# Patient Record
Sex: FEMALE | Race: OTHER | HISPANIC OR LATINO | Employment: STUDENT | URBAN - METROPOLITAN AREA
[De-identification: names, ages, dates, MRNs, and addresses within clinical notes are randomized per-mention and may not be internally consistent; named-entity substitution may affect disease eponyms.]

---

## 2017-09-13 ENCOUNTER — GENERIC CONVERSION - ENCOUNTER (OUTPATIENT)
Dept: OTHER | Facility: OTHER | Age: 22
End: 2017-09-13

## 2017-09-13 DIAGNOSIS — M25.561 PAIN IN RIGHT KNEE: ICD-10-CM

## 2017-09-13 DIAGNOSIS — M76.51 PATELLAR TENDINITIS OF RIGHT KNEE: ICD-10-CM

## 2017-09-18 ENCOUNTER — GENERIC CONVERSION - ENCOUNTER (OUTPATIENT)
Dept: OTHER | Facility: OTHER | Age: 22
End: 2017-09-18

## 2017-09-18 ENCOUNTER — APPOINTMENT (OUTPATIENT)
Dept: PHYSICAL THERAPY | Age: 22
End: 2017-09-18
Payer: COMMERCIAL

## 2017-09-18 DIAGNOSIS — M25.561 PAIN IN RIGHT KNEE: ICD-10-CM

## 2017-09-18 DIAGNOSIS — M76.51 PATELLAR TENDINITIS OF RIGHT KNEE: ICD-10-CM

## 2017-09-18 PROCEDURE — G8978 MOBILITY CURRENT STATUS: HCPCS

## 2017-09-18 PROCEDURE — 97162 PT EVAL MOD COMPLEX 30 MIN: CPT

## 2017-09-18 PROCEDURE — 97035 APP MDLTY 1+ULTRASOUND EA 15: CPT

## 2017-09-18 PROCEDURE — G8979 MOBILITY GOAL STATUS: HCPCS

## 2017-09-20 ENCOUNTER — GENERIC CONVERSION - ENCOUNTER (OUTPATIENT)
Dept: OTHER | Facility: OTHER | Age: 22
End: 2017-09-20

## 2017-09-28 ENCOUNTER — APPOINTMENT (OUTPATIENT)
Dept: PHYSICAL THERAPY | Age: 22
End: 2017-09-28
Payer: COMMERCIAL

## 2017-09-28 PROCEDURE — 97035 APP MDLTY 1+ULTRASOUND EA 15: CPT

## 2017-09-28 PROCEDURE — 97110 THERAPEUTIC EXERCISES: CPT

## 2017-10-05 ENCOUNTER — APPOINTMENT (OUTPATIENT)
Dept: PHYSICAL THERAPY | Age: 22
End: 2017-10-05
Payer: COMMERCIAL

## 2017-10-05 PROCEDURE — 97110 THERAPEUTIC EXERCISES: CPT

## 2017-10-10 ENCOUNTER — APPOINTMENT (OUTPATIENT)
Dept: PHYSICAL THERAPY | Age: 22
End: 2017-10-10
Payer: COMMERCIAL

## 2017-10-17 ENCOUNTER — GENERIC CONVERSION - ENCOUNTER (OUTPATIENT)
Dept: OTHER | Facility: OTHER | Age: 22
End: 2017-10-17

## 2017-10-17 ENCOUNTER — APPOINTMENT (OUTPATIENT)
Dept: PHYSICAL THERAPY | Age: 22
End: 2017-10-17
Payer: COMMERCIAL

## 2017-10-17 PROCEDURE — 97110 THERAPEUTIC EXERCISES: CPT

## 2017-10-17 PROCEDURE — G8979 MOBILITY GOAL STATUS: HCPCS

## 2017-10-17 PROCEDURE — G8980 MOBILITY D/C STATUS: HCPCS

## 2017-10-24 ENCOUNTER — APPOINTMENT (OUTPATIENT)
Dept: PHYSICAL THERAPY | Age: 22
End: 2017-10-24
Payer: COMMERCIAL

## 2017-12-13 ENCOUNTER — LAB REQUISITION (OUTPATIENT)
Dept: LAB | Facility: HOSPITAL | Age: 22
End: 2017-12-13
Payer: COMMERCIAL

## 2017-12-13 ENCOUNTER — ALLSCRIPTS OFFICE VISIT (OUTPATIENT)
Dept: OTHER | Facility: OTHER | Age: 22
End: 2017-12-13

## 2017-12-13 DIAGNOSIS — N76.0 ACUTE VAGINITIS: ICD-10-CM

## 2017-12-13 LAB
BACTERIA UR QL AUTO: NEGATIVE
CLUE CELL (HISTORICAL): NEGATIVE
HYPHAL YEAST (HISTORICAL): NEGATIVE
KOH PREP (HISTORICAL): NEGATIVE
PH UR STRIP.AUTO: 4.5 [PH]
TRICHOMONAS (HISTORICAL): NEGATIVE
YEAST (HISTORICAL): NEGATIVE

## 2017-12-13 PROCEDURE — 87510 GARDNER VAG DNA DIR PROBE: CPT | Performed by: PHYSICIAN ASSISTANT

## 2017-12-13 PROCEDURE — 87660 TRICHOMONAS VAGIN DIR PROBE: CPT | Performed by: PHYSICIAN ASSISTANT

## 2017-12-13 PROCEDURE — 87591 N.GONORRHOEAE DNA AMP PROB: CPT | Performed by: PHYSICIAN ASSISTANT

## 2017-12-13 PROCEDURE — 87480 CANDIDA DNA DIR PROBE: CPT | Performed by: PHYSICIAN ASSISTANT

## 2017-12-13 PROCEDURE — 87491 CHLMYD TRACH DNA AMP PROBE: CPT | Performed by: PHYSICIAN ASSISTANT

## 2017-12-15 LAB
CHLAMYDIA DNA CVX QL NAA+PROBE: NORMAL
N GONORRHOEA DNA GENITAL QL NAA+PROBE: NORMAL

## 2017-12-15 NOTE — PROGRESS NOTES
Assessment  1  Oral contraceptive prescribed (V25 01) (Z30 011)   2  Vaginal discharge (623 5) (N89 8)    Plan  Oral contraceptive prescribed    · Altavera 0 15-30 MG-MCG Oral Tablet; TAKE 1 TABLET DAILY   Rx By: Katarzyna Miranda; Dispense: 84 Days ; #:84 Tablet; Refill: 0;For: Oral contraceptive prescribed; RE = N; Sent To: [id number]  Oral contraceptive prescribed, Vaginal discharge    · 97 Marina Galeano; Status:Resulted - Requires Verification;   Done: [id number] 01:53PM   Performed: In Office; EWJ:[id number];UQQIICG; Today;For:Oral contraceptive prescribed, Vaginal discharge; Ordered By:Lila Trujillo;    Discussion/Summary  Discussion Summary:   Wet mount negative, affirm and GC/Chlam sent out, will call if abnomal3 months altavera OCP sent via [facility] 3 months for annual       Chief Complaint  Chief Complaint Free Text Note Form: Acute visitPt c/o having vaginal discharge yellow/green in color along with a foul odor, denies any itching and no burning  States she was recently taking Altavera birth control Rx by [facility] but ran out and would like to discuss restarting birth control again  History of Present Illness  HPI: 24 y/o female who presents to the office as a new patient c/o yellow vaginal d/c x 1-2 months  Pt describes discharge as neon yellow and thin  Pt also has noticed an acidic odor with the discharge  Pt denies any itching, burning, abd pain, fever, chills, dysuria, or recent antibiotics  Pt has not used any OTC products and denies any aggravating or alleviating factors  Pt also needs refills of OCP, [facility]  Pt on altavera  Review of Systems  Focused-Female:  Constitutional: no fever-- and-- no chills  Gastrointestinal: no abdominal pain  Genitourinary: vaginal discharge, but-- no dysuria,-- no pelvic pain-- and-- no unexplained vaginal bleeding  Active Problems  1  Abdominal pain (789 00) (R10 9)   2   Acute gastritis (535 00) (K29 00)   3  Complex Regional Pain Syndrome Type I Of The Wrist (337 21)   4  Hip pain, right (719 45) (M25 551)   5  Hip pain, right (719 45) (M25 551)   6  Malaise and fatigue (780 79) (R53 81,R53 83)   7  Malaise and fatigue (780 79) (R53 81,R53 83)   8  Nausea (787 02) (R11 0)   9  Pain in joint of left wrist (719 43) (M25 532)   10  Patellar tendinitis of right knee (726 64) (M76 51)   11  Post-concussion syndrome (310 2) (F07 81)   12  Right knee pain (719 46) (M25 561)   13  Vitamin D deficiency (268 9) (E55 9)    Past Medical History  1  History of Anxiety (300 00) (F41 9)   2  History of Breast tenderness in female (611 71) (N64 4)   3  History of Ganglion Of The Left Wrist (727 43)   4  H/O screening mammography (V15 89) (Z92 89)   5  History of abdominal pain (V13 89) (Z87 898)   6  History of back injury (V15 59) (Z87 828)   7  History of chest pain (V13 89) (Z87 898)   8  History of concussion (V15 52) (Z87 820)   9  History of fracture of fibula (V15 51) (Z87 81)   10  History of headache (V13 89) (Z87 898)   11  History of low back pain (V13 59) (Z87 39)   12  History of shortness of breath (V13 89) (Z87 898)   13  History of sprain of wrist (V13 59) (Z87 828)   14  History of viral infection (V12 09) (Z86 19)   15  History of vomiting (V13 89) (Z87 898)   16  History of Injury of toe on right foot (959 7) (K95 475B)   17  History of Menarche (V21 8)   18  History of Pes planus (734) (M21 40)  Active Problems And Past Medical History Reviewed: The active problems and past medical history were reviewed and updated today  Surgical History  1  History of Oral Surgery Tooth Extraction   2  History of Wrist Excision Of Ganglion  Surgical History Reviewed: The surgical history was reviewed and updated today  Family History  Mother    1  Family history of hypertension (V17 49) (Z82 49)   2  Family history of migraine headaches (V17 2) (Z82 0)   3   Family history of type 2 diabetes mellitus (V18 0) (Z83 3)   4  Family history of varicose veins (V17 49) (Z82 49)   5  Family history of Headache  Father    6  Family history of Cholelithiasis   7  Family history of hypertension (V17 49) (Z82 49)   8  Family history of sarcoidosis (V19 8) (Z83 2)  Sister    5  Family history of thyroid disease (V18 19) (Z83 49)  Family History    10  Family history of Cancer   11  Family history of Diabetes Mellitus (V18 0)   12  Family history of Hypertension (V17 49)  Family History Reviewed: The family history was reviewed and updated today  Social History   · Activities: Gymnastics   · Denied: History of Alcohol use   · Brushes teeth twice a day   · Dental care, regularly   · Denied: History of Drug use   · Lives with parents   · Never A Smoker   · Not currently sexually active   · Sexually active   · Sleeps 8 - 10 hours a day   · Tobacco smoke exposure (V15 89) (Z77 22)  Social History Reviewed: The social history was reviewed and updated today  Current Meds   1  Daily Multivitamin TABS; Therapy: (Recorded:22Jun2015) to Recorded   2  Pennsaid 2 % Transdermal Solution; Please apply 2 pumps to the affected area with phonophoresis; Therapy: [id number] to (Last Rx:[id number])  Requested for: 59Itk1387 Ordered   3  Probiotic Product TABS; Therapy: (Recorded:22Jun2015) to Recorded  Medication List Reviewed: The medication list was reviewed and updated today  Allergies  1  Iodides   2  Iodinated Contrast Media  3  No Known Environmental Allergies   4  No Known Food Allergies    Vitals  Vital Signs    Recorded: 25ILY4429 42:89ZJ   Systolic 722, LUE, Sitting   Diastolic 68, LUE, Sitting   Height 5 ft 5 5 in   Weight 116 lb 8 oz   BMI Calculated 19 09   BSA Calculated 1 58   LMP [id number]     Physical Exam   Constitutional  General appearance: No acute distress, well appearing and well nourished  Genitourinary  External genitalia: Normal and no lesions appreciated     Vagina: Abnormal  Vagina: moderate,-- not foul smelling,-- white-- and-- frothy vaginal discharge, but-- no bleeding  Urethra: Normal    Urethral meatus: Normal    Bladder: Normal, soft, non-tender and no prolapse or masses appreciated  Cervix: Normal, no palpable masses  Uterus: Normal, non-tender, not enlarged, and no palpable masses  Adnexa/parametria: Normal, non-tender and no fullness or masses appreciated  Abdomen  Abdomen: Normal, non-tender, and no organomegaly noted     Psychiatric  Orientation to person, place, and time: Normal    Mood and affect: Normal        Signatures   Electronically signed by : MICHELLE Matson; Dec 13 2017  1:58PM EST                       (Author)    Electronically signed by : DEVONTE Monet ; Dec 13 2017  2:33PM EST

## 2017-12-16 LAB
CANDIDA RRNA VAG QL PROBE: NEGATIVE
G VAGINALIS RRNA GENITAL QL PROBE: POSITIVE
T VAGINALIS RRNA GENITAL QL PROBE: NEGATIVE

## 2018-01-22 VITALS
DIASTOLIC BLOOD PRESSURE: 67 MMHG | SYSTOLIC BLOOD PRESSURE: 110 MMHG | WEIGHT: 109 LBS | BODY MASS INDEX: 20.06 KG/M2 | HEIGHT: 62 IN

## 2018-01-23 VITALS
BODY MASS INDEX: 20.64 KG/M2 | DIASTOLIC BLOOD PRESSURE: 68 MMHG | HEIGHT: 63 IN | SYSTOLIC BLOOD PRESSURE: 104 MMHG | WEIGHT: 116.5 LBS

## 2018-03-06 ENCOUNTER — HOSPITAL ENCOUNTER (EMERGENCY)
Facility: HOSPITAL | Age: 23
Discharge: HOME | End: 2018-03-06
Attending: EMERGENCY MEDICINE
Payer: COMMERCIAL

## 2018-03-06 VITALS
HEART RATE: 75 BPM | SYSTOLIC BLOOD PRESSURE: 95 MMHG | HEIGHT: 62 IN | BODY MASS INDEX: 20.8 KG/M2 | TEMPERATURE: 98.6 F | RESPIRATION RATE: 18 BRPM | WEIGHT: 113 LBS | DIASTOLIC BLOOD PRESSURE: 58 MMHG | OXYGEN SATURATION: 98 %

## 2018-03-06 DIAGNOSIS — R11.2 NAUSEA AND VOMITING: ICD-10-CM

## 2018-03-06 DIAGNOSIS — A08.4 VIRAL GASTROENTERITIS: Primary | ICD-10-CM

## 2018-03-06 DIAGNOSIS — R19.7 DIARRHEA OF PRESUMED INFECTIOUS ORIGIN: ICD-10-CM

## 2018-03-06 LAB
ALBUMIN SERPL BCP-MCNC: 3.9 G/DL (ref 3.5–5)
ALP SERPL-CCNC: 56 U/L (ref 46–116)
ALT SERPL W P-5'-P-CCNC: 17 U/L (ref 12–78)
ANION GAP SERPL CALCULATED.3IONS-SCNC: 10 MMOL/L (ref 4–13)
AST SERPL W P-5'-P-CCNC: 48 U/L (ref 5–45)
BASOPHILS # BLD MANUAL: 0.1 THOUSAND/UL (ref 0–0.1)
BASOPHILS NFR MAR MANUAL: 1 % (ref 0–1)
BILIRUB SERPL-MCNC: 2.32 MG/DL (ref 0.2–1)
BUN SERPL-MCNC: 14 MG/DL (ref 5–25)
CALCIUM SERPL-MCNC: 8.9 MG/DL (ref 8.3–10.1)
CHLORIDE SERPL-SCNC: 106 MMOL/L (ref 100–108)
CO2 SERPL-SCNC: 20 MMOL/L (ref 21–32)
CREAT SERPL-MCNC: 0.82 MG/DL (ref 0.6–1.3)
EOSINOPHIL # BLD MANUAL: 0 THOUSAND/UL (ref 0–0.4)
EOSINOPHIL NFR BLD MANUAL: 0 % (ref 0–6)
ERYTHROCYTE [DISTWIDTH] IN BLOOD BY AUTOMATED COUNT: 12.6 % (ref 11.6–15.1)
GFR SERPL CREATININE-BSD FRML MDRD: 103 ML/MIN/1.73SQ M
GLUCOSE SERPL-MCNC: 126 MG/DL (ref 65–140)
HCT VFR BLD AUTO: 42.2 % (ref 34.8–46.1)
HGB BLD-MCNC: 14.5 G/DL (ref 11.5–15.4)
LIPASE SERPL-CCNC: 49 U/L (ref 73–393)
LYMPHOCYTES # BLD AUTO: 0.61 THOUSAND/UL (ref 0.6–4.47)
LYMPHOCYTES # BLD AUTO: 6 % (ref 14–44)
MCH RBC QN AUTO: 29.5 PG (ref 26.8–34.3)
MCHC RBC AUTO-ENTMCNC: 34.4 G/DL (ref 31.4–37.4)
MCV RBC AUTO: 86 FL (ref 82–98)
METAMYELOCYTES NFR BLD MANUAL: 1 % (ref 0–1)
MONOCYTES # BLD AUTO: 0.2 THOUSAND/UL (ref 0–1.22)
MONOCYTES NFR BLD: 2 % (ref 4–12)
NEUTROPHILS # BLD MANUAL: 9.2 THOUSAND/UL (ref 1.85–7.62)
NEUTS BAND NFR BLD MANUAL: 6 % (ref 0–8)
NEUTS SEG NFR BLD AUTO: 84 % (ref 43–75)
NRBC BLD AUTO-RTO: 0 /100 WBCS
PLATELET # BLD AUTO: 185 THOUSANDS/UL (ref 149–390)
PLATELET BLD QL SMEAR: ADEQUATE
PMV BLD AUTO: 11.9 FL (ref 8.9–12.7)
POTASSIUM SERPL-SCNC: 4.6 MMOL/L (ref 3.5–5.3)
PROT SERPL-MCNC: 8.3 G/DL (ref 6.4–8.2)
RBC # BLD AUTO: 4.92 MILLION/UL (ref 3.81–5.12)
RBC MORPH BLD: NORMAL
SODIUM SERPL-SCNC: 136 MMOL/L (ref 136–145)
WBC # BLD AUTO: 10.22 THOUSAND/UL (ref 4.31–10.16)

## 2018-03-06 PROCEDURE — 83690 ASSAY OF LIPASE: CPT | Performed by: EMERGENCY MEDICINE

## 2018-03-06 PROCEDURE — 96374 THER/PROPH/DIAG INJ IV PUSH: CPT

## 2018-03-06 PROCEDURE — 36415 COLL VENOUS BLD VENIPUNCTURE: CPT | Performed by: EMERGENCY MEDICINE

## 2018-03-06 PROCEDURE — 80053 COMPREHEN METABOLIC PANEL: CPT | Performed by: EMERGENCY MEDICINE

## 2018-03-06 PROCEDURE — 99284 EMERGENCY DEPT VISIT MOD MDM: CPT

## 2018-03-06 PROCEDURE — 96375 TX/PRO/DX INJ NEW DRUG ADDON: CPT

## 2018-03-06 PROCEDURE — 96361 HYDRATE IV INFUSION ADD-ON: CPT

## 2018-03-06 PROCEDURE — 85007 BL SMEAR W/DIFF WBC COUNT: CPT | Performed by: EMERGENCY MEDICINE

## 2018-03-06 PROCEDURE — 85027 COMPLETE CBC AUTOMATED: CPT | Performed by: EMERGENCY MEDICINE

## 2018-03-06 RX ORDER — DICYCLOMINE HCL 20 MG
20 TABLET ORAL 2 TIMES DAILY
Qty: 20 TABLET | Refills: 0 | Status: SHIPPED | OUTPATIENT
Start: 2018-03-06 | End: 2018-03-28

## 2018-03-06 RX ORDER — DICYCLOMINE HCL 20 MG
20 TABLET ORAL ONCE
Status: COMPLETED | OUTPATIENT
Start: 2018-03-06 | End: 2018-03-06

## 2018-03-06 RX ORDER — ONDANSETRON 2 MG/ML
4 INJECTION INTRAMUSCULAR; INTRAVENOUS ONCE
Status: COMPLETED | OUTPATIENT
Start: 2018-03-06 | End: 2018-03-06

## 2018-03-06 RX ORDER — BUPROPION HYDROCHLORIDE 75 MG/1
75 TABLET ORAL DAILY
COMMUNITY
End: 2018-09-14

## 2018-03-06 RX ORDER — KETOROLAC TROMETHAMINE 30 MG/ML
15 INJECTION, SOLUTION INTRAMUSCULAR; INTRAVENOUS ONCE
Status: COMPLETED | OUTPATIENT
Start: 2018-03-06 | End: 2018-03-06

## 2018-03-06 RX ORDER — LEVONORGESTREL AND ETHINYL ESTRADIOL 0.15-0.03
1 KIT ORAL DAILY
COMMUNITY
Start: 2017-12-13 | End: 2018-03-28 | Stop reason: SDUPTHER

## 2018-03-06 RX ORDER — ONDANSETRON 4 MG/1
4 TABLET, FILM COATED ORAL EVERY 8 HOURS PRN
Qty: 24 TABLET | Refills: 0 | Status: SHIPPED | OUTPATIENT
Start: 2018-03-06 | End: 2018-03-28

## 2018-03-06 RX ADMIN — ONDANSETRON 4 MG: 2 INJECTION INTRAMUSCULAR; INTRAVENOUS at 12:33

## 2018-03-06 RX ADMIN — SODIUM CHLORIDE 1000 ML: 0.9 INJECTION, SOLUTION INTRAVENOUS at 12:32

## 2018-03-06 RX ADMIN — SODIUM CHLORIDE 1000 ML: 0.9 INJECTION, SOLUTION INTRAVENOUS at 14:28

## 2018-03-06 RX ADMIN — KETOROLAC TROMETHAMINE 15 MG: 30 INJECTION, SOLUTION INTRAMUSCULAR at 14:05

## 2018-03-06 RX ADMIN — DICYCLOMINE HYDROCHLORIDE 20 MG: 20 TABLET ORAL at 12:55

## 2018-03-06 NOTE — ED PROVIDER NOTES
History  Chief Complaint   Patient presents with    Abdominal Pain     Vomiting at least 1x and hour since 1900 and it has increased since 0300  C/O BEING DIZZY AND WEAK  aLSO HAVE MULTIPLE BOUTS OF DIARRHEA     26-year-old female with no significant past medical history who is presenting with a 1 day history of nausea, vomiting, and diarrhea  Patient states that she began to vomit at approximately 1700 on the evening prior to presentation  Patient initially vomited 3 times per hour initially but has since vomited once per hour  Her last episode of vomiting was at 1015 on the morning of presentation  Vomit is nonbilious, nonbloody  Patient also reports frequent diarrhea, with watery bowel movements approximately every hour  No hematochezia or melena  Patient reports associated weakness, lightheadedness, and dizziness  She also reports diffuse cramping abdominal pain  Patient reports that her menses are irregular; last menstrual period was at the end of the history  Patient is sexually active but regularly uses contraception and is not concerned for pregnancy at this time  Patient does not have a history of abdominal surgery  Patient reports that she recently went on a cruise and returned on February 12  No fevers at home  Review of systems is otherwise negative  Plan:  Likely viral gastroenteritis  We will administer IV fluids, IV antiemetics, and Bentyl for symptom relief  We will obtain CBC, CMP, and lipase  We will reassess patient frequently  CT of the abdomen and pelvis if symptoms do not improve  Prior to Admission Medications   Prescriptions Last Dose Informant Patient Reported? Taking?    buPROPion [facility]) 75 mg tablet 3/5/2018 at Unknown time  Yes Yes   Sig: Take 75 mg by mouth daily   levonorgestrel-ethinyl estradiol (NORDETTE) 0 15-30 MG-MCG per tablet 3/5/2018 at Unknown time  Yes Yes   Sig: Take 1 tablet by mouth daily      Facility-Administered Medications: None History reviewed  No pertinent past medical history  History reviewed  No pertinent surgical history  History reviewed  No pertinent family history  I have reviewed and agree with the history as documented  Social History   Substance Use Topics    Smoking status: Never Smoker    Smokeless tobacco: Not on file    Alcohol use No        Review of Systems   Constitutional: Negative for diaphoresis, fever and unexpected weight change  HENT: Negative for congestion, rhinorrhea and sore throat  Eyes: Negative for pain, discharge and visual disturbance  Respiratory: Negative for cough, shortness of breath and wheezing  Cardiovascular: Negative for chest pain, palpitations and leg swelling  Gastrointestinal: Positive for abdominal pain, nausea and vomiting  Negative for blood in stool, constipation and diarrhea  Genitourinary: Negative for dysuria, flank pain and hematuria  Musculoskeletal: Negative for arthralgias and joint swelling  Skin: Negative for rash and wound  Allergic/Immunologic: Negative for environmental allergies and food allergies  Neurological: Negative for dizziness, seizures, weakness and numbness  Hematological: Negative for adenopathy  Psychiatric/Behavioral: Negative for confusion and hallucinations         Physical Exam  ED Triage Vitals   Temperature Pulse Respirations Blood Pressure SpO2   03/06/18 1042 03/06/18 1042 03/06/18 1042 03/06/18 1042 03/06/18 1042   98 6 °F (37 °C) (!) 118 22 122/65 96 %      Temp Source Heart Rate Source Patient Position - Orthostatic VS BP Location FiO2 (%)   03/06/18 1042 03/06/18 1042 03/06/18 1042 03/06/18 1257 --   Tympanic Monitor Sitting Left arm       Pain Score       03/06/18 1042       6           Orthostatic Vital Signs  Vitals:    03/06/18 1326 03/06/18 1400 03/06/18 1445 03/06/18 1452   BP: 99/71 99/61  95/58   Pulse: 99 90 78 75   Patient Position - Orthostatic VS: Lying   Lying       Physical Exam   Constitutional: She is oriented to person, place, and time  She appears well-developed and well-nourished  No distress  HENT:   Head: Normocephalic and atraumatic  Right Ear: External ear normal    Left Ear: External ear normal    Dry mucous membranes  Eyes: Conjunctivae and EOM are normal  Pupils are equal, round, and reactive to light  Cardiovascular: Normal rate, regular rhythm and normal heart sounds  Pulmonary/Chest: Effort normal and breath sounds normal  No respiratory distress  Abdominal: Soft  Bowel sounds are normal  She exhibits no distension  There is tenderness  There is no guarding  Abdomen is not distended  Bowel sounds are normally active  There is tenderness to palpation diffusely, worst in the periumbilical region  No guarding or peritoneal signs  Musculoskeletal: Normal range of motion  She exhibits no deformity  Neurological: She is alert and oriented to person, place, and time  No gross motor deficits noted  Cranial nerves II-XII are intact  Speech is fluent without dysarthria or aphasia  Skin: Skin is warm and dry  Capillary refill takes less than 2 seconds  Psychiatric: She has a normal mood and affect  Her behavior is normal  Thought content normal    Nursing note and vitals reviewed        ED Medications  Medications   sodium chloride 0 9 % bolus 1,000 mL (0 mL Intravenous Stopped 3/6/18 1332)   ondansetron (ZOFRAN) injection 4 mg (4 mg Intravenous Given 3/6/18 1233)   dicyclomine (BENTYL) tablet 20 mg (20 mg Oral Given 3/6/18 1255)   ketorolac (TORADOL) injection 15 mg (15 mg Intravenous Given 3/6/18 1405)   sodium chloride 0 9 % bolus 1,000 mL (0 mL Intravenous Stopped 3/6/18 1450)       Diagnostic Studies  Results Reviewed     Procedure Component Value Units Date/Time    CBC and differential [[id number]]  (Abnormal) Collected:  03/06/18 1224    Lab Status:  Final result Specimen:  Blood from Arm, Left Updated:  03/06/18 1329     WBC 10 22 (H) Thousand/uL      RBC 4 92 Million/uL Hemoglobin 14 5 g/dL      Hematocrit 42 2 %      MCV 86 fL      MCH 29 5 pg      MCHC 34 4 g/dL      RDW 12 6 %      MPV 11 9 fL      Platelets 270 Thousands/uL      nRBC 0 /100 WBCs     Narrative: This is an appended report  These results have been appended to a previously verified report  Comprehensive metabolic panel [[id number]]  (Abnormal) Collected:  03/06/18 1224    Lab Status:  Final result Specimen:  Blood from Arm, Left Updated:  03/06/18 1322     Sodium 136 mmol/L      Potassium 4 6 mmol/L      Chloride 106 mmol/L      CO2 20 (L) mmol/L      Anion Gap 10 mmol/L      BUN 14 mg/dL      Creatinine 0 82 mg/dL      Glucose 126 mg/dL      Calcium 8 9 mg/dL      AST 48 (H) U/L      ALT 17 U/L      Alkaline Phosphatase 56 U/L      Total Protein 8 3 (H) g/dL      Albumin 3 9 g/dL      Total Bilirubin 2 32 (H) mg/dL      eGFR 103 ml/min/1 73sq m     Narrative:         National Kidney Disease Education Program recommendations are as follows:  GFR calculation is accurate only with a steady state creatinine  Chronic Kidney disease less than 60 ml/min/1 73 sq  meters  Kidney failure less than 15 ml/min/1 73 sq  meters  Lipase [[id number]]  (Abnormal) Collected:  03/06/18 1224    Lab Status:  Final result Specimen:  Blood from Arm, Left Updated:  03/06/18 1322     Lipase 49 (L) u/L                  No orders to display         Procedures  Procedures      Phone Consults  ED Phone Contact    ED Course  ED Course as of Mar 06 1949   Tue Mar 06, 2018   1153 Blood Pressure: 122/65   1153 Temperature: 98 6 °F (37 °C)   1153 Pulse: (!) 118   1153 Respirations: 22   1153 SpO2: 96 %   1301 CBC unremarkable  1322 Lipase: (!) 49   1323 CMP with slightly low bicarbonate, slightly elevated AST, and elevated bilirubin  1333 Pulse: 99   1335 Patient reassessed  Her pulse has significantly improved  It is now in the mid [de-identified]  Patient reports continued diffuse cramping abdominal pain    We will PO challenge the patient at this time  1417 Patient tolerated popsicle without difficulty  She requested water to drink  1432 Patient feels well  She has been tolerating water without difficulty  No further episodes of vomiting  Her pain is controlled  Patient states that she is ready to go home  We will discharge the patient with prescriptions and return precautions  MDM  Number of Diagnoses or Management Options  Diarrhea of presumed infectious origin: new and requires workup  Nausea and vomiting: new and requires workup  Viral gastroenteritis: new and requires workup  Diagnosis management comments:     26year old female presenting with nausea, vomiting, diffuse cramping abdominal pain, and diarrhea  Vital signs were significant for tachycardia  Physical examination revealed dry mucous membranes and diffuse abdominal tenderness with no peritonitis  We were primarily concerned for viral gastroenteritis  Parasitic infection highly unlikely with lack of exposure to possible sources of parasites  With relatively benign abdominal examination, we had low concern for inflammatory pathology such as appendicitis or diverticulitis  Gynecology pathology would not explain symptoms  Labs obtained and reviewed above, all normal  Treated patient's symptoms with IV fluids, IV Zofran, Bentyl, and IV Toradol  Patient improved significantly  Her tachycardia resolved  She tolerated PO without difficulty  Patient was discharged with prescriptions for Bentyl and Zofran  Return precautions provided  Portions of the chart may have been created with voice recognition software  Occasional wrong word or "sound a like" substitutions may have occurred due to the inherent limitations of voice recognition software  Please read the chart carefully and recognize, using context, where substitutions have occurred         Amount and/or Complexity of Data Reviewed  Clinical lab tests: ordered and reviewed  Decide to obtain previous medical records or to obtain history from someone other than the patient: yes  Review and summarize past medical records: yes    Risk of Complications, Morbidity, and/or Mortality  Presenting problems: low  Diagnostic procedures: minimal  Management options: minimal    Patient Progress  Patient progress: improved    CritCare Time    Disposition  Final diagnoses:   Viral gastroenteritis   Nausea and vomiting   Diarrhea of presumed infectious origin     Time reflects when diagnosis was documented in both MDM as applicable and the Disposition within this note     Time User Action Codes Description Comment    3/6/2018  2:36 PM Janette Mad Add [A08 4] Viral gastroenteritis     3/6/2018  2:36 PM Janette Mad Add [R11 2] Nausea and vomiting     3/6/2018  2:36 PM Janette Mad Add [A09] Diarrhea of presumed infectious origin       ED Disposition     ED Disposition Condition Comment    Discharge  [facility] discharge to home/self care  Condition at discharge: Good        Follow-up Information     Follow up With Specialties Details Why Contact Info Additional Information    Noemy Herrmann MD Internal Medicine Call As needed  [facility] Emergency Department Emergency Medicine Go to If symptoms worsen: fever above 104, vomiting blood, severe pain in the right lower abdomen, blood in diarrhea   [address]  [phone]  ED, [address] [place] [address], [phone]        Discharge Medication List as of 3/6/2018  2:39 PM      START taking these medications    Details   dicyclomine (BENTYL) 20 mg tablet Take 1 tablet (20 mg total) by mouth 2 (two) times a day, Starting Tue 3/6/2018, Print      ondansetron (ZOFRAN) 4 mg tablet Take 1 tablet (4 mg total) by mouth every 8 (eight) hours as needed for nausea or vomiting, Starting Tue 3/6/2018, Print         CONTINUE these medications which have NOT CHANGED    Details   buPROPion (WELLBUTRIN) 75 mg tablet Take 75 mg by mouth daily, Historical Med      levonorgestrel-ethinyl estradiol (NORDETTE) 0 15-30 MG-MCG per tablet Take 1 tablet by mouth daily, Starting Wed 12/13/2017, Historical Med           No discharge procedures on file  ED Provider  Attending physically available and evaluated [facility]  I managed the patient along with the ED Attending      Electronically Signed by         Rebecca Mcdonald MD  03/06/18 9726

## 2018-03-06 NOTE — DISCHARGE INSTRUCTIONS
Gastroenteritis   WHAT YOU NEED TO KNOW:   Gastroenteritis, or stomach flu, is an infection of the stomach and intestines  DISCHARGE INSTRUCTIONS:   Call 911 for any of the following:   · You have trouble breathing or a very fast pulse  Return to the emergency department if:   · You see blood in your diarrhea  · You cannot stop vomiting  · You have not urinated for 12 hours  · You feel like you are going to faint  Contact your healthcare provider if:   · You have a fever  · You continue to vomit or have diarrhea, even after treatment  · You see worms in your diarrhea  · Your mouth or eyes are dry  You are not urinating as much or as often  · You have questions or concerns about your condition or care  Medicines:   · Medicines  may be given to stop vomiting or diarrhea, decrease abdominal cramps, or treat an infection  · Take your medicine as directed  Contact your healthcare provider if you think your medicine is not helping or if you have side effects  Tell him or her if you are allergic to any medicine  Keep a list of the medicines, vitamins, and herbs you take  Include the amounts, and when and why you take them  Bring the list or the pill bottles to follow-up visits  Carry your medicine list with you in case of an emergency  Manage your symptoms:   · Drink liquids as directed  Ask your healthcare provider how much liquid to drink each day, and which liquids are best for you  You may also need to drink an oral rehydration solution (ORS)  An ORS has the right amounts of sugar, salt, and minerals in water to replace body fluids  · Eat bland foods  When you feel hungry, begin eating soft, bland foods  Examples are bananas, clear soup, potatoes, and applesauce  Do not have dairy products, alcohol, sugary drinks, or drinks with caffeine until you feel better  · Rest as much as possible  Slowly start to do more each day when you begin to feel better    Prevent the spread of gastroenteritis:  Gastroenteritis can spread easily  Keep yourself, your family, and your surroundings clean to help prevent the spread of gastroenteritis:  · Wash your hands often  Use soap and water  Wash your hands after you use the bathroom, change a child's diapers, or sneeze  Wash your hands before you prepare or eat food  · Clean surfaces and do laundry often  Wash your clothes and towels separately from the rest of the laundry  Clean surfaces in your home with antibacterial  or bleach  · Clean food thoroughly and cook safely  Wash raw vegetables before you cook  Cook meat, fish, and eggs fully  Do not use the same dishes for raw meat as you do for other foods  Refrigerate any leftover food immediately  · Be aware when you camp or travel  Drink only clean water  Do not drink from rivers or lakes unless you purify or boil the water first  When you travel, drink bottled water and do not add ice  Do not eat fruit that has not been peeled  Do not eat raw fish or meat that is not fully cooked  Follow up with your healthcare provider as directed:  Write down your questions so you remember to ask them during your visits  © 2017 [employer] Information is for End User's use only and may not be sold, redistributed or otherwise used for commercial purposes  All illustrations and images included in CareNotes® are the copyrighted property of [employer]  or [employer] Doming[employer]  The above information is an  only  It is not intended as medical advice for individual conditions or treatments  Talk to your doctor, nurse or pharmacist before following any medical regimen to see if it is safe and effective for you

## 2018-03-06 NOTE — ED ATTENDING ATTESTATION
John Paul Peralta DO, saw and evaluated the patient  I have discussed the patient with the resident/non-physician practitioner and agree with the resident's/non-physician practitioner's findings, Plan of Care, and MDM as documented in the resident's/non-physician practitioner's note, except where noted  All available labs and Radiology studies were reviewed  At this point I agree with the current assessment done in the Emergency Department  I have conducted an independent evaluation of this patient including a focused history and a physical exam     ED Note - Karuna Belle 26 y o  female MRN: [id number]  Unit/Bed#: [facility] 2 Encounter: [id number]    History of Present Illness   HPI  Karuna Belle is a 26 y o  female who presents for evaluation of n/v/d associated with diffuse abdominal cramping  which onset last night at approximately 1900 hours  Returned from cruise at end of February  Boyfriend was sick recently with similar symptoms  Patient denies abnormal vaginal discharge  No flank pain or urinary symptoms  No fever or chills  REVIEW OF SYSTEMS  See HPI for further details  12 systems reviewed and otherwise negative except as noted  Historical Information     PAST MEDICAL HISTORY  History reviewed  No pertinent past medical history  FAMILY HISTORY  History reviewed  No pertinent family history  SOCIAL HISTORY  Social History     Social History    Marital status: Single     Spouse name: N/A    Number of children: N/A    Years of education: N/A     Social History Main Topics    Smoking status: Never Smoker    Smokeless tobacco: None    Alcohol use No    Drug use: No    Sexual activity: Not Asked     Other Topics Concern    None     Social History Narrative    None       SURGICAL HISTORY  History reviewed  No pertinent surgical history    Meds/Allergies     CURRENT MEDICATIONS    Current Facility-Administered Medications:     dicyclomine (BENTYL) tablet 20 mg, 20 mg, Oral, Once, Lux Guajardo MD    sodium chloride 0 9 % bolus 1,000 mL, 1,000 mL, Intravenous, Once, Lux Guajardo MD, Last Rate: 1,000 mL/hr at 03/06/18 1232, 1,000 mL at 03/06/18 1232  No current outpatient prescriptions on file  (Not in a hospital admission)    ALLERGIES  No Known Allergies  Objective     PHYSICAL EXAM    VITAL SIGNS: Blood pressure 122/65, pulse (!) 118, temperature 98 6 °F (37 °C), temperature source Tympanic, resp  rate 22, height 5' 2" (1 575 m), weight 51 3 kg (113 lb), last menstrual period 02/24/2018, SpO2 96 %  Constitutional:  Appears well developed and well nourished, no acute distress, ill appearance   Eyes:  PERRL, EOMI, conjunctivae pink, sclerae non-icteric   HENT:  Normocephalic/Atraumatic, no rhinorrhea, mucous membranes dry   Neck: normal range of motion, no tenderness, supple   Respiratory:  No respiratory distress, normal breath sounds  Cardiovascular:  Normal rate, normal rhythm, no murmurs, no gallops, no rubs, peripheral pulses intact, no carotid bruits, no JVD  GI:  Soft, diffusely tender, non-distended, hyperactive bowel sounds, no organomegaly, no mass, no rebound, no guarding   :  No CVAT, no flank ecchymosis   Musculoskeletal:  No swelling or edema, no tenderness, no deformities  Integument:  Pink, warm, dry, Well hydrated, no rash, no erythema, no bullae   Lymphatic:  No cervical/ tonsillar/ submandibular lymphadenopathy noted   Neurologic:  Awake, Alert & oriented x 3, CN 2-12 intact, no focal neurological deficits  Psychiatric:  Speech and behavior appropriate       ED COURSE and MDM:    Assessment/Plan   Assessment:  Ashley Cottrell is a 26 y o  female presents for evaluation of n/v/d with crampy abdominal pain  Plan:  Labs, CT a/p prn, IV fluids, symptom management, disposition as appropriate  Portions of the record may have been created with voice recognition software   Occasional wrong word or "sound a like" substitutions may have occurred due to the inherent limitations of voice recognition software       ED Provider  Electronically Signed by

## 2018-03-19 ENCOUNTER — TELEPHONE (OUTPATIENT)
Dept: OBGYN CLINIC | Facility: CLINIC | Age: 23
End: 2018-03-19

## 2018-03-21 DIAGNOSIS — Z30.09 BIRTH CONTROL COUNSELING: Primary | ICD-10-CM

## 2018-03-21 RX ORDER — LEVONORGESTREL AND ETHINYL ESTRADIOL 0.15-0.03
1 KIT ORAL DAILY
Qty: 28 TABLET | Refills: 0 | Status: SHIPPED | OUTPATIENT
Start: 2018-03-21 | End: 2018-03-28

## 2018-03-21 NOTE — TELEPHONE ENCOUNTER
We rs due to weather - pt is OUT of her [facility] - needs sent RX sent today to her pharmacy listed

## 2018-03-28 ENCOUNTER — ANNUAL EXAM (OUTPATIENT)
Dept: OBGYN CLINIC | Facility: CLINIC | Age: 23
End: 2018-03-28
Payer: COMMERCIAL

## 2018-03-28 VITALS
HEIGHT: 63 IN | WEIGHT: 114 LBS | DIASTOLIC BLOOD PRESSURE: 62 MMHG | SYSTOLIC BLOOD PRESSURE: 110 MMHG | BODY MASS INDEX: 20.2 KG/M2

## 2018-03-28 DIAGNOSIS — Z30.41 ORAL CONTRACEPTIVE PILL SURVEILLANCE: ICD-10-CM

## 2018-03-28 DIAGNOSIS — Z01.419 ENCOUNTER FOR GYNECOLOGICAL EXAMINATION (GENERAL) (ROUTINE) WITHOUT ABNORMAL FINDINGS: Primary | ICD-10-CM

## 2018-03-28 PROCEDURE — G0145 SCR C/V CYTO,THINLAYER,RESCR: HCPCS | Performed by: PHYSICIAN ASSISTANT

## 2018-03-28 PROCEDURE — S0612 ANNUAL GYNECOLOGICAL EXAMINA: HCPCS | Performed by: PHYSICIAN ASSISTANT

## 2018-03-28 RX ORDER — LEVONORGESTREL AND ETHINYL ESTRADIOL 0.15-0.03
1 KIT ORAL DAILY
Qty: 84 TABLET | Refills: 3 | Status: SHIPPED | OUTPATIENT
Start: 2018-03-28 | End: 2018-12-13 | Stop reason: ALTCHOICE

## 2018-04-03 LAB
LAB AP GYN PRIMARY INTERPRETATION: NORMAL
LAB AP LMP: NORMAL
Lab: NORMAL

## 2018-06-06 ENCOUNTER — TRANSCRIBE ORDERS (OUTPATIENT)
Dept: ADMINISTRATIVE | Facility: HOSPITAL | Age: 23
End: 2018-06-06

## 2018-06-06 DIAGNOSIS — M79.671 PAIN IN RIGHT FOOT: ICD-10-CM

## 2018-06-06 DIAGNOSIS — M79.603 PAIN OF UPPER EXTREMITY, UNSPECIFIED LATERALITY: Primary | ICD-10-CM

## 2018-06-08 ENCOUNTER — HOSPITAL ENCOUNTER (OUTPATIENT)
Dept: RADIOLOGY | Facility: HOSPITAL | Age: 23
Discharge: HOME | End: 2018-06-08
Attending: INTERNAL MEDICINE | Admitting: RADIOLOGY
Payer: COMMERCIAL

## 2018-06-08 DIAGNOSIS — M79.671 PAIN IN RIGHT FOOT: ICD-10-CM

## 2018-06-08 PROCEDURE — 76882 US LMTD JT/FCL EVL NVASC XTR: CPT

## 2018-06-12 ENCOUNTER — EVALUATION (OUTPATIENT)
Dept: PHYSICAL THERAPY | Age: 23
End: 2018-06-12
Payer: COMMERCIAL

## 2018-06-12 DIAGNOSIS — M67.971 ACHILLES TENDON DISORDER, RIGHT: Primary | ICD-10-CM

## 2018-06-12 PROCEDURE — G8990 OTHER PT/OT CURRENT STATUS: HCPCS

## 2018-06-12 PROCEDURE — G8991 OTHER PT/OT GOAL STATUS: HCPCS

## 2018-06-12 PROCEDURE — 97162 PT EVAL MOD COMPLEX 30 MIN: CPT

## 2018-06-12 RX ORDER — NAPROXEN 500 MG/1
250 TABLET ORAL 2 TIMES DAILY WITH MEALS
COMMUNITY
End: 2018-09-14

## 2018-06-12 NOTE — PROGRESS NOTES
PT Evaluation     Today's date: 2018  Patient name: Quynh Stockton  : 1995  MRN: [id number]  Referring provider: Felicia Young MD  Dx:   Encounter Diagnosis     ICD-10-CM    1  Achilles tendon disorder, right M67 971        Start Time: 1100  Stop Time: 1130  Total time in clinic (min): 30 minutes    Assessment  Impairments: abnormal gait, abnormal or restricted ROM, activity intolerance, impaired physical strength, lacks appropriate home exercise program, pain with function and weight-bearing intolerance    Assessment details: Patient is a 25 y/o female who presents with signs and symptoms consistent with acute myofascial dysfunction of the distal achilles tendon due to a compensatory gait pattern  Patient demonstrates functional mobility deficits secondary to increased pain, decreased AROM/PROM and diminished strength/endurance levels  Patient is a good rehabilitation candidate and will benefit from skilled PT intervention to address the above issues and help return the patient to their prior and/or modified level of function  Understanding of Dx/Px/POC: good   Prognosis: good    Goals  Short Term Goal    1  Patient will report a 50% reduction in their subjective pain report (VAS) by 4 weeks  2   Patient will demonstrate a 50% improvement in AROM/PROM by 4 weeks  3   Patient will demonstrate (at least) a 1/2 grade strength improvement after 4 weeks  4   Ambulation/Balance/Stairclimbing will be improved by at least 50% after 4 weeks  5   Patient will improve FOTO score by at least 10 points by 4 weeks      Long Term Goal    1  Patient will demonstrate IADL at the prior/maximal level of function  2   Patient will demonstrate recreational performance at the prior and/or maximal level  3   Patient will demonstrate independence with their HEP        Plan  Patient would benefit from: skilled physical therapy  Planned modality interventions: ultrasound and cryotherapy  Planned therapy interventions: manual therapy, joint mobilization, neuromuscular re-education, functional ROM exercises, flexibility, therapeutic activities, stretching, strengthening, therapeutic exercise, home exercise program and patient education  Frequency: 2x week  Duration in weeks: 4  Treatment plan discussed with: patient        Subjective Evaluation    History of Present Illness  Mechanism of injury: Patient is a 27 y/o female who presents with acute R posterior ankle pain incurred simply while walking through a grocery store approximately one month ago  She reports that she had sustained a R knee injury last year and had developed a compensatory knee flexed toe walking gait pattern which likely led to her R achilles tendon dysfunction  Patient reports that she underwent a diagnostic ultrasound to rule out a tenon disruption  Patient would like to return to her prior level of fitness including cross training with a   Not a recurrent problem   Quality of life: good    Pain  Current pain ratin  At best pain rating: 3  At worst pain ratin  Location: R heel, distal achilles tendon  Quality: sharp and knife-like  Relieving factors: change in position and rest  Aggravating factors: standing, walking, running and stair climbing  Progression: no change    Patient Goals  Patient goals for therapy: decreased pain and return to sport/leisure activities          Objective     Tenderness     Right Ankle/Foot   Tenderness in the Achilles insertion and medial calcaneus  No tenderness in the proximal Achilles       Additional Tenderness Details  + lateral calcaneus pain with palpation as well    Active Range of Motion     Right Hip   Normal active range of motion    Right Knee   Normal active range of motion  Left Ankle/Foot   Dorsiflexion (ke): WFL  Dorsiflexion (kf): WFL  Plantar flexion: WFL  Inversion: WFL  Eversion: WFL    Right Ankle/Foot   Dorsiflexion (ke): 10 degrees with pain  Dorsiflexion (kf): 10 degrees with pain  Plantar flexion: WFL and with pain  Inversion: WFL and with pain  Eversion: 10 degrees with pain    Additional Active Range of Motion Details  AROM limited by 8/10 VAS pain R ankle    Strength/Myotome Testing     Right Hip   Planes of Motion   Flexion: 4+  Extension: 4+  Abduction: 4-  Adduction: 4+  External rotation: 4-  Internal rotation: 4    Isolated Muscles   Gluteus medius: 4-    Right Knee   Flexion: 4+  Extension: 5    Right Ankle/Foot   Dorsiflexion: 3+  Plantar flexion: 3+  Inversion: 3+  Eversion: 3+    Additional Strength Details  8/10 VAS pain limited all MMT  Immediate muscular inhibition with mild manual resistance    Ambulation     Observational Gait   Gait: antalgic   Walking speed within functional limits  Decreased stride length  Right foot contact pattern: foot flat    Quality of Movement During Gait     Knee    Knee (Right): Positive stiff knee         Flowsheet Rows      Most Recent Value   PT/OT G-Codes   Current Score  47   Projected Score  78   FOTO information reviewed  Yes   Assessment Type  Evaluation   G code set  Other PT/OT Primary   Other PT Primary Current Status ()  CJ   Other PT Primary Goal Status ()  CJ          Precautions: none    Daily Treatment Diary     Manual              IASTM                                                                     Exercise Diary              R bike             Eccentric heel drops on stair             Eccentric MET R ankle             2 way (KF/KE) step str             2 way prostretch             HS strap str                          TB isometric clamshell             TB iso reverse clamshell             TB iso S/L hip ABD             TB iso fire hydrant                                                                                                                                      Modalities              CP PRN             Pulsed US, 50% 0 8 w/cm^2

## 2018-06-18 ENCOUNTER — OFFICE VISIT (OUTPATIENT)
Dept: PHYSICAL THERAPY | Age: 23
End: 2018-06-18
Payer: COMMERCIAL

## 2018-06-18 DIAGNOSIS — M67.971 ACHILLES TENDON DISORDER, RIGHT: Primary | ICD-10-CM

## 2018-06-18 PROCEDURE — 97140 MANUAL THERAPY 1/> REGIONS: CPT | Performed by: PHYSICAL THERAPIST

## 2018-06-18 PROCEDURE — 97014 ELECTRIC STIMULATION THERAPY: CPT | Performed by: PHYSICAL THERAPIST

## 2018-06-18 PROCEDURE — 97110 THERAPEUTIC EXERCISES: CPT | Performed by: PHYSICAL THERAPIST

## 2018-06-18 PROCEDURE — G0283 ELEC STIM OTHER THAN WOUND: HCPCS | Performed by: PHYSICAL THERAPIST

## 2018-06-18 NOTE — PROGRESS NOTES
Daily Note     Today's date: 2018  Patient name: sAhley Page  : 1995  MRN: [id number]  Referring provider: Spenser Brush MD  Dx:   Encounter Diagnosis     ICD-10-CM    1  Achilles tendon disorder, right M67 971                   Subjective: Significant posterior achilles tenderness persists      Objective: See treatment diary below    Manual              IASTM And STM - manual                         DF stretching  5 reps hold 30 sec  Exercise Diary              R bike 10 minutes            Eccentric heel drops on stair On Leg press - 3 sets of 15 reps            Eccentric MET R ankle nt            2 way (KF/KE) step str 5 reps hold 30 sec             2 way prostretch nt            HS strap str nt                         TB isometric clamshell nt            TB iso reverse clamshell nt            TB iso S/L hip ABD nt            TB iso fire hydrant nt                                                                                                                                     Modalities              HV with ice 10 minutes                                            Assessment: Tolerated treatment well  Patient would benefit from continued PT  Poor tolerance to exercises  Plan: Continue per plan of care

## 2018-06-20 ENCOUNTER — APPOINTMENT (OUTPATIENT)
Dept: PHYSICAL THERAPY | Age: 23
End: 2018-06-20
Payer: COMMERCIAL

## 2018-06-25 ENCOUNTER — OFFICE VISIT (OUTPATIENT)
Dept: PHYSICAL THERAPY | Age: 23
End: 2018-06-25
Payer: COMMERCIAL

## 2018-06-25 DIAGNOSIS — M67.971 ACHILLES TENDON DISORDER, RIGHT: Primary | ICD-10-CM

## 2018-06-25 PROCEDURE — 97014 ELECTRIC STIMULATION THERAPY: CPT | Performed by: PHYSICAL THERAPIST

## 2018-06-25 PROCEDURE — G0283 ELEC STIM OTHER THAN WOUND: HCPCS | Performed by: PHYSICAL THERAPIST

## 2018-06-25 PROCEDURE — 97140 MANUAL THERAPY 1/> REGIONS: CPT | Performed by: PHYSICAL THERAPIST

## 2018-06-25 PROCEDURE — 97110 THERAPEUTIC EXERCISES: CPT | Performed by: PHYSICAL THERAPIST

## 2018-06-25 NOTE — PROGRESS NOTES
Daily Note     Today's date: 2018  Patient name: Roland Bojorquez  : 1995  MRN: [id number]  Referring provider: Giovany Haynes MD  Dx:   Encounter Diagnosis     ICD-10-CM    1  Achilles tendon disorder, right M67 971                   Subjective: No change      Objective: See treatment diary below    Manual             IASTM And STM - manual completed - manual also                        DF stretching  5 reps hold 30 sec  5 reps hold 30 sec  Exercise Diary              R bike 10 minutes 10 minutes           Eccentric heel drops on stair On Leg press - 3 sets of 15 reps completed           Eccentric MET R ankle nt nt           2 way (KF/KE) step str 5 reps hold 30 sec  completed           2 way prostretch nt            HS strap str nt                         TB isometric clamshell nt            TB iso reverse clamshell nt            TB iso S/L hip ABD nt            TB iso fire hydrant nt                                                                                                                                     Modalities              HV with ice 10 minutes 10 minutes                                               Assessment: Tolerated treatment well  Patient would benefit from continued PT  No change  Plan: Continue per plan of care

## 2018-06-27 ENCOUNTER — OFFICE VISIT (OUTPATIENT)
Dept: PHYSICAL THERAPY | Age: 23
End: 2018-06-27
Payer: COMMERCIAL

## 2018-06-27 DIAGNOSIS — M67.971 ACHILLES TENDON DISORDER, RIGHT: Primary | ICD-10-CM

## 2018-06-27 PROCEDURE — G0283 ELEC STIM OTHER THAN WOUND: HCPCS | Performed by: PHYSICAL THERAPIST

## 2018-06-27 PROCEDURE — 97110 THERAPEUTIC EXERCISES: CPT | Performed by: PHYSICAL THERAPIST

## 2018-06-27 PROCEDURE — 97014 ELECTRIC STIMULATION THERAPY: CPT | Performed by: PHYSICAL THERAPIST

## 2018-06-27 PROCEDURE — 97140 MANUAL THERAPY 1/> REGIONS: CPT | Performed by: PHYSICAL THERAPIST

## 2018-06-27 NOTE — PROGRESS NOTES
Daily Note     Today's date: 2018  Patient name: Javier Rosenthal  : 1995  MRN: [id number]  Referring provider: Analilia Mao MD  Dx:   Encounter Diagnosis     ICD-10-CM    1  Achilles tendon disorder, right M67 971                   Subjective: No significant change noted      Objective: See treatment diary below    Manual            IASTM And STM - manual completed - manual also completed                       DF stretching  5 reps hold 30 sec  5 reps hold 30 sec  5 reps hold 30 sec  Exercise Diary              R bike 10 minutes 10 minutes 10 minutes          Eccentric heel drops on stair On Leg press - 3 sets of 15 reps completed completed          Eccentric MET R ankle nt nt nt          2 way (KF/KE) step str 5 reps hold 30 sec  completed completed          2 way prostretch nt            HS strap str nt                         TB isometric clamshell nt            TB iso reverse clamshell nt            TB iso S/L hip ABD nt            TB iso fire hydrant nt                                                                                                                                     Modalities              HV with ice 10 minutes 10 minutes 10 minutes                                              Assessment: Tolerated treatment well  Patient would benefit from continued PT   D/C due to patient leaving for [place]  Plan: Continue per plan of care

## 2018-09-07 ENCOUNTER — HOSPITAL ENCOUNTER (OUTPATIENT)
Dept: RADIOLOGY | Facility: HOSPITAL | Age: 23
Discharge: HOME | End: 2018-09-07
Attending: INTERNAL MEDICINE
Payer: COMMERCIAL

## 2018-09-07 ENCOUNTER — TRANSCRIBE ORDERS (OUTPATIENT)
Dept: ADMINISTRATIVE | Facility: HOSPITAL | Age: 23
End: 2018-09-07

## 2018-09-07 DIAGNOSIS — M25.561 RIGHT KNEE PAIN, UNSPECIFIED CHRONICITY: ICD-10-CM

## 2018-09-07 DIAGNOSIS — M25.561 RIGHT KNEE PAIN, UNSPECIFIED CHRONICITY: Primary | ICD-10-CM

## 2018-09-07 PROCEDURE — 73560 X-RAY EXAM OF KNEE 1 OR 2: CPT

## 2018-09-14 ENCOUNTER — OFFICE VISIT (OUTPATIENT)
Dept: OBGYN CLINIC | Facility: CLINIC | Age: 23
End: 2018-09-14
Payer: COMMERCIAL

## 2018-09-14 VITALS
DIASTOLIC BLOOD PRESSURE: 79 MMHG | SYSTOLIC BLOOD PRESSURE: 124 MMHG | HEART RATE: 60 BPM | HEIGHT: 62 IN | BODY MASS INDEX: 21.35 KG/M2 | WEIGHT: 116 LBS

## 2018-09-14 DIAGNOSIS — M25.561 ACUTE PAIN OF RIGHT KNEE: Primary | ICD-10-CM

## 2018-09-14 PROCEDURE — 99213 OFFICE O/P EST LOW 20 MIN: CPT | Performed by: ORTHOPAEDIC SURGERY

## 2018-09-14 NOTE — PROGRESS NOTES
Patient Name:  Jett Rapp  MRN:  [id number]    Assessment & Plan    Ongoing right knee pain since August of 2017 with concern for possible medial meniscus tear  1  MRI right knee for further evaluation  2  Activities as tolerated with modification to avoid pain  3  Follow-up after MRI      Chief Complaint    Right knee pain    History of the Present Illness    82-year-old female reports to the office today for evaluation of her right knee  She notes ongoing knee pain since August of 2017  She states the pain began while hiking in [place]  She denies any specific injury or trauma  She did see Dr Shellie Noe initially in September of 2017 who referred her to physical therapy and prescribed her Pennsaid Cream    She noted minimal to no improvement with the above conservative measures  She notes persistent pain on the anterior medial aspect of the knee  Pain is worse with all activities including walking specifically up and down steps  She does note occasional weakness and instability as well as swelling  No stiffness  No numbness or tingling  No fevers or chills  Physical Exam    /79   Pulse 60   Ht 5' 1 75" (1 568 m)   Wt 52 6 kg (116 lb)   BMI 21 39 kg/m²     Right knee:  No gross deformity  Skin intact  No erythema ecchymosis or swelling  Trace effusion  No medial or lateral joint line tenderness  Full range of motion with discomfort noted at terminal flexion  Stable to varus and valgus stress  Stable Lachman test   Positive Steph's test   Patellar apprehension test   Sensation intact right lower extremity  Constitutional:  Well-developed and well-nourished  Eyes:  Anicteric sclerae  Neck:  Supple  Lungs:  Unlabored breathing  Cardiovascular:  Capillary refill is less than 2 seconds  Skin:  Intact without erythema  Neurologic:  Sensation intact to light touch  Psychiatric:  Mood and affect are appropriate        Data Review    I have personally reviewed pertinent films in PACS, and my interpretation follows  X-rays previously performed of the right knee reveals no acute osseous abnormalities  Past Medical History:   Diagnosis Date    Anxiety     Anxiety and depression     Depression     Fracture of fibula     R Salter I       Past Surgical History:   Procedure Laterality Date    WISDOM TOOTH EXTRACTION      WRIST SURGERY      left; Excision of ganglion       Allergies   Allergen Reactions    Iodides Throat Swelling     Reaction Date: 28Jul2014; Action Taken: none; Category: Allergy;     Iodine        Current Outpatient Prescriptions on File Prior to Visit   Medication Sig Dispense Refill    levonorgestrel-ethinyl estradiol (NORDETTE) 0 15-30 MG-MCG per tablet Take 1 tablet by mouth daily 84 tablet 3    [DISCONTINUED] buPROPion (WELLBUTRIN) 75 mg tablet Take 75 mg by mouth daily      [DISCONTINUED] naproxen (NAPROSYN) 500 mg tablet Take 250 mg by mouth 2 (two) times a day with meals       No current facility-administered medications on file prior to visit  Social History   Substance Use Topics    Smoking status: Never Smoker    Smokeless tobacco: Never Used      Comment: Tobacco smoke exposure (Father smokes cigars)    Alcohol use No       Family History   Problem Relation Age of Onset    Hypertension Mother    [facility] Migraines Mother         Headache    Diabetes type II Mother     Varicose Veins Mother     Cholelithiasis Father     Hypertension Father     Sarcoidosis Father         Liver    Thyroid disease Sister     Cancer Family     Diabetes Family     Hypertension Family          Review of Systems    General:  Negative for fever, lethargy/malaise, or night sweats  Eyes:  Negative for blurry vision or double vision  ENT:  Negative for hearing change, nasal discharge, or sore throat  Hematological:  Negative for bleeding problems or blood clots  Endocrine:  Negative for excessive thirst or temperature intolerance    Respiratory:  Negative for cough or wheezing  Cardiovascular:  Negative for chest pain, dyspnea on exertion, or palpitations  Gastrointestinal:  Negative for abdominal pain, diarrhea, or nausea/vomiting  Musculoskeletal:  As stated in the HPI and otherwise negative  Neurological:  Negative for confusion, headaches, or seizures  Psychological:  Negative for hallucinations or mood swings  Dermatological:  Negative for itching or rash        Scribe Attestation    I,:   Salas Mendoza PA-C am acting as a scribe while in the presence of the attending physician :        I,:   Jailene Jenkins MD personally performed the services described in this documentation    as scribed in my presence :

## 2018-09-20 ENCOUNTER — HOSPITAL ENCOUNTER (OUTPATIENT)
Dept: RADIOLOGY | Age: 23
Discharge: HOME | End: 2018-09-20
Payer: COMMERCIAL

## 2018-09-20 DIAGNOSIS — M25.561 ACUTE PAIN OF RIGHT KNEE: ICD-10-CM

## 2018-09-20 PROCEDURE — 73721 MRI JNT OF LWR EXTRE W/O DYE: CPT

## 2018-09-26 ENCOUNTER — OFFICE VISIT (OUTPATIENT)
Dept: OBGYN CLINIC | Facility: CLINIC | Age: 23
End: 2018-09-26
Payer: COMMERCIAL

## 2018-09-26 VITALS
WEIGHT: 118 LBS | HEART RATE: 71 BPM | DIASTOLIC BLOOD PRESSURE: 78 MMHG | BODY MASS INDEX: 21.71 KG/M2 | SYSTOLIC BLOOD PRESSURE: 112 MMHG | HEIGHT: 62 IN

## 2018-09-26 DIAGNOSIS — M22.2X1 PATELLOFEMORAL PAIN SYNDROME OF RIGHT KNEE: Primary | ICD-10-CM

## 2018-09-26 PROCEDURE — 99213 OFFICE O/P EST LOW 20 MIN: CPT | Performed by: ORTHOPAEDIC SURGERY

## 2018-09-26 RX ORDER — LEVONORGESTREL AND ETHINYL ESTRADIOL 0.15-0.03
KIT ORAL
COMMUNITY
Start: 2018-09-21 | End: 2019-03-07 | Stop reason: SDUPTHER

## 2018-09-26 RX ORDER — LEVONORGESTREL AND ETHINYL ESTRADIOL 0.15-0.03
KIT ORAL
Refills: 2 | COMMUNITY
Start: 2018-09-21 | End: 2018-12-13 | Stop reason: ALTCHOICE

## 2018-09-26 RX ORDER — MELOXICAM 15 MG/1
15 TABLET ORAL DAILY
Qty: 30 TABLET | Refills: 0 | Status: SHIPPED | OUTPATIENT
Start: 2018-09-26 | End: 2019-02-07 | Stop reason: HOSPADM

## 2018-09-26 NOTE — PROGRESS NOTES
Patient Name:  Ale Posey  MRN:  [id number]    Assessment & Plan    Right knee patellofemoral dysfunction  1  Prescription for meloxicam   2  J sleeve brace provided in the office today  3  Follow-up as needed if pain persist   Briefly discussed return to physical therapy if pain persists  Subjective    44-year-old female returns to the office today for follow-up regarding her right knee  She recently underwent an MRI and is here to review the results  She notes persistent pain on the anterior medial aspect of the knee which has actually worsened since her last visit  Pain is worse with walking up and down steps  No numbness or tingling  No significant swelling or stiffness  No weakness or instability  No fevers or chills  Objective    /78 (BP Location: Left arm, Patient Position: Sitting, Cuff Size: Adult)   Pulse 71   Ht 5' 2" (1 575 m)   Wt 53 5 kg (118 lb)   BMI 21 58 kg/m²       Data Review    I have personally reviewed pertinent films in PACS, and my interpretation follows  MRI right knee reveals no abnormalities  Menisci are intact      Counseling    The patient was counseled regarding diagnostic results, impressions, patient/family education, instructions for management, risks and benefits of treatment options, and prognosis  The total time of the encounter was 15 minutes, and more than 50% of that time was spent in counseling and coordination of care        Scribe Attestation    I,:   George Pemberton PA-C am acting as a scribe while in the presence of the attending physician :        I,:   Iqra Faria MD personally performed the services described in this documentation    as scribed in my presence :

## 2018-11-06 ENCOUNTER — TELEPHONE (OUTPATIENT)
Dept: OBGYN CLINIC | Facility: HOSPITAL | Age: 23
End: 2018-11-06

## 2018-11-06 NOTE — TELEPHONE ENCOUNTER
Not PCP  "Primary care sports medicine" ie   Dr Nanette Reveles, Dr Aliza Lemus, Dr June Oliva, Dr Masoud Herr

## 2018-11-06 NOTE — TELEPHONE ENCOUNTER
Caller: Patient   C/B #:   Dr Ketty Reyes    Patient states she is still in a lot of pain and she is not seeing any improvement  Per patient last time she was in the office she was advised there was nothing more that can be done  I offered to have her come in but she wants to speak with someone in the office instead of coming in  She wants to know if you would refer her to someone else or should she come in    Thanks

## 2018-11-06 NOTE — TELEPHONE ENCOUNTER
Left voice message for patient to either call us back to schedule with sports med or call her PCP to be seen   Thank you

## 2018-11-08 ENCOUNTER — OFFICE VISIT (OUTPATIENT)
Dept: OBGYN CLINIC | Facility: CLINIC | Age: 23
End: 2018-11-08
Payer: COMMERCIAL

## 2018-11-08 VITALS
HEART RATE: 67 BPM | SYSTOLIC BLOOD PRESSURE: 109 MMHG | HEIGHT: 62 IN | WEIGHT: 122.8 LBS | DIASTOLIC BLOOD PRESSURE: 73 MMHG | BODY MASS INDEX: 22.6 KG/M2

## 2018-11-08 DIAGNOSIS — M22.2X1 PATELLOFEMORAL PAIN SYNDROME OF RIGHT KNEE: Primary | ICD-10-CM

## 2018-11-08 PROCEDURE — 99213 OFFICE O/P EST LOW 20 MIN: CPT | Performed by: ORTHOPAEDIC SURGERY

## 2018-11-08 NOTE — PROGRESS NOTES
Assessment:       1  Patellofemoral pain syndrome of right knee          Plan:        Explained my current clinical findings to Minna Haines today  This is clinically and radiologically consistent with a right patellofemoral pain syndrome with some patellar maltracking  She still has residual weakness of her right knee extension and hip abduction for which I have advised her to do strengthening exercises  She may also benefit from wearing a gel heel cushion  I gave her the option of intra-articular cortisone or viscosupplementation injection to help with her pain since oral pain medications and physical therapy as well as bracing has not improved her symptoms so far  She is currently not decided on getting any knee injections and would like to call us back if her pain persists despite further exercise regimen  Subjective:     Patient ID: Claire Vicente is a 26 y o  female  Chief Complaint:    HPI  Evin Galicia is a 21-year-old lady with a history of right anterior and anteromedial knee pain for about an year and worse over the last 4 months  No specific injury prior to the onset of symptoms  She has already been evaluated in this regard by Dr Eduardo Schuster nearly 2 months ago  Has had an MRI of her right knee which did not reveal any internal derangement or arthritic changes  Pain is aching nonradiating made worse with walking and knee flexion  Also associates this with intermittent anterior knee clicking  She has taken oral nonsteroidal anti-inflammatory medication, done physical therapy rehabilitation in the past and also wearing a lateral patellar stabilizer brace  Unfortunately, she continues to experience at least moderate intensity right anterior knee pain  Social History     Occupational History    Not on file       Social History Main Topics    Smoking status: Never Smoker    Smokeless tobacco: Never Used      Comment: Tobacco smoke exposure (Father smokes cigars)    Alcohol use No    Drug use: No    Sexual activity: Yes     Partners: Male      Comment: Per [employer]:  Not currently sexually active/Sexually active      Review of Systems   Constitutional: Negative  HENT: Negative  Eyes: Negative  Respiratory: Negative  Cardiovascular: Negative  Gastrointestinal: Negative  Endocrine: Negative  Genitourinary: Negative  Skin: Negative  Allergic/Immunologic: Negative  Neurological: Negative  Hematological: Negative  Psychiatric/Behavioral: Negative  Objective:     Ortho ExamPhysical Exam   Constitutional: She is oriented to person, place, and time  She appears well-developed and well-nourished  HENT:   Head: Normocephalic and atraumatic  Eyes: Pupils are equal, round, and reactive to light  Conjunctivae are normal    Cardiovascular: Normal rate and regular rhythm  Pulmonary/Chest: Effort normal  No respiratory distress  Neurological: She is alert and oriented to person, place, and time  No cranial nerve deficit  Skin: Skin is warm and dry  No erythema  Psychiatric: She has a normal mood and affect  Her behavior is normal  Judgment and thought content normal        right Knee    Swelling: None   Effusion: Negative   Tenderness: Tender over the proximal patellar tendon, medial patellar facet and anteromedial joint line       Range of Motion:      Extension: Normal     Flexion: Normal but discomfort with terminal flexion       McMurrays: Negative   Anterior Lachmann: Negative   Posterior Lachmann: Negative   Anterior Drawer: Negative   Posterior Drawer: Negative   Varus Stress Test: Negative   Valgus Stress Test: Negative   Pivot Shift: Not performed   Patellar Apprehension: Mild   Right knee extension strength and right hip abduction is 4/5 in strength  I have personally reviewed pertinent films in PACS

## 2018-12-13 ENCOUNTER — OFFICE VISIT (OUTPATIENT)
Dept: INTERNAL MEDICINE CLINIC | Facility: CLINIC | Age: 23
End: 2018-12-13
Payer: COMMERCIAL

## 2018-12-13 VITALS
RESPIRATION RATE: 18 BRPM | TEMPERATURE: 98.6 F | DIASTOLIC BLOOD PRESSURE: 68 MMHG | WEIGHT: 119 LBS | BODY MASS INDEX: 21.9 KG/M2 | SYSTOLIC BLOOD PRESSURE: 112 MMHG | HEIGHT: 62 IN | HEART RATE: 75 BPM | OXYGEN SATURATION: 99 %

## 2018-12-13 DIAGNOSIS — R53.81 CHRONIC FATIGUE AND MALAISE: Primary | ICD-10-CM

## 2018-12-13 DIAGNOSIS — Z13.6 SCREENING FOR HEART DISEASE: ICD-10-CM

## 2018-12-13 DIAGNOSIS — R10.11 CHRONIC RUQ PAIN: ICD-10-CM

## 2018-12-13 DIAGNOSIS — R79.89 ABNORMAL LIVER FUNCTION TEST: ICD-10-CM

## 2018-12-13 DIAGNOSIS — Z87.898 HISTORY OF PREDIABETES: ICD-10-CM

## 2018-12-13 DIAGNOSIS — G89.29 CHRONIC RUQ PAIN: ICD-10-CM

## 2018-12-13 DIAGNOSIS — R53.82 CHRONIC FATIGUE AND MALAISE: Primary | ICD-10-CM

## 2018-12-13 DIAGNOSIS — F32.A DEPRESSION, UNSPECIFIED DEPRESSION TYPE: ICD-10-CM

## 2018-12-13 PROCEDURE — 99204 OFFICE O/P NEW MOD 45 MIN: CPT | Performed by: NURSE PRACTITIONER

## 2018-12-13 RX ORDER — FLUTICASONE PROPIONATE 50 MCG
1 SPRAY, SUSPENSION (ML) NASAL DAILY
COMMUNITY
End: 2019-02-07 | Stop reason: HOSPADM

## 2018-12-13 NOTE — ASSESSMENT & PLAN NOTE
Recent testing from her previous doctor showed slight elevation of the AST and bilirubin which then resolved on blood tests when this was repeated  She did have also a test for hepatitis a IgM which appeared to be reactive  I have ordered a CMP, CBC, and a hepatitis panel for further evaluation as noted below, she does have right upper quadrant pain and an enlarged liver on exam she will undergo an abdominal ultrasound for further evaluation

## 2018-12-13 NOTE — ASSESSMENT & PLAN NOTE
PHQ-9 score today is 9  Patient states that previous treatment for depression was CBT  She has stopped going over the past couple months because she felt that this was enabling her to continue to feel upset and give her an excuse to complain she feels that her symptoms have been stable since she stopped having her therapy sessions however  She has never taken medication for treatment of depression or anxiety and is not interested at this time  Will continue to monitor

## 2018-12-13 NOTE — ASSESSMENT & PLAN NOTE
Symptoms have been present for over 2 years  Recent CBC and TSH from November were within normal limits  I am repeating these tests at this time as well as other tests as ordered  Discussed this with patient, her diet does appear to be lacking in nutrition and this may potentially be related  In addition she does have untreated depression at this time which may also be contributory

## 2018-12-13 NOTE — PROGRESS NOTES
Assessment/Plan:    History of prediabetes  Reviewed the blood test that patient had completed in November which does show an impaired fasting glucose  We reviewed the patient's diet and she does appear to eat a diet heavy in carbohydrates, she does have family history of diabetes as well  I have referred her to nutritionist for evaluation and recommendations  I am also going to have her complete an A1c is well as a repeat CMP  Will continue to monitor this  Abnormal liver function test  Recent testing from her previous doctor showed slight elevation of the AST and bilirubin which then resolved on blood tests when this was repeated  She did have also a test for hepatitis a IgM which appeared to be reactive  I have ordered a CMP, CBC, and a hepatitis panel for further evaluation as noted below, she does have right upper quadrant pain and an enlarged liver on exam she will undergo an abdominal ultrasound for further evaluation  Chronic fatigue and malaise  Symptoms have been present for over 2 years  Recent CBC and TSH from November were within normal limits  I am repeating these tests at this time as well as other tests as ordered  Discussed this with patient, her diet does appear to be lacking in nutrition and this may potentially be related  In addition she does have untreated depression at this time which may also be contributory  Chronic RUQ pain  See discussion above, patient sent for abdominal ultrasound for evaluation of right upper quadrant pain in addition to enlargement of the liver on exam     Depression  PHQ-9 score today is 9  Patient states that previous treatment for depression was CBT  She has stopped going over the past couple months because she felt that this was enabling her to continue to feel upset and give her an excuse to complain she feels that her symptoms have been stable since she stopped having her therapy sessions however    She has never taken medication for treatment of depression or anxiety and is not interested at this time  Will continue to monitor  Screening for heart disease  Positive family history, poor diet  Lipid panel added to the rest of her blood work for assessment  Diagnoses and all orders for this visit:    Chronic fatigue and malaise  -     CBC and differential; Future  -     TSH, 3rd generation with Free T4 reflex; Future    History of prediabetes  -     HEMOGLOBIN A1C W/ EAG ESTIMATION; Future  -     Ambulatory referral to Nutrition Services; Future    Screening for heart disease  -     Lipid panel; Future    Abnormal liver function test  -     Hepatitis panel, acute; Future  -     CBC and differential; Future    Chronic RUQ pain  -     Hepatitis panel, acute; Future  -     CBC and differential; Future  -     Lipase; Future  -     Amylase; Future  -     US abdomen complete; Future    Depression, unspecified depression type    Other orders  -     fluticasone (FLONASE) 50 mcg/act nasal spray; 1 spray into each nostril daily          Subjective:      Patient ID: Herman Jama is a 26 y o  female  Pt is a 26y o  year old female who is here today as a new pt to establish care  PMH depression and anxiety, hpylori, and prediabetes  She also has a history of anorexia nervosa and bulemia  We reviewed age based screening recommendations; she is up to date on her cervical cancer screenings  She does go to a dentist regularly  Pt reports a poor diet, high in sweets and low in fresh fruits and vegetables  She is concerned about blood work results that she received from testing ordered by her previous doctor  Blood glucose on a [facility] 11/3 was 106; her AST and bilirubin were elevated as well  These labs were followed up with a test for hepatitis a which did come back as reactive and a hepatic panel which was all within normal limits      Pt states that she gets sick every 2-3 weeks with symptoms similar to cold/flu, fever, and when she gets sick she takes tylenol or mucinex and symptoms resolve after about 2-3 days  She feels very fatigued despite getting adequate sleep for the past 2 years  Her recent blood work did include a TSH and CBC both of which were normal     We reviewed her diet extensively and her diet does appear to be lacking in nutrition  She eats a carbohydrate heavy diet with little fresh fruit or vegetables     She eats a lot of fast food or prepared/convenience foods  She does not currently report regular exercise but she was exercising regularly until about 1 month ago  The following portions of the patient's history were reviewed and updated as appropriate: allergies, current medications, past family history, past medical history, past social history, past surgical history and problem list     Review of Systems   Constitutional: Positive for fatigue  Negative for activity change, appetite change, chills, fever and unexpected weight change  HENT: Positive for congestion  Negative for hearing loss  Eyes: Negative for visual disturbance  Respiratory: Negative for cough, chest tightness and shortness of breath  Cardiovascular: Negative for chest pain, palpitations and leg swelling  Gastrointestinal: Positive for abdominal pain  Negative for abdominal distention, blood in stool, constipation, diarrhea, nausea and vomiting  Patient reports that her appearance of her stool has recently changed and she says it now softer, it is greasy in appearance and is lighter in color  Genitourinary: Negative for dysuria and frequency  Musculoskeletal: Negative for arthralgias and myalgias  Skin:        Patient is concerned by rough, dry skin on the arms and legs   Neurological: Positive for headaches  Negative for dizziness, weakness and numbness  Psychiatric/Behavioral: Positive for dysphoric mood  Negative for sleep disturbance  The patient is not nervous/anxious            Objective:      /68 (BP Location: Left arm, Patient Position: Sitting, Cuff Size: Standard)   Pulse 75   Temp 98 6 °F (37 °C)   Resp 18   Ht 5' 2" (1 575 m)   Wt 54 kg (119 lb)   SpO2 99%   BMI 21 77 kg/m²          Physical Exam   Constitutional: She is oriented to person, place, and time  Vital signs are normal  She appears well-developed and well-nourished  She is cooperative  HENT:   Right Ear: Hearing, tympanic membrane, external ear and ear canal normal    Left Ear: Hearing, tympanic membrane, external ear and ear canal normal    Nose: Mucosal edema present  Mouth/Throat: Uvula is midline, oropharynx is clear and moist and mucous membranes are normal    Eyes: Pupils are equal, round, and reactive to light  Conjunctivae and lids are normal    Neck: No JVD present  Carotid bruit is not present  No thyromegaly present  Cardiovascular: Normal rate, regular rhythm, normal heart sounds and intact distal pulses  No murmur heard  Pulmonary/Chest: Effort normal and breath sounds normal  No respiratory distress  Abdominal: Soft  Normal appearance and bowel sounds are normal  There is hepatomegaly  There is tenderness in the right upper quadrant and right lower quadrant  There is no rigidity, no rebound, no guarding, no tenderness at McBurney's point and negative Barroso's sign  No hernia  Musculoskeletal: Normal range of motion  She exhibits no edema  Lymphadenopathy:        Head (right side): No submental, no submandibular, no tonsillar, no preauricular, no posterior auricular and no occipital adenopathy present  Head (left side): No submental, no submandibular, no tonsillar, no preauricular, no posterior auricular and no occipital adenopathy present  She has no cervical adenopathy  Neurological: She is alert and oriented to person, place, and time  She has normal strength and normal reflexes  No sensory deficit  Skin: Skin is warm, dry and intact  No rash noted     Psychiatric: She has a normal mood and affect  Her speech is normal and behavior is normal  Judgment and thought content normal  Cognition and memory are normal    Vitals reviewed

## 2018-12-13 NOTE — ASSESSMENT & PLAN NOTE
Reviewed the blood test that patient had completed in November which does show an impaired fasting glucose  We reviewed the patient's diet and she does appear to eat a diet heavy in carbohydrates, she does have family history of diabetes as well  I have referred her to nutritionist for evaluation and recommendations  I am also going to have her complete an A1c is well as a repeat CMP  Will continue to monitor this

## 2018-12-13 NOTE — ASSESSMENT & PLAN NOTE
See discussion above, patient sent for abdominal ultrasound for evaluation of right upper quadrant pain in addition to enlargement of the liver on exam

## 2018-12-18 LAB
AMYLASE SERPL-CCNC: 31 U/L (ref 21–101)
BASOPHILS # BLD AUTO: 31 CELLS/UL (ref 0–200)
BASOPHILS NFR BLD AUTO: 0.6 %
CHOLEST SERPL-MCNC: 200 MG/DL
CHOLEST/HDLC SERPL: 3.9 (CALC)
EOSINOPHIL # BLD AUTO: 99 CELLS/UL (ref 15–500)
EOSINOPHIL NFR BLD AUTO: 1.9 %
ERYTHROCYTE [DISTWIDTH] IN BLOOD BY AUTOMATED COUNT: 12.1 % (ref 11–15)
EST. AVERAGE GLUCOSE BLD GHB EST-MCNC: 157 (CALC)
EST. AVERAGE GLUCOSE BLD GHB EST-SCNC: 8.7 (CALC)
HAV IGM SERPL QL IA: NORMAL
HBA1C MFR BLD: 7.1 % OF TOTAL HGB
HBV CORE IGM SERPL QL IA: NORMAL
HBV SURFACE AG SERPL QL IA: NORMAL
HCT VFR BLD AUTO: 42.3 % (ref 35–45)
HCV AB S/CO SERPL IA: 0.02
HCV AB SERPL QL IA: NORMAL
HDLC SERPL-MCNC: 51 MG/DL
HGB BLD-MCNC: 14.4 G/DL (ref 11.7–15.5)
LDLC SERPL CALC-MCNC: 133 MG/DL (CALC)
LIPASE SERPL-CCNC: 24 U/L (ref 7–60)
LYMPHOCYTES # BLD AUTO: 2657 CELLS/UL (ref 850–3900)
LYMPHOCYTES NFR BLD AUTO: 51.1 %
MCH RBC QN AUTO: 29.3 PG (ref 27–33)
MCHC RBC AUTO-ENTMCNC: 34 G/DL (ref 32–36)
MCV RBC AUTO: 86.2 FL (ref 80–100)
MONOCYTES # BLD AUTO: 426 CELLS/UL (ref 200–950)
MONOCYTES NFR BLD AUTO: 8.2 %
NEUTROPHILS # BLD AUTO: 1986 CELLS/UL (ref 1500–7800)
NEUTROPHILS NFR BLD AUTO: 38.2 %
NONHDLC SERPL-MCNC: 149 MG/DL (CALC)
PLATELET # BLD AUTO: 183 THOUSAND/UL (ref 140–400)
PMV BLD REES-ECKER: 12.9 FL (ref 7.5–12.5)
RBC # BLD AUTO: 4.91 MILLION/UL (ref 3.8–5.1)
TRIGL SERPL-MCNC: 65 MG/DL
TSH SERPL-ACNC: 1.15 MIU/L
WBC # BLD AUTO: 5.2 THOUSAND/UL (ref 3.8–10.8)

## 2018-12-19 ENCOUNTER — HOSPITAL ENCOUNTER (OUTPATIENT)
Dept: RADIOLOGY | Facility: HOSPITAL | Age: 23
Discharge: HOME | End: 2018-12-19
Payer: COMMERCIAL

## 2018-12-19 DIAGNOSIS — R10.11 CHRONIC RUQ PAIN: ICD-10-CM

## 2018-12-19 DIAGNOSIS — G89.29 CHRONIC RUQ PAIN: ICD-10-CM

## 2018-12-19 PROCEDURE — 76700 US EXAM ABDOM COMPLETE: CPT

## 2019-01-17 ENCOUNTER — OFFICE VISIT (OUTPATIENT)
Dept: INTERNAL MEDICINE CLINIC | Facility: CLINIC | Age: 24
End: 2019-01-17
Payer: COMMERCIAL

## 2019-01-17 ENCOUNTER — APPOINTMENT (OUTPATIENT)
Dept: LAB | Facility: HOSPITAL | Age: 24
End: 2019-01-17
Payer: COMMERCIAL

## 2019-01-17 VITALS
HEART RATE: 70 BPM | OXYGEN SATURATION: 98 % | TEMPERATURE: 98.4 F | WEIGHT: 113 LBS | SYSTOLIC BLOOD PRESSURE: 114 MMHG | BODY MASS INDEX: 20.8 KG/M2 | HEIGHT: 62 IN | DIASTOLIC BLOOD PRESSURE: 72 MMHG | RESPIRATION RATE: 16 BRPM

## 2019-01-17 DIAGNOSIS — R79.89 ABNORMAL LIVER FUNCTION TEST: ICD-10-CM

## 2019-01-17 DIAGNOSIS — E13.9 OTHER SPECIFIED DIABETES MELLITUS WITHOUT COMPLICATION, WITHOUT LONG-TERM CURRENT USE OF INSULIN (HCC): Primary | ICD-10-CM

## 2019-01-17 DIAGNOSIS — R10.11 CHRONIC RUQ PAIN: ICD-10-CM

## 2019-01-17 DIAGNOSIS — R53.82 CHRONIC FATIGUE AND MALAISE: ICD-10-CM

## 2019-01-17 DIAGNOSIS — F41.9 ANXIETY AND DEPRESSION: ICD-10-CM

## 2019-01-17 DIAGNOSIS — R53.81 CHRONIC FATIGUE AND MALAISE: ICD-10-CM

## 2019-01-17 DIAGNOSIS — F32.A ANXIETY AND DEPRESSION: ICD-10-CM

## 2019-01-17 DIAGNOSIS — E13.9 OTHER SPECIFIED DIABETES MELLITUS WITHOUT COMPLICATION, WITHOUT LONG-TERM CURRENT USE OF INSULIN (HCC): ICD-10-CM

## 2019-01-17 DIAGNOSIS — Z87.898 HISTORY OF PREDIABETES: ICD-10-CM

## 2019-01-17 DIAGNOSIS — G89.29 CHRONIC RUQ PAIN: ICD-10-CM

## 2019-01-17 DIAGNOSIS — R19.8 IRREGULAR BOWEL HABITS: ICD-10-CM

## 2019-01-17 DIAGNOSIS — Z13.6 SCREENING FOR HEART DISEASE: ICD-10-CM

## 2019-01-17 PROBLEM — E11.9 DIABETES MELLITUS (HCC): Status: ACTIVE | Noted: 2019-01-17

## 2019-01-17 LAB
ALBUMIN SERPL BCP-MCNC: 4.7 G/DL (ref 3.5–5)
ALP SERPL-CCNC: 89 U/L (ref 46–116)
ALT SERPL W P-5'-P-CCNC: 21 U/L (ref 12–78)
AMYLASE SERPL-CCNC: 34 IU/L (ref 25–115)
ANION GAP SERPL CALCULATED.3IONS-SCNC: 9 MMOL/L (ref 4–13)
AST SERPL W P-5'-P-CCNC: 15 U/L (ref 5–45)
BACTERIA UR QL AUTO: ABNORMAL /HPF
BASOPHILS # BLD AUTO: 0.03 THOUSANDS/ΜL (ref 0–0.1)
BASOPHILS NFR BLD AUTO: 0 % (ref 0–1)
BILIRUB SERPL-MCNC: 1.81 MG/DL (ref 0.2–1)
BILIRUB UR QL STRIP: NEGATIVE
BUN SERPL-MCNC: 15 MG/DL (ref 5–25)
CALCIUM SERPL-MCNC: 9.4 MG/DL (ref 8.3–10.1)
CHLORIDE SERPL-SCNC: 101 MMOL/L (ref 100–108)
CHOLEST SERPL-MCNC: 221 MG/DL (ref 50–200)
CLARITY UR: CLEAR
CO2 SERPL-SCNC: 25 MMOL/L (ref 21–32)
COLOR UR: YELLOW
CREAT SERPL-MCNC: 0.85 MG/DL (ref 0.6–1.3)
EOSINOPHIL # BLD AUTO: 0.12 THOUSAND/ΜL (ref 0–0.61)
EOSINOPHIL NFR BLD AUTO: 2 % (ref 0–6)
ERYTHROCYTE [DISTWIDTH] IN BLOOD BY AUTOMATED COUNT: 11.9 % (ref 11.6–15.1)
EST. AVERAGE GLUCOSE BLD GHB EST-MCNC: 203 MG/DL
GFR SERPL CREATININE-BSD FRML MDRD: 98 ML/MIN/1.73SQ M
GLUCOSE P FAST SERPL-MCNC: 157 MG/DL (ref 65–99)
GLUCOSE UR STRIP-MCNC: ABNORMAL MG/DL
HBA1C MFR BLD: 8.7 % (ref 4.2–6.3)
HCT VFR BLD AUTO: 43.4 % (ref 34.8–46.1)
HDLC SERPL-MCNC: 56 MG/DL (ref 40–60)
HGB BLD-MCNC: 14.7 G/DL (ref 11.5–15.4)
HGB UR QL STRIP.AUTO: NEGATIVE
HYALINE CASTS #/AREA URNS LPF: ABNORMAL /LPF
IMM GRANULOCYTES # BLD AUTO: 0.03 THOUSAND/UL (ref 0–0.2)
IMM GRANULOCYTES NFR BLD AUTO: 0 % (ref 0–2)
KETONES UR STRIP-MCNC: ABNORMAL MG/DL
LDLC SERPL CALC-MCNC: 150 MG/DL (ref 0–100)
LEUKOCYTE ESTERASE UR QL STRIP: ABNORMAL
LIPASE SERPL-CCNC: 147 U/L (ref 73–393)
LYMPHOCYTES # BLD AUTO: 3.7 THOUSANDS/ΜL (ref 0.6–4.47)
LYMPHOCYTES NFR BLD AUTO: 53 % (ref 14–44)
MCH RBC QN AUTO: 28.7 PG (ref 26.8–34.3)
MCHC RBC AUTO-ENTMCNC: 33.9 G/DL (ref 31.4–37.4)
MCV RBC AUTO: 85 FL (ref 82–98)
MONOCYTES # BLD AUTO: 0.53 THOUSAND/ΜL (ref 0.17–1.22)
MONOCYTES NFR BLD AUTO: 7 % (ref 4–12)
NEUTROPHILS # BLD AUTO: 2.72 THOUSANDS/ΜL (ref 1.85–7.62)
NEUTS SEG NFR BLD AUTO: 38 % (ref 43–75)
NITRITE UR QL STRIP: NEGATIVE
NON-SQ EPI CELLS URNS QL MICRO: ABNORMAL /HPF
NONHDLC SERPL-MCNC: 165 MG/DL
NRBC BLD AUTO-RTO: 0 /100 WBCS
PH UR STRIP.AUTO: 5 [PH] (ref 4.5–8)
PLATELET # BLD AUTO: 216 THOUSANDS/UL (ref 149–390)
PMV BLD AUTO: 12 FL (ref 8.9–12.7)
POTASSIUM SERPL-SCNC: 3.5 MMOL/L (ref 3.5–5.3)
PROT SERPL-MCNC: 8.6 G/DL (ref 6.4–8.2)
PROT UR STRIP-MCNC: NEGATIVE MG/DL
RBC # BLD AUTO: 5.12 MILLION/UL (ref 3.81–5.12)
RBC #/AREA URNS AUTO: ABNORMAL /HPF
SL AMB POCT GLUCOSE BLD: 212
SODIUM SERPL-SCNC: 135 MMOL/L (ref 136–145)
SP GR UR STRIP.AUTO: 1.03 (ref 1–1.03)
TRIGL SERPL-MCNC: 74 MG/DL
TSH SERPL DL<=0.05 MIU/L-ACNC: 1.09 UIU/ML (ref 0.36–3.74)
UROBILINOGEN UR QL STRIP.AUTO: 0.2 E.U./DL
WBC # BLD AUTO: 7.13 THOUSAND/UL (ref 4.31–10.16)
WBC #/AREA URNS AUTO: ABNORMAL /HPF

## 2019-01-17 PROCEDURE — 81001 URINALYSIS AUTO W/SCOPE: CPT

## 2019-01-17 PROCEDURE — 36415 COLL VENOUS BLD VENIPUNCTURE: CPT

## 2019-01-17 PROCEDURE — 83690 ASSAY OF LIPASE: CPT

## 2019-01-17 PROCEDURE — 80074 ACUTE HEPATITIS PANEL: CPT

## 2019-01-17 PROCEDURE — 82150 ASSAY OF AMYLASE: CPT

## 2019-01-17 PROCEDURE — 84681 ASSAY OF C-PEPTIDE: CPT

## 2019-01-17 PROCEDURE — 84443 ASSAY THYROID STIM HORMONE: CPT

## 2019-01-17 PROCEDURE — 82948 REAGENT STRIP/BLOOD GLUCOSE: CPT | Performed by: NURSE PRACTITIONER

## 2019-01-17 PROCEDURE — 99214 OFFICE O/P EST MOD 30 MIN: CPT | Performed by: NURSE PRACTITIONER

## 2019-01-17 PROCEDURE — 80061 LIPID PANEL: CPT

## 2019-01-17 PROCEDURE — 80053 COMPREHEN METABOLIC PANEL: CPT

## 2019-01-17 PROCEDURE — 85025 COMPLETE CBC W/AUTO DIFF WBC: CPT

## 2019-01-17 PROCEDURE — 83036 HEMOGLOBIN GLYCOSYLATED A1C: CPT

## 2019-01-17 RX ORDER — MONTELUKAST SODIUM 10 MG/1
10 TABLET ORAL EVERY MORNING
Refills: 5 | COMMUNITY
Start: 2019-01-09 | End: 2019-04-03

## 2019-01-17 RX ORDER — CALCIUM POLYCARBOPHIL 625 MG 625 MG/1
625 TABLET ORAL DAILY
Qty: 30 TABLET | Refills: 0 | Status: SHIPPED | OUTPATIENT
Start: 2019-01-17 | End: 2019-02-27

## 2019-01-17 NOTE — ASSESSMENT & PLAN NOTE
Patient reports a very high degree of personal stress the past 2 years  She states she has been treated previously in tried multiple medications, none of which worked  She is not interested in pursuing further treatment at this time  Will continue to monitor

## 2019-01-17 NOTE — PROGRESS NOTES
Assessment/Plan:    Diabetes mellitus ([facility] )  Lab Results   Component Value Date    HGBA1C 7 1 (H) 12/15/2018       No results for input(s): POCGLU in the last 72 hours  Blood Sugar Average: Last 72 hrs:     A1c is 7 1,consistent with a diagnosis of diabetes  Random blood glucose in the office is 212 with last p  o  intake at 9:30 a m  we discussed options for management, additional testing as ordered to confirm with the patient makes insulin  Patient is a normal weight with recent weight loss despite normal diet  Continually increasing blood sugars reported despite elimination of sugar from the diet  I have ordered of CMP as this was inadvertently not ordered at her last visit as well as his C-peptide and UA for further evaluation  Patient will return next week for follow-up and we will discuss her results and felt the treatment plan  Anxiety and depression  Patient reports a very high degree of personal stress the past 2 years  She states she has been treated previously in tried multiple medications, none of which worked  She is not interested in pursuing further treatment at this time  Will continue to monitor  Irregular bowel habits  Patient reports bowel movements are every 2-3 days  It is possible that her intermittent right-sided abdominal pain could be due to bowel irregularity  She did have an abdominal ultrasound which was entirely unremarkable  I have recommended FiberCon daily for bowel regularity  We will continue to monitor for improvement of her symptoms  Diagnoses and all orders for this visit:    Other specified diabetes mellitus without complication, without long-term current use of insulin ([facility] )  -     Comprehensive metabolic panel; Future  -     UA w Reflex to Microscopic w Reflex to Culture; Future  -     C-peptide; Future  -     POCT blood glucose    Irregular bowel habits  -     calcium polycarbophil (FIBERCON) 625 mg tablet;  Take 1 tablet (625 mg total) by mouth daily    Anxiety and depression    Other orders  -     montelukast (SINGULAIR) 10 mg tablet; Take 10 mg by mouth every morning          Subjective:      Patient ID: Marie Edwards is a 26 y o  female  Patient is a 32-year-old female here today for one-month follow-up  Patient was seen in the office on 12/13 for discussion of some blood work that was completed with a previous provider  She had elevations of her AST and bilirubin as well as impaired fasting glucose  She had additional complaints of fatigue and went flu-like symptoms  Upon review of her diet is we discussed that her diet was lacking in nutrient, high in sweets and carbs, and low in fresh fruits and vegetables  Patient was sent for extensive blood work and abdominal ultrasound  Her ultrasound was negative  Her blood work did show that her A1c was 7 1, consistent with a diagnosis of diabetes  Patient states that she stopped eating all sweets after her visit in the office and she has been checking her blood sugar 1st thing in the morning every day  She states that her blood sugars at home have continue to increase in the past weeks and at times go up to the and the 300-400 range  She continues to lose weight despite an increase in p o  intake  She is positive for polydipsia and polyuria  She also states that she has ketones in her urine  She continues with fatigue and complains of right-sided abdominal pain intermittently  She reports bowel movements occur every 2-3 days  Nausea is intermittent with no vomiting or diarrhea  Patient denies chest pain, palpitations, SOB  She denies headaches, dizziness, fever chills  The following portions of the patient's history were reviewed and updated as appropriate: allergies, current medications, past family history, past medical history, past social history, past surgical history and problem list     Review of Systems   Constitutional: Positive for fatigue and unexpected weight change  Negative for activity change, appetite change, chills and fever  Eyes: Negative for visual disturbance  Respiratory: Negative for cough, chest tightness and shortness of breath  Cardiovascular: Negative for chest pain, palpitations and leg swelling  Gastrointestinal: Positive for abdominal pain  Negative for abdominal distention, blood in stool, constipation, diarrhea, nausea and vomiting  Patient reports that her appearance of her stool has recently changed and she says it now softer, it is greasy in appearance and is lighter in color  Endocrine: Positive for polydipsia and polyuria  Genitourinary: Negative for dysuria  Musculoskeletal: Negative for arthralgias and myalgias  Skin:        Patient is concerned by rough, dry skin on the arms and legs   Neurological: Negative for dizziness, weakness, numbness and headaches  Psychiatric/Behavioral: Positive for dysphoric mood  Negative for sleep disturbance  The patient is not nervous/anxious  Objective:      /72 (BP Location: Left arm, Patient Position: Sitting, Cuff Size: Standard)   Pulse 70   Temp 98 4 °F (36 9 °C)   Resp 16   Ht 5' 2" (1 575 m)   Wt 51 3 kg (113 lb)   SpO2 98%   BMI 20 67 kg/m²          Physical Exam   Constitutional: She is oriented to person, place, and time  Vital signs are normal  She appears well-developed and well-nourished  She is cooperative  HENT:   Right Ear: Hearing, tympanic membrane, external ear and ear canal normal    Left Ear: Hearing, tympanic membrane, external ear and ear canal normal    Nose: Nose normal  No mucosal edema  Mouth/Throat: Uvula is midline, oropharynx is clear and moist and mucous membranes are normal    Eyes: Pupils are equal, round, and reactive to light  Conjunctivae and lids are normal    Neck: No JVD present  Carotid bruit is not present  No thyromegaly present  Cardiovascular: Normal rate, regular rhythm, normal heart sounds and intact distal pulses  No murmur heard  Pulmonary/Chest: Effort normal and breath sounds normal  No respiratory distress  Abdominal: Soft  Normal appearance and bowel sounds are normal  There is no hepatosplenomegaly  There is tenderness in the right upper quadrant and right lower quadrant  Musculoskeletal: Normal range of motion  She exhibits no edema  Lymphadenopathy:     She has no cervical adenopathy  Neurological: She is alert and oriented to person, place, and time  Skin: Skin is warm, dry and intact  Psychiatric: Her speech is normal and behavior is normal  Judgment and thought content normal  Her mood appears anxious  Cognition and memory are normal    Vitals reviewed

## 2019-01-17 NOTE — ASSESSMENT & PLAN NOTE
Lab Results   Component Value Date    HGBA1C 7 1 (H) 12/15/2018       No results for input(s): POCGLU in the last 72 hours  Blood Sugar Average: Last 72 hrs:     A1c is 7 1,consistent with a diagnosis of diabetes  Random blood glucose in the office is 212 with last p  o  intake at 9:30 a m  we discussed options for management, additional testing as ordered to confirm with the patient makes insulin  Patient is a normal weight with recent weight loss despite normal diet  Continually increasing blood sugars reported despite elimination of sugar from the diet  I have ordered of CMP as this was inadvertently not ordered at her last visit as well as his C-peptide and UA for further evaluation  Patient will return next week for follow-up and we will discuss her results and felt the treatment plan

## 2019-01-17 NOTE — ASSESSMENT & PLAN NOTE
Patient reports bowel movements are every 2-3 days  It is possible that her intermittent right-sided abdominal pain could be due to bowel irregularity  She did have an abdominal ultrasound which was entirely unremarkable  I have recommended FiberCon daily for bowel regularity  We will continue to monitor for improvement of her symptoms

## 2019-01-18 LAB
C PEPTIDE SERPL-MCNC: 1.7 NG/ML (ref 1.1–4.4)
HAV IGM SER QL: NORMAL
HBV CORE IGM SER QL: NORMAL
HBV SURFACE AG SER QL: NORMAL
HCV AB SER QL: NORMAL

## 2019-01-21 DIAGNOSIS — E13.9 OTHER SPECIFIED DIABETES MELLITUS WITHOUT COMPLICATION, WITHOUT LONG-TERM CURRENT USE OF INSULIN (HCC): Primary | ICD-10-CM

## 2019-01-24 ENCOUNTER — HOSPITAL ENCOUNTER (INPATIENT)
Facility: HOSPITAL | Age: 24
LOS: 1 days | Discharge: HOME | DRG: 639 | End: 2019-01-26
Attending: EMERGENCY MEDICINE | Admitting: INTERNAL MEDICINE
Payer: COMMERCIAL

## 2019-01-24 ENCOUNTER — OFFICE VISIT (OUTPATIENT)
Dept: INTERNAL MEDICINE CLINIC | Facility: CLINIC | Age: 24
End: 2019-01-24
Payer: COMMERCIAL

## 2019-01-24 VITALS
SYSTOLIC BLOOD PRESSURE: 112 MMHG | TEMPERATURE: 98.3 F | RESPIRATION RATE: 16 BRPM | OXYGEN SATURATION: 98 % | HEART RATE: 78 BPM | DIASTOLIC BLOOD PRESSURE: 64 MMHG | BODY MASS INDEX: 20.61 KG/M2 | WEIGHT: 112 LBS | HEIGHT: 62 IN

## 2019-01-24 DIAGNOSIS — E11.9 DIABETES MELLITUS, NEW ONSET (HCC): ICD-10-CM

## 2019-01-24 DIAGNOSIS — R79.89 ACETONEMIA: ICD-10-CM

## 2019-01-24 DIAGNOSIS — R73.9 HYPERGLYCEMIA: Primary | ICD-10-CM

## 2019-01-24 DIAGNOSIS — R82.4 KETONURIA: ICD-10-CM

## 2019-01-24 DIAGNOSIS — E11.9 DIABETES MELLITUS, NEW ONSET (HCC): Primary | ICD-10-CM

## 2019-01-24 LAB
ACETONE SERPL-MCNC: ABNORMAL MG/DL
ALBUMIN SERPL BCP-MCNC: 4.7 G/DL (ref 3.5–5)
ALP SERPL-CCNC: 114 U/L (ref 46–116)
ALT SERPL W P-5'-P-CCNC: 21 U/L (ref 12–78)
ANION GAP SERPL CALCULATED.3IONS-SCNC: 10 MMOL/L (ref 4–13)
AST SERPL W P-5'-P-CCNC: 11 U/L (ref 5–45)
BACTERIA UR QL AUTO: NORMAL /HPF
BASE EXCESS BLDA CALC-SCNC: -1 MMOL/L (ref -2–3)
BASOPHILS # BLD AUTO: 0.03 THOUSANDS/ΜL (ref 0–0.1)
BASOPHILS NFR BLD AUTO: 0 % (ref 0–1)
BILIRUB SERPL-MCNC: 1.08 MG/DL (ref 0.2–1)
BILIRUB UR QL STRIP: NEGATIVE
BUN SERPL-MCNC: 11 MG/DL (ref 5–25)
CA-I BLD-SCNC: 1.2 MMOL/L (ref 1.12–1.32)
CALCIUM SERPL-MCNC: 9.7 MG/DL (ref 8.3–10.1)
CHLORIDE SERPL-SCNC: 100 MMOL/L (ref 100–108)
CLARITY UR: CLEAR
CO2 SERPL-SCNC: 23 MMOL/L (ref 21–32)
COLOR UR: YELLOW
COLOR, POC: YELLOW
CREAT SERPL-MCNC: 0.86 MG/DL (ref 0.6–1.3)
EOSINOPHIL # BLD AUTO: 0.11 THOUSAND/ΜL (ref 0–0.61)
EOSINOPHIL NFR BLD AUTO: 2 % (ref 0–6)
ERYTHROCYTE [DISTWIDTH] IN BLOOD BY AUTOMATED COUNT: 12 % (ref 11.6–15.1)
EXT PREG TEST URINE: NORMAL
GFR SERPL CREATININE-BSD FRML MDRD: 97 ML/MIN/1.73SQ M
GLUCOSE SERPL-MCNC: 340 MG/DL (ref 65–140)
GLUCOSE SERPL-MCNC: 342 MG/DL (ref 65–140)
GLUCOSE SERPL-MCNC: 352 MG/DL (ref 65–140)
GLUCOSE UR STRIP-MCNC: ABNORMAL MG/DL
HCO3 BLDA-SCNC: 24.6 MMOL/L (ref 24–30)
HCT VFR BLD AUTO: 44.4 % (ref 34.8–46.1)
HCT VFR BLD CALC: 47 % (ref 34.8–46.1)
HGB BLD-MCNC: 15.6 G/DL (ref 11.5–15.4)
HGB BLDA-MCNC: 16 G/DL (ref 11.5–15.4)
HGB UR QL STRIP.AUTO: ABNORMAL
HYALINE CASTS #/AREA URNS LPF: NORMAL /LPF
IMM GRANULOCYTES # BLD AUTO: 0.02 THOUSAND/UL (ref 0–0.2)
IMM GRANULOCYTES NFR BLD AUTO: 0 % (ref 0–2)
KETONES UR STRIP-MCNC: ABNORMAL MG/DL
LEUKOCYTE ESTERASE UR QL STRIP: NEGATIVE
LIPASE SERPL-CCNC: 213 U/L (ref 73–393)
LYMPHOCYTES # BLD AUTO: 3.72 THOUSANDS/ΜL (ref 0.6–4.47)
LYMPHOCYTES NFR BLD AUTO: 51 % (ref 14–44)
MCH RBC QN AUTO: 29.3 PG (ref 26.8–34.3)
MCHC RBC AUTO-ENTMCNC: 35.1 G/DL (ref 31.4–37.4)
MCV RBC AUTO: 83 FL (ref 82–98)
MONOCYTES # BLD AUTO: 0.54 THOUSAND/ΜL (ref 0.17–1.22)
MONOCYTES NFR BLD AUTO: 7 % (ref 4–12)
NEUTROPHILS # BLD AUTO: 2.89 THOUSANDS/ΜL (ref 1.85–7.62)
NEUTS SEG NFR BLD AUTO: 40 % (ref 43–75)
NITRITE UR QL STRIP: NEGATIVE
NON-SQ EPI CELLS URNS QL MICRO: NORMAL /HPF
NRBC BLD AUTO-RTO: 0 /100 WBCS
PCO2 BLD: 26 MMOL/L (ref 21–32)
PCO2 BLD: 43 MM HG (ref 42–50)
PH BLD: 7.37 [PH] (ref 7.3–7.4)
PH UR STRIP.AUTO: 5 [PH] (ref 4.5–8)
PLATELET # BLD AUTO: 166 THOUSANDS/UL (ref 149–390)
PLATELET # BLD AUTO: 178 THOUSANDS/UL (ref 149–390)
PMV BLD AUTO: 12.2 FL (ref 8.9–12.7)
PMV BLD AUTO: 12.7 FL (ref 8.9–12.7)
PO2 BLD: 16 MM HG (ref 35–45)
POTASSIUM BLD-SCNC: 3.8 MMOL/L (ref 3.5–5.3)
POTASSIUM SERPL-SCNC: 3.8 MMOL/L (ref 3.5–5.3)
PROT SERPL-MCNC: 9.1 G/DL (ref 6.4–8.2)
PROT UR STRIP-MCNC: NEGATIVE MG/DL
RBC # BLD AUTO: 5.33 MILLION/UL (ref 3.81–5.12)
RBC #/AREA URNS AUTO: NORMAL /HPF
SAO2 % BLD FROM PO2: 19 % (ref 95–98)
SL AMB POCT GLUCOSE BLD: 502
SODIUM BLD-SCNC: 139 MMOL/L (ref 136–145)
SODIUM SERPL-SCNC: 133 MMOL/L (ref 136–145)
SP GR UR STRIP.AUTO: 1.02 (ref 1–1.03)
SPECIMEN SOURCE: ABNORMAL
UROBILINOGEN UR QL STRIP.AUTO: 0.2 E.U./DL
WBC # BLD AUTO: 7.31 THOUSAND/UL (ref 4.31–10.16)
WBC #/AREA URNS AUTO: NORMAL /HPF

## 2019-01-24 PROCEDURE — 85049 AUTOMATED PLATELET COUNT: CPT | Performed by: INTERNAL MEDICINE

## 2019-01-24 PROCEDURE — 82948 REAGENT STRIP/BLOOD GLUCOSE: CPT

## 2019-01-24 PROCEDURE — 99214 OFFICE O/P EST MOD 30 MIN: CPT | Performed by: NURSE PRACTITIONER

## 2019-01-24 PROCEDURE — 85014 HEMATOCRIT: CPT

## 2019-01-24 PROCEDURE — 83690 ASSAY OF LIPASE: CPT | Performed by: EMERGENCY MEDICINE

## 2019-01-24 PROCEDURE — 82009 KETONE BODYS QUAL: CPT | Performed by: EMERGENCY MEDICINE

## 2019-01-24 PROCEDURE — 80053 COMPREHEN METABOLIC PANEL: CPT | Performed by: EMERGENCY MEDICINE

## 2019-01-24 PROCEDURE — 81025 URINE PREGNANCY TEST: CPT | Performed by: EMERGENCY MEDICINE

## 2019-01-24 PROCEDURE — 99285 EMERGENCY DEPT VISIT HI MDM: CPT

## 2019-01-24 PROCEDURE — 82947 ASSAY GLUCOSE BLOOD QUANT: CPT

## 2019-01-24 PROCEDURE — 1111F DSCHRG MED/CURRENT MED MERGE: CPT | Performed by: NURSE PRACTITIONER

## 2019-01-24 PROCEDURE — 96361 HYDRATE IV INFUSION ADD-ON: CPT

## 2019-01-24 PROCEDURE — 85025 COMPLETE CBC W/AUTO DIFF WBC: CPT | Performed by: EMERGENCY MEDICINE

## 2019-01-24 PROCEDURE — 96374 THER/PROPH/DIAG INJ IV PUSH: CPT

## 2019-01-24 PROCEDURE — 36415 COLL VENOUS BLD VENIPUNCTURE: CPT | Performed by: EMERGENCY MEDICINE

## 2019-01-24 PROCEDURE — 99220 PR INITIAL OBSERVATION CARE/DAY 70 MINUTES: CPT | Performed by: INTERNAL MEDICINE

## 2019-01-24 PROCEDURE — 81001 URINALYSIS AUTO W/SCOPE: CPT

## 2019-01-24 PROCEDURE — 82803 BLOOD GASES ANY COMBINATION: CPT

## 2019-01-24 PROCEDURE — 84295 ASSAY OF SERUM SODIUM: CPT

## 2019-01-24 PROCEDURE — 84132 ASSAY OF SERUM POTASSIUM: CPT

## 2019-01-24 PROCEDURE — 82330 ASSAY OF CALCIUM: CPT

## 2019-01-24 PROCEDURE — 82948 REAGENT STRIP/BLOOD GLUCOSE: CPT | Performed by: NURSE PRACTITIONER

## 2019-01-24 RX ORDER — FLUTICASONE PROPIONATE 50 MCG
1 SPRAY, SUSPENSION (ML) NASAL DAILY
Status: DISCONTINUED | OUTPATIENT
Start: 2019-01-25 | End: 2019-01-26 | Stop reason: HOSPADM

## 2019-01-24 RX ORDER — MONTELUKAST SODIUM 10 MG/1
10 TABLET ORAL EVERY MORNING
Status: DISCONTINUED | OUTPATIENT
Start: 2019-01-25 | End: 2019-01-26 | Stop reason: HOSPADM

## 2019-01-24 RX ORDER — ONDANSETRON 2 MG/ML
4 INJECTION INTRAMUSCULAR; INTRAVENOUS ONCE
Status: COMPLETED | OUTPATIENT
Start: 2019-01-24 | End: 2019-01-24

## 2019-01-24 RX ORDER — ONDANSETRON 2 MG/ML
4 INJECTION INTRAMUSCULAR; INTRAVENOUS EVERY 8 HOURS PRN
Status: DISCONTINUED | OUTPATIENT
Start: 2019-01-24 | End: 2019-01-26 | Stop reason: HOSPADM

## 2019-01-24 RX ORDER — MAGNESIUM HYDROXIDE/ALUMINUM HYDROXICE/SIMETHICONE 120; 1200; 1200 MG/30ML; MG/30ML; MG/30ML
30 SUSPENSION ORAL EVERY 6 HOURS PRN
Status: DISCONTINUED | OUTPATIENT
Start: 2019-01-24 | End: 2019-01-26 | Stop reason: HOSPADM

## 2019-01-24 RX ORDER — DOCUSATE SODIUM 100 MG/1
100 CAPSULE, LIQUID FILLED ORAL 2 TIMES DAILY
Status: DISCONTINUED | OUTPATIENT
Start: 2019-01-25 | End: 2019-01-26 | Stop reason: HOSPADM

## 2019-01-24 RX ORDER — SODIUM CHLORIDE 9 MG/ML
125 INJECTION, SOLUTION INTRAVENOUS CONTINUOUS
Status: DISCONTINUED | OUTPATIENT
Start: 2019-01-24 | End: 2019-01-26

## 2019-01-24 RX ADMIN — ONDANSETRON 4 MG: 2 INJECTION INTRAMUSCULAR; INTRAVENOUS at 18:08

## 2019-01-24 RX ADMIN — SODIUM CHLORIDE 1000 ML: 0.9 INJECTION, SOLUTION INTRAVENOUS at 18:06

## 2019-01-24 RX ADMIN — SODIUM CHLORIDE 125 ML/HR: 0.9 INJECTION, SOLUTION INTRAVENOUS at 23:36

## 2019-01-24 NOTE — ED NOTES
PT NOTES SHE HAS BEEN CHECKING BS AT HOME  SHE NOTES FASTING BS ARE AROUND 300-400     Shawanda Woo RN  01/24/19 0277

## 2019-01-24 NOTE — ED ATTENDING ATTESTATION
I, Mandy Jarrett DO, saw and evaluated the patient  I have discussed the patient with the resident/non-physician practitioner and agree with the resident's/non-physician practitioner's findings, Plan of Care, and MDM as documented in the resident's/non-physician practitioner's note, except where noted  All available labs and Radiology studies were reviewed  At this point I agree with the current assessment done in the Emergency Department  I have conducted an independent evaluation of this patient a history and physical is as follows:      Critical Care Time  CritCare Time    Procedures     23 yr fem with incr sugar and being followed since November Am sugars have been 400  No meds  Incr in thirst, urination, weakness over the past couple of weeks  Doc's trying to decide if type I or II befor they tx  Exm: oral dry  Heart: rrrr abd: soft with mild upper quad tender wo guarding or rebound  Pln: lab, fluids, endocrine consult

## 2019-01-24 NOTE — LETTER
[address]  [address] [facility] [address]  Dept: [phone]    January 26, 2019     Patient: Papa Mcnamara   YOB: 1995   Date of Visit: 1/24/2019       To Whom it May Concern:    Papa Mcnamara is under my professional care  She was seen in the hospital from 1/24/2019   to 01/26/19  She may return to work on 1/29/19  If you have any questions or concerns, please don't hesitate to call  Our office number is [phone]           Sincerely,          Janey Serna PA-C

## 2019-01-24 NOTE — PROGRESS NOTES
Assessment/Plan:    Diabetes mellitus, new onset ([facility] )  Lab Results   Component Value Date    HGBA1C 8 7 (H) 01/17/2019       Recent Labs      01/24/19   1544   POCGLU  352*       Blood Sugar Average: Last 72 hrs:   we had a lengthy discussion in the office today regarding her diagnosis  He did explain to her that her clinical presentation does appear to be more consistent with type 1 diabetes however this is not yet been confirmed by her testing  Because the type of diabetes has not yet been confirmed we discussed starting her on insulin with referral to Endocrinology with help establishing a firm diagnosis  After further discussion in determining how high her blood sugar is with ketone urea, in combination with for her physical symptoms a decision was made to have the patient go to the hospital for management and stabilization of her blood sugar  Patient verbalizes understanding  I did also give her an order for a glucometer and test strips  She will return for follow-up in 2 weeks or after hospitalization if necessary  Diagnoses and all orders for this visit:    Diabetes mellitus, new onset ([facility] )  -     Ambulatory referral to Endocrinology; Future  -     POCT blood glucose  -     Glucometer  -     Glucometer test strips  -     Transfer to other facility          Subjective:      Patient ID: Brendan Bojorquez is a 26 y o  female  Here today for 1 week follow-up  She was seen in the office last week to review her blood work  She is a newly diagnosed diabetic  Her diagnosis seems to favor possible diagnosis of type 1 diabetes given her recent weight loss and continually increasing blood sugar despite dietary carb restriction  Patient was sent last week for additional testing of a C-peptide which came back 1 7  Her urine was positive for ketones and glucose  She reports today that her blood sugar at home has been running in the mid to upper 200 and she continues to have ketones in her urine  Patient reports that her p o  intake has been poor, she feels very fatigued, extremely thirsty, and urinating frequently  She has generalized weakness as well and feels lightheaded and nauseous  No complaints of constipation or diarrhea but she has had an increase in bowel frequency  The following portions of the patient's history were reviewed and updated as appropriate: allergies, current medications, past family history, past medical history, past social history, past surgical history and problem list     Review of Systems   Constitutional: Positive for activity change, appetite change, fatigue and unexpected weight change  Negative for chills and fever  Eyes: Negative for visual disturbance  Respiratory: Negative for cough, chest tightness and shortness of breath  Cardiovascular: Negative for chest pain, palpitations and leg swelling  Gastrointestinal: Negative for constipation, diarrhea, nausea and vomiting  Endocrine: Positive for polydipsia and polyuria  Negative for polyphagia  Genitourinary: Negative for dysuria and frequency  Musculoskeletal: Negative for arthralgias and myalgias  Allergic/Immunologic: Negative for environmental allergies  Neurological: Positive for weakness  Negative for dizziness, numbness and headaches  Psychiatric/Behavioral: Negative for sleep disturbance  The patient is nervous/anxious  Objective:      /64 (BP Location: Left arm, Patient Position: Sitting, Cuff Size: Standard)   Pulse 78   Temp 98 3 °F (36 8 °C)   Resp 16   Ht 5' 2" (1 575 m)   Wt 50 8 kg (112 lb)   LMP 01/12/2019 (Approximate)   SpO2 98%   BMI 20 49 kg/m²          Physical Exam   Constitutional: She is oriented to person, place, and time  Vital signs are normal  She appears well-developed and well-nourished  She is cooperative     HENT:   Mouth/Throat: Uvula is midline, oropharynx is clear and moist and mucous membranes are normal    Eyes: Pupils are equal, round, and reactive to light  Conjunctivae and lids are normal    Neck: No JVD present  Carotid bruit is not present  Cardiovascular: Normal rate, regular rhythm, normal heart sounds and intact distal pulses  No murmur heard  Pulmonary/Chest: Effort normal and breath sounds normal  No respiratory distress  Abdominal: Soft  Normal appearance and bowel sounds are normal  There is no hepatosplenomegaly  There is tenderness in the right upper quadrant and right lower quadrant  Musculoskeletal: Normal range of motion  She exhibits no edema  Lymphadenopathy:     She has no cervical adenopathy  Neurological: She is alert and oriented to person, place, and time  Skin: Skin is warm, dry and intact  Psychiatric: Her speech is normal and behavior is normal  Judgment and thought content normal  Her mood appears anxious  Cognition and memory are normal    Vitals reviewed

## 2019-01-24 NOTE — ASSESSMENT & PLAN NOTE
Lab Results   Component Value Date    HGBA1C 8 7 (H) 01/17/2019       Recent Labs      01/24/19   1544   POCGLU  352*       Blood Sugar Average: Last 72 hrs:   we had a lengthy discussion in the office today regarding her diagnosis  He did explain to her that her clinical presentation does appear to be more consistent with type 1 diabetes however this is not yet been confirmed by her testing  Because the type of diabetes has not yet been confirmed we discussed starting her on insulin with referral to Endocrinology with help establishing a firm diagnosis  After further discussion in determining how high her blood sugar is with ketone urea, in combination with for her physical symptoms a decision was made to have the patient go to the hospital for management and stabilization of her blood sugar  Patient verbalizes understanding  I did also give her an order for a glucometer and test strips  She will return for follow-up in 2 weeks or after hospitalization if necessary

## 2019-01-25 PROBLEM — R71.0 DROP IN HEMOGLOBIN: Status: ACTIVE | Noted: 2019-01-25

## 2019-01-25 PROBLEM — E87.6 HYPOKALEMIA: Status: ACTIVE | Noted: 2019-01-25

## 2019-01-25 LAB
ANION GAP SERPL CALCULATED.3IONS-SCNC: 9 MMOL/L (ref 4–13)
BASOPHILS # BLD AUTO: 0.02 THOUSANDS/ΜL (ref 0–0.1)
BASOPHILS NFR BLD AUTO: 0 % (ref 0–1)
BUN SERPL-MCNC: 14 MG/DL (ref 5–25)
CALCIUM SERPL-MCNC: 8.1 MG/DL (ref 8.3–10.1)
CHLORIDE SERPL-SCNC: 107 MMOL/L (ref 100–108)
CO2 SERPL-SCNC: 22 MMOL/L (ref 21–32)
CREAT SERPL-MCNC: 0.82 MG/DL (ref 0.6–1.3)
EOSINOPHIL # BLD AUTO: 0.12 THOUSAND/ΜL (ref 0–0.61)
EOSINOPHIL NFR BLD AUTO: 2 % (ref 0–6)
ERYTHROCYTE [DISTWIDTH] IN BLOOD BY AUTOMATED COUNT: 12.1 % (ref 11.6–15.1)
GFR SERPL CREATININE-BSD FRML MDRD: 103 ML/MIN/1.73SQ M
GLUCOSE SERPL-MCNC: 108 MG/DL (ref 65–140)
GLUCOSE SERPL-MCNC: 110 MG/DL (ref 65–140)
GLUCOSE SERPL-MCNC: 120 MG/DL (ref 65–140)
GLUCOSE SERPL-MCNC: 137 MG/DL (ref 65–140)
GLUCOSE SERPL-MCNC: 159 MG/DL (ref 65–140)
GLUCOSE SERPL-MCNC: 163 MG/DL (ref 65–140)
GLUCOSE SERPL-MCNC: 172 MG/DL (ref 65–140)
GLUCOSE SERPL-MCNC: 230 MG/DL (ref 65–140)
GLUCOSE SERPL-MCNC: 248 MG/DL (ref 65–140)
GLUCOSE SERPL-MCNC: 257 MG/DL (ref 65–140)
GLUCOSE SERPL-MCNC: 302 MG/DL (ref 65–140)
GLUCOSE SERPL-MCNC: 313 MG/DL (ref 65–140)
GLUCOSE SERPL-MCNC: 48 MG/DL (ref 65–140)
GLUCOSE SERPL-MCNC: 98 MG/DL (ref 65–140)
GLUCOSE SERPL-MCNC: 99 MG/DL (ref 65–140)
HCT VFR BLD AUTO: 36 % (ref 34.8–46.1)
HGB BLD-MCNC: 12.5 G/DL (ref 11.5–15.4)
IMM GRANULOCYTES # BLD AUTO: 0.03 THOUSAND/UL (ref 0–0.2)
IMM GRANULOCYTES NFR BLD AUTO: 0 % (ref 0–2)
LYMPHOCYTES # BLD AUTO: 3.57 THOUSANDS/ΜL (ref 0.6–4.47)
LYMPHOCYTES NFR BLD AUTO: 48 % (ref 14–44)
MAGNESIUM SERPL-MCNC: 2 MG/DL (ref 1.6–2.6)
MCH RBC QN AUTO: 29.3 PG (ref 26.8–34.3)
MCHC RBC AUTO-ENTMCNC: 34.7 G/DL (ref 31.4–37.4)
MCV RBC AUTO: 85 FL (ref 82–98)
MONOCYTES # BLD AUTO: 0.63 THOUSAND/ΜL (ref 0.17–1.22)
MONOCYTES NFR BLD AUTO: 8 % (ref 4–12)
NEUTROPHILS # BLD AUTO: 3.23 THOUSANDS/ΜL (ref 1.85–7.62)
NEUTS SEG NFR BLD AUTO: 42 % (ref 43–75)
NRBC BLD AUTO-RTO: 0 /100 WBCS
PHOSPHATE SERPL-MCNC: 3.6 MG/DL (ref 2.7–4.5)
PLATELET # BLD AUTO: 148 THOUSANDS/UL (ref 149–390)
PMV BLD AUTO: 13 FL (ref 8.9–12.7)
POTASSIUM SERPL-SCNC: 3.1 MMOL/L (ref 3.5–5.3)
RBC # BLD AUTO: 4.26 MILLION/UL (ref 3.81–5.12)
SODIUM SERPL-SCNC: 138 MMOL/L (ref 136–145)
WBC # BLD AUTO: 7.6 THOUSAND/UL (ref 4.31–10.16)

## 2019-01-25 PROCEDURE — 80048 BASIC METABOLIC PNL TOTAL CA: CPT | Performed by: INTERNAL MEDICINE

## 2019-01-25 PROCEDURE — 85025 COMPLETE CBC W/AUTO DIFF WBC: CPT | Performed by: INTERNAL MEDICINE

## 2019-01-25 PROCEDURE — 82948 REAGENT STRIP/BLOOD GLUCOSE: CPT

## 2019-01-25 PROCEDURE — 99254 IP/OBS CNSLTJ NEW/EST MOD 60: CPT | Performed by: INTERNAL MEDICINE

## 2019-01-25 PROCEDURE — 84100 ASSAY OF PHOSPHORUS: CPT | Performed by: INTERNAL MEDICINE

## 2019-01-25 PROCEDURE — 99232 SBSQ HOSP IP/OBS MODERATE 35: CPT | Performed by: PHYSICIAN ASSISTANT

## 2019-01-25 PROCEDURE — 83735 ASSAY OF MAGNESIUM: CPT | Performed by: INTERNAL MEDICINE

## 2019-01-25 RX ORDER — POTASSIUM CHLORIDE 20 MEQ/1
40 TABLET, EXTENDED RELEASE ORAL ONCE
Status: COMPLETED | OUTPATIENT
Start: 2019-01-25 | End: 2019-01-25

## 2019-01-25 RX ORDER — ACETAMINOPHEN 325 MG/1
650 TABLET ORAL EVERY 6 HOURS PRN
Status: DISCONTINUED | OUTPATIENT
Start: 2019-01-25 | End: 2019-01-26 | Stop reason: HOSPADM

## 2019-01-25 RX ORDER — INSULIN GLARGINE 100 [IU]/ML
10 INJECTION, SOLUTION SUBCUTANEOUS
Status: DISCONTINUED | OUTPATIENT
Start: 2019-01-25 | End: 2019-01-26 | Stop reason: HOSPADM

## 2019-01-25 RX ORDER — LEVONORGESTREL AND ETHINYL ESTRADIOL 0.15-0.03
1 KIT ORAL DAILY
Status: DISCONTINUED | OUTPATIENT
Start: 2019-01-25 | End: 2019-01-26 | Stop reason: HOSPADM

## 2019-01-25 RX ADMIN — LEVONORGESTREL AND ETHINYL ESTRADIOL 1 TABLET: KIT at 21:27

## 2019-01-25 RX ADMIN — ACETAMINOPHEN 650 MG: 325 TABLET, FILM COATED ORAL at 19:17

## 2019-01-25 RX ADMIN — INSULIN GLARGINE 10 UNITS: 100 INJECTION, SOLUTION SUBCUTANEOUS at 21:44

## 2019-01-25 RX ADMIN — ACETAMINOPHEN 650 MG: 325 TABLET, FILM COATED ORAL at 09:56

## 2019-01-25 RX ADMIN — MONTELUKAST SODIUM 10 MG: 10 TABLET, FILM COATED ORAL at 08:24

## 2019-01-25 RX ADMIN — SODIUM CHLORIDE 0.3 UNITS/HR: 9 INJECTION, SOLUTION INTRAVENOUS at 16:52

## 2019-01-25 RX ADMIN — SODIUM CHLORIDE 125 ML/HR: 0.9 INJECTION, SOLUTION INTRAVENOUS at 17:49

## 2019-01-25 RX ADMIN — POTASSIUM CHLORIDE 40 MEQ: 1500 TABLET, EXTENDED RELEASE ORAL at 17:41

## 2019-01-25 RX ADMIN — SODIUM CHLORIDE 4 UNITS/HR: 9 INJECTION, SOLUTION INTRAVENOUS at 01:06

## 2019-01-25 NOTE — NURSING NOTE
Patient states she is not feeling well but can't explain what it is  Blood sugar checked and is 48  Orange juice and gela crackers given to patient  [place], [place] with [facility] made aware and wants to see how patient recovers on her own  Will recheck sugar after snack  Will continue to monitor

## 2019-01-25 NOTE — ASSESSMENT & PLAN NOTE
Lab Results   Component Value Date    HGBA1C 8 7 (H) 01/17/2019     Recent Labs      01/25/19   0815  01/25/19   0958  01/25/19   1113  01/25/19   1237   POCGLU  230*  248*  48*  313*     Blood Sugar Average: Last 72 hrs:  (P) 201     · Diabetic with ketosis  · Continue with IV insulin non critical care protocol  · Monitor electrolytes closely and replete  · IV fluids  · Monitor Accu-Cheks closely  · Patient became hypoglycemic so insulin drip was turned off  Patient was symptomatic  She was given a snack and orange juice and her blood sugars increased  Her insulin drip was turned back on  · Endocrinology consult pending

## 2019-01-25 NOTE — PROGRESS NOTES
Progress Note - Brendan Power 1995, 26 y o  female MRN: [id number]  Unit/Bed#: CW2 217-05 Encounter: [id number]  Primary Care Provider: MARGARET Ziegler   Date and time admitted to hospital: 1/24/2019  5:39 PM    * Diabetes mellitus, new onset [facility])   Assessment & Plan    Lab Results   Component Value Date    HGBA1C 8 7 (H) 01/17/2019     Recent Labs      01/25/19   0815  01/25/19   0958  01/25/19   1113  01/25/19   1237   POCGLU  230*  248*  48*  313*     Blood Sugar Average: Last 72 hrs:  (P) 201     · Diabetic with ketosis  · Continue with IV insulin non critical care protocol  · Monitor electrolytes closely and replete  · IV fluids  · Monitor Accu-Cheks closely  · Patient became hypoglycemic so insulin drip was turned off  Patient was symptomatic  She was given a snack and orange juice and her blood sugars increased  Her insulin drip was turned back on  · Endocrinology consult pending  Drop in hemoglobin   Assessment & Plan    · Hemoglobin on admission:  15 6  · Hemoglobin today:  12 5  · Suspect hemodilution secondary to IV fluids  No evidence of active bleeding  Hypokalemia   Assessment & Plan    · Replete and monitor       VTE Pharmacologic Prophylaxis:   Pharmacologic: Enoxaparin (Lovenox)  Mechanical: Mechanical VTE prophylaxis in place  Patient Centered Rounds: I have performed bedside rounds with nursing staff today  Discussions with Specialists or Other Care Team Provider: None  Education and Discussions with Family / Patient: All patient questions answered to the best of my ability  Time Spent for Care: 20 minutes  More than 50% of total time spent on counseling and coordination of care as described above  Current Length of Stay: 0 day(s)  Current Patient Status: Observation   Certification Statement: The patient, admitted on an observation basis, will now require > 2 midnight hospital stay due to IV insulin  Will change to in-patient      Discharge Plan: Patient is not medically stable for discharge  She will require a few more days in the hospital for sugar management  She will need all insulin supplies and medication at discharge  Code Status: Level 1 - Full Code    Subjective:   Patient is doing well overall but would like to be in a private room  She is very anxious and did not sleep well last night  She developed an episode of hypoglycemia earlier and became jittery and nauseous  She was found have a blood sugar 48 and her insulin drip was turned off  She was given a snack and some juice and her sugars jumped into the 300s  Her insulin was turned back on  Objective:   Vitals:   Temp (24hrs), Av 2 °F (36 8 °C), Min:97 4 °F (36 3 °C), Max:98 7 °F (37 1 °C)    Temp:  [97 4 °F (36 3 °C)-98 7 °F (37 1 °C)] 98 6 °F (37 °C)  HR:  [73-89] 83  Resp:  [14-18] 18  BP: ()/() 99/59  SpO2:  [96 %-99 %] 99 %  Body mass index is 20 16 kg/m²  Input and Output Summary (last 24 hours): Intake/Output Summary (Last 24 hours) at 19 1424  Last data filed at 19 1415   Gross per 24 hour   Intake             1660 ml   Output                0 ml   Net             1660 ml       Physical Exam:     Physical Exam   HENT:   Head: Normocephalic and atraumatic  Mouth/Throat: Oropharynx is clear and moist and mucous membranes are normal    Eyes: No scleral icterus  Cardiovascular: Normal rate and regular rhythm  No murmur heard  Pulmonary/Chest: Breath sounds normal  She has no wheezes  She has no rales  She exhibits no tenderness  Abdominal: Soft  Bowel sounds are normal  She exhibits no distension  There is no tenderness  Musculoskeletal: Normal range of motion  She exhibits no edema  Skin: Skin is warm and dry  No rash noted  Psychiatric: She has a normal mood and affect  Vitals reviewed      Additional Data:   Labs:    Results from last 7 days  Lab Units 19  0519   WBC Thousand/uL 7 60   HEMOGLOBIN g/dL 12 5   HEMATOCRIT % 36 0 PLATELETS Thousands/uL 148*   NEUTROS PCT % 42*   LYMPHS PCT % 48*   MONOS PCT % 8   EOS PCT % 2       Results from last 7 days  Lab Units 01/25/19  0519 01/24/19  1817 01/24/19  1804   POTASSIUM mmol/L 3 1*  --  3 8   CHLORIDE mmol/L 107  --  100   CO2 mmol/L 22  --  23   CO2, I-STAT mmol/L  --  26  --    BUN mg/dL 14  --  11   CREATININE mg/dL 0 82  --  0 86   CALCIUM mg/dL 8 1*  --  9 7   ALK PHOS U/L  --   --  114   ALT U/L  --   --  21   AST U/L  --   --  11   GLUCOSE, ISTAT mg/dl  --  342*  --            * I Have Reviewed All Lab Data Listed Above  * Additional Pertinent Lab Tests Reviewed:  All Labs Within Last 24 Hours Reviewed    Imaging:    Imaging Reports Reviewed Today Include:  None new    Cultures:   Blood Culture: No results found for: BLOODCX  Urine Culture:   Lab Results   Component Value Date    URINECX No Growth <1000 cfu/mL 04/25/2016     Sputum Culture: No components found for: SPUTUMCX  Wound Culture: No results found for: WOUNDCULT    Last 24 Hours Medication List:     Current Facility-Administered Medications:  acetaminophen 650 mg Oral Q6H PRN Justa Oleary PA-C    aluminum-magnesium hydroxide-simethicone 30 mL Oral Q6H PRN Machelle Muñoz MD    docusate sodium 100 mg Oral BID Machelle Muñoz MD    enoxaparin 40 mg Subcutaneous Daily Machelle Muñoz MD    fluticasone 1 spray Nasal Daily Machelle Muñoz MD    insulin regular (HumuLIN R,NovoLIN R) infusion 0 3-21 Units/hr Intravenous Titrated Machelle Muñoz MD Last Rate: 6 Units/hr (01/25/19 1246)   montelukast 10 mg Oral QAM Machelle Muñoz MD    norgestrel-ethinyl estradiol 1 tablet Oral Daily Machelle Muñoz MD    ondansetron 4 mg Intravenous Q8H PRN Machelle Muñoz MD    psyllium 1 packet Oral Daily Machelle Muñoz MD    sodium chloride 125 mL/hr Intravenous Continuous Machelle Muñoz MD Last Rate: 125 mL/hr (01/24/19 2336)        Today, Patient Was Seen By: Justa Oleary, SANDRA    ** Please Note: Dragon 360 Dictation voice to text software may have been used in the creation of this document   **

## 2019-01-25 NOTE — ASSESSMENT & PLAN NOTE
· Hemoglobin on admission:  15 6  · Hemoglobin today:  12 5  · Suspect hemodilution secondary to IV fluids  No evidence of active bleeding

## 2019-01-25 NOTE — H&P
H&P- Dilshad Barakat 1995, 26 y o  female MRN: [id number]    Unit/Bed#: ED 16 Encounter: [id number]    Primary Care Provider: MARGARET Pride   Date and time admitted to hospital: 1/24/2019  5:39 PM        * Diabetes mellitus, new onset [facility])   Assessment & Plan    Lab Results   Component Value Date    HGBA1C 8 7 (H) 01/17/2019       Recent Labs      01/24/19   1544   POCGLU  352*       Blood Sugar Average: Last 72 hrs:  (P) 352     Diabetic with ketosis  Will have her on IV insulin non critical care protocol  Monitor electrolytes closely and replete  IV fluids  Monitor Accu-Cheks closely  Avoid hypoglycemia  Endocrinology consult             VTE Prophylaxis: Enoxaparin (Lovenox)  / sequential compression device   Code Status:  Full code  POLST: There is no POLST form on file for this patient (pre-hospital)  Discussion with family:  Discussed with the patient, father at bedside updated    Anticipated Length of Stay:  Patient will be admitted on an Observation basis with an anticipated length of stay of  Less than 2 midnights  Chief Complaint:       Fatigue generalized weakness, polyuria polydipsia, high glucose levels    History of Present Illness:    Dilshad Barakat is a 26 y o  female who presents with generalized weakness, fatigue, polyuria, polydipsia, high glucose levels  Patient reports these symptoms going on for the last month or so, her symptoms of hyperglycemia are worsened last week or so  She also reports weight loss  She reports nausea    On inquiry she reports abdominal pain on and off epigastric, she also reports loose stools suggestive of steatorrhea  No history of fever chills sweats constitutional symptoms  Denies dysuria  Denies vomiting, diarrhea  No history of hematochezia hematemesis melena  Denies chest pain shortness of breath palpitations presyncope syncope  Review of Systems:    Review of Systems   All other systems reviewed and are negative        Past Medical and Surgical History:     Past Medical History:   Diagnosis Date    Anxiety     Anxiety and depression     Depression     Diabetes mellitus ([facility] ) 1/17/2019    Eating disorder     history of anorexia/bulemia [id number]    Fracture of fibula     R Jasoner I       Past Surgical History:   Procedure Laterality Date    WISDOM TOOTH EXTRACTION      WRIST SURGERY      left; Excision of ganglion       Meds/Allergies:    Prior to Admission medications    Medication Sig Start Date End Date Taking? Authorizing Provider   Lex Areola 0 15-30 MG-MCG per tablet  9/21/18   Historical Provider, MD   calcium polycarbophil (FIBERCON) 625 mg tablet Take 1 tablet (625 mg total) by mouth daily 1/17/19   MARGARET Faustin   fluticasone (FLONASE) 50 mcg/act nasal spray 1 spray into each nostril daily    Historical Provider, MD   meloxicam (MOBIC) 15 mg tablet Take 1 tablet (15 mg total) by mouth daily 9/26/18   Mirza Carlos MD   montelukast (SINGULAIR) 10 mg tablet Take 10 mg by mouth every morning 1/9/19   Historical Provider, MD RODRIGUES have reviewed home medications with patient personally  Allergies: Allergies   Allergen Reactions    Iodides Throat Swelling     Reaction Date: 28Jul2014; Action Taken: none; Category:  Allergy;     Iodine        Social History:     Marital Status: Single   Occupation:  Student  Patient Pre-hospital Living Situation: home  Patient Pre-hospital Level of Mobility:  Independent  Patient Pre-hospital Diet Restrictions: no  Substance Use History:   History   Alcohol Use No     History   Smoking Status    Never Smoker   Smokeless Tobacco    Never Used     Comment: Tobacco smoke exposure (Father smokes cigars)     History   Drug Use No       Family History:    Family History   Problem Relation Age of Onset    Hypertension Mother    Earna Carola Migraines Mother         Headache    Diabetes type II Mother     Varicose Veins Mother     Hyperlipidemia Mother     Diabetes Mother     Arthritis Mother     Depression Mother     Cholelithiasis Father     Hypertension Father     Sarcoidosis Father         Liver    Hyperlipidemia Father     Diabetes Father     Coronary artery disease Father     Nephrolithiasis Father     Cirrhosis Father     Alcohol abuse Father     Thyroid disease Sister     Cancer Family     Diabetes Family     Hypertension Family        Physical Exam:     Vitals:   Blood Pressure: 114/66 (01/24/19 1945)  Pulse: 78 (01/24/19 1945)  Temperature: (!) 97 4 °F (36 3 °C) (01/24/19 1543)  Temp Source: Tympanic (01/24/19 1543)  Respirations: 18 (01/24/19 1945)  Height: 5' 2" (157 5 cm) (01/24/19 1945)  Weight - Scale: 50 8 kg (112 lb) (01/24/19 1945)  SpO2: 97 % (01/24/19 1945)    Physical Exam     Comfortably sitting up in bed  Neck supple  Mucous members are moist  Lungs clear to auscultation  Heart sounds S1 and S2 noted  Abdomen soft nontender  Awake alert obeys simple commands  Pulses noted  No pedal edema  No rash    Additional Data:     Lab Results: I have personally reviewed pertinent reports          Results from last 7 days  Lab Units 01/24/19 1817 01/24/19  1804   WBC Thousand/uL  --  7 31   HEMOGLOBIN g/dL  --  15 6*   I STAT HEMOGLOBIN g/dl 16 0*  --    HEMATOCRIT %  --  44 4   HEMATOCRIT, ISTAT % 47*  --    PLATELETS Thousands/uL  --  178   NEUTROS PCT %  --  40*   LYMPHS PCT %  --  51*   MONOS PCT %  --  7   EOS PCT %  --  2       Results from last 7 days  Lab Units 01/24/19 1817 01/24/19  1804   SODIUM mmol/L  --  133*   POTASSIUM mmol/L  --  3 8   CHLORIDE mmol/L  --  100   CO2 mmol/L  --  23   CO2, I-STAT mmol/L 26  --    BUN mg/dL  --  11   CREATININE mg/dL  --  0 86   ANION GAP mmol/L  --  10   CALCIUM mg/dL  --  9 7   ALBUMIN g/dL  --  4 7   TOTAL BILIRUBIN mg/dL  --  1 08*   ALK PHOS U/L  --  114   ALT U/L  --  21   AST U/L  --  11   GLUCOSE RANDOM mg/dL  --  340*           Results from last 7 days  Lab Units 01/24/19  1544   POC GLUCOSE mg/dl 352* Imaging: I have personally reviewed pertinent reports  No orders to display       EKG, Pathology, and Other Studies Reviewed on Admission:   · EKG:  Normal sinus rhythm on telemetry    Allscripts / Epic Records Reviewed: Yes     ** Please Note: This note has been constructed using a voice recognition system   **

## 2019-01-25 NOTE — ASSESSMENT & PLAN NOTE
Lab Results   Component Value Date    HGBA1C 8 7 (H) 01/17/2019       Recent Labs      01/24/19   1544   POCGLU  352*       Blood Sugar Average: Last 72 hrs:  (P) 352     Diabetic with ketosis  Will have her on IV insulin non critical care protocol  Monitor electrolytes closely and replete  IV fluids  Monitor Accu-Cheks closely  Avoid hypoglycemia  Endocrinology consult

## 2019-01-25 NOTE — UTILIZATION REVIEW
Initial Clinical Review    Admission: Date/Time/Statement: Observation 1/24 and changed to Inpatient on 1428  Pt still requiring Diabetes management on Insulin Infusion    Admitting Physician Annetta Cuadra    Level of Care Med Surg    Estimated length of stay More than 2 Midnights    Certification I certify that inpatient services are medically necessary for this patient for a duration of greater than two midnights  See H&P and MD Progress Notes for additional information about the patient's course of treatment  ED: Date/Time/Mode of Arrival:   ED Arrival Information     Expected Arrival Acuity Means of Arrival Escorted By Service Admission Type    1/24/2019 1/24/2019 15:29 Emergent Walk-In Friend Hospitalist Emergency    Arrival Complaint    Diabetes mellitus, new onset [facility])        Chief Complaint:   Chief Complaint   Patient presents with    Hyperglycemia - Symptomatic     patient states "my sugar was over 500 today, I am being tested for diabetes and they don't know if I have type 1 or type 2, the test came back inconclusive"     History of Illness: 25 y/o female presents to the ED for hyperglycemia  Patient reports that she has been following with her pcp since nov  She states that she has had fatigue, generalized weakness, and polyuria/ polydipsia x 1 month- worse over the last week  She is not currently on any meds  States that her morning blood sugars have been in the 400s      ED Vital Signs:   ED Triage Vitals [01/24/19 1543]   Temperature Pulse Respirations Blood Pressure SpO2   (!) 97 4 °F (36 3 °C) 89 16 128/84 97 %      Temp Source Heart Rate Source Patient Position - Orthostatic VS BP Location FiO2 (%)   Tympanic Monitor Sitting Left arm --      Pain Score       No Pain        Wt Readings from Last 1 Encounters:   01/25/19 50 kg (110 lb 3 7 oz)     Vital Signs (abnormal):     Pertinent Labs/Diagnostic Test Results:   Na = 133  Glucose = 352, 340, 257  K = 3 1  Acetone, Bld = 1+    Glucose, UA 500 (1/2%)     Ketones, UA 80 (3+)         ED Treatment:   Medication Administration from 01/24/2019 1404 to 01/25/2019 0026       Date/Time Order Dose Route Action     01/24/2019 1806 sodium chloride 0 9 % bolus 1,000 mL 1,000 mL Intravenous New Bag     01/24/2019 1808 ondansetron (ZOFRAN) injection 4 mg 4 mg Intravenous Given     01/24/2019 2336 sodium chloride 0 9 % infusion 125 mL/hr Intravenous New Bag        Past Medical/Surgical History: Active Ambulatory Problems     Diagnosis Date Noted    Encounter for gynecological examination (general) (routine) without abnormal findings 03/28/2018    Patellofemoral pain syndrome of right knee 09/26/2018    Depression 12/13/2018    History of prediabetes 12/13/2018    Screening for heart disease 12/13/2018    Abnormal liver function test 12/13/2018    Chronic RUQ pain 12/13/2018    Chronic fatigue and malaise 12/13/2018    [facility] mellitus ([facility] ) 01/17/2019    Irregular bowel habits 01/17/2019    Anxiety and depression 01/17/2019    [facility] 01/24/2019     Resolved Ambulatory Problems     Diagnosis Date Noted    No Resolved Ambulatory Problems     Past Medical History:   Diagnosis Date    Anxiety     Anxiety and depression     Depression     Diabetes mellitus ([facility] ) 1/17/2019    Eating disorder     Fracture of fibula      Admitting Diagnosis: Diabetes mellitus ([facility] ) [E11 9]  Ketonuria [R82 4]  Acetonemia [R79 89]  Hyperglycemia [R73 9]  Diabetes mellitus, new onset ([facility] ) [E11 9]     Age/Sex: 26 y o  female     Assessment/Plan:   23y Female to ED with Fatigue generalized weakness, polyuria polydipsia, high glucose levels  Patient reports these symptoms going on for the last month or so, her symptoms of hyperglycemia are worsened last week or so  She also reports weight loss  She reports nausea         Pt is being admitted with Diabetes Mellitus, New Onset  Diabetic with ketosis  Will have her on IV insulin non critical care protocol  Monitor electrolytes closely and replete  IV fluids  Monitor Accu-Cheks closely  Avoid hypoglycemia  Endocrinology consult       Admission Orders:  Endocrinology consult    Scheduled Meds:   Current Facility-Administered Medications:  docusate sodium 100 mg Oral BID   enoxaparin 40 mg Subcutaneous Daily   fluticasone 1 spray Nasal Daily   montelukast 10 mg Oral QAM   norgestrel-ethinyl estradiol 1 tablet Oral Daily   psyllium 1 packet Oral Daily     Continuous Infusions:   insulin regular (HumuLIN R,NovoLIN R) infusion 0 3-21 Units/hr Last Rate: 6 Units/hr (01/25/19 0959)   sodium chloride 125 mL/hr Last Rate: 125 mL/hr (01/24/19 2336)     PRN Meds:     Acetaminophen po x1    aluminum-magnesium hydroxide-simethicone    ondansetron    ----------------------------------------------------------------------------------------------------    1/25 Progress notes:  New onset Diabetes  Continue with IV insulin non critical care protocol  Monitor electrolytes closely and replete  IV fluids  Monitor Accu-Cheks closely  Patient became hypoglycemic so insulin drip was turned off  Patient was symptomatic  She was given a snack and orange juice and her blood sugars increased  Her insulin drip was turned back on  Endocrinology consult pending

## 2019-01-25 NOTE — MEDICAL STUDENT
MEDICAL STUDENT  Inpatient H&P for TRAINING ONLY   Not Part of Legal Medical Record       H&P Exam - Chrissie Delgado 26 y o  female MRN: [id number]    Unit/Bed#: CW2 217-05 Encounter: [id number]    Assessment:  Ms Sweta Howard is a 22 yo female with PMH of depression/anxiety, anorexia nervosa and bulimia, h  pylori and recently diagnosed diabetes mellitus who presented to [facility] on 1/24/19 for a symptomatic BG of 352 in her PCP's office in the setting of new onset Type I vs  Type II Diabetes Mellitus  Plan:  1  Hyperglycemia with acetonemia in the setting of newly diagnosed diabetes  - likely type I diabetes over type II: BMI 20 16, very responsive to insulin, c-peptide low-normal at 1 7  - A1c 8 7%, acetone +, lipase and TSH wnl   - received IVF and currently on insulin gtt  - draw antibodies to GAD65, IA2  - BG  overnight, mynor to 230-248 this morning with minimal breakfast   - See how patient responds to lunch, then transition to basal-bolus: 6 units lantus qHS, 3 units humalog TID with meals plus SSI  - monitor for hypoglycemia  - follow-up outpatient with endocrinology       History of Present Illness   Ms Sweta Howard is a 22 yo female with PMH of depression/anxiety, anorexia nervosa and bulimia, h  pylori and recently diagnosed diabetes mellitus who presented to [facility] on 1/24/19 for a symptomatic BG of 502in her PCP's office in the setting of new onset Type I vs  Type II Diabetes Mellitus  She was recently seen by her PCP in December for softer, "greasier", lighter-colored stool, dry, rough skin, hepatomegaly, fatigue and flu-like symptoms every few months for the past two years, and impaired fasting glucose and an A1c of 7 1%  She had a follow-up appointment last week (1/17/19), where she was found to have an A1c of 8 7%, a c-peptide of 1 7, ketonuria, glucosuria and increasing blood sugars despite changing her carb and sugar-heavy diet   Her sugars increased from December to January, going up into the 300s  She has lost weight and has increasing polydipsia and polyuria  She had another follow-up appointment yesterday (1/24/15), during which her BG was 502, prompting her PCP to send her to the hospital for stabilization of her blood sugar  In the ED, she was acetone +, her glucose was 352, and her urine was positive for ketones and gluocse  She was given a bolus of NS and then was started on IVF 125ml/hr and an insulin drip  From 0100 until 0930, she required 12 5 units of insulin  Her blood sugars responded very well to insulin and dropped overnight to   They mynor to 230 and then 248 at 0815 after breakfast      Ms Michaela Dowd is currently feeling better, though still tired  She was not very hungry this morning and ate just a little bit of cereal, but she is very hungry now  She has many questions about diabetes and how it will change her lifestyle      Historical Information   Past Medical History:   Diagnosis Date    Anxiety     Anxiety and depression     Depression     [facility] 1/17/2019    Eating disorder     history of anorexia/bulemia [id number]    Fracture of fibula     R Salter I     Past Surgical History:   Procedure Laterality Date    WISDOM TOOTH EXTRACTION      WRIST SURGERY      left;  Excision of ganglion     Social History   History   Alcohol Use No     History   Drug Use No     History   Smoking Status    Never Smoker   Smokeless Tobacco    Never Used     Comment: Tobacco smoke exposure (Father smokes cigars)     Family History:   Family History   Problem Relation Age of Onset    Hypertension Mother     Migraines Mother         Headache    Diabetes type II Mother     Varicose Veins Mother     Hyperlipidemia Mother     Diabetes Mother     Arthritis Mother     Depression Mother     Cholelithiasis Father     Hypertension Father     Sarcoidosis Father         Liver    Hyperlipidemia Father     Diabetes Father     Coronary artery disease Father     Nephrolithiasis Father     Cirrhosis Father     Alcohol abuse Father     Thyroid disease Sister     Cancer Family     Diabetes Family     Hypertension Family        Meds/Allergies   PTA meds:   Prior to Admission Medications   Prescriptions Last Dose Informant Patient Reported? Taking? ALTAVERA 0 15-30 MG-MCG per tablet 2019 at Unknown time  Yes Yes   calcium polycarbophil (FIBERCON) 625 mg tablet Not Taking at Unknown time  No No   Sig: Take 1 tablet (625 mg total) by mouth daily   Patient not taking: Reported on 2019    fluticasone (FLONASE) 50 mcg/act nasal spray 2019 at Unknown time  Yes Yes   Si spray into each nostril daily   meloxicam (MOBIC) 15 mg tablet   No No   Sig: Take 1 tablet (15 mg total) by mouth daily   montelukast (SINGULAIR) 10 mg tablet 2019 at Unknown time  Yes Yes   Sig: Take 10 mg by mouth every morning      Facility-Administered Medications: None     Allergies   Allergen Reactions    Iodides Throat Swelling     Reaction Date: 2014; Action Taken: none; Category:  Allergy;     Iodine        Objective   First Vitals:   Blood Pressure: 128/84 (19 1543)  Pulse: 89 (19 1543)  Temperature: (!) 97 4 °F (36 3 °C) (19 1543)  Temp Source: Tympanic (19 1543)  Respirations: 16 (19 1543)  Height: 5' 2" (157 5 cm) (19)  Weight - Scale: 50 8 kg (112 lb) (19)  SpO2: 97 % (19 1543)    Current Vitals:   Blood Pressure: 99/59 (19 0700)  Pulse: 83 (19 0700)  Temperature: 98 6 °F (37 °C) (19 0300)  Temp Source: Oral (19 0300)  Respirations: 18 (19 07)  Height: 5' 2" (157 5 cm) (19)  Weight - Scale: 50 kg (110 lb 3 7 oz) (19)  SpO2: 99 % (19 0700)      Intake/Output Summary (Last 24 hours) at 19 0915  Last data filed at 19 0859   Gross per 24 hour   Intake             1240 ml   Output                0 ml   Net             1240 ml       Invasive Devices     Peripheral Intravenous Line            Peripheral IV 01/24/19 Left Antecubital less than 1 day              Lab Results:   Lab Results   Component Value Date    WBC 7 60 01/25/2019    HGB 12 5 01/25/2019    HCT 36 0 01/25/2019    MCV 85 01/25/2019     (L) 01/25/2019     Lab Results   Component Value Date    K 3 1 (L) 01/25/2019     01/25/2019    CO2 22 01/25/2019    BUN 14 01/25/2019    CREATININE 0 82 01/25/2019    GLUCOSE 342 (H) 01/24/2019    GLUF 157 (H) 01/17/2019    CALCIUM 8 1 (L) 01/25/2019    AST 11 01/24/2019    ALT 21 01/24/2019    ALKPHOS 114 01/24/2019    EGFR 103 01/25/2019     UA shows positive for glucose and positive for ketones     C-peptide 1 7  A1c (1/17/19): 8 7%  TSH: 1 090  Lipase: 213    Code Status: Level 1 - Full Code  Brenda Srivastava, MS4

## 2019-01-25 NOTE — SOCIAL WORK
Initial interview:     CM met with the patient and her boyfriend, at bedside, to review the CM role and discuss possible dc needs  Pt lives with her parent in a 2 story home in [place], [place]  [address]  2nd floor bedroom  Bathroom on each level  Pt is independent, drives and is a full-time student at [employer]  No DME  No hx of VNA  or IP rehab  Pt denied drug, reported rare etoh intake and stated that she "took myself off psychiatric care"  Med record indicated hx of Anxiety, depression, anorexic nervousa and bulemia  No POA or LW  Prescriptions are filled at [facility] [address] in [place]  Main contact: Boyfriend Sharonda April [phone]  SIster Dedrick Cantu [phone]    Admit Dx: New onset type 1 Diabetic  Pt is new to diagnosis and will need RN, Nutrition and CM assist with diabetic med and supplies  DC plan - pending Endocrinology plan of care  CM reviewed d/c planning process including the following: identifying help at home, patient preference for d/c planning needs, Discharge Lounge, [facility] Meds to Bed program, availability of treatment team to discuss questions or concerns patient and/or family may have regarding understanding medications and recognizing signs and symptoms once discharged  CM also encouraged patient to follow up with all recommended appointments after discharge  Patient advised of importance for patient and family to participate in managing patients medical well being

## 2019-01-25 NOTE — CONSULTS
Consultation - Marie Edwards 26 y o  female MRN: [id number]    Unit/Bed#: CW2 217-05 Encounter: [id number]      Assessment/Plan     Assessment/Plan:  New onset diabetes:  Given phenotype, ketonuria, swing in blood sugars, suspect type 1 diabetes  Will order yolanda antibody, islet cell antibody, ia2 antibodies to further evaluate  Discussed with patient that now that she is on insulin drip is eating will transition to basal bolus insulin  Upon discharge, I would suggest discharging on basal bolus insulin  Will transition to Lantus 10 units q h s  And Humalog 3 units with each meal plus scale for correction  Discussed that she will need to check her blood sugar ideally before meals and at bedtime upon discharge  She has seen in the office, we can discuss other methods of measuring blood sugars such as a continuous glucose monitor  Upon discharge, if Lantus is not the preferred basal insulin for the patient's insurance company, we can use Tresiba, Basaglar, Levemir, Toujeo at the same dose instead  Upon discharge, if Humalog is not the preferred mealtime insulin for the patient's insurance, we can use NovoLog, Fiasp, Admelog, or Apidra at the same dose instead  Upon discharge, patient will need a prescription for insulin pens, insulin pen needles, glucometer, testing strips, lancets  CC: Diabetes Consult    History of Present Illness     HPI: Marie Edwards is a 26y o  year old female who presented with generalized weakness, polyuria, polydipsia Dr  Having blood sugar found of over 500 at doctor's office  An A1c of 8 7 as well as ketonuria and glucosuria  She was started on an insulin drip and IV fluids  She states today overall she is feeling a little better but still reports feeling groggy  Appetite is slowly improving  She did have hypoglycemia earlier today while on the insulin drip    She expressed concerns regarding checking her blood sugar and giving herself insulin injections at home     Inpatient consult to Endocrinology  Consult performed by: Rom Lund ordered by: Johnie Patel          Review of Systems   Constitutional: Negative for appetite change  HENT: Negative for congestion and trouble swallowing  Eyes: Negative for visual disturbance  Respiratory: Negative for shortness of breath  Cardiovascular: Negative for palpitations and leg swelling  Gastrointestinal: Negative for abdominal pain, nausea and vomiting  Endocrine: Negative for cold intolerance, heat intolerance, polydipsia and polyuria  Genitourinary: Negative for difficulty urinating and frequency  Musculoskeletal: Negative for arthralgias  Skin: Negative for rash  Neurological: Negative for dizziness and weakness  Psychiatric/Behavioral: Negative for agitation and confusion  Historical Information   Past Medical History:   Diagnosis Date    Anxiety     Anxiety and depression     Depression     [facility]    Eating disorder     history of anorexia/bulemia [id number]    Fracture of fibula     R Salter I     Past Surgical History:   Procedure Laterality Date    WISDOM TOOTH EXTRACTION      WRIST SURGERY      left;  Excision of ganglion     Social History   History   Alcohol Use No     History   Drug Use No     History   Smoking Status    Never Smoker   Smokeless Tobacco    Never Used     Comment: Tobacco smoke exposure (Father smokes cigars)     Family History:   Family History   Problem Relation Age of Onset    Hypertension Mother     Migraines Mother         Headache    Diabetes type II Mother     Varicose Veins Mother     Hyperlipidemia Mother     Diabetes Mother     Arthritis Mother     Depression Mother     Cholelithiasis Father     Hypertension Father     Sarcoidosis Father         Liver    Hyperlipidemia Father     Diabetes Father     Coronary artery disease Father     Nephrolithiasis Father     Cirrhosis Father     Alcohol abuse Father     Thyroid disease Sister     Cancer Family     Diabetes Family     Hypertension Family        Meds/Allergies   Current Facility-Administered Medications   Medication Dose Route Frequency Provider Last Rate Last Dose    acetaminophen (TYLENOL) tablet 650 mg  650 mg Oral Q6H PRN Sumaya Olivas PA-C   650 mg at 01/25/19 0956    aluminum-magnesium hydroxide-simethicone (MYLANTA) 200-200-20 mg/5 mL oral suspension 30 mL  30 mL Oral Q6H PRN Guzman De Jesus MD        docusate sodium (COLACE) capsule 100 mg  100 mg Oral BID Guzman De Jesus MD        enoxaparin (LOVENOX) subcutaneous injection 40 mg  40 mg Subcutaneous Daily Guzman De Jesus MD        fluticasone (FLONASE) 50 mcg/act nasal spray 1 spray  1 spray Nasal Daily Guzman De Jesus MD        insulin regular (HumuLIN R,NovoLIN R) 1 Units/mL in sodium chloride 0 9 % 100 mL infusion  0 3-21 Units/hr Intravenous Titrated Guzman De Jesus MD 1 mL/hr at 01/25/19 1431 1 Units/hr at 01/25/19 1431    montelukast (SINGULAIR) tablet 10 mg  10 mg Oral QAM Guzman De Jesus MD   10 mg at 01/25/19 8841    norgestrel-ethinyl estradiol (LO/OVRAL) 0 3 mg-30 mcg per tablet 1 tablet  1 tablet Oral Daily Guzman De Jesus MD        ondansetron [facility]) injection 4 mg  4 mg Intravenous Q8H PRN Guzman eD Jesus MD        potassium chloride (K-DUR,KLOR-CON) CR tablet 40 mEq  40 mEq Oral Once Sumaya Olivas PA-C        psyllium (METAMUCIL) 1 packet  1 packet Oral Daily Guzman De Jesus MD        sodium chloride 0 9 % infusion  125 mL/hr Intravenous Continuous Guzman De Jesus  mL/hr at 01/24/19 2336 125 mL/hr at 01/24/19 2336     Allergies   Allergen Reactions    Iodides Throat Swelling     Reaction Date: 28Jul2014; Action Taken: none; Category: Allergy;     Iodine        Objective   Vitals: Blood pressure 99/59, pulse 83, temperature 98 6 °F (37 °C), temperature source Oral, resp   rate 18, height 5' 2" (1 575 m), weight 50 kg (110 lb 3 7 oz), last menstrual period 01/12/2019, SpO2 99 %, not currently breastfeeding  Intake/Output Summary (Last 24 hours) at 01/25/19 1537  Last data filed at 01/25/19 1415   Gross per 24 hour   Intake             1660 ml   Output                0 ml   Net             1660 ml     Invasive Devices     Peripheral Intravenous Line            Peripheral IV 01/24/19 Left Antecubital less than 1 day                Physical Exam   Constitutional: She is oriented to person, place, and time  She appears well-developed and well-nourished  No distress  HENT:   Head: Normocephalic and atraumatic  Eyes: Pupils are equal, round, and reactive to light  Conjunctivae are normal    Neck: Normal range of motion  Neck supple  Cardiovascular: Normal rate and regular rhythm  Pulmonary/Chest: Effort normal and breath sounds normal  No respiratory distress  Abdominal: Soft  Bowel sounds are normal  She exhibits no distension  Musculoskeletal: She exhibits no edema  Neurological: She is alert and oriented to person, place, and time  Skin: Skin is warm and dry  No rash noted  She is not diaphoretic  Psychiatric: She has a normal mood and affect  Her behavior is normal    Vitals reviewed  The history was obtained from the review of the chart, patient  Lab Results:       Lab Results   Component Value Date    WBC 7 60 01/25/2019    HGB 12 5 01/25/2019    HCT 36 0 01/25/2019    MCV 85 01/25/2019     (L) 01/25/2019     Lab Results   Component Value Date/Time    BUN 14 01/25/2019 05:19 AM    K 3 1 (L) 01/25/2019 05:19 AM     01/25/2019 05:19 AM    CO2 22 01/25/2019 05:19 AM    CO2 26 01/24/2019 06:17 PM    CREATININE 0 82 01/25/2019 05:19 AM    AST 11 01/24/2019 06:04 PM    ALT 21 01/24/2019 06:04 PM    ALB 4 7 01/24/2019 06:04 PM     No results for input(s): CHOL, HDL, LDL, TRIG, VLDL in the last 72 hours    No results found for: Pablo Nixon  POC Glucose (mg/dl)   Date Value   01/25/2019 172 (H)   01/25/2019 313 (H)   01/25/2019 48 (L)   01/25/2019 248 (H)   01/25/2019 230 (H)   01/25/2019 120   01/25/2019 110   01/25/2019 137   01/25/2019 257 (H)   01/24/2019 352 (H)       Imaging Studies: No imaging to review  Portions of the record may have been created with voice recognition software  Occasional wrong word or "sound a like" substitutions may have occurred due to the inherent limitations of voice recognition software  Read the chart carefully and recognize, using context, where substitutions have occurred

## 2019-01-26 VITALS
SYSTOLIC BLOOD PRESSURE: 108 MMHG | WEIGHT: 110.23 LBS | TEMPERATURE: 98.2 F | OXYGEN SATURATION: 98 % | RESPIRATION RATE: 14 BRPM | BODY MASS INDEX: 20.28 KG/M2 | HEIGHT: 62 IN | DIASTOLIC BLOOD PRESSURE: 63 MMHG | HEART RATE: 81 BPM

## 2019-01-26 LAB
ANION GAP SERPL CALCULATED.3IONS-SCNC: 9 MMOL/L (ref 4–13)
BASOPHILS # BLD AUTO: 0.02 THOUSANDS/ΜL (ref 0–0.1)
BASOPHILS NFR BLD AUTO: 0 % (ref 0–1)
BUN SERPL-MCNC: 7 MG/DL (ref 5–25)
CALCIUM SERPL-MCNC: 8.2 MG/DL (ref 8.3–10.1)
CHLORIDE SERPL-SCNC: 108 MMOL/L (ref 100–108)
CO2 SERPL-SCNC: 22 MMOL/L (ref 21–32)
CREAT SERPL-MCNC: 0.58 MG/DL (ref 0.6–1.3)
EOSINOPHIL # BLD AUTO: 0.1 THOUSAND/ΜL (ref 0–0.61)
EOSINOPHIL NFR BLD AUTO: 2 % (ref 0–6)
ERYTHROCYTE [DISTWIDTH] IN BLOOD BY AUTOMATED COUNT: 12.2 % (ref 11.6–15.1)
GFR SERPL CREATININE-BSD FRML MDRD: 132 ML/MIN/1.73SQ M
GLUCOSE SERPL-MCNC: 130 MG/DL (ref 65–140)
GLUCOSE SERPL-MCNC: 137 MG/DL (ref 65–140)
GLUCOSE SERPL-MCNC: 145 MG/DL (ref 65–140)
GLUCOSE SERPL-MCNC: 227 MG/DL (ref 65–140)
GLUCOSE SERPL-MCNC: 262 MG/DL (ref 65–140)
HCT VFR BLD AUTO: 35.2 % (ref 34.8–46.1)
HGB BLD-MCNC: 11.8 G/DL (ref 11.5–15.4)
IMM GRANULOCYTES # BLD AUTO: 0.02 THOUSAND/UL (ref 0–0.2)
IMM GRANULOCYTES NFR BLD AUTO: 0 % (ref 0–2)
LYMPHOCYTES # BLD AUTO: 3.37 THOUSANDS/ΜL (ref 0.6–4.47)
LYMPHOCYTES NFR BLD AUTO: 49 % (ref 14–44)
MCH RBC QN AUTO: 28.9 PG (ref 26.8–34.3)
MCHC RBC AUTO-ENTMCNC: 33.5 G/DL (ref 31.4–37.4)
MCV RBC AUTO: 86 FL (ref 82–98)
MONOCYTES # BLD AUTO: 0.49 THOUSAND/ΜL (ref 0.17–1.22)
MONOCYTES NFR BLD AUTO: 7 % (ref 4–12)
NEUTROPHILS # BLD AUTO: 2.86 THOUSANDS/ΜL (ref 1.85–7.62)
NEUTS SEG NFR BLD AUTO: 42 % (ref 43–75)
NRBC BLD AUTO-RTO: 0 /100 WBCS
PLATELET # BLD AUTO: 146 THOUSANDS/UL (ref 149–390)
PMV BLD AUTO: 12.5 FL (ref 8.9–12.7)
POTASSIUM SERPL-SCNC: 3.3 MMOL/L (ref 3.5–5.3)
RBC # BLD AUTO: 4.09 MILLION/UL (ref 3.81–5.12)
SODIUM SERPL-SCNC: 139 MMOL/L (ref 136–145)
WBC # BLD AUTO: 6.86 THOUSAND/UL (ref 4.31–10.16)

## 2019-01-26 PROCEDURE — 86341 ISLET CELL ANTIBODY: CPT | Performed by: INTERNAL MEDICINE

## 2019-01-26 PROCEDURE — 99232 SBSQ HOSP IP/OBS MODERATE 35: CPT | Performed by: INTERNAL MEDICINE

## 2019-01-26 PROCEDURE — 83519 RIA NONANTIBODY: CPT | Performed by: INTERNAL MEDICINE

## 2019-01-26 PROCEDURE — 99239 HOSP IP/OBS DSCHRG MGMT >30: CPT | Performed by: INTERNAL MEDICINE

## 2019-01-26 PROCEDURE — 85025 COMPLETE CBC W/AUTO DIFF WBC: CPT | Performed by: PHYSICIAN ASSISTANT

## 2019-01-26 PROCEDURE — 80048 BASIC METABOLIC PNL TOTAL CA: CPT | Performed by: PHYSICIAN ASSISTANT

## 2019-01-26 PROCEDURE — 82948 REAGENT STRIP/BLOOD GLUCOSE: CPT

## 2019-01-26 RX ORDER — SIMETHICONE 80 MG
80 TABLET,CHEWABLE ORAL ONCE
Status: COMPLETED | OUTPATIENT
Start: 2019-01-26 | End: 2019-01-26

## 2019-01-26 RX ORDER — POTASSIUM CHLORIDE 20 MEQ/1
40 TABLET, EXTENDED RELEASE ORAL ONCE
Status: COMPLETED | OUTPATIENT
Start: 2019-01-26 | End: 2019-01-26

## 2019-01-26 RX ADMIN — INSULIN LISPRO 3 UNITS: 100 INJECTION, SOLUTION INTRAVENOUS; SUBCUTANEOUS at 08:33

## 2019-01-26 RX ADMIN — SODIUM CHLORIDE 125 ML/HR: 0.9 INJECTION, SOLUTION INTRAVENOUS at 08:31

## 2019-01-26 RX ADMIN — SIMETHICONE CHEW TAB 80 MG 80 MG: 80 TABLET ORAL at 02:12

## 2019-01-26 RX ADMIN — INSULIN LISPRO 2 UNITS: 100 INJECTION, SOLUTION INTRAVENOUS; SUBCUTANEOUS at 12:38

## 2019-01-26 RX ADMIN — INSULIN LISPRO 3 UNITS: 100 INJECTION, SOLUTION INTRAVENOUS; SUBCUTANEOUS at 16:59

## 2019-01-26 RX ADMIN — ACETAMINOPHEN 650 MG: 325 TABLET, FILM COATED ORAL at 02:12

## 2019-01-26 RX ADMIN — ONDANSETRON 4 MG: 2 INJECTION INTRAMUSCULAR; INTRAVENOUS at 01:53

## 2019-01-26 RX ADMIN — SODIUM CHLORIDE 125 ML/HR: 0.9 INJECTION, SOLUTION INTRAVENOUS at 01:51

## 2019-01-26 RX ADMIN — INSULIN LISPRO 3 UNITS: 100 INJECTION, SOLUTION INTRAVENOUS; SUBCUTANEOUS at 12:38

## 2019-01-26 RX ADMIN — INSULIN LISPRO 2 UNITS: 100 INJECTION, SOLUTION INTRAVENOUS; SUBCUTANEOUS at 16:59

## 2019-01-26 RX ADMIN — POTASSIUM CHLORIDE 40 MEQ: 1500 TABLET, EXTENDED RELEASE ORAL at 14:17

## 2019-01-26 NOTE — PROGRESS NOTES
Insulin pen teaching done with patient and friend, Cori Johnston  Patient did not want to check her own blood sugar or self administer insulin because of her fear of needles  Patient states that her glucometer at home is different than ours and she is familiar with using that one  Her friend, Cori Johnston, who be administering insulin at home, demonstrated how to use insulin pen and states he feels comfortable with information

## 2019-01-26 NOTE — DISCHARGE INSTRUCTIONS
Please check your blood sugar before meals and at bedtime  Keep a log to show your endocrinologist/PCP so that adjustments can be made  Please do not take your mealtime insulin if you are not eating a meal  If you do take your insulin, be sure to eat within 15-30 mins of taking your insulin  If glucose consistently running lower than 70 or higher than 300, please call endocrinology office  For Sliding Scale Insulin coverage  You will give yourself the 3 units of novolog normally (meal time) and additionally, give this insulin in addition to the 3 units as below for sugars in these ranges:    Glucose 150-209: 1 unit novolog  Glucose 210-269: 2 units novolog  Glucose  270-329: 3 units novolog  Glucose 330-389: 4 units novolog  Glucose > 390: 5 units novolog       Please call endocrinology office with any questions/concerns  Please call Monday to make appointment ASAP!!!      Type 2 Diabetes in Adults   WHAT YOU NEED TO KNOW:   Type 2 diabetes is a disease that affects how your body uses glucose (sugar)  Normally, when the blood sugar level increases, the pancreas makes more insulin  Insulin helps move sugar out of the blood so it can be used for energy  Type 2 diabetes develops because either the body cannot make enough insulin, or it cannot use the insulin correctly  After many years, your pancreas may stop making insulin     DISCHARGE INSTRUCTIONS:   Call 911 for any of the following:   · You have any of the following signs of a stroke:      ¨ Numbness or drooping on one side of your face     ¨ Weakness in an arm or leg    ¨ Confusion or difficulty speaking    ¨ Dizziness, a severe headache, or vision loss    · You have any of the following signs of a heart attack:      ¨ Squeezing, pressure, or pain in your chest that lasts longer than 5 minutes or returns    ¨ Discomfort or pain in your back, neck, jaw, stomach, or arm     ¨ Trouble breathing    ¨ Nausea or vomiting    ¨ Lightheadedness or a sudden cold sweat, especially with chest pain or trouble breathing  Seek care immediately if:   · You have severe abdominal pain, or the pain spreads to your back  You may also be vomiting  · You have trouble staying awake or focusing  · You are shaking or sweating  · You have blurred or double vision  · Your breath has a fruity, sweet smell  · Your breathing is deep and labored, or rapid and shallow  · Your heartbeat is fast and weak  Contact your healthcare provider if:   · You are vomiting or have diarrhea  · You have an upset stomach and cannot eat the foods on your meal plan  · You feel weak or more tired than usual      · You feel dizzy, have headaches, or are easily irritated  · Your skin is red, warm, dry, or swollen  · You have a wound that does not heal      · You have numbness in your arms or legs  · You have trouble coping with your illness, or you feel anxious or depressed  · You have questions or concerns about your condition or care  Medicines: You may  need any of the following:  · Hypoglycemic medicines or insulin  may be given to decrease the amount of sugar in your blood  · Blood pressure medicine  may be given to lower your blood pressure  Your blood pressure should be less than 140/90  · Cholesterol lowering medicine  may be given to prevent heart disease  · Antiplatelets , such as aspirin, help prevent blood clots  Take your antiplatelet medicine exactly as directed  These medicines make it more likely for you to bleed or bruise  If you are told to take aspirin, do not take acetaminophen or ibuprofen instead  · Take your medicine as directed  Contact your healthcare provider if you think your medicine is not helping or if you have side effects  Tell him or her if you are allergic to any medicine  Keep a list of the medicines, vitamins, and herbs you take  Include the amounts, and when and why you take them   Bring the list or the pill bottles to follow-up visits  Carry your medicine list with you in case of an emergency  Check your blood sugar level as directed: You will be taught how to use a glucose monitor  Ask your healthcare provider when and how often to check during the day  You will need to check your blood sugar level at least 3 times each day if you are on insulin  If you check your blood sugar level before a meal , it should be between 80 and 130 mg/dL  If you check your blood sugar level 1 to 2 hours after a meal , it should be less than 180 mg/dL  Ask your healthcare provider if these are good goals for you  Write down your results, and show them to your healthcare provider  He may use the results to make changes to your medicine, food, or exercise schedules  If your blood sugar level is too low: Your blood sugar level is too low if it goes below 70 mg/dL  If the level is too low, eat or drink 15 grams of fast-acting carbohydrate  These are found naturally in fruits  Fast-acting carbohydrates will raise your blood sugar level quickly  Examples of 15 grams of fast-acting carbohydrate are 4 ounces (½ cup) of fruit juice or 4 ounces of regular soda  Other examples are 2 tablespoons of raisins or 3 to 4 glucose tablets  Check your blood sugar level 15 minutes later  If the level is still low (less than 100 mg/dL), eat another 15 grams of carbohydrate  When the level returns to 100 mg/dL, eat a snack or meal that contains carbohydrates  This will help prevent another drop in blood sugar  Always carefully follow your healthcare provider's instructions on how to treat low blood sugar levels  Wear medical alert identification:  Wear medical alert jewelry or carry a card that says you have diabetes  Ask your healthcare provider where to get these items  Check your feet each day for sores:  Wear shoes and socks that fit correctly  Do not trim your toenails  Ask your healthcare provider for more information about foot care  Maintain a healthy weight:  Ask your healthcare provider how much you should weigh  A healthy weight can help you control your diabetes  Ask your provider to help you create a weight loss plan if you are overweight  Together you can set manageable weight loss goals  Follow your meal plan:  A dietitian will help you make a meal plan to keep your blood sugar level steady  Do not skip meals  Your blood sugar level may drop too low if you have taken diabetes medicine and do not eat  · Keep track of carbohydrates (sugar and starchy foods)  Your blood sugar level can get too high if you eat too many carbohydrates  Your dietitian will help you plan meals and snacks that have the right amount of carbohydrates  · Eat low-fat foods , such as skinless chicken and low-fat milk  · Eat less sodium (salt)  Limit high-sodium foods, such as soy sauce, potato chips, and soup  Do not add salt to food you cook  Limit your use of table salt  You should have less than 2,300 mg of sodium per day  · Eat high-fiber foods , such as vegetables, whole grain breads, and beans  · Limit alcohol  Alcohol affects your blood sugar level and can make it harder to manage your diabetes  Limit alcohol to 1 drink a day if you are a woman  Limit alcohol to 2 drinks a day if you are a man  A drink of alcohol is 12 ounces of beer, 5 ounces of wine, or 1½ ounces of liquor  Exercise as directed:  Exercise can help keep your blood sugar level steady, decrease your risk of heart disease, and help you lose weight  Stretch before and after you exercise  Exercise for at least 150 minutes every week  Spread this amount of exercise over at least 3 days a week  Do not skip exercise more than 2 days in a row  Include muscle strengthening activities 2 to 3 days each week  Older adults should include balance training 2 to 3 times each week   Activities that help increase balance include yoga and mary chi  Work with your healthcare provider to create an exercise plan  · Check your blood sugar level before and after exercise  Healthcare providers may tell you to change the amount of insulin you take or food you eat  If your blood sugar level is high, check your blood or urine for ketones before you exercise  Do not exercise if your blood sugar level is high and you have ketones  · If your blood sugar level is less than 100 mg/dL, have a carbohydrate snack before you exercise  Examples are 4 to 6 crackers, ½ banana, 8 ounces (1 cup) of milk, or 4 ounces (½ cup) of juice  Drink water or liquids that do not contain sugar before, during, and after exercise  Ask your dietitian or healthcare provider which liquids you should drink when you exercise  · Do not sit for longer than 30 minutes  If you cannot walk around, at least stand up  This will help you stay active and keep your blood circulating  Do not smoke:  Nicotine and other chemicals in cigarettes and cigars can cause lung damage and make it more difficult to manage your diabetes  Ask your healthcare provider for information if you currently smoke and need help to quit  Do not use e-cigarettes or smokeless tobacco in place of cigarettes or to help you quit  They still contain nicotine  Check your blood pressure as directed:  Ask your healthcare provider what your blood pressure should be  Most adults with diabetes and high blood pressure should have a systolic blood pressure (first number) less than 140  Your diastolic blood pressure (second number) should be less than 90  Ask about vaccines: You have a higher risk for serious illness if you get the flu, pneumonia, or hepatitis  Ask your healthcare provider if you should get a flu, pneumonia, or hepatitis B vaccine, and when to get the vaccine  Follow up with your healthcare provider as directed: You may need to return to have your A1c checked every 3 months  You will need to return at least once each year to have your feet checked   You will need an eye exam once a year to check for retinopathy  You will also need urine tests every year to check for kidney problems  You may need tests to monitor for heart disease such as an EKG, stress test, blood pressure monitoring, and blood tests  Write down your questions so you remember to ask them during your visits  © 2017 [employer]  Information is for End User's use only and may not be sold, redistributed or otherwise used for commercial purposes  All illustrations and images included in CareNotes® are the copyrighted property of [employer]  or [employer] Doming[employer]  The above information is an  only  It is not intended as medical advice for individual conditions or treatments  Talk to your doctor, nurse or pharmacist before following any medical regimen to see if it is safe and effective for you

## 2019-01-26 NOTE — DISCHARGE SUMMARY
Discharge- Nav Artem 1995, 26 y o  female MRN: [id number]    [facility]: [id number]    Primary Care Provider: MARGARET Silver   Date and time admitted to hospital: 1/24/2019  5:39 PM        * Diabetes mellitus, new onset [facility])   Assessment & Plan    Lab Results   Component Value Date    HGBA1C 8 7 (H) 01/17/2019     Recent Labs      01/25/19   2207  01/26/19   0156  01/26/19   0635  01/26/19   1150   POCGLU  159*  130  145*  227*     Blood Sugar Average: Last 72 hrs:  (P) 201     · New onset  Presented with generalized weakness, polydipsia, polyuria  A1C 8 7  Felt to be type 1 (antibiodies in process, can f/u with them as outpatient)  · Endocrinology following, input is appreciated  Off IV insulin and transitioned to subcutaneous regimen  At discharge, per endocrinology, continue:  · Lantus 10 units HS  · Novolog 3 units TID with meals plus scale for correction (instructions given)  · Needs to eat within 15-30 mins of taking this and skip this if NOT eating  · Will need to check sugars before meals and at bedtime  She will need close endocrinology follow up to discuss possibility of continuous glucose monitor  RN education provided  · Scripts also provided for glucometer, test strips, lancets, insulin needles for pens  · Instructed to keep glucose log to bring to appointments for further adjustment     Hypokalemia   Assessment & Plan    · Repleted and monitor   Has improved         Discharging Physician / Practitioner: Huey Nolasco, [facility]  PCP: Eddie Silver [facility]  Admission Date:   Admission Orders     Ordered        01/25/19 1428  Inpatient Admission  Once         01/24/19 1918  Place in Observation  Once             Discharge Date: 01/26/19    Resolved Problems  Date Reviewed: 1/26/2019    None          Consultations During Hospital Stay:  · Endocrinology    Procedures Performed:   · A1C: 8 7   · Glutamic acid decarboxylase pending  · IA2 autoantibodies pending  · Anti-islet cell antibody pending    Significant Findings / Test Results:   · See above    Incidental Findings:   · See above     Test Results Pending at Discharge (will require follow up): · See above     Outpatient Tests Requested:  · F/u with endocrinology  · F/u with PCP    Complications:  none    Reason for Admission: new onset DM    Hospital Course:     Tristan Sesay is a 26 y o  female patient who originally presented to the hospital on 1/24/2019 due to new onset diabetes  Patient had been experiencing generalized weakness, polyuria and polydipsia for a few weeks  She had been seeing PCP and was felt to potentially have new onset diabetes  She had A1C of 7 in December  She presented to her PCP and felt much worse and was sent to ER  She was seen by endocrinology  Started on lantus 10 units HS and novolog 3 units TID with meals plus scale  She was cleared by endocrinology for discharge with very close follow up  Diabetes education provided to patient  Education by RNs was done  Please see above list of diagnoses and related plan for additional information  Condition at Discharge: stable     Discharge Day Visit / Exam:     * Please refer to separate progress note for these details *    Discussion with Family: patient and sister at bedside  Discharge instructions/Information to patient and family:   See after visit summary for information provided to patient and family  Provisions for Follow-Up Care:  See after visit summary for information related to follow-up care and any pertinent home health orders  Disposition:     Home    For Discharges to [facility]:   · Not Applicable to this Patient - Not Applicable to this Patient    Planned Readmission: no     Discharge Statement:  I spent 45 minutes discharging the patient  This time was spent on the day of discharge  I had direct contact with the patient on the day of discharge   Greater than 50% of the total time was spent examining patient, answering all patient questions, arranging and discussing plan of care with patient as well as directly providing post-discharge instructions  Additional time then spent on discharge activities  Discharge Medications:  See after visit summary for reconciled discharge medications provided to patient and family        ** Please Note: This note has been constructed using a voice recognition system **

## 2019-01-26 NOTE — PROGRESS NOTES
Progress Note - Nav Mcgill 26 y o  female MRN: [id number]    Unit/Bed#: [facility] 820-01 Encounter: [id number]      CC: diabetes f/u    Subjective:   Nav Mcgill is a 26y o  year old female with new onset diabetes  Feels well  No complaints  No hypoglycemia  Objective:     Vitals: Blood pressure 105/69, pulse 84, temperature 98 8 °F (37 1 °C), temperature source Oral, resp  rate 18, height 5' 2" (1 575 m), weight 50 kg (110 lb 3 7 oz), last menstrual period 01/12/2019, SpO2 100 %, not currently breastfeeding  ,Body mass index is 20 16 kg/m²  Intake/Output Summary (Last 24 hours) at 01/26/19 0825  Last data filed at 01/26/19 0148   Gross per 24 hour   Intake             4055 ml   Output                0 ml   Net             4055 ml       Physical Exam:  General: No acute distress  Alert, awake, and oriented x3  HEENT: Normocephalic, atraumatic  Heart: Regular rate and rhythm  Lungs: Clear  No respiratory distress  Extremities: No edema  Skin: Warm, dry  No rash  Neuro: Moves all 4 extremities spontaneously  Psych: Appropriate mood and affect  Cooperative  Lab, Imaging and other studies: I have personally reviewed pertinent reports  POC Glucose (mg/dl)   Date Value   01/26/2019 145 (H)   01/26/2019 130   01/25/2019 159 (H)   01/25/2019 98   01/25/2019 163 (H)   01/25/2019 302 (H)   01/25/2019 108   01/25/2019 172 (H)   01/25/2019 313 (H)   01/25/2019 48 (L)       Assessment/Plan:  New onset diabetes mellitus: Antibodies drawn today and will take some time to come back    Transitioned to subQ insulin last night  Monitor progress over the course of today  If sugars reasonable, ok for discharge after dinner from endocrine perspective  Will need insulin injection education for pens and glucometer instruction  Discussed with her that when she is home to not take Humalog/mealtime insulin if not eating and to make sure she eats within 15-30 minutes of taking mealtime insulin    Discharge recommendations per my initial consult from yesterday  Portions of the record may have been created with voice recognition software  Occasional wrong word or "sound a like" substitutions may have occurred due to the inherent limitations of voice recognition software  Read the chart carefully and recognize, using context, where substitutions have occurred

## 2019-01-26 NOTE — ASSESSMENT & PLAN NOTE
Lab Results   Component Value Date    HGBA1C 8 7 (H) 01/17/2019     Recent Labs      01/25/19   2207  01/26/19   0156  01/26/19   0635  01/26/19   1150   POCGLU  159*  130  145*  227*     Blood Sugar Average: Last 72 hrs:  (P) 201     · New onset  Presented with generalized weakness, polydipsia, polyuria  A1C 8 7  Felt to be type 1 (antibiodies in process, can f/u with them as outpatient)  · Endocrinology following, input is appreciated  Off IV insulin and transitioned to subcutaneous regimen  At discharge, per endocrinology, continue:  · Lantus 10 units HS  · Novolog 3 units TID with meals plus scale for correction (instructions given)  · Needs to eat within 15-30 mins of taking this and skip this if NOT eating  · Will need to check sugars before meals and at bedtime  She will need close endocrinology follow up to discuss possibility of continuous glucose monitor  RN education provided    · Scripts also provided for glucometer, test strips, lancets, insulin needles for pens  · Instructed to keep glucose log to bring to appointments for further adjustment

## 2019-01-26 NOTE — ASSESSMENT & PLAN NOTE
Lab Results   Component Value Date    HGBA1C 8 7 (H) 01/17/2019     Recent Labs      01/25/19   2207  01/26/19   0156  01/26/19   0635  01/26/19   1150   POCGLU  159*  130  145*  227*     Blood Sugar Average: Last 72 hrs:  (P) 201     · New onset  Presented with generalized weakness, polydipsia, polyuria  A1C 8 7  Felt to be type 1 (antibiodies in process, can f/u with them as outpatient)  · Endocrinology following, input is appreciated  Off IV insulin and transitioned to subcutaneous regimen  At discharge, per endocrinology, continue:  · Lantus 10 units HS  · Novolog 3 units TID with meals plus scale for correction  Needs to eat within 15-30 mins of taking this and skip this if NOT eating  · Will need to check sugars before meals and at bedtime  She will need close endocrinology follow up to discuss possibility of continuous glucose monitor  RN education provided    · Scripts also provided for glucometer, test strips, lancets, insulin needles for pens  · Instructed to keep glucose log to bring to appointments for further adjustment

## 2019-01-26 NOTE — PROGRESS NOTES
Progress Note - Elin Hines 1995, 26 y o  female MRN: [id number]    [facility] Encounter: [id number]    Primary Care Provider: MARGARET Herrera   Date and time admitted to hospital: 1/24/2019  5:39 PM        * Diabetes mellitus, new onset [facility])   Assessment & Plan    Lab Results   Component Value Date    HGBA1C 8 7 (H) 01/17/2019     Recent Labs      01/25/19   2207  01/26/19   0156  01/26/19   0635  01/26/19   1150   POCGLU  159*  130  145*  227*     Blood Sugar Average: Last 72 hrs:  (P) 201     · New onset  Presented with generalized weakness, polydipsia, polyuria  A1C 8 7  Felt to be type 1 (antibiodies in process, can f/u with them as outpatient)  · Endocrinology following, input is appreciated  Off IV insulin and transitioned to subcutaneous regimen  At discharge, per endocrinology, continue:  · Lantus 10 units HS  · Novolog 3 units TID with meals plus scale for correction  Needs to eat within 15-30 mins of taking this and skip this if NOT eating  · Will need to check sugars before meals and at bedtime  She will need close endocrinology follow up to discuss possibility of continuous glucose monitor  RN education provided  · Scripts also provided for glucometer, test strips, lancets, insulin needles for pens  · Instructed to keep glucose log to bring to appointments for further adjustment     Hypokalemia   Assessment & Plan    · Repleted and monitor  Has improved       VTE Pharmacologic Prophylaxis:   Pharmacologic: Enoxaparin (Lovenox)  Mechanical VTE Prophylaxis in Place: Yes    Patient Centered Rounds: I have performed bedside rounds with nursing staff today  Discussions with Specialists or Other Care Team Provider: endo input appreciated  Education and Discussions with Family / Patient: patient  Mother at bedside  Time Spent for Care: 30 minutes  More than 50% of total time spent on counseling and coordination of care as described above      Current Length of Stay: 1 day(s)    Current Patient Status: Inpatient   Certification Statement: The patient will continue to require additional inpatient hospital stay due to monitor glucose over course of today    Discharge Plan: tentatively dc later today  Checking price of diabetic supplies and monitoring glucose throughout day today  Code Status: Level 1 - Full Code      Subjective:   Patient reports doing okay today  Was a bit nauseous yesterday but did not vomit  No abdominal pain  Tolerating diet  No hypoglycemia  Objective:     Vitals:   Temp (24hrs), Av 5 °F (36 9 °C), Min:98 2 °F (36 8 °C), Max:98 8 °F (37 1 °C)    Temp:  [98 2 °F (36 8 °C)-98 8 °F (37 1 °C)] 98 8 °F (37 1 °C)  HR:  [73-92] 84  Resp:  [16-18] 18  BP: (105-111)/(56-77) 105/69  SpO2:  [96 %-100 %] 100 %  Body mass index is 20 16 kg/m²  Input and Output Summary (last 24 hours): Intake/Output Summary (Last 24 hours) at 19 1400  Last data filed at 19 1237   Gross per 24 hour   Intake          5160 83 ml   Output                0 ml   Net          5160 83 ml       Physical Exam:     Physical Exam   Constitutional: She is oriented to person, place, and time  She appears well-developed and well-nourished  Cardiovascular: Normal rate and regular rhythm  Pulmonary/Chest: Effort normal and breath sounds normal  No respiratory distress  She has no wheezes  Abdominal: Soft  Bowel sounds are normal    Musculoskeletal: She exhibits no edema  Neurological: She is alert and oriented to person, place, and time  Skin: There is pallor  Nursing note and vitals reviewed        Additional Data:     Labs:      Results from last 7 days  Lab Units 19  0441   WBC Thousand/uL 6 86   HEMOGLOBIN g/dL 11 8   HEMATOCRIT % 35 2   PLATELETS Thousands/uL 146*   NEUTROS PCT % 42*   LYMPHS PCT % 49*   MONOS PCT % 7   EOS PCT % 2       Results from last 7 days  Lab Units 19  0441  19  1817 19  1804   POTASSIUM mmol/L 3 3*  < >  -- 3  8   CHLORIDE mmol/L 108  < >  --  100   CO2 mmol/L 22  < >  --  23   CO2, I-STAT mmol/L  --   --  26  --    BUN mg/dL 7  < >  --  11   CREATININE mg/dL 0 58*  < >  --  0 86   CALCIUM mg/dL 8 2*  < >  --  9 7   ALK PHOS U/L  --   --   --  114   ALT U/L  --   --   --  21   AST U/L  --   --   --  11   GLUCOSE, ISTAT mg/dl  --   --  342*  --    < > = values in this interval not displayed  * I Have Reviewed All Lab Data Listed Above  * Additional Pertinent Lab Tests Reviewed: All Labs Within Last 24 Hours Reviewed    Imaging:    Imaging Reports Reviewed Today Include: all  Imaging Personally Reviewed by Myself Includes:  none    Recent Cultures (last 7 days):           Last 24 Hours Medication List:     Current Facility-Administered Medications:  acetaminophen 650 mg Oral Q6H PRN Gabrielle Miranda PA-C   aluminum-magnesium hydroxide-simethicone 30 mL Oral Q6H PRN Shiv Lincoln MD   docusate sodium 100 mg Oral BID Shiv Lincoln MD   enoxaparin 40 mg Subcutaneous Daily Shiv Lincoln MD   fluticasone 1 spray Nasal Daily Shiv Lincoln MD   insulin glargine 10 Units Subcutaneous HS Wyatt Dinning, DO   insulin lispro 1-5 Units Subcutaneous TID AC Wyatt Dinning, DO   insulin lispro 1-5 Units Subcutaneous HS Wyatt Dinning, DO   insulin lispro 3 Units Subcutaneous TID With Meals Wyatt Estuardo, DO   levonorgestrel-ethinyl estradiol 1 tablet Oral Daily Zulma Lucas PA-C   montelukast 10 mg Oral QAM Shiv Lincoln MD   ondansetron 4 mg Intravenous Q8H PRN Shiv Lincoln MD   potassium chloride 40 mEq Oral Once Lloyd Mcneil PA-C   psyllium 1 packet Oral Daily Shiv Lincoln MD        Today, Patient Was Seen By: Lloyd Mcneil PA-C    ** Please Note: Dictation voice to text software may have been used in the creation of this document   **

## 2019-01-28 LAB — PANC ISLET CELL AB TITR SER: NEGATIVE {TITER}

## 2019-01-29 ENCOUNTER — OFFICE VISIT (OUTPATIENT)
Dept: ENDOCRINOLOGY | Facility: CLINIC | Age: 24
End: 2019-01-29
Payer: COMMERCIAL

## 2019-01-29 VITALS
WEIGHT: 293 LBS | HEART RATE: 76 BPM | BODY MASS INDEX: 53.92 KG/M2 | HEIGHT: 62 IN | DIASTOLIC BLOOD PRESSURE: 80 MMHG | SYSTOLIC BLOOD PRESSURE: 108 MMHG

## 2019-01-29 DIAGNOSIS — E10.65 TYPE 1 DIABETES MELLITUS WITH HYPERGLYCEMIA (HCC): Primary | ICD-10-CM

## 2019-01-29 DIAGNOSIS — F32.A ANXIETY AND DEPRESSION: ICD-10-CM

## 2019-01-29 DIAGNOSIS — R79.89 ABNORMAL LIVER FUNCTION TEST: ICD-10-CM

## 2019-01-29 DIAGNOSIS — F41.9 ANXIETY AND DEPRESSION: ICD-10-CM

## 2019-01-29 LAB — GAD65 AB SER-ACNC: 558.1 U/ML (ref 0–5)

## 2019-01-29 PROCEDURE — 99214 OFFICE O/P EST MOD 30 MIN: CPT | Performed by: INTERNAL MEDICINE

## 2019-01-29 NOTE — ASSESSMENT & PLAN NOTE
Patient gives history of anxiety and depression-currently not in treatment or following with a therapist   She is having difficult time dealing with a new diagnosis of type 1 diabetes  Have advised her to discussed with primary care to go back to therapy

## 2019-01-29 NOTE — LETTER
January 29, 2019     [place] Mur[place], [address]    Patient: Hannah Rivera   YOB: 1995   Date of Visit: 1/29/2019       Dear Dr Vidal Sesay: Thank you for referring Hannah Rivera to me for evaluation  Below are my notes for this consultation  If you have questions, please do not hesitate to call me  I look forward to following your patient along with you  Sincerely,        Be Askew MD        CC: No Recipients  Be Askew MD  1/29/2019 12:35 PM  Sign at close encounter   Hannah Rivera 26 y o  female MRN: [id number]    Encounter: [id number]      Assessment/Plan     Problem List Items Addressed This Visit     Abnormal liver function test    Anxiety and depression     Patient gives history of anxiety and depression-currently not in treatment or following with a therapist   She is having difficult time dealing with a new diagnosis of type 1 diabetes  Have advised her to discussed with primary care to go back to therapy  Type 1 diabetes mellitus with hyperglycemia (HCC) - Primary     Lab Results   Component Value Date    HGBA1C 8 7 (H) 01/17/2019       Fasting sugars improving however continues to have pre meal hyperglycemia-patient restricting carbs as she is concerned about the high sugars  For now I have advised her to decrease Lantus to 8 units at bedtime, increase NovoLog to 4 units before meals as well as a coverage scale of 1 unit for every 50 mg/dL above a target of 150  She will monitor blood sugar 4 times a day and send over log in 2 weeks    I am also going to set her up for diabetes education, carb counting and learning flexible insulin  Davy and islet cell antibodies are pending           Relevant Orders    Ambulatory referral to Diabetic Education        CC: Diabetes    History of Present Illness     HPI:  72-year-old woman who was recently admitted to the hospital with severe hyperglycemia as well as ketonuria and glucosuria and started on basal bolus insulin therapy   polyuria , polydypsia , dry mouth since dec   Lost about 10-11 lbs   Currently on lantus 10  novolg 3 units before meals + covergae scale   Fasting 130-170s   premeal and bedtime 220-260s  no hypoglycemia   Polyuria improved and continues to have polydypsia and dry mouth   C/o fatigue   Mental Fogginess       Review of Systems   Constitutional: Positive for fatigue and unexpected weight change  Eyes: Negative for visual disturbance  Respiratory: Negative for cough and shortness of breath  Cardiovascular: Positive for leg swelling  Negative for palpitations  Gastrointestinal: Positive for nausea  Negative for constipation, diarrhea and vomiting  Endocrine: Positive for polydipsia and polyuria  Musculoskeletal: Negative for gait problem  Skin: Negative for wound  Psychiatric/Behavioral: Positive for sleep disturbance  The patient is nervous/anxious  All other systems reviewed and are negative  Historical Information   Past Medical History:   Diagnosis Date    Anxiety     Anxiety and depression     Depression     [facility]    Eating disorder     history of anorexia/bulemia [id number]    Fracture of fibula     R Salter I     Past Surgical History:   Procedure Laterality Date    WISDOM TOOTH EXTRACTION      WRIST SURGERY      left;  Excision of ganglion     Social History   History   Alcohol Use No     History   Drug Use No     History   Smoking Status    Never Smoker   Smokeless Tobacco    Never Used     Comment: Tobacco smoke exposure (Father smokes cigars)     Family History:   Family History   Problem Relation Age of Onset    Hypertension Mother     Migraines Mother         Headache    Diabetes type II Mother     Varicose Veins Mother     Hyperlipidemia Mother     Diabetes Mother     Arthritis Mother     Depression Mother     Cholelithiasis Father     Hypertension Father     Sarcoidosis Father Liver    Hyperlipidemia Father     Diabetes Father     Coronary artery disease Father     Nephrolithiasis Father     Cirrhosis Father     Alcohol abuse Father     Thyroid disease Sister     Cancer Family     Diabetes Family     Hypertension Family        Meds/Allergies   Current Outpatient Prescriptions   Medication Sig Dispense Refill    ALTAVERA 0 15-30 MG-MCG per tablet       Cetirizine HCl (ZYRTEC PO) Take by mouth      insulin aspart (NOVOLOG FLEXPEN) 100 Units/mL injection pen Inject 3 Units under the skin 3 (three) times a day with meals 5 pen 0    insulin glargine (LANTUS SOLOSTAR) 100 units/mL injection pen Inject 10 Units under the skin daily at bedtime 5 pen 0    Insulin Pen Needle 29G X 5MM MISC by Does not apply route 4 (four) times a day (before meals and at bedtime) 200 each 0    montelukast (SINGULAIR) 10 mg tablet Take 10 mg by mouth every morning  5    calcium polycarbophil (FIBERCON) 625 mg tablet Take 1 tablet (625 mg total) by mouth daily (Patient not taking: Reported on 1/25/2019 ) 30 tablet 0    fluticasone (FLONASE) 50 mcg/act nasal spray 1 spray into each nostril daily      meloxicam (MOBIC) 15 mg tablet Take 1 tablet (15 mg total) by mouth daily (Patient not taking: Reported on 1/29/2019 ) 30 tablet 0     No current facility-administered medications for this visit  Allergies   Allergen Reactions    Iodides Throat Swelling     Reaction Date: 28Jul2014; Action Taken: none; Category: Allergy;     Iodine        Objective   Vitals: Blood pressure 108/80, pulse 76, height 5' 2" (1 575 m), weight (!) 533 kg (1174 lb 6 4 oz), last menstrual period 01/12/2019, not currently breastfeeding  Physical Exam   Constitutional: She is oriented to person, place, and time  She appears well-developed and well-nourished  No distress  HENT:   Head: Normocephalic and atraumatic  Mouth/Throat: No oropharyngeal exudate     Eyes: Conjunctivae and EOM are normal  No scleral icterus  Neck: Normal range of motion  Neck supple  No thyromegaly present  Cardiovascular: Normal rate, regular rhythm and normal heart sounds  No murmur heard  Pulmonary/Chest: Breath sounds normal  No respiratory distress  She has no wheezes  She has no rales  Abdominal: Soft  Bowel sounds are normal  She exhibits no distension  There is no tenderness  There is no rebound  Musculoskeletal: Normal range of motion  She exhibits no edema or deformity  Lymphadenopathy:     She has no cervical adenopathy  Neurological: She is alert and oriented to person, place, and time  Skin: Skin is warm and dry  No rash noted  No erythema  No pallor  Psychiatric: Her behavior is normal  Judgment and thought content normal    Mood dysphoric       The history was obtained from the review of the chart, patient      Lab Results:   Lab Results   Component Value Date/Time    Hemoglobin A1C 8 7 (H) 01/17/2019 05:47 PM    Hemoglobin A1C 7 1 (H) 12/15/2018 11:23 AM    WBC 6 86 01/26/2019 04:41 AM    WBC 7 60 01/25/2019 05:19 AM    WBC 7 31 01/24/2019 06:04 PM    Hemoglobin 11 8 01/26/2019 04:41 AM    Hemoglobin 12 5 01/25/2019 05:19 AM    Hemoglobin 15 6 (H) 01/24/2019 06:04 PM    Hgb, i-STAT 16 0 (H) 01/24/2019 06:17 PM    Hematocrit 35 2 01/26/2019 04:41 AM    Hematocrit 36 0 01/25/2019 05:19 AM    Hematocrit 44 4 01/24/2019 06:04 PM    Hct, i-STAT 47 (H) 01/24/2019 06:17 PM    MCV 86 01/26/2019 04:41 AM    MCV 85 01/25/2019 05:19 AM    MCV 83 01/24/2019 06:04 PM    Platelets 124 (L) 25/78/3513 04:41 AM    Platelets 114 (L) 79/08/7239 05:19 AM    Platelets 759 00/54/1609 11:39 PM    BUN 7 01/26/2019 04:41 AM    BUN 14 01/25/2019 05:19 AM    BUN 11 01/24/2019 06:04 PM    Potassium 3 3 (L) 01/26/2019 04:41 AM    Potassium 3 1 (L) 01/25/2019 05:19 AM    Potassium 3 8 01/24/2019 06:04 PM    Chloride 108 01/26/2019 04:41 AM    Chloride 107 01/25/2019 05:19 AM    Chloride 100 01/24/2019 06:04 PM    CO2 22 01/26/2019 04:41 AM CO2 22 01/25/2019 05:19 AM    CO2 23 01/24/2019 06:04 PM    CO2, i-STAT 26 01/24/2019 06:17 PM    Creatinine 0 58 (L) 01/26/2019 04:41 AM    Creatinine 0 82 01/25/2019 05:19 AM    Creatinine 0 86 01/24/2019 06:04 PM    AST 11 01/24/2019 06:04 PM    AST 15 01/17/2019 05:47 PM    AST 48 (H) 03/06/2018 12:24 PM    ALT 21 01/24/2019 06:04 PM    ALT 21 01/17/2019 05:47 PM    ALT 17 03/06/2018 12:24 PM    Albumin 4 7 01/24/2019 06:04 PM    Albumin 4 7 01/17/2019 05:47 PM    Albumin 3 9 03/06/2018 12:24 PM    HDL 51 12/15/2018 11:23 AM    HDL, Direct 56 01/17/2019 05:47 PM    Triglycerides 74 01/17/2019 05:47 PM    Triglycerides 65 12/15/2018 11:23 AM             Portions of the record may have been created with voice recognition software  Occasional wrong word or "sound a like" substitutions may have occurred due to the inherent limitations of voice recognition software  Read the chart carefully and recognize, using context, where substitutions have occurred

## 2019-01-29 NOTE — ASSESSMENT & PLAN NOTE
Lab Results   Component Value Date    HGBA1C 8 7 (H) 01/17/2019       Fasting sugars improving however continues to have pre meal hyperglycemia-patient restricting carbs as she is concerned about the high sugars  For now I have advised her to decrease Lantus to 8 units at bedtime, increase NovoLog to 4 units before meals as well as a coverage scale of 1 unit for every 50 mg/dL above a target of 150  She will monitor blood sugar 4 times a day and send over log in 2 weeks    I am also going to set her up for diabetes education, carb counting and learning flexible insulin  Davy and islet cell antibodies are pending

## 2019-01-29 NOTE — PROGRESS NOTES
Herman Jama 26 y o  female MRN: [id number]    Encounter: [id number]      Assessment/Plan     Problem List Items Addressed This Visit     Abnormal liver function test    Anxiety and depression     Patient gives history of anxiety and depression-currently not in treatment or following with a therapist   She is having difficult time dealing with a new diagnosis of type 1 diabetes  Have advised her to discussed with primary care to go back to therapy  Type 1 diabetes mellitus with hyperglycemia (HCC) - Primary     Lab Results   Component Value Date    HGBA1C 8 7 (H) 01/17/2019       Fasting sugars improving however continues to have pre meal hyperglycemia-patient restricting carbs as she is concerned about the high sugars  For now I have advised her to decrease Lantus to 8 units at bedtime, increase NovoLog to 4 units before meals as well as a coverage scale of 1 unit for every 50 mg/dL above a target of 150  She will monitor blood sugar 4 times a day and send over log in 2 weeks  I am also going to set her up for diabetes education, carb counting and learning flexible insulin  Davy and islet cell antibodies are pending           Relevant Orders    Ambulatory referral to Diabetic Education        CC: Diabetes    History of Present Illness     HPI:  27-year-old woman who was recently admitted to the hospital with severe hyperglycemia as well as ketonuria and glucosuria and started on basal bolus insulin therapy   polyuria , polydypsia , dry mouth since dec   Lost about 10-11 lbs   Currently on lantus 10  novolg 3 units before meals + covergae scale   Fasting 130-170s   premeal and bedtime 220-260s  no hypoglycemia   Polyuria improved and continues to have polydypsia and dry mouth   C/o fatigue   Mental Fogginess       Review of Systems   Constitutional: Positive for fatigue and unexpected weight change  Eyes: Negative for visual disturbance  Respiratory: Negative for cough and shortness of breath  Cardiovascular: Positive for leg swelling  Negative for palpitations  Gastrointestinal: Positive for nausea  Negative for constipation, diarrhea and vomiting  Endocrine: Positive for polydipsia and polyuria  Musculoskeletal: Negative for gait problem  Skin: Negative for wound  Psychiatric/Behavioral: Positive for sleep disturbance  The patient is nervous/anxious  All other systems reviewed and are negative  Historical Information   Past Medical History:   Diagnosis Date    Anxiety     Anxiety and depression     Depression     [facility] ([facility] ) 1/17/2019    Eating disorder     history of anorexia/bulemia [id number]    Fracture of fibula     R Salter I     Past Surgical History:   Procedure Laterality Date    WISDOM TOOTH EXTRACTION      WRIST SURGERY      left;  Excision of ganglion     Social History   History   Alcohol Use No     History   Drug Use No     History   Smoking Status    Never Smoker   Smokeless Tobacco    Never Used     Comment: Tobacco smoke exposure (Father smokes cigars)     Family History:   Family History   Problem Relation Age of Onset    Hypertension Mother     Migraines Mother         Headache    Diabetes type II Mother     Varicose Veins Mother     Hyperlipidemia Mother     Diabetes Mother     Arthritis Mother     Depression Mother     Cholelithiasis Father     Hypertension Father     Sarcoidosis Father         Liver    Hyperlipidemia Father     Diabetes Father     Coronary artery disease Father     Nephrolithiasis Father     Cirrhosis Father     Alcohol abuse Father     Thyroid disease Sister     Cancer Family     Diabetes Family     Hypertension Family        Meds/Allergies   Current Outpatient Prescriptions   Medication Sig Dispense Refill    ALTAVERA 0 15-30 MG-MCG per tablet       Cetirizine HCl (ZYRTEC PO) Take by mouth      insulin aspart (NOVOLOG FLEXPEN) 100 Units/mL injection pen Inject 3 Units under the skin 3 (three) times a day with meals 5 pen 0    insulin glargine (LANTUS SOLOSTAR) 100 units/mL injection pen Inject 10 Units under the skin daily at bedtime 5 pen 0    Insulin Pen Needle 29G X 5MM MISC by Does not apply route 4 (four) times a day (before meals and at bedtime) 200 each 0    montelukast (SINGULAIR) 10 mg tablet Take 10 mg by mouth every morning  5    calcium polycarbophil (FIBERCON) 625 mg tablet Take 1 tablet (625 mg total) by mouth daily (Patient not taking: Reported on 1/25/2019 ) 30 tablet 0    fluticasone (FLONASE) 50 mcg/act nasal spray 1 spray into each nostril daily      meloxicam (MOBIC) 15 mg tablet Take 1 tablet (15 mg total) by mouth daily (Patient not taking: Reported on 1/29/2019 ) 30 tablet 0     No current facility-administered medications for this visit  Allergies   Allergen Reactions    Iodides Throat Swelling     Reaction Date: 28Jul2014; Action Taken: none; Category: Allergy;     Iodine        Objective   Vitals: Blood pressure 108/80, pulse 76, height 5' 2" (1 575 m), weight (!) 533 kg (1174 lb 6 4 oz), last menstrual period 01/12/2019, not currently breastfeeding  Physical Exam   Constitutional: She is oriented to person, place, and time  She appears well-developed and well-nourished  No distress  HENT:   Head: Normocephalic and atraumatic  Mouth/Throat: No oropharyngeal exudate  Eyes: Conjunctivae and EOM are normal  No scleral icterus  Neck: Normal range of motion  Neck supple  No thyromegaly present  Cardiovascular: Normal rate, regular rhythm and normal heart sounds  No murmur heard  Pulmonary/Chest: Breath sounds normal  No respiratory distress  She has no wheezes  She has no rales  Abdominal: Soft  Bowel sounds are normal  She exhibits no distension  There is no tenderness  There is no rebound  Musculoskeletal: Normal range of motion  She exhibits no edema or deformity  Lymphadenopathy:     She has no cervical adenopathy     Neurological: She is alert and oriented to person, place, and time  Skin: Skin is warm and dry  No rash noted  No erythema  No pallor  Psychiatric: Her behavior is normal  Judgment and thought content normal    Mood dysphoric       The history was obtained from the review of the chart, patient      Lab Results:   Lab Results   Component Value Date/Time    Hemoglobin A1C 8 7 (H) 01/17/2019 05:47 PM    Hemoglobin A1C 7 1 (H) 12/15/2018 11:23 AM    WBC 6 86 01/26/2019 04:41 AM    WBC 7 60 01/25/2019 05:19 AM    WBC 7 31 01/24/2019 06:04 PM    Hemoglobin 11 8 01/26/2019 04:41 AM    Hemoglobin 12 5 01/25/2019 05:19 AM    Hemoglobin 15 6 (H) 01/24/2019 06:04 PM    Hgb, i-STAT 16 0 (H) 01/24/2019 06:17 PM    Hematocrit 35 2 01/26/2019 04:41 AM    Hematocrit 36 0 01/25/2019 05:19 AM    Hematocrit 44 4 01/24/2019 06:04 PM    Hct, i-STAT 47 (H) 01/24/2019 06:17 PM    MCV 86 01/26/2019 04:41 AM    MCV 85 01/25/2019 05:19 AM    MCV 83 01/24/2019 06:04 PM    Platelets 249 (L) 35/97/5342 04:41 AM    Platelets 680 (L) 79/24/0280 05:19 AM    Platelets 031 97/46/9231 11:39 PM    BUN 7 01/26/2019 04:41 AM    BUN 14 01/25/2019 05:19 AM    BUN 11 01/24/2019 06:04 PM    Potassium 3 3 (L) 01/26/2019 04:41 AM    Potassium 3 1 (L) 01/25/2019 05:19 AM    Potassium 3 8 01/24/2019 06:04 PM    Chloride 108 01/26/2019 04:41 AM    Chloride 107 01/25/2019 05:19 AM    Chloride 100 01/24/2019 06:04 PM    CO2 22 01/26/2019 04:41 AM    CO2 22 01/25/2019 05:19 AM    CO2 23 01/24/2019 06:04 PM    CO2, i-STAT 26 01/24/2019 06:17 PM    Creatinine 0 58 (L) 01/26/2019 04:41 AM    Creatinine 0 82 01/25/2019 05:19 AM    Creatinine 0 86 01/24/2019 06:04 PM    AST 11 01/24/2019 06:04 PM    AST 15 01/17/2019 05:47 PM    AST 48 (H) 03/06/2018 12:24 PM    ALT 21 01/24/2019 06:04 PM    ALT 21 01/17/2019 05:47 PM    ALT 17 03/06/2018 12:24 PM    Albumin 4 7 01/24/2019 06:04 PM    Albumin 4 7 01/17/2019 05:47 PM    Albumin 3 9 03/06/2018 12:24 PM    HDL 51 12/15/2018 11:23 AM    HDL, Direct 56 01/17/2019 05:47 PM    Triglycerides 74 01/17/2019 05:47 PM    Triglycerides 65 12/15/2018 11:23 AM             Portions of the record may have been created with voice recognition software  Occasional wrong word or "sound a like" substitutions may have occurred due to the inherent limitations of voice recognition software  Read the chart carefully and recognize, using context, where substitutions have occurred

## 2019-01-29 NOTE — PATIENT INSTRUCTIONS
Hypoglycemia in a Person with Diabetes   WHAT YOU NEED TO KNOW:   Hypoglycemia is a serious condition that happens when your blood glucose (sugar) level drops too low  The blood sugar level is usually too high in a person with diabetes, but the level can also drop too low  It is important to follow your diabetes management plan to keep your blood sugar level steady  DISCHARGE INSTRUCTIONS:   Call 911 for any of the following:   · You feel you are going to pass out  · You have a seizure or pass out  · You have trouble thinking clearly  Seek care immediately if:  · Your blood sugar is less than 50 mg/dL and does not respond to treatment  Contact your healthcare provider if:   · You have had symptoms of low blood sugar several times  · You have questions about the amount of insulin or diabetes medicine you are taking  · You have questions or concerns about your condition or care  Medicines:   · Insulin or diabetes medicine  help to keep your blood sugar under control  · Glucagon  may be needed if you have severe hypoglycemia  · Take your medicine as directed  Contact your healthcare provider if you think your medicine is not helping or if you have side effects  Tell him or her if you are allergic to any medicine  Keep a list of the medicines, vitamins, and herbs you take  Include the amounts, and when and why you take them  Bring the list or the pill bottles to follow-up visits  Carry your medicine list with you in case of an emergency  Follow up with your healthcare provider or specialist as directed: You may need dose changes to your insulin or oral diabetes medicine if you have hypoglycemia  Write down your questions so you remember to ask them during your visits  Manage hypoglycemia:   · Check your blood sugar level right away if you have symptoms of hypoglycemia  Hypoglycemia is usually 70 mg/dL or below   Ask your healthcare provider what blood sugar level is too low for you     · If your blood sugar level is too low, eat or drink 15 grams of fast-acting carbohydrate  Examples of this amount of fast-acting carbohydrate are 4 ounces (½ cup) of fruit juice or 4 ounces of regular soda  Other examples are 2 tablespoons of raisins or 3 to 4 glucose tablets  Check your blood sugar level 15 minutes later  If the level is still low (less than 100 mg/dL), have another 15 grams of carbohydrate  When the level returns to 100 mg/dL, eat a snack or meal that contains carbohydrates  This will help prevent another drop in blood sugar  Always carefully follow your healthcare provider's instructions on how to treat low blood sugar levels  · Always carry a source of fast-acting carbohydrate  If you have symptoms of hypoglycemia and you do not have a blood glucose meter, have a source of fast-acting carbohydrate anyway  Avoid carbohydrate foods that are high in fat  The fat content may make it take longer to increase your blood sugar level  Ask your healthcare provider if you should carry a glucagon kit  Glucagon is a medicine that is injected when you develop severe hypoglycemia and become unconscious  Check the expiration date every month and replace it before it expires  · Teach others how to help you if you have symptoms of hypoglycemia  Tell them about the symptoms of hypoglycemia  Ask them to give you a source of fast-acting carbohydrate if you cannot get it yourself  Ask them to give you a glucagon injection if you have symptoms of hypoglycemia and you become unconscious or have a seizure  Ask them to call 911   This is an emergency  Tell them never to try to make you swallow anything if you faint or have a seizure  · Wear medical alert jewelry  or carry a card that says you have diabetes  Ask where to get these items  Prevent hypoglycemia:   · Take diabetes medicine as directed  Take your medicine at the right time and in the right amount   Your healthcare provider may change your blood sugar goals if you get hypoglycemia often  · Eat regular meals and snacks  Talk to your dietitian or healthcare provider about a meal plan that is right for you  Do not skip meals  · Check your blood sugar level as directed  Ask your healthcare provider what your blood sugar levels should be before and after you eat  Ask when and how often to check your blood sugar level  You may need to check at least 3 times each day  Record your blood sugar level results and take the record with you when you see your healthcare provider  Your provider may use the record to make changes to your medicine, food, or exercise schedules  · Check your blood sugar level before you exercise  Exercise can decrease your blood sugar level  If your blood sugar level is less than 100 mg/dL, have a carbohydrate snack  Examples are 4 to 6 crackers, ½ banana, 8 ounces (1 cup) of nonfat or 1% milk, or 4 ounces (½ cup) of juice  If you will exercise for more than 1 hour, you may need to check your blood sugar level every 30 minutes  Your healthcare provider may also recommend that you check your blood sugar level after exercise  · Be aware of how alcohol affects your blood sugar level  Alcohol can cause your blood sugar level to drop for up to 12 hours after drinking  Ask your healthcare provider if alcohol is safe for you  If you drink alcohol, always have a snack or meal at the same time  Women should limit alcohol to 1 drink a day  Men should limit alcohol to 2 drinks a day  A drink of alcohol is 12 ounces of beer, 5 ounces of wine, or 1½ ounces of liquor  © 2017 [address]  Information is for End User's use only and may not be sold, redistributed or otherwise used for commercial purposes  All illustrations and images included in CareNotes® are the copyrighted property of [employer] , [employer]  or Robe Dominguez  The above information is an  only   It is not intended as medical advice for individual conditions or treatments  Talk to your doctor, nurse or pharmacist before following any medical regimen to see if it is safe and effective for you

## 2019-01-30 LAB
LEFT EYE DIABETIC RETINOPATHY: NORMAL
RIGHT EYE DIABETIC RETINOPATHY: NORMAL

## 2019-02-01 ENCOUNTER — TRANSITIONAL CARE MANAGEMENT (OUTPATIENT)
Dept: INTERNAL MEDICINE CLINIC | Facility: CLINIC | Age: 24
End: 2019-02-01

## 2019-02-05 ENCOUNTER — OFFICE VISIT (OUTPATIENT)
Dept: DIABETES SERVICES | Facility: CLINIC | Age: 24
End: 2019-02-05
Payer: COMMERCIAL

## 2019-02-05 DIAGNOSIS — E11.9 DIABETES MELLITUS, NEW ONSET (HCC): ICD-10-CM

## 2019-02-05 DIAGNOSIS — E10.65 TYPE 1 DIABETES MELLITUS WITH HYPERGLYCEMIA (HCC): Primary | ICD-10-CM

## 2019-02-05 PROCEDURE — 98960 EDU&TRN PT SELF-MGMT NQHP 1: CPT | Performed by: DIETITIAN, REGISTERED

## 2019-02-05 NOTE — PATIENT INSTRUCTIONS
1) carb counting appointment   Have boyfriend/family member come if possible so we can go over glucagon instruction as well

## 2019-02-05 NOTE — PROGRESS NOTES
Type 1 Basics    Met with with Arcelia Villa for Type 1 Basics  Topics covered during this visit included; verification of appropriate supplies, testing of blood sugar, insulin (action, timing, preparation, injection, storage), ketone testing and treatment, sick days, glucagon, and exercise precautions  Monitoring Blood Sugars    Instructions for Meter Teaching- Patient instructed in the following:  Site selection and skin preparation, Loading strips and lancet device, meter activation, obtaining blood sample, test strip and lancet disposal and recording log book entries  Patient has good understanding of material covered  Goal Blood Sugars:   Premeal , even better <110  2hr after a meal <180, even better <140  Before bed: 80 - 140  A1C <7%, even better <6 5%  Insulin Instruction   Farzana Espitia is currently taking the following diabetes medications: Novolog 4 units before meals plus scale 1 units for every 50 mg/dL over 150 mg/dL; Lantus 8 units under the skin at bedtime  Patient instructed on: Insulin type; timing of insulin; site selection and rotation; proper technique of injection and insulin administration, safe needle disposal; medication storage; side effects and precautions of insulin  Comments: Farzana Espitia has good understanding of insulin usage and demonstrated use of injection technique in the office  Patient instruction completed on Hypoglycemia: definition/risk, causes/symptoms, treatment, prevention of hypoglycemia, when to notify physician and EMS  Comments: Farzana Espitia has good understanding of hypoglycemia and it's treatment  Diabetes Education Record: Farzana Espitia was given the following education material: Fast 15 List, Hypoglycemia Fact Sheet, Hyperglycemia Fact Sheet      Ketones:    Patient instructed on: pathophysiology of ketone development, signs/symptoms, testing procedure, treatment, signs of dehydration, when to contact doctor, test strip expiration after opening  Sick days:    Patient instructed on: continuing to take insulin, drinking carb containing fluids if not eating, having both sugar free liquids and carbs on hand, ketone testing frequency, blood sugar testing frequency, over the counter medications that elevate BG  Glucagon teaching    Farzana Espitia does not currently have a glucagon kit  Discussed the use of glucagon, reason for use, preparation and administration of injection, and post injection recommendations  I demonstrated glucagon use with demo kit in office  Farzana Espitia has good understanding of use  Suggest that Farzana Espitia gets a glucagon kit to keep for emergencies  Gerardo Cedillo to notify the doctor if a severe low requiring glucagon is needed  She did not have anyone accompanying her at today's visit  Therefore, we will review glucagon again when she is accompanied bu support person when she returns for carb counting appointment  Exercise:    Patient instructed on: Testing blood sugar before, during, and after exercise  Eating before vigorous exercise, adequate hydration, possibility of reducing insulin for planned exercise pending doctor recommendations, wearing diabetes ID, not injecting insulin into body part that is being exercised, and avoiding exercise if ketones present or BG over 240 mg/dL  Patient response to instruction  Comprehension: good  Motivation: good  Expected Compliance: good  Readiness: action  Barriers to Learning: none known     Begin Time: 1:00 pm  End Time: 2:18 pm  Referring Provider: Corey Cardona MD    Thank you for referring your patient to [facility], it was a pleasure working with them today  Please feel free to call with any questions or concerns      Roel Colmenares, [address] [place] [address] [address] [id number]

## 2019-02-06 ENCOUNTER — TELEPHONE (OUTPATIENT)
Dept: DIABETES SERVICES | Facility: CLINIC | Age: 24
End: 2019-02-06

## 2019-02-06 ENCOUNTER — OFFICE VISIT (OUTPATIENT)
Dept: DIABETES SERVICES | Facility: CLINIC | Age: 24
End: 2019-02-06
Payer: COMMERCIAL

## 2019-02-06 DIAGNOSIS — E10.65 TYPE 1 DIABETES MELLITUS WITH HYPERGLYCEMIA (HCC): Primary | ICD-10-CM

## 2019-02-06 PROCEDURE — 98960 EDU&TRN PT SELF-MGMT NQHP 1: CPT | Performed by: DIETITIAN, REGISTERED

## 2019-02-06 NOTE — TELEPHONE ENCOUNTER
Please call in glucagon prescription  She would also like prescription for Luda 4 mm needles as she is currently prescribed 5 mm and has a phobia of injecting herself  Thank you

## 2019-02-07 ENCOUNTER — OFFICE VISIT (OUTPATIENT)
Dept: INTERNAL MEDICINE CLINIC | Facility: CLINIC | Age: 24
End: 2019-02-07
Payer: COMMERCIAL

## 2019-02-07 ENCOUNTER — TELEPHONE (OUTPATIENT)
Dept: DIABETES SERVICES | Facility: CLINIC | Age: 24
End: 2019-02-07

## 2019-02-07 ENCOUNTER — TELEPHONE (OUTPATIENT)
Dept: ENDOCRINOLOGY | Facility: CLINIC | Age: 24
End: 2019-02-07

## 2019-02-07 VITALS
OXYGEN SATURATION: 98 % | SYSTOLIC BLOOD PRESSURE: 112 MMHG | HEART RATE: 72 BPM | BODY MASS INDEX: 21.53 KG/M2 | DIASTOLIC BLOOD PRESSURE: 62 MMHG | HEIGHT: 62 IN | TEMPERATURE: 98.4 F | WEIGHT: 117 LBS

## 2019-02-07 DIAGNOSIS — R19.5 ABNORMAL STOOLS: ICD-10-CM

## 2019-02-07 DIAGNOSIS — E10.65 TYPE 1 DIABETES MELLITUS WITH HYPERGLYCEMIA (HCC): Primary | ICD-10-CM

## 2019-02-07 DIAGNOSIS — F41.8 DEPRESSION WITH ANXIETY: Primary | ICD-10-CM

## 2019-02-07 DIAGNOSIS — Z23 NEED FOR PERTUSSIS VACCINATION: ICD-10-CM

## 2019-02-07 DIAGNOSIS — Z23 NEED FOR INFLUENZA VACCINATION: ICD-10-CM

## 2019-02-07 DIAGNOSIS — E10.65 TYPE 1 DIABETES MELLITUS WITH HYPERGLYCEMIA (HCC): ICD-10-CM

## 2019-02-07 PROCEDURE — 90682 RIV4 VACC RECOMBINANT DNA IM: CPT | Performed by: INTERNAL MEDICINE

## 2019-02-07 PROCEDURE — 90472 IMMUNIZATION ADMIN EACH ADD: CPT | Performed by: INTERNAL MEDICINE

## 2019-02-07 PROCEDURE — 90471 IMMUNIZATION ADMIN: CPT | Performed by: INTERNAL MEDICINE

## 2019-02-07 PROCEDURE — 99495 TRANSJ CARE MGMT MOD F2F 14D: CPT | Performed by: INTERNAL MEDICINE

## 2019-02-07 PROCEDURE — 90715 TDAP VACCINE 7 YRS/> IM: CPT | Performed by: INTERNAL MEDICINE

## 2019-02-07 RX ORDER — PEN NEEDLE, DIABETIC 31 GX5/16"
NEEDLE, DISPOSABLE MISCELLANEOUS
Refills: 0 | COMMUNITY
Start: 2019-01-26 | End: 2019-03-21 | Stop reason: ALTCHOICE

## 2019-02-07 RX ORDER — DULOXETIN HYDROCHLORIDE 30 MG/1
CAPSULE, DELAYED RELEASE ORAL
Qty: 60 CAPSULE | Refills: 1 | Status: SHIPPED | OUTPATIENT
Start: 2019-02-07 | End: 2019-04-03

## 2019-02-07 RX ORDER — BLOOD SUGAR DIAGNOSTIC
STRIP MISCELLANEOUS
Refills: 0 | COMMUNITY
Start: 2019-01-26 | End: 2019-02-14 | Stop reason: SDUPTHER

## 2019-02-07 RX ORDER — LANCETS 33 GAUGE
EACH MISCELLANEOUS
Refills: 0 | COMMUNITY
Start: 2019-01-26 | End: 2019-02-14 | Stop reason: SDUPTHER

## 2019-02-07 RX ORDER — BLOOD-GLUCOSE METER
EACH MISCELLANEOUS
Refills: 0 | COMMUNITY
Start: 2019-01-26 | End: 2021-11-02

## 2019-02-07 NOTE — ASSESSMENT & PLAN NOTE
Patient reports loose, greasy stools  She has no abdominal pain, no blood in her stool  I suggested that this may be due to the insulin  We will be repeating blood work prior to her follow-up in a month, will look for any abnormalities on her metabolic panel that may indicate alternate cause  For the time being we will continue to monitor  She is encouraged to call the office if any of her symptoms worsen

## 2019-02-07 NOTE — ASSESSMENT & PLAN NOTE
Patient is struggling with anger and depression as well as anxiety regarding her new diagnosis  She does have a history of depression and anxiety which have not recently been treated  She states she has tried multiple medications in the past and does not recall which she has taken or why she did not tolerate them  She does think that she recalls some of the medications interacting with her birth control  I have referred her to psych per recommendations given in the hospital   In the meantime, I have started her on Cymbalta  She will start with 30 mg daily x1 week and if tolerated, will increase to 60 mg daily  Side effects reviewed  Patient will return for follow-up in 1 month

## 2019-02-07 NOTE — TELEPHONE ENCOUNTER
Patient interested in diagnostic Dexcom  If you agree that this would be appropriate for her at this time, please complete referral  Thank you

## 2019-02-07 NOTE — ASSESSMENT & PLAN NOTE
Lab Results   Component Value Date    HGBA1C 8 7 (H) 01/17/2019       No results for input(s): POCGLU in the last 72 hours  Blood Sugar Average: Last 72 hrs:   patient to continue with home insulin regimen as prescribed by Endocrinology, currently taking Lantus 8 units at bedtime with sliding scale insulin with meals:  4 units plus additional based on carbs  Patient will be returning for follow-up with a nutritionist at the Diabetes Education program as well as follow up with Endocrinology in 1 month  Patients boyfriend is helping her with her sugar checks as well as her insulin injections

## 2019-02-07 NOTE — PROGRESS NOTES
Assessment/Plan:    Type 1 diabetes mellitus with hyperglycemia (HCC)  Lab Results   Component Value Date    HGBA1C 8 7 (H) 01/17/2019       No results for input(s): POCGLU in the last 72 hours  Blood Sugar Average: Last 72 hrs:   patient to continue with home insulin regimen as prescribed by Endocrinology, currently taking Lantus 8 units at bedtime with sliding scale insulin with meals:  4 units plus additional based on carbs  Patient will be returning for follow-up with a nutritionist at the Diabetes Education program as well as follow up with Endocrinology in 1 month  Patients boyfriend is helping her with her sugar checks as well as her insulin injections  Depression with anxiety  Patient is struggling with anger and depression as well as anxiety regarding her new diagnosis  She does have a history of depression and anxiety which have not recently been treated  She states she has tried multiple medications in the past and does not recall which she has taken or why she did not tolerate them  She does think that she recalls some of the medications interacting with her birth control  I have referred her to psych per recommendations given in the hospital   In the meantime, I have started her on Cymbalta  She will start with 30 mg daily x1 week and if tolerated, will increase to 60 mg daily  Side effects reviewed  Patient will return for follow-up in 1 month  Abnormal stools  Patient reports loose, greasy stools  She has no abdominal pain, no blood in her stool  I suggested that this may be due to the insulin  We will be repeating blood work prior to her follow-up in a month, will look for any abnormalities on her metabolic panel that may indicate alternate cause  For the time being we will continue to monitor  She is encouraged to call the office if any of her symptoms worsen         Diagnoses and all orders for this visit:    Depression with anxiety  -     DULoxetine (CYMBALTA) 30 mg delayed release capsule; For the first 7 days, take 1 tablet daily; then increase to 2 tablets daily  -     Ambulatory referral to Psychiatry; Future    Need for pertussis vaccination  -     TDAP VACCINE GREATER THAN OR EQUAL TO 6YO IM    Need for influenza vaccination  -     PREFERRED: influenza vaccine, [id number], quadrivalent, recombinant, PF, 0 5 mL (FLUBLOK)    Type 1 diabetes mellitus with hyperglycemia ([facility] [address] )  -     Comprehensive metabolic panel; Future  -     CBC and differential; Future    Abnormal stools    Other orders  -     Blood Glucose Monitoring Suppl (ONETOUCH VERIO) w/Device KIT; Use as directed  -     ONETOUCH VERIO test strip; USE TO TEST 4 TIMES A DAY  -     B-D UF III MINI PEN NEEDLES 31G X 5 MM MISC; USE 4 TIMES A DAY (BEFORE MEALS AND AT BEDTIME)  -     ONETOUCH DELICA LANCETS 38J MISC; Use as directed          Subjective:      Patient ID: Tosin Mcdonald is a 27 y o  female  Pt is a 27y o  year old female who is here for TCM visit following hospitalization at [facility] from 1/24-1/26 with discharge to Home   Admission diagnosis was diabetes mellitus type 1  Patient was diagnosed with diabetes in December with suspicion of type 1  We were in the process of finalizing diagnosis to developed a treatment plan because patient is terrified of needles and was very resistant to the idea of starting her on insulin  Patient was sent to the Emergency in for evaluation after being seen in the office on 01/24 with increasing blood sugars at home and reported ketones in her urine, with symptoms of fatigue and weakness, polyuria and polydipsia  She was treated in the hospital with an insulin drip and transition to subcu insulin before being discharged home  Her diagnosis has been confirmed as type 1 diabetes mellitus  Discharge medications reviewed yes  She has been working with Endocrinology and the diabetes educator regarding nutrition and carb counting    Patient is presently doing Lantus 8 units in the evenings with sliding scale insulin with meals  Patient is not able to check her own blood sugar or give herself her insulin injections and her boyfriend is doing both of these for her  Pt is experiencing symptoms of depressed mood and anger regarding her new diagnosis  She says that the doctors in the hospital were very concerned about her emotional state and recommended that she be referred for psychotherapy  Patient does have a history of depression that has not been treated recently  She is also reporting loose stool, hair loss, belching and flatulence               The following portions of the patient's history were reviewed and updated as appropriate: allergies, current medications, past family history, past medical history, past social history, past surgical history and problem list     Review of Systems   Constitutional: Negative for activity change, appetite change, chills, fatigue, fever and unexpected weight change  Eyes: Negative for visual disturbance  Respiratory: Negative for cough, chest tightness and shortness of breath  Cardiovascular: Negative for chest pain, palpitations and leg swelling  Gastrointestinal: Negative for abdominal distention, abdominal pain, anal bleeding, constipation, diarrhea, nausea, rectal pain and vomiting  Genitourinary: Negative for dysuria and frequency  Musculoskeletal: Negative for arthralgias and myalgias  Neurological: Negative for dizziness, weakness, numbness and headaches  Psychiatric/Behavioral: Positive for agitation and dysphoric mood  Negative for self-injury, sleep disturbance and suicidal ideas  The patient is not nervous/anxious  Objective:      /62   Pulse 72   Temp 98 4 °F (36 9 °C)   Ht 5' 2" (1 575 m)   Wt 53 1 kg (117 lb)   LMP 01/12/2019 (Approximate)   SpO2 98%   BMI 21 40 kg/m²          Physical Exam   Constitutional: She is oriented to person, place, and time   Vital signs are normal  She appears well-developed and well-nourished  She is cooperative  HENT:   Right Ear: Hearing, tympanic membrane, external ear and ear canal normal    Left Ear: Hearing, tympanic membrane, external ear and ear canal normal    Nose: Nose normal  No mucosal edema  Mouth/Throat: Uvula is midline, oropharynx is clear and moist and mucous membranes are normal    Eyes: Pupils are equal, round, and reactive to light  Conjunctivae and lids are normal    Neck: Normal range of motion  No JVD present  Carotid bruit is not present  No thyromegaly present  Cardiovascular: Normal rate, regular rhythm, normal heart sounds and intact distal pulses  No murmur heard  Pulmonary/Chest: Effort normal and breath sounds normal  No respiratory distress  Abdominal: Soft  Normal appearance and bowel sounds are normal  There is no tenderness  Musculoskeletal: Normal range of motion  She exhibits no edema  Lymphadenopathy:     She has no cervical adenopathy  Neurological: She is alert and oriented to person, place, and time  She has normal strength  Skin: Skin is warm, dry and intact  Psychiatric: Her speech is normal and behavior is normal  Judgment and thought content normal  Her mood appears anxious  Cognition and memory are normal  She exhibits a depressed mood  Vitals reviewed  TCM Call (since 1/7/2019)     Date and time call was made  1/28/2019  9:36 AM    Hospital care reviewed  Records reviewed    Patient was hospitialized at  [facility]    Date of Admission  01/24/19    Date of discharge  01/26/19    Diagnosis  Diabetes Mellitus    Disposition  Home      TCM Call (since 1/7/2019)     Scheduled for follow up?   Yes    I have advised the patient to call PCP with any new or worsening symptoms  Jenaro Stout    Counseling  Patient    Comments  Patient appointment scheduled for 1/7/19

## 2019-02-08 DIAGNOSIS — E10.65 TYPE 1 DIABETES MELLITUS WITH HYPERGLYCEMIA (HCC): Primary | ICD-10-CM

## 2019-02-09 LAB — ISLET CELL512 AB SER-ACNC: >120 U/ML

## 2019-02-11 ENCOUNTER — TELEPHONE (OUTPATIENT)
Dept: ENDOCRINOLOGY | Facility: CLINIC | Age: 24
End: 2019-02-11

## 2019-02-11 ENCOUNTER — OFFICE VISIT (OUTPATIENT)
Dept: DIABETES SERVICES | Facility: CLINIC | Age: 24
End: 2019-02-11
Payer: COMMERCIAL

## 2019-02-11 VITALS — WEIGHT: 114.6 LBS | BODY MASS INDEX: 20.96 KG/M2

## 2019-02-11 DIAGNOSIS — E10.65 TYPE 1 DIABETES MELLITUS WITH HYPERGLYCEMIA (HCC): Primary | ICD-10-CM

## 2019-02-11 PROCEDURE — 97802 MEDICAL NUTRITION INDIV IN: CPT | Performed by: DIETITIAN, REGISTERED

## 2019-02-11 NOTE — PROGRESS NOTES
Medical Nutrition Therapy        Assessment    Visit Type: Initial visit  Chief complaint/Medical Diagnosis/reason for visit E10 67 (T1DM with hyperglycemia)    HPI Jd Rivas was seen for MNT today  Her boyfriend was with her at the visit  Patient reports a 12 pound unexplained weight loss prior to diagnosis  Weight is starting to come back on  Patient states, "I have high cholesterol, too  The doctors can't explain why it's high except that my body is out of wack  I don't want to be a sick young person " Problems identified in food recall include excess fat and sodium coming from frequent dining out at fast food establishments, low intake of plant based foods, whole grains and low fat dairy and general lack of balance  Provided patient with an 1800 calorie meal plan to assist with consistency, balance and portion control  Advised patient to include 45-60 grams of CHO per meal and 15 grams per snack to assist with glycemic control  Suggested keeping lean protein intake to 8 ounces a day and fat to 5 servings daily to assist with lipid management  Jd Rivas was encouraged to avoid fast food and begin preparing healthy meals at home to help decrease saturated fat intake  Patient agreed to keep daily food logs  Follow-up TBD  RD will remain available for further dietary questions/concerns  Ht Readings from Last 1 Encounters:   02/07/19 5' 2" (1 575 m)     Wt Readings from Last 3 Encounters:   02/11/19 52 kg (114 lb 9 6 oz)   02/07/19 53 1 kg (117 lb)   01/29/19 (!) 533 kg (1174 lb 6 4 oz)     Weight Change: Yes Lost 12 pounds in 3-4 weeks (was 122-123) down to 110  Now weight coming back up  Barriers to Learning: no barriers    Do you follow any special diet presently?: Yes - recently keeping carbs low    Who shops: mother and family  Who cooks: mother     Food Log: Completed via the method of food recall    Breakfast:8:00-10:30AM 1 cup Honey Nut Cheerios 1/4 cup Fair Life milk, occasionally 1 tangelo or small apple; prior to diagnosis DD sausage egg and cheese on a croissant or 1 5 cups oatmeal or 1 5 cup cream of wheat (made with butter, sugar cinnamon and milk); water  Morning Snack:10:00-12:00PM small piece of fruit if did not have it at breakfast; before diagnosis candy or chips; water  Lunch:12:00-2:00PM out most days: fast food [employer] soup no bread or Venkata noodles or a chick-fercho-A breaded chicken sandwich on 1/2 bun and a few Western Aileen fries, water  Afternoon Snack: none  Dinner:6:00-8:00PM home: oily or salty food (Persian food fried meats and oil in rice) or boyfriend cooks healthier food for her ie chicken tacos OR out 1 small slices pizza and wings OR a [employer] ham and swiss on country rustis bread and apple, water  Evening Snack:9:00-10:00PM popcorn or pretzels (one serving) and fruit (before was having large portions  Beverages: water  Eating out/Take out: consumes fast food 5-10 times or more a week  Exercise nothing beyond daily activities secondary to wants to straighten out BG first    Calorie needs 1800 kcals/day     Carbs: 45-60g/meal, 15g/snack     Fat: 5 servings/day    Protein:8 ounces/day    Nutrition Diagnosis:  Food and nutrition related knowledge deficit  related to Lack of prior exposure to accurate nutrition related information as evidenced by No prior knowledge of need for food and nutrition related recommendations    Intervention: plate method, reduced fat intake, behavior modification strategies, carbohydrate counting, increased plant based foods, meal planning, individualized meal plan and food diary     Treatment Goals: Patient will monitor fat intake and Patient will count carbohydrates    Monitoring and evaluation:    Term code indicator  FH 1 6 3 Carbohydrate Intake Criteria: 45 grams of carbohydrate per meal and 15 grams per between meal snack    Term code indicator  FH 1 6 1 Fat and Cholesterol Intake Criteria:  Limit added fat to 5 servings daily and limit lean protein to 8 ounce a day; make low fat food choice; limit fast food intake to monthly  Materials Provided: Portions Booklet, individualized meal plan    Patients Response to Instruction:  Amanda Comment  Expected Compliancegood    Start- Stop: 2:15-3:30  Total Minutes: 75 Minutes  Group or Individual Instruction: MNT-I  Other: Dr Masters     Thank you for coming to the [facility] for education today  Please feel free to call with any questions or concerns      Radha Albarado  [address] [facility] [id number]

## 2019-02-11 NOTE — TELEPHONE ENCOUNTER
Patient would like a call back to go over her low sugars  She said that yesterday they dropped to 79 and she was feeling shaky and sweaty  She said that the doctor told her to call and report any lows she may be having  Her number is [phone]    Thank you

## 2019-02-11 NOTE — PATIENT INSTRUCTIONS
1   45 grams of carbohydrate per meal and 15 grams per between meal snack  2   Limit added fat to 5 servings daily and limit lean protein to 8 ounce a day  3   Make low fat food choices

## 2019-02-11 NOTE — TELEPHONE ENCOUNTER
Spoke with patient and this is her first low blood sugar that she did treat and came up to 108 after 15mins   Patient also emailed her readings

## 2019-02-12 ENCOUNTER — TELEPHONE (OUTPATIENT)
Dept: ENDOCRINOLOGY | Facility: CLINIC | Age: 24
End: 2019-02-12

## 2019-02-14 DIAGNOSIS — E10.65 TYPE 1 DIABETES MELLITUS WITH HYPERGLYCEMIA (HCC): Primary | ICD-10-CM

## 2019-02-14 RX ORDER — BLOOD SUGAR DIAGNOSTIC
STRIP MISCELLANEOUS
Qty: 600 EACH | Refills: 0 | Status: SHIPPED | OUTPATIENT
Start: 2019-02-14 | End: 2019-05-16 | Stop reason: SDUPTHER

## 2019-02-14 RX ORDER — LANCETS 33 GAUGE
EACH MISCELLANEOUS
Qty: 600 EACH | Refills: 0 | Status: SHIPPED | OUTPATIENT
Start: 2019-02-14 | End: 2019-06-10 | Stop reason: SDUPTHER

## 2019-02-26 ENCOUNTER — TELEPHONE (OUTPATIENT)
Dept: OTHER | Facility: OTHER | Age: 24
End: 2019-02-26

## 2019-02-26 PROCEDURE — 99285 EMERGENCY DEPT VISIT HI MDM: CPT

## 2019-02-27 ENCOUNTER — HOSPITAL ENCOUNTER (EMERGENCY)
Facility: HOSPITAL | Age: 24
Discharge: HOME | End: 2019-02-27
Attending: EMERGENCY MEDICINE | Admitting: EMERGENCY MEDICINE
Payer: COMMERCIAL

## 2019-02-27 VITALS
HEART RATE: 80 BPM | RESPIRATION RATE: 17 BRPM | SYSTOLIC BLOOD PRESSURE: 128 MMHG | OXYGEN SATURATION: 99 % | DIASTOLIC BLOOD PRESSURE: 76 MMHG | BODY MASS INDEX: 20.49 KG/M2 | WEIGHT: 112 LBS | TEMPERATURE: 98.7 F

## 2019-02-27 DIAGNOSIS — E16.2 HYPOGLYCEMIA: Primary | ICD-10-CM

## 2019-02-27 LAB
ALBUMIN SERPL BCP-MCNC: 4.1 G/DL (ref 3.5–5)
ALP SERPL-CCNC: 61 U/L (ref 46–116)
ALT SERPL W P-5'-P-CCNC: 18 U/L (ref 12–78)
ANION GAP SERPL CALCULATED.3IONS-SCNC: 8 MMOL/L (ref 4–13)
AST SERPL W P-5'-P-CCNC: 14 U/L (ref 5–45)
BACTERIA UR QL AUTO: NORMAL /HPF
BASOPHILS # BLD AUTO: 0.03 THOUSANDS/ΜL (ref 0–0.1)
BASOPHILS NFR BLD AUTO: 0 % (ref 0–1)
BILIRUB SERPL-MCNC: 0.83 MG/DL (ref 0.2–1)
BILIRUB UR QL STRIP: NEGATIVE
BUN SERPL-MCNC: 10 MG/DL (ref 5–25)
CALCIUM SERPL-MCNC: 8.7 MG/DL (ref 8.3–10.1)
CHLORIDE SERPL-SCNC: 106 MMOL/L (ref 100–108)
CLARITY UR: CLEAR
CO2 SERPL-SCNC: 26 MMOL/L (ref 21–32)
COLOR UR: YELLOW
CREAT SERPL-MCNC: 0.75 MG/DL (ref 0.6–1.3)
EOSINOPHIL # BLD AUTO: 0.11 THOUSAND/ΜL (ref 0–0.61)
EOSINOPHIL NFR BLD AUTO: 1 % (ref 0–6)
ERYTHROCYTE [DISTWIDTH] IN BLOOD BY AUTOMATED COUNT: 12.2 % (ref 11.6–15.1)
EST. AVERAGE GLUCOSE BLD GHB EST-MCNC: 194 MG/DL
EXT PREG TEST URINE: NEGATIVE
GFR SERPL CREATININE-BSD FRML MDRD: 114 ML/MIN/1.73SQ M
GLUCOSE SERPL-MCNC: 88 MG/DL (ref 65–140)
GLUCOSE SERPL-MCNC: 91 MG/DL (ref 65–140)
GLUCOSE UR STRIP-MCNC: NEGATIVE MG/DL
HBA1C MFR BLD: 8.4 % (ref 4.2–6.3)
HCT VFR BLD AUTO: 39.4 % (ref 34.8–46.1)
HGB BLD-MCNC: 12.8 G/DL (ref 11.5–15.4)
HGB UR QL STRIP.AUTO: ABNORMAL
HYALINE CASTS #/AREA URNS LPF: NORMAL /LPF
IMM GRANULOCYTES # BLD AUTO: 0.03 THOUSAND/UL (ref 0–0.2)
IMM GRANULOCYTES NFR BLD AUTO: 0 % (ref 0–2)
KETONES UR STRIP-MCNC: NEGATIVE MG/DL
LEUKOCYTE ESTERASE UR QL STRIP: NEGATIVE
LIPASE SERPL-CCNC: 152 U/L (ref 73–393)
LYMPHOCYTES # BLD AUTO: 3.84 THOUSANDS/ΜL (ref 0.6–4.47)
LYMPHOCYTES NFR BLD AUTO: 45 % (ref 14–44)
MCH RBC QN AUTO: 28.6 PG (ref 26.8–34.3)
MCHC RBC AUTO-ENTMCNC: 32.5 G/DL (ref 31.4–37.4)
MCV RBC AUTO: 88 FL (ref 82–98)
MONOCYTES # BLD AUTO: 0.7 THOUSAND/ΜL (ref 0.17–1.22)
MONOCYTES NFR BLD AUTO: 8 % (ref 4–12)
NEUTROPHILS # BLD AUTO: 3.78 THOUSANDS/ΜL (ref 1.85–7.62)
NEUTS SEG NFR BLD AUTO: 46 % (ref 43–75)
NITRITE UR QL STRIP: NEGATIVE
NON-SQ EPI CELLS URNS QL MICRO: NORMAL /HPF
NRBC BLD AUTO-RTO: 0 /100 WBCS
PH UR STRIP.AUTO: 6 [PH] (ref 4.5–8)
PLATELET # BLD AUTO: 185 THOUSANDS/UL (ref 149–390)
PMV BLD AUTO: 11.9 FL (ref 8.9–12.7)
POTASSIUM SERPL-SCNC: 3.4 MMOL/L (ref 3.5–5.3)
PROT SERPL-MCNC: 7.6 G/DL (ref 6.4–8.2)
PROT UR STRIP-MCNC: NEGATIVE MG/DL
RBC # BLD AUTO: 4.47 MILLION/UL (ref 3.81–5.12)
RBC #/AREA URNS AUTO: NORMAL /HPF
SODIUM SERPL-SCNC: 140 MMOL/L (ref 136–145)
SP GR UR STRIP.AUTO: 1.01 (ref 1–1.03)
UROBILINOGEN UR QL STRIP.AUTO: 0.2 E.U./DL
WBC # BLD AUTO: 8.49 THOUSAND/UL (ref 4.31–10.16)
WBC #/AREA URNS AUTO: NORMAL /HPF

## 2019-02-27 PROCEDURE — 82948 REAGENT STRIP/BLOOD GLUCOSE: CPT

## 2019-02-27 PROCEDURE — 83036 HEMOGLOBIN GLYCOSYLATED A1C: CPT | Performed by: EMERGENCY MEDICINE

## 2019-02-27 PROCEDURE — 36415 COLL VENOUS BLD VENIPUNCTURE: CPT | Performed by: EMERGENCY MEDICINE

## 2019-02-27 PROCEDURE — 83690 ASSAY OF LIPASE: CPT | Performed by: EMERGENCY MEDICINE

## 2019-02-27 PROCEDURE — 81003 URINALYSIS AUTO W/O SCOPE: CPT

## 2019-02-27 PROCEDURE — 80053 COMPREHEN METABOLIC PANEL: CPT | Performed by: EMERGENCY MEDICINE

## 2019-02-27 PROCEDURE — 81025 URINE PREGNANCY TEST: CPT | Performed by: EMERGENCY MEDICINE

## 2019-02-27 PROCEDURE — 85025 COMPLETE CBC W/AUTO DIFF WBC: CPT | Performed by: EMERGENCY MEDICINE

## 2019-02-27 PROCEDURE — 81001 URINALYSIS AUTO W/SCOPE: CPT

## 2019-02-27 PROCEDURE — 96361 HYDRATE IV INFUSION ADD-ON: CPT

## 2019-02-27 PROCEDURE — 96374 THER/PROPH/DIAG INJ IV PUSH: CPT

## 2019-02-27 RX ORDER — ONDANSETRON 2 MG/ML
4 INJECTION INTRAMUSCULAR; INTRAVENOUS ONCE
Status: COMPLETED | OUTPATIENT
Start: 2019-02-27 | End: 2019-02-27

## 2019-02-27 RX ADMIN — ONDANSETRON 4 MG: 2 INJECTION INTRAMUSCULAR; INTRAVENOUS at 00:48

## 2019-02-27 RX ADMIN — SODIUM CHLORIDE 1000 ML: 0.9 INJECTION, SOLUTION INTRAVENOUS at 00:48

## 2019-02-27 NOTE — DISCHARGE INSTRUCTIONS
Hold your lantus tonight  Call your pcp tommorow  If your symptoms worsen, return to the ED immediately

## 2019-02-27 NOTE — ED PROVIDER NOTES
History  Chief Complaint   Patient presents with    Hypoglycemia - Symptomatic     Pt states her BS has been out of control for the past 2 weeks  Pt denies change in her diet or medications  Pt states she felt shakey and had nausea  82-year-old female presents to the emergency department for evaluation of hypoglycemia  Patient was recently diagnosed with type 1 diabetes on January 26  Patient states that for the past couple of weeks she has been noticing that her blood sugars have been low  The lowest her sugar has gotten was  60 and she says  She contact her PCP a couple weeks who recommend the patient continue to monitor for symptoms  She states that at around 9pm she checked her glucometer and it said  that her blood sugar was 62 despite eating a large portion of noodles at 7  She states she has been compliant with her insulin  Tonight, patient contacted her PCPs answering service who recommended the patient  go to the emergency department for further evaluation  Patient did not take her lantus tonight  Denies any recent illness  Denies any fever chest pain shortness of breath  She does complain of epigastric and right upper quadrant abdominal pain the  She denies any previous surgical history  She does state that she feels nauseous and get shakes  Otherwise, patient denies having any other complaints  Ten systems reviewed except as noted in HPI  Prior to Admission Medications   Prescriptions Last Dose Informant Patient Reported? Taking?    ALTAVERA 0 15-30 MG-MCG per tablet 2/26/2019 at Unknown time  Yes Yes   B-D UF III MINI PEN NEEDLES 31G X 5 MM MISC   Yes No   Sig: USE 4 TIMES A DAY (BEFORE MEALS AND AT BEDTIME)   Blood Glucose Monitoring Suppl (Sander Franklin) w/Device KIT   Yes No   Sig: Use as directed   Cetirizine HCl (ZYRTEC PO) 2/26/2019 at Unknown time  Yes Yes   Sig: Take by mouth   DULoxetine (CYMBALTA) 30 mg delayed release capsule   No Yes   Sig: For the first 7 days, take 1 tablet daily; then increase to 2 tablets daily  Insulin Pen Needle (BD PEN NEEDLE NASIM U/F) 32G X 4 MM MISC   No No   Sig: by Does not apply route 4 (four) times a day for 180 days   Insulin Pen Needle 29G X 5MM MISC   No No   Sig: by Does not apply route 4 (four) times a day (before meals and at bedtime)   ONETOUCH DELICA LANCETS 89A MISC   No No   Sig: Patient test 6 times daily   ONETOUCH VERIO test strip   No No   Sig: Test six times a day   glucagon (GLUCAGON EMERGENCY) 1 MG injection   No Yes   Sig: Inject 1 mg under the skin once as needed for low blood sugar for up to 2 doses   insulin aspart (NovoLOG) 100 Units/mL injection pen 2/26/2019 at Unknown time  Yes Yes   Sig: Inject 4 Units under the skin 3 (three) times a day with meals   insulin glargine (LANTUS SOLOSTAR) 100 units/mL injection pen Past Week at Unknown time  Yes Yes   Sig: Inject 8 Units under the skin daily    montelukast (SINGULAIR) 10 mg tablet 2/26/2019 at Unknown time  Yes Yes   Sig: Take 10 mg by mouth every morning      Facility-Administered Medications: None       Past Medical History:   Diagnosis Date    Anxiety     Anxiety and depression     Depression     [facility]    Eating disorder     history of anorexia/bulemia [id number]    Fracture of fibula     R Salter I       Past Surgical History:   Procedure Laterality Date    WISDOM TOOTH EXTRACTION      WRIST SURGERY      left;  Excision of ganglion       Family History   Problem Relation Age of Onset    Hypertension Mother    Kecia Riis Migraines Mother         Headache    Diabetes type II Mother     Varicose Veins Mother     Hyperlipidemia Mother    Kecia Riis Diabetes Mother    Kecia Riis Arthritis Mother     Depression Mother     Cholelithiasis Father     Hypertension Father     Sarcoidosis Father         Liver    Hyperlipidemia Father     Diabetes Father     Coronary artery disease Father     Nephrolithiasis Father     Cirrhosis Father     Alcohol abuse Father     Thyroid disease Sister     Cancer Family     Diabetes Family     Hypertension Family      I have reviewed and agree with the history as documented  Social History     Tobacco Use    Smoking status: Never Smoker    Smokeless tobacco: Never Used    Tobacco comment: Tobacco smoke exposure (Father smokes cigars)   Substance Use Topics    Alcohol use: No    Drug use: No        Review of Systems   Constitutional: Negative for appetite change, chills, diaphoresis, fatigue and fever  HENT: Negative for congestion, ear discharge, ear pain, hearing loss, postnasal drip, rhinorrhea, sneezing and sore throat  Eyes: Negative for pain, discharge and redness  Respiratory: Negative for choking, chest tightness, shortness of breath, wheezing and stridor  Cardiovascular: Negative for chest pain and palpitations  Gastrointestinal: Positive for abdominal pain and nausea  Negative for abdominal distention, blood in stool, constipation, diarrhea and vomiting  Genitourinary: Negative for decreased urine volume, difficulty urinating, dysuria, flank pain, frequency and hematuria  Musculoskeletal: Negative for arthralgias, gait problem, joint swelling and neck pain  Skin: Negative for color change, pallor and rash  Allergic/Immunologic: Negative for environmental allergies, food allergies and immunocompromised state  Neurological: Positive for tremors  Negative for dizziness, seizures, weakness, light-headedness, numbness and headaches  Hematological: Negative for adenopathy  Does not bruise/bleed easily  Psychiatric/Behavioral: Negative for agitation and behavioral problems         Physical Exam  ED Triage Vitals   Temperature Pulse Respirations Blood Pressure SpO2   02/27/19 0014 02/27/19 0014 02/27/19 0014 02/27/19 0014 02/27/19 0014   98 7 °F (37 1 °C) 79 16 134/79 99 %      Temp Source Heart Rate Source Patient Position - Orthostatic VS BP Location FiO2 (%)   02/27/19 0014 02/27/19 0155 02/27/19 0155 02/27/19 0155 --   Oral Monitor Lying Right arm       Pain Score       --                  Orthostatic Vital Signs  Vitals:    02/27/19 0014 02/27/19 0155   BP: 134/79 128/76   Pulse: 79 80   Patient Position - Orthostatic VS:  Lying       Physical Exam   Constitutional: She is oriented to person, place, and time  She appears well-developed and well-nourished  HENT:   Head: Normocephalic and atraumatic  Nose: Nose normal    Mouth/Throat: Oropharynx is clear and moist    Eyes: Pupils are equal, round, and reactive to light  Conjunctivae and EOM are normal    Neck: Normal range of motion  Neck supple  Cardiovascular: Normal rate, regular rhythm and normal heart sounds  Exam reveals no gallop and no friction rub  No murmur heard  Pulmonary/Chest: Effort normal and breath sounds normal    Abdominal: Soft  Bowel sounds are normal  She exhibits no distension  There is tenderness  There is no rebound and no guarding  RUQ pain, epigastric   Musculoskeletal: Normal range of motion  Neurological: She is alert and oriented to person, place, and time  She has normal reflexes  Skin: Skin is warm and dry  No erythema  No pallor  Psychiatric: She has a normal mood and affect  Her behavior is normal    Nursing note and vitals reviewed        ED Medications  Medications   ondansetron (ZOFRAN) injection 4 mg (4 mg Intravenous Given 2/27/19 0048)   sodium chloride 0 9 % bolus 1,000 mL (0 mL Intravenous Stopped 2/27/19 0155)       Diagnostic Studies  Results Reviewed     Procedure Component Value Units Date/Time    Hemoglobin A1C [[id number]]  (Abnormal) Collected:  02/27/19 0106    Lab Status:  Final result Specimen:  Blood from Arm, Left Updated:  02/27/19 0159     Hemoglobin A1C 8 4 %       mg/dl     Comprehensive metabolic panel [[id number]]  (Abnormal) Collected:  02/27/19 0048    Lab Status:  Final result Specimen:  Blood from Arm, Left Updated:  02/27/19 0120     Sodium 140 mmol/L Potassium 3 4 mmol/L      Chloride 106 mmol/L      CO2 26 mmol/L      ANION GAP 8 mmol/L      BUN 10 mg/dL      Creatinine 0 75 mg/dL      Glucose 91 mg/dL      Calcium 8 7 mg/dL      AST 14 U/L      ALT 18 U/L      Alkaline Phosphatase 61 U/L      Total Protein 7 6 g/dL      Albumin 4 1 g/dL      Total Bilirubin 0 83 mg/dL      eGFR 114 ml/min/1 73sq m     Narrative:       National Kidney Disease Education Program recommendations are as follows:  GFR calculation is accurate only with a steady state creatinine  Chronic Kidney disease less than 60 ml/min/1 73 sq  meters  Kidney failure less than 15 ml/min/1 73 sq  meters      Lipase [[id number]]  (Normal) Collected:  02/27/19 0048    Lab Status:  Final result Specimen:  Blood from Arm, Left Updated:  02/27/19 0120     Lipase 152 u/L     Urine Microscopic [[id number]]  (Normal) Collected:  02/27/19 0057    Lab Status:  Final result Specimen:  Urine, Clean Catch Updated:  02/27/19 0109     RBC, UA None Seen /hpf      WBC, UA None Seen /hpf      Epithelial Cells None Seen /hpf      Bacteria, UA None Seen /hpf      Hyaline Casts, UA None Seen /lpf     CBC and differential [[id number]]  (Abnormal) Collected:  02/27/19 0048    Lab Status:  Final result Specimen:  Blood from Arm, Left Updated:  02/27/19 0104     WBC 8 49 Thousand/uL      RBC 4 47 Million/uL      Hemoglobin 12 8 g/dL      Hematocrit 39 4 %      MCV 88 fL      MCH 28 6 pg      MCHC 32 5 g/dL      RDW 12 2 %      MPV 11 9 fL      Platelets 668 Thousands/uL      nRBC 0 /100 WBCs      Neutrophils Relative 46 %      Immat GRANS % 0 %      Lymphocytes Relative 45 %      Monocytes Relative 8 %      Eosinophils Relative 1 %      Basophils Relative 0 %      Neutrophils Absolute 3 78 Thousands/µL      Immature Grans Absolute 0 03 Thousand/uL      Lymphocytes Absolute 3 84 Thousands/µL      Monocytes Absolute 0 70 Thousand/µL      Eosinophils Absolute 0 11 Thousand/µL      Basophils Absolute 0 03 Thousands/µL     POCT pregnancy, urine [[id number]]  (Normal) Resulted:  02/27/19 0054    Lab Status:  Final result Updated:  02/27/19 0054     EXT PREG TEST UR (Ref: Negative) negative    ED Urine Macroscopic [[id number]]  (Abnormal) Collected:  02/27/19 0057    Lab Status:  Final result Specimen:  Urine Updated:  02/27/19 0053     Color, UA Yellow     Clarity, UA Clear     pH, UA 6 0     Leukocytes, UA Negative     Nitrite, UA Negative     Protein, UA Negative mg/dl      Glucose, UA Negative mg/dl      Ketones, UA Negative mg/dl      Urobilinogen, UA 0 2 E U /dl      Bilirubin, UA Negative     Blood, UA Trace     Specific [place], UA 1 010    Narrative:       CLINITEK RESULT    Fingerstick Glucose (POCT) [[id number]]  (Normal) Collected:  02/27/19 0021    Lab Status:  Final result Updated:  02/27/19 0024     POC Glucose 88 mg/dl                  No orders to display         Procedures  Procedures      Phone Consults  ED Phone Contact    ED Course  ED Course as of Feb 28 1919 Wed Feb 27, 2019   0146 Patient states that she feels better  MDM  Number of Diagnoses or Management Options  Hypoglycemia:   Diagnosis management comments: 51-year-old female presents emergent department for evaluation of hypoglycemia  MDM:  I will order CBC CMP lipase urinalysis urine pregnancy  Will give Zofran normal saline  Patient does have at bedside glucose of 80 a will recheck prior to patient could likely be discharged pending lab evaluation  I will then reassess patient  I reviewed all testing with the patient:   I gave oral return precautions for what to return for in addition to the written return precautions  The patient  verbalized understanding of the discharge instructions and warnings that would necessitate return to the Emergency Department  I specifically highlighted areas of special concern regarding the written and verbal discharge instructions and return precautions      All questions were answered prior to discharge  Disposition  Final diagnoses:   Hypoglycemia     Time reflects when diagnosis was documented in both MDM as applicable and the Disposition within this note     Time User Action Codes Description Comment    2/27/2019  2:01 AM Nikki Rodrigues Add [E16 2] Hypoglycemia       ED Disposition     ED Disposition Condition Date/Time Comment    Discharge Stable Wed Feb 27, 2019  2:01 AM Arcelia Villa discharge to home/self care  Follow-up Information     Follow up With Specialties Details Why [facility], [facility], Nurse Practitioner Call in 1 day  [address]  [phone]            Discharge Medication List as of 2/27/2019  2:04 AM      CONTINUE these medications which have NOT CHANGED    Details   ALTAVERA 0 15-30 MG-MCG per tablet Starting Fri 9/21/2018, Historical Med      Cetirizine HCl (ZYRTEC PO) Take by mouth, Historical Med      DULoxetine (CYMBALTA) 30 mg delayed release capsule For the first 7 days, take 1 tablet daily; then increase to 2 tablets daily  , Normal      glucagon (GLUCAGON EMERGENCY) 1 MG injection Inject 1 mg under the skin once as needed for low blood sugar for up to 2 doses, Starting Wed 2/6/2019, Normal      insulin aspart (NovoLOG) 100 Units/mL injection pen Inject 4 Units under the skin 3 (three) times a day with meals, Historical Med      insulin glargine (LANTUS SOLOSTAR) 100 units/mL injection pen Inject 8 Units under the skin daily , Historical Med      montelukast (SINGULAIR) 10 mg tablet Take 10 mg by mouth every morning, Starting Wed 1/9/2019, Historical Med      !! B-D UF III MINI PEN NEEDLES 31G X 5 MM MISC USE 4 TIMES A DAY (BEFORE MEALS AND AT BEDTIME), Historical Med      Blood Glucose Monitoring Suppl (Rocky Ko) w/Device KIT Use as directed, Starting Sat 1/26/2019, Historical Med      !!  Insulin Pen Needle (BD PEN NEEDLE NASIM U/F) 32G X 4 MM MISC by Does not apply route 4 (four) times a day for 180 days, Starting Wed 2/6/2019, Until Mon 8/5/2019, Normal      !! Insulin Pen Needle 29G X 5MM MISC by Does not apply route 4 (four) times a day (before meals and at bedtime), Starting Sat 1/26/2019, Print      ONETOUCH DELICA LANCETS 95Y MISC Patient test 6 times daily, Normal      ONETOUCH VERIO test strip Test six times a day, Normal       !! - Potential duplicate medications found  Please discuss with provider  No discharge procedures on file  ED Provider  Attending physically available and evaluated [facility]  I managed the patient along with the ED Attending      Electronically Signed by         Rogena Cogan, MD  02/28/19 9251

## 2019-02-27 NOTE — TELEPHONE ENCOUNTER
Jacquelyn Musa 1995  CONFIDENTIALTY NOTICE: This fax transmission is intended only for the addressee  It contains information that is legally privileged,  confidential or otherwise protected from use or disclosure  If you are not the intended recipient, you are strictly prohibited from reviewing,  disclosing, copying using or disseminating any of this information or taking any action in reliance on or regarding this information  If you have  received this fax in error, please notify us immediately by telephone so that we can arrange for its return to us  Page:   Call Id: [id number]  Health Call  Standard Call Report  Health Call  Patient Name: Jacquelyn Musa  Gender: Female  : 1995  Age: 27 Y 25 D  Return Phone  Number: [phone] (Cell)  Address: [address] [place] [address]  City/State/Zip: [address] [place], [place] [id number]  Practice Name: Mehul Cohen 124  [address] [facility]  Practice Charged:  Physician:  [facility] Name:  Relationship To  Patient: Self  Return Phone Number: [phone] (Cell)  Presenting Problem: "My blood sugar is 70 and I went into  hypoglycemic shock at 9 pm "  Service Type: Triage  Charged Service 1: Caryl Clark U  38  Name and  Number:  Nurse Assessment  Nurse: Paramjit Barraza RN, Katarzyna Olea Date/Time: 2019 11:34:58 PM  Type of assessment required:  ---General (Adult or Child)  Duration of Current S/S  ---Tonight  Location/Radiation  ---N/A  Temperature (F) and route:  ---Denies  Symptom Specific Meds (Dose/Time):  ---Juice @ 2100  Other S/S  ---2100 BS 72 had sweats ,shakes didn't feel well, check it now it's 70 has shakes,  sweats, not feeling right , had a dinner of noodles and bok jackeline a lg helping  Pain Scale on scale of 1-10, 10 being the worst:  ---N/A  Symptom progression:  ---worse  Intake and Output  ---WNL/WNL  Jacquelyn Musa 1995  CONFIDENTIALTY NOTICE: This fax transmission is intended only for the addressee   It contains information that is legally privileged,  confidential or otherwise protected from use or disclosure  If you are not the intended recipient, you are strictly prohibited from reviewing,  disclosing, copying using or disseminating any of this information or taking any action in reliance on or regarding this information  If you have  received this fax in error, please notify us immediately by telephone so that we can arrange for its return to us  Page: 2 of 2  Call Id: [id number]  Nurse Assessment  LMP/ Pregnancy:  ---N/A  Breastfeeding  ---No  Last Exam/Treatment:  ---2/11/2019 Type 1 DM check  Protocols  Protocol Title Nurse Date/Time  Diabetes - Low Blood Sugar ALYCIA YaoGwendbeth Fitzgerald 2/26/2019 11:42:59 PM  Question Caller Affirmed  Disp  Time Disposition Final User  2/26/2019 11:47:26 PM Go to ED Now (or PCP triage) Shay Vilchis RN, [facility]  2/26/2019 11:48:48 PM RN Triaged Yes ALYCIA Yao, [facility] Advice Given Per Protocol  GO TO ED NOW (OR PCP TRIAGE): * IF NO PCP TRIAGE: You need to be seen  [facility] to the [facility]  within the next hour  Leave as soon as you can  NOTE TO TRIAGER - DRIVING: * Another adult should drive  * If immediate  transportation is not available via car or taxi, then the patient should be instructed to call [phone]  BRING MEDICINES: * Please  bring a list of your current medicines when you go to see the doctor  * It is also a good idea to bring the pill bottles too  This will help  the doctor to make certain you are taking the right medicines and the right dose  LOW BLOOD SUGAR - TREATMENT - Eat some  (15 gms) sugar NOW  Each of the following has the right amount of sugar: * Low-fat milk (1 cup; 240 ml) * Fruit juice or non-diet soda  (1/2 cup; 120 ml) * Pre-packaged juice box (1 box) * Table sugar or honey (3 teaspoons; 15 ml) * Glucose tablets (3-4 tablets) CARE  ADVICE given per Diabetes - Low Blood Sugar (Adult) guideline    Caller Understands: Yes  Caller Disagree/Comply: Comply  PreDisposition: Unsure

## 2019-02-27 NOTE — ED ATTENDING ATTESTATION
Kyle Anton DO, saw and evaluated the patient  I have discussed the patient with the resident/non-physician practitioner and agree with the resident's/non-physician practitioner's findings, Plan of Care, and MDM as documented in the resident's/non-physician practitioner's note, except where noted  All available labs and Radiology studies were reviewed  I was present for key portions of any procedure(s) performed by the resident/non-physician practitioner and I was immediately available to provide assistance  At this point I agree with the current assessment done in the Emergency Department  I have conducted an independent evaluation of this patient a history and physical is as follows:    Patient is a 56-year-old female recently diagnosed approximately 2 months ago with type 1 diabetes mellitus started on insulin and Lantus in the evening patient complains of 2 episodes of low blood sugar where she was symptomatic with her hyperglycemia  No change in her insulin regimen since she was initiated on insulin therapy  Denies any recent illness or injury  Only notes nausea has a positive review of system otherwise negative review of systems  Patient otherwise unremarkable physical exam   Moist mucous membranes  Tolerating p o  Intake without difficulty this time  Complains of midepigastric discomfort her has otherwise benign abdomen  Bedside ultrasound is unremarkable and has a normal appearing gallbladder  Labs reviewed  Patient instructed to hold her evening Lantus dose for tonight and to call her endocrinologist in the morning for further dosing instructions  Patient understands and agrees with treatment plan        Critical Care Time  Procedures

## 2019-02-28 ENCOUNTER — OFFICE VISIT (OUTPATIENT)
Dept: INTERNAL MEDICINE CLINIC | Facility: CLINIC | Age: 24
End: 2019-02-28
Payer: COMMERCIAL

## 2019-02-28 VITALS
WEIGHT: 116 LBS | DIASTOLIC BLOOD PRESSURE: 72 MMHG | TEMPERATURE: 98.7 F | OXYGEN SATURATION: 99 % | RESPIRATION RATE: 18 BRPM | HEIGHT: 62 IN | BODY MASS INDEX: 21.35 KG/M2 | HEART RATE: 73 BPM | SYSTOLIC BLOOD PRESSURE: 114 MMHG

## 2019-02-28 DIAGNOSIS — R10.11 CHRONIC RUQ PAIN: ICD-10-CM

## 2019-02-28 DIAGNOSIS — E10.65 TYPE 1 DIABETES MELLITUS WITH HYPERGLYCEMIA (HCC): Primary | ICD-10-CM

## 2019-02-28 DIAGNOSIS — G89.29 CHRONIC RUQ PAIN: ICD-10-CM

## 2019-02-28 DIAGNOSIS — L98.9 BUMPS ON SKIN: ICD-10-CM

## 2019-02-28 DIAGNOSIS — F41.8 DEPRESSION WITH ANXIETY: ICD-10-CM

## 2019-02-28 PROBLEM — E10.9 TYPE 1 DIABETES MELLITUS WITHOUT COMPLICATION (HCC): Status: ACTIVE | Noted: 2019-01-24

## 2019-02-28 PROCEDURE — 3008F BODY MASS INDEX DOCD: CPT | Performed by: NURSE PRACTITIONER

## 2019-02-28 PROCEDURE — 99214 OFFICE O/P EST MOD 30 MIN: CPT | Performed by: NURSE PRACTITIONER

## 2019-02-28 NOTE — ASSESSMENT & PLAN NOTE
Symptoms are intermittent  Patient did have the right upper quadrant ultrasound while in the emergency room earlier this week which was unremarkable  Patient does report frequent intestinal gas  It is possible that her symptoms are secondary to gas pain  For now will continue to monitor  She will be coming back to the office for follow-up in 3 weeks and we will readdress, if her symptoms persist will discuss additional measures for further evaluation

## 2019-02-28 NOTE — ASSESSMENT & PLAN NOTE
Pt has been on cymbalta for 3 weeks  She does not notice an improvement in her symptoms at this point and she states that it makes her nauseated  She will continue on this for 3 more weeks and then will return for f/u evaluation  If it is still not improving her mood we will discuss options for changing her dose or switching drugs  Pt is also scheduled to start counseling next week

## 2019-02-28 NOTE — ASSESSMENT & PLAN NOTE
Lab Results   Component Value Date    HGBA1C 8 4 (H) 02/27/2019       Recent Labs     02/27/19  0021   POCGLU 88       Blood Sugar Average: Last 72 hrs: Insulin prescriptions are managed by endocrinology  I called her endo office and spoke with Dr Maryellen Mortimer for recommendations  Her instructions were relayed to the patient: she should keep her lantus at 8 units and decrease her lispro to 3 units with no sliding scale coverage  She should send them her logs early next week and she will keep her regular f/u visit with them on 3/6

## 2019-02-28 NOTE — PROGRESS NOTES
Assessment/Plan:    Type 1 diabetes mellitus with hyperglycemia (HCC)  Lab Results   Component Value Date    HGBA1C 8 4 (H) 02/27/2019       Recent Labs     02/27/19  0021   POCGLU 88       Blood Sugar Average: Last 72 hrs: Insulin prescriptions are managed by endocrinology  I called her endo office and spoke with Dr Nik Hooker for recommendations  Her instructions were relayed to the patient: she should keep her lantus at 8 units and decrease her lispro to 3 units with no sliding scale coverage  She should send them her logs early next week and she will keep her regular f/u visit with them on 3/6  Depression with anxiety  Pt has been on cymbalta for 3 weeks  She does not notice an improvement in her symptoms at this point and she states that it makes her nauseated  She will continue on this for 3 more weeks and then will return for f/u evaluation  If it is still not improving her mood we will discuss options for changing her dose or switching drugs  Pt is also scheduled to start counseling next week  Chronic RUQ pain  Symptoms are intermittent  Patient did have the right upper quadrant ultrasound while in the emergency room earlier this week which was unremarkable  Patient does report frequent intestinal gas  It is possible that her symptoms are secondary to gas pain  For now will continue to monitor  She will be coming back to the office for follow-up in 3 weeks and we will readdress, if her symptoms persist will discuss additional measures for further evaluation  Bumps on skin  Skin at the site where patient reports feeling bumps on her skin has no concerning findings  Recommended that she continue with moisturizer and will continue to monitor  Will reassess at her follow-up         Diagnoses and all orders for this visit:    Type 1 diabetes mellitus with hyperglycemia ([facility] [address] )    Depression with anxiety    Chronic RUQ pain    Bumps on skin          Subjective:      Patient ID: Martín Marisol Camilla Catherine is a 27 y o  female  Pt is a 27 y o  y/o female who is seen today for evaluation of episodes of hypoglycemia  She was diagnosed in January with type one diabetes and has since been taking lantus 8 units qpm with 4 units novolog with meals with additional sliding scale  She states her blood sugars started to normalize and were "normal" until about 2 weeks ago and she started noticing that her blood sugars were dropping in the 60-70 range  She becomes symptomatic of anxiety, palpitations, shakiness, and nausea  She went to the ER on 2/26 for evaluation because her sugar was 74 and she was symptomatic  She was evaluated and given IV fluids, antiemetics, and they did an ultrasound of the RUQ to r/o gall bladder disease  Her work up was negative  Her blood sugar was up to 81 when she was seen there and she was sent home with no changes to her meds and advised to f/u here  She does not see her endocrinologist until next week  The following portions of the patient's history were reviewed and updated as appropriate: allergies, current medications, past family history, past medical history, past social history, past surgical history and problem list     Review of Systems   Constitutional: Negative for activity change, appetite change, chills, fatigue, fever and unexpected weight change  HENT: Negative for hearing loss  Eyes: Negative for visual disturbance  Respiratory: Negative for cough, chest tightness and shortness of breath  Cardiovascular: Negative for chest pain, palpitations and leg swelling  Gastrointestinal: Positive for abdominal pain (Right upper quadrant intermittent) and nausea  Negative for constipation, diarrhea and vomiting  Genitourinary: Negative for dysuria and frequency  Musculoskeletal: Negative for arthralgias and myalgias  Skin: Positive for rash (Bumps on the left cheek below the eye)     Neurological: Positive for tremors (Leg tremors with episodes of hypoglycemia)  Negative for dizziness, weakness, numbness and headaches  Psychiatric/Behavioral: Positive for dysphoric mood  Negative for sleep disturbance  The patient is nervous/anxious  Objective:      /72 (BP Location: Left arm, Patient Position: Sitting, Cuff Size: Standard)   Pulse 73   Temp 98 7 °F (37 1 °C)   Resp 18   Ht 5' 2" (1 575 m)   Wt 52 6 kg (116 lb)   SpO2 99%   BMI 21 22 kg/m²          Physical Exam   Constitutional: She is oriented to person, place, and time  Vital signs are normal  She appears well-developed and well-nourished  She is cooperative  HENT:   Right Ear: Hearing normal    Left Ear: Hearing normal    Nose: Nose normal    Eyes: Conjunctivae and lids are normal    Cardiovascular: Normal rate, regular rhythm and intact distal pulses  No murmur heard  Pulmonary/Chest: Effort normal  No respiratory distress  Abdominal: Soft  Normal appearance and bowel sounds are normal    Musculoskeletal: Normal range of motion  She exhibits no edema  Neurological: She is alert and oriented to person, place, and time  Skin: Skin is warm, dry and intact  No rash noted  Pinpoint sized flesh-colored papules scattered on the left cheek below the left eye  No suggestion of infection or inflammation  Psychiatric: She has a normal mood and affect  Her speech is normal and behavior is normal  Judgment and thought content normal  Cognition and memory are normal    Vitals reviewed

## 2019-02-28 NOTE — ASSESSMENT & PLAN NOTE
Skin at the site where patient reports feeling bumps on her skin has no concerning findings  Recommended that she continue with moisturizer and will continue to monitor  Will reassess at her follow-up

## 2019-03-01 ENCOUNTER — APPOINTMENT (OUTPATIENT)
Dept: LAB | Facility: HOSPITAL | Age: 24
End: 2019-03-01
Attending: OTOLARYNGOLOGY
Payer: COMMERCIAL

## 2019-03-01 ENCOUNTER — TRANSCRIBE ORDERS (OUTPATIENT)
Dept: LAB | Facility: HOSPITAL | Age: 24
End: 2019-03-01

## 2019-03-01 DIAGNOSIS — J30.9 ALLERGIC RHINITIS, UNSPECIFIED SEASONALITY, UNSPECIFIED TRIGGER: Primary | ICD-10-CM

## 2019-03-01 DIAGNOSIS — J30.9 ALLERGIC RHINITIS, UNSPECIFIED SEASONALITY, UNSPECIFIED TRIGGER: ICD-10-CM

## 2019-03-01 PROCEDURE — 82785 ASSAY OF IGE: CPT

## 2019-03-01 PROCEDURE — 86003 ALLG SPEC IGE CRUDE XTRC EA: CPT

## 2019-03-01 PROCEDURE — 36415 COLL VENOUS BLD VENIPUNCTURE: CPT

## 2019-03-04 LAB
A ALTERNATA IGE QN: <0.1 KUA/I
A FUMIGATUS IGE QN: <0.1 KUA/I
ALLERGEN COMMENT: ABNORMAL
ALLERGEN COMMENT: NORMAL
ALMOND IGE QN: <0.1 KUA/I
BERMUDA GRASS IGE QN: <0.1 KUA/I
BOXELDER IGE QN: <0.1 KUA/I
C HERBARUM IGE QN: <0.1 KUA/I
CASHEW NUT IGE QN: <0.1 KUA/I
CAT DANDER IGE QN: <0.1 KUA/I
CMN PIGWEED IGE QN: <0.1 KUA/I
CODFISH IGE QN: <0.1 KUA/I
COMMON RAGWEED IGE QN: <0.1 KUA/I
COTTONWOOD IGE QN: <0.1 KUA/I
D FARINAE IGE QN: <0.1 KUA/I
D PTERONYSS IGE QN: 0.13 KUA/I
DOG DANDER IGE QN: <0.1 KUA/I
EGG WHITE IGE QN: <0.1 KUA/I
GLUTEN IGE QN: <0.1 KUA/I
HAZELNUT IGE QN: <0.1 KUA/L
LONDON PLANE IGE QN: <0.1 KUA/I
MILK IGE QN: <0.1 KUA/I
MOUSE URINE PROT IGE QN: <0.1 KUA/I
MT JUNIPER IGE QN: <0.1 KUA/I
MUGWORT IGE QN: <0.1 KUA/I
P NOTATUM IGE QN: <0.1 KUA/I
PEANUT IGE QN: <0.1 KUA/I
ROACH IGE QN: <0.1 KUA/I
SALMON IGE QN: <0.1 KUA/I
SCALLOP IGE QN: <0.1 KUA/L
SESAME SEED IGE QN: <0.1 KUA/I
SHEEP SORREL IGE QN: <0.1 KUA/I
SHRIMP IGE QN: <0.1 KUA/L
SILVER BIRCH IGE QN: <0.1 KUA/I
SOYBEAN IGE QN: <0.1 KUA/I
TIMOTHY IGE QN: <0.1 KUA/I
TOTAL IGE SMQN RAST: 65.8 KU/L (ref 0–113)
TOTAL IGE SMQN RAST: 66.4 KU/L (ref 0–113)
TUNA IGE QN: <0.1 KUA/I
WALNUT IGE QN: <0.1 KUA/I
WALNUT IGE QN: <0.1 KUA/I
WHEAT IGE QN: <0.1 KUA/I
WHITE ASH IGE QN: <0.1 KUA/I
WHITE ELM IGE QN: <0.1 KUA/I
WHITE MULBERRY IGE QN: <0.1 KUA/I
WHITE OAK IGE QN: <0.1 KUA/I

## 2019-03-06 ENCOUNTER — OFFICE VISIT (OUTPATIENT)
Dept: ENDOCRINOLOGY | Facility: CLINIC | Age: 24
End: 2019-03-06
Payer: COMMERCIAL

## 2019-03-06 VITALS
BODY MASS INDEX: 22.08 KG/M2 | HEART RATE: 76 BPM | HEIGHT: 62 IN | DIASTOLIC BLOOD PRESSURE: 80 MMHG | SYSTOLIC BLOOD PRESSURE: 110 MMHG | WEIGHT: 120 LBS

## 2019-03-06 DIAGNOSIS — E10.65 TYPE 1 DIABETES MELLITUS WITH HYPERGLYCEMIA (HCC): Primary | ICD-10-CM

## 2019-03-06 PROCEDURE — 99215 OFFICE O/P EST HI 40 MIN: CPT | Performed by: PHYSICIAN ASSISTANT

## 2019-03-06 NOTE — PROGRESS NOTES
Established Patient Progress Note      Chief Complaint   Patient presents with    Diabetes Type 1        History of Present Illness:   Marie Edwards is a 27 y o  female with a history of type 1 diabetes with long term use of insulin since Jan 2019  Reports complications of none  Denies recent illness or hospitalizations  Denies recent severe hypoglycemic or severe hyperglycemic episodes  Denies any issues with her current regimen  home glucose monitoring: are performed regularly 4-6x per day  Was recently in the ER with hypoglycemia-- could not bring BG up on her own  Since that time insulin was reduced and hypoglycemia has improved  She has met with dietician  Interested in pump/sensor  Complains of severe burning with lantus  Also reports belching, abdominal pain, and Joint aches  Currently on OCP with no plans for pregnancy in near future  She did have a miscarriage last year  Since insulin was adjured most of the readings are in the low 100s with occasional excursions 191, 208  She did have one reading of 329 and she is not sure why  She had a low of 65 on march 1st        Current regimen:   Lantus 8 units at bedtime, Novolog 3 units before meals  Last Eye Exam: Had Diabetic Eye Exam, blurry vision since d/c  Last Foot Exam: Today at visit         Patient Active Problem List   Diagnosis    Patellofemoral pain syndrome of right knee    Depression    Abnormal liver function test    Chronic RUQ pain    Chronic fatigue and malaise    Depression with anxiety    Type 1 diabetes mellitus without complication ([address] )    Hypokalemia    Type 1 diabetes mellitus with hyperglycemia ([address] )    Need for pertussis vaccination    Need for influenza vaccination    Abnormal stools    Bumps on skin      Past Medical History:   Diagnosis Date    Anxiety     Anxiety and depression     Depression     Diabetes mellitus ([address] ) 1/17/2019    Eating disorder     history of anorexia/bulemia [id number]    Fracture of fibula     R Salter I      Past Surgical History:   Procedure Laterality Date    WISDOM TOOTH EXTRACTION      WRIST SURGERY      left; Excision of ganglion      Family History   Problem Relation Age of Onset    Hypertension Mother    Joseph [place] Migraines Mother         Headache    Diabetes type II Mother     Varicose Veins Mother     Hyperlipidemia Mother    [place] Diabetes Mother    Joseph [place] Arthritis Mother     Depression Mother     Cholelithiasis Father     Hypertension Father     Sarcoidosis Father         Liver    Hyperlipidemia Father     Diabetes Father     Coronary artery disease Father     Nephrolithiasis Father     Cirrhosis Father     Alcohol abuse Father     Thyroid disease Sister     Cancer Family     Diabetes Family     Hypertension Family      Social History     Tobacco Use    Smoking status: Never Smoker    Smokeless tobacco: Never Used    Tobacco comment: Tobacco smoke exposure (Father smokes cigars)   Substance Use Topics    Alcohol use: No     Allergies   Allergen Reactions    Iodides Throat Swelling     Reaction Date: 28Jul2014; Action Taken: none; Category: Allergy;     Iodine          Current Outpatient Medications:     ALTAVERA 0 15-30 MG-MCG per tablet, , Disp: , Rfl:     B-D UF III MINI PEN NEEDLES 31G X 5 MM MISC, USE 4 TIMES A DAY (BEFORE MEALS AND AT BEDTIME), Disp: , Rfl: 0    Blood Glucose Monitoring Suppl (ONETOUCH VERIO) w/Device KIT, Use as directed, Disp: , Rfl: 0    Cetirizine HCl (ZYRTEC PO), Take by mouth, Disp: , Rfl:     DULoxetine (CYMBALTA) 30 mg delayed release capsule, For the first 7 days, take 1 tablet daily; then increase to 2 tablets daily  , Disp: 60 capsule, Rfl: 1    glucagon (GLUCAGON EMERGENCY) 1 MG injection, Inject 1 mg under the skin once as needed for low blood sugar for up to 2 doses, Disp: 1 kit, Rfl: 1    insulin aspart (NovoLOG) 100 Units/mL injection pen, Inject 3 Units under the skin 3 (three) times a day with meals , Disp: , Rfl:   Insulin Pen Needle (BD PEN NEEDLE NASIM U/F) 32G X 4 MM MISC, by Does not apply route 4 (four) times a day for 180 days, Disp: 360 each, Rfl: 1    Insulin Pen Needle 29G X 5MM MISC, by Does not apply route 4 (four) times a day (before meals and at bedtime), Disp: 200 each, Rfl: 0    montelukast (SINGULAIR) 10 mg tablet, Take 10 mg by mouth every morning, Disp: , Rfl: 5    ONETOUCH DELICA LANCETS 55H MISC, Patient test 6 times daily, Disp: 600 each, Rfl: 0    ONETOUCH VERIO test strip, Test six times a day, Disp: 600 each, Rfl: 0    insulin degludec (TRESIBA FLEXTOUCH) 100 units/mL injection pen, 8 units daily, Disp: 5 pen, Rfl: 1    Review of Systems   Constitutional: Negative for activity change, appetite change, chills, diaphoresis, fatigue, fever and unexpected weight change  HENT: Negative for trouble swallowing and voice change  Eyes: Positive for visual disturbance  Respiratory: Negative for shortness of breath  Cardiovascular: Negative for chest pain and palpitations  Gastrointestinal: Positive for abdominal pain (and gas/belching)  Negative for constipation and diarrhea  Endocrine: Negative for cold intolerance, heat intolerance, polydipsia, polyphagia and polyuria  Genitourinary: Negative for frequency and menstrual problem  Musculoskeletal: Positive for arthralgias  Negative for myalgias  Skin: Negative for rash  Allergic/Immunologic: Negative for food allergies  Neurological: Negative for dizziness and tremors  Hematological: Negative for adenopathy  Psychiatric/Behavioral: Negative for sleep disturbance  All other systems reviewed and are negative  Physical Exam:  Body mass index is 21 95 kg/m²    /80   Pulse 76   Ht 5' 2" (1 575 m)   Wt 54 4 kg (120 lb)   BMI 21 95 kg/m²    Wt Readings from Last 3 Encounters:   03/06/19 54 4 kg (120 lb)   02/28/19 52 6 kg (116 lb)   02/27/19 50 8 kg (112 lb)       Physical Exam   Constitutional: She is oriented to person, place, and time  She appears well-developed and well-nourished  No distress  HENT:   Head: Normocephalic and atraumatic  Eyes: Pupils are equal, round, and reactive to light  Conjunctivae are normal    Neck: Normal range of motion  Neck supple  No thyromegaly present  Cardiovascular: Normal rate, regular rhythm and normal heart sounds  Pulmonary/Chest: Effort normal and breath sounds normal  No respiratory distress  She has no wheezes  She has no rales  Abdominal: Soft  Bowel sounds are normal  She exhibits no distension  There is no tenderness  Musculoskeletal: Normal range of motion  She exhibits no edema  Neurological: She is alert and oriented to person, place, and time  Skin: Skin is warm and dry  Psychiatric: She has a normal mood and affect  Vitals reviewed  Diabetic Foot Exam    Labs:   Lab Results   Component Value Date    HGBA1C 8 4 (H) 02/27/2019    HGBA1C 8 7 (H) 01/17/2019    HGBA1C 7 1 (H) 12/15/2018     Lab Results   Component Value Date    CREATININE 0 75 02/27/2019    CREATININE 0 58 (L) 01/26/2019    CREATININE 0 82 01/25/2019    BUN 10 02/27/2019    K 3 4 (L) 02/27/2019     02/27/2019    CO2 26 02/27/2019     eGFR   Date Value Ref Range Status   02/27/2019 114 ml/min/1 73sq m Final     Lab Results   Component Value Date    HDL 56 01/17/2019    TRIG 74 01/17/2019     Lab Results   Component Value Date    ALT 18 02/27/2019    AST 14 02/27/2019    ALKPHOS 61 02/27/2019     Lab Results   Component Value Date    XTM1VMJZTJSP 1 090 01/17/2019     No results found for: FREET4, TSI    Impression & Plan:    Problem List Items Addressed This Visit        Endocrine    Type 1 diabetes mellitus with hyperglycemia ([facility] [address] ) - Primary     Since last visit, she went to ER due to hypoglycemia  Insulin has been adjusted and hypoglycemia has improved  She is complaining of severe burning with lantus-- will change to tresiba U-100 which many also help to prevent hypoglycemia     She is interested in pump and sensor  Will refer to education for Flexible insulin/PrePump/Sensor training  Recommended Tandem X2/Dexcom G6 with Basal IQ since she does not want to do frequent fingersticks  She was given info for dexcom rep  Until she meets with dietician for flex insulin training/pump she can use 1 unit novolog for snacks containing carbohydrates  Will screen for celiac disease due to the GI symptoms she has been having but suggest follow up with PCP for evaluation of GI symptoms and Joint pains  Advised her to rescheduled missed appt with psychologist due to difficulty coping with Diagnosis of Diabetes  Discussed importance of remaining on birth control- when planning pregnancy should let us know will need to optimize diabetes control and refer for  center  Relevant Medications    insulin degludec (TRESIBA FLEXTOUCH) 100 units/mL injection pen    Other Relevant Orders    Ambulatory referral to Diabetic Education    Celiac Disease Antibody Profile          Orders Placed This Encounter   Procedures    Celiac Disease Antibody Profile     Standing Status:   Future     Standing Expiration Date:   3/6/2020    Ambulatory referral to Diabetic Education     Standing Status:   Future     Standing Expiration Date:   2019     Referral Priority:   Routine     Referral Type:   Consult - AMB     Referral Reason:   Specialty Services Required     Requested Specialty:   Endocrinology     Number of Visits Requested:   1     Expiration Date:   3/6/2020       There are no Patient Instructions on file for this visit  Discussed with the patient and all questioned fully answered  She will call me if any problems arise  Follow-up appointment in 2 months       Counseled patient on diagnostic results, prognosis, risk and benefit of treatment options, instruction for management, importance of treatment compliance, Risk  factor reduction and impressions    Yamini Rodríguez PA-C

## 2019-03-06 NOTE — ASSESSMENT & PLAN NOTE
Since last visit, she went to ER due to hypoglycemia  Insulin has been adjusted and hypoglycemia has improved  She is complaining of severe burning with lantus-- will change to tresiba U-100 which many also help to prevent hypoglycemia  She is interested in pump and sensor  Will refer to education for Flexible insulin/PrePump/Sensor training  Recommended Tandem X2/Dexcom G6 with Basal IQ since she does not want to do frequent fingersticks  She was given info for dexcom rep  Until she meets with dietician for flex insulin training/pump she can use 1 unit novolog for snacks containing carbohydrates  Will screen for celiac disease due to the GI symptoms she has been having but suggest follow up with PCP for evaluation of GI symptoms and Joint pains  Advised her to rescheduled missed appt with psychologist due to difficulty coping with Diagnosis of Diabetes  Discussed importance of remaining on birth control- when planning pregnancy should let us know will need to optimize diabetes control and refer for  center

## 2019-03-07 ENCOUNTER — TELEPHONE (OUTPATIENT)
Dept: OBGYN CLINIC | Facility: CLINIC | Age: 24
End: 2019-03-07

## 2019-03-07 DIAGNOSIS — Z30.41 ORAL CONTRACEPTIVE PILL SURVEILLANCE: Primary | ICD-10-CM

## 2019-03-07 RX ORDER — LEVONORGESTREL AND ETHINYL ESTRADIOL 0.15-0.03
KIT ORAL
Qty: 28 TABLET | Refills: 0 | Status: SHIPPED | OUTPATIENT
Start: 2019-03-07 | End: 2019-04-03 | Stop reason: SDUPTHER

## 2019-03-18 ENCOUNTER — TELEPHONE (OUTPATIENT)
Dept: ENDOCRINOLOGY | Facility: CLINIC | Age: 24
End: 2019-03-18

## 2019-03-18 NOTE — TELEPHONE ENCOUNTER
Called and left msg with sol  I have called patient to get an idea of how her BG readings are doing but her voicemail is full  IT is likely that I will be able to give her Endocrine clearance tomorrow but I would like to speak with her first to give instructions   Would prefer first case/early AM if possible due to new Dx Type 1 Diabetes and risk of Hypoglycemia if procedure late in day

## 2019-03-18 NOTE — TELEPHONE ENCOUNTER
Sol from Dr Shnu Shaikh office called regarding pt's surgery clearance form they sent over  Pt is having surgery this Friday, 3-22-19 and they need the form ASAP  If you have any questions, please call Sol at the doctor's office at [phone] x  [phone]  Thanx

## 2019-03-19 ENCOUNTER — TELEPHONE (OUTPATIENT)
Dept: ENDOCRINOLOGY | Facility: CLINIC | Age: 24
End: 2019-03-19

## 2019-03-19 NOTE — TELEPHONE ENCOUNTER
Spoke with patient by phone  Reports morning BG readings usually in the 100-150 range  She will be cleared for surgery from endocrine standpoint  Advised that she reduce lantus to 6 units night before surgery to prevent hypoglycemia  She report some higher BG readings after meals- she will send Log for review tomorrow and will adjust meds if needed

## 2019-03-21 ENCOUNTER — TELEPHONE (OUTPATIENT)
Dept: DIABETES SERVICES | Facility: CLINIC | Age: 24
End: 2019-03-21

## 2019-03-21 ENCOUNTER — OFFICE VISIT (OUTPATIENT)
Dept: INTERNAL MEDICINE CLINIC | Facility: CLINIC | Age: 24
End: 2019-03-21
Payer: COMMERCIAL

## 2019-03-21 VITALS
SYSTOLIC BLOOD PRESSURE: 118 MMHG | DIASTOLIC BLOOD PRESSURE: 70 MMHG | WEIGHT: 115.6 LBS | HEIGHT: 62 IN | BODY MASS INDEX: 21.27 KG/M2 | HEART RATE: 86 BPM | OXYGEN SATURATION: 98 % | RESPIRATION RATE: 16 BRPM | TEMPERATURE: 97.8 F

## 2019-03-21 DIAGNOSIS — E10.9 TYPE 1 DIABETES MELLITUS WITHOUT COMPLICATION (HCC): ICD-10-CM

## 2019-03-21 DIAGNOSIS — F32.9 MAJOR DEPRESSIVE DISORDER WITH CURRENT ACTIVE EPISODE, UNSPECIFIED DEPRESSION EPISODE SEVERITY, UNSPECIFIED WHETHER RECURRENT: Primary | ICD-10-CM

## 2019-03-21 PROCEDURE — 3008F BODY MASS INDEX DOCD: CPT | Performed by: NURSE PRACTITIONER

## 2019-03-21 PROCEDURE — 99214 OFFICE O/P EST MOD 30 MIN: CPT | Performed by: NURSE PRACTITIONER

## 2019-03-21 RX ORDER — BUPROPION HYDROCHLORIDE 150 MG/1
150 TABLET, EXTENDED RELEASE ORAL 2 TIMES DAILY
Qty: 60 TABLET | Refills: 0 | Status: SHIPPED | OUTPATIENT
Start: 2019-03-21 | End: 2019-06-05 | Stop reason: ALTCHOICE

## 2019-03-21 NOTE — TELEPHONE ENCOUNTER
Yes she can move forward with flexible insulin asap based on current TDD  I think hypoglycemia has improved and she told me she is having some post meal highs so we can adjust ratios as needed as she comes out of honeymoon  Thanks!

## 2019-03-21 NOTE — ASSESSMENT & PLAN NOTE
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

## 2019-03-21 NOTE — ASSESSMENT & PLAN NOTE
Lab Results   Component Value Date    HGBA1C 8 4 (H) 02/27/2019       No results for input(s): POCGLU in the last 72 hours  Blood Sugar Average: Last 72 hrs:   blood sugars have improved since her last visit  She was having issues with her blood sugar going too low and adjustments were made by her endocrinologist   She reports that since these changes have been made her sugars have been more desirable  She continues to work through the process of getting an insulin pump

## 2019-03-21 NOTE — TELEPHONE ENCOUNTER
I called Michael Sonny to review 3 day food record with carb counts that she recently submitted  Patient is counting carbs accurately and is interested in moving to flexible insulin  Patient current insulin regimen would equal TDD 17 for ICR and ISF calculations  I am aware that she has had some dose changes recently due to hypoglycemia, is possibly still in honeymoon phase, and that she has upcoming surgery on April 10th  Please advise on whether to move forward with scheduling flex insulin instruction or if you want to wait  Thank you!

## 2019-03-21 NOTE — PROGRESS NOTES
Assessment/Plan:    Type 1 diabetes mellitus without complication (HCC)  Lab Results   Component Value Date    HGBA1C 8 4 (H) 02/27/2019       No results for input(s): POCGLU in the last 72 hours  Blood Sugar Average: Last 72 hrs:   blood sugars have improved since her last visit  She was having issues with her blood sugar going too low and adjustments were made by her endocrinologist   She reports that since these changes have been made her sugars have been more desirable  She continues to work through the process of getting an insulin pump  Depression  Patient continues with unchanged symptoms despite being on duloxetine for 6 weeks  We discussed options for management and we elected to change her from duloxetine to bupropion  She was started 150 mg once daily and increased to 150 mg twice daily after the 1st 3 days if medication is tolerated well  She is fortunately able to have free counseling at her school encouraged her to continue to follow up with her scheduled weekly therapy sessions  Patient will return to the office for reassessment in 1 month  Encouraged her to call the office immediately if she develops any signs of medication intolerance or begins to have suicidal thoughts  Patient verbalizes understanding  Diagnoses and all orders for this visit:    Major depressive disorder with current active episode, unspecified depression episode severity, unspecified whether recurrent  -     buPROPion (WELLBUTRIN SR) 150 mg 12 hr tablet; Take 1 tablet (150 mg total) by mouth 2 (two) times a day    Type 1 diabetes mellitus without complication (HCC)          Subjective:      Patient ID: Chrissie Delgado is a 27 y o  female  Pt is a 27y o  year old female who is seen today for 6 week follow up to management of depression  PMH of depression for many years and has previously tried multiple antidepressants in the past with poor tolerance    She had stopped taking medications and her symptoms increased significantly after her diagnosis of DMI early this year  She was started on venlafaxine and has been taking 60 mg QD x 6 weeks  She reports today that although she has been tolerating the medication well, she does not notice any change in her mood  She was referred for counseling and started with her 1st visit yesterday at 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  She will be seeing the counselor once a week for 1 hour long sessions  Patient denies suicidal ideation continues to complain of depressed mood  The following portions of the patient's history were reviewed and updated as appropriate: allergies, current medications, past family history, past medical history, past social history, past surgical history and problem list     Review of Systems   Constitutional: Negative for activity change, appetite change, chills, fatigue, fever and unexpected weight change  HENT: Positive for congestion  Negative for hearing loss  Eyes: Negative for visual disturbance  Respiratory: Positive for cough  Negative for chest tightness and shortness of breath  Cardiovascular: Negative for chest pain, palpitations and leg swelling  Gastrointestinal: Negative for abdominal pain, constipation, diarrhea, nausea and vomiting  Genitourinary: Negative for dysuria and frequency  Musculoskeletal: Negative for arthralgias and myalgias  Allergic/Immunologic: Positive for environmental allergies  Neurological: Negative for dizziness, light-headedness and headaches  Psychiatric/Behavioral: Positive for dysphoric mood  Negative for agitation, behavioral problems, self-injury, sleep disturbance and suicidal ideas  The patient is not nervous/anxious            Objective:      /70 (BP Location: Left arm, Patient Position: Sitting)   Pulse 86   Temp 97 8 °F (36 6 °C)   Resp 16   Ht 5' 2" (1 575 m)   Wt 52 4 kg (115 lb 9 6 oz)   SpO2 98%   BMI 21 14 kg/m²          Physical Exam   Constitutional: She is oriented to person, place, and time  Vital signs are normal  She appears well-developed and well-nourished  She is cooperative  HENT:   Right Ear: Hearing, tympanic membrane, external ear and ear canal normal    Left Ear: Hearing, tympanic membrane, external ear and ear canal normal    Nose: Mucosal edema present  Mouth/Throat: Uvula is midline, oropharynx is clear and moist and mucous membranes are normal    Postnasal drip noted   Eyes: Pupils are equal, round, and reactive to light  Conjunctivae and lids are normal    Neck: No JVD present  Carotid bruit is not present  No thyromegaly present  Cardiovascular: Normal rate, regular rhythm, normal heart sounds and intact distal pulses  No murmur heard  Pulmonary/Chest: Effort normal and breath sounds normal  No respiratory distress  Abdominal: Soft  Normal appearance and bowel sounds are normal    Musculoskeletal: Normal range of motion  She exhibits no edema  Lymphadenopathy:     She has no cervical adenopathy  Neurological: She is alert and oriented to person, place, and time  Skin: Skin is warm, dry and intact  Psychiatric: She has a normal mood and affect  Her speech is normal and behavior is normal  Judgment and thought content normal  Cognition and memory are normal    Vitals reviewed

## 2019-03-22 ENCOUNTER — APPOINTMENT (OUTPATIENT)
Dept: LAB | Facility: HOSPITAL | Age: 24
End: 2019-03-22
Payer: COMMERCIAL

## 2019-03-22 ENCOUNTER — TRANSCRIBE ORDERS (OUTPATIENT)
Dept: LAB | Facility: HOSPITAL | Age: 24
End: 2019-03-22

## 2019-03-22 ENCOUNTER — OFFICE VISIT (OUTPATIENT)
Dept: LAB | Facility: HOSPITAL | Age: 24
End: 2019-03-22
Attending: OTOLARYNGOLOGY
Payer: COMMERCIAL

## 2019-03-22 DIAGNOSIS — E10.65 TYPE 1 DIABETES MELLITUS WITH HYPERGLYCEMIA (HCC): ICD-10-CM

## 2019-03-22 DIAGNOSIS — J34.2 DEVIATED NASAL SEPTUM: Primary | ICD-10-CM

## 2019-03-22 DIAGNOSIS — J34.2 DEVIATED NASAL SEPTUM: ICD-10-CM

## 2019-03-22 LAB
ANION GAP SERPL CALCULATED.3IONS-SCNC: 7 MMOL/L (ref 4–13)
BASOPHILS # BLD AUTO: 0.03 THOUSANDS/ΜL (ref 0–0.1)
BASOPHILS NFR BLD AUTO: 0 % (ref 0–1)
BUN SERPL-MCNC: 11 MG/DL (ref 5–25)
CALCIUM SERPL-MCNC: 9.1 MG/DL (ref 8.3–10.1)
CHLORIDE SERPL-SCNC: 104 MMOL/L (ref 100–108)
CO2 SERPL-SCNC: 26 MMOL/L (ref 21–32)
CREAT SERPL-MCNC: 0.73 MG/DL (ref 0.6–1.3)
EOSINOPHIL # BLD AUTO: 0.08 THOUSAND/ΜL (ref 0–0.61)
EOSINOPHIL NFR BLD AUTO: 1 % (ref 0–6)
ERYTHROCYTE [DISTWIDTH] IN BLOOD BY AUTOMATED COUNT: 12.2 % (ref 11.6–15.1)
GFR SERPL CREATININE-BSD FRML MDRD: 117 ML/MIN/1.73SQ M
GLUCOSE P FAST SERPL-MCNC: 100 MG/DL (ref 65–99)
HCT VFR BLD AUTO: 40.7 % (ref 34.8–46.1)
HGB BLD-MCNC: 13.3 G/DL (ref 11.5–15.4)
IMM GRANULOCYTES # BLD AUTO: 0.02 THOUSAND/UL (ref 0–0.2)
IMM GRANULOCYTES NFR BLD AUTO: 0 % (ref 0–2)
LYMPHOCYTES # BLD AUTO: 3.54 THOUSANDS/ΜL (ref 0.6–4.47)
LYMPHOCYTES NFR BLD AUTO: 53 % (ref 14–44)
MCH RBC QN AUTO: 28.9 PG (ref 26.8–34.3)
MCHC RBC AUTO-ENTMCNC: 32.7 G/DL (ref 31.4–37.4)
MCV RBC AUTO: 88 FL (ref 82–98)
MONOCYTES # BLD AUTO: 0.5 THOUSAND/ΜL (ref 0.17–1.22)
MONOCYTES NFR BLD AUTO: 7 % (ref 4–12)
NEUTROPHILS # BLD AUTO: 2.66 THOUSANDS/ΜL (ref 1.85–7.62)
NEUTS SEG NFR BLD AUTO: 39 % (ref 43–75)
NRBC BLD AUTO-RTO: 0 /100 WBCS
PLATELET # BLD AUTO: 191 THOUSANDS/UL (ref 149–390)
PMV BLD AUTO: 12 FL (ref 8.9–12.7)
POTASSIUM SERPL-SCNC: 3.8 MMOL/L (ref 3.5–5.3)
RBC # BLD AUTO: 4.61 MILLION/UL (ref 3.81–5.12)
SODIUM SERPL-SCNC: 137 MMOL/L (ref 136–145)
WBC # BLD AUTO: 6.83 THOUSAND/UL (ref 4.31–10.16)

## 2019-03-22 PROCEDURE — 83516 IMMUNOASSAY NONANTIBODY: CPT

## 2019-03-22 PROCEDURE — 86255 FLUORESCENT ANTIBODY SCREEN: CPT

## 2019-03-22 PROCEDURE — 85025 COMPLETE CBC W/AUTO DIFF WBC: CPT

## 2019-03-22 PROCEDURE — 36415 COLL VENOUS BLD VENIPUNCTURE: CPT

## 2019-03-22 PROCEDURE — 93005 ELECTROCARDIOGRAM TRACING: CPT

## 2019-03-22 PROCEDURE — 80048 BASIC METABOLIC PNL TOTAL CA: CPT

## 2019-03-22 PROCEDURE — 82784 ASSAY IGA/IGD/IGG/IGM EACH: CPT

## 2019-03-22 NOTE — TELEPHONE ENCOUNTER
Austin Sepulveda,     ISF:  1700 / 17 = 100   ICR:  450 / 17 = 26 47    Or   2 8 x 115 lbs / 17 = 18 9    What ICR do you want to use? 1:20? And what do you want to set BG target at? Thanks!

## 2019-03-23 LAB
ATRIAL RATE: 63 BPM
ENDOMYSIUM IGA SER QL: NEGATIVE
GLIADIN PEPTIDE IGA SER-ACNC: 4 UNITS (ref 0–19)
GLIADIN PEPTIDE IGG SER-ACNC: 3 UNITS (ref 0–19)
IGA SERPL-MCNC: 333 MG/DL (ref 87–352)
P AXIS: 68 DEGREES
PR INTERVAL: 144 MS
QRS AXIS: 27 DEGREES
QRSD INTERVAL: 80 MS
QT INTERVAL: 392 MS
QTC INTERVAL: 401 MS
T WAVE AXIS: 54 DEGREES
TTG IGA SER-ACNC: <2 U/ML (ref 0–3)
TTG IGG SER-ACNC: <2 U/ML (ref 0–5)
VENTRICULAR RATE: 63 BPM

## 2019-03-23 PROCEDURE — 93010 ELECTROCARDIOGRAM REPORT: CPT | Performed by: INTERNAL MEDICINE

## 2019-03-25 NOTE — TELEPHONE ENCOUNTER
Austin Alexandra Huge,    When I last spoke with her she was reporting highs after meals so lets start with the Carb ratio of 20, Correction Factor of 25 and Target 100      Thanks,  E  RAVI Gonzalez

## 2019-03-26 ENCOUNTER — TELEPHONE (OUTPATIENT)
Dept: ENDOCRINOLOGY | Facility: CLINIC | Age: 24
End: 2019-03-26

## 2019-03-26 NOTE — TELEPHONE ENCOUNTER
----- Message from Rxoane Jackson PA-C sent at 3/25/2019 10:11 AM EDT -----  Celiac Screen is normal-- Continue to follow up with PCP regarding GI symptoms

## 2019-03-27 ENCOUNTER — TELEPHONE (OUTPATIENT)
Dept: ENDOCRINOLOGY | Facility: CLINIC | Age: 24
End: 2019-03-27

## 2019-03-29 ENCOUNTER — TELEPHONE (OUTPATIENT)
Dept: ENDOCRINOLOGY | Facility: CLINIC | Age: 24
End: 2019-03-29

## 2019-04-02 ENCOUNTER — OFFICE VISIT (OUTPATIENT)
Dept: DIABETES SERVICES | Facility: CLINIC | Age: 24
End: 2019-04-02
Payer: COMMERCIAL

## 2019-04-02 DIAGNOSIS — E10.65 TYPE 1 DIABETES MELLITUS WITH HYPERGLYCEMIA (HCC): Primary | ICD-10-CM

## 2019-04-02 PROCEDURE — 98960 EDU&TRN PT SELF-MGMT NQHP 1: CPT | Performed by: DIETITIAN, REGISTERED

## 2019-04-03 ENCOUNTER — ANNUAL EXAM (OUTPATIENT)
Dept: OBGYN CLINIC | Facility: CLINIC | Age: 24
End: 2019-04-03
Payer: COMMERCIAL

## 2019-04-03 VITALS
HEIGHT: 62 IN | DIASTOLIC BLOOD PRESSURE: 70 MMHG | WEIGHT: 115 LBS | BODY MASS INDEX: 21.16 KG/M2 | SYSTOLIC BLOOD PRESSURE: 110 MMHG

## 2019-04-03 DIAGNOSIS — Z01.419 ENCOUNTER FOR GYNECOLOGICAL EXAMINATION (GENERAL) (ROUTINE) WITHOUT ABNORMAL FINDINGS: Primary | ICD-10-CM

## 2019-04-03 DIAGNOSIS — Z30.41 ORAL CONTRACEPTIVE PILL SURVEILLANCE: ICD-10-CM

## 2019-04-03 PROCEDURE — 99395 PREV VISIT EST AGE 18-39: CPT | Performed by: PHYSICIAN ASSISTANT

## 2019-04-03 RX ORDER — LEVONORGESTREL AND ETHINYL ESTRADIOL 0.15-0.03
KIT ORAL
Qty: 84 TABLET | Refills: 3 | Status: SHIPPED | OUTPATIENT
Start: 2019-04-03 | End: 2020-02-19 | Stop reason: SDUPTHER

## 2019-04-11 ENCOUNTER — TELEPHONE (OUTPATIENT)
Dept: ENDOCRINOLOGY | Facility: CLINIC | Age: 24
End: 2019-04-11

## 2019-04-24 DIAGNOSIS — E10.65 TYPE 1 DIABETES MELLITUS WITH HYPERGLYCEMIA (HCC): Primary | ICD-10-CM

## 2019-04-25 ENCOUNTER — OFFICE VISIT (OUTPATIENT)
Dept: INTERNAL MEDICINE CLINIC | Facility: CLINIC | Age: 24
End: 2019-04-25
Payer: COMMERCIAL

## 2019-04-25 VITALS
HEIGHT: 62 IN | RESPIRATION RATE: 16 BRPM | WEIGHT: 114 LBS | SYSTOLIC BLOOD PRESSURE: 102 MMHG | TEMPERATURE: 98.1 F | BODY MASS INDEX: 20.98 KG/M2 | HEART RATE: 75 BPM | DIASTOLIC BLOOD PRESSURE: 64 MMHG

## 2019-04-25 DIAGNOSIS — R79.89 ABNORMAL LIVER FUNCTION TEST: Primary | ICD-10-CM

## 2019-04-25 DIAGNOSIS — F41.8 DEPRESSION WITH ANXIETY: ICD-10-CM

## 2019-04-25 DIAGNOSIS — E10.9 TYPE 1 DIABETES MELLITUS WITHOUT COMPLICATION (HCC): ICD-10-CM

## 2019-04-25 PROCEDURE — 99214 OFFICE O/P EST MOD 30 MIN: CPT | Performed by: NURSE PRACTITIONER

## 2019-05-06 ENCOUNTER — TELEPHONE (OUTPATIENT)
Dept: ENDOCRINOLOGY | Facility: CLINIC | Age: 24
End: 2019-05-06

## 2019-05-12 ENCOUNTER — HOSPITAL ENCOUNTER (EMERGENCY)
Facility: HOSPITAL | Age: 24
Discharge: HOME | End: 2019-05-12
Attending: EMERGENCY MEDICINE | Admitting: EMERGENCY MEDICINE
Payer: COMMERCIAL

## 2019-05-12 VITALS
HEART RATE: 74 BPM | RESPIRATION RATE: 18 BRPM | WEIGHT: 117.5 LBS | OXYGEN SATURATION: 100 % | SYSTOLIC BLOOD PRESSURE: 110 MMHG | BODY MASS INDEX: 21.84 KG/M2 | TEMPERATURE: 98.5 F | DIASTOLIC BLOOD PRESSURE: 80 MMHG

## 2019-05-12 DIAGNOSIS — E10.9 TYPE 1 DIABETES (HCC): ICD-10-CM

## 2019-05-12 DIAGNOSIS — R73.9 HYPERGLYCEMIA: Primary | ICD-10-CM

## 2019-05-12 LAB
ACETONE SERPL-MCNC: NEGATIVE MG/DL
ALBUMIN SERPL BCP-MCNC: 3.9 G/DL (ref 3.5–5)
ALP SERPL-CCNC: 90 U/L (ref 46–116)
ALT SERPL W P-5'-P-CCNC: 20 U/L (ref 12–78)
ANION GAP SERPL CALCULATED.3IONS-SCNC: 9 MMOL/L (ref 4–13)
AST SERPL W P-5'-P-CCNC: 14 U/L (ref 5–45)
BACTERIA UR QL AUTO: ABNORMAL /HPF
BASE EX.OXY STD BLDV CALC-SCNC: 74.6 % (ref 60–80)
BASE EXCESS BLDV CALC-SCNC: -0.8 MMOL/L
BILIRUB SERPL-MCNC: 0.8 MG/DL (ref 0.2–1)
BILIRUB UR QL STRIP: NEGATIVE
BUN SERPL-MCNC: 14 MG/DL (ref 5–25)
CALCIUM SERPL-MCNC: 9.2 MG/DL (ref 8.3–10.1)
CHLORIDE SERPL-SCNC: 102 MMOL/L (ref 100–108)
CLARITY UR: CLEAR
CO2 SERPL-SCNC: 24 MMOL/L (ref 21–32)
COLOR UR: YELLOW
CREAT SERPL-MCNC: 0.82 MG/DL (ref 0.6–1.3)
ERYTHROCYTE [DISTWIDTH] IN BLOOD BY AUTOMATED COUNT: 12.1 % (ref 11.6–15.1)
GFR SERPL CREATININE-BSD FRML MDRD: 102 ML/MIN/1.73SQ M
GLUCOSE SERPL-MCNC: 216 MG/DL (ref 65–140)
GLUCOSE SERPL-MCNC: 248 MG/DL (ref 65–140)
GLUCOSE SERPL-MCNC: 265 MG/DL (ref 65–140)
GLUCOSE UR STRIP-MCNC: ABNORMAL MG/DL
HCO3 BLDV-SCNC: 22.3 MMOL/L (ref 24–30)
HCT VFR BLD AUTO: 39.6 % (ref 34.8–46.1)
HGB BLD-MCNC: 13.3 G/DL (ref 11.5–15.4)
HGB UR QL STRIP.AUTO: NEGATIVE
KETONES UR STRIP-MCNC: NEGATIVE MG/DL
LEUKOCYTE ESTERASE UR QL STRIP: ABNORMAL
MCH RBC QN AUTO: 29.4 PG (ref 26.8–34.3)
MCHC RBC AUTO-ENTMCNC: 33.6 G/DL (ref 31.4–37.4)
MCV RBC AUTO: 87 FL (ref 82–98)
NITRITE UR QL STRIP: NEGATIVE
NON-SQ EPI CELLS URNS QL MICRO: ABNORMAL /HPF
O2 CT BLDV-SCNC: 14.8 ML/DL
PCO2 BLDV: 32.5 MM HG (ref 42–50)
PH BLDV: 7.45 [PH] (ref 7.3–7.4)
PH UR STRIP.AUTO: 7 [PH]
PLATELET # BLD AUTO: 188 THOUSANDS/UL (ref 149–390)
PMV BLD AUTO: 11.4 FL (ref 8.9–12.7)
PO2 BLDV: 37.9 MM HG (ref 35–45)
POTASSIUM SERPL-SCNC: 3.4 MMOL/L (ref 3.5–5.3)
PROT SERPL-MCNC: 7.7 G/DL (ref 6.4–8.2)
PROT UR STRIP-MCNC: NEGATIVE MG/DL
RBC # BLD AUTO: 4.53 MILLION/UL (ref 3.81–5.12)
RBC #/AREA URNS AUTO: ABNORMAL /HPF
SODIUM SERPL-SCNC: 135 MMOL/L (ref 136–145)
SP GR UR STRIP.AUTO: 1.01 (ref 1–1.03)
UROBILINOGEN UR QL STRIP.AUTO: 1 E.U./DL
WBC # BLD AUTO: 8.11 THOUSAND/UL (ref 4.31–10.16)
WBC #/AREA URNS AUTO: ABNORMAL /HPF

## 2019-05-12 PROCEDURE — 96361 HYDRATE IV INFUSION ADD-ON: CPT

## 2019-05-12 PROCEDURE — 82805 BLOOD GASES W/O2 SATURATION: CPT | Performed by: PHYSICIAN ASSISTANT

## 2019-05-12 PROCEDURE — 36415 COLL VENOUS BLD VENIPUNCTURE: CPT | Performed by: PHYSICIAN ASSISTANT

## 2019-05-12 PROCEDURE — 81001 URINALYSIS AUTO W/SCOPE: CPT | Performed by: PHYSICIAN ASSISTANT

## 2019-05-12 PROCEDURE — 99284 EMERGENCY DEPT VISIT MOD MDM: CPT | Performed by: PHYSICIAN ASSISTANT

## 2019-05-12 PROCEDURE — 96374 THER/PROPH/DIAG INJ IV PUSH: CPT

## 2019-05-12 PROCEDURE — 93005 ELECTROCARDIOGRAM TRACING: CPT

## 2019-05-12 PROCEDURE — 99285 EMERGENCY DEPT VISIT HI MDM: CPT

## 2019-05-12 PROCEDURE — 80053 COMPREHEN METABOLIC PANEL: CPT | Performed by: PHYSICIAN ASSISTANT

## 2019-05-12 PROCEDURE — 82948 REAGENT STRIP/BLOOD GLUCOSE: CPT

## 2019-05-12 PROCEDURE — 85027 COMPLETE CBC AUTOMATED: CPT | Performed by: PHYSICIAN ASSISTANT

## 2019-05-12 PROCEDURE — 82009 KETONE BODYS QUAL: CPT | Performed by: PHYSICIAN ASSISTANT

## 2019-05-12 RX ORDER — POTASSIUM CHLORIDE 20 MEQ/1
20 TABLET, EXTENDED RELEASE ORAL ONCE
Status: COMPLETED | OUTPATIENT
Start: 2019-05-12 | End: 2019-05-12

## 2019-05-12 RX ORDER — 0.9 % SODIUM CHLORIDE 0.9 %
3 VIAL (ML) INJECTION AS NEEDED
Status: DISCONTINUED | OUTPATIENT
Start: 2019-05-12 | End: 2019-05-12 | Stop reason: HOSPADM

## 2019-05-12 RX ORDER — ONDANSETRON 2 MG/ML
4 INJECTION INTRAMUSCULAR; INTRAVENOUS ONCE
Status: COMPLETED | OUTPATIENT
Start: 2019-05-12 | End: 2019-05-12

## 2019-05-12 RX ADMIN — POTASSIUM CHLORIDE 20 MEQ: 1500 TABLET, EXTENDED RELEASE ORAL at 04:16

## 2019-05-12 RX ADMIN — ONDANSETRON 4 MG: 2 INJECTION INTRAMUSCULAR; INTRAVENOUS at 04:10

## 2019-05-12 RX ADMIN — SODIUM CHLORIDE 1000 ML: 0.9 INJECTION, SOLUTION INTRAVENOUS at 03:35

## 2019-05-13 LAB
ATRIAL RATE: 60 BPM
P AXIS: 49 DEGREES
PR INTERVAL: 134 MS
QRS AXIS: 68 DEGREES
QRSD INTERVAL: 82 MS
QT INTERVAL: 394 MS
QTC INTERVAL: 394 MS
T WAVE AXIS: 34 DEGREES
VENTRICULAR RATE: 60 BPM

## 2019-05-13 PROCEDURE — 93010 ELECTROCARDIOGRAM REPORT: CPT | Performed by: INTERNAL MEDICINE

## 2019-05-16 DIAGNOSIS — E10.65 TYPE 1 DIABETES MELLITUS WITH HYPERGLYCEMIA (HCC): ICD-10-CM

## 2019-05-16 RX ORDER — BLOOD SUGAR DIAGNOSTIC
STRIP MISCELLANEOUS
Qty: 600 EACH | Refills: 0 | Status: SHIPPED | OUTPATIENT
Start: 2019-05-16 | End: 2019-06-05 | Stop reason: SDUPTHER

## 2019-05-16 RX ORDER — BLOOD SUGAR DIAGNOSTIC
STRIP MISCELLANEOUS
Qty: 600 EACH | Refills: 0 | Status: SHIPPED | OUTPATIENT
Start: 2019-05-16 | End: 2019-05-16 | Stop reason: SDUPTHER

## 2019-05-29 ENCOUNTER — TELEPHONE (OUTPATIENT)
Dept: ENDOCRINOLOGY | Facility: CLINIC | Age: 24
End: 2019-05-29

## 2019-06-05 ENCOUNTER — TELEPHONE (OUTPATIENT)
Dept: DIABETES SERVICES | Facility: CLINIC | Age: 24
End: 2019-06-05

## 2019-06-05 ENCOUNTER — TELEPHONE (OUTPATIENT)
Dept: GASTROENTEROLOGY | Facility: AMBULARY SURGERY CENTER | Age: 24
End: 2019-06-05

## 2019-06-05 ENCOUNTER — OFFICE VISIT (OUTPATIENT)
Dept: ENDOCRINOLOGY | Facility: CLINIC | Age: 24
End: 2019-06-05
Payer: COMMERCIAL

## 2019-06-05 VITALS
SYSTOLIC BLOOD PRESSURE: 112 MMHG | WEIGHT: 120 LBS | BODY MASS INDEX: 22.08 KG/M2 | HEIGHT: 62 IN | HEART RATE: 66 BPM | DIASTOLIC BLOOD PRESSURE: 72 MMHG

## 2019-06-05 DIAGNOSIS — E10.65 TYPE 1 DIABETES MELLITUS WITH HYPERGLYCEMIA (HCC): ICD-10-CM

## 2019-06-05 DIAGNOSIS — R79.89 ABNORMAL LIVER FUNCTION TEST: ICD-10-CM

## 2019-06-05 DIAGNOSIS — R19.5 ABNORMAL STOOLS: ICD-10-CM

## 2019-06-05 DIAGNOSIS — E10.9 TYPE 1 DIABETES MELLITUS WITHOUT COMPLICATION (HCC): Primary | ICD-10-CM

## 2019-06-05 DIAGNOSIS — E16.2 HYPOGLYCEMIA: ICD-10-CM

## 2019-06-05 PROCEDURE — 99214 OFFICE O/P EST MOD 30 MIN: CPT | Performed by: PHYSICIAN ASSISTANT

## 2019-06-05 PROCEDURE — 95251 CONT GLUC MNTR ANALYSIS I&R: CPT | Performed by: PHYSICIAN ASSISTANT

## 2019-06-05 RX ORDER — BLOOD SUGAR DIAGNOSTIC
STRIP MISCELLANEOUS
Qty: 600 EACH | Refills: 3 | Status: SHIPPED | OUTPATIENT
Start: 2019-06-05 | End: 2021-11-02

## 2019-06-06 ENCOUNTER — OFFICE VISIT (OUTPATIENT)
Dept: OBGYN CLINIC | Facility: CLINIC | Age: 24
End: 2019-06-06
Payer: COMMERCIAL

## 2019-06-06 VITALS — WEIGHT: 117 LBS | SYSTOLIC BLOOD PRESSURE: 108 MMHG | DIASTOLIC BLOOD PRESSURE: 66 MMHG | BODY MASS INDEX: 21.75 KG/M2

## 2019-06-06 DIAGNOSIS — R35.0 URINARY FREQUENCY: ICD-10-CM

## 2019-06-06 DIAGNOSIS — B96.89 BACTERIAL VAGINITIS: Primary | ICD-10-CM

## 2019-06-06 DIAGNOSIS — N76.0 BACTERIAL VAGINITIS: Primary | ICD-10-CM

## 2019-06-06 PROBLEM — Z01.419 ENCOUNTER FOR GYNECOLOGICAL EXAMINATION (GENERAL) (ROUTINE) WITHOUT ABNORMAL FINDINGS: Status: RESOLVED | Noted: 2019-04-03 | Resolved: 2019-06-06

## 2019-06-06 PROBLEM — Z23 NEED FOR INFLUENZA VACCINATION: Status: RESOLVED | Noted: 2019-02-07 | Resolved: 2019-06-06

## 2019-06-06 PROBLEM — Z23 NEED FOR PERTUSSIS VACCINATION: Status: RESOLVED | Noted: 2019-02-07 | Resolved: 2019-06-06

## 2019-06-06 LAB
SL AMB  POCT GLUCOSE, UA: ABNORMAL
SL AMB LEUKOCYTE ESTERASE,UA: ABNORMAL
SL AMB POCT BILIRUBIN,UA: ABNORMAL
SL AMB POCT BLOOD,UA: ABNORMAL
SL AMB POCT CLARITY,UA: CLEAR
SL AMB POCT COLOR,UA: YELLOW
SL AMB POCT KETONES,UA: ABNORMAL
SL AMB POCT NITRITE,UA: ABNORMAL
SL AMB POCT PH,UA: ABNORMAL
SL AMB POCT SPECIFIC GRAVITY,UA: ABNORMAL
SL AMB POCT URINE PROTEIN: ABNORMAL
SL AMB POCT UROBILINOGEN: ABNORMAL

## 2019-06-06 PROCEDURE — 99214 OFFICE O/P EST MOD 30 MIN: CPT | Performed by: PHYSICIAN ASSISTANT

## 2019-06-06 PROCEDURE — 87086 URINE CULTURE/COLONY COUNT: CPT | Performed by: PHYSICIAN ASSISTANT

## 2019-06-06 RX ORDER — METRONIDAZOLE 7.5 MG/G
1 GEL VAGINAL
Qty: 70 G | Refills: 0 | Status: SHIPPED | OUTPATIENT
Start: 2019-06-06 | End: 2019-06-11

## 2019-06-07 LAB — BACTERIA UR CULT: NORMAL

## 2019-06-10 ENCOUNTER — APPOINTMENT (OUTPATIENT)
Dept: LAB | Facility: CLINIC | Age: 24
End: 2019-06-10
Payer: COMMERCIAL

## 2019-06-10 DIAGNOSIS — E10.9 TYPE 1 DIABETES MELLITUS WITHOUT COMPLICATION (HCC): ICD-10-CM

## 2019-06-10 DIAGNOSIS — E10.65 TYPE 1 DIABETES MELLITUS WITH HYPERGLYCEMIA (HCC): ICD-10-CM

## 2019-06-10 DIAGNOSIS — R79.89 ABNORMAL LIVER FUNCTION TEST: ICD-10-CM

## 2019-06-10 LAB
ALBUMIN SERPL BCP-MCNC: 3.9 G/DL (ref 3.5–5)
ALP SERPL-CCNC: 64 U/L (ref 46–116)
ALT SERPL W P-5'-P-CCNC: 16 U/L (ref 12–78)
ANION GAP SERPL CALCULATED.3IONS-SCNC: 6 MMOL/L (ref 4–13)
AST SERPL W P-5'-P-CCNC: 14 U/L (ref 5–45)
BASOPHILS # BLD AUTO: 0.02 THOUSANDS/ΜL (ref 0–0.1)
BASOPHILS NFR BLD AUTO: 0 % (ref 0–1)
BILIRUB DIRECT SERPL-MCNC: 0.17 MG/DL (ref 0–0.2)
BILIRUB SERPL-MCNC: 1.2 MG/DL (ref 0.2–1)
BUN SERPL-MCNC: 9 MG/DL (ref 5–25)
CALCIUM SERPL-MCNC: 9.1 MG/DL (ref 8.3–10.1)
CHLORIDE SERPL-SCNC: 103 MMOL/L (ref 100–108)
CO2 SERPL-SCNC: 27 MMOL/L (ref 21–32)
CREAT SERPL-MCNC: 0.83 MG/DL (ref 0.6–1.3)
EOSINOPHIL # BLD AUTO: 0.11 THOUSAND/ΜL (ref 0–0.61)
EOSINOPHIL NFR BLD AUTO: 2 % (ref 0–6)
ERYTHROCYTE [DISTWIDTH] IN BLOOD BY AUTOMATED COUNT: 12.6 % (ref 11.6–15.1)
EST. AVERAGE GLUCOSE BLD GHB EST-MCNC: 137 MG/DL
GFR SERPL CREATININE-BSD FRML MDRD: 100 ML/MIN/1.73SQ M
GLUCOSE SERPL-MCNC: 137 MG/DL (ref 65–140)
HBA1C MFR BLD: 6.4 % (ref 4.2–6.3)
HCT VFR BLD AUTO: 42.2 % (ref 34.8–46.1)
HGB BLD-MCNC: 13.9 G/DL (ref 11.5–15.4)
IMM GRANULOCYTES # BLD AUTO: 0.01 THOUSAND/UL (ref 0–0.2)
IMM GRANULOCYTES NFR BLD AUTO: 0 % (ref 0–2)
LYMPHOCYTES # BLD AUTO: 2.9 THOUSANDS/ΜL (ref 0.6–4.47)
LYMPHOCYTES NFR BLD AUTO: 48 % (ref 14–44)
MCH RBC QN AUTO: 29.5 PG (ref 26.8–34.3)
MCHC RBC AUTO-ENTMCNC: 32.9 G/DL (ref 31.4–37.4)
MCV RBC AUTO: 90 FL (ref 82–98)
MONOCYTES # BLD AUTO: 0.42 THOUSAND/ΜL (ref 0.17–1.22)
MONOCYTES NFR BLD AUTO: 7 % (ref 4–12)
NEUTROPHILS # BLD AUTO: 2.66 THOUSANDS/ΜL (ref 1.85–7.62)
NEUTS SEG NFR BLD AUTO: 43 % (ref 43–75)
NRBC BLD AUTO-RTO: 0 /100 WBCS
PLATELET # BLD AUTO: 200 THOUSANDS/UL (ref 149–390)
PMV BLD AUTO: 11.5 FL (ref 8.9–12.7)
POTASSIUM SERPL-SCNC: 3.7 MMOL/L (ref 3.5–5.3)
PROT SERPL-MCNC: 7.7 G/DL (ref 6.4–8.2)
RBC # BLD AUTO: 4.71 MILLION/UL (ref 3.81–5.12)
SODIUM SERPL-SCNC: 136 MMOL/L (ref 136–145)
WBC # BLD AUTO: 6.12 THOUSAND/UL (ref 4.31–10.16)

## 2019-06-10 PROCEDURE — 80053 COMPREHEN METABOLIC PANEL: CPT

## 2019-06-10 PROCEDURE — 36415 COLL VENOUS BLD VENIPUNCTURE: CPT

## 2019-06-10 PROCEDURE — 85025 COMPLETE CBC W/AUTO DIFF WBC: CPT

## 2019-06-10 PROCEDURE — 83036 HEMOGLOBIN GLYCOSYLATED A1C: CPT

## 2019-06-10 PROCEDURE — 82248 BILIRUBIN DIRECT: CPT

## 2019-06-10 RX ORDER — LANCETS 33 GAUGE
EACH MISCELLANEOUS
Qty: 600 EACH | Refills: 1 | Status: SHIPPED | OUTPATIENT
Start: 2019-06-10 | End: 2021-11-02

## 2019-06-11 ENCOUNTER — TELEPHONE (OUTPATIENT)
Dept: ENDOCRINOLOGY | Facility: CLINIC | Age: 24
End: 2019-06-11

## 2019-06-12 ENCOUNTER — OFFICE VISIT (OUTPATIENT)
Dept: DIABETES SERVICES | Facility: CLINIC | Age: 24
End: 2019-06-12
Payer: COMMERCIAL

## 2019-06-12 DIAGNOSIS — E10.9 TYPE 1 DIABETES MELLITUS WITHOUT COMPLICATION (HCC): ICD-10-CM

## 2019-06-12 PROCEDURE — 98960 EDU&TRN PT SELF-MGMT NQHP 1: CPT

## 2019-06-17 ENCOUNTER — OFFICE VISIT (OUTPATIENT)
Dept: GASTROENTEROLOGY | Facility: AMBULARY SURGERY CENTER | Age: 24
End: 2019-06-17
Payer: COMMERCIAL

## 2019-06-17 VITALS
TEMPERATURE: 98.5 F | BODY MASS INDEX: 22.08 KG/M2 | RESPIRATION RATE: 14 BRPM | HEART RATE: 72 BPM | WEIGHT: 120 LBS | HEIGHT: 62 IN | SYSTOLIC BLOOD PRESSURE: 102 MMHG | DIASTOLIC BLOOD PRESSURE: 60 MMHG

## 2019-06-17 DIAGNOSIS — R19.5 ABNORMAL STOOLS: ICD-10-CM

## 2019-06-17 DIAGNOSIS — R17 SERUM TOTAL BILIRUBIN ELEVATED: ICD-10-CM

## 2019-06-17 DIAGNOSIS — R10.11 RIGHT UPPER QUADRANT PAIN: Primary | ICD-10-CM

## 2019-06-17 PROCEDURE — 99244 OFF/OP CNSLTJ NEW/EST MOD 40: CPT | Performed by: INTERNAL MEDICINE

## 2019-06-17 RX ORDER — PANTOPRAZOLE SODIUM 40 MG/1
40 TABLET, DELAYED RELEASE ORAL DAILY
Qty: 30 TABLET | Refills: 0 | Status: SHIPPED | OUTPATIENT
Start: 2019-06-17 | End: 2019-10-25 | Stop reason: ALTCHOICE

## 2019-06-17 RX ORDER — DICYCLOMINE HYDROCHLORIDE 10 MG/1
10 CAPSULE ORAL 3 TIMES DAILY PRN
Qty: 60 CAPSULE | Refills: 0 | Status: SHIPPED | OUTPATIENT
Start: 2019-06-17 | End: 2020-06-19 | Stop reason: ALTCHOICE

## 2019-06-25 ENCOUNTER — TELEPHONE (OUTPATIENT)
Dept: INTERNAL MEDICINE CLINIC | Facility: CLINIC | Age: 24
End: 2019-06-25

## 2019-06-26 ENCOUNTER — APPOINTMENT (OUTPATIENT)
Dept: LAB | Facility: HOSPITAL | Age: 24
End: 2019-06-26
Payer: COMMERCIAL

## 2019-06-26 ENCOUNTER — OFFICE VISIT (OUTPATIENT)
Dept: DIABETES SERVICES | Facility: CLINIC | Age: 24
End: 2019-06-26

## 2019-06-26 ENCOUNTER — TRANSCRIBE ORDERS (OUTPATIENT)
Dept: LAB | Facility: HOSPITAL | Age: 24
End: 2019-06-26

## 2019-06-26 DIAGNOSIS — E80.6 HYPERBILIRUBINEMIA: ICD-10-CM

## 2019-06-26 DIAGNOSIS — E10.65 TYPE 1 DIABETES MELLITUS WITH HYPERGLYCEMIA (HCC): ICD-10-CM

## 2019-06-26 DIAGNOSIS — E80.6 HYPERBILIRUBINEMIA: Primary | ICD-10-CM

## 2019-06-26 DIAGNOSIS — S06.0X0S CONCUSSION WITHOUT LOSS OF CONSCIOUSNESS, SEQUELA (HCC): Primary | ICD-10-CM

## 2019-06-26 LAB
BILIRUB DIRECT SERPL-MCNC: 0.19 MG/DL (ref 0–0.2)
GGT SERPL-CCNC: 10 U/L (ref 5–85)

## 2019-06-26 PROCEDURE — TBPUSH

## 2019-06-26 PROCEDURE — 82977 ASSAY OF GGT: CPT

## 2019-06-26 PROCEDURE — 82248 BILIRUBIN DIRECT: CPT

## 2019-06-26 PROCEDURE — 36415 COLL VENOUS BLD VENIPUNCTURE: CPT

## 2019-06-27 ENCOUNTER — APPOINTMENT (OUTPATIENT)
Dept: LAB | Facility: HOSPITAL | Age: 24
End: 2019-06-27
Payer: COMMERCIAL

## 2019-06-27 ENCOUNTER — TRANSCRIBE ORDERS (OUTPATIENT)
Dept: LAB | Facility: HOSPITAL | Age: 24
End: 2019-06-27

## 2019-06-27 DIAGNOSIS — R19.7 DIARRHEA OF PRESUMED INFECTIOUS ORIGIN: ICD-10-CM

## 2019-06-27 DIAGNOSIS — R19.7 DIARRHEA OF PRESUMED INFECTIOUS ORIGIN: Primary | ICD-10-CM

## 2019-06-27 DIAGNOSIS — B96.81 GASTRIC ULCER DUE TO HELICOBACTER PYLORI, UNSPECIFIED CHRONICITY: ICD-10-CM

## 2019-06-27 DIAGNOSIS — R10.11 ABDOMINAL PAIN, RIGHT UPPER QUADRANT: ICD-10-CM

## 2019-06-27 DIAGNOSIS — K25.9 GASTRIC ULCER DUE TO HELICOBACTER PYLORI, UNSPECIFIED CHRONICITY: ICD-10-CM

## 2019-06-27 LAB — HEMOCCULT STL QL IA: POSITIVE

## 2019-06-27 PROCEDURE — 87209 SMEAR COMPLEX STAIN: CPT

## 2019-06-27 PROCEDURE — 87177 OVA AND PARASITES SMEARS: CPT

## 2019-06-27 PROCEDURE — 82656 EL-1 FECAL QUAL/SEMIQ: CPT

## 2019-06-27 PROCEDURE — G0328 FECAL BLOOD SCRN IMMUNOASSAY: HCPCS

## 2019-06-27 PROCEDURE — 89055 LEUKOCYTE ASSESSMENT FECAL: CPT

## 2019-06-27 PROCEDURE — 87338 HPYLORI STOOL AG IA: CPT

## 2019-06-27 PROCEDURE — 87505 NFCT AGENT DETECTION GI: CPT

## 2019-06-27 PROCEDURE — 87329 GIARDIA AG IA: CPT

## 2019-06-28 LAB
CAMPYLOBACTER DNA SPEC NAA+PROBE: NORMAL
H PYLORI AG STL QL IA: NEGATIVE
SALMONELLA DNA SPEC QL NAA+PROBE: NORMAL
SHIGA TOXIN STX GENE SPEC NAA+PROBE: NORMAL
SHIGELLA DNA SPEC QL NAA+PROBE: NORMAL

## 2019-06-28 PROCEDURE — 88305 TISSUE EXAM BY PATHOLOGIST: CPT | Performed by: PATHOLOGY

## 2019-06-29 LAB — ELASTASE PANC STL-MCNT: >500 UG ELAST./G

## 2019-06-30 LAB — G LAMBLIA AG STL QL IA: NEGATIVE

## 2019-07-01 ENCOUNTER — TELEPHONE (OUTPATIENT)
Dept: GASTROENTEROLOGY | Facility: AMBULARY SURGERY CENTER | Age: 24
End: 2019-07-01

## 2019-07-01 ENCOUNTER — LAB REQUISITION (OUTPATIENT)
Dept: LAB | Facility: HOSPITAL | Age: 24
End: 2019-07-01
Payer: COMMERCIAL

## 2019-07-01 ENCOUNTER — OFFICE VISIT (OUTPATIENT)
Dept: DIABETES SERVICES | Facility: CLINIC | Age: 24
End: 2019-07-01

## 2019-07-01 DIAGNOSIS — K21.9 GASTRO-ESOPHAGEAL REFLUX DISEASE WITHOUT ESOPHAGITIS: ICD-10-CM

## 2019-07-01 DIAGNOSIS — R10.11 RIGHT UPPER QUADRANT PAIN: ICD-10-CM

## 2019-07-01 DIAGNOSIS — E10.65 TYPE 1 DIABETES MELLITUS WITH HYPERGLYCEMIA (HCC): ICD-10-CM

## 2019-07-01 DIAGNOSIS — B96.81 HELICOBACTER PYLORI (H. PYLORI) AS THE CAUSE OF DISEASES CLASSIFIED ELSEWHERE: ICD-10-CM

## 2019-07-01 LAB — O+P STL CONC: NORMAL

## 2019-07-01 PROCEDURE — TPUMPS

## 2019-07-01 NOTE — PATIENT INSTRUCTIONS
If sensor is malfunctioning then do finger sticks and record reading      Contact endo office for problems with glucoses

## 2019-07-01 NOTE — PROGRESS NOTES
BODØ is starting on her Tandem pump today  Her last dose of Theadore Otis was 4 units last night  She was able to fill the reservoir, enter in the setting for her basal/bolus setting,  She is able to demonstrate giving a bolus and stop and resuming her pump  Basal IQ feature was turned on and she is wearing her DexCom G6, she has had some significant differences between her  Sensor and her meter reading and we discussed calibrating when this occurs  She will also contact [employer], she seems to be having more problems with this batch of sensor that she is using  See setting orders  Scheduled foe a set change on 7/3/19  She is to callif she has any problems with high/low reading to the endo office, pump questions to [employer]

## 2019-07-03 ENCOUNTER — OFFICE VISIT (OUTPATIENT)
Dept: DIABETES SERVICES | Facility: CLINIC | Age: 24
End: 2019-07-03

## 2019-07-03 DIAGNOSIS — E10.9 TYPE 1 DIABETES MELLITUS WITHOUT COMPLICATION (HCC): ICD-10-CM

## 2019-07-03 PROBLEM — S06.0X0A CONCUSSION WITHOUT LOSS OF CONSCIOUSNESS: Status: ACTIVE | Noted: 2019-07-02

## 2019-07-03 PROCEDURE — TSETCH

## 2019-07-03 NOTE — TELEPHONE ENCOUNTER
Kiera Caba,    I added the change request to cancel her Colon/EGD    I sent an email to [place] too:)    Thank you,  Álvaro

## 2019-07-03 NOTE — PATIENT INSTRUCTIONS
Change infusion set every 3 days  Call endocrinologist on call if problems with high or low glucoses  Call pump company for questions regarding pump or sensor

## 2019-07-03 NOTE — PROGRESS NOTES
Brady Oliveira is here for a set change on her Tandem pump  Her pump was downloaded and reviewed by Katiana Keepers  No changes were made at this time  Brady Oliveira has turned off the alert and resume for the basal IQ  She does not want to be notified when the pump suspends  She was able to perform a set change successfully  She is hesitant to insert the infusion set but is able to do it independently, she is using the auto soft XC  She is doing well considering that she does not like needles  She beliefs that she just need to become used to it  She has many questions regarding how to handle her pump with various activity's I e swimming, water poe  This will be addressed at a future appointment  I did go over DKA guidelines and she was given DKA handout for pump patients

## 2019-07-10 ENCOUNTER — CLINICAL SUPPORT (OUTPATIENT)
Dept: DIABETES SERVICES | Facility: CLINIC | Age: 24
End: 2019-07-10
Payer: COMMERCIAL

## 2019-07-10 DIAGNOSIS — E10.65 TYPE 1 DIABETES MELLITUS WITH HYPERGLYCEMIA (HCC): ICD-10-CM

## 2019-07-10 PROCEDURE — G0108 DIAB MANAGE TRN  PER INDIV: HCPCS

## 2019-07-10 NOTE — PROGRESS NOTES
Patient was seen for a 1 week follow -up for her tandem pump  She reports that her last set was not inserted properly  She is needle phobic and was hesitant in inserting  She removed the infusion set and gave herself an injection with her pen  Her boyfriend inserted the next set and he will assist her until she is comfortable with the insertion/   Her pump was downloaded and reviewed by Greta Douglas  Leatha Meza is having elevations in her glucose after her meals  Her insulin to carb ratio was changed to 1:16  She was able to make this change  At this session I discussed  with her disconnecting for short periods of time 3 - 4 hours and taking insulin via the pump for coverage  We also discussed and she was shown how to use the temp basal, and was given information sheets on back plan , DKA prevention and steps to follow if she has ketones  She does not restart her sensor on the day of the sensor change until she showers so there will be a gap in her information  I have asked her to try and refrain from snacks but if she does then to cover with a bolus dose  She reports being hunger but is avoiding dairy ie cheese and is limited in the vegetable that she consumes  I have asked her to send in a download in 1 week  AVS was not avialvble at this visit   She was given verbal instructions

## 2019-07-23 ENCOUNTER — TELEPHONE (OUTPATIENT)
Dept: INTERNAL MEDICINE CLINIC | Facility: CLINIC | Age: 24
End: 2019-07-23

## 2019-07-23 DIAGNOSIS — F43.23 ADJUSTMENT REACTION WITH ANXIETY AND DEPRESSION: Primary | ICD-10-CM

## 2019-07-23 RX ORDER — DULOXETIN HYDROCHLORIDE 30 MG/1
CAPSULE, DELAYED RELEASE ORAL
Qty: 60 CAPSULE | Refills: 0 | Status: SHIPPED | OUTPATIENT
Start: 2019-07-23 | End: 2019-10-15 | Stop reason: SINTOL

## 2019-07-23 NOTE — TELEPHONE ENCOUNTER
I called Riki Quiros and would like her to come in and be seen for a f/u in 2 weeks  Would you call her to schedule? Thanks

## 2019-07-23 NOTE — PROGRESS NOTES
Spoke with patient regarding worsening symptoms of anxiety and depression over the past several weeks  Since diagnosis made early this year of DM I, pt has been struggling with anger and depression and anxiety  She called because her family is concerned  She states she is mentally unstable and has been having emotional outbursts  She is not suicidal but she has ripped out her insulin pump and her boyfriend reconnected her and has been giving her insulin because she has not wanted to do this herself  She is not able to seek inpatient psychiatric care because she currently has too many medical expenses  Previous psych referrals have not been followed through on because appointments are not available for several months  She is given an rx for duloxetine and given the name and number of a private counselor  She is urged to go to the ER if she develops any suicidal or homicidal thoughts  She will schedule for f/u with me in 2 weeks

## 2019-07-24 ENCOUNTER — TELEPHONE (OUTPATIENT)
Dept: ENDOCRINOLOGY | Facility: CLINIC | Age: 24
End: 2019-07-24

## 2019-07-24 NOTE — TELEPHONE ENCOUNTER
Pump download reviewed  Overall looks good with some highs, rare lows    Continue the same settings and review in detail at 8/5 visit

## 2019-07-26 ENCOUNTER — TELEPHONE (OUTPATIENT)
Dept: ENDOCRINOLOGY | Facility: CLINIC | Age: 24
End: 2019-07-26

## 2019-07-30 ENCOUNTER — TELEPHONE (OUTPATIENT)
Dept: ENDOCRINOLOGY | Facility: CLINIC | Age: 24
End: 2019-07-30

## 2019-07-31 ENCOUNTER — TELEPHONE (OUTPATIENT)
Dept: DIABETES SERVICES | Facility: CLINIC | Age: 24
End: 2019-07-31

## 2019-07-31 DIAGNOSIS — E10.65 TYPE 1 DIABETES MELLITUS WITH HYPERGLYCEMIA (HCC): ICD-10-CM

## 2019-07-31 NOTE — TELEPHONE ENCOUNTER
Brady Oliveira has had problems with obtaining her pump supplies and is currently off her pump  She has gone back to her prior basal and bolus insulin  She is requesting a script be send to her pharmacy for Novolog pens

## 2019-08-06 ENCOUNTER — OFFICE VISIT (OUTPATIENT)
Dept: INTERNAL MEDICINE CLINIC | Facility: CLINIC | Age: 24
End: 2019-08-06
Payer: COMMERCIAL

## 2019-08-06 VITALS
DIASTOLIC BLOOD PRESSURE: 74 MMHG | WEIGHT: 122.2 LBS | OXYGEN SATURATION: 98 % | TEMPERATURE: 97.7 F | SYSTOLIC BLOOD PRESSURE: 102 MMHG | HEART RATE: 84 BPM | HEIGHT: 62 IN | RESPIRATION RATE: 17 BRPM | BODY MASS INDEX: 22.49 KG/M2

## 2019-08-06 DIAGNOSIS — R45.1 AGITATION: ICD-10-CM

## 2019-08-06 DIAGNOSIS — R11.0 NAUSEA: ICD-10-CM

## 2019-08-06 DIAGNOSIS — F32.A DEPRESSION, UNSPECIFIED DEPRESSION TYPE: Primary | ICD-10-CM

## 2019-08-06 DIAGNOSIS — F41.8 DEPRESSION WITH ANXIETY: ICD-10-CM

## 2019-08-06 DIAGNOSIS — E10.65 TYPE 1 DIABETES MELLITUS WITH HYPERGLYCEMIA (HCC): ICD-10-CM

## 2019-08-06 PROCEDURE — 99214 OFFICE O/P EST MOD 30 MIN: CPT | Performed by: NURSE PRACTITIONER

## 2019-08-06 PROCEDURE — 1036F TOBACCO NON-USER: CPT | Performed by: NURSE PRACTITIONER

## 2019-08-06 PROCEDURE — 3008F BODY MASS INDEX DOCD: CPT | Performed by: NURSE PRACTITIONER

## 2019-08-06 RX ORDER — LORAZEPAM 0.5 MG/1
0.5 TABLET ORAL EVERY 8 HOURS PRN
Qty: 7 TABLET | Refills: 0 | Status: SHIPPED | OUTPATIENT
Start: 2019-08-06 | End: 2020-02-27 | Stop reason: SDUPTHER

## 2019-08-06 RX ORDER — ONDANSETRON 4 MG/1
4 TABLET, FILM COATED ORAL EVERY 8 HOURS PRN
Qty: 20 TABLET | Refills: 0 | Status: SHIPPED | OUTPATIENT
Start: 2019-08-06 | End: 2020-06-19 | Stop reason: ALTCHOICE

## 2019-08-06 NOTE — ASSESSMENT & PLAN NOTE
Episodes of agitation and anger continue with outbursts and at times put herself at risk of harm with removal of her insulin pump and refusing to take her insulin  She is very frustrated and angry at her diagnosis and having a chronic illness  We are attempting to manage depression/anxiety as noted  Have also given her a small supply of lorazepam to take when outbursts occur    Reviewed appropriate use and cautions about misuse and abuse potential

## 2019-08-06 NOTE — ASSESSMENT & PLAN NOTE
Lab Results   Component Value Date    HGBA1C 6 4 (H) 06/10/2019   Pt reports that she has been having trouble with her blood sugar stating that it goes up with every meal and has dropped down overnight with her waking at times in the mornings with her sugar at 40  She is frustrated because she does not feel like she knows what she is doing with her insulin pump and she feels overwhelmed  She is also frustrated that her endocrinologist "does not listen "  She states she has expressed her concerns to them and she says they respond by telling her that her a1c is good  She is interested to find another endocrinologist   She is able to pursue treatment with another endocrinologist if she chooses but may have to look out of network  For the time being, she is encouraged to continue with treatment as prescribed and continue with her f/u  Will attempt to reach out to diabetes education to see if they are able to assist her with some of her concerns and confusion  No results for input(s): POCGLU in the last 72 hours      Blood Sugar Average: Last 72 hrs:

## 2019-08-06 NOTE — ASSESSMENT & PLAN NOTE
Pt is experiencing mild nausea with 30 mg duloxetine and vomiting with 60 mg duloxetine  She has tried many other medications previously without success  She would like to continue to try duloxetine and I have given her zofran to take 30 minutes prior to taking duloxetine  If tolerated, should increase to 60 mg again  Call if no improvement  I have also referred her to [facility] and psychiatry

## 2019-08-06 NOTE — PROGRESS NOTES
Assessment/Plan:  I have spent 30 minutes with Patient  today in which greater than 50% of this time was spent in counseling/coordination of care regarding Prognosis, Risks and benefits of tx options, Intructions for management, Patient and family education, Importance of tx compliance, Risk factor reductions and Impressions  Type 1 diabetes mellitus with hyperglycemia (HCC)  Lab Results   Component Value Date    HGBA1C 6 4 (H) 06/10/2019   Pt reports that she has been having trouble with her blood sugar stating that it goes up with every meal and has dropped down overnight with her waking at times in the mornings with her sugar at 40  She is frustrated because she does not feel like she knows what she is doing with her insulin pump and she feels overwhelmed  She is also frustrated that her endocrinologist "does not listen "  She states she has expressed her concerns to them and she says they respond by telling her that her a1c is good  She is interested to find another endocrinologist   She is able to pursue treatment with another endocrinologist if she chooses but may have to look out of network  For the time being, she is encouraged to continue with treatment as prescribed and continue with her f/u  Will attempt to reach out to diabetes education to see if they are able to assist her with some of her concerns and confusion  No results for input(s): POCGLU in the last 72 hours  Blood Sugar Average: Last 72 hrs:      Agitation  Episodes of agitation and anger continue with outbursts and at times put herself at risk of harm with removal of her insulin pump and refusing to take her insulin  She is very frustrated and angry at her diagnosis and having a chronic illness  We are attempting to manage depression/anxiety as noted  Have also given her a small supply of lorazepam to take when outbursts occur    Reviewed appropriate use and cautions about misuse and abuse potential     Depression with anxiety  Pt is experiencing mild nausea with 30 mg duloxetine and vomiting with 60 mg duloxetine  She has tried many other medications previously without success  She would like to continue to try duloxetine and I have given her zofran to take 30 minutes prior to taking duloxetine  If tolerated, should increase to 60 mg again  Call if no improvement  I have also referred her to [facility] and psychiatry  Diagnoses and all orders for this visit:    Depression, unspecified depression type  -     Ambulatory referral to Psychiatry; Future  -     Ambulatory referral to [facility]; Future    Nausea  -     ondansetron (ZOFRAN) 4 mg tablet; Take 1 tablet (4 mg total) by mouth every 8 (eight) hours as needed for nausea or vomiting    Agitation  -     LORazepam (ATIVAN) 0 5 mg tablet; Take 1 tablet (0 5 mg total) by mouth every 8 (eight) hours as needed for anxiety    Type 1 diabetes mellitus with hyperglycemia ([address] )    Depression with anxiety    Other orders  -     Cancel: Ambulatory referral to Endocrinology; Future          Subjective:      Patient ID: Josh Cushing is a 27 y o  female  Pt is a 28 y/o female here to f/u for management of depression and anxiety  She called the office on 7/23 complaining of severe and worsening symptoms in recent weeks  She has been having a very difficult time coping with her diagnosis  She also has family problems and has recently "" her parents  Additionally with her recent medical problems she has incurred a lot of costly medical bills and has expensive medications to pay for  She is also a full time student  She reports emotional outbursts of tearfulness, anger, and has pulled out her insulin pump and refused to take her insulin  After discussing treatment options over the phone, she is not able to afford inpatient psychiatric care and we started her on duloxetine    She states today that she has been taking this but it is causing her to be nauseated  When she tried to titrate up to the 60 mg dose, she was vomiting so she went back to 30 mg  She does not feel that this is helping her at all, as expected since it has only been about 2 weeks  She continues with her depression and episodes of anger and anxiety  She says these episodes are at times gradual in onset but at times occur without warning and she does not always recall what happens during episodes  Denies suicidal ideation  The following portions of the patient's history were reviewed and updated as appropriate: allergies, current medications, past family history, past medical history, past social history, past surgical history and problem list     Review of Systems   Gastrointestinal: Positive for abdominal pain and nausea  Psychiatric/Behavioral: Positive for agitation, behavioral problems and dysphoric mood  Negative for suicidal ideas  The patient is nervous/anxious  Objective:      /74 (BP Location: Left arm, Patient Position: Sitting)   Pulse 84   Temp 97 7 °F (36 5 °C) (Tympanic)   Resp 17   Ht 5' 1 5" (1 562 m)   Wt 55 4 kg (122 lb 3 2 oz)   SpO2 98%   BMI 22 72 kg/m²          Physical Exam   Constitutional: She is oriented to person, place, and time  Vital signs are normal  She appears well-developed and well-nourished  She is cooperative  Cardiovascular: Normal rate and regular rhythm  Pulmonary/Chest: Effort normal    Neurological: She is alert and oriented to person, place, and time  Psychiatric: Her speech is normal and behavior is normal  Judgment and thought content normal  Cognition and memory are normal  She exhibits a depressed mood  Calm and appropriate during visit though does appear depressed  Vitals reviewed

## 2019-08-08 ENCOUNTER — SOCIAL WORK (OUTPATIENT)
Dept: BEHAVIORAL/MENTAL HEALTH CLINIC | Facility: CLINIC | Age: 24
End: 2019-08-08
Payer: COMMERCIAL

## 2019-08-08 DIAGNOSIS — F41.8 DEPRESSION WITH ANXIETY: Primary | ICD-10-CM

## 2019-08-08 LAB — WBC SPEC QL GRAM STN: NORMAL

## 2019-08-08 PROCEDURE — 90834 PSYTX W PT 45 MINUTES: CPT | Performed by: SOCIAL WORKER
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
## 2019-08-09 ENCOUNTER — TELEPHONE (OUTPATIENT)
Dept: BEHAVIORAL/MENTAL HEALTH CLINIC | Facility: CLINIC | Age: 24
End: 2019-08-09
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
## 2019-08-12 ENCOUNTER — SOCIAL WORK (OUTPATIENT)
Dept: BEHAVIORAL/MENTAL HEALTH CLINIC | Facility: CLINIC | Age: 24
End: 2019-08-12
Payer: COMMERCIAL

## 2019-08-12 ENCOUNTER — TELEPHONE (OUTPATIENT)
Dept: DIABETES SERVICES | Facility: CLINIC | Age: 24
End: 2019-08-12

## 2019-08-12 DIAGNOSIS — F41.1 GAD (GENERALIZED ANXIETY DISORDER): ICD-10-CM

## 2019-08-12 DIAGNOSIS — F33.1 MODERATE RECURRENT MAJOR DEPRESSION (HCC): ICD-10-CM

## 2019-08-12 DIAGNOSIS — F43.12 CHRONIC POST-TRAUMATIC STRESS DISORDER (PTSD): Primary | ICD-10-CM

## 2019-08-12 PROCEDURE — 90791 PSYCH DIAGNOSTIC EVALUATION: CPT | Performed by: SOCIAL WORKER

## 2019-08-12 NOTE — TELEPHONE ENCOUNTER
Wellington Bill,  Just wanted to let you know our Nurse Educator Toña Cool followed up with Angel Gonzalez  She cancelled her appt with me on 8/5  She is scheduled to see Dr Jermaine Leon 9/10      Thanks,  E  RAVI Gonzalez

## 2019-08-12 NOTE — BH TREATMENT PLAN
Baudilio Pascale  1995       Date of Initial Treatment Plan: 8/12/19  Date of Current Treatment Plan: 08/12/19      Treatment Plan Number 1     Strengths/Personal Resources for Self Care:    Diagnosis:   1  Chronic post-traumatic stress disorder (PTSD)     2  Moderate recurrent major depression ([address] )     3  FRANCIS (generalized anxiety disorder)         Area of Needs:  Trauma, 'I'm a hypochondriac", intimacy, self worth, complex medical issues    Long Term Goal 1:   I have address my trauma  Target Date: 12/4/19  Completion Date:      Short Term Objectives for Goal 1:   1  I no longer live in fear  2  I have forgiven my mom  3  I have my independence  4  I have more self worth    Long Term Goal 2:   My medical issues are under control  Target Date: 12/4/19  Completion Date:     Short Term Objectives for Goal 2:    1  My service dog has come   2  My stomach issues/Diabetes         3  I have my independence              GOAL 1: Modality: Individual Therapy 1x/week   Completion Date:                                  Individuals responsible for goals: Christiane    GOAL 2: Modality:Individual Therapy 1x/week   Completion Date:                                  Individuals responsible for goals: Lucila [facility] : Diagnosis and Treatment Plan explained to Maurice Gonzalez relates understanding diagnosis and is agreeable to Treatment Plan         Client Comments : Please share your thoughts, feelings, need and/or experiences regarding your treatment plan:
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
## 2019-08-12 NOTE — TELEPHONE ENCOUNTER
Message  from her physician regarding assistance on education that can be provided regarding her pump  I contact Rayshawn Javier to find out if she would like to come in to review any information  She declined  She is currently off her pump and continues to use her insulin pens  She reports her pump supplies should be coming this week  She had received replacemnt  supplies for her sensor  I suggested that if she is having problems with her glucose reading and need adjustmenst to contactl the endo  I also told her to call me if there is anything I can assist her with

## 2019-09-04 ENCOUNTER — SOCIAL WORK (OUTPATIENT)
Dept: BEHAVIORAL/MENTAL HEALTH CLINIC | Facility: CLINIC | Age: 24
End: 2019-09-04
Payer: COMMERCIAL

## 2019-09-04 DIAGNOSIS — F33.1 MODERATE RECURRENT MAJOR DEPRESSION (HCC): ICD-10-CM

## 2019-09-04 DIAGNOSIS — F41.1 GAD (GENERALIZED ANXIETY DISORDER): ICD-10-CM

## 2019-09-04 DIAGNOSIS — F43.12 CHRONIC POST-TRAUMATIC STRESS DISORDER (PTSD): Primary | ICD-10-CM

## 2019-09-04 PROCEDURE — 90834 PSYTX W PT 45 MINUTES: CPT | Performed by: SOCIAL WORKER
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
## 2019-09-05 ENCOUNTER — APPOINTMENT (OUTPATIENT)
Dept: LAB | Facility: CLINIC | Age: 24
End: 2019-09-05
Payer: COMMERCIAL

## 2019-09-05 ENCOUNTER — TRANSCRIBE ORDERS (OUTPATIENT)
Dept: LAB | Facility: CLINIC | Age: 24
End: 2019-09-05

## 2019-09-05 DIAGNOSIS — E66.8 OBESITY OF ENDOCRINE ORIGIN: ICD-10-CM

## 2019-09-05 DIAGNOSIS — E10.8 TYPE I DIABETES MELLITUS WITH MANIFESTATIONS (HCC): ICD-10-CM

## 2019-09-05 DIAGNOSIS — E10.8 TYPE I DIABETES MELLITUS WITH MANIFESTATIONS (HCC): Primary | ICD-10-CM

## 2019-09-05 LAB
ALBUMIN SERPL BCP-MCNC: 3.8 G/DL (ref 3.5–5)
ALP SERPL-CCNC: 72 U/L (ref 46–116)
ALT SERPL W P-5'-P-CCNC: 16 U/L (ref 12–78)
AMYLASE SERPL-CCNC: 57 IU/L (ref 25–115)
ANION GAP SERPL CALCULATED.3IONS-SCNC: 11 MMOL/L (ref 4–13)
AST SERPL W P-5'-P-CCNC: 17 U/L (ref 5–45)
BACTERIA UR QL AUTO: ABNORMAL /HPF
BASOPHILS # BLD AUTO: 0.03 THOUSANDS/ΜL (ref 0–0.1)
BASOPHILS NFR BLD AUTO: 0 % (ref 0–1)
BILIRUB SERPL-MCNC: 0.6 MG/DL (ref 0.2–1)
BILIRUB UR QL STRIP: NEGATIVE
BUN SERPL-MCNC: 17 MG/DL (ref 5–25)
CALCIUM SERPL-MCNC: 9 MG/DL (ref 8.3–10.1)
CHLORIDE SERPL-SCNC: 102 MMOL/L (ref 100–108)
CHOLEST SERPL-MCNC: 213 MG/DL (ref 50–200)
CLARITY UR: CLEAR
CO2 SERPL-SCNC: 24 MMOL/L (ref 21–32)
COLOR UR: YELLOW
CORTIS AM PEAK SERPL-MCNC: 26.8 UG/DL (ref 4.2–22.4)
CREAT SERPL-MCNC: 0.81 MG/DL (ref 0.6–1.3)
CREAT UR-MCNC: 146 MG/DL
EOSINOPHIL # BLD AUTO: 0.2 THOUSAND/ΜL (ref 0–0.61)
EOSINOPHIL NFR BLD AUTO: 2 % (ref 0–6)
ERYTHROCYTE [DISTWIDTH] IN BLOOD BY AUTOMATED COUNT: 12.5 % (ref 11.6–15.1)
EST. AVERAGE GLUCOSE BLD GHB EST-MCNC: 154 MG/DL
GFR SERPL CREATININE-BSD FRML MDRD: 103 ML/MIN/1.73SQ M
GLUCOSE P FAST SERPL-MCNC: 127 MG/DL (ref 65–99)
GLUCOSE UR STRIP-MCNC: NEGATIVE MG/DL
HBA1C MFR BLD: 7 % (ref 4.2–6.3)
HCT VFR BLD AUTO: 43.8 % (ref 34.8–46.1)
HDLC SERPL-MCNC: 54 MG/DL (ref 40–60)
HGB BLD-MCNC: 14 G/DL (ref 11.5–15.4)
HGB UR QL STRIP.AUTO: NEGATIVE
IMM GRANULOCYTES # BLD AUTO: 0.04 THOUSAND/UL (ref 0–0.2)
IMM GRANULOCYTES NFR BLD AUTO: 1 % (ref 0–2)
KETONES UR STRIP-MCNC: NEGATIVE MG/DL
LDLC SERPL CALC-MCNC: 129 MG/DL (ref 0–100)
LEUKOCYTE ESTERASE UR QL STRIP: ABNORMAL
LIPASE SERPL-CCNC: 170 U/L (ref 73–393)
LYMPHOCYTES # BLD AUTO: 4.22 THOUSANDS/ΜL (ref 0.6–4.47)
LYMPHOCYTES NFR BLD AUTO: 51 % (ref 14–44)
MAGNESIUM SERPL-MCNC: 2 MG/DL (ref 1.6–2.6)
MCH RBC QN AUTO: 28.7 PG (ref 26.8–34.3)
MCHC RBC AUTO-ENTMCNC: 32 G/DL (ref 31.4–37.4)
MCV RBC AUTO: 90 FL (ref 82–98)
MICROALBUMIN UR-MCNC: 5.9 MG/L (ref 0–20)
MICROALBUMIN/CREAT 24H UR: 4 MG/G CREATININE (ref 0–30)
MONOCYTES # BLD AUTO: 0.69 THOUSAND/ΜL (ref 0.17–1.22)
MONOCYTES NFR BLD AUTO: 8 % (ref 4–12)
MUCOUS THREADS UR QL AUTO: ABNORMAL
NEUTROPHILS # BLD AUTO: 3.17 THOUSANDS/ΜL (ref 1.85–7.62)
NEUTS SEG NFR BLD AUTO: 38 % (ref 43–75)
NITRITE UR QL STRIP: NEGATIVE
NON-SQ EPI CELLS URNS QL MICRO: ABNORMAL /HPF
NONHDLC SERPL-MCNC: 159 MG/DL
NRBC BLD AUTO-RTO: 0 /100 WBCS
PH UR STRIP.AUTO: 7 [PH]
PHOSPHATE SERPL-MCNC: 4 MG/DL (ref 2.7–4.5)
PLATELET # BLD AUTO: 199 THOUSANDS/UL (ref 149–390)
PMV BLD AUTO: 11.8 FL (ref 8.9–12.7)
POTASSIUM SERPL-SCNC: 3.6 MMOL/L (ref 3.5–5.3)
PROT SERPL-MCNC: 7.9 G/DL (ref 6.4–8.2)
PROT UR STRIP-MCNC: NEGATIVE MG/DL
RBC # BLD AUTO: 4.88 MILLION/UL (ref 3.81–5.12)
RBC #/AREA URNS AUTO: ABNORMAL /HPF
SODIUM SERPL-SCNC: 137 MMOL/L (ref 136–145)
SP GR UR STRIP.AUTO: 1.01 (ref 1–1.03)
T4 FREE SERPL-MCNC: 1 NG/DL (ref 0.76–1.46)
TRIGL SERPL-MCNC: 151 MG/DL
TSH SERPL DL<=0.05 MIU/L-ACNC: 4.62 UIU/ML (ref 0.36–3.74)
UROBILINOGEN UR QL STRIP.AUTO: 0.2 E.U./DL
WBC # BLD AUTO: 8.35 THOUSAND/UL (ref 4.31–10.16)
WBC #/AREA URNS AUTO: ABNORMAL /HPF

## 2019-09-05 PROCEDURE — 36415 COLL VENOUS BLD VENIPUNCTURE: CPT

## 2019-09-05 PROCEDURE — 83036 HEMOGLOBIN GLYCOSYLATED A1C: CPT

## 2019-09-05 PROCEDURE — 84681 ASSAY OF C-PEPTIDE: CPT

## 2019-09-05 PROCEDURE — 84443 ASSAY THYROID STIM HORMONE: CPT

## 2019-09-05 PROCEDURE — 82570 ASSAY OF URINE CREATININE: CPT | Performed by: SPECIALIST

## 2019-09-05 PROCEDURE — 81001 URINALYSIS AUTO W/SCOPE: CPT | Performed by: SPECIALIST

## 2019-09-05 PROCEDURE — 82024 ASSAY OF ACTH: CPT

## 2019-09-05 PROCEDURE — 84439 ASSAY OF FREE THYROXINE: CPT

## 2019-09-05 PROCEDURE — 86430 RHEUMATOID FACTOR TEST QUAL: CPT

## 2019-09-05 PROCEDURE — 80053 COMPREHEN METABOLIC PANEL: CPT

## 2019-09-05 PROCEDURE — 83835 ASSAY OF METANEPHRINES: CPT

## 2019-09-05 PROCEDURE — 83690 ASSAY OF LIPASE: CPT

## 2019-09-05 PROCEDURE — 80061 LIPID PANEL: CPT

## 2019-09-05 PROCEDURE — 82043 UR ALBUMIN QUANTITATIVE: CPT | Performed by: SPECIALIST

## 2019-09-05 PROCEDURE — 82533 TOTAL CORTISOL: CPT

## 2019-09-05 PROCEDURE — 84100 ASSAY OF PHOSPHORUS: CPT

## 2019-09-05 PROCEDURE — 84206 ASSAY OF PROINSULIN: CPT

## 2019-09-05 PROCEDURE — 86337 INSULIN ANTIBODIES: CPT

## 2019-09-05 PROCEDURE — 82150 ASSAY OF AMYLASE: CPT

## 2019-09-05 PROCEDURE — 83735 ASSAY OF MAGNESIUM: CPT

## 2019-09-05 PROCEDURE — 85025 COMPLETE CBC W/AUTO DIFF WBC: CPT

## 2019-09-06 LAB
ACTH PLAS-MCNC: 28.7 PG/ML (ref 7.2–63.3)
C PEPTIDE SERPL-MCNC: 1.7 NG/ML (ref 1.1–4.4)
RHEUMATOID FACT SER QL LA: NEGATIVE

## 2019-09-08 LAB
METANEPH FREE SERPL-MCNC: 24 PG/ML (ref 0–62)
NORMETANEPHRINE SERPL-MCNC: 37 PG/ML (ref 0–145)

## 2019-09-09 LAB — PROINSULIN SERPL-SCNC: 8.1 PMOL/L (ref 0–10)

## 2019-09-10 ENCOUNTER — OFFICE VISIT (OUTPATIENT)
Dept: INTERNAL MEDICINE CLINIC | Facility: CLINIC | Age: 24
End: 2019-09-10
Payer: COMMERCIAL

## 2019-09-10 VITALS
SYSTOLIC BLOOD PRESSURE: 110 MMHG | RESPIRATION RATE: 18 BRPM | OXYGEN SATURATION: 99 % | WEIGHT: 124 LBS | HEART RATE: 87 BPM | HEIGHT: 62 IN | TEMPERATURE: 97.8 F | DIASTOLIC BLOOD PRESSURE: 64 MMHG | BODY MASS INDEX: 22.82 KG/M2

## 2019-09-10 DIAGNOSIS — E10.65 TYPE 1 DIABETES MELLITUS WITH HYPERGLYCEMIA (HCC): ICD-10-CM

## 2019-09-10 DIAGNOSIS — R68.89 ACTIVITY INTOLERANCE: ICD-10-CM

## 2019-09-10 DIAGNOSIS — R07.9 CHEST PAIN, UNSPECIFIED TYPE: Primary | ICD-10-CM

## 2019-09-10 DIAGNOSIS — F41.8 DEPRESSION WITH ANXIETY: ICD-10-CM

## 2019-09-10 PROCEDURE — 93000 ELECTROCARDIOGRAM COMPLETE: CPT | Performed by: NURSE PRACTITIONER

## 2019-09-10 PROCEDURE — 99214 OFFICE O/P EST MOD 30 MIN: CPT | Performed by: NURSE PRACTITIONER

## 2019-09-10 RX ORDER — AMOXICILLIN 500 MG/1
500 TABLET, FILM COATED ORAL 3 TIMES DAILY
COMMUNITY
Start: 2019-09-06 | End: 2019-10-25 | Stop reason: ALTCHOICE

## 2019-09-10 NOTE — PROGRESS NOTES
Assessment/Plan:    Type 1 diabetes mellitus with hyperglycemia (HCC)  Lab Results   Component Value Date    HGBA1C 7 0 (H) 09/05/2019     Blood sugars remain labile and unpredictable  Currently off of her insulin pump and using insulin pens per Dr Monty Mayo  He has her holding any correction of hyperglycemia for the time being  He has ordered numerous blood tests for further work up and she is scheduled for f/u with him already  Will defer to his recommendations  No results for input(s): POCGLU in the last 72 hours  Blood Sugar Average: Last 72 hrs:      Depression with anxiety  Pt is now under the care of psych and is working with a psychologist   She is not having any benefit of duloxetine and it is causing nausea so she may wean off with change to every other day for a week then she may stop it  She was told by her psychologist that if no progress made with the duloxetine, would get her in with psychiatry  Suggested she ask for this to be arranged  She has tried multiple other meds in the past and either did not tolerate them or they had no effect  Would appreciate their recommendations  Activity intolerance  Normal EKG in the office, no murmurs appreciated  Pt to get an echo stress test for further evaluation  Will review the report and f/u as indicated and adjust work up or treatment plan based on the result  Call or go to er if symptoms worsen  She will return to the office in 6 weeks  Diagnoses and all orders for this visit:    Chest pain, unspecified type  -     POCT ECG  -     Cancel: Echo complete with contrast if indicated; Future  -     Echo stress test w contrast if indicated; Future    Activity intolerance  -     Cancel: Echo complete with contrast if indicated; Future  -     Echo stress test w contrast if indicated; Future    Type 1 diabetes mellitus with hyperglycemia (HCC)    Depression with anxiety    Other orders  -     amoxicillin (AMOXIL) 500 MG tablet;  Take 500 mg by mouth 3 (three) times a day          Subjective:      Patient ID: Davion Metz is a 27 y o  female  Pt is a 26 y/o female here today for f/u to management of depression, anxiety, agitation, and diabetes  She was seen in late July to discuss her emotional concerns  She expressed concern with worsening and severe symptoms of depression and agitation and her family felt that she was not stable  She has been struggling with managing her diabetes and her blood sugars remained labile despite use of her insulin pump  She felt she did not know what she was doing and she felt as though her endocrinologist was not understanding her concerns because she says they just kept telling her that her A1C was good and to continue her treatment  She wanted to see a new endocrinologist and I referred her to see Dr Nora Montero, though initially told he was not taking new patients, she did eventually get in with him and he is in the process of evaluating her  She says he feels there is "something wrong" with her  She is also under evaluation of her GI complaints with bloody, oily stools and she awaits further evaluation with a new GI doctor in the coming month  For her emotional concerns, she was started on duloxetine at her previous visit and has not been tolerating the target dose of 60 mg  She has been taking 30 mg, though it has made her nauseated, and she reports that she does not think it is doing anything  She has established with a psychologist who she sees every week  She reports today that for the past 3 weeks she has been experiencing chest pains which are constant as well as reddy and activity intolerance  She is unable to tolerate physical activity  She also reports night sweats and lightheadedness intermittently  No associated headaches, numbness, tingling, weakness, headaches          The following portions of the patient's history were reviewed and updated as appropriate: allergies, current medications, past family history, past medical history, past social history, past surgical history and problem list     Review of Systems   Constitutional: Positive for activity change and fatigue  Negative for appetite change, chills, fever and unexpected weight change  HENT: Negative for hearing loss  Eyes: Positive for visual disturbance  Respiratory: Positive for shortness of breath  Negative for cough and chest tightness  Cardiovascular: Positive for chest pain  Negative for palpitations and leg swelling  Gastrointestinal: Positive for abdominal pain, blood in stool and diarrhea  Negative for constipation, nausea and vomiting  Genitourinary: Negative for dysuria and frequency  Musculoskeletal: Negative for arthralgias and myalgias  Allergic/Immunologic: Negative for environmental allergies  Neurological: Positive for light-headedness  Negative for dizziness, weakness, numbness and headaches  Psychiatric/Behavioral: Positive for dysphoric mood  Negative for sleep disturbance  The patient is nervous/anxious  Objective:      /64 (BP Location: Left arm, Cuff Size: Standard)   Pulse 87   Temp 97 8 °F (36 6 °C)   Resp 18   Ht 5' 1 5" (1 562 m)   Wt 56 2 kg (124 lb)   SpO2 99%   BMI 23 05 kg/m²          Physical Exam   Constitutional: She is oriented to person, place, and time  Vital signs are normal  She appears well-developed and well-nourished  She is cooperative  HENT:   Right Ear: Hearing, tympanic membrane, external ear and ear canal normal    Left Ear: Hearing, tympanic membrane, external ear and ear canal normal    Nose: Nose normal  No mucosal edema  Mouth/Throat: Uvula is midline, oropharynx is clear and moist and mucous membranes are normal    Eyes: Pupils are equal, round, and reactive to light  Conjunctivae and lids are normal    Neck: No JVD present  Carotid bruit is not present     Cardiovascular: Normal rate, regular rhythm, normal heart sounds and intact distal pulses  No murmur heard  Normal ekg   Pulmonary/Chest: Effort normal and breath sounds normal  No respiratory distress  Abdominal: Soft  Normal appearance and bowel sounds are normal  There is no tenderness  Musculoskeletal: Normal range of motion  She exhibits no edema  Lymphadenopathy:     She has no cervical adenopathy  Neurological: She is alert and oriented to person, place, and time  She has normal strength  Skin: Skin is warm, dry and intact  Psychiatric: She has a normal mood and affect  Her speech is normal and behavior is normal  Judgment and thought content normal  Cognition and memory are normal    Vitals reviewed

## 2019-09-10 NOTE — ASSESSMENT & PLAN NOTE
Normal EKG in the office, no murmurs appreciated  Pt to get an echo stress test for further evaluation  Will review the report and f/u as indicated and adjust work up or treatment plan based on the result  Call or go to er if symptoms worsen  She will return to the office in 6 weeks

## 2019-09-10 NOTE — ASSESSMENT & PLAN NOTE
Lab Results   Component Value Date    HGBA1C 7 0 (H) 09/05/2019     Blood sugars remain labile and unpredictable  Currently off of her insulin pump and using insulin pens per Dr Monty Mayo  He has her holding any correction of hyperglycemia for the time being  He has ordered numerous blood tests for further work up and she is scheduled for f/u with him already  Will defer to his recommendations  No results for input(s): POCGLU in the last 72 hours      Blood Sugar Average: Last 72 hrs:

## 2019-09-10 NOTE — ASSESSMENT & PLAN NOTE
Pt is now under the care of psych and is working with a psychologist   She is not having any benefit of duloxetine and it is causing nausea so she may wean off with change to every other day for a week then she may stop it  She was told by her psychologist that if no progress made with the duloxetine, would get her in with psychiatry  Suggested she ask for this to be arranged  She has tried multiple other meds in the past and either did not tolerate them or they had no effect  Would appreciate their recommendations

## 2019-09-11 ENCOUNTER — SOCIAL WORK (OUTPATIENT)
Dept: BEHAVIORAL/MENTAL HEALTH CLINIC | Facility: CLINIC | Age: 24
End: 2019-09-11
Payer: COMMERCIAL

## 2019-09-11 DIAGNOSIS — F33.1 MODERATE RECURRENT MAJOR DEPRESSION (HCC): ICD-10-CM

## 2019-09-11 DIAGNOSIS — F41.1 GAD (GENERALIZED ANXIETY DISORDER): ICD-10-CM

## 2019-09-11 DIAGNOSIS — F43.12 CHRONIC POST-TRAUMATIC STRESS DISORDER (PTSD): Primary | ICD-10-CM

## 2019-09-11 PROCEDURE — 90834 PSYTX W PT 45 MINUTES: CPT | Performed by: SOCIAL WORKER
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
## 2019-09-16 LAB — INSULIN AB SER-ACNC: 526 UU/ML

## 2019-09-18 ENCOUNTER — SOCIAL WORK (OUTPATIENT)
Dept: BEHAVIORAL/MENTAL HEALTH CLINIC | Facility: CLINIC | Age: 24
End: 2019-09-18
Payer: COMMERCIAL

## 2019-09-18 DIAGNOSIS — F33.1 MODERATE RECURRENT MAJOR DEPRESSION (HCC): ICD-10-CM

## 2019-09-18 DIAGNOSIS — F43.12 CHRONIC POST-TRAUMATIC STRESS DISORDER (PTSD): Primary | ICD-10-CM

## 2019-09-18 DIAGNOSIS — F41.1 GAD (GENERALIZED ANXIETY DISORDER): ICD-10-CM

## 2019-09-18 PROCEDURE — 90834 PSYTX W PT 45 MINUTES: CPT | Performed by: SOCIAL WORKER
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
## 2019-09-22 ENCOUNTER — HOSPITAL ENCOUNTER (EMERGENCY)
Facility: HOSPITAL | Age: 24
Discharge: HOME | End: 2019-09-22
Attending: EMERGENCY MEDICINE | Admitting: EMERGENCY MEDICINE
Payer: COMMERCIAL

## 2019-09-22 ENCOUNTER — APPOINTMENT (EMERGENCY)
Dept: RADIOLOGY | Facility: HOSPITAL | Age: 24
End: 2019-09-22
Payer: COMMERCIAL

## 2019-09-22 VITALS
HEART RATE: 87 BPM | OXYGEN SATURATION: 98 % | RESPIRATION RATE: 16 BRPM | BODY MASS INDEX: 22.95 KG/M2 | DIASTOLIC BLOOD PRESSURE: 62 MMHG | WEIGHT: 123.46 LBS | SYSTOLIC BLOOD PRESSURE: 118 MMHG | TEMPERATURE: 98.5 F

## 2019-09-22 DIAGNOSIS — J06.9 URI (UPPER RESPIRATORY INFECTION): ICD-10-CM

## 2019-09-22 DIAGNOSIS — R73.9 HYPERGLYCEMIA: Primary | ICD-10-CM

## 2019-09-22 LAB
ALBUMIN SERPL BCP-MCNC: 3.8 G/DL (ref 3.5–5)
ALP SERPL-CCNC: 97 U/L (ref 46–116)
ALT SERPL W P-5'-P-CCNC: 15 U/L (ref 12–78)
ANION GAP SERPL CALCULATED.3IONS-SCNC: 10 MMOL/L (ref 4–13)
AST SERPL W P-5'-P-CCNC: 12 U/L (ref 5–45)
BACTERIA UR QL AUTO: ABNORMAL /HPF
BASE EX.OXY STD BLDV CALC-SCNC: 84 % (ref 60–80)
BASE EXCESS BLDV CALC-SCNC: -0.3 MMOL/L
BETA-HYDROXYBUTYRATE: 0.1 MMOL/L
BILIRUB SERPL-MCNC: 0.5 MG/DL (ref 0.2–1)
BILIRUB UR QL STRIP: NEGATIVE
BUN SERPL-MCNC: 11 MG/DL (ref 5–25)
CALCIUM SERPL-MCNC: 9.2 MG/DL (ref 8.3–10.1)
CHLORIDE SERPL-SCNC: 99 MMOL/L (ref 100–108)
CLARITY UR: CLEAR
CO2 SERPL-SCNC: 27 MMOL/L (ref 21–32)
COLOR UR: YELLOW
CREAT SERPL-MCNC: 0.9 MG/DL (ref 0.6–1.3)
ERYTHROCYTE [DISTWIDTH] IN BLOOD BY AUTOMATED COUNT: 11.9 % (ref 11.6–15.1)
EXT PREG TEST URINE: NEGATIVE
EXT. CONTROL ED NAV: NORMAL
GFR SERPL CREATININE-BSD FRML MDRD: 91 ML/MIN/1.73SQ M
GLUCOSE SERPL-MCNC: 371 MG/DL (ref 65–140)
GLUCOSE SERPL-MCNC: 374 MG/DL (ref 65–140)
GLUCOSE UR STRIP-MCNC: ABNORMAL MG/DL
HCO3 BLDV-SCNC: 24.2 MMOL/L (ref 24–30)
HCT VFR BLD AUTO: 40 % (ref 34.8–46.1)
HGB BLD-MCNC: 13.6 G/DL (ref 11.5–15.4)
HGB UR QL STRIP.AUTO: ABNORMAL
KETONES UR STRIP-MCNC: NEGATIVE MG/DL
LEUKOCYTE ESTERASE UR QL STRIP: NEGATIVE
LIPASE SERPL-CCNC: 190 U/L (ref 73–393)
MAGNESIUM SERPL-MCNC: 1.8 MG/DL (ref 1.6–2.6)
MCH RBC QN AUTO: 29.4 PG (ref 26.8–34.3)
MCHC RBC AUTO-ENTMCNC: 34 G/DL (ref 31.4–37.4)
MCV RBC AUTO: 86 FL (ref 82–98)
NITRITE UR QL STRIP: NEGATIVE
NON-SQ EPI CELLS URNS QL MICRO: ABNORMAL /HPF
O2 CT BLDV-SCNC: 16.6 ML/DL
PCO2 BLDV: 39.2 MM HG (ref 42–50)
PH BLDV: 7.41 [PH] (ref 7.3–7.4)
PH UR STRIP.AUTO: 6 [PH] (ref 4.5–8)
PLATELET # BLD AUTO: 208 THOUSANDS/UL (ref 149–390)
PMV BLD AUTO: 11.7 FL (ref 8.9–12.7)
PO2 BLDV: 49.3 MM HG (ref 35–45)
POTASSIUM SERPL-SCNC: 3.7 MMOL/L (ref 3.5–5.3)
PROT SERPL-MCNC: 8 G/DL (ref 6.4–8.2)
PROT UR STRIP-MCNC: NEGATIVE MG/DL
RBC # BLD AUTO: 4.63 MILLION/UL (ref 3.81–5.12)
RBC #/AREA URNS AUTO: ABNORMAL /HPF
SODIUM SERPL-SCNC: 136 MMOL/L (ref 136–145)
SP GR UR STRIP.AUTO: 1.02 (ref 1–1.03)
UROBILINOGEN UR QL STRIP.AUTO: 1 E.U./DL
WBC # BLD AUTO: 8.08 THOUSAND/UL (ref 4.31–10.16)
WBC #/AREA URNS AUTO: ABNORMAL /HPF

## 2019-09-22 PROCEDURE — 71046 X-RAY EXAM CHEST 2 VIEWS: CPT

## 2019-09-22 PROCEDURE — 82010 KETONE BODYS QUAN: CPT | Performed by: PHYSICIAN ASSISTANT

## 2019-09-22 PROCEDURE — 85027 COMPLETE CBC AUTOMATED: CPT | Performed by: PHYSICIAN ASSISTANT

## 2019-09-22 PROCEDURE — 36415 COLL VENOUS BLD VENIPUNCTURE: CPT | Performed by: PHYSICIAN ASSISTANT

## 2019-09-22 PROCEDURE — 82805 BLOOD GASES W/O2 SATURATION: CPT | Performed by: PHYSICIAN ASSISTANT

## 2019-09-22 PROCEDURE — 99284 EMERGENCY DEPT VISIT MOD MDM: CPT | Performed by: PHYSICIAN ASSISTANT

## 2019-09-22 PROCEDURE — 83735 ASSAY OF MAGNESIUM: CPT | Performed by: PHYSICIAN ASSISTANT

## 2019-09-22 PROCEDURE — 81001 URINALYSIS AUTO W/SCOPE: CPT

## 2019-09-22 PROCEDURE — 81025 URINE PREGNANCY TEST: CPT | Performed by: PHYSICIAN ASSISTANT

## 2019-09-22 PROCEDURE — 96360 HYDRATION IV INFUSION INIT: CPT

## 2019-09-22 PROCEDURE — 83690 ASSAY OF LIPASE: CPT | Performed by: PHYSICIAN ASSISTANT

## 2019-09-22 PROCEDURE — 80053 COMPREHEN METABOLIC PANEL: CPT | Performed by: PHYSICIAN ASSISTANT

## 2019-09-22 PROCEDURE — 82948 REAGENT STRIP/BLOOD GLUCOSE: CPT

## 2019-09-22 PROCEDURE — 99285 EMERGENCY DEPT VISIT HI MDM: CPT

## 2019-09-22 RX ORDER — AZITHROMYCIN 250 MG/1
TABLET, FILM COATED ORAL
Qty: 4 TABLET | Refills: 0 | Status: SHIPPED | OUTPATIENT
Start: 2019-09-22 | End: 2019-09-22 | Stop reason: SDUPTHER

## 2019-09-22 RX ORDER — 0.9 % SODIUM CHLORIDE 0.9 %
3 VIAL (ML) INJECTION AS NEEDED
Status: DISCONTINUED | OUTPATIENT
Start: 2019-09-22 | End: 2019-09-22 | Stop reason: HOSPADM

## 2019-09-22 RX ORDER — AZITHROMYCIN 250 MG/1
TABLET, FILM COATED ORAL
Qty: 4 TABLET | Refills: 0 | Status: SHIPPED | OUTPATIENT
Start: 2019-09-22 | End: 2019-09-26

## 2019-09-22 RX ORDER — AZITHROMYCIN 250 MG/1
500 TABLET, FILM COATED ORAL ONCE
Status: COMPLETED | OUTPATIENT
Start: 2019-09-22 | End: 2019-09-22

## 2019-09-22 RX ADMIN — AZITHROMYCIN 500 MG: 250 TABLET, FILM COATED ORAL at 05:24

## 2019-09-22 RX ADMIN — SODIUM CHLORIDE 1000 ML: 0.9 INJECTION, SOLUTION INTRAVENOUS at 04:42

## 2019-09-25 ENCOUNTER — SOCIAL WORK (OUTPATIENT)
Dept: BEHAVIORAL/MENTAL HEALTH CLINIC | Facility: CLINIC | Age: 24
End: 2019-09-25

## 2019-09-25 DIAGNOSIS — F42.2 MIXED OBSESSIONAL THOUGHTS AND ACTS: ICD-10-CM

## 2019-09-25 DIAGNOSIS — F33.1 MODERATE RECURRENT MAJOR DEPRESSION (HCC): ICD-10-CM

## 2019-09-25 DIAGNOSIS — F41.1 GAD (GENERALIZED ANXIETY DISORDER): ICD-10-CM

## 2019-09-25 DIAGNOSIS — F43.12 CHRONIC POST-TRAUMATIC STRESS DISORDER (PTSD): Primary | ICD-10-CM

## 2019-09-25 NOTE — ED PROVIDER NOTES
Pt Name: Jesse Calle  MRN: [id number]  [place]: 1995  Age/Sex: 27 y o  female  Date of evaluation: 9/22/2019  PCP: Clary Fischer, [address] [place] [address]    Chief Complaint   Patient presents with    Hyperglycemia - Symptomatic     pt who is type 1 diabetic, just diagnosed last january, reports that her sugar has been in 300s this week  reports poor control so far  reports incraesed urination, discomfort in cheat and 1 epiosdeo of vomiting  HPI    Gudelia Jerry presents to the Emergency Department complaining of Elevated Blood Sugar, Cough  Jesse Calle is a 27 y o  female who presents due to Elevated Blood Sugar, Cough  Pt with new recent Dx of T1DM in Jan 2019 presents with elevated BS, cough ongoing for 4 days with production of mild yellow sputum  Denies f/c/s, n/v/d, abdominal pain, lightheadedness, dizziness, weakness, numbness/tingling, headache, and no other complaints at this time        History provided by:  Patient   used: No    Hyperglycemia - Symptomatic   Severity:  Moderate  Onset quality:  Gradual  Timing:  Intermittent  Progression:  Waxing and waning  Chronicity:  Recurrent  Diabetes status:  Controlled with insulin  Context: not change in medication, not insulin pump use, not new diabetes diagnosis, not noncompliance, not recent change in diet and not recent illness    Relieved by:  Nothing  Ineffective treatments:  None tried  Associated symptoms: dehydration, fatigue, malaise and polyuria    Associated symptoms: no abdominal pain, no blurred vision, no chest pain, no confusion, no diaphoresis, no dizziness, no dysuria, no fever, no increased appetite, no increased thirst, no nausea, no shortness of breath, no syncope, no vomiting, no weakness and no weight change    Risk factors: hx of DKA    Risk factors: no family hx of diabetes, no obesity, no pregnancy and no recent steroid use          Past Medical and Surgical History    Past Medical History: Diagnosis Date    Anxiety     Anxiety and depression     Depression     Diabetes ([facility] )     type 1    Diabetes mellitus ([facility] ) 1/17/2019    Eating disorder     history of anorexia/bulemia [id number]    Fracture of fibula     R Salter I    Nasal congestion        Past Surgical History:   Procedure Laterality Date    NASAL SEPTOPLASTY W/ TURBINOPLASTY      WISDOM TOOTH EXTRACTION      WRIST SURGERY      left; Excision of ganglion       Family History   Problem Relation Age of Onset    Hypertension Mother    Marleny Silence Migraines Mother         Headache    Diabetes type II Mother     Varicose Veins Mother     Hyperlipidemia Mother    Marleny Silence Diabetes Mother    Marleny Silence Arthritis Mother     Depression Mother     Cholelithiasis Father     Hypertension Father     Sarcoidosis Father         Liver    Hyperlipidemia Father     Diabetes Father     Coronary artery disease Father     Nephrolithiasis Father     Cirrhosis Father     Alcohol abuse Father     Thyroid disease Sister    Marleny Silence Cancer Family     Diabetes Family     Hypertension Family        Social History     Tobacco Use    Smoking status: Never Smoker    Smokeless tobacco: Never Used    Tobacco comment: Tobacco smoke exposure (Father smokes cigars)   Substance Use Topics    Alcohol use: No     Frequency: Monthly or less     Comment: socially   Drug use: No       Allergies    Allergies   Allergen Reactions    Insulin Glargine     Iodides Throat Swelling     Reaction Date: 28Jul2014; Action Taken: none; Category: Allergy;     Iodine        Home Medications:    Prior to Admission medications    Medication Sig Start Date End Date Taking?  Authorizing Provider   Ricarda Sher 0 15-30 MG-MCG per tablet Take one tablet daily 4/3/19   Claude Smith PA-C   amoxicillin (AMOXIL) 500 MG tablet Take 500 mg by mouth 3 (three) times a day 9/6/19   Historical Provider, MD   azithromycin (ZITHROMAX) 250 mg tablet Take 1 tablet daily x 4 days 9/22/19 9/26/19  Macie Schaeffer SANDRA   Blood Glucose Monitoring Suppl (ONETOUCH VERIO) w/Device KIT Use as directed 1/26/19   Historical Provider, MD   dicyclomine (BENTYL) 10 mg capsule Take 1 capsule (10 mg total) by mouth 3 (three) times a day as needed (abdominal pain and loose stools) 6/17/19   Sabine Lr MD   DULoxetine (CYMBALTA) 30 mg delayed release capsule Take one tablet daily for the first 3-5 days and if tolerated, increase to 2 tablets once daily   7/23/19   MARGARET Faustin   glucagon (GLUCAGON EMERGENCY) 1 MG injection Inject 1 mg under the skin once as needed for low blood sugar for up to 2 doses 2/6/19   Alise Carlos MD   insulin aspart (NovoLOG) 100 Units/mL injection pen Inject 3 Units under the skin 3 (three) times a day with meals 7/31/19   Derick Barrientos PA-C   insulin aspart (NovoLOG) 100 units/mL injection Use 30 units per day via pump Disp 1 vial per month 6/5/19   Derick Barrientos PA-C   insulin degludec (TRESIBA FLEXTOUCH) 100 units/mL injection pen 8 units daily  Patient taking differently: Inject 8 Units under the skin daily 8 units daily 3/6/19   Derick Barrientos PA-C   Insulin Pen Needle (BD PEN NEEDLE NASIM U/F) 32G X 4 MM MISC by Does not apply route 4 (four) times a day for 180 days 6/5/19 12/2/19  Derick Barrientos PA-C   Insulin Pen Needle 29G X 5MM MISC by Does not apply route 4 (four) times a day (before meals and at bedtime) 1/26/19   Whitney Anthony PA-C   LORazepam (ATIVAN) 0 5 mg tablet Take 1 tablet (0 5 mg total) by mouth every 8 (eight) hours as needed for anxiety 8/6/19   MARGARET Faustin   Na Sulfate-K Sulfate-Mg Sulf 17 5-3 13-1 6 LG/520NO SOLN Take 1 applicator by mouth once for 1 dose 6/17/19 6/17/19  Sabine Lr MD   ondansetron (ZOFRAN) 4 mg tablet Take 1 tablet (4 mg total) by mouth every 8 (eight) hours as needed for nausea or vomiting 8/6/19   MARGARET FaustinTOUCH DELICA LANCETS 50G MISC Patient test 6 times daily 6/10/19   Early Bellow, SANDRA   ONETOUCH VERIO test strip Test six times a day 6/5/19   Derick Barrientos PA-C   pantoprazole (PROTONIX) 40 mg tablet Take 1 tablet (40 mg total) by mouth daily 6/17/19   Yoan Davis MD           Review of Systems    Review of Systems   Constitutional: Positive for fatigue  Negative for activity change, appetite change, chills, diaphoresis and fever  HENT: Positive for congestion (chest)  Negative for drooling, ear discharge, ear pain, facial swelling, postnasal drip, rhinorrhea, sinus pressure, sinus pain, sore throat, trouble swallowing and voice change  Eyes: Negative for blurred vision, discharge and visual disturbance  Respiratory: Positive for cough  Negative for apnea, choking, chest tightness, shortness of breath, wheezing and stridor  Cardiovascular: Negative for chest pain, palpitations, leg swelling and syncope  Gastrointestinal: Negative for abdominal pain, nausea and vomiting  Endocrine: Positive for polyuria  Negative for cold intolerance, heat intolerance, polydipsia and polyphagia  Genitourinary: Negative for dysuria  Musculoskeletal: Negative for arthralgias, joint swelling and neck pain  Skin: Negative for rash  Neurological: Negative for dizziness, weakness, light-headedness, numbness and headaches  Hematological: Negative for adenopathy  Does not bruise/bleed easily  Psychiatric/Behavioral: Negative for confusion  The patient is not nervous/anxious  All other systems reviewed and negative  Physical Exam      ED Triage Vitals [09/22/19 0353]   Temperature Pulse Respirations Blood Pressure SpO2   98 5 °F (36 9 °C) 91 18 152/95 97 %      Temp Source Heart Rate Source Patient Position - Orthostatic VS BP Location FiO2 (%)   Oral Monitor Sitting Right arm --      Pain Score       No Pain               Physical Exam   Constitutional: She is oriented to person, place, and time  She appears well-developed and well-nourished  No distress     HENT:   Head: Normocephalic and atraumatic  Right Ear: Hearing, tympanic membrane, external ear and ear canal normal  No lacerations  No drainage, swelling or tenderness  No foreign bodies  No mastoid tenderness  Tympanic membrane is not injected, not scarred, not perforated, not erythematous, not retracted and not bulging  No middle ear effusion  No hemotympanum  No decreased hearing is noted  Left Ear: Hearing, tympanic membrane, external ear and ear canal normal  No lacerations  No drainage, swelling or tenderness  No foreign bodies  No mastoid tenderness  Tympanic membrane is not injected, not scarred, not perforated, not erythematous, not retracted and not bulging  No middle ear effusion  No hemotympanum  No decreased hearing is noted  Nose: Nose normal  Right sinus exhibits no maxillary sinus tenderness and no frontal sinus tenderness  Left sinus exhibits no maxillary sinus tenderness and no frontal sinus tenderness  Mouth/Throat: Uvula is midline, oropharynx is clear and moist and mucous membranes are normal  No trismus in the jaw  No uvula swelling  No oropharyngeal exudate, posterior oropharyngeal edema, posterior oropharyngeal erythema or tonsillar abscesses  Tonsils are 1+ on the right  Tonsils are 1+ on the left  No tonsillar exudate  Eyes: Pupils are equal, round, and reactive to light  Conjunctivae and EOM are normal  Right eye exhibits no discharge  Left eye exhibits no discharge  Neck: Normal range of motion  Neck supple  No hepatojugular reflux and no JVD present  Carotid bruit is not present  Cardiovascular: Normal rate, regular rhythm, normal heart sounds, intact distal pulses and normal pulses  No extrasystoles are present  PMI is not displaced  Exam reveals no gallop and no friction rub  No murmur heard  Pulses:       Radial pulses are 2+ on the right side, and 2+ on the left side  Dorsalis pedis pulses are 2+ on the right side, and 2+ on the left side          Posterior tibial pulses are 2+ on the right side, and 2+ on the left side  Pulmonary/Chest: Effort normal and breath sounds normal  No stridor  No respiratory distress  She has no wheezes  She has no rales  She exhibits no tenderness  Mild intermittent cough   Abdominal: Soft  Bowel sounds are normal  She exhibits no distension and no mass  There is no hepatosplenomegaly  There is no tenderness  There is no rigidity, no rebound, no guarding, no CVA tenderness, no tenderness at McBurney's point and negative Barroso's sign  No hernia  Negative Barroso's  Negative Appendiceal signs (Psoas, Rovsing's, Obturator)  Negative Peritoneal Signs   Musculoskeletal: Normal range of motion  Lymphadenopathy:     She has no cervical adenopathy  Neurological: She is alert and oriented to person, place, and time  Skin: Skin is warm and dry  Capillary refill takes less than 2 seconds  She is not diaphoretic  Nursing note and vitals reviewed            Diagnostic Results    ECG      Labs:    Results for orders placed or performed during the hospital encounter of 09/22/19   CBC   Result Value Ref Range    WBC 8 08 4 31 - 10 16 Thousand/uL    RBC 4 63 3 81 - 5 12 Million/uL    Hemoglobin 13 6 11 5 - 15 4 g/dL    Hematocrit 40 0 34 8 - 46 1 %    MCV 86 82 - 98 fL    MCH 29 4 26 8 - 34 3 pg    MCHC 34 0 31 4 - 37 4 g/dL    RDW 11 9 11 6 - 15 1 %    Platelets 612 659 - 213 Thousands/uL    MPV 11 7 8 9 - 12 7 fL   Comprehensive metabolic panel   Result Value Ref Range    Sodium 136 136 - 145 mmol/L    Potassium 3 7 3 5 - 5 3 mmol/L    Chloride 99 (L) 100 - 108 mmol/L    CO2 27 21 - 32 mmol/L    ANION GAP 10 4 - 13 mmol/L    BUN 11 5 - 25 mg/dL    Creatinine 0 90 0 60 - 1 30 mg/dL    Glucose 371 (H) 65 - 140 mg/dL    Calcium 9 2 8 3 - 10 1 mg/dL    AST 12 5 - 45 U/L    ALT 15 12 - 78 U/L    Alkaline Phosphatase 97 46 - 116 U/L    Total Protein 8 0 6 4 - 8 2 g/dL    Albumin 3 8 3 5 - 5 0 g/dL    Total Bilirubin 0 50 0 20 - 1 00 mg/dL    eGFR 91 ml/min/1 73sq m   Beta Hydroxybutyrate   Result Value Ref Range    BETA-HYDROXYBUTYRATE 0 1 <0 6 mmol/L   Blood gas, venous   Result Value Ref Range    pH, Dariel 7 408 (H) 7 300 - 7 400    pCO2, Dariel 39 2 (L) 42 0 - 50 0 mm Hg    pO2, Dariel 49 3 (H) 35 0 - 45 0 mm Hg    HCO3, Dariel 24 2 24 - 30 mmol/L    Base Excess, Dariel -0 3 mmol/L    O2 Content, Dariel 16 6 ml/dL    O2 HGB, VENOUS 84 0 (H) 60 0 - 80 0 %   Lipase   Result Value Ref Range    Lipase 190 73 - 393 u/L   Magnesium   Result Value Ref Range    Magnesium 1 8 1 6 - 2 6 mg/dL   Urine Microscopic   Result Value Ref Range    RBC, UA None Seen None Seen, 0-5 /hpf    WBC, UA 0-1 (A) None Seen, 0-5, 5-55, 5-65 /hpf    Epithelial Cells Occasional None Seen, Occasional /hpf    Bacteria, UA Occasional None Seen, Occasional /hpf   POCT pregnancy, urine   Result Value Ref Range    EXT PREG TEST UR (Ref: Negative) negative     Control valid    Fingerstick Glucose (POCT)   Result Value Ref Range    POC Glucose 374 (H) 65 - 140 mg/dl   ED Urine Macroscopic   Result Value Ref Range    Color, UA Yellow     Clarity, UA Clear     pH, UA 6 0 4 5 - 8 0    Leukocytes, UA Negative Negative    Nitrite, UA Negative Negative    Protein, UA Negative Negative mg/dl    Glucose,  (1/2%) (A) Negative mg/dl    Ketones, UA Negative Negative mg/dl    Urobilinogen, UA 1 0 0 2, 1 0 E U /dl E U /dl    Bilirubin, UA Negative Negative    Blood, UA Trace (A) Negative    Specific Gravity, UA 1 020 1 003 - 1 030       All labs reviewed and utilized in the medical decision making process    Radiology:    XR chest 2 views   ED Interpretation   No acute cardiopulmonary disease      Final Result      No acute cardiopulmonary disease              Workstation performed: [id number]             All radiology studies independently viewed by me and interpreted by the radiologist     Procedure    Procedures      Assessment and Plan    MDM  Number of Diagnoses or Management Options  Hyperglycemia: new, needed workup  URI (upper respiratory infection): new, needed workup     Amount and/or Complexity of Data Reviewed  Clinical lab tests: ordered and reviewed  Tests in the radiology section of CPT®: ordered and reviewed  Tests in the medicine section of CPT®: ordered and reviewed  Review and summarize past medical records: yes  Independent visualization of images, tracings, or specimens: yes    Risk of Complications, Morbidity, and/or Mortality  Presenting problems: moderate  Diagnostic procedures: moderate  Management options: moderate    Patient Progress  Patient progress: stable      Initial ED assessment:  Kelly Fong is a 27 y o  female with significant PMH for T1DM controlled with insulin, newly diagnosed in January 2019, anxiety who presents with Hyperglycemia, Cough  Vitals signs reviewed   Physical examination unremarkable  Initial Ddx  includes but is not limited to:   DKA, hyperglycemia, metabolic abnormality, dehydration, sepsis, UTI, cardiac etiology, intracranial process, noncompliance with medications  and  URI, bronchitis, pneumonia, GERD, aspiration pneumonitis, viral illness, smoke inhalation, CO poisoning, adverse medication reaction  Initial ED plan:   Plan will be to perform diagnostic testing of CBC, CMP, Lipase, Beta Hydroxybutyrate, VBG, Mg, POC Glucose, POC upreg, urinalysis and treat symptomatically with IVF  Final ED summary/disposition: Discussed results of diagnostic testing with pt and friend with permission and in detail  Home care recommendations given with discharge paperwork  Return to ED instructions given if new/worsening sxs        MDM  Reviewed: previous chart, nursing note and vitals  Reviewed previous: labs  Interpretation: labs and x-ray        ED Course of Care and Re-Assessments    ED Course as of Sep 25 0449   Sun Sep 22, 2019   0419 POC Glucose(!): 374   0419 pH, Dariel(!): 7 408   0420 BETA-HYDROXYBUTYRATE: 0 1   0420 WBC: 8 08   0447 WBC, UA(!): 0-1   0451 Anion Gap: 10                          Medications   sodium chloride 0 9 % bolus 1,000 mL (0 mL Intravenous Stopped 9/22/19 0524)   azithromycin (ZITHROMAX) tablet 500 mg (500 mg Oral Given 9/22/19 0524)         FINAL IMPRESSION    Final diagnoses:   Hyperglycemia   URI (upper respiratory infection)         DISPOSITION/PLAN  Time reflects when diagnosis was documented in both MDM as applicable and the Disposition within this note     Time User Action Codes Description Comment    9/22/2019  5:22 AM Sarah Rolan Add [R73 9] Hyperglycemia     9/22/2019  5:22 AM Sarah Rolan Add [J06 9] URI (upper respiratory infection)       ED Disposition     ED Disposition Condition Date/Time Comment    Discharge Stable Sun Sep 22, 2019  5:22 AM Kevin Quintero discharge to home/self care              Follow-up Information     Follow up With Specialties Details Why Contact Info Additional [facility], Nurse Practitioner Go to  For follow up [facility] [phone]       [facility] Emergency Department Emergency Medicine Go to  If symptoms worsen Otoniel Nesbitt,[address]  [phone] [facility] ED, [address], [address] [facility], PAZOILA Endocrinology, Physician Assistant Go to  For follow up [address] [facility] [address]  [phone]                 PATIENT REFERRED TO:    Nasir Bernardo, [address] [place] [address]  [phone]    Go to   For follow up    [facility] [address]  [phone]  Go to   If symptoms worsen    Glenroy Hammond PA-C  [address] [place] [address] [address]  [phone]    Go to   For follow up      DISCHARGE MEDICATIONS:    Discharge Medication List as of 9/22/2019  5:24 AM      CONTINUE these medications which have NOT CHANGED    Details   ALTAVERA 0 15-30 MG-MCG per tablet Take one tablet daily, Normal      amoxicillin (AMOXIL) 500 MG tablet Take 500 mg by mouth 3 (three) times a day, Starting Fri 9/6/2019, Historical Med      Blood Glucose Monitoring Suppl (ONETOUCH VERIO) w/Device KIT Use as directed, Starting Sat 1/26/2019, Historical Med      dicyclomine (BENTYL) 10 mg capsule Take 1 capsule (10 mg total) by mouth 3 (three) times a day as needed (abdominal pain and loose stools), Starting Mon 6/17/2019, Normal      DULoxetine (CYMBALTA) 30 mg delayed release capsule Take one tablet daily for the first 3-5 days and if tolerated, increase to 2 tablets once daily  , Normal      glucagon (GLUCAGON EMERGENCY) 1 MG injection Inject 1 mg under the skin once as needed for low blood sugar for up to 2 doses, Starting Wed 2/6/2019, Normal      !! insulin aspart (NovoLOG) 100 Units/mL injection pen Inject 3 Units under the skin 3 (three) times a day with meals, Starting Wed 7/31/2019, Normal      !! insulin aspart (NovoLOG) 100 units/mL injection Use 30 units per day via pump Disp 1 vial per month, Normal      insulin degludec (TRESIBA FLEXTOUCH) 100 units/mL injection pen 8 units daily, Print      !! Insulin Pen Needle (BD PEN NEEDLE NASIM U/F) 32G X 4 MM MISC by Does not apply route 4 (four) times a day for 180 days, Starting Wed 6/5/2019, Until Mon 12/2/2019, Normal      !!  Insulin Pen Needle 29G X 5MM MISC by Does not apply route 4 (four) times a day (before meals and at bedtime), Starting Sat 1/26/2019, Print      LORazepam (ATIVAN) 0 5 mg tablet Take 1 tablet (0 5 mg total) by mouth every 8 (eight) hours as needed for anxiety, Starting Tue 8/6/2019, Print      Na Sulfate-K Sulfate-Mg Sulf 17 5-3 13-1 6 WN/002QO SOLN Take 1 applicator by mouth once for 1 dose, Starting Mon 6/17/2019, Normal      ondansetron (ZOFRAN) 4 mg tablet Take 1 tablet (4 mg total) by mouth every 8 (eight) hours as needed for nausea or vomiting, Starting Tue 8/6/2019, Print      ONETOUCH DELICA LANCETS 70N MISC Patient test 6 times daily, Normal      ONETOUCH VERIO test strip Test six times a day, Normal      pantoprazole (PROTONIX) 40 mg tablet Take 1 tablet (40 mg total) by mouth daily, Starting Mon 6/17/2019, Normal       !! - Potential duplicate medications found  Please discuss with provider  No discharge procedures on file           SANDRA Miranda PA-C  09/25/19 9266

## 2019-09-25 NOTE — PSYCH
Psychotherapy Provided: Individual Psychotherapy 50 minutes     Length of time in session: 50 minutes, follow up in 1 week    Goals addressed in session: Goal 1, Goal 2 and Goal 3      Pain:      none    0    Current suicide risk : Low     D: Met with Jenny individually  Session focused upon growing up in her home, specific cultural beliefs and Jenny's obsession of 'always having to play by the rules'  Sister often challenged family rules and presently not speaking to Gudelia Jerry due to her refusal to engage with dad  Further discussion of Jenny's irrational fears, behaviors consistent with OCD and 'crying spells or anger outburst' if items are moved or not in order for her  Letter was written for Gudelia Jerry to have an emotional support dog and one for disability stating current therapies and dx (ADINA signed)  Denied SI     A: Gudelia Jerry presented with anxious/depressed mood  She demonstrates many routines that clearly get her upset when they are inturrupted  Feeling 'out of control' growing up and recent trauma may increase her need for control  P: Continue weekly individual therapy    Behavioral Health Treatment Plan [facility]: Diagnosis and Treatment Plan explained to Belén Camacho relates understanding diagnosis and is agreeable to Treatment Plan   Yes

## 2019-09-26 PROBLEM — F42.9 OCD (OBSESSIVE COMPULSIVE DISORDER): Status: ACTIVE | Noted: 2019-09-26

## 2019-10-02 ENCOUNTER — SOCIAL WORK (OUTPATIENT)
Dept: BEHAVIORAL/MENTAL HEALTH CLINIC | Facility: CLINIC | Age: 24
End: 2019-10-02
Payer: COMMERCIAL

## 2019-10-02 DIAGNOSIS — F41.1 GAD (GENERALIZED ANXIETY DISORDER): ICD-10-CM

## 2019-10-02 DIAGNOSIS — F33.1 MODERATE RECURRENT MAJOR DEPRESSION (HCC): ICD-10-CM

## 2019-10-02 DIAGNOSIS — F42.2 MIXED OBSESSIONAL THOUGHTS AND ACTS: ICD-10-CM

## 2019-10-02 DIAGNOSIS — F41.8 DEPRESSION WITH ANXIETY: ICD-10-CM

## 2019-10-02 DIAGNOSIS — F43.12 CHRONIC POST-TRAUMATIC STRESS DISORDER (PTSD): Primary | ICD-10-CM

## 2019-10-02 PROCEDURE — 90834 PSYTX W PT 45 MINUTES: CPT | Performed by: SOCIAL WORKER

## 2019-10-02 NOTE — PSYCH
Psychotherapy Provided: Individual Psychotherapy 50 minutes     Length of time in session: 50 minutes, follow up in 1 week    Goals addressed in session: Goal 1, Goal 2 and Goal 3      Pain:      none    0    Current suicide risk : Low     D: Met with Jenny josué CORNEJO; 'It has been a very bad week'  Session focused upon sister giving out Jenny's address which is off limits to her father  Father was home and in [place] of phone call to bank (gave Noah Houston as a reference)  Averaging 5 hours a night due to hypervigilance  Acknowledged dad cannot break in but Noah Houston is triggered by memories of dad trying to get into her bedroom door when she lived at home  'I think I hear a door and nothing has opened'  Provided psycho education on PTSD and specific steps of mindfulness exercises  Medical wise Endocrinologist feels 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  Pancreas has minimal function still  Still must go to Rheumatology and Orthopedics  Denied SI        A: Noah Houston presented with depressed/anxious mood and constricted affect  Above symptoms are indicative of PTSD triggered by sister giving out address and father overhearing  Receptive to education and exercises  P: Continue individual therapy, monitor symptoms, process memories  Behavioral Health Treatment Plan [facility]: Diagnosis and Treatment Plan explained to Katheryn Glover relates understanding diagnosis and is agreeable to Treatment Plan   Yes

## 2019-10-03 PROBLEM — F41.8 DEPRESSION WITH ANXIETY: Status: RESOLVED | Noted: 2019-01-17 | Resolved: 2019-10-03

## 2019-10-08 ENCOUNTER — HOSPITAL ENCOUNTER (OUTPATIENT)
Dept: NON INVASIVE DIAGNOSTICS | Facility: CLINIC | Age: 24
Discharge: HOME | End: 2019-10-08
Payer: COMMERCIAL

## 2019-10-08 DIAGNOSIS — R68.89 ACTIVITY INTOLERANCE: ICD-10-CM

## 2019-10-08 DIAGNOSIS — R07.9 CHEST PAIN, UNSPECIFIED TYPE: ICD-10-CM

## 2019-10-08 PROCEDURE — 93351 STRESS TTE COMPLETE: CPT | Performed by: INTERNAL MEDICINE

## 2019-10-08 PROCEDURE — 93350 STRESS TTE ONLY: CPT

## 2019-10-10 ENCOUNTER — HOSPITAL ENCOUNTER (OUTPATIENT)
Dept: GASTROENTEROLOGY | Facility: HOSPITAL | Age: 24
Setting detail: OUTPATIENT SURGERY
Discharge: HOME | End: 2019-10-10
Attending: INTERNAL MEDICINE | Admitting: INTERNAL MEDICINE
Payer: COMMERCIAL

## 2019-10-10 ENCOUNTER — ANESTHESIA (OUTPATIENT)
Dept: GASTROENTEROLOGY | Facility: HOSPITAL | Age: 24
End: 2019-10-10

## 2019-10-10 ENCOUNTER — ANESTHESIA EVENT (OUTPATIENT)
Dept: GASTROENTEROLOGY | Facility: HOSPITAL | Age: 24
End: 2019-10-10

## 2019-10-10 VITALS
HEART RATE: 87 BPM | HEIGHT: 62 IN | DIASTOLIC BLOOD PRESSURE: 59 MMHG | RESPIRATION RATE: 16 BRPM | TEMPERATURE: 98 F | OXYGEN SATURATION: 99 % | WEIGHT: 123 LBS | SYSTOLIC BLOOD PRESSURE: 96 MMHG | BODY MASS INDEX: 22.63 KG/M2

## 2019-10-10 DIAGNOSIS — K62.5 HEMORRHAGE OF ANUS AND RECTUM: ICD-10-CM

## 2019-10-10 LAB
CHEST PAIN STATEMENT: NORMAL
EXT PREGNANCY TEST URINE: NEGATIVE
EXT. CONTROL: NORMAL
MAX DIASTOLIC BP: 72 MMHG
MAX HEART RATE: 173 BPM
MAX PREDICTED HEART RATE: 198 BPM
MAX. SYSTOLIC BP: 150 MMHG
PROTOCOL NAME: NORMAL
REASON FOR TERMINATION: NORMAL
TARGET HR FORMULA: NORMAL
TEST INDICATION: NORMAL
TIME IN EXERCISE PHASE: NORMAL

## 2019-10-10 PROCEDURE — 81025 URINE PREGNANCY TEST: CPT | Performed by: ANESTHESIOLOGY

## 2019-10-10 PROCEDURE — 82948 REAGENT STRIP/BLOOD GLUCOSE: CPT

## 2019-10-10 RX ORDER — SODIUM CHLORIDE 9 MG/ML
125 INJECTION, SOLUTION INTRAVENOUS CONTINUOUS
Status: CANCELLED | OUTPATIENT
Start: 2019-10-10

## 2019-10-10 RX ORDER — MIDAZOLAM HYDROCHLORIDE 1 MG/ML
INJECTION INTRAMUSCULAR; INTRAVENOUS AS NEEDED
Status: DISCONTINUED | OUTPATIENT
Start: 2019-10-10 | End: 2019-10-10 | Stop reason: SURG

## 2019-10-10 RX ORDER — PROPOFOL 10 MG/ML
INJECTION, EMULSION INTRAVENOUS AS NEEDED
Status: DISCONTINUED | OUTPATIENT
Start: 2019-10-10 | End: 2019-10-10 | Stop reason: SURG

## 2019-10-10 RX ORDER — ONDANSETRON 2 MG/ML
4 INJECTION INTRAMUSCULAR; INTRAVENOUS ONCE AS NEEDED
Status: COMPLETED | OUTPATIENT
Start: 2019-10-10 | End: 2019-10-10

## 2019-10-10 RX ORDER — PROMETHAZINE HYDROCHLORIDE 25 MG/ML
6.25 INJECTION, SOLUTION INTRAMUSCULAR; INTRAVENOUS ONCE AS NEEDED
Status: DISCONTINUED | OUTPATIENT
Start: 2019-10-10 | End: 2019-10-14 | Stop reason: HOSPADM

## 2019-10-10 RX ORDER — SODIUM CHLORIDE 9 MG/ML
INJECTION, SOLUTION INTRAVENOUS CONTINUOUS PRN
Status: DISCONTINUED | OUTPATIENT
Start: 2019-10-10 | End: 2019-10-10 | Stop reason: SURG

## 2019-10-10 RX ORDER — LIDOCAINE HYDROCHLORIDE 10 MG/ML
INJECTION, SOLUTION INFILTRATION; PERINEURAL AS NEEDED
Status: DISCONTINUED | OUTPATIENT
Start: 2019-10-10 | End: 2019-10-10 | Stop reason: SURG

## 2019-10-10 RX ADMIN — PROPOFOL 120 MG: 10 INJECTION, EMULSION INTRAVENOUS at 09:42

## 2019-10-10 RX ADMIN — MIDAZOLAM 2 MG: 1 INJECTION INTRAMUSCULAR; INTRAVENOUS at 10:37

## 2019-10-10 RX ADMIN — ONDANSETRON 4 MG: 2 INJECTION INTRAMUSCULAR; INTRAVENOUS at 11:36

## 2019-10-10 RX ADMIN — PROPOFOL 50 MG: 10 INJECTION, EMULSION INTRAVENOUS at 09:48

## 2019-10-10 RX ADMIN — PROPOFOL 50 MG: 10 INJECTION, EMULSION INTRAVENOUS at 09:46

## 2019-10-10 RX ADMIN — SODIUM CHLORIDE: 0.9 INJECTION, SOLUTION INTRAVENOUS at 09:37

## 2019-10-10 RX ADMIN — PROPOFOL 30 MG: 10 INJECTION, EMULSION INTRAVENOUS at 09:44

## 2019-10-10 RX ADMIN — PROPOFOL 50 MG: 10 INJECTION, EMULSION INTRAVENOUS at 09:58

## 2019-10-10 RX ADMIN — PROPOFOL 50 MG: 10 INJECTION, EMULSION INTRAVENOUS at 09:54

## 2019-10-10 RX ADMIN — LIDOCAINE HYDROCHLORIDE 50 MG: 10 INJECTION, SOLUTION INFILTRATION; PERINEURAL at 09:42

## 2019-10-10 RX ADMIN — PROPOFOL 50 MG: 10 INJECTION, EMULSION INTRAVENOUS at 09:56

## 2019-10-10 RX ADMIN — PROPOFOL 50 MG: 10 INJECTION, EMULSION INTRAVENOUS at 09:52

## 2019-10-10 RX ADMIN — PROPOFOL 100 MG: 10 INJECTION, EMULSION INTRAVENOUS at 09:50

## 2019-10-10 NOTE — ANESTHESIA PREPROCEDURE EVALUATION
Review of Systems/Medical History  Patient summary reviewed  Chart reviewed  History of anesthetic complications (agressive when emerging from anesthesia)     Cardiovascular  Negative cardio ROS Exercise tolerance (METS): >4,  No angina , CRISTOBAL,    Pulmonary  Negative pulmonary ROS Not a smoker , No shortness of breath, No recent URI ,        GI/Hepatic    GI bleeding , history of, Bowel prep            Endo/Other  Diabetes (FSBG 177) type 1 Insulin,      GYN       Hematology   Musculoskeletal       Neurology   Psychology   Psychiatric history, Anxiety, Depression ,              Physical Exam    Airway    Mallampati score: II  TM Distance: >3 FB  Neck ROM: full     Dental   No notable dental hx     Cardiovascular  Comment: Negative ROS, Cardiovascular exam normal    Pulmonary  Pulmonary exam normal     Other Findings        Anesthesia Plan  ASA Score- 2     Anesthesia Type- IV sedation with anesthesia with ASA Monitors  Additional Monitors:   Airway Plan:         Plan Factors-    Induction- intravenous  Postoperative Plan-     Informed Consent- Anesthetic plan and risks discussed with patient  I personally reviewed this patient with the CRNA  Discussed and agreed on the Anesthesia Plan with the CRNA  Robert Holley

## 2019-10-10 NOTE — H&P
History and Physical - [facility]  Jim Pan 27 y o  female MRN: [id number]                  HPI: Jim Pan is a 27y o  year old female who presents for colonoscopy to evaluate rectal bleeding and lower abdominal pain  REVIEW OF SYSTEMS: Per the HPI, and otherwise unremarkable  Historical Information   Past Medical History:   Diagnosis Date    Abdominal pain     Anxiety     Anxiety and depression     Depression     Diabetes ([facility] )     type 1    Diabetes mellitus ([facility] ) 1/17/2019    Eating disorder     history of anorexia/bulemia [id number]    Fracture of fibula     R Salter I    Nasal congestion     PTSD (post-traumatic stress disorder)     Rectal bleed      Past Surgical History:   Procedure Laterality Date    NASAL SEPTOPLASTY W/ TURBINOPLASTY      WISDOM TOOTH EXTRACTION      WRIST SURGERY      left; Excision of ganglion     Social History   Social History     Substance and Sexual Activity   Alcohol Use No    Frequency: Monthly or less    Comment: socially       Social History     Substance and Sexual Activity   Drug Use No     Social History     Tobacco Use   Smoking Status Never Smoker   Smokeless Tobacco Never Used   Tobacco Comment    Tobacco smoke exposure (Father smokes cigars)     Family History   Problem Relation Age of Onset    Hypertension Mother     Migraines Mother         Headache    Diabetes type II Mother     Varicose Veins Mother     Hyperlipidemia Mother     Diabetes Mother     Arthritis Mother     Depression Mother     Cholelithiasis Father     Hypertension Father     Sarcoidosis Father         Liver    Hyperlipidemia Father     Diabetes Father     Coronary artery disease Father     Nephrolithiasis Father     Cirrhosis Father     Alcohol abuse Father     Thyroid disease Sister     Cancer Family     Diabetes Family     Hypertension Family        Meds/Allergies       (Not in a hospital admission)    Allergies   Allergen Reactions    Insulin Glargine     Iodides Throat Swelling     Reaction Date: 28Jul2014; Action Taken: none; Category: Allergy;     Iodine        Objective     Ht 5' 2" (1 575 m)   Wt 55 8 kg (123 lb)   LMP 10/05/2019   BMI 22 50 kg/m²       PHYSICAL EXAM    Gen: NAD  CV: RRR  CHEST: Clear  ABD: soft, NT/ND  EXT: no edema      ASSESSMENT/PLAN:  This is a 27y o  year old female here for colonoscopy and she is stable and optimized for her procedure
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
## 2019-10-11 LAB — GLUCOSE SERPL-MCNC: 136 MG/DL (ref 65–140)

## 2019-10-15 ENCOUNTER — OFFICE VISIT (OUTPATIENT)
Dept: INTERNAL MEDICINE CLINIC | Facility: CLINIC | Age: 24
End: 2019-10-15
Payer: COMMERCIAL

## 2019-10-15 VITALS
OXYGEN SATURATION: 99 % | WEIGHT: 124 LBS | RESPIRATION RATE: 16 BRPM | BODY MASS INDEX: 22.82 KG/M2 | TEMPERATURE: 97.6 F | SYSTOLIC BLOOD PRESSURE: 116 MMHG | DIASTOLIC BLOOD PRESSURE: 72 MMHG | HEIGHT: 62 IN | HEART RATE: 77 BPM

## 2019-10-15 DIAGNOSIS — E10.65 TYPE 1 DIABETES MELLITUS WITH HYPERGLYCEMIA (HCC): ICD-10-CM

## 2019-10-15 DIAGNOSIS — R07.9 CHEST PAIN, UNSPECIFIED TYPE: ICD-10-CM

## 2019-10-15 DIAGNOSIS — K92.1 BLOOD IN THE STOOL: ICD-10-CM

## 2019-10-15 DIAGNOSIS — R10.9 CHRONIC ABDOMINAL PAIN: ICD-10-CM

## 2019-10-15 DIAGNOSIS — M25.561 CHRONIC PAIN OF RIGHT KNEE: Primary | ICD-10-CM

## 2019-10-15 DIAGNOSIS — G89.29 CHRONIC ABDOMINAL PAIN: ICD-10-CM

## 2019-10-15 DIAGNOSIS — G89.29 CHRONIC PAIN OF RIGHT KNEE: Primary | ICD-10-CM

## 2019-10-15 PROCEDURE — 99214 OFFICE O/P EST MOD 30 MIN: CPT | Performed by: NURSE PRACTITIONER

## 2019-10-15 NOTE — ASSESSMENT & PLAN NOTE
Pt continues with abdominal pain and blood in her stool though no source was identified by her EGD or colonoscopy  She continues with pain persisting >9 months  She has not had a CT of the abdoment/pelvis yet and this was ordered today for further evaluation

## 2019-10-15 NOTE — PROGRESS NOTES
Assessment/Plan:    Type 1 diabetes mellitus with hyperglycemia ([facility] )  Pt currently working with Dr Bridget Diaz for management  Current plan is for her to continue with her insulin dosing and he wants to repeat blood work at her f/u in January  He wants to evaluate her for DIVYA diabetes  Her blood sugars remain elevated and he is aware  He does not currently want to increase her insulin dose  Lab Results   Component Value Date    HGBA1C 7 0 (H) 09/05/2019       Blood in the stool  Pt has undergone upper GI and colonoscopy  Upper GI showed gastritis which was treated with protonix x 3 months, no ulcers, negative h  Pylori  She has tested negative for celiac  Colonoscopy was normal   She continues with blood in her stool and lower abd/pelvic pain, GI told her the cause is not clear  She is to continue to monitor and he wants to f/u with her in a few months  Chest pain  Symptoms continue  She had a normal stress test with no evidence of ischemia  Symptoms may be related to gastritis/reflux  She stopped protonix in September per the instructions from GI  I told her that she should schedule f/u visit with them to discuss her chest pain as this is not cardia it may be due to GERD  She does not believe she mentioned chest pain to the GI doctor when she was seen  Chronic abdominal pain  Pt continues with abdominal pain and blood in her stool though no source was identified by her EGD or colonoscopy  She continues with pain persisting >9 months  She has not had a CT of the abdoment/pelvis yet and this was ordered today for further evaluation  Chronic pain of right knee  Chronic R knee pain  Pt states that the rheumatologist advised that she have this evaluated by ortho so a referral was provided for her today  Diagnoses and all orders for this visit:    Chronic pain of right knee  -     Ambulatory referral to Orthopedic Surgery;  Future    Chronic abdominal pain  -     CT abdomen pelvis w wo contrast; Future    Type 1 diabetes mellitus with hyperglycemia (HCC)    Chest pain, unspecified type    Blood in the stool          Subjective:      Patient ID: Santo Stout is a 27 y o  female  Pt is a 27 y o  y/o female who is seen today for follow up  She was in the office on 9/10 and at that time she complained about activity intolerance and chest pain  She was sent for a stress test and we reviewed the results of that today  Her stress test was normal   She continues with intermittent chest pain which she describes as sharp and not associated with physical activity  She also continues with episodes of activity intolerance with shortness of breath  She also continues with lower abdominal pain with blood in her stool  She had a colonoscopy and upper GI endoscopy through her gastroenterologist   Her colonoscopy was normal and her GI told her he does not know where the blood is coming from and she should continue to monitor for a few months and then they will follow up  Her upper gi endoscopy showed gastritis and she was treated with pantoprazole for 3 months which she completed in September  No change in any of her symptoms since stopping that medication  Pt continues to have poor control of her blood sugars and is working with Dr Natalie Lo for further work up and evaluation  Her diagnosis states type 1 diabetes but based on labs he ran, she says he told her that he does not believe she has type 1 diabetes  She is very frustrated and eager to feel better  She does not understand why she has so many medical complaints now when she had been healthy prior to last year  The following portions of the patient's history were reviewed and updated as appropriate: allergies, current medications, past family history, past medical history, past social history, past surgical history and problem list     Review of Systems   Constitutional: Positive for activity change, fatigue and fever   Negative for chills  HENT: Negative for congestion  Respiratory: Positive for shortness of breath  Negative for cough  Cardiovascular: Positive for chest pain  Negative for palpitations and leg swelling  Gastrointestinal: Positive for abdominal pain and blood in stool  Negative for abdominal distention  Genitourinary: Positive for pelvic pain  Negative for difficulty urinating, dysuria, frequency and menstrual problem  Musculoskeletal: Positive for arthralgias (r knee pain)  Skin: Negative for color change and rash  Psychiatric/Behavioral: Positive for dysphoric mood  Objective:      /72 (BP Location: Left arm, Cuff Size: Standard)   Pulse 77   Temp 97 6 °F (36 4 °C)   Resp 16   Ht 5' 2" (1 575 m)   Wt 56 2 kg (124 lb)   LMP 10/05/2019   SpO2 99%   BMI 22 68 kg/m²          Physical Exam   Constitutional: She is oriented to person, place, and time  Vital signs are normal  She appears well-developed and well-nourished  She is cooperative  HENT:   Right Ear: Hearing, tympanic membrane, external ear and ear canal normal    Left Ear: Hearing, tympanic membrane, external ear and ear canal normal    Mouth/Throat: Oropharynx is clear and moist    Eyes: Conjunctivae and lids are normal    Neck: No JVD present  Carotid bruit is not present  Cardiovascular: Normal rate, regular rhythm, normal heart sounds and intact distal pulses  No murmur heard  Pulmonary/Chest: Effort normal and breath sounds normal    Abdominal: Soft  Normal appearance and bowel sounds are normal  There is tenderness in the right upper quadrant and left lower quadrant  No hernia  Musculoskeletal: Normal range of motion  Neurological: She is alert and oriented to person, place, and time  She has normal strength  Skin: Skin is warm, dry and intact  Psychiatric: She has a normal mood and affect   Her speech is normal and behavior is normal  Judgment and thought content normal  Cognition and memory are normal    Vitals reviewed

## 2019-10-15 NOTE — ASSESSMENT & PLAN NOTE
Chronic R knee pain  Pt states that the rheumatologist advised that she have this evaluated by ortho so a referral was provided for her today

## 2019-10-15 NOTE — ASSESSMENT & PLAN NOTE
Pt has undergone upper GI and colonoscopy  Upper GI showed gastritis which was treated with protonix x 3 months, no ulcers, negative h  Pylori  She has tested negative for celiac  Colonoscopy was normal   She continues with blood in her stool and lower abd/pelvic pain, GI told her the cause is not clear  She is to continue to monitor and he wants to f/u with her in a few months

## 2019-10-15 NOTE — ASSESSMENT & PLAN NOTE
Symptoms continue  She had a normal stress test with no evidence of ischemia  Symptoms may be related to gastritis/reflux  She stopped protonix in September per the instructions from GI  I told her that she should schedule f/u visit with them to discuss her chest pain as this is not cardia it may be due to GERD  She does not believe she mentioned chest pain to the GI doctor when she was seen

## 2019-10-15 NOTE — ASSESSMENT & PLAN NOTE
Pt currently working with Dr General Bourne for management  Current plan is for her to continue with her insulin dosing and he wants to repeat blood work at her f/u in January  He wants to evaluate her for DIVYA diabetes  Her blood sugars remain elevated and he is aware  He does not currently want to increase her insulin dose    Lab Results   Component Value Date    HGBA1C 7 0 (H) 09/05/2019

## 2019-10-16 ENCOUNTER — SOCIAL WORK (OUTPATIENT)
Dept: BEHAVIORAL/MENTAL HEALTH CLINIC | Facility: CLINIC | Age: 24
End: 2019-10-16
Payer: COMMERCIAL

## 2019-10-16 DIAGNOSIS — F41.1 GAD (GENERALIZED ANXIETY DISORDER): ICD-10-CM

## 2019-10-16 DIAGNOSIS — F43.12 CHRONIC POST-TRAUMATIC STRESS DISORDER (PTSD): Primary | ICD-10-CM

## 2019-10-16 DIAGNOSIS — F33.1 MODERATE RECURRENT MAJOR DEPRESSION (HCC): ICD-10-CM

## 2019-10-16 DIAGNOSIS — F42.2 MIXED OBSESSIONAL THOUGHTS AND ACTS: ICD-10-CM

## 2019-10-16 PROCEDURE — 90834 PSYTX W PT 45 MINUTES: CPT | Performed by: SOCIAL WORKER
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
## 2019-10-21 PROCEDURE — 99285 EMERGENCY DEPT VISIT HI MDM: CPT

## 2019-10-22 ENCOUNTER — APPOINTMENT (EMERGENCY)
Dept: CT IMAGING | Facility: HOSPITAL | Age: 24
End: 2019-10-22
Payer: COMMERCIAL

## 2019-10-22 ENCOUNTER — HOSPITAL ENCOUNTER (EMERGENCY)
Facility: HOSPITAL | Age: 24
Discharge: HOME | End: 2019-10-22
Attending: EMERGENCY MEDICINE | Admitting: EMERGENCY MEDICINE
Payer: COMMERCIAL

## 2019-10-22 VITALS
RESPIRATION RATE: 18 BRPM | OXYGEN SATURATION: 97 % | DIASTOLIC BLOOD PRESSURE: 79 MMHG | SYSTOLIC BLOOD PRESSURE: 116 MMHG | WEIGHT: 122.8 LBS | BODY MASS INDEX: 22.46 KG/M2 | TEMPERATURE: 98.3 F | HEART RATE: 79 BPM

## 2019-10-22 DIAGNOSIS — R11.0 NAUSEA: ICD-10-CM

## 2019-10-22 DIAGNOSIS — R10.9 ABDOMINAL PAIN: ICD-10-CM

## 2019-10-22 DIAGNOSIS — R73.9 HYPERGLYCEMIA: Primary | ICD-10-CM

## 2019-10-22 LAB
ALBUMIN SERPL BCP-MCNC: 4.3 G/DL (ref 3.5–5)
ALP SERPL-CCNC: 91 U/L (ref 46–116)
ALT SERPL W P-5'-P-CCNC: 20 U/L (ref 12–78)
ANION GAP SERPL CALCULATED.3IONS-SCNC: 14 MMOL/L (ref 4–13)
APTT PPP: 25 SECONDS (ref 23–37)
AST SERPL W P-5'-P-CCNC: 14 U/L (ref 5–45)
ATRIAL RATE: 76 BPM
BASE EX.OXY STD BLDV CALC-SCNC: 79.8 % (ref 60–80)
BASE EXCESS BLDV CALC-SCNC: -2.3 MMOL/L
BASOPHILS # BLD AUTO: 0.03 THOUSANDS/ΜL (ref 0–0.1)
BASOPHILS NFR BLD AUTO: 0 % (ref 0–1)
BETA-HYDROXYBUTYRATE: 0.4 MMOL/L
BILIRUB SERPL-MCNC: 1.1 MG/DL (ref 0.2–1)
BILIRUB UR QL STRIP: NEGATIVE
BUN SERPL-MCNC: 10 MG/DL (ref 5–25)
CALCIUM SERPL-MCNC: 9.5 MG/DL (ref 8.3–10.1)
CHLORIDE SERPL-SCNC: 97 MMOL/L (ref 100–108)
CLARITY UR: CLEAR
CO2 SERPL-SCNC: 22 MMOL/L (ref 21–32)
COLOR UR: YELLOW
CREAT SERPL-MCNC: 0.81 MG/DL (ref 0.6–1.3)
EOSINOPHIL # BLD AUTO: 0.09 THOUSAND/ΜL (ref 0–0.61)
EOSINOPHIL NFR BLD AUTO: 1 % (ref 0–6)
ERYTHROCYTE [DISTWIDTH] IN BLOOD BY AUTOMATED COUNT: 11.8 % (ref 11.6–15.1)
EXT PREG TEST URINE: NEGATIVE
EXT. CONTROL ED NAV: NORMAL
GFR SERPL CREATININE-BSD FRML MDRD: 103 ML/MIN/1.73SQ M
GLUCOSE SERPL-MCNC: 194 MG/DL (ref 65–140)
GLUCOSE SERPL-MCNC: 353 MG/DL (ref 65–140)
GLUCOSE SERPL-MCNC: 419 MG/DL (ref 65–140)
GLUCOSE UR STRIP-MCNC: ABNORMAL MG/DL
HCG SERPL QL: NEGATIVE
HCO3 BLDV-SCNC: 22.1 MMOL/L (ref 24–30)
HCT VFR BLD AUTO: 43.3 % (ref 34.8–46.1)
HGB BLD-MCNC: 14.9 G/DL (ref 11.5–15.4)
HGB UR QL STRIP.AUTO: NEGATIVE
IMM GRANULOCYTES # BLD AUTO: 0.02 THOUSAND/UL (ref 0–0.2)
IMM GRANULOCYTES NFR BLD AUTO: 0 % (ref 0–2)
INR PPP: 1 (ref 0.84–1.19)
KETONES UR STRIP-MCNC: ABNORMAL MG/DL
LACTATE SERPL-SCNC: 1.8 MMOL/L (ref 0.5–2)
LACTATE SERPL-SCNC: 2.1 MMOL/L (ref 0.5–2)
LEUKOCYTE ESTERASE UR QL STRIP: NEGATIVE
LIPASE SERPL-CCNC: 156 U/L (ref 73–393)
LYMPHOCYTES # BLD AUTO: 3.2 THOUSANDS/ΜL (ref 0.6–4.47)
LYMPHOCYTES NFR BLD AUTO: 44 % (ref 14–44)
MCH RBC QN AUTO: 28.9 PG (ref 26.8–34.3)
MCHC RBC AUTO-ENTMCNC: 34.4 G/DL (ref 31.4–37.4)
MCV RBC AUTO: 84 FL (ref 82–98)
MONOCYTES # BLD AUTO: 0.56 THOUSAND/ΜL (ref 0.17–1.22)
MONOCYTES NFR BLD AUTO: 8 % (ref 4–12)
NEUTROPHILS # BLD AUTO: 3.39 THOUSANDS/ΜL (ref 1.85–7.62)
NEUTS SEG NFR BLD AUTO: 47 % (ref 43–75)
NITRITE UR QL STRIP: NEGATIVE
NRBC BLD AUTO-RTO: 0 /100 WBCS
O2 CT BLDV-SCNC: 17.2 ML/DL
P AXIS: 66 DEGREES
PCO2 BLDV: 37 MM HG (ref 42–50)
PH BLDV: 7.39 [PH] (ref 7.3–7.4)
PH UR STRIP.AUTO: 5.5 [PH] (ref 4.5–8)
PLATELET # BLD AUTO: 190 THOUSANDS/UL (ref 149–390)
PMV BLD AUTO: 11.8 FL (ref 8.9–12.7)
PO2 BLDV: 43.3 MM HG (ref 35–45)
POTASSIUM SERPL-SCNC: 3.8 MMOL/L (ref 3.5–5.3)
PR INTERVAL: 142 MS
PROT SERPL-MCNC: 8.5 G/DL (ref 6.4–8.2)
PROT UR STRIP-MCNC: NEGATIVE MG/DL
PROTHROMBIN TIME: 12.6 SECONDS (ref 11.6–14.5)
QRS AXIS: 17 DEGREES
QRSD INTERVAL: 74 MS
QT INTERVAL: 372 MS
QTC INTERVAL: 418 MS
RBC # BLD AUTO: 5.15 MILLION/UL (ref 3.81–5.12)
SODIUM SERPL-SCNC: 133 MMOL/L (ref 136–145)
SP GR UR STRIP.AUTO: 1.02 (ref 1–1.03)
T WAVE AXIS: 27 DEGREES
UROBILINOGEN UR QL STRIP.AUTO: 0.2 E.U./DL
VENTRICULAR RATE: 76 BPM
WBC # BLD AUTO: 7.29 THOUSAND/UL (ref 4.31–10.16)

## 2019-10-22 PROCEDURE — 82948 REAGENT STRIP/BLOOD GLUCOSE: CPT

## 2019-10-22 PROCEDURE — 81003 URINALYSIS AUTO W/O SCOPE: CPT

## 2019-10-22 PROCEDURE — 81025 URINE PREGNANCY TEST: CPT | Performed by: EMERGENCY MEDICINE

## 2019-10-22 PROCEDURE — 83605 ASSAY OF LACTIC ACID: CPT | Performed by: EMERGENCY MEDICINE

## 2019-10-22 PROCEDURE — 82010 KETONE BODYS QUAN: CPT | Performed by: EMERGENCY MEDICINE

## 2019-10-22 PROCEDURE — 83690 ASSAY OF LIPASE: CPT | Performed by: EMERGENCY MEDICINE

## 2019-10-22 PROCEDURE — 99284 EMERGENCY DEPT VISIT MOD MDM: CPT | Performed by: EMERGENCY MEDICINE

## 2019-10-22 PROCEDURE — 96374 THER/PROPH/DIAG INJ IV PUSH: CPT

## 2019-10-22 PROCEDURE — 85730 THROMBOPLASTIN TIME PARTIAL: CPT | Performed by: EMERGENCY MEDICINE

## 2019-10-22 PROCEDURE — 84703 CHORIONIC GONADOTROPIN ASSAY: CPT | Performed by: EMERGENCY MEDICINE

## 2019-10-22 PROCEDURE — 82805 BLOOD GASES W/O2 SATURATION: CPT | Performed by: EMERGENCY MEDICINE

## 2019-10-22 PROCEDURE — 36415 COLL VENOUS BLD VENIPUNCTURE: CPT | Performed by: EMERGENCY MEDICINE

## 2019-10-22 PROCEDURE — 85025 COMPLETE CBC W/AUTO DIFF WBC: CPT | Performed by: EMERGENCY MEDICINE

## 2019-10-22 PROCEDURE — 80053 COMPREHEN METABOLIC PANEL: CPT | Performed by: EMERGENCY MEDICINE

## 2019-10-22 PROCEDURE — 96361 HYDRATE IV INFUSION ADD-ON: CPT

## 2019-10-22 PROCEDURE — 74176 CT ABD & PELVIS W/O CONTRAST: CPT

## 2019-10-22 PROCEDURE — 85610 PROTHROMBIN TIME: CPT | Performed by: EMERGENCY MEDICINE

## 2019-10-22 PROCEDURE — 93005 ELECTROCARDIOGRAM TRACING: CPT

## 2019-10-22 PROCEDURE — 93010 ELECTROCARDIOGRAM REPORT: CPT | Performed by: INTERNAL MEDICINE

## 2019-10-22 PROCEDURE — 87040 BLOOD CULTURE FOR BACTERIA: CPT | Performed by: EMERGENCY MEDICINE

## 2019-10-22 RX ORDER — METOCLOPRAMIDE 10 MG/1
10 TABLET ORAL 4 TIMES DAILY
Qty: 28 TABLET | Refills: 0 | Status: SHIPPED | OUTPATIENT
Start: 2019-10-22 | End: 2019-10-29

## 2019-10-22 RX ORDER — SODIUM CHLORIDE 9 MG/ML
125 INJECTION, SOLUTION INTRAVENOUS CONTINUOUS
Status: DISCONTINUED | OUTPATIENT
Start: 2019-10-22 | End: 2019-10-22 | Stop reason: HOSPADM

## 2019-10-22 RX ADMIN — IOHEXOL 50 ML: 240 INJECTION, SOLUTION INTRATHECAL; INTRAVASCULAR; INTRAVENOUS; ORAL at 02:08

## 2019-10-22 RX ADMIN — SODIUM CHLORIDE 1000 ML: 0.9 INJECTION, SOLUTION INTRAVENOUS at 01:54

## 2019-10-22 RX ADMIN — INSULIN HUMAN 4 UNITS: 100 INJECTION, SOLUTION PARENTERAL at 01:54

## 2019-10-22 NOTE — ED PROVIDER NOTES
History  Chief Complaint   Patient presents with    Hyperglycemia - Symptomatic     Patients "blood sugar has not been under 250 in over a month and before I came to the ER my monitor said it was over 600" PT c/o nausea, drowsiness, drinking and peeing frequently     Patient is a 27year old female with blood sugar of over 600 tonight after dinner  Has been taking her insulin daily  (+) nausea  No vomiting or diarrhea  LMP-  2 weeks ago  Had blood in stool and had colonscopy 1 week ago which patient states was unremarkable  Is due to get CT scan  (+) abdominal pain  No abdominal surgery  No fever  (+) polyuria and polydipsia  States her blood sugar has not been under 250 for over 1 month  Was last seen in this ED on 9/22/19 for hyperglycemia  Was diagnosed with type I IDDM earlier this year  [facility] checked on this patient and last Rx filled was on 9/6/19 for ativan for 3 day supply  History provided by:  Patient and friend (boyfriend)   used: No    Hyperglycemia - Symptomatic   Associated symptoms: abdominal pain, increased thirst, nausea and polyuria    Associated symptoms: no fever and no vomiting        Prior to Admission Medications   Prescriptions Last Dose Informant Patient Reported? Taking?    ALTAVERA 0 15-30 MG-MCG per tablet  Self No Yes   Sig: Take one tablet daily   Blood Glucose Monitoring Suppl (Negra Zhang) w/Device KIT  Self Yes Yes   Sig: Use as directed   Insulin Pen Needle (BD PEN NEEDLE NASIM U/F) 32G X 4 MM MISC   No Yes   Sig: by Does not apply route 4 (four) times a day for 180 days   Insulin Pen Needle 29G X 5MM MISC  Self No Yes   Sig: by Does not apply route 4 (four) times a day (before meals and at bedtime)   LORazepam (ATIVAN) 0 5 mg tablet   No Yes   Sig: Take 1 tablet (0 5 mg total) by mouth every 8 (eight) hours as needed for anxiety   Na Sulfate-K Sulfate-Mg Sulf 17 5-3 13-1 6 GM/177ML SOLN   No No   Sig: Take 1 applicator by mouth once for 1 dose   ONETOUCH DELICA LANCETS 36X MISC   No Yes   Sig: Patient test 6 times daily   ONETOUCH VERIO test strip   No Yes   Sig: Test six times a day   amoxicillin (AMOXIL) 500 MG tablet   Yes Yes   Sig: Take 500 mg by mouth 3 (three) times a day   dicyclomine (BENTYL) 10 mg capsule   No Yes   Sig: Take 1 capsule (10 mg total) by mouth 3 (three) times a day as needed (abdominal pain and loose stools)   glucagon (GLUCAGON EMERGENCY) 1 MG injection  Self No Yes   Sig: Inject 1 mg under the skin once as needed for low blood sugar for up to 2 doses   insulin aspart (NovoLOG) 100 Units/mL injection pen   No Yes   Sig: Inject 3 Units under the skin 3 (three) times a day with meals   insulin aspart (NovoLOG) 100 units/mL injection   No Yes   Sig: Use 30 units per day via pump Disp 1 vial per month   insulin degludec (TRESIBA FLEXTOUCH) 100 units/mL injection pen  Self No Yes   Si units daily   Patient taking differently: Inject 8 Units under the skin daily 8 units daily   ondansetron (ZOFRAN) 4 mg tablet   No Yes   Sig: Take 1 tablet (4 mg total) by mouth every 8 (eight) hours as needed for nausea or vomiting   pantoprazole (PROTONIX) 40 mg tablet   No Yes   Sig: Take 1 tablet (40 mg total) by mouth daily      Facility-Administered Medications: None       Past Medical History:   Diagnosis Date    Abdominal pain     Anxiety     Anxiety and depression     Depression     Diabetes ([facility] [address] )     type 1    Diabetes mellitus ([facility] ) 2019    Eating disorder     history of anorexia/bulemia [id number]    Fracture of fibula     R Salter I    Nasal congestion     PTSD (post-traumatic stress disorder)     Rectal bleed        Past Surgical History:   Procedure Laterality Date    NASAL SEPTOPLASTY W/ TURBINOPLASTY      WISDOM TOOTH EXTRACTION      WRIST SURGERY      left;  Excision of ganglion       Family History   Problem Relation Age of Onset    Hypertension Mother    [facility] Migraines Mother         Headache    Diabetes type II Mother     Varicose Veins Mother     Hyperlipidemia Mother     Diabetes Mother    [facility] Arthritis Mother     Depression Mother     Cholelithiasis Father     Hypertension Father     Sarcoidosis Father         Liver    Hyperlipidemia Father     Diabetes Father     Coronary artery disease Father     Nephrolithiasis Father     Cirrhosis Father     Alcohol abuse Father     Thyroid disease Sister     Cancer Family     Diabetes Family     Hypertension Family      I have reviewed and agree with the history as documented  Social History     Tobacco Use    Smoking status: Never Smoker    Smokeless tobacco: Never Used    Tobacco comment: Tobacco smoke exposure (Father smokes cigars)   Substance Use Topics    Alcohol use: Yes     Frequency: Monthly or less     Comment: socially   Drug use: No        Review of Systems   Constitutional: Negative for fever  Gastrointestinal: Positive for abdominal pain, blood in stool (prior) and nausea  Negative for diarrhea and vomiting  Endocrine: Positive for polydipsia and polyuria  All other systems reviewed and are negative  Physical Exam  Physical Exam   Constitutional: She is oriented to person, place, and time  She appears distressed (moderate)  HENT:   Head: Normocephalic and atraumatic  Mucous membranes somewhat dry  Eyes: No scleral icterus  Neck: No JVD present  No tracheal deviation present  Cardiovascular: Normal rate, regular rhythm and normal heart sounds  No murmur heard  Pulmonary/Chest: Effort normal and breath sounds normal  No stridor  No respiratory distress  She has no wheezes  She has no rales  Abdominal: Soft  Bowel sounds are normal  She exhibits no distension  There is tenderness (diffuse)  There is no rebound and no guarding  Musculoskeletal: She exhibits no edema or deformity  Neurological: She is alert and oriented to person, place, and time  Skin: Skin is warm and dry  No rash noted     Psychiatric: Somewhat anxious  Nursing note and vitals reviewed  Vital Signs  ED Triage Vitals [10/22/19 0052]   Temperature Pulse Respirations Blood Pressure SpO2   98 3 °F (36 8 °C) 98 18 127/79 96 %      Temp Source Heart Rate Source Patient Position - Orthostatic VS BP Location FiO2 (%)   Oral Monitor Sitting Left arm --      Pain Score       3           Vitals:    10/22/19 0052 10/22/19 0156 10/22/19 0414   BP: 127/79 111/77 116/79   Pulse: 98 79 79   Patient Position - Orthostatic VS: Sitting Sitting Sitting         Visual Acuity      ED Medications  Medications   sodium chloride 0 9 % infusion (has no administration in time range)   insulin regular (HumuLIN R,NovoLIN R) injection 4 Units (4 Units Intravenous Given 10/22/19 0154)   sodium chloride 0 9 % bolus 1,000 mL (0 mL Intravenous Stopped 10/22/19 0258)   iohexol (OMNIPAQUE) 240 MG/ML solution 50 mL (50 mL Oral Given 10/22/19 0208)       Diagnostic Studies  Results Reviewed     Procedure Component Value Units Date/Time    Fingerstick Glucose (POCT) [[id number]]  (Abnormal) Collected:  10/22/19 0430    Lab Status:  Final result Updated:  10/22/19 0448     POC Glucose 194 mg/dl     Lactic acid, plasma [[id number]]  (Normal) Collected:  10/22/19 0413    Lab Status:  Final result Specimen:  Blood from Arm, Right Updated:  10/22/19 0439     LACTIC ACID 1 8 mmol/L     Narrative:       Result may be elevated if tourniquet was used during collection      POCT pregnancy, urine [[id number]]  (Normal) Resulted:  10/22/19 0235    Lab Status:  Final result Updated:  10/22/19 0235     EXT PREG TEST UR (Ref: Negative) Negative     Control Valid    hCG, qualitative pregnancy [[id number]]  (Normal) Collected:  10/22/19 0143    Lab Status:  Final result Specimen:  Blood from Arm, Right Updated:  10/22/19 0217     Preg, Serum Negative    Comprehensive metabolic panel [[id number]]  (Abnormal) Collected:  10/22/19 0143    Lab Status:  Final result Specimen:  Blood from Arm, Right Updated:  10/22/19 0216     Sodium 133 mmol/L      Potassium 3 8 mmol/L      Chloride 97 mmol/L      CO2 22 mmol/L      ANION GAP 14 mmol/L      BUN 10 mg/dL      Creatinine 0 81 mg/dL      Glucose 353 mg/dL      Calcium 9 5 mg/dL      AST 14 U/L      ALT 20 U/L      Alkaline Phosphatase 91 U/L      Total Protein 8 5 g/dL      Albumin 4 3 g/dL      Total Bilirubin 1 10 mg/dL      eGFR 103 ml/min/1 73sq m     Narrative:       [place] guidelines for Chronic Kidney Disease (CKD):     Stage 1 with normal or high GFR (GFR > 90 mL/min/1 73 square meters)    Stage 2 Mild CKD (GFR = 60-89 mL/min/1 73 square meters)    Stage 3A Moderate CKD (GFR = 45-59 mL/min/1 73 square meters)    Stage 3B Moderate CKD (GFR = 30-44 mL/min/1 73 square meters)    Stage 4 Severe CKD (GFR = 15-29 mL/min/1 73 square meters)    Stage 5 End Stage CKD (GFR <15 mL/min/1 73 square meters)  Note: GFR calculation is accurate only with a steady state creatinine    Lipase [[id number]]  (Normal) Collected:  10/22/19 0143    Lab Status:  Final result Specimen:  Blood from Arm, Right Updated:  10/22/19 0216     Lipase 156 u/L     Lactic acid, plasma [[id number]]  (Abnormal) Collected:  10/22/19 0143    Lab Status:  Final result Specimen:  Blood from Arm, Right Updated:  10/22/19 0216     LACTIC ACID 2 1 mmol/L     Narrative:       Result may be elevated if tourniquet was used during collection      ED Urine Macroscopic [[id number]]  (Abnormal) Collected:  10/22/19 0219    Lab Status:  Final result Specimen:  Urine Updated:  10/22/19 0203     Color, UA Yellow     Clarity, UA Clear     pH, UA 5 5     Leukocytes, UA Negative     Nitrite, UA Negative     Protein, UA Negative mg/dl      Glucose,  (1/2%) mg/dl      Ketones, UA 15 (1+) mg/dl      Urobilinogen, UA 0 2 E U /dl      Bilirubin, UA Negative     Blood, UA Negative     Specific Gravity, UA 1 020    Narrative:       CLINITEK RESULT    Protime-INR [[id number]]  (Normal) Collected:  10/22/19 0143    Lab Status:  Final result Specimen:  Blood from Arm, Right Updated:  10/22/19 0200     Protime 12 6 seconds      INR 1 00    APTT [[id number]]  (Normal) Collected:  10/22/19 0143    Lab Status:  Final result Specimen:  Blood from Arm, Right Updated:  10/22/19 0200     PTT 25 seconds     Blood culture #1 [[id number]] Collected:  10/22/19 0143    Lab Status: In process Specimen:  Blood from Arm, Right Updated:  10/22/19 0152    Blood culture #2 [[id number]] Collected:  10/22/19 0143    Lab Status:   In process Specimen:  Blood from Arm, Left Updated:  10/22/19 0152    Beta Hydroxybutyrate [[id number]]  (Normal) Collected:  10/22/19 0143    Lab Status:  Final result Specimen:  Blood from Arm, Right Updated:  10/22/19 0152     BETA-HYDROXYBUTYRATE 0 4 mmol/L     CBC and differential [[id number]]  (Abnormal) Collected:  10/22/19 0143    Lab Status:  Final result Specimen:  Blood from Arm, Right Updated:  10/22/19 0151     WBC 7 29 Thousand/uL      RBC 5 15 Million/uL      Hemoglobin 14 9 g/dL      Hematocrit 43 3 %      MCV 84 fL      MCH 28 9 pg      MCHC 34 4 g/dL      RDW 11 8 %      MPV 11 8 fL      Platelets 508 Thousands/uL      nRBC 0 /100 WBCs      Neutrophils Relative 47 %      Immat GRANS % 0 %      Lymphocytes Relative 44 %      Monocytes Relative 8 %      Eosinophils Relative 1 %      Basophils Relative 0 %      Neutrophils Absolute 3 39 Thousands/µL      Immature Grans Absolute 0 02 Thousand/uL      Lymphocytes Absolute 3 20 Thousands/µL      Monocytes Absolute 0 56 Thousand/µL      Eosinophils Absolute 0 09 Thousand/µL      Basophils Absolute 0 03 Thousands/µL     Blood gas, venous [[id number]]  (Abnormal) Collected:  10/22/19 0143    Lab Status:  Final result Specimen:  Blood from Arm, Right Updated:  10/22/19 0151     pH, Dariel 7 394     pCO2, Dariel 37 0 mm Hg      pO2, Dariel 43 3 mm Hg      HCO3, Dariel 22 1 mmol/L      Base Excess, Dariel -2 3 mmol/L      O2 Content, Dariel 17 2 ml/dL      O2 HGB, VENOUS 79 8 %     Fingerstick Glucose (POCT) [[id number]]  (Abnormal) Collected:  10/22/19 0054    Lab Status:  Final result Updated:  10/22/19 0056     POC Glucose 419 mg/dl                  CT abdomen pelvis wo contrast   ED Interpretation by Ruby Augustine MD (10/22 2269)   FINDINGS:      ABDOMEN      LOWER CHEST:  No clinically significant abnormality identified in the visualized lower chest       LIVER/BILIARY TREE:  Unremarkable  GALLBLADDER:  No calcified gallstones  No pericholecystic inflammatory change  SPLEEN:  Unremarkable  PANCREAS:  Unremarkable  ADRENAL GLANDS:  Unremarkable  KIDNEYS/URETERS:  Unremarkable  No hydronephrosis  STOMACH AND BOWEL:  Moderate stool retention      APPENDIX:  No findings to suggest appendicitis  ABDOMINOPELVIC CAVITY:  No ascites or free intraperitoneal air  No lymphadenopathy  VESSELS:  Unremarkable for patient's age  PELVIS      REPRODUCTIVE ORGANS:  Unremarkable for patient's age  URINARY BLADDER:  Unremarkable  ABDOMINAL WALL/INGUINAL REGIONS:  Unremarkable  OSSEOUS STRUCTURES:  No acute fracture or destructive osseous lesion  Impression:        No evidence of acute intra-abdominal or pelvic pathology   Constipation  Workstation performed: [id number]         Final Result by Rafael Chavez MD (10/22 1826)      No evidence of acute intra-abdominal or pelvic pathology  Constipation                 Workstation performed: [id number]                    Procedures  ECG 12 Lead Documentation Only  Date/Time: 10/22/2019 2:08 AM  Performed by: Ruby Augustine MD  Authorized by: Ruby Augustine MD     Indications / Diagnosis:  Abdominal pain, hyperglycemia  ECG reviewed by me, the ED Provider: yes    Patient location:  ED  Previous ECG:     Previous ECG:  Compared to current    Comparison ECG info:  5/12/19    Similarity:  Changes noted (artifact now)  Quality:     Tracing quality: Limited by artifact  Rate:     ECG rate:  76    ECG rate assessment: normal    Rhythm:     Rhythm: sinus rhythm    Ectopy:     Ectopy: none    QRS:     QRS axis:  Normal    QRS intervals:  Normal  Conduction:     Conduction: normal    ST segments:     ST segments:  Normal  T waves:     T waves: normal             ED Course  ED Course as of Oct 22 0454   Tue Oct 22, 2019   0246 Labs and EKG d/w patient and boyfriend with patient's permission and patient feeling better  0451 CT d/w patient and boyfriend  Patient denies constipation  FS improved  MDM  Number of Diagnoses or Management Options  Diagnosis management comments: Differential diagnosis including but not limited to: DKA, hyperglycemia, metabolic abnormality, dehydration, sepsis, UTI, doubt cardiac etiology, doubt intracranial process, noncompliance with medications  DDx including but not limited to: appendicitis, gastroenteritis, gastritis, PUD, GERD, gastroparesis, hepatitis, pancreatitis, colitis, enteritis, food poisoning, mesenteric adenitis, IBD, IBS, ileus, bowel obstruction, volvulus, cholecystitis, biliary colic, choledocholithiasis, perforated viscus, tumor, splenic etiology, diverticulitis, internal hernia, constipation, pelvic pathology, renal colic, pyelonephritis         Amount and/or Complexity of Data Reviewed  Clinical lab tests: ordered and reviewed  Tests in the radiology section of CPT®: ordered and reviewed  Decide to obtain previous medical records or to obtain history from someone other than the patient: yes  Review and summarize past medical records: yes  Discuss the patient with other providers: yes  Independent visualization of images, tracings, or specimens: yes        Disposition  Final diagnoses:   Hyperglycemia   Abdominal pain   Nausea     Time reflects when diagnosis was documented in both MDM as applicable and the Disposition within this note     Time User Action Codes Description Comment 10/22/2019  4:52 AM Keith Jonnylorna Add [R73 9] Hyperglycemia     10/22/2019  4:52 AM Keith Williamson Add [R10 9] Abdominal pain     10/22/2019  4:52 AM Keith Williamson Add [R11 0] Nausea       ED Disposition     ED Disposition Condition Date/Time Comment    Discharge Stable Tue Oct 22, 2019  4:52 AM Jim Pan discharge to home/self care  Follow-up Information     Follow up With Specialties Details Why [facility], [facility] Internal Medicine, Nurse Practitioner Call in 1 day and own endocrinologist  Return sooner if increased pain, nausea, vomiting, diarrhea, fever, difficulty breathing or urinating  [address]  [address] [facility]  [phone]            Patient's Medications   Discharge Prescriptions    METOCLOPRAMIDE (REGLAN) 10 MG TABLET    Take 1 tablet (10 mg total) by mouth 4 (four) times a day for 7 days As needed for nausea       Start Date: 10/22/2019End Date: 10/29/2019       Order Dose: 10 mg       Quantity: 28 tablet    Refills: 0     No discharge procedures on file      ED Provider  Electronically Signed by           Mami Thompson MD  10/22/19 8431

## 2019-10-23 ENCOUNTER — SOCIAL WORK (OUTPATIENT)
Dept: BEHAVIORAL/MENTAL HEALTH CLINIC | Facility: CLINIC | Age: 24
End: 2019-10-23
Payer: COMMERCIAL

## 2019-10-23 DIAGNOSIS — F42.2 MIXED OBSESSIONAL THOUGHTS AND ACTS: ICD-10-CM

## 2019-10-23 DIAGNOSIS — F41.1 GAD (GENERALIZED ANXIETY DISORDER): ICD-10-CM

## 2019-10-23 DIAGNOSIS — F43.12 CHRONIC POST-TRAUMATIC STRESS DISORDER (PTSD): Primary | ICD-10-CM

## 2019-10-23 DIAGNOSIS — F33.1 MODERATE RECURRENT MAJOR DEPRESSION (HCC): ICD-10-CM

## 2019-10-23 PROCEDURE — 90834 PSYTX W PT 45 MINUTES: CPT | Performed by: SOCIAL WORKER
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
## 2019-10-25 ENCOUNTER — TELEPHONE (OUTPATIENT)
Dept: PSYCHIATRY | Facility: CLINIC | Age: 24
End: 2019-10-25

## 2019-10-25 ENCOUNTER — OFFICE VISIT (OUTPATIENT)
Dept: PSYCHIATRY | Facility: CLINIC | Age: 24
End: 2019-10-25
Payer: COMMERCIAL

## 2019-10-25 DIAGNOSIS — F43.12 CHRONIC POST-TRAUMATIC STRESS DISORDER (PTSD): ICD-10-CM

## 2019-10-25 DIAGNOSIS — F33.1 MODERATE RECURRENT MAJOR DEPRESSION (HCC): Primary | ICD-10-CM

## 2019-10-25 DIAGNOSIS — F41.1 GAD (GENERALIZED ANXIETY DISORDER): ICD-10-CM

## 2019-10-25 PROCEDURE — 90792 PSYCH DIAG EVAL W/MED SRVCS: CPT | Performed by: PHYSICIAN ASSISTANT

## 2019-10-25 NOTE — TELEPHONE ENCOUNTER
Pharmacist left message stating that Trintellix 5 mg is not covered by patient's insurance  They asked if Trazodone or another medication could be substituted
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
## 2019-10-27 LAB
BACTERIA BLD CULT: NORMAL
BACTERIA BLD CULT: NORMAL

## 2019-10-28 NOTE — TELEPHONE ENCOUNTER
Received your message about prescription card for Trintellix  I called patient with this question and asked her to call us back and let us know if she had

## 2019-10-29 ENCOUNTER — TELEPHONE (OUTPATIENT)
Dept: PSYCHIATRY | Facility: CLINIC | Age: 24
End: 2019-10-29

## 2019-10-30 ENCOUNTER — SOCIAL WORK (OUTPATIENT)
Dept: BEHAVIORAL/MENTAL HEALTH CLINIC | Facility: CLINIC | Age: 24
End: 2019-10-30
Payer: COMMERCIAL

## 2019-10-30 ENCOUNTER — TELEPHONE (OUTPATIENT)
Dept: PSYCHIATRY | Facility: CLINIC | Age: 24
End: 2019-10-30

## 2019-10-30 DIAGNOSIS — F42.2 MIXED OBSESSIONAL THOUGHTS AND ACTS: ICD-10-CM

## 2019-10-30 DIAGNOSIS — F33.1 MODERATE RECURRENT MAJOR DEPRESSION (HCC): ICD-10-CM

## 2019-10-30 DIAGNOSIS — F41.1 GAD (GENERALIZED ANXIETY DISORDER): ICD-10-CM

## 2019-10-30 DIAGNOSIS — F43.12 CHRONIC POST-TRAUMATIC STRESS DISORDER (PTSD): Primary | ICD-10-CM

## 2019-10-30 DIAGNOSIS — F33.1 MODERATE RECURRENT MAJOR DEPRESSION (HCC): Primary | ICD-10-CM

## 2019-10-30 PROCEDURE — 90834 PSYTX W PT 45 MINUTES: CPT | Performed by: SOCIAL WORKER

## 2019-10-30 RX ORDER — VENLAFAXINE HYDROCHLORIDE 37.5 MG/1
37.5 CAPSULE, EXTENDED RELEASE ORAL DAILY
Qty: 30 CAPSULE | Refills: 0 | Status: SHIPPED | OUTPATIENT
Start: 2019-10-30 | End: 2019-11-19 | Stop reason: SINTOL

## 2019-10-30 NOTE — TELEPHONE ENCOUNTER
Called pt back and spoke with her about instead of Trintellix due to cost and insurance not covering it  To begin Effexor XR 37 5 mg PO QD for mdd, yolanda, and ptsd sxs  Discussed SEs including GI sxs, sedation, serotonin syndrome, increased suicidal thinking/depression  Discussed safer option with uncontrolled DM 1 and calling office if any SEs occur as pt had GI sxs with Cymbalta (SNRI)   Sent Rx to pharmacy today for 30 day supply 0 refill of Effexor XR 37 5 mg PO QD

## 2019-10-30 NOTE — PSYCH
Psychotherapy Provided: Individual Psychotherapy 50 minutes     Length of time in session: 50 minutes, follow up in 1 week    Goals addressed in session: Goal 1, Goal 2 and Goal 3      Pain:      none    0    Current suicide risk : Low     D: Met with Jenny individually  'This is the worst I've been in many years'  Chan Alaniz stated her depression continues to decline since January  'I'm tired of fighting'  First medication was denied by insurance  Chan Alaniz will  alternate today  Chan Alaniz is struggling with one  who 'thinks I'm playing a game' as she missed a quiz due to hospital - provided a note but teacher refused to accommodate  Chan Alaniz states she often misses classes due to inability to get out of bed due to depression  She typically gets going about 3pm and this involves maybe doing schoolwork, research on phone or games  Chan Alaniz also mentioned she cries almost daily; she can be playing with the dog and all of a sudden she cries and doesn't know why  Support and validation provided  Fleeting SI without plan  'I wouldn't do it'  A: Chan Alaniz presented depressed with tearful affect throughout session  Frustration regarding medical issues, inability to be on own and 'people think I'm faking this' in very overwhelming for her  Awaiting effectiveness of medication  P: Continue individual therapy, process emotions, support    Behavioral Health Treatment Plan [facility]: Diagnosis and Treatment Plan explained to Lisa Wilder relates understanding diagnosis and is agreeable to Treatment Plan   Yes

## 2019-10-31 ENCOUNTER — OFFICE VISIT (OUTPATIENT)
Dept: OBGYN CLINIC | Facility: CLINIC | Age: 24
End: 2019-10-31
Payer: COMMERCIAL

## 2019-10-31 VITALS
BODY MASS INDEX: 23.19 KG/M2 | HEIGHT: 62 IN | WEIGHT: 126 LBS | HEART RATE: 84 BPM | SYSTOLIC BLOOD PRESSURE: 115 MMHG | DIASTOLIC BLOOD PRESSURE: 81 MMHG

## 2019-10-31 DIAGNOSIS — G89.29 CHRONIC PAIN OF RIGHT KNEE: ICD-10-CM

## 2019-10-31 DIAGNOSIS — M25.561 CHRONIC PAIN OF RIGHT KNEE: ICD-10-CM

## 2019-10-31 DIAGNOSIS — M22.2X1 PATELLOFEMORAL PAIN SYNDROME OF RIGHT KNEE: Primary | ICD-10-CM

## 2019-10-31 PROCEDURE — 99213 OFFICE O/P EST LOW 20 MIN: CPT | Performed by: ORTHOPAEDIC SURGERY

## 2019-10-31 NOTE — PROGRESS NOTES
Assessment:       1  Patellofemoral pain syndrome of right knee    2  Chronic pain of right knee          Plan:        Clinical and prior radiological findings discussed with patient today  Consistent with patellofemoral pain syndrome  Patient has not been to formal physical therapy in over a year yet and has not been consistently using her right knee brace during activities  Since she is an uncontrolled type 1 diabetic, she is not a good candididate for cortisone injection  I did bring up viscosupplementation as an option to relieve her knee pain, but should only be considered once her diabetes is under control  Will refer her to physical therapy at this time along with consistent use of knee brace with activities  Follow up as needed            Subjective:     Patient ID: Melba Mcnamara is a 27 y o  female  Chief Complaint: Follow up right knee pain    HPI  26year old female here today to follow up right knee pain  She has pertinent history uncontrolled type 1 diabetes  Last seen in 11/08/2018 where she was diagnosed with patellofemoral pain syndrome  Was seen by physical therapy previously over a year ago and has reported only intermittent brace use  Still complains of persistent right medial knee/patella pain with popping/clicking  No swelling, numbness, weakness  No new injuries since last visit  Social History     Occupational History    Not on file   Tobacco Use    Smoking status: Never Smoker    Smokeless tobacco: Never Used    Tobacco comment: Tobacco smoke exposure (Father smokes cigars)   Substance and Sexual Activity    Alcohol use: Yes     Frequency: Monthly or less     Drinks per session: 1 or 2     Binge frequency: Never     Comment: socially   Drug use: No    Sexual activity: Yes     Partners: Male     Birth control/protection: Condom Male, OCP      Review of Systems   Constitutional: Negative for chills, fatigue, fever and unexpected weight change     HENT: Negative for hearing loss, nosebleeds and sore throat  Eyes: Negative for pain, redness and visual disturbance  Respiratory: Negative for cough, shortness of breath and wheezing  Cardiovascular: Negative for chest pain, palpitations and leg swelling  Gastrointestinal: Negative for abdominal pain, nausea and vomiting  Endocrine: Negative for polydipsia and polyuria  Genitourinary: Negative for difficulty urinating and hematuria  Musculoskeletal:        As per HPI   Skin: Negative for rash and wound  Neurological:        As per HPI   Psychiatric/Behavioral: Negative for decreased concentration and suicidal ideas  The patient is not nervous/anxious  Objective:     Ortho ExamPhysical Exam   Constitutional: She is oriented to person, place, and time  She appears well-developed and well-nourished  No distress  HENT:   Head: Normocephalic and atraumatic  Right Ear: External ear normal    Left Ear: External ear normal    Eyes: Conjunctivae are normal  No scleral icterus  Neck: Neck supple  Pulmonary/Chest: Effort normal and breath sounds normal  No respiratory distress  Abdominal: Soft  Neurological: She is alert and oriented to person, place, and time  Skin: Skin is warm and dry  Psychiatric: Her behavior is normal        Ortho Exam:  RIGHT KNEE:  Erythema: no  Swelling: no  Increased Warmth: no  Tenderness: +++ medial patella a  Flexion: intact  Extension: intact with palpable medial plica/MPFL tenderness and maltracking of patella  Patellar Apprehension: negative  Lachman's: negative  Drawer: negative  Varus laxity: negative  Valgus laxity: negative  [facility]: negative         I have personally reviewed pertinent films in PACS and my interpretation is MRI right knee on 09/20/2018 showing no abnormality  I interviewed, took the history and examined the patient  I discussed the case with the Resident and reviewed the Residents note , prescribed medications, and orders placed    I supervised the Resident and I agree with the Resident management plan as it was presented to me  I was present in the clinic and examined the patient      Marylene Finger, MD 11/01/19

## 2019-11-06 ENCOUNTER — SOCIAL WORK (OUTPATIENT)
Dept: BEHAVIORAL/MENTAL HEALTH CLINIC | Facility: CLINIC | Age: 24
End: 2019-11-06
Payer: COMMERCIAL

## 2019-11-06 DIAGNOSIS — F43.12 CHRONIC POST-TRAUMATIC STRESS DISORDER (PTSD): ICD-10-CM

## 2019-11-06 DIAGNOSIS — F33.1 MODERATE RECURRENT MAJOR DEPRESSION (HCC): Primary | ICD-10-CM

## 2019-11-06 DIAGNOSIS — F42.2 MIXED OBSESSIONAL THOUGHTS AND ACTS: ICD-10-CM

## 2019-11-06 DIAGNOSIS — F41.1 GAD (GENERALIZED ANXIETY DISORDER): ICD-10-CM

## 2019-11-06 PROCEDURE — 90834 PSYTX W PT 45 MINUTES: CPT | Performed by: SOCIAL WORKER

## 2019-11-06 NOTE — PSYCH
Psychotherapy Provided: Individual Psychotherapy 50 minutes     Length of time in session: 50 minutes, follow up in 2 week    Goals addressed in session: Goal 1, Goal 2 and Goal 3      Pain:      none    0    Current suicide risk : Low     D: Met with Jenny individually  'I gave Keshawn Campa my keys and license'  Disclosed when driving home from session last week she had urges to 'crash her car'  Stated she had similar experiences years back  Still struggles to get out of bed  Trying to adjust sleeping times- baby steps  Attendance in school this week was good  Just started medication last Wednesday  Contninued historic circumstances of being 'a very lonely person growing up' where Ulysses Rosette would take extra classes to prevent from going home - spending time with people rather than isolated  Relationship with Keshawn Campa remains solid  Marriage discussed - he is also on an educational Visa  Training service dog Missy Raul to  on Jenny's scent when he sugar drops  Denied SI      A: Ulysses Rosette presented with depressed mood - very restless  Constricted affect  Playing with her hair during discussion which seems to be her routine  P: Continue individual therapy, monitor symptoms and process stressors    Behavioral Health Treatment Plan [facility]: Diagnosis and Treatment Plan explained to Maryjane Show relates understanding diagnosis and is agreeable to Treatment Plan   Yes

## 2019-11-13 ENCOUNTER — SOCIAL WORK (OUTPATIENT)
Dept: BEHAVIORAL/MENTAL HEALTH CLINIC | Facility: CLINIC | Age: 24
End: 2019-11-13
Payer: COMMERCIAL

## 2019-11-13 DIAGNOSIS — F41.1 GAD (GENERALIZED ANXIETY DISORDER): ICD-10-CM

## 2019-11-13 DIAGNOSIS — F43.12 CHRONIC POST-TRAUMATIC STRESS DISORDER (PTSD): Primary | ICD-10-CM

## 2019-11-13 DIAGNOSIS — F42.2 MIXED OBSESSIONAL THOUGHTS AND ACTS: ICD-10-CM

## 2019-11-13 DIAGNOSIS — F33.1 MODERATE RECURRENT MAJOR DEPRESSION (HCC): ICD-10-CM

## 2019-11-13 PROCEDURE — 90834 PSYTX W PT 45 MINUTES: CPT | Performed by: SOCIAL WORKER
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
## 2019-11-19 ENCOUNTER — OFFICE VISIT (OUTPATIENT)
Dept: PSYCHIATRY | Facility: CLINIC | Age: 24
End: 2019-11-19
Payer: COMMERCIAL

## 2019-11-19 VITALS
HEART RATE: 76 BPM | BODY MASS INDEX: 23.05 KG/M2 | SYSTOLIC BLOOD PRESSURE: 119 MMHG | DIASTOLIC BLOOD PRESSURE: 86 MMHG | WEIGHT: 126 LBS

## 2019-11-19 DIAGNOSIS — F41.1 GAD (GENERALIZED ANXIETY DISORDER): ICD-10-CM

## 2019-11-19 DIAGNOSIS — F33.1 MODERATE RECURRENT MAJOR DEPRESSION (HCC): Primary | ICD-10-CM

## 2019-11-19 DIAGNOSIS — F43.12 CHRONIC POST-TRAUMATIC STRESS DISORDER (PTSD): ICD-10-CM

## 2019-11-19 PROCEDURE — 99214 OFFICE O/P EST MOD 30 MIN: CPT | Performed by: PHYSICIAN ASSISTANT

## 2019-11-19 RX ORDER — MIRTAZAPINE 15 MG/1
TABLET, FILM COATED ORAL
Qty: 30 TABLET | Refills: 0 | Status: SHIPPED | OUTPATIENT
Start: 2019-11-19 | End: 2019-12-13 | Stop reason: SDUPTHER

## 2019-11-19 NOTE — PSYCH
MEDICATION MANAGEMENT NOTE        [facility]      Name and Date of Birth:  Irlanda Patel 27 y o  1995 MRN: [id number]    Date of Visit: November 19, 2019    SUBJECTIVE:    Mukul Melendez is seen today for a follow up for Major Depressive Disorder, Generalized Anxiety Disorder and PTSD  Today she reports there has been no change in her depression or anxiety symptoms and she reports that she started the Effexor about 3 weeks ago and stopped on 11/15/2019 after about 2 and half weeks as she reports she had severe GI upset and symptoms from this  She reports her appetite decreased greatly and she was only eating half of a muffin  She also reports she had nausea and vomiting  Discussed with patient she did not call provider she reports she want to give this medication a chance and then stop that right before her next visit  She continues to report depressed mood and anhedonia reports it is very hard to be motivated to get a bed in the mornings go to school  She reports she is missing days of school due to lack motivation  However, she reports there is no change in her depression symptoms and she feels like they are not increasing  She currently denies any SI/HI  However, she does admit that about 2 weeks ago she had passive thought of SI while driving in the car though she had no intent  She also reports that she gave her boyfriend her keys and her license so those thoughts due never come back  She currently denies any SI any intent or any plans  She also admits that anxiety has not changed denies any increase  She reports she still has constant worry and had a panic attack about 2 weeks ago  She reports during the panic attack she took Ativan which was helpful and made her drowsy  She reports she only took the Ativan once at that time and has never used at any other time    She continues to report that she has on and off sleep due to racing thoughts and is very erratic  She reports she gets anywhere from 40 hours a night  Reports she still continues to have PTSD symptoms not every day, but still reports avoidance and is learning what her triggers are which can cause flashbacks and may even trigger anxiety attacks  She reports since stopping the Effexor 3 days ago her appetite has greatly improved and she denies any nausea or vomiting  She denies any other changes in medication recently she still continues report that she is being closely watched by Endocrinology for uncontrolled diabetes type 1  She reports her insulin levels are changing daily  Discussed with patient at length today about medication management for depression anxiety as patient reports on at least to SSRIs she had suicidal thinking and increased depression as well as with 2 SNRIs she had intolerable GI upset  She was on Wellbutrin in the past with no help for about 6 more months  Discussed limitations due to uncontrolled diabetes type 1  However, did discuss that medication changes are needed to not have depression anxiety increase even more          HPI ROS:             ('was' notes: recent => remote)  Medication Side Effects:  N/V, no appetite with Effexor  Gi upset with DM meds   Depression (10 worst): No change (Was increased)   Anxiety (10 worst): No change (Was increased)   Safety concerns (SI, HI, etc): denies (Was denied)   Sleep: decreased (Was 10-12 hrs, increased)   Energy: low (Was low)   Appetite: Getting back to normal after being decreased with Effexor (Was decresaed )   Weight Change: no no       Review Of Systems:      Constitutional feeling tired and low energy   Cardiovascular negative   Respiratory negative   Gastrointestinal negative   Musculoskeletal negative   Neurological negative   Endocrine negative       The following portions of the patient's history were reviewed and updated as appropriate: past family history, past medical history, past social history, past surgical history and problem list     Past Psychiatric History:     Past Psychiatric Medication Trials: Prozac, Zoloft, Effexor, Cymbalta and Wellbutrin      Past Medical History:    Past Medical History:   Diagnosis Date    Abdominal pain     Anxiety     Anxiety and depression     Depression     Diabetes ([facility] )     type 1    Diabetes mellitus ([facility] ) 1/17/2019    Eating disorder     history of anorexia/bulemia [id number]    Fracture of fibula     R Salter I    Nasal congestion     Obsessive-compulsive disorder     PTSD (post-traumatic stress disorder)     Rectal bleed      Past Medical History Pertinent Negatives:   Diagnosis Date Noted    Allergic 12/13/2018    Allergic rhinitis 06/03/2019    Anemia 12/13/2018    history of anemia in childhood    Asthma 06/12/2018    Chronic kidney disease 12/13/2018    COPD (chronic obstructive pulmonary disease) ([facility] ) 12/13/2018    Coronary artery disease 12/13/2018    Disease of thyroid gland 12/13/2018    Diverticulitis of colon 12/13/2018    GERD (gastroesophageal reflux disease) 12/13/2018    Heart murmur 12/13/2018    HL (hearing loss) 12/13/2018    Hypertension 12/13/2018    Infectious viral hepatitis 12/13/2018    Inflammatory bowel disease 12/13/2018    Kidney stone 12/13/2018    Memory loss 12/13/2018    Migraine 05/03/2019    Myocardial infarction ([facility] ) 12/13/2018    Otitis media 12/13/2018    Pneumonia 12/13/2018    Renal disorder 04/25/2016    Scoliosis 12/13/2018    Seizures ([facility] ) 12/13/2018    Sleep apnea 05/03/2019    Sleep difficulties 05/03/2019    Speech impairment 05/03/2019    Stroke ([facility] ) 12/13/2018    Substance abuse ([facility] ) 12/13/2018    Suicide attempt ([facility] ) 10/25/2019    Tinnitus 05/03/2019    Urinary tract infection 12/13/2018    Visual impairment 12/13/2018     Past Surgical History:   Procedure Laterality Date    NASAL SEPTOPLASTY W/ TURBINOPLASTY      WISDOM TOOTH EXTRACTION      WRIST SURGERY left; Excision of ganglion     Allergies   Allergen Reactions    Insulin Glargine     Iodides Throat Swelling     Reaction Date: 28Jul2014; Action Taken: none; Category: Allergy;     Iodine        Substance Abuse History:    Social History     Substance and Sexual Activity   Alcohol Use Yes    Frequency: Monthly or less    Drinks per session: 1 or 2    Binge frequency: Never    Comment: socially  Social History     Substance and Sexual Activity   Drug Use No       Social History:    Social History     Socioeconomic History    Marital status: Single     Spouse name: Not on file    Number of children: Not on file    Years of education: Not on file    Highest education level: Not on file   Occupational History    Not on file   Social Needs    Financial resource strain: Not on file    Food insecurity:     Worry: Not on file     Inability: Not on file    Transportation needs:     Medical: Not on file     Non-medical: Not on file   Tobacco Use    Smoking status: Never Smoker    Smokeless tobacco: Never Used    Tobacco comment: Tobacco smoke exposure (Father smokes cigars)   Substance and Sexual Activity    Alcohol use: Yes     Frequency: Monthly or less     Drinks per session: 1 or 2     Binge frequency: Never     Comment: socially      Drug use: No    Sexual activity: Yes     Partners: Male     Birth control/protection: Condom Male, OCP   Lifestyle    Physical activity:     Days per week: Not on file     Minutes per session: Not on file    Stress: Not on file   Relationships    Social connections:     Talks on phone: Not on file     Gets together: Not on file     Attends Synagogue service: Not on file     Active member of club or organization: Not on file     Attends meetings of clubs or organizations: Not on file     Relationship status: Not on file    Intimate partner violence:     Fear of current or ex partner: Not on file     Emotionally abused: Not on file     Physically abused: Not on file     Forced sexual activity: Not on file   Other Topics Concern    Not on file   Social History Narrative    Student at [address] a poor diet; low in vegetables, high in sweets    Dental care, regularly    Lives with parents    Sleeps 8-10 hours a day       Family Psychiatric History:     Family History   Problem Relation Age of Onset    Hypertension Mother     Migraines Mother         Headache    Diabetes type II Mother     Varicose Veins Mother     Hyperlipidemia Mother    Astrid Rocha Diabetes Mother    Astrid Rocha Arthritis Mother     Depression Mother     Cholelithiasis Father     Hypertension Father     Sarcoidosis Father         Liver    Hyperlipidemia Father     Diabetes Father     Coronary artery disease Father     Nephrolithiasis Father     Cirrhosis Father     Alcohol abuse Father     Thyroid disease Sister     Cancer Family     Diabetes Family     Hypertension Family                 OBJECTIVE:     Vital signs in last 24 hours:    Vitals:    11/19/19 1226   BP: 119/86   BP Location: Left arm   Patient Position: Sitting   Pulse: 76   Weight: 57 2 kg (126 lb)       Mental Status Evaluation:    Appearance age appropriate, casually dressed   Behavior pleasant, cooperative, appears anxious, good eye contact   Speech normal rate, normal volume, normal pitch   Mood depressed, anxious   Affect mood-congruent   Thought Processes organized, goal directed   Associations intact associations   Thought Content no overt delusions   Perceptual Disturbances: no auditory hallucinations, no visual hallucinations   Abnormal Thoughts  Risk Potential Suicidal ideation - None  Homicidal ideation - None  Potential for aggression - No   Orientation oriented to person, place, time/date and situation   Memory recent and remote memory grossly intact   Consciousness alert and awake   Attention Span Concentration Span attention span and concentration are age appropriate   Intellect appears to be of average intelligence Insight intact   Judgement intact   Muscle Strength and  Gait normal muscle strength and normal muscle tone, normal gait and normal balance   Motor activity no abnormal movements   Language no difficulty naming common objects, no difficulty repeating a phrase, no difficulty writing a sentence   Fund of Knowledge adequate knowledge of current events  adequate fund of knowledge regarding past history  adequate fund of knowledge regarding vocabulary    Pain none   Pain Scale 0       Laboratory Results:   I have personally reviewed all pertinent laboratory/tests results  Most Recent Labs:   Lab Results   Component Value Date    WBC 7 29 10/22/2019    RBC 5 15 (H) 10/22/2019    HGB 14 9 10/22/2019    HCT 43 3 10/22/2019     10/22/2019    RDW 11 8 10/22/2019    NEUTROABS 3 39 10/22/2019    K 3 8 10/22/2019    CL 97 (L) 10/22/2019    CO2 22 10/22/2019    BUN 10 10/22/2019    CREATININE 0 81 10/22/2019    GLUCOSE 342 (H) 01/24/2019    CALCIUM 9 5 10/22/2019    AST 14 10/22/2019    ALT 20 10/22/2019    ALKPHOS 91 10/22/2019    HDL 54 09/05/2019    TRIG 151 (H) 09/05/2019    LDLCALC 129 (H) 09/05/2019    YAJ7VTMTEUNJ 4 617 (H) 09/05/2019    FREET4 1 00 09/05/2019    PREGTESTUR negative 04/25/2016    PREGSERUM Negative 10/22/2019       No results found for this or any previous visit  Assessment/Plan:       Diagnoses and all orders for this visit:    Moderate recurrent major depression (HCC)  -     mirtazapine (REMERON) 15 mg tablet; Take 0 5 tablet (7 5 mg) orally once daily at bedtime x 7 days, then increase to 1 tablet (15 mg) orally once daily at bedtime  FRANCIS (generalized anxiety disorder)    Chronic post-traumatic stress disorder (PTSD)          Treatment Recommendations/Precautions/Plan:    Patient has been educated about their diagnosis and treatment modalities  They voiced understanding and agreement with the following plan:    Discontinue EffexorXR 37  5 mg due to 2 5 wks of GI intolerance    Start Remeron 7 5 mg p o  Q h s  X1 week, then increase to 15 mg p o  Q h s  explained with patient about monitoring for side effects with the 7 5 mg and if she feels she is not tolerating it or can tolerate to call in about a week and can increase to 15 mg  Also discussed with patient about the side effect of this medication including increased appetite and weight gain  Discussed that benefit of working on depression, anxiety, panic attacks and PTSD may outweigh the risk of having increased appetite which may cause weight gain or contribute to uncontrolled blood glucose levels  Discussed with patient about continuing close follow-up with endocrinology as she currently is and to let this provider know if she has a major increase in appetite and to continue close glucose monitoring  Due to patient having suicidal thinking and increased depression with at least 2 SSRIs plan not to switch to SSRI at this time  She also filled 2 SNRIs due to GI intolerance reports no help at all with Wellbutrin  Also tried to get [employer] approved with insurance for depression anxiety, but at this time medication even with manufacture coupon cost a good choice to work on depression anxiety to reduce risk of having suicidal thinking or increased depression or possibly GI intolerance    Will closely monitor for any side effects and may consider alternate treatment in the future patient on patient response such as [facility] therapy    Medication management every 3 weeks  Continue psychotherapy with [facility] therapist [address] [place] [address]  with family physician for medical issues  Aware of 24 hour and weekend coverage for urgent situations accessed by calling [facility] main practice number  continue close with endocrinology    -Discussed self monitoring of symptoms, and symptom monitoring tools     -Patient has been informed of 24 hours and weekend coverage for urgent situations accessed by calling the main clinic phone number or calling 911 or crisis line and getting to ED with sudden onset of Suicidality or suicidal thinking     -Completed and signed during the session: Not applicable - Treatment Plan to be completed by [facility] [address] therapist    Risks/Benefits      Risks, Benefits And Possible Side Effects Of Medications:    Risks, benefits, and possible side effects of medications explained to [facility] including risk of suicidality and serotonin syndrome related to treatment with antidepressants, risks of misuse, abuse or dependence, sedation and respiratory depression related to treatment with benzodiazepine medications and Risk of sedation, dizziness, nausea vomiting, increased appetite leading to weight gain associated with antidepressant Remeron  She verbalizes understanding and agreement for treatment  Controlled Medication Discussion:     Patient has been feeling prescriptions on time according to PDMP  Pt continues to report she is not taking Ativan but once and still has some pills left from last Rx and does not need a refill at this time  Psychotherapy Provided:     Individual psychotherapy provided: Medication changes discussed with [facility]  Medication education provided to [facility]  Educated on importance of medication and treatment compliance       Bebeto Heaton PA-C 11/19/19

## 2019-12-04 ENCOUNTER — SOCIAL WORK (OUTPATIENT)
Dept: BEHAVIORAL/MENTAL HEALTH CLINIC | Facility: CLINIC | Age: 24
End: 2019-12-04

## 2019-12-04 DIAGNOSIS — F41.1 GAD (GENERALIZED ANXIETY DISORDER): ICD-10-CM

## 2019-12-04 DIAGNOSIS — F43.12 CHRONIC POST-TRAUMATIC STRESS DISORDER (PTSD): ICD-10-CM

## 2019-12-04 DIAGNOSIS — F42.2 MIXED OBSESSIONAL THOUGHTS AND ACTS: ICD-10-CM

## 2019-12-04 DIAGNOSIS — R79.89 ABNORMAL LIVER FUNCTION TEST: Primary | ICD-10-CM

## 2019-12-04 NOTE — BH TREATMENT PLAN
Foreign Amin  1995       Date of Initial Treatment Plan: 8/12/19  Date of Current Treatment Plan: 12/04/19      Treatment Plan Number 2     Strengths/Personal Resources for Self Care:  I'm a good student, intelligent, resilient    Diagnosis:   1  Abnormal liver function test     2  Chronic post-traumatic stress disorder (PTSD)     3  FRANCIS (generalized anxiety disorder)     4  Mixed obsessional thoughts and acts         Area of Needs: Trauma, 'I'm a hypochondriac", intimacy, self worth, complex medical issues     Long Term Goal 1:   I have address my trauma  Target Date: 3/27/20   Completion Date:      Short Term Objectives for Goal 1:   1  I no longer live in fear  2  I have forgiven my mom  3  I have my independence  4  I have more self worth     Long Term Goal 2:   My medical issues are under control  Target Date: 3/27/20   Completion Date:      Short Term Objectives for Goal 2:    1  My service dog has come   2  My stomach issues/Diabetes         3  I have my independence    Long Term Goal 3:   My anxiety and depression have diminished  Target Date: 3/27/20   Completion Date:      Short Term Objectives for Goal 3:    1  Utilize my skills   2   My stomach issues/Diabetes                     GOAL 1: Modality: Individual Therapy 1x/week   Completion Date:                                  Medication management 1x/month   Completion Date:                                  Individuals responsible for goals: Ben Canales Se, PA-C     GOAL 2: Modality:I ndividual Therapy 1x/week   Completion Date:                                   Medication management 1x/month   Completion Date:                                   Individuals responsible for goals: Ben Canales Se, PA-C    GOAL 3: Modality:I ndividual Therapy 1x/week   Completion Date:                                   Medication management 1x/month   Completion Date:                                   Individuals responsible for goals:  Fadia Chambers and tanya Godinez PA-C             Behavioral Health Treatment Plan [facility]: Diagnosis and Treatment Plan explained to Madhavi Ponce relates understanding diagnosis and is agreeable to Treatment Plan             Client Comments : Please share your thoughts, feelings, need and/or experiences regarding your treatment plan:

## 2019-12-04 NOTE — PSYCH
Psychotherapy Provided: Individual Psychotherapy 50 minutes     Length of time in session: 50 minutes, follow up in 2 week    Goals addressed in session: Goal 1, Goal 2 and Goal 3      Pain:      none    0    Current suicide risk : Low     D: Met with Jenny individually  "I have been all worked up '   'The person I was does not exist  I am an empty shell'  Explained she has noticed scratching myself when I am so upset 'I just sit in the shower and cry for almost an hour rocking' '  Thoughts ruminate 'I ruined everything'  Feels relationship dynamics with [employer] has changed, depression continues to be 'horrible  Feels she used to work and 'had a life'  Acknowledged she just got her medication yesterday due to insurance not covering - plan is not currently covering her insulin - went 2 days without insulin  Withdrew from one of her classes - 'It was stressing me out'   'I am noticing I become easily stressed'  Experienced an anxiety attack in [address] while trying to make a decision on a lap top  Darren Baron stated she is 'afraid to go to sleep' - she was awoken by a nightmare telling her to get up  Woke up and sugar was 40  'If I didn't wake up I would have  because I was sleeping '   Consulting with Endocrinologist - will have blood work and referral will most likely to be placed to [facility] as this may be beyond Dr's expertise  Averaging 3 hours  Reassessment of treatment plan completed  Stated PTSD symptoms are present - psycho education on smells, tone of voice, comments etc   Darren Baron agreed it was tone of voice and volume of a particular professor that has been triggering her and she didn't know why  Denied SI     A: It is unusual for Darren Baron not to be able to keep up with school work  She is demonstrating greater insight into her triggers and how to eliminate them before they acerbate  Darren Baron remains hyper focused on her sugar and checks excessively    Not sleeping is a contributing factor to symptoms  P: Continue individual therapy, review skills, support for stressors      [address]: Diagnosis and Treatment Plan explained to William Chapman relates understanding diagnosis and is agreeable to Treatment Plan   Yes

## 2019-12-06 ENCOUNTER — APPOINTMENT (OUTPATIENT)
Dept: LAB | Facility: CLINIC | Age: 24
End: 2019-12-06
Payer: COMMERCIAL

## 2019-12-06 ENCOUNTER — TRANSCRIBE ORDERS (OUTPATIENT)
Dept: LAB | Facility: CLINIC | Age: 24
End: 2019-12-06

## 2019-12-06 DIAGNOSIS — E66.8 OBESITY OF ENDOCRINE ORIGIN: ICD-10-CM

## 2019-12-06 DIAGNOSIS — E10.9 INSULIN DEPENDENT DIABETES MELLITUS TYPE IA (HCC): ICD-10-CM

## 2019-12-06 DIAGNOSIS — I51.9 MYXEDEMA HEART DISEASE: ICD-10-CM

## 2019-12-06 DIAGNOSIS — E03.9 MYXEDEMA HEART DISEASE: ICD-10-CM

## 2019-12-06 DIAGNOSIS — E10.9 INSULIN DEPENDENT DIABETES MELLITUS TYPE IA (HCC): Primary | ICD-10-CM

## 2019-12-06 LAB
ALBUMIN SERPL BCP-MCNC: 4.1 G/DL (ref 3.5–5)
ALP SERPL-CCNC: 91 U/L (ref 46–116)
ALT SERPL W P-5'-P-CCNC: 35 U/L (ref 12–78)
AMYLASE SERPL-CCNC: 41 IU/L (ref 25–115)
ANION GAP SERPL CALCULATED.3IONS-SCNC: 9 MMOL/L (ref 4–13)
AST SERPL W P-5'-P-CCNC: 23 U/L (ref 5–45)
BASOPHILS # BLD AUTO: 0.03 THOUSANDS/ΜL (ref 0–0.1)
BASOPHILS NFR BLD AUTO: 1 % (ref 0–1)
BILIRUB SERPL-MCNC: 1.32 MG/DL (ref 0.2–1)
BILIRUB UR QL STRIP: NEGATIVE
BUN SERPL-MCNC: 14 MG/DL (ref 5–25)
CALCIUM SERPL-MCNC: 9.4 MG/DL (ref 8.3–10.1)
CHLORIDE SERPL-SCNC: 100 MMOL/L (ref 100–108)
CLARITY UR: CLEAR
CO2 SERPL-SCNC: 28 MMOL/L (ref 21–32)
COLOR UR: YELLOW
CREAT SERPL-MCNC: 0.95 MG/DL (ref 0.6–1.3)
EOSINOPHIL # BLD AUTO: 0.1 THOUSAND/ΜL (ref 0–0.61)
EOSINOPHIL NFR BLD AUTO: 2 % (ref 0–6)
ERYTHROCYTE [DISTWIDTH] IN BLOOD BY AUTOMATED COUNT: 12 % (ref 11.6–15.1)
EST. AVERAGE GLUCOSE BLD GHB EST-MCNC: 177 MG/DL
GFR SERPL CREATININE-BSD FRML MDRD: 85 ML/MIN/1.73SQ M
GLUCOSE P FAST SERPL-MCNC: 229 MG/DL (ref 65–99)
GLUCOSE UR STRIP-MCNC: NEGATIVE MG/DL
HBA1C MFR BLD: 7.8 % (ref 4.2–6.3)
HCT VFR BLD AUTO: 43.6 % (ref 34.8–46.1)
HGB BLD-MCNC: 14.6 G/DL (ref 11.5–15.4)
HGB UR QL STRIP.AUTO: NEGATIVE
IMM GRANULOCYTES # BLD AUTO: 0.03 THOUSAND/UL (ref 0–0.2)
IMM GRANULOCYTES NFR BLD AUTO: 1 % (ref 0–2)
KETONES UR STRIP-MCNC: NEGATIVE MG/DL
LEUKOCYTE ESTERASE UR QL STRIP: NEGATIVE
LIPASE SERPL-CCNC: 95 U/L (ref 73–393)
LYMPHOCYTES # BLD AUTO: 2.97 THOUSANDS/ΜL (ref 0.6–4.47)
LYMPHOCYTES NFR BLD AUTO: 45 % (ref 14–44)
MCH RBC QN AUTO: 29 PG (ref 26.8–34.3)
MCHC RBC AUTO-ENTMCNC: 33.5 G/DL (ref 31.4–37.4)
MCV RBC AUTO: 87 FL (ref 82–98)
MONOCYTES # BLD AUTO: 0.5 THOUSAND/ΜL (ref 0.17–1.22)
MONOCYTES NFR BLD AUTO: 8 % (ref 4–12)
NEUTROPHILS # BLD AUTO: 3.02 THOUSANDS/ΜL (ref 1.85–7.62)
NEUTS SEG NFR BLD AUTO: 43 % (ref 43–75)
NITRITE UR QL STRIP: NEGATIVE
NRBC BLD AUTO-RTO: 0 /100 WBCS
PH UR STRIP.AUTO: 6 [PH]
PLATELET # BLD AUTO: 238 THOUSANDS/UL (ref 149–390)
PMV BLD AUTO: 10.9 FL (ref 8.9–12.7)
POTASSIUM SERPL-SCNC: 3.8 MMOL/L (ref 3.5–5.3)
PROT SERPL-MCNC: 8.3 G/DL (ref 6.4–8.2)
PROT UR STRIP-MCNC: NEGATIVE MG/DL
RBC # BLD AUTO: 5.03 MILLION/UL (ref 3.81–5.12)
SODIUM SERPL-SCNC: 137 MMOL/L (ref 136–145)
SP GR UR STRIP.AUTO: 1.02 (ref 1–1.03)
T4 FREE SERPL-MCNC: 0.98 NG/DL (ref 0.76–1.46)
TSH SERPL DL<=0.05 MIU/L-ACNC: 2.41 UIU/ML (ref 0.36–3.74)
UROBILINOGEN UR QL STRIP.AUTO: 0.2 E.U./DL
WBC # BLD AUTO: 6.65 THOUSAND/UL (ref 4.31–10.16)

## 2019-12-06 PROCEDURE — 36415 COLL VENOUS BLD VENIPUNCTURE: CPT

## 2019-12-06 PROCEDURE — 81404 MOPATH PROCEDURE LEVEL 5: CPT

## 2019-12-06 PROCEDURE — 81406 MOPATH PROCEDURE LEVEL 7: CPT

## 2019-12-06 PROCEDURE — 82150 ASSAY OF AMYLASE: CPT

## 2019-12-06 PROCEDURE — 80053 COMPREHEN METABOLIC PANEL: CPT

## 2019-12-06 PROCEDURE — 81405 MOPATH PROCEDURE LEVEL 6: CPT

## 2019-12-06 PROCEDURE — 86800 THYROGLOBULIN ANTIBODY: CPT

## 2019-12-06 PROCEDURE — 83036 HEMOGLOBIN GLYCOSYLATED A1C: CPT

## 2019-12-06 PROCEDURE — 85025 COMPLETE CBC W/AUTO DIFF WBC: CPT

## 2019-12-06 PROCEDURE — 81003 URINALYSIS AUTO W/O SCOPE: CPT | Performed by: SPECIALIST

## 2019-12-06 PROCEDURE — 84439 ASSAY OF FREE THYROXINE: CPT

## 2019-12-06 PROCEDURE — 84443 ASSAY THYROID STIM HORMONE: CPT

## 2019-12-06 PROCEDURE — 86376 MICROSOMAL ANTIBODY EACH: CPT

## 2019-12-06 PROCEDURE — 83690 ASSAY OF LIPASE: CPT

## 2019-12-06 PROCEDURE — 83519 RIA NONANTIBODY: CPT

## 2019-12-07 LAB
THYROGLOB AB SERPL-ACNC: 8 IU/ML (ref 0–0.9)
THYROPEROXIDASE AB SERPL-ACNC: 85 IU/ML (ref 0–34)

## 2019-12-10 ENCOUNTER — OFFICE VISIT (OUTPATIENT)
Dept: PSYCHIATRY | Facility: CLINIC | Age: 24
End: 2019-12-10
Payer: COMMERCIAL

## 2019-12-10 DIAGNOSIS — F43.12 CHRONIC POST-TRAUMATIC STRESS DISORDER (PTSD): ICD-10-CM

## 2019-12-10 DIAGNOSIS — F41.1 GAD (GENERALIZED ANXIETY DISORDER): ICD-10-CM

## 2019-12-10 DIAGNOSIS — F33.1 MODERATE RECURRENT MAJOR DEPRESSION (HCC): Primary | ICD-10-CM

## 2019-12-10 LAB — GAD65 AB SER-ACNC: 369.3 U/ML (ref 0–5)

## 2019-12-10 PROCEDURE — 99214 OFFICE O/P EST MOD 30 MIN: CPT | Performed by: PHYSICIAN ASSISTANT

## 2019-12-10 NOTE — PSYCH
MEDICATION MANAGEMENT NOTE        [facility]      Name and Date of Birth:  Naldo Fischer 27 y o  1995 MRN: [id number]    Date of Visit: December 10, 2019    SUBJECTIVE:    Darren Baron is seen today for a follow up for Major Depressive Disorder, Generalized Anxiety Disorder and PTSD  Today reports she feels there has been no change in her constant depression and anxiety  She reports she feels even more on edge in fidgety in the last couple of weeks  She reports 1 issue was with her insurance company not being able to get the Remeron until last Wednesday, so she was without antidepressant for about 2 and half weeks  She reports since starting Remeron about 6 days ago at 7 5 mg she reports feeling sedated and feeling tired and slow every day    She also reports she is not having any more issues with sleep but feels she has been more tired and has been sleeping about 14 hours each day as she is now taking naps during the day  She also reports she almost missed a doctor's appointment this morning due to sleeping in since starting the Remeron  Currently she reports she still has crying episodes more often and has depressed mood  She also reports anhedonia and feels sometimes she does not care if she goes to school or not  She reports today is the last week and she has been trying to go to school more but reports if she misses it she is not concerned  She admits that she had 2 panic attacks last week so she used the Ativan twice for these attacks  She reported 1 panic attack she was crying, hyperventilating, and became tense and she reports she did not realize until after the episode that she had been scratching at her back and had a scratch heraclio on her back  She denies any SI/HI or any plans or intent  She also denies any nightmares or flashbacks recently due to PTSD    She also denies any other side effects from Remeron including GI intolerance like she had with the Effexor  HPI ROS:             ('was' notes: recent => remote)  Medication Side Effects:  Sedation, psychomotor retardation with Remeron  N/V, no appetite with Effexor   Depression (10 worst): No change, high (Was no change, high)   Anxiety (10 worst): No change, high (Was no change, high)   Safety concerns (SI, HI, etc): Denies (Was denied)   Sleep:  Increased, sleeping 14 hours per day including naps (Was decreased)   Energy: Low (Was low)   Appetite: Normal (Was getting back to normal with stopping Effexor)   Weight Change: Denies no       Review Of Systems:      Constitutional feeling tired and low energy   Cardiovascular negative   Respiratory negative   Gastrointestinal negative   Musculoskeletal negative   Neurological negative   Endocrine negative       The following portions of the patient's history were reviewed and updated as appropriate: past family history, past medical history, past social history, past surgical history and problem list     Past Psychiatric History:     Past Psychiatric Medication Trials: Prozac, Zoloft, Effexor, Cymbalta and Wellbutrin; on Remeron currently      Past Medical History:    Past Medical History:   Diagnosis Date    Abdominal pain     Anxiety     Anxiety and depression     Depression     Diabetes [facility]     type 1    Diabetes mellitus ([facility] ) 1/17/2019    Eating disorder     history of anorexia/bulemia [id number]    Fracture of fibula     R Salter I    Nasal congestion     Obsessive-compulsive disorder     PTSD (post-traumatic stress disorder)     Rectal bleed      Past Medical History Pertinent Negatives:   Diagnosis Date Noted    Allergic 12/13/2018    Allergic rhinitis 06/03/2019    Anemia 12/13/2018    history of anemia in childhood    Asthma 06/12/2018    Chronic kidney disease 12/13/2018    COPD (chronic obstructive pulmonary disease) ([facility] ) 12/13/2018    Coronary artery disease 12/13/2018    Disease of thyroid gland 12/13/2018    Diverticulitis of colon 12/13/2018    GERD (gastroesophageal reflux disease) 12/13/2018    Heart murmur 12/13/2018    HL (hearing loss) 12/13/2018    Hypertension 12/13/2018    Infectious viral hepatitis 12/13/2018    Inflammatory bowel disease 12/13/2018    Kidney stone 12/13/2018    Memory loss 12/13/2018    Migraine 05/03/2019    Myocardial infarction ([facility] ) 12/13/2018    Otitis media 12/13/2018    Pneumonia 12/13/2018    Renal disorder 04/25/2016    Scoliosis 12/13/2018    Seizures ([facility] ) 12/13/2018    Sleep apnea 05/03/2019    Sleep difficulties 05/03/2019    Speech impairment 05/03/2019    Stroke ([facility] ) 12/13/2018    Substance abuse ([facility] ) 12/13/2018    Suicide attempt ([facility] ) 10/25/2019    Tinnitus 05/03/2019    Urinary tract infection 12/13/2018    Visual impairment 12/13/2018     Past Surgical History:   Procedure Laterality Date    NASAL SEPTOPLASTY W/ TURBINOPLASTY      WISDOM TOOTH EXTRACTION      WRIST SURGERY      left; Excision of ganglion     Allergies   Allergen Reactions    Insulin Glargine     Iodides Throat Swelling     Reaction Date: 28Jul2014; Action Taken: none; Category: Allergy;     Iodine        Substance Abuse History:    Social History     Substance and Sexual Activity   Alcohol Use Yes    Frequency: Monthly or less    Drinks per session: 1 or 2    Binge frequency: Never    Comment: socially       Social History     Substance and Sexual Activity   Drug Use No       Social History:    Social History     Socioeconomic History    Marital status: Single     Spouse name: Not on file    Number of children: Not on file    Years of education: Not on file    Highest education level: Not on file   Occupational History    Not on file   Social Needs    Financial resource strain: Not on file    Food insecurity:     Worry: Not on file     Inability: Not on file    Transportation needs:     Medical: Not on file     Non-medical: Not on file   Tobacco Use  Smoking status: Never Smoker    Smokeless tobacco: Never Used    Tobacco comment: Tobacco smoke exposure (Father smokes cigars)   Substance and Sexual Activity    Alcohol use: Yes     Frequency: Monthly or less     Drinks per session: 1 or 2     Binge frequency: Never     Comment: socially   Drug use: No    Sexual activity: Yes     Partners: Male     Birth control/protection: Condom Male, OCP   Lifestyle    Physical activity:     Days per week: Not on file     Minutes per session: Not on file    Stress: Not on file   Relationships    Social connections:     Talks on phone: Not on file     Gets together: Not on file     Attends Adventism service: Not on file     Active member of club or organization: Not on file     Attends meetings of clubs or organizations: Not on file     Relationship status: Not on file    Intimate partner violence:     Fear of current or ex partner: Not on file     Emotionally abused: Not on file     Physically abused: Not on file     Forced sexual activity: Not on file   Other Topics Concern    Not on file   Social History Narrative    Student at [employer] [place] a poor diet; low in vegetables, high in sweets    Dental care, regularly    Lives with parents    Sleeps 8-10 hours a day       Family Psychiatric History:     Family History   Problem Relation Age of Onset    Hypertension Mother     Migraines Mother         Headache    Diabetes type II Mother     Varicose Veins Mother     Hyperlipidemia Mother     Diabetes Mother     Arthritis Mother     Depression Mother     Cholelithiasis Father     Hypertension Father     Sarcoidosis Father         Liver    Hyperlipidemia Father     Diabetes Father     Coronary artery disease Father     Nephrolithiasis Father     Cirrhosis Father     Alcohol abuse Father     Thyroid disease Sister     Cancer Family     Diabetes Family     Hypertension Family                 OBJECTIVE:     Vital signs in last 24 hours:     There were no vitals filed for this visit  Mental Status Evaluation:    Appearance age appropriate, casually dressed, Good hygiene   Behavior cooperative, restless and fidgety, psychomotor retardation   Speech normal volume, normal pitch, decreased rate   Mood dysphoric   Affect blunted, Depressed, anxious   Thought Processes organized, goal directed   Associations intact associations   Thought Content no overt delusions   Perceptual Disturbances: no auditory hallucinations, no visual hallucinations   Abnormal Thoughts  Risk Potential Suicidal ideation - None  Homicidal ideation - None  Potential for aggression - No   Orientation oriented to person, place, time/date and situation   Memory recent and remote memory grossly intact   Consciousness alert and awake   Attention Span Concentration Span attention span and concentration are age appropriate   Intellect appears to be of average intelligence   Insight intact   Judgement fair   Muscle Strength and  Gait normal muscle strength and normal muscle tone, normal gait and normal balance   Motor activity no abnormal movements   Language no difficulty naming common objects, no difficulty repeating a phrase, no difficulty writing a sentence   Fund of Knowledge adequate knowledge of current events  adequate fund of knowledge regarding past history  adequate fund of knowledge regarding vocabulary    Pain none   Pain Scale 0       Laboratory Results:   I have personally reviewed all pertinent laboratory/tests results    Most Recent Labs:   Lab Results   Component Value Date    WBC 6 65 12/06/2019    RBC 5 03 12/06/2019    HGB 14 6 12/06/2019    HCT 43 6 12/06/2019     12/06/2019    RDW 12 0 12/06/2019    NEUTROABS 3 02 12/06/2019    K 3 8 12/06/2019     12/06/2019    CO2 28 12/06/2019    BUN 14 12/06/2019    CREATININE 0 95 12/06/2019    GLUCOSE 342 (H) 01/24/2019    CALCIUM 9 4 12/06/2019    AST 23 12/06/2019    ALT 35 12/06/2019    ALKPHOS 91 12/06/2019    HDL 54 09/05/2019    TRIG 151 (H) 09/05/2019    LDLCALC 129 (H) 09/05/2019    DPQ7IZZXDSUZ 2 407 12/06/2019    FREET4 0 98 12/06/2019    PREGTESTUR negative 04/25/2016    PREGSERUM Negative 10/22/2019       No results found for this or any previous visit  Assessment/Plan:       There are no diagnoses linked to this encounter  Treatment Recommendations/Precautions/Plan:    Patient has been educated about their diagnosis and treatment modalities  They voiced understanding and agreement with the following plan: Increase Remeron to 15 mg PO QHS (no refill needed until 01/05/20) Discussed with patient since it has only been 6 days to continue taking Remeron and increasing to 15 mg to see if sedation does subside  Discussed case with collaborating physician and discussed then with patient that at higher doses of Remeron sedation usually decreases  Discussed giving her body time to adjust to the Remeron and giving medication a fair trial for about 4 weeks or more before considering changing the medication  She is in agreement and will increase dose to 15 mg tonight  Also discussed with patient about calling office in about 1 week to check in about sedative side effects and at that time considering increasing dose from 15 mg to 30 mg  Patient understands  Continue Ativan 0 5 mg q d  P r n  Also discussed with patient today about [address] [place] procedure for depression  Provided patient with brochure about [facility] and discussed with her about non medication alternative for MDD  Patient did report that her insurance will be changing for the new year to Medicaid and discussed that at this time our offices not accepting Medicaid patients for T MS, however discussed with her about this in the future  She reports she would like to research it more before want to be referred anyway at this time      Medication management every 3-4 weeks  Continue psychotherapy with [facility] therapist Brittney Meza with family physician for medical issues  Aware of 24 hour and weekend coverage for urgent situations accessed by calling [facility] main practice number    -Discussed self monitoring of symptoms, and symptom monitoring tools     -Patient has been informed to call office for any questions or concerns before next office visit or to call 911 or get to ED for immediate medical attention or suicidality    -Completed and signed during the session: Not applicable - Treatment Plan to be completed by [facility] [address] therapist    Risks/Benefits      Risks, Benefits And Possible Side Effects Of Medications:    Risks, benefits, and possible side effects of medications explained to Yassine Alvarado including risk of sedation, suicidality and serotonin syndrome related to treatment with antidepressants,  She verbalizes understanding and agreement for treatment  Controlled Medication Discussion:     No recent records found for controlled prescriptions according to [place] Prescription Drug Monitoring Program    Psychotherapy Provided:     Individual psychotherapy provided: Medication education provided to Yassine Fink PA-C 12/10/19

## 2019-12-11 ENCOUNTER — SOCIAL WORK (OUTPATIENT)
Dept: BEHAVIORAL/MENTAL HEALTH CLINIC | Facility: CLINIC | Age: 24
End: 2019-12-11
Payer: COMMERCIAL

## 2019-12-11 DIAGNOSIS — F33.1 MODERATE RECURRENT MAJOR DEPRESSION (HCC): ICD-10-CM

## 2019-12-11 DIAGNOSIS — F41.1 GAD (GENERALIZED ANXIETY DISORDER): Primary | ICD-10-CM

## 2019-12-11 DIAGNOSIS — F42.2 MIXED OBSESSIONAL THOUGHTS AND ACTS: ICD-10-CM

## 2019-12-11 DIAGNOSIS — F43.12 CHRONIC POST-TRAUMATIC STRESS DISORDER (PTSD): ICD-10-CM

## 2019-12-11 PROCEDURE — 90834 PSYTX W PT 45 MINUTES: CPT | Performed by: SOCIAL WORKER
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
## 2019-12-13 DIAGNOSIS — F33.1 MODERATE RECURRENT MAJOR DEPRESSION (HCC): ICD-10-CM

## 2019-12-13 RX ORDER — MIRTAZAPINE 15 MG/1
15 TABLET, FILM COATED ORAL
Qty: 30 TABLET | Refills: 0 | Status: SHIPPED | OUTPATIENT
Start: 2020-01-04 | End: 2020-02-27 | Stop reason: SINTOL

## 2020-01-06 ENCOUNTER — TELEPHONE (OUTPATIENT)
Dept: INTERNAL MEDICINE CLINIC | Facility: CLINIC | Age: 25
End: 2020-01-06

## 2020-01-06 NOTE — TELEPHONE ENCOUNTER
Pt is leaving for a cruise to [facility] on Thursday  Pt requires a letter stating that she is a type 1 diabetic and she needs to bring her insulin and other diabetic supplies on the cruise  Pt would appreciate a call back at [phone]  Thank you!

## 2020-01-07 LAB — MISCELLANEOUS LAB TEST RESULT: NORMAL

## 2020-01-15 ENCOUNTER — SOCIAL WORK (OUTPATIENT)
Dept: BEHAVIORAL/MENTAL HEALTH CLINIC | Facility: CLINIC | Age: 25
End: 2020-01-15

## 2020-01-15 DIAGNOSIS — F43.12 CHRONIC POST-TRAUMATIC STRESS DISORDER (PTSD): ICD-10-CM

## 2020-01-15 DIAGNOSIS — F33.1 MODERATE RECURRENT MAJOR DEPRESSION (HCC): ICD-10-CM

## 2020-01-15 DIAGNOSIS — F41.1 GAD (GENERALIZED ANXIETY DISORDER): Primary | ICD-10-CM

## 2020-01-15 DIAGNOSIS — F42.2 MIXED OBSESSIONAL THOUGHTS AND ACTS: ICD-10-CM

## 2020-01-15 NOTE — PSYCH
Psychotherapy Provided: Individual Psychotherapy 50 minutes     Length of time in session: 50 minutes, follow up in 2 week    Goals addressed in session: Goal 1, Goal 2 and Goal 3      Pain:      none    0    Current suicide risk : Low     D: Met with Jenny Hampton with Hashimoto's and officially Type 1  Awaiting results of genetic testing  'My anxiety is so heightened, I don't know if I'm having a heart attack or going to pass out '  'I wasted the majority of my winter break due to just not caring, sleeping  I didn't even get excited about [place] and this is my favorite time of year '  States gets very emotional during New Year's 'I feels it's another year closer to dying '  My moods have been all over the place  I hate I have no control over my emotions  Then I am fighting with Dinorah Vaughn after laughing - I feel like I am not me  It's exhausting for me and Dinorah Vaughn  Unable to find anxiety medication before leaving for vacation - 'I normally feel this bad in public '  Time with Dinorah Vaughn was positive  Had a very long discussion prior to holiday's - how BODØ feels and [employer]  BODØ feels she 'owes him' due to time spent in the hospital and support all the time  Also feels he chooses friends and videos over her (percentage of free time they have)  While on cruise, there was nothing electronic so they spent total 1:1 time together  Slept in the room a lot of the time  Stopped taking Remeron, increased dosage per instructions but 'felt worse that I felt without it'  Also between insurance and the holiday she was unable to call [employer] 'I was also unable to get out of bed due to fatigue and depression '  Stopped 12/27/19 'I tried my absolute best'  Graduates 5/21/20 with [employer]  Goal is to get a better job when she gets better  Dinorah Johana [place] be [place] by end of [place]  They will go to [place] in June to work on an extended [employer]    BODØ states she must loose weight - wants to be 105#  Dr Sesar Lopez she is appropriate weight as long as she doesn't go below 100#  Denied SI    A: Xavi Richey presented with depressed/anxious mood  Afefct was incongruent throughout session  More rapid speech than usual however she was anxious, upset and hasn't been in session due to Insurance changes  P: Continue individual therapy, ongoing support for struggles, monitor symptoms    [address]: Diagnosis and Treatment Plan explained to Karina Tinoco relates understanding diagnosis and is agreeable to Treatment Plan   Yes

## 2020-01-21 ENCOUNTER — OFFICE VISIT (OUTPATIENT)
Dept: INTERNAL MEDICINE CLINIC | Facility: CLINIC | Age: 25
End: 2020-01-21
Payer: COMMERCIAL

## 2020-01-21 VITALS
TEMPERATURE: 98.5 F | DIASTOLIC BLOOD PRESSURE: 80 MMHG | SYSTOLIC BLOOD PRESSURE: 116 MMHG | BODY MASS INDEX: 23.55 KG/M2 | WEIGHT: 128 LBS | HEIGHT: 62 IN | HEART RATE: 93 BPM | RESPIRATION RATE: 16 BRPM | OXYGEN SATURATION: 98 %

## 2020-01-21 DIAGNOSIS — H53.9 VISUAL CHANGES: ICD-10-CM

## 2020-01-21 DIAGNOSIS — E10.65 TYPE 1 DIABETES MELLITUS WITH HYPERGLYCEMIA (HCC): Primary | ICD-10-CM

## 2020-01-21 DIAGNOSIS — F33.1 MODERATE RECURRENT MAJOR DEPRESSION (HCC): ICD-10-CM

## 2020-01-21 PROCEDURE — 3008F BODY MASS INDEX DOCD: CPT | Performed by: NURSE PRACTITIONER

## 2020-01-21 PROCEDURE — 1036F TOBACCO NON-USER: CPT | Performed by: NURSE PRACTITIONER

## 2020-01-21 PROCEDURE — 99214 OFFICE O/P EST MOD 30 MIN: CPT | Performed by: NURSE PRACTITIONER

## 2020-01-21 NOTE — ASSESSMENT & PLAN NOTE
Blood sugars remain out of control, 343 fasting this morning and often gets max meter reading of 600 at home  Current insulin dosing is 1:4 carb ratio though despite taking additional 5+ units, blood sugars do not seem to respond  She was working with Dr Rajendra Hughes but due to insurance change, she is unable to continue with him  She will see  endocrinology today at 1:00  She is also referred for eye exam with ophthalmology due to complaints of vision changes     Lab Results   Component Value Date    HGBA1C 7 8 (H) 12/06/2019

## 2020-01-21 NOTE — ASSESSMENT & PLAN NOTE
Pt under the treatment of psychiatry/psychology, reecently told that she has a mood disorder though not tolerating medication  She continues with management and follow up both with counseling and medication management

## 2020-01-21 NOTE — PROGRESS NOTES
Assessment/Plan:    Type 1 diabetes mellitus with hyperglycemia (HCC)  Blood sugars remain out of control, 343 fasting this morning and often gets max meter reading of 600 at home  Current insulin dosing is 1:4 carb ratio though despite taking additional 5+ units, blood sugars do not seem to respond  She was working with Dr Charles Fischer but due to insurance change, she is unable to continue with him  She will see  endocrinology today at 1:00  She is also referred for eye exam with ophthalmology due to complaints of vision changes  Lab Results   Component Value Date    HGBA1C 7 8 (H) 12/06/2019       Moderate recurrent major depression ([facility] [address] )  Pt under the treatment of psychiatry/psychology, reecently told that she has a mood disorder though not tolerating medication  She continues with management and follow up both with counseling and medication management  Visual changes  Pt in need of diabetic eye exam   Reporting vision changes, concern for retinopathy due to poorly controlled disease  Referral provided, she should schedule with them asap  Diagnoses and all orders for this visit:    Type 1 diabetes mellitus with hyperglycemia ([facility] )  -     Ambulatory referral to Ophthalmology; Future    Visual changes  -     Ambulatory referral to Ophthalmology; Future    Moderate recurrent major depression (HCC)          Subjective:      Patient ID: Junior Byrnes is a 27 y o  female  Pt is a 26 y/o female here for ED f/u  She was seen at [address] [place] [address] ED on 1/18 with hyperglycemia and ketone presence in her urine above baseline  She was treated with IVF and BG decreased to 248 and she was determined to be stable for d/c with f/u advised with her endocrinologist   She was previously by [facility] endocrinology but was not satisfied with her treatment and has been seeing Dr Charles Fischer  He has been working with her for several months with extensive blood work evaluation and adjusting treatment    She was using an insulin pump previously though she has stopped using this due to cost constraints  Unfortunately, her insurance changed at the start of the year and she is no longer able to see Dr Darwin Ferrell  She was referred to  endocrinology by the ER  She says that he recently diagnosed her with Hashimoto's but her levels were not at a point that necessitated initiation of medication  She continues with extremely high blood sugars, she has stopped regularly testing because sugar is "always high "  Fasting blood glucose checked in the office on her meter is 343 today  Her current insulin dosing is a 1:4 carb ratio and she says she is increasing her dose often by at least 5 units because she notes that her sugar does not normalize despite that ratio  She continues with fatigue and weakness  She states she gets dizziness, headache, chest pain, shortness of breath when sugars go up extremely high, not symptomatic today at visit  She has noted problems with her vision recently as well, hair loss, weight loss, extreme thirst, polyuria, appetite loss  Mood has been uncontrolled, she sees both psychiatry and pshycologist and was told she has a mood disorder, not tolerating medication  The following portions of the patient's history were reviewed and updated as appropriate: allergies, current medications, past family history, past medical history, past social history, past surgical history and problem list     Review of Systems   Constitutional: Positive for appetite change, fatigue and unexpected weight change (weight loss despite decreased po intake)  Eyes: Positive for visual disturbance  Respiratory: Negative for shortness of breath  Cardiovascular: Negative for chest pain and palpitations  Endocrine: Positive for polydipsia and polyuria  Genitourinary: Positive for menstrual problem  Neurological: Positive for weakness and numbness (occasional bilater hand numbness)  Negative for dizziness and headaches  Objective:      /80   Pulse 93   Temp 98 5 °F (36 9 °C)   Resp 16   Ht 5' 2" (1 575 m)   Wt 58 1 kg (128 lb)   SpO2 98%   BMI 23 41 kg/m²          Physical Exam   Constitutional: She is oriented to person, place, and time  Vital signs are normal  She appears well-developed and well-nourished  She is cooperative  Neck: No JVD present  Carotid bruit is not present  Cardiovascular: Normal rate, regular rhythm, normal heart sounds and normal pulses  No murmur heard  Pulmonary/Chest: Effort normal and breath sounds normal    Abdominal: Soft  Normal appearance and bowel sounds are normal    Musculoskeletal: Normal range of motion  Neurological: She is alert and oriented to person, place, and time  She has normal strength  Skin: Skin is warm, dry and intact  Psychiatric: Her speech is normal and behavior is normal  Judgment and thought content normal  Cognition and memory are normal  She exhibits a depressed mood  Vitals reviewed

## 2020-01-21 NOTE — ASSESSMENT & PLAN NOTE
Pt in need of diabetic eye exam   Reporting vision changes, concern for retinopathy due to poorly controlled disease  Referral provided, she should schedule with them asap

## 2020-01-22 ENCOUNTER — SOCIAL WORK (OUTPATIENT)
Dept: BEHAVIORAL/MENTAL HEALTH CLINIC | Facility: CLINIC | Age: 25
End: 2020-01-22
Payer: COMMERCIAL

## 2020-01-22 DIAGNOSIS — F33.1 MODERATE RECURRENT MAJOR DEPRESSION (HCC): ICD-10-CM

## 2020-01-22 DIAGNOSIS — F42.2 MIXED OBSESSIONAL THOUGHTS AND ACTS: ICD-10-CM

## 2020-01-22 DIAGNOSIS — F41.1 GAD (GENERALIZED ANXIETY DISORDER): ICD-10-CM

## 2020-01-22 DIAGNOSIS — F43.12 CHRONIC POST-TRAUMATIC STRESS DISORDER (PTSD): Primary | ICD-10-CM

## 2020-01-22 PROCEDURE — 90834 PSYTX W PT 45 MINUTES: CPT | Performed by: SOCIAL WORKER
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
## 2020-01-27 ENCOUNTER — DOCUMENTATION (OUTPATIENT)
Dept: PSYCHIATRY | Facility: CLINIC | Age: 25
End: 2020-01-27

## 2020-01-29 ENCOUNTER — SOCIAL WORK (OUTPATIENT)
Dept: BEHAVIORAL/MENTAL HEALTH CLINIC | Facility: CLINIC | Age: 25
End: 2020-01-29
Payer: COMMERCIAL

## 2020-01-29 DIAGNOSIS — F33.1 MODERATE RECURRENT MAJOR DEPRESSION (HCC): ICD-10-CM

## 2020-01-29 DIAGNOSIS — F42.2 MIXED OBSESSIONAL THOUGHTS AND ACTS: ICD-10-CM

## 2020-01-29 DIAGNOSIS — F41.1 GAD (GENERALIZED ANXIETY DISORDER): Primary | ICD-10-CM

## 2020-01-29 DIAGNOSIS — F43.12 CHRONIC POST-TRAUMATIC STRESS DISORDER (PTSD): ICD-10-CM

## 2020-01-29 PROCEDURE — 90834 PSYTX W PT 45 MINUTES: CPT | Performed by: SOCIAL WORKER

## 2020-01-29 NOTE — PSYCH
Psychotherapy Provided: Individual Psychotherapy 50 minutes     Length of time in session: 50 minutes, follow up in 1 week    Goals addressed in session: Goal 1, Goal 2 and Goal 3      Pain:      none    0    Current suicide risk : Low     D: Met with Jenny individually  Session focused upon feeling tired though BODØ has begun to find 10 hrs of sleep is good for her  Sugars remain high and recent sharp kidney pain has developed  Thyroid is inflamed - raspy voice  BODØ feels she may have a mood disorder - "I am obnoxious and out of control sometimes '  BODØ acknowledged lack of control is so many areas triggers anxiety and depression  Validation provided  Sitting in the warm shower crying has been helpful 'I get it all out'  'This disease has taken so much from me '  Attending classes though missed a few due to illness  2 classes are what BODØ withdrew from so she has not fallen behind  Trip from [place] may be banned due to Virus  Denied SI    A: BODØ continues with depressed lethargic with dysphoria  Multiple medical stressors and fears of DKA  P: Continue individual therapy, support for stressors with validation of current space she is in  Behavioral Health Treatment Plan [facility]: Diagnosis and Treatment Plan explained to Franchesca Stover relates understanding diagnosis and is agreeable to Treatment Plan   Yes

## 2020-02-12 ENCOUNTER — SOCIAL WORK (OUTPATIENT)
Dept: BEHAVIORAL/MENTAL HEALTH CLINIC | Facility: CLINIC | Age: 25
End: 2020-02-12
Payer: COMMERCIAL

## 2020-02-12 DIAGNOSIS — F41.1 GAD (GENERALIZED ANXIETY DISORDER): ICD-10-CM

## 2020-02-12 DIAGNOSIS — F33.1 MODERATE RECURRENT MAJOR DEPRESSION (HCC): ICD-10-CM

## 2020-02-12 DIAGNOSIS — F42.2 MIXED OBSESSIONAL THOUGHTS AND ACTS: ICD-10-CM

## 2020-02-12 DIAGNOSIS — F43.12 CHRONIC POST-TRAUMATIC STRESS DISORDER (PTSD): Primary | ICD-10-CM

## 2020-02-12 PROCEDURE — 90834 PSYTX W PT 45 MINUTES: CPT | Performed by: SOCIAL WORKER
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
## 2020-02-19 ENCOUNTER — SOCIAL WORK (OUTPATIENT)
Dept: BEHAVIORAL/MENTAL HEALTH CLINIC | Facility: CLINIC | Age: 25
End: 2020-02-19
Payer: COMMERCIAL

## 2020-02-19 DIAGNOSIS — F43.12 CHRONIC POST-TRAUMATIC STRESS DISORDER (PTSD): Primary | ICD-10-CM

## 2020-02-19 DIAGNOSIS — F33.1 MODERATE RECURRENT MAJOR DEPRESSION (HCC): ICD-10-CM

## 2020-02-19 DIAGNOSIS — F42.2 MIXED OBSESSIONAL THOUGHTS AND ACTS: ICD-10-CM

## 2020-02-19 DIAGNOSIS — F41.1 GAD (GENERALIZED ANXIETY DISORDER): ICD-10-CM

## 2020-02-19 DIAGNOSIS — Z30.41 ORAL CONTRACEPTIVE PILL SURVEILLANCE: ICD-10-CM

## 2020-02-19 PROCEDURE — 90834 PSYTX W PT 45 MINUTES: CPT | Performed by: SOCIAL WORKER

## 2020-02-19 RX ORDER — LEVONORGESTREL AND ETHINYL ESTRADIOL 0.15-0.03
KIT ORAL
Qty: 84 TABLET | Refills: 0 | Status: SHIPPED | OUTPATIENT
Start: 2020-02-19 | End: 2020-04-07 | Stop reason: SDUPTHER
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
## 2020-02-21 ENCOUNTER — OFFICE VISIT (OUTPATIENT)
Dept: INTERNAL MEDICINE CLINIC | Facility: CLINIC | Age: 25
End: 2020-02-21
Payer: COMMERCIAL

## 2020-02-21 VITALS
TEMPERATURE: 99.2 F | BODY MASS INDEX: 24.48 KG/M2 | HEIGHT: 62 IN | HEART RATE: 86 BPM | SYSTOLIC BLOOD PRESSURE: 102 MMHG | OXYGEN SATURATION: 98 % | DIASTOLIC BLOOD PRESSURE: 64 MMHG | WEIGHT: 133 LBS | RESPIRATION RATE: 16 BRPM

## 2020-02-21 DIAGNOSIS — E10.65 TYPE 1 DIABETES MELLITUS WITH HYPERGLYCEMIA (HCC): Primary | ICD-10-CM

## 2020-02-21 DIAGNOSIS — R30.0 DYSURIA: ICD-10-CM

## 2020-02-21 DIAGNOSIS — R10.9 FLANK PAIN: ICD-10-CM

## 2020-02-21 DIAGNOSIS — F33.1 MODERATE RECURRENT MAJOR DEPRESSION (HCC): ICD-10-CM

## 2020-02-21 DIAGNOSIS — H53.9 VISUAL CHANGES: ICD-10-CM

## 2020-02-21 DIAGNOSIS — N91.2 AMENORRHEA: ICD-10-CM

## 2020-02-21 PROBLEM — E06.3 HASHIMOTO'S DISEASE: Status: ACTIVE | Noted: 2020-02-21

## 2020-02-21 LAB
SL AMB  POCT GLUCOSE, UA: NORMAL
SL AMB LEUKOCYTE ESTERASE,UA: NORMAL
SL AMB POCT BILIRUBIN,UA: NORMAL
SL AMB POCT BLOOD,UA: NORMAL
SL AMB POCT CLARITY,UA: NORMAL
SL AMB POCT COLOR,UA: YELLOW
SL AMB POCT KETONES,UA: NORMAL
SL AMB POCT NITRITE,UA: NORMAL
SL AMB POCT PH,UA: 5
SL AMB POCT SPECIFIC GRAVITY,UA: 1.02
SL AMB POCT URINE PROTEIN: NORMAL
SL AMB POCT UROBILINOGEN: NORMAL

## 2020-02-21 PROCEDURE — 3008F BODY MASS INDEX DOCD: CPT | Performed by: NURSE PRACTITIONER

## 2020-02-21 PROCEDURE — 81002 URINALYSIS NONAUTO W/O SCOPE: CPT | Performed by: NURSE PRACTITIONER

## 2020-02-21 PROCEDURE — 2022F DILAT RTA XM EVC RTNOPTHY: CPT | Performed by: NURSE PRACTITIONER

## 2020-02-21 PROCEDURE — 1036F TOBACCO NON-USER: CPT | Performed by: NURSE PRACTITIONER

## 2020-02-21 PROCEDURE — 99214 OFFICE O/P EST MOD 30 MIN: CPT | Performed by: NURSE PRACTITIONER

## 2020-02-21 PROCEDURE — 3061F NEG MICROALBUMINURIA REV: CPT | Performed by: NURSE PRACTITIONER

## 2020-02-21 NOTE — ASSESSMENT & PLAN NOTE
R flank pain intermittently x 1 month, episode witness in office  Not triggered by anything, pt seated during episode, lasted about 1-2 minutes  Urine dip unremarkable  Pt was resistant to any testing and I told her that monitoring over time was an option but without knowing the cause of her symptoms I can not comfortably weigh the risks of watchful waiting  She would be willing to do an US and I did order this  She should call if sx worsen

## 2020-02-21 NOTE — ASSESSMENT & PLAN NOTE
Pt is depressed, largely due to deteriorating health  Has tried numerous medications without improvement and with poor tolerance  Currently working with psychiatry  Most recently taking remeron though she stopped this because she states it was not helping and she felt out of it and drugged  She states she does not want to take any more medication  She will continue to f/u with psych though

## 2020-02-21 NOTE — ASSESSMENT & PLAN NOTE
Pt was unable to see the ophthalmologist I referred her to because of insurance  She made an appointment at [facility], though I asked her to check if this is an ophthalmologist because she needs more than a basic vision screening  She will check and if not, she should call her insurance to find out who she can see  Due to her complaints of declining vision as well as her numerous other medical problems including amenorrhea, poorly controlled diabetes, thyroid dysfunction, fatigue, I am ordering an MRI of the brain  Would like to r/o pituitary pathology

## 2020-02-21 NOTE — PROGRESS NOTES
Assessment/Plan:    Type 1 diabetes mellitus with hyperglycemia (HCC)  Blood sugars remain elevated but per patient they have been stabilizing around 300  She is now under care of  endocrinology and her current insulin regimen was reviewed  Carb to insulin ratio is 4:1, 35 units of tresiba daily  Continue with treatment as prescribed and f/u with them as directed  Lab Results   Component Value Date    HGBA1C 7 8 (H) 12/06/2019       Moderate recurrent major depression ([address] )  Pt is depressed, largely due to deteriorating health  Has tried numerous medications without improvement and with poor tolerance  Currently working with psychiatry  Most recently taking remeron though she stopped this because she states it was not helping and she felt out of it and drugged  She states she does not want to take any more medication  She will continue to f/u with psych though  Visual changes  Pt was unable to see the ophthalmologist I referred her to because of insurance  She made an appointment at [facility], though I asked her to check if this is an ophthalmologist because she needs more than a basic vision screening  She will check and if not, she should call her insurance to find out who she can see  Due to her complaints of declining vision as well as her numerous other medical problems including amenorrhea, poorly controlled diabetes, thyroid dysfunction, fatigue, I am ordering an MRI of the brain  Would like to r/o pituitary pathology  Flank pain  R flank pain intermittently x 1 month, episode witness in office  Not triggered by anything, pt seated during episode, lasted about 1-2 minutes  Urine dip unremarkable  Pt was resistant to any testing and I told her that monitoring over time was an option but without knowing the cause of her symptoms I can not comfortably weigh the risks of watchful waiting  She would be willing to do an US and I did order this   She should call if sx worsen  Dysuria  Urine dip unremarkable, see discussion of flank pain below  Amenorrhea  Pt has not had her period in 5 months  Pregnancy ruled out  She does not see her gyn until April for annual   I told her to call there and notify her provider of her symptoms to see if they want to bring her in for a visit to discuss that in advance  As discussed, in combination with her other symptoms of vision changes, poorly controlled diabetes, fatigue, headaches, etc, I am also getting a brain MRI for additional evaluation  Diagnoses and all orders for this visit:    Type 1 diabetes mellitus with hyperglycemia (HCC)  -     insulin degludec Graydon Kale FlexTouch) 100 units/mL injection pen; Inject 35 Units under the skin daily at bedtime 8 units daily    Dysuria  -     POCT urine dip  -     Cancel: UA w Reflex to Microscopic w Reflex to Culture    Flank pain  -     US retroperitoneal complete; Future    Visual changes  -     MRI brain w wo contrast; Future    Moderate recurrent major depression (HCC)    Amenorrhea          Subjective:      Patient ID: Katarzyna Conn is a 23 y o  female  Pt is a 22 y/o female here today for f/u  PMH of poorly controlled DM I, hashimoto's, depression, PTSD, GD, OCD  Pt is becoming increasingly depressed and tired due to her deteriorating health  When she first came to my office 12/18, she had a variety of concerns including frequent flu-like sympotms, fatigue, abdominal pain, abnormal stools, headaches  She was eventually diagnosed with type 1 diabetes which has proven to be extremely difficult to control with poor and unpredictable response to insulin  She has extensive testing and evaluation with endocrinology and has continued to struggle with variable blood sugars despite adherence to dietary recommendations  She states today that sugars are "better" lately, have been in the 300's  We reviewed her current insulin regimen    At her last visit in January, she reported vision changes with vision worsening in recent months, with decreased acuity as well as decreased peripheral vision, halo effect when driving at night  Vision has become so bad that she has stopped driving  She also reports that she has not had her period in 5 months  She had discussed this previously with Dr Chang Nunez, she states he was concerned but this was when her insurance changed and he never evaluated this  Her current endocrinologist reportedly told her to keep monitoring this  She is taking hormonal contraception, altavera  She now reports that since mid January, she has been experiencing sharp, stabbing pains in her R flank  Pain comes on suddenly, not triggered by any predictable event, lasts moments and resolves  She has been having frequency and dysuria as well  She denies fever/chills  Her mood has been depressed, now also having panic attacks  When she gets panic attacks she gets chest pain, palpitations, and shortness of breath  She has stopped taking medications because they are not helping her and they make her feel too drugged and "out of it "  She continues with fatigue, headaches, appetite loss  The following portions of the patient's history were reviewed and updated as appropriate: allergies, current medications, past family history, past medical history, past social history, past surgical history and problem list     Review of Systems   Constitutional: Positive for fatigue  Negative for activity change, appetite change, chills, fever and unexpected weight change  Eyes: Positive for visual disturbance  Negative for photophobia, pain and redness  Respiratory: Negative for cough, chest tightness and shortness of breath  Cardiovascular: Negative for chest pain, palpitations and leg swelling  Gastrointestinal: Negative for abdominal distention, abdominal pain, nausea and vomiting  Genitourinary: Positive for dysuria, flank pain, frequency and menstrual problem  Negative for decreased urine volume and pelvic pain  Musculoskeletal: Positive for arthralgias  Negative for myalgias  Neurological: Positive for headaches  Negative for speech difficulty, weakness and numbness  Psychiatric/Behavioral: Positive for dysphoric mood  Negative for sleep disturbance and suicidal ideas  The patient is nervous/anxious  Objective:      /64   Pulse 86   Temp 99 2 °F (37 3 °C)   Resp 16   Ht 5' 2" (1 575 m)   Wt 60 3 kg (133 lb)   SpO2 98%   BMI 24 33 kg/m²          Physical Exam   Constitutional: She is oriented to person, place, and time  Vital signs are normal  She appears well-developed and well-nourished  She is cooperative  Eyes: Conjunctivae and lids are normal    Neck: Normal carotid pulses and no JVD present  Carotid bruit is not present  Cardiovascular: Normal rate, regular rhythm, normal heart sounds and normal pulses  No murmur heard  Pulmonary/Chest: Effort normal and breath sounds normal    Abdominal: Soft  Normal appearance and bowel sounds are normal  There is no tenderness  There is CVA tenderness (R flank)  Witnessed episode of R flank pain which nearly brought pt to tears, non-tender to palpation of the R flank during episode  Musculoskeletal: Normal range of motion  Neurological: She is alert and oriented to person, place, and time  Skin: Skin is warm, dry and intact  Psychiatric: Her speech is normal and behavior is normal  Judgment and thought content normal  Cognition and memory are normal  She exhibits a depressed mood  Vitals reviewed

## 2020-02-21 NOTE — ASSESSMENT & PLAN NOTE
Blood sugars remain elevated but per patient they have been stabilizing around 300  She is now under care of  endocrinology and her current insulin regimen was reviewed  Carb to insulin ratio is 4:1, 35 units of tresiba daily  Continue with treatment as prescribed and f/u with them as directed     Lab Results   Component Value Date    HGBA1C 7 8 (H) 12/06/2019

## 2020-02-21 NOTE — ASSESSMENT & PLAN NOTE
Pt has not had her period in 5 months  Pregnancy ruled out  She does not see her gyn until April for annual   I told her to call there and notify her provider of her symptoms to see if they want to bring her in for a visit to discuss that in advance  As discussed, in combination with her other symptoms of vision changes, poorly controlled diabetes, fatigue, headaches, etc, I am also getting a brain MRI for additional evaluation

## 2020-02-26 ENCOUNTER — TELEPHONE (OUTPATIENT)
Dept: OBGYN CLINIC | Facility: CLINIC | Age: 25
End: 2020-02-26

## 2020-02-26 ENCOUNTER — SOCIAL WORK (OUTPATIENT)
Dept: BEHAVIORAL/MENTAL HEALTH CLINIC | Facility: CLINIC | Age: 25
End: 2020-02-26
Payer: COMMERCIAL

## 2020-02-26 DIAGNOSIS — F33.1 MODERATE RECURRENT MAJOR DEPRESSION (HCC): ICD-10-CM

## 2020-02-26 DIAGNOSIS — F43.12 CHRONIC POST-TRAUMATIC STRESS DISORDER (PTSD): Primary | ICD-10-CM

## 2020-02-26 DIAGNOSIS — F41.1 GAD (GENERALIZED ANXIETY DISORDER): ICD-10-CM

## 2020-02-26 DIAGNOSIS — F42.2 MIXED OBSESSIONAL THOUGHTS AND ACTS: ICD-10-CM

## 2020-02-26 PROCEDURE — 90834 PSYTX W PT 45 MINUTES: CPT | Performed by: SOCIAL WORKER
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
## 2020-02-26 NOTE — TELEPHONE ENCOUNTER
Pt contacted and advised [facility] located in [place] does not populate as having a pharmacy  Pt [facility] to [facility]  I changed pharmacy on file for pt

## 2020-02-27 ENCOUNTER — OFFICE VISIT (OUTPATIENT)
Dept: PSYCHIATRY | Facility: CLINIC | Age: 25
End: 2020-02-27
Payer: COMMERCIAL

## 2020-02-27 VITALS — DIASTOLIC BLOOD PRESSURE: 67 MMHG | HEART RATE: 77 BPM | SYSTOLIC BLOOD PRESSURE: 102 MMHG

## 2020-02-27 DIAGNOSIS — F41.1 GAD (GENERALIZED ANXIETY DISORDER): ICD-10-CM

## 2020-02-27 DIAGNOSIS — F43.12 CHRONIC POST-TRAUMATIC STRESS DISORDER (PTSD): ICD-10-CM

## 2020-02-27 DIAGNOSIS — F33.1 MODERATE RECURRENT MAJOR DEPRESSION (HCC): Primary | ICD-10-CM

## 2020-02-27 PROCEDURE — 99213 OFFICE O/P EST LOW 20 MIN: CPT | Performed by: PHYSICIAN ASSISTANT

## 2020-02-27 RX ORDER — LORAZEPAM 0.5 MG/1
0.5 TABLET ORAL 2 TIMES DAILY PRN
Qty: 14 TABLET | Refills: 1 | Status: SHIPPED | OUTPATIENT
Start: 2020-02-27 | End: 2020-08-07 | Stop reason: ALTCHOICE

## 2020-02-27 RX ORDER — CHOLECALCIFEROL (VITAMIN D3) 1250 MCG
50000 CAPSULE ORAL WEEKLY
COMMUNITY
End: 2021-11-02

## 2020-02-27 RX ORDER — VILAZODONE HYDROCHLORIDE 10 MG/1
10 TABLET ORAL
Qty: 30 TABLET | Refills: 2 | Status: SHIPPED | OUTPATIENT
Start: 2020-02-27 | End: 2020-06-19 | Stop reason: ALTCHOICE

## 2020-02-27 NOTE — PSYCH
MEDICATION MANAGEMENT NOTE        [facility]      Name and Date of Birth:  Maryjane Mitchell 23 y o  1995 MRN: [id number]    Date of Visit: February 27, 2020    SUBJECTIVE:    VAL is seen today for a follow up for Major Depressive Disorder, Generalized Anxiety Disorder and PTSD  Today reports that she has been continuing to struggle with her depression and anxiety symptoms as well as PTSD symptoms  She does report however without notifying provider that she did stop her Remeron around [place] time  She reports she felt too sedated on it and was sleeping way more than she normally does and did not want to continue it  Discussed with patient at length about letting provider now before stopping medication  She reports in the last couple months she has felt more depressed mood and more tearful at times  Admits to low energy but also reports her diabetes mellitus type 1 has been out of control and when her sugars are out of control she has low energy  However, she does report she feels overall her sleep has been normal lately  She also feels her appetite has been normal but she had about 2 meals per day  Admits that at school her focus can be decreased and she reports with her high anxiety she is being triggered especially while taking test   She reports she now 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 as she reports she now has panic attacks onset by taking tests in school  She reports she feels her last test could she had about panic attack  She admits to having 3 panic attacks per week now with dizziness, shortness of breath, losing sense of reality, feeling muscles tense  Admits to constant worry, irritability  Also admits to PTSD symptoms including being triggered by male figures or yelling    However, she does report overall she is still doing pretty well just sitting in class when she is not taking a test   She admits she is still taking Ativan but very infrequently  Did discuss taking Ativan for the onset of panic attacks to help with that  Denies SI/HI          HPI ROS:             ('was' notes: recent => remote)  Medication Side Effects:  denies taking psychiatric medication currently  sedation, psychomotor retardation with Remeron   Depression (10 worst): increased (Was high)   Anxiety (10 worst): increased (Was high)   Safety concerns (SI, HI, etc): denies (Was denied)   Sleep: normal (Was  Increased, 14 hrs)   Energy: low (Was low)   Appetite: Normal, but thinks she is eating less (Was normal)   Weight Change: wght gain? denied       Review Of Systems:      Constitutional feeling tired and low energy   Cardiovascular negative   Respiratory negative   Gastrointestinal negative   Musculoskeletal negative   Neurological negative   Endocrine negative       The following portions of the patient's history were reviewed and updated as appropriate: past family history, past medical history, past social history, past surgical history and problem list     Past Psychiatric History:     Past Psychiatric Medication Trials: Prozac, Zoloft- both caused increased depression and SI, Effexor- trial for a few wks- severe GI intolerance, Cymbalta- severe GI intolerance, Wellbutrin- no help over about 6 month period, Remeron- about 1 month, severe sedation "felt out of it " and Ativan      Past Medical History:    Past Medical History:   Diagnosis Date    Abdominal pain     Anxiety     Anxiety and depression     Depression     Diabetes [facility] )     type 1    Diabetes mellitus ([facility] ) 1/17/2019    Eating disorder     history of anorexia/bulemia [id number]    Fracture of fibula     R Salter I    Hashimoto's disease 2/21/2020    Nasal congestion     Obsessive-compulsive disorder     PTSD (post-traumatic stress disorder)     Rectal bleed      Past Medical History Pertinent Negatives:   Diagnosis Date Noted    Allergic 12/13/2018    Allergic rhinitis 06/03/2019    Anemia 12/13/2018    history of anemia in childhood    Asthma 06/12/2018    Chronic kidney disease 12/13/2018    [facility] 12/13/2018    Coronary artery disease 12/13/2018    Diverticulitis of colon 12/13/2018    GERD (gastroesophageal reflux disease) 12/13/2018    Heart murmur 12/13/2018    HL (hearing loss) 12/13/2018    Hypertension 12/13/2018    Infectious viral hepatitis 12/13/2018    Inflammatory bowel disease 12/13/2018    Kidney stone 12/13/2018    Memory loss 12/13/2018    Migraine 05/03/2019    Myocardial infarction [facility] ) 12/13/2018    Otitis media 12/13/2018    Pneumonia 12/13/2018    Renal disorder 04/25/2016    Scoliosis 12/13/2018    Seizures ([facility] ) 12/13/2018    Sleep apnea 05/03/2019    Sleep difficulties 05/03/2019    Speech impairment 05/03/2019    Stroke ([facility] ) 12/13/2018    Substance abuse ([facility] ) 12/13/2018    Suicide attempt ([facility] ) 10/25/2019    Tinnitus 05/03/2019    Urinary tract infection 12/13/2018    Visual impairment 12/13/2018     Past Surgical History:   Procedure Laterality Date    NASAL SEPTOPLASTY W/ TURBINOPLASTY      WISDOM TOOTH EXTRACTION      WRIST SURGERY      left; Excision of ganglion     Allergies   Allergen Reactions    Insulin Glargine     Iodides Throat Swelling     Reaction Date: 28Jul2014; Action Taken: none; Category: Allergy;     Iodine        Substance Abuse History:    Social History     Substance and Sexual Activity   Alcohol Use Yes    Frequency: Monthly or less    Drinks per session: 1 or 2    Binge frequency: Never    Comment: socially       Social History     Substance and Sexual Activity   Drug Use No       Social History:    Social History     Socioeconomic History    Marital status: Single     Spouse name: Not on file    Number of children: Not on file    Years of education: Not on file    Highest education level: Not on file   Occupational History  Not on file   Social Needs    Financial resource strain: Not on file    Food insecurity:     Worry: Not on file     Inability: Not on file    Transportation needs:     Medical: Not on file     Non-medical: Not on file   Tobacco Use    Smoking status: Never Smoker    Smokeless tobacco: Never Used    Tobacco comment: Tobacco smoke exposure (Father smokes cigars)   Substance and Sexual Activity    Alcohol use: Yes     Frequency: Monthly or less     Drinks per session: 1 or 2     Binge frequency: Never     Comment: socially      Drug use: No    Sexual activity: Yes     Partners: Male     Birth control/protection: Condom Male, OCP   Lifestyle    Physical activity:     Days per week: Not on file     Minutes per session: Not on file    Stress: Not on file   Relationships    Social connections:     Talks on phone: Not on file     Gets together: Not on file     Attends Druze service: Not on file     Active member of club or organization: Not on file     Attends meetings of clubs or organizations: Not on file     Relationship status: Not on file    Intimate partner violence:     Fear of current or ex partner: Not on file     Emotionally abused: Not on file     Physically abused: Not on file     Forced sexual activity: Not on file   Other Topics Concern    Not on file   Social History Narrative    Student at [employer]    Reports a poor diet; low in vegetables, high in sweets    Dental care, regularly    Lives with parents    Sleeps 8-10 hours a day       Family Psychiatric History:     Family History   Problem Relation Age of Onset    Hypertension Mother     Migraines Mother         Headache    Diabetes type II Mother     Varicose Veins Mother     Hyperlipidemia Mother     Diabetes Mother     Arthritis Mother     Depression Mother     Cholelithiasis Father     Hypertension Father     Sarcoidosis Father         Liver    Hyperlipidemia Father     Diabetes Father     Coronary artery disease Father  Nephrolithiasis Father     Cirrhosis Father     Alcohol abuse Father     Thyroid disease Sister     Cancer Family     Diabetes Family     Hypertension Family                 OBJECTIVE:     Vital signs in last 24 hours:    Vitals:    02/27/20 1510   BP: 102/67   BP Location: Left arm   Patient Position: Sitting   Cuff Size: Standard   Pulse: 77       Mental Status Evaluation:    Appearance age appropriate, casually dressed   Behavior calm, guarded, good eye contact   Speech decreased rate, soft   Mood depressed, anxious   Affect mood-congruent   Thought Processes organized, goal directed   Associations intact associations   Thought Content no overt delusions, negative thinking   Perceptual Disturbances: no auditory hallucinations, no visual hallucinations   Abnormal Thoughts  Risk Potential Suicidal ideation - None  Homicidal ideation - None  Potential for aggression - No   Orientation oriented to person, place, time/date and situation   Memory recent and remote memory grossly intact   Consciousness alert and awake   Attention Span Concentration Span attention span and concentration are age appropriate   Intellect appears to be of average intelligence   Insight intact   Judgement moderate   Muscle Strength and  Gait normal muscle strength and normal muscle tone, normal gait and normal balance   Motor activity no abnormal movements   Language no difficulty naming common objects, no difficulty repeating a phrase, no difficulty writing a sentence   Fund of Knowledge adequate knowledge of current events  adequate fund of knowledge regarding past history  adequate fund of knowledge regarding vocabulary    Pain none   Pain Scale 0       Laboratory Results:   Recent Labs (last 6 months):   Office Visit on 02/21/2020   Component Date Value    LEUKOCYTE ESTERASE,UA 02/21/2020 neg     NITRITE,UA 02/21/2020 neg     SL AMB POCT UROBILINOGEN 02/21/2020 neg     POCT URINE PROTEIN 02/21/2020 trace      PH,UA 02/21/2020 5     BLOOD,UA 02/21/2020 neg     SPECIFIC GRAVITY,UA 02/21/2020 1 025     KETONES,UA 02/21/2020 trace     BILIRUBIN,UA 02/21/2020 neg     GLUCOSE, UA 02/21/2020 neg      COLOR,UA 02/21/2020 yellow     CLARITY,UA 02/21/2020 transparent    Appointment on 12/06/2019   Component Date Value    Amylase 12/06/2019 41     Lipase 12/06/2019 95     Sodium 12/06/2019 137     Potassium 12/06/2019 3 8     Chloride 12/06/2019 100     CO2 12/06/2019 28     ANION GAP 12/06/2019 9     BUN 12/06/2019 14     Creatinine 12/06/2019 0 95     Glucose, Fasting 12/06/2019 229*    Calcium 12/06/2019 9 4     AST 12/06/2019 23     ALT 12/06/2019 35     Alkaline Phosphatase 12/06/2019 91     Total Protein 12/06/2019 8 3*    Albumin 12/06/2019 4 1     Total Bilirubin 12/06/2019 1 32*    eGFR 12/06/2019 85     WBC 12/06/2019 6 65     RBC 12/06/2019 5 03     Hemoglobin 12/06/2019 14 6     Hematocrit 12/06/2019 43 6     MCV 12/06/2019 87     MCH 12/06/2019 29 0     MCHC 12/06/2019 33 5     RDW 12/06/2019 12 0     MPV 12/06/2019 10 9     Platelets 84/74/9921 238     nRBC 12/06/2019 0     Neutrophils Relative 12/06/2019 43     Immat GRANS % 12/06/2019 1     Lymphocytes Relative 12/06/2019 45*    Monocytes Relative 12/06/2019 8     Eosinophils Relative 12/06/2019 2     Basophils Relative 12/06/2019 1     Neutrophils Absolute 12/06/2019 3 02     Immature Grans Absolute 12/06/2019 0 03     Lymphocytes Absolute 12/06/2019 2 97     Monocytes Absolute 12/06/2019 0 50     Eosinophils Absolute 12/06/2019 0 10     Basophils Absolute 12/06/2019 0 03     TSH 3RD GENERATON 12/06/2019 2 407     Free T4 12/06/2019 0 98     Hemoglobin A1C 12/06/2019 7 8*    EAG 12/06/2019 177     Glutaminc Acid Decarboxy* 12/06/2019 369 3*    Miscellaneous Lab Test R* 12/06/2019 SEE WRITTEN REPORT     THYROID MICROSOMAL ANTIB* 12/06/2019 85*    Thyroglobulin Ab 12/06/2019 8 0*   Transcribe Orders on 12/06/2019 Component Date Value    Color, UA 12/06/2019 Yellow     Clarity, UA 12/06/2019 Clear     Specific Gravity, UA 12/06/2019 1 020     pH, UA 12/06/2019 6 0     Leukocytes, UA 12/06/2019 Negative     Nitrite, UA 12/06/2019 Negative     Protein, UA 12/06/2019 Negative     Glucose, UA 12/06/2019 Negative     Ketones, UA 12/06/2019 Negative     Urobilinogen, UA 12/06/2019 0 2     Bilirubin, UA 12/06/2019 Negative     Blood, UA 12/06/2019 Negative    Admission on 10/22/2019, Discharged on 10/22/2019   Component Date Value    POC Glucose 10/22/2019 419*    WBC 10/22/2019 7 29     RBC 10/22/2019 5 15*    Hemoglobin 10/22/2019 14 9     Hematocrit 10/22/2019 43 3     MCV 10/22/2019 84     MCH 10/22/2019 28 9     MCHC 10/22/2019 34 4     RDW 10/22/2019 11 8     MPV 10/22/2019 11 8     Platelets 83/81/5284 190     nRBC 10/22/2019 0     Neutrophils Relative 10/22/2019 47     Immat GRANS % 10/22/2019 0     Lymphocytes Relative 10/22/2019 44     Monocytes Relative 10/22/2019 8     Eosinophils Relative 10/22/2019 1     Basophils Relative 10/22/2019 0     Neutrophils Absolute 10/22/2019 3 39     Immature Grans Absolute 10/22/2019 0 02     Lymphocytes Absolute 10/22/2019 3 20     Monocytes Absolute 10/22/2019 0 56     Eosinophils Absolute 10/22/2019 0 09     Basophils Absolute 10/22/2019 0 03     Protime 10/22/2019 12 6     INR 10/22/2019 1 00     PTT 10/22/2019 25     Blood Culture 10/22/2019 No Growth After 5 Days   Blood Culture 10/22/2019 No Growth After 5 Days       Sodium 10/22/2019 133*    Potassium 10/22/2019 3 8     Chloride 10/22/2019 97*    CO2 10/22/2019 22     ANION GAP 10/22/2019 14*    BUN 10/22/2019 10     Creatinine 10/22/2019 0 81     Glucose 10/22/2019 353*    Calcium 10/22/2019 9 5     AST 10/22/2019 14     ALT 10/22/2019 20     Alkaline Phosphatase 10/22/2019 91     Total Protein 10/22/2019 8 5*    Albumin 10/22/2019 4 3     Total Bilirubin 10/22/2019 1 10*    eGFR 10/22/2019 103     Lipase 10/22/2019 156     LACTIC ACID 10/22/2019 2 1*    Preg, Serum 10/22/2019 Negative     EXT PREG TEST UR (Ref: N* 10/22/2019 Negative     Control 10/22/2019 Valid     BETA-HYDROXYBUTYRATE 10/22/2019 0 4     pH, Dariel 10/22/2019 7 394     pCO2, Dariel 10/22/2019 37 0*    pO2, Dariel 10/22/2019 43 3     HCO3, Dariel 10/22/2019 22 1*    Base Excess, Dariel 10/22/2019 -2 3     O2 Content, Dariel 10/22/2019 17 2     O2 HGB, VENOUS 10/22/2019 79 8     Color, UA 10/22/2019 Yellow     Clarity, UA 10/22/2019 Clear     pH, UA 10/22/2019 5 5     Leukocytes, UA 10/22/2019 Negative     Nitrite, UA 10/22/2019 Negative     Protein, UA 10/22/2019 Negative     Glucose, UA 10/22/2019 500 (1/2%)*    Ketones, UA 10/22/2019 15 (1+)*    Urobilinogen, UA 10/22/2019 0 2     Bilirubin, UA 10/22/2019 Negative     Blood, UA 10/22/2019 Negative     Specific Gravity, UA 10/22/2019 1 020     LACTIC ACID 10/22/2019 1 8     POC Glucose 10/22/2019 194*    Ventricular Rate 10/22/2019 76     Atrial Rate 10/22/2019 76     CO Interval 10/22/2019 142     QRSD Interval 10/22/2019 74     QT Interval 10/22/2019 372     QTC Interval 10/22/2019 418     P Axis 10/22/2019 66     QRS Axis 10/22/2019 17     T Wave [place] 10/22/2019 27    Hospital Outpatient Visit on 10/10/2019   Component Date Value    EXT Preg Test, Ur 10/10/2019 Negative     Control 10/10/2019 Valid     POC Glucose 10/10/2019 136    Hospital Outpatient Visit on 10/08/2019   Component Date Value    Protocol Name 10/08/2019 ERICK     Time In Exercise Phase 10/08/2019 00:10:01     MAX   SYSTOLIC BP 55/01/5100 793     Max Diastolic Bp 42/51/1672 72     Max Heart Rate 10/08/2019 173     Max Predicted Heart Rate 10/08/2019 198     Reason for Termination 10/08/2019 Target Heart Rate Achieved     Test Indication 10/08/2019 Dyspnea     Target Hr Formular 10/08/2019 (220 - Age)*100%     Chest Pain Statement 10/08/2019 non-limiting Admission on 09/22/2019, Discharged on 09/22/2019   Component Date Value    WBC 09/22/2019 8 08     RBC 09/22/2019 4 63     Hemoglobin 09/22/2019 13 6     Hematocrit 09/22/2019 40 0     MCV 09/22/2019 86     MCH 09/22/2019 29 4     MCHC 09/22/2019 34 0     RDW 09/22/2019 11 9     Platelets 69/61/6225 208     MPV 09/22/2019 11 7     Sodium 09/22/2019 136     Potassium 09/22/2019 3 7     Chloride 09/22/2019 99*    CO2 09/22/2019 27     ANION GAP 09/22/2019 10     BUN 09/22/2019 11     Creatinine 09/22/2019 0 90     Glucose 09/22/2019 371*    Calcium 09/22/2019 9 2     AST 09/22/2019 12     ALT 09/22/2019 15     Alkaline Phosphatase 09/22/2019 97     Total Protein 09/22/2019 8 0     Albumin 09/22/2019 3 8     Total Bilirubin 09/22/2019 0 50     eGFR 09/22/2019 91     BETA-HYDROXYBUTYRATE 09/22/2019 0 1     pH, Dariel 09/22/2019 7 408*    pCO2, Dariel 09/22/2019 39 2*    pO2, Dariel 09/22/2019 49 3*    HCO3, Dariel 09/22/2019 24 2     Base Excess, Dariel 09/22/2019 -0 3     O2 Content, Dariel 09/22/2019 16 6     O2 HGB, VENOUS 09/22/2019 84 0*    Lipase 09/22/2019 190     Magnesium 09/22/2019 1 8     EXT PREG TEST UR (Ref: N* 09/22/2019 negative     Control 09/22/2019 valid     POC Glucose 09/22/2019 374*    Color, UA 09/22/2019 Yellow     Clarity, UA 09/22/2019 Clear     pH, UA 09/22/2019 6 0     Leukocytes, UA 09/22/2019 Negative     Nitrite, UA 09/22/2019 Negative     Protein, UA 09/22/2019 Negative     Glucose, UA 09/22/2019 500 (1/2%)*    Ketones, UA 09/22/2019 Negative     Urobilinogen, UA 09/22/2019 1 0     Bilirubin, UA 09/22/2019 Negative     Blood, UA 09/22/2019 Trace*    Specific [place], UA 09/22/2019 1 020     RBC, UA 09/22/2019 None Seen     WBC, UA 09/22/2019 0-1*    Epithelial Cells 09/22/2019 Occasional     Bacteria, UA 09/22/2019 Occasional    Appointment on 09/05/2019   Component Date Value    Sodium 09/05/2019 137     Potassium 09/05/2019 3 6     Chloride 09/05/2019 102     CO2 09/05/2019 24     ANION GAP 09/05/2019 11     BUN 09/05/2019 17     Creatinine 09/05/2019 0 81     Glucose, Fasting 09/05/2019 127*    Calcium 09/05/2019 9 0     AST 09/05/2019 17     ALT 09/05/2019 16     Alkaline Phosphatase 09/05/2019 72     Total Protein 09/05/2019 7 9     Albumin 09/05/2019 3 8     Total Bilirubin 09/05/2019 0 60     eGFR 09/05/2019 103     WBC 09/05/2019 8 35     RBC 09/05/2019 4 88     Hemoglobin 09/05/2019 14 0     Hematocrit 09/05/2019 43 8     MCV 09/05/2019 90     MCH 09/05/2019 28 7     MCHC 09/05/2019 32 0     RDW 09/05/2019 12 5     MPV 09/05/2019 11 8     Platelets 35/55/0418 199     nRBC 09/05/2019 0     Neutrophils Relative 09/05/2019 38*    Immat GRANS % 09/05/2019 1     Lymphocytes Relative 09/05/2019 51*    Monocytes Relative 09/05/2019 8     Eosinophils Relative 09/05/2019 2     Basophils Relative 09/05/2019 0     Neutrophils Absolute 09/05/2019 3 17     Immature Grans Absolute 09/05/2019 0 04     Lymphocytes Absolute 09/05/2019 4 22     Monocytes Absolute 09/05/2019 0 69     Eosinophils Absolute 09/05/2019 0 20     Basophils Absolute 09/05/2019 0 03     TSH 3RD GENERATON 09/05/2019 4 617*    Free T4 09/05/2019 1 00     Hemoglobin A1C 09/05/2019 7 0*    EAG 09/05/2019 154     Magnesium 09/05/2019 2 0     Phosphorus 09/05/2019 4 0     Cortisol - AM 09/05/2019 26 8*    ACTH 09/05/2019 28 7     Normetanephrine, Free 09/05/2019 37     Metanephrine, Free 09/05/2019 24     Cholesterol 09/05/2019 213*    Triglycerides 09/05/2019 151*    HDL, Direct 09/05/2019 54     LDL Calculated 09/05/2019 129*    Non-HDL-Chol (CHOL-HDL) 09/05/2019 159     Proinsulin 09/05/2019 8 1     C-Peptide 09/05/2019 1 7     Amylase 09/05/2019 57     Lipase 09/05/2019 170     Insulin AutoAb 09/05/2019 526*    Rheumatoid Factor 09/05/2019 Negative    Transcribe Orders on 09/05/2019   Component Date Value    Color, UA 09/05/2019 Yellow     Clarity, UA 09/05/2019 Clear     Specific Gravity, UA 09/05/2019 1 015     pH, UA 09/05/2019 7 0     Leukocytes, UA 09/05/2019 Trace*    Nitrite, UA 09/05/2019 Negative     Protein, UA 09/05/2019 Negative     Glucose, UA 09/05/2019 Negative     Ketones, UA 09/05/2019 Negative     Urobilinogen, UA 09/05/2019 0 2     Bilirubin, UA 09/05/2019 Negative     Blood, UA 09/05/2019 Negative     Creatinine, Ur 09/05/2019 146 0     Microalbum  ,U,Random 09/05/2019 5 9     Microalb Creat Ratio 09/05/2019 4     RBC, UA 09/05/2019 0-1*    WBC, UA 09/05/2019 1-2*    Epithelial Cells 09/05/2019 Occasional     Bacteria, UA 09/05/2019 Moderate*    MUCUS THREADS 09/05/2019 Occasional*     I have personally reviewed all pertinent laboratory/tests results  No results found for this or any previous visit  Assessment/Plan:       Diagnoses and all orders for this visit:    Moderate recurrent major depression (HCC)  -     vilazodone (Viibryd) 10 mg tablet; Take 1 tablet (10 mg total) by mouth daily with breakfast    Chronic post-traumatic stress disorder (PTSD)    FRANCIS (generalized anxiety disorder)  -     LORazepam (ATIVAN) 0 5 mg tablet; Take 1 tablet (0 5 mg total) by mouth 2 (two) times a day as needed for anxiety    Other orders  -     cholecalciferol (VITAMIN D3) 1,000 units tablet; Take 4,000 Units by mouth daily          Treatment Recommendations/Precautions/Plan:    Patient has been educated about their diagnosis and treatment modalities  They voiced understanding and agreement with the following plan:    Continue Ativan 0 5 mg b i d  p r n  for anxiety and onset of panic attacks  Start Viibryd 10 mg p o  q d  with breakfast for depression and anxiety symptoms  Did discuss taking this medication with food for best efficacy and decreased GI intolerance side effects    Discussed possibility of prior authorization with this medication    Medication management every 6 weeks  Continue psychotherapy with [facility] therapist Mya Funes for PTSD management as well as coping with depression and anxiety  Aware of need to follow up with family physician for medical issues as well as endocrinology for uncontrolled diabetes mellitus type 1    -Discussed self monitoring of symptoms, and symptom monitoring tools     -Patient has been informed to call office with any questions or concerns prior to next office visit       -Completed and signed during the session: Not applicable - Treatment Plan to be completed by [address] [place] [address] E therapist    Risks/Benefits      Risks, Benefits And Possible Side Effects Of Medications:    Risks, benefits, and possible side effects of medications explained to Sera Alva including risk of sedation, GI intolerance, dizziness, headaches, sexual dysfunction, hyponatremia, SIADH, not taking with grapefruit juice, increased depression or suicidality and serotonin syndrome related to treatment with Viibryd and risks of misuse, abuse or dependence, sedation and respiratory depression related to treatment with benzodiazepine medications  She verbalizes understanding and agreement for treatment  Recent urine pregnancy test per chart was negative on 02/04/2020  Controlled Medication Discussion:     Sera Alva has been filling controlled prescriptions on time as prescribed according to [facility]  Discussed with Sera Alva the risks of sedation, respiratory depression, impairment of ability to drive and potential for abuse and addiction related to treatment with benzodiazepine medications  She understands risk of treatment with benzodiazepine medications, agrees to not drive if feels impaired and agrees to take medications as prescribed    Psychotherapy Provided:     Individual psychotherapy provided: Medication changes discussed with Sera Alva  Medication education provided to Sera Nationyovany    Importance of medication and treatment compliance reviewed with VAL Lott PA-C 02/27/20

## 2020-03-04 DIAGNOSIS — Z91.018 FOOD ALLERGY: Primary | ICD-10-CM

## 2020-03-04 RX ORDER — EPINEPHRINE 0.3 MG/.3ML
0.3 INJECTION SUBCUTANEOUS ONCE
Qty: 0.6 ML | Refills: 0 | Status: SHIPPED | OUTPATIENT
Start: 2020-03-04 | End: 2021-05-19 | Stop reason: SDUPTHER

## 2020-03-06 ENCOUNTER — TELEPHONE (OUTPATIENT)
Dept: INTERNAL MEDICINE CLINIC | Facility: CLINIC | Age: 25
End: 2020-03-06

## 2020-03-06 NOTE — TELEPHONE ENCOUNTER
Completed peer to peer regarding denial for brain MRI  Reviewer feels that there is not enough documentation to support doing an MRI and recommends that this be further discussed with endocrinology  Will reach out to her provider to discuss this

## 2020-03-06 NOTE — TELEPHONE ENCOUNTER
Spoke with Dr Carmen Barahona from [place] [address] endocrinology and we were able to go over Jenny's history  He does seem to agree that it makes sense to evaluate for a central pathology given her complaints  He will be contacting Yassine Alvarado and plans to order estrogen with gonadotropins and possibly prolactin  Will await results

## 2020-03-12 ENCOUNTER — TELEPHONE (OUTPATIENT)
Dept: INTERNAL MEDICINE CLINIC | Facility: CLINIC | Age: 25
End: 2020-03-12

## 2020-03-12 NOTE — TELEPHONE ENCOUNTER
Spoke with pt  She is aware that MRI Brain was denied  Ellen Elliott spoke with her endocrinologist who is ordering labs for pt

## 2020-03-12 NOTE — TELEPHONE ENCOUNTER
Hi,    This was denied, [facility] is aware  There was another doctor that [facility] spoke to and that doctor's office was supposed to talk to patient      Thank you,  Iram

## 2020-03-18 ENCOUNTER — SOCIAL WORK (OUTPATIENT)
Dept: BEHAVIORAL/MENTAL HEALTH CLINIC | Facility: CLINIC | Age: 25
End: 2020-03-18
Payer: COMMERCIAL

## 2020-03-18 ENCOUNTER — HOSPITAL ENCOUNTER (OUTPATIENT)
Dept: ULTRASOUND IMAGING | Facility: HOSPITAL | Age: 25
Discharge: HOME | End: 2020-03-18
Payer: COMMERCIAL

## 2020-03-18 ENCOUNTER — APPOINTMENT (OUTPATIENT)
Dept: MRI IMAGING | Facility: HOSPITAL | Age: 25
End: 2020-03-18
Payer: COMMERCIAL

## 2020-03-18 ENCOUNTER — TELEPHONE (OUTPATIENT)
Dept: PSYCHIATRY | Facility: CLINIC | Age: 25
End: 2020-03-18

## 2020-03-18 DIAGNOSIS — R10.9 FLANK PAIN: ICD-10-CM

## 2020-03-18 DIAGNOSIS — F42.2 MIXED OBSESSIONAL THOUGHTS AND ACTS: ICD-10-CM

## 2020-03-18 DIAGNOSIS — F33.1 MODERATE RECURRENT MAJOR DEPRESSION (HCC): ICD-10-CM

## 2020-03-18 DIAGNOSIS — F41.1 GAD (GENERALIZED ANXIETY DISORDER): Primary | ICD-10-CM

## 2020-03-18 DIAGNOSIS — F43.12 CHRONIC POST-TRAUMATIC STRESS DISORDER (PTSD): ICD-10-CM

## 2020-03-18 PROCEDURE — 90834 PSYTX W PT 45 MINUTES: CPT | Performed by: SOCIAL WORKER

## 2020-03-18 PROCEDURE — 76770 US EXAM ABDO BACK WALL COMP: CPT

## 2020-03-18 NOTE — TELEPHONE ENCOUNTER
----- Message from Rossana Melendez PA-C sent at 3/18/2020  3:39 PM EDT -----  Regarding: Prior auth for pt  Daniela Simmons, would you be able to look into getting a prior auth for this pt or if the pharmacy did send anything over to the office please? Thank you,    Alexy Shafer  ----- Message -----  From: Chadwick Butler  Sent: 3/18/2020   2:57 PM EDT  To: SANDRA Briceno,    I have BODØ here  She has tried several times to  Vibryd but it needs prior [facility] - which we figured  [facility] told her the date it was sent over here (that it was needed) but I don't know     She is interested in taking it

## 2020-03-18 NOTE — TELEPHONE ENCOUNTER
Nursing did not receive messages and no notifications iin [employer] for a prior authorization to be completed for Viibryd before this  Will follow up with pharmacy

## 2020-03-18 NOTE — PSYCH
Psychotherapy Provided: Individual Psychotherapy 50 minutes     Length of time in session: 50 minutes, follow up in 1 week    Goals addressed in session: Goal 1 and Goal 2     Pain:      none    0    Current suicide risk : Low     D: Met with Jenny individually  Depressive symptoms 'the same'  Asked about Jaki Spicer stated she couldn't  due to required Pre Auth  I sent info to our people during session  Session focused upon ongoing health concerns - having MRI of brain to r/o Pituitary gland issues  Shania Spicer is hoping this is the issue as she reviewed symptoms and majority match to hers  Communication obstacles with [employer] discussed  Both were able to verbalize feelings of frustration, mistrust Portillo Hart towards [employer])  Denied Peyton Pearson requested a letter expressing her therapy due to trauma - that she is not living at home and [employer] should be based on no income  Signed release for both myself and Bev Houston as Shania Spicer may be requesting one form her as well  Denied SI    A: Shania Spicer presented with depressed mood and constricted affect  She remains overwhelmed with both medical and mental health symptoms  Also struggles with [employer] right now have added more stressors  P: Continue individual therapy, ongoing discussion regarding relationship    Behavioral Health Treatment Plan [facility]: Diagnosis and Treatment Plan explained to Amador Serrano relates understanding diagnosis and is agreeable to Treatment Plan   Yes

## 2020-03-19 NOTE — TELEPHONE ENCOUNTER
Nursing received a denial from [address] [place] [address] for Viibryd due to records at [address]  reflecting the patient has alternative pharmacy benefits  Nursing called patient and she stated that she only has [employer] and that her [employer] ended 12/2029  She reports trying to get things straightened out with insurance with [employer] recently and that they for some reason had Lesley Stern as an active insurance  Patient reports that she has never had keystone and [employer] is her only coverage at this time  She reported [employer] was taking care of this issue  Patient stated that she will call [employer] tomorrow and call back to nursing with outcome of her call with insurance

## 2020-03-19 NOTE — TELEPHONE ENCOUNTER
Nursing faxed prior authorization request for Viibryd to [facility] with last office notes   Will await outcome of this prior authorization request

## 2020-03-25 ENCOUNTER — TELEPHONE (OUTPATIENT)
Dept: PSYCHIATRY | Facility: CLINIC | Age: 25
End: 2020-03-25

## 2020-03-25 ENCOUNTER — TELEMEDICINE (OUTPATIENT)
Dept: BEHAVIORAL/MENTAL HEALTH CLINIC | Facility: CLINIC | Age: 25
End: 2020-03-25
Payer: COMMERCIAL

## 2020-03-25 DIAGNOSIS — F41.1 GAD (GENERALIZED ANXIETY DISORDER): ICD-10-CM

## 2020-03-25 DIAGNOSIS — F43.12 CHRONIC POST-TRAUMATIC STRESS DISORDER (PTSD): Primary | ICD-10-CM

## 2020-03-25 PROCEDURE — 90834 PSYTX W PT 45 MINUTES: CPT | Performed by: SOCIAL WORKER
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
## 2020-03-25 NOTE — TELEPHONE ENCOUNTER
Zhao Reid returned call from Nursing  She stated that she had contacted [place] and discussed with them the fact that she has no other insurance  She stated that they advised her they are "opening a case to investigate" why her records reflect that she does have another primary insurance which is why prior Shanon Jones is being denied  (They did not give her a case number she stated )     Zhao Reid stated that they advised her they would contact her back within 14 days  It has been one week since her call and she has not heard any news  Zhao Reid stated she will try to contact them again  She stated that she feels "[employer] is giving me the run around and I am feeling frustrated " Zhao Reid stated that she does not understand she has obtained other medications with no problem going through [employer] (recently picked up Insulin no issue and MRI was [facility] of [facility]--but nothing about having another primary insurance  Zhao Reid thanked Nursing for assistance and stated she will be in touch once she has any news [place] [address]  For Adina's information

## 2020-03-25 NOTE — TELEPHONE ENCOUNTER
No return call received to date by Nursing from Jessica Kurtz as per last message that she will contact her only present insurance company, [employer] to clarify that she only has one insurance; [employer] and no other insurance as her primary (as per their letter of denial dated 3/19/2020 for Viibryd 10 mg tabs )     Nursing spoke with Jenny's pharmacist at [facility]  She verified that they also only have [employer] as primary and Jenny's only insurance  Pharmacist asked if provider wanted to change medication while Jessica Kurtz works to contact [address] [facility] to have her records updated to resubmit prior [facility] for [employer]  Pharmacist also stated that all of Jenny's other medications have gone through with no problem and no prior auth needed  Nursing will call Jessica Kurtz again to encourage she follow up with [address] [facility]  For LYNDSEY [facility]'s review

## 2020-03-25 NOTE — TELEPHONE ENCOUNTER
Nursing called Xavi Richey and there was no answer  Call went directly to   Left Nursing office phone numbers for return call  For [employer]

## 2020-03-25 NOTE — TELEPHONE ENCOUNTER
Called pt and left VM that I will write a letter of independence from her parents for her university, but she would need to sign and hand in an ADINA before I can send it there

## 2020-03-26 ENCOUNTER — TELEPHONE (OUTPATIENT)
Dept: PSYCHIATRY | Facility: CLINIC | Age: 25
End: 2020-03-26

## 2020-03-26 NOTE — TELEPHONE ENCOUNTER
Called patient regarding letter patient requested ready for pickup or we can mail this to her unable to leave aylin

## 2020-03-26 NOTE — TELEPHONE ENCOUNTER
Nursing sent prior authorization for Viibryd to [facility] and this request was approved through 3/26/2021  Nursing called pharmacy and made them aware but was unable to contact patient due to a filled voicemail   Nursing will follow up on next business day to inform patient of approval

## 2020-03-27 ENCOUNTER — TELEPHONE (OUTPATIENT)
Dept: PSYCHIATRY | Facility: CLINIC | Age: 25
End: 2020-03-27

## 2020-03-27 NOTE — TELEPHONE ENCOUNTER
HASEEB Alvarado:  Medication requiring P  A was approved through 03/26/21; gave nursing number to call with questions/concerns       (See other Encounters from 03/18/20 and 03/25/20 )

## 2020-04-01 ENCOUNTER — TELEMEDICINE (OUTPATIENT)
Dept: BEHAVIORAL/MENTAL HEALTH CLINIC | Facility: CLINIC | Age: 25
End: 2020-04-01
Payer: COMMERCIAL

## 2020-04-01 DIAGNOSIS — F43.12 CHRONIC POST-TRAUMATIC STRESS DISORDER (PTSD): Primary | ICD-10-CM

## 2020-04-01 DIAGNOSIS — F33.1 MODERATE RECURRENT MAJOR DEPRESSION (HCC): ICD-10-CM

## 2020-04-01 DIAGNOSIS — F41.1 GAD (GENERALIZED ANXIETY DISORDER): ICD-10-CM

## 2020-04-01 DIAGNOSIS — F42.2 MIXED OBSESSIONAL THOUGHTS AND ACTS: ICD-10-CM

## 2020-04-01 PROCEDURE — 90834 PSYTX W PT 45 MINUTES: CPT | Performed by: SOCIAL WORKER

## 2020-04-07 ENCOUNTER — TELEPHONE (OUTPATIENT)
Dept: OBGYN CLINIC | Facility: CLINIC | Age: 25
End: 2020-04-07

## 2020-04-07 DIAGNOSIS — Z30.41 ORAL CONTRACEPTIVE PILL SURVEILLANCE: ICD-10-CM

## 2020-04-07 RX ORDER — LEVONORGESTREL AND ETHINYL ESTRADIOL 0.15-0.03
KIT ORAL
Qty: 84 TABLET | Refills: 0 | Status: SHIPPED | OUTPATIENT
Start: 2020-04-07 | End: 2020-06-19 | Stop reason: SDDI

## 2020-04-08 ENCOUNTER — TELEMEDICINE (OUTPATIENT)
Dept: BEHAVIORAL/MENTAL HEALTH CLINIC | Facility: CLINIC | Age: 25
End: 2020-04-08
Payer: COMMERCIAL

## 2020-04-08 DIAGNOSIS — F41.1 GAD (GENERALIZED ANXIETY DISORDER): Primary | ICD-10-CM

## 2020-04-08 DIAGNOSIS — F43.12 CHRONIC POST-TRAUMATIC STRESS DISORDER (PTSD): ICD-10-CM

## 2020-04-08 DIAGNOSIS — F33.1 MODERATE RECURRENT MAJOR DEPRESSION (HCC): ICD-10-CM

## 2020-04-08 DIAGNOSIS — F42.2 MIXED OBSESSIONAL THOUGHTS AND ACTS: ICD-10-CM

## 2020-04-08 PROCEDURE — 90834 PSYTX W PT 45 MINUTES: CPT | Performed by: SOCIAL WORKER

## 2020-04-15 ENCOUNTER — TELEMEDICINE (OUTPATIENT)
Dept: PSYCHIATRY | Facility: CLINIC | Age: 25
End: 2020-04-15
Payer: COMMERCIAL

## 2020-04-15 DIAGNOSIS — F33.2 SEVERE EPISODE OF RECURRENT MAJOR DEPRESSIVE DISORDER, WITHOUT PSYCHOTIC FEATURES (HCC): Primary | ICD-10-CM

## 2020-04-15 DIAGNOSIS — F41.1 GENERALIZED ANXIETY DISORDER WITH PANIC ATTACKS: ICD-10-CM

## 2020-04-15 DIAGNOSIS — F41.0 GENERALIZED ANXIETY DISORDER WITH PANIC ATTACKS: ICD-10-CM

## 2020-04-15 DIAGNOSIS — F43.12 CHRONIC POST-TRAUMATIC STRESS DISORDER (PTSD): ICD-10-CM

## 2020-04-15 PROCEDURE — 99213 OFFICE O/P EST LOW 20 MIN: CPT | Performed by: PHYSICIAN ASSISTANT

## 2020-04-20 ENCOUNTER — TELEPHONE (OUTPATIENT)
Dept: INTERNAL MEDICINE CLINIC | Facility: CLINIC | Age: 25
End: 2020-04-20

## 2020-04-21 ENCOUNTER — TELEMEDICINE (OUTPATIENT)
Dept: INTERNAL MEDICINE CLINIC | Facility: CLINIC | Age: 25
End: 2020-04-21
Payer: COMMERCIAL

## 2020-04-21 DIAGNOSIS — R20.2 PARESTHESIA OF LEFT LEG: Primary | ICD-10-CM

## 2020-04-21 DIAGNOSIS — R06.2 WHEEZING: ICD-10-CM

## 2020-04-21 DIAGNOSIS — E10.65 TYPE 1 DIABETES MELLITUS WITH HYPERGLYCEMIA (HCC): ICD-10-CM

## 2020-04-21 PROCEDURE — 99214 OFFICE O/P EST MOD 30 MIN: CPT | Performed by: NURSE PRACTITIONER

## 2020-04-21 RX ORDER — NAPROXEN 500 MG/1
500 TABLET ORAL 2 TIMES DAILY WITH MEALS
Qty: 30 TABLET | Refills: 0 | Status: SHIPPED | OUTPATIENT
Start: 2020-04-21 | End: 2020-05-15

## 2020-04-21 RX ORDER — ALBUTEROL SULFATE 90 UG/1
1 AEROSOL, METERED RESPIRATORY (INHALATION) EVERY 6 HOURS PRN
Qty: 1 INHALER | Refills: 5 | Status: SHIPPED | OUTPATIENT
Start: 2020-04-21 | End: 2021-11-02

## 2020-04-22 ENCOUNTER — TELEMEDICINE (OUTPATIENT)
Dept: BEHAVIORAL/MENTAL HEALTH CLINIC | Facility: CLINIC | Age: 25
End: 2020-04-22
Payer: COMMERCIAL

## 2020-04-22 DIAGNOSIS — F33.2 SEVERE EPISODE OF RECURRENT MAJOR DEPRESSIVE DISORDER, WITHOUT PSYCHOTIC FEATURES (HCC): ICD-10-CM

## 2020-04-22 DIAGNOSIS — F43.12 CHRONIC POST-TRAUMATIC STRESS DISORDER (PTSD): Primary | ICD-10-CM

## 2020-04-22 DIAGNOSIS — F41.1 GENERALIZED ANXIETY DISORDER WITH PANIC ATTACKS: ICD-10-CM

## 2020-04-22 DIAGNOSIS — F41.0 GENERALIZED ANXIETY DISORDER WITH PANIC ATTACKS: ICD-10-CM

## 2020-04-22 DIAGNOSIS — F42.2 MIXED OBSESSIONAL THOUGHTS AND ACTS: ICD-10-CM

## 2020-04-22 PROCEDURE — 90834 PSYTX W PT 45 MINUTES: CPT | Performed by: SOCIAL WORKER

## 2020-04-29 ENCOUNTER — TELEMEDICINE (OUTPATIENT)
Dept: BEHAVIORAL/MENTAL HEALTH CLINIC | Facility: CLINIC | Age: 25
End: 2020-04-29
Payer: COMMERCIAL

## 2020-04-29 DIAGNOSIS — F41.0 GENERALIZED ANXIETY DISORDER WITH PANIC ATTACKS: ICD-10-CM

## 2020-04-29 DIAGNOSIS — F41.1 GENERALIZED ANXIETY DISORDER WITH PANIC ATTACKS: ICD-10-CM

## 2020-04-29 DIAGNOSIS — F33.2 SEVERE EPISODE OF RECURRENT MAJOR DEPRESSIVE DISORDER, WITHOUT PSYCHOTIC FEATURES (HCC): ICD-10-CM

## 2020-04-29 DIAGNOSIS — F43.12 CHRONIC POST-TRAUMATIC STRESS DISORDER (PTSD): Primary | ICD-10-CM

## 2020-04-29 DIAGNOSIS — F42.2 MIXED OBSESSIONAL THOUGHTS AND ACTS: ICD-10-CM

## 2020-04-29 PROCEDURE — 90834 PSYTX W PT 45 MINUTES: CPT | Performed by: SOCIAL WORKER

## 2020-05-06 ENCOUNTER — TELEMEDICINE (OUTPATIENT)
Dept: BEHAVIORAL/MENTAL HEALTH CLINIC | Facility: CLINIC | Age: 25
End: 2020-05-06
Payer: COMMERCIAL

## 2020-05-06 DIAGNOSIS — F41.1 GENERALIZED ANXIETY DISORDER WITH PANIC ATTACKS: ICD-10-CM

## 2020-05-06 DIAGNOSIS — F41.0 GENERALIZED ANXIETY DISORDER WITH PANIC ATTACKS: ICD-10-CM

## 2020-05-06 DIAGNOSIS — F42.2 MIXED OBSESSIONAL THOUGHTS AND ACTS: ICD-10-CM

## 2020-05-06 DIAGNOSIS — F43.12 CHRONIC POST-TRAUMATIC STRESS DISORDER (PTSD): Primary | ICD-10-CM

## 2020-05-06 DIAGNOSIS — F33.2 SEVERE EPISODE OF RECURRENT MAJOR DEPRESSIVE DISORDER, WITHOUT PSYCHOTIC FEATURES (HCC): ICD-10-CM

## 2020-05-06 PROCEDURE — 90834 PSYTX W PT 45 MINUTES: CPT | Performed by: SOCIAL WORKER

## 2020-05-11 ENCOUNTER — TELEPHONE (OUTPATIENT)
Dept: PSYCHIATRY | Facility: CLINIC | Age: 25
End: 2020-05-11

## 2020-05-13 ENCOUNTER — TELEMEDICINE (OUTPATIENT)
Dept: BEHAVIORAL/MENTAL HEALTH CLINIC | Facility: CLINIC | Age: 25
End: 2020-05-13
Payer: COMMERCIAL

## 2020-05-13 DIAGNOSIS — F33.2 SEVERE EPISODE OF RECURRENT MAJOR DEPRESSIVE DISORDER, WITHOUT PSYCHOTIC FEATURES (HCC): ICD-10-CM

## 2020-05-13 DIAGNOSIS — F41.0 GENERALIZED ANXIETY DISORDER WITH PANIC ATTACKS: ICD-10-CM

## 2020-05-13 DIAGNOSIS — F43.12 CHRONIC POST-TRAUMATIC STRESS DISORDER (PTSD): Primary | ICD-10-CM

## 2020-05-13 DIAGNOSIS — F41.1 GENERALIZED ANXIETY DISORDER WITH PANIC ATTACKS: ICD-10-CM

## 2020-05-13 PROCEDURE — 90834 PSYTX W PT 45 MINUTES: CPT | Performed by: SOCIAL WORKER

## 2020-05-14 DIAGNOSIS — R20.2 PARESTHESIA OF LEFT LEG: ICD-10-CM

## 2020-05-15 RX ORDER — NAPROXEN 500 MG/1
TABLET ORAL
Qty: 30 TABLET | Refills: 0 | Status: SHIPPED | OUTPATIENT
Start: 2020-05-15 | End: 2020-06-19 | Stop reason: ALTCHOICE

## 2020-05-20 ENCOUNTER — TELEPHONE (OUTPATIENT)
Dept: PSYCHIATRY | Facility: CLINIC | Age: 25
End: 2020-05-20

## 2020-05-20 ENCOUNTER — TELEMEDICINE (OUTPATIENT)
Dept: BEHAVIORAL/MENTAL HEALTH CLINIC | Facility: CLINIC | Age: 25
End: 2020-05-20
Payer: COMMERCIAL

## 2020-05-20 DIAGNOSIS — F43.12 CHRONIC POST-TRAUMATIC STRESS DISORDER (PTSD): Primary | ICD-10-CM

## 2020-05-20 DIAGNOSIS — F41.1 GENERALIZED ANXIETY DISORDER WITH PANIC ATTACKS: ICD-10-CM

## 2020-05-20 DIAGNOSIS — F33.2 SEVERE EPISODE OF RECURRENT MAJOR DEPRESSIVE DISORDER, WITHOUT PSYCHOTIC FEATURES (HCC): ICD-10-CM

## 2020-05-20 DIAGNOSIS — F42.2 MIXED OBSESSIONAL THOUGHTS AND ACTS: ICD-10-CM

## 2020-05-20 DIAGNOSIS — F41.0 GENERALIZED ANXIETY DISORDER WITH PANIC ATTACKS: ICD-10-CM

## 2020-05-20 PROCEDURE — 90834 PSYTX W PT 45 MINUTES: CPT | Performed by: SOCIAL WORKER

## 2020-05-27 ENCOUNTER — TELEMEDICINE (OUTPATIENT)
Dept: BEHAVIORAL/MENTAL HEALTH CLINIC | Facility: CLINIC | Age: 25
End: 2020-05-27
Payer: COMMERCIAL

## 2020-05-27 DIAGNOSIS — F43.12 CHRONIC POST-TRAUMATIC STRESS DISORDER (PTSD): Primary | ICD-10-CM

## 2020-05-27 DIAGNOSIS — F33.2 SEVERE EPISODE OF RECURRENT MAJOR DEPRESSIVE DISORDER, WITHOUT PSYCHOTIC FEATURES (HCC): ICD-10-CM

## 2020-05-27 DIAGNOSIS — F42.2 MIXED OBSESSIONAL THOUGHTS AND ACTS: ICD-10-CM

## 2020-05-27 DIAGNOSIS — F41.1 GENERALIZED ANXIETY DISORDER WITH PANIC ATTACKS: ICD-10-CM

## 2020-05-27 DIAGNOSIS — F41.0 GENERALIZED ANXIETY DISORDER WITH PANIC ATTACKS: ICD-10-CM

## 2020-05-27 PROCEDURE — 90834 PSYTX W PT 45 MINUTES: CPT | Performed by: SOCIAL WORKER

## 2020-06-02 ENCOUNTER — TELEPHONE (OUTPATIENT)
Dept: PSYCHIATRY | Facility: CLINIC | Age: 25
End: 2020-06-02

## 2020-06-03 ENCOUNTER — TELEMEDICINE (OUTPATIENT)
Dept: BEHAVIORAL/MENTAL HEALTH CLINIC | Facility: CLINIC | Age: 25
End: 2020-06-03
Payer: COMMERCIAL

## 2020-06-03 DIAGNOSIS — F41.1 GENERALIZED ANXIETY DISORDER WITH PANIC ATTACKS: ICD-10-CM

## 2020-06-03 DIAGNOSIS — F43.12 CHRONIC POST-TRAUMATIC STRESS DISORDER (PTSD): Primary | ICD-10-CM

## 2020-06-03 DIAGNOSIS — F33.2 SEVERE EPISODE OF RECURRENT MAJOR DEPRESSIVE DISORDER, WITHOUT PSYCHOTIC FEATURES (HCC): ICD-10-CM

## 2020-06-03 DIAGNOSIS — F41.0 GENERALIZED ANXIETY DISORDER WITH PANIC ATTACKS: ICD-10-CM

## 2020-06-03 DIAGNOSIS — F42.2 MIXED OBSESSIONAL THOUGHTS AND ACTS: ICD-10-CM

## 2020-06-03 PROCEDURE — 90834 PSYTX W PT 45 MINUTES: CPT | Performed by: SOCIAL WORKER

## 2020-06-04 ENCOUNTER — TELEMEDICINE (OUTPATIENT)
Dept: INTERNAL MEDICINE CLINIC | Facility: CLINIC | Age: 25
End: 2020-06-04
Payer: COMMERCIAL

## 2020-06-04 DIAGNOSIS — R20.2 PARESTHESIA OF LEFT LEG: ICD-10-CM

## 2020-06-04 DIAGNOSIS — Z20.828 EXPOSURE TO SARS-ASSOCIATED CORONAVIRUS: ICD-10-CM

## 2020-06-04 DIAGNOSIS — F33.2 SEVERE EPISODE OF RECURRENT MAJOR DEPRESSIVE DISORDER, WITHOUT PSYCHOTIC FEATURES (HCC): ICD-10-CM

## 2020-06-04 DIAGNOSIS — Z20.828 EXPOSURE TO SARS-ASSOCIATED CORONAVIRUS: Primary | ICD-10-CM

## 2020-06-04 DIAGNOSIS — E10.65 TYPE 1 DIABETES MELLITUS WITH HYPERGLYCEMIA (HCC): ICD-10-CM

## 2020-06-04 PROCEDURE — 99214 OFFICE O/P EST MOD 30 MIN: CPT | Performed by: NURSE PRACTITIONER

## 2020-06-04 PROCEDURE — U0003 INFECTIOUS AGENT DETECTION BY NUCLEIC ACID (DNA OR RNA); SEVERE ACUTE RESPIRATORY SYNDROME CORONAVIRUS 2 (SARS-COV-2) (CORONAVIRUS DISEASE [COVID-19]), AMPLIFIED PROBE TECHNIQUE, MAKING USE OF HIGH THROUGHPUT TECHNOLOGIES AS DESCRIBED BY CMS-2020-01-R: HCPCS

## 2020-06-06 LAB — SARS-COV-2 RNA SPEC QL NAA+PROBE: NOT DETECTED

## 2020-06-10 ENCOUNTER — TELEMEDICINE (OUTPATIENT)
Dept: BEHAVIORAL/MENTAL HEALTH CLINIC | Facility: CLINIC | Age: 25
End: 2020-06-10
Payer: COMMERCIAL

## 2020-06-10 DIAGNOSIS — F41.1 GENERALIZED ANXIETY DISORDER WITH PANIC ATTACKS: ICD-10-CM

## 2020-06-10 DIAGNOSIS — F41.0 GENERALIZED ANXIETY DISORDER WITH PANIC ATTACKS: ICD-10-CM

## 2020-06-10 DIAGNOSIS — F33.2 SEVERE EPISODE OF RECURRENT MAJOR DEPRESSIVE DISORDER, WITHOUT PSYCHOTIC FEATURES (HCC): ICD-10-CM

## 2020-06-10 DIAGNOSIS — F43.12 CHRONIC POST-TRAUMATIC STRESS DISORDER (PTSD): Primary | ICD-10-CM

## 2020-06-10 DIAGNOSIS — F42.9 OBSESSIVE-COMPULSIVE DISORDER, UNSPECIFIED TYPE: ICD-10-CM

## 2020-06-10 PROCEDURE — 90834 PSYTX W PT 45 MINUTES: CPT | Performed by: SOCIAL WORKER

## 2020-06-19 ENCOUNTER — ANNUAL EXAM (OUTPATIENT)
Dept: OBGYN CLINIC | Facility: CLINIC | Age: 25
End: 2020-06-19
Payer: COMMERCIAL

## 2020-06-19 ENCOUNTER — TELEPHONE (OUTPATIENT)
Dept: PSYCHIATRY | Facility: CLINIC | Age: 25
End: 2020-06-19

## 2020-06-19 VITALS
TEMPERATURE: 98.8 F | SYSTOLIC BLOOD PRESSURE: 120 MMHG | BODY MASS INDEX: 26.31 KG/M2 | HEIGHT: 62 IN | DIASTOLIC BLOOD PRESSURE: 60 MMHG | WEIGHT: 143 LBS

## 2020-06-19 DIAGNOSIS — N91.2 AMENORRHEA: ICD-10-CM

## 2020-06-19 DIAGNOSIS — R22.31 MASS OF RIGHT AXILLA: ICD-10-CM

## 2020-06-19 DIAGNOSIS — Z01.419 ENCNTR FOR GYN EXAM (GENERAL) (ROUTINE) W/O ABN FINDINGS: Primary | ICD-10-CM

## 2020-06-19 PROBLEM — F41.9 ANXIETY: Status: ACTIVE | Noted: 2020-06-19

## 2020-06-19 PROBLEM — F43.10 PTSD (POST-TRAUMATIC STRESS DISORDER): Status: ACTIVE | Noted: 2020-06-19

## 2020-06-19 PROBLEM — L98.9 BUMPS ON SKIN: Status: RESOLVED | Noted: 2019-02-28 | Resolved: 2020-06-19

## 2020-06-19 PROBLEM — N76.0 BACTERIAL VAGINITIS: Status: RESOLVED | Noted: 2019-06-06 | Resolved: 2020-06-19

## 2020-06-19 PROBLEM — B96.89 BACTERIAL VAGINITIS: Status: RESOLVED | Noted: 2019-06-06 | Resolved: 2020-06-19

## 2020-06-19 PROBLEM — E55.9 VITAMIN D DEFICIENCY: Status: ACTIVE | Noted: 2020-06-19

## 2020-06-19 PROBLEM — Z20.828 EXPOSURE TO SARS-ASSOCIATED CORONAVIRUS: Status: RESOLVED | Noted: 2020-06-04 | Resolved: 2020-06-19

## 2020-06-19 PROBLEM — F43.10 PTSD (POST-TRAUMATIC STRESS DISORDER): Status: RESOLVED | Noted: 2020-06-19 | Resolved: 2020-06-19

## 2020-06-19 PROCEDURE — G0145 SCR C/V CYTO,THINLAYER,RESCR: HCPCS | Performed by: PHYSICIAN ASSISTANT

## 2020-06-19 PROCEDURE — 99395 PREV VISIT EST AGE 18-39: CPT | Performed by: PHYSICIAN ASSISTANT

## 2020-06-19 RX ORDER — ERGOCALCIFEROL (VITAMIN D2) 10 MCG
TABLET ORAL DAILY
COMMUNITY
End: 2021-11-02

## 2020-06-23 ENCOUNTER — OFFICE VISIT (OUTPATIENT)
Dept: INTERNAL MEDICINE CLINIC | Facility: CLINIC | Age: 25
End: 2020-06-23
Payer: COMMERCIAL

## 2020-06-23 VITALS
HEIGHT: 62 IN | TEMPERATURE: 98.5 F | WEIGHT: 144.6 LBS | HEART RATE: 79 BPM | RESPIRATION RATE: 16 BRPM | SYSTOLIC BLOOD PRESSURE: 122 MMHG | DIASTOLIC BLOOD PRESSURE: 72 MMHG | OXYGEN SATURATION: 99 % | BODY MASS INDEX: 26.61 KG/M2

## 2020-06-23 DIAGNOSIS — E06.3 HASHIMOTO'S DISEASE: ICD-10-CM

## 2020-06-23 DIAGNOSIS — F33.2 SEVERE EPISODE OF RECURRENT MAJOR DEPRESSIVE DISORDER, WITHOUT PSYCHOTIC FEATURES (HCC): ICD-10-CM

## 2020-06-23 DIAGNOSIS — F41.0 GENERALIZED ANXIETY DISORDER WITH PANIC ATTACKS: ICD-10-CM

## 2020-06-23 DIAGNOSIS — E10.65 TYPE 1 DIABETES MELLITUS WITH HYPERGLYCEMIA (HCC): ICD-10-CM

## 2020-06-23 DIAGNOSIS — G62.9 NEUROPATHY: ICD-10-CM

## 2020-06-23 DIAGNOSIS — F41.1 GENERALIZED ANXIETY DISORDER WITH PANIC ATTACKS: ICD-10-CM

## 2020-06-23 DIAGNOSIS — M13.0 POLYARTHRITIS: Primary | ICD-10-CM

## 2020-06-23 DIAGNOSIS — N91.2 AMENORRHEA: ICD-10-CM

## 2020-06-23 PROCEDURE — 99214 OFFICE O/P EST MOD 30 MIN: CPT | Performed by: NURSE PRACTITIONER

## 2020-06-23 PROCEDURE — 2022F DILAT RTA XM EVC RTNOPTHY: CPT | Performed by: NURSE PRACTITIONER

## 2020-06-23 PROCEDURE — 1036F TOBACCO NON-USER: CPT | Performed by: NURSE PRACTITIONER

## 2020-06-23 PROCEDURE — 3008F BODY MASS INDEX DOCD: CPT | Performed by: NURSE PRACTITIONER

## 2020-06-23 RX ORDER — GABAPENTIN 100 MG/1
300 CAPSULE ORAL
Qty: 90 CAPSULE | Refills: 0 | Status: SHIPPED | OUTPATIENT
Start: 2020-06-23 | End: 2020-07-23

## 2020-06-25 LAB
LAB AP GYN PRIMARY INTERPRETATION: NORMAL
Lab: NORMAL

## 2020-06-29 ENCOUNTER — TELEPHONE (OUTPATIENT)
Dept: OBGYN CLINIC | Facility: CLINIC | Age: 25
End: 2020-06-29

## 2020-06-30 LAB
CCP IGG SERPL-ACNC: <16 UNITS
CRP SERPL-MCNC: 0.8 MG/L
ERYTHROCYTE [SEDIMENTATION RATE] IN BLOOD BY WESTERGREN METHOD: 6 MM/H

## 2020-07-01 ENCOUNTER — TELEMEDICINE (OUTPATIENT)
Dept: BEHAVIORAL/MENTAL HEALTH CLINIC | Facility: CLINIC | Age: 25
End: 2020-07-01
Payer: COMMERCIAL

## 2020-07-01 ENCOUNTER — TRANSCRIBE ORDERS (OUTPATIENT)
Dept: RADIOLOGY | Facility: HOSPITAL | Age: 25
End: 2020-07-01

## 2020-07-01 ENCOUNTER — HOSPITAL ENCOUNTER (OUTPATIENT)
Dept: RADIOLOGY | Facility: HOSPITAL | Age: 25
Discharge: HOME | End: 2020-07-01
Payer: COMMERCIAL

## 2020-07-01 DIAGNOSIS — F41.1 GENERALIZED ANXIETY DISORDER WITH PANIC ATTACKS: ICD-10-CM

## 2020-07-01 DIAGNOSIS — F42.9 OBSESSIVE-COMPULSIVE DISORDER, UNSPECIFIED TYPE: ICD-10-CM

## 2020-07-01 DIAGNOSIS — F41.9 ANXIETY: ICD-10-CM

## 2020-07-01 DIAGNOSIS — F33.2 SEVERE EPISODE OF RECURRENT MAJOR DEPRESSIVE DISORDER, WITHOUT PSYCHOTIC FEATURES (HCC): ICD-10-CM

## 2020-07-01 DIAGNOSIS — F41.0 GENERALIZED ANXIETY DISORDER WITH PANIC ATTACKS: ICD-10-CM

## 2020-07-01 DIAGNOSIS — F43.12 CHRONIC POST-TRAUMATIC STRESS DISORDER (PTSD): Primary | ICD-10-CM

## 2020-07-01 DIAGNOSIS — R22.31 MASS OF RIGHT AXILLA: ICD-10-CM

## 2020-07-01 PROCEDURE — 76882 US LMTD JT/FCL EVL NVASC XTR: CPT

## 2020-07-01 PROCEDURE — 90834 PSYTX W PT 45 MINUTES: CPT | Performed by: SOCIAL WORKER
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
## 2020-07-08 ENCOUNTER — TELEMEDICINE (OUTPATIENT)
Dept: BEHAVIORAL/MENTAL HEALTH CLINIC | Facility: CLINIC | Age: 25
End: 2020-07-08
Payer: COMMERCIAL

## 2020-07-08 ENCOUNTER — TELEPHONE (OUTPATIENT)
Dept: OBGYN CLINIC | Facility: CLINIC | Age: 25
End: 2020-07-08

## 2020-07-08 DIAGNOSIS — F33.2 SEVERE EPISODE OF RECURRENT MAJOR DEPRESSIVE DISORDER, WITHOUT PSYCHOTIC FEATURES (HCC): ICD-10-CM

## 2020-07-08 DIAGNOSIS — F41.0 GENERALIZED ANXIETY DISORDER WITH PANIC ATTACKS: ICD-10-CM

## 2020-07-08 DIAGNOSIS — F43.12 CHRONIC POST-TRAUMATIC STRESS DISORDER (PTSD): ICD-10-CM

## 2020-07-08 DIAGNOSIS — F41.9 ANXIETY: Primary | ICD-10-CM

## 2020-07-08 DIAGNOSIS — F41.1 GENERALIZED ANXIETY DISORDER WITH PANIC ATTACKS: ICD-10-CM

## 2020-07-08 PROCEDURE — 90834 PSYTX W PT 45 MINUTES: CPT | Performed by: SOCIAL WORKER

## 2020-07-08 PROCEDURE — 1036F TOBACCO NON-USER: CPT | Performed by: SOCIAL WORKER

## 2020-07-08 NOTE — TELEPHONE ENCOUNTER
----- Message from Chuckie Barker PA-C sent at 7/7/2020  1:37 PM EDT -----  Please let gregor know that her right axillary ultrasound shows a normal appearing lymph node, no concerning findings
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
## 2020-07-15 ENCOUNTER — TELEMEDICINE (OUTPATIENT)
Dept: BEHAVIORAL/MENTAL HEALTH CLINIC | Facility: CLINIC | Age: 25
End: 2020-07-15
Payer: COMMERCIAL

## 2020-07-15 DIAGNOSIS — F41.0 GENERALIZED ANXIETY DISORDER WITH PANIC ATTACKS: ICD-10-CM

## 2020-07-15 DIAGNOSIS — F42.9 OBSESSIVE-COMPULSIVE DISORDER, UNSPECIFIED TYPE: ICD-10-CM

## 2020-07-15 DIAGNOSIS — F33.2 SEVERE EPISODE OF RECURRENT MAJOR DEPRESSIVE DISORDER, WITHOUT PSYCHOTIC FEATURES (HCC): ICD-10-CM

## 2020-07-15 DIAGNOSIS — F41.1 GENERALIZED ANXIETY DISORDER WITH PANIC ATTACKS: ICD-10-CM

## 2020-07-15 DIAGNOSIS — F43.12 CHRONIC POST-TRAUMATIC STRESS DISORDER (PTSD): Primary | ICD-10-CM

## 2020-07-15 PROCEDURE — 90834 PSYTX W PT 45 MINUTES: CPT | Performed by: SOCIAL WORKER
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
## 2020-07-22 ENCOUNTER — TELEMEDICINE (OUTPATIENT)
Dept: BEHAVIORAL/MENTAL HEALTH CLINIC | Facility: CLINIC | Age: 25
End: 2020-07-22
Payer: COMMERCIAL

## 2020-07-22 DIAGNOSIS — F42.9 OBSESSIVE-COMPULSIVE DISORDER, UNSPECIFIED TYPE: ICD-10-CM

## 2020-07-22 DIAGNOSIS — F41.9 ANXIETY: Primary | ICD-10-CM

## 2020-07-22 DIAGNOSIS — F43.12 CHRONIC POST-TRAUMATIC STRESS DISORDER (PTSD): ICD-10-CM

## 2020-07-22 DIAGNOSIS — F41.0 GENERALIZED ANXIETY DISORDER WITH PANIC ATTACKS: ICD-10-CM

## 2020-07-22 DIAGNOSIS — F33.2 SEVERE EPISODE OF RECURRENT MAJOR DEPRESSIVE DISORDER, WITHOUT PSYCHOTIC FEATURES (HCC): ICD-10-CM

## 2020-07-22 DIAGNOSIS — F41.1 GENERALIZED ANXIETY DISORDER WITH PANIC ATTACKS: ICD-10-CM

## 2020-07-22 PROCEDURE — 90834 PSYTX W PT 45 MINUTES: CPT | Performed by: SOCIAL WORKER

## 2020-07-23 DIAGNOSIS — G62.9 NEUROPATHY: ICD-10-CM

## 2020-07-23 RX ORDER — GABAPENTIN 100 MG/1
CAPSULE ORAL
Qty: 90 CAPSULE | Refills: 4 | Status: SHIPPED | OUTPATIENT
Start: 2020-07-23 | End: 2020-12-21

## 2020-07-27 ENCOUNTER — TELEPHONE (OUTPATIENT)
Dept: PSYCHIATRY | Facility: CLINIC | Age: 25
End: 2020-07-27

## 2020-07-29 ENCOUNTER — TELEMEDICINE (OUTPATIENT)
Dept: BEHAVIORAL/MENTAL HEALTH CLINIC | Facility: CLINIC | Age: 25
End: 2020-07-29
Payer: COMMERCIAL

## 2020-07-29 DIAGNOSIS — F42.9 OBSESSIVE-COMPULSIVE DISORDER, UNSPECIFIED TYPE: ICD-10-CM

## 2020-07-29 DIAGNOSIS — F33.2 SEVERE EPISODE OF RECURRENT MAJOR DEPRESSIVE DISORDER, WITHOUT PSYCHOTIC FEATURES (HCC): ICD-10-CM

## 2020-07-29 DIAGNOSIS — F41.0 GENERALIZED ANXIETY DISORDER WITH PANIC ATTACKS: ICD-10-CM

## 2020-07-29 DIAGNOSIS — F41.9 ANXIETY: ICD-10-CM

## 2020-07-29 DIAGNOSIS — F41.1 GENERALIZED ANXIETY DISORDER WITH PANIC ATTACKS: ICD-10-CM

## 2020-07-29 DIAGNOSIS — F43.12 CHRONIC POST-TRAUMATIC STRESS DISORDER (PTSD): Primary | ICD-10-CM

## 2020-07-29 PROCEDURE — 90834 PSYTX W PT 45 MINUTES: CPT | Performed by: SOCIAL WORKER

## 2020-07-29 NOTE — PSYCH
Virtual Regular Visit      Assessment/Plan:    Problem List Items Addressed This Visit        Other    Severe episode of recurrent major depressive disorder, without psychotic features (HCC)    Chronic post-traumatic stress disorder (PTSD) - Primary    Generalized anxiety disorder with panic attacks    OCD (obsessive compulsive disorder)    Anxiety               Reason for visit is No chief complaint on file  Encounter provider Rudy Harry    Provider located at [address] [facility]  [address]  [facility] [address]      Recent Visits  Date Type Provider Dept   07/22/20 [facility]   Showing recent visits within past 7 days and meeting all other requirements     Today's Visits  Date Type Provider Dept   07/29/20 [facility]   Showing today's visits and meeting all other requirements     Future Appointments  No visits were found meeting these conditions  Showing future appointments within next 150 days and meeting all other requirements        The patient was identified by name and date of birth  Joanne Talbot was informed that this is a telemedicine visit and that the visit is being conducted through [employer]  My office door was closed  No one else was in the room  She acknowledged consent and understanding of privacy and security of the video platform  The patient has agreed to participate and understands they can discontinue the visit at any time  Patient is aware this is a billable service  Subjective  Joanne Talbot is a 23 y o  female    D: Met with Rhoda Gregory individually via Teams  Depression 'has been really bad the past week '  'I'm not doing well at all ' Remains hypersensitive to sound - primarily cat grooming himself    Session focused  upon rental property they were interested in was a 'scam' as they were asking for personal info prior to BODØ even seeing the property  Found a house being rented in [place] but the are not sure if they are accepting pets  BODØ discussed via chat as Jude Rommel was in the apartment  Jude Carney feels it's not fair to him that he is always taking care of BODØ  BODØ expressed feeling 'embarassed'  Mom wants BODØ to move back home  PTSD symptoms remains prevalent and this will be a big risk  Dad recently texted BODØ asking why she doesn't let him in her life to help her  BODØ responded to dad that he feels he is doing nothing wrong  BODØ expressed hurt and dad became defensive  BODØ feels 'she has nothing'- very upset - we stopped to practice breathing exercises  Successful  Denied SI    A: BODØ presented with liable mood, tearfulness and blunted affect  BODØ clearly became became irritable during a sentence - when asked, VAL stated she was trying to ignore the cat licking  Dad texted very hurtful comments triggering PTSD symptoms and panic attack a few days back  P: Continue individual therapy    HPI     Past Medical History:   Diagnosis Date    Abdominal pain     Anxiety     Anxiety and depression     Depression     Diabetes ([facility] [address] )     type 1    Diabetes mellitus ([facility] [address] ) 1/17/2019    Eating disorder     history of anorexia/bulemia [id number]    Fracture of fibula     R Salter I    Hashimoto's disease 2/21/2020    Nasal congestion     Obsessive-compulsive disorder     PTSD (post-traumatic stress disorder)     Rectal bleed        Past Surgical History:   Procedure Laterality Date    NASAL SEPTOPLASTY W/ TURBINOPLASTY      WISDOM TOOTH EXTRACTION      WRIST SURGERY      left;  Excision of ganglion       Current Outpatient Medications   Medication Sig Dispense Refill    albuterol (PROVENTIL HFA,VENTOLIN HFA) 90 mcg/act inhaler Inhale 1 puff every 6 (six) hours as needed for wheezing or shortness of breath 1 Inhaler 5    Blood Glucose Monitoring Suppl (Yaron Montalvo) w/Device KIT Use as directed  0    cholecalciferol (VITAMIN D3) 1,000 units tablet Take 4,000 Units by mouth daily      EPINEPHrine (EPIPEN) 0 3 mg/0 3 mL SOAJ Inject 0 3 mL (0 3 mg total) into a muscle once for 1 dose 0 6 mL 0    gabapentin (NEURONTIN) 100 mg capsule TAKE THREE CAPSULES BY MOUTH EVERY DAY AT BEDTIME 90 capsule 4    glucagon (GLUCAGON EMERGENCY) 1 MG injection Inject 1 mg under the skin once as needed for low blood sugar for up to 2 doses 1 kit 1    insulin aspart (NovoLOG) 100 Units/mL injection pen Inject 3 Units under the skin 3 (three) times a day with meals 5 pen 1    insulin aspart (NovoLOG) 100 units/mL injection Use 30 units per day via pump Disp 1 vial per month 30 mL 1    insulin degludec Devonda Furry FlexTouch) 100 units/mL injection pen Inject 35 Units under the skin daily at bedtime 8 units daily 5 pen 1    Insulin Pen Needle (BD PEN NEEDLE NASIM U/F) 32G X 4 MM MISC by Does not apply route 4 (four) times a day for 180 days 400 each 3    LORazepam (ATIVAN) 0 5 mg tablet Take 1 tablet (0 5 mg total) by mouth 2 (two) times a day as needed for anxiety 14 tablet 1    Multiple Vitamin (DAILY VALUE MULTIVITAMIN) TABS Take by mouth      ONETOUCH DELICA LANCETS 90B MISC Patient test 6 times daily 600 each 1    ONETOUCH VERIO test strip Test six times a day 600 each 3     No current facility-administered medications for this visit  Allergies   Allergen Reactions    Insulin Glargine     Iodides Throat Swelling     Reaction Date: 28Jul2014; Action Taken: none; Category: Allergy;     Iodine          I spent 50 minutes directly with the patient during this visit      VIRTUAL VISIT DISCLAIMER    Axel Islas acknowledges that she has consented to an online visit or consultation   She understands that the online visit is based solely on information provided by her, and that, in the absence of a face-to-face physical evaluation by the physician, the diagnosis she receives is both limited and provisional in terms of accuracy and completeness  This is not intended to replace a full medical face-to-face evaluation by the physician  Maty Mirza understands and accepts these terms

## 2020-08-05 ENCOUNTER — TELEMEDICINE (OUTPATIENT)
Dept: BEHAVIORAL/MENTAL HEALTH CLINIC | Facility: CLINIC | Age: 25
End: 2020-08-05
Payer: COMMERCIAL

## 2020-08-05 ENCOUNTER — HOSPITAL ENCOUNTER (EMERGENCY)
Facility: HOSPITAL | Age: 25
Discharge: HOME | End: 2020-08-06
Attending: EMERGENCY MEDICINE
Payer: COMMERCIAL

## 2020-08-05 DIAGNOSIS — F43.12 CHRONIC POST-TRAUMATIC STRESS DISORDER (PTSD): ICD-10-CM

## 2020-08-05 DIAGNOSIS — F42.2 MIXED OBSESSIONAL THOUGHTS AND ACTS: ICD-10-CM

## 2020-08-05 DIAGNOSIS — F41.0 GENERALIZED ANXIETY DISORDER WITH PANIC ATTACKS: Primary | ICD-10-CM

## 2020-08-05 DIAGNOSIS — F32.A DEPRESSION: ICD-10-CM

## 2020-08-05 DIAGNOSIS — F41.1 GENERALIZED ANXIETY DISORDER WITH PANIC ATTACKS: Primary | ICD-10-CM

## 2020-08-05 DIAGNOSIS — F32.A DEPRESSED: Primary | ICD-10-CM

## 2020-08-05 DIAGNOSIS — F41.9 ANXIETY: ICD-10-CM

## 2020-08-05 DIAGNOSIS — F33.2 SEVERE EPISODE OF RECURRENT MAJOR DEPRESSIVE DISORDER, WITHOUT PSYCHOTIC FEATURES (HCC): ICD-10-CM

## 2020-08-05 LAB
ETHANOL EXG-MCNC: 0 MG/DL
EXT PREG TEST URINE: NEGATIVE
EXT. CONTROL ED NAV: NORMAL
SARS-COV-2 RNA RESP QL NAA+PROBE: NEGATIVE

## 2020-08-05 PROCEDURE — 80307 DRUG TEST PRSMV CHEM ANLYZR: CPT | Performed by: EMERGENCY MEDICINE

## 2020-08-05 PROCEDURE — 99285 EMERGENCY DEPT VISIT HI MDM: CPT

## 2020-08-05 PROCEDURE — 87635 SARS-COV-2 COVID-19 AMP PRB: CPT | Performed by: EMERGENCY MEDICINE

## 2020-08-05 PROCEDURE — 90834 PSYTX W PT 45 MINUTES: CPT | Performed by: SOCIAL WORKER

## 2020-08-05 PROCEDURE — 81025 URINE PREGNANCY TEST: CPT

## 2020-08-05 PROCEDURE — 82075 ASSAY OF BREATH ETHANOL: CPT | Performed by: EMERGENCY MEDICINE

## 2020-08-05 PROCEDURE — 99284 EMERGENCY DEPT VISIT MOD MDM: CPT | Performed by: EMERGENCY MEDICINE
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
## 2020-08-06 VITALS
TEMPERATURE: 98.4 F | DIASTOLIC BLOOD PRESSURE: 65 MMHG | HEART RATE: 84 BPM | SYSTOLIC BLOOD PRESSURE: 124 MMHG | RESPIRATION RATE: 16 BRPM | OXYGEN SATURATION: 99 %

## 2020-08-06 LAB
AMPHETAMINES SERPL QL SCN: NEGATIVE
BARBITURATES UR QL: NEGATIVE
BENZODIAZ UR QL: NEGATIVE
COCAINE UR QL: NEGATIVE
METHADONE UR QL: NEGATIVE
OPIATES UR QL SCN: NEGATIVE
OXYCODONE+OXYMORPHONE UR QL SCN: NEGATIVE
PCP UR QL: NEGATIVE
THC UR QL: NEGATIVE

## 2020-08-06 RX ORDER — LORAZEPAM 2 MG/ML
2 INJECTION INTRAMUSCULAR EVERY 6 HOURS PRN
Status: CANCELLED | OUTPATIENT
Start: 2020-08-06

## 2020-08-06 RX ORDER — HALOPERIDOL 5 MG
5 TABLET ORAL EVERY 8 HOURS PRN
Status: CANCELLED | OUTPATIENT
Start: 2020-08-06

## 2020-08-06 RX ORDER — TRAZODONE HYDROCHLORIDE 50 MG/1
50 TABLET ORAL
Status: CANCELLED | OUTPATIENT
Start: 2020-08-06

## 2020-08-06 RX ORDER — BENZTROPINE MESYLATE 0.5 MG/1
2 TABLET ORAL EVERY 6 HOURS PRN
Status: CANCELLED | OUTPATIENT
Start: 2020-08-06

## 2020-08-06 RX ORDER — IBUPROFEN 600 MG/1
600 TABLET ORAL EVERY 8 HOURS PRN
Status: CANCELLED | OUTPATIENT
Start: 2020-08-06

## 2020-08-06 RX ORDER — LORAZEPAM 1 MG/1
1 TABLET ORAL EVERY 8 HOURS PRN
Status: CANCELLED | OUTPATIENT
Start: 2020-08-06

## 2020-08-06 RX ORDER — BENZTROPINE MESYLATE 1 MG/ML
2 INJECTION INTRAMUSCULAR; INTRAVENOUS EVERY 6 HOURS PRN
Status: CANCELLED | OUTPATIENT
Start: 2020-08-06

## 2020-08-06 RX ORDER — HALOPERIDOL 5 MG/ML
5 INJECTION INTRAMUSCULAR EVERY 6 HOURS PRN
Status: CANCELLED | OUTPATIENT
Start: 2020-08-06

## 2020-08-06 RX ORDER — MAGNESIUM HYDROXIDE/ALUMINUM HYDROXICE/SIMETHICONE 120; 1200; 1200 MG/30ML; MG/30ML; MG/30ML
30 SUSPENSION ORAL EVERY 4 HOURS PRN
Status: CANCELLED | OUTPATIENT
Start: 2020-08-06

## 2020-08-06 NOTE — ED NOTES
Patient is resting comfortably  Pt states her pump reads that her glucose is 190 and refuses POCT glucose        Yoly Vanegas RN  08/05/20 7908

## 2020-08-06 NOTE — ED NOTES
Pt eating lunch, asking to leave and has outpatient services in place from outreach program       Isaac Barron, ALYCIA  08/06/20 2445

## 2020-08-06 NOTE — EMTALA/ACUTE CARE TRANSFER
[facility] EMERGENCY DEPARTMENT  [address] [facility] [address]  Dept: [phone]      RDDDIN TRANSFER CONSENT    NAME Dionne LINDA 1995                              MRN [id number]    I have been informed of my rights regarding examination, treatment, and transfer   by Dr Abdulkadir Aceves MD    Benefits:      Risks:        Transfer Request   I acknowledge that my medical condition has been evaluated and explained to me by the emergency department physician or other qualified medical person and/or my attending physician who has recommended and offered to me further medical examination and treatment  I understand the Hospital's obligation with respect to the treatment and stabilization of my emergency medical condition  I nevertheless request to be transferred  I release the Hospital, the doctor, and any other persons caring for me from all responsibility or liability for any injury or ill effects that may result from my transfer and agree to accept all responsibility for the consequences of my choice to transfer, rather than receive stabilizing treatment at the Hospital  I understand that because the transfer is my request, my insurance may not provide reimbursement for the services  The Hospital will assist and direct me and my family in how to make arrangements for transfer, but the hospital is not liable for any fees charged by the transport service  In spite of this understanding, I refuse to consent to further medical examination and treatment which has been offered to me, and request transfer to    I authorize the performance of emergency medical procedures and treatments upon me in both transit and upon arrival at the receiving facility  Additionally, I authorize the release of any and all medical records to the receiving facility and request they be transported with me, if possible      I authorize the performance of emergency medical procedures and treatments upon me in both transit and upon arrival at the receiving facility  Additionally, I authorize the release of any and all medical records to the receiving facility and request they be transported with me, if possible  I understand that the safest mode of transportation during a medical emergency is an ambulance and that the Hospital advocates the use of this mode of transport  Risks of traveling to the receiving facility by car, including absence of medical control, life sustaining equipment, such as oxygen, and medical personnel has been explained to me and I fully understand them  (CHELSY CORRECT BOX BELOW)  [  ]  I consent to the stated transfer and to be transported by ambulance/helicopter  [  ]  I consent to the stated transfer, but refuse transportation by ambulance and accept full responsibility for my transportation by car  I understand the risks of non-ambulance transfers and I exonerate the Hospital and its staff from any deterioration in my condition that results from this refusal     X___________________________________________    DATE  20  TIME________  Signature of patient or legally responsible individual signing on patient behalf           RELATIONSHIP TO PATIENT_________________________          Provider Certification    NAME Lillie Bamberger                                        [facility] 1995                              MRN [id number]    A medical screening exam was performed on the above named patient  Based on the examination:    Condition Necessitating Transfer The encounter diagnosis was Depressed      Patient Condition:      Reason for Transfer:      Transfer Requirements: Facility     · Space available and qualified personnel available for treatment as acknowledged by    · Agreed to accept transfer and to provide appropriate medical treatment as acknowledged by          · Appropriate medical records of the examination and treatment of the patient are provided at the time of transfer   [address] [facility] _______  · Transfer will be performed by qualified personnel from    and appropriate transfer equipment as required, including the use of necessary and appropriate life support measures  Provider Certification: I have examined the patient and explained the following risks and benefits of being transferred/refusing transfer to the patient/family:         Based on these reasonable risks and benefits to the patient and/or the unborn child(humaira), and based upon the information available at the time of the patients examination, I certify that the medical benefits reasonably to be expected from the provision of appropriate medical treatments at another medical facility outweigh the increasing risks, if any, to the individuals medical condition, and in the case of labor to the unborn child, from effecting the transfer      X____________________________________________ DATE 08/06/20        TIME_______      ORIGINAL - SEND TO MEDICAL RECORDS   COPY - SEND WITH PATIENT DURING TRANSFER

## 2020-08-06 NOTE — ED NOTES
Insurance Authorization for admission:   Phone call placed to [employer] number: [phone]  Spoke to [address] [place] [address]    2 days approved  Level of care: 201 inpatient mental health treatment  Review on 8/7/2020     Authorization # call for auth upon arrival

## 2020-08-06 NOTE — ED NOTES
[facility] Ole[facility]in[facility] REENA  08/05/20   [id number]

## 2020-08-06 NOTE — ED NOTES
Patient reported a blood glucose of 82 via insulin pump and requested juice and a snack  Patient given 4oz apple juice and 4 peanut butter cookies          Estrella Ortiz RN  08/06/20 0207
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
## 2020-08-06 NOTE — ED NOTES
Transport canceled with [facility] by this writer at request on unit due to concerns with medical issues/equipment  Will follow up with unit and crisis worker in regards to patient

## 2020-08-06 NOTE — EMTALA/ACUTE CARE TRANSFER
[facility] EMERGENCY DEPARTMENT  [address]  [facility] [address]  Dept: [phone]      YLPZBV TRANSFER CONSENT    NAME Lillie Bamberger                                         1995                              MRN [id number]    I have been informed of my rights regarding examination, treatment, and transfer   by Dr Addison Curtis MD    Benefits: Specialized equipment and/or services available at the receiving facility (Include comment)________________________(inpatient psych unit)    Risks: Potential for delay in receiving treatment, Possible worsening of condition or death during transfer, Potential deterioration of medical condition, Increased discomfort during transfer      Consent for Transfer:  I acknowledge that my medical condition has been evaluated and explained to me by the emergency department physician or other qualified medical person and/or my attending physician, who has recommended that I be transferred to the service of  Accepting Physician: [address] [place] [address] at [address] [place] [address] Rd Name, [address]ish[address]je[address] carlos [address] : [address]  The above potential benefits of such transfer, the potential risks associated with such transfer, and the probable risks of not being transferred have been explained to me, and I fully understand them  The doctor has explained that, in my case, the benefits of transfer outweigh the risks  I agree to be transferred  I authorize the performance of emergency medical procedures and treatments upon me in both transit and upon arrival at the receiving facility  Additionally, I authorize the release of any and all medical records to the receiving facility and request they be transported with me, if possible  I understand that the safest mode of transportation during a medical emergency is an ambulance and that the Hospital advocates the use of this mode of transport   Risks of traveling to the receiving facility by car, including absence of medical control, life sustaining equipment, such as oxygen, and medical personnel has been explained to me and I fully understand them  (CHELSY CORRECT BOX BELOW)  [  ]  I consent to the stated transfer and to be transported by ambulance/helicopter  [  ]  I consent to the stated transfer, but refuse transportation by ambulance and accept full responsibility for my transportation by car  I understand the risks of non-ambulance transfers and I exonerate the Hospital and its staff from any deterioration in my condition that results from this refusal     X___________________________________________    DATE  20  TIME________  Signature of patient or legally responsible individual signing on patient behalf           RELATIONSHIP TO PATIENT_________________________          Provider Certification    NAME Lillie Bamberger                                         1995                              MRN [id number]    A medical screening exam was performed on the above named patient  Based on the examination:    Condition Necessitating Transfer The primary encounter diagnosis was Depressed  A diagnosis of Depression was also pertinent to this visit      Patient Condition: The patient has been stabilized such that within reasonable medical probability, no material deterioration of the patient condition or the condition of the unborn child(humaira) is likely to result from the transfer    Reason for Transfer: Level of Care needed not available at this facility    Transfer Requirements: [facility]   · Space available and qualified personnel available for treatment as acknowledged by Michael Ha   · Agreed to accept transfer and to provide appropriate medical treatment as acknowledged by       DENICE Carson  · Appropriate medical records of the examination and treatment of the patient are provided at the time of transfer   [address], [address] _______  · Transfer will be performed by qualified personnel from [id number]  and appropriate transfer equipment as required, including the use of necessary and appropriate life support measures  Provider Certification: I have examined the patient and explained the following risks and benefits of being transferred/refusing transfer to the patient/family:  General risk, such as traffic hazards, adverse weather conditions, rough terrain or turbulence, possible failure of equipment (including vehicle or aircraft), or consequences of actions of persons outside the control of the transport personnel, Unanticipated needs of medical equipment and personnel during transport, Risk of worsening condition, The possibility of a transport vehicle being unavailable, The patient is stable for psychiatric transfer because they are medically stable, and is protected from harming him/herself or others during transport      Based on these reasonable risks and benefits to the patient and/or the unborn child(humaira), and based upon the information available at the time of the patients examination, I certify that the medical benefits reasonably to be expected from the provision of appropriate medical treatments at another medical facility outweigh the increasing risks, if any, to the individuals medical condition, and in the case of labor to the unborn child, from effecting the transfer      X____________________________________________ DATE 08/06/20        TIME_______      ORIGINAL - SEND TO MEDICAL RECORDS   COPY - SEND WITH PATIENT DURING TRANSFER

## 2020-08-06 NOTE — ED NOTES
Per EVS, patient's MA coverage is active through [facility]       Shalonda Kan, REENA  08/05/20   [phone]
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
## 2020-08-06 NOTE — ED NOTES
Patient is accepted at [facility]  Patient is accepted by Dr Leyda Magana per Meka Edmond at [facility]          Nurse report is to be called to [phone] prior to patient transfer

## 2020-08-06 NOTE — ED NOTES
Call placed to [employer] [phone], spoke with Cristophre Capps  Transportation is arranged with [employer] is scheduled for 0845  Patient may go to the floor after 0730        Nurse report is to be called to [phone] prior to patient transfer

## 2020-08-06 NOTE — ED NOTES
Patient requested  and cable for personal phone  Patient given  and cable to be returned upon discharge to other facility        Herrera Lopez RN  08/06/20 8431

## 2020-08-06 NOTE — ED CARE HANDOFF
Emergency Department Sign Out Note        Sign out and transfer of care from Dr Moreno Tay  See Separate Emergency Department note  The patient, Yevgeniy Hansen, was evaluated by the previous provider for depression  Workup Completed:  Labs Reviewed   NOVEL CORONAVIRUS (COVID-19), PCR [facility] - Normal       Result Value Ref Range Status    SARS-CoV-2 Negative  Negative Final    Narrative: The specimen collection materials, transport medium, and/or testing methodology utilized in the production of these test results have been proven to be reliable in a limited validation with an abbreviated program under the Emergency Utilization Authorization provided by the FDA  Testing reported as "Presumptive positive" will be confirmed with secondary testing with a reference laboratory to ensure result accuracy  Clinical caution and judgement should be used with the interpretation of these results with consideration of the clinical impression and other laboratory testing  Testing reported as "Positive" or "Negative" has been proven to be accurate according to standard laboratory validation requirements  All testing is performed with control materials showing appropriate reactivity at standard intervals  POCT ALCOHOL BREATH TEST - Normal    EXTBreath Alcohol 0 000   Final   POCT PREGNANCY, URINE - Normal    EXT PREG TEST UR (Ref: Negative) negative   Final    Control valid   Final   RAPID DRUG SCREEN, URINE        ED Course / Workup Pending (followup):                            ED Course as of Aug 06 0531   Wed Aug 05, 2020   2223 Pt signed out from Dr Moreno Tay  Medically cleared  201 signed  No SI/HI  [facility]  Thu Aug 06, 2020   0530 Accepted [facility]  8:45 AM pickup          Procedures  MDM  Number of Diagnoses or Management Options  Depression: new and requires workup     Amount and/or Complexity of Data Reviewed  Clinical lab tests: reviewed  Tests in the medicine section of CPT®: reviewed  Discussion of test results with the performing providers: yes    Risk of Complications, Morbidity, and/or Mortality  Presenting problems: low  Diagnostic procedures: low  Management options: low    Patient Progress  Patient progress: stable      Disposition  Final diagnoses:   Depression     Time reflects when diagnosis was documented in both MDM as applicable and the Disposition within this note     Time User Action Codes Description Comment    8/6/2020  2:03 AM Ayanna Werners Add [F32 9] Depressed     8/6/2020  5:30 AM Isaiah Santiago Add [F32 9] Depression       ED Disposition     ED Disposition Condition Date/Time Comment    Transfer to [facility] Aug 6, 2020  5:29 AM Ryland Copeland should be transferred out to [facility] and has been medically cleared  MD Documentation      Most Recent Value   Sending MD Dr Jacinda Kirkpatrick    None       Patient's Medications   Discharge Prescriptions    No medications on file     No discharge procedures on file         ED Provider  Electronically Signed by     Meryl Marmolejo MD  08/06/20 9191

## 2020-08-07 ENCOUNTER — OFFICE VISIT (OUTPATIENT)
Dept: PSYCHOLOGY | Facility: CLINIC | Age: 25
End: 2020-08-07
Payer: COMMERCIAL

## 2020-08-07 ENCOUNTER — OFFICE VISIT (OUTPATIENT)
Dept: PSYCHIATRY | Facility: CLINIC | Age: 25
End: 2020-08-07
Payer: COMMERCIAL

## 2020-08-07 VITALS
SYSTOLIC BLOOD PRESSURE: 121 MMHG | RESPIRATION RATE: 16 BRPM | DIASTOLIC BLOOD PRESSURE: 81 MMHG | HEIGHT: 61 IN | WEIGHT: 142 LBS | HEART RATE: 100 BPM | TEMPERATURE: 97.6 F | BODY MASS INDEX: 26.81 KG/M2

## 2020-08-07 DIAGNOSIS — F42.2 MIXED OBSESSIONAL THOUGHTS AND ACTS: ICD-10-CM

## 2020-08-07 DIAGNOSIS — F43.12 CHRONIC POST-TRAUMATIC STRESS DISORDER (PTSD): ICD-10-CM

## 2020-08-07 DIAGNOSIS — F33.2 SEVERE EPISODE OF RECURRENT MAJOR DEPRESSIVE DISORDER, WITHOUT PSYCHOTIC FEATURES (HCC): ICD-10-CM

## 2020-08-07 DIAGNOSIS — F41.0 GENERALIZED ANXIETY DISORDER WITH PANIC ATTACKS: Primary | ICD-10-CM

## 2020-08-07 DIAGNOSIS — F41.1 GENERALIZED ANXIETY DISORDER WITH PANIC ATTACKS: Primary | ICD-10-CM

## 2020-08-07 PROCEDURE — 90791 PSYCH DIAGNOSTIC EVALUATION: CPT

## 2020-08-07 PROCEDURE — 90791 PSYCH DIAGNOSTIC EVALUATION: CPT | Performed by: PSYCHIATRY & NEUROLOGY

## 2020-08-07 PROCEDURE — 3008F BODY MASS INDEX DOCD: CPT | Performed by: NURSE PRACTITIONER

## 2020-08-07 RX ORDER — ESCITALOPRAM OXALATE 5 MG/1
5 TABLET ORAL DAILY
Qty: 30 TABLET | Refills: 0 | Status: SHIPPED | OUTPATIENT
Start: 2020-08-07 | End: 2020-08-19

## 2020-08-07 RX ORDER — CLONAZEPAM 0.5 MG/1
0.5 TABLET ORAL 2 TIMES DAILY PRN
Qty: 30 TABLET | Refills: 1 | Status: SHIPPED | OUTPATIENT
Start: 2020-08-07 | End: 2020-10-16 | Stop reason: SDUPTHER

## 2020-08-07 NOTE — PSYCH
PHQ-9 Depression Screening    PHQ-9:    Frequency of the following problems over the past two weeks:       Little interest or pleasure in doing things:  3 - nearly every day  Feeling down, depressed, or hopeless:  3 - nearly every day  Trouble falling or staying asleep, or sleeping too much:  3 - nearly every day  Feeling tired or having little energy:  3 - nearly every day  Poor appetite or overeating:  3 - nearly every day  Feeling bad about yourself - or that you are a failure or have let yourself or your family down:  2 - more than half the days  Trouble concentrating on things, such as reading the newspaper or watching television:  3 - nearly every day  Moving or speaking so slowly that other people could have noticed   Or the opposite - being so fidgety or restless that you have been moving around a lot more than usual:  2 - more than half the days  Thoughts that you would be better off dead, or of hurting yourself in some way:  0 - not at all  PHQ-2 Score:  6  PHQ-9 Score:  22
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
## 2020-08-07 NOTE — PSYCH
Assessment/Plan:      Diagnoses and all orders for this visit:    Generalized anxiety disorder with panic attacks    Chronic post-traumatic stress disorder (PTSD)    Severe episode of recurrent major depressive disorder, without psychotic features ([facility] )    Mixed obsessional thoughts and acts          Subjective:     Patient ID: Elin Hines is a 23 y o  female  [facility] Treatment Plan   AREAS OF NEED: Depression, anxiety, and obsessional thoughts as evidenced by patient complaints, too much, too little sleep, difficulty socializing, poor hygiene, as related to poor physical health, poor social supports and poor feelings of self worth  Date Initiated: 08/07/20      Strengths: Trying to get better, self-discovery       LONG TERM GOAL:   Date Initiated: 08/07/20  1 0 I will identify and share three ways in which my day-to-day functioning has improved since attending program   Target Date: 9/4/2020  Completion Date:         SHORT TERM OBJECTIVES:     Date Initiated: 08/07/20  1 1 I will learn three new copings skills to cope with depression and anxiety symptoms and practice at least one on a daily basis  Revision Date:   Target Date: 8/18/2020  Completion Date:     Date Initiated: 08/07/20  1 2 I will make a short and simple to do lists and complete three tasks each day  Revision Date:   Target Date: 8/18/2020  Completion Date:    Date Initiated: 08/07/20  1 3 I will take medications as prescribed and share questions and concerns if arise  Revision Date:  Target Date: 8/18/2020  Completion Date:     Date Initiated: 08/07/20  1 4 I will identify 3 ways my supports can assist in my recovery and agree to staff/support contact as indicated  Revision Date:  Target Date: 8/18/2020  Completion Date:             7 DAY REVISION:    Date Initiated:  Revision Date:   Target Date:   Completion Date:      PSYCHIATRY:  Date Initiated:  08/07/20  Medication Management and Education       Revision Date:    The person(s) responsible for carrying out the plan is Karolina Marcum MD    NURSING:   Date Initiated: 08/07/20  1 1,1 2,1 3,1 4 This RN will provide daily wellness group five days weekly to educate Reji Pozo on S/S of her diagnoses and medications used in treatment  Revision Date:  The person(s) responsible for carrying out the plan is Neo Whitt RN    PSYCHOLOGY:   Date Initiated: 08/07/20       1 1, 1 2, 1 4 Provide psychotherapy group 5 times per week to allow opportunity for Reji Pozo  to explore stressors and ways of coping  Revision Date:   The person(s) responsible for carrying out the plan is Krys Abdi [place]    ALLIED THERAPY:   Date Initiated: 08/07/20  1 1,1 2 Engage Reji Pozo in AT group 5 times daily to encourage development and use of wellness tools to decrease symptoms and promote recovery through meaningful activity  Revision Date:   The person(s) responsible for carrying out the plan is CHAY Goodman     CASE MANAGEMENT:   Date Initiated: 08/07/20      1 0 This  will meet with Herbbonifacioamanda Pozo  3-4 times weekly to assess treatment progress, discharge planning, connection to community supports and UR as indicated  Revision Date:   The person(s) responsible for carrying out the plan is Neo Whitt RN      TREATMENT REVIEW/COMMENTS:     DISCHARGE CRITERIA: Identify 3 signs of progress and complete relapse prevention plan  DISCHARGE PLAN: Return to OP  Estimated Length of Stay: 10 treatment days       Diagnosis and Treatment Plan explained to Abiel Collado relates understanding diagnosis and is agreeable to Treatment Plan           CLIENT COMMENTS / Please share your thoughts, feelings, need and/or experiences regarding your treatment plan: _____________________________________________________________________________________________________________________________________________________________________________________________________________________________________________________________________________________________________________________ Date/Time: ______________     Patient Signature: _________________________________     Date/Time: ______________      Signature: _________________________________     Date/Time: ______________

## 2020-08-07 NOTE — PSYCH
Case Management Note    Shruti Dickinson RN    Current suicide risk : Low     ([id number]) CM engaged with Lee Edwards   Reviewed program structure changes, expectations and was given on call number and crisis phone numbers via verbally  Lee Edwards completed necessary releases and OP providers/ PCP notified of admission and health care coordination form completed if requested by client  Completed initial psycho-social evaluation and initial treatment goals discussed  Developed treatment plan collaboratively with client  Reviewed virtual structure and discussed changes in program dynamic as well as guidelines implemented for optimal success  Discussed verbal consents for treatment and ROIs and overall expectations for the virtual platform  Reviewed any technological questions they had to ensure group participation the following treatment day  Medications changes/added/denied? Yes     Treatment session number: 0 - assessment only    Individual Case Management Visit provided today?  Yes     [facility] follow up physician's orders: Mamie Morales
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
## 2020-08-10 ENCOUNTER — OFFICE VISIT (OUTPATIENT)
Dept: PSYCHOLOGY | Facility: CLINIC | Age: 25
End: 2020-08-10
Payer: COMMERCIAL

## 2020-08-10 DIAGNOSIS — F33.2 SEVERE EPISODE OF RECURRENT MAJOR DEPRESSIVE DISORDER, WITHOUT PSYCHOTIC FEATURES (HCC): ICD-10-CM

## 2020-08-10 DIAGNOSIS — F43.12 CHRONIC POST-TRAUMATIC STRESS DISORDER (PTSD): ICD-10-CM

## 2020-08-10 DIAGNOSIS — F41.0 GENERALIZED ANXIETY DISORDER WITH PANIC ATTACKS: Primary | ICD-10-CM

## 2020-08-10 DIAGNOSIS — F42.2 MIXED OBSESSIONAL THOUGHTS AND ACTS: ICD-10-CM

## 2020-08-10 DIAGNOSIS — F41.1 GENERALIZED ANXIETY DISORDER WITH PANIC ATTACKS: Primary | ICD-10-CM

## 2020-08-10 PROCEDURE — 90832 PSYTX W PT 30 MINUTES: CPT

## 2020-08-10 PROCEDURE — H0035 MH PARTIAL HOSP TX UNDER 24H: HCPCS

## 2020-08-10 NOTE — PSYCH
Virtual Regular Visit      Assessment/Plan:    Problem List Items Addressed This Visit        Other    Chronic post-traumatic stress disorder (PTSD)    OCD (obsessive compulsive disorder)    Severe episode of recurrent major depressive disorder, without psychotic features ([address] )    Generalized anxiety disorder with panic attacks - Primary               Reason for visit is VIRTUAL PHP GROUP DUE TO COVID-19  Encounter provider [facility]    Provider located at [address] [place] [id number]      Recent Visits  Date Type Provider Dept   08/07/20 Office Visit [facility]   Showing recent visits within past 7 days and meeting all other requirements     Future Appointments  No visits were found meeting these conditions  Showing future appointments within next 150 days and meeting all other requirements        The patient was identified by name and date of birth  Raymundo Lockett was informed that this is a telemedicine visit and that the visit is being conducted through [employer] [employer]  My office door was closed  No one else was in the room  She acknowledged consent and understanding of privacy and security of the video platform  The patient has agreed to participate and understands they can discontinue the visit at any time  Patient is aware this is a billable service       Subjective  Raymundo Lockett is a 23 y o  female        HPI     Past Medical History:   Diagnosis Date    Abdominal pain     Anxiety     Anxiety and depression     Depression     Diabetes ([facility] )     type 1    Diabetes mellitus ([facility] ) 1/17/2019    Eating disorder     history of anorexia/bulemia [id number]    Fracture of fibula     R Salter I    Hashimoto's disease 2/21/2020    Head injury     Nasal congestion     Obsessive-compulsive disorder     PTSD (post-traumatic stress disorder)     Rectal bleed        Past Surgical History: Procedure Laterality Date    KNEE SURGERY Right 07/06/2020    NASAL SEPTOPLASTY W/ TURBINOPLASTY      WISDOM TOOTH EXTRACTION      WRIST SURGERY      left;  Excision of ganglion       Current Outpatient Medications   Medication Sig Dispense Refill    albuterol (PROVENTIL HFA,VENTOLIN HFA) 90 mcg/act inhaler Inhale 1 puff every 6 (six) hours as needed for wheezing or shortness of breath 1 Inhaler 5    Blood Glucose Monitoring Suppl (ONETOUCH VERIO) w/Device KIT Use as directed  0    cholecalciferol (VITAMIN D3) 1,000 units tablet Take 4,000 Units by mouth daily      clonazePAM (KlonoPIN) 0 5 mg tablet Take 1 tablet (0 5 mg total) by mouth 2 (two) times a day as needed for anxiety 30 tablet 1    EPINEPHrine (EPIPEN) 0 3 mg/0 3 mL SOAJ Inject 0 3 mL (0 3 mg total) into a muscle once for 1 dose 0 6 mL 0    escitalopram (LEXAPRO) 5 mg tablet Take 1 tablet (5 mg total) by mouth daily 30 tablet 0    gabapentin (NEURONTIN) 100 mg capsule TAKE THREE CAPSULES BY MOUTH EVERY DAY AT BEDTIME 90 capsule 4    glucagon (GLUCAGON EMERGENCY) 1 MG injection Inject 1 mg under the skin once as needed for low blood sugar for up to 2 doses 1 kit 1    insulin aspart (NovoLOG) 100 Units/mL injection pen Inject 3 Units under the skin 3 (three) times a day with meals 5 pen 1    insulin aspart (NovoLOG) 100 units/mL injection Use 30 units per day via pump Disp 1 vial per month 30 mL 1    insulin degludec Magda Moh FlexTouch) 100 units/mL injection pen Inject 35 Units under the skin daily at bedtime 8 units daily (Patient not taking: Reported on 8/7/2020) 5 pen 1    Insulin Infusion Pump KIT 13 mm cannula, 23 inch tubing change site every 3 days      Insulin Pen Needle (BD PEN NEEDLE NASIM U/F) 32G X 4 MM MISC by Does not apply route 4 (four) times a day for 180 days 400 each 3    Levonorgestrel-Ethinyl Estrad (ALTAVERA PO) Take 1 tablet by mouth      Multiple Vitamin (DAILY VALUE MULTIVITAMIN) TABS Take by mouth      [address] [place] LANCETS 97G MISC Patient test 6 times daily 600 each 1    ONETOUCH VERIO test strip Test six times a day 600 each 3     No current facility-administered medications for this visit  Allergies   Allergen Reactions    Insulin Glargine      Burning and redness under skin    Iodides Throat Swelling and Swelling     Reaction Date: 28Jul2014; Action Taken: none; Category: Allergy; Reaction Date: 28Jul2014; Action Taken: none; Category: Allergy;     Iodine        Review of Systems    Video Exam    There were no vitals filed for this visit  Physical Exam     I spent FOUR GROUP HOURS PLUS CASE MANAGEMENT minutes with patient today in which greater than 50% of the time was spent in counseling/coordination of care regarding PHP - SEE NOTES  VIRTUAL VISIT DISCLAIMER    Maty Mirza acknowledges that she has consented to an online visit or consultation  She understands that the online visit is based solely on information provided by her, and that, in the absence of a face-to-face physical evaluation by the physician, the diagnosis she receives is both limited and provisional in terms of accuracy and completeness  This is not intended to replace a full medical face-to-face evaluation by the physician  Maty Mirza understands and accepts these terms

## 2020-08-10 NOTE — PSYCH
Subjective:     Patient ID: Yevgeniy Hansen is a 23 y o  female  [facility] Clinical Progress Notes      Specialized Services Documentation  Therapist must complete separate progress note for each specific clinical activity in which the individual participated during the day  Group Psychotherapy     ([id number]) Group was facilitated virtually in a private office using HIPAA Compliant and Approved Microsoft Teams  Yevgeniy Hansen  consented to the use of tele-video modality of treatment and was virtually present for group psychotherapy process today  Yevgeniy Hansen actively participated in psychoeducation group this afternoon which focused on medication education on antidepressants  Group was educated on commonly prescribed medications, their class, side effects, and the length of time to take effect  Group members expressed their concerns and asked questions regarding their currently prescribed medications  Patients were then provided with a questionnaire regarding their medications  Questionnaire was provided for each group member to self-reflect on their medications and how much they know regarding their medications  It also gave possible talking points to discuss during group  A crossword puzzle was utilized to reinforce teaching  Yevgeniy Hansen progress toward goal noted  Continue psychoeducation to further learn about prescribed medications and the importance of understanding their purpose  Tx Plan Objective: 1 3, Therapist:  Samia Geronimo RN    Case Management Note    Samia Geronimo RN    Current suicide risk : Low     ([id number]) Writer spoke with Yevgeniy Hansen  Reviewed treatment plan  Aditi Levine is very medication and side effect focused  Stated she has been on 4 other anti-depressants and had similar side effects to the one she is having with Lexapro currently  She takes lexapro with food at night and goes to sleep  In the morning she wakes up and vomits   She is not pregnant  She states she never vomits  States her mental health issues started when she began gabapentin  On gabapentin due to neuropathy  Writer recommended talking to the neurologist related to this to see if another medication would be more suited  Vanadna Ochoa then stated, "but I have a lot of anxiety too  I can't do anything some days because of it " Writer reminded Vandana Ochoa that she has PRN Klonopin  Vandana Ochoa has not taken the Klonopin because "ativan stopped working "     Medications changes/added/denied? No    Treatment session number: 1    Individual Case Management Visit provided today?  Yes     [facility] follow up physician's orders: na

## 2020-08-10 NOTE — PSYCH
Subjective:     Patient ID: Reji Pozo is a 23 y o  female  [facility] Clinical Progress Notes      Specialized Services Documentation  Therapist must complete separate progress note for each specific clinical activity in which the individual participated during the day  Group Psychotherapy (9:30am-10:15am)      Group was facilitated virtually in a private office using HIPAA Compliant and Approved Microsoft Teams  Jenny Rodrigues consented to the use of tele-video modality of treatment and was virtually present for psychotherapy group today  Group began with a discussion about coping skills and negative thinking and how negative thinking impacts ability to use coping skills as needed    Adriel Pelletier was attentive and engaged in group   She offered feedback regarding being new to the program and looking to gain additional insight into coping skills   She continues to make progress towards goals through participating in group psychotherapy and is encouraged to continue to progress towards long term goals           Tx Plan Objective: 1 1,1 2, Therapist:  Marita Jeffries, RADHA, LSW

## 2020-08-10 NOTE — PSYCH
Education Therapy  This group was facilitated virtually in a private office using HIPAA Compliant and Approved Microsoft Teams  Marylen Nails consented to the use of tele-video modality of treatment    Time:  7393-8216  Previous goal met: N/A   Readiness to Learning: Receptive  Barriers to Learning: None  Learning Assessment  Time: 0545-9519  Education Completed: Aftercare, Allied therapy, Wellness Tools and Psychotherapy   Teaching Method: Verbal, A/V and Demonstration  Shared Area of Learning: Yes   Goal Set: Yes, to get out of and spend time out of her room  Tx Plan Objective: 1 4, Therapist: Jessenia Sam, RADHA, LSW

## 2020-08-11 ENCOUNTER — OFFICE VISIT (OUTPATIENT)
Dept: PSYCHOLOGY | Facility: CLINIC | Age: 25
End: 2020-08-11
Payer: COMMERCIAL

## 2020-08-11 DIAGNOSIS — F41.1 GENERALIZED ANXIETY DISORDER WITH PANIC ATTACKS: Primary | ICD-10-CM

## 2020-08-11 DIAGNOSIS — F43.12 CHRONIC POST-TRAUMATIC STRESS DISORDER (PTSD): ICD-10-CM

## 2020-08-11 DIAGNOSIS — F33.2 SEVERE EPISODE OF RECURRENT MAJOR DEPRESSIVE DISORDER, WITHOUT PSYCHOTIC FEATURES (HCC): ICD-10-CM

## 2020-08-11 DIAGNOSIS — F41.0 GENERALIZED ANXIETY DISORDER WITH PANIC ATTACKS: Primary | ICD-10-CM

## 2020-08-11 DIAGNOSIS — F42.2 MIXED OBSESSIONAL THOUGHTS AND ACTS: ICD-10-CM

## 2020-08-11 PROCEDURE — 90832 PSYTX W PT 30 MINUTES: CPT

## 2020-08-11 PROCEDURE — H0035 MH PARTIAL HOSP TX UNDER 24H: HCPCS

## 2020-08-11 NOTE — PSYCH
Subjective:     Patient ID: Zenaida Canavan is a 23 y o  female  [facility] Clinical Progress Notes      Specialized Services Documentation  Therapist must complete separate progress note for each specific clinical activity in which the individual participated during the day  Group Psychotherapy     ([id number]) Group was facilitated virtually in a private office using HIPAA Compliant and Approved Microsoft Teams  Zenaida Canavan consented to the use of tele-video modality of treatment and was virtually present for group psychotherapy process today  Zenaida Canavan actively engaged in psychoeducational group about depression  The group viewed the first part of an educational video about depression called, Depression: Out of the Shadows   Video educated the group on signs and symptoms of depression, medications, and therapies  The first half of the video depicted medication as well as non medication treatment  Video as discuss the positive outcomes for those who seek help  Video and discussion afterward offered group members a chance to further explore and become educated on their own diagnosis  Good progress towards goals through engagement and participation  Continue psychoeducational groups to educate on diagnosis  Tx Plan Objective:  1 3, 1 4 Therapist:  Roland Romeo RN      Case Management Note    Roland Romeo RN    Current suicide risk : Low     (1:40-2:00) Spoke with BODØ who reported group going better today  Feels like she is getting a lot out of it  Writer spoke with doctor about Jenny's c/o nausea and vomiting of medication  BODØ informed that she has follow up tomorrow  Medications changes/added/denied? No    Treatment session number: 2    Individual Case Management Visit provided today?  Yes     [facility] follow up physician's orders:

## 2020-08-11 NOTE — PSYCH
Virtual Regular Visit      Assessment/Plan:    Problem List Items Addressed This Visit        Other    Chronic post-traumatic stress disorder (PTSD)    OCD (obsessive compulsive disorder)    Severe episode of recurrent major depressive disorder, without psychotic features ([facility] [address] )    Generalized anxiety disorder with panic attacks - Primary               Reason for visit is VIRTUAL PHP GROUP DUE TO COVID-19  Encounter provider [facility]    Provider located at [address] [place] [id number]      Recent Visits  Date Type Provider Dept   08/10/20 Office Visit [facility]   08/07/20 Office Visit [facility]   Showing recent visits within past 7 days and meeting all other requirements     Today's Visits  Date Type Provider Dept   08/11/20 Office Visit [facility]   Showing today's visits and meeting all other requirements     Future Appointments  No visits were found meeting these conditions  Showing future appointments within next 150 days and meeting all other requirements        The patient was identified by name and date of birth  Elin Hines was informed that this is a telemedicine visit and that the visit is being conducted through [employer]  My office door was closed  No one else was in the room  She acknowledged consent and understanding of privacy and security of the video platform  The patient has agreed to participate and understands they can discontinue the visit at any time  Patient is aware this is a billable service       Subjective  Elin Hines is a 23 y o  female         HPI     Past Medical History:   Diagnosis Date    Abdominal pain     Anxiety     Anxiety and depression     Depression     Diabetes ([facility] [address] )     type 1    [facility] 1/17/2019    Eating disorder     history of anorexia/bulemia [id number]  Fracture of fibula     R Salter I    Hashimoto's disease 2/21/2020    Head injury     Nasal congestion     Obsessive-compulsive disorder     PTSD (post-traumatic stress disorder)     Rectal bleed        Past Surgical History:   Procedure Laterality Date    KNEE SURGERY Right 07/06/2020    NASAL SEPTOPLASTY W/ TURBINOPLASTY      WISDOM TOOTH EXTRACTION      WRIST SURGERY      left;  Excision of ganglion       Current Outpatient Medications   Medication Sig Dispense Refill    albuterol (PROVENTIL HFA,VENTOLIN HFA) 90 mcg/act inhaler Inhale 1 puff every 6 (six) hours as needed for wheezing or shortness of breath 1 Inhaler 5    Blood Glucose Monitoring Suppl (ONETOUCH VERIO) w/Device KIT Use as directed  0    cholecalciferol (VITAMIN D3) 1,000 units tablet Take 4,000 Units by mouth daily      clonazePAM (KlonoPIN) 0 5 mg tablet Take 1 tablet (0 5 mg total) by mouth 2 (two) times a day as needed for anxiety 30 tablet 1    EPINEPHrine (EPIPEN) 0 3 mg/0 3 mL SOAJ Inject 0 3 mL (0 3 mg total) into a muscle once for 1 dose 0 6 mL 0    escitalopram (LEXAPRO) 5 mg tablet Take 1 tablet (5 mg total) by mouth daily 30 tablet 0    gabapentin (NEURONTIN) 100 mg capsule TAKE THREE CAPSULES BY MOUTH EVERY DAY AT BEDTIME 90 capsule 4    glucagon (GLUCAGON EMERGENCY) 1 MG injection Inject 1 mg under the skin once as needed for low blood sugar for up to 2 doses 1 kit 1    insulin aspart (NovoLOG) 100 Units/mL injection pen Inject 3 Units under the skin 3 (three) times a day with meals 5 pen 1    insulin aspart (NovoLOG) 100 units/mL injection Use 30 units per day via pump Disp 1 vial per month 30 mL 1    insulin degludec Ardell Keya FlexTouch) 100 units/mL injection pen Inject 35 Units under the skin daily at bedtime 8 units daily (Patient not taking: Reported on 8/7/2020) 5 pen 1    Insulin Infusion Pump KIT 13 mm cannula, 23 inch tubing change site every 3 days      Insulin Pen Needle (BD PEN NEEDLE NASIM U/F) 32G X 4 MM MISC by Does not apply route 4 (four) times a day for 180 days 400 each 3    Levonorgestrel-Ethinyl Estrad (ALTAVERA PO) Take 1 tablet by mouth      Multiple Vitamin (DAILY VALUE MULTIVITAMIN) TABS Take by mouth      ONETOUCH DELICA LANCETS 41Q MISC Patient test 6 times daily 600 each 1    ONETOUCH VERIO test strip Test six times a day 600 each 3     No current facility-administered medications for this visit  Allergies   Allergen Reactions    Insulin Glargine      Burning and redness under skin    Iodides Throat Swelling and Swelling     Reaction Date: 28Jul2014; Action Taken: none; Category: Allergy; Reaction Date: 28Jul2014; Action Taken: none; Category: Allergy;     Iodine        Review of Systems    Video Exam    There were no vitals filed for this visit  Physical Exam     I spent FOUR GROUP HOURS PLUS CASE MANAGEMENT minutes with patient today in which greater than 50% of the time was spent in counseling/coordination of care regarding PHP - SEE NOTES  VIRTUAL VISIT DISCLAIMER    Prieto Khan acknowledges that she has consented to an online visit or consultation  She understands that the online visit is based solely on information provided by her, and that, in the absence of a face-to-face physical evaluation by the physician, the diagnosis she receives is both limited and provisional in terms of accuracy and completeness  This is not intended to replace a full medical face-to-face evaluation by the physician  Prieto Khan understands and accepts these terms

## 2020-08-11 NOTE — PSYCH
Subjective:     Patient ID: Dionne Baker is a 23 y o  female  [facility] Clinical Progress Notes      Specialized Services Documentation  Therapist must complete separate progress note for each specific clinical activity in which the individual participated during the day  Group Psychotherapy ([id number])     Group was facilitated virtually in a private office using HIPAA Compliant and Approved [employer] Teams  Dionne Baker consented to the use of tele-video modality of treatment and was virtually present for psychotherapy group today  Clients reviewed briefly from yesterday and connected relationship maintenance to communication  Clients engaged in a discussion on different types of communication  Orfrankie Brandt was engaged and responsive in groups  She continues to make progress towards goals through participating in group psychotherapy and is encouraged to continue to progress towards long term goals  TX Plan Objectives: 1 1, 1 2   Therapist:  Fei Simeon MA, [place]        Education Therapy  This group was facilitated virtually in a private office using HIPAA Compliant and Approved [employer] Teams  Dionne Baker consented to the use of tele-video modality of treatment    Time:  4769-5262  Previous goal met: Yes   Readiness to Learning: Receptive  Barriers to Learning: None  Learning Assessment  Time: 1407-5032  Education Completed: Aftercare, Allied therapy, Wellness Tools and Psychotherapy   Teaching Method: Verbal, A/V and Demonstration  Shared Area of Learning: Yes   Goal Set: Practice assertiveness  Tx Plan Objective: 1 4, Therapist: Fei Simeon MA, [facility]
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
## 2020-08-12 ENCOUNTER — OFFICE VISIT (OUTPATIENT)
Dept: PSYCHOLOGY | Facility: CLINIC | Age: 25
End: 2020-08-12
Payer: COMMERCIAL

## 2020-08-12 ENCOUNTER — TELEMEDICINE (OUTPATIENT)
Dept: PSYCHIATRY | Facility: CLINIC | Age: 25
End: 2020-08-12
Payer: COMMERCIAL

## 2020-08-12 DIAGNOSIS — F41.0 GENERALIZED ANXIETY DISORDER WITH PANIC ATTACKS: ICD-10-CM

## 2020-08-12 DIAGNOSIS — F41.0 GENERALIZED ANXIETY DISORDER WITH PANIC ATTACKS: Primary | ICD-10-CM

## 2020-08-12 DIAGNOSIS — F42.2 MIXED OBSESSIONAL THOUGHTS AND ACTS: ICD-10-CM

## 2020-08-12 DIAGNOSIS — F43.12 CHRONIC POST-TRAUMATIC STRESS DISORDER (PTSD): ICD-10-CM

## 2020-08-12 DIAGNOSIS — F41.1 GENERALIZED ANXIETY DISORDER WITH PANIC ATTACKS: ICD-10-CM

## 2020-08-12 DIAGNOSIS — F33.2 SEVERE EPISODE OF RECURRENT MAJOR DEPRESSIVE DISORDER, WITHOUT PSYCHOTIC FEATURES (HCC): ICD-10-CM

## 2020-08-12 DIAGNOSIS — F43.12 CHRONIC POST-TRAUMATIC STRESS DISORDER (PTSD): Primary | ICD-10-CM

## 2020-08-12 DIAGNOSIS — F41.1 GENERALIZED ANXIETY DISORDER WITH PANIC ATTACKS: Primary | ICD-10-CM

## 2020-08-12 PROBLEM — F41.9 ANXIETY: Status: RESOLVED | Noted: 2020-06-19 | Resolved: 2020-08-12

## 2020-08-12 PROCEDURE — H0035 MH PARTIAL HOSP TX UNDER 24H: HCPCS

## 2020-08-12 PROCEDURE — 90832 PSYTX W PT 30 MINUTES: CPT

## 2020-08-12 PROCEDURE — 99213 OFFICE O/P EST LOW 20 MIN: CPT | Performed by: NURSE PRACTITIONER

## 2020-08-12 NOTE — PSYCH
Subjective:     Patient ID: Axel Islas is a 23 y o  female  [facility] Clinical Progress Notes      Specialized Services Documentation  Therapist must complete separate progress note for each specific clinical activity in which the individual participated during the day  Group Psychotherapy ([id number])     Group was facilitated virtually in a private office using HIPAA Compliant and Approved [employer] Teams  Axel Islas consented to the use of tele-video modality of treatment and was virtually present for psychotherapy group today  Clients engaged in discussion on porous, rigid and healthy boundaries  Clients reviewed different types of boundaries and how they struggle with the boundaries including intellectual, physical, emotional, sexual, material and timely boundaries  Kathy Walters was attentive and engaged in group  She exited around 1000 to attend a medication check appointment  Kathy Walters continues to make progress towards goals through participating in group psychotherapy and is encouraged to continue to progress towards long term goals         TX Plan Objectives: 1 1, 1 2   Therapist:  Angel Rider MA, LPC
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
## 2020-08-12 NOTE — PSYCH
Virtual Regular Visit    Problem List Items Addressed This Visit        Other    Severe episode of recurrent major depressive disorder, without psychotic features ([facility] [address] )    Chronic post-traumatic stress disorder (PTSD) - Primary    Generalized anxiety disorder with panic attacks    OCD (obsessive compulsive disorder)             Encounter provider MARAGRET Giles    Provider located at   [address]  [facility]   [address] [facility] [address]  [facility] [address]  [phone]    Recent Visits  No visits were found meeting these conditions  Showing recent visits within past 7 days and meeting all other requirements     Today's Visits  Date Type Provider Dept   08/12/20 Telemedicine Shawn Bettyjane Merlin, CRNP [facility]   Showing today's visits and meeting all other requirements     Future Appointments  No visits were found meeting these conditions  Showing future appointments within next 150 days and meeting all other requirements      The patient was identified by name and date of birth  Carly Wang was informed that this is a telemedicine visit and that the visit is being conducted through [employer]  My office door was closed  No one else was in the room  She acknowledged consent and understanding of privacy and security of the video platform  The patient has agreed to participate and understands they can discontinue the visit at any time  Patient is aware this is a billable service       HPI     Current Outpatient Medications   Medication Sig Dispense Refill    albuterol (PROVENTIL HFA,VENTOLIN HFA) 90 mcg/act inhaler Inhale 1 puff every 6 (six) hours as needed for wheezing or shortness of breath 1 Inhaler 5    Blood Glucose Monitoring Suppl (ONETOUCH VERIO) w/Device KIT Use as directed  0    cholecalciferol (VITAMIN D3) 1,000 units tablet Take 4,000 Units by mouth daily      clonazePAM (KlonoPIN) 0 5 mg tablet Take 1 tablet (0 5 mg total) by mouth 2 (two) times a day as needed for anxiety 30 tablet 1    EPINEPHrine (EPIPEN) 0 3 mg/0 3 mL SOAJ Inject 0 3 mL (0 3 mg total) into a muscle once for 1 dose 0 6 mL 0    escitalopram (LEXAPRO) 5 mg tablet Take 1 tablet (5 mg total) by mouth daily 30 tablet 0    gabapentin (NEURONTIN) 100 mg capsule TAKE THREE CAPSULES BY MOUTH EVERY DAY AT BEDTIME 90 capsule 4    glucagon (GLUCAGON EMERGENCY) 1 MG injection Inject 1 mg under the skin once as needed for low blood sugar for up to 2 doses 1 kit 1    insulin aspart (NovoLOG) 100 Units/mL injection pen Inject 3 Units under the skin 3 (three) times a day with meals 5 pen 1    insulin aspart (NovoLOG) 100 units/mL injection Use 30 units per day via pump Disp 1 vial per month 30 mL 1    insulin degludec Catia Perales FlexTouch) 100 units/mL injection pen Inject 35 Units under the skin daily at bedtime 8 units daily (Patient not taking: Reported on 8/7/2020) 5 pen 1    Insulin Infusion Pump KIT 13 mm cannula, 23 inch tubing change site every 3 days      Insulin Pen Needle (BD PEN NEEDLE NASIM U/F) 32G X 4 MM MISC by Does not apply route 4 (four) times a day for 180 days 400 each 3    Levonorgestrel-Ethinyl Estrad (ALTAVERA PO) Take 1 tablet by mouth      Multiple Vitamin (DAILY VALUE MULTIVITAMIN) TABS Take by mouth      ONETOUCH DELICA LANCETS 63C MISC Patient test 6 times daily 600 each 1    ONETOUCH VERIO test strip Test six times a day 600 each 3     No current facility-administered medications for this visit  Review of Systems  Video Exam    There were no vitals filed for this visit  Physical Exam   As a result of this visit, I have referred the patient for further respiratory evaluation  No    I spent 15 minutes directly with the patient during this visit  VIRTUAL VISIT DISCLAIMER    Sharonda Forman acknowledges that she has consented to an online visit or consultation   She understands that the online visit is based solely on information provided by her, and that, in the absence of a face-to-face physical evaluation by the physician, the diagnosis she receives is both limited and provisional in terms of accuracy and completeness  This is not intended to replace a full medical face-to-face evaluation by the physician  Tristan Sesay understands and accepts these terms  PHP MEDICATION MANAGEMENT NOTE        [facility]    Name and Date of Birth:  Tristan Sesay 23 y o  1995 MRN: [id number]    Date of Visit: August 12, 2020    Allergies   Allergen Reactions    Insulin Glargine      Burning and redness under skin    Iodides Throat Swelling and Swelling     Reaction Date: 28Jul2014; Action Taken: none; Category: Allergy; Reaction Date: 28Jul2014; Action Taken: none; Category: Allergy;     Iodine      SUBJECTIVE:    Jd Rivas is seen today for a follow up for depression, PTSD, anxiety and OCD  She continues to experience ongoing symptoms since beginning PHP  Patient states that since patient has been placed in group with peers she has found group more helpful  Patient continues to struggle with severe nausea and variability in blood sugar readings since starting Lexapro  Patient is more prone to hypoglycemia and requires greater carbohydrate intake to counter low blood sugar  Patient also has difficulty eating due to nausea  This is been an ongoing problem with virtually all past psychiatric medication trials  Discussed developmental history with patient after patient noted to be rocking during assessment  Patient states she did well in school through elementary to high school  Patient describes herself as antisocial or not liking to be around other people  PLAN:  Maintain Lexapro 5 mg p o  Daily through next week to assess for improvement in side effect profile   -consider increase Lexapro to 10 mg p o  Daily at next visit    Aware of 24 hour and weekend coverage for urgent situations accessed by calling [facility] main practice number  Continue partial hospitalization program    Diagnoses and all orders for this visit:    Chronic post-traumatic stress disorder (PTSD)    Severe episode of recurrent major depressive disorder, without psychotic features ([address] )    Generalized anxiety disorder with panic attacks    Mixed obsessional thoughts and acts        HPI ROS Appetite Changes and Sleep:     She reports decrease in number of sleep hours (2-3 hours), decreased appetite, low energy   Patient denies suicidal or homicidal ideation    Review Of Systems:      General emotional problems, sleep disturbances, appetite disturbances and decreased functioning   Personality no change in personality   Constitutional negative   ENT negative   Cardiovascular negative   Respiratory negative   Gastrointestinal negative   Genitourinary negative   Musculoskeletal negative   Integumentary negative   Neurological negative   Endocrine negative   Other Symptoms none, all other systems are negative     Mental Status Evaluation:    Appearance Adequate hygiene and grooming and Poor eye contact   Behavior cooperative, Superficial and Patient rocking back and forth during assessment   Mood anxious and depressed  Depression Scale - 6 of 10 (0 = No depression)  Anxiety Scale - 5 of 10 (0 = No anxiety)   Speech Normal rate and volume   Affect constricted   Thought Processes Goal directed and coherent   Thought Content Does not verbalize delusional material   Associations Tightly connected   Perceptual Disturbances Denies hallucinations and does not appear to be responding to internal stimuli   Risk Potential Suicidal/Homicidal Ideation - No evidence of suicidal or homicidal ideation and Patient does not verbalize suicidal or homicidal ideation  Risk of Violence - No evidence of risk for violence found on assessment  Risk of Self Mutilation - No evidence of risk for self mutilation found on assessment   Orientation oriented to person, place, time/date and situation   Memory recent and remote memory grossly intact   Consciousness alert and awake   Attention/Concentration attention span and concentration are age appropriate   Insight limited   Judgement fair   Muscle Strength and Gait normal muscle strength and normal muscle tone, normal gait/station and normal balance   Motor Activity no abnormal movements   Language no difficulty naming common objects, no difficulty repeating a phrase, no difficulty writing a sentence   Fund of Knowledge adequate knowledge of current events  adequate fund of knowledge regarding past history  adequate fund of knowledge regarding vocabulary      Past Psychiatric History Update:     Inpatient Psychiatric Admission Since Last Encounter:   no  Suicide Attempt Or Self Mutilation Since Last Encounter:   no  Incidence of Violent Behavior Since Last Encounter:   no    Traumatic History Update:     New Onset of Abuse Since Last Encounter:   no  Traumatic Events Since Last Encounter:   no    Past Medical History:    Past Medical History:   Diagnosis Date    Abdominal pain     Anxiety     Anxiety and depression     Depression     Diabetes ([facility] )     type 1    [facility] 1/17/2019    Eating disorder     history of anorexia/bulemia 7165-1785    Fracture of fibula     R Salter I    Hashimoto's disease 2/21/2020    Head injury     Nasal congestion     Obsessive-compulsive disorder     PTSD (post-traumatic stress disorder)     Rectal bleed      Past Medical History Pertinent Negatives:   Diagnosis Date Noted    Kidney stone 12/13/2018    Seizures ([facility] [address] ) 08/07/2020     Past Surgical History:   Procedure Laterality Date    KNEE SURGERY Right 07/06/2020    NASAL SEPTOPLASTY W/ TURBINOPLASTY      WISDOM TOOTH EXTRACTION      WRIST SURGERY      left;  Excision of ganglion     Allergies   Allergen Reactions    Insulin Glargine      Burning and redness under skin    Iodides Throat Swelling and Swelling     Reaction Date: 28Jul2014; Action Taken: none; Category: Allergy; Reaction Date: 28Jul2014; Action Taken: none; Category: Allergy;     Iodine      Substance Abuse History:    Social History     Substance and Sexual Activity   Alcohol Use Yes    Frequency: Monthly or less    Drinks per session: 1 or 2    Binge frequency: Never    Comment: socially  Social History     Substance and Sexual Activity   Drug Use No     Social History:    Social History     Socioeconomic History    Marital status: Single     Spouse name: Not on file    Number of children: 0    Years of education: Not on file    Highest education level: Associate degree: academic program   Occupational History    Occupation: unemployed   Social Needs    Financial resource strain: Somewhat hard    Food insecurity     Worry: Not on file     Inability: Not on file    Transportation needs     Medical: Not on file     Non-medical: Not on file   Tobacco Use    Smoking status: Never Smoker    Smokeless tobacco: Never Used    Tobacco comment: Tobacco smoke exposure (Father smokes cigars)   Substance and Sexual Activity    Alcohol use: Yes     Frequency: Monthly or less     Drinks per session: 1 or 2     Binge frequency: Never     Comment: socially      Drug use: No    Sexual activity: Yes     Partners: Male     Birth control/protection: Condom Male, OCP   Lifestyle    Physical activity     Days per week: 7 days     Minutes per session: 10 min    Stress: Rather much   Relationships    Social connections     Talks on phone: More than three times a week     Gets together: Not on file     Attends Jew service: Not on file     Active member of club or organization: No     Attends meetings of clubs or organizations: Never     Relationship status: Never     Intimate partner violence     Fear of current or ex partner: No     Emotionally abused: No     Physically abused: No     Forced sexual activity: No   Other Topics Concern    Not on file   Social History Narrative    Student at [employer] a poor diet; low in vegetables, high in sweets    Dental care, regularly    Lives with parents    Sleeps 8-10 hours a day     Family Psychiatric History:     Family History   Problem Relation Age of Onset    Hypertension Mother     Migraines Mother         Headache    Diabetes type II Mother     Varicose Veins Mother     Hyperlipidemia Mother     Diabetes Mother    Mustapha Lemme Arthritis Mother     Depression Mother     Cholelithiasis Father     Hypertension Father     Sarcoidosis Father         Liver    Hyperlipidemia Father     Diabetes Father     Coronary artery disease Father     Nephrolithiasis Father     Cirrhosis Father     Alcohol abuse Father     Thyroid disease Sister     Cancer Family     Diabetes Family     Hypertension Family     Alcohol abuse Brother      History Review: The following portions of the patient's history were reviewed and updated as appropriate: allergies, current medications, past family history, past medical history, past social history, past surgical history and problem list     OBJECTIVE:     Vital signs in last 24 hours: There were no vitals filed for this visit  Laboratory Results: I have personally reviewed all pertinent laboratory/tests results  Medications Risks/Benefits:      Risks, Benefits And Possible Side Effects Of Medications:    Discussed risks and benefits of treatment with patient including risk of suicidality and serotonin syndrome related to treatment with antidepressants;  Risk of induction of manic symptoms in certain patient populations     Controlled Medication Discussion:       Minna Haines has been filling controlled prescriptions on time as prescribed according to [address] [facility], [address] 08/12/20

## 2020-08-13 ENCOUNTER — OFFICE VISIT (OUTPATIENT)
Dept: PSYCHOLOGY | Facility: CLINIC | Age: 25
End: 2020-08-13
Payer: COMMERCIAL

## 2020-08-13 DIAGNOSIS — F42.2 MIXED OBSESSIONAL THOUGHTS AND ACTS: ICD-10-CM

## 2020-08-13 DIAGNOSIS — F33.2 SEVERE EPISODE OF RECURRENT MAJOR DEPRESSIVE DISORDER, WITHOUT PSYCHOTIC FEATURES (HCC): ICD-10-CM

## 2020-08-13 DIAGNOSIS — F41.0 GENERALIZED ANXIETY DISORDER WITH PANIC ATTACKS: Primary | ICD-10-CM

## 2020-08-13 DIAGNOSIS — F43.12 CHRONIC POST-TRAUMATIC STRESS DISORDER (PTSD): ICD-10-CM

## 2020-08-13 DIAGNOSIS — F41.1 GENERALIZED ANXIETY DISORDER WITH PANIC ATTACKS: Primary | ICD-10-CM

## 2020-08-13 PROCEDURE — 90832 PSYTX W PT 30 MINUTES: CPT

## 2020-08-13 PROCEDURE — H0035 MH PARTIAL HOSP TX UNDER 24H: HCPCS

## 2020-08-13 NOTE — PSYCH
Virtual Regular Visit      Assessment/Plan:    Problem List Items Addressed This Visit        Other    Severe episode of recurrent major depressive disorder, without psychotic features ([facility] [address] )    Chronic post-traumatic stress disorder (PTSD)    Generalized anxiety disorder with panic attacks - Primary    OCD (obsessive compulsive disorder)               Reason for visit is VIRTUAL PHP GROUP DUE TO COVID-19  Encounter provider [facility]    Provider located at [address] [place] [id number]      Recent Visits  Date Type Provider Dept   08/12/20 Office Visit [facility]   08/11/20 Office Visit [facility]   08/10/20 Office Visit [facility]   08/07/20 Office Visit [facility]   Showing recent visits within past 7 days and meeting all other requirements     Today's Visits  Date Type Provider Dept   08/13/20 Office Visit [facility]   Showing today's visits and meeting all other requirements     Future Appointments  No visits were found meeting these conditions  Showing future appointments within next 150 days and meeting all other requirements        The patient was identified by name and date of birth  Doris Dick was informed that this is a telemedicine visit and that the visit is being conducted through [employer]  My office door was closed  No one else was in the room  She acknowledged consent and understanding of privacy and security of the video platform  The patient has agreed to participate and understands they can discontinue the visit at any time  Patient is aware this is a billable service       Subjective  Doris Dick is a 23 y o  female      HPI     Past Medical History:   Diagnosis Date    Abdominal pain     Anxiety     Anxiety and depression     Depression  Diabetes ([facility] [address] )     type 1    [facility] 1[facility]17[facility]2019    Eating disorder     history of anorexia/bulemia 1341-1603    Fracture of fibula     R Salter I    Hashimoto's disease 2/21/2020    Head injury     Nasal congestion     Obsessive-compulsive disorder     PTSD (post-traumatic stress disorder)     Rectal bleed        Past Surgical History:   Procedure Laterality Date    KNEE SURGERY Right 07/06/2020    NASAL SEPTOPLASTY W/ TURBINOPLASTY      WISDOM TOOTH EXTRACTION      WRIST SURGERY      left;  Excision of ganglion       Current Outpatient Medications   Medication Sig Dispense Refill    albuterol (PROVENTIL HFA,VENTOLIN HFA) 90 mcg/act inhaler Inhale 1 puff every 6 (six) hours as needed for wheezing or shortness of breath 1 Inhaler 5    Blood Glucose Monitoring Suppl (ONETOUCH VERIO) w/Device KIT Use as directed  0    cholecalciferol (VITAMIN D3) 1,000 units tablet Take 4,000 Units by mouth daily      clonazePAM (KlonoPIN) 0 5 mg tablet Take 1 tablet (0 5 mg total) by mouth 2 (two) times a day as needed for anxiety 30 tablet 1    EPINEPHrine (EPIPEN) 0 3 mg/0 3 mL SOAJ Inject 0 3 mL (0 3 mg total) into a muscle once for 1 dose 0 6 mL 0    escitalopram (LEXAPRO) 5 mg tablet Take 1 tablet (5 mg total) by mouth daily 30 tablet 0    gabapentin (NEURONTIN) 100 mg capsule TAKE THREE CAPSULES BY MOUTH EVERY DAY AT BEDTIME 90 capsule 4    glucagon (GLUCAGON EMERGENCY) 1 MG injection Inject 1 mg under the skin once as needed for low blood sugar for up to 2 doses 1 kit 1    insulin aspart (NovoLOG) 100 Units/mL injection pen Inject 3 Units under the skin 3 (three) times a day with meals 5 pen 1    insulin aspart (NovoLOG) 100 units/mL injection Use 30 units per day via pump Disp 1 vial per month 30 mL 1    insulin degludec Velinda Ring FlexTouch) 100 units/mL injection pen Inject 35 Units under the skin daily at bedtime 8 units daily (Patient not taking: Reported on 8/7/2020) 5 pen 1    Insulin Infusion Pump KIT 13 mm cannula, 23 inch tubing change site every 3 days      Insulin Pen Needle (BD PEN NEEDLE NASIM U/F) 32G X 4 MM MISC by Does not apply route 4 (four) times a day for 180 days 400 each 3    Levonorgestrel-Ethinyl Estrad (ALTAVERA PO) Take 1 tablet by mouth      Multiple Vitamin (DAILY VALUE MULTIVITAMIN) TABS Take by mouth      ONETOUCH DELICA LANCETS 42R MISC Patient test 6 times daily 600 each 1    ONETOUCH VERIO test strip Test six times a day 600 each 3     No current facility-administered medications for this visit  Allergies   Allergen Reactions    Insulin Glargine      Burning and redness under skin    Iodides Throat Swelling and Swelling     Reaction Date: 28Jul2014; Action Taken: none; Category: Allergy; Reaction Date: 28Jul2014; Action Taken: none; Category: Allergy;     Iodine        Review of Systems    Video Exam    There were no vitals filed for this visit  Physical Exam     I spent FOUR GROUP HOURS PLUS CASE MANAGEMENT minutes with patient today in which greater than 50% of the time was spent in counseling/coordination of care regarding PHP - SEE NOTES  VIRTUAL VISIT DISCLAIMER    Ryland Copeland acknowledges that she has consented to an online visit or consultation  She understands that the online visit is based solely on information provided by her, and that, in the absence of a face-to-face physical evaluation by the physician, the diagnosis she receives is both limited and provisional in terms of accuracy and completeness  This is not intended to replace a full medical face-to-face evaluation by the physician  Ryland Copeland understands and accepts these terms

## 2020-08-13 NOTE — PSYCH
This group was facilitated virtually in a private office using HIPAA Compliant and Approved [employer] Teams   Jenny consented to the use of tele-video modality of treatment  Jenny participated in wellness group focused on the relationship between sleep disturbances, mood disturbances, and immunity  Jenny made good progress towards goals  Will continue groups to provide individual education on how to improve mood through development of healthier sleeping habits  A sleep diary was provided      Subjective:     Patient ID: Karuna Belle is a 23 y o  female  [facility] Clinical Progress Notes      Specialized Services Documentation  Therapist must complete separate progress note for each specific clinical activity in which the individual participated during the day         Group Psychotherapy 10:30 am-11:15 am Tx Plan Objective: 1 3, Therapist:  Marcus Kiser RN

## 2020-08-13 NOTE — PSYCH
Subjective:     Patient ID: Maty Mirza is a 23 y o  female  [facility] Clinical Progress Notes      Specialized Services Documentation  Therapist must complete separate progress note for each specific clinical activity in which the individual participated during the day  Group Psychotherapy ([id number])     Group was facilitated virtually in a private office using HIPAA Compliant and Approved [employer] Teams  Maty Mirza consented to the use of tele-video modality of treatment and was virtually present for psychotherapy group today  Clients reviewed ways and strategies to establish boundaries  Clients engaged in an exercise in understanding their current boundaries and exploring their wants and needs in relationships  Clients engaged in productive discussion  Lizeduardo Cuevasfidel was attentive in group  She continues to make progress towards goals through participating in group psychotherapy and is encouraged to continue to progress towards long term goals  TX Plan Objectives: 1 1, 1 2, 1 4   Therapist:  Miranda Bonner MA, [facility]        Education Therapy  This group was facilitated virtually in a private office using HIPAA Compliant and Approved [employer] Teams  Maty Mirza consented to the use of tele-video modality of treatment    Time:  8075-6080  Previous goal met: Yes   Readiness to Learning: Receptive  Barriers to Learning: None  Learning Assessment  Time: 7359-5419  Education Completed: Aftercare, Allied therapy, Wellness Tools and Psychotherapy   Teaching Method: Verbal, A/V and Demonstration  Shared Area of Learning: Yes   Goal Set: Mentally review my relationships  Tx Plan Objective: 1 4, Therapist: Miranda Bonner MA, [facility]

## 2020-08-13 NOTE — PSYCH
Subjective:     Patient ID: Dionne Baker is a 23 y o  female  [facility] Clinical Progress Notes      Specialized Services Documentation  Therapist must complete separate progress note for each specific clinical activity in which the individual participated during the day  Group Psychotherapy     ([id number]) Group was facilitated virtually in a private office using HIPAA Compliant and Approved [employer] Teams  Dionne Baker consented to the use of tele-video modality of treatment and was virtually present for group psychotherapy process today  Dionne Baker  actively participated in psychoeducational group on mental health  The group played mental health jeopardy  Group learned about diagnosis, treatments, coping skills, social supports and self-esteem  Group was able to use questions as prompts for further discussions  She engaged and participatory through entire game  Good progress towards goal  Continue psychoeducational group to educate about mental health and treatments  Tx Plan Objective: 1 3, 1 4 Therapist:  Haider Ferraro RN      Case Management Note - Virtual Psychotherapy    Haider Ferraro RN    Current suicide risk: low    ([id number]) Dionne Baker and writer spoke for [employer]  Rosanne Brandt feels like she is learning a lot from program  States she may be having mood swings  Writer sent Rosanne Brandt a mood tracker and encouraged her to track them through the weekend and notice and stimuli that might trigger the moods  She is encourages to track her sugar  Denies HI/SI/psychosis    Medications changes/added/denied? No    Treatment session number: no    Individual Case Management Visit provided today?  yes    [facility] follow up physician's orders: na

## 2020-08-14 ENCOUNTER — OFFICE VISIT (OUTPATIENT)
Dept: PSYCHOLOGY | Facility: CLINIC | Age: 25
End: 2020-08-14
Payer: COMMERCIAL

## 2020-08-14 DIAGNOSIS — F43.12 CHRONIC POST-TRAUMATIC STRESS DISORDER (PTSD): ICD-10-CM

## 2020-08-14 DIAGNOSIS — F33.2 SEVERE EPISODE OF RECURRENT MAJOR DEPRESSIVE DISORDER, WITHOUT PSYCHOTIC FEATURES (HCC): ICD-10-CM

## 2020-08-14 DIAGNOSIS — F41.1 GENERALIZED ANXIETY DISORDER WITH PANIC ATTACKS: Primary | ICD-10-CM

## 2020-08-14 DIAGNOSIS — F42.2 MIXED OBSESSIONAL THOUGHTS AND ACTS: ICD-10-CM

## 2020-08-14 DIAGNOSIS — F41.0 GENERALIZED ANXIETY DISORDER WITH PANIC ATTACKS: Primary | ICD-10-CM

## 2020-08-14 PROCEDURE — H0035 MH PARTIAL HOSP TX UNDER 24H: HCPCS

## 2020-08-14 NOTE — PSYCH
Subjective:     Patient ID: Ashley Cottrell is a 23 y o  female  [facility] Clinical Progress Notes      Specialized Services Documentation  Therapist must complete separate progress note for each specific clinical activity in which the individual participated during the day  Group Psychotherapy     ([id number]) Group was facilitated virtually in a private office using HIPAA Compliant and Approved [employer] Teams  Ashley Cottrell consented to the use of tele-video modality of treatment and was virtually present for group psychotherapy process today  Ashley Cottrell actively shared in psychotherapy group which focused on Weekly Wellness Assessment  She engaged in self-rate and discussion  Group members individually went through the assessment and rated themselves based on the past week  Group members shared assessment results  Group discussed the six domains of wellness; physical, emotional, cognitive, vocational, social, and spiritual  Group discussed strengths, challenges, barriers, and ways to increase each domain in and open forum and developed goals  Good progress towards goal observed and shared  Continue psychotherapy to further encourage self-reflection on strengths and challenges with personal wellness  Tx Plan Objective:  1 1,1 2,1 3,1 4 Therapist:  Alejandro Hirsch RN; Co-Facilitated by: Desire Swenson  Education Therapy   Group was facilitated virtually in a private office using HIPAA Compliant and Approved [employer] Teams  Ashley Cottrell consented to the use of tele-video modality of treatment and was virtually present for group psychotherapy process today     Time:  8137-9550  Previous goal met: Yes   Readiness to Learning: Receptive  Barriers to Learning: None  Learning Assessment  Time: 9875-5499  Education Completed: Wellness Tools  Teaching Method: Verbal  Shared Area of Learning: Yes   Goal Set: Track moods, triggers and blood sugars    Tx Plan Objective: 1 4 , Therapist:  Vicky Powers RN    Case Management Note - Virtual Psychotherapy    Vicky Powers RN    Current suicide risk: na - Did not meet     and Chrissie Delgado did not meet today  Chrissie Delgado had no request to meet today  Next scheduled virtual meetin2020    Medications changes/added/denied? No    Treatment session number: 4    Individual Case Management Visit provided today?  No    [facility] follow up physician's orders: na

## 2020-08-14 NOTE — PSYCH
Subjective:     Patient ID: Cynthia Gomez is a 23 y o  female  [facility] Clinical Progress Notes      Specialized Services Documentation  Therapist must complete separate progress note for each specific clinical activity in which the individual participated during the day  Group Psychotherapy ([id number])     Group was co-facilitated virtually with RN in a private office using HIPAA Compliant and Approved [employer] Teams  Cynthia Gomez consented to the use of tele-video modality of treatment and was virtually present for psychotherapy group today  Clients engaged in activity reviewing their own personal rights in relationships to wrap up week discussion  Clients engaged in activity and skill building of I statements  Clients discussed a personal example of where assertive communication was needed  VAL appeared attentive but did not share  She continues to make progress towards goals through participating in group psychotherapy and is encouraged to continue to progress towards long term goals         TX Plan Objectives: 1 1, 1 2, 1 4   Therapist:  Adina De La Fuente MA, [address], Kareen Sevilla RN

## 2020-08-14 NOTE — PROGRESS NOTES
Subjective:     Patient ID: Chrissie Delgado is a 23 y o  female    [facility] Clinical Progress Notes      Specialized Services Documentation  Therapist must complete separate progress note for each specific clinical activity in which the individual participated during the day  Allied Therapy Group ([id number]) This group was facilitated virtually in a private office using HIPAA Compliant and Approved Microsoft Teams  Chrissie Delgado consented to the use of tele-video modality of treatment  Pt actively participated in Mindfulness  group  The objective of this group is to educate pt on the use of mindfulness as a distress tolerance strategy  Participants were guided through a mindfulness exercise focusing on breathing and body awareness  Educated pt that mindfulness techniques can refocus thoughts on the present moment in the midst of distress  Educated that regular practice of mindfulness can improve healthy coping skill implementation and distress tolerance to facilitate increased participation in meaningful daily routines  Pt initiated socialization with peers in an appropriate and respectful manner  Pt offered relevant support and advice to peers during group discussion  Pt shared advice to a peer regarding a resource for engaging in mindfulness meditations   Pt was able to follow multi-step directions without difficulty  Pt was able to attend to activity and discussion throughout duration of group session  Pt appears to be making good progress toward established goals  Continue to engage pt in group allied therapy in order to continue to progress toward long-term goal achievement   Tx Plan Objective: 1 1 , Therapist: Mark Joseph MS, OTR/L

## 2020-08-14 NOTE — PSYCH
Virtual Regular Visit      Assessment/Plan:    Problem List Items Addressed This Visit        Other    Severe episode of recurrent major depressive disorder, without psychotic features ([facility] [address] )    Chronic post-traumatic stress disorder (PTSD)    Generalized anxiety disorder with panic attacks - Primary    OCD (obsessive compulsive disorder)               Reason for visit is VIRTUAL PHP GROUP DUE TO COVID-19  Encounter provider [facility]    Provider located at [address] [place] [id number]      Recent Visits  Date Type Provider Dept   08/13/20 Office Visit [facility]   08/12/20 [facility] Visit [facility]   08/11/20 [facility]   08/10/20 [facility]   08/07/20 [facility]   Showing recent visits within past 7 days and meeting all other requirements     Future Appointments  No visits were found meeting these conditions  Showing future appointments within next 150 days and meeting all other requirements        The patient was identified by name and date of birth  Lia Bell was informed that this is a telemedicine visit and that the visit is being conducted through [employer]  My office door was closed  No one else was in the room  She acknowledged consent and understanding of privacy and security of the video platform  The patient has agreed to participate and understands they can discontinue the visit at any time  Patient is aware this is a billable service       Subjective  Lia Bell is a 23 y o  female      HPI     Past Medical History:   Diagnosis Date    Abdominal pain     Anxiety     Anxiety and depression     Depression     Diabetes ([facility] [address] )     type 1    Diabetes mellitus ([facility] [address] ) 1/17/2019    Eating disorder     history of anorexia/bulemia [id number]    Fracture of fibula     R Salter I    Hashimoto's disease 2/21/2020    Head injury     Nasal congestion     Obsessive-compulsive disorder     PTSD (post-traumatic stress disorder)     Rectal bleed        Past Surgical History:   Procedure Laterality Date    KNEE SURGERY Right 07/06/2020    NASAL SEPTOPLASTY W/ TURBINOPLASTY      WISDOM TOOTH EXTRACTION      WRIST SURGERY      left;  Excision of ganglion       Current Outpatient Medications   Medication Sig Dispense Refill    albuterol (PROVENTIL HFA,VENTOLIN HFA) 90 mcg/act inhaler Inhale 1 puff every 6 (six) hours as needed for wheezing or shortness of breath 1 Inhaler 5    Blood Glucose Monitoring Suppl (ONETOUCH VERIO) w/Device KIT Use as directed  0    cholecalciferol (VITAMIN D3) 1,000 units tablet Take 4,000 Units by mouth daily      clonazePAM (KlonoPIN) 0 5 mg tablet Take 1 tablet (0 5 mg total) by mouth 2 (two) times a day as needed for anxiety 30 tablet 1    EPINEPHrine (EPIPEN) 0 3 mg/0 3 mL SOAJ Inject 0 3 mL (0 3 mg total) into a muscle once for 1 dose 0 6 mL 0    escitalopram (LEXAPRO) 5 mg tablet Take 1 tablet (5 mg total) by mouth daily 30 tablet 0    gabapentin (NEURONTIN) 100 mg capsule TAKE THREE CAPSULES BY MOUTH EVERY DAY AT BEDTIME 90 capsule 4    glucagon (GLUCAGON EMERGENCY) 1 MG injection Inject 1 mg under the skin once as needed for low blood sugar for up to 2 doses 1 kit 1    insulin aspart (NovoLOG) 100 Units/mL injection pen Inject 3 Units under the skin 3 (three) times a day with meals 5 pen 1    insulin aspart (NovoLOG) 100 units/mL injection Use 30 units per day via pump Disp 1 vial per month 30 mL 1    insulin degludec [employer] Burow FlexTouch) 100 units/mL injection pen Inject 35 Units under the skin daily at bedtime 8 units daily (Patient not taking: Reported on 8/7/2020) 5 pen 1    Insulin Infusion Pump KIT 13 mm cannula, 23 inch tubing change site every 3 days      Insulin Pen Needle (BD PEN NEEDLE NASIM U/F) 32G X 4 MM MISC by Does not apply route 4 (four) times a day for 180 days 400 each 3    Levonorgestrel-Ethinyl Estrad (ALTAVERA PO) Take 1 tablet by mouth      Multiple Vitamin (DAILY VALUE MULTIVITAMIN) TABS Take by mouth      ONETOUCH DELICA LANCETS 84P MISC Patient test 6 times daily 600 each 1    ONETOUCH VERIO test strip Test six times a day 600 each 3     No current facility-administered medications for this visit  Allergies   Allergen Reactions    Insulin Glargine      Burning and redness under skin    Iodides Throat Swelling and Swelling     Reaction Date: 28Jul2014; Action Taken: none; Category: Allergy; Reaction Date: 28Jul2014; Action Taken: none; Category: Allergy;     Iodine        Review of Systems    Video Exam    There were no vitals filed for this visit  Physical Exam     I spent FOUR GROUP HOURS PLUS CASE MANAGEMENT minutes with patient today in which greater than 50% of the time was spent in counseling/coordination of care regarding PHP - SEE NOTES  VIRTUAL VISIT DISCLAIMER    Dilshad Barakat acknowledges that she has consented to an online visit or consultation  She understands that the online visit is based solely on information provided by her, and that, in the absence of a face-to-face physical evaluation by the physician, the diagnosis she receives is both limited and provisional in terms of accuracy and completeness  This is not intended to replace a full medical face-to-face evaluation by the physician  Dilshad Barakat understands and accepts these terms

## 2020-08-17 ENCOUNTER — OFFICE VISIT (OUTPATIENT)
Dept: PSYCHOLOGY | Facility: CLINIC | Age: 25
End: 2020-08-17
Payer: COMMERCIAL

## 2020-08-17 DIAGNOSIS — F41.1 GENERALIZED ANXIETY DISORDER WITH PANIC ATTACKS: Primary | ICD-10-CM

## 2020-08-17 DIAGNOSIS — F41.0 GENERALIZED ANXIETY DISORDER WITH PANIC ATTACKS: Primary | ICD-10-CM

## 2020-08-17 DIAGNOSIS — F33.2 SEVERE EPISODE OF RECURRENT MAJOR DEPRESSIVE DISORDER, WITHOUT PSYCHOTIC FEATURES (HCC): ICD-10-CM

## 2020-08-17 DIAGNOSIS — F43.12 CHRONIC POST-TRAUMATIC STRESS DISORDER (PTSD): ICD-10-CM

## 2020-08-17 DIAGNOSIS — F42.2 MIXED OBSESSIONAL THOUGHTS AND ACTS: ICD-10-CM

## 2020-08-17 PROCEDURE — H0035 MH PARTIAL HOSP TX UNDER 24H: HCPCS

## 2020-08-17 NOTE — PSYCH
Subjective:     Patient ID: Joanne Talbot is a 23 y o  female  [facility] Clinical Progress Notes      Specialized Services Documentation  Therapist must complete separate progress note for each specific clinical activity in which the individual participated during the day  Group Psychotherapy     ([id number]) Group was facilitated virtually in a private office using HIPAA Compliant and Approved [employer] Teams  Joanne Talbot consented to the use of tele-video modality of treatment and was virtually present for group psychotherapy process today  Joanne Talbot  actively engaged in wellness group entitled Liqueo  Group opened by discussing verbal vs non verbal communication and the benefits on developing good social skills  The group then played Social Bingo which featured a board with social prompts on it  When members got a letter and number combination from their board, they used the prompt to talk about themselves or something relevant to treatment or the world today  Activity helped the group to develop social talking points and to learn about each other  Good progress towards goal noted through participation and peer support  Continue wellness and psychoeducational groups to educate on the importance of developing social skills  Tx Plan Objective: 1 4, Therapist:  Sandeep Ruelas RN  Case Management Note    Sandeep Ruelas RN    Current suicide risk : Low     ([id number]) Spoke with Joanne Talbot  Reported feeling tired this weekend and sleeping over twelve hours each day  States she is trying the grounding coping skills her and writer talked about, but also states she needs to practice more  Did not try to track moods because she was "too tired " States she has "left her anxiety episode and reach her depressive episode " States blood sugars have been great  Her and her boyfriend have found an apartment near campus   She is happy to be out of her anxious episode because she couldn't live near campus during one  States lexapro continues to make her sick  Continues to complain of her mood not being balanced  She is requesting discharge for Friday due to school  Writer asked Bee Pearl to consider whether she can focus on school in the current state of mind she states she is in  [facility] that [facility]  Progress noted in that Bee Pearl is participatory in group  Likes to learn  Receptive to teaching  Barriers continue to be focuses on how medications have not worked and have given her side effects  When one thing goes well and the writer acknowledges the accomplishment, Bee Pearl immediately looks for a negative thing to focus on  Reviewed discharge for Friday and outpatient appointments  Medications changes/added/denied? Yes     Treatment session number: 4    Individual Case Management Visit provided today?  Yes     [facility] follow up physician's orders: na

## 2020-08-17 NOTE — PSYCH
Virtual Regular Visit      Assessment/Plan:    Problem List Items Addressed This Visit        Other    Severe episode of recurrent major depressive disorder, without psychotic features ([facility] [address] )    Chronic post-traumatic stress disorder (PTSD)    Generalized anxiety disorder with panic attacks - Primary    OCD (obsessive compulsive disorder)               Reason for visit is VIRTUAL PHP GROUP DUE TO COVID-19  Encounter provider [facility]    Provider located at [address] [place] [phone]      Recent Visits  Date Type Provider Dept   08/14/20 Office Visit [facility]   08/13/20 [facility] Visit [facility]   08/12/20 [facility]   08/11/20 [facility]   08/10/20 [facility] Visit [facility]   Showing recent visits within past 7 days and meeting all other requirements     Future Appointments  No visits were found meeting these conditions  Showing future appointments within next 150 days and meeting all other requirements        The patient was identified by name and date of birth  Zenaida Canavan was informed that this is a telemedicine visit and that the visit is being conducted through [employer]  My office door was closed  No one else was in the room  She acknowledged consent and understanding of privacy and security of the video platform  The patient has agreed to participate and understands they can discontinue the visit at any time  Patient is aware this is a billable service       Subjective  Zenaida Canavan is a 23 y o  female      HPI     Past Medical History:   Diagnosis Date    Abdominal pain     Anxiety     Anxiety and depression     Depression     [facility]     [facility]    Diabetes mellitus ([facility] ) 1/17/2019    Eating disorder     history of anorexia/bulemia [id number]    Fracture of fibula     R Salter I    Hashimoto's disease 2/21/2020    Head injury     Nasal congestion     Obsessive-compulsive disorder     PTSD (post-traumatic stress disorder)     Rectal bleed        Past Surgical History:   Procedure Laterality Date    KNEE SURGERY Right 07/06/2020    NASAL SEPTOPLASTY W/ TURBINOPLASTY      WISDOM TOOTH EXTRACTION      WRIST SURGERY      left;  Excision of ganglion       Current Outpatient Medications   Medication Sig Dispense Refill    albuterol (PROVENTIL HFA,VENTOLIN HFA) 90 mcg/act inhaler Inhale 1 puff every 6 (six) hours as needed for wheezing or shortness of breath 1 Inhaler 5    Blood Glucose Monitoring Suppl (ONETOUCH VERIO) w/Device KIT Use as directed  0    cholecalciferol (VITAMIN D3) 1,000 units tablet Take 4,000 Units by mouth daily      clonazePAM (KlonoPIN) 0 5 mg tablet Take 1 tablet (0 5 mg total) by mouth 2 (two) times a day as needed for anxiety 30 tablet 1    EPINEPHrine (EPIPEN) 0 3 mg/0 3 mL SOAJ Inject 0 3 mL (0 3 mg total) into a muscle once for 1 dose 0 6 mL 0    escitalopram (LEXAPRO) 5 mg tablet Take 1 tablet (5 mg total) by mouth daily 30 tablet 0    gabapentin (NEURONTIN) 100 mg capsule TAKE THREE CAPSULES BY MOUTH EVERY DAY AT BEDTIME 90 capsule 4    glucagon (GLUCAGON EMERGENCY) 1 MG injection Inject 1 mg under the skin once as needed for low blood sugar for up to 2 doses 1 kit 1    insulin aspart (NovoLOG) 100 Units/mL injection pen Inject 3 Units under the skin 3 (three) times a day with meals 5 pen 1    insulin aspart (NovoLOG) 100 units/mL injection Use 30 units per day via pump Disp 1 vial per month 30 mL 1    insulin degludec Aarti Brian FlexTouch) 100 units/mL injection pen Inject 35 Units under the skin daily at bedtime 8 units daily (Patient not taking: Reported on 8/7/2020) 5 pen 1    Insulin Infusion Pump KIT 13 mm cannula, 23 inch tubing change site every 3 days      Insulin Pen Needle (BD PEN NEEDLE NASIM U/F) 32G X 4 MM MISC by Does not apply route 4 (four) times a day for 180 days 400 each 3    Levonorgestrel-Ethinyl Estrad (ALTAVERA PO) Take 1 tablet by mouth      Multiple Vitamin (DAILY VALUE MULTIVITAMIN) TABS Take by mouth      ONETOUCH DELICA LANCETS 57N MISC Patient test 6 times daily 600 each 1    ONETOUCH VERIO test strip Test six times a day 600 each 3     No current facility-administered medications for this visit  Allergies   Allergen Reactions    Insulin Glargine      Burning and redness under skin    Iodides Throat Swelling and Swelling     Reaction Date: 28Jul2014; Action Taken: none; Category: Allergy; Reaction Date: 28Jul2014; Action Taken: none; Category: Allergy;     Iodine        Review of Systems    Video Exam    There were no vitals filed for this visit  Physical Exam     I spent FOUR GROUP HOURS PLUS CASE MANAGEMENT minutes with patient today in which greater than 50% of the time was spent in counseling/coordination of care regarding PHP - SEE NOTES  VIRTUAL VISIT DISCLAIMER    Dionne Baker acknowledges that she has consented to an online visit or consultation  She understands that the online visit is based solely on information provided by her, and that, in the absence of a face-to-face physical evaluation by the physician, the diagnosis she receives is both limited and provisional in terms of accuracy and completeness  This is not intended to replace a full medical face-to-face evaluation by the physician  Dionne Baker understands and accepts these terms

## 2020-08-17 NOTE — PSYCH
This group was facilitated virtually in a private office using HIPAA Compliant and Approved [employer] Teams   Anya Sellers consented to the use of tele-video modality of treatment   Jenny participated in wellness group focused on the relationship between healthy coping skills and mental health   The benefits of healthy coping skills: reducing stress, improving mood, increasing socialization, improving memory was discussed through a game  ABCs of coping  Anya Sellers made good progress towards goals and identified current coping skills  Guzman clements groups to provide individual education on how to improve mood through development of coping skills     Subjective:     Patient ID: Lillie Bamberger is a 23 y o  female  [facility] Clinical Progress Notes   Group Psychotherapy 9:30 am-10:15am Tx Plan Objective: 1 3, Therapist:  Elieser De La Cruz RN     Specialized Services Documentation  Therapist must complete separate progress note for each specific clinical activity in which the individual participated during the day

## 2020-08-18 ENCOUNTER — DOCUMENTATION (OUTPATIENT)
Dept: PSYCHOLOGY | Facility: CLINIC | Age: 25
End: 2020-08-18

## 2020-08-18 ENCOUNTER — APPOINTMENT (OUTPATIENT)
Dept: PSYCHOLOGY | Facility: CLINIC | Age: 25
End: 2020-08-18
Payer: COMMERCIAL

## 2020-08-18 NOTE — PROGRESS NOTES
Case Management Note    Neo Whitt RN    Current suicide risk : Unable to assess due to absence  Reji Nohelia contacted the program and stated they were not going to be present due to poor Internet connection  Medications changes/added/denied? No    Treatment session number: N/A    Individual Case Management Visit provided today? No    [facility] follow up physician's orders: None at this time

## 2020-08-19 ENCOUNTER — APPOINTMENT (OUTPATIENT)
Dept: PSYCHOLOGY | Facility: CLINIC | Age: 25
End: 2020-08-19
Payer: COMMERCIAL

## 2020-08-19 ENCOUNTER — TELEMEDICINE (OUTPATIENT)
Dept: PSYCHIATRY | Facility: CLINIC | Age: 25
End: 2020-08-19
Payer: COMMERCIAL

## 2020-08-19 ENCOUNTER — DOCUMENTATION (OUTPATIENT)
Dept: PSYCHOLOGY | Facility: CLINIC | Age: 25
End: 2020-08-19

## 2020-08-19 DIAGNOSIS — F33.2 SEVERE EPISODE OF RECURRENT MAJOR DEPRESSIVE DISORDER, WITHOUT PSYCHOTIC FEATURES (HCC): Primary | ICD-10-CM

## 2020-08-19 DIAGNOSIS — R45.1 AGITATION: ICD-10-CM

## 2020-08-19 DIAGNOSIS — F41.1 GENERALIZED ANXIETY DISORDER WITH PANIC ATTACKS: ICD-10-CM

## 2020-08-19 DIAGNOSIS — F41.0 GENERALIZED ANXIETY DISORDER WITH PANIC ATTACKS: ICD-10-CM

## 2020-08-19 PROCEDURE — 99213 OFFICE O/P EST LOW 20 MIN: CPT | Performed by: NURSE PRACTITIONER

## 2020-08-19 RX ORDER — ESCITALOPRAM OXALATE 5 MG/1
10 TABLET ORAL DAILY
Qty: 30 TABLET | Refills: 0
Start: 2020-08-19 | End: 2020-08-19

## 2020-08-19 RX ORDER — ESCITALOPRAM OXALATE 5 MG/1
5 TABLET ORAL DAILY
Qty: 30 TABLET | Refills: 0 | Status: SHIPPED | OUTPATIENT
Start: 2020-08-19 | End: 2020-09-03 | Stop reason: SDUPTHER

## 2020-08-19 RX ORDER — DIVALPROEX SODIUM 250 MG/1
250 TABLET, DELAYED RELEASE ORAL
Qty: 30 TABLET | Refills: 1 | Status: SHIPPED | OUTPATIENT
Start: 2020-08-19 | End: 2020-09-11

## 2020-08-19 NOTE — PROGRESS NOTES
Case Management Note    Norine Gowers, RN    Current suicide risk : Unable to assess due to absence   name was absent today due to a scheduled day off for a doctor's appointment  Medications changes/added/denied? No    Treatment session number: N/A    Individual Case Management Visit provided today? No    [facility] follow up physician's orders: None at this time

## 2020-08-19 NOTE — PROGRESS NOTES
Assessment/Plan:       Diagnoses and all orders for this visit:     Generalized anxiety disorder with panic attacks     Chronic post-traumatic stress disorder (PTSD)     Severe episode of recurrent major depressive disorder, without psychotic features ([facility] [address] )     Mixed obsessional thoughts and acts            Subjective:      Patient ID: Raymundo Lockett is a 23 y o  female      [facility] Treatment Plan   AREAS OF NEED: Depression, anxiety, and obsessional thoughts as evidenced by patient complaints, too much, too little sleep, difficulty socializing, poor hygiene, as related to poor physical health, poor social supports and poor feelings of self worth  Date Initiated: 08/07/20        Strengths: Trying to get better, self-discovery               LONG TERM GOAL:   Date Initiated: 08/07/20  1 0 I will identify and share three ways in which my day-to-day functioning has improved since attending program   Target Date: 9/4/2020  Completion Date:            SHORT TERM OBJECTIVES:      Date Initiated: 08/07/20  1 1 I will learn three new copings skills to cope with depression and anxiety symptoms and practice at least one on a daily basis  Revision Date: 8/19/2020  Target Date: 8/28/2020  Completion Date:      Date Initiated: 08/07/20  1 2 I will make a short and simple to do lists and complete three tasks each day  Revision Date:  8/19/2020  Target Date: 8/28/2020  Completion Date:     Date Initiated: 08/07/20  1 3 I will take medications as prescribed and share questions and concerns if arise  Revision Date: 8/19/2020  Target Date: 8/28/2020  Completion Date:      Date Initiated: 08/07/20  1 4 I will identify 3 ways my supports can assist in my recovery and agree to staff/support contact as indicated      Revision Date: 8/19/2020  Target Date: 8/28/2020  Completion Date:                7 DAY REVISION:  1 5 I will develop a mood journal to begin to track and explore my moods and compare them to my daily activities, blood sugars and other possible triggers to my mood liability     Date Initiated: 08/19/20  Revision Date:   Target Date: 8/28/2020  Completion Date:        PSYCHIATRY:  Date Initiated:  08/07/20  Medication Management and Education       Revision Date: 8/19/2020  The person(s) responsible for carrying out the plan is Stella Lopez MD     NURSING:   Date Initiated: 08/07/20  1 1,1 2,1 3,1 4 This RN will provide daily wellness group five days weekly to educate Maty Mirza on S/S of her diagnoses and medications used in treatment  Revision Date:8/19/2020  The person(s) responsible for carrying out the plan is Lashaun Pina RN     PSYCHOLOGY:   Date Initiated: 08/07/20       1 1, 1 2, 1 4 Provide psychotherapy group 5 times per week to allow opportunity for Maty Mirza  to explore stressors and ways of coping  Revision Date: 8/19/2020  The person(s) responsible for carrying out the plan is Miranda Bonner MA, [facility]     ALLIED THERAPY:   Date Initiated: 08/07/20  1 1,1 2 Engage Maty Mirza in AT group 5 times daily to encourage development and use of wellness tools to decrease symptoms and promote recovery through meaningful activity  Revision Date: 8/19/2020  The person(s) responsible for carrying out the plan is CHAY Wagner      CASE MANAGEMENT:   Date Initiated: 08/07/20      1 0 This  will meet with Maty Mirza  3-4 times weekly to assess treatment progress, discharge planning, connection to community supports and UR as indicated  Revision Date: 8/19/2020  The person(s) responsible for carrying out the plan is Lashaun Pina RN        TREATMENT REVIEW/COMMENTS:      DISCHARGE CRITERIA: Identify 3 signs of progress and complete relapse prevention plan      DISCHARGE PLAN: Return to OP  Estimated Length of Stay: 10 treatment days         Diagnosis and Treatment Plan explained to Eren Needs relates understanding diagnosis and is agreeable to Treatment Plan             CLIENT COMMENTS / Please share your thoughts, feelings, need and/or experiences regarding your treatment plan: _____________________________________________________________________________________________________________________________________________________________________________________________________________________________________________________________________________________________________________________ Date/Time: ______________      Patient Signature: _________________________________      Date/Time: ______________       Signature: _________________________________      Date/Time: ______________

## 2020-08-19 NOTE — PSYCH
Virtual Regular Visit    Problem List Items Addressed This Visit        Other    Severe episode of recurrent major depressive disorder, without psychotic features ([facility] [address] ) - Primary    Relevant Medications    escitalopram (LEXAPRO) 5 mg tablet    divalproex sodium (DEPAKOTE) 250 mg EC tablet    Agitation    Relevant Orders    Ambulatory referral to Neurology    Generalized anxiety disorder with panic attacks    Relevant Medications    escitalopram (LEXAPRO) 5 mg tablet    Other Relevant Orders    Ambulatory referral to Neurology             Encounter provider MARGARET Giles    Provider located at   [facility] [address] [facility] [address]    Recent Visits  Date Type Provider Dept   08/12/20 Telemedicine Elieser Giles 426 recent visits within past 7 days and meeting all other requirements     Today's Visits  Date Type Provider Dept   08/19/20 Telemedicine Elieser Giles 426 today's visits and meeting all other requirements     Future Appointments  No visits were found meeting these conditions  Showing future appointments within next 150 days and meeting all other requirements      The patient was identified by name and date of birth  Carlyalisha Wang was informed that this is a telemedicine visit and that the visit is being conducted through [employer]  My office door was closed  No one else was in the room  She acknowledged consent and understanding of privacy and security of the video platform  The patient has agreed to participate and understands they can discontinue the visit at any time  Patient is aware this is a billable service       HPI     Current Outpatient Medications   Medication Sig Dispense Refill    albuterol (PROVENTIL HFA,VENTOLIN HFA) 90 mcg/act inhaler Inhale 1 puff every 6 (six) hours as needed for wheezing or shortness of breath 1 Inhaler 5    Blood Glucose Monitoring Suppl (Xavier Irby) w/Device KIT Use as directed  0    cholecalciferol (VITAMIN D3) 1,000 units tablet Take 4,000 Units by mouth daily      clonazePAM (KlonoPIN) 0 5 mg tablet Take 1 tablet (0 5 mg total) by mouth 2 (two) times a day as needed for anxiety 30 tablet 1    divalproex sodium (DEPAKOTE) 250 mg EC tablet Take 1 tablet (250 mg total) by mouth daily with breakfast 30 tablet 1    EPINEPHrine (EPIPEN) 0 3 mg/0 3 mL SOAJ Inject 0 3 mL (0 3 mg total) into a muscle once for 1 dose 0 6 mL 0    escitalopram (LEXAPRO) 5 mg tablet Take 1 tablet (5 mg total) by mouth daily 30 tablet 0    gabapentin (NEURONTIN) 100 mg capsule TAKE THREE CAPSULES BY MOUTH EVERY DAY AT BEDTIME 90 capsule 4    glucagon (GLUCAGON EMERGENCY) 1 MG injection Inject 1 mg under the skin once as needed for low blood sugar for up to 2 doses 1 kit 1    insulin aspart (NovoLOG) 100 Units/mL injection pen Inject 3 Units under the skin 3 (three) times a day with meals 5 pen 1    insulin aspart (NovoLOG) 100 units/mL injection Use 30 units per day via pump Disp 1 vial per month 30 mL 1    insulin degludec [place] FlexTouch) 100 units/mL injection pen Inject 35 Units under the skin daily at bedtime 8 units daily (Patient not taking: Reported on 8/7/2020) 5 pen 1    Insulin Infusion Pump KIT 13 mm cannula, 23 inch tubing change site every 3 days      Insulin Pen Needle (BD PEN NEEDLE NASIM U/F) 32G X 4 MM MISC by Does not apply route 4 (four) times a day for 180 days 400 each 3    Levonorgestrel-Ethinyl Estrad (ALTAVERA PO) Take 1 tablet by mouth      Multiple Vitamin (DAILY VALUE MULTIVITAMIN) TABS Take by mouth      ONETOUCH DELICA LANCETS 90B MISC Patient test 6 times daily 600 each 1    ONETOUCH VERIO test strip Test six times a day 600 each 3     No current facility-administered medications for this visit          Review of Systems  Video Exam    There were no vitals filed for this visit     Physical Exam   As a result of this visit, I have referred the patient for further respiratory evaluation  No    I spent 30 minutes directly with the patient during this visit  VIRTUAL VISIT DISCLAIMER    Sharonda Forman acknowledges that she has consented to an online visit or consultation  She understands that the online visit is based solely on information provided by her, and that, in the absence of a face-to-face physical evaluation by the physician, the diagnosis she receives is both limited and provisional in terms of accuracy and completeness  This is not intended to replace a full medical face-to-face evaluation by the physician  Sharonda Dasia understands and accepts these terms  PHP MEDICATION MANAGEMENT NOTE        [facility]    Name and Date of Birth:  Sharonda Forman 23 y o  1995 MRN: [id number]    Date of Visit: August 19, 2020    Allergies   Allergen Reactions    Insulin Glargine      Burning and redness under skin    Iodides Throat Swelling and Swelling     Reaction Date: 28Jul2014; Action Taken: none; Category: Allergy; Reaction Date: 28Jul2014; Action Taken: none; Category: Allergy;     Iodine      SUBJECTIVE:    VAL is seen today for a follow up for depression, anxiety and OCD  She continues to experience ongoing symptoms since the last visit  Continues to report anxiety irritability and mood swings  Patient has poor eye contact and increase in compulsive behaviors including slapping thigh when anxious  Repeating words and stuttering noted during assessment  She reports increased nausea, dizziness and increased blood sugar since starting Lexapro      PLAN:  Patient is reporting neurological and consistent symptoms noted in review of systems which started approximately 2 months ago   -referral for Neurology  -follow-up with endocrinologist and PCP  -initiate low-dose Depakote for irritability and anger  -assess for tolerability improvement at next appointment  Aware of 24 hour and weekend coverage for urgent situations accessed by calling [facility] main practice number  Continue partial hospitalization program    Diagnoses and all orders for this visit:    Severe episode of recurrent major depressive disorder, without psychotic features ([facility] [address] )  -     Discontinue: escitalopram (LEXAPRO) 5 mg tablet; Take 2 tablets (10 mg total) by mouth daily  -     escitalopram (LEXAPRO) 5 mg tablet; Take 1 tablet (5 mg total) by mouth daily  -     divalproex sodium (DEPAKOTE) 250 mg EC tablet; Take 1 tablet (250 mg total) by mouth daily with breakfast    Agitation  -     Ambulatory referral to Neurology; Future    Generalized anxiety disorder with panic attacks  -     Ambulatory referral to Neurology; Future    Other orders  -     Cancel: Ambulatory Referral to Neuropsychiatry; Future        HPI ROS Appetite Changes and Sleep:     She reports normal sleep, adequate appetite, adequate energy level   Patient denies suicidal or homicidal ideation    Review Of Systems:      General relationship problems, emotional problems and decreased functioning   Personality change in personality, Increased irritability and agitation   Constitutional negative   ENT negative   Cardiovascular lower extremity edema   Respiratory negative   Gastrointestinal nausea   Genitourinary negative   Musculoskeletal negative   Integumentary negative   Neurological muscle weakness, numbness and tingling   Endocrine negative   Other Symptoms none, all other systems are negative     Mental Status Evaluation:    Appearance Adequate hygiene and grooming   Behavior cooperative   Mood anxious and dysphoric  Depression Scale -  of 10 (0 = No depression)  Anxiety Scale -  of 10 (0 = No anxiety)   Speech Normal rate and volume   Affect labile   Thought Processes Goal directed and coherent   Thought Content Does not verbalize delusional material Associations Tightly connected   Perceptual Disturbances Denies hallucinations and does not appear to be responding to internal stimuli   Risk Potential Suicidal/Homicidal Ideation - No evidence of suicidal or homicidal ideation and Patient does not verbalize suicidal or homicidal ideation  Risk of Violence - No evidence of risk for violence found on assessment  Risk of Self Mutilation - No evidence of risk for self mutilation found on assessment   Orientation oriented to person, place, time/date and situation   Memory recent and remote memory grossly intact   Consciousness alert and awake   Attention/Concentration attention span and concentration are age appropriate   Insight fair   Judgement fair   Muscle Strength and Gait normal muscle strength and normal muscle tone, normal gait/station and normal balance   Motor Activity no abnormal movements   Language no difficulty naming common objects, no difficulty repeating a phrase, no difficulty writing a sentence   Fund of Knowledge adequate knowledge of current events  adequate fund of knowledge regarding past history  adequate fund of knowledge regarding vocabulary      Past Psychiatric History Update:     Inpatient Psychiatric Admission Since Last Encounter:   no  Suicide Attempt Or Self Mutilation Since Last Encounter:   no  Incidence of Violent Behavior Since Last Encounter:   no    Traumatic History Update:     New Onset of Abuse Since Last Encounter:   no  Traumatic Events Since Last Encounter:   no    Past Medical History:    Past Medical History:   Diagnosis Date    Abdominal pain     Anxiety     Anxiety and depression     Depression     Diabetes ([facility] [address] )     type 1    Diabetes mellitus ([facility] [address] ) 1/17/2019    Eating disorder     history of anorexia/bulemia 9968-7218    Fracture of fibula     R Salter I    Hashimoto's disease 2/21/2020    Head injury     Nasal congestion     Obsessive-compulsive disorder     PTSD (post-traumatic stress disorder)     Rectal bleed      Past Medical History Pertinent Negatives:   Diagnosis Date Noted    Kidney stone 12/13/2018    Seizures ([address] ) 08/07/2020     Past Surgical History:   Procedure Laterality Date    KNEE SURGERY Right 07/06/2020    NASAL SEPTOPLASTY W/ TURBINOPLASTY      WISDOM TOOTH EXTRACTION      WRIST SURGERY      left; Excision of ganglion     Allergies   Allergen Reactions    Insulin Glargine      Burning and redness under skin    Iodides Throat Swelling and Swelling     Reaction Date: 28Jul2014; Action Taken: none; Category: Allergy; Reaction Date: 28Jul2014; Action Taken: none; Category: Allergy;     Iodine      Substance Abuse History:    Social History     Substance and Sexual Activity   Alcohol Use Yes    Frequency: Monthly or less    Drinks per session: 1 or 2    Binge frequency: Never    Comment: socially  Social History     Substance and Sexual Activity   Drug Use No     Social History:    Social History     Socioeconomic History    Marital status: Single     Spouse name: Not on file    Number of children: 0    Years of education: Not on file    Highest education level: Associate degree: academic program   Occupational History    Occupation: unemployed   Social Needs    Financial resource strain: Somewhat hard    Food insecurity     Worry: Not on file     Inability: Not on file    Transportation needs     Medical: Not on file     Non-medical: Not on file   Tobacco Use    Smoking status: Never Smoker    Smokeless tobacco: Never Used    Tobacco comment: Tobacco smoke exposure (Father smokes cigars)   Substance and Sexual Activity    Alcohol use: Yes     Frequency: Monthly or less     Drinks per session: 1 or 2     Binge frequency: Never     Comment: socially      Drug use: No    Sexual activity: Yes     Partners: Male     Birth control/protection: Condom Male, OCP   Lifestyle    Physical activity     Days per week: 7 days     Minutes per session: 10 min    Stress: Rather much   Relationships    Social connections     Talks on phone: More than three times a week     Gets together: Not on file     Attends Restoration service: Not on file     Active member of club or organization: No     Attends meetings of clubs or organizations: Never     Relationship status: Never     Intimate partner violence     Fear of current or ex partner: No     Emotionally abused: No     Physically abused: No     Forced sexual activity: No   Other Topics Concern    Not on file   Social History Narrative    Student at [employer] a poor diet; low in vegetables, high in sweets    Dental care, regularly    Lives with parents    Sleeps 8-10 hours a day     Family Psychiatric History:     Family History   Problem Relation Age of Onset    Hypertension Mother     Migraines Mother         Headache    Diabetes type II Mother     Varicose Veins Mother     Hyperlipidemia Mother     Diabetes Mother     Arthritis Mother     Depression Mother     Cholelithiasis Father     Hypertension Father     Sarcoidosis Father         Liver    Hyperlipidemia Father     Diabetes Father     Coronary artery disease Father     Nephrolithiasis Father     Cirrhosis Father     Alcohol abuse Father     Thyroid disease Sister     Cancer Family     Diabetes Family     Hypertension Family     Alcohol abuse Brother      History Review: The following portions of the patient's history were reviewed and updated as appropriate: allergies, current medications, past family history, past medical history, past social history, past surgical history and problem list     OBJECTIVE:     Vital signs in last 24 hours: There were no vitals filed for this visit  Laboratory Results: I have personally reviewed all pertinent laboratory/tests results      Medications Risks/Benefits:      Risks, Benefits And Possible Side Effects Of Medications:    Discussed risks and benefits of treatment with patient including risk of suicidality and serotonin syndrome related to treatment with antidepressants;  Risk of induction of manic symptoms in certain patient populations and risk of liver impairment related to treatment with Depakote     Controlled Medication Discussion:     Not applicable    MARGARET Rabago 08/19/20

## 2020-08-20 ENCOUNTER — OFFICE VISIT (OUTPATIENT)
Dept: PSYCHOLOGY | Facility: CLINIC | Age: 25
End: 2020-08-20
Payer: COMMERCIAL

## 2020-08-20 DIAGNOSIS — F33.2 SEVERE EPISODE OF RECURRENT MAJOR DEPRESSIVE DISORDER, WITHOUT PSYCHOTIC FEATURES (HCC): Primary | ICD-10-CM

## 2020-08-20 DIAGNOSIS — F41.0 GENERALIZED ANXIETY DISORDER WITH PANIC ATTACKS: ICD-10-CM

## 2020-08-20 DIAGNOSIS — F41.1 GENERALIZED ANXIETY DISORDER WITH PANIC ATTACKS: ICD-10-CM

## 2020-08-20 PROCEDURE — H0035 MH PARTIAL HOSP TX UNDER 24H: HCPCS

## 2020-08-20 NOTE — PSYCH
Virtual Regular Visit      Assessment/Plan:    Problem List Items Addressed This Visit        Other    Severe episode of recurrent major depressive disorder, without psychotic features ([facility] [address] ) - Primary    Generalized anxiety disorder with panic attacks               Reason for visit is PHP VIRTUAL GROUP DUE TO COVID-19      Encounter provider [facility] [address] [facility]    Provider located at [place] [address]  [address] [address]    The patient was identified by name and date of birth  Yevgeniy Hansen was informed that this is a telemedicine visit and that the visit is being conducted through [employer]  My office door was closed  No one else was in the room  She acknowledged consent and understanding of privacy and security of the video platform  The patient has agreed to participate and understands they can discontinue the visit at any time  Patient is aware this is a billable service  Subjective  Yevgeniy Hansen is a 23 y o  female         I spent 4 hours of group + case management minutes with patient today in which greater than 50% of the time was spent in counseling/coordination of care regarding see notes      VIRTUAL VISIT DISCLAIMER    Yevgeniy Hansen acknowledges that she has consented to an online visit or consultation  She understands that the online visit is based solely on information provided by her, and that, in the absence of a face-to-face physical evaluation by the physician, the diagnosis she receives is both limited and provisional in terms of accuracy and completeness  This is not intended to replace a full medical face-to-face evaluation by the physician  Yevgeniy Hansen understands and accepts these terms

## 2020-08-20 NOTE — PSYCH
Subjective:     Patient ID: Marie Edwards is a 23 y o  female  [facility] Clinical Progress Notes      Specialized Services Documentation  Therapist must complete separate progress note for each specific clinical activity in which the individual participated during the day  Group Psychotherapy     ([id number]) Group was facilitated virtually in a private office using HIPAA Compliant and Approved [employer] Teams  Marie Edwards consented to the use of tele-video modality of treatment and was virtually present for group psychotherapy process today  Marie Edwards  actively engaged  in psychotherapy  group focused on decreasing self- stigma and supports  They  attentively listened to Certified Peer Specialist Eileen Myles share his life story  Group encouraged power of learning about self, accepting illness and personal responsibility in recovery  Community resources reviewed in addition to personal resources like the [employer]  Good progress toward goal noted  Continue psychotherapy to encourage self -awareness and healthy engagement of supports  Tx Plan Objective:  1 4 Therapist:  Alexandro Talbot RN        Case Management Note    Alexandro Talbot RN    Current suicide risk : Low     ([id number]) Marie Edwards and writer met for a case management meeting  Gelacio Johnson appeared rushed because she was at a pool  Stated she is considering lowering her case load  Writer talked to Gelacio Johnson about staying an extra week due to limited progression  Gelacio Johnson stated she has a class on Monday and Tuesday  When writer asked her to clarify what that meant, Gelacio Johnson stated she wouldn't be able to make it to program  Writer question whether she could schedule the online classes around the program and stay until her appointment with Gelacio Johnson on Wednesday  Reminded Gelacio Johnson that she has a new medication that she will be starting and the program can better monitor her in group   Gelacio Johnson prefers discharge tomorrow  Medications changes/added/denied? No    Treatment session number: 7    Individual Case Management Visit provided today?  Yes     [facility] follow up physician's orders: na

## 2020-08-20 NOTE — PSYCH
Subjective:     Patient ID: Axel Islas is a 23 y o  female  [facility] Clinical Progress Notes      Specialized Services Documentation  Therapist must complete separate progress note for each specific clinical activity in which the individual participated during the day  Allied Therapy   This group was facilitated virtually in a private office using [employer] and Approved [employer] Teams  Axel Islas consented to the use of tele-video modality of treatment  [id number] Axel Islas moderately shared in [facility] group focused on distress tolerance skill self-soothe  She was voiced inability to keep her camera on do to technology issues and was not observed to be engaged in therapist led object meditation  She was quiet Group discussed specific items that could help self soothe if in an anxiety crisis with encouragement to use these things regularly to manage stressors consistently  STOP skill, TIP, and Pros and Cons also reviewed  Slow effort noted toward tx goal   Continue AT to encourage development of wellness skills and consistent practice  Tx Plan Objective: 1 1, Therapist:  Carrie CARTWRIGHT    Education Therapy   This group was facilitated virtually in a private office using [employer] and Approved [employer] Teams  Axel Islas consented to the use of tele-video modality of treatment     Time:  1363-5547  Previous goal met: Yes   Readiness to Learning: Receptive  Barriers to Learning: None  Learning Assessment  Time: 7349-2980  Education Completed: Illness and Wellness Tools  Teaching Method: Verbal, A/V and Demonstration  Shared Area of Learning: Yes   Goal Set: Education  Tx Plan Objective: 1 2, Therapist:  Carrie CARTWRIGHT

## 2020-08-21 ENCOUNTER — OFFICE VISIT (OUTPATIENT)
Dept: PSYCHOLOGY | Facility: CLINIC | Age: 25
End: 2020-08-21
Payer: COMMERCIAL

## 2020-08-21 ENCOUNTER — DOCUMENTATION (OUTPATIENT)
Dept: PSYCHOLOGY | Facility: CLINIC | Age: 25
End: 2020-08-21

## 2020-08-21 DIAGNOSIS — F41.0 GENERALIZED ANXIETY DISORDER WITH PANIC ATTACKS: ICD-10-CM

## 2020-08-21 DIAGNOSIS — F41.0 GENERALIZED ANXIETY DISORDER WITH PANIC ATTACKS: Primary | ICD-10-CM

## 2020-08-21 DIAGNOSIS — F41.1 GENERALIZED ANXIETY DISORDER WITH PANIC ATTACKS: ICD-10-CM

## 2020-08-21 DIAGNOSIS — F43.12 CHRONIC POST-TRAUMATIC STRESS DISORDER (PTSD): Primary | ICD-10-CM

## 2020-08-21 DIAGNOSIS — F42.2 MIXED OBSESSIONAL THOUGHTS AND ACTS: ICD-10-CM

## 2020-08-21 DIAGNOSIS — F33.2 SEVERE EPISODE OF RECURRENT MAJOR DEPRESSIVE DISORDER, WITHOUT PSYCHOTIC FEATURES (HCC): ICD-10-CM

## 2020-08-21 DIAGNOSIS — F43.12 CHRONIC POST-TRAUMATIC STRESS DISORDER (PTSD): ICD-10-CM

## 2020-08-21 DIAGNOSIS — F41.1 GENERALIZED ANXIETY DISORDER WITH PANIC ATTACKS: Primary | ICD-10-CM

## 2020-08-21 NOTE — PSYCH
[facility] Clinical Progress Notes      Specialized Services Documentation  Therapist must complete separate progress note for each specific clinical activity in which the individual participated during the day         [facility] follow up physician's orders:   DATE 8/21/2020  TIME 10:08 AM   DISCHARGE TODAY  Kenisha Figueredo MD
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
## 2020-08-21 NOTE — PSYCH
Subjective:     Patient ID: Lee Edwards is a 23 y o  female  [facility] Clinical Progress Notes      Specialized Services Documentation  Therapist must complete separate progress note for each specific clinical activity in which the individual participated during the day  Allied Therapy   This group was facilitated virtually in a private office using [employer] and Approved [employer] Teams  Lee Edwards consented to the use of tele-video modality of treatment  [id number] *** actively shared in [facility] group exploring DBT skill changing emotional responses  *** engaged in task sharing triggers and responses to given emotions  Group explored differences between justified and unjustified emotions to prompting events and effective versus ineffective responses  With weekend approach, group explored risk of feeling lazy and *** was able to ***  Group reviewed skill Opposite Action related to depression withdraw versus get active and productive weekend choices offered  *** effort and progress noted toward goals  Continue AT to explore healthy emotional regulation and role in practicing wellness tools  Tx Plan Objective: ***, Therapist:  {PPXMDGILLI:90948}    Education Therapy   This group was facilitated virtually in a private office using [employer] and Approved [employer] Teams  Lee Edwards consented to the use of tele-video modality of treatment       Time:  2747-5163  Previous goal met: {YES (DEF)/NO:63540}  Readiness to Learning: {Readiness to Learin}  Barriers to Learning: {Barriers to Learnin}  Learning Assessment  Time: 7962-6930  Education Completed: {Education Completed:43228}  Teaching Method: {Teaching Method:11667}  Shared Area of Learning: {YES (DEF)/NO:82175}  Goal Set: ***  Tx Plan Objective: ***, Therapist:  {FPOHJJZBN:77433}

## 2020-08-21 NOTE — PROGRESS NOTES
[facility] Discharge Instructions:   Disposition: home  Address: [address] [place] [id number]    Diagnosis:  Encounter Diagnoses   Name Primary?  Chronic post-traumatic stress disorder (PTSD) Yes    Generalized anxiety disorder with panic attacks     Mixed obsessional thoughts and acts     Severe episode of recurrent major depressive disorder, without psychotic features ([facility] [address] )        Allergies (Drug/Food): Allergies   Allergen Reactions    Insulin Glargine      Burning and redness under skin    Iodides Throat Swelling and Swelling     Reaction Date: 28Jul2014; Action Taken: none; Category: Allergy; Reaction Date: 28Jul2014; Action Taken: none; Category: Allergy;     Iodine      Activity: follow your PCP or specialist's advice  Diet:follow endochrinologists advice  Smoking Cessation:not a smoker   Diagnostic/Laboratory Orders: none by this doctor  Vaccines: If you received a vaccine, please notify your family physician on your next visit  For more information, please call [phone]  Follow-up appointments/Referrals:   8/24/2020 @  3:00 with Issac Andino at internal medicine - Be sure to discuss twitching, repetitive words and mood swings   8/26/2020 @ 3:00 with Rudy Harry for Talk Therapy  9/2/2020 @ 9:00am with James Velasquez for medication management  **You have a neurology referral** They should be following up with you  Call [phone] to follow up if you do not hear back  ICM/CTT:jayne  [facility]: [facility] [phone] and [facility] [phone]  Intake/Referral/Evaluation (Non-Emergency) *NON INSURED FOR FUNDING: [facility]: [phone], [facility]: [phone], [facility]: [phone] and [place]: [phone]   Crisis Intervention (Emergency) [place] Service: [facility]: [phone], [place]: [phone], [address]: [phone], [facility]): [phone], [facility]: [phone] and [facility]: [phone]  _________________________________  National Crisis Intervention Hotline: 9-548.138.8238  National Suicide Crisis Hotline: 1-669-885-211.495.4743  I, the undersigned, have received and understand the above instructions          Patient/Rep Signature: __________________________________       Date/Time: ______________         Relationship: __________________________________________       Date/Time: ______________         Physician Signature: ____________________________________      Date/Time: ______________               Signature: ________________________________       Date/Time: ______________

## 2020-08-21 NOTE — PROGRESS NOTES
Subjective:     Patient ID: Osmany Santillan is a 23 y o  female  [facility] Clinical Progress Notes      Specialized Services Documentation  Therapist must complete separate progress note for each specific clinical activity in which the individual participated during the day  Case Management Note    Astrid García RN    Current suicide risk : Low     ([id number]) Called and spoke with Osmany Santillan  She was not in program today due to poor Internet connection  Reviewed  aftercare plan, and medication list (copies provided via email)  Osmany Santillan shared improvement through learning, grounding and deep breathing skills  Herlinda Crowder for the time at [employer] and stated that the program helped her structure her time, which helped with anxiety  She stated that school would help her do the same thing  Denied SI, HI, and psychosis  Aftercare providers to receive summary  Medications changes/added/denied? No    Treatment session number: na    Individual Case Management Visit provided today? Yes     [facility] follow up physician's orders: Discharge today

## 2020-08-21 NOTE — PROGRESS NOTES
Assessment/Plan:       Diagnoses and all orders for this visit:     Generalized anxiety disorder with panic attacks     Chronic post-traumatic stress disorder (PTSD)     Severe episode of recurrent major depressive disorder, without psychotic features ([facility] [address] )     Mixed obsessional thoughts and acts            Subjective:      Patient ID: Jenny Rodrigues is a 25 y o  female      [facility] Treatment Plan   AREAS OF NEED: Depression, anxiety, and obsessional thoughts as evidenced by patient complaints, too much, too little sleep, difficulty socializing, poor hygiene, as related to poor physical health, poor social supports and poor feelings of self worth  Date Initiated: 08/07/20        Strengths: Trying to get better, self-discovery               LONG TERM GOAL:   Date Initiated: 08/07/20  1 0 I will identify and share three ways in which my day-to-day functioning has improved since attending program   Target Date: 9/4/2020  Completion Date: 08/21/20 - discharge              SHORT TERM OBJECTIVES:      Date Initiated: 08/07/20  1 1 I will learn three new copings skills to cope with depression and anxiety symptoms and practice at least one on a daily basis    Revision Date: 8/19/2020  Target Date: 8/28/2020  Completion Date:      Date Initiated: 08/07/20  1 2 I will make a short and simple to do lists and complete three tasks each day  Revision Date:  8/19/2020  Target Date: 8/28/2020  Completion Date:     Date Initiated: 08/07/20  1 3 I will take medications as prescribed and share questions and concerns if arise     Revision Date: 8/19/2020  Target Date: 8/28/2020  Completion Date:      Date Initiated: 08/07/20  1 4 I will identify 3 ways my supports can assist in my recovery and agree to staff/support contact as indicated     Revision Date: 8/19/2020  Target Date: 8/28/2020  Completion Date:                7 DAY REVISION:  1 5 I will develop a mood journal to begin to track and explore my moods and compare them to my daily activities, blood sugars and other possible triggers to my mood liability     Date Initiated: 08/19/20  Revision Date:   Target Date: 8/28/2020  Completion Date:        PSYCHIATRY:  Date Initiated:  08/07/20  Medication Management and Education       Revision Date: 8/19/2020  The person(s) responsible for carrying out the plan is Shira Lao MD     NURSING:   Date Initiated: 08/07/20  1 1,1 2,1 3,1 4 This RN will provide daily wellness group five days weekly to educate Jenny Ravi on S/S of her diagnoses and medications used in treatment  Revision Date:8/19/2020  The person(s) responsible for carrying out the plan is Liz Alonso RN     PSYCHOLOGY:   Date Initiated: 08/07/20       1 1, 1 2, 1 4 Provide psychotherapy group 5 times per week to allow opportunity for Kimberleeflakitaamanda Osler explore stressors and ways of coping  Revision Date: 8/19/2020  The person(s) responsible for carrying out the plan is Esme Dorado MA, LPC     ALLIED THERAPY:   Date Initiated: 08/07/20  1 1,1 2 Engage Jenny Ravi in AT group 5 times daily to encourage development and use of wellness tools to decrease symptoms and promote recovery through meaningful activity  Revision Date: 8/19/2020  The person(s) responsible for carrying out the plan is CHAY Seaman      CASE MANAGEMENT:   Date Initiated: 08/07/20      1 0 This  will meet with Anita Troncoso times weekly to assess treatment progress, discharge planning, connection to community supports and UR as indicated    Revision Date: 8/19/2020  The person(s) responsible for carrying out the plan is Carol Wong RN        TREATMENT REVIEW/COMMENTS:      DISCHARGE CRITERIA: Identify 3 signs of progress and complete relapse prevention plan     DISCHARGE PLAN: Return to OP  Estimated Length of Stay: 10 treatment days         Diagnosis and Treatment Plan explained to Jenny Alvarado relates understanding diagnosis and is agreeable to Treatment Plan             CLIENT COMMENTS / Please share your thoughts, feelings, need and/or experiences regarding your treatment plan: _____________________________________________________________________________________________________________________________________________________________________________________________________________________________________________________________________________________________________________________ Date/Time: ______________      Patient Signature: _________________________________      Date/Time: ______________       Signature: _________________________________      Date/Time: ______________

## 2020-08-26 ENCOUNTER — TELEMEDICINE (OUTPATIENT)
Dept: BEHAVIORAL/MENTAL HEALTH CLINIC | Facility: CLINIC | Age: 25
End: 2020-08-26
Payer: COMMERCIAL

## 2020-08-26 DIAGNOSIS — F41.1 GENERALIZED ANXIETY DISORDER WITH PANIC ATTACKS: ICD-10-CM

## 2020-08-26 DIAGNOSIS — F42.2 MIXED OBSESSIONAL THOUGHTS AND ACTS: ICD-10-CM

## 2020-08-26 DIAGNOSIS — F43.12 CHRONIC POST-TRAUMATIC STRESS DISORDER (PTSD): Primary | ICD-10-CM

## 2020-08-26 DIAGNOSIS — F33.2 SEVERE EPISODE OF RECURRENT MAJOR DEPRESSIVE DISORDER, WITHOUT PSYCHOTIC FEATURES (HCC): ICD-10-CM

## 2020-08-26 DIAGNOSIS — F41.0 GENERALIZED ANXIETY DISORDER WITH PANIC ATTACKS: ICD-10-CM

## 2020-08-26 PROCEDURE — 90834 PSYTX W PT 45 MINUTES: CPT | Performed by: SOCIAL WORKER

## 2020-08-26 NOTE — PSYCH
Virtual Regular Visit      Assessment/Plan:    Problem List Items Addressed This Visit        Other    Severe episode of recurrent major depressive disorder, without psychotic features (HCC)    Chronic post-traumatic stress disorder (PTSD) - Primary    Generalized anxiety disorder with panic attacks    OCD (obsessive compulsive disorder)               Reason for visit is No chief complaint on file  Encounter provider Carly Jiménez    Provider located at [address] [facility]  [address]  [facility] [address]      Recent Visits  No visits were found meeting these conditions  Showing recent visits within past 7 days and meeting all other requirements     Future Appointments  No visits were found meeting these conditions  Showing future appointments within next 150 days and meeting all other requirements        The patient was identified by name and date of birth  Herman Jama was informed that this is a telemedicine visit and that the visit is being conducted through [employer]  My office door was closed  No one else was in the room  She acknowledged consent and understanding of privacy and security of the video platform  The patient has agreed to participate and understands they can discontinue the visit at any time  Patient is aware this is a billable service  Subjective  Herman Jama is a 23 y o  female     D: Met with BODØ individually  Session focused upon PHP completion, current medications and current symptoms  Discussed stuttering  BODØ explained she has stuttered for years but when her anxiety increased she can't 'cover for it'  Awaiting call from Neurology  Stayed at a hotel for 2 weeks prior to finding a townhouse  Feels new place is 'ok' - older but neighborhood better  Relationship with Meagan Mota has gotten better - feels since ER and PHP Meagan Mota has come to realize VAL needs help    Meagan Mota has taken more responsibility and becoming more of an advocate between Rhoda Gregory and Chris's sister  Denied SI    A: Rhoda Gregory presented with brighter affect but unfocused with frequent stuttering and loss for words  Rhoda Gregory is very self conscious of this  P: Continue individual therapy   HPI     Past Medical History:   Diagnosis Date    Abdominal pain     Anxiety     Anxiety and depression     Depression     Diabetes ([facility] [address] )     type 1    Diabetes mellitus ([facility] [address] ) 1/17/2019    Eating disorder     history of anorexia/bulemia [id number]    Fracture of fibula     R Salter I    Hashimoto's disease 2/21/2020    Head injury     Nasal congestion     Obsessive-compulsive disorder     PTSD (post-traumatic stress disorder)     Rectal bleed        Past Surgical History:   Procedure Laterality Date    KNEE SURGERY Right 07/06/2020    NASAL SEPTOPLASTY W/ TURBINOPLASTY      WISDOM TOOTH EXTRACTION      WRIST SURGERY      left;  Excision of ganglion       Current Outpatient Medications   Medication Sig Dispense Refill    albuterol (PROVENTIL HFA,VENTOLIN HFA) 90 mcg/act inhaler Inhale 1 puff every 6 (six) hours as needed for wheezing or shortness of breath 1 Inhaler 5    Blood Glucose Monitoring Suppl (ONETOUCH VERIO) w/Device KIT Use as directed  0    cholecalciferol (VITAMIN D3) 1,000 units tablet Take 4,000 Units by mouth daily      clonazePAM (KlonoPIN) 0 5 mg tablet Take 1 tablet (0 5 mg total) by mouth 2 (two) times a day as needed for anxiety 30 tablet 1    divalproex sodium (DEPAKOTE) 250 mg EC tablet Take 1 tablet (250 mg total) by mouth daily with breakfast 30 tablet 1    EPINEPHrine (EPIPEN) 0 3 mg/0 3 mL SOAJ Inject 0 3 mL (0 3 mg total) into a muscle once for 1 dose 0 6 mL 0    escitalopram (LEXAPRO) 5 mg tablet Take 1 tablet (5 mg total) by mouth daily 30 tablet 0    gabapentin (NEURONTIN) 100 mg capsule TAKE THREE CAPSULES BY MOUTH EVERY DAY AT BEDTIME 90 capsule 4    glucagon (GLUCAGON EMERGENCY) 1 MG injection Inject 1 mg under the skin once as needed for low blood sugar for up to 2 doses 1 kit 1    insulin aspart (NovoLOG) 100 Units/mL injection pen Inject 3 Units under the skin 3 (three) times a day with meals 5 pen 1    insulin aspart (NovoLOG) 100 units/mL injection Use 30 units per day via pump Disp 1 vial per month 30 mL 1    insulin degludec Ronna Lands FlexTouch) 100 units/mL injection pen Inject 35 Units under the skin daily at bedtime 8 units daily (Patient not taking: Reported on 8/7/2020) 5 pen 1    Insulin Infusion Pump KIT 13 mm cannula, 23 inch tubing change site every 3 days      Insulin Pen Needle (BD PEN NEEDLE NASIM U/F) 32G X 4 MM MISC by Does not apply route 4 (four) times a day for 180 days 400 each 3    Levonorgestrel-Ethinyl Estrad (ALTAVERA PO) Take 1 tablet by mouth      Multiple Vitamin (DAILY VALUE MULTIVITAMIN) TABS Take by mouth      ONETOUCH DELICA LANCETS 11C MISC Patient test 6 times daily 600 each 1    ONETOUCH VERIO test strip Test six times a day 600 each 3     No current facility-administered medications for this visit  Allergies   Allergen Reactions    Insulin Glargine      Burning and redness under skin    Iodides Throat Swelling and Swelling     Reaction Date: 28Jul2014; Action Taken: none; Category: Allergy; Reaction Date: 28Jul2014; Action Taken: none; Category: Allergy;     Iodine          I spent 50 minutes directly with the patient during this visit      VIRTUAL VISIT DISCLAIMER    Ashly Trammell acknowledges that she has consented to an online visit or consultation  She understands that the online visit is based solely on information provided by her, and that, in the absence of a face-to-face physical evaluation by the physician, the diagnosis she receives is both limited and provisional in terms of accuracy and completeness  This is not intended to replace a full medical face-to-face evaluation by the physician   Ashly Trammell understands and accepts these terms

## 2020-09-02 ENCOUNTER — SOCIAL WORK (OUTPATIENT)
Dept: BEHAVIORAL/MENTAL HEALTH CLINIC | Facility: CLINIC | Age: 25
End: 2020-09-02
Payer: COMMERCIAL

## 2020-09-02 DIAGNOSIS — F41.1 GENERALIZED ANXIETY DISORDER WITH PANIC ATTACKS: ICD-10-CM

## 2020-09-02 DIAGNOSIS — F33.2 SEVERE EPISODE OF RECURRENT MAJOR DEPRESSIVE DISORDER, WITHOUT PSYCHOTIC FEATURES (HCC): ICD-10-CM

## 2020-09-02 DIAGNOSIS — F42.2 MIXED OBSESSIONAL THOUGHTS AND ACTS: ICD-10-CM

## 2020-09-02 DIAGNOSIS — F43.12 CHRONIC POST-TRAUMATIC STRESS DISORDER (PTSD): Primary | ICD-10-CM

## 2020-09-02 DIAGNOSIS — F41.0 GENERALIZED ANXIETY DISORDER WITH PANIC ATTACKS: ICD-10-CM

## 2020-09-02 PROCEDURE — 90834 PSYTX W PT 45 MINUTES: CPT | Performed by: SOCIAL WORKER

## 2020-09-02 NOTE — PSYCH
Psychotherapy Provided: Individual Psychotherapy 50 minutes     Length of time in session: 50 minutes, follow up in 1 week    Goals addressed in session: Goal 1, Goal 2 and Goal 3      Pain:      none    0    Current suicide risk : Low     D: Met with Jenny individually  Back down to Lexapro 5mg as she couldn't tolerate 10mg  Sent a staff message to Andrea Rendon for a refill  Forgot about Gabapentin so will resume it  Forgetfulness continues with anxiety and isolation due to stuttering and forgetting what she is talking about  Argument with sister - mom observed BODØ 'slipping away', stuttering and 'flaring her arms/hands' when she clearly became overwhelmed during argument  Mom felt BODØ should come in person to session to have therapist observe more than a video may  BODØ is going to see PHP tomorrow - strongly encouraged BODØ to discuss with her  Therapist sent a staff message to Mabel Oscar to inform her of symptoms and to see if she could get Neurology sooner due to physical symptoms of muscle weakness, forgetfulness etc   BODØ was in agreement  Further discussion of fight with sister and multiple texts from dad expressing sister 'is having a hard time due to Jenny's refusal to speak to her right now  Denied SI    A: BODØ presented anxious, rocking and struggled to recall her conversation several times  Seeing PHP tomorrow  Able to identify some positive plans/goals  Continues to go to PT and taking meds  P: Continue individual therapy    Behavioral Health Treatment Plan [facility]: Diagnosis and Treatment Plan explained to Zahida Castillo relates understanding diagnosis and is agreeable to Treatment Plan   Yes

## 2020-09-03 ENCOUNTER — OFFICE VISIT (OUTPATIENT)
Dept: INTERNAL MEDICINE CLINIC | Facility: CLINIC | Age: 25
End: 2020-09-03
Payer: COMMERCIAL

## 2020-09-03 VITALS
DIASTOLIC BLOOD PRESSURE: 78 MMHG | SYSTOLIC BLOOD PRESSURE: 122 MMHG | BODY MASS INDEX: 27.11 KG/M2 | HEIGHT: 61 IN | OXYGEN SATURATION: 98 % | TEMPERATURE: 99 F | WEIGHT: 143.6 LBS | HEART RATE: 77 BPM | RESPIRATION RATE: 16 BRPM

## 2020-09-03 DIAGNOSIS — R47.89 WORD FINDING DIFFICULTY: ICD-10-CM

## 2020-09-03 DIAGNOSIS — F41.1 GENERALIZED ANXIETY DISORDER WITH PANIC ATTACKS: ICD-10-CM

## 2020-09-03 DIAGNOSIS — F33.2 SEVERE EPISODE OF RECURRENT MAJOR DEPRESSIVE DISORDER, WITHOUT PSYCHOTIC FEATURES (HCC): ICD-10-CM

## 2020-09-03 DIAGNOSIS — E10.9 TYPE 1 DIABETES MELLITUS WITHOUT COMPLICATION (HCC): ICD-10-CM

## 2020-09-03 DIAGNOSIS — M62.81 MUSCLE WEAKNESS: ICD-10-CM

## 2020-09-03 DIAGNOSIS — G62.9 NEUROPATHY: Primary | ICD-10-CM

## 2020-09-03 DIAGNOSIS — F41.0 GENERALIZED ANXIETY DISORDER WITH PANIC ATTACKS: ICD-10-CM

## 2020-09-03 PROCEDURE — 3725F SCREEN DEPRESSION PERFORMED: CPT | Performed by: NURSE PRACTITIONER

## 2020-09-03 PROCEDURE — 99214 OFFICE O/P EST MOD 30 MIN: CPT | Performed by: NURSE PRACTITIONER

## 2020-09-03 RX ORDER — METHOCARBAMOL 500 MG/1
500 TABLET, FILM COATED ORAL DAILY PRN
COMMUNITY
End: 2021-03-18

## 2020-09-03 RX ORDER — ESCITALOPRAM OXALATE 5 MG/1
5 TABLET ORAL DAILY
Qty: 30 TABLET | Refills: 1 | Status: SHIPPED | OUTPATIENT
Start: 2020-09-03 | End: 2020-09-11

## 2020-09-03 NOTE — ASSESSMENT & PLAN NOTE
Pt is experiencing difficulties with her speech in which she has trouble with word finding and stuttering as she searches for her words  She is having difficulty with cognition in general with poor concentration  It is not clear if this problem is neurological or symptoms of her psychiatric conditions  Psychiatry seems to feel that there is an underlying neurological concern and she is scheduled to see neurology in November  We did attempt to help her get an appointment sooner, but we were not successful  Her neuro exam does demonstrate slight R sided weakness and difficulty with tandem gate  Will await evaluation but pt should call if symptoms worsen

## 2020-09-03 NOTE — ASSESSMENT & PLAN NOTE
Pt completed 2 week partial hospitalization and mood remains inadequately controlled  She does not tolerate higher doses of escitalopram due to nausea  She did finally start the depakote after getting clarification on the orders a few days ago but has not yet noticed a change in mood  She is scheduled to see a new psychiatrist this week and will continue to work with them on managing her symptoms

## 2020-09-03 NOTE — ASSESSMENT & PLAN NOTE
Pt has been noticing onset of weakness in her hands which has created challenges with fine motor tasks such as opening jars, using tools  She also reports weakness in the leg muscles, feeling as though basic movements are a struggle, or like her muscles are "dying "  Her exam does demonstrate strength of 3/5 in the R leg 4/5 in the L leg and 4/5 in the R hand, 5/5 in the L hand  The rest of her neuro exam is unremarkable  Pt is awaiting consultation with neurology

## 2020-09-03 NOTE — PROGRESS NOTES
Assessment/Plan:    Type 1 diabetes mellitus without complication (HCC)  Blood sugars have been well controlled recently, she is managed by  endocrinology  She is currently using an insulin pump and uses tresiba and aspart  Continue with routine monitoring of a1c, bmp, and microalbuminuria  Lab Results   Component Value Date    HGBA1C 7 8 (H) 12/06/2019       Neuropathy  Pt has been experiencing numbness/tingling in the LLE  At last visit, started pt on gabapentin at bedtime  She states she does not always remember to take this and she does not feel like it has been making a difference  I told her that if she is not experiencing any benefit, there is no reason to continue to take it  Awaiting neuro evaluation for any additional recommendations  Severe episode of recurrent major depressive disorder, without psychotic features ([facility] [address] )  Pt completed 2 week partial hospitalization and mood remains inadequately controlled  She does not tolerate higher doses of escitalopram due to nausea  She did finally start the depakote after getting clarification on the orders a few days ago but has not yet noticed a change in mood  She is scheduled to see a new psychiatrist this week and will continue to work with them on managing her symptoms  Generalized anxiety disorder with panic attacks  After recent partial hospitalization, medications have been changed  She is now using escitalopram 5 mg but experiences associated nausea  She did not tolerate 10 mg dosing  She also started taking depakote a few days ago though this has not yet made a noticeable improvement  She is no longer taking lorazepam and was switched to clonazepam   She takes this as needed when she feels that her symptoms are starting to increase  She feels this helps by way of causing her to be tired and fall asleep  She continues with counseling and is scheduled with a new psychiatrist this week    Continue to work with them to find effective treatment  Muscle weakness  Pt has been noticing onset of weakness in her hands which has created challenges with fine motor tasks such as opening jars, using tools  She also reports weakness in the leg muscles, feeling as though basic movements are a struggle, or like her muscles are "dying "  Her exam does demonstrate strength of 3/5 in the R leg 4/5 in the L leg and 4/5 in the R hand, 5/5 in the L hand  The rest of her neuro exam is unremarkable  Pt is awaiting consultation with neurology  Word finding difficulty  Pt is experiencing difficulties with her speech in which she has trouble with word finding and stuttering as she searches for her words  She is having difficulty with cognition in general with poor concentration  It is not clear if this problem is neurological or symptoms of her psychiatric conditions  Psychiatry seems to feel that there is an underlying neurological concern and she is scheduled to see neurology in November  We did attempt to help her get an appointment sooner, but we were not successful  Her neuro exam does demonstrate slight R sided weakness and difficulty with tandem gate  Will await evaluation but pt should call if symptoms worsen  Diagnoses and all orders for this visit:    Neuropathy    Type 1 diabetes mellitus without complication (HCC)    Severe episode of recurrent major depressive disorder, without psychotic features (HCC)    Generalized anxiety disorder with panic attacks    Muscle weakness    Word finding difficulty    Other orders  -     methocarbamol (ROBAXIN) 500 mg tablet; Take 500 mg by mouth daily as needed for muscle spasms          Subjective:      Patient ID: Papa Mcnamara is a 23 y o  female  Pt is a 22 y/o female here for f/u visit  Since her last visit, pt has participated in the partial hospitalization for management of psychological condition    She has had deteriorating mental health and was unable to be admitted to inpatient psychiatric unit due to her complex medical history  She established with psychiatry while participating in this program and is now taking Depakote, escitalopram, and clonazepam   She just started the depakote because until a few days ago she did not know that it could be taken whenever she wakes up  She has a lot of nausea due to the esciatlopram and the dose was decreased from 10 mg to 5 mg  The clonazepam is being used as needed and she said that it helps by way of making her tired and she falls asleep  She continues to report a worsening picture of her mental health  She has a difficult time with her memory and word finding, stuttering often as she tries to "get her words out "  She is doing poorly in school because she can not maintain her focus or have clear thoughts  She states she varies between sleeping for 12 hours at at time and going several days without sleep or on little sleep  Regardless of sleep she always feels tired  She is struggling with PT since her R knee procedure and complains about muscle weakness both in the leg and in her hands/arms  She says at times taking a simple step makes her feel like her "muscle is dying "  She finds it too difficult to open bottles/jars  Her diabetes is stable, her sugars have been in the 90s  She does not eat much, though she states she does not lose weight  She is now on BC due to amenorrhea but has yet to get a period  Her hair continues to fall out  The following portions of the patient's history were reviewed and updated as appropriate: allergies, current medications, past family history, past medical history, past social history, past surgical history and problem list     Review of Systems   Constitutional: Positive for activity change, appetite change and fatigue  Negative for chills, fever and unexpected weight change  Eyes: Positive for visual disturbance (flashes of light in the lower half of her visual field)     Respiratory: Negative for chest tightness and shortness of breath  Cardiovascular: Negative for chest pain and palpitations  Gastrointestinal: Negative for nausea and vomiting  Genitourinary: Positive for menstrual problem  Negative for difficulty urinating, dysuria and frequency  Musculoskeletal: Positive for arthralgias and myalgias  Neurological: Positive for speech difficulty and weakness  Negative for dizziness, tremors, seizures, syncope, light-headedness and numbness  Psychiatric/Behavioral: Positive for confusion, decreased concentration, dysphoric mood and sleep disturbance  Negative for behavioral problems, hallucinations, self-injury and suicidal ideas  The patient is nervous/anxious  Objective:      /78   Pulse 77   Temp 99 °F (37 2 °C)   Resp 16   Ht 5' 1" (1 549 m)   Wt 65 1 kg (143 lb 9 6 oz)   SpO2 98%   BMI 27 13 kg/m²          Physical Exam  Vitals signs reviewed  Constitutional:       Appearance: Normal appearance  She is well-developed  Eyes:      General: Lids are normal       Extraocular Movements: Extraocular movements intact  Conjunctiva/sclera: Conjunctivae normal       Pupils: Pupils are equal, round, and reactive to light  Neck:      Thyroid: No thyromegaly  Vascular: Normal carotid pulses  No carotid bruit or JVD  Cardiovascular:      Rate and Rhythm: Normal rate and regular rhythm  Pulses: Normal pulses  Heart sounds: Normal heart sounds  No murmur  Pulmonary:      Effort: Pulmonary effort is normal  No respiratory distress  Breath sounds: Normal breath sounds  Abdominal:      General: Bowel sounds are normal       Palpations: Abdomen is soft  Tenderness: There is no abdominal tenderness  Musculoskeletal: Normal range of motion  Right lower leg: No edema  Left lower leg: No edema  Lymphadenopathy:      Cervical: No cervical adenopathy  Skin:     General: Skin is warm and dry     Neurological:      Mental Status: She is alert and oriented to person, place, and time  Cranial Nerves: Cranial nerves are intact  Sensory: Sensation is intact  Motor: Weakness present  No abnormal muscle tone or pronator drift  Coordination: Romberg sign negative  Coordination normal  Finger-Nose-Finger Test and Heel to Pati Lyman Test normal       Deep Tendon Reflexes:      Reflex Scores:       Brachioradialis reflexes are 2+ on the right side and 2+ on the left side  Patellar reflexes are 3+ on the right side and 3+ on the left side  Comments: Generalized weakness noted  Pt has difficulty with tandem gait  Psychiatric:         Attention and Perception: Attention normal          Mood and Affect: Mood is anxious and depressed  Behavior: Behavior normal  Behavior is cooperative  Thought Content:  Thought content normal          Cognition and Memory: Cognition normal          Judgment: Judgment normal       Comments: Stuttering and difficulty word finding

## 2020-09-03 NOTE — ASSESSMENT & PLAN NOTE
After recent partial hospitalization, medications have been changed  She is now using escitalopram 5 mg but experiences associated nausea  She did not tolerate 10 mg dosing  She also started taking depakote a few days ago though this has not yet made a noticeable improvement  She is no longer taking lorazepam and was switched to clonazepam   She takes this as needed when she feels that her symptoms are starting to increase  She feels this helps by way of causing her to be tired and fall asleep  She continues with counseling and is scheduled with a new psychiatrist this week  Continue to work with them to find effective treatment

## 2020-09-03 NOTE — ASSESSMENT & PLAN NOTE
Blood sugars have been well controlled recently, she is managed by  endocrinology  She is currently using an insulin pump and uses tresiba and aspart  Continue with routine monitoring of a1c, bmp, and microalbuminuria    Lab Results   Component Value Date    HGBA1C 7 8 (H) 12/06/2019

## 2020-09-03 NOTE — ASSESSMENT & PLAN NOTE
Pt has been experiencing numbness/tingling in the LLE  At last visit, started pt on gabapentin at bedtime  She states she does not always remember to take this and she does not feel like it has been making a difference  I told her that if she is not experiencing any benefit, there is no reason to continue to take it  Awaiting neuro evaluation for any additional recommendations

## 2020-09-09 ENCOUNTER — TELEMEDICINE (OUTPATIENT)
Dept: BEHAVIORAL/MENTAL HEALTH CLINIC | Facility: CLINIC | Age: 25
End: 2020-09-09
Payer: COMMERCIAL

## 2020-09-09 DIAGNOSIS — F33.2 SEVERE EPISODE OF RECURRENT MAJOR DEPRESSIVE DISORDER, WITHOUT PSYCHOTIC FEATURES (HCC): ICD-10-CM

## 2020-09-09 DIAGNOSIS — F41.0 GENERALIZED ANXIETY DISORDER WITH PANIC ATTACKS: Primary | ICD-10-CM

## 2020-09-09 DIAGNOSIS — F42.2 MIXED OBSESSIONAL THOUGHTS AND ACTS: ICD-10-CM

## 2020-09-09 DIAGNOSIS — F41.1 GENERALIZED ANXIETY DISORDER WITH PANIC ATTACKS: Primary | ICD-10-CM

## 2020-09-09 DIAGNOSIS — F43.12 CHRONIC POST-TRAUMATIC STRESS DISORDER (PTSD): ICD-10-CM

## 2020-09-09 PROCEDURE — 90834 PSYTX W PT 45 MINUTES: CPT | Performed by: SOCIAL WORKER

## 2020-09-09 NOTE — BH TREATMENT PLAN
Doris Dick  1995       Date of Initial Treatment Plan: 8/12/19  Date of Current Treatment Plan: 9/9/20       Treatment Plan Number 4     Strengths/Personal Resources for Self Care: I'm a good student, intelligent, resilient     Diagnosis:   1  Chronic post-traumatic stress disorder (PTSD)      2  Generalized anxiety disorder with panic attacks      3  Mixed obsessional thoughts and acts      4  Severe episode of recurrent major depressive disorder, without psychotic features ([facility] [address] )           Area of Needs: Trauma, 'I'm a hypochondriac", intimacy, self worth, complex medical issues     Long Term Goal 1:   I have address my trauma  Target Date: 3/1/21  Completion Date:  TBD     Short Term Objectives for Goal 1:   1  I no longer live in fear  2  I have forgiven my mom  3  I can be in neighborhoods without panic or flashbacks  4  I have more self worth     Long Term Goal 2:   My medical issues are under control  Target Date: 3/1/21  Completion Date:  TBD     Short Term Objectives for Goal 2:    1  Rafael Rodriguez is fully trained as a diabetic service dog   2  Getting my MRI        6  I have my independence     Long Term Goal 3:   My anxiety and depression have diminished  Target Date: 3/1/21  Completion Date:  TBD     Short Term Objectives for Goal 3:    1 Utilize my skills   2  My stomach issues/Diabetes   3   Find an effective med for depression/anxiety            GOAL 1: Modality: Individual Therapy 1x/week   Completion Date: TBD                                  Medication management 1x/month   Completion Date: TBD                                  Individuals responsible for goals: Yane Alvarado and Dr Jemal Best 2: Modality:I ndividual Therapy 1x/week   Completion Date: TBD                                   Medication management 1x/month   Completion Date: TBD                                   Individuals responsible for goals: So Saldana and Dr Harry Sterns: Modality:I ndividual Therapy 1x/week   Completion Date: TBD                                   Medication management 1x/month   Completion Date: TBD                                   Individuals responsible for goals: Yane Alvarado and Dr Rodrigo Merlin: Diagnosis and Treatment Plan explained to Janeen Tari relates understanding diagnosis and is agreeable to Treatment Plan         Client Comments : Please share your thoughts, feelings, need and/or experiences regarding your treatment plan:
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
## 2020-09-11 ENCOUNTER — OFFICE VISIT (OUTPATIENT)
Dept: PSYCHIATRY | Facility: CLINIC | Age: 25
End: 2020-09-11
Payer: COMMERCIAL

## 2020-09-11 DIAGNOSIS — F42.2 MIXED OBSESSIONAL THOUGHTS AND ACTS: ICD-10-CM

## 2020-09-11 DIAGNOSIS — F41.0 GENERALIZED ANXIETY DISORDER WITH PANIC ATTACKS: ICD-10-CM

## 2020-09-11 DIAGNOSIS — F43.10 PTSD (POST-TRAUMATIC STRESS DISORDER): ICD-10-CM

## 2020-09-11 DIAGNOSIS — F41.1 GENERALIZED ANXIETY DISORDER WITH PANIC ATTACKS: ICD-10-CM

## 2020-09-11 DIAGNOSIS — F33.2 SEVERE EPISODE OF RECURRENT MAJOR DEPRESSIVE DISORDER, WITHOUT PSYCHOTIC FEATURES (HCC): Primary | ICD-10-CM

## 2020-09-11 PROCEDURE — 90791 PSYCH DIAGNOSTIC EVALUATION: CPT | Performed by: PSYCHIATRY & NEUROLOGY

## 2020-09-11 RX ORDER — DESVENLAFAXINE 50 MG/1
50 TABLET, EXTENDED RELEASE ORAL DAILY
Qty: 30 TABLET | Refills: 2 | Status: SHIPPED | OUTPATIENT
Start: 2020-09-11 | End: 2020-12-14

## 2020-09-11 RX ORDER — ARIPIPRAZOLE 2 MG/1
2 TABLET ORAL DAILY
Qty: 30 TABLET | Refills: 2 | Status: SHIPPED | OUTPATIENT
Start: 2020-09-11 | End: 2020-10-13 | Stop reason: SDUPTHER
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
## 2020-09-16 ENCOUNTER — TELEMEDICINE (OUTPATIENT)
Dept: BEHAVIORAL/MENTAL HEALTH CLINIC | Facility: CLINIC | Age: 25
End: 2020-09-16
Payer: COMMERCIAL

## 2020-09-16 DIAGNOSIS — F33.2 SEVERE EPISODE OF RECURRENT MAJOR DEPRESSIVE DISORDER, WITHOUT PSYCHOTIC FEATURES (HCC): ICD-10-CM

## 2020-09-16 DIAGNOSIS — F41.1 GENERALIZED ANXIETY DISORDER WITH PANIC ATTACKS: ICD-10-CM

## 2020-09-16 DIAGNOSIS — F41.0 GENERALIZED ANXIETY DISORDER WITH PANIC ATTACKS: ICD-10-CM

## 2020-09-16 DIAGNOSIS — F42.2 MIXED OBSESSIONAL THOUGHTS AND ACTS: ICD-10-CM

## 2020-09-16 DIAGNOSIS — F43.12 CHRONIC POST-TRAUMATIC STRESS DISORDER (PTSD): Primary | ICD-10-CM

## 2020-09-16 PROCEDURE — 90834 PSYTX W PT 45 MINUTES: CPT | Performed by: SOCIAL WORKER

## 2020-09-16 NOTE — PSYCH
Virtual Regular Visit      Assessment/Plan:    Problem List Items Addressed This Visit        Other    Severe episode of recurrent major depressive disorder, without psychotic features (HCC)    Chronic post-traumatic stress disorder (PTSD) - Primary    Generalized anxiety disorder with panic attacks    OCD (obsessive compulsive disorder)               Reason for visit is No chief complaint on file  Encounter provider Elvi Valle    Provider located at [address] [facility]  [address]  [facility] [address]      Recent Visits  Date Type Provider Dept   09/09/20 [facility] Therapist   Showing recent visits within past 7 days and meeting all other requirements     Future Appointments  No visits were found meeting these conditions  Showing future appointments within next 150 days and meeting all other requirements        The patient was identified by name and date of birth  Tristan Sesay was informed that this is a telemedicine visit and that the visit is being conducted through [employer]  My office door was closed  No one else was in the room  She acknowledged consent and understanding of privacy and security of the video platform  The patient has agreed to participate and understands they can discontinue the visit at any time  Patient is aware this is a billable service  Subjective  Tristan Sesay is a 23 y o  female    D: Met with Jd Rivas individually  Was just discharged from [facility] for DKA and infection  Discussed symptoms leading up to the hospital admission  Expressed people were pretty nice accept for 1 person  Mother and sister came 1 each day  Jd Rivas stated visit with sister went well 'It was like nothing was ever wrong  I was ok with that '  Acknowledged 'not trusting it' as sister's mood fluctuate  Knee is very swollen - not sure if due to IV fluids   "I still feel bad but not as bad as she was '  Tyree Fermin and Elvin Munoz have dinner reservations as it's his birthday  'I really want to go even though it hasn't been a few good days '  Off from school for another week per Dr Brittany Gonzalez  Then has 2 extra weeks to make up work  Denied SI    A: Elvin Munoz presented tired but more relaxed due to Klonopin  Despite her nausea, Elvin Munoz continued to take Pristiq and feels more comfortable on it now  P: Continue individual therapy      HPI     Past Medical History:   Diagnosis Date    Abdominal pain     Anxiety     Anxiety and depression     Depression     Diabetes ([facility] [address] )     type 1    Diabetes mellitus ([facility] [address] ) 1/17/2019    Eating disorder     history of anorexia/bulemia [id number]    Fracture of fibula     R Salter I    Hashimoto's disease 2/21/2020    Head injury     Nasal congestion     Obsessive-compulsive disorder     PTSD (post-traumatic stress disorder)     Rectal bleed        Past Surgical History:   Procedure Laterality Date    KNEE SURGERY Right 07/06/2020    NASAL SEPTOPLASTY W/ TURBINOPLASTY      WISDOM TOOTH EXTRACTION      WRIST SURGERY      left;  Excision of ganglion       Current Outpatient Medications   Medication Sig Dispense Refill    albuterol (PROVENTIL HFA,VENTOLIN HFA) 90 mcg/act inhaler Inhale 1 puff every 6 (six) hours as needed for wheezing or shortness of breath 1 Inhaler 5    ARIPiprazole (ABILIFY) 2 mg tablet Take 1 tablet (2 mg total) by mouth daily 30 tablet 2    Blood Glucose Monitoring Suppl (ONETOUCH VERIO) w/Device KIT Use as directed  0    cholecalciferol (VITAMIN D3) 1,000 units tablet Take 4,000 Units by mouth daily      clonazePAM (KlonoPIN) 0 5 mg tablet Take 1 tablet (0 5 mg total) by mouth 2 (two) times a day as needed for anxiety 30 tablet 1    desvenlafaxine succinate (PRISTIQ) 50 mg 24 hr tablet Take 1 tablet (50 mg total) by mouth daily 30 tablet 2    EPINEPHrine (EPIPEN) 0 3 mg/0 3 mL SOAJ Inject 0 3 mL (0 3 mg total) into a muscle once for 1 dose 0 6 mL 0    gabapentin (NEURONTIN) 100 mg capsule TAKE THREE CAPSULES BY MOUTH EVERY DAY AT BEDTIME 90 capsule 4    glucagon (GLUCAGON EMERGENCY) 1 MG injection Inject 1 mg under the skin once as needed for low blood sugar for up to 2 doses 1 kit 1    insulin aspart (NovoLOG) 100 Units/mL injection pen Inject 3 Units under the skin 3 (three) times a day with meals 5 pen 1    insulin aspart (NovoLOG) 100 units/mL injection Use 30 units per day via pump Disp 1 vial per month (Patient not taking: Reported on 9/3/2020) 30 mL 1    insulin degludec (Tresiba FlexTouch) 100 units/mL injection pen Inject 35 Units under the skin daily at bedtime 8 units daily 5 pen 1    Insulin Infusion Pump KIT 13 mm cannula, 23 inch tubing change site every 3 days      Insulin Pen Needle (BD PEN NEEDLE NASMI U/F) 32G X 4 MM MISC by Does not apply route 4 (four) times a day for 180 days 400 each 3    Levonorgestrel-Ethinyl Estrad (ALTAVERA PO) Take 1 tablet by mouth      methocarbamol (ROBAXIN) 500 mg tablet Take 500 mg by mouth daily as needed for muscle spasms      Multiple Vitamin (DAILY VALUE MULTIVITAMIN) TABS Take by mouth      ONETOUCH DELICA LANCETS 21Q MISC Patient test 6 times daily 600 each 1    ONETOUCH VERIO test strip Test six times a day 600 each 3     No current facility-administered medications for this visit  Allergies   Allergen Reactions    Insulin Glargine      Burning and redness under skin    Iodides Throat Swelling and Swelling     Reaction Date: 28Jul2014; Action Taken: none; Category: Allergy; Reaction Date: 28Jul2014; Action Taken: none; Category: Allergy;     Iodine        I spent 50 minutes directly with the patient during this visit      VIRTUAL VISIT DISCLAIMER    Mandi Gillette acknowledges that she has consented to an online visit or consultation   She understands that the online visit is based solely on information provided by her, and that, in the absence of a face-to-face physical evaluation by the physician, the diagnosis she receives is both limited and provisional in terms of accuracy and completeness  This is not intended to replace a full medical face-to-face evaluation by the physician  Reji Sox understands and accepts these terms

## 2020-09-23 ENCOUNTER — TELEMEDICINE (OUTPATIENT)
Dept: BEHAVIORAL/MENTAL HEALTH CLINIC | Facility: CLINIC | Age: 25
End: 2020-09-23
Payer: COMMERCIAL

## 2020-09-23 DIAGNOSIS — F41.1 GENERALIZED ANXIETY DISORDER WITH PANIC ATTACKS: ICD-10-CM

## 2020-09-23 DIAGNOSIS — F33.2 SEVERE EPISODE OF RECURRENT MAJOR DEPRESSIVE DISORDER, WITHOUT PSYCHOTIC FEATURES (HCC): ICD-10-CM

## 2020-09-23 DIAGNOSIS — F41.0 GENERALIZED ANXIETY DISORDER WITH PANIC ATTACKS: ICD-10-CM

## 2020-09-23 DIAGNOSIS — F42.2 MIXED OBSESSIONAL THOUGHTS AND ACTS: ICD-10-CM

## 2020-09-23 DIAGNOSIS — F43.12 CHRONIC POST-TRAUMATIC STRESS DISORDER (PTSD): Primary | ICD-10-CM

## 2020-09-23 PROCEDURE — 90834 PSYTX W PT 45 MINUTES: CPT | Performed by: SOCIAL WORKER

## 2020-09-23 NOTE — PSYCH
Virtual Regular Visit      Assessment/Plan:    Problem List Items Addressed This Visit        Other    Severe episode of recurrent major depressive disorder, without psychotic features (HCC)    Chronic post-traumatic stress disorder (PTSD) - Primary    Generalized anxiety disorder with panic attacks    OCD (obsessive compulsive disorder)               Reason for visit is No chief complaint on file  Encounter provider Mere Delgado    Provider located at [address] [facility]  [address]  [facility] [address]      Recent Visits  Date Type Provider Dept   09/16/20 [facility] Therapist   Showing recent visits within past 7 days and meeting all other requirements     Future Appointments  No visits were found meeting these conditions  Showing future appointments within next 150 days and meeting all other requirements        The patient was identified by name and date of birth  Ancelmo Aceves was informed that this is a telemedicine visit and that the visit is being conducted through [employer]  My office door was closed  No one else was in the room  She acknowledged consent and understanding of privacy and security of the video platform  The patient has agreed to participate and understands they can discontinue the visit at any time  Patient is aware this is a billable service  Subjective  Ancelmo Aceves is a 23 y o  female    D: Met with Elvin Munoz individually  Elvin Munoz expressed 'I feel I am loosing control some how; 'feels she has so much energy'  is is a new observation for me (to this level)  Expressed she has to control herself from 'making these movements with my arms and legs'  States she has had this Symptom for 'years and years' but was able to control it then it went away  Has vivid memories of these movements as she was taunted by kids for them  Sleeping 4 hrs  Impulsive - just bought a [employer]  Completing house work, doing outside errands  Feels she is on 'to many cups of coffee'  Discussed possibility of mood change feeling 'foreign' due to being so depressed for almost 2 years but its way to early for her med to be effective yet  Then says she is still 'very depressed'  Her MO is to return to depressive state then sleep 12 plus hours   Therapist is familiar with this  Always felt 'she didn't belong' growing up'  A: Cheli Edna presented tearful and anxious today  Rocking and quite animated today by her arms flaring when discussing more anxiety triggering topic/discussions  I feel she began to grieve some today yet she is demonstrating regression in behaviors associated with her historic trauma  P: Continue individual therapy      HPI     Past Medical History:   Diagnosis Date    Abdominal pain     Anxiety     Anxiety and depression     Depression     Diabetes ([facility] )     type 1    Diabetes mellitus ([facility] ) 1/17/2019    Eating disorder     history of anorexia/bulemia [id number]    Fracture of fibula     R Salter I    Hashimoto's disease 2/21/2020    Head injury     Nasal congestion     Obsessive-compulsive disorder     PTSD (post-traumatic stress disorder)     Rectal bleed        Past Surgical History:   Procedure Laterality Date    KNEE SURGERY Right 07/06/2020    NASAL SEPTOPLASTY W/ TURBINOPLASTY      WISDOM TOOTH EXTRACTION      WRIST SURGERY      left;  Excision of ganglion       Current Outpatient Medications   Medication Sig Dispense Refill    albuterol (PROVENTIL HFA,VENTOLIN HFA) 90 mcg/act inhaler Inhale 1 puff every 6 (six) hours as needed for wheezing or shortness of breath 1 Inhaler 5    ARIPiprazole (ABILIFY) 2 mg tablet Take 1 tablet (2 mg total) by mouth daily 30 tablet 2    Blood Glucose Monitoring Suppl (ONETOUCH VERIO) w/Device KIT Use as directed  0    cholecalciferol (VITAMIN D3) 1,000 units tablet Take 4,000 Units by mouth daily      clonazePAM (KlonoPIN) 0 5 mg tablet Take 1 tablet (0 5 mg total) by mouth 2 (two) times a day as needed for anxiety 30 tablet 1    desvenlafaxine succinate (PRISTIQ) 50 mg 24 hr tablet Take 1 tablet (50 mg total) by mouth daily 30 tablet 2    EPINEPHrine (EPIPEN) 0 3 mg/0 3 mL SOAJ Inject 0 3 mL (0 3 mg total) into a muscle once for 1 dose 0 6 mL 0    gabapentin (NEURONTIN) 100 mg capsule TAKE THREE CAPSULES BY MOUTH EVERY DAY AT BEDTIME 90 capsule 4    glucagon (GLUCAGON EMERGENCY) 1 MG injection Inject 1 mg under the skin once as needed for low blood sugar for up to 2 doses 1 kit 1    insulin aspart (NovoLOG) 100 Units/mL injection pen Inject 3 Units under the skin 3 (three) times a day with meals 5 pen 1    insulin aspart (NovoLOG) 100 units/mL injection Use 30 units per day via pump Disp 1 vial per month (Patient not taking: Reported on 9/3/2020) 30 mL 1    insulin degludec (Tresiba FlexTouch) 100 units/mL injection pen Inject 35 Units under the skin daily at bedtime 8 units daily 5 pen 1    Insulin Infusion Pump KIT 13 mm cannula, 23 inch tubing change site every 3 days      Insulin Pen Needle (BD PEN NEEDLE NASIM U/F) 32G X 4 MM MISC by Does not apply route 4 (four) times a day for 180 days 400 each 3    Levonorgestrel-Ethinyl Estrad (ALTAVERA PO) Take 1 tablet by mouth      methocarbamol (ROBAXIN) 500 mg tablet Take 500 mg by mouth daily as needed for muscle spasms      Multiple Vitamin (DAILY VALUE MULTIVITAMIN) TABS Take by mouth      ONETOUCH DELICA LANCETS 67J MISC Patient test 6 times daily 600 each 1    ONETOUCH VERIO test strip Test six times a day 600 each 3     No current facility-administered medications for this visit  Allergies   Allergen Reactions    Insulin Glargine      Burning and redness under skin    Iodides Throat Swelling and Swelling     Reaction Date: 28Jul2014; Action Taken: none; Category: Allergy; Reaction Date: 28Jul2014; Action Taken: none; Category:  Allergy;     Iodine        I spent 50 minutes directly with the patient during this visit      VIRTUAL VISIT DISCLAIMER    Arcelia Villa acknowledges that she has consented to an online visit or consultation  She understands that the online visit is based solely on information provided by her, and that, in the absence of a face-to-face physical evaluation by the physician, the diagnosis she receives is both limited and provisional in terms of accuracy and completeness  This is not intended to replace a full medical face-to-face evaluation by the physician  Arcelia Villa understands and accepts these terms

## 2020-09-26 DIAGNOSIS — Z30.41 ORAL CONTRACEPTIVE PILL SURVEILLANCE: ICD-10-CM

## 2020-09-28 RX ORDER — LEVONORGESTREL AND ETHINYL ESTRADIOL 0.15-0.03
KIT ORAL
Qty: 84 TABLET | Refills: 3 | Status: SHIPPED | OUTPATIENT
Start: 2020-09-28 | End: 2021-11-02

## 2020-09-30 ENCOUNTER — TELEMEDICINE (OUTPATIENT)
Dept: BEHAVIORAL/MENTAL HEALTH CLINIC | Facility: CLINIC | Age: 25
End: 2020-09-30
Payer: COMMERCIAL

## 2020-09-30 DIAGNOSIS — F41.1 GENERALIZED ANXIETY DISORDER WITH PANIC ATTACKS: ICD-10-CM

## 2020-09-30 DIAGNOSIS — F42.2 MIXED OBSESSIONAL THOUGHTS AND ACTS: ICD-10-CM

## 2020-09-30 DIAGNOSIS — F43.12 CHRONIC POST-TRAUMATIC STRESS DISORDER (PTSD): Primary | ICD-10-CM

## 2020-09-30 DIAGNOSIS — F41.0 GENERALIZED ANXIETY DISORDER WITH PANIC ATTACKS: ICD-10-CM

## 2020-09-30 DIAGNOSIS — F33.2 SEVERE EPISODE OF RECURRENT MAJOR DEPRESSIVE DISORDER, WITHOUT PSYCHOTIC FEATURES (HCC): ICD-10-CM

## 2020-09-30 PROCEDURE — 90834 PSYTX W PT 45 MINUTES: CPT | Performed by: SOCIAL WORKER

## 2020-09-30 NOTE — PSYCH
Virtual Regular Visit      Assessment/Plan:    Problem List Items Addressed This Visit        Other    Severe episode of recurrent major depressive disorder, without psychotic features (HCC)    Chronic post-traumatic stress disorder (PTSD) - Primary    Generalized anxiety disorder with panic attacks    OCD (obsessive compulsive disorder)               Reason for visit is No chief complaint on file  Encounter provider Domonique Pillai    Provider located at [id number] [place] [address] [address]  [facility] [address]      Recent Visits  Date Type Provider Dept   09/23/20 [facility] Therapist   Showing recent visits within past 7 days and meeting all other requirements     Today's Visits  Date Type Provider Dept   09/30/20 [facility] Therapist   Showing today's visits and meeting all other requirements     Future Appointments  No visits were found meeting these conditions  Showing future appointments within next 150 days and meeting all other requirements        The patient was identified by name and date of birth  Claire Vicente was informed that this is a telemedicine visit and that the visit is being conducted through [employer]  My office door was closed  No one else was in the room  She acknowledged consent and understanding of privacy and security of the video platform  The patient has agreed to participate and understands they can discontinue the visit at any time  Patient is aware this is a billable service  Subjective  Claire Vicente is a 23 y o  female    D: Met with Minna Haines individually  'I don't feel as crazy but my depression is still really present'   Mood fluctuations remain 'last week I felt like I was to high now this week I feel I'm to low and sluggish '  Feels reaction time is off  'I burnt myself on the stove and didn't feel it at first '  Relationship with Heather Palmyra is improving - practicing emotional intimacy, having brief conversations and eating together at the kitchen table  Discussed this feeling foreign and Martín Damon doesn't want to trust the improvement as 'it may go back down again soon enough ' Caught up with all school work  A: Martín Damon presented with slight improvement this week - showering, grooming self, caught up on school work  Focus and concentration remain an obstacle    P: Continue individual therapy    HPI     Past Medical History:   Diagnosis Date    Abdominal pain     Anxiety     Anxiety and depression     Depression     Diabetes ([facility] )     type 1    Diabetes mellitus ([facility] ) 1/17/2019    Eating disorder     history of anorexia/bulemia [id number]    Fracture of fibula     R Salter I    Hashimoto's disease 2/21/2020    Head injury     Nasal congestion     Obsessive-compulsive disorder     PTSD (post-traumatic stress disorder)     Rectal bleed        Past Surgical History:   Procedure Laterality Date    KNEE SURGERY Right 07/06/2020    NASAL SEPTOPLASTY W/ TURBINOPLASTY      WISDOM TOOTH EXTRACTION      WRIST SURGERY      left;  Excision of ganglion       Current Outpatient Medications   Medication Sig Dispense Refill    albuterol (PROVENTIL HFA,VENTOLIN HFA) 90 mcg/act inhaler Inhale 1 puff every 6 (six) hours as needed for wheezing or shortness of breath 1 Inhaler 5    Altavera 0 15-30 MG-MCG per tablet TAKE ONE TABLET BY MOUTH EVERY DAY 84 tablet 3    ARIPiprazole (ABILIFY) 2 mg tablet Take 1 tablet (2 mg total) by mouth daily 30 tablet 2    Blood Glucose Monitoring Suppl (ONETOUCH VERIO) w/Device KIT Use as directed  0    cholecalciferol (VITAMIN D3) 1,000 units tablet Take 4,000 Units by mouth daily      clonazePAM (KlonoPIN) 0 5 mg tablet Take 1 tablet (0 5 mg total) by mouth 2 (two) times a day as needed for anxiety 30 tablet 1    desvenlafaxine succinate (PRISTIQ) 50 mg 24 hr tablet Take 1 tablet (50 mg total) by mouth daily 30 tablet 2    EPINEPHrine (EPIPEN) 0 3 mg/0 3 mL SOAJ Inject 0 3 mL (0 3 mg total) into a muscle once for 1 dose 0 6 mL 0    gabapentin (NEURONTIN) 100 mg capsule TAKE THREE CAPSULES BY MOUTH EVERY DAY AT BEDTIME 90 capsule 4    glucagon (GLUCAGON EMERGENCY) 1 MG injection Inject 1 mg under the skin once as needed for low blood sugar for up to 2 doses 1 kit 1    insulin aspart (NovoLOG) 100 Units/mL injection pen Inject 3 Units under the skin 3 (three) times a day with meals 5 pen 1    insulin aspart (NovoLOG) 100 units/mL injection Use 30 units per day via pump Disp 1 vial per month (Patient not taking: Reported on 9/3/2020) 30 mL 1    insulin degludec (Tresiba FlexTouch) 100 units/mL injection pen Inject 35 Units under the skin daily at bedtime 8 units daily 5 pen 1    Insulin Infusion Pump KIT 13 mm cannula, 23 inch tubing change site every 3 days      Insulin Pen Needle (BD PEN NEEDLE NASIM U/F) 32G X 4 MM MISC by Does not apply route 4 (four) times a day for 180 days 400 each 3    Levonorgestrel-Ethinyl Estrad (ALTAVERA PO) Take 1 tablet by mouth      methocarbamol (ROBAXIN) 500 mg tablet Take 500 mg by mouth daily as needed for muscle spasms      Multiple Vitamin (DAILY VALUE MULTIVITAMIN) TABS Take by mouth      ONETOUCH DELICA LANCETS 21E MISC Patient test 6 times daily 600 each 1    ONETOUCH VERIO test strip Test six times a day 600 each 3     No current facility-administered medications for this visit  Allergies   Allergen Reactions    Insulin Glargine      Burning and redness under skin    Iodides Throat Swelling and Swelling     Reaction Date: 28Jul2014; Action Taken: none; Category: Allergy; Reaction Date: 28Jul2014; Action Taken: none; Category: Allergy;     Iodine      I spent 50 minutes directly with the patient during this visit      VIRTUAL VISIT DISCLAIMER    Osmany Tyrell acknowledges that she has consented to an online visit or consultation   She understands that the online visit is based solely on information provided by her, and that, in the absence of a face-to-face physical evaluation by the physician, the diagnosis she receives is both limited and provisional in terms of accuracy and completeness  This is not intended to replace a full medical face-to-face evaluation by the physician  Brendan Bojorquez understands and accepts these terms

## 2020-10-08 DIAGNOSIS — G62.9 NEUROPATHY: ICD-10-CM

## 2020-10-08 DIAGNOSIS — M62.81 MUSCLE WEAKNESS: Primary | ICD-10-CM

## 2020-10-14 ENCOUNTER — TELEMEDICINE (OUTPATIENT)
Dept: BEHAVIORAL/MENTAL HEALTH CLINIC | Facility: CLINIC | Age: 25
End: 2020-10-14
Payer: COMMERCIAL

## 2020-10-14 DIAGNOSIS — F33.2 SEVERE EPISODE OF RECURRENT MAJOR DEPRESSIVE DISORDER, WITHOUT PSYCHOTIC FEATURES (HCC): ICD-10-CM

## 2020-10-14 DIAGNOSIS — F41.0 GENERALIZED ANXIETY DISORDER WITH PANIC ATTACKS: ICD-10-CM

## 2020-10-14 DIAGNOSIS — F43.12 CHRONIC POST-TRAUMATIC STRESS DISORDER (PTSD): Primary | ICD-10-CM

## 2020-10-14 DIAGNOSIS — F42.2 MIXED OBSESSIONAL THOUGHTS AND ACTS: ICD-10-CM

## 2020-10-14 DIAGNOSIS — F41.1 GENERALIZED ANXIETY DISORDER WITH PANIC ATTACKS: ICD-10-CM

## 2020-10-14 PROCEDURE — 90834 PSYTX W PT 45 MINUTES: CPT | Performed by: SOCIAL WORKER

## 2020-10-15 ENCOUNTER — TELEPHONE (OUTPATIENT)
Dept: PSYCHIATRY | Facility: CLINIC | Age: 25
End: 2020-10-15

## 2020-10-16 ENCOUNTER — OFFICE VISIT (OUTPATIENT)
Dept: PSYCHIATRY | Facility: CLINIC | Age: 25
End: 2020-10-16
Payer: COMMERCIAL

## 2020-10-16 ENCOUNTER — CONSULT (OUTPATIENT)
Dept: NEUROLOGY | Facility: CLINIC | Age: 25
End: 2020-10-16
Payer: COMMERCIAL

## 2020-10-16 VITALS
HEART RATE: 94 BPM | TEMPERATURE: 98.1 F | BODY MASS INDEX: 27.19 KG/M2 | WEIGHT: 144 LBS | SYSTOLIC BLOOD PRESSURE: 113 MMHG | DIASTOLIC BLOOD PRESSURE: 75 MMHG | HEIGHT: 61 IN

## 2020-10-16 DIAGNOSIS — R55 SYNCOPE, UNSPECIFIED SYNCOPE TYPE: ICD-10-CM

## 2020-10-16 DIAGNOSIS — F41.0 GENERALIZED ANXIETY DISORDER WITH PANIC ATTACKS: ICD-10-CM

## 2020-10-16 DIAGNOSIS — F30.8 HYPOMANIA (HCC): Primary | ICD-10-CM

## 2020-10-16 DIAGNOSIS — M62.81 MUSCLE WEAKNESS: ICD-10-CM

## 2020-10-16 DIAGNOSIS — G62.9 NEUROPATHY: ICD-10-CM

## 2020-10-16 DIAGNOSIS — R29.818 TRANSIENT NEUROLOGIC DEFICIT: Primary | ICD-10-CM

## 2020-10-16 DIAGNOSIS — F41.1 GENERALIZED ANXIETY DISORDER WITH PANIC ATTACKS: ICD-10-CM

## 2020-10-16 DIAGNOSIS — R41.0 CONFUSION: ICD-10-CM

## 2020-10-16 PROBLEM — N91.1 SECONDARY AMENORRHEA: Status: ACTIVE | Noted: 2020-06-23

## 2020-10-16 PROBLEM — G89.29 CHRONIC BACK PAIN: Status: ACTIVE | Noted: 2020-10-16

## 2020-10-16 PROBLEM — M54.9 CHRONIC BACK PAIN: Status: ACTIVE | Noted: 2020-10-16

## 2020-10-16 PROBLEM — Z96.41 INSULIN PUMP STATUS: Status: ACTIVE | Noted: 2020-09-30

## 2020-10-16 PROCEDURE — 99213 OFFICE O/P EST LOW 20 MIN: CPT | Performed by: PSYCHIATRY & NEUROLOGY

## 2020-10-16 PROCEDURE — 1036F TOBACCO NON-USER: CPT | Performed by: STUDENT IN AN ORGANIZED HEALTH CARE EDUCATION/TRAINING PROGRAM

## 2020-10-16 PROCEDURE — 99245 OFF/OP CONSLTJ NEW/EST HI 55: CPT | Performed by: STUDENT IN AN ORGANIZED HEALTH CARE EDUCATION/TRAINING PROGRAM

## 2020-10-16 RX ORDER — ARIPIPRAZOLE 2 MG/1
2 TABLET ORAL DAILY
Qty: 30 TABLET | Refills: 2 | Status: SHIPPED | OUTPATIENT
Start: 2020-10-16 | End: 2020-12-14

## 2020-10-16 RX ORDER — CLONAZEPAM 0.5 MG/1
0.5 TABLET ORAL 2 TIMES DAILY PRN
Qty: 60 TABLET | Refills: 2 | Status: SHIPPED | OUTPATIENT
Start: 2020-10-16 | End: 2021-01-05 | Stop reason: SDUPTHER

## 2020-10-16 RX ORDER — DIVALPROEX SODIUM 500 MG/1
500 TABLET, EXTENDED RELEASE ORAL DAILY
Qty: 30 TABLET | Refills: 2 | Status: SHIPPED | OUTPATIENT
Start: 2020-10-16 | End: 2021-01-05 | Stop reason: SDUPTHER

## 2020-10-19 ENCOUNTER — TELEPHONE (OUTPATIENT)
Dept: NEUROLOGY | Facility: CLINIC | Age: 25
End: 2020-10-19

## 2020-10-19 DIAGNOSIS — R29.818 TRANSIENT NEUROLOGIC DEFICIT: Primary | ICD-10-CM

## 2020-10-19 DIAGNOSIS — R55 SYNCOPE, UNSPECIFIED SYNCOPE TYPE: ICD-10-CM

## 2020-10-20 LAB
B BURGDOR AB SER IA-ACNC: <0.9 INDEX
CK MB CFR SERPL ELPH: <0.7 NG/ML (ref 0–5)
CK SERPL-CCNC: 74 U/L (ref 29–143)
FOLATE SERPL-MCNC: 11.6 NG/ML
VIT B12 SERPL-MCNC: 358 PG/ML (ref 200–1100)

## 2020-10-28 ENCOUNTER — TELEMEDICINE (OUTPATIENT)
Dept: BEHAVIORAL/MENTAL HEALTH CLINIC | Facility: CLINIC | Age: 25
End: 2020-10-28
Payer: COMMERCIAL

## 2020-10-28 DIAGNOSIS — F41.0 GENERALIZED ANXIETY DISORDER WITH PANIC ATTACKS: ICD-10-CM

## 2020-10-28 DIAGNOSIS — F42.2 MIXED OBSESSIONAL THOUGHTS AND ACTS: ICD-10-CM

## 2020-10-28 DIAGNOSIS — F33.2 SEVERE EPISODE OF RECURRENT MAJOR DEPRESSIVE DISORDER, WITHOUT PSYCHOTIC FEATURES (HCC): ICD-10-CM

## 2020-10-28 DIAGNOSIS — F41.1 GENERALIZED ANXIETY DISORDER WITH PANIC ATTACKS: ICD-10-CM

## 2020-10-28 DIAGNOSIS — F43.12 CHRONIC POST-TRAUMATIC STRESS DISORDER (PTSD): Primary | ICD-10-CM

## 2020-10-28 PROCEDURE — 90834 PSYTX W PT 45 MINUTES: CPT | Performed by: SOCIAL WORKER

## 2020-10-30 ENCOUNTER — HOSPITAL ENCOUNTER (OUTPATIENT)
Dept: NEUROLOGY | Facility: HOSPITAL | Age: 25
Discharge: HOME | End: 2020-10-30
Attending: STUDENT IN AN ORGANIZED HEALTH CARE EDUCATION/TRAINING PROGRAM
Payer: COMMERCIAL

## 2020-10-30 DIAGNOSIS — R29.818 TRANSIENT NEUROLOGIC DEFICIT: ICD-10-CM

## 2020-10-30 DIAGNOSIS — R55 SYNCOPE, UNSPECIFIED SYNCOPE TYPE: ICD-10-CM

## 2020-10-30 PROCEDURE — 95819 EEG AWAKE AND ASLEEP: CPT

## 2020-11-02 PROCEDURE — 95816 EEG AWAKE AND DROWSY: CPT | Performed by: PSYCHIATRY & NEUROLOGY

## 2020-11-04 ENCOUNTER — TELEMEDICINE (OUTPATIENT)
Dept: BEHAVIORAL/MENTAL HEALTH CLINIC | Facility: CLINIC | Age: 25
End: 2020-11-04
Payer: COMMERCIAL

## 2020-11-04 DIAGNOSIS — F43.12 CHRONIC POST-TRAUMATIC STRESS DISORDER (PTSD): Primary | ICD-10-CM

## 2020-11-04 DIAGNOSIS — F41.0 GENERALIZED ANXIETY DISORDER WITH PANIC ATTACKS: ICD-10-CM

## 2020-11-04 DIAGNOSIS — F33.2 SEVERE EPISODE OF RECURRENT MAJOR DEPRESSIVE DISORDER, WITHOUT PSYCHOTIC FEATURES (HCC): ICD-10-CM

## 2020-11-04 DIAGNOSIS — F42.2 MIXED OBSESSIONAL THOUGHTS AND ACTS: ICD-10-CM

## 2020-11-04 DIAGNOSIS — F41.1 GENERALIZED ANXIETY DISORDER WITH PANIC ATTACKS: ICD-10-CM

## 2020-11-04 PROCEDURE — 90834 PSYTX W PT 45 MINUTES: CPT | Performed by: SOCIAL WORKER

## 2020-11-11 ENCOUNTER — TELEMEDICINE (OUTPATIENT)
Dept: BEHAVIORAL/MENTAL HEALTH CLINIC | Facility: CLINIC | Age: 25
End: 2020-11-11
Payer: COMMERCIAL

## 2020-11-11 DIAGNOSIS — F41.0 GENERALIZED ANXIETY DISORDER WITH PANIC ATTACKS: ICD-10-CM

## 2020-11-11 DIAGNOSIS — F43.12 CHRONIC POST-TRAUMATIC STRESS DISORDER (PTSD): Primary | ICD-10-CM

## 2020-11-11 DIAGNOSIS — F42.2 MIXED OBSESSIONAL THOUGHTS AND ACTS: ICD-10-CM

## 2020-11-11 DIAGNOSIS — F41.1 GENERALIZED ANXIETY DISORDER WITH PANIC ATTACKS: ICD-10-CM

## 2020-11-11 DIAGNOSIS — F33.2 SEVERE EPISODE OF RECURRENT MAJOR DEPRESSIVE DISORDER, WITHOUT PSYCHOTIC FEATURES (HCC): ICD-10-CM

## 2020-11-11 PROCEDURE — 90834 PSYTX W PT 45 MINUTES: CPT | Performed by: SOCIAL WORKER

## 2020-11-18 ENCOUNTER — TELEMEDICINE (OUTPATIENT)
Dept: BEHAVIORAL/MENTAL HEALTH CLINIC | Facility: CLINIC | Age: 25
End: 2020-11-18
Payer: COMMERCIAL

## 2020-11-18 DIAGNOSIS — F33.2 SEVERE EPISODE OF RECURRENT MAJOR DEPRESSIVE DISORDER, WITHOUT PSYCHOTIC FEATURES (HCC): ICD-10-CM

## 2020-11-18 DIAGNOSIS — F41.1 GENERALIZED ANXIETY DISORDER WITH PANIC ATTACKS: ICD-10-CM

## 2020-11-18 DIAGNOSIS — F41.0 GENERALIZED ANXIETY DISORDER WITH PANIC ATTACKS: ICD-10-CM

## 2020-11-18 DIAGNOSIS — F43.12 CHRONIC POST-TRAUMATIC STRESS DISORDER (PTSD): Primary | ICD-10-CM

## 2020-11-18 DIAGNOSIS — F42.2 MIXED OBSESSIONAL THOUGHTS AND ACTS: ICD-10-CM

## 2020-11-18 PROCEDURE — 90834 PSYTX W PT 45 MINUTES: CPT | Performed by: SOCIAL WORKER

## 2020-11-24 ENCOUNTER — HOSPITAL ENCOUNTER (OUTPATIENT)
Dept: RADIOLOGY | Facility: HOSPITAL | Age: 25
Discharge: HOME | End: 2020-11-24
Attending: STUDENT IN AN ORGANIZED HEALTH CARE EDUCATION/TRAINING PROGRAM
Payer: COMMERCIAL

## 2020-11-24 DIAGNOSIS — R29.818 TRANSIENT NEUROLOGIC DEFICIT: ICD-10-CM

## 2020-11-24 PROCEDURE — 70551 MRI BRAIN STEM W/O DYE: CPT

## 2020-11-24 PROCEDURE — G1004 CDSM NDSC: HCPCS

## 2020-11-30 ENCOUNTER — TELEPHONE (OUTPATIENT)
Dept: NEUROLOGY | Facility: CLINIC | Age: 25
End: 2020-11-30

## 2020-11-30 DIAGNOSIS — R29.818 TRANSIENT NEUROLOGIC DEFICIT: Primary | ICD-10-CM

## 2020-12-02 ENCOUNTER — TELEMEDICINE (OUTPATIENT)
Dept: BEHAVIORAL/MENTAL HEALTH CLINIC | Facility: CLINIC | Age: 25
End: 2020-12-02
Payer: COMMERCIAL

## 2020-12-02 DIAGNOSIS — F43.12 CHRONIC POST-TRAUMATIC STRESS DISORDER (PTSD): Primary | ICD-10-CM

## 2020-12-02 DIAGNOSIS — F41.0 GENERALIZED ANXIETY DISORDER WITH PANIC ATTACKS: ICD-10-CM

## 2020-12-02 DIAGNOSIS — F42.2 MIXED OBSESSIONAL THOUGHTS AND ACTS: ICD-10-CM

## 2020-12-02 DIAGNOSIS — F33.2 SEVERE EPISODE OF RECURRENT MAJOR DEPRESSIVE DISORDER, WITHOUT PSYCHOTIC FEATURES (HCC): ICD-10-CM

## 2020-12-02 DIAGNOSIS — F41.1 GENERALIZED ANXIETY DISORDER WITH PANIC ATTACKS: ICD-10-CM

## 2020-12-02 PROCEDURE — 90834 PSYTX W PT 45 MINUTES: CPT | Performed by: SOCIAL WORKER

## 2020-12-04 ENCOUNTER — OFFICE VISIT (OUTPATIENT)
Dept: OBGYN CLINIC | Facility: CLINIC | Age: 25
End: 2020-12-04
Payer: COMMERCIAL

## 2020-12-04 VITALS — SYSTOLIC BLOOD PRESSURE: 102 MMHG | DIASTOLIC BLOOD PRESSURE: 60 MMHG | WEIGHT: 155.8 LBS | BODY MASS INDEX: 29.44 KG/M2

## 2020-12-04 DIAGNOSIS — G62.9 NEUROPATHY: ICD-10-CM

## 2020-12-04 DIAGNOSIS — N94.819 VULVODYNIA: Primary | ICD-10-CM

## 2020-12-04 DIAGNOSIS — B37.3 VULVOVAGINITIS DUE TO YEAST: ICD-10-CM

## 2020-12-04 PROCEDURE — 1036F TOBACCO NON-USER: CPT | Performed by: OBSTETRICS & GYNECOLOGY

## 2020-12-04 PROCEDURE — 99214 OFFICE O/P EST MOD 30 MIN: CPT | Performed by: OBSTETRICS & GYNECOLOGY

## 2020-12-04 RX ORDER — FLUCONAZOLE 150 MG/1
TABLET ORAL
COMMUNITY
Start: 2020-11-05 | End: 2020-12-21

## 2020-12-04 RX ORDER — CLOTRIMAZOLE AND BETAMETHASONE DIPROPIONATE 10; .64 MG/G; MG/G
CREAM TOPICAL AS NEEDED
COMMUNITY
Start: 2020-12-01 | End: 2021-03-18

## 2020-12-09 ENCOUNTER — TELEMEDICINE (OUTPATIENT)
Dept: BEHAVIORAL/MENTAL HEALTH CLINIC | Facility: CLINIC | Age: 25
End: 2020-12-09
Payer: COMMERCIAL

## 2020-12-09 DIAGNOSIS — F41.0 GENERALIZED ANXIETY DISORDER WITH PANIC ATTACKS: ICD-10-CM

## 2020-12-09 DIAGNOSIS — F43.12 CHRONIC POST-TRAUMATIC STRESS DISORDER (PTSD): Primary | ICD-10-CM

## 2020-12-09 DIAGNOSIS — F33.2 SEVERE EPISODE OF RECURRENT MAJOR DEPRESSIVE DISORDER, WITHOUT PSYCHOTIC FEATURES (HCC): ICD-10-CM

## 2020-12-09 DIAGNOSIS — F41.1 GENERALIZED ANXIETY DISORDER WITH PANIC ATTACKS: ICD-10-CM

## 2020-12-09 DIAGNOSIS — F42.2 MIXED OBSESSIONAL THOUGHTS AND ACTS: ICD-10-CM

## 2020-12-09 PROCEDURE — 90834 PSYTX W PT 45 MINUTES: CPT | Performed by: SOCIAL WORKER

## 2020-12-11 ENCOUNTER — OFFICE VISIT (OUTPATIENT)
Dept: PSYCHIATRY | Facility: CLINIC | Age: 25
End: 2020-12-11
Payer: COMMERCIAL

## 2020-12-11 DIAGNOSIS — F42.2 MIXED OBSESSIONAL THOUGHTS AND ACTS: Primary | ICD-10-CM

## 2020-12-11 DIAGNOSIS — F31.0 BIPOLAR DISORDER, CURRENT EPISODE HYPOMANIC (HCC): ICD-10-CM

## 2020-12-11 DIAGNOSIS — F41.1 GENERALIZED ANXIETY DISORDER WITH PANIC ATTACKS: ICD-10-CM

## 2020-12-11 DIAGNOSIS — F43.12 CHRONIC POST-TRAUMATIC STRESS DISORDER (PTSD): ICD-10-CM

## 2020-12-11 DIAGNOSIS — F41.0 GENERALIZED ANXIETY DISORDER WITH PANIC ATTACKS: ICD-10-CM

## 2020-12-11 PROBLEM — N89.8 VAGINAL DISCHARGE: Status: ACTIVE | Noted: 2020-12-01

## 2020-12-11 PROBLEM — N94.819 VULVODYNIA: Status: ACTIVE | Noted: 2020-12-01

## 2020-12-11 PROBLEM — N76.2 VULVAR CELLULITIS: Status: ACTIVE | Noted: 2020-11-04

## 2020-12-11 PROBLEM — B37.9 YEAST INFECTION: Status: ACTIVE | Noted: 2020-12-01

## 2020-12-11 PROBLEM — N76.0 VULVOVAGINITIS: Status: ACTIVE | Noted: 2020-11-03

## 2020-12-11 PROCEDURE — 99213 OFFICE O/P EST LOW 20 MIN: CPT | Performed by: PSYCHIATRY & NEUROLOGY

## 2020-12-11 RX ORDER — DIVALPROEX SODIUM 250 MG/1
250 TABLET, EXTENDED RELEASE ORAL
Qty: 30 TABLET | Refills: 2 | Status: SHIPPED | OUTPATIENT
Start: 2020-12-11 | End: 2021-04-13 | Stop reason: SDUPTHER

## 2020-12-14 DIAGNOSIS — F42.2 MIXED OBSESSIONAL THOUGHTS AND ACTS: ICD-10-CM

## 2020-12-14 DIAGNOSIS — F43.10 PTSD (POST-TRAUMATIC STRESS DISORDER): ICD-10-CM

## 2020-12-14 DIAGNOSIS — F30.8 HYPOMANIA (HCC): ICD-10-CM

## 2020-12-14 DIAGNOSIS — F41.1 GENERALIZED ANXIETY DISORDER WITH PANIC ATTACKS: ICD-10-CM

## 2020-12-14 DIAGNOSIS — F41.0 GENERALIZED ANXIETY DISORDER WITH PANIC ATTACKS: ICD-10-CM

## 2020-12-14 DIAGNOSIS — F33.2 SEVERE EPISODE OF RECURRENT MAJOR DEPRESSIVE DISORDER, WITHOUT PSYCHOTIC FEATURES (HCC): ICD-10-CM

## 2020-12-14 RX ORDER — DESVENLAFAXINE 50 MG/1
TABLET, EXTENDED RELEASE ORAL
Qty: 30 TABLET | Refills: 2 | Status: SHIPPED | OUTPATIENT
Start: 2020-12-14 | End: 2021-04-13 | Stop reason: SDUPTHER

## 2020-12-14 RX ORDER — ARIPIPRAZOLE 2 MG/1
TABLET ORAL
Qty: 30 TABLET | Refills: 2 | Status: SHIPPED | OUTPATIENT
Start: 2020-12-14 | End: 2021-04-13

## 2020-12-16 ENCOUNTER — TELEMEDICINE (OUTPATIENT)
Dept: BEHAVIORAL/MENTAL HEALTH CLINIC | Facility: CLINIC | Age: 25
End: 2020-12-16
Payer: COMMERCIAL

## 2020-12-16 DIAGNOSIS — F41.0 GENERALIZED ANXIETY DISORDER WITH PANIC ATTACKS: ICD-10-CM

## 2020-12-16 DIAGNOSIS — F33.2 SEVERE EPISODE OF RECURRENT MAJOR DEPRESSIVE DISORDER, WITHOUT PSYCHOTIC FEATURES (HCC): ICD-10-CM

## 2020-12-16 DIAGNOSIS — F42.2 MIXED OBSESSIONAL THOUGHTS AND ACTS: ICD-10-CM

## 2020-12-16 DIAGNOSIS — F43.12 CHRONIC POST-TRAUMATIC STRESS DISORDER (PTSD): Primary | ICD-10-CM

## 2020-12-16 DIAGNOSIS — F41.1 GENERALIZED ANXIETY DISORDER WITH PANIC ATTACKS: ICD-10-CM

## 2020-12-16 PROCEDURE — 90834 PSYTX W PT 45 MINUTES: CPT | Performed by: SOCIAL WORKER

## 2020-12-21 ENCOUNTER — PROCEDURE VISIT (OUTPATIENT)
Dept: NEUROLOGY | Facility: CLINIC | Age: 25
End: 2020-12-21
Payer: COMMERCIAL

## 2020-12-21 DIAGNOSIS — G62.9 NEUROPATHY: ICD-10-CM

## 2020-12-21 DIAGNOSIS — M62.81 MUSCLE WEAKNESS: ICD-10-CM

## 2020-12-21 PROCEDURE — 95886 MUSC TEST DONE W/N TEST COMP: CPT | Performed by: PHYSICAL MEDICINE & REHABILITATION

## 2020-12-21 PROCEDURE — 95912 NRV CNDJ TEST 11-12 STUDIES: CPT | Performed by: PHYSICAL MEDICINE & REHABILITATION

## 2020-12-24 ENCOUNTER — OFFICE VISIT (OUTPATIENT)
Dept: OBGYN CLINIC | Facility: CLINIC | Age: 25
End: 2020-12-24
Payer: COMMERCIAL

## 2020-12-24 VITALS — WEIGHT: 152.2 LBS | SYSTOLIC BLOOD PRESSURE: 110 MMHG | DIASTOLIC BLOOD PRESSURE: 62 MMHG | BODY MASS INDEX: 28.76 KG/M2

## 2020-12-24 DIAGNOSIS — N94.819 VULVODYNIA: Primary | ICD-10-CM

## 2020-12-24 PROCEDURE — 99213 OFFICE O/P EST LOW 20 MIN: CPT | Performed by: OBSTETRICS & GYNECOLOGY

## 2020-12-24 PROCEDURE — 1036F TOBACCO NON-USER: CPT | Performed by: OBSTETRICS & GYNECOLOGY

## 2020-12-24 RX ORDER — AMITRIPTYLINE HYDROCHLORIDE 10 MG/1
5 TABLET, FILM COATED ORAL
Qty: 30 TABLET | Refills: 1 | Status: SHIPPED | OUTPATIENT
Start: 2020-12-24 | End: 2021-02-26

## 2020-12-24 RX ORDER — METHOCARBAMOL 500 MG/1
500 TABLET, FILM COATED ORAL 4 TIMES DAILY
COMMUNITY
End: 2021-03-09 | Stop reason: ALTCHOICE

## 2020-12-30 ENCOUNTER — TELEMEDICINE (OUTPATIENT)
Dept: BEHAVIORAL/MENTAL HEALTH CLINIC | Facility: CLINIC | Age: 25
End: 2020-12-30
Payer: COMMERCIAL

## 2020-12-30 ENCOUNTER — TELEPHONE (OUTPATIENT)
Dept: INTERNAL MEDICINE CLINIC | Facility: CLINIC | Age: 25
End: 2020-12-30

## 2020-12-30 DIAGNOSIS — F42.2 MIXED OBSESSIONAL THOUGHTS AND ACTS: ICD-10-CM

## 2020-12-30 DIAGNOSIS — F41.0 GENERALIZED ANXIETY DISORDER WITH PANIC ATTACKS: ICD-10-CM

## 2020-12-30 DIAGNOSIS — F33.2 SEVERE EPISODE OF RECURRENT MAJOR DEPRESSIVE DISORDER, WITHOUT PSYCHOTIC FEATURES (HCC): ICD-10-CM

## 2020-12-30 DIAGNOSIS — F43.12 CHRONIC POST-TRAUMATIC STRESS DISORDER (PTSD): Primary | ICD-10-CM

## 2020-12-30 DIAGNOSIS — F41.1 GENERALIZED ANXIETY DISORDER WITH PANIC ATTACKS: ICD-10-CM

## 2020-12-30 PROCEDURE — 90834 PSYTX W PT 45 MINUTES: CPT | Performed by: SOCIAL WORKER

## 2020-12-31 ENCOUNTER — TELEMEDICINE (OUTPATIENT)
Dept: INTERNAL MEDICINE CLINIC | Facility: CLINIC | Age: 25
End: 2020-12-31
Payer: COMMERCIAL

## 2020-12-31 VITALS — HEIGHT: 61 IN | BODY MASS INDEX: 27.19 KG/M2 | WEIGHT: 144 LBS

## 2020-12-31 DIAGNOSIS — U07.1 COVID-19: Primary | ICD-10-CM

## 2020-12-31 DIAGNOSIS — R05.9 COUGH: ICD-10-CM

## 2020-12-31 PROCEDURE — 3008F BODY MASS INDEX DOCD: CPT | Performed by: OBSTETRICS & GYNECOLOGY

## 2020-12-31 PROCEDURE — 99214 OFFICE O/P EST MOD 30 MIN: CPT | Performed by: INTERNAL MEDICINE

## 2020-12-31 RX ORDER — BENZONATATE 100 MG/1
100 CAPSULE ORAL 3 TIMES DAILY PRN
Qty: 30 CAPSULE | Refills: 1 | Status: SHIPPED | OUTPATIENT
Start: 2020-12-31 | End: 2021-01-07 | Stop reason: SDUPTHER

## 2020-12-31 RX ORDER — ONDANSETRON 4 MG/1
4 TABLET, ORALLY DISINTEGRATING ORAL EVERY 8 HOURS PRN
COMMUNITY
Start: 2020-12-29 | End: 2021-03-09 | Stop reason: ALTCHOICE

## 2021-01-04 ENCOUNTER — TELEMEDICINE (OUTPATIENT)
Dept: INTERNAL MEDICINE CLINIC | Facility: CLINIC | Age: 26
End: 2021-01-04
Payer: COMMERCIAL

## 2021-01-04 DIAGNOSIS — E10.65 TYPE 1 DIABETES MELLITUS WITH HYPERGLYCEMIA (HCC): Primary | ICD-10-CM

## 2021-01-04 DIAGNOSIS — U07.1 COVID-19: ICD-10-CM

## 2021-01-04 PROCEDURE — 99214 OFFICE O/P EST MOD 30 MIN: CPT | Performed by: NURSE PRACTITIONER

## 2021-01-04 NOTE — ASSESSMENT & PLAN NOTE
Pt states sugars have been very high since she developed covid 19, at times 600  Endo aware, sent her to the ER where she was treated with fluids  She says that sugars have finally started to come down a bit  She should continue with insulin and f/u with endo as scheduled    Lab Results   Component Value Date    HGBA1C 6 8 (H) 09/14/2020

## 2021-01-04 NOTE — ASSESSMENT & PLAN NOTE
Pt tested on 12/22 in preparation for surgery and came back positive  Sx started on 12/24  She states that condition showed improvement through the weekend  She continues with a slight cough and chest/nasal congestion  She becomes sob with exertion, which was witnessed on video today  She is afebrile, tolerating PO  No longer with diarrhea, did use immodium and now is having soft bowel movements several x per day  She is using vit c, vit d, zinc, and aspirin  As noted, bs have been high, endo aware  Will continue to monitor and f/u with them as planned  Continue to monitor and will f/u on Wednesday

## 2021-01-04 NOTE — PROGRESS NOTES
COVID-19 Virtual Visit     Assessment/Plan:    Problem List Items Addressed This Visit        Endocrine    Type 1 diabetes mellitus with hyperglycemia ([facility] [address] ) - Primary     Pt states sugars have been very high since she developed covid 19, at times 600  Endo aware, sent her to the ER where she was treated with fluids  She says that sugars have finally started to come down a bit  She should continue with insulin and f/u with endo as scheduled  Lab Results   Component Value Date    HGBA1C 6 8 (H) 09/14/2020               Other    COVID-19     Pt tested on 12/22 in preparation for surgery and came back positive  Sx started on 12/24  She states that condition showed improvement through the weekend  She continues with a slight cough and chest/nasal congestion  She becomes sob with exertion, which was witnessed on video today  She is afebrile, tolerating PO  No longer with diarrhea, did use immodium and now is having soft bowel movements several x per day  She is using vit c, vit d, zinc, and aspirin  As noted, bs have been high, endo aware  Will continue to monitor and f/u with them as planned  Continue to monitor and will f/u on Wednesday  Disposition:     I recommended continued isolation until at least 24 hours have passed since recovery defined as resolution of fever without the use of fever-reducing medications and improvement in respiratory symptoms (e g , cough, shortness of breath) AND 10 days have passed since onset of symptoms  I have spent 15 minutes directly with the patient  Greater than 50% of this time was spent in counseling/coordination of care regarding: prognosis, risks and benefits of treatment options, instructions for management, patient and family education, importance of treatment compliance, risk factor reductions and impressions          Encounter provider MARGARET Vidal    Provider located at [address] [place] [address] [facility]"" [place] [address]    Recent Visits  Date Type Provider Dept   12/31/20 Telemedicine Shilpa Beasley MD [address] Internal Med   12/30/20 Telephone [address] Internal Med   Showing recent visits within past 7 days and meeting all other requirements     Today's Visits  Date Type Provider Dept   01/04/21 Telemedicine Yane Harmon, [address] Internal Med   Showing today's visits and meeting all other requirements     Future Appointments  No visits were found meeting these conditions  Showing future appointments within next 150 days and meeting all other requirements      This virtual check-in was done via [employer] and patient was informed that this is a secure, HIPAA-compliant platform  She agrees to proceed  Patient agrees to participate in a virtual check in via telephone or video visit instead of presenting to the office to address urgent/immediate medical needs  Patient is aware this is a billable service  After connecting through [facility], the patient was identified by name and date of birth  Katarzyna Conn was informed that this was a telemedicine visit and that the exam was being conducted confidentially over secure lines  My office door was closed  No one else was in the room  Katarzyna Conn acknowledged consent and understanding of privacy and security of the telemedicine visit  I informed the patient that I have reviewed her record in Epic and presented the opportunity for her to ask any questions regarding the visit today  The patient agreed to participate  Subjective:   Katarzyna Conn is a 23 y o  female who has been screened for COVID-19  Symptom change since last report: improving  Date of symptom onset: 12/24/2021    Patient's symptoms include fatigue, nasal congestion, cough and shortness of breath   Patient denies fever, chills, malaise, rhinorrhea, sore throat, anosmia, loss of taste, chest tightness, abdominal pain, nausea, vomiting, diarrhea, myalgias and headaches  Joaquin Hoskins has been staying home and has isolated themselves in her home  She is taking care to not share personal items and is cleaning all surfaces that are touched often, like counters, tabletops, and doorknobs using household cleaning sprays or wipes  Wearing a mask when leaving room?: is not      Lab Results   Component Value Date    SARSCOV2 Negative 08/05/2020    SARSCOV2 Not Detected 06/04/2020    [facility] Not Detected 11/13/2020     Past Medical History:   Diagnosis Date    Abdominal pain     Anxiety     Anxiety and depression     Depression     Diabetes ([facility] [address] )     type 1    Diabetes mellitus ([facility] ) 1/17/2019    Eating disorder     history of anorexia/bulemia [id number]    Fracture of fibula     R Salter I    Hashimoto's disease 2/21/2020    Head injury     Nasal congestion     Obsessive-compulsive disorder     PTSD (post-traumatic stress disorder)     Rectal bleed     Seizures (HCC)      Past Surgical History:   Procedure Laterality Date    KNEE SURGERY Right 07/06/2020    NASAL SEPTOPLASTY W/ TURBINOPLASTY      SINUS SURGERY      WISDOM TOOTH EXTRACTION      WRIST SURGERY      left;  Excision of ganglion     Current Outpatient Medications   Medication Sig Dispense Refill    albuterol (PROVENTIL HFA,VENTOLIN HFA) 90 mcg/act inhaler Inhale 1 puff every 6 (six) hours as needed for wheezing or shortness of breath 1 Inhaler 5    Altavera 0 15-30 MG-MCG per tablet TAKE ONE TABLET BY MOUTH EVERY DAY 84 tablet 3    amitriptyline (ELAVIL) 10 mg tablet Take 0 5 tablets (5 mg total) by mouth daily at bedtime 30 tablet 1    ARIPiprazole (ABILIFY) 2 mg tablet TAKE 1 TABLET BY MOUTH DAILY 30 tablet 2    benzonatate (TESSALON PERLES) 100 mg capsule Take 1 capsule (100 mg total) by mouth 3 (three) times a day as needed for cough 30 capsule 1    Blood Glucose Monitoring Suppl (ONETOUCH VERIO) w/Device KIT Use as directed  0    cholecalciferol (VITAMIN D3) 1,000 units tablet Take 5,000 Units by mouth daily       clonazePAM (KlonoPIN) 0 5 mg tablet Take 1 tablet (0 5 mg total) by mouth 2 (two) times a day as needed for anxiety 60 tablet 2    clotrimazole-betamethasone (LOTRISONE) 1-0 05 % cream Apply topically as needed       desvenlafaxine succinate (PRISTIQ) 50 mg 24 hr tablet TAKE 1 TABLET BY MOUTH DAILY 30 tablet 2    divalproex sodium (DEPAKOTE ER) 250 mg 24 hr tablet Take 1 tablet (250 mg total) by mouth daily at bedtime Total dose 750 at bedtime 30 tablet 2    divalproex sodium (DEPAKOTE ER) 500 mg 24 hr tablet Take 1 tablet (500 mg total) by mouth daily 30 tablet 2    EPINEPHrine (EPIPEN) 0 3 mg/0 3 mL SOAJ Inject 0 3 mL (0 3 mg total) into a muscle once for 1 dose 0 6 mL 0    glucagon (GLUCAGON EMERGENCY) 1 MG injection Inject 1 mg under the skin once as needed for low blood sugar for up to 2 doses 1 kit 1    insulin aspart (NovoLOG) 100 units/mL injection Use 30 units per day via pump Disp 1 vial per month (Patient taking differently: Use 100units per day via pump Disp 2 vial per month) 30 mL 1    Insulin Infusion Pump KIT 13 mm cannula, 23 inch tubing change site every 3 days      Insulin Pen Needle (BD PEN NEEDLE NASIM U/F) 32G X 4 MM MISC by Does not apply route 4 (four) times a day for 180 days 400 each 3    methocarbamol (ROBAXIN) 500 mg tablet Take 500 mg by mouth daily as needed for muscle spasms      methocarbamol (ROBAXIN) 500 mg tablet Take 500 mg by mouth 4 (four) times a day      Multiple Vitamin (DAILY VALUE MULTIVITAMIN) TABS Take by mouth daily       ondansetron (ZOFRAN-ODT) 4 mg disintegrating tablet Take 4 mg by mouth every 8 (eight) hours as needed      ONETOUCH DELICA LANCETS 30L MISC Patient test 6 times daily 600 each 1    ONETOUCH VERIO test strip Test six times a day 600 each 3     No current facility-administered medications for this visit        Allergies   Allergen Reactions    Insulin Glargine Burning and redness under skin    Iodides Throat Swelling and Swelling     Reaction Date: 28Jul2014; Action Taken: none; Category: Allergy; Reaction Date: 28Jul2014; Action Taken: none; Category: Allergy;     Iodine        Review of Systems   Constitutional: Positive for fatigue  Negative for chills and fever  HENT: Positive for congestion  Negative for rhinorrhea and sore throat  Respiratory: Positive for cough and shortness of breath  Negative for chest tightness  Gastrointestinal: Negative for abdominal pain, diarrhea, nausea and vomiting  Musculoskeletal: Negative for myalgias  Neurological: Negative for headaches  Objective: There were no vitals filed for this visit  Physical Exam  Constitutional:       General: She is awake  She is not in acute distress  Appearance: Normal appearance  She is well-developed and well-groomed  She is not ill-appearing, toxic-appearing or diaphoretic  Eyes:      General: Lids are normal       Conjunctiva/sclera: Conjunctivae normal    Pulmonary:      Effort: Pulmonary effort is normal  No tachypnea, accessory muscle usage or respiratory distress  Comments: Brief episode of sob after a period of talking, resolved when stopped talking  Neurological:      Mental Status: She is alert and oriented to person, place, and time  Psychiatric:         Attention and Perception: Attention normal          Mood and Affect: Mood normal          Speech: Speech normal          Behavior: Behavior normal  Behavior is cooperative  Thought Content: Thought content normal          Cognition and Memory: Cognition normal          Judgment: Judgment normal        VIRTUAL VISIT DISCLAIMER    Katarzyna Conn acknowledges that she has consented to an online visit or consultation   She understands that the online visit is based solely on information provided by her, and that, in the absence of a face-to-face physical evaluation by the physician, the diagnosis she receives is both limited and provisional in terms of accuracy and completeness  This is not intended to replace a full medical face-to-face evaluation by the physician  Jaxson Nation understands and accepts these terms

## 2021-01-05 ENCOUNTER — TELEMEDICINE (OUTPATIENT)
Dept: PSYCHIATRY | Facility: CLINIC | Age: 26
End: 2021-01-05
Payer: COMMERCIAL

## 2021-01-05 DIAGNOSIS — F30.8 HYPOMANIA (HCC): ICD-10-CM

## 2021-01-05 DIAGNOSIS — F41.0 GENERALIZED ANXIETY DISORDER WITH PANIC ATTACKS: ICD-10-CM

## 2021-01-05 DIAGNOSIS — F41.1 GENERALIZED ANXIETY DISORDER WITH PANIC ATTACKS: ICD-10-CM

## 2021-01-05 PROCEDURE — 99213 OFFICE O/P EST LOW 20 MIN: CPT | Performed by: PSYCHIATRY & NEUROLOGY

## 2021-01-05 RX ORDER — DIVALPROEX SODIUM 500 MG/1
500 TABLET, EXTENDED RELEASE ORAL DAILY
Qty: 30 TABLET | Refills: 2 | Status: SHIPPED | OUTPATIENT
Start: 2021-01-05 | End: 2021-03-18

## 2021-01-05 RX ORDER — CLONAZEPAM 0.5 MG/1
0.5 TABLET ORAL DAILY
Qty: 30 TABLET | Refills: 2
Start: 2021-01-05 | End: 2021-01-28

## 2021-01-05 NOTE — PSYCH
Virtual Regular Visit      Assessment/Plan:    Problem List Items Addressed This Visit        Other    Generalized anxiety disorder with panic attacks    Relevant Medications    clonazePAM (KlonoPIN) 0 5 mg tablet      Other Visit Diagnoses     Hypomania ([address] )        Relevant Medications    divalproex sodium (DEPAKOTE ER) 500 mg 24 hr tablet               Reason for visit is   Chief Complaint   Patient presents with    Virtual Regular Visit        Encounter provider Aimee Hayes MD    Provider located at [facility] \[facility] [place] [facility]"" [id number]      Recent Visits  Date Type Provider Dept   01/04/21 200 [facility], [address] Internal Med   Showing recent visits within past 7 days and meeting all other requirements     Today's Visits  Date Type Provider Dept   01/05/21 Telemedicine Aimee Hayes MD [facility] 18 today's visits and meeting all other requirements     Future Appointments  No visits were found meeting these conditions  Showing future appointments within next 150 days and meeting all other requirements        The patient was identified by name and date of birth  Santo Stout was informed that this is a telemedicine visit and that the visit is being conducted through [employer] and patient was informed that this is a secure, HIPAA-compliant platform  She agrees to proceed     My office door was closed  No one else was in the room  She acknowledged consent and understanding of privacy and security of the video platform  The patient has agreed to participate and understands they can discontinue the visit at any time  Patient is aware this is a billable service  Subjective  Santo Stout is a 23 y o  female with Bipolar 2, FRANCIS, PTSD,OCD  Ayush Peterson stated that dose increase on Depakote has helped stabilize her mood   She feels her thoughts are clear now and she is less impulsive  She returned the car she had impulsively bought online  She is compliant with her medications and denies medication side effects  She was not able to get VA level in her blood due to testing positive for COVID 19 right before Christmas  She will be in quarantine until this Friday but agrees to lala blood level drawn next week  Will continue current treatment and schedule follow up in 4 weeks  HPI     Past Medical History:   Diagnosis Date    Abdominal pain     Anxiety     Anxiety and depression     Depression     Diabetes ([facility] [address] )     type 1    Diabetes mellitus ([facility] ) 1/17/2019    Eating disorder     history of anorexia/bulemia [id number]    Fracture of fibula     R Salter I    Hashimoto's disease 2/21/2020    Head injury     Nasal congestion     Obsessive-compulsive disorder     PTSD (post-traumatic stress disorder)     Rectal bleed     Seizures (HCC)        Past Surgical History:   Procedure Laterality Date    KNEE SURGERY Right 07/06/2020    NASAL SEPTOPLASTY W/ TURBINOPLASTY      SINUS SURGERY      WISDOM TOOTH EXTRACTION      WRIST SURGERY      left;  Excision of ganglion       Current Outpatient Medications   Medication Sig Dispense Refill    albuterol (PROVENTIL HFA,VENTOLIN HFA) 90 mcg/act inhaler Inhale 1 puff every 6 (six) hours as needed for wheezing or shortness of breath 1 Inhaler 5    Altavera 0 15-30 MG-MCG per tablet TAKE ONE TABLET BY MOUTH EVERY DAY 84 tablet 3    amitriptyline (ELAVIL) 10 mg tablet Take 0 5 tablets (5 mg total) by mouth daily at bedtime 30 tablet 1    ARIPiprazole (ABILIFY) 2 mg tablet TAKE 1 TABLET BY MOUTH DAILY 30 tablet 2    benzonatate (TESSALON PERLES) 100 mg capsule Take 1 capsule (100 mg total) by mouth 3 (three) times a day as needed for cough 30 capsule 1    Blood Glucose Monitoring Suppl (ONETOUCH VERIO) w/Device KIT Use as directed  0    cholecalciferol (VITAMIN D3) 1,000 units tablet Take 5,000 Units by mouth daily  clonazePAM (KlonoPIN) 0 5 mg tablet Take 1 tablet (0 5 mg total) by mouth daily 30 tablet 2    clotrimazole-betamethasone (LOTRISONE) 1-0 05 % cream Apply topically as needed       desvenlafaxine succinate (PRISTIQ) 50 mg 24 hr tablet TAKE 1 TABLET BY MOUTH DAILY 30 tablet 2    divalproex sodium (DEPAKOTE ER) 250 mg 24 hr tablet Take 1 tablet (250 mg total) by mouth daily at bedtime Total dose 750 at bedtime 30 tablet 2    divalproex sodium (DEPAKOTE ER) 500 mg 24 hr tablet Take 1 tablet (500 mg total) by mouth daily 30 tablet 2    EPINEPHrine (EPIPEN) 0 3 mg/0 3 mL SOAJ Inject 0 3 mL (0 3 mg total) into a muscle once for 1 dose 0 6 mL 0    glucagon (GLUCAGON EMERGENCY) 1 MG injection Inject 1 mg under the skin once as needed for low blood sugar for up to 2 doses 1 kit 1    insulin aspart (NovoLOG) 100 units/mL injection Use 30 units per day via pump Disp 1 vial per month (Patient taking differently: Use 100units per day via pump Disp 2 vial per month) 30 mL 1    Insulin Infusion Pump KIT 13 mm cannula, 23 inch tubing change site every 3 days      Insulin Pen Needle (BD PEN NEEDLE NASIM U/F) 32G X 4 MM MISC by Does not apply route 4 (four) times a day for 180 days 400 each 3    methocarbamol (ROBAXIN) 500 mg tablet Take 500 mg by mouth daily as needed for muscle spasms      methocarbamol (ROBAXIN) 500 mg tablet Take 500 mg by mouth 4 (four) times a day      Multiple Vitamin (DAILY VALUE MULTIVITAMIN) TABS Take by mouth daily       ondansetron (ZOFRAN-ODT) 4 mg disintegrating tablet Take 4 mg by mouth every 8 (eight) hours as needed      ONETOUCH DELICA LANCETS 97P MISC Patient test 6 times daily 600 each 1    ONETOUCH VERIO test strip Test six times a day 600 each 3     No current facility-administered medications for this visit           Allergies   Allergen Reactions    Insulin Glargine      Burning and redness under skin    Iodides Throat Swelling and Swelling     Reaction Date: 28Jul2014; Action Taken: none; Category: Allergy; Reaction Date: 28Jul2014; Action Taken: none; Category: Allergy;     Iodine        Review of Systems     Mood Anxiety, Depression and Emotional Lability   Behavior Compulsive Behavior and Impulsive Behavior   Thought Content Disturbing Thoughts, Feelings and Unreasonalbe or Irrational Fears   General Emotional Problems and Decreased Functioning   Personality Normal   Other Psych Symptoms Normal   Constitutional Negative   ENT Negative   Cardiovascular Negative   Respiratory COVID 19   Gastrointestinal Negative   Genitourinary Negative   Musculoskeletal Negative   Integumentary Negative   Neurological Negative   Endocrine Normal    Other Symptoms Normal              Laboratory Results: No results found for this or any previous visit      Substance Abuse History:  Social History     Substance and Sexual Activity   Drug Use No       Family Psychiatric History:   Family History   Problem Relation Age of Onset    Hypertension Mother    [facility] Migraines Mother         Headache    Diabetes type II Mother     Varicose Veins Mother     Hyperlipidemia Mother     Diabetes Mother     Arthritis Mother     Depression Mother     Cholelithiasis Father     Hypertension Father     Sarcoidosis Father         Liver    Hyperlipidemia Father     Diabetes Father     Coronary artery disease Father     Nephrolithiasis Father     Cirrhosis Father     Alcohol abuse Father     Thyroid disease Sister     Cancer Family     Diabetes Family     Hypertension Family     Alcohol abuse Brother        The following portions of the patient's history were reviewed and updated as appropriate: allergies, current medications, past family history, past medical history, past social history, past surgical history and problem list     Social History     Socioeconomic History    Marital status: Single     Spouse name: Not on file    Number of children: 0    Years of education: Not on file    Highest education level: Associate degree: academic program   Occupational History    Occupation: unemployed   Social Needs    Financial resource strain: Somewhat hard    Food insecurity     Worry: Not on file     Inability: Not on file    Transportation needs     Medical: Not on file     Non-medical: Not on file   Tobacco Use    Smoking status: Never Smoker    Smokeless tobacco: Never Used    Tobacco comment: Tobacco smoke exposure (Father smokes cigars)   Substance and Sexual Activity    Alcohol use: Not Currently     Frequency: Monthly or less     Drinks per session: 1 or 2     Binge frequency: Never     Comment: socially      Drug use: No    Sexual activity: Not Currently     Partners: Male     Birth control/protection: Condom Male, OCP   Lifestyle    Physical activity     Days per week: 7 days     Minutes per session: 10 min    Stress: Rather much   Relationships    Social connections     Talks on phone: More than three times a week     Gets together: Not on file     Attends Baptism service: Not on file     Active member of club or organization: No     Attends meetings of clubs or organizations: Never     Relationship status: Never     Intimate partner violence     Fear of current or ex partner: No     Emotionally abused: No     Physically abused: No     Forced sexual activity: No   Other Topics Concern    Not on file   Social History Narrative    Student at [employer] a poor diet; low in vegetables, high in sweets    Dental care, regularly    Lives with parents    Sleeps 8-10 hours a day     Social History     Social History Narrative    Student at [employer] a poor diet; low in vegetables, high in sweets    Dental care, regularly    Lives with parents    Sleeps 8-10 hours a day       Objective:       Mental status:  Appearance calm and cooperative , adequate hygiene and grooming and good eye contact    Mood dysphoric   Affect affect was constricted   Speech a normal rate and fluent Thought Processes coherent/organized and normal thought processes   Hallucinations no hallucinations present    Thought Content no delusions   Abnormal Thoughts no suicidal thoughts  and no homicidal thoughts    Orientation  oriented to person and place and time   Remote Memory short term memory intact and long term memory intact   Attention Span concentration impaired   Intellect Appears to be of Average Intelligence   Insight Limited insight   Judgement judgment was limited   Muscle Strength n/a   Language no difficulty naming common objects, no difficulty repeating a phrase  and no difficulty writing a sentence    Fund of Knowledge displays adequate knowledge of current events, adequate fund of knowledge regarding past history and adequate fund of knowledge regarding vocabulary                Assessment/Plan:       Diagnoses and all orders for this visit:    Hypomania ([address] )  -     divalproex sodium (DEPAKOTE ER) 500 mg 24 hr tablet; Take 1 tablet (500 mg total) by mouth daily    Generalized anxiety disorder with panic attacks  -     clonazePAM (KlonoPIN) 0 5 mg tablet; Take 1 tablet (0 5 mg total) by mouth daily            Treatment Recommendations- Risks Benefits      Immediate Medical/Psychiatric/Psychotherapy Treatments and Any Precautions: continue current treatment     Risks, Benefits And Possible Side Effects Of Medications:  {PSYCH RISK, BENEFITS AND POSSIBLE SIDE EFFECTS (Optional):49085    Controlled Medication Discussion: Discussed with patient Black Box warning on concurrent use of benzodiazepines and opioid medications including sedation, respiratory depression, coma and death  Patient understands the risk of treatment with benzodiazepines in addition to opioids and wants to continue taking those medications   , Discussed with patient the risks of sedation, respiratory depression, impairment of ability to drive and potential for abuse and addiction related to treatment with benzodiazepine medications  The patient understands risk of treatment with benzodiazepine medications, agrees to not drive if feels impaired and agrees to take medications as prescribed  and The patient has been filling controlled prescriptions on time as prescribed to [facility]       Psychotherapy Provided:     Individual psychotherapy provided: No                      I spent 20 minutes directly with the patient during this visit      VIRTUAL VISIT DISCLAIMER    Asia Zepeda acknowledges that she has consented to an online visit or consultation  She understands that the online visit is based solely on information provided by her, and that, in the absence of a face-to-face physical evaluation by the physician, the diagnosis she receives is both limited and provisional in terms of accuracy and completeness  This is not intended to replace a full medical face-to-face evaluation by the physician  Asia Zepeda understands and accepts these terms

## 2021-01-06 ENCOUNTER — OFFICE VISIT (OUTPATIENT)
Dept: URGENT CARE | Age: 26
End: 2021-01-06
Payer: COMMERCIAL

## 2021-01-06 ENCOUNTER — APPOINTMENT (OUTPATIENT)
Dept: RADIOLOGY | Age: 26
End: 2021-01-06
Payer: COMMERCIAL

## 2021-01-06 ENCOUNTER — TELEMEDICINE (OUTPATIENT)
Dept: INTERNAL MEDICINE CLINIC | Facility: CLINIC | Age: 26
End: 2021-01-06
Payer: COMMERCIAL

## 2021-01-06 VITALS — OXYGEN SATURATION: 100 % | TEMPERATURE: 97.4 F | HEART RATE: 102 BPM | RESPIRATION RATE: 18 BRPM

## 2021-01-06 DIAGNOSIS — U07.1 COVID-19: ICD-10-CM

## 2021-01-06 DIAGNOSIS — U07.1 COVID-19: Primary | ICD-10-CM

## 2021-01-06 PROCEDURE — G0382 LEV 3 HOSP TYPE B ED VISIT: HCPCS | Performed by: PHYSICIAN ASSISTANT

## 2021-01-06 PROCEDURE — 71046 X-RAY EXAM CHEST 2 VIEWS: CPT

## 2021-01-06 PROCEDURE — 99283 EMERGENCY DEPT VISIT LOW MDM: CPT | Performed by: PHYSICIAN ASSISTANT

## 2021-01-06 PROCEDURE — 99213 OFFICE O/P EST LOW 20 MIN: CPT | Performed by: NURSE PRACTITIONER

## 2021-01-06 PROCEDURE — 99203 OFFICE O/P NEW LOW 30 MIN: CPT | Performed by: PHYSICIAN ASSISTANT

## 2021-01-06 NOTE — PROGRESS NOTES
COVID-19 Virtual Visit     Assessment/Plan:    Problem List Items Addressed This Visit        Other    [facility] - Primary     Pt tested positive on 12/22 and reports worsening sob/chest tightness  She does appear to have labored breathing on video exam though she is able to maintain conversation  She does not have a thermometer, states she felt very hot yesterday  Tolerating PO though she is becoming nauseated with food  Several soft bm daily, no diarrhea  Sugars are improving  Will have pt evaluated at respiratory clinic today  Instructions provided  Will f/u tomorrow  Disposition:     I referred patient to one of our [place] [address] Respiratory Clinics  I recommended continued isolation until at least 24 hours have passed since recovery defined as resolution of fever without the use of fever-reducing medications and improvement in respiratory symptoms (e g , cough, shortness of breath) AND 10 days have passed since onset of symptoms  I have spent 20 minutes directly with the patient  Greater than 50% of this time was spent in counseling/coordination of care regarding: prognosis, risks and benefits of treatment options, instructions for management, patient and family education, importance of treatment compliance, risk factor reductions and impressions          Encounter provider [place], [place]    Provider located at [address] [address]    Recent Visits  Date Type Provider Dept   01/04/21 [address] [facility], [address] Internal Med   12/31/20 Telemedicine Lety Henderson MD [facility] Internal Med   12/30/20 Telephone [address] [facility] Internal Med   Showing recent visits within past 7 days and meeting all other requirements     Today's Visits  Date Type Provider Dept   01/06/21 Telemedicine Yane Le, [address] Internal Med   Showing today's visits and meeting all other requirements     Future Appointments  No visits were found meeting these conditions  Showing future appointments within next 150 days and meeting all other requirements      This virtual check-in was done via [employer] and patient was informed that this is not a secure, HIPAA-compliant platform  She agrees to proceed  Patient agrees to participate in a virtual check in via telephone or video visit instead of presenting to the office to address urgent/immediate medical needs  Patient is aware this is a billable service  After connecting through [facility], the patient was identified by name and date of birth  Jaxson Nation was informed that this was a telemedicine visit and that the exam was being conducted confidentially over secure lines  My office door was closed  No one else was in the room  Jaxson Nation acknowledged consent and understanding of privacy and security of the telemedicine visit  I informed the patient that I have reviewed her record in Epic and presented the opportunity for her to ask any questions regarding the visit today  The patient agreed to participate  Subjective:   Jaxson Nation is a 23 y o  female who has been screened for COVID-19  Symptom change since last report: worsening  Patient's symptoms include fatigue, nasal congestion, cough, shortness of breath, chest tightness and nausea  Patient denies fever, chills, malaise, rhinorrhea, sore throat, anosmia, loss of taste, abdominal pain, vomiting, diarrhea, myalgias and headaches  Shyam Nicole has been staying home and has isolated themselves in her home  She is taking care to not share personal items and is cleaning all surfaces that are touched often, like counters, tabletops, and doorknobs using household cleaning sprays or wipes       Wearing a mask when leaving room?: is not      Lab Results   Component Value Date    SARSCOV2 Negative 08/05/2020    SARSCOV2 Not Detected 06/04/2020    SARSCORONAVI Not Detected 11/13/2020     Past Medical History:   Diagnosis Date    Abdominal pain     Anxiety     Anxiety and depression     Depression     Diabetes ([facility] [address] )     type 1    Diabetes mellitus ([facility] [address] ) 1/17/2019    Eating disorder     history of anorexia/bulemia [id number]    Fracture of fibula     R Salter I    Hashimoto's disease 2/21/2020    Head injury     Nasal congestion     Obsessive-compulsive disorder     PTSD (post-traumatic stress disorder)     Rectal bleed     Seizures (HCC)      Past Surgical History:   Procedure Laterality Date    KNEE SURGERY Right 07/06/2020    NASAL SEPTOPLASTY W/ TURBINOPLASTY      SINUS SURGERY      WISDOM TOOTH EXTRACTION      WRIST SURGERY      left;  Excision of ganglion     Current Outpatient Medications   Medication Sig Dispense Refill    albuterol (PROVENTIL HFA,VENTOLIN HFA) 90 mcg/act inhaler Inhale 1 puff every 6 (six) hours as needed for wheezing or shortness of breath 1 Inhaler 5    Altavera 0 15-30 MG-MCG per tablet TAKE ONE TABLET BY MOUTH EVERY DAY 84 tablet 3    amitriptyline (ELAVIL) 10 mg tablet Take 0 5 tablets (5 mg total) by mouth daily at bedtime 30 tablet 1    ARIPiprazole (ABILIFY) 2 mg tablet TAKE 1 TABLET BY MOUTH DAILY 30 tablet 2    benzonatate (TESSALON PERLES) 100 mg capsule Take 1 capsule (100 mg total) by mouth 3 (three) times a day as needed for cough 30 capsule 1    Blood Glucose Monitoring Suppl (ONETOUCH VERIO) w/Device KIT Use as directed  0    cholecalciferol (VITAMIN D3) 1,000 units tablet Take 5,000 Units by mouth daily       clonazePAM (KlonoPIN) 0 5 mg tablet Take 1 tablet (0 5 mg total) by mouth daily 30 tablet 2    desvenlafaxine succinate (PRISTIQ) 50 mg 24 hr tablet TAKE 1 TABLET BY MOUTH DAILY 30 tablet 2    divalproex sodium (DEPAKOTE ER) 250 mg 24 hr tablet Take 1 tablet (250 mg total) by mouth daily at bedtime Total dose 750 at bedtime 30 tablet 2    divalproex sodium (DEPAKOTE ER) 500 mg 24 hr tablet Take 1 tablet (500 mg total) by mouth daily 30 tablet 2    glucagon (GLUCAGON EMERGENCY) 1 MG injection Inject 1 mg under the skin once as needed for low blood sugar for up to 2 doses 1 kit 1    insulin aspart (NovoLOG) 100 units/mL injection Use 30 units per day via pump Disp 1 vial per month (Patient taking differently: Use 100units per day via pump Disp 2 vial per month) 30 mL 1    Insulin Infusion Pump KIT 13 mm cannula, 23 inch tubing change site every 3 days      methocarbamol (ROBAXIN) 500 mg tablet Take 500 mg by mouth daily as needed for muscle spasms      methocarbamol (ROBAXIN) 500 mg tablet Take 500 mg by mouth 4 (four) times a day      Multiple Vitamin (DAILY VALUE MULTIVITAMIN) TABS Take by mouth daily       ondansetron (ZOFRAN-ODT) 4 mg disintegrating tablet Take 4 mg by mouth every 8 (eight) hours as needed      ONETOUCH DELICA LANCETS 65Z MISC Patient test 6 times daily 600 each 1    ONETOUCH VERIO test strip Test six times a day 600 each 3    clotrimazole-betamethasone (LOTRISONE) 1-0 05 % cream Apply topically as needed       EPINEPHrine (EPIPEN) 0 3 mg/0 3 mL SOAJ Inject 0 3 mL (0 3 mg total) into a muscle once for 1 dose 0 6 mL 0    Insulin Pen Needle (BD PEN NEEDLE NASIM U/F) 32G X 4 MM MISC by Does not apply route 4 (four) times a day for 180 days 400 each 3     No current facility-administered medications for this visit  Allergies   Allergen Reactions    Insulin Glargine      Burning and redness under skin    Iodides Throat Swelling and Swelling     Reaction Date: 28Jul2014; Action Taken: none; Category: Allergy; Reaction Date: 28Jul2014; Action Taken: none; Category: Allergy;     Iodine        Review of Systems   Constitutional: Positive for fatigue  Negative for chills and fever  HENT: Positive for congestion  Negative for rhinorrhea and sore throat  Respiratory: Positive for cough, chest tightness and shortness of breath  Gastrointestinal: Positive for nausea   Negative for abdominal pain, diarrhea and vomiting  Musculoskeletal: Negative for myalgias  Neurological: Negative for headaches  Objective: There were no vitals filed for this visit  Physical Exam  Constitutional:       General: She is awake  She is not in acute distress  Appearance: Normal appearance  She is well-developed  She is not ill-appearing or toxic-appearing  Eyes:      General: Lids are normal       Conjunctiva/sclera: Conjunctivae normal    Pulmonary:      Effort: Pulmonary effort is normal  Tachypnea present  No accessory muscle usage or respiratory distress  Neurological:      Mental Status: She is alert and oriented to person, place, and time  Psychiatric:         Attention and Perception: Attention normal          Mood and Affect: Mood normal          Speech: Speech normal          Behavior: Behavior normal  Behavior is cooperative  Thought Content: Thought content normal          Cognition and Memory: Cognition normal          Judgment: Judgment normal        VIRTUAL VISIT DISCLAIMER    Yasmanyraul Fischer acknowledges that she has consented to an online visit or consultation  She understands that the online visit is based solely on information provided by her, and that, in the absence of a face-to-face physical evaluation by the physician, the diagnosis she receives is both limited and provisional in terms of accuracy and completeness  This is not intended to replace a full medical face-to-face evaluation by the physician  Naldo Fischer understands and accepts these terms

## 2021-01-06 NOTE — ASSESSMENT & PLAN NOTE
Pt tested positive on 12/22 and reports worsening sob/chest tightness  She does appear to have labored breathing on video exam though she is able to maintain conversation  She does not have a thermometer, states she felt very hot yesterday  Tolerating PO though she is becoming nauseated with food  Several soft bm daily, no diarrhea  Sugars are improving  Will have pt evaluated at respiratory clinic today  Instructions provided  Will f/u tomorrow

## 2021-01-06 NOTE — PROGRESS NOTES
[facility]        NAME: Irlanda Patel is a 23 y o  female  : 1995    MRN: [id number]  DATE: 2021  TIME: 1:18 PM    Assessment and Plan   COVID-19 [U07 1]  1  COVID-19  XR chest pa & lateral         Patient Instructions     Patient's vital signs are stable and heart and lung exam normal   -chest x-ray pending final read  -start using inhaler as directed  Follow up with PCP in 1-2 days  Proceed to  ER if symptoms worsen  Chief Complaint     Chief Complaint   Patient presents with    [id number]      positive, sent by PCP for respiratory clinic    Shortness of Breath     continues with     Cough     continues with cough         History of Present Illness       Patient was sent here by her PCP for evaluation  Patient was diagnosed with COVID on   She states she is still having a cough and some shortness of breath  She is no longer having fevers  She does have an inhaler at home but has not been using it  Her PCPs told her to start using her inhaler after her appointment today  Review of Systems   Review of Systems   Constitutional: Negative  HENT: Negative  Respiratory: Positive for cough and shortness of breath  Negative for wheezing  Cardiovascular: Negative  Gastrointestinal: Negative  Musculoskeletal: Negative  Neurological: Negative  Psychiatric/Behavioral: Negative            Current Medications       Current Outpatient Medications:     albuterol (PROVENTIL HFA,VENTOLIN HFA) 90 mcg/act inhaler, Inhale 1 puff every 6 (six) hours as needed for wheezing or shortness of breath, Disp: 1 Inhaler, Rfl: 5    Altavera 0 15-30 MG-MCG per tablet, TAKE ONE TABLET BY MOUTH EVERY DAY, Disp: 84 tablet, Rfl: 3    amitriptyline (ELAVIL) 10 mg tablet, Take 0 5 tablets (5 mg total) by mouth daily at bedtime, Disp: 30 tablet, Rfl: 1    ARIPiprazole (ABILIFY) 2 mg tablet, TAKE 1 TABLET BY MOUTH DAILY, Disp: 30 tablet, Rfl: 2    benzonatate (TESSALON PERLES) 100 mg capsule, Take 1 capsule (100 mg total) by mouth 3 (three) times a day as needed for cough (Patient not taking: Reported on 1/6/2021), Disp: 30 capsule, Rfl: 1    Blood Glucose Monitoring Suppl (Adeola Gore) w/Device KIT, Use as directed, Disp: , Rfl: 0    cholecalciferol (VITAMIN D3) 1,000 units tablet, Take 5,000 Units by mouth daily , Disp: , Rfl:     clonazePAM (KlonoPIN) 0 5 mg tablet, Take 1 tablet (0 5 mg total) by mouth daily, Disp: 30 tablet, Rfl: 2    clotrimazole-betamethasone (LOTRISONE) 1-0 05 % cream, Apply topically as needed , Disp: , Rfl:     desvenlafaxine succinate (PRISTIQ) 50 mg 24 hr tablet, TAKE 1 TABLET BY MOUTH DAILY, Disp: 30 tablet, Rfl: 2    divalproex sodium (DEPAKOTE ER) 250 mg 24 hr tablet, Take 1 tablet (250 mg total) by mouth daily at bedtime Total dose 750 at bedtime, Disp: 30 tablet, Rfl: 2    divalproex sodium (DEPAKOTE ER) 500 mg 24 hr tablet, Take 1 tablet (500 mg total) by mouth daily, Disp: 30 tablet, Rfl: 2    EPINEPHrine (EPIPEN) 0 3 mg/0 3 mL SOAJ, Inject 0 3 mL (0 3 mg total) into a muscle once for 1 dose, Disp: 0 6 mL, Rfl: 0    glucagon (GLUCAGON EMERGENCY) 1 MG injection, Inject 1 mg under the skin once as needed for low blood sugar for up to 2 doses, Disp: 1 kit, Rfl: 1    insulin aspart (NovoLOG) 100 units/mL injection, Use 30 units per day via pump Disp 1 vial per month (Patient taking differently: Use 100units per day via pump Disp 2 vial per month), Disp: 30 mL, Rfl: 1    Insulin Infusion Pump KIT, 13 mm cannula, 23 inch tubing change site every 3 days, Disp: , Rfl:     Insulin Pen Needle (BD PEN NEEDLE NASIM U/F) 32G X 4 MM MISC, by Does not apply route 4 (four) times a day for 180 days, Disp: 400 each, Rfl: 3    methocarbamol (ROBAXIN) 500 mg tablet, Take 500 mg by mouth daily as needed for muscle spasms, Disp: , Rfl:     methocarbamol (ROBAXIN) 500 mg tablet, Take 500 mg by mouth 4 (four) times a day, Disp: , Rfl:     Multiple Vitamin (DAILY VALUE MULTIVITAMIN) TABS, Take by mouth daily , Disp: , Rfl:     ondansetron (ZOFRAN-ODT) 4 mg disintegrating tablet, Take 4 mg by mouth every 8 (eight) hours as needed, Disp: , Rfl:     ONETOUCH DELICA LANCETS 25H MISC, Patient test 6 times daily, Disp: 600 each, Rfl: 1    ONETOUCH VERIO test strip, Test six times a day, Disp: 600 each, Rfl: 3    Current Allergies     Allergies as of 01/06/2021 - Reviewed 01/06/2021   Allergen Reaction Noted    Insulin glargine  07/02/2019    Iodides Throat Swelling and Swelling 07/24/2014    Iodine  07/24/2014            The following portions of the patient's history were reviewed and updated as appropriate: allergies, current medications, past family history, past medical history, past social history, past surgical history and problem list      Past Medical History:   Diagnosis Date    Abdominal pain     Anxiety     Anxiety and depression     Depression     Diabetes ([facility] [address] )     type 1    Diabetes mellitus ([facility] [address] [facility] ) 1/17/2019    Eating disorder     history of anorexia/bulemia [id number]    Fracture of fibula     R Salter I    Hashimoto's disease 2/21/2020    Head injury     Nasal congestion     Obsessive-compulsive disorder     PTSD (post-traumatic stress disorder)     Rectal bleed     Seizures (HCC)        Past Surgical History:   Procedure Laterality Date    KNEE SURGERY Right 07/06/2020    NASAL SEPTOPLASTY W/ TURBINOPLASTY      SINUS SURGERY      WISDOM TOOTH EXTRACTION      WRIST SURGERY      left;  Excision of ganglion       Family History   Problem Relation Age of Onset    Hypertension Mother    Cathlene Salon Migraines Mother         Headache    Diabetes type II Mother     Varicose Veins Mother     Hyperlipidemia Mother     Diabetes Mother    Cathlene Salon Arthritis Mother     Depression Mother     Cholelithiasis Father     Hypertension Father     Sarcoidosis Father         Liver    Hyperlipidemia Father     Diabetes Father     Coronary artery disease Father     Nephrolithiasis Father     Cirrhosis Father     Alcohol abuse Father     Thyroid disease Sister     Cancer Family     Diabetes Family     Hypertension Family     Alcohol abuse Brother          Medications have been verified  Objective   LMP 08/20/2019        Physical Exam     Physical Exam  Vitals signs and nursing note reviewed  Constitutional:       General: She is not in acute distress  Appearance: Normal appearance  She is not ill-appearing or toxic-appearing  Cardiovascular:      Rate and Rhythm: Normal rate and regular rhythm  Pulses: Normal pulses  Heart sounds: Normal heart sounds  Pulmonary:      Effort: Pulmonary effort is normal       Breath sounds: Normal breath sounds  No wheezing  Skin:     General: Skin is warm and dry  Neurological:      General: No focal deficit present  Mental Status: She is alert and oriented to person, place, and time     Psychiatric:         Mood and Affect: Mood normal          Behavior: Behavior normal

## 2021-01-07 ENCOUNTER — TELEMEDICINE (OUTPATIENT)
Dept: INTERNAL MEDICINE CLINIC | Facility: CLINIC | Age: 26
End: 2021-01-07
Payer: COMMERCIAL

## 2021-01-07 DIAGNOSIS — U07.1 COVID-19: Primary | ICD-10-CM

## 2021-01-07 DIAGNOSIS — R05.9 COUGH: ICD-10-CM

## 2021-01-07 PROCEDURE — 99213 OFFICE O/P EST LOW 20 MIN: CPT | Performed by: NURSE PRACTITIONER

## 2021-01-07 RX ORDER — BENZONATATE 100 MG/1
100 CAPSULE ORAL 3 TIMES DAILY PRN
Qty: 30 CAPSULE | Refills: 1 | Status: SHIPPED | OUTPATIENT
Start: 2021-01-07 | End: 2021-02-03

## 2021-01-07 NOTE — PROGRESS NOTES
COVID-19 Virtual Visit     Assessment/Plan:    Problem List Items Addressed This Visit        Other    Cough    Relevant Medications    benzonatate (TESSALON PERLES) 100 mg capsule    COVID-19 - Primary     Pt tested positive on 12/22  She was sent to the resp clinic yesterday for evaluation due to increased sob  Vitals were stable, chest xray was negative for pna  She has started using her albuterol inhaler and states that does seem to help improve the symptoms somewhat  She continues with cough and fatigue, says she sleeps a lot  Tolerating PO, blood sugars are stable currently  Rx resent for benzonatate because it did not go through the first time it was prescribed  Continue with albuterol and vitamins, aspirin  Pt elected not to f/u tomorrow and will delay f/u until Monday  Pt should call if sx worsen  Disposition:     I recommended continued isolation until at least 24 hours have passed since recovery defined as resolution of fever without the use of fever-reducing medications AND improvement in COVID symptoms AND 10 days have passed since onset of symptoms (or 10 days have passed since date of first positive viral diagnostic test for asymptomatic patients)  I have spent 15 minutes directly with the patient  Greater than 50% of this time was spent in counseling/coordination of care regarding: prognosis, risks and benefits of treatment options, instructions for management, patient and family education, importance of treatment compliance, risk factor reductions and impressions          Encounter provider Bairon Lockwood [address] [place] [address]    Provider located at [address] [address]    Recent Visits  Date Type Provider Dept   01/06/21 200 [facility], [address] Internal Med   01/04/21 200 [facility], [address] Internal Med   12/31/20 Telemedicine Ryan Davies MD [facility] Internal Med   Showing recent visits within past 7 days and meeting all other requirements     Today's Visits  Date Type Provider Dept   01/07/21 Telemedicine Yane Ruffin [place], [address] Internal Med   Showing today's visits and meeting all other requirements     Future Appointments  No visits were found meeting these conditions  Showing future appointments within next 150 days and meeting all other requirements      This virtual check-in was done via [employer] and patient was informed that this is not a secure, HIPAA-compliant platform  She agrees to proceed  Patient agrees to participate in a virtual check in via telephone or video visit instead of presenting to the office to address urgent/immediate medical needs  Patient is aware this is a billable service  After connecting through [facility], the patient was identified by name and date of birth  May Gallego was informed that this was a telemedicine visit and that the exam was being conducted confidentially over secure lines  My office door was closed  No one else was in the room  May Gallego acknowledged consent and understanding of privacy and security of the telemedicine visit  I informed the patient that I have reviewed her record in Epic and presented the opportunity for her to ask any questions regarding the visit today  The patient agreed to participate  Subjective:   May Gallego is a 23 y o  female who has been screened for COVID-19  Symptom change since last report: unchanged  Patient is currently asymptomatic  Patient's symptoms include fatigue, cough, shortness of breath, chest tightness and nausea  Patient denies fever, chills, malaise, congestion, rhinorrhea, sore throat, anosmia, loss of taste, abdominal pain, vomiting, diarrhea, myalgias and headaches  Ulysses Rosette has been staying home and has isolated themselves in her home   She is taking care to not share personal items and is cleaning all surfaces that are touched often, like counters, tabletops, and doorknobs using household cleaning sprays or wipes  Wearing a mask when leaving room?: is not      Date of positive COVID-19 PCR: 12/22/2020    Lab Results   Component Value Date    SARSCOV2 Negative 08/05/2020    SARSCOV2 Not Detected 06/04/2020    [facility] [facility] [address] Not Detected 11/13/2020     Past Medical History:   Diagnosis Date    Abdominal pain     Anxiety     Anxiety and depression     Depression     Diabetes ([facility] )     type 1    Diabetes mellitus ([facility] ) 1/17/2019    Eating disorder     history of anorexia/bulemia [id number]    Fracture of fibula     R Salter I    Hashimoto's disease 2/21/2020    Head injury     Nasal congestion     Obsessive-compulsive disorder     PTSD (post-traumatic stress disorder)     Rectal bleed     Seizures (HCC)      Past Surgical History:   Procedure Laterality Date    KNEE SURGERY Right 07/06/2020    NASAL SEPTOPLASTY W/ TURBINOPLASTY      SINUS SURGERY      WISDOM TOOTH EXTRACTION      WRIST SURGERY      left;  Excision of ganglion     Current Outpatient Medications   Medication Sig Dispense Refill    albuterol (PROVENTIL HFA,VENTOLIN HFA) 90 mcg/act inhaler Inhale 1 puff every 6 (six) hours as needed for wheezing or shortness of breath 1 Inhaler 5    Altavera 0 15-30 MG-MCG per tablet TAKE ONE TABLET BY MOUTH EVERY DAY 84 tablet 3    amitriptyline (ELAVIL) 10 mg tablet Take 0 5 tablets (5 mg total) by mouth daily at bedtime 30 tablet 1    ARIPiprazole (ABILIFY) 2 mg tablet TAKE 1 TABLET BY MOUTH DAILY 30 tablet 2    benzonatate (TESSALON PERLES) 100 mg capsule Take 1 capsule (100 mg total) by mouth 3 (three) times a day as needed for cough 30 capsule 1    Blood Glucose Monitoring Suppl (ONETOUCH VERIO) w/Device KIT Use as directed  0    cholecalciferol (VITAMIN D3) 1,000 units tablet Take 5,000 Units by mouth daily       clonazePAM (KlonoPIN) 0 5 mg tablet Take 1 tablet (0 5 mg total) by mouth daily 30 tablet 2    clotrimazole-betamethasone (LOTRISONE) 1-0 05 % cream Apply topically as needed       desvenlafaxine succinate (PRISTIQ) 50 mg 24 hr tablet TAKE 1 TABLET BY MOUTH DAILY 30 tablet 2    divalproex sodium (DEPAKOTE ER) 250 mg 24 hr tablet Take 1 tablet (250 mg total) by mouth daily at bedtime Total dose 750 at bedtime 30 tablet 2    divalproex sodium (DEPAKOTE ER) 500 mg 24 hr tablet Take 1 tablet (500 mg total) by mouth daily 30 tablet 2    EPINEPHrine (EPIPEN) 0 3 mg/0 3 mL SOAJ Inject 0 3 mL (0 3 mg total) into a muscle once for 1 dose 0 6 mL 0    glucagon (GLUCAGON EMERGENCY) 1 MG injection Inject 1 mg under the skin once as needed for low blood sugar for up to 2 doses 1 kit 1    insulin aspart (NovoLOG) 100 units/mL injection Use 30 units per day via pump Disp 1 vial per month (Patient taking differently: Use 100units per day via pump Disp 2 vial per month) 30 mL 1    Insulin Infusion Pump KIT 13 mm cannula, 23 inch tubing change site every 3 days      Insulin Pen Needle (BD PEN NEEDLE NASIM U/F) 32G X 4 MM MISC by Does not apply route 4 (four) times a day for 180 days 400 each 3    methocarbamol (ROBAXIN) 500 mg tablet Take 500 mg by mouth daily as needed for muscle spasms      methocarbamol (ROBAXIN) 500 mg tablet Take 500 mg by mouth 4 (four) times a day      Multiple Vitamin (DAILY VALUE MULTIVITAMIN) TABS Take by mouth daily       ondansetron (ZOFRAN-ODT) 4 mg disintegrating tablet Take 4 mg by mouth every 8 (eight) hours as needed      ONETOUCH DELICA LANCETS 68N MISC Patient test 6 times daily 600 each 1    ONETOUCH VERIO test strip Test six times a day 600 each 3     No current facility-administered medications for this visit  Allergies   Allergen Reactions    Insulin Glargine      Burning and redness under skin    Iodides Throat Swelling and Swelling     Reaction Date: 28Jul2014; Action Taken: none; Category: Allergy; Reaction Date: 28Jul2014;  Action Taken: none; Category: Allergy;     Iodine        Review of Systems   Constitutional: Positive for fatigue  Negative for chills and fever  HENT: Negative for congestion, rhinorrhea and sore throat  Respiratory: Positive for cough, chest tightness and shortness of breath  Gastrointestinal: Positive for nausea  Negative for abdominal pain, diarrhea and vomiting  Musculoskeletal: Negative for myalgias  Neurological: Negative for headaches  Objective: There were no vitals filed for this visit  Physical Exam  Constitutional:       General: She is awake  She is not in acute distress  Appearance: Normal appearance  She is well-developed  Eyes:      General: Lids are normal       Conjunctiva/sclera: Conjunctivae normal    Pulmonary:      Effort: Pulmonary effort is normal  No tachypnea, accessory muscle usage or respiratory distress  Neurological:      Mental Status: She is alert and oriented to person, place, and time  Psychiatric:         Attention and Perception: Attention normal          Mood and Affect: Mood normal          Speech: Speech normal          Behavior: Behavior normal  Behavior is cooperative  Thought Content: Thought content normal          Cognition and Memory: Cognition normal          Judgment: Judgment normal        VIRTUAL VISIT DISCLAIMER    Gilmer Ruiz acknowledges that she has consented to an online visit or consultation  She understands that the online visit is based solely on information provided by her, and that, in the absence of a face-to-face physical evaluation by the physician, the diagnosis she receives is both limited and provisional in terms of accuracy and completeness  This is not intended to replace a full medical face-to-face evaluation by the physician  Gilmer Ruiz understands and accepts these terms

## 2021-01-07 NOTE — ASSESSMENT & PLAN NOTE
Pt tested positive on 12/22  She was sent to the resp clinic yesterday for evaluation due to increased sob  Vitals were stable, chest xray was negative for pna  She has started using her albuterol inhaler and states that does seem to help improve the symptoms somewhat  She continues with cough and fatigue, says she sleeps a lot  Tolerating PO, blood sugars are stable currently  Rx resent for benzonatate because it did not go through the first time it was prescribed  Continue with albuterol and vitamins, aspirin  Pt elected not to f/u tomorrow and will delay f/u until Monday  Pt should call if sx worsen

## 2021-01-08 ENCOUNTER — TELEPHONE (OUTPATIENT)
Dept: NEUROLOGY | Facility: CLINIC | Age: 26
End: 2021-01-08

## 2021-01-08 NOTE — TELEPHONE ENCOUNTER
Called patient to inform patient that the location has been changed to [place] gave patient the address to the [place] location

## 2021-01-11 ENCOUNTER — OFFICE VISIT (OUTPATIENT)
Dept: INTERNAL MEDICINE CLINIC | Facility: CLINIC | Age: 26
End: 2021-01-11
Payer: COMMERCIAL

## 2021-01-11 DIAGNOSIS — E10.65 TYPE 1 DIABETES MELLITUS WITH HYPERGLYCEMIA (HCC): ICD-10-CM

## 2021-01-11 DIAGNOSIS — U07.1 COVID-19: Primary | ICD-10-CM

## 2021-01-11 PROCEDURE — 99214 OFFICE O/P EST MOD 30 MIN: CPT | Performed by: NURSE PRACTITIONER

## 2021-01-11 NOTE — ASSESSMENT & PLAN NOTE
Positive covid test 12/22, has had severe symptoms of sob/chest tightness  Was evaluated 1/6 at respiratory clinic and fortunately vitals were stable and cxray was normal   She is using albuterol q 4 even overnight, says this gives relief temporarily and while it is working she takes short walks outside  With DM/hyperglycemia would like to avoid oral steroids but would like to try advair to see if sx are less frequent  She can continue with vitamins, aspirin  Encouraged hydration, nutrition, rest   Though quarantine time frame has elapsed, I explained that her resp sx also need to have shown significant improvement  Will f/u on Wednesday, call with concerns

## 2021-01-11 NOTE — ASSESSMENT & PLAN NOTE
Pt states bs are good when asleep but go high while she is awake  They do come down with insulin but she has needed higher doses  She is scheduled with endo this week for fu  Continue tx as advised    Lab Results   Component Value Date    HGBA1C 6 8 (H) 09/14/2020

## 2021-01-11 NOTE — PROGRESS NOTES
COVID-19 Virtual Visit     Assessment/Plan:    Problem List Items Addressed This Visit        Endocrine    Type 1 diabetes mellitus with hyperglycemia ([facility] [address] )     Pt states bs are good when asleep but go high while she is awake  They do come down with insulin but she has needed higher doses  She is scheduled with endo this week for fu  Continue tx as advised  Lab Results   Component Value Date    HGBA1C 6 8 (H) 09/14/2020               Other    COVID-19 - Primary     Positive covid test 12/22, has had severe symptoms of sob/chest tightness  Was evaluated 1/6 at respiratory clinic and fortunately vitals were stable and cxray was normal   She is using albuterol q 4 even overnight, says this gives relief temporarily and while it is working she takes short walks outside  With DM/hyperglycemia would like to avoid oral steroids but would like to try advair to see if sx are less frequent  She can continue with vitamins, aspirin  Encouraged hydration, nutrition, rest   Though quarantine time frame has elapsed, I explained that her resp sx also need to have shown significant improvement  Will f/u on Wednesday, call with concerns  Relevant Medications    fluticasone-salmeterol (Advair Diskus) 250-50 mcg/dose inhaler         Disposition:     I recommended continued isolation until at least 24 hours have passed since recovery defined as resolution of fever without the use of fever-reducing medications AND improvement in COVID symptoms AND 10 days have passed since onset of symptoms (or 10 days have passed since date of first positive viral diagnostic test for asymptomatic patients)  I have spent 15 minutes directly with the patient  Greater than 50% of this time was spent in counseling/coordination of care regarding: prognosis, risks and benefits of treatment options, instructions for management, patient and family education, importance of treatment compliance, risk factor reductions and impressions  Encounter provider My Ryder, [address]    Provider located at [address] [address]    Recent Visits  Date Type Provider Dept   01/07/21 [address], [address] Internal Med   01/06/21 Telemedicine My Ryder, [address] Internal [facility]   01/04/21 Telemedicine Yaen Hernandez, [address] Internal Med   Showing recent visits within past 7 days and meeting all other requirements     Today's Visits  Date Type Provider Dept   01/11/21 Office Visit yM Mendezmond, [address] Internal Med   Showing today's visits and meeting all other requirements     Future Appointments  No visits were found meeting these conditions  Showing future appointments within next 150 days and meeting all other requirements      This virtual check-in was done via [employer] and patient was informed that this is not a secure, HIPAA-compliant platform  She agrees to proceed  Patient agrees to participate in a virtual check in via telephone or video visit instead of presenting to the office to address urgent/immediate medical needs  Patient is aware this is a billable service  After connecting through [facility] [place] [facility], the patient was identified by name and date of birth  Santo Stout was informed that this was a telemedicine visit and that the exam was being conducted confidentially over secure lines  My office door was closed  No one else was in the room  Santo Sotut acknowledged consent and understanding of privacy and security of the telemedicine visit  I informed the patient that I have reviewed her record in Epic and presented the opportunity for her to ask any questions regarding the visit today  The patient agreed to participate  Subjective:   Santo Stout is a 23 y o  female who has been screened for COVID-19  Symptom change since last report: improving   Patient's symptoms include fatigue, cough, shortness of breath and chest tightness  Patient denies fever, chills, malaise, congestion, rhinorrhea, sore throat, anosmia, loss of taste, abdominal pain, nausea, vomiting, diarrhea, myalgias and headaches  Sera Alva has been staying home and has isolated themselves in her home  She is taking care to not share personal items and is cleaning all surfaces that are touched often, like counters, tabletops, and doorknobs using household cleaning sprays or wipes  Wearing a mask when leaving room?: is not      Lab Results   Component Value Date    SARSCOV2 Negative 08/05/2020    SARSCOV2 Not Detected 06/04/2020    [address] Not Detected 11/13/2020     Past Medical History:   Diagnosis Date    Abdominal pain     Anxiety     Anxiety and depression     Depression     Diabetes ([facility] [address] )     type 1    [facility] 1/17/2019    Eating disorder     history of anorexia/bulemia 0443-4120    Fracture of fibula     R Salter I    Hashimoto's disease 2/21/2020    Head injury     Nasal congestion     Obsessive-compulsive disorder     PTSD (post-traumatic stress disorder)     Rectal bleed     Seizures (HCC)      Past Surgical History:   Procedure Laterality Date    KNEE SURGERY Right 07/06/2020    NASAL SEPTOPLASTY W/ TURBINOPLASTY      SINUS SURGERY      WISDOM TOOTH EXTRACTION      WRIST SURGERY      left;  Excision of ganglion     Current Outpatient Medications   Medication Sig Dispense Refill    albuterol (PROVENTIL HFA,VENTOLIN HFA) 90 mcg/act inhaler Inhale 1 puff every 6 (six) hours as needed for wheezing or shortness of breath 1 Inhaler 5    Altavera 0 15-30 MG-MCG per tablet TAKE ONE TABLET BY MOUTH EVERY DAY 84 tablet 3    amitriptyline (ELAVIL) 10 mg tablet Take 0 5 tablets (5 mg total) by mouth daily at bedtime 30 tablet 1    ARIPiprazole (ABILIFY) 2 mg tablet TAKE 1 TABLET BY MOUTH DAILY 30 tablet 2    benzonatate (TESSALON PERLES) 100 mg capsule Take 1 capsule (100 mg total) by mouth 3 (three) times a day as needed for cough 30 capsule 1    Blood Glucose Monitoring Suppl (ONETOUCH VERIO) w/Device KIT Use as directed  0    cholecalciferol (VITAMIN D3) 1,000 units tablet Take 5,000 Units by mouth daily       clonazePAM (KlonoPIN) 0 5 mg tablet Take 1 tablet (0 5 mg total) by mouth daily 30 tablet 2    clotrimazole-betamethasone (LOTRISONE) 1-0 05 % cream Apply topically as needed       desvenlafaxine succinate (PRISTIQ) 50 mg 24 hr tablet TAKE 1 TABLET BY MOUTH DAILY 30 tablet 2    divalproex sodium (DEPAKOTE ER) 250 mg 24 hr tablet Take 1 tablet (250 mg total) by mouth daily at bedtime Total dose 750 at bedtime 30 tablet 2    divalproex sodium (DEPAKOTE ER) 500 mg 24 hr tablet Take 1 tablet (500 mg total) by mouth daily 30 tablet 2    EPINEPHrine (EPIPEN) 0 3 mg/0 3 mL SOAJ Inject 0 3 mL (0 3 mg total) into a muscle once for 1 dose 0 6 mL 0    fluticasone-salmeterol (Advair Diskus) 250-50 mcg/dose inhaler Inhale 1 puff 2 (two) times a day Rinse mouth after use   3 Inhaler 3    glucagon (GLUCAGON EMERGENCY) 1 MG injection Inject 1 mg under the skin once as needed for low blood sugar for up to 2 doses 1 kit 1    insulin aspart (NovoLOG) 100 units/mL injection Use 30 units per day via pump Disp 1 vial per month (Patient taking differently: Use 100units per day via pump Disp 2 vial per month) 30 mL 1    Insulin Infusion Pump KIT 13 mm cannula, 23 inch tubing change site every 3 days      Insulin Pen Needle (BD PEN NEEDLE NASIM U/F) 32G X 4 MM MISC by Does not apply route 4 (four) times a day for 180 days 400 each 3    methocarbamol (ROBAXIN) 500 mg tablet Take 500 mg by mouth daily as needed for muscle spasms      methocarbamol (ROBAXIN) 500 mg tablet Take 500 mg by mouth 4 (four) times a day      Multiple Vitamin (DAILY VALUE MULTIVITAMIN) TABS Take by mouth daily       ondansetron (ZOFRAN-ODT) 4 mg disintegrating tablet Take 4 mg by mouth every 8 (eight) hours as needed [facility] MISC Patient test 6 times daily 600 each 1    ONETOUCH VERIO test strip Test six times a day 600 each 3     No current facility-administered medications for this visit  Allergies   Allergen Reactions    Insulin Glargine      Burning and redness under skin    Iodides Throat Swelling and Swelling     Reaction Date: 28Jul2014; Action Taken: none; Category: Allergy; Reaction Date: 28Jul2014; Action Taken: none; Category: Allergy;     Iodine        Review of Systems   Constitutional: Positive for fatigue  Negative for chills and fever  HENT: Negative for congestion, rhinorrhea and sore throat  Respiratory: Positive for cough, chest tightness and shortness of breath  Gastrointestinal: Negative for abdominal pain, diarrhea, nausea and vomiting  Musculoskeletal: Negative for myalgias  Neurological: Negative for headaches  Objective: There were no vitals filed for this visit  Physical Exam  Constitutional:       General: She is awake  She is not in acute distress  Appearance: Normal appearance  She is well-developed  She is not ill-appearing, toxic-appearing or diaphoretic  Eyes:      General: Lids are normal       Conjunctiva/sclera: Conjunctivae normal    Pulmonary:      Effort: Pulmonary effort is normal  No tachypnea, accessory muscle usage or respiratory distress  Neurological:      Mental Status: She is alert and oriented to person, place, and time  Psychiatric:         Attention and Perception: Attention normal          Mood and Affect: Mood normal          Speech: Speech normal          Behavior: Behavior normal  Behavior is cooperative  Thought Content: Thought content normal          Cognition and Memory: Cognition normal          Judgment: Judgment normal        VIRTUAL VISIT DISCLAIMER    Ron Nicole acknowledges that she has consented to an online visit or consultation   She understands that the online visit is based solely on information provided by her, and that, in the absence of a face-to-face physical evaluation by the physician, the diagnosis she receives is both limited and provisional in terms of accuracy and completeness  This is not intended to replace a full medical face-to-face evaluation by the physician  Sagar Sneed understands and accepts these terms

## 2021-01-13 ENCOUNTER — TELEMEDICINE (OUTPATIENT)
Dept: BEHAVIORAL/MENTAL HEALTH CLINIC | Facility: CLINIC | Age: 26
End: 2021-01-13
Payer: COMMERCIAL

## 2021-01-13 ENCOUNTER — TELEMEDICINE (OUTPATIENT)
Dept: INTERNAL MEDICINE CLINIC | Facility: CLINIC | Age: 26
End: 2021-01-13
Payer: COMMERCIAL

## 2021-01-13 DIAGNOSIS — F31.4 BIPOLAR DISORDER, CURRENT EPISODE DEPRESSED, SEVERE, WITHOUT PSYCHOTIC FEATURES (HCC): ICD-10-CM

## 2021-01-13 DIAGNOSIS — E10.65 TYPE 1 DIABETES MELLITUS WITH HYPERGLYCEMIA (HCC): ICD-10-CM

## 2021-01-13 DIAGNOSIS — F41.0 GENERALIZED ANXIETY DISORDER WITH PANIC ATTACKS: ICD-10-CM

## 2021-01-13 DIAGNOSIS — F42.2 MIXED OBSESSIONAL THOUGHTS AND ACTS: ICD-10-CM

## 2021-01-13 DIAGNOSIS — F43.12 CHRONIC POST-TRAUMATIC STRESS DISORDER (PTSD): Primary | ICD-10-CM

## 2021-01-13 DIAGNOSIS — F41.1 GENERALIZED ANXIETY DISORDER WITH PANIC ATTACKS: ICD-10-CM

## 2021-01-13 DIAGNOSIS — U07.1 COVID-19: Primary | ICD-10-CM

## 2021-01-13 PROBLEM — F31.9 BIPOLAR AFFECTIVE DISORDER, CURRENT EPISODE SEVERE (HCC): Status: ACTIVE | Noted: 2021-01-13

## 2021-01-13 PROCEDURE — 3725F SCREEN DEPRESSION PERFORMED: CPT | Performed by: NURSE PRACTITIONER

## 2021-01-13 PROCEDURE — 90834 PSYTX W PT 45 MINUTES: CPT | Performed by: SOCIAL WORKER

## 2021-01-13 PROCEDURE — 99213 OFFICE O/P EST LOW 20 MIN: CPT | Performed by: NURSE PRACTITIONER

## 2021-01-13 NOTE — PSYCH
This note was not shared with the patient due to this is a psychotherapy note    Virtual Regular Visit      Assessment/Plan:    Problem List Items Addressed This Visit        Other    Chronic post-traumatic stress disorder (PTSD) - Primary    Generalized anxiety disorder with panic attacks    OCD (obsessive compulsive disorder)    Bipolar affective disorder, current episode severe ([address] )               Reason for visit is No chief complaint on file  Encounter provider Dagmar Denton    Provider located at [id number] [place], [place] [address] [place] [id number]      Recent Visits  Date Type Provider Dept   01/11/21 Office Visit Silverio Zabala, [address] Internal Med   01/07/21 Telemedicine Silverio Zabala, [address] Internal Med   01/06/21 Telemedicine Yane Velzi, [address] Internal Med   Showing recent visits within past 7 days and meeting all other requirements     Today's Visits  Date Type Provider Dept   01/13/21 [facility] [place] [address] Internal Med   01/13/21 [facility] Therapist   Showing today's visits and meeting all other requirements     Future Appointments  No visits were found meeting these conditions  Showing future appointments within next 150 days and meeting all other requirements        The patient was identified by name and date of birth  Eliezer Aleman was informed that this is a telemedicine visit and that the visit is being conducted through [employer] and patient was informed that this is a secure, HIPAA-compliant platform  She agrees to proceed     My office door was closed  No one else was in the room  She acknowledged consent and understanding of privacy and security of the video platform  The patient has agreed to participate and understands they can discontinue the visit at any time  Patient is aware this is a billable service  Subjective  Asia Zepeda is a 23 y o  female    D: Met with Joaquin Hoskins individually  Feels better emotionally though remains 'still really depressed and anxious'  Starting Elavil today for GYN nerve pain - may be helpful as it's also an psych med  Impulsiveness has decreased greatly- returned car, lowered insurance and got same car as before  Remains COVID struggles- still on steroid inhalers  Denied SI    A: Rudyriamanda Gato presented with depressed mood and flattened affect  Ongoing depression and COVID symptoms remain an obstacle  P: Continue individual therapy      HPI     Past Medical History:   Diagnosis Date    Abdominal pain     Anxiety     Anxiety and depression     Depression     Diabetes ([facility] [address] )     type 1    Diabetes mellitus ([facility] [address] ) 1/17/2019    Eating disorder     history of anorexia/bulemia 3030-5255    Fracture of fibula     R Salter I    Hashimoto's disease 2/21/2020    Head injury     Nasal congestion     Obsessive-compulsive disorder     PTSD (post-traumatic stress disorder)     Rectal bleed     Seizures (HCC)        Past Surgical History:   Procedure Laterality Date    KNEE SURGERY Right 07/06/2020    NASAL SEPTOPLASTY W/ TURBINOPLASTY      SINUS SURGERY      WISDOM TOOTH EXTRACTION      WRIST SURGERY      left;  Excision of ganglion       Current Outpatient Medications   Medication Sig Dispense Refill    albuterol (PROVENTIL HFA,VENTOLIN HFA) 90 mcg/act inhaler Inhale 1 puff every 6 (six) hours as needed for wheezing or shortness of breath 1 Inhaler 5    Altavera 0 15-30 MG-MCG per tablet TAKE ONE TABLET BY MOUTH EVERY DAY 84 tablet 3    amitriptyline (ELAVIL) 10 mg tablet Take 0 5 tablets (5 mg total) by mouth daily at bedtime 30 tablet 1    ARIPiprazole (ABILIFY) 2 mg tablet TAKE 1 TABLET BY MOUTH DAILY 30 tablet 2    benzonatate (TESSALON PERLES) 100 mg capsule Take 1 capsule (100 mg total) by mouth 3 (three) times a day as needed for cough 30 capsule 1    Blood Glucose Monitoring Suppl (Paramjit Herrera) w/Device KIT Use as directed  0    cholecalciferol (VITAMIN D3) 1,000 units tablet Take 5,000 Units by mouth daily       clonazePAM (KlonoPIN) 0 5 mg tablet Take 1 tablet (0 5 mg total) by mouth daily 30 tablet 2    clotrimazole-betamethasone (LOTRISONE) 1-0 05 % cream Apply topically as needed       desvenlafaxine succinate (PRISTIQ) 50 mg 24 hr tablet TAKE 1 TABLET BY MOUTH DAILY 30 tablet 2    divalproex sodium (DEPAKOTE ER) 250 mg 24 hr tablet Take 1 tablet (250 mg total) by mouth daily at bedtime Total dose 750 at bedtime 30 tablet 2    divalproex sodium (DEPAKOTE ER) 500 mg 24 hr tablet Take 1 tablet (500 mg total) by mouth daily 30 tablet 2    EPINEPHrine (EPIPEN) 0 3 mg/0 3 mL SOAJ Inject 0 3 mL (0 3 mg total) into a muscle once for 1 dose 0 6 mL 0    fluticasone-salmeterol (Advair Diskus) 250-50 mcg/dose inhaler Inhale 1 puff 2 (two) times a day Rinse mouth after use   3 Inhaler 3    glucagon (GLUCAGON EMERGENCY) 1 MG injection Inject 1 mg under the skin once as needed for low blood sugar for up to 2 doses 1 kit 1    insulin aspart (NovoLOG) 100 units/mL injection Use 30 units per day via pump Disp 1 vial per month (Patient taking differently: Use 100units per day via pump Disp 2 vial per month) 30 mL 1    Insulin Infusion Pump KIT 13 mm cannula, 23 inch tubing change site every 3 days      Insulin Pen Needle (BD PEN NEEDLE NASIM U/F) 32G X 4 MM MISC by Does not apply route 4 (four) times a day for 180 days 400 each 3    methocarbamol (ROBAXIN) 500 mg tablet Take 500 mg by mouth daily as needed for muscle spasms      methocarbamol (ROBAXIN) 500 mg tablet Take 500 mg by mouth 4 (four) times a day      Multiple Vitamin (DAILY VALUE MULTIVITAMIN) TABS Take by mouth daily       ondansetron (ZOFRAN-ODT) 4 mg disintegrating tablet Take 4 mg by mouth every 8 (eight) hours as needed      ONETOUCH DELICA LANCETS 52B MISC Patient test 6 times daily 600 each 1    ONETOUCH VERIO test strip Test six times a day 600 each 3     No current facility-administered medications for this visit  Allergies   Allergen Reactions    Insulin Glargine      Burning and redness under skin    Iodides Throat Swelling and Swelling     Reaction Date: 28Jul2014; Action Taken: none; Category: Allergy; Reaction Date: 28Jul2014; Action Taken: none; Category: Allergy;     Iodine      I spent 50 minutes directly with the patient during this visit      VIRTUAL VISIT DISCLAIMER    Errol Dwyer acknowledges that she has consented to an online visit or consultation  She understands that the online visit is based solely on information provided by her, and that, in the absence of a face-to-face physical evaluation by the physician, the diagnosis she receives is both limited and provisional in terms of accuracy and completeness  This is not intended to replace a full medical face-to-face evaluation by the physician  Errol Dwyer understands and accepts these terms

## 2021-01-13 NOTE — ASSESSMENT & PLAN NOTE
Tested positive 12/22 with a very symptomatic course of cough and sob/chest tightness  She was evaluated 1/6 at the respiratory clinic due to tachypnea and increasing reddy  Fortunately her exam was benign with a normal cxr  She has been using albuterol q4 with only limited improvement  Yesterday started her on advair, today she states she did not have to use any albuterol overnight and has only had to use it twice yesterday  Cough is gradually improving  Still fatigued with decreased appetite but tolerating PO  Will continue current treatment including vitamins, aspirin  Discussed f/u on Friday, pt prefers to call if she feels she needs reassessment  Quarantine time has elapsed, she understands that respiratory sx need to also improve before being released

## 2021-01-13 NOTE — ASSESSMENT & PLAN NOTE
Unchanged presentation from 1/11  Continue with insulin as prescribed, f/u with endo    Lab Results   Component Value Date    HGBA1C 6 8 (H) 09/14/2020

## 2021-01-13 NOTE — PROGRESS NOTES
COVID-19 Virtual Visit     Assessment/Plan:    Problem List Items Addressed This Visit        Endocrine    Type 1 diabetes mellitus with hyperglycemia ([facility] [address] )     Unchanged presentation from 1/11  Continue with insulin as prescribed, f/u with endo  Lab Results   Component Value Date    HGBA1C 6 8 (H) 09/14/2020               Other    COVID-19 - Primary     Tested positive 12/22 with a very symptomatic course of cough and sob/chest tightness  She was evaluated 1/6 at the respiratory clinic due to tachypnea and increasing reddy  Fortunately her exam was benign with a normal cxr  She has been using albuterol q4 with only limited improvement  Yesterday started her on advair, today she states she did not have to use any albuterol overnight and has only had to use it twice yesterday  Cough is gradually improving  Still fatigued with decreased appetite but tolerating PO  Will continue current treatment including vitamins, aspirin  Discussed f/u on Friday, pt prefers to call if she feels she needs reassessment  Quarantine time has elapsed, she understands that respiratory sx need to also improve before being released  Disposition:     I recommended continued isolation until at least 24 hours have passed since recovery defined as resolution of fever without the use of fever-reducing medications AND improvement in COVID symptoms AND 10 days have passed since onset of symptoms (or 10 days have passed since date of first positive viral diagnostic test for asymptomatic patients)  I have spent 10 minutes directly with the patient  Greater than 50% of this time was spent in counseling/coordination of care regarding: prognosis, risks and benefits of treatment options, instructions for management, patient and family education, importance of treatment compliance, risk factor reductions and impressions          Encounter provider Eddie Isidro    Provider located at [address] Mario Snow [address] [place] [id number]    Recent Visits  Date Type Provider Dept   01/11/21 Office Visit Claire Figueroa, [address] Internal Med   01/07/21 Telemedicine Claire Figueroa, [address] Internal Med   01/06/21 Telemedicine Yane Kitchen, [address] Internal [facility]   Showing recent visits within past 7 days and meeting all other requirements     Today's Visits  Date Type Provider Dept   01/13/21 Telemedicine Yane Kitchen, [address] Internal [facility]   Showing today's visits and meeting all other requirements     Future Appointments  No visits were found meeting these conditions  Showing future appointments within next 150 days and meeting all other requirements      This virtual check-in was done via [employer] and patient was informed that this is not a secure, HIPAA-compliant platform  She agrees to proceed  Patient agrees to participate in a virtual check in via telephone or video visit instead of presenting to the office to address urgent/immediate medical needs  Patient is aware this is a billable service  After connecting through [facility], the patient was identified by name and date of birth  Foreign Amin was informed that this was a telemedicine visit and that the exam was being conducted confidentially over secure lines  My office door was closed  No one else was in the room  Foreign Amin acknowledged consent and understanding of privacy and security of the telemedicine visit  I informed the patient that I have reviewed her record in Epic and presented the opportunity for her to ask any questions regarding the visit today  The patient agreed to participate  Subjective:   Foreign Amin is a 23 y o  female who has been screened for COVID-19  Symptom change since last report: improving  Patient's symptoms include fatigue, cough, shortness of breath, chest tightness and nausea   Patient denies fever, chills, malaise, congestion, rhinorrhea, sore throat, anosmia, loss of taste, abdominal pain, vomiting, diarrhea, myalgias and headaches  Michelle Aranda has been staying home and has isolated themselves in her home  She is taking care to not share personal items and is cleaning all surfaces that are touched often, like counters, tabletops, and doorknobs using household cleaning sprays or wipes  Wearing a mask when leaving room?: is not      Lab Results   Component Value Date    SARSCOV2 Negative 08/05/2020    SARSCOV2 Not Detected 06/04/2020    [address] [facility] Not Detected 11/13/2020     Past Medical History:   Diagnosis Date    Abdominal pain     Anxiety     Anxiety and depression     Depression     Diabetes ([facility] [address] )     type 1    Diabetes mellitus ([facility] [address] ) 1/17/2019    Eating disorder     history of anorexia/bulemia [id number]    Fracture of fibula     R Salter I    Hashimoto's disease 2/21/2020    Head injury     Nasal congestion     Obsessive-compulsive disorder     PTSD (post-traumatic stress disorder)     Rectal bleed     Seizures (HCC)      Past Surgical History:   Procedure Laterality Date    KNEE SURGERY Right 07/06/2020    NASAL SEPTOPLASTY W/ TURBINOPLASTY      SINUS SURGERY      WISDOM TOOTH EXTRACTION      WRIST SURGERY      left;  Excision of ganglion     Current Outpatient Medications   Medication Sig Dispense Refill    albuterol (PROVENTIL HFA,VENTOLIN HFA) 90 mcg/act inhaler Inhale 1 puff every 6 (six) hours as needed for wheezing or shortness of breath 1 Inhaler 5    Altavera 0 15-30 MG-MCG per tablet TAKE ONE TABLET BY MOUTH EVERY DAY 84 tablet 3    amitriptyline (ELAVIL) 10 mg tablet Take 0 5 tablets (5 mg total) by mouth daily at bedtime 30 tablet 1    ARIPiprazole (ABILIFY) 2 mg tablet TAKE 1 TABLET BY MOUTH DAILY 30 tablet 2    benzonatate (TESSALON PERLES) 100 mg capsule Take 1 capsule (100 mg total) by mouth 3 (three) times a day as needed for cough 30 capsule 1    Blood Glucose Monitoring Suppl (Shilpa Jimenes) w/Device KIT Use as directed  0    cholecalciferol (VITAMIN D3) 1,000 units tablet Take 5,000 Units by mouth daily       clonazePAM (KlonoPIN) 0 5 mg tablet Take 1 tablet (0 5 mg total) by mouth daily 30 tablet 2    desvenlafaxine succinate (PRISTIQ) 50 mg 24 hr tablet TAKE 1 TABLET BY MOUTH DAILY 30 tablet 2    divalproex sodium (DEPAKOTE ER) 250 mg 24 hr tablet Take 1 tablet (250 mg total) by mouth daily at bedtime Total dose 750 at bedtime 30 tablet 2    divalproex sodium (DEPAKOTE ER) 500 mg 24 hr tablet Take 1 tablet (500 mg total) by mouth daily 30 tablet 2    fluticasone-salmeterol (Advair Diskus) 250-50 mcg/dose inhaler Inhale 1 puff 2 (two) times a day Rinse mouth after use   3 Inhaler 3    glucagon (GLUCAGON EMERGENCY) 1 MG injection Inject 1 mg under the skin once as needed for low blood sugar for up to 2 doses 1 kit 1    insulin aspart (NovoLOG) 100 units/mL injection Use 30 units per day via pump Disp 1 vial per month (Patient taking differently: Use 100units per day via pump Disp 2 vial per month) 30 mL 1    Insulin Infusion Pump KIT 13 mm cannula, 23 inch tubing change site every 3 days      methocarbamol (ROBAXIN) 500 mg tablet Take 500 mg by mouth daily as needed for muscle spasms      methocarbamol (ROBAXIN) 500 mg tablet Take 500 mg by mouth 4 (four) times a day      Multiple Vitamin (DAILY VALUE MULTIVITAMIN) TABS Take by mouth daily       ondansetron (ZOFRAN-ODT) 4 mg disintegrating tablet Take 4 mg by mouth every 8 (eight) hours as needed      ONETOUCH DELICA LANCETS 66J MISC Patient test 6 times daily 600 each 1    ONETOUCH VERIO test strip Test six times a day 600 each 3    clotrimazole-betamethasone (LOTRISONE) 1-0 05 % cream Apply topically as needed       EPINEPHrine (EPIPEN) 0 3 mg/0 3 mL SOAJ Inject 0 3 mL (0 3 mg total) into a muscle once for 1 dose 0 6 mL 0    Insulin Pen Needle (BD PEN NEEDLE NASIM U/F) 32G X 4 MM MISC by Does not apply route 4 (four) times a day for 180 days 400 each 3     No current facility-administered medications for this visit  Allergies   Allergen Reactions    Insulin Glargine      Burning and redness under skin    Iodides Throat Swelling and Swelling     Reaction Date: 28Jul2014; Action Taken: none; Category: Allergy; Reaction Date: 28Jul2014; Action Taken: none; Category: Allergy;     Iodine        Review of Systems   Constitutional: Positive for fatigue  Negative for chills and fever  HENT: Negative for congestion, rhinorrhea and sore throat  Respiratory: Positive for cough, chest tightness and shortness of breath  Gastrointestinal: Positive for nausea  Negative for abdominal pain, diarrhea and vomiting  Musculoskeletal: Negative for myalgias  Neurological: Negative for headaches  Objective: There were no vitals filed for this visit  Physical Exam  Constitutional:       General: She is awake  She is not in acute distress  Appearance: Normal appearance  She is well-developed  She is not ill-appearing or toxic-appearing  Eyes:      General: Lids are normal       Conjunctiva/sclera: Conjunctivae normal    Pulmonary:      Effort: Pulmonary effort is normal  No tachypnea, accessory muscle usage or respiratory distress  Neurological:      Mental Status: She is alert and oriented to person, place, and time  Psychiatric:         Attention and Perception: Attention normal          Mood and Affect: Mood normal          Speech: Speech normal          Behavior: Behavior normal  Behavior is cooperative  Thought Content: Thought content normal          Cognition and Memory: Cognition normal          Judgment: Judgment normal        VIRTUAL VISIT DISCLAIMER    Lisa Montilla acknowledges that she has consented to an online visit or consultation   She understands that the online visit is based solely on information provided by her, and that, in the absence of a face-to-face physical evaluation by the physician, the diagnosis she receives is both limited and provisional in terms of accuracy and completeness  This is not intended to replace a full medical face-to-face evaluation by the physician  Hay Barakat understands and accepts these terms

## 2021-01-15 ENCOUNTER — TELEPHONE (OUTPATIENT)
Dept: NEUROLOGY | Facility: CLINIC | Age: 26
End: 2021-01-15

## 2021-01-15 NOTE — TELEPHONE ENCOUNTER
Called patient to remind her of her EMU admission scheduled for 1/19  Patient would like to cancel admission as she is recovering from [place] and has not been cleared by her doctor  Instructed her to call office to reschedule when she recovers  Patient removed from calendar and called PACS to cancel order

## 2021-01-20 ENCOUNTER — TELEMEDICINE (OUTPATIENT)
Dept: BEHAVIORAL/MENTAL HEALTH CLINIC | Facility: CLINIC | Age: 26
End: 2021-01-20
Payer: COMMERCIAL

## 2021-01-20 DIAGNOSIS — F43.12 CHRONIC POST-TRAUMATIC STRESS DISORDER (PTSD): ICD-10-CM

## 2021-01-20 DIAGNOSIS — F42.2 MIXED OBSESSIONAL THOUGHTS AND ACTS: ICD-10-CM

## 2021-01-20 DIAGNOSIS — F31.4 BIPOLAR DISORDER, CURRENT EPISODE DEPRESSED, SEVERE, WITHOUT PSYCHOTIC FEATURES (HCC): Primary | ICD-10-CM

## 2021-01-20 PROCEDURE — 90834 PSYTX W PT 45 MINUTES: CPT | Performed by: SOCIAL WORKER

## 2021-01-20 NOTE — PSYCH
This note was not shared with the patient due to this is a psychotherapy note    Virtual Regular Visit      Assessment/Plan:    Problem List Items Addressed This Visit        Other    Chronic post-traumatic stress disorder (PTSD)    OCD (obsessive compulsive disorder)    Bipolar affective disorder, current episode severe ([facility] [address] ) - Primary               Reason for visit is No chief complaint on file  Encounter provider Andreina Dorsey    Provider located at [address] [facility]  [address]  [facility] [address]      Recent Visits  Date Type Provider Dept   01/13/21 [facility], [address] Internal Med   01/13/21 [facility] Therapist   Showing recent visits within past 7 days and meeting all other requirements     Today's Visits  Date Type Provider Dept   01/20/21 [facility] Therapist   Showing today's visits and meeting all other requirements     Future Appointments  No visits were found meeting these conditions  Showing future appointments within next 150 days and meeting all other requirements        The patient was identified by name and date of birth  Jaxson Nation was informed that this is a telemedicine visit and that the visit is being conducted through [employer] and patient was informed that this is a secure, HIPAA-compliant platform  She agrees to proceed     My office door was closed  No one else was in the room  She acknowledged consent and understanding of privacy and security of the video platform  The patient has agreed to participate and understands they can discontinue the visit at any time  Patient is aware this is a billable service  Subjective  Jaxson Nation is a 23 y o  female    D: Met with Shyam Nicole individually  'Things are pretty much the same'  Minimal hygiene    Feels may have a fungal infection in scalp - bought special shampoo  Discussed motivation and trying to take some baby steps towards pushing self  Denied SI    A: Yassine Alvarado presented stoic with periods of tearfulness  Contradicting self at times - I wonder if she is more genuine during session (emotions) or opposite  P: Continue individual therapy      HPI     Past Medical History:   Diagnosis Date    Abdominal pain     Anxiety     Anxiety and depression     Depression     Diabetes ([facility] [address] )     type 1    Diabetes mellitus ([facility] ) 1/17/2019    Eating disorder     history of anorexia/bulemia [id number]    Fracture of fibula     R Salter I    Hashimoto's disease 2/21/2020    Head injury     Nasal congestion     Obsessive-compulsive disorder     PTSD (post-traumatic stress disorder)     Rectal bleed     Seizures (HCC)        Past Surgical History:   Procedure Laterality Date    KNEE SURGERY Right 07/06/2020    NASAL SEPTOPLASTY W/ TURBINOPLASTY      SINUS SURGERY      WISDOM TOOTH EXTRACTION      WRIST SURGERY      left;  Excision of ganglion       Current Outpatient Medications   Medication Sig Dispense Refill    albuterol (PROVENTIL HFA,VENTOLIN HFA) 90 mcg/act inhaler Inhale 1 puff every 6 (six) hours as needed for wheezing or shortness of breath 1 Inhaler 5    Altavera 0 15-30 MG-MCG per tablet TAKE ONE TABLET BY MOUTH EVERY DAY 84 tablet 3    amitriptyline (ELAVIL) 10 mg tablet Take 0 5 tablets (5 mg total) by mouth daily at bedtime 30 tablet 1    ARIPiprazole (ABILIFY) 2 mg tablet TAKE 1 TABLET BY MOUTH DAILY 30 tablet 2    benzonatate (TESSALON PERLES) 100 mg capsule Take 1 capsule (100 mg total) by mouth 3 (three) times a day as needed for cough 30 capsule 1    Blood Glucose Monitoring Suppl (ONETOUCH VERIO) w/Device KIT Use as directed  0    cholecalciferol (VITAMIN D3) 1,000 units tablet Take 5,000 Units by mouth daily       clonazePAM (KlonoPIN) 0 5 mg tablet Take 1 tablet (0 5 mg total) by mouth daily 30 tablet 2    clotrimazole-betamethasone (LOTRISONE) 1-0 05 % cream Apply topically as needed       desvenlafaxine succinate (PRISTIQ) 50 mg 24 hr tablet TAKE 1 TABLET BY MOUTH DAILY 30 tablet 2    divalproex sodium (DEPAKOTE ER) 250 mg 24 hr tablet Take 1 tablet (250 mg total) by mouth daily at bedtime Total dose 750 at bedtime 30 tablet 2    divalproex sodium (DEPAKOTE ER) 500 mg 24 hr tablet Take 1 tablet (500 mg total) by mouth daily 30 tablet 2    EPINEPHrine (EPIPEN) 0 3 mg/0 3 mL SOAJ Inject 0 3 mL (0 3 mg total) into a muscle once for 1 dose 0 6 mL 0    fluticasone-salmeterol (Advair Diskus) 250-50 mcg/dose inhaler Inhale 1 puff 2 (two) times a day Rinse mouth after use  3 Inhaler 3    glucagon (GLUCAGON EMERGENCY) 1 MG injection Inject 1 mg under the skin once as needed for low blood sugar for up to 2 doses 1 kit 1    insulin aspart (NovoLOG) 100 units/mL injection Use 30 units per day via pump Disp 1 vial per month (Patient taking differently: Use 100units per day via pump Disp 2 vial per month) 30 mL 1    Insulin Infusion Pump KIT 13 mm cannula, 23 inch tubing change site every 3 days      Insulin Pen Needle (BD PEN NEEDLE NASIM U/F) 32G X 4 MM MISC by Does not apply route 4 (four) times a day for 180 days 400 each 3    methocarbamol (ROBAXIN) 500 mg tablet Take 500 mg by mouth daily as needed for muscle spasms      methocarbamol (ROBAXIN) 500 mg tablet Take 500 mg by mouth 4 (four) times a day      Multiple Vitamin (DAILY VALUE MULTIVITAMIN) TABS Take by mouth daily       ondansetron (ZOFRAN-ODT) 4 mg disintegrating tablet Take 4 mg by mouth every 8 (eight) hours as needed      ONETOUCH DELICA LANCETS 49V MISC Patient test 6 times daily 600 each 1    ONETOUCH VERIO test strip Test six times a day 600 each 3     No current facility-administered medications for this visit           Allergies   Allergen Reactions    Insulin Glargine      Burning and redness under skin    Iodides Throat Swelling and Swelling     Reaction Date: 28Jul2014; Action Taken: none; Category: Allergy; Reaction Date: 28Jul2014; Action Taken: none; Category: Allergy;     Iodine             I spent 50 minutes directly with the patient during this visit      VIRTUAL VISIT DISCLAIMER    Eduardo Clarke acknowledges that she has consented to an online visit or consultation  She understands that the online visit is based solely on information provided by her, and that, in the absence of a face-to-face physical evaluation by the physician, the diagnosis she receives is both limited and provisional in terms of accuracy and completeness  This is not intended to replace a full medical face-to-face evaluation by the physician  Eduardo Clarke understands and accepts these terms

## 2021-01-28 ENCOUNTER — OFFICE VISIT (OUTPATIENT)
Dept: NEUROLOGY | Facility: CLINIC | Age: 26
End: 2021-01-28
Payer: COMMERCIAL

## 2021-01-28 VITALS
HEART RATE: 92 BPM | WEIGHT: 156 LBS | SYSTOLIC BLOOD PRESSURE: 106 MMHG | BODY MASS INDEX: 29.45 KG/M2 | HEIGHT: 61 IN | DIASTOLIC BLOOD PRESSURE: 74 MMHG

## 2021-01-28 DIAGNOSIS — F41.0 GENERALIZED ANXIETY DISORDER WITH PANIC ATTACKS: ICD-10-CM

## 2021-01-28 DIAGNOSIS — R51.9 HEADACHE: ICD-10-CM

## 2021-01-28 DIAGNOSIS — R26.9 UNSPECIFIED ABNORMALITIES OF GAIT AND MOBILITY: ICD-10-CM

## 2021-01-28 DIAGNOSIS — M62.81 MUSCLE WEAKNESS: Primary | ICD-10-CM

## 2021-01-28 DIAGNOSIS — R93.0 ABNORMAL MRI OF HEAD: ICD-10-CM

## 2021-01-28 DIAGNOSIS — R41.3 MEMORY DIFFICULTIES: ICD-10-CM

## 2021-01-28 DIAGNOSIS — F41.1 GENERALIZED ANXIETY DISORDER WITH PANIC ATTACKS: ICD-10-CM

## 2021-01-28 PROCEDURE — 1036F TOBACCO NON-USER: CPT | Performed by: STUDENT IN AN ORGANIZED HEALTH CARE EDUCATION/TRAINING PROGRAM

## 2021-01-28 PROCEDURE — 3008F BODY MASS INDEX DOCD: CPT | Performed by: STUDENT IN AN ORGANIZED HEALTH CARE EDUCATION/TRAINING PROGRAM

## 2021-01-28 PROCEDURE — 99245 OFF/OP CONSLTJ NEW/EST HI 55: CPT | Performed by: STUDENT IN AN ORGANIZED HEALTH CARE EDUCATION/TRAINING PROGRAM

## 2021-01-28 RX ORDER — CLONAZEPAM 0.5 MG/1
0.5 TABLET ORAL 2 TIMES DAILY
Qty: 30 TABLET | Refills: 2
Start: 2021-01-28 | End: 2021-04-13 | Stop reason: SDUPTHER

## 2021-01-28 NOTE — PATIENT INSTRUCTIONS
Will check more blood work today    MRI cervical spine will be scheduled      Will see Dr Nasrin Morgan     Follow up with me in 6 months

## 2021-01-28 NOTE — PROGRESS NOTES
[facility] -   Follow up appointment    Impression/Plan    Eduardo Clarke is a 23 y o  right handed female with a PMH of type 1 diabetes and presenting to the [facility] for follow up of multiple issues: left leg numbness distal to the knee, foot cramps, right hand weakness, walking issues, confusion, memory issues, headache, hot feet, floaters in her vision, speech problems, and fatigue  On 1/28/2021 the patient is no longer experiencing episodes concerning for seizure  We will defer plan for EMU stay  I was able to talk to the patient more extensively about her symptoms, particularly her weakness, burning in her feet and left lower leg numbness  There are some inconsistencies with her examination which make me concerned that there may be some functional components or at least an embellishment of symptoms  The fluctuating nature and slowly worsening symptoms over time is comparable to someone who may have a demyelinating disease  With a history of DM1, she also is at a higher risk or rheumatologic diseases that can mimic MS  Thirdly, her MRI brain while stable, has lesions which could represent a history of demyelinating disease  From what I know she hasn't had her spine imaged before  It is reasonable to work this up further as I do not see a cerebral or peripheral nerve pathology that can explain her lower leg symptoms  Plan outlined below:    #Left leg numbness, bilateral leg weakness, gait abnormality  -MRI C spine w/w/o contrast  -Checking ESR and CRP  - Will place referral to Dr Lucrecia Fang to evaluate for possible demyelinating disease or MS-mimic  -May refer to PT in the future for gait abnormality    #Memory issues:  -Most likely 2/2 mental health  - Checking LFTs, routine memory issue labs (glucose, TSH, B12, CMP, CBC and lyme) have all been unremarkable      - Follow up with me in 6 months and/or with Dr Lucrecia Fang    There are no diagnoses linked to this encounter  Jo-Ann Murrayharpreet Wong is a 23 y o  right handed female with a PMH of type 1 diabetes and presenting to the [place] [address] [facility] for follow up of multiple issues: left leg numbness distal to the knee, foot cramps, right hand weakness, walking issues, confusion, memory issues, headache, hot feet, floaters in her vision, speech problems, and fatigue  For review: This patient was initially referred to my clinic by behavioral health  This first appointment was 10/16/2020  Her main complaints were hand weakness and cognitive complaints  Her weakness issues slowly developed over April/May of 2020  Symptoms were continuous but appear to worsen with activity  The right hand had issues opening bottles but she could write OK  Other jobs with dexterity were difficult as well  With walking, she felt like her legs were heavy, like she "was walking with two cinder blocks"  Weakness involved the whole leg  These symptoms transiently improved for two weeks in the beginning of September 2020 but everything came back after that      Because of the numbness in the left leg she was started on gabapentin but this was stopped since "it didn't work"       She was taking classes for college in business and she would have trouble concentrating on the tasks at hand  She said that she can forget how to do simple addition when she is stress  She understands stress is a big factor into what is going on  While discussing multiple issues at the time of her first appointment she mentioned an episode of unresponsiveness when she was in an argument with a family member  She just walked away from family and next thing she remembers she was in the arms of her family  Her mother said that she was stuttering her speech and then she was shaking  She was also hitting and crying but the patient doesn't remember this   No bowel/bladder dysfunction      She also endorsed episodes in the past of passing out due to "low blood sugar" with the last episode three weeks prior to the first appointment  She denied any symptoms preceding the episodes of LOC  She boyfriend said that she will have shaking of the hands, eye rolls back and be unresponive  Plan at that time was check B12/folate, lyme, CK, EMG/NCS, routine EEG and plan for EMU stay  Interval history:   Since last seen, EMU stay was deferred due to concerns regarding COVID  Insurance required an MRI prior to EMU admission so this was also done  The MRI showed a single punctate right frontal lobe juxtacortical white matter hyperintensity  There was also additional subtle FLAIR changes seen in the occipital lobes posterior to the occipital horns of the lateral ventricles  These findings were unchanged from 2014 with no progression of white matter change  EMG/NCS was normal  Routine EEG captured awake and drowsy states and was normal     She still notes that she has problems with her memory, particularly with putting ideas together  She says that her memory issues started when in highschool when she had a concussion  She had completed concussion training at that time  She does feel that her memory has worsened since then  She hasn't had any more issues regarding her seizures or paroxysmal spells  She notes cramping at night in her arms and legs which occurs multiple times a day  These last for a couple of minutes at a time  The calves are the most uncomfortable  She still complains on numbness on the lateral aspect of the left leg  This symptom started in June 2020 suddenly  Gabapentin was trailed but stopped b/c it didn't help  She experiences a numbness/tingling of the left leg  She thinks it has slowly gotten more noticeable to her  She leg weakness that she has transiently improved never done physical therapy to help with her symptoms  Her leg weakness symptoms comes in waves, lasting months at a time        She feels that her mental health is getting better, recent medications have lead to less impulsivity  Depakote 750 24h tablet daily since October has been the most helpful  She hasn't noticed an improvement in her HA with this medication  She also continues to complain of a right frontal sided headache, which is pulsing and continuous over the past year  Its rated 3/10 and sometimes worsens to 4-5/10  She has taken tylenol and excedrin which doesn't really help  She uses these medications rarely and sleep usually helps  Out of all the issues she has, she feels that she can't properly feel the left side of her leg and weakness in the legs  She says these symptoms have been present since she was 11  She notes that these episodes are static over the years but she can have fluctuations in the severity  History Reviewed: The following were reviewed and updated as appropriate: allergies, current medications, past family history, past medical history, past social history, past surgical history and problem list    ROS:  Review of Systems   Constitutional: Negative  Negative for appetite change and fever  HENT: Negative  Negative for hearing loss, tinnitus, trouble swallowing and voice change  Eyes: Negative  Negative for photophobia and pain  Respiratory: Negative  Negative for shortness of breath  Cardiovascular: Negative  Negative for palpitations  Gastrointestinal: Negative  Negative for nausea and vomiting  Endocrine: Negative  Negative for cold intolerance  Genitourinary: Negative  Negative for dysuria, frequency and urgency  Musculoskeletal: Negative  Negative for myalgias and neck pain  Skin: Negative  Negative for rash  Neurological: Positive for numbness (Left Leg) and headaches  Negative for dizziness, tremors, seizures, syncope, facial asymmetry, speech difficulty, weakness and light-headedness  Hematological: Negative  Does not bruise/bleed easily  Psychiatric/Behavioral: Negative   Negative for confusion, hallucinations and sleep disturbance  Objective    /74 (BP Location: Left arm, Patient Position: Sitting, Cuff Size: Standard)   Pulse 92   Ht 5' 1" (1 549 m)   Wt 70 8 kg (156 lb)   BMI 29 48 kg/m²      General Exam  General: well developed, no acute distress  HEENT: mucous membranes moist, anicteric sclera  Neck: supple, good ROM  Heart: regular rate and rhythm, no murmur  Chest: clear to auscultation bilaterally  Extremities: no clubbing, cyanosis or edema  Skin: no rash on visible skin  Neurological Exam  Mental Status: awake, alert, and fully oriented to person, place, time, and situation  Attention and memory intact  Fund of knowledge is appropriate for age and education  There is no neglect  Language: fluency, and comprehension normal        Cranial Nerves: Pupils equal and reactive to light  Visual fields full to confrontation  Extraocular motions intact with full versions, normal pursuits and saccades  Facial strength full and symmetric  Facial sensation intact in V1-V3  Hearing intact to finger rub bilaterally  Tongue protrudes to midline  Palate elevates symmetrically  Speech clear without notable dysarthria  Shoulder shrug activation full and symmetric  Motor: Normal bulk and tone  No pronator drift  Strength is 5/5 proximally and distally in all 4 extremities  No involuntary movements  Sensory: Sensation intact to light touch distally in all extremities except for decreased sensation below the knee on both medial and lateral aspect of the lower leg, the sensation loss was the same in proximal and distal lower leg/foot/toes  Vibration decreased in b/l 1st toe but equally effected in the left knee  There was some testing inconsistency in the vibration testing  Proprioception testing showed difficulties with sensing 1st toe extension but never with flexion in b/l toes  Coordination: Normal finger-to-nose  Normal rapid alternating movements  Station and gait: Casual gait shuffling in nature with short steps  Able to walk in toes and heels  Tandem normal  Romberg testing initially with retropulsion but with repeated testing was normal   Reflexes: Reflexes 2+ throughout and symmetric  Both toes down going        Magalys Farris MD   [facility]  [facility] [facility] Clinical Neurophysiology

## 2021-01-28 NOTE — PROGRESS NOTES
Review of Systems   Constitutional: Negative  Negative for appetite change and fever  HENT: Negative  Negative for hearing loss, tinnitus, trouble swallowing and voice change  Eyes: Negative  Negative for photophobia and pain  Respiratory: Negative  Negative for shortness of breath  Cardiovascular: Negative  Negative for palpitations  Gastrointestinal: Negative  Negative for nausea and vomiting  Endocrine: Negative  Negative for cold intolerance  Genitourinary: Negative  Negative for dysuria, frequency and urgency  Musculoskeletal: Negative  Negative for myalgias and neck pain  Skin: Negative  Negative for rash  Neurological: Positive for numbness (Left Leg) and headaches  Negative for dizziness, tremors, seizures, syncope, facial asymmetry, speech difficulty, weakness and light-headedness  Hematological: Negative  Does not bruise/bleed easily  Psychiatric/Behavioral: Negative  Negative for confusion, hallucinations and sleep disturbance

## 2021-02-03 ENCOUNTER — TELEPHONE (OUTPATIENT)
Dept: PSYCHIATRY | Facility: CLINIC | Age: 26
End: 2021-02-03

## 2021-02-03 ENCOUNTER — TELEPHONE (OUTPATIENT)
Dept: NEUROLOGY | Facility: CLINIC | Age: 26
End: 2021-02-03

## 2021-02-03 ENCOUNTER — TELEMEDICINE (OUTPATIENT)
Dept: PSYCHIATRY | Facility: CLINIC | Age: 26
End: 2021-02-03
Payer: COMMERCIAL

## 2021-02-03 DIAGNOSIS — F41.0 GENERALIZED ANXIETY DISORDER WITH PANIC ATTACKS: ICD-10-CM

## 2021-02-03 DIAGNOSIS — F43.12 CHRONIC POST-TRAUMATIC STRESS DISORDER (PTSD): ICD-10-CM

## 2021-02-03 DIAGNOSIS — F41.1 GENERALIZED ANXIETY DISORDER WITH PANIC ATTACKS: ICD-10-CM

## 2021-02-03 DIAGNOSIS — F31.63 BIPOLAR DISORDER, CURRENT EPISODE MIXED, SEVERE, WITHOUT PSYCHOTIC FEATURES (HCC): ICD-10-CM

## 2021-02-03 DIAGNOSIS — F42.2 MIXED OBSESSIONAL THOUGHTS AND ACTS: Primary | ICD-10-CM

## 2021-02-03 PROCEDURE — 99213 OFFICE O/P EST LOW 20 MIN: CPT | Performed by: PSYCHIATRY & NEUROLOGY

## 2021-02-03 NOTE — TELEPHONE ENCOUNTER
Spoke with patient and let her know that I was calling to schedule her for her ANTWAN that has been approved from Dr Waqas Francisco to Dr Altaf Ferguson - we have scheduled pt for 4/14 @ 9a in [place] - pt is anxious to get in so I have placed her on cancellation list

## 2021-02-03 NOTE — PSYCH
Virtual Regular Visit      Assessment/Plan:    Problem List Items Addressed This Visit        Other    Bipolar affective disorder, current episode severe ([address] )    Chronic post-traumatic stress disorder (PTSD)    Generalized anxiety disorder with panic attacks    OCD (obsessive compulsive disorder) - Primary               Reason for visit is   Chief Complaint   Patient presents with    Virtual Regular Visit        Encounter provider Dharmesh Nunez MD    Provider located at [address] [place] [id number] [phone]      Recent Visits  Date Type Provider Dept   02/03/21 Telephone Dharmesh Nunez MD [facility]   02/03/21 Lyubov Bravo MD [facility] recent visits within past 7 days and meeting all other requirements     Future Appointments  No visits were found meeting these conditions  Showing future appointments within next 150 days and meeting all other requirements        The patient was identified by name and date of birth  Zeke Morris was informed that this is a telemedicine visit and that the visit is being conducted through [employer] and patient was informed that this is a secure, HIPAA-compliant platform  She agrees to proceed     My office door was closed  No one else was in the room  She acknowledged consent and understanding of privacy and security of the video platform  The patient has agreed to participate and understands they can discontinue the visit at any time  Patient is aware this is a billable service  Subjective  Zeke Morris is a 27 y o  female with Bipolar do  Sera Leaver remains compliant with medications and denies side effects  She stated she is still recovering from [place] 19 infection  She also recently started online school and has been having more anxiety   She stated she is trying to take less courses at one time to reduce her stress level  She has not been able to get VA level done  She continues to meet with her counselor on a regular basis  She is not sure about making medication changes at this time but stated that if continues to feel depressed and anxious she will let me know before her next appointment so I can make a medication adjustment for her  Agrees to schedule follow up in 6-8 weeks  HPI     Past Medical History:   Diagnosis Date    Abdominal pain     Anxiety     Anxiety and depression     Depression     Diabetes ([facility] )     type 1    Diabetes mellitus ([facility] ) 1/17/2019    Eating disorder     history of anorexia/bulemia [id number]    Fracture of fibula     R Salter I    Hashimoto's disease 2/21/2020    Head injury     Nasal congestion     Obsessive-compulsive disorder     PTSD (post-traumatic stress disorder)     Rectal bleed     Seizures (HCC)        Past Surgical History:   Procedure Laterality Date    KNEE SURGERY Right 07/06/2020    NASAL SEPTOPLASTY W/ TURBINOPLASTY      SINUS SURGERY      WISDOM TOOTH EXTRACTION      WRIST SURGERY      left;  Excision of ganglion       Current Outpatient Medications   Medication Sig Dispense Refill    albuterol (PROVENTIL HFA,VENTOLIN HFA) 90 mcg/act inhaler Inhale 1 puff every 6 (six) hours as needed for wheezing or shortness of breath 1 Inhaler 5    Altavera 0 15-30 MG-MCG per tablet TAKE ONE TABLET BY MOUTH EVERY DAY 84 tablet 3    amitriptyline (ELAVIL) 10 mg tablet Take 0 5 tablets (5 mg total) by mouth daily at bedtime 30 tablet 1    ARIPiprazole (ABILIFY) 2 mg tablet TAKE 1 TABLET BY MOUTH DAILY 30 tablet 2    Blood Glucose Monitoring Suppl (ONETOUCH VERIO) w/Device KIT Use as directed  0    cholecalciferol (VITAMIN D3) 1,000 units tablet Take 5,000 Units by mouth daily       clonazePAM (KlonoPIN) 0 5 mg tablet Take 1 tablet (0 5 mg total) by mouth 2 (two) times a day 30 tablet 2    clotrimazole-betamethasone (LOTRISONE) 1-0 05 % cream Apply topically as needed       desvenlafaxine succinate (PRISTIQ) 50 mg 24 hr tablet TAKE 1 TABLET BY MOUTH DAILY 30 tablet 2    divalproex sodium (DEPAKOTE ER) 250 mg 24 hr tablet Take 1 tablet (250 mg total) by mouth daily at bedtime Total dose 750 at bedtime 30 tablet 2    divalproex sodium (DEPAKOTE ER) 500 mg 24 hr tablet Take 1 tablet (500 mg total) by mouth daily 30 tablet 2    EPINEPHrine (EPIPEN) 0 3 mg/0 3 mL SOAJ Inject 0 3 mL (0 3 mg total) into a muscle once for 1 dose 0 6 mL 0    fluticasone-salmeterol (Advair Diskus) 250-50 mcg/dose inhaler Inhale 1 puff 2 (two) times a day Rinse mouth after use  3 Inhaler 3    glucagon (GLUCAGON EMERGENCY) 1 MG injection Inject 1 mg under the skin once as needed for low blood sugar for up to 2 doses 1 kit 1    insulin aspart (NovoLOG) 100 units/mL injection Use 30 units per day via pump Disp 1 vial per month (Patient taking differently: Use 100units per day via pump Disp 2 vial per month) 30 mL 1    Insulin Infusion Pump KIT 13 mm cannula, 23 inch tubing change site every 3 days      Insulin Pen Needle (BD PEN NEEDLE NASIM U/F) 32G X 4 MM MISC by Does not apply route 4 (four) times a day for 180 days 400 each 3    methocarbamol (ROBAXIN) 500 mg tablet Take 500 mg by mouth daily as needed for muscle spasms      methocarbamol (ROBAXIN) 500 mg tablet Take 500 mg by mouth 4 (four) times a day      Multiple Vitamin (DAILY VALUE MULTIVITAMIN) TABS Take by mouth daily       ondansetron (ZOFRAN-ODT) 4 mg disintegrating tablet Take 4 mg by mouth every 8 (eight) hours as needed      ONETOUCH DELICA LANCETS 30G MISC Patient test 6 times daily 600 each 1    ONETOUCH VERIO test strip Test six times a day 600 each 3     No current facility-administered medications for this visit           Allergies   Allergen Reactions    Insulin Glargine      Burning and redness under skin    Iodides Throat Swelling and Swelling     Reaction Date: [id number]; Action Taken: none; Category: Allergy; Reaction Date: 28Jul2014; Action Taken: none; Category: Allergy;     Iodine        Review of Systems       Mood Anxiety, Depression and Emotional Lability   Behavior Compulsive Behavior and Impulsive Behavior   Thought Content Disturbing Thoughts, Feelings   General Emotional Problems and Decreased Functioning   Personality Normal   Other Psych Symptoms Normal   Constitutional Negative   ENT Negative   Cardiovascular Negative   Respiratory Negative   Gastrointestinal Negative   Genitourinary Negative   Musculoskeletal Negative   Integumentary Negative   Neurological Negative   Endocrine Normal    Other Symptoms Normal              Laboratory Results: No results found for this or any previous visit      Substance Abuse History:  Social History     Substance and Sexual Activity   Drug Use No       Family Psychiatric History:   Family History   Problem Relation Age of Onset    Hypertension Mother    Osker Ok Migraines Mother         Headache    Diabetes type II Mother     Varicose Veins Mother     Hyperlipidemia Mother     Diabetes Mother     Arthritis Mother     Depression Mother     Cholelithiasis Father     Hypertension Father     Sarcoidosis Father         Liver    Hyperlipidemia Father     Diabetes Father     Coronary artery disease Father     Nephrolithiasis Father     Cirrhosis Father     Alcohol abuse Father     Thyroid disease Sister     Cancer Family     Diabetes Family     Hypertension Family     Alcohol abuse Brother        The following portions of the patient's history were reviewed and updated as appropriate: allergies, current medications, past family history, past medical history, past social history, past surgical history and problem list     Social History     Socioeconomic History    Marital status: Single     Spouse name: Not on file    Number of children: 0    Years of education: Not on file    Highest education level: Associate degree: academic program   Occupational History    Occupation: unemployed   Social Needs    Financial resource strain: Somewhat hard   Deyanira-Shakira insecurity     Worry: Not on file     Inability: Not on file    Transportation needs     Medical: Not on file     Non-medical: Not on file   Tobacco Use    Smoking status: Never Smoker    Smokeless tobacco: Never Used    Tobacco comment: Tobacco smoke exposure (Father smokes cigars)   Substance and Sexual Activity    Alcohol use: Not Currently     Frequency: Monthly or less     Drinks per session: 1 or 2     Binge frequency: Never     Comment: socially      Drug use: No    Sexual activity: Not Currently     Partners: Male     Birth control/protection: Condom Male, OCP   Lifestyle    Physical activity     Days per week: 7 days     Minutes per session: 10 min    Stress: Rather much   Relationships    Social connections     Talks on phone: More than three times a week     Gets together: Not on file     Attends Religion service: Not on file     Active member of club or organization: No     Attends meetings of clubs or organizations: Never     Relationship status: Never     Intimate partner violence     Fear of current or ex partner: No     Emotionally abused: No     Physically abused: No     Forced sexual activity: No   Other Topics Concern    Not on file   Social History Narrative    Student at [employer] a poor diet; low in vegetables, high in sweets    Dental care, regularly    Lives with parents    Sleeps 8-10 hours a day     Social History     Social History Narrative    Student at [employer] a poor diet; low in vegetables, high in sweets    Dental care, regularly    Lives with parents    Sleeps 8-10 hours a day       Objective:       Mental status:  Appearance calm and cooperative , adequate hygiene and grooming and good eye contact    Mood dysphoric   Affect affect was constricted   Speech a normal rate and fluent   Thought Processes coherent/organized and normal thought processes   Hallucinations no hallucinations present    Thought Content no delusions   Abnormal Thoughts no suicidal thoughts  and no homicidal thoughts    Orientation  oriented to person and place and time   Remote Memory short term memory intact and long term memory intact   Attention Span concentration impaired   Intellect Appears to be of Average Intelligence   Insight Limited insight   Judgement judgment was limited   Muscle Strength n/a   Language no difficulty naming common objects and no difficulty repeating a phrase    Fund of Knowledge displays adequate knowledge of current events               Assessment/Plan:       Diagnoses and all orders for this visit:    Mixed obsessional thoughts and acts    Generalized anxiety disorder with panic attacks    Chronic post-traumatic stress disorder (PTSD)    Bipolar disorder, current episode mixed, severe, without psychotic features ([facility] )            Treatment Recommendations- Risks Benefits      Immediate Medical/Psychiatric/Psychotherapy Treatments and Any Precautions: continue current treatment  Obtain VA level  Risks, Benefits And Possible Side Effects Of Medications:  {PSYCH RISK, BENEFITS AND POSSIBLE SIDE EFFECTS (Optional):60155    Controlled Medication Discussion: Discussed with patient Black Box warning on concurrent use of benzodiazepines and opioid medications including sedation, respiratory depression, coma and death  Patient understands the risk of treatment with benzodiazepines in addition to opioids and wants to continue taking those medications  , Discussed with patient the risks of sedation, respiratory depression, impairment of ability to drive and potential for abuse and addiction related to treatment with benzodiazepine medications  The patient understands risk of treatment with benzodiazepine medications, agrees to not drive if feels impaired and agrees to take medications as prescribed   and The patient has been filling controlled prescriptions on time as prescribed to [facility] [address] [facility] program       Psychotherapy Provided:     Individual psychotherapy provided: No                  I spent 20 minutes directly with the patient during this visit      VIRTUAL VISIT DISCLAIMER    Foreign Amin acknowledges that she has consented to an online visit or consultation  She understands that the online visit is based solely on information provided by her, and that, in the absence of a face-to-face physical evaluation by the physician, the diagnosis she receives is both limited and provisional in terms of accuracy and completeness  This is not intended to replace a full medical face-to-face evaluation by the physician  Foreign Amin understands and accepts these terms

## 2021-02-05 ENCOUNTER — TELEPHONE (OUTPATIENT)
Dept: NEUROLOGY | Facility: CLINIC | Age: 26
End: 2021-02-05

## 2021-02-05 NOTE — TELEPHONE ENCOUNTER
----- Message from Etelvina Perera MD sent at 1/28/2021  2:07 PM EST -----  Regarding: RE: Transfer of care  Hi -  As we discussed - I accepted the patient, will be happy to see her for evaluation   Thank you,  CORY Espitia   ----- Message -----  From: Governor Mahsa MD  Sent: 1/28/2021  12:58 PM EST  To: Bevely Fothergill, MD  Subject: Transfer of care                                 I am following Melvin Collazo in the office for eval for possible demyelinating disease  I ordered MRI C spine w/w/o contrast as well as serum CPK and ESR  The intent of your evaluation would be for a transfer of care  If agreeable, the patient should be scheduled with specialty attending and previously scheduled follow up appointments scheduled with me (transferring doctor) will be cancelled      Thanks,    Governor Mahsa MD

## 2021-02-09 ENCOUNTER — TELEMEDICINE (OUTPATIENT)
Dept: INTERNAL MEDICINE CLINIC | Facility: CLINIC | Age: 26
End: 2021-02-09
Payer: COMMERCIAL

## 2021-02-09 VITALS — WEIGHT: 156 LBS | BODY MASS INDEX: 29.45 KG/M2 | HEIGHT: 61 IN

## 2021-02-09 DIAGNOSIS — J01.90 ACUTE NON-RECURRENT SINUSITIS, UNSPECIFIED LOCATION: Primary | ICD-10-CM

## 2021-02-09 PROCEDURE — 99213 OFFICE O/P EST LOW 20 MIN: CPT | Performed by: NURSE PRACTITIONER

## 2021-02-09 RX ORDER — AMOXICILLIN AND CLAVULANATE POTASSIUM 875; 125 MG/1; MG/1
1 TABLET, FILM COATED ORAL EVERY 12 HOURS SCHEDULED
Qty: 14 TABLET | Refills: 0 | Status: SHIPPED | OUTPATIENT
Start: 2021-02-09 | End: 2021-02-16

## 2021-02-09 RX ORDER — IBUPROFEN 600 MG/1
TABLET ORAL
COMMUNITY
Start: 2020-12-16 | End: 2021-03-18

## 2021-02-09 NOTE — ASSESSMENT & PLAN NOTE
Pt c/o left eye swelling and orbital pain, maxillary tenderness, and nasal congestion as well as headache  No nasal discharge, she is unable to breathe out of her nose  No eye drainage or conjunctival inflammation  Exam limited but no evidence of blepharitis, chalazion, or hordeolum  Not relieved with warm compress  No fever or chills but notes her sugars have been higher than average, which she says is usually a sign that "something is going on "  Would like to treat with augmentin x 7 day and can use ibuprofen prn   pt advised to call right away if sx worsen and also call back if sx do not improve

## 2021-02-09 NOTE — PROGRESS NOTES
Virtual Regular Visit      Assessment/Plan:    Problem List Items Addressed This Visit        Respiratory    Acute non-recurrent sinusitis - Primary     Pt c/o left eye swelling and orbital pain, maxillary tenderness, and nasal congestion as well as headache  No nasal discharge, she is unable to breathe out of her nose  No eye drainage or conjunctival inflammation  Exam limited but no evidence of blepharitis, chalazion, or hordeolum  Not relieved with warm compress  No fever or chills but notes her sugars have been higher than average, which she says is usually a sign that "something is going on "  Would like to treat with augmentin x 7 day and can use ibuprofen prn   pt advised to call right away if sx worsen and also call back if sx do not improve  Relevant Medications    amoxicillin-clavulanate (AUGMENTIN) 875-125 mg per tablet               Reason for visit is   Chief Complaint   Patient presents with    Virtual Regular Visit        Encounter provider Karley Baron, [address] [place] [address]    Provider located at [address] [id number]      Recent Visits  No visits were found meeting these conditions  Showing recent visits within past 7 days and meeting all other requirements     Today's Visits  Date Type Provider Dept   02/09/21 Telemedicine Yane Williamson, [address] Internal Med   Showing today's visits and meeting all other requirements     Future Appointments  No visits were found meeting these conditions  Showing future appointments within next 150 days and meeting all other requirements        The patient was identified by name and date of birth  Jim Pan was informed that this is a telemedicine visit and that the visit is being conducted through [address] [place] [address] and patient was informed that this is not a secure, HIPAA-compliant platform  She agrees to proceed     My office door was closed  No one else was in the room  She acknowledged consent and understanding of privacy and security of the video platform  The patient has agreed to participate and understands they can discontinue the visit at any time  Patient is aware this is a billable service  Subjective  Junior Byrnes is a 27 y o  female calls for evaluation of eye swelling  Swelling present several days, worsened overnight  She denies redness, drainage, vision change  The swelling is making it difficult to open her eye fully and the upper lid is slightly blocking her upper visual field  No improvement with warm compresses  She has pain with EOM's and orbital pain/tenderness  She also has maxillary tenderness with bilateral sinus congestion  She says she can not breathe out of her nose at all  Denies fever, chills  States her sugars have been above average which is an indicator to her that "something is going on "        HPI     Past Medical History:   Diagnosis Date    Abdominal pain     Anxiety     Anxiety and depression     Depression     Diabetes ([facility] [address] )     type 1    Diabetes mellitus ([facility] [address] ) 1/17/2019    Eating disorder     history of anorexia/bulemia [id number]    Fracture of fibula     R Salter I    Hashimoto's disease 2/21/2020    Head injury     Nasal congestion     Obsessive-compulsive disorder     PTSD (post-traumatic stress disorder)     Rectal bleed     Seizures (HCC)        Past Surgical History:   Procedure Laterality Date    KNEE SURGERY Right 07/06/2020    NASAL SEPTOPLASTY W/ TURBINOPLASTY      SINUS SURGERY      WISDOM TOOTH EXTRACTION      WRIST SURGERY      left;  Excision of ganglion       Current Outpatient Medications   Medication Sig Dispense Refill    albuterol (PROVENTIL HFA,VENTOLIN HFA) 90 mcg/act inhaler Inhale 1 puff every 6 (six) hours as needed for wheezing or shortness of breath 1 Inhaler 5    Altavera 0 15-30 MG-MCG per tablet TAKE ONE TABLET BY MOUTH EVERY DAY 84 tablet 3    ARIPiprazole (ABILIFY) 2 mg tablet TAKE 1 TABLET BY MOUTH DAILY 30 tablet 2    Blood Glucose Monitoring Suppl (Harsha Burrows) w/Device KIT Use as directed  0    cholecalciferol (VITAMIN D3) 1,000 units tablet Take 5,000 Units by mouth daily       clonazePAM (KlonoPIN) 0 5 mg tablet Take 1 tablet (0 5 mg total) by mouth 2 (two) times a day 30 tablet 2    desvenlafaxine succinate (PRISTIQ) 50 mg 24 hr tablet TAKE 1 TABLET BY MOUTH DAILY 30 tablet 2    divalproex sodium (DEPAKOTE ER) 250 mg 24 hr tablet Take 1 tablet (250 mg total) by mouth daily at bedtime Total dose 750 at bedtime 30 tablet 2    divalproex sodium (DEPAKOTE ER) 500 mg 24 hr tablet Take 1 tablet (500 mg total) by mouth daily 30 tablet 2    fluticasone-salmeterol (Advair Diskus) 250-50 mcg/dose inhaler Inhale 1 puff 2 (two) times a day Rinse mouth after use   3 Inhaler 3    glucagon (GLUCAGON EMERGENCY) 1 MG injection Inject 1 mg under the skin once as needed for low blood sugar for up to 2 doses 1 kit 1    ibuprofen (MOTRIN) 600 mg tablet TAKE 1 TABLET BY MOUTH EVERY 6 HOURS AS NEEDED FOR MILD PAIN (PAIN SCORE 1-3)      insulin aspart (NovoLOG) 100 units/mL injection Use 30 units per day via pump Disp 1 vial per month (Patient taking differently: Use 100units per day via pump Disp 2 vial per month) 30 mL 1    Insulin Infusion Pump KIT 13 mm cannula, 23 inch tubing change site every 3 days      methocarbamol (ROBAXIN) 500 mg tablet Take 500 mg by mouth daily as needed for muscle spasms      methocarbamol (ROBAXIN) 500 mg tablet Take 500 mg by mouth 4 (four) times a day      Multiple Vitamin (DAILY VALUE MULTIVITAMIN) TABS Take by mouth daily       ondansetron (ZOFRAN-ODT) 4 mg disintegrating tablet Take 4 mg by mouth every 8 (eight) hours as needed      ONETOUCH DELICA LANCETS 59P MISC Patient test 6 times daily 600 each 1    ONETOUCH VERIO test strip Test six times a day 600 each 3    amitriptyline (ELAVIL) 10 mg tablet Take 0 5 tablets (5 mg total) by mouth daily at bedtime 30 tablet 1    amoxicillin-clavulanate (AUGMENTIN) 875-125 mg per tablet Take 1 tablet by mouth every 12 (twelve) hours for 7 days 14 tablet 0    clotrimazole-betamethasone (LOTRISONE) 1-0 05 % cream Apply topically as needed       EPINEPHrine (EPIPEN) 0 3 mg/0 3 mL SOAJ Inject 0 3 mL (0 3 mg total) into a muscle once for 1 dose 0 6 mL 0    Insulin Pen Needle (BD PEN NEEDLE NASIM U/F) 32G X 4 MM MISC by Does not apply route 4 (four) times a day for 180 days 400 each 3     No current facility-administered medications for this visit  Allergies   Allergen Reactions    Insulin Glargine      Burning and redness under skin    Iodides Throat Swelling and Swelling     Reaction Date: 28Jul2014; Action Taken: none; Category: Allergy; Reaction Date: 28Jul2014; Action Taken: none; Category: Allergy;     Iodine        Review of Systems   Constitutional: Negative for appetite change, chills, fatigue and fever  HENT: Positive for facial swelling (left eyelid), sinus pressure and sinus pain  Negative for congestion, ear pain, postnasal drip and rhinorrhea  Eyes: Positive for pain (pain with EOM 2/2 swelling)  Negative for discharge, redness and visual disturbance  Respiratory: Negative for cough, chest tightness, shortness of breath and wheezing  Cardiovascular: Negative for chest pain, palpitations and leg swelling  Gastrointestinal: Negative for diarrhea, nausea and vomiting  Genitourinary: Negative for dysuria  Musculoskeletal: Negative for myalgias  Neurological: Negative for dizziness and headaches  Hematological: Negative for adenopathy  Psychiatric/Behavioral: Negative for sleep disturbance  Video Exam    Vitals:    02/09/21 0911   Weight: 70 8 kg (156 lb)   Height: 5' 1" (1 549 m)       Physical Exam  Constitutional:       General: She is awake  She is not in acute distress  Appearance: Normal appearance  She is well-developed   She is not ill-appearing or diaphoretic  Eyes:      General:         Right eye: No discharge  Left eye: No discharge  Extraocular Movements:      Right eye: Normal extraocular motion  Left eye: Normal extraocular motion  Conjunctiva/sclera: Conjunctivae normal       Right eye: Right conjunctiva is not injected  No exudate  Left eye: Left conjunctiva is not injected  No exudate  Comments: Swelling noted of left upper eyelid  Exam limited on video platform but no redness, discharge, stye detected  Pt denies palpable lump in the lid   Pulmonary:      Effort: Pulmonary effort is normal  No respiratory distress  Skin:     General: Skin is dry  Neurological:      Mental Status: She is alert and oriented to person, place, and time  Psychiatric:         Attention and Perception: Attention normal          Mood and Affect: Mood normal          Speech: Speech normal          Behavior: Behavior normal  Behavior is cooperative  Thought Content: Thought content normal          Cognition and Memory: Cognition normal          Judgment: Judgment normal           I spent 20 minutes with patient today in which greater than 50% of the time was spent in counseling/coordination of care regarding symptoms, management, treatment, fu      VIRTUAL VISIT DISCLAIMER    Hay Barakat acknowledges that she has consented to an online visit or consultation  She understands that the online visit is based solely on information provided by her, and that, in the absence of a face-to-face physical evaluation by the physician, the diagnosis she receives is both limited and provisional in terms of accuracy and completeness  This is not intended to replace a full medical face-to-face evaluation by the physician  Hay Barakat understands and accepts these terms

## 2021-02-10 ENCOUNTER — TELEMEDICINE (OUTPATIENT)
Dept: BEHAVIORAL/MENTAL HEALTH CLINIC | Facility: CLINIC | Age: 26
End: 2021-02-10
Payer: COMMERCIAL

## 2021-02-10 DIAGNOSIS — F41.1 GENERALIZED ANXIETY DISORDER WITH PANIC ATTACKS: ICD-10-CM

## 2021-02-10 DIAGNOSIS — F42.2 MIXED OBSESSIONAL THOUGHTS AND ACTS: Primary | ICD-10-CM

## 2021-02-10 DIAGNOSIS — F31.63 BIPOLAR DISORDER, CURRENT EPISODE MIXED, SEVERE, WITHOUT PSYCHOTIC FEATURES (HCC): ICD-10-CM

## 2021-02-10 DIAGNOSIS — F43.12 CHRONIC POST-TRAUMATIC STRESS DISORDER (PTSD): ICD-10-CM

## 2021-02-10 DIAGNOSIS — F41.0 GENERALIZED ANXIETY DISORDER WITH PANIC ATTACKS: ICD-10-CM

## 2021-02-10 PROCEDURE — 90834 PSYTX W PT 45 MINUTES: CPT | Performed by: SOCIAL WORKER

## 2021-02-10 NOTE — PSYCH
This note was not shared with the patient due to this is a psychotherapy note    Virtual Regular Visit      Assessment/Plan:    Problem List Items Addressed This Visit        Other    Chronic post-traumatic stress disorder (PTSD)    Generalized anxiety disorder with panic attacks    OCD (obsessive compulsive disorder) - Primary    Bipolar affective disorder, current episode severe ([address] )               Reason for visit is No chief complaint on file  Encounter provider Bernard Paul    Provider located at [address] [place] [id number] [phone]      Recent Visits  Date Type Provider Dept   02/09/21 [facility], [address] Internal Med   Showing recent visits within past 7 days and meeting all other requirements     Today's Visits  Date Type Provider Dept   02/10/21 Telemedicine Bernard Paul [facility] Therapist Ivan   Showing today's visits and meeting all other requirements     Future Appointments  No visits were found meeting these conditions  Showing future appointments within next 150 days and meeting all other requirements        The patient was identified by name and date of birth  Rosaline Escamilla was informed that this is a telemedicine visit and that the visit is being conducted through [employer] and patient was informed that this is a secure, HIPAA-compliant platform  She agrees to proceed     My office door was closed  No one else was in the room  She acknowledged consent and understanding of privacy and security of the video platform  The patient has agreed to participate and understands they can discontinue the visit at any time  Patient is aware this is a billable service  Subjective  Rosaline Escamilla is a 27 y o  female    D: Met with Goldie Mccloud individually    Session focused upon choosing not to increase meds - wants to see if she can adapt to schoolwork without an increase  'I'm still overwhelmed with class work'  The classes she must attend virtually is a way to avoid procrastination  Neurologist is r/o demyelinating disease  Birthday last week 'depressing' - doesn't like birthday- reminds her of dying - one year closer to her death  Discussed a recent issue with dad where he was lecturing Gaye Marcano while she was over regarding 'family drama' and threats to sell the house and walk away from the family  Gaye Marcano stated she flashback to childhood 'rants from dad' and went upstairs to rest  Denied SI    A: Gaye Marcano presented with a more appropriate mood and affect  She is attending classes to the best of her ability - progress  P: Continue individual therapy      HPI     Past Medical History:   Diagnosis Date    Abdominal pain     Anxiety     Anxiety and depression     Depression     Diabetes ([facility] [address] )     type 1    Diabetes mellitus ([facility] [address] ) 1/17/2019    Eating disorder     history of anorexia/bulemia [id number]    Fracture of fibula     R Salter I    Hashimoto's disease 2/21/2020    Head injury     Nasal congestion     Obsessive-compulsive disorder     PTSD (post-traumatic stress disorder)     Rectal bleed     Seizures (HCC)        Past Surgical History:   Procedure Laterality Date    KNEE SURGERY Right 07/06/2020    NASAL SEPTOPLASTY W/ TURBINOPLASTY      SINUS SURGERY      WISDOM TOOTH EXTRACTION      WRIST SURGERY      left;  Excision of ganglion       Current Outpatient Medications   Medication Sig Dispense Refill    albuterol (PROVENTIL HFA,VENTOLIN HFA) 90 mcg/act inhaler Inhale 1 puff every 6 (six) hours as needed for wheezing or shortness of breath 1 Inhaler 5    Altavera 0 15-30 MG-MCG per tablet TAKE ONE TABLET BY MOUTH EVERY DAY 84 tablet 3    amitriptyline (ELAVIL) 10 mg tablet Take 0 5 tablets (5 mg total) by mouth daily at bedtime 30 tablet 1    amoxicillin-clavulanate (AUGMENTIN) 875-125 mg per tablet Take 1 tablet by mouth every 12 (twelve) hours for 7 days 14 tablet 0    ARIPiprazole (ABILIFY) 2 mg tablet TAKE 1 TABLET BY MOUTH DAILY 30 tablet 2    Blood Glucose Monitoring Suppl (ONETOUCH VERIO) w/Device KIT Use as directed  0    cholecalciferol (VITAMIN D3) 1,000 units tablet Take 5,000 Units by mouth daily       clonazePAM (KlonoPIN) 0 5 mg tablet Take 1 tablet (0 5 mg total) by mouth 2 (two) times a day 30 tablet 2    clotrimazole-betamethasone (LOTRISONE) 1-0 05 % cream Apply topically as needed       desvenlafaxine succinate (PRISTIQ) 50 mg 24 hr tablet TAKE 1 TABLET BY MOUTH DAILY 30 tablet 2    divalproex sodium (DEPAKOTE ER) 250 mg 24 hr tablet Take 1 tablet (250 mg total) by mouth daily at bedtime Total dose 750 at bedtime 30 tablet 2    divalproex sodium (DEPAKOTE ER) 500 mg 24 hr tablet Take 1 tablet (500 mg total) by mouth daily 30 tablet 2    EPINEPHrine (EPIPEN) 0 3 mg/0 3 mL SOAJ Inject 0 3 mL (0 3 mg total) into a muscle once for 1 dose 0 6 mL 0    fluticasone-salmeterol (Advair Diskus) 250-50 mcg/dose inhaler Inhale 1 puff 2 (two) times a day Rinse mouth after use   3 Inhaler 3    glucagon (GLUCAGON EMERGENCY) 1 MG injection Inject 1 mg under the skin once as needed for low blood sugar for up to 2 doses 1 kit 1    ibuprofen (MOTRIN) 600 mg tablet TAKE 1 TABLET BY MOUTH EVERY 6 HOURS AS NEEDED FOR MILD PAIN (PAIN SCORE 1-3)      insulin aspart (NovoLOG) 100 units/mL injection Use 30 units per day via pump Disp 1 vial per month (Patient taking differently: Use 100units per day via pump Disp 2 vial per month) 30 mL 1    Insulin Infusion Pump KIT 13 mm cannula, 23 inch tubing change site every 3 days      Insulin Pen Needle (BD PEN NEEDLE NASIM U/F) 32G X 4 MM MISC by Does not apply route 4 (four) times a day for 180 days 400 each 3    methocarbamol (ROBAXIN) 500 mg tablet Take 500 mg by mouth daily as needed for muscle spasms      methocarbamol (ROBAXIN) 500 mg tablet Take 500 mg by mouth 4 (four) times a day      Multiple Vitamin (DAILY VALUE MULTIVITAMIN) TABS Take by mouth daily       ondansetron (ZOFRAN-ODT) 4 mg disintegrating tablet Take 4 mg by mouth every 8 (eight) hours as needed      ONETOUCH DELICA LANCETS 38K MISC Patient test 6 times daily 600 each 1    ONETOUCH VERIO test strip Test six times a day 600 each 3     No current facility-administered medications for this visit  Allergies   Allergen Reactions    Insulin Glargine      Burning and redness under skin    Iodides Throat Swelling and Swelling     Reaction Date: 28Jul2014; Action Taken: none; Category: Allergy; Reaction Date: 28Jul2014; Action Taken: none; Category: Allergy;     Iodine            I spent 50 minutes directly with the patient during this visit      VIRTUAL VISIT DISCLAIMER    Katarzyna Conn acknowledges that she has consented to an online visit or consultation  She understands that the online visit is based solely on information provided by her, and that, in the absence of a face-to-face physical evaluation by the physician, the diagnosis she receives is both limited and provisional in terms of accuracy and completeness  This is not intended to replace a full medical face-to-face evaluation by the physician  Katarzyna Conn understands and accepts these terms

## 2021-02-11 ENCOUNTER — TELEPHONE (OUTPATIENT)
Dept: NEUROLOGY | Facility: CLINIC | Age: 26
End: 2021-02-11

## 2021-02-11 ENCOUNTER — CONSULT (OUTPATIENT)
Dept: NEUROLOGY | Facility: CLINIC | Age: 26
End: 2021-02-11
Payer: COMMERCIAL

## 2021-02-11 VITALS
WEIGHT: 158.9 LBS | SYSTOLIC BLOOD PRESSURE: 130 MMHG | HEIGHT: 61 IN | HEART RATE: 100 BPM | DIASTOLIC BLOOD PRESSURE: 100 MMHG | RESPIRATION RATE: 14 BRPM | BODY MASS INDEX: 30 KG/M2

## 2021-02-11 DIAGNOSIS — R53.1 WEAKNESS GENERALIZED: Primary | ICD-10-CM

## 2021-02-11 DIAGNOSIS — R53.82 CHRONIC FATIGUE AND MALAISE: ICD-10-CM

## 2021-02-11 DIAGNOSIS — E06.3 HASHIMOTO'S DISEASE: ICD-10-CM

## 2021-02-11 DIAGNOSIS — F41.1 GENERALIZED ANXIETY DISORDER WITH PANIC ATTACKS: ICD-10-CM

## 2021-02-11 DIAGNOSIS — F41.0 GENERALIZED ANXIETY DISORDER WITH PANIC ATTACKS: ICD-10-CM

## 2021-02-11 DIAGNOSIS — E53.8 LOW SERUM VITAMIN B12: ICD-10-CM

## 2021-02-11 DIAGNOSIS — M25.50 ARTHRALGIA, UNSPECIFIED JOINT: ICD-10-CM

## 2021-02-11 DIAGNOSIS — E10.9 TYPE 1 DIABETES MELLITUS WITHOUT COMPLICATION (HCC): ICD-10-CM

## 2021-02-11 DIAGNOSIS — R53.81 CHRONIC FATIGUE AND MALAISE: ICD-10-CM

## 2021-02-11 DIAGNOSIS — H53.9 VISUAL CHANGES: ICD-10-CM

## 2021-02-11 DIAGNOSIS — R30.0 DYSURIA: ICD-10-CM

## 2021-02-11 PROCEDURE — 99215 OFFICE O/P EST HI 40 MIN: CPT | Performed by: PSYCHIATRY & NEUROLOGY

## 2021-02-11 NOTE — PROGRESS NOTES
[facility]LE [facility]  PATIENT:  Bill Oh  MRN:  [id number]  :  1995  DATE OF SERVICE:  2021    Assessment/Plan:           Problem List Items Addressed This Visit        Endocrine    Type 1 diabetes mellitus without complication (HCC)    Hashimoto's disease       Other    Chronic fatigue and malaise    Generalized anxiety disorder with panic attacks    Visual changes    Dysuria    Weakness generalized - Primary    Relevant Orders    SOHAM Screen w/ Reflex to Titer/Pattern    dsDNA Antibody by IFA, Crithidia luciliae, with Reflex to Titer    Sjogren's Antibodies    ANCA Screen With MPO and PR3 With Reflex To ANCA Titer    Acetylcholine receptor, binding    Acetylcholine receptor, blocking    Acetylcholine receptor, modulating    Striated muscle antibody    Voltage Gated Calcium Channel (VGCC) Ab Assay    Rheumatoid Factor (IgA, IgG, IgM)    Low serum vitamin B12    Relevant Orders    Intrinsic factor blocking antibody    Arthralgia          Ms Rodrigues  Has been sent to [address] [place] [address] [address] for evaluation  Patient had known Hashimoto's thyroiditis with time 1 diabetes for last 2 years with  Memory challenges and learning difficulties reported  Patient had intermittent functional stuttering during this visit, non concerning  We personally reviewed brain imaging, we agreed patient has periventricular demyelination  Occipital region,  With no other demyelinating plaques has been appreciated and her imaging has been stable since 2016  we extensively discussed nature of autoimmune disorders with her imaging findings can be seen with Hashimoto encephalopathy; At the same time we agreed patient will benefit from additional serologic workup including several rheumatological conditions as well as myasthenia gravis and evaluation for pernicious anemia      Patient will have imaging studies of the cervical region, she has slightly brisk reflexes in her lower extremities, likely due to low normal B12 and potentially copper deficiency as patient has been taking Zinc for more than 6 months  Patient ambulates without assistive devices  Patient described no other events concerning for seizure-like activity, patient would not require any additional testing at the epilepsy unit at this point  Patient is to reach Dr Breanna Segura  back if she has any questions  Patient may benefit from formal neuropsychological evaluation, considering she has learning difficulties  Patient is to continue having schedule sessions with Psychiatry team as initially advised  Follow-up with [facility] Neurology within 6-8 weeks      Subjective:patiet is to rule out MS    HPI  Mrs Rodrigues is a 28 yo female who was referred to [address] [place] [address] for evaluation  Initial evaluation was provided by Dr Breanna Segura for concussion/syncope/muscle weakness and neuropathy  Jessien had reported numbness since 2016, with EMG/NCS completed with no pathology reported in bilateral lower extremities  Patient had concussion in 2016, with difficulties at school started at that time  Thyroid dysfunction has been reported and concerning as her TSH is up and down   Patient reports no vision loss, but difficulties with memory described; weakness has been reported in right upper extremity and bilateral lower extremities; Left hand weakness has been ongoing for some time now and started after ganglion cyst removal   Functional stutter has been noted on and off through this encounter  Black spots in her peripheral vision; Patient reported left-sided facial weakness, no residual dysfunction has been noted today  Patient will be completing her college wit one semester left; Patient has learning difficulties; Memory complaints has been reported  Patient started evaluation for seizure like activities, no further evaluation required at [facility]      The following portions of the patient's history were reviewed and updated as appropriate:   She  has a past medical history of Abdominal pain, Anxiety, Anxiety and depression, Depression, Diabetes ([facility] [address] ), Diabetes mellitus ([facility] [address] ) (1/17/2019), Eating disorder, Fracture of fibula, Hashimoto's disease (2/21/2020), Head injury, Nasal congestion, Obsessive-compulsive disorder, PTSD (post-traumatic stress disorder), Rectal bleed, and Seizures ([facility] [address] )    She   Patient Active Problem List    Diagnosis Date Noted    Weakness generalized 02/11/2021    Low serum vitamin B12 02/11/2021    Arthralgia 02/11/2021    Acute non-recurrent sinusitis 02/09/2021    Bipolar affective disorder, current episode severe ([facility] [address] ) 01/13/2021    Cough 12/31/2020    COVID-19 12/31/2020    Vaginal discharge 12/01/2020    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    Yeast infection 12/01/2020    Vulvar cellulitis 11/04/2020    Vulvovaginitis 11/03/2020    Syncope     Chronic back pain 10/16/2020    Insulin pump status 09/30/2020    Muscle weakness 09/03/2020    Word finding difficulty 09/03/2020    Neuropathy 06/23/2020    Polyarthritis 06/23/2020    Secondary amenorrhea 06/23/2020    Vitamin D deficiency 06/19/2020    Wheezing 04/21/2020    Paresthesia of left leg 04/21/2020    Hashimoto's disease 02/21/2020    Dysuria 02/21/2020    Flank pain 02/21/2020    Amenorrhea 02/21/2020    Visual changes 01/21/2020    Chronic pain of right knee 10/15/2019    Chronic abdominal pain 10/15/2019    Blood in the stool 10/15/2019    OCD (obsessive compulsive disorder) 09/26/2019    Activity intolerance 09/10/2019    Chest pain 09/10/2019    Chronic post-traumatic stress disorder (PTSD) 08/12/2019    Generalized anxiety disorder with panic attacks 08/12/2019    Nausea 08/06/2019    Agitation 08/06/2019    Concussion without loss of consciousness 07/02/2019    Urinary frequency 06/06/2019    Hypoglycemia 06/05/2019    Abnormal stools 02/07/2019    Type 1 diabetes mellitus with hyperglycemia ([facility] [address] ) 01/29/2019    Hypokalemia 01/25/2019    Type 1 diabetes mellitus without complication (HCC) 18/35/6723    Abnormal liver function test 12/13/2018    Chronic RUQ pain 12/13/2018    Chronic fatigue and malaise 12/13/2018    Patellofemoral pain syndrome of right knee 09/26/2018     She  has a past surgical history that includes Wrist surgery; [place] tooth extraction; Nasal septoplasty w/ turbinoplasty; Knee surgery (Right, 07/06/2020); and Sinus surgery  Her family history includes Alcohol abuse in her brother and father; Arthritis in her mother; Cancer in her family; Cholelithiasis in her father; Cirrhosis in her father; Coronary artery disease in her father; Depression in her mother; Diabetes in her family, father, and mother; Diabetes type II in her mother; Hyperlipidemia in her father and mother; Hypertension in her family, father, and mother; Migraines in her mother; Nephrolithiasis in her father; Sarcoidosis in her father; Thyroid disease in her sister; Varicose Veins in her mother  She  reports that she has never smoked  She has never used smokeless tobacco  She reports previous alcohol use  She reports that she does not use drugs    Current Outpatient Medications   Medication Sig Dispense Refill    albuterol (PROVENTIL HFA,VENTOLIN HFA) 90 mcg/act inhaler Inhale 1 puff every 6 (six) hours as needed for wheezing or shortness of breath 1 Inhaler 5    Altavera 0 15-30 MG-MCG per tablet TAKE ONE TABLET BY MOUTH EVERY DAY 84 tablet 3    amitriptyline (ELAVIL) 10 mg tablet Take 0 5 tablets (5 mg total) by mouth daily at bedtime 30 tablet 1    amoxicillin-clavulanate (AUGMENTIN) 875-125 mg per tablet Take 1 tablet by mouth every 12 (twelve) hours for 7 days 14 tablet 0    ARIPiprazole (ABILIFY) 2 mg tablet TAKE 1 TABLET BY MOUTH DAILY 30 tablet 2    Blood Glucose Monitoring Suppl (ONETOUCH VERIO) w/Device KIT Use as directed  0    cholecalciferol (VITAMIN D3) 1,000 units tablet Take 5,000 Units by mouth daily       clonazePAM (KlonoPIN) 0 5 mg tablet Take 1 tablet (0 5 mg total) by mouth 2 (two) times a day 30 tablet 2    desvenlafaxine succinate (PRISTIQ) 50 mg 24 hr tablet TAKE 1 TABLET BY MOUTH DAILY 30 tablet 2    divalproex sodium (DEPAKOTE ER) 250 mg 24 hr tablet Take 1 tablet (250 mg total) by mouth daily at bedtime Total dose 750 at bedtime 30 tablet 2    divalproex sodium (DEPAKOTE ER) 500 mg 24 hr tablet Take 1 tablet (500 mg total) by mouth daily 30 tablet 2    EPINEPHrine (EPIPEN) 0 3 mg/0 3 mL SOAJ Inject 0 3 mL (0 3 mg total) into a muscle once for 1 dose 0 6 mL 0    glucagon (GLUCAGON EMERGENCY) 1 MG injection Inject 1 mg under the skin once as needed for low blood sugar for up to 2 doses 1 kit 1    ibuprofen (MOTRIN) 600 mg tablet TAKE 1 TABLET BY MOUTH EVERY 6 HOURS AS NEEDED FOR MILD PAIN (PAIN SCORE 1-3)      insulin aspart (NovoLOG) 100 units/mL injection Use 30 units per day via pump Disp 1 vial per month (Patient taking differently: Use 100units per day via pump Disp 2 vial per month) 30 mL 1    Insulin Infusion Pump KIT 13 mm cannula, 23 inch tubing change site every 3 days      Insulin Pen Needle (BD PEN NEEDLE NASIM U/F) 32G X 4 MM MISC by Does not apply route 4 (four) times a day for 180 days 400 each 3    Multiple Vitamin (DAILY VALUE MULTIVITAMIN) TABS Take by mouth daily       ONETOUCH DELICA LANCETS 20N MISC Patient test 6 times daily 600 each 1    ONETOUCH VERIO test strip Test six times a day 600 each 3    clotrimazole-betamethasone (LOTRISONE) 1-0 05 % cream Apply topically as needed       fluticasone-salmeterol (Advair Diskus) 250-50 mcg/dose inhaler Inhale 1 puff 2 (two) times a day Rinse mouth after use   (Patient not taking: Reported on 2/11/2021) 3 Inhaler 3    methocarbamol (ROBAXIN) 500 mg tablet Take 500 mg by mouth daily as needed for muscle spasms      methocarbamol (ROBAXIN) 500 mg tablet Take 500 mg by mouth 4 (four) times a day      ondansetron (ZOFRAN-ODT) 4 mg disintegrating tablet Take 4 mg by mouth every 8 (eight) hours as needed       No current facility-administered medications for this visit        Current Outpatient Medications on File Prior to Visit   Medication Sig    albuterol (PROVENTIL HFA,VENTOLIN HFA) 90 mcg/act inhaler Inhale 1 puff every 6 (six) hours as needed for wheezing or shortness of breath    Altavera 0 15-30 MG-MCG per tablet TAKE ONE TABLET BY MOUTH EVERY DAY    amitriptyline (ELAVIL) 10 mg tablet Take 0 5 tablets (5 mg total) by mouth daily at bedtime    amoxicillin-clavulanate (AUGMENTIN) 875-125 mg per tablet Take 1 tablet by mouth every 12 (twelve) hours for 7 days    ARIPiprazole (ABILIFY) 2 mg tablet TAKE 1 TABLET BY MOUTH DAILY    Blood Glucose Monitoring Suppl (ONETOUCH VERIO) w/Device KIT Use as directed    cholecalciferol (VITAMIN D3) 1,000 units tablet Take 5,000 Units by mouth daily     clonazePAM (KlonoPIN) 0 5 mg tablet Take 1 tablet (0 5 mg total) by mouth 2 (two) times a day    desvenlafaxine succinate (PRISTIQ) 50 mg 24 hr tablet TAKE 1 TABLET BY MOUTH DAILY    divalproex sodium (DEPAKOTE ER) 250 mg 24 hr tablet Take 1 tablet (250 mg total) by mouth daily at bedtime Total dose 750 at bedtime    divalproex sodium (DEPAKOTE ER) 500 mg 24 hr tablet Take 1 tablet (500 mg total) by mouth daily    EPINEPHrine (EPIPEN) 0 3 mg/0 3 mL SOAJ Inject 0 3 mL (0 3 mg total) into a muscle once for 1 dose    glucagon (GLUCAGON EMERGENCY) 1 MG injection Inject 1 mg under the skin once as needed for low blood sugar for up to 2 doses    ibuprofen (MOTRIN) 600 mg tablet TAKE 1 TABLET BY MOUTH EVERY 6 HOURS AS NEEDED FOR MILD PAIN (PAIN SCORE 1-3)    insulin aspart (NovoLOG) 100 units/mL injection Use 30 units per day via pump Disp 1 vial per month (Patient taking differently: Use 100units per day via pump Disp 2 vial per month)    Insulin Infusion Pump KIT 13 mm cannula, 23 inch tubing change site every 3 days    Insulin Pen Needle (BD PEN NEEDLE NASIM U/F) 32G X 4 MM MISC by Does not apply route 4 (four) times a day for 180 days    Multiple Vitamin (DAILY VALUE MULTIVITAMIN) TABS Take by mouth daily     ONETOUCH DELICA LANCETS 65G MISC Patient test 6 times daily    ONETOUCH VERIO test strip Test six times a day    clotrimazole-betamethasone (LOTRISONE) 1-0 05 % cream Apply topically as needed     fluticasone-salmeterol (Advair Diskus) 250-50 mcg/dose inhaler Inhale 1 puff 2 (two) times a day Rinse mouth after use  (Patient not taking: Reported on 2/11/2021)    methocarbamol (ROBAXIN) 500 mg tablet Take 500 mg by mouth daily as needed for muscle spasms    methocarbamol (ROBAXIN) 500 mg tablet Take 500 mg by mouth 4 (four) times a day    ondansetron (ZOFRAN-ODT) 4 mg disintegrating tablet Take 4 mg by mouth every 8 (eight) hours as needed     No current facility-administered medications on file prior to visit  She is allergic to insulin glargine; iodides; and iodine            Objective:    Blood pressure 130/100, pulse 100, resp  rate 14, height 5' 1" (1 549 m), weight 72 1 kg (158 lb 14 4 oz), not currently breastfeeding  Physical Exam    Neurological Exam  CONSTITUTIONAL: NAD, pleasant  NECK: supple, no lymphadenopathy, no thyromegaly, no JVD  CARDIOVASCULAR: RRR, normal S1S2, no murmurs, no rubs  RESP: clear to auscultation bilaterally, no wheezes/rhonchi/rales  ABDOMEN: soft, non tender, non distended  SKIN: no rash or skin lesions  EXTREMITIES: no edema, pulses 2+bilaterally  PSYCH: appropriate mood and affect  NEUROLOGIC COMPREHENSIVE EXAM: Patient is oriented to person, place and time, NAD; appropriate affect  CN II, III, IV, V, VI, VII,VIII,IX,X,XI-XII intact with EOMI, PERRLA, OKN intact, VF grossly intact, fundi poorly visualized secondary to pupillary constriction; symmetric face noted   Motor: patient has diffuse weakness with give away strength noted on exam; Sensory: intact to light touch and pinprick bilaterally; normal vibration sensation feet bilaterally; Coordination within normal limits on FTN and [place] testing; DTR: 2/4 through, with 2+/4 in patella bilaterally,  no Babinski, no clonus  Tandem gait is mildly abnormal  Romberg: negative  ROS:  12 points of review of system was reviewed with the patient and was unremarkable with exception: see HPI  Review of Systems   Constitutional: Positive for fatigue  Negative for appetite change and fever  HENT: Negative  Negative for hearing loss, tinnitus, trouble swallowing and voice change  Eyes: Negative  Negative for photophobia and pain  Respiratory: Negative  Negative for shortness of breath  Cardiovascular: Negative  Negative for palpitations  Gastrointestinal: Negative  Negative for nausea and vomiting  Endocrine: Negative  Negative for cold intolerance  Genitourinary: Negative  Negative for dysuria, frequency and urgency  Musculoskeletal: Negative for myalgias  Joint swelling: joint pain  Skin: Negative  Negative for rash  Neurological: Positive for speech difficulty, weakness (heaviness) and numbness (tingling, bunring sensation, tightness)  Negative for dizziness, tremors, seizures, syncope, facial asymmetry, light-headedness and headaches  Memory issues   Hematological: Negative  Does not bruise/bleed easily  Psychiatric/Behavioral: Positive for sleep disturbance  Negative for confusion and hallucinations

## 2021-02-11 NOTE — TELEPHONE ENCOUNTER
Received a call from [place] with [facility], regarding voltage gated calcium channel  Questioning if Dr Jerome Lockett the Type N or Type P & Q or both  Dr Bangura Masters requested both  Jack Acosta made aware, she stated she will get both

## 2021-02-12 ENCOUNTER — HOSPITAL ENCOUNTER (OUTPATIENT)
Dept: RADIOLOGY | Facility: HOSPITAL | Age: 26
Discharge: HOME | End: 2021-02-12
Attending: STUDENT IN AN ORGANIZED HEALTH CARE EDUCATION/TRAINING PROGRAM
Payer: COMMERCIAL

## 2021-02-12 DIAGNOSIS — R41.3 MEMORY DIFFICULTIES: ICD-10-CM

## 2021-02-12 DIAGNOSIS — R51.9 HEADACHE: ICD-10-CM

## 2021-02-12 LAB
ALBUMIN SERPL-MCNC: 4.2 G/DL (ref 3.6–5.1)
ALBUMIN/GLOB SERPL: 1.4 (CALC) (ref 1–2.5)
ALP SERPL-CCNC: 64 U/L (ref 31–125)
ALT SERPL-CCNC: 13 U/L (ref 6–29)
AST SERPL-CCNC: 17 U/L (ref 10–30)
BILIRUB DIRECT SERPL-MCNC: 0.1 MG/DL
BILIRUB INDIRECT SERPL-MCNC: 0.6 MG/DL (CALC) (ref 0.2–1.2)
BILIRUB SERPL-MCNC: 0.7 MG/DL (ref 0.2–1.2)
CRP SERPL-MCNC: 0.9 MG/L
ERYTHROCYTE [SEDIMENTATION RATE] IN BLOOD BY WESTERGREN METHOD: 6 MM/H
GLOBULIN SER CALC-MCNC: 2.9 G/DL (CALC) (ref 1.9–3.7)
PROT SERPL-MCNC: 7.1 G/DL (ref 6.1–8.1)

## 2021-02-12 PROCEDURE — 72156 MRI NECK SPINE W/O & W/DYE: CPT

## 2021-02-12 PROCEDURE — G1004 CDSM NDSC: HCPCS

## 2021-02-12 PROCEDURE — A9585 GADOBUTROL INJECTION: HCPCS | Performed by: STUDENT IN AN ORGANIZED HEALTH CARE EDUCATION/TRAINING PROGRAM

## 2021-02-12 RX ADMIN — GADOBUTROL 7 ML: 604.72 INJECTION INTRAVENOUS at 18:49

## 2021-02-17 ENCOUNTER — TELEMEDICINE (OUTPATIENT)
Dept: BEHAVIORAL/MENTAL HEALTH CLINIC | Facility: CLINIC | Age: 26
End: 2021-02-17
Payer: COMMERCIAL

## 2021-02-17 DIAGNOSIS — F42.2 MIXED OBSESSIONAL THOUGHTS AND ACTS: Primary | ICD-10-CM

## 2021-02-17 DIAGNOSIS — F43.12 CHRONIC POST-TRAUMATIC STRESS DISORDER (PTSD): ICD-10-CM

## 2021-02-17 DIAGNOSIS — F31.63 BIPOLAR DISORDER, CURRENT EPISODE MIXED, SEVERE, WITHOUT PSYCHOTIC FEATURES (HCC): ICD-10-CM

## 2021-02-17 DIAGNOSIS — F41.1 GENERALIZED ANXIETY DISORDER WITH PANIC ATTACKS: ICD-10-CM

## 2021-02-17 DIAGNOSIS — F41.0 GENERALIZED ANXIETY DISORDER WITH PANIC ATTACKS: ICD-10-CM

## 2021-02-17 PROCEDURE — 90834 PSYTX W PT 45 MINUTES: CPT | Performed by: SOCIAL WORKER
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
## 2021-02-20 LAB
ACHR BIND AB SER-SCNC: <0.3 NMOL/L
ACHR BLOCK AB SER-ACNC: <15 % INHIBITION
ACHR MOD AB SER-ACNC: 16 % INHIBITION
ANA PAT SER IF-IMP: ABNORMAL
ANA PAT SER-IMP: ABNORMAL
ANA SER QL IF: POSITIVE
ANA TITR SER IF: ABNORMAL TITER
ANA TITR SER IF: ABNORMAL TITER
ANCA AB SER QL: NEGATIVE
DSDNA AB SER QL CLIF: NEGATIVE
ENA SS-A AB SER IA-ACNC: NORMAL AI
ENA SS-B AB SER IA-ACNC: NORMAL AI
IF BLOCK AB SER QL: NEGATIVE
MYELOPEROXIDASE AB SER IA-ACNC: <1 AI
PROTEINASE3 AB SER IA-ACNC: <1 AI
RF IGA SER-ACNC: <5 U
RF IGG SER-ACNC: 10 U
RF IGM SER-ACNC: <5 U
STRIA MUS AB SER QL: NEGATIVE
VGCC AB SER-SCNC: <30 PMOL/L
VGCC-N BIND AB SER-SCNC: <54 PMOL/L

## 2021-02-26 ENCOUNTER — OFFICE VISIT (OUTPATIENT)
Dept: OBGYN CLINIC | Facility: CLINIC | Age: 26
End: 2021-02-26
Payer: COMMERCIAL

## 2021-02-26 VITALS — WEIGHT: 160.4 LBS | BODY MASS INDEX: 30.31 KG/M2 | DIASTOLIC BLOOD PRESSURE: 74 MMHG | SYSTOLIC BLOOD PRESSURE: 102 MMHG

## 2021-02-26 DIAGNOSIS — N94.819 VULVODYNIA: ICD-10-CM

## 2021-02-26 PROCEDURE — 99213 OFFICE O/P EST LOW 20 MIN: CPT | Performed by: OBSTETRICS & GYNECOLOGY

## 2021-02-26 RX ORDER — AMITRIPTYLINE HYDROCHLORIDE 10 MG/1
10 TABLET, FILM COATED ORAL
Qty: 30 TABLET | Refills: 2 | Status: SHIPPED | OUTPATIENT
Start: 2021-02-26 | End: 2021-07-06

## 2021-02-26 NOTE — PROGRESS NOTES
GYN Follow Up Visit    HPI: Patient is here for f/u after starting elavil 5mg daily  Tried IC once last week; thinks she can slowly start being more active  Currently getting evaluated for MS  PMH, PSH, Meds, Allergies, SocHx, FamHx reviewed and no changes noted  Review of Systems   Constitution: Positive for weight gain  Negative for chills, decreased appetite and fever  Respiratory: Negative for cough, shortness of breath, sputum production and wheezing  Gastrointestinal: Negative for bloating, abdominal pain, nausea and vomiting  Genitourinary: Positive for pelvic pain  Negative for bladder incontinence, dysuria and frequency  Vitals:    02/26/21 1511   BP: 102/74       Physical Exam  Constitutional:       Appearance: Normal appearance  Genitourinary:      Vulva and urethra normal       No vulval lesion or tenderness noted  HENT:      Head: Normocephalic  Cardiovascular:      Rate and Rhythm: Normal rate and regular rhythm  Pulmonary:      Effort: Pulmonary effort is normal    Abdominal:      General: There is no distension  Palpations: Abdomen is soft  Tenderness: There is no abdominal tenderness  Musculoskeletal:         General: No swelling  Neurological:      General: No focal deficit present  Mental Status: She is alert and oriented to person, place, and time  Skin:     General: Skin is warm and dry  Psychiatric:         Mood and Affect: Mood normal          Behavior: Behavior normal    Vitals signs reviewed  Impression:  Improving vulvodynia    Plan:   Will increase Elavil to 10mg daily  Discussed contraception and depression  RTO 4 weeks

## 2021-03-02 ENCOUNTER — TELEPHONE (OUTPATIENT)
Dept: NEUROLOGY | Facility: CLINIC | Age: 26
End: 2021-03-02

## 2021-03-02 NOTE — TELEPHONE ENCOUNTER
Pt left voicemail for a call back regarding her results and symptoms  Called pt, she would like to know what the abnormal results mean for her  Abnormal results include rheumatoid factor, SOHAM screen, and SOHAM titer  Please advise  Pt also reports she started falling a lot  States she did occasionally fall before but this has become much worse in the last 2-3 weeks  Reports it feels as though her legs "quit" on her  States her legs feel very heavy "like cinder blocks" at times and will crumple under her  This is especially worse in the right leg  Denies injury  States she does have bruises on legs and knees from the falls  States that she is unable to walk for about 36 minutes after falling as her legs feel too weak and as though they cannot hold her up  No other complaints or changes to symptoms from last visit       [phone]  Okay to leave detailed message

## 2021-03-02 NOTE — TELEPHONE ENCOUNTER
Case reviewed - during evaluation by MS team - multiple sclerosis was ruled out with no demyelination in her brain, cervical and upper thoracic spine appreciated  We also ruled out another neurologic condition - myasthenia gravis and Lambert-eaton syndrome;  Peripheral nerve evaluation with EMG/NCS completed in October 2020, with no peripheral damage of her nerve appreciated as well, no signs of myopathy, based on her report  Patient is to follow with primary care team and request referral to be further evaluated by rheumatology as she was noted positive SOHAM and Rheumatoid factor  Please confirm patient has access to our office visit note with detailed discussion and my impression about her clinical presentation

## 2021-03-03 ENCOUNTER — SOCIAL WORK (OUTPATIENT)
Dept: BEHAVIORAL/MENTAL HEALTH CLINIC | Facility: CLINIC | Age: 26
End: 2021-03-03
Payer: COMMERCIAL

## 2021-03-03 DIAGNOSIS — F42.2 MIXED OBSESSIONAL THOUGHTS AND ACTS: Primary | ICD-10-CM

## 2021-03-03 DIAGNOSIS — F41.0 GENERALIZED ANXIETY DISORDER WITH PANIC ATTACKS: ICD-10-CM

## 2021-03-03 DIAGNOSIS — F31.63 BIPOLAR DISORDER, CURRENT EPISODE MIXED, SEVERE, WITHOUT PSYCHOTIC FEATURES (HCC): ICD-10-CM

## 2021-03-03 DIAGNOSIS — F41.1 GENERALIZED ANXIETY DISORDER WITH PANIC ATTACKS: ICD-10-CM

## 2021-03-03 DIAGNOSIS — F43.12 CHRONIC POST-TRAUMATIC STRESS DISORDER (PTSD): ICD-10-CM

## 2021-03-03 PROCEDURE — 90834 PSYTX W PT 45 MINUTES: CPT | Performed by: SOCIAL WORKER
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
## 2021-03-09 ENCOUNTER — OFFICE VISIT (OUTPATIENT)
Dept: INTERNAL MEDICINE CLINIC | Facility: CLINIC | Age: 26
End: 2021-03-09
Payer: COMMERCIAL

## 2021-03-09 VITALS
BODY MASS INDEX: 29.98 KG/M2 | OXYGEN SATURATION: 98 % | SYSTOLIC BLOOD PRESSURE: 112 MMHG | DIASTOLIC BLOOD PRESSURE: 72 MMHG | HEART RATE: 86 BPM | RESPIRATION RATE: 16 BRPM | WEIGHT: 158.8 LBS | TEMPERATURE: 98.7 F | HEIGHT: 61 IN

## 2021-03-09 DIAGNOSIS — E06.3 HASHIMOTO'S DISEASE: Primary | ICD-10-CM

## 2021-03-09 DIAGNOSIS — Z13.6 SCREENING FOR HEART DISEASE: ICD-10-CM

## 2021-03-09 DIAGNOSIS — M62.81 MUSCLE WEAKNESS: ICD-10-CM

## 2021-03-09 DIAGNOSIS — R76.8 POSITIVE ANA (ANTINUCLEAR ANTIBODY): ICD-10-CM

## 2021-03-09 DIAGNOSIS — F31.63 BIPOLAR DISORDER, CURRENT EPISODE MIXED, SEVERE, WITHOUT PSYCHOTIC FEATURES (HCC): ICD-10-CM

## 2021-03-09 DIAGNOSIS — E10.65 TYPE 1 DIABETES MELLITUS WITH HYPERGLYCEMIA (HCC): ICD-10-CM

## 2021-03-09 DIAGNOSIS — M13.0 POLYARTHRITIS: ICD-10-CM

## 2021-03-09 PROCEDURE — 99214 OFFICE O/P EST MOD 30 MIN: CPT | Performed by: NURSE PRACTITIONER

## 2021-03-09 PROCEDURE — 3725F SCREEN DEPRESSION PERFORMED: CPT | Performed by: NURSE PRACTITIONER

## 2021-03-09 PROCEDURE — 1036F TOBACCO NON-USER: CPT | Performed by: NURSE PRACTITIONER

## 2021-03-09 PROCEDURE — 3008F BODY MASS INDEX DOCD: CPT | Performed by: NURSE PRACTITIONER

## 2021-03-09 NOTE — ASSESSMENT & PLAN NOTE
Pt continues with generalized joint pains and previously negative RF, inconclusive lab work up  Recently had been evaluated by neuro to work up for MS  Labs done as part of work up, RF IgG positive, SOHAM 1:80 with speckled type  She was referred to rheumatology for evaluation, schedule in June  Will try to get her in sooner with a different doctor

## 2021-03-09 NOTE — ASSESSMENT & PLAN NOTE
Managed by endocrinology at   Now with insulin pump  Compliant with diabetic diet but continues with high sugars and high insulin demand though control has been much better than previously  Continue with treatment and f/u with endo    Lab Results   Component Value Date    HGBA1C 6 8 (H) 09/14/2020

## 2021-03-09 NOTE — ASSESSMENT & PLAN NOTE
Muscle weakness, generalized joint pains, fatigue, positive SOHAM, RF  Neuro evaluation not consistent with MS  They want her to see rheumatology and she is scheduled in June  Will attempt to get her in sooner in a different office

## 2021-03-09 NOTE — ASSESSMENT & PLAN NOTE
Pos SOHAM speckled 1:80 with sx of muscle weakness, joint pain, malar type rash reported by pt and myriad systemic complaints as noted in HPI  Will be seen by rheumatology  She is scheduled in June but will attempt to get her scheduled sooner, referral to Dr Ara German

## 2021-03-09 NOTE — ASSESSMENT & PLAN NOTE
Pt has many sx of hypothyroidism including weight gain, dry skin, brittle hair, fatigue, weakness, irregular menses, etc   Will recheck the tsh and t4 but need to reach out to her endocrinologist for more information and to discuss how to further evaluate her symptoms

## 2021-03-09 NOTE — PROGRESS NOTES
Assessment/Plan:    Type 1 diabetes mellitus with hyperglycemia ([address] )  Managed by endocrinology at   Now with insulin pump  Compliant with diabetic diet but continues with high sugars and high insulin demand though control has been much better than previously  Continue with treatment and f/u with endo  Lab Results   Component Value Date    HGBA1C 6 8 (H) 09/14/2020       Hashimoto's disease  Pt has many sx of hypothyroidism including weight gain, dry skin, brittle hair, fatigue, weakness, irregular menses, etc   Will recheck the tsh and t4 but need to reach out to her endocrinologist for more information and to discuss how to further evaluate her symptoms  Polyarthritis  Pt continues with generalized joint pains and previously negative RF, inconclusive lab work up  Recently had been evaluated by neuro to work up for MS  Labs done as part of work up, RF IgG positive, SOHAM 1:80 with speckled type  She was referred to rheumatology for evaluation, schedule in June  Will try to get her in sooner with a different doctor  Muscle weakness  Muscle weakness, generalized joint pains, fatigue, positive SOHAM, RF  Neuro evaluation not consistent with MS  They want her to see rheumatology and she is scheduled in June  Will attempt to get her in sooner in a different office  Bipolar affective disorder, current episode severe [facility])  Working with psych, now on depakote, abilify, elavil  Continue treatment and fu as advised  Positive SOHAM (antinuclear antibody)  Pos SOHAM speckled 1:80 with sx of muscle weakness, joint pain, malar type rash reported by pt and myriad systemic complaints as noted in HPI  Will be seen by rheumatology  She is scheduled in June but will attempt to get her scheduled sooner, referral to Dr Heart daniela  Diagnoses and all orders for this visit:    Hashimoto's disease  -     T4, free; Future  -     TSH, 3rd generation; Future  -     Lipid panel;  Future  -     Cancel: Ambulatory referral to Rheumatology; Future  -     Ambulatory referral to Rheumatology; Future    Muscle weakness  -     CBC and differential; Future    Screening for heart disease  -     Lipid panel; Future    Positive SOHAM (antinuclear antibody)  -     Cancel: Ambulatory referral to Rheumatology; Future  -     Ambulatory referral to Rheumatology; Future    Type 1 diabetes mellitus with hyperglycemia (HCC)    Polyarthritis    Bipolar disorder, current episode mixed, severe, without psychotic features (HCC)          Subjective:      Patient ID: Naldo Fischer is a 27 y o  female  Pt is a 26 y/o female here today for routine f/u  PMH includes DM I, hashimoto's, bipolar, depression/anxiety, ocd, ptsd  She has had persistent systemic complaints for over a year including weakness, joint pain, LLE numbness, amenorrhea, headaches, fatigue, weight gain, appetite loss, hyperglycemia, cognitive impairment  She has seen several specialists with extensive lab teseting and imaging  Most recently she was seen by neurology to r/o MS  They feel findings are not supportive of MS but labs did show rheumatological abnormalities including speckled SOHAM, positive RF IgG  They referred her to rheumatology but she is not scheduled until June  She has been discussing thyroid testing with endocrinology at , who manages her diabetes  She is concerned that all of her symptoms could be related to thyroid function, neuro advised her to get more extensive thyroid testing done  She says the endocrinologist has only checked her TSH and indicated they do not feel further evaluation is needed  They do have her now on an insulin pump again and sugars have been better compared with some of the severe hyperglycemia she has experienced in the past   When sugar goes up she gets chest pain and palpitations that go down once sugar goes down    Her appetite is poor, she follows her carb count and diabetic diet well but still continues with high insulin needs   She says she eats about 1200 calories per day and still gains weight and struggles to lose  She notes an intermittent presentation of red rash on her face across the nose and on the cheeks, says it looks like a sunburn  Intermittent "red patches" on skin occur as well  The following portions of the patient's history were reviewed and updated as appropriate: allergies, current medications, past family history, past medical history, past social history, past surgical history and problem list     Review of Systems   Constitutional: Positive for appetite change and unexpected weight change  Negative for activity change, chills, fatigue and fever  HENT: Negative for hearing loss  Eyes: Negative for visual disturbance  Respiratory: Negative for cough, chest tightness and shortness of breath  Cardiovascular: Positive for chest pain and palpitations  Negative for leg swelling  Symptoms occur when blood sugar goes up   Gastrointestinal: Negative for constipation, diarrhea, nausea and vomiting  Genitourinary: Positive for menstrual problem  Negative for dysuria and frequency  Musculoskeletal: Positive for arthralgias and myalgias (intermittent muscle cramp)  Skin: Negative for rash  Dry skin, brittle hair  Periodic red splotches, occasional butterfly rash  Allergic/Immunologic: Negative for environmental allergies  Neurological: Positive for weakness, numbness (left lower leg) and headaches  Negative for dizziness  Psychiatric/Behavioral: Positive for decreased concentration and sleep disturbance (sleeping 12 hours)  The patient is not nervous/anxious  Objective:      /72   Pulse 86   Temp 98 7 °F (37 1 °C) (Tympanic)   Resp 16   Ht 5' 1" (1 549 m)   Wt 72 kg (158 lb 12 8 oz)   SpO2 98%   BMI 30 00 kg/m²          Physical Exam  Vitals signs reviewed  Constitutional:       General: She is awake  Appearance: Normal appearance   She is well-developed  She is obese  She is not ill-appearing or toxic-appearing  HENT:      Head: Normocephalic  Right Ear: Hearing and external ear normal       Left Ear: Hearing and external ear normal    Eyes:      General: Lids are normal       Conjunctiva/sclera: Conjunctivae normal       Pupils: Pupils are equal, round, and reactive to light  Neck:      Thyroid: No thyroid mass, thyromegaly or thyroid tenderness  Vascular: Normal carotid pulses  No carotid bruit or JVD  Cardiovascular:      Rate and Rhythm: Normal rate and regular rhythm  Pulses: Normal pulses  Heart sounds: Normal heart sounds  No murmur  Pulmonary:      Effort: Pulmonary effort is normal       Breath sounds: Normal breath sounds  Abdominal:      General: Abdomen is flat  Bowel sounds are normal       Palpations: Abdomen is soft  Tenderness: There is no abdominal tenderness  Musculoskeletal: Normal range of motion  Right lower leg: No edema  Left lower leg: No edema  Lymphadenopathy:      Cervical: No cervical adenopathy  Skin:     General: Skin is warm and dry  Findings: No rash  Comments: Skin is very dry   Neurological:      Mental Status: She is alert and oriented to person, place, and time  Motor: Motor function is intact  Deep Tendon Reflexes: Reflexes are normal and symmetric  Psychiatric:         Attention and Perception: Attention normal          Mood and Affect: Mood normal          Speech: Speech normal          Behavior: Behavior normal  Behavior is cooperative  Thought Content:  Thought content normal          Cognition and Memory: Cognition normal          Judgment: Judgment normal       Comments: Stutter noted at times

## 2021-03-10 ENCOUNTER — TELEPHONE (OUTPATIENT)
Dept: INTERNAL MEDICINE CLINIC | Facility: CLINIC | Age: 26
End: 2021-03-10

## 2021-03-10 ENCOUNTER — TELEMEDICINE (OUTPATIENT)
Dept: BEHAVIORAL/MENTAL HEALTH CLINIC | Facility: CLINIC | Age: 26
End: 2021-03-10
Payer: COMMERCIAL

## 2021-03-10 DIAGNOSIS — F41.0 GENERALIZED ANXIETY DISORDER WITH PANIC ATTACKS: ICD-10-CM

## 2021-03-10 DIAGNOSIS — F43.12 CHRONIC POST-TRAUMATIC STRESS DISORDER (PTSD): ICD-10-CM

## 2021-03-10 DIAGNOSIS — F31.63 BIPOLAR DISORDER, CURRENT EPISODE MIXED, SEVERE, WITHOUT PSYCHOTIC FEATURES (HCC): ICD-10-CM

## 2021-03-10 DIAGNOSIS — F42.2 MIXED OBSESSIONAL THOUGHTS AND ACTS: Primary | ICD-10-CM

## 2021-03-10 DIAGNOSIS — F41.1 GENERALIZED ANXIETY DISORDER WITH PANIC ATTACKS: ICD-10-CM

## 2021-03-10 PROCEDURE — 90834 PSYTX W PT 45 MINUTES: CPT | Performed by: SOCIAL WORKER

## 2021-03-10 NOTE — BH TREATMENT PLAN
Melba Mcnamara  1995       Date of Initial Treatment Plan: 8/12/19  Date of Current Treatment Plan: 3/10/21       Treatment Plan Number 5     Strengths/Personal Resources for Self Care: I'm a good student, intelligent, resilient     Diagnosis:   1  Chronic post-traumatic stress disorder (PTSD)      2  Generalized anxiety disorder with panic attacks      3  Mixed obsessional thoughts and acts      4  Bipolar Affective Disorder; Current Episode Severe            Area of Needs: Trauma, 'I'm a hypochondriac", intimacy, self worth, complex medical issues     Long Term Goal 1:   I have address my trauma  Target Date: 9/5/21  Completion Date:  TBD     Short Term Objectives for Goal 1:   1  I no longer live in fear  2  I have forgiven my mom  3  I can be in neighborhoods without panic or flashbacks  4  I have more self worth     Long Term Goal 2:   My medical issues are under control  Target Date: 9/5/21  Completion Date:  TBD     Short Term Objectives for Goal 2:    1  Dion is fully trained as a diabetic service dog   2  Endocrinology        2  I have my independence     Long Term Goal 3:   My anxiety and depression have diminished  Target Date: 9/5/21  Completion Date:  TBD     Short Term Objectives for Goal 3:    1 Utilize my skills   2  My stomach issues/Diabetes   3   Find an effective med for depression/anxiety            GOAL 1: Modality: Individual Therapy 1x/week   Completion Date: TBD                                  Medication management 1x/month   Completion Date: D                                  [email or web address] responsible for goals: Yane Alvarado and Violet Ax      GOAL 2: Modality:I ndividual Therapy 1x/week   Completion Date: TBD                                   Medication management 1x/month   Completion Date: D                                   GSSTSRIUBHT responsible for goals: Yane Alvarado and Violet Ax      GOAL 3: Modality:I ndividual Therapy 1x/week   Completion Date: TBD                                   Medication management 1x/month   Completion Date: TBD                                   Individuals responsible for goals: Yane Alvarado and Dr Middleton [address] Treatment Plan [facility]: Diagnosis and Treatment Plan explained to Marguerite Griffin relates understanding diagnosis and is agreeable to Treatment Plan         Client Comments : Please share your thoughts, feelings, need and/or experiences regarding your treatment plan:

## 2021-03-10 NOTE — PSYCH
This note was not shared with the patient due to this is a psychotherapy note    Virtual Regular Visit      Assessment/Plan:    Problem List Items Addressed This Visit        Other    Chronic post-traumatic stress disorder (PTSD)    Generalized anxiety disorder with panic attacks    OCD (obsessive compulsive disorder) - Primary    Bipolar affective disorder, current episode severe ([address] )               Reason for visit is No chief complaint on file  Encounter provider Doug Lunsford    Provider located at [address] [place] [id number] [phone]      Recent Visits  Date Type Provider Dept   03/09/21 Office Visit [facility] [address] Internal Med   Showing recent visits within past 7 days and meeting all other requirements     Today's Visits  Date Type Provider Dept   03/10/21 [facility] Therapist [facility]   Showing today's visits and meeting all other requirements     Future Appointments  No visits were found meeting these conditions  Showing future appointments within next 150 days and meeting all other requirements        The patient was identified by name and date of birth  Santo Stout was informed that this is a telemedicine visit and that the visit is being conducted through [email or web address] and patient was informed that this is a secure, HIPAA-compliant platform  She agrees to proceed     My office door was closed  No one else was in the room  She acknowledged consent and understanding of privacy and security of the video platform  The patient has agreed to participate and understands they can discontinue the visit at any time  Patient is aware this is a billable service  Subjective  Santo Stout is a 27 y o  female    D:  Met with Ayush Peterson individually   'I'm so frustrated with all of this '   Getting out of the to help nephew get to school  Nice weather has helped mood  PCP is helping Alexandra Clarke see Rheumatologist sooner  Will be going to clinica at [facility] - through Endo who is [facility]  Trying to learn to talk for herself 'I don't want to depend of Prince Mcdonald to come in with me to explain things'  Progress  A: Alexandra Clarke was out today with nephew taking him to work - she is trying to get out more - progress  P: [facility] individual therapy    HPI     Past Medical History:   Diagnosis Date    Abdominal pain     Anxiety     Anxiety and depression     Depression     Diabetes [facility] )     type 1    Diabetes mellitus ([facility] ) 1/17/2019    Eating disorder     history of anorexia/bulemia [id number]    Fracture of fibula     R Salter I    Hashimoto's disease 2/21/2020    Head injury     Nasal congestion     Obsessive-compulsive disorder     PTSD (post-traumatic stress disorder)     Rectal bleed     Seizures ([facility])        Past Surgical History:   Procedure Laterality Date    KNEE SURGERY Right 07/06/2020    NASAL SEPTOPLASTY W/ TURBINOPLASTY      SINUS SURGERY      WISDOM TOOTH EXTRACTION      WRIST SURGERY      left;  Excision of ganglion       Current Outpatient Medications   Medication Sig Dispense Refill    albuterol (PROVENTIL HFA,VENTOLIN HFA) 90 mcg/act inhaler Inhale 1 puff every 6 (six) hours as needed for wheezing or shortness of breath 1 Inhaler 5    Altavera 0 15-30 MG-MCG per tablet TAKE ONE TABLET BY MOUTH EVERY DAY 84 tablet 3    amitriptyline (ELAVIL) 10 mg tablet Take 1 tablet (10 mg total) by mouth daily at bedtime 30 tablet 2    ARIPiprazole (ABILIFY) 2 mg tablet TAKE 1 TABLET BY MOUTH DAILY 30 tablet 2    Blood Glucose Monitoring Suppl (ONETOUCH VERIO) w/Device KIT Use as directed  0    cholecalciferol (VITAMIN D3) 1,000 units tablet Take 5,000 Units by mouth daily       clonazePAM (KlonoPIN) 0 5 mg tablet Take 1 tablet (0 5 mg total) by mouth 2 (two) times a day 30 tablet 2    clotrimazole-betamethasone (LOTRISONE) 1-0 05 % cream Apply topically as needed       desvenlafaxine succinate (PRISTIQ) 50 mg 24 hr tablet TAKE 1 TABLET BY MOUTH DAILY 30 tablet 2    divalproex sodium (DEPAKOTE ER) 250 mg 24 hr tablet Take 1 tablet (250 mg total) by mouth daily at bedtime Total dose 750 at bedtime 30 tablet 2    divalproex sodium (DEPAKOTE ER) 500 mg 24 hr tablet Take 1 tablet (500 mg total) by mouth daily 30 tablet 2    EPINEPHrine (EPIPEN) 0 3 mg/0 3 mL SOAJ Inject 0 3 mL (0 3 mg total) into a muscle once for 1 dose 0 6 mL 0    glucagon (GLUCAGON EMERGENCY) 1 MG injection Inject 1 mg under the skin once as needed for low blood sugar for up to 2 doses 1 kit 1    ibuprofen (MOTRIN) 600 mg tablet TAKE 1 TABLET BY MOUTH EVERY 6 HOURS AS NEEDED FOR MILD PAIN (PAIN SCORE 1-3)      insulin aspart (NovoLOG) 100 units/mL injection Use 30 units per day via pump Disp 1 vial per month (Patient taking differently: Use 100units per day via pump Disp 2 vial per month) 30 mL 1    Insulin Infusion Pump KIT 13 mm cannula, 23 inch tubing change site every 3 days      Insulin Pen Needle (BD PEN NEEDLE NASIM U/F) 32G X 4 MM MISC by Does not apply route 4 (four) times a day for 180 days 400 each 3    methocarbamol (ROBAXIN) 500 mg tablet Take 500 mg by mouth daily as needed for muscle spasms      Multiple Vitamin (DAILY VALUE MULTIVITAMIN) TABS Take by mouth daily       ONETOUCH DELICA LANCETS 96H MISC Patient test 6 times daily 600 each 1    ONETOUCH VERIO test strip Test six times a day 600 each 3     No current facility-administered medications for this visit  Allergies   Allergen Reactions    Insulin Glargine      Burning and redness under skin    Iodides Throat Swelling and Swelling     Reaction Date: 28Jul2014; Action Taken: none; Category: Allergy; Reaction Date: 28Jul2014; Action Taken: none; Category:  Allergy;     Iodine             I spent 50 minutes directly with the patient during this visit      VIRTUAL VISIT DISCLAIMER    Jaxson Nation acknowledges that she has consented to an online visit or consultation  She understands that the online visit is based solely on information provided by her, and that, in the absence of a face-to-face physical evaluation by the physician, the diagnosis she receives is both limited and provisional in terms of accuracy and completeness  This is not intended to replace a full medical face-to-face evaluation by the physician  Jaxson Trejoguillermo understands and accepts these terms

## 2021-03-10 NOTE — TELEPHONE ENCOUNTER
Roseanne Crabtree is returning your call in regards to pt  She can be reached back at [phone]      Thank you

## 2021-03-11 NOTE — H&P (VIEW-ONLY)
[facility]  OTOLARYNGOLOGY - HEAD & NECK SURGERY    Clary Abdi  [id number]  1995    HISTORY AND PHYSICAL     History of Present Illness:  79-year-old woman who presents for follow-up  I have seen her in the past for nasal obstruction and she is undergone septoplasty and turbinate reduction in the past   However, she is having more recurrent nasal obstruction, session scheduled to get a revision turbinoplasty in January  However preop Covid testing did reveal that she had Covid-19  She states that a couple days after I saw she developed loss of taste and smell  She does have some issues with her breathing  She was on a steroid inhaler for about a month  Her diabetes was hard to manage, and she has had she admitted to the hospital for this  She states that she is much better at this time  She saw her PCP yesterday  HISTORICALLY: 79-year-old woman who comes in for further evaluation of nasal congestion  She states that she had issues with nasal congestion ever since she was little  However for the past 3 or 4 months it has been getting worse  Typically, she gets nasal congestion when she lies down to go to bed  Is independent congestion, meaning that if she lies down on her right side that the right side of the nose will get congested  Both sides are affected in this manner  No issues with nose bleeding  Her smell and taste is okay  She does have a long history of migraines  She has facial pressure underneath her eyes  She will have a sudden sharp one-sided pain  She usually takes Excedrin or Tylenol Extra Strength for this  She will have 1 to headaches per week  She does have nausea associated with this as well as a visual aura of speckles  She has tried humidifiers, saline spray, as well as a Vicks nasal spray  She states that the Vicks nasal spray sometimes works  Today, both sides are not clear  She has some congestion    She states that she does not really get sinus infections  Her last discolored nasal drainage was last year  She denies postnasal drip, throat clearing, and cough  She has had ulcers in the past associated with each pylori  She states that she has no history of allergy  She did have exercise-induced asthma around [age]years old and had an inhaler at that time  Review of Systems: Patient denies fevers or chills  All other systems reviewed and found to be negative unless otherwise noted in the HPI or MA note  Vitals:    03/11/21 0901   Temp: 98 7 °F (37 1 °C)   Weight: 71 7 kg (158 lb)   Height: 5' 1" (1 549 m)      General Appearance: No apparent distress    Head: Normocephalic, atraumatic  Face: Symmetric without obvious lesions  Eyes: Conjunctiva clear, extraocular movements are intact  Ears: Pinna normal shape and position  Canals are clear  TMs intact with no middle ear effusion  Nose: External pyramid midline  Anterior rhinoscopy was performed, and she does have some turbinate hypertrophy  Septum is relatively straight  Oral cavity/Oropharynx: No mucosal lesions, masses, or pharyngeal asymmetry  Neck: No cervical lymphadenopathy or masses appreciated     Skin: Skin warm and dry    Respiratory: No stridor or audible wheezing    Cardiovascular: Good perfusion of the upper extremities and no cyanosis of the fingers or hands    Neurologic: Cranial nerves are grossly intact    Psychiatric: Alert and oriented    Assessment:  1  S/P nasal septoplasty     2  Nasal congestion     3  Non-seasonal allergic rhinitis due to other allergic trigger     4  Deviated nasal septum     5  Hypertrophy of inferior nasal turbinate            Plan: We will go ahead and proceed with revision turbinate reduction  At this point, would focus more on the bony hypertrophy to really reduce the size of the turbinate  She is agreeable after reviewing all the risks and benefits of the surgery    We did have some discussion at this time the left side looked more open at this time, but she is still having some congestion  We discussed the role her anxiety can play nasal obstruction as well  She understands this and still wishes to proceed  She will follow-up at the time of surgery  Stiven Casper MD  Otolaryngology - Head & Neck Surgery  [facility]      ** Please Note: Dictation voice to text software may have been used in the creation of this document   **

## 2021-03-12 LAB
BASOPHILS # BLD AUTO: 32 CELLS/UL (ref 0–200)
BASOPHILS NFR BLD AUTO: 0.5 %
CHOLEST SERPL-MCNC: 223 MG/DL
CHOLEST/HDLC SERPL: 3.8 (CALC)
EOSINOPHIL # BLD AUTO: 158 CELLS/UL (ref 15–500)
EOSINOPHIL NFR BLD AUTO: 2.5 %
ERYTHROCYTE [DISTWIDTH] IN BLOOD BY AUTOMATED COUNT: 12.1 % (ref 11–15)
HCT VFR BLD AUTO: 42.1 % (ref 35–45)
HDLC SERPL-MCNC: 59 MG/DL
HGB BLD-MCNC: 13.9 G/DL (ref 11.7–15.5)
LDLC SERPL CALC-MCNC: 145 MG/DL (CALC)
LEFT EYE DIABETIC RETINOPATHY: NORMAL
LYMPHOCYTES # BLD AUTO: 2841 CELLS/UL (ref 850–3900)
LYMPHOCYTES NFR BLD AUTO: 45.1 %
MCH RBC QN AUTO: 28.8 PG (ref 27–33)
MCHC RBC AUTO-ENTMCNC: 33 G/DL (ref 32–36)
MCV RBC AUTO: 87.2 FL (ref 80–100)
MONOCYTES # BLD AUTO: 536 CELLS/UL (ref 200–950)
MONOCYTES NFR BLD AUTO: 8.5 %
NEUTROPHILS # BLD AUTO: 2734 CELLS/UL (ref 1500–7800)
NEUTROPHILS NFR BLD AUTO: 43.4 %
NONHDLC SERPL-MCNC: 164 MG/DL (CALC)
PLATELET # BLD AUTO: 224 THOUSAND/UL (ref 140–400)
PMV BLD REES-ECKER: 12.2 FL (ref 7.5–12.5)
RBC # BLD AUTO: 4.83 MILLION/UL (ref 3.8–5.1)
RIGHT EYE DIABETIC RETINOPATHY: NORMAL
T4 FREE SERPL-MCNC: 1 NG/DL (ref 0.8–1.8)
TRIGL SERPL-MCNC: 83 MG/DL
TSH SERPL-ACNC: 1.77 MIU/L
WBC # BLD AUTO: 6.3 THOUSAND/UL (ref 3.8–10.8)

## 2021-03-17 ENCOUNTER — TELEMEDICINE (OUTPATIENT)
Dept: BEHAVIORAL/MENTAL HEALTH CLINIC | Facility: CLINIC | Age: 26
End: 2021-03-17
Payer: COMMERCIAL

## 2021-03-17 ENCOUNTER — TELEPHONE (OUTPATIENT)
Dept: NEUROLOGY | Facility: CLINIC | Age: 26
End: 2021-03-17

## 2021-03-17 DIAGNOSIS — F41.1 GENERALIZED ANXIETY DISORDER WITH PANIC ATTACKS: ICD-10-CM

## 2021-03-17 DIAGNOSIS — F43.12 CHRONIC POST-TRAUMATIC STRESS DISORDER (PTSD): ICD-10-CM

## 2021-03-17 DIAGNOSIS — F41.0 GENERALIZED ANXIETY DISORDER WITH PANIC ATTACKS: ICD-10-CM

## 2021-03-17 DIAGNOSIS — F31.63 BIPOLAR DISORDER, CURRENT EPISODE MIXED, SEVERE, WITHOUT PSYCHOTIC FEATURES (HCC): ICD-10-CM

## 2021-03-17 DIAGNOSIS — F42.2 MIXED OBSESSIONAL THOUGHTS AND ACTS: Primary | ICD-10-CM

## 2021-03-17 PROCEDURE — 90834 PSYTX W PT 45 MINUTES: CPT | Performed by: SOCIAL WORKER

## 2021-03-17 NOTE — PSYCH
This note was not shared with the patient due to this is a psychotherapy note    Virtual Regular Visit      Assessment/Plan:    Problem List Items Addressed This Visit        Other    Chronic post-traumatic stress disorder (PTSD)    Generalized anxiety disorder with panic attacks    OCD (obsessive compulsive disorder) - Primary    Bipolar affective disorder, current episode severe ([address] )               Reason for visit is No chief complaint on file  Encounter provider Mahesh Aranda    Provider located at [address] [place] [id number] [phone]      Recent Visits  Date Type Provider Dept   03/10/21 [facility] Therapist Ivan   Showing recent visits within past 7 days and meeting all other requirements     Today's Visits  Date Type Provider Dept   03/17/21 [facility] Therapist Ivan   Showing today's visits and meeting all other requirements     Future Appointments  No visits were found meeting these conditions  Showing future appointments within next 150 days and meeting all other requirements        The patient was identified by name and date of birth  Stacey Ramirez was informed that this is a telemedicine visit and that the visit is being conducted through [employer] and patient was informed that this is a secure, HIPAA-compliant platform  She agrees to proceed     My office door was closed  No one else was in the room  She acknowledged consent and understanding of privacy and security of the video platform  The patient has agreed to participate and understands they can discontinue the visit at any time  Patient is aware this is a billable service  Subjective  Stacey Ramirez is a 27 y o  female    D: Met with Shantal Babb individually    'I'm ok I guess '  Still attending college virtually but started a new class in person  'I became totally over loaded and lost it in the middle of class'  Spoke with teacher after class to discuss accommodations  Found she has 6 weeks post classes to get work done which takes a lot of pressure off  Having surgery on nose next week  Looking forward to this as she hasn't been able to breathe properly for years  Relationship with Bob Massey is 'not great  I'm really unsure '  Wants to 'push it away'  Denied SI    A: Arun Goldstein presented with depressed mood and constricted affect  She is motivated to keep moving on and looking into schools to transfer to [employer]  Progress    P: Continue individual therapy          HPI     Past Medical History:   Diagnosis Date    Abdominal pain     Anxiety     Anxiety and depression     Depression     Diabetes ([facility] [address] )     type 1    Diabetes mellitus ([facility] [address] ) 1/17/2019    Eating disorder     history of anorexia/bulemia [id number]    Fracture of fibula     R Salter I    Hashimoto's disease 2/21/2020    Head injury     Nasal congestion     Obsessive-compulsive disorder     PTSD (post-traumatic stress disorder)     Rectal bleed     Seizures (HCC)        Past Surgical History:   Procedure Laterality Date    KNEE SURGERY Right 07/06/2020    NASAL SEPTOPLASTY W/ TURBINOPLASTY      SINUS SURGERY      WISDOM TOOTH EXTRACTION      WRIST SURGERY      left;  Excision of ganglion       Current Outpatient Medications   Medication Sig Dispense Refill    albuterol (PROVENTIL HFA,VENTOLIN HFA) 90 mcg/act inhaler Inhale 1 puff every 6 (six) hours as needed for wheezing or shortness of breath 1 Inhaler 5    Altavera 0 15-30 MG-MCG per tablet TAKE ONE TABLET BY MOUTH EVERY DAY 84 tablet 3    amitriptyline (ELAVIL) 10 mg tablet Take 1 tablet (10 mg total) by mouth daily at bedtime 30 tablet 2    ARIPiprazole (ABILIFY) 2 mg tablet TAKE 1 TABLET BY MOUTH DAILY 30 tablet 2    Blood Glucose Monitoring Suppl (ONETOUCH VERIO) w/Device KIT Use as directed  0  cholecalciferol (VITAMIN D3) 1,000 units tablet Take 5,000 Units by mouth daily       clonazePAM (KlonoPIN) 0 5 mg tablet Take 1 tablet (0 5 mg total) by mouth 2 (two) times a day 30 tablet 2    clotrimazole-betamethasone (LOTRISONE) 1-0 05 % cream Apply topically as needed       desvenlafaxine succinate (PRISTIQ) 50 mg 24 hr tablet TAKE 1 TABLET BY MOUTH DAILY 30 tablet 2    divalproex sodium (DEPAKOTE ER) 250 mg 24 hr tablet Take 1 tablet (250 mg total) by mouth daily at bedtime Total dose 750 at bedtime 30 tablet 2    divalproex sodium (DEPAKOTE ER) 500 mg 24 hr tablet Take 1 tablet (500 mg total) by mouth daily 30 tablet 2    EPINEPHrine (EPIPEN) 0 3 mg/0 3 mL SOAJ Inject 0 3 mL (0 3 mg total) into a muscle once for 1 dose 0 6 mL 0    glucagon (GLUCAGON EMERGENCY) 1 MG injection Inject 1 mg under the skin once as needed for low blood sugar for up to 2 doses 1 kit 1    ibuprofen (MOTRIN) 600 mg tablet TAKE 1 TABLET BY MOUTH EVERY 6 HOURS AS NEEDED FOR MILD PAIN (PAIN SCORE 1-3)      insulin aspart (NovoLOG) 100 units/mL injection Use 30 units per day via pump Disp 1 vial per month (Patient taking differently: Use 100units per day via pump Disp 2 vial per month) 30 mL 1    Insulin Infusion Pump KIT 13 mm cannula, 23 inch tubing change site every 3 days      Insulin Pen Needle (BD PEN NEEDLE NASIM U/F) 32G X 4 MM MISC by Does not apply route 4 (four) times a day for 180 days 400 each 3    methocarbamol (ROBAXIN) 500 mg tablet Take 500 mg by mouth daily as needed for muscle spasms      Multiple Vitamin (DAILY VALUE MULTIVITAMIN) TABS Take by mouth daily       ONETOUCH DELICA LANCETS 45T MISC Patient test 6 times daily 600 each 1    ONETOUCH VERIO test strip Test six times a day 600 each 3     No current facility-administered medications for this visit           Allergies   Allergen Reactions    Insulin Glargine      Burning and redness under skin    Iodides Throat Swelling and Swelling Reaction Date: 28Jul2014; Action Taken: none; Category: Allergy; Reaction Date: 28Jul2014; Action Taken: none; Category: Allergy;     Iodine            I spent 50 minutes directly with the patient during this visit      VIRTUAL VISIT DISCLAIMER    Luis Cronin acknowledges that she has consented to an online visit or consultation  She understands that the online visit is based solely on information provided by her, and that, in the absence of a face-to-face physical evaluation by the physician, the diagnosis she receives is both limited and provisional in terms of accuracy and completeness  This is not intended to replace a full medical face-to-face evaluation by the physician  Luis Cronin understands and accepts these terms

## 2021-03-17 NOTE — TELEPHONE ENCOUNTER
Pt left voicemail asking for her office notes and labs to be faxed to her rheumatologist at [phone]  Rheumatologist is not in the  network per the patient  Our office did not place the referral to rheumatology, cannot send records without [facility]  Called patient and left detailed message (okay per communication consent) and advised her to call the medical records dept  Also advised that lab results and office notes are also available on [employer]

## 2021-03-18 ENCOUNTER — ANESTHESIA EVENT (OUTPATIENT)
Dept: PERIOP | Facility: HOSPITAL | Age: 26
End: 2021-03-18
Payer: COMMERCIAL

## 2021-03-18 NOTE — PRE-PROCEDURE INSTRUCTIONS
Pre-Surgery Instructions:   Medication Instructions    albuterol (PROVENTIL HFA,VENTOLIN HFA) 90 mcg/act inhaler use day of surgery and bring with her    Altavera 0 15-30 MG-MCG per tablet takes at night    amitriptyline (ELAVIL) 10 mg tablet takes at night    ARIPiprazole (ABILIFY) 2 mg tablet takes at night    cholecalciferol (VITAMIN D3) 1,000 units tablet hold day of surgery    clonazePAM (KlonoPIN) 0 5 mg tablet take day of surgery    desvenlafaxine succinate (PRISTIQ) 50 mg 24 hr tablet takes at night    divalproex sodium (DEPAKOTE ER) 250 mg 24 hr tablet takes at night    insulin aspart (NovoLOG) 100 units/mL injection insulin pump, instructed to bring needed supplies with her    Multiple Vitamin (DAILY VALUE MULTIVITAMIN) TABS hold day of surgery    My Surgical Experience    The following information was developed to assist you to prepare for your operation  What do I need to do before coming to the hospital?   Arrange for a responsible person to drive you to and from the hospital    Arrange care for your children at home  Children are not allowed in the recovery areas of the hospital   Plan to wear clothing that is easy to put on and take off  If you are having shoulder surgery, wear a shirt that buttons or zippers in the front  Bathing  o Shower the evening before and the morning of your surgery with an antibacterial soap  Please refer to the Pre Op Showering Instructions for Surgery Patients Sheet   o Remove nail polish and all body piercing jewelry  o Do not shave any body part for at least 24 hours before surgery-this includes face, arms, legs and upper body  Food  o Nothing to eat or drink after midnight the night before your surgery   This includes candy and chewing gum  o Exception: If your surgery is after 12:00pm (noon), you may have clear liquids such as 7-Up®, ginger ale, apple or cranberry juice, Jell-O®, water, or clear broth until 8:00 am  o Do not drink milk or juice with pulp on the morning before surgery  o Do not drink alcohol 24 hours before surgery  Medicine  o Follow instructions you received from your surgeon about which medicines you may take on the day of surgery  o If instructed to take medicine on the morning of surgery, take pills with just a small sip of water  Call your prescribing doctor for specific infroamtion on what to do if you take insulin    What should I bring to the hospital?    Bring:  Jn Glad or a walker, if you have them, for foot or knee surgery   A list of the daily medicines, vitamins, minerals, herbals and nutritional supplements you take  Include the dosages of medicines and the time you take them each day   Glasses, dentures or hearing aids   Minimal clothing; you will be wearing hospital sleepwear   Photo ID; required to verify your identity   If you have a Living Will or Power of , bring a copy of the documents   If you have an ostomy, bring an extra pouch and any supplies you use    Do not bring   Medicines or inhalers   Money, valuables or jewelry    What other information should I know about the day of surgery?  Notify your surgeons if you develop a cold, sore throat, cough, fever, rash or any other illness   Report to the Ambulatory Surgical/Same Day Surgery Unit   You will be instructed to stop at Registration only if you have not been pre-registered   Inform your  fi they do not stay that they will be asked by the staff to leave a phone number where they can be reached   Be available to be reached before surgery  In the event the operating room schedule changes, you may be asked to come in earlier or later than expected    *It is important to tell your doctor and others involved in your health care if you are taking or have been taking any non-prescription drugs, vitamins, minerals, herbals or other nutritional supplements   Any of these may interact with some food or medicines and cause a reaction

## 2021-03-22 ENCOUNTER — HOSPITAL ENCOUNTER (OUTPATIENT)
Facility: HOSPITAL | Age: 26
Setting detail: OUTPATIENT SURGERY
Discharge: HOME | End: 2021-03-22
Attending: OTOLARYNGOLOGY | Admitting: OTOLARYNGOLOGY
Payer: COMMERCIAL

## 2021-03-22 ENCOUNTER — ANESTHESIA (OUTPATIENT)
Dept: PERIOP | Facility: HOSPITAL | Age: 26
End: 2021-03-22
Payer: COMMERCIAL

## 2021-03-22 VITALS
OXYGEN SATURATION: 98 % | RESPIRATION RATE: 12 BRPM | DIASTOLIC BLOOD PRESSURE: 58 MMHG | HEIGHT: 61 IN | WEIGHT: 160 LBS | HEART RATE: 105 BPM | BODY MASS INDEX: 30.21 KG/M2 | TEMPERATURE: 98.5 F | SYSTOLIC BLOOD PRESSURE: 95 MMHG

## 2021-03-22 DIAGNOSIS — Z98.890 S/P NASAL SURGERY: Primary | ICD-10-CM

## 2021-03-22 PROBLEM — U07.1 COVID-19: Status: RESOLVED | Noted: 2020-12-31 | Resolved: 2021-03-22

## 2021-03-22 PROBLEM — Z87.898 HISTORY OF ANESTHESIA COMPLICATIONS: Status: ACTIVE | Noted: 2021-03-22

## 2021-03-22 LAB
EXT PREGNANCY TEST URINE: NEGATIVE
EXT. CONTROL: NORMAL
GLUCOSE SERPL-MCNC: 116 MG/DL (ref 65–140)
GLUCOSE SERPL-MCNC: 120 MG/DL (ref 65–140)
GLUCOSE SERPL-MCNC: 146 MG/DL (ref 65–140)
GLUCOSE SERPL-MCNC: 86 MG/DL (ref 65–140)

## 2021-03-22 PROCEDURE — 81025 URINE PREGNANCY TEST: CPT | Performed by: OTOLARYNGOLOGY

## 2021-03-22 PROCEDURE — 82948 REAGENT STRIP/BLOOD GLUCOSE: CPT

## 2021-03-22 PROCEDURE — 30140 RESECT INFERIOR TURBINATE: CPT | Performed by: OTOLARYNGOLOGY

## 2021-03-22 PROCEDURE — 31231 NASAL ENDOSCOPY DX: CPT | Performed by: OTOLARYNGOLOGY

## 2021-03-22 RX ORDER — HYDROCODONE BITARTRATE AND ACETAMINOPHEN 5; 325 MG/1; MG/1
1 TABLET ORAL EVERY 6 HOURS PRN
Qty: 15 TABLET | Refills: 0 | Status: SHIPPED | OUTPATIENT
Start: 2021-03-22 | End: 2021-04-01

## 2021-03-22 RX ORDER — LIDOCAINE HYDROCHLORIDE AND EPINEPHRINE 10; 10 MG/ML; UG/ML
INJECTION, SOLUTION INFILTRATION; PERINEURAL AS NEEDED
Status: DISCONTINUED | OUTPATIENT
Start: 2021-03-22 | End: 2021-03-22 | Stop reason: HOSPADM

## 2021-03-22 RX ORDER — LIDOCAINE HYDROCHLORIDE 10 MG/ML
0.5 INJECTION, SOLUTION EPIDURAL; INFILTRATION; INTRACAUDAL; PERINEURAL ONCE AS NEEDED
Status: DISCONTINUED | OUTPATIENT
Start: 2021-03-22 | End: 2021-03-22 | Stop reason: HOSPADM

## 2021-03-22 RX ORDER — SODIUM CHLORIDE, SODIUM LACTATE, POTASSIUM CHLORIDE, CALCIUM CHLORIDE 600; 310; 30; 20 MG/100ML; MG/100ML; MG/100ML; MG/100ML
125 INJECTION, SOLUTION INTRAVENOUS CONTINUOUS
Status: DISCONTINUED | OUTPATIENT
Start: 2021-03-22 | End: 2021-03-22 | Stop reason: HOSPADM

## 2021-03-22 RX ORDER — SODIUM CHLORIDE, SODIUM LACTATE, POTASSIUM CHLORIDE, CALCIUM CHLORIDE 600; 310; 30; 20 MG/100ML; MG/100ML; MG/100ML; MG/100ML
INJECTION, SOLUTION INTRAVENOUS CONTINUOUS PRN
Status: DISCONTINUED | OUTPATIENT
Start: 2021-03-22 | End: 2021-03-22

## 2021-03-22 RX ORDER — LIDOCAINE HYDROCHLORIDE 10 MG/ML
INJECTION, SOLUTION EPIDURAL; INFILTRATION; INTRACAUDAL; PERINEURAL AS NEEDED
Status: DISCONTINUED | OUTPATIENT
Start: 2021-03-22 | End: 2021-03-22

## 2021-03-22 RX ORDER — ONDANSETRON 2 MG/ML
INJECTION INTRAMUSCULAR; INTRAVENOUS AS NEEDED
Status: DISCONTINUED | OUTPATIENT
Start: 2021-03-22 | End: 2021-03-22

## 2021-03-22 RX ORDER — ONDANSETRON 2 MG/ML
4 INJECTION INTRAMUSCULAR; INTRAVENOUS ONCE AS NEEDED
Status: DISCONTINUED | OUTPATIENT
Start: 2021-03-22 | End: 2021-03-22 | Stop reason: HOSPADM

## 2021-03-22 RX ORDER — AMOXICILLIN AND CLAVULANATE POTASSIUM 875; 125 MG/1; MG/1
1 TABLET, FILM COATED ORAL EVERY 12 HOURS SCHEDULED
Qty: 14 TABLET | Refills: 0 | Status: SHIPPED | OUTPATIENT
Start: 2021-03-22 | End: 2021-03-29

## 2021-03-22 RX ORDER — ROCURONIUM BROMIDE 10 MG/ML
INJECTION, SOLUTION INTRAVENOUS AS NEEDED
Status: DISCONTINUED | OUTPATIENT
Start: 2021-03-22 | End: 2021-03-22

## 2021-03-22 RX ORDER — HYDROCODONE BITARTRATE AND ACETAMINOPHEN 5; 325 MG/1; MG/1
1 TABLET ORAL EVERY 6 HOURS PRN
Status: DISCONTINUED | OUTPATIENT
Start: 2021-03-22 | End: 2021-03-22 | Stop reason: HOSPADM

## 2021-03-22 RX ORDER — HYDROMORPHONE HCL/PF 1 MG/ML
0.5 SYRINGE (ML) INJECTION
Status: DISCONTINUED | OUTPATIENT
Start: 2021-03-22 | End: 2021-03-22 | Stop reason: HOSPADM

## 2021-03-22 RX ORDER — ALBUTEROL SULFATE 2.5 MG/3ML
2.5 SOLUTION RESPIRATORY (INHALATION) ONCE AS NEEDED
Status: DISCONTINUED | OUTPATIENT
Start: 2021-03-22 | End: 2021-03-22 | Stop reason: HOSPADM

## 2021-03-22 RX ORDER — MIDAZOLAM HYDROCHLORIDE 2 MG/2ML
INJECTION, SOLUTION INTRAMUSCULAR; INTRAVENOUS AS NEEDED
Status: DISCONTINUED | OUTPATIENT
Start: 2021-03-22 | End: 2021-03-22

## 2021-03-22 RX ORDER — DEXMEDETOMIDINE HYDROCHLORIDE 100 UG/ML
INJECTION, SOLUTION INTRAVENOUS AS NEEDED
Status: DISCONTINUED | OUTPATIENT
Start: 2021-03-22 | End: 2021-03-22

## 2021-03-22 RX ORDER — OXYMETAZOLINE HYDROCHLORIDE 0.05 G/100ML
SPRAY NASAL AS NEEDED
Status: DISCONTINUED | OUTPATIENT
Start: 2021-03-22 | End: 2021-03-22 | Stop reason: HOSPADM

## 2021-03-22 RX ORDER — DIPHENHYDRAMINE HYDROCHLORIDE 50 MG/ML
12.5 INJECTION INTRAMUSCULAR; INTRAVENOUS ONCE AS NEEDED
Status: DISCONTINUED | OUTPATIENT
Start: 2021-03-22 | End: 2021-03-22 | Stop reason: HOSPADM

## 2021-03-22 RX ORDER — LORAZEPAM 2 MG/ML
1 INJECTION INTRAMUSCULAR ONCE
Status: COMPLETED | OUTPATIENT
Start: 2021-03-22 | End: 2021-03-22

## 2021-03-22 RX ORDER — PROPOFOL 10 MG/ML
INJECTION, EMULSION INTRAVENOUS AS NEEDED
Status: DISCONTINUED | OUTPATIENT
Start: 2021-03-22 | End: 2021-03-22

## 2021-03-22 RX ORDER — FENTANYL CITRATE 50 UG/ML
INJECTION, SOLUTION INTRAMUSCULAR; INTRAVENOUS AS NEEDED
Status: DISCONTINUED | OUTPATIENT
Start: 2021-03-22 | End: 2021-03-22

## 2021-03-22 RX ORDER — DEXAMETHASONE SODIUM PHOSPHATE 10 MG/ML
INJECTION, SOLUTION INTRAMUSCULAR; INTRAVENOUS AS NEEDED
Status: DISCONTINUED | OUTPATIENT
Start: 2021-03-22 | End: 2021-03-22

## 2021-03-22 RX ORDER — GINSENG 100 MG
CAPSULE ORAL AS NEEDED
Status: DISCONTINUED | OUTPATIENT
Start: 2021-03-22 | End: 2021-03-22 | Stop reason: HOSPADM

## 2021-03-22 RX ORDER — FENTANYL CITRATE/PF 50 MCG/ML
50 SYRINGE (ML) INJECTION
Status: DISCONTINUED | OUTPATIENT
Start: 2021-03-22 | End: 2021-03-22 | Stop reason: HOSPADM

## 2021-03-22 RX ORDER — PROPOFOL 10 MG/ML
INJECTION, EMULSION INTRAVENOUS CONTINUOUS PRN
Status: DISCONTINUED | OUTPATIENT
Start: 2021-03-22 | End: 2021-03-22

## 2021-03-22 RX ADMIN — DEXAMETHASONE SODIUM PHOSPHATE 5 MG: 10 INJECTION, SOLUTION INTRAMUSCULAR; INTRAVENOUS at 08:24

## 2021-03-22 RX ADMIN — LIDOCAINE HYDROCHLORIDE 50 MG: 10 INJECTION, SOLUTION EPIDURAL; INFILTRATION; INTRACAUDAL; PERINEURAL at 08:08

## 2021-03-22 RX ADMIN — SUGAMMADEX 250 MG: 100 INJECTION, SOLUTION INTRAVENOUS at 09:09

## 2021-03-22 RX ADMIN — FENTANYL CITRATE 50 MCG: 50 INJECTION INTRAMUSCULAR; INTRAVENOUS at 08:22

## 2021-03-22 RX ADMIN — PHENYLEPHRINE HYDROCHLORIDE 100 MCG: 10 INJECTION INTRAVENOUS at 08:49

## 2021-03-22 RX ADMIN — ROCURONIUM BROMIDE 50 MG: 50 INJECTION, SOLUTION INTRAVENOUS at 08:09

## 2021-03-22 RX ADMIN — PHENYLEPHRINE HYDROCHLORIDE 100 MCG: 10 INJECTION INTRAVENOUS at 08:52

## 2021-03-22 RX ADMIN — PROPOFOL 100 MG: 10 INJECTION, EMULSION INTRAVENOUS at 08:09

## 2021-03-22 RX ADMIN — LORAZEPAM 1 MG: 2 INJECTION INTRAMUSCULAR; INTRAVENOUS at 09:45

## 2021-03-22 RX ADMIN — PROPOFOL 300 MG: 10 INJECTION, EMULSION INTRAVENOUS at 08:08

## 2021-03-22 RX ADMIN — MIDAZOLAM 2 MG: 1 INJECTION INTRAMUSCULAR; INTRAVENOUS at 07:58

## 2021-03-22 RX ADMIN — DEXMEDETOMIDINE HCL 20 MCG: 100 INJECTION INTRAVENOUS at 09:56

## 2021-03-22 RX ADMIN — DEXMEDETOMIDINE HCL 20 MCG: 100 INJECTION INTRAVENOUS at 08:08

## 2021-03-22 RX ADMIN — FENTANYL CITRATE 25 MCG: 50 INJECTION INTRAMUSCULAR; INTRAVENOUS at 08:40

## 2021-03-22 RX ADMIN — ONDANSETRON 4 MG: 2 INJECTION INTRAMUSCULAR; INTRAVENOUS at 08:43

## 2021-03-22 RX ADMIN — LORAZEPAM 1 MG: 2 INJECTION INTRAMUSCULAR; INTRAVENOUS at 09:50

## 2021-03-22 RX ADMIN — PROPOFOL 50 MCG/KG/MIN: 10 INJECTION, EMULSION INTRAVENOUS at 09:00

## 2021-03-22 RX ADMIN — SODIUM CHLORIDE, SODIUM LACTATE, POTASSIUM CHLORIDE, AND CALCIUM CHLORIDE: .6; .31; .03; .02 INJECTION, SOLUTION INTRAVENOUS at 07:45

## 2021-03-22 NOTE — ANESTHESIA POSTPROCEDURE EVALUATION
Post-Op Assessment Note    CV Status:  Stable  Pain Score: 0    Pain management: adequate     Mental Status:  Somnolent   Hydration Status:  Stable   PONV Controlled:  None   Airway Patency:  Patent      Post Op Vitals Reviewed: Yes      Staff: CRNA   Comments: Patient sleeping in PACU, airway patent, VSS  No complications documented      BP 93/68 (03/22/21 0928)    Temp (!) 97 1 °F (36 2 °C) (03/22/21 0928)    Pulse 79 (03/22/21 0928)   Resp 15 (03/22/21 0928)    SpO2 100 % (03/22/21 0928)

## 2021-03-22 NOTE — PERIOPERATIVE NURSING NOTE
Pt abruptly started thrashing extremities and started moaning  Safety side rail pads applied to stretcher  Dr Brownlee Loud called and notified   Shalini Guevara CRNA at bedside and ativan ordered to be given

## 2021-03-22 NOTE — ANESTHESIA POSTPROCEDURE EVALUATION
Post-Op Assessment Note             Comments: Pt was drowsy on handoff to PACU, subsequently woke up more in PACU, screaming, flaily, requiring restraint, treated with ativan and precedex and pt now sleeping again  Similar to how pt described previous wake ups        No complications documented

## 2021-03-22 NOTE — ANESTHESIA PREPROCEDURE EVALUATION
Procedure:  TURBINOPLASTY (N/A Nose)    Relevant Problems   ANESTHESIA   (+) History of anesthesia complications (Emergence delirium - reports always wakes up combative)      ENDO   (+) Type 1 diabetes mellitus without complication (HCC)      GYN   (-) Currently pregnant      NEURO/PSYCH  Under care of neurologist for multiple vague neurogolic complaints - considering functional disorder vs atypical demyelinating disease   (+) Chronic post-traumatic stress disorder (PTSD)   (+) Generalized anxiety disorder with panic attacks   (+) History of anesthesia complications (Emergence delirium - reports always wakes up combative)   (+) OCD (obsessive compulsive disorder)      PULMONARY  prn inhaler for wheezing - last 1 week ago   (-) Sleep apnea   (-) Smoking      Other   (+) Bipolar affective disorder ([facility] [address] )   (+) COVID-19 (Resolved) (12/2020 - required steroid inhaler x 1 month, now resolved)   (+) Hashimoto's disease   (+) Insulin pump status    BMI 30    Physical Exam    Airway    Mallampati score: I  TM Distance: >3 FB  Neck ROM: full     Dental   No notable dental hx     Cardiovascular      Pulmonary      Other Findings       Lab Results   Component Value Date    WBC 6 3 03/11/2021    HGB 13 9 03/11/2021     03/11/2021     Lab Results   Component Value Date    SODIUM 139 12/08/2020    K 4 2 12/08/2020    BUN 16 12/08/2020    CREATININE 0 80 12/08/2020    EGFR 85 12/06/2019           Lab Results   Component Value Date    HGBA1C 6 8 (H) 09/14/2020       Anesthesia Plan  ASA Score- 3     Anesthesia Type- general with ASA Monitors  Additional Monitors:   Airway Plan: ETT  Comment: Insulin pump/glucose monitor in situ left arm/abdomen - BG currently 80 and rate is 0 - pump auto-adjusts to keep her  and will alarm if she is dropping          Plan Factors-Exercise tolerance (METS): >4 METS  Chart reviewed  Existing labs reviewed  Patient summary reviewed  Patient is not a current smoker  Induction- intravenous  Postoperative Plan-     Informed Consent- Anesthetic plan and risks discussed with patient and spouse  I personally reviewed this patient with the CRNA  Discussed and agreed on the Anesthesia Plan with the CRNA  Danyell Fernandes

## 2021-03-22 NOTE — PERIOPERATIVE NURSING NOTE
Pt has been resting comfortably for 1 hour  VSS  Pt now answering questions and nodding appropriately   Ok to send pt to [facility] as per Dr Jersey Bean

## 2021-03-22 NOTE — DISCHARGE INSTR - AVS FIRST PAGE
POST-OPERATIVE CARE INSTRUCTION SHEET      ABOUT POST-OPERATIVE CARE VISITS    Post-operative visits are an indispensable part of the surgery, since they help promote healing and prevent persistent or recurrent disease  You should plan on remaining within the [facility] area for at least one day following surgery  You will usually be seen within 7-10 days post-operatively for debridement (removal of crusts from your nose)  You should anticipate 2 office visits over the first 4 weeks after surgery  Continued debridement will be done at these visits, and persistent inflammation or scar tissue will be removed under local anesthesia  Although chances of complications from these manipulations are rare, the potential risks are the same as the surgery itself  WHAT YOU CAN EXPECT TO EXPERIENCE    You can expect some bleeding from your nose for several days after the surgery and again after each office debridement  When bleeding occurs down the front of your nose or into the back of your throat, you should tilt your head back while sitting up and breathe gently through your nose  If bleeding persists for an extended period of time notify our office  Over-the-counter Afrin nose spray (oxymetazoline) can be used to open your nose  and reduce bleeding during the first two nights after surgery if needed  As the sinuses begin to clear themselves, you can expect to have some thick brown drainage from your nose  This is mucus and old blood and does not indicate an infection  You may experience some discomfort post-operatively due to manipulation and inflammation  Take your pain medication as directed  Often extra-strength Tylenolâ is sufficient  You may wish to take medication for pain prior to your post-operative visits (particularly early on, when the nose is most sensitive)  If the medication is sedating, be sure to have someone available to drive you        SOME IMPORTANT POST-OPERATIVE DO'S AND DO NOT'S    DO NOT blow your nose until you have been given permission to do so  DO NOT bend, lift or strain for at least one week after surgery  These activities will promote bleeding from your nose  You should not plan on participating in rigorous activity until healing is completed  DO NOT suppress the need to cough or sneeze, but cough/sneeze with your mouth open  DO NOT resume use of any aspirin-containing products or other blood thinners until after discussing this with us  Typically, you can restart after 7-10 days  DO use nasal saline spray (without decongestant) every hour while you are awake beginning the day after surgery  This helps moisten your nose and prevents large crusts from forming  The preferred brand is Simply Saline due to lack of a preservative which is irritating to some people  DO use nasal saline irrigation 4-6 times daily beginning on post-operative day three if there is no nasal packing  DO continue antibiotics (if they have been prescribed for you)  Diarrhea from antibiotic usage can lead to a serious health problem  This can often be prevented by taking probiotics daily, which is found in yogurt with active cultures or as tablets in a health food store  If you should experience diarrhea, stop the antibiotic and notify us  Further evaluation may be required  DO notify us for any of the following: temperature elevations above 100 5 F, clear watery drainage from your nose, changes in vision, swelling of the eyes, worsening headache or neck stiffness  Contact our office for an emergency or go to the emergency room nearest to you

## 2021-03-22 NOTE — INTERVAL H&P NOTE
H&P reviewed  After examining the patient I find no changes in the patients condition since the H&P had been written  Vitals:    03/22/21 0714   BP: 114/77   Pulse: 86   Resp: 16   Temp: 98 3 °F (36 8 °C)   SpO2: 95%     RRR, CTAB    Plan: To OR for turbinate reduction with therapeutic outfracture

## 2021-03-22 NOTE — OP NOTE
OPERATIVE REPORT  PATIENT NAME: Missouri Brittle    :  1995  MRN: [id number]  Pt Location: [facility] OR ROOM 06    SURGERY DATE: 3/22/2021    Surgeon(s) and Role:     * Nora Ramsay MD - Primary    Preop Diagnosis:  Nasal Obstruction  Nasal congestion  Nasal turbinate hypertrophy [J34 3]    Post-Op Diagnosis Codes:  Nasal Obstruction  Nasal congestion  Nasal turbinate hypertrophy [J34 3]    Procedure(s) (LRB):  1  Bilateral inferior turbinate reduction with therapeutic outfracture  2  Nasal endoscopy    Specimen(s):  * No specimens in log *    Estimated Blood Loss:   Minimal    Drains:  * No LDAs found *    Anesthesia Type:   General    Operative Indications:  Nasal Obstruction  Nasal congestion  Nasal turbinate hypertrophy [J34 3]    Operative Findings:  Bilateral inferior turbinate hypertrophy with the right side being worse than the left side  Complications:   None    Procedure and Technique:  After the patient was allowed to ask any remaining questions in the preoperative area, the patient was escorted to the operating suite by both ENT and anesthesia  There, surgical time-out was performed to ensure the proper patient and procedure  There, general endotracheal anesthesia was induced without incident  Once given the go-ahead by Anesthesia, head of the bed was rotated 100°  The patient's nasal hairs were trimmed with iris scissors  Afrin-soaked pledgets were then placed bilaterally  Patient was then prepped and draped in normal fashion for the above procedures  After time was given, the pledgets were removed  1% lidocaine with epinephrine 1 to [id number] was injected along the bilateral inferior turbinates  An approximate amount of 7 mL was used  Afrin-soaked pledgets were then placed again for additional hemostasis and decongestion  After time was given, of the pledgets were removed  The nasal endoscope was used to visualize both sides    She had bilateral inferior turbinate hypertrophy with the right side being worse than the left side  Using endoscopic guidance, starting on the right side, Bovie cautery at a setting of 15 cut was used to make a mucosal cut at the head of the inferior turbinate and down the inferior portion of the turbinate from anterior to posterior  [place] elevator was then used to raise mucosa off of the turbinate bone  The bone was then taken down with [place] forceps as well as alligator forceps  The remaining mucosa was then submucosally reduced using the [employer] turbinate shaver  The mucosal flaps were then reapproximated  The turbinate was then outfractured with a combination of [place] elevator as well as [place]  Afrin-soaked pledgets were then placed for hemostasis  Attention was then turned to the left side where a similar procedure was performed  After hemostasis was obtained, pledgets were removed  Bacitracin coated [place] splints were placed for healing purposes  This was secured with the 4-0 Prolene suture  The patient was then turned over the Anesthesia allowed to awaken without incident       I was present for the entire procedure    Patient Disposition:  PACU     SIGNATURE: Silvia Castillo MD  DATE: March 22, 2021  TIME: 9:34 AM

## 2021-03-22 NOTE — PERIOPERATIVE NURSING NOTE
Pt still intermittently thrashing arms and legs and moaning  FM intact  VSS  Pt has not opened eyes yet upon command   Will continue to monitor closely

## 2021-03-30 DIAGNOSIS — Z23 ENCOUNTER FOR IMMUNIZATION: ICD-10-CM

## 2021-03-31 ENCOUNTER — TELEMEDICINE (OUTPATIENT)
Dept: BEHAVIORAL/MENTAL HEALTH CLINIC | Facility: CLINIC | Age: 26
End: 2021-03-31
Payer: COMMERCIAL

## 2021-03-31 DIAGNOSIS — F41.1 GENERALIZED ANXIETY DISORDER WITH PANIC ATTACKS: ICD-10-CM

## 2021-03-31 DIAGNOSIS — F31.63 BIPOLAR DISORDER, CURRENT EPISODE MIXED, SEVERE, WITHOUT PSYCHOTIC FEATURES (HCC): ICD-10-CM

## 2021-03-31 DIAGNOSIS — F43.12 CHRONIC POST-TRAUMATIC STRESS DISORDER (PTSD): ICD-10-CM

## 2021-03-31 DIAGNOSIS — F41.0 GENERALIZED ANXIETY DISORDER WITH PANIC ATTACKS: ICD-10-CM

## 2021-03-31 DIAGNOSIS — F42.2 MIXED OBSESSIONAL THOUGHTS AND ACTS: Primary | ICD-10-CM

## 2021-03-31 PROCEDURE — 90834 PSYTX W PT 45 MINUTES: CPT | Performed by: SOCIAL WORKER

## 2021-03-31 NOTE — PSYCH
This note was not shared with the patient due to this is a psychotherapy note    Psychotherapy Provided: Individual Psychotherapy 50 minutes     Length of time in session: 50 minutes, follow up in 1 week    Goals addressed in session: Goal 1 and Goal 2     Pain:      none    0    Current suicide risk : Low     D: Met with Jenny individually  Completed nose surgery - very successful  Discussed frustration with Pharm and receiving meds  Feels 'paranoid' last 5 days - wakes up in a 'panic'  Feels Depakote is in effective - 'I am getting a lot more temple and irritated'- several factors involved- Endocronology states [place] and thyroid can cause drastic mood swings meds don't help  R/o lupus - sister will go with Jessica Kurtz to help with comprehension of  's discussion  Denied SI    A: Jessica Kurtz presented with baseline mood and affect  She continues to have multiple factors that may contribute to above obstacles  P: Continue individual therapy    Behavioral Health Treatment Plan [facility]: Diagnosis and Treatment Plan explained to Preethi St relates understanding diagnosis and is agreeable to Treatment Plan   Yes

## 2021-04-03 ENCOUNTER — IMMUNIZATIONS (OUTPATIENT)
Dept: FAMILY MEDICINE CLINIC | Facility: HOSPITAL | Age: 26
End: 2021-04-03

## 2021-04-03 DIAGNOSIS — Z23 ENCOUNTER FOR IMMUNIZATION: Primary | ICD-10-CM

## 2021-04-03 PROCEDURE — 91300 SARS-COV-2 / COVID-19 MRNA VACCINE (PFIZER-BIONTECH) 30 MCG: CPT

## 2021-04-03 PROCEDURE — 0001A SARS-COV-2 / COVID-19 MRNA VACCINE (PFIZER-BIONTECH) 30 MCG: CPT

## 2021-04-07 ENCOUNTER — TELEMEDICINE (OUTPATIENT)
Dept: BEHAVIORAL/MENTAL HEALTH CLINIC | Facility: CLINIC | Age: 26
End: 2021-04-07
Payer: COMMERCIAL

## 2021-04-07 DIAGNOSIS — F42.2 MIXED OBSESSIONAL THOUGHTS AND ACTS: Primary | ICD-10-CM

## 2021-04-07 DIAGNOSIS — F41.0 GENERALIZED ANXIETY DISORDER WITH PANIC ATTACKS: ICD-10-CM

## 2021-04-07 DIAGNOSIS — F43.12 CHRONIC POST-TRAUMATIC STRESS DISORDER (PTSD): ICD-10-CM

## 2021-04-07 DIAGNOSIS — F41.1 GENERALIZED ANXIETY DISORDER WITH PANIC ATTACKS: ICD-10-CM

## 2021-04-07 DIAGNOSIS — F31.63 BIPOLAR DISORDER, CURRENT EPISODE MIXED, SEVERE, WITHOUT PSYCHOTIC FEATURES (HCC): ICD-10-CM

## 2021-04-07 PROCEDURE — 90834 PSYTX W PT 45 MINUTES: CPT | Performed by: SOCIAL WORKER

## 2021-04-07 NOTE — PSYCH
This note was not shared with the patient due to this is a psychotherapy note    Virtual Regular Visit      Assessment/Plan:    Problem List Items Addressed This Visit        Other    Chronic post-traumatic stress disorder (PTSD)    Generalized anxiety disorder with panic attacks    OCD (obsessive compulsive disorder) - Primary    Bipolar affective disorder ([facility] [address] )               Reason for visit is No chief complaint on file  Encounter provider Claire Hui    Provider located at [address] [place] [id number] [phone]      Recent Visits  Date Type Provider Dept   03/31/21 8700 Nelli Duran recent visits within past 7 days and meeting all other requirements     Today's Visits  Date Type Provider Dept   04/07/21 200 [facility] Therapist [facility]   Showing today's visits and meeting all other requirements     Future Appointments  No visits were found meeting these conditions  Showing future appointments within next 150 days and meeting all other requirements        The patient was identified by name and date of birth  Lia Bell was informed that this is a telemedicine visit and that the visit is being conducted through [email or web address] and patient was informed that this is a secure, HIPAA-compliant platform  She agrees to proceed     My office door was closed  No one else was in the room  She acknowledged consent and understanding of privacy and security of the video platform  The patient has agreed to participate and understands they can discontinue the visit at any time  Patient is aware this is a billable service  Subjective  Lia Bell is a 27 y o  female    D: Met with Martín moses  Session focused upon obstacles in the home with Heather Caal   Processed specific situations Sebas Zimmerman feels may be impeding their support of each other  Feels depression remains however has been feeling tired and achy due to COVID vaccine  School work is behind  Denied SI    A: Sebas Zimmerman presented tired and frustrated with her ongoing struggles  Sebas Zimmerman may need to take more risks and not wait for Gleda Hemp to change  P: Continue Individual therapy      HPI     Past Medical History:   Diagnosis Date    Abdominal pain     Anxiety     Anxiety and depression     COVID-19 12/2020    Depression     Eating disorder     history of anorexia/bulemia [id number]    Fracture of fibula     R Salter I   Lillette Crimes disease     Hashimoto's disease 2/21/2020    Head injury     Nasal congestion     PTSD (post-traumatic stress disorder)     Rectal bleed     Seizures (HCC)        Past Surgical History:   Procedure Laterality Date    KNEE SURGERY Right 07/06/2020    NASAL SEPTOPLASTY W/ TURBINOPLASTY      SINUS SURGERY      TURBINOPLASTY N/A 3/22/2021    Procedure: Cirilo Camargo;  Surgeon: Jey Loya MD;  [facility];  Service: ENT    WISDOM TOOTH EXTRACTION      WRIST SURGERY      left;  Excision of ganglion       Current Outpatient Medications   Medication Sig Dispense Refill    albuterol (PROVENTIL HFA,VENTOLIN HFA) 90 mcg/act inhaler Inhale 1 puff every 6 (six) hours as needed for wheezing or shortness of breath 1 Inhaler 5    Altavera 0 15-30 MG-MCG per tablet TAKE ONE TABLET BY MOUTH EVERY DAY 84 tablet 3    amitriptyline (ELAVIL) 10 mg tablet Take 1 tablet (10 mg total) by mouth daily at bedtime 30 tablet 2    ARIPiprazole (ABILIFY) 2 mg tablet TAKE 1 TABLET BY MOUTH DAILY 30 tablet 2    Blood Glucose Monitoring Suppl (ONETOUCH VERIO) w/Device KIT Use as directed  0    cholecalciferol (VITAMIN D3) 1,000 units tablet Take 5,000 Units by mouth daily       clonazePAM (KlonoPIN) 0 5 mg tablet Take 1 tablet (0 5 mg total) by mouth 2 (two) times a day 30 tablet 2    desvenlafaxine succinate (PRISTIQ) 50 mg 24 hr tablet TAKE 1 TABLET BY MOUTH DAILY 30 tablet 2    divalproex sodium (DEPAKOTE ER) 250 mg 24 hr tablet Take 1 tablet (250 mg total) by mouth daily at bedtime Total dose 750 at bedtime (Patient taking differently: Take 750 mg by mouth daily at bedtime Total dose 750 at bedtime) 30 tablet 2    EPINEPHrine (EPIPEN) 0 3 mg/0 3 mL SOAJ Inject 0 3 mL (0 3 mg total) into a muscle once for 1 dose 0 6 mL 0    glucagon (GLUCAGON EMERGENCY) 1 MG injection Inject 1 mg under the skin once as needed for low blood sugar for up to 2 doses 1 kit 1    insulin aspart (NovoLOG) 100 units/mL injection Use 30 units per day via pump Disp 1 vial per month (Patient taking differently: Use 100units per day via pump Disp 2 vial per month) 30 mL 1    Insulin Infusion Pump KIT 13 mm cannula, 23 inch tubing change site every 3 days      Insulin Pen Needle (BD PEN NEEDLE NASIM U/F) 32G X 4 MM MISC by Does not apply route 4 (four) times a day for 180 days 400 each 3    Multiple Vitamin (DAILY VALUE MULTIVITAMIN) TABS Take by mouth daily       ONETOUCH DELICA LANCETS 44W MISC Patient test 6 times daily 600 each 1    ONETOUCH VERIO test strip Test six times a day 600 each 3     No current facility-administered medications for this visit  Allergies   Allergen Reactions    Iodine - Food Allergy Throat Swelling    Insulin Glargine Other (See Comments)     Burning and redness under skin         I spent 50 minutes directly with the patient during this visit      VIRTUAL VISIT DISCLAIMER    Reji Nohelia acknowledges that she has consented to an online visit or consultation  She understands that the online visit is based solely on information provided by her, and that, in the absence of a face-to-face physical evaluation by the physician, the diagnosis she receives is both limited and provisional in terms of accuracy and completeness   This is not intended to replace a full medical face-to-face evaluation by the physician  Mandi Gillette understands and accepts these terms

## 2021-04-12 ENCOUNTER — TELEPHONE (OUTPATIENT)
Dept: PSYCHIATRY | Facility: CLINIC | Age: 26
End: 2021-04-12

## 2021-04-12 NOTE — TELEPHONE ENCOUNTER
----- Message from Tish Galaviz sent at 4/12/2021  2:25 PM EDT -----  Regarding: cancellation  Gelacio Johnson cancelled her appointment for 4/14 at 3pm  She stated she would not be able to make the appointment

## 2021-04-13 ENCOUNTER — OFFICE VISIT (OUTPATIENT)
Dept: PSYCHIATRY | Facility: CLINIC | Age: 26
End: 2021-04-13
Payer: COMMERCIAL

## 2021-04-13 DIAGNOSIS — F41.0 GENERALIZED ANXIETY DISORDER WITH PANIC ATTACKS: ICD-10-CM

## 2021-04-13 DIAGNOSIS — F41.1 GENERALIZED ANXIETY DISORDER WITH PANIC ATTACKS: ICD-10-CM

## 2021-04-13 DIAGNOSIS — F31.0 BIPOLAR DISORDER, CURRENT EPISODE HYPOMANIC (HCC): ICD-10-CM

## 2021-04-13 DIAGNOSIS — F42.2 MIXED OBSESSIONAL THOUGHTS AND ACTS: ICD-10-CM

## 2021-04-13 DIAGNOSIS — F43.10 PTSD (POST-TRAUMATIC STRESS DISORDER): ICD-10-CM

## 2021-04-13 PROCEDURE — 99213 OFFICE O/P EST LOW 20 MIN: CPT | Performed by: PSYCHIATRY & NEUROLOGY

## 2021-04-13 RX ORDER — DIVALPROEX SODIUM 500 MG/1
500 TABLET, EXTENDED RELEASE ORAL DAILY
Qty: 30 TABLET | Refills: 2 | Status: SHIPPED | OUTPATIENT
Start: 2021-04-13 | End: 2021-04-30 | Stop reason: SDUPTHER

## 2021-04-13 RX ORDER — DIVALPROEX SODIUM 250 MG/1
250 TABLET, EXTENDED RELEASE ORAL
Qty: 30 TABLET | Refills: 2 | Status: SHIPPED | OUTPATIENT
Start: 2021-04-13 | End: 2021-04-30

## 2021-04-13 RX ORDER — DESVENLAFAXINE 50 MG/1
50 TABLET, EXTENDED RELEASE ORAL DAILY
Qty: 30 TABLET | Refills: 2 | Status: SHIPPED | OUTPATIENT
Start: 2021-04-13 | End: 2021-08-02 | Stop reason: SDUPTHER

## 2021-04-13 RX ORDER — ARIPIPRAZOLE 10 MG/1
10 TABLET ORAL DAILY
Qty: 30 TABLET | Refills: 2 | Status: SHIPPED | OUTPATIENT
Start: 2021-04-13 | End: 2021-07-07

## 2021-04-13 RX ORDER — CLONAZEPAM 0.5 MG/1
0.5 TABLET ORAL 2 TIMES DAILY
Qty: 45 TABLET | Refills: 2 | Status: SHIPPED | OUTPATIENT
Start: 2021-04-13 | End: 2021-08-02 | Stop reason: SDUPTHER

## 2021-04-13 NOTE — PSYCH
Subjective: Medication Management      Patient ID: Cynthia Gomez is a 27 y o  female  HPI ROS Appetite Changes and Sleep: normal appetite, normal energy level, no weight change and normal number of sleep hours   Carey Gardiner stated she continues to have mixed bipolar symptoms, she is still irritable,her mood is labile, she is anxious, at times she feels depressed and it has been difficult to function and do her schoolwork due to her symptoms  She does find Depakote helpful and she did stopped her impulsive shopping  But her mood her mood is still up and down and that causes difficulties in her ability to focus in her school work  She would agree to have a dose increase on Abilify dose to target mixed symptoms of Bipolar disorder  We received pharmacy notification of drug interaction between Elavil (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) and Depakote  The interaction is moderate and both providers are aware and will monitor for side effects  Patient agrees to continue Depakote for mood stabilization  Will obtain blood level a well  Agrees to schedule follow up in 3 months or sooner if needed  Review Of Systems:     Mood Anxiety, Depression and Emotional Lability   Behavior Impulsive Behavior   Thought Content Disturbing Thoughts, Feelings   General Emotional Problems, Sleep Disturbances and Decreased Functioning   Personality Normal   Other Psych Symptoms Normal   Constitutional Negative   ENT Negative   Cardiovascular Negative   Respiratory Negative   Gastrointestinal Negative   Genitourinary Negative   Musculoskeletal Negative   Integumentary Negative   Neurological Negative   Endocrine Normal    Other Symptoms Normal              Laboratory Results: No results found for this or any previous visit      Substance Abuse History:  Social History     Substance and Sexual Activity   Drug Use No       Family Psychiatric History:   Family History   Problem Relation Age of Onset    Hypertension Mother    Anne Clunes Migraines Mother Headache    Diabetes type II Mother     Varicose Veins Mother     Hyperlipidemia Mother    [facility] Diabetes Mother    [facility] Arthritis Mother     Depression Mother     Cholelithiasis Father     Hypertension Father     Sarcoidosis Father         Liver    Hyperlipidemia Father     Diabetes Father     Coronary artery disease Father     Nephrolithiasis Father     Cirrhosis Father     Alcohol abuse Father     Thyroid disease Sister     Cancer Family     Diabetes Family     Hypertension Family     Alcohol abuse Brother        The following portions of the patient's history were reviewed and updated as appropriate: allergies, current medications, past family history, past medical history, past social history, past surgical history and problem list     Social History     Socioeconomic History    Marital status: Single     Spouse name: Not on file    Number of children: 0    Years of education: Not on file    Highest education level: Associate degree: academic program   Occupational History    Occupation: unemployed   Social Needs    Financial resource strain: Somewhat hard    Food insecurity     Worry: Not on file     Inability: Not on file    Transportation needs     Medical: Not on file     Non-medical: Not on file   Tobacco Use    Smoking status: Never Smoker    Smokeless tobacco: Never Used    Tobacco comment: Tobacco smoke exposure (Father smokes cigars)   Substance and Sexual Activity    Alcohol use: Not Currently     Frequency: Monthly or less     Drinks per session: 1 or 2     Binge frequency: Never     Comment: socially      Drug use: No    Sexual activity: Not Currently     Partners: Male     Birth control/protection: Condom Male, OCP   Lifestyle    Physical activity     Days per week: 7 days     Minutes per session: 10 min    Stress: Rather much   Relationships    Social connections     Talks on phone: More than three times a week     Gets together: Not on file     Attends Alevism service: Not on file     Active member of club or organization: No     Attends meetings of clubs or organizations: Never     Relationship status: Never     Intimate partner violence     Fear of current or ex partner: No     Emotionally abused: No     Physically abused: No     Forced sexual activity: No   Other Topics Concern    Not on file   Social History Narrative    Student at [employer] a poor diet; low in vegetables, high in sweets    Dental care, regularly    Lives with parents    Sleeps 8-10 hours a day     Social History     Social History Narrative    Student at [employer] a poor diet; low in vegetables, high in sweets    Dental care, regularly    Lives with parents    Sleeps 8-10 hours a day       Objective:       Mental status:  Appearance calm and cooperative , adequate hygiene and grooming and good eye contact    Mood dysphoric   Affect affect was constricted   Speech a normal rate and fluent   Thought Processes coherent/organized and normal thought processes   Hallucinations no hallucinations present    Thought Content no delusions   Abnormal Thoughts no suicidal thoughts  and no homicidal thoughts    Orientation  oriented to person and place and time   Remote Memory short term memory intact and long term memory intact   Attention Span concentration intact   Intellect Appears to be of Average Intelligence   Insight Limited insight   Judgement judgment was limited   Muscle Strength Muscle strength and tone were normal and Normal gait    Language no difficulty naming common objects and no difficulty repeating a phrase    Fund of Knowledge displays adequate knowledge of current events               Assessment/Plan:       Diagnoses and all orders for this visit:    Severe episode of recurrent major depressive disorder, without psychotic features ([facility])  -     desvenlafaxine succinate (PRISTIQ) 50 mg 24 hr tablet;  Take 1 tablet (50 mg total) by mouth daily    PTSD (post-traumatic stress disorder)  -     desvenlafaxine succinate (PRISTIQ) 50 mg 24 hr tablet; Take 1 tablet (50 mg total) by mouth daily    Mixed obsessional thoughts and acts  -     desvenlafaxine succinate (PRISTIQ) 50 mg 24 hr tablet; Take 1 tablet (50 mg total) by mouth daily    Generalized anxiety disorder with panic attacks  -     desvenlafaxine succinate (PRISTIQ) 50 mg 24 hr tablet; Take 1 tablet (50 mg total) by mouth daily  -     clonazePAM (KlonoPIN) 0 5 mg tablet; Take 1 tablet (0 5 mg total) by mouth 2 (two) times a day    Bipolar disorder, current episode hypomanic (HCC)  -     divalproex sodium (DEPAKOTE ER) 250 mg 24 hr tablet; Take 1 tablet (250 mg total) by mouth daily at bedtime Total dose 750 at bedtime  -     divalproex sodium (DEPAKOTE ER) 500 mg 24 hr tablet; Take 1 tablet (500 mg total) by mouth daily  -     Valproic acid level, total; Future            Treatment Recommendations- Risks Benefits      Immediate Medical/Psychiatric/Psychotherapy Treatments and Any Precautions: increase Abilify to 10 mg po daily for mixed symptoms of Bipolar disorder     Risks, Benefits And Possible Side Effects Of Medications:  {PSYCH RISK, BENEFITS AND POSSIBLE SIDE EFFECTS (Optional):30522    Controlled Medication Discussion: Discussed with patient Black Box warning on concurrent use of benzodiazepines and opioid medications including sedation, respiratory depression, coma and death  Patient understands the risk of treatment with benzodiazepines in addition to opioids and wants to continue taking those medications  , Discussed with patient the risks of sedation, respiratory depression, impairment of ability to drive and potential for abuse and addiction related to treatment with benzodiazepine medications  The patient understands risk of treatment with benzodiazepine medications, agrees to not drive if feels impaired and agrees to take medications as prescribed   and The patient has been filling controlled prescriptions on time as prescribed to [facility] program       Psychotherapy Provided:     Individual psychotherapy provided: Yes  Counseling was provided during the session today for 16 minutes  Medications, treatment progress and treatment plan reviewed with Chichi   Medication changes discussed with Chichi   Medication education provided to Chichi Otero  Goals discussed during in session: improve control of mood stability  Recent stressor including school stress, everyday stressors, ongoing anxiety and chronic mental illness discussed with Chichi   Coping strategies including compliance with medications, contacting a therapist, deep/slow breathing, eliminating avoidance, getting into a good routine, increasing motivation, maintain healthy diet, maintain heathy sleeping hygiene and maintain positive attitude reviewed with iVengo   Importance of medication and treatment compliance reviewed with iVengo   Educated on importance of medication and treatment compliance  Discussed with Chichi  acceptance of mental illness diagnosis and need for ongoing psychiatric treatment  Supportive therapy provided

## 2021-04-15 ENCOUNTER — TELEPHONE (OUTPATIENT)
Dept: PSYCHIATRY | Facility: CLINIC | Age: 26
End: 2021-04-15

## 2021-04-15 NOTE — TELEPHONE ENCOUNTER
Received notification in Cover my meds that a P A  for Desvenlafaxine has been initiated by the pharmacy       Completed and sen P A  for Desvenlafaxine ER 50 mg tabs via fax to [facility]     Will await outcome

## 2021-04-21 ENCOUNTER — TELEMEDICINE (OUTPATIENT)
Dept: BEHAVIORAL/MENTAL HEALTH CLINIC | Facility: CLINIC | Age: 26
End: 2021-04-21
Payer: COMMERCIAL

## 2021-04-21 DIAGNOSIS — F41.0 GENERALIZED ANXIETY DISORDER WITH PANIC ATTACKS: Primary | ICD-10-CM

## 2021-04-21 DIAGNOSIS — F41.1 GENERALIZED ANXIETY DISORDER WITH PANIC ATTACKS: Primary | ICD-10-CM

## 2021-04-21 DIAGNOSIS — F31.63 BIPOLAR DISORDER, CURRENT EPISODE MIXED, SEVERE, WITHOUT PSYCHOTIC FEATURES (HCC): ICD-10-CM

## 2021-04-21 DIAGNOSIS — F43.12 CHRONIC POST-TRAUMATIC STRESS DISORDER (PTSD): ICD-10-CM

## 2021-04-21 PROCEDURE — 90834 PSYTX W PT 45 MINUTES: CPT | Performed by: SOCIAL WORKER

## 2021-04-21 NOTE — PSYCH
This note was not shared with the patient due to this is a psychotherapy note    Virtual Regular Visit      Assessment/Plan:    Problem List Items Addressed This Visit        Other    Chronic post-traumatic stress disorder (PTSD)    Generalized anxiety disorder with panic attacks - Primary    Bipolar affective disorder ([facility] [address] )          Goals addressed in session: Goal 1 and Goal 2          Reason for visit is No chief complaint on file  Encounter provider [employer] Artsa Globevestor    Provider located at [address] [place] [address] Chilo Mendez [place] [id number] [phone]      Recent Visits  No visits were found meeting these conditions  Showing recent visits within past 7 days and meeting all other requirements     Today's Visits  Date Type Provider Dept   04/21/21 Telemedicine [facility] Therapist Ivan   Showing today's visits and meeting all other requirements     Future Appointments  No visits were found meeting these conditions  Showing future appointments within next 150 days and meeting all other requirements        The patient was identified by name and date of birth  Doris Dick was informed that this is a telemedicine visit and that the visit is being conducted through [employer] and patient was informed that this is a secure, HIPAA-compliant platform  She agrees to proceed     My office door was closed  No one else was in the room  She acknowledged consent and understanding of privacy and security of the video platform  The patient has agreed to participate and understands they can discontinue the visit at any time  Patient is aware this is a billable service  Subjective  Doris Dick is a 27 y o  female    D: Met with Damion Mao individually  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 semester with one class- successful    Ate something that didn't agree with her on Friday - stopped meds since Sunday - cautioned with Depakote  Plans to resume today (just got up)  Now able to eat toast and more substance  Getting acne for the first time  Feels strongly thyroid and diabetes 'have been out of wack'  Going to Endo next week  Denied SI    A: Pete Almendarez presented more alert despite not being on meds  Prior to stomach virus- completed college responsibilities, showering - progress  P: Continue individual therapy      HPI     Past Medical History:   Diagnosis Date    Abdominal pain     Anxiety     Anxiety and depression     COVID-19 12/2020    Depression     Eating disorder     history of anorexia/bulemia [id number]    Fracture of fibula     R Salter I   Pheobe Rho disease     Hashimoto's disease 2/21/2020    Head injury     Nasal congestion     PTSD (post-traumatic stress disorder)     Rectal bleed     Seizures (HCC)        Past Surgical History:   Procedure Laterality Date    KNEE SURGERY Right 07/06/2020    NASAL SEPTOPLASTY W/ TURBINOPLASTY      SINUS SURGERY      TURBINOPLASTY N/A 3/22/2021    Procedure: TURBINOPLASTY;  Surgeon: Tara De La Vega MD;  [facility];  Service: ENT    WISDOM TOOTH EXTRACTION      WRIST SURGERY      left;  Excision of ganglion       Current Outpatient Medications   Medication Sig Dispense Refill    albuterol (PROVENTIL HFA,VENTOLIN HFA) 90 mcg/act inhaler Inhale 1 puff every 6 (six) hours as needed for wheezing or shortness of breath 1 Inhaler 5    Altavera 0 15-30 MG-MCG per tablet TAKE ONE TABLET BY MOUTH EVERY DAY 84 tablet 3    amitriptyline (ELAVIL) 10 mg tablet Take 1 tablet (10 mg total) by mouth daily at bedtime 30 tablet 2    ARIPiprazole (ABILIFY) 10 mg tablet Take 1 tablet (10 mg total) by mouth daily 30 tablet 2    Blood Glucose Monitoring Suppl (ONETOUCH VERIO) w/Device KIT Use as directed  0    cholecalciferol (VITAMIN D3) 1,000 units tablet Take 5,000 Units by mouth daily       clonazePAM (KlonoPIN) 0 5 mg tablet Take 1 tablet (0 5 mg total) by mouth 2 (two) times a day 45 tablet 2    desvenlafaxine succinate (PRISTIQ) 50 mg 24 hr tablet Take 1 tablet (50 mg total) by mouth daily 30 tablet 2    divalproex sodium (DEPAKOTE ER) 250 mg 24 hr tablet Take 1 tablet (250 mg total) by mouth daily at bedtime Total dose 750 at bedtime 30 tablet 2    divalproex sodium (DEPAKOTE ER) 500 mg 24 hr tablet Take 1 tablet (500 mg total) by mouth daily 30 tablet 2    EPINEPHrine (EPIPEN) 0 3 mg/0 3 mL SOAJ Inject 0 3 mL (0 3 mg total) into a muscle once for 1 dose 0 6 mL 0    glucagon (GLUCAGON EMERGENCY) 1 MG injection Inject 1 mg under the skin once as needed for low blood sugar for up to 2 doses 1 kit 1    insulin aspart (NovoLOG) 100 units/mL injection Use 30 units per day via pump Disp 1 vial per month (Patient taking differently: Use 100units per day via pump Disp 2 vial per month) 30 mL 1    Insulin Infusion Pump KIT 13 mm cannula, 23 inch tubing change site every 3 days      Insulin Pen Needle (BD PEN NEEDLE NASIM U/F) 32G X 4 MM MISC by Does not apply route 4 (four) times a day for 180 days 400 each 3    Multiple Vitamin (DAILY VALUE MULTIVITAMIN) TABS Take by mouth daily       ONETOUCH DELICA LANCETS 85P MISC Patient test 6 times daily 600 each 1    ONETOUCH VERIO test strip Test six times a day 600 each 3     No current facility-administered medications for this visit  Allergies   Allergen Reactions    Iodine - Food Allergy Throat Swelling    Insulin Glargine Other (See Comments)     Burning and redness under skin           I spent 50 minutes directly with the patient during this visit      VIRTUAL VISIT DISCLAIMER    Sharonda Forman acknowledges that she has consented to an online visit or consultation   She understands that the online visit is based solely on information provided by her, and that, in the absence of a face-to-face physical evaluation by the physician, the diagnosis she receives is both limited and provisional in terms of accuracy and completeness  This is not intended to replace a full medical face-to-face evaluation by the physician  Ashly Trammell understands and accepts these terms

## 2021-04-24 ENCOUNTER — IMMUNIZATIONS (OUTPATIENT)
Dept: FAMILY MEDICINE CLINIC | Facility: HOSPITAL | Age: 26
End: 2021-04-24

## 2021-04-24 DIAGNOSIS — Z23 ENCOUNTER FOR IMMUNIZATION: Primary | ICD-10-CM

## 2021-04-24 PROCEDURE — 91300 SARS-COV-2 / COVID-19 MRNA VACCINE (PFIZER-BIONTECH) 30 MCG: CPT

## 2021-04-24 PROCEDURE — 0002A SARS-COV-2 / COVID-19 MRNA VACCINE (PFIZER-BIONTECH) 30 MCG: CPT

## 2021-04-28 ENCOUNTER — TELEMEDICINE (OUTPATIENT)
Dept: BEHAVIORAL/MENTAL HEALTH CLINIC | Facility: CLINIC | Age: 26
End: 2021-04-28
Payer: COMMERCIAL

## 2021-04-28 DIAGNOSIS — F42.2 MIXED OBSESSIONAL THOUGHTS AND ACTS: ICD-10-CM

## 2021-04-28 DIAGNOSIS — F41.1 GENERALIZED ANXIETY DISORDER WITH PANIC ATTACKS: Primary | ICD-10-CM

## 2021-04-28 DIAGNOSIS — F41.0 GENERALIZED ANXIETY DISORDER WITH PANIC ATTACKS: Primary | ICD-10-CM

## 2021-04-28 DIAGNOSIS — F31.63 BIPOLAR DISORDER, CURRENT EPISODE MIXED, SEVERE, WITHOUT PSYCHOTIC FEATURES (HCC): ICD-10-CM

## 2021-04-28 DIAGNOSIS — F43.12 CHRONIC POST-TRAUMATIC STRESS DISORDER (PTSD): ICD-10-CM

## 2021-04-28 PROCEDURE — 90834 PSYTX W PT 45 MINUTES: CPT | Performed by: SOCIAL WORKER

## 2021-04-28 NOTE — PSYCH
This note was not shared with the patient due to this is a psychotherapy note      Virtual Regular Visit      Assessment/Plan:    Problem List Items Addressed This Visit        Other    Chronic post-traumatic stress disorder (PTSD)    Generalized anxiety disorder with panic attacks - Primary    OCD (obsessive compulsive disorder)    Bipolar affective disorder ([facility] [address] )          Goals addressed in session: Goal 1, Goal 2 and Goal 3           Reason for visit is No chief complaint on file  Encounter provider Zulma Ramsay    Provider located at [address] [place] [id number] [phone]      Recent Visits  Date Type Provider Dept   04/21/21 200 [facility] Therapist Ivan   Showing recent visits within past 7 days and meeting all other requirements     Today's Visits  Date Type Provider Dept   04/28/21 200 [facility] Therapist Ivan   Showing today's visits and meeting all other requirements     Future Appointments  No visits were found meeting these conditions  Showing future appointments within next 150 days and meeting all other requirements        The patient was identified by name and date of birth  Tosin Mcdonald was informed that this is a telemedicine visit and that the visit is being conducted through yoonew and patient was informed that this is a secure, HIPAA-compliant platform  She agrees to proceed     My office door was closed  No one else was in the room  She acknowledged consent and understanding of privacy and security of the video platform  The patient has agreed to participate and understands they can discontinue the visit at any time  Patient is aware this is a billable service  Subjective  Tosin Mcdonald is a 27 y o  female    D: Met with BODØ individually    BODØ discussed her second COVID shot - experienced reactions of fever, hallucinations and delirium for 24hrs  Has appointment with new Dr Julieta Abad- scared though eager to discuss symptoms as she specializes in Thyroid and Hormone Disorders  Also received notice from the disability appeals office- they disagreed with 's decision - wants another hearing - Beltran Duran can also include new Dx and symptoms since last hearing  Have to move due to finances- putting offers on houses as per parents  Denied SI    A: Beltran Duran presented fatigued, though hopeful with upcoming appointment and hearing  P: Continue individual therapy      HPI     Past Medical History:   Diagnosis Date    Abdominal pain     Anxiety     Anxiety and depression     COVID-19 12/2020    Depression     Eating disorder     history of anorexia/bulemia [id number]    Fracture of fibula     R Salter I   Darolyn Settles disease     Hashimoto's disease 2/21/2020    Head injury     Nasal congestion     PTSD (post-traumatic stress disorder)     Rectal bleed     Seizures (HCC)        Past Surgical History:   Procedure Laterality Date    KNEE SURGERY Right 07/06/2020    NASAL SEPTOPLASTY W/ TURBINOPLASTY      SINUS SURGERY      TURBINOPLASTY N/A 3/22/2021    Procedure: TURBINOPLASTY;  Surgeon: Renuka Gupta MD;  Location:  MAIN OR;  Service: ENT    WISDOM TOOTH EXTRACTION      WRIST SURGERY      left;  Excision of ganglion       Current Outpatient Medications   Medication Sig Dispense Refill    albuterol (PROVENTIL HFA,VENTOLIN HFA) 90 mcg/act inhaler Inhale 1 puff every 6 (six) hours as needed for wheezing or shortness of breath 1 Inhaler 5    Altavera 0 15-30 MG-MCG per tablet TAKE ONE TABLET BY MOUTH EVERY DAY 84 tablet 3    amitriptyline (ELAVIL) 10 mg tablet Take 1 tablet (10 mg total) by mouth daily at bedtime 30 tablet 2    ARIPiprazole (ABILIFY) 10 mg tablet Take 1 tablet (10 mg total) by mouth daily 30 tablet 2    Blood Glucose Monitoring Suppl ([employer]) w/Device KIT Use as directed  0    cholecalciferol (VITAMIN D3) 1,000 units tablet Take 5,000 Units by mouth daily       clonazePAM (KlonoPIN) 0 5 mg tablet Take 1 tablet (0 5 mg total) by mouth 2 (two) times a day 45 tablet 2    desvenlafaxine succinate (PRISTIQ) 50 mg 24 hr tablet Take 1 tablet (50 mg total) by mouth daily 30 tablet 2    divalproex sodium (DEPAKOTE ER) 250 mg 24 hr tablet Take 1 tablet (250 mg total) by mouth daily at bedtime Total dose 750 at bedtime 30 tablet 2    divalproex sodium (DEPAKOTE ER) 500 mg 24 hr tablet Take 1 tablet (500 mg total) by mouth daily 30 tablet 2    EPINEPHrine (EPIPEN) 0 3 mg/0 3 mL SOAJ Inject 0 3 mL (0 3 mg total) into a muscle once for 1 dose 0 6 mL 0    glucagon (GLUCAGON EMERGENCY) 1 MG injection Inject 1 mg under the skin once as needed for low blood sugar for up to 2 doses 1 kit 1    insulin aspart (NovoLOG) 100 units/mL injection Use 30 units per day via pump Disp 1 vial per month (Patient taking differently: Use 100units per day via pump Disp 2 vial per month) 30 mL 1    Insulin Infusion Pump KIT 13 mm cannula, 23 inch tubing change site every 3 days      Insulin Pen Needle (BD PEN NEEDLE NASIM U/F) 32G X 4 MM MISC by Does not apply route 4 (four) times a day for 180 days 400 each 3    Multiple Vitamin (DAILY VALUE MULTIVITAMIN) TABS Take by mouth daily       ONETOUCH DELICA LANCETS 07F MISC Patient test 6 times daily 600 each 1    ONETOUCH VERIO test strip Test six times a day 600 each 3     No current facility-administered medications for this visit  Allergies   Allergen Reactions    Iodine - Food Allergy Throat Swelling    Insulin Glargine Other (See Comments)     Burning and redness under skin          I spent 50 minutes directly with the patient during this visit      VIRTUAL VISIT DISCLAIMER    Reji Nohelia acknowledges that she has consented to an online visit or consultation   She understands that the online visit is based solely on information provided by her, and that, in the absence of a face-to-face physical evaluation by the physician, the diagnosis she receives is both limited and provisional in terms of accuracy and completeness  This is not intended to replace a full medical face-to-face evaluation by the physician  Dionne Baker understands and accepts these terms

## 2021-04-29 ENCOUNTER — TELEPHONE (OUTPATIENT)
Dept: NEUROLOGY | Facility: CLINIC | Age: 26
End: 2021-04-29

## 2021-04-29 ENCOUNTER — OFFICE VISIT (OUTPATIENT)
Dept: NEUROLOGY | Facility: CLINIC | Age: 26
End: 2021-04-29
Payer: COMMERCIAL

## 2021-04-29 VITALS
WEIGHT: 156 LBS | HEART RATE: 91 BPM | SYSTOLIC BLOOD PRESSURE: 114 MMHG | DIASTOLIC BLOOD PRESSURE: 77 MMHG | HEIGHT: 61 IN | RESPIRATION RATE: 16 BRPM | BODY MASS INDEX: 29.45 KG/M2

## 2021-04-29 DIAGNOSIS — R76.8 POSITIVE ANA (ANTINUCLEAR ANTIBODY): ICD-10-CM

## 2021-04-29 DIAGNOSIS — F41.0 GENERALIZED ANXIETY DISORDER WITH PANIC ATTACKS: ICD-10-CM

## 2021-04-29 DIAGNOSIS — F41.1 GENERALIZED ANXIETY DISORDER WITH PANIC ATTACKS: ICD-10-CM

## 2021-04-29 DIAGNOSIS — F09 COGNITIVE DYSFUNCTION: Primary | ICD-10-CM

## 2021-04-29 DIAGNOSIS — M62.838 MUSCLE SPASMS OF BOTH LOWER EXTREMITIES: ICD-10-CM

## 2021-04-29 DIAGNOSIS — R76.8 RHEUMATOID FACTOR POSITIVE: ICD-10-CM

## 2021-04-29 DIAGNOSIS — R20.2 PARESTHESIA OF LEFT LEG: ICD-10-CM

## 2021-04-29 PROCEDURE — 99215 OFFICE O/P EST HI 40 MIN: CPT | Performed by: PSYCHIATRY & NEUROLOGY

## 2021-04-29 PROCEDURE — 3008F BODY MASS INDEX DOCD: CPT | Performed by: PSYCHIATRY & NEUROLOGY

## 2021-04-29 PROCEDURE — 1036F TOBACCO NON-USER: CPT | Performed by: PSYCHIATRY & NEUROLOGY

## 2021-04-29 RX ORDER — CHOLECALCIFEROL (VITAMIN D3) 125 MCG
5000 CAPSULE ORAL DAILY
COMMUNITY
End: 2021-11-02

## 2021-04-29 NOTE — PROGRESS NOTES
[facility]  PATIENT:  Carly Wang  MRN:  [id number]  :  1995  DATE OF SERVICE:  2021    Assessment/Plan:           Problem List Items Addressed This Visit        Other    Generalized anxiety disorder with panic attacks    Relevant Orders    Ambulatory referral to Neuropsychology    Paresthesia of left leg    Positive SOHAM (antinuclear antibody)    Rheumatoid factor positive      Other Visit Diagnoses     Cognitive dysfunction    -  Primary    Relevant Orders    Ambulatory referral to Neuropsychology    Muscle spasms of both lower extremities        Relevant Medications    magnesium oxide (MAG-OX) 400 mg            Ms Susanna Jaramillo presented to [facility] multiple [address] for follow-up on sensory dysfunction in her legs and fatigue with diffuse weakness  Since last office visit patient does not believe she is getting worse was stable shoulder shrug for findings has been discussed  Patient continue describing heaviness in her muscles, as she is starting workup for Cushing syndrome by endocrinology team   - no MS/CNS demyelination has been appreciated ion on MRIs of the brain, cervical and upper thoracic spine; no other test required; EMG were completed with no myopathy/radiculopathy/polyneuropathy described; Myasthenia gravis work up back and negative  - SOHAM and RA positive with no RA diagnosis established  patient has known Hashimoto's thyroiditis and Type I DM  - neuropsychology test was advised - referral is available, patient is to follow with Neurology after she completed the test    - patient was offered magnesium oxide for intermittent muscle spasm in lower extremity at night time  No new focal neurological deficit noted on patient exam today patient ambulates with no assistive devices  Subjective:  Fatigue, generalized body weakness, cognitive challenges  HPI  Mrs Rodrigues is a 28 yo female who was referred to [address] [place] [address] for evaluation  Initial evaluation was provided by Dr Mattie Mobley for concussion/syncope/muscle weakness and neuropathy  Jessien had reported numbness since 2016, with EMG/NCS completed with no pathology reported in bilateral lower extremities  Patient had concussion in 2016, with difficulties at school started at that time  Thyroid dysfunction has been reported and concerning as her TSH is up and down   Patient reports no vision loss, but difficulties with memory described; weakness has been reported in right upper extremity and bilateral lower extremities; Left hand weakness has been ongoing for some time now and started after ganglion cyst removal   Functional stutter has been noted on and off through this encounter  Black spots in her peripheral vision; Patient reported left-sided facial weakness, no residual dysfunction has been noted today  Patient will be completing her college wit one semester left; Patient has learning difficulties; Memory complaints has been reported  Patient had known Hashimoto's thyroiditis with type 1 diabetes for last 2 years with memory challenges and learning difficulties reported  Patient had intermittent functional stuttering during this visit, non concerning  We personally reviewed brain imaging, we agreed patient has periventricular demyelination  Occipital region,  With no other demyelinating plaques has been appreciated and her imaging has been stable since 2016  we extensively discussed nature of autoimmune disorders with her imaging findings can be seen with Hashimoto encephalopathy; At the same time we agreed patient will benefit from additional serologic workup including several rheumatological conditions as well as myasthenia gravis and evaluation for pernicious anemia         Patient may benefit from formal neuropsychological evaluation, considering she has learning difficulties    Patient is to continue having schedule sessions with Psychiatry team as initially advised      MRI C-spine 2/2021: There is nonspecific straightening/mild reversal of the cervical lordosis without subluxation  2  There is no focal disk herniation, central canal or neural foraminal narrowing  3   No cord signal abnormality  No abnormal enhancement  No MR evidence of demyelinating disease      We also ruled out another neurologic condition - myasthenia gravis and Lambert-eaton syndrome;  Peripheral nerve evaluation with EMG/NCS completed in October 2020, with no peripheral damage of her nerve appreciated as well, no signs of myopathy, based on her report      Patient is to follow with primary care team and request referral to be further evaluated by rheumatology as she was noted positive SOHAM and Rheumatoid factor  The following portions of the patient's history were reviewed and updated as appropriate:   She  has a past medical history of Abdominal pain, Anxiety, Anxiety and depression, COVID-19 (12/2020), Depression, Eating disorder, Fracture of fibula, Gilbert disease, Hashimoto's disease (2/21/2020), Head injury, Nasal congestion, PTSD (post-traumatic stress disorder), Rectal bleed, and Seizures ([facility] [address] )    She   Patient Active Problem List    Diagnosis Date Noted    Rheumatoid factor positive 04/29/2021    History of anesthesia complications 19/71/8885    Seizures ([facility])     Positive SOHAM (antinuclear antibody) 03/09/2021    Weakness generalized 02/11/2021    Low serum vitamin B12 02/11/2021    Arthralgia 02/11/2021    Acute non-recurrent sinusitis 02/09/2021    Bipolar affective disorder ([facility] [address] ) 01/13/2021    Cough 12/31/2020    Vaginal discharge 12/01/2020    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    Yeast infection 12/01/2020    Vulvar cellulitis 11/04/2020    Vulvovaginitis 11/03/2020    Syncope     Chronic back pain 10/16/2020    Insulin pump status 09/30/2020    Muscle weakness 09/03/2020    Word finding difficulty 09/03/2020    Neuropathy 06/23/2020    Polyarthritis 06/23/2020    Secondary amenorrhea 06/23/2020    Vitamin D deficiency 06/19/2020    Wheezing 04/21/2020    Paresthesia of left leg 04/21/2020    Hashimoto's disease 02/21/2020    Dysuria 02/21/2020    Flank pain 02/21/2020    Amenorrhea 02/21/2020    Visual changes 01/21/2020    Chronic pain of right knee 10/15/2019    Chronic abdominal pain 10/15/2019    Blood in the stool 10/15/2019    OCD (obsessive compulsive disorder) 09/26/2019    Activity intolerance 09/10/2019    Chest pain 09/10/2019    Chronic post-traumatic stress disorder (PTSD) 08/12/2019    Generalized anxiety disorder with panic attacks 08/12/2019    Nausea 08/06/2019    Agitation 08/06/2019    Concussion without loss of consciousness 07/02/2019    Urinary frequency 06/06/2019    Hypoglycemia 06/05/2019    Abnormal stools 02/07/2019    Type 1 diabetes mellitus with hyperglycemia ([facility] [address] ) 01/29/2019    Hypokalemia 01/25/2019    Type 1 diabetes mellitus without complication ([facility] [address] ) 58/13/5292    Abnormal liver function test 12/13/2018    Chronic RUQ pain 12/13/2018    Chronic fatigue and malaise 12/13/2018    Patellofemoral pain syndrome of right knee 09/26/2018     She  has a past surgical history that includes Wrist surgery; [place] tooth extraction; Nasal septoplasty w/ turbinoplasty; Knee surgery (Right, 07/06/2020); Sinus surgery; and Turbinoplasty (N/A, 3/22/2021)  Her family history includes Alcohol abuse in her brother and father; Arthritis in her mother; Cancer in her family; Cholelithiasis in her father; Cirrhosis in her father; Coronary artery disease in her father; Depression in her mother; Diabetes in her family, father, and mother; Diabetes type II in her mother; Hyperlipidemia in her father and mother; Hypertension in her family, father, and mother; Migraines in her mother; Nephrolithiasis in her father; Sarcoidosis in her father; Thyroid disease in her sister; Varicose Veins in her mother    She  reports that she has never smoked  She has never used smokeless tobacco  She reports that she does not drink alcohol or use drugs    Current Outpatient Medications   Medication Sig Dispense Refill    albuterol (PROVENTIL HFA,VENTOLIN HFA) 90 mcg/act inhaler Inhale 1 puff every 6 (six) hours as needed for wheezing or shortness of breath 1 Inhaler 5    Altavera 0 15-30 MG-MCG per tablet TAKE ONE TABLET BY MOUTH EVERY DAY 84 tablet 3    amitriptyline (ELAVIL) 10 mg tablet Take 1 tablet (10 mg total) by mouth daily at bedtime 30 tablet 2    ARIPiprazole (ABILIFY) 10 mg tablet Take 1 tablet (10 mg total) by mouth daily 30 tablet 2    Blood Glucose Monitoring Suppl (ONETOUCH VERIO) w/Device KIT Use as directed  0    Cholecalciferol (Vitamin D3) 1 25 MG (45507 UT) CAPS Take 50,000 Units by mouth once a week       Cholecalciferol (Vitamin D3) 50 MCG (2000 UT) TABS Take 2,000 Units by mouth daily 6 days a week      clonazePAM (KlonoPIN) 0 5 mg tablet Take 1 tablet (0 5 mg total) by mouth 2 (two) times a day 45 tablet 2    desvenlafaxine succinate (PRISTIQ) 50 mg 24 hr tablet Take 1 tablet (50 mg total) by mouth daily 30 tablet 2    divalproex sodium (DEPAKOTE ER) 250 mg 24 hr tablet Take 1 tablet (250 mg total) by mouth daily at bedtime Total dose 750 at bedtime 30 tablet 2    divalproex sodium (DEPAKOTE ER) 500 mg 24 hr tablet Take 1 tablet (500 mg total) by mouth daily 30 tablet 2    EPINEPHrine (EPIPEN) 0 3 mg/0 3 mL SOAJ Inject 0 3 mL (0 3 mg total) into a muscle once for 1 dose 0 6 mL 0    glucagon (GLUCAGON EMERGENCY) 1 MG injection Inject 1 mg under the skin once as needed for low blood sugar for up to 2 doses 1 kit 1    insulin aspart (NovoLOG) 100 units/mL injection Use 30 units per day via pump Disp 1 vial per month (Patient taking differently: Use 100units per day via pump Disp 2 vial per month) 30 mL 1    Insulin Infusion Pump KIT 13 mm cannula, 23 inch tubing change site every 3 days      Insulin Pen Needle (BD PEN NEEDLE NASIM U/F) 32G X 4 MM MISC by Does not apply route 4 (four) times a day for 180 days 400 each 3    Multiple Vitamin (DAILY VALUE MULTIVITAMIN) TABS Take by mouth daily       ONETOUCH DELICA LANCETS 84U MISC Patient test 6 times daily 600 each 1    ONETOUCH VERIO test strip Test six times a day 600 each 3    magnesium oxide (MAG-OX) 400 mg Take 1 tablet (400 mg total) by mouth daily 90 tablet 0     No current facility-administered medications for this visit        Current Outpatient Medications on File Prior to Visit   Medication Sig    albuterol (PROVENTIL HFA,VENTOLIN HFA) 90 mcg/act inhaler Inhale 1 puff every 6 (six) hours as needed for wheezing or shortness of breath    Altavera 0 15-30 MG-MCG per tablet TAKE ONE TABLET BY MOUTH EVERY DAY    amitriptyline (ELAVIL) 10 mg tablet Take 1 tablet (10 mg total) by mouth daily at bedtime    ARIPiprazole (ABILIFY) 10 mg tablet Take 1 tablet (10 mg total) by mouth daily    Blood Glucose Monitoring Suppl (ONETOUCH VERIO) w/Device KIT Use as directed    Cholecalciferol (Vitamin D3) 1 25 MG (15577 UT) CAPS Take 50,000 Units by mouth once a week     Cholecalciferol (Vitamin D3) 50 MCG (2000 UT) TABS Take 2,000 Units by mouth daily 6 days a week    clonazePAM (KlonoPIN) 0 5 mg tablet Take 1 tablet (0 5 mg total) by mouth 2 (two) times a day    desvenlafaxine succinate (PRISTIQ) 50 mg 24 hr tablet Take 1 tablet (50 mg total) by mouth daily    divalproex sodium (DEPAKOTE ER) 250 mg 24 hr tablet Take 1 tablet (250 mg total) by mouth daily at bedtime Total dose 750 at bedtime    divalproex sodium (DEPAKOTE ER) 500 mg 24 hr tablet Take 1 tablet (500 mg total) by mouth daily    EPINEPHrine (EPIPEN) 0 3 mg/0 3 mL SOAJ Inject 0 3 mL (0 3 mg total) into a muscle once for 1 dose    glucagon (GLUCAGON EMERGENCY) 1 MG injection Inject 1 mg under the skin once as needed for low blood sugar for up to 2 doses    insulin aspart (NovoLOG) 100 units/mL injection Use 30 units per day via pump Disp 1 vial per month (Patient taking differently: Use 100units per day via pump Disp 2 vial per month)    Insulin Infusion Pump KIT 13 mm cannula, 23 inch tubing change site every 3 days    Insulin Pen Needle (BD PEN NEEDLE NASIM U/F) 32G X 4 MM MISC by Does not apply route 4 (four) times a day for 180 days    Multiple Vitamin (DAILY VALUE MULTIVITAMIN) TABS Take by mouth daily     ONETOUCH DELICA LANCETS 51L MISC Patient test 6 times daily    ONETOUCH VERIO test strip Test six times a day     No current facility-administered medications on file prior to visit  She is allergic to iodine - food allergy and insulin glargine            Objective:    Blood pressure 114/77, pulse 91, resp  rate 16, height 5' 1" (1 549 m), weight 70 8 kg (156 lb), not currently breastfeeding  Physical Exam    Neurological Exam  CONSTITUTIONAL: NAD, pleasant  NECK: supple, no lymphadenopathy, no thyromegaly, no JVD  CARDIOVASCULAR: RRR, normal S1S2, no murmurs, no rubs  RESP: clear to auscultation bilaterally, no wheezes/rhonchi/rales  ABDOMEN: soft, non tender, non distended  SKIN: no rash or skin lesions  EXTREMITIES: no edema, pulses 2+bilaterally  PSYCH: appropriate mood and affect  NEUROLOGIC COMPREHENSIVE EXAM: Patient is oriented to person, place and time, NAD; appropriate affect  CN II, III, IV, V, VI, VII,VIII,IX,X,XI-XII intact with EOMI, PERRLA, OKN intact, VF grossly intact, fundi poorly visualized secondary to pupillary constriction; symmetric face noted  Motor: 5/5 UE/LE bilateral symmetric; Sensory: intact to light touch and pinprick bilaterally; normal vibration sensation feet bilaterally; Coordination within normal limits on FTN and [place] testing; DTR: 2/4 through, no Babinski, no clonus  Tandem gait is intact  Romberg: negative  ROS:  12 points of review of system was reviewed with the patient and was unremarkable with exception: see HPI  Review of Systems   Constitutional: Negative    Negative for appetite change and fever  HENT: Negative for hearing loss, tinnitus, trouble swallowing and voice change  Eyes: Positive for visual disturbance  Negative for photophobia and pain  Respiratory: Negative  Negative for shortness of breath  Cardiovascular: Negative  Negative for palpitations  Gastrointestinal: Positive for nausea  Negative for vomiting  Endocrine: Negative  Negative for cold intolerance  Genitourinary: Negative  Negative for dysuria, frequency and urgency  Musculoskeletal: Positive for gait problem  Negative for myalgias and neck pain  Skin: Negative  Negative for rash  Neurological: Positive for weakness and numbness (left knee radiates to her foot)  Negative for dizziness, tremors, seizures, syncope, facial asymmetry, speech difficulty, light-headedness and headaches  Hematological: Negative  Does not bruise/bleed easily  Psychiatric/Behavioral: Positive for sleep disturbance  Negative for confusion and hallucinations

## 2021-04-29 NOTE — TELEPHONE ENCOUNTER
Dr Lucretia Mock asked me to call [facility] to schedule appointment for this patient and then schedule her back for an appointment here 1 month after  Couldn't reach their office so I left a message for them to call and schedule  I asked the patient to call us once this is scheduled so that we can schedule her follow up here

## 2021-04-30 DIAGNOSIS — F31.0 BIPOLAR DISORDER, CURRENT EPISODE HYPOMANIC (HCC): ICD-10-CM

## 2021-04-30 RX ORDER — DIVALPROEX SODIUM 500 MG/1
1000 TABLET, EXTENDED RELEASE ORAL DAILY
Qty: 60 TABLET | Refills: 2 | Status: SHIPPED | OUTPATIENT
Start: 2021-04-30 | End: 2021-08-02 | Stop reason: SDUPTHER

## 2021-05-04 DIAGNOSIS — F41.0 GENERALIZED ANXIETY DISORDER WITH PANIC ATTACKS: ICD-10-CM

## 2021-05-04 DIAGNOSIS — F41.1 GENERALIZED ANXIETY DISORDER WITH PANIC ATTACKS: ICD-10-CM

## 2021-05-04 DIAGNOSIS — F09 COGNITIVE DYSFUNCTION: Primary | ICD-10-CM

## 2021-05-05 ENCOUNTER — TELEPHONE (OUTPATIENT)
Dept: PSYCHIATRY | Facility: CLINIC | Age: 26
End: 2021-05-05

## 2021-05-05 NOTE — TELEPHONE ENCOUNTER
Benja's therapist off [facility]-l/m for patient informing her  Encouraged her to call to reschedule if desired  Next appointment 5/12/21

## 2021-05-12 ENCOUNTER — TELEMEDICINE (OUTPATIENT)
Dept: BEHAVIORAL/MENTAL HEALTH CLINIC | Facility: CLINIC | Age: 26
End: 2021-05-12
Payer: COMMERCIAL

## 2021-05-12 DIAGNOSIS — F31.63 BIPOLAR DISORDER, CURRENT EPISODE MIXED, SEVERE, WITHOUT PSYCHOTIC FEATURES (HCC): ICD-10-CM

## 2021-05-12 DIAGNOSIS — F42.2 MIXED OBSESSIONAL THOUGHTS AND ACTS: Primary | ICD-10-CM

## 2021-05-12 DIAGNOSIS — F43.12 CHRONIC POST-TRAUMATIC STRESS DISORDER (PTSD): ICD-10-CM

## 2021-05-12 PROCEDURE — 90834 PSYTX W PT 45 MINUTES: CPT | Performed by: SOCIAL WORKER

## 2021-05-12 NOTE — PSYCH
This note was not shared with the patient due to this is a psychotherapy note    Virtual Regular Visit      Assessment/Plan:    Problem List Items Addressed This Visit        Other    Chronic post-traumatic stress disorder (PTSD)    OCD (obsessive compulsive disorder) - Primary    Bipolar affective disorder ([facility] [address] )          Goals addressed in session: Goal 1 and Goal 2          Reason for visit is No chief complaint on file  Encounter provider Richard Cronin    Provider located at [address] [place] [id number] [phone]      Recent Visits  No visits were found meeting these conditions  Showing recent visits within past 7 days and meeting all other requirements     Today's Visits  Date Type Provider Dept   05/12/21 Telemedicine Richard Cronin [facility] Therapist Ivan   Showing today's visits and meeting all other requirements     Future Appointments  No visits were found meeting these conditions  Showing future appointments within next 150 days and meeting all other requirements        The patient was identified by name and date of birth  Lillie Bamberger was informed that this is a telemedicine visit and that the visit is being conducted through [employer] and patient was informed that this is a secure, HIPAA-compliant platform  She agrees to proceed     My office door was closed  No one else was in the room  She acknowledged consent and understanding of privacy and security of the video platform  The patient has agreed to participate and understands they can discontinue the visit at any time  Patient is aware this is a billable service  Subjective  Lillie Bamberger is a 27 y o  female    D: Met with Anya moses  Current IP for DKA - 'had a mental breakdown' and 'ripped' out her insulin pump    States she was tired of it and feels she must have to 'pick between her mental and physical health '  States trigger was getting dressed for sister's graduation party and couldn't find a spot on her dress for pump  Sugars were so yuval and she couldn't get them down  "I just didn't want to be sick for a day'  Had a psych eval in ER  Sleep very poor- goes to sleep when the sun comes up  Hasn't begun increase in Depakote due to insurance refusal   Experiencing AH - a pedro voice - can't decipher what it's saying - paranoia increased 'I always think someone is going to try to hurt us or while on a walk someone will come up a steal Dion  Hears neighbors and feels they are coming over to hurt her  'this stuff went on when I was little too  It just went away for some years '      A: Michael Heart presented tearful and discouraged  She was doing well not we're back down again  She states she's not self sabotaging- I don't think so    P: Continue Individual therapy      HPI     Past Medical History:   Diagnosis Date    Abdominal pain     Anxiety     Anxiety and depression     COVID-19 12/2020    Depression     Eating disorder     history of anorexia/bulemia 2521-4536    Fracture of fibula     R Salter I   Claudean Shira disease     Hashimoto's disease 2/21/2020    Head injury     Nasal congestion     PTSD (post-traumatic stress disorder)     Rectal bleed     Seizures (HCC)        Past Surgical History:   Procedure Laterality Date    KNEE SURGERY Right 07/06/2020    NASAL SEPTOPLASTY W/ TURBINOPLASTY      SINUS SURGERY      TURBINOPLASTY N/A 3/22/2021    Procedure: McArthur Chocolette;  Surgeon: Silvano Galaviz MD;  [facility]: [facility];  Service: ENT    WISDOM TOOTH EXTRACTION      WRIST SURGERY      left;  Excision of ganglion       Current Outpatient Medications   Medication Sig Dispense Refill    albuterol (PROVENTIL HFA,VENTOLIN HFA) 90 mcg/act inhaler Inhale 1 puff every 6 (six) hours as needed for wheezing or shortness of breath 1 Inhaler 5    Altavera 0 15-30 MG-MCG per tablet TAKE ONE TABLET BY MOUTH EVERY DAY 84 tablet 3    amitriptyline (ELAVIL) 10 mg tablet Take 1 tablet (10 mg total) by mouth daily at bedtime 30 tablet 2    ARIPiprazole (ABILIFY) 10 mg tablet Take 1 tablet (10 mg total) by mouth daily 30 tablet 2    Blood Glucose Monitoring Suppl (ONETOUCH VERIO) w/Device KIT Use as directed  0    Cholecalciferol (Vitamin D3) 1 25 MG (76925 UT) CAPS Take 50,000 Units by mouth once a week       Cholecalciferol (Vitamin D3) 50 MCG (2000 UT) TABS Take 2,000 Units by mouth daily 6 days a week      clonazePAM (KlonoPIN) 0 5 mg tablet Take 1 tablet (0 5 mg total) by mouth 2 (two) times a day 45 tablet 2    desvenlafaxine succinate (PRISTIQ) 50 mg 24 hr tablet Take 1 tablet (50 mg total) by mouth daily 30 tablet 2    divalproex sodium (DEPAKOTE ER) 500 mg 24 hr tablet Take 2 tablets (1,000 mg total) by mouth daily 60 tablet 2    EPINEPHrine (EPIPEN) 0 3 mg/0 3 mL SOAJ Inject 0 3 mL (0 3 mg total) into a muscle once for 1 dose 0 6 mL 0    glucagon (GLUCAGON EMERGENCY) 1 MG injection Inject 1 mg under the skin once as needed for low blood sugar for up to 2 doses 1 kit 1    insulin aspart (NovoLOG) 100 units/mL injection Use 30 units per day via pump Disp 1 vial per month (Patient taking differently: Use 100units per day via pump Disp 2 vial per month) 30 mL 1    Insulin Infusion Pump KIT 13 mm cannula, 23 inch tubing change site every 3 days      Insulin Pen Needle (BD PEN NEEDLE NASIM U/F) 32G X 4 MM MISC by Does not apply route 4 (four) times a day for 180 days 400 each 3    magnesium oxide (MAG-OX) 400 mg Take 1 tablet (400 mg total) by mouth daily 90 tablet 0    Multiple Vitamin (DAILY VALUE MULTIVITAMIN) TABS Take by mouth daily       ONETOUCH DELICA LANCETS 37E MISC Patient test 6 times daily 600 each 1    ONETOUCH VERIO test strip Test six times a day 600 each 3     No current facility-administered medications for this visit           Allergies Allergen Reactions    Iodine - Food Allergy Throat Swelling    Insulin Glargine Other (See Comments)     Burning and redness under skin           I spent 50 minutes directly with the patient during this visit      VIRTUAL VISIT DISCLAIMER    Ancelmo Bienvenidobao acknowledges that she has consented to an online visit or consultation  She understands that the online visit is based solely on information provided by her, and that, in the absence of a face-to-face physical evaluation by the physician, the diagnosis she receives is both limited and provisional in terms of accuracy and completeness  This is not intended to replace a full medical face-to-face evaluation by the physician  Ancelmo Aceves understands and accepts these terms

## 2021-05-18 ENCOUNTER — TRANSITIONAL CARE MANAGEMENT (OUTPATIENT)
Dept: INTERNAL MEDICINE CLINIC | Facility: CLINIC | Age: 26
End: 2021-05-18

## 2021-05-19 ENCOUNTER — SOCIAL WORK (OUTPATIENT)
Dept: BEHAVIORAL/MENTAL HEALTH CLINIC | Facility: CLINIC | Age: 26
End: 2021-05-19
Payer: COMMERCIAL

## 2021-05-19 ENCOUNTER — OFFICE VISIT (OUTPATIENT)
Dept: INTERNAL MEDICINE CLINIC | Facility: CLINIC | Age: 26
End: 2021-05-19
Payer: COMMERCIAL

## 2021-05-19 ENCOUNTER — TELEPHONE (OUTPATIENT)
Dept: BEHAVIORAL/MENTAL HEALTH CLINIC | Facility: CLINIC | Age: 26
End: 2021-05-19

## 2021-05-19 VITALS
HEART RATE: 102 BPM | WEIGHT: 159.6 LBS | OXYGEN SATURATION: 98 % | BODY MASS INDEX: 30.13 KG/M2 | DIASTOLIC BLOOD PRESSURE: 62 MMHG | SYSTOLIC BLOOD PRESSURE: 110 MMHG | RESPIRATION RATE: 14 BRPM | HEIGHT: 61 IN | TEMPERATURE: 98 F

## 2021-05-19 DIAGNOSIS — E78.5 HYPERLIPIDEMIA, UNSPECIFIED HYPERLIPIDEMIA TYPE: ICD-10-CM

## 2021-05-19 DIAGNOSIS — F41.0 GENERALIZED ANXIETY DISORDER WITH PANIC ATTACKS: ICD-10-CM

## 2021-05-19 DIAGNOSIS — F31.63 BIPOLAR DISORDER, CURRENT EPISODE MIXED, SEVERE, WITHOUT PSYCHOTIC FEATURES (HCC): ICD-10-CM

## 2021-05-19 DIAGNOSIS — E10.65 TYPE 1 DIABETES MELLITUS WITH HYPERGLYCEMIA (HCC): ICD-10-CM

## 2021-05-19 DIAGNOSIS — F42.2 MIXED OBSESSIONAL THOUGHTS AND ACTS: Primary | ICD-10-CM

## 2021-05-19 DIAGNOSIS — F41.1 GENERALIZED ANXIETY DISORDER WITH PANIC ATTACKS: ICD-10-CM

## 2021-05-19 DIAGNOSIS — E06.3 HASHIMOTO'S DISEASE: Primary | ICD-10-CM

## 2021-05-19 DIAGNOSIS — Z91.018 FOOD ALLERGY: ICD-10-CM

## 2021-05-19 PROBLEM — J01.90 ACUTE NON-RECURRENT SINUSITIS: Status: RESOLVED | Noted: 2021-02-09 | Resolved: 2021-05-19

## 2021-05-19 PROCEDURE — 3008F BODY MASS INDEX DOCD: CPT | Performed by: INTERNAL MEDICINE

## 2021-05-19 PROCEDURE — 99496 TRANSJ CARE MGMT HIGH F2F 7D: CPT | Performed by: NURSE PRACTITIONER

## 2021-05-19 PROCEDURE — 90834 PSYTX W PT 45 MINUTES: CPT | Performed by: SOCIAL WORKER

## 2021-05-19 RX ORDER — LANCETS 30 GAUGE
EACH MISCELLANEOUS
COMMUNITY
Start: 2021-05-14 | End: 2021-11-02

## 2021-05-19 RX ORDER — ERGOCALCIFEROL 1.25 MG/1
50000 CAPSULE ORAL WEEKLY
COMMUNITY
Start: 2021-05-06 | End: 2021-11-02

## 2021-05-19 RX ORDER — FOLIC ACID 1 MG/1
1 TABLET ORAL DAILY
COMMUNITY
Start: 2021-05-14 | End: 2021-11-02

## 2021-05-19 RX ORDER — ONDANSETRON 4 MG/1
4 TABLET, ORALLY DISINTEGRATING ORAL EVERY 8 HOURS PRN
COMMUNITY
Start: 2021-05-14 | End: 2021-05-24

## 2021-05-19 RX ORDER — EPINEPHRINE 0.3 MG/.3ML
0.3 INJECTION SUBCUTANEOUS ONCE
Qty: 0.6 ML | Refills: 0 | Status: SHIPPED | OUTPATIENT
Start: 2021-05-19 | End: 2022-05-16

## 2021-05-19 RX ORDER — BLOOD SUGAR DIAGNOSTIC
STRIP MISCELLANEOUS
COMMUNITY
Start: 2021-05-14 | End: 2021-11-02

## 2021-05-19 RX ORDER — LEVOTHYROXINE SODIUM 0.03 MG/1
25 TABLET ORAL DAILY
Qty: 60 TABLET | Refills: 0 | Status: SHIPPED | OUTPATIENT
Start: 2021-05-19

## 2021-05-19 RX ORDER — LEVOTHYROXINE SODIUM 0.05 MG/1
50 TABLET ORAL DAILY
Qty: 30 TABLET | Refills: 1 | Status: CANCELLED | OUTPATIENT
Start: 2021-05-19

## 2021-05-19 NOTE — ASSESSMENT & PLAN NOTE
Ongoing symptoms consistent with thyroid dysfunction  Repeat labs done, TSH elevated  She contacted her endo regarding the result and they told her they would discuss with her at her visit in August   Pt feels that the appointment is very likely to get rescheduled again as this happens often with her provider  Given her symptoms, would like to start her on levothyroxine 25 mcg  She will still see endo in August as planned but will monitor response to medication until then, she will f/u as planned in about a month  Christian Good

## 2021-05-19 NOTE — PROGRESS NOTES
BMI Counseling: There is no height or weight on file to calculate BMI  The BMI is above normal  Nutrition recommendations include encouraging healthy choices of fruits and vegetables, moderation in carbohydrate intake, increasing intake of lean protein, reducing intake of saturated and trans fat and reducing intake of cholesterol  Exercise recommendations include exercising 3-5 times per week and strength training exercises  Assessment/Plan:    Type 1 diabetes mellitus with hyperglycemia (HCC)  Managed by endo at   Pt self dc'd insulin pump, now on tresiba 34 units daily, novolog tdd 50 units correcting for carb intake  She had her eye exam already this year, will consider starting statin after repeating her lipids prior to her next visit  Continue f/u with endo, scheduled for august   Lab Results   Component Value Date    HGBA1C 6 8 (H) 09/14/2020       Hashimoto's disease  Ongoing symptoms consistent with thyroid dysfunction  Repeat labs done, TSH elevated  She contacted her endo regarding the result and they told her they would discuss with her at her visit in August   Pt feels that the appointment is very likely to get rescheduled again as this happens often with her provider  Given her symptoms, would like to start her on levothyroxine 25 mcg  She will still see endo in August as planned but will monitor response to medication until then, she will f/u as planned in about a month       Bipolar affective disorder ([facility] [address] )  After a "mental breakdown" prior to hospitalization, psychiatry increased depakote to 1000 mg in addition to abilify, elavil, clonazepam   She does feel her mood is more stable though increased side effects reported  She is scheduled with her therapist today and she will be discussing this with them  Continue prescribed treatment  Hyperlipidemia  Reviewed lipids  Mildly elevated with hx of DM  Also possible contributing factor relating to thyroid dysfunction    Reviewed dietary sources of LDL cholesterol and will repeat lipids prior to her next visit  If still elevated would like to consider a statin  Diagnoses and all orders for this visit:    Hashimoto's disease  -     levothyroxine 25 mcg tablet; Take 1 tablet (25 mcg total) by mouth daily    Food allergy  -     EPINEPHrine (EPIPEN) 0 3 mg/0 3 mL SOAJ; Inject 0 3 mL (0 3 mg total) into a muscle once for 1 dose    Hyperlipidemia, unspecified hyperlipidemia type  -     Lipid panel; Future    Type 1 diabetes mellitus with hyperglycemia (HCC)    B[facility], current episode mixed, severe, without psychotic features [facility]    Other orders  -     ACETONE, URINE, TEST VI; Use as needed to test your urine for ketones  -     Insulin Syringe-Needle U-100 31G X 5/16" 0 5 ML MISC; Use to draw up your insulin  -     ergocalciferol (VITAMIN D2) 50,000 units; 50,000 Units once a week  -     folic acid (FOLVITE) 1 mg tablet; Take 1 mg by mouth daily  -     insulin degludec (TRESIBA) 100 units/mL injection pen; 34 units once daily E10 65  -     ondansetron (ZOFRAN-ODT) 4 mg disintegrating tablet; Take 4 mg by mouth every 8 (eight) hours as needed  -     Lancets MISC; Use  -     Cancel: TSH, 3rd generation; Future  -     Cancel: T4, free; Future  -     Cancel: Testosterone, free, total; Future  -     Cancel: levothyroxine 50 mcg tablet; Take 1 tablet (50 mcg total) by mouth daily          Subjective:      Patient ID: Karuna Belle is a 27 y o  female  Pt is a 27y o  year old female who is here for TCM visit following hospitalization at [facility] from 5/11-5/14  with discharge to Home   Admission diagnosis was ketoacidosis  She presented to the ER with hyperglycemia, n/v, abdominal pain, and polyruia  Several days prior, she reports having a "mental breakdown" during which time she removed and damaged her insulin pump  She attempted to manage sugar with tresiba and novolog  She started having symptoms, including visual hallucinations  BG on admission was 540 with admission to ICU with insulin infusion  Pt transitioned to floor on sub q insulin  Evaluated by psychiatry while admitted with increased dose of Depakote  She feels mood is stable since increasing dose but she notes feeling increased side effects on the higher dose including clumsiness  Discharge medications reviewed yes, insulin increased  Sugars have been reasonably well controlled since d/c when considering her baseline  Pt is still experiencing symptoms of hair loss, dry skin, amenorrhea, weakness, fatigue, poor concentration, weight gain  The following portions of the patient's history were reviewed and updated as appropriate: allergies, current medications, past family history, past medical history, past social history, past surgical history and problem list     Review of Systems   Constitutional: Positive for fatigue and unexpected weight change  Negative for activity change, appetite change, chills and fever  Eyes: Negative for visual disturbance  Respiratory: Negative for cough, chest tightness and shortness of breath  Cardiovascular: Negative for chest pain, palpitations and leg swelling  Gastrointestinal: Negative for constipation, diarrhea, nausea and vomiting  Genitourinary: Positive for menstrual problem  Negative for difficulty urinating, dysuria and frequency  Musculoskeletal: Positive for arthralgias and myalgias  Neurological: Positive for weakness, numbness and headaches  Negative for dizziness  Psychiatric/Behavioral: Negative for sleep disturbance  Objective:      /62   Pulse 102   Temp 98 °F (36 7 °C) (Skin)   Resp 14   Ht 5' 1" (1 549 m)   Wt 72 4 kg (159 lb 9 6 oz)   SpO2 98%   BMI 30 16 kg/m²          Physical Exam  Vitals signs reviewed  Constitutional:       General: She is awake  She is not in acute distress  Appearance: Normal appearance  She is well-developed and well-groomed   She is not ill-appearing, toxic-appearing or diaphoretic  Eyes:      General: Lids are normal       Conjunctiva/sclera: Conjunctivae normal    Cardiovascular:      Rate and Rhythm: Normal rate and regular rhythm  Pulses: Normal pulses  Heart sounds: Normal heart sounds  No murmur  Pulmonary:      Effort: Pulmonary effort is normal       Breath sounds: Normal breath sounds  Abdominal:      General: Bowel sounds are normal       Palpations: Abdomen is soft  Tenderness: There is no abdominal tenderness  Musculoskeletal: Normal range of motion  Right lower leg: No edema  Left lower leg: No edema  Skin:     General: Skin is warm and dry  Neurological:      General: No focal deficit present  Mental Status: She is alert and oriented to person, place, and time  Psychiatric:         Attention and Perception: Attention normal          Mood and Affect: Mood normal          Speech: Speech normal          Behavior: Behavior normal  Behavior is cooperative  Thought Content:  Thought content normal          Cognition and Memory: Cognition normal          Judgment: Judgment normal

## 2021-05-19 NOTE — ASSESSMENT & PLAN NOTE
Managed by treva at   Pt self dc'd insulin pump, now on tresiba 34 units daily, novolog tdd 50 units correcting for carb intake  She had her eye exam already this year, will consider starting statin after repeating her lipids prior to her next visit    Continue f/u with treva, scheduled for august   Lab Results   Component Value Date    HGBA1C 6 8 (H) 09/14/2020

## 2021-05-19 NOTE — PSYCH
This note was not shared with the patient due to this is a psychotherapy note    Psychotherapy Provided: Individual Psychotherapy 50 minutes     Length of time in session: 50 minutes, follow up in 1 week    No diagnosis found  Goals addressed in session: Goal 1 and Goal 2     Pain:      none    4    Current suicide risk : Low     D:  Started on thyroid med today  States 'stumbling around' still since Depakote increase  Started new dose in hospital and symptoms remain 'can't stay awake a lot of the time '  I told her to return to previous dose until she sees Dr Annamarie Kirkpatrick again  Conitnues to experience AH and VH x2-3 months  States experienced this as a child - especially while in heightened fight or flight phase  Processed right to get angry with all medical issues without self injury in the future  Denied SI    A: Martín Im presented lethargic yet actively participated  Future goals identified - progress    P: Continue individual therapy    Behavioral Health Treatment Plan [facility]: Diagnosis and Treatment Plan explained to Sylvester Reeves relates understanding diagnosis and is agreeable to Treatment Plan   Yes

## 2021-05-19 NOTE — ASSESSMENT & PLAN NOTE
After a "mental breakdown" prior to hospitalization, psychiatry increased depakote to 1000 mg in addition to abilify, elavil, clonazepam   She does feel her mood is more stable though increased side effects reported  She is scheduled with her therapist today and she will be discussing this with them  Continue prescribed treatment

## 2021-05-19 NOTE — ASSESSMENT & PLAN NOTE
Reviewed lipids  Mildly elevated with hx of DM  Also possible contributing factor relating to thyroid dysfunction  Reviewed dietary sources of LDL cholesterol and will repeat lipids prior to her next visit  If still elevated would like to consider a statin

## 2021-05-20 ENCOUNTER — TELEPHONE (OUTPATIENT)
Dept: ADMINISTRATIVE | Facility: OTHER | Age: 26
End: 2021-05-20

## 2021-05-20 NOTE — TELEPHONE ENCOUNTER
----- Message from Nu Acosta sent at 5/19/2021  2:22 PM EDT -----  Regarding: Eye Exam  05/19/21 2:22 PM    Hello, our patient attached above  has had Diabetic Eye Exam completed/performed  Please assist in updating the patient chart by making an External outreach to [facility] located in [address] [place] [address]  The date of service is March 2021      Thank you,  Shantelle Batres [facility] [place] [facility]

## 2021-05-20 NOTE — LETTER
Diabetic Eye Exam Form    Date Requested: 21  Patient: Elin Hines  Patient : 1995   Referring Provider: MARGARET Herrera    Dilated Retinal Exam, Optomap-Iris Exam, or Fundus Photography Done         Yes ([place] one above)         No     Date of Diabetic Eye Exam ______________________________  Left Eye      Exam did show retinopathy    Exam did not show retinopathy         Mild       Moderate       None       Proliferative       Severe     Right Eye     Exam did show retinopathy    Exam did not show retinopathy         Mild       Moderate       None       Proliferative       Severe     Comments __________________________________________________________    Practice Providing Exam ______________________________________________    Exam Performed By (print name) _______________________________________      Provider Signature ___________________________________________________      These reports are needed for  compliance    Please fax this completed form and a copy of the Diabetic Eye Exam report to our office located at [place] [address] as soon as possible to [phone] jovon Farah Du: Phone [phone]    We thank you for your assistance in treating our mutual patient

## 2021-05-20 NOTE — LETTER
Diabetic Eye Exam Form    Date Requested: 21  Patient: Ryland Copeland  Patient : 1995   Referring Provider: MARGARET Hardy    Dilated Retinal Exam, Optomap-Iris Exam, or Fundus Photography Done         Yes ([place] one above)         No     Date of Diabetic Eye Exam ______________________________  Left Eye      Exam did show retinopathy    Exam did not show retinopathy         Mild       Moderate       None       Proliferative       Severe     Right Eye     Exam did show retinopathy    Exam did not show retinopathy         Mild       Moderate       None       Proliferative       Severe     Comments __________________________________________________________    Practice Providing Exam ______________________________________________    Exam Performed By (print name) _______________________________________      Provider Signature ___________________________________________________      These reports are needed for  compliance    Please fax this completed form and a copy of the Diabetic Eye Exam report to our office located at [place] [address] as soon as possible to [phone] jovon Polanco: Phone [phone]    We thank you for your assistance in treating our mutual patient

## 2021-05-25 NOTE — TELEPHONE ENCOUNTER
As a follow-up, a second attempt has been made for outreach via fax, please see Contacts section for details      Thank you  All Christie

## 2021-05-26 ENCOUNTER — TELEMEDICINE (OUTPATIENT)
Dept: BEHAVIORAL/MENTAL HEALTH CLINIC | Facility: CLINIC | Age: 26
End: 2021-05-26
Payer: COMMERCIAL

## 2021-05-26 DIAGNOSIS — F41.0 GENERALIZED ANXIETY DISORDER WITH PANIC ATTACKS: ICD-10-CM

## 2021-05-26 DIAGNOSIS — F41.1 GENERALIZED ANXIETY DISORDER WITH PANIC ATTACKS: ICD-10-CM

## 2021-05-26 DIAGNOSIS — F43.12 CHRONIC POST-TRAUMATIC STRESS DISORDER (PTSD): ICD-10-CM

## 2021-05-26 DIAGNOSIS — F42.2 MIXED OBSESSIONAL THOUGHTS AND ACTS: Primary | ICD-10-CM

## 2021-05-26 DIAGNOSIS — F31.63 BIPOLAR DISORDER, CURRENT EPISODE MIXED, SEVERE, WITHOUT PSYCHOTIC FEATURES (HCC): ICD-10-CM

## 2021-05-26 PROCEDURE — 90834 PSYTX W PT 45 MINUTES: CPT | Performed by: SOCIAL WORKER

## 2021-05-26 NOTE — TELEPHONE ENCOUNTER
Upon review of the In Basket request we were able to locate, review, and update the patient chart as requested for Diabetic Eye Exam     Any additional questions or concerns should be emailed to the Practice Liaisons via [email or web address] email, please do not reply via In Basket      Thank you  Diana Fraire

## 2021-05-26 NOTE — PSYCH
This note was not shared with the patient due to this is a psychotherapy note      Virtual Regular Visit      Assessment/Plan:    Problem List Items Addressed This Visit        Other    Chronic post-traumatic stress disorder (PTSD)    Generalized anxiety disorder with panic attacks    OCD (obsessive compulsive disorder) - Primary    Bipolar affective disorder ([facility] [address] )          Goals addressed in session: Goal 1, Goal 2 and Goal 3           Reason for visit is No chief complaint on file  Encounter provider Trish Prescott    Provider located at [address] [place] [id number] [phone]      Recent Visits  Date Type Provider Dept   05/19/21 Telephone Trish Prescott [facility] Therapist [facility]   05/19/21 Office Visit [address], [address] Internal Med   Showing recent visits within past 7 days and meeting all other requirements     Today's Visits  Date Type Provider Dept   05/26/21 Telemedicine Trish Prescott [facility] Therapist [facility]   Showing today's visits and meeting all other requirements     Future Appointments  No visits were found meeting these conditions  Showing future appointments within next 150 days and meeting all other requirements        The patient was identified by name and date of birth  Zenaida Canavan was informed that this is a telemedicine visit and that the visit is being conducted through [address] [place] [address] Now and patient was informed that this is a secure, HIPAA-compliant platform  She agrees to proceed     My office door was closed  No one else was in the room  She acknowledged consent and understanding of privacy and security of the video platform  The patient has agreed to participate and understands they can discontinue the visit at any time  Patient is aware this is a billable service       Subjective  Zenaida Canavan is a 27 y o  female      D: Met with Gelacio Johnson individually  Not feeling well for past 3 weeks  'I have such low heat tolerance  Spoke with PCP who suggested to keep taking thyroid meds  Irritable lately - frustrated easily  Still awaiting for disability hearing date - promising as 's decision is being challenged  Bad argument with Chauncey Peña yesterday - dealing with conflict 'ends up in a meltdown '  Poor emotion regulation 'shuts down more'  Discussed trauma history has a lot to do with issues where others raise their voice  Gelacio Johnson acknowledged she was wrong - understood why Chauncey Peña was reacting this way  Made a very big purchase and didn't include him in the decision  Chauncey Peña left for 4 days and sister came to stay over weekend  Spoke with sister a lot  Denied SI    A: Gelacio Johnson presented more impulsive and more paranoid than usual   Gelacio Johnson is thinking about leaving - especially if disability comes in  'I feel like a traitor'  P: Continue individual therapy      HPI     Past Medical History:   Diagnosis Date    Abdominal pain     Anxiety     Anxiety and depression     COVID-19 12/2020    Depression     Eating disorder     history of anorexia/bulemia [id number]    Fracture of fibula     R Salter I   Priscilla Mu disease     Hashimoto's disease 2/21/2020    Head injury     Nasal congestion     PTSD (post-traumatic stress disorder)     Rectal bleed     Seizures (HCC)        Past Surgical History:   Procedure Laterality Date    KNEE SURGERY Right 07/06/2020    NASAL SEPTOPLASTY W/ TURBINOPLASTY      SINUS SURGERY      TURBINOPLASTY N/A 3/22/2021    Procedure: TURBINOPLASTY;  Surgeon: Brennen Root MD;  [facility];  Service: ENT    WISDOM TOOTH EXTRACTION      WRIST SURGERY      left;  Excision of ganglion       Current Outpatient Medications   Medication Sig Dispense Refill    ACETONE, URINE, TEST VI Use as needed to test your urine for ketones      albuterol (PROVENTIL HFA,VENTOLIN HFA) 90 mcg/act inhaler Inhale 1 puff every 6 (six) hours as needed for wheezing or shortness of breath 1 Inhaler 5    Altavera 0 15-30 MG-MCG per tablet TAKE ONE TABLET BY MOUTH EVERY DAY 84 tablet 3    amitriptyline (ELAVIL) 10 mg tablet Take 1 tablet (10 mg total) by mouth daily at bedtime 30 tablet 2    ARIPiprazole (ABILIFY) 10 mg tablet Take 1 tablet (10 mg total) by mouth daily 30 tablet 2    Blood Glucose Monitoring Suppl (ONETOUCH VERIO) w/Device KIT Use as directed  0    Cholecalciferol (Vitamin D3) 1 25 MG (92880 UT) CAPS Take 50,000 Units by mouth once a week       Cholecalciferol (Vitamin D3) 50 MCG (2000 UT) TABS Take 2,000 Units by mouth daily 6 days a week      clonazePAM (KlonoPIN) 0 5 mg tablet Take 1 tablet (0 5 mg total) by mouth 2 (two) times a day 45 tablet 2    desvenlafaxine succinate (PRISTIQ) 50 mg 24 hr tablet Take 1 tablet (50 mg total) by mouth daily 30 tablet 2    divalproex sodium (DEPAKOTE ER) 500 mg 24 hr tablet Take 2 tablets (1,000 mg total) by mouth daily 60 tablet 2    EPINEPHrine (EPIPEN) 0 3 mg/0 3 mL SOAJ Inject 0 3 mL (0 3 mg total) into a muscle once for 1 dose 0 6 mL 0    ergocalciferol (VITAMIN D2) 50,000 units 50,000 Units once a week      folic acid (FOLVITE) 1 mg tablet Take 1 mg by mouth daily      glucagon (GLUCAGON EMERGENCY) 1 MG injection Inject 1 mg under the skin once as needed for low blood sugar for up to 2 doses 1 kit 1    insulin aspart (NovoLOG) 100 units/mL injection Use 30 units per day via pump Disp 1 vial per month (Patient taking differently: Use 100units per day via pump Disp 2 vial per month) 30 mL 1    insulin degludec (TRESIBA) 100 units/mL injection pen 34 units once daily E10 65      Insulin Infusion Pump KIT 13 mm cannula, 23 inch tubing change site every 3 days      Insulin Pen Needle (BD PEN NEEDLE NASIM U/F) 32G X 4 MM MISC by Does not apply route 4 (four) times a day for 180 days 400 each 3    Insulin Syringe-Needle U-100 31G X 5/16" 0 5 ML MISC Use to draw up your insulin      Lancets MISC Use      levothyroxine 25 mcg tablet Take 1 tablet (25 mcg total) by mouth daily 60 tablet 0    magnesium oxide (MAG-OX) 400 mg Take 1 tablet (400 mg total) by mouth daily 90 tablet 0    Multiple Vitamin (DAILY VALUE MULTIVITAMIN) TABS Take by mouth daily       ONETOUCH DELICA LANCETS 05W MISC Patient test 6 times daily 600 each 1    ONETOUCH VERIO test strip Test six times a day 600 each 3     No current facility-administered medications for this visit  Allergies   Allergen Reactions    Iodine - Food Allergy Throat Swelling    Insulin Glargine Other (See Comments)     Burning and redness under skin       Review of Systems         I spent 50 minutes directly with the patient during this visit      VIRTUAL VISIT DISCLAIMER    Chrissie Delgado acknowledges that she has consented to an online visit or consultation  She understands that the online visit is based solely on information provided by her, and that, in the absence of a face-to-face physical evaluation by the physician, the diagnosis she receives is both limited and provisional in terms of accuracy and completeness  This is not intended to replace a full medical face-to-face evaluation by the physician  Chrissie Delgado understands and accepts these terms

## 2021-05-31 NOTE — PROGRESS NOTES
Assessment and Plan:   Ms Ana Daniel is a 68-year-old female with history significant for type 1 insulin-dependent diabetes mellitus diagnosed in 2019 and Hashimoto's thyroiditis diagnosed in 2020, who presents for further evaluation of a positive SOHAM 1:80 nuclear, speckled, homogeneous patterns and rheumatoid factor of 10, in addition to widespread joint pains  She is referred by MARGARET Bourgeois for a rheumatology consult  Charlotte Chaudhari presents today for further evaluation of widespread arthralgias and myalgias she has been experiencing over the past 1 and half years which nearly coincides with her diagnosis of the additional autoimmune diseases, specifically type 1 diabetes mellitus and Hashimoto's thyroiditis  She does not describe prominent inflammatory symptoms such as significant joint swelling or prolonged morning stiffness and there is no synovitis noted on her examination today  The pain could be arising from a possible fibromyalgia like component but we will discuss this further once the autoimmune workup is complete  To further evaluate for an inflammatory etiology to her presentation especially given her predisposition for autoimmunity I will complete a thorough workup as listed below to assess the positive SOHAM and rheumatoid factor  Other than the articular manifestations her review of systems for an underlying connective tissue disorder seems to be unrevealing and many of the symptoms she describes such as the chronic fatigue, unintentional weight gain and hair thinning may be secondary to the hypothyroidism as a result of Hashimoto's thyroiditis  Based on my initial impression I do not appreciate concerns for lupus related conditions or rheumatoid arthritis  In the meanwhile for the joint pains she may continue with the over-the-counter NSAIDs as needed and water therapy      - I also discussed with her due to the underlying autoimmune conditions that this can induce a false-positive SOHAM which specifically makes it clinically insignificant from a rheumatic perspective  If the additional testing is unremarkable then I would like her to pursue treatment for the hypothyroidism first and monitor for improvement in her symptoms  Plan:  Diagnoses and all orders for this visit:    Positive SOHAM (antinuclear antibody)  -     Anti-Marylou 1 Antibody; Future  -     Anti-scleroderma antibody; Future  -     Beta-2 glycoprotein antibodies; Future  -     C3 complement; Future  -     C4 complement; Future  -     Cardiolipin antibody; Future  -     Lupus anticoagulant; Future  -     Centromere Antibody; Future  -     Nuclear antigen antibody; Future  -     Protein / creatinine ratio, urine  -     Urinalysis with microscopic  -     CK; Future  -     Cyclic citrul peptide antibody, IgG; Future  -     HLA-B27 antigen; Future    Elevated rheumatoid factor  -     XR hand 3+ vw right; Future  -     XR hand 3+ vw left; Future  -     XR foot 3+ vw right; Future  -     XR foot 3+ vw left; Future  -     Cyclic citrul peptide antibody, IgG; Future    Diffuse arthralgia  -     HLA-B27 antigen; Future    Myalgia  -     CK; Future  -     HLA-B27 antigen; Future    Hashimoto's disease    Type 1 diabetes mellitus without complication (HCC)    Vitamin D deficiency  -     Vitamin D 25 hydroxy; Future      I have personally reviewed pertinent films in PACS of the MRI cervical spine which is normal        Activities as tolerated  Exercise: try to maintain a low impact exercise regimen as much as possible  Walk for 30 minutes a day for at least 3 days a week  Continue other medications as prescribed by PCP and other specialists  RTC in 4-6 weeks  HPI  Ms Rodrigues is a 82-year-old female with history significant for type 1 insulin-dependent diabetes mellitus diagnosed in 2019 and Hashimoto's thyroiditis (elevated thyroid microsomal and thyroglobulin antibodies) diagnosed in 2020, who presents for further evaluation of a positive SOHAM 1:80 nuclear, speckled, homogeneous patterns and rheumatoid factor of 10, in addition to widespread joint pains  She is referred by MARGARET Bourgeois for a rheumatology consult  Patient reports she started to feel unwell approximately 2 years ago and further evaluation for this led to the diagnosis of type 1 diabetes as well as the Hashimoto's thyroiditis  She has been managing the diabetes with insulin and states for the hypothyroidism as a result of Hashimoto's thyroiditis she was only started on levothyroxine 25 mcg once daily approximately 1 month ago  There has been a conflict between the endocrinologists she has seen in regards to starting the levothyroxine, but due to progressive complaints which the patient and her primary care physician felt was related to hypothyroidism she was finally started on thyroid supplementation 1 month ago  She has not noticed a change in her symptoms so far and has not had a TSH rechecked yet  The TSH was slightly elevated at 5 24 on 5/1 prior to starting the levothyroxine  She is also scheduled to see a private endocrinologist for another opinion  She reports over the past 1 and half years she has also started to experience joint and muscle pain which has been gradually progressive  She experiences a degree of pain on a daily basis but states that her symptoms can spontaneously flare-up as well as with changes in the weather, especially when it is cold/damp/humid  She does feel better with the warmer weather and has also started utilizing a therapy pool which does help with her symptoms  She has noticed joint pains to affect her hands occasionally but prominently in her wrists  She will notice pain in her shoulders and hips mostly with manipulation and range of motion  She describes chronic pain that will also affect her knees as well as in her ankles and feet    At times she will notice a muscle pain throughout her upper and lower extremities as well  She has noticed muscle cramps to affect her hands and feet  No significant joint pains in her elbows or low back  She has not noticed any obvious swelling of her wrists but feels like they may be swollen as she can gauge this by her bracelet  She has also noticed swelling to affect her ankles  She does experience morning stiffness which affects her diffusely and takes about 30-40 minutes to improve  She tries to avoid Tylenol as this affects her blood glucose monitoring  She has tried ibuprofen for her symptoms which has not helped significantly  Other than the body pain she also describes chronic fatigue, unintentional weight gain and hair thinning  She denies fevers, unintentional weight loss, focal alopecia, dry eyes, dry mouth, inflammatory eye disease, skin rash, psoriasis, photosensitivity, mouth/nose ulcers, swollen glands, pleuritic chest pain, shortness of breath, inflammatory bowel disease, blood clots (reports a history of 1 very early miscarriage), Raynaud's or a family history of autoimmune disease  Testing done for her symptoms showed the positive SOHAM and borderline positive rheumatoid factor  A rheumatoid factor was negative in 2019  An ESR, CRP, anti CCP antibody, CK, Lyme antibody profile, anti neutrophilic cytoplasmic antibodies, Sjogren's antibodies and anti double-stranded DNA antibody were normal   An MRI of her right knee and ultrasound of her right Achilles tendon in 2018 were normal     In view of her complaints she was also evaluated by Neurology to rule out multiple sclerosis and currently there is no specific diagnosis from their perspective  She did have an MRI of her brain done last year which showed a single focus of white matter change which has been stable since 2014      The following portions of the patient's history were reviewed and updated as appropriate: allergies, current medications, past family history, past medical history, past social history, past surgical history and problem list       Review of Systems  Constitutional: Negative for fevers, chills, night sweats  Positive for fatigue and weight gain  ENT/Mouth: Negative for hearing changes, ear pain, nasal congestion, sinus pain, hoarseness, sore throat, rhinorrhea, swallowing difficulty  Eyes: Negative for pain, redness, discharge, vision changes  Cardiovascular: Negative for chest pain, SOB, palpitations  Respiratory: Negative for cough, sputum, wheezing, dyspnea  Gastrointestinal: Negative for nausea, vomiting, diarrhea, constipation, pain, heartburn  Genitourinary: Negative for dysuria, urinary frequency, hematuria  Musculoskeletal: As per HPI  Skin: Negative for skin rash, color changes  Neuro: Negative for weakness, numbness, tingling, loss of consciousness  Psych: Negative for anxiety, depression  Heme/Lymph: Negative for easy bruising, bleeding, lymphadenopathy  Past Medical History:   Diagnosis Date    Abdominal pain     Anxiety     Anxiety and depression     COVID-19 12/2020    Depression     Eating disorder     history of anorexia/bulemia [id number]    Fracture of fibula     R Salter I   Synetta Anamaria disease     Hashimoto's disease 2/21/2020    Head injury     Nasal congestion     PTSD (post-traumatic stress disorder)     Rectal bleed     Seizures (HCC)        Past Surgical History:   Procedure Laterality Date    KNEE SURGERY Right 07/06/2020    NASAL SEPTOPLASTY W/ TURBINOPLASTY      SINUS SURGERY      TURBINOPLASTY N/A 3/22/2021    Procedure: Rosendo Rail;  Surgeon: Elvin Salguero MD;  [facility];  Service: ENT    WISDOM TOOTH EXTRACTION      WRIST SURGERY      left;  Excision of ganglion       Social History     Socioeconomic History    Marital status: Single     Spouse name: Not on file    Number of children: 0    Years of education: Not on file    Highest education level: Associate degree: academic program   Occupational History    Occupation: unemployed   Social Needs    Financial resource strain: Somewhat hard   [place] insecurity     Worry: Not on file     Inability: Not on file   [employer] needs     Medical: Not on file     Non-medical: Not on file   Tobacco Use    Smoking status: Never Smoker    Smokeless tobacco: Never Used    Tobacco comment: Tobacco smoke exposure (Father smokes cigars)   Substance and Sexual Activity    Alcohol use: Never     Frequency: Never    Drug use: Never    Sexual activity: Not Currently     Partners: Male     Birth control/protection: Condom Male, OCP   Lifestyle    Physical activity     Days per week: 7 days     Minutes per session: 10 min    Stress: Rather much   Relationships    Social connections     Talks on phone: More than three times a week     Gets together: Not on file     Attends Taoist service: Not on file     Active member of club or organization: No     Attends meetings of clubs or organizations: Never     Relationship status: Never     Intimate partner violence     Fear of current or ex partner: No     Emotionally abused: No     Physically abused: No     Forced sexual activity: No   Other Topics Concern    Not on file   Social History Narrative    Student at [employer] a poor diet; low in vegetables, high in sweets    Dental care, regularly    Lives with parents    Sleeps 8-10 hours a day       Family History   Problem Relation Age of Onset    Hypertension Mother     Migraines Mother         Headache    Diabetes type II Mother     Varicose Veins Mother     Hyperlipidemia Mother     Diabetes Mother     Arthritis Mother     Depression Mother     Cholelithiasis Father     Hypertension Father     Sarcoidosis Father         Liver    Hyperlipidemia Father     Diabetes Father     Coronary artery disease Father     Nephrolithiasis Father     Cirrhosis Father     Alcohol abuse Father     Thyroid disease Sister     Cancer Family     Diabetes Family     Hypertension Family     Alcohol abuse Brother        Allergies   Allergen Reactions    Iodine - Food Allergy Throat Swelling    Insulin Glargine Other (See Comments)     Burning and redness under skin       Current Outpatient Medications:     ACETONE, URINE, TEST VI, Use as needed to test your urine for ketones, Disp: , Rfl:     albuterol (PROVENTIL HFA,VENTOLIN HFA) 90 mcg/act inhaler, Inhale 1 puff every 6 (six) hours as needed for wheezing or shortness of breath, Disp: 1 Inhaler, Rfl: 5    Altavera 0 15-30 MG-MCG per tablet, TAKE ONE TABLET BY MOUTH EVERY DAY, Disp: 84 tablet, Rfl: 3    amitriptyline (ELAVIL) 10 mg tablet, Take 1 tablet (10 mg total) by mouth daily at bedtime, Disp: 30 tablet, Rfl: 2    ARIPiprazole (ABILIFY) 10 mg tablet, Take 1 tablet (10 mg total) by mouth daily, Disp: 30 tablet, Rfl: 2    Blood Glucose Monitoring Suppl (ONETOUCH VERIO) w/Device KIT, Use as directed, Disp: , Rfl: 0    Cholecalciferol (Vitamin D3) 1 25 MG (18535 UT) CAPS, Take 50,000 Units by mouth once a week , Disp: , Rfl:     Cholecalciferol (Vitamin D3) 50 MCG (2000 UT) TABS, Take 2,000 Units by mouth daily 6 days a week, Disp: , Rfl:     clonazePAM (KlonoPIN) 0 5 mg tablet, Take 1 tablet (0 5 mg total) by mouth 2 (two) times a day, Disp: 45 tablet, Rfl: 2    desvenlafaxine succinate (PRISTIQ) 50 mg 24 hr tablet, Take 1 tablet (50 mg total) by mouth daily, Disp: 30 tablet, Rfl: 2    divalproex sodium (DEPAKOTE ER) 500 mg 24 hr tablet, Take 2 tablets (1,000 mg total) by mouth daily, Disp: 60 tablet, Rfl: 2    EPINEPHrine (EPIPEN) 0 3 mg/0 3 mL SOAJ, Inject 0 3 mL (0 3 mg total) into a muscle once for 1 dose, Disp: 0 6 mL, Rfl: 0    ergocalciferol (VITAMIN D2) 50,000 units, 50,000 Units once a week, Disp: , Rfl:     folic acid (FOLVITE) 1 mg tablet, Take 1 mg by mouth daily, Disp: , Rfl:     glucagon (GLUCAGON EMERGENCY) 1 MG injection, Inject 1 mg under the skin once as needed for low blood sugar for up to 2 doses, Disp: 1 kit, Rfl: 1    insulin aspart (NovoLOG) 100 units/mL injection, Use 30 units per day via pump Disp 1 vial per month (Patient taking differently: Use 100units per day via pump Disp 2 vial per month), Disp: 30 mL, Rfl: 1    insulin degludec (TRESIBA) 100 units/mL injection pen, 34 units once daily E10 65, Disp: , Rfl:     Insulin Infusion Pump KIT, 13 mm cannula, 23 inch tubing change site every 3 days, Disp: , Rfl:     Insulin Pen Needle (BD PEN NEEDLE NASIM U/F) 32G X 4 MM MISC, by Does not apply route 4 (four) times a day for 180 days, Disp: 400 each, Rfl: 3    Insulin Syringe-Needle U-100 31G X 5/16" 0 5 ML MISC, Use to draw up your insulin, Disp: , Rfl:     Lancets MISC, Use, Disp: , Rfl:     levothyroxine 25 mcg tablet, Take 1 tablet (25 mcg total) by mouth daily, Disp: 60 tablet, Rfl: 0    magnesium oxide (MAG-OX) 400 mg, Take 1 tablet (400 mg total) by mouth daily, Disp: 90 tablet, Rfl: 0    Multiple Vitamin (DAILY VALUE MULTIVITAMIN) TABS, Take by mouth daily , Disp: , Rfl:     ONETOUCH DELICA LANCETS 80J MISC, Patient test 6 times daily, Disp: 600 each, Rfl: 1    ONETOUCH VERIO test strip, Test six times a day, Disp: 600 each, Rfl: 3      Objective:    Vitals:    06/01/21 0850   BP: 142/88   Pulse: 83   Weight: 73 5 kg (162 lb)       Physical Exam  General: Well appearing, well nourished, in no distress  Oriented x 3, normal mood and affect  Ambulating without difficulty  Skin: Good turgor, no rash, unusual bruising or prominent lesions  Hair: Normal texture and distribution  Nails: Normal color, no deformities  HEENT:  Head: Normocephalic, atraumatic  Eyes: Conjunctiva clear, sclera non-icteric, EOM intact  Extremities: No amputations or deformities, cyanosis, edema  Musculoskeletal:   Hands - unremarkable  Wrists - tender bilaterally and pain on range of motion, but there is no soft tissue swelling, warmth or erythema noted  Elbows and shoulders - unremarkable    Knees - unremarkable  Ankles - mild soft tissue swelling noted bilaterally with tenderness  Feet - negative MTP squeeze test bilaterally  No enthesitis or dactylitis  Diffuse mild myofascial tenderness noted on palpation of the muscles of her upper and lower extremities  Neurologic: Alert and oriented  No focal neurological deficits appreciated  Psychiatric: Normal mood and affect  DEVONTE Alonzo    Rheumatology

## 2021-06-01 ENCOUNTER — HOSPITAL ENCOUNTER (OUTPATIENT)
Dept: RADIOLOGY | Facility: HOSPITAL | Age: 26
Discharge: HOME | End: 2021-06-01
Payer: COMMERCIAL

## 2021-06-01 ENCOUNTER — OFFICE VISIT (OUTPATIENT)
Dept: RHEUMATOLOGY | Facility: CLINIC | Age: 26
End: 2021-06-01
Payer: COMMERCIAL

## 2021-06-01 ENCOUNTER — APPOINTMENT (OUTPATIENT)
Dept: LAB | Facility: CLINIC | Age: 26
End: 2021-06-01
Payer: COMMERCIAL

## 2021-06-01 VITALS
WEIGHT: 162 LBS | BODY MASS INDEX: 30.61 KG/M2 | SYSTOLIC BLOOD PRESSURE: 142 MMHG | DIASTOLIC BLOOD PRESSURE: 88 MMHG | HEART RATE: 83 BPM

## 2021-06-01 DIAGNOSIS — E55.9 VITAMIN D DEFICIENCY: ICD-10-CM

## 2021-06-01 DIAGNOSIS — R76.8 POSITIVE ANA (ANTINUCLEAR ANTIBODY): Primary | ICD-10-CM

## 2021-06-01 DIAGNOSIS — M79.10 MYALGIA: ICD-10-CM

## 2021-06-01 DIAGNOSIS — E10.9 TYPE 1 DIABETES MELLITUS WITHOUT COMPLICATION (HCC): ICD-10-CM

## 2021-06-01 DIAGNOSIS — E06.3 HASHIMOTO'S DISEASE: ICD-10-CM

## 2021-06-01 DIAGNOSIS — R76.8 ELEVATED RHEUMATOID FACTOR: ICD-10-CM

## 2021-06-01 DIAGNOSIS — M25.50 DIFFUSE ARTHRALGIA: ICD-10-CM

## 2021-06-01 DIAGNOSIS — R76.8 POSITIVE ANA (ANTINUCLEAR ANTIBODY): ICD-10-CM

## 2021-06-01 LAB
25(OH)D3 SERPL-MCNC: 29.4 NG/ML (ref 30–100)
BACTERIA UR QL AUTO: ABNORMAL /HPF
BILIRUB UR QL STRIP: NEGATIVE
C3 SERPL-MCNC: 116 MG/DL (ref 90–180)
C4 SERPL-MCNC: 19 MG/DL (ref 10–40)
CK SERPL-CCNC: 82 U/L (ref 26–192)
CLARITY UR: CLEAR
COLOR UR: YELLOW
CREAT UR-MCNC: 138 MG/DL
GLUCOSE UR STRIP-MCNC: NEGATIVE MG/DL
HGB UR QL STRIP.AUTO: NEGATIVE
KETONES UR STRIP-MCNC: NEGATIVE MG/DL
LEUKOCYTE ESTERASE UR QL STRIP: ABNORMAL
NITRITE UR QL STRIP: NEGATIVE
NON-SQ EPI CELLS URNS QL MICRO: ABNORMAL /HPF
PH UR STRIP.AUTO: 6 [PH]
PROT UR STRIP-MCNC: NEGATIVE MG/DL
PROT UR-MCNC: 12 MG/DL
PROT/CREAT UR: 0.09 MG/G{CREAT} (ref 0–0.1)
RBC #/AREA URNS AUTO: ABNORMAL /HPF
SP GR UR STRIP.AUTO: 1.02 (ref 1–1.03)
UROBILINOGEN UR QL STRIP.AUTO: 0.2 E.U./DL
WBC #/AREA URNS AUTO: ABNORMAL /HPF

## 2021-06-01 PROCEDURE — 73630 X-RAY EXAM OF FOOT: CPT

## 2021-06-01 PROCEDURE — 84156 ASSAY OF PROTEIN URINE: CPT | Performed by: INTERNAL MEDICINE

## 2021-06-01 PROCEDURE — 82306 VITAMIN D 25 HYDROXY: CPT

## 2021-06-01 PROCEDURE — 85613 RUSSELL VIPER VENOM DILUTED: CPT

## 2021-06-01 PROCEDURE — 73130 X-RAY EXAM OF HAND: CPT

## 2021-06-01 PROCEDURE — 85705 THROMBOPLASTIN INHIBITION: CPT

## 2021-06-01 PROCEDURE — 82570 ASSAY OF URINE CREATININE: CPT | Performed by: INTERNAL MEDICINE

## 2021-06-01 PROCEDURE — 3061F NEG MICROALBUMINURIA REV: CPT | Performed by: INTERNAL MEDICINE

## 2021-06-01 PROCEDURE — 36415 COLL VENOUS BLD VENIPUNCTURE: CPT

## 2021-06-01 PROCEDURE — 85732 THROMBOPLASTIN TIME PARTIAL: CPT

## 2021-06-01 PROCEDURE — 1036F TOBACCO NON-USER: CPT | Performed by: INTERNAL MEDICINE

## 2021-06-01 PROCEDURE — 86146 BETA-2 GLYCOPROTEIN ANTIBODY: CPT

## 2021-06-01 PROCEDURE — 82550 ASSAY OF CK (CPK): CPT

## 2021-06-01 PROCEDURE — 81001 URINALYSIS AUTO W/SCOPE: CPT | Performed by: INTERNAL MEDICINE

## 2021-06-01 PROCEDURE — 85670 THROMBIN TIME PLASMA: CPT

## 2021-06-01 PROCEDURE — 86200 CCP ANTIBODY: CPT

## 2021-06-01 PROCEDURE — 86147 CARDIOLIPIN ANTIBODY EA IG: CPT

## 2021-06-01 PROCEDURE — 81374 HLA I TYPING 1 ANTIGEN LR: CPT

## 2021-06-01 PROCEDURE — 86235 NUCLEAR ANTIGEN ANTIBODY: CPT

## 2021-06-01 PROCEDURE — 99245 OFF/OP CONSLTJ NEW/EST HI 55: CPT | Performed by: INTERNAL MEDICINE

## 2021-06-01 PROCEDURE — 86160 COMPLEMENT ANTIGEN: CPT

## 2021-06-02 ENCOUNTER — TELEMEDICINE (OUTPATIENT)
Dept: BEHAVIORAL/MENTAL HEALTH CLINIC | Facility: CLINIC | Age: 26
End: 2021-06-02
Payer: COMMERCIAL

## 2021-06-02 DIAGNOSIS — F42.2 MIXED OBSESSIONAL THOUGHTS AND ACTS: Primary | ICD-10-CM

## 2021-06-02 DIAGNOSIS — F41.1 GENERALIZED ANXIETY DISORDER WITH PANIC ATTACKS: ICD-10-CM

## 2021-06-02 DIAGNOSIS — F41.0 GENERALIZED ANXIETY DISORDER WITH PANIC ATTACKS: ICD-10-CM

## 2021-06-02 DIAGNOSIS — F43.12 CHRONIC POST-TRAUMATIC STRESS DISORDER (PTSD): ICD-10-CM

## 2021-06-02 LAB
APTT SCREEN TO CONFIRM RATIO: 1.09 RATIO (ref 0–1.4)
CENTROMERE B AB SER-ACNC: <0.2 AI (ref 0–0.9)
CONFIRM APTT/NORMAL: 31.8 SEC (ref 0–55)
ENA JO1 AB SER-ACNC: <0.2 AI (ref 0–0.9)
ENA RNP AB SER-ACNC: 0.2 AI (ref 0–0.9)
ENA SCL70 AB SER-ACNC: <0.2 AI (ref 0–0.9)
ENA SM AB SER-ACNC: <0.2 AI (ref 0–0.9)
LA PPP-IMP: NORMAL
SCREEN APTT: 35.6 SEC (ref 0–51.9)
SCREEN DRVVT: 33.7 SEC (ref 0–47)
THROMBIN TIME: 17.8 SEC (ref 0–23)

## 2021-06-02 PROCEDURE — 90834 PSYTX W PT 45 MINUTES: CPT | Performed by: SOCIAL WORKER

## 2021-06-02 NOTE — PSYCH
This note was not shared with the patient due to this is a psychotherapy note    Virtual Regular Visit      Assessment/Plan:    Problem List Items Addressed This Visit     None          Goals addressed in session: Goal 1 and Goal 2          Reason for visit is No chief complaint on file  Encounter provider Paras Juan    Provider located at [address] [place] [id number] [phone]      Recent Visits  Date Type Provider Dept   05/26/21 [facility] Therapist Ivan   Showing recent visits within past 7 days and meeting all other requirements     Today's Visits  Date Type Provider Dept   06/02/21 [facility] Therapist Ivan   Showing today's visits and meeting all other requirements     Future Appointments  No visits were found meeting these conditions  Showing future appointments within next 150 days and meeting all other requirements        The patient was identified by name and date of birth  Carly Wang was informed that this is a telemedicine visit and that the visit is being conducted through [address] [place] [address] Now and patient was informed that this is a secure, HIPAA-compliant platform  She agrees to proceed     My office door was closed  No one else was in the room  She acknowledged consent and understanding of privacy and security of the video platform  The patient has agreed to participate and understands they can discontinue the visit at any time  Patient is aware this is a billable service  Subjective  Carly Wang is a 27 y o  female    D: Met with Isaiah Olmos individually  Session focused upon recent Dr Pasha Bonilla where Isaiah Olmos felt heard about her presenting symptoms  One   Was concerned that certain blood tests weren't pursued further  Attending the gym and Roel Avelar recently joined  Relationship with him is 'good and bad'  Discussed  's view that Bipolar and Hypothyroidism goes hand in hand and can 'through one or the other off very quickly ' Denied SI    A: Adriel Pelletier presented with current baseline mood and affect though she disagrees  Feels heard from above  's     P: Continue individual therapy      HPI     Past Medical History:   Diagnosis Date    Abdominal pain     Anxiety     Anxiety and depression     COVID-19 12/2020    Depression     Eating disorder     history of anorexia/bulemia [id number]    Fracture of fibula     R Salter I   Danella Goltz disease     Hashimoto's disease 2/21/2020    Head injury     Nasal congestion     PTSD (post-traumatic stress disorder)     Rectal bleed     Seizures (HCC)        Past Surgical History:   Procedure Laterality Date    KNEE SURGERY Right 07/06/2020    NASAL SEPTOPLASTY W/ TURBINOPLASTY      SINUS SURGERY      TURBINOPLASTY N/A 3/22/2021    Procedure: Willian Albarran;  Surgeon: Joe Vera MD;  [facility];  Service: ENT    WISDOM TOOTH EXTRACTION      WRIST SURGERY      left;  Excision of ganglion       Current Outpatient Medications   Medication Sig Dispense Refill    ACETONE, URINE, TEST VI Use as needed to test your urine for ketones      albuterol (PROVENTIL HFA,VENTOLIN HFA) 90 mcg/act inhaler Inhale 1 puff every 6 (six) hours as needed for wheezing or shortness of breath 1 Inhaler 5    Altavera 0 15-30 MG-MCG per tablet TAKE ONE TABLET BY MOUTH EVERY DAY 84 tablet 3    amitriptyline (ELAVIL) 10 mg tablet Take 1 tablet (10 mg total) by mouth daily at bedtime 30 tablet 2    ARIPiprazole (ABILIFY) 10 mg tablet Take 1 tablet (10 mg total) by mouth daily 30 tablet 2    Blood Glucose Monitoring Suppl (ONETOUCH VERIO) w/Device KIT Use as directed  0    Cholecalciferol (Vitamin D3) 1 25 MG (44176 UT) CAPS Take 50,000 Units by mouth once a week       Cholecalciferol (Vitamin D3) 50 MCG (2000 UT) TABS Take 2,000 Units by mouth daily 6 days a week      clonazePAM (KlonoPIN) 0 5 mg tablet Take 1 tablet (0 5 mg total) by mouth 2 (two) times a day 45 tablet 2    desvenlafaxine succinate (PRISTIQ) 50 mg 24 hr tablet Take 1 tablet (50 mg total) by mouth daily 30 tablet 2    divalproex sodium (DEPAKOTE ER) 500 mg 24 hr tablet Take 2 tablets (1,000 mg total) by mouth daily 60 tablet 2    EPINEPHrine (EPIPEN) 0 3 mg/0 3 mL SOAJ Inject 0 3 mL (0 3 mg total) into a muscle once for 1 dose 0 6 mL 0    ergocalciferol (VITAMIN D2) 50,000 units 50,000 Units once a week      folic acid (FOLVITE) 1 mg tablet Take 1 mg by mouth daily      glucagon (GLUCAGON EMERGENCY) 1 MG injection Inject 1 mg under the skin once as needed for low blood sugar for up to 2 doses 1 kit 1    insulin aspart (NovoLOG) 100 units/mL injection Use 30 units per day via pump Disp 1 vial per month (Patient taking differently: Use 100units per day via pump Disp 2 vial per month) 30 mL 1    insulin degludec (TRESIBA) 100 units/mL injection pen 34 units once daily E10 65      Insulin Infusion Pump KIT 13 mm cannula, 23 inch tubing change site every 3 days      Insulin Pen Needle (BD PEN NEEDLE NASIM U/F) 32G X 4 MM MISC by Does not apply route 4 (four) times a day for 180 days 400 each 3    Insulin Syringe-Needle U-100 31G X 5/16" 0 5 ML MISC Use to draw up your insulin      Lancets MISC Use      levothyroxine 25 mcg tablet Take 1 tablet (25 mcg total) by mouth daily 60 tablet 0    magnesium oxide (MAG-OX) 400 mg Take 1 tablet (400 mg total) by mouth daily 90 tablet 0    Multiple Vitamin (DAILY VALUE MULTIVITAMIN) TABS Take by mouth daily       ONETOUCH DELICA LANCETS 97N MISC Patient test 6 times daily 600 each 1    ONETOUCH VERIO test strip Test six times a day 600 each 3     No current facility-administered medications for this visit           Allergies   Allergen Reactions    Iodine - Food Allergy Throat Swelling    Insulin Glargine Other (See Comments)     Burning and redness under skin       Review of Systems    Video Exam    There were no vitals filed for this visit  Physical Exam     I spent 50 minutes directly with the patient during this visit      VIRTUAL VISIT DISCLAIMER    Lambeth Tyrone acknowledges that she has consented to an online visit or consultation  She understands that the online visit is based solely on information provided by her, and that, in the absence of a face-to-face physical evaluation by the physician, the diagnosis she receives is both limited and provisional in terms of accuracy and completeness  This is not intended to replace a full medical face-to-face evaluation by the physician  Yevgeniy Hansen understands and accepts these terms

## 2021-06-03 LAB
B2 GLYCOPROT1 IGA SERPL IA-ACNC: 5.1
B2 GLYCOPROT1 IGG SERPL IA-ACNC: 0.7
B2 GLYCOPROT1 IGM SERPL IA-ACNC: <2.9
CARDIOLIPIN IGA SER IA-ACNC: 8.6
CARDIOLIPIN IGG SER IA-ACNC: 0.8
CARDIOLIPIN IGM SER IA-ACNC: 2.3
CCP AB SER IA-ACNC: <0.4

## 2021-06-07 ENCOUNTER — TELEPHONE (OUTPATIENT)
Dept: NEUROLOGY | Facility: CLINIC | Age: 26
End: 2021-06-07

## 2021-06-07 DIAGNOSIS — M62.838 MUSCLE SPASMS OF BOTH LOWER EXTREMITIES: Primary | ICD-10-CM

## 2021-06-07 DIAGNOSIS — E10.9 TYPE 1 DIABETES MELLITUS WITHOUT COMPLICATION (HCC): ICD-10-CM

## 2021-06-07 DIAGNOSIS — R20.0 NUMBNESS AND TINGLING OF BOTH LEGS BELOW KNEES: ICD-10-CM

## 2021-06-07 DIAGNOSIS — R20.2 NUMBNESS AND TINGLING OF BOTH LEGS BELOW KNEES: ICD-10-CM

## 2021-06-07 DIAGNOSIS — G62.9 NEUROPATHY: ICD-10-CM

## 2021-06-07 NOTE — TELEPHONE ENCOUNTER
Called pt  She reports the below symptoms have been occurring for a couple weeks now  Reports that aside from numbness spreading up her left leg to her hip, she also feels that the leg is hot  Reports it feels like it is on fire up to her knee  Leg is not any warmer to the touch than normal, just has the burning sensation  The feeling is much more intense now  Denies any further sensory changes other than what is described  Pt reports that after changing out of one of the below positions, the numbness sensation will linger for a long time  Notices the sensation a lot more when she is laying down  Notes continued joint pain and muscle weakness unchanged  Please advise  [phone]  Okay to leave detailed message

## 2021-06-07 NOTE — TELEPHONE ENCOUNTER
----- Message from Dhruv Alexander RN sent at 6/7/2021  7:58 AM EDT -----  Regarding: FW: Non-Urgent Medical Question  Contact: [phone]    ----- Message -----  From: Lee Edwards  Sent: 6/5/2021  11:00 AM EDT  To: [facility]  Subject: Non-Urgent Medical Question                      Hello,  The numbness I have on my left leg from my knee down, it feels like it's getting worse  At night when I'm laying down or sitting with my legs straight, my whole leg going to my hip is numb  I'm not sure what's going on but it is really bothering me and making me nervous  I change positions a lot and none of them make it better     Thank you,  [phone] [employer]

## 2021-06-07 NOTE — TELEPHONE ENCOUNTER
Considering patient complaints - warm lower extremity suggest vascular or inflammatory origin, patient is to reach primary care team   From neurology prospective - extensive evaluation completed and negative for CNS/PNS dysfunction, normal MRI B/C and EMG/NCS      At this point, patient will be offered MRI B/T STAT/Urgent - please proceed with PA and scheduling

## 2021-06-07 NOTE — TELEPHONE ENCOUNTER
My apologies, to clarify- pt's leg is not warmer to the touch than the rest of her skin  Called pt and made her aware of the below  She is aware to contact her PCP with any actual warmth of her leg (not just burning sensation)  Pt is agreeable to STAT MRIs  Last Cr and BUN in the last 6 weeks  Called CS with pt on the line and spoke with [facility]  Pt is scheduled for soonest available STAT appt in [facility] on 6/10/21 at 6:45pm  Pt was agreeable to this scheduling  Dr Barb Mosley, just an FYI

## 2021-06-09 ENCOUNTER — SOCIAL WORK (OUTPATIENT)
Dept: BEHAVIORAL/MENTAL HEALTH CLINIC | Facility: CLINIC | Age: 26
End: 2021-06-09
Payer: COMMERCIAL

## 2021-06-09 DIAGNOSIS — F43.12 CHRONIC POST-TRAUMATIC STRESS DISORDER (PTSD): ICD-10-CM

## 2021-06-09 DIAGNOSIS — F41.1 GENERALIZED ANXIETY DISORDER WITH PANIC ATTACKS: ICD-10-CM

## 2021-06-09 DIAGNOSIS — F42.2 MIXED OBSESSIONAL THOUGHTS AND ACTS: Primary | ICD-10-CM

## 2021-06-09 DIAGNOSIS — F31.63 BIPOLAR DISORDER, CURRENT EPISODE MIXED, SEVERE, WITHOUT PSYCHOTIC FEATURES (HCC): ICD-10-CM

## 2021-06-09 DIAGNOSIS — F41.0 GENERALIZED ANXIETY DISORDER WITH PANIC ATTACKS: ICD-10-CM

## 2021-06-09 PROCEDURE — 90834 PSYTX W PT 45 MINUTES: CPT | Performed by: SOCIAL WORKER

## 2021-06-09 NOTE — PSYCH
This note was not shared with the patient due to this is a psychotherapy note      Virtual Regular Visit      Assessment/Plan:    Problem List Items Addressed This Visit        Other    Chronic post-traumatic stress disorder (PTSD)    Generalized anxiety disorder with panic attacks    OCD (obsessive compulsive disorder) - Primary    Bipolar affective disorder ([facility] [address] )          Goals addressed in session: Goal 1 and Goal 2          Reason for visit is No chief complaint on file  Encounter provider Carly Jiménez    Provider located at [address] [place] [id number] [phone]      Recent Visits  Date Type Provider Dept   06/02/21 [facility] Therapist [facility]   Showing recent visits within past 7 days and meeting all other requirements     Future Appointments  No visits were found meeting these conditions  Showing future appointments within next 150 days and meeting all other requirements        The patient was identified by name and date of birth  Herman Jama was informed that this is a telemedicine visit and that the visit is being conducted through [address] [place] [address] Road Now and patient was informed that this is a secure, HIPAA-compliant platform  She agrees to proceed     My office door was closed  No one else was in the room  She acknowledged consent and understanding of privacy and security of the video platform  The patient has agreed to participate and understands they can discontinue the visit at any time  Patient is aware this is a billable service  Subjective  Herman Jama is a 27 y o  female    D: Met with Kenny Brown individually  Struggling to leave home some days  Other days 'with Meagan Mota pushing me' we go swimming st the gym   "fights' to go straight home afterwards - doesn't want to be around others - 'overstimulated'    Conitnues to struggle with anxiety - especially in grocery store  Went for a long drive yesterday  People removing masks uncomfortable 'I'm just not used to it '  Doesn't know if going back on campus in Fall  'Little tiny things are stressing me out '  Meagan Mota may go home for the Fall- frustrated and wants him to go  Denied SI    A: [facility] presented more focused and engaged today  Affect a bit brighter  Goal orientated in small steps - progress    P: Conitnue individual therapy      HPI     Past Medical History:   Diagnosis Date    Abdominal pain     Anxiety     Anxiety and depression     COVID-19 12/2020    Depression     Eating disorder     history of anorexia/bulemia [id number]    Fracture of fibula     R Salter I   Joel Sailors disease     Hashimoto's disease 2/21/2020    Head injury     Nasal congestion     PTSD (post-traumatic stress disorder)     Rectal bleed     Seizures (HCC)        Past Surgical History:   Procedure Laterality Date    KNEE SURGERY Right 07/06/2020    NASAL SEPTOPLASTY W/ TURBINOPLASTY      SINUS SURGERY      TURBINOPLASTY N/A 3/22/2021    Procedure: TURBINOPLASTY;  Surgeon: Bebeto Flores MD;  [facility];  Service: ENT    WISDOM TOOTH EXTRACTION      WRIST SURGERY      left;  Excision of ganglion       Current Outpatient Medications   Medication Sig Dispense Refill    ACETONE, URINE, TEST VI Use as needed to test your urine for ketones      albuterol (PROVENTIL HFA,VENTOLIN HFA) 90 mcg/act inhaler Inhale 1 puff every 6 (six) hours as needed for wheezing or shortness of breath 1 Inhaler 5    Altavera 0 15-30 MG-MCG per tablet TAKE ONE TABLET BY MOUTH EVERY DAY 84 tablet 3    amitriptyline (ELAVIL) 10 mg tablet Take 1 tablet (10 mg total) by mouth daily at bedtime 30 tablet 2    ARIPiprazole (ABILIFY) 10 mg tablet Take 1 tablet (10 mg total) by mouth daily 30 tablet 2    Blood Glucose Monitoring Suppl (ONETOUCH VERIO) w/Device KIT Use as directed  0    Cholecalciferol (Vitamin D3) 1 25 MG (65557 UT) CAPS Take 50,000 Units by mouth once a week       Cholecalciferol (Vitamin D3) 50 MCG (2000 UT) TABS Take 2,000 Units by mouth daily 6 days a week      clonazePAM (KlonoPIN) 0 5 mg tablet Take 1 tablet (0 5 mg total) by mouth 2 (two) times a day 45 tablet 2    desvenlafaxine succinate (PRISTIQ) 50 mg 24 hr tablet Take 1 tablet (50 mg total) by mouth daily 30 tablet 2    divalproex sodium (DEPAKOTE ER) 500 mg 24 hr tablet Take 2 tablets (1,000 mg total) by mouth daily 60 tablet 2    EPINEPHrine (EPIPEN) 0 3 mg/0 3 mL SOAJ Inject 0 3 mL (0 3 mg total) into a muscle once for 1 dose 0 6 mL 0    ergocalciferol (VITAMIN D2) 50,000 units 50,000 Units once a week      folic acid (FOLVITE) 1 mg tablet Take 1 mg by mouth daily      glucagon (GLUCAGON EMERGENCY) 1 MG injection Inject 1 mg under the skin once as needed for low blood sugar for up to 2 doses 1 kit 1    insulin aspart (NovoLOG) 100 units/mL injection Use 30 units per day via pump Disp 1 vial per month (Patient taking differently: Use 100units per day via pump Disp 2 vial per month) 30 mL 1    insulin degludec (TRESIBA) 100 units/mL injection pen 34 units once daily E10 65      Insulin Infusion Pump KIT 13 mm cannula, 23 inch tubing change site every 3 days      Insulin Pen Needle (BD PEN NEEDLE NASIM U/F) 32G X 4 MM MISC by Does not apply route 4 (four) times a day for 180 days 400 each 3    Insulin Syringe-Needle U-100 31G X 5/16" 0 5 ML MISC Use to draw up your insulin      Lancets MISC Use      levothyroxine 25 mcg tablet Take 1 tablet (25 mcg total) by mouth daily 60 tablet 0    magnesium oxide (MAG-OX) 400 mg Take 1 tablet (400 mg total) by mouth daily 90 tablet 0    Multiple Vitamin (DAILY VALUE MULTIVITAMIN) TABS Take by mouth daily       ONETOUCH DELICA LANCETS 93M MISC Patient test 6 times daily 600 each 1    ONETOUCH VERIO test strip Test six times a day 600 each 3     No current facility-administered medications for this visit  Allergies   Allergen Reactions    Iodine - Food Allergy Throat Swelling    Insulin Glargine Other (See Comments)     Burning and redness under skin       Review of Systems        I spent 50 minutes directly with the patient during this visit      VIRTUAL VISIT DISCLAIMER    Arcelia Villa acknowledges that she has consented to an online visit or consultation  She understands that the online visit is based solely on information provided by her, and that, in the absence of a face-to-face physical evaluation by the physician, the diagnosis she receives is both limited and provisional in terms of accuracy and completeness  This is not intended to replace a full medical face-to-face evaluation by the physician  Arcelia Villa understands and accepts these terms

## 2021-06-10 ENCOUNTER — HOSPITAL ENCOUNTER (OUTPATIENT)
Dept: RADIOLOGY | Facility: HOSPITAL | Age: 26
Discharge: HOME | End: 2021-06-10
Attending: PSYCHIATRY & NEUROLOGY
Payer: COMMERCIAL

## 2021-06-10 DIAGNOSIS — G62.9 NEUROPATHY: ICD-10-CM

## 2021-06-10 DIAGNOSIS — E10.9 TYPE 1 DIABETES MELLITUS WITHOUT COMPLICATION (HCC): ICD-10-CM

## 2021-06-10 DIAGNOSIS — M62.838 MUSCLE SPASMS OF BOTH LOWER EXTREMITIES: ICD-10-CM

## 2021-06-10 LAB — HLA-B27 QL NAA+PROBE: NEGATIVE

## 2021-06-10 PROCEDURE — 70553 MRI BRAIN STEM W/O & W/DYE: CPT

## 2021-06-10 PROCEDURE — 72157 MRI CHEST SPINE W/O & W/DYE: CPT

## 2021-06-10 PROCEDURE — G1004 CDSM NDSC: HCPCS

## 2021-06-10 PROCEDURE — A9585 GADOBUTROL INJECTION: HCPCS | Performed by: PSYCHIATRY & NEUROLOGY

## 2021-06-10 RX ADMIN — GADOBUTROL 7 ML: 604.72 INJECTION INTRAVENOUS at 20:26

## 2021-06-14 ENCOUNTER — TELEPHONE (OUTPATIENT)
Dept: PSYCHIATRY | Facility: CLINIC | Age: 26
End: 2021-06-14

## 2021-06-14 NOTE — TELEPHONE ENCOUNTER
When making confirmation calls, [facility] cancelled her appointment for 6/16 at 3pm with sufficient notice   She said something came up

## 2021-06-28 ENCOUNTER — OFFICE VISIT (OUTPATIENT)
Dept: INTERNAL MEDICINE CLINIC | Facility: CLINIC | Age: 26
End: 2021-06-28
Payer: COMMERCIAL

## 2021-06-28 VITALS
HEART RATE: 82 BPM | WEIGHT: 162 LBS | RESPIRATION RATE: 18 BRPM | SYSTOLIC BLOOD PRESSURE: 116 MMHG | OXYGEN SATURATION: 98 % | DIASTOLIC BLOOD PRESSURE: 82 MMHG | HEIGHT: 61 IN | BODY MASS INDEX: 30.58 KG/M2

## 2021-06-28 DIAGNOSIS — R20.2 PARESTHESIA OF LEFT LEG: ICD-10-CM

## 2021-06-28 DIAGNOSIS — K90.9 FATTY STOOLS: ICD-10-CM

## 2021-06-28 DIAGNOSIS — E10.65 TYPE 1 DIABETES MELLITUS WITH HYPERGLYCEMIA (HCC): ICD-10-CM

## 2021-06-28 DIAGNOSIS — M54.50 LEFT LOW BACK PAIN, UNSPECIFIED CHRONICITY, UNSPECIFIED WHETHER SCIATICA PRESENT: ICD-10-CM

## 2021-06-28 DIAGNOSIS — Z00.00 ANNUAL PHYSICAL EXAM: Primary | ICD-10-CM

## 2021-06-28 DIAGNOSIS — K92.1 BLOOD IN THE STOOL: ICD-10-CM

## 2021-06-28 DIAGNOSIS — E06.3 HASHIMOTO'S DISEASE: ICD-10-CM

## 2021-06-28 PROCEDURE — 99395 PREV VISIT EST AGE 18-39: CPT | Performed by: NURSE PRACTITIONER

## 2021-06-28 PROCEDURE — 3008F BODY MASS INDEX DOCD: CPT | Performed by: INTERNAL MEDICINE

## 2021-06-28 NOTE — PROGRESS NOTES
[address] [facility] INTERNAL MEDICINE    NAME: Raymundo Lockett  AGE: 27 y o  SEX: female  : 1995     DATE: 2021     Assessment and Plan:     Problem List Items Addressed This Visit        Endocrine    Hashimoto's disease     Started on levothyroxine 25 mcg at her last visit due to her ongoing symptoms of thyroid dysfunction and hx of hashimotos with positive antibodies  She was seen in consultation with Dr Blaise Neil recently; had been seeing him in the past until insurance change required her to change providers  He agreed that she should be on medication life-long  She does not note a change in sx since starting and Kendall feels dose should likely go up, but wants labs done prior  He gave her a list of recommended testing that her endo should perform as well as recommendation for thyroid us  Pt will be reviewing his recommendations with endo at upcoming appointment  Type 1 diabetes mellitus with hyperglycemia ([facility] [address] )     Endocrinology at , blood glucose has been running high, >300 requiring up to 45 units of her insulin to get sugars down overnight  Endo aware, she is already scheduled for follow up  Most recent A1C was in April and was 7 9  Eye exam up to date, foot exam done today  Follow instructions per endo  Lab Results   Component Value Date    HGBA1C 6 8 (H) 2020               Other    Annual physical exam - Primary     Immunizations are up to date  Pt is up to date with eye exams, dental exams  Continue routine gyn exams  Pt getting some exercise most days of the week, reinforced healthy diet, diabetic diet  Blood in the stool     Problem present since 2019 and has previously been evaluated with GI  She has previously had normal colonoscopy, negative celiac, negative h  Pylori, no ulcers  She did have gastritis which they treated with protonix for 3 months    Pt was told to f/u with GI if this persisted  She is in need of a new referral, which was provided today  Relevant Orders    Ambulatory referral to Gastroenterology    Fatty stools     Pt reports fatty/oily stools with blood intermittently  As discussed above, pt is referred to GI for f/u and reassessment  Relevant Orders    Ambulatory referral to Gastroenterology    Left low back pain     Pt c/o left sided low back pain with associated numbness in the LLE  Pt referred for evaluation with PT  Relevant Orders    Ambulatory referral to Physical Therapy    Paresthesia of left leg     LLE numbness continues  She was seen by neuro regarding this and MRI was repeated  No change from previous  She had normal EMG done in 2019  Strength is good bilaterally, able to ambulate without problem  She does seem to have an underlying neuropathy with heat/burning in both feet but the numbness may possilby be related to low back pain/sciatica  Referred for further evaluation with PT  Relevant Orders    Ambulatory referral to Physical Therapy          Immunizations and preventive care screenings were discussed with patient today  Appropriate education was printed on patient's after visit summary  Counseling:  Dental Health: discussed importance of regular tooth brushing, flossing, and dental visits  Injury prevention: discussed safety/seat belts, safety helmets, smoke detectors, carbon dioxide detectors, and smoking near bedding or upholstery  · Exercise: the importance of regular exercise/physical activity was discussed  Recommend exercise 3-5 times per week for at least 30 minutes  No follow-ups on file  Chief Complaint:     Chief Complaint   Patient presents with    Physical Exam    Toe Pain     great toe      History of Present Illness:     Adult Annual Physical   Patient here for a comprehensive physical exam  The patient reports problems - L great toe bruising      Diet and Physical Activity  · Diet/Nutrition: well balanced diet and follows diabetic diet  · Exercise: 5-7 times a week on average and less than 30 minutes on average  Depression Screening  PHQ-9 Depression Screening    PHQ-9:   Frequency of the following problems over the past two weeks:           General Health  · Sleep: sleeps well, gets more than 8 hours of sleep on average and 10-12 hours  · Hearing: normal - bilateral   · Vision: goes for regular eye exams, most recent eye exam <1 year ago and floaters  · Dental: regular dental visits and brushes teeth twice daily  /GYN Health  · Last menstrual period: 8/2019  · Contraceptive method: barrier methods  · History of STDs?: no      Review of Systems:     Review of Systems   Constitutional: Negative for activity change, appetite change, chills, fatigue, fever and unexpected weight change  HENT: Negative for hearing loss  Eyes: Negative for visual disturbance  Respiratory: Negative for cough, chest tightness and shortness of breath  Cardiovascular: Negative for chest pain, palpitations and leg swelling  Gastrointestinal: Positive for blood in stool (fatty, oily stool; blood occasionally)  Negative for abdominal pain, constipation, diarrhea, nausea and vomiting  Genitourinary: Positive for menstrual problem  Negative for difficulty urinating, dysuria and frequency  Musculoskeletal: Negative for arthralgias and myalgias  Skin: Positive for color change (bruising L great toe)  Allergic/Immunologic: Negative for environmental allergies  Neurological: Negative for dizziness, weakness, numbness and headaches  Psychiatric/Behavioral: Negative for sleep disturbance  The patient is not nervous/anxious         Past Medical History:     Past Medical History:   Diagnosis Date    Abdominal pain     Anxiety     Anxiety and depression     COVID-19 12/2020    Depression     Eating disorder     history of anorexia/bulemia [id number]    Fracture of fibula     R Salter I   Arlyce Gill disease     Hashimoto's disease 2/21/2020    Head injury     Nasal congestion     PTSD (post-traumatic stress disorder)     Rectal bleed     Seizures (HCC)       Past Surgical History:     Past Surgical History:   Procedure Laterality Date    KNEE SURGERY Right 07/06/2020    NASAL SEPTOPLASTY W/ TURBINOPLASTY      SINUS SURGERY      TURBINOPLASTY N/A 3/22/2021    Procedure: Leslie Bear;  Surgeon: Hannah Sandoval MD;  [facility];  Service: ENT    WISDOM TOOTH EXTRACTION      WRIST SURGERY      left; Excision of ganglion      Social History:     Social History     Socioeconomic History    Marital status: Single     Spouse name: None    Number of children: 0    Years of education: None    Highest education level: Associate degree: academic program   Occupational History    Occupation: unemployed   Tobacco Use    Smoking status: Never Smoker    Smokeless tobacco: Never Used    Tobacco comment: Tobacco smoke exposure (Father smokes cigars)   Vaping Use    Vaping Use: Never used   Substance and Sexual Activity    Alcohol use: Never    Drug use: Never    Sexual activity: Not Currently     Partners: Male     Birth control/protection: Condom Male, OCP   Other Topics Concern    None   Social History Narrative    Student at [employer] a poor diet; low in vegetables, high in sweets    Dental care, regularly    Lives with parents    Sleeps 8-10 hours a day     Social Determinants of Health     Financial Resource Strain: Medium Risk    Difficulty of Paying Living Expenses: Somewhat hard   Food Insecurity:     Worried About Running Out of Food in the Last Year:     Ran Out of Food in the Last Year:    Transportation Needs:     Lack of Transportation (Medical):      Lack of Transportation (Non-Medical):    Physical Activity: Insufficiently Active    Days of Exercise per Week: 7 days    Minutes of Exercise per Session: 10 min   Stress: Stress Concern Present    Feeling of Stress : Rather much   Social Connections: Unknown    Frequency of Communication with Friends and Family: More than three times a week    Frequency of Social Gatherings with Friends and Family: Not on file    Attends [employer] Services: Not on file    Active Member of Clubs or Organizations: No    Attends Club or Organization Meetings: Never    Marital Status: Never    Intimate Partner Violence: Not At Risk    Fear of Current or Ex-Partner: No    Emotionally Abused: No    Physically Abused: No    Sexually Abused: No      Family History:     Family History   Problem Relation Age of Onset    Hypertension Mother     Migraines Mother         Headache    Diabetes type II Mother     Varicose Veins Mother     Hyperlipidemia Mother     Diabetes Mother     Arthritis Mother     Depression Mother     Cholelithiasis Father     Hypertension Father     Sarcoidosis Father         Liver    Hyperlipidemia Father     Diabetes Father     Coronary artery disease Father     Nephrolithiasis Father     Cirrhosis Father     Alcohol abuse Father     Thyroid disease Sister     Cancer Family     Diabetes Family     Hypertension Family     Alcohol abuse Brother       Current Medications:     Current Outpatient Medications   Medication Sig Dispense Refill    ACETONE, URINE, TEST VI Use as needed to test your urine for ketones      albuterol (PROVENTIL HFA,VENTOLIN HFA) 90 mcg/act inhaler Inhale 1 puff every 6 (six) hours as needed for wheezing or shortness of breath 1 Inhaler 5    Altavera 0 15-30 MG-MCG per tablet TAKE ONE TABLET BY MOUTH EVERY DAY 84 tablet 3    ARIPiprazole (ABILIFY) 10 mg tablet Take 1 tablet (10 mg total) by mouth daily 30 tablet 2    Blood Glucose Monitoring Suppl (ONETOUCH VERIO) w/Device KIT Use as directed  0    Cholecalciferol (Vitamin D3) 1 25 MG (40931 UT) CAPS Take 50,000 Units by mouth once a week       Cholecalciferol (Vitamin D3) 50 MCG (2000 UT) TABS Take 2,000 Units by mouth daily 6 days a week      clonazePAM (KlonoPIN) 0 5 mg tablet Take 1 tablet (0 5 mg total) by mouth 2 (two) times a day 45 tablet 2    desvenlafaxine succinate (PRISTIQ) 50 mg 24 hr tablet Take 1 tablet (50 mg total) by mouth daily 30 tablet 2    divalproex sodium (DEPAKOTE ER) 500 mg 24 hr tablet Take 2 tablets (1,000 mg total) by mouth daily 60 tablet 2    ergocalciferol (VITAMIN D2) 50,000 units 50,000 Units once a week      folic acid (FOLVITE) 1 mg tablet Take 1 mg by mouth daily      glucagon (GLUCAGON EMERGENCY) 1 MG injection Inject 1 mg under the skin once as needed for low blood sugar for up to 2 doses 1 kit 1    insulin aspart (NovoLOG) 100 units/mL injection Use 30 units per day via pump Disp 1 vial per month (Patient taking differently: Use 100units per day via pump Disp 2 vial per month) 30 mL 1    insulin degludec (TRESIBA) 100 units/mL injection pen 34 units once daily E10 65      Insulin Infusion Pump KIT 13 mm cannula, 23 inch tubing change site every 3 days      Insulin Syringe-Needle U-100 31G X 5/16" 0 5 ML MISC Use to draw up your insulin      Lancets MISC Use      levothyroxine 25 mcg tablet Take 1 tablet (25 mcg total) by mouth daily 60 tablet 0    magnesium oxide (MAG-OX) 400 mg Take 1 tablet (400 mg total) by mouth daily 90 tablet 0    Multiple Vitamin (DAILY VALUE MULTIVITAMIN) TABS Take by mouth daily       ONETOUCH DELICA LANCETS 89Q MISC Patient test 6 times daily 600 each 1    ONETOUCH VERIO test strip Test six times a day 600 each 3    amitriptyline (ELAVIL) 10 mg tablet Take 1 tablet (10 mg total) by mouth daily at bedtime 30 tablet 2    EPINEPHrine (EPIPEN) 0 3 mg/0 3 mL SOAJ Inject 0 3 mL (0 3 mg total) into a muscle once for 1 dose 0 6 mL 0    Insulin Pen Needle (BD PEN NEEDLE NASIM U/F) 32G X 4 MM MISC by Does not apply route 4 (four) times a day for 180 days 400 each 3     No current facility-administered medications for this visit  Allergies: Allergies   Allergen Reactions    Iodine - Food Allergy Throat Swelling    Insulin Glargine Other (See Comments)     Burning and redness under skin      Physical Exam:     /82   Pulse 82   Resp 18   Ht 5' 1" (1 549 m)   Wt 73 5 kg (162 lb)   SpO2 98%   BMI 30 61 kg/m²     Physical Exam  Vitals reviewed  Constitutional:       General: She is awake  She is not in acute distress  Appearance: Normal appearance  She is well-developed and well-groomed  She is obese  She is not ill-appearing, toxic-appearing or diaphoretic  HENT:      Head: Normocephalic  Right Ear: Hearing, tympanic membrane, ear canal and external ear normal       Left Ear: Hearing, tympanic membrane, ear canal and external ear normal       Nose: Nose normal       Mouth/Throat:      Lips: Pink  Pharynx: Uvula midline  Eyes:      General: Lids are normal       Conjunctiva/sclera: Conjunctivae normal       Pupils: Pupils are equal, round, and reactive to light  Neck:      Thyroid: No thyroid mass or thyromegaly  Vascular: Normal carotid pulses  No carotid bruit or JVD  Cardiovascular:      Rate and Rhythm: Normal rate and regular rhythm  Pulses: Normal pulses  no weak pulses          Dorsalis pedis pulses are 2+ on the right side and 2+ on the left side  Heart sounds: Normal heart sounds, S1 normal and S2 normal  No murmur heard  Pulmonary:      Effort: Pulmonary effort is normal       Breath sounds: Normal breath sounds  Abdominal:      General: Abdomen is flat  Bowel sounds are normal       Palpations: Abdomen is soft  Tenderness: There is no abdominal tenderness  Musculoskeletal:         General: Normal range of motion  Cervical back: Full passive range of motion without pain  Right lower leg: No edema  Left lower leg: No edema  Feet:      Right foot:      Skin integrity: No ulcer, skin breakdown, erythema, warmth, callus or dry skin  Left foot:      Skin integrity: No ulcer, skin breakdown, erythema, warmth, callus or dry skin  Lymphadenopathy:      Head:      Right side of head: No submental, submandibular, tonsillar, preauricular, posterior auricular or occipital adenopathy  Left side of head: No submental, submandibular, tonsillar, preauricular, posterior auricular or occipital adenopathy  Cervical: No cervical adenopathy  Skin:     General: Skin is warm and dry  Neurological:      Mental Status: She is alert and oriented to person, place, and time  Sensory: Sensory deficit (LLE) present  Motor: No weakness  Deep Tendon Reflexes: Reflexes are normal and symmetric  Psychiatric:         Attention and Perception: Attention normal          Mood and Affect: Mood normal          Speech: Speech normal          Behavior: Behavior normal  Behavior is cooperative  Thought Content: Thought content normal          Cognition and Memory: Cognition normal          Judgment: Judgment normal         Diabetic Foot Exam    Patient's shoes and socks removed  Right Foot/Ankle   Right Foot Inspection  Skin Exam: skin normal and skin intact no dry skin, no warmth, no callus, no erythema, no maceration, no abnormal color, no pre-ulcer, no ulcer and no callus                          Toe Exam: ROM and strength within normal limits  Sensory       Monofilament testing: intact  Vascular  Capillary refills: < 3 seconds  The right DP pulse is 2+  Left Foot/Ankle  Left Foot Inspection  Skin Exam: skin normal and skin intactno dry skin, no warmth, no erythema, no maceration, normal color, no pre-ulcer, no ulcer and no callus                         Toe Exam: ROM and strength within normal limits                   Sensory       Monofilament: absent  Vascular  Capillary refills: < 3 seconds  The left DP pulse is 2+  Assign Risk Category:  No deformity present; Loss of protective sensation;  No weak pulses       Risk: 0    Yane A Vidal Sesay, [address] INTERNAL MEDICINE

## 2021-06-28 NOTE — PATIENT INSTRUCTIONS

## 2021-06-29 PROBLEM — M54.50 LEFT LOW BACK PAIN: Status: ACTIVE | Noted: 2021-06-29

## 2021-06-29 PROBLEM — K90.9 FATTY STOOLS: Status: ACTIVE | Noted: 2021-06-29

## 2021-06-29 PROBLEM — E10.9 TYPE 1 DIABETES MELLITUS WITHOUT COMPLICATION (HCC): Status: RESOLVED | Noted: 2019-01-24 | Resolved: 2021-06-29

## 2021-06-29 PROBLEM — Z00.00 ANNUAL PHYSICAL EXAM: Status: ACTIVE | Noted: 2021-06-29

## 2021-06-29 NOTE — ASSESSMENT & PLAN NOTE
Pt c/o left sided low back pain with associated numbness in the LLE    Pt referred for evaluation with PT

## 2021-06-29 NOTE — ASSESSMENT & PLAN NOTE
LLE numbness continues  She was seen by neuro regarding this and MRI was repeated  No change from previous  She had normal EMG done in 2019  Strength is good bilaterally, able to ambulate without problem  She does seem to have an underlying neuropathy with heat/burning in both feet but the numbness may possilby be related to low back pain/sciatica    Referred for further evaluation with PT

## 2021-06-29 NOTE — ASSESSMENT & PLAN NOTE
Problem present since 2019 and has previously been evaluated with GI  She has previously had normal colonoscopy, negative celiac, negative h  Pylori, no ulcers  She did have gastritis which they treated with protonix for 3 months  Pt was told to f/u with GI if this persisted  She is in need of a new referral, which was provided today

## 2021-06-29 NOTE — ASSESSMENT & PLAN NOTE
Endocrinology at , blood glucose has been running high, >300 requiring up to 45 units of her insulin to get sugars down overnight  Endo aware, she is already scheduled for follow up  Most recent A1C was in April and was 7 9  Eye exam up to date, foot exam done today  Follow instructions per endo      Lab Results   Component Value Date    HGBA1C 6 8 (H) 09/14/2020

## 2021-06-29 NOTE — ASSESSMENT & PLAN NOTE
Pt reports fatty/oily stools with blood intermittently  As discussed above, pt is referred to GI for f/u and reassessment

## 2021-06-29 NOTE — ASSESSMENT & PLAN NOTE
Immunizations are up to date  Pt is up to date with eye exams, dental exams  Continue routine gyn exams  Pt getting some exercise most days of the week, reinforced healthy diet, diabetic diet

## 2021-06-30 ENCOUNTER — TELEMEDICINE (OUTPATIENT)
Dept: BEHAVIORAL/MENTAL HEALTH CLINIC | Facility: CLINIC | Age: 26
End: 2021-06-30
Payer: COMMERCIAL

## 2021-06-30 DIAGNOSIS — F41.1 GENERALIZED ANXIETY DISORDER WITH PANIC ATTACKS: ICD-10-CM

## 2021-06-30 DIAGNOSIS — F42.2 MIXED OBSESSIONAL THOUGHTS AND ACTS: Primary | ICD-10-CM

## 2021-06-30 DIAGNOSIS — F31.63 BIPOLAR DISORDER, CURRENT EPISODE MIXED, SEVERE, WITHOUT PSYCHOTIC FEATURES (HCC): ICD-10-CM

## 2021-06-30 DIAGNOSIS — F43.12 CHRONIC POST-TRAUMATIC STRESS DISORDER (PTSD): ICD-10-CM

## 2021-06-30 DIAGNOSIS — F41.0 GENERALIZED ANXIETY DISORDER WITH PANIC ATTACKS: ICD-10-CM

## 2021-06-30 PROCEDURE — 90834 PSYTX W PT 45 MINUTES: CPT | Performed by: SOCIAL WORKER

## 2021-06-30 NOTE — PSYCH
Virtual Regular Visit      Assessment/Plan:    Problem List Items Addressed This Visit        Other    Chronic post-traumatic stress disorder (PTSD)    Generalized anxiety disorder with panic attacks    OCD (obsessive compulsive disorder) - Primary    Bipolar affective disorder ([facility] [address] )          Goals addressed in session: Goal 1 and Goal 2          Reason for visit is No chief complaint on file  Encounter provider Shanelle Mendoza    Provider located at [address] [place] [id number] [phone]      Recent Visits  Date Type Provider Dept   06/28/21 Office Visit Delores Multani, [address] Internal Med   Showing recent visits within past 7 days and meeting all other requirements  Today's Visits  Date Type Provider Dept   06/30/21 [facility] Therapist [facility]   Showing today's visits and meeting all other requirements  Future Appointments  No visits were found meeting these conditions  Showing future appointments within next 150 days and meeting all other requirements       The patient was identified by name and date of birth  Dilshad Barakat was informed that this is a telemedicine visit and that the visit is being conducted through [address] [place] [address] [facility] and patient was informed that this is a secure, HIPAA-compliant platform  She agrees to proceed     My office door was closed  No one else was in the room  She acknowledged consent and understanding of privacy and security of the video platform  The patient has agreed to participate and understands they can discontinue the visit at any time  Patient is aware this is a billable service  Subjective  Dilshad Barakat is a 27 y o  female    D: Met with Alexander Hicks individually  Feels 'ok'  Health has been stable  Dede Kaminski is scheduled to return to [place] on an open ticket end of August   Mallika Cuba has feelings of Cliffton Clipper and acknowledged she will miss him but feels it may be needed to think about the future  Jenny's sister will most likely be moving in to help/supervise BODØ  Going to the gym daily with Cliffton Clipper to swim  Denied SI    A: BODØ presented with appropriate mood and affect  Making progress with going out and health has been a bit more stable  P: Continue individual therapy     HPI     Past Medical History:   Diagnosis Date    Abdominal pain     Anxiety     Anxiety and depression     COVID-19 12/2020    Depression     Eating disorder     history of anorexia/bulemia [id number]    Fracture of fibula     R Salter I   Ma Venetie disease     Hashimoto's disease 2/21/2020    Head injury     Nasal congestion     PTSD (post-traumatic stress disorder)     Rectal bleed     Seizures (HCC)        Past Surgical History:   Procedure Laterality Date    KNEE SURGERY Right 07/06/2020    NASAL SEPTOPLASTY W/ TURBINOPLASTY      SINUS SURGERY      TURBINOPLASTY N/A 3/22/2021    Procedure: TURBINOPLASTY;  Surgeon: Julia Blue MD;  [facility];  Service: ENT    WISDOM TOOTH EXTRACTION      WRIST SURGERY      left;  Excision of ganglion       Current Outpatient Medications   Medication Sig Dispense Refill    ACETONE, URINE, TEST VI Use as needed to test your urine for ketones      albuterol (PROVENTIL HFA,VENTOLIN HFA) 90 mcg/act inhaler Inhale 1 puff every 6 (six) hours as needed for wheezing or shortness of breath 1 Inhaler 5    Altavera 0 15-30 MG-MCG per tablet TAKE ONE TABLET BY MOUTH EVERY DAY 84 tablet 3    amitriptyline (ELAVIL) 10 mg tablet Take 1 tablet (10 mg total) by mouth daily at bedtime 30 tablet 2    ARIPiprazole (ABILIFY) 10 mg tablet Take 1 tablet (10 mg total) by mouth daily 30 tablet 2    Blood Glucose Monitoring Suppl (ONETOUCH VERIO) w/Device KIT Use as directed  0    Cholecalciferol (Vitamin D3) 1 25 MG (27443 UT) CAPS Take 50,000 Units by mouth once a week       Cholecalciferol (Vitamin D3) 50 MCG (2000 UT) TABS Take 2,000 Units by mouth daily 6 days a week      clonazePAM (KlonoPIN) 0 5 mg tablet Take 1 tablet (0 5 mg total) by mouth 2 (two) times a day 45 tablet 2    desvenlafaxine succinate (PRISTIQ) 50 mg 24 hr tablet Take 1 tablet (50 mg total) by mouth daily 30 tablet 2    divalproex sodium (DEPAKOTE ER) 500 mg 24 hr tablet Take 2 tablets (1,000 mg total) by mouth daily 60 tablet 2    EPINEPHrine (EPIPEN) 0 3 mg/0 3 mL SOAJ Inject 0 3 mL (0 3 mg total) into a muscle once for 1 dose 0 6 mL 0    ergocalciferol (VITAMIN D2) 50,000 units 50,000 Units once a week      folic acid (FOLVITE) 1 mg tablet Take 1 mg by mouth daily      glucagon (GLUCAGON EMERGENCY) 1 MG injection Inject 1 mg under the skin once as needed for low blood sugar for up to 2 doses 1 kit 1    insulin aspart (NovoLOG) 100 units/mL injection Use 30 units per day via pump Disp 1 vial per month (Patient taking differently: Use 100units per day via pump Disp 2 vial per month) 30 mL 1    insulin degludec (TRESIBA) 100 units/mL injection pen 34 units once daily E10 65      Insulin Infusion Pump KIT 13 mm cannula, 23 inch tubing change site every 3 days      Insulin Pen Needle (BD PEN NEEDLE NASIM U/F) 32G X 4 MM MISC by Does not apply route 4 (four) times a day for 180 days 400 each 3    Insulin Syringe-Needle U-100 31G X 5/16" 0 5 ML MISC Use to draw up your insulin      Lancets MISC Use      levothyroxine 25 mcg tablet Take 1 tablet (25 mcg total) by mouth daily 60 tablet 0    magnesium oxide (MAG-OX) 400 mg Take 1 tablet (400 mg total) by mouth daily 90 tablet 0    Multiple Vitamin (DAILY VALUE MULTIVITAMIN) TABS Take by mouth daily       ONETOUCH DELICA LANCETS 96P MISC Patient test 6 times daily 600 each 1    ONETOUCH VERIO test strip Test six times a day 600 each 3     No current facility-administered medications for this visit          Allergies   Allergen Reactions    Iodine - Food Allergy Throat Swelling    Insulin Glargine Other (See Comments)     Burning and redness under skin       Review of Systems    Video Exam      I spent 50 minutes directly with the patient during this visit      VIRTUAL VISIT DISCLAIMER    Jacquelyn Musa acknowledges that she has consented to an online visit or consultation  She understands that the online visit is based solely on information provided by her, and that, in the absence of a face-to-face physical evaluation by the physician, the diagnosis she receives is both limited and provisional in terms of accuracy and completeness  This is not intended to replace a full medical face-to-face evaluation by the physician  Jacquelyn Musa understands and accepts these terms

## 2021-07-06 DIAGNOSIS — N94.819 VULVODYNIA: ICD-10-CM

## 2021-07-06 RX ORDER — AMITRIPTYLINE HYDROCHLORIDE 10 MG/1
TABLET, FILM COATED ORAL
Qty: 15 TABLET | Refills: 2 | Status: SHIPPED | OUTPATIENT
Start: 2021-07-06 | End: 2021-08-04

## 2021-07-07 ENCOUNTER — TELEMEDICINE (OUTPATIENT)
Dept: BEHAVIORAL/MENTAL HEALTH CLINIC | Facility: CLINIC | Age: 26
End: 2021-07-07
Payer: COMMERCIAL

## 2021-07-07 DIAGNOSIS — F41.0 GENERALIZED ANXIETY DISORDER WITH PANIC ATTACKS: ICD-10-CM

## 2021-07-07 DIAGNOSIS — F43.12 CHRONIC POST-TRAUMATIC STRESS DISORDER (PTSD): ICD-10-CM

## 2021-07-07 DIAGNOSIS — F31.63 BIPOLAR DISORDER, CURRENT EPISODE MIXED, SEVERE, WITHOUT PSYCHOTIC FEATURES (HCC): ICD-10-CM

## 2021-07-07 DIAGNOSIS — F31.0 BIPOLAR DISORDER, CURRENT EPISODE HYPOMANIC (HCC): ICD-10-CM

## 2021-07-07 DIAGNOSIS — R47.89 WORD FINDING DIFFICULTY: ICD-10-CM

## 2021-07-07 DIAGNOSIS — F41.1 GENERALIZED ANXIETY DISORDER WITH PANIC ATTACKS: ICD-10-CM

## 2021-07-07 DIAGNOSIS — F42.2 MIXED OBSESSIONAL THOUGHTS AND ACTS: Primary | ICD-10-CM

## 2021-07-07 PROCEDURE — 90834 PSYTX W PT 45 MINUTES: CPT | Performed by: SOCIAL WORKER

## 2021-07-07 RX ORDER — ARIPIPRAZOLE 10 MG/1
TABLET ORAL
Qty: 30 TABLET | Refills: 2 | Status: SHIPPED | OUTPATIENT
Start: 2021-07-07 | End: 2021-10-14
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
## 2021-07-09 ENCOUNTER — HOSPITAL ENCOUNTER (EMERGENCY)
Facility: HOSPITAL | Age: 26
Discharge: HOME | End: 2021-07-09
Attending: EMERGENCY MEDICINE
Payer: COMMERCIAL

## 2021-07-09 ENCOUNTER — APPOINTMENT (EMERGENCY)
Dept: RADIOLOGY | Facility: HOSPITAL | Age: 26
End: 2021-07-09
Payer: COMMERCIAL

## 2021-07-09 VITALS
BODY MASS INDEX: 30.59 KG/M2 | TEMPERATURE: 98.6 F | HEART RATE: 99 BPM | HEIGHT: 61 IN | SYSTOLIC BLOOD PRESSURE: 126 MMHG | OXYGEN SATURATION: 100 % | WEIGHT: 162.04 LBS | RESPIRATION RATE: 18 BRPM | DIASTOLIC BLOOD PRESSURE: 82 MMHG

## 2021-07-09 DIAGNOSIS — S93.401A RIGHT ANKLE SPRAIN: Primary | ICD-10-CM

## 2021-07-09 PROCEDURE — 99283 EMERGENCY DEPT VISIT LOW MDM: CPT

## 2021-07-09 PROCEDURE — 99282 EMERGENCY DEPT VISIT SF MDM: CPT | Performed by: PHYSICIAN ASSISTANT

## 2021-07-09 PROCEDURE — 73610 X-RAY EXAM OF ANKLE: CPT

## 2021-07-09 RX ORDER — IBUPROFEN 400 MG/1
400 TABLET ORAL EVERY 6 HOURS PRN
Qty: 12 TABLET | Refills: 0 | Status: SHIPPED | OUTPATIENT
Start: 2021-07-09 | End: 2022-02-22

## 2021-07-09 RX ORDER — SENNOSIDES 8.6 MG
650 CAPSULE ORAL EVERY 8 HOURS PRN
Qty: 9 TABLET | Refills: 0 | Status: SHIPPED | OUTPATIENT
Start: 2021-07-09 | End: 2021-07-12

## 2021-07-09 RX ORDER — ACETAMINOPHEN 325 MG/1
650 TABLET ORAL ONCE
Status: COMPLETED | OUTPATIENT
Start: 2021-07-09 | End: 2021-07-09

## 2021-07-09 RX ADMIN — ACETAMINOPHEN 650 MG: 325 TABLET, FILM COATED ORAL at 04:15

## 2021-07-09 NOTE — Clinical Note
Jory Turcios was seen and treated in our emergency department on 7/9/2021  Diagnosis:     Deshawn Wright    She may return on this date: 07/12/2021         If you have any questions or concerns, please don't hesitate to call        Loraine Williamson PA-C    ______________________________           _______________          _______________  Hospital Representative                              Date                                Time

## 2021-07-09 NOTE — ED PROVIDER NOTES
History  Chief Complaint   Patient presents with    Ankle Injury     pt fell at about 0100 today she missed the bottom step and fell from standing  She is c/o R sided ankle pain and is having trouble bearing weight  No head strike, no LOC  Patient is a 68-year-old female with history of diabetes mellitus the presents emergency department with abrupt onset sharp persistent nonradiating right ankle pain for 3 hours  Patient denies associated symptomatology  Patient states that while she was walking down the stairs in her house, she had missed the bottom step ", and twisted her right ankle causing immediate pain  Patient denies history of right ankle injury  Patient patient denies palliative factors with provocative factors of pressure to right ankle and weight-bearing on right lower extremity  Patient denies not effective treatment  Patient denies fevers, chills, nausea, vomiting, diarrhea, constipation, and urinary symptoms  Patient denies recent fall recent trauma  Patient sick contacts recent travel  Patient denies head strike and [address]  Patient denies chest pain, shortness breath, and abdominal pain  History provided by:  Patient   used: No        Prior to Admission Medications   Prescriptions Last Dose Informant Patient Reported? Taking?    ACETONE, URINE, TEST VI   Yes No   Sig: Use as needed to test your urine for ketones   ARIPiprazole (ABILIFY) 10 mg tablet   No No   Sig: TAKE 1 TABLET BY MOUTH EVERY DAY   Altavera 0 15-30 MG-MCG per tablet  Self No No   Sig: TAKE ONE TABLET BY MOUTH EVERY DAY   Blood Glucose Monitoring Suppl (ONETOUCH VERIO) w/Device KIT  Self Yes No   Sig: Use as directed   Cholecalciferol (Vitamin D3) 1 25 MG (75901 UT) CAPS  Self Yes No   Sig: Take 50,000 Units by mouth once a week    Cholecalciferol (Vitamin D3) 50 MCG (2000 UT) TABS  Self Yes No   Sig: Take 2,000 Units by mouth daily 6 days a week   EPINEPHrine (EPIPEN) 0 3 mg/0 3 mL SOAJ   No No Sig: Inject 0 3 mL (0 3 mg total) into a muscle once for 1 dose   Insulin Infusion Pump KIT  Self Yes No   Si mm cannula, 23 inch tubing change site every 3 days   Insulin Pen Needle (BD PEN NEEDLE NASIM U/F) 32G X 4 MM MISC  Self No No   Sig: by Does not apply route 4 (four) times a day for 180 days   Insulin Syringe-Needle U-100 31G X 5/16" 0 5 ML MISC   Yes No   Sig: Use to draw up your insulin   Lancets MISC   Yes No   Sig: Use   Multiple Vitamin (DAILY VALUE MULTIVITAMIN) TABS  Self Yes No   Sig: Take by mouth daily    ONETOUCH DELICA LANCETS 90K MISC  Self No No   Sig: Patient test 6 times daily   ONETOUCH VERIO test strip  Self No No   Sig: Test six times a day   albuterol (PROVENTIL HFA,VENTOLIN HFA) 90 mcg/act inhaler  Self No No   Sig: Inhale 1 puff every 6 (six) hours as needed for wheezing or shortness of breath   amitriptyline (ELAVIL) 10 mg tablet   No No   Sig: TAKE 1/2 TABLET AT BEDTIME   clonazePAM (KlonoPIN) 0 5 mg tablet  Self No No   Sig: Take 1 tablet (0 5 mg total) by mouth 2 (two) times a day   desvenlafaxine succinate (PRISTIQ) 50 mg 24 hr tablet  Self No No   Sig: Take 1 tablet (50 mg total) by mouth daily   divalproex sodium (DEPAKOTE ER) 500 mg 24 hr tablet   No No   Sig: Take 2 tablets (1,000 mg total) by mouth daily   ergocalciferol (VITAMIN D2) 50,000 units   Yes No   Si,000 Units once a week   folic acid (FOLVITE) 1 mg tablet   Yes No   Sig: Take 1 mg by mouth daily   glucagon (GLUCAGON EMERGENCY) 1 MG injection  Self No No   Sig: Inject 1 mg under the skin once as needed for low blood sugar for up to 2 doses   insulin aspart (NovoLOG) 100 units/mL injection  Self No No   Sig: Use 30 units per day via pump Disp 1 vial per month   Patient taking differently: Use 100units per day via pump Disp 2 vial per month   insulin degludec (TRESIBA) 100 units/mL injection pen   Yes No   Si units once daily E10 65   levothyroxine 25 mcg tablet   No No   Sig: Take 1 tablet (25 mcg total) by mouth daily   magnesium oxide (MAG-OX) 400 mg   No No   Sig: Take 1 tablet (400 mg total) by mouth daily      Facility-Administered Medications: None       Past Medical History:   Diagnosis Date    Abdominal pain     Anxiety     Anxiety and depression     COVID-19 12/2020    Depression     Eating disorder     history of anorexia/bulemia [id number]    Fracture of fibula     R Salter I   Soham Stake disease     Hashimoto's disease 2/21/2020    Head injury     Nasal congestion     PTSD (post-traumatic stress disorder)     Rectal bleed     Seizures ([address] )        Past Surgical History:   Procedure Laterality Date    KNEE SURGERY Right 07/06/2020    NASAL SEPTOPLASTY W/ TURBINOPLASTY      SINUS SURGERY      TURBINOPLASTY N/A 3/22/2021    Procedure: Ana West;  Surgeon: Donald Figueroa MD;  Location: [facility];  Service: ENT    WISDOM TOOTH EXTRACTION      WRIST SURGERY      left; Excision of ganglion       Family History   Problem Relation Age of Onset    Hypertension Mother    Marion Ravel Migraines Mother         Headache    Diabetes type II Mother     Varicose Veins Mother     Hyperlipidemia Mother     Diabetes Mother    Leticia Ravel Arthritis Mother     Depression Mother     Cholelithiasis Father     Hypertension Father     Sarcoidosis Father         Liver    Hyperlipidemia Father     Diabetes Father     Coronary artery disease Father     Nephrolithiasis Father     Cirrhosis Father     Alcohol abuse Father     Thyroid disease Sister     Cancer Family     Diabetes Family     Hypertension Family     Alcohol abuse Brother      I have reviewed and agree with the history as documented      E-Cigarette/Vaping    E-Cigarette Use Never User      E-Cigarette/Vaping Substances    Nicotine No     THC No     CBD No     Flavoring No     Other No     Unknown No      Social History     Tobacco Use    Smoking status: Never Smoker    Smokeless tobacco: Never Used    Tobacco comment: Tobacco smoke exposure (Father smokes cigars)   Vaping Use    Vaping Use: Never used   Substance Use Topics    Alcohol use: Never    Drug use: Never       Review of Systems   Constitutional: Negative for activity change, appetite change, chills and fever  HENT: Negative for congestion, postnasal drip, rhinorrhea, sinus pressure, sinus pain, sore throat and tinnitus  Eyes: Negative for photophobia and visual disturbance  Respiratory: Negative for cough, chest tightness and shortness of breath  Cardiovascular: Negative for chest pain and palpitations  Gastrointestinal: Negative for abdominal pain, constipation, diarrhea, nausea and vomiting  Genitourinary: Negative for difficulty urinating, dysuria, flank pain, frequency and urgency  Musculoskeletal: Positive for arthralgias  Negative for back pain, gait problem, neck pain and neck stiffness  Skin: Negative for pallor and rash  Allergic/Immunologic: Negative for environmental allergies and food allergies  Neurological: Negative for dizziness, weakness, numbness and headaches  Psychiatric/Behavioral: Negative for confusion  All other systems reviewed and are negative  Physical Exam  Physical Exam  Vitals and nursing note reviewed  Constitutional:       General: She is awake  Appearance: Normal appearance  She is well-developed  She is not ill-appearing, toxic-appearing or diaphoretic  Comments: /82 (BP Location: Left arm)   Pulse 99   Temp 98 6 °F (37 °C) (Oral)   Resp 18   Ht 5' 1" (1 549 m)   Wt 73 5 kg (162 lb 0 6 oz)   SpO2 100%   BMI 30 62 kg/m²      HENT:      Head: Normocephalic and atraumatic  Right Ear: Hearing and external ear normal  No decreased hearing noted  No drainage, swelling or tenderness  No mastoid tenderness  Left Ear: Hearing and external ear normal  No decreased hearing noted  No drainage, swelling or tenderness  No mastoid tenderness        Nose: Nose normal       Mouth/Throat: Lips: Pink  Mouth: Mucous membranes are moist       Pharynx: Oropharynx is clear  Uvula midline  Eyes:      General: Lids are normal  Vision grossly intact  Right eye: No discharge  Left eye: No discharge  Extraocular Movements: Extraocular movements intact  Conjunctiva/sclera: Conjunctivae normal       Pupils: Pupils are equal, round, and reactive to light  Neck:      Vascular: No JVD  Trachea: Trachea and phonation normal  No tracheal tenderness or tracheal deviation  Cardiovascular:      Rate and Rhythm: Normal rate and regular rhythm  Pulses: Normal pulses  Radial pulses are 2+ on the right side and 2+ on the left side  Dorsalis pedis pulses are 2+ on the right side and 2+ on the left side  Posterior tibial pulses are 2+ on the right side and 2+ on the left side  Heart sounds: Normal heart sounds  Pulmonary:      Effort: Pulmonary effort is normal       Breath sounds: No stridor  No decreased breath sounds, wheezing, rhonchi or rales  Chest:      Chest wall: No tenderness  Abdominal:      General: Abdomen is flat  Palpations: Abdomen is not rigid  Musculoskeletal:         General: Normal range of motion  Cervical back: Full passive range of motion without pain, normal range of motion and neck supple  No rigidity  No spinous process tenderness or muscular tenderness  Normal range of motion  Right ankle: Swelling present  No deformity  Tenderness present over the lateral malleolus and medial malleolus  Right Achilles Tendon: Normal  Jones's test negative  Left ankle:      Left Achilles Tendon: Normal  Jones's test negative        Right foot: Normal       Left foot: Normal       Comments: Passive ROM intact  Upper extremity 4/5 bilaterally  Left lower extremity 4/5  Right lower extremity, hip 4/5, knee, 4/5, ankle 3/5  Neurovascularly intact  No grinding or clicking of joints       Lymphadenopathy: Head:      Right side of head: No submental, submandibular, tonsillar, preauricular, posterior auricular or occipital adenopathy  Left side of head: No submental, submandibular, tonsillar, preauricular, posterior auricular or occipital adenopathy  Cervical: No cervical adenopathy  Right cervical: No superficial, deep or posterior cervical adenopathy  Left cervical: No superficial, deep or posterior cervical adenopathy  Skin:     General: Skin is warm  Capillary Refill: Capillary refill takes less than 2 seconds  Neurological:      General: No focal deficit present  Mental Status: She is alert and oriented to person, place, and time  GCS: GCS eye subscore is 4  GCS verbal subscore is 5  GCS motor subscore is 6  Sensory: No sensory deficit  Deep Tendon Reflexes: Reflexes are normal and symmetric  Reflex Scores:       Patellar reflexes are 2+ on the right side and 2+ on the left side  Psychiatric:         Mood and Affect: Mood normal          Speech: Speech normal          Behavior: Behavior normal  Behavior is cooperative           Vital Signs  ED Triage Vitals   Temperature Pulse Respirations Blood Pressure SpO2   07/09/21 0353 07/09/21 0353 07/09/21 0353 07/09/21 0353 07/09/21 0353   98 6 °F (37 °C) 99 18 126/82 100 %      Temp Source Heart Rate Source Patient Position - Orthostatic VS BP Location FiO2 (%)   07/09/21 0353 07/09/21 0353 07/09/21 0353 07/09/21 0353 --   Oral Monitor Sitting Left arm       Pain Score       07/09/21 0415       3           Vitals:    07/09/21 0353   BP: 126/82   Pulse: 99   Patient Position - Orthostatic VS: Sitting         Visual Acuity      ED Medications  Medications   acetaminophen (TYLENOL) tablet 650 mg (650 mg Oral Given 7/9/21 0415)       Diagnostic Studies  Results Reviewed     None                 XR ankle 3+ vw right   ED Interpretation by Teodora Beltran PA-C (07/09 0528)   No acute osseous abnormality on initial read Procedures  Splint application    Date/Time: 7/9/2021 5:32 AM  Performed by: Zaira Moran PA-C  Authorized by: Zaira Moran PA-C   [facility] Protocol:  Consent: Verbal consent obtained  Risks and benefits: risks, benefits and alternatives were discussed  Consent given by: patient  Time out: Immediately prior to procedure a "time out" was called to verify the correct patient, procedure, equipment, support staff and site/side marked as required  Timeout called at: 7/9/2021 5:32 AM   Patient understanding: patient states understanding of the procedure being performed  Patient identity confirmed: verbally with patient and arm band      Pre-procedure details:     Sensation:  Normal    Skin color:  Pink  Procedure details:     Laterality:  Right    Location:  Ankle    Ankle:  R ankle    Strapping: no  Cast type: aircast   Post-procedure details:     Pain:  Improved    Sensation:  Normal    Skin color:  Pink    Patient tolerance of procedure: Tolerated well, no immediate complications             ED Course  ED Course as of Jul 09 0546   Fri Jul 09, 2021   [id number] Patient denies offered pregnancy test                                SBIRT 22yo+      Most Recent Value   SBIRT (23 yo +)   In order to provide better care to our patients, we are screening all of our patients for alcohol and drug use  Would it be okay to ask you these screening questions?   No Filed at: 07/09/2021 0355                    MDM  Number of Diagnoses or Management Options  Right ankle sprain: new and does not require workup     Amount and/or Complexity of Data Reviewed  Tests in the radiology section of CPT®: ordered and reviewed  Review and summarize past medical records: yes  Independent visualization of images, tracings, or specimens: yes    Risk of Complications, Morbidity, and/or Mortality  Presenting problems: low  Diagnostic procedures: low  Management options: low    Patient Progress  Patient progress: stable    Patient is a 53-year-old female with history of diabetes mellitus the presents emergency department with abrupt onset sharp persistent nonradiating right ankle pain for 3 hours  Patient denies associated symptomatology  Patient states that while she was walking down the stairs in her house, she had missed the bottom step ", and twisted her right ankle causing immediate pain  Patient denies history of right ankle injury  Patient hemodynamically stable and afebrile  Right ankle x-ray with no acute osseous abnormality on initial read  Delivered Tylenol and ice in the ED with patient verbalized decrease in right ankle pain symptoms status post medication delivery  Patient had brought her own crutches into the ED and she mechanically demonstrated crutch use with my direct supervision  Applied Aircast patient verbalized decrease in right ankle pain status post Aircast delivery  Prescribed Tylenol and Motrin and counseled patient medication administration and side effects  Follow-up with PCP  Follow up emergency department symptoms persist or exacerbate  Patient demonstrates verbal understanding all clinical imaging findings, discharge instructions, follow-up verbalized agreement with current treatment plan  Disposition  Final diagnoses:   Right ankle sprain     Time reflects when diagnosis was documented in both MDM as applicable and the Disposition within this note     Time User Action Codes Description Comment    7/9/2021  5:29 AM Luisa Velazquez Add [[id number]] Right ankle sprain       ED Disposition     ED Disposition Condition Date/Time Comment    Discharge Stable Fri Jul 9, 2021  5:29 AM Luis Alfredo Sanchez discharge to home/self care              Follow-up Information     Follow up With Specialties Details Why Contact Info [facility], Nurse Practitioner   [address] [facility] [address] [place] [address]  [address] [facility], Emergency Department Emergency [address] [address] [facility], [address], [place], [place], [address]          Discharge Medication List as of 7/9/2021  5:31 AM      START taking these medications    Details   acetaminophen (TYLENOL) 650 mg CR tablet Take 1 tablet (650 mg total) by mouth every 8 (eight) hours as needed for mild pain for up to 3 days, Starting Fri 7/9/2021, Until Mon 7/12/2021 at 2359, Normal      ibuprofen (MOTRIN) 400 mg tablet Take 1 tablet (400 mg total) by mouth every 6 (six) hours as needed for mild pain for up to 3 days, Starting Fri 7/9/2021, Until Mon 7/12/2021 at 2359, Normal         CONTINUE these medications which have NOT CHANGED    Details   ACETONE, URINE, TEST VI Use as needed to test your urine for ketones, Historical Med      albuterol (PROVENTIL HFA,VENTOLIN HFA) 90 mcg/act inhaler Inhale 1 puff every 6 (six) hours as needed for wheezing or shortness of breath, Starting Tue 4/21/2020, Normal      Altavera 0 15-30 MG-MCG per tablet TAKE ONE TABLET BY MOUTH EVERY DAY, Normal      amitriptyline (ELAVIL) 10 mg tablet TAKE 1/2 TABLET AT BEDTIME, Normal      ARIPiprazole (ABILIFY) 10 mg tablet TAKE 1 TABLET BY MOUTH EVERY DAY, Normal      Blood Glucose Monitoring Suppl (Robert Cooley) w/Device KIT Use as directed, Starting Sat 1/26/2019, Historical Med      Cholecalciferol (Vitamin D3) 1 25 MG (89382 UT) CAPS Take 50,000 Units by mouth once a week , Historical Med      Cholecalciferol (Vitamin D3) 50 MCG (2000 UT) TABS Take 2,000 Units by mouth daily 6 days a week, Historical Med      clonazePAM (KlonoPIN) 0 5 mg tablet Take 1 tablet (0 5 mg total) by mouth 2 (two) times a day, Starting Tue 4/13/2021, Normal      desvenlafaxine succinate (PRISTIQ) 50 mg 24 hr tablet Take 1 tablet (50 mg total) by mouth daily, Starting Tue 4/13/2021, Normal      divalproex sodium (DEPAKOTE ER) 500 mg 24 hr tablet Take 2 tablets (1,000 mg total) by mouth daily, Starting Fri 4/30/2021, Normal      EPINEPHrine (EPIPEN) 0 3 mg/0 3 mL SOAJ Inject 0 3 mL (0 3 mg total) into a muscle once for 1 dose, Starting Wed 5/19/2021, Normal      ergocalciferol (VITAMIN D2) 50,000 units 50,000 Units once a week, Starting Thu 5/6/2021, Historical Med      folic acid (FOLVITE) 1 mg tablet Take 1 mg by mouth daily, Starting Fri 5/14/2021, Until Sat 5/14/2022, Historical Med      glucagon (GLUCAGON EMERGENCY) 1 MG injection Inject 1 mg under the skin once as needed for low blood sugar for up to 2 doses, Starting Wed 2/6/2019, Normal      insulin aspart (NovoLOG) 100 units/mL injection Use 30 units per day via pump Disp 1 vial per month, Normal      insulin degludec (TRESIBA) 100 units/mL injection pen 34 units once daily E10 65, Historical Med      Insulin Infusion Pump KIT 13 mm cannula, 23 inch tubing change site every 3 days, Historical Med      Insulin Pen Needle (BD PEN NEEDLE NASIM U/F) 32G X 4 MM MISC by Does not apply route 4 (four) times a day for 180 days, Starting Wed 6/5/2019, Until Thu 4/29/2021, Normal      Insulin Syringe-Needle U-100 31G X 5/16" 0 5 ML MISC Use to draw up your insulin, Historical Med      !! Lancets MISC Use, Starting Fri 5/14/2021, Historical Med      levothyroxine 25 mcg tablet Take 1 tablet (25 mcg total) by mouth daily, Starting Wed 5/19/2021, Normal      magnesium oxide (MAG-OX) 400 mg Take 1 tablet (400 mg total) by mouth daily, Starting Thu 4/29/2021, Normal      Multiple Vitamin (DAILY VALUE MULTIVITAMIN) TABS Take by mouth daily , Historical Med      !! ONETOUCH DELICA LANCETS 74B MISC Patient test 6 times daily, Normal      ONETOUCH VERIO test strip Test six times a day, Normal       !! - Potential duplicate medications found  Please discuss with provider  No discharge procedures on file      PDMP Review       Value Time User    PDMP Reviewed  Yes 7/9/2021  3:50 AM Pradeep Mclaughlin MD          ED Provider  Electronically Signed by           Sangeeta Kitchen PA-C  07/09/21 [id number]

## 2021-07-09 NOTE — DISCHARGE INSTRUCTIONS
Take Tylenol as indicated  Only take Motrin if not pregnant, and take as indicated  Follow-up with PCP  Follow up emergency department symptoms persist or exacerbate

## 2021-07-12 ENCOUNTER — EVALUATION (OUTPATIENT)
Dept: PHYSICAL THERAPY | Facility: CLINIC | Age: 26
End: 2021-07-12
Payer: COMMERCIAL

## 2021-07-12 ENCOUNTER — TELEPHONE (OUTPATIENT)
Dept: OBGYN CLINIC | Facility: HOSPITAL | Age: 26
End: 2021-07-12

## 2021-07-12 DIAGNOSIS — M54.50 LEFT LOW BACK PAIN, UNSPECIFIED CHRONICITY, UNSPECIFIED WHETHER SCIATICA PRESENT: ICD-10-CM

## 2021-07-12 DIAGNOSIS — R20.2 PARESTHESIA OF LEFT LEG: Primary | ICD-10-CM

## 2021-07-12 PROCEDURE — 97162 PT EVAL MOD COMPLEX 30 MIN: CPT | Performed by: PHYSICAL THERAPIST
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
## 2021-07-12 NOTE — PROGRESS NOTES
Assessment and Plan:   Ms Karey Mullins is a 81-year-old female with history significant for type 1 insulin-dependent diabetes mellitus diagnosed in 2019 and Hashimoto's thyroiditis diagnosed in 2020, who presents for follow-up of a positive SOHAM 1:80 nuclear, speckled, homogeneous patterns and rheumatoid factor of 10, in addition to widespread joint pains        - Chaz Brasher presents today for follow-up of widespread arthralgias and myalgias she has been experiencing over the past 1 and half years which nearly coincides with her diagnosis of the additional autoimmune diseases, specifically type 1 diabetes mellitus and Hashimoto's thyroiditis  She does not describe prominent inflammatory symptoms such as significant joint swelling or prolonged morning stiffness and there was no synovitis noted on her examination previously  To further evaluate the positive SOHAM/RF as well as her arthralgias, we completed a thorough autoimmune workup which was unrevealing  I suspect the arthralgias and myalgias may be stemming from a fibromyalgia like component, but given her predilection for autoimmune diseases, the borderline positive serologies as well as her ongoing symptoms which have significantly been interfering with her quality of life, I discussed the possibility of an undifferentiated connective tissue disorder with her  - I had a discussion with her that to address the possibility of an undifferentiated connective tissue disorder it may be reasonable to consider a six-month trial with hydroxychloroquine 200 mg twice daily to see if this improves her symptoms  I reviewed the side effects with her including but not limited to the need for annual eye exam if this medication is continued long-term  If there is no improvement in her symptoms noted by the follow-up visit we will discontinue the hydroxychloroquine and in that case likely continue to manage her for a fibromyalgia like presentation    It is also possible she may be having an overlap of symptoms with the underlying Hashimoto's thyroiditis and type 1 insulin-dependent diabetes  - For the arthralgias I advised her that she can continue with the NSAIDs as needed as well as aqua therapy        Plan:  Diagnoses and all orders for this visit:    Undifferentiated connective tissue disease ([address] )  -     hydroxychloroquine (PLAQUENIL) 200 mg tablet; Take 1 tablet (200 mg total) by mouth 2 (two) times a day with meals    Long-term use of Plaquenil    Elevated rheumatoid factor    Hashimoto's disease    Type 1 diabetes mellitus without complication (HCC)    Other orders  -     magnesium oxide (MAG-OX) 400 mg tablet; Take 1 tablet by mouth daily      Activities as tolerated  Exercise: try to maintain a low impact exercise regimen as much as possible  Walk for 30 minutes a day for at least 3 days a week  Continue other medications as prescribed by PCP and other specialists  RTC in 7 months  HPI    INITIAL VISIT NOTE (6/2021):  Ms Mandi Levy is a 80-year-old female with history significant for type 1 insulin-dependent diabetes mellitus diagnosed in 2019 and Hashimoto's thyroiditis (elevated thyroid microsomal and thyroglobulin antibodies) diagnosed in 2020, who presents for further evaluation of a positive SOHAM 1:80 nuclear, speckled, homogeneous patterns and rheumatoid factor of 10, in addition to widespread joint pains  She is referred by MARGARET Quinn for a rheumatology consult        Patient reports she started to feel unwell approximately 2 years ago and further evaluation for this led to the diagnosis of type 1 diabetes as well as the Hashimoto's thyroiditis  She has been managing the diabetes with insulin and states for the hypothyroidism as a result of Hashimoto's thyroiditis she was only started on levothyroxine 25 mcg once daily approximately 1 month ago    There has been a conflict between the endocrinologists she has seen in regards to starting the levothyroxine, but due to progressive complaints which the patient and her primary care physician felt was related to hypothyroidism she was finally started on thyroid supplementation 1 month ago  She has not noticed a change in her symptoms so far and has not had a TSH rechecked yet  The TSH was slightly elevated at 5 24 on 5/1 prior to starting the levothyroxine  She is also scheduled to see a private endocrinologist for another opinion      She reports over the past 1 and half years she has also started to experience joint and muscle pain which has been gradually progressive  She experiences a degree of pain on a daily basis but states that her symptoms can spontaneously flare-up as well as with changes in the weather, especially when it is cold/damp/humid  She does feel better with the warmer weather and has also started utilizing a therapy pool which does help with her symptoms  She has noticed joint pains to affect her hands occasionally but prominently in her wrists  She will notice pain in her shoulders and hips mostly with manipulation and range of motion  She describes chronic pain that will also affect her knees as well as in her ankles and feet  At times she will notice a muscle pain throughout her upper and lower extremities as well  She has noticed muscle cramps to affect her hands and feet  No significant joint pains in her elbows or low back  She has not noticed any obvious swelling of her wrists but feels like they may be swollen as she can gauge this by her bracelet  She has also noticed swelling to affect her ankles  She does experience morning stiffness which affects her diffusely and takes about 30-40 minutes to improve  She tries to avoid Tylenol as this affects her blood glucose monitoring  She has tried ibuprofen for her symptoms which has not helped significantly      Other than the body pain she also describes chronic fatigue, unintentional weight gain and hair thinning  She denies fevers, unintentional weight loss, focal alopecia, dry eyes, dry mouth, inflammatory eye disease, skin rash, psoriasis, photosensitivity, mouth/nose ulcers, swollen glands, pleuritic chest pain, shortness of breath, inflammatory bowel disease, blood clots (reports a history of 1 very early miscarriage), Raynaud's or a family history of autoimmune disease      Testing done for her symptoms showed the positive SOHAM and borderline positive rheumatoid factor  A rheumatoid factor was negative in 2019  An ESR, CRP, anti CCP antibody, CK, Lyme antibody profile, anti neutrophilic cytoplasmic antibodies, Sjogren's antibodies and anti double-stranded DNA antibody were normal   An MRI of her right knee and ultrasound of her right Achilles tendon in 2018 were normal      In view of her complaints she was also evaluated by Neurology to rule out multiple sclerosis and currently there is no specific diagnosis from their perspective  She did have an MRI of her brain done last year which showed a single focus of white matter change which has been stable since 2014 7/13/2021:  Patient presents for a follow-up of a positive SOHAM  I reviewed her workup done following the last office visit which showed an unremarkable SOHAM specificity, C3, C4, antiphospholipid antibody testing, urinalysis, urine protein creatinine ratio, vitamin-D, CK, anti CCP antibody and HLA B27 antigen  X-rays of her hands and feet were unremarkable  She reports there has been no change in her symptoms since the last office visit and she continues to describe the chronic fatigue, muscle aches/spasms as well as ongoing joint pains  She is scheduled for another endocrinology opinion tomorrow to see if any of her symptoms may be related to the underlying hypothyroidism, but this is not felt to be the case so far      The following portions of the patient's history were reviewed and updated as appropriate: allergies, current medications, past family history, past medical history, past social history, past surgical history and problem list       Review of Systems  Constitutional: Negative for weight change, fevers, chills, night sweats  Positive for fatigue  ENT/Mouth: Negative for hearing changes, ear pain, nasal congestion, sinus pain, hoarseness, sore throat, rhinorrhea, swallowing difficulty  Eyes: Negative for pain, redness, discharge, vision changes  Cardiovascular: Negative for chest pain, SOB, palpitations  Respiratory: Negative for cough, sputum, wheezing, dyspnea  Gastrointestinal: Negative for nausea, vomiting, diarrhea, constipation, pain, heartburn  Genitourinary: Negative for dysuria, urinary frequency, hematuria  Musculoskeletal: As per HPI  Skin: Negative for skin rash, color changes  Neuro: Negative for weakness, numbness, tingling, loss of consciousness  Psych: Negative for anxiety, depression  Heme/Lymph: Negative for easy bruising, bleeding, lymphadenopathy  Past Medical History:   Diagnosis Date    Abdominal pain     Anxiety     Anxiety and depression     COVID-19 12/2020    Depression     Eating disorder     history of anorexia/bulemia [id number]    Fracture of fibula     R Salter I   Pattie Parsons disease     Hashimoto's disease 2/21/2020    Head injury     Nasal congestion     PTSD (post-traumatic stress disorder)     Rectal bleed     Seizures (HCC)        Past Surgical History:   Procedure Laterality Date    KNEE SURGERY Right 07/06/2020    NASAL SEPTOPLASTY W/ TURBINOPLASTY      SINUS SURGERY      TURBINOPLASTY N/A 3/22/2021    Procedure: Calos Hernandez;  Surgeon: Anamaria Yost MD;  Location: [facility];  Service: ENT    WISDOM TOOTH EXTRACTION      WRIST SURGERY      left;  Excision of ganglion       Social History     Socioeconomic History    Marital status: Single     Spouse name: Not on file    Number of children: 0    Years of education: Not on file    Highest education level: Associate degree: academic program   Occupational History    Occupation: unemployed   Tobacco Use    Smoking status: Never Smoker    Smokeless tobacco: Never Used    Tobacco comment: Tobacco smoke exposure (Father smokes cigars)   Vaping Use    Vaping Use: Never used   Substance and Sexual Activity    Alcohol use: Never    Drug use: Never    Sexual activity: Not Currently     Partners: Male     Birth control/protection: Condom Male, OCP   Other Topics Concern    Not on file   Social History Narrative    Student at [employer] a poor diet; low in vegetables, high in sweets    Dental care, regularly    Lives with parents    Sleeps 8-10 hours a day     Social Determinants of Health     Financial Resource Strain: Medium Risk    Difficulty of Paying Living Expenses: Somewhat hard   Food Insecurity:     Worried About Running Out of Food in the Last Year:     Ran Out of Food in the Last Year:    Transportation Needs:     Lack of Transportation (Medical):      Lack of Transportation (Non-Medical):    Physical Activity: Insufficiently Active    Days of Exercise per Week: 7 days    Minutes of Exercise per Session: 10 min   Stress: Stress Concern Present    Feeling of Stress : Rather much   Social Connections: Unknown    Frequency of Communication with Friends and Family: More than three times a week    Frequency of Social Gatherings with Friends and Family: Not on file    Attends Quaker Services: Not on file    Active Member of Clubs or Organizations: No    Attends Club or Organization Meetings: Never    Marital Status: Never    Intimate Partner Violence: Not At Risk    Fear of Current or Ex-Partner: No    Emotionally Abused: No    Physically Abused: No    Sexually Abused: No       Family History   Problem Relation Age of Onset    Hypertension Mother     Migraines Mother         Headache    Diabetes type II Mother     Varicose Veins Mother     Hyperlipidemia Mother     Diabetes Mother     Arthritis Mother     Depression Mother     Cholelithiasis Father     Hypertension Father     Sarcoidosis Father         Liver    Hyperlipidemia Father     Diabetes Father     Coronary artery disease Father     Nephrolithiasis Father     Cirrhosis Father     Alcohol abuse Father     Thyroid disease Sister     Cancer Family     Diabetes Family     Hypertension Family     Alcohol abuse Brother        Allergies   Allergen Reactions    Iodine - Food Allergy Throat Swelling    Insulin Glargine Other (See Comments)     Burning and redness under skin       Current Outpatient Medications:     ACETONE, URINE, TEST VI, Use as needed to test your urine for ketones, Disp: , Rfl:     albuterol (PROVENTIL HFA,VENTOLIN HFA) 90 mcg/act inhaler, Inhale 1 puff every 6 (six) hours as needed for wheezing or shortness of breath, Disp: 1 Inhaler, Rfl: 5    Altavera 0 15-30 MG-MCG per tablet, TAKE ONE TABLET BY MOUTH EVERY DAY, Disp: 84 tablet, Rfl: 3    amitriptyline (ELAVIL) 10 mg tablet, TAKE 1/2 TABLET AT BEDTIME, Disp: 15 tablet, Rfl: 2    ARIPiprazole (ABILIFY) 10 mg tablet, TAKE 1 TABLET BY MOUTH EVERY DAY, Disp: 30 tablet, Rfl: 2    Blood Glucose Monitoring Suppl (ONETOUCH VERIO) w/Device KIT, Use as directed, Disp: , Rfl: 0    Cholecalciferol (Vitamin D3) 1 25 MG (11133 UT) CAPS, Take 50,000 Units by mouth once a week , Disp: , Rfl:     Cholecalciferol (Vitamin D3) 50 MCG (2000 UT) TABS, Take 2,000 Units by mouth daily 6 days a week, Disp: , Rfl:     clonazePAM (KlonoPIN) 0 5 mg tablet, Take 1 tablet (0 5 mg total) by mouth 2 (two) times a day, Disp: 45 tablet, Rfl: 2    desvenlafaxine succinate (PRISTIQ) 50 mg 24 hr tablet, Take 1 tablet (50 mg total) by mouth daily, Disp: 30 tablet, Rfl: 2    divalproex sodium (DEPAKOTE ER) 500 mg 24 hr tablet, Take 2 tablets (1,000 mg total) by mouth daily, Disp: 60 tablet, Rfl: 2    EPINEPHrine (EPIPEN) 0 3 mg/0 3 mL SOAJ, Inject 0 3 mL (0 3 mg total) into a muscle once for 1 dose, Disp: 0 6 mL, Rfl: 0    ergocalciferol (VITAMIN D2) 50,000 units, 50,000 Units once a week, Disp: , Rfl:     folic acid (FOLVITE) 1 mg tablet, Take 1 mg by mouth daily, Disp: , Rfl:     glucagon (GLUCAGON EMERGENCY) 1 MG injection, Inject 1 mg under the skin once as needed for low blood sugar for up to 2 doses, Disp: 1 kit, Rfl: 1    hydroxychloroquine (PLAQUENIL) 200 mg tablet, Take 1 tablet (200 mg total) by mouth 2 (two) times a day with meals, Disp: 60 tablet, Rfl: 5    ibuprofen (MOTRIN) 400 mg tablet, Take 1 tablet (400 mg total) by mouth every 6 (six) hours as needed for mild pain for up to 3 days, Disp: 12 tablet, Rfl: 0    insulin aspart (NovoLOG) 100 units/mL injection, Use 30 units per day via pump Disp 1 vial per month (Patient taking differently: Use 100units per day via pump Disp 2 vial per month), Disp: 30 mL, Rfl: 1    insulin degludec (TRESIBA) 100 units/mL injection pen, 34 units once daily E10 65, Disp: , Rfl:     Insulin Infusion Pump KIT, 13 mm cannula, 23 inch tubing change site every 3 days, Disp: , Rfl:     Insulin Pen Needle (BD PEN NEEDLE NASIM U/F) 32G X 4 MM MISC, by Does not apply route 4 (four) times a day for 180 days, Disp: 400 each, Rfl: 3    Insulin Syringe-Needle U-100 31G X 5/16" 0 5 ML MISC, Use to draw up your insulin, Disp: , Rfl:     Lancets MISC, Use, Disp: , Rfl:     levothyroxine 25 mcg tablet, Take 1 tablet (25 mcg total) by mouth daily, Disp: 60 tablet, Rfl: 0    magnesium oxide (MAG-OX) 400 mg tablet, Take 1 tablet by mouth daily, Disp: , Rfl:     magnesium oxide (MAG-OX) 400 mg, Take 1 tablet (400 mg total) by mouth daily, Disp: 90 tablet, Rfl: 0    Multiple Vitamin (DAILY VALUE MULTIVITAMIN) TABS, Take by mouth daily , Disp: , Rfl:     ONETOUCH DELICA LANCETS 25A MISC, Patient test 6 times daily, Disp: 600 each, Rfl: 1    ONETOUCH VERIO test strip, Test six times a day, Disp: 600 each, Rfl: 3      Objective:    Vitals: 07/13/21 0845   BP: 122/84   Pulse: 92       Physical Exam  General: Well appearing, well nourished, in no distress  Oriented x 3, normal mood and affect  Ambulating with aid of crutches  Skin: Good turgor, no rash, unusual bruising or prominent lesions  Hair: Normal texture and distribution  Nails: Normal color, no deformities  HEENT:  Head: Normocephalic, atraumatic  Eyes: Conjunctiva clear, sclera non-icteric, EOM intact  Extremities: No amputations or deformities, cyanosis, edema  Neurologic: Alert and oriented  No focal neurological deficits appreciated  Psychiatric: Normal mood and affect  DEVONTE Huffman    Rheumatology

## 2021-07-12 NOTE — TELEPHONE ENCOUNTER
Patient sees Dr Nirali Geiger    Patient called back and rescheduled with Dr Nirali Geiger for February 1st  Patient would like a call back with a sooner appointment if possible      Call back # [phone]

## 2021-07-13 ENCOUNTER — OFFICE VISIT (OUTPATIENT)
Dept: RHEUMATOLOGY | Facility: CLINIC | Age: 26
End: 2021-07-13
Payer: COMMERCIAL

## 2021-07-13 VITALS — SYSTOLIC BLOOD PRESSURE: 122 MMHG | HEART RATE: 92 BPM | DIASTOLIC BLOOD PRESSURE: 84 MMHG

## 2021-07-13 DIAGNOSIS — E10.9 TYPE 1 DIABETES MELLITUS WITHOUT COMPLICATION (HCC): ICD-10-CM

## 2021-07-13 DIAGNOSIS — Z79.899 LONG-TERM USE OF PLAQUENIL: ICD-10-CM

## 2021-07-13 DIAGNOSIS — R76.8 ELEVATED RHEUMATOID FACTOR: ICD-10-CM

## 2021-07-13 DIAGNOSIS — E06.3 HASHIMOTO'S DISEASE: ICD-10-CM

## 2021-07-13 DIAGNOSIS — M35.9 UNDIFFERENTIATED CONNECTIVE TISSUE DISEASE (HCC): Primary | ICD-10-CM

## 2021-07-13 PROCEDURE — 99214 OFFICE O/P EST MOD 30 MIN: CPT | Performed by: INTERNAL MEDICINE

## 2021-07-13 PROCEDURE — 1036F TOBACCO NON-USER: CPT | Performed by: INTERNAL MEDICINE

## 2021-07-13 RX ORDER — HYDROXYCHLOROQUINE SULFATE 200 MG/1
200 TABLET, FILM COATED ORAL 2 TIMES DAILY WITH MEALS
Qty: 60 TABLET | Refills: 5 | Status: SHIPPED | OUTPATIENT
Start: 2021-07-13 | End: 2022-02-04 | Stop reason: SDUPTHER

## 2021-07-13 RX ORDER — MAGNESIUM OXIDE 400 MG/1
1 TABLET ORAL DAILY
COMMUNITY
Start: 2021-06-28 | End: 2021-11-02

## 2021-07-14 ENCOUNTER — TELEPHONE (OUTPATIENT)
Dept: PSYCHIATRY | Facility: CLINIC | Age: 26
End: 2021-07-14

## 2021-07-14 NOTE — TELEPHONE ENCOUNTER
Informed by Norbetro Aleman LCSW, that appointment for today, 7/14, should be cancelled  Patient had informed her she had another appointment at the same time

## 2021-07-15 ENCOUNTER — OFFICE VISIT (OUTPATIENT)
Dept: PHYSICAL THERAPY | Facility: CLINIC | Age: 26
End: 2021-07-15
Payer: COMMERCIAL

## 2021-07-15 DIAGNOSIS — M54.50 LEFT LOW BACK PAIN, UNSPECIFIED CHRONICITY, UNSPECIFIED WHETHER SCIATICA PRESENT: ICD-10-CM

## 2021-07-15 DIAGNOSIS — R20.2 PARESTHESIA OF LEFT LEG: Primary | ICD-10-CM

## 2021-07-15 PROCEDURE — 97110 THERAPEUTIC EXERCISES: CPT | Performed by: PHYSICAL THERAPIST

## 2021-07-15 PROCEDURE — 97112 NEUROMUSCULAR REEDUCATION: CPT | Performed by: PHYSICAL THERAPIST

## 2021-07-15 PROCEDURE — 97140 MANUAL THERAPY 1/> REGIONS: CPT | Performed by: PHYSICAL THERAPIST

## 2021-07-15 NOTE — PROGRESS NOTES
Daily Note     Today's date: 7/15/2021  Patient name: Wilma Franks  : 1995  MRN: [id number]  Referring provider: MARGARET Hammond  Dx:   Encounter Diagnosis     ICD-10-CM    1  Paresthesia of left leg  R20 2    2  Left low back pain, unspecified chronicity, unspecified whether sciatica present  M54 5                   Subjective: She states she did trial exercises and she has been beginning to put more weight in her ankle  She still is on crutches  Objective: See treatment diary below      Assessment: Tolerated treatment well  Pain science education given and she was given information on sleep hygiene and discussed making a schedule for sleeping and daily routine including eating and exercising to help her blood sugars which she found helpful  Patient would benefit from continued PT  Plan: Continue per plan of care        Goals: Patient's goal is To feel better and do more- walking and wants to know what is possible    Precautions: Fatigue Depression, Bipolar Disorder,  Falls- muscles stiffen up and she falls, seizures, DM 1,   Dx: L leg numbness and L psoas tightness decreased hip IR hypomobility      Daily Treatment Diary     Manuals 2021 7/15       L psoas and tfl STM         L hip PROM prn         Standing hip abd                  Ther Ex         bike  Declined due to ankle pain       LTR  2x10       Pain science ed  8'       KTC L  10x painful will do march nv       Hip ir stool when yolis         Hip abd         Hip ext                           Neuro Re-ed         Towel scrunches  30x       TA contraction  20x        TA c RTB press         TA c march  nv       Glut sets         Ther Activity                                    Gait Training         Treadmill once tolerated WB                  Modalities

## 2021-07-16 ENCOUNTER — APPOINTMENT (OUTPATIENT)
Dept: LAB | Facility: CLINIC | Age: 26
End: 2021-07-16
Payer: COMMERCIAL

## 2021-07-16 DIAGNOSIS — N91.2 ABSENCE OF MENSTRUATION: ICD-10-CM

## 2021-07-16 DIAGNOSIS — Z96.41 INSULIN PUMP STATUS: ICD-10-CM

## 2021-07-16 DIAGNOSIS — R79.89 HYPOURICEMIA: ICD-10-CM

## 2021-07-16 DIAGNOSIS — M62.838 MUSCLE SPASMS OF BOTH LOWER EXTREMITIES: ICD-10-CM

## 2021-07-16 DIAGNOSIS — E10.9 INSULIN DEPENDENT DIABETES MELLITUS TYPE IA (HCC): ICD-10-CM

## 2021-07-16 DIAGNOSIS — E10.9 TYPE 1 DIABETES MELLITUS WITHOUT COMPLICATION (HCC): ICD-10-CM

## 2021-07-16 DIAGNOSIS — R20.0 NUMBNESS AND TINGLING OF BOTH LEGS BELOW KNEES: ICD-10-CM

## 2021-07-16 DIAGNOSIS — R20.2 NUMBNESS AND TINGLING OF BOTH LEGS BELOW KNEES: ICD-10-CM

## 2021-07-16 LAB
ANION GAP SERPL CALCULATED.3IONS-SCNC: 14 MMOL/L (ref 4–13)
BUN SERPL-MCNC: 18 MG/DL (ref 5–25)
CALCIUM SERPL-MCNC: 9.1 MG/DL (ref 8.3–10.1)
CHLORIDE SERPL-SCNC: 103 MMOL/L (ref 100–108)
CO2 SERPL-SCNC: 21 MMOL/L (ref 21–32)
CORTIS SERPL-MCNC: 2.3 UG/DL
CREAT SERPL-MCNC: 0.92 MG/DL (ref 0.6–1.3)
EST. AVERAGE GLUCOSE BLD GHB EST-MCNC: 177 MG/DL
FSH SERPL-ACNC: 0.9 MIU/ML
GFR SERPL CREATININE-BSD FRML MDRD: 87 ML/MIN/1.73SQ M
GLUCOSE P FAST SERPL-MCNC: 175 MG/DL (ref 65–99)
HBA1C MFR BLD: 7.8 %
INSULIN SERPL-ACNC: 106.1 MU/L (ref 3–25)
LH SERPL-ACNC: 0.3 MIU/ML
POTASSIUM SERPL-SCNC: 4.4 MMOL/L (ref 3.5–5.3)
SODIUM SERPL-SCNC: 138 MMOL/L (ref 136–145)
T4 FREE SERPL-MCNC: 1.1 NG/DL (ref 0.76–1.46)
TESTOST SERPL-MCNC: 8 NG/DL
TSH SERPL DL<=0.05 MIU/L-ACNC: 0.68 UIU/ML (ref 0.36–3.74)

## 2021-07-16 PROCEDURE — 83519 RIA NONANTIBODY: CPT

## 2021-07-16 PROCEDURE — 86341 ISLET CELL ANTIBODY: CPT

## 2021-07-16 PROCEDURE — 83002 ASSAY OF GONADOTROPIN (LH): CPT

## 2021-07-16 PROCEDURE — 84445 ASSAY OF TSI GLOBULIN: CPT

## 2021-07-16 PROCEDURE — 80048 BASIC METABOLIC PNL TOTAL CA: CPT

## 2021-07-16 PROCEDURE — 84443 ASSAY THYROID STIM HORMONE: CPT

## 2021-07-16 PROCEDURE — 36415 COLL VENOUS BLD VENIPUNCTURE: CPT

## 2021-07-16 PROCEDURE — 84403 ASSAY OF TOTAL TESTOSTERONE: CPT

## 2021-07-16 PROCEDURE — 3051F HG A1C>EQUAL 7.0%<8.0%: CPT | Performed by: INTERNAL MEDICINE

## 2021-07-16 PROCEDURE — 86800 THYROGLOBULIN ANTIBODY: CPT

## 2021-07-16 PROCEDURE — 83001 ASSAY OF GONADOTROPIN (FSH): CPT

## 2021-07-16 PROCEDURE — 82024 ASSAY OF ACTH: CPT

## 2021-07-16 PROCEDURE — 84432 ASSAY OF THYROGLOBULIN: CPT

## 2021-07-16 PROCEDURE — 86376 MICROSOMAL ANTIBODY EACH: CPT

## 2021-07-16 PROCEDURE — 82533 TOTAL CORTISOL: CPT

## 2021-07-16 PROCEDURE — 83525 ASSAY OF INSULIN: CPT

## 2021-07-16 PROCEDURE — 84439 ASSAY OF FREE THYROXINE: CPT

## 2021-07-16 PROCEDURE — 84681 ASSAY OF C-PEPTIDE: CPT

## 2021-07-16 PROCEDURE — 83036 HEMOGLOBIN GLYCOSYLATED A1C: CPT

## 2021-07-17 LAB
ACTH PLAS-MCNC: <1.5 PG/ML (ref 7.2–63.3)
C PEPTIDE SERPL-MCNC: 0.4 NG/ML (ref 1.1–4.4)
THYROPEROXIDASE AB SERPL-ACNC: 57 IU/ML (ref 0–34)

## 2021-07-18 LAB — TSI SER-ACNC: 0.15 IU/L (ref 0–0.55)

## 2021-07-19 ENCOUNTER — OFFICE VISIT (OUTPATIENT)
Dept: PHYSICAL THERAPY | Facility: CLINIC | Age: 26
End: 2021-07-19
Payer: COMMERCIAL

## 2021-07-19 DIAGNOSIS — R20.2 PARESTHESIA OF LEFT LEG: Primary | ICD-10-CM

## 2021-07-19 DIAGNOSIS — M54.50 LEFT LOW BACK PAIN, UNSPECIFIED CHRONICITY, UNSPECIFIED WHETHER SCIATICA PRESENT: ICD-10-CM

## 2021-07-19 LAB — PANC ISLET CELL AB TITR SER: NEGATIVE {TITER}

## 2021-07-19 PROCEDURE — 97530 THERAPEUTIC ACTIVITIES: CPT | Performed by: PHYSICAL MEDICINE & REHABILITATION

## 2021-07-19 PROCEDURE — 97112 NEUROMUSCULAR REEDUCATION: CPT | Performed by: PHYSICAL MEDICINE & REHABILITATION

## 2021-07-19 PROCEDURE — 97110 THERAPEUTIC EXERCISES: CPT | Performed by: PHYSICAL MEDICINE & REHABILITATION

## 2021-07-19 NOTE — PROGRESS NOTES
Daily Note     Today's date: 2021  Patient name: Fco Ty  : 1995  MRN: [id number]  Referring provider: Dannial Fleischer, CRNP  Dx:   Encounter Diagnosis     ICD-10-CM    1  Paresthesia of left leg  R20 2    2  Left low back pain, unspecified chronicity, unspecified whether sciatica present  M54 5                   Subjective: Patient presents without crutches today, brace in place and continued R ankle pain reported  Patient notes no significant change in back and LLE sxs  Objective: See treatment diary below    Assessment: Tolerated treatment well overall  Activity somewhat limited secondary to R ankle sprain  Patient would benefit from continued PT  Continue to progress as able  Plan: Continue per plan of care        Goals: Patient's goal is To feel better and do more- walking and wants to know what is possible    Precautions: Fatigue Depression, Bipolar Disorder,  Falls- muscles stiffen up and she falls, seizures, DM 1,   Dx: L leg numbness and L psoas tightness decreased hip IR hypomobility      Daily Treatment Diary     Manuals 2021 7/15 7/19      L psoas and tfl STM         L hip PROM prn         Standing hip abd                  Ther Ex         bike  Declined due to ankle pain np      LTR  2x10 2x10      Pain science ed  8'       KTC L  10x painful will do  SKTC, no stretch felt      Hip ir stool when yolis         Hip abd   Stand 10x ea      Hip ext            Hip add iso 3x5x5"               Neuro Re-ed         Towel scrunches  30x 30x      TA contraction  20x  20x5"      TA c RTB press   RTB 10x ea      TA c march  nv 3x5 ea      Glut sets   10x5"         PPT 10x5"      Ther Activity                           Gait Training         Treadmill once tolerated WB                  Modalities

## 2021-07-21 ENCOUNTER — SOCIAL WORK (OUTPATIENT)
Dept: BEHAVIORAL/MENTAL HEALTH CLINIC | Facility: CLINIC | Age: 26
End: 2021-07-21
Payer: COMMERCIAL

## 2021-07-21 DIAGNOSIS — R47.89 WORD FINDING DIFFICULTY: Primary | ICD-10-CM

## 2021-07-21 DIAGNOSIS — F42.2 MIXED OBSESSIONAL THOUGHTS AND ACTS: ICD-10-CM

## 2021-07-21 DIAGNOSIS — F31.63 BIPOLAR DISORDER, CURRENT EPISODE MIXED, SEVERE, WITHOUT PSYCHOTIC FEATURES (HCC): ICD-10-CM

## 2021-07-21 DIAGNOSIS — F41.1 GENERALIZED ANXIETY DISORDER WITH PANIC ATTACKS: ICD-10-CM

## 2021-07-21 DIAGNOSIS — F43.12 CHRONIC POST-TRAUMATIC STRESS DISORDER (PTSD): ICD-10-CM

## 2021-07-21 DIAGNOSIS — F41.0 GENERALIZED ANXIETY DISORDER WITH PANIC ATTACKS: ICD-10-CM

## 2021-07-21 PROCEDURE — 90834 PSYTX W PT 45 MINUTES: CPT | Performed by: SOCIAL WORKER

## 2021-07-21 NOTE — PSYCH
Virtual Regular Visit    Verification of patient location:    Patient is currently located in the state [facility]  Patient is currently located in a state in which I am licensed      Assessment/Plan:    Problem List Items Addressed This Visit        Other    Chronic post-traumatic stress disorder (PTSD)    Generalized anxiety disorder with panic attacks    OCD (obsessive compulsive disorder)    Word finding difficulty - Primary    Bipolar affective disorder ([facility] [address] )          Goals addressed in session: Goal 1, Goal 2 and Goal 3           Reason for visit is No chief complaint on file  Encounter provider Dedra Kapoor    Provider located at [address] [place] [id number]  [phone]      Recent Visits  No visits were found meeting these conditions  Showing recent visits within past 7 days and meeting all other requirements  Future Appointments  No visits were found meeting these conditions  Showing future appointments within next 150 days and meeting all other requirements       The patient was identified by name and date of birth  Jesse Calle was informed that this is a telemedicine visit and that the visit is being conducted [facility] and patient was informed that this is a secure, HIPAA-compliant platform  She agrees to proceed     My office door was closed  No one else was in the room  She acknowledged consent and understanding of privacy and security of the video platform  The patient has agreed to participate and understands they can discontinue the visit at any time  Patient is aware this is a billable service  Subjective  Jesse Calle is a 27 y o  female    D: Met with Gudelia Jerry individually  Still now showering much - pessimistic 'at times'  'I really don't care '  Gudelia Jerry states she hasn't been feeling well - pelvic pain    Discussed recent Endocrinologist visit -   Thoughts of possible hyper pituarism  Discussed PTSD symptoms and fight /flight especially when triggered with ongoing stressors  Experienced a bad NM a few days back - unable to go back to sleep for 2 hrs  Rheumatology suspects connective tissue disease- ordered Plaquenil  Hope to assess if this reduces pain and inflammation  Muscles cramped up lat week and sprained ankle - on crutches with aircast   Denied SI    A: Abby Lan presented more lethargic, ankle is painful and now pelvic pain  With Zack Bottoms going back to [place]- Jenny's sister may move in  PT will research in home nursing to help with devices  P: Continue individual therapy        HPI     Past Medical History:   Diagnosis Date    Abdominal pain     Anxiety     Anxiety and depression     COVID-19 12/2020    Depression     Eating disorder     history of anorexia/bulemia [id number]    Fracture of fibula     R Salter I   Zachery Kevin disease     Hashimoto's disease 2/21/2020    Head injury     Nasal congestion     PTSD (post-traumatic stress disorder)     Rectal bleed     Seizures (HCC)        Past Surgical History:   Procedure Laterality Date    KNEE SURGERY Right 07/06/2020    NASAL SEPTOPLASTY W/ TURBINOPLASTY      SINUS SURGERY      TURBINOPLASTY N/A 3/22/2021    Procedure: TURBINOPLASTY;  Surgeon: Gerald Benson MD;  [facility]: [facility];  Service: ENT    WISDOM TOOTH EXTRACTION      WRIST SURGERY      left;  Excision of ganglion       Current Outpatient Medications   Medication Sig Dispense Refill    ACETONE, URINE, TEST VI Use as needed to test your urine for ketones      albuterol (PROVENTIL HFA,VENTOLIN HFA) 90 mcg/act inhaler Inhale 1 puff every 6 (six) hours as needed for wheezing or shortness of breath 1 Inhaler 5    Altavera 0 15-30 MG-MCG per tablet TAKE ONE TABLET BY MOUTH EVERY DAY 84 tablet 3    amitriptyline (ELAVIL) 10 mg tablet TAKE 1/2 TABLET AT BEDTIME 15 tablet 2    ARIPiprazole (ABILIFY) 10 mg tablet TAKE 1 TABLET BY MOUTH EVERY DAY 30 tablet 2    Blood Glucose Monitoring Suppl (Estefanía Herrera) w/Device KIT Use as directed  0    Cholecalciferol (Vitamin D3) 1 25 MG (62025 UT) CAPS Take 50,000 Units by mouth once a week       Cholecalciferol (Vitamin D3) 50 MCG (2000 UT) TABS Take 2,000 Units by mouth daily 6 days a week      clonazePAM (KlonoPIN) 0 5 mg tablet Take 1 tablet (0 5 mg total) by mouth 2 (two) times a day 45 tablet 2    desvenlafaxine succinate (PRISTIQ) 50 mg 24 hr tablet Take 1 tablet (50 mg total) by mouth daily 30 tablet 2    divalproex sodium (DEPAKOTE ER) 500 mg 24 hr tablet Take 2 tablets (1,000 mg total) by mouth daily 60 tablet 2    EPINEPHrine (EPIPEN) 0 3 mg/0 3 mL SOAJ Inject 0 3 mL (0 3 mg total) into a muscle once for 1 dose 0 6 mL 0    ergocalciferol (VITAMIN D2) 50,000 units 50,000 Units once a week      folic acid (FOLVITE) 1 mg tablet Take 1 mg by mouth daily      glucagon (GLUCAGON EMERGENCY) 1 MG injection Inject 1 mg under the skin once as needed for low blood sugar for up to 2 doses 1 kit 1    hydroxychloroquine (PLAQUENIL) 200 mg tablet Take 1 tablet (200 mg total) by mouth 2 (two) times a day with meals 60 tablet 5    ibuprofen (MOTRIN) 400 mg tablet Take 1 tablet (400 mg total) by mouth every 6 (six) hours as needed for mild pain for up to 3 days 12 tablet 0    insulin aspart (NovoLOG) 100 units/mL injection Use 30 units per day via pump Disp 1 vial per month (Patient taking differently: Use 100units per day via pump Disp 2 vial per month) 30 mL 1    insulin degludec (TRESIBA) 100 units/mL injection pen 34 units once daily E10 65      Insulin Infusion Pump KIT 13 mm cannula, 23 inch tubing change site every 3 days      Insulin Pen Needle (BD PEN NEEDLE NASIM U/F) 32G X 4 MM MISC by Does not apply route 4 (four) times a day for 180 days 400 each 3    Insulin Syringe-Needle U-100 31G X 5/16" 0 5 ML MISC Use to draw up your insulin      Lancets MISC Use      levothyroxine 25 mcg tablet Take 1 tablet (25 mcg total) by mouth daily 60 tablet 0    magnesium oxide (MAG-OX) 400 mg tablet Take 1 tablet by mouth daily      magnesium oxide (MAG-OX) 400 mg Take 1 tablet (400 mg total) by mouth daily 90 tablet 0    Multiple Vitamin (DAILY VALUE MULTIVITAMIN) TABS Take by mouth daily       ONETOUCH DELICA LANCETS 03P MISC Patient test 6 times daily 600 each 1    ONETOUCH VERIO test strip Test six times a day 600 each 3     No current facility-administered medications for this visit  Allergies   Allergen Reactions    Iodine - Food Allergy Throat Swelling    Insulin Glargine Other (See Comments)     Burning and redness under skin       Review of Systems        I spent 50 minutes directly with the patient during this visit    VIRTUAL VISIT DISCLAIMER    Wilmer Barr verbally agrees to participate in [employer]  Pt is aware that [employer] could be limited without vital signs or the ability to perform a full hands-on physical exam  Jenny Rodrigues understands she or the provider may request at any time to terminate the video visit and request the patient to seek care or treatment in person

## 2021-07-22 LAB — GAD65 AB SER-ACNC: 117.2 U/ML (ref 0–5)

## 2021-07-25 LAB
THYROGLOB AB SERPL-ACNC: 4.6 IU/ML (ref 0–0.9)
THYROGLOB SERPL-MCNC: 6.9 NG/ML

## 2021-07-28 ENCOUNTER — SOCIAL WORK (OUTPATIENT)
Dept: BEHAVIORAL/MENTAL HEALTH CLINIC | Facility: CLINIC | Age: 26
End: 2021-07-28
Payer: COMMERCIAL

## 2021-07-28 ENCOUNTER — APPOINTMENT (OUTPATIENT)
Dept: PHYSICAL THERAPY | Facility: CLINIC | Age: 26
End: 2021-07-28
Payer: COMMERCIAL

## 2021-07-28 DIAGNOSIS — F42.2 MIXED OBSESSIONAL THOUGHTS AND ACTS: Primary | ICD-10-CM

## 2021-07-28 DIAGNOSIS — F31.63 BIPOLAR DISORDER, CURRENT EPISODE MIXED, SEVERE, WITHOUT PSYCHOTIC FEATURES (HCC): ICD-10-CM

## 2021-07-28 DIAGNOSIS — F41.0 GENERALIZED ANXIETY DISORDER WITH PANIC ATTACKS: ICD-10-CM

## 2021-07-28 DIAGNOSIS — F43.12 CHRONIC POST-TRAUMATIC STRESS DISORDER (PTSD): ICD-10-CM

## 2021-07-28 DIAGNOSIS — F41.1 GENERALIZED ANXIETY DISORDER WITH PANIC ATTACKS: ICD-10-CM

## 2021-07-28 PROCEDURE — 90834 PSYTX W PT 45 MINUTES: CPT | Performed by: SOCIAL WORKER

## 2021-07-29 ENCOUNTER — TRANSCRIBE ORDERS (OUTPATIENT)
Dept: PAIN MEDICINE | Facility: CLINIC | Age: 26
End: 2021-07-29

## 2021-07-29 NOTE — PSYCH
Psychotherapy Provided: Individual Psychotherapy 50 minutes     Length of time in session: 50 minutes, follow up in 1 week    Encounter Diagnosis     ICD-10-CM    1  Mixed obsessional thoughts and acts  F42 2    2  Generalized anxiety disorder with panic attacks  F41 1     F41 0    3  Chronic post-traumatic stress disorder (PTSD)  F43 12    4  Bipolar disorder, current episode mixed, severe, without psychotic features ([facility] [address] )  F31 63        Goals addressed in session: Goal 1, Goal 2 and Goal 3      Pain:      none    0    Current suicide risk : Low     D: Met with Jenny individually  Discussed changes with Benigno Manzano going to [place] for several months, moving in with sister Rhoderick Koyanagi can't live alone) and anxiety surrounding the change  PTSD symptoms have acerbated since dad has been inquiring about Rosendo Hermosillo - wants to help her financially all of a sudden  Processed changes being 'frightening' but Rosendo Hermosillo isn't convinced she wants to engage in any of this  Denied SI    A: Rosendo Hermosillo presented with inflammation of joints and body was more swollen due to steroids  P: Continue individual therapy    Behavioral Health Treatment Plan [facility]: Diagnosis and Treatment Plan explained to Rayshawn Jose relates understanding diagnosis and is agreeable to Treatment Plan   Yes

## 2021-07-30 ENCOUNTER — ANNUAL EXAM (OUTPATIENT)
Dept: OBGYN CLINIC | Facility: CLINIC | Age: 26
End: 2021-07-30
Payer: COMMERCIAL

## 2021-07-30 VITALS
SYSTOLIC BLOOD PRESSURE: 104 MMHG | DIASTOLIC BLOOD PRESSURE: 80 MMHG | BODY MASS INDEX: 29.55 KG/M2 | WEIGHT: 160.6 LBS | HEIGHT: 62 IN

## 2021-07-30 DIAGNOSIS — Z01.419 ENCOUNTER FOR ANNUAL ROUTINE GYNECOLOGICAL EXAMINATION: Primary | ICD-10-CM

## 2021-07-30 DIAGNOSIS — N94.819 VULVODYNIA: ICD-10-CM

## 2021-07-30 DIAGNOSIS — L81.9 ATYPICAL PIGMENTED SKIN LESION: ICD-10-CM

## 2021-07-30 PROCEDURE — 0503F POSTPARTUM CARE VISIT: CPT | Performed by: OBSTETRICS & GYNECOLOGY

## 2021-07-30 PROCEDURE — G0145 SCR C/V CYTO,THINLAYER,RESCR: HCPCS | Performed by: OBSTETRICS & GYNECOLOGY

## 2021-07-30 PROCEDURE — 99395 PREV VISIT EST AGE 18-39: CPT | Performed by: OBSTETRICS & GYNECOLOGY

## 2021-08-02 ENCOUNTER — OFFICE VISIT (OUTPATIENT)
Dept: PSYCHIATRY | Facility: CLINIC | Age: 26
End: 2021-08-02
Payer: COMMERCIAL

## 2021-08-02 DIAGNOSIS — F42.2 MIXED OBSESSIONAL THOUGHTS AND ACTS: ICD-10-CM

## 2021-08-02 DIAGNOSIS — F31.0 BIPOLAR DISORDER, CURRENT EPISODE HYPOMANIC (HCC): Primary | ICD-10-CM

## 2021-08-02 DIAGNOSIS — F41.0 GENERALIZED ANXIETY DISORDER WITH PANIC ATTACKS: ICD-10-CM

## 2021-08-02 DIAGNOSIS — F43.10 PTSD (POST-TRAUMATIC STRESS DISORDER): ICD-10-CM

## 2021-08-02 DIAGNOSIS — F41.1 GENERALIZED ANXIETY DISORDER WITH PANIC ATTACKS: ICD-10-CM

## 2021-08-02 PROBLEM — Z96.41 DIABETES MELLITUS TYPE 1, UNCOMPLICATED, ON LONG TERM INSULIN PUMP (HCC): Status: ACTIVE | Noted: 2021-07-17

## 2021-08-02 PROBLEM — E10.9 DIABETES MELLITUS TYPE 1, UNCOMPLICATED, ON LONG TERM INSULIN PUMP (HCC): Status: ACTIVE | Noted: 2021-07-17

## 2021-08-02 PROBLEM — E11.10 DKA (DIABETIC KETOACIDOSES): Status: ACTIVE | Noted: 2021-05-11

## 2021-08-02 PROBLEM — R44.1 VISUAL HALLUCINATIONS: Status: ACTIVE | Noted: 2021-05-11

## 2021-08-02 PROBLEM — R79.89 ELEVATED PROLACTIN LEVEL: Status: ACTIVE | Noted: 2021-07-14

## 2021-08-02 PROCEDURE — 99213 OFFICE O/P EST LOW 20 MIN: CPT | Performed by: PSYCHIATRY & NEUROLOGY

## 2021-08-02 RX ORDER — CLONAZEPAM 0.5 MG/1
0.5 TABLET ORAL 2 TIMES DAILY
Qty: 45 TABLET | Refills: 2 | Status: SHIPPED | OUTPATIENT
Start: 2021-08-02 | End: 2021-11-02

## 2021-08-02 RX ORDER — DIVALPROEX SODIUM 250 MG/1
250 TABLET, EXTENDED RELEASE ORAL
Qty: 30 TABLET | Refills: 2 | Status: SHIPPED | OUTPATIENT
Start: 2021-08-02 | End: 2021-11-02 | Stop reason: SDUPTHER

## 2021-08-02 RX ORDER — DESVENLAFAXINE 50 MG/1
50 TABLET, EXTENDED RELEASE ORAL DAILY
Qty: 30 TABLET | Refills: 2 | Status: SHIPPED | OUTPATIENT
Start: 2021-08-02 | End: 2021-11-02

## 2021-08-02 RX ORDER — DIVALPROEX SODIUM 500 MG/1
500 TABLET, EXTENDED RELEASE ORAL DAILY
Qty: 30 TABLET | Refills: 2 | Status: SHIPPED | OUTPATIENT
Start: 2021-08-02 | End: 2021-11-02 | Stop reason: SDUPTHER

## 2021-08-02 NOTE — PSYCH
Subjective: Medication Management      Patient ID: Daisy Magaña is a 27 y o  female  HPI ROS Appetite Changes and Sleep: normal appetite, normal energy level, no weight change and normal number of sleep hours   Since last seen Shivani Bridges stated her mood continues to be up and and down with depressed and manic symptoms  Reported labile mood, poor sleep at times, less impulsivity since she no longer has access to her credit cards  She remains compliant with her medications  She was in the hospital last May due to diabetic DKA and psychiatry was consulted  While in the hospital she was tried on a 1,000 mg of Depakote daily but she was not able to tolerate the dose increase due to medication side effects and she back down on the dose to 750 mg daily  She stated she had extensive blood workup at the hospital and is now being followed by endocrinology due to elevated ACTH , cortisol and prolactin  She feels that maybe her mood fluctuations have a medical etiology and for now agrees to continue current medications as prescribed and to continue close follow up with her medical specialist  Will schedule follow up in 3 months  She plans to attend fall classes on line and continues to meet with her counselor on a regular basis  Also disability application is in progress  Review Of Systems:     Mood Emotional Lability   Behavior Impulsive Behavior   Thought Content Disturbing Thoughts, Feelings   General Emotional Problems and Decreased Functioning   Personality Change in Personality   Other Psych Symptoms Normal   Constitutional Negative   ENT Negative   Cardiovascular Negative   Respiratory Negative   Gastrointestinal Negative   Genitourinary Negative   Musculoskeletal Negative   Integumentary Negative   Neurological Negative   Endocrine Normal    Other Symptoms Normal              Laboratory Results: No results found for this or any previous visit      Substance Abuse History:  Social History     Substance and Sexual Activity   Drug Use Never       Family Psychiatric History:   Family History   Problem Relation Age of Onset    Hypertension Mother     Migraines Mother         Headache    Diabetes type II Mother     Varicose Veins Mother     Hyperlipidemia Mother    Areta Emmer Diabetes Mother    Areta Emmer Arthritis Mother     Depression Mother     Cholelithiasis Father     Hypertension Father     Sarcoidosis Father         Liver    Hyperlipidemia Father     Diabetes Father     Coronary artery disease Father     Nephrolithiasis Father     Cirrhosis Father     Alcohol abuse Father     Thyroid disease Sister     Hashimoto's thyroiditis Sister     Cancer Family     Diabetes Family     Hypertension Family     Alcohol abuse Brother        The following portions of the patient's history were reviewed and updated as appropriate: allergies, current medications, past family history, past medical history, past social history, past surgical history and problem list     Social History     Socioeconomic History    Marital status: Single     Spouse name: Not on file    Number of children: 0    Years of education: Not on file    Highest education level: Associate degree: academic program   Occupational History    Occupation: unemployed   Tobacco Use    Smoking status: Never Smoker    Smokeless tobacco: Never Used    Tobacco comment: Tobacco smoke exposure (Father smokes cigars)   Vaping Use    Vaping Use: Never used   Substance and Sexual Activity    Alcohol use: Never    Drug use: Never    Sexual activity: Yes     Partners: Male     Birth control/protection: Condom Male   Other Topics Concern    Not on file   Social History Narrative    Student at [employer] a poor diet; low in vegetables, high in sweets    Dental care, regularly    Lives with parents    Sleeps 8-10 hours a day     Social Determinants of Health     Financial Resource Strain: Medium Risk    Difficulty of Paying Living Expenses: Somewhat hard   Food Insecurity:     Worried About Running Out of Food in the Last Year:     [address] in the Last Year:    Transportation Needs:     Lack of Transportation (Medical):      Lack of Transportation (Non-Medical):    Physical Activity: Insufficiently Active    Days of Exercise per Week: 7 days    Minutes of Exercise per Session: 10 min   Stress: Stress Concern Present    Feeling of Stress : Rather much   Social Connections: Unknown    Frequency of Communication with Friends and Family: More than three times a week    Frequency of Social Gatherings with Friends and Family: Not on file    Attends Buddhist Services: Not on file    Active Member of Clubs or Organizations: No    Attends Club or Organization Meetings: Never    Marital Status: Never    Intimate Partner Violence: Not At Risk    Fear of Current or Ex-Partner: No    Emotionally Abused: No    Physically Abused: No    Sexually Abused: No     Social History     Social History Narrative    Student at [employer] a poor diet; low in vegetables, high in sweets    Dental care, regularly    Lives with parents    Sleeps 8-10 hours a day       Objective:       Mental status:  Appearance calm and cooperative , adequate hygiene and grooming and poor eye contact    Mood dysphoric and anxious   Affect affect was constricted   Speech a normal rate and fluent   Thought Processes coherent/organized and normal thought processes   Hallucinations no hallucinations present    Thought Content no delusions   Abnormal Thoughts no suicidal thoughts  and no homicidal thoughts    Orientation  oriented to person and place and time   Remote Memory short term memory intact and long term memory intact   Attention Span concentration intact   Intellect Appears to be of Average Intelligence   Insight Limited insight   Judgement judgment was limited   Muscle Strength Muscle strength and tone were normal and Normal gait    Language no difficulty naming common objects and no difficulty repeating a phrase    Fund of Knowledge displays adequate knowledge of current events               Assessment/Plan:       Diagnoses and all orders for this visit:    Bipolar disorder, current episode hypomanic ([address] )  -     divalproex sodium (DEPAKOTE ER) 500 mg 24 hr tablet; Take 1 tablet (500 mg total) by mouth daily  -     divalproex sodium (DEPAKOTE ER) 250 mg 24 hr tablet; Take 1 tablet (250 mg total) by mouth daily at bedtime    Generalized anxiety disorder with panic attacks  -     clonazePAM (KlonoPIN) 0 5 mg tablet; Take 1 tablet (0 5 mg total) by mouth 2 (two) times a day  -     desvenlafaxine succinate (PRISTIQ) 50 mg 24 hr tablet; Take 1 tablet (50 mg total) by mouth daily    PTSD (post-traumatic stress disorder)  -     desvenlafaxine succinate (PRISTIQ) 50 mg 24 hr tablet; Take 1 tablet (50 mg total) by mouth daily    Mixed obsessional thoughts and acts  -     desvenlafaxine succinate (PRISTIQ) 50 mg 24 hr tablet; Take 1 tablet (50 mg total) by mouth daily            Treatment Recommendations- Risks Benefits      Immediate Medical/Psychiatric/Psychotherapy Treatments and Any Precautions: continue current treatment   Risks, Benefits And Possible Side Effects Of Medications:  {PSYCH RISK, BENEFITS AND POSSIBLE SIDE EFFECTS (Optional):81333    Controlled Medication Discussion: Discussed with patient Black Box warning on concurrent use of benzodiazepines and opioid medications including sedation, respiratory depression, coma and death  Patient understands the risk of treatment with benzodiazepines in addition to opioids and wants to continue taking those medications  , Discussed with patient the risks of sedation, respiratory depression, impairment of ability to drive and potential for abuse and addiction related to treatment with benzodiazepine medications   The patient understands risk of treatment with benzodiazepine medications, agrees to not drive if feels impaired and agrees to take medications as prescribed  and The patient has been filling controlled prescriptions on time as prescribed to [facility]  program       Psychotherapy Provided:     Individual psychotherapy provided: Yes  Counseling was provided during the session today for 16 minutes  Medications, treatment progress and treatment plan reviewed with Jennifer Hyde  Medication education provided to Jennifer Hyde  Goals discussed during in session: continue improvement in mood stability  Recent stressor including COVID-19 issues, school stress, health issues, medical problems, limited support, social difficulties, everyday stressors, ongoing anxiety and chronic mental illness discussed with Jennifer Hyde  Coping strategies including compliance with medications, contacting a therapist, deep/slow breathing, eliminating avoidance, engaging in previously avoided activities, exercising, getting into a good routine, improving self-esteem, increasing energy, increasing interest in usual activities, increasing motivation, increasing social interaction, keeping busy at home, maintain healthy diet, maintain heathy sleeping hygiene and maintain positive attitude reviewed with Jennifer Hyde  Importance of medication and treatment compliance reviewed with Jennifer Hyde  Educated on importance of medication and treatment compliance  Importance of follow up with family physician for medical issues reviewed with Jennifer Hyde  Discussed with Jennifer Hyde acceptance of mental illness diagnosis and need for ongoing psychiatric treatment  Supportive therapy provided

## 2021-08-03 ENCOUNTER — TELEPHONE (OUTPATIENT)
Dept: PSYCHIATRY | Facility: CLINIC | Age: 26
End: 2021-08-03

## 2021-08-03 NOTE — TELEPHONE ENCOUNTER
LVM  Patient needs a 3 month f/u with Dr Karuna Dougherty   Please schedule and ask if she'd like it to be virtual

## 2021-08-04 ENCOUNTER — OFFICE VISIT (OUTPATIENT)
Dept: GASTROENTEROLOGY | Facility: CLINIC | Age: 26
End: 2021-08-04
Payer: COMMERCIAL

## 2021-08-04 VITALS
SYSTOLIC BLOOD PRESSURE: 110 MMHG | HEART RATE: 47 BPM | BODY MASS INDEX: 29.88 KG/M2 | WEIGHT: 162.4 LBS | HEIGHT: 62 IN | DIASTOLIC BLOOD PRESSURE: 80 MMHG | TEMPERATURE: 96.9 F

## 2021-08-04 DIAGNOSIS — R10.11 RIGHT UPPER QUADRANT ABDOMINAL PAIN: Primary | ICD-10-CM

## 2021-08-04 DIAGNOSIS — K58.0 IRRITABLE BOWEL SYNDROME WITH DIARRHEA: ICD-10-CM

## 2021-08-04 DIAGNOSIS — R19.4 CHANGE IN BOWEL HABITS: ICD-10-CM

## 2021-08-04 DIAGNOSIS — K92.1 BLOOD IN THE STOOL: ICD-10-CM

## 2021-08-04 PROCEDURE — 3008F BODY MASS INDEX DOCD: CPT | Performed by: INTERNAL MEDICINE

## 2021-08-04 PROCEDURE — 99214 OFFICE O/P EST MOD 30 MIN: CPT | Performed by: PHYSICIAN ASSISTANT

## 2021-08-04 RX ORDER — AMITRIPTYLINE HYDROCHLORIDE 10 MG/1
10 TABLET, FILM COATED ORAL
Qty: 30 TABLET | Refills: 11 | Status: SHIPPED | OUTPATIENT
Start: 2021-08-04 | End: 2021-11-02

## 2021-08-04 NOTE — LETTER
August 4, 2021     Suzanne Barnes, [address]    Patient: Robert Jang   YOB: 1995   Date of Visit: 8/4/2021       Dear Dr Juliana Torres: Thank you for referring Robert Jang to me for evaluation  Below are my notes for this consultation  If you have questions, please do not hesitate to call me  I look forward to following your patient along with you  Sincerely,        Elliot Velazquez PA-C        CC: No Recipients  Elliot Velazquez PA-C  8/4/2021  9:33 AM  Sign when Signing Visit  [facility] - Outpatient Follow-up Note  Robert Jang 27 y o  female MRN: [id number]  Encounter: [id number]          ASSESSMENT AND PLAN:      1  Right upper quadrant pain  She describes longstanding postprandial RUQ pain  No evidence of gallstones on ultrasound  Liver enzymes and lipase normal  She states she had EGD in 2019 with Dr Jocelyn Monroy that was negative for peptic ulcer and H pylori  Suspect she may have biliary dyskinesia  We will obtain HIDA scan  If this is normal, would obtain gastric emptying study  She avoids NSAIDs  She already tried and failed PPI therapy  2  Blood in the stool  She reports occasional bright red blood on her stool  She denies seeing any blood mixed with her stool  Suspect this could be hemorrhoid related  She had a colonoscopy with TI intubation in 2019 that was completely normal  No evidence of inflammatory bowel disease, polyps, or AVMs  Recent CBC was completely normal  She should monitor her symptoms for now  - Ambulatory referral to Gastroenterology    3  Change in bowel habits  She reports increased frequently of BMs daily and occasional fatty, oily, floating stools  She had evaluation back in 2019 for this problem  Colonoscopy with TI intubation was negative for inflammatory bowel disease  CBC normal  Celiac serologies negative  Stool testing for pancreatic insufficiency negative   Her bowel issues could be related to IBS +/- diabetic enteropathy +/- small intestinal bacterial overgrowth  Since she has tried and failed Bentyl and Imodium, would recommend empiric treatment with Xifaxan for 2 weeks  If we cannot get coverage for Xifaxan, would recommend SIBO breath testing     - Ambulatory referral to Gastroenterology    4  Irritable bowel syndrome with diarrhea    - rifaximin (XIFAXAN) 550 mg tablet; Take 1 tablet (550 mg total) by mouth every 8 (eight) hours for 14 days  Dispense: 42 tablet; Refill: 2    Follow-up in 3 months  ______________________________________________________________________    SUBJECTIVE:    73-year-old female with multiple medical issues including type 1 insulin dependent diabetes mellitus with hyperglycemia, Hashimoto's disease, low back pain, paresthesias, psychiatric disease referred for evaluation of blood in stool and fatty stools  She saw Dr Krystin Campbell, Dr Missy Cueto, and Dr Debora Sears in the past for these symptoms and had EGD and colonoscopy which were both apparently normal  I have a colonoscopy per report but cannot view the EGD report  She states she tested negative for H pylori  She has bowel movements 4-5 times per day  She states the stools are generally yellowish in color and formed  She does occasionally have fatty, oily floating stools but this occurs randomly  She occasionally sees bright red blood on her stool but not mixed with the stool  She has chronic right upper quadrant postprandial abdominal pain  The pain starts within 20-30 minutes of eating  She has associated nausea  She does report early satiety as well  She takes Zofran as needed which is helpful  REVIEW OF SYSTEMS IS OTHERWISE NEGATIVE        Historical Information   Past Medical History:   Diagnosis Date    Abdominal pain     Anxiety     Anxiety and depression     COVID-19 12/2020    Depression     Eating disorder     history of anorexia/bulemia 4257-8269    Fracture of sadiq Gibson I Patricia Flash disease     Hashimoto's disease 2/21/2020    Head injury     Nasal congestion     PTSD (post-traumatic stress disorder)     Rectal bleed     Seizures (HCC)      Past Surgical History:   Procedure Laterality Date    COLONOSCOPY      KNEE SURGERY Right 07/06/2020    NASAL SEPTOPLASTY W/ TURBINOPLASTY      SINUS SURGERY      TURBINOPLASTY N/A 3/22/2021    Procedure: TURBINOPLASTY;  Surgeon: Lala Sandra MD;  [facility];  Service: ENT    UPPER GASTROINTESTINAL ENDOSCOPY      WISDOM TOOTH EXTRACTION      WRIST SURGERY      left;  Excision of ganglion     Social History   Social History     Substance and Sexual Activity   Alcohol Use Never     Social History     Substance and Sexual Activity   Drug Use Never     Social History     Tobacco Use   Smoking Status Never Smoker   Smokeless Tobacco Never Used   Tobacco Comment    Tobacco smoke exposure (Father smokes cigars)     Family History   Problem Relation Age of Onset    Hypertension Mother     Migraines Mother         Headache    Diabetes type II Mother     Varicose Veins Mother     Hyperlipidemia Mother     Diabetes Mother     Arthritis Mother     Depression Mother     Cholelithiasis Father     Hypertension Father     Sarcoidosis Father         Liver    Hyperlipidemia Father     Diabetes Father     Coronary artery disease Father     Nephrolithiasis Father     Cirrhosis Father     Alcohol abuse Father     Thyroid disease Sister     Hashimoto's thyroiditis Sister     Cancer Family     Diabetes Family     Hypertension Family     Alcohol abuse Brother        Meds/Allergies       Current Outpatient Medications:     ACETONE, URINE, TEST VI    albuterol (PROVENTIL HFA,VENTOLIN HFA) 90 mcg/act inhaler    Altavera 0 15-30 MG-MCG per tablet    ARIPiprazole (ABILIFY) 10 mg tablet    Blood Glucose Monitoring Suppl (ONETOUCH VERIO) w/Device KIT    Cholecalciferol (Vitamin D3) 1 25 MG (68325 UT) CAPS   Cholecalciferol (Vitamin D3) 50 MCG (2000 UT) TABS    clonazePAM (KlonoPIN) 0 5 mg tablet    desvenlafaxine succinate (PRISTIQ) 50 mg 24 hr tablet    divalproex sodium (DEPAKOTE ER) 250 mg 24 hr tablet    divalproex sodium (DEPAKOTE ER) 500 mg 24 hr tablet    ergocalciferol (VITAMIN D2) 74,724 units    folic acid (FOLVITE) 1 mg tablet    glucagon (GLUCAGON EMERGENCY) 1 MG injection    hydroxychloroquine (PLAQUENIL) 200 mg tablet    insulin aspart (NovoLOG) 100 units/mL injection    insulin degludec (TRESIBA) 100 units/mL injection pen    Insulin Infusion Pump KIT    Insulin Syringe-Needle U-100 31G X 5/16" 0 5 ML MISC    Lancets MISC    levothyroxine 25 mcg tablet    magnesium oxide (MAG-OX) 400 mg tablet    magnesium oxide (MAG-OX) 400 mg    Multiple Vitamin (DAILY VALUE MULTIVITAMIN) TABS    ONETOUCH DELICA LANCETS 55S MISC    ONETOUCH VERIO test strip    amitriptyline (ELAVIL) 10 mg tablet    EPINEPHrine (EPIPEN) 0 3 mg/0 3 mL SOAJ    ibuprofen (MOTRIN) 400 mg tablet    Insulin Pen Needle (BD PEN NEEDLE NASIM U/F) 32G X 4 MM MISC    rifaximin (XIFAXAN) 550 mg tablet    Allergies   Allergen Reactions    Iodine - Food Allergy Throat Swelling    Insulin Glargine Other (See Comments)     Burning and redness under skin           Objective     Blood pressure 110/80, pulse (!) 47, temperature (!) 96 9 °F (36 1 °C), temperature source Tympanic, height 5' 2 25" (1 581 m), weight 73 7 kg (162 lb 6 4 oz), not currently breastfeeding  Body mass index is 29 47 kg/m²  PHYSICAL EXAM:      General Appearance:   Alert, cooperative, no distress   HEENT:   Normocephalic, atraumatic, anicteric      Neck:  Supple, symmetrical, trachea midline   Lungs:   Clear to auscultation bilaterally   Heart[de-identified]   Regular rate and rhythm   Abdomen:   Non-distended  Normal bowel sounds  Soft  Mild generalized tenderness to palpation   No rebound or guarding     Genitalia:   Deferred    Rectal:   Deferred    Extremities:  No cyanosis, clubbing or edema    Pulses:  2+ and symmetric    Skin:  No jaundice, rashes, or lesions    Lymph nodes:  No palpable cervical lymphadenopathy        Lab Results:   No visits with results within 1 Day(s) from this visit  Latest known visit with results is:   Appointment on 07/16/2021   Component Date Value    C-Peptide 07/16/2021 0 4*    Glutaminc Acid Decarboxy* 07/16/2021 117 2*    Hemoglobin A1C 07/16/2021 7 8*    EAG 07/16/2021 177     Insulin, Fasting 07/16/2021 106 1*    Islet Cell Ab 07/16/2021 Negative     Sodium 07/16/2021 138     Potassium 07/16/2021 4 4     Chloride 07/16/2021 103     CO2 07/16/2021 21     ANION GAP 07/16/2021 14*    BUN 07/16/2021 18     Creatinine 07/16/2021 0 92     Glucose, Fasting 07/16/2021 175*    Calcium 07/16/2021 9 1     eGFR 07/16/2021 87     THYROID MICROSOMAL ANTIB* 07/16/2021 57*    TSH 3RD GENERATON 07/16/2021 0 677     Thyroglobulin Ab 07/16/2021 4 6*    TSI 07/16/2021 0 15     Free T4 07/16/2021 1 10     FSH 07/16/2021 0 9     LH 07/16/2021 0 3     Testosterone 07/16/2021 8 0     Cortisol, Random 07/16/2021 2 3*    ACTH 07/16/2021 <1 5*    THYROGLOBULIN (TG-BEVERLY) 07/16/2021 6 9          Radiology Results:   XR ankle 3+ vw right    Result Date: 7/9/2021  Narrative: RIGHT ANKLE INDICATION:   left ankle pain; no midfoot tenderness  COMPARISON:  None VIEWS:  XR ANKLE 3+ VW RIGHT Images: 3 FINDINGS: There is no acute fracture or dislocation  No significant degenerative changes  No lytic or blastic osseous lesion  Soft tissues are unremarkable  Impression: No acute osseous abnormality   Workstation performed: [id number]     Answers for HPI/ROS submitted by the patient on 8/3/2021  Chronicity: chronic  Onset: more than 1 year ago  Onset quality: undetermined  Frequency: constantly  Progression since onset: waxing and waning  Pain location: RUQ  Pain - numeric: 2/10  Pain quality: cramping, sharp  Radiates to: RLQ  anorexia: No  arthralgias: Yes  belching: No  constipation: No  diarrhea: No  dysuria: No  fever: No  flatus: No  frequency: No  headaches: No  hematochezia: Yes  hematuria: No  melena: No  myalgias: Yes  nausea: Yes  weight loss: No  vomiting: No  Aggravated by: nothing  Relieved by: nothing

## 2021-08-04 NOTE — PROGRESS NOTES
Assessment/Plan:    1  Encounter for annual routine gynecological examination    - Liquid-based pap, screening    2  Atypical pigmented skin lesion    - Ambulatory referral to Dermatology; Future    3  Vulvodynia    - amitriptyline (ELAVIL) 10 mg tablet; Take 1 tablet (10 mg total) by mouth daily at bedtime  Dispense: 30 tablet; Refill: 11      Subjective      Martha Scales is a 27 y o  female who presents for annual exam  Periods are still absent  Dysmenorrhea:none  Cyclic symptoms include none  She states vulvar burning has resolved  She was able to tolerate penetration - still painful but improved  Current contraception: none  History of abnormal Pap smear: no  Regular self breast exam: yes  History of abnormal mammogram: no  History of abnormal lipids: no  Menstrual History:  OB History        0    Para   0    Term   0       0    AB   0    Living   0       SAB   0    TAB   0    Ectopic   0    Multiple   0    Live Births   0           Obstetric Comments   Menarche age 10            No LMP recorded  (Menstrual status: Amenorrheic other)       Past Medical History:   Diagnosis Date    Abdominal pain     Anxiety     Anxiety and depression     COVID-19 2020    Depression     Eating disorder     history of anorexia/bulemia 9356-9378    Fracture of fibula     R Salter I   Jeppie Chidi disease     Hashimoto's disease 2020    Head injury     Nasal congestion     PTSD (post-traumatic stress disorder)     Rectal bleed     Seizures ([address] )        Family History   Problem Relation Age of Onset    Hypertension Mother    Mollie Sizer Migraines Mother         Headache    Diabetes type II Mother     Varicose Veins Mother     Hyperlipidemia Mother    Mollie Sizer Diabetes Mother    Mollie Sizer Arthritis Mother     Depression Mother     Cholelithiasis Father     Hypertension Father     Sarcoidosis Father         Liver    Hyperlipidemia Father     Diabetes Father     Coronary artery disease Father     Nephrolithiasis Father     Cirrhosis Father     Alcohol abuse Father     Thyroid disease Sister     Hashimoto's thyroiditis Sister     Cancer Family     Diabetes Family     Hypertension Family     Alcohol abuse Brother      The following portions of the patient's history were reviewed and updated as appropriate: allergies, current medications, past family history, past medical history, past social history, past surgical history and problem list     Review of Systems  Pertinent items are noted in HPI        Objective      /80 (BP Location: Left arm, Patient Position: Sitting, Cuff Size: Standard)   Ht 5' 2 25" (1 581 m)   Wt 72 8 kg (160 lb 9 6 oz)   BMI 29 14 kg/m²     General:   alert and oriented, in no acute distress   Heart:    Breasts: regular rate and rhythm, S1, S2 normal, no murmur, click, rub or gallop   appear normal, no suspicious masses, no skin or nipple changes or axillary nodes; dark raised mole on torso   Lungs: clear to auscultation bilaterally   Abdomen: soft, non-tender, without masses or organomegaly   Vulva: normal   Vagina: normal mucosa   Cervix: no lesions   Uterus: normal size, mobile, non-tender   Adnexa: normal adnexa and no mass, fullness, tenderness

## 2021-08-04 NOTE — PROGRESS NOTES
[facility] - Outpatient Follow-up Note  Stefan Avelar 27 y o  female MRN: [id number]  Encounter: [id number]          ASSESSMENT AND PLAN:      1  Right upper quadrant pain  She describes longstanding postprandial RUQ pain  No evidence of gallstones on ultrasound  Liver enzymes and lipase normal  She states she had EGD in 2019 with Dr Carson Myles that was negative for peptic ulcer and H pylori  Suspect she may have biliary dyskinesia  We will obtain HIDA scan  If this is normal, would obtain gastric emptying study  She avoids NSAIDs  She already tried and failed PPI therapy  2  Blood in the stool  She reports occasional bright red blood on her stool  She denies seeing any blood mixed with her stool  Suspect this could be hemorrhoid related  She had a colonoscopy with TI intubation in 2019 that was completely normal  No evidence of inflammatory bowel disease, polyps, or AVMs  Recent CBC was completely normal  She should monitor her symptoms for now  - Ambulatory referral to Gastroenterology    3  Change in bowel habits  She reports increased frequently of BMs daily and occasional fatty, oily, floating stools  She had evaluation back in 2019 for this problem  Colonoscopy with TI intubation was negative for inflammatory bowel disease  CBC normal  Celiac serologies negative  Stool testing for pancreatic insufficiency negative  Her bowel issues could be related to IBS +/- diabetic enteropathy +/- small intestinal bacterial overgrowth  Since she has tried and failed Bentyl and Imodium, would recommend empiric treatment with Xifaxan for 2 weeks  If we cannot get coverage for Xifaxan, would recommend SIBO breath testing     - Ambulatory referral to Gastroenterology    4  Irritable bowel syndrome with diarrhea    - rifaximin (XIFAXAN) 550 mg tablet; Take 1 tablet (550 mg total) by mouth every 8 (eight) hours for 14 days  Dispense: 42 tablet;  Refill: 2    Follow-up in 3 months  ______________________________________________________________________    SUBJECTIVE:    26-year-old female with multiple medical issues including type 1 insulin dependent diabetes mellitus with hyperglycemia, Hashimoto's disease, low back pain, paresthesias, psychiatric disease referred for evaluation of blood in stool and fatty stools  She saw Dr Carson Myles, Dr Mac Bell, and Dr Belinda Torres in the past for these symptoms and had EGD and colonoscopy which were both apparently normal  I have a colonoscopy per report but cannot view the EGD report  She states she tested negative for H pylori  She has bowel movements 4-5 times per day  She states the stools are generally yellowish in color and formed  She does occasionally have fatty, oily floating stools but this occurs randomly  She occasionally sees bright red blood on her stool but not mixed with the stool  She has chronic right upper quadrant postprandial abdominal pain  The pain starts within 20-30 minutes of eating  She has associated nausea  She does report early satiety as well  She takes Zofran as needed which is helpful  REVIEW OF SYSTEMS IS OTHERWISE NEGATIVE        Historical Information   Past Medical History:   Diagnosis Date    Abdominal pain     Anxiety     Anxiety and depression     COVID-19 12/2020    Depression     Eating disorder     history of anorexia/bulemia 1431-1906    Fracture of fibula     R Salter I   Russell Alpha disease     Hashimoto's disease 2/21/2020    Head injury     Nasal congestion     PTSD (post-traumatic stress disorder)     Rectal bleed     Seizures ([address] )      Past Surgical History:   Procedure Laterality Date    COLONOSCOPY      KNEE SURGERY Right 07/06/2020    NASAL SEPTOPLASTY W/ TURBINOPLASTY      SINUS SURGERY      TURBINOPLASTY N/A 3/22/2021    Procedure: Alonza Belt;  Surgeon: Farhana Yanes MD;  [facility];  Service: ENT    UPPER GASTROINTESTINAL ENDOSCOPY      WISDOM TOOTH EXTRACTION      WRIST SURGERY      left;  Excision of ganglion     Social History   Social History     Substance and Sexual Activity   Alcohol Use Never     Social History     Substance and Sexual Activity   Drug Use Never     Social History     Tobacco Use   Smoking Status Never Smoker   Smokeless Tobacco Never Used   Tobacco Comment    Tobacco smoke exposure (Father smokes cigars)     Family History   Problem Relation Age of Onset    Hypertension Mother    Leticia Trivedi Migraines Mother         Headache    Diabetes type II Mother     Varicose Veins Mother     Hyperlipidemia Mother     Diabetes Mother     Arthritis Mother     Depression Mother     Cholelithiasis Father     Hypertension Father     Sarcoidosis Father         Liver    Hyperlipidemia Father     Diabetes Father     Coronary artery disease Father     Nephrolithiasis Father     Cirrhosis Father     Alcohol abuse Father     Thyroid disease Sister    Leticia Trivedi Hashimoto's thyroiditis Sister     Cancer Family     Diabetes Family     Hypertension Family     Alcohol abuse Brother        Meds/Allergies       Current Outpatient Medications:     ACETONE, URINE, TEST VI    albuterol (PROVENTIL HFA,VENTOLIN HFA) 90 mcg/act inhaler    Altavera 0 15-30 MG-MCG per tablet    ARIPiprazole (ABILIFY) 10 mg tablet    Blood Glucose Monitoring Suppl (ONETOUCH VERIO) w/Device KIT    Cholecalciferol (Vitamin D3) 1 25 MG (10749 UT) CAPS    Cholecalciferol (Vitamin D3) 50 MCG (2000 UT) TABS    clonazePAM (KlonoPIN) 0 5 mg tablet    desvenlafaxine succinate (PRISTIQ) 50 mg 24 hr tablet    divalproex sodium (DEPAKOTE ER) 250 mg 24 hr tablet    divalproex sodium (DEPAKOTE ER) 500 mg 24 hr tablet    ergocalciferol (VITAMIN D2) 12,099 units    folic acid (FOLVITE) 1 mg tablet    glucagon (GLUCAGON EMERGENCY) 1 MG injection    hydroxychloroquine (PLAQUENIL) 200 mg tablet    insulin aspart (NovoLOG) 100 units/mL injection    insulin degludec (TRESIBA) 100 units/mL injection pen    Insulin Infusion Pump KIT    Insulin Syringe-Needle U-100 31G X 5/16" 0 5 ML MISC    Lancets MISC    levothyroxine 25 mcg tablet    magnesium oxide (MAG-OX) 400 mg tablet    magnesium oxide (MAG-OX) 400 mg    Multiple Vitamin (DAILY VALUE MULTIVITAMIN) TABS    ONETOUCH DELICA LANCETS 03H MISC    ONETOUCH VERIO test strip    amitriptyline (ELAVIL) 10 mg tablet    EPINEPHrine (EPIPEN) 0 3 mg/0 3 mL SOAJ    ibuprofen (MOTRIN) 400 mg tablet    Insulin Pen Needle (BD PEN NEEDLE NASIM U/F) 32G X 4 MM MISC    rifaximin (XIFAXAN) 550 mg tablet    Allergies   Allergen Reactions    Iodine - Food Allergy Throat Swelling    Insulin Glargine Other (See Comments)     Burning and redness under skin           Objective     Blood pressure 110/80, pulse (!) 47, temperature (!) 96 9 °F (36 1 °C), temperature source Tympanic, height 5' 2 25" (1 581 m), weight 73 7 kg (162 lb 6 4 oz), not currently breastfeeding  Body mass index is 29 47 kg/m²  PHYSICAL EXAM:      General Appearance:   Alert, cooperative, no distress   HEENT:   Normocephalic, atraumatic, anicteric      Neck:  Supple, symmetrical, trachea midline   Lungs:   Clear to auscultation bilaterally   Heart[de-identified]   Regular rate and rhythm   Abdomen:   Non-distended  Normal bowel sounds  Soft  Mild generalized tenderness to palpation  No rebound or guarding     Genitalia:   Deferred    Rectal:   Deferred    Extremities:  No cyanosis, clubbing or edema    Pulses:  2+ and symmetric    Skin:  No jaundice, rashes, or lesions    Lymph nodes:  No palpable cervical lymphadenopathy        Lab Results:   No visits with results within 1 Day(s) from this visit     Latest known visit with results is:   Appointment on 07/16/2021   Component Date Value    C-Peptide 07/16/2021 0 4*    Glutaminc Acid Decarboxy* 07/16/2021 117 2*    Hemoglobin A1C 07/16/2021 7 8*    EAG 07/16/2021 177     Insulin, Fasting 07/16/2021 106 1*    Islet Cell Ab 07/16/2021 Negative  Sodium 07/16/2021 138     Potassium 07/16/2021 4 4     Chloride 07/16/2021 103     CO2 07/16/2021 21     ANION GAP 07/16/2021 14*    BUN 07/16/2021 18     Creatinine 07/16/2021 0 92     Glucose, Fasting 07/16/2021 175*    Calcium 07/16/2021 9 1     eGFR 07/16/2021 87     THYROID MICROSOMAL ANTIB* 07/16/2021 57*    TSH 3RD GENERATON 07/16/2021 0 677     Thyroglobulin Ab 07/16/2021 4 6*    TSI 07/16/2021 0 15     Free T4 07/16/2021 1 10     FSH 07/16/2021 0 9     LH 07/16/2021 0 3     Testosterone 07/16/2021 8 0     Cortisol, Random 07/16/2021 2 3*    ACTH 07/16/2021 <1 5*    THYROGLOBULIN (TG-BEVERLY) 07/16/2021 6 9          Radiology Results:   XR ankle 3+ vw right    Result Date: 7/9/2021  Narrative: RIGHT ANKLE INDICATION:   left ankle pain; no midfoot tenderness  COMPARISON:  None VIEWS:  XR ANKLE 3+ VW RIGHT Images: 3 FINDINGS: There is no acute fracture or dislocation  No significant degenerative changes  No lytic or blastic osseous lesion  Soft tissues are unremarkable  Impression: No acute osseous abnormality   Workstation performed: [id number]     Answers for HPI/ROS submitted by the patient on 8/3/2021  Chronicity: chronic  Onset: more than 1 year ago  Onset quality: undetermined  Frequency: constantly  Progression since onset: waxing and waning  Pain location: RUQ  Pain - numeric: 2/10  Pain quality: cramping, sharp  Radiates to: RLQ  anorexia: No  arthralgias: Yes  belching: No  constipation: No  diarrhea: No  dysuria: No  fever: No  flatus: No  frequency: No  headaches: No  hematochezia: Yes  hematuria: No  melena: No  myalgias: Yes  nausea: Yes  weight loss: No  vomiting: No  Aggravated by: nothing  Relieved by: nothing

## 2021-08-05 ENCOUNTER — TELEPHONE (OUTPATIENT)
Dept: OBGYN CLINIC | Facility: CLINIC | Age: 26
End: 2021-08-05

## 2021-08-05 ENCOUNTER — TELEPHONE (OUTPATIENT)
Dept: PSYCHIATRY | Facility: CLINIC | Age: 26
End: 2021-08-05

## 2021-08-05 ENCOUNTER — TELEPHONE (OUTPATIENT)
Dept: GASTROENTEROLOGY | Facility: CLINIC | Age: 26
End: 2021-08-05

## 2021-08-05 NOTE — TELEPHONE ENCOUNTER
----- Message from Watkins Power sent at 8/4/2021  6:36 PM EDT -----  Regarding: Visit Follow-Up Question  Contact: [phone]  Hello again,  I have yet to hear from the dermatologist regarding the referral that you gave me  Also, I forgot to mention at my visit that sometimes when I urinate, it burns internally     Sorry to bother you,  Lakesha Barrera

## 2021-08-05 NOTE — TELEPHONE ENCOUNTER
Sent medical records request to Emili at [facility] for signature of Adarsh Monreal  Requested that Emili send MR request for Eric Cueto to [facility] after authorized

## 2021-08-05 NOTE — TELEPHONE ENCOUNTER
Per comm consent lm to pt that I see the referral but no specific name so she can probably call who she would like     Also asked her to cb to discuss other sx she is having

## 2021-08-05 NOTE — TELEPHONE ENCOUNTER
Patients GI provider: Dr Beatrice Guardado    Number to return call: NA    Reason for call: Pharmacy called stating the Marilu Anna requires a prior auth     Scheduled procedure/appointment date if applicable: Appt - 70/93/71

## 2021-08-06 ENCOUNTER — TELEPHONE (OUTPATIENT)
Dept: OBGYN CLINIC | Facility: CLINIC | Age: 26
End: 2021-08-06

## 2021-08-06 ENCOUNTER — OFFICE VISIT (OUTPATIENT)
Dept: PHYSICAL THERAPY | Facility: CLINIC | Age: 26
End: 2021-08-06
Payer: COMMERCIAL

## 2021-08-06 DIAGNOSIS — M54.50 LEFT LOW BACK PAIN, UNSPECIFIED CHRONICITY, UNSPECIFIED WHETHER SCIATICA PRESENT: ICD-10-CM

## 2021-08-06 DIAGNOSIS — R20.2 PARESTHESIA OF LEFT LEG: Primary | ICD-10-CM

## 2021-08-06 LAB
LAB AP GYN PRIMARY INTERPRETATION: NORMAL
Lab: NORMAL

## 2021-08-06 PROCEDURE — 97112 NEUROMUSCULAR REEDUCATION: CPT | Performed by: PHYSICAL THERAPIST

## 2021-08-06 PROCEDURE — 97140 MANUAL THERAPY 1/> REGIONS: CPT | Performed by: PHYSICAL THERAPIST

## 2021-08-06 PROCEDURE — 97110 THERAPEUTIC EXERCISES: CPT | Performed by: PHYSICAL THERAPIST

## 2021-08-06 NOTE — PROGRESS NOTES
Daily Note     Today's date: 2021  Patient name: Etta Morris  : 1995  MRN: [id number]  Referring provider: MARGARET Rea  Dx:   Encounter Diagnosis     ICD-10-CM    1  Paresthesia of left leg  R20 2    2  Left low back pain, unspecified chronicity, unspecified whether sciatica present  M54 5                   Subjective: Patient is still getting numbness down leg  She states last time she did an exercise last time where she was in pain the whole rest of the day  She has not been sleeping well lately due to having to go shopping  Last night she slept 12 5 hours  Objective: See treatment diary below    Assessment: Tolerated treatment well overall as manuals were s pain  Pain science education included discussing nociception vs central sensitization  She finds central sensitization (not having enough ability for brain to decrease nerve sensation) is more of her issue  She is able to manage with reducing her triggers and her stimuli, but she was not so sure how to help her brain health  She finds being with her dog gives her more energy  She did have fatigue after 4 mins of the bike and will progress prn  Patient would benefit from continued PT  Continue to progress as able  Plan: Continue per plan of care        Goals: Patient's goal is To feel better and do more- walking and wants to know what is possible    Precautions: Fatigue Depression, Bipolar Disorder,  Falls- muscles stiffen up and she falls, seizures, DM 1,   Dx: L leg numbness and L psoas tightness decreased hip IR hypomobility  At 90 deg no numbness    Daily Treatment Diary     Manuals 2021 7/15 7/19 8/6     L psoas and tfl STM    8'     L hip PROM prn                           Ther Ex         bike  Declined due to ankle pain np 7' pain at 4 min     LTR  2x10 2x10 10x     Pain science ed  8'  8'              Hip ir stool when yolis         Hip abd   Stand 10x ea 10x     Hip ext    10x        Hip add iso 3x5x5" Pain lv Neuro Re-ed   7/19      Towel scrunches  30x 30x      TA contraction  20x  20x5" 10x     TA c RTB press   RTB 10x ea nv     TA c march  nv 3x5 ea 3x5     Glut sets   10x5" Pain last visit        PPT 10x5"      Ther Activity                           Gait Training                           Modalities

## 2021-08-09 ENCOUNTER — TELEPHONE (OUTPATIENT)
Dept: PSYCHIATRY | Facility: CLINIC | Age: 26
End: 2021-08-09

## 2021-08-09 ENCOUNTER — TELEPHONE (OUTPATIENT)
Dept: GASTROENTEROLOGY | Facility: AMBULARY SURGERY CENTER | Age: 26
End: 2021-08-09

## 2021-08-09 NOTE — TELEPHONE ENCOUNTER
We prescribed xifaxan for IBS-D however the patient has not tried and failed those other alternatives prior  I recommend we provide her samples from the office

## 2021-08-09 NOTE — TELEPHONE ENCOUNTER
Received denial from [employer] for [place]  Can a letter be done as to why this pt needs this medication  The denial says pt must have tried and failed lactos, gluten, and artificial sweetener avoidence, failed a fodmap diet, and failed loperamide and bile acid sequestrant  Please advise  Thank you!

## 2021-08-09 NOTE — TELEPHONE ENCOUNTER
<48-HOUR NOTICE: Patient contacted office and spoke with writer to cancel therapy appointment on 8/11/2021 at 3:00pm  Provided less than 48-hour notice  Reason: Has a school appt at the same time that she has to attend  Cancelled appointment out of provider's schedule  , Please notify me high priority if you would like this appointment added back into schedule to heraclio them as No Show (Late Cancellation)

## 2021-08-16 ENCOUNTER — TELEPHONE (OUTPATIENT)
Dept: GASTROENTEROLOGY | Facility: AMBULARY SURGERY CENTER | Age: 26
End: 2021-08-16

## 2021-08-16 ENCOUNTER — APPOINTMENT (OUTPATIENT)
Dept: PHYSICAL THERAPY | Facility: CLINIC | Age: 26
End: 2021-08-16
Payer: COMMERCIAL

## 2021-08-16 DIAGNOSIS — K58.0 IRRITABLE BOWEL SYNDROME WITH DIARRHEA: Primary | ICD-10-CM

## 2021-08-16 NOTE — TELEPHONE ENCOUNTER
Spoke with [facility] Marce Robbins was denied  A peer to peer can be done  The number is [phone]  Please advise  Thank you!  Pt's ID # [id number]

## 2021-08-17 NOTE — TELEPHONE ENCOUNTER
Sent home [facility] for approval -This is a specialty pharmacy that will help get med approved that are not covered by insurance  Lets see if they can get her medication approved 1st then if it is not approved then we can do a peer to peer    Thank you

## 2021-08-18 ENCOUNTER — TELEPHONE (OUTPATIENT)
Dept: NEUROLOGY | Facility: CLINIC | Age: 26
End: 2021-08-18

## 2021-08-18 ENCOUNTER — APPOINTMENT (OUTPATIENT)
Dept: PHYSICAL THERAPY | Facility: CLINIC | Age: 26
End: 2021-08-18
Payer: COMMERCIAL

## 2021-08-18 ENCOUNTER — SOCIAL WORK (OUTPATIENT)
Dept: BEHAVIORAL/MENTAL HEALTH CLINIC | Facility: CLINIC | Age: 26
End: 2021-08-18
Payer: COMMERCIAL

## 2021-08-18 DIAGNOSIS — F43.12 CHRONIC POST-TRAUMATIC STRESS DISORDER (PTSD): ICD-10-CM

## 2021-08-18 DIAGNOSIS — F09 COGNITIVE DYSFUNCTION: Primary | ICD-10-CM

## 2021-08-18 DIAGNOSIS — F41.0 GENERALIZED ANXIETY DISORDER WITH PANIC ATTACKS: ICD-10-CM

## 2021-08-18 DIAGNOSIS — R41.0 CONFUSION: ICD-10-CM

## 2021-08-18 DIAGNOSIS — F41.1 GENERALIZED ANXIETY DISORDER WITH PANIC ATTACKS: ICD-10-CM

## 2021-08-18 DIAGNOSIS — E06.3 HASHIMOTO'S DISEASE: ICD-10-CM

## 2021-08-18 DIAGNOSIS — F42.2 MIXED OBSESSIONAL THOUGHTS AND ACTS: Primary | ICD-10-CM

## 2021-08-18 PROCEDURE — 90834 PSYTX W PT 45 MINUTES: CPT | Performed by: SOCIAL WORKER

## 2021-08-18 NOTE — TELEPHONE ENCOUNTER
Received call from [address] [place] [address] in [place] [address]  She is requesting a new referral be placed as 5/4 referral to Dr Prisca Devi has been closed  Per Ramy Escobar, Dr Prisca Devi is no longer doing this testing  [facility] can perform this testing; however, diagnosis codes cannot be "F" codes (behavioral health codes)  R41 0 Confusion would be acceptable  Nursing - please call [facility] when referral is placed  Dr Samy Moore - please enter new referral if agreeable

## 2021-08-18 NOTE — PSYCH
Psychotherapy Provided: Individual Psychotherapy 50 minutes     Length of time in session: 50 minutes, follow up in 1 week    Encounter Diagnosis     ICD-10-CM    1  Mixed obsessional thoughts and acts  F42 2    2  Generalized anxiety disorder with panic attacks  F41 1     F41 0    3  Chronic post-traumatic stress disorder (PTSD)  F43 12        Goals addressed in session: Goal 1, Goal 2 and Goal 3      Pain:      none    0    Current suicide risk : Low     D: Met with Jenny individually  'I have been shut down for last few days '  Discussed change - moving apartments and in with sister, Emily Franks leaving for [place] for several months  'I don't do well with change'  Ortizherman Gonzalez expressed frustration with Neuropsychology and insurance coverage  Therapist called Neuro with Ortizherman Gonzalez here to review required codes for her to have testing done at [address] [place] [address]  Neuro responded and will modify a new referral- then call therapist when in [address] [place] [address] Rd  Further discussion of medical obstacles, recent Dr  Visits and future ones  A: Ortizherman Gonzalez presented flat and unfocused  Discussed many obstacles - overwhelmed- wants an answer    P: Continue individual therapy    [address]: Diagnosis and Treatment Plan explained to Ressie Space relates understanding diagnosis and is agreeable to Treatment Plan   Yes

## 2021-08-18 NOTE — TELEPHONE ENCOUNTER
Spoke with pt, updated her on status of prior auth  I will keep her updated  Advised to call office with any questions or concerns

## 2021-08-18 NOTE — TELEPHONE ENCOUNTER
Spoke with Pedro does need a prior auth and they are currently working on it  Advised if they need additional information they will reach out to the office

## 2021-08-19 NOTE — TELEPHONE ENCOUNTER
New referral still contains Dx codes ("F" codes) we are unable to bill for   Cancelled referral, will wait for new referral to be placed to reach out to patient

## 2021-08-23 ENCOUNTER — APPOINTMENT (OUTPATIENT)
Dept: PHYSICAL THERAPY | Facility: CLINIC | Age: 26
End: 2021-08-23
Payer: COMMERCIAL

## 2021-08-23 NOTE — PROGRESS NOTES
Daily Note     Today's date: 2021  Patient name: Fco Ty  : 1995  MRN: [id number]  Referring provider: Dannial Fleischer, CRNP  Dx:   No diagnosis found  Subjective: Patient is still getting numbness down leg  She states last time she did an exercise last time where she was in pain the whole rest of the day  She has not been sleeping well lately due to having to go shopping  Last night she slept 12 5 hours  Objective: See treatment diary below    Assessment: Tolerated treatment well overall as manuals were s pain  Pain science education included discussing nociception vs central sensitization  She finds central sensitization (not having enough ability for brain to decrease nerve sensation) is more of her issue  She is able to manage with reducing her triggers and her stimuli, but she was not so sure how to help her brain health  She finds being with her dog gives her more energy  She did have fatigue after 4 mins of the bike and will progress prn  Patient would benefit from continued PT  Continue to progress as able  Plan: Continue per plan of care        Goals: Patient's goal is To feel better and do more- walking and wants to know what is possible    Precautions: Fatigue Depression, Bipolar Disorder,  Falls- muscles stiffen up and she falls, seizures, DM 1,   Dx: L leg numbness and L psoas tightness decreased hip IR hypomobility  At 90 deg no numbness    Daily Treatment Diary     Manuals 2021 7/15 7/19 8/6     L psoas and tfl STM    8'     L hip PROM prn                           Ther Ex         bike  Declined due to ankle pain np 7' pain at 4 min     LTR  2x10 2x10 10x     Pain science ed  8'  8'              Hip ir stool when yolis         Hip abd   Stand 10x ea 10x     Hip ext    10x        Hip add iso 3x5x5" Pain lv              Neuro Re-ed         Towel scrunches  30x 30x      TA contraction  20x  20x5" 10x     TA c RTB press   RTB 10x ea nv     TA c march nv 3x5 ea 3x5     Glut sets   10x5" Pain last visit        PPT 10x5"      Ther Activity                           Gait Training                           Modalities

## 2021-08-24 NOTE — TELEPHONE ENCOUNTER
New referral without "F codes" pended below  Used diagnosis code R41 0-Confusion      Dr Horton Sensor - please sign if agreeable

## 2021-08-25 ENCOUNTER — SOCIAL WORK (OUTPATIENT)
Dept: BEHAVIORAL/MENTAL HEALTH CLINIC | Facility: CLINIC | Age: 26
End: 2021-08-25
Payer: COMMERCIAL

## 2021-08-25 ENCOUNTER — APPOINTMENT (OUTPATIENT)
Dept: PHYSICAL THERAPY | Facility: CLINIC | Age: 26
End: 2021-08-25
Payer: COMMERCIAL

## 2021-08-25 DIAGNOSIS — F41.0 GENERALIZED ANXIETY DISORDER WITH PANIC ATTACKS: ICD-10-CM

## 2021-08-25 DIAGNOSIS — F41.1 GENERALIZED ANXIETY DISORDER WITH PANIC ATTACKS: ICD-10-CM

## 2021-08-25 DIAGNOSIS — F43.12 CHRONIC POST-TRAUMATIC STRESS DISORDER (PTSD): ICD-10-CM

## 2021-08-25 DIAGNOSIS — F42.2 MIXED OBSESSIONAL THOUGHTS AND ACTS: Primary | ICD-10-CM

## 2021-08-25 PROCEDURE — 90834 PSYTX W PT 45 MINUTES: CPT | Performed by: SOCIAL WORKER

## 2021-08-25 NOTE — PSYCH
Psychotherapy Provided: Individual Psychotherapy 50 minutes     Length of time in session: 50 minutes, follow up in 1 week    Encounter Diagnosis     ICD-10-CM    1  Mixed obsessional thoughts and acts  F42 2    2  Generalized anxiety disorder with panic attacks  F41 1     F41 0    3  Chronic post-traumatic stress disorder (PTSD)  F43 12        Goals addressed in session: Goal 1, Goal 2 and Goal 3      Pain:      none    5    Current suicide risk : Low     D: Met with Jenny moses  Supportive therapy provided due to 2 recent ER visits and ongoing medical complications  Tad Porras Friday night for Mona Hyde will move to a new apartment with sister beginning of October Denied SI    A: Jennifer Hyde presented fatigued, overwhelmed and discouraged due to ongoing medical complications  Several situational stressors this past week triggered blood sugar and gallbladder obstacles  P: Continue individual therapy    Behavioral Health Treatment Plan [facility]: Diagnosis and Treatment Plan explained to Sarah Fuentes relates understanding diagnosis and is agreeable to Treatment Plan   Yes
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
## 2021-08-26 ENCOUNTER — HOSPITAL ENCOUNTER (OUTPATIENT)
Dept: RADIOLOGY | Facility: HOSPITAL | Age: 26
Discharge: HOME | End: 2021-08-26
Payer: COMMERCIAL

## 2021-08-26 DIAGNOSIS — R10.11 RIGHT UPPER QUADRANT ABDOMINAL PAIN: ICD-10-CM

## 2021-08-26 PROCEDURE — G1004 CDSM NDSC: HCPCS

## 2021-08-26 PROCEDURE — 78227 HEPATOBIL SYST IMAGE W/DRUG: CPT

## 2021-08-26 PROCEDURE — A9537 TC99M MEBROFENIN: HCPCS

## 2021-08-26 RX ORDER — CHOLESTYRAMINE 4 G/9G
1 POWDER, FOR SUSPENSION ORAL DAILY
Qty: 30 PACKET | Refills: 1 | Status: SHIPPED | OUTPATIENT
Start: 2021-08-26 | End: 2021-10-26

## 2021-08-26 RX ADMIN — WATER 1.4 ML: 1 INJECTION INTRAMUSCULAR; INTRAVENOUS; SUBCUTANEOUS at 14:39

## 2021-08-26 RX ADMIN — SINCALIDE 1.4 MCG: 5 INJECTION, POWDER, LYOPHILIZED, FOR SOLUTION INTRAVENOUS at 14:30

## 2021-08-26 NOTE — TELEPHONE ENCOUNTER
Peer to peer done and they denied Xifaxan  Spoke to patient, she has tried and failed Low FODMAP diet, lactose free, and gluten free diet, but she has not tried a bile acid sequestrant  Questran 4g daily ordered for patient to try  If she fails this, then can likely get Xifaxan covered  She will follow up with Dr Damon Campbell as scheduled  Milagro, is there SIBO breath testing available at your office? If so, patient should have this done

## 2021-08-30 ENCOUNTER — APPOINTMENT (OUTPATIENT)
Dept: PHYSICAL THERAPY | Facility: CLINIC | Age: 26
End: 2021-08-30
Payer: COMMERCIAL

## 2021-08-31 ENCOUNTER — TRANSITIONAL CARE MANAGEMENT (OUTPATIENT)
Dept: INTERNAL MEDICINE CLINIC | Facility: CLINIC | Age: 26
End: 2021-08-31

## 2021-09-01 ENCOUNTER — OFFICE VISIT (OUTPATIENT)
Dept: INTERNAL MEDICINE CLINIC | Facility: CLINIC | Age: 26
End: 2021-09-01
Payer: COMMERCIAL

## 2021-09-01 ENCOUNTER — SOCIAL WORK (OUTPATIENT)
Dept: BEHAVIORAL/MENTAL HEALTH CLINIC | Facility: CLINIC | Age: 26
End: 2021-09-01
Payer: COMMERCIAL

## 2021-09-01 VITALS
WEIGHT: 153 LBS | RESPIRATION RATE: 16 BRPM | SYSTOLIC BLOOD PRESSURE: 112 MMHG | HEIGHT: 62 IN | DIASTOLIC BLOOD PRESSURE: 70 MMHG | HEART RATE: 68 BPM | OXYGEN SATURATION: 99 % | BODY MASS INDEX: 28.16 KG/M2 | TEMPERATURE: 97.8 F

## 2021-09-01 DIAGNOSIS — Z00.00 HEALTH CARE MAINTENANCE: ICD-10-CM

## 2021-09-01 DIAGNOSIS — F31.63 BIPOLAR DISORDER, CURRENT EPISODE MIXED, SEVERE, WITHOUT PSYCHOTIC FEATURES (HCC): ICD-10-CM

## 2021-09-01 DIAGNOSIS — L60.9 NAIL ABNORMALITY: ICD-10-CM

## 2021-09-01 DIAGNOSIS — F43.12 CHRONIC POST-TRAUMATIC STRESS DISORDER (PTSD): ICD-10-CM

## 2021-09-01 DIAGNOSIS — F41.1 GENERALIZED ANXIETY DISORDER WITH PANIC ATTACKS: ICD-10-CM

## 2021-09-01 DIAGNOSIS — F42.2 MIXED OBSESSIONAL THOUGHTS AND ACTS: Primary | ICD-10-CM

## 2021-09-01 DIAGNOSIS — E10.10 DIABETIC KETOACIDOSIS WITHOUT COMA ASSOCIATED WITH TYPE 1 DIABETES MELLITUS (HCC): ICD-10-CM

## 2021-09-01 DIAGNOSIS — E10.65 TYPE 1 DIABETES MELLITUS WITH HYPERGLYCEMIA (HCC): Primary | ICD-10-CM

## 2021-09-01 DIAGNOSIS — F41.0 GENERALIZED ANXIETY DISORDER WITH PANIC ATTACKS: ICD-10-CM

## 2021-09-01 PROCEDURE — 99495 TRANSJ CARE MGMT MOD F2F 14D: CPT | Performed by: NURSE PRACTITIONER

## 2021-09-01 PROCEDURE — 90834 PSYTX W PT 45 MINUTES: CPT | Performed by: SOCIAL WORKER

## 2021-09-01 RX ORDER — AMOXICILLIN AND CLAVULANATE POTASSIUM 875; 125 MG/1; MG/1
1 TABLET, FILM COATED ORAL 2 TIMES DAILY
COMMUNITY
Start: 2021-08-28 | End: 2021-09-04

## 2021-09-01 NOTE — ASSESSMENT & PLAN NOTE
Mood is not stable at present on her current medications  She has been feeling depressed and stressed and has had some breakdowns  She is currently living alone, her sister comes on and off to help her  She expects her boyfriend to come back from [place] early next year  She is compliant with her psychiatric medications, does not feel they help  She has discussed with her psychiatrist and plan is to continue with ongoing monitoring and counseling  She denies SI  Continue with treatment and fu

## 2021-09-01 NOTE — ASSESSMENT & PLAN NOTE
Pt managed by endocrinology at   Reviewed documentation from recent hospitalization for DKA  She is now back on her insulin pump with her previous ratios and dosing  Sugars have been labile, at times >300  She is scheduled to f/u with endocrinology on 9/10  Would like to help pt get a life alert since she is now living alone and at times sugar goes very low  Referral sent to case management to see if they can help her with the resources      Lab Results   Component Value Date    HGBA1C 8 4 (H) 08/21/2021

## 2021-09-01 NOTE — PROGRESS NOTES
Assessment/Plan:    Type 1 diabetes mellitus with hyperglycemia (HCC)  Pt managed by endocrinology at   Reviewed documentation from recent hospitalization for DKA  She is now back on her insulin pump with her previous ratios and dosing  Sugars have been labile, at times >300  She is scheduled to f/u with endocrinology on 9/10  Would like to help pt get a life alert since she is now living alone and at times sugar goes very low  Referral sent to case management to see if they can help her with the resources  Lab Results   Component Value Date    HGBA1C 8 4 (H) 08/21/2021       Hashimoto's disease  TSH and t4 normal on labs from July  Continue with current dosing of levothyroxine as per endocrinology  Ongoing f/u and monitoring  DKA (diabetic ketoacidoses) [facility])  Reviewed documentation from recent hospitalization for DKA  Stabilized on insulin drip, d/c'd and now back on on her insulin pump  Denies n/v, tolerating PO  F/u as schedule with endocrinology next week  Lab Results   Component Value Date    HGBA1C 8 4 (H) 08/21/2021       Bipolar affective disorder (HCC)  Mood is not stable at present on her current medications  She has been feeling depressed and stressed and has had some breakdowns  She is currently living alone, her sister comes on and off to help her  She expects her boyfriend to come back from [place] early next year  She is compliant with her psychiatric medications, does not feel they help  She has discussed with her psychiatrist and plan is to continue with ongoing monitoring and counseling  She denies SI  Continue with treatment and fu  Nail abnormality  Pt reports loosening of her toenails from the nail bed  Unable to assess due to black nail polish  She is referred to podiatry for further evaluation           Diagnoses and all orders for this visit:    Type 1 diabetes mellitus with hyperglycemia ([facility] [address] )  -     Ambulatory referral to social work care management program; Future  -     Ambulatory referral to Podiatry; Future    Health care maintenance  -     HIV 1/2 Antigen/Antibody (4th Generation) w Reflex [facility]; Future    Nail abnormality  -     Ambulatory referral to Podiatry; Future    Diabetic ketoacidosis without coma associated with type 1 diabetes mellitus ([facility] [address] )    Bipolar disorder, current episode mixed, severe, without psychotic features [facility] )    Other orders  -     amoxicillin-clavulanate (AUGMENTIN) 875-125 mg per tablet; Take 1 tablet by mouth 2 (two) times a day          Subjective:      Patient ID: Jett Rapp is a 27 y o  female  Pt is a 27y o  year old female who is here for TCM visit following hospitalization at [address] [place] [address] [address] from 8/21-8/24 with discharge to Home   Admission diagnosis was DKA  PMH of DM I, depression, anxiety, PTSD, hashimoto's  Presented to ER with hyperglycemia, abdominal pain, and vomiting, as weel as confusion  She reported to ER that she d/c'd her insulin pump the week prior because she was having a "psychotic break "  She was using sq insulin, reporting decrease of dosing from 38 to 34 units  She notes that she was started on plaquenil in July for a mixed connective tissue disorder and since then her sugars have been more labile; so at her last visit with her endocrinology reduced her dose  She was treated on an insulin drip and d/c'd after her sugars stabilized and tolerating PO  Since d/c she is back on her insulin pump  She states sugars are still spiking  She was started on augmentin for a cat scratch  She has been eating about 2 meals per day  Still feels that her mental state is poor; she is under a lot of stress since her boyfriend went back to [place]  Her sister is staying with her on and off  She denies SI, but feels very depressed  She follows with her psychiatrist closely, no recent changes in her medication  Discharge medications reviewed yes        The following portions of the patient's history were reviewed and updated as appropriate: allergies, current medications, past family history, past medical history, past social history, past surgical history and problem list     Review of Systems   Constitutional: Positive for appetite change and fatigue  Negative for activity change, chills, diaphoresis, fever and unexpected weight change  HENT: Negative for hearing loss  Eyes: Negative for visual disturbance  Respiratory: Negative for cough, chest tightness and shortness of breath  Cardiovascular: Negative for chest pain, palpitations and leg swelling  Gastrointestinal: Negative for diarrhea, nausea and vomiting  Genitourinary: Positive for menstrual problem  Negative for dysuria and frequency  Musculoskeletal: Negative for arthralgias and myalgias  Allergic/Immunologic: Negative for environmental allergies  Neurological: Positive for speech difficulty and weakness  Negative for dizziness, numbness and headaches  Psychiatric/Behavioral: Positive for dysphoric mood  Negative for agitation and sleep disturbance  The patient is nervous/anxious  Objective:      /70   Pulse 68   Temp 97 8 °F (36 6 °C) (Skin)   Resp 16   Ht 5' 2 25" (1 581 m)   Wt 69 4 kg (153 lb)   SpO2 99%   BMI 27 76 kg/m²          Physical Exam  Vitals reviewed  Constitutional:       General: She is awake  She is not in acute distress  Appearance: Normal appearance  She is well-developed and well-groomed  She is not ill-appearing  HENT:      Head: Normocephalic  Right Ear: Hearing and external ear normal       Left Ear: Hearing and external ear normal       Nose: Nose normal       Mouth/Throat:      Lips: Pink  Pharynx: Oropharynx is clear  Eyes:      General: Lids are normal       Conjunctiva/sclera: Conjunctivae normal    Cardiovascular:      Rate and Rhythm: Normal rate and regular rhythm  Pulses: Normal pulses  Pulmonary:      Effort: Pulmonary effort is normal  No respiratory distress  Abdominal:      General: Abdomen is flat  Palpations: Abdomen is soft  Musculoskeletal:         General: Normal range of motion  Cervical back: Normal range of motion  Right lower leg: No edema  Left lower leg: No edema  Skin:     General: Skin is warm and dry  Neurological:      Mental Status: She is alert and oriented to person, place, and time  Psychiatric:         Attention and Perception: Attention normal          Mood and Affect: Mood is depressed  Speech: Speech normal          Behavior: Behavior normal  Behavior is cooperative  Thought Content: Thought content normal          Cognition and Memory: Cognition normal          Judgment: Judgment normal          TCM Call (since 8/1/2021)     Date and time call was made  8/25/2021  1:29 PM    Hospital care reviewed  Records reviewed    Patient was hospitialized at  [facility]        Date of Admission  08/21/21    Date of discharge  08/24/21    Diagnosis  Diabetic Ketoacidosis    Disposition  Home    Current Symptoms  None      TCM Call (since 8/1/2021)     Post hospital issues  None    Scheduled for follow up?   Yes    I have advised the patient to call PCP with any new or worsening symptoms  Nova Valentin CMA    Counseling  Patient    Comments  Patient appointment scheduled for 9/1/21

## 2021-09-01 NOTE — ASSESSMENT & PLAN NOTE
TSH and t4 normal on labs from July  Continue with current dosing of levothyroxine as per endocrinology  Ongoing f/u and monitoring

## 2021-09-01 NOTE — ASSESSMENT & PLAN NOTE
Pt reports loosening of her toenails from the nail bed  Unable to assess due to black nail polish  She is referred to podiatry for further evaluation

## 2021-09-01 NOTE — ASSESSMENT & PLAN NOTE
Reviewed documentation from recent hospitalization for DKA  Stabilized on insulin drip, d/c'd and now back on on her insulin pump  Denies n/v, tolerating PO  F/u as schedule with endocrinology next week      Lab Results   Component Value Date    HGBA1C 8 4 (H) 08/21/2021

## 2021-09-03 ENCOUNTER — PATIENT OUTREACH (OUTPATIENT)
Dept: INTERNAL MEDICINE CLINIC | Facility: CLINIC | Age: 26
End: 2021-09-03

## 2021-09-03 DIAGNOSIS — E10.65 TYPE 1 DIABETES MELLITUS WITH HYPERGLYCEMIA (HCC): Primary | ICD-10-CM

## 2021-09-03 NOTE — PROGRESS NOTES
Referral received from Amarjit Mitchell with request for [facility] to outreach patient and assist with need for life alert system  Patient followed by  Endocrinology  Patient has low blood sugars and lives alone; would like life alert due to concern of low sugars  Patient currently on insulin pump  Patient shared with PCP that she has been feeling depressed and stressed; her sisters helps her at times and her boyfriend is placed to return from [place] early next year  Patient is established with psychiatry  Spoke with patient who stated she has concerns with living alone due to low blood sugars and had recent scare when she could not get sugars to go back up  Requesting Life Alert information; will email to patient per her request   Patient also has muscle contractions at times and stated she sometimes has issues walking; is able to drive if needed but more often has others drive her to appts  Patient is able to complete ADLs on her own but takes breaks as needed  Patient requested handicap placard  Will route to [address] for assistance with completing handicap placard if appropriate  Patient also agreed to speak with [facility] regarding diabetes management  Referral placed for [facility]

## 2021-09-08 ENCOUNTER — SOCIAL WORK (OUTPATIENT)
Dept: BEHAVIORAL/MENTAL HEALTH CLINIC | Facility: CLINIC | Age: 26
End: 2021-09-08
Payer: COMMERCIAL

## 2021-09-08 ENCOUNTER — PATIENT OUTREACH (OUTPATIENT)
Dept: INTERNAL MEDICINE CLINIC | Facility: CLINIC | Age: 26
End: 2021-09-08

## 2021-09-08 DIAGNOSIS — F41.1 GENERALIZED ANXIETY DISORDER WITH PANIC ATTACKS: ICD-10-CM

## 2021-09-08 DIAGNOSIS — F41.0 GENERALIZED ANXIETY DISORDER WITH PANIC ATTACKS: ICD-10-CM

## 2021-09-08 DIAGNOSIS — F42.2 MIXED OBSESSIONAL THOUGHTS AND ACTS: Primary | ICD-10-CM

## 2021-09-08 DIAGNOSIS — F43.12 CHRONIC POST-TRAUMATIC STRESS DISORDER (PTSD): ICD-10-CM

## 2021-09-08 PROCEDURE — 90834 PSYTX W PT 45 MINUTES: CPT | Performed by: SOCIAL WORKER

## 2021-09-08 NOTE — PSYCH
Psychotherapy Provided: Individual Psychotherapy 50 minutes     Length of time in session: 50 minutes, follow up in 1 week    Encounter Diagnosis     ICD-10-CM    1  Mixed obsessional thoughts and acts  F42 2    2  Generalized anxiety disorder with panic attacks  F41 1     F41 0    3  Chronic post-traumatic stress disorder (PTSD)  F43 12        Goals addressed in session: Goal 1, Goal 2 and Goal 3      Pain:      moderate to severe    3    Current suicide risk : Low     D: Met with Jenny individually  "I don't feel to well today '  Feels dizzy and 'just weird '  Wearing insulin pump and addressing carb counting  Discussed completing school work on campus to be around people  Also walked around club expo  Signed up for International club 'Ill see'  Worried living with sister won't work - will move in regardless  Worried as dad will visit - trigger  Mom spending a lot more time with Abby Lan  Feels resuming 'an eating disorder' as this was a historic dx  Through further exploration this doesn't seem the case - will continue to monitor  Denied SI    A: Abby Lan presented more stoic today  While addressing progress, Abby Lan quickly resumed the negative  When alerted to this, Abby Lan mentioned 'always being a Priscilla downer '    P: Continue individual therapy    [address]: Diagnosis and Treatment Plan explained to Ayanna List relates understanding diagnosis and is agreeable to Treatment Plan   Yes

## 2021-09-08 NOTE — PROGRESS NOTES
Introduced myself to Steph Barton  She explained on more than one occasion her continuous glucose monitor did not alert her that blood sugar was dropping  The information sent by Tahmina alegria is too costly for her right now  I will check with endocrinology to see if there are any other options and get back to her   She did report her issue to [facility] and they said sometimes the markus needs an update

## 2021-09-08 NOTE — PROGRESS NOTES
Spoke with Jose Maria Baker but it was not a good time to talk she is due to speak with a professor in 10 minutes  I will try calling her back later today or tomorrow

## 2021-09-09 ENCOUNTER — PATIENT OUTREACH (OUTPATIENT)
Dept: INTERNAL MEDICINE CLINIC | Facility: CLINIC | Age: 26
End: 2021-09-09

## 2021-09-09 NOTE — PROGRESS NOTES
Spoke with [facility] endocrinology and explained patients concerns over low blood sugars and Dexcom not alerting her  They will reach out to patient and talk to her

## 2021-09-10 ENCOUNTER — PATIENT OUTREACH (OUTPATIENT)
Dept: INTERNAL MEDICINE CLINIC | Facility: CLINIC | Age: 26
End: 2021-09-10

## 2021-09-10 NOTE — PROGRESS NOTES
Outreached patient to check status and follow-up on Life alert resources provided to patient via mail  [facility] RN Romelia Magana spoke with patient yesterday  [facility] endocrinology office is to reach out to patient regarding her dexcom  There are no other systems to alert patient for low blood sugar  Spoke with patient who confirmed she has appt with [facility] Endo today to discuss dexcom issues  Dexcom informed patient she may need to update markus as notifications of abnormal sugars is inconsistent  Patient to discuss with [facility] Endo  Patient stated the life alert options to reviewed are too costly for her at this time  She is not working, applied for SSD in 2019 and has hearing scheduled for the end of this month to receive determination, and is currently living off of savings and credit cards  Encouraged patient to call [facility] to inquire about emergency response systems offered through insurance  Patient agreed and plans to call today  Will follow-up in roughly 1 week

## 2021-09-10 NOTE — TELEPHONE ENCOUNTER
Received denial for xifaxin from the scar filed  Spoke with pt, she will  SIBO test kit per More  Advised to call office with any questions or concerns

## 2021-09-15 ENCOUNTER — SOCIAL WORK (OUTPATIENT)
Dept: BEHAVIORAL/MENTAL HEALTH CLINIC | Facility: CLINIC | Age: 26
End: 2021-09-15
Payer: COMMERCIAL

## 2021-09-15 DIAGNOSIS — F43.12 CHRONIC POST-TRAUMATIC STRESS DISORDER (PTSD): ICD-10-CM

## 2021-09-15 DIAGNOSIS — F31.63 BIPOLAR DISORDER, CURRENT EPISODE MIXED, SEVERE, WITHOUT PSYCHOTIC FEATURES (HCC): ICD-10-CM

## 2021-09-15 DIAGNOSIS — F42.2 MIXED OBSESSIONAL THOUGHTS AND ACTS: Primary | ICD-10-CM

## 2021-09-15 DIAGNOSIS — F41.0 GENERALIZED ANXIETY DISORDER WITH PANIC ATTACKS: ICD-10-CM

## 2021-09-15 DIAGNOSIS — F41.1 GENERALIZED ANXIETY DISORDER WITH PANIC ATTACKS: ICD-10-CM

## 2021-09-15 PROCEDURE — 90834 PSYTX W PT 45 MINUTES: CPT | Performed by: SOCIAL WORKER

## 2021-09-16 NOTE — PSYCH
Psychotherapy Provided: Individual Psychotherapy 50 minutes     Length of time in session: 50 minutes, follow up in 1 week    Encounter Diagnosis     ICD-10-CM    1  Mixed obsessional thoughts and acts  F42 2    2  Generalized anxiety disorder with panic attacks  F41 1     F41 0    3  Chronic post-traumatic stress disorder (PTSD)  F43 12    4  Bipolar disorder, current episode mixed, severe, without psychotic features ([facility] [address] )  F31 63        Goals addressed in session: Goal 1, Goal 2 and Goal 3      Pain:      moderate to severe    7    Current suicide risk : Low     D: Met with Jenny individually  Session focused upon a journal entry Gudelia Jerry emailed to therapist in detail with processing  Gudelia Jerry began with grade school - lack of peer relationships, bullying, family dynamics etc   Denied SI    A: Gudelia Jerry presented focused and engaged  This is the beginning of her trauma story    P: Continue individual therapy    [address]: Diagnosis and Treatment Plan explained to Belén Camacho relates understanding diagnosis and is agreeable to Treatment Plan   Yes

## 2021-09-20 ENCOUNTER — PATIENT OUTREACH (OUTPATIENT)
Dept: INTERNAL MEDICINE CLINIC | Facility: CLINIC | Age: 26
End: 2021-09-20

## 2021-09-20 NOTE — PROGRESS NOTES
Attempted to outreach patient to check status, inquire if she was able to speak with [facility] regarding emergency response systems, and assess need for any additional assistance (SNAP, Utilities, etc )  No answer, voicemail left, and awaiting return call

## 2021-09-21 ENCOUNTER — OFFICE VISIT (OUTPATIENT)
Dept: PODIATRY | Facility: CLINIC | Age: 26
End: 2021-09-21
Payer: COMMERCIAL

## 2021-09-21 VITALS
WEIGHT: 162 LBS | DIASTOLIC BLOOD PRESSURE: 77 MMHG | HEART RATE: 70 BPM | SYSTOLIC BLOOD PRESSURE: 111 MMHG | BODY MASS INDEX: 29.39 KG/M2

## 2021-09-21 DIAGNOSIS — E10.65 TYPE 1 DIABETES MELLITUS WITH HYPERGLYCEMIA (HCC): ICD-10-CM

## 2021-09-21 DIAGNOSIS — L60.9 NAIL ABNORMALITY: ICD-10-CM

## 2021-09-21 DIAGNOSIS — G62.9 NEUROPATHY: ICD-10-CM

## 2021-09-21 DIAGNOSIS — Z96.41 DIABETES MELLITUS TYPE 1, UNCOMPLICATED, ON LONG TERM INSULIN PUMP (HCC): Primary | ICD-10-CM

## 2021-09-21 DIAGNOSIS — B07.9 VERRUCA: ICD-10-CM

## 2021-09-21 DIAGNOSIS — E10.9 DIABETES MELLITUS TYPE 1, UNCOMPLICATED, ON LONG TERM INSULIN PUMP (HCC): Primary | ICD-10-CM

## 2021-09-21 PROCEDURE — 3074F SYST BP LT 130 MM HG: CPT | Performed by: PODIATRIST

## 2021-09-21 PROCEDURE — 99243 OFF/OP CNSLTJ NEW/EST LOW 30: CPT | Performed by: PODIATRIST

## 2021-09-21 PROCEDURE — 3078F DIAST BP <80 MM HG: CPT | Performed by: PODIATRIST

## 2021-09-22 ENCOUNTER — TELEPHONE (OUTPATIENT)
Dept: NEUROLOGY | Facility: CLINIC | Age: 26
End: 2021-09-22

## 2021-09-22 NOTE — TELEPHONE ENCOUNTER
Received VM from patient stating she followed up with Endo and Podiatry and they believe her symptoms are not DM related but are indeed neurological - patient requesting f/u with Dr Delmy Miller    Patient scheduled with Dr Delmy Miller on 10/6 at [facility] patient and left detailed message (okay per consent form on file) informing her we received her VM and reminded her of appt day, time, and location

## 2021-09-23 ENCOUNTER — PATIENT OUTREACH (OUTPATIENT)
Dept: INTERNAL MEDICINE CLINIC | Facility: CLINIC | Age: 26
End: 2021-09-23

## 2021-09-23 PROBLEM — B07.9 VERRUCA: Status: ACTIVE | Noted: 2021-09-23

## 2021-09-23 NOTE — PROGRESS NOTES
Left message with my contact information for José Miguel Roberts to return my call if needed   Contact information left on message

## 2021-09-23 NOTE — PATIENT INSTRUCTIONS
Foot Care for People with Diabetes   WHAT YOU NEED TO KNOW:   · Foot care helps protect your feet and prevent foot ulcers or sores  Long-term high blood sugar levels can damage the blood vessels and nerves in your legs and feet  This damage makes it hard to feel pressure, pain, temperature, and touch  You may not be able to feel a cut or sore, or shoes that are too tight  Foot care is needed to prevent serious problems, such as an infection or amputation  · Diabetes may cause your toes to become crooked or curved under  These changes may affect the way you walk and can lead to increased pressure on your foot  The pressure can decrease blood flow to your feet  Lack of blood flow increases your risk for a foot ulcer  Do not ignore small problems, such as dry skin or small wounds  These can become life-threatening over time without proper care  DISCHARGE INSTRUCTIONS:   Call your care team provider if:   · Your feet become numb, weak, or hard to move  · You have pus draining from a sore on your foot  · You have a wound on your foot that gets bigger, deeper, or does not heal      · You see blisters, cuts, scratches, calluses, or sores on your foot  · You have a fever, and your feet become red, warm, and swollen  · Your toenails become thick, curled, or yellow  · You find it hard to check your feet because your vision is poor  · You have questions or concerns about your condition or care  Foot care:   · Check your feet each day  Look at your whole foot, including the bottom, and between and under your toes  Check for wounds, corns, and calluses  Use a mirror to see the bottom of your feet  The skin on your feet may be shiny, tight, or darker than normal  Your feet may also be cold and pale  Feel your feet by running your hands along the tops, bottoms, sides, and between your toes  Redness, swelling, and warmth are signs of blood flow problems that can lead to a foot ulcer   Do not try to remove corns or calluses yourself  · Wash your feet each day with soap and warm water  Do not use hot water, because this can injure your foot  Dry your feet gently with a towel after you wash them  Dry between and under your toes  · Apply lotion or a moisturizer on your dry feet  Ask your care team provider what lotions are best to use  Do not put lotion or moisturizer between your toes  Moisture between your toes could lead to skin breakdown  · Cut your toenails correctly  File or cut your toenails straight across  Use a soft brush to clean around your toenails  If your toenails are very thick, you may need to have a care team provider or specialist cut them  · Protect your feet  Do not walk barefoot or wear your shoes without socks  Check your shoes for rocks or other objects that can hurt your feet  Wear cotton socks to help keep your feet dry  Wear socks without toe seams, or wear them with the seams inside out  Change your socks each day  Do not wear socks that are dirty or damp  · Wear shoes that fit well  Wear shoes that do not rub against any area of your feet  Your shoes should be ½ to ¾ inch (1 to 2 centimeters) longer than your feet  Your shoes should also have extra space around the widest part of your feet  Walking or athletic shoes with laces or straps that adjust are best  Ask your care team provider for help to choose shoes that fit you best  Ask him or her if you need to wear an insert, orthotic, or bandage on your feet  · Do not smoke  Smoking can damage your blood vessels and put you at increased risk for foot ulcers  Ask your care team provider for information if you currently smoke and need help to quit  E-cigarettes or smokeless tobacco still contain nicotine  Talk to your care team provider before you use these products  Follow up with your diabetes care team provider or foot specialist as directed:   You will need to have your feet checked at least once a digna Lyon may need a foot exam more often if you have nerve damage, foot deformities, or ulcers  Write down your questions so you remember to ask them during your visits  © Copyright [employer] 2021 Information is for End User's use only and may not be sold, redistributed or otherwise used for commercial purposes  All illustrations and images included in CareNotes® are the copyrighted property of A D A M , Inc  or [facility] Lui [facility]aud   The above information is an  only  It is not intended as medical advice for individual conditions or treatments  Talk to your doctor, nurse or pharmacist before following any medical regimen to see if it is safe and effective for you

## 2021-09-23 NOTE — PROGRESS NOTES
This patient was seen on 9/21/21  My role is Foot , Ankle, and Wound Specialist    SUBJECTIVE    Chief Complaint:  Possible wart     Patient ID: Martha Scales is a 27 y o  female  Lay Terrell is here for the first time with a cc of a lesion on the plantar Left foot  It's been presents for months and doesn't hurt  She is an IDDM Type I diabetic  She struggles with glucose control  She also states she "never" wears shoes and basically wears flip flops all year due to sensory issues  She notes some numbness in her Left foot  She notes she wakes up sometimes with "bleeding toenails"  The following portions of the patient's history were reviewed and updated as appropriate: allergies, current medications, past family history, past medical history, past social history, past surgical history and problem list     Review of Systems   Constitutional: Negative  Respiratory: Negative  Cardiovascular: Negative  Gastrointestinal: Negative  Neurological: Positive for numbness  Psychiatric/Behavioral: The patient is nervous/anxious  OBJECTIVE      /77   Pulse 70   Wt 73 5 kg (162 lb)   BMI 29 39 kg/m²     Foot/Ankle Musculoskeletal Exam    General      Neurological: alert    Inspection      Inspection - Right        Right foot/ankle inspection is normal        Inspection - Left        Left foot/ankle inspection is normal       Neurovascular      Neurovascular - Right        Dorsalis pedis: 2+      Posterior tibial: 2+      Neurovascular - Left        Dorsalis pedis: 2+      Posterior tibial: 2+       Physical Exam  Vitals and nursing note reviewed  Constitutional:       General: She is not in acute distress  Appearance: Normal appearance  She is not ill-appearing, toxic-appearing or diaphoretic  HENT:      Head: Normocephalic and atraumatic  Cardiovascular:      Rate and Rhythm: Normal rate  Pulses: Normal pulses   no weak pulses          Dorsalis pedis pulses are 2+ on the right side and 2+ on the left side  Posterior tibial pulses are 2+ on the right side and 2+ on the left side  Pulmonary:      Effort: Pulmonary effort is normal    Abdominal:      General: Bowel sounds are normal    Feet:      Right foot:      Skin integrity: No ulcer, skin breakdown, erythema, warmth, callus or dry skin  Left foot:      Skin integrity: No ulcer, skin breakdown, erythema, warmth, callus or dry skin  Skin:     Capillary Refill: Capillary refill takes less than 2 seconds  Comments: I note a small round, 5mm diameter, raised papule with some cauliflower - appearing characteristics on the Left foot plantar arch  No ulcer associated  Neurological:      General: No focal deficit present  Mental Status: She is alert and oriented to person, place, and time  Diabetic Foot Exam    Patient's shoes and socks removed  Right Foot/Ankle   Right Foot Inspection  Skin Exam: skin normal and skin intact no dry skin, no warmth, no callus, no erythema, no maceration, no abnormal color, no pre-ulcer, no ulcer and no callus                          Toe Exam: ROM and strength within normal limits  Sensory   Vibration: intact  Proprioception: intact   Monofilament testing: diminished  Vascular  Capillary refills: < 3 seconds  The right DP pulse is 2+  The right PT pulse is 2+  Left Foot/Ankle  Left Foot Inspection  Skin Exam: skin normal and skin intactno dry skin, no warmth, no erythema, no maceration, normal color, no pre-ulcer, no ulcer and no callus                         Toe Exam: ROM and strength within normal limits                   Sensory   Vibration: intact  Proprioception: intact  Monofilament: diminished  Vascular  Capillary refills: < 3 seconds  The left DP pulse is 2+  The left PT pulse is 2+  Assign Risk Category:  No deformity present; Loss of protective sensation;  No weak pulses       Risk: 1      ASSESSMENT     Diagnoses and all orders for this visit:    Diabetes mellitus type 1, uncomplicated, on long term insulin pump ([facility])    Type 1 diabetes mellitus with hyperglycemia ([facility] )  -     Ambulatory referral to Podiatry    Nail abnormality  -     Ambulatory referral to Podiatry    Neuropathy    Verruca          Problem List Items Addressed This Visit        Endocrine    Type 1 diabetes mellitus with hyperglycemia ([facility] )    Diabetes mellitus type 1, uncomplicated, on long term insulin pump (HCC) - Primary       Nervous and Auditory    Neuropathy       Musculoskeletal and Integument    Nail abnormality       Other    Verruca          PLAN    I don't feel we should excise the wart nor do I recommend chemical debridement as I feel the resulting wound could be a nidus for infection  This is most concerning as her diabetes just isn't well-controlled right now and the lesion is small and not really bothering her  Her most recent A1C's that I reviewed was 7 8 on 7/16/21 and 8 4 on 8/21/21  I'm more concerned with her progressive neuropathy found on exam  I feel her history of waking up with "bloody toenails" and her habit of wearing flip flops is leaving her open for future trauma, wounds, infections, and limb loss  High risk  foot precautions reviewed with patient including the need to wear protective (closed) shoegear at all times when walking including in the home, the need to check feet all surfaces daily with a mirror and report and skin breaks to podiatry, the need to apply an emollient to skin of feet daily

## 2021-09-29 ENCOUNTER — TELEMEDICINE (OUTPATIENT)
Dept: BEHAVIORAL/MENTAL HEALTH CLINIC | Facility: CLINIC | Age: 26
End: 2021-09-29
Payer: COMMERCIAL

## 2021-09-29 DIAGNOSIS — F43.12 CHRONIC POST-TRAUMATIC STRESS DISORDER (PTSD): ICD-10-CM

## 2021-09-29 DIAGNOSIS — F41.0 GENERALIZED ANXIETY DISORDER WITH PANIC ATTACKS: ICD-10-CM

## 2021-09-29 DIAGNOSIS — F42.2 MIXED OBSESSIONAL THOUGHTS AND ACTS: Primary | ICD-10-CM

## 2021-09-29 DIAGNOSIS — F41.1 GENERALIZED ANXIETY DISORDER WITH PANIC ATTACKS: ICD-10-CM

## 2021-09-29 PROCEDURE — 90834 PSYTX W PT 45 MINUTES: CPT | Performed by: SOCIAL WORKER

## 2021-09-29 NOTE — PSYCH
Virtual Regular Visit    Verification of patient location:    Patient is located in the following state in which I hold an active license [place]      Assessment/Plan:    Problem List Items Addressed This Visit        Other    Chronic post-traumatic stress disorder (PTSD)    Generalized anxiety disorder with panic attacks    OCD (obsessive compulsive disorder) - Primary          Goals addressed in session: Goal 1, Goal 2 and Goal 3           Reason for visit is No chief complaint on file  Encounter provider aVsile Phillips    Provider located at [address] [place] [id number] [phone]      Recent Visits  No visits were found meeting these conditions  Showing recent visits within past 7 days and meeting all other requirements  Today's Visits  Date Type Provider Dept   09/29/21 Telemedicine Vasile Phillips [facility] Therapist Ivan   Showing today's visits and meeting all other requirements  Future Appointments  No visits were found meeting these conditions  Showing future appointments within next 150 days and meeting all other requirements       The patient was identified by name and date of birth  Robert Jang was informed that this is a telemedicine visit and [facility] and patient was informed that this is a secure, HIPAA-compliant platform  She agrees to proceed     My office door was closed  No one else was in the room  She acknowledged consent and understanding of privacy and security of the video platform  The patient has agreed to participate and understands they can discontinue the visit at any time  Patient is aware this is a billable service  Subjective  Robert Jang is a 27 y o  female    D: Met with Jamin Castro individually  Several triggers over past few days regarding father    Jamin Castro refused to drive father home from appointment - stood ground  Resistant towards session last week - 'I didn't realize how depressed I've been '   Unknown of any additional triggers  Denied mood fluctuations 'now it's just down all the way '  Denied SI  Denied anything to do with Benigno Manzano- medical obstacles remains  Hasn't completed school assignments - 'I just lay in bed and stare at the ceiling  I don't want to do anything '  Mom came over to help Rosendo Hermosillo clean and had her eat  Denied SI    A: Rosendo Hermosillo presented detached, limited eye contact, slower speech  Multiple stressors remain - thyroid, sugars and hormones remains uncontrolled  P: Continue individual therapy      HPI     Past Medical History:   Diagnosis Date    Abdominal pain     Anxiety     Anxiety and depression     COVID-19 12/2020    Depression     Eating disorder     history of anorexia/bulemia 9926-7726    Fracture of fibula     R Salter I   Pily Muster disease     Hashimoto's disease 2/21/2020    Head injury     Nasal congestion     PTSD (post-traumatic stress disorder)     Rectal bleed     Seizures (HCC)        Past Surgical History:   Procedure Laterality Date    COLONOSCOPY      KNEE SURGERY Right 07/06/2020    NASAL SEPTOPLASTY W/ TURBINOPLASTY      SINUS SURGERY      SKIN BIOPSY      TURBINOPLASTY N/A 3/22/2021    Procedure: TURBINOPLASTY;  Surgeon: Flex Pacheco MD;  [facility]: [facility];  Service: ENT    UPPER GASTROINTESTINAL ENDOSCOPY      WISDOM TOOTH EXTRACTION      WRIST SURGERY      left;  Excision of ganglion       Current Outpatient Medications   Medication Sig Dispense Refill    ACETONE, URINE, TEST VI Use as needed to test your urine for ketones      albuterol (PROVENTIL HFA,VENTOLIN HFA) 90 mcg/act inhaler Inhale 1 puff every 6 (six) hours as needed for wheezing or shortness of breath 1 Inhaler 5    Altavera 0 15-30 MG-MCG per tablet TAKE ONE TABLET BY MOUTH EVERY DAY 84 tablet 3    amitriptyline (ELAVIL) 10 mg tablet Take 1 tablet (10 mg total) by mouth daily at bedtime 30 tablet 11    ARIPiprazole (ABILIFY) 10 mg tablet TAKE 1 TABLET BY MOUTH EVERY DAY 30 tablet 2    Blood Glucose Monitoring Suppl (ONETOUCH VERIO) w/Device KIT Use as directed  0    Cholecalciferol (Vitamin D3) 1 25 MG (72622 UT) CAPS Take 50,000 Units by mouth once a week       Cholecalciferol (Vitamin D3) 50 MCG (2000 UT) TABS Take 2,000 Units by mouth daily 6 days a week      cholestyramine (QUESTRAN) 4 g packet Take 1 packet (4 g total) by mouth daily Mix with at least 2-3 oz of water or beverage of your choice and take with a meal  Take other medications at least 1 hour before or 4 hours after to avoid malabsorption of your other medications   30 packet 1    clonazePAM (KlonoPIN) 0 5 mg tablet Take 1 tablet (0 5 mg total) by mouth 2 (two) times a day 45 tablet 2    desvenlafaxine succinate (PRISTIQ) 50 mg 24 hr tablet Take 1 tablet (50 mg total) by mouth daily 30 tablet 2    divalproex sodium (DEPAKOTE ER) 250 mg 24 hr tablet Take 1 tablet (250 mg total) by mouth daily at bedtime 30 tablet 2    divalproex sodium (DEPAKOTE ER) 500 mg 24 hr tablet Take 1 tablet (500 mg total) by mouth daily 30 tablet 2    EPINEPHrine (EPIPEN) 0 3 mg/0 3 mL SOAJ Inject 0 3 mL (0 3 mg total) into a muscle once for 1 dose 0 6 mL 0    ergocalciferol (VITAMIN D2) 50,000 units 50,000 Units once a week      folic acid (FOLVITE) 1 mg tablet Take 1 mg by mouth daily      glucagon (GLUCAGON EMERGENCY) 1 MG injection Inject 1 mg under the skin once as needed for low blood sugar for up to 2 doses 1 kit 1    hydroxychloroquine (PLAQUENIL) 200 mg tablet Take 1 tablet (200 mg total) by mouth 2 (two) times a day with meals 60 tablet 5    ibuprofen (MOTRIN) 400 mg tablet Take 1 tablet (400 mg total) by mouth every 6 (six) hours as needed for mild pain for up to 3 days 12 tablet 0    insulin aspart (NovoLOG) 100 units/mL injection Use 30 units per day via pump Disp 1 vial per month (Patient taking differently: Use 100units per day via pump Disp 2 vial per month) 30 mL 1    insulin degludec (TRESIBA) 100 units/mL injection pen 34 units once daily E10 65      Insulin Infusion Pump KIT 13 mm cannula, 23 inch tubing change site every 3 days      Insulin Pen Needle (BD PEN NEEDLE NASIM U/F) 32G X 4 MM MISC by Does not apply route 4 (four) times a day for 180 days 400 each 3    Insulin Syringe-Needle U-100 31G X 5/16" 0 5 ML MISC Use to draw up your insulin      Lancets MISC Use      levothyroxine 25 mcg tablet Take 1 tablet (25 mcg total) by mouth daily 60 tablet 0    magnesium oxide (MAG-OX) 400 mg tablet Take 1 tablet by mouth daily      magnesium oxide (MAG-OX) 400 mg Take 1 tablet (400 mg total) by mouth daily 90 tablet 0    Multiple Vitamin (DAILY VALUE MULTIVITAMIN) TABS Take by mouth daily       ONETOUCH DELICA LANCETS 23Y MISC Patient test 6 times daily 600 each 1    ONETOUCH VERIO test strip Test six times a day 600 each 3     No current facility-administered medications for this visit  Allergies   Allergen Reactions    Iodine - Food Allergy Throat Swelling    Insulin Glargine Other (See Comments)     Burning and redness under skin       Review of Systems        I spent 50 minutes directly with the patient during this visit    VIRTUAL VISIT DISCLAIMER    Nikki Marte verbally agrees to participate in [employer]  Pt is aware that [employer] could be limited without vital signs or the ability to perform a full hands-on physical exam  Jenny Rodrigues understands she or the provider may request at any time to terminate the video visit and request the patient to seek care or treatment in person

## 2021-10-01 ENCOUNTER — PATIENT OUTREACH (OUTPATIENT)
Dept: INTERNAL MEDICINE CLINIC | Facility: CLINIC | Age: 26
End: 2021-10-01

## 2021-10-05 ENCOUNTER — APPOINTMENT (OUTPATIENT)
Dept: LAB | Facility: HOSPITAL | Age: 26
End: 2021-10-05
Attending: PSYCHIATRY & NEUROLOGY
Payer: MEDICARE

## 2021-10-05 ENCOUNTER — TELEPHONE (OUTPATIENT)
Dept: PSYCHIATRY | Facility: CLINIC | Age: 26
End: 2021-10-05

## 2021-10-05 ENCOUNTER — TRANSCRIBE ORDERS (OUTPATIENT)
Dept: LAB | Facility: HOSPITAL | Age: 26
End: 2021-10-05

## 2021-10-05 DIAGNOSIS — R79.89 ELEVATED PROLACTIN LEVEL: Primary | ICD-10-CM

## 2021-10-05 DIAGNOSIS — E22.1 HYPERPROLACTINEMIA (HCC): ICD-10-CM

## 2021-10-05 DIAGNOSIS — R79.89 ELEVATED PROLACTIN LEVEL: ICD-10-CM

## 2021-10-05 LAB — PROLACTIN SERPL-MCNC: 8.1 NG/ML

## 2021-10-05 PROCEDURE — 84146 ASSAY OF PROLACTIN: CPT

## 2021-10-05 PROCEDURE — 36415 COLL VENOUS BLD VENIPUNCTURE: CPT

## 2021-10-06 ENCOUNTER — TELEMEDICINE (OUTPATIENT)
Dept: BEHAVIORAL/MENTAL HEALTH CLINIC | Facility: CLINIC | Age: 26
End: 2021-10-06
Payer: COMMERCIAL

## 2021-10-06 ENCOUNTER — OFFICE VISIT (OUTPATIENT)
Dept: NEUROLOGY | Facility: CLINIC | Age: 26
End: 2021-10-06
Payer: MEDICARE

## 2021-10-06 VITALS
SYSTOLIC BLOOD PRESSURE: 108 MMHG | DIASTOLIC BLOOD PRESSURE: 74 MMHG | BODY MASS INDEX: 30.61 KG/M2 | TEMPERATURE: 97.9 F | WEIGHT: 162.1 LBS | HEART RATE: 77 BPM | HEIGHT: 61 IN

## 2021-10-06 DIAGNOSIS — E06.3 HASHIMOTO'S DISEASE: ICD-10-CM

## 2021-10-06 DIAGNOSIS — F43.12 CHRONIC POST-TRAUMATIC STRESS DISORDER (PTSD): ICD-10-CM

## 2021-10-06 DIAGNOSIS — F31.63 BIPOLAR DISORDER, CURRENT EPISODE MIXED, SEVERE, WITHOUT PSYCHOTIC FEATURES (HCC): ICD-10-CM

## 2021-10-06 DIAGNOSIS — R56.9 SEIZURES (HCC): ICD-10-CM

## 2021-10-06 DIAGNOSIS — E10.65 TYPE 1 DIABETES MELLITUS WITH HYPERGLYCEMIA (HCC): ICD-10-CM

## 2021-10-06 DIAGNOSIS — F42.2 MIXED OBSESSIONAL THOUGHTS AND ACTS: Primary | ICD-10-CM

## 2021-10-06 DIAGNOSIS — R76.8 RHEUMATOID FACTOR POSITIVE: ICD-10-CM

## 2021-10-06 DIAGNOSIS — R53.1 WEAKNESS GENERALIZED: Primary | ICD-10-CM

## 2021-10-06 DIAGNOSIS — F41.0 GENERALIZED ANXIETY DISORDER WITH PANIC ATTACKS: ICD-10-CM

## 2021-10-06 DIAGNOSIS — E53.8 LOW SERUM VITAMIN B12: ICD-10-CM

## 2021-10-06 DIAGNOSIS — F41.1 GENERALIZED ANXIETY DISORDER WITH PANIC ATTACKS: ICD-10-CM

## 2021-10-06 DIAGNOSIS — R47.89 WORD FINDING DIFFICULTY: ICD-10-CM

## 2021-10-06 PROCEDURE — 3078F DIAST BP <80 MM HG: CPT | Performed by: PSYCHIATRY & NEUROLOGY

## 2021-10-06 PROCEDURE — 3074F SYST BP LT 130 MM HG: CPT | Performed by: PSYCHIATRY & NEUROLOGY

## 2021-10-06 PROCEDURE — 99215 OFFICE O/P EST HI 40 MIN: CPT | Performed by: PSYCHIATRY & NEUROLOGY

## 2021-10-06 PROCEDURE — 90834 PSYTX W PT 45 MINUTES: CPT | Performed by: SOCIAL WORKER

## 2021-10-07 ENCOUNTER — PATIENT OUTREACH (OUTPATIENT)
Dept: INTERNAL MEDICINE CLINIC | Facility: CLINIC | Age: 26
End: 2021-10-07

## 2021-10-14 DIAGNOSIS — F31.0 BIPOLAR DISORDER, CURRENT EPISODE HYPOMANIC (HCC): ICD-10-CM

## 2021-10-14 RX ORDER — ARIPIPRAZOLE 10 MG/1
TABLET ORAL
Qty: 30 TABLET | Refills: 2 | Status: SHIPPED | OUTPATIENT
Start: 2021-10-14 | End: 2021-11-02

## 2021-10-15 NOTE — PROGRESS NOTES
Discharge Note  Fco Ty has made minimal functional progress with physical therapy  Leatha Meza will be discharged from formal therapy due to life circumstances and further follow ups c MD for elevated sugars

## 2021-10-20 ENCOUNTER — TELEMEDICINE (OUTPATIENT)
Dept: BEHAVIORAL/MENTAL HEALTH CLINIC | Facility: CLINIC | Age: 26
End: 2021-10-20
Payer: COMMERCIAL

## 2021-10-20 DIAGNOSIS — F41.0 GENERALIZED ANXIETY DISORDER WITH PANIC ATTACKS: ICD-10-CM

## 2021-10-20 DIAGNOSIS — F41.1 GENERALIZED ANXIETY DISORDER WITH PANIC ATTACKS: ICD-10-CM

## 2021-10-20 DIAGNOSIS — F43.12 CHRONIC POST-TRAUMATIC STRESS DISORDER (PTSD): ICD-10-CM

## 2021-10-20 DIAGNOSIS — F31.63 BIPOLAR DISORDER, CURRENT EPISODE MIXED, SEVERE, WITHOUT PSYCHOTIC FEATURES (HCC): ICD-10-CM

## 2021-10-20 DIAGNOSIS — F42.2 MIXED OBSESSIONAL THOUGHTS AND ACTS: Primary | ICD-10-CM

## 2021-10-20 PROCEDURE — 90834 PSYTX W PT 45 MINUTES: CPT | Performed by: SOCIAL WORKER

## 2021-10-21 ENCOUNTER — TRANSITIONAL CARE MANAGEMENT (OUTPATIENT)
Dept: INTERNAL MEDICINE CLINIC | Facility: CLINIC | Age: 26
End: 2021-10-21

## 2021-10-26 DIAGNOSIS — K58.0 IRRITABLE BOWEL SYNDROME WITH DIARRHEA: ICD-10-CM

## 2021-10-26 RX ORDER — CHOLESTYRAMINE 4 G/9G
1 POWDER, FOR SUSPENSION ORAL DAILY
Qty: 30 PACKET | Refills: 1 | Status: SHIPPED | OUTPATIENT
Start: 2021-10-26 | End: 2021-11-02

## 2021-10-27 ENCOUNTER — SOCIAL WORK (OUTPATIENT)
Dept: BEHAVIORAL/MENTAL HEALTH CLINIC | Facility: CLINIC | Age: 26
End: 2021-10-27
Payer: COMMERCIAL

## 2021-10-27 DIAGNOSIS — F41.1 GENERALIZED ANXIETY DISORDER WITH PANIC ATTACKS: ICD-10-CM

## 2021-10-27 DIAGNOSIS — F42.2 MIXED OBSESSIONAL THOUGHTS AND ACTS: Primary | ICD-10-CM

## 2021-10-27 DIAGNOSIS — F31.63 BIPOLAR DISORDER, CURRENT EPISODE MIXED, SEVERE, WITHOUT PSYCHOTIC FEATURES (HCC): ICD-10-CM

## 2021-10-27 DIAGNOSIS — F41.0 GENERALIZED ANXIETY DISORDER WITH PANIC ATTACKS: ICD-10-CM

## 2021-10-27 PROCEDURE — 90834 PSYTX W PT 45 MINUTES: CPT | Performed by: SOCIAL WORKER

## 2021-10-28 ENCOUNTER — OFFICE VISIT (OUTPATIENT)
Dept: INTERNAL MEDICINE CLINIC | Facility: CLINIC | Age: 26
End: 2021-10-28
Payer: MEDICARE

## 2021-10-28 VITALS
SYSTOLIC BLOOD PRESSURE: 116 MMHG | DIASTOLIC BLOOD PRESSURE: 72 MMHG | WEIGHT: 157 LBS | TEMPERATURE: 98 F | HEART RATE: 76 BPM | RESPIRATION RATE: 18 BRPM | OXYGEN SATURATION: 99 % | HEIGHT: 61 IN | BODY MASS INDEX: 29.64 KG/M2

## 2021-10-28 DIAGNOSIS — F31.63 BIPOLAR DISORDER, CURRENT EPISODE MIXED, SEVERE, WITHOUT PSYCHOTIC FEATURES (HCC): ICD-10-CM

## 2021-10-28 DIAGNOSIS — Z23 NEED FOR INFLUENZA VACCINATION: ICD-10-CM

## 2021-10-28 DIAGNOSIS — R76.8 POSITIVE ANA (ANTINUCLEAR ANTIBODY): ICD-10-CM

## 2021-10-28 DIAGNOSIS — E10.65 TYPE 1 DIABETES MELLITUS WITH HYPERGLYCEMIA (HCC): Primary | ICD-10-CM

## 2021-10-28 DIAGNOSIS — R11.2 NAUSEA AND VOMITING, INTRACTABILITY OF VOMITING NOT SPECIFIED, UNSPECIFIED VOMITING TYPE: ICD-10-CM

## 2021-10-28 DIAGNOSIS — E16.2 HYPOGLYCEMIA: ICD-10-CM

## 2021-10-28 PROCEDURE — 90471 IMMUNIZATION ADMIN: CPT | Performed by: NURSE PRACTITIONER

## 2021-10-28 PROCEDURE — 90686 IIV4 VACC NO PRSV 0.5 ML IM: CPT | Performed by: NURSE PRACTITIONER

## 2021-10-28 PROCEDURE — 99214 OFFICE O/P EST MOD 30 MIN: CPT | Performed by: NURSE PRACTITIONER

## 2021-10-28 RX ORDER — PROMETHAZINE HYDROCHLORIDE 12.5 MG/1
12.5 TABLET ORAL EVERY 6 HOURS PRN
Qty: 30 TABLET | Refills: 0 | Status: SHIPPED | OUTPATIENT
Start: 2021-10-28 | End: 2022-05-16

## 2021-11-01 ENCOUNTER — TELEPHONE (OUTPATIENT)
Dept: OBGYN CLINIC | Facility: CLINIC | Age: 26
End: 2021-11-01

## 2021-11-02 ENCOUNTER — TELEPHONE (OUTPATIENT)
Dept: PSYCHIATRY | Facility: CLINIC | Age: 26
End: 2021-11-02

## 2021-11-02 ENCOUNTER — OFFICE VISIT (OUTPATIENT)
Dept: PSYCHIATRY | Facility: CLINIC | Age: 26
End: 2021-11-02
Payer: MEDICARE

## 2021-11-02 DIAGNOSIS — F31.4 BIPOLAR DISORDER WITH SEVERE DEPRESSION (HCC): Primary | ICD-10-CM

## 2021-11-02 DIAGNOSIS — Z30.011 ENCOUNTER FOR INITIAL PRESCRIPTION OF CONTRACEPTIVE PILLS: Primary | ICD-10-CM

## 2021-11-02 DIAGNOSIS — F41.0 GENERALIZED ANXIETY DISORDER WITH PANIC ATTACKS: ICD-10-CM

## 2021-11-02 DIAGNOSIS — F31.0 BIPOLAR DISORDER, CURRENT EPISODE HYPOMANIC (HCC): ICD-10-CM

## 2021-11-02 DIAGNOSIS — F41.1 GENERALIZED ANXIETY DISORDER WITH PANIC ATTACKS: ICD-10-CM

## 2021-11-02 PROBLEM — E03.9 PRIMARY HYPOTHYROIDISM: Status: ACTIVE | Noted: 2021-08-18

## 2021-11-02 PROCEDURE — 99214 OFFICE O/P EST MOD 30 MIN: CPT | Performed by: PSYCHIATRY & NEUROLOGY

## 2021-11-02 RX ORDER — ADAPALENE AND BENZOYL PEROXIDE .1; 2.5 G/100G; G/100G
GEL TOPICAL
COMMUNITY
Start: 2021-10-29 | End: 2022-01-13 | Stop reason: ALTCHOICE

## 2021-11-02 RX ORDER — MOMETASONE FUROATE 1 MG/ML
SOLUTION TOPICAL
COMMUNITY
Start: 2021-11-01 | End: 2022-05-16

## 2021-11-02 RX ORDER — KETOCONAZOLE 20 MG/ML
SHAMPOO TOPICAL
COMMUNITY
Start: 2021-09-21 | End: 2021-12-21 | Stop reason: ALTCHOICE

## 2021-11-02 RX ORDER — DIVALPROEX SODIUM 500 MG/1
500 TABLET, EXTENDED RELEASE ORAL DAILY
Qty: 30 TABLET | Refills: 2 | Status: SHIPPED | OUTPATIENT
Start: 2021-11-02 | End: 2022-05-16

## 2021-11-02 RX ORDER — DIVALPROEX SODIUM 250 MG/1
250 TABLET, EXTENDED RELEASE ORAL
Qty: 30 TABLET | Refills: 2 | Status: SHIPPED | OUTPATIENT
Start: 2021-11-02 | End: 2021-12-06

## 2021-11-02 RX ORDER — ARIPIPRAZOLE 5 MG/1
5 TABLET ORAL DAILY
COMMUNITY
Start: 2021-09-24 | End: 2021-11-02

## 2021-11-02 RX ORDER — LEVONORGESTREL AND ETHINYL ESTRADIOL 0.15-0.03
1 KIT ORAL DAILY
Qty: 84 TABLET | Refills: 2 | Status: SHIPPED | OUTPATIENT
Start: 2021-11-02 | End: 2022-05-23

## 2021-11-02 RX ORDER — METFORMIN HYDROCHLORIDE 500 MG/1
1000 TABLET, EXTENDED RELEASE ORAL 2 TIMES DAILY WITH MEALS
COMMUNITY
Start: 2021-09-22

## 2021-11-02 RX ORDER — CICLOPIROX 1 G/100ML
SHAMPOO TOPICAL
COMMUNITY
Start: 2021-11-01

## 2021-11-02 RX ORDER — CLONAZEPAM 0.5 MG/1
0.5 TABLET ORAL 2 TIMES DAILY
Qty: 45 TABLET | Refills: 2 | Status: SHIPPED | OUTPATIENT
Start: 2021-11-02 | End: 2022-06-03 | Stop reason: SDUPTHER

## 2021-11-02 NOTE — TELEPHONE ENCOUNTER
Patient cancelled therapy appointment for 11/3  She states she is having issues with her insurance company

## 2021-11-05 ENCOUNTER — OFFICE VISIT (OUTPATIENT)
Dept: GASTROENTEROLOGY | Facility: AMBULARY SURGERY CENTER | Age: 26
End: 2021-11-05
Payer: COMMERCIAL

## 2021-11-05 VITALS
BODY MASS INDEX: 29.94 KG/M2 | DIASTOLIC BLOOD PRESSURE: 84 MMHG | SYSTOLIC BLOOD PRESSURE: 114 MMHG | WEIGHT: 158.6 LBS | HEIGHT: 61 IN

## 2021-11-05 DIAGNOSIS — R19.5 ABNORMAL STOOLS: ICD-10-CM

## 2021-11-05 DIAGNOSIS — R11.2 NAUSEA AND VOMITING, INTRACTABILITY OF VOMITING NOT SPECIFIED, UNSPECIFIED VOMITING TYPE: Primary | ICD-10-CM

## 2021-11-05 PROCEDURE — 3079F DIAST BP 80-89 MM HG: CPT | Performed by: INTERNAL MEDICINE

## 2021-11-05 PROCEDURE — 3074F SYST BP LT 130 MM HG: CPT | Performed by: INTERNAL MEDICINE

## 2021-11-05 PROCEDURE — 99214 OFFICE O/P EST MOD 30 MIN: CPT | Performed by: INTERNAL MEDICINE

## 2021-11-05 RX ORDER — PANTOPRAZOLE SODIUM 40 MG/1
40 TABLET, DELAYED RELEASE ORAL DAILY
Qty: 90 TABLET | Refills: 0 | Status: SHIPPED | OUTPATIENT
Start: 2021-11-05 | End: 2022-05-16 | Stop reason: SDUPTHER

## 2021-11-17 ENCOUNTER — SOCIAL WORK (OUTPATIENT)
Dept: BEHAVIORAL/MENTAL HEALTH CLINIC | Facility: CLINIC | Age: 26
End: 2021-11-17
Payer: MEDICARE

## 2021-11-17 DIAGNOSIS — F41.0 GENERALIZED ANXIETY DISORDER WITH PANIC ATTACKS: ICD-10-CM

## 2021-11-17 DIAGNOSIS — F43.12 CHRONIC POST-TRAUMATIC STRESS DISORDER (PTSD): ICD-10-CM

## 2021-11-17 DIAGNOSIS — F41.1 GENERALIZED ANXIETY DISORDER WITH PANIC ATTACKS: ICD-10-CM

## 2021-11-17 DIAGNOSIS — F42.2 MIXED OBSESSIONAL THOUGHTS AND ACTS: Primary | ICD-10-CM

## 2021-11-17 DIAGNOSIS — F31.63 BIPOLAR DISORDER, CURRENT EPISODE MIXED, SEVERE, WITHOUT PSYCHOTIC FEATURES (HCC): ICD-10-CM

## 2021-11-17 PROCEDURE — 90834 PSYTX W PT 45 MINUTES: CPT | Performed by: SOCIAL WORKER

## 2021-12-01 ENCOUNTER — SOCIAL WORK (OUTPATIENT)
Dept: BEHAVIORAL/MENTAL HEALTH CLINIC | Facility: CLINIC | Age: 26
End: 2021-12-01
Payer: MEDICARE

## 2021-12-01 DIAGNOSIS — F41.1 GENERALIZED ANXIETY DISORDER WITH PANIC ATTACKS: ICD-10-CM

## 2021-12-01 DIAGNOSIS — F31.63 BIPOLAR DISORDER, CURRENT EPISODE MIXED, SEVERE, WITHOUT PSYCHOTIC FEATURES (HCC): ICD-10-CM

## 2021-12-01 DIAGNOSIS — F41.0 GENERALIZED ANXIETY DISORDER WITH PANIC ATTACKS: ICD-10-CM

## 2021-12-01 DIAGNOSIS — F43.12 CHRONIC POST-TRAUMATIC STRESS DISORDER (PTSD): ICD-10-CM

## 2021-12-01 DIAGNOSIS — F42.2 MIXED OBSESSIONAL THOUGHTS AND ACTS: Primary | ICD-10-CM

## 2021-12-01 PROCEDURE — 90834 PSYTX W PT 45 MINUTES: CPT | Performed by: SOCIAL WORKER

## 2021-12-05 DIAGNOSIS — F31.0 BIPOLAR DISORDER, CURRENT EPISODE HYPOMANIC (HCC): ICD-10-CM

## 2021-12-06 RX ORDER — DIVALPROEX SODIUM 250 MG/1
250 TABLET, EXTENDED RELEASE ORAL
Qty: 90 TABLET | Refills: 1 | Status: SHIPPED | OUTPATIENT
Start: 2021-12-06 | End: 2022-05-16

## 2021-12-08 ENCOUNTER — SOCIAL WORK (OUTPATIENT)
Dept: BEHAVIORAL/MENTAL HEALTH CLINIC | Facility: CLINIC | Age: 26
End: 2021-12-08
Payer: MEDICARE

## 2021-12-08 DIAGNOSIS — F41.0 GENERALIZED ANXIETY DISORDER WITH PANIC ATTACKS: ICD-10-CM

## 2021-12-08 DIAGNOSIS — F43.12 CHRONIC POST-TRAUMATIC STRESS DISORDER (PTSD): ICD-10-CM

## 2021-12-08 DIAGNOSIS — F42.2 MIXED OBSESSIONAL THOUGHTS AND ACTS: Primary | ICD-10-CM

## 2021-12-08 DIAGNOSIS — F31.63 BIPOLAR DISORDER, CURRENT EPISODE MIXED, SEVERE, WITHOUT PSYCHOTIC FEATURES (HCC): ICD-10-CM

## 2021-12-08 DIAGNOSIS — F41.1 GENERALIZED ANXIETY DISORDER WITH PANIC ATTACKS: ICD-10-CM

## 2021-12-08 PROCEDURE — 90834 PSYTX W PT 45 MINUTES: CPT | Performed by: SOCIAL WORKER

## 2021-12-14 ENCOUNTER — HOSPITAL ENCOUNTER (OUTPATIENT)
Dept: NON INVASIVE DIAGNOSTICS | Facility: CLINIC | Age: 26
Discharge: HOME | End: 2021-12-14
Payer: MEDICARE

## 2021-12-14 DIAGNOSIS — R11.2 NAUSEA AND VOMITING, INTRACTABILITY OF VOMITING NOT SPECIFIED, UNSPECIFIED VOMITING TYPE: ICD-10-CM

## 2021-12-14 PROCEDURE — 78264 GASTRIC EMPTYING IMG STUDY: CPT

## 2021-12-14 PROCEDURE — A9541 TC99M SULFUR COLLOID: HCPCS

## 2021-12-14 PROCEDURE — G1004 CDSM NDSC: HCPCS

## 2021-12-21 ENCOUNTER — OFFICE VISIT (OUTPATIENT)
Dept: INTERNAL MEDICINE CLINIC | Facility: CLINIC | Age: 26
End: 2021-12-21
Payer: MEDICARE

## 2021-12-21 VITALS
OXYGEN SATURATION: 99 % | HEART RATE: 93 BPM | SYSTOLIC BLOOD PRESSURE: 110 MMHG | TEMPERATURE: 98.6 F | WEIGHT: 158 LBS | DIASTOLIC BLOOD PRESSURE: 66 MMHG | BODY MASS INDEX: 29.83 KG/M2 | HEIGHT: 61 IN

## 2021-12-21 DIAGNOSIS — R53.81 PHYSICAL DECONDITIONING: ICD-10-CM

## 2021-12-21 DIAGNOSIS — M54.9 RIGHT-SIDED BACK PAIN, UNSPECIFIED BACK LOCATION, UNSPECIFIED CHRONICITY: ICD-10-CM

## 2021-12-21 DIAGNOSIS — E06.3 HASHIMOTO'S DISEASE: ICD-10-CM

## 2021-12-21 DIAGNOSIS — R10.9 ACUTE RIGHT FLANK PAIN: ICD-10-CM

## 2021-12-21 DIAGNOSIS — E10.649 UNCONTROLLED TYPE 1 DIABETES MELLITUS WITH HYPOGLYCEMIA WITHOUT COMA (HCC): ICD-10-CM

## 2021-12-21 DIAGNOSIS — Z00.00 MEDICARE ANNUAL WELLNESS VISIT, SUBSEQUENT: Primary | ICD-10-CM

## 2021-12-21 DIAGNOSIS — M13.0 POLYARTHRITIS: ICD-10-CM

## 2021-12-21 DIAGNOSIS — F31.63 BIPOLAR DISORDER, CURRENT EPISODE MIXED, SEVERE, WITHOUT PSYCHOTIC FEATURES (HCC): ICD-10-CM

## 2021-12-21 PROCEDURE — 99214 OFFICE O/P EST MOD 30 MIN: CPT | Performed by: NURSE PRACTITIONER

## 2021-12-21 PROCEDURE — G0402 INITIAL PREVENTIVE EXAM: HCPCS | Performed by: NURSE PRACTITIONER

## 2021-12-29 ENCOUNTER — SOCIAL WORK (OUTPATIENT)
Dept: BEHAVIORAL/MENTAL HEALTH CLINIC | Facility: CLINIC | Age: 26
End: 2021-12-29
Payer: MEDICARE

## 2021-12-29 DIAGNOSIS — F31.63 BIPOLAR DISORDER, CURRENT EPISODE MIXED, SEVERE, WITHOUT PSYCHOTIC FEATURES (HCC): ICD-10-CM

## 2021-12-29 DIAGNOSIS — F41.1 GENERALIZED ANXIETY DISORDER WITH PANIC ATTACKS: ICD-10-CM

## 2021-12-29 DIAGNOSIS — F41.0 GENERALIZED ANXIETY DISORDER WITH PANIC ATTACKS: ICD-10-CM

## 2021-12-29 DIAGNOSIS — F42.2 MIXED OBSESSIONAL THOUGHTS AND ACTS: Primary | ICD-10-CM

## 2021-12-29 PROCEDURE — 90834 PSYTX W PT 45 MINUTES: CPT | Performed by: SOCIAL WORKER

## 2022-01-03 ENCOUNTER — APPOINTMENT (OUTPATIENT)
Dept: LAB | Facility: CLINIC | Age: 27
End: 2022-01-03
Payer: COMMERCIAL

## 2022-01-03 DIAGNOSIS — E10.69 TYPE I DIABETES MELLITUS WITH HYPEROSMOLAR COMA (HCC): ICD-10-CM

## 2022-01-03 DIAGNOSIS — E10.65 TYPE I DIABETES MELLITUS WITH HYPEROSMOLAR COMA (HCC): ICD-10-CM

## 2022-01-03 LAB
ALBUMIN SERPL BCP-MCNC: 4 G/DL (ref 3.5–5)
ALP SERPL-CCNC: 65 U/L (ref 46–116)
ALT SERPL W P-5'-P-CCNC: 20 U/L (ref 12–78)
ANION GAP SERPL CALCULATED.3IONS-SCNC: 11 MMOL/L (ref 4–13)
AST SERPL W P-5'-P-CCNC: 17 U/L (ref 5–45)
BILIRUB SERPL-MCNC: 0.95 MG/DL (ref 0.2–1)
BUN SERPL-MCNC: 11 MG/DL (ref 5–25)
CALCIUM SERPL-MCNC: 9 MG/DL (ref 8.3–10.1)
CHLORIDE SERPL-SCNC: 101 MMOL/L (ref 100–108)
CHOLEST SERPL-MCNC: 199 MG/DL
CO2 SERPL-SCNC: 24 MMOL/L (ref 21–32)
CREAT SERPL-MCNC: 1 MG/DL (ref 0.6–1.3)
ERYTHROCYTE [DISTWIDTH] IN BLOOD BY AUTOMATED COUNT: 12.7 % (ref 11.6–15.1)
EST. AVERAGE GLUCOSE BLD GHB EST-MCNC: 157 MG/DL
GFR SERPL CREATININE-BSD FRML MDRD: 79 ML/MIN/1.73SQ M
GLUCOSE P FAST SERPL-MCNC: 314 MG/DL (ref 65–99)
HBA1C MFR BLD: 7.1 %
HCT VFR BLD AUTO: 40.7 % (ref 34.8–46.1)
HDLC SERPL-MCNC: 53 MG/DL
HGB BLD-MCNC: 13.3 G/DL (ref 11.5–15.4)
LDLC SERPL CALC-MCNC: 128 MG/DL (ref 0–100)
MCH RBC QN AUTO: 28.1 PG (ref 26.8–34.3)
MCHC RBC AUTO-ENTMCNC: 32.7 G/DL (ref 31.4–37.4)
MCV RBC AUTO: 86 FL (ref 82–98)
NONHDLC SERPL-MCNC: 146 MG/DL
PLATELET # BLD AUTO: 187 THOUSANDS/UL (ref 149–390)
PMV BLD AUTO: 12.5 FL (ref 8.9–12.7)
POTASSIUM SERPL-SCNC: 4.3 MMOL/L (ref 3.5–5.3)
PROT SERPL-MCNC: 7.7 G/DL (ref 6.4–8.2)
RBC # BLD AUTO: 4.73 MILLION/UL (ref 3.81–5.12)
SODIUM SERPL-SCNC: 136 MMOL/L (ref 136–145)
TRIGL SERPL-MCNC: 88 MG/DL
TSH SERPL DL<=0.05 MIU/L-ACNC: 2.16 UIU/ML (ref 0.36–3.74)
WBC # BLD AUTO: 6.36 THOUSAND/UL (ref 4.31–10.16)

## 2022-01-03 PROCEDURE — 83036 HEMOGLOBIN GLYCOSYLATED A1C: CPT

## 2022-01-03 PROCEDURE — 80053 COMPREHEN METABOLIC PANEL: CPT

## 2022-01-03 PROCEDURE — 80061 LIPID PANEL: CPT

## 2022-01-03 PROCEDURE — 84443 ASSAY THYROID STIM HORMONE: CPT

## 2022-01-03 PROCEDURE — 85027 COMPLETE CBC AUTOMATED: CPT

## 2022-01-03 PROCEDURE — 36415 COLL VENOUS BLD VENIPUNCTURE: CPT

## 2022-01-05 ENCOUNTER — SOCIAL WORK (OUTPATIENT)
Dept: BEHAVIORAL/MENTAL HEALTH CLINIC | Facility: CLINIC | Age: 27
End: 2022-01-05
Payer: COMMERCIAL

## 2022-01-05 DIAGNOSIS — F31.63 BIPOLAR DISORDER, CURRENT EPISODE MIXED, SEVERE, WITHOUT PSYCHOTIC FEATURES (HCC): ICD-10-CM

## 2022-01-05 DIAGNOSIS — F41.0 GENERALIZED ANXIETY DISORDER WITH PANIC ATTACKS: ICD-10-CM

## 2022-01-05 DIAGNOSIS — F41.1 GENERALIZED ANXIETY DISORDER WITH PANIC ATTACKS: ICD-10-CM

## 2022-01-05 DIAGNOSIS — F42.2 MIXED OBSESSIONAL THOUGHTS AND ACTS: Primary | ICD-10-CM

## 2022-01-05 DIAGNOSIS — F43.12 CHRONIC POST-TRAUMATIC STRESS DISORDER (PTSD): ICD-10-CM

## 2022-01-05 PROCEDURE — 90834 PSYTX W PT 45 MINUTES: CPT | Performed by: SOCIAL WORKER
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
## 2022-01-12 ENCOUNTER — TELEMEDICINE (OUTPATIENT)
Dept: BEHAVIORAL/MENTAL HEALTH CLINIC | Facility: CLINIC | Age: 27
End: 2022-01-12
Payer: COMMERCIAL

## 2022-01-12 ENCOUNTER — TELEPHONE (OUTPATIENT)
Dept: SURGERY | Facility: AMBULARY SURGERY CENTER | Age: 27
End: 2022-01-12

## 2022-01-12 DIAGNOSIS — F43.12 CHRONIC POST-TRAUMATIC STRESS DISORDER (PTSD): ICD-10-CM

## 2022-01-12 DIAGNOSIS — F31.63 BIPOLAR DISORDER, CURRENT EPISODE MIXED, SEVERE, WITHOUT PSYCHOTIC FEATURES (HCC): ICD-10-CM

## 2022-01-12 DIAGNOSIS — F41.1 GENERALIZED ANXIETY DISORDER WITH PANIC ATTACKS: ICD-10-CM

## 2022-01-12 DIAGNOSIS — F42.2 MIXED OBSESSIONAL THOUGHTS AND ACTS: Primary | ICD-10-CM

## 2022-01-12 DIAGNOSIS — F41.0 GENERALIZED ANXIETY DISORDER WITH PANIC ATTACKS: ICD-10-CM

## 2022-01-12 PROCEDURE — 90834 PSYTX W PT 45 MINUTES: CPT | Performed by: SOCIAL WORKER

## 2022-01-12 NOTE — PSYCH
Psychotherapy Provided: Individual Psychotherapy 50 minutes     Length of time in session: 50 minutes, follow up in 1 week    Encounter Diagnosis     ICD-10-CM    1  Mixed obsessional thoughts and acts  F42 2    2  Generalized anxiety disorder with panic attacks  F41 1     F41 0    3  Chronic post-traumatic stress disorder (PTSD)  F43 12    4  Bipolar disorder, current episode mixed, severe, without psychotic features ([facility] [address] )  F31 63        Goals addressed in session: Goal 1, Goal 2 and Goal 3      Pain:      moderate to severe    3    Current suicide risk : Low     D: Met with Jenny individually  Scheduled to have EDG tomorrow - r/o stomach issues  Increased Metformin - GI issues already  Sugars are up and down  Jenny's family still not talking to her - processed and role played future discussions when they occur  Damion Mao acknowledged initial conversation hurt her sister's feelings as much as it hurt her  Damion Mao stated she is shopping for engagement rings - able to return the ring within 1 year  'Stressed out' with school starting - 12 credits - sugars already responding  Must go on campus for 1 class  Denied SI    A: Damion Mao presented with appropriate mood and affect  Eager to see results of EDG  P: Continue individual therapy    Behavioral Health Treatment Plan [facility]: Diagnosis and Treatment Plan explained to Mirian Glalegos relates understanding diagnosis and is agreeable to Treatment Plan   Yes

## 2022-01-13 ENCOUNTER — ANESTHESIA (OUTPATIENT)
Dept: GASTROENTEROLOGY | Facility: AMBULARY SURGERY CENTER | Age: 27
End: 2022-01-13

## 2022-01-13 ENCOUNTER — ANESTHESIA EVENT (OUTPATIENT)
Dept: GASTROENTEROLOGY | Facility: AMBULARY SURGERY CENTER | Age: 27
End: 2022-01-13

## 2022-01-13 ENCOUNTER — HOSPITAL ENCOUNTER (OUTPATIENT)
Dept: GASTROENTEROLOGY | Facility: AMBULARY SURGERY CENTER | Age: 27
Setting detail: OUTPATIENT SURGERY
Discharge: HOME | End: 2022-01-13
Attending: INTERNAL MEDICINE
Payer: COMMERCIAL

## 2022-01-13 VITALS
BODY MASS INDEX: 28.52 KG/M2 | RESPIRATION RATE: 18 BRPM | HEIGHT: 62 IN | OXYGEN SATURATION: 97 % | SYSTOLIC BLOOD PRESSURE: 114 MMHG | WEIGHT: 155 LBS | DIASTOLIC BLOOD PRESSURE: 63 MMHG | HEART RATE: 92 BPM | TEMPERATURE: 98 F

## 2022-01-13 DIAGNOSIS — R11.2 NAUSEA AND VOMITING, INTRACTABILITY OF VOMITING NOT SPECIFIED, UNSPECIFIED VOMITING TYPE: ICD-10-CM

## 2022-01-13 PROBLEM — R06.2 WHEEZING: Status: RESOLVED | Noted: 2020-04-21 | Resolved: 2022-01-13

## 2022-01-13 PROBLEM — E87.6 HYPOKALEMIA: Status: RESOLVED | Noted: 2019-01-25 | Resolved: 2022-01-13

## 2022-01-13 PROBLEM — R07.9 CHEST PAIN: Status: RESOLVED | Noted: 2019-09-10 | Resolved: 2022-01-13

## 2022-01-13 PROBLEM — R10.9 ACUTE RIGHT FLANK PAIN: Status: RESOLVED | Noted: 2020-02-21 | Resolved: 2022-01-13

## 2022-01-13 LAB
EXT PREGNANCY TEST URINE: NEGATIVE
EXT. CONTROL: NORMAL

## 2022-01-13 PROCEDURE — 81025 URINE PREGNANCY TEST: CPT | Performed by: INTERNAL MEDICINE

## 2022-01-13 PROCEDURE — 88305 TISSUE EXAM BY PATHOLOGIST: CPT | Performed by: PATHOLOGY

## 2022-01-13 PROCEDURE — 43239 EGD BIOPSY SINGLE/MULTIPLE: CPT | Performed by: INTERNAL MEDICINE

## 2022-01-13 RX ORDER — MIDAZOLAM HYDROCHLORIDE 2 MG/2ML
INJECTION, SOLUTION INTRAMUSCULAR; INTRAVENOUS AS NEEDED
Status: DISCONTINUED | OUTPATIENT
Start: 2022-01-13 | End: 2022-01-13

## 2022-01-13 RX ORDER — LIDOCAINE HYDROCHLORIDE 10 MG/ML
INJECTION, SOLUTION EPIDURAL; INFILTRATION; INTRACAUDAL; PERINEURAL AS NEEDED
Status: DISCONTINUED | OUTPATIENT
Start: 2022-01-13 | End: 2022-01-13

## 2022-01-13 RX ORDER — LIDOCAINE HYDROCHLORIDE 10 MG/ML
0.5 INJECTION, SOLUTION EPIDURAL; INFILTRATION; INTRACAUDAL; PERINEURAL ONCE AS NEEDED
Status: DISCONTINUED | OUTPATIENT
Start: 2022-01-13 | End: 2022-01-17 | Stop reason: HOSPADM

## 2022-01-13 RX ORDER — SODIUM CHLORIDE, SODIUM LACTATE, POTASSIUM CHLORIDE, CALCIUM CHLORIDE 600; 310; 30; 20 MG/100ML; MG/100ML; MG/100ML; MG/100ML
125 INJECTION, SOLUTION INTRAVENOUS CONTINUOUS
Status: DISCONTINUED | OUTPATIENT
Start: 2022-01-13 | End: 2022-01-17 | Stop reason: HOSPADM

## 2022-01-13 RX ORDER — DEXMEDETOMIDINE HYDROCHLORIDE 100 UG/ML
INJECTION, SOLUTION INTRAVENOUS AS NEEDED
Status: DISCONTINUED | OUTPATIENT
Start: 2022-01-13 | End: 2022-01-13

## 2022-01-13 RX ORDER — PANTOPRAZOLE SODIUM 40 MG/1
40 TABLET, DELAYED RELEASE ORAL DAILY
Qty: 90 TABLET | Refills: 0 | Status: SHIPPED | OUTPATIENT
Start: 2022-01-13 | End: 2022-05-16

## 2022-01-13 RX ORDER — SODIUM CHLORIDE, SODIUM LACTATE, POTASSIUM CHLORIDE, CALCIUM CHLORIDE 600; 310; 30; 20 MG/100ML; MG/100ML; MG/100ML; MG/100ML
INJECTION, SOLUTION INTRAVENOUS CONTINUOUS PRN
Status: DISCONTINUED | OUTPATIENT
Start: 2022-01-13 | End: 2022-01-13

## 2022-01-13 RX ORDER — PROPOFOL 10 MG/ML
INJECTION, EMULSION INTRAVENOUS AS NEEDED
Status: DISCONTINUED | OUTPATIENT
Start: 2022-01-13 | End: 2022-01-13

## 2022-01-13 RX ADMIN — LIDOCAINE HYDROCHLORIDE 100 MG: 10 INJECTION, SOLUTION EPIDURAL; INFILTRATION; INTRACAUDAL at 08:07

## 2022-01-13 RX ADMIN — PROPOFOL 50 MG: 10 INJECTION, EMULSION INTRAVENOUS at 08:11

## 2022-01-13 RX ADMIN — DEXMEDETOMIDINE HYDROCHLORIDE 8 MCG: 100 INJECTION, SOLUTION INTRAVENOUS at 08:27

## 2022-01-13 RX ADMIN — PROPOFOL 120 MG: 10 INJECTION, EMULSION INTRAVENOUS at 08:07

## 2022-01-13 RX ADMIN — PROPOFOL 50 MG: 10 INJECTION, EMULSION INTRAVENOUS at 08:13

## 2022-01-13 RX ADMIN — PROPOFOL 80 MG: 10 INJECTION, EMULSION INTRAVENOUS at 08:09

## 2022-01-13 RX ADMIN — SODIUM CHLORIDE, SODIUM LACTATE, POTASSIUM CHLORIDE, AND CALCIUM CHLORIDE: .6; .31; .03; .02 INJECTION, SOLUTION INTRAVENOUS at 07:45

## 2022-01-13 RX ADMIN — MIDAZOLAM HYDROCHLORIDE 2 MG: 1 INJECTION, SOLUTION INTRAMUSCULAR; INTRAVENOUS at 08:22

## 2022-01-13 NOTE — H&P
History and Physical - [facility]  Chrissie Delgado 27 y o  female MRN: [id number]    HPI: Chrissie Delgado is a 27y o  year old female who presents with abdominal pain  Review of Systems    Historical Information   Past Medical History:   Diagnosis Date    Abdominal pain     Anxiety     Anxiety and depression     COVID-19 12/2020    Depression     Eating disorder     history of anorexia/bulemia [id number]    Fracture of fibula     R Salter I   Lillette Crimes disease     Hashimoto's disease 2/21/2020    Head injury     Nasal congestion     PTSD (post-traumatic stress disorder)     Rectal bleed     Seizures (HCC)     Type 1 diabetes ([address] )      Past Surgical History:   Procedure Laterality Date    COLONOSCOPY      KNEE SURGERY Right 07/06/2020    NASAL SEPTOPLASTY W/ TURBINOPLASTY      SINUS SURGERY      SKIN BIOPSY      TURBINOPLASTY N/A 3/22/2021    Procedure: TURBINOPLASTY;  Surgeon: Jey Loya MD;  [facility];  Service: ENT    UPPER GASTROINTESTINAL ENDOSCOPY      WISDOM TOOTH EXTRACTION      WRIST SURGERY      left;  Excision of ganglion     Social History   Social History     Substance and Sexual Activity   Alcohol Use Never     Social History     Substance and Sexual Activity   Drug Use Never     Social History     Tobacco Use   Smoking Status Never Smoker   Smokeless Tobacco Never Used   Tobacco Comment    Tobacco smoke exposure (Father smokes cigars)     Family History   Problem Relation Age of Onset    Hypertension Mother     Migraines Mother         Headache    Diabetes type II Mother     Varicose Veins Mother     Hyperlipidemia Mother     Diabetes Mother     Arthritis Mother     Depression Mother     Cholelithiasis Father     Hypertension Father     Sarcoidosis Father         Liver    Hyperlipidemia Father     Diabetes Father     Coronary artery disease Father     Nephrolithiasis Father     Cirrhosis Father     Alcohol abuse Father     Thyroid disease Sister     Hashimoto's thyroiditis Sister     Cancer Family     Diabetes Family     Hypertension Family     Alcohol abuse Brother        Meds/Allergies     (Not in a hospital admission)      Allergies   Allergen Reactions    Iodine - Food Allergy Throat Swelling    Insulin Glargine Other (See Comments)     Burning and redness under skin (Lantus)       Objective     /69   Pulse 88   Temp 97 7 °F (36 5 °C) (Temporal)   Resp (!) 23   Ht 5' 2" (1 575 m)   Wt 70 3 kg (155 lb)   SpO2 99%   BMI 28 35 kg/m²       PHYSICAL EXAM    Gen: NAD  CV: RRR  CHEST: Clear  ABD: soft, NT/ND  EXT: no edema  Neuro: AAO      ASSESSMENT/PLAN:  This is a 27y o  year old female here for abdominal pain and nausea       PLAN:   Procedure: EGD

## 2022-01-13 NOTE — SEDATION DOCUMENTATION
Upon "recovery, Phase II, patient became combative  Extra staff members needed at bedside to help with safe precautions  Vital signs remained stable, Blood sugar 220  Anesthesia physician and CRNA at bedside  Additional medication administered  Once this phase passed, patient alert and oriented  Tolerating liquids and solid food

## 2022-01-13 NOTE — ANESTHESIA PREPROCEDURE EVALUATION
Procedure:  EGD    Relevant Problems   ANESTHESIA  pt confused, thrashing when wakes up from all anesthetics      CARDIO   (+) Hyperlipidemia   (-) Chest pain   (-) CRISTOBAL (dyspnea on exertion)      ENDO   (+) Diabetes mellitus type 1, uncomplicated, on long term insulin pump (HCC)   (+) Primary hypothyroidism   (+) Uncontrolled type 1 diabetes mellitus with hypoglycemia without coma (HCC)      MUSCULOSKELETAL   (+) Chronic back pain   (+) Left low back pain      NEURO/PSYCH   (+) Chronic abdominal pain   (+) Chronic back pain   (+) Chronic post-traumatic stress disorder (PTSD)   (+) Generalized anxiety disorder with panic attacks   (+) OCD (obsessive compulsive disorder)   (+) Seizures (HCC)      PULMONARY   (-) Shortness of breath   (-) URI (upper respiratory infection)      Other   (+) Bipolar affective disorder ([facility])        Physical Exam    Airway    Mallampati score: I  TM Distance: >3 FB  Neck ROM: full     Dental   No notable dental hx     Cardiovascular      Pulmonary      Other Findings        Anesthesia Plan  ASA Score- 3     Anesthesia Type- IV sedation with anesthesia with ASA Monitors  Additional Monitors:   Airway Plan:           Plan Factors-Exercise tolerance (METS): >4 METS  Chart reviewed  EKG reviewed  Existing labs reviewed  Induction- intravenous  Postoperative Plan-     Informed Consent- Anesthetic plan and risks discussed with patient  I personally reviewed this patient with the CRNA  Discussed and agreed on the Anesthesia Plan with the CRNA  Brittany Fierro

## 2022-01-13 NOTE — ANESTHESIA POSTPROCEDURE EVALUATION
Post-Op Assessment Note    CV Status:  Stable  Pain Score: 0    Pain management: adequate     Mental Status:  Alert and awake   Hydration Status:  Euvolemic   PONV Controlled:  Controlled   Airway Patency:  Patent      Post Op Vitals Reviewed: Yes      Staff: Anesthesiologist, CRNA   Comments: VSS        No complications documented      BP      Temp      Pulse     Resp     SpO2

## 2022-01-13 NOTE — DISCHARGE INSTRUCTIONS
Upper Endoscopy   WHAT YOU NEED TO KNOW:   An upper endoscopy is also called an upper gastrointestinal (GI) endoscopy, or an esophagogastroduodenoscopy (EGD)  It is a procedure to examine the inside of your esophagus, stomach, and duodenum (first part of the small intestine) with a scope  You may feel bloated, gassy, or have some abdominal discomfort after your procedure  Your throat may be sore for 24 to 36 hours  You may burp or pass gas from air that is still inside your body  DISCHARGE INSTRUCTIONS:   Seek care immediately if:    You have sudden, severe abdominal pain   You have problems swallowing   You have a large amount of black, sticky bowel movements or blood in your bowel movements   You have sudden trouble breathing   You feel weak, lightheaded, or faint or your heart beats faster than normal for you  Contact your healthcare provider if:    You have a fever and chills   You have nausea or are vomiting   Your abdomen is bloated or feels full and hard   You have abdominal pain   You have black, sticky bowel movements or blood in your bowel movements   You have not had a bowel movement for 3 days after your procedure   You have rash or hives   You have questions or concerns about your procedure  Activity:    Do not lift, strain, or run for 24 hours after your procedure   Rest after your procedure  You have been given medicine to relax you  Do not drive or make important decisions until the day after your procedure  Return to your normal activity as directed   Relieve gas and discomfort from bloating by lying on your right side with a heating pad on your abdomen  You may need to take short walks to help the gas move out  Eat small meals until bloating is relieved  Follow up with your healthcare provider as directed: Write down your questions so you remember to ask them during your visits       If you take a blood thinner, please review the specific instructions from your endoscopist about when you should resume it  These can be found in the Recommendation and Your Medication list sections of this After Visit Summary  Gastritis   WHAT YOU NEED TO KNOW:   What is gastritis? Gastritis is inflammation or irritation of the lining of your stomach  What increases my risk for gastritis? · Infection with bacteria, a virus, or a parasite    · NSAIDs, aspirin, or steroid medicine    · Use of tobacco products or alcohol    · Trauma such as an injury to your stomach or intestine    · Autoimmune disorders such as diabetes, thyroid disease, or Crohn disease    · Stress    · Age older than 60 years    · Illegal drugs, such as cocaine    What are the signs and symptoms of gastritis? · Stomach pain, burning, or tenderness when you press on it    · Stomach fullness or tightness    · Nausea or vomiting    · Loss of appetite, or feeling full quickly when you eat    · Bad breath    · Fatigue or feeling more tired than usual    · Heartburn    How is gastritis diagnosed? Your healthcare provider will ask about your signs and symptoms and examine you  You may need any of the following:  · Blood tests  may be used to show an infection, dehydration, or anemia (low red blood cell levels)  · A bowel movement sample  may be tested for blood or the germ that may be causing your gastritis  · A breath test  may show if H pylori is causing your gastritis  You will be given a liquid to drink  Then you will breathe into a bag  Your healthcare provider will measure the amount of carbon dioxide in your breath  Extra amounts of carbon dioxide may mean you have an H pylori infection  · An endoscopy  may be used to look for irritation or bleeding in your stomach  Your healthcare provider will use an endoscope (tube with a light and camera on the end) during the procedure  He or she may take a sample from your stomach to be tested      How is gastritis treated? Your symptoms may go away without treatment  Treatment will depend on what is causing your gastritis  Your healthcare provider may recommend changes to the medicines you take  Medicines may be given to help treat a bacterial infection or decrease stomach acid  How can I manage or prevent gastritis? · Do not smoke  Nicotine and other chemicals in cigarettes and cigars can make your symptoms worse and cause lung damage  Ask your healthcare provider for information if you currently smoke and need help to quit  E-cigarettes or smokeless tobacco still contain nicotine  Talk to your healthcare provider before you use these products  · Do not drink alcohol  Alcohol can prevent healing and make your gastritis worse  Talk to your healthcare provider if you need help to stop drinking  · Do not take NSAIDs or aspirin unless directed  These and similar medicines can cause irritation of your stomach lining  If your healthcare provider says it is okay to take NSAIDs, take them with food  · Do not eat foods that cause irritation  Foods such as oranges and salsa can cause burning or pain  Eat a variety of healthy foods  Examples include fruits (not citrus), vegetables, low-fat dairy products, beans, whole-grain breads, and lean meats and fish  Try to eat small meals, and drink water with your meals  Do not eat for at least 3 hours before you go to bed  · Find ways to relax and decrease stress  Stress can increase stomach acid and make gastritis worse  Activities such as yoga, meditation, or listening to music can help you relax  Spend time with friends, or do things you enjoy  Call 911 for any of the following:   · You develop chest pain or shortness of breath  When should I seek immediate care? · You vomit blood  · You have black or bloody bowel movements  · You have severe stomach or back pain  When should I contact my healthcare provider?    · You have a fever     · You have new or worsening symptoms, even after treatment  · You have questions or concerns about your condition or care  CARE AGREEMENT:   You have the right to help plan your care  Learn about your health condition and how it may be treated  Discuss treatment options with your healthcare providers to decide what care you want to receive  You always have the right to refuse treatment  The above information is an  only  It is not intended as medical advice for individual conditions or treatments  Talk to your doctor, nurse or pharmacist before following any medical regimen to see if it is safe and effective for you  © Copyright [employer] 2021 Information is for End User's use only and may not be sold, redistributed or otherwise used for commercial purposes   All illustrations and images included in CareNotes® are the copyrighted property of A D A M , Inc  or [address] [place] [address]

## 2022-01-19 ENCOUNTER — SOCIAL WORK (OUTPATIENT)
Dept: BEHAVIORAL/MENTAL HEALTH CLINIC | Facility: CLINIC | Age: 27
End: 2022-01-19
Payer: COMMERCIAL

## 2022-01-19 DIAGNOSIS — F41.0 GENERALIZED ANXIETY DISORDER WITH PANIC ATTACKS: ICD-10-CM

## 2022-01-19 DIAGNOSIS — F31.63 BIPOLAR DISORDER, CURRENT EPISODE MIXED, SEVERE, WITHOUT PSYCHOTIC FEATURES (HCC): ICD-10-CM

## 2022-01-19 DIAGNOSIS — F43.12 CHRONIC POST-TRAUMATIC STRESS DISORDER (PTSD): ICD-10-CM

## 2022-01-19 DIAGNOSIS — F42.2 MIXED OBSESSIONAL THOUGHTS AND ACTS: Primary | ICD-10-CM

## 2022-01-19 DIAGNOSIS — F41.1 GENERALIZED ANXIETY DISORDER WITH PANIC ATTACKS: ICD-10-CM

## 2022-01-19 PROCEDURE — 90834 PSYTX W PT 45 MINUTES: CPT | Performed by: SOCIAL WORKER

## 2022-01-19 NOTE — PSYCH
Psychotherapy Provided: Individual Psychotherapy 50 minutes     Length of time in session: 50 minutes, follow up in 1 week    Encounter Diagnosis     ICD-10-CM    1  Mixed obsessional thoughts and acts  F42 2    2  Generalized anxiety disorder with panic attacks  F41 1     F41 0    3  Chronic post-traumatic stress disorder (PTSD)  F43 12    4  Bipolar disorder, current episode mixed, severe, without psychotic features ([facility] [address] )  F31 63        Goals addressed in session: Goal 1, Goal 2 and Goal 3      Pain:      moderate to severe    3    Current suicide risk : Low     D: Met with Jenny individually  Received news of intestinal metaplasia  Must get annual EDG to watch precancerous cells  'I can't win'  3 years anniversary of when Aditi Levine was Dx with Type I Diabetes - calls it her 'death day'  School in session - dropped one class due to not being mindful of masking  Additional classes she is comfortable in  Next semester dropped class is offered online along with other requirements  Graduation is 5/6/22  Annita Alexander remains supportive - skeptical of how long this will last - worried he will revert back  Picked out an engagement setting  Processed indeciveness and 'quirks' other's don't see as making sense  Denied SI    A: Aditi Levine presented with more anxious with rapid pressured speech - stressed over biopsy results and perseverating on 'what if's'  Aware of this which is progress  P: Continue individual therapy    Behavioral Health Treatment Plan [facility]: Diagnosis and Treatment Plan explained to Jones Cabrera relates understanding diagnosis and is agreeable to Treatment Plan   Yes

## 2022-01-23 ENCOUNTER — IMMUNIZATIONS (OUTPATIENT)
Dept: FAMILY MEDICINE CLINIC | Facility: HOSPITAL | Age: 27
End: 2022-01-23

## 2022-01-23 DIAGNOSIS — Z23 ENCOUNTER FOR IMMUNIZATION: Primary | ICD-10-CM

## 2022-01-23 PROCEDURE — 91300 COVID-19 PFIZER VACC 0.3 ML: CPT

## 2022-01-23 PROCEDURE — 0001A COVID-19 PFIZER VACC 0.3 ML: CPT

## 2022-01-26 ENCOUNTER — SOCIAL WORK (OUTPATIENT)
Dept: BEHAVIORAL/MENTAL HEALTH CLINIC | Facility: CLINIC | Age: 27
End: 2022-01-26
Payer: COMMERCIAL

## 2022-01-26 DIAGNOSIS — F31.63 BIPOLAR DISORDER, CURRENT EPISODE MIXED, SEVERE, WITHOUT PSYCHOTIC FEATURES (HCC): ICD-10-CM

## 2022-01-26 DIAGNOSIS — F42.2 MIXED OBSESSIONAL THOUGHTS AND ACTS: Primary | ICD-10-CM

## 2022-01-26 DIAGNOSIS — F41.0 GENERALIZED ANXIETY DISORDER WITH PANIC ATTACKS: ICD-10-CM

## 2022-01-26 DIAGNOSIS — F43.12 CHRONIC POST-TRAUMATIC STRESS DISORDER (PTSD): ICD-10-CM

## 2022-01-26 DIAGNOSIS — F41.1 GENERALIZED ANXIETY DISORDER WITH PANIC ATTACKS: ICD-10-CM

## 2022-01-26 PROCEDURE — 90834 PSYTX W PT 45 MINUTES: CPT | Performed by: SOCIAL WORKER
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
## 2022-01-31 ENCOUNTER — TELEPHONE (OUTPATIENT)
Dept: OBGYN CLINIC | Facility: MEDICAL CENTER | Age: 27
End: 2022-01-31

## 2022-01-31 NOTE — TELEPHONE ENCOUNTER
Patient sees Dr Bowman No     Patient is experiencing bad muscle / joint pain, extreme fatigue, she had the booster last Sunday and she has a very bad flare up since the injection  She does have an appointment on 2/16/2022, but she wants a call relating this       # [phone]

## 2022-01-31 NOTE — TELEPHONE ENCOUNTER
Please let her know the booster can be associated with these symptoms and also can cause a flare up  She should start to feel better soon, in the meanwhile she can alternate over the counter Tylenol and ibuprofen to see if this helps  Will discuss further at follow up if she is still symptomatic  Thanks

## 2022-02-02 ENCOUNTER — SOCIAL WORK (OUTPATIENT)
Dept: BEHAVIORAL/MENTAL HEALTH CLINIC | Facility: CLINIC | Age: 27
End: 2022-02-02
Payer: COMMERCIAL

## 2022-02-02 DIAGNOSIS — F41.1 GENERALIZED ANXIETY DISORDER WITH PANIC ATTACKS: ICD-10-CM

## 2022-02-02 DIAGNOSIS — F42.2 MIXED OBSESSIONAL THOUGHTS AND ACTS: Primary | ICD-10-CM

## 2022-02-02 DIAGNOSIS — F41.0 GENERALIZED ANXIETY DISORDER WITH PANIC ATTACKS: ICD-10-CM

## 2022-02-02 DIAGNOSIS — F43.12 CHRONIC POST-TRAUMATIC STRESS DISORDER (PTSD): ICD-10-CM

## 2022-02-02 DIAGNOSIS — F31.63 BIPOLAR DISORDER, CURRENT EPISODE MIXED, SEVERE, WITHOUT PSYCHOTIC FEATURES (HCC): ICD-10-CM

## 2022-02-02 PROCEDURE — 90834 PSYTX W PT 45 MINUTES: CPT | Performed by: SOCIAL WORKER

## 2022-02-02 NOTE — PSYCH
Psychotherapy Provided: Individual Psychotherapy 50 minutes     Length of time in session: 50 minutes, follow up in 1 week    Encounter Diagnosis     ICD-10-CM    1  Mixed obsessional thoughts and acts  F42 2    2  Generalized anxiety disorder with panic attacks  F41 1     F41 0    3  Chronic post-traumatic stress disorder (PTSD)  F43 12    4  Bipolar disorder, current episode mixed, severe, without psychotic features ([facility] [address] )  F31 63        Goals addressed in session: Goal 1, Goal 2 and Goal 3      Pain:      moderate to severe    4    Current suicide risk : Low     D: Met with Jenny individually  Session focused upon ongoing Rheumtology symptoms  Difficulty with chores, cooking etc   [employer] software is automatically taking over some tasks  Yasmanydudley Nupur expressed 'she's not going to get comfortable with that' as she is getting much better at knowing her personal limitations  [employer] [employer] meal for [employer] - he helped as hands began to swell  Bessie Anna is on Friday - 'I'm not a fan'  Feels 'isn't appreciative' of presents or doesn't even want anything  Doesn't want to go out with sister  Denied SI    A: Farzana Espitia presented with appropriate mood and affect for topics of discussion    P: Continue individual therapy    [address]: Diagnosis and Treatment Plan explained to David Bustos relates understanding diagnosis and is agreeable to Treatment Plan   Yes

## 2022-02-03 ENCOUNTER — TELEPHONE (OUTPATIENT)
Dept: PSYCHIATRY | Facility: CLINIC | Age: 27
End: 2022-02-03

## 2022-02-03 NOTE — TELEPHONE ENCOUNTER
LVM  Dr Mini Edwards is out of the office  Pt's appt needs to be rescheduled  Please transfer the call to me

## 2022-02-04 DIAGNOSIS — M35.9 UNDIFFERENTIATED CONNECTIVE TISSUE DISEASE (HCC): ICD-10-CM

## 2022-02-04 RX ORDER — HYDROXYCHLOROQUINE SULFATE 200 MG/1
200 TABLET, FILM COATED ORAL 2 TIMES DAILY WITH MEALS
Qty: 60 TABLET | Refills: 5 | Status: SHIPPED | OUTPATIENT
Start: 2022-02-04 | End: 2022-08-03

## 2022-02-08 ENCOUNTER — TELEPHONE (OUTPATIENT)
Dept: PSYCHIATRY | Facility: CLINIC | Age: 27
End: 2022-02-08

## 2022-02-15 ENCOUNTER — EPISODE CHANGES (OUTPATIENT)
Dept: CASE MANAGEMENT | Facility: OTHER | Age: 27
End: 2022-02-15

## 2022-02-15 NOTE — PROGRESS NOTES
Assessment and Plan:   Ms Andria Ni a 61-year-old female with history significant for type 1 insulin-dependent diabetes mellitus diagnosed in 2019 and Hashimoto's thyroiditis diagnosed in 2020, who presents for follow-up of an undifferentiated connective tissue disorder (diagnosed based on a positive SOHAM 1:80 nuclear, speckled, homogeneous patterns, arthralgias, malar rash/photosensitivity)  She is currently on hydroxychloroquine 200 mg twice daily  # Undifferentiated connective tissue disease  - BODØ presents today for follow-up of an undifferentiated connective tissue disease for which she was started on hydroxychloroquine in July 2021 and states with initiation of the medication she noticed a significant improvement in her fatigue and arthralgias  Unfortunately she did experience a flare up of her symptoms following the COVID booster vaccine in January 2022  Since then there has been a gradual improvement in her symptoms but as she is still feeling unwell I offered her a short course of prednisone at 20 mg once daily for 7 days  She may still continue the Tylenol as needed and will be cautious with NSAID use  She will contact her endocrinologist prior to starting the steroids to see if the insulin regimen needs to be adjusted  - Otherwise she will continue the hydroxychloroquine at 200 mg twice daily  I requested she established with Ophthalmology for her baseline eye exam        Plan:  Diagnoses and all orders for this visit:    Undifferentiated connective tissue disease ([facility] )  -     predniSONE 20 mg tablet; Take 1 tablet (20 mg total) by mouth daily  -     C-reactive protein; Future  -     Sedimentation rate, automated; Future  -     C4 complement; Future  -     C3 complement; Future  -     Anti-DNA antibody, double-stranded;  Future  -     Urinalysis with microscopic  -     Protein / creatinine ratio, urine    Long-term use of Plaquenil    Elevated rheumatoid factor    Hashimoto's disease    Type 1 diabetes mellitus without complication ([facility] )      Activities as tolerated  Exercise: try to maintain a low impact exercise regimen as much as possible  Walk for 30 minutes a day for at least 3 days a week  Continue other medications as prescribed by PCP and other specialists  RTC in 5 months          HPI    INITIAL VISIT NOTE (6/2021):  Ms Dexter Davies a 68-year-old female with history significant for type 1 insulin-dependent diabetes mellitus diagnosed in 2019 and Hashimoto's thyroiditis (elevated thyroid microsomal and thyroglobulin antibodies) diagnosed in 2020, who presents for further evaluation of a positive SOHAM 1:80 nuclear, speckled, homogeneous patterns and rheumatoid factor of 10, in addition to widespread joint pains   She is referred by MARGARET Padilla for a rheumatology consult        Patient reports she started to feel unwell approximately 2 years ago and further evaluation for this led to the diagnosis of type 1 diabetes as well as the Hashimoto's thyroiditis  Maryannroosevelt Miranda has been managing the diabetes with insulin and states for the hypothyroidism as a result of Hashimoto's thyroiditis she was only started on levothyroxine 25 mcg once daily approximately 1 month ago  Casey Morgan has been a conflict between the endocrinologists she has seen in regards to starting the levothyroxine, but due to progressive complaints which the patient and her primary care physician felt was related to hypothyroidism she was finally started on thyroid supplementation 1 month ago  Maryannbeth Miranda has not noticed a change in her symptoms so far and has not had a TSH rechecked yet   The TSH was slightly elevated at 5 24 on 5/1 prior to starting the levothyroxine   She is also scheduled to see a private endocrinologist for another opinion      She reports over the past 1 and half years she has also started to experience joint and muscle pain which has been gradually progressive   She experiences a degree of pain on a daily basis but states that her symptoms can spontaneously flare-up as well as with changes in the weather, especially when it is cold/damp/humid  Jasmyn Titus does feel better with the warmer weather and has also started utilizing a therapy pool which does help with her symptoms  Jasmyn Titus has noticed joint pains to affect her hands occasionally but prominently in her wrists   She will notice pain in her shoulders and hips mostly with manipulation and range of motion   She describes chronic pain that will also affect her knees as well as in her ankles and feet   At times she will notice a muscle pain throughout her upper and lower extremities as well   She has noticed muscle cramps to affect her hands and feet   No significant joint pains in her elbows or low back   She has not noticed any obvious swelling of her wrists but feels like they may be swollen as she can gauge this by her bracelet  Jasmyn Titus has also noticed swelling to affect her ankles   She does experience morning stiffness which affects her diffusely and takes about 30-40 minutes to improve   She tries to avoid Tylenol as this affects her blood glucose monitoring   She has tried ibuprofen for her symptoms which has not helped significantly      Other than the body pain she also describes chronic fatigue, unintentional weight gain and hair thinning   She denies fevers, unintentional weight loss, focal alopecia, dry eyes, dry mouth, inflammatory eye disease, skin rash, psoriasis, photosensitivity, mouth/nose ulcers, swollen glands, pleuritic chest pain, shortness of breath, inflammatory bowel disease, blood clots (reports a history of 1 very early miscarriage), Raynaud's or a family history of autoimmune disease      Testing done for her symptoms showed the positive SOHAM and borderline positive rheumatoid factor   A rheumatoid factor was negative in 2019   An ESR, CRP, anti CCP antibody, CK, Lyme antibody profile, anti neutrophilic cytoplasmic antibodies, Sjogren's antibodies and anti double-stranded DNA antibody were normal   An MRI of her right knee and ultrasound of her right Achilles tendon in 2018 were normal      In view of her complaints she was also evaluated by Neurology to rule out multiple sclerosis and currently there is no specific diagnosis from their perspective   She did have an MRI of her brain done last year which showed a single focus of white matter change which has been stable since 2014 7/13/2021:  Patient presents for a follow-up of a positive SOHAM  I reviewed her workup done following the last office visit which showed an unremarkable SOHAM specificity, C3, C4, antiphospholipid antibody testing, urinalysis, urine protein creatinine ratio, vitamin-D, CK, anti CCP antibody and HLA B27 antigen  X-rays of her hands and feet were unremarkable        She reports there has been no change in her symptoms since the last office visit and she continues to describe the chronic fatigue, muscle aches/spasms as well as ongoing joint pains  She is scheduled for another endocrinology opinion tomorrow to see if any of her symptoms may be related to the underlying hypothyroidism, but this is not felt to be the case so far       2/16/2022:  Patient presents for follow-up of an undifferentiated connective tissue disease  She is currently on hydroxychloroquine 200 mg twice daily that was started in July 2021  She reports in about September she started to notice a significant improvement in her well-being with being on the hydroxychloroquine  There was an improvement in her fatigue and joint pains  She had overall been doing well up until January but after she received the COVID-19 booster vaccination noticed a flare-up in her symptoms with more fatigue as well as significant joint pains which mostly affected her hands, wrists, knees and ankles  She has been noticing intermittent swelling of her fingers as well as ankles/feet    She has been taking Tylenol alternating with ibuprofen but is unsure if the medications are specifically helping her or if the side effects as a result of the vaccination are gradually improving on their own  She was also evaluated by Gastroenterology and had an upper endoscopy done which showed chronic gastritis so she was advised to minimize NSAID use  No recent steroids  Except for the pain she has also had a few occurrences of redness on her face in a butterfly pattern as well as affecting her forehead  She has noticed photosensitivity and states while she was at the beach in August she developed a skin rash in sun-exposed areas  She has been more careful with applying sunscreen as well as covering up in the sun  She denies fevers, weight loss, alopecia, mouth/nose ulcers, swollen glands or pleuritic chest pain  The following portions of the patient's history were reviewed and updated as appropriate: allergies, current medications, past family history, past medical history, past social history, past surgical history and problem list       Review of Systems  Constitutional: Negative for weight change, fevers, chills, night sweats  Positive for fatigue  ENT/Mouth: Negative for hearing changes, ear pain, nasal congestion, sinus pain, hoarseness, sore throat, rhinorrhea, swallowing difficulty  Eyes: Negative for pain, redness, discharge, vision changes  Cardiovascular: Negative for chest pain, SOB, palpitations  Respiratory: Negative for cough, sputum, wheezing, dyspnea  Gastrointestinal: Negative for nausea, vomiting, diarrhea, constipation, pain, heartburn  Genitourinary: Negative for dysuria, urinary frequency, hematuria  Musculoskeletal: As per HPI  Skin: Negative for skin rash, color changes  Neuro: Negative for weakness, numbness, tingling, loss of consciousness  Psych: Negative for anxiety, depression  Heme/Lymph: Negative for easy bruising, bleeding, lymphadenopathy          Past Medical History: Diagnosis Date    Abdominal pain     Anxiety     Anxiety and depression     COVID-19 12/2020    Depression     Eating disorder     history of anorexia/bulemia [id number]    Fracture of fibula     R Salter I   Ruthe Cover disease     Hashimoto's disease 2/21/2020    Head injury     Nasal congestion     PTSD (post-traumatic stress disorder)     Rectal bleed     Seizures (HCC)     Type 1 diabetes (HCC)        Past Surgical History:   Procedure Laterality Date    COLONOSCOPY      KNEE SURGERY Right 07/06/2020    NASAL SEPTOPLASTY W/ TURBINOPLASTY      SINUS SURGERY      SKIN BIOPSY      TURBINOPLASTY N/A 3/22/2021    Procedure: TURBINOPLASTY;  Surgeon: Kathy Edouard MD;  [facility]: [facility];  Service: ENT    UPPER GASTROINTESTINAL ENDOSCOPY      WISDOM TOOTH EXTRACTION      WRIST SURGERY      left;  Excision of ganglion       Social History     Socioeconomic History    Marital status: Single     Spouse name: Not on file    Number of children: 0    Years of education: Not on file    Highest education level: Associate degree: academic program   Occupational History    Occupation: unemployed   Tobacco Use    Smoking status: Never Smoker    Smokeless tobacco: Never Used    Tobacco comment: Tobacco smoke exposure (Father smokes cigars)   Vaping Use    Vaping Use: Never used   Substance and Sexual Activity    Alcohol use: Never    Drug use: Never    Sexual activity: Yes     Partners: Male     Birth control/protection: Condom Male   Other Topics Concern    Not on file   Social History Narrative    Student at [employer] a poor diet; low in vegetables, high in sweets    Dental care, regularly    Lives with parents    Sleeps 8-10 hours a day     Social Determinants of Health     Financial Resource Strain: Medium Risk    Difficulty of Paying Living Expenses: Somewhat hard   Food Insecurity: Not on file   Transportation Needs: Not on file   Physical Activity: Not on file   Stress: Not on file   Social Connections: Not on file   Intimate Partner Violence: Not on file   Housing Stability: Not on file       Family History   Problem Relation Age of Onset    Hypertension Mother    Tomie Coop Migraines Mother         Headache    Diabetes type II Mother     Varicose Veins Mother     Hyperlipidemia Mother    Tomie Coop Diabetes Mother    Tomie Coop Arthritis Mother     Depression Mother     Cholelithiasis Father     Hypertension Father     Sarcoidosis Father         Liver    Hyperlipidemia Father     Diabetes Father     Coronary artery disease Father     Nephrolithiasis Father     Cirrhosis Father     Alcohol abuse Father     Thyroid disease Sister     Hashimoto's thyroiditis Sister     Cancer Family     Diabetes Family     Hypertension Family     Alcohol abuse Brother        Allergies   Allergen Reactions    Iodine - Food Allergy Throat Swelling    Insulin Glargine Other (See Comments)     Burning and redness under skin (Lantus)       Current Outpatient Medications:     Ciclopirox 1 % shampoo, , Disp: , Rfl:     clonazePAM (KlonoPIN) 0 5 mg tablet, Take 1 tablet (0 5 mg total) by mouth 2 (two) times a day, Disp: 45 tablet, Rfl: 2    Diclofenac Sodium (VOLTAREN) 1 %, Apply 2 g topically 4 (four) times a day, Disp: 100 g, Rfl: 0    divalproex sodium (DEPAKOTE ER) 250 mg 24 hr tablet, TAKE 1 TABLET (250 MG TOTAL) BY MOUTH DAILY AT BEDTIME, Disp: 90 tablet, Rfl: 1    divalproex sodium (DEPAKOTE ER) 500 mg 24 hr tablet, Take 1 tablet (500 mg total) by mouth daily, Disp: 30 tablet, Rfl: 2    Glucagon 1 MG/0 2ML SOAJ, Inject 1 mg under the skin, Disp: , Rfl:     hydroxychloroquine (PLAQUENIL) 200 mg tablet, TAKE 1 TABLET (200 MG TOTAL) BY MOUTH 2 (TWO) TIMES A DAY WITH MEALS, Disp: 60 tablet, Rfl: 5    insulin aspart (NovoLOG) 100 units/mL injection, Inject  Units under the skin, Disp: , Rfl:     levonorgestrel-ethinyl estradiol (Altavera) 0 15-30 MG-MCG per tablet, Take 1 tablet by mouth daily, Disp: 84 tablet, Rfl: 2    levothyroxine 25 mcg tablet, Take 1 tablet (25 mcg total) by mouth daily, Disp: 60 tablet, Rfl: 0    lurasidone (Latuda) 20 mg tablet, Take 1 tablet (20 mg total) by mouth daily with breakfast, Disp: 30 tablet, Rfl: 2    magnesium oxide (MAG-OX) 400 mg, Take 1 tablet (400 mg total) by mouth daily, Disp: 90 tablet, Rfl: 0    metFORMIN (GLUCOPHAGE-XR) 500 mg 24 hr tablet, Take 1 tab by mouth twice daily with food, Disp: , Rfl:     mometasone (ELOCON) 0 1 % lotion,  , Disp: , Rfl:     pantoprazole (PROTONIX) 40 mg tablet, Take 1 tablet (40 mg total) by mouth daily, Disp: 90 tablet, Rfl: 0    promethazine (PHENERGAN) 12 5 MG tablet, Take 1 tablet (12 5 mg total) by mouth every 6 (six) hours as needed for nausea or vomiting, Disp: 30 tablet, Rfl: 0    tretinoin (RETIN-A) 0 025 % cream, , Disp: , Rfl:     EPINEPHrine (EPIPEN) 0 3 mg/0 3 mL SOAJ, Inject 0 3 mL (0 3 mg total) into a muscle once for 1 dose, Disp: 0 6 mL, Rfl: 0    ibuprofen (MOTRIN) 400 mg tablet, Take 1 tablet (400 mg total) by mouth every 6 (six) hours as needed for mild pain for up to 3 days, Disp: 12 tablet, Rfl: 0    Insulin Pen Needle (BD PEN NEEDLE NASIM U/F) 32G X 4 MM MISC, by Does not apply route 4 (four) times a day for 180 days, Disp: 400 each, Rfl: 3    pantoprazole (PROTONIX) 40 mg tablet, Take 1 tablet (40 mg total) by mouth daily, Disp: 90 tablet, Rfl: 0    predniSONE 20 mg tablet, Take 1 tablet (20 mg total) by mouth daily, Disp: 7 tablet, Rfl: 0      Objective:    Vitals:    02/16/22 1124   BP: 118/70   Weight: 70 3 kg (155 lb)   Height: 5' 2" (1 575 m)       Physical Exam  General: Well appearing, well nourished, in no distress  Oriented x 3, normal mood and affect  Ambulating without difficulty  Skin: Good turgor, no rash today, unusual bruising or prominent lesions  Hair: Normal texture and distribution  Nails: Normal color, no deformities  HEENT:  Head: Normocephalic, atraumatic    Eyes: Conjunctiva clear, sclera non-icteric, EOM intact  Extremities: No amputations or deformities, cyanosis, edema  Musculoskeletal:  No swelling visualized currently  Neurologic: Alert and oriented  No focal neurological deficits appreciated  Psychiatric: Normal mood and affect  DEVONTE Brasher    Rheumatology

## 2022-02-16 ENCOUNTER — SOCIAL WORK (OUTPATIENT)
Dept: BEHAVIORAL/MENTAL HEALTH CLINIC | Facility: CLINIC | Age: 27
End: 2022-02-16
Payer: COMMERCIAL

## 2022-02-16 ENCOUNTER — PATIENT OUTREACH (OUTPATIENT)
Dept: INTERNAL MEDICINE CLINIC | Facility: CLINIC | Age: 27
End: 2022-02-16

## 2022-02-16 ENCOUNTER — APPOINTMENT (OUTPATIENT)
Dept: LAB | Facility: CLINIC | Age: 27
End: 2022-02-16
Payer: COMMERCIAL

## 2022-02-16 ENCOUNTER — OFFICE VISIT (OUTPATIENT)
Dept: RHEUMATOLOGY | Facility: CLINIC | Age: 27
End: 2022-02-16
Payer: COMMERCIAL

## 2022-02-16 VITALS
WEIGHT: 155 LBS | HEIGHT: 62 IN | BODY MASS INDEX: 28.52 KG/M2 | SYSTOLIC BLOOD PRESSURE: 118 MMHG | DIASTOLIC BLOOD PRESSURE: 70 MMHG

## 2022-02-16 DIAGNOSIS — Z79.899 LONG-TERM USE OF PLAQUENIL: ICD-10-CM

## 2022-02-16 DIAGNOSIS — R76.8 ELEVATED RHEUMATOID FACTOR: ICD-10-CM

## 2022-02-16 DIAGNOSIS — E10.9 TYPE 1 DIABETES MELLITUS WITHOUT COMPLICATION (HCC): ICD-10-CM

## 2022-02-16 DIAGNOSIS — M35.9 UNDIFFERENTIATED CONNECTIVE TISSUE DISEASE (HCC): ICD-10-CM

## 2022-02-16 DIAGNOSIS — F43.12 CHRONIC POST-TRAUMATIC STRESS DISORDER (PTSD): ICD-10-CM

## 2022-02-16 DIAGNOSIS — F41.1 GENERALIZED ANXIETY DISORDER WITH PANIC ATTACKS: ICD-10-CM

## 2022-02-16 DIAGNOSIS — F31.63 BIPOLAR DISORDER, CURRENT EPISODE MIXED, SEVERE, WITHOUT PSYCHOTIC FEATURES (HCC): ICD-10-CM

## 2022-02-16 DIAGNOSIS — E06.3 HASHIMOTO'S DISEASE: ICD-10-CM

## 2022-02-16 DIAGNOSIS — F41.0 GENERALIZED ANXIETY DISORDER WITH PANIC ATTACKS: ICD-10-CM

## 2022-02-16 DIAGNOSIS — F42.2 MIXED OBSESSIONAL THOUGHTS AND ACTS: Primary | ICD-10-CM

## 2022-02-16 DIAGNOSIS — M35.9 UNDIFFERENTIATED CONNECTIVE TISSUE DISEASE (HCC): Primary | ICD-10-CM

## 2022-02-16 LAB
AMORPH URATE CRY URNS QL MICRO: ABNORMAL /HPF
BACTERIA UR QL AUTO: ABNORMAL /HPF
BILIRUB UR QL STRIP: NEGATIVE
C3 SERPL-MCNC: 115 MG/DL (ref 90–180)
C4 SERPL-MCNC: 21 MG/DL (ref 10–40)
CLARITY UR: CLEAR
COLOR UR: YELLOW
CREAT UR-MCNC: 205 MG/DL
CRP SERPL QL: <3 MG/L
ERYTHROCYTE [SEDIMENTATION RATE] IN BLOOD: 14 MM/HOUR (ref 0–19)
GLUCOSE UR STRIP-MCNC: NEGATIVE MG/DL
HGB UR QL STRIP.AUTO: ABNORMAL
KETONES UR STRIP-MCNC: NEGATIVE MG/DL
LEUKOCYTE ESTERASE UR QL STRIP: ABNORMAL
MUCOUS THREADS UR QL AUTO: ABNORMAL
NITRITE UR QL STRIP: NEGATIVE
NON-SQ EPI CELLS URNS QL MICRO: ABNORMAL /HPF
PH UR STRIP.AUTO: 5.5 [PH]
PROT UR STRIP-MCNC: NEGATIVE MG/DL
PROT UR-MCNC: 12 MG/DL
PROT/CREAT UR: 0.06 MG/G{CREAT} (ref 0–0.1)
RBC #/AREA URNS AUTO: ABNORMAL /HPF
SP GR UR STRIP.AUTO: >=1.03 (ref 1–1.03)
URATE CRY URNS QL MICRO: ABNORMAL /HPF
UROBILINOGEN UR QL STRIP.AUTO: 0.2 E.U./DL
WBC #/AREA URNS AUTO: ABNORMAL /HPF

## 2022-02-16 PROCEDURE — 86160 COMPLEMENT ANTIGEN: CPT

## 2022-02-16 PROCEDURE — 36415 COLL VENOUS BLD VENIPUNCTURE: CPT

## 2022-02-16 PROCEDURE — 84156 ASSAY OF PROTEIN URINE: CPT | Performed by: INTERNAL MEDICINE

## 2022-02-16 PROCEDURE — 82570 ASSAY OF URINE CREATININE: CPT | Performed by: INTERNAL MEDICINE

## 2022-02-16 PROCEDURE — 86140 C-REACTIVE PROTEIN: CPT

## 2022-02-16 PROCEDURE — 99215 OFFICE O/P EST HI 40 MIN: CPT | Performed by: INTERNAL MEDICINE

## 2022-02-16 PROCEDURE — 86225 DNA ANTIBODY NATIVE: CPT

## 2022-02-16 PROCEDURE — 3061F NEG MICROALBUMINURIA REV: CPT | Performed by: NURSE PRACTITIONER

## 2022-02-16 PROCEDURE — 90834 PSYTX W PT 45 MINUTES: CPT | Performed by: SOCIAL WORKER

## 2022-02-16 PROCEDURE — 85652 RBC SED RATE AUTOMATED: CPT

## 2022-02-16 PROCEDURE — 81001 URINALYSIS AUTO W/SCOPE: CPT | Performed by: INTERNAL MEDICINE

## 2022-02-16 RX ORDER — PREDNISONE 20 MG/1
20 TABLET ORAL DAILY
Qty: 7 TABLET | Refills: 0 | Status: SHIPPED | OUTPATIENT
Start: 2022-02-16 | End: 2022-05-16

## 2022-02-16 NOTE — PSYCH
Psychotherapy Provided: Individual Psychotherapy 50 minutes     Length of time in session: 50 minutes, follow up in 1 week    Encounter Diagnosis     ICD-10-CM    1  Mixed obsessional thoughts and acts  F42 2    2  Generalized anxiety disorder with panic attacks  F41 1     F41 0    3  Chronic post-traumatic stress disorder (PTSD)  F43 12    4  Bipolar disorder, current episode mixed, severe, without psychotic features ([facility] )  F31 63        Goals addressed in session: Goal 1, Goal 2 and Goal 3      Pain:      moderate to severe    4    Current suicide risk : Low     D: Met with Jenny individually  "I'm not doing great the last few days '  Stated Rhuemo Dx with Lupus  "I wanted to dx to see I wasn't crazy but not it's hitting me '  On steroids which is raising sugars - states feels as though she will be in DKA by weekend  Rosanne Brandt acknowledged competition between her and sister - both are materialistic - comparing weddings, rings, honey moons and cars  Consideration involving Disability, medical coverage and increased medical bills Kristal Rodriguez gets   Denied SI    A: Rosanne Brandt presented tearful with depressed mood - appropriate for topics of discussion today  Many changes and decisions to be made over the year  P: Continue individual therapy    Behavioral Health Treatment Plan [facility]: Diagnosis and Treatment Plan explained to Jabier Severe relates understanding diagnosis and is agreeable to Treatment Plan   Yes

## 2022-02-16 NOTE — PROGRESS NOTES
Chart review completed for the following sections:   Recent Vital Signs   Allergies/Contradictions   Medication Review    History    SDOH    Problem List   Immunizations   Past hospitalizations and major procedures, including surgery   Significant past illnesses and treatment history including: Other provider notes, Psychiatry notes   Relevant past medications related to the patient's condition    Spoke with patient and discussed Complex Care Management Program   Patient unsure if enrollment in this program would be beneficial at this time  Patient is currently working with multiple providers/specialists who communicate effectively with one another  Patient is awaiting results/answers from these providers and is working closely with them  Provided contact information for OP [facility] and encouraged patient to outreach at later date if she would like to enroll in Complex Care Management Program   Patient does not consent to the [facility] at this time but would like to be contacted at a later date

## 2022-02-17 LAB — DSDNA AB SER-ACNC: <1 IU/ML (ref 0–9)

## 2022-02-22 ENCOUNTER — OFFICE VISIT (OUTPATIENT)
Dept: INTERNAL MEDICINE CLINIC | Facility: CLINIC | Age: 27
End: 2022-02-22
Payer: COMMERCIAL

## 2022-02-22 VITALS
BODY MASS INDEX: 28.34 KG/M2 | RESPIRATION RATE: 16 BRPM | TEMPERATURE: 97.1 F | HEART RATE: 90 BPM | OXYGEN SATURATION: 99 % | DIASTOLIC BLOOD PRESSURE: 90 MMHG | WEIGHT: 154 LBS | SYSTOLIC BLOOD PRESSURE: 130 MMHG | HEIGHT: 62 IN

## 2022-02-22 DIAGNOSIS — L60.9 NAIL ABNORMALITY: ICD-10-CM

## 2022-02-22 DIAGNOSIS — R07.89 OTHER CHEST PAIN: Primary | ICD-10-CM

## 2022-02-22 PROCEDURE — 99214 OFFICE O/P EST MOD 30 MIN: CPT | Performed by: NURSE PRACTITIONER

## 2022-02-22 PROCEDURE — 1036F TOBACCO NON-USER: CPT | Performed by: NURSE PRACTITIONER

## 2022-02-22 PROCEDURE — 3008F BODY MASS INDEX DOCD: CPT | Performed by: NURSE PRACTITIONER

## 2022-02-22 PROCEDURE — 93000 ELECTROCARDIOGRAM COMPLETE: CPT | Performed by: NURSE PRACTITIONER

## 2022-02-22 NOTE — PROGRESS NOTES
Assessment/Plan:    Other chest pain  See HPI for description, sx started following covid booster about 3 weeks ago, not associated with exertion or PO intake  EKG unremarkable  With onset following booster, would like to get echo to assess for estephania or pericarditis and will get a d-dimer  Referral to cards for additional evaluation  Will also have her increase pantoprazole to bid dosing  Will f/u pending results  Nail abnormality  Bumpy appearance of left great toenail likely 2/2 trauma  No other abnormal findings on exam   She is having issues with keeping the nail trimmed well and toe rolling out  She should consult with her podiatrist        Diagnoses and all orders for this visit:    Other chest pain  -     POCT ECG  -     Echo complete w/ contrast if indicated; Future  -     D-dimer, quantitative; Future  -     Ambulatory Referral to Cardiology; Future    Nail abnormality          Subjective:      Patient ID: Chrissie Delgado is a 24 y o  female  Pt is a 24 y o  y/o female who is seen today for evaluation of chest pain  PMH of DM I, hashimoto's disease, neuropathy, bipolar affective dissorder, depression/anxiety, unspecified connective tissue disorder  She started with symptoms about 2-3 weeks ago, notes that she had her covid booster just prior to onset of episodes  She also notes that following the booster all of her joints became inflamed with pain and swelling, she was started on prednisone for the flair on 2/20  She states chest pain symptoms come and go 1-2 times per day lasting about 30 seconds or so  She describes pain as sharp, below the sternum and it causes a tightening sensation in her chest that makes it hard to breathe  Pain radiates slightly to the R chest, never to the arms, back, or abdomen  No identifiable trigger  She does not experience any symptoms of increased anxiety prior to episodes, no symptoms of reflux or heart burn prior to or during episodes    Her appetite has been consistent with her baseline  PO intake not attributed to symptoms, she denies nausea or vomiting  Her sugars continue to fluctuate, but she has not had extreme highs or lows  She also c/o pain on the left great toe nail  Last summer there was bruising under the nail, which has since healed  The toenail now has a rippled texture, she states that her toe rolls outward when she puts weight on her foot and it causes the nail to hit her footwear in a painful way  The following portions of the patient's history were reviewed and updated as appropriate: allergies, current medications, past family history, past medical history, past social history, past surgical history and problem list     Review of Systems   Constitutional: Negative for appetite change, chills and fever  Respiratory: Positive for chest tightness and shortness of breath (during episodes)  Cardiovascular: Positive for chest pain  Negative for palpitations and leg swelling  Musculoskeletal: Positive for arthralgias and joint swelling  Objective:      /90 (BP Location: Right arm, Patient Position: Sitting)   Pulse 90   Temp (!) 97 1 °F (36 2 °C)   Resp 16   Ht 5' 2" (1 575 m)   Wt 69 9 kg (154 lb)   SpO2 99%   BMI 28 17 kg/m²          Physical Exam  Vitals reviewed  Constitutional:       General: She is awake  She is not in acute distress  Appearance: Normal appearance  She is well-developed and well-groomed  She is not ill-appearing  Eyes:      General: Lids are normal       Conjunctiva/sclera: Conjunctivae normal    Neck:      Vascular: Normal carotid pulses  No carotid bruit or JVD  Cardiovascular:      Rate and Rhythm: Normal rate and regular rhythm  Pulses: Normal pulses  Heart sounds: Normal heart sounds  No murmur heard  Pulmonary:      Effort: Pulmonary effort is normal       Breath sounds: Normal breath sounds  Abdominal:      General: Abdomen is flat        Palpations: Abdomen is soft  Tenderness: There is no abdominal tenderness  Musculoskeletal:         General: Normal range of motion  Right lower leg: No edema  Left lower leg: No edema  Skin:     General: Skin is warm and dry  Neurological:      Mental Status: She is alert and oriented to person, place, and time  Psychiatric:         Attention and Perception: Attention normal          Mood and Affect: Mood normal          Speech: Speech normal          Behavior: Behavior normal  Behavior is cooperative  Thought Content:  Thought content normal          Cognition and Memory: Cognition normal          Judgment: Judgment normal

## 2022-02-22 NOTE — ASSESSMENT & PLAN NOTE
See HPI for description, sx started following covid booster about 3 weeks ago, not associated with exertion or PO intake  EKG unremarkable  With onset following booster, would like to get echo to assess for estephania or pericarditis and will get a d-dimer  Referral to cards for additional evaluation  Will also have her increase pantoprazole to bid dosing  Will f/u pending results

## 2022-02-22 NOTE — ASSESSMENT & PLAN NOTE
Bumpy appearance of left great toenail likely 2/2 trauma  No other abnormal findings on exam   She is having issues with keeping the nail trimmed well and toe rolling out    She should consult with her podiatrist

## 2022-02-23 ENCOUNTER — SOCIAL WORK (OUTPATIENT)
Dept: BEHAVIORAL/MENTAL HEALTH CLINIC | Facility: CLINIC | Age: 27
End: 2022-02-23
Payer: COMMERCIAL

## 2022-02-23 ENCOUNTER — APPOINTMENT (OUTPATIENT)
Dept: LAB | Age: 27
End: 2022-02-23
Payer: COMMERCIAL

## 2022-02-23 ENCOUNTER — APPOINTMENT (OUTPATIENT)
Dept: LAB | Facility: CLINIC | Age: 27
End: 2022-02-23
Payer: COMMERCIAL

## 2022-02-23 DIAGNOSIS — R07.89 OTHER CHEST PAIN: ICD-10-CM

## 2022-02-23 DIAGNOSIS — F42.2 MIXED OBSESSIONAL THOUGHTS AND ACTS: Primary | ICD-10-CM

## 2022-02-23 DIAGNOSIS — F31.63 BIPOLAR DISORDER, CURRENT EPISODE MIXED, SEVERE, WITHOUT PSYCHOTIC FEATURES (HCC): ICD-10-CM

## 2022-02-23 DIAGNOSIS — F41.1 GENERALIZED ANXIETY DISORDER WITH PANIC ATTACKS: ICD-10-CM

## 2022-02-23 DIAGNOSIS — F43.12 CHRONIC POST-TRAUMATIC STRESS DISORDER (PTSD): ICD-10-CM

## 2022-02-23 DIAGNOSIS — F41.0 GENERALIZED ANXIETY DISORDER WITH PANIC ATTACKS: ICD-10-CM

## 2022-02-23 LAB — D DIMER PPP FEU-MCNC: 0.35 UG/ML FEU

## 2022-02-23 PROCEDURE — 36415 COLL VENOUS BLD VENIPUNCTURE: CPT

## 2022-02-23 PROCEDURE — 90834 PSYTX W PT 45 MINUTES: CPT | Performed by: SOCIAL WORKER

## 2022-02-23 PROCEDURE — 85379 FIBRIN DEGRADATION QUANT: CPT

## 2022-02-23 NOTE — PSYCH
Psychotherapy Provided: Individual Psychotherapy 50 minutes     Length of time in session: 50  minutes, follow up in 1 week    Encounter Diagnosis     ICD-10-CM    1  Mixed obsessional thoughts and acts  F42 2    2  Generalized anxiety disorder with panic attacks  F41 1     F41 0    3  Chronic post-traumatic stress disorder (PTSD)  F43 12    4  Bipolar disorder, current episode mixed, severe, without psychotic features ([facility] [address] )  F31 63        Goals addressed in session: Goal 1, Goal 2 and Goal 3      Pain:      moderate to severe    4    Current suicide risk : Low     D: Met with Jenny individually  Session focused upon multiple medical concerns remaining  Still doesn't have insulin pump  Insurance seems to be getting conflicting info from company  Dental pain has increased  SS sent a letter Carey Gardiner must return all back pay 'they over caculated'  Will have to talk to   Will be moving to [facility] in Summer as Jude Carney accepted program at [facility]  Denied SI    A: Carey Gardiner presented depressed with constricted affect  Multiple stressors as stated above  In addition, Jenny's family doesn't know she's moving yet  P: Continue individual therapy    Behavioral Health Treatment Plan [facility]: Diagnosis and Treatment Plan explained to Shan Dorsey relates understanding diagnosis and is agreeable to Treatment Plan   Yes

## 2022-02-24 ENCOUNTER — TELEPHONE (OUTPATIENT)
Dept: OBGYN CLINIC | Facility: HOSPITAL | Age: 27
End: 2022-02-24

## 2022-03-02 ENCOUNTER — SOCIAL WORK (OUTPATIENT)
Dept: BEHAVIORAL/MENTAL HEALTH CLINIC | Facility: CLINIC | Age: 27
End: 2022-03-02
Payer: COMMERCIAL

## 2022-03-02 DIAGNOSIS — F31.63 BIPOLAR DISORDER, CURRENT EPISODE MIXED, SEVERE, WITHOUT PSYCHOTIC FEATURES (HCC): ICD-10-CM

## 2022-03-02 DIAGNOSIS — F42.2 MIXED OBSESSIONAL THOUGHTS AND ACTS: Primary | ICD-10-CM

## 2022-03-02 DIAGNOSIS — F41.1 GENERALIZED ANXIETY DISORDER WITH PANIC ATTACKS: ICD-10-CM

## 2022-03-02 DIAGNOSIS — F41.0 GENERALIZED ANXIETY DISORDER WITH PANIC ATTACKS: ICD-10-CM

## 2022-03-02 DIAGNOSIS — F43.12 CHRONIC POST-TRAUMATIC STRESS DISORDER (PTSD): ICD-10-CM

## 2022-03-02 PROCEDURE — 90834 PSYTX W PT 45 MINUTES: CPT | Performed by: SOCIAL WORKER

## 2022-03-02 NOTE — PSYCH
Psychotherapy Provided: Individual Psychotherapy 50 minutes     Length of time in session: 50 minutes, follow up in 1 week    No diagnosis found  Goals addressed in session: Goal 1, Goal 2 and Goal 3      Pain:      moderate to severe    4    Current suicide risk : Low     D: Kathy Walters met individually for session  Kathy Walters states she has fixed issue with Disability 'book keeping error'  Discussed Zohra Peace lying to her over the past 2 months regarding vaping (or telling her he stopped)  Both said hurtful things and for a few days though of ending the engagement  Both were able to come together and process  Health struggles remain  Family relationships are tense right now - Kathy Frankn found all family members caught COVID from bridal show  They haven't been speaking due to Kathy Walters not feeling comfortable with going at that time of Pandemic  Kathy Walters found out by accident - mom insisted sister 'swore her to secrecy'  Denied SI    A: Kathy Walters presented with appropriate mood and affect  Kathy Walters is processing emotions well and continues to work towards completing school  P: Continue individual therapy    Behavioral Health Treatment Plan [facility]: Diagnosis and Treatment Plan explained to Chang Morales relates understanding diagnosis and is agreeable to Treatment Plan   Yes

## 2022-03-04 ENCOUNTER — HOSPITAL ENCOUNTER (OUTPATIENT)
Dept: NON INVASIVE DIAGNOSTICS | Facility: CLINIC | Age: 27
Discharge: HOME | End: 2022-03-04
Payer: COMMERCIAL

## 2022-03-04 VITALS
SYSTOLIC BLOOD PRESSURE: 130 MMHG | HEART RATE: 75 BPM | DIASTOLIC BLOOD PRESSURE: 90 MMHG | BODY MASS INDEX: 28.34 KG/M2 | HEIGHT: 62 IN | WEIGHT: 154 LBS

## 2022-03-04 DIAGNOSIS — R07.89 OTHER CHEST PAIN: ICD-10-CM

## 2022-03-04 LAB
AORTIC ROOT: 2 CM
APICAL FOUR CHAMBER EJECTION FRACTION: 53 %
E WAVE DECELERATION TIME: 134 MS
FRACTIONAL SHORTENING: 32 % (ref 28–44)
INTERVENTRICULAR SEPTUM IN DIASTOLE (PARASTERNAL SHORT AXIS VIEW): 0.6 CM
INTERVENTRICULAR SEPTUM: 0.6 CM (ref 0.51–0.95)
LAAS-AP2: 12.4 CM2
LAAS-AP4: 12.5 CM2
LEFT ATRIUM SIZE: 2.6 CM
LEFT INTERNAL DIMENSION IN SYSTOLE: 3.2 CM (ref 2.46–3.72)
LEFT VENTRICULAR INTERNAL DIMENSION IN DIASTOLE: 4.7 CM (ref 4.01–5.97)
LEFT VENTRICULAR POSTERIOR WALL IN END DIASTOLE: 0.7 CM (ref 0.5–0.94)
LEFT VENTRICULAR STROKE VOLUME: 60 ML
LVSV (TEICH): 60 ML
MV E'TISSUE VEL-SEP: 11 CM/S
MV PEAK A VEL: 0.49 M/S
MV PEAK E VEL: 58 CM/S
MV STENOSIS PRESSURE HALF TIME: 39 MS
MV VALVE AREA P 1/2 METHOD: 5.64 CM2
RIGHT ATRIUM AREA SYSTOLE A4C: 11.9 CM2
RIGHT VENTRICLE ID DIMENSION: 3.1 CM
SL CV LEFT ATRIUM LENGTH A2C: 4.6 CM
SL CV LV EF: 55
SL CV PED ECHO LEFT VENTRICLE DIASTOLIC VOLUME (MOD BIPLANE) 2D: 102 ML
SL CV PED ECHO LEFT VENTRICLE SYSTOLIC VOLUME (MOD BIPLANE) 2D: 42 ML
TR MAX PG: 14 MMHG
TR PEAK VELOCITY: 1.9 M/S
TRICUSPID VALVE PEAK REGURGITATION VELOCITY: 1.85 M/S
Z-SCORE OF INTERVENTRICULAR SEPTUM IN END DIASTOLE: -1.14
Z-SCORE OF LEFT VENTRICULAR DIMENSION IN END DIASTOLE: -0.4
Z-SCORE OF LEFT VENTRICULAR DIMENSION IN END SYSTOLE: 0.44
Z-SCORE OF LEFT VENTRICULAR POSTERIOR WALL IN END DIASTOLE: -0.14

## 2022-03-04 PROCEDURE — 93306 TTE W/DOPPLER COMPLETE: CPT | Performed by: INTERNAL MEDICINE

## 2022-03-04 PROCEDURE — 93306 TTE W/DOPPLER COMPLETE: CPT

## 2022-03-16 ENCOUNTER — SOCIAL WORK (OUTPATIENT)
Dept: BEHAVIORAL/MENTAL HEALTH CLINIC | Facility: CLINIC | Age: 27
End: 2022-03-16
Payer: COMMERCIAL

## 2022-03-16 DIAGNOSIS — F43.12 CHRONIC POST-TRAUMATIC STRESS DISORDER (PTSD): ICD-10-CM

## 2022-03-16 DIAGNOSIS — F41.1 GENERALIZED ANXIETY DISORDER WITH PANIC ATTACKS: ICD-10-CM

## 2022-03-16 DIAGNOSIS — F42.2 MIXED OBSESSIONAL THOUGHTS AND ACTS: Primary | ICD-10-CM

## 2022-03-16 DIAGNOSIS — F41.0 GENERALIZED ANXIETY DISORDER WITH PANIC ATTACKS: ICD-10-CM

## 2022-03-16 DIAGNOSIS — F31.63 BIPOLAR DISORDER, CURRENT EPISODE MIXED, SEVERE, WITHOUT PSYCHOTIC FEATURES (HCC): ICD-10-CM

## 2022-03-16 PROCEDURE — 90834 PSYTX W PT 45 MINUTES: CPT | Performed by: SOCIAL WORKER

## 2022-03-16 NOTE — PSYCH
Psychotherapy Provided: Individual Psychotherapy 50 minutes     Length of time in session: 50 minutes, follow up in 1 week    Encounter Diagnosis     ICD-10-CM    1  Mixed obsessional thoughts and acts  F42 2    2  Generalized anxiety disorder with panic attacks  F41 1     F41 0    3  Chronic post-traumatic stress disorder (PTSD)  F43 12    4  Bipolar disorder, current episode mixed, severe, without psychotic features ([facility] [address] )  F31 63        Goals addressed in session: Goal 1, Goal 2 and Goal 3      Pain:      moderate to severe    4    Current suicide risk : Low     D: Met with Jenny individually  'Things aren't that great'  C/O hyper sominia, fatigue  Feels being in the sun last week triggered nausea due to Lupus  Discussed family dynamics; primarily sister - demonstrating rude and disrespectful behavior - 'my parents aren't giving in anymore to it '  Specific incidents discussed  Elisabeth Wadsworth acknowledged hurt as feels she's helped sister a lot and now Evonne Pearce is sabotaging stuff'  Denied SI    A: Elisabeth Wadsworth presented more constricted today - continues to process emotions well  P: Continue individual therapy    Behavioral Health Treatment Plan [facility]: Diagnosis and Treatment Plan explained to Carisa Jordan relates understanding diagnosis and is agreeable to Treatment Plan   Yes

## 2022-03-17 ENCOUNTER — TELEPHONE (OUTPATIENT)
Dept: PSYCHIATRY | Facility: CLINIC | Age: 27
End: 2022-03-17

## 2022-03-17 NOTE — TELEPHONE ENCOUNTER
----- Message from Debora Morgan sent at 3/17/2022  9:12 AM EDT -----    ----- Message -----  From: Sunil Farley MD  Sent: 3/16/2022   3:45 PM EDT  To: Debora Morgan    Yes depakote must be tapered by 250 mg at least weekly   ----- Message -----  From: Debora Morgan  Sent: 3/16/2022   3:43 PM EDT  To: Natalia Payan RN, #    Tsosie Drought wants to come off meds - will come to her appointment though in a few months    Asking if must reduce before going off totally:  feels no different and wants to try without    Currently on:    Depakote  mg (this one is the most concern)  Latuda 20mg    Please advise    Jyoti/Debra can you follow up with Kathy Walters once Dr Hank Joshi responds   :)    [facility]

## 2022-03-17 NOTE — TELEPHONE ENCOUNTER
Left a VM for Jenny:  Dr Annamarie Kirkpatrick got the message from Claire Hui; will review instructions for taper; nursing number given

## 2022-03-22 NOTE — TELEPHONE ENCOUNTER
Spoke with Nikki Kennedy and reviewed direction per Dr Emerson Bacon  Encouraged her to call the office with concerns during taper  She verbalized understanding of same

## 2022-03-29 ENCOUNTER — OFFICE VISIT (OUTPATIENT)
Dept: CARDIOLOGY CLINIC | Facility: CLINIC | Age: 27
End: 2022-03-29
Payer: COMMERCIAL

## 2022-03-29 VITALS
HEIGHT: 62 IN | HEART RATE: 77 BPM | BODY MASS INDEX: 28.34 KG/M2 | WEIGHT: 154 LBS | SYSTOLIC BLOOD PRESSURE: 108 MMHG | DIASTOLIC BLOOD PRESSURE: 70 MMHG

## 2022-03-29 DIAGNOSIS — I32 CHRONIC PERICARDITIS ASSOCIATED WITH OTHER DISEASE, UNSPECIFIED COMPLICATION STATUS: ICD-10-CM

## 2022-03-29 DIAGNOSIS — R07.81 CHEST PAIN, PLEURITIC: Primary | ICD-10-CM

## 2022-03-29 PROCEDURE — 1036F TOBACCO NON-USER: CPT | Performed by: INTERNAL MEDICINE

## 2022-03-29 PROCEDURE — 93000 ELECTROCARDIOGRAM COMPLETE: CPT | Performed by: INTERNAL MEDICINE

## 2022-03-29 PROCEDURE — 3008F BODY MASS INDEX DOCD: CPT | Performed by: INTERNAL MEDICINE

## 2022-03-29 PROCEDURE — 99204 OFFICE O/P NEW MOD 45 MIN: CPT | Performed by: INTERNAL MEDICINE

## 2022-03-29 RX ORDER — COLCHICINE 0.6 MG/1
0.6 TABLET ORAL 2 TIMES DAILY
Qty: 90 TABLET | Refills: 0 | Status: SHIPPED | OUTPATIENT
Start: 2022-03-29 | End: 2022-05-17

## 2022-03-29 NOTE — PATIENT INSTRUCTIONS
Acute Pericarditis   WHAT YOU NEED TO KNOW:   What is acute pericarditis? Acute pericarditis is inflammation of the pericardium  The pericardium is the thin sac that surrounds your heart  A small amount of clear fluid between the heart and the sac allows the heart to beat easily  With acute pericarditis, the amount of fluid increases and may contain pus  This can lead to problems with the way that your heart beats  What causes acute pericarditis? The cause may not be known  Pericarditis may be caused by any of the following:  · Injuries or accidents that cause a hard blow to the chest and damage the sac    · Germs, such as viruses and bacteria, or an infection in another area of your body that spreads to the sac    · Medicines such as those used to treat high blood pressure, cancer, and infection    · Heart surgery or radiation therapy    · A heart attack that damages the heart muscle    · Fluid and chemical buildup in your body and around your heart from kidney failure    · Autoimmune diseases, cancer, or tuberculosis (TB) that damages the sac or increases the amount of fluid    · A growing baby during pregnancy that pushes on your heart and causes problems    What are the signs and symptoms of acute pericarditis? The signs and symptoms may appear suddenly and worsen quickly  You may have any of the following:  · Pain in your chest that becomes worse when you lie down    · Fast heartbeat    · Shortness of breath    · Fever    · Feeling more tired than usual and getting tired easily    How is acute pericarditis diagnosed? Your healthcare provider will examine you and ask you about medical problems that you have had in the past  He or she will listen to your heart  You may also have any of the following tests:  · Blood tests  are used to give healthcare providers information about how your body is working  · Telemetry  is continuous monitoring of your heart rhythm   Sticky pads placed on your skin connect to an EKG machine that records your heart rhythm  · X-ray  pictures of your lungs and heart may be used to check the fluid around your heart  Chest x-rays may show signs of infection around your heart  · An echocardiogram  is a type of ultrasound  Sound waves are used to show the structure and function of your heart  · Transesophageal echocardiogram (CAREN)  is a type of ultrasound that shows pictures of the size and shape of your heart  It also looks at how your heart moves when it is beating  You may also need a CAREN to check for certain problems such as blood clots or infection inside the heart  · CT scan or MRI  pictures may be used to check the amount of fluid around your heart  You may be given contrast liquid before the pictures are taken  Tell the healthcare provider if you have ever had an allergic reaction to contrast liquid  Do not enter the MRI room with any metal objects  Metal can cause serious injury  Tell the provider if you have any metal in or on your body  · Pericardiocentesis  is a procedure used to drain extra fluid from the sac  The fluid is sent to a lab to be checked for infection  · Pericardial biopsy  is a procedure used to remove a small piece of the heart sac  The piece is sent to a lab for tests  How is acute pericarditis treated? · NSAIDs  help decrease swelling and pain or fever  This medicine is available with or without a doctor's order  NSAIDs can cause stomach bleeding or kidney problems in certain people  If you take blood thinner medicine, always ask your healthcare provider if NSAIDs are safe for you  Always read the medicine label and follow directions  · Antibiotics  help prevent or treat a bacterial infection  · Steroid medicine  helps lower inflammation  What can I do to prevent infections? The following can help prevent the spread of viruses and bacteria that can cause acute pericarditis or make it worse:  · Wash your hands often    Wash your hands several times each day  Wash after you use the bathroom, change a child's diaper, and before you prepare or eat food  Use soap and water every time  Rub your soapy hands together, lacing your fingers  Wash the front and back of your hands, and in between your fingers  Use the fingers of one hand to scrub under the fingernails of the other hand  Wash for at least 20 seconds  Rinse with warm, running water for several seconds  Then dry your hands with a clean towel or paper towel  Use hand  that contains alcohol if soap and water are not available  Do not touch your eyes, nose, or mouth without washing your hands first          · Cover a sneeze or cough  Use a tissue that covers your mouth and nose  Throw the tissue away in a trash can right away  Use the bend of your arm if a tissue is not available  Wash your hands well with soap and water or use a hand   · Clean surfaces often  Clean doorknobs, countertops, cell phones, and other surfaces that are touched often  Use a disinfecting wipe, a single-use sponge, or a cloth you can wash and reuse  Use disinfecting  if you do not have wipes  You can create a disinfecting  by mixing 1 part bleach with 10 parts water  · Ask about vaccines you may need  Vaccines help protect against viruses and bacteria that cause certain diseases  Your healthcare provider can tell you which vaccines you may need and when to get them  ? Get the influenza (flu) vaccine as soon as recommended each year  The flu vaccine is usually available starting in September or October  Flu viruses change, so it is important to get a flu vaccine every year  ? Get the pneumonia vaccine if recommended  This vaccine is usually recommended every 5 years  Your provider will tell you when to get this vaccine, if needed  When should I seek immediate care? · You have shortness of breath that is worse when you lie down      · Your chest pain gets worse or does not get better  When should I call my doctor? · You have a fever  · You have questions or concerns about your condition or care  CARE AGREEMENT:   You have the right to help plan your care  Learn about your health condition and how it may be treated  Discuss treatment options with your healthcare providers to decide what care you want to receive  You always have the right to refuse treatment  The above information is an  only  It is not intended as medical advice for individual conditions or treatments  Talk to your doctor, nurse or pharmacist before following any medical regimen to see if it is safe and effective for you  © Copyright [employer] 2022 Information is for End User's use only and may not be sold, redistributed or otherwise used for commercial purposes   All illustrations and images included in CareNotes® are the copyrighted property of [employer]  or [address] [place], [place] [address]randy jose

## 2022-03-29 NOTE — PROGRESS NOTES
[facility]  Office Consultation  Joanne Talbot 24 y o  female MRN: [id number]        Chief Complaint    Chief Complaint   Patient presents with    Chest Pain     New patient consult       Referring Provider: MARGARET Winn    Impression & Plan:    1  Chest pain, pleuritic  - Ambulatory Referral to Cardiology  - POCT ECG  - colchicine (COLCRYS) 0 6 mg tablet; Take 1 tablet (0 6 mg total) by mouth 2 (two) times a day  Dispense: 90 tablet; Refill: 0    2  Chronic pericarditis associated with other disease, unspecified complication status    Chest pain is likely pleuritic in nature due to resolving pericarditis  Inflammatory markers improved on recent lab work  No effusion or regional wall motion abnormalities on echocardiogram   Pain did improve with steroids, which are associated with recurrence of pericarditis  She cannot currently take NSAIDs due to gastritis, identified on recent EGD  The pain is not so disturbing as to warrant another course of steroids, especially considering her propensity for severe hyperglycemia in the setting of type 1 diabetes  Will prescribe 3 month course of colchicine 0 6 mg b i d  I have advised her to follow up my recommendation with her rheumatologist, Dr Xi Calle, as this is certainly an inflammatory component of her undifferentiated connective tissue disease, rather than isolated cardiovascular ailment  We will see Joanne Talbot back as needed  HPI: Joanne Talbot is a 24y o  year old female with T1DM , last A1c 1/3/22 7 1%, Hashimoto's thyroid disease, bipolar, depression, 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, who presents with chest pain  She had a COVID booster in January 2022 which causes a flare of her connective tissue disease  At that time she developed joint pain, swelling, rashes, fatigue, and chest pain  The chest pain came suddenly, was sharp, severe, and random  It had no relation to rest or exertion  It was exacerbated by taking in a deep breath  Over the course of the last few weeks it has actually improved  She has a lingering tightness in the center of her chest  She feels more chest pain when her blood sugars are elevated  There is still no exertional component of the chest pain but does think that when she is very physically active her breathing feels restricted  The chest pain is not a new symptom  She often gets chest pain with her rheumatologic flares  She had similar symptoms 2-3 years ago  I personally reviewed independently reinterpreted the echocardiogram from 03/04/2022  Study shows normal LV size and function, LVEF 55%  No regional wall motion abnormalities  Normal RV size and function  No significant valvular disease  No pericardial effusion  She had an EGD performed in January 2022 which found patchy gastritis and was advised to avoid NSAIDs  Review of Systems   Constitutional: Negative for activity change and fatigue  HENT: Negative for facial swelling  Eyes: Negative for visual disturbance  Respiratory: Negative for chest tightness and shortness of breath  Cardiovascular: Positive for chest pain  Negative for palpitations and leg swelling  Gastrointestinal: Negative for nausea and vomiting  Musculoskeletal: Negative for myalgias  Skin: Negative for pallor  Allergic/Immunologic: Negative for immunocompromised state  Neurological: Negative for dizziness, syncope and light-headedness  Psychiatric/Behavioral: Negative for agitation and confusion  The patient is not nervous/anxious            Past Medical History:   Diagnosis Date    Abdominal pain     Anxiety     Anxiety and depression     COVID-19 12/2020    Depression     Eating disorder     history of anorexia/bulemia 8432-5792    Fracture of fibula     R Salter I   Jil Estrin disease     Hashimoto's disease 2/21/2020    Head injury     Nasal congestion     PTSD (post-traumatic stress disorder)     Rectal bleed     Seizures (HCC)     Type 1 diabetes ([facility] [address] )      Past Surgical History:   Procedure Laterality Date    COLONOSCOPY      KNEE SURGERY Right 07/06/2020    NASAL SEPTOPLASTY W/ TURBINOPLASTY      SINUS SURGERY      SKIN BIOPSY      TURBINOPLASTY N/A 3/22/2021    Procedure: Warden Krishnan;  Surgeon: Kathy Edouard MD;  [facility];  Service: ENT    UPPER GASTROINTESTINAL ENDOSCOPY      WISDOM TOOTH EXTRACTION      WRIST SURGERY      left; Excision of ganglion     Social History     Substance and Sexual Activity   Alcohol Use Never     Social History     Substance and Sexual Activity   Drug Use Never     Social History     Tobacco Use   Smoking Status Never Smoker   Smokeless Tobacco Never Used   Tobacco Comment    Tobacco smoke exposure (Father smokes cigars)     Family History   Problem Relation Age of Onset    Hypertension Mother     Migraines Mother         Headache    Diabetes type II Mother     Varicose Veins Mother     Hyperlipidemia Mother     Diabetes Mother     Arthritis Mother     Depression Mother     Cholelithiasis Father     Hypertension Father     Sarcoidosis Father         Liver    Hyperlipidemia Father     Diabetes Father     Coronary artery disease Father     Nephrolithiasis Father     Cirrhosis Father     Alcohol abuse Father     Thyroid disease Sister     Hashimoto's thyroiditis Sister     Cancer Family     Diabetes Family     Hypertension Family     Alcohol abuse Brother        Allergies: Allergies   Allergen Reactions    Iodine - Food Allergy Throat Swelling    Insulin Glargine Other (See Comments)     LANTUS BRAND -Burning and redness under skin        Medications (as of START of this encounter):    Outpatient Medications Prior to Visit   Medication Sig Dispense Refill    clonazePAM (KlonoPIN) 0 5 mg tablet Take 1 tablet (0 5 mg total) by mouth 2 (two) times a day 45 tablet 2    divalproex sodium (DEPAKOTE ER) 250 mg 24 hr tablet TAKE 1 TABLET (250 MG TOTAL) BY MOUTH DAILY AT BEDTIME 90 tablet 1    hydroxychloroquine (PLAQUENIL) 200 mg tablet TAKE 1 TABLET (200 MG TOTAL) BY MOUTH 2 (TWO) TIMES A DAY WITH MEALS 60 tablet 5    insulin aspart (NovoLOG) 100 units/mL injection Inject  Units under the skin      levonorgestrel-ethinyl estradiol (Altavera) 0 15-30 MG-MCG per tablet Take 1 tablet by mouth daily 84 tablet 2    levothyroxine 25 mcg tablet Take 1 tablet (25 mcg total) by mouth daily 60 tablet 0    metFORMIN (GLUCOPHAGE-XR) 500 mg 24 hr tablet 1,000 mg 2 (two) times a day with meals        pantoprazole (PROTONIX) 40 mg tablet Take 1 tablet (40 mg total) by mouth daily 90 tablet 0    promethazine (PHENERGAN) 12 5 MG tablet Take 1 tablet (12 5 mg total) by mouth every 6 (six) hours as needed for nausea or vomiting 30 tablet 0    Ciclopirox 1 % shampoo       Diclofenac Sodium (VOLTAREN) 1 % Apply 2 g topically 4 (four) times a day (Patient not taking: Reported on 3/29/2022 ) 100 g 0    divalproex sodium (DEPAKOTE ER) 500 mg 24 hr tablet Take 1 tablet (500 mg total) by mouth daily 30 tablet 2    EPINEPHrine (EPIPEN) 0 3 mg/0 3 mL SOAJ Inject 0 3 mL (0 3 mg total) into a muscle once for 1 dose (Patient not taking: Reported on 3/29/2022 ) 0 6 mL 0    Glucagon 1 MG/0 2ML SOAJ Inject 1 mg under the skin (Patient not taking: Reported on 3/29/2022 )      ibuprofen (MOTRIN) 400 mg tablet Take 1 tablet (400 mg total) by mouth every 6 (six) hours as needed for mild pain for up to 3 days 12 tablet 0    Insulin Pen Needle (BD PEN NEEDLE NASIM U/F) 32G X 4 MM MISC by Does not apply route 4 (four) times a day for 180 days 400 each 3    lurasidone (Latuda) 20 mg tablet Take 1 tablet (20 mg total) by mouth daily with breakfast 30 tablet 2    magnesium oxide (MAG-OX) 400 mg Take 1 tablet (400 mg total) by mouth daily (Patient not taking: Reported on 2/22/2022 ) 90 tablet 0    mometasone (ELOCON) 0 1 % lotion        pantoprazole (PROTONIX) 40 mg tablet Take 1 tablet (40 mg total) by mouth daily 90 tablet 0    predniSONE 20 mg tablet Take 1 tablet (20 mg total) by mouth daily 7 tablet 0    tretinoin (RETIN-A) 0 025 % cream        No facility-administered medications prior to visit  Vitals:    03/29/22 0854   BP: 108/70   Pulse: 77     Weight (last 2 days)     Date/Time Weight    03/29/22 0854 69 9 (154)          General: Tosin Mcdonald is a well appearing female, in no acute distress, sitting comfortably  HEENT: moist mucous membranes, EOMI  Neck:  No JVD, supple, trachea midline   Cardiovascular: unremarkable S1/S2, regular rate and rhythm, no murmurs, rubs or gallops   Pulmonary: normal respiratory effort, CTAB   Abdomen: soft and nondistended  Extremities: No lower extremity edema  Warm and well perfused extremities  Neuro: no focal motor deficits, AAOx3 (person, place, time)  Psych: Normal mood and affect, cooperative      Laboratory Studies:    Laboratory studies personally reviewed  Lipid panel 01/03/2022 showed , TG 88, HDL 53,   HB A1c 01/03/2022 7 1%  Sedimentation rate to 02/16/2022 14 millimeters/hour  C reactive protein 02/16/2022 less than 3 mg/L  D-dimer 02/23/2022 0 35 WNL          Cardiac testing:     EKG reviewed personally:   EKG in the office today shows normal sinus rhythm, 77 bpm, normal axis, normal intervals  Nonspecific T-wave abnormality including shallow T-wave inversion and flattening in the inferior leads  Borderline study  Reviewed patient's EGD from 01/13/2022, which showed mild patchy edematous and erythematous mucosa consistent with patchy gastritis  She was advised to avoid NSAIDs  Time Spent:  Total time (face-to-face and non-face-to-face) spent on today's visit was 40 minutes   This includes preparation for the visits (i e  reviewing test results), performance of a medically appropriate history and examination, and orders for medications, tests or other procedures  This time is exclusive of procedures performed and time spent teaching  Kelsi Kimball MD    This note was completed in part utilizing M*Modal fluency direct voice recognition software  Grammatical errors, random word insertion, spelling mistakes, occasional wrong word or "sound-alike" substitutions and incomplete sentences may be an occasional consequence of the system secondary to software limitations, ambient noise and hardware issues  At the time of dictation, efforts were made to edit, clarify and /or correct errors  Please read the chart carefully and recognize, using context, where substitutions have occurred  If you have any questions or concerns about the context, text or information contained within the body of this dictation, please contact myself, the provider, for further clarification

## 2022-03-30 ENCOUNTER — SOCIAL WORK (OUTPATIENT)
Dept: BEHAVIORAL/MENTAL HEALTH CLINIC | Facility: CLINIC | Age: 27
End: 2022-03-30
Payer: COMMERCIAL

## 2022-03-30 DIAGNOSIS — F41.0 GENERALIZED ANXIETY DISORDER WITH PANIC ATTACKS: ICD-10-CM

## 2022-03-30 DIAGNOSIS — F42.2 MIXED OBSESSIONAL THOUGHTS AND ACTS: Primary | ICD-10-CM

## 2022-03-30 DIAGNOSIS — F41.1 GENERALIZED ANXIETY DISORDER WITH PANIC ATTACKS: ICD-10-CM

## 2022-03-30 DIAGNOSIS — F31.63 BIPOLAR DISORDER, CURRENT EPISODE MIXED, SEVERE, WITHOUT PSYCHOTIC FEATURES (HCC): ICD-10-CM

## 2022-03-30 DIAGNOSIS — F43.12 CHRONIC POST-TRAUMATIC STRESS DISORDER (PTSD): ICD-10-CM

## 2022-03-30 PROCEDURE — 90834 PSYTX W PT 45 MINUTES: CPT | Performed by: SOCIAL WORKER

## 2022-03-30 NOTE — PSYCH
Psychotherapy Provided: Individual Psychotherapy 50 minutes     Length of time in session: 50 minutes, follow up in 1 week    Encounter Diagnosis     ICD-10-CM    1  Mixed obsessional thoughts and acts  F42 2    2  Generalized anxiety disorder with panic attacks  F41 1     F41 0    3  Chronic post-traumatic stress disorder (PTSD)  F43 12    4  Bipolar disorder, current episode mixed, severe, without psychotic features ([facility] [address] )  F31 63        Goals addressed in session: Goal 1, Goal 2 and Goal 3      Pain:      moderate to severe    4    Current suicide risk : Low     D: Met with Jenny moses  Medical obstacles continue - in bed for over a week  Able to do classes virtually  Grades good  Further discussion of relationship with Corey Carrington; specific examples of disagreement discussed  A: Pete Almendarez presented with appropriate mood and affect  Doing a great job practicing boundaries with all family members  P: Continue individual therapy    Behavioral Health Treatment Plan [facility]: Diagnosis and Treatment Plan explained to [place] Coronel relates understanding diagnosis and is agreeable to Treatment Plan   Yes

## 2022-04-06 DIAGNOSIS — M62.838 MUSCLE SPASMS OF BOTH LOWER EXTREMITIES: ICD-10-CM

## 2022-04-06 RX ORDER — MAGNESIUM OXIDE 400 MG/1
TABLET ORAL
Qty: 30 TABLET | Refills: 2 | Status: SHIPPED | OUTPATIENT
Start: 2022-04-06 | End: 2022-05-16

## 2022-04-27 ENCOUNTER — TELEMEDICINE (OUTPATIENT)
Dept: BEHAVIORAL/MENTAL HEALTH CLINIC | Facility: CLINIC | Age: 27
End: 2022-04-27
Payer: COMMERCIAL

## 2022-04-27 DIAGNOSIS — F41.1 GENERALIZED ANXIETY DISORDER WITH PANIC ATTACKS: ICD-10-CM

## 2022-04-27 DIAGNOSIS — F41.0 GENERALIZED ANXIETY DISORDER WITH PANIC ATTACKS: ICD-10-CM

## 2022-04-27 DIAGNOSIS — F31.63 BIPOLAR DISORDER, CURRENT EPISODE MIXED, SEVERE, WITHOUT PSYCHOTIC FEATURES (HCC): ICD-10-CM

## 2022-04-27 DIAGNOSIS — F42.2 MIXED OBSESSIONAL THOUGHTS AND ACTS: Primary | ICD-10-CM

## 2022-04-27 PROCEDURE — 90834 PSYTX W PT 45 MINUTES: CPT | Performed by: SOCIAL WORKER

## 2022-04-27 NOTE — PSYCH
Virtual Regular Visit    Verification of patient location:    Patient is located in the following state in which I hold an active license [place]      Assessment/Plan:    Problem List Items Addressed This Visit     None          Goals addressed in session: Goal 1, Goal 2 and Goal 3           Reason for visit is No chief complaint on file  Encounter provider Chadwick Butler    Provider located at [address] [place] [id number] [phone]      Recent Visits  No visits were found meeting these conditions  Showing recent visits within past 7 days and meeting all other requirements  Future Appointments  No visits were found meeting these conditions  Showing future appointments within next 150 days and meeting all other requirements       The patient was identified by name and date of birth  Clary Abdi was informed that this is a telemedicine visit and that the visit is being conducted through[employer] and patient was informed that this is a secure, HIPAA-compliant platform  She agrees to proceed     My office door was closed  No one else was in the room  She acknowledged consent and understanding of privacy and security of the video platform  The patient has agreed to participate and understands they can discontinue the visit at any time  Patient is aware this is a billable service  Subjective  Clary Abdi is a 24 y o  female    D: Met with Gaye Marcano individually  Was in ED for irretractable Vomiting and gastroenteritis  Endo suggested ED as no food could be kept down and probable low BS   'I'm very upset'  Geraldine Torrez will be going to [employer] [place]'s degree  Gaye Marcano is 'beside herself' she is moving to [place]  School is in the city and Gaye Marcano is used to quiet of [address]    Doesn't want to leave family or [place] team   Option of Chris's parents a house for them while there is on the table  Denied SI     A: Toby Marking presented depressed and tearful over current circumstances  P: Continue individual therapy        HPI     Past Medical History:   Diagnosis Date    Abdominal pain     Anxiety     Anxiety and depression     COVID-19 12/2020    Depression     Eating disorder     history of anorexia/bulemia [id number]    Fracture of fibula     R Salter I   Iram Gavel disease     Hashimoto's disease 2/21/2020    Head injury     Nasal congestion     PTSD (post-traumatic stress disorder)     Rectal bleed     Seizures (HCC)     Type 1 diabetes (HCC)        Past Surgical History:   Procedure Laterality Date    COLONOSCOPY      KNEE SURGERY Right 07/06/2020    NASAL SEPTOPLASTY W/ TURBINOPLASTY      SINUS SURGERY      SKIN BIOPSY      TURBINOPLASTY N/A 3/22/2021    Procedure: TURBINOPLASTY;  Surgeon: Debi Cotton MD;  [facility];  Service: ENT    UPPER GASTROINTESTINAL ENDOSCOPY      WISDOM TOOTH EXTRACTION      WRIST SURGERY      left;  Excision of ganglion       Current Outpatient Medications   Medication Sig Dispense Refill    Ciclopirox 1 % shampoo       clonazePAM (KlonoPIN) 0 5 mg tablet Take 1 tablet (0 5 mg total) by mouth 2 (two) times a day 45 tablet 2    colchicine (COLCRYS) 0 6 mg tablet Take 1 tablet (0 6 mg total) by mouth 2 (two) times a day 90 tablet 0    Diclofenac Sodium (VOLTAREN) 1 % Apply 2 g topically 4 (four) times a day (Patient not taking: Reported on 3/29/2022 ) 100 g 0    divalproex sodium (DEPAKOTE ER) 250 mg 24 hr tablet TAKE 1 TABLET (250 MG TOTAL) BY MOUTH DAILY AT BEDTIME 90 tablet 1    divalproex sodium (DEPAKOTE ER) 500 mg 24 hr tablet Take 1 tablet (500 mg total) by mouth daily 30 tablet 2    EPINEPHrine (EPIPEN) 0 3 mg/0 3 mL SOAJ Inject 0 3 mL (0 3 mg total) into a muscle once for 1 dose (Patient not taking: Reported on 3/29/2022 ) 0 6 mL 0    Glucagon 1 MG/0 2ML SOAJ Inject 1 mg under the skin (Patient not taking: Reported on 3/29/2022 )      hydroxychloroquine (PLAQUENIL) 200 mg tablet TAKE 1 TABLET (200 MG TOTAL) BY MOUTH 2 (TWO) TIMES A DAY WITH MEALS 60 tablet 5    ibuprofen (MOTRIN) 400 mg tablet Take 1 tablet (400 mg total) by mouth every 6 (six) hours as needed for mild pain for up to 3 days 12 tablet 0    insulin aspart (NovoLOG) 100 units/mL injection Inject  Units under the skin      Insulin Pen Needle (BD PEN NEEDLE NASIM U/F) 32G X 4 MM MISC by Does not apply route 4 (four) times a day for 180 days 400 each 3    levonorgestrel-ethinyl estradiol (Altavera) 0 15-30 MG-MCG per tablet Take 1 tablet by mouth daily 84 tablet 2    levothyroxine 25 mcg tablet Take 1 tablet (25 mcg total) by mouth daily 60 tablet 0    lurasidone (Latuda) 20 mg tablet Take 1 tablet (20 mg total) by mouth daily with breakfast 30 tablet 2    magnesium oxide (MAG-OX) 400 mg tablet TAKE 1 TABLET BY MOUTH EVERY DAY 30 tablet 2    metFORMIN (GLUCOPHAGE-XR) 500 mg 24 hr tablet 1,000 mg 2 (two) times a day with meals        mometasone (ELOCON) 0 1 % lotion        pantoprazole (PROTONIX) 40 mg tablet Take 1 tablet (40 mg total) by mouth daily 90 tablet 0    pantoprazole (PROTONIX) 40 mg tablet Take 1 tablet (40 mg total) by mouth daily 90 tablet 0    predniSONE 20 mg tablet Take 1 tablet (20 mg total) by mouth daily 7 tablet 0    promethazine (PHENERGAN) 12 5 MG tablet Take 1 tablet (12 5 mg total) by mouth every 6 (six) hours as needed for nausea or vomiting 30 tablet 0    tretinoin (RETIN-A) 0 025 % cream        No current facility-administered medications for this visit  Allergies   Allergen Reactions    Iodine - Food Allergy Throat Swelling    Insulin Glargine Other (See Comments)     LANTUS BRAND -Burning and redness under skin        Review of Systems    Video Exam    There were no vitals filed for this visit      Physical Exam     I spent 50 minutes directly with the patient during this visit    VIRTUAL VISIT DISCLAIMER    Hay Barakat verbally agrees to participate in [employer]  Pt is aware that [employer] could be limited without vital signs or the ability to perform a full hands-on physical exam  Jenny Rodrigues understands she or the provider may request at any time to terminate the video visit and request the patient to seek care or treatment in person

## 2022-05-04 ENCOUNTER — TELEMEDICINE (OUTPATIENT)
Dept: BEHAVIORAL/MENTAL HEALTH CLINIC | Facility: CLINIC | Age: 27
End: 2022-05-04
Payer: COMMERCIAL

## 2022-05-04 DIAGNOSIS — F41.1 GENERALIZED ANXIETY DISORDER WITH PANIC ATTACKS: ICD-10-CM

## 2022-05-04 DIAGNOSIS — F31.63 BIPOLAR DISORDER, CURRENT EPISODE MIXED, SEVERE, WITHOUT PSYCHOTIC FEATURES (HCC): ICD-10-CM

## 2022-05-04 DIAGNOSIS — F41.0 GENERALIZED ANXIETY DISORDER WITH PANIC ATTACKS: ICD-10-CM

## 2022-05-04 DIAGNOSIS — F42.2 MIXED OBSESSIONAL THOUGHTS AND ACTS: Primary | ICD-10-CM

## 2022-05-04 DIAGNOSIS — F43.12 CHRONIC POST-TRAUMATIC STRESS DISORDER (PTSD): ICD-10-CM

## 2022-05-04 PROCEDURE — 90834 PSYTX W PT 45 MINUTES: CPT | Performed by: SOCIAL WORKER

## 2022-05-04 NOTE — PSYCH
Virtual Regular Visit    Verification of patient location:    Patient is located in the following state in which I hold an active license [place]      Assessment/Plan:    Problem List Items Addressed This Visit        Other    Chronic post-traumatic stress disorder (PTSD)    Generalized anxiety disorder with panic attacks    OCD (obsessive compulsive disorder) - Primary    Bipolar affective disorder ([facility] [address] )          Goals addressed in session: Goal 1, Goal 2 and Goal 3           Reason for visit is No chief complaint on file  Encounter provider Eduardo Frank    Provider located at [address] [place] [id number] [phone]      Recent Visits  Date Type Provider Dept   04/27/22 [facility] Therapist Ivan   Showing recent visits within past 7 days and meeting all other requirements  Today's Visits  Date Type Provider Dept   05/04/22 [facility] Therapist Ivan   Showing today's visits and meeting all other requirements  Future Appointments  No visits were found meeting these conditions  Showing future appointments within next 150 days and meeting all other requirements       The patient was identified by name and date of birth  Melvin Collazo was informed that this is a telemedicine visit and that the visit is being conducted through[employer] and patient was informed that this is a secure, HIPAA-compliant platform  She agrees to proceed     My office door was closed  No one else was in the room  She acknowledged consent and understanding of privacy and security of the video platform  The patient has agreed to participate and understands they can discontinue the visit at any time  Patient is aware this is a billable service  Subjective  Melvin Collazo is a 24 y o  female    D: Met with Debra Horan individually    States she feels she's in a manic state 'Not over the top but definitely much more active'  Acknowledged need to watch spending and urges for impulsive behaviors  Staying with Sister in [place] for a few days as she is struggling with moving away from the family  [facility] is triggered by this as [facility] the [facility] [place] [facility] for 9 days  [facility] also graduates Saturday  A: [facility] presented with more rapid speech however she feels more relaxed since school is over and cruise coming up  P: Continue individual therapy      HPI     Past Medical History:   Diagnosis Date    Abdominal pain     Anxiety     Anxiety and depression     COVID-19 12/2020    Depression     Eating disorder     history of anorexia/bulemia [id number]    Fracture of fibula     R Salter I   Jinger Oleg disease     Hashimoto's disease 2/21/2020    Head injury     Nasal congestion     PTSD (post-traumatic stress disorder)     Rectal bleed     Seizures (HCC)     Type 1 diabetes (HCC)        Past Surgical History:   Procedure Laterality Date    COLONOSCOPY      KNEE SURGERY Right 07/06/2020    NASAL SEPTOPLASTY W/ TURBINOPLASTY      SINUS SURGERY      SKIN BIOPSY      TURBINOPLASTY N/A 3/22/2021    Procedure: TURBINOPLASTY;  Surgeon: Yolanda Soriano MD;  [facility];  Service: ENT    UPPER GASTROINTESTINAL ENDOSCOPY      WISDOM TOOTH EXTRACTION      WRIST SURGERY      left;  Excision of ganglion       Current Outpatient Medications   Medication Sig Dispense Refill    Ciclopirox 1 % shampoo       clonazePAM (KlonoPIN) 0 5 mg tablet Take 1 tablet (0 5 mg total) by mouth 2 (two) times a day 45 tablet 2    colchicine (COLCRYS) 0 6 mg tablet Take 1 tablet (0 6 mg total) by mouth 2 (two) times a day 90 tablet 0    Diclofenac Sodium (VOLTAREN) 1 % Apply 2 g topically 4 (four) times a day (Patient not taking: Reported on 3/29/2022 ) 100 g 0    divalproex sodium (DEPAKOTE ER) 250 mg 24 hr tablet TAKE 1 TABLET (250 MG TOTAL) BY MOUTH DAILY AT BEDTIME 90 tablet 1    divalproex sodium (DEPAKOTE ER) 500 mg 24 hr tablet Take 1 tablet (500 mg total) by mouth daily 30 tablet 2    EPINEPHrine (EPIPEN) 0 3 mg/0 3 mL SOAJ Inject 0 3 mL (0 3 mg total) into a muscle once for 1 dose (Patient not taking: Reported on 3/29/2022 ) 0 6 mL 0    Glucagon 1 MG/0 2ML SOAJ Inject 1 mg under the skin (Patient not taking: Reported on 3/29/2022 )      hydroxychloroquine (PLAQUENIL) 200 mg tablet TAKE 1 TABLET (200 MG TOTAL) BY MOUTH 2 (TWO) TIMES A DAY WITH MEALS 60 tablet 5    ibuprofen (MOTRIN) 400 mg tablet Take 1 tablet (400 mg total) by mouth every 6 (six) hours as needed for mild pain for up to 3 days 12 tablet 0    insulin aspart (NovoLOG) 100 units/mL injection Inject  Units under the skin      Insulin Pen Needle (BD PEN NEEDLE NASIM U/F) 32G X 4 MM MISC by Does not apply route 4 (four) times a day for 180 days 400 each 3    levonorgestrel-ethinyl estradiol (Altavera) 0 15-30 MG-MCG per tablet Take 1 tablet by mouth daily 84 tablet 2    levothyroxine 25 mcg tablet Take 1 tablet (25 mcg total) by mouth daily 60 tablet 0    lurasidone (Latuda) 20 mg tablet Take 1 tablet (20 mg total) by mouth daily with breakfast 30 tablet 2    magnesium oxide (MAG-OX) 400 mg tablet TAKE 1 TABLET BY MOUTH EVERY DAY 30 tablet 2    metFORMIN (GLUCOPHAGE-XR) 500 mg 24 hr tablet 1,000 mg 2 (two) times a day with meals        mometasone (ELOCON) 0 1 % lotion        pantoprazole (PROTONIX) 40 mg tablet Take 1 tablet (40 mg total) by mouth daily 90 tablet 0    pantoprazole (PROTONIX) 40 mg tablet Take 1 tablet (40 mg total) by mouth daily 90 tablet 0    predniSONE 20 mg tablet Take 1 tablet (20 mg total) by mouth daily 7 tablet 0    promethazine (PHENERGAN) 12 5 MG tablet Take 1 tablet (12 5 mg total) by mouth every 6 (six) hours as needed for nausea or vomiting 30 tablet 0    tretinoin (RETIN-A) 0 025 % cream        No current facility-administered medications for this visit  Allergies   Allergen Reactions    Iodine - Food Allergy Throat Swelling    Insulin Glargine Other (See Comments)     LANTUS BRAND -Burning and redness under skin        Review of Systems    Video Exam    There were no vitals filed for this visit  Physical Exam     I spent 50 minutes directly with the patient during this visit    VIRTUAL VISIT DISCLAIMER    Mick Vasquez verbally agrees to participate in [employer]  Pt is aware that [employer] could be limited without vital signs or the ability to perform a full hands-on physical exam  Jenny Rodrigues understands she or the provider may request at any time to terminate the video visit and request the patient to seek care or treatment in person

## 2022-05-11 ENCOUNTER — TELEPHONE (OUTPATIENT)
Dept: BEHAVIORAL/MENTAL HEALTH CLINIC | Facility: CLINIC | Age: 27
End: 2022-05-11

## 2022-05-11 ENCOUNTER — TELEMEDICINE (OUTPATIENT)
Dept: BEHAVIORAL/MENTAL HEALTH CLINIC | Facility: CLINIC | Age: 27
End: 2022-05-11
Payer: COMMERCIAL

## 2022-05-11 DIAGNOSIS — F41.1 GENERALIZED ANXIETY DISORDER WITH PANIC ATTACKS: ICD-10-CM

## 2022-05-11 DIAGNOSIS — F42.2 MIXED OBSESSIONAL THOUGHTS AND ACTS: Primary | ICD-10-CM

## 2022-05-11 DIAGNOSIS — F43.12 CHRONIC POST-TRAUMATIC STRESS DISORDER (PTSD): ICD-10-CM

## 2022-05-11 DIAGNOSIS — F41.0 GENERALIZED ANXIETY DISORDER WITH PANIC ATTACKS: ICD-10-CM

## 2022-05-11 DIAGNOSIS — F31.63 BIPOLAR DISORDER, CURRENT EPISODE MIXED, SEVERE, WITHOUT PSYCHOTIC FEATURES (HCC): ICD-10-CM

## 2022-05-11 PROCEDURE — 90834 PSYTX W PT 45 MINUTES: CPT | Performed by: SOCIAL WORKER

## 2022-05-11 NOTE — PSYCH
Virtual Regular Visit    Verification of patient location:    Patient is located in the following state in which I hold an active license [place]      Assessment/Plan:    Problem List Items Addressed This Visit        Other    Chronic post-traumatic stress disorder (PTSD)    Generalized anxiety disorder with panic attacks    OCD (obsessive compulsive disorder) - Primary    Bipolar affective disorder ([facility] [address] )          Goals addressed in session: Goal 1, Goal 2 and Goal 3           Reason for visit is No chief complaint on file  Encounter provider Jodi Taylor    Provider located at [address] [place] [id number] [phone]      Recent Visits  Date Type Provider Dept   05/04/22 8700 Nelli Duran recent visits within past 7 days and meeting all other requirements  Today's Visits  Date Type Provider Dept   05/11/22 Telephone [facility]   05/11/22 Telemedicine Jodi [facility] Therapist [facility]   Showing today's visits and meeting all other requirements  Future Appointments  No visits were found meeting these conditions  Showing future appointments within next 150 days and meeting all other requirements       The patient was identified by name and date of birth  Jose De Jesus Cool was informed that this is a telemedicine visit and that the visit is being conducted through[employer]wind[employer] Embedded and patient was informed this is a secure, HIPAA-complaint platform  She agrees to proceed     My office door was closed  No one else was in the room  She acknowledged consent and understanding of privacy and security of the video platform  The patient has agreed to participate and understands they can discontinue the visit at any time  Patient is aware this is a billable service       Subjective  Toby Marking Sean Wright is a 24 y o  female    D: Met with Shantal Babb individually  Still visiting with sister; parents also came to visit  Shantal Babb discussed recent 'melt down '  Trigger was Chris's sister - commented Shantal Babb 'got fat', Felt overwhelmed as sister 'lashed out' several times triggering a panic attack - sugar increased, needed additional insulin  Never did any of this in front of Marleta Scales internalized comments  Shantal Babb explained later in the night Shantal Babb began to repeat things, 'I had the gone look like I get when my father used to get nasty '    A: Shantal Babb presented tearful and still hyper-viligent by looking all around while alone talking on phone  It is very difficult for Shantal Babb to stand up for herself from previous trauma experiences  P: Continue individual therapy      HPI     Past Medical History:   Diagnosis Date    Abdominal pain     Anxiety     Anxiety and depression     COVID-19 12/2020    Depression     Eating disorder     history of anorexia/bulemia [id number]    Fracture of fibula     R Salter I   Miguel Shaver disease     Hashimoto's disease 2/21/2020    Head injury     Nasal congestion     PTSD (post-traumatic stress disorder)     Rectal bleed     Seizures (HCC)     Type 1 diabetes (HCC)        Past Surgical History:   Procedure Laterality Date    COLONOSCOPY      KNEE SURGERY Right 07/06/2020    NASAL SEPTOPLASTY W/ TURBINOPLASTY      SINUS SURGERY      SKIN BIOPSY      TURBINOPLASTY N/A 3/22/2021    Procedure: TURBINOPLASTY;  Surgeon: Mery Rice MD;  [facility];  Service: ENT    UPPER GASTROINTESTINAL ENDOSCOPY      WISDOM TOOTH EXTRACTION      WRIST SURGERY      left;  Excision of ganglion       Current Outpatient Medications   Medication Sig Dispense Refill    Ciclopirox 1 % shampoo       clonazePAM (KlonoPIN) 0 5 mg tablet Take 1 tablet (0 5 mg total) by mouth 2 (two) times a day 45 tablet 2    colchicine (COLCRYS) 0 6 mg tablet Take 1 tablet (0 6 mg total) by mouth 2 (two) times a day 90 tablet 0    Diclofenac Sodium (VOLTAREN) 1 % Apply 2 g topically 4 (four) times a day (Patient not taking: Reported on 3/29/2022 ) 100 g 0    divalproex sodium (DEPAKOTE ER) 250 mg 24 hr tablet TAKE 1 TABLET (250 MG TOTAL) BY MOUTH DAILY AT BEDTIME 90 tablet 1    divalproex sodium (DEPAKOTE ER) 500 mg 24 hr tablet Take 1 tablet (500 mg total) by mouth daily 30 tablet 2    EPINEPHrine (EPIPEN) 0 3 mg/0 3 mL SOAJ Inject 0 3 mL (0 3 mg total) into a muscle once for 1 dose (Patient not taking: Reported on 3/29/2022 ) 0 6 mL 0    Glucagon 1 MG/0 2ML SOAJ Inject 1 mg under the skin (Patient not taking: Reported on 3/29/2022 )      hydroxychloroquine (PLAQUENIL) 200 mg tablet TAKE 1 TABLET (200 MG TOTAL) BY MOUTH 2 (TWO) TIMES A DAY WITH MEALS 60 tablet 5    ibuprofen (MOTRIN) 400 mg tablet Take 1 tablet (400 mg total) by mouth every 6 (six) hours as needed for mild pain for up to 3 days 12 tablet 0    insulin aspart (NovoLOG) 100 units/mL injection Inject  Units under the skin      Insulin Pen Needle (BD PEN NEEDLE NASIM U/F) 32G X 4 MM MISC by Does not apply route 4 (four) times a day for 180 days 400 each 3    levonorgestrel-ethinyl estradiol (Altavera) 0 15-30 MG-MCG per tablet Take 1 tablet by mouth daily 84 tablet 2    levothyroxine 25 mcg tablet Take 1 tablet (25 mcg total) by mouth daily 60 tablet 0    lurasidone (Latuda) 20 mg tablet Take 1 tablet (20 mg total) by mouth daily with breakfast 30 tablet 2    magnesium oxide (MAG-OX) 400 mg tablet TAKE 1 TABLET BY MOUTH EVERY DAY 30 tablet 2    metFORMIN (GLUCOPHAGE-XR) 500 mg 24 hr tablet 1,000 mg 2 (two) times a day with meals        mometasone (ELOCON) 0 1 % lotion        pantoprazole (PROTONIX) 40 mg tablet Take 1 tablet (40 mg total) by mouth daily 90 tablet 0    pantoprazole (PROTONIX) 40 mg tablet Take 1 tablet (40 mg total) by mouth daily 90 tablet 0    predniSONE 20 mg tablet Take 1 tablet (20 mg total) by mouth daily 7 tablet 0    promethazine (PHENERGAN) 12 5 MG tablet Take 1 tablet (12 5 mg total) by mouth every 6 (six) hours as needed for nausea or vomiting 30 tablet 0    tretinoin (RETIN-A) 0 025 % cream        No current facility-administered medications for this visit  Allergies   Allergen Reactions    Iodine - Food Allergy Throat Swelling    Insulin Glargine Other (See Comments)     L[employer]US BRAND -Burning and redness under skin        Review of Systems    Video Exam    There were no vitals filed for this visit  Physical Exam     I spent 50 minutes directly with the patient during this visit    VIRTUAL VISIT DISCLAIMER    Silvia Matias verbally agrees to participate in [employer]  Pt is aware that [employer] could be limited without vital signs or the ability to perform a full hands-on physical exam  Jenny Rodrigues understands she or the provider may request at any time to terminate the video visit and request the patient to seek care or treatment in person

## 2022-05-11 NOTE — TELEPHONE ENCOUNTER
Patient called to switch in person appt 5/11/22 to virtual appt    Patient want the link to be send in # [phone]

## 2022-05-13 ENCOUNTER — TELEPHONE (OUTPATIENT)
Dept: PSYCHIATRY | Facility: CLINIC | Age: 27
End: 2022-05-13

## 2022-05-13 NOTE — TELEPHONE ENCOUNTER
Pt called to cx 5/25/22 appt with Mahesh Aranda due to travel plans have changes  Since you do your own scheduling can you please reach out to reschedule her she asked if you can see her sooner then the canceled appt of 5/25/22   Thank you

## 2022-05-16 ENCOUNTER — OFFICE VISIT (OUTPATIENT)
Dept: GASTROENTEROLOGY | Facility: AMBULARY SURGERY CENTER | Age: 27
End: 2022-05-16
Payer: COMMERCIAL

## 2022-05-16 VITALS
BODY MASS INDEX: 28.74 KG/M2 | HEIGHT: 62 IN | WEIGHT: 156.2 LBS | SYSTOLIC BLOOD PRESSURE: 120 MMHG | RESPIRATION RATE: 18 BRPM | DIASTOLIC BLOOD PRESSURE: 78 MMHG | OXYGEN SATURATION: 100 % | HEART RATE: 77 BPM

## 2022-05-16 DIAGNOSIS — K58.2 IRRITABLE BOWEL SYNDROME WITH ALTERNATING BOWEL HABITS: ICD-10-CM

## 2022-05-16 DIAGNOSIS — G89.29 CHRONIC ABDOMINAL PAIN: ICD-10-CM

## 2022-05-16 DIAGNOSIS — R14.0 BLOATING: ICD-10-CM

## 2022-05-16 DIAGNOSIS — R11.2 ACUTE NAUSEA WITH NONBILIOUS VOMITING: ICD-10-CM

## 2022-05-16 DIAGNOSIS — R10.9 CHRONIC ABDOMINAL PAIN: ICD-10-CM

## 2022-05-16 DIAGNOSIS — R93.3 ABNORMAL CT SCAN, STOMACH: ICD-10-CM

## 2022-05-16 DIAGNOSIS — R93.3 ABNORMAL CT SCAN, ESOPHAGUS: Primary | ICD-10-CM

## 2022-05-16 DIAGNOSIS — K29.00 ACUTE SUPERFICIAL GASTRITIS WITHOUT HEMORRHAGE: ICD-10-CM

## 2022-05-16 DIAGNOSIS — K31.A11 INTESTINAL METAPLASIA OF ANTRUM OF STOMACH WITHOUT DYSPLASIA: ICD-10-CM

## 2022-05-16 DIAGNOSIS — R19.5 ABNORMAL STOOLS: Primary | ICD-10-CM

## 2022-05-16 PROCEDURE — 1036F TOBACCO NON-USER: CPT | Performed by: PHYSICIAN ASSISTANT

## 2022-05-16 PROCEDURE — 3008F BODY MASS INDEX DOCD: CPT | Performed by: PHYSICIAN ASSISTANT

## 2022-05-16 PROCEDURE — 99214 OFFICE O/P EST MOD 30 MIN: CPT | Performed by: PHYSICIAN ASSISTANT

## 2022-05-16 RX ORDER — METOCLOPRAMIDE 5 MG/1
TABLET ORAL
COMMUNITY
Start: 2022-04-25 | End: 2022-05-16

## 2022-05-16 RX ORDER — INSULIN ASPART 100 [IU]/ML
INJECTION, SOLUTION INTRAVENOUS; SUBCUTANEOUS
COMMUNITY
Start: 2022-04-13 | End: 2022-05-16

## 2022-05-16 RX ORDER — PANTOPRAZOLE SODIUM 40 MG/1
40 TABLET, DELAYED RELEASE ORAL DAILY
Qty: 90 TABLET | Refills: 3 | Status: SHIPPED | OUTPATIENT
Start: 2022-05-16 | End: 2022-08-14

## 2022-05-16 NOTE — PROGRESS NOTES
Assessment and Plan    #1  Acute episode of vomiting and abdominal pain: suspect viral gastroenteritis, occurred a few weeks ago and seen in Ed at [facility]  Resolved    -resume pantoprazole  -had previous testing for gastroparesis and HIDA scan that were normal      #2  Abnormal CT scan of the stomach and esophagus: noted in ED at [facility] during acute vomiting episode, had recent EGD with minimal gastritis and no esophagitis   -suspect inflammation from acute viral gastroenteritis and frequent vomiting episode  Will order UGI series to confirm resolution  No indication for repeat EGD at this time  -resume pantoprazole as above    #3  Intestinal metaplasia of stomach  -resume pantoprazole  -avoid NSAIDs  -repeat EGD in 1-2 years    #4  Chronic intermittent abdominal pain and bloating: suspect functional versus SIBO, was unable to get xifaxan previously  At home SIBO testing has been challenging due to underlying diabetes and insulin pump  -advised to try peppermint oil capsules daily  -can try to get Xifaxan approved now for 14 day course since insurance has changed, sent to [facility] pharmacy  -PPI daily    #4  Alternating bowel habits: on multiple medications and has diabetes, was previosuly having diarrhea relatively cotnrolled with Mary Beth No, now not taking questran and having some constipation  -advised to start fiber supplement daily  -good hydration  -consider miralax as needed   --------------------------------------------------------------------------------------------------------------------    Chief Complaint: f/u recent ED visit    HPI: Gabrielle Bob is a 24 y o  female with a history of type 1 diabetes on insulin pump, Hashimoto's, hypothyroidism, neuropathy, polyarthritis, bipolar disorder, PTSD, generalized anxiety disorder, hyperlipidemia, OCD, seizures who presents today for follow up for multiple GI complaints    Patient has previously been seen several months ago for issues with bowel habits and abdominal pain with nausea  She had a workup including HIDA scan which was negative, gastric emptying study which was normal, and an EGD and colonoscopy  Colonoscopy was in 2019 was relatively unremarkable  Endoscopy was just performed this year and showed some mild gastritis and biopsies were positive for intestinal metaplasia  Patient reports that she was seen in the emergency room a few weeks ago at [facility] after having a sudden onset of severe vomiting and ended up in the hospital   At that time she had a CT scan which showed some thickening of her esophagus and stomach which recommended GI follow-up  She also reports that she stopped her PPI after 90 day course which was sometime last month  She reports that she continues to have her chronic GI symptoms of intermittent abdominal discomfort and bloating  The vomiting episode has resolved  She was previously having loose stools and was taking Questran for this however more recently she has been having issues with constipation  She is no longer taking Questran and reports that she has not gone to the bathroom for a few days  She generally does not see any black stools or blood in the stool but disease streak of blood 1 time when she moved her bowels when she was having the episodes of vomiting  Denies any unexplained weight loss  Denies any excessive NSAID use      She was recommended to have another endoscopy in 1 to 2 years secondary to the intestinal metaplasia    Review of Systems:   General: negative for fatigue, fever, night sweats or unexpected weight loss  Psychological: negative for anxiety or depression  Ophthalmic: negative for blurry vision or scleral icterus  ENT: negative for headaches, oral lesions, sore throat, vocal changes or dysphagia  Hematological and Lymphatic: negative for pallor or swollen lymph nodes  Respiratory: negative for cough, shortness of breath or wheezing  Cardiovascular: negative for chest pain, edema or murmur  Gastrointestinal: as mentioned in HPI  Genito-Urinary: negative for dysuria or incontinence  Musculoskeletal: negative for joint pain, joint stiffness or joint swelling  Dermatological: negative for pruritus, rash, or jaundice    Current Medications  Current Outpatient Medications   Medication Sig Dispense Refill    Ciclopirox 1 % shampoo       clonazePAM (KlonoPIN) 0 5 mg tablet Take 1 tablet (0 5 mg total) by mouth 2 (two) times a day 45 tablet 2    colchicine (COLCRYS) 0 6 mg tablet Take 1 tablet (0 6 mg total) by mouth 2 (two) times a day 90 tablet 0    hydroxychloroquine (PLAQUENIL) 200 mg tablet TAKE 1 TABLET (200 MG TOTAL) BY MOUTH 2 (TWO) TIMES A DAY WITH MEALS 60 tablet 5    insulin aspart (NovoLOG) 100 units/mL injection Inject  Units under the skin      levonorgestrel-ethinyl estradiol (Altavera) 0 15-30 MG-MCG per tablet Take 1 tablet by mouth daily 84 tablet 2    levothyroxine 25 mcg tablet Take 1 tablet (25 mcg total) by mouth daily 60 tablet 0    metFORMIN (GLUCOPHAGE-XR) 500 mg 24 hr tablet 1,000 mg 2 (two) times a day with meals        pantoprazole (PROTONIX) 40 mg tablet Take 1 tablet (40 mg total) by mouth in the morning  90 tablet 3    ibuprofen (MOTRIN) 400 mg tablet Take 1 tablet (400 mg total) by mouth every 6 (six) hours as needed for mild pain for up to 3 days 12 tablet 0    Insulin Pen Needle (BD PEN NEEDLE NASIM U/F) 32G X 4 MM MISC by Does not apply route 4 (four) times a day for 180 days 400 each 3     No current facility-administered medications for this visit         Past Medical History  Past Medical History:   Diagnosis Date    Abdominal pain     Anxiety     Anxiety and depression     COVID-19 12/2020    Depression     Eating disorder     history of anorexia/bulemia [id number]    Fracture of fibula     R Salter I   Cerro Tray disease     Hashimoto's disease 2/21/2020    Head injury     Nasal congestion     PTSD (post-traumatic stress disorder)     Rectal bleed     Seizures (HCC)     Type 1 diabetes ([facility] [address] )        Past Surgical History  Past Surgical History:   Procedure Laterality Date    COLONOSCOPY      KNEE SURGERY Right 07/06/2020    NASAL SEPTOPLASTY W/ TURBINOPLASTY      SINUS SURGERY      SKIN BIOPSY      TURBINOPLASTY N/A 3/22/2021    Procedure: Kyra Amin;  Surgeon: Eulalio Duron MD;  [facility];  Service: ENT    UPPER GASTROINTESTINAL ENDOSCOPY      WISDOM TOOTH EXTRACTION      WRIST SURGERY      left;  Excision of ganglion       Past Social History   Social History     Socioeconomic History    Marital status: Single     Spouse name: None    Number of children: 0    Years of education: None    Highest education level: Associate degree: academic program   Occupational History    Occupation: unemployed   Tobacco Use    Smoking status: Never Smoker    Smokeless tobacco: Never Used    Tobacco comment: Tobacco smoke exposure (Father smokes cigars)   Vaping Use    Vaping Use: Never used   Substance and Sexual Activity    Alcohol use: Never    Drug use: Never    Sexual activity: Yes     Partners: Male     Birth control/protection: Condom Male   Other Topics Concern    None   Social History Narrative    Student at [employer] a poor diet; low in vegetables, high in sweets    Dental care, regularly    Lives with parents    Sleeps 8-10 hours a day     Social Determinants of Health     Financial Resource Strain: Not on file   Food Insecurity: Not on file   Transportation Needs: Not on file   Physical Activity: Not on file   Stress: Not on file   Social Connections: Not on file   Intimate Partner Violence: Not on file   Housing Stability: Not on file       The following portions of the patient's history were reviewed and updated as appropriate: allergies, current medications, past family history, past medical history, past social history, past surgical history and problem list     Vital Signs  Vitals:    05/16/22 1029 BP: 120/78   BP Location: Right arm   Patient Position: Sitting   Cuff Size: Standard   Pulse: 77   Resp: 18   SpO2: 100%   Weight: 70 9 kg (156 lb 3 2 oz)   Height: 5' 2" (1 575 m)       Physical Exam:  General appearance: alert, cooperative, no distress  HEENT: normocephalic, anicteric, no eye erythema or discharge, no oropharyngeal thrush  Neck: supple, trachea midline, no adenopathy  Lungs: CTA b/l, no rales, rhonchi, or wheezing, unlabored respirations  Heart: RRR, no murmur, rubs, or gallops  Abdomen: soft, upper abdominal discomfort to palpation non-distended, normal bowel sounds, no masses or organomegaly  Rectal: deferred  Extremities: no cyanosis, clubbing, or edema  Musculoskeletal: normal gait  Skin: color and texture normal, no jaundice, no rashes or lesions  Psychiatric: alert and oriented, normal affect and behavior

## 2022-05-16 NOTE — PATIENT INSTRUCTIONS
Metamucil or benefiber 2-3 tsp daily in at least 8 ounces of liquid    Peppermint oil capsules 1-2 daily     Upper Gi series    Continue pantoprazole daily

## 2022-05-17 DIAGNOSIS — R07.81 CHEST PAIN, PLEURITIC: ICD-10-CM

## 2022-05-17 RX ORDER — COLCHICINE 0.6 MG/1
TABLET ORAL
Qty: 90 TABLET | Refills: 0 | Status: SHIPPED | OUTPATIENT
Start: 2022-05-17

## 2022-05-19 ENCOUNTER — HOSPITAL ENCOUNTER (OUTPATIENT)
Dept: RADIOLOGY | Facility: HOSPITAL | Age: 27
Discharge: HOME | End: 2022-05-19
Payer: COMMERCIAL

## 2022-05-19 ENCOUNTER — SOCIAL WORK (OUTPATIENT)
Dept: BEHAVIORAL/MENTAL HEALTH CLINIC | Facility: CLINIC | Age: 27
End: 2022-05-19
Payer: COMMERCIAL

## 2022-05-19 DIAGNOSIS — F31.63 BIPOLAR DISORDER, CURRENT EPISODE MIXED, SEVERE, WITHOUT PSYCHOTIC FEATURES (HCC): ICD-10-CM

## 2022-05-19 DIAGNOSIS — F43.12 CHRONIC POST-TRAUMATIC STRESS DISORDER (PTSD): ICD-10-CM

## 2022-05-19 DIAGNOSIS — F41.1 GENERALIZED ANXIETY DISORDER WITH PANIC ATTACKS: ICD-10-CM

## 2022-05-19 DIAGNOSIS — R93.3 ABNORMAL CT SCAN, ESOPHAGUS: ICD-10-CM

## 2022-05-19 DIAGNOSIS — R93.3 ABNORMAL CT SCAN, STOMACH: ICD-10-CM

## 2022-05-19 DIAGNOSIS — F41.0 GENERALIZED ANXIETY DISORDER WITH PANIC ATTACKS: ICD-10-CM

## 2022-05-19 DIAGNOSIS — F42.2 MIXED OBSESSIONAL THOUGHTS AND ACTS: Primary | ICD-10-CM

## 2022-05-19 PROCEDURE — 74240 X-RAY XM UPR GI TRC 1CNTRST: CPT

## 2022-05-19 PROCEDURE — 90834 PSYTX W PT 45 MINUTES: CPT | Performed by: SOCIAL WORKER
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
## 2022-05-24 ENCOUNTER — TELEPHONE (OUTPATIENT)
Dept: GASTROENTEROLOGY | Facility: AMBULARY SURGERY CENTER | Age: 27
End: 2022-05-24

## 2022-05-24 NOTE — TELEPHONE ENCOUNTER
Called patient  No answer only voicemail  Left second message that sibo test needs to be completed and phone number to contact office with any questions or concerns

## 2022-05-24 NOTE — TELEPHONE ENCOUNTER
Received fax from [facility] requesting clarification on what DX they should use  If they use R19 5 or   R19 4 ins will deny it  If they use K58 0 Ins will cover  Please advise    Thank you Phone [phone] Fax [phone]

## 2022-05-24 NOTE — TELEPHONE ENCOUNTER
Please see telephone note from 5/19:    "Received fax that the [facility] script I sent will be denied under the current diagnosis codes  Cannot change to IBS-D code as patient is not currently having diarrhea  Please advise patient that she will need to complete the at home SIBO testing to possibly get covered for this medicaiton under that diagnosis code as I can't get it covered under the current one       Not sure if we need to respond to blue lesly, the message was faxed from [employer] [place]"    I believe ricardo called patient and left a message with this info

## 2022-06-01 ENCOUNTER — SOCIAL WORK (OUTPATIENT)
Dept: BEHAVIORAL/MENTAL HEALTH CLINIC | Facility: CLINIC | Age: 27
End: 2022-06-01
Payer: COMMERCIAL

## 2022-06-01 DIAGNOSIS — F43.12 CHRONIC POST-TRAUMATIC STRESS DISORDER (PTSD): ICD-10-CM

## 2022-06-01 DIAGNOSIS — F41.0 GENERALIZED ANXIETY DISORDER WITH PANIC ATTACKS: ICD-10-CM

## 2022-06-01 DIAGNOSIS — F31.63 BIPOLAR DISORDER, CURRENT EPISODE MIXED, SEVERE, WITHOUT PSYCHOTIC FEATURES (HCC): ICD-10-CM

## 2022-06-01 DIAGNOSIS — F41.1 GENERALIZED ANXIETY DISORDER WITH PANIC ATTACKS: ICD-10-CM

## 2022-06-01 DIAGNOSIS — F42.2 MIXED OBSESSIONAL THOUGHTS AND ACTS: Primary | ICD-10-CM

## 2022-06-01 PROCEDURE — 90834 PSYTX W PT 45 MINUTES: CPT | Performed by: SOCIAL WORKER
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
## 2022-06-01 NOTE — PSYCH
Psychotherapy Provided: Individual Psychotherapy 50 minutes     Length of time in session: 50 minutes, follow up in 1 week    Encounter Diagnosis     ICD-10-CM    1  Mixed obsessional thoughts and acts  F42 2    2  Generalized anxiety disorder with panic attacks  F41 1     F41 0    3  Chronic post-traumatic stress disorder (PTSD)  F43 12    4  Bipolar disorder, current episode mixed, severe, without psychotic features ([facility] [address] )  F31 63        Goals addressed in session: Goal 1, Goal 2 and Goal 3      Pain:      moderate to severe    3    Current suicide risk : Low     D: Met with Jenny individually  Session focused upon recent Cruise - had fun  Sabrina Best proposed  Agustin Meyers feels Sabrina Best is trying but doesn't trust it will continue  Discussed decision to become engaged  Still having great difficult finding housing that's close to the school but this is very expensive and slim pickings  Moods fluctuating more - did come off meds though  Denied SI    A: Agustin Moles presented with appropriate mood and affect  Multiple obstacles with moving to [facility]  [facility] also have different needs/wants in a place  P: Continue individual therapy    A: Agustin Moles presented     [facility] Treatment Plan [facility]: Diagnosis and Treatment Plan explained to Miryam New relates understanding diagnosis and is agreeable to Treatment Plan   Yes

## 2022-06-03 ENCOUNTER — OFFICE VISIT (OUTPATIENT)
Dept: PSYCHIATRY | Facility: CLINIC | Age: 27
End: 2022-06-03
Payer: COMMERCIAL

## 2022-06-03 DIAGNOSIS — F43.12 CHRONIC POST-TRAUMATIC STRESS DISORDER (PTSD): ICD-10-CM

## 2022-06-03 DIAGNOSIS — F41.1 GENERALIZED ANXIETY DISORDER WITH PANIC ATTACKS: ICD-10-CM

## 2022-06-03 DIAGNOSIS — F42.2 MIXED OBSESSIONAL THOUGHTS AND ACTS: ICD-10-CM

## 2022-06-03 DIAGNOSIS — F41.0 GENERALIZED ANXIETY DISORDER WITH PANIC ATTACKS: ICD-10-CM

## 2022-06-03 DIAGNOSIS — F31.4 BIPOLAR DISORDER, CURRENT EPISODE DEPRESSED, SEVERE, WITHOUT PSYCHOTIC FEATURES (HCC): Primary | ICD-10-CM

## 2022-06-03 PROCEDURE — 99213 OFFICE O/P EST LOW 20 MIN: CPT | Performed by: PSYCHIATRY & NEUROLOGY

## 2022-06-03 PROCEDURE — 90833 PSYTX W PT W E/M 30 MIN: CPT | Performed by: PSYCHIATRY & NEUROLOGY

## 2022-06-03 RX ORDER — CLONAZEPAM 0.5 MG/1
0.5 TABLET ORAL 2 TIMES DAILY
Qty: 45 TABLET | Refills: 2 | Status: SHIPPED | OUTPATIENT
Start: 2022-06-03
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
## 2022-06-08 ENCOUNTER — OFFICE VISIT (OUTPATIENT)
Dept: NEUROLOGY | Facility: CLINIC | Age: 27
End: 2022-06-08
Payer: COMMERCIAL

## 2022-06-08 ENCOUNTER — SOCIAL WORK (OUTPATIENT)
Dept: BEHAVIORAL/MENTAL HEALTH CLINIC | Facility: CLINIC | Age: 27
End: 2022-06-08
Payer: COMMERCIAL

## 2022-06-08 VITALS
DIASTOLIC BLOOD PRESSURE: 73 MMHG | HEART RATE: 82 BPM | SYSTOLIC BLOOD PRESSURE: 109 MMHG | HEIGHT: 62 IN | TEMPERATURE: 97.6 F | WEIGHT: 157 LBS | BODY MASS INDEX: 28.89 KG/M2

## 2022-06-08 DIAGNOSIS — R27.8 DECREASED DEXTERITY: ICD-10-CM

## 2022-06-08 DIAGNOSIS — F31.4 BIPOLAR DISORDER, CURRENT EPISODE DEPRESSED, SEVERE, WITHOUT PSYCHOTIC FEATURES (HCC): ICD-10-CM

## 2022-06-08 DIAGNOSIS — M25.50 ARTHRALGIA, UNSPECIFIED JOINT: ICD-10-CM

## 2022-06-08 DIAGNOSIS — F42.2 MIXED OBSESSIONAL THOUGHTS AND ACTS: ICD-10-CM

## 2022-06-08 DIAGNOSIS — E10.649 UNCONTROLLED TYPE 1 DIABETES MELLITUS WITH HYPOGLYCEMIA WITHOUT COMA (HCC): ICD-10-CM

## 2022-06-08 DIAGNOSIS — F43.12 CHRONIC POST-TRAUMATIC STRESS DISORDER (PTSD): ICD-10-CM

## 2022-06-08 DIAGNOSIS — R20.0 LEFT LEG NUMBNESS: ICD-10-CM

## 2022-06-08 DIAGNOSIS — R56.9 SEIZURES (HCC): ICD-10-CM

## 2022-06-08 DIAGNOSIS — R53.1 WEAKNESS GENERALIZED: Primary | ICD-10-CM

## 2022-06-08 DIAGNOSIS — F42.2 MIXED OBSESSIONAL THOUGHTS AND ACTS: Primary | ICD-10-CM

## 2022-06-08 DIAGNOSIS — E06.3 HASHIMOTO'S DISEASE: ICD-10-CM

## 2022-06-08 DIAGNOSIS — F41.1 GENERALIZED ANXIETY DISORDER WITH PANIC ATTACKS: ICD-10-CM

## 2022-06-08 DIAGNOSIS — E53.8 LOW SERUM VITAMIN B12: ICD-10-CM

## 2022-06-08 DIAGNOSIS — F41.0 GENERALIZED ANXIETY DISORDER WITH PANIC ATTACKS: ICD-10-CM

## 2022-06-08 DIAGNOSIS — R76.8 POSITIVE ANA (ANTINUCLEAR ANTIBODY): ICD-10-CM

## 2022-06-08 PROCEDURE — 99215 OFFICE O/P EST HI 40 MIN: CPT | Performed by: PSYCHIATRY & NEUROLOGY

## 2022-06-08 PROCEDURE — 90834 PSYTX W PT 45 MINUTES: CPT | Performed by: SOCIAL WORKER

## 2022-06-08 RX ORDER — GABAPENTIN 300 MG/1
300 CAPSULE ORAL
Qty: 60 CAPSULE | Refills: 0 | Status: SHIPPED | OUTPATIENT
Start: 2022-06-08

## 2022-06-08 NOTE — PROGRESS NOTES
[facility] MULTIPLE SCLEROSIS CENTER  PATIENT:  Gabrielle Bob  MRN:  [id number]  :  1995  DATE OF SERVICE:  2022    Assessment/Plan:       Problem List Items Addressed This Visit        Endocrine    Uncontrolled type 1 diabetes mellitus with hypoglycemia without coma ([facility] [address] )    Hashimoto's disease    Relevant Orders    Vitamin B12       Other    Generalized anxiety disorder with panic attacks    OCD (obsessive compulsive disorder)    Weakness generalized - Primary    Relevant Medications    gabapentin (Neurontin) 300 mg capsule    Other Relevant Orders    Ambulatory Referral to Physical Therapy    Low serum vitamin B12    Relevant Orders    Vitamin B12    Ambulatory Referral to Occupational Therapy    Arthralgia    Relevant Orders    Ambulatory Referral to Occupational Therapy    Positive SOHAM (antinuclear antibody)    Seizures ([facility] [address] )      Other Visit Diagnoses     Decreased dexterity        Relevant Orders    Ambulatory Referral to Physical Therapy    Ambulatory Referral to Occupational Therapy    Left leg numbness        Relevant Medications    gabapentin (Neurontin) 300 mg capsule         Ms Rodrigues has presented to [facility] multiple [address] for follow-up on generalized body weakness/fatigue with significant improvement in the strength in her lower extremities has been reported today since she started going to gym and doing regular physical activity  Patient exercising with a light weights in upper extremities but persistent weakness left hand likely due to remote history of ganglion cyst resection and incomplete recovery  Patient had completed imaging of the brain and thoracic spine previously, no consideration for multiple sclerosis or any other CNS demyelination  Patient was advised to consider physical therapy/OT for her dexterity challenges as patient cannot open her mascara     We extensively discussed normal findings of her EMG studies which were previously evaluating right upper extremity and left lower extremity with normal results has been reported with no signs of myopathy/for due will petit/neuropathy  We also discussed patient has underlying Hashimoto thyroiditis and B12 deficiency, SLE - all likely contributing to her sensory dysfunction  Patient described having dysesthesia in left lower extremity from knee down which interferes with her sleep  Patient was advised to consider gabapentin 300 mg at nighttime as needed  Vitamin B12 level will be checked again and based on findings, medical regimen will be adjusted  To admissions for hypoglycemia and hyperglycemia noted since last office visit  Patient also described multiple fractures in her ankles bilaterally during her childhood in addition to pelvic fracture with medical services has been accommodated at that time  Patient will follow with [facility] within 8 months  Subjective: weakness in both hands, numbness left lower leg and headaches       HPI  Noah Houston Presented to [facility] multiple [address] for follow-up on generalized body weakness and sensory dysfunction left lower extremity, patient described challenges with her dexterity in her right hand as she can no longer open musk car  Patient started Plaquenil year ago for lupus with significant improvement in multifocal joints pain has been described  Patient stated she has persistent dysesthesia in the left lower extremity brought by sitting or lying down which interferes with her sleep  Patient stated she has left wrist surgery due to ganglion cyst with no strand regained after surgery completed  Patient stated she has no other ankle fracture     Patient had completed radiographic workup with no signs of CNS demyelination in brain and cervical spine, with EMG studies completed previously suggestive against peripheral polyneuropathy, radiculopathy, myopathy     Last imaging completed in June 2021 with no progression of her white matter disease appreciated since November 2020  We may consider imaging studies on annual basis if clinically indicated  Patient has admission for DKA with generalized body weakness has been gradually improving  Patient has been experiencing neurological manifestations of multiple rheumatological and autoimmune disorders,  With preserved reflexes lower extremities suggestive against peripheral polyneuropathy  The following portions of the patient's history were reviewed and updated as appropriate:   She  has a past medical history of Abdominal pain, Anxiety, Anxiety and depression, COVID-19 (12/2020), Depression, Eating disorder, Fracture of fibula, Gilbert disease, Hashimoto's disease (2/21/2020), Head injury, Nasal congestion, PTSD (post-traumatic stress disorder), Rectal bleed, Seizures ([facility] [address] ), and Type 1 diabetes ([facility] [address] )    She   Patient Active Problem List    Diagnosis Date Noted    Other chest pain 02/22/2022    Physical deconditioning 12/21/2021    Right-sided back pain 12/21/2021    Verruca 09/23/2021    Nail abnormality 09/01/2021    Primary hypothyroidism 08/18/2021    Diabetes mellitus type 1, uncomplicated, on long term insulin pump (HCC) 07/17/2021    Elevated prolactin level 07/14/2021    Medicare annual wellness visit, subsequent 06/29/2021    Fatty stools 06/29/2021    Left low back pain 06/29/2021    Hyperlipidemia 05/19/2021    Visual hallucinations 05/11/2021    DKA (diabetic ketoacidoses) 05/11/2021    Rheumatoid factor positive 04/29/2021    History of anesthesia complications 01/91/5545    Seizures ([facility])     Positive SOHAM (antinuclear antibody) 03/09/2021    Weakness generalized 02/11/2021    Low serum vitamin B12 02/11/2021    Arthralgia 02/11/2021    Bipolar affective disorder ([facility] [address] ) 01/13/2021    Cough 12/31/2020    Vaginal discharge 12/01/2020    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    Yeast infection 12/01/2020    Vulvar cellulitis 11/04/2020    Vulvovaginitis 11/03/2020    Syncope     Chronic back pain 10/16/2020    Insulin pump status 09/30/2020    Muscle weakness 09/03/2020    Word finding difficulty 09/03/2020    Neuropathy 06/23/2020    Polyarthritis 06/23/2020    Secondary amenorrhea 06/23/2020    Vitamin D deficiency 06/19/2020    Paresthesia of left leg 04/21/2020    Hashimoto's disease 02/21/2020    Dysuria 02/21/2020    Amenorrhea 02/21/2020    Visual changes 01/21/2020    Chronic pain of right knee 10/15/2019    Chronic abdominal pain 10/15/2019    Blood in the stool 10/15/2019    OCD (obsessive compulsive disorder) 09/26/2019    Activity intolerance 09/10/2019    Chronic post-traumatic stress disorder (PTSD) 08/12/2019    Generalized anxiety disorder with panic attacks 08/12/2019    Agitation 08/06/2019    Concussion without loss of consciousness 07/02/2019    Urinary frequency 06/06/2019    Hypoglycemia 06/05/2019    Abnormal stools 02/07/2019    Uncontrolled type 1 diabetes mellitus with hypoglycemia without coma ([facility] [address] ) 01/29/2019    Abnormal liver function test 12/13/2018    Chronic RUQ pain 12/13/2018    Chronic fatigue and malaise 12/13/2018    Patellofemoral pain syndrome of right knee 09/26/2018     She  has a past surgical history that includes Wrist surgery; [place] tooth extraction; Nasal septoplasty w/ turbinoplasty; Knee surgery (Right, 07/06/2020); Sinus surgery; Turbinoplasty (N/A, 3/22/2021); Colonoscopy; Upper gastrointestinal endoscopy; and Skin biopsy  Her family history includes Alcohol abuse in her brother and father; Arthritis in her mother; Cancer in her family; Cholelithiasis in her father; Cirrhosis in her father; Coronary artery disease in her father; Depression in her mother; Diabetes in her family, father, and mother; Diabetes type II in her mother; Hashimoto's thyroiditis in her sister;  Hyperlipidemia in her father and mother; Hypertension in her family, father, and mother; Migraines in her mother; Nephrolithiasis in her father; Sarcoidosis in her father; Thyroid disease in her sister; Varicose Veins in her mother  She  reports that she has never smoked  She has never used smokeless tobacco  She reports that she does not drink alcohol and does not use drugs  Current Outpatient Medications   Medication Sig Dispense Refill    Altavera 0 15-30 MG-MCG per tablet TAKE 1 TABLET BY MOUTH EVERY DAY 84 tablet 0    clonazePAM (KlonoPIN) 0 5 mg tablet Take 1 tablet (0 5 mg total) by mouth 2 (two) times a day 45 tablet 2    gabapentin (Neurontin) 300 mg capsule Take 1 capsule (300 mg total) by mouth daily at bedtime 60 capsule 0    hydroxychloroquine (PLAQUENIL) 200 mg tablet TAKE 1 TABLET (200 MG TOTAL) BY MOUTH 2 (TWO) TIMES A DAY WITH MEALS 60 tablet 5    insulin aspart (NovoLOG) 100 units/mL injection Inject  Units under the skin      levothyroxine 25 mcg tablet Take 1 tablet (25 mcg total) by mouth daily 60 tablet 0    metFORMIN (GLUCOPHAGE-XR) 500 mg 24 hr tablet 1,000 mg 2 (two) times a day with meals        pantoprazole (PROTONIX) 40 mg tablet Take 1 tablet (40 mg total) by mouth in the morning  90 tablet 3    Ciclopirox 1 % shampoo  (Patient not taking: Reported on 6/8/2022)      colchicine (COLCRYS) 0 6 mg tablet TAKE 1 TABLET BY MOUTH 2 TIMES A DAY  90 tablet 0    ibuprofen (MOTRIN) 400 mg tablet Take 1 tablet (400 mg total) by mouth every 6 (six) hours as needed for mild pain for up to 3 days 12 tablet 0    Insulin Pen Needle (BD PEN NEEDLE NASIM U/F) 32G X 4 MM MISC by Does not apply route 4 (four) times a day for 180 days 400 each 3     No current facility-administered medications for this visit       Current Outpatient Medications on File Prior to Visit   Medication Sig    Altavera 0 15-30 MG-MCG per tablet TAKE 1 TABLET BY MOUTH EVERY DAY    clonazePAM (KlonoPIN) 0 5 mg tablet Take 1 tablet (0 5 mg total) by mouth 2 (two) times a day    hydroxychloroquine (PLAQUENIL) 200 mg tablet TAKE 1 TABLET (200 MG TOTAL) BY MOUTH 2 (TWO) TIMES A DAY WITH MEALS    insulin aspart (NovoLOG) 100 units/mL injection Inject  Units under the skin    levothyroxine 25 mcg tablet Take 1 tablet (25 mcg total) by mouth daily    metFORMIN (GLUCOPHAGE-XR) 500 mg 24 hr tablet 1,000 mg 2 (two) times a day with meals      pantoprazole (PROTONIX) 40 mg tablet Take 1 tablet (40 mg total) by mouth in the morning   Ciclopirox 1 % shampoo  (Patient not taking: Reported on 6/8/2022)    colchicine (COLCRYS) 0 6 mg tablet TAKE 1 TABLET BY MOUTH 2 TIMES A DAY   ibuprofen (MOTRIN) 400 mg tablet Take 1 tablet (400 mg total) by mouth every 6 (six) hours as needed for mild pain for up to 3 days    Insulin Pen Needle (BD PEN NEEDLE NASIM U/F) 32G X 4 MM MISC by Does not apply route 4 (four) times a day for 180 days     No current facility-administered medications on file prior to visit  She is allergic to iodine - food allergy and insulin glargine            Objective:    Blood pressure 109/73, pulse 82, temperature 97 6 °F (36 4 °C), temperature source Skin, height 5' 1 75" (1 568 m), weight 71 2 kg (157 lb), not currently breastfeeding  Physical Exam    Neurological Exam    CONSTITUTIONAL: NAD, pleasant  NECK: supple, no lymphadenopathy, no thyromegaly, no JVD  CARDIOVASCULAR: RRR, normal S1S2, no murmurs, no rubs  RESP: clear to auscultation bilaterally, no wheezes/rhonchi/rales  ABDOMEN: soft, non tender, non distended  SKIN: no rash or skin lesions  EXTREMITIES: no edema, pulses 2+bilaterally  PSYCH: appropriate mood and affect  NEUROLOGIC COMPREHENSIVE EXAM: Patient is oriented to person, place and time, NAD; appropriate affect  CN II, III, IV, V, VI, VII,VIII,IX,X,XI-XII intact with EOMI, PERRLA, OKN intact, VF grossly intact, fundi poorly visualized secondary to pupillary constriction; symmetric face noted   Motor: 5/5 UE/LE bilateral symmetric, Left hand  3/5 ; Sensory: intact to light touch and pinprick bilaterally; normal vibration sensation feet bilaterally; Coordination within normal limits on FTN and [place] testing; DTR: 2/4 through, no Babinski, no clonus  Tandem gait is mildly abnormal  Romberg: absent  ROS:  12 points of review of system was reviewed with the patient and was unremarkable with exception: see HPI  Review of Systems   Constitutional: Negative  Negative for appetite change and fever  HENT: Negative  Negative for hearing loss, tinnitus, trouble swallowing and voice change  Eyes: Negative  Negative for photophobia and pain  Respiratory: Negative  Negative for shortness of breath  Cardiovascular: Negative  Negative for palpitations  Gastrointestinal: Negative  Negative for nausea and vomiting  Endocrine: Negative  Negative for cold intolerance  Genitourinary: Negative  Negative for dysuria, frequency and urgency  Musculoskeletal: Negative  Negative for myalgias and neck pain  Skin: Negative  Negative for rash  Neurological: Positive for weakness (both hands), numbness (left lower leg) and headaches  Negative for dizziness, tremors, seizures, syncope, facial asymmetry, speech difficulty and light-headedness  Hematological: Negative  Does not bruise/bleed easily  Psychiatric/Behavioral: Negative  Negative for confusion, hallucinations and sleep disturbance

## 2022-06-08 NOTE — PSYCH
Psychotherapy Provided: Individual Psychotherapy 50 minutes     Length of time in session: 50 minutes, follow up in 1 week    Encounter Diagnosis     ICD-10-CM    1  Mixed obsessional thoughts and acts  F42 2    2  Chronic post-traumatic stress disorder (PTSD)  F43 12    3  Bipolar disorder, current episode depressed, severe, without psychotic features ([facility] [address] )  F31 4        Goals addressed in session: Goal 1, Goal 2 and Goal 3      Pain:      moderate to severe    4    Current suicide risk : Low     D: Met with Jenny individually  Several choices need to be made regarding [facility]  Jerome Nicole is seriously thinking of remaining in [place]  Jerome Nicole has a favorable job where she would work from home - this would mean disability will be removed  Looking into a program through [facility] where one can do a trial to see if working is feasible  Medical issues need to be taken into account  Jerome Nicole will also be alone in apartment out of [place] most of the days  Sister suggested moving into her complex but it's an hour away from [address]  Denied SI    A: Jerome Nicole presented distracted with anxiety with all the decisions needed to be made  Appropriate due to above concerns    P: Continue individual therapy        Behavioral Health Treatment Plan  Luke: Diagnosis and Treatment Plan explained to Reed Beckford relates understanding diagnosis and is agreeable to Treatment Plan   Yes

## 2022-06-15 ENCOUNTER — SOCIAL WORK (OUTPATIENT)
Dept: BEHAVIORAL/MENTAL HEALTH CLINIC | Facility: CLINIC | Age: 27
End: 2022-06-15
Payer: COMMERCIAL

## 2022-06-15 DIAGNOSIS — F41.1 GENERALIZED ANXIETY DISORDER WITH PANIC ATTACKS: ICD-10-CM

## 2022-06-15 DIAGNOSIS — F42.2 MIXED OBSESSIONAL THOUGHTS AND ACTS: Primary | ICD-10-CM

## 2022-06-15 DIAGNOSIS — F31.4 BIPOLAR DISORDER, CURRENT EPISODE DEPRESSED, SEVERE, WITHOUT PSYCHOTIC FEATURES (HCC): ICD-10-CM

## 2022-06-15 DIAGNOSIS — F43.12 CHRONIC POST-TRAUMATIC STRESS DISORDER (PTSD): ICD-10-CM

## 2022-06-15 DIAGNOSIS — F41.0 GENERALIZED ANXIETY DISORDER WITH PANIC ATTACKS: ICD-10-CM

## 2022-06-15 PROCEDURE — 90834 PSYTX W PT 45 MINUTES: CPT | Performed by: SOCIAL WORKER

## 2022-06-15 NOTE — PSYCH
Psychotherapy Provided: Individual Psychotherapy 50 minutes     Length of time in session: 50 minutes, follow up in 1 week    Encounter Diagnosis     ICD-10-CM    1  Mixed obsessional thoughts and acts  F42 2    2  Generalized anxiety disorder with panic attacks  F41 1     F41 0    3  Chronic post-traumatic stress disorder (PTSD)  F43 12    4  Bipolar disorder, current episode depressed, severe, without psychotic features ([facility] [address] )  F31 4        Goals addressed in session: Goal 1, Goal 2 and Goal 3      Pain:      moderate to severe    4    Current suicide risk : Low     D: Met with Jenny individually  Decided not to move with Jignesh Naylor to [address] stated decision was based on finances, medical bills and emotional strain - Jignesh Naylor understood  [facility] will be unable to stay where they are living - possibly moving back home - not suggested due to dad's hx behavior and triggers - parents agreed to build a separate area for [facility] with her own living space  Fridge etc   Plans to fly back and forth being scheduled  Emotions have been cyclical - understands circumstances will be challenging - utilization of skills, therapy and acclimating  Going to [place] tomorrow to look at [facility] is keeping Jacoby Noon  Denied SI    A: [facility] presented articulate with emotions and how staying will benefit more than going to [place] for 1 year  Will watcg communication and triggers within the family home  P: Continue Individual therapy    Behavioral Health Treatment Plan [facility]: Diagnosis and Treatment Plan explained to Shante Glez relates understanding diagnosis and is agreeable to Treatment Plan   Yes

## 2022-06-21 NOTE — PROGRESS NOTES
Assessment and Plan:   Ms Gaby Downs a 51-year-old female with history significant for type 1 insulin-dependent diabetes mellitus diagnosed in 2019 and Hashimoto's thyroiditis diagnosed in 2020, who presents for follow-up of an undifferentiated connective tissue disorder (diagnosed based on a positive SOHAM 1:80 nuclear, speckled, homogeneous patterns, arthralgias, malar rash/photosensitivity)  She is currently on hydroxychloroquine 200 mg twice daily  # Undifferentiated connective tissue disease  - Mukul Melendez presents today for follow-up of an undifferentiated connective tissue disease for which she was started on hydroxychloroquine in July 2021 and states with initiation of the medication she has overall noticed a significant improvement in her fatigue and arthralgias  She has been doing fairly well but still experiences some degree of joint pain on a daily basis which she is able to work through  She also notices a flare-up in symptoms with sun exposure  We discussed multiple options today and as her symptoms are still occurring on a daily basis, although they are mild in nature we can trial her on an additional DMARD medication and monitor for improvement in her symptoms  I will plan to start her on methotrexate titrated to 15 mg once weekly with folic acid 1 mg daily  I reviewed the side effects of methotrexate including but not limited to, risk of infections, GI upset, mucositis and need for bone marrow/liver/kidney monitoring  She is aware not to combine methotrexate with alcohol due to concerns for hepatotoxicity  I requested she repeat a CBC and CMP in 4 weeks after initiation of methotrexate  She is also aware that the methotrexate is a teratogen and reports that both her and her fiance utilize contraception (birth control and condoms)    She will still continue the hydroxychloroquine 200 mg twice daily and follow-up with her ophthalmologist for annual eye exams     - I will see her back for a follow-up in 3-4 months to assess her response to the hydroxychloroquine and methotrexate combination        Plan:  Diagnoses and all orders for this visit:    Undifferentiated connective tissue disease ([facility] )  -     CBC and differential; Future  -     Comprehensive metabolic panel; Future  -     C-reactive protein; Future  -     Sedimentation rate, automated; Future  -     C4 complement; Future  -     C3 complement; Future  -     Anti-DNA antibody, double-stranded; Future  -     Urinalysis with microscopic  -     Protein / creatinine ratio, urine  -     methotrexate 2 5 mg tablet; Take 4 tabs once weekly x 4 weeks, then increase to 6 tabs once weekly  -     folic acid (FOLVITE) 1 mg tablet; Take 1 tablet (1 mg total) by mouth daily    Long-term use of Plaquenil    Methotrexate, long term, current use    Elevated rheumatoid factor    Hashimoto's disease    Type 1 diabetes mellitus without complication ([facility] )      Activities as tolerated  Exercise: try to maintain a low impact exercise regimen as much as possible  Walk for 30 minutes a day for at least 3 days a week  Continue other medications as prescribed by PCP and other specialists  RTC in 3-4 months          HPI    INITIAL VISIT NOTE (6/2021):  Ms Jayesh Carlson a 73-year-old female with history significant for type 1 insulin-dependent diabetes mellitus diagnosed in 2019 and Hashimoto's thyroiditis (elevated thyroid microsomal and thyroglobulin antibodies) diagnosed in 2020, who presents for further evaluation of a positive SOHAM 1:80 nuclear, speckled, homogeneous patterns and rheumatoid factor of 10, in addition to widespread joint pains   She is referred by MARGARET Dalal for a rheumatology consult        Patient reports she started to feel unwell approximately 2 years ago and further evaluation for this led to the diagnosis of type 1 diabetes as well as the Hashimoto's thyroiditis  David Baldwin has been managing the diabetes with insulin and states for the hypothyroidism as a result of Hashimoto's thyroiditis she was only started on levothyroxine 25 mcg once daily approximately 1 month ago  Valeriy Dalton has been a conflict between the endocrinologists she has seen in regards to starting the levothyroxine, but due to progressive complaints which the patient and her primary care physician felt was related to hypothyroidism she was finally started on thyroid supplementation 1 month ago  David Baldwin has not noticed a change in her symptoms so far and has not had a TSH rechecked yet   The TSH was slightly elevated at 5 24 on 5/1 prior to starting the levothyroxine   She is also scheduled to see a private endocrinologist for another opinion      She reports over the past 1 and half years she has also started to experience joint and muscle pain which has been gradually progressive   She experiences a degree of pain on a daily basis but states that her symptoms can spontaneously flare-up as well as with changes in the weather, especially when it is cold/damp/humid  David Baldwin does feel better with the warmer weather and has also started utilizing a therapy pool which does help with her symptoms  David Baldwin has noticed joint pains to affect her hands occasionally but prominently in her wrists   She will notice pain in her shoulders and hips mostly with manipulation and range of motion   She describes chronic pain that will also affect her knees as well as in her ankles and feet   At times she will notice a muscle pain throughout her upper and lower extremities as well   She has noticed muscle cramps to affect her hands and feet   No significant joint pains in her elbows or low back   She has not noticed any obvious swelling of her wrists but feels like they may be swollen as she can gauge this by her bracelet  Davidterence Baldwin has also noticed swelling to affect her ankles   She does experience morning stiffness which affects her diffusely and takes about 30-40 minutes to improve   She tries to avoid Tylenol as this affects her blood glucose monitoring   She has tried ibuprofen for her symptoms which has not helped significantly      Other than the body pain she also describes chronic fatigue, unintentional weight gain and hair thinning   She denies fevers, unintentional weight loss, focal alopecia, dry eyes, dry mouth, inflammatory eye disease, skin rash, psoriasis, photosensitivity, mouth/nose ulcers, swollen glands, pleuritic chest pain, shortness of breath, inflammatory bowel disease, blood clots (reports a history of 1 very early miscarriage), Raynaud's or a family history of autoimmune disease      Testing done for her symptoms showed the positive SOHAM and borderline positive rheumatoid factor   A rheumatoid factor was negative in 2019   An ESR, CRP, anti CCP antibody, CK, Lyme antibody profile, anti neutrophilic cytoplasmic antibodies, Sjogren's antibodies and anti double-stranded DNA antibody were normal   An MRI of her right knee and ultrasound of her right Achilles tendon in 2018 were normal      In view of her complaints she was also evaluated by Neurology to rule out multiple sclerosis and currently there is no specific diagnosis from their perspective   She did have an MRI of her brain done last year which showed a single focus of white matter change which has been stable since 2014 7/13/2021:  Patient presents for a follow-up of a positive SOHAM  I reviewed her workup done following the last office visit which showed an unremarkable SOHAM specificity, C3, C4, antiphospholipid antibody testing, urinalysis, urine protein creatinine ratio, vitamin-D, CK, anti CCP antibody and HLA B27 antigen  X-rays of her hands and feet were unremarkable        She reports there has been no change in her symptoms since the last office visit and she continues to describe the chronic fatigue, muscle aches/spasms as well as ongoing joint pains    She is scheduled for another endocrinology opinion tomorrow to see if any of her symptoms may be related to the underlying hypothyroidism, but this is not felt to be the case so far       2/16/2022:  Patient presents for follow-up of an undifferentiated connective tissue disease  She is currently on hydroxychloroquine 200 mg twice daily that was started in July 2021  She reports in about September she started to notice a significant improvement in her well-being with being on the hydroxychloroquine  There was an improvement in her fatigue and joint pains  She had overall been doing well up until January but after she received the COVID-19 booster vaccination noticed a flare-up in her symptoms with more fatigue as well as significant joint pains which mostly affected her hands, wrists, knees and ankles  She has been noticing intermittent swelling of her fingers as well as ankles/feet  She has been taking Tylenol alternating with ibuprofen but is unsure if the medications are specifically helping her or if the side effects as a result of the vaccination are gradually improving on their own  She was also evaluated by Gastroenterology and had an upper endoscopy done which showed chronic gastritis so she was advised to minimize NSAID use  No recent steroids  Except for the pain she has also had a few occurrences of redness on her face in a butterfly pattern as well as affecting her forehead  She has noticed photosensitivity and states while she was at the beach in August she developed a skin rash in sun-exposed areas  She has been more careful with applying sunscreen as well as covering up in the sun  She denies fevers, weight loss, alopecia, mouth/nose ulcers, swollen glands or pleuritic chest pain  6/22/2022:  Patient presents for follow-up of an undifferentiated connective tissue disease  She is currently on hydroxychloroquine 200 mg twice daily that was started in July 2021    I reviewed her blood work from February which showed a normal CBC, CMP, ESR, CRP, C3, C4, double-stranded DNA antibody, urinalysis and urine protein creatinine ratio  She reports overall since the last office visit she has been fairly stable except for an episode of pericarditis following the COVID-19 booster vaccination in January  She was seen by Cardiology and prescribed colchicine to take for 3 months  She reports the symptoms have mostly resolved and only on occasion will she notice some chest pain which is not long-lasting  She reports with sun exposure she will start to notice more joint pains and fatigue  For the most part she deals with some degree of joint pain on a daily basis and still notices swelling affecting her hands and ankles  She will be starting occupational therapy as due to the joint pain in her hands she has started to feel weakness in her hand strength  No fevers, unintentional weight loss, skin rashes or mouth/nose ulcers  Although she still endorses symptoms she reports overall she has been feeling much better since starting the hydroxychloroquine  The following portions of the patient's history were reviewed and updated as appropriate: allergies, current medications, past family history, past medical history, past social history, past surgical history and problem list       Review of Systems  Constitutional: Negative for weight change, fevers, chills, night sweats  Positive for fatigue  ENT/Mouth: Negative for hearing changes, ear pain, nasal congestion, sinus pain, hoarseness, sore throat, rhinorrhea, swallowing difficulty  Eyes: Negative for pain, redness, discharge, vision changes  Cardiovascular: Negative for chest pain, SOB, palpitations  Respiratory: Negative for cough, sputum, wheezing, dyspnea  Gastrointestinal: Negative for nausea, vomiting, diarrhea, constipation, pain, heartburn  Genitourinary: Negative for dysuria, urinary frequency, hematuria  Musculoskeletal: As per HPI  Skin: Negative for skin rash, color changes     Neuro: Negative for weakness, numbness, tingling, loss of consciousness  Psych: Negative for anxiety, depression  Heme/Lymph: Negative for easy bruising, bleeding, lymphadenopathy  Past Medical History:   Diagnosis Date    Abdominal pain     Anxiety     Anxiety and depression     COVID-19 12/2020    Depression     Eating disorder     history of anorexia/bulemia [id number]    Fracture of fibula     R Salter I   Iram Gavel disease     Hashimoto's disease 2/21/2020    Head injury     Nasal congestion     PTSD (post-traumatic stress disorder)     Rectal bleed     Seizures (HCC)     Type 1 diabetes ([facility] [address] )        Past Surgical History:   Procedure Laterality Date    COLONOSCOPY      KNEE SURGERY Right 07/06/2020    NASAL SEPTOPLASTY W/ TURBINOPLASTY      SINUS SURGERY      SKIN BIOPSY      TURBINOPLASTY N/A 3/22/2021    Procedure: TURBINOPLASTY;  Surgeon: Debi Cotton MD;  [facility]: [facility];  Service: ENT    UPPER GASTROINTESTINAL ENDOSCOPY      WISDOM TOOTH EXTRACTION      WRIST SURGERY      left;  Excision of ganglion       Social History     Socioeconomic History    Marital status: Single     Spouse name: Not on file    Number of children: 0    Years of education: Not on file    Highest education level: Associate degree: academic program   Occupational History    Occupation: unemployed   Tobacco Use    Smoking status: Never Smoker    Smokeless tobacco: Never Used    Tobacco comment: Tobacco smoke exposure (Father smokes cigars)   Vaping Use    Vaping Use: Never used   Substance and Sexual Activity    Alcohol use: Never    Drug use: Never    Sexual activity: Yes     Partners: Male     Birth control/protection: Condom Male   Other Topics Concern    Not on file   Social History Narrative    Student at [employer] a poor diet; low in vegetables, high in sweets    Dental care, regularly    Lives with parents    Sleeps 8-10 hours a day     Social Determinants of Health     Financial Resource Strain: Not on file   Food Insecurity: Not on file   Transportation Needs: Not on file   Physical Activity: Not on file   Stress: Not on file   Social Connections: Not on file   Intimate Partner Violence: Not on file   Housing Stability: Not on file       Family History   Problem Relation Age of Onset    Hypertension Mother     Migraines Mother         Headache    Diabetes type II Mother     Varicose Veins Mother     Hyperlipidemia Mother    Gabi Crystal Diabetes Mother    Gabi Crystal Arthritis Mother     Depression Mother     Cholelithiasis Father     Hypertension Father     Sarcoidosis Father         Liver    Hyperlipidemia Father     Diabetes Father     Coronary artery disease Father     Nephrolithiasis Father     Cirrhosis Father     Alcohol abuse Father     Thyroid disease Sister     Hashimoto's thyroiditis Sister     Cancer Family     Diabetes Family     Hypertension Family     Alcohol abuse Brother        Allergies   Allergen Reactions    Iodine - Food Allergy Throat Swelling    Insulin Glargine Other (See Comments)     LANTUS BRAND -Burning and redness under skin        Current Outpatient Medications:     folic acid (FOLVITE) 1 mg tablet, Take 1 tablet (1 mg total) by mouth daily, Disp: 90 tablet, Rfl: 1    methotrexate 2 5 mg tablet, Take 4 tabs once weekly x 4 weeks, then increase to 6 tabs once weekly  , Disp: 24 tablet, Rfl: 2    Altavera 0 15-30 MG-MCG per tablet, TAKE 1 TABLET BY MOUTH EVERY DAY, Disp: 84 tablet, Rfl: 0    Ciclopirox 1 % shampoo, , Disp: , Rfl:     clonazePAM (KlonoPIN) 0 5 mg tablet, Take 1 tablet (0 5 mg total) by mouth 2 (two) times a day, Disp: 45 tablet, Rfl: 2    colchicine (COLCRYS) 0 6 mg tablet, TAKE 1 TABLET BY MOUTH 2 TIMES A DAY , Disp: 90 tablet, Rfl: 0    gabapentin (Neurontin) 300 mg capsule, Take 1 capsule (300 mg total) by mouth daily at bedtime, Disp: 60 capsule, Rfl: 0    hydroxychloroquine (PLAQUENIL) 200 mg tablet, TAKE 1 TABLET (200 MG TOTAL) BY MOUTH 2 (TWO) TIMES A DAY WITH MEALS, Disp: 60 tablet, Rfl: 5    ibuprofen (MOTRIN) 400 mg tablet, Take 1 tablet (400 mg total) by mouth every 6 (six) hours as needed for mild pain for up to 3 days, Disp: 12 tablet, Rfl: 0    insulin aspart (NovoLOG) 100 units/mL injection, Inject  Units under the skin, Disp: , Rfl:     Insulin Pen Needle (BD PEN NEEDLE NASIM U/F) 32G X 4 MM MISC, by Does not apply route 4 (four) times a day for 180 days, Disp: 400 each, Rfl: 3    levothyroxine 25 mcg tablet, Take 1 tablet (25 mcg total) by mouth daily, Disp: 60 tablet, Rfl: 0    metFORMIN (GLUCOPHAGE-XR) 500 mg 24 hr tablet, 1,000 mg 2 (two) times a day with meals  , Disp: , Rfl:     pantoprazole (PROTONIX) 40 mg tablet, Take 1 tablet (40 mg total) by mouth in the morning , Disp: 90 tablet, Rfl: 3      Objective:    Vitals:    06/22/22 1506   BP: 120/80   Pulse: 86   SpO2: 99%   Weight: 71 2 kg (157 lb)   Height: 5' 1 75" (1 568 m)       Physical Exam  General: Well appearing, well nourished, in no distress  Oriented x 3, normal mood and affect  Ambulating without difficulty  Skin: Good turgor, no rash today, unusual bruising or prominent lesions  Hair: Normal texture and distribution  Nails: Normal color, no deformities  HEENT:  Head: Normocephalic, atraumatic  Eyes: Conjunctiva clear, sclera non-icteric, EOM intact  Extremities: No amputations or deformities, cyanosis, edema  Musculoskeletal:    There is no tenderness or soft tissue swelling noted today  Neurologic: Alert and oriented  No focal neurological deficits appreciated  Psychiatric: Normal mood and affect  DEVONTE Courtney    Rheumatology

## 2022-06-22 ENCOUNTER — OFFICE VISIT (OUTPATIENT)
Dept: RHEUMATOLOGY | Facility: CLINIC | Age: 27
End: 2022-06-22
Payer: COMMERCIAL

## 2022-06-22 VITALS
BODY MASS INDEX: 28.89 KG/M2 | WEIGHT: 157 LBS | SYSTOLIC BLOOD PRESSURE: 120 MMHG | HEIGHT: 62 IN | OXYGEN SATURATION: 99 % | DIASTOLIC BLOOD PRESSURE: 80 MMHG | HEART RATE: 86 BPM

## 2022-06-22 DIAGNOSIS — Z79.899 LONG-TERM USE OF PLAQUENIL: ICD-10-CM

## 2022-06-22 DIAGNOSIS — E10.9 TYPE 1 DIABETES MELLITUS WITHOUT COMPLICATION (HCC): ICD-10-CM

## 2022-06-22 DIAGNOSIS — E06.3 HASHIMOTO'S DISEASE: ICD-10-CM

## 2022-06-22 DIAGNOSIS — R76.8 ELEVATED RHEUMATOID FACTOR: ICD-10-CM

## 2022-06-22 DIAGNOSIS — M35.9 UNDIFFERENTIATED CONNECTIVE TISSUE DISEASE (HCC): Primary | ICD-10-CM

## 2022-06-22 DIAGNOSIS — Z79.899 METHOTREXATE, LONG TERM, CURRENT USE: ICD-10-CM

## 2022-06-22 PROCEDURE — 3008F BODY MASS INDEX DOCD: CPT | Performed by: INTERNAL MEDICINE

## 2022-06-22 PROCEDURE — 99215 OFFICE O/P EST HI 40 MIN: CPT | Performed by: INTERNAL MEDICINE

## 2022-06-22 PROCEDURE — 1036F TOBACCO NON-USER: CPT | Performed by: INTERNAL MEDICINE

## 2022-06-22 RX ORDER — FOLIC ACID 1 MG/1
1 TABLET ORAL DAILY
Qty: 90 TABLET | Refills: 1 | Status: SHIPPED | OUTPATIENT
Start: 2022-06-22

## 2022-06-24 ENCOUNTER — EVALUATION (OUTPATIENT)
Dept: OCCUPATIONAL THERAPY | Facility: CLINIC | Age: 27
End: 2022-06-24
Payer: COMMERCIAL

## 2022-06-24 ENCOUNTER — EVALUATION (OUTPATIENT)
Dept: PHYSICAL THERAPY | Facility: CLINIC | Age: 27
End: 2022-06-24
Payer: COMMERCIAL

## 2022-06-24 DIAGNOSIS — R53.1 WEAKNESS GENERALIZED: ICD-10-CM

## 2022-06-24 DIAGNOSIS — R27.8 DECREASED DEXTERITY: Primary | ICD-10-CM

## 2022-06-24 DIAGNOSIS — R27.8 DECREASED DEXTERITY: ICD-10-CM

## 2022-06-24 PROCEDURE — 97110 THERAPEUTIC EXERCISES: CPT

## 2022-06-24 PROCEDURE — 97162 PT EVAL MOD COMPLEX 30 MIN: CPT

## 2022-06-24 PROCEDURE — 97166 OT EVAL MOD COMPLEX 45 MIN: CPT

## 2022-06-24 NOTE — PROGRESS NOTES
OCCUPATIONAL THERAPY INITIAL EVALUATION:    2022  Hay Barakat  1995  [id number]  Yuri Villarreal, *  1  Decreased dexterity    2  Low serum vitamin B12    3  Arthralgia, unspecified joint      Pt was seen and treated by Moustapha Weiner OTS under direct supervision of DAVID Parsons, OTR/L  Subjective     "I am unsure if I can come here because of the co-pay"    PATIENT GOAL: "To use my hands more"      Assessment/Plan    Skilled Analysis:  Pt is a 24 y o  female referred to Occupational Therapy s/p Decreased dexterity [R27 8]  Pt participated in skilled OT evaluation and following formalized testing, presents with the following areas of deficit: FMC/FMS, GMC/GMS, in hand manipulation/dexterity and speed/ automaticity impacting indep and completion of ADL/IADL, salient, and leisure tasks  Pt does demo the need for skilled Occupational Therapy services 1-2x/week for 4 weeks with focus on UE strengthening, UE endurance, FMC/GMC, FMS/GMS and speed/automaticty, HEP development to address the goals as listed below  Due to pt's high copay, pt is unsure if she can afford therapy at this time  Pt provided with OTR contact information to contact next week regarding making appointments  Pt demo G understanding and plans to follow up next week once pt knows cost of a new medication she will be taking  Pt in agreement with POC, POC to  w/in 90 days     Goals:   Motor Short Term Goals (4 -6)WKS      Strength/Endurance    ·  Pt will increase b/l UE strength to 4/5 through the use of strengthening exercises and home program for eventual return to life and work roles and salient tasks    · Pt will demo with G tolerance to seated, and in stance exercise x 45 minutes with minimal rest breaks required for increased engagement in life roles and weekly exercise regimen     · Pt will demo with G carryover of Home Exercise Program to improve functional progression towards goals in Plan of care and for improved functional use of  b/l UE         FMC/GMC    · Pt will increase b/l UE rate of manipulation by 10% for all FM tests for improved functional performance with salient tasks     · Pt will increase Reaction time  to < 1 5 seconds with hand to target timed trials for improved reaction time and automaticity of coordination for improved functional performance with ADLs/IADLs and salient tasks    · Pt will increase b/l UE prehension patterns for improved utensil and container management  with <10% droppage         Functional Performance    · Pt will increase LUE to refined assist with for ADL/IADL and tabletop tasks for improved functional performance of life roles and salient tasks     ·  Pt will increase proprioception of RUE hand to target for improved functional reach vision occluded with ADL tasks  x 95% accuracy           Treatment Interventions  Seated, and in stance neuro re-ed  Fxnl Grasp and Release  FMC/prehension patterns  Fxnl Tool use (tweezers, tongs)  In hand manipulation  Timed Trials to improve automaticity  Hand to target tasks  Seated functional reach: crossing midline  WBearing strategies  (seated, Prone/quad)  Closed chain activities  Open chain activities  HEP Development (Theraputty, therabands)      HISTORY OF PRESENT ILLNESS:     Pt is a 24 y o  female who was referred to Occupational Therapy s/p  Decreased dexterity [R27 8]  Pt was referred to OP OT by neurology due to challenges with hand dexterity and b/l UE weakness  Pt reports she has difficulty with opening cans, squeezing bottles, specifically condiments and her shampoo bottle  She also experiences joint pain, which limits her ability to participate in activities throughout her day depending on the severity of the pain  She reports she is starting a new medication this weekend to help with the joint pain  Pt reports she has difficulty typing and recently graduated from college as a business major   She is applying for remote jobs and has noticed that her typing has become slower, and more difficult due to the pain and weakness in her hands  Pt also reports difficulty with carrying heavy grocery bags in her hands  She currently lives with her fiance in a 2 story home with the bedroom and bathroom on the second floor  When she is experiencing increased pain she is unable to navigate the stairs independently and requires assistance  When her fiance is away her mother or sister stays with her to provide assistance as needed  Xavi Richey enjoys watching television, reading, and spending time outside when it is nice out  Pt reported concern with cost of co-pay for therapy due to increased medical bills and is currently on disability which is limiting income         PMH:   Past Medical History:   Diagnosis Date    Abdominal pain     Anxiety     Anxiety and depression     COVID-19 12/2020    Depression     Eating disorder     history of anorexia/bulemia [id number]    Fracture of fibula     R Salter I   Mykel Craig disease     Hashimoto's disease 2/21/2020    Head injury     Nasal congestion     PTSD (post-traumatic stress disorder)     Rectal bleed     Seizures (HCC)     Type 1 diabetes (HCC)      Pain Levels:     Resting:  3 (joint)    With Activity:  6    Objective    Impairment Observations:    Upper Extremity       RUE LUE Comments                 UPPER EXTREMITY FXN Impaired Impaired Dominant Hand: R                         /Pinch Strength         Dynamometer      - Gross Grasp 20 lbs 5 lbs abnormal   Pinch Meter       - PINCER 6 5 lbs 3 lbs     - TRIPOD 8 lbs 7 lbs     - LATERAL 10 5 lbs  9 lbs              AROM (seated)  WNL     Elevation/Depression      Shoulder Flex/Ext      Shoulder Abd      Shoulder Add      Horizontal Abd      Horizontal Add      Elbow Flex      Elbow Ext      Pronation      Supination      Wrist Flex      Wrist Ext      Digit Flex      Digit Ex      Composite Grasp      Hook Grasp      Opposition      Subluxation MMT         Elevation 4/5 4/5    Shoulder Flex/Ext 3+/5 3/5    Shoulder Abd 3/5 3/5    Shoulder ADd 3/5 3/5    Elbow Flex 3+/5 3/5    Elbow Ext 3/5 3/5    Wrist Flex 3/5 3-/5    Wrist Ext 3+/5 3/5    Gross Grasp 3/5 3/5          SENSATION      Myofilaments (3 61 Wnl) 2 83 2 83    Sharp Dull       Proprioception Impaired Intact Slightly off target on R   Hot/Cold Temp Intact Intact          COORDINATION      9 Hole Peg Test 21 seconds 23 seconds abnormal   Immature grasp noted on L side   Fxnl Dexterity Test 30 seconds 28 seconds abnormal   Jebsen Hand fxn         - Handwriting 10 seconds 26 seconds within normal limits      - Card Turning 4 seconds 4 seconds within normal limits      - Small Item p/u 6 seconds 10 seconds RUE WNL; LUE low      - Simulated Feeding 7 seconds 14 seconds RUE WNL; LUE low      - Stacking Checkers 3 seconds 5 seconds RUE WNL; LUE slightly below normal limits      - Moving Light Objects 3 seconds 4 seconds within normal limits      - Moving Heavy Objects 4 seconds 5 seconds RUE slightly below normal limits; LUE low     Treatment Today: Therapeutic exercise (10 minutes)    Theraputty Exercises   Patient educated and instructed on HEP for FMS/FMC with use of mixture of light and medium resist theraputty to inc indep with ADL fxn including container management, fastener management, and item retrieval  Pt provided with demonstration and verbal instruction and demo G understanding of task   Exercises instructed as followed:  · Putty Squeezes - 10 reps - 3 sets - 1x daily - 3x weekly  · Tip Pinch with Putty - 10 reps - 3 sets - 1x daily - 3x weekly  · Finger Lumbricals with Putty - 10 reps - 3 sets - 1x daily - 3x weekly  · 3-Point Pinch with Putty - 10 reps - 3 sets - 1x daily - 3x weekly  · Finger Key  with Putty - 10 reps - 3 sets - 1x daily - 3x weekly  · Thumb Opposition with Putty - 10 reps - 3 sets - 1x daily - 3x weekly  · Digit extension with Putty- 10 reps- 3 sets- 1x daily- 3x weekly    INTERVENTION COMMENTS:  Diagnosis: Decreased dexterity [R27 8]  Precautions: FALL RISK, HX of seizures NO STIM  FOTO: to complete  Insurance: Payor: [employer]  REP / Plan: Soham MENENDEZ  REP / Product Type: Medicare PPO /   1 of ___ visits, PN due 7/24/22

## 2022-06-24 NOTE — PROGRESS NOTES
PT Evaluation     Today's date: 2022  Patient name: Rosaline Escamilla  : 1995  MRN: [id number]  Referring provider: Yaquelin Mao, *  Dx:   Encounter Diagnosis     ICD-10-CM    1  Weakness generalized  R53 1 Ambulatory Referral to Physical Therapy   2  Decreased dexterity  R27 8 Ambulatory Referral to Physical Therapy                  Assessment  Assessment details: Rosaline Escamilla is a pleasant 24 y o  female who was referred to outpatient physical therapy with the chief complaint of generalized muscle weakness, fatigue, and joint pain that has worsened over the past few years  She reports multiple falls at home each month as her knees with either buckle or she will trip over her own toes  She presents with the goal to increase the strength in her legs  PT examination findings include: slow gait speed; decreased LE strength and power as shown by her 5x STS time; and limited endurance given her 6 MWT distance  Balance testing via TUG and FGA were normal and do not classify her as an increased falls risk  Her falls are likely attributed to muscle weakness and fatigue    She was provided with initial HEP for LE strengthening with proper form demonstrated throughout  She reported appropriate location of muscle burn with each exercise  Time was also spent on educating her on the importance of gradually increasing her activity level, even on bad days, and challenging herself with walking hills or varying her walking speed when she goes for her walks  She verbalized understanding and was instructed on how to modify dosage, intensity, and frequency of exercises as needed based on tolerance  The patient would benefit from skilled physical therapy to provide exercises, neuromuscular reeducation, gait training, manual techniques, and modalities as deemed necessary in order to help achieve her goals and reach her maximal functional capacity         Impairments: activity intolerance, impaired physical strength, lacks appropriate home exercise program and pain with function  Understanding of Dx/Px/POC: good   Prognosis: good    Goals  STGs in 4 weeks  1  Patient will be independent with HEP  2  Patient will improve 5x STS time to no more than 12 seconds for improved LE strength and power  LTGs in 8 weeks  1  Patient will improve her 6 MWT distance to at least 1500 ft for improved endurance  2  Patient will increase her gait speed to at least 1 2 m/s for improved community ambulation  3  Patient will be consistent with HEP and gym workouts to build strength and endurance  4  Patient will report no more than 2 falls over past 4 weeks  Plan  Plan details: Given high co-pay and her other monthly expenses, she is unsure how often she will be able to attend PT/OT  Discussed 1x/week or 1x every other week to progress exercises and monitor tolerance  She will call to schedule appointments once she figures out costs  Patient would benefit from: skilled physical therapy and PT eval  Planned therapy interventions: joint mobilization, manual therapy, body mechanics training, abdominal trunk stabilization, neuromuscular re-education, patient education, postural training, strengthening, stretching, therapeutic activities, therapeutic exercise, gait training, graded activity, graded exercise and home exercise program  Frequency: 1x week  Duration in weeks: 8  Plan of Care beginning date: 6/24/2022  Plan of Care expiration date: 8/19/2022  Treatment plan discussed with: patient        Subjective Evaluation    History of Present Illness  Mechanism of injury: Jessica Kurtz states that for the past 1 5-2 years, she started to experiencing globalized muscle weakness  She said that her legs are doing better as she is trying to walk more  She says that she will walk when the weather is nice  If its bad weather, she may occasionally go to the gym at [employer] and occasionally use machines   This depends on how her joints are feeling  She does have joint pain throughout so this may limit her at times  She was prescribed a new medication to see if this helps with the joint pain  She does report multiple falls at home due to the muscle weakness and her knees buckling under her and tripping over her feet  She estimates maybe 1-2 falls a month  She does not wear sneakers a lot as she is sensitive to socks on her feet or swollen feet  Her goals are to have stronger legs  Pain  Current pain rating: 3  At best pain ratin  At worst pain ratin  Location: joint pain  Quality: sharp    Social Support  Steps to enter house: yes (2 HOMERO + door threshold)  Stairs in house: yes (14 stairs with partial HR and partial half wall)   Lives in: multiple-level home  Lives with: significant other    Employment status: not working  Patient Goals  Patient goals for therapy: increased strength and improved balance          Objective       Balance Test    6 Minute Walk Test (ft):  IE : 1340 ft   Functional Gait Assessment:  IE : score 30/30   Gait Speed (m/s):  IE : 10m/8 7s = 1 15 m/s   5x Sit To Stand (s):  IE : 15 9 (UEs crossed; good eccentric control)   TUG:  IE : 9 1       Coordination Left Right   Heel To [place]co  WNL--slight decreased speed  WNL       Sensation Left Right   Light Touch  Diminished lateral lower leg  Intact        Manual Muscle Testing - Hip Left Right   Flexion 4 4+   External Rotation 4 4+     Manual Muscle Testing - Knee Left Right   Flexion 4 4+   Extension 4 4+     Manual Muscle Testing - Ankle Left Right   Doriflexion 4 4+   Plantarflexion 4 4     **difficulty maintaining contraction**     Gait Assessment:    Intermittent decreased foot clearance; random excessive R knee extension during swing phase (pt states that sometimes it just gets stuck); minor L lateral trunk lean during stance           Short Term Goal Expiration Date:(2022)  Long Term Goal Expiration Date: (2022)  POC Expiration Date: (8/16/2022)         Precautions falls risk; DM       Manuals 6/24                                       Neuro Re-Ed                                                                 Ther Ex        Squats  x10       Stand hip abd x10 ea       Stand hip ext  x10 ea       Stand march x10 ea       HR x10       Lunges         TM                Ther Activity                        Gait Training                        Modalities                           Access Code: [email or web address]  URL: [email or web address]  Date: 06/24/2022  Prepared by: Vandana Chen    Exercises  · Mini Squat - 1 x daily - 7 x weekly - 2-3 sets - 10-15 reps - 2 hold  · Standing Hip Abduction with Anterior Support - 1 x daily - 7 x weekly - 2-3 sets - 10-15 reps - 2 hold  · Standing Hip Extension with Chair - 1 x daily - 7 x weekly - 2-3 sets - 10-15 reps - 2 hold  · Standing Marching - 1 x daily - 7 x weekly - 2-3 sets - 10-15 reps - 2 hold  · Standing Heel Raise - 1 x daily - 7 x weekly - 2-3 sets - 10-15 reps - 2 hold

## 2022-06-29 ENCOUNTER — TELEPHONE (OUTPATIENT)
Dept: OTHER | Facility: OTHER | Age: 27
End: 2022-06-29

## 2022-06-29 ENCOUNTER — TELEPHONE (OUTPATIENT)
Dept: GASTROENTEROLOGY | Facility: AMBULARY SURGERY CENTER | Age: 27
End: 2022-06-29

## 2022-06-29 NOTE — TELEPHONE ENCOUNTER
Called [facility]  Made aware appeal denied as patient does not meet criteria for approval and they would set up peer to peer

## 2022-06-30 ENCOUNTER — TELEPHONE (OUTPATIENT)
Dept: GASTROENTEROLOGY | Facility: AMBULARY SURGERY CENTER | Age: 27
End: 2022-06-30

## 2022-06-30 DIAGNOSIS — K63.89 SMALL INTESTINAL BACTERIAL OVERGROWTH (SIBO): Primary | ICD-10-CM

## 2022-06-30 NOTE — TELEPHONE ENCOUNTER
Ref #[id number]  Fax was sent to office with info requested    Please review and send info  Fax:  [phone]

## 2022-06-30 NOTE — TELEPHONE ENCOUNTER
Off the phone doing patient can fax in with .  She is going to approve her Xifaxan she tell me to read to her usual pharmacy so I just tenderness script to [facility] pharmacy she is going to approve the medication.  Hopefully [facility] has a medication available.  If the medication needs to be sent elsewhere please message through [facility] provider because I am not doing Task tomorrow, it will be Leandro.  Thank you

## 2022-06-30 NOTE — TELEPHONE ENCOUNTER
Patients GI provider:  Dr. Collier    Number to return call: (  [phone]    Reason for call: Dr Singleton from [facility] called to do peer to peer on this patient    Scheduled procedure/appointment date if applicable: N/A

## 2022-07-05 ENCOUNTER — TELEPHONE (OUTPATIENT)
Dept: OBGYN CLINIC | Facility: HOSPITAL | Age: 27
End: 2022-07-05

## 2022-07-05 NOTE — TELEPHONE ENCOUNTER
This is not an expected side effect to the methotrexate  Is the numbness still persisting? If yes, she can hold the methotrexate and see if the symptoms improve and let me know  If the numbness has resolved then I recommend she continue with the methotrexate, thanks

## 2022-07-05 NOTE — TELEPHONE ENCOUNTER
Pt was seen in the ER due to numbness in her leg and hand  They stated that the pt should stop the Methotrexate   Pt would like to know what Dr Martha Hernandez opinion was on stopping this medication    #[phone]

## 2022-07-05 NOTE — TELEPHONE ENCOUNTER
Spoke with patient and relayed message per Dr Alaniz Mood, patient verbalized understanding  Patient is still having symptoms of numbness so she will hold the medication for now and call us next week with an update

## 2022-07-06 ENCOUNTER — SOCIAL WORK (OUTPATIENT)
Dept: BEHAVIORAL/MENTAL HEALTH CLINIC | Facility: CLINIC | Age: 27
End: 2022-07-06
Payer: COMMERCIAL

## 2022-07-06 ENCOUNTER — TELEPHONE (OUTPATIENT)
Dept: OCCUPATIONAL THERAPY | Facility: CLINIC | Age: 27
End: 2022-07-06

## 2022-07-06 DIAGNOSIS — F43.12 CHRONIC POST-TRAUMATIC STRESS DISORDER (PTSD): ICD-10-CM

## 2022-07-06 DIAGNOSIS — F41.0 GENERALIZED ANXIETY DISORDER WITH PANIC ATTACKS: ICD-10-CM

## 2022-07-06 DIAGNOSIS — F41.1 GENERALIZED ANXIETY DISORDER WITH PANIC ATTACKS: ICD-10-CM

## 2022-07-06 DIAGNOSIS — F42.2 MIXED OBSESSIONAL THOUGHTS AND ACTS: Primary | ICD-10-CM

## 2022-07-06 PROCEDURE — 90834 PSYTX W PT 45 MINUTES: CPT | Performed by: SOCIAL WORKER

## 2022-07-06 NOTE — TELEPHONE ENCOUNTER
Clinician called patient regarding scheduling additional appointments for OP OT/PT  Left message asking patient to return call to schedule appointments, informed patient if they do not call back, pt will be d/c from services
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
## 2022-07-11 NOTE — TELEPHONE ENCOUNTER
Spoke with patient and relayed message per Dr Katty Lewis, patient verbalized understanding with no further questions

## 2022-07-11 NOTE — TELEPHONE ENCOUNTER
Patient is calling stating her symptoms she was having stopped completely Friday/saturday  She would like to know if she should start taking the medication again?       CB: [phone]

## 2022-07-20 ENCOUNTER — SOCIAL WORK (OUTPATIENT)
Dept: BEHAVIORAL/MENTAL HEALTH CLINIC | Facility: CLINIC | Age: 27
End: 2022-07-20
Payer: COMMERCIAL

## 2022-07-20 DIAGNOSIS — F41.0 GENERALIZED ANXIETY DISORDER WITH PANIC ATTACKS: ICD-10-CM

## 2022-07-20 DIAGNOSIS — F43.12 CHRONIC POST-TRAUMATIC STRESS DISORDER (PTSD): ICD-10-CM

## 2022-07-20 DIAGNOSIS — F41.1 GENERALIZED ANXIETY DISORDER WITH PANIC ATTACKS: ICD-10-CM

## 2022-07-20 DIAGNOSIS — F42.2 MIXED OBSESSIONAL THOUGHTS AND ACTS: Primary | ICD-10-CM

## 2022-07-20 DIAGNOSIS — F31.4 BIPOLAR DISORDER, CURRENT EPISODE DEPRESSED, SEVERE, WITHOUT PSYCHOTIC FEATURES (HCC): ICD-10-CM

## 2022-07-20 PROCEDURE — 90834 PSYTX W PT 45 MINUTES: CPT | Performed by: SOCIAL WORKER

## 2022-07-20 NOTE — PSYCH
Psychotherapy Provided: Individual Psychotherapy 50 minutes     Length of time in session: 50 minutes, follow up in 1 week    Encounter Diagnosis     ICD-10-CM    1  Mixed obsessional thoughts and acts  F42 2    2  Generalized anxiety disorder with panic attacks  F41 1     F41 0    3  Chronic post-traumatic stress disorder (PTSD)  F43 12    4  Bipolar disorder, current episode depressed, severe, without psychotic features ([facility] [address] )  F31 4        Goals addressed in session: Goal 1, Goal 2 and Goal 3      Pain:      moderate to severe    5    Current suicide risk : Low     D: Met with Jenny moses  Medical issues continue - experiencing flare ups and swelling  Sugars increased due to this as well  'I'm upset that Annita Alexander is leaving soon '  Annita Alexander and Aditi Levine have been arguing over small things  Annita Alexander also made a list of items Aditi Levine has been doing wrong so she can work on it  Examples discussed  Denied SI    A: Aditi Levine presented with appropriate mood and affect for topics of discussion  This break may determine if marriage is the best option - Annita Alexander will be in a different environment and exposures being away for college  P: Continue individual therapy    Behavioral Health Treatment Plan [facility]: Diagnosis and Treatment Plan explained to Jones Cabrera relates understanding diagnosis and is agreeable to Treatment Plan   Yes

## 2022-07-25 NOTE — PROGRESS NOTES
PT Discharge    Today's date: 2022  Patient name: Jose De Jesus Cool  : 1995  MRN: [id number]  Referring provider: Efrain Ernandez, *  Dx:   Encounter Diagnosis     ICD-10-CM    1  Weakness generalized  R53 1 Ambulatory Referral to Physical Therapy     PT plan of care cert/re-cert   2  Decreased dexterity  R27 8 Ambulatory Referral to Physical Therapy     PT plan of care cert/re-cert       Toby Zhang has not attended skilled PT since initial evaluation on 2022  At the time, she was working on finding cost of medication in order to be able to afford PT/OT and was going to call to schedule appts  OT called her on 2022 as no contact from her at that point  She left a message and no return call was made  At this point, given length of time of lapse of care, she is discharged from skilled PT and would need a new referral in order to return  Unable to formally assess any progress towards subjective or objective goals

## 2022-07-26 ENCOUNTER — APPOINTMENT (OUTPATIENT)
Dept: LAB | Age: 27
End: 2022-07-26
Payer: COMMERCIAL

## 2022-07-26 DIAGNOSIS — M35.9 UNDIFFERENTIATED CONNECTIVE TISSUE DISEASE (HCC): ICD-10-CM

## 2022-07-26 LAB
ALBUMIN SERPL BCP-MCNC: 3.8 G/DL (ref 3.5–5)
ALP SERPL-CCNC: 57 U/L (ref 46–116)
ALT SERPL W P-5'-P-CCNC: 16 U/L (ref 12–78)
ANION GAP SERPL CALCULATED.3IONS-SCNC: 4 MMOL/L (ref 4–13)
AST SERPL W P-5'-P-CCNC: 16 U/L (ref 5–45)
BACTERIA UR QL AUTO: ABNORMAL /HPF
BASOPHILS # BLD AUTO: 0.03 THOUSANDS/ΜL (ref 0–0.1)
BASOPHILS NFR BLD AUTO: 0 % (ref 0–1)
BILIRUB SERPL-MCNC: 0.51 MG/DL (ref 0.2–1)
BILIRUB UR QL STRIP: NEGATIVE
BUN SERPL-MCNC: 11 MG/DL (ref 5–25)
C3 SERPL-MCNC: 105 MG/DL (ref 90–180)
C4 SERPL-MCNC: 21 MG/DL (ref 10–40)
CALCIUM SERPL-MCNC: 9.1 MG/DL (ref 8.3–10.1)
CHLORIDE SERPL-SCNC: 107 MMOL/L (ref 96–108)
CLARITY UR: ABNORMAL
CO2 SERPL-SCNC: 26 MMOL/L (ref 21–32)
COLOR UR: YELLOW
CREAT SERPL-MCNC: 0.88 MG/DL (ref 0.6–1.3)
CREAT UR-MCNC: 245 MG/DL
CRP SERPL QL: <3 MG/L
EOSINOPHIL # BLD AUTO: 0.08 THOUSAND/ΜL (ref 0–0.61)
EOSINOPHIL NFR BLD AUTO: 1 % (ref 0–6)
ERYTHROCYTE [DISTWIDTH] IN BLOOD BY AUTOMATED COUNT: 12.9 % (ref 11.6–15.1)
ERYTHROCYTE [SEDIMENTATION RATE] IN BLOOD: 4 MM/HOUR (ref 0–19)
GFR SERPL CREATININE-BSD FRML MDRD: 91 ML/MIN/1.73SQ M
GLUCOSE P FAST SERPL-MCNC: 155 MG/DL (ref 65–99)
GLUCOSE UR STRIP-MCNC: NEGATIVE MG/DL
HCT VFR BLD AUTO: 41.3 % (ref 34.8–46.1)
HGB BLD-MCNC: 13.4 G/DL (ref 11.5–15.4)
HGB UR QL STRIP.AUTO: NEGATIVE
IMM GRANULOCYTES # BLD AUTO: 0.04 THOUSAND/UL (ref 0–0.2)
IMM GRANULOCYTES NFR BLD AUTO: 1 % (ref 0–2)
KETONES UR STRIP-MCNC: NEGATIVE MG/DL
LEUKOCYTE ESTERASE UR QL STRIP: NEGATIVE
LYMPHOCYTES # BLD AUTO: 3.08 THOUSANDS/ΜL (ref 0.6–4.47)
LYMPHOCYTES NFR BLD AUTO: 42 % (ref 14–44)
MCH RBC QN AUTO: 28.5 PG (ref 26.8–34.3)
MCHC RBC AUTO-ENTMCNC: 32.4 G/DL (ref 31.4–37.4)
MCV RBC AUTO: 88 FL (ref 82–98)
MONOCYTES # BLD AUTO: 0.59 THOUSAND/ΜL (ref 0.17–1.22)
MONOCYTES NFR BLD AUTO: 8 % (ref 4–12)
MUCOUS THREADS UR QL AUTO: ABNORMAL
NEUTROPHILS # BLD AUTO: 3.52 THOUSANDS/ΜL (ref 1.85–7.62)
NEUTS SEG NFR BLD AUTO: 48 % (ref 43–75)
NITRITE UR QL STRIP: NEGATIVE
NON-SQ EPI CELLS URNS QL MICRO: ABNORMAL /HPF
NRBC BLD AUTO-RTO: 0 /100 WBCS
PH UR STRIP.AUTO: 5.5 [PH]
PLATELET # BLD AUTO: 215 THOUSANDS/UL (ref 149–390)
PMV BLD AUTO: 12.8 FL (ref 8.9–12.7)
POTASSIUM SERPL-SCNC: 3.9 MMOL/L (ref 3.5–5.3)
PROT SERPL-MCNC: 7.8 G/DL (ref 6.4–8.4)
PROT UR STRIP-MCNC: ABNORMAL MG/DL
PROT UR-MCNC: 21 MG/DL
PROT/CREAT UR: 0.09 MG/G{CREAT} (ref 0–0.1)
RBC # BLD AUTO: 4.7 MILLION/UL (ref 3.81–5.12)
RBC #/AREA URNS AUTO: ABNORMAL /HPF
SODIUM SERPL-SCNC: 137 MMOL/L (ref 135–147)
SP GR UR STRIP.AUTO: 1.03 (ref 1–1.03)
UROBILINOGEN UR STRIP-ACNC: <2 MG/DL
WBC # BLD AUTO: 7.34 THOUSAND/UL (ref 4.31–10.16)
WBC #/AREA URNS AUTO: ABNORMAL /HPF

## 2022-07-26 PROCEDURE — 86160 COMPLEMENT ANTIGEN: CPT

## 2022-07-26 PROCEDURE — 81001 URINALYSIS AUTO W/SCOPE: CPT | Performed by: INTERNAL MEDICINE

## 2022-07-26 PROCEDURE — 85025 COMPLETE CBC W/AUTO DIFF WBC: CPT

## 2022-07-26 PROCEDURE — 86140 C-REACTIVE PROTEIN: CPT

## 2022-07-26 PROCEDURE — 84156 ASSAY OF PROTEIN URINE: CPT | Performed by: INTERNAL MEDICINE

## 2022-07-26 PROCEDURE — 86225 DNA ANTIBODY NATIVE: CPT

## 2022-07-26 PROCEDURE — 80053 COMPREHEN METABOLIC PANEL: CPT

## 2022-07-26 PROCEDURE — 82570 ASSAY OF URINE CREATININE: CPT | Performed by: INTERNAL MEDICINE

## 2022-07-26 PROCEDURE — 85652 RBC SED RATE AUTOMATED: CPT

## 2022-07-26 PROCEDURE — 36415 COLL VENOUS BLD VENIPUNCTURE: CPT

## 2022-07-28 LAB — DSDNA AB SER-ACNC: <1 IU/ML (ref 0–9)

## 2022-07-31 ENCOUNTER — TELEPHONE (OUTPATIENT)
Dept: RHEUMATOLOGY | Facility: CLINIC | Age: 27
End: 2022-07-31

## 2022-07-31 DIAGNOSIS — M35.9 UNDIFFERENTIATED CONNECTIVE TISSUE DISEASE (HCC): ICD-10-CM

## 2022-08-05 ENCOUNTER — ANNUAL EXAM (OUTPATIENT)
Dept: OBGYN CLINIC | Facility: CLINIC | Age: 27
End: 2022-08-05
Payer: COMMERCIAL

## 2022-08-05 VITALS
SYSTOLIC BLOOD PRESSURE: 102 MMHG | WEIGHT: 154.2 LBS | DIASTOLIC BLOOD PRESSURE: 62 MMHG | BODY MASS INDEX: 27.32 KG/M2 | HEIGHT: 63 IN

## 2022-08-05 DIAGNOSIS — Z30.41 ENCOUNTER FOR SURVEILLANCE OF CONTRACEPTIVE PILLS: ICD-10-CM

## 2022-08-05 DIAGNOSIS — Z01.419 ENCOUNTER FOR ANNUAL ROUTINE GYNECOLOGICAL EXAMINATION: Primary | ICD-10-CM

## 2022-08-05 PROCEDURE — G0101 CA SCREEN;PELVIC/BREAST EXAM: HCPCS | Performed by: OBSTETRICS & GYNECOLOGY

## 2022-08-07 PROBLEM — N89.8 VAGINAL DISCHARGE: Status: RESOLVED | Noted: 2020-12-01 | Resolved: 2022-08-07

## 2022-08-07 RX ORDER — LEVONORGESTREL AND ETHINYL ESTRADIOL 0.15-0.03
1 KIT ORAL DAILY
Qty: 84 TABLET | Refills: 3 | Status: SHIPPED | OUTPATIENT
Start: 2022-08-07

## 2022-08-07 NOTE — PROGRESS NOTES
Assessment/Plan:     Diagnosis ICD-10-CM Associated Orders   1  Encounter for annual routine gynecological examination  Z01 419    2  Encounter for surveillance of contraceptive pills  Z30 41 levonorgestrel-ethinyl estradiol (Orpah Kiki) 0 15-30 MG-MCG per tablet           Jo-Ann Khan is a 24 y o  female who presents for annual exam  Periods are rare, lasting a few days since metformin started  Dysmenorrhea:none  Cyclic symptoms: none  No intermenstrual bleeding, spotting, or discharge  She reports decrease in dyspareunia  She is off Elavil  Current contraception: OCP (estrogen/progesterone)  History of abnormal Pap smear: no  Regular self breast exam: no  History of abnormal mammogram: no  History of abnormal lipids: yes  Menstrual History:  OB History        1    Para   0    Term   0       0    AB   1    Living           SAB   1    IAB   0    Ectopic   0    Multiple        Live Births               Obstetric Comments   Menarche age 10            No LMP recorded  (Menstrual status: Amenorrheic other)  The following portions of the patient's history were reviewed and updated as appropriate: allergies, current medications, past family history, past medical history, past social history, past surgical history and problem list     Review of Systems  Pertinent items are noted in HPI  Objective      /62 (BP Location: Right arm, Patient Position: Sitting, Cuff Size: Standard)   Ht 5' 2 5" (1 588 m)   Wt 69 9 kg (154 lb 3 2 oz)   BMI 27 75 kg/m²     General:   alert and oriented, in no acute distress, appears stated age and cooperative   Heart:    Breasts: regular rate and rhythm, S1, S2 normal, no murmur, click, rub or gallop   appear normal, no suspicious masses, no skin or nipple changes or axillary nodes     Lungs: effort normal   Abdomen: soft, non-tender, without masses or organomegaly   Vulva: normal   Vagina: normal mucosa   Cervix: no lesions   Uterus: normal size, mobile, non-tender   Adnexa: normal adnexa and no mass, fullness, tenderness

## 2022-08-24 ENCOUNTER — NURSE TRIAGE (OUTPATIENT)
Dept: OTHER | Facility: OTHER | Age: 27
End: 2022-08-24

## 2022-08-24 NOTE — TELEPHONE ENCOUNTER
LM for pt to call office  Per Nicci Osorio  F-- patient should consider going to urgent care instead of waiting until tomorrow

## 2022-08-24 NOTE — TELEPHONE ENCOUNTER
Pt called back  Cancelled her appt for tomorrow  She will go to ER or urgent care and keep us posted how she is doing   Then we will schedule follow up

## 2022-08-24 NOTE — TELEPHONE ENCOUNTER
Regarding: Feeling worse any medicine I could take  ----- Message from Sandro Baker sent at 8/24/2022 12:43 PM EDT -----  "Patient has an appointment tomorrow  with her doctor but she is feeling worse and wanted to know if there was anything she could do in the meantime before her appointment "

## 2022-08-24 NOTE — TELEPHONE ENCOUNTER
Patient is calling in stating that she is still having the same symptoms that she made her appt for but she is also ow having some chills and hot flashes  She states that her most recent temp was normal and she is not having a fever  She also states that her blood sugar has been normal for her in the 76s  Patient uses a continuous glucose monitor  Advised patient to do a finger stick to make sure that her continuous monitor is matching that  Also advised that if she has any worsening chest pain or SOB for what she made the appt for then she needs to go into an ED today  Patient verbalized understanding at this time  Reason for Disposition   Nursing judgment or information in reference    Answer Assessment - Initial Assessment Questions  1  REASON FOR CALL: "What is your main concern right now?"      Patient is having multiple symptoms- urinary frequency, nausea, chills, hot flashes, occasional chest pain, SOB occasionally     2  ONSET: "When did the symptoms start?"      About 2 weeks ago     3  SEVERITY: "How bad is the symptoms?"      Change in intensity     4  FEVER: "Do you have a fever?"      Highest temp was 99 9    5  OTHER SYMPTOMS: "Do you have any other new symptoms?"      None     6  INTERVENTIONS AND RESPONSE: "What have you done so far to try to make this better? What medications have you used?"      Taken tylenol with some relief     7   PREGNANCY: "Is there any chance you are pregnant?"      No    Protocols used: NO GUIDELINE AVAILABLE-ADULT-

## 2022-08-25 ENCOUNTER — TELEPHONE (OUTPATIENT)
Dept: OBGYN CLINIC | Facility: HOSPITAL | Age: 27
End: 2022-08-25

## 2022-08-25 DIAGNOSIS — R76.8 POSITIVE ANA (ANTINUCLEAR ANTIBODY): Primary | ICD-10-CM

## 2022-08-25 NOTE — TELEPHONE ENCOUNTER
There is no workup in her chart that indicates a lupus flare or that lupus is affecting her kidneys  It sounds like she may have a urinary tract infection and she should follow-up with her primary care physician  We do not need to change the appointment right now but I will add on lupus labs for her to have done and we will see what that shows  Thanks

## 2022-08-25 NOTE — TELEPHONE ENCOUNTER
DR Hurtado   RE appointment    [phone]    Patient seen ED yesterday  Notes in chart  Patient instructed to be seen by her Rheumatologist for Lupus Flare that is affecting her kidney's  No appointment available for several months  Patient is scheduled in October for follow up  Are you able to accommodate  [place]      Please advise

## 2022-08-25 NOTE — TELEPHONE ENCOUNTER
Spoke with patient and relayed information, she wanted to let you know she is doing well on the MTX

## 2022-08-31 ENCOUNTER — OFFICE VISIT (OUTPATIENT)
Dept: FAMILY MEDICINE CLINIC | Facility: CLINIC | Age: 27
End: 2022-08-31
Payer: COMMERCIAL

## 2022-08-31 VITALS
BODY MASS INDEX: 26.82 KG/M2 | HEIGHT: 63 IN | OXYGEN SATURATION: 99 % | WEIGHT: 151.4 LBS | HEART RATE: 78 BPM | SYSTOLIC BLOOD PRESSURE: 102 MMHG | RESPIRATION RATE: 18 BRPM | TEMPERATURE: 99.1 F | DIASTOLIC BLOOD PRESSURE: 60 MMHG

## 2022-08-31 DIAGNOSIS — R10.9 RIGHT FLANK PAIN: ICD-10-CM

## 2022-08-31 DIAGNOSIS — R07.89 OTHER CHEST PAIN: Primary | ICD-10-CM

## 2022-08-31 DIAGNOSIS — R10.11 RUQ ABDOMINAL PAIN: ICD-10-CM

## 2022-08-31 LAB
SL AMB  POCT GLUCOSE, UA: NORMAL
SL AMB LEUKOCYTE ESTERASE,UA: NORMAL
SL AMB POCT BILIRUBIN,UA: NORMAL
SL AMB POCT BLOOD,UA: NORMAL
SL AMB POCT CLARITY,UA: CLEAR
SL AMB POCT COLOR,UA: YELLOW
SL AMB POCT KETONES,UA: NORMAL
SL AMB POCT NITRITE,UA: NORMAL
SL AMB POCT PH,UA: 7
SL AMB POCT SPECIFIC GRAVITY,UA: 1.01
SL AMB POCT URINE PROTEIN: NORMAL
SL AMB POCT UROBILINOGEN: NORMAL

## 2022-08-31 PROCEDURE — 81002 URINALYSIS NONAUTO W/O SCOPE: CPT | Performed by: FAMILY MEDICINE

## 2022-08-31 PROCEDURE — 99214 OFFICE O/P EST MOD 30 MIN: CPT | Performed by: FAMILY MEDICINE

## 2022-08-31 RX ORDER — FAMOTIDINE 20 MG/1
20 TABLET, FILM COATED ORAL 2 TIMES DAILY
Qty: 28 TABLET | Refills: 0 | Status: ON HOLD | OUTPATIENT
Start: 2022-08-31 | End: 2022-09-27 | Stop reason: ALTCHOICE

## 2022-08-31 RX ORDER — CALCIUM CARBONATE 200(500)MG
1 TABLET,CHEWABLE ORAL DAILY
Qty: 30 TABLET | Refills: 0 | Status: SHIPPED | OUTPATIENT
Start: 2022-08-31

## 2022-08-31 RX ORDER — PROMETHAZINE HYDROCHLORIDE 12.5 MG/1
12.5 TABLET ORAL EVERY 6 HOURS PRN
COMMUNITY
End: 2022-09-23

## 2022-08-31 NOTE — PROGRESS NOTES
Assessment/Plan:    Patient presents with multiple complaints  Was recently evaluated at an out of state ER for chest pain, right flank pain, and urinary frequency  EKG was normal   D-dimer elevated but CTA showed no evidence of clot  UA, CPK, CBC, and CMP were unremarkable  History of pericarditis with similar chest symptoms  Chest pain is atypical and less likely cardiac  Patient also has a history of esophagitis with increased belching and epigastric pain  Will add Pepcid 20 mg daily with Gaviscon  Obtain abdominal ultrasound  Given the recent radiation exposure will avoid another CT  UA repeated in the office was negative for blood, glucose, or leuks  Less likely kidney stones or infection  Will follow up in 2 weeks or sooner  If unresolved, will refer back to cards for evaluation and GI  Will also discuss with PCP      Problem List Items Addressed This Visit        Other    Other chest pain - Primary    Relevant Medications    famotidine (PEPCID) 20 mg tablet    calcium carbonate (TUMS) 500 mg chewable tablet      Other Visit Diagnoses     RUQ abdominal pain        Relevant Orders    US abdomen complete    POCT urine dip (Completed)    Right flank pain        Relevant Orders    US abdomen complete    POCT urine dip (Completed)            Subjective:      Patient ID: Axel Islas is a 24 y o  female here for ER follow  Co history significant for type 1 diabetes, Hashimoto's, OCD, generalized anxiety, bipolar disorder, sober disease, and seizure disorder  Was a [employer] on 8/24 with right flank pain, frequent urination, and chest pain  Has had chest pain for several weeks  Previously treated for pericarditis with colchicine and steroids  Pain resolved with treatment  Pain has slowly increased  Localized to the center of her chest and associated with shortness of breath, increased belching, and epigastric pain  History of esophagitis and is currently on Protonix 40 mg daily   She was recently started on methotrexate with improved joint pain  Hospital records reviewed  Normal urinalysis, kidney function, blood sugar, and blood counts  Ddimer was elevated but CT showed no clots  EKG was also normal      The following portions of the patient's history were reviewed and updated as appropriate:   She  has a past medical history of Abdominal pain, Anxiety, Anxiety and depression, COVID-19 (12/2020), Depression, Eating disorder, Fracture of fibula, Gilbert disease, Hashimoto's disease (2/21/2020), Head injury, Nasal congestion, PTSD (post-traumatic stress disorder), Rectal bleed, Seizures ([facility] ), and Type 1 diabetes ([facility] )    She   Patient Active Problem List    Diagnosis Date Noted    Other chest pain 02/22/2022    Physical deconditioning 12/21/2021    Right-sided back pain 12/21/2021    Verruca 09/23/2021    Nail abnormality 09/01/2021    Primary hypothyroidism 08/18/2021    Diabetes mellitus type 1, uncomplicated, on long term insulin pump (HCC) 07/17/2021    Elevated prolactin level 07/14/2021    Medicare annual wellness visit, subsequent 06/29/2021    Fatty stools 06/29/2021    Left low back pain 06/29/2021    Hyperlipidemia 05/19/2021    Visual hallucinations 05/11/2021    DKA (diabetic ketoacidoses) 05/11/2021    Rheumatoid factor positive 04/29/2021    History of anesthesia complications 74/43/4174    Seizures ([facility])     Positive SOHAM (antinuclear antibody) 03/09/2021    Weakness generalized 02/11/2021    Low serum vitamin B12 02/11/2021    Arthralgia 02/11/2021    Bipolar affective disorder ([facility] ) 01/13/2021    Cough 12/31/2020    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    Yeast infection 12/01/2020    Vulvar cellulitis 11/04/2020    Vulvovaginitis 11/03/2020    Syncope     Chronic back pain 10/16/2020    Insulin pump status 09/30/2020    Muscle weakness 09/03/2020    Word finding difficulty 09/03/2020    Neuropathy 06/23/2020    Polyarthritis 06/23/2020    Secondary amenorrhea 06/23/2020  Vitamin D deficiency 06/19/2020    Paresthesia of left leg 04/21/2020    Hashimoto's disease 02/21/2020    Dysuria 02/21/2020    Amenorrhea 02/21/2020    Visual changes 01/21/2020    Chronic pain of right knee 10/15/2019    Chronic abdominal pain 10/15/2019    Blood in the stool 10/15/2019    OCD (obsessive compulsive disorder) 09/26/2019    Activity intolerance 09/10/2019    Chronic post-traumatic stress disorder (PTSD) 08/12/2019    Generalized anxiety disorder with panic attacks 08/12/2019    Agitation 08/06/2019    Concussion without loss of consciousness 07/02/2019    Urinary frequency 06/06/2019    Hypoglycemia 06/05/2019    Abnormal stools 02/07/2019    Uncontrolled type 1 diabetes mellitus with hypoglycemia without coma [facility] ) 01/29/2019    Abnormal liver function test 12/13/2018    Chronic RUQ pain 12/13/2018    Chronic fatigue and malaise 12/13/2018    Patellofemoral pain syndrome of right knee 09/26/2018     She  has a past surgical history that includes Wrist surgery; [place] tooth extraction; Nasal septoplasty w/ turbinoplasty; Knee surgery (Right, 07/06/2020); Sinus surgery; Turbinoplasty (N/A, 3/22/2021); Colonoscopy; Upper gastrointestinal endoscopy; and Skin biopsy  Her family history includes Alcohol abuse in her brother and father; Arthritis in her mother; Cancer in her family; Cholelithiasis in her father; Cirrhosis in her father; Coronary artery disease in her father; Depression in her mother; Diabetes in her family, father, and mother; Diabetes type II in her mother; Hashimoto's thyroiditis in her sister; Hyperlipidemia in her father and mother; Hypertension in her family, father, and mother; Migraines in her mother; Nephrolithiasis in her father; Sarcoidosis in her father; Thyroid disease in her sister; Varicose Veins in her mother  She  reports that she has never smoked   She has never used smokeless tobacco  She reports that she does not drink alcohol and does not use drugs  Current Outpatient Medications on File Prior to Visit   Medication Sig    clonazePAM (KlonoPIN) 0 5 mg tablet Take 1 tablet (0 5 mg total) by mouth 2 (two) times a day    folic acid (FOLVITE) 1 mg tablet Take 1 tablet (1 mg total) by mouth daily    gabapentin (Neurontin) 300 mg capsule Take 1 capsule (300 mg total) by mouth daily at bedtime    hydroxychloroquine (PLAQUENIL) 200 mg tablet TAKE 1 TABLET (200 MG TOTAL) BY MOUTH 2 (TWO) TIMES A DAY WITH MEALS    insulin aspart (NovoLOG) 100 units/mL injection Inject  Units under the skin    Insulin Pen Needle (BD PEN NEEDLE NASIM U/F) 32G X 4 MM MISC by Does not apply route 4 (four) times a day for 180 days    levonorgestrel-ethinyl estradiol (Altavera) 0 15-30 MG-MCG per tablet Take 1 tablet by mouth daily    levothyroxine 25 mcg tablet Take 1 tablet (25 mcg total) by mouth daily    metFORMIN (GLUCOPHAGE-XR) 500 mg 24 hr tablet 1,000 mg 2 (two) times a day with meals      methotrexate 2 5 mg tablet Take 6 tabs once weekly   pantoprazole (PROTONIX) 40 mg tablet Take 1 tablet (40 mg total) by mouth in the morning   promethazine (PHENERGAN) 12 5 MG tablet Take 12 5 mg by mouth every 6 (six) hours as needed     No current facility-administered medications on file prior to visit  She is allergic to insulin glargine       Review of Systems   Constitutional: Positive for appetite change  Negative for unexpected weight change  Respiratory: Negative for chest tightness, shortness of breath and wheezing  Cardiovascular: Positive for chest pain  Negative for palpitations  Gastrointestinal: Positive for abdominal pain and nausea  Negative for constipation, diarrhea and vomiting  Genitourinary: Positive for flank pain and frequency  Negative for difficulty urinating and hematuria  Musculoskeletal: Positive for arthralgias (improved )  Neurological: Positive for dizziness           Objective:      /60   Pulse 78   Temp 99 1 °F (37 3 °C)   Resp 18   Ht 5' 2 5" (1 588 m)   Wt 68 7 kg (151 lb 6 4 oz)   SpO2 99%   BMI 27 25 kg/m²          Physical Exam  Vitals reviewed  Constitutional:       General: She is not in acute distress  Appearance: Normal appearance  She is not ill-appearing  HENT:      Head: Normocephalic and atraumatic  Eyes:      Extraocular Movements: Extraocular movements intact  Cardiovascular:      Rate and Rhythm: Normal rate and regular rhythm  Heart sounds: No murmur heard  Pulmonary:      Effort: Pulmonary effort is normal  No respiratory distress  Breath sounds: Normal breath sounds  No stridor  No rales  Chest:      Chest wall: No tenderness  Abdominal:      General: Bowel sounds are normal       Palpations: Abdomen is soft  Tenderness: There is abdominal tenderness in the right upper quadrant and epigastric area  There is right CVA tenderness  Lymphadenopathy:      Cervical: No cervical adenopathy  Skin:     General: Skin is warm  Neurological:      Mental Status: She is alert and oriented to person, place, and time  Psychiatric:         Mood and Affect: Mood normal          Behavior: Behavior normal          Thought Content: Thought content normal            I have spent 35 minutes with patient and partner today in which greater than 50% of this time was spent in counseling/coordination of care regarding Diagnostic results, Intructions for management, Patient and family education and Impressions

## 2022-09-13 ENCOUNTER — TELEMEDICINE (OUTPATIENT)
Dept: BEHAVIORAL/MENTAL HEALTH CLINIC | Facility: CLINIC | Age: 27
End: 2022-09-13
Payer: COMMERCIAL

## 2022-09-13 DIAGNOSIS — F42.2 MIXED OBSESSIONAL THOUGHTS AND ACTS: Primary | ICD-10-CM

## 2022-09-13 DIAGNOSIS — F31.4 BIPOLAR DISORDER, CURRENT EPISODE DEPRESSED, SEVERE, WITHOUT PSYCHOTIC FEATURES (HCC): ICD-10-CM

## 2022-09-13 PROCEDURE — 90834 PSYTX W PT 45 MINUTES: CPT | Performed by: SOCIAL WORKER

## 2022-09-13 NOTE — PSYCH
Virtual Regular Visit    Verification of patient location:    Patient is located in the following state in which I hold an active license [place]      Assessment/Plan:    Problem List Items Addressed This Visit        Other    OCD (obsessive compulsive disorder) - Primary    Bipolar affective disorder ([facility] )          Goals addressed in session: Goal 1, Goal 2 and Goal 3           Reason for visit is No chief complaint on file  Encounter provider Adamaris Gonzales    Provider located at [address] [place] [id number] [phone]      Recent Visits  No visits were found meeting these conditions  Showing recent visits within past 7 days and meeting all other requirements  Today's Visits  Date Type Provider Dept   09/13/22 Telemedicine Adamaris Gonzales [facility] Therapist Jac Conway   Showing today's visits and meeting all other requirements  Future Appointments  No visits were found meeting these conditions  Showing future appointments within next 150 days and meeting all other requirements       The patient was identified by name and date of birth  Brendan Bojorquez was informed that this is a telemedicine visit and that the visit is being conducted throughic Embedded and patient was informed this is a secure, HIPAA-complaint platform  She agrees to proceed     My office door was closed  No one else was in the room  She acknowledged consent and understanding of privacy and security of the video platform  The patient has agreed to participate and understands they can discontinue the visit at any time  Patient is aware this is a billable service  Subjective  Brendan Bojorquez is a 24 y o  female    D: Met with BODØ individually  Session focused upon remaining in [facility] until end of October - then with parents    Issues with Jennifer Gunter continue - while packing him up Jennifer Gunter didn't make any arrangements for when [facility] visits  'I feels he just wanted to get away '  Just came back from [facility] visiting 'it didn't go well '  [facility] stayed to herself mostly  [facility] states she doesn't feel like she belongs with his family  (who are coming in Dec for 1 month to visit)  [facility] got a job - 8/16  Work from home - can work anywhere  Sold her car -   Health issues have increased - D Dimer was high but neg for PE  Still has fevers of UKO  Good news is that joint pain has been alleviated with Methotrexate  HPI     Past Medical History:   Diagnosis Date    Abdominal pain     Anxiety     Anxiety and depression     COVID-19 12/2020    Depression     Eating disorder     history of anorexia/bulemia [id number]    Fracture of fibula     R Salter I   Ingrid Rivers disease     Hashimoto's disease 2/21/2020    Head injury     Nasal congestion     PTSD (post-traumatic stress disorder)     Rectal bleed     Seizures (HCC)     Type 1 diabetes ([facility] )        Past Surgical History:   Procedure Laterality Date    COLONOSCOPY      KNEE SURGERY Right 07/06/2020    NASAL SEPTOPLASTY W/ TURBINOPLASTY      SINUS SURGERY      SKIN BIOPSY      TURBINOPLASTY N/A 3/22/2021    Procedure: TURBINOPLASTY;  Surgeon: See Andino MD;  Location: [facility];  Service: ENT    UPPER GASTROINTESTINAL ENDOSCOPY      WISDOM TOOTH EXTRACTION      WRIST SURGERY      left;  Excision of ganglion       Current Outpatient Medications   Medication Sig Dispense Refill    calcium carbonate (TUMS) 500 mg chewable tablet Chew 1 tablet (500 mg total) daily 30 tablet 0    clonazePAM (KlonoPIN) 0 5 mg tablet Take 1 tablet (0 5 mg total) by mouth 2 (two) times a day 45 tablet 2    famotidine (PEPCID) 20 mg tablet Take 1 tablet (20 mg total) by mouth 2 (two) times a day for 14 days 28 tablet 0    folic acid (FOLVITE) 1 mg tablet Take 1 tablet (1 mg total) by mouth daily 90 tablet 1    gabapentin (Neurontin) 300 mg capsule Take 1 capsule (300 mg total) by mouth daily at bedtime 60 capsule 0    hydroxychloroquine (PLAQUENIL) 200 mg tablet TAKE 1 TABLET (200 MG TOTAL) BY MOUTH 2 (TWO) TIMES A DAY WITH MEALS 60 tablet 5    insulin aspart (NovoLOG) 100 units/mL injection Inject  Units under the skin      Insulin Pen Needle (BD PEN NEEDLE NASIM U/F) 32G X 4 MM MISC by Does not apply route 4 (four) times a day for 180 days 400 each 3    levonorgestrel-ethinyl estradiol (Altavera) 0 15-30 MG-MCG per tablet Take 1 tablet by mouth daily 84 tablet 3    levothyroxine 25 mcg tablet Take 1 tablet (25 mcg total) by mouth daily 60 tablet 0    metFORMIN (GLUCOPHAGE-XR) 500 mg 24 hr tablet 1,000 mg 2 (two) times a day with meals        methotrexate 2 5 mg tablet Take 6 tabs once weekly  72 tablet 1    pantoprazole (PROTONIX) 40 mg tablet Take 1 tablet (40 mg total) by mouth in the morning  90 tablet 3    promethazine (PHENERGAN) 12 5 MG tablet Take 12 5 mg by mouth every 6 (six) hours as needed       No current facility-administered medications for this visit  Allergies   Allergen Reactions    Insulin Glargine Other (See Comments)     LANTUS BRAND -Burning and redness under skin        Review of Systems    Video Exam    There were no vitals filed for this visit      Physical Exam     I spent 50 minutes directly with the patient during this visit   [id number]

## 2022-09-15 ENCOUNTER — TELEPHONE (OUTPATIENT)
Dept: NEUROLOGY | Facility: CLINIC | Age: 27
End: 2022-09-15

## 2022-09-15 NOTE — TELEPHONE ENCOUNTER
----- Message from Li Hart MD sent at 9/13/2022  2:36 PM EDT -----  Please review abnormal low B12 level  with the patient- she is to start chewable B12 1000 mcg gummies asap

## 2022-09-19 ENCOUNTER — HOSPITAL ENCOUNTER (OUTPATIENT)
Dept: ULTRASOUND IMAGING | Facility: HOSPITAL | Age: 27
Discharge: HOME | End: 2022-09-19
Attending: FAMILY MEDICINE
Payer: COMMERCIAL

## 2022-09-19 DIAGNOSIS — R10.11 RUQ ABDOMINAL PAIN: ICD-10-CM

## 2022-09-19 DIAGNOSIS — R10.9 RIGHT FLANK PAIN: ICD-10-CM

## 2022-09-19 PROCEDURE — 76700 US EXAM ABDOM COMPLETE: CPT

## 2022-09-22 ENCOUNTER — OFFICE VISIT (OUTPATIENT)
Dept: FAMILY MEDICINE CLINIC | Facility: CLINIC | Age: 27
End: 2022-09-22
Payer: COMMERCIAL

## 2022-09-22 ENCOUNTER — APPOINTMENT (OUTPATIENT)
Dept: LAB | Facility: CLINIC | Age: 27
End: 2022-09-22
Payer: COMMERCIAL

## 2022-09-22 VITALS
SYSTOLIC BLOOD PRESSURE: 120 MMHG | BODY MASS INDEX: 25.94 KG/M2 | OXYGEN SATURATION: 99 % | DIASTOLIC BLOOD PRESSURE: 80 MMHG | HEIGHT: 63 IN | HEART RATE: 70 BPM | WEIGHT: 146.4 LBS | RESPIRATION RATE: 16 BRPM | TEMPERATURE: 97.4 F

## 2022-09-22 DIAGNOSIS — R63.0 POOR APPETITE: Primary | ICD-10-CM

## 2022-09-22 DIAGNOSIS — E06.3 HASHIMOTO'S DISEASE: ICD-10-CM

## 2022-09-22 DIAGNOSIS — R10.11 RIGHT UPPER QUADRANT ABDOMINAL PAIN: ICD-10-CM

## 2022-09-22 DIAGNOSIS — R63.0 POOR APPETITE: ICD-10-CM

## 2022-09-22 DIAGNOSIS — E53.8 LOW SERUM VITAMIN B12: ICD-10-CM

## 2022-09-22 DIAGNOSIS — R63.4 WEIGHT LOSS: ICD-10-CM

## 2022-09-22 DIAGNOSIS — R76.8 POSITIVE ANA (ANTINUCLEAR ANTIBODY): ICD-10-CM

## 2022-09-22 LAB
ALBUMIN SERPL BCP-MCNC: 4.3 G/DL (ref 3.5–5)
ALP SERPL-CCNC: 45 U/L (ref 34–104)
ALT SERPL W P-5'-P-CCNC: 8 U/L (ref 7–52)
ANION GAP SERPL CALCULATED.3IONS-SCNC: 9 MMOL/L (ref 4–13)
AST SERPL W P-5'-P-CCNC: 14 U/L (ref 13–39)
BACTERIA UR QL AUTO: ABNORMAL /HPF
BASOPHILS # BLD AUTO: 0.03 THOUSANDS/ΜL (ref 0–0.1)
BASOPHILS NFR BLD AUTO: 1 % (ref 0–1)
BILIRUB SERPL-MCNC: 1.66 MG/DL (ref 0.2–1)
BILIRUB UR QL STRIP: NEGATIVE
BUN SERPL-MCNC: 8 MG/DL (ref 5–25)
C3 SERPL-MCNC: 96.8 MG/DL (ref 90–180)
C4 SERPL-MCNC: 20 MG/DL (ref 10–40)
CALCIUM SERPL-MCNC: 9.2 MG/DL (ref 8.4–10.2)
CHLORIDE SERPL-SCNC: 104 MMOL/L (ref 96–108)
CLARITY UR: CLEAR
CO2 SERPL-SCNC: 24 MMOL/L (ref 21–32)
COLOR UR: COLORLESS
CREAT SERPL-MCNC: 0.85 MG/DL (ref 0.6–1.3)
CREAT UR-MCNC: 48.1 MG/DL
CRP SERPL QL: <1 MG/L
EOSINOPHIL # BLD AUTO: 0.04 THOUSAND/ΜL (ref 0–0.61)
EOSINOPHIL NFR BLD AUTO: 1 % (ref 0–6)
ERYTHROCYTE [DISTWIDTH] IN BLOOD BY AUTOMATED COUNT: 13.5 % (ref 11.6–15.1)
ERYTHROCYTE [SEDIMENTATION RATE] IN BLOOD: 6 MM/HOUR (ref 0–19)
GFR SERPL CREATININE-BSD FRML MDRD: 95 ML/MIN/1.73SQ M
GLUCOSE SERPL-MCNC: 113 MG/DL (ref 65–140)
GLUCOSE UR STRIP-MCNC: NEGATIVE MG/DL
HCT VFR BLD AUTO: 38.6 % (ref 34.8–46.1)
HGB BLD-MCNC: 12.9 G/DL (ref 11.5–15.4)
HGB UR QL STRIP.AUTO: NEGATIVE
IMM GRANULOCYTES # BLD AUTO: 0.02 THOUSAND/UL (ref 0–0.2)
IMM GRANULOCYTES NFR BLD AUTO: 0 % (ref 0–2)
KETONES UR STRIP-MCNC: ABNORMAL MG/DL
LEUKOCYTE ESTERASE UR QL STRIP: ABNORMAL
LIPASE SERPL-CCNC: 12 U/L (ref 11–82)
LYMPHOCYTES # BLD AUTO: 2.58 THOUSANDS/ΜL (ref 0.6–4.47)
LYMPHOCYTES NFR BLD AUTO: 47 % (ref 14–44)
MCH RBC QN AUTO: 29.5 PG (ref 26.8–34.3)
MCHC RBC AUTO-ENTMCNC: 33.4 G/DL (ref 31.4–37.4)
MCV RBC AUTO: 88 FL (ref 82–98)
MONOCYTES # BLD AUTO: 0.46 THOUSAND/ΜL (ref 0.17–1.22)
MONOCYTES NFR BLD AUTO: 8 % (ref 4–12)
NEUTROPHILS # BLD AUTO: 2.4 THOUSANDS/ΜL (ref 1.85–7.62)
NEUTS SEG NFR BLD AUTO: 43 % (ref 43–75)
NITRITE UR QL STRIP: NEGATIVE
NON-SQ EPI CELLS URNS QL MICRO: ABNORMAL /HPF
NRBC BLD AUTO-RTO: 0 /100 WBCS
PH UR STRIP.AUTO: 5 [PH]
PLATELET # BLD AUTO: 187 THOUSANDS/UL (ref 149–390)
PMV BLD AUTO: 13 FL (ref 8.9–12.7)
POTASSIUM SERPL-SCNC: 3.5 MMOL/L (ref 3.5–5.3)
PROT SERPL-MCNC: 7.2 G/DL (ref 6.4–8.4)
PROT UR STRIP-MCNC: NEGATIVE MG/DL
PROT UR-MCNC: <4 MG/DL
PROT/CREAT UR: <0.08 MG/G{CREAT} (ref 0–0.1)
RBC # BLD AUTO: 4.38 MILLION/UL (ref 3.81–5.12)
RBC #/AREA URNS AUTO: ABNORMAL /HPF
SODIUM SERPL-SCNC: 137 MMOL/L (ref 135–147)
SP GR UR STRIP.AUTO: 1 (ref 1–1.03)
UROBILINOGEN UR STRIP-ACNC: <2 MG/DL
VIT B12 SERPL-MCNC: 886 PG/ML (ref 100–900)
WBC # BLD AUTO: 5.53 THOUSAND/UL (ref 4.31–10.16)
WBC #/AREA URNS AUTO: ABNORMAL /HPF

## 2022-09-22 PROCEDURE — 83690 ASSAY OF LIPASE: CPT

## 2022-09-22 PROCEDURE — 86140 C-REACTIVE PROTEIN: CPT

## 2022-09-22 PROCEDURE — 80053 COMPREHEN METABOLIC PANEL: CPT

## 2022-09-22 PROCEDURE — 85652 RBC SED RATE AUTOMATED: CPT

## 2022-09-22 PROCEDURE — 84156 ASSAY OF PROTEIN URINE: CPT

## 2022-09-22 PROCEDURE — 85025 COMPLETE CBC W/AUTO DIFF WBC: CPT

## 2022-09-22 PROCEDURE — 82570 ASSAY OF URINE CREATININE: CPT

## 2022-09-22 PROCEDURE — 86225 DNA ANTIBODY NATIVE: CPT

## 2022-09-22 PROCEDURE — 82607 VITAMIN B-12: CPT

## 2022-09-22 PROCEDURE — 81001 URINALYSIS AUTO W/SCOPE: CPT

## 2022-09-22 PROCEDURE — 36415 COLL VENOUS BLD VENIPUNCTURE: CPT

## 2022-09-22 PROCEDURE — 86160 COMPLEMENT ANTIGEN: CPT

## 2022-09-22 PROCEDURE — 99214 OFFICE O/P EST MOD 30 MIN: CPT | Performed by: FAMILY MEDICINE

## 2022-09-22 NOTE — PROGRESS NOTES
Name: Lia Bell      : 1995      MRN: [id number]  Encounter Provider: Benjie Cope MD  Encounter Date: 2022   Encounter department: [facility]     Persistent RUQ pain  Positive collins sign  Abdominal U/S did not show cholecystectomy, cholelithiasis, or liver pathology  Pale appearing with increased fatigue  VSS  Etiology unclear  Check LFT, CBC, CRP, and lipase  Sees GI, recommend follow up  1  Poor appetite  -     Comprehensive metabolic panel; Future  -     CBC and differential; Future  -     C-reactive protein; Future  -     Lipase; Future    2  Weight loss  -     Comprehensive metabolic panel; Future  -     CBC and differential; Future  -     C-reactive protein; Future  -     Lipase; Future    3  Right upper quadrant abdominal pain  -     Comprehensive metabolic panel; Future  -     CBC and differential; Future  -     C-reactive protein; Future  -     Lipase; Future         Subjective      Here for 2 week follow  Appetite is poor but is eating more  Noticed random bruises on her body  More pale and still having RUQ pain  More tired  Loosing more weight  Was 151 lbs 2 weeks ago  Occasona spotting on the birth control  Abdominal pain is constant and doesn't radiate  Denies diarrhea and constipation  Unchanged with food  Bupring and chest pain improved after adding famotidine ( recently added)  Review of Systems   Constitutional: Positive for appetite change, fatigue and unexpected weight change  Negative for fever  Gastrointestinal: Positive for abdominal pain (RUQ) and nausea  Negative for vomiting  Genitourinary: Negative for difficulty urinating, dysuria and menstrual problem (on OCP )  Musculoskeletal: Negative for myalgias  Hematological: Bruises/bleeds easily         Current Outpatient Medications on File Prior to Visit   Medication Sig    calcium carbonate (TUMS) 500 mg chewable tablet Chew 1 tablet (500 mg total) daily    clonazePAM (KlonoPIN) 0 5 mg tablet Take 1 tablet (0 5 mg total) by mouth 2 (two) times a day    famotidine (PEPCID) 20 mg tablet Take 1 tablet (20 mg total) by mouth 2 (two) times a day for 14 days    folic acid (FOLVITE) 1 mg tablet Take 1 tablet (1 mg total) by mouth daily    gabapentin (Neurontin) 300 mg capsule Take 1 capsule (300 mg total) by mouth daily at bedtime    hydroxychloroquine (PLAQUENIL) 200 mg tablet TAKE 1 TABLET (200 MG TOTAL) BY MOUTH 2 (TWO) TIMES A DAY WITH MEALS    insulin aspart (NovoLOG) 100 units/mL injection Inject  Units under the skin    Insulin Pen Needle (BD PEN NEEDLE NASIM U/F) 32G X 4 MM MISC by Does not apply route 4 (four) times a day for 180 days    levonorgestrel-ethinyl estradiol (Altavera) 0 15-30 MG-MCG per tablet Take 1 tablet by mouth daily    levothyroxine 25 mcg tablet Take 1 tablet (25 mcg total) by mouth daily    metFORMIN (GLUCOPHAGE-XR) 500 mg 24 hr tablet 1,000 mg 2 (two) times a day with meals      methotrexate 2 5 mg tablet Take 6 tabs once weekly   pantoprazole (PROTONIX) 40 mg tablet Take 1 tablet (40 mg total) by mouth in the morning   [DISCONTINUED] promethazine (PHENERGAN) 12 5 MG tablet Take 12 5 mg by mouth every 6 (six) hours as needed (Patient not taking: Reported on 9/22/2022)       Objective     /80 (BP Location: Left arm, Patient Position: Sitting, Cuff Size: Standard)   Pulse 70   Temp (!) 97 4 °F (36 3 °C) (Temporal)   Resp 16   Ht 5' 2 5" (1 588 m)   Wt 66 4 kg (146 lb 6 4 oz)   SpO2 99%   BMI 26 35 kg/m²     Physical Exam  Vitals reviewed  Constitutional:       General: She is not in acute distress  Appearance: Normal appearance  She is ill-appearing  HENT:      Head: Normocephalic and atraumatic  Eyes:      General: No scleral icterus  Extraocular Movements: Extraocular movements intact  Cardiovascular:      Rate and Rhythm: Normal rate and regular rhythm        Heart sounds: No murmur heard   Abdominal:      General: There is no distension  Tenderness: There is abdominal tenderness (RUQ and mid epigastric region )  Lymphadenopathy:      Cervical: No cervical adenopathy  Skin:     General: Skin is warm  Coloration: Skin is pale  Findings: No bruising  Neurological:      Mental Status: She is alert and oriented to person, place, and time  Psychiatric:         Behavior: Behavior is withdrawn         Catalina Renteria MD

## 2022-09-24 LAB — DSDNA AB SER-ACNC: <1 IU/ML (ref 0–9)

## 2022-09-26 ENCOUNTER — APPOINTMENT (EMERGENCY)
Dept: CT IMAGING | Facility: HOSPITAL | Age: 27
DRG: 638 | End: 2022-09-26
Payer: COMMERCIAL

## 2022-09-26 ENCOUNTER — OFFICE VISIT (OUTPATIENT)
Dept: GASTROENTEROLOGY | Facility: AMBULARY SURGERY CENTER | Age: 27
End: 2022-09-26
Payer: COMMERCIAL

## 2022-09-26 ENCOUNTER — HOSPITAL ENCOUNTER (INPATIENT)
Facility: HOSPITAL | Age: 27
LOS: 3 days | Discharge: HOME | DRG: 638 | End: 2022-09-29
Attending: EMERGENCY MEDICINE | Admitting: INTERNAL MEDICINE
Payer: COMMERCIAL

## 2022-09-26 VITALS
OXYGEN SATURATION: 100 % | RESPIRATION RATE: 18 BRPM | WEIGHT: 144.4 LBS | HEIGHT: 62 IN | DIASTOLIC BLOOD PRESSURE: 74 MMHG | BODY MASS INDEX: 26.57 KG/M2 | SYSTOLIC BLOOD PRESSURE: 118 MMHG | HEART RATE: 74 BPM

## 2022-09-26 DIAGNOSIS — E10.9 TYPE 1 DIABETES MELLITUS (HCC): ICD-10-CM

## 2022-09-26 DIAGNOSIS — K59.00 CONSTIPATION, UNSPECIFIED CONSTIPATION TYPE: ICD-10-CM

## 2022-09-26 DIAGNOSIS — R10.9 RIGHT SIDED ABDOMINAL PAIN: ICD-10-CM

## 2022-09-26 DIAGNOSIS — R10.9 CHRONIC ABDOMINAL PAIN: ICD-10-CM

## 2022-09-26 DIAGNOSIS — E11.10 DKA (DIABETIC KETOACIDOSIS) (HCC): Primary | ICD-10-CM

## 2022-09-26 DIAGNOSIS — G89.29 CHRONIC ABDOMINAL PAIN: ICD-10-CM

## 2022-09-26 DIAGNOSIS — R11.2 NAUSEA AND VOMITING, UNSPECIFIED VOMITING TYPE: ICD-10-CM

## 2022-09-26 DIAGNOSIS — K21.9 GASTROESOPHAGEAL REFLUX DISEASE, UNSPECIFIED WHETHER ESOPHAGITIS PRESENT: Primary | ICD-10-CM

## 2022-09-26 DIAGNOSIS — E10.649 UNCONTROLLED TYPE 1 DIABETES MELLITUS WITH HYPOGLYCEMIA WITHOUT COMA (HCC): ICD-10-CM

## 2022-09-26 PROBLEM — IMO0002 DIABETES MELLITUS TYPE 1, UNCONTROLLED, WITH COMPLICATIONS: Status: ACTIVE | Noted: 2021-07-17

## 2022-09-26 PROBLEM — E10.10 DKA, TYPE 1 (HCC): Status: ACTIVE | Noted: 2021-05-11

## 2022-09-26 LAB
ALBUMIN SERPL BCP-MCNC: 4.8 G/DL (ref 3.5–5)
ALP SERPL-CCNC: 54 U/L (ref 34–104)
ALT SERPL W P-5'-P-CCNC: 11 U/L (ref 7–52)
ANION GAP SERPL CALCULATED.3IONS-SCNC: 10 MMOL/L (ref 4–13)
ANION GAP SERPL CALCULATED.3IONS-SCNC: 19 MMOL/L (ref 4–13)
ANION GAP SERPL CALCULATED.3IONS-SCNC: 8 MMOL/L (ref 4–13)
AST SERPL W P-5'-P-CCNC: 13 U/L (ref 13–39)
BACTERIA UR QL AUTO: ABNORMAL /HPF
BASE EX.OXY STD BLDV CALC-SCNC: 50.6 % (ref 60–80)
BASE EXCESS BLDV CALC-SCNC: -12 MMOL/L
BASOPHILS # BLD AUTO: 0.03 THOUSANDS/ΜL (ref 0–0.1)
BASOPHILS NFR BLD AUTO: 0 % (ref 0–1)
BETA-HYDROXYBUTYRATE: 5.1 MMOL/L
BILIRUB SERPL-MCNC: 2.09 MG/DL (ref 0.2–1)
BILIRUB UR QL STRIP: NEGATIVE
BUN SERPL-MCNC: 11 MG/DL (ref 5–25)
BUN SERPL-MCNC: 13 MG/DL (ref 5–25)
BUN SERPL-MCNC: 8 MG/DL (ref 5–25)
CALCIUM SERPL-MCNC: 8.5 MG/DL (ref 8.4–10.2)
CALCIUM SERPL-MCNC: 8.9 MG/DL (ref 8.4–10.2)
CALCIUM SERPL-MCNC: 9.6 MG/DL (ref 8.4–10.2)
CHLORIDE SERPL-SCNC: 101 MMOL/L (ref 96–108)
CHLORIDE SERPL-SCNC: 106 MMOL/L (ref 96–108)
CHLORIDE SERPL-SCNC: 107 MMOL/L (ref 96–108)
CLARITY UR: CLEAR
CO2 SERPL-SCNC: 14 MMOL/L (ref 21–32)
CO2 SERPL-SCNC: 18 MMOL/L (ref 21–32)
CO2 SERPL-SCNC: 20 MMOL/L (ref 21–32)
COLOR UR: ABNORMAL
CREAT SERPL-MCNC: 0.78 MG/DL (ref 0.6–1.3)
CREAT SERPL-MCNC: 0.81 MG/DL (ref 0.6–1.3)
CREAT SERPL-MCNC: 0.87 MG/DL (ref 0.6–1.3)
EOSINOPHIL # BLD AUTO: 0.03 THOUSAND/ΜL (ref 0–0.61)
EOSINOPHIL NFR BLD AUTO: 0 % (ref 0–6)
ERYTHROCYTE [DISTWIDTH] IN BLOOD BY AUTOMATED COUNT: 13.7 % (ref 11.6–15.1)
EXT PREG TEST URINE: NEGATIVE
EXT. CONTROL ED NAV: NORMAL
GFR SERPL CREATININE-BSD FRML MDRD: 101 ML/MIN/1.73SQ M
GFR SERPL CREATININE-BSD FRML MDRD: 105 ML/MIN/1.73SQ M
GFR SERPL CREATININE-BSD FRML MDRD: 92 ML/MIN/1.73SQ M
GLUCOSE SERPL-MCNC: 133 MG/DL (ref 65–140)
GLUCOSE SERPL-MCNC: 172 MG/DL (ref 65–140)
GLUCOSE SERPL-MCNC: 196 MG/DL (ref 65–140)
GLUCOSE SERPL-MCNC: 206 MG/DL (ref 65–140)
GLUCOSE SERPL-MCNC: 222 MG/DL (ref 65–140)
GLUCOSE SERPL-MCNC: 267 MG/DL (ref 65–140)
GLUCOSE SERPL-MCNC: 272 MG/DL (ref 65–140)
GLUCOSE SERPL-MCNC: 78 MG/DL (ref 65–140)
GLUCOSE SERPL-MCNC: 96 MG/DL (ref 65–140)
GLUCOSE UR STRIP-MCNC: ABNORMAL MG/DL
HCO3 BLDV-SCNC: 13.7 MMOL/L (ref 24–30)
HCT VFR BLD AUTO: 42.5 % (ref 34.8–46.1)
HGB BLD-MCNC: 13.9 G/DL (ref 11.5–15.4)
HGB UR QL STRIP.AUTO: ABNORMAL
IMM GRANULOCYTES # BLD AUTO: 0.04 THOUSAND/UL (ref 0–0.2)
IMM GRANULOCYTES NFR BLD AUTO: 1 % (ref 0–2)
KETONES UR STRIP-MCNC: ABNORMAL MG/DL
LACTATE SERPL-SCNC: 1.5 MMOL/L (ref 0.5–2)
LEUKOCYTE ESTERASE UR QL STRIP: ABNORMAL
LIPASE SERPL-CCNC: 9 U/L (ref 11–82)
LYMPHOCYTES # BLD AUTO: 2.03 THOUSANDS/ΜL (ref 0.6–4.47)
LYMPHOCYTES NFR BLD AUTO: 28 % (ref 14–44)
MAGNESIUM SERPL-MCNC: 1.8 MG/DL (ref 1.9–2.7)
MCH RBC QN AUTO: 29.8 PG (ref 26.8–34.3)
MCHC RBC AUTO-ENTMCNC: 32.7 G/DL (ref 31.4–37.4)
MCV RBC AUTO: 91 FL (ref 82–98)
MONOCYTES # BLD AUTO: 0.46 THOUSAND/ΜL (ref 0.17–1.22)
MONOCYTES NFR BLD AUTO: 6 % (ref 4–12)
NEUTROPHILS # BLD AUTO: 4.7 THOUSANDS/ΜL (ref 1.85–7.62)
NEUTS SEG NFR BLD AUTO: 65 % (ref 43–75)
NITRITE UR QL STRIP: NEGATIVE
NON-SQ EPI CELLS URNS QL MICRO: ABNORMAL /HPF
NRBC BLD AUTO-RTO: 0 /100 WBCS
O2 CT BLDV-SCNC: 10.8 ML/DL
PCO2 BLDV: 31.3 MM HG (ref 42–50)
PH BLDV: 7.26 [PH] (ref 7.3–7.4)
PH UR STRIP.AUTO: 5.5 [PH]
PHOSPHATE SERPL-MCNC: 2.1 MG/DL (ref 2.7–4.5)
PLATELET # BLD AUTO: 180 THOUSANDS/UL (ref 149–390)
PMV BLD AUTO: 13.1 FL (ref 8.9–12.7)
PO2 BLDV: 28 MM HG (ref 35–45)
POTASSIUM SERPL-SCNC: 3.7 MMOL/L (ref 3.5–5.3)
POTASSIUM SERPL-SCNC: 3.8 MMOL/L (ref 3.5–5.3)
POTASSIUM SERPL-SCNC: 4.2 MMOL/L (ref 3.5–5.3)
PROT SERPL-MCNC: 8.2 G/DL (ref 6.4–8.4)
PROT UR STRIP-MCNC: ABNORMAL MG/DL
RBC # BLD AUTO: 4.67 MILLION/UL (ref 3.81–5.12)
RBC #/AREA URNS AUTO: ABNORMAL /HPF
SODIUM SERPL-SCNC: 134 MMOL/L (ref 135–147)
SODIUM SERPL-SCNC: 134 MMOL/L (ref 135–147)
SODIUM SERPL-SCNC: 135 MMOL/L (ref 135–147)
SP GR UR STRIP.AUTO: 1.03 (ref 1–1.03)
UROBILINOGEN UR STRIP-ACNC: <2 MG/DL
WBC # BLD AUTO: 7.29 THOUSAND/UL (ref 4.31–10.16)
WBC #/AREA URNS AUTO: ABNORMAL /HPF

## 2022-09-26 PROCEDURE — 96365 THER/PROPH/DIAG IV INF INIT: CPT

## 2022-09-26 PROCEDURE — 74177 CT ABD & PELVIS W/CONTRAST: CPT

## 2022-09-26 PROCEDURE — 99223 1ST HOSP IP/OBS HIGH 75: CPT | Performed by: INTERNAL MEDICINE

## 2022-09-26 PROCEDURE — 82948 REAGENT STRIP/BLOOD GLUCOSE: CPT

## 2022-09-26 PROCEDURE — 80048 BASIC METABOLIC PNL TOTAL CA: CPT

## 2022-09-26 PROCEDURE — 81001 URINALYSIS AUTO W/SCOPE: CPT

## 2022-09-26 PROCEDURE — 81025 URINE PREGNANCY TEST: CPT

## 2022-09-26 PROCEDURE — 84100 ASSAY OF PHOSPHORUS: CPT

## 2022-09-26 PROCEDURE — 82805 BLOOD GASES W/O2 SATURATION: CPT

## 2022-09-26 PROCEDURE — C9113 INJ PANTOPRAZOLE SODIUM, VIA: HCPCS

## 2022-09-26 PROCEDURE — 96366 THER/PROPH/DIAG IV INF ADDON: CPT

## 2022-09-26 PROCEDURE — 80053 COMPREHEN METABOLIC PANEL: CPT

## 2022-09-26 PROCEDURE — 83605 ASSAY OF LACTIC ACID: CPT

## 2022-09-26 PROCEDURE — 83735 ASSAY OF MAGNESIUM: CPT

## 2022-09-26 PROCEDURE — 36415 COLL VENOUS BLD VENIPUNCTURE: CPT

## 2022-09-26 PROCEDURE — 99291 CRITICAL CARE FIRST HOUR: CPT | Performed by: EMERGENCY MEDICINE

## 2022-09-26 PROCEDURE — 82010 KETONE BODYS QUAN: CPT

## 2022-09-26 PROCEDURE — 99285 EMERGENCY DEPT VISIT HI MDM: CPT

## 2022-09-26 PROCEDURE — G1004 CDSM NDSC: HCPCS

## 2022-09-26 PROCEDURE — 96376 TX/PRO/DX INJ SAME DRUG ADON: CPT

## 2022-09-26 PROCEDURE — 99214 OFFICE O/P EST MOD 30 MIN: CPT | Performed by: INTERNAL MEDICINE

## 2022-09-26 PROCEDURE — 83690 ASSAY OF LIPASE: CPT

## 2022-09-26 PROCEDURE — 85025 COMPLETE CBC W/AUTO DIFF WBC: CPT

## 2022-09-26 PROCEDURE — 96375 TX/PRO/DX INJ NEW DRUG ADDON: CPT

## 2022-09-26 RX ORDER — PANTOPRAZOLE SODIUM 40 MG/1
40 TABLET, DELAYED RELEASE ORAL DAILY
Status: DISCONTINUED | OUTPATIENT
Start: 2022-09-27 | End: 2022-09-27

## 2022-09-26 RX ORDER — ONDANSETRON 2 MG/ML
4 INJECTION INTRAMUSCULAR; INTRAVENOUS ONCE
Status: COMPLETED | OUTPATIENT
Start: 2022-09-26 | End: 2022-09-26

## 2022-09-26 RX ORDER — FOLIC ACID 1 MG/1
1 TABLET ORAL DAILY
Status: DISCONTINUED | OUTPATIENT
Start: 2022-09-27 | End: 2022-09-27

## 2022-09-26 RX ORDER — SODIUM CHLORIDE 9 MG/ML
3 INJECTION INTRAVENOUS
Status: DISCONTINUED | OUTPATIENT
Start: 2022-09-26 | End: 2022-09-29 | Stop reason: HOSPADM

## 2022-09-26 RX ORDER — DEXTROSE, SODIUM CHLORIDE, SODIUM LACTATE, POTASSIUM CHLORIDE, AND CALCIUM CHLORIDE 5; .6; .31; .03; .02 G/100ML; G/100ML; G/100ML; G/100ML; G/100ML
250 INJECTION, SOLUTION INTRAVENOUS CONTINUOUS
Status: DISCONTINUED | OUTPATIENT
Start: 2022-09-26 | End: 2022-09-27

## 2022-09-26 RX ORDER — MORPHINE SULFATE 4 MG/ML
4 INJECTION, SOLUTION INTRAMUSCULAR; INTRAVENOUS ONCE
Status: COMPLETED | OUTPATIENT
Start: 2022-09-26 | End: 2022-09-26

## 2022-09-26 RX ORDER — LEVOTHYROXINE SODIUM 0.03 MG/1
25 TABLET ORAL
Status: DISCONTINUED | OUTPATIENT
Start: 2022-09-27 | End: 2022-09-27

## 2022-09-26 RX ORDER — SUCRALFATE ORAL 1 G/10ML
1 SUSPENSION ORAL 2 TIMES DAILY
Qty: 600 ML | Refills: 0 | Status: SHIPPED | OUTPATIENT
Start: 2022-09-26 | End: 2022-10-24

## 2022-09-26 RX ORDER — SODIUM CHLORIDE 9 MG/ML
100 INJECTION, SOLUTION INTRAVENOUS CONTINUOUS
Status: DISPENSED | OUTPATIENT
Start: 2022-09-27 | End: 2022-09-27

## 2022-09-26 RX ORDER — PROMETHAZINE HYDROCHLORIDE 12.5 MG/1
12.5 TABLET ORAL EVERY 6 HOURS PRN
Qty: 30 TABLET | Refills: 0 | Status: SHIPPED | OUTPATIENT
Start: 2022-09-26

## 2022-09-26 RX ORDER — HYDROMORPHONE HCL/PF 1 MG/ML
1 SYRINGE (ML) INJECTION
Status: DISCONTINUED | OUTPATIENT
Start: 2022-09-26 | End: 2022-09-29 | Stop reason: HOSPADM

## 2022-09-26 RX ORDER — ONDANSETRON 2 MG/ML
4 INJECTION INTRAMUSCULAR; INTRAVENOUS EVERY 6 HOURS PRN
Status: DISCONTINUED | OUTPATIENT
Start: 2022-09-26 | End: 2022-09-29 | Stop reason: HOSPADM

## 2022-09-26 RX ORDER — ENOXAPARIN SODIUM 100 MG/ML
40 INJECTION SUBCUTANEOUS DAILY
Status: CANCELLED | OUTPATIENT
Start: 2022-09-27

## 2022-09-26 RX ORDER — DEXTROSE, SODIUM CHLORIDE, SODIUM LACTATE, POTASSIUM CHLORIDE, AND CALCIUM CHLORIDE 5; .6; .31; .03; .02 G/100ML; G/100ML; G/100ML; G/100ML; G/100ML
250 INJECTION, SOLUTION INTRAVENOUS CONTINUOUS
Status: DISCONTINUED | OUTPATIENT
Start: 2022-09-26 | End: 2022-09-26

## 2022-09-26 RX ORDER — INSULIN LISPRO 100 [IU]/ML
5 INJECTION, SOLUTION INTRAVENOUS; SUBCUTANEOUS
Status: DISCONTINUED | OUTPATIENT
Start: 2022-09-26 | End: 2022-09-27

## 2022-09-26 RX ORDER — HYDROXYCHLOROQUINE SULFATE 200 MG/1
200 TABLET, FILM COATED ORAL 2 TIMES DAILY WITH MEALS
Status: DISCONTINUED | OUTPATIENT
Start: 2022-09-27 | End: 2022-09-29 | Stop reason: HOSPADM

## 2022-09-26 RX ORDER — PROMETHAZINE HYDROCHLORIDE 25 MG/1
12.5 TABLET ORAL EVERY 6 HOURS PRN
Status: DISCONTINUED | OUTPATIENT
Start: 2022-09-26 | End: 2022-09-26

## 2022-09-26 RX ORDER — ACETAMINOPHEN 325 MG/1
650 TABLET ORAL EVERY 4 HOURS PRN
Status: DISCONTINUED | OUTPATIENT
Start: 2022-09-26 | End: 2022-09-29 | Stop reason: HOSPADM

## 2022-09-26 RX ORDER — OXYCODONE HYDROCHLORIDE 10 MG/1
10 TABLET ORAL EVERY 4 HOURS PRN
Status: DISCONTINUED | OUTPATIENT
Start: 2022-09-26 | End: 2022-09-29 | Stop reason: HOSPADM

## 2022-09-26 RX ORDER — SUCRALFATE 1 G/1
1 TABLET ORAL ONCE
Status: COMPLETED | OUTPATIENT
Start: 2022-09-26 | End: 2022-09-26

## 2022-09-26 RX ORDER — GABAPENTIN 300 MG/1
300 CAPSULE ORAL AS NEEDED
Status: DISCONTINUED | OUTPATIENT
Start: 2022-09-26 | End: 2022-09-29 | Stop reason: HOSPADM

## 2022-09-26 RX ORDER — INSULIN LISPRO 100 [IU]/ML
1-5 INJECTION, SOLUTION INTRAVENOUS; SUBCUTANEOUS
Status: DISCONTINUED | OUTPATIENT
Start: 2022-09-27 | End: 2022-09-27

## 2022-09-26 RX ORDER — PANTOPRAZOLE SODIUM 40 MG/10ML
40 INJECTION, POWDER, LYOPHILIZED, FOR SOLUTION INTRAVENOUS ONCE
Status: COMPLETED | OUTPATIENT
Start: 2022-09-26 | End: 2022-09-26

## 2022-09-26 RX ORDER — OXYCODONE HYDROCHLORIDE 5 MG/1
5 TABLET ORAL EVERY 4 HOURS PRN
Status: DISCONTINUED | OUTPATIENT
Start: 2022-09-26 | End: 2022-09-29 | Stop reason: HOSPADM

## 2022-09-26 RX ORDER — PROMETHAZINE HYDROCHLORIDE 25 MG/1
12.5 TABLET ORAL EVERY 6 HOURS PRN
Status: DISCONTINUED | OUTPATIENT
Start: 2022-09-26 | End: 2022-09-29 | Stop reason: HOSPADM

## 2022-09-26 RX ORDER — INSULIN LISPRO 100 [IU]/ML
5 INJECTION, SOLUTION INTRAVENOUS; SUBCUTANEOUS
Status: DISCONTINUED | OUTPATIENT
Start: 2022-09-27 | End: 2022-09-26

## 2022-09-26 RX ORDER — CLONAZEPAM 0.5 MG/1
0.5 TABLET ORAL 2 TIMES DAILY PRN
Status: DISCONTINUED | OUTPATIENT
Start: 2022-09-26 | End: 2022-09-29 | Stop reason: HOSPADM

## 2022-09-26 RX ORDER — LEVONORGESTREL AND ETHINYL ESTRADIOL 0.15-0.03
1 KIT ORAL DAILY
Status: DISCONTINUED | OUTPATIENT
Start: 2022-09-27 | End: 2022-09-27

## 2022-09-26 RX ORDER — ONDANSETRON 2 MG/ML
2 INJECTION INTRAMUSCULAR; INTRAVENOUS ONCE
Status: COMPLETED | OUTPATIENT
Start: 2022-09-26 | End: 2022-09-26

## 2022-09-26 RX ORDER — SUCRALFATE 1 G/1
1 TABLET ORAL 2 TIMES DAILY
Status: DISCONTINUED | OUTPATIENT
Start: 2022-09-26 | End: 2022-09-29 | Stop reason: HOSPADM

## 2022-09-26 RX ADMIN — ONDANSETRON 2 MG: 2 INJECTION INTRAMUSCULAR; INTRAVENOUS at 19:02

## 2022-09-26 RX ADMIN — SUCRALFATE 1 G: 1 TABLET ORAL at 23:04

## 2022-09-26 RX ADMIN — MORPHINE SULFATE 4 MG: 4 INJECTION INTRAVENOUS at 17:16

## 2022-09-26 RX ADMIN — INSULIN DETEMIR 15 UNITS: 100 INJECTION, SOLUTION SUBCUTANEOUS at 23:07

## 2022-09-26 RX ADMIN — DEXTROSE, SODIUM CHLORIDE, SODIUM LACTATE, POTASSIUM CHLORIDE, AND CALCIUM CHLORIDE 250 ML/HR: 5; .6; .31; .03; .02 INJECTION, SOLUTION INTRAVENOUS at 20:17

## 2022-09-26 RX ADMIN — ONDANSETRON 4 MG: 2 INJECTION INTRAMUSCULAR; INTRAVENOUS at 17:16

## 2022-09-26 RX ADMIN — SODIUM CHLORIDE, SODIUM LACTATE, POTASSIUM CHLORIDE, AND CALCIUM CHLORIDE 2000 ML: .6; .31; .03; .02 INJECTION, SOLUTION INTRAVENOUS at 17:13

## 2022-09-26 RX ADMIN — IOHEXOL 100 ML: 350 INJECTION, SOLUTION INTRAVENOUS at 18:06

## 2022-09-26 RX ADMIN — SUCRALFATE 1 G: 1 TABLET ORAL at 19:15

## 2022-09-26 RX ADMIN — PANTOPRAZOLE SODIUM 40 MG: 40 INJECTION, POWDER, FOR SOLUTION INTRAVENOUS at 19:15

## 2022-09-26 RX ADMIN — INSULIN LISPRO 5 UNITS: 100 INJECTION, SOLUTION INTRAVENOUS; SUBCUTANEOUS at 23:06

## 2022-09-26 RX ADMIN — SODIUM CHLORIDE 6.6 UNITS/HR: 9 INJECTION, SOLUTION INTRAVENOUS at 18:19

## 2022-09-26 NOTE — ED NOTES
Patient transported to [address] [place], [place] [address]terri Banuelos RN  09/26/22 2108 complains of pain/discomfort/R arm

## 2022-09-26 NOTE — ED PROVIDER NOTES
History  Chief Complaint   Patient presents with    Abdominal Pain     Pt c/o R sided abd pain, nausea, and vomiting for weeks that has progressively gotten worse  States that abd pain increases with palpation  Pt recently evaluated for possible PE  States that the pain in her abd area has increased so much over the past week that she hasnt been able to sleep and hasnt been able to keep food down  States she has had a massive weight loss since august  Pt also diabetic  Was recently told her ketones are elevated and states she might be in DKA        Abdominal Pain      Prior to Admission Medications   Prescriptions Last Dose Informant Patient Reported? Taking?    Insulin Pen Needle (BD PEN NEEDLE NASIM U/F) 32G X 4 MM MISC  Self No No   Sig: by Does not apply route 4 (four) times a day for 180 days   calcium carbonate (TUMS) 500 mg chewable tablet  Self No No   Sig: Chew 1 tablet (500 mg total) daily   clonazePAM (KlonoPIN) 0 5 mg tablet  Self No No   Sig: Take 1 tablet (0 5 mg total) by mouth 2 (two) times a day   famotidine (PEPCID) 20 mg tablet   No No   Sig: Take 1 tablet (20 mg total) by mouth 2 (two) times a day for 14 days   Patient not taking: Reported on    folic acid (FOLVITE) 1 mg tablet  Self No No   Sig: Take 1 tablet (1 mg total) by mouth daily   gabapentin (Neurontin) 300 mg capsule  Self No No   Sig: Take 1 capsule (300 mg total) by mouth daily at bedtime   hydroxychloroquine (PLAQUENIL) 200 mg tablet  Self No No   Sig: TAKE 1 TABLET (200 MG TOTAL) BY MOUTH 2 (TWO) TIMES A DAY WITH MEALS   insulin aspart (NovoLOG) 100 units/mL injection  Self Yes No   Sig: Inject  Units under the skin   levonorgestrel-ethinyl estradiol (Altavera) 0 15-30 MG-MCG per tablet  Self No No   Sig: Take 1 tablet by mouth daily   levothyroxine 25 mcg tablet  Self No No   Sig: Take 1 tablet (25 mcg total) by mouth daily   metFORMIN (GLUCOPHAGE-XR) 500 mg 24 hr tablet  Self Yes No   Si,000 mg 2 (two) times a day with meals     methotrexate 2 5 mg tablet  Self No No   Sig: Take 6 tabs once weekly  pantoprazole (PROTONIX) 40 mg tablet  Self No No   Sig: Take 1 tablet (40 mg total) by mouth in the morning  promethazine (PHENERGAN) 12 5 MG tablet   No No   Sig: Take 1 tablet (12 5 mg total) by mouth every 6 (six) hours as needed for nausea or vomiting   sucralfate (CARAFATE) 1 g/10 mL suspension   No No   Sig: Take 10 mL (1 g total) by mouth 2 (two) times a day      Facility-Administered Medications: None       Past Medical History:   Diagnosis Date    Abdominal pain     Anxiety     Anxiety and depression     COVID-19 12/2020    Depression     Diabetes mellitus ([facility] [address] )     Disease of thyroid gland     Hashimoto    Eating disorder     history of anorexia/bulemia [id number]    Fracture of fibula     R Salter I   Komal Chum disease     Hashimoto's disease 02/21/2020    Head injury     Headache(784 0)     Nasal congestion     PTSD (post-traumatic stress disorder)     Rectal bleed     Seizures (HCC)     Type 1 diabetes ([facility] [address] )        Past Surgical History:   Procedure Laterality Date    COLONOSCOPY      KNEE SURGERY Right 07/06/2020    NASAL SEPTOPLASTY W/ TURBINOPLASTY      SINUS SURGERY      SKIN BIOPSY      TURBINOPLASTY N/A 3/22/2021    Procedure: Quinn Pena;  Surgeon: Michaela Parker MD;  Location: [facility];  Service: ENT    UPPER GASTROINTESTINAL ENDOSCOPY      WISDOM TOOTH EXTRACTION      WRIST SURGERY      left;  Excision of ganglion       Family History   Problem Relation Age of Onset    Hypertension Mother    Earna Carola Migraines Mother         Headache    Diabetes type II Mother     Varicose Veins Mother     Hyperlipidemia Mother     Diabetes Mother    Earna Carola Arthritis Mother     Depression Mother     Hearing loss Mother     Anxiety disorder Mother     Cholelithiasis Father     Hypertension Father     Sarcoidosis Father         Liver    Hyperlipidemia Father     Diabetes Father    Earna Carola Coronary artery disease Father     Nephrolithiasis Father     Cirrhosis Father     Alcohol abuse Father     Thyroid disease Sister     Hashimoto's thyroiditis Sister     Cancer Family     Diabetes Family     Hypertension Family     Alcohol abuse Brother      I have reviewed and agree with the history as documented  E-Cigarette/Vaping    E-Cigarette Use Never User      E-Cigarette/Vaping Substances    Nicotine No     THC No     CBD No     Flavoring No     Other No     Unknown No      Social History     Tobacco Use    Smoking status: Never Smoker    Smokeless tobacco: Never Used    Tobacco comment: Tobacco smoke exposure (Father smokes cigars)   Vaping Use    Vaping Use: Never used   Substance Use Topics    Alcohol use: Not Currently    Drug use: Never       Review of Systems   Gastrointestinal: Positive for abdominal pain         Physical Exam  Physical Exam    Vital Signs  ED Triage Vitals [09/26/22 1615]   Temperature Pulse Respirations Blood Pressure SpO2   98 1 °F (36 7 °C) 82 16 134/68 99 %      Temp src Heart Rate Source Patient Position - Orthostatic VS BP Location FiO2 (%)   -- -- -- -- --      Pain Score       5           Vitals:    09/26/22 1615 09/26/22 1616   BP: 134/68 112/68   Pulse: 82          Visual Acuity      ED Medications  Medications   ondansetron (ZOFRAN) injection 4 mg (has no administration in time range)   morphine injection 4 mg (has no administration in time range)   lactated ringers bolus 2,000 mL (has no administration in time range)       Diagnostic Studies  Results Reviewed     Procedure Component Value Units Date/Time    Lipase [[id number]]     Lab Status: No result Specimen: Blood     Comprehensive metabolic panel [[id number]]     Lab Status: No result Specimen: Blood     Magnesium [[id number]]     Lab Status: No result Specimen: Blood     Phosphorus [[id number]]     Lab Status: No result Specimen: Blood     Blood gas, venous [[id number]]     Lab Status: No result Specimen: Blood     Beta Hydroxybutyrate [[id number]]     Lab Status: No result Specimen: Blood     CBC and differential [[id number]]     Lab Status: No result Specimen: Blood     POCT pregnancy, urine [[id number]]     Lab Status: No result     UA w Reflex to Microscopic w Reflex to Culture [[id number]]     Lab Status: No result Specimen: Urine     Fingerstick Glucose (POCT) [[id number]]  (Abnormal) Collected: 09/26/22 1628    Lab Status: Final result Updated: 09/26/22 1635     POC Glucose 272 mg/dl                  CT abdomen pelvis with contrast    (Results Pending)              Procedures  Procedures         ED Course  ED Course as of 09/26/22 2237   Mon Sep 26, 2022   1637 POC Glucose(!): 272  elevated   1651 RUQ US 9/19 - ABDOMEN ULTRASOUND, COMPLETE      INDICATION:   R10 11: Right upper quadrant pain  R10 9: Unspecified abdominal pain  Abdominal pain      COMPARISON:  CT scans from 9/9/2004 and 10/22/2019     TECHNIQUE:   Real-time ultrasound of the abdomen was performed with a curvilinear transducer with both volumetric sweeps and still imaging techniques      FINDINGS:     PANCREAS:  Visualized portions of the pancreas are within normal limits      AORTA AND IVC:  Visualized portions are normal for patient age      LIVER:  Size:  Within normal range  The liver measures 12 9 cm in the midclavicular line  Contour:  Surface contour is smooth  Parenchyma:  Echogenicity and echotexture are within normal limits  No liver mass identified  Limited imaging of the main portal vein shows it to be patent and hepatopetal      BILIARY:  No gallbladder findings  No intrahepatic biliary dilatation  CBD measures 3 0 mm  No choledocholithiasis      KIDNEY:   Right kidney measures 9 7 x 5 0 x 4 9  cm  Volume 125 3 mL  Kidney within normal limits      Left kidney measures 10 3 x 4 9 x 4 6 cm  Volume 120 5 mL  Kidney within normal limits      SPLEEN:   Measures 8 2 x 8 4 x 3 8 cm   Volume 135 5 mL  Within normal limits      ASCITES:  None      IMPRESSION:     Normal    1727 Comprehensive metabolic panel(!)  Minimally low sodium  Low bicarb and AG noted, glucose elevated, potassium ok, concern for DKA will initiate insulin   1728 CBC and differential(!)  No significant leukocytosis reassuring diff, normal H/H, normal platelets  1728 Blood gas, venous(!)  Minimally acidotic   1729 Magnesium(!): 1 8  Minimally low mag   1729 Phosphorus(!): 2 1   1742 Beta Hydroxybutyrate(!)  elevated   1742 UA w Reflex to Microscopic w Reflex to Culture(!)  Moderate blood, small LE but negative nitrites, glucosuria and ketones noted   1804 Urine Microscopic(!)  Not c/w UTI   1825 POC Glucose(!): 172  Improved sugars   1901 CT abdomen pelvis with contrast  IMPRESSION:     Normal appendix  No evidence of bowel obstruction, colitis or diverticulitis           Workstation performed: [id number]      1905 POC Glucose: 133  wnl   1907 LACTIC ACID: 1 5  wnl   2004 POC Glucose: 78  wnl   3580 Basic metabolic panel(!)  AG closed  Per CC ok for floors  MDM    Disposition  Final diagnoses:   None     ED Disposition     None      Follow-up Information    None         Patient's Medications   Discharge Prescriptions    No medications on file       No discharge procedures on file      PDMP Review       Value Time User    PDMP Reviewed  Yes 7/9/2021  3:50 AM Enrique Pablo MD          ED Provider  Electronically Signed by           Haritha Macedo MD  09/26/22 5646

## 2022-09-26 NOTE — PROGRESS NOTES
[facility]  Progress Note - Jitendra Dubois 24 y o  female MRN: [id number]    Unit/Bed#:  Encounter: [id number]    Assessment/Plan:    1  Acute worsening of right-sided abdominal pain associated nausea and vomiting-possibly biliary pathology, recent blood work was notable for mild elevation of total bilirubin however lipase was normal   Fortunately no evidence of leukocytosis on recent CBC 4 days ago  She did have increased ketones and leukocytes on UA  Patient appears ill on exam with right-sided tenderness on exam   Vitals fortunately are stable   -given acute symptomatology, patient's inability to tolerate oral intake, would recommend elevation in the emergency room, may benefit from CT of abdomen and pelvis with contrast for further evaluation  -will repeat liver enzymes  -prescription for Phenergan sent to the pharmacy, recommended adding Carafate in addition to the pantoprazole 40 milligrams and famotidine     -spoke with the ER physician and relayed that the patient would be coming in  Subjective: This is a 17-year-old female with history of type 1 diabetes, on insulin,  thyroiditis, neuropathy, bipolar disorder, PTSD, anxiety disorder, hyperlipidemia, OCD, seizure disorder, presents for follow-up  Patient reports that she has had acute worsening of right-sided abdominal pain, nausea and vomiting over the past 1 week  Patient reports that she has been waking of every night and vomiting  Patient reports that she feels warm, reports subjective fevers  She reports that she has changed her diet and has started eating more vegetables but notices that her symptoms have gotten worse  She reports that this pain that she is experiencing now is worse her chronic right-sided abdominal pain  Reports that the heartburn has improved slightly with addition of pantoprazole and famotidine  No hematemesis, coffee-ground emesis or melena      She reports associated weight loss secondary to her acute symptoms  She has had extensive workup including HIDA scan which was negative, gastric emptying study which was normal   EGD and colonoscopy  EGD was notable for gastritis  Colonoscopy in 2019 was unremarkable  Gastric biopsies were notable for gastric intestinal metaplasia  Objective:     Vitals: Blood pressure 118/74, pulse 74, resp  rate 18, height 5' 2" (1 575 m), weight 65 5 kg (144 lb 6 4 oz), SpO2 100 %, not currently breastfeeding  ,Body mass index is 26 41 kg/m²  [unfilled]    Physical Exam:    GEN: wn/wd, NAD  HEENT: MMM, anciteric  CV: RRR, no m/r/g  CHEST: CTA b/l, no w/r/r  ABD: +BS, right-sided tenderness on palpation, distended  EXT: no c/c/e  SKIN: no rashes  NEURO: aaox3      Invasive Devices  Report    None                         Lab, Imaging and other studies:     No visits with results within 1 Day(s) from this visit     Latest known visit with results is:   Appointment on 09/22/2022   Component Date Value    Vitamin B-12 09/22/2022 886     C3 Complement 09/22/2022 96 8     C4, COMPLEMENT 09/22/2022 20 0     CRP 09/22/2022 <1 0     Sed Rate 09/22/2022 6     ds DNA Ab 09/22/2022 <1     Sodium 09/22/2022 137     Potassium 09/22/2022 3 5     Chloride 09/22/2022 104     CO2 09/22/2022 24     ANION GAP 09/22/2022 9     BUN 09/22/2022 8     Creatinine 09/22/2022 0 85     Glucose 09/22/2022 113     Calcium 09/22/2022 9 2     AST 09/22/2022 14     ALT 09/22/2022 8     Alkaline Phosphatase 09/22/2022 45     Total Protein 09/22/2022 7 2     Albumin 09/22/2022 4 3     Total Bilirubin 09/22/2022 1 66 (A)    eGFR 09/22/2022 95     WBC 09/22/2022 5 53     RBC 09/22/2022 4 38     Hemoglobin 09/22/2022 12 9     Hematocrit 09/22/2022 38 6     MCV 09/22/2022 88     MCH 09/22/2022 29 5     MCHC 09/22/2022 33 4     RDW 09/22/2022 13 5     MPV 09/22/2022 13 0 (A)    Platelets 79/04/1902 187     nRBC 09/22/2022 0     Neutrophils Relative 09/22/2022 43     Immat GRANS % 09/22/2022 0     Lymphocytes Relative 09/22/2022 47 (A)    Monocytes Relative 09/22/2022 8     Eosinophils Relative 09/22/2022 1     Basophils Relative 09/22/2022 1     Neutrophils Absolute 09/22/2022 2 40     Immature Grans Absolute 09/22/2022 0 02     Lymphocytes Absolute 09/22/2022 2 58     Monocytes Absolute 09/22/2022 0 46     Eosinophils Absolute 09/22/2022 0 04     Basophils Absolute 09/22/2022 0 03     Lipase 09/22/2022 12          I have personally reviewed pertinent films in PACS    No current facility-administered medications for this visit

## 2022-09-26 NOTE — ED PROVIDER NOTES
History  Chief Complaint   Patient presents with    Abdominal Pain     Pt c/o R sided abd pain, nausea, and vomiting for weeks that has progressively gotten worse  States that abd pain increases with palpation  Pt recently evaluated for possible PE  States that the pain in her abd area has increased so much over the past week that she hasnt been able to sleep and hasnt been able to keep food down  States she has had a massive weight loss since august  Pt also diabetic  Was recently told her ketones are elevated and states she might be in DKA      26yo F w/ hx significant for SLE-like illness, ovarian cyst, and DM1 presenting for abd  Pain  Approximately 1mo ago, Pt developed RLQ abdominal pain radiating to the right flank that she describes as constant, progressively worsening sharp pain not relieved by anything in particular  Pain worse with eating  Over the past few days, her pain has worsened, which is why she presented to the ED  Associated with N/V, urinary frequency, reduced urine production, floating stools, mucousy stools, dizziness, HA, SOB, CP, hot flashes  Is sexually active and uses barrier protection and OCP  Denies h/o STD, vaginal bleeding/discharge, dysuria, diaphoresis, chills, h/o DVT, leg swelling, leg pain, bloody stools/urine, diarrhea/constipation  LMP in 2018  History provided by:  Patient      Prior to Admission Medications   Prescriptions Last Dose Informant Patient Reported? Taking?    Insulin Pen Needle (BD PEN NEEDLE NASIM U/F) 32G X 4 MM MISC  Self No No   Sig: by Does not apply route 4 (four) times a day for 180 days   calcium carbonate (TUMS) 500 mg chewable tablet  Self No No   Sig: Chew 1 tablet (500 mg total) daily   clonazePAM (KlonoPIN) 0 5 mg tablet  Self No No   Sig: Take 1 tablet (0 5 mg total) by mouth 2 (two) times a day   famotidine (PEPCID) 20 mg tablet   No No   Sig: Take 1 tablet (20 mg total) by mouth 2 (two) times a day for 14 days   Patient not taking: Reported on 6627   folic acid (FOLVITE) 1 mg tablet  Self No No   Sig: Take 1 tablet (1 mg total) by mouth daily   gabapentin (Neurontin) 300 mg capsule  Self No No   Sig: Take 1 capsule (300 mg total) by mouth daily at bedtime   hydroxychloroquine (PLAQUENIL) 200 mg tablet  Self No No   Sig: TAKE 1 TABLET (200 MG TOTAL) BY MOUTH 2 (TWO) TIMES A DAY WITH MEALS   insulin aspart (NovoLOG) 100 units/mL injection  Self Yes No   Sig: Inject  Units under the skin   levonorgestrel-ethinyl estradiol (Altavera) 0 15-30 MG-MCG per tablet  Self No No   Sig: Take 1 tablet by mouth daily   levothyroxine 25 mcg tablet  Self No No   Sig: Take 1 tablet (25 mcg total) by mouth daily   metFORMIN (GLUCOPHAGE-XR) 500 mg 24 hr tablet  Self Yes No   Si,000 mg 2 (two) times a day with meals     methotrexate 2 5 mg tablet  Self No No   Sig: Take 6 tabs once weekly  pantoprazole (PROTONIX) 40 mg tablet  Self No No   Sig: Take 1 tablet (40 mg total) by mouth in the morning     promethazine (PHENERGAN) 12 5 MG tablet   No No   Sig: Take 1 tablet (12 5 mg total) by mouth every 6 (six) hours as needed for nausea or vomiting   sucralfate (CARAFATE) 1 g/10 mL suspension   No No   Sig: Take 10 mL (1 g total) by mouth 2 (two) times a day      Facility-Administered Medications: None       Past Medical History:   Diagnosis Date    Abdominal pain     Anxiety     Anxiety and depression     COVID-19 2020    Depression     Diabetes mellitus ([facility] [address] )     Disease of thyroid gland     Hashimoto    Eating disorder     history of anorexia/bulemia [id number]    Fracture of fibula     R [place] I   [place] disease     Hashimoto's disease 2020    Head injury     Headache(784 0)     Nasal congestion     PTSD (post-traumatic stress disorder)     Rectal bleed     Seizures (HCC)     Type 1 diabetes ([facility] [address] )        Past Surgical History:   Procedure Laterality Date    COLONOSCOPY      KNEE SURGERY Right 2020    NASAL SEPTOPLASTY W/ TURBINOPLASTY      SINUS SURGERY      SKIN BIOPSY      TURBINOPLASTY N/A 3/22/2021    Procedure: TURBINOPLASTY;  Surgeon: Silvano Galaviz MD;  [facility]: [facility];  Service: ENT    UPPER GASTROINTESTINAL ENDOSCOPY      WISDOM TOOTH EXTRACTION      WRIST SURGERY      left; Excision of ganglion       Family History   Problem Relation Age of Onset    Hypertension Mother    [facility] Migraines Mother         Headache    Diabetes type II Mother     Varicose Veins Mother     Hyperlipidemia Mother    [facility] Diabetes Mother    [facility] Arthritis Mother     Depression Mother     Hearing loss Mother     Anxiety disorder Mother     Cholelithiasis Father     Hypertension Father     Sarcoidosis Father         Liver    Hyperlipidemia Father     Diabetes Father     Coronary artery disease Father     Nephrolithiasis Father     Cirrhosis Father     Alcohol abuse Father     Thyroid disease Sister     Hashimoto's thyroiditis Sister     Cancer Family     Diabetes Family     Hypertension Family     Alcohol abuse Brother      I have reviewed and agree with the history as documented  E-Cigarette/Vaping    E-Cigarette Use Never User      E-Cigarette/Vaping Substances    Nicotine No     THC No     CBD No     Flavoring No     Other No     Unknown No      Social History     Tobacco Use    Smoking status: Never Smoker    Smokeless tobacco: Never Used    Tobacco comment: Tobacco smoke exposure (Father smokes cigars)   Vaping Use    Vaping Use: Never used   Substance Use Topics    Alcohol use: Not Currently    Drug use: Never        Review of Systems   Constitutional: Positive for activity change, appetite change and fatigue  Negative for chills, diaphoresis and fever  Eyes: Negative  Respiratory: Positive for shortness of breath  Negative for apnea, cough, choking, chest tightness and wheezing  Cardiovascular: Positive for chest pain  Negative for palpitations and leg swelling     Gastrointestinal: Positive for abdominal pain, nausea and vomiting  Negative for abdominal distention, blood in stool, constipation and diarrhea  Endocrine: Negative  Genitourinary: Positive for decreased urine volume, flank pain and frequency  Negative for dysuria, hematuria, pelvic pain, vaginal bleeding and vaginal discharge  Musculoskeletal: Negative for back pain, neck pain and neck stiffness  Skin: Positive for pallor  Negative for rash and wound  Neurological: Positive for weakness, light-headedness and headaches  Negative for syncope and numbness  Hematological: Negative  Psychiatric/Behavioral: Negative  Physical Exam  ED Triage Vitals   Temperature Pulse Respirations Blood Pressure SpO2   09/26/22 1615 09/26/22 1615 09/26/22 1615 09/26/22 1615 09/26/22 1615   98 1 °F (36 7 °C) 82 16 134/68 99 %      Temp src Heart Rate Source Patient Position - Orthostatic VS BP Location FiO2 (%)   -- 09/26/22 1800 09/26/22 1800 09/26/22 1800 --    Monitor Lying Right arm       Pain Score       09/26/22 1615       5             Orthostatic Vital Signs  Vitals:    09/26/22 1800 09/26/22 1900 09/26/22 2000 09/26/22 2100   BP: 115/66 96/55 93/58 99/57   Pulse: 81 72 77 79   Patient Position - Orthostatic VS: Lying Lying Lying Lying       Physical Exam  Constitutional:       General: She is not in acute distress  Appearance: She is well-developed  She is ill-appearing  She is not toxic-appearing or diaphoretic  HENT:      Head: Normocephalic and atraumatic  Right Ear: External ear normal       Left Ear: External ear normal       Nose: Nose normal  No rhinorrhea  Mouth/Throat:      Mouth: Mucous membranes are dry  Pharynx: Oropharynx is clear  Eyes:      General: No scleral icterus  Conjunctiva/sclera: Conjunctivae normal    Cardiovascular:      Rate and Rhythm: Normal rate and regular rhythm  Pulses: Normal pulses  Heart sounds: Normal heart sounds     Pulmonary:      Effort: Pulmonary effort is normal  No respiratory distress  Breath sounds: Normal breath sounds  Abdominal:      General: Abdomen is flat  Tenderness: There is abdominal tenderness (diffuse)  There is right CVA tenderness  There is no left CVA tenderness, guarding or rebound  Musculoskeletal:         General: No tenderness  Right lower leg: No edema  Left lower leg: No edema  Skin:     General: Skin is warm and dry  Capillary Refill: Capillary refill takes 2 to 3 seconds  Coloration: Skin is pale  Comments: Poor skin turgor    Neurological:      General: No focal deficit present  Mental Status: She is alert and oriented to person, place, and time  Sensory: No sensory deficit  Motor: Weakness present     Psychiatric:         Mood and Affect: Mood normal          Behavior: Behavior normal          ED Medications  Medications   sodium chloride (PF) 0 9 % injection 3 mL (has no administration in time range)   insulin regular (HumuLIN R,NovoLIN R) 1 Units/mL in sodium chloride 0 9 % 100 mL infusion (0 Units/hr Intravenous Stopped 9/26/22 2048)   dextrose 5 % in lactated Ringer's infusion (250 mL/hr Intravenous New Bag 9/26/22 2017)   ondansetron (ZOFRAN) injection 4 mg (4 mg Intravenous Given 9/26/22 1716)   morphine injection 4 mg (4 mg Intravenous Given 9/26/22 1716)   lactated ringers bolus 2,000 mL (0 mL Intravenous Stopped 9/26/22 1837)   iohexol (OMNIPAQUE) 350 MG/ML injection (SINGLE-DOSE) 100 mL (100 mL Intravenous Given 9/26/22 1806)   ondansetron (ZOFRAN) injection 2 mg (2 mg Intravenous Given 9/26/22 1902)   sucralfate (CARAFATE) tablet 1 g (1 g Oral Given 9/26/22 1915)   pantoprazole (PROTONIX) injection 40 mg (40 mg Intravenous Given 9/26/22 1915)       Diagnostic Studies  Results Reviewed     Procedure Component Value Units Date/Time    Basic metabolic panel [[id number]]  (Abnormal) Collected: 09/26/22 1939    Lab Status: Final result Specimen: Blood from Arm, Left Updated: 09/26/22 2013     Sodium 134 mmol/L      Potassium 3 8 mmol/L      Chloride 106 mmol/L      CO2 18 mmol/L      ANION GAP 10 mmol/L      BUN 11 mg/dL      Creatinine 0 78 mg/dL      Glucose 96 mg/dL      Calcium 8 9 mg/dL      eGFR 105 ml/min/1 73sq m     Narrative:      [place] guidelines for Chronic Kidney Disease (CKD):     Stage 1 with normal or high GFR (GFR > 90 mL/min/1 73 square meters)    Stage 2 Mild CKD (GFR = 60-89 mL/min/1 73 square meters)    Stage 3A Moderate CKD (GFR = 45-59 mL/min/1 73 square meters)    Stage 3B Moderate CKD (GFR = 30-44 mL/min/1 73 square meters)    Stage 4 Severe CKD (GFR = 15-29 mL/min/1 73 square meters)    Stage 5 End Stage CKD (GFR <15 mL/min/1 73 square meters)  Note: GFR calculation is accurate only with a steady state creatinine    Fingerstick Glucose (POCT) [[id number]]  (Normal) Collected: 09/26/22 1959    Lab Status: Final result Updated: 09/26/22 2000     POC Glucose 78 mg/dl     Lactic acid, plasma [[id number]]  (Normal) Collected: 09/26/22 1817    Lab Status: Final result Specimen: Blood from Arm, Left Updated: 09/26/22 1906     LACTIC ACID 1 5 mmol/L     Narrative:      Result may be elevated if tourniquet was used during collection      Fingerstick Glucose (POCT) [[id number]]  (Normal) Collected: 09/26/22 1902    Lab Status: Final result Updated: 09/26/22 1903     POC Glucose 133 mg/dl     Fingerstick Glucose (POCT) [[id number]]  (Abnormal) Collected: 09/26/22 1819    Lab Status: Final result Updated: 09/26/22 1821     POC Glucose 172 mg/dl     Urine Microscopic [[id number]]  (Abnormal) Collected: 09/26/22 1716    Lab Status: Final result Specimen: Urine, Clean Catch Updated: 09/26/22 1757     RBC, UA 2-4 /hpf      WBC, UA 1-2 /hpf      Epithelial Cells Occasional /hpf      Bacteria, UA Occasional /hpf     UA w Reflex to Microscopic w Reflex to Culture [[id number]]  (Abnormal) Collected: 09/26/22 1716    Lab Status: Final result Specimen: Urine, Clean Catch Updated: 09/26/22 1741     Color, UA Light Yellow     Clarity, UA Clear     Specific Gravity, UA 1 028     pH, UA 5 5     Leukocytes, UA Small     Nitrite, UA Negative     Protein, UA Trace mg/dl      Glucose, UA >=1000 (1%) mg/dl      Ketones, UA >=150 (4+) mg/dl      Urobilinogen, UA <2 0 mg/dl      Bilirubin, UA Negative     Occult Blood, UA Moderate    Beta Hydroxybutyrate [[id number]]  (Abnormal) Collected: 09/26/22 1700    Lab Status: Final result Specimen: Blood from Arm, Right Updated: 09/26/22 1740     BETA-HYDROXYBUTYRATE 5 1 mmol/L     Lipase [[id number]]  (Abnormal) Collected: 09/26/22 1700    Lab Status: Final result Specimen: Blood from Arm, Right Updated: 09/26/22 1726     Lipase 9 u/L     Comprehensive metabolic panel [[id number]]  (Abnormal) Collected: 09/26/22 1700    Lab Status: Final result Specimen: Blood from Arm, Right Updated: 09/26/22 1726     Sodium 134 mmol/L      Potassium 4 2 mmol/L      Chloride 101 mmol/L      CO2 14 mmol/L      ANION GAP 19 mmol/L      BUN 13 mg/dL      Creatinine 0 87 mg/dL      Glucose 267 mg/dL      Calcium 9 6 mg/dL      AST 13 U/L      ALT 11 U/L      Alkaline Phosphatase 54 U/L      Total Protein 8 2 g/dL      Albumin 4 8 g/dL      Total Bilirubin 2 09 mg/dL      eGFR 92 ml/min/1 73sq m     Narrative:      [place] guidelines for Chronic Kidney Disease (CKD):     Stage 1 with normal or high GFR (GFR > 90 mL/min/1 73 square meters)    Stage 2 Mild CKD (GFR = 60-89 mL/min/1 73 square meters)    Stage 3A Moderate CKD (GFR = 45-59 mL/min/1 73 square meters)    Stage 3B Moderate CKD (GFR = 30-44 mL/min/1 73 square meters)    Stage 4 Severe CKD (GFR = 15-29 mL/min/1 73 square meters)    Stage 5 End Stage CKD (GFR <15 mL/min/1 73 square meters)  Note: GFR calculation is accurate only with a steady state creatinine    Magnesium [[id number]]  (Abnormal) Collected: 09/26/22 1700    Lab Status: Final result Specimen: Blood from Arm, Right Updated: 09/26/22 1726     Magnesium 1 8 mg/dL     Phosphorus [[id number]]  (Abnormal) Collected: 09/26/22 1700    Lab Status: Final result Specimen: Blood from Arm, Right Updated: 09/26/22 1726     Phosphorus 2 1 mg/dL     POCT pregnancy, urine [[id number]]  (Normal) Resulted: 09/26/22 1713    Lab Status: Final result Updated: 09/26/22 1713     EXT PREG TEST UR (Ref: Negative) negative     Control valid    Blood gas, venous [[id number]]  (Abnormal) Collected: 09/26/22 1700    Lab Status: Final result Specimen: Blood from Arm, Right Updated: 09/26/22 1712     pH, Dariel 7 259     pCO2, Dariel 31 3 mm Hg      pO2, Dariel 28 0 mm Hg      HCO3, Dariel 13 7 mmol/L      Base Excess, Dariel -12 0 mmol/L      O2 Content, Dariel 10 8 ml/dL      O2 HGB, VENOUS 50 6 %     CBC and differential [[id number]]  (Abnormal) Collected: 09/26/22 1700    Lab Status: Final result Specimen: Blood from Arm, Right Updated: 09/26/22 1707     WBC 7 29 Thousand/uL      RBC 4 67 Million/uL      Hemoglobin 13 9 g/dL      Hematocrit 42 5 %      MCV 91 fL      MCH 29 8 pg      MCHC 32 7 g/dL      RDW 13 7 %      MPV 13 1 fL      Platelets 001 Thousands/uL      nRBC 0 /100 WBCs      Neutrophils Relative 65 %      Immat GRANS % 1 %      Lymphocytes Relative 28 %      Monocytes Relative 6 %      Eosinophils Relative 0 %      Basophils Relative 0 %      Neutrophils Absolute 4 70 Thousands/µL      Immature Grans Absolute 0 04 Thousand/uL      Lymphocytes Absolute 2 03 Thousands/µL      Monocytes Absolute 0 46 Thousand/µL      Eosinophils Absolute 0 03 Thousand/µL      Basophils Absolute 0 03 Thousands/µL     Fingerstick Glucose (POCT) [[id number]]  (Abnormal) Collected: 09/26/22 1628    Lab Status: Final result Updated: 09/26/22 1635     POC Glucose 272 mg/dl                  CT abdomen pelvis with contrast   Final Result by Carol Isaac MD (09/26 1856)      Normal appendix  No evidence of bowel obstruction, colitis or diverticulitis  Workstation performed: [id number]               Procedures  Procedures      ED Course                                       MDM  Number of Diagnoses or Management Options  Diagnosis management comments: Pt's presentation and labs suggestive of DKA  CT A/P negative for abdominal cause for presentation  Initiated DKA protocol, which Pt responded well too and remained stable  Per ICU, because AG closed, they advised Pt be admitted to the floor  Disposition  Final diagnoses:   DKA (diabetic ketoacidosis) ([address] )     Time reflects when diagnosis was documented in both MDM as applicable and the Disposition within this note     Time User Action Codes Description Comment    9/26/2022  8:51 PM Ramsey Helton Add [E11 10] DKA (diabetic ketoacidosis) [facility])       ED Disposition     ED Disposition   Admit    Condition   Stable    Date/Time   Mon Sep 26, 2022  8:50 PM    Comment   Case was discussed with Dr Kellie Whiteside and the patient's admission status was agreed to be Admission Status: inpatient status to the service of Dr Evelyn Oh   Follow-up Information    None         Patient's Medications   Discharge Prescriptions    No medications on file     No discharge procedures on file  PDMP Review       Value Time User    PDMP Reviewed  Yes 7/9/2021  3:50 AM Sekou Noguera MD           ED Provider  Attending physically available and evaluated [facility]  I managed the patient along with the ED Attending      Electronically Signed by         Wm Lorenzana MD  09/26/22 2120

## 2022-09-26 NOTE — LETTER
September 26, 2022     [place]  [address] [facility] [address]    Patient: Osmany Santillan   YOB: 1995   Date of Visit: 9/26/2022       Dear Dr Janelle Rios: Thank you for referring Osmany Santillan to me for evaluation  Below are my notes for this consultation  If you have questions, please do not hesitate to call me  I look forward to following your patient along with you  Sincerely,        Radha Correia MD        CC: No Recipients  Radha Correia MD  9/26/2022  3:55 PM  Sign when Signing Visit  [address] [facility]  Progress Note - Osmany Santillan 24 y o  female MRN: [id number]    Unit/Bed#:  Encounter: [id number]    Assessment/Plan:    1  Acute worsening of right-sided abdominal pain associated nausea and vomiting-possibly biliary pathology, recent blood work was notable for mild elevation of total bilirubin however lipase was normal   Fortunately no evidence of leukocytosis on recent CBC 4 days ago  She did have increased ketones and leukocytes on UA  Patient appears ill on exam with right-sided tenderness on exam   Vitals fortunately are stable   -given acute symptomatology, patient's inability to tolerate oral intake, would recommend elevation in the emergency room, may benefit from CT of abdomen and pelvis with contrast for further evaluation  -will repeat liver enzymes  -prescription for Phenergan sent to the pharmacy, recommended adding Carafate in addition to the pantoprazole 40 milligrams and famotidine     -spoke with the ER physician and relayed that the patient would be coming in  Subjective: This is a 44-year-old female with history of type 1 diabetes, on insulin,  thyroiditis, neuropathy, bipolar disorder, PTSD, anxiety disorder, hyperlipidemia, OCD, seizure disorder, presents for follow-up  Patient reports that she has had acute worsening of right-sided abdominal pain, nausea and vomiting over the past 1 week  Patient reports that she has been waking of every night and vomiting  Patient reports that she feels warm, reports subjective fevers  She reports that she has changed her diet and has started eating more vegetables but notices that her symptoms have gotten worse  She reports that this pain that she is experiencing now is worse her chronic right-sided abdominal pain  Reports that the heartburn has improved slightly with addition of pantoprazole and famotidine  No hematemesis, coffee-ground emesis or melena  She reports associated weight loss secondary to her acute symptoms  She has had extensive workup including HIDA scan which was negative, gastric emptying study which was normal   EGD and colonoscopy  EGD was notable for gastritis  Colonoscopy in 2019 was unremarkable  Gastric biopsies were notable for gastric intestinal metaplasia  Objective:     Vitals: Blood pressure 118/74, pulse 74, resp  rate 18, height 5' 2" (1 575 m), weight 65 5 kg (144 lb 6 4 oz), SpO2 100 %, not currently breastfeeding  ,Body mass index is 26 41 kg/m²  [unfilled]    Physical Exam:    GEN: wn/wd, NAD  HEENT: MMM, anciteric  CV: RRR, no m/r/g  CHEST: CTA b/l, no w/r/r  ABD: +BS, right-sided tenderness on palpation, distended  EXT: no c/c/e  SKIN: no rashes  NEURO: aaox3      Invasive Devices  Report    None                         Lab, Imaging and other studies:     No visits with results within 1 Day(s) from this visit     Latest known visit with results is:   Appointment on 09/22/2022   Component Date Value    Vitamin B-12 09/22/2022 886     C3 Complement 09/22/2022 96 8     C4, COMPLEMENT 09/22/2022 20 0     CRP 09/22/2022 <1 0     Sed Rate 09/22/2022 6     ds DNA Ab 09/22/2022 <1     Sodium 09/22/2022 137     Potassium 09/22/2022 3 5     Chloride 09/22/2022 104     CO2 09/22/2022 24     ANION GAP 09/22/2022 9     BUN 09/22/2022 8     Creatinine 09/22/2022 0 85     Glucose 09/22/2022 113     Calcium 09/22/2022 9 2     AST 09/22/2022 14     ALT 09/22/2022 8     Alkaline Phosphatase 09/22/2022 45     Total Protein 09/22/2022 7 2     Albumin 09/22/2022 4 3     Total Bilirubin 09/22/2022 1 66 (A)    eGFR 09/22/2022 95     WBC 09/22/2022 5 53     RBC 09/22/2022 4 38     Hemoglobin 09/22/2022 12 9     Hematocrit 09/22/2022 38 6     MCV 09/22/2022 88     MCH 09/22/2022 29 5     MCHC 09/22/2022 33 4     RDW 09/22/2022 13 5     MPV 09/22/2022 13 0 (A)    Platelets 18/48/2433 187     nRBC 09/22/2022 0     Neutrophils Relative 09/22/2022 43     Immat GRANS % 09/22/2022 0     Lymphocytes Relative 09/22/2022 47 (A)    Monocytes Relative 09/22/2022 8     Eosinophils Relative 09/22/2022 1     Basophils Relative 09/22/2022 1     Neutrophils Absolute 09/22/2022 2 40     Immature Grans Absolute 09/22/2022 0 02     Lymphocytes Absolute 09/22/2022 2 58     Monocytes Absolute 09/22/2022 0 46     Eosinophils Absolute 09/22/2022 0 04     Basophils Absolute 09/22/2022 0 03     Lipase 09/22/2022 12          I have personally reviewed pertinent films in PACS    No current facility-administered medications for this visit

## 2022-09-27 ENCOUNTER — TELEMEDICINE (OUTPATIENT)
Dept: BEHAVIORAL/MENTAL HEALTH CLINIC | Facility: CLINIC | Age: 27
End: 2022-09-27
Payer: COMMERCIAL

## 2022-09-27 DIAGNOSIS — F41.1 GENERALIZED ANXIETY DISORDER WITH PANIC ATTACKS: ICD-10-CM

## 2022-09-27 DIAGNOSIS — F43.12 CHRONIC POST-TRAUMATIC STRESS DISORDER (PTSD): ICD-10-CM

## 2022-09-27 DIAGNOSIS — F31.4 BIPOLAR DISORDER, CURRENT EPISODE DEPRESSED, SEVERE, WITHOUT PSYCHOTIC FEATURES (HCC): Primary | ICD-10-CM

## 2022-09-27 DIAGNOSIS — F41.0 GENERALIZED ANXIETY DISORDER WITH PANIC ATTACKS: ICD-10-CM

## 2022-09-27 LAB
ANION GAP SERPL CALCULATED.3IONS-SCNC: 9 MMOL/L (ref 4–13)
ANION GAP SERPL CALCULATED.3IONS-SCNC: 9 MMOL/L (ref 4–13)
BUN SERPL-MCNC: 6 MG/DL (ref 5–25)
BUN SERPL-MCNC: 7 MG/DL (ref 5–25)
CALCIUM SERPL-MCNC: 8.4 MG/DL (ref 8.4–10.2)
CALCIUM SERPL-MCNC: 8.4 MG/DL (ref 8.4–10.2)
CHLORIDE SERPL-SCNC: 108 MMOL/L (ref 96–108)
CHLORIDE SERPL-SCNC: 108 MMOL/L (ref 96–108)
CO2 SERPL-SCNC: 19 MMOL/L (ref 21–32)
CO2 SERPL-SCNC: 20 MMOL/L (ref 21–32)
CREAT SERPL-MCNC: 0.71 MG/DL (ref 0.6–1.3)
CREAT SERPL-MCNC: 0.73 MG/DL (ref 0.6–1.3)
GFR SERPL CREATININE-BSD FRML MDRD: 114 ML/MIN/1.73SQ M
GFR SERPL CREATININE-BSD FRML MDRD: 118 ML/MIN/1.73SQ M
GLUCOSE SERPL-MCNC: 155 MG/DL (ref 65–140)
GLUCOSE SERPL-MCNC: 170 MG/DL (ref 65–140)
GLUCOSE SERPL-MCNC: 216 MG/DL (ref 65–140)
GLUCOSE SERPL-MCNC: 297 MG/DL (ref 65–140)
GLUCOSE SERPL-MCNC: 43 MG/DL (ref 65–140)
GLUCOSE SERPL-MCNC: 70 MG/DL (ref 65–140)
GLUCOSE SERPL-MCNC: 77 MG/DL (ref 65–140)
GLUCOSE SERPL-MCNC: 83 MG/DL (ref 65–140)
GLUCOSE SERPL-MCNC: 86 MG/DL (ref 65–140)
GLUCOSE SERPL-MCNC: 94 MG/DL (ref 65–140)
POTASSIUM SERPL-SCNC: 3.6 MMOL/L (ref 3.5–5.3)
POTASSIUM SERPL-SCNC: 3.8 MMOL/L (ref 3.5–5.3)
SODIUM SERPL-SCNC: 136 MMOL/L (ref 135–147)
SODIUM SERPL-SCNC: 137 MMOL/L (ref 135–147)
TSH SERPL DL<=0.05 MIU/L-ACNC: 3.31 UIU/ML (ref 0.45–4.5)

## 2022-09-27 PROCEDURE — 99254 IP/OBS CNSLTJ NEW/EST MOD 60: CPT | Performed by: INTERNAL MEDICINE

## 2022-09-27 PROCEDURE — 90834 PSYTX W PT 45 MINUTES: CPT | Performed by: SOCIAL WORKER

## 2022-09-27 PROCEDURE — 82948 REAGENT STRIP/BLOOD GLUCOSE: CPT

## 2022-09-27 PROCEDURE — 84443 ASSAY THYROID STIM HORMONE: CPT | Performed by: INTERNAL MEDICINE

## 2022-09-27 PROCEDURE — 80048 BASIC METABOLIC PNL TOTAL CA: CPT

## 2022-09-27 RX ORDER — FOLIC ACID 1 MG/1
1 TABLET ORAL DAILY
Status: DISCONTINUED | OUTPATIENT
Start: 2022-09-27 | End: 2022-09-29 | Stop reason: HOSPADM

## 2022-09-27 RX ORDER — LEVOTHYROXINE SODIUM 0.03 MG/1
25 TABLET ORAL
Status: DISCONTINUED | OUTPATIENT
Start: 2022-09-27 | End: 2022-09-29 | Stop reason: HOSPADM

## 2022-09-27 RX ORDER — INSULIN LISPRO 100 [IU]/ML
5 INJECTION, SOLUTION INTRAVENOUS; SUBCUTANEOUS
Status: DISCONTINUED | OUTPATIENT
Start: 2022-09-27 | End: 2022-09-27

## 2022-09-27 RX ORDER — INSULIN LISPRO 100 [IU]/ML
1-5 INJECTION, SOLUTION INTRAVENOUS; SUBCUTANEOUS
Status: DISCONTINUED | OUTPATIENT
Start: 2022-09-27 | End: 2022-09-29 | Stop reason: HOSPADM

## 2022-09-27 RX ORDER — PANTOPRAZOLE SODIUM 40 MG/1
40 TABLET, DELAYED RELEASE ORAL DAILY
Status: DISCONTINUED | OUTPATIENT
Start: 2022-09-27 | End: 2022-09-29 | Stop reason: HOSPADM

## 2022-09-27 RX ORDER — LEVONORGESTREL AND ETHINYL ESTRADIOL 0.15-0.03
1 KIT ORAL DAILY
Status: DISCONTINUED | OUTPATIENT
Start: 2022-09-27 | End: 2022-09-29 | Stop reason: HOSPADM

## 2022-09-27 RX ORDER — DEXTROSE MONOHYDRATE 25 G/50ML
25 INJECTION, SOLUTION INTRAVENOUS ONCE
Status: COMPLETED | OUTPATIENT
Start: 2022-09-27 | End: 2022-09-27

## 2022-09-27 RX ADMIN — LEVONORGESTREL AND ETHINYL ESTRADIOL 1 TABLET: KIT at 22:45

## 2022-09-27 RX ADMIN — INSULIN DETEMIR 15 UNITS: 100 INJECTION, SOLUTION SUBCUTANEOUS at 21:23

## 2022-09-27 RX ADMIN — PROMETHAZINE HYDROCHLORIDE 12.5 MG: 25 TABLET ORAL at 21:35

## 2022-09-27 RX ADMIN — SUCRALFATE 1 G: 1 TABLET ORAL at 08:49

## 2022-09-27 RX ADMIN — INSULIN LISPRO 1 UNITS: 100 INJECTION, SOLUTION INTRAVENOUS; SUBCUTANEOUS at 11:16

## 2022-09-27 RX ADMIN — ACETAMINOPHEN 650 MG: 325 TABLET, FILM COATED ORAL at 09:59

## 2022-09-27 RX ADMIN — OXYCODONE HYDROCHLORIDE 5 MG: 5 TABLET ORAL at 01:03

## 2022-09-27 RX ADMIN — HYDROXYCHLOROQUINE SULFATE 200 MG: 200 TABLET, FILM COATED ORAL at 11:05

## 2022-09-27 RX ADMIN — SODIUM CHLORIDE 100 ML/HR: 0.9 INJECTION, SOLUTION INTRAVENOUS at 00:10

## 2022-09-27 RX ADMIN — CYANOCOBALAMIN TAB 500 MCG 1000 MCG: 500 TAB at 08:49

## 2022-09-27 RX ADMIN — METHOTREXATE SODIUM 15 MG: 2.5 TABLET ORAL at 21:25

## 2022-09-27 RX ADMIN — ONDANSETRON 4 MG: 2 INJECTION INTRAMUSCULAR; INTRAVENOUS at 08:03

## 2022-09-27 RX ADMIN — ONDANSETRON 4 MG: 2 INJECTION INTRAMUSCULAR; INTRAVENOUS at 17:12

## 2022-09-27 RX ADMIN — LEVOTHYROXINE SODIUM 25 MCG: 25 TABLET ORAL at 21:23

## 2022-09-27 RX ADMIN — PANTOPRAZOLE SODIUM 40 MG: 40 TABLET, DELAYED RELEASE ORAL at 06:32

## 2022-09-27 RX ADMIN — FOLIC ACID 1 MG: 1 TABLET ORAL at 22:45

## 2022-09-27 RX ADMIN — INSULIN LISPRO 1 UNITS: 100 INJECTION, SOLUTION INTRAVENOUS; SUBCUTANEOUS at 17:11

## 2022-09-27 RX ADMIN — DEXTROSE MONOHYDRATE 25 ML: 25 INJECTION, SOLUTION INTRAVENOUS at 05:36

## 2022-09-27 RX ADMIN — PROMETHAZINE HYDROCHLORIDE 12.5 MG: 25 TABLET ORAL at 08:49

## 2022-09-27 RX ADMIN — ONDANSETRON 4 MG: 2 INJECTION INTRAMUSCULAR; INTRAVENOUS at 01:11

## 2022-09-27 RX ADMIN — SUCRALFATE 1 G: 1 TABLET ORAL at 17:11

## 2022-09-27 RX ADMIN — HYDROXYCHLOROQUINE SULFATE 200 MG: 200 TABLET, FILM COATED ORAL at 17:11

## 2022-09-27 NOTE — PLAN OF CARE
Problem: PAIN - ADULT  Goal: Verbalizes/displays adequate comfort level or baseline comfort level  Description: Interventions:  - Encourage patient to monitor pain and request assistance  - Assess pain using appropriate pain scale  - Administer analgesics based on type and severity of pain and evaluate response  - Implement non-pharmacological measures as appropriate and evaluate response  - Consider cultural and social influences on pain and pain management  - Notify physician/advanced practitioner if interventions unsuccessful or patient reports new pain  Outcome: Progressing     Problem: INFECTION - ADULT  Goal: Absence or prevention of progression during hospitalization  Description: INTERVENTIONS:  - Assess and monitor for signs and symptoms of infection  - Monitor lab/diagnostic results  - Monitor all insertion sites, i e  indwelling lines, tubes, and drains  - Monitor endotracheal if appropriate and nasal secretions for changes in amount and color  - [place] appropriate cooling/warming therapies per order  - Administer medications as ordered  - Instruct and encourage patient and family to use good hand hygiene technique  - Identify and instruct in appropriate isolation precautions for identified infection/condition  Outcome: Progressing  Goal: Absence of fever/infection during neutropenic period  Description: INTERVENTIONS:  - Monitor WBC    Outcome: Progressing     Problem: SAFETY ADULT  Goal: Maintain or return to baseline ADL function  Description: INTERVENTIONS:  -  Assess patient's ability to carry out ADLs; assess patient's baseline for ADL function and identify physical deficits which impact ability to perform ADLs (bathing, care of mouth/teeth, toileting, grooming, dressing, etc )  - Assess/evaluate cause of self-care deficits   - Assess range of motion  - Assess patient's mobility; develop plan if impaired  - Assess patient's need for assistive devices and provide as appropriate  - Encourage maximum independence but intervene and supervise when necessary  - Involve family in performance of ADLs  - Assess for home care needs following discharge   - Consider OT consult to assist with ADL evaluation and planning for discharge  - Provide patient education as appropriate  Outcome: Progressing     Problem: DISCHARGE PLANNING  Goal: Discharge to home or other facility with appropriate resources  Description: INTERVENTIONS:  - Identify barriers to discharge w/patient and caregiver  - Arrange for needed discharge resources and transportation as appropriate  - Identify discharge learning needs (meds, wound care, etc )  - Arrange for interpretive services to assist at discharge as needed  - Refer to Case Management Department for coordinating discharge planning if the patient needs post-hospital services based on physician/advanced practitioner order or complex needs related to functional status, cognitive ability, or social support system  Outcome: Progressing     Problem: Knowledge Deficit  Goal: Patient/family/caregiver demonstrates understanding of disease process, treatment plan, medications, and discharge instructions  Description: Complete learning assessment and assess knowledge base  Interventions:  - Provide teaching at level of understanding  - Provide teaching via preferred learning methods  Outcome: Progressing     Problem: Nutrition/Hydration-ADULT  Goal: Nutrient/Hydration intake appropriate for improving, restoring or maintaining nutritional needs  Description: Monitor and assess patient's nutrition/hydration status for malnutrition  Collaborate with interdisciplinary team and initiate plan and interventions as ordered  Monitor patient's weight and dietary intake as ordered or per policy  Utilize nutrition screening tool and intervene as necessary  Determine patient's food preferences and provide high-protein, high-caloric foods as appropriate       INTERVENTIONS:  - Monitor oral intake, urinary output, labs, and treatment plans  - Assess nutrition and hydration status and recommend course of action  - Evaluate amount of meals eaten  - Assist patient with eating if necessary   - Allow adequate time for meals  - Recommend/ encourage appropriate diets, oral nutritional supplements, and vitamin/mineral supplements  - Order, calculate, and assess calorie counts as needed  - Recommend, monitor, and adjust tube feedings and TPN/PPN based on assessed needs  - Assess need for intravenous fluids  - Provide specific nutrition/hydration education as appropriate  - Include patient/family/caregiver in decisions related to nutrition  Outcome: Progressing

## 2022-09-27 NOTE — ASSESSMENT & PLAN NOTE
Assessment:   - TSH: 0 705 (on 9/26)    Plan:  - continue current dose of levothyroxine   - Will obtain free T4

## 2022-09-27 NOTE — ASSESSMENT & PLAN NOTE
Recent Labs     09/27/22  0631 09/27/22  0743 09/27/22  1031 09/27/22  1634   POCGLU 94 77 155* 216*     POA:  · Increased anion gap acidosis, positive beta hydroxybutyrate, urine glucose/ketones  · Initial sugar 272    Home regimen:  · Insulin pump  · Tresiba  · Metformin (also for PCOS)    Assessment:  · Patient reports good glucose control other than day of admission  · Most recent A1c 7 5  · Anion gap closed in ED after IV dextrose/insulin  · Insulin drip stopped  · Patient transition to basal-bolus    Plan:  Blood Sugar Average: Last 72 hrs:  (P) 139 5    DKA resolved  Endocrinology consulted  Recommendations:  -change to Lantus 15 units at bedtime  -continue sliding scale, Humalog with meals, and custom sliding scale at bedtime, starting at 300 to avoid hypoglycemia at night  -once patient's appetite improves and no nausea vomiting will start fixed dose of Humalog with meals,  -would recommend discharge patient home on basal bolus insulin regimen  -continue to monitor blood sugars with meals and at bedtime, will recommend further changes as necessary

## 2022-09-27 NOTE — ASSESSMENT & PLAN NOTE
Assessment & Plan  Patient states that she is being treated for SLE     Plan:  · Continue hydroxychloroquine  · Continue methotrexate

## 2022-09-27 NOTE — ASSESSMENT & PLAN NOTE
Recent Labs     09/26/22  1819 09/26/22  1902 09/26/22 1959 09/26/22  2210   POCGLU 172* 133 78 222*     POA:  · Increased anion gap acidosis, positive beta hydroxybutyrate, urine glucose/ketones  · Initial sugar 272    Home regimen:  · Insulin pump  · Tresiba  · Metformin (also for PCOS)    Assessment:  · Patient reports good glucose control other than day of admission  · Most recent A1c 7 5  · Anion gap closed in ED after IV dextrose/insulin  · Insulin drip stopped  · Patient transition to basal-bolus    Plan:  · Carb controlled diet  · 15 units Levemir q h s    · Patient states that parents will bring Cedric Lambert from home  · 5 units NovoLog t i d  with meals  · SSI algorithm 2  · Hold metformin  · Transition to normal saline

## 2022-09-27 NOTE — ED NOTES
Per admitting provider to administer D5 for 2 more hours (until midnight), then NS at that time  Also to administer 15u levemir [facility]        Boo Solis RN  09/26/22 1979

## 2022-09-27 NOTE — ASSESSMENT & PLAN NOTE
Patient states that she is being treated for SLE    Plan:  · Continue hydroxychloroquine  · Continue methotrexate

## 2022-09-27 NOTE — ASSESSMENT & PLAN NOTE
Assessment & Plan  Patient states that she is being treated for PCOS by OBGYN     Plan:  · Continue home OCP regimen  · Holding home metformin

## 2022-09-27 NOTE — UTILIZATION REVIEW
Initial Clinical Review    Admission: Date/Time/Statement:   Admission Orders (From admission, onward)     Ordered        09/26/22 2053  INPATIENT ADMISSION  Once                      Orders Placed This Encounter   Procedures    INPATIENT ADMISSION     Standing Status:   Standing     Number of Occurrences:   1     Order Specific Question:   Level of Care     Answer:   Med Surg [16]     Order Specific Question:   Estimated length of stay     Answer:   More than 2 Midnights     Order Specific Question:   Certification     Answer:   I certify that inpatient services are medically necessary for this patient for a duration of greater than two midnights  See H&P and MD Progress Notes for additional information about the patient's course of treatment  ED Arrival Information     Expected   -    Arrival   9/26/2022 16:00    Acuity   Emergent            Means of arrival   Walk-In    Escorted by   Self    Service   Hospitalist    Admission type   Emergency            Arrival complaint   ABD PAIN           Chief Complaint   Patient presents with    Abdominal Pain     Pt c/o R sided abd pain, nausea, and vomiting for weeks that has progressively gotten worse  States that abd pain increases with palpation  Pt recently evaluated for possible PE  States that the pain in her abd area has increased so much over the past week that she hasnt been able to sleep and hasnt been able to keep food down  States she has had a massive weight loss since august  Pt also diabetic  Was recently told her ketones are elevated and states she might be in DKA        Initial Presentation: 24 y o  female PMH of DM 1 on insulin pump, bipolar disorder, PTSD, anxiety disorder, OCD, seizure disorder, SLE-like illness, Hashimoto's thyroiditis, PCOS  who presents to Ed from GI officewith abdominal pain  Pt has had R sided pain/bowel issues x 3 yrs with extensive outotworkup  EGD noted gastritis   Issues resolved and then restarted August 2022 with pain worsened past week, no appetite, increasing N/V when eats  Pt also reports  urinary urgency but decreased urine production  Urine is foul smelling  She endorses floating/mucousy stools for one week  Occasional chest pain which is sharp, substernal,  improved with famotidine  Occasional hot flashes/flushing   On exam, abdominal pain RUQ, radiatong to R flank  Labs- increased anion gap, elevated beta hydroxybutyrate, positive urine ketones/glucose  Initial glucose 272   CT abdomen pelvis with contrast unremarkable  Pt given IV dextrose and insulin drip started  for DKA  Also received IV analgesic, IV antiemetic in ED  Anion gap closed  Pt admitted as Inpatient with DKA, type 1, R sided abdominal pain  Plan - Transition from insulin infusion to basal-bolus insulin , carb controlled diet, IVF- transition to NS from dextrose, hold home metformin  To get [place] from home   Pain contro, prn antiemetics   Date: 9/27/22   Day 2:   Pt with hypoglycemia , 43,overnight  Pt given 1/2 amp D50, Levemir decreased from 15 U to 10 U q hs, scheduled 5 U Humalog TID d/c'ed  Ellis sierra SSI , Accucheks   Endocrinology consult placed   On Carafate and PPI   Endocrinology consult-Patient stated that she switched herself to basal bolus insulin regimen 2 days prior to hospitalization, because of bruising from insulin pump infusion set  Pt has poor appetiteShe still has GI symptoms nausea ,abdominal pain, no vomiting today  Was transitioned to Levemir 15 units at bedtime yesterday, was given 5 units of Humalog at bedtime, which caused low blood sugar at night  Plan - change to Lantus 15 units at bedtime  Continue sliding scale, Humalog with meals, and custom sliding scale at bedtime, starting at 300 to avoid hypoglycemia at night  once patient's appetite improves and no nausea vomiting will start fixed dose of Humalog with meals,recommend discharge patient home on basal bolus insulin regimen  Continue to monitor blood sugars with meals and at bedtime  Pt to have insulin pump brought from home so settings can be checked   Pt has hx hypothyroid on levothyroxine  w/ no labs since 01/2022  TSH and free T4 ordered   ED Triage Vitals   Temperature Pulse Respirations Blood Pressure SpO2   09/26/22 1615 09/26/22 1615 09/26/22 1615 09/26/22 1615 09/26/22 1615   98 1 °F (36 7 °C) 82 16 134/68 99 %      Temp src Heart Rate Source Patient Position - Orthostatic VS BP Location FiO2 (%)   -- 09/26/22 1800 09/26/22 1800 09/26/22 1800 --    Monitor Lying Right arm       Pain Score       09/26/22 1615       5          Wt Readings from Last 1 Encounters:   09/27/22 65 kg (143 lb 4 8 oz)     Additional Vital Signs:   Date/Time Temp Pulse Resp BP MAP (mmHg) SpO2   09/27/22 08:08:35 98 1 °F (36 7 °C) 83 -- 98/61 73 96 %   09/27/22 07:45:31 98 1 °F (36 7 °C) 73 17 93/60 71 97 %   09/27/22 00:43:11 97 8 °F (36 6 °C) 75 17 112/75 87 100 %   09/27/22 0015 -- 73 16 99/56 72 98 %   09/26/22 2200 -- 80 16 90/51 66 97 %   09/26/22 2100 -- 79 16 99/57 72 99 %   09/26/22 2000 -- 77 16 93/58 71 97 %   09/26/22 1900 -- 72 16 96/55 70 97 %   09/26/22 1800 -- 81 16 115/66 82 98 %   09/26/22 1616 -- -- -- 112/68 -- --       Pertinent Labs/Diagnostic Test Results:   CT abdomen pelvis with contrast   Final Result by Janessa London MD (09/26 1856)      Normal appendix  No evidence of bowel obstruction, colitis or diverticulitis              Workstation performed: [id number]               Results from last 7 days   Lab Units 09/26/22  1700 09/22/22  1726   WBC Thousand/uL 7 29 5 53   HEMOGLOBIN g/dL 13 9 12 9   HEMATOCRIT % 42 5 38 6   PLATELETS Thousands/uL 180 187   NEUTROS ABS Thousands/µL 4 70 2 40         Results from last 7 days   Lab Units 09/27/22  0455 09/26/22  2310 09/26/22  1939 09/26/22  1700 09/22/22  1726   SODIUM mmol/L 137 135 134* 134* 137   POTASSIUM mmol/L 3 6 3 7 3 8 4 2 3 5   CHLORIDE mmol/L 108 107 106 101 104   CO2 mmol/L 20* 20* 18* 14* 24   ANION GAP mmol/L 9 8 10 19* 9   BUN mg/dL 7 8 11 13 8   CREATININE mg/dL 0 73 0 81 0 78 0 87 0 85   EGFR ml/min/1 73sq m 114 101 105 92 95   CALCIUM mg/dL 8 4 8 5 8 9 9 6 9 2   MAGNESIUM mg/dL  --   --   --  1 8*  --    PHOSPHORUS mg/dL  --   --   --  2 1*  --      Results from last 7 days   Lab Units 09/26/22  1700 09/22/22  1726   AST U/L 13 14   ALT U/L 11 8   ALK PHOS U/L 54 45   TOTAL PROTEIN g/dL 8 2 7 2   ALBUMIN g/dL 4 8 4 3   TOTAL BILIRUBIN mg/dL 2 09* 1 66*     Results from last 7 days   Lab Units 09/27/22  1031 09/27/22  0743 09/27/22  0631 09/27/22  0443 09/27/22  0253 09/27/22  0225 09/26/22  2326 09/26/22  2210 09/26/22  1959 09/26/22  1902 09/26/22  1819 09/26/22  1628   POC GLUCOSE mg/dl 155* 77 94 86 70 43* 196* 222* 78 133 172* 272*     Results from last 7 days   Lab Units 09/27/22  0455 09/26/22  2310 09/26/22  1939 09/26/22  1700 09/22/22  1726   GLUCOSE RANDOM mg/dL 83 206* 96 267* 113             BETA-HYDROXYBUTYRATE   Date Value Ref Range Status   09/26/2022 5 1 (H) <0 6 mmol/L Final          Results from last 7 days   Lab Units 09/26/22  1700   PH ISADORA  7 259*   PCO2 ISADORA mm Hg 31 3*   PO2 ISADORA mm Hg 28 0*   HCO3 ISADORA mmol/L 13 7*   BASE EXC ISADORA mmol/L -12 0   O2 CONTENT ISADORA ml/dL 10 8   O2 HGB, VENOUS % 50 6*           Results from last 7 days   Lab Units 09/26/22  1817   LACTIC ACID mmol/L 1 5                         Results from last 7 days   Lab Units 09/26/22  1700 09/22/22  1726   LIPASE u/L 9* 12     Results from last 7 days   Lab Units 09/22/22  1726   CRP mg/L <1 0   SED RATE mm/hour 6             Results from last 7 days   Lab Units 09/26/22  1716 09/22/22  1726   CLARITY UA  Clear Clear   COLOR UA  Light Yellow Colorless   SPEC GRAV UA  1 028 1 005   PH UA  5 5 5 0   GLUCOSE UA mg/dl >=1000 (1%)* Negative   KETONES UA mg/dl >=150 (4+)* 20 (1+)*   BLOOD UA  Moderate* Negative   PROTEIN UA mg/dl Trace* Negative   NITRITE UA  Negative Negative   BILIRUBIN UA  Negative Negative   UROBILINOGEN UA (BE) mg/dl <2 0 <2 0   LEUKOCYTES UA  Small* Trace*   WBC UA /hpf 1-2 2-4*   RBC UA /hpf 2-4* 1-2   BACTERIA UA /hpf Occasional Innumerable*   EPITHELIAL CELLS WET PREP /hpf Occasional Occasional   CREATININE UR mg/dL  --  48 1   PROTEIN UR mg/dL  --  <4   PROT/CREAT RATIO UR   --  <0 08                     ED Treatment:   Medication Administration from 09/26/2022 1600 to 09/27/2022 0038       Date/Time Order Dose Route Action Comments     09/26/2022 1716 ondansetron (ZOFRAN) injection 4 mg 4 mg Intravenous Given      09/26/2022 1716 morphine injection 4 mg 4 mg Intravenous Given      09/26/2022 1713 lactated ringers bolus 2,000 mL 2,000 mL Intravenous New Bag      09/26/2022 2048 insulin regular (HumuLIN R,NovoLIN R) 1 Units/mL in sodium chloride 0 9 % 100 mL infusion 0 Units/hr Intravenous Stopped Per provider order due to anion gap being closed  09/26/2022 2017 insulin regular (HumuLIN R,NovoLIN R) 1 Units/mL in sodium chloride 0 9 % 100 mL infusion 0 Units/hr Intravenous Hold Per provider due to blood glucose  09/26/2022 2001 insulin regular (HumuLIN R,NovoLIN R) 1 Units/mL in sodium chloride 0 9 % 100 mL infusion 1 6 Units/hr Intravenous Rate/Dose Change due to glucose level     09/26/2022 1845 insulin regular (HumuLIN R,NovoLIN R) 1 Units/mL in sodium chloride 0 9 % 100 mL infusion 3 3 Units/hr Intravenous Rate/Dose Change Per provider order- titrate dose to 3  3u/hr     09/26/2022 1819 insulin regular (HumuLIN R,NovoLIN R) 1 Units/mL in sodium chloride 0 9 % 100 mL infusion 6 6 Units/hr Intravenous New Bag      09/26/2022 1806 iohexol (OMNIPAQUE) 350 MG/ML injection (SINGLE-DOSE) 100 mL 100 mL Intravenous Given      09/26/2022 1902 ondansetron (ZOFRAN) injection 2 mg 2 mg Intravenous Given      09/26/2022 1915 sucralfate (CARAFATE) tablet 1 g 1 g Oral Given      09/26/2022 1915 pantoprazole (PROTONIX) injection 40 mg 40 mg Intravenous Given      09/26/2022 2220 dextrose 5 % in lactated Ringer's infusion 0 mL/hr Intravenous Stopped      09/26/2022 2017 dextrose 5 % in lactated Ringer's infusion 250 mL/hr Intravenous New Bag      09/26/2022 2304 sucralfate (CARAFATE) tablet 1 g 1 g Oral Given      09/26/2022 2307 insulin detemir (LEVEMIR) subcutaneous injection 15 Units 15 Units Subcutaneous Given      09/27/2022 0010 sodium chloride 0 9 % infusion 100 mL/hr Intravenous New Bag      09/26/2022 2306 insulin lispro (HumaLOG) 100 units/mL subcutaneous injection 5 Units 5 Units Subcutaneous Given         Past Medical History:   Diagnosis Date    Abdominal pain     Anxiety     Anxiety and depression     COVID-19 12/2020    Depression     Diabetes mellitus ([facility] [address] )     Disease of thyroid gland     Hashimoto    Eating disorder     history of anorexia/bulemia [id number]    Fracture of fibula     R Salter I   Gearldean Rosi disease     Hashimoto's disease 02/21/2020    Head injury     Headache(784 0)     Nasal congestion     PTSD (post-traumatic stress disorder)     Rectal bleed     Seizures (HCC)     Type 1 diabetes (HCC)      Present on Admission:   Right sided abdominal pain   DKA, type 1 (HCC)   Hashimoto's disease   Positive SOHAM (antinuclear antibody)   Amenorrhea      Admitting Diagnosis: Abdominal pain [R10 9]  DKA (diabetic ketoacidosis) ([facility] [address] ) [E11 10]  Age/Sex: 24 y o  female  Admission Orders:  Scheduled Medications:  cyanocobalamin, 1,000 mcg, Oral, Daily  folic acid, 1 mg, Oral, Daily  hydroxychloroquine, 200 mg, Oral, BID With Meals  insulin detemir, 10 Units, Subcutaneous, HSEnd: 09/27/22 1612  insulin lispro, 1-5 Units, Subcutaneous, 4 times dayEnd: 09/27/22 0910  insulin lispro, 1-5 Units, Subcutaneous, TID AC  levonorgestrel-ethinyl estradiol, 1 tablet, Oral, Daily  levothyroxine, 25 mcg, Oral, Early Morning  methotrexate, 15 mg, Oral, Weekly  pantoprazole, 40 mg, Oral, Daily  sucralfate, 1 g, Oral, BID    dextrose 50 % IV solution 25 mL  Dose: 25 mL  Freq:  Once Route: IV  Start: 09/27/22 0400 End: 09/27/22 0536  insulin lispro (HumaLOG) 100 units/mL subcutaneous injection 5 Units  Dose: 5 Units  Freq: 4 times daily (before meals and at bedtime) Route: SC  Indications of Use: TYPE 1 DIABETES MELLITUS  Start: 09/26/22 2300 End: 09/27/22 0910  insulin detemir (LEVEMIR) subcutaneous injection 15 Units  Dose: 15 Units  Freq: Daily at bedtime Route: SC  Indications of Use: TYPE 1 DIABETES MELLITUS  Start: 09/26/22 2230 End: 09/27/22 1118  nsulin detemir (LEVEMIR) subcutaneous injection 15 Units  Dose: 15 Units  Freq: Daily at bedtime Route: SC  Indications of Use: TYPE 1 DIABETES MELLITUS  Start: 09/27/22 2200  insulin lispro (HumaLOG) 100 units/mL subcutaneous injection 1-5 Units  Dose: 1-5 Units  Freq: Daily at bedtime Route: SC  Start: 09/27/22 2200      Continuous IV Infusions:  sodium chloride, 100 mL/hr, Intravenous, Continuous      PRN Meds:  acetaminophen, 650 mg, Oral, Q4H PRN x1 9/27  clonazePAM, 0 5 mg, Oral, BID PRN  gabapentin, 300 mg, Oral, PRN  HYDROmorphone, 1 mg, Intravenous, Q3H PRN  ondansetron, 4 mg, Intravenous, Q6H PRN x2 9/27  oxyCODONE, 10 mg, Oral, Q4H PRN  oxyCODONE, 5 mg, Oral, Q4H PRN x1 9/27  promethazine, 12 5 mg, Oral, Q6H PRN x1 9/27  sodium chloride (PF), 3 mL, Intravenous, Q1H PRN    level 2 carb diet   BMP q4h   monitor for hypoglycemia   POCT glucose      IP CONSULT TO ENDOCRINOLOGY    Network Utilization Review Department  ATTENTION: Please call with any questions or concerns to [phone] and carefully listen to the prompts so that you are directed to the right person  All voicemails are confidential   Kaela Langford all requests for admission clinical reviews, approved or denied determinations and any other requests to dedicated fax number below belonging to the campus where the patient is receiving treatment   List of dedicated fax numbers for the Facilities:  FACILITY NAME UR FAX NUMBER   ADMISSION DENIALS (Administrative/Medical Necessity) [phone]   [facility] [address] (Maternity/NICU/Pediatrics) [facility],[address]  [phone]   [address] [id number] [place] [address] [address] [facility] [address] [phone]

## 2022-09-27 NOTE — PROGRESS NOTES
[facility]  Progress Note - Marie Edwards 1995, 24 y o  female MRN: [id number]  Unit/Bed#: W -01 Encounter: [id number]  Primary Care Provider: MARGARET Simpson   Date and time admitted to hospital: 9/26/2022  4:20 PM    Positive SOHAM (antinuclear antibody)  Assessment & Plan  Patient states that she is being treated for SLE    Plan:  · Continue hydroxychloroquine  · Continue methotrexate    Amenorrhea  Assessment & Plan  Patient states that she is being treated for PCOS by OBGYN    Plan:  · Continue home OCP regimen, parents will bring  · Holding home metformin    Hashimoto's disease  Assessment & Plan  Assessment:   - TSH: 0 705 (on 9/26)    Plan:  - continue current dose of levothyroxine   - Will obtain free T4    Right sided abdominal pain  Assessment & Plan  Assessment:  · Longstanding with extensive workup with no definitive cause found  · CT abdomen pelvis with contrast this admission with no acute findings    Plan:  · P r n  pain regimen  · Nausea control with Zofran and phenergan    * DKA, type 1 [facility])  Assessment & Plan    Recent Labs     09/27/22  0631 09/27/22  0743 09/27/22  1031 09/27/22  1634   POCGLU 94 77 155* 216*     POA:  · Increased anion gap acidosis, positive beta hydroxybutyrate, urine glucose/ketones  · Initial sugar 272    Home regimen:  · Insulin pump  · Cedric Lambert  · Metformin (also for PCOS)    Assessment:  · Patient reports good glucose control other than day of admission  · Most recent A1c 7 5  · Anion gap closed in ED after IV dextrose/insulin  · Insulin drip stopped  · Patient transition to basal-bolus    Plan:  Blood Sugar Average: Last 72 hrs:  (P) 139 5    DKA resolved  Endocrinology consulted   Recommendations:  -change to Lantus 15 units at bedtime  -continue sliding scale, Humalog with meals, and custom sliding scale at bedtime, starting at 300 to avoid hypoglycemia at night  -once patient's appetite improves and no nausea vomiting will start fixed dose of Humalog with meals,  -would recommend discharge patient home on basal bolus insulin regimen  -continue to monitor blood sugars with meals and at bedtime, will recommend further changes as necessary  VTE Pharmacologic Prophylaxis: VTE Score: 2 Low Risk (Score 0-2) - Encourage Ambulation  Patient Centered Rounds: did not round with nurse  Discussions with Specialists or Other Care Team Provider:  Endocrinology    Education and Discussions with Family / Patient: Attempted to update  (significant other) via phone  Unable to contact  Current Length of Stay: 1 day(s)  Current Patient Status: Inpatient   Discharge Plan: Anticipate discharge tomorrow to home  Code Status: Level 1 - Full Code    Subjective: Today pt reports ongoing nausea and poor appettie along with recent vomiting and ongoing RUQ abdominal pain  Patient also reports urinary urgency with little output  Patient also reports occasional chest the sternal region that is worse when she feels exhales  Patient also reports some fatigue along with some chills  Objective:     Vitals:   Temp (24hrs), Av 3 °F (36 8 °C), Min:97 8 °F (36 6 °C), Max:99 °F (37 2 °C)    Temp:  [97 8 °F (36 6 °C)-99 °F (37 2 °C)] 99 °F (37 2 °C)  HR:  [72-83] 75  Resp:  [16-18] 18  BP: ()/(51-75) 102/69  SpO2:  [96 %-100 %] 97 %  Body mass index is 26 21 kg/m²  Input and Output Summary (last 24 hours): Intake/Output Summary (Last 24 hours) at 2022 1735  Last data filed at 2022 1341  Gross per 24 hour   Intake 1240 ml   Output --   Net 1240 ml       Physical Exam:   Physical Exam  Cardiovascular:      Heart sounds: Normal heart sounds, S1 normal and S2 normal    Pulmonary:      Effort: Pulmonary effort is normal       Breath sounds: Normal breath sounds  Abdominal:      Palpations: Abdomen is soft  Tenderness: There is abdominal tenderness in the right upper quadrant and right lower quadrant  There is guarding  Skin:     Coloration: Skin is pale  Neurological:      Mental Status: She is alert  Additional Data:     Labs:  Results from last 7 days   Lab Units 09/26/22  1700   WBC Thousand/uL 7 29   HEMOGLOBIN g/dL 13 9   HEMATOCRIT % 42 5   PLATELETS Thousands/uL 180   NEUTROS PCT % 65   LYMPHS PCT % 28   MONOS PCT % 6   EOS PCT % 0     Results from last 7 days   Lab Units 09/27/22  1059 09/26/22  1939 09/26/22  1700   SODIUM mmol/L 136   < > 134*   POTASSIUM mmol/L 3 8   < > 4 2   CHLORIDE mmol/L 108   < > 101   CO2 mmol/L 19*   < > 14*   BUN mg/dL 6   < > 13   CREATININE mg/dL 0 71   < > 0 87   ANION GAP mmol/L 9   < > 19*   CALCIUM mg/dL 8 4   < > 9 6   ALBUMIN g/dL  --   --  4 8   TOTAL BILIRUBIN mg/dL  --   --  2 09*   ALK PHOS U/L  --   --  54   ALT U/L  --   --  11   AST U/L  --   --  13   GLUCOSE RANDOM mg/dL 170*   < > 267*    < > = values in this interval not displayed           Results from last 7 days   Lab Units 09/27/22  1634 09/27/22  1031 09/27/22  0743 09/27/22  0631 09/27/22  0443 09/27/22  0253 09/27/22  0225 09/26/22  2326 09/26/22  2210 09/26/22  1959 09/26/22  1902 09/26/22  1819   POC GLUCOSE mg/dl 216* 155* 77 94 86 70 43* 196* 222* 78 133 172*         Results from last 7 days   Lab Units 09/26/22  1817   LACTIC ACID mmol/L 1 5       Lines/Drains:  Invasive Devices  Report    Peripheral Intravenous Line  Duration           Peripheral IV 09/26/22 Right Antecubital 1 day    Peripheral IV 09/26/22 Left Antecubital <1 day                      Imaging: Reviewed radiology reports from this admission including: abdominal/pelvic CT    Recent Cultures (last 7 days):         Last 24 Hours Medication List:   Current Facility-Administered Medications   Medication Dose Route Frequency Provider Last Rate    acetaminophen  650 mg Oral Q4H PRN Nils Mejia MD      clonazePAM  0 5 mg Oral BID PRN Landy Eisenmenger Storm Carl, MD      cyanocobalamin  1,000 mcg Oral Daily Nils Shankar MD      folic acid  1 mg Oral Daily Nils Shankar MD      gabapentin  300 mg Oral PRN Shakeel Shankar MD      HYDROmorphone  1 mg Intravenous Q3H PRN Shakeel Shankar MD      hydroxychloroquine  200 mg Oral BID With Meals Nils Shankar MD      insulin detemir  15 Units Subcutaneous HS Anika Castellanos MD      insulin lispro  1-5 Units Subcutaneous TID JOE Shannon MD      insulin lispro  1-5 Units Subcutaneous HS Anika Castellanos MD      levonorgestrel-ethinyl estradiol  1 tablet Oral Daily Nils Shankar MD      levothyroxine  25 mcg Oral Early Morning Nils Shankar MD      methotrexate  15 mg Oral Weekly Nils Shankar MD      ondansetron  4 mg Intravenous Q6H PRN Nils Shankar MD      oxyCODONE  10 mg Oral Q4H PRN Shakeel Shankar MD      oxyCODONE  5 mg Oral Q4H PRN Shakeel Shankar MD      pantoprazole  40 mg Oral Daily Bennie Tobias MD      promethazine  12 5 mg Oral Q6H PRN Shakeel Shankar MD      sodium chloride (PF)  3 mL Intravenous Q1H PRN Shakeel Shankar MD      sucralfate  1 g Oral BID Nils Shankar MD          Today, Patient Was Seen By: Claire Good MD    **Please Note: This note may have been constructed using a voice recognition system  **

## 2022-09-27 NOTE — ASSESSMENT & PLAN NOTE
Assessment:  · Longstanding with extensive workup with no definitive cause found  · CT abdomen pelvis with contrast this admission with no acute findings    Plan:  · P r n  pain regimen  · Nausea control with Zofran and phenergan

## 2022-09-27 NOTE — ED ATTENDING ATTESTATION
9/26/2022  ITara MD, saw and evaluated the patient  I have discussed the patient with the resident/non-physician practitioner and agree with the resident's/non-physician practitioner's findings, Plan of Care, and MDM as documented in the resident's/non-physician practitioner's note, except where noted  All available labs and Radiology studies were reviewed  I was present for key portions of any procedure(s) performed by the resident/non-physician practitioner and I was immediately available to provide assistance  At this point I agree with the current assessment done in the Emergency Department  I have conducted an independent evaluation of this patient a history and physical is as follows:    25 yo female with IDDM p/w abdominal pain as well as nausea/vomiting  Abdominal pain is right sided without obvious exacerbating/alleviating factors  No h/o fever  Vomitus is undigested food or occasionally bile  Denies diarrhea  Denies dark/tarry/black stool  Denies urinary or  symptoms  No h/o cp, sob, URI symptoms or other complaints  On exam pt dry appearing, diffuse TTP abdomen without s/s peritonitis  Work up c/w DKA, no obvious ischemia/infectious trigger  T  Bili noted to be elevated but not obvious 2/2 acute cholecystis, biliary dilation or stones on CT imaging  Pt resuscitated with flulids, started on insulin gtt in ED  Initial AG closed and pt able to be admitted for further work up evaluation as indicated      Past Medical History:   Diagnosis Date    Abdominal pain     Anxiety     Anxiety and depression     COVID-19 12/2020    Depression     Diabetes mellitus ([address] )     Disease of thyroid gland     Hashimoto    Eating disorder     history of anorexia/bulemia 0429-5730    Fracture of fibula     R Salter I   Lillette Crimes disease     Hashimoto's disease 02/21/2020    Head injury     Headache(784 0)     Nasal congestion     PTSD (post-traumatic stress disorder)     Rectal bleed     Seizures ([address] )     Type 1 diabetes [facility])            ED Course  ED Course as of 09/26/22 2238   [facility] Sep 26, 2022   1637 POC Glucose(!): 272  elevated   1651 RUQ US 9/19 - ABDOMEN ULTRASOUND, COMPLETE      INDICATION:   R10 11: Right upper quadrant pain  R10 9: Unspecified abdominal pain  Abdominal pain      COMPARISON:  CT scans from 9/9/2004 and 10/22/2019     TECHNIQUE:   Real-time ultrasound of the abdomen was performed with a curvilinear transducer with both volumetric sweeps and still imaging techniques      FINDINGS:     PANCREAS:  Visualized portions of the pancreas are within normal limits      AORTA AND IVC:  Visualized portions are normal for patient age      LIVER:  Size:  Within normal range  The liver measures 12 9 cm in the midclavicular line  Contour:  Surface contour is smooth  Parenchyma:  Echogenicity and echotexture are within normal limits  No liver mass identified  Limited imaging of the main portal vein shows it to be patent and hepatopetal      BILIARY:  No gallbladder findings  No intrahepatic biliary dilatation  CBD measures 3 0 mm  No choledocholithiasis      KIDNEY:   Right kidney measures 9 7 x 5 0 x 4 9  cm  Volume 125 3 mL  Kidney within normal limits      Left kidney measures 10 3 x 4 9 x 4 6 cm  Volume 120 5 mL  Kidney within normal limits      SPLEEN:   Measures 8 2 x 8 4 x 3 8 cm  Volume 135 5 mL  Within normal limits      ASCITES:  None      IMPRESSION:     Normal    1727 Comprehensive metabolic panel(!)  Minimally low sodium  Low bicarb and AG noted, glucose elevated, potassium ok, concern for DKA will initiate insulin   1728 CBC and differential(!)  No significant leukocytosis reassuring diff, normal H/H, normal platelets        1728 Blood gas, venous(!)  Minimally acidotic   1729 Magnesium(!): 1 8  Minimally low mag   1729 Phosphorus(!): 2 1   1742 Beta Hydroxybutyrate(!)  elevated   1742 UA w Reflex to Microscopic w Reflex to Culture(!)  Moderate blood, small LE but negative nitrites, glucosuria and ketones noted   1804 Urine Microscopic(!)  Not c/w UTI   1825 POC Glucose(!): 172  Improved sugars   1901 CT abdomen pelvis with contrast  IMPRESSION:     Normal appendix  No evidence of bowel obstruction, colitis or diverticulitis           Workstation performed: [id number]      1905 POC Glucose: 133  wnl   1907 LACTIC ACID: 1 5  wnl   2004 POC Glucose: 78  wnl   3398 Basic metabolic panel(!)  AG closed  Per CC ok for floors            Critical Care Time  CriticalCare Time  Performed by: Andrea Levine MD  Authorized by: Andrea Levine MD     Critical care provider statement:     Critical care time (minutes):  65    Critical care was necessary to treat or prevent imminent or life-threatening deterioration of the following conditions:  Metabolic crisis    Critical care was time spent personally by me on the following activities:  Blood draw for specimens, obtaining history from patient or surrogate, development of treatment plan with patient or surrogate, evaluation of patient's response to treatment, examination of patient, ordering and review of laboratory studies, ordering and review of radiographic studies, re-evaluation of patient's condition, review of old charts and ordering and performing treatments and interventions    I assumed direction of critical care for this patient from another provider in my specialty: no

## 2022-09-27 NOTE — PSYCH
Virtual Regular Visit    Verification of patient location:    Patient is located in the following state in which I hold an active license [place]      Assessment/Plan:    Problem List Items Addressed This Visit        Other    Chronic post-traumatic stress disorder (PTSD)    Generalized anxiety disorder with panic attacks    Bipolar affective disorder ([facility] [address] ) - Primary          Goals addressed in session: Goal 1 and Goal 2          Reason for visit is No chief complaint on file  Encounter provider Bryant Maxwell    Provider located at [address] [place] [id number] [phone]      Recent Visits  No visits were found meeting these conditions  Showing recent visits within past 7 days and meeting all other requirements  Today's Visits  Date Type Provider Dept   09/27/22 Telemedicine Bryant Maxwell [facility] Therapist Ivan   Showing today's visits and meeting all other requirements  Future Appointments  No visits were found meeting these conditions  Showing future appointments within next 150 days and meeting all other requirements       The patient was identified by name and date of birth  Amy Flores was informed that this is a telemedicine visit and that the visit is being conducted throughic Embedded and patient was informed this is a secure, HIPAA-complaint platform  She agrees to proceed     My office door was closed  No one else was in the room  She acknowledged consent and understanding of privacy and security of the video platform  The patient has agreed to participate and understands they can discontinue the visit at any time  Patient is aware this is a billable service  Subjective  Amy Flores is a 24 y o  female    D: Met with Chichi Otero individually  Currently in hospital for DKA  GI sent to ER after visit due to severe abdominal pain    Dr Garth Garcia this most likely being lupus flare up   Mom has been busy and unable to bring insulin pump for Dr  To calibrate  Mairayris Saleh remains at school  Came for 3 days - emotions resurface every time he leaves  Dad came to visit and insisted he stay - BODØ able to get him to leave  Still able to work in [employer] - getting as much done as possible  Depression has been triggered over last week - 'I haven't been this sick in awhile so I'm discouraged ' Sister is angry with BODØ - parents found some intimate apparel and BODØ wouldn't say it was hers  A: BODØ presented fatigued yet to be expected right now  Eating dinner during session which is progress  P: Continue individual therapy      HPI     Past Medical History:   Diagnosis Date    Abdominal pain     Anxiety     Anxiety and depression     COVID-19 12/2020    Depression     Diabetes mellitus ([address] )     Disease of thyroid gland     Hashimoto    Eating disorder     history of anorexia/bulemia 2666-0412    Fracture of fibula     R Salter I   Frederick Deidre disease     Hashimoto's disease 02/21/2020    Head injury     Headache(784 0)     Nasal congestion     PTSD (post-traumatic stress disorder)     Rectal bleed     Seizures (HCC)     Type 1 diabetes (HCC)        Past Surgical History:   Procedure Laterality Date    COLONOSCOPY      KNEE SURGERY Right 07/06/2020    NASAL SEPTOPLASTY W/ TURBINOPLASTY      SINUS SURGERY      SKIN BIOPSY      TURBINOPLASTY N/A 3/22/2021    Procedure: TURBINOPLASTY;  Surgeon: Kishore Monreal MD;  [facility]: [facility];  Service: ENT    UPPER GASTROINTESTINAL ENDOSCOPY      WISDOM TOOTH EXTRACTION      WRIST SURGERY      left; Excision of ganglion       No current facility-administered medications for this visit  No current outpatient medications on file       Facility-Administered Medications Ordered in Other Visits   Medication Dose Route Frequency Provider Last Rate Last Admin    acetaminophen (TYLENOL) tablet 650 mg  650 mg Oral Q4H PRN Nils Mccoy MD   650 mg at 09/27/22 0959    clonazePAM (KlonoPIN) tablet 0 5 mg  0 5 mg Oral BID PRN Nils Mccoy MD        cyanocobalamin (VITAMIN B-12) tablet 1,000 mcg  1,000 mcg Oral Daily Nils Mccoy MD   1,000 mcg at 50/74/42 1310    folic acid (FOLVITE) tablet 1 mg  1 mg Oral Daily Nils Mccoy MD        gabapentin (NEURONTIN) capsule 300 mg  300 mg Oral PRN Nils Mccoy MD        HYDROmorphone (DILAUDID) injection 1 mg  1 mg Intravenous Q3H PRN Nils Mccoy MD        hydroxychloroquine (PLAQUENIL) tablet 200 mg  200 mg Oral BID With Meals Nils Mccoy MD   200 mg at 09/27/22 1711    insulin detemir (LEVEMIR) subcutaneous injection 15 Units  15 Units Subcutaneous HS Wilma Ybarra MD        insulin lispro (HumaLOG) 100 units/mL subcutaneous injection 1-5 Units  1-5 Units Subcutaneous TID AC Vincent Matthew MD   1 Units at 09/27/22 1711    insulin lispro (HumaLOG) 100 units/mL subcutaneous injection 1-5 Units  1-5 Units Subcutaneous HS Wilma Ybarra MD        levonorgestrel-ethinyl estradiol (NORDETTE) 0 15-30 MG-MCG per tablet 1 tablet  1 tablet Oral Daily Nils Mccoy MD        levothyroxine tablet 25 mcg  25 mcg Oral Early Morning Nils Mccoy MD        methotrexate tablet 15 mg  15 mg Oral Weekly Nils Mccoy MD        ondansetron [facility]) injection 4 mg  4 mg Intravenous Q6H PRN Nils Mccoy MD   4 mg at 09/27/22 1712    oxyCODONE (ROXICODONE) immediate release tablet 10 mg  10 mg Oral Q4H PRN Nils Mccoy MD        oxyCODONE (ROXICODONE) IR tablet 5 mg  5 mg Oral Q4H PRN Nils Mccoy MD   5 mg at 09/27/22 0103    pantoprazole (PROTONIX) EC tablet 40 mg  40 mg Oral Daily Nils Mccoy MD   40 mg at 09/27/22 319    promethazine (PHENERGAN) tablet 12 5 mg  12 5 mg Oral Q6H PRN Nils Anderson MD   12 5 mg at 09/27/22 0849    sodium chloride (PF) 0 9 % injection 3 mL  3 mL Intravenous Q1H PRN Nils Anderson MD        sucralfate (CARAFATE) tablet 1 g  1 g Oral BID Nils Anderson MD   1 g at 09/27/22 1711        Allergies   Allergen Reactions    Insulin Glargine Other (See Comments)     LANTUS BRAND -Burning and redness under skin        Review of Systems    Video Exam    There were no vitals filed for this visit      Physical Exam     I spent 50 minutes directly with the patient during this visit   [id number]

## 2022-09-27 NOTE — CONSULTS
Consultation - Mandi Gillette 24 y o  female MRN: [id number]    Unit/Bed#: W -01 Encounter: [id number]      Assessment/Plan     Assessment: This is a 24y o -year-old female with diabetes with hyperglycemia  Patient has type 1 diabetes diagnosed in 2018  Greater than 10 episodes of DKA according to patient  Last DKA was August 2021  Patient has temp them insulin pump and Dexcom G6  Patient was unable to bring insulin pump however will bring it for tomorrow  DEXCOM reviewed: 30 days shows On review the patient's average glucose was 184, standard deviation of 81, 49% in range  HBA1c 7 1 (1/3) and 7 5 (7/7)  Came in with DKA  Weaned off of insulin drip and placed on detemir 15 units sc and sliding scale  Patient had one hypoglycemic episode at 2am last night corrected with Juic x2 and 1/2 amp of dextrose  Today BS 77 prebreakfast, patient was given 1/2 banana and 1/2 yogurt and blood sugar levels went up to 155 170  She still has GI symptoms nausea abdominal pain, no vomiting today    Plan:  Continue detemir 15 units once daily  Modified sliding scale at night  Insulin sliding scale with meals   Glucose checks  Advised her to bring insulin pump so that we can check settings         Inpatient consult to Endocrinology     Performed by  Anika Castellanos MD     Authorized by Júnior Shannon MD              CC:  Patient had DKA, type 1 diabetes    History of Present Illness     HPI: Mandi Gillette is a 24y o  year old female with type 1 diabetes diagnosed 2018  She also has past medical history of connective tissue disease hydroxychloroquine, methotrexat  She has a history of right upper quadrant abdominal pain which has been worked up by GI  According to the patient had greater than 10 episodes of DKA , hospitalization was in August 21  She usually never misses doses  According to the patient she has had good control of her diabetes and blood sugar levels over the past month    Her Hba1c appears to be trending up  7 1 (1/3) and 7 5 ()  She has a tandem insulin pump and a Dexcom G6 continuous blood glucose monitor  On review the patient's average glucose was 184, standard deviation of 81, 49% in range  She has been having issues with her glucose monitor for the past month  There would be times when it with say that it is extremely high are extremely low however when the patient confirms it is actually within normal range  On , upon waking up she had blood sugar of 400s and positive ketones in her urine  During this time she claimed that for the past 2 days she had switched off of her tandem pump and has used On Saturday the patient's insulin pump  and she switched to [place] 32 units once daily and NovoLog with carbohydrate counting and correction  She took 14 units of NovoLog this time  According to her her blood sugars trended down  At this time she did have polydipsia and polyuria  She had abdominal pain, nausea, vomiting however this is been steady for the past 1 month  Of note she has had this chronic abdominal pain for the past year which has worsened since August of this year  What initially was a dull right upper quadrant pain became more of a sharp right upper quadrant pain associated with nausea and vomiting  Patient was noted to vomit at least once per night  She followed up with her gastroenterologist to because of the abdominal pain and positive ketones advised her to go to the emergency department for further workup  They are considering a biliary etiology and will with HIDA scan  Patient presented with she VBG pH of 7 2, HC03 13 7, anion gap of 19, the hydroxybutyrate 5 1, +4 ketones in the urine  CT scan was unremarkable   Placed on insulin drip in the emergency department  Glucose trend down, anion gap was closed and patient was shifted to detemir 15 units  Patient was shifted off of insulin pump on 8:00 p m   And detemir 15 units was given at 11:00 p m     With 5 units of lispro  According to the patient she did not eat a meal at this time and did not eat dinner  At 2:00 a m  She was found to be hypoglycemic, for which 2 packs of juice was given a half amp of dextrose  7:00 a m  Patient's blood glucose was 77, patient ate a banana and half a over  At 10:00 a m  Was glucose went up to 155  Patient still has persisted abdominal pain  Her regimen is currently     Review of Systems   Constitutional: Positive for unexpected weight change  Negative for chills and fever  HENT: Negative for ear pain and sore throat  Eyes: Negative for pain and visual disturbance  Respiratory: Negative for cough and shortness of breath  Cardiovascular: Negative for chest pain and palpitations  Gastrointestinal: Positive for abdominal distention, abdominal pain, nausea and vomiting  Genitourinary: Negative for dysuria and hematuria  Musculoskeletal: Negative for arthralgias and back pain  Skin: Negative for color change and rash  Neurological: Negative for seizures and syncope  All other systems reviewed and are negative        Historical Information   Past Medical History:   Diagnosis Date    Abdominal pain     Anxiety     Anxiety and depression     COVID-19 12/2020    Depression     Diabetes mellitus ([facility] [address] )     Disease of thyroid gland     Hashimoto    Eating disorder     history of anorexia/bulemia [id number]    Fracture of fibula     R Salter I   Theador Bear disease     Hashimoto's disease 02/21/2020    Head injury     Headache(784 0)     Nasal congestion     PTSD (post-traumatic stress disorder)     Rectal bleed     Seizures (HCC)     Type 1 diabetes ([facility] [address] )      Past Surgical History:   Procedure Laterality Date    COLONOSCOPY      KNEE SURGERY Right 07/06/2020    NASAL SEPTOPLASTY W/ TURBINOPLASTY      SINUS SURGERY      SKIN BIOPSY      TURBINOPLASTY N/A 3/22/2021    Procedure: Raquel Sarkar;  Surgeon: Faviola Gonzaelz Jett Lynn MD;  Location: [facility];  Service: ENT    UPPER GASTROINTESTINAL ENDOSCOPY      WISDOM TOOTH EXTRACTION      WRIST SURGERY      left;  Excision of ganglion     Social History   Social History     Substance and Sexual Activity   Alcohol Use Not Currently     Social History     Substance and Sexual Activity   Drug Use Never     Social History     Tobacco Use   Smoking Status Never Smoker   Smokeless Tobacco Never Used   Tobacco Comment    Tobacco smoke exposure (Father smokes cigars)     Family History:   Family History   Problem Relation Age of Onset    Hypertension Mother    Jullie Liming Migraines Mother         Headache    Diabetes type II Mother     Varicose Veins Mother     Hyperlipidemia Mother     Diabetes Mother     Arthritis Mother     Depression Mother     Hearing loss Mother     Anxiety disorder Mother     Cholelithiasis Father     Hypertension Father     Sarcoidosis Father         Liver    Hyperlipidemia Father     Diabetes Father     Coronary artery disease Father     Nephrolithiasis Father     Cirrhosis Father     Alcohol abuse Father     Thyroid disease Sister     Hashimoto's thyroiditis Sister     Cancer Family     Diabetes Family     Hypertension Family     Alcohol abuse Brother        Meds/Allergies   Current Facility-Administered Medications   Medication Dose Route Frequency Provider Last Rate Last Admin    acetaminophen (TYLENOL) tablet 650 mg  650 mg Oral Q4H PRN Nils Clark MD   650 mg at 09/27/22 0959    clonazePAM (KlonoPIN) tablet 0 5 mg  0 5 mg Oral BID PRN Nils Clark MD        cyanocobalamin (VITAMIN B-12) tablet 1,000 mcg  1,000 mcg Oral Daily Nisl Clark MD   1,000 mcg at 60/43/47 2888    folic acid (FOLVITE) tablet 1 mg  1 mg Oral Daily Nils Clark MD        gabapentin (NEURONTIN) capsule 300 mg  300 mg Oral PRN Nils Clark MD        HYDROmorphone (DILAUDID) injection 1 mg  1 mg Intravenous Q3H PRN Nils Crews MD        hydroxychloroquine (PLAQUENIL) tablet 200 mg  200 mg Oral BID With Meals Nils Crews MD   200 mg at 09/27/22 1105    insulin detemir (LEVEMIR) subcutaneous injection 10 Units  10 Units Subcutaneous HS Maria Elena Monte MD        insulin lispro (HumaLOG) 100 units/mL subcutaneous injection 1-5 Units  1-5 Units Subcutaneous TID AC Maria Elena Monte MD        levonorgestrel-ethinyl estradiol (NORDETTE) 0 15-30 MG-MCG per tablet 1 tablet  1 tablet Oral Daily Nils Crews MD        levothyroxine tablet 25 mcg  25 mcg Oral Early Morning Nils Crews MD        methotrexate tablet 15 mg  15 mg Oral Weekly Nils Crews MD        ondansetron [facility]) injection 4 mg  4 mg Intravenous Q6H PRN Nils Crews MD   4 mg at 09/27/22 0803    oxyCODONE (ROXICODONE) immediate release tablet 10 mg  10 mg Oral Q4H PRN Nils Crews MD        oxyCODONE (ROXICODONE) IR tablet 5 mg  5 mg Oral Q4H PRN Nils Crews MD   5 mg at 09/27/22 0103    pantoprazole (PROTONIX) EC tablet 40 mg  40 mg Oral Daily Nils Crews MD   40 mg at 09/27/22 9221    promethazine (PHENERGAN) tablet 12 5 mg  12 5 mg Oral Q6H PRN Nils Crews MD   12 5 mg at 09/27/22 0849    sodium chloride (PF) 0 9 % injection 3 mL  3 mL Intravenous Q1H PRN Nils Crews MD        sucralfate (CARAFATE) tablet 1 g  1 g Oral BID Nils Crews MD   1 g at 09/27/22 0849     Allergies   Allergen Reactions    Insulin Glargine Other (See Comments)     LANTUS BRAND -Burning and redness under skin        Objective   Vitals: Blood pressure 98/61, pulse 83, temperature 98 1 °F (36 7 °C), resp  rate 17, height 5' 2" (1 575 m), weight 65 kg (143 lb 4 8 oz), SpO2 96 %, not currently breastfeeding      Intake/Output Summary (Last 24 hours) at 9/27/2022 1352  Last data filed at 9/27/2022 1341  Gross per 24 hour   Intake 1240 ml   Output --   Net 1240 ml     Invasive Devices  Report    Peripheral Intravenous Line  Duration           Peripheral IV 09/26/22 Left Antecubital <1 day    Peripheral IV 09/26/22 Right Antecubital <1 day                Physical Exam  Constitutional:       Appearance: She is ill-appearing  She is not toxic-appearing  HENT:      Right Ear: There is no impacted cerumen  Left Ear: There is no impacted cerumen  Mouth/Throat:      Mouth: Mucous membranes are moist       Pharynx: Oropharynx is clear  Eyes:      Pupils: Pupils are equal, round, and reactive to light  Cardiovascular:      Rate and Rhythm: Normal rate and regular rhythm  Pulses: Normal pulses  Heart sounds: Normal heart sounds  Pulmonary:      Effort: No respiratory distress  Abdominal:      General: Bowel sounds are normal  There is no distension  There are no signs of injury  Palpations: There is no shifting dullness, fluid wave, hepatomegaly, splenomegaly, mass or pulsatile mass  Tenderness: There is abdominal tenderness (Righgt upper quadrant)  Hernia: No hernia is present  There is no hernia in the umbilical area or ventral area  Musculoskeletal:      Cervical back: Normal range of motion  The history was obtained from the review of the chart, patient      Lab Results:       Lab Results   Component Value Date    WBC 7 29 09/26/2022    HGB 13 9 09/26/2022    HCT 42 5 09/26/2022    MCV 91 09/26/2022     09/26/2022     Lab Results   Component Value Date/Time    BUN 6 09/27/2022 10:59 AM    BUN 16 12/08/2020 12:05 PM    K 3 8 09/27/2022 10:59 AM    K 4 2 12/08/2020 12:05 PM     09/27/2022 10:59 AM     12/08/2020 12:05 PM    CO2 19 (L) 09/27/2022 10:59 AM    CO2 27 12/08/2020 12:05 PM    CREATININE 0 71 09/27/2022 10:59 AM    AST 13 09/26/2022 05:00 PM    AST 17 02/11/2021 02:55 PM    ALT 11 09/26/2022 05:00 PM    ALT 13 02/11/2021 02:55 PM    ALB 4 8 09/26/2022 05:00 PM    GLOB 2 9 02/11/2021 02:55 PM     No results for input(s): CHOL, HDL, LDL, TRIG, VLDL in the last 72 hours  No results found for: Joel Murillo  POC Glucose (mg/dl)   Date Value   09/27/2022 155 (H)   09/27/2022 77       Imaging Studies: I have personally reviewed pertinent reports  Portions of the record may have been created with voice recognition software

## 2022-09-27 NOTE — H&P
[facility]  H&P- [facility] 1995, 24 y o  female MRN: [id number]  Unit/Bed#: ED-05 Encounter: [id number]  Primary Care Provider: MARGARET Turcios   Date and time admitted to hospital: 9/26/2022  4:20 PM    * DKA, type 1 [facility])  Assessment & Plan    Recent Labs     09/26/22  1819 09/26/22  1902 09/26/22  1959 09/26/22  2210   POCGLU 172* 133 78 222*     POA:  · Increased anion gap acidosis, positive beta hydroxybutyrate, urine glucose/ketones  · Initial sugar 272    Home regimen:  · Insulin pump  · [place]  · Metformin (also for PCOS)    Assessment:  · Patient reports good glucose control other than day of admission  · Most recent A1c 7 5  · Anion gap closed in ED after IV dextrose/insulin  · Insulin drip stopped  · Patient transition to basal-bolus    Plan:  · Carb controlled diet  · 15 units Levemir q h s  · Patient states that parents will bring [place] from home  · 5 units NovoLog t i d  with meals  · SSI algorithm 2  · Hold metformin  · Transition to normal saline    Right sided abdominal pain  Assessment & Plan  Assessment:  · Longstanding with extensive workup with no definitive cause found  · CT abdomen pelvis with contrast this admission with no acute findings    Plan:  · P r n  pain regimen  · Nausea control with Zofran and phenergan    Positive SOHAM (antinuclear antibody)  Assessment & Plan  Patient states that she is being treated for SLE    Plan:  · Continue hydroxychloroquine  · Continue methotrexate    Amenorrhea  Assessment & Plan  Patient states that she is being treated for PCOS by OBGYN    Plan:  · Continue home OCP regimen, parents will bring  · Holding home metformin    Hashimoto's disease  Assessment & Plan  Continue levothyroxine    VTE Pharmacologic Prophylaxis: VTE Score: 2 Low Risk (Score 0-2) - Encourage Ambulation  Code Status: Level 1 - Full Code   Discussion with family: Updated  (father and mother) at bedside      Anticipated Length of Stay: Patient will be admitted on an inpatient basis with an anticipated length of stay of greater than 2 midnights secondary to DKA  Chief Complaint:  Abdominal pain    History of Present Illness:  Osmany Santillan is a 24 y o  female with a PMH of DM 1 on insulin, bipolar disorder, PTSD, anxiety disorder, OCD, seizure disorder, SLE-like illness, Hashimoto's thyroiditis, PCOS  who presents with abdominal pain  Patient has had right-sided pain/bowel issues for the past 3 years  Workup as an outpatient included HIDA scan, gastric emptying study, upper GI series, RUQ U/S, EGD,colonoscopy, and CT chest PE study  EGD was notable for gastritis  Gastric biopsies were notable for gastric intestinal metaplasia  Other studies normal     These issues began to resolve this winter/spring but began to reappear around August this year  This pain has worsened over the past week  Has not had an appetite over the past week  She reports increasing N/V when eating, especially with fatty foods  Abdominal pain is in RUQ, radiating to the right flank that she describes as a constant dull pain with paroxysms of sharp pain  These paroxysms are associated with nausea/vomiting  Pain is worse with eating and certain movements, such as forward flexion  The pain is not relieved by anything in particular     Patient is a GI appointment today when it was noticed that she did not look well  Patient was advised to go to the emergency department  Workup in the ED reveals increased anion gap, elevated beta hydroxybutyrate, positive urine ketones/glucose  CT abdomen pelvis with contrast unremarkable  Patient was started on IV dextrose and insulin for DKA  Anion gap closed  Critical care made aware and cleared patient for admission to medical floors  Patient's abdominal pain/nausea was symptomatically treated with Zofran/morphine  Patient also reports urinary urgency but decreased urine production    Urine is foul smelling  She endorses floating/mucousy stools for one week  She also reports occasional chest pain which is sharp, substernal,  improved with famotidine  It was this chest pain initially which resulted in PE study in [facility] in September  Patient also reports occasional hot flashes/flushing with no fever  She also reports easy brusing at insulin pump sites x1 week  Patient endorses approximately 10 lb weight loss over the past few months  Patient is sexually active and uses barrier protection and OCP  Denies history of STD, vaginal bleeding/discharge  Patient endorses occasional spotting but last menstrual period was in in 2018  Follows with Ob/Gyn  On OCP for PCOS  Review of Systems:  Review of Systems   Constitutional: Positive for appetite change, fatigue and unexpected weight change  Negative for chills and fever  Respiratory: Negative for chest tightness and shortness of breath  Cardiovascular: Negative for chest pain and palpitations  Gastrointestinal: Positive for abdominal pain, nausea and vomiting  Negative for abdominal distention, blood in stool, constipation and diarrhea  Genitourinary: Positive for urgency  Negative for difficulty urinating, dysuria, flank pain, frequency, hematuria, pelvic pain, vaginal bleeding, vaginal discharge and vaginal pain  Skin: Negative for rash  Neurological: Negative for tremors, syncope and weakness  Hematological: Bruises/bleeds easily  Psychiatric/Behavioral: Negative for behavioral problems         Past Medical and Surgical History:   Past Medical History:   Diagnosis Date    Abdominal pain     Anxiety     Anxiety and depression     COVID-19 12/2020    Depression     Diabetes mellitus ([address] )     Disease of thyroid gland     Hashimoto    Eating disorder     history of anorexia/bulemia [id number]    Fracture of fibula     R Salter I   [facility] disease     Hashimoto's disease 02/21/2020    Head injury     Headache(784 0)     Nasal congestion     PTSD (post-traumatic stress disorder)     Rectal bleed     Seizures (HCC)     Type 1 diabetes (HCC)        Past Surgical History:   Procedure Laterality Date    COLONOSCOPY      KNEE SURGERY Right 07/06/2020    NASAL SEPTOPLASTY W/ TURBINOPLASTY      SINUS SURGERY      SKIN BIOPSY      TURBINOPLASTY N/A 3/22/2021    Procedure: Enrique Fears;  Surgeon: Matty Lai MD;  Location: [facility];  Service: ENT    UPPER GASTROINTESTINAL ENDOSCOPY      WISDOM TOOTH EXTRACTION      WRIST SURGERY      left; Excision of ganglion       Meds/Allergies:  Prior to Admission medications    Medication Sig Start Date End Date Taking?  Authorizing Provider   calcium carbonate (TUMS) 500 mg chewable tablet Chew 1 tablet (500 mg total) daily 8/31/22   Ayala Lucio MD   clonazePAM (KlonoPIN) 0 5 mg tablet Take 1 tablet (0 5 mg total) by mouth 2 (two) times a day 6/3/22   Chan Hill MD   famotidine (PEPCID) 20 mg tablet Take 1 tablet (20 mg total) by mouth 2 (two) times a day for 14 days  Patient not taking: Reported on 9/26/2022 8/31/22 9/22/22  Ayala Lucio MD   folic acid (FOLVITE) 1 mg tablet Take 1 tablet (1 mg total) by mouth daily 6/22/22   Rocky Geronimo MD   gabapentin (Neurontin) 300 mg capsule Take 1 capsule (300 mg total) by mouth daily at bedtime 6/8/22   Chino Christie MD   hydroxychloroquine (PLAQUENIL) 200 mg tablet TAKE 1 TABLET (200 MG TOTAL) BY MOUTH 2 (TWO) TIMES A DAY WITH MEALS 2/4/22 9/22/22  Rocky Geronimo MD   insulin aspart (NovoLOG) 100 units/mL injection Inject  Units under the skin    Historical Provider, MD   Insulin Pen Needle (BD PEN NEEDLE NASIM U/F) 32G X 4 MM MISC by Does not apply route 4 (four) times a day for 180 days 6/5/19 9/22/22  Janessa Mondragon PA-C   levonorgestrel-ethinyl estradiol (Altavera) 0 15-30 MG-MCG per tablet Take 1 tablet by mouth daily 8/7/22   Tenzin Leal MD   levothyroxine 25 mcg tablet Take 1 tablet (25 mcg total) by mouth daily 5/19/21   MARGARET Faustin   metFORMIN (GLUCOPHAGE-XR) 500 mg 24 hr tablet 1,000 mg 2 (two) times a day with meals   9/22/21   Historical Provider, MD   methotrexate 2 5 mg tablet Take 6 tabs once weekly  7/31/22   Brandie Rogers MD   pantoprazole (PROTONIX) 40 mg tablet Take 1 tablet (40 mg total) by mouth in the morning  5/16/22 9/26/22  Yolanda Sheehan PA-C   promethazine (PHENERGAN) 12 5 MG tablet Take 1 tablet (12 5 mg total) by mouth every 6 (six) hours as needed for nausea or vomiting 9/26/22   Maddi Hale MD   sucralfate (CARAFATE) 1 g/10 mL suspension Take 10 mL (1 g total) by mouth 2 (two) times a day 9/26/22 10/26/22  Maddi Hale MD     I have reviewed home medications with patient personally  Allergies:    Allergies   Allergen Reactions    Insulin Glargine Other (See Comments)     LANTUS BRAND -Burning and redness under skin        Social History:  Marital Status: Single   Occupation:  Disability  Patient Pre-hospital Living Situation: Home  Patient Pre-hospital Level of Mobility: walks  Patient Pre-hospital Diet Restrictions:  None  Substance Use History:   Social History     Substance and Sexual Activity   Alcohol Use Not Currently     Social History     Tobacco Use   Smoking Status Never Smoker   Smokeless Tobacco Never Used   Tobacco Comment    Tobacco smoke exposure (Father smokes cigars)     Social History     Substance and Sexual Activity   Drug Use Never       Family History:  Family History   Problem Relation Age of Onset    Hypertension Mother     Migraines Mother         Headache    Diabetes type II Mother     Varicose Veins Mother     Hyperlipidemia Mother     Diabetes Mother     Arthritis Mother     Depression Mother     Hearing loss Mother     Anxiety disorder Mother     Cholelithiasis Father     Hypertension Father     Sarcoidosis Father         Liver    Hyperlipidemia Father     Diabetes Father     Coronary artery disease Father     Nephrolithiasis Father     Cirrhosis Father     Alcohol abuse Father     Thyroid disease Sister     Hashimoto's thyroiditis Sister     Cancer Family     Diabetes Family     Hypertension Family     Alcohol abuse Brother        Physical Exam:     Vitals:   Blood Pressure: 90/51 (09/26/22 2200)  Pulse: 80 (09/26/22 2200)  Temperature: 98 1 °F (36 7 °C) (09/26/22 1615)  Respirations: 16 (09/26/22 2200)  SpO2: 97 % (09/26/22 2200)    Physical Exam  Vitals and nursing note reviewed  Constitutional:       General: She is not in acute distress  Appearance: She is well-developed  HENT:      Head: Normocephalic and atraumatic  Mouth/Throat:      Mouth: Mucous membranes are moist    Eyes:      Conjunctiva/sclera: Conjunctivae normal    Cardiovascular:      Rate and Rhythm: Normal rate and regular rhythm  Heart sounds: No murmur heard  Pulmonary:      Effort: Pulmonary effort is normal  No respiratory distress  Breath sounds: Normal breath sounds  Abdominal:      General: Bowel sounds are normal  There is no distension  Palpations: Abdomen is soft  Tenderness: There is abdominal tenderness  There is no right CVA tenderness or left CVA tenderness  Comments:   Tenderness worst right upper quadrant  No peritoneal signs   Musculoskeletal:      Cervical back: Neck supple  Right lower leg: No edema  Left lower leg: No edema  Skin:     General: Skin is warm and dry  Findings: No lesion or rash  Neurological:      General: No focal deficit present  Mental Status: She is alert and oriented to person, place, and time     Psychiatric:         Mood and Affect: Mood normal          Behavior: Behavior normal           Additional Data:     Lab Results:  Results from last 7 days   Lab Units 09/26/22  1700   WBC Thousand/uL 7 29   HEMOGLOBIN g/dL 13 9   HEMATOCRIT % 42 5   PLATELETS Thousands/uL 180   NEUTROS PCT % 65   LYMPHS PCT % 28   MONOS PCT % 6   EOS PCT % 0     Results from last 7 days   Lab Units 09/26/22  1939 09/26/22  1700   SODIUM mmol/L 134* 134*   POTASSIUM mmol/L 3 8 4 2   CHLORIDE mmol/L 106 101   CO2 mmol/L 18* 14*   BUN mg/dL 11 13   CREATININE mg/dL 0 78 0 87   ANION GAP mmol/L 10 19*   CALCIUM mg/dL 8 9 9 6   ALBUMIN g/dL  --  4 8   TOTAL BILIRUBIN mg/dL  --  2 09*   ALK PHOS U/L  --  54   ALT U/L  --  11   AST U/L  --  13   GLUCOSE RANDOM mg/dL 96 267*         Results from last 7 days   Lab Units 09/26/22  2210 09/26/22  1959 09/26/22  1902 09/26/22  1819 09/26/22  1628   POC GLUCOSE mg/dl 222* 78 133 172* 272*         Results from last 7 days   Lab Units 09/26/22  1817   LACTIC ACID mmol/L 1 5       Imaging: Reviewed radiology reports from this admission including: abdominal/pelvic CT and Personally reviewed the following imaging: abdominal/pelvic CT  CT abdomen pelvis with contrast   Final Result by Juan Alberto Dwyer MD (09/26 1856)      Normal appendix  No evidence of bowel obstruction, colitis or diverticulitis  Workstation performed: [id number]             EKG and Other Studies Reviewed on Admission:   · EKG: No EKG obtained  ** Please Note: This note has been constructed using a voice recognition system   **

## 2022-09-27 NOTE — ASSESSMENT & PLAN NOTE
Lab Results   Component Value Date    HGBA1C 7 5 (H) 07/07/2022       Recent Labs     09/27/22  0631 09/27/22  0743 09/27/22  1031 09/27/22  1634   POCGLU 94 77 155* 216*       Blood Sugar Average: Last 72 hrs:  (P) 455 [id number]     DKA resolved  Endocrinology consulted  Recommendations:  -change to Lantus 15 units at bedtime  -continue sliding scale, Humalog with meals, and custom sliding scale at bedtime, starting at 300 to avoid hypoglycemia at night  -once patient's appetite improves and no nausea vomiting will start fixed dose of Humalog with meals,  -would recommend discharge patient home on basal bolus insulin regimen  -continue to monitor blood sugars with meals and at bedtime, will recommend further changes as necessary

## 2022-09-27 NOTE — ASSESSMENT & PLAN NOTE
Patient states that she is being treated for PCOS by OBGYN    Plan:  · Continue home OCP regimen, parents will bring  · Holding home metformin

## 2022-09-28 PROBLEM — E10.10 DKA, TYPE 1 (HCC): Status: RESOLVED | Noted: 2021-05-11 | Resolved: 2022-09-28

## 2022-09-28 PROBLEM — K59.00 CONSTIPATION: Status: ACTIVE | Noted: 2022-09-28

## 2022-09-28 PROBLEM — E44.0 MODERATE PROTEIN-CALORIE MALNUTRITION (HCC): Status: ACTIVE | Noted: 2022-09-28

## 2022-09-28 LAB
ANION GAP SERPL CALCULATED.3IONS-SCNC: 9 MMOL/L (ref 4–13)
BUN SERPL-MCNC: 8 MG/DL (ref 5–25)
CALCIUM SERPL-MCNC: 8.7 MG/DL (ref 8.4–10.2)
CHLORIDE SERPL-SCNC: 104 MMOL/L (ref 96–108)
CO2 SERPL-SCNC: 22 MMOL/L (ref 21–32)
CREAT SERPL-MCNC: 0.74 MG/DL (ref 0.6–1.3)
ERYTHROCYTE [DISTWIDTH] IN BLOOD BY AUTOMATED COUNT: 13.5 % (ref 11.6–15.1)
GFR SERPL CREATININE-BSD FRML MDRD: 112 ML/MIN/1.73SQ M
GLUCOSE SERPL-MCNC: 189 MG/DL (ref 65–140)
GLUCOSE SERPL-MCNC: 212 MG/DL (ref 65–140)
GLUCOSE SERPL-MCNC: 238 MG/DL (ref 65–140)
GLUCOSE SERPL-MCNC: 240 MG/DL (ref 65–140)
GLUCOSE SERPL-MCNC: 256 MG/DL (ref 65–140)
GLUCOSE SERPL-MCNC: 314 MG/DL (ref 65–140)
HCT VFR BLD AUTO: 37.1 % (ref 34.8–46.1)
HGB BLD-MCNC: 12.4 G/DL (ref 11.5–15.4)
MCH RBC QN AUTO: 29.6 PG (ref 26.8–34.3)
MCHC RBC AUTO-ENTMCNC: 33.4 G/DL (ref 31.4–37.4)
MCV RBC AUTO: 89 FL (ref 82–98)
PLATELET # BLD AUTO: 170 THOUSANDS/UL (ref 149–390)
PMV BLD AUTO: 12.5 FL (ref 8.9–12.7)
POTASSIUM SERPL-SCNC: 3.7 MMOL/L (ref 3.5–5.3)
RBC # BLD AUTO: 4.19 MILLION/UL (ref 3.81–5.12)
SODIUM SERPL-SCNC: 135 MMOL/L (ref 135–147)
WBC # BLD AUTO: 6.41 THOUSAND/UL (ref 4.31–10.16)

## 2022-09-28 PROCEDURE — 82948 REAGENT STRIP/BLOOD GLUCOSE: CPT

## 2022-09-28 PROCEDURE — 85027 COMPLETE CBC AUTOMATED: CPT

## 2022-09-28 PROCEDURE — 80048 BASIC METABOLIC PNL TOTAL CA: CPT

## 2022-09-28 PROCEDURE — 99232 SBSQ HOSP IP/OBS MODERATE 35: CPT | Performed by: INTERNAL MEDICINE

## 2022-09-28 RX ORDER — DOCUSATE SODIUM 100 MG/1
100 CAPSULE, LIQUID FILLED ORAL 2 TIMES DAILY
Status: DISCONTINUED | OUTPATIENT
Start: 2022-09-28 | End: 2022-09-29 | Stop reason: HOSPADM

## 2022-09-28 RX ORDER — SIMETHICONE 80 MG
80 TABLET,CHEWABLE ORAL EVERY 6 HOURS PRN
Status: DISCONTINUED | OUTPATIENT
Start: 2022-09-28 | End: 2022-09-29 | Stop reason: HOSPADM

## 2022-09-28 RX ORDER — POLYETHYLENE GLYCOL 3350 17 G/17G
17 POWDER, FOR SOLUTION ORAL DAILY PRN
Status: DISCONTINUED | OUTPATIENT
Start: 2022-09-28 | End: 2022-09-29 | Stop reason: HOSPADM

## 2022-09-28 RX ORDER — INSULIN LISPRO 100 [IU]/ML
3 INJECTION, SOLUTION INTRAVENOUS; SUBCUTANEOUS
Status: DISCONTINUED | OUTPATIENT
Start: 2022-09-28 | End: 2022-09-29 | Stop reason: HOSPADM

## 2022-09-28 RX ADMIN — INSULIN LISPRO 3 UNITS: 100 INJECTION, SOLUTION INTRAVENOUS; SUBCUTANEOUS at 17:08

## 2022-09-28 RX ADMIN — PROMETHAZINE HYDROCHLORIDE 12.5 MG: 25 TABLET ORAL at 13:40

## 2022-09-28 RX ADMIN — PANTOPRAZOLE SODIUM 40 MG: 40 TABLET, DELAYED RELEASE ORAL at 05:03

## 2022-09-28 RX ADMIN — INSULIN LISPRO 2 UNITS: 100 INJECTION, SOLUTION INTRAVENOUS; SUBCUTANEOUS at 11:14

## 2022-09-28 RX ADMIN — HYDROXYCHLOROQUINE SULFATE 200 MG: 200 TABLET, FILM COATED ORAL at 08:25

## 2022-09-28 RX ADMIN — INSULIN DETEMIR 17 UNITS: 100 INJECTION, SOLUTION SUBCUTANEOUS at 22:46

## 2022-09-28 RX ADMIN — SUCRALFATE 1 G: 1 TABLET ORAL at 17:08

## 2022-09-28 RX ADMIN — OXYCODONE HYDROCHLORIDE 5 MG: 5 TABLET ORAL at 21:10

## 2022-09-28 RX ADMIN — OXYCODONE HYDROCHLORIDE 5 MG: 5 TABLET ORAL at 13:40

## 2022-09-28 RX ADMIN — ONDANSETRON 4 MG: 2 INJECTION INTRAMUSCULAR; INTRAVENOUS at 08:24

## 2022-09-28 RX ADMIN — HYDROMORPHONE HYDROCHLORIDE 1 MG: 1 INJECTION, SOLUTION INTRAMUSCULAR; INTRAVENOUS; SUBCUTANEOUS at 14:42

## 2022-09-28 RX ADMIN — INSULIN LISPRO 1 UNITS: 100 INJECTION, SOLUTION INTRAVENOUS; SUBCUTANEOUS at 08:25

## 2022-09-28 RX ADMIN — SUCRALFATE 1 G: 1 TABLET ORAL at 08:24

## 2022-09-28 RX ADMIN — DOCUSATE SODIUM 100 MG: 100 CAPSULE, LIQUID FILLED ORAL at 11:14

## 2022-09-28 RX ADMIN — HYDROXYCHLOROQUINE SULFATE 200 MG: 200 TABLET, FILM COATED ORAL at 22:00

## 2022-09-28 RX ADMIN — LEVOTHYROXINE SODIUM 25 MCG: 25 TABLET ORAL at 22:45

## 2022-09-28 RX ADMIN — SIMETHICONE 80 MG: 80 TABLET, CHEWABLE ORAL at 21:10

## 2022-09-28 RX ADMIN — ONDANSETRON 4 MG: 2 INJECTION INTRAMUSCULAR; INTRAVENOUS at 15:33

## 2022-09-28 RX ADMIN — POLYETHYLENE GLYCOL 3350 17 G: 17 POWDER, FOR SOLUTION ORAL at 14:42

## 2022-09-28 RX ADMIN — SIMETHICONE 80 MG: 80 TABLET, CHEWABLE ORAL at 15:29

## 2022-09-28 RX ADMIN — PROMETHAZINE HYDROCHLORIDE 12.5 MG: 25 TABLET ORAL at 21:10

## 2022-09-28 RX ADMIN — DOCUSATE SODIUM 100 MG: 100 CAPSULE, LIQUID FILLED ORAL at 17:07

## 2022-09-28 NOTE — MALNUTRITION/BMI
This medical record reflects one or more clinical indicators suggestive of malnutrition  Malnutrition Findings:   Adult Malnutrition type: Acute illness  Adult Degree of Malnutrition: Malnutrition of moderate degree  Malnutrition Characteristics: Inadequate energy, Weight loss                  360 Statement: Acute/moderate malnutrition r/t nausea/vomiting/abdominal pain reducing PO intake, as evidenced by intake meeting <75% estimated needs > 1 week, and 5 2% wt loss x 1 month (8/31/22: 151#, 9/27/22: 143#)  Treatment: PO diet + nutrition supplements       Body mass index is 26 21 kg/m²  See Nutrition note dated 9/28/2022 for additional details  Completed nutrition assessment is viewable in the nutrition documentation

## 2022-09-28 NOTE — ASSESSMENT & PLAN NOTE
Assessment:  · Longstanding with extensive workup with no definitive cause found  · CT abdomen pelvis with contrast this admission with no acute findings    Plan:  · P r n  pain regimen  · Nausea control with Zofran and phenergan  · Simethicone for bloating

## 2022-09-28 NOTE — PLAN OF CARE
Problem: PAIN - ADULT  Goal: Verbalizes/displays adequate comfort level or baseline comfort level  Description: Interventions:  - Encourage patient to monitor pain and request assistance  - Assess pain using appropriate pain scale  - Administer analgesics based on type and severity of pain and evaluate response  - Implement non-pharmacological measures as appropriate and evaluate response  - Consider cultural and social influences on pain and pain management  - Notify physician/advanced practitioner if interventions unsuccessful or patient reports new pain  Outcome: Progressing     Problem: INFECTION - ADULT  Goal: Absence or prevention of progression during hospitalization  Description: INTERVENTIONS:  - Assess and monitor for signs and symptoms of infection  - Monitor lab/diagnostic results  - Monitor all insertion sites, i e  indwelling lines, tubes, and drains  - Monitor endotracheal if appropriate and nasal secretions for changes in amount and color  - [place] appropriate cooling/warming therapies per order  - Administer medications as ordered  - Instruct and encourage patient and family to use good hand hygiene technique  - Identify and instruct in appropriate isolation precautions for identified infection/condition  Outcome: Progressing  Goal: Absence of fever/infection during neutropenic period  Description: INTERVENTIONS:  - Monitor WBC    Outcome: Progressing     Problem: SAFETY ADULT  Goal: Maintain or return to baseline ADL function  Description: INTERVENTIONS:  -  Assess patient's ability to carry out ADLs; assess patient's baseline for ADL function and identify physical deficits which impact ability to perform ADLs (bathing, care of mouth/teeth, toileting, grooming, dressing, etc )  - Assess/evaluate cause of self-care deficits   - Assess range of motion  - Assess patient's mobility; develop plan if impaired  - Assess patient's need for assistive devices and provide as appropriate  - Encourage maximum independence but intervene and supervise when necessary  - Involve family in performance of ADLs  - Assess for home care needs following discharge   - Consider OT consult to assist with ADL evaluation and planning for discharge  - Provide patient education as appropriate  Outcome: Progressing     Problem: DISCHARGE PLANNING  Goal: Discharge to home or other facility with appropriate resources  Description: INTERVENTIONS:  - Identify barriers to discharge w/patient and caregiver  - Arrange for needed discharge resources and transportation as appropriate  - Identify discharge learning needs (meds, wound care, etc )  - Arrange for interpretive services to assist at discharge as needed  - Refer to Case Management Department for coordinating discharge planning if the patient needs post-hospital services based on physician/advanced practitioner order or complex needs related to functional status, cognitive ability, or social support system  Outcome: Progressing     Problem: Knowledge Deficit  Goal: Patient/family/caregiver demonstrates understanding of disease process, treatment plan, medications, and discharge instructions  Description: Complete learning assessment and assess knowledge base  Interventions:  - Provide teaching at level of understanding  - Provide teaching via preferred learning methods  Outcome: Progressing     Problem: Nutrition/Hydration-ADULT  Goal: Nutrient/Hydration intake appropriate for improving, restoring or maintaining nutritional needs  Description: Monitor and assess patient's nutrition/hydration status for malnutrition  Collaborate with interdisciplinary team and initiate plan and interventions as ordered  Monitor patient's weight and dietary intake as ordered or per policy  Utilize nutrition screening tool and intervene as necessary  Determine patient's food preferences and provide high-protein, high-caloric foods as appropriate       INTERVENTIONS:  - Monitor oral intake, urinary output, labs, and treatment plans  - Assess nutrition and hydration status and recommend course of action  - Evaluate amount of meals eaten  - Assist patient with eating if necessary   - Allow adequate time for meals  - Recommend/ encourage appropriate diets, oral nutritional supplements, and vitamin/mineral supplements  - Order, calculate, and assess calorie counts as needed  - Recommend, monitor, and adjust tube feedings and TPN/PPN based on assessed needs  - Assess need for intravenous fluids  - Provide specific nutrition/hydration education as appropriate  - Include patient/family/caregiver in decisions related to nutrition  Outcome: Progressing

## 2022-09-28 NOTE — ASSESSMENT & PLAN NOTE
Malnutrition Findings:   Adult Malnutrition type: Acute illness  Adult Degree of Malnutrition: Malnutrition of moderate degree  Malnutrition Characteristics: Inadequate energy, Weight loss     360 Statement: Acute/moderate malnutrition r/t nausea/vomiting/abdominal pain reducing PO intake, as evidenced by intake meeting <75% estimated needs > 1 week, and 5 2% wt loss x 1 month (8/31/22: 151#, 9/27/22: 143#)  Treatment: PO diet + nutrition supplements    BMI Findings: Body mass index is 26 21 kg/m²       - Add glucerna nutritional supplements

## 2022-09-28 NOTE — ASSESSMENT & PLAN NOTE
Lab Results   Component Value Date    HGBA1C 7 5 (H) 07/07/2022       Recent Labs     09/27/22  2119 09/28/22  0757 09/28/22  1104 09/28/22  1528   POCGLU 297* 189* 256* 314*       Blood Sugar Average: Last 72 hrs:  (P) 280 [id number]     Assessment and plan:  Type 1 DM  DKA is resolved  Home regimen: tresiba 32 units, humalog insulin to carb ratio of 6, ISF 30  Inpatient: lantus 15, correctional insulin with meals  Start mealtime insulin 3 units with meals  Increase levemir to 17 units at bedtime  If being discharged today would recommend reduced insulin regimen on discharge, continuing basal bolus insulin, outpatient followup with endocrinology   OK to discharge from endocrinology standpoint

## 2022-09-28 NOTE — UTILIZATION REVIEW
Inpatient Admission Authorization Request   NOTIFICATION OF INPATIENT ADMISSION/INPATIENT AUTHORIZATION REQUEST   SERVICING FACILITY:   [address] [place] [address]  Tax ID: [id number]  NPI: [id number]  Place of Service: Inpatient [facility]  [address]  Place of Service Code: 24     ATTENDING PROVIDER:  Attending Name and NPI#: Celio Guardado [[id number]]  Address: [address]  Phone: [phone]     UTILIZATION REVIEW CONTACT:  Maricarmen Medrano Utilization   Network Utilization Review Department  Phone: [phone]  Fax: [phone]  Email: [email or web address]     PHYSICIAN ADVISORY SERVICES:  FOR [id number] REVIEW - MEDICAL NECESSITY DENIAL  Phone: [phone]  Fax: [phone]  Email: [email or web address]     TYPE OF REQUEST:  Inpatient Status     ADMISSION INFORMATION:  ADMISSION DATE/TIME: 9/26/22  8:53 PM  PATIENT DIAGNOSIS CODE/DESCRIPTION:  Abdominal pain [R10 9]  DKA (diabetic ketoacidosis) ([facility] [address] ) [E11 10]  DISCHARGE DATE/TIME: No discharge date for patient encounter  IMPORTANT INFORMATION:  Please contact Maricarmen Medrano directly with any questions or concerns regarding this request  Department voicemails are confidential     Send requests for admission clinical reviews, concurrent reviews, approvals, and administrative denials due to lack of clinical to fax [phone]

## 2022-09-28 NOTE — PROGRESS NOTES
Progress Note - Maty Mirza 24 y o  female MRN: [id number]    Unit/Bed#: W -61 Encounter: [id number]    CC: diabetes f/u    Subjective:   Maty Mirza is a 24y o  year old female with type 1 DM treated for DKA  DKA is resolved  She is currently on basal insulin and correctional insulin  She reports slightly improved appetite since yesterday but has recurrence of abdominal pain today after lunch  She reports eating pancake and quinones with breakfast, sandwich for lunch  Objective:     Vitals: Blood pressure 104/72, pulse 71, temperature 98 7 °F (37 1 °C), temperature source Oral, resp  rate 18, height 5' 2" (1 575 m), weight 65 kg (143 lb 4 8 oz), SpO2 98 %, not currently breastfeeding  ,Body mass index is 26 21 kg/m²  Intake/Output Summary (Last 24 hours) at 9/28/2022 1513  Last data filed at 9/28/2022 1342  Gross per 24 hour   Intake 720 ml   Output --   Net 720 ml       Physical Exam:  General Appearance: awake, appears stated age and cooperative  Head: Normocephalic, without obvious abnormality, atraumatic  Extremities: moves all extremities  Skin: Skin color and temperature normal    Pulm: no labored breathing    Lab, Imaging and other studies: I have personally reviewed pertinent reports  POC Glucose (mg/dl)   Date Value   09/28/2022 256 (H)   09/28/2022 189 (H)   09/27/2022 297 (H)   09/27/2022 216 (H)   09/27/2022 155 (H)   09/27/2022 77   09/27/2022 94   09/27/2022 86   09/27/2022 70   09/27/2022 43 (L)     Assessment and plan:  Type 1 DM  DKA is resolved  Home regimen: tresiba 32 units, humalog insulin to carb ratio of 6, ISF 30  Inpatient: lantus 15, correctional insulin with meals  Start mealtime insulin 3 units with meals  Increase levemir to 17 units at bedtime  If being discharged today would recommend reduced insulin regimen on discharge, continuing basal bolus insulin, outpatient followup with endocrinology   OK to discharge from endocrinology standpoint    Hypothyroidism  On 25mcg levothyroxine daily, TSH 3 312 on this admission      Portions of the record may have been created with voice recognition software

## 2022-09-28 NOTE — ASSESSMENT & PLAN NOTE
Patient states that she is being treated for PCOS by OBGYN    Plan:  · Continue home OCP regimen  · Holding home metformin

## 2022-09-28 NOTE — ASSESSMENT & PLAN NOTE
Recent Labs     09/27/22  1634 09/27/22  2119 09/28/22  0757 09/28/22  1104   POCGLU 216* 297* 189* 256*     POA:  · Increased anion gap acidosis, positive beta hydroxybutyrate, urine glucose/ketones  · Initial sugar 272    Home regimen:  · Insulin pump  · Tresiba  · Metformin (also for PCOS)    Assessment:  · Patient reports good glucose control other than day of admission  · Most recent A1c 7 5  · Anion gap closed in ED after IV dextrose/insulin  · Insulin drip stopped  · Patient transition to basal-bolus    Plan:  Blood Sugar Average: Last 72 hrs:  (P) 139 5    DKA resolved  Endocrinology consulted  Recommendations:  -change to Lantus 15 units at bedtime  -continue sliding scale, Humalog with meals, and custom sliding scale at bedtime, starting at 300 to avoid hypoglycemia at night  -once patient's appetite improves and no nausea vomiting will start fixed dose of Humalog with meals,  -would recommend discharge patient home on basal bolus insulin regimen  -continue to monitor blood sugars with meals and at bedtime, will recommend further changes as necessary  Reach out to endocrinology again concerning insulin and whether patient is safe for discharge today

## 2022-09-28 NOTE — DISCHARGE INSTR - AVS FIRST PAGE
Dear Maty Mirza,     It was our pleasure to care for you here at [facility]  It is our hope that we were always able to exceed the expected standards for your care during your stay  You were hospitalized due to diabetic ketoacidosis  You were cared for on the med surg floor by Margo George under the service of Bernard Lan MD with the [facility] who covers for your primary care physician (PCP), MARGARET Zheng, while you were hospitalized  If you have any questions or concerns related to this hospitalization, you may contact us at [phone]  For follow up as well as any medication refills, we recommend that you follow up with your primary care physician  A registered nurse will reach out to you by phone within a few days after your discharge to answer any additional questions that you may have after going home  However, at this time we provide for you here, the most important instructions / recommendations at discharge:     Notable Medication Adjustments -   Take Levemir 17 units at bedtime  Take Humalog 3x a day with meals with an insulin to carb ratio of 10  ISF 30 for target of 120  Testing Required after Discharge -   none  Important follow up information -   PCP IN 1 WEEK   Follow up with Endocrinology outpatient  Follow up with Rheumatology outpatient  Other Instructions -   Follow up with insulin pump  Please review this entire after visit summary as additional general instructions including medication list, appointments, activity, diet, any pertinent wound care, and other additional recommendations from your care team that may be provided for you        Sincerely,     Margo George

## 2022-09-28 NOTE — PROGRESS NOTES
[facility]  Progress Note - Doris Dick 1995, 24 y o  female MRN: [id number]  Unit/Bed#: W -01 Encounter: [id number]  Primary Care Provider: MARGARET Denton   Date and time admitted to hospital: 9/26/2022  4:20 PM    Moderate protein-calorie malnutrition ([address] )  Assessment & Plan  Malnutrition Findings:   Adult Malnutrition type: Acute illness  Adult Degree of Malnutrition: Malnutrition of moderate degree  Malnutrition Characteristics: Inadequate energy, Weight loss     360 Statement: Acute/moderate malnutrition r/t nausea/vomiting/abdominal pain reducing PO intake, as evidenced by intake meeting <75% estimated needs > 1 week, and 5 2% wt loss x 1 month (8/31/22: 151#, 9/27/22: 143#)  Treatment: PO diet + nutrition supplements    BMI Findings: Body mass index is 26 21 kg/m²  - Add glucerna nutritional supplements    Type 1 diabetes mellitus [facility])  Assessment & Plan  Lab Results   Component Value Date    HGBA1C 7 5 (H) 07/07/2022       Recent Labs     09/27/22  2119 09/28/22  0757 09/28/22  1104 09/28/22  1528   POCGLU 297* 189* 256* 314*       Blood Sugar Average: Last 72 hrs:  (P) 166 [id number]     Assessment and plan:  Type 1 DM  DKA is resolved  Home regimen: tresiba 32 units, humalog insulin to carb ratio of 6, ISF 30  Inpatient: lantus 15, correctional insulin with meals  Start mealtime insulin 3 units with meals  Increase levemir to 17 units at bedtime  If being discharged today would recommend reduced insulin regimen on discharge, continuing basal bolus insulin, outpatient followup with endocrinology   OK to discharge from endocrinology standpoint       Amenorrhea  Assessment & Plan  Patient states that she is being treated for PCOS by OBGYN    Plan:  · Continue home OCP regimen  · Holding home metformin    Hashimoto's disease  Assessment & Plan  Assessment:   - TSH: 0 705 (on 9/26)  TSH 3RD GENERATON w/ free T4 reflex 0 450 - 4 500 uIU/mL 3 312 Plan:  - continue current dose of levothyroxine  Right sided abdominal pain  Assessment & Plan  Assessment:  · Longstanding with extensive workup with no definitive cause found  · CT abdomen pelvis with contrast this admission with no acute findings    Plan:  · P r n  pain regimen  · Nausea control with Zofran and phenergan  · Simethicone for bloating  · Consider rheum referral for outpatient follow-up, abdominal pain may be autoimmune-related    * DKA, type 1 (HCC)-resolved as of 9/28/2022  Assessment & Plan    Recent Labs     09/27/22  1634 09/27/22  2119 09/28/22  0757 09/28/22  1104   POCGLU 216* 297* 189* 256*     POA:  · Increased anion gap acidosis, positive beta hydroxybutyrate, urine glucose/ketones  · Initial sugar 272    Home regimen:  · Insulin pump  · Umang Homme  · Metformin (also for PCOS)    Assessment:  · Patient reports good glucose control other than day of admission  · Most recent A1c 7 5  · Anion gap closed in ED after IV dextrose/insulin  · Insulin drip stopped  · Patient transition to basal-bolus    Plan:  Blood Sugar Average: Last 72 hrs:  (P) 139 5    DKA resolved  VTE Pharmacologic Prophylaxis: VTE Score: 2 Low Risk (Score 0-2) - Encourage Ambulation  Patient Centered Rounds: I performed bedside rounds with nursing staff today  Discussions with Specialists or Other Care Team Provider: Endocrinology    Education and Discussions with Family / Patient: Attempted to update  (significant other) via phone  Left voicemail  Current Length of Stay: 2 day(s)  Current Patient Status: Inpatient   Discharge Plan: Anticipate discharge tomorrow to home  Code Status: Level 1 - Full Code    Subjective:   Pt complains of constipation, saying she hasn't had a bowel movement since Sunday  Pt continues to report some nausea but denies any recent vomiting  Pt continues to report right and mid lower quadrant abdominal, worse with forward flexion and palpation   Pt reports increased appetite/food intake and fluid intake though with continued nausea while eating  Pt also reports some continued fatigue and mild SOB upon standing  Pt denies any recent subjective fever or chills  Pt denies any sharp chest pain today  Objective:     Vitals:   Temp (24hrs), Av 4 °F (36 9 °C), Min:98 1 °F (36 7 °C), Max:98 7 °F (37 1 °C)    Temp:  [98 1 °F (36 7 °C)-98 7 °F (37 1 °C)] 98 1 °F (36 7 °C)  HR:  [71-83] 83  Resp:  [16-18] 16  BP: ()/(56-72) 102/56  SpO2:  [95 %-98 %] 95 %  Body mass index is 26 21 kg/m²  Input and Output Summary (last 24 hours): Intake/Output Summary (Last 24 hours) at 2022 1717  Last data filed at 2022 1342  Gross per 24 hour   Intake 720 ml   Output --   Net 720 ml       Physical Exam:   Physical Exam  Cardiovascular:      Rate and Rhythm: Normal rate and regular rhythm  Heart sounds: Normal heart sounds  Pulmonary:      Effort: Pulmonary effort is normal       Breath sounds: Normal breath sounds and air entry  Abdominal:      General: Bowel sounds are absent  Palpations: Abdomen is soft  Tenderness: There is abdominal tenderness in the right lower quadrant and suprapubic area           Additional Data:     Labs:  Results from last 7 days   Lab Units 22  0426 22  1700   WBC Thousand/uL 6 41 7 29   HEMOGLOBIN g/dL 12 4 13 9   HEMATOCRIT % 37 1 42 5   PLATELETS Thousands/uL 170 180   NEUTROS PCT %  --  65   LYMPHS PCT %  --  28   MONOS PCT %  --  6   EOS PCT %  --  0     Results from last 7 days   Lab Units 22  0426 22  1939 22  1700   SODIUM mmol/L 135   < > 134*   POTASSIUM mmol/L 3 7   < > 4 2   CHLORIDE mmol/L 104   < > 101   CO2 mmol/L 22   < > 14*   BUN mg/dL 8   < > 13   CREATININE mg/dL 0 74   < > 0 87   ANION GAP mmol/L 9   < > 19*   CALCIUM mg/dL 8 7   < > 9 6   ALBUMIN g/dL  --   --  4 8   TOTAL BILIRUBIN mg/dL  --   --  2 09*   ALK PHOS U/L  --   --  54   ALT U/L  --   --  11   AST U/L  -- --  13   GLUCOSE RANDOM mg/dL 212*   < > 267*    < > = values in this interval not displayed           Results from last 7 days   Lab Units 09/28/22  1528 09/28/22  1104 09/28/22  0757 09/27/22  2119 09/27/22  1634 09/27/22  1031 09/27/22  0743 09/27/22  0631 09/27/22  0443 09/27/22  0253 09/27/22  0225 09/26/22  2326   POC GLUCOSE mg/dl 314* 256* 189* 297* 216* 155* 77 94 86 70 43* 196*         Results from last 7 days   Lab Units 09/26/22  1817   LACTIC ACID mmol/L 1 5       Lines/Drains:  Invasive Devices  Report    Peripheral Intravenous Line  Duration           Peripheral IV 09/26/22 Right Antecubital 2 days    Peripheral IV 09/26/22 Left Antecubital 1 day                      Imaging: Reviewed radiology reports from this admission including: abdominal/pelvic CT    Recent Cultures (last 7 days):         Last 24 Hours Medication List:   Current Facility-Administered Medications   Medication Dose Route Frequency Provider Last Rate    acetaminophen  650 mg Oral Q4H PRN Nils Clark MD      clonazePAM  0 5 mg Oral BID PRN Nils Clark MD      cyanocobalamin  1,000 mcg Oral Daily Nils Clark MD      docusate sodium  100 mg Oral BID Marquita Dorsey MD      folic acid  1 mg Oral Daily Nils Clark MD      gabapentin  300 mg Oral PRN Jhon Clark MD      HYDROmorphone  1 mg Intravenous Q3H PRN Jhon Clark MD      hydroxychloroquine  200 mg Oral BID With Meals Nils Clark MD      insulin detemir  17 Units Subcutaneous HS Amelia Gustafson, DO      insulin lispro  1-5 Units Subcutaneous TID JOE Aguilar MD      insulin lispro  1-5 Units Subcutaneous HS Adryan Agrawal MD      insulin lispro  3 Units Subcutaneous TID With Meals Carmelina Patrick, DO      levonorgestrel-ethinyl estradiol  1 tablet Oral Daily Nils Clark MD      levothyroxine  25 mcg Oral Early Morning Nils Gayle Clark MD      methotrexate  15 mg Oral Weekly Nils Clark MD      ondansetron  4 mg Intravenous Q6H PRN Jhon Clark MD      oxyCODONE  10 mg Oral Q4H PRN Jhon Clark MD      oxyCODONE  5 mg Oral Q4H PRN Jhon Clark MD      pantoprazole  40 mg Oral Daily Nils Clark MD      polyethylene glycol  17 g Oral Daily PRN Marquita Dorsey MD      promethazine  12 5 mg Oral Q6H PRN Nils Clark MD      simethicone  80 mg Oral Q6H PRN Marquita Dorsey MD      sodium chloride (PF)  3 mL Intravenous Q1H PRN Jhon Clark MD      sucralfate  1 g Oral BID Nils Clark MD          Today, Patient Was Seen By: Marquita Dorsey    **Please Note: This note may have been constructed using a voice recognition system  **

## 2022-09-28 NOTE — PLAN OF CARE
Problem: PAIN - ADULT  Goal: Verbalizes/displays adequate comfort level or baseline comfort level  Description: Interventions:  - Encourage patient to monitor pain and request assistance  - Assess pain using appropriate pain scale  - Administer analgesics based on type and severity of pain and evaluate response  - Implement non-pharmacological measures as appropriate and evaluate response  - Consider cultural and social influences on pain and pain management  - Notify physician/advanced practitioner if interventions unsuccessful or patient reports new pain  Outcome: Progressing     Problem: INFECTION - ADULT  Goal: Absence or prevention of progression during hospitalization  Description: INTERVENTIONS:  - Assess and monitor for signs and symptoms of infection  - Monitor lab/diagnostic results  - Monitor all insertion sites, i e  indwelling lines, tubes, and drains  - Monitor endotracheal if appropriate and nasal secretions for changes in amount and color  - [place] appropriate cooling/warming therapies per order  - Administer medications as ordered  - Instruct and encourage patient and family to use good hand hygiene technique  - Identify and instruct in appropriate isolation precautions for identified infection/condition  Outcome: Progressing     Problem: DISCHARGE PLANNING  Goal: Discharge to home or other facility with appropriate resources  Description: INTERVENTIONS:  - Identify barriers to discharge w/patient and caregiver  - Arrange for needed discharge resources and transportation as appropriate  - Identify discharge learning needs (meds, wound care, etc )  - Arrange for interpretive services to assist at discharge as needed  - Refer to Case Management Department for coordinating discharge planning if the patient needs post-hospital services based on physician/advanced practitioner order or complex needs related to functional status, cognitive ability, or social support system  Outcome: Progressing Problem: Knowledge Deficit  Goal: Patient/family/caregiver demonstrates understanding of disease process, treatment plan, medications, and discharge instructions  Description: Complete learning assessment and assess knowledge base  Interventions:  - Provide teaching at level of understanding  - Provide teaching via preferred learning methods  Outcome: Progressing     Problem: Nutrition/Hydration-ADULT  Goal: Nutrient/Hydration intake appropriate for improving, restoring or maintaining nutritional needs  Description: Monitor and assess patient's nutrition/hydration status for malnutrition  Collaborate with interdisciplinary team and initiate plan and interventions as ordered  Monitor patient's weight and dietary intake as ordered or per policy  Utilize nutrition screening tool and intervene as necessary  Determine patient's food preferences and provide high-protein, high-caloric foods as appropriate       INTERVENTIONS:  - Monitor oral intake, urinary output, labs, and treatment plans  - Assess nutrition and hydration status and recommend course of action  - Evaluate amount of meals eaten  - Assist patient with eating if necessary   - Allow adequate time for meals  - Recommend/ encourage appropriate diets, oral nutritional supplements, and vitamin/mineral supplements  - Order, calculate, and assess calorie counts as needed  - Recommend, monitor, and adjust tube feedings and TPN/PPN based on assessed needs  - Assess need for intravenous fluids  - Provide specific nutrition/hydration education as appropriate  - Include patient/family/caregiver in decisions related to nutrition  Outcome: Progressing

## 2022-09-28 NOTE — ASSESSMENT & PLAN NOTE
Assessment:   - TSH: 0 705 (on 9/26)  TSH 3RD GENERATON w/ free T4 reflex 0 450 - 4 500 uIU/mL 3 312        Plan:  - continue current dose of levothyroxine

## 2022-09-29 VITALS
SYSTOLIC BLOOD PRESSURE: 96 MMHG | HEART RATE: 81 BPM | DIASTOLIC BLOOD PRESSURE: 60 MMHG | OXYGEN SATURATION: 97 % | BODY MASS INDEX: 26.37 KG/M2 | WEIGHT: 143.3 LBS | HEIGHT: 62 IN | TEMPERATURE: 98.3 F | RESPIRATION RATE: 16 BRPM

## 2022-09-29 PROBLEM — E10.10 DKA, TYPE 1 (HCC): Status: RESOLVED | Noted: 2021-05-11 | Resolved: 2022-09-29

## 2022-09-29 LAB
ANION GAP SERPL CALCULATED.3IONS-SCNC: 11 MMOL/L (ref 4–13)
BUN SERPL-MCNC: 14 MG/DL (ref 5–25)
CALCIUM SERPL-MCNC: 8.8 MG/DL (ref 8.4–10.2)
CHLORIDE SERPL-SCNC: 103 MMOL/L (ref 96–108)
CO2 SERPL-SCNC: 22 MMOL/L (ref 21–32)
CREAT SERPL-MCNC: 0.76 MG/DL (ref 0.6–1.3)
ERYTHROCYTE [DISTWIDTH] IN BLOOD BY AUTOMATED COUNT: 13.4 % (ref 11.6–15.1)
GFR SERPL CREATININE-BSD FRML MDRD: 109 ML/MIN/1.73SQ M
GLUCOSE SERPL-MCNC: 170 MG/DL (ref 65–140)
GLUCOSE SERPL-MCNC: 217 MG/DL (ref 65–140)
GLUCOSE SERPL-MCNC: 81 MG/DL (ref 65–140)
HCT VFR BLD AUTO: 38 % (ref 34.8–46.1)
HGB BLD-MCNC: 12.6 G/DL (ref 11.5–15.4)
MCH RBC QN AUTO: 29.5 PG (ref 26.8–34.3)
MCHC RBC AUTO-ENTMCNC: 33.2 G/DL (ref 31.4–37.4)
MCV RBC AUTO: 89 FL (ref 82–98)
PLATELET # BLD AUTO: 164 THOUSANDS/UL (ref 149–390)
PMV BLD AUTO: 12.7 FL (ref 8.9–12.7)
POTASSIUM SERPL-SCNC: 3.7 MMOL/L (ref 3.5–5.3)
RBC # BLD AUTO: 4.27 MILLION/UL (ref 3.81–5.12)
SODIUM SERPL-SCNC: 136 MMOL/L (ref 135–147)
WBC # BLD AUTO: 5.58 THOUSAND/UL (ref 4.31–10.16)

## 2022-09-29 PROCEDURE — 99239 HOSP IP/OBS DSCHRG MGMT >30: CPT | Performed by: INTERNAL MEDICINE

## 2022-09-29 PROCEDURE — 82948 REAGENT STRIP/BLOOD GLUCOSE: CPT

## 2022-09-29 PROCEDURE — 99232 SBSQ HOSP IP/OBS MODERATE 35: CPT | Performed by: INTERNAL MEDICINE

## 2022-09-29 PROCEDURE — 85027 COMPLETE CBC AUTOMATED: CPT

## 2022-09-29 PROCEDURE — 80048 BASIC METABOLIC PNL TOTAL CA: CPT

## 2022-09-29 RX ORDER — INSULIN LISPRO 100 [IU]/ML
1-5 INJECTION, SOLUTION INTRAVENOUS; SUBCUTANEOUS
Refills: 0
Start: 2022-09-29 | End: 2022-09-29 | Stop reason: SDUPTHER

## 2022-09-29 RX ORDER — SIMETHICONE 80 MG
80 TABLET,CHEWABLE ORAL EVERY 6 HOURS PRN
Qty: 30 TABLET | Refills: 0 | Status: SHIPPED | OUTPATIENT
Start: 2022-09-29

## 2022-09-29 RX ORDER — INSULIN LISPRO 100 [IU]/ML
1-5 INJECTION, SOLUTION INTRAVENOUS; SUBCUTANEOUS
Refills: 0 | Status: CANCELLED | OUTPATIENT
Start: 2022-09-29

## 2022-09-29 RX ORDER — INSULIN LISPRO 100 [IU]/ML
1-5 INJECTION, SOLUTION INTRAVENOUS; SUBCUTANEOUS
Refills: 0
Start: 2022-09-29 | End: 2022-10-19 | Stop reason: ALTCHOICE

## 2022-09-29 RX ORDER — POLYETHYLENE GLYCOL 3350 17 G/17G
17 POWDER, FOR SOLUTION ORAL DAILY PRN
Qty: 510 G | Refills: 0 | Status: SHIPPED | OUTPATIENT
Start: 2022-09-29

## 2022-09-29 RX ADMIN — PANTOPRAZOLE SODIUM 40 MG: 40 TABLET, DELAYED RELEASE ORAL at 05:22

## 2022-09-29 RX ADMIN — SUCRALFATE 1 G: 1 TABLET ORAL at 09:05

## 2022-09-29 RX ADMIN — INSULIN LISPRO 3 UNITS: 100 INJECTION, SOLUTION INTRAVENOUS; SUBCUTANEOUS at 10:29

## 2022-09-29 RX ADMIN — HYDROXYCHLOROQUINE SULFATE 200 MG: 200 TABLET, FILM COATED ORAL at 09:06

## 2022-09-29 RX ADMIN — DOCUSATE SODIUM 100 MG: 100 CAPSULE, LIQUID FILLED ORAL at 09:05

## 2022-09-29 RX ADMIN — ONDANSETRON 4 MG: 2 INJECTION INTRAMUSCULAR; INTRAVENOUS at 09:05

## 2022-09-29 RX ADMIN — INSULIN LISPRO 1 UNITS: 100 INJECTION, SOLUTION INTRAVENOUS; SUBCUTANEOUS at 10:29

## 2022-09-29 NOTE — ASSESSMENT & PLAN NOTE
Assessment:  · Longstanding with extensive workup with no definitive cause found  · CT abdomen pelvis with contrast this admission with no acute findings     Plan:  · P r n  pain regimen  · Nausea control with Zofran and phenergan  · Simethicone for bloating  · Consider rheum referral for outpatient follow-up, abdominal pain may be autoimmune-related  · bloating

## 2022-09-29 NOTE — PLAN OF CARE
Problem: PAIN - ADULT  Goal: Verbalizes/displays adequate comfort level or baseline comfort level  Description: Interventions:  - Encourage patient to monitor pain and request assistance  - Assess pain using appropriate pain scale  - Administer analgesics based on type and severity of pain and evaluate response  - Implement non-pharmacological measures as appropriate and evaluate response  - Consider cultural and social influences on pain and pain management  - Notify physician/advanced practitioner if interventions unsuccessful or patient reports new pain  Outcome: Progressing     Problem: INFECTION - ADULT  Goal: Absence of fever/infection during neutropenic period  Description: INTERVENTIONS:  - Monitor WBC    Outcome: Progressing     Problem: Knowledge Deficit  Goal: Patient/family/caregiver demonstrates understanding of disease process, treatment plan, medications, and discharge instructions  Description: Complete learning assessment and assess knowledge base  Interventions:  - Provide teaching at level of understanding  - Provide teaching via preferred learning methods  Outcome: Progressing     Problem: Nutrition/Hydration-ADULT  Goal: Nutrient/Hydration intake appropriate for improving, restoring or maintaining nutritional needs  Description: Monitor and assess patient's nutrition/hydration status for malnutrition  Collaborate with interdisciplinary team and initiate plan and interventions as ordered  Monitor patient's weight and dietary intake as ordered or per policy  Utilize nutrition screening tool and intervene as necessary  Determine patient's food preferences and provide high-protein, high-caloric foods as appropriate       INTERVENTIONS:  - Monitor oral intake, urinary output, labs, and treatment plans  - Assess nutrition and hydration status and recommend course of action  - Evaluate amount of meals eaten  - Assist patient with eating if necessary   - Allow adequate time for meals  - Recommend/ encourage appropriate diets, oral nutritional supplements, and vitamin/mineral supplements  - Order, calculate, and assess calorie counts as needed  - Recommend, monitor, and adjust tube feedings and TPN/PPN based on assessed needs  - Assess need for intravenous fluids  - Provide specific nutrition/hydration education as appropriate  - Include patient/family/caregiver in decisions related to nutrition  Outcome: Progressing

## 2022-09-29 NOTE — PROGRESS NOTES
Progress Note - Jitendra Dubois 24 y o  female MRN: [id number]    Unit/Bed#: W -01 Encounter: [id number]      CC:  Type 1 diabetes Diabetes f/u    Subjective:   Jitendra Dubois is a 24y o  year old female with type 1 diabetes patient is doing fine  No more vomiting episodes  Still present with right upper quadrant abdominal pain  Diet is doing better  Unfortunately patient still has nausea and slightly poor appetite  Will be worked up by GI for cholecystitis as outpatient  Continue the patient's she is scheduled to do a HIDA scan  No other subjective complaints noted  Advised patient to follow-up with endocrinology as outpatient  Objective:     Vitals: Blood pressure 96/60, pulse 81, temperature 98 3 °F (36 8 °C), resp  rate 16, height 5' 2" (1 575 m), weight 65 kg (143 lb 4 8 oz), SpO2 97 %, not currently breastfeeding  ,Body mass index is 26 21 kg/m²  Recent Labs     09/28/22  0757 09/28/22  1104 09/28/22  1528 09/28/22  1928 09/28/22  2247 09/29/22  0721 09/29/22  1151   POCGLU 189* 256* 314* 238* 240* 170* 81       Physical Exam:  General Appearance: awake, appears stated age and cooperative  Head: Normocephalic, without obvious abnormality, atraumatic  Extremities: moves all extremities  Skin: Skin color and temperature normal    Pulm: no labored breathing    Lab, Imaging and other studies: I have personally reviewed pertinent reports  Assessment: This is a 24y o -year-old female with diabetes with hyperglycemia  Patient has type 1 diabetes diagnosed in 2018  Greater than 10 episodes of DKA according to patient  HBA1c 7 1 (1/3) and 7 5 (7/7)      Plan:  May go home from endocrinology perspective  Continue 17 units tresiba once a day  Continue carb counting  Insulin to carb ratio 10 ( 1 unit of novolog for every 10 g carb intake)  ISF for target of 120 (For every 30 glucose above goal of 120, take 1 unit of novolog)   Please make appointment and followup with Endocrinology as outptient  Portions of the record may have been created with voice recognition software

## 2022-09-29 NOTE — ASSESSMENT & PLAN NOTE
Lab Results   Component Value Date    HGBA1C 7 5 (H) 07/07/2022       Recent Labs     09/28/22  1928 09/28/22  2247 09/29/22  0721 09/29/22  1151   POCGLU 238* 240* 170* 81       Blood Sugar Average: Last 72 hrs:  (P) 171 [id number]     Assessment and plan:  Type 1 DM  DKA is resolved  May go home from endocrinology perspective  Continue 17 units tresiba once a day  Continue carb counting  Insulin to carb ratio 10 ( 1 unit of novolog for every 10 g carb intake)  ISF for target of 120 (For every 30 glucose above goal of 120, take 1 unit of novolog)   Please make appointment and followup with Endocrinology as outptient

## 2022-09-29 NOTE — ASSESSMENT & PLAN NOTE
Recent Labs     09/28/22  1928 09/28/22  2247 09/29/22  0721 09/29/22  1151   POCGLU 238* 240* 170* 81     POA:  · Increased anion gap acidosis, positive beta hydroxybutyrate, urine glucose/ketones  · Initial sugar 272    Home regimen:  · Insulin pump  · Tresiba  · Metformin (also for PCOS)    Assessment:  · Patient reports good glucose control other than day of admission  · Most recent A1c 7 5  · Anion gap closed in ED after IV dextrose/insulin  · Insulin drip stopped  · Patient transition to basal-bolus    Plan:  Blood Sugar Average: Last 72 hrs:  (P) 139 5    DKA resolved  Endocrinology consulted

## 2022-09-29 NOTE — ASSESSMENT & PLAN NOTE
Lab Results   Component Value Date    HGBA1C 7 5 (H) 07/07/2022       Recent Labs     09/28/22  1104 09/28/22  1528 09/28/22  1928 09/28/22  2247   POCGLU 256* 314* 238* 240*       Blood Sugar Average: Last 72 hrs:  (P) 088 [id number]     Assessment and plan:  Type 1 DM  DKA is resolved  Home regimen: tresiba 32 units, humalog insulin to carb ratio of 6, ISF 30  Inpatient: lantus 15, correctional insulin with meals  Start mealtime insulin 3 units with meals  Increase levemir to 17 units at bedtime    If being discharged today would recommend reduced insulin regimen on discharge, continuing basal bolus insulin, outpatient followup with endocrinology  OK to discharge from endocrinology standpoint

## 2022-09-29 NOTE — ASSESSMENT & PLAN NOTE
Right sided abdominal pain  Assessment & Plan  Assessment:  · Longstanding with extensive workup with no definitive cause found  · CT abdomen pelvis with contrast this admission with no acute findings     Plan:  · P r n  pain regimen  · Nausea control with Zofran and phenergan  · Simethicone for bloating  · Consider rheum referral for outpatient follow-up, abdominal pain may be autoimmune-related

## 2022-09-29 NOTE — DISCHARGE SUMMARY
[facility]  Discharge- Jitendra Grahamer 1995, 24 y o  female MRN: [id number]  Unit/Bed#: W -01 Encounter: [id number]  Primary Care Provider: MARGARET Carrasco   Date and time admitted to hospital: 9/26/2022  4:20 PM    Moderate protein-calorie malnutrition ([address] )  Assessment & Plan  Malnutrition Findings:   Adult Malnutrition type: Acute illness  Adult Degree of Malnutrition: Malnutrition of moderate degree  Malnutrition Characteristics: Inadequate energy, Weight loss     360 Statement: Acute/moderate malnutrition r/t nausea/vomiting/abdominal pain reducing PO intake, as evidenced by intake meeting <75% estimated needs > 1 week, and 5 2% wt loss x 1 month (8/31/22: 151#, 9/27/22: 143#)  Treatment: PO diet + nutrition supplements    BMI Findings: Body mass index is 26 21 kg/m²  - Add glucerna nutritional supplements    Constipation  Assessment & Plan  Assessment:    Pt reports no bowel movement since Sunday      Plan:  -Colace  -Miralax    Type 1 diabetes mellitus [facility])  Assessment & Plan  Lab Results   Component Value Date    HGBA1C 7 5 (H) 07/07/2022       Recent Labs     09/28/22  1928 09/28/22  2247 09/29/22  0721 09/29/22  1151   POCGLU 238* 240* 170* 81       Blood Sugar Average: Last 72 hrs:  (P) 171 [id number]     Assessment and plan:  Type 1 DM  DKA is resolved  May go home from endocrinology perspective  Continue 17 units tresiba once a day  Continue carb counting  Insulin to carb ratio 10 ( 1 unit of novolog for every 10 g carb intake)  ISF for target of 120 (For every 30 glucose above goal of 120, take 1 unit of novolog)   Please make appointment and followup with Endocrinology as outptient       Amenorrhea  Assessment & Plan  Patient states that she is being treated for PCOS by OBGYN    Plan:  · Continue home OCP regimen  · Holding home metformin    Right sided abdominal pain  Assessment & Plan  Assessment:  · Longstanding with extensive workup with no definitive cause found  · CT abdomen pelvis with contrast this admission with no acute findings     Plan:  · P r n  pain regimen  · Nausea control with Zofran and phenergan  · Simethicone for bloating  · Consider rheum referral for outpatient follow-up, abdominal pain may be autoimmune-related  · bloating    * DKA, type 1 ([facility])-resolved as of 9/29/2022  Assessment & Plan    Recent Labs     09/28/22  1928 09/28/22  2247 09/29/22  0721 09/29/22  1151   POCGLU 238* 240* 170* 81     POA:  · Increased anion gap acidosis, positive beta hydroxybutyrate, urine glucose/ketones  · Initial sugar 272    Home regimen:  · Insulin pump  · Tresiba  · Metformin (also for PCOS)    Assessment:  · Patient reports good glucose control other than day of admission  · Most recent A1c 7 5  · Anion gap closed in ED after IV dextrose/insulin  · Insulin drip stopped  · Patient transition to basal-bolus    Plan:  Blood Sugar Average: Last 72 hrs:  (P) 139 5    DKA resolved  Endocrinology consulted  Medical Problems             Resolved Problems  Date Reviewed: 9/29/2022          Resolved    * (Principal) DKA, type 1 ([facility] [address] ) 9/29/2022     Resolved by  Christian Gillette MD              Discharging Resident: Christian Gillette  Discharging Attending: Flor Borges MD  PCP: Eddie Pillai [facility]   Admission Date:   Admission Orders (From admission, onward)     Ordered        09/26/22 2053  INPATIENT ADMISSION  Once                      Discharge Date: 09/29/22    Consultations During Hospital Stay:  · Endocrinology    Procedures Performed:   · No    Significant Findings / Test Results:   · High anion gap metabolic acidosis    Incidental Findings:   · None     Test Results Pending at Discharge (will require follow up):    · None     Outpatient Tests Requested:  · None    Complications:  None    Reason for Admission: DKA    Hospital Course:   Sharonda Forman is a 24 y o  female with past medical history of type 1 diabetes (diagnosed 3 years ago) on insulin pump, bipolar disorder, PTSD, seizure disorder, undifferentiated connective tissue disorder, Hashimoto's thyroiditis who presented with abdominal pain, nausea, vomiting, poor appetite and found to be in DKA  Pt's DKA occurred after pt switched to basal bolus insulin regimen two days prior to hospitalization because of bruising from insulin pump infusion set  Pt had an increased anion gap acidosis, positive beta hydroxybutyrate, urine glucose/ketones and an initial sugar of 272  She was treated with IV dextrose and insulin in the ED and the anion gap closed  Pt was transferred to [facility] surg floor for further monitoring  Pt was transitioned to basal-bolus and briefly developed hypoglycemia after being given 15 units of Levemir  Endocrinology was consulted and adjusted her insulin regimen  Pt's insulin regimen was changed to Levimer 17 units at bedtime, sliding scale with meal includes ICR of 10, ISF 30 for target of 120  Will follow up Endocrinology outpatient for DM1  Pt also continued to experience right sided abdominal pain, consistent with longstanding right sided abdominal pain, along with constipation, bloating, nausea and vomiting and was given Zofran, miralax, colace, simethicone and prn pain regimen for her symptoms  Pt's lipase was 9 u/L on 9/26  CT ab/pelvis w/ contrast showed normal appendix and no evidence of bowel obstruction, colitis or diverticulitis  Pt will be worked up by GI for cholecystitis as outpatient  Will follow up outpatient with PCP and Rheumatology for other conditions  Please see above list of diagnoses and related plan for additional information  Condition at Discharge: fair    Discharge Day Visit / Exam:   * Please refer to separate progress note for these details *    Discussion with Family: Attempted to update  (significant other) via phone  Left voicemail       Discharge instructions/Information to patient and family:   See after visit summary for information provided to patient and family  Provisions for Follow-Up Care:  See after visit summary for information related to follow-up care and any pertinent home health orders  Disposition:   Home    Planned Readmission: None    Discharge Medications:  See after visit summary for reconciled discharge medications provided to patient and/or family        **Please Note: This note may have been constructed using a voice recognition system**

## 2022-09-30 ENCOUNTER — TRANSITIONAL CARE MANAGEMENT (OUTPATIENT)
Dept: FAMILY MEDICINE CLINIC | Facility: CLINIC | Age: 27
End: 2022-09-30

## 2022-10-05 NOTE — UTILIZATION REVIEW
Notification of Discharge   This is a Notification of Discharge from our facility [address]  Please be advised that this patient has been discharge from our facility  Below you will find the admission and discharge date and time including the patients disposition  UTILIZATION REVIEW CONTACT:  Lore Otero  Utilization   Network Utilization Review Department  Phone: [phone] x carefully listen to the prompts  All voicemails are confidential   Email: [email or web address]     PHYSICIAN ADVISORY SERVICES:  FOR [id number] REVIEW - MEDICAL NECESSITY DENIAL  Phone: [phone]  Fax: [phone]  Email: [email or web address]     PRESENTATION DATE: 9/26/2022  4:20 PM  OBERVATION ADMISSION DATE:   INPATIENT ADMISSION DATE: 9/26/22  8:53 PM   DISCHARGE DATE: 9/29/2022  3:04 PM  DISPOSITION: Home/Self Care Home/Self Care      IMPORTANT INFORMATION:  Send all requests for admission clinical reviews, approved or denied determinations and any other requests to dedicated fax number below belonging to the campus where the patient is receiving treatment   List of dedicated fax numbers:  [address] DENIALS (Administrative/Medical Necessity) [phone]   [address] (Maternity/NICU/Pediatrics) [phone]   [facility] [phone]   130 [facility] [phone]   [address] [place] [address] [phone]   2000 [facility] [address] [place] [address],[address] [phone]   [facility]  [phone]   [address] [place] [address], [address]  [address] [phone]

## 2022-10-11 ENCOUNTER — VBI (OUTPATIENT)
Dept: ADMINISTRATIVE | Facility: OTHER | Age: 27
End: 2022-10-11

## 2022-10-12 ENCOUNTER — APPOINTMENT (OUTPATIENT)
Dept: LAB | Age: 27
End: 2022-10-12
Payer: COMMERCIAL

## 2022-10-12 DIAGNOSIS — K21.9 GASTROESOPHAGEAL REFLUX DISEASE, UNSPECIFIED WHETHER ESOPHAGITIS PRESENT: ICD-10-CM

## 2022-10-12 PROBLEM — R05.9 COUGH: Status: RESOLVED | Noted: 2020-12-31 | Resolved: 2022-10-12

## 2022-10-12 PROBLEM — Z00.00 MEDICARE ANNUAL WELLNESS VISIT, SUBSEQUENT: Status: RESOLVED | Noted: 2021-06-29 | Resolved: 2022-10-12

## 2022-10-12 LAB
ALBUMIN SERPL BCP-MCNC: 4 G/DL (ref 3.5–5)
ALP SERPL-CCNC: 53 U/L (ref 46–116)
ALT SERPL W P-5'-P-CCNC: 22 U/L (ref 12–78)
AST SERPL W P-5'-P-CCNC: 15 U/L (ref 5–45)
BILIRUB DIRECT SERPL-MCNC: 0.2 MG/DL (ref 0–0.2)
BILIRUB SERPL-MCNC: 1.29 MG/DL (ref 0.2–1)
PROT SERPL-MCNC: 7.7 G/DL (ref 6.4–8.4)

## 2022-10-12 PROCEDURE — 80076 HEPATIC FUNCTION PANEL: CPT

## 2022-10-12 PROCEDURE — 36415 COLL VENOUS BLD VENIPUNCTURE: CPT

## 2022-10-14 DIAGNOSIS — R17 TOTAL BILIRUBIN, ELEVATED: Primary | ICD-10-CM

## 2022-10-19 ENCOUNTER — OFFICE VISIT (OUTPATIENT)
Dept: FAMILY MEDICINE CLINIC | Facility: CLINIC | Age: 27
End: 2022-10-19
Payer: COMMERCIAL

## 2022-10-19 VITALS
OXYGEN SATURATION: 99 % | HEIGHT: 62 IN | BODY MASS INDEX: 26.43 KG/M2 | HEART RATE: 84 BPM | WEIGHT: 143.6 LBS | TEMPERATURE: 97.2 F | SYSTOLIC BLOOD PRESSURE: 112 MMHG | DIASTOLIC BLOOD PRESSURE: 88 MMHG | RESPIRATION RATE: 16 BRPM

## 2022-10-19 DIAGNOSIS — T14.8XXA BRUISING: Primary | ICD-10-CM

## 2022-10-19 DIAGNOSIS — R10.9 RIGHT SIDED ABDOMINAL PAIN: ICD-10-CM

## 2022-10-19 DIAGNOSIS — R11.2 NAUSEA AND VOMITING, UNSPECIFIED VOMITING TYPE: Primary | ICD-10-CM

## 2022-10-19 DIAGNOSIS — E55.9 VITAMIN D DEFICIENCY: ICD-10-CM

## 2022-10-19 DIAGNOSIS — R10.11 RUQ PAIN: ICD-10-CM

## 2022-10-19 DIAGNOSIS — Z23 ENCOUNTER FOR IMMUNIZATION: ICD-10-CM

## 2022-10-19 PROCEDURE — 99214 OFFICE O/P EST MOD 30 MIN: CPT | Performed by: NURSE PRACTITIONER

## 2022-10-19 PROCEDURE — G0008 ADMIN INFLUENZA VIRUS VAC: HCPCS

## 2022-10-19 PROCEDURE — 90682 RIV4 VACC RECOMBINANT DNA IM: CPT

## 2022-10-19 NOTE — ASSESSMENT & PLAN NOTE
May be nutritional deficiency, continue multivitamin, reinforce adequate intake of lean protein  Will check celiac antibodies, iron study, pt/inr

## 2022-10-19 NOTE — PROGRESS NOTES
Name: Osmany Santillan      : 1995      MRN: [id number]  Encounter Provider: MARGARET Lyons  Encounter Date: 10/19/2022   Encounter department: [facility]     1  Bruising  Assessment & Plan: May be nutritional deficiency, continue multivitamin, reinforce adequate intake of lean protein  Will check celiac antibodies, iron study, pt/inr  Orders:  -     Endomysial antibody, IgA; Future  -     t-Transglutaminase (tTG) IgG; Future  -     Protime-INR; Future  -     Iron Panel (Includes Ferritin, Iron Sat%, Iron, and TIBC); Future    2  Vitamin D deficiency  -     Vitamin D 25 hydroxy; Future    3  Encounter for immunization  -     influenza vaccine, quadrivalent, recombinant, PF, 0 5 mL, for patients 18 yr+ (FLUBLOK)    4  Right sided abdominal pain  Assessment & Plan:  Work up so far unremarkable but GI planning to schedule hida scan  Phenergan controlling nausea  Will repeat celiac testing, f/u as planned with GI  Subjective      Pt is a 24 y o  y/o female who is seen today for evaluation of bruising, abd pain  PMH of DM I, hashimoto's, amenorrhea, bipolar disorder, PTSD, FRANCIS, HLD, bruising  Working with GI currently regarding abdominal pain, abnormal bilirubin  They started her on carafate, protonix, phenergan  She is taking the phenergan regularly, this is helping her tolerate PO  They indicate that next step would be a gall bladder function test since work up so far has been unremarkable, she will be scheduling hida scan  She denies diarrhea, she is very constipated since she was in the hospital   She is taking miralax and simethicone  She feels very fatigued, she is napping during the day and sleeps through the night  She feels her diet is improved compared to historical diet  She is eating chicken, avoids fried/greasy foods  She says she eats about a half a plate of steamed mixed vegetables a day        Review of Systems Constitutional: Positive for fatigue  Negative for appetite change, chills, diaphoresis and fever  Respiratory: Negative for shortness of breath  Cardiovascular: Negative for chest pain and palpitations  Gastrointestinal: Positive for abdominal distention, abdominal pain, constipation and nausea (treated with phenergan)  Negative for vomiting  Genitourinary: Positive for menstrual problem  Musculoskeletal: Negative for arthralgias  Skin: Positive for color change (bruising)  Negative for rash  Neurological: Negative for dizziness and light-headedness  Psychiatric/Behavioral: Positive for sleep disturbance (sleeping a lot)  Current Outpatient Medications on File Prior to Visit   Medication Sig   • clonazePAM (KlonoPIN) 0 5 mg tablet Take 1 tablet (0 5 mg total) by mouth 2 (two) times a day (Patient taking differently: Take 0 5 mg by mouth 2 (two) times a day as needed)   • Cyanocobalamin (VITAMIN B 12 PO) Take by mouth   • folic acid (FOLVITE) 1 mg tablet Take 1 tablet (1 mg total) by mouth daily   • gabapentin (Neurontin) 300 mg capsule Take 1 capsule (300 mg total) by mouth daily at bedtime   • glucagon 1 MG injection 1 mg   • hydroxychloroquine (PLAQUENIL) 200 mg tablet TAKE 1 TABLET (200 MG TOTAL) BY MOUTH 2 (TWO) TIMES A DAY WITH MEALS   • insulin aspart (NovoLOG) 100 units/mL injection Inject  Units under the skin   • Insulin Pen Needle (BD PEN NEEDLE NASIM U/F) 32G X 4 MM MISC by Does not apply route 4 (four) times a day for 180 days   • levonorgestrel-ethinyl estradiol (Altavera) 0 15-30 MG-MCG per tablet Take 1 tablet by mouth daily   • levothyroxine 25 mcg tablet Take 1 tablet (25 mcg total) by mouth daily   • metFORMIN (GLUCOPHAGE-XR) 500 mg 24 hr tablet 1,000 mg 2 (two) times a day with meals     • methotrexate 2 5 mg tablet Take 6 tabs once weekly  • pantoprazole (PROTONIX) 40 mg tablet Take 1 tablet (40 mg total) by mouth in the morning     • polyethylene glycol (MIRALAX) 17 g packet Take 17 g by mouth daily as needed (constipation)   • promethazine (PHENERGAN) 12 5 MG tablet Take 1 tablet (12 5 mg total) by mouth every 6 (six) hours as needed for nausea or vomiting   • simethicone (MYLICON) 80 mg chewable tablet Chew 1 tablet (80 mg total) every 6 (six) hours as needed for flatulence   • sucralfate (CARAFATE) 1 g/10 mL suspension Take 10 mL (1 g total) by mouth 2 (two) times a day   • calcium carbonate (TUMS) 500 mg chewable tablet Chew 1 tablet (500 mg total) daily (Patient not taking: Reported on 10/19/2022)   • insulin detemir (LEVEMIR) 100 units/mL subcutaneous injection Inject 17 Units under the skin daily at bedtime (Patient not taking: Reported on 10/19/2022)   • insulin lispro (HumaLOG) 100 units/mL injection Inject 1-5 Units under the skin 3 (three) times a day before meals Take Humalog 3x a day before meals  Insulin to carb ratio of 10  ISF 30 for target of 120 (Patient not taking: Reported on 10/19/2022)       Objective     /88 (BP Location: Left arm, Patient Position: Sitting, Cuff Size: Standard)   Pulse 84   Temp (!) 97 2 °F (36 2 °C) (Temporal)   Resp 16   Ht 5' 2" (1 575 m)   Wt 65 1 kg (143 lb 9 6 oz)   SpO2 99%   BMI 26 26 kg/m²     Physical Exam  Vitals reviewed  Constitutional:       General: She is awake  She is not in acute distress  Appearance: Normal appearance  She is well-developed and well-groomed  She is not ill-appearing or diaphoretic  Eyes:      General: Lids are normal       Conjunctiva/sclera: Conjunctivae normal    Cardiovascular:      Rate and Rhythm: Normal rate and regular rhythm  Pulses: Normal pulses  Heart sounds: Normal heart sounds  No murmur heard  Pulmonary:      Effort: Pulmonary effort is normal       Breath sounds: Normal breath sounds  Abdominal:      General: Abdomen is flat  Bowel sounds are normal       Tenderness: There is generalized abdominal tenderness     Musculoskeletal:         General: Normal range of motion  Right lower leg: No edema  Left lower leg: No edema  Skin:     General: Skin is warm and dry  Coloration: Skin is pale  Neurological:      Mental Status: She is alert and oriented to person, place, and time  Psychiatric:         Attention and Perception: Attention normal          Speech: Speech normal          Behavior: Behavior normal  Behavior is cooperative  Thought Content:  Thought content normal          Cognition and Memory: Cognition normal          Judgment: Judgment normal        MARGARET Carrasco

## 2022-10-19 NOTE — ASSESSMENT & PLAN NOTE
Work up so far unremarkable but GI planning to schedule hida scan  Phenergan controlling nausea    Will repeat celiac testing, f/u as planned with GI

## 2022-10-19 NOTE — PROGRESS NOTES
Patient's shoes and socks removed  Right Foot/Ankle   Right Foot Inspection  Skin Exam: skin normal, skin intact, dry skin, callus and callus  No warmth, no erythema, no maceration, no abnormal color, no pre-ulcer and no ulcer  Toe Exam: ROM and strength within normal limits  Sensory   Monofilament testing: intact    Vascular  Capillary refills: < 3 seconds          Left Foot/Ankle  Left Foot Inspection  Skin Exam: skin normal, skin intact, dry skin and callus  No warmth, no erythema, no maceration, normal color, no pre-ulcer and no ulcer  Toe Exam: ROM and strength within normal limits       Sensory   Monofilament testing: intact    Vascular  Capillary refills: < 3 seconds

## 2022-10-20 ENCOUNTER — SOCIAL WORK (OUTPATIENT)
Dept: BEHAVIORAL/MENTAL HEALTH CLINIC | Facility: CLINIC | Age: 27
End: 2022-10-20
Payer: COMMERCIAL

## 2022-10-20 DIAGNOSIS — F42.2 MIXED OBSESSIONAL THOUGHTS AND ACTS: Primary | ICD-10-CM

## 2022-10-20 DIAGNOSIS — F41.0 GENERALIZED ANXIETY DISORDER WITH PANIC ATTACKS: ICD-10-CM

## 2022-10-20 DIAGNOSIS — F43.12 CHRONIC POST-TRAUMATIC STRESS DISORDER (PTSD): ICD-10-CM

## 2022-10-20 DIAGNOSIS — F31.4 BIPOLAR DISORDER, CURRENT EPISODE DEPRESSED, SEVERE, WITHOUT PSYCHOTIC FEATURES (HCC): ICD-10-CM

## 2022-10-20 DIAGNOSIS — F41.1 GENERALIZED ANXIETY DISORDER WITH PANIC ATTACKS: ICD-10-CM

## 2022-10-20 PROCEDURE — 90834 PSYTX W PT 45 MINUTES: CPT | Performed by: SOCIAL WORKER

## 2022-10-20 NOTE — PSYCH
Psychotherapy Provided: Individual Psychotherapy 50 minutes     Length of time in session: 50 minutes, follow up in 2 week    Visit Time    Visit Start Time: 6426  Visit Stop Time: 1848  Total Visit Duration: 46    Encounter Diagnosis     ICD-10-CM    1  Mixed obsessional thoughts and acts  F42 2    2  Generalized anxiety disorder with panic attacks  F41 1     F41 0    3  Chronic post-traumatic stress disorder (PTSD)  F43 12    4  Bipolar disorder, current episode depressed, severe, without psychotic features ([facility] [address] )  F31 4        Goals addressed in session: Goal 1 and Goal 2     Pain:      moderate to severe    7    Current suicide risk : Low     D: Met with Jenny individually  States has felt sick since hospital admission - stomach pain, distension, constipation, pain  Blamed DKA  Originally went to ED as per Felicia Garnett - stated it was a connective tissue disorder  Felt ignored in hospital   Loosing weight - wants this but not due to minimal food intake - must take Phenergan  Doesn't like how she got here  Denied SI    A: Elvin Munoz presented fatigued and discouraged about above circumstances  P: Continue individual therapy    Behavioral Health Treatment Plan [facility]: Diagnosis and Treatment Plan explained to Jose Armando Mitchell relates understanding diagnosis and is agreeable to Treatment Plan   Yes

## 2022-10-21 DIAGNOSIS — K21.9 GASTROESOPHAGEAL REFLUX DISEASE, UNSPECIFIED WHETHER ESOPHAGITIS PRESENT: ICD-10-CM

## 2022-10-24 RX ORDER — SUCRALFATE ORAL 1 G/10ML
1 SUSPENSION ORAL 2 TIMES DAILY
Qty: 600 ML | Refills: 0 | Status: SHIPPED | OUTPATIENT
Start: 2022-10-24 | End: 2022-11-23

## 2022-10-26 ENCOUNTER — VBI (OUTPATIENT)
Dept: ADMINISTRATIVE | Facility: OTHER | Age: 27
End: 2022-10-26

## 2022-10-26 ENCOUNTER — OFFICE VISIT (OUTPATIENT)
Dept: OBGYN CLINIC | Facility: CLINIC | Age: 27
End: 2022-10-26

## 2022-10-26 ENCOUNTER — APPOINTMENT (OUTPATIENT)
Dept: LAB | Age: 27
End: 2022-10-26

## 2022-10-26 VITALS — SYSTOLIC BLOOD PRESSURE: 112 MMHG | DIASTOLIC BLOOD PRESSURE: 64 MMHG | BODY MASS INDEX: 25.83 KG/M2 | WEIGHT: 141.2 LBS

## 2022-10-26 DIAGNOSIS — Z32.02 PREGNANCY TEST NEGATIVE: ICD-10-CM

## 2022-10-26 DIAGNOSIS — R39.15 URINARY URGENCY: ICD-10-CM

## 2022-10-26 DIAGNOSIS — N92.1 BREAKTHROUGH BLEEDING ON BIRTH CONTROL PILLS: Primary | ICD-10-CM

## 2022-10-26 DIAGNOSIS — R17 TOTAL BILIRUBIN, ELEVATED: ICD-10-CM

## 2022-10-26 DIAGNOSIS — E55.9 VITAMIN D DEFICIENCY: ICD-10-CM

## 2022-10-26 DIAGNOSIS — T14.8XXA BRUISING: ICD-10-CM

## 2022-10-26 LAB
25(OH)D3 SERPL-MCNC: 31.9 NG/ML (ref 30–100)
ALBUMIN SERPL BCP-MCNC: 4 G/DL (ref 3.5–5)
ALP SERPL-CCNC: 48 U/L (ref 46–116)
ALT SERPL W P-5'-P-CCNC: 19 U/L (ref 12–78)
AST SERPL W P-5'-P-CCNC: 11 U/L (ref 5–45)
BILIRUB DIRECT SERPL-MCNC: 0.27 MG/DL (ref 0–0.2)
BILIRUB SERPL-MCNC: 1.22 MG/DL (ref 0.2–1)
FERRITIN SERPL-MCNC: 35 NG/ML (ref 8–388)
INR PPP: 0.99 (ref 0.84–1.19)
IRON SATN MFR SERPL: 42 % (ref 15–50)
IRON SERPL-MCNC: 136 UG/DL (ref 50–170)
PROT SERPL-MCNC: 7.4 G/DL (ref 6.4–8.4)
PROTHROMBIN TIME: 13.3 SECONDS (ref 11.6–14.5)
SL AMB POCT URINE HCG: NORMAL
TIBC SERPL-MCNC: 323 UG/DL (ref 250–450)

## 2022-10-27 ENCOUNTER — OFFICE VISIT (OUTPATIENT)
Dept: RHEUMATOLOGY | Facility: CLINIC | Age: 27
End: 2022-10-27
Payer: COMMERCIAL

## 2022-10-27 VITALS — HEART RATE: 90 BPM | SYSTOLIC BLOOD PRESSURE: 112 MMHG | DIASTOLIC BLOOD PRESSURE: 73 MMHG | TEMPERATURE: 98.7 F

## 2022-10-27 DIAGNOSIS — Z79.631 METHOTREXATE, LONG TERM, CURRENT USE: ICD-10-CM

## 2022-10-27 DIAGNOSIS — E10.9 TYPE 1 DIABETES MELLITUS WITHOUT COMPLICATION (HCC): ICD-10-CM

## 2022-10-27 DIAGNOSIS — E06.3 HASHIMOTO'S DISEASE: ICD-10-CM

## 2022-10-27 DIAGNOSIS — R76.8 ELEVATED RHEUMATOID FACTOR: ICD-10-CM

## 2022-10-27 DIAGNOSIS — M35.9 UNDIFFERENTIATED CONNECTIVE TISSUE DISEASE (HCC): Primary | ICD-10-CM

## 2022-10-27 DIAGNOSIS — Z79.899 LONG-TERM USE OF PLAQUENIL: ICD-10-CM

## 2022-10-27 LAB — BACTERIA UR CULT: NORMAL

## 2022-10-27 PROCEDURE — 99215 OFFICE O/P EST HI 40 MIN: CPT | Performed by: INTERNAL MEDICINE

## 2022-10-27 RX ORDER — HYDROXYCHLOROQUINE SULFATE 200 MG/1
200 TABLET, FILM COATED ORAL 2 TIMES DAILY WITH MEALS
Qty: 180 TABLET | Refills: 3 | Status: SHIPPED | OUTPATIENT
Start: 2022-10-27 | End: 2023-10-22

## 2022-10-27 RX ORDER — FOLIC ACID 1 MG/1
1 TABLET ORAL DAILY
Qty: 90 TABLET | Refills: 3 | Status: SHIPPED | OUTPATIENT
Start: 2022-10-27

## 2022-10-27 NOTE — PROGRESS NOTES
Assessment and Plan:   Ms Chon Briceño a 79-year-old female with history significant for type 1 insulin-dependent diabetes mellitus diagnosed in 2019 and Hashimoto's thyroiditis diagnosed in 2020, who presents for a follow-up of an undifferentiated connective tissue disorder (diagnosed based on a positive SOHAM 1:80 nuclear, speckled, homogeneous patterns, arthralgias, malar rash/photosensitivity)  She is currently on hydroxychloroquine 200 mg twice daily and methotrexate 15 mg once weekly  # Undifferentiated connective tissue disease  - Lex Pablo presents today for a follow-up of an undifferentiated connective tissue disease for which she is currently on hydroxychloroquine 200 mg twice daily that was started in July 2021 and methotrexate 15 mg once weekly that was started in June 2022  Overall she seems to be doing great on this regimen and we will continue it unchanged  I requested she update her connective tissue disease lab monitoring in the next 3 months and she will follow-up with ophthalmology for annual eye exams     - In regards to the chronic gastrointestinal issues I suspect less likely that this is related to a rheumatic etiology since she is overall doing well from a connective tissue disease perspective with normal serologies  I recommend she continue a workup through Gastroenterology  If there are concerns that methotrexate may have aggravated the GI issues if required we can discuss temporarily holding this        Plan:  Diagnoses and all orders for this visit:    Undifferentiated connective tissue disease ([facility] )  -     C-reactive protein; Future  -     Sedimentation rate, automated; Future  -     C4 complement; Future  -     C3 complement; Future  -     Anti-DNA antibody, double-stranded; Future  -     Urinalysis with microscopic  -     Protein / creatinine ratio, urine  -     CBC and differential; Future  -     Comprehensive metabolic panel; Future  -     folic acid (FOLVITE) 1 mg tablet;  Take 1 tablet (1 mg total) by mouth daily  -     hydroxychloroquine (PLAQUENIL) 200 mg tablet; Take 1 tablet (200 mg total) by mouth 2 (two) times a day with meals    Long-term use of Plaquenil    Methotrexate, long term, current use    Elevated rheumatoid factor    Hashimoto's disease    Type 1 diabetes mellitus without complication ([facility] [address] )      Activities as tolerated  Exercise: try to maintain a low impact exercise regimen as much as possible  Walk for 30 minutes a day for at least 3 days a week  Continue other medications as prescribed by PCP and other specialists  RTC in 4 months          HPI    INITIAL VISIT NOTE (6/2021):  Ms Jannette Albarran a 30-year-old female with history significant for type 1 insulin-dependent diabetes mellitus diagnosed in 2019 and Hashimoto's thyroiditis (elevated thyroid microsomal and thyroglobulin antibodies) diagnosed in 2020, who presents for further evaluation of a positive SOHAM 1:80 nuclear, speckled, homogeneous patterns and rheumatoid factor of 10, in addition to widespread joint pains   She is referred by MARGARET Figueroa for a rheumatology consult        Patient reports she started to feel unwell approximately 2 years ago and further evaluation for this led to the diagnosis of type 1 diabetes as well as the Hashimoto's thyroiditis  Mavis Suma has been managing the diabetes with insulin and states for the hypothyroidism as a result of Hashimoto's thyroiditis she was only started on levothyroxine 25 mcg once daily approximately 1 month ago  Serene Landaverde has been a conflict between the endocrinologists she has seen in regards to starting the levothyroxine, but due to progressive complaints which the patient and her primary care physician felt was related to hypothyroidism she was finally started on thyroid supplementation 1 month ago  Mavis Miles has not noticed a change in her symptoms so far and has not had a TSH rechecked yet   The TSH was slightly elevated at 5 24 on 5/1 prior to starting the levothyroxine  Giovanna Austin is also scheduled to see a private endocrinologist for another opinion      She reports over the past 1 and half years she has also started to experience joint and muscle pain which has been gradually progressive   She experiences a degree of pain on a daily basis but states that her symptoms can spontaneously flare-up as well as with changes in the weather, especially when it is cold/damp/humid  Giovanna Austin does feel better with the warmer weather and has also started utilizing a therapy pool which does help with her symptoms  Giovanna Austin has noticed joint pains to affect her hands occasionally but prominently in her wrists   She will notice pain in her shoulders and hips mostly with manipulation and range of motion   She describes chronic pain that will also affect her knees as well as in her ankles and feet   At times she will notice a muscle pain throughout her upper and lower extremities as well   She has noticed muscle cramps to affect her hands and feet   No significant joint pains in her elbows or low back   She has not noticed any obvious swelling of her wrists but feels like they may be swollen as she can gauge this by her bracelet  Giovanna Austin has also noticed swelling to affect her ankles   She does experience morning stiffness which affects her diffusely and takes about 30-40 minutes to improve   She tries to avoid Tylenol as this affects her blood glucose monitoring   She has tried ibuprofen for her symptoms which has not helped significantly      Other than the body pain she also describes chronic fatigue, unintentional weight gain and hair thinning   She denies fevers, unintentional weight loss, focal alopecia, dry eyes, dry mouth, inflammatory eye disease, skin rash, psoriasis, photosensitivity, mouth/nose ulcers, swollen glands, pleuritic chest pain, shortness of breath, inflammatory bowel disease, blood clots (reports a history of 1 very early miscarriage), Raynaud's or a family history of autoimmune disease      Testing done for her symptoms showed the positive SOHAM and borderline positive rheumatoid factor   A rheumatoid factor was negative in 2019   An ESR, CRP, anti CCP antibody, CK, Lyme antibody profile, anti neutrophilic cytoplasmic antibodies, Sjogren's antibodies and anti double-stranded DNA antibody were normal   An MRI of her right knee and ultrasound of her right Achilles tendon in 2018 were normal      In view of her complaints she was also evaluated by Neurology to rule out multiple sclerosis and currently there is no specific diagnosis from their perspective   She did have an MRI of her brain done last year which showed a single focus of white matter change which has been stable since 2014 7/13/2021:  Patient presents for a follow-up of a positive SOHAM  I reviewed her workup done following the last office visit which showed an unremarkable SOHAM specificity, C3, C4, antiphospholipid antibody testing, urinalysis, urine protein creatinine ratio, vitamin-D, CK, anti CCP antibody and HLA B27 antigen  X-rays of her hands and feet were unremarkable        She reports there has been no change in her symptoms since the last office visit and she continues to describe the chronic fatigue, muscle aches/spasms as well as ongoing joint pains  She is scheduled for another endocrinology opinion tomorrow to see if any of her symptoms may be related to the underlying hypothyroidism, but this is not felt to be the case so far       2/16/2022:  Patient presents for follow-up of an undifferentiated connective tissue disease  She is currently on hydroxychloroquine 200 mg twice daily that was started in July 2021  She reports in about September she started to notice a significant improvement in her well-being with being on the hydroxychloroquine  There was an improvement in her fatigue and joint pains    She had overall been doing well up until January but after she received the COVID-19 booster vaccination noticed a flare-up in her symptoms with more fatigue as well as significant joint pains which mostly affected her hands, wrists, knees and ankles  She has been noticing intermittent swelling of her fingers as well as ankles/feet  She has been taking Tylenol alternating with ibuprofen but is unsure if the medications are specifically helping her or if the side effects as a result of the vaccination are gradually improving on their own  She was also evaluated by Gastroenterology and had an upper endoscopy done which showed chronic gastritis so she was advised to minimize NSAID use  No recent steroids  Except for the pain she has also had a few occurrences of redness on her face in a butterfly pattern as well as affecting her forehead  She has noticed photosensitivity and states while she was at the beach in August she developed a skin rash in sun-exposed areas  She has been more careful with applying sunscreen as well as covering up in the sun  She denies fevers, weight loss, alopecia, mouth/nose ulcers, swollen glands or pleuritic chest pain  6/22/2022:  Patient presents for follow-up of an undifferentiated connective tissue disease  She is currently on hydroxychloroquine 200 mg twice daily that was started in July 2021  I reviewed her blood work from February which showed a normal CBC, CMP, ESR, CRP, C3, C4, double-stranded DNA antibody, urinalysis and urine protein creatinine ratio  She reports overall since the last office visit she has been fairly stable except for an episode of pericarditis following the COVID-19 booster vaccination in January  She was seen by Cardiology and prescribed colchicine to take for 3 months  She reports the symptoms have mostly resolved and only on occasion will she notice some chest pain which is not long-lasting  She reports with sun exposure she will start to notice more joint pains and fatigue    For the most part she deals with some degree of joint pain on a daily basis and still notices swelling affecting her hands and ankles  She will be starting occupational therapy as due to the joint pain in her hands she has started to feel weakness in her hand strength  No fevers, unintentional weight loss, skin rashes or mouth/nose ulcers  Although she still endorses symptoms she reports overall she has been feeling much better since starting the hydroxychloroquine  10/27/2022:  Patient presents for a follow-up of an undifferentiated connective disease  She is currently on hydroxychloroquine 200 mg twice daily that was started in July 2021 and oral methotrexate 15 mg once weekly that was started in June 2022  I reviewed her recent labs which showed an unremarkable CBC, CMP, ESR, CRP, C3, C4, double-stranded DNA antibody and urine protein creatinine ratio  She reports she has been doing well without any joint pains and the methotrexate really seems to have helped her  With initially starting it she did notice numbness in her hand and legs for which she was seen in the emergency department but as it was unclear if this was related to the methotrexate I requested she resume it and the symptoms have not been consistent  No skin rashes or mouth/nose ulcers  She does report chronic symptoms of abdominal pain, nausea, vomiting and greasy stools for which she has been following with Gastroenterology  A CT abdomen was unremarkable  She is scheduled for a HIDA scan  The following portions of the patient's history were reviewed and updated as appropriate: allergies, current medications, past family history, past medical history, past social history, past surgical history and problem list       Review of Systems  Constitutional: Negative for weight change, fevers, chills, night sweats  ENT/Mouth: Negative for hearing changes, ear pain, nasal congestion, sinus pain, hoarseness, sore throat, rhinorrhea, swallowing difficulty     Eyes: Negative for pain, redness, discharge, vision changes  Cardiovascular: Negative for chest pain, SOB, palpitations  Respiratory: Negative for cough, sputum, wheezing, dyspnea  Gastrointestinal:  Positive for nausea, vomiting, diarrhea, pain  Genitourinary: Negative for dysuria, urinary frequency, hematuria  Musculoskeletal: As per HPI  Skin: Negative for skin rash, color changes  Neuro: Negative for weakness, numbness, tingling, loss of consciousness  Psych: Negative for anxiety, depression  Heme/Lymph: Negative for easy bruising, bleeding, lymphadenopathy  Past Medical History:   Diagnosis Date   • Abdominal pain    • Anxiety    • Anxiety and depression    • COVID-19 12/2020   • Depression    • Diabetes mellitus ([facility] [address] )    • Disease of thyroid gland     Hashimoto   • DKA, type 1 ([facility] ) 5/11/2021   • Eating disorder     history of anorexia/bulemia [id number]   • Fracture of fibula     R Salter I   • Hima Asai disease    • Hashimoto's disease 02/21/2020   • Head injury    • Headache(784 0)    • Nasal congestion    • PTSD (post-traumatic stress disorder)    • Rectal bleed    • Seizures (HCC)    • Type 1 diabetes (HCC)        Past Surgical History:   Procedure Laterality Date   • COLONOSCOPY     • KNEE SURGERY Right 07/06/2020   • NASAL SEPTOPLASTY W/ TURBINOPLASTY     • SINUS SURGERY     • SKIN BIOPSY     • TURBINOPLASTY N/A 3/22/2021    Procedure: Gage Harvey;  Surgeon: Rohit Sarkar MD;  Location: [facility];  Service: ENT   • UPPER GASTROINTESTINAL ENDOSCOPY     • WISDOM TOOTH EXTRACTION     • WRIST SURGERY      left;  Excision of ganglion       Social History     Socioeconomic History   • Marital status: Single     Spouse name: Not on file   • Number of children: 0   • Years of education: Not on file   • Highest education level: Associate degree: academic program   Occupational History   • Occupation: unemployed   Tobacco Use   • Smoking status: Never Smoker   • Smokeless tobacco: Never Used   • Tobacco comment: Tobacco smoke exposure (Father smokes cigars)   Vaping Use   • Vaping Use: Never used   Substance and Sexual Activity   • Alcohol use: Not Currently   • Drug use: Never   • Sexual activity: Yes     Partners: Male     Birth control/protection: Condom Male, OCP   Other Topics Concern   • Not on file   Social History Narrative    Student at [address] a poor diet; low in vegetables, high in sweets    Dental care, regularly    Lives with parents    Sleeps 8-10 hours a day     Social Determinants of Health     Financial Resource Strain: Not on file   Food Insecurity: Not on file   Transportation Needs: Not on file   Physical Activity: Not on file   Stress: Not on file   Social Connections: Not on file   Intimate Partner Violence: Not on file   Housing Stability: Not on file       Family History   Problem Relation Age of Onset   • Hypertension Mother    • Migraines Mother         Headache   • Diabetes type II Mother    • Varicose Veins Mother    • Hyperlipidemia Mother    • Diabetes Mother    • Arthritis Mother    • Depression Mother    • Hearing loss Mother    • Anxiety disorder Mother    • Cholelithiasis Father    • Hypertension Father    • Sarcoidosis Father         Liver   • Hyperlipidemia Father    • Diabetes Father    • Coronary artery disease Father    • Nephrolithiasis Father    • Cirrhosis Father    • Alcohol abuse Father    • Thyroid disease Sister    • Hashimoto's thyroiditis Sister    • Cancer Family    • Diabetes Family    • Hypertension Family    • Alcohol abuse Brother        Allergies   Allergen Reactions   • Insulin Glargine Other (See Comments)     LANTUS BRAND -Burning and redness under skin        Current Outpatient Medications:   •  folic acid (FOLVITE) 1 mg tablet, Take 1 tablet (1 mg total) by mouth daily, Disp: 90 tablet, Rfl: 3  •  hydroxychloroquine (PLAQUENIL) 200 mg tablet, Take 1 tablet (200 mg total) by mouth 2 (two) times a day with meals, Disp: 180 tablet, Rfl: 3  •  calcium carbonate (TUMS) 500 mg chewable tablet, Chew 1 tablet (500 mg total) daily (Patient not taking: No sig reported), Disp: 30 tablet, Rfl: 0  •  clonazePAM (KlonoPIN) 0 5 mg tablet, Take 1 tablet (0 5 mg total) by mouth 2 (two) times a day (Patient taking differently: Take 0 5 mg by mouth 2 (two) times a day as needed), Disp: 45 tablet, Rfl: 2  •  Cyanocobalamin (VITAMIN B 12 PO), Take by mouth, Disp: , Rfl:   •  gabapentin (Neurontin) 300 mg capsule, Take 1 capsule (300 mg total) by mouth daily at bedtime, Disp: 60 capsule, Rfl: 0  •  glucagon 1 MG injection, 1 mg (Patient not taking: Reported on 10/26/2022), Disp: , Rfl:   •  insulin aspart (NovoLOG) 100 units/mL injection, Inject  Units under the skin, Disp: , Rfl:   •  Insulin Pen Needle (BD PEN NEEDLE NASIM U/F) 32G X 4 MM MISC, by Does not apply route 4 (four) times a day for 180 days, Disp: 400 each, Rfl: 3  •  levonorgestrel-ethinyl estradiol (Altavera) 0 15-30 MG-MCG per tablet, Take 1 tablet by mouth daily, Disp: 84 tablet, Rfl: 3  •  levothyroxine 25 mcg tablet, Take 1 tablet (25 mcg total) by mouth daily, Disp: 60 tablet, Rfl: 0  •  metFORMIN (GLUCOPHAGE-XR) 500 mg 24 hr tablet, 1,000 mg 2 (two) times a day with meals  , Disp: , Rfl:   •  methotrexate 2 5 mg tablet, Take 6 tabs once weekly  , Disp: 72 tablet, Rfl: 1  •  pantoprazole (PROTONIX) 40 mg tablet, Take 1 tablet (40 mg total) by mouth in the morning , Disp: 90 tablet, Rfl: 3  •  polyethylene glycol (MIRALAX) 17 g packet, Take 17 g by mouth daily as needed (constipation), Disp: 510 g, Rfl: 0  •  promethazine (PHENERGAN) 12 5 MG tablet, Take 1 tablet (12 5 mg total) by mouth every 6 (six) hours as needed for nausea or vomiting, Disp: 30 tablet, Rfl: 0  •  simethicone (MYLICON) 80 mg chewable tablet, Chew 1 tablet (80 mg total) every 6 (six) hours as needed for flatulence, Disp: 30 tablet, Rfl: 0  •  sucralfate (CARAFATE) 1 g/10 mL suspension, TAKE 10 ML (1 G TOTAL) BY MOUTH 2 (TWO) TIMES A DAY, Disp: 600 mL, Rfl: 0      Objective:    Vitals:    10/27/22 1526   BP: 112/73   Pulse: 90   Temp: 98 7 °F (37 1 °C)       Physical Exam  General: Well appearing, well nourished, in no distress  Oriented x 3, normal mood and affect  Ambulating without difficulty  Skin: Good turgor, no rash today, unusual bruising or prominent lesions  Hair: Normal texture and distribution  Nails: Normal color, no deformities  HEENT:  Head: Normocephalic, atraumatic  Eyes: Conjunctiva clear, sclera non-icteric, EOM intact  Extremities: No amputations or deformities, cyanosis, edema  Musculoskeletal:    There is no soft tissue swelling noted  Neurologic: Alert and oriented  No focal neurological deficits appreciated  Psychiatric: Normal mood and affect  DEVONTE Spring    Rheumatology

## 2022-10-28 LAB
ENDOMYSIUM IGA SER QL: NEGATIVE
TTG IGG SER-ACNC: <2 U/ML (ref 0–5)

## 2022-10-28 NOTE — PROGRESS NOTES
GYN Problem Visit    HPI:    Patient reports brown D/C x 1 month  Started over a month ago  Mucus/tissue passes during urination  Has not missed any pills  Increased urinary urgency  Urine and d/c has a strong odor  Not a fishy odor/  Lower abdominal sensitivity - ? Cramps  Has been amenorrheic on pill for 2 years  Denies vaginal burning or itching  Unexpected weight loss since July of 20 lbs - has been sick with n/v  No STD concerns  PMH, PSH, Meds, Allergies, SocHx, FamHx reviewed and no changes noted  ROS:  Pertinent findings in HPI    Vitals:    10/26/22 1424   BP: 112/64       Physical Exam  Constitutional:       Appearance: Normal appearance  HENT:      Head: Normocephalic  Cardiovascular:      Rate and Rhythm: Normal rate and regular rhythm  Pulmonary:      Effort: Pulmonary effort is normal    Abdominal:      General: Abdomen is flat  There is no distension  Musculoskeletal:         General: No swelling  Neurological:      General: No focal deficit present  Mental Status: She is alert and oriented to person, place, and time  Skin:     General: Skin is warm and dry  Psychiatric:         Mood and Affect: Mood normal          Behavior: Behavior normal    Vitals reviewed  Assessment/Plan:    1  Urinary urgency  - Urine culture    2  Breakthrough bleeding on birth control pills - most likely due to atrophic endometrium  - discussed taking OCP break  - US pelvis complete w transvaginal; Future    3   Pregnancy test negative  - POCT urine HCG

## 2022-10-30 ENCOUNTER — APPOINTMENT (OUTPATIENT)
Dept: RADIOLOGY | Age: 27
End: 2022-10-30

## 2022-10-30 ENCOUNTER — HOSPITAL ENCOUNTER (OUTPATIENT)
Dept: NUCLEAR MEDICINE | Facility: HOSPITAL | Age: 27
Discharge: HOME | End: 2022-10-30

## 2022-10-30 ENCOUNTER — OFFICE VISIT (OUTPATIENT)
Dept: URGENT CARE | Age: 27
End: 2022-10-30

## 2022-10-30 VITALS
TEMPERATURE: 97.9 F | RESPIRATION RATE: 16 BRPM | OXYGEN SATURATION: 99 % | DIASTOLIC BLOOD PRESSURE: 77 MMHG | SYSTOLIC BLOOD PRESSURE: 112 MMHG | HEART RATE: 82 BPM

## 2022-10-30 DIAGNOSIS — R11.2 NAUSEA AND VOMITING, UNSPECIFIED VOMITING TYPE: ICD-10-CM

## 2022-10-30 DIAGNOSIS — R10.11 RUQ PAIN: ICD-10-CM

## 2022-10-30 DIAGNOSIS — M79.641 RIGHT HAND PAIN: Primary | ICD-10-CM

## 2022-10-30 DIAGNOSIS — M79.641 RIGHT HAND PAIN: ICD-10-CM

## 2022-10-30 RX ADMIN — SINCALIDE 1.3 MCG: 5 INJECTION, POWDER, LYOPHILIZED, FOR SOLUTION INTRAVENOUS at 12:57

## 2022-10-30 NOTE — PROGRESS NOTES
[address] [facility]        NAME: Heather Kemp is a 24 y o  female  : 1995    MRN: [id number]  DATE: 2022  TIME: 5:52 PM    Assessment and Plan   Right hand pain [M79 641]  1  Right hand pain  XR hand 3+ vw right       X-ray of right hand reviewed  No acute fracture appreciated  Awaiting final read per radiology department  Discussed rest, ice, compression, elevation  Patient may take Tylenol as needed for pain  She agrees with this plan of care, all questions and concerns were addressed during this visit  Patient Instructions     Supportive care as discussed  Follow up with PCP in 3-5 days  Proceed to  ER if symptoms worsen  Chief Complaint     Chief Complaint   Patient presents with   • Hand Injury     Hit right hand on dresser, unable to move any finger, cold, full sensation, today         History of Present Illness       Patient presenting for evaluation of finger injuries  Patient states that she was hammering a dresser when she accidentally hit her fingers onto the dresser  She states that this happened approximately 1 hour ago  She states the pain is a 3/10  She denies any numbness or tingling, but states the pain is a 3/10  She denies any current treatment for her symptoms  Review of Systems   Review of Systems   Constitutional: Negative for chills and fever  HENT: Negative for ear pain and sore throat  Eyes: Negative for pain and visual disturbance  Respiratory: Negative for cough and shortness of breath  Cardiovascular: Negative for chest pain and palpitations  Gastrointestinal: Negative for abdominal pain and vomiting  Genitourinary: Negative for dysuria and hematuria  Musculoskeletal: Positive for arthralgias  Negative for back pain  Skin: Negative for color change and rash  Neurological: Negative for seizures and syncope  All other systems reviewed and are negative          Current Medications       Current Outpatient Medications:   • Cyanocobalamin (VITAMIN B 12 PO), Take by mouth, Disp: , Rfl:   •  folic acid (FOLVITE) 1 mg tablet, Take 1 tablet (1 mg total) by mouth daily, Disp: 90 tablet, Rfl: 3  •  hydroxychloroquine (PLAQUENIL) 200 mg tablet, Take 1 tablet (200 mg total) by mouth 2 (two) times a day with meals, Disp: 180 tablet, Rfl: 3  •  insulin aspart (NovoLOG) 100 units/mL injection, Inject  Units under the skin, Disp: , Rfl:   •  levonorgestrel-ethinyl estradiol (Altavera) 0 15-30 MG-MCG per tablet, Take 1 tablet by mouth daily, Disp: 84 tablet, Rfl: 3  •  levothyroxine 25 mcg tablet, Take 1 tablet (25 mcg total) by mouth daily, Disp: 60 tablet, Rfl: 0  •  metFORMIN (GLUCOPHAGE-XR) 500 mg 24 hr tablet, 1,000 mg 2 (two) times a day with meals  , Disp: , Rfl:   •  methotrexate 2 5 mg tablet, Take 6 tabs once weekly  , Disp: 72 tablet, Rfl: 1  •  polyethylene glycol (MIRALAX) 17 g packet, Take 17 g by mouth daily as needed (constipation), Disp: 510 g, Rfl: 0  •  promethazine (PHENERGAN) 12 5 MG tablet, Take 1 tablet (12 5 mg total) by mouth every 6 (six) hours as needed for nausea or vomiting, Disp: 30 tablet, Rfl: 0  •  simethicone (MYLICON) 80 mg chewable tablet, Chew 1 tablet (80 mg total) every 6 (six) hours as needed for flatulence, Disp: 30 tablet, Rfl: 0  •  sucralfate (CARAFATE) 1 g/10 mL suspension, TAKE 10 ML (1 G TOTAL) BY MOUTH 2 (TWO) TIMES A DAY, Disp: 600 mL, Rfl: 0  •  calcium carbonate (TUMS) 500 mg chewable tablet, Chew 1 tablet (500 mg total) daily (Patient not taking: No sig reported), Disp: 30 tablet, Rfl: 0  •  clonazePAM (KlonoPIN) 0 5 mg tablet, Take 1 tablet (0 5 mg total) by mouth 2 (two) times a day (Patient taking differently: Take 0 5 mg by mouth 2 (two) times a day as needed), Disp: 45 tablet, Rfl: 2  •  gabapentin (Neurontin) 300 mg capsule, Take 1 capsule (300 mg total) by mouth daily at bedtime, Disp: 60 capsule, Rfl: 0  •  glucagon 1 MG injection, 1 mg (Patient not taking: Reported on 10/26/2022), Disp: , Rfl:   •  Insulin Pen Needle (BD PEN NEEDLE NASIM U/F) 32G X 4 MM MISC, by Does not apply route 4 (four) times a day for 180 days, Disp: 400 each, Rfl: 3  •  pantoprazole (PROTONIX) 40 mg tablet, Take 1 tablet (40 mg total) by mouth in the morning , Disp: 90 tablet, Rfl: 3  No current facility-administered medications for this visit  Current Allergies     Allergies as of 10/30/2022 - Reviewed 10/30/2022   Allergen Reaction Noted   • Insulin glargine Other (See Comments) 07/02/2019            The following portions of the patient's history were reviewed and updated as appropriate: allergies, current medications, past family history, past medical history, past social history, past surgical history and problem list      Past Medical History:   Diagnosis Date   • Abdominal pain    • Anxiety    • Anxiety and depression    • COVID-19 12/2020   • Depression    • Diabetes mellitus ([facility] [address] )    • Disease of thyroid gland     Hashimoto   • DKA, type 1 ([facility] [address] ) 5/11/2021   • Eating disorder     history of anorexia/bulemia [id number]   • Fracture of fibula     R Salter I   • Hima Asai disease    • Hashimoto's disease 02/21/2020   • Head injury    • Headache(784 0)    • Nasal congestion    • PTSD (post-traumatic stress disorder)    • Rectal bleed    • Seizures ([address] )    • Type 1 diabetes ([facility] [address] )        Past Surgical History:   Procedure Laterality Date   • COLONOSCOPY     • KNEE SURGERY Right 07/06/2020   • NASAL SEPTOPLASTY W/ TURBINOPLASTY     • SINUS SURGERY     • SKIN BIOPSY     • TURBINOPLASTY N/A 3/22/2021    Procedure: Gage Harvey;  Surgeon: Rohit Sarkar MD;  [facility];  Service: ENT   • UPPER GASTROINTESTINAL ENDOSCOPY     • WISDOM TOOTH EXTRACTION     • WRIST SURGERY      left;  Excision of ganglion       Family History   Problem Relation Age of Onset   • Hypertension Mother    • Migraines Mother         Headache   • Diabetes type II Mother    • Varicose Veins Mother    • Hyperlipidemia Mother    • Diabetes Mother    • Arthritis Mother    • Depression Mother    • Hearing loss Mother    • Anxiety disorder Mother    • Cholelithiasis Father    • Hypertension Father    • Sarcoidosis Father         Liver   • Hyperlipidemia Father    • Diabetes Father    • Coronary artery disease Father    • Nephrolithiasis Father    • Cirrhosis Father    • Alcohol abuse Father    • Thyroid disease Sister    • Hashimoto's thyroiditis Sister    • Cancer Family    • Diabetes Family    • Hypertension Family    • Alcohol abuse Brother          Medications have been verified  Objective   /77   Pulse 82   Temp 97 9 °F (36 6 °C)   Resp 16   SpO2 99%        Physical Exam     Physical Exam  Vitals and nursing note reviewed  Constitutional:       General: She is not in acute distress  Appearance: Normal appearance  She is normal weight  She is not ill-appearing  HENT:      Head: Normocephalic and atraumatic  Right Ear: Tympanic membrane normal       Left Ear: Tympanic membrane normal       Nose: Nose normal  No congestion or rhinorrhea  Mouth/Throat:      Mouth: Mucous membranes are moist       Pharynx: Oropharynx is clear  No oropharyngeal exudate or posterior oropharyngeal erythema  Eyes:      General:         Right eye: No discharge  Left eye: No discharge  Extraocular Movements: Extraocular movements intact  Conjunctiva/sclera: Conjunctivae normal       Pupils: Pupils are equal, round, and reactive to light  Cardiovascular:      Rate and Rhythm: Normal rate and regular rhythm  Pulses: Normal pulses  Heart sounds: Normal heart sounds  No murmur heard  No friction rub  No gallop  Pulmonary:      Effort: Pulmonary effort is normal  No respiratory distress  Breath sounds: Normal breath sounds  No stridor  No wheezing, rhonchi or rales  Chest:      Chest wall: No tenderness  Abdominal:      General: Abdomen is flat   Bowel sounds are normal       Palpations: Abdomen is soft  Tenderness: There is no abdominal tenderness  There is no guarding or rebound  Musculoskeletal:         General: Tenderness present  Normal range of motion  Right hand: Tenderness present  Hands:       Cervical back: Normal range of motion and neck supple  No tenderness  Skin:     General: Skin is warm and dry  Capillary Refill: Capillary refill takes less than 2 seconds  Neurological:      General: No focal deficit present  Mental Status: She is alert and oriented to person, place, and time     Psychiatric:         Mood and Affect: Mood normal          Behavior: Behavior normal

## 2022-11-01 ENCOUNTER — TELEMEDICINE (OUTPATIENT)
Dept: BEHAVIORAL/MENTAL HEALTH CLINIC | Facility: CLINIC | Age: 27
End: 2022-11-01

## 2022-11-01 DIAGNOSIS — F42.2 MIXED OBSESSIONAL THOUGHTS AND ACTS: ICD-10-CM

## 2022-11-01 DIAGNOSIS — F41.0 GENERALIZED ANXIETY DISORDER WITH PANIC ATTACKS: ICD-10-CM

## 2022-11-01 DIAGNOSIS — F41.1 GENERALIZED ANXIETY DISORDER WITH PANIC ATTACKS: ICD-10-CM

## 2022-11-01 DIAGNOSIS — F31.4 BIPOLAR DISORDER, CURRENT EPISODE DEPRESSED, SEVERE, WITHOUT PSYCHOTIC FEATURES (HCC): Primary | ICD-10-CM

## 2022-11-01 DIAGNOSIS — F43.12 CHRONIC POST-TRAUMATIC STRESS DISORDER (PTSD): ICD-10-CM
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
## 2022-11-01 NOTE — PSYCH
Virtual Regular Visit    Verification of patient location:    Patient is located in the following state in which I hold an active license [place]      Assessment/Plan:    Problem List Items Addressed This Visit        Other    Chronic post-traumatic stress disorder (PTSD)    Generalized anxiety disorder with panic attacks    OCD (obsessive compulsive disorder)    Bipolar affective disorder ([facility] [address] ) - Primary          Goals addressed in session: Goal 1 and Goal 2          Reason for visit is No chief complaint on file  Encounter provider Chon Roman    Provider located at [address] [place] [id number] [phone]      Recent Visits  No visits were found meeting these conditions  Showing recent visits within past 7 days and meeting all other requirements  Today's Visits  Date Type Provider Dept   11/01/22 Telemedicine Chon Roman [facility] Therapist Ivan   Showing today's visits and meeting all other requirements  Future Appointments  No visits were found meeting these conditions  Showing future appointments within next 150 days and meeting all other requirements       The patient was identified by name and date of birth  Ravi Moraes was informed that this is a telemedicine visit and that the visit is being conducted [employer]  She agrees to proceed     My office door was closed  No one else was in the room  She acknowledged consent and understanding of privacy and security of the video platform  The patient has agreed to participate and understands they can discontinue the visit at any time  Patient is aware this is a billable service  Subjective  Ravi Moraes is a 24 y o  female    D: Met with Alexa Mcgill individually  Dx with Gilbert's syndrome - IBS symptoms have acerbated - usus Phenergan to eat  Last 20# in 2 mths    Living at parents home - 'I'm in a constant state of anxiety with all the changes'  Further discussion of minimal if any conversations with Fuad Armendariz  Specific obstacles discussed  Denied SI    A: Omar Hernandes presented irritable, fatigued and feels she isn't seen or considered by others  P: Continue individual therapy      HPI     Past Medical History:   Diagnosis Date   • Abdominal pain    • Anxiety    • Anxiety and depression    • COVID-19 12/2020   • Depression    • Diabetes mellitus ([facility] [address] )    • Disease of thyroid gland     Hashimoto   • DKA, type 1 ([facility] [address] ) 5/11/2021   • Eating disorder     history of anorexia/bulemia [id number]   • Fracture of fibula     R Salter I   • Enid Raisin disease    • Hashimoto's disease 02/21/2020   • Head injury    • Headache(784 0)    • Nasal congestion    • PTSD (post-traumatic stress disorder)    • Rectal bleed    • Seizures ([facility])    • Type 1 diabetes ([facility])        Past Surgical History:   Procedure Laterality Date   • COLONOSCOPY     • KNEE SURGERY Right 07/06/2020   • NASAL SEPTOPLASTY W/ TURBINOPLASTY     • SINUS SURGERY     • SKIN BIOPSY     • TURBINOPLASTY N/A 3/22/2021    Procedure: Magui Pablo;  Surgeon: Ulises Bonds MD;  [facility];  Service: ENT   • UPPER GASTROINTESTINAL ENDOSCOPY     • WISDOM TOOTH EXTRACTION     • WRIST SURGERY      left;  Excision of ganglion       Current Outpatient Medications   Medication Sig Dispense Refill   • calcium carbonate (TUMS) 500 mg chewable tablet Chew 1 tablet (500 mg total) daily (Patient not taking: No sig reported) 30 tablet 0   • clonazePAM (KlonoPIN) 0 5 mg tablet Take 1 tablet (0 5 mg total) by mouth 2 (two) times a day (Patient taking differently: Take 0 5 mg by mouth 2 (two) times a day as needed) 45 tablet 2   • Cyanocobalamin (VITAMIN B 12 PO) Take by mouth     • folic acid (FOLVITE) 1 mg tablet Take 1 tablet (1 mg total) by mouth daily 90 tablet 3   • gabapentin (Neurontin) 300 mg capsule Take 1 capsule (300 mg total) by mouth daily at bedtime 60 capsule 0   • glucagon 1 MG injection 1 mg (Patient not taking: Reported on 10/26/2022)     • hydroxychloroquine (PLAQUENIL) 200 mg tablet Take 1 tablet (200 mg total) by mouth 2 (two) times a day with meals 180 tablet 3   • insulin aspart (NovoLOG) 100 units/mL injection Inject  Units under the skin     • Insulin Pen Needle (BD PEN NEEDLE NASIM U/F) 32G X 4 MM MISC by Does not apply route 4 (four) times a day for 180 days 400 each 3   • levonorgestrel-ethinyl estradiol (Altavera) 0 15-30 MG-MCG per tablet Take 1 tablet by mouth daily 84 tablet 3   • levothyroxine 25 mcg tablet Take 1 tablet (25 mcg total) by mouth daily 60 tablet 0   • metFORMIN (GLUCOPHAGE-XR) 500 mg 24 hr tablet 1,000 mg 2 (two) times a day with meals       • methotrexate 2 5 mg tablet Take 6 tabs once weekly  72 tablet 1   • pantoprazole (PROTONIX) 40 mg tablet Take 1 tablet (40 mg total) by mouth in the morning  90 tablet 3   • polyethylene glycol (MIRALAX) 17 g packet Take 17 g by mouth daily as needed (constipation) 510 g 0   • promethazine (PHENERGAN) 12 5 MG tablet Take 1 tablet (12 5 mg total) by mouth every 6 (six) hours as needed for nausea or vomiting 30 tablet 0   • simethicone (MYLICON) 80 mg chewable tablet Chew 1 tablet (80 mg total) every 6 (six) hours as needed for flatulence 30 tablet 0   • sucralfate (CARAFATE) 1 g/10 mL suspension TAKE 10 ML (1 G TOTAL) BY MOUTH 2 (TWO) TIMES A  mL 0     No current facility-administered medications for this visit  Allergies   Allergen Reactions   • Insulin Glargine Other (See Comments)     LANTUS BRAND -Burning and redness under skin        Review of Systems    Video Exam    There were no vitals filed for this visit      Physical Exam     11/02/22  Start Time: 1887  Stop Time: 4692  Total Visit Time: 53 minutes

## 2022-11-02 ENCOUNTER — HOSPITAL ENCOUNTER (OUTPATIENT)
Dept: RADIOLOGY | Facility: HOSPITAL | Age: 27
Discharge: HOME | End: 2022-11-02
Attending: OBSTETRICS & GYNECOLOGY

## 2022-11-02 DIAGNOSIS — N92.1 BREAKTHROUGH BLEEDING ON BIRTH CONTROL PILLS: ICD-10-CM

## 2022-11-11 ENCOUNTER — VBI (OUTPATIENT)
Dept: ADMINISTRATIVE | Facility: OTHER | Age: 27
End: 2022-11-11

## 2022-11-21 ENCOUNTER — TELEPHONE (OUTPATIENT)
Dept: OBGYN CLINIC | Facility: HOSPITAL | Age: 27
End: 2022-11-21

## 2022-11-21 ENCOUNTER — DOCUMENTATION (OUTPATIENT)
Dept: RHEUMATOLOGY | Facility: CLINIC | Age: 27
End: 2022-11-21

## 2022-11-21 DIAGNOSIS — M35.9 UNDIFFERENTIATED CONNECTIVE TISSUE DISEASE (HCC): Primary | ICD-10-CM

## 2022-11-21 DIAGNOSIS — M35.9 UNDIFFERENTIATED CONNECTIVE TISSUE DISEASE (HCC): ICD-10-CM

## 2022-11-21 RX ORDER — PREDNISONE 10 MG/1
10 TABLET ORAL DAILY
Qty: 7 TABLET | Refills: 0 | Status: SHIPPED | OUTPATIENT
Start: 2022-11-21 | End: 2022-11-28

## 2022-11-21 RX ORDER — FOLIC ACID 1 MG/1
1 TABLET ORAL DAILY
Qty: 90 TABLET | Refills: 3 | Status: SHIPPED | OUTPATIENT
Start: 2022-11-21

## 2022-11-21 NOTE — TELEPHONE ENCOUNTER
Caller: Patient    Doctor: Dr Romain Dorsey    Reason for call: Patient calling stating that she is starting to have an exacerbation of Lupus symptoms and she is wondering if there is anything that can be prescribed to alleviate symptoms?   Caller asking to speak to clinical team     Call back#: [phone]

## 2022-11-21 NOTE — TELEPHONE ENCOUNTER
Spoke with patient she is having swelling of her ankles, joint pain, rash on her face, weakness and severe fatigue  If she was to go on prednisone she has to check with Endo due to her being a type 1 diabetic  She also asked for a refill of her folic acid, thanks

## 2022-11-21 NOTE — TELEPHONE ENCOUNTER
Spoke with patient and relayed information, she will give us a call back once she has spoken to Endo

## 2022-11-21 NOTE — TELEPHONE ENCOUNTER
Patient called back and LM on clinical line stated she could not get a hold of her endocrinologist  Stated she is looking for prednisone for her flare up?

## 2022-11-21 NOTE — TELEPHONE ENCOUNTER
I will call in prednisone 10 mg once daily for one week, she should check her blood sugars closely  If she needs higher doses of steroids, then I recommend we hear back from her endocrinologist first, but for now we can try the 10 mg dose

## 2022-11-21 NOTE — TELEPHONE ENCOUNTER
Please ask her to check with endocrinology and call us back about the prednisone  I would also like her to increase the methotrexate to 8 tablets once weekly and will send in a refill on this as well as the folic acid

## 2022-11-29 ENCOUNTER — TELEMEDICINE (OUTPATIENT)
Dept: BEHAVIORAL/MENTAL HEALTH CLINIC | Facility: CLINIC | Age: 27
End: 2022-11-29

## 2022-11-29 DIAGNOSIS — F43.12 CHRONIC POST-TRAUMATIC STRESS DISORDER (PTSD): ICD-10-CM

## 2022-11-29 DIAGNOSIS — F41.0 GENERALIZED ANXIETY DISORDER WITH PANIC ATTACKS: ICD-10-CM

## 2022-11-29 DIAGNOSIS — F41.1 GENERALIZED ANXIETY DISORDER WITH PANIC ATTACKS: ICD-10-CM

## 2022-11-29 DIAGNOSIS — F31.4 BIPOLAR DISORDER, CURRENT EPISODE DEPRESSED, SEVERE, WITHOUT PSYCHOTIC FEATURES (HCC): Primary | ICD-10-CM

## 2022-11-29 DIAGNOSIS — F42.2 MIXED OBSESSIONAL THOUGHTS AND ACTS: ICD-10-CM

## 2022-11-29 NOTE — PSYCH
Virtual Regular Visit    Verification of patient location:    Patient is located in the following state in which I hold an active license [place]      Assessment/Plan:    Problem List Items Addressed This Visit        Other    Chronic post-traumatic stress disorder (PTSD)    Generalized anxiety disorder with panic attacks    OCD (obsessive compulsive disorder)    Bipolar affective disorder ([facility] [address] ) - Primary       Goals addressed in session: Goal 1, Goal 2 and Goal 3           Reason for visit is No chief complaint on file  Encounter provider Violeta Banuelos    Provider located at [address] [place] [id number] [phone]      Recent Visits  No visits were found meeting these conditions  Showing recent visits within past 7 days and meeting all other requirements  Today's Visits  Date Type Provider Dept   11/29/22 Telemedicine Violeta Banuelos [facility] Therapist Ivan   Showing today's visits and meeting all other requirements  Future Appointments  No visits were found meeting these conditions  Showing future appointments within next 150 days and meeting all other requirements       The patient was identified by name and date of birth  Aaron Watts was informed that this is a telemedicine visit and that the visit is being conducted [employer]  She agrees to proceed     My office door was closed  No one else was in the room  She acknowledged consent and understanding of privacy and security of the video platform  The patient has agreed to participate and understands they can discontinue the visit at any time  Patient is aware this is a billable service  Subjective  Aaron Watts is a 24 y o  female    D: Met with Yassine Alvarado individually    'I have a flair up - taking Prednisone and could be in DKA but doesn't want to go to ED '  Specific circumstances Yassine Alvarado emailed regarding family disagreements during week of Thanksgiving  Sister 'kicked out' Joaquin Hoskins of bridal party  Letter she wrote to her processed  Inez Peasant given an ultimatum and initially broke up with him  He states he will but Joaquin Hoskins feels 'aniket'  A: Joaquin Hoskins presented with stuttering, SOB and feels strongly she is in DKA - encouraged to go seek mom out to drive her to ED     P: Continue individual therapy      HPI     Past Medical History:   Diagnosis Date   • Abdominal pain    • Anxiety    • Anxiety and depression    • COVID-19 12/2020   • Depression    • Diabetes mellitus ([facility] [address] )    • Disease of thyroid gland     Hashimoto   • DKA, type 1 ([facility] [address] ) 5/11/2021   • Eating disorder     history of anorexia/bulemia [id number]   • Fracture of fibula     R Salter I   • Genevieve Blanco disease    • Hashimoto's disease 02/21/2020   • Head injury    • Headache(784 0)    • Nasal congestion    • PTSD (post-traumatic stress disorder)    • Rectal bleed    • Seizures ([facility] [address] )    • Type 1 diabetes ([facility] [address] )        Past Surgical History:   Procedure Laterality Date   • COLONOSCOPY     • KNEE SURGERY Right 07/06/2020   • NASAL SEPTOPLASTY W/ TURBINOPLASTY     • SINUS SURGERY     • SKIN BIOPSY     • TURBINOPLASTY N/A 3/22/2021    Procedure: Casandra Oconnor;  Surgeon: Sierra De Luna MD;  Location: [facility];  Service: ENT   • UPPER GASTROINTESTINAL ENDOSCOPY     • WISDOM TOOTH EXTRACTION     • WRIST SURGERY      left;  Excision of ganglion       Current Outpatient Medications   Medication Sig Dispense Refill   • calcium carbonate (TUMS) 500 mg chewable tablet Chew 1 tablet (500 mg total) daily (Patient not taking: No sig reported) 30 tablet 0   • clonazePAM (KlonoPIN) 0 5 mg tablet Take 1 tablet (0 5 mg total) by mouth 2 (two) times a day (Patient taking differently: Take 0 5 mg by mouth 2 (two) times a day as needed) 45 tablet 2   • Cyanocobalamin (VITAMIN B 12 PO) Take by mouth     • folic acid (FOLVITE) 1 mg tablet Take 1 tablet (1 mg total) by mouth daily 90 tablet 3   • gabapentin (Neurontin) 300 mg capsule Take 1 capsule (300 mg total) by mouth daily at bedtime 60 capsule 0   • glucagon 1 MG injection 1 mg (Patient not taking: Reported on 10/26/2022)     • hydroxychloroquine (PLAQUENIL) 200 mg tablet Take 1 tablet (200 mg total) by mouth 2 (two) times a day with meals 180 tablet 3   • insulin aspart (NovoLOG) 100 units/mL injection Inject  Units under the skin     • Insulin Pen Needle (BD PEN NEEDLE NASIM U/F) 32G X 4 MM MISC by Does not apply route 4 (four) times a day for 180 days 400 each 3   • levonorgestrel-ethinyl estradiol (Altavera) 0 15-30 MG-MCG per tablet Take 1 tablet by mouth daily 84 tablet 3   • levothyroxine 25 mcg tablet Take 1 tablet (25 mcg total) by mouth daily 60 tablet 0   • metFORMIN (GLUCOPHAGE-XR) 500 mg 24 hr tablet 1,000 mg 2 (two) times a day with meals       • methotrexate 2 5 mg tablet Take 8 tabs once weekly  96 tablet 1   • pantoprazole (PROTONIX) 40 mg tablet Take 1 tablet (40 mg total) by mouth in the morning  90 tablet 3   • polyethylene glycol (MIRALAX) 17 g packet Take 17 g by mouth daily as needed (constipation) 510 g 0   • promethazine (PHENERGAN) 12 5 MG tablet Take 1 tablet (12 5 mg total) by mouth every 6 (six) hours as needed for nausea or vomiting 30 tablet 0   • simethicone (MYLICON) 80 mg chewable tablet Chew 1 tablet (80 mg total) every 6 (six) hours as needed for flatulence 30 tablet 0   • sucralfate (CARAFATE) 1 g/10 mL suspension TAKE 10 ML (1 G TOTAL) BY MOUTH 2 (TWO) TIMES A  mL 0     No current facility-administered medications for this visit  Allergies   Allergen Reactions   • Insulin Glargine Other (See Comments)     LANTUS BRAND -Burning and redness under skin        Review of Systems    Video Exam    There were no vitals filed for this visit      Physical Exam     Visit Time  11/29/22  Start Time: 1700  Stop Time: 1253  Total Visit Time: 49 minutes

## 2022-12-05 ENCOUNTER — TELEPHONE (OUTPATIENT)
Dept: OBGYN CLINIC | Facility: CLINIC | Age: 27
End: 2022-12-05

## 2022-12-05 NOTE — TELEPHONE ENCOUNTER
Pt has not gotten her period in 5 wks - she was told to call Dr Delia Yates back - states she is not pregnant

## 2022-12-05 NOTE — TELEPHONE ENCOUNTER
Spoke to pt and she hasnt had a period- Aug 2018 or 2019  OV 10/22- reported some brown spotting/d/c for a month and a half in Sept     US was WNL and still no periods  UPT she takes monthly- last one taken a wk ago and neg      She wants to know if she can go back on [facility]

## 2022-12-09 ENCOUNTER — TELEPHONE (OUTPATIENT)
Dept: PSYCHIATRY | Facility: CLINIC | Age: 27
End: 2022-12-09

## 2022-12-09 NOTE — TELEPHONE ENCOUNTER
Patient is calling regarding cancelling an appointment  Date/Time: 12/28/2022 at 3:00 p m  Reason: pt is out of the state    Patient was rescheduled: YES [x] NO []  If yes, when was Patient reschedule for: 3/22/2023 at 3:00 p m      Patient requesting call back to reschedule: YES [] NO [x]    Pt was added to wait list

## 2022-12-09 NOTE — TELEPHONE ENCOUNTER
Patient is calling regarding cancelling an appointment  Date/Time: 12/13/2022 at 5:00 p m  Reason: pt is out of the state    Patient was rescheduled: YES [] NO [x]  If yes, when was Patient reschedule for:     Patient requesting call back to reschedule: YES [] NO [x]    Pt has 4 upcoming appts

## 2022-12-15 ENCOUNTER — TELEMEDICINE (OUTPATIENT)
Dept: FAMILY MEDICINE CLINIC | Facility: CLINIC | Age: 27
End: 2022-12-15

## 2022-12-15 DIAGNOSIS — U07.1 COVID-19: Primary | ICD-10-CM

## 2022-12-15 RX ORDER — GLUCAGON 1 MG
KIT INJECTION
COMMUNITY
Start: 2022-10-27

## 2022-12-15 NOTE — PROGRESS NOTES
COVID-19 Outpatient Progress Note    Assessment/Plan:    Problem List Items Addressed This Visit        Other    PHSVT-72 - Primary     Onset 12/9, positive 12/10, she was in [place] when she became sick so was unable to do a virtual visit  She was seen somewhere there but she was told antiviral was controversial and it was not prescribed  She just got home last night, says her sx are getting worse and worse, c/o cough, hoarseness, sputum production, ear pain, abd pain, n/v/d, unable to keep anything down, her sugars are high, temp goes up and down and she has chills  Taking nyquil, guaifenesin, nyquil  Denies sob  Pt advised to go to ER for further evaluation  Disposition:     I referred patient to the Emergency Department at: pt wants to go to [facility] due to road conditions and proximity to home        Discussed symptom directed medication options with patient  I have spent 15 minutes directly with the patient  Greater than 50% of this time was spent in counseling/coordination of care regarding: diagnostic results, prognosis, risks and benefits of treatment options, instructions for management, patient and family education, importance of treatment compliance, risk factor reductions and impressions  Encounter provider: MARGARET Carrasco     Provider located at: [address] [address]     Recent Visits  No visits were found meeting these conditions  Showing recent visits within past 7 days and meeting all other requirements  Today's Visits  Date Type Provider Dept   12/15/22 Telemedicine Yane Contreras, [facility]   Showing today's visits and meeting all other requirements  Future Appointments  No visits were found meeting these conditions    Showing future appointments within next 150 days and meeting all other requirements     This virtual check-in was done via [employer] [address] and patient was informed that this is a secure, HIPAA-compliant platform  She agrees to proceed  Patient agrees to participate in a virtual check in via telephone or video visit instead of presenting to the office to address urgent/immediate medical needs  Patient is aware this is a billable service  She acknowledged consent and understanding of privacy and security of the video platform  The patient has agreed to participate and understands they can discontinue the visit at any time  After connecting through [facility], the patient was identified by name and date of birth  Papa Mcnamara was informed that this was a telemedicine visit and that the exam was being conducted confidentially over secure lines  My office door was closed  No one else was in the room  Papa Mcnamara acknowledged consent and understanding of privacy and security of the telemedicine visit  I informed the patient that I have reviewed her record in Epic and presented the opportunity for her to ask any questions regarding the visit today  The patient agreed to participate  Verification of patient location:  Patient is located in the following state in which I hold an active license: [place]    Subjective:   Papa Mcnamara is a 24 y o  female who has been screened for COVID-19  Patient's symptoms include fever, chills, fatigue, nasal congestion, sore throat, cough, abdominal pain, nausea, vomiting, diarrhea, myalgias and headache  Patient denies malaise, rhinorrhea, anosmia, loss of taste, shortness of breath and chest tightness      - Date of symptom onset: 12/9/2022  - Date of positive COVID-19 test: 12/10/2022  Type of test: PCR  COVID-19 vaccination status: Fully vaccinated with booster    Lab Results   Component Value Date    SARSCOV2 Negative 08/05/2020    [address] [facility] Not Detected 06/04/2020    [address] Not Detected 11/13/2020       Review of Systems   Constitutional: Positive for chills, fatigue and fever  HENT: Positive for congestion and sore throat  Negative for rhinorrhea  Respiratory: Positive for cough  Negative for chest tightness and shortness of breath  Gastrointestinal: Positive for abdominal pain, diarrhea, nausea and vomiting  Musculoskeletal: Positive for myalgias  Neurological: Positive for headaches  Current Outpatient Medications on File Prior to Visit   Medication Sig   • Cyanocobalamin (VITAMIN B 12 PO) Take by mouth   • folic acid (FOLVITE) 1 mg tablet Take 1 tablet (1 mg total) by mouth daily   • gabapentin (Neurontin) 300 mg capsule Take 1 capsule (300 mg total) by mouth daily at bedtime   • Glucagon HCl (Glucagon Emergency) 1 MG/ML SOLR INJECT 1 MG AS DIRECTED AS NEEDED (WHEN PASSED OUT FROM LOW BLOOD SUGAR)     • insulin aspart (NovoLOG) 100 units/mL injection Inject  Units under the skin   • levonorgestrel-ethinyl estradiol (Altavera) 0 15-30 MG-MCG per tablet Take 1 tablet by mouth daily   • levothyroxine 25 mcg tablet Take 1 tablet (25 mcg total) by mouth daily   • metFORMIN (GLUCOPHAGE-XR) 500 mg 24 hr tablet 1,000 mg 2 (two) times a day with meals     • polyethylene glycol (MIRALAX) 17 g packet Take 17 g by mouth daily as needed (constipation)   • promethazine (PHENERGAN) 12 5 MG tablet Take 1 tablet (12 5 mg total) by mouth every 6 (six) hours as needed for nausea or vomiting   • simethicone (MYLICON) 80 mg chewable tablet Chew 1 tablet (80 mg total) every 6 (six) hours as needed for flatulence   • calcium carbonate (TUMS) 500 mg chewable tablet Chew 1 tablet (500 mg total) daily (Patient not taking: Reported on 10/19/2022)   • clonazePAM (KlonoPIN) 0 5 mg tablet Take 1 tablet (0 5 mg total) by mouth 2 (two) times a day (Patient not taking: Reported on 12/15/2022)   • glucagon 1 MG injection 1 mg (Patient not taking: Reported on 10/26/2022)   • hydroxychloroquine (PLAQUENIL) 200 mg tablet Take 1 tablet (200 mg total) by mouth 2 (two) times a day with meals (Patient not taking: Reported on 12/15/2022)   • Insulin Pen Needle (BD PEN NEEDLE NASIM U/F) 32G X 4 MM MISC by Does not apply route 4 (four) times a day for 180 days   • methotrexate 2 5 mg tablet Take 8 tabs once weekly  (Patient not taking: Reported on 12/15/2022)   • pantoprazole (PROTONIX) 40 mg tablet Take 1 tablet (40 mg total) by mouth in the morning  • sucralfate (CARAFATE) 1 g/10 mL suspension TAKE 10 ML (1 G TOTAL) BY MOUTH 2 (TWO) TIMES A DAY       Objective: There were no vitals taken for this visit  Physical Exam  Constitutional:       General: She is awake  She is not in acute distress  Appearance: Normal appearance  She is well-developed and well-groomed  She is ill-appearing  Comments: Very hoarse   Eyes:      General: Lids are normal       Conjunctiva/sclera: Conjunctivae normal    Pulmonary:      Effort: Pulmonary effort is normal  No tachypnea, accessory muscle usage or respiratory distress  Neurological:      Mental Status: She is alert and oriented to person, place, and time  Psychiatric:         Attention and Perception: Attention normal          Mood and Affect: Mood normal          Speech: Speech normal          Behavior: Behavior normal  Behavior is cooperative  Thought Content:  Thought content normal          Cognition and Memory: Cognition normal          Judgment: Judgment normal        MARGARET Pride

## 2022-12-15 NOTE — ASSESSMENT & PLAN NOTE
Onset 12/9, positive 12/10, she was in [place] when she became sick so was unable to do a virtual visit  She was seen somewhere there but she was told antiviral was controversial and it was not prescribed  She just got home last night, says her sx are getting worse and worse, c/o cough, hoarseness, sputum production, ear pain, abd pain, n/v/d, unable to keep anything down, her sugars are high, temp goes up and down and she has chills  Taking nyquil, guaifenesin, nyquil  Denies sob  Pt advised to go to ER for further evaluation

## 2023-01-06 ENCOUNTER — TELEPHONE (OUTPATIENT)
Dept: PSYCHIATRY | Facility: CLINIC | Age: 28
End: 2023-01-06

## 2023-01-06 NOTE — TELEPHONE ENCOUNTER
Pt called in to cancel upcoming appt 1/10/23 as she is having insurance issue pt stated plans to have issue resolved by next scheduled appt   1/24/23

## 2023-01-19 ENCOUNTER — TELEPHONE (OUTPATIENT)
Dept: PSYCHIATRY | Facility: CLINIC | Age: 28
End: 2023-01-19

## 2023-01-19 NOTE — TELEPHONE ENCOUNTER
Patient is calling regarding cancelling an appointment  Date/Time: 1/24 at 5 pm    Reason: insurance issues     Patient was rescheduled: YES [] NO [x]  If yes, when was Patient reschedule for:   has apt set for 2/7    Patient requesting call back to reschedule: YES [] NO [x]    Pt stated the insurance company has assured her that the SS and [facility] issues will be resolved by end of month

## 2023-02-02 ENCOUNTER — APPOINTMENT (OUTPATIENT)
Dept: LAB | Age: 28
End: 2023-02-02

## 2023-02-02 DIAGNOSIS — E10.65 TYPE 1 DIABETES MELLITUS WITH HYPERGLYCEMIA, WITH LONG-TERM CURRENT USE OF INSULIN (HCC): ICD-10-CM

## 2023-02-02 LAB
CHOLEST SERPL-MCNC: 200 MG/DL
EST. AVERAGE GLUCOSE BLD GHB EST-MCNC: 134 MG/DL
HBA1C MFR BLD: 6.3 %
HDLC SERPL-MCNC: 51 MG/DL
LDLC SERPL CALC-MCNC: 135 MG/DL (ref 0–100)
NONHDLC SERPL-MCNC: 149 MG/DL
TRIGL SERPL-MCNC: 72 MG/DL
TSH SERPL DL<=0.05 MIU/L-ACNC: 1.57 UIU/ML (ref 0.45–4.5)

## 2023-02-07 ENCOUNTER — SOCIAL WORK (OUTPATIENT)
Dept: BEHAVIORAL/MENTAL HEALTH CLINIC | Facility: CLINIC | Age: 28
End: 2023-02-07

## 2023-02-07 DIAGNOSIS — F41.0 GENERALIZED ANXIETY DISORDER WITH PANIC ATTACKS: ICD-10-CM

## 2023-02-07 DIAGNOSIS — F42.2 MIXED OBSESSIONAL THOUGHTS AND ACTS: Primary | ICD-10-CM

## 2023-02-07 DIAGNOSIS — F31.4 BIPOLAR DISORDER, CURRENT EPISODE DEPRESSED, SEVERE, WITHOUT PSYCHOTIC FEATURES (HCC): ICD-10-CM

## 2023-02-07 DIAGNOSIS — F41.1 GENERALIZED ANXIETY DISORDER WITH PANIC ATTACKS: ICD-10-CM

## 2023-02-07 DIAGNOSIS — F43.12 CHRONIC POST-TRAUMATIC STRESS DISORDER (PTSD): ICD-10-CM

## 2023-02-07 NOTE — PSYCH
Behavioral Health Psychotherapy Progress Note    Psychotherapy Provided: Individual Psychotherapy     1  Mixed obsessional thoughts and acts        2  Generalized anxiety disorder with panic attacks        3  Chronic post-traumatic stress disorder (PTSD)        4  Bipolar disorder, current episode depressed, severe, without psychotic features ([facility] [address] )            Goals addressed in session: Goal 1 and Goal 2     DATA:       Met with Jenny individually  Session focused upon multiple stressors influencing mood, anxiety 'through the roof '  Mood swings have been more frequent  Zeke Noe states she has experienced several manic episodes - bought a $3000 purse  Feels may have [employer] from Covid in Nov   Relationship with mEili Matos going very well  Leela with Chris's mother went pretty good  Experienced stomach issues over holiday - feels it might of been authentic Asian food  Work going well  Mood and health obstacles have been primary stressors  Denied SI      During this session, this clinician used the following therapeutic modalities: Client-centered Therapy, Cognitive Behavioral Therapy, Cognitive Processing Therapy and Supportive Psychotherapy    Substance Abuse was not addressed during this session  If the client is diagnosed with a co-occurring substance use disorder, please indicate any changes in the frequency or amount of use:   Stage of change for addressing substance use diagnoses: No substance use/Not applicable    ASSESSMENT:  Saintclair Dom presents with a Euthymic/ normal mood  her affect is Normal range and intensity, which is congruent, with her mood and the content of the session  The client has made progress on their goals  Saintclair Dom presents with a low risk of suicide, low risk of self-harm, and low risk of harm to others  For any risk assessment that surpasses a "low" rating, a safety plan must be developed      A safety plan was indicated: no  If yes, describe in detail PLAN: Between sessions, Saranya Tavares will continue to monitor mood fluctuations, communication with Melo Salas, attend Dr Mervat Jimenez    At the next session, the therapist will use Client-centered Therapy, Cognitive Behavioral Therapy, Cognitive Processing Therapy, Solution-Focused Therapy and Supportive Psychotherapy to address   Behavioral Health Treatment Plan and Discharge Planning: Saranya Tavares is aware of and agrees to continue to work on their treatment plan  They have identified and are working toward their discharge goals   yes    Visit start and stop times:    2/7/23  Start Time: 1700  Stop Time: 1750  Total Visit Time: 50 minutes

## 2023-02-08 ENCOUNTER — OFFICE VISIT (OUTPATIENT)
Dept: NEUROLOGY | Facility: CLINIC | Age: 28
End: 2023-02-08

## 2023-02-08 VITALS
WEIGHT: 136.8 LBS | DIASTOLIC BLOOD PRESSURE: 65 MMHG | SYSTOLIC BLOOD PRESSURE: 106 MMHG | HEART RATE: 84 BPM | BODY MASS INDEX: 25.83 KG/M2 | HEIGHT: 61 IN | TEMPERATURE: 98.2 F

## 2023-02-08 DIAGNOSIS — G62.9 NEUROPATHY: Primary | ICD-10-CM

## 2023-02-08 DIAGNOSIS — R56.9 SEIZURES (HCC): ICD-10-CM

## 2023-02-08 DIAGNOSIS — R20.0 LEFT LEG NUMBNESS: ICD-10-CM

## 2023-02-08 DIAGNOSIS — R53.1 WEAKNESS GENERALIZED: ICD-10-CM

## 2023-02-08 RX ORDER — GABAPENTIN 300 MG/1
300 CAPSULE ORAL
Qty: 90 CAPSULE | Refills: 3 | Status: SHIPPED | OUTPATIENT
Start: 2023-02-08

## 2023-02-08 NOTE — PROGRESS NOTES
Patient ID: Stefanie Bonilla is a 34 y o  female  Assessment/Plan:           Problem List Items Addressed This Visit        Nervous and Auditory    Neuropathy - Primary       Other    Weakness generalized    Relevant Medications    gabapentin (Neurontin) 300 mg capsule    Seizures (HCC)   Other Visit Diagnoses     Left leg numbness        Relevant Medications    gabapentin (Neurontin) 300 mg capsule         Ms Rodrigues has presented to [facility] multiple sclerosis center for follow-up on multiple neurological complaints  Since last office visit, patient has several medical regimen adjustments due to undifferentiated connective tissue disease flareup with steroid regimen in addition to increased dose of methotrexate been provided  The patient is feeling excellent today with residual sensory dysfunction involving her feet and weakness in her hands  Patient found gabapentin 300 mg at night been beneficial, patient requested refill  Patient has no new focal neurological deficits lasting 24 hours and longer to consider CNS involvement; patient does have small fiber neuropathy related to underlying rheumatological condition, no progression has been reported with preserved reflexes noted on today's exam     Patient has stable signs of Hashimoto thyroiditis as patient taking levothyroxine 25 mcg  Patient reports blurry vision intermittently but no double vision or vision loss described  Patient has been more motivated with good movements and excellent energy today, we agreed patient would not require any additional neurological evaluation, we may consider repeating MRIs if patient described new focal neurological deficit  We also discussed patient has excellent outcomes of brain and thoracic spine imaging completed in June 2021, with MRI of cervical spine completed in February 2021  Patient is to follow cervix neurology on annual basis       Subjective: visual disturbance-blurr vision, weakness-both hands and both arms, numbness-left lower leg and headaches    HPI  Ms Rodrigues has presented to [address] [place] [address] multiple [address] for follow-up on multiple neurologic complaints - patient previously described generalized body weakness/fatigue  Patient reports no new focal neurologic dysfunction with stable sensory dysfunction reported; patient has been reaching great control of underlying undifferentiated connective tissue disease - patient follows with rheumatology on scheduled bases  Patient has not had any seizure activity - single event took place many years ago due to hypoglycemia  Patient is reaching good control of her DM type I     No vision loss, no double vision described but blurry vision described  No significant headaches; The following portions of the patient's history were reviewed and updated as appropriate: She  has a past medical history of Abdominal pain, Anxiety, Anxiety and depression, COVID-19 (12/2020), Depression, Diabetes mellitus ([place] [address] ), Disease of thyroid gland, DKA, type 1 ([place] [address] ) (5/11/2021), Eating disorder, Fracture of fibula, Gilbert disease, Hashimoto's disease (02/21/2020), Head injury, Headache(784 0), Nasal congestion, PTSD (post-traumatic stress disorder), Rectal bleed, Seizures ([place] [address] ), and Type 1 diabetes ([place] [address] )    She   Patient Active Problem List    Diagnosis Date Noted   • Bruising 10/19/2022   • Constipation 09/28/2022   • Moderate protein-calorie malnutrition ([facility] ) 09/28/2022   • Gastroesophageal reflux disease 09/26/2022   • Other chest pain 02/22/2022   • Physical deconditioning 12/21/2021   • Right-sided back pain 12/21/2021   • Verruca 09/23/2021   • Nail abnormality 09/01/2021   • Primary hypothyroidism 08/18/2021   • Type 1 diabetes mellitus ([facility] ) 07/17/2021   • Elevated prolactin level 07/14/2021   • Fatty stools 06/29/2021   • Left low back pain 06/29/2021   • Hyperlipidemia 05/19/2021   • Visual hallucinations 05/11/2021   • Rheumatoid factor positive 04/29/2021   • History of anesthesia complications 13/67/5956   • Seizures (HCC)    • Positive SOHAM (antinuclear antibody) 03/09/2021   • Weakness generalized 02/11/2021   • Low serum vitamin B12 02/11/2021   • Arthralgia 02/11/2021   • Bipolar affective disorder ([facility] ) 01/13/2021   • COVID-19 12/31/2020   • 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   • Yeast infection 12/01/2020   • Vulvar cellulitis 11/04/2020   • Vulvovaginitis 11/03/2020   • Syncope    • Chronic back pain 10/16/2020   • Insulin pump status 09/30/2020   • Muscle weakness 09/03/2020   • Word finding difficulty 09/03/2020   • Neuropathy 06/23/2020   • Polyarthritis 06/23/2020   • Secondary amenorrhea 06/23/2020   • Vitamin D deficiency 06/19/2020   • Paresthesia of left leg 04/21/2020   • Hashimoto's disease 02/21/2020   • Dysuria 02/21/2020   • Amenorrhea 02/21/2020   • Visual changes 01/21/2020   • Chronic pain of right knee 10/15/2019   • Chronic abdominal pain 10/15/2019   • Blood in the stool 10/15/2019   • OCD (obsessive compulsive disorder) 09/26/2019   • Activity intolerance 09/10/2019   • Chronic post-traumatic stress disorder (PTSD) 08/12/2019   • Generalized anxiety disorder with panic attacks 08/12/2019   • Nausea and vomiting 08/06/2019   • Agitation 08/06/2019   • Concussion without loss of consciousness 07/02/2019   • Urinary frequency 06/06/2019   • Hypoglycemia 06/05/2019   • Abnormal stools 02/07/2019   • Uncontrolled type 1 diabetes mellitus with hypoglycemia without coma ([facility] ) 01/29/2019   • Abnormal liver function test 12/13/2018   • Right sided abdominal pain 12/13/2018   • Chronic fatigue and malaise 12/13/2018   • Patellofemoral pain syndrome of right knee 09/26/2018     She  has a past surgical history that includes Wrist surgery; [place] tooth extraction; Nasal septoplasty w/ turbinoplasty; Knee surgery (Right, 07/06/2020); Sinus surgery; Turbinoplasty (N/A, 3/22/2021); Colonoscopy; Upper gastrointestinal endoscopy; and Skin biopsy    Her family history includes Alcohol abuse in her brother and father; Anxiety disorder in her mother; Arthritis in her mother; Cancer in her family; Cholelithiasis in her father; Cirrhosis in her father; Coronary artery disease in her father; Depression in her mother; Diabetes in her family, father, and mother; Diabetes type II in her mother; Hashimoto's thyroiditis in her sister; Hearing loss in her mother; Hyperlipidemia in her father and mother; Hypertension in her family, father, and mother; Migraines in her mother; Nephrolithiasis in her father; Sarcoidosis in her father; Thyroid disease in her sister; Varicose Veins in her mother  She  reports that she has never smoked  She has never used smokeless tobacco  She reports that she does not currently use alcohol  She reports that she does not use drugs  Current Outpatient Medications   Medication Sig Dispense Refill   • clonazePAM (KlonoPIN) 0 5 mg tablet Take 1 tablet (0 5 mg total) by mouth 2 (two) times a day (Patient taking differently: Take 0 5 mg by mouth daily) 45 tablet 2   • folic acid (FOLVITE) 1 mg tablet Take 1 tablet (1 mg total) by mouth daily 90 tablet 3   • gabapentin (Neurontin) 300 mg capsule Take 1 capsule (300 mg total) by mouth daily at bedtime PRN 90 capsule 3   • Glucagon HCl (Glucagon Emergency) 1 MG/ML SOLR INJECT 1 MG AS DIRECTED AS NEEDED (WHEN PASSED OUT FROM LOW BLOOD SUGAR)  • hydroxychloroquine (PLAQUENIL) 200 mg tablet Take 1 tablet (200 mg total) by mouth 2 (two) times a day with meals 180 tablet 3   • insulin aspart (NovoLOG) 100 units/mL injection Inject  Units under the skin     • levonorgestrel-ethinyl estradiol (Altavera) 0 15-30 MG-MCG per tablet Take 1 tablet by mouth daily 84 tablet 3   • levothyroxine 25 mcg tablet Take 1 tablet (25 mcg total) by mouth daily 60 tablet 0   • metFORMIN (GLUCOPHAGE-XR) 500 mg 24 hr tablet 1,000 mg 2 (two) times a day with meals       • methotrexate 2 5 mg tablet Take 8 tabs once weekly   80 tablet 1   • promethazine (PHENERGAN) 12 5 MG tablet Take 1 tablet (12 5 mg total) by mouth every 6 (six) hours as needed for nausea or vomiting 30 tablet 0   • calcium carbonate (TUMS) 500 mg chewable tablet Chew 1 tablet (500 mg total) daily (Patient not taking: Reported on 10/19/2022) 30 tablet 0   • Cyanocobalamin (VITAMIN B 12 PO) Take by mouth (Patient not taking: Reported on 2/8/2023)     • glucagon 1 MG injection 1 mg (Patient not taking: Reported on 10/26/2022)     • Insulin Pen Needle (BD PEN NEEDLE NASIM U/F) 32G X 4 MM MISC by Does not apply route 4 (four) times a day for 180 days 400 each 3   • pantoprazole (PROTONIX) 40 mg tablet Take 1 tablet (40 mg total) by mouth in the morning  90 tablet 3   • polyethylene glycol (MIRALAX) 17 g packet Take 17 g by mouth daily as needed (constipation) (Patient not taking: Reported on 2/8/2023) 510 g 0   • simethicone (MYLICON) 80 mg chewable tablet Chew 1 tablet (80 mg total) every 6 (six) hours as needed for flatulence (Patient not taking: Reported on 2/8/2023) 30 tablet 0   • sucralfate (CARAFATE) 1 g/10 mL suspension TAKE 10 ML (1 G TOTAL) BY MOUTH 2 (TWO) TIMES A  mL 0     No current facility-administered medications for this visit  Current Outpatient Medications on File Prior to Visit   Medication Sig   • clonazePAM (KlonoPIN) 0 5 mg tablet Take 1 tablet (0 5 mg total) by mouth 2 (two) times a day (Patient taking differently: Take 0 5 mg by mouth daily)   • folic acid (FOLVITE) 1 mg tablet Take 1 tablet (1 mg total) by mouth daily   • Glucagon HCl (Glucagon Emergency) 1 MG/ML SOLR INJECT 1 MG AS DIRECTED AS NEEDED (WHEN PASSED OUT FROM LOW BLOOD SUGAR)     • hydroxychloroquine (PLAQUENIL) 200 mg tablet Take 1 tablet (200 mg total) by mouth 2 (two) times a day with meals   • insulin aspart (NovoLOG) 100 units/mL injection Inject  Units under the skin   • levonorgestrel-ethinyl estradiol (Altavera) 0 15-30 MG-MCG per tablet Take 1 tablet by mouth daily   • levothyroxine 25 mcg tablet Take 1 tablet (25 mcg total) by mouth daily   • metFORMIN (GLUCOPHAGE-XR) 500 mg 24 hr tablet 1,000 mg 2 (two) times a day with meals     • methotrexate 2 5 mg tablet Take 8 tabs once weekly  • promethazine (PHENERGAN) 12 5 MG tablet Take 1 tablet (12 5 mg total) by mouth every 6 (six) hours as needed for nausea or vomiting   • [DISCONTINUED] gabapentin (Neurontin) 300 mg capsule Take 1 capsule (300 mg total) by mouth daily at bedtime (Patient taking differently: Take 300 mg by mouth daily at bedtime PRN)   • calcium carbonate (TUMS) 500 mg chewable tablet Chew 1 tablet (500 mg total) daily (Patient not taking: Reported on 10/19/2022)   • Cyanocobalamin (VITAMIN B 12 PO) Take by mouth (Patient not taking: Reported on 2/8/2023)   • glucagon 1 MG injection 1 mg (Patient not taking: Reported on 10/26/2022)   • Insulin Pen Needle (BD PEN NEEDLE NASIM U/F) 32G X 4 MM MISC by Does not apply route 4 (four) times a day for 180 days   • pantoprazole (PROTONIX) 40 mg tablet Take 1 tablet (40 mg total) by mouth in the morning  • polyethylene glycol (MIRALAX) 17 g packet Take 17 g by mouth daily as needed (constipation) (Patient not taking: Reported on 2/8/2023)   • simethicone (MYLICON) 80 mg chewable tablet Chew 1 tablet (80 mg total) every 6 (six) hours as needed for flatulence (Patient not taking: Reported on 2/8/2023)   • sucralfate (CARAFATE) 1 g/10 mL suspension TAKE 10 ML (1 G TOTAL) BY MOUTH 2 (TWO) TIMES A DAY     No current facility-administered medications on file prior to visit  She is allergic to insulin glargine            Objective:    Blood pressure 106/65, pulse 84, temperature 98 2 °F (36 8 °C), temperature source Skin, height 5' 1" (1 549 m), weight 62 1 kg (136 lb 12 8 oz), not currently breastfeeding  Physical Exam    Neurological Exam  CONSTITUTIONAL: NAD, pleasant  NECK: supple, no lymphadenopathy, no thyromegaly, no JVD   CARDIOVASCULAR: RRR, normal S1S2, no murmurs, no rubs  RESP: clear to auscultation bilaterally, no wheezes/rhonchi/rales  ABDOMEN: soft, non tender, non distended  SKIN: no rash or skin lesions  EXTREMITIES: no edema, pulses 2+bilaterally  PSYCH: appropriate mood and affect  NEUROLOGIC COMPREHENSIVE EXAM: Patient is oriented to person, place and time, NAD; appropriate affect  CN II, III, IV, V, VI, VII,VIII,IX,X,XI-XII intact with EOMI, PERRLA, OKN intact, VF grossly intact, fundi poorly visualized secondary to pupillary constriction; symmetric face noted  Motor: 5/5 UE/LE bilateral symmetric; Sensory: intact to light touch and pinprick bilaterally; normal vibration sensation feet bilaterally; Coordination within normal limits on FTN and [place] testing; DTR: 2/4 through, no Babinski, no clonus  Tandem gait is intact  Romberg: absent  ROS:  12 points of review of system was reviewed with the patient and was unremarkable with exception: see HPI  Review of Systems   Constitutional: Negative  Negative for appetite change and fever  HENT: Negative  Negative for hearing loss, tinnitus, trouble swallowing and voice change  Eyes: Positive for visual disturbance (blurr vision)  Negative for photophobia and pain  Respiratory: Negative  Negative for shortness of breath  Cardiovascular: Negative  Negative for palpitations  Gastrointestinal: Negative  Negative for nausea and vomiting  Endocrine: Negative  Negative for cold intolerance  Genitourinary: Negative  Negative for dysuria, frequency and urgency  Musculoskeletal: Negative  Negative for gait problem, myalgias and neck pain  Skin: Negative  Negative for rash  Allergic/Immunologic: Negative  Neurological: Positive for weakness (both hands and both arms), numbness (Left lower leg) and headaches  Negative for dizziness, tremors, seizures, syncope, facial asymmetry, speech difficulty and light-headedness  Hematological: Negative  Does not bruise/bleed easily  Psychiatric/Behavioral: Negative  Negative for confusion, hallucinations and sleep disturbance

## 2023-02-17 ENCOUNTER — TELEPHONE (OUTPATIENT)
Dept: PSYCHIATRY | Facility: CLINIC | Age: 28
End: 2023-02-17

## 2023-02-22 ENCOUNTER — OFFICE VISIT (OUTPATIENT)
Dept: GASTROENTEROLOGY | Facility: AMBULARY SURGERY CENTER | Age: 28
End: 2023-02-22

## 2023-02-22 VITALS
DIASTOLIC BLOOD PRESSURE: 70 MMHG | OXYGEN SATURATION: 99 % | BODY MASS INDEX: 25.98 KG/M2 | SYSTOLIC BLOOD PRESSURE: 108 MMHG | HEART RATE: 90 BPM | HEIGHT: 61 IN | WEIGHT: 137.6 LBS

## 2023-02-22 DIAGNOSIS — K31.A0 INTESTINAL METAPLASIA OF STOMACH: ICD-10-CM

## 2023-02-22 DIAGNOSIS — R11.2 NAUSEA AND VOMITING, UNSPECIFIED VOMITING TYPE: ICD-10-CM

## 2023-02-22 DIAGNOSIS — K29.00 ACUTE SUPERFICIAL GASTRITIS WITHOUT HEMORRHAGE: ICD-10-CM

## 2023-02-22 DIAGNOSIS — K90.9 STEATORRHEA: ICD-10-CM

## 2023-02-22 DIAGNOSIS — R10.11 RUQ PAIN: Primary | ICD-10-CM

## 2023-02-22 RX ORDER — BLOOD SUGAR DIAGNOSTIC
STRIP MISCELLANEOUS
COMMUNITY
Start: 2023-02-06

## 2023-02-22 RX ORDER — PANTOPRAZOLE SODIUM 40 MG/1
40 TABLET, DELAYED RELEASE ORAL DAILY
Qty: 90 TABLET | Refills: 3 | Status: SHIPPED | OUTPATIENT
Start: 2023-02-22 | End: 2023-05-23

## 2023-02-22 RX ORDER — PEN NEEDLE, DIABETIC 29 G X1/2"
NEEDLE, DISPOSABLE MISCELLANEOUS
COMMUNITY
Start: 2023-01-16

## 2023-02-22 RX ORDER — DICYCLOMINE HYDROCHLORIDE 10 MG/1
10 CAPSULE ORAL 3 TIMES DAILY PRN
Qty: 90 CAPSULE | Refills: 0 | Status: SHIPPED | OUTPATIENT
Start: 2023-02-22

## 2023-02-22 RX ORDER — INSULIN ASPART 100 [IU]/ML
INJECTION, SOLUTION INTRAVENOUS; SUBCUTANEOUS
COMMUNITY
Start: 2022-12-19

## 2023-02-22 NOTE — PROGRESS NOTES
Follow-up Note -  [facility]  Claire Cinthia 1995 34 y o  female         Reason: Intermittent right upper quadrant pain, nausea and vomiting, steatorrhea    HPI: 32-year-old female with history of type 1 diabetes, anxiety/depression, Hashimoto's thyroiditis, undifferentiated connective tissue disease who presents for follow-up  She was last seen in our office with Dr Mirian Hagen 9/2022 with right sided abdominal pain, nausea and vomiting; was referred to the ER and actually found to be in DKA, and was admitted for 3 days  Last EGD 1/2022 showed mild gastritis in the prepyloric and pyloric regions, biopsies showing intestinal metaplasia but no H  pylori, was recommended for follow-up EGD in 1 to 2 years  She had normal gastric emptying study in 2021, normal UGI series 5/2022, normal RUQ ultrasound 9/2022, normal HIDA scan 10/2022  Previous celiac disease antibody testing negative  Normal colonoscopy in 10/2019  At this time, patient says she is still experiencing waves of problems with right upper quadrant pain radiating to the back, associated with nausea and often with vomiting  She says that she will have timeframes where she is generally okay and then others where she is worse, she says she was doing relatively well in November and December for example, but since January has been worse again  She says her mother recently had H  pylori  She says Phenergan has been helpful for nausea but makes her drowsy, she says she does not take Zofran because she was told it interacts with the Plaquenil  She says she has actually not been taking her pantoprazole for the last 1 to 2 months  She denies any use of NSAIDs, marijuana, alcohol or narcotics  She says that her stool consistency and frequency varies, but often notes stool that seems fatty and floats to the top of the toilet bowl  REVIEW OF SYSTEMS:      CONSTITUTIONAL: Denies any fever, chills, or rigors   Good appetite, and no recent weight loss  HEENT: No earache or tinnitus  Denies hearing loss or visual disturbances  CARDIOVASCULAR: No chest pain or palpitations  RESPIRATORY: Denies any cough, hemoptysis, shortness of breath or dyspnea on exertion  GASTROINTESTINAL: As noted in the History of Present Illness  GENITOURINARY: No problems with urination  Denies any hematuria or dysuria  NEUROLOGIC: No dizziness or vertigo, denies headaches  MUSCULOSKELETAL: Denies any muscle or joint pain  SKIN: Denies skin rashes or itching  ENDOCRINE: Denies excessive thirst  Denies intolerance to heat or cold  PSYCHOSOCIAL: Denies depression or anxiety  Denies any recent memory loss  Past Medical History:   Diagnosis Date   • Abdominal pain    • Anxiety    • Anxiety and depression    • COVID-19 12/2020   • Depression    • Diabetes mellitus ([facility] [address] )    • Disease of thyroid gland     Hashimoto   • D[facility], type [facility] ) 5/11/2021   • Eating disorder     history of anorexia/bulemia 2001-2829   • Fracture of fibula     R Salter I   • Allison Caro disease    • Hashimoto's disease 02/21/2020   • Head injury    • Headache(784 0)    • Nasal congestion    • PTSD (post-traumatic stress disorder)    • Rectal bleed    • Seizures (HCC)    • Type 1 diabetes (HCC)       Past Surgical History:   Procedure Laterality Date   • COLONOSCOPY     • KNEE SURGERY Right 07/06/2020   • NASAL SEPTOPLASTY W/ TURBINOPLASTY     • SINUS SURGERY     • SKIN BIOPSY     • TURBINOPLASTY N/A 3/22/2021    Procedure: Jacob Du;  Surgeon: Brennen Root MD;  Location: [facility];  Service: ENT   • UPPER GASTROINTESTINAL ENDOSCOPY     • WISDOM TOOTH EXTRACTION     • WRIST SURGERY      left;  Excision of ganglion     Social History     Socioeconomic History   • Marital status: Single     Spouse name: Not on file   • Number of children: 0   • Years of education: Not on file   • Highest education level: Associate degree: academic program   Occupational History   • Occupation: unemployed   Tobacco Use   • Smoking status: Never   • Smokeless tobacco: Never   • Tobacco comments:     Tobacco smoke exposure (Father smokes cigars)   Vaping Use   • Vaping Use: Never used   Substance and Sexual Activity   • Alcohol use: Not Currently   • Drug use: Never   • Sexual activity: Yes     Partners: Male     Birth control/protection: Condom Male, OCP   Other Topics Concern   • Not on file   Social History Narrative    Student at [address] a poor diet; low in vegetables, high in sweets    Dental care, regularly    Lives with parents    Sleeps 8-10 hours a day     Social Determinants of Health     Financial Resource Strain: Not on file   Food Insecurity: Not on file   Transportation Needs: Not on file   Physical Activity: Not on file   Stress: Not on file   Social Connections: Not on file   Intimate Partner Violence: Not on file   Housing Stability: Not on file     Family History   Problem Relation Age of Onset   • Hypertension Mother    • Migraines Mother         Headache   • Diabetes type II Mother    • Varicose Veins Mother    • Hyperlipidemia Mother    • Diabetes Mother    • Arthritis Mother    • Depression Mother    • Hearing loss Mother    • Anxiety disorder Mother    • Cholelithiasis Father    • Hypertension Father    • Sarcoidosis Father         Liver   • Hyperlipidemia Father    • Diabetes Father    • Coronary artery disease Father    • Nephrolithiasis Father    • Cirrhosis Father    • Alcohol abuse Father    • Thyroid disease Sister    • Hashimoto's thyroiditis Sister    • Cancer Family    • Diabetes Family    • Hypertension Family    • Alcohol abuse Brother      Insulin glargine  Current Outpatient Medications   Medication Sig Dispense Refill   • BD Insulin Syringe U/F 31G X 5/16" 0 5 ML MISC USE TO DRAW UP YOUR INSULIN     • clonazePAM (KlonoPIN) 0 5 mg tablet Take 1 tablet (0 5 mg total) by mouth 2 (two) times a day (Patient taking differently: Take 0 5 mg by mouth daily) 45 tablet 2 • Cyanocobalamin (VITAMIN B 12 PO) Take by mouth     • dicyclomine (BENTYL) 10 mg capsule Take 1 capsule (10 mg total) by mouth 3 (three) times a day as needed (bowel spasm) 90 capsule 0   • folic acid (FOLVITE) 1 mg tablet Take 1 tablet (1 mg total) by mouth daily 90 tablet 3   • gabapentin (Neurontin) 300 mg capsule Take 1 capsule (300 mg total) by mouth daily at bedtime PRN 90 capsule 3   • glucagon 1 MG injection 1 mg     • Glucagon HCl (Glucagon Emergency) 1 MG/ML SOLR INJECT 1 MG AS DIRECTED AS NEEDED (WHEN PASSED OUT FROM LOW BLOOD SUGAR)  • hydroxychloroquine (PLAQUENIL) 200 mg tablet Take 1 tablet (200 mg total) by mouth 2 (two) times a day with meals 180 tablet 3   • insulin aspart (NovoLOG) 100 units/mL injection Inject  Units under the skin     • levonorgestrel-ethinyl estradiol (Altavera) 0 15-30 MG-MCG per tablet Take 1 tablet by mouth daily 84 tablet 3   • levothyroxine 25 mcg tablet Take 1 tablet (25 mcg total) by mouth daily 60 tablet 0   • metFORMIN (GLUCOPHAGE-XR) 500 mg 24 hr tablet 1,000 mg 2 (two) times a day with meals       • methotrexate 2 5 mg tablet Take 8 tabs once weekly   96 tablet 1   • NovoLOG 100 UNIT/ML injection INJECT 3 TIMES A DAY WITH MEALS BASED ON ICR 4 AND ISF 30 FOR TDD 90 UNITS DAILY VIA INSULIN PUMP     • OneTouch Verio test strip USE 4 TIMES A DAY BEFORE MEALS AND AT BEDTIME     • pantoprazole (PROTONIX) 40 mg tablet Take 1 tablet (40 mg total) by mouth daily 90 tablet 3   • promethazine (PHENERGAN) 12 5 MG tablet Take 1 tablet (12 5 mg total) by mouth every 6 (six) hours as needed for nausea or vomiting 30 tablet 0   • calcium carbonate (TUMS) 500 mg chewable tablet Chew 1 tablet (500 mg total) daily (Patient not taking: Reported on 10/19/2022) 30 tablet 0   • Insulin Pen Needle (BD PEN NEEDLE NASIM U/F) 32G X 4 MM MISC by Does not apply route 4 (four) times a day for 180 days 400 each 3   • polyethylene glycol (MIRALAX) 17 g packet Take 17 g by mouth daily as needed (constipation) (Patient not taking: Reported on 2/8/2023) 510 g 0   • simethicone (MYLICON) 80 mg chewable tablet Chew 1 tablet (80 mg total) every 6 (six) hours as needed for flatulence (Patient not taking: Reported on 2/8/2023) 30 tablet 0   • sucralfate (CARAFATE) 1 g/10 mL suspension TAKE 10 ML (1 G TOTAL) BY MOUTH 2 (TWO) TIMES A  mL 0     No current facility-administered medications for this visit  Blood pressure 108/70, pulse 90, height 5' 1" (1 549 m), weight 62 4 kg (137 lb 9 6 oz), SpO2 99 %, not currently breastfeeding  PHYSICAL EXAM:      General Appearance:   Alert, cooperative, no distress, appears stated age    HEENT:   Normocephalic, atraumatic, anicteric      Neck:  Supple, symmetrical, trachea midline, no adenopathy;    thyroid: no enlargement/tenderness/nodules; no carotid  bruit or JVD    Lungs:   Clear to auscultation bilaterally; no rales, rhonchi or wheezing; respirations unlabored    Heart[de-identified]   S1 and S2 normal; regular rate and rhythm; no murmur, rub, or gallop  Abdomen:   Soft, non-tender, non-distended; normal bowel sounds; no masses, no organomegaly    Extremities: No edema, erythema, wounds, rashes   Rectal:   Deferred                      Lab Results   Component Value Date    WBC 5 58 09/29/2022    HGB 12 6 09/29/2022    HCT 38 0 09/29/2022    MCV 89 09/29/2022     09/29/2022     Lab Results   Component Value Date    GLUCOSE 342 (H) 01/24/2019    CALCIUM 8 8 09/29/2022    K 3 7 09/29/2022    CO2 22 09/29/2022     09/29/2022    BUN 14 09/29/2022    CREATININE 0 76 09/29/2022     Lab Results   Component Value Date    ALT 19 10/26/2022    AST 11 10/26/2022    GGT 10 06/26/2019    ALKPHOS 48 10/26/2022     Lab Results   Component Value Date    INR 0 99 10/26/2022    INR 1 00 10/22/2019    PROTIME 13 3 10/26/2022    PROTIME 12 6 10/22/2019       US pelvis complete w transvaginal    Result Date: 11/3/2022  Impression:  1    Trace fluid in the endometrial canal   Endometrial thickness is within limits  2   Ovaries are unremarkable  Workstation performed: [id number]       ASSESSMENT & PLAN:    Nausea and vomiting  Recurrent episodes of nausea, vomiting and right upper quadrant pain, patient has had extensive work-up including HIDA scan, ultrasound, is also had endoscopic evaluations although was noted with gastric intestinal metaplasia on her last EGD over a year ago in January 2022 for which she was recommended follow-up EGD in 1 to 2 years    As her symptoms have recently been more concerning, will be reasonable to reassess gastric mucosa for any interim development of peptic ulcer disease or gastritis, also consider H  pylori infection as she reports her mother recently had this    -Plan for EGD    -Procedure was explained in detail to the patient at this time including associated risks and benefits    -Advised patient resume her Protonix, which she has been off of for the last 1 to 2 months    -Before starting the Protonix, advised her to submit stool test including stool H  pylori antigen, will also check stool pancreatic elastase given her history of type 1 diabetes and her reports of steatorrhea    -Also suggested she can trial a soluble fiber supplement such as Citrucel to help with regular bowel movements    -Can also trial Bentyl 10 mg tid as needed for abdominal cramping in case this could be related to bowel spasm    - If EGD appears normal and symptoms persist, consider repeating gastric emptying study given her diabetes

## 2023-02-22 NOTE — PATIENT INSTRUCTIONS
Scheduled date of EGD(as of today):3/31/2023  Physician performing EGD:DR HIDALGO  Location of EGD:[facility]  Instructions reviewed with patient by:PALMA WHITNEY  Clearances:

## 2023-02-22 NOTE — ASSESSMENT & PLAN NOTE
Recurrent episodes of nausea, vomiting and right upper quadrant pain, patient has had extensive work-up including HIDA scan, ultrasound, is also had endoscopic evaluations although was noted with gastric intestinal metaplasia on her last EGD over a year ago in January 2022 for which she was recommended follow-up EGD in 1 to 2 years    As her symptoms have recently been more concerning, will be reasonable to reassess gastric mucosa for any interim development of peptic ulcer disease or gastritis, also consider H  pylori infection as she reports her mother recently had this    -Plan for EGD    -Procedure was explained in detail to the patient at this time including associated risks and benefits    -Advised patient resume her Protonix, which she has been off of for the last 1 to 2 months    -Before starting the Protonix, advised her to submit stool test including stool H  pylori antigen, will also check stool pancreatic elastase given her history of type 1 diabetes and her reports of steatorrhea    -Also suggested she can trial a soluble fiber supplement such as Citrucel to help with regular bowel movements    -Can also trial Bentyl 10 mg tid as needed for abdominal cramping in case this could be related to bowel spasm    - If EGD appears normal and symptoms persist, consider repeating gastric emptying study given her diabetes

## 2023-02-23 ENCOUNTER — TELEPHONE (OUTPATIENT)
Dept: GASTROENTEROLOGY | Facility: CLINIC | Age: 28
End: 2023-02-23

## 2023-02-27 ENCOUNTER — APPOINTMENT (OUTPATIENT)
Dept: LAB | Age: 28
End: 2023-02-27

## 2023-02-27 DIAGNOSIS — M35.9 UNDIFFERENTIATED CONNECTIVE TISSUE DISEASE (HCC): ICD-10-CM

## 2023-02-27 LAB
ALBUMIN SERPL BCP-MCNC: 4.4 G/DL (ref 3.5–5)
ALP SERPL-CCNC: 50 U/L (ref 46–116)
ALT SERPL W P-5'-P-CCNC: 16 U/L (ref 12–78)
ANION GAP SERPL CALCULATED.3IONS-SCNC: 4 MMOL/L (ref 4–13)
AST SERPL W P-5'-P-CCNC: 18 U/L (ref 5–45)
BACTERIA UR QL AUTO: ABNORMAL /HPF
BASOPHILS # BLD AUTO: 0.03 THOUSANDS/ÂΜL (ref 0–0.1)
BASOPHILS NFR BLD AUTO: 1 % (ref 0–1)
BILIRUB SERPL-MCNC: 0.84 MG/DL (ref 0.2–1)
BILIRUB UR QL STRIP: NEGATIVE
BUN SERPL-MCNC: 13 MG/DL (ref 5–25)
C3 SERPL-MCNC: 109 MG/DL (ref 90–180)
C4 SERPL-MCNC: 20 MG/DL (ref 10–40)
CALCIUM SERPL-MCNC: 9.8 MG/DL (ref 8.3–10.1)
CAOX CRY URNS QL MICRO: ABNORMAL /HPF
CHLORIDE SERPL-SCNC: 108 MMOL/L (ref 96–108)
CLARITY UR: ABNORMAL
CO2 SERPL-SCNC: 25 MMOL/L (ref 21–32)
COLOR UR: YELLOW
CREAT SERPL-MCNC: 0.94 MG/DL (ref 0.6–1.3)
CREAT UR-MCNC: 295 MG/DL
CRP SERPL QL: <3 MG/L
EOSINOPHIL # BLD AUTO: 0.08 THOUSAND/ÂΜL (ref 0–0.61)
EOSINOPHIL NFR BLD AUTO: 2 % (ref 0–6)
ERYTHROCYTE [DISTWIDTH] IN BLOOD BY AUTOMATED COUNT: 13.1 % (ref 11.6–15.1)
ERYTHROCYTE [SEDIMENTATION RATE] IN BLOOD: 12 MM/HOUR (ref 0–19)
GFR SERPL CREATININE-BSD FRML MDRD: 83 ML/MIN/1.73SQ M
GLUCOSE SERPL-MCNC: 84 MG/DL (ref 65–140)
GLUCOSE UR STRIP-MCNC: NEGATIVE MG/DL
HCT VFR BLD AUTO: 40.1 % (ref 34.8–46.1)
HGB BLD-MCNC: 13.4 G/DL (ref 11.5–15.4)
HGB UR QL STRIP.AUTO: NEGATIVE
IMM GRANULOCYTES # BLD AUTO: 0.01 THOUSAND/UL (ref 0–0.2)
IMM GRANULOCYTES NFR BLD AUTO: 0 % (ref 0–2)
KETONES UR STRIP-MCNC: NEGATIVE MG/DL
LEUKOCYTE ESTERASE UR QL STRIP: ABNORMAL
LYMPHOCYTES # BLD AUTO: 2.41 THOUSANDS/ÂΜL (ref 0.6–4.47)
LYMPHOCYTES NFR BLD AUTO: 44 % (ref 14–44)
MCH RBC QN AUTO: 30.2 PG (ref 26.8–34.3)
MCHC RBC AUTO-ENTMCNC: 33.4 G/DL (ref 31.4–37.4)
MCV RBC AUTO: 90 FL (ref 82–98)
MONOCYTES # BLD AUTO: 0.48 THOUSAND/ÂΜL (ref 0.17–1.22)
MONOCYTES NFR BLD AUTO: 9 % (ref 4–12)
MUCOUS THREADS UR QL AUTO: ABNORMAL
NEUTROPHILS # BLD AUTO: 2.49 THOUSANDS/ÂΜL (ref 1.85–7.62)
NEUTS SEG NFR BLD AUTO: 44 % (ref 43–75)
NITRITE UR QL STRIP: NEGATIVE
NON-SQ EPI CELLS URNS QL MICRO: ABNORMAL /HPF
NRBC BLD AUTO-RTO: 0 /100 WBCS
PH UR STRIP.AUTO: 7.5 [PH]
PLATELET # BLD AUTO: 222 THOUSANDS/UL (ref 149–390)
PMV BLD AUTO: 12.6 FL (ref 8.9–12.7)
POTASSIUM SERPL-SCNC: 3.8 MMOL/L (ref 3.5–5.3)
PROT SERPL-MCNC: 8 G/DL (ref 6.4–8.4)
PROT UR STRIP-MCNC: ABNORMAL MG/DL
PROT UR-MCNC: 11 MG/DL
PROT/CREAT UR: 0.04 MG/G{CREAT} (ref 0–0.1)
RBC # BLD AUTO: 4.44 MILLION/UL (ref 3.81–5.12)
RBC #/AREA URNS AUTO: ABNORMAL /HPF
SODIUM SERPL-SCNC: 137 MMOL/L (ref 135–147)
SP GR UR STRIP.AUTO: 1.03 (ref 1–1.03)
UROBILINOGEN UR STRIP-ACNC: 3 MG/DL
WBC # BLD AUTO: 5.5 THOUSAND/UL (ref 4.31–10.16)
WBC #/AREA URNS AUTO: ABNORMAL /HPF

## 2023-03-01 ENCOUNTER — APPOINTMENT (OUTPATIENT)
Dept: LAB | Age: 28
End: 2023-03-01

## 2023-03-01 DIAGNOSIS — R11.2 NAUSEA AND VOMITING, UNSPECIFIED VOMITING TYPE: ICD-10-CM

## 2023-03-01 DIAGNOSIS — K90.9 STEATORRHEA: ICD-10-CM

## 2023-03-01 DIAGNOSIS — K31.A0 INTESTINAL METAPLASIA OF STOMACH: ICD-10-CM

## 2023-03-01 DIAGNOSIS — K29.00 ACUTE SUPERFICIAL GASTRITIS WITHOUT HEMORRHAGE: ICD-10-CM

## 2023-03-01 DIAGNOSIS — R10.11 RUQ PAIN: ICD-10-CM

## 2023-03-01 LAB — DSDNA AB SER-ACNC: <1 IU/ML (ref 0–9)

## 2023-03-02 ENCOUNTER — OFFICE VISIT (OUTPATIENT)
Dept: RHEUMATOLOGY | Facility: CLINIC | Age: 28
End: 2023-03-02

## 2023-03-02 ENCOUNTER — TELEPHONE (OUTPATIENT)
Dept: RHEUMATOLOGY | Facility: CLINIC | Age: 28
End: 2023-03-02

## 2023-03-02 VITALS
HEIGHT: 61 IN | HEART RATE: 74 BPM | SYSTOLIC BLOOD PRESSURE: 114 MMHG | DIASTOLIC BLOOD PRESSURE: 78 MMHG | WEIGHT: 135.6 LBS | BODY MASS INDEX: 25.6 KG/M2

## 2023-03-02 DIAGNOSIS — Z79.631 METHOTREXATE, LONG TERM, CURRENT USE: ICD-10-CM

## 2023-03-02 DIAGNOSIS — Z79.899 LONG-TERM USE OF PLAQUENIL: ICD-10-CM

## 2023-03-02 DIAGNOSIS — E06.3 HASHIMOTO'S DISEASE: ICD-10-CM

## 2023-03-02 DIAGNOSIS — E10.9 TYPE 1 DIABETES MELLITUS WITHOUT COMPLICATION (HCC): ICD-10-CM

## 2023-03-02 DIAGNOSIS — M35.9 UNDIFFERENTIATED CONNECTIVE TISSUE DISEASE (HCC): Primary | ICD-10-CM

## 2023-03-02 NOTE — PROGRESS NOTES
Assessment and Plan:   Ms Rojelio Andrade a 62-year-old female with history significant for type 1 insulin-dependent diabetes mellitus diagnosed in 2019 and Hashimoto's thyroiditis diagnosed in 2020 who presents for a follow-up of an undifferentiated connective tissue disorder (diagnosed based on a positive SOHAM 1:80 nuclear, speckled, homogeneous patterns, arthralgias, malar rash/photosensitivity)  She is currently on hydroxychloroquine 200 mg twice daily and methotrexate 20 mg once weekly  # Undifferentiated connective tissue disease  - Calvin Groves presents today for a follow-up of an undifferentiated connective tissue disease for which she is currently on hydroxychloroquine 200 mg twice daily that was started in July 2021 and methotrexate 20 mg once weekly that was started in June 2022  She is doing well at this time and denies any significant concerns today  I recommend decreasing the hydroxychloroquine closer to weight-based dosing and will make an initial change of decreasing to 200 mg once daily on Saturday and Sunday  On the weekdays she will continue with 1 tablet twice daily  She is aware to follow-up with ophthalmology for annual eye exams  In regards to the oral methotrexate she would like to consider switching to the subcutaneous injections as at times she has difficulty swallowing pills and is also on multiple oral medications  I will start a prior authorization for subcutaneous methotrexate 20 mg once weekly and she will continue folic acid 1 mg daily  She will update her connective tissue disease lab monitoring prior to the follow-up visit  - She has also been dealing with recurrent infections which are highly prevalent at this time also in the immunocompetent population  I am agreeable to continue monitoring for a while longer but requested she inform me anytime she is dealing with an infection  The methotrexate can be temporarily suspended while she is recovering from an acute illness  If she continues to manifest with recurrent infections I may consider decreasing the dose of methotrexate     - She is getting  in January 2024 and would like to consider family-planning  She is aware that methotrexate is teratogenic and I recommend discontinuing it 3 months prior to conceiving  In the meanwhile she will continue with dual contraception including birth control pills and barrier contraception     - In regards to the chronic gastrointestinal issues I suspect less likely that this is related to a rheumatic etiology since she is overall doing well from a connective tissue disease perspective with normal serologies  I recommend she continue a workup through Gastroenterology         Plan:  Diagnoses and all orders for this visit:    Undifferentiated connective tissue disease ([facility] )  -     CBC and differential; Future  -     Comprehensive metabolic panel; Future  -     C-reactive protein; Future  -     Sedimentation rate, automated; Future  -     C4 complement; Future  -     C3 complement; Future  -     Anti-DNA antibody, double-stranded; Future  -     Urinalysis with microscopic  -     Protein / creatinine ratio, urine    Long-term use of Plaquenil    Methotrexate, long term, current use    Hashimoto's disease    Type 1 diabetes mellitus without complication ([place] [address] )      Activities as tolerated  Exercise: try to maintain a low impact exercise regimen as much as possible  Walk for 30 minutes a day for at least 3 days a week  Continue other medications as prescribed by PCP and other specialists  RTC in 6 months          HPI    INITIAL VISIT NOTE (6/2021):  Ms Ethan Chase a 74-year-old female with history significant for type 1 insulin-dependent diabetes mellitus diagnosed in 2019 and Hashimoto's thyroiditis (elevated thyroid microsomal and thyroglobulin antibodies) diagnosed in 2020, who presents for further evaluation of a positive SOHAM 1:80 nuclear, speckled, homogeneous patterns and rheumatoid factor of 10, in addition to widespread joint pains   She is referred by MARGARET Estrella for a rheumatology consult        Patient reports she started to feel unwell approximately 2 years ago and further evaluation for this led to the diagnosis of type 1 diabetes as well as the Hashimoto's thyroiditis  Alon Díaz has been managing the diabetes with insulin and states for the hypothyroidism as a result of Hashimoto's thyroiditis she was only started on levothyroxine 25 mcg once daily approximately 1 month ago  Patrizia Almaguer has been a conflict between the endocrinologists she has seen in regards to starting the levothyroxine, but due to progressive complaints which the patient and her primary care physician felt was related to hypothyroidism she was finally started on thyroid supplementation 1 month ago  Alon Díaz has not noticed a change in her symptoms so far and has not had a TSH rechecked yet   The TSH was slightly elevated at 5 24 on 5/1 prior to starting the levothyroxine   She is also scheduled to see a private endocrinologist for another opinion      She reports over the past 1 and half years she has also started to experience joint and muscle pain which has been gradually progressive   She experiences a degree of pain on a daily basis but states that her symptoms can spontaneously flare-up as well as with changes in the weather, especially when it is cold/damp/humid  Alon Díaz does feel better with the warmer weather and has also started utilizing a therapy pool which does help with her symptoms  Alon Díaz has noticed joint pains to affect her hands occasionally but prominently in her wrists   She will notice pain in her shoulders and hips mostly with manipulation and range of motion   She describes chronic pain that will also affect her knees as well as in her ankles and feet   At times she will notice a muscle pain throughout her upper and lower extremities as well   She has noticed muscle cramps to affect her hands and feet   No significant joint pains in her elbows or low back   She has not noticed any obvious swelling of her wrists but feels like they may be swollen as she can gauge this by her bracelet  Denisedimitri Mesa has also noticed swelling to affect her ankles   She does experience morning stiffness which affects her diffusely and takes about 30-40 minutes to improve   She tries to avoid Tylenol as this affects her blood glucose monitoring   She has tried ibuprofen for her symptoms which has not helped significantly      Other than the body pain she also describes chronic fatigue, unintentional weight gain and hair thinning   She denies fevers, unintentional weight loss, focal alopecia, dry eyes, dry mouth, inflammatory eye disease, skin rash, psoriasis, photosensitivity, mouth/nose ulcers, swollen glands, pleuritic chest pain, shortness of breath, inflammatory bowel disease, blood clots (reports a history of 1 very early miscarriage), Raynaud's or a family history of autoimmune disease      Testing done for her symptoms showed the positive SOHAM and borderline positive rheumatoid factor   A rheumatoid factor was negative in 2019   An ESR, CRP, anti CCP antibody, CK, Lyme antibody profile, anti neutrophilic cytoplasmic antibodies, Sjogren's antibodies and anti double-stranded DNA antibody were normal   An MRI of her right knee and ultrasound of her right Achilles tendon in 2018 were normal      In view of her complaints she was also evaluated by Neurology to rule out multiple sclerosis and currently there is no specific diagnosis from their perspective   She did have an MRI of her brain done last year which showed a single focus of white matter change which has been stable since 2014 7/13/2021:  Patient presents for a follow-up of a positive SOHAM    I reviewed her workup done following the last office visit which showed an unremarkable SOHAM specificity, C3, C4, antiphospholipid antibody testing, urinalysis, urine protein creatinine ratio, vitamin-D, CK, anti CCP antibody and HLA B27 antigen  X-rays of her hands and feet were unremarkable        She reports there has been no change in her symptoms since the last office visit and she continues to describe the chronic fatigue, muscle aches/spasms as well as ongoing joint pains  She is scheduled for another endocrinology opinion tomorrow to see if any of her symptoms may be related to the underlying hypothyroidism, but this is not felt to be the case so far       2/16/2022:  Patient presents for follow-up of an undifferentiated connective tissue disease  She is currently on hydroxychloroquine 200 mg twice daily that was started in July 2021  She reports in about September she started to notice a significant improvement in her well-being with being on the hydroxychloroquine  There was an improvement in her fatigue and joint pains  She had overall been doing well up until January but after she received the COVID-19 booster vaccination noticed a flare-up in her symptoms with more fatigue as well as significant joint pains which mostly affected her hands, wrists, knees and ankles  She has been noticing intermittent swelling of her fingers as well as ankles/feet  She has been taking Tylenol alternating with ibuprofen but is unsure if the medications are specifically helping her or if the side effects as a result of the vaccination are gradually improving on their own  She was also evaluated by Gastroenterology and had an upper endoscopy done which showed chronic gastritis so she was advised to minimize NSAID use  No recent steroids  Except for the pain she has also had a few occurrences of redness on her face in a butterfly pattern as well as affecting her forehead  She has noticed photosensitivity and states while she was at the beach in August she developed a skin rash in sun-exposed areas    She has been more careful with applying sunscreen as well as covering up in the sun   She denies fevers, weight loss, alopecia, mouth/nose ulcers, swollen glands or pleuritic chest pain  6/22/2022:  Patient presents for follow-up of an undifferentiated connective tissue disease  She is currently on hydroxychloroquine 200 mg twice daily that was started in July 2021  I reviewed her blood work from February which showed a normal CBC, CMP, ESR, CRP, C3, C4, double-stranded DNA antibody, urinalysis and urine protein creatinine ratio  She reports overall since the last office visit she has been fairly stable except for an episode of pericarditis following the COVID-19 booster vaccination in January  She was seen by Cardiology and prescribed colchicine to take for 3 months  She reports the symptoms have mostly resolved and only on occasion will she notice some chest pain which is not long-lasting  She reports with sun exposure she will start to notice more joint pains and fatigue  For the most part she deals with some degree of joint pain on a daily basis and still notices swelling affecting her hands and ankles  She will be starting occupational therapy as due to the joint pain in her hands she has started to feel weakness in her hand strength  No fevers, unintentional weight loss, skin rashes or mouth/nose ulcers  Although she still endorses symptoms she reports overall she has been feeling much better since starting the hydroxychloroquine  10/27/2022:  Patient presents for a follow-up of an undifferentiated connective disease  She is currently on hydroxychloroquine 200 mg twice daily that was started in July 2021 and oral methotrexate 15 mg once weekly that was started in June 2022  I reviewed her recent labs which showed an unremarkable CBC, CMP, ESR, CRP, C3, C4, double-stranded DNA antibody and urine protein creatinine ratio  She reports she has been doing well without any joint pains and the methotrexate really seems to have helped her    With initially starting it she did notice numbness in her hand and legs for which she was seen in the emergency department but as it was unclear if this was related to the methotrexate I requested she resume it and the symptoms have not been consistent  No skin rashes or mouth/nose ulcers  She does report chronic symptoms of abdominal pain, nausea, vomiting and greasy stools for which she has been following with Gastroenterology  A CT abdomen was unremarkable  She is scheduled for a HIDA scan  3/2/2023:  Patient presents for a follow-up of an undifferentiated connective disease  She is currently on hydroxychloroquine 200 mg twice daily that was started in July 2021 and oral methotrexate 20 mg once weekly that was started in June 2022  I reviewed her recent labs which showed an unremarkable CBC, CMP, ESR, CRP, C3, C4, double-stranded DNA antibody and urine protein creatinine ratio  She has been doing well since the last office visit and denies any complaints today  No symptoms such as true fevers, unintentional weight loss, alopecia, skin rashes, mouth/nose ulcers, swollen glands, pleuritic chest pain or joint pain/swelling/stiffness  She has been tolerating her medications well and is up-to-date with her annual eye exams but reports since December she has been dealing with recurrent infections  She took a while to recover from [place] and recently was diagnosed with back to back sinus infections requiring antibiotic use  The following portions of the patient's history were reviewed and updated as appropriate: allergies, current medications, past family history, past medical history, past social history, past surgical history and problem list       Review of Systems  Constitutional: Negative for weight change, fevers, chills, night sweats  ENT/Mouth: Negative for hearing changes, ear pain, nasal congestion, sinus pain, hoarseness, sore throat, rhinorrhea, swallowing difficulty     Eyes: Negative for pain, redness, discharge, vision changes  Cardiovascular: Negative for chest pain, SOB, palpitations  Respiratory: Negative for cough, sputum, wheezing, dyspnea  Gastrointestinal:  Positive for nausea, vomiting, diarrhea, pain  Genitourinary: Negative for dysuria, urinary frequency, hematuria  Musculoskeletal: As per HPI  Skin: Negative for skin rash, color changes  Neuro: Negative for weakness, numbness, tingling, loss of consciousness  Psych: Negative for anxiety, depression  Heme/Lymph: Negative for easy bruising, bleeding, lymphadenopathy  Past Medical History:   Diagnosis Date   • Abdominal pain    • Anxiety    • Anxiety and depression    • COVID-19 12/2020   • Depression    • Diabetes mellitus ([facility] )    • Disease of thyroid gland     Hashimoto   • DKA, type 1 ([facility] ) 5/11/2021   • Eating disorder     history of anorexia/bulemia [id number]   • Fracture of fibula     R Salter I   • Rich Men disease    • Hashimoto's disease 02/21/2020   • Head injury    • Headache(784 0)    • Nasal congestion    • PTSD (post-traumatic stress disorder)    • Rectal bleed    • Seizures (HCC)    • Type 1 diabetes (HCC)        Past Surgical History:   Procedure Laterality Date   • COLONOSCOPY     • KNEE SURGERY Right 07/06/2020   • NASAL SEPTOPLASTY W/ TURBINOPLASTY     • SINUS SURGERY     • SKIN BIOPSY     • TURBINOPLASTY N/A 3/22/2021    Procedure: Eddie Carroll;  Surgeon: Thalia Petersen MD;  Location: [facility];  Service: ENT   • UPPER GASTROINTESTINAL ENDOSCOPY     • WISDOM TOOTH EXTRACTION     • WRIST SURGERY      left;  Excision of ganglion       Social History     Socioeconomic History   • Marital status: Single     Spouse name: Not on file   • Number of children: 0   • Years of education: Not on file   • Highest education level: Associate degree: academic program   Occupational History   • Occupation: unemployed   Tobacco Use   • Smoking status: Never   • Smokeless tobacco: Never   • Tobacco comments:     Tobacco smoke exposure (Father smokes cigars)   Vaping Use   • Vaping Use: Never used   Substance and Sexual Activity   • Alcohol use: Not Currently   • Drug use: Never   • Sexual activity: Yes     Partners: Male     Birth control/protection: Condom Male, OCP   Other Topics Concern   • Not on file   Social History Narrative    Student at [address] a poor diet; low in vegetables, high in sweets    Dental care, regularly    Lives with parents    Sleeps 8-10 hours a day     Social Determinants of Health     Financial Resource Strain: Not on file   Food Insecurity: Not on file   Transportation Needs: Not on file   Physical Activity: Not on file   Stress: Not on file   Social Connections: Not on file   Intimate Partner Violence: Not on file   Housing Stability: Not on file       Family History   Problem Relation Age of Onset   • Hypertension Mother    • Migraines Mother         Headache   • Diabetes type II Mother    • Varicose Veins Mother    • Hyperlipidemia Mother    • Diabetes Mother    • Arthritis Mother    • Depression Mother    • Hearing loss Mother    • Anxiety disorder Mother    • Cholelithiasis Father    • Hypertension Father    • Sarcoidosis Father         Liver   • Hyperlipidemia Father    • Diabetes Father    • Coronary artery disease Father    • Nephrolithiasis Father    • Cirrhosis Father    • Alcohol abuse Father    • Thyroid disease Sister    • Hashimoto's thyroiditis Sister    • Cancer Family    • Diabetes Family    • Hypertension Family    • Alcohol abuse Brother        Allergies   Allergen Reactions   • Insulin Glargine Other (See Comments)     LANTUS BRAND -Burning and redness under skin        Current Outpatient Medications:   •  BD Insulin Syringe U/F 31G X 5/16" 0 5 ML MISC, USE TO DRAW UP YOUR INSULIN, Disp: , Rfl:   •  calcium carbonate (TUMS) 500 mg chewable tablet, Chew 1 tablet (500 mg total) daily (Patient not taking: Reported on 10/19/2022), Disp: 30 tablet, Rfl: 0  •  clonazePAM (KlonoPIN) 0 5 mg tablet, Take 1 tablet (0 5 mg total) by mouth 2 (two) times a day (Patient taking differently: Take 0 5 mg by mouth daily), Disp: 45 tablet, Rfl: 2  •  Cyanocobalamin (VITAMIN B 12 PO), Take by mouth, Disp: , Rfl:   •  dicyclomine (BENTYL) 10 mg capsule, Take 1 capsule (10 mg total) by mouth 3 (three) times a day as needed (bowel spasm), Disp: 90 capsule, Rfl: 0  •  folic acid (FOLVITE) 1 mg tablet, Take 1 tablet (1 mg total) by mouth daily, Disp: 90 tablet, Rfl: 3  •  gabapentin (Neurontin) 300 mg capsule, Take 1 capsule (300 mg total) by mouth daily at bedtime PRN, Disp: 90 capsule, Rfl: 3  •  glucagon 1 MG injection, 1 mg, Disp: , Rfl:   •  Glucagon HCl (Glucagon Emergency) 1 MG/ML SOLR, INJECT 1 MG AS DIRECTED AS NEEDED (WHEN PASSED OUT FROM LOW BLOOD SUGAR)  , Disp: , Rfl:   •  hydroxychloroquine (PLAQUENIL) 200 mg tablet, Take 1 tablet (200 mg total) by mouth 2 (two) times a day with meals, Disp: 180 tablet, Rfl: 3  •  insulin aspart (NovoLOG) 100 units/mL injection, Inject  Units under the skin, Disp: , Rfl:   •  Insulin Pen Needle (BD PEN NEEDLE NASIM U/F) 32G X 4 MM MISC, by Does not apply route 4 (four) times a day for 180 days, Disp: 400 each, Rfl: 3  •  levonorgestrel-ethinyl estradiol (Altavera) 0 15-30 MG-MCG per tablet, Take 1 tablet by mouth daily, Disp: 84 tablet, Rfl: 3  •  levothyroxine 25 mcg tablet, Take 1 tablet (25 mcg total) by mouth daily, Disp: 60 tablet, Rfl: 0  •  metFORMIN (GLUCOPHAGE-XR) 500 mg 24 hr tablet, 1,000 mg 2 (two) times a day with meals  , Disp: , Rfl:   •  methotrexate 2 5 mg tablet, Take 8 tabs once weekly  , Disp: 96 tablet, Rfl: 1  •  NovoLOG 100 UNIT/ML injection, INJECT 3 TIMES A DAY WITH MEALS BASED ON ICR 4 AND ISF 30 FOR TDD 90 UNITS DAILY VIA INSULIN PUMP, Disp: , Rfl:   •  OneTouch Verio test strip, USE 4 TIMES A DAY BEFORE MEALS AND AT BEDTIME, Disp: , Rfl:   •  pantoprazole (PROTONIX) 40 mg tablet, Take 1 tablet (40 mg total) by mouth daily, Disp: 90 tablet, Rfl: 3  •  polyethylene glycol (MIRALAX) 17 g packet, Take 17 g by mouth daily as needed (constipation) (Patient not taking: Reported on 2/8/2023), Disp: 510 g, Rfl: 0  •  promethazine (PHENERGAN) 12 5 MG tablet, Take 1 tablet (12 5 mg total) by mouth every 6 (six) hours as needed for nausea or vomiting, Disp: 30 tablet, Rfl: 0  •  simethicone (MYLICON) 80 mg chewable tablet, Chew 1 tablet (80 mg total) every 6 (six) hours as needed for flatulence (Patient not taking: Reported on 2/8/2023), Disp: 30 tablet, Rfl: 0  •  sucralfate (CARAFATE) 1 g/10 mL suspension, TAKE 10 ML (1 G TOTAL) BY MOUTH 2 (TWO) TIMES A DAY, Disp: 600 mL, Rfl: 0      Objective:    Vitals:    03/02/23 1509   BP: 114/78   BP Location: Left arm   Patient Position: Sitting   Cuff Size: Adult   Pulse: 74   Weight: 61 5 kg (135 lb 9 6 oz)   Height: 5' 1" (1 549 m)       Physical Exam  General: Well appearing, well nourished, in no distress  Oriented x 3, normal mood and affect  Ambulating without difficulty  Skin: Good turgor, no rash today, unusual bruising or prominent lesions  Hair: Normal texture and distribution  Nails: Normal color, no deformities  HEENT:  Head: Normocephalic, atraumatic  Eyes: Conjunctiva clear, sclera non-icteric, EOM intact  Extremities: No amputations or deformities, cyanosis, edema  Musculoskeletal:    There is no soft tissue swelling noted  Neurologic: Alert and oriented  No focal neurological deficits appreciated  Psychiatric: Normal mood and affect  DEVONTE Jain    Rheumatology

## 2023-03-02 NOTE — TELEPHONE ENCOUNTER
Please start prior authorization for subcutaneous methotrexate 20 mg once weekly for rheumatoid arthritis  She is currently on oral pills and cannot tolerate them well  She has previously been on hydroxychloroquine  Can initially try for the prefilled syringes and if required the vials

## 2023-03-03 LAB — H PYLORI AG STL QL IA: NEGATIVE

## 2023-03-03 RX ORDER — METHOTREXATE 25 MG/ML
20 INJECTION, SOLUTION INTRA-ARTERIAL; INTRAMUSCULAR; INTRAVENOUS WEEKLY
Qty: 4 ML | Refills: 5 | Status: SHIPPED | OUTPATIENT
Start: 2023-03-03

## 2023-03-03 NOTE — TELEPHONE ENCOUNTER
Methotrexate has been approved  [facility]

## 2023-03-06 RX ORDER — PEN NEEDLE, DIABETIC 29 G X1/2"
NEEDLE, DISPOSABLE MISCELLANEOUS
Qty: 100 EACH | Refills: 0 | Status: SHIPPED | OUTPATIENT
Start: 2023-03-06

## 2023-03-06 NOTE — TELEPHONE ENCOUNTER
Called and spoke to pt  Relayed message  Pt verbalized understanding  Pt requests syringe refill sent to pharmacy

## 2023-03-06 NOTE — TELEPHONE ENCOUNTER
Please let patient know it was approved and to stop oral MTX, also please tell her to contact the office if she has any issues, thanks

## 2023-03-07 ENCOUNTER — SOCIAL WORK (OUTPATIENT)
Dept: BEHAVIORAL/MENTAL HEALTH CLINIC | Facility: CLINIC | Age: 28
End: 2023-03-07

## 2023-03-07 DIAGNOSIS — F41.0 GENERALIZED ANXIETY DISORDER WITH PANIC ATTACKS: ICD-10-CM

## 2023-03-07 DIAGNOSIS — F41.1 GENERALIZED ANXIETY DISORDER WITH PANIC ATTACKS: ICD-10-CM

## 2023-03-07 DIAGNOSIS — F43.12 CHRONIC POST-TRAUMATIC STRESS DISORDER (PTSD): ICD-10-CM

## 2023-03-07 DIAGNOSIS — F42.2 MIXED OBSESSIONAL THOUGHTS AND ACTS: Primary | ICD-10-CM

## 2023-03-07 LAB — ELASTASE PANC STL-MCNT: 135 UG ELAST./G
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
## 2023-03-08 DIAGNOSIS — F41.0 GENERALIZED ANXIETY DISORDER WITH PANIC ATTACKS: ICD-10-CM

## 2023-03-08 DIAGNOSIS — R10.11 RUQ PAIN: ICD-10-CM

## 2023-03-08 DIAGNOSIS — K90.9 STEATORRHEA: Primary | ICD-10-CM

## 2023-03-08 DIAGNOSIS — F41.1 GENERALIZED ANXIETY DISORDER WITH PANIC ATTACKS: ICD-10-CM

## 2023-03-08 RX ORDER — CLONAZEPAM 0.5 MG/1
0.5 TABLET ORAL DAILY
Qty: 20 TABLET | Refills: 0 | Status: SHIPPED | OUTPATIENT
Start: 2023-03-08 | End: 2023-03-22 | Stop reason: SDUPTHER

## 2023-03-08 RX ORDER — DICYCLOMINE HYDROCHLORIDE 10 MG/1
CAPSULE ORAL
Qty: 270 CAPSULE | Refills: 1 | Status: SHIPPED | OUTPATIENT
Start: 2023-03-08

## 2023-03-21 ENCOUNTER — SOCIAL WORK (OUTPATIENT)
Dept: BEHAVIORAL/MENTAL HEALTH CLINIC | Facility: CLINIC | Age: 28
End: 2023-03-21

## 2023-03-21 DIAGNOSIS — F41.0 GENERALIZED ANXIETY DISORDER WITH PANIC ATTACKS: ICD-10-CM

## 2023-03-21 DIAGNOSIS — F43.12 CHRONIC POST-TRAUMATIC STRESS DISORDER (PTSD): ICD-10-CM

## 2023-03-21 DIAGNOSIS — F41.1 GENERALIZED ANXIETY DISORDER WITH PANIC ATTACKS: ICD-10-CM

## 2023-03-21 DIAGNOSIS — F31.4 BIPOLAR DISORDER, CURRENT EPISODE DEPRESSED, SEVERE, WITHOUT PSYCHOTIC FEATURES (HCC): ICD-10-CM

## 2023-03-21 DIAGNOSIS — F42.2 MIXED OBSESSIONAL THOUGHTS AND ACTS: Primary | ICD-10-CM
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
## 2023-03-22 ENCOUNTER — OFFICE VISIT (OUTPATIENT)
Dept: PSYCHIATRY | Facility: CLINIC | Age: 28
End: 2023-03-22

## 2023-03-22 ENCOUNTER — TELEPHONE (OUTPATIENT)
Dept: OTHER | Facility: OTHER | Age: 28
End: 2023-03-22

## 2023-03-22 DIAGNOSIS — F31.62 BIPOLAR DISORDER, CURRENT EPISODE MIXED, MODERATE (HCC): Primary | ICD-10-CM

## 2023-03-22 DIAGNOSIS — F41.0 GENERALIZED ANXIETY DISORDER WITH PANIC ATTACKS: ICD-10-CM

## 2023-03-22 DIAGNOSIS — F41.1 GENERALIZED ANXIETY DISORDER WITH PANIC ATTACKS: ICD-10-CM

## 2023-03-22 DIAGNOSIS — F43.12 CHRONIC POST-TRAUMATIC STRESS DISORDER (PTSD): ICD-10-CM

## 2023-03-22 PROBLEM — E10.65 TYPE 1 DIABETES MELLITUS WITH HYPERGLYCEMIA (HCC): Status: ACTIVE | Noted: 2021-07-17

## 2023-03-22 PROBLEM — E10.8 CONTROLLED DIABETES MELLITUS TYPE 1 WITH COMPLICATIONS (HCC): Status: ACTIVE | Noted: 2023-03-22

## 2023-03-22 RX ORDER — INSULIN DEGLUDEC INJECTION 100 U/ML
INJECTION, SOLUTION SUBCUTANEOUS
COMMUNITY
Start: 2023-03-12

## 2023-03-22 RX ORDER — INSULIN LISPRO 100 [IU]/ML
90 INJECTION, SOLUTION INTRAVENOUS; SUBCUTANEOUS DAILY
COMMUNITY
Start: 2023-03-08 | End: 2023-09-24

## 2023-03-22 RX ORDER — CLONAZEPAM 0.5 MG/1
0.5 TABLET ORAL DAILY
Qty: 30 TABLET | Refills: 2 | Status: SHIPPED | OUTPATIENT
Start: 2023-03-22

## 2023-03-22 NOTE — TELEPHONE ENCOUNTER
Spoke with patient, she is a type 1 diabetic and had some concerns with when to take creon medication  Some days she eats only two meals a day and multiple snacks throughout the day other times she eat only snacks for the day  She would like to know if she eats a snack to raise BS and takes creon medication and two hours late eats other snack should she take another pill?  She is also asking what the max amount of creon she can take in a day

## 2023-03-22 NOTE — TELEPHONE ENCOUNTER
Upon review of the In Basket request and the patient's chart, initial outreach has been made via fax, please see Contacts section for details       Thank you  Mitesh Cuadra 22-Mar-2023 12:46
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
## 2023-03-22 NOTE — TELEPHONE ENCOUNTER
Dose is weight based so she should have 3 capsules before each meal and 1 capsule before each snack  Maximum amount would be <10,000/kg/day so would be 610,000 per day or about 25 capsules, but would start with the dose that [facility] had prescribed

## 2023-03-22 NOTE — TELEPHONE ENCOUNTER
Patient has questions regarding her Creon and how and when she should take it amongst her other medications and meals  Please call to discuss

## 2023-03-23 ENCOUNTER — HOSPITAL ENCOUNTER (OUTPATIENT)
Dept: GASTROENTEROLOGY | Facility: AMBULARY SURGERY CENTER | Age: 28
Setting detail: OUTPATIENT SURGERY
End: 2023-03-23

## 2023-03-23 ENCOUNTER — ANESTHESIA EVENT (OUTPATIENT)
Dept: GASTROENTEROLOGY | Facility: AMBULARY SURGERY CENTER | Age: 28
End: 2023-03-23

## 2023-03-23 ENCOUNTER — ANESTHESIA (OUTPATIENT)
Dept: GASTROENTEROLOGY | Facility: AMBULARY SURGERY CENTER | Age: 28
End: 2023-03-23

## 2023-03-23 ENCOUNTER — HOSPITAL ENCOUNTER (EMERGENCY)
Facility: HOSPITAL | Age: 28
Discharge: HOME | End: 2023-03-23
Attending: EMERGENCY MEDICINE

## 2023-03-23 VITALS
SYSTOLIC BLOOD PRESSURE: 114 MMHG | BODY MASS INDEX: 25.49 KG/M2 | RESPIRATION RATE: 18 BRPM | WEIGHT: 135 LBS | HEART RATE: 96 BPM | TEMPERATURE: 97.6 F | DIASTOLIC BLOOD PRESSURE: 59 MMHG | HEIGHT: 61 IN | OXYGEN SATURATION: 98 %

## 2023-03-23 VITALS
HEART RATE: 76 BPM | TEMPERATURE: 98.7 F | RESPIRATION RATE: 15 BRPM | WEIGHT: 148.37 LBS | OXYGEN SATURATION: 98 % | BODY MASS INDEX: 28.03 KG/M2 | DIASTOLIC BLOOD PRESSURE: 54 MMHG | SYSTOLIC BLOOD PRESSURE: 88 MMHG

## 2023-03-23 DIAGNOSIS — T88.59XA COMPLICATION OF ANESTHESIA, INITIAL ENCOUNTER: Primary | ICD-10-CM

## 2023-03-23 DIAGNOSIS — R11.2 NAUSEA AND VOMITING, UNSPECIFIED VOMITING TYPE: ICD-10-CM

## 2023-03-23 DIAGNOSIS — R10.11 RUQ PAIN: ICD-10-CM

## 2023-03-23 DIAGNOSIS — K31.A0 INTESTINAL METAPLASIA OF STOMACH: ICD-10-CM

## 2023-03-23 LAB
EXT PREGNANCY TEST URINE: NEGATIVE
EXT. CONTROL: NORMAL
GLUCOSE SERPL-MCNC: 107 MG/DL (ref 65–140)
GLUCOSE SERPL-MCNC: 131 MG/DL (ref 65–140)

## 2023-03-23 RX ORDER — LIDOCAINE HYDROCHLORIDE 20 MG/ML
INJECTION, SOLUTION INFILTRATION; PERINEURAL AS NEEDED
Status: DISCONTINUED | OUTPATIENT
Start: 2023-03-23 | End: 2023-03-23

## 2023-03-23 RX ORDER — PROPOFOL 10 MG/ML
INJECTION, EMULSION INTRAVENOUS AS NEEDED
Status: DISCONTINUED | OUTPATIENT
Start: 2023-03-23 | End: 2023-03-23

## 2023-03-23 RX ORDER — MIDAZOLAM HYDROCHLORIDE 2 MG/2ML
INJECTION, SOLUTION INTRAMUSCULAR; INTRAVENOUS AS NEEDED
Status: DISCONTINUED | OUTPATIENT
Start: 2023-03-23 | End: 2023-03-23

## 2023-03-23 RX ORDER — FENTANYL CITRATE 50 UG/ML
INJECTION, SOLUTION INTRAMUSCULAR; INTRAVENOUS AS NEEDED
Status: DISCONTINUED | OUTPATIENT
Start: 2023-03-23 | End: 2023-03-23

## 2023-03-23 RX ORDER — SODIUM CHLORIDE, SODIUM LACTATE, POTASSIUM CHLORIDE, CALCIUM CHLORIDE 600; 310; 30; 20 MG/100ML; MG/100ML; MG/100ML; MG/100ML
INJECTION, SOLUTION INTRAVENOUS CONTINUOUS PRN
Status: DISCONTINUED | OUTPATIENT
Start: 2023-03-23 | End: 2023-03-23

## 2023-03-23 RX ADMIN — FENTANYL CITRATE 50 MCG: 50 INJECTION INTRAMUSCULAR; INTRAVENOUS at 14:26

## 2023-03-23 RX ADMIN — MIDAZOLAM 2 MG: 1 INJECTION INTRAMUSCULAR; INTRAVENOUS at 14:25

## 2023-03-23 RX ADMIN — SODIUM CHLORIDE 1000 ML: 0.9 INJECTION, SOLUTION INTRAVENOUS at 17:41

## 2023-03-23 RX ADMIN — PROPOFOL 100 MG: 10 INJECTION, EMULSION INTRAVENOUS at 14:29

## 2023-03-23 RX ADMIN — LIDOCAINE HYDROCHLORIDE 5 ML: 20 INJECTION, SOLUTION EPIDURAL; INFILTRATION; INTRACAUDAL at 14:29

## 2023-03-23 RX ADMIN — SODIUM CHLORIDE, SODIUM LACTATE, POTASSIUM CHLORIDE, AND CALCIUM CHLORIDE: .6; .31; .03; .02 INJECTION, SOLUTION INTRAVENOUS at 14:28

## 2023-03-23 RX ADMIN — PROPOFOL 30 MG: 10 INJECTION, EMULSION INTRAVENOUS at 14:30

## 2023-03-23 NOTE — ED PROVIDER NOTES
History  Chief Complaint   Patient presents with   • Post-op Problem     [facility]  Per EMS, pt had episode of post anesthesia delirium  Pt was thrashing and re sedated  Pt given haldol, versed, precedex     Carson Lozada is a 45-year-old female here after EGD post anesthesia emergence reaction causing thrashing and needing the sedation  Patient has a history of emergence reaction and after getting a EGD today she was uncooperative and thrashing about and needed resedation for which she was given 5 mg Haldol, 2 mg Versed, 80 mcg Precedex  On arrival to the emergency department patient is sedated and unresponsive, vitals are stable satting 100% on 2 L nasal cannula  Prior to Admission Medications   Prescriptions Last Dose Informant Patient Reported? Taking? BD Insulin Syringe U/F 31G X 5/16" 0 5 ML MISC   No No   Sig: Use as directly with weekly methotrexate injection  Cyanocobalamin (VITAMIN B 12 PO)   Yes No   Sig: Take by mouth   Glucagon HCl (Glucagon Emergency) 1 MG/ML SOLR   Yes No   Sig: INJECT 1 MG AS DIRECTED AS NEEDED (WHEN PASSED OUT FROM LOW BLOOD SUGAR)     Insulin Pen Needle (BD PEN NEEDLE NASIM U/F) 32G X 4 MM MISC   No No   Sig: by Does not apply route 4 (four) times a day for 180 days   NovoLOG 100 UNIT/ML injection   Yes No   Sig: INJECT 3 TIMES A DAY WITH MEALS BASED ON ICR 4 AND ISF 30 FOR TDD 90 UNITS DAILY VIA INSULIN PUMP   OneTouch Verio test strip   Yes No   Sig: USE 4 TIMES A DAY BEFORE MEALS AND AT BEDTIME   Tresiba FlexTouch 100 units/mL injection pen   Yes No   Sig: INJECT 34 UNITS DAILY IN THE EVENT OF PUMP FAILURE E10 65   clonazePAM (KlonoPIN) 0 5 mg tablet   No No   Sig: Take 1 tablet (0 5 mg total) by mouth daily   dicyclomine (BENTYL) 10 mg capsule   No No   Sig: TAKE 1 CAPSULE BY MOUTH 3 TIMES A DAY AS NEEDED (BOWEL SPASM)   folic acid (FOLVITE) 1 mg tablet   No No   Sig: Take 1 tablet (1 mg total) by mouth daily   gabapentin (Neurontin) 300 mg capsule   No No   Sig: Take 1 capsule (300 mg total) by mouth daily at bedtime PRN   glucagon 1 MG injection   Yes No   Si mg   hydroxychloroquine (PLAQUENIL) 200 mg tablet   No No   Sig: Take 1 tablet (200 mg total) by mouth 2 (two) times a day with meals   insulin aspart (NovoLOG) 100 units/mL injection   Yes No   Sig: Inject  Units under the skin   insulin lispro (HumaLOG) 100 units/mL injection   Yes No   Sig: Inject 90 Units under the skin daily   levonorgestrel-ethinyl estradiol (Altavera) 0 15-30 MG-MCG per tablet   No No   Sig: Take 1 tablet by mouth daily   levothyroxine 25 mcg tablet   No No   Sig: Take 1 tablet (25 mcg total) by mouth daily   metFORMIN (GLUCOPHAGE-XR) 500 mg 24 hr tablet   Yes No   Si,000 mg 2 (two) times a day with meals     methotrexate 50 MG/2ML injection   No No   Sig: Inject 0 8 mL (20 mg total) under the skin once a week   pancrelipase, Lip-Prot-Amyl, (CREON) 12,000 units capsule   No No   Sig: Take 36,000 units of lipase by mouth 3 (three) times a day with meals Take 3 capsules prior to each meal and 1 prior to snack   pantoprazole (PROTONIX) 40 mg tablet   No No   Sig: Take 1 tablet (40 mg total) by mouth daily   promethazine (PHENERGAN) 12 5 MG tablet   No No   Sig: Take 1 tablet (12 5 mg total) by mouth every 6 (six) hours as needed for nausea or vomiting   sertraline (Zoloft) 50 mg tablet   No No   Sig: Take 1 tablet (50 mg total) by mouth daily      Facility-Administered Medications: None       Past Medical History:   Diagnosis Date   • Abdominal pain    • Anxiety    • Anxiety and depression    • COVID-19 2020   • Delayed emergence from anesthesia 2023    Severe episode of emergence delirium (confused, combative) after MAC anesthetic for EGD  Received versed 2mg, >50mcg precedex, 5mg IV haldol, and 50mcg fentanyl  Waxing and waning episodes of agitation  Any future anesthetics should NOT be done at [facility]     • Delirium, induced by drug anesthesia   • Depression    • Diabetes mellitus ([facility] [address] )    • Disease of thyroid gland     Hashimoto   • [facility] 05/11/2021   • Eating disorder     history of anorexia/bulemia [id number]   • Fracture of fibula     R Salter I   • Saundra Verito disease    • Hashimoto's disease 02/21/2020   • Head injury    • Headache(784 0)    • Nasal congestion    • PTSD (post-traumatic stress disorder)    • Rectal bleed    • Seizures (HCC)    • Type 1 diabetes (HCC)        Past Surgical History:   Procedure Laterality Date   • COLONOSCOPY     • KNEE SURGERY Right 07/06/2020   • NASAL SEPTOPLASTY W/ TURBINOPLASTY     • SINUS SURGERY     • SKIN BIOPSY     • TURBINOPLASTY N/A 3/22/2021    Procedure: Basilio Fuentes;  Surgeon: Nury Qureshi MD;  [facility];  Service: ENT   • UPPER GASTROINTESTINAL ENDOSCOPY     • WISDOM TOOTH EXTRACTION     • WRIST SURGERY      left; Excision of ganglion       Family History   Problem Relation Age of Onset   • Hypertension Mother    • Migraines Mother         Headache   • Diabetes type II Mother    • Varicose Veins Mother    • Hyperlipidemia Mother    • Diabetes Mother    • Arthritis Mother    • Depression Mother    • Hearing loss Mother    • Anxiety disorder Mother    • Cholelithiasis Father    • Hypertension Father    • Sarcoidosis Father         Liver   • Hyperlipidemia Father    • Diabetes Father    • Coronary artery disease Father    • Nephrolithiasis Father    • Cirrhosis Father    • Alcohol abuse Father    • Thyroid disease Sister    • Hashimoto's thyroiditis Sister    • Cancer Family    • Diabetes Family    • Hypertension Family    • Alcohol abuse Brother      I have reviewed and agree with the history as documented      E-Cigarette/Vaping   • E-Cigarette Use Never User      E-Cigarette/Vaping Substances   • Nicotine No    • THC No    • CBD No    • Flavoring No    • Other No    • Unknown No      Social History     Tobacco Use   • Smoking status: Never   • Smokeless tobacco: Never • Tobacco comments:     Tobacco smoke exposure (Father smokes cigars)   Vaping Use   • Vaping Use: Never used   Substance Use Topics   • Alcohol use: Not Currently   • Drug use: Never        Review of Systems   Unable to perform ROS: Mental status change       Physical Exam  ED Triage Vitals [03/23/23 1543]   Temperature Pulse Respirations Blood Pressure SpO2   98 7 °F (37 1 °C) 84 16 100/55 100 %      Temp Source Heart Rate Source Patient Position - Orthostatic VS BP Location FiO2 (%)   Oral Monitor Lying Right arm --      Pain Score       No Pain             Orthostatic Vital Signs  Vitals:    03/23/23 1543   BP: 100/55   Pulse: 84   Patient Position - Orthostatic VS: Lying       Physical Exam  Vitals and nursing note reviewed  Constitutional:       General: She is not in acute distress  Appearance: She is well-developed  Comments: sedated   HENT:      Head: Normocephalic and atraumatic  Nose: Nose normal  No congestion  Eyes:      Extraocular Movements: Extraocular movements intact  Conjunctiva/sclera: Conjunctivae normal       Pupils: Pupils are equal, round, and reactive to light  Cardiovascular:      Rate and Rhythm: Normal rate and regular rhythm  Pulses: Normal pulses  Heart sounds: Normal heart sounds  No murmur heard  Pulmonary:      Effort: Pulmonary effort is normal  No respiratory distress  Breath sounds: Normal breath sounds  Abdominal:      General: Abdomen is flat  Palpations: Abdomen is soft  Musculoskeletal:         General: No deformity or signs of injury  Skin:     General: Skin is warm and dry  Findings: No bruising, lesion or rash           ED Medications  Medications - No data to display    Diagnostic Studies  Results Reviewed     None                 No orders to display         Procedures  Procedures      ED Course                                       MDM      Disposition  Final diagnoses:   None     ED Disposition     None Follow-up Information    None         Patient's Medications   Discharge Prescriptions    No medications on file     No discharge procedures on file  PDMP Review       Value Time User    PDMP Reviewed  Yes 7/9/2021  3:50 AM Jessy Wise MD           ED Provider  Attending physically available and evaluated [facility]  I managed the patient along with the ED Attending      Electronically Signed by "  Discharge    Condition   Stable    Date/Time   Thu Mar 23, 2023  7:07 PM    Comment   Matthew Hyde discharge to home/self care  Follow-up Information     Follow up With Specialties Details Why Contact Info Additional [facility], Nurse Practitioner, Family Medicine   [address] [facility] [address]  [address] [facility] Emergency Department Emergency Medicine   [address] Emergency Department, [address], [place], [place], [address]          Discharge Medication List as of 3/23/2023  7:11 PM      CONTINUE these medications which have NOT CHANGED    Details   BD Insulin Syringe U/F 31G X 5/16\" 0 5 ML MISC Use as directly with weekly methotrexate injection  , Normal      clonazePAM (KlonoPIN) 0 5 mg tablet Take 1 tablet (0 5 mg total) by mouth daily, Starting Wed 3/22/2023, Normal      Cyanocobalamin (VITAMIN B 12 PO) Take by mouth, Historical Med      folic acid (FOLVITE) 1 mg tablet Take 1 tablet (1 mg total) by mouth daily, Starting Mon 11/21/2022, Normal      gabapentin (Neurontin) 300 mg capsule Take 1 capsule (300 mg total) by mouth daily at bedtime PRN, Starting Wed 2/8/2023, Normal      hydroxychloroquine (PLAQUENIL) 200 mg tablet Take 1 tablet (200 mg total) by mouth 2 (two) times a day with meals, Starting Thu 10/27/2022, Until Sun 10/22/2023, Normal      insulin aspart (NovoLOG) 100 units/mL injection Inject  Units under the skin, Historical Med      levonorgestrel-ethinyl estradiol (Altavera) 0 15-30 MG-MCG per tablet Take 1 tablet by mouth daily, Starting Sun 8/7/2022, Normal      levothyroxine 25 mcg tablet Take 1 tablet (25 mcg total) by mouth daily, Starting Wed 5/19/2021, Normal      metFORMIN (GLUCOPHAGE-XR) 500 mg 24 hr tablet 1,000 mg 2 (two) times a day with meals  , Starting Wed 9/22/2021, " Historical Med      methotrexate 50 MG/2ML injection Inject 0 8 mL (20 mg total) under the skin once a week, Starting Fri 3/3/2023, Normal      NovoLOG 100 UNIT/ML injection INJECT 3 TIMES A DAY WITH MEALS BASED ON ICR 4 AND ISF 30 FOR TDD 90 UNITS DAILY VIA INSULIN PUMP, Historical Med      OneTouch Verio test strip USE 4 TIMES A DAY BEFORE MEALS AND AT BEDTIME, Historical Med      pantoprazole (PROTONIX) 40 mg tablet Take 1 tablet (40 mg total) by mouth daily, Starting Wed 2/22/2023, Until Tue 5/23/2023, Normal      promethazine (PHENERGAN) 12 5 MG tablet Take 1 tablet (12 5 mg total) by mouth every 6 (six) hours as needed for nausea or vomiting, Starting Mon 9/26/2022, Normal      Tresiba FlexTouch 100 units/mL injection pen INJECT 34 UNITS DAILY IN THE EVENT OF PUMP FAILURE E10 65, Historical Med      dicyclomine (BENTYL) 10 mg capsule TAKE 1 CAPSULE BY MOUTH 3 TIMES A DAY AS NEEDED (BOWEL SPASM), Normal      glucagon 1 MG injection 1 mg, Starting Wed 10/12/2022, Historical Med      Glucagon HCl (Glucagon Emergency) 1 MG/ML SOLR INJECT 1 MG AS DIRECTED AS NEEDED (WHEN PASSED OUT FROM LOW BLOOD SUGAR)  , Historical Med      insulin lispro (HumaLOG) 100 units/mL injection Inject 90 Units under the skin daily, Starting Wed 3/8/2023, Until Sun 9/24/2023, Historical Med      Insulin Pen Needle (BD PEN NEEDLE NASIM U/F) 32G X 4 MM MISC by Does not apply route 4 (four) times a day for 180 days, Starting Wed 6/5/2019, Until Wed 10/19/2022, Normal      pancrelipase, Lip-Prot-Amyl, (CREON) 12,000 units capsule Take 36,000 units of lipase by mouth 3 (three) times a day with meals Take 3 capsules prior to each meal and 1 prior to snack, Starting Wed 3/8/2023, Normal      sertraline (Zoloft) 50 mg tablet Take 1 tablet (50 mg total) by mouth daily, Starting Wed 3/22/2023, Normal           No discharge procedures on file      PDMP Review       Value Time User    PDMP Reviewed  Yes 7/9/2021  3:50 AM Colletta Helena, MD ED Provider  Attending physically available and evaluated [facility]  I managed the patient along with the ED Attending      Electronically Signed by         Altaf Agrawal MD  03/27/23 4044

## 2023-03-23 NOTE — OR NURSING
1441 patient returned to bay to recover was awake and then began to thrash and incoherent  CRNA and staff at beside  Pillow surrounding pt head to protect  Airway intact  Anesthesia at bedside  (see anesthesia note  Dr Cherie Painting and Dr Eleazar Rhodes at bedside  911 called during event  Pt to be transferred to ER per physician request   with patient  Dr Herrera Rodrigues, anesthesia ordered and administered versed, 2mg at [phone]  Haldol 5mg administered at 1511  Pt thrashing subsided  Paramedics at bedside and transferring patient at this time to the ED

## 2023-03-23 NOTE — ANESTHESIA POSTPROCEDURE EVALUATION
Post-Op Assessment Note    CV Status:  Stable    Pain management: adequate     Mental Status:  Combative and confused   Hydration Status:  Euvolemic   Airway Patency:  Patent      Post Op Vitals Reviewed: Yes            No notable events documented  /59 (03/23/23 1513)    Temp      Pulse 96 (03/23/23 1513)   Resp 18 (03/23/23 1513)    SpO2 98 % (03/23/23 1513)      Post MAC anesthetic, Elder Reyes exhibited symptoms of emergence delirium requiring >4 people to hold her down  She was combative requiring 5mg IV haldol, >50mcg precedex, 2mg midazolam, and 50mcg of fentanyl   Recommend that any future cases/anesthetics be done at the [facility]

## 2023-03-23 NOTE — ANESTHESIA POSTPROCEDURE EVALUATION
Post-Op Assessment Note    CV Status:  Stable  Pain Score: 0    Pain management: adequate     Mental Status:  Sleepy   Hydration Status:  Stable and euvolemic   PONV Controlled:  None   Airway Patency:  Patent      Post Op Vitals Reviewed: Yes      Staff: CRNA         No notable events documented      BP   97/54   Temp      Pulse 67   Resp 14   SpO2 99

## 2023-03-23 NOTE — H&P
History and Physical -  [facility]  Lillie Bamberger 34 y o  female MRN: [id number]    HPI: Lillie Bamberger is a 34y o  year old female who presents with RUQ pain,nausea, vomiting, intestinal metaplasia on prior EGD    Review of Systems    Historical Information   Past Medical History:   Diagnosis Date   • Abdominal pain    • Anxiety    • Anxiety and depression    • COVID-19 12/2020   • Delirium, induced by drug     anesthesia   • Depression    • Diabetes mellitus ([facility] [address] )    • Disease of thyroid gland     Hashimoto   • DKA, type 1 ([facility] [address] ) 05/11/2021   • Eating disorder     history of anorexia/bulemia [id number]   • Fracture of fibula     R Salter I   • Saundra Verito disease    • Hashimoto's disease 02/21/2020   • Head injury    • Headache(784 0)    • Nasal congestion    • PTSD (post-traumatic stress disorder)    • Rectal bleed    • Seizures ([facility] [address] )    • Type 1 diabetes ([facility] )      Past Surgical History:   Procedure Laterality Date   • COLONOSCOPY     • KNEE SURGERY Right 07/06/2020   • NASAL SEPTOPLASTY W/ TURBINOPLASTY     • SINUS SURGERY     • SKIN BIOPSY     • TURBINOPLASTY N/A 3/22/2021    Procedure: Basilio Fuentes;  Surgeon: Nury Qureshi MD;  Location[facility];  Service: ENT   • UPPER GASTROINTESTINAL ENDOSCOPY     • WISDOM TOOTH EXTRACTION     • WRIST SURGERY      left;  Excision of ganglion     Social History   Social History     Substance and Sexual Activity   Alcohol Use Not Currently     Social History     Substance and Sexual Activity   Drug Use Never     Social History     Tobacco Use   Smoking Status Never   Smokeless Tobacco Never   Tobacco Comments    Tobacco smoke exposure (Father smokes cigars)     Family History   Problem Relation Age of Onset   • Hypertension Mother    • Migraines Mother         Headache   • Diabetes type II Mother    • Varicose Veins Mother    • Hyperlipidemia Mother    • Diabetes Mother    • Arthritis Mother    • Depression Mother    • Hearing loss Mother    • Anxiety disorder Mother    • Cholelithiasis Father    • Hypertension Father    • Sarcoidosis Father         Liver   • Hyperlipidemia Father    • Diabetes Father    • Coronary artery disease Father    • Nephrolithiasis Father    • Cirrhosis Father    • Alcohol abuse Father    • Thyroid disease Sister    • Hashimoto's thyroiditis Sister    • Cancer Family    • Diabetes Family    • Hypertension Family    • Alcohol abuse Brother        Meds/Allergies     (Not in a hospital admission)      Allergies   Allergen Reactions   • Insulin Glargine Other (See Comments)     LANTUS BRAND -Burning and redness under skin        Objective     /63   Pulse 78   Temp 97 9 °F (36 6 °C) (Temporal)   Resp 16   Ht 5' 1" (1 549 m)   Wt 61 2 kg (135 lb)   LMP  (LMP Unknown) Comment: pregnancy negative  SpO2 100%   BMI 25 51 kg/m²       PHYSICAL EXAM    Gen: NAD  CV: RRR  CHEST: Clear  ABD: soft, NT/ND  EXT: no edema  Neuro: AAO      ASSESSMENT/PLAN:  This is a 34y o  year old female here for RUQ pain,nausea, vomiting, intestinal metaplasia on prior EGD      PLAN:   Procedure: egd

## 2023-03-23 NOTE — ANESTHESIA PREPROCEDURE EVALUATION
Procedure:  EGD    Relevant Problems   ANESTHESIA   (+) History of anesthesia complications      CARDIO   (+) Hyperlipidemia   (+) Other chest pain      ENDO   (+) Controlled diabetes mellitus type 1 with complications (HCC)   (+) Primary hypothyroidism   (+) Type 1 diabetes mellitus (HCC)   (+) Type 1 diabetes mellitus with hyperglycemia (HCC)   (+) Uncontrolled type 1 diabetes mellitus with hypoglycemia without coma (HCC)      GI/HEPATIC   (+) Gastroesophageal reflux disease      MUSCULOSKELETAL   (+) Chronic back pain   (+) Left low back pain   (+) Right-sided back pain      NEURO/PSYCH   (+) Chronic abdominal pain   (+) Chronic back pain   (+) Chronic post-traumatic stress disorder (PTSD)   (+) Generalized anxiety disorder with panic attacks   (+) History of anesthesia complications   (+) OCD (obsessive compulsive disorder)   (+) Paresthesia of left leg   (+) Seizures (HCC)   (+) Word finding difficulty        Physical Exam    Airway    Mallampati score: II  TM Distance: >3 FB  Neck ROM: full     Dental   No notable dental hx     Cardiovascular  Rhythm: regular, Rate: normal,     Pulmonary  Breath sounds clear to auscultation,     Other Findings  Intercisor Distance > 3cm          Anesthesia Plan  ASA Score- 2     Anesthesia Type- IV sedation with anesthesia with ASA Monitors  Additional Monitors:   Airway Plan:     Comment: NPO appropriate  Discussed benefits/risks of monitored anesthetic care which involves providing a dynamic level of mild to deep sedation  Complications include awareness and/or airway obstruction/aspiration which may necessitate conversion to general anesthesia  All questions answered  Patient understands and wishes to proceed          Plan Factors-Exercise tolerance (METS): >4 METS  Chart reviewed  EKG reviewed  Existing labs reviewed  Induction-     Postoperative Plan- Plan for postoperative opioid use       Informed Consent- Anesthetic plan and risks discussed with patient  I personally reviewed this patient with the CRNA  Discussed and agreed on the Anesthesia Plan with the CRNA  Nancy Gray

## 2023-03-24 ENCOUNTER — NURSE TRIAGE (OUTPATIENT)
Dept: OTHER | Facility: OTHER | Age: 28
End: 2023-03-24

## 2023-03-24 NOTE — ED ATTENDING ATTESTATION
3/23/2023  I, Efrain Shaffer MD, saw and evaluated the patient  I have discussed the patient with the resident/non-physician practitioner and agree with the resident's/non-physician practitioner's findings, Plan of Care, and MDM as documented in the resident's/non-physician practitioner's note, except where noted  All available labs and Radiology studies were reviewed  I was present for key portions of any procedure(s) performed by the resident/non-physician practitioner and I was immediately available to provide assistance  At this point I agree with the current assessment done in the Emergency Department  I have conducted an independent evaluation of this patient a history and physical is as follows:    63-years-old female with diabetes, anxiety, depression, PTSD, hypothyroidism presents to the emergency via EMS from ambulatory surgery center following endoscopy  She had poor emergence from anesthesia-was combative, confused and not redirectable, requiring additional pharmacological therapy including Haldol 5 mg, Versed 2 mg, Precedex 50+ mcg and fentanyl 50 mcg  She was sedated on arrival with comfortable appearing respirations  At time of my assessment, following initial assessment by Dr Jerry Betancourt she roused briefly, opening eyes with moderate voice  She indicated that she felt okay and was just "tired "  She closed her eyes shortly after and denied having any pain, difficulty breathing or nausea  Blood pressures lowered slightly while resting  Her baseline seems to be low 100s to low 110s  Saline administered  Glucose was checked and not low at 181  She remained monitored with frequent checks and gradually roused to full alertness  Mentation was appropriate and she felt well for discharge          ED Course         Critical Care Time  Procedures

## 2023-03-24 NOTE — TELEPHONE ENCOUNTER
Reason for Disposition  • Nursing judgment or information in reference    Answer Assessment - Initial Assessment Questions  1  REASON FOR CALL: "What is your main concern right now?"      Patient had EGD yesterday; stated that she was combative after the procedure and she had to be held down by staff  Patient stated that her right wrist is swollen  Patient stated that she had to have restraints placed and also had an IV in that area as well  Patient stated that her wrist looks double in size  Patient stated her wrist and 4th and 5th finger are painful to move  Patient stated there is some slight bruising to the area as well  2  ONSET: "When did the symptoms start?"      Last night  3  SEVERITY: "How bad is the symptoms?"     Moderate-severe   4  FEVER: "Do you have a fever?"      Patient also feels warm, but doesn't have thermometer    Patient stated that she did go to the ED after the procedure and was transferred to the hospital after the procedure      Protocols used: NO GUIDELINE AVAILABLE-ADULT-

## 2023-03-24 NOTE — TELEPHONE ENCOUNTER
Regarding: Wrist swollen after anesthesia  ----- Message from [address] sent at 3/24/2023  3:21 PM EDT -----  " Rigo Zuniga I had an EGD but I had a complication with my anesthesia and right now one of my wrists is swollen and I don't know if I should go to the ER "

## 2023-03-24 NOTE — TELEPHONE ENCOUNTER
Patient wanted to know if she should be seen for her swollen wrist at a location or if provider had any other recommendations

## 2023-03-31 DIAGNOSIS — M35.9 UNDIFFERENTIATED CONNECTIVE TISSUE DISEASE (HCC): ICD-10-CM

## 2023-03-31 RX ORDER — HYDROXYCHLOROQUINE SULFATE 200 MG/1
200 TABLET, FILM COATED ORAL 2 TIMES DAILY WITH MEALS
Qty: 180 TABLET | Refills: 3 | Status: SHIPPED | OUTPATIENT
Start: 2023-03-31 | End: 2024-03-25

## 2023-03-31 RX ORDER — FOLIC ACID 1 MG/1
1 TABLET ORAL DAILY
Qty: 90 TABLET | Refills: 3 | Status: SHIPPED | OUTPATIENT
Start: 2023-03-31

## 2023-04-03 ENCOUNTER — TELEPHONE (OUTPATIENT)
Dept: ADMINISTRATIVE | Facility: OTHER | Age: 28
End: 2023-04-03

## 2023-04-03 NOTE — TELEPHONE ENCOUNTER
----- Message from Susannah Jiménez LPN sent at 8/78/7486  3:50 PM EDT -----  Regarding: care gap request  03/31/23 3:50 PM    Hello, our patient attached above has had Diabetic Foot Exam completed/performed  Please assist in updating the patient chart by pulling the Care Everywhere (CE) document  The date of service is 2/6/2023       Thank you,  Susannah Galicia FP

## 2023-04-03 NOTE — TELEPHONE ENCOUNTER
Upon review of the In Basket request we were able to locate, review, and update the patient chart as requested for Diabetic Foot Exam     Any additional questions or concerns should be emailed to the Practice Liaisons via the appropriate education email address, please do not reply via In [employer]      Thank you  Mortimer Formica, MA

## 2023-04-05 DIAGNOSIS — F43.12 CHRONIC POST-TRAUMATIC STRESS DISORDER (PTSD): ICD-10-CM

## 2023-05-15 ENCOUNTER — SOCIAL WORK (OUTPATIENT)
Dept: BEHAVIORAL/MENTAL HEALTH CLINIC | Facility: CLINIC | Age: 28
End: 2023-05-15

## 2023-05-15 DIAGNOSIS — F42.2 MIXED OBSESSIONAL THOUGHTS AND ACTS: Primary | ICD-10-CM

## 2023-05-15 DIAGNOSIS — F41.0 GENERALIZED ANXIETY DISORDER WITH PANIC ATTACKS: ICD-10-CM

## 2023-05-15 DIAGNOSIS — F41.1 GENERALIZED ANXIETY DISORDER WITH PANIC ATTACKS: ICD-10-CM

## 2023-05-15 DIAGNOSIS — F31.62 BIPOLAR DISORDER, CURRENT EPISODE MIXED, MODERATE (HCC): ICD-10-CM

## 2023-05-15 DIAGNOSIS — F43.12 CHRONIC POST-TRAUMATIC STRESS DISORDER (PTSD): ICD-10-CM

## 2023-05-15 NOTE — PSYCH
Behavioral Health Psychotherapy Progress Note    Psychotherapy Provided: Individual Psychotherapy     1  Mixed obsessional thoughts and acts        2  Generalized anxiety disorder with panic attacks        3  Chronic post-traumatic stress disorder (PTSD)        4  Bipolar disorder, current episode mixed, moderate ([address] )            Goals addressed in session: Goal 1 and Goal 2     DATA:     Met with Jenny individually  'It's been a hard few weeks '  Stuttered much more today - anxiety  Job very overwhelming - looking for a new job - multiple applications placed  Multiple stressors regarding [employer] and school - graduation on hold until next year right now  Having trouble getting meds due to insurance conflicts  Till haven't gotten Zoloft  Since family dog passed Ulices Wadsworth explained she is taking care of mom emotionally - 'I'm ready to snap'  All stressors have been affecting word recall, obsessional thoughts 'taking over'   States often 'goes into another realm'  Validation of body going into it's hx trauma state due to stressors - 'My dad is just mean' - specific comments contributing to family stressors  Denied SI    During this session, this clinician used the following therapeutic modalities: Cognitive Behavioral Therapy and Supportive Psychotherapy    Substance Abuse was not addressed during this session  If the client is diagnosed with a co-occurring substance use disorder, please indicate any changes in the frequency or amount of use:   Stage of change for addressing substance use diagnoses: No substance use/Not applicable    ASSESSMENT:  Salina Adams presents with a Anxious and Depressed mood  her affect is Blunted with incontrollable laughter, which is congruent, with her mood and the content of the session  The client has not made progress on their goals  Salina Adams presents with a low risk of suicide, low risk of self-harm, and low risk of harm to others      For any risk assessment "that surpasses a \"low\" rating, a safety plan must be developed  A safety plan was indicated: no  If yes, describe in detail     PLAN: Between sessions, Liz Zamarripa will monitor blood sugars, await job application status'  At the next session, the therapist will use Cognitive Behavioral Therapy and Supportive Psychotherapy to address medical issues being triggered by stress,     [address] and Discharge Planning: Liz Zamarripa is aware of and agrees to continue to work on their treatment plan  They have identified and are working toward their discharge goals   yes    Visit start and stop times:    05/15/23  Start Time: 0921  Stop Time: 6978  Total Visit Time: 54 minutes  "

## 2023-05-30 ENCOUNTER — SOCIAL WORK (OUTPATIENT)
Dept: BEHAVIORAL/MENTAL HEALTH CLINIC | Facility: CLINIC | Age: 28
End: 2023-05-30

## 2023-05-30 DIAGNOSIS — F43.12 CHRONIC POST-TRAUMATIC STRESS DISORDER (PTSD): ICD-10-CM

## 2023-05-30 DIAGNOSIS — F42.2 MIXED OBSESSIONAL THOUGHTS AND ACTS: Primary | ICD-10-CM

## 2023-05-30 DIAGNOSIS — F41.0 GENERALIZED ANXIETY DISORDER WITH PANIC ATTACKS: ICD-10-CM

## 2023-05-30 DIAGNOSIS — F41.1 GENERALIZED ANXIETY DISORDER WITH PANIC ATTACKS: ICD-10-CM

## 2023-05-30 DIAGNOSIS — F31.62 BIPOLAR DISORDER, CURRENT EPISODE MIXED, MODERATE (HCC): ICD-10-CM

## 2023-05-30 NOTE — PSYCH
"Behavioral Health Psychotherapy Progress Note    Psychotherapy Provided: Individual Psychotherapy     1  Mixed obsessional thoughts and acts        2  Generalized anxiety disorder with panic attacks        3  Chronic post-traumatic stress disorder (PTSD)        4  Bipolar disorder, current episode mixed, moderate ([address] )            Goals addressed in session: Goal 1 and Goal 2     DATA:     Met with Jenny individually  Started Zoloft 2 weeks ago - still experiencing nausea  Sister's wedding Thurs night  Worried how the dynamic between 2 sisters  Sister's finance has already pulled Freida Benavidez away from the family 'he's almost has her embarrassed with our race already '  Dog on palliative care - nothing anyone can do but has been active  Still working at same job though looking for another - can't stop working due to [employer] status being considered - Tenzin Hernandes is [employer]  Feels boss has been listening to complaints and working with Tenzin Hernandes and colleague  Denied SI    During this session, this clinician used the following therapeutic modalities: Cognitive Behavioral Therapy and Supportive Psychotherapy    Substance Abuse was not addressed during this session  If the client is diagnosed with a co-occurring substance use disorder, please indicate any changes in the frequency or amount of use:   Stage of change for addressing substance use diagnoses: No substance use/Not applicable    ASSESSMENT:  Arline Nelson presents with a Anxious and Labile mood  her affect is Normal range and intensity, which is congruent, with her mood and the content of the session  The client has made progress on their goals  Arline Nelson presents with a low risk of suicide, low risk of self-harm, and low risk of harm to others  For any risk assessment that surpasses a \"low\" rating, a safety plan must be developed      A safety plan was indicated: no  If yes, describe in detail     PLAN: Between sessions, Tenzin Hernandes " Ravi will continue to monitor medical issues, attend sister's wedding,   At the next session, the therapist will use Cognitive Behavioral Therapy and Supportive Psychotherapy to address events of sister's wedding, Chris's school decision, family dynamics  Behavioral Health Treatment Plan and Discharge Planning: Vamsi Sotelo is aware of and agrees to continue to work on their treatment plan  They have identified and are working toward their discharge goals   no    Visit start and stop times:    05/30/23  Start Time: 1655  Stop Time: 1745  Total Visit Time: 50 minutes

## 2023-06-05 ENCOUNTER — APPOINTMENT (OUTPATIENT)
Dept: LAB | Age: 28
End: 2023-06-05
Payer: COMMERCIAL

## 2023-06-05 DIAGNOSIS — E10.8 CONTROLLED DIABETES MELLITUS TYPE 1 WITH COMPLICATIONS (HCC): ICD-10-CM

## 2023-06-05 LAB
EST. AVERAGE GLUCOSE BLD GHB EST-MCNC: 137 MG/DL
HBA1C MFR BLD: 6.4 %
TSH SERPL DL<=0.05 MIU/L-ACNC: 1.62 UIU/ML (ref 0.45–4.5)

## 2023-06-05 PROCEDURE — 83036 HEMOGLOBIN GLYCOSYLATED A1C: CPT

## 2023-06-05 PROCEDURE — 36415 COLL VENOUS BLD VENIPUNCTURE: CPT

## 2023-06-05 PROCEDURE — 84443 ASSAY THYROID STIM HORMONE: CPT

## 2023-06-08 ENCOUNTER — OFFICE VISIT (OUTPATIENT)
Dept: FAMILY MEDICINE CLINIC | Facility: CLINIC | Age: 28
End: 2023-06-08
Payer: COMMERCIAL

## 2023-06-08 VITALS
HEIGHT: 61 IN | TEMPERATURE: 98 F | SYSTOLIC BLOOD PRESSURE: 122 MMHG | OXYGEN SATURATION: 96 % | BODY MASS INDEX: 25.79 KG/M2 | DIASTOLIC BLOOD PRESSURE: 78 MMHG | HEART RATE: 89 BPM | RESPIRATION RATE: 14 BRPM | WEIGHT: 136.6 LBS

## 2023-06-08 DIAGNOSIS — R76.8 RHEUMATOID FACTOR POSITIVE: ICD-10-CM

## 2023-06-08 DIAGNOSIS — R76.8 POSITIVE ANA (ANTINUCLEAR ANTIBODY): ICD-10-CM

## 2023-06-08 DIAGNOSIS — M35.9 UNDIFFERENTIATED CONNECTIVE TISSUE DISEASE (HCC): ICD-10-CM

## 2023-06-08 DIAGNOSIS — H91.91 HEARING LOSS OF RIGHT EAR, UNSPECIFIED HEARING LOSS TYPE: Primary | ICD-10-CM

## 2023-06-08 PROCEDURE — 99214 OFFICE O/P EST MOD 30 MIN: CPT | Performed by: NURSE PRACTITIONER

## 2023-06-08 NOTE — PROGRESS NOTES
"Name: Dewayne Edwards      : 1995      MRN: [id number]  Encounter Provider: MARGARET Weston  Encounter Date: 2023   Encounter department: [place] [address]  Hearing loss of right ear, unspecified hearing loss type  Assessment & Plan:  Subjective hearing loss, tinnitus with intermittent sensation of unsteadiness  Chao lateralizes to the left ear, both ears AC>BC  Normal TM  No neuro deficits on exam   Referral to audiology for comprehensive evaluation  Orders:  -     Ambulatory Referral to Audiology; Future    2  Rheumatoid factor positive  -     Ambulatory Referral to Allergy; Future    3  Positive SOHAM (antinuclear antibody)  -     Ambulatory Referral to Allergy; Future    4  Undifferentiated connective tissue disease ([facility] )  Assessment & Plan:  Currently working with rheumatology, being treated with plaquenil and methotrexate, no specific diagnosis  Continues with varied, non-specific complaints - currently fevers, hearing loss, unsteadiness  Normal exam in office today  History of abnormal auto-immune labs with no clear diagnosis  Would like further evaluation with immunology  Referred to Dr Bossman Moore    Orders:  -     Ambulatory Referral to Allergy; Future         Subjective      Pt is a 34 y o  y/o female who is seen today for evaluation of dizziness and hearing loss  PMH of GERD, DM I, hypothyroidism, neuropathy, joint pains, amenorrhea, bipolar disorder, anxiety/depression  She started with symptoms about 2 months ago  She states she started to notice hearing loss after she had covid at the beginning of the ear  She says the symptoms seem to come and go, there are times when it seems like out of know where her hearing just seems to reduce  She says things sounds muffled or not clear  She says there are times when somebody right next to her is talking and she may not even realize that somebody is speaking    She has a \"ringing\" that she " "describes as a \"pressurized\" feeling  She has no ear pain  She notes more ear wax but no other d/c  She has congestion, post nasal drip that waxes and wanes  She runs occasional low grade fevers at times  She started to notice episodes of feeling unsteady in March  These episodes were initially brief, now lasting a few minutes  Initially, this was happening once a day, now usually at least two times a day  At times this unsteady feeling correlates with the muffled hearing and those episodes seem to last longer  She states she lost her dog in April, the dog was her best friend for about 16 years and she is not sure if these are physical manifestations of the grief and stress  Review of Systems   Constitutional: Positive for fatigue and fever  Negative for chills  HENT: Positive for congestion, hearing loss, postnasal drip and tinnitus  Eyes: Negative for visual disturbance  Respiratory: Negative for shortness of breath  Cardiovascular: Negative for chest pain and palpitations  Neurological: Positive for dizziness and headaches  Negative for syncope and light-headedness  Current Outpatient Medications on File Prior to Visit   Medication Sig   • BD Insulin Syringe U/F 31G X 5/16\" 0 5 ML MISC Use as directly with weekly methotrexate injection  • clonazePAM (KlonoPIN) 0 5 mg tablet Take 1 tablet (0 5 mg total) by mouth daily   • Cyanocobalamin (VITAMIN B 12 PO) Take by mouth   • dicyclomine (BENTYL) 10 mg capsule TAKE 1 CAPSULE BY MOUTH 3 TIMES A DAY AS NEEDED (BOWEL SPASM)   • doxycycline hyclate (VIBRAMYCIN) 100 mg capsule    • folic acid (FOLVITE) 1 mg tablet Take 1 tablet (1 mg total) by mouth daily   • gabapentin (Neurontin) 300 mg capsule Take 1 capsule (300 mg total) by mouth daily at bedtime PRN   • glucagon 1 MG injection 1 mg   • Glucagon HCl (Glucagon Emergency) 1 MG/ML SOLR INJECT 1 MG AS DIRECTED AS NEEDED (WHEN PASSED OUT FROM LOW BLOOD SUGAR)     • hydroxychloroquine " "(PLAQUENIL) 200 mg tablet Take 1 tablet (200 mg total) by mouth 2 (two) times a day with meals   • insulin aspart (NovoLOG) 100 units/mL injection Inject  Units under the skin   • insulin lispro (HumaLOG) 100 units/mL injection Inject 90 Units under the skin daily   • levonorgestrel-ethinyl estradiol (Altavera) 0 15-30 MG-MCG per tablet Take 1 tablet by mouth daily   • levothyroxine 25 mcg tablet Take 1 tablet (25 mcg total) by mouth daily   • metFORMIN (GLUCOPHAGE-XR) 500 mg 24 hr tablet 1,000 mg 2 (two) times a day with meals     • methotrexate 2 5 mg tablet    • methotrexate 50 MG/2ML injection Inject 0 8 mL (20 mg total) under the skin once a week   • NovoLOG 100 UNIT/ML injection INJECT 3 TIMES A DAY WITH MEALS BASED ON ICR 4 AND ISF 30 FOR TDD 90 UNITS DAILY VIA INSULIN PUMP   • OneTouch Verio test strip USE 4 TIMES A DAY BEFORE MEALS AND AT BEDTIME   • pancrelipase, Lip-Prot-Amyl, (CREON) 12,000 units capsule Take 36,000 units of lipase by mouth 3 (three) times a day with meals Take 3 capsules prior to each meal and 1 prior to snack   • promethazine (PHENERGAN) 12 5 MG tablet Take 1 tablet (12 5 mg total) by mouth every 6 (six) hours as needed for nausea or vomiting   • sertraline (ZOLOFT) 50 mg tablet TAKE 1 TABLET BY MOUTH EVERY DAY   • Tresiba FlexTouch 100 units/mL injection pen INJECT 34 UNITS DAILY IN THE EVENT OF PUMP FAILURE E10 65   • Insulin Pen Needle (BD PEN NEEDLE NASIM U/F) 32G X 4 MM MISC by Does not apply route 4 (four) times a day for 180 days   • pantoprazole (PROTONIX) 40 mg tablet Take 1 tablet (40 mg total) by mouth daily       Objective     /78 (BP Location: Right arm, Patient Position: Sitting, Cuff Size: Standard)   Pulse 89   Temp 98 °F (36 7 °C) (Tympanic)   Resp 14   Ht 5' 1\" (1 549 m)   Wt 62 kg (136 lb 9 6 oz)   SpO2 96%   BMI 25 81 kg/m²     Physical Exam  Vitals reviewed  Constitutional:       General: She is awake  She is not in acute distress       " Appearance: Normal appearance  She is well-developed and well-groomed  She is not ill-appearing or diaphoretic  HENT:      Head: Normocephalic  Right Ear: Hearing, tympanic membrane, ear canal and external ear normal       Left Ear: Hearing, tympanic membrane, ear canal and external ear normal       Ears:      Chao exam findings: lateralizes left  Right Rinne: AC > BC  Left Rinne: AC > BC  Nose: Mucosal edema present  Mouth/Throat:      Lips: Pink  Pharynx: Oropharynx is clear  Eyes:      General: Lids are normal       Conjunctiva/sclera: Conjunctivae normal       Pupils: Pupils are equal, round, and reactive to light  Neck:      Vascular: Normal carotid pulses  No carotid bruit  Cardiovascular:      Rate and Rhythm: Normal rate and regular rhythm  Heart sounds: Normal heart sounds  No murmur heard  Pulmonary:      Effort: Pulmonary effort is normal       Breath sounds: Normal breath sounds  Skin:     General: Skin is dry  Neurological:      Mental Status: She is alert and oriented to person, place, and time  Cranial Nerves: Cranial nerves 2-12 are intact  Motor: Motor function is intact  Coordination: Romberg sign negative  Coordination normal  Finger-Nose-Finger Test and Heel to [facility] Test normal       Gait: Gait is intact  Psychiatric:         Attention and Perception: Attention normal          Mood and Affect: Mood normal          Speech: Speech normal          Behavior: Behavior normal  Behavior is cooperative  Thought Content:  Thought content normal          Cognition and Memory: Cognition normal          Judgment: Judgment normal        MARGARET He

## 2023-06-08 NOTE — ASSESSMENT & PLAN NOTE
Subjective hearing loss, tinnitus with intermittent sensation of unsteadiness  Chao lateralizes to the left ear, both ears AC>BC  Normal TM  No neuro deficits on exam   Referral to audiology for comprehensive evaluation

## 2023-06-08 NOTE — ASSESSMENT & PLAN NOTE
Currently working with rheumatology, being treated with plaquenil and methotrexate, no specific diagnosis  Continues with varied, non-specific complaints - currently fevers, hearing loss, unsteadiness  Normal exam in office today  History of abnormal auto-immune labs with no clear diagnosis  Would like further evaluation with immunology    Referred to Dr Humza Gomez

## 2023-06-14 ENCOUNTER — OFFICE VISIT (OUTPATIENT)
Dept: AUDIOLOGY | Age: 28
End: 2023-06-14
Payer: COMMERCIAL

## 2023-06-14 DIAGNOSIS — H90.3 SENSORY HEARING LOSS, BILATERAL: Primary | ICD-10-CM

## 2023-06-14 DIAGNOSIS — H91.91 HEARING LOSS OF RIGHT EAR, UNSPECIFIED HEARING LOSS TYPE: ICD-10-CM

## 2023-06-14 PROCEDURE — 92567 TYMPANOMETRY: CPT | Performed by: AUDIOLOGIST-HEARING AID FITTER

## 2023-06-14 PROCEDURE — 92557 COMPREHENSIVE HEARING TEST: CPT | Performed by: AUDIOLOGIST-HEARING AID FITTER

## 2023-06-14 NOTE — PROGRESS NOTES
HEARING EVALUATION    Name:  Berlin Angela  :  1995  Age:  34 y o  Date of Evaluation: 23     History: Tinnitus, Dizziness and Difficulty Understanding  Reason for visit: Berlin Angela is being seen today at the request of Dr Clark Robles for an evaluation of hearing  Patient reports trouble hearing at times, tinnitus in both ears, aural fullness, and dizziness that started a few months ago  EVALUATION:    Otoscopic Evaluation:   Right Ear: Clear and healthy ear canal and tympanic membrane   Left Ear: Clear and healthy ear canal and tympanic membrane    Tympanometry:   Right: Type A - normal middle ear pressure and compliance   Left: Type A - normal middle ear pressure and compliance    Audiogram Results:  Pure tone testing revealed normal hearing sensitivity bilaterally  SRT and PTA are in agreement indicating good test reliability  Word recognition scores were  excellent bilaterally  *see attached audiogram      RECOMMENDATIONS:  Consult ENT and Return to [facility]  for F/U    PATIENT EDUCATION:   Discussed results and recommendations with Joseph Yoon  Questions were addressed and the patient was encouraged to contact our department should concerns arise        Stephany Tena , [facility]  Clinical Audiologist

## 2023-06-20 ENCOUNTER — OFFICE VISIT (OUTPATIENT)
Dept: FAMILY MEDICINE CLINIC | Facility: CLINIC | Age: 28
End: 2023-06-20
Payer: COMMERCIAL

## 2023-06-20 VITALS
DIASTOLIC BLOOD PRESSURE: 62 MMHG | HEART RATE: 52 BPM | RESPIRATION RATE: 16 BRPM | OXYGEN SATURATION: 98 % | TEMPERATURE: 97.2 F | BODY MASS INDEX: 22.86 KG/M2 | SYSTOLIC BLOOD PRESSURE: 110 MMHG | HEIGHT: 63 IN | WEIGHT: 129 LBS

## 2023-06-20 DIAGNOSIS — L81.9 PIGMENTATION ABNORMALITY OF SKIN: ICD-10-CM

## 2023-06-20 DIAGNOSIS — Z00.00 ANNUAL PHYSICAL EXAM: Primary | ICD-10-CM

## 2023-06-20 DIAGNOSIS — E10.8 CONTROLLED DIABETES MELLITUS TYPE 1 WITH COMPLICATIONS (HCC): ICD-10-CM

## 2023-06-20 DIAGNOSIS — F31.62 BIPOLAR DISORDER, CURRENT EPISODE MIXED, MODERATE (HCC): ICD-10-CM

## 2023-06-20 PROBLEM — U07.1 COVID-19: Status: RESOLVED | Noted: 2020-12-31 | Resolved: 2023-06-20

## 2023-06-20 PROBLEM — E44.0 MODERATE PROTEIN-CALORIE MALNUTRITION (HCC): Status: RESOLVED | Noted: 2022-09-28 | Resolved: 2023-06-20

## 2023-06-20 PROCEDURE — 99395 PREV VISIT EST AGE 18-39: CPT | Performed by: NURSE PRACTITIONER

## 2023-06-20 NOTE — PROGRESS NOTES
[address] [facility] [address]    NAME: Ron Eller  AGE: 34 y o  SEX: female  : 1995     DATE: 2023     Assessment and Plan:     Problem List Items Addressed This Visit        Endocrine    Controlled diabetes mellitus type 1 with complications ([facility] [address] )     S8Y normal though she has highs and lows often  Managed by endo at [facility]  Uses insulin pump  Scheduled for eye exam next month  Continue management and f/u   Lab Results   Component Value Date    HGBA1C 6 4 (H) 2023               Other    Annual physical exam - Primary     Labs up to date, gets regular dental and eye exams  Sees gyn regularly  Pap up to date  Bipolar affective disorder ([facility] [address] )     Managed by psych  Was on zoloft but has had increased SI  She denies plans to harm herself  She spoke with her therapist who spoke to her psychiatrist and they are aware she stopped this yesterday  She is scheduled with both for f/u this week and next week respectively  Pigmentation abnormality of skin     Brown pigmentation on back, slightly on chest   Red/purple under woods lamp  It is asymptomatic  Referred to dermatology  Relevant Orders    Ambulatory Referral to Dermatology       Immunizations and preventive care screenings were discussed with patient today  Appropriate education was printed on patient's after visit summary  Counseling:  Dental Health: discussed importance of regular tooth brushing, flossing, and dental visits  Injury prevention: discussed safety/seat belts, safety helmets, smoke detectors, carbon dioxide detectors, and smoking near bedding or upholstery  · Exercise: the importance of regular exercise/physical activity was discussed  Recommend exercise 3-5 times per week for at least 30 minutes  No follow-ups on file       Chief Complaint:     Chief Complaint   Patient presents with   • Physical Exam     Pt here for annual physical exam      History of Present Illness:     Adult Annual Physical   Patient here for a comprehensive physical exam  The patient reports no problems  Diet and Physical Activity  · Diet/Nutrition: limited junk food, limited fruits/vegetables and does not follow a diabetic diet  · Exercise: no formal exercise  Depression Screening  PHQ-2/9 Depression Screening         General Health  · Sleep: gets more than 8 hours of sleep on average and naps often during the day and then sleeps through the night  · Hearing: normal - bilateral   · Vision: vision problems: some blurring and goes for regular eye exams  · Dental: regular dental visits, brushes teeth twice daily and water pik use several days a week  /GYN Health  · Last menstrual period: does not get a regular period  · Contraceptive method: barrier methods, oral contraceptives  · History of STDs?: no      Review of Systems:     Review of Systems   Constitutional: Negative for activity change, appetite change, chills, diaphoresis, fatigue, fever and unexpected weight change  HENT: Negative for hearing loss  Eyes: Negative for visual disturbance  Respiratory: Negative for cough, chest tightness and shortness of breath  Cardiovascular: Negative for chest pain, palpitations and leg swelling  Gastrointestinal: Positive for nausea and vomiting  Negative for abdominal pain, constipation and diarrhea  Believes due to her sugar being high   Genitourinary: Positive for menstrual problem  Negative for difficulty urinating, dysuria and frequency  Musculoskeletal: Negative for arthralgias and myalgias  Allergic/Immunologic: Negative for environmental allergies  Neurological: Positive for dizziness  Negative for syncope, weakness, light-headedness and headaches  Psychiatric/Behavioral: Positive for dysphoric mood and suicidal ideas (passive thoughts of death)  Negative for self-injury and sleep disturbance   The patient is not nervous/anxious  Past Medical History:     Past Medical History:   Diagnosis Date   • Abdominal pain    • Anxiety    • Anxiety and depression    • COVID-19 12/2020   • Delayed emergence from anesthesia 03/23/2023    Severe episode of emergence delirium (confused, combative) after MAC anesthetic on 03/2023 for EGD  Received versed 2mg, >50mcg precedex, 5mg IV haldol, and 50mcg fentanyl  Waxing and waning episodes of agitation  Any future anesthetics should NOT be done at [facility]  • Delirium, induced by drug     anesthesia   • Depression    • Diabetes mellitus ([facility] [address] )    • Disease of thyroid gland     Hashimoto   • DKA, type 1 ([facility] [address] ) 05/11/2021   • Eating disorder     history of anorexia/bulemia [id number]   • Fracture of fibula     R Salter I   • Radha Babin disease    • Hashimoto's disease 02/21/2020   • Head injury    • Headache(784 0)    • Nasal congestion    • PTSD (post-traumatic stress disorder)    • Rectal bleed    • Seizures (HCC)    • Type 1 diabetes ([facility] [address] )       Past Surgical History:     Past Surgical History:   Procedure Laterality Date   • COLONOSCOPY     • KNEE SURGERY Right 07/06/2020   • NASAL SEPTOPLASTY W/ TURBINOPLASTY     • SINUS SURGERY     • SKIN BIOPSY     • TURBINOPLASTY N/A 3/22/2021    Procedure: Shazia Hightower;  Surgeon: Jeanette Briseno MD;  Location: [facility];  Service: ENT   • UPPER GASTROINTESTINAL ENDOSCOPY     • WISDOM TOOTH EXTRACTION     • WRIST SURGERY      left;  Excision of ganglion      Social History:     Social History     Socioeconomic History   • Marital status: /Civil Union     Spouse name: None   • Number of children: 0   • Years of education: None   • Highest education level: Associate degree: academic program   Occupational History   • Occupation: unemployed   Tobacco Use   • Smoking status: Never     Passive exposure: Never   • Smokeless tobacco: Never   • Tobacco comments:     Tobacco smoke exposure (Father smokes cigars)   Vaping Use   • Vaping Use: Never used   Substance and Sexual Activity   • Alcohol use: Not Currently   • Drug use: Never   • Sexual activity: Yes     Partners: Male     Birth control/protection: Condom Male, OCP   Other Topics Concern   • None   Social History Narrative    Student at [employer] a poor diet; low in vegetables, high in sweets    Dental care, regularly    Lives with parents    Sleeps 8-10 hours a day     Social Determinants of Health     Financial Resource Strain: Medium Risk (4/12/2021)    Overall Financial Resource Strain (CARDIA)    • Difficulty of Paying Living Expenses: Somewhat hard   Food Insecurity: Not on file   Transportation Needs: Not on file   Physical Activity: Insufficiently Active (8/7/2020)    Exercise Vital Sign    • Days of Exercise per Week: 7 days    • Minutes of Exercise per Session: 10 min   Stress: Stress Concern Present (8/7/2020)    [address]    • Feeling of Stress : Rather much   Social Connections: Unknown (8/7/2020)    Social Connection and Isolation Panel [NHANES]    • Frequency of Communication with Friends and Family: More than three times a week    • Frequency of Social Gatherings with Friends and Family: Not on file    • Attends Scientologist Services: Not on file    • Active Member of Clubs or Organizations: No    • Attends Club or Organization Meetings: Never    • Marital Status: Never    Intimate Partner Violence: Not At Risk (8/7/2020)    Humiliation, Afraid, Rape, and Kick questionnaire    • Fear of Current or Ex-Partner: No    • Emotionally Abused: No    • Physically Abused: No    • Sexually Abused: No   Housing Stability: Not on file      Family History:     Family History   Problem Relation Age of Onset   • Hypertension Mother    • Migraines Mother         Headache   • Diabetes type II Mother    • Varicose Veins Mother    • Hyperlipidemia Mother    • Diabetes Mother    • Arthritis Mother    • Depression Mother    • "Hearing loss Mother    • Anxiety disorder Mother    • Cholelithiasis Father    • Hypertension Father    • Sarcoidosis Father         Liver   • Hyperlipidemia Father    • Diabetes Father    • Coronary artery disease Father    • Nephrolithiasis Father    • Cirrhosis Father    • Alcohol abuse Father    • Thyroid disease Sister    • Hashimoto's thyroiditis Sister    • Cancer Family    • Diabetes Family    • Hypertension Family    • Alcohol abuse Brother       Current Medications:     Current Outpatient Medications   Medication Sig Dispense Refill   • BD Insulin Syringe U/F 31G X 5/16\" 0 5 ML MISC Use as directly with weekly methotrexate injection  100 each 0   • clonazePAM (KlonoPIN) 0 5 mg tablet Take 1 tablet (0 5 mg total) by mouth daily 30 tablet 2   • folic acid (FOLVITE) 1 mg tablet Take 1 tablet (1 mg total) by mouth daily 90 tablet 3   • gabapentin (Neurontin) 300 mg capsule Take 1 capsule (300 mg total) by mouth daily at bedtime PRN 90 capsule 3   • glucagon 1 MG injection 1 mg     • Glucagon HCl (Glucagon Emergency) 1 MG/ML SOLR INJECT 1 MG AS DIRECTED AS NEEDED (WHEN PASSED OUT FROM LOW BLOOD SUGAR)       • hydroxychloroquine (PLAQUENIL) 200 mg tablet Take 1 tablet (200 mg total) by mouth 2 (two) times a day with meals 180 tablet 3   • Insulin Pen Needle (BD PEN NEEDLE NASIM U/F) 32G X 4 MM MISC by Does not apply route 4 (four) times a day for 180 days 400 each 3   • levonorgestrel-ethinyl estradiol (Altavera) 0 15-30 MG-MCG per tablet Take 1 tablet by mouth daily 84 tablet 3   • levothyroxine 25 mcg tablet Take 1 tablet (25 mcg total) by mouth daily 60 tablet 0   • metFORMIN (GLUCOPHAGE-XR) 500 mg 24 hr tablet 1,000 mg 2 (two) times a day with meals       • methotrexate 50 MG/2ML injection Inject 0 8 mL (20 mg total) under the skin once a week 4 mL 5   • NovoLOG 100 UNIT/ML injection INJECT 3 TIMES A DAY WITH MEALS BASED ON ICR 4 AND ISF 30 FOR TDD 90 UNITS DAILY VIA INSULIN PUMP     • OneTouch Verio test " "strip USE 4 TIMES A DAY BEFORE MEALS AND AT BEDTIME     • pancrelipase, Lip-Prot-Amyl, (CREON) 12,000 units capsule Take 36,000 units of lipase by mouth 3 (three) times a day with meals Take 3 capsules prior to each meal and 1 prior to snack 120 capsule 3   • promethazine (PHENERGAN) 12 5 MG tablet Take 1 tablet (12 5 mg total) by mouth every 6 (six) hours as needed for nausea or vomiting 30 tablet 0   • Tresiba FlexTouch 100 units/mL injection pen INJECT 34 UNITS DAILY IN THE EVENT OF PUMP FAILURE E10 65       No current facility-administered medications for this visit  Allergies: Allergies   Allergen Reactions   • Insulin Glargine Other (See Comments)     LANTUS BRAND -Burning and redness under skin       Physical Exam:     /62 (BP Location: Left arm, Patient Position: Sitting, Cuff Size: Standard)   Pulse (!) 52   Temp (!) 97 2 °F (36 2 °C) (Tympanic)   Resp 16   Ht 5' 2 99\" (1 6 m)   Wt 58 5 kg (129 lb)   SpO2 98%   BMI 22 86 kg/m²     Physical Exam  Vitals reviewed  Constitutional:       General: She is awake  She is not in acute distress  Appearance: Normal appearance  She is well-developed, well-groomed and normal weight  She is not ill-appearing  HENT:      Head: Normocephalic  Right Ear: Hearing, tympanic membrane, ear canal and external ear normal       Left Ear: Hearing, tympanic membrane, ear canal and external ear normal       Nose: Nose normal       Mouth/Throat:      Pharynx: Uvula midline  Eyes:      General: Lids are normal       Conjunctiva/sclera: Conjunctivae normal    Neck:      Thyroid: No thyroid mass or thyromegaly  Vascular: Normal carotid pulses  No carotid bruit or JVD  Cardiovascular:      Rate and Rhythm: Normal rate and regular rhythm  Pulses: Normal pulses  Heart sounds: Normal heart sounds, S1 normal and S2 normal    Pulmonary:      Effort: Pulmonary effort is normal       Breath sounds: Normal breath sounds     Abdominal:      " General: Abdomen is flat  Bowel sounds are normal       Palpations: Abdomen is soft  Tenderness: There is no abdominal tenderness  Musculoskeletal:         General: Normal range of motion  Cervical back: Full passive range of motion without pain  Right lower leg: No edema  Left lower leg: No edema  Lymphadenopathy:      Head:      Right side of head: No submental, submandibular, tonsillar, preauricular, posterior auricular or occipital adenopathy  Left side of head: No submental, submandibular, tonsillar, preauricular, posterior auricular or occipital adenopathy  Cervical: No cervical adenopathy  Skin:     General: Skin is warm and dry  Comments: Patchy hyperpigmentation on back, chest   lights up red/purple on woods lamp  Neurological:      Mental Status: She is alert and oriented to person, place, and time  Sensory: No sensory deficit  Motor: Weakness (generalized 4/5) present  Deep Tendon Reflexes:      Reflex Scores:       Patellar reflexes are 2+ on the right side and 2+ on the left side  Psychiatric:         Attention and Perception: Attention normal          Mood and Affect: Mood is depressed  Speech: Speech normal          Behavior: Behavior normal  Behavior is cooperative  Thought Content:  Thought content normal          Cognition and Memory: Cognition normal          Judgment: Judgment normal           Yane Alves, [address]  [facility]

## 2023-06-20 NOTE — ASSESSMENT & PLAN NOTE
a1c normal though she has highs and lows often  Managed by endo at   Uses insulin pump  Scheduled for eye exam next month    Continue management and f/u   Lab Results   Component Value Date    HGBA1C 6 4 (H) 06/05/2023

## 2023-06-20 NOTE — ASSESSMENT & PLAN NOTE
Brown pigmentation on back, slightly on chest   Red/purple under woods lamp  It is asymptomatic  Referred to dermatology

## 2023-06-20 NOTE — ASSESSMENT & PLAN NOTE
Managed by psych  Was on zoloft but has had increased SI  She denies plans to harm herself  She spoke with her therapist who spoke to her psychiatrist and they are aware she stopped this yesterday  She is scheduled with both for f/u this week and next week respectively

## 2023-06-23 ENCOUNTER — OFFICE VISIT (OUTPATIENT)
Dept: PSYCHIATRY | Facility: CLINIC | Age: 28
End: 2023-06-23
Payer: COMMERCIAL

## 2023-06-23 DIAGNOSIS — F41.1 GENERALIZED ANXIETY DISORDER WITH PANIC ATTACKS: ICD-10-CM

## 2023-06-23 DIAGNOSIS — F41.0 GENERALIZED ANXIETY DISORDER WITH PANIC ATTACKS: ICD-10-CM

## 2023-06-23 DIAGNOSIS — F43.12 CHRONIC POST-TRAUMATIC STRESS DISORDER (PTSD): Primary | ICD-10-CM

## 2023-06-23 DIAGNOSIS — F42.2 MIXED OBSESSIONAL THOUGHTS AND ACTS: ICD-10-CM

## 2023-06-23 PROCEDURE — 99213 OFFICE O/P EST LOW 20 MIN: CPT | Performed by: PSYCHIATRY & NEUROLOGY

## 2023-06-23 PROCEDURE — 90833 PSYTX W PT W E/M 30 MIN: CPT | Performed by: PSYCHIATRY & NEUROLOGY

## 2023-06-23 RX ORDER — CLONAZEPAM 0.5 MG/1
0.5 TABLET ORAL DAILY
Qty: 30 TABLET | Refills: 2 | Status: SHIPPED | OUTPATIENT
Start: 2023-06-23

## 2023-06-23 NOTE — PSYCH
Visit Time    Visit Start Time: 3:00  Visit Stop Time: 3:30  Total Visit Duration: 30 minutes    Subjective:Medication Management    Patient ID: Pema Seals is a 34 y o  female with Bipolar do     HPI ROS Appetite Changes and Sleep: normal appetite, normal energy level, no weight change and normal number of sleep hours   Since last seen Yury Cedillo has been off medications except for Clonazepam prn  She stated she still struggles regulating her emotions and feels anxious often    She stated she will not start a mood stabilizer because she did not have a good response in the past and she felt she had the same struggles with her mood even when she was taking medications  She tried Sertraline but she experienced increased mood lability and SI and she stopped the medication after 3 weeks       She stated she decided to move back with her parents to be able to closely follow up with all her specialists and she stated her boyfriend will be moving back to [place] the end of August  BF is applying for his green card  She stated 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[employer], processing refunds for several institutions  She stated that after she started working the amount of stress has caused her physical and emotional health to decline  She is planning to apply for STD and take at least 1 month off work  She stated the stress has caused her to oversleep and she has problems with attention and concentration and was making multiple mistakes at work  She is considering either STD to LTD or switching to part time employment but she needs medical benefits due to her multiple health issues  She continues to meet with her counselor on a regular basis  Agrees to continue Clonazepam prn and will schedule follow up in 6-8 weeks      Review Of Systems:     Mood Anxiety, Depression and Emotional Lability   Behavior Impulsive Behavior   Thought Content Disturbing Thoughts, Feelings   General Relationship Problems, Emotional Problems and Decreased Functioning   Personality Normal   Other Psych Symptoms Normal   Constitutional Negative   ENT Negative   Cardiovascular Negative   Respiratory Negative   Gastrointestinal Negative   Genitourinary Negative   Musculoskeletal Negative   Integumentary Negative   Neurological Negative   Endocrine Normal    Other Symptoms Normal        Laboratory Results:   Recent Labs (last 12 months):   Appointment on 06/05/2023   Component Date Value   • Hemoglobin A1C 06/05/2023 6 4 (H)    • EAG 06/05/2023 137    • TSH 3RD GENERATON 06/05/2023 1 617    Hospital Outpatient Visit on 03/23/2023   Component Date Value   • POC Glucose 03/23/2023 107    • EXT Preg Test, Ur 03/23/2023 Negative    • Control 03/23/2023 Valid    • Case Report 03/23/2023                      Value:Surgical Pathology Report                         Case: [id number]                                   Authorizing Provider:  Ally Garcia MD            Collected:           03/23/2023 1429              Ordering Location:     [address] [place] [address]        Received:            03/23/2023 [address] [place] [address] [facility]                                                    Pathologist:           Joanna Boucher MD                                                          Specimens:   A) - Duodenum, cold bx-duodenum                                                                     B) - Stomach, antrum cold bx                                                                        C) - Stomach, body cold bx                                                                • Final Diagnosis 03/23/2023                      Value: This result contains rich text formatting which cannot be displayed here  • Additional Information 03/23/2023                      Value: This result contains rich text formatting which cannot be displayed here  • Gross Description 03/23/2023                      Value: This result contains rich text formatting which cannot be displayed here     • Clinical Information 03/23/2023                      Value:History of intestinal metaplasia   • POC Glucose 03/23/2023 131    Appointment on 03/01/2023   Component Date Value   • Pancreatic Elastase-1 03/01/2023 135 (L)    • H pylori Ag, Stl 03/01/2023 Negative    Appointment on 02/27/2023   Component Date Value   • CRP 02/27/2023 <3 0    • Sed Rate 02/27/2023 12    • C4, COMPLEMENT 02/27/2023 20 0    • C3 Complement 02/27/2023 109 0    • ds DNA Ab 02/27/2023 <1    • WBC 02/27/2023 5 50    • RBC 02/27/2023 4 44    • Hemoglobin 02/27/2023 13 4    • Hematocrit 02/27/2023 40 1    • MCV 02/27/2023 90    • MCH 02/27/2023 30 2    • MCHC 02/27/2023 33 4    • RDW 02/27/2023 13 1    • MPV 02/27/2023 12 6    • Platelets 97/31/4153 222    • nRBC 02/27/2023 0    • Neutrophils Relative 02/27/2023 44    • Immat GRANS % 02/27/2023 0    • Lymphocytes Relative 02/27/2023 44    • Monocytes Relative 02/27/2023 9    • Eosinophils Relative 02/27/2023 2    • Basophils Relative 02/27/2023 1    • Neutrophils Absolute 02/27/2023 2 49    • Immature Grans Absolute 02/27/2023 0 01    • Lymphocytes Absolute 02/27/2023 2 41    • Monocytes Absolute 02/27/2023 0 48    • Eosinophils Absolute 02/27/2023 0 08    • Basophils Absolute 02/27/2023 0 03    • Sodium 02/27/2023 137    • Potassium 02/27/2023 3 8    • Chloride 02/27/2023 108    • CO2 02/27/2023 25    • ANION GAP 02/27/2023 4    • BUN 02/27/2023 13    • Creatinine 02/27/2023 0 94    • Glucose 02/27/2023 84    • Calcium 02/27/2023 9 8    • AST 02/27/2023 18    • ALT 02/27/2023 16    • Alkaline Phosphatase 02/27/2023 50    • Total Protein 02/27/2023 8 0    • Albumin 02/27/2023 4 4    • Total Bilirubin 02/27/2023 0 84    • eGFR 02/27/2023 83    Appointment on 02/02/2023   Component Date Value   • Cholesterol 02/02/2023 200    • Triglycerides 02/02/2023 72    • HDL, Direct 02/02/2023 51    • LDL Calculated 02/02/2023 135 (H)    • Non-HDL-Chol (CHOL-HDL) 02/02/2023 149    • Hemoglobin A1C 02/02/2023 6 3 (H)    • EAG 02/02/2023 134    • TSH 3RD GENERATON 02/02/2023 1 570    Transcribe Orders on 02/02/2023   Component Date Value   • Creatinine, Ur 02/02/2023 174 0    • Albumin,U,Random 02/02/2023 10 2    • Albumin Creat Ratio 02/02/2023 6    Office Visit on 10/27/2022   Component Date Value   • Color, UA 02/27/2023 Yellow    • Clarity, UA 02/27/2023 Turbid    • Specific Gravity, UA 02/27/2023 1 027    • pH, UA 02/27/2023 7 5    • Leukocytes, UA 02/27/2023 Small (A)    • Nitrite, UA 02/27/2023 Negative    • Protein, UA 02/27/2023 70 (1+) (A)    • Glucose, UA 02/27/2023 Negative    • Ketones, UA 02/27/2023 Negative    • Urobilinogen, UA 02/27/2023 3 0 (A)    • Bilirubin, UA 02/27/2023 Negative    • Occult Blood, UA 02/27/2023 Negative    • RBC, UA 02/27/2023 None Seen    • WBC, UA 02/27/2023 2-4 (A)    • Epithelial Cells 02/27/2023 Occasional    • Bacteria, UA 02/27/2023 Moderate (A)    • MUCUS THREADS 02/27/2023 Innumerable (A)    • Ca Oxalate Sonia, UA 02/27/2023 Occasional (A)    • Creatinine, Ur 02/27/2023 295 0    • Protein Urine Random 02/27/2023 11    • Prot/Creat Ratio, Ur 02/27/2023 0 04    Appointment on 10/26/2022   Component Date Value   • Total Bilirubin 10/26/2022 1 22 (H)    • Bilirubin, Direct 10/26/2022 0 27 (H)    • Alkaline Phosphatase 10/26/2022 48    • AST 10/26/2022 11    • ALT 10/26/2022 19    • Total Protein 10/26/2022 7 4    • Albumin 10/26/2022 4 0    • Endomysial IgA 10/26/2022 Negative    • Tissue Transglut Ab IGG 10/26/2022 <2    • Protime 10/26/2022 13 3    • INR 10/26/2022 0 99    • Vit D, 25-Hydroxy 10/26/2022 31 9    • Iron Saturation 10/26/2022 42    • TIBC 10/26/2022 323    • Iron 10/26/2022 136    • Ferritin 10/26/2022 35    Office Visit on 10/26/2022   Component Date Value   • Urine Culture 10/26/2022 No Growth <1000 cfu/mL    • URINE HCG 10/26/2022 Neg    Appointment on 10/12/2022   Component Date Value   • Total Bilirubin 10/12/2022 1 29 (H)    • Bilirubin, Direct 10/12/2022 0 20    • Alkaline Phosphatase 10/12/2022 53    • AST 10/12/2022 15    • ALT 10/12/2022 22    • Total Protein 10/12/2022 7 7    • Albumin 10/12/2022 4 0    There may be more visits with results that are not included         Substance Abuse History:  Social History     Substance and Sexual Activity   Drug Use Never       Family Psychiatric History:   Family History   Problem Relation Age of Onset   • Hypertension Mother    • Migraines Mother         Headache   • Diabetes type II Mother    • Varicose Veins Mother    • Hyperlipidemia Mother    • Diabetes Mother    • Arthritis Mother    • Depression Mother    • Hearing loss Mother    • Anxiety disorder Mother    • Cholelithiasis Father    • Hypertension Father    • Sarcoidosis Father         Liver   • Hyperlipidemia Father    • Diabetes Father    • Coronary artery disease Father    • Nephrolithiasis Father    • Cirrhosis Father    • Alcohol abuse Father    • Thyroid disease Sister    • Hashimoto's thyroiditis Sister    • Cancer Family    • Diabetes Family    • Hypertension Family    • Alcohol abuse Brother        The following portions of the patient's history were reviewed and updated as appropriate: allergies, current medications, past family history, past medical history, past social history, past surgical history and problem list     Social History     Socioeconomic History   • Marital status: /Civil Union     Spouse name: Not on file   • Number of children: 0   • Years of education: Not on file   • Highest education level: Associate degree: academic program   Occupational History   • Occupation: unemployed   Tobacco Use   • Smoking status: Never     Passive exposure: Never   • Smokeless tobacco: Never   • Tobacco comments:     Tobacco smoke exposure (Father smokes cigars)   Vaping Use   • Vaping Use: Never used   Substance and Sexual Activity   • Alcohol use: Not Currently   • Drug use: Never   • Sexual activity: Yes     Partners: Male     Birth control/protection: Condom Male, OCP   Other Topics Concern   • Not on file   Social History Narrative    Student at [employer] a poor diet; low in vegetables, high in sweets    Dental care, regularly    Lives with parents    Sleeps 8-10 hours a day     Social Determinants of Health     Financial Resource Strain: Medium Risk (4/12/2021)    Overall Financial Resource Strain (CARDIA)    • Difficulty of Paying Living Expenses: Somewhat hard   Food Insecurity: Not on file   Transportation Needs: Not on file   Physical Activity: Insufficiently Active (8/7/2020)    Exercise Vital Sign    • Days of Exercise per Week: 7 days    • Minutes of Exercise per Session: 10 min   Stress: Stress Concern Present (8/7/2020)    Naif7 Harpreet Casey    • Feeling of Stress : Rather much   Social Connections: Unknown (8/7/2020)    Social Connection and Isolation Panel [NHANES]    • Frequency of Communication with Friends and Family: More than three times a week    • Frequency of Social Gatherings with Friends and Family: Not on file    • Attends Mandaen Services: Not on file    • Active Member of Clubs or Organizations: No    • Attends Club or Organization Meetings: Never    • Marital Status: Never    Intimate Partner Violence: Not At Risk (8/7/2020)    Humiliation, Afraid, Rape, and Kick questionnaire    • Fear of Current or Ex-Partner: No    • Emotionally Abused: No    • Physically Abused: No    • Sexually Abused: No   Housing Stability: Not on file     Social History     Social History Narrative    Student at [employer] a poor diet; low in vegetables, high in sweets    Dental care, regularly    Lives with parents    Sleeps 8-10 hours a day       Objective:       Mental status:  Appearance calm and cooperative , adequate hygiene and grooming and good eye contact    Mood dysphoric   Affect affect was constricted   Speech a normal rate and fluent   Thought Processes coherent/organized and normal thought processes   Hallucinations no hallucinations present    Thought Content no delusions   Abnormal Thoughts no suicidal thoughts  and no homicidal thoughts    Orientation  oriented to person and place and time   Remote Memory short term memory intact and long term memory intact   Attention Span concentration intact   Intellect Appears to be of Average Intelligence   Insight Limited insight   Judgement judgment was limited   Muscle Strength Muscle strength and tone were normal and Normal gait    Language no difficulty naming common objects and no difficulty repeating a phrase    Fund of Knowledge displays adequate knowledge of current events, adequate fund of knowledge regarding past history and adequate fund of knowledge regarding vocabulary      Assessment/Plan:       Diagnoses and all orders for this visit:    Generalized anxiety disorder with panic attacks  -     clonazePAM (KlonoPIN) 0 5 mg tablet; Take 1 tablet (0 5 mg total) by mouth daily            Treatment Recommendations- Risks Benefits      Immediate Medical/Psychiatric/Psychotherapy Treatments and Any Precautions: continue Clonazepam prn  Risks, Benefits And Possible Side Effects Of Medications:  {PSYCH RISK, BENEFITS AND POSSIBLE SIDE EFFECTS (Optional):71625    Controlled Medication Discussion: Discussed with patient Black Box warning on concurrent use of benzodiazepines and opioid medications including sedation, respiratory depression, coma and death  Patient understands the risk of treatment with benzodiazepines in addition to opioids and wants to continue taking those medications  , Discussed with patient the risks of sedation, respiratory depression, impairment of ability to drive and potential for abuse and addiction related to treatment with benzodiazepine medications   The patient understands risk of treatment with benzodiazepine medications, agrees to not drive if feels impaired and agrees to take medications as prescribed  and The patient has been filling controlled prescriptions on time as prescribed to [facility]       Psychotherapy Provided: Individual psychotherapy provided  Individual psychotherapy provided: Yes  Counseling was provided during the session today for 16 minutes  Medications, treatment progress and treatment plan reviewed with Jenny  Medication education provided to Jenny  Goals discussed during in session: improve control of mood stability  Recent stressor including  family problems, family conflict, family issues, school stress, health issues, medical problems, limited support, social difficulties, everyday stressors and ongoing anxiety discussed with Justin Belle  Coping strategies including compliance with medications, contacting a therapist, deep/slow breathing, eliminating avoidance, engaging in previously avoided activities, exercising, getting into a good routine, improving self-esteem, increasing energy, increasing interest in usual activities, increasing motivation, increasing social interaction, keeping busy at home, maintain healthy diet, maintain heathy sleeping hygiene and maintain positive attitude reviewed with Justin Belle  Importance of medication and treatment compliance reviewed with Jenny  Educated on importance of medication and treatment compliance  Importance of follow up with family physician for medical issues reviewed with Jenny    Supportive therapy provided

## 2023-06-27 ENCOUNTER — SOCIAL WORK (OUTPATIENT)
Dept: BEHAVIORAL/MENTAL HEALTH CLINIC | Facility: CLINIC | Age: 28
End: 2023-06-27
Payer: COMMERCIAL

## 2023-06-27 DIAGNOSIS — F41.0 GENERALIZED ANXIETY DISORDER WITH PANIC ATTACKS: ICD-10-CM

## 2023-06-27 DIAGNOSIS — F41.1 GENERALIZED ANXIETY DISORDER WITH PANIC ATTACKS: ICD-10-CM

## 2023-06-27 DIAGNOSIS — F43.12 CHRONIC POST-TRAUMATIC STRESS DISORDER (PTSD): ICD-10-CM

## 2023-06-27 DIAGNOSIS — F42.2 MIXED OBSESSIONAL THOUGHTS AND ACTS: Primary | ICD-10-CM

## 2023-06-27 DIAGNOSIS — F31.62 BIPOLAR DISORDER, CURRENT EPISODE MIXED, MODERATE (HCC): ICD-10-CM

## 2023-06-27 PROCEDURE — 90837 PSYTX W PT 60 MINUTES: CPT | Performed by: SOCIAL WORKER

## 2023-06-27 NOTE — PSYCH
Behavioral Health Psychotherapy Progress Note    Psychotherapy Provided: Individual Psychotherapy     1  Mixed obsessional thoughts and acts        2  Generalized anxiety disorder with panic attacks        3  Chronic post-traumatic stress disorder (PTSD)        4  Bipolar disorder, current episode mixed, moderate ([address] )            Goals addressed in session: Goal 1 and Goal 2     DATA:     Met with Jenny individually  Disclosed suicidal thoughts - denied self insulin for 4 days - d/c'd Zoloft  Since d/c, feels 'fog has lifted'  Experienced 'mental breakdown' - called mom but dad was only available - mom returned from trip but Stan Garcia 'got her act together' - work was trigger 'that was the straw'  Session focused upon completion of STD forms through work - accepted  Stan Garcia also discussed several concerns with manager's boss as they asked feedback  Feels changes are already being made and another colleague is being fired due to harrassment  Jenny's grandmother  6/15  Uncle became aggressive towards several family members at hospital - removed more than once  Renetta Dalton remains in [place] attending summer classes - very overwhelmed  Stan Garcia visited last week  Denied SI    During this session, this clinician used the following therapeutic modalities: Cognitive Behavioral Therapy and Supportive Psychotherapy    Substance Abuse was not addressed during this session  If the client is diagnosed with a co-occurring substance use disorder, please indicate any changes in the frequency or amount of use:   Stage of change for addressing substance use diagnoses: No substance use/Not applicable    ASSESSMENT:  Farida Junior presents with a Euthymic/ normal and Dysthymic mood  her affect is Flat, which is congruent, with her mood and the content of the session  The client has made progress on their goals       Farida Junior presents with a low risk of suicide, low risk of self-harm, and low risk of harm to "others  For any risk assessment that surpasses a \"low\" rating, a safety plan must be developed  A safety plan was indicated: no  If yes, describe in detail     PLAN: Between sessions, Alexandro Mckinney will Monitor blood sugars, mood - contact therapist if SI returns    At the next session, the therapist will use Cognitive Behavioral Therapy and Supportive Psychotherapy to address STD, work  MH balance, family relationships  Behavioral Health Treatment Plan and Discharge Planning: Alexandro Mckinney is aware of and agrees to continue to work on their treatment plan  They have identified and are working toward their discharge goals   no    Visit start and stop times:    06/27/23  Start Time: 1655  Stop Time: 1750  Total Visit Time: 55 minutes  "

## 2023-06-28 ENCOUNTER — TELEPHONE (OUTPATIENT)
Dept: DERMATOLOGY | Facility: CLINIC | Age: 28
End: 2023-06-28

## 2023-06-28 NOTE — TELEPHONE ENCOUNTER
Rec'd call from patient requesting new patient appt  Dx: SKIN DISCOLORATION: CHEST/BACK    Wait list process was explained  Understanding was verbalized  Created wait list for patient

## 2023-07-06 ENCOUNTER — TELEPHONE (OUTPATIENT)
Dept: PSYCHIATRY | Facility: CLINIC | Age: 28
End: 2023-07-06

## 2023-07-06 NOTE — TELEPHONE ENCOUNTER
Medical information request form was received from [employer]. Writer returned it to sender due to no ADINA enclosed.

## 2023-07-07 NOTE — TELEPHONE ENCOUNTER
Patient was in office today to sign the needed ADINA for medical record mention in this thread. It is scan attached in this encounter and will be put in the MR bill bin. Per Shasha at [facility], procedure on 07/26/2018 approved #[id number] from 07/26/2018-07/28/2018.

## 2023-07-07 NOTE — TELEPHONE ENCOUNTER
Writer contacted pt to inform her that we received a medical information request from [employer] but there is no ADINA enclosed. She stated that she will come to the office today to sign one.

## 2023-07-10 ENCOUNTER — TELEPHONE (OUTPATIENT)
Dept: PSYCHIATRY | Facility: CLINIC | Age: 28
End: 2023-07-10

## 2023-07-10 NOTE — TELEPHONE ENCOUNTER
Case Management received a referral from Dr. Owen Baron to assist with [facility] paperwork for Ana María Cintron. Requested a call back from Ana María Cintron to see when her start date and expected RTW date are.

## 2023-07-10 NOTE — TELEPHONE ENCOUNTER
Nahid Key left a message for CM requesting a call back. Writer returned patient's call. Jenny's leave started on 7/3/23 and has a tentative RTW date of 8/28/23. Patient will discuss ELISSA with Dr. Da Romeo during her next appointment.      LA was emailed to Dr. Da Romeo for signature and approval.

## 2023-07-11 ENCOUNTER — SOCIAL WORK (OUTPATIENT)
Dept: BEHAVIORAL/MENTAL HEALTH CLINIC | Facility: CLINIC | Age: 28
End: 2023-07-11
Payer: COMMERCIAL

## 2023-07-11 DIAGNOSIS — F41.0 GENERALIZED ANXIETY DISORDER WITH PANIC ATTACKS: ICD-10-CM

## 2023-07-11 DIAGNOSIS — F42.2 MIXED OBSESSIONAL THOUGHTS AND ACTS: Primary | ICD-10-CM

## 2023-07-11 DIAGNOSIS — F31.62 BIPOLAR DISORDER, CURRENT EPISODE MIXED, MODERATE (HCC): ICD-10-CM

## 2023-07-11 DIAGNOSIS — F41.1 GENERALIZED ANXIETY DISORDER WITH PANIC ATTACKS: ICD-10-CM

## 2023-07-11 DIAGNOSIS — F43.12 CHRONIC POST-TRAUMATIC STRESS DISORDER (PTSD): ICD-10-CM

## 2023-07-11 PROCEDURE — 90834 PSYTX W PT 45 MINUTES: CPT | Performed by: SOCIAL WORKER

## 2023-07-11 NOTE — TELEPHONE ENCOUNTER
Medical record request was received from [employer] [facility] [employer] for time frame not stated.  Will be placed in Dr. Nettie Rizzo by the end of the day to be signed by:    Dr. Taras Manning

## 2023-07-11 NOTE — PSYCH
Behavioral Health Psychotherapy Progress Note    Psychotherapy Provided: Individual Psychotherapy     1. Mixed obsessional thoughts and acts        2. Generalized anxiety disorder with panic attacks        3. Chronic post-traumatic stress disorder (PTSD)        4. Bipolar disorder, current episode mixed, moderate ([address])            Goals addressed in session: Goal 1 and Goal 2     DATA:     Met with Jenny individually. Feels less anxiety since taking temporary leave from work. Plans to return in Sept part time. In hospital for DKA - pump . Health has been 'better as pain is tolerable due to Methotrexate. Session focused upon loss in many circumstances this year thus far. Sharon Olmos was tearful which is progress for her to let her emotions out. Denied SI    During this session, this clinician used the following therapeutic modalities: Cognitive Behavioral Therapy and Supportive Psychotherapy    Substance Abuse was not addressed during this session. If the client is diagnosed with a co-occurring substance use disorder, please indicate any changes in the frequency or amount of use: . Stage of change for addressing substance use diagnoses: No substance use/Not applicable    ASSESSMENT:  Kacy Monreal presents with a Anxious and Dysthymic mood. her affect is Normal range and intensity, Constricted and Tearful, which is congruent, with her mood and the content of the session. The client has made progress on their goals. Kacy Monreal presents with a low risk of suicide, low risk of self-harm, and low risk of harm to others. For any risk assessment that surpasses a "low" rating, a safety plan must be developed. A safety plan was indicated: no  If yes, describe in detail     PLAN: Between sessions, Kacy Monreal will attempt to exercise, help parents with business and take time for self care.  At the next session, the therapist will use Cognitive Behavioral Therapy and Supportive Psychotherapy to address loss, relationship with sister, health. Behavioral Health Treatment Plan and Discharge Planning: Michael Albarran is aware of and agrees to continue to work on their treatment plan. They have identified and are working toward their discharge goals.  yes    Visit start and stop times:    07/11/23  Start Time: 1600  Stop Time: 1650  Total Visit Time: 50 minutes

## 2023-07-20 ENCOUNTER — APPOINTMENT (OUTPATIENT)
Dept: LAB | Age: 28
End: 2023-07-20
Payer: COMMERCIAL

## 2023-07-20 DIAGNOSIS — K04.7 DENTAL ABSCESS: ICD-10-CM

## 2023-07-20 DIAGNOSIS — E10.649 UNCONTROLLED TYPE 1 DIABETES MELLITUS WITH HYPOGLYCEMIA WITHOUT COMA (HCC): ICD-10-CM

## 2023-07-20 DIAGNOSIS — E06.3 HASHIMOTO'S DISEASE: ICD-10-CM

## 2023-07-20 DIAGNOSIS — M35.9 UNDIFFERENTIATED CONNECTIVE TISSUE DISEASE (HCC): ICD-10-CM

## 2023-07-20 DIAGNOSIS — R53.83 MALAISE AND FATIGUE: ICD-10-CM

## 2023-07-20 DIAGNOSIS — R53.81 MALAISE AND FATIGUE: ICD-10-CM

## 2023-07-20 LAB
ALBUMIN SERPL BCP-MCNC: 3.8 G/DL (ref 3.5–5)
ALP SERPL-CCNC: 49 U/L (ref 46–116)
ALT SERPL W P-5'-P-CCNC: 15 U/L (ref 12–78)
ANION GAP SERPL CALCULATED.3IONS-SCNC: 9 MMOL/L
AST SERPL W P-5'-P-CCNC: 8 U/L (ref 5–45)
BASOPHILS # BLD AUTO: 0.03 THOUSANDS/ÂΜL (ref 0–0.1)
BASOPHILS NFR BLD AUTO: 1 % (ref 0–1)
BILIRUB SERPL-MCNC: 1.01 MG/DL (ref 0.2–1)
BUN SERPL-MCNC: 9 MG/DL (ref 5–25)
CALCIUM SERPL-MCNC: 8.9 MG/DL (ref 8.3–10.1)
CHLORIDE SERPL-SCNC: 108 MMOL/L (ref 96–108)
CO2 SERPL-SCNC: 22 MMOL/L (ref 21–32)
CREAT SERPL-MCNC: 0.94 MG/DL (ref 0.6–1.3)
EOSINOPHIL # BLD AUTO: 0.12 THOUSAND/ÂΜL (ref 0–0.61)
EOSINOPHIL NFR BLD AUTO: 2 % (ref 0–6)
ERYTHROCYTE [DISTWIDTH] IN BLOOD BY AUTOMATED COUNT: 13.4 % (ref 11.6–15.1)
GFR SERPL CREATININE-BSD FRML MDRD: 83 ML/MIN/1.73SQ M
GLUCOSE P FAST SERPL-MCNC: 133 MG/DL (ref 65–99)
HCT VFR BLD AUTO: 35.7 % (ref 34.8–46.1)
HGB BLD-MCNC: 11.5 G/DL (ref 11.5–15.4)
IGA SERPL-MCNC: 247 MG/DL (ref 70–400)
IGG SERPL-MCNC: 855 MG/DL (ref 700–1600)
IGM SERPL-MCNC: 138 MG/DL (ref 40–230)
IMM GRANULOCYTES # BLD AUTO: 0.02 THOUSAND/UL (ref 0–0.2)
IMM GRANULOCYTES NFR BLD AUTO: 0 % (ref 0–2)
LYMPHOCYTES # BLD AUTO: 3.06 THOUSANDS/ÂΜL (ref 0.6–4.47)
LYMPHOCYTES NFR BLD AUTO: 53 % (ref 14–44)
MCH RBC QN AUTO: 29.6 PG (ref 26.8–34.3)
MCHC RBC AUTO-ENTMCNC: 32.2 G/DL (ref 31.4–37.4)
MCV RBC AUTO: 92 FL (ref 82–98)
MONOCYTES # BLD AUTO: 0.52 THOUSAND/ÂΜL (ref 0.17–1.22)
MONOCYTES NFR BLD AUTO: 9 % (ref 4–12)
NEUTROPHILS # BLD AUTO: 2.03 THOUSANDS/ÂΜL (ref 1.85–7.62)
NEUTS SEG NFR BLD AUTO: 35 % (ref 43–75)
NRBC BLD AUTO-RTO: 0 /100 WBCS
PLATELET # BLD AUTO: 220 THOUSANDS/UL (ref 149–390)
PMV BLD AUTO: 12.4 FL (ref 8.9–12.7)
POTASSIUM SERPL-SCNC: 3.6 MMOL/L (ref 3.5–5.3)
PROT SERPL-MCNC: 7.1 G/DL (ref 6.4–8.4)
RBC # BLD AUTO: 3.88 MILLION/UL (ref 3.81–5.12)
SODIUM SERPL-SCNC: 139 MMOL/L (ref 135–147)
WBC # BLD AUTO: 5.78 THOUSAND/UL (ref 4.31–10.16)

## 2023-07-20 PROCEDURE — 80053 COMPREHEN METABOLIC PANEL: CPT

## 2023-07-20 PROCEDURE — 82784 ASSAY IGA/IGD/IGG/IGM EACH: CPT

## 2023-07-20 PROCEDURE — 86317 IMMUNOASSAY INFECTIOUS AGENT: CPT

## 2023-07-20 PROCEDURE — 36415 COLL VENOUS BLD VENIPUNCTURE: CPT

## 2023-07-20 PROCEDURE — 82785 ASSAY OF IGE: CPT

## 2023-07-20 PROCEDURE — 85025 COMPLETE CBC W/AUTO DIFF WBC: CPT

## 2023-07-21 ENCOUNTER — SOCIAL WORK (OUTPATIENT)
Dept: BEHAVIORAL/MENTAL HEALTH CLINIC | Facility: CLINIC | Age: 28
End: 2023-07-21

## 2023-07-21 DIAGNOSIS — F41.0 GENERALIZED ANXIETY DISORDER WITH PANIC ATTACKS: ICD-10-CM

## 2023-07-21 DIAGNOSIS — F43.12 CHRONIC POST-TRAUMATIC STRESS DISORDER (PTSD): ICD-10-CM

## 2023-07-21 DIAGNOSIS — F31.62 BIPOLAR DISORDER, CURRENT EPISODE MIXED, MODERATE (HCC): ICD-10-CM

## 2023-07-21 DIAGNOSIS — F42.2 MIXED OBSESSIONAL THOUGHTS AND ACTS: Primary | ICD-10-CM

## 2023-07-21 DIAGNOSIS — F41.1 GENERALIZED ANXIETY DISORDER WITH PANIC ATTACKS: ICD-10-CM

## 2023-07-21 LAB — TOTAL IGE SMQN RAST: 25.1 KU/L (ref 0–113)

## 2023-07-21 NOTE — PSYCH
Behavioral Health Psychotherapy Progress Note    Psychotherapy Provided: Individual Psychotherapy     1. Mixed obsessional thoughts and acts        2. Generalized anxiety disorder with panic attacks        3. Chronic post-traumatic stress disorder (PTSD)        4. Bipolar disorder, current episode mixed, moderate ([address])            Goals addressed in session: Goal 1 and Goal 2     DATA:     Met with Jenny individually. Happy as new insulin pump in. Dog passed - very traumatic - happened at home. Feels anxiety has decreased since not working - feels more in control of sugars and work is also seeing how much responsibility Shabana Salvador was left with. Discussed Wedding dress fitting - went by herself as parents didn't come as sister arrived unexpectedly. Shabana Salvador has been setting firm boundaries with sister regarding wedding, not having her come, participate in any decisions or events. Denied SI    During this session, this clinician used the following therapeutic modalities: Cognitive Behavioral Therapy and Supportive Psychotherapy     Substance Abuse was not addressed during this session. If the client is diagnosed with a co-occurring substance use disorder, please indicate any changes in the frequency or amount of use: . Stage of change for addressing substance use diagnoses: No substance use/Not applicable    ASSESSMENT:  Jass Lunsford presents with a Euthymic/ normal mood. her affect is Normal range and intensity, which is congruent, with her mood and the content of the session. The client has made progress on their goals. Jass Lunsford presents with a low risk of suicide, low risk of self-harm, and low risk of harm to others. For any risk assessment that surpasses a "low" rating, a safety plan must be developed. A safety plan was indicated: no  If yes, describe in detail     PLAN: Between sessions, Jass Lunsford will Conitnue to set boundaries, remain on STD, monitor health.  At the next session, the therapist will use Cognitive Behavioral Therapy and Supportive Psychotherapy to address  Wedding planning, family relationships, MH struggles preventing work right now. Behavioral Health Treatment Plan and Discharge Planning: Terrielibby Rich is aware of and agrees to continue to work on their treatment plan. They have identified and are working toward their discharge goals.  yes    Visit start and stop times:    07/21/23  Start Time: 1400  Stop Time: 1450  Total Visit Time: 50 minutes

## 2023-07-22 ENCOUNTER — OFFICE VISIT (OUTPATIENT)
Dept: URGENT CARE | Age: 28
End: 2023-07-22
Payer: COMMERCIAL

## 2023-07-22 VITALS
SYSTOLIC BLOOD PRESSURE: 116 MMHG | OXYGEN SATURATION: 100 % | TEMPERATURE: 98 F | DIASTOLIC BLOOD PRESSURE: 72 MMHG | HEART RATE: 80 BPM | RESPIRATION RATE: 18 BRPM

## 2023-07-22 DIAGNOSIS — J06.9 UPPER RESPIRATORY TRACT INFECTION, UNSPECIFIED TYPE: Primary | ICD-10-CM

## 2023-07-22 PROCEDURE — 99213 OFFICE O/P EST LOW 20 MIN: CPT | Performed by: NURSE PRACTITIONER

## 2023-07-22 RX ORDER — AZITHROMYCIN 250 MG/1
TABLET, FILM COATED ORAL
Qty: 6 TABLET | Refills: 0 | Status: SHIPPED | OUTPATIENT
Start: 2023-07-22 | End: 2023-07-26

## 2023-07-22 RX ORDER — BENZONATATE 200 MG/1
200 CAPSULE ORAL 3 TIMES DAILY PRN
Qty: 20 CAPSULE | Refills: 0 | Status: SHIPPED | OUTPATIENT
Start: 2023-07-22

## 2023-07-22 NOTE — PROGRESS NOTES
[facility]        NAME: Diana Espinosa is a 34 y.o. female  : 1995    MRN: [id number]  DATE: 2023  TIME: 6:02 PM    Assessment and Plan   Upper respiratory tract infection, unspecified type [J06.9]  1. Upper respiratory tract infection, unspecified type  benzonatate (TESSALON) 200 MG capsule    azithromycin (ZITHROMAX) 250 mg tablet            Patient Instructions     Take med as prescribed  Mucinex OTC   Follow up with PCP in 3-5 days. Proceed to  ER if symptoms worsen. Chief Complaint     Chief Complaint   Patient presents with   • Eye Pain   • Cough     Patient states starting Wednesday ,she is having a productive cough, nasal congestion, eye pain, headaches, and joint pain. She also feels fatigued and lethargic. She wanted to mention she came in contact of a dead animals bodily fluid, and was "eaten alive by mosquitos"         History of Present Illness       HPI   Reports cough, eye pain, chills, feeling feverish, headaches and burning sensation in the eyes. She buried their dog who  of cancer. Came in contact with some of the body fluid. Was wearing gloves. No known sick contacts. Review of Systems   Review of Systems   Constitutional: Positive for chills and fatigue. HENT: Positive for congestion and rhinorrhea. Eyes: Positive for pain. Negative for photophobia, discharge, redness and visual disturbance. Respiratory: Positive for cough. Gastrointestinal: Negative for abdominal pain, nausea and vomiting. Neurological: Positive for headaches.          Current Medications       Current Outpatient Medications:   •  azelastine (ASTELIN) 0.1 % nasal spray, 2 sprays into each nostril 2 (two) times a day as needed for rhinitis Use in each nostril as directed, Disp: 30 mL, Rfl: 5  •  azithromycin (ZITHROMAX) 250 mg tablet, Take 2 tablets today then 1 tablet daily x 4 days, Disp: 6 tablet, Rfl: 0  •  BD Insulin Syringe U/F 31G X " 0.5 ML MISC, Use as directly with weekly methotrexate injection. , Disp: 100 each, Rfl: 0  •  benzonatate (TESSALON) 200 MG capsule, Take 1 capsule (200 mg total) by mouth 3 (three) times a day as needed for cough, Disp: 20 capsule, Rfl: 0  •  clonazePAM (KlonoPIN) 0.5 mg tablet, Take 1 tablet (0.5 mg total) by mouth daily, Disp: 30 tablet, Rfl: 2  •  folic acid (FOLVITE) 1 mg tablet, Take 1 tablet (1 mg total) by mouth daily, Disp: 90 tablet, Rfl: 3  •  gabapentin (Neurontin) 300 mg capsule, Take 1 capsule (300 mg total) by mouth daily at bedtime PRN, Disp: 90 capsule, Rfl: 3  •  glucagon 1 MG injection, 1 mg, Disp: , Rfl:   •  Glucagon HCl (Glucagon Emergency) 1 MG/ML SOLR, INJECT 1 MG AS DIRECTED AS NEEDED (WHEN PASSED OUT FROM LOW BLOOD SUGAR). , Disp: , Rfl:   •  hydroxychloroquine (PLAQUENIL) 200 mg tablet, Take 1 tablet (200 mg total) by mouth 2 (two) times a day with meals, Disp: 180 tablet, Rfl: 3  •  levonorgestrel-ethinyl estradiol (Altavera) 0.15-30 MG-MCG per tablet, Take 1 tablet by mouth daily, Disp: 84 tablet, Rfl: 3  •  levothyroxine 25 mcg tablet, Take 1 tablet (25 mcg total) by mouth daily, Disp: 60 tablet, Rfl: 0  •  metFORMIN (GLUCOPHAGE-XR) 500 mg 24 hr tablet, 1,000 mg 2 (two) times a day with meals  , Disp: , Rfl:   •  methotrexate 50 MG/2ML injection, Inject 0.8 mL (20 mg total) under the skin once a week, Disp: 4 mL, Rfl: 5  •  NovoLOG 100 UNIT/ML injection, INJECT 3 TIMES A DAY WITH MEALS BASED ON ICR 4 AND ISF 30 FOR TDD 90 UNITS DAILY VIA INSULIN PUMP, Disp: , Rfl:   •  OneTouch Verio test strip, USE 4 TIMES A DAY BEFORE MEALS AND AT BEDTIME, Disp: , Rfl:   •  pancrelipase, Lip-Prot-Amyl, (CREON) 12,000 units capsule, Take 36,000 units of lipase by mouth 3 (three) times a day with meals Take 3 capsules prior to each meal and 1 prior to snack, Disp: 120 capsule, Rfl: 3  •  promethazine (PHENERGAN) 12.5 MG tablet, Take 1 tablet (12.5 mg total) by mouth every 6 (six) hours as needed for nausea or vomiting, Disp: 30 tablet, Rfl: 0  •  Tresiba FlexTouch 100 units/mL injection pen, INJECT 34 UNITS DAILY IN THE EVENT OF PUMP FAILURE E10.65, Disp: , Rfl:   •  Insulin Pen Needle (BD PEN NEEDLE NASIM U/F) 32G X 4 MM MISC, by Does not apply route 4 (four) times a day for 180 days, Disp: 400 each, Rfl: 3    Current Allergies     Allergies as of 07/22/2023 - Reviewed 07/22/2023   Allergen Reaction Noted   • Insulin glargine Other (See Comments) 07/02/2019            The following portions of the patient's history were reviewed and updated as appropriate: allergies, current medications, past family history, past medical history, past social history, past surgical history and problem list.     Past Medical History:   Diagnosis Date   • Abdominal pain    • Anaphylaxis    • Anxiety    • Anxiety and depression    • COVID-19 12/2020   • Delayed emergence from anesthesia 03/23/2023    Severe episode of emergence delirium (confused, combative) after MAC anesthetic on 03/2023 for EGD. Received versed 2mg, >50mcg precedex, 5mg IV haldol, and 50mcg fentanyl. Waxing and waning episodes of agitation. Any future anesthetics should NOT be done at [facility].    • Delirium, induced by drug     anesthesia   • Depression    • Diabetes mellitus ([address])    • Disease of thyroid gland     Hashimoto   • DKA, type 1 ([address]) 05/11/2021   • Eating disorder     history of anorexia/bulemia [id number]   • Food intolerance    • Fracture of fibula     R Salter I   • Wyline Fred disease    • Hashimoto's disease 02/21/2020   • Head injury    • Headache(784.0)    • Nasal congestion    • PTSD (post-traumatic stress disorder)    • Rectal bleed    • Seizures (HCC)    • Type 1 diabetes ([address])        Past Surgical History:   Procedure Laterality Date   • COLONOSCOPY     • KNEE SURGERY Right 07/06/2020   • NASAL SEPTOPLASTY W/ TURBINOPLASTY     • SINUS SURGERY     • SKIN BIOPSY     • TURBINOPLASTY N/A 3/22/2021    Procedure: Yahaira Nick;  Surgeon: Chapis Vidales MD;  Location: [facility]; Service: ENT   • UPPER GASTROINTESTINAL ENDOSCOPY     • WISDOM TOOTH EXTRACTION     • WRIST SURGERY      left; Excision of ganglion       Family History   Problem Relation Age of Onset   • Hypertension Mother    • Migraines Mother         Headache   • Diabetes type II Mother    • Varicose Veins Mother    • Hyperlipidemia Mother    • Diabetes Mother    • Arthritis Mother    • Depression Mother    • Hearing loss Mother    • Anxiety disorder Mother    • Eczema Father    • Cholelithiasis Father    • Hypertension Father    • Sarcoidosis Father         Liver   • Hyperlipidemia Father    • Diabetes Father    • Coronary artery disease Father    • Nephrolithiasis Father    • Cirrhosis Father    • Alcohol abuse Father    • Thyroid disease Sister    • Hashimoto's thyroiditis Sister    • Alcohol abuse Brother    • Cancer Family    • Diabetes Family    • Hypertension Family          Medications have been verified. Objective   /72   Pulse 80   Temp 98 °F (36.7 °C)   Resp 18   SpO2 100%   No LMP recorded. (Menstrual status: Amenorrheic other). Physical Exam     Physical Exam  Constitutional:       Appearance: She is ill-appearing. She is not diaphoretic. HENT:      Right Ear: Tympanic membrane normal.      Left Ear: Tympanic membrane normal.      Nose: No rhinorrhea. Mouth/Throat:      Pharynx: No posterior oropharyngeal erythema. Cardiovascular:      Rate and Rhythm: Regular rhythm. Heart sounds: Normal heart sounds. Pulmonary:      Effort: Pulmonary effort is normal.      Breath sounds: Normal breath sounds. No wheezing. Abdominal:      Tenderness: There is no abdominal tenderness. There is no guarding. Lymphadenopathy:      Cervical: Cervical adenopathy present.

## 2023-07-24 ENCOUNTER — TELEPHONE (OUTPATIENT)
Dept: PSYCHIATRY | Facility: CLINIC | Age: 28
End: 2023-07-24

## 2023-07-24 NOTE — TELEPHONE ENCOUNTER
Writer contacted patient to inform them their 7/28 appointment at 2 pm is still scheduled and patient was grateful for the call.

## 2023-07-25 ENCOUNTER — TELEPHONE (OUTPATIENT)
Dept: FAMILY MEDICINE CLINIC | Facility: CLINIC | Age: 28
End: 2023-07-25

## 2023-07-25 LAB
C DIPHTHERIAE AB SER IA-ACNC: 0.17 IU/ML
C TETANI IGG SER IA-ACNC: 0.78 IU/ML
LEFT EYE DIABETIC RETINOPATHY: NORMAL
RIGHT EYE DIABETIC RETINOPATHY: NORMAL

## 2023-07-25 NOTE — TELEPHONE ENCOUNTER
Left message to find out who does her short term disability forms. [employer] [place] is not able to fill them out due to insurance looking for more info.

## 2023-07-26 LAB
DEPRECATED S PNEUM14 IGG SER-MCNC: 1.1 UG/ML
DEPRECATED S PNEUM19 IGG SER-MCNC: 1.5 UG/ML
DEPRECATED S PNEUM23 IGG SER-MCNC: 3.1 UG/ML
DEPRECATED S PNEUM3 IGG SER-MCNC: 0.9 UG/ML
DEPRECATED S PNEUM4 IGG SER-MCNC: <0.1 UG/ML
DEPRECATED S PNEUM8 AB SER-MCNC: 16.1 UG/ML
DEPRECATED S PNEUM9 IGG SER-MCNC: 12.6 UG/ML
FLUBV RNA SPEC QL NAA+PROBE: 1 UG/ML
S PNEUM DA 18C IGG SER-MCNC: 0.1 UG/ML
S PNEUM DA 19A IGG SER-MCNC: 1.1 UG/ML
S PNEUM DA 6B IGG SER-MCNC: <0.1 UG/ML
SL AMB PNEUMO AB TYPE 17 (17F): 1.2 UG/ML
SL AMB PNEUMO AB TYPE 20: 3.5 UG/ML
SL AMB PNEUMO AB TYPE 22 (22F): 1.4 UG/ML
SL AMB PNEUMO AB TYPE 2: 5.7 UG/ML
SL AMB PNEUMO AB TYPE 34 (10A): 0.2 UG/ML
SL AMB PNEUMO AB TYPE 43 (11A): 0.3 UG/ML
SL AMB PNEUMO AB TYPE 54 (15B): 1.2 UG/ML
SL AMB PNEUMO AB TYPE 5: 0.3 UG/ML
SL AMB PNEUMO AB TYPE 70 (33F): 0.7 UG/ML
STREP PNEUMO TYPE 1: 2.8 UG/ML
STREP PNEUMO TYPE 51: 0.1 UG/ML
STREP PNEUMO TYPE 68: 2.1 UG/ML

## 2023-07-28 ENCOUNTER — SOCIAL WORK (OUTPATIENT)
Dept: BEHAVIORAL/MENTAL HEALTH CLINIC | Facility: CLINIC | Age: 28
End: 2023-07-28

## 2023-07-28 ENCOUNTER — TELEPHONE (OUTPATIENT)
Dept: PSYCHIATRY | Facility: CLINIC | Age: 28
End: 2023-07-28

## 2023-07-28 DIAGNOSIS — F31.62 BIPOLAR DISORDER, CURRENT EPISODE MIXED, MODERATE (HCC): ICD-10-CM

## 2023-07-28 DIAGNOSIS — F43.12 CHRONIC POST-TRAUMATIC STRESS DISORDER (PTSD): ICD-10-CM

## 2023-07-28 DIAGNOSIS — F41.0 GENERALIZED ANXIETY DISORDER WITH PANIC ATTACKS: Primary | ICD-10-CM

## 2023-07-28 DIAGNOSIS — F41.1 GENERALIZED ANXIETY DISORDER WITH PANIC ATTACKS: Primary | ICD-10-CM

## 2023-07-28 NOTE — PSYCH
Behavioral Health Psychotherapy Progress Note    Psychotherapy Provided: Individual Psychotherapy     1. Generalized anxiety disorder with panic attacks        2. Chronic post-traumatic stress disorder (PTSD)        3. Bipolar disorder, current episode mixed, moderate ([address])            Goals addressed in session: Goal 1 and Goal 2     DATA:     Met with Jenny individually. Session focused upon ongoing obstacles related to sister - feeling a loss - Fidelia Novoa is also asking if she is part of the problem - specific scenarios as to how she camron that conclusion discussed. Reviewed cognitive distortions. Fidelia Novoa asking for couples therapy - will place referral.  Denied SI    During this session, this clinician used the following therapeutic modalities: Cognitive Behavioral Therapy and Supportive Psychotherapy    Substance Abuse was not addressed during this session. If the client is diagnosed with a co-occurring substance use disorder, please indicate any changes in the frequency or amount of use: . Stage of change for addressing substance use diagnoses: No substance use/Not applicable    ASSESSMENT:  Ada Ortiz presents with a Dysthymic mood. her affect is Flat, which is congruent, with her mood and the content of the session. The client has made progress on their goals. Ada Ortiz presents with a low risk of suicide, low risk of self-harm, and low risk of harm to others. For any risk assessment that surpasses a "low" rating, a safety plan must be developed. A safety plan was indicated: no  If yes, describe in detail     PLAN: Between sessions, Ada Ortiz will challenge cognitive distortions. At the next session, the therapist will use Cognitive Behavioral Therapy and Supportive Psychotherapy to address  Engagement, family, STD obstacles. Behavioral Health Treatment Plan and Discharge Planning: Ada Ortiz is aware of and agrees to continue to work on their treatment plan. They have identified and are working toward their discharge goals.  yes    Visit start and stop times:    07/28/23  Start Time: 1400  Stop Time: 1450  Total Visit Time: 50 minutes

## 2023-07-28 NOTE — TELEPHONE ENCOUNTER
Marquita Conley left a message for CM on Thursday 7/27 at 3:45pm. Marquita Conley stated that the representative for her STD is requesting records from [facility] [place]. Writer returned call. Marquita Conley answered and was in session with [facility] THE [facility] [place]. Marquita Conley will be speak to the  about the record request.     Marquita Conley had also mentioned paperwork that needed to be filled out since the paperwork was submitted after 7/3. CM stated that they did not receive any notification about that.

## 2023-07-28 NOTE — RESULT ENCOUNTER NOTE
She makes 9/23 protective titers to pneumococcus. She has protective titers to diphtheria and tetanus.

## 2023-08-04 ENCOUNTER — TELEPHONE (OUTPATIENT)
Dept: PSYCHIATRY | Facility: CLINIC | Age: 28
End: 2023-08-04

## 2023-08-04 NOTE — TELEPHONE ENCOUNTER
Writer rec a  return call from patient regarding message left for her. Messaged intake to return call. Patient would like to be called after 12:30 so she does not miss call.   Thank you

## 2023-08-11 ENCOUNTER — TELEPHONE (OUTPATIENT)
Dept: PSYCHIATRY | Facility: CLINIC | Age: 28
End: 2023-08-11

## 2023-08-11 ENCOUNTER — TELEPHONE (OUTPATIENT)
Dept: ADMINISTRATIVE | Facility: OTHER | Age: 28
End: 2023-08-11

## 2023-08-11 ENCOUNTER — SOCIAL WORK (OUTPATIENT)
Dept: BEHAVIORAL/MENTAL HEALTH CLINIC | Facility: CLINIC | Age: 28
End: 2023-08-11

## 2023-08-11 DIAGNOSIS — F31.62 BIPOLAR DISORDER, CURRENT EPISODE MIXED, MODERATE (HCC): Primary | ICD-10-CM

## 2023-08-11 DIAGNOSIS — F41.1 GENERALIZED ANXIETY DISORDER WITH PANIC ATTACKS: ICD-10-CM

## 2023-08-11 DIAGNOSIS — F43.12 CHRONIC POST-TRAUMATIC STRESS DISORDER (PTSD): ICD-10-CM

## 2023-08-11 DIAGNOSIS — F41.0 GENERALIZED ANXIETY DISORDER WITH PANIC ATTACKS: ICD-10-CM

## 2023-08-11 DIAGNOSIS — F42.2 MIXED OBSESSIONAL THOUGHTS AND ACTS: ICD-10-CM

## 2023-08-11 NOTE — PSYCH
Behavioral Health Psychotherapy Progress Note    Psychotherapy Provided: Individual Psychotherapy     1. Bipolar disorder, current episode mixed, moderate ([address])        2. Chronic post-traumatic stress disorder (PTSD)        3. Generalized anxiety disorder with panic attacks        4. Mixed obsessional thoughts and acts            Goals addressed in session: Goal 1 and Goal 2     DATA:       Met with Shabana Salvador individually. 'I feel I'm in a depressive state right now'. Explored book 'walking on eggshells'  Therapist recommended for Shabana Salvador to learn about sister's presentation. Shabana Salvador highlighted several items and wrote comments on how she sees this in [place]. Discussed emotional stress in having Uriel in her life - Uriel has been offensive, rude, has Shabana Salvador feel like she is 'never good enough'. What would lit look like to not have a relationship any longer. Shabana Salvador feels she is in a deep depression - has a lot of trouble going to sleep but once this occurs she will sleep for 12 hrs. Radha Rubio returning 8/30. Shabana Salvador and Radha Rubio will begin couples therapy at [address] [place] [address]. Denied SI      During this session, this clinician used the following therapeutic modalities: Cognitive Processing Therapy and Supportive Psychotherapy    Substance Abuse was not addressed during this session. If the client is diagnosed with a co-occurring substance use disorder, please indicate any changes in the frequency or amount of use: . Stage of change for addressing substance use diagnoses: No substance use/Not applicable    ASSESSMENT:  Jass Lunsford presents with a Depressed mood. her affect is Flat, which is congruent, with her mood and the content of the session. The client has made progress on their goals. Jass Lunsford presents with a low risk of suicide, low risk of self-harm, and low risk of harm to others. For any risk assessment that surpasses a "low" rating, a safety plan must be developed.     A safety plan was indicated: no  If yes, describe in detail     PLAN: Between sessions, Cynthia Henry will set boundaries . At the next session, the therapist will use Cognitive Behavioral Therapy, Cognitive Processing Therapy and Supportive Psychotherapy to address relationship, depression, family issues    [address] and Discharge Planning: Cynthia Henry is aware of and agrees to continue to work on their treatment plan. They have identified and are working toward their discharge goals.  yes    Visit start and stop times:    08/11/23  Start Time: 1400  Stop Time: 1450  Total Visit Time: 50 minutes

## 2023-08-11 NOTE — TELEPHONE ENCOUNTER
Medical record request was received from [employer] for the time frame of 7/3/2023 to Present. Will be placed in Dr. Mary Miramontes by the end of the day.

## 2023-08-11 NOTE — TELEPHONE ENCOUNTER
----- Message from Kitty Barrett LPN sent at 5/80/8626  7:28 AM EDT -----  Regarding: care gap request  08/11/23 7:28 AM    Hello, our patient attached above has had Diabetic Eye Exam completed/performed. Please assist in updating the patient chart by making an External outreach to [facility] facility located in [place], [place]. The date of service is 2023.     Thank you,  Kitty Reynoldsu FP

## 2023-08-11 NOTE — LETTER
Diabetic Eye Exam Form    Date Requested: 23  Patient: Alex Jimenez  Patient : 1995   Referring Provider: MARGARET Gupta      DIABETIC Eye Exam Date _______________________________      Type of Exam MUST be documented for Diabetic Eye Exams. Please CHECK ONE. Retinal Exam       Dilated Retinal Exam       OCT       Optomap-Iris Exam      Fundus Photography       Left Eye - Please check Retinopathy or No Retinopathy        Exam did show retinopathy    Exam did not show retinopathy       Right Eye - Please check Retinopathy or No Retinopathy       Exam did show retinopathy    Exam did not show retinopathy       Comments __________________________________________________________    Practice Providing Exam ______________________________________________    Exam Performed By (print name) _______________________________________      Provider Signature ___________________________________________________      These reports are needed for  compliance. Please fax this completed form and a copy of the Diabetic Eye Exam report to our office located at [address] [place] [address] as soon as possible via Fax [phone] attention Franklin Chris: Phone [phone]  We thank you for your assistance in treating our mutual patient.

## 2023-08-15 ENCOUNTER — TELEPHONE (OUTPATIENT)
Dept: PSYCHIATRY | Facility: CLINIC | Age: 28
End: 2023-08-15

## 2023-08-15 NOTE — TELEPHONE ENCOUNTER
Writer left a message for patient to reschedule her appointment on 8/22 due to patient being out of state and not back till 8/31. Office number was left and patient was asked to call back.

## 2023-08-16 NOTE — TELEPHONE ENCOUNTER
Upon review of the In Basket request we were able to locate, review, and update the patient chart as requested for Diabetic Eye Exam.    Any additional questions or concerns should be emailed to the Practice Liaisons via the appropriate education email address, please do not reply via In Basket.     Thank you  Cindy Rees MA

## 2023-08-29 ENCOUNTER — TELEPHONE (OUTPATIENT)
Dept: PSYCHIATRY | Facility: CLINIC | Age: 28
End: 2023-08-29

## 2023-08-29 NOTE — TELEPHONE ENCOUNTER
Patient contacted the office to schedule a follow up visit with provider. Patient is now scheduled for 9/20  at 11:30am virtually.

## 2023-08-31 ENCOUNTER — TELEPHONE (OUTPATIENT)
Age: 28
End: 2023-08-31

## 2023-08-31 ENCOUNTER — SOCIAL WORK (OUTPATIENT)
Dept: BEHAVIORAL/MENTAL HEALTH CLINIC | Facility: CLINIC | Age: 28
End: 2023-08-31

## 2023-08-31 DIAGNOSIS — M35.9 UNDIFFERENTIATED CONNECTIVE TISSUE DISEASE (HCC): Primary | ICD-10-CM

## 2023-08-31 DIAGNOSIS — Z91.199 NO-SHOW FOR APPOINTMENT: Primary | ICD-10-CM

## 2023-08-31 DIAGNOSIS — F41.0 GENERALIZED ANXIETY DISORDER WITH PANIC ATTACKS: ICD-10-CM

## 2023-08-31 DIAGNOSIS — F43.12 CHRONIC POST-TRAUMATIC STRESS DISORDER (PTSD): ICD-10-CM

## 2023-08-31 DIAGNOSIS — F41.1 GENERALIZED ANXIETY DISORDER WITH PANIC ATTACKS: ICD-10-CM

## 2023-08-31 RX ORDER — PREDNISONE 20 MG/1
20 TABLET ORAL DAILY
Qty: 7 TABLET | Refills: 0 | Status: SHIPPED | OUTPATIENT
Start: 2023-08-31

## 2023-08-31 NOTE — TELEPHONE ENCOUNTER
I will prescribe her a 1 week course of prednisone until she sees me in the office soon. Please let her know she will need to check her blood sugars closely while on the steroids.

## 2023-08-31 NOTE — TELEPHONE ENCOUNTER
Spoke with patient and she will do the prednisone and watch her sugars as they are already elevated due to her flare.  thanks

## 2023-09-01 ENCOUNTER — SOCIAL WORK (OUTPATIENT)
Dept: BEHAVIORAL/MENTAL HEALTH CLINIC | Facility: CLINIC | Age: 28
End: 2023-09-01
Payer: COMMERCIAL

## 2023-09-01 ENCOUNTER — TELEPHONE (OUTPATIENT)
Dept: PSYCHIATRY | Facility: CLINIC | Age: 28
End: 2023-09-01

## 2023-09-01 DIAGNOSIS — F42.2 MIXED OBSESSIONAL THOUGHTS AND ACTS: Primary | ICD-10-CM

## 2023-09-01 DIAGNOSIS — F41.0 GENERALIZED ANXIETY DISORDER WITH PANIC ATTACKS: ICD-10-CM

## 2023-09-01 DIAGNOSIS — F43.12 CHRONIC POST-TRAUMATIC STRESS DISORDER (PTSD): ICD-10-CM

## 2023-09-01 DIAGNOSIS — F41.1 GENERALIZED ANXIETY DISORDER WITH PANIC ATTACKS: ICD-10-CM

## 2023-09-01 DIAGNOSIS — F31.62 BIPOLAR DISORDER, CURRENT EPISODE MIXED, MODERATE (HCC): ICD-10-CM

## 2023-09-01 PROCEDURE — 90837 PSYTX W PT 60 MINUTES: CPT | Performed by: SOCIAL WORKER

## 2023-09-01 NOTE — PSYCH
No Call. No Show. Charge    Davidestefanía Shandra no showed 09/01/23 appointment , staff will mail a no show letter. Treatment Plan not due at this session.

## 2023-09-01 NOTE — PSYCH
Behavioral Health Psychotherapy Progress Note    Psychotherapy Provided: Individual Psychotherapy     1. Mixed obsessional thoughts and acts        2. Generalized anxiety disorder with panic attacks        3. Chronic post-traumatic stress disorder (PTSD)        4. Bipolar disorder, current episode mixed, moderate ([address])            Goals addressed in session: Goal 2 and Goal 3      DATA:     Met with Jenny individually. Many obstacles - got period for first time since 2016 - triggered a lupus flair up - sugars very unstable. Experienced a severe anxiety attack after reading some emails from work as to return to work or not - colleague quit due to stress. Benny Sanches plans to do the same - multiple writeups for work [employer] responsible for due to being out - looking for a new job through . Argument with Cleveland Guzman - upset that he went out with friend (no problem) - came to pick Benny Sanches up - went to [address] and friend - crowds increased - sensory overload - panic. Cleveland Guzman didn't want to leave. Several days without sleep - 'my brain wouldn't stop and I didn't have my clonazepam.  Missed couples session - needs to reschedule. Setting wonderful boundaries with sister and long talk with parents - dad understands better - sister hasn't called over 2 weeks. Dad in hospital - looking into heart surgery. Denied SI      During this session, this clinician used the following therapeutic modalities: Cognitive Processing Therapy and Supportive Psychotherapy    Substance Abuse was not addressed during this session. If the client is diagnosed with a co-occurring substance use disorder, please indicate any changes in the frequency or amount of use: . Stage of change for addressing substance use diagnoses: No substance use/Not applicable    ASSESSMENT:  Percy Garcia presents with a Anxious and Dysthymic mood.      her affect is Constricted and Tearful, which is congruent, with her mood and the content of the session. The client has made progress on their goals. Yanet Devi presents with a low risk of suicide, low risk of self-harm, and low risk of harm to others. For any risk assessment that surpasses a "low" rating, a safety plan must be developed. A safety plan was indicated: yes  If yes, describe in detail     PLAN: Between sessions, Yanet Devi will continue to follow up on Dr. Letty Koroma, return to sleep schedule, . At the next session, the therapist will use Cognitive Processing Therapy and Supportive Psychotherapy to address     [address] and Discharge Planning: Yanet Devi is aware of and agrees to continue to work on their treatment plan. They have identified and are working toward their discharge goals.  yes    Visit start and stop times:    09/01/23  Start Time: 1355  Stop Time: 1450  Total Visit Time: 55 minutes

## 2023-09-05 ENCOUNTER — ANNUAL EXAM (OUTPATIENT)
Age: 28
End: 2023-09-05
Payer: COMMERCIAL

## 2023-09-05 ENCOUNTER — TELEPHONE (OUTPATIENT)
Dept: PSYCHIATRY | Facility: CLINIC | Age: 28
End: 2023-09-05

## 2023-09-05 ENCOUNTER — APPOINTMENT (OUTPATIENT)
Dept: LAB | Age: 28
End: 2023-09-05
Payer: COMMERCIAL

## 2023-09-05 ENCOUNTER — HOSPITAL ENCOUNTER (EMERGENCY)
Facility: HOSPITAL | Age: 28
Discharge: HOME | End: 2023-09-06
Attending: EMERGENCY MEDICINE
Payer: COMMERCIAL

## 2023-09-05 ENCOUNTER — HOSPITAL ENCOUNTER (OUTPATIENT)
Dept: RADIOLOGY | Facility: HOSPITAL | Age: 28
Discharge: HOME | End: 2023-09-05
Attending: OBSTETRICS & GYNECOLOGY
Payer: COMMERCIAL

## 2023-09-05 VITALS
WEIGHT: 135.2 LBS | DIASTOLIC BLOOD PRESSURE: 48 MMHG | HEIGHT: 63 IN | BODY MASS INDEX: 23.96 KG/M2 | SYSTOLIC BLOOD PRESSURE: 102 MMHG

## 2023-09-05 DIAGNOSIS — N92.1 MENORRHAGIA WITH IRREGULAR CYCLE: ICD-10-CM

## 2023-09-05 DIAGNOSIS — R10.9 ABDOMINAL PAIN: ICD-10-CM

## 2023-09-05 DIAGNOSIS — R10.31 RLQ ABDOMINAL PAIN: ICD-10-CM

## 2023-09-05 DIAGNOSIS — E06.3 HASHIMOTO'S DISEASE: ICD-10-CM

## 2023-09-05 DIAGNOSIS — Z01.419 ENCOUNTER FOR ANNUAL ROUTINE GYNECOLOGICAL EXAMINATION: Primary | ICD-10-CM

## 2023-09-05 DIAGNOSIS — Z30.41 ENCOUNTER FOR SURVEILLANCE OF CONTRACEPTIVE PILLS: ICD-10-CM

## 2023-09-05 DIAGNOSIS — M35.9 UNDIFFERENTIATED CONNECTIVE TISSUE DISEASE (HCC): ICD-10-CM

## 2023-09-05 DIAGNOSIS — R42 LIGHTHEADEDNESS: Primary | ICD-10-CM

## 2023-09-05 DIAGNOSIS — K04.7 DENTAL ABSCESS: ICD-10-CM

## 2023-09-05 DIAGNOSIS — E10.649 UNCONTROLLED TYPE 1 DIABETES MELLITUS WITH HYPOGLYCEMIA WITHOUT COMA (HCC): ICD-10-CM

## 2023-09-05 DIAGNOSIS — J31.0 RHINITIS, UNSPECIFIED TYPE: ICD-10-CM

## 2023-09-05 DIAGNOSIS — R76.8 RHEUMATOID FACTOR POSITIVE: ICD-10-CM

## 2023-09-05 DIAGNOSIS — E10.69 TYPE 1 DIABETES MELLITUS WITH OTHER SPECIFIED COMPLICATION (HCC): ICD-10-CM

## 2023-09-05 DIAGNOSIS — M32.8 OTHER FORMS OF SYSTEMIC LUPUS ERYTHEMATOSUS, UNSPECIFIED ORGAN INVOLVEMENT STATUS (HCC): ICD-10-CM

## 2023-09-05 DIAGNOSIS — R11.0 NAUSEA: ICD-10-CM

## 2023-09-05 DIAGNOSIS — J06.9 UPPER RESPIRATORY TRACT INFECTION, UNSPECIFIED TYPE: ICD-10-CM

## 2023-09-05 DIAGNOSIS — R19.7 DIARRHEA, UNSPECIFIED TYPE: ICD-10-CM

## 2023-09-05 PROBLEM — N91.2 AMENORRHEA: Status: RESOLVED | Noted: 2020-02-21 | Resolved: 2023-09-05

## 2023-09-05 PROBLEM — N76.0 VULVOVAGINITIS: Status: RESOLVED | Noted: 2020-11-03 | Resolved: 2023-09-05

## 2023-09-05 PROBLEM — Z00.00 ANNUAL PHYSICAL EXAM: Status: RESOLVED | Noted: 2023-06-20 | Resolved: 2023-09-05

## 2023-09-05 PROBLEM — N76.2 VULVAR CELLULITIS: Status: RESOLVED | Noted: 2020-11-04 | Resolved: 2023-09-05

## 2023-09-05 LAB
ALBUMIN SERPL BCP-MCNC: 4.2 G/DL (ref 3.5–5)
ALP SERPL-CCNC: 53 U/L (ref 34–104)
ALT SERPL W P-5'-P-CCNC: 11 U/L (ref 7–52)
ANION GAP SERPL CALCULATED.3IONS-SCNC: 11 MMOL/L
AST SERPL W P-5'-P-CCNC: 14 U/L (ref 13–39)
BACTERIA UR QL AUTO: ABNORMAL /HPF
BASOPHILS # BLD AUTO: 0.03 THOUSANDS/ÂΜL (ref 0–0.1)
BASOPHILS NFR BLD AUTO: 0 % (ref 0–1)
BILIRUB SERPL-MCNC: 1.12 MG/DL (ref 0.2–1)
BILIRUB UR QL STRIP: NEGATIVE
BUN SERPL-MCNC: 12 MG/DL (ref 5–25)
C3 SERPL-MCNC: 124 MG/DL (ref 87–200)
C4 SERPL-MCNC: 16 MG/DL (ref 19–52)
CALCIUM SERPL-MCNC: 9.6 MG/DL (ref 8.4–10.2)
CAOX CRY URNS QL MICRO: ABNORMAL /HPF
CHLORIDE SERPL-SCNC: 103 MMOL/L (ref 96–108)
CLARITY UR: ABNORMAL
CO2 SERPL-SCNC: 26 MMOL/L (ref 21–32)
COLOR UR: YELLOW
CREAT SERPL-MCNC: 0.81 MG/DL (ref 0.6–1.3)
CREAT UR-MCNC: 239.6 MG/DL
CRP SERPL QL: <1 MG/L
EOSINOPHIL # BLD AUTO: 0.03 THOUSAND/ÂΜL (ref 0–0.61)
EOSINOPHIL NFR BLD AUTO: 0 % (ref 0–6)
ERYTHROCYTE [DISTWIDTH] IN BLOOD BY AUTOMATED COUNT: 13.3 % (ref 11.6–15.1)
ERYTHROCYTE [SEDIMENTATION RATE] IN BLOOD: 6 MM/HOUR (ref 0–19)
EXT PREGNANCY TEST URINE: NEGATIVE
EXT. CONTROL: NORMAL
GFR SERPL CREATININE-BSD FRML MDRD: 100 ML/MIN/1.73SQ M
GLUCOSE SERPL-MCNC: 120 MG/DL (ref 65–140)
GLUCOSE SERPL-MCNC: 179 MG/DL (ref 65–140)
GLUCOSE UR STRIP-MCNC: NEGATIVE MG/DL
HCT VFR BLD AUTO: 41.2 % (ref 34.8–46.1)
HGB BLD-MCNC: 12.6 G/DL (ref 11.5–15.4)
HGB UR QL STRIP.AUTO: NEGATIVE
IMM GRANULOCYTES # BLD AUTO: 0.02 THOUSAND/UL (ref 0–0.2)
IMM GRANULOCYTES NFR BLD AUTO: 0 % (ref 0–2)
KETONES UR STRIP-MCNC: NEGATIVE MG/DL
LEUKOCYTE ESTERASE UR QL STRIP: ABNORMAL
LYMPHOCYTES # BLD AUTO: 4.12 THOUSANDS/ÂΜL (ref 0.6–4.47)
LYMPHOCYTES NFR BLD AUTO: 45 % (ref 14–44)
MCH RBC QN AUTO: 27.3 PG (ref 26.8–34.3)
MCHC RBC AUTO-ENTMCNC: 30.6 G/DL (ref 31.4–37.4)
MCV RBC AUTO: 89 FL (ref 82–98)
MONOCYTES # BLD AUTO: 0.61 THOUSAND/ÂΜL (ref 0.17–1.22)
MONOCYTES NFR BLD AUTO: 7 % (ref 4–12)
MUCOUS THREADS UR QL AUTO: ABNORMAL
NEUTROPHILS # BLD AUTO: 4.3 THOUSANDS/ÂΜL (ref 1.85–7.62)
NEUTS SEG NFR BLD AUTO: 48 % (ref 43–75)
NITRITE UR QL STRIP: NEGATIVE
NON-SQ EPI CELLS URNS QL MICRO: ABNORMAL /HPF
NRBC BLD AUTO-RTO: 0 /100 WBCS
PH UR STRIP.AUTO: 5.5 [PH]
PLATELET # BLD AUTO: 222 THOUSANDS/UL (ref 149–390)
PMV BLD AUTO: 13 FL (ref 8.9–12.7)
POTASSIUM SERPL-SCNC: 3.6 MMOL/L (ref 3.5–5.3)
PROT SERPL-MCNC: 6.9 G/DL (ref 6.4–8.4)
PROT UR STRIP-MCNC: ABNORMAL MG/DL
PROT UR-MCNC: 10 MG/DL
PROT/CREAT UR: 0.04 MG/G{CREAT} (ref 0–0.1)
RBC # BLD AUTO: 4.61 MILLION/UL (ref 3.81–5.12)
RBC #/AREA URNS AUTO: ABNORMAL /HPF
SODIUM SERPL-SCNC: 140 MMOL/L (ref 135–147)
SP GR UR STRIP.AUTO: 1.03 (ref 1–1.03)
UROBILINOGEN UR STRIP-ACNC: <2 MG/DL
WBC # BLD AUTO: 9.11 THOUSAND/UL (ref 4.31–10.16)
WBC #/AREA URNS AUTO: ABNORMAL /HPF

## 2023-09-05 PROCEDURE — 81025 URINE PREGNANCY TEST: CPT

## 2023-09-05 PROCEDURE — 76830 TRANSVAGINAL US NON-OB: CPT

## 2023-09-05 PROCEDURE — 96374 THER/PROPH/DIAG INJ IV PUSH: CPT

## 2023-09-05 PROCEDURE — 85025 COMPLETE CBC W/AUTO DIFF WBC: CPT

## 2023-09-05 PROCEDURE — 82948 REAGENT STRIP/BLOOD GLUCOSE: CPT

## 2023-09-05 PROCEDURE — S0612 ANNUAL GYNECOLOGICAL EXAMINA: HCPCS | Performed by: OBSTETRICS & GYNECOLOGY

## 2023-09-05 PROCEDURE — 85652 RBC SED RATE AUTOMATED: CPT

## 2023-09-05 PROCEDURE — 86160 COMPLEMENT ANTIGEN: CPT

## 2023-09-05 PROCEDURE — 93005 ELECTROCARDIOGRAM TRACING: CPT

## 2023-09-05 PROCEDURE — 36415 COLL VENOUS BLD VENIPUNCTURE: CPT

## 2023-09-05 PROCEDURE — 96375 TX/PRO/DX INJ NEW DRUG ADDON: CPT

## 2023-09-05 PROCEDURE — 80053 COMPREHEN METABOLIC PANEL: CPT

## 2023-09-05 PROCEDURE — 76856 US EXAM PELVIC COMPLETE: CPT

## 2023-09-05 PROCEDURE — 99284 EMERGENCY DEPT VISIT MOD MDM: CPT

## 2023-09-05 PROCEDURE — 86140 C-REACTIVE PROTEIN: CPT

## 2023-09-05 PROCEDURE — 86317 IMMUNOASSAY INFECTIOUS AGENT: CPT

## 2023-09-05 PROCEDURE — 99285 EMERGENCY DEPT VISIT HI MDM: CPT | Performed by: EMERGENCY MEDICINE

## 2023-09-05 PROCEDURE — 86225 DNA ANTIBODY NATIVE: CPT

## 2023-09-05 RX ORDER — LORAZEPAM 2 MG/ML
0.5 INJECTION INTRAMUSCULAR ONCE
Status: COMPLETED | OUTPATIENT
Start: 2023-09-05 | End: 2023-09-05

## 2023-09-05 RX ORDER — ONDANSETRON 2 MG/ML
4 INJECTION INTRAMUSCULAR; INTRAVENOUS ONCE
Status: COMPLETED | OUTPATIENT
Start: 2023-09-05 | End: 2023-09-05

## 2023-09-05 RX ORDER — LEVONORGESTREL AND ETHINYL ESTRADIOL 0.15-0.03
1 KIT ORAL DAILY
Qty: 84 TABLET | Refills: 3 | Status: SHIPPED | OUTPATIENT
Start: 2023-09-05

## 2023-09-05 RX ORDER — INSULIN PMP CART,AUT,G6/7,CNTR
EACH SUBCUTANEOUS
COMMUNITY
Start: 2023-08-14

## 2023-09-05 RX ADMIN — LORAZEPAM 0.5 MG: 2 INJECTION INTRAMUSCULAR; INTRAVENOUS at 22:11

## 2023-09-05 RX ADMIN — ONDANSETRON 4 MG: 2 INJECTION INTRAMUSCULAR; INTRAVENOUS at 22:11

## 2023-09-06 VITALS
OXYGEN SATURATION: 98 % | SYSTOLIC BLOOD PRESSURE: 98 MMHG | HEART RATE: 80 BPM | DIASTOLIC BLOOD PRESSURE: 65 MMHG | RESPIRATION RATE: 20 BRPM | TEMPERATURE: 98.1 F

## 2023-09-06 LAB
ATRIAL RATE: 78 BPM
DSDNA AB SER-ACNC: <1 IU/ML (ref 0–9)
P AXIS: 73 DEGREES
PR INTERVAL: 132 MS
QRS AXIS: 54 DEGREES
QRSD INTERVAL: 82 MS
QT INTERVAL: 352 MS
QTC INTERVAL: 401 MS
T WAVE AXIS: 49 DEGREES
VENTRICULAR RATE: 78 BPM

## 2023-09-06 PROCEDURE — 93010 ELECTROCARDIOGRAM REPORT: CPT | Performed by: INTERNAL MEDICINE

## 2023-09-06 NOTE — PROGRESS NOTES
Assessment/Plan:    1. Encounter for annual routine gynecological examination      2. RLQ abdominal pain    - US pelvis complete w transvaginal; Future    3. Type 1 diabetes mellitus with other specified complication [facility])    - Ambulatory Referral to Maternal Fetal Medicine; Future    4. Rheumatoid factor positive    - Ambulatory Referral to Maternal Fetal Medicine; Future    5. Menorrhagia with irregular cycle    - CBC and Platelet; Future    6. Encounter for surveillance of contraceptive pills    - levonorgestrel-ethinyl estradiol (Altavera) 0.15-30 MG-MCG per tablet; Take 1 tablet by mouth daily  Dispense: 84 tablet; Refill: 3       Subjective      Sarah Artist is a 34 y.o. female who presents for annual exam. She recently had abnormal heavy VB despite consistent OCP use. She also notes some deep dyspareunia on the right. She and her partner are now legally ; considering conception. Current contraception: OCP (estrogen/progesterone)  History of abnormal Pap smear: no  Regular self breast exam: yes  History of abnormal mammogram: no  History of abnormal lipids: no    Menstrual History:  OB History        1    Para   0    Term   0       0    AB   1    Living           SAB   1    IAB   0    Ectopic   0    Multiple        Live Births               Obstetric Comments   Menarche age 10            Patient's last menstrual period was 08/15/2023 (exact date). Period Pattern: (!) Irregular  Menstrual Flow: Heavy, Moderate  Menstrual Control: Maxi pad  Menstrual Control Change Freq (Hours): 8  Dysmenorrhea: (!) Mild    Past Medical History:   Diagnosis Date   • Abdominal pain    • Anaphylaxis    • Anxiety    • Anxiety and depression    • COVID-19 2020   • Delayed emergence from anesthesia 2023    Severe episode of emergence delirium (confused, combative) after MAC anesthetic on 2023 for EGD. Received versed 2mg, >50mcg precedex, 5mg IV haldol, and 50mcg fentanyl.  Waxing and waning episodes of agitation. Any future anesthetics should NOT be done at [facility]. • Delirium, induced by drug     anesthesia   • Depression    • Diabetes mellitus ([address])    • Disease of thyroid gland     Hashimoto   • DKA, type 1 ([address]) 05/11/2021   • Eating disorder     history of anorexia/bulemia 2503-2352   • Food intolerance    • Fracture of fibula     R Salter I   • Rosezena Croon disease    • Hashimoto's disease 02/21/2020   • Head injury    • Headache(784.0)    • Nasal congestion    • PTSD (post-traumatic stress disorder)    • Rectal bleed    • Seizures ([address])    • Type 1 diabetes ([address])        Family History   Problem Relation Age of Onset   • Hypertension Mother    • Migraines Mother         Headache   • Diabetes type II Mother    • Varicose Veins Mother    • Hyperlipidemia Mother    • Diabetes Mother    • Arthritis Mother    • Depression Mother    • Hearing loss Mother    • Anxiety disorder Mother    • Eczema Father    • Cholelithiasis Father    • Hypertension Father    • Sarcoidosis Father         Liver   • Hyperlipidemia Father    • Diabetes Father    • Coronary artery disease Father    • Nephrolithiasis Father    • Cirrhosis Father    • Alcohol abuse Father    • Thyroid disease Sister    • Hashimoto's thyroiditis Sister    • Alcohol abuse Brother    • Cancer Family    • Diabetes Family    • Hypertension Family        The following portions of the patient's history were reviewed and updated as appropriate: allergies, current medications, past family history, past medical history, past social history, past surgical history and problem list.    Review of Systems  Pertinent items are noted in HPI.       Objective      BP (!) 102/48 (BP Location: Right arm, Patient Position: Sitting, Cuff Size: Standard)   Ht 5' 2.5" (1.588 m)   Wt 61.3 kg (135 lb 3.2 oz)   LMP 08/15/2023 (Exact Date)   BMI 24.33 kg/m²     General:   alert and oriented, in no acute distress   Heart:  Breasts: regular rate and rhythm   appear normal, no suspicious masses, no skin or nipple changes or axillary nodes.    Lungs: effort normal   Abdomen: soft, non-tender, without masses or organomegaly   Vulva: normal   Vagina: normal mucosa   Cervix: no lesions   Uterus: normal size, mobile, non-tender   Adnexa: normal adnexa and no mass, fullness, tenderness

## 2023-09-06 NOTE — DISCHARGE INSTRUCTIONS
You were seen in the emergency department for pain and lightheadedness after an ultrasound. Your blood work from earlier today was reviewed and did not show any concerning findings. Your ultrasound results were normal.  Your symptoms improved after medications to help with anxiety and nausea. Your symptoms were likely a reaction to the pain. Follow-up with your OB/GYN regarding the results of the ultrasound. If you have worsening of your symptoms or any other concerning symptoms please return to the emergency department.

## 2023-09-06 NOTE — ED PROVIDER NOTES
History  Chief Complaint   Patient presents with   • Abdominal Pain     Abd pain while getting abd U/S. Pt became lightheaded, dizzy, nauseous and near syncope. Pt is hyperventilating and diaphoretic. 32-year-old female with history of type 1 diabetes, hypothyroidism, anxiety and depression who presents as a medical emergency after she began experiencing lightheadedness and nausea after undergoing a pelvic ultrasound as an outpatient. Ultrasound was ordered by OB/GYN to evaluate left-sided pelvic/abdominal pain. The patient states that she has had the pain for several months. Her pain is most severe after intercourse. The patient states that while undergoing the transvaginal ultrasound she began to experience significant pain. After the ultrasound was completed she began feeling lightheaded and nauseated. The patient denies syncope. The patient has not vomited. The patient denies fever, chills, headache, chest pain, shortness of breath, abdominal pain, diarrhea, dysuria, hematuria, vaginal bleeding, pain or swelling in her lower extremities. Prior to Admission Medications   Prescriptions Last Dose Informant Patient Reported? Taking? Azelastine HCl 137 MCG/SPRAY SOLN  Self No No   Si SPRAYS INTO EACH NOSTRIL 2 (TWO) TIMES A DAY AS NEEDED FOR RHINITIS USE IN EACH NOSTRIL AS DIRECTED   BD Insulin Syringe U/F 31G X 5/16" 0.5 ML MISC  Self No No   Sig: Use as directly with weekly methotrexate injection. Glucagon HCl (Glucagon Emergency) 1 MG/ML SOLR  Self Yes No   Sig: INJECT 1 MG AS DIRECTED AS NEEDED (WHEN PASSED OUT FROM LOW BLOOD SUGAR).    Insulin Disposable Pump (Omnipod 5 G6 Pod, Gen 5,) MISC  Self Yes No   Sig: INJECT 1 EACH UNDER THE SKIN EVERY THIRD DAY. E10.65   Insulin Pen Needle (BD PEN NEEDLE NASIM U/F) 32G X 4 MM MISC  Self No No   Sig: by Does not apply route 4 (four) times a day for 180 days   NovoLOG 100 UNIT/ML injection  Self Yes No   Sig: INJECT 3 TIMES A DAY WITH MEALS BASED ON ICR 4 AND ISF 30 FOR TDD 90 UNITS DAILY VIA INSULIN PUMP   OneTouch Verio test strip  Self Yes No   Sig: USE 4 TIMES A DAY BEFORE MEALS AND AT BEDTIME   Tresiba FlexTouch 100 units/mL injection pen  Self Yes No   Sig: INJECT 34 UNITS DAILY IN THE EVENT OF PUMP FAILURE E10.65   clonazePAM (KlonoPIN) 0.5 mg tablet  Self No No   Sig: Take 1 tablet (0.5 mg total) by mouth daily   folic acid (FOLVITE) 1 mg tablet  Self No No   Sig: Take 1 tablet (1 mg total) by mouth daily   gabapentin (Neurontin) 300 mg capsule  Self No No   Sig: Take 1 capsule (300 mg total) by mouth daily at bedtime PRN   glucagon 1 MG injection  Self Yes No   Si mg   hydroxychloroquine (PLAQUENIL) 200 mg tablet  Self No No   Sig: Take 1 tablet (200 mg total) by mouth 2 (two) times a day with meals   levonorgestrel-ethinyl estradiol (Altavera) 0.15-30 MG-MCG per tablet   No No   Sig: Take 1 tablet by mouth daily   levothyroxine 25 mcg tablet  Self No No   Sig: Take 1 tablet (25 mcg total) by mouth daily   methotrexate 50 MG/2ML injection  Self No No   Sig: Inject 0.8 mL (20 mg total) under the skin once a week   pancrelipase, Lip-Prot-Amyl, (CREON) 12,000 units capsule  Self No No   Sig: Take 36,000 units of lipase by mouth 3 (three) times a day with meals Take 3 capsules prior to each meal and 1 prior to snack   predniSONE 20 mg tablet  Self No No   Sig: Take 1 tablet (20 mg total) by mouth daily   promethazine (PHENERGAN) 12.5 MG tablet  Self No No   Sig: Take 1 tablet (12.5 mg total) by mouth every 6 (six) hours as needed for nausea or vomiting      Facility-Administered Medications: None       Past Medical History:   Diagnosis Date   • Abdominal pain    • Anaphylaxis    • Anxiety    • Anxiety and depression    • COVID-19 2020   • Delayed emergence from anesthesia 2023    Severe episode of emergence delirium (confused, combative) after MAC anesthetic on 2023 for EGD.  Received versed 2mg, >50mcg precedex, 5mg IV haldol, and 50mcg fentanyl. Waxing and waning episodes of agitation. Any future anesthetics should NOT be done at [facility]. • Delirium, induced by drug     anesthesia   • Depression    • Diabetes mellitus ([address])    • Disease of thyroid gland     Hashimoto   • DKA, type 1 ([address]) 05/11/2021   • Eating disorder     history of anorexia/bulemia 5389-6886   • Food intolerance    • Fracture of fibula     R Salter I   • [employer] disease    • Hashimoto's disease 02/21/2020   • Head injury    • Headache(784.0)    • Nasal congestion    • PTSD (post-traumatic stress disorder)    • Rectal bleed    • Seizures ([facility])    • Type 1 diabetes ([address])        Past Surgical History:   Procedure Laterality Date   • COLONOSCOPY     • KNEE SURGERY Right 07/06/2020   • NASAL SEPTOPLASTY W/ TURBINOPLASTY     • SINUS SURGERY     • SKIN BIOPSY     • TURBINOPLASTY N/A 3/22/2021    Procedure: Gerre Myra;  Surgeon: Rosario Dumont MD;  Location: [facility];  Service: ENT   • UPPER GASTROINTESTINAL ENDOSCOPY     • WISDOM TOOTH EXTRACTION     • WRIST SURGERY      left; Excision of ganglion       Family History   Problem Relation Age of Onset   • Hypertension Mother    • Migraines Mother         Headache   • Diabetes type II Mother    • Varicose Veins Mother    • Hyperlipidemia Mother    • Diabetes Mother    • Arthritis Mother    • Depression Mother    • Hearing loss Mother    • Anxiety disorder Mother    • Eczema Father    • Cholelithiasis Father    • Hypertension Father    • Sarcoidosis Father         Liver   • Hyperlipidemia Father    • Diabetes Father    • Coronary artery disease Father    • Nephrolithiasis Father    • Cirrhosis Father    • Alcohol abuse Father    • Thyroid disease Sister    • Hashimoto's thyroiditis Sister    • Alcohol abuse Brother    • Cancer Family    • Diabetes Family    • Hypertension Family      I have reviewed and agree with the history as documented.     E-Cigarette/Vaping   • E-Cigarette Use Never User      E-Cigarette/Vaping Substances   • Nicotine No    • THC No    • CBD No    • Flavoring No    • Other No    • Unknown No      Social History     Tobacco Use   • Smoking status: Never     Passive exposure: Never   • Smokeless tobacco: Never   • Tobacco comments:     Tobacco smoke exposure (Father smokes cigars)   Vaping Use   • Vaping Use: Never used   Substance Use Topics   • Alcohol use: Not Currently   • Drug use: Never        Review of Systems   Constitutional: Negative for chills, diaphoresis and fever. HENT: Negative for congestion and sore throat. Eyes: Negative for pain and redness. Respiratory: Negative for cough and shortness of breath. Cardiovascular: Negative for chest pain, palpitations and leg swelling. Gastrointestinal: Positive for nausea. Negative for abdominal pain, diarrhea and vomiting. Genitourinary: Negative for dysuria, flank pain and hematuria. Musculoskeletal: Negative for arthralgias and myalgias. Skin: Negative for color change, pallor and rash. Neurological: Positive for light-headedness. Negative for dizziness, syncope, weakness, numbness and headaches. All other systems reviewed and are negative. Physical Exam  ED Triage Vitals   Temperature Pulse Respirations Blood Pressure SpO2   09/05/23 2307 09/05/23 2155 09/05/23 2155 09/05/23 2155 09/05/23 2155   98.1 °F (36.7 °C) 96 (!) 40 121/79 100 %      Temp src Heart Rate Source Patient Position - Orthostatic VS BP Location FiO2 (%)   -- 09/05/23 2300 09/05/23 2300 09/05/23 2300 --    Monitor Lying Right arm       Pain Score       --                    Orthostatic Vital Signs  Vitals:    09/05/23 2155 09/05/23 2300 09/06/23 0000   BP: 121/79 113/77 98/65   Pulse: 96 80 80   Patient Position - Orthostatic VS:  Lying        Physical Exam  Vitals and nursing note reviewed. Constitutional:       General: She is not in acute distress. Appearance: Normal appearance. She is not ill-appearing, toxic-appearing or diaphoretic.       Comments: Anxious appearing   HENT:      Head: Normocephalic and atraumatic. Nose: Nose normal. No congestion or rhinorrhea. Mouth/Throat:      Mouth: Mucous membranes are moist.      Pharynx: Oropharynx is clear. Eyes:      General: No scleral icterus. Extraocular Movements: Extraocular movements intact. Conjunctiva/sclera: Conjunctivae normal.      Pupils: Pupils are equal, round, and reactive to light. Cardiovascular:      Rate and Rhythm: Normal rate and regular rhythm. Pulses: Normal pulses. Heart sounds: Normal heart sounds. No murmur heard. No friction rub. No gallop. Pulmonary:      Effort: Pulmonary effort is normal.      Breath sounds: Normal breath sounds. No wheezing, rhonchi or rales. Abdominal:      General: Abdomen is flat. Palpations: Abdomen is soft. Tenderness: There is abdominal tenderness in the left lower quadrant. There is no right CVA tenderness, left CVA tenderness, guarding or rebound. Musculoskeletal:         General: No swelling, tenderness, deformity or signs of injury. Normal range of motion. Cervical back: Normal range of motion and neck supple. No rigidity or tenderness. Right lower leg: No edema. Left lower leg: No edema. Lymphadenopathy:      Cervical: No cervical adenopathy. Skin:     General: Skin is warm and dry. Capillary Refill: Capillary refill takes less than 2 seconds. Coloration: Skin is not jaundiced or pale. Findings: No bruising, erythema, lesion or rash. Neurological:      General: No focal deficit present. Mental Status: She is alert and oriented to person, place, and time.          ED Medications  Medications   ondansetron (ZOFRAN) injection 4 mg (4 mg Intravenous Given 9/5/23 2211)   LORazepam (ATIVAN) injection 0.5 mg (0.5 mg Intravenous Given 9/5/23 2211)       Diagnostic Studies  Results Reviewed     Procedure Component Value Units Date/Time    POCT pregnancy, urine [[id number]] (Normal) Resulted: 09/05/23 2258    Lab Status: Final result Updated: 09/05/23 2258     EXT Preg Test, Ur Negative     Control Valid    Fingerstick Glucose (POCT) [[id number]]  (Abnormal) Collected: 09/05/23 2154    Lab Status: Final result Updated: 09/05/23 2155     POC Glucose 179 mg/dl                  No orders to display         Procedures  Procedures      ED Course                             SBIRT 20yo+    Flowsheet Row Most Recent Value   Initial Alcohol Screen: US AUDIT-C     1. How often do you have a drink containing alcohol? 0 Filed at: 09/05/2023 2314   2. How many drinks containing alcohol do you have on a typical day you are drinking? 0 Filed at: 09/05/2023 2314   3b. FEMALE Any Age, or MALE 65+: How often do you have 4 or more drinks on one occassion? 0 Filed at: 09/05/2023 2314   Audit-C Score 0 Filed at: 09/05/2023 2314   CHEPE: How many times in the past year have you. .. Used an illegal drug or used a prescription medication for non-medical reasons? Never Filed at: 09/05/2023 2314                Medical Decision Making  82-year-old female with history of type 1 diabetes, hypothyroidism, anxiety and depression who presents as a medical emergency after she began experiencing lightheadedness and nausea after undergoing a pelvic ultrasound as an outpatient. Vitals are within the normal limits. On exam the patient is anxious appearing, no focal neurologic deficits, heart is regular rate and rhythm, lungs are clear to auscultation bilaterally, abdomen is soft with left lower quadrant tenderness but no rebound or guarding, no lower extremity swelling or tenderness. Suspect the patient's symptoms are vagal response to pain from the ultrasound or anxiety. The patient had extensive lab work the afternoon eluding CBC, CMP, urinalysis, and inflammatory markers which is reviewed and without any concerning findings. We will additionally order EKG and urine pregnancy.   We will treat the patient's symptoms with Zofran and Ativan. We will request a stat read on the patient's pelvic ultrasound. EKG rate 78, sinus rhythm, normal axis, normal intervals, no ST elevation or depression. Pelvic ultrasound is unremarkable. The patient's symptoms gradually resolved. The patient is instructed to follow-up with her OB/GYN regarding the results of the ultrasound. Return precautions are given and the patient is discharged. Amount and/or Complexity of Data Reviewed  Labs: ordered. Risk  Prescription drug management. Disposition  Final diagnoses:   Abdominal pain   Lightheadedness   Nausea     Time reflects when diagnosis was documented in both MDM as applicable and the Disposition within this note     Time User Action Codes Description Comment    9/6/2023 12:31 AM Trisha Senia A Add [R10.9] Abdominal pain     9/6/2023 12:31 AM Trisha Senia A Add [R42] Lightheadedness     9/6/2023 12:31 AM Trisha Senia A Modify [R10.9] Abdominal pain     9/6/2023 12:31 AM Trisha Senia A Modify [R42] Lightheadedness     9/6/2023 12:32 AM Kaci Bijou Add [R11.0] Nausea       ED Disposition     ED Disposition   Discharge    Condition   Stable    Date/Time   Wed Sep 6, 2023 12:31 AM    Comment   Antoinette Cyr discharge to home/self care.                Follow-up Information     Follow up With Specialties Details Why Contact Info Additional [address] [facility] Emergency Department Emergency Medicine Go to  If symptoms worsen [address] [facility] Emergency Department, [address] [place] [address]          Discharge Medication List as of 9/6/2023 12:33 AM      CONTINUE these medications which have NOT CHANGED    Details   Azelastine HCl 137 MCG/SPRAY SOLN 2 SPRAYS INTO EACH NOSTRIL 2 (TWO) TIMES A DAY AS NEEDED FOR RHINITIS USE IN EACH NOSTRIL AS DIRECTED, Normal      BD Insulin Syringe U/F 31G X 5/16" 0.5 ML MISC Use as directly with weekly methotrexate injection. , Normal      clonazePAM (KlonoPIN) 0.5 mg tablet Take 1 tablet (0.5 mg total) by mouth daily, Starting Fri 7/90/5345, Normal      folic acid (FOLVITE) 1 mg tablet Take 1 tablet (1 mg total) by mouth daily, Starting Fri 3/31/2023, Normal      gabapentin (Neurontin) 300 mg capsule Take 1 capsule (300 mg total) by mouth daily at bedtime PRN, Starting Wed 2/8/2023, Normal      glucagon 1 MG injection 1 mg, Starting Wed 10/12/2022, Historical Med      Glucagon HCl (Glucagon Emergency) 1 MG/ML SOLR INJECT 1 MG AS DIRECTED AS NEEDED (WHEN PASSED OUT FROM LOW BLOOD SUGAR). , Historical Med      hydroxychloroquine (PLAQUENIL) 200 mg tablet Take 1 tablet (200 mg total) by mouth 2 (two) times a day with meals, Starting Fri 3/31/2023, Until Mon 3/25/2024, Normal      Insulin Pen Needle (BD PEN NEEDLE NASIM U/F) 32G X 4 MM MISC by Does not apply route 4 (four) times a day for 180 days, Starting Wed 6/5/2019, Until Thu 8/31/2023, Normal      levothyroxine 25 mcg tablet Take 1 tablet (25 mcg total) by mouth daily, Starting Wed 5/19/2021, Normal      methotrexate 50 MG/2ML injection Inject 0.8 mL (20 mg total) under the skin once a week, Starting Fri 3/3/2023, Normal      NovoLOG 100 UNIT/ML injection INJECT 3 TIMES A DAY WITH MEALS BASED ON ICR 4 AND ISF 30 FOR TDD 90 UNITS DAILY VIA INSULIN PUMP, Historical Med      OneTouch Verio test strip USE 4 TIMES A DAY BEFORE MEALS AND AT BEDTIME, Historical Med      pancrelipase, Lip-Prot-Amyl, (CREON) 12,000 units capsule Take 36,000 units of lipase by mouth 3 (three) times a day with meals Take 3 capsules prior to each meal and 1 prior to snack, Starting Wed 3/8/2023, Normal      promethazine (PHENERGAN) 12.5 MG tablet Take 1 tablet (12.5 mg total) by mouth every 6 (six) hours as needed for nausea or vomiting, Starting Mon 9/26/2022, Normal      Tresiba FlexTouch 100 units/mL injection pen INJECT 34 UNITS DAILY IN THE EVENT OF PUMP FAILURE E10.65, Historical Med      Insulin Disposable Pump (Omnipod 5 G6 Pod, Gen 5,) MISC INJECT 1 EACH UNDER THE SKIN EVERY THIRD DAY. E10.65, Historical Med      levonorgestrel-ethinyl estradiol (Altavera) 0.15-30 MG-MCG per tablet Take 1 tablet by mouth daily, Starting Tue 9/5/2023, Normal      predniSONE 20 mg tablet Take 1 tablet (20 mg total) by mouth daily, Starting Thu 8/31/2023, Normal           No discharge procedures on file. PDMP Review       Value Time User    PDMP Reviewed  Yes 7/9/2021  3:50 AM Cynthia Briggs MD           ED Provider  Attending physically available and evaluated [facility]. I managed the patient along with the ED Attending.     Electronically Signed by         Honey Plummer DO  09/06/23 9607

## 2023-09-06 NOTE — ED ATTENDING ATTESTATION
9/5/2023  IZaira DO, saw and evaluated the patient. I have discussed the patient with the resident/non-physician practitioner and agree with the resident's/non-physician practitioner's findings, Plan of Care, and MDM as documented in the resident's/non-physician practitioner's note, except where noted. All available labs and Radiology studies were reviewed. I was present for key portions of any procedure(s) performed by the resident/non-physician practitioner and I was immediately available to provide assistance. At this point I agree with the current assessment done in the Emergency Department. I have conducted an independent evaluation of this patient a history and physical is as follows:    35 yo female presents for evaluation of lower abd pain while undergoing pelvic ultrasound for RLQ abd pain - was ordered outpt by OBgyn dr yon mota. She c/o nausea without vomiting. States the pain is the same as it has been but now it is worse. No reported vaginal bleeding. Attempted to review ultrasound images from just now but not available. Will see if any images were able to be acquired and saved for review. Appears anxious  abd snd mild TTP RLQ. No r/g bs+    Imp: acute on chronic abd pain, currently RLQ plan: symptomatic tx, reassess. Labs from earlier reviewed.         ED Course         Critical Care Time  Procedures

## 2023-09-07 ENCOUNTER — OFFICE VISIT (OUTPATIENT)
Dept: FAMILY MEDICINE CLINIC | Facility: CLINIC | Age: 28
End: 2023-09-07
Payer: COMMERCIAL

## 2023-09-07 VITALS
HEART RATE: 95 BPM | DIASTOLIC BLOOD PRESSURE: 78 MMHG | WEIGHT: 133.6 LBS | OXYGEN SATURATION: 99 % | HEIGHT: 63 IN | SYSTOLIC BLOOD PRESSURE: 100 MMHG | TEMPERATURE: 97.2 F | BODY MASS INDEX: 23.67 KG/M2 | RESPIRATION RATE: 16 BRPM

## 2023-09-07 DIAGNOSIS — I95.9 HYPOTENSION, UNSPECIFIED HYPOTENSION TYPE: Primary | ICD-10-CM

## 2023-09-07 PROCEDURE — 99214 OFFICE O/P EST MOD 30 MIN: CPT | Performed by: NURSE PRACTITIONER

## 2023-09-07 NOTE — ASSESSMENT & PLAN NOTE
Blood pressure has been running low and pt is experiencing episodes of lightheadedness, particularly when she gets up too quickly. Her blood pressure is 100/78 in the office, but she has had diastolic readings under 50. Her labs were just completed and are unremarkable. Normal heart sounds. With her complex history would like to have her see nephrology to evaluate for an underlying cause for her blood pressure to be running so low.

## 2023-09-07 NOTE — PROGRESS NOTES
Name: Michael Mars      : 1995      MRN: [id number]  Encounter Provider: MARGARET Rojas  Encounter Date: 2023   Encounter department: [facility]     1. Hypotension, unspecified hypotension type  Assessment & Plan:  Blood pressure has been running low and pt is experiencing episodes of lightheadedness, particularly when she gets up too quickly. Her blood pressure is 100/78 in the office, but she has had diastolic readings under 50. Her labs were just completed and are unremarkable. Normal heart sounds. With her complex history would like to have her see nephrology to evaluate for an underlying cause for her blood pressure to be running so low. Orders:  -     Ambulatory Referral to Nephrology; Future         Subjective      Pt is a 35 yo female here for evaluation of low blood pressure and dizziness. Past medical history of GERD, DM I, hypothyroidism, lupus, bipolar disorder, anxiety, depression, She says when she was hospitalized in July for DKA, was noted to have low blood pressure and dizziness/lightheadedness. Blood pressure responded to IV hydration. She has continued with low blood pressure and episodes of lightheadedness since; notes that blood pressure went very though when at her recent pelvic US and was sent to the ER. They felt she may have had a vasovagal episode related to the pain from the US procedure. She says she gets lightheaded if she goes to get up too quickly or if she climbs a lot of steps. She denies chest pain, palpitations, headaches. She feels she is eating and drinking enough. Her DM control has actually improved a lot, however now she is having more dips in her sugar as opposed to hyperglycemic episodes since getting her new pump. She has 2 meals per day and has snacks in between. She says she is getting enough calories. She drinks plenty of water.   LMP was 815, she says she was bleeding heavily and passing large clots, her period lasted 15 days. Just had blood work done and hg/hct are normal.      Review of Systems   Constitutional: Negative for appetite change, fever and unexpected weight change. Respiratory: Negative for chest tightness and shortness of breath (only climbing stairs). Cardiovascular: Negative for chest pain, palpitations and leg swelling. Gastrointestinal: Negative for diarrhea, nausea and vomiting. Neurological: Positive for light-headedness. Negative for weakness and numbness. Current Outpatient Medications on File Prior to Visit   Medication Sig   • Azelastine HCl 137 MCG/SPRAY SOLN 2 SPRAYS INTO EACH NOSTRIL 2 (TWO) TIMES A DAY AS NEEDED FOR RHINITIS USE IN EACH NOSTRIL AS DIRECTED   • BD Insulin Syringe U/F 31G X 5/16" 0.5 ML MISC Use as directly with weekly methotrexate injection. • clonazePAM (KlonoPIN) 0.5 mg tablet Take 1 tablet (0.5 mg total) by mouth daily   • folic acid (FOLVITE) 1 mg tablet Take 1 tablet (1 mg total) by mouth daily   • gabapentin (Neurontin) 300 mg capsule Take 1 capsule (300 mg total) by mouth daily at bedtime PRN   • glucagon 1 MG injection 1 mg   • Glucagon HCl (Glucagon Emergency) 1 MG/ML SOLR INJECT 1 MG AS DIRECTED AS NEEDED (WHEN PASSED OUT FROM LOW BLOOD SUGAR).    • hydroxychloroquine (PLAQUENIL) 200 mg tablet Take 1 tablet (200 mg total) by mouth 2 (two) times a day with meals   • Insulin Disposable Pump (Omnipod 5 G6 Pod, Gen 5,) MISC INJECT 1 EACH UNDER THE SKIN EVERY THIRD DAY. E10.65   • levonorgestrel-ethinyl estradiol (Altavera) 0.15-30 MG-MCG per tablet Take 1 tablet by mouth daily   • levothyroxine 25 mcg tablet Take 1 tablet (25 mcg total) by mouth daily   • methotrexate 50 MG/2ML injection Inject 0.8 mL (20 mg total) under the skin once a week   • NovoLOG 100 UNIT/ML injection INJECT 3 TIMES A DAY WITH MEALS BASED ON ICR 4 AND ISF 30 FOR TDD 90 UNITS DAILY VIA INSULIN PUMP   • OneTouch Verio test strip USE 4 TIMES A DAY BEFORE MEALS AND AT BEDTIME   • pancrelipase, Lip-Prot-Amyl, (CREON) 12,000 units capsule Take 36,000 units of lipase by mouth 3 (three) times a day with meals Take 3 capsules prior to each meal and 1 prior to snack   • predniSONE 20 mg tablet Take 1 tablet (20 mg total) by mouth daily   • promethazine (PHENERGAN) 12.5 MG tablet Take 1 tablet (12.5 mg total) by mouth every 6 (six) hours as needed for nausea or vomiting   • Tresiba FlexTouch 100 units/mL injection pen INJECT 34 UNITS DAILY IN THE EVENT OF PUMP FAILURE E10.65   • Insulin Pen Needle (BD PEN NEEDLE NASIM U/F) 32G X 4 MM MISC by Does not apply route 4 (four) times a day for 180 days       Objective     /78 (BP Location: Left arm, Patient Position: Sitting, Cuff Size: Standard)   Pulse 95   Temp (!) 97.2 °F (36.2 °C) (Tympanic)   Resp 16   Ht 5' 2.5" (1.588 m)   Wt 60.6 kg (133 lb 9.6 oz)   LMP 08/15/2023 (Exact Date)   SpO2 99%   BMI 24.05 kg/m²     Physical Exam  Vitals reviewed. Constitutional:       Appearance: Normal appearance. She is well-developed and well-groomed. She is not ill-appearing or diaphoretic. Eyes:      General: Lids are normal.      Conjunctiva/sclera: Conjunctivae normal.   Cardiovascular:      Rate and Rhythm: Normal rate and regular rhythm. Pulses: Normal pulses. Heart sounds: Normal heart sounds. No murmur heard. Pulmonary:      Effort: Pulmonary effort is normal.      Breath sounds: Normal breath sounds. Musculoskeletal:      Right lower leg: No edema. Left lower leg: No edema. Skin:     General: Skin is dry. Neurological:      Mental Status: She is alert and oriented to person, place, and time. Psychiatric:         Attention and Perception: Attention normal.         Mood and Affect: Mood normal.         Speech: Speech normal.         Behavior: Behavior normal. Behavior is cooperative. Thought Content:  Thought content normal.         Cognition and Memory: Cognition normal. Judgment: Judgment normal.       MARGARET Hernandez

## 2023-09-08 ENCOUNTER — SOCIAL WORK (OUTPATIENT)
Dept: BEHAVIORAL/MENTAL HEALTH CLINIC | Facility: CLINIC | Age: 28
End: 2023-09-08
Payer: COMMERCIAL

## 2023-09-08 ENCOUNTER — TELEPHONE (OUTPATIENT)
Dept: OBGYN CLINIC | Facility: CLINIC | Age: 28
End: 2023-09-08

## 2023-09-08 DIAGNOSIS — F41.1 GENERALIZED ANXIETY DISORDER WITH PANIC ATTACKS: ICD-10-CM

## 2023-09-08 DIAGNOSIS — F41.0 GENERALIZED ANXIETY DISORDER WITH PANIC ATTACKS: ICD-10-CM

## 2023-09-08 DIAGNOSIS — F31.62 BIPOLAR DISORDER, CURRENT EPISODE MIXED, MODERATE (HCC): ICD-10-CM

## 2023-09-08 DIAGNOSIS — F43.12 CHRONIC POST-TRAUMATIC STRESS DISORDER (PTSD): ICD-10-CM

## 2023-09-08 DIAGNOSIS — F42.2 MIXED OBSESSIONAL THOUGHTS AND ACTS: Primary | ICD-10-CM

## 2023-09-08 PROCEDURE — 90834 PSYTX W PT 45 MINUTES: CPT | Performed by: SOCIAL WORKER

## 2023-09-08 NOTE — TELEPHONE ENCOUNTER
----- Message from Sasha Mcgovern MD sent at 9/8/2023  7:18 AM EDT -----  Please let patient know there are no cysts or other abnormalties. Her CBC is also normal.  She should speak with her PCP and GI re:  RLQ pain.

## 2023-09-08 NOTE — PSYCH
Behavioral Health Psychotherapy Progress Note    Psychotherapy Provided: Individual Psychotherapy     1. Mixed obsessional thoughts and acts        2. Generalized anxiety disorder with panic attacks        3. Chronic post-traumatic stress disorder (PTSD)        4. Bipolar disorder, current episode mixed, moderate ([address])            Goals addressed in session: Goal 1 and Goal 2     DATA:     Met with Jenny individually. Mood dysregulation has been unstable. PCP referred to Nephrology due to hypotension, syncopal episodes and mood dysregulation - possible cortisol or kidney issues. Discussed starting couples session. Going to GYN fetal med to discuss birth plan with her health concerns - if this is even possible. Discussed job, STD denied Cait Koch despite having government disability. Teri Almendarez may have Ca - this is triggering PTSD symptoms from recent death of family dog and Cait Koch witnessing the body. Still planning marriage - Jona Flores 'crying all the time' to parents how Cait Koch doesn't engage with her. Denied SI    During this session, this clinician used the following therapeutic modalities: Cognitive Processing Therapy and Supportive Psychotherapy    Substance Abuse was not addressed during this session. If the client is diagnosed with a co-occurring substance use disorder, please indicate any changes in the frequency or amount of use: . Stage of change for addressing substance use diagnoses: No substance use/Not applicable    ASSESSMENT:  Nessa Siegel presents with a Anxious and Depressed mood. her affect is Normal range and intensity and Tearful, which is congruent, with her mood and the content of the session. The client has made progress on their goals. Nessa Siegel presents with a low risk of suicide, low risk of self-harm, and low risk of harm to others. For any risk assessment that surpasses a "low" rating, a safety plan must be developed.     A safety plan was indicated: no  If yes, describe in detail     PLAN: Between sessions, Bob Ferrell will attend couples therapy, set boundaries with sister, monitor medical symptoms. At the next session, the therapist will use Cognitive Processing Therapy and Supportive Psychotherapy to address health, mood dysregulation, engagement. Behavioral Health Treatment Plan and Discharge Planning: Bob Ferrell is aware of and agrees to continue to work on their treatment plan. They have identified and are working toward their discharge goals.  yes    Visit start and stop times:    09/08/23  Start Time: 1420  Stop Time: 1510  Total Visit Time: 50 minutes

## 2023-09-08 NOTE — BH TREATMENT PLAN
Outpatient Behavioral Health Psychotherapy Treatment Plan     Bethany Reederna  1995      Date of Initial Psychotherapy Assessment: 8/12/19   Date of Current Treatment Plan: 9/8/23  Treatment Plan Target Date: 3/1/24  Treatment Plan Expiration Date:  TBD     Diagnosis:   1. Mixed obsessional thoughts and acts          2. Generalized anxiety disorder with panic attacks          3. Chronic post-traumatic stress disorder (PTSD)          4.  Bipolar disorder, current episode mixed, moderate ([facility])                Area(s) of Need: 'Im a hypochondriac at times', health obstacles, mood fluctuations, panic attacks     Long Term Goal 1 'I will be able to get my health triggered by stress under control'     Stage of Change: Preparation     Target Date for completion:  3/1/24             Anticipated therapeutic modalities: supportive therapy, cognitive processing, DBT             People identified to complete this goal: Fabrice Torres                    Objective 1: Fabrice Floyd discuss her search for another job in 2/4 session's throughout Tx plan                    Objective 2: Fabrice Floyd will explore other work from home options through employment Apps 4/7 days until options idenitified            Objective 3: Fabrice Floyd will monitor Lupus flare ups, DKA, pain  etc due to increased stress 7/7 days throughout Tx plan      Long Term Goal 2  I will process my family relationships including Bonita Otoole and our marriage     Stage of Change: Action     Target Date for completion: 3/1/24             Anticipated therapeutic modalities:  Supportive therapy, DBT, cognitive processing, TF-CBT             People identified to complete this goal: Fabrice Torres                    Objective 1: Fabrice Floyd will explore emotions related to distancing herself from sister (not considering her family any longer) 2/4 session's throughout Tx plan                    Objective 2: Fabrice Floyd will process PTSD triggers/emotions 8/10 sessions until end of Tx period     Long Term Goal 3 "I will get my moods under control'     Stage of Change: Preparation     Target Date for completion: 3/1/24             Anticipated therapeutic modalities: Supportive therapy, DBT, cognitive processing, CBT             People identified to complete this goal:  Jass Butlerna and Dr. Jai Grier                    Objective 1: Jass Corbett will begin taking prescribed meds daily 12/12 weeks until end of Tx period                    Objective 2: Jass Corbett will work to identify as to specific triggers leading to frequent mood fluctuations daily 8/12 weeks until end of Tx period      I am currently under the care of a [facility] psychiatric provider: yes     My [facility] psychiatric provider is: Dr. Ana Chen am currently taking psychiatric medications: Yes, but not as prescribed. (explain) insurance issue with Zoloft     I feel that I will be ready for discharge from mental health care when I reach the following (measurable goal/objective): 'I am such a mess I get anxious thinking about ending therapy'     For children and adults who have a legal guardian:          Has there been any change to custody orders and/or guardianship status? NA. If yes, attach updated documentation.     I have created my Crisis Plan and have been offered a copy of this plan     [address]: Diagnosis and Treatment Plan explained to [employer] acknowledges an understanding of their diagnosis.  Davidestefanía Shandra agrees to this treatment plan.     I have been offered a copy of this Treatment Plan. yes

## 2023-09-10 NOTE — PROGRESS NOTES
Assessment and Plan:   Ms. Patti Lewis a 22-year-old female with history significant for type 1 insulin-dependent diabetes mellitus diagnosed in 2019 and Hashimoto's thyroiditis diagnosed in 2020 who presents for a follow-up of an undifferentiated connective tissue disorder (diagnosed based on a positive SOHAM 1:80 nuclear, speckled, homogeneous patterns, arthralgias, malar rash/photosensitivity). She is currently on hydroxychloroquine 200 mg twice daily on the weekdays and 200 mg once daily on the weekend and subcutaneous methotrexate 20 mg once weekly. # Undifferentiated connective tissue disease  - Fabrice Floyd presents today for a follow-up of an undifferentiated connective tissue disease for which she is currently on hydroxychloroquine 200 mg twice daily on the weekdays and 200 mg once daily on the weekend and subcutaneous methotrexate 20 mg once weekly. Unfortunately since the last visit she has been dealing with persistent joint pain and swelling also occurring with episodic flareups. I question if this may have been triggered by the recent infections, temporarily holding the methotrexate and also increased personal stressors. Regardless, at this time with progressive complaints and due to the underlying type 1 diabetes mellitus it is not reasonable to continue treating the flareups with courses of steroids and I recommend a step up in her immunomodulating medications. I would like her to continue the hydroxychloroquine and methotrexate unchanged for now and I will plan to add on belimumab infusions 10 mg/kg every 2 weeks for 3 doses followed by 10 mg/kg every 4 weeks. She was counseled on the possible side effects including risk for infusion reactions, infections and also worsening depression/suicidal ideation. She is currently following with psychiatry and is receiving mental health counseling which is keeping her mood stable. I do not see a contraindication to adding on belimumab.   Of note there are plans from immunology to administer the flu vaccine and pneumococcal vaccines. If possible I would like her to receive both the immunizations prior to initiating the belimumab infusions.    - She we will update high risk medication and connective tissue disease lab monitoring prior to the follow-up visit and follow-up with ophthalmology for annual eye exams. Of note she is aware that methotrexate is teratogenic and I recommend discontinuing this 3 months prior to planning conception. We also discussed there is insufficient data on the safety of belimumab during pregnancy and around the time of pregnancy planning we will need to have a discussion regarding the risks and benefits. In the meanwhile she will continue with dual contraception including birth control pills and barrier contraception. She will also be scheduling an appointment with [facility] to discuss preconception counseling given her multiple comorbidities. I have spent a total time of 40 minutes on 09/11/23 in caring for this patient including Prognosis, Risks and benefits of tx options, Instructions for management, Impressions, Documenting in the medical record, Reviewing / ordering tests, medicine, procedures   and Obtaining or reviewing history  . Plan:  Diagnoses and all orders for this visit:    Undifferentiated connective tissue disease ([address])  -     CBC and differential; Future  -     Comprehensive metabolic panel; Future  -     C-reactive protein; Future  -     Sedimentation rate, automated; Future  -     C4 complement; Future  -     C3 complement; Future  -     Urinalysis with microscopic  -     Protein / creatinine ratio, urine  -     Anti-DNA antibody, double-stranded;  Future    Long-term use of immunosuppressant medication    Methotrexate, long term, current use    Long-term use of Plaquenil    Hashimoto's disease    Type 1 diabetes mellitus without complication (HCC)    Screening-pulmonary TB  -     Quantiferon TB Gold Plus; Future      Activities as tolerated. Exercise: try to maintain a low impact exercise regimen as much as possible. Continue other medications as prescribed by PCP and other specialists. RTC in 3 months.         HPI    INITIAL VISIT NOTE (6/2021):  Ms. Luis Yusuf a 26-year-old female with history significant for type 1 insulin-dependent diabetes mellitus diagnosed in 2019 and Hashimoto's thyroiditis (elevated thyroid microsomal and thyroglobulin antibodies) diagnosed in 2020, who presents for further evaluation of a positive SOHAM 1:80 nuclear, speckled, homogeneous patterns and rheumatoid factor of 10, in addition to widespread joint pains.  She is referred by MARGARET Shultz for a rheumatology consult.       Patient reports she started to feel unwell approximately 2 years ago and further evaluation for this led to the diagnosis of type 1 diabetes as well as the Hashimoto's thyroiditis. Keena Blake has been managing the diabetes with insulin and states for the hypothyroidism as a result of Hashimoto's thyroiditis she was only started on levothyroxine 25 mcg once daily approximately 1 month ago. Winona Libman has been a conflict between the endocrinologists she has seen in regards to starting the levothyroxine, but due to progressive complaints which the patient and her primary care physician felt was related to hypothyroidism she was finally started on thyroid supplementation 1 month ago. Keena Blake has not noticed a change in her symptoms so far and has not had a TSH rechecked yet.  The TSH was slightly elevated at 5.24 on 5/1 prior to starting the levothyroxine.  She is also scheduled to see a private endocrinologist for another opinion.     She reports over the past 1 and half years she has also started to experience joint and muscle pain which has been gradually progressive.  She experiences a degree of pain on a daily basis but states that her symptoms can spontaneously flare-up as well as with changes in the weather, especially when it is cold/damp/humid. Margo Oshea does feel better with the warmer weather and has also started utilizing a therapy pool which does help with her symptoms. Margo Oshea has noticed joint pains to affect her hands occasionally but prominently in her wrists.  She will notice pain in her shoulders and hips mostly with manipulation and range of motion.  She describes chronic pain that will also affect her knees as well as in her ankles and feet.  At times she will notice a muscle pain throughout her upper and lower extremities as well.  She has noticed muscle cramps to affect her hands and feet.  No significant joint pains in her elbows or low back.  She has not noticed any obvious swelling of her wrists but feels like they may be swollen as she can gauge this by her bracelet. Margo Oshea has also noticed swelling to affect her ankles.  She does experience morning stiffness which affects her diffusely and takes about 30-40 minutes to improve.  She tries to avoid Tylenol as this affects her blood glucose monitoring.  She has tried ibuprofen for her symptoms which has not helped significantly.     Other than the body pain she also describes chronic fatigue, unintentional weight gain and hair thinning.  She denies fevers, unintentional weight loss, focal alopecia, dry eyes, dry mouth, inflammatory eye disease, skin rash, psoriasis, photosensitivity, mouth/nose ulcers, swollen glands, pleuritic chest pain, shortness of breath, inflammatory bowel disease, blood clots (reports a history of 1 very early miscarriage), Raynaud's or a family history of autoimmune disease.     Testing done for her symptoms showed the positive SOHAM and borderline positive rheumatoid factor.  A rheumatoid factor was negative in 2019.  An ESR, CRP, anti CCP antibody, CK, Lyme antibody profile, anti neutrophilic cytoplasmic antibodies, Sjogren's antibodies and anti double-stranded DNA antibody were normal.  An MRI of her right knee and ultrasound of her right Achilles tendon in 2018 were normal.     In view of her complaints she was also evaluated by Neurology to rule out multiple sclerosis and currently there is no specific diagnosis from their perspective.  She did have an MRI of her brain done last year which showed a single focus of white matter change which has been stable since 2014.        7/13/2021:  Patient presents for a follow-up of a positive SOHAM. I reviewed her workup done following the last office visit which showed an unremarkable SOHAM specificity, C3, C4, antiphospholipid antibody testing, urinalysis, urine protein creatinine ratio, vitamin-D, CK, anti CCP antibody and HLA B27 antigen. X-rays of her hands and feet were unremarkable.       She reports there has been no change in her symptoms since the last office visit and she continues to describe the chronic fatigue, muscle aches/spasms as well as ongoing joint pains. She is scheduled for another endocrinology opinion tomorrow to see if any of her symptoms may be related to the underlying hypothyroidism, but this is not felt to be the case so far.      2/16/2022:  Patient presents for follow-up of an undifferentiated connective tissue disease. She is currently on hydroxychloroquine 200 mg twice daily that was started in July 2021. She reports in about September she started to notice a significant improvement in her well-being with being on the hydroxychloroquine. There was an improvement in her fatigue and joint pains. She had overall been doing well up until January but after she received the COVID-19 booster vaccination noticed a flare-up in her symptoms with more fatigue as well as significant joint pains which mostly affected her hands, wrists, knees and ankles. She has been noticing intermittent swelling of her fingers as well as ankles/feet.   She has been taking Tylenol alternating with ibuprofen but is unsure if the medications are specifically helping her or if the side effects as a result of the vaccination are gradually improving on their own. She was also evaluated by Gastroenterology and had an upper endoscopy done which showed chronic gastritis so she was advised to minimize NSAID use. No recent steroids. Except for the pain she has also had a few occurrences of redness on her face in a butterfly pattern as well as affecting her forehead. She has noticed photosensitivity and states while she was at the beach in August she developed a skin rash in sun-exposed areas. She has been more careful with applying sunscreen as well as covering up in the sun. She denies fevers, weight loss, alopecia, mouth/nose ulcers, swollen glands or pleuritic chest pain. 6/22/2022:  Patient presents for follow-up of an undifferentiated connective tissue disease. She is currently on hydroxychloroquine 200 mg twice daily that was started in July 2021. I reviewed her blood work from February which showed a normal CBC, CMP, ESR, CRP, C3, C4, double-stranded DNA antibody, urinalysis and urine protein creatinine ratio. She reports overall since the last office visit she has been fairly stable except for an episode of pericarditis following the COVID-19 booster vaccination in January. She was seen by Cardiology and prescribed colchicine to take for 3 months. She reports the symptoms have mostly resolved and only on occasion will she notice some chest pain which is not long-lasting. She reports with sun exposure she will start to notice more joint pains and fatigue. For the most part she deals with some degree of joint pain on a daily basis and still notices swelling affecting her hands and ankles. She will be starting occupational therapy as due to the joint pain in her hands she has started to feel weakness in her hand strength. No fevers, unintentional weight loss, skin rashes or mouth/nose ulcers.     Although she still endorses symptoms she reports overall she has been feeling much better since starting the hydroxychloroquine. 10/27/2022:  Patient presents for a follow-up of an undifferentiated connective disease. She is currently on hydroxychloroquine 200 mg twice daily that was started in July 2021 and oral methotrexate 15 mg once weekly that was started in June 2022. I reviewed her recent labs which showed an unremarkable CBC, CMP, ESR, CRP, C3, C4, double-stranded DNA antibody and urine protein creatinine ratio. She reports she has been doing well without any joint pains and the methotrexate really seems to have helped her. With initially starting it she did notice numbness in her hand and legs for which she was seen in the emergency department but as it was unclear if this was related to the methotrexate I requested she resume it and the symptoms have not been consistent. No skin rashes or mouth/nose ulcers. She does report chronic symptoms of abdominal pain, nausea, vomiting and greasy stools for which she has been following with Gastroenterology. A CT abdomen was unremarkable. She is scheduled for a HIDA scan. 3/2/2023:  Patient presents for a follow-up of an undifferentiated connective disease. She is currently on hydroxychloroquine 200 mg twice daily that was started in July 2021 and oral methotrexate 20 mg once weekly that was started in June 2022. I reviewed her recent labs which showed an unremarkable CBC, CMP, ESR, CRP, C3, C4, double-stranded DNA antibody and urine protein creatinine ratio. She has been doing well since the last office visit and denies any complaints today. No symptoms such as true fevers, unintentional weight loss, alopecia, skin rashes, mouth/nose ulcers, swollen glands, pleuritic chest pain or joint pain/swelling/stiffness. She has been tolerating her medications well and is up-to-date with her annual eye exams but reports since December she has been dealing with recurrent infections.   She took a while to recover from [facility] and recently was diagnosed with back to back sinus infections requiring antibiotic use.      9/11/2023:  Patient presents for a follow-up of an undifferentiated connective disease. She is currently on hydroxychloroquine 200 mg twice daily on the weekdays and once daily on the weekend that was started in July 2021 and oral methotrexate 20 mg once weekly that was started in June 2022. I reviewed her recent labs which showed an unremarkable CBC, CMP, ESR, CRP, C3, double-stranded DNA antibody and urine protein creatinine ratio. A C4 complement was slightly decreased at 16. Unfortunately since the last visit she has been feeling unwell with increased fatigue and widespread body pain that has been flaring up. This is leading to a significant impact on her mental health. She reports especially over the past month she has been in a constant flare. Prior to this she did have an infection for which she was on antibiotics and held the injectable methotrexate for 1 week. Initially she was trying to manage through the symptoms but did contact me on August 31 at which time I prescribed her prednisone 20 mg once daily for 7 days which did help. The steroids do raise her blood sugars up to the 400s but she has an insulin pump which will manage the elevated blood sugars. After completing the steroids she is still not feeling well and is very upset and tearful at today's visit. Apart from the joint pain she has also been noticing swelling that can primarily affect her hands, wrists, knees and ankles. The steroids did help with the joint swelling. She has noticed occurrence of a skin rash on her face as well. She has been tolerating the hydroxychloroquine and methotrexate well overall but has been dealing with recurrent infections which was present even prior to the initiation of methotrexate. She is following with immunology to determine if she may have an underlying immunodeficiency.   She is up-to-date with her annual eye exams. The following portions of the patient's history were reviewed and updated as appropriate: allergies, current medications, past family history, past medical history, past social history, past surgical history and problem list.      Review of Systems  Constitutional: Negative for weight change, fevers, chills, night sweats. Positive for fatigue. ENT/Mouth: Negative for hearing changes, ear pain, nasal congestion, sinus pain, hoarseness, sore throat, rhinorrhea, swallowing difficulty. Eyes: Negative for pain, redness, discharge, vision changes. Cardiovascular: Negative for chest pain, SOB, palpitations. Respiratory: Negative for cough, sputum, wheezing, dyspnea. Gastrointestinal: Negative for nausea, vomiting, diarrhea, pain. Genitourinary: Negative for dysuria, urinary frequency, hematuria. Musculoskeletal: As per HPI. Skin: Negative for skin rash currently, color changes. Neuro: Negative for weakness, numbness, tingling, loss of consciousness. Psych: Negative for depression. Positive for depression. Heme/Lymph: Negative for easy bruising, bleeding, lymphadenopathy. Past Medical History:   Diagnosis Date   • Abdominal pain    • Anaphylaxis    • Anxiety    • Anxiety and depression    • COVID-19 12/2020   • Delayed emergence from anesthesia 03/23/2023    Severe episode of emergence delirium (confused, combative) after MAC anesthetic on 03/2023 for EGD. Received versed 2mg, >50mcg precedex, 5mg IV haldol, and 50mcg fentanyl. Waxing and waning episodes of agitation. Any future anesthetics should NOT be done at [facility].    • Delirium, induced by drug     anesthesia   • Depression    • Diabetes mellitus ([address] [facility])    • Disease of thyroid gland     Hashimoto   • DKA, type 1 ([address] [facility]) 05/11/2021   • Eating disorder     history of anorexia/bulemia [id number]   • Food intolerance    • Fracture of fibula     R Salter I   • Cipriano Mainland disease    • Hashimoto's disease 02/21/2020   • Head injury    • Headache(784.0)    • Nasal congestion    • PTSD (post-traumatic stress disorder)    • Rectal bleed    • Seizures (HCC)    • Type 1 diabetes (HCC)        Past Surgical History:   Procedure Laterality Date   • COLONOSCOPY     • KNEE SURGERY Right 07/06/2020   • NASAL SEPTOPLASTY W/ TURBINOPLASTY     • SINUS SURGERY     • SKIN BIOPSY     • TURBINOPLASTY N/A 3/22/2021    Procedure: Rosalinda Killer;  Surgeon: Feliberto Taylor MD;  Location: [facility];  Service: ENT   • UPPER GASTROINTESTINAL ENDOSCOPY     • WISDOM TOOTH EXTRACTION     • WRIST SURGERY      left; Excision of ganglion       Social History     Socioeconomic History   • Marital status: /Civil Union     Spouse name: Not on file   • Number of children: 0   • Years of education: Not on file   • Highest education level: Associate degree: academic program   Occupational History   • Occupation: unemployed   Tobacco Use   • Smoking status: Never     Passive exposure: Never   • Smokeless tobacco: Never   • Tobacco comments:     Tobacco smoke exposure (Father smokes cigars)   Vaping Use   • Vaping Use: Never used   Substance and Sexual Activity   • Alcohol use: Not Currently   • Drug use: Never   • Sexual activity: Yes     Partners: Male     Birth control/protection: Condom Male, OCP   Other Topics Concern   • Not on file   Social History Narrative    Student at [employer] a poor diet; low in vegetables, high in sweets    Dental care, regularly    Lives with parents    Sleeps 8-10 hours a day        Do you have pets? Dog,cat Is pet allowed in bedroom? Yes    Are you a smoker? Never    Does anyone smoke in your home? No       Do you live with smokers? Yes    Travel Powers frequently? No   How many times a year?  N/A      Social Determinants of Health     Financial Resource Strain: Medium Risk (4/12/2021)    Overall Financial Resource Strain (CARDIA)    • Difficulty of Paying Living Expenses: Somewhat hard   Food Insecurity: Not on file   Transportation Needs: Not on file   Physical Activity: Insufficiently Active (8/7/2020)    Exercise Vital Sign    • Days of Exercise per Week: 7 days    • Minutes of Exercise per Session: 10 min   Stress: Stress Concern Present (8/7/2020)    [address]    • Feeling of Stress : Rather much   Social Connections: Unknown (8/7/2020)    Social Connection and Isolation Panel [NHANES]    • Frequency of Communication with Friends and Family: More than three times a week    • Frequency of Social Gatherings with Friends and Family: Not on file    • Attends Jew Services: Not on file    • Active Member of Clubs or Organizations: No    • Attends Club or Organization Meetings: Never    • Marital Status: Never    Intimate Partner Violence: Not At Risk (8/7/2020)    Humiliation, Afraid, Rape, and Kick questionnaire    • Fear of Current or Ex-Partner: No    • Emotionally Abused: No    • Physically Abused: No    • Sexually Abused: No   Housing Stability: Not on file       Family History   Problem Relation Age of Onset   • Hypertension Mother    • Migraines Mother         Headache   • Diabetes type II Mother    • Varicose Veins Mother    • Hyperlipidemia Mother    • Diabetes Mother    • Arthritis Mother    • Depression Mother    • Hearing loss Mother    • Anxiety disorder Mother    • Eczema Father    • Cholelithiasis Father    • Hypertension Father    • Sarcoidosis Father         Liver   • Hyperlipidemia Father    • Diabetes Father    • Coronary artery disease Father    • Nephrolithiasis Father    • Cirrhosis Father    • Alcohol abuse Father    • Thyroid disease Sister    • Hashimoto's thyroiditis Sister    • Alcohol abuse Brother    • Cancer Family    • Diabetes Family    • Hypertension Family        Allergies   Allergen Reactions   • Insulin Glargine Other (See Comments)     LANTUS BRAND -Burning and redness under skin        Current Outpatient Medications:   •  Azelastine HCl 137 MCG/SPRAY SOLN, 2 SPRAYS INTO EACH NOSTRIL 2 (TWO) TIMES A DAY AS NEEDED FOR RHINITIS USE IN EACH NOSTRIL AS DIRECTED, Disp: 90 mL, Rfl: 0  •  BD Insulin Syringe U/F 31G X 5/16" 0.5 ML MISC, Use as directly with weekly methotrexate injection. , Disp: 100 each, Rfl: 0  •  clonazePAM (KlonoPIN) 0.5 mg tablet, Take 1 tablet (0.5 mg total) by mouth daily, Disp: 30 tablet, Rfl: 2  •  folic acid (FOLVITE) 1 mg tablet, Take 1 tablet (1 mg total) by mouth daily, Disp: 90 tablet, Rfl: 3  •  gabapentin (Neurontin) 300 mg capsule, Take 1 capsule (300 mg total) by mouth daily at bedtime PRN, Disp: 90 capsule, Rfl: 3  •  glucagon 1 MG injection, 1 mg, Disp: , Rfl:   •  Glucagon HCl (Glucagon Emergency) 1 MG/ML SOLR, INJECT 1 MG AS DIRECTED AS NEEDED (WHEN PASSED OUT FROM LOW BLOOD SUGAR). , Disp: , Rfl:   •  hydroxychloroquine (PLAQUENIL) 200 mg tablet, Take 1 tablet (200 mg total) by mouth 2 (two) times a day with meals, Disp: 180 tablet, Rfl: 3  •  Insulin Disposable Pump (Omnipod 5 G6 Pod, Gen 5,) MISC, INJECT 1 EACH UNDER THE SKIN EVERY THIRD DAY. E10.65, Disp: , Rfl:   •  Insulin Pen Needle (BD PEN NEEDLE NASIM U/F) 32G X 4 MM MISC, by Does not apply route 4 (four) times a day for 180 days, Disp: 400 each, Rfl: 3  •  levonorgestrel-ethinyl estradiol (Altavera) 0.15-30 MG-MCG per tablet, Take 1 tablet by mouth daily, Disp: 84 tablet, Rfl: 3  •  levothyroxine 25 mcg tablet, Take 1 tablet (25 mcg total) by mouth daily, Disp: 60 tablet, Rfl: 0  •  methotrexate 50 MG/2ML injection, Inject 0.8 mL (20 mg total) under the skin once a week, Disp: 4 mL, Rfl: 5  •  NovoLOG 100 UNIT/ML injection, INJECT 3 TIMES A DAY WITH MEALS BASED ON ICR 4 AND ISF 30 FOR TDD 90 UNITS DAILY VIA INSULIN PUMP, Disp: , Rfl:   •  OneTouch Verio test strip, USE 4 TIMES A DAY BEFORE MEALS AND AT BEDTIME, Disp: , Rfl:   •  pancrelipase, Lip-Prot-Amyl, (CREON) 12,000 units capsule, Take 36,000 units of lipase by mouth 3 (three) times a day with meals Take 3 capsules prior to each meal and 1 prior to snack, Disp: 120 capsule, Rfl: 3  •  predniSONE 20 mg tablet, Take 1 tablet (20 mg total) by mouth daily, Disp: 7 tablet, Rfl: 0  •  promethazine (PHENERGAN) 12.5 MG tablet, Take 1 tablet (12.5 mg total) by mouth every 6 (six) hours as needed for nausea or vomiting, Disp: 30 tablet, Rfl: 0  •  Tresiba FlexTouch 100 units/mL injection pen, INJECT 34 UNITS DAILY IN THE EVENT OF PUMP FAILURE E10.65, Disp: , Rfl:       Objective:    Vitals:    09/11/23 0840   BP: 102/82   Pulse: 92       Physical Exam  General: Well appearing, well nourished, in no distress. Oriented x 3, normal mood and affect. Ambulating without difficulty. Skin: Good turgor, no rash today, unusual bruising or prominent lesions. Hair: Normal texture and distribution. Nails: Normal color, no deformities. HEENT:  Head: Normocephalic, atraumatic. Eyes: Conjunctiva clear, sclera non-icteric, EOM intact. Extremities: No amputations or deformities, cyanosis, edema. Musculoskeletal:    There is no soft tissue swelling visualized at this time. Neurologic: Alert and oriented. No focal neurological deficits appreciated. Psychiatric: Tearful and upset. David Carrion M.D.   Rheumatology

## 2023-09-11 ENCOUNTER — TELEPHONE (OUTPATIENT)
Dept: RHEUMATOLOGY | Facility: CLINIC | Age: 28
End: 2023-09-11

## 2023-09-11 ENCOUNTER — APPOINTMENT (OUTPATIENT)
Dept: LAB | Facility: CLINIC | Age: 28
End: 2023-09-11
Payer: COMMERCIAL

## 2023-09-11 ENCOUNTER — OFFICE VISIT (OUTPATIENT)
Dept: RHEUMATOLOGY | Facility: CLINIC | Age: 28
End: 2023-09-11
Payer: COMMERCIAL

## 2023-09-11 VITALS — SYSTOLIC BLOOD PRESSURE: 102 MMHG | DIASTOLIC BLOOD PRESSURE: 82 MMHG | HEART RATE: 92 BPM

## 2023-09-11 DIAGNOSIS — Z79.60 LONG-TERM USE OF IMMUNOSUPPRESSANT MEDICATION: ICD-10-CM

## 2023-09-11 DIAGNOSIS — Z79.631 METHOTREXATE, LONG TERM, CURRENT USE: ICD-10-CM

## 2023-09-11 DIAGNOSIS — Z11.1 SCREENING-PULMONARY TB: ICD-10-CM

## 2023-09-11 DIAGNOSIS — Z79.899 LONG-TERM USE OF PLAQUENIL: ICD-10-CM

## 2023-09-11 DIAGNOSIS — M35.9 UNDIFFERENTIATED CONNECTIVE TISSUE DISEASE (HCC): Primary | ICD-10-CM

## 2023-09-11 DIAGNOSIS — E10.9 TYPE 1 DIABETES MELLITUS WITHOUT COMPLICATION (HCC): ICD-10-CM

## 2023-09-11 DIAGNOSIS — E06.3 HASHIMOTO'S DISEASE: ICD-10-CM

## 2023-09-11 LAB
DEPRECATED S PNEUM14 IGG SER-MCNC: 1.1 UG/ML
DEPRECATED S PNEUM19 IGG SER-MCNC: 1.7 UG/ML
DEPRECATED S PNEUM23 IGG SER-MCNC: 3.6 UG/ML
DEPRECATED S PNEUM3 IGG SER-MCNC: 1.1 UG/ML
DEPRECATED S PNEUM4 IGG SER-MCNC: 0.1 UG/ML
DEPRECATED S PNEUM8 AB SER-MCNC: 21.5 UG/ML
DEPRECATED S PNEUM9 IGG SER-MCNC: 20.4 UG/ML
FLUBV RNA SPEC QL NAA+PROBE: 1.4 UG/ML
S PNEUM DA 18C IGG SER-MCNC: 0.1 UG/ML
S PNEUM DA 19A IGG SER-MCNC: 1.3 UG/ML
S PNEUM DA 6B IGG SER-MCNC: 0.1 UG/ML
SL AMB PNEUMO AB TYPE 17 (17F): 1.3 UG/ML
SL AMB PNEUMO AB TYPE 20: 3.7 UG/ML
SL AMB PNEUMO AB TYPE 22 (22F): 1.8 UG/ML
SL AMB PNEUMO AB TYPE 2: 7 UG/ML
SL AMB PNEUMO AB TYPE 34 (10A): 0.3 UG/ML
SL AMB PNEUMO AB TYPE 43 (11A): 0.3 UG/ML
SL AMB PNEUMO AB TYPE 54 (15B): 1.4 UG/ML
SL AMB PNEUMO AB TYPE 5: 0.4 UG/ML
SL AMB PNEUMO AB TYPE 70 (33F): 0.7 UG/ML
STREP PNEUMO TYPE 1: 3.1 UG/ML
STREP PNEUMO TYPE 51: 0.1 UG/ML
STREP PNEUMO TYPE 68: 2.4 UG/ML

## 2023-09-11 PROCEDURE — 86480 TB TEST CELL IMMUN MEASURE: CPT

## 2023-09-11 PROCEDURE — 99215 OFFICE O/P EST HI 40 MIN: CPT | Performed by: INTERNAL MEDICINE

## 2023-09-11 PROCEDURE — 36415 COLL VENOUS BLD VENIPUNCTURE: CPT

## 2023-09-11 NOTE — TELEPHONE ENCOUNTER
Please start prior authorization for IV Benlysta infusions 10 mg/kg every 2 weeks for 3 doses followed by 10 mg/kg every 4 weeks for systemic lupus erythematosus. She would like to set up at the [facility]. She has previously tried hydroxychloroquine and methotrexate.

## 2023-09-13 ENCOUNTER — TELEPHONE (OUTPATIENT)
Dept: NEPHROLOGY | Facility: CLINIC | Age: 28
End: 2023-09-13

## 2023-09-13 LAB
GAMMA INTERFERON BACKGROUND BLD IA-ACNC: 0.04 IU/ML
M TB IFN-G BLD-IMP: NEGATIVE
M TB IFN-G CD4+ BCKGRND COR BLD-ACNC: 0 IU/ML
M TB IFN-G CD4+ BCKGRND COR BLD-ACNC: 0 IU/ML
MITOGEN IGNF BCKGRD COR BLD-ACNC: >10 IU/ML

## 2023-09-13 NOTE — TELEPHONE ENCOUNTER
New Patient Intake Form   Patient Details   Sarah Jackson     1995     [id number]     Insurance Information   Name of Cathie Nesbitt [facility] REP   Does the patient need an insurance referral? Pt   If patient has Pitney James, please ask if they will be using their Pitney James. Appointment Information   Who is calling to schedule? If not patient, what is callers name? Pt   Referring Provider Sarina Sheriff, [address] [facility]   Reason for Appt (Diagnosis) I95.9 (ICD-10-CM) - Hypotension, unspecified hypotension type   Does Patient have labs/urine done at [facility]? If not, where do they go? List the date of last lab / urine  *Please try to get labs 2 years back if not at  Yes   Has patient been hospitalized recently? If yes, list name and location of hospital they were in 7/7/23 [facility]   Has patient been seen by a Nephrologist before? If yes, list name, location and phone number No   Has the patient had renal imaging done? If so, list the most recent date and type of imaging No   Does patient have a history of Kidney Stones? No   Appointment Details   Is there a referral on file?  Yes    Appointment Date 12/07/2023   Location [place]    Miscellaneous  Added to wait list

## 2023-09-14 ENCOUNTER — SOCIAL WORK (OUTPATIENT)
Dept: BEHAVIORAL/MENTAL HEALTH CLINIC | Facility: CLINIC | Age: 28
End: 2023-09-14
Payer: COMMERCIAL

## 2023-09-14 DIAGNOSIS — F41.1 GENERALIZED ANXIETY DISORDER WITH PANIC ATTACKS: Primary | ICD-10-CM

## 2023-09-14 DIAGNOSIS — F31.62 BIPOLAR DISORDER, CURRENT EPISODE MIXED, MODERATE (HCC): ICD-10-CM

## 2023-09-14 DIAGNOSIS — F41.0 GENERALIZED ANXIETY DISORDER WITH PANIC ATTACKS: Primary | ICD-10-CM

## 2023-09-14 DIAGNOSIS — F42.2 MIXED OBSESSIONAL THOUGHTS AND ACTS: ICD-10-CM

## 2023-09-14 DIAGNOSIS — F43.12 CHRONIC POST-TRAUMATIC STRESS DISORDER (PTSD): ICD-10-CM

## 2023-09-14 PROCEDURE — 90837 PSYTX W PT 60 MINUTES: CPT | Performed by: COUNSELOR
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
## 2023-09-15 ENCOUNTER — SOCIAL WORK (OUTPATIENT)
Dept: BEHAVIORAL/MENTAL HEALTH CLINIC | Facility: CLINIC | Age: 28
End: 2023-09-15
Payer: COMMERCIAL

## 2023-09-15 DIAGNOSIS — F41.0 GENERALIZED ANXIETY DISORDER WITH PANIC ATTACKS: ICD-10-CM

## 2023-09-15 DIAGNOSIS — F31.62 BIPOLAR DISORDER, CURRENT EPISODE MIXED, MODERATE (HCC): ICD-10-CM

## 2023-09-15 DIAGNOSIS — F43.12 CHRONIC POST-TRAUMATIC STRESS DISORDER (PTSD): ICD-10-CM

## 2023-09-15 DIAGNOSIS — F41.1 GENERALIZED ANXIETY DISORDER WITH PANIC ATTACKS: ICD-10-CM

## 2023-09-15 DIAGNOSIS — F42.2 MIXED OBSESSIONAL THOUGHTS AND ACTS: Primary | ICD-10-CM

## 2023-09-15 PROBLEM — M32.8 OTHER FORMS OF SYSTEMIC LUPUS ERYTHEMATOSUS (HCC): Status: ACTIVE | Noted: 2023-09-15

## 2023-09-15 PROCEDURE — 90834 PSYTX W PT 45 MINUTES: CPT | Performed by: SOCIAL WORKER

## 2023-09-15 RX ORDER — SODIUM CHLORIDE 9 MG/ML
20 INJECTION, SOLUTION INTRAVENOUS ONCE
OUTPATIENT
Start: 2023-09-25

## 2023-09-15 RX ORDER — ACETAMINOPHEN 325 MG/1
650 TABLET ORAL ONCE
OUTPATIENT
Start: 2023-09-25

## 2023-09-15 RX ORDER — DIPHENHYDRAMINE HCL 25 MG
25 TABLET ORAL ONCE
OUTPATIENT
Start: 2023-09-25
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
## 2023-09-15 NOTE — TELEPHONE ENCOUNTER
Spoke with [employer] of [facility] and patient does not require a PA from them. Please place therapy plan for [facility].

## 2023-09-18 NOTE — TELEPHONE ENCOUNTER
Might be a good idea to delay the infusion then so she can build up immunity from the vaccines. Would recommend the infusion be started atleast 2 weeks after the vaccines.

## 2023-09-18 NOTE — TELEPHONE ENCOUNTER
Spoke with patient and relayed the dates she wanted us to know she is having the flu vaccine and pneumonia vaccine on 9/21.

## 2023-09-20 ENCOUNTER — TELEMEDICINE (OUTPATIENT)
Dept: PSYCHIATRY | Facility: CLINIC | Age: 28
End: 2023-09-20
Payer: COMMERCIAL

## 2023-09-20 DIAGNOSIS — F42.2 MIXED OBSESSIONAL THOUGHTS AND ACTS: ICD-10-CM

## 2023-09-20 DIAGNOSIS — F31.62 BIPOLAR DISORDER, CURRENT EPISODE MIXED, MODERATE (HCC): Primary | ICD-10-CM

## 2023-09-20 DIAGNOSIS — F41.1 GENERALIZED ANXIETY DISORDER WITH PANIC ATTACKS: ICD-10-CM

## 2023-09-20 DIAGNOSIS — F41.0 GENERALIZED ANXIETY DISORDER WITH PANIC ATTACKS: ICD-10-CM

## 2023-09-20 DIAGNOSIS — F51.04 PSYCHOPHYSIOLOGICAL INSOMNIA: ICD-10-CM

## 2023-09-20 DIAGNOSIS — F43.12 CHRONIC POST-TRAUMATIC STRESS DISORDER (PTSD): ICD-10-CM

## 2023-09-20 PROCEDURE — 99213 OFFICE O/P EST LOW 20 MIN: CPT | Performed by: PSYCHIATRY & NEUROLOGY

## 2023-09-20 PROCEDURE — 90833 PSYTX W PT W E/M 30 MIN: CPT | Performed by: PSYCHIATRY & NEUROLOGY

## 2023-09-20 RX ORDER — ZOLPIDEM TARTRATE 10 MG/1
10 TABLET ORAL
Qty: 30 TABLET | Refills: 0 | Status: SHIPPED | OUTPATIENT
Start: 2023-09-20

## 2023-09-20 RX ORDER — LORAZEPAM 0.5 MG/1
0.5 TABLET ORAL 2 TIMES DAILY PRN
Qty: 60 TABLET | Refills: 0 | Status: SHIPPED | OUTPATIENT
Start: 2023-09-20

## 2023-09-20 NOTE — PSYCH
Virtual Regular Visit    Verification of patient location:    Patient is located at Home in the following state in which I hold an active license [place]      Assessment/Plan:    Problem List Items Addressed This Visit        Other    Bipolar affective disorder ([address]) - Primary    Relevant Medications    LORazepam (Ativan) 0.5 mg tablet    zolpidem (AMBIEN) 10 mg tablet    Chronic post-traumatic stress disorder (PTSD)    Relevant Medications    LORazepam (Ativan) 0.5 mg tablet    zolpidem (AMBIEN) 10 mg tablet    Generalized anxiety disorder with panic attacks    Relevant Medications    LORazepam (Ativan) 0.5 mg tablet    zolpidem (AMBIEN) 10 mg tablet    OCD (obsessive compulsive disorder)    Relevant Medications    LORazepam (Ativan) 0.5 mg tablet    zolpidem (AMBIEN) 10 mg tablet   Other Visit Diagnoses     Psychophysiological insomnia        Relevant Medications    LORazepam (Ativan) 0.5 mg tablet    zolpidem (AMBIEN) 10 mg tablet                 Reason for visit is   Chief Complaint   Patient presents with   • Virtual Regular Visit        Encounter provider Kan Beyer MD    Provider located at [address] [place] [id number] [phone]      Recent Visits  Date Type Provider Dept   09/20/23 [facility] Miri Lockett MD [facility]   Showing recent visits within past 7 days and meeting all other requirements  Future Appointments  No visits were found meeting these conditions. Showing future appointments within next 150 days and meeting all other requirements       The patient was identified by name and date of birth. Solitario Hong was informed that this is a telemedicine visit and that the visit is being conducted [place]. She agrees to proceed. .  My office door was closed. No one else was in the room.   She acknowledged consent and understanding of privacy and security of the video platform. The patient has agreed to participate and understands they can discontinue the visit at any time. Patient is aware this is a billable service. Subjective  Haleigh Frey is a 34 y.o. female with Bipolar do  . Since last seen Phoebe Barrios has been off medications except for Clonazepam prn. She stated she still struggles regulating her emotions and feels anxious often.   She stated she will not start a mood stabilizer because she did not have a good response in the past and she felt she had the same struggles with her mood even when she was taking medications. She tried Sertraline but she experienced increased mood lability and SI and she stopped the medication after 3 weeks.      She stated she decided to move back with her parents to be able to closely follow up with all her specialists . She recently  her long time boyfriend.     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 She stated that after she started working the amount of stress has caused her physical and emotional health to decline. She applied for STD and is waiting approval.   She stated the stress has caused her to oversleep and she has problems with attention and concentration and was making multiple mistakes at work. She is considering either STD to LTD or switching to part time employment but she needs medical benefits due to her multiple health issues. She continues to meet with her counselor on a regular basis. She will be starting infusions once every 2 week for 6 weeks then once a month, and medication is called Benlysta. She stated she will like to switch her Clonazepam to Lorazepam because she needs a medication she can use as needed but that also acts fast when she experiences episodes of panic. She is reporting raising thoughts and insomnia but denies manic other symptoms. She agrees to try Zolpidem 10 mg po qhs.  Will schedule follow up in 6 weeks. HPI     Past Medical History:   Diagnosis Date   • Abdominal pain    • Anaphylaxis    • Anxiety    • Anxiety and depression    • COVID-19 12/2020   • Delayed emergence from anesthesia 03/23/2023    Severe episode of emergence delirium (confused, combative) after MAC anesthetic on 03/2023 for EGD. Received versed 2mg, >50mcg precedex, 5mg IV haldol, and 50mcg fentanyl. Waxing and waning episodes of agitation. Any future anesthetics should NOT be done at [facility]. • Delirium, induced by drug     anesthesia   • Depression    • Diabetes mellitus ([address])    • Disease of thyroid gland     Hashimoto   • DKA, type 1 ([address]) 05/11/2021   • Eating disorder     history of anorexia/bulemia 9465-8638   • Food intolerance    • Fracture of fibula     R Salter I   • Darnella Brigido disease    • Hashimoto's disease 02/21/2020   • Head injury    • Headache(784.0)    • Nasal congestion    • PTSD (post-traumatic stress disorder)    • Rectal bleed    • Seizures (HCC)    • Type 1 diabetes ([address])        Past Surgical History:   Procedure Laterality Date   • COLONOSCOPY     • KNEE SURGERY Right 07/06/2020   • NASAL SEPTOPLASTY W/ TURBINOPLASTY     • SINUS SURGERY     • SKIN BIOPSY     • TURBINOPLASTY N/A 3/22/2021    Procedure: Vasile Rincon;  Surgeon: Maliha Nathan MD;  Location: [facility];  Service: ENT   • UPPER GASTROINTESTINAL ENDOSCOPY     • WISDOM TOOTH EXTRACTION     • WRIST SURGERY      left;  Excision of ganglion       Current Outpatient Medications   Medication Sig Dispense Refill   • LORazepam (Ativan) 0.5 mg tablet Take 1 tablet (0.5 mg total) by mouth 2 (two) times a day as needed for anxiety 60 tablet 0   • zolpidem (AMBIEN) 10 mg tablet Take 1 tablet (10 mg total) by mouth daily at bedtime as needed for sleep 30 tablet 0   • Azelastine HCl 137 MCG/SPRAY SOLN 2 SPRAYS INTO EACH NOSTRIL 2 (TWO) TIMES A DAY AS NEEDED FOR RHINITIS USE IN EACH NOSTRIL AS DIRECTED 90 mL 0   • BD Insulin Syringe U/F 31G X 5/16" 0.5 ML MISC Use as directly with weekly methotrexate injection. 759 each 0   • folic acid (FOLVITE) 1 mg tablet Take 1 tablet (1 mg total) by mouth daily 90 tablet 3   • gabapentin (Neurontin) 300 mg capsule Take 1 capsule (300 mg total) by mouth daily at bedtime PRN 90 capsule 3   • glucagon 1 MG injection 1 mg     • Glucagon HCl (Glucagon Emergency) 1 MG/ML SOLR INJECT 1 MG AS DIRECTED AS NEEDED (WHEN PASSED OUT FROM LOW BLOOD SUGAR). • hydroxychloroquine (PLAQUENIL) 200 mg tablet Take 1 tablet (200 mg total) by mouth 2 (two) times a day with meals 180 tablet 3   • Insulin Disposable Pump (Omnipod 5 G6 Pod, Gen 5,) MISC INJECT 1 EACH UNDER THE SKIN EVERY THIRD DAY. E10.65     • Insulin Pen Needle (BD PEN NEEDLE NASIM U/F) 32G X 4 MM MISC by Does not apply route 4 (four) times a day for 180 days 400 each 3   • levonorgestrel-ethinyl estradiol (Altavera) 0.15-30 MG-MCG per tablet Take 1 tablet by mouth daily 84 tablet 3   • levothyroxine 25 mcg tablet Take 1 tablet (25 mcg total) by mouth daily 60 tablet 0   • methotrexate 50 MG/2ML injection Inject 0.8 mL (20 mg total) under the skin once a week 4 mL 5   • NovoLOG 100 UNIT/ML injection INJECT 3 TIMES A DAY WITH MEALS BASED ON ICR 4 AND ISF 30 FOR TDD 90 UNITS DAILY VIA INSULIN PUMP     • OneTouch Verio test strip USE 4 TIMES A DAY BEFORE MEALS AND AT BEDTIME     • pancrelipase, Lip-Prot-Amyl, (CREON) 12,000 units capsule Take 36,000 units of lipase by mouth 3 (three) times a day with meals Take 3 capsules prior to each meal and 1 prior to snack 120 capsule 3   • promethazine (PHENERGAN) 12.5 MG tablet Take 1 tablet (12.5 mg total) by mouth every 6 (six) hours as needed for nausea or vomiting 30 tablet 0   • Tresiba FlexTouch 100 units/mL injection pen INJECT 34 UNITS DAILY IN THE EVENT OF PUMP FAILURE E10.65       No current facility-administered medications for this visit.         Allergies   Allergen Reactions   • Insulin Glargine Other (See Comments) LANTUS BRAND -Burning and redness under skin        Review of Systems      Mood Anxiety, Depression and Emotional Lability   Behavior Impulsive Behavior   Thought Content Disturbing Thoughts, Feelings   General Emotional Problems and Decreased Functioning   Personality Normal   Other Psych Symptoms Normal   Constitutional Negative   ENT Negative   Cardiovascular Negative   Respiratory Negative   Gastrointestinal Negative   Genitourinary Negative   Musculoskeletal Negative   Integumentary Negative   Neurological Negative   Endocrine Normal    Other Symptoms Normal        Laboratory Results:   Recent Labs (last 12 months):   Appointment on 09/11/2023   Component Date Value   • QFT Nil 09/11/2023 0.04    • QFT TB1-NIL 09/11/2023 0.00    • QFT TB2-NIL 09/11/2023 0.00    • QFT Mitogen-NIL 09/11/2023 >10.00    • QFT Final Interpretation 09/11/2023 Negative    Admission on 09/05/2023, Discharged on 09/06/2023   Component Date Value   • POC Glucose 09/05/2023 179 (H)    • Ventricular Rate 09/05/2023 78    • Atrial Rate 09/05/2023 78    • MI Interval 09/05/2023 132    • QRSD Interval 09/05/2023 82    • QT Interval 09/05/2023 352    • QTC Interval 09/05/2023 401    • P Axis 09/05/2023 73    • QRS Axis 09/05/2023 54    • T Wave Axis 09/05/2023 49    • EXT Preg Test, Ur 09/05/2023 Negative    • Control 09/05/2023 Valid    Appointment on 09/05/2023   Component Date Value   • WBC 09/05/2023 9.11    • RBC 09/05/2023 4.61    • Hemoglobin 09/05/2023 12.6    • Hematocrit 09/05/2023 41.2    • MCV 09/05/2023 89    • MCH 09/05/2023 27.3    • MCHC 09/05/2023 30.6 (L)    • RDW 09/05/2023 13.3    • MPV 09/05/2023 13.0 (H)    • Platelets 24/64/4206 222    • nRBC 09/05/2023 0    • Neutrophils Relative 09/05/2023 48    • Immat GRANS % 09/05/2023 0    • Lymphocytes Relative 09/05/2023 45 (H)    • Monocytes Relative 09/05/2023 7    • Eosinophils Relative 09/05/2023 0    • Basophils Relative 09/05/2023 0    • Neutrophils Absolute 09/05/2023 4.30 • Immature Grans Absolute 09/05/2023 0.02    • Lymphocytes Absolute 09/05/2023 4.12    • Monocytes Absolute 09/05/2023 0.61    • Eosinophils Absolute 09/05/2023 0.03    • Basophils Absolute 09/05/2023 0.03    • Sodium 09/05/2023 140    • Potassium 09/05/2023 3.6    • Chloride 09/05/2023 103    • CO2 09/05/2023 26    • ANION GAP 09/05/2023 11    • BUN 09/05/2023 12    • Creatinine 09/05/2023 0.81    • Glucose 09/05/2023 120    • Calcium 09/05/2023 9.6    • AST 09/05/2023 14    • ALT 09/05/2023 11    • Alkaline Phosphatase 09/05/2023 53    • Total Protein 09/05/2023 6.9    • Albumin 09/05/2023 4.2    • Total Bilirubin 09/05/2023 1.12 (H)    • eGFR 09/05/2023 100    • CRP 09/05/2023 <1.0    • Sed Rate 09/05/2023 6    • C4, COMPLEMENT 09/05/2023 16 (L)    • C3 Complement 09/05/2023 124    • ds DNA Ab 09/05/2023 <1    • STREP PNEUMO TYPE1 09/05/2023 3.1    • Pneumococcal antibody Ty* 09/05/2023 1.1 (L)    • Strep pneumo Type 4 09/05/2023 0.1 (L)    • Pneumococcal antibody Ty* 09/05/2023 21.5    • Strep pneumo Type 9 09/05/2023 20.4    • Strep pneumo Type 12 09/05/2023 1.4    • Strep pneumo Type 14 09/05/2023 1.1 (L)    • Pneumo Ab Type 17 (17F) 09/05/2023 1.3 (L)    • Strep pneumo Type 19 09/05/2023 1.7    • Pneumo Ab Type 2 09/05/2023 7.0    • Pneumo Ab Type 20 09/05/2023 3.7    • Pneumo Ab Type 22 (22F) 09/05/2023 1.8    • Pneumococcal antibody Ty* 09/05/2023 3.6    • Strep pneumo Type 26 09/05/2023 0.1 (L)    • Pneumo Ab Type 43 (11A) 09/05/2023 0.3 (L)    • Pneumo Ab Type 5 09/05/2023 0.4 (L)    • Strep pneumo Type 51 09/05/2023 0.1 (L)    • Pneumo Ab Type 54 (15B) 09/05/2023 1.4    • Strep pneumo Type 56 09/05/2023 0.1 (L)    • Strep pneumo Type 57 09/05/2023 1.3 (L)    • Strep pneumo Type 68 09/05/2023 2.4    • Pneumo Ab Type 70 (33F) 09/05/2023 0.7 (L)    • Pneumo Ab Type 34 (10A) 09/05/2023 0.3 (L)    Telephone on 08/11/2023   Component Date Value   • Right Eye Diabetic Retin* 07/25/2023 None    • Left Eye Diabetic Retino* 07/25/2023 None    Lab on 07/20/2023   Component Date Value   • WBC 07/20/2023 5.78    • RBC 07/20/2023 3.88    • Hemoglobin 07/20/2023 11.5    • Hematocrit 07/20/2023 35.7    • MCV 07/20/2023 92    • MCH 07/20/2023 29.6    • MCHC 07/20/2023 32.2    • RDW 07/20/2023 13.4    • MPV 07/20/2023 12.4    • Platelets 84/27/7097 220    • nRBC 07/20/2023 0    • Neutrophils Relative 07/20/2023 35 (L)    • Immat GRANS % 07/20/2023 0    • Lymphocytes Relative 07/20/2023 53 (H)    • Monocytes Relative 07/20/2023 9    • Eosinophils Relative 07/20/2023 2    • Basophils Relative 07/20/2023 1    • Neutrophils Absolute 07/20/2023 2.03    • Immature Grans Absolute 07/20/2023 0.02    • Lymphocytes Absolute 07/20/2023 3.06    • Monocytes Absolute 07/20/2023 0.52    • Eosinophils Absolute 07/20/2023 0.12    • Basophils Absolute 07/20/2023 0.03    • Sodium 07/20/2023 139    • Potassium 07/20/2023 3.6    • Chloride 07/20/2023 108    • CO2 07/20/2023 22    • ANION GAP 07/20/2023 9    • BUN 07/20/2023 9    • Creatinine 07/20/2023 0.94    • Glucose, Fasting 07/20/2023 133 (H)    • Calcium 07/20/2023 8.9    • AST 07/20/2023 8    • ALT 07/20/2023 15    • Alkaline Phosphatase 07/20/2023 49    • Total Protein 07/20/2023 7.1    • Albumin 07/20/2023 3.8    • Total Bilirubin 07/20/2023 1.01 (H)    • eGFR 07/20/2023 83    • IgE 07/20/2023 25.1    • IGA 07/20/2023 247.0    • IGG 07/20/2023 855.0    • IGM 07/20/2023 138.0    • Diphtheria Ab 07/20/2023 0.17    • Tetanus Antitoxoid IgG Ab 07/20/2023 0.78    • STREP PNEUMO TYPE1 07/20/2023 2.8    • Pneumococcal antibody Ty* 07/20/2023 0.9 (L)    • Strep pneumo Type 4 07/20/2023 <0.1 (L)    • Pneumococcal antibody Ty* 07/20/2023 16.1    • Strep pneumo Type 9 07/20/2023 12.6    • Strep pneumo Type 12 07/20/2023 1.0 (L)    • Strep pneumo Type 14 07/20/2023 1.1 (L)    • Pneumo Ab Type 17 (17F) 07/20/2023 1.2 (L)    • Strep pneumo Type 19 07/20/2023 1.5    • Pneumo Ab Type 2 07/20/2023 5.7 • Pneumo Ab Type 20 07/20/2023 3.5    • Pneumo Ab Type 22 (22F) 07/20/2023 1.4    • Pneumococcal antibody Ty* 07/20/2023 3.1    • Strep pneumo Type 26 07/20/2023 <0.1 (L)    • Pneumo Ab Type 43 (11A) 07/20/2023 0.3 (L)    • Pneumo Ab Type 5 07/20/2023 0.3 (L)    • Strep pneumo Type 51 07/20/2023 0.1 (L)    • Pneumo Ab Type 54 (15B) 07/20/2023 1.2 (L)    • Strep pneumo Type 56 07/20/2023 0.1 (L)    • Strep pneumo Type 57 07/20/2023 1.1 (L)    • Strep pneumo Type 68 07/20/2023 2.1    • Pneumo Ab Type 70 (33F) 07/20/2023 0.7 (L)    • Pneumo Ab Type 34 (10A) 07/20/2023 0.2 (L)    Appointment on 06/05/2023   Component Date Value   • Hemoglobin A1C 06/05/2023 6.4 (H)    • EAG 06/05/2023 137    • TSH 3RD GENERATON 06/05/2023 1.617    Hospital Outpatient Visit on 03/23/2023   Component Date Value   • POC Glucose 03/23/2023 107    • EXT Preg Test, Ur 03/23/2023 Negative    • Control 03/23/2023 Valid    • Case Report 03/23/2023                      Value:Surgical Pathology Report                         Case: [id number]                                   Authorizing Provider:  Shameka Shah MD            Collected:           03/23/2023 1429              Ordering Location:     [address] [place] [address]        Received:            03/23/2023 [address] [place] [address] [facility]                                                    Pathologist:           Robert Avalos MD                                                          Specimens:   A) - Duodenum, cold bx-duodenum                                                                     B) - Stomach, antrum cold bx                                                                        C) - Stomach, body cold bx                                                                • Final Diagnosis 03/23/2023                      Value: This result contains rich text formatting which cannot be displayed here.    • Additional Information 03/23/2023 Value:This result contains rich text formatting which cannot be displayed here. • Gross Description 03/23/2023                      Value: This result contains rich text formatting which cannot be displayed here.    • Clinical Information 03/23/2023                      Value:History of intestinal metaplasia   • POC Glucose 03/23/2023 131    Office Visit on 03/02/2023   Component Date Value   • Color, UA 09/05/2023 Yellow    • Clarity, UA 09/05/2023 Turbid    • Specific Gravity, UA 09/05/2023 1.028    • pH, UA 09/05/2023 5.5    • Leukocytes, UA 09/05/2023 Trace (A)    • Nitrite, UA 09/05/2023 Negative    • Protein, UA 09/05/2023 30 (1+) (A)    • Glucose, UA 09/05/2023 Negative    • Ketones, UA 09/05/2023 Negative    • Urobilinogen, UA 09/05/2023 <2.0    • Bilirubin, UA 09/05/2023 Negative    • Occult Blood, UA 09/05/2023 Negative    • RBC, UA 09/05/2023 1-2    • WBC, UA 09/05/2023 4-10 (A)    • Epithelial Cells 09/05/2023 Occasional    • Bacteria, UA 09/05/2023 Occasional    • MUCUS THREADS 09/05/2023 Moderate (A)    • Ca Oxalate Sonia, UA 09/05/2023 Moderate (A)    • Creatinine, Ur 09/05/2023 239.6    • Protein Urine Random 09/05/2023 10    • Prot/Creat Ratio, Ur 09/05/2023 0.04    Appointment on 03/01/2023   Component Date Value   • Pancreatic Elastase-1 03/01/2023 135 (L)    • H pylori Ag, Stl 03/01/2023 Negative    Appointment on 02/27/2023   Component Date Value   • CRP 02/27/2023 <3.0    • Sed Rate 02/27/2023 12    • C4, COMPLEMENT 02/27/2023 20.0    • C3 Complement 02/27/2023 109.0    • ds DNA Ab 02/27/2023 <1    • WBC 02/27/2023 5.50    • RBC 02/27/2023 4.44    • Hemoglobin 02/27/2023 13.4    • Hematocrit 02/27/2023 40.1    • MCV 02/27/2023 90    • MCH 02/27/2023 30.2    • MCHC 02/27/2023 33.4    • RDW 02/27/2023 13.1    • MPV 02/27/2023 12.6    • Platelets 20/37/9381 222    • nRBC 02/27/2023 0    • Neutrophils Relative 02/27/2023 44    • Immat GRANS % 02/27/2023 0    • Lymphocytes Relative 02/27/2023 44 • Monocytes Relative 02/27/2023 9    • Eosinophils Relative 02/27/2023 2    • Basophils Relative 02/27/2023 1    • Neutrophils Absolute 02/27/2023 2.49    • Immature Grans Absolute 02/27/2023 0.01    • Lymphocytes Absolute 02/27/2023 2.41    • Monocytes Absolute 02/27/2023 0.48    • Eosinophils Absolute 02/27/2023 0.08    • Basophils Absolute 02/27/2023 0.03    • Sodium 02/27/2023 137    • Potassium 02/27/2023 3.8    • Chloride 02/27/2023 108    • CO2 02/27/2023 25    • ANION GAP 02/27/2023 4    • BUN 02/27/2023 13    • Creatinine 02/27/2023 0.94    • Glucose 02/27/2023 84    • Calcium 02/27/2023 9.8    • AST 02/27/2023 18    • ALT 02/27/2023 16    • Alkaline Phosphatase 02/27/2023 50    • Total Protein 02/27/2023 8.0    • Albumin 02/27/2023 4.4    • Total Bilirubin 02/27/2023 0.84    • eGFR 02/27/2023 83    There may be more visits with results that are not included.        Substance Abuse History:  Social History     Substance and Sexual Activity   Drug Use Never       Family Psychiatric History:   Family History   Problem Relation Age of Onset   • Hypertension Mother    • Migraines Mother         Headache   • Diabetes type II Mother    • Varicose Veins Mother    • Hyperlipidemia Mother    • Diabetes Mother    • Arthritis Mother    • Depression Mother    • Hearing loss Mother    • Anxiety disorder Mother    • Eczema Father    • Cholelithiasis Father    • Hypertension Father    • Sarcoidosis Father         Liver   • Hyperlipidemia Father    • Diabetes Father    • Coronary artery disease Father    • Nephrolithiasis Father    • Cirrhosis Father    • Alcohol abuse Father    • Thyroid disease Sister    • Hashimoto's thyroiditis Sister    • Alcohol abuse Brother    • Cancer Family    • Diabetes Family    • Hypertension Family        The following portions of the patient's history were reviewed and updated as appropriate: allergies, current medications, past family history, past medical history, past social history, past surgical history and problem list.    Social History     Socioeconomic History   • Marital status: /Civil Union     Spouse name: Not on file   • Number of children: 0   • Years of education: Not on file   • Highest education level: Associate degree: academic program   Occupational History   • Occupation: unemployed   Tobacco Use   • Smoking status: Never     Passive exposure: Never   • Smokeless tobacco: Never   • Tobacco comments:     Tobacco smoke exposure (Father smokes cigars)   Vaping Use   • Vaping Use: Never used   Substance and Sexual Activity   • Alcohol use: Not Currently   • Drug use: Never   • Sexual activity: Yes     Partners: Male     Birth control/protection: Condom Male, OCP   Other Topics Concern   • Not on file   Social History Narrative    Student at [employer] a poor diet; low in vegetables, high in sweets    Dental care, regularly    Lives with parents    Sleeps 8-10 hours a day        Do you have pets? Dog,cat Is pet allowed in bedroom? Yes    Are you a smoker? Never    Does anyone smoke in your home? No       Do you live with smokers? Yes    Travel [place] frequently? No   How many times a year?  N/A      Social Determinants of Health     Financial Resource Strain: Medium Risk (4/12/2021)    Overall Financial Resource Strain (CARDIA)    • Difficulty of Paying Living Expenses: Somewhat hard   Food Insecurity: Not on file   Transportation Needs: Not on file   Physical Activity: Insufficiently Active (8/7/2020)    Exercise Vital Sign    • Days of Exercise per Week: 7 days    • Minutes of Exercise per Session: 10 min   Stress: Stress Concern Present (8/7/2020)    [facility]    • Feeling of Stress : Rather much   Social Connections: Unknown (8/7/2020)    Social Connection and Isolation Panel [NHANES]    • Frequency of Communication with Friends and Family: More than three times a week    • Frequency of Social Gatherings with Friends and Family: Not on file    • Attends [employer] Services: Not on file    • Active Member of Clubs or Organizations: No    • Attends Club or Organization Meetings: Never    • Marital Status: Never    Intimate Partner Violence: Not At Risk (8/7/2020)    Humiliation, Afraid, Rape, and Kick questionnaire    • Fear of Current or Ex-Partner: No    • Emotionally Abused: No    • Physically Abused: No    • Sexually Abused: No   Housing Stability: Not on file     Social History     Social History Narrative    Student at [employer] a poor diet; low in vegetables, high in sweets    Dental care, regularly    Lives with parents    Sleeps 8-10 hours a day        Do you have pets? Dog,cat Is pet allowed in bedroom? Yes    Are you a smoker? Never    Does anyone smoke in your home? No       Do you live with smokers? Yes    Travel [employer] frequently? No   How many times a year?  N/A        Objective:       Mental status:  Appearance calm and cooperative , adequate hygiene and grooming and good eye contact    Mood dysphoric   Affect affect was constricted   Speech a normal rate and fluent   Thought Processes coherent/organized and normal thought processes   Hallucinations no hallucinations present    Thought Content no delusions   Abnormal Thoughts no suicidal thoughts  and no homicidal thoughts    Orientation  oriented to person and place and time   Remote Memory short term memory intact and long term memory intact   Attention Span concentration intact   Intellect Appears to be of Average Intelligence   Insight Limited insight   Judgement judgment was limited   Muscle Strength Muscle strength and tone were normal and Normal gait    Language no difficulty naming common objects and no difficulty repeating a phrase    Fund of Knowledge displays adequate knowledge of current events, adequate fund of knowledge regarding past history and adequate fund of knowledge regarding vocabulary                Assessment/Plan: Diagnoses and all orders for this visit:    Bipolar disorder, current episode mixed, moderate (HCC)  -     LORazepam (Ativan) 0.5 mg tablet; Take 1 tablet (0.5 mg total) by mouth 2 (two) times a day as needed for anxiety    Mixed obsessional thoughts and acts    Chronic post-traumatic stress disorder (PTSD)    Generalized anxiety disorder with panic attacks  -     LORazepam (Ativan) 0.5 mg tablet; Take 1 tablet (0.5 mg total) by mouth 2 (two) times a day as needed for anxiety    Psychophysiological insomnia  -     zolpidem (AMBIEN) 10 mg tablet; Take 1 tablet (10 mg total) by mouth daily at bedtime as needed for sleep            Treatment Recommendations- Risks Benefits      Immediate Medical/Psychiatric/Psychotherapy Treatments and Any Precautions: as stated in HPI    Risks, Benefits And Possible Side Effects Of Medications:  {PSYCH RISK, BENEFITS AND POSSIBLE SIDE EFFECTS (Optional):31457    Controlled Medication Discussion: Discussed with patient Black Box warning on concurrent use of benzodiazepines and opioid medications including sedation, respiratory depression, coma and death. Patient understands the risk of treatment with benzodiazepines in addition to opioids and wants to continue taking those medications. , Discussed with patient the risks of sedation, respiratory depression, impairment of ability to drive and potential for abuse and addiction related to treatment with benzodiazepine medications. The patient understands risk of treatment with benzodiazepine medications, agrees to not drive if feels impaired and agrees to take medications as prescribed. and The patient has been filling controlled prescriptions on time as prescribed to [address] [facility] [address] program.      Psychotherapy Provided: Individual psychotherapy provided. Individual psychotherapy provided: Yes  Counseling was provided during the session today for 16 minutes.   Medications, treatment progress and treatment plan reviewed with Jenny. Medication education provided to Jenny. Goals discussed during in session: improve control of mood stability. Recent stressor including  family problems, family conflict, family issues, school stress, health issues, medical problems, limited support, social difficulties, everyday stressors and ongoing anxiety discussed with Jass Corbett. Coping strategies including compliance with medications, contacting a therapist, deep/slow breathing, eliminating avoidance, engaging in previously avoided activities, exercising, getting into a good routine, improving self-esteem, increasing energy, increasing interest in usual activities, increasing motivation, increasing social interaction, keeping busy at home, maintain healthy diet, maintain heathy sleeping hygiene and maintain positive attitude reviewed with Jass Corbett. Importance of medication and treatment compliance reviewed with Jenny. Educated on importance of medication and treatment compliance. Importance of follow up with family physician for medical issues reviewed with Jenny.   Supportive therapy provided.                            Visit Time    Visit Start Time: 11:30  Visit Stop Time: 12:00  Total Visit Duration: 30 minutes

## 2023-09-22 ENCOUNTER — TELEPHONE (OUTPATIENT)
Dept: PSYCHIATRY | Facility: CLINIC | Age: 28
End: 2023-09-22

## 2023-09-22 NOTE — TELEPHONE ENCOUNTER
Writer HEMAL informing patient that today's appointment, 9/22, has been cancelled due to provider being out of office.  Notified her of next appointment on 9/29

## 2023-09-26 ENCOUNTER — HOSPITAL ENCOUNTER (OUTPATIENT)
Dept: INFUSION CENTER | Facility: CLINIC | Age: 28
End: 2023-09-26

## 2023-09-29 ENCOUNTER — SOCIAL WORK (OUTPATIENT)
Dept: BEHAVIORAL/MENTAL HEALTH CLINIC | Facility: CLINIC | Age: 28
End: 2023-09-29
Payer: COMMERCIAL

## 2023-09-29 DIAGNOSIS — F41.0 GENERALIZED ANXIETY DISORDER WITH PANIC ATTACKS: Primary | ICD-10-CM

## 2023-09-29 DIAGNOSIS — F43.12 CHRONIC POST-TRAUMATIC STRESS DISORDER (PTSD): ICD-10-CM

## 2023-09-29 DIAGNOSIS — F41.1 GENERALIZED ANXIETY DISORDER WITH PANIC ATTACKS: Primary | ICD-10-CM

## 2023-09-29 DIAGNOSIS — F31.62 BIPOLAR DISORDER, CURRENT EPISODE MIXED, MODERATE (HCC): ICD-10-CM

## 2023-09-29 PROCEDURE — 90834 PSYTX W PT 45 MINUTES: CPT | Performed by: SOCIAL WORKER

## 2023-09-29 NOTE — PSYCH
Behavioral Health Psychotherapy Progress Note    Psychotherapy Provided: Individual Psychotherapy     1. Generalized anxiety disorder with panic attacks        2. Chronic post-traumatic stress disorder (PTSD)        3. Bipolar disorder, current episode mixed, moderate ([address])            Goals addressed in session: Goal 1 and Goal 2     DATA:     Met with Jenny individually. Discussed ongoing flare up - will be starting infusions for Lupus. Very encouraging results. Codie Santos feels her MH symptoms are difficult to gauge as she is unsure which illness may be triggering the symptoms. Discussed boundary setting with parents regarding sister - specific circumstances discussed. Codie Santos booked her honeymoon - 1 week in [place] then 1 week on [employer] in Jan. Codie Santos is attending Maternal Fetal [facility] to discuss pre conception risks. Criss Brown and Codie Santos will continue couples counseling. Looking into other states to build a house in. Denied SI    During this session, this clinician used the following therapeutic modalities: Cognitive Processing Therapy, Dialectical Behavior Therapy and Supportive Psychotherapy    Substance Abuse was not addressed during this session. If the client is diagnosed with a co-occurring substance use disorder, please indicate any changes in the frequency or amount of use: . Stage of change for addressing substance use diagnoses: No substance use/Not applicable    ASSESSMENT:  Lenka Barnett presents with a Euthymic/ normal mood. her affect is Normal range and intensity, which is congruent, with her mood and the content of the session. The client has made progress on their goals. Lenka Barnett presents with a low risk of suicide, low risk of self-harm, and low risk of harm to others. For any risk assessment that surpasses a "low" rating, a safety plan must be developed.     A safety plan was indicated: no  If yes, describe in detail     PLAN: Between sessions, Codie Santos Ravi will attend couples session, attend Prenatal consult. At the next session, the therapist will use Cognitive Behavioral Therapy, Cognitive Processing Therapy, Dialectical Behavior Therapy and Supportive Psychotherapy to address  Health, mental health, marriage, infusions beginning. Behavioral Health Treatment Plan and Discharge Planning: Neelima Min is aware of and agrees to continue to work on their treatment plan. They have identified and are working toward their discharge goals.  yes    Visit start and stop times:    09/29/23  Start Time: 1400  Stop Time: 1450  Total Visit Time: 50 minutes

## 2023-10-03 ENCOUNTER — TELEPHONE (OUTPATIENT)
Dept: NEUROLOGY | Facility: CLINIC | Age: 28
End: 2023-10-03

## 2023-10-03 NOTE — TELEPHONE ENCOUNTER
DIEGO 10/2/23 at 12:54 pm:    Patient called into report an increase in symptoms. Tremors getting worse, an increase in dizziness.    Please call [phone]    LOV 2/8/23

## 2023-10-04 ENCOUNTER — OFFICE VISIT (OUTPATIENT)
Dept: NEUROLOGY | Facility: CLINIC | Age: 28
End: 2023-10-04
Payer: COMMERCIAL

## 2023-10-04 VITALS
HEART RATE: 96 BPM | SYSTOLIC BLOOD PRESSURE: 100 MMHG | BODY MASS INDEX: 24.77 KG/M2 | DIASTOLIC BLOOD PRESSURE: 82 MMHG | WEIGHT: 134.6 LBS | OXYGEN SATURATION: 98 % | HEIGHT: 62 IN | TEMPERATURE: 97.6 F

## 2023-10-04 DIAGNOSIS — R76.8 RHEUMATOID FACTOR POSITIVE: ICD-10-CM

## 2023-10-04 DIAGNOSIS — G81.94 LEFT HEMIPARESIS (HCC): ICD-10-CM

## 2023-10-04 DIAGNOSIS — G45.9 TIA (TRANSIENT ISCHEMIC ATTACK): Primary | ICD-10-CM

## 2023-10-04 DIAGNOSIS — G44.86 CERVICOGENIC HEADACHE: ICD-10-CM

## 2023-10-04 DIAGNOSIS — R76.8 POSITIVE ANA (ANTINUCLEAR ANTIBODY): ICD-10-CM

## 2023-10-04 PROCEDURE — 99215 OFFICE O/P EST HI 40 MIN: CPT | Performed by: PSYCHIATRY & NEUROLOGY

## 2023-10-04 RX ORDER — DEXAMETHASONE 2 MG/1
2 TABLET ORAL DAILY PRN
Qty: 5 TABLET | Refills: 2 | Status: SHIPPED | OUTPATIENT
Start: 2023-10-04

## 2023-10-04 RX ORDER — HYDROCODONE BITARTRATE AND ACETAMINOPHEN 5; 325 MG/1; MG/1
1 TABLET ORAL EVERY 6 HOURS PRN
COMMUNITY

## 2023-10-04 RX ORDER — AZITHROMYCIN 250 MG/1
TABLET, FILM COATED ORAL
COMMUNITY
Start: 2023-10-04

## 2023-10-04 NOTE — PROGRESS NOTES
Patient ID: Beth Gupta is a 34 y.o. female. Assessment/Plan:           Problem List Items Addressed This Visit        Other    Positive SOHAM (antinuclear antibody)    Relevant Medications    dexamethasone (DECADRON) 2 mg tablet    Rheumatoid factor positive    Relevant Medications    dexamethasone (DECADRON) 2 mg tablet   Other Visit Diagnoses     TIA (transient ischemic attack)    -  Primary    Relevant Medications    dexamethasone (DECADRON) 2 mg tablet    Other Relevant Orders    MRI brain w wo contrast    MRI angiogram head without contrast    Left hemiparesis (HCC)        Relevant Medications    dexamethasone (DECADRON) 2 mg tablet    Other Relevant Orders    MRI brain w wo contrast    MRI angiogram head without contrast    Cervicogenic headache        Relevant Medications    dexamethasone (DECADRON) 2 mg tablet         Mrs. Rodrigues has presented to [facility] for urgent evaluation of acutely developed left-sided weakness. Patient describes having sudden onset of disbalance which took place twice on October 1 with no loss of consciousness reported. Patient described herself took her 24 hours to have brain fog with consequent sharp pain in upper neck and shoulder with consequent left-sided hemiparesis. Patient describes she is feeling better right now. Considering significant remote history of comorbidities including type 1 diabetes with lupus as patient taking methotrexate and has been and lupus flare since last 3 months with limited response to prednisone, concern was for TIA/CVA/vasculitis related dissection. Patient has been taking methotrexate with Plaquenil with breakthrough disease has been described, patient completed prednisone. Patient had tooth extraction when infection been described in molar tooth.   I did bring concern for acutely developed neurological deficit she will require stat MRI brain with angiogram completed within 24 hours; patient was advised to start aspirin 162 mg till MRI is available for my review. Meanwhile, patient can use Decadron 2 mg with severe headache and dizziness. Patient does have right hand tremor, nonconcerning. Subjective:    HPI  Ms. Rodrigues has [facility] for follow-up on multiple neurological complaints. Today's complaints are:  dizziness started in Feb and March off and on but in the past month it seems to be happening more frenquently. Not room spinning dizziness but more like getting super lightheaded and about to fall over dizziness. Tremor on hand, noticed on right hand but concerned more with dizziness.      Says she had a really bad dizzy spell on Saturday which included pain in neck that shot down left side into her left arm which then got heavy and tingling. Then left foot went super numb with tingling sensation. Did not go to ED for evaluation. Was nervous they would discount her symptoms.     Also reports change in speech. Pt says she makes [employer] and has noticed change in speech, is different as if tongue doesn't know what to do, is just off. See therapist on weekly basis and therapost also says she does hear difference in speech.     Pt says she has procedure for tooth extraction. Patient presented with her  Jade Robert. Patient and her  described event where she suddenly started falling backwards with no involuntary upper or lower extremity movement or rolling her eyes up. Patient did not lose consciousness. Patient went to gym same day and within 30 minutes she developed second event of disbalance lasted for couple minutes. Patient stated she had brain fog for 24 hours after that patient described left neck pain which was abrupt and was shooting down to the left upper extremity which cause her heaviness and weakness in addition to weakness in the left foot and left lower extremity. Event took place in October 1.   Patient feeling better today as improvement noted within 24 hours. Patient stated she has dizziness in February. Patient has cognitive dysfunction. Patient has been taking Plaquenil for lupus as she states that she has flare of the lupus for the last 3 months and cannot get this under control. Patient is planning to transition from Plaquenil to [facility]. Patient had tooth extraction due to infection at that site. No focal weakness or facial asymmetry noted on today's exam.  Patient has stable signs of Hashimoto thyroiditis as patient taking levothyroxine 25 mcg.     Patient reports blurry vision intermittently but no double vision or vision loss described.     Patient has been more motivated with good movements and excellent energy today, we agreed patient would not require any additional neurological evaluation, we may consider repeating MRIs if patient described new focal neurological deficit. We also discussed patient has excellent outcomes of brain and thoracic spine imaging completed in June 2021, with MRI of cervical spine completed in February 2021. The following portions of the patient's history were reviewed and updated as appropriate:   She  has a past medical history of Abdominal pain, Anaphylaxis, Anxiety, Anxiety and depression, COVID-19 (12/2020), Delayed emergence from anesthesia (03/23/2023), Delirium, induced by drug, Depression, Diabetes mellitus ([address]), Disease of thyroid gland, DKA, type 1 ([address]) (05/11/2021), Eating disorder, Food intolerance, Fracture of fibula, Gilbert disease, Hashimoto's disease (02/21/2020), Head injury, Headache(784.0), Nasal congestion, PTSD (post-traumatic stress disorder), Rectal bleed, Seizures ([address]), and Type 1 diabetes ([address]).   She   Patient Active Problem List    Diagnosis Date Noted   • Other forms of systemic lupus erythematosus ([address]) 09/15/2023   • Hypotension 09/07/2023   • Pigmentation abnormality of skin 06/20/2023   • Hearing loss of right ear 06/08/2023   • Undifferentiated connective tissue disease ([address]) 06/08/2023   • Delayed emergence from anesthesia 03/23/2023   • Controlled diabetes mellitus type 1 with complications ([address]) 41/63/5265   • Bruising 10/19/2022   • Constipation 09/28/2022   • Gastroesophageal reflux disease 09/26/2022   • Other chest pain 02/22/2022   • Physical deconditioning 12/21/2021   • Right-sided back pain 12/21/2021   • Verruca 09/23/2021   • Nail abnormality 09/01/2021   • Primary hypothyroidism 08/18/2021   • Type 1 diabetes mellitus ([address]) 07/17/2021   • Type 1 diabetes mellitus with hyperglycemia ([address]) 07/17/2021   • Elevated prolactin level 07/14/2021   • Fatty stools 06/29/2021   • Left low back pain 06/29/2021   • Hyperlipidemia 05/19/2021   • Visual hallucinations 05/11/2021   • Rheumatoid factor positive 04/29/2021   • History of anesthesia complications 50/48/4445   • Seizures ([address])    • Positive SOHAM (antinuclear antibody) 03/09/2021   • Weakness generalized 02/11/2021   • Low serum vitamin B12 02/11/2021   • Arthralgia 02/11/2021   • Bipolar affective disorder ([address]) 01/13/2021   • 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   • Yeast infection 12/01/2020   • Syncope    • Chronic back pain 10/16/2020   • Insulin pump status 09/30/2020   • Muscle weakness 09/03/2020   • Word finding difficulty 09/03/2020   • Neuropathy 06/23/2020   • Polyarthritis 06/23/2020   • Secondary amenorrhea 06/23/2020   • Vitamin D deficiency 06/19/2020   • Paresthesia of left leg 04/21/2020   • Hashimoto's disease 02/21/2020   • Dysuria 02/21/2020   • Visual changes 01/21/2020   • Chronic pain of right knee 10/15/2019   • Chronic abdominal pain 10/15/2019   • Blood in the stool 10/15/2019   • OCD (obsessive compulsive disorder) 09/26/2019   • Activity intolerance 09/10/2019   • Chronic post-traumatic stress disorder (PTSD) 08/12/2019   • Generalized anxiety disorder with panic attacks 08/12/2019   • Nausea and vomiting 08/06/2019   • Agitation 08/06/2019   • Concussion without loss of consciousness 07/02/2019   • Urinary frequency 06/06/2019   • Hypoglycemia 06/05/2019   • Abnormal stools 02/07/2019   • Uncontrolled type 1 diabetes mellitus with hypoglycemia without coma ([address]) 01/29/2019   • Abnormal liver function test 12/13/2018   • Right sided abdominal pain 12/13/2018   • Chronic fatigue and malaise 12/13/2018   • Patellofemoral pain syndrome of right knee 09/26/2018     She  has a past surgical history that includes Wrist surgery; [place] tooth extraction; Nasal septoplasty w/ turbinoplasty; Knee surgery (Right, 07/06/2020); Sinus surgery; Turbinoplasty (N/A, 3/22/2021); Colonoscopy; Upper gastrointestinal endoscopy; and Skin biopsy. Her family history includes Alcohol abuse in her brother and father; Anxiety disorder in her mother; Arthritis in her mother; Cancer in her family; Cholelithiasis in her father; Cirrhosis in her father; Coronary artery disease in her father; Depression in her mother; Diabetes in her family, father, and mother; Diabetes type II in her mother; Eczema in her father; Hashimoto's thyroiditis in her sister; Hearing loss in her mother; Hyperlipidemia in her father and mother; Hypertension in her family, father, and mother; Migraines in her mother; Nephrolithiasis in her father; Sarcoidosis in her father; Thyroid disease in her sister; Varicose Veins in her mother. She  reports that she has never smoked. She has never been exposed to tobacco smoke. She has never used smokeless tobacco. She reports that she does not currently use alcohol. She reports that she does not use drugs. Current Outpatient Medications   Medication Sig Dispense Refill   • Azelastine HCl 137 MCG/SPRAY SOLN 2 SPRAYS INTO EACH NOSTRIL 2 (TWO) TIMES A DAY AS NEEDED FOR RHINITIS USE IN EACH NOSTRIL AS DIRECTED 90 mL 0   • azithromycin (ZITHROMAX) 250 mg tablet      • BD Insulin Syringe U/F 31G X 5/16" 0.5 ML MISC Use as directly with weekly methotrexate injection.  100 each 0   • dexamethasone (DECADRON) 2 mg tablet Take 1 tablet (2 mg total) by mouth daily as needed (headache) 5 tablet 2   • folic acid (FOLVITE) 1 mg tablet Take 1 tablet (1 mg total) by mouth daily 90 tablet 3   • gabapentin (Neurontin) 300 mg capsule Take 1 capsule (300 mg total) by mouth daily at bedtime PRN 90 capsule 3   • glucagon 1 MG injection 1 mg     • Glucagon HCl (Glucagon Emergency) 1 MG/ML SOLR INJECT 1 MG AS DIRECTED AS NEEDED (WHEN PASSED OUT FROM LOW BLOOD SUGAR).      • HYDROcodone-acetaminophen (NORCO) 5-325 mg per tablet Take 1 tablet by mouth every 6 (six) hours as needed for pain     • hydroxychloroquine (PLAQUENIL) 200 mg tablet Take 1 tablet (200 mg total) by mouth 2 (two) times a day with meals 180 tablet 3   • Insulin Disposable Pump (Omnipod 5 G6 Pod, Gen 5,) MISC INJECT 1 EACH UNDER THE SKIN EVERY THIRD DAY. E10.65     • levonorgestrel-ethinyl estradiol (Altavera) 0.15-30 MG-MCG per tablet Take 1 tablet by mouth daily 84 tablet 3   • levothyroxine 25 mcg tablet Take 1 tablet (25 mcg total) by mouth daily 60 tablet 0   • LORazepam (Ativan) 0.5 mg tablet Take 1 tablet (0.5 mg total) by mouth 2 (two) times a day as needed for anxiety 60 tablet 0   • methotrexate 50 MG/2ML injection Inject 0.8 mL (20 mg total) under the skin once a week 4 mL 5   • NovoLOG 100 UNIT/ML injection INJECT 3 TIMES A DAY WITH MEALS BASED ON ICR 4 AND ISF 30 FOR TDD 90 UNITS DAILY VIA INSULIN PUMP     • OneTouch Verio test strip USE 4 TIMES A DAY BEFORE MEALS AND AT BEDTIME     • pancrelipase, Lip-Prot-Amyl, (CREON) 12,000 units capsule Take 36,000 units of lipase by mouth 3 (three) times a day with meals Take 3 capsules prior to each meal and 1 prior to snack 120 capsule 3   • Tresiba FlexTouch 100 units/mL injection pen INJECT 34 UNITS DAILY IN THE EVENT OF PUMP FAILURE E10.65     • zolpidem (AMBIEN) 10 mg tablet Take 1 tablet (10 mg total) by mouth daily at bedtime as needed for sleep 30 tablet 0   • Insulin Pen Needle (BD PEN NEEDLE NASIM U/F) 32G X 4 MM MISC by Does not apply route 4 (four) times a day for 180 days 400 each 3   • promethazine (PHENERGAN) 12.5 MG tablet Take 1 tablet (12.5 mg total) by mouth every 6 (six) hours as needed for nausea or vomiting (Patient not taking: Reported on 10/4/2023) 30 tablet 0     No current facility-administered medications for this visit. Current Outpatient Medications on File Prior to Visit   Medication Sig   • Azelastine HCl 137 MCG/SPRAY SOLN 2 SPRAYS INTO EACH NOSTRIL 2 (TWO) TIMES A DAY AS NEEDED FOR RHINITIS USE IN EACH NOSTRIL AS DIRECTED   • azithromycin (ZITHROMAX) 250 mg tablet    • BD Insulin Syringe U/F 31G X 5/16" 0.5 ML MISC Use as directly with weekly methotrexate injection. • folic acid (FOLVITE) 1 mg tablet Take 1 tablet (1 mg total) by mouth daily   • gabapentin (Neurontin) 300 mg capsule Take 1 capsule (300 mg total) by mouth daily at bedtime PRN   • glucagon 1 MG injection 1 mg   • Glucagon HCl (Glucagon Emergency) 1 MG/ML SOLR INJECT 1 MG AS DIRECTED AS NEEDED (WHEN PASSED OUT FROM LOW BLOOD SUGAR).    • HYDROcodone-acetaminophen (NORCO) 5-325 mg per tablet Take 1 tablet by mouth every 6 (six) hours as needed for pain   • hydroxychloroquine (PLAQUENIL) 200 mg tablet Take 1 tablet (200 mg total) by mouth 2 (two) times a day with meals   • Insulin Disposable Pump (Omnipod 5 G6 Pod, Gen 5,) MISC INJECT 1 EACH UNDER THE SKIN EVERY THIRD DAY. E10.65   • levonorgestrel-ethinyl estradiol (Altavera) 0.15-30 MG-MCG per tablet Take 1 tablet by mouth daily   • levothyroxine 25 mcg tablet Take 1 tablet (25 mcg total) by mouth daily   • LORazepam (Ativan) 0.5 mg tablet Take 1 tablet (0.5 mg total) by mouth 2 (two) times a day as needed for anxiety   • methotrexate 50 MG/2ML injection Inject 0.8 mL (20 mg total) under the skin once a week   • NovoLOG 100 UNIT/ML injection INJECT 3 TIMES A DAY WITH MEALS BASED ON ICR 4 AND ISF 30 FOR TDD 90 UNITS DAILY VIA INSULIN PUMP   • OneTouch Verio test strip USE 4 TIMES A DAY BEFORE MEALS AND AT BEDTIME   • pancrelipase, Lip-Prot-Amyl, (CREON) 12,000 units capsule Take 36,000 units of lipase by mouth 3 (three) times a day with meals Take 3 capsules prior to each meal and 1 prior to snack   • Tresiba FlexTouch 100 units/mL injection pen INJECT 34 UNITS DAILY IN THE EVENT OF PUMP FAILURE E10.65   • zolpidem (AMBIEN) 10 mg tablet Take 1 tablet (10 mg total) by mouth daily at bedtime as needed for sleep   • Insulin Pen Needle (BD PEN NEEDLE NASIM U/F) 32G X 4 MM MISC by Does not apply route 4 (four) times a day for 180 days   • promethazine (PHENERGAN) 12.5 MG tablet Take 1 tablet (12.5 mg total) by mouth every 6 (six) hours as needed for nausea or vomiting (Patient not taking: Reported on 10/4/2023)     No current facility-administered medications on file prior to visit. She is allergic to insulin glargine. .         Objective:    Blood pressure 100/82, pulse 96, temperature 97.6 °F (36.4 °C), temperature source Temporal, height 5' 2" (1.575 m), weight 61.1 kg (134 lb 9.6 oz), last menstrual period 08/15/2023, SpO2 98 %, not currently breastfeeding. Physical Exam    Neurological Exam    CONSTITUTIONAL: NAD, pleasant. NECK: supple, no lymphadenopathy, no thyromegaly, no JVD. CARDIOVASCULAR: RRR, normal S1S2, no murmurs, no rubs. RESP: clear to auscultation bilaterally, no wheezes/rhonchi/rales. ABDOMEN: soft, non tender, non distended. SKIN: no rash or skin lesions. EXTREMITIES: no edema, pulses 2+bilaterally. PSYCH: appropriate mood and affect  NEUROLOGIC COMPREHENSIVE EXAM: Patient is oriented to person, place and time, NAD; appropriate affect. CN II, III, IV, V, VI, VII,VIII,IX,X,XI-XII intact with EOMI, PERRLA, OKN intact, VF grossly intact, fundi poorly visualized secondary to pupillary constriction; symmetric face noted. Motor: 5/5 UE/LE bilateral symmetric; Sensory: intact to light touch and pinprick bilaterally; normal vibration sensation feet bilaterally;  Coordination within normal limits on FTN and [place] testing; DTR: 2/4 through, no Babinski, no clonus. Tandem gait is intact. Romberg: absent. ROS:    Review of Systems   Constitutional: Negative for appetite change, fatigue and fever. HENT: Negative. Negative for hearing loss, tinnitus, trouble swallowing and voice change. Eyes: Negative. Negative for photophobia, pain and visual disturbance. Respiratory: Negative. Negative for shortness of breath. Cardiovascular: Negative. Negative for palpitations. Gastrointestinal: Negative. Negative for nausea and vomiting. Endocrine: Negative. Negative for cold intolerance. Genitourinary: Negative. Negative for dysuria, frequency and urgency. Musculoskeletal: Positive for neck pain (LT side). Negative for back pain, gait problem and myalgias. Skin: Negative. Negative for rash. Allergic/Immunologic: Negative. Neurological: Positive for dizziness, speech difficulty, light-headedness, numbness (tingling LT foot) and headaches. Negative for tremors, seizures, syncope, facial asymmetry and weakness. Memory loss   Hematological: Negative. Does not bruise/bleed easily. Psychiatric/Behavioral: Positive for confusion and sleep disturbance. Negative for hallucinations. The patient is nervous/anxious.

## 2023-10-04 NOTE — TELEPHONE ENCOUNTER
Spoke with pt and she states that dizziness started in Feb and March off and on but in the past month it seems to be happening more frenquently. Not room spinning dizziness but more like getting super lightheaded and about to fall over dizziness. Tremor on hand, noticed on right hand but concerned more with dizziness. Says she had a really bad dizzy spell on Saturday which included pain in neck that shot down left side into her left arm which then got heavy and tingling. Then left foot went super numb with tingling sensation. Did not go to ED for evaluation. Was nervous they would discount her symptoms. Also reports change in speech. Pt says she makes [employer] and has noticed change in speech, is different as if tongue doesn't know what to do, is just off. See therapist on weekly basis and therapost also says she does hear difference in speech. Pt says she has procedure for tooth extraction today at 10 AM and will be sedated but if Dr. Franklin Wynn would like her to be seen today, she should be recovered by later today. Dr. Franklin Wynn - Please advise. There are available appt slots today for 3:30 and 5 PM.    Best cn [phone], ok to leave detailed message.

## 2023-10-05 ENCOUNTER — TELEPHONE (OUTPATIENT)
Dept: NEPHROLOGY | Facility: CLINIC | Age: 28
End: 2023-10-05

## 2023-10-05 ENCOUNTER — HOSPITAL ENCOUNTER (OUTPATIENT)
Dept: MRI IMAGING | Facility: HOSPITAL | Age: 28
Discharge: HOME | End: 2023-10-05
Attending: PSYCHIATRY & NEUROLOGY
Payer: COMMERCIAL

## 2023-10-05 ENCOUNTER — SOCIAL WORK (OUTPATIENT)
Dept: BEHAVIORAL/MENTAL HEALTH CLINIC | Facility: CLINIC | Age: 28
End: 2023-10-05
Payer: COMMERCIAL

## 2023-10-05 DIAGNOSIS — G45.9 TIA (TRANSIENT ISCHEMIC ATTACK): ICD-10-CM

## 2023-10-05 DIAGNOSIS — G81.94 LEFT HEMIPARESIS (HCC): ICD-10-CM

## 2023-10-05 DIAGNOSIS — F41.1 GENERALIZED ANXIETY DISORDER WITH PANIC ATTACKS: ICD-10-CM

## 2023-10-05 DIAGNOSIS — F43.12 CHRONIC POST-TRAUMATIC STRESS DISORDER (PTSD): Primary | ICD-10-CM

## 2023-10-05 DIAGNOSIS — F41.0 GENERALIZED ANXIETY DISORDER WITH PANIC ATTACKS: ICD-10-CM

## 2023-10-05 PROCEDURE — G1004 CDSM NDSC: HCPCS

## 2023-10-05 PROCEDURE — 70544 MR ANGIOGRAPHY HEAD W/O DYE: CPT

## 2023-10-05 PROCEDURE — A9585 GADOBUTROL INJECTION: HCPCS | Performed by: PSYCHIATRY & NEUROLOGY

## 2023-10-05 PROCEDURE — 90847 FAMILY PSYTX W/PT 50 MIN: CPT | Performed by: COUNSELOR

## 2023-10-05 PROCEDURE — 70553 MRI BRAIN STEM W/O & W/DYE: CPT

## 2023-10-05 RX ORDER — GADOBUTROL 604.72 MG/ML
6 INJECTION INTRAVENOUS
Status: COMPLETED | OUTPATIENT
Start: 2023-10-05 | End: 2023-10-05

## 2023-10-05 RX ADMIN — GADOBUTROL 6 ML: 604.72 INJECTION INTRAVENOUS at 22:17
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
## 2023-10-05 NOTE — BH TREATMENT PLAN
[facility] [address]  1995     Date of Initial Psychotherapy Assessment: 09/14/2023  Date of Current Treatment Plan: 10/05/23  Treatment Plan Target Date: TBD  Treatment Plan Expiration Date: 04/01/2024     Diagnosis:   1. Chronic post-traumatic stress disorder (PTSD)        2. Generalized anxiety disorder with panic attacks            Area(s) of Need: "The communication in the relationship."     Long Term Goal 1 (in the client's own words): "To communicate more frequently and honestly."    Stage of Change: Preparation    Target Date for completion: TBD     Anticipated therapeutic modalities: Cognitive Behavioral Therapy, Supportive Therapy, Solution-Focused Therapy      People identified to complete this goal: Cirilo Munoz and Kym Hale      Objective 1: (identify the means of measuring success in meeting the objective): Attend all scheduled marriage counseling sessions       Objective 2: (identify the means of measuring success in meeting the objective): Learn 3 ways to communicate verbally when angry      Objective 3: (identify the means of measuring success in meeting the objective): Be respectful when communicating with each other not talking to each other like children      Objective 4: (identify the means of measuring success in meeting the objective): Be able to express uncomfortable feelings without yelling at each other      Objective 5: (identify the means of measuring success in meeting the objective):  Increase the frequency of the direct expression of honest, respectful and positive feelings and thoughts within the relationship        Objective 6: (identify the means of measuring success in meeting the objective): Develop and implement a "time out" signal that either partner may give to stop interaction that may escalate into further arguments              I am currently under the care of a [facility] psychiatric provider: yes Cirilo Munoz     My [facility] psychiatric provider is: Marline Dennis MD    I am currently taking psychiatric medications: Yes, as prescribed    I feel that I will be ready for discharge from mental health care when I reach the following (measurable goal/objective): "We don't know."     For children and adults who have a legal guardian:   Has there been any change to custody orders and/or guardianship status? NA. If yes, attach updated documentation. Will be created next session my Crisis Plan and have been offered a copy of this plan    [address]: Diagnosis and Treatment Plan explained to Estuardo Damienny acknowledges an understanding of their diagnosis. Haleigh Frey agrees to this treatment plan.     I have been offered a copy of this Treatment Plan. yes

## 2023-10-06 ENCOUNTER — CONSULT (OUTPATIENT)
Dept: PERINATAL CARE | Facility: CLINIC | Age: 28
End: 2023-10-06
Payer: COMMERCIAL

## 2023-10-06 VITALS
BODY MASS INDEX: 24.33 KG/M2 | HEART RATE: 86 BPM | DIASTOLIC BLOOD PRESSURE: 60 MMHG | SYSTOLIC BLOOD PRESSURE: 98 MMHG | WEIGHT: 132.2 LBS | HEIGHT: 62 IN

## 2023-10-06 DIAGNOSIS — M32.8 OTHER FORMS OF SYSTEMIC LUPUS ERYTHEMATOSUS, UNSPECIFIED ORGAN INVOLVEMENT STATUS (HCC): Primary | ICD-10-CM

## 2023-10-06 DIAGNOSIS — R76.8 RHEUMATOID FACTOR POSITIVE: ICD-10-CM

## 2023-10-06 DIAGNOSIS — F41.0 GENERALIZED ANXIETY DISORDER WITH PANIC ATTACKS: ICD-10-CM

## 2023-10-06 DIAGNOSIS — E10.8 CONTROLLED DIABETES MELLITUS TYPE 1 WITH COMPLICATIONS (HCC): ICD-10-CM

## 2023-10-06 DIAGNOSIS — E10.69 TYPE 1 DIABETES MELLITUS WITH OTHER SPECIFIED COMPLICATION (HCC): ICD-10-CM

## 2023-10-06 DIAGNOSIS — F41.1 GENERALIZED ANXIETY DISORDER WITH PANIC ATTACKS: ICD-10-CM

## 2023-10-06 DIAGNOSIS — E06.3 HASHIMOTO'S DISEASE: ICD-10-CM

## 2023-10-06 DIAGNOSIS — Z31.69 PRE-CONCEPTION COUNSELING: ICD-10-CM

## 2023-10-06 PROBLEM — E16.2 HYPOGLYCEMIA: Status: RESOLVED | Noted: 2019-06-05 | Resolved: 2023-10-06

## 2023-10-06 PROCEDURE — 99245 OFF/OP CONSLTJ NEW/EST HI 55: CPT | Performed by: OBSTETRICS & GYNECOLOGY

## 2023-10-06 NOTE — ASSESSMENT & PLAN NOTE
She currently is treated with Lorazepam PRN (takes a few times a week) for panic attacks, as well as Ambien at bedtime. Ambien is not known to be teratogenic, but should be used with caution in the third trimester due to risk of  respiratory depression and low birth weight. Benzodiazepines including Lorazepam (Ativan) are generally advised against during pregnancy due to possible risk of teratogenicity, risk of sedation,  respiratory depression, hypotonia, and withdrawal symptoms. Some animal studies have suggested adverse neurodevelopmental outcomes in animal studies with benzodiazepines. I discussed these risks of benzodiazepines and advised that she work closely with her psychiatrist and therapist to evaluate whether there are therapeutic alternatives with more favorable risk profiles in pregnancy. We reviewed that decisions about treatment of maternal mental illness need to be individualized, and that there are also risks of untreated mental illness in pregnancy. We reviewed potential risks of abruptly stopping benzodiazepines, including seizures.

## 2023-10-06 NOTE — PROGRESS NOTES
CONSULTATION: MATERNAL-FETAL MEDICINE    Dear Sudhir Powers, [facility],  [address],    Thank you very much for your kind referral of patient Bethany Estevez for Maternal-Fetal Medicine consultation. She presents with her partner Bonita Otoole. As you know, Ms. Cintia Gordon is a 34 y.o. [id number] presenting for pre-conception consultation. She has a complex medical history, with active problem list below. Today's consultation focused on the followin. Type 1 Diabetes  2. Systemic Lupus Erythematosus  3. Depression, Anxiety and PTSD  4.  Hypothyroidism    Patient Active Problem List   Diagnosis   • Patellofemoral pain syndrome of right knee   • Abnormal liver function test   • Right sided abdominal pain   • Chronic fatigue and malaise   • Uncontrolled type 1 diabetes mellitus with hypoglycemia without coma (HCC)   • Abnormal stools   • Urinary frequency   • Concussion without loss of consciousness   • Nausea and vomiting   • Agitation   • Chronic post-traumatic stress disorder (PTSD)   • Generalized anxiety disorder with panic attacks   • Activity intolerance   • OCD (obsessive compulsive disorder)   • Chronic pain of right knee   • Chronic abdominal pain   • Blood in the stool   • Visual changes   • Hashimoto's disease   • Dysuria   • Paresthesia of left leg   • Vitamin D deficiency   • Neuropathy   • Polyarthritis   • Muscle weakness   • Word finding difficulty   • Insulin pump status   • Secondary amenorrhea   • Chronic back pain   • Syncope   • Vulvodynia   • Yeast infection   • Bipolar affective disorder (HCC)   • Weakness generalized   • Low serum vitamin B12   • Arthralgia   • Positive SOHAM (antinuclear antibody)   • Seizures ([facility])   • History of anesthesia complications   • Rheumatoid factor positive   • Hyperlipidemia   • Fatty stools   • Left low back pain   • Visual hallucinations   • Elevated prolactin level   • Type 1 diabetes mellitus (HCC)   • Nail abnormality   • Verruca   • Primary hypothyroidism   • Physical deconditioning   • Right-sided back pain   • Other chest pain   • Gastroesophageal reflux disease   • Constipation   • Bruising   • Type 1 diabetes mellitus with hyperglycemia (HCC)   • Controlled diabetes mellitus type 1 with complications (HCC)   • Delayed emergence from anesthesia   • Hearing loss of right ear   • Undifferentiated connective tissue disease (HCC)   • Pigmentation abnormality of skin   • Hypotension   • Other forms of systemic lupus erythematosus ([address])   • Pre-conception counseling       Obstetric History:  OB History    Para Term  AB Living   1 0 0 0 1     SAB IAB Ectopic Multiple Live Births   1 0 0          # Outcome Date GA Lbr Waldemar/2nd Weight Sex Delivery Anes PTL Lv   1 2018              Obstetric Comments   Menarche age 10       Past Medical History:  Past Medical History:   Diagnosis Date   • Abdominal pain    • Anaphylaxis    • Anxiety    • Anxiety and depression    • COVID-19 2020   • Delayed emergence from anesthesia 2023    Severe episode of emergence delirium (confused, combative) after MAC anesthetic on 2023 for EGD. Received versed 2mg, >50mcg precedex, 5mg IV haldol, and 50mcg fentanyl. Waxing and waning episodes of agitation. Any future anesthetics should NOT be done at [facility].    • Delirium, induced by drug     anesthesia   • Depression    • Diabetes mellitus ([address])    • Disease of thyroid gland     Hashimoto   • DKA, type 1 ([address]) 2021   • Eating disorder     history of anorexia/bulemia [id number]   • Food intolerance    • Fracture of fibula     R Salter I   • Mamie Lisa disease    • Hashimoto's disease 2020   • Head injury    • Headache(784.0)    • Nasal congestion    • PTSD (post-traumatic stress disorder)    • Rectal bleed    • Seizures (HCC)    • Type 1 diabetes ([address])        Past Surgical History:  Past Surgical History:   Procedure Laterality Date   • COLONOSCOPY     • KNEE SURGERY Right 2020   • NASAL SEPTOPLASTY W/ TURBINOPLASTY     • SINUS SURGERY     • SKIN BIOPSY     • TURBINOPLASTY N/A 3/22/2021    Procedure: TURBINOPLASTY;  Surgeon: Mary Jane Montilla MD;  Location: [facility];  Service: ENT   • UPPER GASTROINTESTINAL ENDOSCOPY     • WISDOM TOOTH EXTRACTION     • WRIST SURGERY      left; Excision of ganglion       Social History:   She denies current use of alcohol, drugs of abuse, tobacco, and marijuana products. Occupation: [employer]. Josué Maki is currently on short term and long-term disability. Partner: Mary Arita, age 25. Goes by [employer]. Family History:  Family history was reviewed using an office screening tool, and is negative for congenital anomalies, genetic diseases, and thromboembolism in first degree relatives of this pregnancy. Family history is notable for diabetes on Jenny's side of the family. Medications:    Current Outpatient Medications:   •  Azelastine HCl 137 MCG/SPRAY SOLN, 2 SPRAYS INTO EACH NOSTRIL 2 (TWO) TIMES A DAY AS NEEDED FOR RHINITIS USE IN EACH NOSTRIL AS DIRECTED, Disp: 90 mL, Rfl: 0  •  azithromycin (ZITHROMAX) 250 mg tablet, , Disp: , Rfl:   •  BD Insulin Syringe U/F 31G X 5/16" 0.5 ML MISC, Use as directly with weekly methotrexate injection. , Disp: 100 each, Rfl: 0  •  dexamethasone (DECADRON) 2 mg tablet, Take 1 tablet (2 mg total) by mouth daily as needed (headache), Disp: 5 tablet, Rfl: 2  •  folic acid (FOLVITE) 1 mg tablet, Take 1 tablet (1 mg total) by mouth daily, Disp: 90 tablet, Rfl: 3  •  gabapentin (Neurontin) 300 mg capsule, Take 1 capsule (300 mg total) by mouth daily at bedtime PRN, Disp: 90 capsule, Rfl: 3  •  glucagon 1 MG injection, 1 mg, Disp: , Rfl:   •  Glucagon HCl (Glucagon Emergency) 1 MG/ML SOLR, INJECT 1 MG AS DIRECTED AS NEEDED (WHEN PASSED OUT FROM LOW BLOOD SUGAR). , Disp: , Rfl:   •  HYDROcodone-acetaminophen (NORCO) 5-325 mg per tablet, Take 1 tablet by mouth every 6 (six) hours as needed for pain, Disp: , Rfl:   • hydroxychloroquine (PLAQUENIL) 200 mg tablet, Take 1 tablet (200 mg total) by mouth 2 (two) times a day with meals, Disp: 180 tablet, Rfl: 3  •  Insulin Disposable Pump (Omnipod 5 G6 Pod, Gen 5,) MISC, INJECT 1 EACH UNDER THE SKIN EVERY THIRD DAY. E10.65, Disp: , Rfl:   •  Insulin Pen Needle (BD PEN NEEDLE NASIM U/F) 32G X 4 MM MISC, by Does not apply route 4 (four) times a day for 180 days, Disp: 400 each, Rfl: 3  •  levonorgestrel-ethinyl estradiol (Altavera) 0.15-30 MG-MCG per tablet, Take 1 tablet by mouth daily, Disp: 84 tablet, Rfl: 3  •  levothyroxine 25 mcg tablet, Take 1 tablet (25 mcg total) by mouth daily, Disp: 60 tablet, Rfl: 0  •  LORazepam (Ativan) 0.5 mg tablet, Take 1 tablet (0.5 mg total) by mouth 2 (two) times a day as needed for anxiety, Disp: 60 tablet, Rfl: 0  •  methotrexate 50 MG/2ML injection, Inject 0.8 mL (20 mg total) under the skin once a week, Disp: 4 mL, Rfl: 5  •  NovoLOG 100 UNIT/ML injection, INJECT 3 TIMES A DAY WITH MEALS BASED ON ICR 4 AND ISF 30 FOR TDD 90 UNITS DAILY VIA INSULIN PUMP, Disp: , Rfl:   •  OneTouch Verio test strip, USE 4 TIMES A DAY BEFORE MEALS AND AT BEDTIME, Disp: , Rfl:   •  pancrelipase, Lip-Prot-Amyl, (CREON) 12,000 units capsule, Take 36,000 units of lipase by mouth 3 (three) times a day with meals Take 3 capsules prior to each meal and 1 prior to snack, Disp: 120 capsule, Rfl: 3  •  promethazine (PHENERGAN) 12.5 MG tablet, Take 1 tablet (12.5 mg total) by mouth every 6 (six) hours as needed for nausea or vomiting (Patient not taking: Reported on 10/4/2023), Disp: 30 tablet, Rfl: 0  •  Tresiba FlexTouch 100 units/mL injection pen, INJECT 34 UNITS DAILY IN THE EVENT OF PUMP FAILURE E10.65, Disp: , Rfl:   •  zolpidem (AMBIEN) 10 mg tablet, Take 1 tablet (10 mg total) by mouth daily at bedtime as needed for sleep, Disp: 30 tablet, Rfl: 0  No current facility-administered medications for this visit. Allergies:   Allergies   Allergen Reactions   • Insulin Glargine Other (See Comments)     LANTUS BRAND -Burning and redness under skin        Review of Systems:  Review of Systems    Exam:  Physical Exam  Constitutional:       Appearance: Normal appearance. HENT:      Head: Normocephalic and atraumatic. Nose: No congestion. Pulmonary:      Effort: Pulmonary effort is normal.   Neurological:      General: No focal deficit present. Mental Status: She is alert and oriented to person, place, and time. Vitals: Blood pressure 98/60, pulse 86, height 5' 2" (1.575 m), weight 60 kg (132 lb 3.2 oz), not currently breastfeeding. My recommendations are as follows:     Type 1 diabetes mellitus ([address])  We discussed the current status of her Type 1 Diabetes. She is currently managed by Dr. Lionel Corral at [facility] for endocrinology and plans to continue care with him during pregnancy. She has a continuous glucose monitor and an OmniPod insulin pump. She describes her diabetes as brittle, and has multiple prior episodes of DKA. Her most recent Hemoglobin A1C was 6.4 on 6/5/23. We discussed that she can continue to utilize her OmniPod and CGM during pregnancy, but will also require accu-checks. We discussed the changing insulin requirements during pregnancy, as well as more stringent glycemic control required for normal fetal development. Generally, we advise an A1C of <6 pre-conception, though this may not be feasible if she is having episodes of hypoglycemia. She has a consultation with [address] [place] [address] next week to discuss management of diabetes in pregnancy in further detail. I advised her that we generally recommend that we provide comprehensive diabetes management during pregnancy. If she feels most comfortable following with her endocrinologist, we would at minimum request that she share glucose data with us from her CGM and glucometer in order to inform our recommendations regarding antepartum surveillance and delivery timing.       Pregnancies complicated by pre-gestational diabetes are associated with increased  and maternal morbidity. Many of the risks are correlated to glycemic control entering pregnancy (measured by Hemoglobin A1C), and affected by antepartum glycemic control. Fetal risks include macrosomia, shoulder dystocia, birth injuries, hypoglycemia, hyperbilirubinemia, and potential long-term sequelae such as obesity and impaired intellectual achievement. Maternal risks include preeclampsia and hypertensive disorders of pregnancy, operative delivery. Aggressive treatment of pregestational diabetes with diet, exercise, and medications including metformin and insulin have been shown to decrease fetal and  morbidity and mortality. We have a diabetes in pregnancy program where we follow our patients closely to help achieve optimal maternal and  outcomes. If her hemoglobin A1C is 6.5 or greater, an early anatomic survey for malformations is also recommended. Following a 20 week anatomic survey, fetal echocardiography is recommended at 22 weeks gestation, due an increased risk of congenital heart disease. Subsequently, evaluation of fetal growth every 4 weeks is recommended beginning at 28 weeks gestation. Antepartum fetal surveillance should be initiated twice weekly at 32 weeks gestation. Delivery timing should be individualized based on glycemic control. Patients who remain well controlled on medication should deliver between 39 0/7 and 39 6/7 weeks gestation, unless additional obstetric factors necessitate otherwise. Due to the risk of preeclampsia, I recommend low dose aspirin 162mg daily from 12 until 36 weeks gestation. Other forms of systemic lupus erythematosus ([address])  We discussed her Systemic Lupus Erythematosus (SLE). Her Rheumatologist is Dr. Javan Prince at [facility].  She is currently treated with Plaquenil (which is compatible with pregnancy), and methotrexate (which is absolutely contraindicated during pregnancy and breastfeeding). We discussed that based on the half life of methotrexate, it should be completely eliminated from the body within a month of stopping treatment. The  recommends waiting 3-6 months prior to conceiving. I advised that Rosalinda Brunson begin taking a prenatal vitamin at least a month prior to stopping her oral contraceptives. Dr. Amber Burk is preparing to start Benlysta in an attempt to wean Rosalinda Brunson off methotrexate. Benlysta (belimumab) is a monoclonal antibody that inhibits B-Lymphocyte stimulator. No human data is available regarding belimumab, there is no known risk of teratogenicity, but there is a risk of transient B-cell depletion based on animal data (source: charity: water, accessed 10/6/23). We reviewed her history of systemic lupus erythematosus (SLE) which is sub-optimally controlled at this time. Her primary symptoms are joint pain, swelling, and rashes. Her creatinine is fortunately normal (0.73 on 23), and she has no history of renal involvement. She is followed by Dr. Amber Burk from Rheumatology. We discussed that autoimmune diseases tend to improve during pregnancy and flare postpartum. It is recommended to conceive with disease under good control on a stable medication regimen to reduce the risk of flaring during pregnancy. If she does conceive while taking Benlysta, I recommend participation in a pregnancy registry (https://mothertobaby.org/ongoing-study/benlysta-belimumab). SLE increases pregnancy risks, including  birth, poor fetal growth, and  lupus. The risk of preeclampsia is also increased. Treatment with hydroxychrloroquine (plaquenil) has been shown to reduce some of these risks. Baseline laboratory evaluation recommended includes: CBC, CMP, SSA/SSB, Lupus anticoagulant (or RVVT), anti B2 glycoprotein, SOHAM, anti-ds DNA, C3, C4, urine sediment, and 24h urine protein.  These labs should be repeated for worsening hypertension or other clinical suspicion for possible lupus flare. Co-management by Dr. Brianna Hewitt during a future pregnancy is recommended. Generalized anxiety disorder with panic attacks  She currently is treated with Lorazepam PRN (takes a few times a week) for panic attacks, as well as Ambien at bedtime. Ambien is not known to be teratogenic, but should be used with caution in the third trimester due to risk of  respiratory depression and low birth weight. Benzodiazepines including Lorazepam (Ativan) are generally advised against during pregnancy due to possible risk of teratogenicity, risk of sedation,  respiratory depression, hypotonia, and withdrawal symptoms. Some animal studies have suggested adverse neurodevelopmental outcomes in animal studies with benzodiazepines. I discussed these risks of benzodiazepines and advised that she work closely with her psychiatrist and therapist to evaluate whether there are therapeutic alternatives with more favorable risk profiles in pregnancy. We reviewed that decisions about treatment of maternal mental illness need to be individualized, and that there are also risks of untreated mental illness in pregnancy. We reviewed potential risks of abruptly stopping benzodiazepines, including seizures. Hashimoto's disease  She is currently euthyroid on 25mcg levothyroxine daily, her most recent TSH was 1.6 on 23. Serial monitoring of TSH each trimester of pregnancy, with adjustment of levothyroxine dose to achieve trimester-specific goal ranges is advised during pregnancy. Pre-conception counseling  For any woman who wishes to become pregnant (or not preventing pregnancy), I recommend a daily prenatal vitamin, or at minimum, 400mcg of folate daily to reduce risk of neural tube defects. A healthy diet and exercise (150 minutes or more per week) are recommended. Her current body mass index is Body mass index is 24.18 kg/m². No change is needed pre-conception.  Use of alcohol, tobacco, and illicit substances should be avoided. I recommend that she be up-to-date on all preventive health, including pap smears, mammography, and colonoscopy (if indicated). Vaccines should be up-to-date as well, including annual influenza vaccination. COVID-19 vaccination is recommended, and mRNA vaccines (Carey Fret) are favored in women of reproductive age. I recommend her immunity to vaccine-preventable illnesses including Varicella, Measles/Mumps/Rubella, and Hepatitis B be assessed by checking antibody titers (IgG), and that she receive boosters for any waning immunity. Carrier screening for Cystic Fibrosis, Spinal Muscular Atrophy, and a Hemoglobin Electrophoresis are recommended pre-conception to assess risk for future pregnancies. Due to focus on her medical co-morbidities, we did not have time to address this today. I recommend this be offered pre-conception by her primary OB/GYN. Carrier screening panels are also available, if desired. Genetic counseling is also available in our office prior to conception, if desired to understand risk of genetic syndromes, including aneuploidy and autosomal recessive conditions in a future pregnancy. Christina Trevon and Francisco Phan are ultimately undecided on whether to pursue pregnancy, and their timeline of trying to conceive. They are currently gathering information. They are  and are having a wedding in a few months. We discussed that once she is on a stable medication regimen for her SLE that does not include methotrexate, it is reasonable to try to conceive. Ongoing attempts at optimal glycemic control are recommended, and I acknowledged that we may not be able to achieve an A1C of <6 given risk of hypoglcyemia. We reviewed that once she is off methotrexate, there is no contraindication to pregnancy, but that a future pregnancy would certainly be high-risk.  We discussed the frequency of ultrasounds and antepartum surveillance, which would be in addition to routine OB care and sub-speciality appointments. We also reviewed the alternatives to pregnancy, including surrogacy and adoption. I encouraged Rosalinda Brunson and Lillian Nguyensandra to look into potential benefits related to family planning that either of their employers offer. We reviewed that if she has not conceived within 12 months, that additional fertility evaluation and treatment may be indicated, which should be discussed with her primary OB/GYN. Evaluation and Management:  By TIME: The patient was counseled regarding the above recommendations. The approximate face-to-face time was 60 minutes. The majority of time (>50%) was spent counseling and/or coordinating care with the patient and/or family members. At the conclusion of today's encounter, all questions were answered to her satisfaction. Thank you very much for this kind referral and please do not hesitate to contact me with any further questions or concerns.     Sincerely,    Lino Hernandez MD  Attending Physician, [address] [place] [address]

## 2023-10-06 NOTE — LETTER
2023     Halley Sesay, [address] [place] [address]    Patient: Haleigh Frey   YOB: 1995   Date of Visit: 10/6/2023       Dear Dr. Yaneth Rios:    I did a pre-conception consult with Phoebe Barrios today. It sounds like she is not immediately planning pregnancy, but may consider it in the future. We spent an hour together today and covered most of her high-risk co-morbidities. We did not have a chance to discuss carrier screening, but I would recommend addressing this prior to pregnancy. Could you review carrier screening when you see her? Thank you. Sincerely,        Kristel Brantley MD        CC: No Recipients    Kristel Brantley MD  10/6/2023  2:51 PM  Sign when Signing Visit  CONSULTATION: MATERNAL-FETAL MEDICINE    Dear Halley Sesay, [address] [place] [address],    Thank you very much for your kind referral of patient Haleigh Frey for Maternal-Fetal Medicine consultation. She presents with her partner Jericho Mcgee. As you know, Ms. Vannessa Lopez is a 34 y.o. [id number] presenting for pre-conception consultation. She has a complex medical history, with active problem list below. Today's consultation focused on the followin. Type 1 Diabetes  2. Systemic Lupus Erythematosus  3. Depression, Anxiety and PTSD  4.  Hypothyroidism    Patient Active Problem List   Diagnosis   • Patellofemoral pain syndrome of right knee   • Abnormal liver function test   • Right sided abdominal pain   • Chronic fatigue and malaise   • Uncontrolled type 1 diabetes mellitus with hypoglycemia without coma (HCC)   • Abnormal stools   • Urinary frequency   • Concussion without loss of consciousness   • Nausea and vomiting   • Agitation   • Chronic post-traumatic stress disorder (PTSD)   • Generalized anxiety disorder with panic attacks   • Activity intolerance   • OCD (obsessive compulsive disorder)   • Chronic pain of right knee   • Chronic abdominal pain   • Blood in the stool   • Visual changes   • Hashimoto's disease   • Dysuria   • Paresthesia of left leg   • Vitamin D deficiency   • Neuropathy   • Polyarthritis   • Muscle weakness   • Word finding difficulty   • Insulin pump status   • Secondary amenorrhea   • Chronic back pain   • Syncope   • Vulvodynia   • Yeast infection   • Bipolar affective disorder (HCC)   • Weakness generalized   • Low serum vitamin B12   • Arthralgia   • Positive SOHAM (antinuclear antibody)   • Seizures ([facility])   • History of anesthesia complications   • Rheumatoid factor positive   • Hyperlipidemia   • Fatty stools   • Left low back pain   • Visual hallucinations   • Elevated prolactin level   • Type 1 diabetes mellitus (HCC)   • Nail abnormality   • Verruca   • Primary hypothyroidism   • Physical deconditioning   • Right-sided back pain   • Other chest pain   • Gastroesophageal reflux disease   • Constipation   • Bruising   • Type 1 diabetes mellitus with hyperglycemia (HCC)   • Controlled diabetes mellitus type 1 with complications (HCC)   • Delayed emergence from anesthesia   • Hearing loss of right ear   • Undifferentiated connective tissue disease (HCC)   • Pigmentation abnormality of skin   • Hypotension   • Other forms of systemic lupus erythematosus ([address])   • Pre-conception counseling       Obstetric History:  OB History    Para Term  AB Living   1 0 0 0 1     SAB IAB Ectopic Multiple Live Births   1 0 0          # Outcome Date GA Lbr Waldemar/2nd Weight Sex Delivery Anes PTL Lv   1 SAB 2018              Obstetric Comments   Menarche age 10       Past Medical History:  Past Medical History:   Diagnosis Date   • Abdominal pain    • Anaphylaxis    • Anxiety    • Anxiety and depression    • COVID-19 2020   • Delayed emergence from anesthesia 2023    Severe episode of emergence delirium (confused, combative) after MAC anesthetic on 2023 for EGD.  Received versed 2mg, >50mcg precedex, 5mg IV haldol, and 50mcg fentanyl. Waxing and waning episodes of agitation. Any future anesthetics should NOT be done at [facility]. • Delirium, induced by drug     anesthesia   • Depression    • Diabetes mellitus ([address])    • Disease of thyroid gland     Hashimoto   • DKA, type 1 ([address]) 05/11/2021   • Eating disorder     history of anorexia/bulemia [id number]   • Food intolerance    • Fracture of fibula     R Salter I   • Mamie Lisa disease    • Hashimoto's disease 02/21/2020   • Head injury    • Headache(784.0)    • Nasal congestion    • PTSD (post-traumatic stress disorder)    • Rectal bleed    • Seizures (HCC)    • Type 1 diabetes ([address])        Past Surgical History:  Past Surgical History:   Procedure Laterality Date   • COLONOSCOPY     • KNEE SURGERY Right 07/06/2020   • NASAL SEPTOPLASTY W/ TURBINOPLASTY     • SINUS SURGERY     • SKIN BIOPSY     • TURBINOPLASTY N/A 3/22/2021    Procedure: Stanley Pearl;  Surgeon: Judyann Gowers, MD;  Location: [facility];  Service: ENT   • UPPER GASTROINTESTINAL ENDOSCOPY     • WISDOM TOOTH EXTRACTION     • WRIST SURGERY      left; Excision of ganglion       Social History:   She denies current use of alcohol, drugs of abuse, tobacco, and marijuana products. Occupation: [employer]. Odessa Hogan is currently on short term and long-term disability. Partner: Linda Salcido, age 25. Goes by Granicus. Family History:  Family history was reviewed using an office screening tool, and is negative for congenital anomalies, genetic diseases, and thromboembolism in first degree relatives of this pregnancy. Family history is notable for diabetes on Jenny's side of the family.     Medications:    Current Outpatient Medications:   •  Azelastine HCl 137 MCG/SPRAY SOLN, 2 SPRAYS INTO EACH NOSTRIL 2 (TWO) TIMES A DAY AS NEEDED FOR RHINITIS USE IN EACH NOSTRIL AS DIRECTED, Disp: 90 mL, Rfl: 0  •  azithromycin (ZITHROMAX) 250 mg tablet, , Disp: , Rfl:   •  BD Insulin Syringe U/F 31G X 5/16" 0.5 ML MISC, Use as directly with weekly methotrexate injection. , Disp: 100 each, Rfl: 0  •  dexamethasone (DECADRON) 2 mg tablet, Take 1 tablet (2 mg total) by mouth daily as needed (headache), Disp: 5 tablet, Rfl: 2  •  folic acid (FOLVITE) 1 mg tablet, Take 1 tablet (1 mg total) by mouth daily, Disp: 90 tablet, Rfl: 3  •  gabapentin (Neurontin) 300 mg capsule, Take 1 capsule (300 mg total) by mouth daily at bedtime PRN, Disp: 90 capsule, Rfl: 3  •  glucagon 1 MG injection, 1 mg, Disp: , Rfl:   •  Glucagon HCl (Glucagon Emergency) 1 MG/ML SOLR, INJECT 1 MG AS DIRECTED AS NEEDED (WHEN PASSED OUT FROM LOW BLOOD SUGAR). , Disp: , Rfl:   •  HYDROcodone-acetaminophen (NORCO) 5-325 mg per tablet, Take 1 tablet by mouth every 6 (six) hours as needed for pain, Disp: , Rfl:   •  hydroxychloroquine (PLAQUENIL) 200 mg tablet, Take 1 tablet (200 mg total) by mouth 2 (two) times a day with meals, Disp: 180 tablet, Rfl: 3  •  Insulin Disposable Pump (Omnipod 5 G6 Pod, Gen 5,) MISC, INJECT 1 EACH UNDER THE SKIN EVERY THIRD DAY. E10.65, Disp: , Rfl:   •  Insulin Pen Needle (BD PEN NEEDLE NASIM U/F) 32G X 4 MM MISC, by Does not apply route 4 (four) times a day for 180 days, Disp: 400 each, Rfl: 3  •  levonorgestrel-ethinyl estradiol (Altavera) 0.15-30 MG-MCG per tablet, Take 1 tablet by mouth daily, Disp: 84 tablet, Rfl: 3  •  levothyroxine 25 mcg tablet, Take 1 tablet (25 mcg total) by mouth daily, Disp: 60 tablet, Rfl: 0  •  LORazepam (Ativan) 0.5 mg tablet, Take 1 tablet (0.5 mg total) by mouth 2 (two) times a day as needed for anxiety, Disp: 60 tablet, Rfl: 0  •  methotrexate 50 MG/2ML injection, Inject 0.8 mL (20 mg total) under the skin once a week, Disp: 4 mL, Rfl: 5  •  NovoLOG 100 UNIT/ML injection, INJECT 3 TIMES A DAY WITH MEALS BASED ON ICR 4 AND ISF 30 FOR TDD 90 UNITS DAILY VIA INSULIN PUMP, Disp: , Rfl:   •  OneTouch Verio test strip, USE 4 TIMES A DAY BEFORE MEALS AND AT BEDTIME, Disp: , Rfl:   •  pancrelipase, Lip-Prot-Amyl, (CREON) 12,000 units capsule, Take 36,000 units of lipase by mouth 3 (three) times a day with meals Take 3 capsules prior to each meal and 1 prior to snack, Disp: 120 capsule, Rfl: 3  •  promethazine (PHENERGAN) 12.5 MG tablet, Take 1 tablet (12.5 mg total) by mouth every 6 (six) hours as needed for nausea or vomiting (Patient not taking: Reported on 10/4/2023), Disp: 30 tablet, Rfl: 0  •  Tresiba FlexTouch 100 units/mL injection pen, INJECT 34 UNITS DAILY IN THE EVENT OF PUMP FAILURE E10.65, Disp: , Rfl:   •  zolpidem (AMBIEN) 10 mg tablet, Take 1 tablet (10 mg total) by mouth daily at bedtime as needed for sleep, Disp: 30 tablet, Rfl: 0  No current facility-administered medications for this visit. Allergies: Allergies   Allergen Reactions   • Insulin Glargine Other (See Comments)     LANTUS BRAND -Burning and redness under skin        Review of Systems:  Review of Systems    Exam:  Physical Exam  Constitutional:       Appearance: Normal appearance. HENT:      Head: Normocephalic and atraumatic. Nose: No congestion. Pulmonary:      Effort: Pulmonary effort is normal.   Neurological:      General: No focal deficit present. Mental Status: She is alert and oriented to person, place, and time. Vitals: Blood pressure 98/60, pulse 86, height 5' 2" (1.575 m), weight 60 kg (132 lb 3.2 oz), not currently breastfeeding. My recommendations are as follows:     Type 1 diabetes mellitus ([address])  We discussed the current status of her Type 1 Diabetes. She is currently managed by Dr. Sanjuana Bowen at [facility] for endocrinology and plans to continue care with him during pregnancy. She has a continuous glucose monitor and an OmniPod insulin pump. She describes her diabetes as brittle, and has multiple prior episodes of DKA. Her most recent Hemoglobin A1C was 6.4 on 6/5/23. We discussed that she can continue to utilize her OmniPod and CGM during pregnancy, but will also require accu-checks.  We discussed the changing insulin requirements during pregnancy, as well as more stringent glycemic control required for normal fetal development. Generally, we advise an A1C of <6 pre-conception, though this may not be feasible if she is having episodes of hypoglycemia. She has a consultation with [address] [place] [address] next week to discuss management of diabetes in pregnancy in further detail. I advised her that we generally recommend that we provide comprehensive diabetes management during pregnancy. If she feels most comfortable following with her endocrinologist, we would at minimum request that she share glucose data with us from her CGM and glucometer in order to inform our recommendations regarding antepartum surveillance and delivery timing. Pregnancies complicated by pre-gestational diabetes are associated with increased  and maternal morbidity. Many of the risks are correlated to glycemic control entering pregnancy (measured by Hemoglobin A1C), and affected by antepartum glycemic control. Fetal risks include macrosomia, shoulder dystocia, birth injuries, hypoglycemia, hyperbilirubinemia, and potential long-term sequelae such as obesity and impaired intellectual achievement. Maternal risks include preeclampsia and hypertensive disorders of pregnancy, operative delivery. Aggressive treatment of pregestational diabetes with diet, exercise, and medications including metformin and insulin have been shown to decrease fetal and  morbidity and mortality. We have a diabetes in pregnancy program where we follow our patients closely to help achieve optimal maternal and  outcomes. If her hemoglobin A1C is 6.5 or greater, an early anatomic survey for malformations is also recommended. Following a 20 week anatomic survey, fetal echocardiography is recommended at 22 weeks gestation, due an increased risk of congenital heart disease.  Subsequently, evaluation of fetal growth every 4 weeks is recommended beginning at 28 weeks gestation. Antepartum fetal surveillance should be initiated twice weekly at 32 weeks gestation. Delivery timing should be individualized based on glycemic control. Patients who remain well controlled on medication should deliver between 39 0/7 and 39 6/7 weeks gestation, unless additional obstetric factors necessitate otherwise. Due to the risk of preeclampsia, I recommend low dose aspirin 162mg daily from 12 until 36 weeks gestation. Other forms of systemic lupus erythematosus ([address])  We discussed her Systemic Lupus Erythematosus (SLE). Her Rheumatologist is Dr. Koko [facility]akat at [facility]. She is currently treated with Plaquenil (which is compatible with pregnancy), and methotrexate (which is absolutely contraindicated during pregnancy and breastfeeding). We discussed that based on the half life of methotrexate, it should be completely eliminated from the body within a month of stopping treatment. The  recommends waiting 3-6 months prior to conceiving. I advised that Griselda Sprinkles begin taking a prenatal vitamin at least a month prior to stopping her oral contraceptives. Dr. Koko Barakat is preparing to start Benlysta in an attempt to wean Griselda Sprinkles off methotrexate. Benlysta (belimumab) is a monoclonal antibody that inhibits B-Lymphocyte stimulator. No human data is available regarding belimumab, there is no known risk of teratogenicity, but there is a risk of transient B-cell depletion based on animal data (source: [employer], accessed 10/6/23). We reviewed her history of systemic lupus erythematosus (SLE) which is sub-optimally controlled at this time. Her primary symptoms are joint pain, swelling, and rashes. Her creatinine is fortunately normal (0.73 on 7/8/23), and she has no history of renal involvement. She is followed by Dr. Koko Barakat from Rheumatology. We discussed that autoimmune diseases tend to improve during pregnancy and flare postpartum.  It is recommended to conceive with disease under good control on a stable medication regimen to reduce the risk of flaring during pregnancy. If she does conceive while taking Benlysta, I recommend participation in a pregnancy registry (https://mothertobaby.org/ongoing-study/benlysta-belimumab). SLE increases pregnancy risks, including  birth, poor fetal growth, and  lupus. The risk of preeclampsia is also increased. Treatment with hydroxychrloroquine (plaquenil) has been shown to reduce some of these risks. Baseline laboratory evaluation recommended includes: CBC, CMP, SSA/SSB, Lupus anticoagulant (or RVVT), anti B2 glycoprotein, SOHAM, anti-ds DNA, C3, C4, urine sediment, and 24h urine protein. These labs should be repeated for worsening hypertension or other clinical suspicion for possible lupus flare. Co-management by Dr. Kim Chappell during a future pregnancy is recommended. Generalized anxiety disorder with panic attacks  She currently is treated with Lorazepam PRN (takes a few times a week) for panic attacks, as well as Ambien at bedtime. Ambien is not known to be teratogenic, but should be used with caution in the third trimester due to risk of  respiratory depression and low birth weight. Benzodiazepines including Lorazepam (Ativan) are generally advised against during pregnancy due to possible risk of teratogenicity, risk of sedation,  respiratory depression, hypotonia, and withdrawal symptoms. Some animal studies have suggested adverse neurodevelopmental outcomes in animal studies with benzodiazepines. I discussed these risks of benzodiazepines and advised that she work closely with her psychiatrist and therapist to evaluate whether there are therapeutic alternatives with more favorable risk profiles in pregnancy. We reviewed that decisions about treatment of maternal mental illness need to be individualized, and that there are also risks of untreated mental illness in pregnancy.  We reviewed potential risks of abruptly stopping benzodiazepines, including seizures. Hashimoto's disease  She is currently euthyroid on 25mcg levothyroxine daily, her most recent TSH was 1.6 on 6/5/23. Serial monitoring of TSH each trimester of pregnancy, with adjustment of levothyroxine dose to achieve trimester-specific goal ranges is advised during pregnancy. Pre-conception counseling  For any woman who wishes to become pregnant (or not preventing pregnancy), I recommend a daily prenatal vitamin, or at minimum, 400mcg of folate daily to reduce risk of neural tube defects. A healthy diet and exercise (150 minutes or more per week) are recommended. Her current body mass index is Body mass index is 24.18 kg/m². No change is needed pre-conception. Use of alcohol, tobacco, and illicit substances should be avoided. I recommend that she be up-to-date on all preventive health, including pap smears, mammography, and colonoscopy (if indicated). Vaccines should be up-to-date as well, including annual influenza vaccination. COVID-19 vaccination is recommended, and mRNA vaccines (Shelly [place]) are favored in women of reproductive age. I recommend her immunity to vaccine-preventable illnesses including Varicella, Measles/Mumps/Rubella, and Hepatitis B be assessed by checking antibody titers (IgG), and that she receive boosters for any waning immunity. Carrier screening for Cystic Fibrosis, Spinal Muscular Atrophy, and a Hemoglobin Electrophoresis are recommended pre-conception to assess risk for future pregnancies. Due to focus on her medical co-morbidities, we did not have time to address this today. I recommend this be offered pre-conception by her primary OB/GYN. Carrier screening panels are also available, if desired. Genetic counseling is also available in our office prior to conception, if desired to understand risk of genetic syndromes, including aneuploidy and autosomal recessive conditions in a future pregnancy.      Melvin Jovel are ultimately undecided on whether to pursue pregnancy, and their timeline of trying to conceive. They are currently gathering information. They are  and are having a wedding in a few months. We discussed that once she is on a stable medication regimen for her SLE that does not include methotrexate, it is reasonable to try to conceive. Ongoing attempts at optimal glycemic control are recommended, and I acknowledged that we may not be able to achieve an A1C of <6 given risk of hypoglcyemia. We reviewed that once she is off methotrexate, there is no contraindication to pregnancy, but that a future pregnancy would certainly be high-risk. We discussed the frequency of ultrasounds and antepartum surveillance, which would be in addition to routine OB care and sub-speciality appointments. We also reviewed the alternatives to pregnancy, including surrogacy and adoption. I encouraged Boyd Miller and Torito Myers to look into potential benefits related to family planning that either of their employers offer. We reviewed that if she has not conceived within 12 months, that additional fertility evaluation and treatment may be indicated, which should be discussed with her primary OB/GYN. Evaluation and Management:  By TIME: The patient was counseled regarding the above recommendations. The approximate face-to-face time was 60 minutes. The majority of time (>50%) was spent counseling and/or coordinating care with the patient and/or family members. At the conclusion of today's encounter, all questions were answered to her satisfaction. Thank you very much for this kind referral and please do not hesitate to contact me with any further questions or concerns.     Sincerely,    Antwan James MD  Attending Physician, [address] [place] [address]

## 2023-10-06 NOTE — ASSESSMENT & PLAN NOTE
She is currently euthyroid on 25mcg levothyroxine daily, her most recent TSH was 1.6 on 6/5/23. Serial monitoring of TSH each trimester of pregnancy, with adjustment of levothyroxine dose to achieve trimester-specific goal ranges is advised during pregnancy.

## 2023-10-06 NOTE — ASSESSMENT & PLAN NOTE
We discussed her Systemic Lupus Erythematosus (SLE). Her Rheumatologist is Dr. Reena Ag at [facility]. She is currently treated with Plaquenil (which is compatible with pregnancy), and methotrexate (which is absolutely contraindicated during pregnancy and breastfeeding). We discussed that based on the half life of methotrexate, it should be completely eliminated from the body within a month of stopping treatment. The  recommends waiting 3-6 months prior to conceiving. I advised that Shayla Dalton begin taking a prenatal vitamin at least a month prior to stopping her oral contraceptives. Dr. Reena Ag is preparing to start Benlysta in an attempt to wean Shayla Dalton off methotrexate. Benlysta (belimumab) is a monoclonal antibody that inhibits B-Lymphocyte stimulator. No human data is available regarding belimumab, there is no known risk of teratogenicity, but there is a risk of transient B-cell depletion based on animal data (source: [employer], accessed 10/6/23). We reviewed her history of systemic lupus erythematosus (SLE) which is sub-optimally controlled at this time. Her primary symptoms are joint pain, swelling, and rashes. Her creatinine is fortunately normal (0.73 on 23), and she has no history of renal involvement. She is followed by Dr. Reena Ag from Rheumatology. We discussed that autoimmune diseases tend to improve during pregnancy and flare postpartum. It is recommended to conceive with disease under good control on a stable medication regimen to reduce the risk of flaring during pregnancy. If she does conceive while taking Benlysta, I recommend participation in a pregnancy registry (https://mothertobaby.org/ongoing-study/benlysta-belimumab). SLE increases pregnancy risks, including  birth, poor fetal growth, and  lupus. The risk of preeclampsia is also increased. Treatment with hydroxychrloroquine (plaquenil) has been shown to reduce some of these risks.      Baseline laboratory evaluation recommended includes: CBC, CMP, SSA/SSB, Lupus anticoagulant (or RVVT), anti B2 glycoprotein, SOHAM, anti-ds DNA, C3, C4, urine sediment, and 24h urine protein. These labs should be repeated for worsening hypertension or other clinical suspicion for possible lupus flare. Co-management by Dr. Tim Jeter during a future pregnancy is recommended.

## 2023-10-06 NOTE — ASSESSMENT & PLAN NOTE
We discussed the current status of her Type 1 Diabetes. She is currently managed by Dr. Larry Amaya at [facility] for endocrinology and plans to continue care with him during pregnancy. She has a continuous glucose monitor and an OmniPod insulin pump. She describes her diabetes as brittle, and has multiple prior episodes of DKA. Her most recent Hemoglobin A1C was 6.4 on 23. We discussed that she can continue to utilize her OmniPod and CGM during pregnancy, but will also require accu-checks. We discussed the changing insulin requirements during pregnancy, as well as more stringent glycemic control required for normal fetal development. Generally, we advise an A1C of <6 pre-conception, though this may not be feasible if she is having episodes of hypoglycemia. She has a consultation with [address] [place] [address] next week to discuss management of diabetes in pregnancy in further detail. I advised her that we generally recommend that we provide comprehensive diabetes management during pregnancy. If she feels most comfortable following with her endocrinologist, we would at minimum request that she share glucose data with us from her CGM and glucometer in order to inform our recommendations regarding antepartum surveillance and delivery timing. Pregnancies complicated by pre-gestational diabetes are associated with increased  and maternal morbidity. Many of the risks are correlated to glycemic control entering pregnancy (measured by Hemoglobin A1C), and affected by antepartum glycemic control. Fetal risks include macrosomia, shoulder dystocia, birth injuries, hypoglycemia, hyperbilirubinemia, and potential long-term sequelae such as obesity and impaired intellectual achievement. Maternal risks include preeclampsia and hypertensive disorders of pregnancy, operative delivery.  Aggressive treatment of pregestational diabetes with diet, exercise, and medications including metformin and insulin have been shown to decrease fetal and  morbidity and mortality. We have a diabetes in pregnancy program where we follow our patients closely to help achieve optimal maternal and  outcomes. If her hemoglobin A1C is 6.5 or greater, an early anatomic survey for malformations is also recommended. Following a 20 week anatomic survey, fetal echocardiography is recommended at 22 weeks gestation, due an increased risk of congenital heart disease. Subsequently, evaluation of fetal growth every 4 weeks is recommended beginning at 28 weeks gestation. Antepartum fetal surveillance should be initiated twice weekly at 32 weeks gestation. Delivery timing should be individualized based on glycemic control. Patients who remain well controlled on medication should deliver between 39 0/7 and 39 6/7 weeks gestation, unless additional obstetric factors necessitate otherwise. Due to the risk of preeclampsia, I recommend low dose aspirin 162mg daily from 12 until 36 weeks gestation.

## 2023-10-06 NOTE — ASSESSMENT & PLAN NOTE
For any woman who wishes to become pregnant (or not preventing pregnancy), I recommend a daily prenatal vitamin, or at minimum, 400mcg of folate daily to reduce risk of neural tube defects. A healthy diet and exercise (150 minutes or more per week) are recommended. Her current body mass index is Body mass index is 24.18 kg/m². No change is needed pre-conception. Use of alcohol, tobacco, and illicit substances should be avoided. I recommend that she be up-to-date on all preventive health, including pap smears, mammography, and colonoscopy (if indicated). Vaccines should be up-to-date as well, including annual influenza vaccination. COVID-19 vaccination is recommended, and mRNA vaccines (Chivo Dania) are favored in women of reproductive age. I recommend her immunity to vaccine-preventable illnesses including Varicella, Measles/Mumps/Rubella, and Hepatitis B be assessed by checking antibody titers (IgG), and that she receive boosters for any waning immunity. Carrier screening for Cystic Fibrosis, Spinal Muscular Atrophy, and a Hemoglobin Electrophoresis are recommended pre-conception to assess risk for future pregnancies. Due to focus on her medical co-morbidities, we did not have time to address this today. I recommend this be offered pre-conception by her primary OB/GYN. Carrier screening panels are also available, if desired. Genetic counseling is also available in our office prior to conception, if desired to understand risk of genetic syndromes, including aneuploidy and autosomal recessive conditions in a future pregnancy. Stefani and Cojay Nicole are ultimately undecided on whether to pursue pregnancy, and their timeline of trying to conceive. They are currently gathering information. They are  and are having a wedding in a few months. We discussed that once she is on a stable medication regimen for her SLE that does not include methotrexate, it is reasonable to try to conceive.  Ongoing attempts at optimal glycemic control are recommended, and I acknowledged that we may not be able to achieve an A1C of <6 given risk of hypoglcyemia. We reviewed that once she is off methotrexate, there is no contraindication to pregnancy, but that a future pregnancy would certainly be high-risk. We discussed the frequency of ultrasounds and antepartum surveillance, which would be in addition to routine OB care and sub-speciality appointments. We also reviewed the alternatives to pregnancy, including surrogacy and adoption. I encouraged Salud Alaniz and Diana Duran to look into potential benefits related to family planning that either of their employers offer. We reviewed that if she has not conceived within 12 months, that additional fertility evaluation and treatment may be indicated, which should be discussed with her primary OB/GYN.

## 2023-10-10 ENCOUNTER — HOSPITAL ENCOUNTER (OUTPATIENT)
Dept: INFUSION CENTER | Facility: CLINIC | Age: 28
Discharge: HOME | End: 2023-10-10
Payer: COMMERCIAL

## 2023-10-10 VITALS
DIASTOLIC BLOOD PRESSURE: 86 MMHG | RESPIRATION RATE: 18 BRPM | SYSTOLIC BLOOD PRESSURE: 118 MMHG | TEMPERATURE: 98.4 F | OXYGEN SATURATION: 99 % | HEART RATE: 74 BPM

## 2023-10-10 DIAGNOSIS — M32.8 OTHER FORMS OF SYSTEMIC LUPUS ERYTHEMATOSUS, UNSPECIFIED ORGAN INVOLVEMENT STATUS (HCC): Primary | ICD-10-CM

## 2023-10-10 PROCEDURE — 96413 CHEMO IV INFUSION 1 HR: CPT

## 2023-10-10 RX ORDER — ACETAMINOPHEN 325 MG/1
650 TABLET ORAL ONCE
OUTPATIENT
Start: 2023-10-24

## 2023-10-10 RX ORDER — SODIUM CHLORIDE 9 MG/ML
20 INJECTION, SOLUTION INTRAVENOUS ONCE
OUTPATIENT
Start: 2023-10-24

## 2023-10-10 RX ORDER — DIPHENHYDRAMINE HCL 25 MG
25 TABLET ORAL ONCE
OUTPATIENT
Start: 2023-10-24

## 2023-10-10 RX ORDER — SODIUM CHLORIDE 9 MG/ML
20 INJECTION, SOLUTION INTRAVENOUS ONCE
Status: COMPLETED | OUTPATIENT
Start: 2023-10-10 | End: 2023-10-10

## 2023-10-10 RX ORDER — ACETAMINOPHEN 325 MG/1
650 TABLET ORAL ONCE
Status: COMPLETED | OUTPATIENT
Start: 2023-10-10 | End: 2023-10-10

## 2023-10-10 RX ORDER — DIPHENHYDRAMINE HCL 25 MG
25 TABLET ORAL ONCE
Status: COMPLETED | OUTPATIENT
Start: 2023-10-10 | End: 2023-10-10

## 2023-10-10 RX ADMIN — SODIUM CHLORIDE 20 ML/HR: 0.9 INJECTION, SOLUTION INTRAVENOUS at 15:06

## 2023-10-10 RX ADMIN — ACETAMINOPHEN 650 MG: 325 TABLET ORAL at 15:07

## 2023-10-10 RX ADMIN — BELIMUMAB 600 MG: 120 INJECTION, POWDER, LYOPHILIZED, FOR SOLUTION INTRAVENOUS at 15:49

## 2023-10-10 RX ADMIN — DIPHENHYDRAMINE HYDROCHLORIDE 25 MG: 25 TABLET ORAL at 15:07

## 2023-10-10 NOTE — PROGRESS NOTES
Patient tolerated Benlysta and 30 minute observation without incident.  She is aware of her next appointment, declined AVS

## 2023-10-10 NOTE — PROGRESS NOTES
Patient arrives for first Benlysta infusion. Patient reports recent antibiotic use (Azithromycin finished Familia 10/8) for a dental infection. Per patient plan, spoke with Dr. Rufus Seay regarding recent antibiotic use. Per Dr. Rufus Seay, patient okay to receive treatment today. PIV placed L wrist without issue, brisk blood return noted, flushed without resistance. Patient provided water and resting comfortably in recliner, call bell within reach.

## 2023-10-10 NOTE — PROGRESS NOTES
Patient asking if she is to continue masking precautions in public as she was encouraged to do so with methotrexate regimen. Patient also asking if she is to continue taking her methotrexate while receiving Benlysta infusion as she is scheduled to take a dose this evening. Spoke with Dr. Anatoliy Gonzalez regarding patient's inquiries and Dr. Anatoliy Gonzalez states patient should continue to follow precautions and medication regimens as previously discussed. Patient made aware of same.

## 2023-10-12 ENCOUNTER — OFFICE VISIT (OUTPATIENT)
Facility: HOSPITAL | Age: 28
End: 2023-10-12

## 2023-10-12 DIAGNOSIS — E10.65 TYPE 1 DIABETES MELLITUS WITH HYPERGLYCEMIA (HCC): Primary | ICD-10-CM

## 2023-10-12 NOTE — PROGRESS NOTES
Assessment/Plan:       Diagnoses and all orders for this visit:    Type 1 diabetes mellitus with hyperglycemia ([address])      Plan is to transfer to [facility] endocrinology to establish care prior to planned pregnancy. Then will start sharing glucose data with units of insulin for regimen adjustments. Aim to eat every 3.5 hours during the day and no more than 8 to 10 hours of fasting during sleep hours. Goal to keep glucose readings between  with minimal variations in glucose readings. During pregnancy glucose goals FBS 60-90; 1 hour post meal 140 or less; 2 hours post meal 120 or less; before meals/overnight . Subjective:      Patient ID: Lenka Barnett is a 34 y.o. female T1DM who desires pregnancy and wanted information regarding diabetes management during pregnancy. Had pre-conception counseling with Dr. Nichole Monae. Codie Santos 33 yo T1DM diagnosed 4 years ago with history of multiple episodes of DKA then diagnosed with lupus, max 1 week of steroids at a time. Currently on a new treatment for lupus. History of hypothyroidism- on levothyroxine; reported abrupt increase in weight with diagnosis by Dr. Matias Weston. Needs to be evaluated by nephrology. Currently has [facility] endocrinologist but given desired planned pregnancy would like to establish care at [facility], all under one network.  present for visit. Codie Santos on MDI; history of being on an insulin pump but prefers injections. Eats usually one meal a day with a snack. Currently taking a break from Dexcom CGM due to skin irritation. Reported wide fluctuations in glucose readings. Currently on tresiba daily and Novolog before meals. Dealing with nausea/vomiting on phenergan. Pancreatic insufficiency and on digestive enzymes.        The following portions of the patient's history were reviewed and updated as appropriate: allergies, current medications, past family history, past medical history, past social history, past surgical history and problem list.    Allergies   Allergen Reactions    Insulin Glargine Other (See Comments)     LANTUS BRAND -Burning and redness under skin      Current Outpatient Medications on File Prior to Visit   Medication Sig Dispense Refill    Azelastine HCl 137 MCG/SPRAY SOLN 2 SPRAYS INTO EACH NOSTRIL 2 (TWO) TIMES A DAY AS NEEDED FOR RHINITIS USE IN EACH NOSTRIL AS DIRECTED 90 mL 0    azithromycin (ZITHROMAX) 250 mg tablet       BD Insulin Syringe U/F 31G X 5/16" 0.5 ML MISC Use as directly with weekly methotrexate injection. 100 each 0    dexamethasone (DECADRON) 2 mg tablet Take 1 tablet (2 mg total) by mouth daily as needed (headache) 5 tablet 2    folic acid (FOLVITE) 1 mg tablet Take 1 tablet (1 mg total) by mouth daily 90 tablet 3    gabapentin (Neurontin) 300 mg capsule Take 1 capsule (300 mg total) by mouth daily at bedtime PRN 90 capsule 3    glucagon 1 MG injection 1 mg      Glucagon HCl (Glucagon Emergency) 1 MG/ML SOLR INJECT 1 MG AS DIRECTED AS NEEDED (WHEN PASSED OUT FROM LOW BLOOD SUGAR).       HYDROcodone-acetaminophen (NORCO) 5-325 mg per tablet Take 1 tablet by mouth every 6 (six) hours as needed for pain      hydroxychloroquine (PLAQUENIL) 200 mg tablet Take 1 tablet (200 mg total) by mouth 2 (two) times a day with meals 180 tablet 3    Insulin Disposable Pump (Omnipod 5 G6 Pod, Gen 5,) MISC INJECT 1 EACH UNDER THE SKIN EVERY THIRD DAY. E10.65      Insulin Pen Needle (BD PEN NEEDLE NASIM U/F) 32G X 4 MM MISC by Does not apply route 4 (four) times a day for 180 days 400 each 3    levonorgestrel-ethinyl estradiol (Altavera) 0.15-30 MG-MCG per tablet Take 1 tablet by mouth daily 84 tablet 3    levothyroxine 25 mcg tablet Take 1 tablet (25 mcg total) by mouth daily 60 tablet 0    LORazepam (Ativan) 0.5 mg tablet Take 1 tablet (0.5 mg total) by mouth 2 (two) times a day as needed for anxiety 60 tablet 0    methotrexate 50 MG/2ML injection Inject 0.8 mL (20 mg total) under the skin once a week 4 mL 5    NovoLOG 100 UNIT/ML injection INJECT 3 TIMES A DAY WITH MEALS BASED ON ICR 4 AND ISF 30 FOR TDD 90 UNITS DAILY VIA INSULIN PUMP      OneTouch Verio test strip USE 4 TIMES A DAY BEFORE MEALS AND AT BEDTIME      pancrelipase, Lip-Prot-Amyl, (CREON) 12,000 units capsule Take 36,000 units of lipase by mouth 3 (three) times a day with meals Take 3 capsules prior to each meal and 1 prior to snack 120 capsule 3    promethazine (PHENERGAN) 12.5 MG tablet Take 1 tablet (12.5 mg total) by mouth every 6 (six) hours as needed for nausea or vomiting (Patient not taking: Reported on 10/4/2023) 30 tablet 0    Tresiba FlexTouch 100 units/mL injection pen INJECT 34 UNITS DAILY IN THE EVENT OF PUMP FAILURE E10.65      zolpidem (AMBIEN) 10 mg tablet Take 1 tablet (10 mg total) by mouth daily at bedtime as needed for sleep 30 tablet 0     Current Facility-Administered Medications on File Prior to Visit   Medication Dose Route Frequency Provider Last Rate Last Admin    alteplase (CATHFLO) injection 2 mg  2 mg Intracatheter Q1MIN PRN Sofi Chung MD             Objective:        Component Value Date/Time    HGBA1C 6.4 (H) 06/05/2023 1314    HGBA1C 6.3 (H) 02/02/2023 1747    HGBA1C 7.5 (H) 07/07/2022 1648    HGBA1C 7.1 (H) 12/09/2021 1155      There were no vitals taken for this visit.           Time in:9:15 AM  Time out:10:15 AM

## 2023-10-13 ENCOUNTER — SOCIAL WORK (OUTPATIENT)
Dept: BEHAVIORAL/MENTAL HEALTH CLINIC | Facility: CLINIC | Age: 28
End: 2023-10-13
Payer: COMMERCIAL

## 2023-10-13 ENCOUNTER — TELEPHONE (OUTPATIENT)
Dept: PSYCHIATRY | Facility: CLINIC | Age: 28
End: 2023-10-13

## 2023-10-13 DIAGNOSIS — F31.62 BIPOLAR DISORDER, CURRENT EPISODE MIXED, MODERATE (HCC): ICD-10-CM

## 2023-10-13 DIAGNOSIS — F42.2 MIXED OBSESSIONAL THOUGHTS AND ACTS: Primary | ICD-10-CM

## 2023-10-13 DIAGNOSIS — F41.0 GENERALIZED ANXIETY DISORDER WITH PANIC ATTACKS: ICD-10-CM

## 2023-10-13 DIAGNOSIS — F43.12 CHRONIC POST-TRAUMATIC STRESS DISORDER (PTSD): ICD-10-CM

## 2023-10-13 DIAGNOSIS — F41.1 GENERALIZED ANXIETY DISORDER WITH PANIC ATTACKS: ICD-10-CM

## 2023-10-13 PROCEDURE — 90834 PSYTX W PT 45 MINUTES: CPT | Performed by: SOCIAL WORKER

## 2023-10-13 NOTE — PSYCH
Behavioral Health Psychotherapy Progress Note    Psychotherapy Provided: Individual Psychotherapy     1. Mixed obsessional thoughts and acts        2. Generalized anxiety disorder with panic attacks        3. Chronic post-traumatic stress disorder (PTSD)        4. Bipolar disorder, current episode mixed, moderate ([address])            Goals addressed in session: Goal 1 and Goal 2     DATA:     Met with Jenny individually. Depressive symptoms have increased - situational.  Anxiety is high. Session focused upon recent Dr. Miguel Hoffmann regarding risks of pregnancy. Especially  Discussed indepth discussion of Jenny's fears, concerns, possible plans. Christina Owens visited an [employer] . Very strong possibility he will give up his [employer] and enlist. Flare ups impeeds majority of functioning on a daily basis. Very tearful due to pain and discouragement of having to wait for recent infusions to kick in which is a few months. Had tooth extracted last week and has dry socket. Couples therapy going well thus far - communication and intimacy are primary concerns. Discussed relationship with sister. A bit better - also invited her to dress fitting next week along with mom. Practicing skills of boundary setting and not doing the 'push pull' with sister. Denied SI    During this session, this clinician used the following therapeutic modalities: Client-centered Therapy, Cognitive Behavioral Therapy, Cognitive Processing Therapy, and Supportive Psychotherapy    Substance Abuse was not addressed during this session. If the client is diagnosed with a co-occurring substance use disorder, please indicate any changes in the frequency or amount of use: . Stage of change for addressing substance use diagnoses: No substance use/Not applicable    ASSESSMENT:  Lamont Don presents with a Anxious and Dysthymic mood.      her affect is Normal range and intensity, which is congruent, with her mood and the content of the session. The client has made progress on their goals. Beth Gupta presents with a low risk of suicide, low risk of self-harm, and low risk of harm to others. For any risk assessment that surpasses a "low" rating, a safety plan must be developed. A safety plan was indicated: no  If yes, describe in detail     PLAN: Between sessions, Beth Gupta will continue medical interventions, continue couples therapy, set boundaries with sister. At the next session, the therapist will use Client-centered Therapy, Cognitive Behavioral Therapy, Cognitive Processing Therapy, and Supportive Psychotherapy to address health, upcoming marriage, couples sessions, family communication. Behavioral Health Treatment Plan and Discharge Planning: Beth Gupta is aware of and agrees to continue to work on their treatment plan. They have identified and are working toward their discharge goals.  yes    Visit start and stop times:    10/13/23  Start Time: 1400  Stop Time: 1450  Total Visit Time: 50 minutes

## 2023-10-17 ENCOUNTER — TELEPHONE (OUTPATIENT)
Dept: OBGYN CLINIC | Facility: HOSPITAL | Age: 28
End: 2023-10-17

## 2023-10-17 NOTE — TELEPHONE ENCOUNTER
Caller: Patient    Doctor: Jonathan Melendez    Reason for call: Patient was prescribed doxycycline 2 times a day for a month for acne by Derm Doc. Patient wants to make sure she can take it.   Please advise    Call back#: [phone]

## 2023-10-18 ENCOUNTER — PATIENT MESSAGE (OUTPATIENT)
Dept: NEUROLOGY | Facility: CLINIC | Age: 28
End: 2023-10-18

## 2023-10-18 DIAGNOSIS — M35.9 UNDIFFERENTIATED CONNECTIVE TISSUE DISEASE (HCC): ICD-10-CM

## 2023-10-18 DIAGNOSIS — G44.86 CERVICOGENIC HEADACHE: Primary | ICD-10-CM

## 2023-10-18 RX ORDER — DIVALPROEX SODIUM 250 MG/1
TABLET, EXTENDED RELEASE ORAL
Qty: 8 TABLET | Refills: 1 | Status: SHIPPED | OUTPATIENT
Start: 2023-10-18

## 2023-10-18 RX ORDER — DEXAMETHASONE 2 MG/1
2 TABLET ORAL DAILY PRN
Qty: 5 TABLET | Refills: 2 | Status: SHIPPED | OUTPATIENT
Start: 2023-10-18

## 2023-10-18 RX ORDER — METHOTREXATE 25 MG/ML
INJECTION, SOLUTION INTRA-ARTERIAL; INTRAMUSCULAR; INTRAVENOUS
Qty: 4 ML | Refills: 5 | Status: SHIPPED | OUTPATIENT
Start: 2023-10-18

## 2023-10-20 ENCOUNTER — SOCIAL WORK (OUTPATIENT)
Dept: BEHAVIORAL/MENTAL HEALTH CLINIC | Facility: CLINIC | Age: 28
End: 2023-10-20
Payer: COMMERCIAL

## 2023-10-20 ENCOUNTER — APPOINTMENT (OUTPATIENT)
Dept: LAB | Age: 28
End: 2023-10-20
Payer: COMMERCIAL

## 2023-10-20 DIAGNOSIS — E10.69 TYPE I DIABETES MELLITUS WITH HYPEROSMOLAR COMA: ICD-10-CM

## 2023-10-20 DIAGNOSIS — F43.12 CHRONIC POST-TRAUMATIC STRESS DISORDER (PTSD): Primary | ICD-10-CM

## 2023-10-20 DIAGNOSIS — F41.0 GENERALIZED ANXIETY DISORDER WITH PANIC ATTACKS: ICD-10-CM

## 2023-10-20 DIAGNOSIS — M35.9 UNDIFFERENTIATED CONNECTIVE TISSUE DISEASE (HCC): ICD-10-CM

## 2023-10-20 DIAGNOSIS — E10.649 UNCONTROLLED TYPE 1 DIABETES MELLITUS WITH HYPOGLYCEMIA WITHOUT COMA (HCC): ICD-10-CM

## 2023-10-20 DIAGNOSIS — F41.1 GENERALIZED ANXIETY DISORDER WITH PANIC ATTACKS: ICD-10-CM

## 2023-10-20 DIAGNOSIS — E87.0 TYPE I DIABETES MELLITUS WITH HYPEROSMOLAR COMA: ICD-10-CM

## 2023-10-20 DIAGNOSIS — K04.7 DENTAL ABSCESS: ICD-10-CM

## 2023-10-20 DIAGNOSIS — E10.65 TYPE I DIABETES MELLITUS WITH HYPEROSMOLAR COMA: ICD-10-CM

## 2023-10-20 DIAGNOSIS — Z28.39 PERSONAL HISTORY OF UNDERIMMUNIZATION STATUS: ICD-10-CM

## 2023-10-20 LAB
EST. AVERAGE GLUCOSE BLD GHB EST-MCNC: 203 MG/DL
HBA1C MFR BLD: 8.7 %
TSH SERPL DL<=0.05 MIU/L-ACNC: 0.93 UIU/ML (ref 0.45–4.5)

## 2023-10-20 PROCEDURE — 86609 BACTERIUM ANTIBODY: CPT

## 2023-10-20 PROCEDURE — 90847 FAMILY PSYTX W/PT 50 MIN: CPT | Performed by: COUNSELOR

## 2023-10-20 PROCEDURE — 83036 HEMOGLOBIN GLYCOSYLATED A1C: CPT

## 2023-10-20 PROCEDURE — 36415 COLL VENOUS BLD VENIPUNCTURE: CPT

## 2023-10-20 PROCEDURE — 84443 ASSAY THYROID STIM HORMONE: CPT

## 2023-10-20 NOTE — PSYCH
Behavioral Health Psychotherapy Progress Note    Psychotherapy Provided: Family Therapy    1. Chronic post-traumatic stress disorder (PTSD)        2. Generalized anxiety disorder with panic attacks            Goals addressed in session: Goal 1     DATA: The therapist, Mahamed Villa and Dede De Santiago review and discuss the last session and their treatment plan. The therapist ask Mahamed Villa and Dede De Santiago how they feel since the last session. Dede De Santiago reports, "We argue less." Mahamed Villa reports, "We had an argument and the issues are present." Mahamed Villa and Dede De Santiago communicate their concerns about arguments and what an argument is. The therapist educates Dede De Santiago and Mahamed Villa on the definition of an argument. The therapist encourages Mahamed Villa and Dede De Santiago to communicate their thoughts and feelings in regard to what an argument means and look like to each of them. Dede De Santiago reports, "Keep holding the problem and keep going." Mahamed Villa provides details of a recent argument. Mahamed Villa provides details of her and Dede De Santiago relationship with her sister. Dede De Santiago communicates his concerns about how Jenny's family members treat her. Mahmaed Villa and Dede De Santiago continue to report how the strained family relationships on both sides of the family impact their marriage. Dede De Santiago and Mahamed Villa discuss what each individual did throughout the argument that hurt them. The therapist aids Dede De Santiago and Mahamed Villa in processing their feelings and encourage them to discuss and come up with a safe word to use during argument. Mahamed Villa and Dede De Santiago agree to the plan of action. During this session, this clinician used the following therapeutic modalities: Client-centered Therapy, Cognitive Behavioral Therapy, Family Therapy, Solution-Focused Therapy, and Supportive Psychotherapy    Substance Abuse was not addressed during this session. If the client is diagnosed with a co-occurring substance use disorder, please indicate any changes in the frequency or amount of use: N/A.  Stage of change for addressing substance use diagnoses: No substance use/Not applicable    ASSESSMENT:  Eric Antoine presents with a Euthymic/ normal mood. her affect is Normal range and intensity, which is congruent, with her mood and the content of the session. The client has not made progress on their goals. Amanda East and Anabel Santino actively engaged in the session and continue to be goal oriented. Eric Antoine presents with a none risk of suicide, none risk of self-harm, and none risk of harm to others. For any risk assessment that surpasses a "low" rating, a safety plan must be developed. A safety plan was indicated: no  If yes, describe in detail N/A    PLAN: Between sessions, Eric Antoine will develop a safe word to use when arguments become too intense and a break is needed. At the next session, the therapist will use Cognitive Behavioral Therapy, Family Therapy, Solution-Focused Therapy, and Supportive Psychotherapy to address communication issues within their marriage. Behavioral Health Treatment Plan and Discharge Planning: Eric Antoine is aware of and agrees to continue to work on their treatment plan. They have identified and are working toward their discharge goals.  yes    Visit start and stop times:    10/23/23  Start Time: 1100  Stop Time: 1200  Total Visit Time: 60 minutes

## 2023-10-24 ENCOUNTER — HOSPITAL ENCOUNTER (OUTPATIENT)
Dept: INFUSION CENTER | Facility: CLINIC | Age: 28
Discharge: HOME | End: 2023-10-24
Payer: COMMERCIAL

## 2023-10-24 ENCOUNTER — TELEPHONE (OUTPATIENT)
Dept: NEUROLOGY | Facility: CLINIC | Age: 28
End: 2023-10-24

## 2023-10-24 VITALS
OXYGEN SATURATION: 100 % | SYSTOLIC BLOOD PRESSURE: 107 MMHG | TEMPERATURE: 97.6 F | DIASTOLIC BLOOD PRESSURE: 67 MMHG | RESPIRATION RATE: 18 BRPM | HEART RATE: 80 BPM

## 2023-10-24 DIAGNOSIS — G44.86 CERVICOGENIC HEADACHE: Primary | ICD-10-CM

## 2023-10-24 DIAGNOSIS — M32.8 OTHER FORMS OF SYSTEMIC LUPUS ERYTHEMATOSUS, UNSPECIFIED ORGAN INVOLVEMENT STATUS (HCC): Primary | ICD-10-CM

## 2023-10-24 PROCEDURE — 96413 CHEMO IV INFUSION 1 HR: CPT

## 2023-10-24 RX ORDER — ACETAMINOPHEN 325 MG/1
650 TABLET ORAL ONCE
OUTPATIENT
Start: 2023-11-07

## 2023-10-24 RX ORDER — SODIUM CHLORIDE 9 MG/ML
20 INJECTION, SOLUTION INTRAVENOUS ONCE
OUTPATIENT
Start: 2023-11-07

## 2023-10-24 RX ORDER — DIPHENHYDRAMINE HCL 25 MG
25 TABLET ORAL ONCE
OUTPATIENT
Start: 2023-11-07

## 2023-10-24 RX ORDER — SODIUM CHLORIDE 9 MG/ML
20 INJECTION, SOLUTION INTRAVENOUS ONCE
Status: COMPLETED | OUTPATIENT
Start: 2023-10-24 | End: 2023-10-24

## 2023-10-24 RX ORDER — ACETAMINOPHEN 325 MG/1
650 TABLET ORAL ONCE
Status: COMPLETED | OUTPATIENT
Start: 2023-10-24 | End: 2023-10-24

## 2023-10-24 RX ORDER — DIPHENHYDRAMINE HCL 25 MG
25 TABLET ORAL ONCE
Status: COMPLETED | OUTPATIENT
Start: 2023-10-24 | End: 2023-10-24

## 2023-10-24 RX ADMIN — DIPHENHYDRAMINE HYDROCHLORIDE 25 MG: 25 TABLET ORAL at 11:43

## 2023-10-24 RX ADMIN — ACETAMINOPHEN 650 MG: 325 TABLET ORAL at 11:43

## 2023-10-24 RX ADMIN — SODIUM CHLORIDE 20 ML/HR: 0.9 INJECTION, SOLUTION INTRAVENOUS at 12:29

## 2023-10-24 RX ADMIN — BELIMUMAB 600 MG: 120 INJECTION, POWDER, LYOPHILIZED, FOR SOLUTION INTRAVENOUS at 12:29

## 2023-10-24 NOTE — TELEPHONE ENCOUNTER
Eric [facility] (supporting Jayjay Soler MD)6 days ago   Hello,  I wanted to follow up since the last appointment. Since the MRI came out okay, what could’ve caused the symptoms I had? The numbness in my left foot is still significantly worse then it was prior to that dizzy spell and the headaches and neck pain isn’t getting better since.    Thank you,  Amanda East

## 2023-10-24 NOTE — TELEPHONE ENCOUNTER
If tapering Depakote 250 mg with Decadron 2 mg did not provide benefits, either Toradol 60 mg IM + Compazine 10 mg IM in our office or Magnesium sulfate 2gm IV in infusion center advised. Patient has Type 1 Diabetes and Would avoid Solumedrol by all means. Please offer the patient in -office visit for Toradol/Compazine IM if headache is still significant. If patient has just residual numbness - will send natural supplements such as ANGEL and 5-HTP.

## 2023-10-24 NOTE — TELEPHONE ENCOUNTER
Left detailed message for pt making her aware (okay per communication consent). Asked for call back to confirm how she is feeling and how she would like to proceed.

## 2023-10-24 NOTE — TELEPHONE ENCOUNTER
Loise Skiff, MD Maurie Rung days ago     Mrs. Rodrigues -     Considering normal finding of your MRI brain and MRA head means you did not have any permanent damage in your brain, which is great, but my concern is persistent symptoms may still indicate sings of secondary vasculitis. Please proceed with Depakote 250 mg taper and Decadron 2 mg am and if no improvement noted, I will be offering you Solumedrol 1000 mg IV infusion to help with headache and related symptoms. Please reach my office by tomorrow afternoon again with an updates. Thank you,  JUAN Espitia

## 2023-10-24 NOTE — PROGRESS NOTES
Pt here for Benlysta infusion. Pt offered no acute complaints. Is having continued fatigue (not new). Pt denies any recent infections currently not on antibiotics, will be taking in the future related to facial acne, but is talking to Rheum physician first prior to taking.     Vitals stable, pt resting in chair with call bell

## 2023-10-24 NOTE — TELEPHONE ENCOUNTER
10/20/23 12:41pm  Hi, my name is Dimas Dykes number [phone]. I'm calling because Dr. Radha Hein has some prescriptions for me for my symptoms and she wanted me to check in with you guys on an update on one of the prescriptions are still not ready for the I guess the antibiotic or something. Filipe the ones that have, if I already have it and then I had it for not getting better, the numbness on my leg is still there in the past 24-48 hours the numbness on my left hand has returned. So, the headaches and the numbness. if you could call me back with I guess what I should do. So if you could just call me back, I'd really appreciate it. Thank you and have a good weekend.   ___________________________________________

## 2023-10-25 ENCOUNTER — CLINICAL SUPPORT (OUTPATIENT)
Dept: NEUROLOGY | Facility: CLINIC | Age: 28
End: 2023-10-25
Payer: COMMERCIAL

## 2023-10-25 DIAGNOSIS — G44.86 CERVICOGENIC HEADACHE: Primary | ICD-10-CM

## 2023-10-25 PROCEDURE — 96372 THER/PROPH/DIAG INJ SC/IM: CPT | Performed by: PSYCHIATRY & NEUROLOGY

## 2023-10-25 RX ORDER — PROCHLORPERAZINE EDISYLATE 5 MG/ML
10 INJECTION INTRAMUSCULAR; INTRAVENOUS ONCE
Status: COMPLETED | OUTPATIENT
Start: 2023-10-25 | End: 2023-10-25

## 2023-10-25 RX ORDER — KETOROLAC TROMETHAMINE 30 MG/ML
60 INJECTION, SOLUTION INTRAMUSCULAR; INTRAVENOUS ONCE
Status: COMPLETED | OUTPATIENT
Start: 2023-10-25 | End: 2023-10-25

## 2023-10-25 RX ADMIN — KETOROLAC TROMETHAMINE 60 MG: 30 INJECTION, SOLUTION INTRAMUSCULAR; INTRAVENOUS at 15:54

## 2023-10-25 RX ADMIN — PROCHLORPERAZINE EDISYLATE 10 MG: 5 INJECTION INTRAMUSCULAR; INTRAVENOUS at 15:56

## 2023-10-25 NOTE — PROGRESS NOTES
NEPHROLOGY OUTPATIENT CONSULTATION   Rahul Conley 34 y.o. female MRN: [id number]  Date: 10/26/23  Reason for consultation: Hypotension    ASSESSMENT and PLAN:  32-year-old female was seen in nephrology office today for evaluation of hypotension.     # Intermittent episodes of hypotension  - Blood pressure in the office today:  - Supine 111/75, HR 72  - Sitting 111/81, HR 73  - Standing 116/87, HR 78  - No evidence of orthostatic hypotension  - She has history of diabetes which can lead to autonomic dysfunction that can lead to hypotension but she does not have diabetes for long period of time and therefore this would be less likely  - She has history of rheumatologic disease and has been receiving as needed steroids for flareup of her disease which can lead to adrenal insufficiency and a work-up has been started by her endocrinologist  - Diabetic patients can develop hyporeninemic hypoaldosteronism but there is no evidence of hyperkalemia or metabolic acidosis to suggest hyporeninemic hypoaldosteronism  - She had echocardiogram in 03/2022 that showed LVEF of 98%, normal systolic function, normal diastolic function, trace mitral/tricuspid/pulmonic regurgitation  - Her symptoms have started this year and would therefore update echocardiogram to rule out any cardiac abnormalities  - Notes reviewed from endocrinology and agree with their assessment of checking ACTH stimulation test to rule out primary adrenal insufficiency    >>For now  - Increase salt intake  - Increase oral hydration at least 60 to 70 ounces of fluid on a daily basis with incorporation of electrolyte water such as Gatorade  - Would like to hold off on use of midodrine due to extensive side effect profile such as bradycardia and supine hypertension and would like to use conservative measures and try to rule out endocrine causes of hypertension  - Check echocardiogram  - Check aldosterone and renin levels to rule out hypoaldosteronism  - We will discuss with endocrinology regarding suitability of using fludrocortisone in this situation pending results of ACTH stimulation test    # Undifferentiated connective tissue disease  - This was diagnosed based on a positive SOHAM 1 ratio 80 nuclear, speckled, homogenous patterns, arthralgia, malar rash and photosensitivity  - Notes reviewed from rheumatology she is currently on hydroxychloroquine 200 mg daily on the weekdays and 200 mg once daily on the weekend and subcutaneous methotrexate 20 mg once weekly  - Plans were noted to hold off on using steroids for use of flareups and plans were noted to start belimumab infusions 10 mg/kg every 2 weeks for 3 doses followed by 10 mg/kg every 4 weeks    # Type 1 diabetes mellitus  - Most recent HbA1c 8.7 10/2023   - Notes reviewed from endocrinology at [address] [place] [address] Rd noted for continuation of insulin pump  - Discussed importance of good glycemic control in preventing kidney disease    # Renal function  - Baseline creatinine appears to be around 0.7-0.9  - Urinalysis has been intermittently positive for proteinuria negative for hematuria most recently  - Urine albumin to creatinine ratio 6 mg/g 02/2023  - Urine protein to creatinine ratio 40 mg 09/2023  - No current evidence of diabetic nephropathy or renal involvement of connective tissue disease in absence of hematuria or proteinuria   - Check urine albumin to creatinine ratio and urine protein to creatinine ratio to rule out proteinuria    # Cervicogenic headache  - Notes reviewed from neurology regarding management of headache with Decadron 2 mg as needed  - MRI brain recently completed in 10/2023 with no evidence of acute infarction or edema or mass effect, unchanged minimal white matter signal changes compared to prior examinations  - Reviewed notes from neurology and their impression noted regarding no significant pathology    HISTORY OF PRESENT ILLNESS:  Requesting Physician: MARGARET Chakraborty [place] Loya is a 34 y.o. female who was seen in nephrology office today for evaluation of intermittent episodes of dizziness associated with hypotension. This started in February and has been getting more persistent. During episodes of dizziness she notes that her blood pressure is low. Lower systolic blood pressure that she has noticed is 85. Lowest diastolic blood pressure has gone down into 40s. She has never had syncope. She is diabetic since 2019 and is on insulin pump. She has history of undifferentiated connective tissue disease and is currently on Plaquenil. She has had few instances of need for steroid use for flareup of her connective tissue disease. She was switched to belimumab as a steroid sparing agent for her connective tissue disease. >> Medical history evaluation   - Diabetes: Since 2019  - Hypertension: No   - Age ? 56 years: No   - Family history of kidney disease: No   - Obesity or metabolic syndrome: No   - Prior kidney disease or dialysis: No   - Incidental hematuria in the past: No   - Urinary symptoms: Frequency   - History of foamy or frothy urine: Yes at times   - History of nephrolithiasis: No   - Systemic diseases that might affect kidney: ? Lupus   - History of use of medications that might affect renal function: Occasional NSAID use     PAST MEDICAL HISTORY:  Past Medical History:   Diagnosis Date    Abdominal pain     Anaphylaxis     Anxiety     Anxiety and depression     COVID-19 12/2020    Delayed emergence from anesthesia 03/23/2023    Severe episode of emergence delirium (confused, combative) after MAC anesthetic on 03/2023 for EGD. Received versed 2mg, >50mcg precedex, 5mg IV haldol, and 50mcg fentanyl. Waxing and waning episodes of agitation. Any future anesthetics should NOT be done at [facility].     Delirium, induced by drug     anesthesia    Depression     Diabetes mellitus ([address])     Disease of thyroid gland     Hashimoto    DKA, type 1 ([address]) 05/11/2021    Eating disorder history of anorexia/bulemia 0445-5295    Food intolerance     Fracture of fibula     R Salter I    Alise Malling disease     Hashimoto's disease 02/21/2020    Head injury     Headache(784.0)     Nasal congestion     PTSD (post-traumatic stress disorder)     Rectal bleed     Seizures (HCC)     Type 1 diabetes ([address])        PAST SURGICAL HISTORY:  Past Surgical History:   Procedure Laterality Date    COLONOSCOPY      KNEE SURGERY Right 07/06/2020    NASAL SEPTOPLASTY W/ TURBINOPLASTY      SINUS SURGERY      SKIN BIOPSY      TURBINOPLASTY N/A 3/22/2021    Procedure: Jeremias Bodo;  Surgeon: Britney Encarnacion MD;  Location: [facility];  Service: ENT    UPPER GASTROINTESTINAL ENDOSCOPY      WISDOM TOOTH EXTRACTION      WRIST SURGERY      left;  Excision of ganglion       ALLERGIES:  Allergies   Allergen Reactions    Insulin Glargine Other (See Comments)     LANTUS BRAND -Burning and redness under skin        SOCIAL HISTORY:  Social History     Substance and Sexual Activity   Alcohol Use Yes    Comment: rarely     Social History     Substance and Sexual Activity   Drug Use Never     Social History     Tobacco Use   Smoking Status Never    Passive exposure: Never   Smokeless Tobacco Never   Tobacco Comments    Tobacco smoke exposure (Father smokes cigars)       FAMILY HISTORY:  Family History   Problem Relation Age of Onset    Hypertension Mother     Migraines Mother         Headache    Diabetes type II Mother     Varicose Veins Mother     Hyperlipidemia Mother     Diabetes Mother     Arthritis Mother     Depression Mother     Hearing loss Mother     Anxiety disorder Mother     Eczema Father     Cholelithiasis Father     Hypertension Father     Sarcoidosis Father         Liver    Hyperlipidemia Father     Diabetes Father     Coronary artery disease Father     Nephrolithiasis Father     Cirrhosis Father     Alcohol abuse Father     Thyroid disease Sister     Hashimoto's thyroiditis Sister     Alcohol abuse Brother     Cancer Family     Diabetes Family     Hypertension Family        MEDICATIONS:    Current Outpatient Medications:     aspirin (ECOTRIN LOW STRENGTH) 81 mg EC tablet, Take 162 mg by mouth 2 (two) times a day as needed for headaches, Disp: , Rfl:     Azelastine HCl 137 MCG/SPRAY SOLN, 2 SPRAYS INTO EACH NOSTRIL 2 (TWO) TIMES A DAY AS NEEDED FOR RHINITIS USE IN EACH NOSTRIL AS DIRECTED, Disp: 90 mL, Rfl: 0    BD Insulin Syringe U/F 31G X 5/16" 0.5 ML MISC, Use as directly with weekly methotrexate injection. , Disp: 100 each, Rfl: 0    Belimumab (BENLYSTA IV), Inject into a catheter in a vein every 14 (fourteen) days Switching to monthly on 12/5, Disp: , Rfl:     clindamycin (CLEOCIN T) 1 % lotion, Apply 1 application. topically 2 (two) times a day, Disp: , Rfl:     folic acid (FOLVITE) 1 mg tablet, Take 1 tablet (1 mg total) by mouth daily, Disp: 90 tablet, Rfl: 3    gabapentin (Neurontin) 300 mg capsule, Take 1 capsule (300 mg total) by mouth daily at bedtime PRN, Disp: 90 capsule, Rfl: 3    glucagon 1 MG injection, 1 mg, Disp: , Rfl:     Glucagon HCl (Glucagon Emergency) 1 MG/ML SOLR, INJECT 1 MG AS DIRECTED AS NEEDED (WHEN PASSED OUT FROM LOW BLOOD SUGAR). , Disp: , Rfl:     hydroxychloroquine (PLAQUENIL) 200 mg tablet, Take 1 tablet (200 mg total) by mouth 2 (two) times a day with meals, Disp: 180 tablet, Rfl: 3    Insulin Disposable Pump (Omnipod 5 G6 Pod, Gen 5,) MISC, INJECT 1 EACH UNDER THE SKIN EVERY THIRD DAY. E10.65, Disp: , Rfl:     Insulin Pen Needle (BD PEN NEEDLE NASIM U/F) 32G X 4 MM MISC, by Does not apply route 4 (four) times a day for 180 days, Disp: 400 each, Rfl: 3    ketoconazole (NIZORAL) 2 % shampoo, Apply topically once, Disp: , Rfl:     levonorgestrel-ethinyl estradiol (Altavera) 0.15-30 MG-MCG per tablet, Take 1 tablet by mouth daily, Disp: 84 tablet, Rfl: 3    levothyroxine 25 mcg tablet, Take 1 tablet (25 mcg total) by mouth daily, Disp: 60 tablet, Rfl: 0    LORazepam (Ativan) 0.5 mg tablet, Take 1 tablet (0.5 mg total) by mouth 2 (two) times a day as needed for anxiety, Disp: 60 tablet, Rfl: 0    methotrexate 50 MG/2ML injection, INJECT 0.8 ML UNDER THE SKIN ONCE EVERY 7 DAYS.  DISCARD UNUSED REMAINDER 30 DAYS AFTER INITIAL USE., Disp: 4 mL, Rfl: 5    miconazole 2 % cream, Apply 1 Application topically 2 (two) times a day To affected area, Disp: , Rfl:     NovoLOG 100 UNIT/ML injection, INJECT 3 TIMES A DAY WITH MEALS BASED ON ICR 4 AND ISF 30 FOR TDD 90 UNITS DAILY VIA INSULIN PUMP, Disp: , Rfl:     OneTouch Verio test strip, USE 4 TIMES A DAY BEFORE MEALS AND AT BEDTIME, Disp: , Rfl:     pancrelipase, Lip-Prot-Amyl, (CREON) 12,000 units capsule, Take 36,000 units of lipase by mouth 3 (three) times a day with meals Take 3 capsules prior to each meal and 1 prior to snack, Disp: 120 capsule, Rfl: 3    Tresiba FlexTouch 100 units/mL injection pen, INJECT 34 UNITS DAILY IN THE EVENT OF PUMP FAILURE E10.65, Disp: , Rfl:     tretinoin (RETIN-A) 0.025 % cream, Apply topically daily at bedtime, Disp: , Rfl:     zolpidem (AMBIEN) 10 mg tablet, Take 1 tablet (10 mg total) by mouth daily at bedtime as needed for sleep, Disp: 30 tablet, Rfl: 0    azithromycin (ZITHROMAX) 250 mg tablet, , Disp: , Rfl:     dexamethasone (DECADRON) 2 mg tablet, Take 1 tablet (2 mg total) by mouth daily as needed (headache) (Patient not taking: Reported on 10/26/2023), Disp: 5 tablet, Rfl: 2    dexamethasone (DECADRON) 2 mg tablet, Take 1 tablet (2 mg total) by mouth daily as needed (headache) (Patient not taking: Reported on 10/26/2023), Disp: 5 tablet, Rfl: 2    divalproex sodium (Depakote ER) 250 mg 24 hr tablet, Take 2 tabs for 3 days, then 1 tab for 2 days (Patient not taking: Reported on 10/26/2023), Disp: 8 tablet, Rfl: 1    HYDROcodone-acetaminophen (NORCO) 5-325 mg per tablet, Take 1 tablet by mouth every 6 (six) hours as needed for pain (Patient not taking: Reported on 10/26/2023), Disp: , Rfl:     promethazine (PHENERGAN) 12.5 MG tablet, Take 1 tablet (12.5 mg total) by mouth every 6 (six) hours as needed for nausea or vomiting (Patient not taking: Reported on 10/4/2023), Disp: 30 tablet, Rfl: 0  No current facility-administered medications for this visit. Facility-Administered Medications Ordered in Other Visits:     alteplase (CATHFLO) injection 2 mg, 2 mg, Intracatheter, Q1MIN PRN, Sofi Chung MD    REVIEW OF SYSTEMS:  Review of Systems   Constitutional:  Negative for chills and fever. HENT:  Negative for ear pain and sore throat. Eyes:  Negative for pain and visual disturbance. Respiratory:  Negative for cough and shortness of breath. Cardiovascular:  Negative for chest pain and palpitations. Gastrointestinal:  Negative for abdominal pain and vomiting. Genitourinary:  Negative for dysuria and hematuria. Musculoskeletal:  Negative for arthralgias and back pain. Skin:  Negative for color change and rash. Neurological:  Negative for seizures and syncope. All other systems reviewed and are negative. All the systems were reviewed and were negative except as documented on the HPI. PHYSICAL EXAMINATION:  /81   Pulse 73   Ht 5' 2" (1.575 m)   Wt 61.8 kg (136 lb 3.2 oz)   BMI 24.91 kg/m²   Current Weight: Weight - Scale: 61.8 kg (136 lb 3.2 oz) Body mass index is 24.91 kg/m². Physical Exam  Constitutional:       Appearance: Normal appearance. HENT:      Head: Normocephalic and atraumatic. Mouth/Throat:      Mouth: Mucous membranes are moist.      Pharynx: Oropharynx is clear. Cardiovascular:      Rate and Rhythm: Normal rate and regular rhythm. Pulses: Normal pulses. Heart sounds: Normal heart sounds. Pulmonary:      Effort: Pulmonary effort is normal.      Breath sounds: Normal breath sounds. Abdominal:      General: Bowel sounds are normal.      Palpations: Abdomen is soft. Musculoskeletal:         General: Normal range of motion. Right lower leg: No edema.       Left lower leg: No edema. Skin:     General: Skin is warm and dry. Neurological:      General: No focal deficit present. Mental Status: She is alert and oriented to person, place, and time. Mental status is at baseline. Psychiatric:         Mood and Affect: Mood normal.         Behavior: Behavior normal.         Thought Content:  Thought content normal.         Judgment: Judgment normal.         LABORATORY RESULTS:  Lab Results   Component Value Date    K 3.6 09/05/2023     09/05/2023    CO2 26 09/05/2023    BUN 12 09/05/2023    CREATININE 0.81 09/05/2023    GLUCOSE 342 (H) 01/24/2019    GLUF 133 (H) 07/20/2023    CALCIUM 9.6 09/05/2023    AST 14 09/05/2023    ALT 11 09/05/2023    ALKPHOS 53 09/05/2023    EGFR 100 09/05/2023     Lab Results   Component Value Date    WBC 9.11 09/05/2023    HGB 12.6 09/05/2023    HCT 41.2 09/05/2023    MCV 89 09/05/2023     09/05/2023     Lab Results   Component Value Date    CALCIUM 9.6 09/05/2023    PHOS 2.1 (L) 09/26/2022

## 2023-10-25 NOTE — TELEPHONE ENCOUNTER
Pt reports continued intermittent tingling-numbness-heaviness (symptoms occur in this order) on left side of body. Left leg numbness (extreme numbness on foot); headache (4/10 pain scale);  left eye "feels different" -hard to describe; possibly slow in movement; no visual changes. Pt agreeable to Toradol 60 mg IM + Compazine 10 mg IM in office. Patient will go to [facility] location. Appt booked for 10/25/23 3pm.     Estefania hampton to injection(s)    Jose/Serene hampton to add-on    Dr. Vashti Meckel - please place orders for Toradol and Compazine. Thank you!

## 2023-10-25 NOTE — TELEPHONE ENCOUNTER
Patient left voicemail requesting call back to further discuss continued symptoms: headaches continue, numbness to legs, left hand and arm still numb and heavy.      [phone]

## 2023-10-26 ENCOUNTER — CONSULT (OUTPATIENT)
Dept: NEPHROLOGY | Facility: CLINIC | Age: 28
End: 2023-10-26
Payer: COMMERCIAL

## 2023-10-26 VITALS
HEART RATE: 73 BPM | WEIGHT: 136.2 LBS | HEIGHT: 62 IN | BODY MASS INDEX: 25.06 KG/M2 | SYSTOLIC BLOOD PRESSURE: 111 MMHG | DIASTOLIC BLOOD PRESSURE: 81 MMHG

## 2023-10-26 DIAGNOSIS — M35.9 UNDIFFERENTIATED CONNECTIVE TISSUE DISEASE (HCC): Primary | ICD-10-CM

## 2023-10-26 DIAGNOSIS — E10.9 TYPE 1 DIABETES MELLITUS WITHOUT COMPLICATION (HCC): ICD-10-CM

## 2023-10-26 DIAGNOSIS — I95.9 HYPOTENSION, UNSPECIFIED HYPOTENSION TYPE: ICD-10-CM

## 2023-10-26 LAB
SL AMB  POCT GLUCOSE, UA: NORMAL
SL AMB LEUKOCYTE ESTERASE,UA: NORMAL
SL AMB POCT BILIRUBIN,UA: NORMAL
SL AMB POCT BLOOD,UA: NORMAL
SL AMB POCT CLARITY,UA: CLEAR
SL AMB POCT COLOR,UA: YELLOW
SL AMB POCT KETONES,UA: NORMAL
SL AMB POCT NITRITE,UA: NORMAL
SL AMB POCT PH,UA: 8
SL AMB POCT SPECIFIC GRAVITY,UA: 1.01
SL AMB POCT URINE PROTEIN: 15
SL AMB POCT UROBILINOGEN: 0.2

## 2023-10-26 PROCEDURE — 99204 OFFICE O/P NEW MOD 45 MIN: CPT | Performed by: INTERNAL MEDICINE

## 2023-10-26 PROCEDURE — 81002 URINALYSIS NONAUTO W/O SCOPE: CPT | Performed by: INTERNAL MEDICINE

## 2023-10-26 RX ORDER — CLINDAMYCIN PHOSPHATE 10 UG/ML
1 LOTION TOPICAL 2 TIMES DAILY
COMMUNITY
Start: 2023-10-17

## 2023-10-26 RX ORDER — KETOCONAZOLE 20 MG/ML
SHAMPOO TOPICAL ONCE
COMMUNITY
Start: 2023-10-17

## 2023-10-26 RX ORDER — ASPIRIN 81 MG/1
162 TABLET ORAL 2 TIMES DAILY PRN
COMMUNITY

## 2023-10-26 NOTE — PATIENT INSTRUCTIONS
You were evaluated for low blood pressure today. Your blood pressure was well controlled in the office today. We will start with further work-up including checking some hormone levels and doing an ultrasound of your heart for further evaluation of low blood pressure. No new medications are being added at this stage. We will reach out to your endocrinologist regarding a medication that can be used but we will reserve that for future use.

## 2023-10-27 ENCOUNTER — SOCIAL WORK (OUTPATIENT)
Dept: BEHAVIORAL/MENTAL HEALTH CLINIC | Facility: CLINIC | Age: 28
End: 2023-10-27
Payer: COMMERCIAL

## 2023-10-27 ENCOUNTER — LAB (OUTPATIENT)
Dept: LAB | Age: 28
End: 2023-10-27
Payer: COMMERCIAL

## 2023-10-27 DIAGNOSIS — I95.9 HYPOTENSION, UNSPECIFIED HYPOTENSION TYPE: ICD-10-CM

## 2023-10-27 DIAGNOSIS — F43.12 CHRONIC POST-TRAUMATIC STRESS DISORDER (PTSD): ICD-10-CM

## 2023-10-27 DIAGNOSIS — F41.0 GENERALIZED ANXIETY DISORDER WITH PANIC ATTACKS: ICD-10-CM

## 2023-10-27 DIAGNOSIS — F41.1 GENERALIZED ANXIETY DISORDER WITH PANIC ATTACKS: ICD-10-CM

## 2023-10-27 DIAGNOSIS — F31.62 BIPOLAR DISORDER, CURRENT EPISODE MIXED, MODERATE (HCC): ICD-10-CM

## 2023-10-27 DIAGNOSIS — F42.2 MIXED OBSESSIONAL THOUGHTS AND ACTS: Primary | ICD-10-CM

## 2023-10-27 DIAGNOSIS — M35.9 UNDIFFERENTIATED CONNECTIVE TISSUE DISEASE (HCC): ICD-10-CM

## 2023-10-27 LAB
ALBUMIN SERPL BCP-MCNC: 4.2 G/DL (ref 3.5–5)
ANION GAP SERPL CALCULATED.3IONS-SCNC: 10 MMOL/L
BACTERIA UR QL AUTO: NORMAL /HPF
BILIRUB UR QL STRIP: NEGATIVE
BUN SERPL-MCNC: 13 MG/DL (ref 5–25)
CALCIUM SERPL-MCNC: 9.2 MG/DL (ref 8.4–10.2)
CHLORIDE SERPL-SCNC: 103 MMOL/L (ref 96–108)
CLARITY UR: CLEAR
CO2 SERPL-SCNC: 27 MMOL/L (ref 21–32)
COLOR UR: COLORLESS
CREAT SERPL-MCNC: 0.76 MG/DL (ref 0.6–1.3)
CREAT UR-MCNC: 42.1 MG/DL
CREAT UR-MCNC: 42.1 MG/DL
DEPRECATED S PNEUM14 IGG SER-MCNC: 2.7 UG/ML
DEPRECATED S PNEUM19 IGG SER-MCNC: 2.6 UG/ML
DEPRECATED S PNEUM23 IGG SER-MCNC: 3.4 UG/ML
DEPRECATED S PNEUM3 IGG SER-MCNC: 0.4 UG/ML
DEPRECATED S PNEUM4 IGG SER-MCNC: <0.1 UG/ML
DEPRECATED S PNEUM8 AB SER-MCNC: 9.4 UG/ML
DEPRECATED S PNEUM9 IGG SER-MCNC: 12.6 UG/ML
FLUBV RNA SPEC QL NAA+PROBE: 3.1 UG/ML
GFR SERPL CREATININE-BSD FRML MDRD: 108 ML/MIN/1.73SQ M
GLUCOSE SERPL-MCNC: 96 MG/DL (ref 65–140)
GLUCOSE UR STRIP-MCNC: NEGATIVE MG/DL
HGB UR QL STRIP.AUTO: NEGATIVE
KETONES UR STRIP-MCNC: NEGATIVE MG/DL
LEUKOCYTE ESTERASE UR QL STRIP: NEGATIVE
MICROALBUMIN UR-MCNC: <7 MG/L
MICROALBUMIN/CREAT 24H UR: <17 MG/G CREATININE (ref 0–30)
NITRITE UR QL STRIP: NEGATIVE
NON-SQ EPI CELLS URNS QL MICRO: NORMAL /HPF
PH UR STRIP.AUTO: 6.5 [PH]
PHOSPHATE SERPL-MCNC: 4.3 MG/DL (ref 2.7–4.5)
POTASSIUM SERPL-SCNC: 4 MMOL/L (ref 3.5–5.3)
PROT UR STRIP-MCNC: NEGATIVE MG/DL
PROT UR-MCNC: <4 MG/DL
RBC #/AREA URNS AUTO: NORMAL /HPF
S PNEUM DA 18C IGG SER-MCNC: 0.3 UG/ML
S PNEUM DA 19A IGG SER-MCNC: 1.4 UG/ML
S PNEUM DA 6B IGG SER-MCNC: 0.5 UG/ML
SODIUM SERPL-SCNC: 140 MMOL/L (ref 135–147)
SP GR UR STRIP.AUTO: 1.01 (ref 1–1.03)
STREP PNEUMO TYPE 1: 1.1 UG/ML
STREP PNEUMO TYPE 51: 0.4 UG/ML
STREP PNEUMO TYPE 68: 3.1 UG/ML
UROBILINOGEN UR STRIP-ACNC: <2 MG/DL
WBC #/AREA URNS AUTO: NORMAL /HPF

## 2023-10-27 PROCEDURE — 84244 ASSAY OF RENIN: CPT

## 2023-10-27 PROCEDURE — 82570 ASSAY OF URINE CREATININE: CPT | Performed by: INTERNAL MEDICINE

## 2023-10-27 PROCEDURE — 81001 URINALYSIS AUTO W/SCOPE: CPT | Performed by: INTERNAL MEDICINE

## 2023-10-27 PROCEDURE — 90834 PSYTX W PT 45 MINUTES: CPT | Performed by: SOCIAL WORKER

## 2023-10-27 PROCEDURE — 36415 COLL VENOUS BLD VENIPUNCTURE: CPT

## 2023-10-27 PROCEDURE — 80069 RENAL FUNCTION PANEL: CPT

## 2023-10-27 PROCEDURE — 82043 UR ALBUMIN QUANTITATIVE: CPT | Performed by: INTERNAL MEDICINE

## 2023-10-27 PROCEDURE — 82088 ASSAY OF ALDOSTERONE: CPT

## 2023-10-27 PROCEDURE — 84156 ASSAY OF PROTEIN URINE: CPT | Performed by: INTERNAL MEDICINE

## 2023-10-27 NOTE — PSYCH
Behavioral Health Psychotherapy Progress Note    Psychotherapy Provided: Individual Psychotherapy     1. Mixed obsessional thoughts and acts        2. Generalized anxiety disorder with panic attacks        3. Chronic post-traumatic stress disorder (PTSD)        4. Bipolar disorder, current episode mixed, moderate ([address])            Goals addressed in session: Goal 1 and Goal 2     DATA:     Met with Jenny individually. Session focused upon changing Endo to [facility] - for more continuity of care. Planning on [facility] - as recommended by fertility clinic. 'I feel I have more of a voice for myslef now regarding my illnesses and what my body needs'. Praised for this insight. Processed recent email León Johnston sent of topics for today's session - 'being bullied' by family - Polina Chavez continues to notice and becoming even more frustrated - feels validated 'that I'm not crazy and blowing things out of proportion'. León Johnston feels confident in her improved boundary setting with all of them. Specific examples of circumstances surrounding family. Multiple stressors from STD - transitioning to [employer] Denied SI    During this session, this clinician used the following therapeutic modalities: Client-centered Therapy, Cognitive Behavioral Therapy, Cognitive Processing Therapy, and Supportive Psychotherapy    Substance Abuse was not addressed during this session. If the client is diagnosed with a co-occurring substance use disorder, please indicate any changes in the frequency or amount of use: . Stage of change for addressing substance use diagnoses: No substance use/Not applicable    ASSESSMENT:  Koko Templeton presents with a Dysthymic mood. her affect is Normal range and intensity, which is congruent, with her mood and the content of the session. The client has made progress on their goals. Koko Templeton presents with a low risk of suicide, low risk of self-harm, and low risk of harm to others.     For any risk assessment that surpasses a "low" rating, a safety plan must be developed. A safety plan was indicated: yes  If yes, describe in detail     PLAN: Between sessions, Dennis Denis will continue couples counseling, continuing setting boundaries . At the next session, the therapist will use Client-centered Therapy, Cognitive Behavioral Therapy, Cognitive Processing Therapy, and Supportive Psychotherapy to address health, boundary setting with family and marriage/communication . Behavioral Health Treatment Plan and Discharge Planning: Dennis Denis is aware of and agrees to continue to work on their treatment plan. They have identified and are working toward their discharge goals.  yes    Visit start and stop times:    10/27/23  Start Time: 1400  Stop Time: 1450  Total Visit Time: 50 minutes

## 2023-11-01 ENCOUNTER — TELEMEDICINE (OUTPATIENT)
Dept: PSYCHIATRY | Facility: CLINIC | Age: 28
End: 2023-11-01
Payer: COMMERCIAL

## 2023-11-01 ENCOUNTER — TELEPHONE (OUTPATIENT)
Age: 28
End: 2023-11-01

## 2023-11-01 DIAGNOSIS — F41.1 GENERALIZED ANXIETY DISORDER WITH PANIC ATTACKS: ICD-10-CM

## 2023-11-01 DIAGNOSIS — F31.62 BIPOLAR DISORDER, CURRENT EPISODE MIXED, MODERATE (HCC): Primary | ICD-10-CM

## 2023-11-01 DIAGNOSIS — F43.12 CHRONIC POST-TRAUMATIC STRESS DISORDER (PTSD): ICD-10-CM

## 2023-11-01 DIAGNOSIS — F42.2 MIXED OBSESSIONAL THOUGHTS AND ACTS: ICD-10-CM

## 2023-11-01 DIAGNOSIS — F41.0 GENERALIZED ANXIETY DISORDER WITH PANIC ATTACKS: ICD-10-CM

## 2023-11-01 PROBLEM — E10.65 UNCONTROLLED TYPE 1 DIABETES MELLITUS WITH HYPERGLYCEMIA (HCC): Status: ACTIVE | Noted: 2021-07-17

## 2023-11-01 PROCEDURE — 90833 PSYTX W PT W E/M 30 MIN: CPT | Performed by: PSYCHIATRY & NEUROLOGY

## 2023-11-01 PROCEDURE — 99213 OFFICE O/P EST LOW 20 MIN: CPT | Performed by: PSYCHIATRY & NEUROLOGY

## 2023-11-01 NOTE — PSYCH
Virtual Regular Visit    Verification of patient location:    Patient is located at Home in the following state in which I hold an active license [place]      Assessment/Plan:    Problem List Items Addressed This Visit          Other    Bipolar affective disorder ([address]) - Primary    Chronic post-traumatic stress disorder (PTSD)    Generalized anxiety disorder with panic attacks    OCD (obsessive compulsive disorder)            Reason for visit is   No chief complaint on file. Encounter provider Sabina Latham MD    Provider located at [address] [place] [id number] [phone]      Recent Visits  No visits were found meeting these conditions. Showing recent visits within past 7 days and meeting all other requirements  Today's Visits  Date Type Provider Dept   11/01/23 Telemedicine Sabina Latham MD [facility] today's visits and meeting all other requirements  Future Appointments  No visits were found meeting these conditions. Showing future appointments within next 150 days and meeting all other requirements       The patient was identified by name and date of birth. Dennis Denis was informed that this is a telemedicine visit and that the visit is being conducted "[employer]. She agrees to proceed. .  My office door was closed. No one else was in the room. She acknowledged consent and understanding of privacy and security of the video platform. The patient has agreed to participate and understands they can discontinue the visit at any time. Patient is aware this is a billable service. Subjective  Dennis Denis is a 34 y.o. female with Bipolar do  . Since last seen Lobo Caro stated she still struggles regulating her emotions and feels anxious often.    She stated she will not start a mood stabilizer because she did not have a good response in the past and she felt she had the same struggles with her mood even when she was taking medications. She tried Sertraline but she experienced increased mood lability and SI and she stopped the medication after 3 weeks. She feels her mood dysregulation is related to her Systemic Lupus diagnosis and that is certainly possible. She stated that after she started working the amount of stress has caused her physical and emotional health to decline. She applied for STD and it was approved. She stated the stress has caused her to oversleep and she has problems with attention and concentration and was making multiple mistakes at work. She is considering either STD to LTD or switching to part time employment but she needs medical benefits due to her multiple health issues. She continues to meet with her counselor on a regular basis. She will be starting infusions once every 2 week for 6 weeks then once a month, and medication is called Benlysta. She switched Clonazepam to Lorazepam because she needs a medication she can use as needed but that also acts fast when she experiences episodes of panic. She is reporting raising thoughts and insomnia but denies manic other symptoms. She also started  Zolpidem 10 mg po qhs prn. She feels Lorazepam works better than Clonazepam and will like to continue current treatment as prescribed. Will research treatment options for SLE related mood disorders. Schedule follow up in 6 weeks. HPI     Past Medical History:   Diagnosis Date    Abdominal pain     Anaphylaxis     Anxiety     Anxiety and depression     COVID-19 12/2020    Delayed emergence from anesthesia 03/23/2023    Severe episode of emergence delirium (confused, combative) after MAC anesthetic on 03/2023 for EGD. Received versed 2mg, >50mcg precedex, 5mg IV haldol, and 50mcg fentanyl. Waxing and waning episodes of agitation. Any future anesthetics should NOT be done at [facility].     Delirium, induced by drug     anesthesia Depression     Diabetes mellitus ([facility])     Disease of thyroid gland     Hashimoto    DKA, type 1 ([facility]) 05/11/2021    Eating disorder     history of anorexia/bulemia [id number]    Food intolerance     Fracture of fibula     R Salter I    Gilbert disease     Hashimoto's disease 02/21/2020    Head injury     Headache(784.0)     Nasal congestion     PTSD (post-traumatic stress disorder)     Rectal bleed     Seizures (HCC)     Type 1 diabetes ([facility])        Past Surgical History:   Procedure Laterality Date    COLONOSCOPY      KNEE SURGERY Right 07/06/2020    NASAL SEPTOPLASTY W/ TURBINOPLASTY      SINUS SURGERY      SKIN BIOPSY      TURBINOPLASTY N/A 3/22/2021    Procedure: Cindy Gamble;  Surgeon: Preston Coleman MD;  Location: [facility];  Service: ENT    UPPER GASTROINTESTINAL ENDOSCOPY      WISDOM TOOTH EXTRACTION      WRIST SURGERY      left; Excision of ganglion       Current Outpatient Medications   Medication Sig Dispense Refill    aspirin (ECOTRIN LOW STRENGTH) 81 mg EC tablet Take 162 mg by mouth 2 (two) times a day as needed for headaches      Azelastine HCl 137 MCG/SPRAY SOLN 2 SPRAYS INTO EACH NOSTRIL 2 (TWO) TIMES A DAY AS NEEDED FOR RHINITIS USE IN EACH NOSTRIL AS DIRECTED 90 mL 0    BD Insulin Syringe U/F 31G X 5/16" 0.5 ML MISC Use as directly with weekly methotrexate injection. 100 each 0    Belimumab (BENLYSTA IV) Inject into a catheter in a vein every 14 (fourteen) days Switching to monthly on 12/5      clindamycin (CLEOCIN T) 1 % lotion Apply 1 application. topically 2 (two) times a day      folic acid (FOLVITE) 1 mg tablet Take 1 tablet (1 mg total) by mouth daily 90 tablet 3    gabapentin (Neurontin) 300 mg capsule Take 1 capsule (300 mg total) by mouth daily at bedtime PRN 90 capsule 3    glucagon 1 MG injection 1 mg      Glucagon HCl (Glucagon Emergency) 1 MG/ML SOLR INJECT 1 MG AS DIRECTED AS NEEDED (WHEN PASSED OUT FROM LOW BLOOD SUGAR).       hydroxychloroquine (PLAQUENIL) 200 mg tablet Take 1 tablet (200 mg total) by mouth 2 (two) times a day with meals 180 tablet 3    Insulin Disposable Pump (Omnipod 5 G6 Pod, Gen 5,) MISC INJECT 1 EACH UNDER THE SKIN EVERY THIRD DAY. E10.65      Insulin Pen Needle (BD PEN NEEDLE ANSIM U/F) 32G X 4 MM MISC by Does not apply route 4 (four) times a day for 180 days 400 each 3    ketoconazole (NIZORAL) 2 % shampoo Apply topically once      levonorgestrel-ethinyl estradiol (Altavera) 0.15-30 MG-MCG per tablet Take 1 tablet by mouth daily 84 tablet 3    levothyroxine 25 mcg tablet Take 1 tablet (25 mcg total) by mouth daily 60 tablet 0    LORazepam (Ativan) 0.5 mg tablet Take 1 tablet (0.5 mg total) by mouth 2 (two) times a day as needed for anxiety 60 tablet 0    methotrexate 50 MG/2ML injection INJECT 0.8 ML UNDER THE SKIN ONCE EVERY 7 DAYS. DISCARD UNUSED REMAINDER 30 DAYS AFTER INITIAL USE. 4 mL 5    miconazole 2 % cream Apply 1 Application topically 2 (two) times a day To affected area      NovoLOG 100 UNIT/ML injection INJECT 3 TIMES A DAY WITH MEALS BASED ON ICR 4 AND ISF 30 FOR TDD 90 UNITS DAILY VIA INSULIN PUMP      OneTouch Verio test strip USE 4 TIMES A DAY BEFORE MEALS AND AT BEDTIME      pancrelipase, Lip-Prot-Amyl, (CREON) 12,000 units capsule Take 36,000 units of lipase by mouth 3 (three) times a day with meals Take 3 capsules prior to each meal and 1 prior to snack 120 capsule 3    Tresiba FlexTouch 100 units/mL injection pen INJECT 34 UNITS DAILY IN THE EVENT OF PUMP FAILURE E10.65      tretinoin (RETIN-A) 0.025 % cream Apply topically daily at bedtime      zolpidem (AMBIEN) 10 mg tablet Take 1 tablet (10 mg total) by mouth daily at bedtime as needed for sleep 30 tablet 0     No current facility-administered medications for this visit.         Allergies   Allergen Reactions    Insulin Glargine Other (See Comments)     LANTUS BRAND -Burning and redness under skin        Review of Systems      Mood Anxiety, Depression and Emotional Lability   Behavior Impulsive Behavior   Thought Content Disturbing Thoughts, Feelings   General Emotional Problems and Decreased Functioning   Personality Normal   Other Psych Symptoms Normal   Constitutional Negative   ENT Negative   Cardiovascular Negative   Respiratory Negative   Gastrointestinal Negative   Genitourinary Negative   Musculoskeletal Negative   Integumentary Negative   Neurological Negative   Endocrine Normal    Other Symptoms Normal        Laboratory Results:   Recent Labs (last 12 months):   Appointment on 10/27/2023   Component Date Value    Albumin 10/27/2023 4.2     Calcium 10/27/2023 9.2     Phosphorus 10/27/2023 4.3     Glucose 10/27/2023 96     BUN 10/27/2023 13     Creatinine 10/27/2023 0.76     Sodium 10/27/2023 140     Potassium 10/27/2023 4.0     Chloride 10/27/2023 103     CO2 10/27/2023 27     ANION GAP 10/27/2023 10     eGFR 10/27/2023 108    Consult on 10/26/2023   Component Date Value    Creatinine, Ur 10/27/2023 42.1     Protein Urine Random 10/27/2023 <4     Prot/Creat Ratio, Ur 10/27/2023      Creatinine, Ur 10/27/2023 42.1     Albumin,U,Random 10/27/2023 <7.0     Albumin Creat Ratio 10/27/2023 <17     Color, UA 10/27/2023 Colorless     Clarity, UA 10/27/2023 Clear     Specific Gravity, UA 10/27/2023 1.011     pH, UA 10/27/2023 6.5     Leukocytes, UA 10/27/2023 Negative     Nitrite, UA 10/27/2023 Negative     Protein, UA 10/27/2023 Negative     Glucose, UA 10/27/2023 Negative     Ketones, UA 10/27/2023 Negative     Urobilinogen, UA 10/27/2023 <2.0     Bilirubin, UA 10/27/2023 Negative     Occult Blood, UA 10/27/2023 Negative     RBC, UA 10/27/2023 None Seen     WBC, UA 10/27/2023 1-2     Epithelial Cells 10/27/2023 Occasional     Bacteria, UA 10/27/2023 None Seen     LEUKOCYTE ESTERASE,UA 10/26/2023 70+     NITRITE,UA 10/26/2023 -     SL AMB POCT UROBILINOGEN 10/26/2023 0.2     POCT URINE PROTEIN 10/26/2023 15      PH,UA 10/26/2023 8.0     BLOOD,UA 10/26/2023 -     SPECIFIC GRAVITY,UA 10/26/2023 1.015     KETONES,UA 10/26/2023 -     BILIRUBIN,UA 10/26/2023 -     GLUCOSE, UA 10/26/2023 -      COLOR,UA 10/26/2023 yellow     CLARITY,UA 10/26/2023 clear    Appointment on 10/20/2023   Component Date Value    Hemoglobin A1C 10/20/2023 8.7 (H)     EAG 10/20/2023 203     TSH 3RD GENERATON 10/20/2023 0.926     STREP PNEUMO TYPE1 10/20/2023 1.1 (L)     Pneumococcal antibody Ty* 10/20/2023 0.4 (L)     Strep pneumo Type 4 10/20/2023 <0.1 (L)     Pneumococcal antibody Ty* 10/20/2023 9.4     Strep pneumo Type 9 10/20/2023 12.6     Strep pneumo Type 12 10/20/2023 3.1     Strep pneumo Type 14 10/20/2023 2.7     Strep pneumo Type 19 10/20/2023 2.6     Pneumococcal antibody Ty* 10/20/2023 3.4     Strep pneumo Type 26 10/20/2023 0.5 (L)     Strep pneumo Type 51 10/20/2023 0.4 (L)     Strep pneumo Type 56 10/20/2023 0.3 (L)     Strep pneumo Type 57 10/20/2023 1.4     Strep pneumo Type 68 10/20/2023 3.1    Appointment on 09/11/2023   Component Date Value    QFT Nil 09/11/2023 0.04     QFT TB1-NIL 09/11/2023 0.00     QFT TB2-NIL 09/11/2023 0.00     QFT Mitogen-NIL 09/11/2023 >10.00     QFT Final Interpretation 09/11/2023 Negative    Admission on 09/05/2023, Discharged on 09/06/2023   Component Date Value    POC Glucose 09/05/2023 179 (H)     Ventricular Rate 09/05/2023 78     Atrial Rate 09/05/2023 78     NH Interval 09/05/2023 132     QRSD Interval 09/05/2023 82     QT Interval 09/05/2023 352     QTC Interval 09/05/2023 401     P Axis 09/05/2023 73     QRS Justice 09/05/2023 54     T Wave Justice 09/05/2023 49     EXT Preg Test, Ur 09/05/2023 Negative     Control 09/05/2023 Valid    Appointment on 09/05/2023   Component Date Value    WBC 09/05/2023 9.11     RBC 09/05/2023 4.61     Hemoglobin 09/05/2023 12.6     Hematocrit 09/05/2023 41.2     MCV 09/05/2023 89     MCH 09/05/2023 27.3     MCHC 09/05/2023 30.6 (L)     RDW 09/05/2023 13.3     MPV 09/05/2023 13.0 (H)     Platelets [birth year]1917 222     nRBC 09/05/2023 0     Neutrophils Relative 09/05/2023 48     Immat GRANS % 09/05/2023 0     Lymphocytes Relative 09/05/2023 45 (H)     Monocytes Relative 09/05/2023 7     Eosinophils Relative 09/05/2023 0     Basophils Relative 09/05/2023 0     Neutrophils Absolute 09/05/2023 4.30     Immature Grans Absolute 09/05/2023 0.02     Lymphocytes Absolute 09/05/2023 4.12     Monocytes Absolute 09/05/2023 0.61     Eosinophils Absolute 09/05/2023 0.03     Basophils Absolute 09/05/2023 0.03     Sodium 09/05/2023 140     Potassium 09/05/2023 3.6     Chloride 09/05/2023 103     CO2 09/05/2023 26     ANION GAP 09/05/2023 11     BUN 09/05/2023 12     Creatinine 09/05/2023 0.81     Glucose 09/05/2023 120     Calcium 09/05/2023 9.6     AST 09/05/2023 14     ALT 09/05/2023 11     Alkaline Phosphatase 09/05/2023 53     Total Protein 09/05/2023 6.9     Albumin 09/05/2023 4.2     Total Bilirubin 09/05/2023 1.12 (H)     eGFR 09/05/2023 100     CRP 09/05/2023 <1.0     Sed Rate 09/05/2023 6     C4, COMPLEMENT 09/05/2023 16 (L)     C3 Complement 09/05/2023 124     ds DNA Ab 09/05/2023 <1     STREP PNEUMO TYPE1 09/05/2023 3.1     Pneumococcal antibody Ty* 09/05/2023 1.1 (L)     Strep pneumo Type 4 09/05/2023 0.1 (L)     Pneumococcal antibody Ty* 09/05/2023 21.5     Strep pneumo Type 9 09/05/2023 20.4     Strep pneumo Type 12 09/05/2023 1.4     Strep pneumo Type 14 09/05/2023 1.1 (L)     Pneumo Ab Type 17 (17F) 09/05/2023 1.3 (L)     Strep pneumo Type 19 09/05/2023 1.7     Pneumo Ab Type 2 09/05/2023 7.0     Pneumo Ab Type 20 09/05/2023 3.7     Pneumo Ab Type 22 (22F) 09/05/2023 1.8     Pneumococcal antibody Ty* 09/05/2023 3.6     Strep pneumo Type 26 09/05/2023 0.1 (L)     Pneumo Ab Type 43 (11A) 09/05/2023 0.3 (L)     Pneumo Ab Type 5 09/05/2023 0.4 (L)     Strep pneumo Type 51 09/05/2023 0.1 (L)     Pneumo Ab Type 54 (15B) 09/05/2023 1.4     Strep pneumo Type 56 09/05/2023 0.1 (L)     Strep pneumo Type 57 09/05/2023 1.3 (L)     Strep pneumo Type 68 09/05/2023 2.4 Pneumo Ab Type 70 (33F) 09/05/2023 0.7 (L)     Pneumo Ab Type 34 (10A) 09/05/2023 0.3 (L)    Telephone on 08/11/2023   Component Date Value    Right Eye Diabetic Retin* 07/25/2023 None     Left Eye Diabetic Retino* 07/25/2023 None    Lab on 07/20/2023   Component Date Value    WBC 07/20/2023 5.78     RBC 07/20/2023 3.88     Hemoglobin 07/20/2023 11.5     Hematocrit 07/20/2023 35.7     MCV 07/20/2023 92     MCH 07/20/2023 29.6     MCHC 07/20/2023 32.2     RDW 07/20/2023 13.4     MPV 07/20/2023 12.4     Platelets 32/78/4841 220     nRBC 07/20/2023 0     Neutrophils Relative 07/20/2023 35 (L)     Immat GRANS % 07/20/2023 0     Lymphocytes Relative 07/20/2023 53 (H)     Monocytes Relative 07/20/2023 9     Eosinophils Relative 07/20/2023 2     Basophils Relative 07/20/2023 1     Neutrophils Absolute 07/20/2023 2.03     Immature Grans Absolute 07/20/2023 0.02     Lymphocytes Absolute 07/20/2023 3.06     Monocytes Absolute 07/20/2023 0.52     Eosinophils Absolute 07/20/2023 0.12     Basophils Absolute 07/20/2023 0.03     Sodium 07/20/2023 139     Potassium 07/20/2023 3.6     Chloride 07/20/2023 108     CO2 07/20/2023 22     ANION GAP 07/20/2023 9     BUN 07/20/2023 9     Creatinine 07/20/2023 0.94     Glucose, Fasting 07/20/2023 133 (H)     Calcium 07/20/2023 8.9     AST 07/20/2023 8     ALT 07/20/2023 15     Alkaline Phosphatase 07/20/2023 49     Total Protein 07/20/2023 7.1     Albumin 07/20/2023 3.8     Total Bilirubin 07/20/2023 1.01 (H)     eGFR 07/20/2023 83     IgE 07/20/2023 25.1     IGA 07/20/2023 247.0     IGG 07/20/2023 855.0     IGM 07/20/2023 138.0     Diphtheria Ab 07/20/2023 0.17     Tetanus Antitoxoid IgG Ab 07/20/2023 0.78     STREP PNEUMO TYPE1 07/20/2023 2.8     Pneumococcal antibody Ty* 07/20/2023 0.9 (L)     Strep pneumo Type 4 07/20/2023 <0.1 (L)     Pneumococcal antibody Ty* 07/20/2023 16.1     Strep pneumo Type 9 07/20/2023 12.6     Strep pneumo Type 12 07/20/2023 1.0 (L)     Strep pneumo Type 14 07/20/2023 1.1 (L)     Pneumo Ab Type 17 (17F) 07/20/2023 1.2 (L)     Strep pneumo Type 19 07/20/2023 1.5     Pneumo Ab Type 2 07/20/2023 5.7     Pneumo Ab Type 20 07/20/2023 3.5     Pneumo Ab Type 22 (22F) 07/20/2023 1.4     Pneumococcal antibody Ty* 07/20/2023 3.1     Strep pneumo Type 26 07/20/2023 <0.1 (L)     Pneumo Ab Type 43 (11A) 07/20/2023 0.3 (L)     Pneumo Ab Type 5 07/20/2023 0.3 (L)     Strep pneumo Type 51 07/20/2023 0.1 (L)     Pneumo Ab Type 54 (15B) 07/20/2023 1.2 (L)     Strep pneumo Type 56 07/20/2023 0.1 (L)     Strep pneumo Type 57 07/20/2023 1.1 (L)     Strep pneumo Type 68 07/20/2023 2.1     Pneumo Ab Type 70 (33F) 07/20/2023 0.7 (L)     Pneumo Ab Type 34 (10A) 07/20/2023 0.2 (L)    Appointment on 06/05/2023   Component Date Value    Hemoglobin A1C 06/05/2023 6.4 (H)     EAG 06/05/2023 137     TSH 3RD GENERATON 06/05/2023 1.617    Hospital Outpatient Visit on 03/23/2023   Component Date Value    POC Glucose 03/23/2023 107     EXT Preg Test, Ur 03/23/2023 Negative     Control 03/23/2023 Valid     Case Report 03/23/2023                      Value:Surgical Pathology Report                         Case: [id number]                                   Authorizing Provider:  Joel Edwards MD            Collected:           03/23/2023 1429              Ordering Location:     [address] [place] [address]        Received:            03/23/2023 [address] [place] [address] [facility]                                                    Pathologist:           Michelle Lujan MD                                                          Specimens:   A) - Duodenum, cold bx-duodenum                                                                     B) - Stomach, antrum cold bx                                                                        C) - Stomach, body cold bx                                                                 Final Diagnosis 03/23/2023                      Value: This result contains rich text formatting which cannot be displayed here. Additional Information 03/23/2023                      Value: This result contains rich text formatting which cannot be displayed here. Gross Description 03/23/2023                      Value: This result contains rich text formatting which cannot be displayed here. Clinical Information 03/23/2023                      Value:History of intestinal metaplasia    POC Glucose 03/23/2023 131    There may be more visits with results that are not included.        Substance Abuse History:  Social History     Substance and Sexual Activity   Drug Use Never       Family Psychiatric History:   Family History   Problem Relation Age of Onset    Hypertension Mother     Migraines Mother         Headache    Diabetes type II Mother     Varicose Veins Mother     Hyperlipidemia Mother     Diabetes Mother     Arthritis Mother     Depression Mother     Hearing loss Mother     Anxiety disorder Mother     Eczema Father     Cholelithiasis Father     Hypertension Father     Sarcoidosis Father         Liver    Hyperlipidemia Father     Diabetes Father     Coronary artery disease Father     Nephrolithiasis Father     Cirrhosis Father     Alcohol abuse Father     Thyroid disease Sister     Hashimoto's thyroiditis Sister     Alcohol abuse Brother     Cancer Family     Diabetes Family     Hypertension Family        The following portions of the patient's history were reviewed and updated as appropriate: allergies, current medications, past family history, past medical history, past social history, past surgical history and problem list.    Social History     Socioeconomic History    Marital status: /Civil Union     Spouse name: Not on file    Number of children: 0    Years of education: Not on file    Highest education level: Associate degree: academic program   Occupational History    Occupation: unemployed   Tobacco Use    Smoking status: Never     Passive exposure: Never    Smokeless tobacco: Never    Tobacco comments:     Tobacco smoke exposure (Father smokes cigars)   Vaping Use    Vaping Use: Never used   Substance and Sexual Activity    Alcohol use: Yes     Comment: rarely    Drug use: Never    Sexual activity: Yes     Partners: Male     Birth control/protection: Condom Male, OCP   Other Topics Concern    Not on file   Social History Narrative    Student at [employer] a poor diet; low in vegetables, high in sweets    Dental care, regularly    Lives with parents    Sleeps 8-10 hours a day        Do you have pets? Dog,cat Is pet allowed in bedroom? Yes    Are you a smoker? Never    Does anyone smoke in your home? No       Do you live with smokers? Yes    Travel [place] frequently? No   How many times a year? N/A      Social Determinants of Health     Financial Resource Strain: Medium Risk (4/12/2021)    Overall Financial Resource Strain (CARDIA)     Difficulty of Paying Living Expenses: Somewhat hard   Food Insecurity: Not on file   Transportation Needs: Not on file   Physical Activity: Insufficiently Active (8/7/2020)    Exercise Vital Sign     Days of Exercise per Week: 7 days     Minutes of Exercise per Session: 10 min   Stress: Stress Concern Present (8/7/2020)    [facility]     Feeling of Stress : Rather much   Social Connections: Unknown (8/7/2020)    Social Connection and Isolation Panel [NHANES]     Frequency of Communication with Friends and Family: More than three times a week     Frequency of Social Gatherings with Friends and Family: Not on file     Attends Scientologist Services: Not on file     Active Member of Clubs or Organizations: No     Attends Club or Organization Meetings: Never     Marital Status: Never    Intimate Partner Violence: Not At Risk (8/7/2020)    Humiliation, Afraid, Rape, and Kick questionnaire     Fear of Current or Ex-Partner: No     Emotionally Abused:  No Physically Abused: No     Sexually Abused: No   Housing Stability: Not on file     Social History     Social History Narrative    Student at [employer] a poor diet; low in vegetables, high in sweets    Dental care, regularly    Lives with parents    Sleeps 8-10 hours a day        Do you have pets? Dog,cat Is pet allowed in bedroom? Yes    Are you a smoker? Never    Does anyone smoke in your home? No       Do you live with smokers? Yes    Travel [employer] frequently? No   How many times a year?  N/A        Objective:       Mental status:  Appearance calm and cooperative , adequate hygiene and grooming and good eye contact    Mood dysphoric   Affect affect was constricted   Speech a normal rate and fluent   Thought Processes coherent/organized and normal thought processes   Hallucinations no hallucinations present    Thought Content no delusions   Abnormal Thoughts no suicidal thoughts  and no homicidal thoughts    Orientation  oriented to person and place and time   Remote Memory short term memory intact and long term memory intact   Attention Span concentration intact   Intellect Appears to be of Average Intelligence   Insight Limited insight   Judgement judgment was limited   Muscle Strength Muscle strength and tone were normal and Normal gait    Language no difficulty naming common objects and no difficulty repeating a phrase    Fund of Knowledge displays adequate knowledge of current events, adequate fund of knowledge regarding past history and adequate fund of knowledge regarding vocabulary                Assessment/Plan:       Diagnoses and all orders for this visit:    Bipolar disorder, current episode mixed, moderate ([address])    Mixed obsessional thoughts and acts    Chronic post-traumatic stress disorder (PTSD)    Generalized anxiety disorder with panic attacks              Treatment Recommendations- Risks Benefits      Immediate Medical/Psychiatric/Psychotherapy Treatments and Any Precautions: as stated in HPI    Risks, Benefits And Possible Side Effects Of Medications:  {PSYCH RISK, BENEFITS AND POSSIBLE SIDE EFFECTS (Optional):52315    Controlled Medication Discussion: Discussed with patient Black Box warning on concurrent use of benzodiazepines and opioid medications including sedation, respiratory depression, coma and death. Patient understands the risk of treatment with benzodiazepines in addition to opioids and wants to continue taking those medications. , Discussed with patient the risks of sedation, respiratory depression, impairment of ability to drive and potential for abuse and addiction related to treatment with benzodiazepine medications. The patient understands risk of treatment with benzodiazepine medications, agrees to not drive if feels impaired and agrees to take medications as prescribed. and The patient has been filling controlled prescriptions on time as prescribed to [address] program.      Psychotherapy Provided: Individual psychotherapy provided. Individual psychotherapy provided: Yes  Counseling was provided during the session today for 16 minutes. Medications, treatment progress and treatment plan reviewed with Amanda East. Medication education provided to Amanda East. Goals discussed during in session: improve control of mood stability. Recent stressor including  family problems, family conflict, family issues, school stress, health issues, medical problems, limited support, social difficulties, everyday stressors and ongoing anxiety discussed with Amanda East.    Coping strategies including compliance with medications, contacting a therapist, deep/slow breathing, eliminating avoidance, engaging in previously avoided activities, exercising, getting into a good routine, improving self-esteem, increasing energy, increasing interest in usual activities, increasing motivation, increasing social interaction, keeping busy at home, maintain healthy diet, maintain heathy sleeping hygiene and maintain positive attitude reviewed with Josué Maki. Importance of medication and treatment compliance reviewed with Josué Maki. Educated on importance of medication and treatment compliance. Importance of follow up with family physician for medical issues reviewed with Josué Maki. Supportive therapy provided.                             Visit Time    Visit Start Time: 11:30  Visit Stop Time: 12:00  Total Visit Duration:  30 minutes

## 2023-11-01 NOTE — TELEPHONE ENCOUNTER
Caller: Patient    Doctor: Shena Hardy    Reason for call: Calling to see if disability forms were received. Advised I did not see anything scanned into the chart. Gave pt fax number and she said she will have them fax it again.     Call back#: [phone]

## 2023-11-02 ENCOUNTER — APPOINTMENT (OUTPATIENT)
Dept: LAB | Age: 28
End: 2023-11-02
Payer: COMMERCIAL

## 2023-11-02 ENCOUNTER — SOCIAL WORK (OUTPATIENT)
Dept: BEHAVIORAL/MENTAL HEALTH CLINIC | Facility: CLINIC | Age: 28
End: 2023-11-02
Payer: COMMERCIAL

## 2023-11-02 DIAGNOSIS — E10.69 TYPE I DIABETES MELLITUS WITH HYPEROSMOLAR COMA: ICD-10-CM

## 2023-11-02 DIAGNOSIS — E10.65 TYPE I DIABETES MELLITUS WITH HYPEROSMOLAR COMA: ICD-10-CM

## 2023-11-02 DIAGNOSIS — F41.0 GENERALIZED ANXIETY DISORDER WITH PANIC ATTACKS: ICD-10-CM

## 2023-11-02 DIAGNOSIS — F42.2 MIXED OBSESSIONAL THOUGHTS AND ACTS: ICD-10-CM

## 2023-11-02 DIAGNOSIS — M35.9 UNDIFFERENTIATED CONNECTIVE TISSUE DISEASE (HCC): ICD-10-CM

## 2023-11-02 DIAGNOSIS — F41.1 GENERALIZED ANXIETY DISORDER WITH PANIC ATTACKS: ICD-10-CM

## 2023-11-02 DIAGNOSIS — E87.0 TYPE I DIABETES MELLITUS WITH HYPEROSMOLAR COMA: ICD-10-CM

## 2023-11-02 DIAGNOSIS — K04.7 DENTAL ABSCESS: ICD-10-CM

## 2023-11-02 DIAGNOSIS — F43.12 CHRONIC POST-TRAUMATIC STRESS DISORDER (PTSD): Primary | ICD-10-CM

## 2023-11-02 DIAGNOSIS — Z28.39 PERSONAL HISTORY OF UNDERIMMUNIZATION STATUS: ICD-10-CM

## 2023-11-02 DIAGNOSIS — E10.649 UNCONTROLLED TYPE 1 DIABETES MELLITUS WITH HYPOGLYCEMIA WITHOUT COMA (HCC): ICD-10-CM

## 2023-11-02 DIAGNOSIS — D80.6 IMMUNODEFICIENCY DISORDER DUE TO ANTIBODY DEFICIENCY (HCC): ICD-10-CM

## 2023-11-02 PROCEDURE — 86317 IMMUNOASSAY INFECTIOUS AGENT: CPT

## 2023-11-02 PROCEDURE — 36415 COLL VENOUS BLD VENIPUNCTURE: CPT

## 2023-11-02 PROCEDURE — 90847 FAMILY PSYTX W/PT 50 MIN: CPT | Performed by: COUNSELOR

## 2023-11-02 NOTE — PSYCH
Behavioral Health Psychotherapy Progress Note    Psychotherapy Provided: Family Therapy    1. Chronic post-traumatic stress disorder (PTSD)        2. Generalized anxiety disorder with panic attacks        3. Mixed obsessional thoughts and acts            Goals addressed in session: Goal 1     DATA: The therapist reviews the last session and ask Leonard Chavez and Ari Portillo if they came up with a safe word during arguments. Leonard Chavez reports, "We came up with [place]." The therapist ask Leonard Chavez and Ari Portillo how things are going since the last session. Leonard Chavez reports continues concerns about arguments. Leonard Chavez reports, "I want him to listen to what I'm feeling and I don't know why it escalated." Leonard Chavez provides details of the recent argument in regard to Chris's comments. Leonard Chavez and Ari Portillo continue to discuss their concerns about each other's communication skills and styles. The therapist validates both Leonard Chavez and Ari Portillo. The therapist aids Leonard Chavez and Ari Portillo in what they each need from each other. Ari Portillo reports, "I want a normal life." Ari Lindsey continues to report his concerns in regard to Jenny's illnesses reporting. "Illnesses and problems are an inconvience." The therapist reviews effective communication skills with Leonard Chavez  and Ari Portillo with goal of expressing feelings and needs appropriately. The therapist addressess with Ari Portillo and  Leonard Chavez  emotional dysregulation, possible causes, reaction/response to it and how Leathaene Hamlin  can manage/respond to these emotions in a positive and helpful manner. During this session, this clinician used the following therapeutic modalities: Cognitive Behavioral Therapy, Family Therapy, Solution-Focused Therapy, and Supportive Psychotherapy    Substance Abuse was not addressed during this session. If the client is diagnosed with a co-occurring substance use disorder, please indicate any changes in the frequency or amount of use: N/A.  Stage of change for addressing substance use diagnoses: No substance use/Not applicable    ASSESSMENT:  Lupe Llanos presents with a Euthymic/ normal mood. her affect is Normal range and intensity and Tearful, which is congruent, with her mood and the content of the session. The client has not made progress on their goals. Marifer Taylor and [place] both actively engaged in the session and seemed open about the changes in their relationship throughout the years. Lupe Llanos presents with a none risk of suicide, none risk of self-harm, and none risk of harm to others. For any risk assessment that surpasses a "low" rating, a safety plan must be developed. A safety plan was indicated: no  If yes, describe in detail N/A    PLAN: Between sessions, Lupe Llanos will each individually write down their reasons why they fell in love with each other and why they want to be with each other. At the next session, the therapist will use Client-centered Therapy, Cognitive Behavioral Therapy, Family Therapy, Solution-Focused Therapy, and Supportive Psychotherapy to address communication issues within their marriage. Behavioral Health Treatment Plan and Discharge Planning: Lupe Llanos is aware of and agrees to continue to work on their treatment plan. They have identified and are working toward their discharge goals.  yes    Visit start and stop times:    11/02/23  Start Time: 1000  Stop Time: 1100  Total Visit Time: 60 minutes

## 2023-11-03 ENCOUNTER — SOCIAL WORK (OUTPATIENT)
Dept: BEHAVIORAL/MENTAL HEALTH CLINIC | Facility: CLINIC | Age: 28
End: 2023-11-03
Payer: COMMERCIAL

## 2023-11-03 ENCOUNTER — TELEPHONE (OUTPATIENT)
Dept: FAMILY MEDICINE CLINIC | Facility: CLINIC | Age: 28
End: 2023-11-03

## 2023-11-03 DIAGNOSIS — F41.1 GENERALIZED ANXIETY DISORDER WITH PANIC ATTACKS: ICD-10-CM

## 2023-11-03 DIAGNOSIS — F43.12 CHRONIC POST-TRAUMATIC STRESS DISORDER (PTSD): ICD-10-CM

## 2023-11-03 DIAGNOSIS — F41.0 GENERALIZED ANXIETY DISORDER WITH PANIC ATTACKS: ICD-10-CM

## 2023-11-03 DIAGNOSIS — F42.2 MIXED OBSESSIONAL THOUGHTS AND ACTS: Primary | ICD-10-CM

## 2023-11-03 DIAGNOSIS — F31.62 BIPOLAR DISORDER, CURRENT EPISODE MIXED, MODERATE (HCC): ICD-10-CM

## 2023-11-03 LAB
ALDOST SERPL-MCNC: 1.7 NG/DL (ref 0–30)
ALDOST/RENIN PLAS-RTO: 1.7 {RATIO} (ref 0–30)
RENIN PLAS-CCNC: 0.99 NG/ML/HR (ref 0.17–5.38)

## 2023-11-03 PROCEDURE — 90834 PSYTX W PT 45 MINUTES: CPT | Performed by: SOCIAL WORKER

## 2023-11-03 NOTE — TELEPHONE ENCOUNTER
We received FMLA form for the patient from her place of employment. I called and left a message to call me back as we need to know why this is being filled when it was not discuss with the provider.

## 2023-11-03 NOTE — PSYCH
Behavioral Health Psychotherapy Progress Note    Psychotherapy Provided: Individual Psychotherapy     1. Mixed obsessional thoughts and acts        2. Generalized anxiety disorder with panic attacks        3. Chronic post-traumatic stress disorder (PTSD)        4. Bipolar disorder, current episode mixed, moderate ([address])            Goals addressed in session: Goal 1     DATA:     Met with Stefani individually. "Thing shave been very overwhelming'. Discussed last couples session - very emotional, honest upfront statements by Dede De Santiago - All against Jenny's health, obsessional thoughts and inability to work. 'misses who I was bfore I was sick'. Deep thoughts of whether or not to follow through with marriage. 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 'Everything is on his terms'. Health has 'slightly' improved - more time needed for infusions to work. Denied SI    During this session, this clinician used the following therapeutic modalities: Client-centered Therapy, Cognitive Behavioral Therapy, and Cognitive Processing Therapy    Substance Abuse was not addressed during this session. If the client is diagnosed with a co-occurring substance use disorder, please indicate any changes in the frequency or amount of use: . Stage of change for addressing substance use diagnoses: No substance use/Not applicable    ASSESSMENT:  Neelima Min presents with a Depressed mood. her affect is Flat and Tearful, which is congruent, with her mood and the content of the session. The client has made progress on their goals. Neelima Min presents with a low risk of suicide, low risk of self-harm, and low risk of harm to others. For any risk assessment that surpasses a "low" rating, a safety plan must be developed. A safety plan was indicated: no  If yes, describe in detail     PLAN: Between sessions, Neelima Min will journal emotions .  At the next session, the therapist will use Client-centered Therapy, Cognitive Behavioral Therapy, Cognitive Processing Therapy, and Supportive Psychotherapy to address family community, historic trauma being triggered. Behavioral Health Treatment Plan and Discharge Planning: Yanet Devi is aware of and agrees to continue to work on their treatment plan. They have identified and are working toward their discharge goals.  yes    Visit start and stop times:    11/03/23  Start Time: 1400  Stop Time: 1452  Total Visit Time: 52 minutes

## 2023-11-06 ENCOUNTER — TELEPHONE (OUTPATIENT)
Dept: OTHER | Facility: HOSPITAL | Age: 28
End: 2023-11-06

## 2023-11-06 DIAGNOSIS — I95.9 HYPOTENSION: Primary | ICD-10-CM

## 2023-11-06 NOTE — TELEPHONE ENCOUNTER
Follow-up hypotension. Renin 0.9, aldosterone 1.7. In setting of type 1 diabetes, she can have low renin and low aldosterone. In order to determine adrenal function, will check a.m. cortisol/ACTH/DHEA before jumping onto stim test for further evaluation of cortisol axis. We will discuss with endocrinology once results are available. She has an appointment with endocrinology in February next year.

## 2023-11-07 ENCOUNTER — HOSPITAL ENCOUNTER (OUTPATIENT)
Dept: INFUSION CENTER | Facility: CLINIC | Age: 28
Discharge: HOME | End: 2023-11-07
Payer: COMMERCIAL

## 2023-11-07 VITALS
WEIGHT: 137 LBS | RESPIRATION RATE: 16 BRPM | DIASTOLIC BLOOD PRESSURE: 70 MMHG | BODY MASS INDEX: 25.06 KG/M2 | TEMPERATURE: 97.4 F | OXYGEN SATURATION: 98 % | SYSTOLIC BLOOD PRESSURE: 104 MMHG | HEART RATE: 87 BPM

## 2023-11-07 DIAGNOSIS — M32.8 OTHER FORMS OF SYSTEMIC LUPUS ERYTHEMATOSUS, UNSPECIFIED ORGAN INVOLVEMENT STATUS (HCC): Primary | ICD-10-CM

## 2023-11-07 PROCEDURE — 96413 CHEMO IV INFUSION 1 HR: CPT

## 2023-11-07 RX ORDER — DIPHENHYDRAMINE HCL 25 MG
25 TABLET ORAL ONCE
Status: COMPLETED | OUTPATIENT
Start: 2023-11-07 | End: 2023-11-07

## 2023-11-07 RX ORDER — SODIUM CHLORIDE 9 MG/ML
20 INJECTION, SOLUTION INTRAVENOUS ONCE
Status: COMPLETED | OUTPATIENT
Start: 2023-11-07 | End: 2023-11-07

## 2023-11-07 RX ORDER — ACETAMINOPHEN 325 MG/1
650 TABLET ORAL ONCE
Status: CANCELLED | OUTPATIENT
Start: 2023-12-05

## 2023-11-07 RX ORDER — SODIUM CHLORIDE 9 MG/ML
20 INJECTION, SOLUTION INTRAVENOUS ONCE
Status: CANCELLED | OUTPATIENT
Start: 2023-12-05

## 2023-11-07 RX ORDER — DIPHENHYDRAMINE HCL 25 MG
25 TABLET ORAL ONCE
OUTPATIENT
Start: 2023-12-05

## 2023-11-07 RX ORDER — DIPHENHYDRAMINE HCL 25 MG
25 TABLET ORAL ONCE
Status: CANCELLED | OUTPATIENT
Start: 2023-12-05

## 2023-11-07 RX ORDER — ACETAMINOPHEN 325 MG/1
650 TABLET ORAL ONCE
Status: COMPLETED | OUTPATIENT
Start: 2023-11-07 | End: 2023-11-07

## 2023-11-07 RX ORDER — SODIUM CHLORIDE 9 MG/ML
20 INJECTION, SOLUTION INTRAVENOUS ONCE
OUTPATIENT
Start: 2023-12-05

## 2023-11-07 RX ORDER — ACETAMINOPHEN 325 MG/1
650 TABLET ORAL ONCE
OUTPATIENT
Start: 2023-12-05

## 2023-11-07 RX ADMIN — DIPHENHYDRAMINE HYDROCHLORIDE 25 MG: 25 TABLET ORAL at 09:10

## 2023-11-07 RX ADMIN — SODIUM CHLORIDE 20 ML/HR: 0.9 INJECTION, SOLUTION INTRAVENOUS at 09:10

## 2023-11-07 RX ADMIN — ACETAMINOPHEN 650 MG: 325 TABLET ORAL at 09:10

## 2023-11-07 RX ADMIN — BELIMUMAB 600 MG: 120 INJECTION, POWDER, LYOPHILIZED, FOR SOLUTION INTRAVENOUS at 10:09

## 2023-11-07 NOTE — PROGRESS NOTES
Pt resting with no complaints, vitals stable, no recent illness/anitibiotic use,  call bell within reach. All questions answered and emotional support given. All needs met at this time.

## 2023-11-09 LAB
DEPRECATED S PNEUM14 IGG SER-MCNC: 2.9 UG/ML
DEPRECATED S PNEUM19 IGG SER-MCNC: 5.3 UG/ML
DEPRECATED S PNEUM23 IGG SER-MCNC: 3.4 UG/ML
DEPRECATED S PNEUM3 IGG SER-MCNC: 1.3 UG/ML
DEPRECATED S PNEUM4 IGG SER-MCNC: 0.2 UG/ML
DEPRECATED S PNEUM8 AB SER-MCNC: 12.9 UG/ML
DEPRECATED S PNEUM9 IGG SER-MCNC: 11.2 UG/ML
FLUBV RNA SPEC QL NAA+PROBE: 2.7 UG/ML
S PNEUM DA 18C IGG SER-MCNC: 0.3 UG/ML
S PNEUM DA 19A IGG SER-MCNC: 2.6 UG/ML
S PNEUM DA 6B IGG SER-MCNC: 0.5 UG/ML
SL AMB PNEUMO AB TYPE 17 (17F): 1.9 UG/ML
SL AMB PNEUMO AB TYPE 20: 8.5 UG/ML
SL AMB PNEUMO AB TYPE 22 (22F): 4.1 UG/ML
SL AMB PNEUMO AB TYPE 2: 5.8 UG/ML
SL AMB PNEUMO AB TYPE 34 (10A): 1 UG/ML
SL AMB PNEUMO AB TYPE 43 (11A): 0.8 UG/ML
SL AMB PNEUMO AB TYPE 54 (15B): 2.9 UG/ML
SL AMB PNEUMO AB TYPE 5: 1 UG/ML
SL AMB PNEUMO AB TYPE 70 (33F): 2.2 UG/ML
STREP PNEUMO TYPE 1: 2.4 UG/ML
STREP PNEUMO TYPE 51: 0.4 UG/ML
STREP PNEUMO TYPE 68: 2.4 UG/ML

## 2023-11-10 ENCOUNTER — SOCIAL WORK (OUTPATIENT)
Dept: BEHAVIORAL/MENTAL HEALTH CLINIC | Facility: CLINIC | Age: 28
End: 2023-11-10
Payer: COMMERCIAL

## 2023-11-10 DIAGNOSIS — F42.2 MIXED OBSESSIONAL THOUGHTS AND ACTS: Primary | ICD-10-CM

## 2023-11-10 DIAGNOSIS — F41.1 GENERALIZED ANXIETY DISORDER WITH PANIC ATTACKS: ICD-10-CM

## 2023-11-10 DIAGNOSIS — F41.0 GENERALIZED ANXIETY DISORDER WITH PANIC ATTACKS: ICD-10-CM

## 2023-11-10 PROCEDURE — 90834 PSYTX W PT 45 MINUTES: CPT | Performed by: SOCIAL WORKER

## 2023-11-10 NOTE — PSYCH
Behavioral Health Psychotherapy Progress Note    Psychotherapy Provided: Individual Psychotherapy     1. Mixed obsessional thoughts and acts        2. Generalized anxiety disorder with panic attacks            Goals addressed in session: Goal 1 and Goal 2     DATA: Met with Bath individually. 'I don't feel any different but I know this is my life.'  Discussed recent conversation with Beau Nicholas - he genuinely apologized and acknowledged he likes to 'poke the bear' where Bath becomes overwhelmed and yells - then Beau Nicholas is able to avoid the problem. Stefani feels Beau Nicholas hasn't been given the chance/trust she is working on herself - where she can 'dysregulate' herself and make changes if we talk about what the other is feeling. Ongoing ignoring from Beau Nicholas - able to remind him that this is a trigger and reminded him this is disrespectful - progress. Beau Nicholas said he was sorry though didn't want to elaborate - [place] mentioned she deserved to tell Beau Nicholas why she was upset. Also has noticed when she gets the blanket statement sorry - this is when she obsesses and doesn't let things go. Denied SI    During this session, this clinician used the following therapeutic modalities: Client-centered Therapy, Cognitive Behavioral Therapy, Cognitive Processing Therapy, and Supportive Psychotherapy    Substance Abuse was not addressed during this session. If the client is diagnosed with a co-occurring substance use disorder, please indicate any changes in the frequency or amount of use: . Stage of change for addressing substance use diagnoses: No substance use/Not applicable    ASSESSMENT:  Kathy Good presents with a Euthymic/ normal mood. her affect is Normal range and intensity, which is congruent, with her mood and the content of the session. The client has made progress on their goals. Kathy Good presents with a low risk of suicide, low risk of self-harm, and low risk of harm to others.     For any risk assessment that surpasses a "low" rating, a safety plan must be developed. A safety plan was indicated: no  If yes, describe in detail     PLAN: Between sessions, Pete Orantes will continue couples counseling, practice communication skills with Dena Factor. . At the next session, the therapist will use Client-centered Therapy, Cognitive Behavioral Therapy, Cognitive Processing Therapy, and Supportive Psychotherapy to address marriage, health, mood dysregulation, finding employment. Behavioral Health Treatment Plan and Discharge Planning: Pete Orantes is aware of and agrees to continue to work on their treatment plan. They have identified and are working toward their discharge goals.  yes    Visit start and stop times:    11/10/23  11/10/23  Start Time: 1400  Stop Time: 1450  Total Visit Time: 50 minutes

## 2023-11-17 ENCOUNTER — SOCIAL WORK (OUTPATIENT)
Dept: BEHAVIORAL/MENTAL HEALTH CLINIC | Facility: CLINIC | Age: 28
End: 2023-11-17
Payer: COMMERCIAL

## 2023-11-17 ENCOUNTER — TELEPHONE (OUTPATIENT)
Age: 28
End: 2023-11-17

## 2023-11-17 DIAGNOSIS — F42.2 MIXED OBSESSIONAL THOUGHTS AND ACTS: Primary | ICD-10-CM

## 2023-11-17 DIAGNOSIS — F41.1 GENERALIZED ANXIETY DISORDER WITH PANIC ATTACKS: ICD-10-CM

## 2023-11-17 DIAGNOSIS — F31.62 BIPOLAR DISORDER, CURRENT EPISODE MIXED, MODERATE (HCC): ICD-10-CM

## 2023-11-17 DIAGNOSIS — F41.0 GENERALIZED ANXIETY DISORDER WITH PANIC ATTACKS: ICD-10-CM

## 2023-11-17 DIAGNOSIS — F43.12 CHRONIC POST-TRAUMATIC STRESS DISORDER (PTSD): ICD-10-CM

## 2023-11-17 PROCEDURE — 90834 PSYTX W PT 45 MINUTES: CPT | Performed by: SOCIAL WORKER

## 2023-11-17 NOTE — TELEPHONE ENCOUNTER
Caller: Patient    Doctor: Brianna Hewitt    Reason for call:     Patient is calling about the script for methotrexate 50 MG/2ML injections, [facility] is stating the order is written only for two weeks and suppose to last for 17 days. She is calling  The office to see what the problem is, she always got a months supply. She is asking for a call back to discuss the order.     Call back#: [phone]

## 2023-11-17 NOTE — PSYCH
Behavioral Health Psychotherapy Progress Note    Psychotherapy Provided: Individual Psychotherapy     1. Mixed obsessional thoughts and acts        2. Generalized anxiety disorder with panic attacks        3. Chronic post-traumatic stress disorder (PTSD)        4. Bipolar disorder, current episode mixed, moderate ([address])            Goals addressed in session: Goal 1 and Goal 2     DATA: Met with Jenny individually. Discussed anxiety regarding being late for things. Discussed specific situation. Attended hair trial in [place]- 'can't make a decision, to anxiety provoking'. Experienced an anxiety attack while driving in (crying, scratching self and sweating). Utilized skills - sour candy, cold water, fiddled on phone. Issue with mom regarding her dress - didn't like it when it came and blamed Ana Brown and her sister even though is was mom's choice. No issues with sister - Ana Brown feels she is so overwhelmed with her own issues she is in 'la la land'. Denied SI    During this session, this clinician used the following therapeutic modalities: Client-centered Therapy, Cognitive Behavioral Therapy, Cognitive Processing Therapy, and Supportive Psychotherapy    Substance Abuse was not addressed during this session. If the client is diagnosed with a co-occurring substance use disorder, please indicate any changes in the frequency or amount of use: . Stage of change for addressing substance use diagnoses: No substance use/Not applicable    ASSESSMENT:  My Moran presents with a Euthymic/ normal mood. her affect is Normal range and intensity, which is congruent, with her mood and the content of the session. The client has made progress on their goals. My Moran presents with a low risk of suicide, low risk of self-harm, and low risk of harm to others. For any risk assessment that surpasses a "low" rating, a safety plan must be developed.     A safety plan was indicated: no  If yes, describe in detail     PLAN: Between sessions, Timur Briseno will continue boundary setting, remain cognizant of anxiety triggers to discuss further. At the next session, the therapist will use Client-centered Therapy, Cognitive Behavioral Therapy, Cognitive Processing Therapy, and Supportive Psychotherapy to address anxiety, relationships, family, health. Behavioral Health Treatment Plan and Discharge Planning: Timur Briseno is aware of and agrees to continue to work on their treatment plan. They have identified and are working toward their discharge goals.  yes    Visit start and stop times:    11/17/23  Start Time: 1400  Stop Time: 1447  Total Visit Time: 47 minutes

## 2023-11-17 NOTE — TELEPHONE ENCOUNTER
Caller: Patient    Doctor: Ivana Lopez    Reason for call:    Patient is inquiring about the [employer] Disability paperwork, stating the paperwork was faxed to the [employer] office a week ago. She is checking if we received, please call and confirm. .    Call back#: [phone]

## 2023-11-20 ENCOUNTER — TELEPHONE (OUTPATIENT)
Dept: RHEUMATOLOGY | Facility: CLINIC | Age: 28
End: 2023-11-20

## 2023-11-20 NOTE — TELEPHONE ENCOUNTER
Patient was called, and a VM was left. She was told that her prescription had been inputted for a 35 days supply by the pharmacy so her insurance refused to pay for more than 17 days. This nurse had the pharmacy correct the days supply and that she could call and reorder her prescription and receive the full amount. Also that her paperwork is not showing in the system as of yet for her disability, but if she wanted to refax it to the number at [facility] it would be looked for and taken care of it possible.

## 2023-11-22 ENCOUNTER — HOSPITAL ENCOUNTER (OUTPATIENT)
Dept: NON INVASIVE DIAGNOSTICS | Facility: CLINIC | Age: 28
Discharge: HOME | End: 2023-11-22
Payer: COMMERCIAL

## 2023-11-22 ENCOUNTER — TELEPHONE (OUTPATIENT)
Dept: PSYCHIATRY | Facility: CLINIC | Age: 28
End: 2023-11-22

## 2023-11-22 VITALS
BODY MASS INDEX: 25.21 KG/M2 | WEIGHT: 137 LBS | HEIGHT: 62 IN | SYSTOLIC BLOOD PRESSURE: 104 MMHG | HEART RATE: 70 BPM | DIASTOLIC BLOOD PRESSURE: 70 MMHG

## 2023-11-22 DIAGNOSIS — I95.9 HYPOTENSION, UNSPECIFIED HYPOTENSION TYPE: ICD-10-CM

## 2023-11-22 DIAGNOSIS — M35.9 UNDIFFERENTIATED CONNECTIVE TISSUE DISEASE (HCC): ICD-10-CM

## 2023-11-22 LAB
RA PRESSURE ESTIMATED: 3 MMHG
SL CV LV EF: 60

## 2023-11-22 PROCEDURE — 93306 TTE W/DOPPLER COMPLETE: CPT | Performed by: INTERNAL MEDICINE

## 2023-11-22 PROCEDURE — 93306 TTE W/DOPPLER COMPLETE: CPT

## 2023-11-22 NOTE — TELEPHONE ENCOUNTER
Writeyovany RECIO for patient to return call to office to reschedule their 11/30 appointment due to provider being out of office. Transfer call to иринаr.  LUPILLO

## 2023-11-27 ENCOUNTER — TELEPHONE (OUTPATIENT)
Dept: NEPHROLOGY | Facility: CLINIC | Age: 28
End: 2023-11-27

## 2023-11-27 NOTE — TELEPHONE ENCOUNTER
----- Message from Yee Chau MD sent at 11/27/2023  2:28 PM EST -----  Please let patient know that I have reviewed the results of echocardiogram.  Both right and left sides of heart are pumping normally. Let me know if there are any questions. Thank you.

## 2023-11-27 NOTE — TELEPHONE ENCOUNTER
Writer contacted patient and patient stated they did not need to reschedule their cancelled 11/30 appointment.  Next appointment is on 12/14

## 2023-12-01 ENCOUNTER — SOCIAL WORK (OUTPATIENT)
Dept: BEHAVIORAL/MENTAL HEALTH CLINIC | Facility: CLINIC | Age: 28
End: 2023-12-01
Payer: COMMERCIAL

## 2023-12-01 DIAGNOSIS — F42.2 MIXED OBSESSIONAL THOUGHTS AND ACTS: Primary | ICD-10-CM

## 2023-12-01 DIAGNOSIS — F41.0 GENERALIZED ANXIETY DISORDER WITH PANIC ATTACKS: ICD-10-CM

## 2023-12-01 DIAGNOSIS — F31.62 BIPOLAR DISORDER, CURRENT EPISODE MIXED, MODERATE (HCC): ICD-10-CM

## 2023-12-01 DIAGNOSIS — F41.1 GENERALIZED ANXIETY DISORDER WITH PANIC ATTACKS: ICD-10-CM

## 2023-12-01 PROCEDURE — 90837 PSYTX W PT 60 MINUTES: CPT | Performed by: SOCIAL WORKER

## 2023-12-01 NOTE — PSYCH
Behavioral Health Psychotherapy Progress Note    Psychotherapy Provided: Individual Psychotherapy     1. Mixed obsessional thoughts and acts        2. Generalized anxiety disorder with panic attacks        3. Bipolar disorder, current episode mixed, moderate ([address])            Goals addressed in session: Goal 1 and Goal 2     DATA:  Met with Jenny individually. 'Many things going on'. Discussed recent medical changes; insulin pump very uncomfortable and throwing off her sugars for last week. Very fatigued. Working on wedding plans. Experiencing periods of anxiety - denied panic attacks since last session. Chris's parents coming on 12/15 - staying in an Air B&B. Jenny's dad 'made a scene during Thanksgiving by arriving late after spending time at his daughter's home. Denied SI   During this session, this clinician used the following therapeutic modalities: Client-centered Therapy, Cognitive Behavioral Therapy, Cognitive Processing Therapy, and Supportive Psychotherapy    Substance Abuse was not addressed during this session. If the client is diagnosed with a co-occurring substance use disorder, please indicate any changes in the frequency or amount of use: . Stage of change for addressing substance use diagnoses: No substance use/Not applicable    ASSESSMENT:  Nessa Siegel presents with a Euthymic/ normal mood. her affect is Normal range and intensity, which is congruent, with her mood and the content of the session. The client has made progress on their goals. Nessa Siegel presents with a low risk of suicide, low risk of self-harm, and low risk of harm to others. For any risk assessment that surpasses a "low" rating, a safety plan must be developed. A safety plan was indicated: no  If yes, describe in detail     PLAN: Between sessions, Nessa Siegel will monitor sugars, go to ED if in DKA, continue .  At the next session, the therapist will use Client-centered Therapy, Cognitive Behavioral Therapy, Cognitive Processing Therapy, and Supportive Psychotherapy to address above topics. Behavioral Health Treatment Plan and Discharge Planning: Hennaashlee Dov is aware of and agrees to continue to work on their treatment plan. They have identified and are working toward their discharge goals.  yes    Visit start and stop times:    12/01/23  Start Time: 1400  Stop Time: 1455  Total Visit Time: 55 minutes

## 2023-12-05 ENCOUNTER — HOSPITAL ENCOUNTER (OUTPATIENT)
Dept: INFUSION CENTER | Facility: CLINIC | Age: 28
Discharge: HOME | End: 2023-12-05
Payer: COMMERCIAL

## 2023-12-05 VITALS
OXYGEN SATURATION: 99 % | DIASTOLIC BLOOD PRESSURE: 79 MMHG | RESPIRATION RATE: 16 BRPM | BODY MASS INDEX: 25.79 KG/M2 | HEART RATE: 80 BPM | TEMPERATURE: 98 F | WEIGHT: 141 LBS | SYSTOLIC BLOOD PRESSURE: 113 MMHG

## 2023-12-05 DIAGNOSIS — M32.8 OTHER FORMS OF SYSTEMIC LUPUS ERYTHEMATOSUS, UNSPECIFIED ORGAN INVOLVEMENT STATUS (HCC): Primary | ICD-10-CM

## 2023-12-05 PROCEDURE — 96413 CHEMO IV INFUSION 1 HR: CPT

## 2023-12-05 RX ORDER — ACETAMINOPHEN 325 MG/1
650 TABLET ORAL ONCE
OUTPATIENT
Start: 2024-01-02

## 2023-12-05 RX ORDER — SODIUM CHLORIDE 9 MG/ML
20 INJECTION, SOLUTION INTRAVENOUS ONCE
OUTPATIENT
Start: 2024-01-02

## 2023-12-05 RX ORDER — ACETAMINOPHEN 325 MG/1
650 TABLET ORAL ONCE
Status: COMPLETED | OUTPATIENT
Start: 2023-12-05 | End: 2023-12-05

## 2023-12-05 RX ORDER — DIPHENHYDRAMINE HCL 25 MG
25 TABLET ORAL ONCE
Status: COMPLETED | OUTPATIENT
Start: 2023-12-05 | End: 2023-12-05

## 2023-12-05 RX ORDER — DIPHENHYDRAMINE HCL 25 MG
25 TABLET ORAL ONCE
OUTPATIENT
Start: 2024-01-02

## 2023-12-05 RX ORDER — SODIUM CHLORIDE 9 MG/ML
20 INJECTION, SOLUTION INTRAVENOUS ONCE
Status: COMPLETED | OUTPATIENT
Start: 2023-12-05 | End: 2023-12-05

## 2023-12-05 RX ADMIN — BELIMUMAB 640 MG: 400 INJECTION, POWDER, LYOPHILIZED, FOR SOLUTION INTRAVENOUS at 15:02

## 2023-12-05 RX ADMIN — ACETAMINOPHEN 650 MG: 325 TABLET ORAL at 14:18

## 2023-12-05 RX ADMIN — DIPHENHYDRAMINE HYDROCHLORIDE 25 MG: 25 TABLET ORAL at 14:18

## 2023-12-05 RX ADMIN — SODIUM CHLORIDE 20 ML/HR: 0.9 INJECTION, SOLUTION INTRAVENOUS at 14:15

## 2023-12-05 NOTE — PLAN OF CARE
Problem: Knowledge Deficit  Goal: Patient/family/caregiver demonstrates understanding of disease process, treatment plan, medications, and discharge instructions  Description: Complete learning assessment and assess knowledge base.   Interventions:  - Provide teaching at level of understanding  - Provide teaching via preferred learning methods  12/5/2023 1413 by Yoni Caba RN  Outcome: Progressing  12/5/2023 1412 by Yoni Caba RN  Outcome: Progressing

## 2023-12-05 NOTE — PROGRESS NOTES
Patient is here for [facility]. She offers no complaints at this time.  Patient denies any s/s of infection or being on antibiotics at this time

## 2023-12-06 ENCOUNTER — APPOINTMENT (OUTPATIENT)
Dept: LAB | Age: 28
End: 2023-12-06
Payer: COMMERCIAL

## 2023-12-06 DIAGNOSIS — I95.9 HYPOTENSION: ICD-10-CM

## 2023-12-06 LAB
BACTERIA UR QL AUTO: ABNORMAL /HPF
BILIRUB UR QL STRIP: NEGATIVE
CLARITY UR: CLEAR
COLOR UR: ABNORMAL
CORTIS AM PEAK SERPL-MCNC: 12.7 UG/DL (ref 6.7–22.6)
CREAT UR-MCNC: 133.9 MG/DL
GLUCOSE UR STRIP-MCNC: ABNORMAL MG/DL
HGB UR QL STRIP.AUTO: NEGATIVE
KETONES UR STRIP-MCNC: NEGATIVE MG/DL
LEUKOCYTE ESTERASE UR QL STRIP: ABNORMAL
MUCOUS THREADS UR QL AUTO: ABNORMAL
NITRITE UR QL STRIP: NEGATIVE
NON-SQ EPI CELLS URNS QL MICRO: ABNORMAL /HPF
PH UR STRIP.AUTO: 6 [PH]
PROT UR STRIP-MCNC: ABNORMAL MG/DL
PROT UR-MCNC: 7 MG/DL
PROT/CREAT UR: 0.05 MG/G{CREAT} (ref 0–0.1)
RBC #/AREA URNS AUTO: ABNORMAL /HPF
SP GR UR STRIP.AUTO: 1.03 (ref 1–1.03)
UROBILINOGEN UR STRIP-ACNC: <2 MG/DL
WBC #/AREA URNS AUTO: ABNORMAL /HPF

## 2023-12-06 PROCEDURE — 82533 TOTAL CORTISOL: CPT

## 2023-12-06 PROCEDURE — 82024 ASSAY OF ACTH: CPT

## 2023-12-06 PROCEDURE — 82626 DEHYDROEPIANDROSTERONE: CPT

## 2023-12-06 PROCEDURE — 36415 COLL VENOUS BLD VENIPUNCTURE: CPT

## 2023-12-07 ENCOUNTER — TELEPHONE (OUTPATIENT)
Dept: PSYCHIATRY | Facility: CLINIC | Age: 28
End: 2023-12-07

## 2023-12-07 LAB — ACTH PLAS-MCNC: 21 PG/ML (ref 7.2–63.3)

## 2023-12-07 NOTE — TELEPHONE ENCOUNTER
Received a fax from [facility] in regards to the patient needing forms completed  for [employer]. Looked back into the encounters and other papers that were faxed over for dr Max Bass. They were completed but the paper states that it needs to be done again. All paperwork that was faxed to  was scanned into media 12/7/2023 for this encounter. I sent this to  and dr Max Bass.

## 2023-12-08 ENCOUNTER — TELEPHONE (OUTPATIENT)
Dept: NEPHROLOGY | Facility: CLINIC | Age: 28
End: 2023-12-08

## 2023-12-08 ENCOUNTER — DOCUMENTATION (OUTPATIENT)
Dept: NEPHROLOGY | Facility: CLINIC | Age: 28
End: 2023-12-08

## 2023-12-08 ENCOUNTER — SOCIAL WORK (OUTPATIENT)
Dept: BEHAVIORAL/MENTAL HEALTH CLINIC | Facility: CLINIC | Age: 28
End: 2023-12-08
Payer: COMMERCIAL

## 2023-12-08 DIAGNOSIS — F42.2 MIXED OBSESSIONAL THOUGHTS AND ACTS: Primary | ICD-10-CM

## 2023-12-08 DIAGNOSIS — F43.12 CHRONIC POST-TRAUMATIC STRESS DISORDER (PTSD): ICD-10-CM

## 2023-12-08 DIAGNOSIS — I95.0 IDIOPATHIC HYPOTENSION: Primary | ICD-10-CM

## 2023-12-08 DIAGNOSIS — F41.1 GENERALIZED ANXIETY DISORDER WITH PANIC ATTACKS: ICD-10-CM

## 2023-12-08 DIAGNOSIS — F31.62 BIPOLAR DISORDER, CURRENT EPISODE MIXED, MODERATE (HCC): ICD-10-CM

## 2023-12-08 DIAGNOSIS — F41.0 GENERALIZED ANXIETY DISORDER WITH PANIC ATTACKS: ICD-10-CM

## 2023-12-08 PROCEDURE — 90834 PSYTX W PT 45 MINUTES: CPT | Performed by: SOCIAL WORKER

## 2023-12-08 NOTE — PSYCH
Behavioral Health Psychotherapy Progress Note    Psychotherapy Provided: Individual Psychotherapy     1. Mixed obsessional thoughts and acts        2. Generalized anxiety disorder with panic attacks        3. Chronic post-traumatic stress disorder (PTSD)        4. Bipolar disorder, current episode mixed, moderate ([address])            Goals addressed in session: Goal 1 and Goal 2     DATA: Met with Jenny individually. Session focused upon. Repeating circumstances today. Sleep very poor - Ambian ineffective. Currently in a flare up - this is new though. Went to [employer] [facility] - Cortisol was off - has to go for an adrenaline test (?). Discussed in laws coming. Amanda Cruzito worried as she hasn't seen Ashia Prince in 2 yrs - her symptoms have acerbated - their culture doesn't understand a lot of this. Anabel De and Amanda East doing better over past week. Issues with sister remain tense. She is having Leela and Anabel De is refusing to go due to sister's historic comments and behaviors towards him. Jenny's family is giving her a hard time about this but Amanda East will not put him back in that situation. Discussed Dis from work - individual called but unaware of multiple health issues and asked offensive questions - ones that are already in paperwork - assuming Jenny's exhibiting behaviors. Completed marriage planning - felt accomplished. Denied SI    During this session, this clinician used the following therapeutic modalities: Client-centered Therapy, Cognitive Behavioral Therapy, Cognitive Processing Therapy, and Supportive Psychotherapy    Substance Abuse was addressed during this session. If the client is diagnosed with a co-occurring substance use disorder, please indicate any changes in the frequency or amount of use: . Stage of change for addressing substance use diagnoses: No substance use/Not applicable    ASSESSMENT:  Eric Antoine presents with a Anxious and Labile mood.      her affect is Flat, which is congruent, with her mood and the content of the session. The client has made progress on their goals. My Moran presents with a low risk of suicide, low risk of self-harm, and low risk of harm to others. For any risk assessment that surpasses a "low" rating, a safety plan must be developed. A safety plan was indicated: no  If yes, describe in detail     PLAN: Between sessions, My Moran will attend to symptoms, set boundaries with sister, schedule Neph test. At the next session, the therapist will use Client-centered Therapy, Cognitive Behavioral Therapy, Cognitive Processing Therapy, and Supportive Psychotherapy to address Mood fluctuations,  family obstacles, upcoming holiday and family dynamics, relationship    [address] [facility] [address] and Discharge Planning: yM Moran is aware of and agrees to continue to work on their treatment plan. They have identified and are working toward their discharge goals.  yes    Visit start and stop times:    12/08/23  Start Time: 1400  Stop Time: 1450  Total Visit Time: 50 minutes

## 2023-12-08 NOTE — TELEPHONE ENCOUNTER
Saline suppression scheduled for 12/19/23 at 8 AM, [place]. Pt aware.    ----- Message from Suzanna Siegel MD sent at 12/8/2023 10:27 AM EST -----  Regarding: ACTH stimulation test  Can you please help arrange ACTH stimulation test at infusion center. Orders are in. Thank you.

## 2023-12-09 LAB — DHEA SERPL-MCNC: 618 NG/DL (ref 31–701)

## 2023-12-14 ENCOUNTER — SOCIAL WORK (OUTPATIENT)
Dept: BEHAVIORAL/MENTAL HEALTH CLINIC | Facility: CLINIC | Age: 28
End: 2023-12-14
Payer: COMMERCIAL

## 2023-12-14 DIAGNOSIS — F43.12 CHRONIC POST-TRAUMATIC STRESS DISORDER (PTSD): Primary | ICD-10-CM

## 2023-12-14 DIAGNOSIS — F41.1 GENERALIZED ANXIETY DISORDER WITH PANIC ATTACKS: ICD-10-CM

## 2023-12-14 DIAGNOSIS — F31.62 BIPOLAR DISORDER, CURRENT EPISODE MIXED, MODERATE (HCC): ICD-10-CM

## 2023-12-14 DIAGNOSIS — F41.0 GENERALIZED ANXIETY DISORDER WITH PANIC ATTACKS: ICD-10-CM

## 2023-12-14 DIAGNOSIS — F42.2 MIXED OBSESSIONAL THOUGHTS AND ACTS: ICD-10-CM

## 2023-12-14 PROCEDURE — 90847 FAMILY PSYTX W/PT 50 MIN: CPT | Performed by: COUNSELOR

## 2023-12-14 NOTE — TELEPHONE ENCOUNTER
CM outreached to [place] at [phone] for clarification on FMLA request from August. [place] stated that she does not know why we were sent these forms to complete. [place] states that she has been on STD since October and is not out on FMLA. SAMANTHA contacted Aung Bazzi, Integrated  with [employer] absence Management ([phone] ext [phone]). Message was left looking for clarification on why they are requesting paperwork that was already completed in August. Requested a call back.

## 2023-12-14 NOTE — PSYCH
"Behavioral Health Psychotherapy Progress Note    Psychotherapy Provided: Family Therapy    1. Chronic post-traumatic stress disorder (PTSD)        2. Generalized anxiety disorder with panic attacks        3. Mixed obsessional thoughts and acts        4. Bipolar disorder, current episode mixed, moderate (HCC)            Goals addressed in session: Goal 1     DATA: Jenny and Chris meet for their session and Jenny provides details of Chris's reservations in regard to treatment and treatment not working. Jenny reports, \"I took it home with me and I couldn't separate it and he was really unhappy with that.\" The therapist communicates understanding. The therapist, Jenyn and Chris discuss treatment and the therapeutic process. Jenny, Chris and the therapist develop a plan of action in regard to moving forward and ensuring that there is time left in the session to decompress. Jenny reports, \"Arguments have got better, he's not yelling much.\" The therapist aids Jenny in processing her feelings that arguments have decreased and Chris is yelling less. Chris also reports improvement. Chris reports, \"Better than before we don't argue for weeks\" The therapist educates Jenny and Chris on the importance of using effective communication with each other. The therapist educates Jenny and Chris on active listening and the benefits of active listening.   During this session, this clinician used the following therapeutic modalities: Cognitive Behavioral Therapy, Family Therapy, and Supportive Psychotherapy    Substance Abuse was not addressed during this session. If the client is diagnosed with a co-occurring substance use disorder, please indicate any changes in the frequency or amount of use: N/A. Stage of change for addressing substance use diagnoses: No substance use/Not applicable    ASSESSMENT:  Jenny Rodrigues presents with a Euthymic/ normal mood.     her affect is Normal range and intensity, which is " "congruent, with her mood and the content of the session. The client has not made progress on their goals.    Jenny and Chris both actively engaged in treatment and seem to focused on treatment goals.   Jenny Rodrigues presents with a none risk of suicide, none risk of self-harm, and none risk of harm to others.    For any risk assessment that surpasses a \"low\" rating, a safety plan must be developed.    A safety plan was indicated: no  If yes, describe in detail N/A    PLAN: Between sessions, Jenny Rodrigues will review active listening worksheet and make efforts to use active listening skills with each other. At the next session, the therapist will use Cognitive Behavioral Therapy, Solution-Focused Therapy, and Supportive Psychotherapy to address address communication issues within their marriage.    Behavioral Health Treatment Plan and Discharge Planning: Jenny Rodrigues is aware of and agrees to continue to work on their treatment plan. They have identified and are working toward their discharge goals. yes    Visit start and stop times:    12/19/23  Start Time: 1300  Stop Time: 1345  Total Visit Time: 45 minutes  "

## 2023-12-17 NOTE — PROGRESS NOTES
Assessment and Plan:   Ms. Rodrigues is a 28-year-old female with history significant for type 1 insulin-dependent diabetes mellitus diagnosed in 2019 and Hashimoto's thyroiditis diagnosed in 2020 who presents for a follow-up of an undifferentiated connective tissue disorder (diagnosed based on a positive SOHAM 1:80 nuclear, speckled, homogeneous patterns, arthralgias, malar rash/photosensitivity).  She is currently on hydroxychloroquine 200 mg twice daily on the weekdays and once daily on the weekend that was started in July 2021, injectable methotrexate 20 mg once weekly and Benlysta infusions 10 mg/kg every 4 weeks that was added in September 2023.    # Undifferentiated connective tissue disease  - Jenny presents today for a follow-up of an undifferentiated connective tissue disease for which she is currently on hydroxychloroquine 200 mg twice daily on the weekdays and 200 mg once daily on the weekend, subcutaneous methotrexate 20 mg once weekly and Benlysta infusions on a monthly basis that was added after the last visit.  She reports on this regimen she has noticed an improvement in the facial rash but she continues to be symptomatic with fatigue and joint pains/swelling which has overall improved to some degree.  We discussed continuing a few more maintenance infusions with the Benlysta to monitor for an improvement in her symptoms and will reassess at the follow-up visit.  As she would prefer to refrain from Tylenol/NSAIDs and avoid steroids due to the underlying diabetes, we discussed increasing the dose of gabapentin to see if this provides her with benefit.  She will increase the dose to 300 mg twice daily and message me in 2 weeks to let me know if this is helping at which time I will send in a new refill for her.  I also requested she discuss with her psychiatrist if she has trialed duloxetine in the past for the depression which may be an option for management of the joint pains in the future.    - For  now her medication regimen will be continued unchanged.  She will update labs before the follow-up appointment and continue annual follow-up appointments with ophthalmology.  Of note she is aware that methotrexate is teratogenic and I recommend discontinuing this 3 months prior to planning conception.  We also discussed there is insufficient data on the safety of belimumab during pregnancy and around the time of pregnancy planning we will need to have a discussion regarding the risks and benefits.  In the meanwhile she will continue with dual contraception including birth control pills and barrier contraception.        Plan:  Diagnoses and all orders for this visit:    Undifferentiated connective tissue disease (HCC)  -     CBC and differential; Future  -     Comprehensive metabolic panel; Future  -     C-reactive protein; Future  -     Sedimentation rate, automated; Future  -     C4 complement; Future  -     C3 complement; Future  -     Urinalysis with microscopic  -     Protein / creatinine ratio, urine  -     Anti-DNA antibody, double-stranded; Future  -     methotrexate 50 MG/2ML injection; Inject 0.8 mL (20 mg total) under the skin once a week    Methotrexate, long term, current use    Long-term use of Plaquenil    Long-term use of immunosuppressant medication    Hashimoto's disease    Type 1 diabetes mellitus without complication (HCC)      Activities as tolerated.   Exercise: try to maintain a low impact exercise regimen as much as possible.   Continue other medications as prescribed by PCP and other specialists.       RTC in 3 months.        HPI    INITIAL VISIT NOTE (6/2021):  Ms. Rodrigues is a 26-year-old female with history significant for type 1 insulin-dependent diabetes mellitus diagnosed in 2019 and Hashimoto's thyroiditis (elevated thyroid microsomal and thyroglobulin antibodies) diagnosed in 2020, who presents for further evaluation of a positive SOHAM 1:80 nuclear, speckled, homogeneous patterns and  rheumatoid factor of 10, in addition to widespread joint pains.  She is referred by MARGARET Sparks for a rheumatology consult.       Patient reports she started to feel unwell approximately 2 years ago and further evaluation for this led to the diagnosis of type 1 diabetes as well as the Hashimoto's thyroiditis.  She has been managing the diabetes with insulin and states for the hypothyroidism as a result of Hashimoto's thyroiditis she was only started on levothyroxine 25 mcg once daily approximately 1 month ago.  There has been a conflict between the endocrinologists she has seen in regards to starting the levothyroxine, but due to progressive complaints which the patient and her primary care physician felt was related to hypothyroidism she was finally started on thyroid supplementation 1 month ago.  She has not noticed a change in her symptoms so far and has not had a TSH rechecked yet.  The TSH was slightly elevated at 5.24 on 5/1 prior to starting the levothyroxine.  She is also scheduled to see a private endocrinologist for another opinion.     She reports over the past 1 and half years she has also started to experience joint and muscle pain which has been gradually progressive.  She experiences a degree of pain on a daily basis but states that her symptoms can spontaneously flare-up as well as with changes in the weather, especially when it is cold/damp/humid.  She does feel better with the warmer weather and has also started utilizing a therapy pool which does help with her symptoms.  She has noticed joint pains to affect her hands occasionally but prominently in her wrists.  She will notice pain in her shoulders and hips mostly with manipulation and range of motion.  She describes chronic pain that will also affect her knees as well as in her ankles and feet.  At times she will notice a muscle pain throughout her upper and lower extremities as well.  She has noticed muscle cramps to affect her hands  and feet.  No significant joint pains in her elbows or low back.  She has not noticed any obvious swelling of her wrists but feels like they may be swollen as she can gauge this by her bracelet.  She has also noticed swelling to affect her ankles.  She does experience morning stiffness which affects her diffusely and takes about 30-40 minutes to improve.  She tries to avoid Tylenol as this affects her blood glucose monitoring.  She has tried ibuprofen for her symptoms which has not helped significantly.     Other than the body pain she also describes chronic fatigue, unintentional weight gain and hair thinning.  She denies fevers, unintentional weight loss, focal alopecia, dry eyes, dry mouth, inflammatory eye disease, skin rash, psoriasis, photosensitivity, mouth/nose ulcers, swollen glands, pleuritic chest pain, shortness of breath, inflammatory bowel disease, blood clots (reports a history of 1 very early miscarriage), Raynaud's or a family history of autoimmune disease.     Testing done for her symptoms showed the positive SOHAM and borderline positive rheumatoid factor.  A rheumatoid factor was negative in 2019.  An ESR, CRP, anti CCP antibody, CK, Lyme antibody profile, anti neutrophilic cytoplasmic antibodies, Sjogren's antibodies and anti double-stranded DNA antibody were normal.  An MRI of her right knee and ultrasound of her right Achilles tendon in 2018 were normal.     In view of her complaints she was also evaluated by Neurology to rule out multiple sclerosis and currently there is no specific diagnosis from their perspective.  She did have an MRI of her brain done last year which showed a single focus of white matter change which has been stable since 2014.        7/13/2021:  Patient presents for a follow-up of a positive SOHAM.  I reviewed her workup done following the last office visit which showed an unremarkable SOHAM specificity, C3, C4, antiphospholipid antibody testing, urinalysis, urine protein  creatinine ratio, vitamin-D, CK, anti CCP antibody and HLA B27 antigen.  X-rays of her hands and feet were unremarkable.       She reports there has been no change in her symptoms since the last office visit and she continues to describe the chronic fatigue, muscle aches/spasms as well as ongoing joint pains.  She is scheduled for another endocrinology opinion tomorrow to see if any of her symptoms may be related to the underlying hypothyroidism, but this is not felt to be the case so far.      2/16/2022:  Patient presents for follow-up of an undifferentiated connective tissue disease.  She is currently on hydroxychloroquine 200 mg twice daily that was started in July 2021.      She reports in about September she started to notice a significant improvement in her well-being with being on the hydroxychloroquine.  There was an improvement in her fatigue and joint pains.  She had overall been doing well up until January but after she received the COVID-19 booster vaccination noticed a flare-up in her symptoms with more fatigue as well as significant joint pains which mostly affected her hands, wrists, knees and ankles.  She has been noticing intermittent swelling of her fingers as well as ankles/feet.  She has been taking Tylenol alternating with ibuprofen but is unsure if the medications are specifically helping her or if the side effects as a result of the vaccination are gradually improving on their own.  She was also evaluated by Gastroenterology and had an upper endoscopy done which showed chronic gastritis so she was advised to minimize NSAID use.  No recent steroids.    Except for the pain she has also had a few occurrences of redness on her face in a butterfly pattern as well as affecting her forehead.  She has noticed photosensitivity and states while she was at the beach in August she developed a skin rash in sun-exposed areas.  She has been more careful with applying sunscreen as well as covering up in the  sun.  She denies fevers, weight loss, alopecia, mouth/nose ulcers, swollen glands or pleuritic chest pain.      6/22/2022:  Patient presents for follow-up of an undifferentiated connective tissue disease.  She is currently on hydroxychloroquine 200 mg twice daily that was started in July 2021.  I reviewed her blood work from February which showed a normal CBC, CMP, ESR, CRP, C3, C4, double-stranded DNA antibody, urinalysis and urine protein creatinine ratio.    She reports overall since the last office visit she has been fairly stable except for an episode of pericarditis following the COVID-19 booster vaccination in January.  She was seen by Cardiology and prescribed colchicine to take for 3 months.  She reports the symptoms have mostly resolved and only on occasion will she notice some chest pain which is not long-lasting.  She reports with sun exposure she will start to notice more joint pains and fatigue.  For the most part she deals with some degree of joint pain on a daily basis and still notices swelling affecting her hands and ankles.  She will be starting occupational therapy as due to the joint pain in her hands she has started to feel weakness in her hand strength.  No fevers, unintentional weight loss, skin rashes or mouth/nose ulcers.    Although she still endorses symptoms she reports overall she has been feeling much better since starting the hydroxychloroquine.      10/27/2022:  Patient presents for a follow-up of an undifferentiated connective disease.  She is currently on hydroxychloroquine 200 mg twice daily that was started in July 2021 and oral methotrexate 15 mg once weekly that was started in June 2022.  I reviewed her recent labs which showed an unremarkable CBC, CMP, ESR, CRP, C3, C4, double-stranded DNA antibody and urine protein creatinine ratio.      She reports she has been doing well without any joint pains and the methotrexate really seems to have helped her.  With initially starting  it she did notice numbness in her hand and legs for which she was seen in the emergency department but as it was unclear if this was related to the methotrexate I requested she resume it and the symptoms have not been consistent.  No skin rashes or mouth/nose ulcers.    She does report chronic symptoms of abdominal pain, nausea, vomiting and greasy stools for which she has been following with Gastroenterology.  A CT abdomen was unremarkable.  She is scheduled for a HIDA scan.      3/2/2023:  Patient presents for a follow-up of an undifferentiated connective disease.  She is currently on hydroxychloroquine 200 mg twice daily that was started in July 2021 and oral methotrexate 20 mg once weekly that was started in June 2022.  I reviewed her recent labs which showed an unremarkable CBC, CMP, ESR, CRP, C3, C4, double-stranded DNA antibody and urine protein creatinine ratio.      She has been doing well since the last office visit and denies any complaints today.  No symptoms such as true fevers, unintentional weight loss, alopecia, skin rashes, mouth/nose ulcers, swollen glands, pleuritic chest pain or joint pain/swelling/stiffness.    She has been tolerating her medications well and is up-to-date with her annual eye exams but reports since December she has been dealing with recurrent infections.  She took a while to recover from COVID and recently was diagnosed with back to back sinus infections requiring antibiotic use.      9/11/2023:  Patient presents for a follow-up of an undifferentiated connective disease.  She is currently on hydroxychloroquine 200 mg twice daily on the weekdays and once daily on the weekend that was started in July 2021 and oral methotrexate 20 mg once weekly that was started in June 2022.  I reviewed her recent labs which showed an unremarkable CBC, CMP, ESR, CRP, C3, double-stranded DNA antibody and urine protein creatinine ratio.  A C4 complement was slightly decreased at  16.    Unfortunately since the last visit she has been feeling unwell with increased fatigue and widespread body pain that has been flaring up.  This is leading to a significant impact on her mental health.  She reports especially over the past month she has been in a constant flare.  Prior to this she did have an infection for which she was on antibiotics and held the injectable methotrexate for 1 week.  Initially she was trying to manage through the symptoms but did contact me on August 31 at which time I prescribed her prednisone 20 mg once daily for 7 days which did help.  The steroids do raise her blood sugars up to the 400s but she has an insulin pump which will manage the elevated blood sugars.  After completing the steroids she is still not feeling well and is very upset and tearful at today's visit.  Apart from the joint pain she has also been noticing swelling that can primarily affect her hands, wrists, knees and ankles.  The steroids did help with the joint swelling.  She has noticed occurrence of a skin rash on her face as well.    She has been tolerating the hydroxychloroquine and methotrexate well overall but has been dealing with recurrent infections which was present even prior to the initiation of methotrexate.  She is following with immunology to determine if she may have an underlying immunodeficiency.  She is up-to-date with her annual eye exams.      12/18/2023:  Patient presents for a follow-up of an undifferentiated connective disease.  She is currently on hydroxychloroquine 200 mg twice daily on the weekdays and once daily on the weekend that was started in July 2021, injectable methotrexate 20 mg once weekly and Benlysta infusions 10 mg/kg every 4 weeks that was added after the last visit.  I reviewed her recent labs which showed a slightly decreased C4 complement of 13.  A CBC, CMP, ESR, CRP, C3, double-stranded DNA antibody, urine analysis and urine protein creatinine ratio were  unremarkable.    She has completed the Benlysta infusions and received her first maintenance infusion recently.  She notes that it has helped with the facial rash but so far no significant impact on the fatigue and joint pains which she is still symptomatic with.  She avoids Tylenol and NSAIDs as this causes stomach upset.  She is currently on gabapentin 300 mg nightly per neurology for lower extremity neuropathy but states the medication does not help with the neuropathic complaints or with joint pains.    She has been tolerating the hydroxychloroquine, methotrexate and Benlysta well without any concerning side effects.  She is up-to-date with her annual eye exams.    The following portions of the patient's history were reviewed and updated as appropriate: allergies, current medications, past family history, past medical history, past social history, past surgical history and problem list.      Review of Systems  Constitutional: Negative for weight change, fevers, chills, night sweats.  Positive for fatigue.  ENT/Mouth: Negative for hearing changes, ear pain, nasal congestion, sinus pain, hoarseness, sore throat, rhinorrhea, swallowing difficulty.   Eyes: Negative for pain, redness, discharge, vision changes.   Cardiovascular: Negative for chest pain, SOB, palpitations.   Respiratory: Negative for cough, sputum, wheezing, dyspnea.   Gastrointestinal: Negative for nausea, vomiting, diarrhea, pain.  Genitourinary: Negative for dysuria, urinary frequency, hematuria.   Musculoskeletal: As per HPI.  Skin: Negative for skin rash currently, color changes.   Neuro: Negative for weakness, numbness, tingling, loss of consciousness.   Psych: Negative for depression.  Positive for depression.  Heme/Lymph: Negative for easy bruising, bleeding, lymphadenopathy.        Past Medical History:   Diagnosis Date    Abdominal pain     Anaphylaxis     Anxiety     Anxiety and depression     COVID-19 12/2020    Delayed emergence from  anesthesia 03/23/2023    Severe episode of emergence delirium (confused, combative) after MAC anesthetic on 03/2023 for EGD. Received versed 2mg, >50mcg precedex, 5mg IV haldol, and 50mcg fentanyl. Waxing and waning episodes of agitation. Any future anesthetics should NOT be done at [facility].    Delirium, induced by drug     anesthesia    Depression     Diabetes mellitus (HCC)     Disease of thyroid gland     Hashimoto    DKA, type 1 (HCC) 05/11/2021    Eating disorder     history of anorexia/bulemia [id number]    Food intolerance     Fracture of fibula     R Salter I    Gilbert disease     Hashimoto's disease 02/21/2020    Head injury     Headache(784.0)     Nasal congestion     PTSD (post-traumatic stress disorder)     Rectal bleed     Seizures (HCC)     Type 1 diabetes (HCC)        Past Surgical History:   Procedure Laterality Date    COLONOSCOPY      KNEE SURGERY Right 07/06/2020    NASAL SEPTOPLASTY W/ TURBINOPLASTY      SINUS SURGERY      SKIN BIOPSY      TURBINOPLASTY N/A 3/22/2021    Procedure: TURBINOPLASTY;  Surgeon: Jn Hidalgo MD;  Location: [facility];  Service: ENT    UPPER GASTROINTESTINAL ENDOSCOPY      WISDOM TOOTH EXTRACTION      WRIST SURGERY      left; Excision of ganglion       Social History     Socioeconomic History    Marital status: /Civil Union     Spouse name: Not on file    Number of children: 0    Years of education: Not on file    Highest education level: Associate degree: academic program   Occupational History    Occupation: unemployed   Tobacco Use    Smoking status: Never     Passive exposure: Never    Smokeless tobacco: Never    Tobacco comments:     Tobacco smoke exposure (Father smokes cigars)   Vaping Use    Vaping status: Never Used   Substance and Sexual Activity    Alcohol use: Yes     Comment: rarely    Drug use: Never    Sexual activity: Yes     Partners: Male     Birth control/protection: Condom Male, OCP   Other Topics Concern    Not on file   Social History  Narrative    Student at [employer]    Reports a poor diet; low in vegetables, high in sweets    Dental care, regularly    Lives with parents    Sleeps 8-10 hours a day        Do you have pets? Dog,cat Is pet allowed in bedroom?Yes    Are you a smoker? Never    Does anyone smoke in your home? No       Do you live with smokers? Yes    [place] frequently? No   How many times a year? N/A      Social Determinants of Health     Financial Resource Strain: Low Risk  (7/8/2023)    Received from [facility]    Overall Financial Resource Strain (CARDIA)     Difficulty of Paying Living Expenses: Not hard at all   Food Insecurity: No Food Insecurity (7/8/2023)    Received from [facility]    Hunger Vital Sign     Worried About Running Out of Food in the Last Year: Never true     Ran Out of Food in the Last Year: Never true   Transportation Needs: Unknown (7/8/2023)    Received from [facility]    PRAPARE - Transportation     Lack of Transportation (Medical): No     Lack of Transportation (Non-Medical): Not on file   Physical Activity: Insufficiently Active (8/7/2020)    Exercise Vital Sign     Days of Exercise per Week: 7 days     Minutes of Exercise per Session: 10 min   Stress: Stress Concern Present (8/7/2020)    [employer] - Occupational Stress Questionnaire     Feeling of Stress : Rather much   Social Connections: Unknown (8/7/2020)    Social Connection and Isolation Panel [NHANES]     Frequency of Communication with Friends and Family: More than three times a week     Frequency of Social Gatherings with Friends and Family: Not on file     Attends Nondenominational Services: Not on file     Active Member of Clubs or Organizations: No     Attends Club or Organization Meetings: Never     Marital Status: Never    Intimate Partner Violence: Not At Risk (7/8/2023)    Received from [facility]    Humiliation, Afraid, Rape, and Kick  "questionnaire     Fear of Current or Ex-Partner: No     Emotionally Abused: No     Physically Abused: No     Sexually Abused: No   Housing Stability: Unknown (7/8/2023)    Received from [facility]    Housing Stability Vital Sign     Unable to Pay for Housing in the Last Year: No     Number of Places Lived in the Last Year: Not on file     Unstable Housing in the Last Year: No       Family History   Problem Relation Age of Onset    Hypertension Mother     Migraines Mother         Headache    Diabetes type II Mother     Varicose Veins Mother     Hyperlipidemia Mother     Diabetes Mother     Arthritis Mother     Depression Mother     Hearing loss Mother     Anxiety disorder Mother     Eczema Father     Cholelithiasis Father     Hypertension Father     Sarcoidosis Father         Liver    Hyperlipidemia Father     Diabetes Father     Coronary artery disease Father     Nephrolithiasis Father     Cirrhosis Father     Alcohol abuse Father     Thyroid disease Sister     Hashimoto's thyroiditis Sister     Alcohol abuse Brother     Cancer Family     Diabetes Family     Hypertension Family        Allergies   Allergen Reactions    Insulin Glargine Other (See Comments)     LANTUS BRAND -Burning and redness under skin        Current Outpatient Medications:     aspirin (ECOTRIN LOW STRENGTH) 81 mg EC tablet, Take 162 mg by mouth 2 (two) times a day as needed for headaches, Disp: , Rfl:     Azelastine HCl 137 MCG/SPRAY SOLN, 2 SPRAYS INTO EACH NOSTRIL 2 (TWO) TIMES A DAY AS NEEDED FOR RHINITIS USE IN EACH NOSTRIL AS DIRECTED, Disp: 90 mL, Rfl: 0    BD Insulin Syringe U/F 31G X 5/16\" 0.5 ML MISC, Use as directly with weekly methotrexate injection., Disp: 100 each, Rfl: 0    Belimumab (BENLYSTA IV), Inject into a catheter in a vein every 14 (fourteen) days Switching to monthly on 12/5, Disp: , Rfl:     clindamycin (CLEOCIN T) 1 % lotion, Apply 1 application. topically 2 (two) times a day, Disp: , Rfl:     folic acid " (FOLVITE) 1 mg tablet, Take 1 tablet (1 mg total) by mouth daily, Disp: 90 tablet, Rfl: 3    gabapentin (Neurontin) 300 mg capsule, Take 1 capsule (300 mg total) by mouth daily at bedtime PRN, Disp: 90 capsule, Rfl: 3    glucagon 1 MG injection, 1 mg, Disp: , Rfl:     Glucagon HCl (Glucagon Emergency) 1 MG/ML SOLR, INJECT 1 MG AS DIRECTED AS NEEDED (WHEN PASSED OUT FROM LOW BLOOD SUGAR)., Disp: , Rfl:     hydroxychloroquine (PLAQUENIL) 200 mg tablet, Take 1 tablet (200 mg total) by mouth 2 (two) times a day with meals, Disp: 180 tablet, Rfl: 3    Insulin Disposable Pump (Omnipod 5 G6 Pod, Gen 5,) MISC, INJECT 1 EACH UNDER THE SKIN EVERY THIRD DAY. E10.65, Disp: , Rfl:     ketoconazole (NIZORAL) 2 % shampoo, Apply topically once, Disp: , Rfl:     levonorgestrel-ethinyl estradiol (Altavera) 0.15-30 MG-MCG per tablet, Take 1 tablet by mouth daily, Disp: 84 tablet, Rfl: 3    levothyroxine 25 mcg tablet, Take 1 tablet (25 mcg total) by mouth daily, Disp: 60 tablet, Rfl: 0    LORazepam (Ativan) 0.5 mg tablet, Take 1 tablet (0.5 mg total) by mouth 2 (two) times a day as needed for anxiety, Disp: 60 tablet, Rfl: 0    methotrexate 50 MG/2ML injection, Inject 0.8 mL (20 mg total) under the skin once a week, Disp: 8 mL, Rfl: 4    miconazole 2 % cream, Apply 1 Application topically 2 (two) times a day To affected area, Disp: , Rfl:     NovoLOG 100 UNIT/ML injection, INJECT 3 TIMES A DAY WITH MEALS BASED ON ICR 4 AND ISF 30 FOR TDD 90 UNITS DAILY VIA INSULIN PUMP, Disp: , Rfl:     OneTouch Verio test strip, USE 4 TIMES A DAY BEFORE MEALS AND AT BEDTIME, Disp: , Rfl:     pancrelipase, Lip-Prot-Amyl, (CREON) 12,000 units capsule, Take 36,000 units of lipase by mouth 3 (three) times a day with meals Take 3 capsules prior to each meal and 1 prior to snack, Disp: 120 capsule, Rfl: 3    Tresiba FlexTouch 100 units/mL injection pen, INJECT 34 UNITS DAILY IN THE EVENT OF PUMP FAILURE E10.65, Disp: , Rfl:     tretinoin (RETIN-A) 0.025 %  "cream, Apply topically daily at bedtime, Disp: , Rfl:     zolpidem (AMBIEN) 10 mg tablet, Take 1 tablet (10 mg total) by mouth daily at bedtime as needed for sleep, Disp: 30 tablet, Rfl: 0    Insulin Pen Needle (BD PEN NEEDLE NASIM U/F) 32G X 4 MM MISC, by Does not apply route 4 (four) times a day for 180 days, Disp: 400 each, Rfl: 3      Objective:    Vitals:    12/18/23 1140   BP: 108/66   Weight: 61.2 kg (135 lb)   Height: 5' 2\" (1.575 m)         Physical Exam  General: Well appearing, well nourished, in no distress. Oriented x 3, normal mood and affect.  Ambulating without difficulty.  Skin: Good turgor, no rash today, unusual bruising or prominent lesions.  Mild facial acne noted.  Hair: Normal texture and distribution.  HEENT:  Head: Normocephalic, atraumatic.  Eyes: Conjunctiva clear, sclera non-icteric, EOM intact.  Extremities: No amputations or deformities, cyanosis, edema.  Neurologic: Alert and oriented. No focal neurological deficits appreciated.   Psychiatric: Tearful and upset.      Chelle Tanner M.D.  Rheumatology  "

## 2023-12-18 ENCOUNTER — OFFICE VISIT (OUTPATIENT)
Dept: RHEUMATOLOGY | Facility: CLINIC | Age: 28
End: 2023-12-18
Payer: COMMERCIAL

## 2023-12-18 ENCOUNTER — TELEPHONE (OUTPATIENT)
Dept: PSYCHIATRY | Facility: CLINIC | Age: 28
End: 2023-12-18

## 2023-12-18 VITALS
DIASTOLIC BLOOD PRESSURE: 66 MMHG | BODY MASS INDEX: 24.84 KG/M2 | HEIGHT: 62 IN | SYSTOLIC BLOOD PRESSURE: 108 MMHG | WEIGHT: 135 LBS

## 2023-12-18 DIAGNOSIS — E10.9 TYPE 1 DIABETES MELLITUS WITHOUT COMPLICATION (HCC): ICD-10-CM

## 2023-12-18 DIAGNOSIS — Z79.60 LONG-TERM USE OF IMMUNOSUPPRESSANT MEDICATION: ICD-10-CM

## 2023-12-18 DIAGNOSIS — M35.9 UNDIFFERENTIATED CONNECTIVE TISSUE DISEASE (HCC): Primary | ICD-10-CM

## 2023-12-18 DIAGNOSIS — Z79.631 METHOTREXATE, LONG TERM, CURRENT USE: ICD-10-CM

## 2023-12-18 DIAGNOSIS — E06.3 HASHIMOTO'S DISEASE: ICD-10-CM

## 2023-12-18 DIAGNOSIS — Z79.899 LONG-TERM USE OF PLAQUENIL: ICD-10-CM

## 2023-12-18 PROCEDURE — 99214 OFFICE O/P EST MOD 30 MIN: CPT | Performed by: INTERNAL MEDICINE

## 2023-12-18 RX ORDER — METHOTREXATE 25 MG/ML
20 INJECTION, SOLUTION INTRA-ARTERIAL; INTRAMUSCULAR; INTRAVENOUS WEEKLY
Qty: 8 ML | Refills: 4 | Status: SHIPPED | OUTPATIENT
Start: 2023-12-18

## 2023-12-18 NOTE — TELEPHONE ENCOUNTER
Called and left message for patient to inform 12/18 2PM was cancelled due to provider being out of the office. Requested return call to reschedule. Please schedule upon return call. Thank you. Previous appt was virtual via text.

## 2023-12-19 ENCOUNTER — HOSPITAL ENCOUNTER (OUTPATIENT)
Dept: INFUSION CENTER | Facility: CLINIC | Age: 28
Discharge: HOME | End: 2023-12-19
Payer: COMMERCIAL

## 2023-12-19 ENCOUNTER — TELEPHONE (OUTPATIENT)
Dept: OBGYN CLINIC | Facility: HOSPITAL | Age: 28
End: 2023-12-19

## 2023-12-19 VITALS
SYSTOLIC BLOOD PRESSURE: 121 MMHG | DIASTOLIC BLOOD PRESSURE: 82 MMHG | TEMPERATURE: 98.1 F | HEART RATE: 88 BPM | RESPIRATION RATE: 16 BRPM | OXYGEN SATURATION: 99 %

## 2023-12-19 DIAGNOSIS — I95.0 IDIOPATHIC HYPOTENSION: Primary | ICD-10-CM

## 2023-12-19 LAB
CORTIS SERPL-MCNC: 11.2 UG/DL
CORTIS SERPL-MCNC: 24.3 UG/DL
CORTIS SERPL-MCNC: 32.8 UG/DL

## 2023-12-19 PROCEDURE — 82533 TOTAL CORTISOL: CPT | Performed by: INTERNAL MEDICINE

## 2023-12-19 PROCEDURE — 82024 ASSAY OF ACTH: CPT | Performed by: INTERNAL MEDICINE

## 2023-12-19 RX ADMIN — COSYNTROPIN 0.25 MG: 0.25 INJECTION, POWDER, LYOPHILIZED, FOR SOLUTION INTRAVENOUS at 08:25

## 2023-12-19 NOTE — PROGRESS NOTES
Pt here for ACTH stim test and offers no complaints. Baseline labs, med admin, 30 post labs and 60min post labs completed without incident. Confirmed pts next appt for 1/5/24 @ 3pm @ [facility]. Genevieve AMADOR.

## 2023-12-19 NOTE — TELEPHONE ENCOUNTER
Caller: Patient    Doctor: Ciro    Reason for call: Patient is calling stating she sent disability forms from [employer] to be completed and the company did not receive the completed paperwork yet. She stated it was sent in November and is at risk of losing her employment. Please advise    Call back#: [phone]

## 2023-12-20 ENCOUNTER — TELEPHONE (OUTPATIENT)
Dept: NEUROLOGY | Facility: CLINIC | Age: 28
End: 2023-12-20

## 2023-12-20 LAB — ACTH PLAS-MCNC: 14 PG/ML (ref 7.2–63.3)

## 2023-12-20 NOTE — TELEPHONE ENCOUNTER
12/19 at 3:17 pm:    My name is Jenny Rodrigues.  Birth date, February 4th,1995. And I'm calling because I have seen Dr. Espitia in October. Some of the symptoms I was having are still kind of lingering. I wanted to know if maybe I need to make another appointment, or how to go about this. Because I'm having heavy arm symptoms and feeling not well. So if you could call me back at [phone].

## 2023-12-20 NOTE — TELEPHONE ENCOUNTER
Please let her know we need this on an official letterhead and what would she like filled out for estimated return to work date?

## 2023-12-21 ENCOUNTER — HOSPITAL ENCOUNTER (OUTPATIENT)
Dept: ULTRASOUND IMAGING | Facility: HOSPITAL | Age: 28
Discharge: HOME | End: 2023-12-21
Attending: OTOLARYNGOLOGY
Payer: COMMERCIAL

## 2023-12-21 DIAGNOSIS — R59.0 CERVICAL LYMPHADENOPATHY: ICD-10-CM

## 2023-12-21 PROCEDURE — 76536 US EXAM OF HEAD AND NECK: CPT

## 2023-12-22 NOTE — TELEPHONE ENCOUNTER
Caller: Jenny     Doctor: Ciro     Reason for call: Patient is calling following up on her [employer] disability form . Patient has heard back from anyone has of yet and she is in need of having this form filed out as soon as possible . Please advised     Call back#: [phone]

## 2023-12-22 NOTE — TELEPHONE ENCOUNTER
Message left for patient to get official paperwork, and MyChart message sent also sent.  Fax number provided.

## 2023-12-22 NOTE — TELEPHONE ENCOUNTER
Can you please follow up on this message. The form we have received needs to be on an official letterhead from work or disability paperwork. Can you ask her to get it to us in this format and then okay to fill out.    Can also ask her what she would like for return to work date.

## 2023-12-27 NOTE — TELEPHONE ENCOUNTER
Please fill it in as per patient’s request and can stamp my name if allowed, otherwise can ask one of the other providers to sign.

## 2023-12-28 ENCOUNTER — PATIENT MESSAGE (OUTPATIENT)
Dept: NEUROLOGY | Facility: CLINIC | Age: 28
End: 2023-12-28

## 2023-12-29 ENCOUNTER — SOCIAL WORK (OUTPATIENT)
Dept: BEHAVIORAL/MENTAL HEALTH CLINIC | Facility: CLINIC | Age: 28
End: 2023-12-29
Payer: COMMERCIAL

## 2023-12-29 DIAGNOSIS — F41.0 GENERALIZED ANXIETY DISORDER WITH PANIC ATTACKS: ICD-10-CM

## 2023-12-29 DIAGNOSIS — M25.50 ARTHRALGIA, UNSPECIFIED JOINT: ICD-10-CM

## 2023-12-29 DIAGNOSIS — G44.86 CERVICOGENIC HEADACHE: ICD-10-CM

## 2023-12-29 DIAGNOSIS — F41.1 GENERALIZED ANXIETY DISORDER WITH PANIC ATTACKS: ICD-10-CM

## 2023-12-29 DIAGNOSIS — E10.649 UNCONTROLLED TYPE 1 DIABETES MELLITUS WITH HYPOGLYCEMIA WITHOUT COMA (HCC): ICD-10-CM

## 2023-12-29 DIAGNOSIS — F31.62 BIPOLAR DISORDER, CURRENT EPISODE MIXED, MODERATE (HCC): ICD-10-CM

## 2023-12-29 DIAGNOSIS — F42.2 MIXED OBSESSIONAL THOUGHTS AND ACTS: Primary | ICD-10-CM

## 2023-12-29 DIAGNOSIS — R76.8 RHEUMATOID FACTOR POSITIVE: Primary | ICD-10-CM

## 2023-12-29 DIAGNOSIS — F43.12 CHRONIC POST-TRAUMATIC STRESS DISORDER (PTSD): ICD-10-CM

## 2023-12-29 PROCEDURE — 90834 PSYTX W PT 45 MINUTES: CPT | Performed by: SOCIAL WORKER

## 2023-12-29 NOTE — TELEPHONE ENCOUNTER
Patient was in the office and rescheduled appt for med mgmt for 2/28/24 130PM virtual via text. Appt was placed on cancellation list for sooner appt at 12/18 was canceled per provider. 2/28 1PM therapy appt canceled due to conflicting appts.

## 2023-12-29 NOTE — PSYCH
"Behavioral Health Psychotherapy Progress Note    Psychotherapy Provided: Individual Psychotherapy     1. Mixed obsessional thoughts and acts        2. Generalized anxiety disorder with panic attacks        3. Chronic post-traumatic stress disorder (PTSD)        4. Bipolar disorder, current episode mixed, moderate (HCC)            Goals addressed in session: Goal 1 and Goal 2     DATA: Met with Jenny individually. Session focused upon work denying disability - no record requests were sent on letterhead from them - all denied.  Therapist aware where it was coming from. Obtained records herself - sent over.  Set a probable return to work date as  Feels infusions will begin to kick in next month. Chris got SS# and Employment authorization certificate. Chris's parents came - were very nice. Very helpful with Jenny 'they treat me like I'm going to break'. They are currently in [place]. Discussed [place] culture - Jenny is now higher in the family as she will be the mother of their grandchildren. Jenny's family was 'a disaster'. Denied SI  During this session, this clinician used the following therapeutic modalities: Client-centered Therapy, Cognitive Behavioral Therapy, Cognitive Processing Therapy, and Supportive Psychotherapy    Substance Abuse was not addressed during this session. If the client is diagnosed with a co-occurring substance use disorder, please indicate any changes in the frequency or amount of use: . Stage of change for addressing substance use diagnoses: No substance use/Not applicable    ASSESSMENT:  Jenny Rodrigues presents with a Euthymic/ normal mood.     her affect is Normal range and intensity, which is congruent, with her mood and the content of the session. The client has made progress on their goals.     Jenny Rodrigues presents with a low risk of suicide, low risk of self-harm, and low risk of harm to others.    For any risk assessment that surpasses a \"low\" rating, a safety plan " must be developed.    A safety plan was indicated: no  If yes, describe in detail     PLAN: Between sessions, Jenny Rodrigues will Continue infusion therapy. At the next session, the therapist will use Client-centered Therapy, Cognitive Behavioral Therapy, Cognitive Processing Therapy, and Supportive Psychotherapy to address above topics.    Behavioral Health Treatment Plan and Discharge Planning: Jenny Rodrigues is aware of and agrees to continue to work on their treatment plan. They have identified and are working toward their discharge goals. yes    Visit start and stop times:    12/29/23  Start Time: 1400  Stop Time: 1448  Total Visit Time: 48 minutes

## 2024-01-05 ENCOUNTER — SOCIAL WORK (OUTPATIENT)
Dept: BEHAVIORAL/MENTAL HEALTH CLINIC | Facility: CLINIC | Age: 29
End: 2024-01-05
Payer: COMMERCIAL

## 2024-01-05 ENCOUNTER — TELEPHONE (OUTPATIENT)
Dept: PSYCHIATRY | Facility: CLINIC | Age: 29
End: 2024-01-05

## 2024-01-05 ENCOUNTER — HOSPITAL ENCOUNTER (OUTPATIENT)
Dept: INFUSION CENTER | Facility: CLINIC | Age: 29
End: 2024-01-05
Payer: COMMERCIAL

## 2024-01-05 VITALS
SYSTOLIC BLOOD PRESSURE: 106 MMHG | OXYGEN SATURATION: 99 % | HEART RATE: 88 BPM | WEIGHT: 130.6 LBS | TEMPERATURE: 98.5 F | DIASTOLIC BLOOD PRESSURE: 74 MMHG | BODY MASS INDEX: 23.89 KG/M2

## 2024-01-05 DIAGNOSIS — F42.2 MIXED OBSESSIONAL THOUGHTS AND ACTS: Primary | ICD-10-CM

## 2024-01-05 DIAGNOSIS — F43.12 CHRONIC POST-TRAUMATIC STRESS DISORDER (PTSD): ICD-10-CM

## 2024-01-05 DIAGNOSIS — F31.62 BIPOLAR DISORDER, CURRENT EPISODE MIXED, MODERATE (HCC): ICD-10-CM

## 2024-01-05 DIAGNOSIS — F41.0 GENERALIZED ANXIETY DISORDER WITH PANIC ATTACKS: ICD-10-CM

## 2024-01-05 DIAGNOSIS — M32.8 OTHER FORMS OF SYSTEMIC LUPUS ERYTHEMATOSUS, UNSPECIFIED ORGAN INVOLVEMENT STATUS (HCC): Primary | ICD-10-CM

## 2024-01-05 DIAGNOSIS — F41.1 GENERALIZED ANXIETY DISORDER WITH PANIC ATTACKS: ICD-10-CM

## 2024-01-05 PROCEDURE — 96413 CHEMO IV INFUSION 1 HR: CPT

## 2024-01-05 PROCEDURE — 90834 PSYTX W PT 45 MINUTES: CPT | Performed by: SOCIAL WORKER

## 2024-01-05 RX ORDER — SODIUM CHLORIDE 9 MG/ML
20 INJECTION, SOLUTION INTRAVENOUS ONCE
OUTPATIENT
Start: 2024-01-30

## 2024-01-05 RX ORDER — DIPHENHYDRAMINE HCL 25 MG
25 TABLET ORAL ONCE
Status: COMPLETED | OUTPATIENT
Start: 2024-01-05 | End: 2024-01-05

## 2024-01-05 RX ORDER — SODIUM CHLORIDE 9 MG/ML
20 INJECTION, SOLUTION INTRAVENOUS ONCE
Status: COMPLETED | OUTPATIENT
Start: 2024-01-05 | End: 2024-01-05

## 2024-01-05 RX ORDER — ACETAMINOPHEN 325 MG/1
650 TABLET ORAL ONCE
Status: COMPLETED | OUTPATIENT
Start: 2024-01-05 | End: 2024-01-05

## 2024-01-05 RX ORDER — ACETAMINOPHEN 325 MG/1
650 TABLET ORAL ONCE
OUTPATIENT
Start: 2024-01-30

## 2024-01-05 RX ORDER — DIPHENHYDRAMINE HCL 25 MG
25 TABLET ORAL ONCE
OUTPATIENT
Start: 2024-01-30

## 2024-01-05 RX ADMIN — ACETAMINOPHEN 650 MG: 325 TABLET ORAL at 15:19

## 2024-01-05 RX ADMIN — SODIUM CHLORIDE 20 ML/HR: 0.9 INJECTION, SOLUTION INTRAVENOUS at 15:16

## 2024-01-05 RX ADMIN — DIPHENHYDRAMINE HYDROCHLORIDE 25 MG: 25 TABLET ORAL at 15:19

## 2024-01-05 RX ADMIN — BELIMUMAB 600 MG: 120 INJECTION, POWDER, LYOPHILIZED, FOR SOLUTION INTRAVENOUS at 15:45

## 2024-01-05 NOTE — TELEPHONE ENCOUNTER
Received message patient was looking to schedule follow up appt. Scheduled virtual visit 1/8/24 1130AM. Patient confirmed.

## 2024-01-05 NOTE — PSYCH
"Behavioral Health Psychotherapy Progress Note    Psychotherapy Provided: Individual Psychotherapy     1. Mixed obsessional thoughts and acts        2. Generalized anxiety disorder with panic attacks        3. Chronic post-traumatic stress disorder (PTSD)        4. Bipolar disorder, current episode mixed, moderate (HCC)            Goals addressed in session: Goal 1 and Goal 2     DATA: Met with Jenny individually. 'Things are not good'.  Experienced depression triggered by health obstacles. Crying frequently. Not sleeping - Ambian seems to demonstrate a paradoxia affect with Ambian.  Experienced a 'meltdown' with insulin pump due to high pictched sound - anxiety attack - threw out the window in to [place] while [place] with Chris. Attending couples - working on communication. Denied SI  During this session, this clinician used the following therapeutic modalities: Client-centered Therapy, Cognitive Behavioral Therapy, Cognitive Processing Therapy, and Supportive Psychotherapy    Substance Abuse was not addressed during this session. If the client is diagnosed with a co-occurring substance use disorder, please indicate any changes in the frequency or amount of use: . Stage of change for addressing substance use diagnoses: No substance use/Not applicable    ASSESSMENT:  Jenny Rodrigues presents with a Anxious and Depressed mood.     her affect is Flat and Tearful, which is congruent, with her mood and the content of the session. The client has made progress on their goals.     eJnny Rodrigues presents with a low risk of suicide, low risk of self-harm, and low risk of harm to others.    For any risk assessment that surpasses a \"low\" rating, a safety plan must be developed.    A safety plan was indicated: no  If yes, describe in detail     PLAN: Between sessions, Jenny Rodrigues will await decision of car, attend med management on Monday to discuss med options, utilize skills of walking Dion and playing switch. At the " next session, the therapist will use Client-centered Therapy, Cognitive Behavioral Therapy, Cognitive Processing Therapy, Dialectical Behavior Therapy, and Supportive Psychotherapy to address above topics, marriage, depression, health.    Behavioral Health Treatment Plan and Discharge Planning: Jenny Rodrigues is aware of and agrees to continue to work on their treatment plan. They have identified and are working toward their discharge goals. yes    Visit start and stop times:    01/05/24  Start Time: 1347  Stop Time: 1435  Total Visit Time: 48 minutes

## 2024-01-05 NOTE — PROGRESS NOTES
Patient here for Benlysta, offers no complaints, denies recent infection or abx use. PIV placed in 2 attempts. Resting comfortably.

## 2024-01-05 NOTE — PROGRESS NOTES
Patient tolerated Benlysta without issue. PIV removed intact by SHAHRIAR RN, coban wrap in place. Patient confirms next appointment on 2/12 at 1230, declines AVS. Patient ambulatory upon DC without gait disturbance noted.

## 2024-01-08 ENCOUNTER — TELEMEDICINE (OUTPATIENT)
Dept: PSYCHIATRY | Facility: CLINIC | Age: 29
End: 2024-01-08
Payer: COMMERCIAL

## 2024-01-08 DIAGNOSIS — F33.2 SEVERE EPISODE OF RECURRENT MAJOR DEPRESSIVE DISORDER, WITHOUT PSYCHOTIC FEATURES (HCC): Primary | ICD-10-CM

## 2024-01-08 PROCEDURE — 99213 OFFICE O/P EST LOW 20 MIN: CPT | Performed by: PSYCHIATRY & NEUROLOGY

## 2024-01-08 PROCEDURE — 90833 PSYTX W PT W E/M 30 MIN: CPT | Performed by: PSYCHIATRY & NEUROLOGY

## 2024-01-08 RX ORDER — TRETINOIN 0.5 MG/G
CREAM TOPICAL
COMMUNITY
Start: 2023-12-26

## 2024-01-08 RX ORDER — DOXYCYCLINE HYCLATE 100 MG/1
100 CAPSULE ORAL DAILY
COMMUNITY
Start: 2023-11-20 | End: 2024-01-16

## 2024-01-08 RX ORDER — DULOXETIN HYDROCHLORIDE 20 MG/1
CAPSULE, DELAYED RELEASE ORAL
Qty: 49 CAPSULE | Refills: 0 | Status: SHIPPED | OUTPATIENT
Start: 2024-01-08 | End: 2024-01-16

## 2024-01-08 NOTE — PSYCH
Virtual Regular Visit    Verification of patient location:    Patient is located at Home in the following state in which I hold an active license [place]      Assessment/Plan:    Problem List Items Addressed This Visit    None  Visit Diagnoses       Severe episode of recurrent major depressive disorder, without psychotic features (HCC)    -  Primary    Relevant Medications    DULoxetine (CYMBALTA) 20 mg capsule                   Reason for visit is   No chief complaint on file.       Encounter provider Marline Ro MD    Provider located at [address] [place] [address] [phone]      Recent Visits  Date Type Provider Dept   01/08/24 [facility]   01/05/24 [facility]   Showing recent visits within past 7 days and meeting all other requirements  Future Appointments  No visits were found meeting these conditions.  Showing future appointments within next 150 days and meeting all other requirements       The patient was identified by name and date of birth. Jenny Rodrigues was informed that this is a telemedicine visit and that the visit is being conducted throughthe Epic Embedded platform. She agrees to proceed..  My office door was closed. No one else was in the room.  She acknowledged consent and understanding of privacy and security of the video platform. The patient has agreed to participate and understands they can discontinue the visit at any time.    Patient is aware this is a billable service.     Subjective  Jenny Rodrigues is a 28 y.o. female with Bipolar do  .Since last seen Jenny stated she still struggles regulating her emotions and feels anxious often.   She stated she will not start a mood stabilizer because she did not have a good response in the past and she felt she had the same  struggles with her mood even when she was taking medications. She tried Sertraline but she experienced increased mood lability and SI and she stopped the medication after 3 weeks. She feels her mood dysregulation is related to her Systemic Lupus diagnosis and that is certainly possible. I am also considering a personality disorder (cluster B)        She stated that after she started working the amount of stress has caused her physical and emotional health to decline. She applied for STD and it was approved.  She stated the stress has caused her to oversleep and she has problems with attention and concentration and was making multiple mistakes at work.  She is considering either STD to LTD or switching to part time employment but she needs medical benefits due to her multiple health issues.  She continues to meet with her counselor on a regular basis.   She will started infusions once every 2 week for 6 weeks then once a month, and medication is called Benlysta.  She switched Clonazepam to Lorazepam because she needs a medication she can use as needed but that also acts fast when she experiences episodes of panic. She is reporting raising thoughts and insomnia but denies other manic symptoms. She feels Lorazepam works better than Clonazepam and will like to continue current treatment as prescribed.    Since last seen she stated she has been very anxious and she feels she is drowning between depression and anxiety. She also reported difficulties sleeping and having paradoxical insomnia after taking ambien .  Will d/c ambien.  She had discussion with rheumatologist about starting Duloxetine to help mood and pain. We will start low dose and carefully titrate dose monitoring closely for signs of sonia and the need to combine it with a mood stabilizer medication.  Keep scheduled follow up in Feb. HPI     Past Medical History:   Diagnosis Date    Abdominal pain     Anaphylaxis     Anxiety     Anxiety and depression   "   COVID-19 12/2020    Delayed emergence from anesthesia 03/23/2023    Severe episode of emergence delirium (confused, combative) after MAC anesthetic on 03/2023 for EGD. Received versed 2mg, >50mcg precedex, 5mg IV haldol, and 50mcg fentanyl. Waxing and waning episodes of agitation. Any future anesthetics should NOT be done at [facility].    Delirium, induced by drug     anesthesia    Depression     Diabetes mellitus (HCC)     Disease of thyroid gland     Hashimoto    DKA, type 1 (HCC) 05/11/2021    Eating disorder     history of anorexia/bulemia [id number]    Food intolerance     Fracture of fibula     R Salter I    Gilbert disease     Hashimoto's disease 02/21/2020    Head injury     Headache(784.0)     Nasal congestion     PTSD (post-traumatic stress disorder)     Rectal bleed     Seizures (HCC)     Type 1 diabetes (HCC)        Past Surgical History:   Procedure Laterality Date    COLONOSCOPY      KNEE SURGERY Right 07/06/2020    NASAL SEPTOPLASTY W/ TURBINOPLASTY      SINUS SURGERY      SKIN BIOPSY      TURBINOPLASTY N/A 3/22/2021    Procedure: TURBINOPLASTY;  Surgeon: Jn Hidalgo MD;  Location:  MAIN OR;  Service: ENT    UPPER GASTROINTESTINAL ENDOSCOPY      WISDOM TOOTH EXTRACTION      WRIST SURGERY      left; Excision of ganglion       Current Outpatient Medications   Medication Sig Dispense Refill    doxycycline hyclate (VIBRAMYCIN) 100 mg capsule Take 100 mg by mouth daily      DULoxetine (CYMBALTA) 20 mg capsule Take 1 cap po qam for 1 week and then increase to 2 caps po qam. 49 capsule 0    tretinoin (REFISSA) 0.05 % cream APPLY TO AFFECTED AREAS NIGHTLY IF IRRITATION OCCURS THEN APPLY MOISTURIZER FIRST      aspirin (ECOTRIN LOW STRENGTH) 81 mg EC tablet Take 162 mg by mouth 2 (two) times a day as needed for headaches      Azelastine HCl 137 MCG/SPRAY SOLN 2 SPRAYS INTO EACH NOSTRIL 2 (TWO) TIMES A DAY AS NEEDED FOR RHINITIS USE IN EACH NOSTRIL AS DIRECTED 90 mL 0    BD Insulin Syringe U/F 31G X 5/16\" " 0.5 ML MISC Use as directly with weekly methotrexate injection. 100 each 0    Belimumab (BENLYSTA IV) Inject into a catheter in a vein every 14 (fourteen) days Switching to monthly on 12/5      clindamycin (CLEOCIN T) 1 % lotion Apply 1 application. topically 2 (two) times a day      folic acid (FOLVITE) 1 mg tablet Take 1 tablet (1 mg total) by mouth daily 90 tablet 3    gabapentin (Neurontin) 300 mg capsule Take 1 capsule (300 mg total) by mouth daily at bedtime PRN 90 capsule 3    glucagon 1 MG injection 1 mg      Glucagon HCl (Glucagon Emergency) 1 MG/ML SOLR INJECT 1 MG AS DIRECTED AS NEEDED (WHEN PASSED OUT FROM LOW BLOOD SUGAR).      hydroxychloroquine (PLAQUENIL) 200 mg tablet Take 1 tablet (200 mg total) by mouth 2 (two) times a day with meals 180 tablet 3    Insulin Disposable Pump (Omnipod 5 G6 Pod, Gen 5,) MISC INJECT 1 EACH UNDER THE SKIN EVERY THIRD DAY. E10.65      Insulin Pen Needle (BD PEN NEEDLE NASIM U/F) 32G X 4 MM MISC by Does not apply route 4 (four) times a day for 180 days 400 each 3    ketoconazole (NIZORAL) 2 % shampoo Apply topically once      levonorgestrel-ethinyl estradiol (Altavera) 0.15-30 MG-MCG per tablet Take 1 tablet by mouth daily 84 tablet 3    levothyroxine 25 mcg tablet Take 1 tablet (25 mcg total) by mouth daily 60 tablet 0    LORazepam (Ativan) 0.5 mg tablet Take 1 tablet (0.5 mg total) by mouth 2 (two) times a day as needed for anxiety 60 tablet 0    methotrexate 50 MG/2ML injection Inject 0.8 mL (20 mg total) under the skin once a week 8 mL 4    miconazole 2 % cream Apply 1 Application topically 2 (two) times a day To affected area      NovoLOG 100 UNIT/ML injection INJECT 3 TIMES A DAY WITH MEALS BASED ON ICR 4 AND ISF 30 FOR TDD 90 UNITS DAILY VIA INSULIN PUMP      OneTouch Verio test strip USE 4 TIMES A DAY BEFORE MEALS AND AT BEDTIME      pancrelipase, Lip-Prot-Amyl, (CREON) 12,000 units capsule Take 36,000 units of lipase by mouth 3 (three) times a day with meals Take  3 capsules prior to each meal and 1 prior to snack 120 capsule 3    Tresiba FlexTouch 100 units/mL injection pen INJECT 34 UNITS DAILY IN THE EVENT OF PUMP FAILURE E10.65      tretinoin (RETIN-A) 0.025 % cream Apply topically daily at bedtime       No current facility-administered medications for this visit.        Allergies   Allergen Reactions    Insulin Glargine Other (See Comments)     LANTUS BRAND -Burning and redness under skin        Review of Systems      Mood Anxiety, Depression and Emotional Lability   Behavior Impulsive Behavior   Thought Content Disturbing Thoughts, Feelings   General Emotional Problems and Decreased Functioning   Personality Normal   Other Psych Symptoms Normal   Constitutional Negative   ENT Negative   Cardiovascular Negative   Respiratory Negative   Gastrointestinal Negative   Genitourinary Negative   Musculoskeletal Negative   Integumentary Negative   Neurological Negative   Endocrine Normal    Other Symptoms Normal        Laboratory Results:   Recent Labs (last 12 months):   Hospital Outpatient Visit on 12/19/2023   Component Date Value    Cortisol, Random 12/19/2023 11.2     ACTH 12/19/2023 14.0     Cortisol, Random 12/19/2023 24.3     Cortisol, Random 12/19/2023 32.8    Appointment on 12/06/2023   Component Date Value    Cortisol - AM 12/06/2023 12.7     DHEA 12/06/2023 618     ACTH 12/06/2023 21.0    Hospital Outpatient Visit on 11/22/2023   Component Date Value    LV EF 11/22/2023 60     Est. RA pres 11/22/2023 3.0    Lab on 11/02/2023   Component Date Value    STREP PNEUMO TYPE1 11/02/2023 2.4     Pneumococcal antibody Ty* 11/02/2023 1.3 (L)     Strep pneumo Type 4 11/02/2023 0.2 (L)     Pneumococcal antibody Ty* 11/02/2023 12.9     Strep pneumo Type 9 11/02/2023 11.2     Strep pneumo Type 12 11/02/2023 2.7     Strep pneumo Type 14 11/02/2023 2.9     Pneumo Ab Type 17 (17F) 11/02/2023 1.9     Strep pneumo Type 19 11/02/2023 5.3     Pneumo Ab Type 2 11/02/2023 5.8     Pneumo Ab  Type 20 11/02/2023 8.5     Pneumo Ab Type 22 (22F) 11/02/2023 4.1     Pneumococcal antibody Ty* 11/02/2023 3.4     Strep pneumo Type 26 11/02/2023 0.5 (L)     Pneumo Ab Type 43 (11A) 11/02/2023 0.8 (L)     Pneumo Ab Type 5 11/02/2023 1.0 (L)     Strep pneumo Type 51 11/02/2023 0.4 (L)     Pneumo Ab Type 54 (15B) 11/02/2023 2.9     Strep pneumo Type 56 11/02/2023 0.3 (L)     Strep pneumo Type 57 11/02/2023 2.6     Strep pneumo Type 68 11/02/2023 2.4     Pneumo Ab Type 70 (33F) 11/02/2023 2.2     Pneumo Ab Type 34 (10A) 11/02/2023 1.0 (L)    Lab on 10/27/2023   Component Date Value    Renin 10/27/2023 0.988     Aldosterone 10/27/2023 1.7     Aldos/Renin Ratio 10/27/2023 1.7     Albumin 10/27/2023 4.2     Calcium 10/27/2023 9.2     Phosphorus 10/27/2023 4.3     Glucose 10/27/2023 96     BUN 10/27/2023 13     Creatinine 10/27/2023 0.76     Sodium 10/27/2023 140     Potassium 10/27/2023 4.0     Chloride 10/27/2023 103     CO2 10/27/2023 27     ANION GAP 10/27/2023 10     eGFR 10/27/2023 108    Consult on 10/26/2023   Component Date Value    Creatinine, Ur 10/27/2023 42.1     Protein Urine Random 10/27/2023 <4     Prot/Creat Ratio, Ur 10/27/2023      Creatinine, Ur 10/27/2023 42.1     Albumin,U,Random 10/27/2023 <7.0     Albumin Creat Ratio 10/27/2023 <17     Color, UA 10/27/2023 Colorless     Clarity, UA 10/27/2023 Clear     Specific Gravity, UA 10/27/2023 1.011     pH, UA 10/27/2023 6.5     Leukocytes, UA 10/27/2023 Negative     Nitrite, UA 10/27/2023 Negative     Protein, UA 10/27/2023 Negative     Glucose, UA 10/27/2023 Negative     Ketones, UA 10/27/2023 Negative     Urobilinogen, UA 10/27/2023 <2.0     Bilirubin, UA 10/27/2023 Negative     Occult Blood, UA 10/27/2023 Negative     RBC, UA 10/27/2023 None Seen     WBC, UA 10/27/2023 1-2     Epithelial Cells 10/27/2023 Occasional     Bacteria, UA 10/27/2023 None Seen     LEUKOCYTE ESTERASE,UA 10/26/2023 70+     NITRITE,UA 10/26/2023 -     SL AMB POCT UROBILINOGEN  10/26/2023 0.2     POCT URINE PROTEIN 10/26/2023 15      PH,UA 10/26/2023 8.0     BLOOD,UA 10/26/2023 -     SPECIFIC GRAVITY,UA 10/26/2023 1.015     KETONES,UA 10/26/2023 -     BILIRUBIN,UA 10/26/2023 -     GLUCOSE, UA 10/26/2023 -      COLOR,UA 10/26/2023 yellow     CLARITY,UA 10/26/2023 clear    Transcribe Orders on 10/20/2023   Component Date Value    WBC 12/06/2023 8.86     RBC 12/06/2023 4.43     Hemoglobin 12/06/2023 13.0     Hematocrit 12/06/2023 39.3     MCV 12/06/2023 89     MCH 12/06/2023 29.3     MCHC 12/06/2023 33.1     RDW 12/06/2023 13.4     MPV 12/06/2023 12.5     Platelets 12/06/2023 208     nRBC 12/06/2023 0     Neutrophils Relative 12/06/2023 55     Immat GRANS % 12/06/2023 0     Lymphocytes Relative 12/06/2023 38     Monocytes Relative 12/06/2023 6     Eosinophils Relative 12/06/2023 1     Basophils Relative 12/06/2023 0     Neutrophils Absolute 12/06/2023 4.85     Immature Grans Absolute 12/06/2023 0.03     Lymphocytes Absolute 12/06/2023 3.34     Monocytes Absolute 12/06/2023 0.56     Eosinophils Absolute 12/06/2023 0.06     Basophils Absolute 12/06/2023 0.02     Sodium 12/06/2023 137     Potassium 12/06/2023 4.2     Chloride 12/06/2023 103     CO2 12/06/2023 26     ANION GAP 12/06/2023 8     BUN 12/06/2023 16     Creatinine 12/06/2023 0.83     Glucose, Fasting 12/06/2023 189 (H)     Calcium 12/06/2023 9.2     AST 12/06/2023 19     ALT 12/06/2023 13     Alkaline Phosphatase 12/06/2023 59     Total Protein 12/06/2023 6.6     Albumin 12/06/2023 4.4     Total Bilirubin 12/06/2023 0.92     eGFR 12/06/2023 97     CRP 12/06/2023 <1.0     Sed Rate 12/06/2023 9     C4, COMPLEMENT 12/06/2023 13 (L)     C3 Complement 12/06/2023 117     ds DNA Ab 12/06/2023 <1    Lab on 10/20/2023   Component Date Value    Hemoglobin A1C 10/20/2023 8.7 (H)     EAG 10/20/2023 203     TSH 3RD GENERATON 10/20/2023 0.926     STREP PNEUMO TYPE1 10/20/2023 1.1 (L)     Pneumococcal antibody Ty* 10/20/2023 0.4 (L)     Strep pneumo  Type 4 10/20/2023 <0.1 (L)     Pneumococcal antibody Ty* 10/20/2023 9.4     Strep pneumo Type 9 10/20/2023 12.6     Strep pneumo Type 12 10/20/2023 3.1     Strep pneumo Type 14 10/20/2023 2.7     Strep pneumo Type 19 10/20/2023 2.6     Pneumococcal antibody Ty* 10/20/2023 3.4     Strep pneumo Type 26 10/20/2023 0.5 (L)     Strep pneumo Type 51 10/20/2023 0.4 (L)     Strep pneumo Type 56 10/20/2023 0.3 (L)     Strep pneumo Type 57 10/20/2023 1.4     Strep pneumo Type 68 10/20/2023 3.1    Appointment on 09/11/2023   Component Date Value    QFT Nil 09/11/2023 0.04     QFT TB1-NIL 09/11/2023 0.00     QFT TB2-NIL 09/11/2023 0.00     QFT Mitogen-NIL 09/11/2023 >10.00     QFT Final Interpretation 09/11/2023 Negative    Office Visit on 09/11/2023   Component Date Value    Color, UA 12/06/2023 Light Yellow     Clarity, UA 12/06/2023 Clear     Specific Gravity, UA 12/06/2023 1.027     pH, UA 12/06/2023 6.0     Leukocytes, UA 12/06/2023 Elevated glucose may cause decreased leukocyte values. See urine microscopic for UWBC result (A)     Nitrite, UA 12/06/2023 Negative     Protein, UA 12/06/2023 Trace (A)     Glucose, UA 12/06/2023 >=1000 (1%) (A)     Ketones, UA 12/06/2023 Negative     Urobilinogen, UA 12/06/2023 <2.0     Bilirubin, UA 12/06/2023 Negative     Occult Blood, UA 12/06/2023 Negative     RBC, UA 12/06/2023 None Seen     WBC, UA 12/06/2023 1-2     Epithelial Cells 12/06/2023 Occasional     Bacteria, UA 12/06/2023 Occasional     MUCUS THREADS 12/06/2023 Occasional (A)     Creatinine, Ur 12/06/2023 133.9     Protein Urine Random 12/06/2023 7     Prot/Creat Ratio, Ur 12/06/2023 0.05    There may be more visits with results that are not included.       Substance Abuse History:  Social History     Substance and Sexual Activity   Drug Use Never       Family Psychiatric History:   Family History   Problem Relation Age of Onset    Hypertension Mother     Migraines Mother         Headache    Diabetes type II Mother      Varicose Veins Mother     Hyperlipidemia Mother     Diabetes Mother     Arthritis Mother     Depression Mother     Hearing loss Mother     Anxiety disorder Mother     Eczema Father     Cholelithiasis Father     Hypertension Father     Sarcoidosis Father         Liver    Hyperlipidemia Father     Diabetes Father     Coronary artery disease Father     Nephrolithiasis Father     Cirrhosis Father     Alcohol abuse Father     Thyroid disease Sister     Hashimoto's thyroiditis Sister     Alcohol abuse Brother     Cancer Family     Diabetes Family     Hypertension Family        The following portions of the patient's history were reviewed and updated as appropriate: allergies, current medications, past family history, past medical history, past social history, past surgical history and problem list.    Social History     Socioeconomic History    Marital status: /Civil Union     Spouse name: Not on file    Number of children: 0    Years of education: Not on file    Highest education level: Associate degree: academic program   Occupational History    Occupation: unemployed   Tobacco Use    Smoking status: Never     Passive exposure: Never    Smokeless tobacco: Never    Tobacco comments:     Tobacco smoke exposure (Father smokes cigars)   Vaping Use    Vaping status: Never Used   Substance and Sexual Activity    Alcohol use: Yes     Comment: rarely    Drug use: Never    Sexual activity: Yes     Partners: Male     Birth control/protection: Condom Male, OCP   Other Topics Concern    Not on file   Social History Narrative    Student at [employer]    Reports a poor diet; low in vegetables, high in sweets    Dental care, regularly    Lives with parents    Sleeps 8-10 hours a day        Do you have pets? Dog,cat Is pet allowed in bedroom?Yes    Are you a smoker? Never    Does anyone smoke in your home? No       Do you live with smokers? Yes    Travel [place] frequently? No   How many times a year? N/A      Social Determinants of  Health     Financial Resource Strain: Low Risk  (7/8/2023)    Received from [facility]    Overall Financial Resource Strain (CARDIA)     Difficulty of Paying Living Expenses: Not hard at all   Food Insecurity: No Food Insecurity (7/8/2023)    Received from [facility]    Hunger Vital Sign     Worried About Running Out of Food in the Last Year: Never true     Ran Out of Food in the Last Year: Never true   Transportation Needs: Unknown (7/8/2023)    Received from [facility]    PRAPARE - Transportation     Lack of Transportation (Medical): No     Lack of Transportation (Non-Medical): Not on file   Physical Activity: Insufficiently Active (8/7/2020)    Exercise Vital Sign     Days of Exercise per Week: 7 days     Minutes of Exercise per Session: 10 min   Stress: Stress Concern Present (8/7/2020)    [employer] [place] [employer] - Occupational Stress Questionnaire     Feeling of Stress : Rather much   Social Connections: Unknown (8/7/2020)    Social Connection and Isolation Panel [NHANES]     Frequency of Communication with Friends and Family: More than three times a week     Frequency of Social Gatherings with Friends and Family: Not on file     Attends Worship Services: Not on file     Active Member of Clubs or Organizations: No     Attends Club or Organization Meetings: Never     Marital Status: Never    Intimate Partner Violence: Not At Risk (7/8/2023)    Received from [facility]    Humiliation, Afraid, Rape, and Kick questionnaire     Fear of Current or Ex-Partner: No     Emotionally Abused: No     Physically Abused: No     Sexually Abused: No   Housing Stability: Unknown (7/8/2023)    Received from [facility]    Housing Stability Vital Sign     Unable to Pay for Housing in the Last Year: No     Number of Places Lived in the Last Year: Not on file     Unstable Housing in the Last Year: No     Social History      Social History Narrative    Student at [employer]    Reports a poor diet; low in vegetables, high in sweets    Dental care, regularly    Lives with parents    Sleeps 8-10 hours a day        Do you have pets? Dog,cat Is pet allowed in bedroom?Yes    Are you a smoker? Never    Does anyone smoke in your home? No       Do you live with smokers? Yes    [place] frequently? No   How many times a year? N/A        Objective:       Mental status:  Appearance calm and cooperative , adequate hygiene and grooming and good eye contact    Mood dysphoric   Affect affect was constricted   Speech a normal rate and fluent   Thought Processes coherent/organized and normal thought processes   Hallucinations no hallucinations present    Thought Content no delusions   Abnormal Thoughts no suicidal thoughts  and no homicidal thoughts    Orientation  oriented to person and place and time   Remote Memory short term memory intact and long term memory intact   Attention Span concentration intact   Intellect Appears to be of Average Intelligence   Insight Limited insight   Judgement judgment was limited   Muscle Strength Muscle strength and tone were normal and Normal gait    Language no difficulty naming common objects and no difficulty repeating a phrase    Fund of Knowledge displays adequate knowledge of current events, adequate fund of knowledge regarding past history and adequate fund of knowledge regarding vocabulary                Assessment/Plan:       Diagnoses and all orders for this visit:    Severe episode of recurrent major depressive disorder, without psychotic features (HCC)  -     DULoxetine (CYMBALTA) 20 mg capsule; Take 1 cap po qam for 1 week and then increase to 2 caps po qam.    Other orders  -     doxycycline hyclate (VIBRAMYCIN) 100 mg capsule; Take 100 mg by mouth daily  -     tretinoin (REFISSA) 0.05 % cream; APPLY TO AFFECTED AREAS NIGHTLY IF IRRITATION OCCURS THEN APPLY MOISTURIZER FIRST                Treatment  Recommendations- Risks Benefits      Immediate Medical/Psychiatric/Psychotherapy Treatments and Any Precautions: as stated in HPI    Risks, Benefits And Possible Side Effects Of Medications:  {PSYCH RISK, BENEFITS AND POSSIBLE SIDE EFFECTS (Optional):58422    Controlled Medication Discussion: Discussed with patient Black Box warning on concurrent use of benzodiazepines and opioid medications including sedation, respiratory depression, coma and death. Patient understands the risk of treatment with benzodiazepines in addition to opioids and wants to continue taking those medications. , Discussed with patient the risks of sedation, respiratory depression, impairment of ability to drive and potential for abuse and addiction related to treatment with benzodiazepine medications. The patient understands risk of treatment with benzodiazepine medications, agrees to not drive if feels impaired and agrees to take medications as prescribed. and The patient has been filling controlled prescriptions on time as prescribed to [place] Prescription Drug Monitoring program.      Psychotherapy Provided: Individual psychotherapy provided.     Individual psychotherapy provided: Yes  Counseling was provided during the session today for 16 minutes.  Medications, treatment progress and treatment plan reviewed with Jenny.  Medication education provided to Jenny.  Goals discussed during in session: improve control of mood stability.   Recent stressor including  family problems, family conflict, family issues, school stress, health issues, medical problems, limited support, social difficulties, everyday stressors and ongoing anxiety discussed with Jenny.   Coping strategies including compliance with medications, contacting a therapist, deep/slow breathing, eliminating avoidance, engaging in previously avoided activities, exercising, getting into a good routine, improving self-esteem, increasing energy, increasing interest in  usual activities, increasing motivation, increasing social interaction, keeping busy at home, maintain healthy diet, maintain heathy sleeping hygiene and maintain positive attitude reviewed with Jenny.   Importance of medication and treatment compliance reviewed with Jenny.  Educated on importance of medication and treatment compliance.  Importance of follow up with family physician for medical issues reviewed with Jenny.  Supportive therapy provided.                            Visit Time    Visit Start Time: 11:30  Visit Stop Time: 12:00  Total Visit Duration:  30 minutes

## 2024-01-11 ENCOUNTER — SOCIAL WORK (OUTPATIENT)
Dept: BEHAVIORAL/MENTAL HEALTH CLINIC | Facility: CLINIC | Age: 29
End: 2024-01-11
Payer: COMMERCIAL

## 2024-01-11 DIAGNOSIS — F41.1 GENERALIZED ANXIETY DISORDER WITH PANIC ATTACKS: ICD-10-CM

## 2024-01-11 DIAGNOSIS — F41.0 GENERALIZED ANXIETY DISORDER WITH PANIC ATTACKS: ICD-10-CM

## 2024-01-11 DIAGNOSIS — F42.2 MIXED OBSESSIONAL THOUGHTS AND ACTS: ICD-10-CM

## 2024-01-11 DIAGNOSIS — F43.12 CHRONIC POST-TRAUMATIC STRESS DISORDER (PTSD): Primary | ICD-10-CM

## 2024-01-11 PROCEDURE — 90834 PSYTX W PT 45 MINUTES: CPT | Performed by: COUNSELOR

## 2024-01-11 NOTE — PSYCH
"Behavioral Health Psychotherapy Progress Note    Psychotherapy Provided: Family Therapy    1. Chronic post-traumatic stress disorder (PTSD)        2. Generalized anxiety disorder with panic attacks        3. Mixed obsessional thoughts and acts            Goals addressed in session: Goal 1     DATA: Mary Kate meet for their session. Mary Kate provides an update of Chris's parents visiting, both confirming that the trip has been exhausting due to having to entertain. The therapist ask Jenny and Chris how things have been since the last session. Jenny reports, \"We're doing better communicating more and a little more cordial.\" Mary Kate provides details of the last argument/resolution. The therapist praises Jenny and Chris on both their efforts to argue fair and realize each other's needs. Jenny and Chris report their concerns about their love languages and needs physically. The therapist encourages Jenny to discuss her concerns with her individual therapist. The therapist educates Jenny and Chris on relationship conflict resolution and forgiveness and encourage them to read the literature provided together.    During this session, this clinician used the following therapeutic modalities: Family Therapy and Supportive Psychotherapy    Substance Abuse was not addressed during this session. If the client is diagnosed with a co-occurring substance use disorder, please indicate any changes in the frequency or amount of use: N/A. Stage of change for addressing substance use diagnoses: No substance use/Not applicable    ASSESSMENT:  Jenny Rodrigues presents with a Euthymic/ normal mood.     her affect is Normal range and intensity, which is congruent, with her mood and the content of the session. The client has not made progress on their goals.    Jenny and Chris were engaged throughout the session and seems to be making efforts to improve their communication with each other.  " "Jenny Rodrigues presents with a none risk of suicide, none risk of self-harm, and none risk of harm to others.    For any risk assessment that surpasses a \"low\" rating, a safety plan must be developed.    A safety plan was indicated: no  If yes, describe in detail N/A    PLAN: Between sessions, Jenny Rodrigues will continue to work on treatment goal. At the next session, the therapist will use Cognitive Behavioral Therapy, Family Therapy, and Supportive Psychotherapy to address address communication issues within their marriage.       Behavioral Health Treatment Plan and Discharge Planning: Jenny Rodrigues is aware of and agrees to continue to work on their treatment plan. They have identified and are working toward their discharge goals. yes    Visit start and stop times:    01/12/24  Start Time: 1400  Stop Time: 1447  Total Visit Time: 47 minutes  "

## 2024-01-12 ENCOUNTER — SOCIAL WORK (OUTPATIENT)
Dept: BEHAVIORAL/MENTAL HEALTH CLINIC | Facility: CLINIC | Age: 29
End: 2024-01-12
Payer: COMMERCIAL

## 2024-01-12 DIAGNOSIS — F41.0 GENERALIZED ANXIETY DISORDER WITH PANIC ATTACKS: ICD-10-CM

## 2024-01-12 DIAGNOSIS — F42.2 MIXED OBSESSIONAL THOUGHTS AND ACTS: ICD-10-CM

## 2024-01-12 DIAGNOSIS — F31.62 BIPOLAR DISORDER, CURRENT EPISODE MIXED, MODERATE (HCC): Primary | ICD-10-CM

## 2024-01-12 DIAGNOSIS — F41.1 GENERALIZED ANXIETY DISORDER WITH PANIC ATTACKS: ICD-10-CM

## 2024-01-12 PROCEDURE — 90837 PSYTX W PT 60 MINUTES: CPT | Performed by: SOCIAL WORKER
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
## 2024-01-14 ENCOUNTER — APPOINTMENT (EMERGENCY)
Dept: CT IMAGING | Facility: HOSPITAL | Age: 29
DRG: 638 | End: 2024-01-14
Payer: COMMERCIAL

## 2024-01-14 ENCOUNTER — APPOINTMENT (EMERGENCY)
Dept: RADIOLOGY | Facility: HOSPITAL | Age: 29
DRG: 638 | End: 2024-01-14
Payer: COMMERCIAL

## 2024-01-14 ENCOUNTER — HOSPITAL ENCOUNTER (INPATIENT)
Facility: HOSPITAL | Age: 29
LOS: 1 days | Discharge: HOME | DRG: 638 | End: 2024-01-16
Attending: EMERGENCY MEDICINE | Admitting: INTERNAL MEDICINE
Payer: COMMERCIAL

## 2024-01-14 DIAGNOSIS — E10.65 UNCONTROLLED TYPE 1 DIABETES MELLITUS WITH HYPERGLYCEMIA (HCC): ICD-10-CM

## 2024-01-14 DIAGNOSIS — E83.42 HYPOMAGNESEMIA: ICD-10-CM

## 2024-01-14 DIAGNOSIS — E10.65 TYPE 1 DIABETES MELLITUS WITH HYPERGLYCEMIA (HCC): ICD-10-CM

## 2024-01-14 DIAGNOSIS — E11.10 DKA (DIABETIC KETOACIDOSIS) (HCC): Primary | ICD-10-CM

## 2024-01-14 LAB
ALBUMIN SERPL BCP-MCNC: 4.7 G/DL (ref 3.5–5)
ALP SERPL-CCNC: 75 U/L (ref 34–104)
ALT SERPL W P-5'-P-CCNC: 18 U/L (ref 7–52)
ANION GAP SERPL CALCULATED.3IONS-SCNC: 25 MMOL/L
APTT PPP: 23 SECONDS (ref 23–37)
AST SERPL W P-5'-P-CCNC: 19 U/L (ref 13–39)
BACTERIA UR QL AUTO: NORMAL /HPF
BASE EX.OXY STD BLDV CALC-SCNC: 78.2 % (ref 60–80)
BASE EXCESS BLDV CALC-SCNC: -11.3 MMOL/L
BASOPHILS # BLD AUTO: 0.04 THOUSANDS/ÂΜL (ref 0–0.1)
BASOPHILS NFR BLD AUTO: 1 % (ref 0–1)
BETA-HYDROXYBUTYRATE: 5.3 MMOL/L
BILIRUB SERPL-MCNC: 1.11 MG/DL (ref 0.2–1)
BILIRUB UR QL STRIP: NEGATIVE
BUN SERPL-MCNC: 15 MG/DL (ref 5–25)
CALCIUM SERPL-MCNC: 9.6 MG/DL (ref 8.4–10.2)
CARDIAC TROPONIN I PNL SERPL HS: 2 NG/L
CHLORIDE SERPL-SCNC: 91 MMOL/L (ref 96–108)
CLARITY UR: CLEAR
CO2 SERPL-SCNC: 13 MMOL/L (ref 21–32)
COLOR UR: COLORLESS
CREAT SERPL-MCNC: 0.96 MG/DL (ref 0.6–1.3)
D DIMER PPP FEU-MCNC: 0.46 UG/ML FEU
EOSINOPHIL # BLD AUTO: 0.07 THOUSAND/ÂΜL (ref 0–0.61)
EOSINOPHIL NFR BLD AUTO: 1 % (ref 0–6)
ERYTHROCYTE [DISTWIDTH] IN BLOOD BY AUTOMATED COUNT: 13.3 % (ref 11.6–15.1)
EXT PREGNANCY TEST URINE: NEGATIVE
EXT. CONTROL: NORMAL
FLUAV RNA RESP QL NAA+PROBE: NEGATIVE
FLUBV RNA RESP QL NAA+PROBE: NEGATIVE
GFR SERPL CREATININE-BSD FRML MDRD: 81 ML/MIN/1.73SQ M
GLUCOSE SERPL-MCNC: 747 MG/DL (ref 65–140)
GLUCOSE SERPL-MCNC: >500 MG/DL (ref 65–140)
GLUCOSE UR STRIP-MCNC: ABNORMAL MG/DL
HCO3 BLDV-SCNC: 13.6 MMOL/L (ref 24–30)
HCT VFR BLD AUTO: 43 % (ref 34.8–46.1)
HGB BLD-MCNC: 14.1 G/DL (ref 11.5–15.4)
HGB UR QL STRIP.AUTO: NEGATIVE
IMM GRANULOCYTES # BLD AUTO: 0.02 THOUSAND/UL (ref 0–0.2)
IMM GRANULOCYTES NFR BLD AUTO: 0 % (ref 0–2)
INR PPP: 0.95 (ref 0.84–1.19)
KETONES UR STRIP-MCNC: ABNORMAL MG/DL
LACTATE SERPL-SCNC: 2.8 MMOL/L (ref 0.5–2)
LEUKOCYTE ESTERASE UR QL STRIP: ABNORMAL
LIPASE SERPL-CCNC: 31 U/L (ref 11–82)
LYMPHOCYTES # BLD AUTO: 2.7 THOUSANDS/ÂΜL (ref 0.6–4.47)
LYMPHOCYTES NFR BLD AUTO: 32 % (ref 14–44)
MAGNESIUM SERPL-MCNC: 1.8 MG/DL (ref 1.9–2.7)
MCH RBC QN AUTO: 28.9 PG (ref 26.8–34.3)
MCHC RBC AUTO-ENTMCNC: 32.8 G/DL (ref 31.4–37.4)
MCV RBC AUTO: 88 FL (ref 82–98)
MONOCYTES # BLD AUTO: 0.53 THOUSAND/ÂΜL (ref 0.17–1.22)
MONOCYTES NFR BLD AUTO: 6 % (ref 4–12)
NEUTROPHILS # BLD AUTO: 5 THOUSANDS/ÂΜL (ref 1.85–7.62)
NEUTS SEG NFR BLD AUTO: 60 % (ref 43–75)
NITRITE UR QL STRIP: NEGATIVE
NON-SQ EPI CELLS URNS QL MICRO: NORMAL /HPF
NRBC BLD AUTO-RTO: 0 /100 WBCS
O2 CT BLDV-SCNC: 16.5 ML/DL
PCO2 BLDV: 28.6 MM HG (ref 42–50)
PH BLDV: 7.29 [PH] (ref 7.3–7.4)
PH UR STRIP.AUTO: 5 [PH]
PLATELET # BLD AUTO: 217 THOUSANDS/UL (ref 149–390)
PMV BLD AUTO: 13.2 FL (ref 8.9–12.7)
PO2 BLDV: 46.7 MM HG (ref 35–45)
POTASSIUM SERPL-SCNC: 4.1 MMOL/L (ref 3.5–5.3)
PROT SERPL-MCNC: 7.9 G/DL (ref 6.4–8.4)
PROT UR STRIP-MCNC: NEGATIVE MG/DL
PROTHROMBIN TIME: 13.3 SECONDS (ref 11.6–14.5)
RBC # BLD AUTO: 4.88 MILLION/UL (ref 3.81–5.12)
RBC #/AREA URNS AUTO: NORMAL /HPF
RSV RNA RESP QL NAA+PROBE: NEGATIVE
SARS-COV-2 RNA RESP QL NAA+PROBE: NEGATIVE
SODIUM SERPL-SCNC: 129 MMOL/L (ref 135–147)
SP GR UR STRIP.AUTO: 1.03 (ref 1–1.03)
TSH SERPL DL<=0.05 MIU/L-ACNC: 2.49 UIU/ML (ref 0.45–4.5)
UROBILINOGEN UR STRIP-ACNC: <2 MG/DL
WBC # BLD AUTO: 8.36 THOUSAND/UL (ref 4.31–10.16)
WBC #/AREA URNS AUTO: NORMAL /HPF

## 2024-01-14 PROCEDURE — 81001 URINALYSIS AUTO W/SCOPE: CPT

## 2024-01-14 PROCEDURE — 82948 REAGENT STRIP/BLOOD GLUCOSE: CPT

## 2024-01-14 PROCEDURE — 99291 CRITICAL CARE FIRST HOUR: CPT | Performed by: EMERGENCY MEDICINE

## 2024-01-14 PROCEDURE — 96375 TX/PRO/DX INJ NEW DRUG ADDON: CPT

## 2024-01-14 PROCEDURE — 81025 URINE PREGNANCY TEST: CPT

## 2024-01-14 PROCEDURE — 87040 BLOOD CULTURE FOR BACTERIA: CPT | Performed by: EMERGENCY MEDICINE

## 2024-01-14 PROCEDURE — 83735 ASSAY OF MAGNESIUM: CPT

## 2024-01-14 PROCEDURE — 83605 ASSAY OF LACTIC ACID: CPT | Performed by: EMERGENCY MEDICINE

## 2024-01-14 PROCEDURE — 99285 EMERGENCY DEPT VISIT HI MDM: CPT

## 2024-01-14 PROCEDURE — 85025 COMPLETE CBC W/AUTO DIFF WBC: CPT

## 2024-01-14 PROCEDURE — 85730 THROMBOPLASTIN TIME PARTIAL: CPT | Performed by: EMERGENCY MEDICINE

## 2024-01-14 PROCEDURE — 87086 URINE CULTURE/COLONY COUNT: CPT

## 2024-01-14 PROCEDURE — 36415 COLL VENOUS BLD VENIPUNCTURE: CPT

## 2024-01-14 PROCEDURE — 82805 BLOOD GASES W/O2 SATURATION: CPT

## 2024-01-14 PROCEDURE — 96376 TX/PRO/DX INJ SAME DRUG ADON: CPT

## 2024-01-14 PROCEDURE — 96366 THER/PROPH/DIAG IV INF ADDON: CPT

## 2024-01-14 PROCEDURE — 71045 X-RAY EXAM CHEST 1 VIEW: CPT

## 2024-01-14 PROCEDURE — 84484 ASSAY OF TROPONIN QUANT: CPT | Performed by: EMERGENCY MEDICINE

## 2024-01-14 PROCEDURE — G1004 CDSM NDSC: HCPCS

## 2024-01-14 PROCEDURE — 96368 THER/DIAG CONCURRENT INF: CPT

## 2024-01-14 PROCEDURE — 83036 HEMOGLOBIN GLYCOSYLATED A1C: CPT

## 2024-01-14 PROCEDURE — 96365 THER/PROPH/DIAG IV INF INIT: CPT

## 2024-01-14 PROCEDURE — 82010 KETONE BODYS QUAN: CPT

## 2024-01-14 PROCEDURE — 74177 CT ABD & PELVIS W/CONTRAST: CPT

## 2024-01-14 PROCEDURE — 71260 CT THORAX DX C+: CPT

## 2024-01-14 PROCEDURE — 85379 FIBRIN DEGRADATION QUANT: CPT | Performed by: EMERGENCY MEDICINE

## 2024-01-14 PROCEDURE — 93005 ELECTROCARDIOGRAM TRACING: CPT

## 2024-01-14 PROCEDURE — 80053 COMPREHEN METABOLIC PANEL: CPT

## 2024-01-14 PROCEDURE — 84443 ASSAY THYROID STIM HORMONE: CPT | Performed by: EMERGENCY MEDICINE

## 2024-01-14 PROCEDURE — 85610 PROTHROMBIN TIME: CPT | Performed by: EMERGENCY MEDICINE

## 2024-01-14 PROCEDURE — 83690 ASSAY OF LIPASE: CPT

## 2024-01-14 PROCEDURE — 0241U HB NFCT DS VIR RESP RNA 4 TRGT: CPT

## 2024-01-14 RX ORDER — MAGNESIUM SULFATE HEPTAHYDRATE 40 MG/ML
2 INJECTION, SOLUTION INTRAVENOUS ONCE
Status: COMPLETED | OUTPATIENT
Start: 2024-01-14 | End: 2024-01-15

## 2024-01-14 RX ORDER — POTASSIUM CHLORIDE 20 MEQ/1
40 TABLET, EXTENDED RELEASE ORAL ONCE
Status: DISCONTINUED | OUTPATIENT
Start: 2024-01-14 | End: 2024-01-14

## 2024-01-14 RX ORDER — MORPHINE SULFATE 4 MG/ML
4 INJECTION, SOLUTION INTRAMUSCULAR; INTRAVENOUS ONCE
Status: COMPLETED | OUTPATIENT
Start: 2024-01-14 | End: 2024-01-14

## 2024-01-14 RX ORDER — ONDANSETRON 2 MG/ML
4 INJECTION INTRAMUSCULAR; INTRAVENOUS ONCE
Status: COMPLETED | OUTPATIENT
Start: 2024-01-14 | End: 2024-01-14

## 2024-01-14 RX ORDER — POTASSIUM CHLORIDE 14.9 MG/ML
20 INJECTION INTRAVENOUS ONCE
Status: COMPLETED | OUTPATIENT
Start: 2024-01-15 | End: 2024-01-15

## 2024-01-14 RX ADMIN — ONDANSETRON 4 MG: 2 INJECTION INTRAMUSCULAR; INTRAVENOUS at 22:39

## 2024-01-14 RX ADMIN — IOHEXOL 80 ML: 350 INJECTION, SOLUTION INTRAVENOUS at 23:31

## 2024-01-14 RX ADMIN — MAGNESIUM SULFATE HEPTAHYDRATE 2 G: 40 INJECTION, SOLUTION INTRAVENOUS at 23:51

## 2024-01-14 RX ADMIN — MORPHINE SULFATE 4 MG: 4 INJECTION INTRAVENOUS at 22:39

## 2024-01-14 RX ADMIN — MORPHINE SULFATE 2 MG: 2 INJECTION, SOLUTION INTRAMUSCULAR; INTRAVENOUS at 23:55

## 2024-01-14 RX ADMIN — SODIUM CHLORIDE, SODIUM LACTATE, POTASSIUM CHLORIDE, AND CALCIUM CHLORIDE 2000 ML: .6; .31; .03; .02 INJECTION, SOLUTION INTRAVENOUS at 22:40

## 2024-01-14 NOTE — Clinical Note
Case was discussed with milton and the patient's admission status was agreed to be Admission Status: inpatient status to the service of Dr. rose .

## 2024-01-15 PROBLEM — M32.9 LUPUS (HCC): Status: ACTIVE | Noted: 2024-01-15

## 2024-01-15 PROBLEM — IMO0002 LUPUS: Status: ACTIVE | Noted: 2024-01-15

## 2024-01-15 LAB
ALBUMIN SERPL BCP-MCNC: 3.2 G/DL (ref 3.5–5)
ALP SERPL-CCNC: 41 U/L (ref 34–104)
ALT SERPL W P-5'-P-CCNC: 12 U/L (ref 7–52)
ANION GAP SERPL CALCULATED.3IONS-SCNC: 14 MMOL/L
ANION GAP SERPL CALCULATED.3IONS-SCNC: 7 MMOL/L
ANION GAP SERPL CALCULATED.3IONS-SCNC: 8 MMOL/L
AST SERPL W P-5'-P-CCNC: 13 U/L (ref 13–39)
BACTERIA UR CULT: NORMAL
BASOPHILS # BLD AUTO: 0.03 THOUSANDS/ÂΜL (ref 0–0.1)
BASOPHILS NFR BLD AUTO: 0 % (ref 0–1)
BILIRUB DIRECT SERPL-MCNC: 0.11 MG/DL (ref 0–0.2)
BILIRUB SERPL-MCNC: 0.63 MG/DL (ref 0.2–1)
BUN SERPL-MCNC: 11 MG/DL (ref 5–25)
BUN SERPL-MCNC: 6 MG/DL (ref 5–25)
BUN SERPL-MCNC: 8 MG/DL (ref 5–25)
CALCIUM SERPL-MCNC: 8 MG/DL (ref 8.4–10.2)
CALCIUM SERPL-MCNC: 8.4 MG/DL (ref 8.4–10.2)
CALCIUM SERPL-MCNC: 8.6 MG/DL (ref 8.4–10.2)
CHLORIDE SERPL-SCNC: 104 MMOL/L (ref 96–108)
CHLORIDE SERPL-SCNC: 105 MMOL/L (ref 96–108)
CHLORIDE SERPL-SCNC: 106 MMOL/L (ref 96–108)
CO2 SERPL-SCNC: 17 MMOL/L (ref 21–32)
CO2 SERPL-SCNC: 23 MMOL/L (ref 21–32)
CO2 SERPL-SCNC: 23 MMOL/L (ref 21–32)
CREAT SERPL-MCNC: 0.59 MG/DL (ref 0.6–1.3)
CREAT SERPL-MCNC: 0.64 MG/DL (ref 0.6–1.3)
CREAT SERPL-MCNC: 0.77 MG/DL (ref 0.6–1.3)
EOSINOPHIL # BLD AUTO: 0.06 THOUSAND/ÂΜL (ref 0–0.61)
EOSINOPHIL NFR BLD AUTO: 1 % (ref 0–6)
ERYTHROCYTE [DISTWIDTH] IN BLOOD BY AUTOMATED COUNT: 13.1 % (ref 11.6–15.1)
EST. AVERAGE GLUCOSE BLD GHB EST-MCNC: 229 MG/DL
GFR SERPL CREATININE-BSD FRML MDRD: 106 ML/MIN/1.73SQ M
GFR SERPL CREATININE-BSD FRML MDRD: 123 ML/MIN/1.73SQ M
GFR SERPL CREATININE-BSD FRML MDRD: 126 ML/MIN/1.73SQ M
GLUCOSE SERPL-MCNC: 138 MG/DL (ref 65–140)
GLUCOSE SERPL-MCNC: 170 MG/DL (ref 65–140)
GLUCOSE SERPL-MCNC: 181 MG/DL (ref 65–140)
GLUCOSE SERPL-MCNC: 182 MG/DL (ref 65–140)
GLUCOSE SERPL-MCNC: 183 MG/DL (ref 65–140)
GLUCOSE SERPL-MCNC: 185 MG/DL (ref 65–140)
GLUCOSE SERPL-MCNC: 185 MG/DL (ref 65–140)
GLUCOSE SERPL-MCNC: 189 MG/DL (ref 65–140)
GLUCOSE SERPL-MCNC: 198 MG/DL (ref 65–140)
GLUCOSE SERPL-MCNC: 201 MG/DL (ref 65–140)
GLUCOSE SERPL-MCNC: 207 MG/DL (ref 65–140)
GLUCOSE SERPL-MCNC: 207 MG/DL (ref 65–140)
GLUCOSE SERPL-MCNC: 211 MG/DL (ref 65–140)
GLUCOSE SERPL-MCNC: 213 MG/DL (ref 65–140)
GLUCOSE SERPL-MCNC: 231 MG/DL (ref 65–140)
GLUCOSE SERPL-MCNC: 251 MG/DL (ref 65–140)
GLUCOSE SERPL-MCNC: 290 MG/DL (ref 65–140)
GLUCOSE SERPL-MCNC: 338 MG/DL (ref 65–140)
GLUCOSE SERPL-MCNC: 344 MG/DL (ref 65–140)
GLUCOSE SERPL-MCNC: 375 MG/DL (ref 65–140)
GLUCOSE SERPL-MCNC: 423 MG/DL (ref 65–140)
GLUCOSE SERPL-MCNC: 74 MG/DL (ref 65–140)
GLUCOSE SERPL-MCNC: 90 MG/DL (ref 65–140)
HBA1C MFR BLD: 9.6 %
HCT VFR BLD AUTO: 33.1 % (ref 34.8–46.1)
HGB BLD-MCNC: 11.2 G/DL (ref 11.5–15.4)
IMM GRANULOCYTES # BLD AUTO: 0.02 THOUSAND/UL (ref 0–0.2)
IMM GRANULOCYTES NFR BLD AUTO: 0 % (ref 0–2)
LACTATE SERPL-SCNC: 1.3 MMOL/L (ref 0.5–2)
LACTATE SERPL-SCNC: 3.2 MMOL/L (ref 0.5–2)
LYMPHOCYTES # BLD AUTO: 2.4 THOUSANDS/ÂΜL (ref 0.6–4.47)
LYMPHOCYTES NFR BLD AUTO: 36 % (ref 14–44)
MAGNESIUM SERPL-MCNC: 1.8 MG/DL (ref 1.9–2.7)
MAGNESIUM SERPL-MCNC: 1.9 MG/DL (ref 1.9–2.7)
MAGNESIUM SERPL-MCNC: 2.4 MG/DL (ref 1.9–2.7)
MCH RBC QN AUTO: 29.2 PG (ref 26.8–34.3)
MCHC RBC AUTO-ENTMCNC: 33.8 G/DL (ref 31.4–37.4)
MCV RBC AUTO: 86 FL (ref 82–98)
MONOCYTES # BLD AUTO: 0.56 THOUSAND/ÂΜL (ref 0.17–1.22)
MONOCYTES NFR BLD AUTO: 8 % (ref 4–12)
NEUTROPHILS # BLD AUTO: 3.68 THOUSANDS/ÂΜL (ref 1.85–7.62)
NEUTS SEG NFR BLD AUTO: 55 % (ref 43–75)
NRBC BLD AUTO-RTO: 0 /100 WBCS
PHOSPHATE SERPL-MCNC: 1.1 MG/DL (ref 2.7–4.5)
PHOSPHATE SERPL-MCNC: 2.4 MG/DL (ref 2.7–4.5)
PHOSPHATE SERPL-MCNC: 4.7 MG/DL (ref 2.7–4.5)
PLATELET # BLD AUTO: 180 THOUSANDS/UL (ref 149–390)
PMV BLD AUTO: 12.3 FL (ref 8.9–12.7)
POTASSIUM SERPL-SCNC: 3.5 MMOL/L (ref 3.5–5.3)
POTASSIUM SERPL-SCNC: 3.6 MMOL/L (ref 3.5–5.3)
POTASSIUM SERPL-SCNC: 3.8 MMOL/L (ref 3.5–5.3)
PROT SERPL-MCNC: 5.3 G/DL (ref 6.4–8.4)
RBC # BLD AUTO: 3.84 MILLION/UL (ref 3.81–5.12)
SODIUM SERPL-SCNC: 135 MMOL/L (ref 135–147)
SODIUM SERPL-SCNC: 135 MMOL/L (ref 135–147)
SODIUM SERPL-SCNC: 137 MMOL/L (ref 135–147)
WBC # BLD AUTO: 6.75 THOUSAND/UL (ref 4.31–10.16)

## 2024-01-15 PROCEDURE — 93005 ELECTROCARDIOGRAM TRACING: CPT

## 2024-01-15 PROCEDURE — 84100 ASSAY OF PHOSPHORUS: CPT

## 2024-01-15 PROCEDURE — 82948 REAGENT STRIP/BLOOD GLUCOSE: CPT

## 2024-01-15 PROCEDURE — 96375 TX/PRO/DX INJ NEW DRUG ADDON: CPT

## 2024-01-15 PROCEDURE — 80076 HEPATIC FUNCTION PANEL: CPT | Performed by: NURSE PRACTITIONER

## 2024-01-15 PROCEDURE — 83605 ASSAY OF LACTIC ACID: CPT

## 2024-01-15 PROCEDURE — 83735 ASSAY OF MAGNESIUM: CPT

## 2024-01-15 PROCEDURE — 99254 IP/OBS CNSLTJ NEW/EST MOD 60: CPT | Performed by: INTERNAL MEDICINE

## 2024-01-15 PROCEDURE — 83605 ASSAY OF LACTIC ACID: CPT | Performed by: EMERGENCY MEDICINE

## 2024-01-15 PROCEDURE — 80048 BASIC METABOLIC PNL TOTAL CA: CPT

## 2024-01-15 PROCEDURE — 85025 COMPLETE CBC W/AUTO DIFF WBC: CPT

## 2024-01-15 PROCEDURE — 36415 COLL VENOUS BLD VENIPUNCTURE: CPT

## 2024-01-15 PROCEDURE — 99223 1ST HOSP IP/OBS HIGH 75: CPT | Performed by: INTERNAL MEDICINE

## 2024-01-15 RX ORDER — LANOLIN ALCOHOL/MO/W.PET/CERES
800 CREAM (GRAM) TOPICAL ONCE
Status: COMPLETED | OUTPATIENT
Start: 2024-01-15 | End: 2024-01-15

## 2024-01-15 RX ORDER — SODIUM CHLORIDE, SODIUM GLUCONATE, SODIUM ACETATE, POTASSIUM CHLORIDE, MAGNESIUM CHLORIDE, SODIUM PHOSPHATE, DIBASIC, AND POTASSIUM PHOSPHATE .53; .5; .37; .037; .03; .012; .00082 G/100ML; G/100ML; G/100ML; G/100ML; G/100ML; G/100ML; G/100ML
500 INJECTION, SOLUTION INTRAVENOUS CONTINUOUS
Status: DISCONTINUED | OUTPATIENT
Start: 2024-01-15 | End: 2024-01-15

## 2024-01-15 RX ORDER — SODIUM CHLORIDE, SODIUM GLUCONATE, SODIUM ACETATE, POTASSIUM CHLORIDE, MAGNESIUM CHLORIDE, SODIUM PHOSPHATE, DIBASIC, AND POTASSIUM PHOSPHATE .53; .5; .37; .037; .03; .012; .00082 G/100ML; G/100ML; G/100ML; G/100ML; G/100ML; G/100ML; G/100ML
250 INJECTION, SOLUTION INTRAVENOUS CONTINUOUS
Status: DISCONTINUED | OUTPATIENT
Start: 2024-01-15 | End: 2024-01-15

## 2024-01-15 RX ORDER — GABAPENTIN 300 MG/1
600 CAPSULE ORAL DAILY
Status: DISCONTINUED | OUTPATIENT
Start: 2024-01-15 | End: 2024-01-16 | Stop reason: HOSPADM

## 2024-01-15 RX ORDER — LEVOTHYROXINE SODIUM 0.03 MG/1
25 TABLET ORAL
Status: DISCONTINUED | OUTPATIENT
Start: 2024-01-15 | End: 2024-01-16 | Stop reason: HOSPADM

## 2024-01-15 RX ORDER — ACETAMINOPHEN 325 MG/1
975 TABLET ORAL EVERY 6 HOURS PRN
Status: DISCONTINUED | OUTPATIENT
Start: 2024-01-15 | End: 2024-01-16 | Stop reason: HOSPADM

## 2024-01-15 RX ORDER — FAMOTIDINE 20 MG/1
20 TABLET, FILM COATED ORAL DAILY
Status: DISCONTINUED | OUTPATIENT
Start: 2024-01-15 | End: 2024-01-16 | Stop reason: HOSPADM

## 2024-01-15 RX ORDER — ENOXAPARIN SODIUM 100 MG/ML
40 INJECTION SUBCUTANEOUS DAILY
Status: DISCONTINUED | OUTPATIENT
Start: 2024-01-15 | End: 2024-01-16 | Stop reason: HOSPADM

## 2024-01-15 RX ORDER — ONDANSETRON 2 MG/ML
4 INJECTION INTRAMUSCULAR; INTRAVENOUS EVERY 6 HOURS PRN
Status: DISCONTINUED | OUTPATIENT
Start: 2024-01-15 | End: 2024-01-16 | Stop reason: HOSPADM

## 2024-01-15 RX ORDER — HYDROXYCHLOROQUINE SULFATE 200 MG/1
200 TABLET, FILM COATED ORAL 2 TIMES DAILY
Status: DISCONTINUED | OUTPATIENT
Start: 2024-01-15 | End: 2024-01-16 | Stop reason: HOSPADM

## 2024-01-15 RX ORDER — LORAZEPAM 0.5 MG/1
0.5 TABLET ORAL 2 TIMES DAILY PRN
Status: DISCONTINUED | OUTPATIENT
Start: 2024-01-15 | End: 2024-01-16 | Stop reason: HOSPADM

## 2024-01-15 RX ORDER — FOLIC ACID 1 MG/1
1 TABLET ORAL DAILY
Status: DISCONTINUED | OUTPATIENT
Start: 2024-01-15 | End: 2024-01-16 | Stop reason: HOSPADM

## 2024-01-15 RX ORDER — CHLORHEXIDINE GLUCONATE ORAL RINSE 1.2 MG/ML
15 SOLUTION DENTAL EVERY 12 HOURS SCHEDULED
Status: DISCONTINUED | OUTPATIENT
Start: 2024-01-15 | End: 2024-01-16 | Stop reason: HOSPADM

## 2024-01-15 RX ORDER — CALCIUM GLUCONATE 20 MG/ML
2 INJECTION, SOLUTION INTRAVENOUS ONCE
Status: COMPLETED | OUTPATIENT
Start: 2024-01-15 | End: 2024-01-15

## 2024-01-15 RX ORDER — SODIUM CHLORIDE, SODIUM GLUCONATE, SODIUM ACETATE, POTASSIUM CHLORIDE, MAGNESIUM CHLORIDE, SODIUM PHOSPHATE, DIBASIC, AND POTASSIUM PHOSPHATE .53; .5; .37; .037; .03; .012; .00082 G/100ML; G/100ML; G/100ML; G/100ML; G/100ML; G/100ML; G/100ML
1000 INJECTION, SOLUTION INTRAVENOUS ONCE
Status: COMPLETED | OUTPATIENT
Start: 2024-01-15 | End: 2024-01-15

## 2024-01-15 RX ORDER — DEXTROSE, SODIUM CHLORIDE, SODIUM LACTATE, POTASSIUM CHLORIDE, AND CALCIUM CHLORIDE 5; .6; .31; .03; .02 G/100ML; G/100ML; G/100ML; G/100ML; G/100ML
250 INJECTION, SOLUTION INTRAVENOUS CONTINUOUS
Status: DISCONTINUED | OUTPATIENT
Start: 2024-01-15 | End: 2024-01-15

## 2024-01-15 RX ORDER — SODIUM CHLORIDE, SODIUM GLUCONATE, SODIUM ACETATE, POTASSIUM CHLORIDE, MAGNESIUM CHLORIDE, SODIUM PHOSPHATE, DIBASIC, AND POTASSIUM PHOSPHATE .53; .5; .37; .037; .03; .012; .00082 G/100ML; G/100ML; G/100ML; G/100ML; G/100ML; G/100ML; G/100ML
100 INJECTION, SOLUTION INTRAVENOUS CONTINUOUS
Status: DISCONTINUED | OUTPATIENT
Start: 2024-01-15 | End: 2024-01-15

## 2024-01-15 RX ORDER — POTASSIUM CHLORIDE 20 MEQ/1
40 TABLET, EXTENDED RELEASE ORAL ONCE
Status: COMPLETED | OUTPATIENT
Start: 2024-01-15 | End: 2024-01-15

## 2024-01-15 RX ADMIN — SODIUM CHLORIDE 5.9 UNITS/HR: 9 INJECTION, SOLUTION INTRAVENOUS at 00:39

## 2024-01-15 RX ADMIN — SODIUM CHLORIDE, SODIUM LACTATE, POTASSIUM CHLORIDE, AND CALCIUM CHLORIDE 1000 ML: .6; .31; .03; .02 INJECTION, SOLUTION INTRAVENOUS at 00:21

## 2024-01-15 RX ADMIN — POTASSIUM CHLORIDE 40 MEQ: 1500 TABLET, EXTENDED RELEASE ORAL at 09:37

## 2024-01-15 RX ADMIN — HYDROXYCHLOROQUINE SULFATE 200 MG: 200 TABLET, FILM COATED ORAL at 03:09

## 2024-01-15 RX ADMIN — GABAPENTIN 600 MG: 300 CAPSULE ORAL at 03:09

## 2024-01-15 RX ADMIN — ACETAMINOPHEN 975 MG: 325 TABLET, FILM COATED ORAL at 08:16

## 2024-01-15 RX ADMIN — HYDROXYCHLOROQUINE SULFATE 200 MG: 200 TABLET, FILM COATED ORAL at 08:16

## 2024-01-15 RX ADMIN — CHLORHEXIDINE GLUCONATE 15 ML: 1.2 SOLUTION ORAL at 20:11

## 2024-01-15 RX ADMIN — DEXTROSE, SODIUM CHLORIDE, SODIUM LACTATE, POTASSIUM CHLORIDE, AND CALCIUM CHLORIDE 250 ML/HR: 5; .6; .31; .03; .02 INJECTION, SOLUTION INTRAVENOUS at 05:35

## 2024-01-15 RX ADMIN — POTASSIUM CHLORIDE 20 MEQ: 14.9 INJECTION, SOLUTION INTRAVENOUS at 00:33

## 2024-01-15 RX ADMIN — SODIUM CHLORIDE 5 UNITS/HR: 9 INJECTION, SOLUTION INTRAVENOUS at 21:07

## 2024-01-15 RX ADMIN — Medication 800 MG: at 09:37

## 2024-01-15 RX ADMIN — FAMOTIDINE 20 MG: 20 TABLET, FILM COATED ORAL at 08:15

## 2024-01-15 RX ADMIN — DEXTROSE, SODIUM CHLORIDE, SODIUM LACTATE, POTASSIUM CHLORIDE, AND CALCIUM CHLORIDE 250 ML/HR: 5; .6; .31; .03; .02 INJECTION, SOLUTION INTRAVENOUS at 09:40

## 2024-01-15 RX ADMIN — GABAPENTIN 600 MG: 300 CAPSULE ORAL at 22:12

## 2024-01-15 RX ADMIN — SODIUM CHLORIDE 4 UNITS/HR: 9 INJECTION, SOLUTION INTRAVENOUS at 12:16

## 2024-01-15 RX ADMIN — CHLORHEXIDINE GLUCONATE 15 ML: 1.2 SOLUTION ORAL at 03:09

## 2024-01-15 RX ADMIN — FOLIC ACID 1 MG: 1 TABLET ORAL at 14:29

## 2024-01-15 RX ADMIN — ENOXAPARIN SODIUM 40 MG: 40 INJECTION SUBCUTANEOUS at 08:16

## 2024-01-15 RX ADMIN — LEVOTHYROXINE SODIUM 25 MCG: 25 TABLET ORAL at 05:39

## 2024-01-15 RX ADMIN — POTASSIUM PHOSPHATE, MONOBASIC POTASSIUM PHOSPHATE, DIBASIC 30 MMOL: 224; 236 INJECTION, SOLUTION, CONCENTRATE INTRAVENOUS at 03:09

## 2024-01-15 RX ADMIN — INSULIN HUMAN 6 UNITS: 100 INJECTION, SOLUTION PARENTERAL at 00:00

## 2024-01-15 RX ADMIN — ONDANSETRON 4 MG: 2 INJECTION INTRAMUSCULAR; INTRAVENOUS at 05:49

## 2024-01-15 RX ADMIN — CALCIUM GLUCONATE 2 G: 20 INJECTION, SOLUTION INTRAVENOUS at 05:35

## 2024-01-15 RX ADMIN — SODIUM CHLORIDE, SODIUM GLUCONATE, SODIUM ACETATE, POTASSIUM CHLORIDE, MAGNESIUM CHLORIDE, SODIUM PHOSPHATE, DIBASIC, AND POTASSIUM PHOSPHATE 100 ML/HR: .53; .5; .37; .037; .03; .012; .00082 INJECTION, SOLUTION INTRAVENOUS at 12:16

## 2024-01-15 RX ADMIN — SODIUM CHLORIDE, SODIUM GLUCONATE, SODIUM ACETATE, POTASSIUM CHLORIDE, MAGNESIUM CHLORIDE, SODIUM PHOSPHATE, DIBASIC, AND POTASSIUM PHOSPHATE 1000 ML: .53; .5; .37; .037; .03; .012; .00082 INJECTION, SOLUTION INTRAVENOUS at 09:37

## 2024-01-15 RX ADMIN — CHLORHEXIDINE GLUCONATE 15 ML: 1.2 SOLUTION ORAL at 08:16

## 2024-01-15 RX ADMIN — SODIUM CHLORIDE, SODIUM GLUCONATE, SODIUM ACETATE, POTASSIUM CHLORIDE, MAGNESIUM CHLORIDE, SODIUM PHOSPHATE, DIBASIC, AND POTASSIUM PHOSPHATE 250 ML/HR: .53; .5; .37; .037; .03; .012; .00082 INJECTION, SOLUTION INTRAVENOUS at 03:10

## 2024-01-15 RX ADMIN — DEXTROSE, SODIUM CHLORIDE, SODIUM LACTATE, POTASSIUM CHLORIDE, AND CALCIUM CHLORIDE 250 ML/HR: 5; .6; .31; .03; .02 INJECTION, SOLUTION INTRAVENOUS at 01:41

## 2024-01-15 RX ADMIN — HYDROXYCHLOROQUINE SULFATE 200 MG: 200 TABLET, FILM COATED ORAL at 22:12

## 2024-01-15 RX ADMIN — ONDANSETRON 4 MG: 2 INJECTION INTRAMUSCULAR; INTRAVENOUS at 15:00

## 2024-01-15 RX ADMIN — Medication 800 MG: at 05:38

## 2024-01-15 NOTE — ASSESSMENT & PLAN NOTE
Lab Results   Component Value Date    HGBA1C 8.7 (H) 10/20/2023       Recent Labs     01/14/24  2218 01/14/24  2359 01/15/24  0033 01/15/24  0105   POCGLU >500* 423* 375* 251*       Blood Sugar Average: Last 72 hrs:  (P) [id number]    History of type 1 DM. Pt had been using Omni pod, then started having issues with leaking and adhesive so she switched back to basal bolus with carb correction at meals a few weeks ago. States she has been having trouble controlling sugars for about a week and today she developed N/V/abd pain along with some chest pain, SOB and urinary frequency and her glucometer was reading high so she came to ED. Presenting glucose was 747, venous pH 7.29, anion gap 25. She has gotten 2L and 6 units insulin in ED. Admitted to ICU for DKA protocol insulin drip and IVF.    Plan  DKA insulin drip  BMP, Magnesium, Phos q 4 hrs with repletion as needed  IVF per DKA protocol  HBA1c  NPO except sips with meds until gap closed x2

## 2024-01-15 NOTE — PLAN OF CARE
Problem: PAIN - ADULT  Goal: Verbalizes/displays adequate comfort level or baseline comfort level  Description: Interventions:  - Encourage patient to monitor pain and request assistance  - Assess pain using appropriate pain scale  - Administer analgesics based on type and severity of pain and evaluate response  - Implement non-pharmacological measures as appropriate and evaluate response  - Consider cultural and social influences on pain and pain management  - Notify physician/advanced practitioner if interventions unsuccessful or patient reports new pain  Outcome: Progressing     Problem: INFECTION - ADULT  Goal: Absence or prevention of progression during hospitalization  Description: INTERVENTIONS:  - Assess and monitor for signs and symptoms of infection  - Monitor lab/diagnostic results  - Monitor all insertion sites, i.e. indwelling lines, tubes, and drains  - Monitor endotracheal if appropriate and nasal secretions for changes in amount and color  - [place] appropriate cooling/warming therapies per order  - Administer medications as ordered  - Instruct and encourage patient and family to use good hand hygiene technique  - Identify and instruct in appropriate isolation precautions for identified infection/condition  Outcome: Progressing  Goal: Absence of fever/infection during neutropenic period  Description: INTERVENTIONS:  - Monitor WBC    Outcome: Progressing     Problem: SAFETY ADULT  Goal: Patient will remain free of falls  Description: INTERVENTIONS:  - Educate patient/family on patient safety including physical limitations  - Instruct patient to call for assistance with activity   - Consult OT/PT to assist with strengthening/mobility   - Keep Call bell within reach  - Keep bed low and locked with side rails adjusted as appropriate  - Keep care items and personal belongings within reach  - Initiate and maintain comfort rounds  - Make Fall Risk Sign visible to staff  - Offer Toileting every 2 Hours,  in advance of need  - Apply yellow socks and bracelet for high fall risk patients  - Consider moving patient to room near nurses station  Outcome: Progressing  Goal: Maintain or return to baseline ADL function  Description: INTERVENTIONS:  -  Assess patient's ability to carry out ADLs; assess patient's baseline for ADL function and identify physical deficits which impact ability to perform ADLs (bathing, care of mouth/teeth, toileting, grooming, dressing, etc.)  - Assess/evaluate cause of self-care deficits   - Assess range of motion  - Assess patient's mobility; develop plan if impaired  - Assess patient's need for assistive devices and provide as appropriate  - Encourage maximum independence but intervene and supervise when necessary  - Involve family in performance of ADLs  - Assess for home care needs following discharge   - Consider OT consult to assist with ADL evaluation and planning for discharge  - Provide patient education as appropriate  Outcome: Progressing  Goal: Maintains/Returns to pre admission functional level  Description: INTERVENTIONS:  - Perform AM-PAC 6 Click Basic Mobility/ Daily Activity assessment daily.  - Set and communicate daily mobility goal to care team and patient/family/caregiver.   - Collaborate with rehabilitation services on mobility goals if consulted  - Ambulate patient 3 times a day  - Out of bed to chair 3 times a day   - Out of bed for meals 3 times a day  - Out of bed for toileting  - Record patient progress and toleration of activity level   Outcome: Progressing     Problem: DISCHARGE PLANNING  Goal: Discharge to home or other facility with appropriate resources  Description: INTERVENTIONS:  - Identify barriers to discharge w/patient and caregiver  - Arrange for needed discharge resources and transportation as appropriate  - Identify discharge learning needs (meds, wound care, etc.)  - Arrange for interpretive services to assist at discharge as needed  - Refer to Case  Management Department for coordinating discharge planning if the patient needs post-hospital services based on physician/advanced practitioner order or complex needs related to functional status, cognitive ability, or social support system  Outcome: Progressing     Problem: Knowledge Deficit  Goal: Patient/family/caregiver demonstrates understanding of disease process, treatment plan, medications, and discharge instructions  Description: Complete learning assessment and assess knowledge base.  Interventions:  - Provide teaching at level of understanding  - Provide teaching via preferred learning methods  Outcome: Progressing     Problem: METABOLIC, FLUID AND ELECTROLYTES - ADULT  Goal: Electrolytes maintained within normal limits  Description: INTERVENTIONS:  - Monitor labs and assess patient for signs and symptoms of electrolyte imbalances  - Administer electrolyte replacement as ordered  - Monitor response to electrolyte replacements, including repeat lab results as appropriate  - Instruct patient on fluid and nutrition as appropriate  Outcome: Progressing  Goal: Fluid balance maintained  Description: INTERVENTIONS:  - Monitor labs   - Monitor I/O and WT  - Instruct patient on fluid and nutrition as appropriate  - Assess for signs & symptoms of volume excess or deficit  Outcome: Progressing  Goal: Glucose maintained within target range  Description: INTERVENTIONS:  - Monitor Blood Glucose as ordered  - Assess for signs and symptoms of hyperglycemia and hypoglycemia  - Administer ordered medications to maintain glucose within target range  - Assess nutritional intake and initiate nutrition service referral as needed  Outcome: Progressing     Problem: Nutrition/Hydration-ADULT  Goal: Nutrient/Hydration intake appropriate for improving, restoring or maintaining nutritional needs  Description: Monitor and assess patient's nutrition/hydration status for malnutrition. Collaborate with interdisciplinary team and initiate  plan and interventions as ordered.  Monitor patient's weight and dietary intake as ordered or per policy. Utilize nutrition screening tool and intervene as necessary. Determine patient's food preferences and provide high-protein, high-caloric foods as appropriate.     INTERVENTIONS:  - Monitor oral intake, urinary output, labs, and treatment plans  - Assess nutrition and hydration status and recommend course of action  - Evaluate amount of meals eaten  - Assist patient with eating if necessary   - Allow adequate time for meals  - Recommend/ encourage appropriate diets, oral nutritional supplements, and vitamin/mineral supplements  - Order, calculate, and assess calorie counts as needed  - Recommend, monitor, and adjust tube feedings and TPN/PPN based on assessed needs  - Assess need for intravenous fluids  - Provide specific nutrition/hydration education as appropriate  - Include patient/family/caregiver in decisions related to nutrition  Outcome: Progressing

## 2024-01-15 NOTE — ED PROCEDURE NOTE
PROCEDURE  CriticalCare Time    Date/Time: 1/15/2024 12:03 AM    Performed by: Jn Borja MD  Authorized by: Jn Borja MD    Critical care provider statement:     Critical care time (minutes):  35    Critical care time was exclusive of:  Separately billable procedures and treating other patients and teaching time    Critical care was necessary to treat or prevent imminent or life-threatening deterioration of the following conditions:  Endocrine crisis (DKA)    Critical care was time spent personally by me on the following activities:  Obtaining history from patient or surrogate, development of treatment plan with patient or surrogate, evaluation of patient's response to treatment, examination of patient, ordering and performing treatments and interventions, ordering and review of laboratory studies, ordering and review of radiographic studies, re-evaluation of patient's condition and review of old charts    I assumed direction of critical care for this patient from another provider in my specialty: no         Jn Borja MD  01/15/24 0003

## 2024-01-15 NOTE — H&P
[facility]  H&P  Name: Jenny Rodrigues 28 y.o. female I MRN: [id number]  Unit/Bed#: ICU 05 I Date of Admission: 1/14/2024   Date of Service: 1/15/2024 I Hospital Day: 0      Assessment/Plan   * Diabetic ketoacidosis associated with type 1 diabetes mellitus (HCC)  Assessment & Plan  Lab Results   Component Value Date    HGBA1C 8.7 (H) 10/20/2023       Recent Labs     01/14/24  2218 01/14/24  2359 01/15/24  0033 01/15/24  0105   POCGLU >500* 423* 375* 251*       Blood Sugar Average: Last 72 hrs:  (P) [id number]    History of type 1 DM. Pt had been using Omni pod, then started having issues with leaking and adhesive so she switched back to basal bolus with carb correction at meals a few weeks ago. States she has been having trouble controlling sugars for about a week and today she developed N/V/abd pain along with some chest pain, SOB and urinary frequency and her glucometer was reading high so she came to ED. Presenting glucose was 747, venous pH 7.29, anion gap 25. She has gotten 2L and 6 units insulin in ED. Admitted to ICU for DKA protocol insulin drip and IVF.    Plan  DKA insulin drip  BMP, Magnesium, Phos q 4 hrs with repletion as needed  IVF per DKA protocol  HBA1c  NPO except sips with meds until gap closed x2    Lupus (HCC)  Assessment & Plan  History of lupus. Pt following with endocrinologist at [facility]. On weekly methotrexate injections and hydroxychlorquine 200mg BID    Plan:  Continue home hydroxychorquine     Hashimoto's thyroiditis  Assessment & Plan  Pt is on levothyroxine 25mcg daily   Plan:  Continue home levothyroxine           History of Present Illness     HPI: Jenny Rodrigues is a 28 y.o. who presents with DKA. PMHx type 1 DM, lupus, hashimoto's thyroiditis. Pt states she had been using Omni pod, then started having issues with leaking and adhesive causing skin irritation so she switched back to basal bolus of 38 units tresiba with carb correction at meals a  "few weeks ago. States she has been having trouble controlling sugars for about a week and today she developed N/V/abd pain along with some chest pain, SOB and urinary frequency and her glucometer was reading \"high\" so she came to ED. Presenting glucose was 747, venous pH 7.29, anion gap 25. She has gotten 3L and 6 units insulin in ED. Admitted to ICU for DKA protocol insulin drip and IVF.    Pt notes she has been in a lupus flair since September that is slowly resolving and she has been under increased stress and she is planning her weeding and getting  in 2 weeks.     History obtained from the patient.  Review of Systems   Constitutional:  Positive for activity change and appetite change.   HENT:  Negative for congestion.    Respiratory:  Negative for cough and shortness of breath.    Cardiovascular:  Negative for chest pain.   Gastrointestinal:  Positive for abdominal pain, nausea and vomiting.   Endocrine: Positive for polyuria.   Genitourinary:  Positive for dysuria, frequency and urgency. Negative for vaginal discharge.   Musculoskeletal:  Positive for back pain and joint swelling.   Skin:  Negative for rash and wound.   Neurological:  Negative for weakness and headaches.     Disposition: Critical care  Historical Information   Past Medical History:  No date: Abdominal pain  No date: Anaphylaxis  No date: Anxiety  No date: Anxiety and depression  12/2020: COVID-19  03/23/2023: Delayed emergence from anesthesia      Comment:  Severe episode of emergence delirium (confused,                combative) after MAC anesthetic on 03/2023 for EGD.                Received versed 2mg, >50mcg precedex, 5mg IV haldol, and                50mcg fentanyl. Waxing and waning episodes of agitation.                Any future anesthetics should NOT be done at [facility].  No date: Delirium, induced by drug      Comment:  anesthesia  No date: Depression  No date: Diabetes mellitus (HCC)  No date: Disease of thyroid gland      " "Comment:  Hashimoto  05/11/2021: DKA, type 1 (HCC)  No date: Eating disorder      Comment:  history of anorexia/bulemia [id number]  No date: Food intolerance  No date: Fracture of fibula      Comment:  R Salter I  No date: Gilbert disease  02/21/2020: Hashimoto's disease  No date: Head injury  No date: Headache(784.0)  No date: Nasal congestion  No date: PTSD (post-traumatic stress disorder)  No date: Rectal bleed  No date: Seizures (HCC)  No date: Type 1 diabetes (HCC) Past Surgical History:  No date: COLONOSCOPY  07/06/2020: KNEE SURGERY; Right  No date: NASAL SEPTOPLASTY W/ TURBINOPLASTY  No date: SINUS SURGERY  No date: SKIN BIOPSY  3/22/2021: TURBINOPLASTY; N/A      Comment:  Procedure: TURBINOPLASTY;  Surgeon: Jn Hidalgo MD;  Location: [facility];  Service: ENT  No date: UPPER GASTROINTESTINAL ENDOSCOPY  No date: WISDOM TOOTH EXTRACTION  No date: WRIST SURGERY      Comment:  left; Excision of ganglion   Current Outpatient Medications   Medication Instructions    aspirin (ECOTRIN LOW STRENGTH) 162 mg, 2 times daily PRN    Azelastine HCl 137 MCG/SPRAY SOLN 2 SPRAYS INTO EACH NOSTRIL 2 (TWO) TIMES A DAY AS NEEDED FOR RHINITIS USE IN EACH NOSTRIL AS DIRECTED    BD Insulin Syringe U/F 31G X 5/16\" 0.5 ML MISC Use as directly with weekly methotrexate injection.    Belimumab (BENLYSTA IV) Intravenous, Every 14 days, Switching to monthly on 12/5    clindamycin (CLEOCIN T) 1 % lotion 1 application., Topical, 2 times daily    doxycycline hyclate (VIBRAMYCIN) 100 mg, Daily    DULoxetine (CYMBALTA) 20 mg capsule Take 1 cap po qam for 1 week and then increase to 2 caps po qam.    folic acid (FOLVITE) 1 mg, Oral, Daily    gabapentin (NEURONTIN) 300 mg, Oral, Daily at bedtime, PRN    Glucagon HCl (Glucagon Emergency) 1 MG/ML SOLR INJECT 1 MG AS DIRECTED AS NEEDED (WHEN PASSED OUT FROM LOW BLOOD SUGAR).    glucagon 1 mg    hydroxychloroquine (PLAQUENIL) 200 mg, Oral, 2 times daily with meals    Insulin " Disposable Pump (Omnipod 5 G6 Pod, Gen 5,) MISC INJECT 1 EACH UNDER THE SKIN EVERY THIRD DAY. E10.65    Insulin Pen Needle (BD PEN NEEDLE NASIM U/F) 32G X 4 MM MISC Does not apply, 4 times daily    ketoconazole (NIZORAL) 2 % shampoo Topical, Once    levonorgestrel-ethinyl estradiol (Altavera) 0.15-30 MG-MCG per tablet 1 tablet, Oral, Daily    levothyroxine 25 mcg, Oral, Daily    LORazepam (ATIVAN) 0.5 mg, Oral, 2 times daily PRN    methotrexate 20 mg, Subcutaneous, Weekly    miconazole 2 % cream 1 Application, Topical, 2 times daily, To affected area    NovoLOG 100 UNIT/ML injection INJECT 3 TIMES A DAY WITH MEALS BASED ON ICR 4 AND ISF 30 FOR TDD 90 UNITS DAILY VIA INSULIN PUMP    OneTouch Verio test strip USE 4 TIMES A DAY BEFORE MEALS AND AT BEDTIME    pancrelipase, Lip-Prot-Amyl, (CREON) 12,000 units capsule 36,000 units of lipase, Oral, 3 times daily with meals, Take 3 capsules prior to each meal and 1 prior to snack    Tresiba FlexTouch 100 units/mL injection pen INJECT 34 UNITS DAILY IN THE EVENT OF PUMP FAILURE E10.65    tretinoin (REFISSA) 0.05 % cream APPLY TO AFFECTED AREAS NIGHTLY IF IRRITATION OCCURS THEN APPLY MOISTURIZER FIRST    tretinoin (RETIN-A) 0.025 % cream Topical, Daily at bedtime    Allergies   Allergen Reactions    Insulin Glargine Other (See Comments)     LANTUS BRAND -Burning and redness under skin       Social History     Tobacco Use    Smoking status: Never     Passive exposure: Never    Smokeless tobacco: Never    Tobacco comments:     Tobacco smoke exposure (Father smokes cigars)   Vaping Use    Vaping status: Never Used   Substance Use Topics    Alcohol use: Yes     Comment: rarely    Drug use: Never    Family History   Problem Relation Age of Onset    Hypertension Mother     Migraines Mother         Headache    Diabetes type II Mother     Varicose Veins Mother     Hyperlipidemia Mother     Diabetes Mother     Arthritis Mother     Depression Mother     Hearing loss Mother     Anxiety  disorder Mother     Eczema Father     Cholelithiasis Father     Hypertension Father     Sarcoidosis Father         Liver    Hyperlipidemia Father     Diabetes Father     Coronary artery disease Father     Nephrolithiasis Father     Cirrhosis Father     Alcohol abuse Father     Thyroid disease Sister     Hashimoto's thyroiditis Sister     Alcohol abuse Brother     Cancer Family     Diabetes Family     Hypertension Family           Objective                            Vitals I/O      Most Recent Min/Max in 24hrs   Temp 99.5 °F (37.5 °C) Temp  Min: 97.9 °F (36.6 °C)  Max: 99.5 °F (37.5 °C)   Pulse 99 Pulse  Min: 94  Max: 117   Resp 20 Resp  Min: 20  Max: 30   /72 BP  Min: 128/72  Max: 136/95   O2 Sat 98 % SpO2  Min: 98 %  Max: 98 %      Intake/Output Summary (Last 24 hours) at 1/15/2024 0222  Last data filed at 1/15/2024 0127  Gross per 24 hour   Intake 3050 ml   Output --   Net 3050 ml       Diet NPO; Sips with meds    Invasive Monitoring           Physical Exam   Physical Exam  Vitals and nursing note reviewed.   Eyes:      Conjunctiva/sclera: Conjunctivae normal.      Pupils: Pupils are equal, round, and reactive to light.   Skin:     General: Skin is warm and dry.      Findings: No wound.   HENT:      Head: Normocephalic and atraumatic.      Nose: No nasal deformity.      Mouth/Throat:      Mouth: Mucous membranes are dry.   Cardiovascular:      Rate and Rhythm: Normal rate and regular rhythm.      Pulses: Normal pulses.      Heart sounds: Normal heart sounds.   Musculoskeletal:         General: No swelling, tenderness, deformity or signs of injury. Normal range of motion.      Right lower leg: No edema.      Left lower leg: No edema.   Abdominal: General: Bowel sounds are normal.      Palpations: Abdomen is soft.      Tenderness: There is no abdominal tenderness. There is no guarding.      Hernia: No hernia is present.   Constitutional:       General: She is not in acute distress.     Appearance: She is  ill-appearing.   Pulmonary:      Effort: Pulmonary effort is normal. No accessory muscle usage, respiratory distress or accessory muscle usage.      Breath sounds: Normal breath sounds. No stridor. No wheezing, rhonchi or rales.   Psychiatric:         Behavior: Behavior is cooperative.   Neurological:      General: No focal deficit present.      Mental Status: She is alert and oriented to person, place and time.      Cranial Nerves: No facial asymmetry.      Motor: Strength full and intact in all extremities.            Diagnostic Studies      EKG: Sinus tach   Imaging: CT chest/abd/pelvis and cxr. All NAD I have personally reviewed pertinent reports.       Medications:  Scheduled PRN   chlorhexidine, 15 mL, Q12H ROQUE  enoxaparin, 40 mg, Daily  gabapentin, 600 mg, Daily  hydroxychloroquine, 200 mg, BID  levothyroxine, 25 mcg, Early Morning  potassium chloride, 20 mEq, Once  potassium phosphate, 30 mmol, Once      acetaminophen, 975 mg, Q6H PRN  LORazepam, 0.5 mg, BID PRN  ondansetron, 4 mg, Q6H PRN       Continuous    dextrose 5% lactated ringer's, 250 mL/hr, Last Rate: 250 mL/hr (01/15/24 0141)  insulin regular (HumuLIN R,NovoLIN R) 1 Units/mL in sodium chloride 0.9 % 100 mL infusion, 0.1-30 Units/hr, Last Rate: 2.9 Units/hr (01/15/24 0106)  multi-electrolyte, 500 mL/hr   Followed by  multi-electrolyte, 250 mL/hr         Labs:    CBC    Recent Labs     01/14/24 2238   WBC 8.36   HGB 14.1   HCT 43.0        BMP    Recent Labs     01/14/24  2238 01/15/24  0100   SODIUM 129* 135   K 4.1 3.8   CL 91* 104   CO2 13* 17*   AGAP 25 14   BUN 15 11   CREATININE 0.96 0.77   CALCIUM 9.6 8.6       Coags    Recent Labs     01/14/24 2243   INR 0.95   PTT 23        Additional Electrolytes  Recent Labs     01/14/24  2238 01/15/24  0100   MG 1.8* 2.4   PHOS  --  1.1*          Blood Gas    No recent results  Recent Labs     01/14/24 2238   PHVEN 7.294*   TWG9QKP 28.6*   PO2VEN 46.7*   SQI1CEW 13.6*   BEVEN -11.3    H1DTUDG 78.2    LFTs  Recent Labs     01/14/24 2238   ALT 18   AST 19   ALKPHOS 75   ALB 4.7   TBILI 1.11*       Infectious  No recent results  Glucose  Recent Labs     01/14/24  2238 01/15/24  0100   GLUC 747* 290*               Anticipated Length of Stay is > 2 midnights  [facility] Carmela Rebolledo MD

## 2024-01-15 NOTE — ED PROVIDER NOTES
"History  Chief Complaint   Patient presents with    Hyperglycemia - Symptomatic     Pt states she has been having issues with her insulin pump, and has been having increased sugars over the past few weeks. Today states she developed CP, N/V and weakness. Pt states she believes she is in DKA.    Chest Pain     27yo F w/ h/o T1DM presenting for hyperglycemia. Of note, for the past couple of weeks the Pt has had issues controlling her blood sugar. She states that her insulin pump has been malfunctioning and she hasn't been getting any insulin from the pump. She was transitioned over to subQ injections. Despite this, her blood sugar has been difficult to control. Earlier this evening she developed sudden onset CP, SOB, abdominal pain, N/V, urinary frequency/urgency. Pt states she's been in DKA several times in the past. She believes it's multifactorial- having an autoimmune condition and being newly diagnosed diabetic. Denies F/C, cough.      History provided by:  Patient      Prior to Admission Medications   Prescriptions Last Dose Informant Patient Reported? Taking?   Azelastine HCl 137 MCG/SPRAY SOLN  Self No No   Si SPRAYS INTO EACH NOSTRIL 2 (TWO) TIMES A DAY AS NEEDED FOR RHINITIS USE IN EACH NOSTRIL AS DIRECTED   BD Insulin Syringe U/F 31G X \" 0.5 ML MISC  Self No No   Sig: Use as directly with weekly methotrexate injection.   Belimumab (BENLYSTA IV)  Self Yes No   Sig: Inject into a catheter in a vein every 14 (fourteen) days Switching to monthly on    DULoxetine (CYMBALTA) 20 mg capsule   No No   Sig: Take 1 cap po qam for 1 week and then increase to 2 caps po qam.   Glucagon HCl (Glucagon Emergency) 1 MG/ML SOLR  Self Yes No   Sig: INJECT 1 MG AS DIRECTED AS NEEDED (WHEN PASSED OUT FROM LOW BLOOD SUGAR).   Insulin Disposable Pump (Omnipod 5 G6 Pod, Gen 5,) MISC  Self Yes No   Sig: INJECT 1 EACH UNDER THE SKIN EVERY THIRD DAY. E10.65   Insulin Pen Needle (BD PEN NEEDLE NASIM U/F) 32G X 4 MM MISC  " Self No No   Sig: by Does not apply route 4 (four) times a day for 180 days   LORazepam (Ativan) 0.5 mg tablet  Self No No   Sig: Take 1 tablet (0.5 mg total) by mouth 2 (two) times a day as needed for anxiety   NovoLOG 100 UNIT/ML injection  Self Yes No   Sig: INJECT 3 TIMES A DAY WITH MEALS BASED ON ICR 4 AND ISF 30 FOR TDD 90 UNITS DAILY VIA INSULIN PUMP   OneTouch Verio test strip  Self Yes No   Sig: USE 4 TIMES A DAY BEFORE MEALS AND AT BEDTIME   Tresiba FlexTouch 100 units/mL injection pen  Self Yes No   Sig: INJECT 34 UNITS DAILY IN THE EVENT OF PUMP FAILURE E10.65   aspirin (ECOTRIN LOW STRENGTH) 81 mg EC tablet  Self Yes No   Sig: Take 162 mg by mouth 2 (two) times a day as needed for headaches   clindamycin (CLEOCIN T) 1 % lotion  Self Yes No   Sig: Apply 1 application. topically 2 (two) times a day   doxycycline hyclate (VIBRAMYCIN) 100 mg capsule   Yes No   Sig: Take 100 mg by mouth daily   folic acid (FOLVITE) 1 mg tablet  Self No No   Sig: Take 1 tablet (1 mg total) by mouth daily   gabapentin (Neurontin) 300 mg capsule  Self No No   Sig: Take 1 capsule (300 mg total) by mouth daily at bedtime PRN   glucagon 1 MG injection  Self Yes No   Si mg   hydroxychloroquine (PLAQUENIL) 200 mg tablet  Self No No   Sig: Take 1 tablet (200 mg total) by mouth 2 (two) times a day with meals   ketoconazole (NIZORAL) 2 % shampoo  Self Yes No   Sig: Apply topically once   levonorgestrel-ethinyl estradiol (Altavera) 0.15-30 MG-MCG per tablet  Self No No   Sig: Take 1 tablet by mouth daily   levothyroxine 25 mcg tablet  Self No No   Sig: Take 1 tablet (25 mcg total) by mouth daily   methotrexate 50 MG/2ML injection   No No   Sig: Inject 0.8 mL (20 mg total) under the skin once a week   miconazole 2 % cream  Self Yes No   Sig: Apply 1 Application topically 2 (two) times a day To affected area   pancrelipase, Lip-Prot-Amyl, (CREON) 12,000 units capsule  Self No No   Sig: Take 36,000 units of lipase by mouth 3 (three)  times a day with meals Take 3 capsules prior to each meal and 1 prior to snack   tretinoin (REFISSA) 0.05 % cream   Yes No   Sig: APPLY TO AFFECTED AREAS NIGHTLY IF IRRITATION OCCURS THEN APPLY MOISTURIZER FIRST   tretinoin (RETIN-A) 0.025 % cream  Self Yes No   Sig: Apply topically daily at bedtime      Facility-Administered Medications: None       Past Medical History:   Diagnosis Date    Abdominal pain     Anaphylaxis     Anxiety     Anxiety and depression     COVID-19 12/2020    Delayed emergence from anesthesia 03/23/2023    Severe episode of emergence delirium (confused, combative) after MAC anesthetic on 03/2023 for EGD. Received versed 2mg, >50mcg precedex, 5mg IV haldol, and 50mcg fentanyl. Waxing and waning episodes of agitation. Any future anesthetics should NOT be done at [facility].    Delirium, induced by drug     anesthesia    Depression     Diabetes mellitus (HCC)     Disease of thyroid gland     Hashimoto    DKA, type 1 (HCC) 05/11/2021    Eating disorder     history of anorexia/bulemia 2173-1819    Food intolerance     Fracture of fibula     R Salter I    Gilbert disease     Hashimoto's disease 02/21/2020    Head injury     Headache(784.0)     Nasal congestion     PTSD (post-traumatic stress disorder)     Rectal bleed     Seizures (HCC)     Type 1 diabetes (HCC)        Past Surgical History:   Procedure Laterality Date    COLONOSCOPY      KNEE SURGERY Right 07/06/2020    NASAL SEPTOPLASTY W/ TURBINOPLASTY      SINUS SURGERY      SKIN BIOPSY      TURBINOPLASTY N/A 3/22/2021    Procedure: TURBINOPLASTY;  Surgeon: Jn Hidalgo MD;  Location: [facility];  Service: ENT    UPPER GASTROINTESTINAL ENDOSCOPY      WISDOM TOOTH EXTRACTION      WRIST SURGERY      left; Excision of ganglion       Family History   Problem Relation Age of Onset    Hypertension Mother     Migraines Mother         Headache    Diabetes type II Mother     Varicose Veins Mother     Hyperlipidemia Mother     Diabetes Mother      Arthritis Mother     Depression Mother     Hearing loss Mother     Anxiety disorder Mother     Eczema Father     Cholelithiasis Father     Hypertension Father     Sarcoidosis Father         Liver    Hyperlipidemia Father     Diabetes Father     Coronary artery disease Father     Nephrolithiasis Father     Cirrhosis Father     Alcohol abuse Father     Thyroid disease Sister     Hashimoto's thyroiditis Sister     Alcohol abuse Brother     Cancer Family     Diabetes Family     Hypertension Family      I have reviewed and agree with the history as documented.    E-Cigarette/Vaping    E-Cigarette Use Never User      E-Cigarette/Vaping Substances    Nicotine No     THC No     CBD No     Flavoring No     Other No     Unknown No      Social History     Tobacco Use    Smoking status: Never     Passive exposure: Never    Smokeless tobacco: Never    Tobacco comments:     Tobacco smoke exposure (Father smokes cigars)   Vaping Use    Vaping status: Never Used   Substance Use Topics    Alcohol use: Yes     Comment: rarely    Drug use: Never        Review of Systems   Constitutional:  Positive for activity change and appetite change. Negative for chills and fever.   Respiratory:  Positive for shortness of breath.    Cardiovascular:  Positive for chest pain.   Gastrointestinal:  Positive for abdominal pain, nausea and vomiting.   Genitourinary:  Positive for frequency and urgency.   Musculoskeletal: Negative.    Skin: Negative.    Neurological: Negative.    Psychiatric/Behavioral: Negative.         Physical Exam  ED Triage Vitals   Temperature Pulse Respirations Blood Pressure SpO2   01/14/24 2300 01/14/24 2219 01/14/24 2219 01/14/24 2219 01/14/24 2219   97.9 °F (36.6 °C) (!) 117 (!) 30 136/95 98 %      Temp Source Heart Rate Source Patient Position - Orthostatic VS BP Location FiO2 (%)   01/14/24 2300 01/14/24 2219 01/14/24 2219 01/14/24 2219 --   Oral Monitor Lying Right arm       Pain Score       01/14/24 2239       8              Orthostatic Vital Signs  Vitals:    01/14/24 2219 01/14/24 2300   BP: 136/95    Pulse: (!) 117 94   Patient Position - Orthostatic VS: Lying        Physical Exam  Constitutional:       General: She is in acute distress.      Appearance: She is well-developed.   HENT:      Head: Normocephalic and atraumatic.      Right Ear: External ear normal.      Left Ear: External ear normal.      Nose: Nose normal. No rhinorrhea.      Mouth/Throat:      Mouth: Mucous membranes are dry.      Pharynx: Oropharynx is clear.   Eyes:      Extraocular Movements: Extraocular movements intact.      Conjunctiva/sclera: Conjunctivae normal.   Cardiovascular:      Rate and Rhythm: Regular rhythm. Tachycardia present.      Pulses: Normal pulses.      Heart sounds: Normal heart sounds.   Pulmonary:      Effort: Pulmonary effort is normal.      Breath sounds: Normal breath sounds.   Abdominal:      General: Abdomen is flat.      Tenderness: There is abdominal tenderness (diffuse).   Skin:     General: Skin is warm and dry.      Capillary Refill: Capillary refill takes 2 to 3 seconds.      Comments: Poor skin turgor   Neurological:      General: No focal deficit present.      Mental Status: She is alert and oriented to person, place, and time.   Psychiatric:         Mood and Affect: Mood normal.         Behavior: Behavior normal.         ED Medications  Medications   magnesium sulfate 2 g/50 mL IVPB (premix) 2 g (2 g Intravenous New Bag 1/14/24 3061)   potassium chloride 20 mEq IVPB (premix) (20 mEq Intravenous New Bag 1/15/24 0033)   lactated ringers bolus 1,000 mL (1,000 mL Intravenous New Bag 1/15/24 0021)   chlorhexidine (PERIDEX) 0.12 % oral rinse 15 mL (has no administration in time range)   enoxaparin (LOVENOX) subcutaneous injection 40 mg (has no administration in time range)   multi-electrolyte (ISOLYTE-S PH 7.4 equivalent) IV solution (has no administration in time range)     Followed by   multi-electrolyte (ISOLYTE-S PH 7.4  equivalent) IV solution (has no administration in time range)   dextrose 5 % in lactated Ringer's infusion (has no administration in time range)   insulin regular (HumuLIN R,NovoLIN R) 1 Units/mL in sodium chloride 0.9 % 100 mL infusion (5.9 Units/hr Intravenous New Bag 1/15/24 0039)   lactated ringers bolus 2,000 mL (2,000 mL Intravenous New Bag 1/14/24 2240)   ondansetron (ZOFRAN) injection 4 mg (4 mg Intravenous Given 1/14/24 2239)   morphine injection 4 mg (4 mg Intravenous Given 1/14/24 2239)   insulin regular (HumuLIN R,NovoLIN R) injection 6 Units (6 Units Intravenous Given 1/15/24 0000)   iohexol (OMNIPAQUE) 350 MG/ML injection (MULTI-DOSE) 80 mL (80 mL Intravenous Given 1/14/24 2331)   morphine injection 2 mg (2 mg Intravenous Given 1/14/24 2355)       Diagnostic Studies  Results Reviewed       Procedure Component Value Units Date/Time    Hemoglobin A1C [[id number]] Collected: 01/14/24 2238    Lab Status: In process Specimen: Blood from Arm, Left Updated: 01/15/24 0039    Basic metabolic panel [[id number]]     Lab Status: No result Specimen: Blood     Magnesium [[id number]]     Lab Status: No result Specimen: Blood     Phosphorus [[id number]]     Lab Status: No result Specimen: Blood     Basic metabolic panel [[id number]]     Lab Status: No result Specimen: Blood     Magnesium [[id number]]     Lab Status: No result Specimen: Blood     Phosphorus [[id number]]     Lab Status: No result Specimen: Blood     Fingerstick Glucose (POCT) [[id number]]  (Abnormal) Collected: 01/14/24 2359    Lab Status: Final result Updated: 01/15/24 0008     POC Glucose 423 mg/dl     TSH, 3rd generation with Free T4 reflex [[id number]]  (Normal) Collected: 01/14/24 2238    Lab Status: Final result Specimen: Blood from Arm, Left Updated: 01/14/24 2350     TSH 3RD GENERATON 2.490 uIU/mL     FLU/RSV/COVID - if FLU/RSV clinically relevant [[id number]]  (Normal) Collected: 01/14/24 2240    Lab Status: Final result Specimen: Nares from Nose  Updated: 01/14/24 2332     SARS-CoV-2 Negative     INFLUENZA A PCR Negative     INFLUENZA B PCR Negative     RSV PCR Negative    Narrative:      FOR PEDIATRIC PATIENTS - copy/paste COVID Guidelines URL to browser: [email or web address]    SARS-CoV-2 assay is a Nucleic Acid Amplification assay intended for the  qualitative detection of nucleic acid from SARS-CoV-2 in nasopharyngeal  swabs. Results are for the presumptive identification of SARS-CoV-2 RNA.    Positive results are indicative of infection with SARS-CoV-2, the virus  causing COVID-19, but do not rule out bacterial infection or co-infection  with other viruses. Laboratories within the [place] and its  territories are required to report all positive results to the appropriate  public health authorities. Negative results do not preclude SARS-CoV-2  infection and should not be used as the sole basis for treatment or other  patient management decisions. Negative results must be combined with  clinical observations, patient history, and epidemiological information.  This test has not been FDA cleared or approved.    This test has been authorized by FDA under an Emergency Use Authorization  (EUA). This test is only authorized for the duration of time the  declaration that circumstances exist justifying the authorization of the  emergency use of an in vitro diagnostic tests for detection of SARS-CoV-2  virus and/or diagnosis of COVID-19 infection under section 564(b)(1) of  the Act, 21 U.S.C. 360bbb-3(b)(1), unless the authorization is terminated  or revoked sooner. The test has been validated but independent review by FDA  and CLIA is pending.    Test performed using [employer]: This RT-PCR assay targets N2,  a region unique to SARS-CoV-2. A conserved region in the E-gene was chosen  for pan-Sarbecovirus detection which includes SARS-CoV-2.    According to CMS-2020-01-R, this platform meets the  definition of high-throughput technology.    Urine culture [[id number]] Collected: 01/14/24 2255    Lab Status: In process Specimen: Urine, Clean Catch Updated: 01/14/24 2329    Urine Microscopic [[id number]]  (Normal) Collected: 01/14/24 2255    Lab Status: Final result Specimen: Urine, Clean Catch Updated: 01/14/24 2326     RBC, UA 1-2 /hpf      WBC, UA 1-2 /hpf      Epithelial Cells None Seen /hpf      Bacteria, UA Occasional /hpf     UA w Reflex to Microscopic w Reflex to Culture [[id number]]  (Abnormal) Collected: 01/14/24 2255    Lab Status: Final result Specimen: Urine, Clean Catch Updated: 01/14/24 2325     Color, UA Colorless     Clarity, UA Clear     Specific Gravity, UA 1.027     pH, UA 5.0     Leukocytes, UA Elevated glucose may cause decreased leukocyte values. See urine microscopic for UWBC result     Nitrite, UA Negative     Protein, UA Negative mg/dl      Glucose, UA >=1000 (1%) mg/dl      Ketones,  (4+) mg/dl      Urobilinogen, UA <2.0 mg/dl      Bilirubin, UA Negative     Occult Blood, UA Negative    HS Troponin 0hr (reflex protocol) [[id number]]  (Normal) Collected: 01/14/24 2239    Lab Status: Final result Specimen: Blood from Arm, Left Updated: 01/14/24 2319     hs TnI 0hr 2 ng/L     Comprehensive metabolic panel [[id number]]  (Abnormal) Collected: 01/14/24 2238    Lab Status: Final result Specimen: Blood from Arm, Left Updated: 01/14/24 2318     Sodium 129 mmol/L      Potassium 4.1 mmol/L      Chloride 91 mmol/L      CO2 13 mmol/L      ANION GAP 25 mmol/L      BUN 15 mg/dL      Creatinine 0.96 mg/dL      Glucose 747 mg/dL      Calcium 9.6 mg/dL      AST 19 U/L      ALT 18 U/L      Alkaline Phosphatase 75 U/L      Total Protein 7.9 g/dL      Albumin 4.7 g/dL      Total Bilirubin 1.11 mg/dL      eGFR 81 ml/min/1.73sq m     Narrative:      National Kidney Disease Foundation guidelines for Chronic Kidney Disease (CKD):     Stage 1 with normal or high GFR (GFR > 90 mL/min/1.73 square meters)     Stage 2 Mild CKD (GFR = 60-89 mL/min/1.73 square meters)    Stage 3A Moderate CKD (GFR = 45-59 mL/min/1.73 square meters)    Stage 3B Moderate CKD (GFR = 30-44 mL/min/1.73 square meters)    Stage 4 Severe CKD (GFR = 15-29 mL/min/1.73 square meters)    Stage 5 End Stage CKD (GFR <15 mL/min/1.73 square meters)  Note: GFR calculation is accurate only with a steady state creatinine    Lactic acid, plasma (w/reflex if result > 2.0) [[id number]]  (Abnormal) Collected: 01/14/24 2235    Lab Status: Final result Specimen: Blood from Arm, Left Updated: 01/14/24 2317     LACTIC ACID 2.8 mmol/L     Narrative:      Result may be elevated if tourniquet was used during collection.    Lactic acid 2 Hours [[id number]]     Lab Status: No result Specimen: Blood     Blood culture #1 [[id number]] Collected: 01/14/24 2255    Lab Status: In process Specimen: Blood from Hand, Left Updated: 01/14/24 2312    Magnesium [[id number]]  (Abnormal) Collected: 01/14/24 2238    Lab Status: Final result Specimen: Blood from Arm, Left Updated: 01/14/24 2311     Magnesium 1.8 mg/dL     Lipase [[id number]]  (Normal) Collected: 01/14/24 2238    Lab Status: Final result Specimen: Blood from Arm, Left Updated: 01/14/24 2311     Lipase 31 u/L     POCT pregnancy, urine [[id number]]  (Normal) Resulted: 01/14/24 2300    Lab Status: Final result Updated: 01/14/24 2309     EXT Preg Test, Ur Negative     Control Valid    D-Dimer [[id number]]  (Normal) Collected: 01/14/24 2243    Lab Status: Final result Specimen: Blood from Arm, Left Updated: 01/14/24 2309     D-Dimer, Quant 0.46 ug/ml FEU     Protime-INR [[id number]]  (Normal) Collected: 01/14/24 2243    Lab Status: Final result Specimen: Blood from Arm, Left Updated: 01/14/24 2306     Protime 13.3 seconds      INR 0.95    APTT [[id number]]  (Normal) Collected: 01/14/24 2248    Lab Status: Final result Specimen: Blood from Arm, Left Updated: 01/14/24 2306     PTT 23 seconds     Beta Hydroxybutyrate [[id number]]   (Abnormal) Collected: 01/14/24 2238    Lab Status: Final result Specimen: Blood from Arm, Left Updated: 01/14/24 2256     BETA-HYDROXYBUTYRATE 5.3 mmol/L     CBC and differential [[id number]]  (Abnormal) Collected: 01/14/24 2238    Lab Status: Final result Specimen: Blood from Arm, Left Updated: 01/14/24 2253     WBC 8.36 Thousand/uL      RBC 4.88 Million/uL      Hemoglobin 14.1 g/dL      Hematocrit 43.0 %      MCV 88 fL      MCH 28.9 pg      MCHC 32.8 g/dL      RDW 13.3 %      MPV 13.2 fL      Platelets 217 Thousands/uL      nRBC 0 /100 WBCs      Neutrophils Relative 60 %      Immat GRANS % 0 %      Lymphocytes Relative 32 %      Monocytes Relative 6 %      Eosinophils Relative 1 %      Basophils Relative 1 %      Neutrophils Absolute 5.00 Thousands/µL      Immature Grans Absolute 0.02 Thousand/uL      Lymphocytes Absolute 2.70 Thousands/µL      Monocytes Absolute 0.53 Thousand/µL      Eosinophils Absolute 0.07 Thousand/µL      Basophils Absolute 0.04 Thousands/µL     Blood gas, venous [[id number]]  (Abnormal) Collected: 01/14/24 2238    Lab Status: Final result Specimen: Blood from Arm, Left Updated: 01/14/24 2253     pH, Dariel 7.294     pCO2, Dariel 28.6 mm Hg      pO2, Dariel 46.7 mm Hg      HCO3, Dariel 13.6 mmol/L      Base Excess, Dariel -11.3 mmol/L      O2 Content, Dariel 16.5 ml/dL      O2 HGB, VENOUS 78.2 %     Blood culture #2 [[id number]] Collected: 01/14/24 2235    Lab Status: In process Specimen: Blood from Hand, Left Updated: 01/14/24 2247    Fingerstick Glucose (POCT) [[id number]]  (Abnormal) Collected: 01/14/24 2218    Lab Status: Final result Updated: 01/14/24 2228     POC Glucose >500 mg/dl                    CT chest abdomen pelvis w contrast   Final Result by Hill Mccord MD (01/15 0012)      No acute inflammatory process identified within the chest, abdomen, or pelvis.         Workstation performed: [id number]         XR chest 1 view portable   ED Interpretation by Kiko Olvera MD (01/14 2259)   No evidence  of  acute cardiopulmonary pathology            Procedures  ECG 12 Lead Documentation Only    Date/Time: 1/14/2024 10:53 PM    Performed by: Kiko Olvera MD  Authorized by: Kiko Olvera MD    ECG reviewed by me, the ED Provider: yes    Patient location:  ED  Interpretation:     Interpretation: abnormal    Rate:     ECG rate assessment: tachycardic    Rhythm:     Rhythm: sinus rhythm    Ectopy:     Ectopy: none    QRS:     QRS axis:  Normal    QRS intervals:  Normal  Conduction:     Conduction: normal    ST segments:     ST segments:  Normal  T waves:     T waves: normal          ED Course  ED Course as of 01/15/24 0052   Sun Jan 14, 2024   2254 pH, Dariel(!): 7.294   2254 POC Glucose(!!): >500   2256 BETA-HYDROXYBUTYRATE(!): 5.3   2309 D-Dimer, Quant: 0.46   2312 Magnesium(!): 1.8   2333 CO2(!): 13   Mon Eleno 15, 2024   0003 UA without evidence of UTI.   0008 POC Glucose(!): 423   0013 CT chest abdomen pelvis w contrast  IMPRESSION:     No acute inflammatory process identified within the chest, abdomen, or pelvis.               HEART Risk Score      Flowsheet Row Most Recent Value   Heart Score Risk Calculator    History 0 Filed at: 01/14/2024 2353   ECG 0 Filed at: 01/14/2024 2353   Age 0 Filed at: 01/14/2024 2353   Risk Factors 2 Filed at: 01/14/2024 2353   Troponin 0 Filed at: 01/14/2024 2353   HEART Score 2 Filed at: 01/14/2024 2353                     Initial Sepsis Screening       Row Name 01/14/24 8467                Is the patient's history suggestive of a new or worsening infection? No  Pt meets SIRS criteria. This is likely 2/2 her having DKA. Low suspicion for infectious/septic etiology.  -LEROY                  User Key  (r) = Recorded By, (t) = Taken By, (c) = Cosigned By      Initials Name Provider Type    LEROY Olvera MD Resident                        Abe Criteria for PE      Flowsheet Row Most Recent Value   Danny' Criteria for PE    Clinical signs and symptoms of DVT 0 Filed at: 01/14/2024 2353    PE is primary diagnosis or equally likely 0 Filed at: 01/14/2024 2353   HR >100 1.5 Filed at: 01/14/2024 2353   Immobilization at least 3 days or Surgery in the previous 4 weeks 0 Filed at: 01/14/2024 2353   Previous, objectively diagnosed PE or DVT 0 Filed at: 01/14/2024 2353   Hemoptysis 0 Filed at: 01/14/2024 2353   Malignancy with treatment within 6 months or palliative 0 Filed at: 01/14/2024 2353   Wells' Criteria Total 1.5 Filed at: 01/14/2024 2353              Medical Decision Making  Ddx for her DKA includes but not limited to poorly controlled DM vs. Infectious etiology vs. Autoimmune condition driving her hyperglycemia. Labs and imaging did not point to a specific infectious source. Pt found some relief in her nausea and pain with interventions initiated in the department. Insulin and fluids were given to treat her DKA. CCU consulted who recommend Pt be admitted to their service for further workup and management.    Amount and/or Complexity of Data Reviewed  Labs: ordered. Decision-making details documented in ED Course.  Radiology: ordered and independent interpretation performed. Decision-making details documented in ED Course.    Risk  OTC drugs.  Prescription drug management.  Decision regarding hospitalization.          Disposition  Final diagnoses:   DKA (diabetic ketoacidosis) (HCC)   Hypomagnesemia     Time reflects when diagnosis was documented in both MDM as applicable and the Disposition within this note       Time User Action Codes Description Comment    1/14/2024 11:11 PM Kiko Olvera [E11.10] DKA (diabetic ketoacidosis) (HCC)     1/14/2024 11:12 PM Kiko Olvera [E83.42] Hypomagnesemia           ED Disposition       ED Disposition   Admit    Condition   Stable    Date/Time   Mon Eleno 15, 2024 0040    Comment   Case was discussed with Kali Romero and the patient's admission status was agreed to be Admission Status: inpatient status to the service of Dr. Acosta .                Follow-up Information    None         Patient's Medications   Discharge Prescriptions    No medications on file     No discharge procedures on file.    PDMP Review         Value Time User    PDMP Reviewed  Yes 1/14/2024 10:14 PM Jn Borja MD             ED Provider  Attending physically available and evaluated Jenny Rodrigues. I managed the patient along with the ED Attending.    Electronically Signed by           Kiko Olvera MD  01/15/24 0052

## 2024-01-15 NOTE — PLAN OF CARE
Problem: PAIN - ADULT  Goal: Verbalizes/displays adequate comfort level or baseline comfort level  Description: Interventions:  - Encourage patient to monitor pain and request assistance  - Assess pain using appropriate pain scale  - Administer analgesics based on type and severity of pain and evaluate response  - Implement non-pharmacological measures as appropriate and evaluate response  - Consider cultural and social influences on pain and pain management  - Notify physician/advanced practitioner if interventions unsuccessful or patient reports new pain  Outcome: Progressing     Problem: INFECTION - ADULT  Goal: Absence or prevention of progression during hospitalization  Description: INTERVENTIONS:  - Assess and monitor for signs and symptoms of infection  - Monitor lab/diagnostic results  - Monitor all insertion sites, i.e. indwelling lines, tubes, and drains  - Monitor endotracheal if appropriate and nasal secretions for changes in amount and color  - Hawkeye appropriate cooling/warming therapies per order  - Administer medications as ordered  - Instruct and encourage patient and family to use good hand hygiene technique  - Identify and instruct in appropriate isolation precautions for identified infection/condition  Outcome: Progressing  Goal: Absence of fever/infection during neutropenic period  Description: INTERVENTIONS:  - Monitor WBC    Outcome: Progressing     Problem: SAFETY ADULT  Goal: Patient will remain free of falls  Description: INTERVENTIONS:  - Educate patient/family on patient safety including physical limitations  - Instruct patient to call for assistance with activity   - Consult OT/PT to assist with strengthening/mobility   - Keep Call bell within reach  - Keep bed low and locked with side rails adjusted as appropriate  - Keep care items and personal belongings within reach  - Initiate and maintain comfort rounds  - Make Fall Risk Sign visible to staff  - Offer Toileting every 2 Hours,  in advance of need  - Initiate/Maintain bed alarm  - Obtain necessary fall risk management equipment:   - Apply yellow socks and bracelet for high fall risk patients  - Consider moving patient to room near nurses station  Outcome: Progressing  Goal: Maintain or return to baseline ADL function  Description: INTERVENTIONS:  -  Assess patient's ability to carry out ADLs; assess patient's baseline for ADL function and identify physical deficits which impact ability to perform ADLs (bathing, care of mouth/teeth, toileting, grooming, dressing, etc.)  - Assess/evaluate cause of self-care deficits   - Assess range of motion  - Assess patient's mobility; develop plan if impaired  - Assess patient's need for assistive devices and provide as appropriate  - Encourage maximum independence but intervene and supervise when necessary  - Involve family in performance of ADLs  - Assess for home care needs following discharge   - Consider OT consult to assist with ADL evaluation and planning for discharge  - Provide patient education as appropriate  Outcome: Progressing  Goal: Maintains/Returns to pre admission functional level  Description: INTERVENTIONS:  - Perform AM-PAC 6 Click Basic Mobility/ Daily Activity assessment daily.  - Set and communicate daily mobility goal to care team and patient/family/caregiver.   - Collaborate with rehabilitation services on mobility goals if consulted  - Perform Range of Motion 3 times a day.  - Reposition patient every 2 hours.  - Dangle patient 2 times a day  - Stand patient 2 times a day  - Ambulate patient 2 times a day  - Out of bed to chair 2 times a day   - Out of bed for meals 2 times a day  - Out of bed for toileting  - Record patient progress and toleration of activity level   Outcome: Progressing     Problem: DISCHARGE PLANNING  Goal: Discharge to home or other facility with appropriate resources  Description: INTERVENTIONS:  - Identify barriers to discharge w/patient and caregiver  -  Arrange for needed discharge resources and transportation as appropriate  - Identify discharge learning needs (meds, wound care, etc.)  - Arrange for interpretive services to assist at discharge as needed  - Refer to Case Management Department for coordinating discharge planning if the patient needs post-hospital services based on physician/advanced practitioner order or complex needs related to functional status, cognitive ability, or social support system  Outcome: Progressing     Problem: Knowledge Deficit  Goal: Patient/family/caregiver demonstrates understanding of disease process, treatment plan, medications, and discharge instructions  Description: Complete learning assessment and assess knowledge base.  Interventions:  - Provide teaching at level of understanding  - Provide teaching via preferred learning methods  Outcome: Progressing     Problem: METABOLIC, FLUID AND ELECTROLYTES - ADULT  Goal: Electrolytes maintained within normal limits  Description: INTERVENTIONS:  - Monitor labs and assess patient for signs and symptoms of electrolyte imbalances  - Administer electrolyte replacement as ordered  - Monitor response to electrolyte replacements, including repeat lab results as appropriate  - Instruct patient on fluid and nutrition as appropriate  Outcome: Progressing  Goal: Fluid balance maintained  Description: INTERVENTIONS:  - Monitor labs   - Monitor I/O and WT  - Instruct patient on fluid and nutrition as appropriate  - Assess for signs & symptoms of volume excess or deficit  Outcome: Progressing  Goal: Glucose maintained within target range  Description: INTERVENTIONS:  - Monitor Blood Glucose as ordered  - Assess for signs and symptoms of hyperglycemia and hypoglycemia  - Administer ordered medications to maintain glucose within target range  - Assess nutritional intake and initiate nutrition service referral as needed  Outcome: Progressing     Problem: Nutrition/Hydration-ADULT  Goal:  Nutrient/Hydration intake appropriate for improving, restoring or maintaining nutritional needs  Description: Monitor and assess patient's nutrition/hydration status for malnutrition. Collaborate with interdisciplinary team and initiate plan and interventions as ordered.  Monitor patient's weight and dietary intake as ordered or per policy. Utilize nutrition screening tool and intervene as necessary. Determine patient's food preferences and provide high-protein, high-caloric foods as appropriate.     INTERVENTIONS:  - Monitor oral intake, urinary output, labs, and treatment plans  - Assess nutrition and hydration status and recommend course of action  - Evaluate amount of meals eaten  - Assist patient with eating if necessary   - Allow adequate time for meals  - Recommend/ encourage appropriate diets, oral nutritional supplements, and vitamin/mineral supplements  - Order, calculate, and assess calorie counts as needed  - Recommend, monitor, and adjust tube feedings and TPN/PPN based on assessed needs  - Assess need for intravenous fluids  - Provide specific nutrition/hydration education as appropriate  - Include patient/family/caregiver in decisions related to nutrition  Outcome: Progressing

## 2024-01-15 NOTE — ED ATTENDING ATTESTATION
1/14/2024  I, Jn Borja MD, saw and evaluated the patient. I have discussed the patient with the resident/non-physician practitioner and agree with the resident's/non-physician practitioner's findings, Plan of Care, and MDM as documented in the resident's/non-physician practitioner's note, except where noted. All available labs and Radiology studies were reviewed.  I was present for key portions of any procedure(s) performed by the resident/non-physician practitioner and I was immediately available to provide assistance.       At this point I agree with the current assessment done in the Emergency Department.  I have conducted an independent evaluation of this patient a history and physical is as follows: Patient is a 28 year old female with issues with her insulin pump and was changed to injectable insulin and blood sugars have been high. (+) chest pain, sob, abdominal pain, N/V and weakness today. No fever. No cough. No abdominal surgery. No diarrhea. (+) difficulty  emptying urinary bladder and patient is worried about a UTI. Patient worried about DKA. Patient needed our urgent attention. Was last seen at  Rheumatology in  [place] on 12/18/23 for undifferentiated connective tissue disease. [employer] website checked on this patient and last Rx filled was on 10/4/23 for vicodin for 3 day supply. NCAT. Mucous membrane dry. Lungs clear. Tachypnea. Heart regular without murmur. Nontender chest wall. Abdomen soft with diffuse tenderness. Good bowel sounds. No edema. No rash noted. No jaundice. Differential diagnosis including but not limited to: DKA, hyperglycemia, metabolic abnormality, dehydration,  UTI, cardiac etiology, PE, PTX, pneumonia, esophagitis, doubt intracranial process; noncompliance with medications. DDx including but not limited to: appendicitis, gastroenteritis, gastritis, PUD, GERD, gastroparesis, hepatitis, pancreatitis, colitis, enteritis, food poisoning, mesenteric adenitis, epiploic  appendagitis, IBD, IBS, ileus, bowel obstruction, volvulus, cholecystitis, biliary colic, choledocholithiasis, perforated viscus, tumor, splenic etiology, diverticulitis, internal hernia, constipation, pelvic pathology, renal colic, pyelonephritis.  Will check labs, EKG, CT and give IVFs and IV regular insulin. Will need admission for DKA.     ED Course         Critical Care Time  Procedures

## 2024-01-15 NOTE — CONSULTS
"Consultation - Jenny Rodrigues 28 y.o. female MRN: [id number]    Unit/Bed#: ICU 05 Encounter: [id number]      Assessment/Plan   Uncontrolled type 1 diabetes mellitus with diabetic ketoacidosis presentation  Assessment:  This is a 28 y.o.-year-old female with diabetic ketoacidosis in the setting of type 1 diabetes mellitis with recent history of OmniPod insulin pump failure, post prandial hyperglycemia on basal bolus regimen.  Home regimen: NovoLog 3 times daily premeals based on ICR 1: 6.  Tresiba 38 units at bedtime    Plan:  Patient continues to be nauseous and appetite has not returned yet.  Was given clear liquids for breakfast however did not consume.  Has got lunch tray with Zofran, will hold off on transitioning to basal bolus insulin at this time until patient is able to tolerate diet.  She can be continued on non-DKA protocol insulin gtt. today and will attempt to transition tomorrow based on requirements.  Patient has had multiple technical issues with OmniPod including skin reactions which may or may not be due to her connective tissue disease, this can be addressed as outpatient with [facility] endocrinology and diabetic education.  Will advise basal bolus insulin for hospital stay and on discharge.  She will need insulin teaching at bedside and new prescriptions for insulin and supplies as insulin she has at home may likely be .    Monitor for for hypoglycemia and treat according to protocol  Will continue to follow and make adjustments as necessary    Please Tigertext questions to the clinician covering the \"[facility]LD-Niia-Ebbk\" Role. Thank you.  Patient seen at bedside and management plan discussed with attending physician.  Thank you for letting us participate in the care of your patient, please do not hesitate to reach out with questions.      CC: Diabetes Consult    History of Present Illness     HPI: Jenny Rodrigues is a 28 y.o. year old female with undifferentiated connective tissue disease, " Hashimoto's thyroiditis, type 1 diabetes for 5 years.  She is on insulin via OmniPod pump and Dexcom G6 at home.  Patient had trouble with her insulin pump with pump site painful red bumps and bleeding with leakage of insulin 3 weeks ago and could not hear back from [facility] endocrinology, so she switched to basal bolus regimen with 1 ratio 6 carb correction with meals a few weeks ago along with long-acting insulin.  She states that it was not  however may be old.  She was having hyperglycemic values for a week and noticed BG high in 300s yesterday morning, gave herself a total of 75 units short acting insulin with meals during the day, and by nighttime she developed nausea vomiting abdominal pain, chest pain polyuria.  She noted glucometer reading HIGH so came to ER last night. On presentation, BG elevated at 747 and elevated anion gap-diabetic ketoacidosis and managed on DKA protocol; continues on insulin gtt.  She has been started on diet this morning and did not take clear liquids for breakfast, has not eaten lunch yet, continues to feel nauseous with decreased appetite.  Patient was initially following with [facility] endocrinology, established care after being diagnosed with type 1 diabetes mellitus in Dec 2018 hospital admission for hyperglycemia.  She was initially maintained on basal bolus insulin and was started on tandem insulin pump.  Patient has had 2 episodes of DKA in previous years. She did not follow-up with [facility] endocrinology after ; shifted care to [facility] endocrinology; as per documentation in her last visit with them in 2023 she was taking dexamethasone steroids for flareup of autoimmune disease.  Was switched to OmniPod in May 2023. Patient also has a history of hypotension, was being worked up for adrenal insufficiency with ACTH stim test, however dexamethasone had not improved her symptoms of lupus, she went off of steroids in 2023.  Last episode  of DKA was in September 2022.  Currently on insulin gtt.      Inpatient consult to Endocrinology     Date/Time  1/15/2024 2:00 PM     Performed by  Emliy Perales MD   Authorized by  MARGARET Merino             Review of Systems   Constitutional:  Positive for appetite change. Negative for activity change and fatigue.   HENT:  Negative for trouble swallowing.    Eyes:  Negative for photophobia.   Cardiovascular:  Negative for chest pain and palpitations.   Gastrointestinal:  Positive for nausea. Negative for constipation and diarrhea.   Skin:         Painful red bumps at site of OmniPod 3 weeks ago, none at this time   Psychiatric/Behavioral:  Negative for confusion.        Historical Information   Past Medical History:   Diagnosis Date    Abdominal pain     Anaphylaxis     Anxiety     Anxiety and depression     COVID-19 12/2020    Delayed emergence from anesthesia 03/23/2023    Severe episode of emergence delirium (confused, combative) after MAC anesthetic on 03/2023 for EGD. Received versed 2mg, >50mcg precedex, 5mg IV haldol, and 50mcg fentanyl. Waxing and waning episodes of agitation. Any future anesthetics should NOT be done at [facility].    Delirium, induced by drug     anesthesia    Depression     Diabetes mellitus (HCC)     Disease of thyroid gland     Hashimoto    DKA, type 1 (HCC) 05/11/2021    Eating disorder     history of anorexia/bulemia [id number]    Food intolerance     Fracture of fibula     R Salter I    Gilbert disease     Hashimoto's disease 02/21/2020    Head injury     Headache(784.0)     Nasal congestion     PTSD (post-traumatic stress disorder)     Rectal bleed     Seizures (HCC)     Type 1 diabetes (HCC)      Past Surgical History:   Procedure Laterality Date    COLONOSCOPY      KNEE SURGERY Right 07/06/2020    NASAL SEPTOPLASTY W/ TURBINOPLASTY      SINUS SURGERY      SKIN BIOPSY      TURBINOPLASTY N/A 3/22/2021    Procedure: TURBINOPLASTY;  Surgeon: Jn Hidalgo MD;  Location: [facility]  OR;  Service: ENT    UPPER GASTROINTESTINAL ENDOSCOPY      WISDOM TOOTH EXTRACTION      WRIST SURGERY      left; Excision of ganglion     Social History   Social History     Substance and Sexual Activity   Alcohol Use Yes    Comment: rarely     Social History     Substance and Sexual Activity   Drug Use Never     Social History     Tobacco Use   Smoking Status Never    Passive exposure: Never   Smokeless Tobacco Never   Tobacco Comments    Tobacco smoke exposure (Father smokes cigars)     Family History:   Family History   Problem Relation Age of Onset    Hypertension Mother     Migraines Mother         Headache    Diabetes type II Mother     Varicose Veins Mother     Hyperlipidemia Mother     Diabetes Mother     Arthritis Mother     Depression Mother     Hearing loss Mother     Anxiety disorder Mother     Eczema Father     Cholelithiasis Father     Hypertension Father     Sarcoidosis Father         Liver    Hyperlipidemia Father     Diabetes Father     Coronary artery disease Father     Nephrolithiasis Father     Cirrhosis Father     Alcohol abuse Father     Thyroid disease Sister     Hashimoto's thyroiditis Sister     Alcohol abuse Brother     Cancer Family     Diabetes Family     Hypertension Family        Meds/Allergies   Current Facility-Administered Medications   Medication Dose Route Frequency Provider Last Rate Last Admin    acetaminophen (TYLENOL) tablet 975 mg  975 mg Oral Q6H PRN Cori Carmela Rebolledo MD   975 mg at 01/15/24 0816    chlorhexidine (PERIDEX) 0.12 % oral rinse 15 mL  15 mL Mouth/Throat Q12H ROQUE Cori Rebolledo MD   15 mL at 01/15/24 0816    dextrose 5 % in lactated Ringer's infusion  250 mL/hr Intravenous Continuous Cori Carmela Rebolledo  mL/hr at 01/15/24 0940 250 mL/hr at 01/15/24 0940    enoxaparin (LOVENOX) subcutaneous injection 40 mg  40 mg Subcutaneous Daily Cori Rebolledo MD   40 mg at 01/15/24 0816    famotidine (PEPCID) tablet 20 mg  20 mg Oral Daily Brisa Sharif  "CRNP   20 mg at 01/15/24 0815    gabapentin (NEURONTIN) capsule 600 mg  600 mg Oral Daily Cori Rebolledo MD   600 mg at 01/15/24 0309    hydroxychloroquine (PLAQUENIL) tablet 200 mg  200 mg Oral BID Cori Rebolledo MD   200 mg at 01/15/24 0816    insulin regular (HumuLIN R,NovoLIN R) 1 Units/mL in sodium chloride 0.9 % 100 mL infusion  0.1-30 Units/hr Intravenous Continuous Cori Rebolledo MD 2.9 mL/hr at 01/15/24 0106 2.9 Units/hr at 01/15/24 0106    levothyroxine tablet 25 mcg  25 mcg Oral Early Morning Cori Rebolledo MD   25 mcg at 01/15/24 0539    LORazepam (ATIVAN) tablet 0.5 mg  0.5 mg Oral BID PRN Cori Rebolledo MD        ondansetron (ZOFRAN) injection 4 mg  4 mg Intravenous Q6H PRN Cori Rebolledo MD   4 mg at 01/15/24 0549     Allergies   Allergen Reactions    Insulin Glargine Other (See Comments)     LANTUS BRAND -Burning and redness under skin        Objective   Vitals: Blood pressure (!) 88/65, pulse 72, temperature 98 °F (36.7 °C), temperature source Oral, resp. rate 16, height 5' 1\" (1.549 m), weight 67.3 kg (148 lb 5.9 oz), SpO2 100%, not currently breastfeeding.    Intake/Output Summary (Last 24 hours) at 1/15/2024 1141  Last data filed at 1/15/2024 0818  Gross per 24 hour   Intake 6593.19 ml   Output 411 ml   Net 6182.19 ml     Invasive Devices       Peripheral Intravenous Line  Duration             Peripheral IV 01/14/24 Dorsal (posterior);Left Hand <1 day    Peripheral IV 01/14/24 Left Antecubital <1 day    Peripheral IV 01/15/24 Dorsal (posterior);Right Hand <1 day    Peripheral IV 01/15/24 Proximal;Right;Ventral (anterior) Forearm <1 day                    Physical Exam  Constitutional:       General: She is not in acute distress.     Appearance: She is not ill-appearing.   HENT:      Head: Normocephalic and atraumatic.   Eyes:      Conjunctiva/sclera: Conjunctivae normal.   Cardiovascular:      Rate and Rhythm: Normal rate.   Pulmonary:      Effort: Pulmonary effort " "is normal.   Musculoskeletal:         General: Normal range of motion.   Skin:     Coloration: Skin is not jaundiced or pale.   Neurological:      Mental Status: She is alert and oriented to person, place, and time. Mental status is at baseline.      Comments: No tremors   Psychiatric:         Mood and Affect: Mood normal.         Lab Results:   Lab Results   Component Value Date    HGBA1C 8.7 (H) 10/20/2023           Lab Results   Component Value Date    WBC 6.75 01/15/2024    HGB 11.2 (L) 01/15/2024    HCT 33.1 (L) 01/15/2024    MCV 86 01/15/2024     01/15/2024     Lab Results   Component Value Date/Time    BUN 6 01/15/2024 08:06 AM    BUN 16 12/08/2020 12:05 PM    K 3.6 01/15/2024 08:06 AM    K 4.2 12/08/2020 12:05 PM     01/15/2024 08:06 AM     12/08/2020 12:05 PM    CO2 23 01/15/2024 08:06 AM    CO2 27 12/08/2020 12:05 PM    CREATININE 0.64 01/15/2024 08:06 AM    AST 13 01/15/2024 08:06 AM    AST 17 02/11/2021 02:55 PM    ALT 12 01/15/2024 08:06 AM    ALT 13 02/11/2021 02:55 PM    TP 5.3 (L) 01/15/2024 08:06 AM    TP 7.1 02/11/2021 02:55 PM    ALB 3.2 (L) 01/15/2024 08:06 AM    GLOB 2.9 02/11/2021 02:55 PM     No results for input(s): \"CHOL\", \"HDL\", \"LDL\", \"TRIG\", \"VLDL\" in the last 72 hours.  No results found for: \"MICROALBUR\", \"BSQH19MLU\"  POC Glucose (mg/dl)   Date Value   01/15/2024 213 (H)   01/15/2024 170 (H)   01/15/2024 207 (H)   01/15/2024 185 (H)   01/15/2024 181 (H)   01/15/2024 185 (H)   01/15/2024 183 (H)   01/15/2024 211 (H)   01/15/2024 201 (H)   01/15/2024 251 (H)       Portions of the record may have been created with voice recognition software.    "

## 2024-01-15 NOTE — ASSESSMENT & PLAN NOTE
History of lupus. Pt following with endocrinologist at [facility]. On weekly methotrexate injections and hydroxychlorquine 200mg BID    Plan:  Continue home hydroxychorquine

## 2024-01-15 NOTE — SEPSIS NOTE
Sepsis Note   Jenny Rodrigues 28 y.o. female MRN: [id number]  Unit/Bed#: ED-04 Encounter: [id number]       Initial Sepsis Screening       Row Name 01/14/24 3687                Is the patient's history suggestive of a new or worsening infection? No  Pt meets SIRS criteria. This is likely 2/2 her having DKA. Low suspicion for infectious/septic etiology.  -LEROY                  User Key  (r) = Recorded By, (t) = Taken By, (c) = Cosigned By      Initials Name Provider Type    LEROY Olvera MD Resident                        Body mass index is 23.79 kg/m².  Wt Readings from Last 1 Encounters:   01/14/24 59 kg (130 lb 1.1 oz)     IBW (Ideal Body Weight): 50.1 kg    Ideal body weight: 50.1 kg (110 lb 7.2 oz)  Adjusted ideal body weight: 53.7 kg (118 lb 4.8 oz)

## 2024-01-16 VITALS
TEMPERATURE: 98.7 F | OXYGEN SATURATION: 99 % | DIASTOLIC BLOOD PRESSURE: 65 MMHG | SYSTOLIC BLOOD PRESSURE: 97 MMHG | WEIGHT: 135.8 LBS | HEIGHT: 61 IN | RESPIRATION RATE: 18 BRPM | BODY MASS INDEX: 25.64 KG/M2 | HEART RATE: 84 BPM

## 2024-01-16 LAB
ALBUMIN SERPL BCP-MCNC: 3.4 G/DL (ref 3.5–5)
ALP SERPL-CCNC: 42 U/L (ref 34–104)
ALT SERPL W P-5'-P-CCNC: 12 U/L (ref 7–52)
ANION GAP SERPL CALCULATED.3IONS-SCNC: 11 MMOL/L
AST SERPL W P-5'-P-CCNC: 16 U/L (ref 13–39)
ATRIAL RATE: 108 BPM
ATRIAL RATE: 77 BPM
BASOPHILS # BLD AUTO: 0.03 THOUSANDS/ÂΜL (ref 0–0.1)
BASOPHILS NFR BLD AUTO: 1 % (ref 0–1)
BILIRUB SERPL-MCNC: 0.77 MG/DL (ref 0.2–1)
BUN SERPL-MCNC: 6 MG/DL (ref 5–25)
CALCIUM ALBUM COR SERPL-MCNC: 9 MG/DL (ref 8.3–10.1)
CALCIUM SERPL-MCNC: 8.5 MG/DL (ref 8.4–10.2)
CHLORIDE SERPL-SCNC: 105 MMOL/L (ref 96–108)
CO2 SERPL-SCNC: 22 MMOL/L (ref 21–32)
CREAT SERPL-MCNC: 0.75 MG/DL (ref 0.6–1.3)
EOSINOPHIL # BLD AUTO: 0.06 THOUSAND/ÂΜL (ref 0–0.61)
EOSINOPHIL NFR BLD AUTO: 1 % (ref 0–6)
ERYTHROCYTE [DISTWIDTH] IN BLOOD BY AUTOMATED COUNT: 13.3 % (ref 11.6–15.1)
GFR SERPL CREATININE-BSD FRML MDRD: 110 ML/MIN/1.73SQ M
GLUCOSE SERPL-MCNC: 115 MG/DL (ref 65–140)
GLUCOSE SERPL-MCNC: 171 MG/DL (ref 65–140)
GLUCOSE SERPL-MCNC: 181 MG/DL (ref 65–140)
GLUCOSE SERPL-MCNC: 227 MG/DL (ref 65–140)
GLUCOSE SERPL-MCNC: 296 MG/DL (ref 65–140)
GLUCOSE SERPL-MCNC: 307 MG/DL (ref 65–140)
GLUCOSE SERPL-MCNC: 323 MG/DL (ref 65–140)
GLUCOSE SERPL-MCNC: 93 MG/DL (ref 65–140)
HCT VFR BLD AUTO: 35.9 % (ref 34.8–46.1)
HGB BLD-MCNC: 11.9 G/DL (ref 11.5–15.4)
IMM GRANULOCYTES # BLD AUTO: 0.02 THOUSAND/UL (ref 0–0.2)
IMM GRANULOCYTES NFR BLD AUTO: 0 % (ref 0–2)
LYMPHOCYTES # BLD AUTO: 2.52 THOUSANDS/ÂΜL (ref 0.6–4.47)
LYMPHOCYTES NFR BLD AUTO: 45 % (ref 14–44)
MAGNESIUM SERPL-MCNC: 1.9 MG/DL (ref 1.9–2.7)
MCH RBC QN AUTO: 29.1 PG (ref 26.8–34.3)
MCHC RBC AUTO-ENTMCNC: 33.1 G/DL (ref 31.4–37.4)
MCV RBC AUTO: 88 FL (ref 82–98)
MONOCYTES # BLD AUTO: 0.47 THOUSAND/ÂΜL (ref 0.17–1.22)
MONOCYTES NFR BLD AUTO: 8 % (ref 4–12)
NEUTROPHILS # BLD AUTO: 2.52 THOUSANDS/ÂΜL (ref 1.85–7.62)
NEUTS SEG NFR BLD AUTO: 45 % (ref 43–75)
NRBC BLD AUTO-RTO: 0 /100 WBCS
P AXIS: 63 DEGREES
P AXIS: 68 DEGREES
PHOSPHATE SERPL-MCNC: 3.7 MG/DL (ref 2.7–4.5)
PLATELET # BLD AUTO: 180 THOUSANDS/UL (ref 149–390)
PMV BLD AUTO: 12.7 FL (ref 8.9–12.7)
POTASSIUM SERPL-SCNC: 3.5 MMOL/L (ref 3.5–5.3)
PR INTERVAL: 134 MS
PR INTERVAL: 144 MS
PROT SERPL-MCNC: 5.7 G/DL (ref 6.4–8.4)
QRS AXIS: 46 DEGREES
QRS AXIS: 59 DEGREES
QRSD INTERVAL: 82 MS
QRSD INTERVAL: 82 MS
QT INTERVAL: 342 MS
QT INTERVAL: 382 MS
QTC INTERVAL: 432 MS
QTC INTERVAL: 458 MS
RBC # BLD AUTO: 4.09 MILLION/UL (ref 3.81–5.12)
SODIUM SERPL-SCNC: 138 MMOL/L (ref 135–147)
T WAVE AXIS: 45 DEGREES
T WAVE AXIS: 58 DEGREES
VENTRICULAR RATE: 108 BPM
VENTRICULAR RATE: 77 BPM
WBC # BLD AUTO: 5.62 THOUSAND/UL (ref 4.31–10.16)

## 2024-01-16 PROCEDURE — 83735 ASSAY OF MAGNESIUM: CPT | Performed by: NURSE PRACTITIONER

## 2024-01-16 PROCEDURE — 82948 REAGENT STRIP/BLOOD GLUCOSE: CPT

## 2024-01-16 PROCEDURE — 85025 COMPLETE CBC W/AUTO DIFF WBC: CPT | Performed by: NURSE PRACTITIONER

## 2024-01-16 PROCEDURE — 99239 HOSP IP/OBS DSCHRG MGMT >30: CPT | Performed by: INTERNAL MEDICINE

## 2024-01-16 PROCEDURE — 84100 ASSAY OF PHOSPHORUS: CPT | Performed by: NURSE PRACTITIONER

## 2024-01-16 PROCEDURE — 80053 COMPREHEN METABOLIC PANEL: CPT | Performed by: NURSE PRACTITIONER

## 2024-01-16 PROCEDURE — 99232 SBSQ HOSP IP/OBS MODERATE 35: CPT | Performed by: INTERNAL MEDICINE

## 2024-01-16 RX ORDER — INSULIN ASPART 100 [IU]/ML
15 INJECTION, SOLUTION INTRAVENOUS; SUBCUTANEOUS
Qty: 13.5 ML | Refills: 0 | OUTPATIENT
Start: 2024-01-16 | End: 2024-02-15

## 2024-01-16 RX ORDER — INSULIN DEGLUDEC INJECTION 100 U/ML
30 INJECTION, SOLUTION SUBCUTANEOUS DAILY
Qty: 15 ML | Refills: 0 | Status: SHIPPED | OUTPATIENT
Start: 2024-01-16

## 2024-01-16 RX ORDER — INSULIN LISPRO 100 [IU]/ML
15 INJECTION, SOLUTION INTRAVENOUS; SUBCUTANEOUS
Qty: 10 ML | Refills: 0 | Status: SHIPPED | OUTPATIENT
Start: 2024-01-16

## 2024-01-16 RX ORDER — INSULIN LISPRO 100 [IU]/ML
1-6 INJECTION, SOLUTION INTRAVENOUS; SUBCUTANEOUS
Status: DISCONTINUED | OUTPATIENT
Start: 2024-01-16 | End: 2024-01-16 | Stop reason: HOSPADM

## 2024-01-16 RX ORDER — INSULIN LISPRO 100 [IU]/ML
15 INJECTION, SOLUTION INTRAVENOUS; SUBCUTANEOUS
Status: DISCONTINUED | OUTPATIENT
Start: 2024-01-16 | End: 2024-01-16 | Stop reason: HOSPADM

## 2024-01-16 RX ORDER — INSULIN DEGLUDEC INJECTION 100 U/ML
30 INJECTION, SOLUTION SUBCUTANEOUS
Qty: 9 ML | Refills: 0 | OUTPATIENT
Start: 2024-01-16 | End: 2024-02-15

## 2024-01-16 RX ORDER — INSULIN LISPRO 100 [IU]/ML
1-5 INJECTION, SOLUTION INTRAVENOUS; SUBCUTANEOUS
Status: DISCONTINUED | OUTPATIENT
Start: 2024-01-16 | End: 2024-01-16 | Stop reason: HOSPADM

## 2024-01-16 RX ADMIN — CHLORHEXIDINE GLUCONATE 15 ML: 1.2 SOLUTION ORAL at 09:12

## 2024-01-16 RX ADMIN — INSULIN DETEMIR 30 UNITS: 100 INJECTION, SOLUTION SUBCUTANEOUS at 12:16

## 2024-01-16 RX ADMIN — INSULIN LISPRO 1 UNITS: 100 INJECTION, SOLUTION INTRAVENOUS; SUBCUTANEOUS at 12:18

## 2024-01-16 RX ADMIN — INSULIN LISPRO 15 UNITS: 100 INJECTION, SOLUTION INTRAVENOUS; SUBCUTANEOUS at 12:16

## 2024-01-16 RX ADMIN — HYDROXYCHLOROQUINE SULFATE 200 MG: 200 TABLET, FILM COATED ORAL at 10:01

## 2024-01-16 RX ADMIN — FAMOTIDINE 20 MG: 20 TABLET, FILM COATED ORAL at 09:12

## 2024-01-16 RX ADMIN — LEVOTHYROXINE SODIUM 25 MCG: 25 TABLET ORAL at 05:00

## 2024-01-16 NOTE — UTILIZATION REVIEW
"Initial Clinical Review    Admission: Date/Time/Statement:   Admission Orders (From admission, onward)       Ordered        01/15/24 0030  Inpatient Admission  Once                          Orders Placed This Encounter   Procedures    Inpatient Admission     Standing Status:   Standing     Number of Occurrences:   1     Order Specific Question:   Level of Care     Answer:   Critical Care [15]     Order Specific Question:   Estimated length of stay     Answer:   More than 2 Midnights     Order Specific Question:   Certification     Answer:   I certify that inpatient services are medically necessary for this patient for a duration of greater than two midnights. See H&P and MD Progress Notes for additional information about the patient's course of treatment.     ED Arrival Information       Expected   -    Arrival   1/14/2024 22:11    Acuity   Emergent              Means of arrival   Walk-In    Escorted by   Self    Service   Hospitalist    Admission type   Emergency              Arrival complaint   Chest Pain/Hyperglycemia             Chief Complaint   Patient presents with    Hyperglycemia - Symptomatic     Pt states she has been having issues with her insulin pump, and has been having increased sugars over the past few weeks. Today states she developed CP, N/V and weakness. Pt states she believes she is in DKA.    Chest Pain       Initial Presentation: 28 y.o. female who presents with DKA. PMHx type 1 DM, lupus, hashimoto's thyroiditis. Pt states she had been using Omni pod, then started having issues with leaking and adhesive causing skin irritation so she switched back to basal bolus of 38 units tresiba with carb correction at meals a few weeks ago. States she has been having trouble controlling sugars for about a week and today she developed N/V/abd pain along with some chest pain, SOB and urinary frequency and her glucometer was reading \"high\" so she came to ED. Presenting glucose was 747, venous pH 7.29, anion " gap 25. She has gotten 2L and 6 units insulin in ED. Plan: Inpatient admission for evaluation and treatment of DKA, Lupus, Hashimoto's thyroiditis: DKA insulin drip, BMP, Mg, Phos q 4 hrs, IV fluids, HgbA1c, NPO until gap closed x2, continue hydroxychlorquine, levothyroxine.     Endocrinology consult: Patient continues to be nauseous and appetite has not returned yet.  Was given clear liquids for breakfast however did not consume.  Has got lunch tray with Zofran, will hold off on transitioning to basal bolus insulin at this time until patient is able to tolerate diet.  She can be continued on non-DKA protocol insulin gtt. today and will attempt to transition tomorrow based on requirements.     Date:    Day 2:     Endocrinology: Patient is tolerating diet well since yesterday afternoon, reports appetite has improved and she insists on going home today because of family obligations. I saw patient in the morning and transitioned her to basal bolus regimen with Lantus 30 units and lispro 15 units 3 times daily premeals.  Received Lantus and prelunch lispro with optimal blood glucose values this afternoon. Her insulin requirements and glycemic control with current transition shows that she required less in the hospital than what she reports to be taking at home which may indicate that her prescription for basal bolus insulin at home is /denatured. Spoke to primary team and advised to prescribe new fills of Tresiba and NovoLog on discharge. As we do not have a 24-hour data to see how she does on a basal bolus regimen at the current dose, she can continue on the same with Tresiba 30 units at bedtime and NovoLog 15 units 3 times daily premeals     ED Triage Vitals   Temperature Pulse Respirations Blood Pressure SpO2   24 2300 24 2219 24 22124 22124   97.9 °F (36.6 °C) (!) 117 (!) 30 136/95 98 %      Temp Source Heart Rate Source Patient Position - Orthostatic VS BP Location  FiO2 (%)   01/14/24 2300 01/14/24 2219 01/14/24 2219 01/14/24 2219 --   Oral Monitor Lying Right arm       Pain Score       01/14/24 2239       8          Wt Readings from Last 1 Encounters:   01/16/24 61.6 kg (135 lb 12.9 oz)     Additional Vital Signs:     Date/Time Temp Pulse Resp BP MAP (mmHg) SpO2 O2 Device   01/16/24 0700 98.7 °F (37.1 °C) 84 18 98/57 74 99 % --   01/15/24 1900 98.4 °F (36.9 °C) 93 20 94/60 72 98 % None (Room air)   01/15/24 1400 -- -- -- 113/84 95 -- --   01/15/24 1300 -- 80 14 105/68 80 100 % --   01/15/24 1200 -- 83 14 89/61 Abnormal  70 100 % --   01/15/24 1100 98 °F (36.7 °C) -- 16 -- -- -- None (Room air)   01/15/24 1000 -- 72 13 88/65 Abnormal  73 100 % --   01/15/24 0900 -- 75 13 94/66 77 100 % --   01/15/24 0800 -- 64 14 96/75 82 100 % --   01/15/24 0700 98.3 °F (36.8 °C) 77 16 103/74 85 100 % --   01/15/24 0600 -- 63 12 87/63 Abnormal  72 100 % --   01/15/24 0500 -- 67 13 85/54 Abnormal  65 99 % --   01/15/24 0400 -- 77 17 94/62 73 98 % --   01/15/24 0300 -- 82 18 100/67 79 98 % --   01/15/24 0200 -- 83 12 98/63 76 100 % --   01/15/24 0149 -- 84 17 104/66 80 100 % --   01/15/24 0148 99.5 °F (37.5 °C) -- -- -- -- -- --   01/15/24 0101 -- 99 20 128/72 -- 98 % None (Room air)   01/14/24 2300 97.9 °F (36.6 °C) 94 24 Abnormal  -- -- 98 % None (Room air)     Pertinent Labs/Diagnostic Test Results:   CT chest abdomen pelvis w contrast   Final Result by Hill Mccord MD (01/15 0012)      No acute inflammatory process identified within the chest, abdomen, or pelvis.         Workstation performed: [id number]         XR chest 1 view portable   ED Interpretation by Kiko Olvera MD (01/14 5694)   No evidence  of acute cardiopulmonary pathology      Final Result by Renato Feliciano MD (01/15 2523)      No acute cardiopulmonary disease.                  Workstation performed: [id number]           1/15 EKG:  Normal sinus rhythm  Normal ECG  When compared with ECG of 14-JAN-2024 22:37,  (unconfirmed)  No significant change was found    Results from last 7 days   Lab Units 01/14/24  2240   SARS-COV-2  Negative     Results from last 7 days   Lab Units 01/16/24  0513 01/15/24  0419 01/14/24  2238   WBC Thousand/uL 5.62 6.75 8.36   HEMOGLOBIN g/dL 11.9 11.2* 14.1   HEMATOCRIT % 35.9 33.1* 43.0   PLATELETS Thousands/uL 180 180 217   NEUTROS ABS Thousands/µL 2.52 3.68 5.00         Results from last 7 days   Lab Units 01/16/24  0513 01/15/24  0806 01/15/24  0414 01/15/24  0100 01/14/24  2238   SODIUM mmol/L 138 137 135 135 129*   POTASSIUM mmol/L 3.5 3.6 3.5 3.8 4.1   CHLORIDE mmol/L 105 106 105 104 91*   CO2 mmol/L 22 23 23 17* 13*   ANION GAP mmol/L 11 8 7 14 25   BUN mg/dL 6 6 8 11 15   CREATININE mg/dL 0.75 0.64 0.59* 0.77 0.96   EGFR ml/min/1.73sq m 110 123 126 106 81   CALCIUM mg/dL 8.5 8.4 8.0* 8.6 9.6   MAGNESIUM mg/dL 1.9 1.8* 1.9 2.4 1.8*   PHOSPHORUS mg/dL 3.7 4.7* 2.4* 1.1*  --      Results from last 7 days   Lab Units 01/16/24  0513 01/15/24  0806 01/14/24  2238   AST U/L 16 13 19   ALT U/L 12 12 18   ALK PHOS U/L 42 41 75   TOTAL PROTEIN g/dL 5.7* 5.3* 7.9   ALBUMIN g/dL 3.4* 3.2* 4.7   TOTAL BILIRUBIN mg/dL 0.77 0.63 1.11*   BILIRUBIN DIRECT mg/dL  --  0.11  --      Results from last 7 days   Lab Units 01/16/24  1000 01/16/24  0743 01/16/24  0650 01/16/24  0408 01/16/24  0200 01/15/24  2354 01/15/24  2152 01/15/24  2004 01/15/24  1800 01/15/24  1651 01/15/24  1418 01/15/24  1205   POC GLUCOSE mg/dl 307* 323* 296* 115 227* 74 138 231* 338* 344* 90 189*     Results from last 7 days   Lab Units 01/16/24  0513 01/15/24  0806 01/15/24  0414 01/15/24  0100 01/14/24  2238   GLUCOSE RANDOM mg/dL 181* 182* 207* 290* 747*         Results from last 7 days   Lab Units 01/14/24  2238   HEMOGLOBIN A1C % 9.6*   EAG mg/dl 229     BETA-HYDROXYBUTYRATE   Date Value Ref Range Status   01/14/2024 5.3 (H) <0.6 mmol/L Final   09/26/2022 5.1 (H) <0.6 mmol/L Final   10/22/2019 0.4 <0.6 mmol/L Final   09/22/2019  0.1 <0.6 mmol/L Final          Results from last 7 days   Lab Units 01/14/24  2238   PH ISADORA  7.294*   PCO2 ISADORA mm Hg 28.6*   PO2 ISADORA mm Hg 46.7*   HCO3 ISADORA mmol/L 13.6*   BASE EXC ISADORA mmol/L -11.3   O2 CONTENT ISADORA ml/dL 16.5   O2 HGB, VENOUS % 78.2             Results from last 7 days   Lab Units 01/14/24  2239   HS TNI 0HR ng/L 2     Results from last 7 days   Lab Units 01/14/24  2243   D-DIMER QUANTITATIVE ug/ml FEU 0.46     Results from last 7 days   Lab Units 01/14/24  2243   PROTIME seconds 13.3   INR  0.95   PTT seconds 23     Results from last 7 days   Lab Units 01/14/24  2238   TSH 3RD GENERATON uIU/mL 2.490         Results from last 7 days   Lab Units 01/15/24  0414 01/15/24  0148 01/14/24  2235   LACTIC ACID mmol/L 1.3 3.2* 2.8*           Results from last 7 days   Lab Units 01/14/24  2238   LIPASE u/L 31                 Results from last 7 days   Lab Units 01/14/24  2255   CLARITY UA  Clear   COLOR UA  Colorless   SPEC GRAV UA  1.027   PH UA  5.0   GLUCOSE UA mg/dl >=1000 (1%)*   KETONES UA mg/dl 150 (4+)*   BLOOD UA  Negative   PROTEIN UA mg/dl Negative   NITRITE UA  Negative   BILIRUBIN UA  Negative   UROBILINOGEN UA (BE) mg/dl <2.0   LEUKOCYTES UA  Elevated glucose may cause decreased leukocyte values. See urine microscopic for UWBC result*   WBC UA /hpf 1-2   RBC UA /hpf 1-2   BACTERIA UA /hpf Occasional   EPITHELIAL CELLS WET PREP /hpf None Seen     Results from last 7 days   Lab Units 01/14/24  2240   INFLUENZA A PCR  Negative   INFLUENZA B PCR  Negative   RSV PCR  Negative           Results from last 7 days   Lab Units 01/14/24  2255 01/14/24 2235   BLOOD CULTURE  No Growth at 24 hrs. No Growth at 24 hrs.   URINE CULTURE  No Growth <1000 cfu/mL  --          ED Treatment:   Medication Administration from 01/14/2024 2211 to 01/15/2024 0132         Date/Time Order Dose Route Action     01/14/2024 2240 EST lactated ringers bolus 2,000 mL 2,000 mL Intravenous New Bag     01/14/2024 2239 EST ondansetron  (ZOFRAN) injection 4 mg 4 mg Intravenous Given     01/14/2024 2239 EST morphine injection 4 mg 4 mg Intravenous Given     01/14/2024 2351 EST magnesium sulfate 2 g/50 mL IVPB (premix) 2 g 2 g Intravenous New Bag     01/15/2024 0000 EST insulin regular (HumuLIN R,NovoLIN R) injection 6 Units 6 Units Intravenous Given     01/14/2024 2331 EST iohexol (OMNIPAQUE) 350 MG/ML injection (MULTI-DOSE) 80 mL 80 mL Intravenous Given     01/14/2024 2355 EST morphine injection 2 mg 2 mg Intravenous Given     01/15/2024 0033 EST potassium chloride 20 mEq IVPB (premix) 20 mEq Intravenous New Bag     01/15/2024 0021 EST lactated ringers bolus 1,000 mL 1,000 mL Intravenous New Bag     01/15/2024 0039 EST insulin regular (HumuLIN R,NovoLIN R) 1 Units/mL in sodium chloride 0.9 % 100 mL infusion 5.9 Units/hr Intravenous New Bag          Past Medical History:   Diagnosis Date    Abdominal pain     Anaphylaxis     Anxiety     Anxiety and depression     COVID-19 12/2020    Delayed emergence from anesthesia 03/23/2023    Severe episode of emergence delirium (confused, combative) after MAC anesthetic on 03/2023 for EGD. Received versed 2mg, >50mcg precedex, 5mg IV haldol, and 50mcg fentanyl. Waxing and waning episodes of agitation. Any future anesthetics should NOT be done at [facility].    Delirium, induced by drug     anesthesia    Depression     Diabetes mellitus (HCC)     Disease of thyroid gland     Hashimoto    DKA, type 1 (HCC) 05/11/2021    Eating disorder     history of anorexia/bulemia 7996-5228    Food intolerance     Fracture of fibula     R Salter I    Gilbert disease     Hashimoto's disease 02/21/2020    Head injury     Headache(784.0)     Nasal congestion     PTSD (post-traumatic stress disorder)     Rectal bleed     Seizures (HCC)     Type 1 diabetes (HCC)      Present on Admission:   Diabetic ketoacidosis associated with type 1 diabetes mellitus (HCC)   Hashimoto's thyroiditis   Lupus (HCC)      Admitting Diagnosis: Hypomagnesemia  [E83.42]  DKA (diabetic ketoacidosis) ([facility]) [E11.10]  Hyperglycemia [R73.9]  Age/Sex: 28 y.o. female  Admission Orders:  Scheduled Medications:  chlorhexidine, 15 mL, Mouth/Throat, Q12H ROQUE  enoxaparin, 40 mg, Subcutaneous, Daily  famotidine, 20 mg, Oral, Daily  folic acid, 1 mg, Oral, Daily  gabapentin, 600 mg, Oral, Daily  hydroxychloroquine, 200 mg, Oral, BID  levothyroxine, 25 mcg, Oral, Early Morning      Continuous IV Infusions:  insulin regular (HumuLIN R,NovoLIN R) 1 Units/mL in sodium chloride 0.9 % 100 mL infusion, 0.3-21 Units/hr, Intravenous, Titrated      PRN Meds:  acetaminophen, 975 mg, Oral, Q6H PRN  LORazepam, 0.5 mg, Oral, BID PRN  ondansetron, 4 mg, Intravenous, Q6H PRN        IP CONSULT TO CASE MANAGEMENT  IP CONSULT TO ENDOCRINOLOGY    Network Utilization Review Department  ATTENTION: Please call with any questions or concerns to [phone] and carefully listen to the prompts so that you are directed to the right person. All voicemails are confidential.   For Discharge needs, contact Care Management DC Support Team at [phone] opt. [phone]  Send all requests for admission clinical reviews, approved or denied determinations and any other requests to dedicated fax number below belonging to the campus where the patient is receiving treatment. List of dedicated fax numbers for the Facilities:  FACILITY NAME UR FAX NUMBER   ADMISSION DENIALS (Administrative/Medical Necessity) [phone]   DISCHARGE SUPPORT TEAM (NETWORK) [phone]   PARENT CHILD HEALTH (Maternity/NICU/Pediatrics) [phone]   [facility] [phone]   [facility] [phone]   [facility] [phone]   [facility] [phone]   [facility] [phone]   [facility] [phone]   [facility] [phone]   [facility]  [place] [phone]   [facility] [phone]   [facility] [phone]   [facility] [phone]

## 2024-01-16 NOTE — ASSESSMENT & PLAN NOTE
Patient is on levothyroxine 25mcg daily     Plan:  Continue home levothyroxine  Follow up with endocrinology and PCP

## 2024-01-16 NOTE — ASSESSMENT & PLAN NOTE
Lab Results   Component Value Date    HGBA1C 9.6 (H) 01/14/2024       Recent Labs     01/16/24  0743 01/16/24  1000 01/16/24  1157 01/16/24  1409   POCGLU 323* 307* 171* 93         Blood Sugar Average: Last 72 hrs:  (P) [id number]    History of type 1 DM. Pt had been using Omni pod, then started having issues with leaking and adhesive so she switched back to basal bolus with carb correction at meals a few weeks ago. States she has been having trouble controlling sugars for about a week and today she developed N/V/abd pain along with some chest pain, SOB and urinary frequency and her glucometer was reading high so she came to ED. Presenting glucose was 747, venous pH 7.29, anion gap 25. She has gotten 2L and 6 units insulin in ED. Admitted to ICU for DKA protocol insulin drip and IVF.    Plan  DKA insulin drip  BMP, Magnesium, Phos q 4 hrs with repletion as needed  IVF per DKA protocol  HBA1c  NPO except sips with meds until gap closed x2

## 2024-01-16 NOTE — CASE MANAGEMENT
Case Management Discharge Planning Note    Patient name Jenny Rodrigues  Location S /S -01 MRN [id number]  : 1995 Date 2024       Current Admission Date: 2024  Current Admission Diagnosis:Diabetic ketoacidosis associated with type 1 diabetes mellitus (HCC)   Patient Active Problem List    Diagnosis Date Noted    Lupus (HCC) 01/15/2024    Pre-conception counseling 10/06/2023    Other forms of systemic lupus erythematosus (HCC) 09/15/2023    Idiopathic hypotension 2023    Pigmentation abnormality of skin 2023    Hearing loss of right ear 2023    Undifferentiated connective tissue disease (HCC) 2023    Delayed emergence from anesthesia 2023    Controlled diabetes mellitus type 1 with complications ([facility]) 2023    Bruising 10/19/2022    Constipation 2022    Gastroesophageal reflux disease 2022    Other chest pain 2022    Physical deconditioning 2021    Right-sided back pain 2021    Verruca 2021    Nail abnormality 2021    Primary hypothyroidism 2021    Type 1 diabetes mellitus (HCC) 2021    Type 1 diabetes mellitus with hyperglycemia ([facility]) 2021    Uncontrolled type 1 diabetes mellitus with hyperglycemia (HCC) 2021    Elevated prolactin level 2021    Fatty stools 2021    Left low back pain 2021    Hyperlipidemia 2021    Visual hallucinations 2021    Diabetic ketoacidosis associated with type 1 diabetes mellitus (HCC) 2021    Rheumatoid factor positive 2021    History of anesthesia complications 2021    Seizures ([facility])     Positive SOHAM (antinuclear antibody) 2021    Weakness generalized 2021    Low serum vitamin B12 2021    Arthralgia 2021    Bipolar affective disorder (HCC) 2021    Vulvodynia 2020    Yeast infection 2020    Syncope     Chronic back pain 10/16/2020    Insulin pump status 2020     Muscle weakness 09/03/2020    Word finding difficulty 09/03/2020    Neuropathy 06/23/2020    Polyarthritis 06/23/2020    Secondary amenorrhea 06/23/2020    Vitamin D deficiency 06/19/2020    Paresthesia of left leg 04/21/2020    Hashimoto's thyroiditis 02/21/2020    Dysuria 02/21/2020    Visual changes 01/21/2020    Chronic pain of right knee 10/15/2019    Chronic abdominal pain 10/15/2019    Blood in the stool 10/15/2019    OCD (obsessive compulsive disorder) 09/26/2019    Activity intolerance 09/10/2019    Chronic post-traumatic stress disorder (PTSD) 08/12/2019    Generalized anxiety disorder with panic attacks 08/12/2019    Nausea and vomiting 08/06/2019    Agitation 08/06/2019    Concussion without loss of consciousness 07/02/2019    Urinary frequency 06/06/2019    Abnormal stools 02/07/2019    Uncontrolled type 1 diabetes mellitus with hypoglycemia without coma (HCC) 01/29/2019    Abnormal liver function test 12/13/2018    Right sided abdominal pain 12/13/2018    Chronic fatigue and malaise 12/13/2018    Patellofemoral pain syndrome of right knee 09/26/2018      LOS (days): 1  Geometric Mean LOS (GMLOS) (days):   Days to GMLOS:     OBJECTIVE:  Risk of Unplanned Readmission Score: 11.61         Current admission status: Inpatient   Preferred Pharmacy:   [facility] #[id number] - [place] - [address] [place] [address] Street  [address] [place] [address]  [place] [address]  Phone: [phone] Fax: [phone]    [facility] - [place] - [address] [facility] [address] [address]  [address]  [place] [address]  Phone: [phone] Fax: [phone]    [facility] - [place], [place] - [address]  [address]  [address]  [facility] [address]  Phone: [phone] Fax: [phone]    Primary Care Provider: MARGARET Meadows    Primary Insurance: BLUE CROSS  Secondary Insurance: [facility] REP    DISCHARGE DETAILS:    Discharge planning discussed with:: patient  Freedom of Choice:  Yes  Comments - Freedom of Choice: CM met with patient at bedside re: consult received for dispo planning. Patient lives with spouse and parents in two story home, 2 [address]OM[address], second floor bed/bath (half bath on first floor. Patient independent with ADLS (spouse assists as needed), does not use any dme to ambulate, has hx of OPPT, and drives self to appointments. Confirmed PCP (Arnie) and preferred pharmacy ([facility]). Patient relayed her car is currently in ED parking lot and will drive self home or will call  if she not feeling well enough to drive when ready for d/c. No CM d/c needs identified at this time, CM remains available.                                         Treatment Team Recommendation: Home  Discharge Destination Plan:: Home  Transport at Discharge : Self                          IMM reviewed with patient, patient agrees with discharge determination.  IMM Given (Date):: 01/16/24  IMM Given to:: Patient

## 2024-01-16 NOTE — DISCHARGE INSTR - AVS FIRST PAGE
Dear Jenny Rodrigues,     It was our pleasure to care for you here at [facility].  It is our hope that we were always able to exceed the expected standards for your care during your stay.  You were hospitalized due to diabetic ketoacidosis.  You were cared for on the south third floor by Avelina Mujica MD under the service of Vickie Yoo MD with the [facility] who covers for your primary care physician (PCP), MARGARET Meadows, while you were hospitalized.  If you have any questions or concerns related to this hospitalization, you may contact us at .  For follow up as well as any medication refills, we recommend that you follow up with your primary care physician.  A registered nurse will reach out to you by phone within a few days after your discharge to answer any additional questions that you may have after going home.  However, at this time we provide for you here, the most important instructions / recommendations at discharge:     Notable Medication Adjustments -   Start taking Tresiba 30 units at bedtime daily  Start taking NovoLog 15 units 3 times daily before meals  Continue other home medications for lupus and Hashimoto's thyroiditis  Testing Required after Discharge -   None  ** Please contact your PCP to request testing orders for any of the testing recommended here **  Important follow up information -   Follow-up with PCP 1 week after discharge  Follow-up with outpatient endocrinology  Other Instructions -   Be compliant with insulin and take insulin as per instruction above  Check blood sugar  Continue taking low CHO diet  Please review this entire after visit summary as additional general instructions including medication list, appointments, activity, diet, any pertinent wound care, and other additional recommendations from your care team that may be provided for you.      Sincerely,     Avelina Mujica MD

## 2024-01-16 NOTE — PLAN OF CARE
Problem: PAIN - ADULT  Goal: Verbalizes/displays adequate comfort level or baseline comfort level  Description: Interventions:  - Encourage patient to monitor pain and request assistance  - Assess pain using appropriate pain scale  - Administer analgesics based on type and severity of pain and evaluate response  - Implement non-pharmacological measures as appropriate and evaluate response  - Consider cultural and social influences on pain and pain management  - Notify physician/advanced practitioner if interventions unsuccessful or patient reports new pain  Outcome: Progressing     Problem: INFECTION - ADULT  Goal: Absence or prevention of progression during hospitalization  Description: INTERVENTIONS:  - Assess and monitor for signs and symptoms of infection  - Monitor lab/diagnostic results  - Monitor all insertion sites, i.e. indwelling lines, tubes, and drains  - Monitor endotracheal if appropriate and nasal secretions for changes in amount and color  - [place] appropriate cooling/warming therapies per order  - Administer medications as ordered  - Instruct and encourage patient and family to use good hand hygiene technique  - Identify and instruct in appropriate isolation precautions for identified infection/condition  Outcome: Progressing  Goal: Absence of fever/infection during neutropenic period  Description: INTERVENTIONS:  - Monitor WBC    Outcome: Progressing     Problem: SAFETY ADULT  Goal: Patient will remain free of falls  Description: INTERVENTIONS:  - Educate patient/family on patient safety including physical limitations  - Instruct patient to call for assistance with activity   - Consult OT/PT to assist with strengthening/mobility   - Keep Call bell within reach  - Keep bed low and locked with side rails adjusted as appropriate  - Keep care items and personal belongings within reach  - Initiate and maintain comfort rounds  - Make Fall Risk Sign visible to staff  - Offer Toileting every 2 Hours,  in advance of need  - Initiate/Maintain bed and chair alarm  - Obtain necessary fall risk management equipment:   - Apply yellow socks and bracelet for high fall risk patients  - Consider moving patient to room near nurses station  Outcome: Progressing  Goal: Maintain or return to baseline ADL function  Description: INTERVENTIONS:  -  Assess patient's ability to carry out ADLs; assess patient's baseline for ADL function and identify physical deficits which impact ability to perform ADLs (bathing, care of mouth/teeth, toileting, grooming, dressing, etc.)  - Assess/evaluate cause of self-care deficits   - Assess range of motion  - Assess patient's mobility; develop plan if impaired  - Assess patient's need for assistive devices and provide as appropriate  - Encourage maximum independence but intervene and supervise when necessary  - Involve family in performance of ADLs  - Assess for home care needs following discharge   - Consider OT consult to assist with ADL evaluation and planning for discharge  - Provide patient education as appropriate  Outcome: Progressing  Goal: Maintains/Returns to pre admission functional level  Description: INTERVENTIONS:  - Perform AM-PAC 6 Click Basic Mobility/ Daily Activity assessment daily.  - Set and communicate daily mobility goal to care team and patient/family/caregiver.   - Collaborate with rehabilitation services on mobility goals if consulted  - Perform Range of Motion 3 times a day.  - Reposition patient every 2 hours.  - Dangle patient 3 times a day  - Stand patient 3 times a day  - Ambulate patient 3 times a day  - Out of bed to chair 3 times a day   - Out of bed for meals 3 times a day  - Out of bed for toileting  - Record patient progress and toleration of activity level   Outcome: Progressing     Problem: DISCHARGE PLANNING  Goal: Discharge to home or other facility with appropriate resources  Description: INTERVENTIONS:  - Identify barriers to discharge w/patient and  caregiver  - Arrange for needed discharge resources and transportation as appropriate  - Identify discharge learning needs (meds, wound care, etc.)  - Arrange for interpretive services to assist at discharge as needed  - Refer to Case Management Department for coordinating discharge planning if the patient needs post-hospital services based on physician/advanced practitioner order or complex needs related to functional status, cognitive ability, or social support system  Outcome: Progressing     Problem: Knowledge Deficit  Goal: Patient/family/caregiver demonstrates understanding of disease process, treatment plan, medications, and discharge instructions  Description: Complete learning assessment and assess knowledge base.  Interventions:  - Provide teaching at level of understanding  - Provide teaching via preferred learning methods  Outcome: Progressing     Problem: METABOLIC, FLUID AND ELECTROLYTES - ADULT  Goal: Electrolytes maintained within normal limits  Description: INTERVENTIONS:  - Monitor labs and assess patient for signs and symptoms of electrolyte imbalances  - Administer electrolyte replacement as ordered  - Monitor response to electrolyte replacements, including repeat lab results as appropriate  - Instruct patient on fluid and nutrition as appropriate  Outcome: Progressing  Goal: Fluid balance maintained  Description: INTERVENTIONS:  - Monitor labs   - Monitor I/O and WT  - Instruct patient on fluid and nutrition as appropriate  - Assess for signs & symptoms of volume excess or deficit  Outcome: Progressing  Goal: Glucose maintained within target range  Description: INTERVENTIONS:  - Monitor Blood Glucose as ordered  - Assess for signs and symptoms of hyperglycemia and hypoglycemia  - Administer ordered medications to maintain glucose within target range  - Assess nutritional intake and initiate nutrition service referral as needed  Outcome: Progressing     Problem: Nutrition/Hydration-ADULT  Goal:  Nutrient/Hydration intake appropriate for improving, restoring or maintaining nutritional needs  Description: Monitor and assess patient's nutrition/hydration status for malnutrition. Collaborate with interdisciplinary team and initiate plan and interventions as ordered.  Monitor patient's weight and dietary intake as ordered or per policy. Utilize nutrition screening tool and intervene as necessary. Determine patient's food preferences and provide high-protein, high-caloric foods as appropriate.     INTERVENTIONS:  - Monitor oral intake, urinary output, labs, and treatment plans  - Assess nutrition and hydration status and recommend course of action  - Evaluate amount of meals eaten  - Assist patient with eating if necessary   - Allow adequate time for meals  - Recommend/ encourage appropriate diets, oral nutritional supplements, and vitamin/mineral supplements  - Order, calculate, and assess calorie counts as needed  - Recommend, monitor, and adjust tube feedings and TPN/PPN based on assessed needs  - Assess need for intravenous fluids  - Provide specific nutrition/hydration education as appropriate  - Include patient/family/caregiver in decisions related to nutrition  Outcome: Progressing

## 2024-01-16 NOTE — ASSESSMENT & PLAN NOTE
History of lupus. Pt following with endocrinologist at [facility]. On weekly methotrexate injections and hydroxychlorquine 200mg BID    Plan:  Continue home hydroxychorquine

## 2024-01-16 NOTE — ASSESSMENT & PLAN NOTE
Lab Results   Component Value Date    HGBA1C 9.6 (H) 01/14/2024       Recent Labs     01/16/24  0743 01/16/24  1000 01/16/24  1157 01/16/24  1409   POCGLU 323* 307* 171* 93       Blood Sugar Average: Last 72 hrs:  (P) 219.9117156386147120    Patient came in with nausea, vomiting, abdominal pain, chest pain and shortness of breath  In the ED she was found to have glucose 747, venous pH 7.29 and anion gap 25 and treated with 2 L fluid and 6 units insulin  Then she got admitted into the ICU for DKA management with DKA protocol insulin drip and IVF  At home she has been using Omni pod but recently having issues with leaking and irritation of the surrounding skin so she switched back to basal bolus with carb correction meal time a few weeks ago.  She was having trouble to control her sugar for a week  CT abdomen pelvis and CXR were unremarkable  UA not significant for infection  After getting treatment in the ICU her anion gap got closed today it is 11 and blood glucose level improved then she has been transferred to the [facility] surg  She is tolerating food well since yesterday without any nausea, vomiting  Morning glucose was 323 when she was on insulin drip  Around noon patient was being switched to detemir and lispro and her glucose level was below 100 after lunch time lispro  DKA with the H/O DM1 secondary to missed insulin regimen of correct order    Plan  Endocrinology consulted as per their recommendation patient will be on Tresiba 30 units at bedtime and lispro 15 units TID before meal   Both primary team and endo discussed about the importance to stay in the hospital tonight to observe her on basal bolus insulin she refused to stay due to having family program  Follow up with outpatient endocrinology  Monitor blood glucose to adjust dose during follow up  Follow up with PCP

## 2024-01-16 NOTE — UTILIZATION REVIEW
NOTIFICATION OF INPATIENT ADMISSION   AUTHORIZATION REQUEST   SERVICING FACILITY:   [place] [address]  Tax ID: [id number]  NPI: [id number]   ATTENDING PROVIDER:  Attending Name and NPI#: Vickie Yoo Md [[id number]]  Address: [address] [place] [address]  Phone: [phone]     ADMISSION INFORMATION:  Place of Service: Inpatient [facility]  Place of Service Code: 21  Inpatient Admission Date/Time: 1/15/24 12:30 AM  Discharge Date/Time: 1/16/2024  4:10 PM  Admitting Diagnosis Code/Description:  Hypomagnesemia [E83.42]  DKA (diabetic ketoacidosis) (HCC) [E11.10]  Hyperglycemia [R73.9]     UTILIZATION REVIEW CONTACT:  Cipriano Rosa Utilization   Network Utilization Review Department  Phone: [phone]  Fax: [phone]  Email: [email or web address]  Contact for approvals/pending authorizations, clinical reviews, and discharge.     PHYSICIAN ADVISORY SERVICES:  Medical Necessity Denial & Qkfr-at-Ameh Review  Phone: [phone]  Fax: [phone]  Email: [email or web address]     DISCHARGE SUPPORT TEAM:  For Patients Discharge Needs & Updates  Phone: [phone] opt. 2 Fax: [phone]  Email: [email or web address]

## 2024-01-16 NOTE — DISCHARGE SUMMARY
[facility]  Discharge- Jenny Rodrigues 1995, 28 y.o. female MRN: [id number]  Unit/Bed#: S -01 Encounter: [id number]  Primary Care Provider: MARGARET Meadows   Date and time admitted to hospital: 1/14/2024 10:14 PM    * Diabetic ketoacidosis associated with type 1 diabetes mellitus (HCC)  Assessment & Plan  Lab Results   Component Value Date    HGBA1C 9.6 (H) 01/14/2024       Recent Labs     01/16/24  0743 01/16/24  1000 01/16/24  1157 01/16/24  1409   POCGLU 323* 307* 171* 93       Blood Sugar Average: Last 72 hrs:  (P) [id number]    Patient came in with nausea, vomiting, abdominal pain, chest pain and shortness of breath  In the ED she was found to have glucose 747, venous pH 7.29 and anion gap 25 and treated with 2 L fluid and 6 units insulin  Then she got admitted into the ICU for DKA management with DKA protocol insulin drip and IVF  At home she has been using Omni pod but recently having issues with leaking and irritation of the surrounding skin so she switched back to basal bolus with carb correction meal time a few weeks ago.  She was having trouble to control her sugar for a week  CT abdomen pelvis and CXR were unremarkable  UA not significant for infection  After getting treatment in the ICU her anion gap got closed today it is 11 and blood glucose level improved then she has been transferred to the [facility]  She is tolerating food well since yesterday without any nausea, vomiting  Morning glucose was 323 when she was on insulin drip  Around noon patient was being switched to detemir and lispro and her glucose level was below 100 after lunch time lispro  DKA with the H/O DM1 secondary to missed insulin regimen of correct order    Plan  Endocrinology consulted as per their recommendation patient will be on Tresiba 30 units at bedtime and lispro 15 units TID before meal   Both primary team and endo discussed about the importance to stay in the hospital  tonight to observe her on basal bolus insulin she refused to stay due to having family program  Follow up with outpatient endocrinology  Monitor blood glucose to adjust dose during follow up  Follow up with PCP    Lupus ([facility])  Assessment & Plan  Has history of lupus.   Patient is following with endocrinologist at [facility].   On weekly methotrexate injections and hydroxychlorquine 200mg BID    Plan:  Continue home hydroxychlorquine, Methotrexate, and folic acid  Follow up with endocrinology and PCP    Hashimoto's thyroiditis  Assessment & Plan  Patient is on levothyroxine 25mcg daily     Plan:  Continue home levothyroxine  Follow up with endocrinology and PCP      Medical Problems       Resolved Problems  Date Reviewed: 1/16/2024   None       Discharging Physician / Practitioner: Avelina Mujica MD  PCP: MARGARET Meadows  Admission Date:   Admission Orders (From admission, onward)       Ordered        01/15/24 0030  Inpatient Admission  Once                          Discharge Date: 01/16/24    Consultations During Hospital Stay:  Endocrinology    Procedures Performed:   None    Significant Findings / Test Results:   Npne    Incidental Findings:   None   I reviewed the above mentioned incidental findings with the patient and/or family and they expressed understanding.    Test Results Pending at Discharge (will require follow up):   None     Outpatient Tests Requested:  None    Complications:  None    Reason for Admission: Nausea, vomiting, abdominal pain, chest pain, SOB    Hospital Course:   Jenny Rodrigues is a 28 y.o. female patient with PMH of lupus, hashimoto's Thyroiditis, type 1 DM on insulin who originally presented to the hospital on 1/14/2024 due to having nausea, vomiting, abdominal pain, chest pain, SOB. She recently found leaking and irritation of the skin under the Omni Pod. Then she switched to basal bolus insulin for last few weeks and then for a week she had trouble to manage her sugar with  "insulin, then she felt nausea, vomiting, abdominal pain, chest pain, SOB that brought her in the ED. She found to have glucose 747, anion gap 25 and pH 7.29 and given 2 L fluid, insulin 6 units then tranferred to  ICU for further management of DKA. In the ICU she was managed for her DKA and after improvement of her condition when she tolerated her lunch, got transferred to the [facility]. She was tolerating food and morning glucose today was 323 on insulin drip. Endocrinology consulted for switching to basal bolus. She was given basal bolus and observed that her sugar is well controlled. Though it has been discussed about the importance of stayong one more night for observing her sugar when she is on basal bolus she refused to stay and wanted to leave before 4 PM with knowing the risks that might happen. She has been discharged on tresiba 30 units at bedtime and lispro 15 units TID before meal with outpatient endocrinology follow up.      Please see above list of diagnoses and related plan for additional information.     Condition at Discharge: stable    Discharge Day Visit / Exam:   Subjective:    No acute overnight event. Patient was seen and examined at bedside this morning. She is tolerating food well since yesterday without any nausea, vomiting, abdominal pain, diarrhea.  She wants to go home before 4 PM.    Vitals: Blood Pressure: 98/57 (01/16/24 0700)  Pulse: 84 (01/16/24 0700)  Temperature: 98.7 °F (37.1 °C) (01/16/24 0700)  Temp Source: Oral (01/15/24 1900)  Respirations: 18 (01/16/24 0700)  Height: 5' 1\" (154.9 cm) (01/15/24 0149)  Weight - Scale: 61.6 kg (135 lb 12.9 oz) (01/16/24 0500)  SpO2: 99 % (01/16/24 0700)  Exam:   Physical Exam  Vitals and nursing note reviewed.   Constitutional:       General: She is not in acute distress.     Appearance: She is well-developed. She is not ill-appearing.   HENT:      Head: Normocephalic and atraumatic.   Eyes:      Extraocular Movements: Extraocular movements " intact.      Pupils: Pupils are equal, round, and reactive to light.   Cardiovascular:      Rate and Rhythm: Normal rate and regular rhythm.      Heart sounds: Normal heart sounds. No murmur heard.  Pulmonary:      Effort: Pulmonary effort is normal. No tachypnea or respiratory distress.      Breath sounds: Normal breath sounds.   Chest:      Chest wall: No tenderness or edema.   Abdominal:      General: Bowel sounds are normal.      Palpations: Abdomen is soft. There is no mass.      Tenderness: There is no abdominal tenderness. There is no guarding.   Musculoskeletal:         General: Normal range of motion.      Cervical back: Normal range of motion and neck supple.      Right lower leg: No tenderness. No edema.      Left lower leg: No tenderness. No edema.   Skin:     General: Skin is warm and dry.      Capillary Refill: Capillary refill takes less than 2 seconds.      Findings: No ecchymosis or rash.   Neurological:      General: No focal deficit present.      Mental Status: She is alert and oriented to person, place, and time.      Cranial Nerves: No cranial nerve deficit.      Motor: No weakness.   Psychiatric:         Mood and Affect: Mood normal.         Behavior: Behavior normal.          Discussion with Family: Updated  () at bedside.    Discharge instructions/Information to patient and family:   See after visit summary for information provided to patient and family.      Provisions for Follow-Up Care:  See after visit summary for information related to follow-up care and any pertinent home health orders.      Mobility at time of Discharge:   Basic Mobility Inpatient Raw Score: 24  JH-HLM Goal: 8: Walk 250 feet or more  JH-HLM Achieved: 8: Walk 250 feet ot more  HLM Goal achieved. Continue to encourage appropriate mobility.     Disposition:   Home    Planned Readmission: No     Discharge Statement:  I spent 45 minutes discharging the patient. This time was spent on the day of  discharge. I had direct contact with the patient on the day of discharge. Greater than 50% of the total time was spent examining patient, answering all patient questions, arranging and discussing plan of care with patient as well as directly providing post-discharge instructions.  Additional time then spent on discharge activities.    Discharge Medications:  See after visit summary for reconciled discharge medications provided to patient and/or family.      **Please Note: This note may have been constructed using a voice recognition system**

## 2024-01-16 NOTE — PROGRESS NOTES
"Progress Note - Jenny Rodrigues 28 y.o. female MRN: [id number]    Unit/Bed#: S -01 Encounter: [id number]      CC: diabetes f/u    Subjective:   Jenny Rodrigues is a 28 y.o. year old female with type 1 diabetes.  Feels well.  No complaints.  No hypoglycemia.  Tolerating diet, on insulin gtt. insistent on going home today    Objective:     Vitals: Blood pressure 98/57, pulse 84, temperature 98.7 °F (37.1 °C), resp. rate 18, height 5' 1\" (1.549 m), weight 61.6 kg (135 lb 12.9 oz), SpO2 99%, not currently breastfeeding.,Body mass index is 25.66 kg/m².      Intake/Output Summary (Last 24 hours) at 1/16/2024 1145  Last data filed at 1/16/2024 0900  Gross per 24 hour   Intake 1731.6 ml   Output 650 ml   Net 1081.6 ml       Physical Exam:  General Appearance: awake, appears stated age and cooperative  Head: Normocephalic, without obvious abnormality, atraumatic  Eyes: Conjunctiva normal  Neuro: Alert awake oriented x 3  Extremities: moves all extremities  Skin: Skin color and temperature normal.   Pulm: no labored breathing    Lab, Imaging and other studies:   Lab Results   Component Value Date    HGBA1C 9.6 (H) 01/14/2024       POC Glucose (mg/dl)   Date Value   01/16/2024 307 (H)   01/16/2024 323 (H)   01/16/2024 296 (H)   01/16/2024 115   01/16/2024 227 (H)   01/15/2024 74   01/15/2024 138   01/15/2024 231 (H)   01/15/2024 338 (H)   01/15/2024 344 (H)       Assessment:  Uncontrolled type 1 diabetes mellitus with diabetic ketoacidosis presentation  Assessment:  This is a 28 y.o.-year-old female with diabetic ketoacidosis in the setting of type 1 diabetes mellitis with recent history of OmniPod insulin pump failure, post prandial hyperglycemia on basal bolus regimen.  Home regimen: NovoLog 3 times daily premeals based on ICR 1: 6.  Tresiba 38 units at bedtime  A1c: 9.6    Plan:    Patient is tolerating diet well since yesterday afternoon, reports appetite has improved and she insists on going home today because of " family obligations  I saw patient in the morning and transitioned her to basal bolus regimen with Lantus 30 units and lispro 15 units 3 times daily premeals.  Received Lantus and prelunch lispro with optimal blood glucose values this afternoon.  Her insulin requirements and glycemic control with current transition shows that she required less in the hospital than what she reports to be taking at home which may indicate that her prescription for basal bolus insulin at home is /denatured.  Spoke to primary team and advised to prescribe new fills of Tresiba and NovoLog on discharge.  As we do not have a 24-hour data to see how she does on a basal bolus regimen at the current dose, she can continue on the same with Tresiba 30 units at bedtime and NovoLog 15 units 3 times daily premeals  Patient has follow-up appointment with endocrinology as outpatient, regimen can be further adjusted then and her previous pump malfunction can also be addressed.      Portions of the record may have been created with voice recognition software.

## 2024-01-16 NOTE — CASE MANAGEMENT
Case Management Assessment & Discharge Planning Note    Patient name Jenny Rodrigues  Location S /S -01 MRN [id number]  : 1995 Date 2024       Current Admission Date: 2024  Current Admission Diagnosis:Diabetic ketoacidosis associated with type 1 diabetes mellitus (HCC)   Patient Active Problem List    Diagnosis Date Noted    Lupus (HCC) 01/15/2024    Pre-conception counseling 10/06/2023    Other forms of systemic lupus erythematosus (HCC) 09/15/2023    Idiopathic hypotension 2023    Pigmentation abnormality of skin 2023    Hearing loss of right ear 2023    Undifferentiated connective tissue disease (HCC) 2023    Delayed emergence from anesthesia 2023    Controlled diabetes mellitus type 1 with complications ([facility]) 2023    Bruising 10/19/2022    Constipation 2022    Gastroesophageal reflux disease 2022    Other chest pain 2022    Physical deconditioning 2021    Right-sided back pain 2021    Verruca 2021    Nail abnormality 2021    Primary hypothyroidism 2021    Type 1 diabetes mellitus (HCC) 2021    Type 1 diabetes mellitus with hyperglycemia ([facility]) 2021    Uncontrolled type 1 diabetes mellitus with hyperglycemia ([facility]) 2021    Elevated prolactin level 2021    Fatty stools 2021    Left low back pain 2021    Hyperlipidemia 2021    Visual hallucinations 2021    Diabetic ketoacidosis associated with type 1 diabetes mellitus (HCC) 2021    Rheumatoid factor positive 2021    History of anesthesia complications 2021    Seizures ([facility])     Positive SOHAM (antinuclear antibody) 2021    Weakness generalized 2021    Low serum vitamin B12 2021    Arthralgia 2021    Bipolar affective disorder (HCC) 2021    Vulvodynia 2020    Yeast infection 2020    Syncope     Chronic back pain 10/16/2020    Insulin pump status  09/30/2020    Muscle weakness 09/03/2020    Word finding difficulty 09/03/2020    Neuropathy 06/23/2020    Polyarthritis 06/23/2020    Secondary amenorrhea 06/23/2020    Vitamin D deficiency 06/19/2020    Paresthesia of left leg 04/21/2020    Hashimoto's thyroiditis 02/21/2020    Dysuria 02/21/2020    Visual changes 01/21/2020    Chronic pain of right knee 10/15/2019    Chronic abdominal pain 10/15/2019    Blood in the stool 10/15/2019    OCD (obsessive compulsive disorder) 09/26/2019    Activity intolerance 09/10/2019    Chronic post-traumatic stress disorder (PTSD) 08/12/2019    Generalized anxiety disorder with panic attacks 08/12/2019    Nausea and vomiting 08/06/2019    Agitation 08/06/2019    Concussion without loss of consciousness 07/02/2019    Urinary frequency 06/06/2019    Abnormal stools 02/07/2019    Uncontrolled type 1 diabetes mellitus with hypoglycemia without coma (HCC) 01/29/2019    Abnormal liver function test 12/13/2018    Right sided abdominal pain 12/13/2018    Chronic fatigue and malaise 12/13/2018    Patellofemoral pain syndrome of right knee 09/26/2018      LOS (days): 1  Geometric Mean LOS (GMLOS) (days):   Days to GMLOS:     OBJECTIVE:    Risk of Unplanned Readmission Score: 11.61         Current admission status: Inpatient       Preferred Pharmacy:   [facility]/pharmacy #[id number] - [place] - [address]  [address] [place] [address]  [place] [address]  Phone: [phone] Fax: [phone]    [facility] - [place] - [address]  [address]  [place] [address]  Phone: [phone] Fax: [phone]    [facility] - [place], [place] - [address]  [address]  [address]  [facility] [address]  Phone: [phone] Fax: [phone]    Primary Care Provider: MARGARET Meadows    Primary Insurance: BLUE CROSS  Secondary Insurance: [employer]  REP    ASSESSMENT:  Active Health Care Proxies    There are no active  Health Care Proxies on file.                      Patient Information  Admitted from:: Home  Mental Status: Alert  During Assessment patient was accompanied by: Not accompanied during assessment  Assessment information provided by:: Patient  Primary Caregiver: Self  Support Systems: Family members  County of Residence: [place]  What city do you live in?: [place]  Home entry access options. Select all that apply.: Stairs  Number of steps to enter home.: 2  Type of Current Residence: 2 story home  Upon entering residence, is there a bedroom on the main floor (no further steps)?: No  A bedroom is located on the following floor levels of residence (select all that apply):: 2nd Floor  Upon entering residence, is there a bathroom on the main floor (no further steps)?: Yes  Indicate which floors of current residence have a bathroom (select all the apply):: 2nd Floor  Number of steps to 2nd floor from main floor: One Flight  Living Arrangements: Lives w/ Spouse/significant other, Lives w/ Parent(s)    Activities of Daily Living Prior to Admission  Functional Status: Independent  Completes ADLs independently?: Yes  Ambulates independently?: Yes  Does patient use assisted devices?: No  Does patient currently own DME?: No  Does patient have a history of Outpatient Therapy (PT/OT)?: Yes  Does the patient have a history of Short-Term Rehab?: No  Does patient have a history of HHC?: No  Does patient currently have HHC?: No         Patient Information Continued  Does patient have prescription coverage?: Yes  Does patient receive dialysis treatments?: No         Means of Transportation  Means of Transport to [facility]:: Drives Self      Housing Stability: Unknown (1/16/2024)    Housing Stability Vital Sign     Unable to Pay for Housing in the Last Year: No     Number of Places Lived in the Last Year: Not on file     Unstable Housing in the Last Year: No   Food Insecurity: No Food Insecurity (1/16/2024)    Hunger Vital Sign      Worried About Running Out of Food in the Last Year: Never true     Ran Out of Food in the Last Year: Never true   Transportation Needs: No Transportation Needs (1/16/2024)    PRAPARE - Transportation     Lack of Transportation (Medical): No     Lack of Transportation (Non-Medical): No   Utilities: Not At Risk (1/16/2024)    [facility] Utilities     Threatened with loss of utilities: No       DISCHARGE DETAILS:    Discharge planning discussed with:: patient  Freedom of Choice: Yes  Comments - Freedom of Choice: CM met with patient at bedside re: consult received for dispo planning. Patient lives with spouse and parents in two story home, 2 HOMERO, second floor bed/bath (half bath on first floor). Patient independent with ADLS (spouse assists as needed), does not use any dme to ambulate, has hx of OPPT, and drives self to appointments. Confirmed PCP (Arnie) and preferred pharmacy ([facility]). Patient relayed her car is currently in ED parking lot and will drive self home or call her  if she is not feeling well enough to drive when ready for d/c. No CM d/c needs identified at this time, CM remains available.                                         Treatment Team Recommendation: Home  Discharge Destination Plan:: Home  Transport at Discharge : Self                          IMM reviewed with patient, patient agrees with discharge determination.  IMM Given (Date):: 01/16/24  IMM Given to:: Patient

## 2024-01-16 NOTE — ASSESSMENT & PLAN NOTE
Has history of lupus.   Patient is following with endocrinologist at [facility].   On weekly methotrexate injections and hydroxychlorquine 200mg BID    Plan:  Continue home hydroxychlorquine, Methotrexate, and folic acid  Follow up with endocrinology and PCP

## 2024-01-17 ENCOUNTER — TELEPHONE (OUTPATIENT)
Dept: FAMILY MEDICINE CLINIC | Facility: CLINIC | Age: 29
End: 2024-01-17

## 2024-01-17 ENCOUNTER — TRANSITIONAL CARE MANAGEMENT (OUTPATIENT)
Dept: FAMILY MEDICINE CLINIC | Facility: CLINIC | Age: 29
End: 2024-01-17

## 2024-01-20 LAB
BACTERIA BLD CULT: NORMAL
BACTERIA BLD CULT: NORMAL

## 2024-01-22 ENCOUNTER — TELEPHONE (OUTPATIENT)
Dept: NEUROLOGY | Facility: CLINIC | Age: 29
End: 2024-01-22

## 2024-01-22 NOTE — TELEPHONE ENCOUNTER
Spoke w/pt. She reports ongoing symptoms of:     Heaviness in left arm  Increased numbness in left foot  Occassional shocks tab left arm from neck  Speech is off occasion although this has greatly improved.  No visual changes    Pt reports no other symptoms.    LOV - 10/4/23 -TIA    10/5/23 MRA head - negative  10/5/23 MRI b - MRI was personally reviewed.  No significant pathology was found and will be reviewed with the patient at the follow up visit    F/U appt 2/14/24 w/Dr. LORENZO     Pt will be out-of-town 1/28/24 - 2/11/24      Dr. VO - pt wanted to know if 2/14/24 appt is okay or if she should be seen sooner.

## 2024-01-22 NOTE — PATIENT COMMUNICATION
Spoke w/pt. She advised they were able to complete disability ppwk with OV notes.   Nothing further needed at this time.   Task complete.

## 2024-01-23 ENCOUNTER — TELEPHONE (OUTPATIENT)
Dept: NEUROLOGY | Facility: CLINIC | Age: 29
End: 2024-01-23

## 2024-01-23 NOTE — TELEPHONE ENCOUNTER
I reached out to patient, in regards to scheduling an appt. Before 1/28/24.    LVM    If patient calls back please offer OVL any time before 1/28/24.     Thank you

## 2024-01-23 NOTE — TELEPHONE ENCOUNTER
Alma Espitia MD   to Me  Estefania Sullivan       1/22/24  4:56 PM  Please offer 60 min OVL visit with me prior to 1/28 to address all the patient questions and concerns      ___________________________________    Scheduling - please reach out to patient to schedule f/u appt per Dr. VO's note above. Thank you!!

## 2024-01-24 ENCOUNTER — TELEPHONE (OUTPATIENT)
Dept: PSYCHIATRY | Facility: CLINIC | Age: 29
End: 2024-01-24

## 2024-01-24 NOTE — TELEPHONE ENCOUNTER
Patient is calling regarding cancelling an appointment.    Date/Time: 1/25/202411am    Reason:     Patient was rescheduled: YES [] NO [x]  If yes, when was Patient reschedule for:     Patient requesting call back to reschedule: YES [] NO [x]

## 2024-01-25 NOTE — TELEPHONE ENCOUNTER
January 25, 2024  Me   to Jenny Rodrigues CB      1/25/24 10:20 AM  Thank you for contacting [facility],     Austin Alvarado,      We have been trying to get in touch with you to schedule an appointment with Dr. Espitia prior to you leaving on 1/28/24.      Please call the office at [phone] Option 1 to reach the Scheduling Department.      Thank you!     Marie TONG

## 2024-01-25 NOTE — TELEPHONE ENCOUNTER
January 25, 2024  Cecy THOMAS    1/25/24 11:05 AM  Note     ADD ON  1/26 @ 1:30 OVL with Dr Espitia in [place].

## 2024-01-26 ENCOUNTER — OFFICE VISIT (OUTPATIENT)
Dept: NEUROLOGY | Facility: CLINIC | Age: 29
End: 2024-01-26
Payer: COMMERCIAL

## 2024-01-26 VITALS
TEMPERATURE: 97.7 F | DIASTOLIC BLOOD PRESSURE: 68 MMHG | SYSTOLIC BLOOD PRESSURE: 110 MMHG | OXYGEN SATURATION: 98 % | HEIGHT: 61 IN | HEART RATE: 92 BPM | WEIGHT: 128.2 LBS | BODY MASS INDEX: 24.2 KG/M2

## 2024-01-26 DIAGNOSIS — G81.94 LEFT HEMIPARESIS (HCC): Primary | ICD-10-CM

## 2024-01-26 DIAGNOSIS — R41.840 COGNITIVE ATTENTION DEFICIT: ICD-10-CM

## 2024-01-26 DIAGNOSIS — F43.12 CHRONIC POST-TRAUMATIC STRESS DISORDER (PTSD): ICD-10-CM

## 2024-01-26 DIAGNOSIS — F31.62 BIPOLAR DISORDER, CURRENT EPISODE MIXED, MODERATE (HCC): ICD-10-CM

## 2024-01-26 DIAGNOSIS — R45.1 AGITATION: ICD-10-CM

## 2024-01-26 DIAGNOSIS — G43.809 VESTIBULAR MIGRAINE: ICD-10-CM

## 2024-01-26 DIAGNOSIS — E10.8 CONTROLLED DIABETES MELLITUS TYPE 1 WITH COMPLICATIONS (HCC): ICD-10-CM

## 2024-01-26 PROCEDURE — 3074F SYST BP LT 130 MM HG: CPT | Performed by: PSYCHIATRY & NEUROLOGY

## 2024-01-26 PROCEDURE — 99215 OFFICE O/P EST HI 40 MIN: CPT | Performed by: PSYCHIATRY & NEUROLOGY

## 2024-01-26 PROCEDURE — 3078F DIAST BP <80 MM HG: CPT | Performed by: PSYCHIATRY & NEUROLOGY

## 2024-01-26 RX ORDER — DIVALPROEX SODIUM 250 MG/1
TABLET, EXTENDED RELEASE ORAL
Qty: 8 TABLET | Refills: 1 | Status: SHIPPED | OUTPATIENT
Start: 2024-01-26

## 2024-01-26 RX ORDER — LEVONORGESTREL AND ETHINYL ESTRADIOL 0.15-0.03
1 KIT ORAL DAILY
COMMUNITY

## 2024-01-26 RX ORDER — KETOROLAC TROMETHAMINE 10 MG/1
10 TABLET, FILM COATED ORAL EVERY 6 HOURS PRN
Qty: 10 TABLET | Refills: 2 | Status: SHIPPED | OUTPATIENT
Start: 2024-01-26

## 2024-01-26 NOTE — PROGRESS NOTES
Patient ID: Jenny Rodrigues is a 28 y.o. female.    Assessment/Plan:           Problem List Items Addressed This Visit          Endocrine    Controlled diabetes mellitus type 1 with complications (HCC)       Nervous and Auditory    Left hemiparesis (HCC)    Relevant Medications    divalproex sodium (Depakote ER) 250 mg 24 hr tablet       Other    Agitation - Primary    Relevant Orders    Ambulatory referral to Neuropsychology    Chronic post-traumatic stress disorder (PTSD)    Relevant Orders    Ambulatory referral to Neuropsychology    Bipolar affective disorder (HCC)    Relevant Orders    Ambulatory referral to Neuropsychology     Other Visit Diagnoses       Cognitive attention deficit        Relevant Orders    Ambulatory referral to Neuropsychology    Vestibular migraine        Relevant Medications    ketorolac (TORADOL) 10 mg tablet    divalproex sodium (Depakote ER) 250 mg 24 hr tablet    Cyanocobalamin 1000 MCG SUBL           Mrs. Rodrigues has presented to [facility] for follow-up on multiple neurological complaints.  Patient had completed MRI brain and MRA head in October 2023 for acutely developed left-sided sensorimotor dysfunction with normal MRI being described at that time, patient continued describing residual left lower extremity sensory loss in the setting of known history of undifferentiated connective tissue disorder while using methotrexate and Plaquenil and recently belimumab.   As we agreed with the patient we had completed imaging of the brain cervical and thoracic spine in 2021 were evaluation was required to rule out CNS demyelination with concern for entire rheumatological manifestation of any other autoimmune condition with central nervous system involvement.  Findings were unremarkable.    Patient completed EMG/NCS study in December 2020 with normal results been done as majority of rheumatological condition present with small fiber neuropathy and normal EMGs.    At  this point, I would agree patient would benefit to reach better control of her type 1 diabetes considering she had a recent hospitalization for DKA despite full medical recommendations.  We also agreed that patient headache is likely due to hyperglycemia with ketoacidosis.  Patient was offered Depakote taper with ketorolac 10 mg as well as B12 1000 mcg sublingual supplements, she may benefit from Migravent preventive regimen for at least 60 days as she is traveling to [place] this weekend with her  and his parents right after her wedding.  Patient describes having vertigo with temporo-occipital headache with concern for vestibular migraine was brought to during this visit.  Will try to avoid Decadron in the light of uncontrolled diabetes.  Patient is to continue ANGEL 600 mg as was suggested by rheumatology team for joint pain.  No focal neurological deficit noted on today's exam, patient will not require any additional starting today.    Patient continued describing worsening forgetfulness and cognitive dysfunction.  Patient will be advised to proceed with formal neuropsychological relation when she is back from her [facility].      Subjective: Headache, lower extremity sensory dysfunction, vertigo.    HPI  Mrs. Rodrigues has presented to [facility] for follow-up on sensory dysfunction involving left upper left lower extremity in the setting of underlying rheumatological condition.  Patient has been taking multiple immunosuppressive regimens with recent adjustment of gabapentin advised up to 600 mg daily.  Patient continue reporting numbness has been progressively worse and involves her left upper extremity and left lower extremity when she is in supine position with right lower extremity numbness reported in the past.  Patient stated that she stands up and ambulates her sensory dysfunction has been subsiding.  Patient was clear her symptoms started in October 2023.  At that time patient had completed  brain MRI and MRA to rule out stroke with negative results been described.  Patient stated she tried to keep her legs uncovered because she believes she has numbness as a result of lung infection in her skin.    Patient describes dizziness but she is not moving or falling.    Patient completed brain MRI in addition to cervical thoracic spine MRI in 2021 with EMG study done in December 2020.  Patient does not report any muscle stiffness.  Patient reports no loss of consciousness.  Patient describes right temporal occipital headache with intermittent vertigo at least 3 times a week upon awakening.  Excedrin provides excellent benefits.          The following portions of the patient's history were reviewed and updated as appropriate: She  has a past medical history of Abdominal pain, Anaphylaxis, Anxiety, Anxiety and depression, COVID-19 (12/2020), Delayed emergence from anesthesia (03/23/2023), Delirium, induced by drug, Depression, Diabetes mellitus ([facility]), Disease of thyroid gland, DKA, type 1 ([facility]) (05/11/2021), Eating disorder, Food intolerance, Fracture of fibula, Gilbert disease, Hashimoto's disease (02/21/2020), Head injury, Headache(784.0), Nasal congestion, PTSD (post-traumatic stress disorder), Rectal bleed, Seizures ([facility]), and Type 1 diabetes ([facility]).  She   Patient Active Problem List    Diagnosis Date Noted    Left hemiparesis ([facility]) 01/26/2024    Lupus ([facility]) 01/15/2024    Pre-conception counseling 10/06/2023    Other forms of systemic lupus erythematosus ([facility]) 09/15/2023    Idiopathic hypotension 09/07/2023    Pigmentation abnormality of skin 06/20/2023    Hearing loss of right ear 06/08/2023    Undifferentiated connective tissue disease ([facility]) 06/08/2023    Delayed emergence from anesthesia 03/23/2023    Controlled diabetes mellitus type 1 with complications ([facility]) 03/22/2023    Bruising 10/19/2022    Constipation 09/28/2022    Gastroesophageal reflux disease 09/26/2022    Other chest pain 02/22/2022     Physical deconditioning 12/21/2021    Right-sided back pain 12/21/2021    Verruca 09/23/2021    Nail abnormality 09/01/2021    Primary hypothyroidism 08/18/2021    Type 1 diabetes mellitus (HCC) 07/17/2021    Type 1 diabetes mellitus with hyperglycemia (HCC) 07/17/2021    Uncontrolled type 1 diabetes mellitus with hyperglycemia (HCC) 07/17/2021    Elevated prolactin level 07/14/2021    Fatty stools 06/29/2021    Left low back pain 06/29/2021    Hyperlipidemia 05/19/2021    Visual hallucinations 05/11/2021    Diabetic ketoacidosis associated with type 1 diabetes mellitus (HCC) 05/11/2021    Rheumatoid factor positive 04/29/2021    History of anesthesia complications 03/22/2021    Seizures ([facility])     Positive SOHAM (antinuclear antibody) 03/09/2021    Weakness generalized 02/11/2021    Low serum vitamin B12 02/11/2021    Arthralgia 02/11/2021    Bipolar affective disorder (HCC) 01/13/2021    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    Yeast infection 12/01/2020    Syncope     Chronic back pain 10/16/2020    Insulin pump status 09/30/2020    Muscle weakness 09/03/2020    Word finding difficulty 09/03/2020    Neuropathy 06/23/2020    Polyarthritis 06/23/2020    Secondary amenorrhea 06/23/2020    Vitamin D deficiency 06/19/2020    Paresthesia of left leg 04/21/2020    Hashimoto's thyroiditis 02/21/2020    Dysuria 02/21/2020    Visual changes 01/21/2020    Chronic pain of right knee 10/15/2019    Chronic abdominal pain 10/15/2019    Blood in the stool 10/15/2019    OCD (obsessive compulsive disorder) 09/26/2019    Activity intolerance 09/10/2019    Chronic post-traumatic stress disorder (PTSD) 08/12/2019    Generalized anxiety disorder with panic attacks 08/12/2019    Nausea and vomiting 08/06/2019    Agitation 08/06/2019    Concussion without loss of consciousness 07/02/2019    Urinary frequency 06/06/2019    Abnormal stools 02/07/2019    Uncontrolled type 1 diabetes mellitus with hypoglycemia without coma (HCC) 01/29/2019    Abnormal liver  "function test 12/13/2018    Right sided abdominal pain 12/13/2018    Chronic fatigue and malaise 12/13/2018    Patellofemoral pain syndrome of right knee 09/26/2018     She  has a past surgical history that includes Wrist surgery; [place] tooth extraction; Nasal septoplasty w/ turbinoplasty; Knee surgery (Right, 07/06/2020); Sinus surgery; Turbinoplasty (N/A, 3/22/2021); Colonoscopy; Upper gastrointestinal endoscopy; and Skin biopsy.  Her family history includes Alcohol abuse in her brother and father; Anxiety disorder in her mother; Arthritis in her mother; Cancer in her family; Cholelithiasis in her father; Cirrhosis in her father; Coronary artery disease in her father; Depression in her mother; Diabetes in her family, father, and mother; Diabetes type II in her mother; Eczema in her father; Hashimoto's thyroiditis in her sister; Hearing loss in her mother; Hyperlipidemia in her father and mother; Hypertension in her family, father, and mother; Migraines in her mother; Nephrolithiasis in her father; Sarcoidosis in her father; Thyroid disease in her sister; Varicose Veins in her mother.  She  reports that she has never smoked. She has never been exposed to tobacco smoke. She has never used smokeless tobacco. She reports current alcohol use. She reports that she does not use drugs.  Current Outpatient Medications   Medication Sig Dispense Refill    BD Insulin Syringe U/F 31G X 5/16\" 0.5 ML MISC Use as directly with weekly methotrexate injection. 100 each 0    Belimumab (BENLYSTA IV) Inject into a catheter in a vein every 14 (fourteen) days Switching to monthly on 12/5      clindamycin (CLEOCIN T) 1 % lotion Apply 1 application. topically 2 (two) times a day      Cyanocobalamin 1000 MCG SUBL Place 1 tablet (1,000 mcg total) under the tongue daily 90 tablet 0    divalproex sodium (Depakote ER) 250 mg 24 hr tablet Take 2 tabs for 3 days, then 1 tab for 2 days 8 tablet 1    folic acid (FOLVITE) 1 mg tablet Take 1 tablet " (1 mg total) by mouth daily 90 tablet 3    gabapentin (Neurontin) 300 mg capsule Take 1 capsule (300 mg total) by mouth daily at bedtime PRN 90 capsule 3    glucagon 1 MG injection 1 mg      Glucagon HCl (Glucagon Emergency) 1 MG/ML SOLR INJECT 1 MG AS DIRECTED AS NEEDED (WHEN PASSED OUT FROM LOW BLOOD SUGAR).      hydroxychloroquine (PLAQUENIL) 200 mg tablet Take 1 tablet (200 mg total) by mouth 2 (two) times a day with meals 180 tablet 3    Insulin Disposable Pump (Omnipod 5 G6 Pod, Gen 5,) MISC INJECT 1 EACH UNDER THE SKIN EVERY THIRD DAY. E10.65      insulin lispro (HumaLOG) 100 units/mL injection Inject 15 Units under the skin 3 (three) times a day before meals 10 mL 0    Insulin Pen Needle (BD PEN NEEDLE NASIM U/F) 32G X 4 MM MISC by Does not apply route 4 (four) times a day for 180 days 400 each 3    ketorolac (TORADOL) 10 mg tablet Take 1 tablet (10 mg total) by mouth every 6 (six) hours as needed for moderate pain 10 tablet 2    levonorgestrel-ethinyl estradiol (NORDETTE) 0.15-30 MG-MCG per tablet Take 1 tablet by mouth daily      levothyroxine 25 mcg tablet Take 1 tablet (25 mcg total) by mouth daily 60 tablet 0    LORazepam (Ativan) 0.5 mg tablet Take 1 tablet (0.5 mg total) by mouth 2 (two) times a day as needed for anxiety 60 tablet 0    methotrexate 50 MG/2ML injection Inject 0.8 mL (20 mg total) under the skin once a week 8 mL 4    miconazole 2 % cream Apply 1 Application topically 2 (two) times a day To affected area      OneTouch Verio test strip USE 4 TIMES A DAY BEFORE MEALS AND AT BEDTIME      Tresiba FlexTouch 100 units/mL injection pen Inject 30 Units under the skin daily 15 mL 0    ketoconazole (NIZORAL) 2 % shampoo Apply topically once (Patient not taking: Reported on 1/26/2024)      tretinoin (REFISSA) 0.05 % cream APPLY TO AFFECTED AREAS NIGHTLY IF IRRITATION OCCURS THEN APPLY MOISTURIZER FIRST (Patient not taking: Reported on 1/26/2024)      tretinoin (RETIN-A) 0.025 % cream Apply topically  "daily at bedtime (Patient not taking: Reported on 1/26/2024)       No current facility-administered medications for this visit.     Current Outpatient Medications on File Prior to Visit   Medication Sig    BD Insulin Syringe U/F 31G X 5/16\" 0.5 ML MISC Use as directly with weekly methotrexate injection.    Belimumab (BENLYSTA IV) Inject into a catheter in a vein every 14 (fourteen) days Switching to monthly on 12/5    clindamycin (CLEOCIN T) 1 % lotion Apply 1 application. topically 2 (two) times a day    folic acid (FOLVITE) 1 mg tablet Take 1 tablet (1 mg total) by mouth daily    gabapentin (Neurontin) 300 mg capsule Take 1 capsule (300 mg total) by mouth daily at bedtime PRN    glucagon 1 MG injection 1 mg    Glucagon HCl (Glucagon Emergency) 1 MG/ML SOLR INJECT 1 MG AS DIRECTED AS NEEDED (WHEN PASSED OUT FROM LOW BLOOD SUGAR).    hydroxychloroquine (PLAQUENIL) 200 mg tablet Take 1 tablet (200 mg total) by mouth 2 (two) times a day with meals    Insulin Disposable Pump (Omnipod 5 G6 Pod, Gen 5,) MISC INJECT 1 EACH UNDER THE SKIN EVERY THIRD DAY. E10.65    insulin lispro (HumaLOG) 100 units/mL injection Inject 15 Units under the skin 3 (three) times a day before meals    Insulin Pen Needle (BD PEN NEEDLE NASIM U/F) 32G X 4 MM MISC by Does not apply route 4 (four) times a day for 180 days    levonorgestrel-ethinyl estradiol (NORDETTE) 0.15-30 MG-MCG per tablet Take 1 tablet by mouth daily    levothyroxine 25 mcg tablet Take 1 tablet (25 mcg total) by mouth daily    LORazepam (Ativan) 0.5 mg tablet Take 1 tablet (0.5 mg total) by mouth 2 (two) times a day as needed for anxiety    methotrexate 50 MG/2ML injection Inject 0.8 mL (20 mg total) under the skin once a week    miconazole 2 % cream Apply 1 Application topically 2 (two) times a day To affected area    OneTouch Verio test strip USE 4 TIMES A DAY BEFORE MEALS AND AT BEDTIME    Tresiba FlexTouch 100 units/mL injection pen Inject 30 Units under the skin daily    " "ketoconazole (NIZORAL) 2 % shampoo Apply topically once (Patient not taking: Reported on 1/26/2024)    tretinoin (REFISSA) 0.05 % cream APPLY TO AFFECTED AREAS NIGHTLY IF IRRITATION OCCURS THEN APPLY MOISTURIZER FIRST (Patient not taking: Reported on 1/26/2024)    tretinoin (RETIN-A) 0.025 % cream Apply topically daily at bedtime (Patient not taking: Reported on 1/26/2024)     No current facility-administered medications on file prior to visit.     She is allergic to insulin glargine..         Objective:    Blood pressure 110/68, pulse 92, temperature 97.7 °F (36.5 °C), temperature source Temporal, height 5' 1\" (1.549 m), weight 58.2 kg (128 lb 3.2 oz), SpO2 98%, not currently breastfeeding.    Physical Exam    Neurological Exam  CONSTITUTIONAL: NAD, pleasant. NECK: supple, no lymphadenopathy, no thyromegaly, no JVD. CARDIOVASCULAR: RRR, normal S1S2, no murmurs, no rubs. RESP: clear to auscultation bilaterally, no wheezes/rhonchi/rales. ABDOMEN: soft, non tender, non distended. SKIN: no rash or skin lesions. EXTREMITIES: no edema, pulses 2+bilaterally. PSYCH: appropriate mood and affect  NEUROLOGIC COMPREHENSIVE EXAM: Patient is oriented to person, place and time, NAD; appropriate affect. CN II, III, IV, V, VI, VII,VIII,IX,X,XI-XII intact with EOMI, PERRLA, OKN intact, VF grossly intact, fundi poorly visualized secondary to pupillary constriction; symmetric face noted. Motor: 5/5 UE/LE bilateral symmetric; Sensory: intact to light touch and pinprick bilaterally; normal vibration sensation feet bilaterally; Coordination within normal limits on FTN and [place] testing; DTR: 2/4 through, no Babinski, no clonus. Tandem gait is intact. Romberg: absent.    ROS:    Review of Systems   Constitutional:  Negative for appetite change, fatigue and fever.   HENT: Negative.  Negative for hearing loss, tinnitus, trouble swallowing and voice change.    Eyes: Negative.  Negative for photophobia, pain and visual disturbance. "   Respiratory: Negative.  Negative for shortness of breath.    Cardiovascular: Negative.  Negative for palpitations.   Gastrointestinal: Negative.  Negative for nausea and vomiting.   Endocrine: Negative.  Negative for cold intolerance.   Genitourinary: Negative.  Negative for dysuria, frequency and urgency.   Musculoskeletal:  Positive for neck pain and neck stiffness. Negative for back pain, gait problem and myalgias.   Skin: Negative.  Negative for rash.   Allergic/Immunologic: Negative.    Neurological:  Positive for numbness (knee down on the L side). Negative for dizziness, tremors, seizures, syncope, facial asymmetry, speech difficulty, weakness, light-headedness and headaches.   Hematological: Negative.  Does not bruise/bleed easily.   Psychiatric/Behavioral:  Positive for confusion (memory loss). Negative for hallucinations and sleep disturbance.    All other systems reviewed and are negative.

## 2024-01-28 PROBLEM — R41.840 COGNITIVE ATTENTION DEFICIT: Status: ACTIVE | Noted: 2024-01-28

## 2024-01-28 PROBLEM — G43.809 VESTIBULAR MIGRAINE: Status: ACTIVE | Noted: 2024-01-28

## 2024-02-08 DIAGNOSIS — M32.8 OTHER FORMS OF SYSTEMIC LUPUS ERYTHEMATOSUS, UNSPECIFIED ORGAN INVOLVEMENT STATUS (HCC): Primary | ICD-10-CM

## 2024-02-08 RX ORDER — SODIUM CHLORIDE 9 MG/ML
20 INJECTION, SOLUTION INTRAVENOUS ONCE
OUTPATIENT
Start: 2024-02-12

## 2024-02-08 RX ORDER — ACETAMINOPHEN 325 MG/1
650 TABLET ORAL ONCE
OUTPATIENT
Start: 2024-02-12

## 2024-02-08 RX ORDER — DIPHENHYDRAMINE HCL 25 MG
25 TABLET ORAL ONCE
OUTPATIENT
Start: 2024-02-12

## 2024-02-11 ENCOUNTER — APPOINTMENT (OUTPATIENT)
Dept: RADIOLOGY | Age: 29
End: 2024-02-11
Payer: COMMERCIAL

## 2024-02-11 ENCOUNTER — OFFICE VISIT (OUTPATIENT)
Dept: URGENT CARE | Age: 29
End: 2024-02-11
Payer: COMMERCIAL

## 2024-02-11 VITALS
OXYGEN SATURATION: 100 % | TEMPERATURE: 98.4 F | HEART RATE: 99 BPM | DIASTOLIC BLOOD PRESSURE: 83 MMHG | SYSTOLIC BLOOD PRESSURE: 123 MMHG | RESPIRATION RATE: 18 BRPM

## 2024-02-11 DIAGNOSIS — H66.001 NON-RECURRENT ACUTE SUPPURATIVE OTITIS MEDIA OF RIGHT EAR WITHOUT SPONTANEOUS RUPTURE OF TYMPANIC MEMBRANE: ICD-10-CM

## 2024-02-11 DIAGNOSIS — R05.1 ACUTE COUGH: ICD-10-CM

## 2024-02-11 DIAGNOSIS — J01.00 ACUTE NON-RECURRENT MAXILLARY SINUSITIS: Primary | ICD-10-CM

## 2024-02-11 PROCEDURE — 99213 OFFICE O/P EST LOW 20 MIN: CPT | Performed by: STUDENT IN AN ORGANIZED HEALTH CARE EDUCATION/TRAINING PROGRAM

## 2024-02-11 PROCEDURE — 71046 X-RAY EXAM CHEST 2 VIEWS: CPT

## 2024-02-11 NOTE — PROGRESS NOTES
[facility]        NAME: Jenny Rodrigues is a 29 y.o. female  : 1995    MRN: [id number]  DATE: 2024  TIME: 10:37 AM    Assessment and Plan   Acute non-recurrent maxillary sinusitis [J01.00]  1. Acute non-recurrent maxillary sinusitis  doxycycline (VIBRAMYCIN) 50 MG/5ML SYRP      2. Acute cough  XR chest pa & lateral      3. Non-recurrent acute suppurative otitis media of right ear without spontaneous rupture of tympanic membrane        Chest xray wnl.  Consistent with bacterial maxillary sinusitis and R AOM.  Requesting liquid abx as her throat is sore and she prefers not to take pills. Will call back if she has issue s with Rx.      Patient Instructions   Please start antibiotic as prescribed.  Please call your PCP office on Monday to make a recheck appointment.  You should start to feel better within the next 48 hours, if you are not feeling better or feeling worse, please be sure to get checked.  Please keep your endocrinologist updated on your blood sugars and keep your appointment this week.  I also recommend using nasal saline spray to try and get as much mucus out as you can reduce the mucus that is going down your throat as this is worsening your sore throat and cough.    If you develop any severe symptoms such as shortness of breath, please go to the ER.    Follow up with PCP in 3-5 days.  Proceed to  ER if symptoms worsen.    Chief Complaint     Chief Complaint   Patient presents with    Cough    Fever    Earache    Generalized Body Aches    Headache    Cold Like Symptoms     Symptoms started on  and are progressively getting worse. Patient has tried numerous OTC medications without relief.          History of Present Illness       Patient presents for worsening symptoms including nasal congestion, sinus pressure, postnasal drip, cough, fevers, left ear pain, feeling short of breath with coughing.  She was recently on a [place] cruise, symptoms started around .  There  "is minimal medical access on the cruise.  Through 2/5 she started feeling worse, having more cough in her throat feeling more congested and swollen.  She reports that she is bringing up green mucus, having a lot of sinus pressure, now having right ear pain, bringing up green mucus.  Having sweats overnight.  She notes that since she got sick, her blood sugars have been more elevated.  She has been getting some readings of \"high\", but it does come down with her insulin administration.  No nausea, vomiting, urinary symptoms.  She does have endocrinology follow-up scheduled for this week.  She is on immune suppressive medications, notes frequent illnesses.  Has been advised against taking amoxicillin as she is on methotrexate.        Review of Systems   Review of Systems   All other systems reviewed and are negative.        Current Medications       Current Outpatient Medications:     BD Insulin Syringe U/F 31G X 5/16\" 0.5 ML MISC, Use as directly with weekly methotrexate injection., Disp: 100 each, Rfl: 0    Belimumab (BENLYSTA IV), Inject into a catheter in a vein every 14 (fourteen) days Switching to monthly on 12/5, Disp: , Rfl:     clindamycin (CLEOCIN T) 1 % lotion, Apply 1 application. topically 2 (two) times a day, Disp: , Rfl:     Cyanocobalamin 1000 MCG SUBL, Place 1 tablet (1,000 mcg total) under the tongue daily, Disp: 90 tablet, Rfl: 0    divalproex sodium (Depakote ER) 250 mg 24 hr tablet, Take 2 tabs for 3 days, then 1 tab for 2 days, Disp: 8 tablet, Rfl: 1    doxycycline (VIBRAMYCIN) 50 MG/5ML SYRP, Take 10 mL (100 mg total) by mouth 2 (two) times a day for 7 days, Disp: 140 mL, Rfl: 0    folic acid (FOLVITE) 1 mg tablet, Take 1 tablet (1 mg total) by mouth daily, Disp: 90 tablet, Rfl: 3    gabapentin (Neurontin) 300 mg capsule, Take 1 capsule (300 mg total) by mouth daily at bedtime PRN, Disp: 90 capsule, Rfl: 3    glucagon 1 MG injection, 1 mg, Disp: , Rfl:     Glucagon HCl (Glucagon Emergency) 1 " MG/ML SOLR, INJECT 1 MG AS DIRECTED AS NEEDED (WHEN PASSED OUT FROM LOW BLOOD SUGAR)., Disp: , Rfl:     hydroxychloroquine (PLAQUENIL) 200 mg tablet, Take 1 tablet (200 mg total) by mouth 2 (two) times a day with meals, Disp: 180 tablet, Rfl: 3    Insulin Disposable Pump (Omnipod 5 G6 Pod, Gen 5,) MISC, INJECT 1 EACH UNDER THE SKIN EVERY THIRD DAY. E10.65, Disp: , Rfl:     insulin lispro (HumaLOG) 100 units/mL injection, Inject 15 Units under the skin 3 (three) times a day before meals, Disp: 10 mL, Rfl: 0    ketorolac (TORADOL) 10 mg tablet, Take 1 tablet (10 mg total) by mouth every 6 (six) hours as needed for moderate pain, Disp: 10 tablet, Rfl: 2    levonorgestrel-ethinyl estradiol (NORDETTE) 0.15-30 MG-MCG per tablet, Take 1 tablet by mouth daily, Disp: , Rfl:     levothyroxine 25 mcg tablet, Take 1 tablet (25 mcg total) by mouth daily, Disp: 60 tablet, Rfl: 0    LORazepam (Ativan) 0.5 mg tablet, Take 1 tablet (0.5 mg total) by mouth 2 (two) times a day as needed for anxiety, Disp: 60 tablet, Rfl: 0    methotrexate 50 MG/2ML injection, Inject 0.8 mL (20 mg total) under the skin once a week, Disp: 8 mL, Rfl: 4    miconazole 2 % cream, Apply 1 Application topically 2 (two) times a day To affected area, Disp: , Rfl:     OneTouch Verio test strip, USE 4 TIMES A DAY BEFORE MEALS AND AT BEDTIME, Disp: , Rfl:     Tresiba FlexTouch 100 units/mL injection pen, Inject 30 Units under the skin daily, Disp: 15 mL, Rfl: 0    tretinoin (REFISSA) 0.05 % cream, , Disp: , Rfl:     tretinoin (RETIN-A) 0.025 % cream, Apply topically daily at bedtime, Disp: , Rfl:     amoxicillin-clavulanate (AUGMENTIN) 400-57 mg/5 mL suspension, Take 10 mL by mouth 2 (two) times a day for 7 days, Disp: 140 mL, Rfl: 0    Insulin Pen Needle (BD PEN NEEDLE NASIM U/F) 32G X 4 MM MISC, by Does not apply route 4 (four) times a day for 180 days, Disp: 400 each, Rfl: 3    ketoconazole (NIZORAL) 2 % shampoo, Apply topically once (Patient not taking:  Reported on 1/26/2024), Disp: , Rfl:     Current Allergies     Allergies as of 02/11/2024 - Reviewed 02/11/2024   Allergen Reaction Noted    Insulin glargine Other (See Comments) 07/02/2019            The following portions of the patient's history were reviewed and updated as appropriate: allergies, current medications, past family history, past medical history, past social history, past surgical history and problem list.     Past Medical History:   Diagnosis Date    Abdominal pain     Anaphylaxis     Anxiety     Anxiety and depression     COVID-19 12/2020    Delayed emergence from anesthesia 03/23/2023    Severe episode of emergence delirium (confused, combative) after MAC anesthetic on 03/2023 for EGD. Received versed 2mg, >50mcg precedex, 5mg IV haldol, and 50mcg fentanyl. Waxing and waning episodes of agitation. Any future anesthetics should NOT be done at [facility].    Delirium, induced by drug     anesthesia    Depression     Diabetes mellitus (HCC)     Disease of thyroid gland     Hashimoto    DKA, type 1 (HCC) 05/11/2021    Eating disorder     history of anorexia/bulemia [id number]    Food intolerance     Fracture of fibula     R Salter I    Gilbert disease     Hashimoto's disease 02/21/2020    Head injury     Headache(784.0)     Nasal congestion     PTSD (post-traumatic stress disorder)     Rectal bleed     Seizures (HCC)     Type 1 diabetes (HCC)        Past Surgical History:   Procedure Laterality Date    COLONOSCOPY      KNEE SURGERY Right 07/06/2020    NASAL SEPTOPLASTY W/ TURBINOPLASTY      SINUS SURGERY      SKIN BIOPSY      TURBINOPLASTY N/A 3/22/2021    Procedure: TURBINOPLASTY;  Surgeon: Jn Hidalgo MD;  Location: [facility];  Service: ENT    UPPER GASTROINTESTINAL ENDOSCOPY      WISDOM TOOTH EXTRACTION      WRIST SURGERY      left; Excision of ganglion       Family History   Problem Relation Age of Onset    Hypertension Mother     Migraines Mother         Headache    Diabetes type II Mother      Varicose Veins Mother     Hyperlipidemia Mother     Diabetes Mother     Arthritis Mother     Depression Mother     Hearing loss Mother     Anxiety disorder Mother     Eczema Father     Cholelithiasis Father     Hypertension Father     Sarcoidosis Father         Liver    Hyperlipidemia Father     Diabetes Father     Coronary artery disease Father     Nephrolithiasis Father     Cirrhosis Father     Alcohol abuse Father     Thyroid disease Sister     Hashimoto's thyroiditis Sister     Alcohol abuse Brother     Cancer Family     Diabetes Family     Hypertension Family          Medications have been verified.        Objective   /83   Pulse 99   Temp 98.4 °F (36.9 °C)   Resp 18   SpO2 100%   No LMP recorded.       Physical Exam     Physical Exam  Vitals and nursing note reviewed.   Constitutional:       General: She is not in acute distress.     Appearance: Normal appearance. She is ill-appearing. She is not toxic-appearing.   HENT:      Head: Normocephalic and atraumatic.      Comments: Maxillary sinuses tender to palpation     Right Ear: External ear normal. Tympanic membrane is erythematous and bulging.      Left Ear: Tympanic membrane, ear canal and external ear normal.      Nose: Congestion and rhinorrhea present.      Mouth/Throat:      Mouth: Mucous membranes are moist.      Pharynx: Posterior oropharyngeal erythema present.      Comments: Cobblestoning and PND  Eyes:      Extraocular Movements: Extraocular movements intact.   Cardiovascular:      Rate and Rhythm: Normal rate and regular rhythm.      Heart sounds: Normal heart sounds.   Pulmonary:      Effort: Pulmonary effort is normal. No respiratory distress.      Breath sounds: Normal breath sounds. No stridor. No wheezing, rhonchi or rales.   Skin:     General: Skin is warm and dry.      Capillary Refill: Capillary refill takes less than 2 seconds.      Findings: No rash.   Neurological:      Mental Status: She is alert.      Gait: Gait normal.    Psychiatric:         Behavior: Behavior normal.

## 2024-02-11 NOTE — PATIENT INSTRUCTIONS
Please start antibiotic as prescribed.  Please call your PCP office on Monday to make a recheck appointment.  You should start to feel better within the next 48 hours, if you are not feeling better or feeling worse, please be sure to get checked.  Please keep your endocrinologist updated on your blood sugars and keep your appointment this week.  I also recommend using nasal saline spray to try and get as much mucus out as you can reduce the mucus that is going down your throat as this is worsening your sore throat and cough.    If you develop any severe symptoms such as shortness of breath, please go to the ER.

## 2024-02-12 ENCOUNTER — HOSPITAL ENCOUNTER (OUTPATIENT)
Dept: INFUSION CENTER | Facility: CLINIC | Age: 29
Discharge: HOME | End: 2024-02-12

## 2024-02-12 DIAGNOSIS — J01.00 ACUTE MAXILLARY SINUSITIS, RECURRENCE NOT SPECIFIED: Primary | ICD-10-CM

## 2024-02-12 RX ORDER — AMOXICILLIN AND CLAVULANATE POTASSIUM 400; 57 MG/5ML; MG/5ML
10 POWDER, FOR SUSPENSION ORAL 2 TIMES DAILY
Qty: 140 ML | Refills: 0 | Status: SHIPPED | OUTPATIENT
Start: 2024-02-12 | End: 2024-02-19

## 2024-02-13 ENCOUNTER — TELEPHONE (OUTPATIENT)
Dept: URGENT CARE | Age: 29
End: 2024-02-13

## 2024-02-13 NOTE — TELEPHONE ENCOUNTER
Augmentin was ordered as alternative when pt called office yesterday due to difficulty obtaining liquid version of doxy for her sinusitis and otitis media.  She requested liquid version due to difficulty with swallowing 2/2 sore throat.  We had discussed potential interaction with amox and methotrexate during her office visit.   Left VM notifying that augmentin does contain amox, requested call back.   Will plan to discuss risk/benefit and whether she would be agreeable to pill version of doxy instead.

## 2024-02-13 NOTE — TELEPHONE ENCOUNTER
Reviewed with pt that Augmentin does contain amoxicillin (possible methotrexate interaction).   I reached out to her rheumatologist who confirmed it is ok for pt to continue Augmentin. Pt will hold her next dose or 2 of methotrexate until she is feeling better.  Pt is also scheduled for recheck with PCP tomorrow.

## 2024-02-14 ENCOUNTER — OFFICE VISIT (OUTPATIENT)
Dept: FAMILY MEDICINE CLINIC | Facility: CLINIC | Age: 29
End: 2024-02-14
Payer: COMMERCIAL

## 2024-02-14 ENCOUNTER — OFFICE VISIT (OUTPATIENT)
Dept: NEPHROLOGY | Facility: CLINIC | Age: 29
End: 2024-02-14
Payer: COMMERCIAL

## 2024-02-14 ENCOUNTER — OFFICE VISIT (OUTPATIENT)
Dept: ENDOCRINOLOGY | Facility: CLINIC | Age: 29
End: 2024-02-14
Payer: COMMERCIAL

## 2024-02-14 VITALS
WEIGHT: 132.2 LBS | HEART RATE: 84 BPM | HEIGHT: 61 IN | OXYGEN SATURATION: 96 % | RESPIRATION RATE: 16 BRPM | TEMPERATURE: 97.6 F | BODY MASS INDEX: 24.96 KG/M2 | SYSTOLIC BLOOD PRESSURE: 116 MMHG | DIASTOLIC BLOOD PRESSURE: 75 MMHG

## 2024-02-14 VITALS
WEIGHT: 133.2 LBS | DIASTOLIC BLOOD PRESSURE: 84 MMHG | BODY MASS INDEX: 25.15 KG/M2 | HEART RATE: 93 BPM | SYSTOLIC BLOOD PRESSURE: 110 MMHG | HEIGHT: 61 IN

## 2024-02-14 VITALS
WEIGHT: 132 LBS | HEART RATE: 80 BPM | DIASTOLIC BLOOD PRESSURE: 80 MMHG | BODY MASS INDEX: 24.92 KG/M2 | SYSTOLIC BLOOD PRESSURE: 116 MMHG | HEIGHT: 61 IN

## 2024-02-14 DIAGNOSIS — I95.0 IDIOPATHIC HYPOTENSION: Primary | ICD-10-CM

## 2024-02-14 DIAGNOSIS — E10.649 UNCONTROLLED TYPE 1 DIABETES MELLITUS WITH HYPOGLYCEMIA WITHOUT COMA (HCC): Primary | ICD-10-CM

## 2024-02-14 DIAGNOSIS — J40 BRONCHITIS: Primary | ICD-10-CM

## 2024-02-14 DIAGNOSIS — E10.9 TYPE 1 DIABETES MELLITUS WITHOUT COMPLICATION (HCC): ICD-10-CM

## 2024-02-14 DIAGNOSIS — R56.9 SEIZURES (HCC): ICD-10-CM

## 2024-02-14 DIAGNOSIS — M35.9 UNDIFFERENTIATED CONNECTIVE TISSUE DISEASE (HCC): ICD-10-CM

## 2024-02-14 PROCEDURE — 99214 OFFICE O/P EST MOD 30 MIN: CPT | Performed by: NURSE PRACTITIONER

## 2024-02-14 PROCEDURE — 99215 OFFICE O/P EST HI 40 MIN: CPT | Performed by: INTERNAL MEDICINE

## 2024-02-14 PROCEDURE — 99213 OFFICE O/P EST LOW 20 MIN: CPT | Performed by: INTERNAL MEDICINE

## 2024-02-14 RX ORDER — GLUCAGON INJECTION, SOLUTION 1 MG/.2ML
INJECTION, SOLUTION SUBCUTANEOUS
Qty: 0.4 ML | Refills: 0 | Status: SHIPPED | OUTPATIENT
Start: 2024-02-14

## 2024-02-14 RX ORDER — INSULIN ASPART 100 [IU]/ML
INJECTION, SOLUTION INTRAVENOUS; SUBCUTANEOUS
COMMUNITY
Start: 2024-02-12 | End: 2024-02-14 | Stop reason: SDUPTHER

## 2024-02-14 RX ORDER — INSULIN ASPART 100 [IU]/ML
INJECTION, SOLUTION INTRAVENOUS; SUBCUTANEOUS
Qty: 90 ML | Refills: 3 | Status: SHIPPED | OUTPATIENT
Start: 2024-02-14

## 2024-02-14 RX ORDER — SODIUM CHLORIDE 1 G/1
1 TABLET ORAL AS NEEDED
Qty: 30 TABLET | Refills: 0 | Status: SHIPPED | OUTPATIENT
Start: 2024-02-14 | End: 2024-03-15

## 2024-02-14 RX ORDER — METFORMIN HYDROCHLORIDE 500 MG/1
500 TABLET, EXTENDED RELEASE ORAL
Qty: 30 TABLET | Refills: 5 | Status: SHIPPED | OUTPATIENT
Start: 2024-02-14

## 2024-02-14 RX ORDER — BENZONATATE 100 MG/1
100 CAPSULE ORAL 3 TIMES DAILY PRN
Qty: 20 CAPSULE | Refills: 0 | Status: SHIPPED | OUTPATIENT
Start: 2024-02-14

## 2024-02-14 RX ORDER — FLUTICASONE PROPIONATE AND SALMETEROL 250; 50 UG/1; UG/1
1 POWDER RESPIRATORY (INHALATION) 2 TIMES DAILY
Qty: 60 BLISTER | Refills: 0 | Status: SHIPPED | OUTPATIENT
Start: 2024-02-14

## 2024-02-14 NOTE — PATIENT INSTRUCTIONS
Your blood pressure is currently stable.  Continue increased salt intake and fluid intake.  Try compression socks on your legs.  Continue conservative measures such as crossing your legs while sitting and stand up slowly to avoid drops in blood pressure.  Continue to follow with neurologist regarding vestibular migraine treatment.  He can follow-up in nephrology office as needed.

## 2024-02-14 NOTE — PROGRESS NOTES
Name: Jenny Rodrigues      : 1995      MRN: [id number]  Encounter Provider: MARGARET Meadows  Encounter Date: 2024   Encounter [facility]    Assessment & Plan     1. Bronchitis  Assessment & Plan:  Reviewed notes from urgent care.  She is on augmentin and not feeling improved so far.  Lungs clear, vitals wnl.  Spastic coughing spell observed; o2 sat after this is 99%.  Will start advair bid, prn benzonatate.  Continue full course of antibiotic.  Pt instructed to call for reevaluation if sx worsen or persist.      Orders:  -     Fluticasone-Salmeterol (Advair) 250-50 mcg/dose inhaler; Inhale 1 puff 2 (two) times a day Rinse mouth after use.  -     benzonatate (TESSALON PERLES) 100 mg capsule; Take 1 capsule (100 mg total) by mouth 3 (three) times a day as needed for cough    2. Seizures ([facility])  Assessment & Plan:  Seizure was due to hypoglycemia; no seizure activity in estimated 4 years.  Ongoing follow up with neuro, endocrinology for diabetes management.             Subjective      Pt is a 30 yo female here today for urgent care follow up.  She started with symptoms on .  Seen at [facility]  with complaint of worsening uri symptoms with cough, fever, left ear pain, sinus pressure, congestion, and post-nasal drip.  Her chest x-ray was wnl, she was diagnosed with sinusitis and R OM.  She was initially prescribed liquid doxycycline but then it was ultimately changed to liquid augmentin.  Per her rheumatologist she was advised to hold her methotrexate for a couple of doses and then restart it.  She does not feel much better yet, she continues with sweats and chills.  She continues to cough, headache, neck pain, chest pain when she coughs, nasal drainage, sore throat.  Appetite is decreased for the last 2 weeks, she denies n/v/d other than some nausea when she coughs.  She is taking acetaminophen, drinking water and tea.      Review of Systems  "  Constitutional:  Positive for appetite change, chills and diaphoresis. Negative for fatigue and fever.   HENT:  Positive for ear pain, postnasal drip, rhinorrhea, sneezing and sore throat. Negative for congestion and sinus pressure.    Respiratory:  Positive for cough. Negative for chest tightness, shortness of breath and wheezing.    Cardiovascular:  Positive for chest pain (with coughing). Negative for palpitations and leg swelling.   Gastrointestinal:  Negative for diarrhea, nausea and vomiting.   Musculoskeletal:  Positive for myalgias.   Neurological:  Positive for headaches. Negative for dizziness and light-headedness.   Hematological:  Negative for adenopathy.       Current Outpatient Medications on File Prior to Visit   Medication Sig    amoxicillin-clavulanate (AUGMENTIN) 400-57 mg/5 mL suspension Take 10 mL by mouth 2 (two) times a day for 7 days    BD Insulin Syringe U/F 31G X 5/16\" 0.5 ML MISC Use as directly with weekly methotrexate injection.    Belimumab (BENLYSTA IV) Inject into a catheter in a vein every 14 (fourteen) days Switching to monthly on 12/5    clindamycin (CLEOCIN T) 1 % lotion Apply 1 application. topically 2 (two) times a day    Cyanocobalamin 1000 MCG SUBL Place 1 tablet (1,000 mcg total) under the tongue daily    folic acid (FOLVITE) 1 mg tablet Take 1 tablet (1 mg total) by mouth daily    gabapentin (Neurontin) 300 mg capsule Take 1 capsule (300 mg total) by mouth daily at bedtime PRN    Glucagon (Gvoke HypoPen 2-Pack) 1 MG/0.2ML SOAJ 1 mg PRN for severe hypoglycemia    glucagon 1 MG injection 1 mg    hydroxychloroquine (PLAQUENIL) 200 mg tablet Take 1 tablet (200 mg total) by mouth 2 (two) times a day with meals    Insulin Pen Needle (BD PEN NEEDLE NASIM U/F) 32G X 4 MM MISC by Does not apply route 4 (four) times a day for 180 days    levonorgestrel-ethinyl estradiol (NORDETTE) 0.15-30 MG-MCG per tablet Take 1 tablet by mouth daily    levothyroxine 25 mcg tablet Take 1 tablet (25 " mcg total) by mouth daily    LORazepam (Ativan) 0.5 mg tablet Take 1 tablet (0.5 mg total) by mouth 2 (two) times a day as needed for anxiety    metFORMIN (GLUCOPHAGE-XR) 500 mg 24 hr tablet Take 1 tablet (500 mg total) by mouth daily with dinner    methotrexate 50 MG/2ML injection Inject 0.8 mL (20 mg total) under the skin once a week    miconazole 2 % cream Apply 1 Application topically 2 (two) times a day To affected area    NovoLOG 100 UNIT/ML injection Up to 100 units per day with insulin pump    OneTouch Verio test strip USE 4 TIMES A DAY BEFORE MEALS AND AT BEDTIME    sodium chloride 1 g tablet Take 1 tablet (1 g total) by mouth if needed (for dizziness an lightheadendess in setting of low BP)    Tresiba FlexTouch 100 units/mL injection pen Inject 30 Units under the skin daily    divalproex sodium (Depakote ER) 250 mg 24 hr tablet Take 2 tabs for 3 days, then 1 tab for 2 days (Patient not taking: Reported on 2/14/2024)    doxycycline (VIBRAMYCIN) 50 MG/5ML SYRP Take 10 mL (100 mg total) by mouth 2 (two) times a day for 7 days (Patient not taking: Reported on 2/14/2024)    Glucagon HCl (Glucagon Emergency) 1 MG/ML SOLR INJECT 1 MG AS DIRECTED AS NEEDED (WHEN PASSED OUT FROM LOW BLOOD SUGAR). (Patient not taking: Reported on 2/14/2024)    Insulin Disposable Pump (Omnipod 5 G6 Pod, Gen 5,) MISC INJECT 1 EACH UNDER THE SKIN EVERY THIRD DAY. E10.65 (Patient not taking: Reported on 2/14/2024)    insulin lispro (HumaLOG) 100 units/mL injection Inject 15 Units under the skin 3 (three) times a day before meals (Patient not taking: Reported on 2/14/2024)    ketoconazole (NIZORAL) 2 % shampoo Apply topically once (Patient not taking: Reported on 1/26/2024)    ketorolac (TORADOL) 10 mg tablet Take 1 tablet (10 mg total) by mouth every 6 (six) hours as needed for moderate pain (Patient not taking: Reported on 2/14/2024)    tretinoin (REFISSA) 0.05 % cream  (Patient not taking: Reported on 2/14/2024)    tretinoin (RETIN-A)  "0.025 % cream Apply topically daily at bedtime (Patient not taking: Reported on 2/14/2024)    [DISCONTINUED] NovoLOG 100 UNIT/ML injection        Objective     /75   Pulse 84   Temp 97.6 °F (36.4 °C) (Tympanic)   Resp 16   Ht 5' 1\" (1.549 m)   Wt 60 kg (132 lb 3.2 oz)   SpO2 96%   BMI 24.98 kg/m²     Physical Exam  Vitals reviewed.   Constitutional:       General: She is awake. She is not in acute distress.     Appearance: Normal appearance. She is well-developed and well-groomed. She is not ill-appearing.   HENT:      Right Ear: Hearing, ear canal and external ear normal. No middle ear effusion. Tympanic membrane is erythematous. Tympanic membrane is not bulging.      Left Ear: Hearing, tympanic membrane, ear canal and external ear normal.  No middle ear effusion. Tympanic membrane is not erythematous or bulging.      Nose: Congestion present. No mucosal edema or rhinorrhea.      Mouth/Throat:      Lips: Pink.      Mouth: Mucous membranes are moist. Mucous membranes are not dry.      Pharynx: Oropharynx is clear. Posterior oropharyngeal erythema present. No oropharyngeal exudate.      Tonsils: No tonsillar abscesses.   Eyes:      Conjunctiva/sclera: Conjunctivae normal.   Cardiovascular:      Rate and Rhythm: Regular rhythm. Tachycardia present.      Heart sounds: Normal heart sounds. No murmur heard.  Pulmonary:      Effort: Pulmonary effort is normal. Tachypnea (after coughing spell) present. No accessory muscle usage or respiratory distress.      Breath sounds: Normal breath sounds. No decreased breath sounds, wheezing, rhonchi or rales.      Comments: Spastic coughing observed  Lymphadenopathy:      Head:      Right side of head: No submental, submandibular, tonsillar, preauricular, posterior auricular or occipital adenopathy.      Left side of head: No submental, submandibular, tonsillar, preauricular, posterior auricular or occipital adenopathy.      Cervical: No cervical adenopathy.   Skin:     " General: Skin is warm and dry.   Neurological:      Mental Status: She is alert and oriented to person, place, and time.   Psychiatric:         Attention and Perception: Attention normal.         Mood and Affect: Mood normal.         Speech: Speech normal.         Behavior: Behavior normal. Behavior is cooperative.         Thought Content: Thought content normal.         Cognition and Memory: Cognition normal.         Judgment: Judgment normal.       MARGARET Meadows

## 2024-02-14 NOTE — ASSESSMENT & PLAN NOTE
Seizure was due to hypoglycemia; no seizure activity in estimated 4 years.  Ongoing follow up with neuro, endocrinology for diabetes management.

## 2024-02-14 NOTE — PROGRESS NOTES
New Patient Progress Note      No chief complaint on file.     Referring Provider  Yane Gaitan, You  [address]  [place] [address]     History of Present Illness:   Jenny Rodrigues is a 29 y.o. female with a history of type 1 diabetes with long term use of insulin since Jan 2019 with recurrent DKA episodes. Reports she has had upto 18 episodes of DKA since her diagnosis.  Has SLE on HCQ, Belimumab, MTX. Has uncontrolled bipolar disorder which also makes it difficult for her to have patience, she would end up ripping off her pump if it beeps so much.   She is sick in office today with cough, congestion, possible PNA with sinusitis and otitis media, no steroid use.  Patient is following up from a recent hospital admission for DKA when she was seen by our team. Came back from her [facility] in [place] on Feb 10th.  She previously followed with  endocrinology.    She has had problems with Omnipod 5 including allergic skin reactions including redness, lumps, leakage, tenderness etc.     home glucose monitoring: are performed regularly    CGM interpretation    Time percentage CGM worn 86 %   Time in range 27% (goal >65-70%)  High 20 %  Very high 51 %  Low 1 %  Very low <1 %     mg/dl (goal <50)     Average glucose 250 mg/dL  GMI 9.3 %      Current regimen: Took Tresiba 32 u hs, lispro with dinner yesterday  Used Omnipod 5 since July 2023, previously Tandem which she did not have skin allergic reactions to. Stopped using Omnipod 5 yesterday, does not remember exact settings, has not brought it with her  Basal rate: does not remember  ICR 1:6  ISF 1:50   -120 mg/dl     Hypoglycemic episodes:  reports she had a couple of low BG 40 on her honeymoon while sleeping, does not have hypoglycemia awareness  H/o of hypoglycemia causing hospitalization or Intervention such as glucagon injection  or ambulance call :  Yes    Diabetes education: Yes   Diet: Ranges from 1 meals per day,  7-8 snacks per day to 7-8 small meals   Timing of meals is predictable. No  diabetic diet compliance:  noncompliant much of the time   Takes up to 30-40 g carb with a meal, does not regularly count carbs     further diabetic ROS: no polyuria or polydipsia, no numbness, tingling or pain in extremities, no unusual visual symptoms, no medication side effects noted, has dysesthesias in the feet        Opthamology: sees yes; no retinopathy  Podiatry: no, has neuropathy sx    Thyroid disorders: Hashimoto's on LT 25 mcg QAM  History of pancreatitis: states father told her she might have had pancreatitis when young, unsure     Patient Active Problem List   Diagnosis    Patellofemoral pain syndrome of right knee    Abnormal liver function test    Right sided abdominal pain    Chronic fatigue and malaise    Uncontrolled type 1 diabetes mellitus with hypoglycemia without coma (HCC)    Abnormal stools    Urinary frequency    Concussion without loss of consciousness    Nausea and vomiting    Agitation    Chronic post-traumatic stress disorder (PTSD)    Generalized anxiety disorder with panic attacks    Activity intolerance    OCD (obsessive compulsive disorder)    Chronic pain of right knee    Chronic abdominal pain    Blood in the stool    Visual changes    Hashimoto's thyroiditis    Dysuria    Paresthesia of left leg    Vitamin D deficiency    Neuropathy    Polyarthritis    Muscle weakness    Word finding difficulty    Insulin pump status    Secondary amenorrhea    Chronic back pain    Syncope    Vulvodynia    Yeast infection    Bipolar affective disorder (HCC)    Weakness generalized    Low serum vitamin B12    Arthralgia    Positive SOHAM (antinuclear antibody)    Seizures (HCC)    History of anesthesia complications    Rheumatoid factor positive    Hyperlipidemia    Fatty stools    Left low back pain    Visual hallucinations    Diabetic ketoacidosis associated with type 1 diabetes mellitus (HCC)    Elevated prolactin level     Type 1 diabetes mellitus (HCC)    Nail abnormality    Verruca    Primary hypothyroidism    Physical deconditioning    Right-sided back pain    Other chest pain    Gastroesophageal reflux disease    Constipation    Bruising    Type 1 diabetes mellitus with hyperglycemia (HCC)    Controlled diabetes mellitus type 1 with complications (HCC)    Delayed emergence from anesthesia    Hearing loss of right ear    Undifferentiated connective tissue disease (HCC)    Pigmentation abnormality of skin    Idiopathic hypotension    Other forms of systemic lupus erythematosus (HCC)    Pre-conception counseling    Uncontrolled type 1 diabetes mellitus with hyperglycemia (HCC)    Lupus (HCC)    Left hemiparesis (HCC)    Vestibular migraine    Cognitive attention deficit      Past Medical History:   Diagnosis Date    Abdominal pain     Anaphylaxis     Anxiety     Anxiety and depression     COVID-19 12/2020    Delayed emergence from anesthesia 03/23/2023    Severe episode of emergence delirium (confused, combative) after MAC anesthetic on 03/2023 for EGD. Received versed 2mg, >50mcg precedex, 5mg IV haldol, and 50mcg fentanyl. Waxing and waning episodes of agitation. Any future anesthetics should NOT be done at [facility].    Delirium, induced by drug     anesthesia    Depression     Diabetes mellitus (HCC)     Disease of thyroid gland     Hashimoto    DKA, type 1 (HCC) 05/11/2021    Eating disorder     history of anorexia/bulemia [id number]    Food intolerance     Fracture of fibula     R Salter I    Gilbert disease     Hashimoto's disease 02/21/2020    Head injury     Headache(784.0)     Nasal congestion     PTSD (post-traumatic stress disorder)     Rectal bleed     Seizures (HCC)     Type 1 diabetes (HCC)       Past Surgical History:   Procedure Laterality Date    COLONOSCOPY      KNEE SURGERY Right 07/06/2020    NASAL SEPTOPLASTY W/ TURBINOPLASTY      SINUS SURGERY      SKIN BIOPSY      TURBINOPLASTY N/A 3/22/2021    Procedure:  "TURBINOPLASTY;  Surgeon: Jn Hidalgo MD;  Location: [facility];  Service: ENT    UPPER GASTROINTESTINAL ENDOSCOPY      WISDOM TOOTH EXTRACTION      WRIST SURGERY      left; Excision of ganglion      Family History   Problem Relation Age of Onset    Hypertension Mother     Migraines Mother         Headache    Diabetes type II Mother     Varicose Veins Mother     Hyperlipidemia Mother     Diabetes Mother     Arthritis Mother     Depression Mother     Hearing loss Mother     Anxiety disorder Mother     Eczema Father     Cholelithiasis Father     Hypertension Father     Sarcoidosis Father         Liver    Hyperlipidemia Father     Diabetes Father     Coronary artery disease Father     Nephrolithiasis Father     Cirrhosis Father     Alcohol abuse Father     Thyroid disease Sister     Hashimoto's thyroiditis Sister     Alcohol abuse Brother     Cancer Family     Diabetes Family     Hypertension Family      Social History     Tobacco Use    Smoking status: Never     Passive exposure: Never    Smokeless tobacco: Never    Tobacco comments:     Tobacco smoke exposure (Father smokes cigars)   Substance Use Topics    Alcohol use: Yes     Comment: rarely     Allergies   Allergen Reactions    Insulin Glargine Other (See Comments)     LANTUS BRAND -Burning and redness under skin          Current Outpatient Medications:     amoxicillin-clavulanate (AUGMENTIN) 400-57 mg/5 mL suspension, Take 10 mL by mouth 2 (two) times a day for 7 days, Disp: 140 mL, Rfl: 0    BD Insulin Syringe U/F 31G X 5/16\" 0.5 ML MISC, Use as directly with weekly methotrexate injection., Disp: 100 each, Rfl: 0    Belimumab (BENLYSTA IV), Inject into a catheter in a vein every 14 (fourteen) days Switching to monthly on 12/5, Disp: , Rfl:     clindamycin (CLEOCIN T) 1 % lotion, Apply 1 application. topically 2 (two) times a day, Disp: , Rfl:     Cyanocobalamin 1000 MCG SUBL, Place 1 tablet (1,000 mcg total) under the tongue daily, Disp: 90 tablet, Rfl: " 0    folic acid (FOLVITE) 1 mg tablet, Take 1 tablet (1 mg total) by mouth daily, Disp: 90 tablet, Rfl: 3    gabapentin (Neurontin) 300 mg capsule, Take 1 capsule (300 mg total) by mouth daily at bedtime PRN, Disp: 90 capsule, Rfl: 3    Glucagon (Gvoke HypoPen 2-Pack) 1 MG/0.2ML SOAJ, 1 mg PRN for severe hypoglycemia, Disp: 0.4 mL, Rfl: 0    glucagon 1 MG injection, 1 mg, Disp: , Rfl:     hydroxychloroquine (PLAQUENIL) 200 mg tablet, Take 1 tablet (200 mg total) by mouth 2 (two) times a day with meals, Disp: 180 tablet, Rfl: 3    Insulin Pen Needle (BD PEN NEEDLE NASIM U/F) 32G X 4 MM MISC, by Does not apply route 4 (four) times a day for 180 days, Disp: 400 each, Rfl: 3    levonorgestrel-ethinyl estradiol (NORDETTE) 0.15-30 MG-MCG per tablet, Take 1 tablet by mouth daily, Disp: , Rfl:     levothyroxine 25 mcg tablet, Take 1 tablet (25 mcg total) by mouth daily, Disp: 60 tablet, Rfl: 0    LORazepam (Ativan) 0.5 mg tablet, Take 1 tablet (0.5 mg total) by mouth 2 (two) times a day as needed for anxiety, Disp: 60 tablet, Rfl: 0    metFORMIN (GLUCOPHAGE-XR) 500 mg 24 hr tablet, Take 1 tablet (500 mg total) by mouth daily with dinner, Disp: 30 tablet, Rfl: 5    methotrexate 50 MG/2ML injection, Inject 0.8 mL (20 mg total) under the skin once a week, Disp: 8 mL, Rfl: 4    miconazole 2 % cream, Apply 1 Application topically 2 (two) times a day To affected area, Disp: , Rfl:     NovoLOG 100 UNIT/ML injection, Up to 100 units per day with insulin pump, Disp: 90 mL, Rfl: 3    OneTouch Verio test strip, USE 4 TIMES A DAY BEFORE MEALS AND AT BEDTIME, Disp: , Rfl:     Tresiba FlexTouch 100 units/mL injection pen, Inject 30 Units under the skin daily, Disp: 15 mL, Rfl: 0    divalproex sodium (Depakote ER) 250 mg 24 hr tablet, Take 2 tabs for 3 days, then 1 tab for 2 days (Patient not taking: Reported on 2/14/2024), Disp: 8 tablet, Rfl: 1    doxycycline (VIBRAMYCIN) 50 MG/5ML SYRP, Take 10 mL (100 mg total) by mouth 2 (two) times a  "day for 7 days (Patient not taking: Reported on 2/14/2024), Disp: 140 mL, Rfl: 0    Glucagon HCl (Glucagon Emergency) 1 MG/ML SOLR, INJECT 1 MG AS DIRECTED AS NEEDED (WHEN PASSED OUT FROM LOW BLOOD SUGAR). (Patient not taking: Reported on 2/14/2024), Disp: , Rfl:     Insulin Disposable Pump (Omnipod 5 G6 Pod, Gen 5,) MISC, INJECT 1 EACH UNDER THE SKIN EVERY THIRD DAY. E10.65 (Patient not taking: Reported on 2/14/2024), Disp: , Rfl:     insulin lispro (HumaLOG) 100 units/mL injection, Inject 15 Units under the skin 3 (three) times a day before meals (Patient not taking: Reported on 2/14/2024), Disp: 10 mL, Rfl: 0    ketoconazole (NIZORAL) 2 % shampoo, Apply topically once (Patient not taking: Reported on 1/26/2024), Disp: , Rfl:     ketorolac (TORADOL) 10 mg tablet, Take 1 tablet (10 mg total) by mouth every 6 (six) hours as needed for moderate pain (Patient not taking: Reported on 2/14/2024), Disp: 10 tablet, Rfl: 2    tretinoin (REFISSA) 0.05 % cream, , Disp: , Rfl:     tretinoin (RETIN-A) 0.025 % cream, Apply topically daily at bedtime (Patient not taking: Reported on 2/14/2024), Disp: , Rfl:   Review of Systems  10 point ROS performed, negative except stated above in HPI    Physical Exam:  Body mass index is 25.17 kg/m².  /84   Pulse 93   Ht 5' 1\" (1.549 m)   Wt 60.4 kg (133 lb 3.2 oz)   BMI 25.17 kg/m²    Wt Readings from Last 3 Encounters:   02/14/24 60.4 kg (133 lb 3.2 oz)   01/26/24 58.2 kg (128 lb 3.2 oz)   01/16/24 61.6 kg (135 lb 12.9 oz)       Physical Exam  Constitutional:       General: She is not in acute distress.  HENT:      Head: Normocephalic.      Nose:      Comments: Wearing mask, appears congested     Mouth/Throat:      Comments: Voice hoarse, constant dry cough  Eyes:      Conjunctiva/sclera: Conjunctivae normal.   Cardiovascular:      Rate and Rhythm: Normal rate.   Pulmonary:      Effort: Pulmonary effort is normal.   Musculoskeletal:         General: Normal range of motion. " "  Skin:     General: Skin is dry.   Neurological:      Mental Status: She is alert and oriented to person, place, and time. Mental status is at baseline.   Psychiatric:         Mood and Affect: Mood normal.         Thought Content: Thought content normal.           Labs:   Lab Results   Component Value Date    HGBA1C 9.6 (H) 01/14/2024         Lab Results   Component Value Date    CREATININE 0.75 01/16/2024    CREATININE 0.64 01/15/2024    CREATININE 0.59 (L) 01/15/2024    BUN 6 01/16/2024    K 3.5 01/16/2024     01/16/2024    CO2 22 01/16/2024     GFR, Calculated   Date Value Ref Range Status   12/29/2020 69 >60 mL/min/1.73m2 Final     Comment:     mL/min per 1.73 square meters                                            Normal Function or Mild Renal    Disease (if clinically at risk):  >or=60  Moderately Decreased:                30-59  Severely Decreased:                  15-29  Renal Failure:                         <15                                            -American GFR: multiply reported GFR by 1.16    Please note that the eGFR is based on the CKD-EPI calculation, and is not intended to be used for drug dosing.     eGFRcr   Date Value Ref Range Status   07/09/2023 125 >59 Final     eGFR   Date Value Ref Range Status   01/16/2024 110 ml/min/1.73sq m Final     No components found for: \"MALBCRER\"    Lab Results   Component Value Date    HDL 51 02/02/2023    TRIG 72 02/02/2023       Lab Results   Component Value Date    ALT 12 01/16/2024    AST 16 01/16/2024    GGT 10 06/26/2019    ALKPHOS 42 01/16/2024       Lab Results   Component Value Date    TSH 1.77 03/11/2021    FREET4 1.10 07/16/2021       Impression:  Diagnoses and all orders for this visit:    Uncontrolled type 1 diabetes mellitus with hypoglycemia without coma (HCC)  -     Ambulatory referral to Diabetic Education; Future  -     Ambulatory referral to Podiatry; Future  -     Albumin / creatinine urine ratio; Future  -     Hemoglobin " A1C; Future  -     metFORMIN (GLUCOPHAGE-XR) 500 mg 24 hr tablet; Take 1 tablet (500 mg total) by mouth daily with dinner  -     NovoLOG 100 UNIT/ML injection; Up to 100 units per day with insulin pump  -     Glucagon (Gvoke HypoPen 2-Pack) 1 MG/0.2ML SOAJ; 1 mg PRN for severe hypoglycemia      Plan:  Review of CGM data shows that patient is not at goal; average  with TIR only 27%, with highs 20% and 51% very high.  Patient prefers not to use MDI basal bolus regimen as she has difficulty with injecting short acting insulin with memory issues, accurate carb counting and variability of her meal timings and composition.  She also does not want to go back to OmniPod 5 due to problems with skin reactions and leakage.  However, states that she would like to resume t:slim X2 that she was on previously as it did not give her skin reactions and she had better glycemic control and felt comfortable using it.  Patient took Tresiba 32 units yesterday afternoon when she suspending her OmniPod pump; advised to resume tandem this afternoon.  Provided referral to diabetic education for MNT as well as pump upgrade to tandem.  Provided referral to podiatry for diabetic foot exams, continue regular eye exams.  Provided prescription for glucagon injection if needed  Provided prescription for metformin  mg daily as patient benefited from it in the past  Advised patient to send in her basal rate values to us for record and will also provide recommendations on up to a 50% increment in dose during sick days such as these    Discussed with the patient and all questioned fully answered. She will call me if any problems arise.    Counseled patient on diagnostic results, prognosis, risk and benefit of treatment options, instruction for management, importance of treatment compliance, Risk  factor reduction and impressions

## 2024-02-14 NOTE — PROGRESS NOTES
NEPHROLOGY OFFICE VISIT   Jenny Rodrigues 29 y.o. female MRN: [id number]  2/14/2024    Reason for Visit: Hypotension    ASSESSMENT and PLAN:  It was a pleasure evaluating your patient in the office today. Thank you for allowing our team to participate in the care of Ms. Jenny Rodrigues. Please do not hesitate to contact our team if further issues/questions shall arise in the interim.     # Intermittent episodes of hypotension  - Blood pressure in the office today: 116/80  - No evidence of orthostatic hypotension based on previous evaluation  - Most recently being considered for vestibular migraine that can lead to some of her symptoms  - She has history of diabetes which can lead to autonomic dysfunction   - She has history of rheumatologic disease and did receive steroids for flareup of her disease but adrenal insufficiency has been ruled out (a.m. cortisol was borderline at 12.7, cosyntropin stimulation test with appropriate increase in cortisol to 32.8 in 12/2023)  - Diabetic patients can develop hyporeninemic hypoaldosteronism but there is no evidence of hyperkalemia or metabolic acidosis to suggest hyporeninemic hypoaldosteronism  - Echocardiogram 11/2023 with LVEF 60%, normal diastolic function, normal right ventricular systolic function    >>For now  - Management of vestibular migraine per neurology  - I provided her with sodium chloride tablet 1 g to be taken as needed for symptomatic hypotension  - Since increasing her salt intake and oral hydration her episodes of hypotension decreased  - Continue increased salt intake  - Continue increased oral hydration  - Patient was advised to use compression stockings  - Hold off on midodrine due to extensive side effect profile such as bradycardia and supine hypertension  - Follow-up in nephrology office as needed     # Undifferentiated connective tissue disease  - This was diagnosed based on a positive SOHAM 1 ratio 80 nuclear, speckled, homogenous patterns,  arthralgia, malar rash and photosensitivity  - Notes reviewed from rheumatology she is currently on hydroxychloroquine 200 mg daily on the weekdays and 200 mg once daily on the weekend, subcutaneous methotrexate 20 mg once weekly and monthly Benlysta infusions    # Type 1 diabetes mellitus  - Most recent HbA1c 8.7 10/2023   - Notes reviewed from endocrinology  - Plans noted for continuation of insulin, metformin was also added today for control of diabetes     # Renal function  - Baseline creatinine appears to be around 0.7-0.9 most recently 0.75 01/2024  - Urinalysis: Negative protein, 1-2 RBC, 1-2 WBC 01/2024  - Proteinuria: No, UACR less than 17 mg/g 10/2023, UPCR 50 mg 12/2023  - No current evidence of diabetic nephropathy or renal involvement of connective tissue disease in absence of hematuria or proteinuria     # Headache  - Reviewed notes from neurology and vestibular migraine being considered  - She was given a trial of Depakote which helped  - She will continue to follow-up with neurology    HPI:  Since last visit: Patient was admitted with DKA.  She was recently diagnosed with pneumonia and being treated with Augmentin.  Episodes of dizziness/lightheadedness have decreased in frequency.  She complains of shortness of breath and cough in setting of pneumonia.  She denies leg swelling.    Jenny Rodrigues is a 29 y.o. female who was seen in nephrology office today for evaluation of intermittent episodes of dizziness associated with hypotension.  This started in February and has been getting more persistent.  During episodes of dizziness she notes that her blood pressure is low.  Lower systolic blood pressure that she has noticed is 85.  Lowest diastolic blood pressure has gone down into 40s.  She has never had syncope.  She is diabetic since 2019 and is on insulin pump.  She has history of undifferentiated connective tissue disease and is currently on Plaquenil.  She has had few instances of need for  "steroid use for flareup of her connective tissue disease.  She was switched to belimumab as a steroid sparing agent for her connective tissue disease.     >> Medical history evaluation   - Diabetes: Since 2019  - Hypertension: No   - Age ? 55 years: No   - Family history of kidney disease: No   - Obesity or metabolic syndrome: No   - Prior kidney disease or dialysis: No   - Incidental hematuria in the past: No   - Urinary symptoms: Frequency   - History of foamy or frothy urine: Yes at times   - History of nephrolithiasis: No   - Systemic diseases that might affect kidney: ? Lupus   - History of use of medications that might affect renal function: Occasional NSAID use     OBJECTIVE:  Current Weight: Weight - Scale: 59.9 kg (132 lb)  Vitals:    02/14/24 1405   BP: 116/80   BP Location: Right arm   Patient Position: Sitting   Cuff Size: Standard   Pulse: 80   Weight: 59.9 kg (132 lb)   Height: 5' 1\" (1.549 m)    Body mass index is 24.94 kg/m².      REVIEW OF SYSTEMS:    Review of Systems   Constitutional:  Negative for chills and fever.   HENT:  Negative for ear pain and sore throat.    Eyes:  Negative for pain and visual disturbance.   Respiratory:  Positive for cough and shortness of breath.    Cardiovascular:  Negative for chest pain and palpitations.   Gastrointestinal:  Negative for abdominal pain and vomiting.   Genitourinary:  Negative for dysuria and hematuria.   Musculoskeletal:  Negative for arthralgias and back pain.   Skin:  Negative for color change and rash.   Neurological:  Negative for seizures and syncope.   All other systems reviewed and are negative.      Past Medical History:   Diagnosis Date    Abdominal pain     Anaphylaxis     Anxiety     Anxiety and depression     COVID-19 12/2020    Delayed emergence from anesthesia 03/23/2023    Severe episode of emergence delirium (confused, combative) after MAC anesthetic on 03/2023 for EGD. Received versed 2mg, >50mcg precedex, 5mg IV haldol, and 50mcg " fentanyl. Waxing and waning episodes of agitation. Any future anesthetics should NOT be done at [facility].    Delirium, induced by drug     anesthesia    Depression     Diabetes mellitus (HCC)     Disease of thyroid gland     Hashimoto    DKA, type 1 (HCC) 05/11/2021    Eating disorder     history of anorexia/bulemia [id number]    Food intolerance     Fracture of fibula     R Salter I    Gilbert disease     Hashimoto's disease 02/21/2020    Head injury     Headache(784.0)     Nasal congestion     PTSD (post-traumatic stress disorder)     Rectal bleed     Seizures (HCC)     Type 1 diabetes (HCC)        Past Surgical History:   Procedure Laterality Date    COLONOSCOPY      KNEE SURGERY Right 07/06/2020    NASAL SEPTOPLASTY W/ TURBINOPLASTY      SINUS SURGERY      SKIN BIOPSY      TURBINOPLASTY N/A 3/22/2021    Procedure: TURBINOPLASTY;  Surgeon: Jn Hidalgo MD;  Location: [facility];  Service: ENT    UPPER GASTROINTESTINAL ENDOSCOPY      WISDOM TOOTH EXTRACTION      WRIST SURGERY      left; Excision of ganglion       Family History   Problem Relation Age of Onset    Hypertension Mother     Migraines Mother         Headache    Diabetes type II Mother     Varicose Veins Mother     Hyperlipidemia Mother     Diabetes Mother     Arthritis Mother     Depression Mother     Hearing loss Mother     Anxiety disorder Mother     Eczema Father     Cholelithiasis Father     Hypertension Father     Sarcoidosis Father         Liver    Hyperlipidemia Father     Diabetes Father     Coronary artery disease Father     Nephrolithiasis Father     Cirrhosis Father     Alcohol abuse Father     Thyroid disease Sister     Hashimoto's thyroiditis Sister     Alcohol abuse Brother     Cancer Family     Diabetes Family     Hypertension Family         Social History     Substance and Sexual Activity   Alcohol Use Yes    Comment: rarely     Social History     Substance and Sexual Activity   Drug Use Never     Social History     Tobacco Use  "  Smoking Status Never    Passive exposure: Never   Smokeless Tobacco Never   Tobacco Comments    Tobacco smoke exposure (Father smokes cigars)       PHYSICAL EXAM:      Physical Exam  Constitutional:       Appearance: Normal appearance.   HENT:      Head: Normocephalic and atraumatic.      Mouth/Throat:      Mouth: Mucous membranes are moist.      Pharynx: Oropharynx is clear.   Cardiovascular:      Rate and Rhythm: Normal rate and regular rhythm.      Pulses: Normal pulses.      Heart sounds: Normal heart sounds.   Pulmonary:      Effort: Pulmonary effort is normal.      Breath sounds: Normal breath sounds.   Abdominal:      General: Bowel sounds are normal.      Palpations: Abdomen is soft.   Musculoskeletal:         General: Normal range of motion.      Right lower leg: No edema.      Left lower leg: No edema.   Skin:     General: Skin is warm and dry.   Neurological:      General: No focal deficit present.      Mental Status: She is alert and oriented to person, place, and time. Mental status is at baseline.   Psychiatric:         Mood and Affect: Mood normal.         Behavior: Behavior normal.         Thought Content: Thought content normal.         Judgment: Judgment normal.         Medications:    Current Outpatient Medications:     amoxicillin-clavulanate (AUGMENTIN) 400-57 mg/5 mL suspension, Take 10 mL by mouth 2 (two) times a day for 7 days, Disp: 140 mL, Rfl: 0    BD Insulin Syringe U/F 31G X 5/16\" 0.5 ML MISC, Use as directly with weekly methotrexate injection., Disp: 100 each, Rfl: 0    Belimumab (BENLYSTA IV), Inject into a catheter in a vein every 14 (fourteen) days Switching to monthly on 12/5, Disp: , Rfl:     clindamycin (CLEOCIN T) 1 % lotion, Apply 1 application. topically 2 (two) times a day, Disp: , Rfl:     Cyanocobalamin 1000 MCG SUBL, Place 1 tablet (1,000 mcg total) under the tongue daily, Disp: 90 tablet, Rfl: 0    folic acid (FOLVITE) 1 mg tablet, Take 1 tablet (1 mg total) by mouth " daily, Disp: 90 tablet, Rfl: 3    gabapentin (Neurontin) 300 mg capsule, Take 1 capsule (300 mg total) by mouth daily at bedtime PRN, Disp: 90 capsule, Rfl: 3    Glucagon (Gvoke HypoPen 2-Pack) 1 MG/0.2ML SOAJ, 1 mg PRN for severe hypoglycemia, Disp: 0.4 mL, Rfl: 0    glucagon 1 MG injection, 1 mg, Disp: , Rfl:     hydroxychloroquine (PLAQUENIL) 200 mg tablet, Take 1 tablet (200 mg total) by mouth 2 (two) times a day with meals, Disp: 180 tablet, Rfl: 3    Insulin Pen Needle (BD PEN NEEDLE NASIM U/F) 32G X 4 MM MISC, by Does not apply route 4 (four) times a day for 180 days, Disp: 400 each, Rfl: 3    levonorgestrel-ethinyl estradiol (NORDETTE) 0.15-30 MG-MCG per tablet, Take 1 tablet by mouth daily, Disp: , Rfl:     levothyroxine 25 mcg tablet, Take 1 tablet (25 mcg total) by mouth daily, Disp: 60 tablet, Rfl: 0    LORazepam (Ativan) 0.5 mg tablet, Take 1 tablet (0.5 mg total) by mouth 2 (two) times a day as needed for anxiety, Disp: 60 tablet, Rfl: 0    metFORMIN (GLUCOPHAGE-XR) 500 mg 24 hr tablet, Take 1 tablet (500 mg total) by mouth daily with dinner, Disp: 30 tablet, Rfl: 5    methotrexate 50 MG/2ML injection, Inject 0.8 mL (20 mg total) under the skin once a week, Disp: 8 mL, Rfl: 4    miconazole 2 % cream, Apply 1 Application topically 2 (two) times a day To affected area, Disp: , Rfl:     NovoLOG 100 UNIT/ML injection, Up to 100 units per day with insulin pump, Disp: 90 mL, Rfl: 3    OneTouch Verio test strip, USE 4 TIMES A DAY BEFORE MEALS AND AT BEDTIME, Disp: , Rfl:     sodium chloride 1 g tablet, Take 1 tablet (1 g total) by mouth if needed (for dizziness an lightheadendess in setting of low BP), Disp: 30 tablet, Rfl: 0    Tresiba FlexTouch 100 units/mL injection pen, Inject 30 Units under the skin daily, Disp: 15 mL, Rfl: 0    divalproex sodium (Depakote ER) 250 mg 24 hr tablet, Take 2 tabs for 3 days, then 1 tab for 2 days (Patient not taking: Reported on 2/14/2024), Disp: 8 tablet, Rfl: 1     "doxycycline (VIBRAMYCIN) 50 MG/5ML SYRP, Take 10 mL (100 mg total) by mouth 2 (two) times a day for 7 days (Patient not taking: Reported on 2/14/2024), Disp: 140 mL, Rfl: 0    Glucagon HCl (Glucagon Emergency) 1 MG/ML SOLR, INJECT 1 MG AS DIRECTED AS NEEDED (WHEN PASSED OUT FROM LOW BLOOD SUGAR). (Patient not taking: Reported on 2/14/2024), Disp: , Rfl:     Insulin Disposable Pump (Omnipod 5 G6 Pod, Gen 5,) MISC, INJECT 1 EACH UNDER THE SKIN EVERY THIRD DAY. E10.65 (Patient not taking: Reported on 2/14/2024), Disp: , Rfl:     insulin lispro (HumaLOG) 100 units/mL injection, Inject 15 Units under the skin 3 (three) times a day before meals (Patient not taking: Reported on 2/14/2024), Disp: 10 mL, Rfl: 0    ketoconazole (NIZORAL) 2 % shampoo, Apply topically once (Patient not taking: Reported on 1/26/2024), Disp: , Rfl:     ketorolac (TORADOL) 10 mg tablet, Take 1 tablet (10 mg total) by mouth every 6 (six) hours as needed for moderate pain (Patient not taking: Reported on 2/14/2024), Disp: 10 tablet, Rfl: 2    tretinoin (REFISSA) 0.05 % cream, , Disp: , Rfl:     tretinoin (RETIN-A) 0.025 % cream, Apply topically daily at bedtime (Patient not taking: Reported on 2/14/2024), Disp: , Rfl:     Laboratory Results:        Invalid input(s): \"ALBUMIN\"    Results for orders placed or performed during the hospital encounter of 01/14/24   FLU/RSV/COVID - if FLU/RSV clinically relevant    Specimen: Nose; Nares   Result Value Ref Range    SARS-CoV-2 Negative Negative    INFLUENZA A PCR Negative Negative    INFLUENZA B PCR Negative Negative    RSV PCR Negative Negative   Blood culture #1    Specimen: Hand, Left; Blood   Result Value Ref Range    Blood Culture No Growth After 5 Days.    Blood culture #2    Specimen: Hand, Left; Blood   Result Value Ref Range    Blood Culture No Growth After 5 Days.    Urine culture    Specimen: Urine, Clean Catch   Result Value Ref Range    Urine Culture No Growth <1000 cfu/mL    CBC and " differential   Result Value Ref Range    WBC 8.36 4.31 - 10.16 Thousand/uL    RBC 4.88 3.81 - 5.12 Million/uL    Hemoglobin 14.1 11.5 - 15.4 g/dL    Hematocrit 43.0 34.8 - 46.1 %    MCV 88 82 - 98 fL    MCH 28.9 26.8 - 34.3 pg    MCHC 32.8 31.4 - 37.4 g/dL    RDW 13.3 11.6 - 15.1 %    MPV 13.2 (H) 8.9 - 12.7 fL    Platelets 217 149 - 390 Thousands/uL    nRBC 0 /100 WBCs    Neutrophils Relative 60 43 - 75 %    Immat GRANS % 0 0 - 2 %    Lymphocytes Relative 32 14 - 44 %    Monocytes Relative 6 4 - 12 %    Eosinophils Relative 1 0 - 6 %    Basophils Relative 1 0 - 1 %    Neutrophils Absolute 5.00 1.85 - 7.62 Thousands/µL    Immature Grans Absolute 0.02 0.00 - 0.20 Thousand/uL    Lymphocytes Absolute 2.70 0.60 - 4.47 Thousands/µL    Monocytes Absolute 0.53 0.17 - 1.22 Thousand/µL    Eosinophils Absolute 0.07 0.00 - 0.61 Thousand/µL    Basophils Absolute 0.04 0.00 - 0.10 Thousands/µL   Comprehensive metabolic panel   Result Value Ref Range    Sodium 129 (L) 135 - 147 mmol/L    Potassium 4.1 3.5 - 5.3 mmol/L    Chloride 91 (L) 96 - 108 mmol/L    CO2 13 (L) 21 - 32 mmol/L    ANION GAP 25 mmol/L    BUN 15 5 - 25 mg/dL    Creatinine 0.96 0.60 - 1.30 mg/dL    Glucose 747 (HH) 65 - 140 mg/dL    Calcium 9.6 8.4 - 10.2 mg/dL    AST 19 13 - 39 U/L    ALT 18 7 - 52 U/L    Alkaline Phosphatase 75 34 - 104 U/L    Total Protein 7.9 6.4 - 8.4 g/dL    Albumin 4.7 3.5 - 5.0 g/dL    Total Bilirubin 1.11 (H) 0.20 - 1.00 mg/dL    eGFR 81 ml/min/1.73sq m   Beta Hydroxybutyrate   Result Value Ref Range    BETA-HYDROXYBUTYRATE 5.3 (H) <0.6 mmol/L   Blood gas, venous   Result Value Ref Range    pH, Dariel 7.294 (L) 7.300 - 7.400    pCO2, Dariel 28.6 (L) 42.0 - 50.0 mm Hg    pO2, Dariel 46.7 (H) 35.0 - 45.0 mm Hg    HCO3, Dariel 13.6 (L) 24 - 30 mmol/L    Base Excess, Dariel -11.3 mmol/L    O2 Content, Dariel 16.5 ml/dL    O2 HGB, VENOUS 78.2 60.0 - 80.0 %   UA w Reflex to Microscopic w Reflex to Culture    Specimen: Urine, Clean Catch   Result Value Ref Range  "   Color, UA Colorless     Clarity, UA Clear     Specific Gravity, UA 1.027 1.003 - 1.030    pH, UA 5.0 4.5, 5.0, 5.5, 6.0, 6.5, 7.0, 7.5, 8.0    Leukocytes, UA (A) Negative     Elevated glucose may cause decreased leukocyte values. See urine microscopic for UWBC result    Nitrite, UA Negative Negative    Protein, UA Negative Negative mg/dl    Glucose, UA >=1000 (1%) (A) Negative mg/dl    Ketones,  (4+) (A) Negative mg/dl    Urobilinogen, UA <2.0 <2.0 mg/dl mg/dl    Bilirubin, UA Negative Negative    Occult Blood, UA Negative Negative   Magnesium   Result Value Ref Range    Magnesium 1.8 (L) 1.9 - 2.7 mg/dL   Lipase   Result Value Ref Range    Lipase 31 11 - 82 u/L   HS Troponin 0hr (reflex protocol)   Result Value Ref Range    hs TnI 0hr 2 \"Refer to ACS Flowchart\"- see link ng/L   D-Dimer   Result Value Ref Range    D-Dimer, Quant 0.46 <0.50 ug/ml FEU   Protime-INR   Result Value Ref Range    Protime 13.3 11.6 - 14.5 seconds    INR 0.95 0.84 - 1.19   APTT   Result Value Ref Range    PTT 23 23 - 37 seconds   Lactic acid, plasma (w/reflex if result > 2.0)   Result Value Ref Range    LACTIC ACID 2.8 (HH) 0.5 - 2.0 mmol/L   TSH, 3rd generation with Free T4 reflex   Result Value Ref Range    TSH 3RD GENERATON 2.490 0.450 - 4.500 uIU/mL   Lactic acid 2 Hours   Result Value Ref Range    LACTIC ACID 3.2 (HH) 0.5 - 2.0 mmol/L   Urine Microscopic   Result Value Ref Range    RBC, UA 1-2 None Seen, 1-2 /hpf    WBC, UA 1-2 None Seen, 1-2 /hpf    Epithelial Cells None Seen None Seen, Occasional /hpf    Bacteria, UA Occasional None Seen, Occasional /hpf   Basic metabolic panel   Result Value Ref Range    Sodium 135 135 - 147 mmol/L    Potassium 3.5 3.5 - 5.3 mmol/L    Chloride 105 96 - 108 mmol/L    CO2 23 21 - 32 mmol/L    ANION GAP 7 mmol/L    BUN 8 5 - 25 mg/dL    Creatinine 0.59 (L) 0.60 - 1.30 mg/dL    Glucose 207 (H) 65 - 140 mg/dL    Calcium 8.0 (L) 8.4 - 10.2 mg/dL    eGFR 126 ml/min/1.73sq m   Magnesium   Result " Value Ref Range    Magnesium 1.9 1.9 - 2.7 mg/dL   Phosphorus   Result Value Ref Range    Phosphorus 2.4 (L) 2.7 - 4.5 mg/dL   Basic metabolic panel   Result Value Ref Range    Sodium 135 135 - 147 mmol/L    Potassium 3.8 3.5 - 5.3 mmol/L    Chloride 104 96 - 108 mmol/L    CO2 17 (L) 21 - 32 mmol/L    ANION GAP 14 mmol/L    BUN 11 5 - 25 mg/dL    Creatinine 0.77 0.60 - 1.30 mg/dL    Glucose 290 (H) 65 - 140 mg/dL    Calcium 8.6 8.4 - 10.2 mg/dL    eGFR 106 ml/min/1.73sq m   Magnesium   Result Value Ref Range    Magnesium 2.4 1.9 - 2.7 mg/dL   Phosphorus   Result Value Ref Range    Phosphorus 1.1 (L) 2.7 - 4.5 mg/dL   Hemoglobin A1C   Result Value Ref Range    Hemoglobin A1C 9.6 (H) Normal 4.0-5.6%; PreDiabetic 5.7-6.4%; Diabetic >=6.5%; Glycemic control for adults with diabetes <7.0% %     mg/dl   Lactic acid, plasma (w/reflex if result > 2.0)   Result Value Ref Range    LACTIC ACID 1.3 0.5 - 2.0 mmol/L   CBC and differential   Result Value Ref Range    WBC 6.75 4.31 - 10.16 Thousand/uL    RBC 3.84 3.81 - 5.12 Million/uL    Hemoglobin 11.2 (L) 11.5 - 15.4 g/dL    Hematocrit 33.1 (L) 34.8 - 46.1 %    MCV 86 82 - 98 fL    MCH 29.2 26.8 - 34.3 pg    MCHC 33.8 31.4 - 37.4 g/dL    RDW 13.1 11.6 - 15.1 %    MPV 12.3 8.9 - 12.7 fL    Platelets 180 149 - 390 Thousands/uL    nRBC 0 /100 WBCs    Neutrophils Relative 55 43 - 75 %    Immat GRANS % 0 0 - 2 %    Lymphocytes Relative 36 14 - 44 %    Monocytes Relative 8 4 - 12 %    Eosinophils Relative 1 0 - 6 %    Basophils Relative 0 0 - 1 %    Neutrophils Absolute 3.68 1.85 - 7.62 Thousands/µL    Immature Grans Absolute 0.02 0.00 - 0.20 Thousand/uL    Lymphocytes Absolute 2.40 0.60 - 4.47 Thousands/µL    Monocytes Absolute 0.56 0.17 - 1.22 Thousand/µL    Eosinophils Absolute 0.06 0.00 - 0.61 Thousand/µL    Basophils Absolute 0.03 0.00 - 0.10 Thousands/µL   Basic metabolic panel   Result Value Ref Range    Sodium 137 135 - 147 mmol/L    Potassium 3.6 3.5 - 5.3 mmol/L     Chloride 106 96 - 108 mmol/L    CO2 23 21 - 32 mmol/L    ANION GAP 8 mmol/L    BUN 6 5 - 25 mg/dL    Creatinine 0.64 0.60 - 1.30 mg/dL    Glucose 182 (H) 65 - 140 mg/dL    Calcium 8.4 8.4 - 10.2 mg/dL    eGFR 123 ml/min/1.73sq m   Magnesium   Result Value Ref Range    Magnesium 1.8 (L) 1.9 - 2.7 mg/dL   Phosphorus   Result Value Ref Range    Phosphorus 4.7 (H) 2.7 - 4.5 mg/dL   Hepatic function panel   Result Value Ref Range    Total Bilirubin 0.63 0.20 - 1.00 mg/dL    Bilirubin, Direct 0.11 0.00 - 0.20 mg/dL    Alkaline Phosphatase 41 34 - 104 U/L    AST 13 13 - 39 U/L    ALT 12 7 - 52 U/L    Total Protein 5.3 (L) 6.4 - 8.4 g/dL    Albumin 3.2 (L) 3.5 - 5.0 g/dL   CBC and differential   Result Value Ref Range    WBC 5.62 4.31 - 10.16 Thousand/uL    RBC 4.09 3.81 - 5.12 Million/uL    Hemoglobin 11.9 11.5 - 15.4 g/dL    Hematocrit 35.9 34.8 - 46.1 %    MCV 88 82 - 98 fL    MCH 29.1 26.8 - 34.3 pg    MCHC 33.1 31.4 - 37.4 g/dL    RDW 13.3 11.6 - 15.1 %    MPV 12.7 8.9 - 12.7 fL    Platelets 180 149 - 390 Thousands/uL    nRBC 0 /100 WBCs    Neutrophils Relative 45 43 - 75 %    Immat GRANS % 0 0 - 2 %    Lymphocytes Relative 45 (H) 14 - 44 %    Monocytes Relative 8 4 - 12 %    Eosinophils Relative 1 0 - 6 %    Basophils Relative 1 0 - 1 %    Neutrophils Absolute 2.52 1.85 - 7.62 Thousands/µL    Immature Grans Absolute 0.02 0.00 - 0.20 Thousand/uL    Lymphocytes Absolute 2.52 0.60 - 4.47 Thousands/µL    Monocytes Absolute 0.47 0.17 - 1.22 Thousand/µL    Eosinophils Absolute 0.06 0.00 - 0.61 Thousand/µL    Basophils Absolute 0.03 0.00 - 0.10 Thousands/µL   Comprehensive metabolic panel   Result Value Ref Range    Sodium 138 135 - 147 mmol/L    Potassium 3.5 3.5 - 5.3 mmol/L    Chloride 105 96 - 108 mmol/L    CO2 22 21 - 32 mmol/L    ANION GAP 11 mmol/L    BUN 6 5 - 25 mg/dL    Creatinine 0.75 0.60 - 1.30 mg/dL    Glucose 181 (H) 65 - 140 mg/dL    Calcium 8.5 8.4 - 10.2 mg/dL    Corrected Calcium 9.0 8.3 - 10.1 mg/dL     AST 16 13 - 39 U/L    ALT 12 7 - 52 U/L    Alkaline Phosphatase 42 34 - 104 U/L    Total Protein 5.7 (L) 6.4 - 8.4 g/dL    Albumin 3.4 (L) 3.5 - 5.0 g/dL    Total Bilirubin 0.77 0.20 - 1.00 mg/dL    eGFR 110 ml/min/1.73sq m   Magnesium   Result Value Ref Range    Magnesium 1.9 1.9 - 2.7 mg/dL   Phosphorus   Result Value Ref Range    Phosphorus 3.7 2.7 - 4.5 mg/dL   POCT pregnancy, urine   Result Value Ref Range    EXT Preg Test, Ur Negative     Control Valid    ECG 12 lead   Result Value Ref Range    Ventricular Rate 108 BPM    Atrial Rate 108 BPM    ME Interval 134 ms    QRSD Interval 82 ms    QT Interval 342 ms    QTC Interval 458 ms    P Axis 68 degrees    QRS Axis 46 degrees    T Wave Paige 58 degrees   ECG 12 lead   Result Value Ref Range    Ventricular Rate 77 BPM    Atrial Rate 77 BPM    ME Interval 144 ms    QRSD Interval 82 ms    QT Interval 382 ms    QTC Interval 432 ms    P Paige 63 degrees    QRS Axis 59 degrees    T Wave Axis 45 degrees   Fingerstick Glucose (POCT)   Result Value Ref Range    POC Glucose >500 (HH) 65 - 140 mg/dl   Fingerstick Glucose (POCT)   Result Value Ref Range    POC Glucose 423 (H) 65 - 140 mg/dl   Fingerstick Glucose (POCT)   Result Value Ref Range    POC Glucose 375 (H) 65 - 140 mg/dl   Fingerstick Glucose (POCT)   Result Value Ref Range    POC Glucose 251 (H) 65 - 140 mg/dl   Fingerstick Glucose (POCT)   Result Value Ref Range    POC Glucose 201 (H) 65 - 140 mg/dl   Fingerstick Glucose (POCT)   Result Value Ref Range    POC Glucose 211 (H) 65 - 140 mg/dl   Fingerstick Glucose (POCT)   Result Value Ref Range    POC Glucose 183 (H) 65 - 140 mg/dl   Fingerstick Glucose (POCT)   Result Value Ref Range    POC Glucose 185 (H) 65 - 140 mg/dl   Fingerstick Glucose (POCT)   Result Value Ref Range    POC Glucose 181 (H) 65 - 140 mg/dl   Fingerstick Glucose (POCT)   Result Value Ref Range    POC Glucose 185 (H) 65 - 140 mg/dl   Fingerstick Glucose (POCT)   Result Value Ref Range    POC  Glucose 207 (H) 65 - 140 mg/dl   Fingerstick Glucose (POCT)   Result Value Ref Range    POC Glucose 170 (H) 65 - 140 mg/dl   Fingerstick Glucose (POCT)   Result Value Ref Range    POC Glucose 213 (H) 65 - 140 mg/dl   Fingerstick Glucose (POCT)   Result Value Ref Range    POC Glucose 198 (H) 65 - 140 mg/dl   Fingerstick Glucose (POCT)   Result Value Ref Range    POC Glucose 189 (H) 65 - 140 mg/dl   Fingerstick Glucose (POCT)   Result Value Ref Range    POC Glucose 90 65 - 140 mg/dl   Fingerstick Glucose (POCT)   Result Value Ref Range    POC Glucose 344 (H) 65 - 140 mg/dl   Fingerstick Glucose (POCT)   Result Value Ref Range    POC Glucose 338 (H) 65 - 140 mg/dl   Fingerstick Glucose (POCT)   Result Value Ref Range    POC Glucose 231 (H) 65 - 140 mg/dl   Fingerstick Glucose (POCT)   Result Value Ref Range    POC Glucose 138 65 - 140 mg/dl   Fingerstick Glucose (POCT)   Result Value Ref Range    POC Glucose 74 65 - 140 mg/dl   Fingerstick Glucose (POCT)   Result Value Ref Range    POC Glucose 227 (H) 65 - 140 mg/dl   Fingerstick Glucose (POCT)   Result Value Ref Range    POC Glucose 115 65 - 140 mg/dl   Fingerstick Glucose (POCT)   Result Value Ref Range    POC Glucose 296 (H) 65 - 140 mg/dl   Fingerstick Glucose (POCT)   Result Value Ref Range    POC Glucose 323 (H) 65 - 140 mg/dl   Fingerstick Glucose (POCT)   Result Value Ref Range    POC Glucose 307 (H) 65 - 140 mg/dl   Fingerstick Glucose (POCT)   Result Value Ref Range    POC Glucose 171 (H) 65 - 140 mg/dl   Fingerstick Glucose (POCT)   Result Value Ref Range    POC Glucose 93 65 - 140 mg/dl     *Note: Due to a large number of results and/or encounters for the requested time period, some results have not been displayed. A complete set of results can be found in Results Review.

## 2024-02-14 NOTE — PATIENT INSTRUCTIONS
Resume T slim in afternoon today  Follow with podiatry for foot exam, provided referral  Follow with diabetic ed for pump upgrade

## 2024-02-14 NOTE — ASSESSMENT & PLAN NOTE
Reviewed notes from urgent care.  She is on augmentin and not feeling improved so far.  Lungs clear, vitals wnl.  Spastic coughing spell observed; o2 sat after this is 99%.  Will start advair bid, prn benzonatate.  Continue full course of antibiotic.  Pt instructed to call for reevaluation if sx worsen or persist.

## 2024-02-16 ENCOUNTER — SOCIAL WORK (OUTPATIENT)
Dept: BEHAVIORAL/MENTAL HEALTH CLINIC | Facility: CLINIC | Age: 29
End: 2024-02-16
Payer: COMMERCIAL

## 2024-02-16 DIAGNOSIS — F31.62 BIPOLAR DISORDER, CURRENT EPISODE MIXED, MODERATE (HCC): ICD-10-CM

## 2024-02-16 DIAGNOSIS — F41.1 GENERALIZED ANXIETY DISORDER WITH PANIC ATTACKS: ICD-10-CM

## 2024-02-16 DIAGNOSIS — F42.2 MIXED OBSESSIONAL THOUGHTS AND ACTS: Primary | ICD-10-CM

## 2024-02-16 DIAGNOSIS — F41.0 GENERALIZED ANXIETY DISORDER WITH PANIC ATTACKS: ICD-10-CM

## 2024-02-16 DIAGNOSIS — F43.12 CHRONIC POST-TRAUMATIC STRESS DISORDER (PTSD): ICD-10-CM

## 2024-02-16 PROCEDURE — 90837 PSYTX W PT 60 MINUTES: CPT | Performed by: SOCIAL WORKER

## 2024-02-16 NOTE — PSYCH
Behavioral Health Psychotherapy Progress Note    Psychotherapy Provided: Individual Psychotherapy     1. Mixed obsessional thoughts and acts        2. Generalized anxiety disorder with panic attacks        3. Chronic post-traumatic stress disorder (PTSD)        4. Bipolar disorder, current episode mixed, moderate (HCC)            Goals addressed in session: Goal 1 and Goal 2     DATA: Met with Jenny individually.. Multiple stressors - has been sick for 2 weeks, was in DKA prior to honeymoon - IP for 2 days.  Wedding had 'drama' prior to.  A lot of fighting over Chris's parents and their refusal to have their daughter at the wedding 'to save face' due to previous behavior.  Jenny wanted her there for Chris. Chris has multiple friends from around the world come though Jenny's friend from [facility] [place] couldn't due to war over there. Enjoyed though. Shared pictures. Relationship with sister has improved over past few months. Feels good but remains guarded. Chris got a job at [employer] as a  - will be provided a track to finance after he completes degree.  Denied SI  During this session, this clinician used the following therapeutic modalities: Client-centered Therapy, Cognitive Behavioral Therapy, Cognitive Processing Therapy, and Supportive Psychotherapy    Substance Abuse was not addressed during this session. If the client is diagnosed with a co-occurring substance use disorder, please indicate any changes in the frequency or amount of use: . Stage of change for addressing substance use diagnoses: No substance use/Not applicable    ASSESSMENT:  Jenny Rodrigues presents with a Euthymic/ normal and Labile mood.     her affect is Constricted, which is congruent, with her mood and the content of the session. The client has made progress on their goals.     Jenny Rodrigues presents with a low risk of suicide, low risk of self-harm, and low risk of harm to others.    For any risk assessment that  "surpasses a \"low\" rating, a safety plan must be developed.    A safety plan was indicated: no  If yes, describe in detail     PLAN: Between sessions, Jenny Davisquan will monitor sugars, . At the next session, the therapist will use Client-centered Therapy, Cognitive Behavioral Therapy, Cognitive Processing Therapy, and Supportive Psychotherapy to address above topics.    Behavioral Health Treatment Plan and Discharge Planning: Jenny Rodrigues is aware of and agrees to continue to work on their treatment plan. They have identified and are working toward their discharge goals. yes    Visit start and stop times:    02/16/24  Start Time: 1355  Stop Time: 1450  Total Visit Time: 55 minutes  "

## 2024-02-16 NOTE — BH TREATMENT PLAN
Outpatient Behavioral Health Psychotherapy Treatment Plan     Jenny Rodrigues  1995      Date of Initial Psychotherapy Assessment: 8/12/19   Date of Current Treatment Plan: 2/16/24  Treatment Plan Target Date: 8/12/24  Treatment Plan Expiration Date:  TBD     Diagnosis:   1. Mixed obsessional thoughts and acts          2. Generalized anxiety disorder with panic attacks          3. Chronic post-traumatic stress disorder (PTSD)          4. Bipolar disorder, current episode mixed, moderate (HCC)                Area(s) of Need: 'Im a hypochondriac at times', health obstacles, mood fluctuations, panic attacks     Long Term Goal 1 'I will be able to get my health triggered by stress under control'     Stage of Change: Preparation     Target Date for completion:  8/12/24             Anticipated therapeutic modalities: supportive therapy, cognitive processing, DBT             People identified to complete this goal: Evelyne                    Objective 1: Jenny discuss her search for another job in 2/4 session's throughout Tx plan                    Objective 2: Jenny will explore other work from home options through employment Apps 4/7 days until options idenitified            Objective 3: Jenny will monitor Lupus flare ups, DKA, pain  etc due to increased stress 7/7 days throughout Tx plan      Long Term Goal 2  I will process my family relationships including Chris and our marriage     Stage of Change: Action     Target Date for completion: 8/12/24             Anticipated therapeutic modalities:  Supportive therapy, DBT, cognitive processing, TF-CBT             People identified to complete this goal: Evelyne                    Objective 1: Jenny will explore emotions related to distancing herself from sister (not considering her family any longer) 2/4 session's throughout Tx plan                    Objective 2: Jenny will process PTSD triggers/emotions 8/10 sessions until  "end of Tx period     Long Term Goal 3 \"I will get my moods under control'     Stage of Change: Preparation     Target Date for completion: 8/12/24             Anticipated therapeutic modalities: Supportive therapy, DBT, cognitive processing, CBT             People identified to complete this goal:  Jenny and Dr. Gilbert Che                    Objective 1: Jenny will begin taking prescribed meds daily 12/12 weeks until end of Tx period                    Objective 2: Jenny will work to identify as to specific triggers leading to frequent mood fluctuations daily 8/12 weeks until end of Tx period      I am currently under the care of a [facility] psychiatric provider: yes     My [facility] psychiatric provider is: Dr. Marline Victoria     I am currently taking psychiatric medications: Yes, but not as prescribed. (explain) insurance issue with Zoloft     I feel that I will be ready for discharge from mental health care when I reach the following (measurable goal/objective): 'I am such a mess I get anxious thinking about ending therapy'     For children and adults who have a legal guardian:          Has there been any change to custody orders and/or guardianship status? NA. If yes, attach updated documentation.     I have created my Crisis Plan and have been offered a copy of this plan     Behavioral Health Treatment Plan [facility]: Diagnosis and Treatment Plan explained to Jenny Rodrigues acknowledges an understanding of their diagnosis. Jenny Rodrigues agrees to this treatment plan.     I have been offered a copy of this Treatment Plan. yes            "

## 2024-02-16 NOTE — BH CRISIS PLAN
Client Name: Jenny Rodrigues       Client YOB: 1995    [employer] Safety Plan      Creation Date: 2/16/24 Update Date: 2/16/25   Created By: Yane Martinez Last Updated By: Yane Martinez      Step 1: Warning Signs:   Warning Signs   frequent panic attacks   I don't eat   I sleep all the time            Step 2: Internal Coping Strategies:   Internal Coping Strategies   Puzzles   Researching subjects            Step 3: People and social settings that provide distraction:   Name Contact Information   [place] in my phone    Places   Just take a drive           Step 4: People whom I can ask for help during a crisis:      Name Contact Information    Chris in my home - in my phone      Step 5: Professionals or agencies I can contact during a crisis:      Clinican/Agency Name Phone Emergency Contact    Yane Martinez, MARIA GW, CCT[employer]  [phone] 911    Dr. Marline Hines [phone] 911      Local Emergency Department Emergency Department Phone Emergency Department Address    [address]        Crisis Phone Numbers:   Suicide Prevention Lifeline: Call or Text  988 Crisis Text Line: Text HOME to 016-789   Please note: Some [facility] do not have a separate number for Child/Adolescent specific crisis. If your county is not listed under Child/Adolescent, please call the adult number for your county      Adult Crisis Numbers: Child/Adolescent Crisis Numbers   [facility]: [phone] [facility]: [phone]   [facility]: [phone] [facility]: [phone]   [facility]: [phone] [place]: [phone]   [facility]: [phone] [place]: [phone]   [place]: [phone]   [facility]: [phone]   [facility]: [phone]   [facility]: [phone] (daytime) [phone] (after hours, weekends, holidays)      Step 6: Making the environment safer (plan for lethal means safety):    Patient did not identify any lethal methods: Yes     Optional: What is most important to me and worth living for?   I never wanted to die     [facility] Safety Plan. Sharonda Robin and Juan Manuel Triana. Used with permission of the authors.

## 2024-02-20 ENCOUNTER — APPOINTMENT (EMERGENCY)
Dept: RADIOLOGY | Facility: HOSPITAL | Age: 29
End: 2024-02-20
Payer: COMMERCIAL

## 2024-02-20 ENCOUNTER — HOSPITAL ENCOUNTER (EMERGENCY)
Facility: HOSPITAL | Age: 29
Discharge: HOME | End: 2024-02-20
Attending: EMERGENCY MEDICINE
Payer: COMMERCIAL

## 2024-02-20 VITALS
WEIGHT: 137.13 LBS | HEART RATE: 102 BPM | HEIGHT: 61 IN | DIASTOLIC BLOOD PRESSURE: 91 MMHG | TEMPERATURE: 98.3 F | RESPIRATION RATE: 20 BRPM | SYSTOLIC BLOOD PRESSURE: 134 MMHG | OXYGEN SATURATION: 98 % | BODY MASS INDEX: 25.89 KG/M2

## 2024-02-20 DIAGNOSIS — H60.90 OTITIS EXTERNA: Primary | ICD-10-CM

## 2024-02-20 DIAGNOSIS — J06.9 VIRAL URI WITH COUGH: ICD-10-CM

## 2024-02-20 LAB
ALBUMIN SERPL BCP-MCNC: 4.1 G/DL (ref 3.5–5)
ALP SERPL-CCNC: 78 U/L (ref 34–104)
ALT SERPL W P-5'-P-CCNC: 13 U/L (ref 7–52)
ANION GAP SERPL CALCULATED.3IONS-SCNC: 11 MMOL/L
AST SERPL W P-5'-P-CCNC: 33 U/L (ref 13–39)
BASOPHILS # BLD AUTO: 0.05 THOUSANDS/ÂΜL (ref 0–0.1)
BASOPHILS NFR BLD AUTO: 1 % (ref 0–1)
BILIRUB SERPL-MCNC: 0.61 MG/DL (ref 0.2–1)
BUN SERPL-MCNC: 12 MG/DL (ref 5–25)
CALCIUM SERPL-MCNC: 9.4 MG/DL (ref 8.4–10.2)
CHLORIDE SERPL-SCNC: 104 MMOL/L (ref 96–108)
CO2 SERPL-SCNC: 21 MMOL/L (ref 21–32)
CREAT SERPL-MCNC: 0.64 MG/DL (ref 0.6–1.3)
EOSINOPHIL # BLD AUTO: 0.15 THOUSAND/ÂΜL (ref 0–0.61)
EOSINOPHIL NFR BLD AUTO: 2 % (ref 0–6)
ERYTHROCYTE [DISTWIDTH] IN BLOOD BY AUTOMATED COUNT: 12.9 % (ref 11.6–15.1)
EXT PREGNANCY TEST URINE: NEGATIVE
EXT. CONTROL: NORMAL
FLUAV RNA RESP QL NAA+PROBE: NEGATIVE
FLUBV RNA RESP QL NAA+PROBE: NEGATIVE
GFR SERPL CREATININE-BSD FRML MDRD: 122 ML/MIN/1.73SQ M
GLUCOSE SERPL-MCNC: 131 MG/DL (ref 65–140)
HCT VFR BLD AUTO: 40.1 % (ref 34.8–46.1)
HGB BLD-MCNC: 12.9 G/DL (ref 11.5–15.4)
IMM GRANULOCYTES # BLD AUTO: 0.12 THOUSAND/UL (ref 0–0.2)
IMM GRANULOCYTES NFR BLD AUTO: 1 % (ref 0–2)
LIPASE SERPL-CCNC: 27 U/L (ref 11–82)
LYMPHOCYTES # BLD AUTO: 3.93 THOUSANDS/ÂΜL (ref 0.6–4.47)
LYMPHOCYTES NFR BLD AUTO: 40 % (ref 14–44)
MCH RBC QN AUTO: 28.7 PG (ref 26.8–34.3)
MCHC RBC AUTO-ENTMCNC: 32.2 G/DL (ref 31.4–37.4)
MCV RBC AUTO: 89 FL (ref 82–98)
MONOCYTES # BLD AUTO: 0.79 THOUSAND/ÂΜL (ref 0.17–1.22)
MONOCYTES NFR BLD AUTO: 8 % (ref 4–12)
NEUTROPHILS # BLD AUTO: 4.84 THOUSANDS/ÂΜL (ref 1.85–7.62)
NEUTS SEG NFR BLD AUTO: 48 % (ref 43–75)
NRBC BLD AUTO-RTO: 0 /100 WBCS
PLATELET # BLD AUTO: 194 THOUSANDS/UL (ref 149–390)
PMV BLD AUTO: 12 FL (ref 8.9–12.7)
POTASSIUM SERPL-SCNC: 4.4 MMOL/L (ref 3.5–5.3)
PROT SERPL-MCNC: 7.4 G/DL (ref 6.4–8.4)
RBC # BLD AUTO: 4.49 MILLION/UL (ref 3.81–5.12)
RSV RNA RESP QL NAA+PROBE: NEGATIVE
S PYO DNA THROAT QL NAA+PROBE: NOT DETECTED
SARS-COV-2 RNA RESP QL NAA+PROBE: NEGATIVE
SODIUM SERPL-SCNC: 136 MMOL/L (ref 135–147)
WBC # BLD AUTO: 9.88 THOUSAND/UL (ref 4.31–10.16)

## 2024-02-20 PROCEDURE — 96361 HYDRATE IV INFUSION ADD-ON: CPT

## 2024-02-20 PROCEDURE — 81025 URINE PREGNANCY TEST: CPT | Performed by: EMERGENCY MEDICINE

## 2024-02-20 PROCEDURE — 99284 EMERGENCY DEPT VISIT MOD MDM: CPT

## 2024-02-20 PROCEDURE — 36415 COLL VENOUS BLD VENIPUNCTURE: CPT | Performed by: EMERGENCY MEDICINE

## 2024-02-20 PROCEDURE — 85025 COMPLETE CBC W/AUTO DIFF WBC: CPT | Performed by: EMERGENCY MEDICINE

## 2024-02-20 PROCEDURE — 99285 EMERGENCY DEPT VISIT HI MDM: CPT | Performed by: EMERGENCY MEDICINE

## 2024-02-20 PROCEDURE — 96375 TX/PRO/DX INJ NEW DRUG ADDON: CPT

## 2024-02-20 PROCEDURE — 71046 X-RAY EXAM CHEST 2 VIEWS: CPT

## 2024-02-20 PROCEDURE — 80053 COMPREHEN METABOLIC PANEL: CPT | Performed by: EMERGENCY MEDICINE

## 2024-02-20 PROCEDURE — 83690 ASSAY OF LIPASE: CPT | Performed by: EMERGENCY MEDICINE

## 2024-02-20 PROCEDURE — 87651 STREP A DNA AMP PROBE: CPT

## 2024-02-20 PROCEDURE — 96374 THER/PROPH/DIAG INJ IV PUSH: CPT

## 2024-02-20 PROCEDURE — 0241U HB NFCT DS VIR RESP RNA 4 TRGT: CPT

## 2024-02-20 RX ORDER — CIPROFLOXACIN AND DEXAMETHASONE 3; 1 MG/ML; MG/ML
4 SUSPENSION/ DROPS AURICULAR (OTIC) 2 TIMES DAILY
Qty: 7.5 ML | Refills: 0 | Status: SHIPPED | OUTPATIENT
Start: 2024-02-20

## 2024-02-20 RX ORDER — LIDOCAINE HYDROCHLORIDE 40 MG/ML
1 SOLUTION TOPICAL ONCE
Status: DISCONTINUED | OUTPATIENT
Start: 2024-02-20 | End: 2024-02-20

## 2024-02-20 RX ORDER — ONDANSETRON 2 MG/ML
4 INJECTION INTRAMUSCULAR; INTRAVENOUS ONCE
Status: COMPLETED | OUTPATIENT
Start: 2024-02-20 | End: 2024-02-20

## 2024-02-20 RX ORDER — LIDOCAINE HYDROCHLORIDE 20 MG/ML
15 SOLUTION OROPHARYNGEAL ONCE
Status: DISCONTINUED | OUTPATIENT
Start: 2024-02-20 | End: 2024-02-20 | Stop reason: HOSPADM

## 2024-02-20 RX ORDER — GUAIFENESIN/DEXTROMETHORPHAN 100-10MG/5
10 SYRUP ORAL ONCE
Status: COMPLETED | OUTPATIENT
Start: 2024-02-20 | End: 2024-02-20

## 2024-02-20 RX ORDER — ACETAMINOPHEN 10 MG/ML
1000 INJECTION, SOLUTION INTRAVENOUS ONCE
Status: COMPLETED | OUTPATIENT
Start: 2024-02-20 | End: 2024-02-20

## 2024-02-20 RX ADMIN — SODIUM CHLORIDE 1000 ML: 0.9 INJECTION, SOLUTION INTRAVENOUS at 05:33

## 2024-02-20 RX ADMIN — GUAIFENESIN AND DEXTROMETHORPHAN 10 ML: 100; 10 SYRUP ORAL at 05:23

## 2024-02-20 RX ADMIN — ONDANSETRON 4 MG: 2 INJECTION INTRAMUSCULAR; INTRAVENOUS at 05:40

## 2024-02-20 RX ADMIN — ACETAMINOPHEN 1000 MG: 10 INJECTION INTRAVENOUS at 05:33

## 2024-02-20 NOTE — ED ATTENDING ATTESTATION
2/20/2024  I, Kiko Andrade MD, saw and evaluated the patient. I have discussed the patient with the resident/non-physician practitioner and agree with the resident's/non-physician practitioner's findings, Plan of Care, and MDM as documented in the resident's/non-physician practitioner's note, except where noted. All available labs and Radiology studies were reviewed.  I was present for key portions of any procedure(s) performed by the resident/non-physician practitioner and I was immediately available to provide assistance.       At this point I agree with the current assessment done in the Emergency Department.  I have conducted an independent evaluation of this patient a history and physical is as follows: Left ear pain, congestion, elevated blood sugars at home.    Labs unremarkable, physical exam largely unremarkable, no evidence of DKA.  Chest x-ray with no acute cardiopulmonary disease identified on independent evaluation.    Patient stable for discharge home with outpatient management.    Results Reviewed       Procedure Component Value Units Date/Time    Comprehensive metabolic panel [[id number]] Collected: 02/20/24 0530    Lab Status: Final result Specimen: Blood from Arm, Right Updated: 02/20/24 0603     Sodium 136 mmol/L      Potassium 4.4 mmol/L      Chloride 104 mmol/L      CO2 21 mmol/L      ANION GAP 11 mmol/L      BUN 12 mg/dL      Creatinine 0.64 mg/dL      Glucose 131 mg/dL      Calcium 9.4 mg/dL      AST 33 U/L      ALT 13 U/L      Alkaline Phosphatase 78 U/L      Total Protein 7.4 g/dL      Albumin 4.1 g/dL      Total Bilirubin 0.61 mg/dL      eGFR 122 ml/min/1.73sq m     Narrative:      National Kidney Disease Foundation guidelines for Chronic Kidney Disease (CKD):     Stage 1 with normal or high GFR (GFR > 90 mL/min/1.73 square meters)    Stage 2 Mild CKD (GFR = 60-89 mL/min/1.73 square meters)    Stage 3A Moderate CKD (GFR = 45-59 mL/min/1.73 square meters)    Stage 3B Moderate CKD  (GFR = 30-44 mL/min/1.73 square meters)    Stage 4 Severe CKD (GFR = 15-29 mL/min/1.73 square meters)    Stage 5 End Stage CKD (GFR <15 mL/min/1.73 square meters)  Note: GFR calculation is accurate only with a steady state creatinine    Lipase [[id number]]  (Normal) Collected: 02/20/24 0530    Lab Status: Final result Specimen: Blood from Arm, Right Updated: 02/20/24 0603     Lipase 27 u/L     FLU/RSV/COVID - if FLU/RSV clinically relevant [[id number]]  (Normal) Collected: 02/20/24 0508    Lab Status: Final result Specimen: Nares from Nose Updated: 02/20/24 0559     SARS-CoV-2 Negative     INFLUENZA A PCR Negative     INFLUENZA B PCR Negative     RSV PCR Negative    Narrative:      FOR PEDIATRIC PATIENTS - copy/paste COVID Guidelines URL to browser: [email or web address]    SARS-CoV-2 assay is a Nucleic Acid Amplification assay intended for the  qualitative detection of nucleic acid from SARS-CoV-2 in nasopharyngeal  swabs. Results are for the presumptive identification of SARS-CoV-2 RNA.    Positive results are indicative of infection with SARS-CoV-2, the virus  causing COVID-19, but do not rule out bacterial infection or co-infection  with other viruses. Laboratories within the [place] and its  territories are required to report all positive results to the appropriate  public health authorities. Negative results do not preclude SARS-CoV-2  infection and should not be used as the sole basis for treatment or other  patient management decisions. Negative results must be combined with  clinical observations, patient history, and epidemiological information.  This test has not been FDA cleared or approved.    This test has been authorized by FDA under an Emergency Use Authorization  (EUA). This test is only authorized for the duration of time the  declaration that circumstances exist justifying the authorization of the  emergency use of an in vitro diagnostic  tests for detection of SARS-CoV-2  virus and/or diagnosis of COVID-19 infection under section 564(b)(1) of  the Act, 21 U.S.C. 360bbb-3(b)(1), unless the authorization is terminated  or revoked sooner. The test has been validated but independent review by FDA  and CLIA is pending.    Test performed using [employer] GeneXpert: This RT-PCR assay targets N2,  a region unique to SARS-CoV-2. A conserved region in the E-gene was chosen  for pan-Sarbecovirus detection which includes SARS-CoV-2.    According to CMS-2020-01-R, this platform meets the definition of high-throughput technology.    Strep A PCR [[id number]]  (Normal) Collected: 02/20/24 0508    Lab Status: Final result Specimen: Throat Updated: 02/20/24 0546     STREP A PCR Not Detected    CBC and differential [[id number]] Collected: 02/20/24 0530    Lab Status: Final result Specimen: Blood from Arm, Right Updated: 02/20/24 0543     WBC 9.88 Thousand/uL      RBC 4.49 Million/uL      Hemoglobin 12.9 g/dL      Hematocrit 40.1 %      MCV 89 fL      MCH 28.7 pg      MCHC 32.2 g/dL      RDW 12.9 %      MPV 12.0 fL      Platelets 194 Thousands/uL      nRBC 0 /100 WBCs      Neutrophils Relative 48 %      Immat GRANS % 1 %      Lymphocytes Relative 40 %      Monocytes Relative 8 %      Eosinophils Relative 2 %      Basophils Relative 1 %      Neutrophils Absolute 4.84 Thousands/µL      Immature Grans Absolute 0.12 Thousand/uL      Lymphocytes Absolute 3.93 Thousands/µL      Monocytes Absolute 0.79 Thousand/µL      Eosinophils Absolute 0.15 Thousand/µL      Basophils Absolute 0.05 Thousands/µL     POCT pregnancy, urine [[id number]]  (Normal) Resulted: 02/20/24 0540    Lab Status: Final result Updated: 02/20/24 0540     EXT Preg Test, Ur Negative     Control Valid          XR chest 2 views   ED Interpretation by Scott Aden MD (02/20 0632)   No acute cardio-pulmonary disease.  Independently interpreted by myself.              ED Course         Critical Care  Time  Procedures

## 2024-02-20 NOTE — ED PROVIDER NOTES
"History  Chief Complaint   Patient presents with    Flu Symptoms     Left Ear pain started last night. Just finished 1 week rx for amox for sinus infection. Also cough and body aches. Cough ongoing since feb 1st     (Jenny Rodrigues) Jenny Rodrigues is a 29 y.o. female     They presented to the emergency department on February 20, 2024. Patient presents with:  Flu Symptoms: Left Ear pain started last night. Just finished 1 week rx for amox for sinus infection. Also cough and body aches. Cough ongoing since feb 1st.    The patient states that she has been experiencing pain since the beginning of February, was diagnosed with sinusitis and just finished a prescription of amoxicillin.  Patient notes that she began last night with worsening left ear pain for which she attempted to use Motrin however did not have any improvement.  Patient also notes that she has been experiencing a cough with productive green/brown sputum also since the beginning of February.  Patient notes that she has had fevers that she has been able to control with antipyretics.  Patient notes she is a type I diabetic, has an insulin pump, and has had to give herself more insulin due to her sugars increasing while being sick.  Patient notes she has been in DKA previously, however does not feel as if she is in DKA at this time.  Patient denies abdominal pain, nausea, vomiting, change in urination, or any other complaint at this time.              Prior to Admission Medications   Prescriptions Last Dose Informant Patient Reported? Taking?   BD Insulin Syringe U/F 31G X 5/16\" 0.5 ML MISC  Self No No   Sig: Use as directly with weekly methotrexate injection.   Belimumab (BENLYSTA IV)  Self Yes No   Sig: Inject into a catheter in a vein every 14 (fourteen) days Switching to monthly on 12/5   Cyanocobalamin 1000 MCG SUBL   No No   Sig: Place 1 tablet (1,000 mcg total) under the tongue daily   Fluticasone-Salmeterol (Advair) 250-50 mcg/dose inhaler   No No "   Sig: Inhale 1 puff 2 (two) times a day Rinse mouth after use.   Glucagon (Gvoke HypoPen 2-Pack) 1 MG/0.2ML SOAJ   No No   Si mg PRN for severe hypoglycemia   Glucagon HCl (Glucagon Emergency) 1 MG/ML SOLR  Self Yes No   Sig: INJECT 1 MG AS DIRECTED AS NEEDED (WHEN PASSED OUT FROM LOW BLOOD SUGAR).   Patient not taking: Reported on 2024   Insulin Disposable Pump (Omnipod 5 G6 Pod, Gen 5,) MISC  Self Yes No   Sig: INJECT 1 EACH UNDER THE SKIN EVERY THIRD DAY. E10.65   Patient not taking: Reported on 2024   Insulin Pen Needle (BD PEN NEEDLE NASIM U/F) 32G X 4 MM MISC  Self No No   Sig: by Does not apply route 4 (four) times a day for 180 days   LORazepam (Ativan) 0.5 mg tablet  Self No No   Sig: Take 1 tablet (0.5 mg total) by mouth 2 (two) times a day as needed for anxiety   NovoLOG 100 UNIT/ML injection   No No   Sig: Up to 100 units per day with insulin pump   OneTouch Verio test strip  Self Yes No   Sig: USE 4 TIMES A DAY BEFORE MEALS AND AT BEDTIME   Tresiba FlexTouch 100 units/mL injection pen  Self No No   Sig: Inject 30 Units under the skin daily   amoxicillin-clavulanate (AUGMENTIN) 400-57 mg/5 mL suspension   No No   Sig: Take 10 mL by mouth 2 (two) times a day for 7 days   benzonatate (TESSALON PERLES) 100 mg capsule   No No   Sig: Take 1 capsule (100 mg total) by mouth 3 (three) times a day as needed for cough   clindamycin (CLEOCIN T) 1 % lotion  Self Yes No   Sig: Apply 1 application. topically 2 (two) times a day   divalproex sodium (Depakote ER) 250 mg 24 hr tablet   No No   Sig: Take 2 tabs for 3 days, then 1 tab for 2 days   Patient not taking: Reported on 2024   folic acid (FOLVITE) 1 mg tablet  Self No No   Sig: Take 1 tablet (1 mg total) by mouth daily   gabapentin (Neurontin) 300 mg capsule  Self No No   Sig: Take 1 capsule (300 mg total) by mouth daily at bedtime PRN   glucagon 1 MG injection  Self Yes No   Si mg   hydroxychloroquine (PLAQUENIL) 200 mg tablet  Self No No    Sig: Take 1 tablet (200 mg total) by mouth 2 (two) times a day with meals   insulin lispro (HumaLOG) 100 units/mL injection  Self No No   Sig: Inject 15 Units under the skin 3 (three) times a day before meals   Patient not taking: Reported on 2/14/2024   ketoconazole (NIZORAL) 2 % shampoo  Self Yes No   Sig: Apply topically once   Patient not taking: Reported on 1/26/2024   ketorolac (TORADOL) 10 mg tablet   No No   Sig: Take 1 tablet (10 mg total) by mouth every 6 (six) hours as needed for moderate pain   Patient not taking: Reported on 2/14/2024   levonorgestrel-ethinyl estradiol (NORDETTE) 0.15-30 MG-MCG per tablet  Self Yes No   Sig: Take 1 tablet by mouth daily   levothyroxine 25 mcg tablet  Self No No   Sig: Take 1 tablet (25 mcg total) by mouth daily   metFORMIN (GLUCOPHAGE-XR) 500 mg 24 hr tablet   No No   Sig: Take 1 tablet (500 mg total) by mouth daily with dinner   methotrexate 50 MG/2ML injection  Self No No   Sig: Inject 0.8 mL (20 mg total) under the skin once a week   miconazole 2 % cream  Self Yes No   Sig: Apply 1 Application topically 2 (two) times a day To affected area   sodium chloride 1 g tablet   No No   Sig: Take 1 tablet (1 g total) by mouth if needed (for dizziness an lightheadendess in setting of low BP)   tretinoin (REFISSA) 0.05 % cream  Self Yes No   Patient not taking: Reported on 2/14/2024   tretinoin (RETIN-A) 0.025 % cream  Self Yes No   Sig: Apply topically daily at bedtime   Patient not taking: Reported on 2/14/2024      Facility-Administered Medications: None       Past Medical History:   Diagnosis Date    Abdominal pain     Anaphylaxis     Anxiety     Anxiety and depression     COVID-19 12/2020    Delayed emergence from anesthesia 03/23/2023    Severe episode of emergence delirium (confused, combative) after MAC anesthetic on 03/2023 for EGD. Received versed 2mg, >50mcg precedex, 5mg IV haldol, and 50mcg fentanyl. Waxing and waning episodes of agitation. Any future anesthetics  should NOT be done at [facility].    Delirium, induced by drug     anesthesia    Depression     Diabetes mellitus (HCC)     Disease of thyroid gland     Hashimoto    DKA, type 1 (HCC) 05/11/2021    Eating disorder     history of anorexia/bulemia [id number]    Food intolerance     Fracture of fibula     R Salter I    Gilbert disease     Hashimoto's disease 02/21/2020    Head injury     Headache(784.0)     Nasal congestion     PTSD (post-traumatic stress disorder)     Rectal bleed     Seizures (HCC)     Type 1 diabetes (HCC)        Past Surgical History:   Procedure Laterality Date    COLONOSCOPY      KNEE SURGERY Right 07/06/2020    NASAL SEPTOPLASTY W/ TURBINOPLASTY      SINUS SURGERY      SKIN BIOPSY      TURBINOPLASTY N/A 3/22/2021    Procedure: TURBINOPLASTY;  Surgeon: Jn Hidalgo MD;  Location: [facility];  Service: ENT    UPPER GASTROINTESTINAL ENDOSCOPY      WISDOM TOOTH EXTRACTION      WRIST SURGERY      left; Excision of ganglion       Family History   Problem Relation Age of Onset    Hypertension Mother     Migraines Mother         Headache    Diabetes type II Mother     Varicose Veins Mother     Hyperlipidemia Mother     Diabetes Mother     Arthritis Mother     Depression Mother     Hearing loss Mother     Anxiety disorder Mother     Eczema Father     Cholelithiasis Father     Hypertension Father     Sarcoidosis Father         Liver    Hyperlipidemia Father     Diabetes Father     Coronary artery disease Father     Nephrolithiasis Father     Cirrhosis Father     Alcohol abuse Father     Thyroid disease Sister     Hashimoto's thyroiditis Sister     Alcohol abuse Brother     Cancer Family     Diabetes Family     Hypertension Family      I have reviewed and agree with the history as documented.    E-Cigarette/Vaping    E-Cigarette Use Never User      E-Cigarette/Vaping Substances    Nicotine No     THC No     CBD No     Flavoring No     Other No     Unknown No      Social History     Tobacco Use    Smoking  status: Never     Passive exposure: Never    Smokeless tobacco: Never    Tobacco comments:     Tobacco smoke exposure (Father smokes cigars)   Vaping Use    Vaping status: Never Used   Substance Use Topics    Alcohol use: Yes     Comment: rarely    Drug use: Never        Review of Systems   Constitutional:  Positive for fever. Negative for chills.   HENT:  Positive for ear pain. Negative for sore throat.    Eyes:  Negative for pain and visual disturbance.   Respiratory:  Positive for cough, chest tightness and shortness of breath.    Cardiovascular:  Negative for chest pain and palpitations.   Gastrointestinal:  Positive for diarrhea. Negative for abdominal pain, nausea and vomiting.   Genitourinary:  Negative for dysuria and hematuria.   Musculoskeletal:  Negative for arthralgias and back pain.   Skin:  Negative for color change and rash.   Neurological:  Negative for seizures and syncope.   All other systems reviewed and are negative.      Physical Exam  ED Triage Vitals   Temperature Pulse Respirations Blood Pressure SpO2   02/20/24 0511 02/20/24 0507 02/20/24 0507 02/20/24 0507 02/20/24 0507   98.3 °F (36.8 °C) 97 20 134/91 97 %      Temp Source Heart Rate Source Patient Position - Orthostatic VS BP Location FiO2 (%)   02/20/24 0511 02/20/24 0507 02/20/24 0507 02/20/24 0507 --   Oral Monitor Sitting Right arm       Pain Score       --                    Orthostatic Vital Signs  Vitals:    02/20/24 0507 02/20/24 0515   BP: 134/91 134/91   Pulse: 97 102   Patient Position - Orthostatic VS: Sitting        Physical Exam  Vitals and nursing note reviewed.   Constitutional:       General: She is in acute distress (Moderate, tearful).      Appearance: Normal appearance.   HENT:      Head: Normocephalic and atraumatic.      Right Ear: Tympanic membrane, ear canal and external ear normal.      Left Ear: Tympanic membrane and external ear normal.      Ears:      Comments: Tenderness with movement of the left pinna as well  as tragal tenderness to palpation, mild erythema of the EAC, no vesicles     Nose: Nose normal.      Mouth/Throat:      Mouth: Mucous membranes are moist.   Eyes:      Conjunctiva/sclera: Conjunctivae normal.   Cardiovascular:      Rate and Rhythm: Normal rate and regular rhythm.   Pulmonary:      Effort: Pulmonary effort is normal. No respiratory distress.      Breath sounds: Normal breath sounds. No stridor. No wheezing, rhonchi or rales.      Comments: Audible, nonproductive cough  Chest:      Chest wall: No tenderness.   Abdominal:      General: Abdomen is flat. Bowel sounds are normal.      Tenderness: There is no abdominal tenderness. There is no guarding or rebound.   Musculoskeletal:         General: Normal range of motion.      Cervical back: Normal range of motion.   Skin:     General: Skin is warm and dry.      Capillary Refill: Capillary refill takes less than 2 seconds.   Neurological:      Mental Status: She is alert. Mental status is at baseline.   Psychiatric:         Mood and Affect: Mood normal.         ED Medications  Medications   Lidocaine Viscous HCl (XYLOCAINE) 2 % mucosal solution 15 mL (has no administration in time range)   sodium chloride 0.9 % bolus 1,000 mL (0 mL Intravenous Stopped 2/20/24 0633)   acetaminophen (Ofirmev) injection 1,000 mg (0 mg Intravenous Stopped 2/20/24 0548)   dextromethorphan-guaiFENesin (ROBITUSSIN DM) oral syrup 10 mL (10 mL Oral Given 2/20/24 0523)   ondansetron (ZOFRAN) injection 4 mg (4 mg Intravenous Given 2/20/24 0540)       Diagnostic Studies  Results Reviewed       Procedure Component Value Units Date/Time    Comprehensive metabolic panel [[id number]] Collected: 02/20/24 0530    Lab Status: Final result Specimen: Blood from Arm, Right Updated: 02/20/24 0603     Sodium 136 mmol/L      Potassium 4.4 mmol/L      Chloride 104 mmol/L      CO2 21 mmol/L      ANION GAP 11 mmol/L      BUN 12 mg/dL      Creatinine 0.64 mg/dL      Glucose 131 mg/dL      Calcium 9.4  mg/dL      AST 33 U/L      ALT 13 U/L      Alkaline Phosphatase 78 U/L      Total Protein 7.4 g/dL      Albumin 4.1 g/dL      Total Bilirubin 0.61 mg/dL      eGFR 122 ml/min/1.73sq m     Narrative:      National Kidney Disease Foundation guidelines for Chronic Kidney Disease (CKD):     Stage 1 with normal or high GFR (GFR > 90 mL/min/1.73 square meters)    Stage 2 Mild CKD (GFR = 60-89 mL/min/1.73 square meters)    Stage 3A Moderate CKD (GFR = 45-59 mL/min/1.73 square meters)    Stage 3B Moderate CKD (GFR = 30-44 mL/min/1.73 square meters)    Stage 4 Severe CKD (GFR = 15-29 mL/min/1.73 square meters)    Stage 5 End Stage CKD (GFR <15 mL/min/1.73 square meters)  Note: GFR calculation is accurate only with a steady state creatinine    Lipase [[id number]]  (Normal) Collected: 02/20/24 0530    Lab Status: Final result Specimen: Blood from Arm, Right Updated: 02/20/24 0603     Lipase 27 u/L     FLU/RSV/COVID - if FLU/RSV clinically relevant [[id number]]  (Normal) Collected: 02/20/24 0508    Lab Status: Final result Specimen: Nares from Nose Updated: 02/20/24 0559     SARS-CoV-2 Negative     INFLUENZA A PCR Negative     INFLUENZA B PCR Negative     RSV PCR Negative    Narrative:      FOR PEDIATRIC PATIENTS - copy/paste COVID Guidelines URL to browser: [email or web address]    SARS-CoV-2 assay is a Nucleic Acid Amplification assay intended for the  qualitative detection of nucleic acid from SARS-CoV-2 in nasopharyngeal  swabs. Results are for the presumptive identification of SARS-CoV-2 RNA.    Positive results are indicative of infection with SARS-CoV-2, the virus  causing COVID-19, but do not rule out bacterial infection or co-infection  with other viruses. Laboratories within the [place] and its  territories are required to report all positive results to the appropriate  public health authorities. Negative results do not preclude SARS-CoV-2  infection and should  not be used as the sole basis for treatment or other  patient management decisions. Negative results must be combined with  clinical observations, patient history, and epidemiological information.  This test has not been FDA cleared or approved.    This test has been authorized by FDA under an Emergency Use Authorization  (EUA). This test is only authorized for the duration of time the  declaration that circumstances exist justifying the authorization of the  emergency use of an in vitro diagnostic tests for detection of SARS-CoV-2  virus and/or diagnosis of COVID-19 infection under section 564(b)(1) of  the Act, 21 U.S.C. 360bbb-3(b)(1), unless the authorization is terminated  or revoked sooner. The test has been validated but independent review by FDA  and CLIA is pending.    Test performed using Mobblespert: This RT-PCR assay targets N2,  a region unique to SARS-CoV-2. A conserved region in the E-gene was chosen  for pan-Sarbecovirus detection which includes SARS-CoV-2.    According to CMS-2020-01-R, this platform meets the definition of high-throughput technology.    Strep A PCR [[id number]]  (Normal) Collected: 02/20/24 0508    Lab Status: Final result Specimen: Throat Updated: 02/20/24 0546     STREP A PCR Not Detected    CBC and differential [[id number]] Collected: 02/20/24 0530    Lab Status: Final result Specimen: Blood from Arm, Right Updated: 02/20/24 0543     WBC 9.88 Thousand/uL      RBC 4.49 Million/uL      Hemoglobin 12.9 g/dL      Hematocrit 40.1 %      MCV 89 fL      MCH 28.7 pg      MCHC 32.2 g/dL      RDW 12.9 %      MPV 12.0 fL      Platelets 194 Thousands/uL      nRBC 0 /100 WBCs      Neutrophils Relative 48 %      Immat GRANS % 1 %      Lymphocytes Relative 40 %      Monocytes Relative 8 %      Eosinophils Relative 2 %      Basophils Relative 1 %      Neutrophils Absolute 4.84 Thousands/µL      Immature Grans Absolute 0.12 Thousand/uL      Lymphocytes Absolute 3.93 Thousands/µL       Monocytes Absolute 0.79 Thousand/µL      Eosinophils Absolute 0.15 Thousand/µL      Basophils Absolute 0.05 Thousands/µL     POCT pregnancy, urine [[id number]]  (Normal) Resulted: 02/20/24 0540    Lab Status: Final result Updated: 02/20/24 0540     EXT Preg Test, Ur Negative     Control Valid                   XR chest 2 views   ED Interpretation by Scott Aden MD (02/20 0632)   No acute cardio-pulmonary disease.  Independently interpreted by myself.              Procedures  Procedures      ED Course  ED Course as of 02/20/24 0711   Tue Feb 20, 2024   0545 CBC and differential  Reassuring, within normal limits     0614 Lipase  Reassuring, within normal limits     0614 Comprehensive metabolic panel  Reassuring, within normal limits     0614 Strep A PCR  Reassuring, within normal limits     0615 FLU/RSV/COVID - if FLU/RSV clinically relevant  Reassuring, within normal limits                               SBIRT 20yo+      Flowsheet Row Most Recent Value   Initial Alcohol Screen: US AUDIT-C     1. How often do you have a drink containing alcohol? 0 Filed at: 02/20/2024 0508   2. How many drinks containing alcohol do you have on a typical day you are drinking?  0 Filed at: 02/20/2024 0508   3a. Male UNDER 65: How often do you have five or more drinks on one occasion? 0 Filed at: 02/20/2024 0508   3b. FEMALE Any Age, or MALE 65+: How often do you have 4 or more drinks on one occassion? 0 Filed at: 02/20/2024 0508   Audit-C Score 0 Filed at: 02/20/2024 0508   CHEPE: How many times in the past year have you...    Used an illegal drug or used a prescription medication for non-medical reasons? Never Filed at: 02/20/2024 0508                  Medical Decision Making  29-year-old female presents to the emergency department with complaint of left ear pain as well as cough.  Differential diagnosis includes but is not limited to otitis media, otitis externa, otalgia, viral URI, pneumonia, strep pharyngitis, pregnancy.  Due  "to patient's history and presentation will obtain CBC, CMP, urine pregnancy, as well as viral testing and strep testing.  Chest x-ray also ordered.  Laboratory analysis showed no acute pertinent findings.  Chest x-ray as interpreted myself as above.  Patient was given IV fluids, Tylenol, Zofran, Robitussin, as well as viscous lidocaine with improvement of symptoms.  Patient Rx as below.  Patient appears well, nontoxic, agrees with plan of care at this time.  Answered all questions.  In light of this, patient would benefit from outpatient follow-up.    Amount and/or Complexity of Data Reviewed  Labs: ordered. Decision-making details documented in ED Course.  Radiology: ordered and independent interpretation performed.    Risk  OTC drugs.  Prescription drug management.          Disposition  Final diagnoses:   Otitis externa   Viral URI with cough     Time reflects when diagnosis was documented in both MDM as applicable and the Disposition within this note       Time User Action Codes Description Comment    2/20/2024  6:28 AM Scott Aden [H60.90] Otitis externa     2/20/2024  6:29 AM Scott Aden [J06.9] Viral URI with cough           ED Disposition       ED Disposition   Discharge    Condition   Stable    Date/Time   Tue Feb 20, 2024 0650    Comment   Jenny Rodrigues discharge to home/self care.                   Follow-up Information       Follow up With Specialties Details Why Contact Info    [facility], Nurse Practitioner, Family Medicine   [address] [place] [address]  [phone]              Discharge Medication List as of 2/20/2024  6:50 AM        START taking these medications    Details   ciprofloxacin-dexamethasone (CIPRODEX) otic suspension Administer 4 drops into the left ear 2 (two) times a day, Starting Tue 2/20/2024, Normal           CONTINUE these medications which have NOT CHANGED    Details   BD Insulin Syringe U/F 31G X 5/16\" " 0.5 ML MISC Use as directly with weekly methotrexate injection., Normal      Belimumab (BENLYSTA IV) Inject into a catheter in a vein every 14 (fourteen) days Switching to monthly on 12/5, Historical Med      benzonatate (TESSALON PERLES) 100 mg capsule Take 1 capsule (100 mg total) by mouth 3 (three) times a day as needed for cough, Starting Wed 2/14/2024, Normal      clindamycin (CLEOCIN T) 1 % lotion Apply 1 application. topically 2 (two) times a day, Starting Tue 10/17/2023, Historical Med      Cyanocobalamin 1000 MCG SUBL Place 1 tablet (1,000 mcg total) under the tongue daily, Starting Fri 1/26/2024, Normal      divalproex sodium (Depakote ER) 250 mg 24 hr tablet Take 2 tabs for 3 days, then 1 tab for 2 days, Normal      Fluticasone-Salmeterol (Advair) 250-50 mcg/dose inhaler Inhale 1 puff 2 (two) times a day Rinse mouth after use., Starting Wed 2/14/2024, Normal      folic acid (FOLVITE) 1 mg tablet Take 1 tablet (1 mg total) by mouth daily, Starting Fri 3/31/2023, Normal      gabapentin (Neurontin) 300 mg capsule Take 1 capsule (300 mg total) by mouth daily at bedtime PRN, Starting Wed 2/8/2023, Normal      Glucagon (Gvoke HypoPen 2-Pack) 1 MG/0.2ML SOAJ 1 mg PRN for severe hypoglycemia, Normal      glucagon 1 MG injection 1 mg, Starting Wed 10/12/2022, Historical Med      Glucagon HCl (Glucagon Emergency) 1 MG/ML SOLR INJECT 1 MG AS DIRECTED AS NEEDED (WHEN PASSED OUT FROM LOW BLOOD SUGAR)., Historical Med      hydroxychloroquine (PLAQUENIL) 200 mg tablet Take 1 tablet (200 mg total) by mouth 2 (two) times a day with meals, Starting Fri 3/31/2023, Until Mon 3/25/2024, Normal      Insulin Disposable Pump (Omnipod 5 G6 Pod, Gen 5,) MISC INJECT 1 EACH UNDER THE SKIN EVERY THIRD DAY. E10.65, Historical Med      insulin lispro (HumaLOG) 100 units/mL injection Inject 15 Units under the skin 3 (three) times a day before meals, Starting Tue 1/16/2024, Print      Insulin Pen Needle (BD PEN NEEDLE NASIM U/F) 32G X 4  MM MISC by Does not apply route 4 (four) times a day for 180 days, Starting Wed 6/5/2019, Until Wed 2/14/2024, Normal      ketoconazole (NIZORAL) 2 % shampoo Apply topically once, Starting Tue 10/17/2023, Historical Med      ketorolac (TORADOL) 10 mg tablet Take 1 tablet (10 mg total) by mouth every 6 (six) hours as needed for moderate pain, Starting Fri 1/26/2024, Normal      levonorgestrel-ethinyl estradiol (NORDETTE) 0.15-30 MG-MCG per tablet Take 1 tablet by mouth daily, Historical Med      levothyroxine 25 mcg tablet Take 1 tablet (25 mcg total) by mouth daily, Starting Wed 5/19/2021, Normal      LORazepam (Ativan) 0.5 mg tablet Take 1 tablet (0.5 mg total) by mouth 2 (two) times a day as needed for anxiety, Starting Wed 9/20/2023, Normal      metFORMIN (GLUCOPHAGE-XR) 500 mg 24 hr tablet Take 1 tablet (500 mg total) by mouth daily with dinner, Starting Wed 2/14/2024, Normal      methotrexate 50 MG/2ML injection Inject 0.8 mL (20 mg total) under the skin once a week, Starting Mon 12/18/2023, Normal      miconazole 2 % cream Apply 1 Application topically 2 (two) times a day To affected area, Starting Tue 10/17/2023, Historical Med      NovoLOG 100 UNIT/ML injection Up to 100 units per day with insulin pump, Normal      OneTouch Verio test strip USE 4 TIMES A DAY BEFORE MEALS AND AT BEDTIME, Historical Med      sodium chloride 1 g tablet Take 1 tablet (1 g total) by mouth if needed (for dizziness an lightheadendess in setting of low BP), Starting Wed 2/14/2024, Until Fri 3/15/2024 at 2359, Normal      Tresiba FlexTouch 100 units/mL injection pen Inject 30 Units under the skin daily, Starting Tue 1/16/2024, Print      tretinoin (REFISSA) 0.05 % cream Historical Med      tretinoin (RETIN-A) 0.025 % cream Apply topically daily at bedtime, Starting Tue 10/17/2023, Historical Med           STOP taking these medications       amoxicillin-clavulanate (AUGMENTIN) 400-57 mg/5 mL suspension Comments:   Reason for Stopping:              No discharge procedures on file.    PDMP Review         Value Time User    PDMP Reviewed  Yes 1/14/2024 10:14 PM Jn Borja MD             ED Provider  Attending physically available and evaluated Jenny Rodrigues. I managed the patient along with the ED Attending.    Electronically Signed by           Scott Aden MD  02/20/24 0711

## 2024-02-20 NOTE — DISCHARGE INSTRUCTIONS
Please follow up with your primary care provider concerning your visit today.  Please return to the Emergency Department if you develop any fever that does not improve with Tylenol or Motrin, difficulty breathing, chest pain, vomiting, inability to eat or drink, or for any other concerns.

## 2024-02-21 PROBLEM — E10.10 DIABETIC KETOACIDOSIS ASSOCIATED WITH TYPE 1 DIABETES MELLITUS (HCC): Status: RESOLVED | Noted: 2021-05-11 | Resolved: 2024-02-21

## 2024-02-22 ENCOUNTER — TELEPHONE (OUTPATIENT)
Age: 29
End: 2024-02-22

## 2024-02-22 DIAGNOSIS — Z30.41 ENCOUNTER FOR SURVEILLANCE OF CONTRACEPTIVE PILLS: Primary | ICD-10-CM

## 2024-02-22 PROCEDURE — 87185 SC STD ENZYME DETCJ PER NZM: CPT

## 2024-02-22 PROCEDURE — 87205 SMEAR GRAM STAIN: CPT

## 2024-02-22 PROCEDURE — 87070 CULTURE OTHR SPECIMN AEROBIC: CPT

## 2024-02-22 PROCEDURE — 87077 CULTURE AEROBIC IDENTIFY: CPT

## 2024-02-22 PROCEDURE — 87184 SC STD DISK METHOD PER PLATE: CPT

## 2024-02-22 NOTE — TELEPHONE ENCOUNTER
Patient is calling regarding cancelling an appointment.    The patient saw the ENT this morning     Date/Time: 02/22/2024    Patient was rescheduled: YES [] NO [x]    Patient requesting call back to reschedule: YES [] NO [x]

## 2024-02-22 NOTE — TELEPHONE ENCOUNTER
Patient called stating she needs a new RX for her ocp sent to [facility] pharmacy ,RX  altavera not due until Sept 2024 for annual

## 2024-02-23 ENCOUNTER — SOCIAL WORK (OUTPATIENT)
Dept: BEHAVIORAL/MENTAL HEALTH CLINIC | Facility: CLINIC | Age: 29
End: 2024-02-23
Payer: COMMERCIAL

## 2024-02-23 DIAGNOSIS — F43.12 CHRONIC POST-TRAUMATIC STRESS DISORDER (PTSD): ICD-10-CM

## 2024-02-23 DIAGNOSIS — F41.0 GENERALIZED ANXIETY DISORDER WITH PANIC ATTACKS: ICD-10-CM

## 2024-02-23 DIAGNOSIS — F41.1 GENERALIZED ANXIETY DISORDER WITH PANIC ATTACKS: ICD-10-CM

## 2024-02-23 DIAGNOSIS — F31.62 BIPOLAR DISORDER, CURRENT EPISODE MIXED, MODERATE (HCC): ICD-10-CM

## 2024-02-23 DIAGNOSIS — F42.2 MIXED OBSESSIONAL THOUGHTS AND ACTS: Primary | ICD-10-CM

## 2024-02-23 PROCEDURE — 90837 PSYTX W PT 60 MINUTES: CPT | Performed by: SOCIAL WORKER

## 2024-02-23 NOTE — PSYCH
"Behavioral Health Psychotherapy Progress Note    Psychotherapy Provided: Individual Psychotherapy     1. Mixed obsessional thoughts and acts        2. Generalized anxiety disorder with panic attacks        3. Chronic post-traumatic stress disorder (PTSD)        4. Bipolar disorder, current episode mixed, moderate (HCC)            Goals addressed in session: Goal 1 and Goal 2     DATA: Met with Jenny individually. Discussed recent disagreement with Chris.  Jenny was taken to ED Tues night due to immense ear pain, vomiting blood.   Found inner and outer ear infection .  ENT found infection in sinuses that he is concerned about. Chris commented 'he is numb about my pain'.  Jenny was aware about this but questioned him about marriage, remaining  and if he wan'tt o divorce once he gets his green card.  Chris stated if they separate he will return to [place] as he has no one else in [place]. Completed return to work paperwork together.  Discussed accommodations.  Consent signed.  Dr. Hunt signed. Denied SI  During this session, this clinician used the following therapeutic modalities: Client-centered Therapy, Cognitive Behavioral Therapy, Cognitive Processing Therapy, and Supportive Psychotherapy    Substance Abuse was not addressed during this session. If the client is diagnosed with a co-occurring substance use disorder, please indicate any changes in the frequency or amount of use: . Stage of change for addressing substance use diagnoses: No substance use/Not applicable    ASSESSMENT:  Jenny Rodrigues presents with a Euthymic/ normal mood.     her affect is Normal range and intensity, which is congruent, with her mood and the content of the session. The client has made progress on their goals.     Jenny Rodrigues presents with a low risk of suicide, low risk of self-harm, and low risk of harm to others.    For any risk assessment that surpasses a \"low\" rating, a safety plan must be developed.    A " safety plan was indicated: no  If yes, describe in detail     PLAN: Between sessions, Jenny Rodrigues will assemble paperwork for return to work, . At the next session, the therapist will use Client-centered Therapy, Cognitive Behavioral Therapy, Cognitive Processing Therapy, and Supportive Psychotherapy to address relationship, health, return to work status.    Behavioral Health Treatment Plan and Discharge Planning: Jenny Rodrigues is aware of and agrees to continue to work on their treatment plan. They have identified and are working toward their discharge goals. yes    Visit start and stop times:    02/23/24  Start Time: 1400  Stop Time: 1500  Total Visit Time: 60 minutes

## 2024-02-26 ENCOUNTER — TELEPHONE (OUTPATIENT)
Dept: RHEUMATOLOGY | Facility: CLINIC | Age: 29
End: 2024-02-26

## 2024-02-26 ENCOUNTER — TELEPHONE (OUTPATIENT)
Age: 29
End: 2024-02-26

## 2024-02-26 ENCOUNTER — TELEPHONE (OUTPATIENT)
Dept: PSYCHIATRY | Facility: CLINIC | Age: 29
End: 2024-02-26

## 2024-02-26 DIAGNOSIS — M79.7 FIBROMYALGIA: Primary | ICD-10-CM

## 2024-02-26 RX ORDER — GABAPENTIN 600 MG/1
600 TABLET ORAL DAILY
Qty: 90 TABLET | Refills: 1 | Status: SHIPPED | OUTPATIENT
Start: 2024-02-26

## 2024-02-26 RX ORDER — LEVONORGESTREL AND ETHINYL ESTRADIOL 0.15-0.03
1 KIT ORAL DAILY
Qty: 28 TABLET | Refills: 3 | Status: SHIPPED | OUTPATIENT
Start: 2024-02-26 | End: 2024-06-17

## 2024-02-26 NOTE — TELEPHONE ENCOUNTER
I will send in the gabapentin.  She should only resume the infusions once she has completed the antibiotic course.

## 2024-02-26 NOTE — TELEPHONE ENCOUNTER
Provider brought signed ADINA and paperwork for return to work paperwork and accommodations to the . All were signed and filled out. Writer placed all into Media at Provider's request.    Thank you

## 2024-02-26 NOTE — TELEPHONE ENCOUNTER
Pt calling in to let Dr. Tanner know that the increase in her gabapentin from 300 mg to 600 mg has provided her with some pain relief so she is wondering if Dr. Tanner could send her a new RX for the increased dosage as she is almost out. I was unable to send to medication refill team as Dr. Tanner was not the original prescriber.    Pt prefers the [facility] in [place].        Pt also had questions about her infusion she had to cancel her 2/12 infusion due to being sick and she will be on antibiotics for 3 weeks currently. So she is wondering when she can resume infusions.

## 2024-02-28 ENCOUNTER — TELEMEDICINE (OUTPATIENT)
Dept: PSYCHIATRY | Facility: CLINIC | Age: 29
End: 2024-02-28
Payer: COMMERCIAL

## 2024-02-28 DIAGNOSIS — F31.62 BIPOLAR DISORDER, CURRENT EPISODE MIXED, MODERATE (HCC): Primary | ICD-10-CM

## 2024-02-28 DIAGNOSIS — F43.12 CHRONIC POST-TRAUMATIC STRESS DISORDER (PTSD): ICD-10-CM

## 2024-02-28 DIAGNOSIS — F41.1 GENERALIZED ANXIETY DISORDER WITH PANIC ATTACKS: ICD-10-CM

## 2024-02-28 DIAGNOSIS — F41.0 GENERALIZED ANXIETY DISORDER WITH PANIC ATTACKS: ICD-10-CM

## 2024-02-28 PROCEDURE — 99213 OFFICE O/P EST LOW 20 MIN: CPT | Performed by: PSYCHIATRY & NEUROLOGY

## 2024-02-28 PROCEDURE — 90833 PSYTX W PT W E/M 30 MIN: CPT | Performed by: PSYCHIATRY & NEUROLOGY

## 2024-02-28 NOTE — PSYCH
Virtual Regular Visit    Verification of patient location:    Patient is located at Home in the following state in which I hold an active license [place]      Assessment/Plan:    Problem List Items Addressed This Visit          Other    Bipolar affective disorder (HCC) - Primary    Chronic post-traumatic stress disorder (PTSD)    Generalized anxiety disorder with panic attacks                Reason for visit is   No chief complaint on file.       Encounter provider Marline Ro MD    Provider located at [address] [place] [address] [phone]      Recent Visits  Date Type Provider Dept   02[facility]26[facility]24 [facility]   Showing recent visits within past 7 days and meeting all other requirements  Today's Visits  Date Type Provider Dept   02/28/24 Telemedicine [facility]   Showing today's visits and meeting all other requirements  Future Appointments  No visits were found meeting these conditions.  Showing future appointments within next 150 days and meeting all other requirements       The patient was identified by name and date of birth. Jenny Rodrigues was informed that this is a telemedicine visit and that the visit is being conducted throughthe Epic Embedded platform. She agrees to proceed..  My office door was closed. No one else was in the room.  She acknowledged consent and understanding of privacy and security of the video platform. The patient has agreed to participate and understands they can discontinue the visit at any time.    Patient is aware this is a billable service.     Subjective  Jenny Rodrigues is a 29 y.o. female with Bipolar do  .Since last seen she stated that after she started working the amount of stress has caused her physical and emotional health to decline. She applied for STD and it was approved. She is  due to return next week.  She is considering  LTD or switching to part time employment but she needs medical benefits due to her multiple health issues.  She continues to meet with her counselor on a weekly basis.   She switched Clonazepam to Lorazepam because she needs a medication she can use as needed but that also acts fast when she experiences episodes of panic. She is reporting raising thoughts and insomnia but denies other manic symptoms. She feels Lorazepam works better than Clonazepam and will like to continue current treatment as prescribed.    She had discussion with rheumatologist about starting Duloxetine to help mood and pain. She did not started medication yet.  She stated she became sick while on a cruise during her honeymoon. She has been recovering from sinus, ear infection, and pneumonia.  She is going back to work soon and she stated she has been taking Gabapentin 600 mg at bedtime, and she still takes long to fall asleep but feels her thoughts are not raising at much.   Will continue Lorazepam prn and will schedule follow up in 6-8 weeks.        HPI     Past Medical History:   Diagnosis Date    Abdominal pain     Anaphylaxis     Anxiety     Anxiety and depression     COVID-19 12/2020    Delayed emergence from anesthesia 03/23/2023    Severe episode of emergence delirium (confused, combative) after MAC anesthetic on 03/2023 for EGD. Received versed 2mg, >50mcg precedex, 5mg IV haldol, and 50mcg fentanyl. Waxing and waning episodes of agitation. Any future anesthetics should NOT be done at [facility].    Delirium, induced by drug     anesthesia    Depression     Diabetes mellitus (HCC)     Disease of thyroid gland     Hashimoto    DKA, type 1 (HCC) 05/11/2021    Eating disorder     history of anorexia/bulemia [id number]    Food intolerance     Fracture of fibula     R Salter I    Gilbert disease     Hashimoto's disease 02/21/2020    Head injury     Headache(784.0)     Nasal congestion     PTSD  "(post-traumatic stress disorder)     Rectal bleed     Seizures (HCC)     Type 1 diabetes (HCC)        Past Surgical History:   Procedure Laterality Date    COLONOSCOPY      KNEE SURGERY Right 07/06/2020    NASAL SEPTOPLASTY W/ TURBINOPLASTY      SINUS SURGERY      SKIN BIOPSY      TURBINOPLASTY N/A 3/22/2021    Procedure: TURBINOPLASTY;  Surgeon: Jn Hidalgo MD;  Location: [facility];  Service: ENT    UPPER GASTROINTESTINAL ENDOSCOPY      WISDOM TOOTH EXTRACTION      WRIST SURGERY      left; Excision of ganglion       Current Outpatient Medications   Medication Sig Dispense Refill    BD Insulin Syringe U/F 31G X 5/16\" 0.5 ML MISC Use as directly with weekly methotrexate injection. 100 each 0    Belimumab (BENLYSTA IV) Inject into a catheter in a vein every 14 (fourteen) days Switching to monthly on 12/5      benzonatate (TESSALON PERLES) 100 mg capsule Take 1 capsule (100 mg total) by mouth 3 (three) times a day as needed for cough 20 capsule 0    cefdinir (OMNICEF) 300 mg capsule Take 1 capsule (300 mg total) by mouth every 12 (twelve) hours for 21 days 42 capsule 0    ciprofloxacin-dexamethasone (CIPRODEX) otic suspension Administer 4 drops into the left ear 2 (two) times a day 7.5 mL 0    clindamycin (CLEOCIN T) 1 % lotion Apply 1 application. topically 2 (two) times a day      Cyanocobalamin 1000 MCG SUBL Place 1 tablet (1,000 mcg total) under the tongue daily 90 tablet 0    divalproex sodium (Depakote ER) 250 mg 24 hr tablet Take 2 tabs for 3 days, then 1 tab for 2 days (Patient not taking: Reported on 2/14/2024) 8 tablet 1    Fluticasone-Salmeterol (Advair) 250-50 mcg/dose inhaler Inhale 1 puff 2 (two) times a day Rinse mouth after use. 60 blister 0    folic acid (FOLVITE) 1 mg tablet Take 1 tablet (1 mg total) by mouth daily 90 tablet 3    gabapentin (Neurontin) 600 MG tablet Take 1 tablet (600 mg total) by mouth daily 90 tablet 1    Glucagon (Gvoke HypoPen 2-Pack) 1 MG/0.2ML SOAJ 1 mg PRN for severe " hypoglycemia 0.4 mL 0    glucagon 1 MG injection 1 mg      Glucagon HCl (Glucagon Emergency) 1 MG/ML SOLR INJECT 1 MG AS DIRECTED AS NEEDED (WHEN PASSED OUT FROM LOW BLOOD SUGAR). (Patient not taking: Reported on 2/14/2024)      guaifenesin-codeine (GUAIFENESIN AC) 100-10 MG/5ML liquid Take 5 mL by mouth 3 (three) times a day as needed for cough 150 mL 0    hydroxychloroquine (PLAQUENIL) 200 mg tablet Take 1 tablet (200 mg total) by mouth 2 (two) times a day with meals 180 tablet 3    Insulin Disposable Pump (Omnipod 5 G6 Pod, Gen 5,) MISC INJECT 1 EACH UNDER THE SKIN EVERY THIRD DAY. E10.65 (Patient not taking: Reported on 2/14/2024)      insulin lispro (HumaLOG) 100 units/mL injection Inject 15 Units under the skin 3 (three) times a day before meals (Patient not taking: Reported on 2/14/2024) 10 mL 0    Insulin Pen Needle (BD PEN NEEDLE NASIM U/F) 32G X 4 MM MISC by Does not apply route 4 (four) times a day for 180 days 400 each 3    levonorgestrel-ethinyl estradiol (NORDETTE) 0.15-30 MG-MCG per tablet Take 1 tablet by mouth daily 28 tablet 3    levothyroxine 25 mcg tablet Take 1 tablet (25 mcg total) by mouth daily 60 tablet 0    LORazepam (Ativan) 0.5 mg tablet Take 1 tablet (0.5 mg total) by mouth 2 (two) times a day as needed for anxiety 60 tablet 0    metFORMIN (GLUCOPHAGE-XR) 500 mg 24 hr tablet Take 1 tablet (500 mg total) by mouth daily with dinner 30 tablet 5    methotrexate 50 MG/2ML injection Inject 0.8 mL (20 mg total) under the skin once a week 8 mL 4    miconazole 2 % cream Apply 1 Application topically 2 (two) times a day To affected area      NovoLOG 100 UNIT/ML injection Up to 100 units per day with insulin pump 90 mL 3    OneTouch Verio test strip USE 4 TIMES A DAY BEFORE MEALS AND AT BEDTIME      sodium chloride 1 g tablet Take 1 tablet (1 g total) by mouth if needed (for dizziness an lightheadendess in setting of low BP) 30 tablet 0    Tresiba FlexTouch 100 units/mL injection pen Inject 30 Units  under the skin daily 15 mL 0     No current facility-administered medications for this visit.        Allergies   Allergen Reactions    Insulin Glargine Other (See Comments)     LANTUS BRAND -Burning and redness under skin        Review of Systems      Mood Anxiety, Depression and Emotional Lability   Behavior Impulsive Behavior   Thought Content Disturbing Thoughts, Feelings   General Emotional Problems and Decreased Functioning   Personality Normal   Other Psych Symptoms Normal   Constitutional Negative   ENT Negative   Cardiovascular Negative   Respiratory Negative   Gastrointestinal Negative   Genitourinary Negative   Musculoskeletal Negative   Integumentary Negative   Neurological Negative   Endocrine Normal    Other Symptoms Normal        Laboratory Results:   Recent Labs (last 12 months):   Office Visit on 02/22/2024   Component Date Value    Wound Culture 02/22/2024 3+ Growth of Haemophilus influenzae (A)     Gram Stain Result 02/22/2024 Rare Epithelial Cells     Gram Stain Result 02/22/2024 No Polys or Bacteria seen    Admission on 02/20/2024, Discharged on 02/20/2024   Component Date Value    SARS-CoV-2 02/20/2024 Negative     INFLUENZA A PCR 02/20/2024 Negative     INFLUENZA B PCR 02/20/2024 Negative     RSV PCR 02/20/2024 Negative     STREP A PCR 02/20/2024 Not Detected     EXT Preg Test, Ur 02/20/2024 Negative     Control 02/20/2024 Valid     WBC 02/20/2024 9.88     RBC 02/20/2024 4.49     Hemoglobin 02/20/2024 12.9     Hematocrit 02/20/2024 40.1     MCV 02/20/2024 89     MCH 02/20/2024 28.7     MCHC 02/20/2024 32.2     RDW 02/20/2024 12.9     MPV 02/20/2024 12.0     Platelets 02/20/2024 194     nRBC 02/20/2024 0     Neutrophils Relative 02/20/2024 48     Immat GRANS % 02/20/2024 1     Lymphocytes Relative 02/20/2024 40     Monocytes Relative 02/20/2024 8     Eosinophils Relative 02/20/2024 2     Basophils Relative 02/20/2024 1     Neutrophils Absolute 02/20/2024 4.84     Immature Grans Absolute  02/20/2024 0.12     Lymphocytes Absolute 02/20/2024 3.93     Monocytes Absolute 02/20/2024 0.79     Eosinophils Absolute 02/20/2024 0.15     Basophils Absolute 02/20/2024 0.05     Sodium 02/20/2024 136     Potassium 02/20/2024 4.4     Chloride 02/20/2024 104     CO2 02/20/2024 21     ANION GAP 02/20/2024 11     BUN 02/20/2024 12     Creatinine 02/20/2024 0.64     Glucose 02/20/2024 131     Calcium 02/20/2024 9.4     AST 02/20/2024 33     ALT 02/20/2024 13     Alkaline Phosphatase 02/20/2024 78     Total Protein 02/20/2024 7.4     Albumin 02/20/2024 4.1     Total Bilirubin 02/20/2024 0.61     eGFR 02/20/2024 122     Lipase 02/20/2024 27    No results displayed because visit has over 200 results.      Hospital Outpatient Visit on 12/19/2023   Component Date Value    Cortisol, Random 12/19/2023 11.2     ACTH 12/19/2023 14.0     Cortisol, Random 12/19/2023 24.3     Cortisol, Random 12/19/2023 32.8    Appointment on 12/06/2023   Component Date Value    Cortisol - AM 12/06/2023 12.7     DHEA 12/06/2023 618     ACTH 12/06/2023 21.0    Hospital Outpatient Visit on 11/22/2023   Component Date Value    LV EF 11/22/2023 60     Est. RA pres 11/22/2023 3.0    Lab on 11/02/2023   Component Date Value    STREP PNEUMO TYPE1 11/02/2023 2.4     Pneumococcal antibody Ty* 11/02/2023 1.3 (L)     Strep pneumo Type 4 11/02/2023 0.2 (L)     Pneumococcal antibody Ty* 11/02/2023 12.9     Strep pneumo Type 9 11/02/2023 11.2     Strep pneumo Type 12 11/02/2023 2.7     Strep pneumo Type 14 11/02/2023 2.9     Pneumo Ab Type 17 (17F) 11/02/2023 1.9     Strep pneumo Type 19 11/02/2023 5.3     Pneumo Ab Type 2 11/02/2023 5.8     Pneumo Ab Type 20 11/02/2023 8.5     Pneumo Ab Type 22 (22F) 11/02/2023 4.1     Pneumococcal antibody Ty* 11/02/2023 3.4     Strep pneumo Type 26 11/02/2023 0.5 (L)     Pneumo Ab Type 43 (11A) 11/02/2023 0.8 (L)     Pneumo Ab Type 5 11/02/2023 1.0 (L)     Strep pneumo Type 51 11/02/2023 0.4 (L)     Pneumo Ab Type 54 (15B)  11/02/2023 2.9     Strep pneumo Type 56 11/02/2023 0.3 (L)     Strep pneumo Type 57 11/02/2023 2.6     Strep pneumo Type 68 11/02/2023 2.4     Pneumo Ab Type 70 (33F) 11/02/2023 2.2     Pneumo Ab Type 34 (10A) 11/02/2023 1.0 (L)    Lab on 10/27/2023   Component Date Value    Renin 10/27/2023 0.988     Aldosterone 10/27/2023 1.7     Aldos/Renin Ratio 10/27/2023 1.7     Albumin 10/27/2023 4.2     Calcium 10/27/2023 9.2     Phosphorus 10/27/2023 4.3     Glucose 10/27/2023 96     BUN 10/27/2023 13     Creatinine 10/27/2023 0.76     Sodium 10/27/2023 140     Potassium 10/27/2023 4.0     Chloride 10/27/2023 103     CO2 10/27/2023 27     ANION GAP 10/27/2023 10     eGFR 10/27/2023 108    Consult on 10/26/2023   Component Date Value    Creatinine, Ur 10/27/2023 42.1     Protein Urine Random 10/27/2023 <4     Prot/Creat Ratio, Ur 10/27/2023      Creatinine, Ur 10/27/2023 42.1     Albumin,U,Random 10/27/2023 <7.0     Albumin Creat Ratio 10/27/2023 <17     Color, UA 10/27/2023 Colorless     Clarity, UA 10/27/2023 Clear     Specific Gravity, UA 10/27/2023 1.011     pH, UA 10/27/2023 6.5     Leukocytes, UA 10/27/2023 Negative     Nitrite, UA 10/27/2023 Negative     Protein, UA 10/27/2023 Negative     Glucose, UA 10/27/2023 Negative     Ketones, UA 10/27/2023 Negative     Urobilinogen, UA 10/27/2023 <2.0     Bilirubin, UA 10/27/2023 Negative     Occult Blood, UA 10/27/2023 Negative     RBC, UA 10/27/2023 None Seen     WBC, UA 10/27/2023 1-2     Epithelial Cells 10/27/2023 Occasional     Bacteria, UA 10/27/2023 None Seen     LEUKOCYTE ESTERASE,UA 10/26/2023 70+     NITRITE,UA 10/26/2023 -     SL AMB POCT UROBILINOGEN 10/26/2023 0.2     POCT URINE PROTEIN 10/26/2023 15      PH,UA 10/26/2023 8.0     BLOOD,UA 10/26/2023 -     SPECIFIC GRAVITY,UA 10/26/2023 1.015     KETONES,UA 10/26/2023 -     BILIRUBIN,UA 10/26/2023 -     GLUCOSE, UA 10/26/2023 -      COLOR,UA 10/26/2023 yellow     CLARITY,UA 10/26/2023 clear    Transcribe Orders on  10/20/2023   Component Date Value    WBC 12/06/2023 8.86     RBC 12/06/2023 4.43     Hemoglobin 12/06/2023 13.0     Hematocrit 12/06/2023 39.3     MCV 12/06/2023 89     MCH 12/06/2023 29.3     MCHC 12/06/2023 33.1     RDW 12/06/2023 13.4     MPV 12/06/2023 12.5     Platelets 12/06/2023 208     nRBC 12/06/2023 0     Neutrophils Relative 12/06/2023 55     Immat GRANS % 12/06/2023 0     Lymphocytes Relative 12/06/2023 38     Monocytes Relative 12/06/2023 6     Eosinophils Relative 12/06/2023 1     Basophils Relative 12/06/2023 0     Neutrophils Absolute 12/06/2023 4.85     Immature Grans Absolute 12/06/2023 0.03     Lymphocytes Absolute 12/06/2023 3.34     Monocytes Absolute 12/06/2023 0.56     Eosinophils Absolute 12/06/2023 0.06     Basophils Absolute 12/06/2023 0.02     Sodium 12/06/2023 137     Potassium 12/06/2023 4.2     Chloride 12/06/2023 103     CO2 12/06/2023 26     ANION GAP 12/06/2023 8     BUN 12/06/2023 16     Creatinine 12/06/2023 0.83     Glucose, Fasting 12/06/2023 189 (H)     Calcium 12/06/2023 9.2     AST 12/06/2023 19     ALT 12/06/2023 13     Alkaline Phosphatase 12/06/2023 59     Total Protein 12/06/2023 6.6     Albumin 12/06/2023 4.4     Total Bilirubin 12/06/2023 0.92     eGFR 12/06/2023 97     CRP 12/06/2023 <1.0     Sed Rate 12/06/2023 9     C4, COMPLEMENT 12/06/2023 13 (L)     C3 Complement 12/06/2023 117     ds DNA Ab 12/06/2023 <1    There may be more visits with results that are not included.       Substance Abuse History:  Social History     Substance and Sexual Activity   Drug Use Never       Family Psychiatric History:   Family History   Problem Relation Age of Onset    Hypertension Mother     Migraines Mother         Headache    Diabetes type II Mother     Varicose Veins Mother     Hyperlipidemia Mother     Diabetes Mother     Arthritis Mother     Depression Mother     Hearing loss Mother     Anxiety disorder Mother     Eczema Father     Cholelithiasis Father     Hypertension Father      Sarcoidosis Father         Liver    Hyperlipidemia Father     Diabetes Father     Coronary artery disease Father     Nephrolithiasis Father     Cirrhosis Father     Alcohol abuse Father     Thyroid disease Sister     Hashimoto's thyroiditis Sister     Alcohol abuse Brother     Cancer Family     Diabetes Family     Hypertension Family        The following portions of the patient's history were reviewed and updated as appropriate: allergies, current medications, past family history, past medical history, past social history, past surgical history and problem list.    Social History     Socioeconomic History    Marital status: /Civil Union     Spouse name: Not on file    Number of children: 0    Years of education: Not on file    Highest education level: Associate degree: academic program   Occupational History    Occupation: unemployed   Tobacco Use    Smoking status: Never     Passive exposure: Never    Smokeless tobacco: Never    Tobacco comments:     Tobacco smoke exposure (Father smokes cigars)   Vaping Use    Vaping status: Never Used   Substance and Sexual Activity    Alcohol use: Yes     Comment: rarely    Drug use: Never    Sexual activity: Yes     Partners: Male     Birth control/protection: Condom Male, OCP   Other Topics Concern    Not on file   Social History Narrative    Student at [employer]    Reports a poor diet; low in vegetables, high in sweets    Dental care, regularly    Lives with parents    Sleeps 8-10 hours a day        Do you have pets? Dog,cat Is pet allowed in bedroom?Yes    Are you a smoker? Never    Does anyone smoke in your home? No       Do you live with smokers? Yes    Travel [place] frequently? No   How many times a year? N/A      Social Determinants of Health     Financial Resource Strain: Low Risk  (7/8/2023)    Received from [facility]    Overall Financial Resource Strain (CARDIA)     Difficulty of Paying Living Expenses: Not hard at all   Food Insecurity: No Food  Insecurity (1/16/2024)    Hunger Vital Sign     Worried About Running Out of Food in the Last Year: Never true     Ran Out of Food in the Last Year: Never true   Transportation Needs: No Transportation Needs (1/16/2024)    PRAPARE - Transportation     Lack of Transportation (Medical): No     Lack of Transportation (Non-Medical): No   Physical Activity: Insufficiently Active (8/7/2020)    Exercise Vital Sign     Days of Exercise per Week: 7 days     Minutes of Exercise per Session: 10 min   Stress: Stress Concern Present (8/7/2020)    [employer] [place] [employer] - Occupational Stress Questionnaire     Feeling of Stress : Rather much   Social Connections: Unknown (8/7/2020)    Social Connection and Isolation Panel [NHANES]     Frequency of Communication with Friends and Family: More than three times a week     Frequency of Social Gatherings with Friends and Family: Not on file     Attends Evangelical Services: Not on file     Active Member of Clubs or Organizations: No     Attends Club or Organization Meetings: Never     Marital Status: Never    Intimate Partner Violence: Not At Risk (7/8/2023)    Received from [facility]    Humiliation, Afraid, Rape, and Kick questionnaire     Fear of Current or Ex-Partner: No     Emotionally Abused: No     Physically Abused: No     Sexually Abused: No   Housing Stability: Unknown (1/16/2024)    Housing Stability Vital Sign     Unable to Pay for Housing in the Last Year: No     Number of Places Lived in the Last Year: Not on file     Unstable Housing in the Last Year: No     Social History     Social History Narrative    Student at [employer]    Reports a poor diet; low in vegetables, high in sweets    Dental care, regularly    Lives with parents    Sleeps 8-10 hours a day        Do you have pets? Dog,cat Is pet allowed in bedroom?Yes    Are you a smoker? Never    Does anyone smoke in your home? No       Do you live with smokers? Yes    [place]  frequently? No   How many times a year? N/A        Objective:       Mental status:  Appearance calm and cooperative , adequate hygiene and grooming and good eye contact    Mood dysphoric   Affect affect was constricted   Speech a normal rate and fluent   Thought Processes coherent/organized and normal thought processes   Hallucinations no hallucinations present    Thought Content no delusions   Abnormal Thoughts no suicidal thoughts  and no homicidal thoughts    Orientation  oriented to person and place and time   Remote Memory short term memory intact and long term memory intact   Attention Span concentration intact   Intellect Appears to be of Average Intelligence   Insight Limited insight   Judgement judgment was limited   Muscle Strength Muscle strength and tone were normal and Normal gait    Language no difficulty naming common objects and no difficulty repeating a phrase    Fund of Knowledge displays adequate knowledge of current events, adequate fund of knowledge regarding past history and adequate fund of knowledge regarding vocabulary                Assessment/Plan:       Diagnoses and all orders for this visit:    Bipolar disorder, current episode mixed, moderate (HCC)    Chronic post-traumatic stress disorder (PTSD)    Generalized anxiety disorder with panic attacks                  Treatment Recommendations- Risks Benefits      Immediate Medical/Psychiatric/Psychotherapy Treatments and Any Precautions: as stated in HPI    Risks, Benefits And Possible Side Effects Of Medications:  {PSYCH RISK, BENEFITS AND POSSIBLE SIDE EFFECTS (Optional):83916    Controlled Medication Discussion: Discussed with patient Black Box warning on concurrent use of benzodiazepines and opioid medications including sedation, respiratory depression, coma and death. Patient understands the risk of treatment with benzodiazepines in addition to opioids and wants to continue taking those medications. , Discussed with patient the risks  of sedation, respiratory depression, impairment of ability to drive and potential for abuse and addiction related to treatment with benzodiazepine medications. The patient understands risk of treatment with benzodiazepine medications, agrees to not drive if feels impaired and agrees to take medications as prescribed. and The patient has been filling controlled prescriptions on time as prescribed to [place] Prescription Drug Monitoring program.      Psychotherapy Provided: Individual psychotherapy provided.     Individual psychotherapy provided: Yes  Counseling was provided during the session today for 16 minutes.  Medications, treatment progress and treatment plan reviewed with Jenny.  Medication education provided to Jenny.  Goals discussed during in session: improve control of mood stability.   Recent stressor including  family problems, family conflict, family issues, school stress, health issues, medical problems, limited support, social difficulties, everyday stressors and ongoing anxiety discussed with Jenny.   Coping strategies including compliance with medications, contacting a therapist, deep/slow breathing, eliminating avoidance, engaging in previously avoided activities, exercising, getting into a good routine, improving self-esteem, increasing energy, increasing interest in usual activities, increasing motivation, increasing social interaction, keeping busy at home, maintain healthy diet, maintain heathy sleeping hygiene and maintain positive attitude reviewed with Jenny.   Importance of medication and treatment compliance reviewed with Jenny.  Educated on importance of medication and treatment compliance.  Importance of follow up with family physician for medical issues reviewed with Jenny.  Supportive therapy provided.                            Visit Time    Visit Start Time: 1:30  Visit Stop Time: 2:00  Total Visit Duration:  30 minutes

## 2024-03-01 ENCOUNTER — SOCIAL WORK (OUTPATIENT)
Dept: BEHAVIORAL/MENTAL HEALTH CLINIC | Facility: CLINIC | Age: 29
End: 2024-03-01

## 2024-03-01 DIAGNOSIS — F41.1 GENERALIZED ANXIETY DISORDER WITH PANIC ATTACKS: Primary | ICD-10-CM

## 2024-03-01 DIAGNOSIS — F43.12 CHRONIC POST-TRAUMATIC STRESS DISORDER (PTSD): ICD-10-CM

## 2024-03-01 DIAGNOSIS — F31.62 BIPOLAR DISORDER, CURRENT EPISODE MIXED, MODERATE (HCC): ICD-10-CM

## 2024-03-01 DIAGNOSIS — F42.2 MIXED OBSESSIONAL THOUGHTS AND ACTS: ICD-10-CM

## 2024-03-01 DIAGNOSIS — F41.0 GENERALIZED ANXIETY DISORDER WITH PANIC ATTACKS: Primary | ICD-10-CM

## 2024-03-01 NOTE — PSYCH
"Behavioral Health Psychotherapy Progress Note    Psychotherapy Provided: Individual Psychotherapy     1. Generalized anxiety disorder with panic attacks        2. Mixed obsessional thoughts and acts        3. Chronic post-traumatic stress disorder (PTSD)        4. Bipolar disorder, current episode mixed, moderate (HCC)            Goals addressed in session: Goal 1 and Goal 2     DATA:   Met with Jenny individually.  Discussion with HR about return to work - 'in process'.  Jenny feels they are buying time as her position has been filled and was offered a similar job.  Chris got a job at [employer] as .  Jenny also interviewed for a position that will allow her to work from home due to Lupus (they make exceptions).  Discussed health concerns.  Jenny and Chris 'ok' - some disagreements.  Living at home becoming overwhelming - especially since dad is home more since selling business.  Mom has 2 new puppies. Denied SI  During this session, this clinician used the following therapeutic modalities: Client-centered Therapy, Cognitive Behavioral Therapy, Cognitive Processing Therapy, and Supportive Psychotherapy    Substance Abuse was not addressed during this session. If the client is diagnosed with a co-occurring substance use disorder, please indicate any changes in the frequency or amount of use: . Stage of change for addressing substance use diagnoses: No substance use/Not applicable    ASSESSMENT:  Jenny Rodrigues presents with a Labile mood.     her affect is Constricted, which is congruent, with her mood and the content of the session. The client has made progress on their goals.     Jenny Rodrigues presents with a low risk of suicide, low risk of self-harm, and low risk of harm to others.    For any risk assessment that surpasses a \"low\" rating, a safety plan must be developed.    A safety plan was indicated: no  If yes, describe in detail     PLAN: Between sessions, Jenny Rodrigues will " Follow up with HR, support Chris with transitioning to new job. . At the next session, the therapist will use Client-centered Therapy, Cognitive Behavioral Therapy, Cognitive Processing Therapy, and Supportive Psychotherapy to address mood .    Behavioral Health Treatment Plan and Discharge Planning: Jenny Ryanquan is aware of and agrees to continue to work on their treatment plan. They have identified and are working toward their discharge goals. yes    Visit start and stop times:    03/01/24  Start Time: 1358  Stop Time: 1455  Total Visit Time: 57 minutes

## 2024-03-02 ENCOUNTER — PATIENT MESSAGE (OUTPATIENT)
Age: 29
End: 2024-03-02

## 2024-03-02 DIAGNOSIS — Z30.41 ENCOUNTER FOR SURVEILLANCE OF CONTRACEPTIVE PILLS: ICD-10-CM

## 2024-03-05 ENCOUNTER — TELEPHONE (OUTPATIENT)
Dept: PSYCHIATRY | Facility: CLINIC | Age: 29
End: 2024-03-05

## 2024-03-05 RX ORDER — LEVONORGESTREL AND ETHINYL ESTRADIOL 0.15-0.03
1 KIT ORAL DAILY
Qty: 84 TABLET | Refills: 1 | Status: SHIPPED | OUTPATIENT
Start: 2024-03-05 | End: 2024-08-20

## 2024-03-05 NOTE — TELEPHONE ENCOUNTER
Called and left message for patient to return a call to [phone] and schedule 8 week follow up with provider (4/24/2024). Please schedule upon return call. Thank you.

## 2024-03-08 DIAGNOSIS — E10.649 UNCONTROLLED TYPE 1 DIABETES MELLITUS WITH HYPOGLYCEMIA WITHOUT COMA (HCC): ICD-10-CM

## 2024-03-08 RX ORDER — METFORMIN HYDROCHLORIDE 500 MG/1
500 TABLET, EXTENDED RELEASE ORAL
Qty: 90 TABLET | Refills: 1 | Status: SHIPPED | OUTPATIENT
Start: 2024-03-08

## 2024-03-11 ENCOUNTER — NURSE TRIAGE (OUTPATIENT)
Age: 29
End: 2024-03-11

## 2024-03-11 NOTE — TELEPHONE ENCOUNTER
"Last ov 2/22/23 LEONARD [facility], Procedure EGD 3/23/23, Colon 10/10/2019, Labs 2/20/24, Imaging CT A/P 1/14/24    FYI:  Patient noting upper abdominal pain, sternal area, under right ribs especially after eating, nausea and vomiting at night (has awoken having to vomit), she feels as though something in throat,formed stool but greasy at times. She is currently on Cipro (previously on amoxicillin) for sinus issues. Patient has only been taking TUMS without relief. I advised she purchase OTC Pepcid AC take twice a day (either breakfast or lunch and another at bedtime), go to liquids for now and then bland diet. Patient scheduled for visit 3/13/24 with KARI Garcia since available appointment to address symptoms.      Reason for Disposition   MILD pain (e.g., does not interfere with normal activities) and pain comes and goes (cramps) lasts > 48 hours (Exception: this same abdominal pain is a chronic symptom recurrent or ongoing AND present > 4 weeks)    Answer Assessment - Initial Assessment Questions  1. LOCATION: \"Where does it hurt?\"       Sternal area, under ribs right side  2. RADIATION: \"Does the pain shoot anywhere else?\" (e.g., chest, back)      To back  3. ONSET: \"When did the pain begin?\" (e.g., minutes, hours or days ago)       Started one week ago  4. SUDDEN: \"Gradual or sudden onset?\"      Came on suddenly  5. PATTERN \"Does the pain come and go, or is it constant?\"     - If constant: \"Is it getting better, staying the same, or worsening?\"       (Note: Constant means the pain never goes away completely; most serious pain is constant and it progresses)      - If intermittent: \"How long does it last?\" \"Do you have pain now?\"      (Note: Intermittent means the pain goes away completely between bouts)      Intermittent sharp pains  6. SEVERITY: \"How bad is the pain?\"  (e.g., Scale 1-10; mild, moderate, or severe)    - MILD (1-3): doesn't interfere with normal activities, abdomen soft and not tender to touch     - " "MODERATE (4-7): interferes with normal activities or awakens from sleep, tender to touch     - SEVERE (8-10): excruciating pain, doubled over, unable to do any normal activities       Mostly mild but can be moderate  7. RECURRENT SYMPTOM: \"Have you ever had this type of stomach pain before?\" If Yes, ask: \"When was the last time?\" and \"What happened that time?\"       new  8. CAUSE: \"What do you think is causing the stomach pain?\"      unknown  9. RELIEVING/AGGRAVATING FACTORS: \"What makes it better or worse?\" (e.g., movement, antacids, bowel movement)      Nothing helps  10. OTHER SYMPTOMS: \"Has there been any vomiting, diarrhea, constipation, or urine problems?\"        Vomiting, change in stool  11. PREGNANCY: \"Is there any chance you are pregnant?\" \"When was your last menstrual period?\"        Does not get menstrual cycle since 2018, unknown. Patient did take 2 pregnancy tests which  are negative.    Protocols used: Abdominal Pain - Female-ADULT-[place]    "

## 2024-03-11 NOTE — TELEPHONE ENCOUNTER
Regarding: issues  ----- Message from Jared Fernandez MA sent at 3/11/2024  8:21 AM EDT -----  Pt calling with pain in chest back and under ribs after eatting pt with fatty stool and having weight loss. Pt with stuck feeling in throat.

## 2024-03-12 ENCOUNTER — TELEPHONE (OUTPATIENT)
Dept: PSYCHIATRY | Facility: CLINIC | Age: 29
End: 2024-03-12

## 2024-03-12 NOTE — TELEPHONE ENCOUNTER
Patient called and requested to have all future appts with Coupled therapist to be cancelled as pts spouse got a job and they no longer have time. Writer confirmed pt would like me to cancel all future apps and be discharged by provider, patient confirmed.     Writer cancelled appts on 3/13 and 3/27. Patient would like to be discharge,for coupled therapy

## 2024-03-13 ENCOUNTER — OFFICE VISIT (OUTPATIENT)
Dept: GASTROENTEROLOGY | Facility: CLINIC | Age: 29
End: 2024-03-13
Payer: COMMERCIAL

## 2024-03-13 VITALS
OXYGEN SATURATION: 98 % | HEART RATE: 101 BPM | HEIGHT: 61 IN | WEIGHT: 131 LBS | BODY MASS INDEX: 24.73 KG/M2 | SYSTOLIC BLOOD PRESSURE: 103 MMHG | DIASTOLIC BLOOD PRESSURE: 78 MMHG

## 2024-03-13 DIAGNOSIS — R10.13 EPIGASTRIC PAIN: Primary | ICD-10-CM

## 2024-03-13 DIAGNOSIS — K21.9 GASTROESOPHAGEAL REFLUX DISEASE, UNSPECIFIED WHETHER ESOPHAGITIS PRESENT: ICD-10-CM

## 2024-03-13 DIAGNOSIS — R11.2 NAUSEA AND VOMITING, UNSPECIFIED VOMITING TYPE: ICD-10-CM

## 2024-03-13 PROCEDURE — 99214 OFFICE O/P EST MOD 30 MIN: CPT | Performed by: PHYSICIAN ASSISTANT

## 2024-03-13 RX ORDER — FAMOTIDINE 20 MG/1
20 TABLET, FILM COATED ORAL 2 TIMES DAILY
COMMUNITY

## 2024-03-13 NOTE — PROGRESS NOTES
[facility] - Outpatient Progress Note  Jenny Rodrigues 29 y.o. female MRN: [id number]  Encounter: [id number]    Assessment and Plan    1. Epigastric pain  Get mesenteric doppler  To consider repeat HIDA  Get stool H.pylori    2. Nausea and vomiting, unspecified vomiting type  Repeat NM GES    3. Gastroesophageal reflux disease, unspecified whether esophagitis present  - pepcid AC      --------------------------------------------------------------------------------------------------------------------    Chief Complaint: Epigastric pain, nausea vomiting, greasy stool    HPI: Jenny Rodrigues is a 29 y.o. female with history of type 1 diabetes, anxiety/depression, Hashimoto's thyroiditis, undifferentiated connective tissue disease who presents for follow-up.  Last seen in the office on February 22, 2023 for intermittent right upper quadrant abdominal pain, nausea vomiting and stearrhea     Last EGD March 23, 2023 only showed mild gastritis.  Gastric biopsies negative for H. pylori and gastric intestinal metaplasia.  Duodenal biopsy negative for celiac disease.     She had normal gastric emptying study in 2021, normal UGI series 5/2022, normal RUQ ultrasound 9/2022, normal HIDA scan 10/2022.  Previous celiac disease antibody testing negative.  Normal colonoscopy in 10/2019.  Recent CT scan on January 14, 2024 for chest and abdominal pain with IV contrast showed no acute abnormality.  She did have a fecal elastase done on March 1, 2023 which was low at 135 suggesting moderate exocrine pancreatic insufficiency.  Creon not really helpful.   Some mild weight loss.  No diarrhea.    Endoscopy History:  EGD - March 23, 2023 only showed mild gastritis.  Gastric biopsies negative for H. pylori and gastric intestinal metaplasia.  Duodenal biopsy negative for celiac disease.  Colonoscopy -October 2019 normal    Review of Systems:   General: negative for fatigue, fever, night sweats or unexpected weight  "loss  Psychological: negative for anxiety or depression  Ophthalmic: negative for blurry vision or scleral icterus  ENT: negative for headaches, sore throat or dysphagia  Hematological and Lymphatic: negative for pallor or swollen lymph nodes  Respiratory: negative for cough, shortness of breath or wheezing  Cardiovascular: negative for chest pain, edema or murmur  Gastrointestinal: as mentioned in HPI  Genito-Urinary: negative for dysuria or incontinence  Musculoskeletal: negative for joint pain, joint stiffness or joint swelling  Dermatological: negative for pruritus, rash, or jaundice    Current Medications  Current Outpatient Medications   Medication Sig Dispense Refill   • BD Insulin Syringe U/F 31G X 5/16\" 0.5 ML MISC Use as directly with weekly methotrexate injection. 100 each 0   • Belimumab (BENLYSTA IV) Inject into a catheter in a vein every month to absorb continually Switching to monthly on 12/5     • cefdinir (OMNICEF) 300 mg capsule Take 1 capsule (300 mg total) by mouth every 12 (twelve) hours for 21 days 42 capsule 0   • clindamycin (CLEOCIN T) 1 % lotion Apply 1 application. topically 2 (two) times a day     • Cyanocobalamin 1000 MCG SUBL Place 1 tablet (1,000 mcg total) under the tongue daily 90 tablet 0   • famotidine (Pepcid) 20 mg tablet Take 20 mg by mouth 2 (two) times a day     • folic acid (FOLVITE) 1 mg tablet Take 1 tablet (1 mg total) by mouth daily 90 tablet 3   • gabapentin (Neurontin) 600 MG tablet Take 1 tablet (600 mg total) by mouth daily 90 tablet 1   • Glucagon (Gvoke HypoPen 2-Pack) 1 MG/0.2ML SOAJ 1 mg PRN for severe hypoglycemia 0.4 mL 0   • hydroxychloroquine (PLAQUENIL) 200 mg tablet Take 1 tablet (200 mg total) by mouth 2 (two) times a day with meals 180 tablet 3   • Insulin Pen Needle (BD PEN NEEDLE NASIM U/F) 32G X 4 MM MISC by Does not apply route 4 (four) times a day for 180 days 400 each 3   • levonorgestrel-ethinyl estradiol (NORDETTE) 0.15-30 MG-MCG per tablet Take 1 " tablet by mouth daily 84 tablet 1   • levothyroxine 25 mcg tablet Take 1 tablet (25 mcg total) by mouth daily 60 tablet 0   • LORazepam (Ativan) 0.5 mg tablet Take 1 tablet (0.5 mg total) by mouth 2 (two) times a day as needed for anxiety 60 tablet 0   • metFORMIN (GLUCOPHAGE-XR) 500 mg 24 hr tablet TAKE 1 TABLET BY MOUTH EVERY DAY WITH DINNER 90 tablet 1   • methotrexate 50 MG/2ML injection Inject 0.8 mL (20 mg total) under the skin once a week 8 mL 4   • NovoLOG 100 UNIT/ML injection Up to 100 units per day with insulin pump 90 mL 3   • OneTouch Verio test strip USE 4 TIMES A DAY BEFORE MEALS AND AT BEDTIME     • sodium chloride 1 g tablet Take 1 tablet (1 g total) by mouth if needed (for dizziness an lightheadendess in setting of low BP) 30 tablet 0   • Tresiba FlexTouch 100 units/mL injection pen Inject 30 Units under the skin daily 15 mL 0   • benzonatate (TESSALON PERLES) 100 mg capsule Take 1 capsule (100 mg total) by mouth 3 (three) times a day as needed for cough (Patient not taking: Reported on 3/13/2024) 20 capsule 0   • ciprofloxacin-dexamethasone (CIPRODEX) otic suspension Administer 4 drops into the left ear 2 (two) times a day (Patient not taking: Reported on 3/13/2024) 7.5 mL 0   • divalproex sodium (Depakote ER) 250 mg 24 hr tablet Take 2 tabs for 3 days, then 1 tab for 2 days (Patient not taking: Reported on 2/14/2024) 8 tablet 1   • Fluticasone-Salmeterol (Advair) 250-50 mcg/dose inhaler Inhale 1 puff 2 (two) times a day Rinse mouth after use. (Patient not taking: Reported on 3/13/2024) 60 blister 0   • glucagon 1 MG injection 1 mg (Patient not taking: Reported on 3/13/2024)     • Glucagon HCl (Glucagon Emergency) 1 MG/ML SOLR INJECT 1 MG AS DIRECTED AS NEEDED (WHEN PASSED OUT FROM LOW BLOOD SUGAR). (Patient not taking: Reported on 2/14/2024)     • guaifenesin-codeine (GUAIFENESIN AC) 100-10 MG/5ML liquid Take 5 mL by mouth 3 (three) times a day as needed for cough (Patient not taking: Reported on  3/13/2024) 150 mL 0   • Insulin Disposable Pump (Omnipod 5 G6 Pod, Gen 5,) MISC INJECT 1 EACH UNDER THE SKIN EVERY THIRD DAY. E10.65 (Patient not taking: Reported on 2/14/2024)     • insulin lispro (HumaLOG) 100 units/mL injection Inject 15 Units under the skin 3 (three) times a day before meals (Patient not taking: Reported on 2/14/2024) 10 mL 0   • miconazole 2 % cream Apply 1 Application topically 2 (two) times a day To affected area (Patient not taking: Reported on 3/13/2024)       No current facility-administered medications for this visit.       Past Medical History  Past Medical History:   Diagnosis Date   • Abdominal pain    • Anaphylaxis    • Anxiety    • Anxiety and depression    • COVID-19 12/2020   • Delayed emergence from anesthesia 03/23/2023    Severe episode of emergence delirium (confused, combative) after MAC anesthetic on 03/2023 for EGD. Received versed 2mg, >50mcg precedex, 5mg IV haldol, and 50mcg fentanyl. Waxing and waning episodes of agitation. Any future anesthetics should NOT be done at [facility].   • Delirium, induced by drug     anesthesia   • Depression    • Diabetes mellitus (HCC)    • Disease of thyroid gland     Hashimoto   • DKA, type 1 (HCC) 05/11/2021   • Eating disorder     history of anorexia/bulemia [id number]   • Food intolerance    • Fracture of fibula     R Salter I   • Gilbert disease    • Hashimoto's disease 02/21/2020   • Head injury    • Headache(784.0)    • Nasal congestion    • PTSD (post-traumatic stress disorder)    • Rectal bleed    • Seizures (HCC)    • Type 1 diabetes (HCC)        Past Surgical History  Past Surgical History:   Procedure Laterality Date   • COLONOSCOPY     • EGD  03/23/2023   • KNEE SURGERY Right 07/06/2020   • NASAL SEPTOPLASTY W/ TURBINOPLASTY     • SINUS SURGERY     • SKIN BIOPSY     • TURBINOPLASTY N/A 03/22/2021    Procedure: TURBINOPLASTY;  Surgeon: Jn Hidalgo MD;  Location: [facility];  Service: ENT   • UPPER GASTROINTESTINAL ENDOSCOPY      • WISDOM TOOTH EXTRACTION     • WRIST SURGERY      left; Excision of ganglion       Past Social History   Social History     Socioeconomic History   • Marital status: /Civil Union     Spouse name: None   • Number of children: 0   • Years of education: None   • Highest education level: Associate degree: academic program   Occupational History   • Occupation: unemployed   Tobacco Use   • Smoking status: Never     Passive exposure: Never   • Smokeless tobacco: Never   • Tobacco comments:     Tobacco smoke exposure (Father smokes cigars)   Vaping Use   • Vaping status: Never Used   Substance and Sexual Activity   • Alcohol use: Yes     Comment: rarely   • Drug use: Never   • Sexual activity: Yes     Partners: Male     Birth control/protection: Condom Male, OCP   Other Topics Concern   • None   Social History Narrative    Student at [employer]    Reports a poor diet; low in vegetables, high in sweets    Dental care, regularly    Lives with parents    Sleeps 8-10 hours a day        Do you have pets? Dog,cat Is pet allowed in bedroom?Yes    Are you a smoker? Never    Does anyone smoke in your home? No       Do you live with smokers? Yes    [place] frequently? No   How many times a year? N/A      Social Determinants of Health     Financial Resource Strain: Low Risk  (7/8/2023)    Received [facility]    Overall Financial Resource Strain (CARDIA)    • Difficulty of Paying Living Expenses: Not hard at all   Food Insecurity: No Food Insecurity (1/16/2024)    Hunger Vital Sign    • Worried About Running Out of Food in the Last Year: Never true    • Ran Out of Food in the Last Year: Never true   Transportation Needs: No Transportation Needs (1/16/2024)    PRAPARE - Transportation    • Lack of Transportation (Medical): No    • Lack of Transportation (Non-Medical): No   Physical Activity: Insufficiently Active (8/7/2020)    Exercise Vital Sign    • Days of Exercise per Week: 7 days    • Minutes of  "Exercise per Session: 10 min   Stress: Stress Concern Present (8/7/2020)    [employer] - Occupational Stress Questionnaire    • Feeling of Stress : Rather much   Social Connections: Unknown (8/7/2020)    Social Connection and Isolation Panel [NHANES]    • Frequency of Communication with Friends and Family: More than three times a week    • Frequency of Social Gatherings with Friends and Family: Not on file    • Attends Mosque Services: Not on file    • Active Member of Clubs or Organizations: No    • Attends Club or Organization Meetings: Never    • Marital Status: Never    Intimate Partner Violence: Not At Risk (7/8/2023)    Received from [facility]    Humiliation, Afraid, Rape, and Kick questionnaire    • Fear of Current or Ex-Partner: No    • Emotionally Abused: No    • Physically Abused: No    • Sexually Abused: No   Housing Stability: Unknown (1/16/2024)    Housing Stability Vital Sign    • Unable to Pay for Housing in the Last Year: No    • Number of Places Lived in the Last Year: Not on file    • Unstable Housing in the Last Year: No       Vital Signs  Vitals:    03/13/24 1335   BP: 103/78   BP Location: Left arm   Patient Position: Sitting   Cuff Size: Standard   Pulse: 101   SpO2: 98%   Weight: 59.4 kg (131 lb)   Height: 5' 1\" (1.549 m)       Physical Exam:  General appearance: alert, cooperative, no distress  HEENT: normocephalic, anicteric, no eye erythema or discharge, no oropharyngeal thrush  Neck: supple  Lungs: CTA b/l, no rales, rhonchi, or wheezing, unlabored respirations  Heart: RRR, no murmur, rubs, or gallops  Abdomen: soft, non-tender, non-distended, normal bowel sounds, no masses or organomegaly  Rectal: deferred  Extremities: no cyanosis, clubbing, or edema  Musculoskeletal: normal gait  Skin: color and texture normal, no jaundice, no rashes or lesions  Psychiatric: alert and oriented, normal affect and behavior                        "                                   Satnam Garcia PA-C

## 2024-03-13 NOTE — LETTER
"   Subjective       Chief Complaint   Patient presents with   • Foot Pain     follow up          SUBJECTIVE:       This patient presents for follow-up with trial duty at work.  She continues to have left foot pain but has been able to tolerate it.  She walks anywhere from 6000-14,000 steps.  Last night's duty did cause some increase of pain in her foot.  She would like to continue to try this regular duty for now.  She does have rheumatology scheduled in January and possibly sooner with the secondary referral created last visit but she has yet to hear from them.  History of Present Illness       Review of Systems   Musculoskeletal:        Left foot pain continues.  Right elbow pain resolved.  Some right foot pain      I have reviewed the ROS as documented above. Hunter Sánchez MD              OBJECTIVE   Objective   /81   Pulse 56   Temp 96.9 °F (36.1 °C) (Tympanic)   Resp 16   Ht 157.5 cm (62.01\")   Wt 86.6 kg (191 lb)   LMP 06/20/2017 (Exact Date)   SpO2 95%   BMI 34.92 kg/m²  Body mass index is 34.92 kg/m².    Physical Exam  Constitutional:       General: She is not in acute distress.  Musculoskeletal:        Feet:    Neurological:      Mental Status: She is alert.                         Procedures           ASSESSMENT/PLAN:     Assessment/Plan   Diagnoses and all orders for this visit:    1. Left foot pain (Primary)  Assessment & Plan:  We will see as needed basis.  Patient to follow-up with rheumatologist.    Orders:  -     ibuprofen (ADVIL,MOTRIN) 600 MG tablet; Take 1 tablet by mouth Every 6 (Six) Hours As Needed for Moderate Pain  for up to 90 days.  Dispense: 120 tablet; Refill: 0    2. Osteoarthritis of left ankle or foot  Assessment & Plan:  See discussion regarding foot pain    Orders:  -     ibuprofen (ADVIL,MOTRIN) 600 MG tablet; Take 1 tablet by mouth Every 6 (Six) Hours As Needed for Moderate Pain  for up to 90 days.  Dispense: 120 tablet; Refill: 0        FOLLOW UP:    Return in about " March 13, 2024     [facility]  [address]  [address]  [place] [address]    Patient: Jenny Rodrigues   YOB: 1995   Date of Visit: 3/13/2024       Dear Dr. Gaitan:    Thank you for referring Jenny Rodrigues to me for evaluation. Below are my notes for this consultation.    If you have questions, please do not hesitate to call me. I look forward to following your patient along with you.         Sincerely,        Satnam Garcia PA-C        CC: No Recipients    Satnam Garcia PA-C  3/13/2024  1:58 PM  Incomplete  [facility] - Outpatient Progress Note  Jenny Rodrigues 29 y.o. female MRN: [id number]  Encounter: [id number]    Assessment and Plan    1. Epigastric pain  Get mesenteric doppler  To consider repeat HIDA  Get stool H.pylori    2. Nausea and vomiting, unspecified vomiting type  Repeat NM GES    3. Gastroesophageal reflux disease, unspecified whether esophagitis present  - pepcid AC      --------------------------------------------------------------------------------------------------------------------    Chief Complaint: Epigastric pain, nausea vomiting, greasy stool    HPI: Jenny Rodrigues is a 29 y.o. female with history of type 1 diabetes, anxiety/depression, Hashimoto's thyroiditis, undifferentiated connective tissue disease who presents for follow-up.  Last seen in the office on February 22, 2023 for intermittent right upper quadrant abdominal pain, nausea vomiting and stearrhea     Last EGD March 23, 2023 only showed mild gastritis.  Gastric biopsies negative for H. pylori and gastric intestinal metaplasia.  Duodenal biopsy negative for celiac disease.     She had normal gastric emptying study in 2021, normal UGI series 5/2022, normal RUQ ultrasound 9/2022, normal HIDA scan 10/2022.  Previous celiac disease antibody testing negative.  Normal colonoscopy in 10/2019.  Recent CT scan on January 14, 2024 for chest and  "abdominal pain with IV contrast showed no acute abnormality.  She did have a fecal elastase done on March 1, 2023 which was low at 135 suggesting moderate exocrine pancreatic insufficiency.  Creon not really helpful.   Some mild weight loss.  No diarrhea.    Endoscopy History:  EGD - March 23, 2023 only showed mild gastritis.  Gastric biopsies negative for H. pylori and gastric intestinal metaplasia.  Duodenal biopsy negative for celiac disease.  Colonoscopy -October 2019 normal    Review of Systems:   General: negative for fatigue, fever, night sweats or unexpected weight loss  Psychological: negative for anxiety or depression  Ophthalmic: negative for blurry vision or scleral icterus  ENT: negative for headaches, sore throat or dysphagia  Hematological and Lymphatic: negative for pallor or swollen lymph nodes  Respiratory: negative for cough, shortness of breath or wheezing  Cardiovascular: negative for chest pain, edema or murmur  Gastrointestinal: as mentioned in HPI  Genito-Urinary: negative for dysuria or incontinence  Musculoskeletal: negative for joint pain, joint stiffness or joint swelling  Dermatological: negative for pruritus, rash, or jaundice    Current Medications  Current Outpatient Medications   Medication Sig Dispense Refill   • BD Insulin Syringe U/F 31G X 5/16\" 0.5 ML MISC Use as directly with weekly methotrexate injection. 100 each 0   • Belimumab (BENLYSTA IV) Inject into a catheter in a vein every month to absorb continually Switching to monthly on 12/5     • cefdinir (OMNICEF) 300 mg capsule Take 1 capsule (300 mg total) by mouth every 12 (twelve) hours for 21 days 42 capsule 0   • clindamycin (CLEOCIN T) 1 % lotion Apply 1 application. topically 2 (two) times a day     • Cyanocobalamin 1000 MCG SUBL Place 1 tablet (1,000 mcg total) under the tongue daily 90 tablet 0   • famotidine (Pepcid) 20 mg tablet Take 20 mg by mouth 2 (two) times a day     • folic acid (FOLVITE) 1 mg tablet Take 1 " 3 months (around 1/28/2021) for Annual GYN Exam, Recheck/Medication 30 minute visit.      tablet (1 mg total) by mouth daily 90 tablet 3   • gabapentin (Neurontin) 600 MG tablet Take 1 tablet (600 mg total) by mouth daily 90 tablet 1   • Glucagon (Gvoke HypoPen 2-Pack) 1 MG/0.2ML SOAJ 1 mg PRN for severe hypoglycemia 0.4 mL 0   • hydroxychloroquine (PLAQUENIL) 200 mg tablet Take 1 tablet (200 mg total) by mouth 2 (two) times a day with meals 180 tablet 3   • Insulin Pen Needle (BD PEN NEEDLE NASIM U/F) 32G X 4 MM MISC by Does not apply route 4 (four) times a day for 180 days 400 each 3   • levonorgestrel-ethinyl estradiol (NORDETTE) 0.15-30 MG-MCG per tablet Take 1 tablet by mouth daily 84 tablet 1   • levothyroxine 25 mcg tablet Take 1 tablet (25 mcg total) by mouth daily 60 tablet 0   • LORazepam (Ativan) 0.5 mg tablet Take 1 tablet (0.5 mg total) by mouth 2 (two) times a day as needed for anxiety 60 tablet 0   • metFORMIN (GLUCOPHAGE-XR) 500 mg 24 hr tablet TAKE 1 TABLET BY MOUTH EVERY DAY WITH DINNER 90 tablet 1   • methotrexate 50 MG/2ML injection Inject 0.8 mL (20 mg total) under the skin once a week 8 mL 4   • NovoLOG 100 UNIT/ML injection Up to 100 units per day with insulin pump 90 mL 3   • OneTouch Verio test strip USE 4 TIMES A DAY BEFORE MEALS AND AT BEDTIME     • sodium chloride 1 g tablet Take 1 tablet (1 g total) by mouth if needed (for dizziness an lightheadendess in setting of low BP) 30 tablet 0   • Tresiba FlexTouch 100 units/mL injection pen Inject 30 Units under the skin daily 15 mL 0   • benzonatate (TESSALON PERLES) 100 mg capsule Take 1 capsule (100 mg total) by mouth 3 (three) times a day as needed for cough (Patient not taking: Reported on 3/13/2024) 20 capsule 0   • ciprofloxacin-dexamethasone (CIPRODEX) otic suspension Administer 4 drops into the left ear 2 (two) times a day (Patient not taking: Reported on 3/13/2024) 7.5 mL 0   • divalproex sodium (Depakote ER) 250 mg 24 hr tablet Take 2 tabs for 3 days, then 1 tab for 2 days (Patient not taking: Reported on 2/14/2024) 8  tablet 1   • Fluticasone-Salmeterol (Advair) 250-50 mcg/dose inhaler Inhale 1 puff 2 (two) times a day Rinse mouth after use. (Patient not taking: Reported on 3/13/2024) 60 blister 0   • glucagon 1 MG injection 1 mg (Patient not taking: Reported on 3/13/2024)     • Glucagon HCl (Glucagon Emergency) 1 MG/ML SOLR INJECT 1 MG AS DIRECTED AS NEEDED (WHEN PASSED OUT FROM LOW BLOOD SUGAR). (Patient not taking: Reported on 2/14/2024)     • guaifenesin-codeine (GUAIFENESIN AC) 100-10 MG/5ML liquid Take 5 mL by mouth 3 (three) times a day as needed for cough (Patient not taking: Reported on 3/13/2024) 150 mL 0   • Insulin Disposable Pump (Omnipod 5 G6 Pod, Gen 5,) MISC INJECT 1 EACH UNDER THE SKIN EVERY THIRD DAY. E10.65 (Patient not taking: Reported on 2/14/2024)     • insulin lispro (HumaLOG) 100 units/mL injection Inject 15 Units under the skin 3 (three) times a day before meals (Patient not taking: Reported on 2/14/2024) 10 mL 0   • miconazole 2 % cream Apply 1 Application topically 2 (two) times a day To affected area (Patient not taking: Reported on 3/13/2024)       No current facility-administered medications for this visit.       Past Medical History  Past Medical History:   Diagnosis Date   • Abdominal pain    • Anaphylaxis    • Anxiety    • Anxiety and depression    • COVID-19 12/2020   • Delayed emergence from anesthesia 03/23/2023    Severe episode of emergence delirium (confused, combative) after MAC anesthetic on 03/2023 for EGD. Received versed 2mg, >50mcg precedex, 5mg IV haldol, and 50mcg fentanyl. Waxing and waning episodes of agitation. Any future anesthetics should NOT be done at [facility].   • Delirium, induced by drug     anesthesia   • Depression    • Diabetes mellitus (HCC)    • Disease of thyroid gland     Hashimoto   • DKA, type 1 (HCC) 05/11/2021   • Eating disorder     history of anorexia/bulemia [id number]   • Food intolerance    • Fracture of fibula     R Salter I   • Gilbert disease    • Hashimoto's  disease 02/21/2020   • Head injury    • Headache(784.0)    • Nasal congestion    • PTSD (post-traumatic stress disorder)    • Rectal bleed    • Seizures (HCC)    • Type 1 diabetes (HCC)        Past Surgical History  Past Surgical History:   Procedure Laterality Date   • COLONOSCOPY     • EGD  03/23/2023   • KNEE SURGERY Right 07/06/2020   • NASAL SEPTOPLASTY W/ TURBINOPLASTY     • SINUS SURGERY     • SKIN BIOPSY     • TURBINOPLASTY N/A 03/22/2021    Procedure: TURBINOPLASTY;  Surgeon: Jn Hidalgo MD;  Location: [facility];  Service: ENT   • UPPER GASTROINTESTINAL ENDOSCOPY     • WISDOM TOOTH EXTRACTION     • WRIST SURGERY      left; Excision of ganglion       Past Social History   Social History     Socioeconomic History   • Marital status: /Civil Union     Spouse name: None   • Number of children: 0   • Years of education: None   • Highest education level: Associate degree: academic program   Occupational History   • Occupation: unemployed   Tobacco Use   • Smoking status: Never     Passive exposure: Never   • Smokeless tobacco: Never   • Tobacco comments:     Tobacco smoke exposure (Father smokes cigars)   Vaping Use   • Vaping status: Never Used   Substance and Sexual Activity   • Alcohol use: Yes     Comment: rarely   • Drug use: Never   • Sexual activity: Yes     Partners: Male     Birth control/protection: Condom Male, OCP   Other Topics Concern   • None   Social History Narrative    Student at [employer]    Reports a poor diet; low in vegetables, high in sweets    Dental care, regularly    Lives with parents    Sleeps 8-10 hours a day        Do you have pets? Dog,cat Is pet allowed in bedroom?Yes    Are you a smoker? Never    Does anyone smoke in your home? No       Do you live with smokers? Yes    Travel [place] frequently? No   How many times a year? N/A      Social Determinants of Health     Financial Resource Strain: Low Risk  (7/8/2023)    Received from [facility]    Overall  "Financial Resource Strain (CARDIA)    • Difficulty of Paying Living Expenses: Not hard at all   Food Insecurity: No Food Insecurity (1/16/2024)    Hunger Vital Sign    • Worried About Running Out of Food in the Last Year: Never true    • Ran Out of Food in the Last Year: Never true   Transportation Needs: No Transportation Needs (1/16/2024)    PRAPARE - Transportation    • Lack of Transportation (Medical): No    • Lack of Transportation (Non-Medical): No   Physical Activity: Insufficiently Active (8/7/2020)    Exercise Vital Sign    • Days of Exercise per Week: 7 days    • Minutes of Exercise per Session: 10 min   Stress: Stress Concern Present (8/7/2020)    [employer] [place] [employer] - Occupational Stress Questionnaire    • Feeling of Stress : Rather much   Social Connections: Unknown (8/7/2020)    Social Connection and Isolation Panel [NHANES]    • Frequency of Communication with Friends and Family: More than three times a week    • Frequency of Social Gatherings with Friends and Family: Not on file    • Attends [employer] Services: Not on file    • Active Member of Clubs or Organizations: No    • Attends Club or Organization Meetings: Never    • Marital Status: Never    Intimate Partner Violence: Not At Risk (7/8/2023)    Received from     Humiliation, Afraid, Rape, and Kick questionnaire    • Fear of Current or Ex-Partner: No    • Emotionally Abused: No    • Physically Abused: No    • Sexually Abused: No   Housing Stability: Unknown (1/16/2024)    Housing Stability Vital Sign    • Unable to Pay for Housing in the Last Year: No    • Number of Places Lived in the Last Year: Not on file    • Unstable Housing in the Last Year: No       Vital Signs  Vitals:    03/13/24 1335   BP: 103/78   BP Location: Left arm   Patient Position: Sitting   Cuff Size: Standard   Pulse: 101   SpO2: 98%   Weight: 59.4 kg (131 lb)   Height: 5' 1\" (1.549 m)       Physical Exam:  General " appearance: alert, cooperative, no distress  HEENT: normocephalic, anicteric, no eye erythema or discharge, no oropharyngeal thrush  Neck: supple  Lungs: CTA b/l, no rales, rhonchi, or wheezing, unlabored respirations  Heart: RRR, no murmur, rubs, or gallops  Abdomen: soft, non-tender, non-distended, normal bowel sounds, no masses or organomegaly  Rectal: deferred  Extremities: no cyanosis, clubbing, or edema  Musculoskeletal: normal gait  Skin: color and texture normal, no jaundice, no rashes or lesions  Psychiatric: alert and oriented, normal affect and behavior                                                          Satnam Garcia PA-C  3/13/2024  1:42 PM  Sign when Signing Visit  [facility] - Outpatient Progress Note  Jenny Rodrigues 29 y.o. female MRN: [id number]  Encounter: [id number]    Assessment and Plan    1. Epigastric pain  ***    2. Nausea and vomiting, unspecified vomiting type  ***    3. Gastroesophageal reflux disease, unspecified whether esophagitis present  ***      --------------------------------------------------------------------------------------------------------------------    Chief Complaint: Epigastric pain, nausea vomiting, greasy stool    HPI: Jenny Rodrigues is a 29 y.o. female with history of type 1 diabetes, anxiety/depression, Hashimoto's thyroiditis, undifferentiated connective tissue disease who presents for follow-up.  Last seen in the office on February 22, 2023 for intermittent right upper quadrant abdominal pain, nausea vomiting and stearrhea     Last EGD March 23, 2023 only showed mild gastritis.  Gastric biopsies negative for H. pylori and gastric intestinal metaplasia.  Duodenal biopsy negative for celiac disease.     She had normal gastric emptying study in 2021, normal UGI series 5/2022, normal RUQ ultrasound 9/2022, normal HIDA scan 10/2022.  Previous celiac disease antibody testing negative.  Normal  "colonoscopy in 10/2019.  Recent CT scan on January 14, 2024 for chest and abdominal pain with IV contrast showed no acute abnormality.  She did have a fecal elastase done on March 1, 2023 which was low at 135 suggesting moderate exocrine pancreatic insufficiency    Endoscopy History:  EGD - March 23, 2023 only showed mild gastritis.  Gastric biopsies negative for H. pylori and gastric intestinal metaplasia.  Duodenal biopsy negative for celiac disease.  Colonoscopy -October 2019 normal    Review of Systems:   General: negative for fatigue, fever, night sweats or unexpected weight loss  Psychological: negative for anxiety or depression  Ophthalmic: negative for blurry vision or scleral icterus  ENT: negative for headaches, sore throat or dysphagia  Hematological and Lymphatic: negative for pallor or swollen lymph nodes  Respiratory: negative for cough, shortness of breath or wheezing  Cardiovascular: negative for chest pain, edema or murmur  Gastrointestinal: as mentioned in HPI  Genito-Urinary: negative for dysuria or incontinence  Musculoskeletal: negative for joint pain, joint stiffness or joint swelling  Dermatological: negative for pruritus, rash, or jaundice    Current Medications  Current Outpatient Medications   Medication Sig Dispense Refill   • BD Insulin Syringe U/F 31G X 5/16\" 0.5 ML MISC Use as directly with weekly methotrexate injection. 100 each 0   • Belimumab (BENLYSTA IV) Inject into a catheter in a vein every month to absorb continually Switching to monthly on 12/5     • cefdinir (OMNICEF) 300 mg capsule Take 1 capsule (300 mg total) by mouth every 12 (twelve) hours for 21 days 42 capsule 0   • clindamycin (CLEOCIN T) 1 % lotion Apply 1 application. topically 2 (two) times a day     • Cyanocobalamin 1000 MCG SUBL Place 1 tablet (1,000 mcg total) under the tongue daily 90 tablet 0   • famotidine (Pepcid) 20 mg tablet Take 20 mg by mouth 2 (two) times a day     • folic acid (FOLVITE) 1 mg tablet Take " 1 tablet (1 mg total) by mouth daily 90 tablet 3   • gabapentin (Neurontin) 600 MG tablet Take 1 tablet (600 mg total) by mouth daily 90 tablet 1   • Glucagon (Gvoke HypoPen 2-Pack) 1 MG/0.2ML SOAJ 1 mg PRN for severe hypoglycemia 0.4 mL 0   • hydroxychloroquine (PLAQUENIL) 200 mg tablet Take 1 tablet (200 mg total) by mouth 2 (two) times a day with meals 180 tablet 3   • Insulin Pen Needle (BD PEN NEEDLE NASIM U/F) 32G X 4 MM MISC by Does not apply route 4 (four) times a day for 180 days 400 each 3   • levonorgestrel-ethinyl estradiol (NORDETTE) 0.15-30 MG-MCG per tablet Take 1 tablet by mouth daily 84 tablet 1   • levothyroxine 25 mcg tablet Take 1 tablet (25 mcg total) by mouth daily 60 tablet 0   • LORazepam (Ativan) 0.5 mg tablet Take 1 tablet (0.5 mg total) by mouth 2 (two) times a day as needed for anxiety 60 tablet 0   • metFORMIN (GLUCOPHAGE-XR) 500 mg 24 hr tablet TAKE 1 TABLET BY MOUTH EVERY DAY WITH DINNER 90 tablet 1   • methotrexate 50 MG/2ML injection Inject 0.8 mL (20 mg total) under the skin once a week 8 mL 4   • NovoLOG 100 UNIT/ML injection Up to 100 units per day with insulin pump 90 mL 3   • OneTouch Verio test strip USE 4 TIMES A DAY BEFORE MEALS AND AT BEDTIME     • sodium chloride 1 g tablet Take 1 tablet (1 g total) by mouth if needed (for dizziness an lightheadendess in setting of low BP) 30 tablet 0   • Tresiba FlexTouch 100 units/mL injection pen Inject 30 Units under the skin daily 15 mL 0   • benzonatate (TESSALON PERLES) 100 mg capsule Take 1 capsule (100 mg total) by mouth 3 (three) times a day as needed for cough (Patient not taking: Reported on 3/13/2024) 20 capsule 0   • ciprofloxacin-dexamethasone (CIPRODEX) otic suspension Administer 4 drops into the left ear 2 (two) times a day (Patient not taking: Reported on 3/13/2024) 7.5 mL 0   • divalproex sodium (Depakote ER) 250 mg 24 hr tablet Take 2 tabs for 3 days, then 1 tab for 2 days (Patient not taking: Reported on 2/14/2024) 8  tablet 1   • Fluticasone-Salmeterol (Advair) 250-50 mcg/dose inhaler Inhale 1 puff 2 (two) times a day Rinse mouth after use. (Patient not taking: Reported on 3/13/2024) 60 blister 0   • glucagon 1 MG injection 1 mg (Patient not taking: Reported on 3/13/2024)     • Glucagon HCl (Glucagon Emergency) 1 MG/ML SOLR INJECT 1 MG AS DIRECTED AS NEEDED (WHEN PASSED OUT FROM LOW BLOOD SUGAR). (Patient not taking: Reported on 2/14/2024)     • guaifenesin-codeine (GUAIFENESIN AC) 100-10 MG/5ML liquid Take 5 mL by mouth 3 (three) times a day as needed for cough (Patient not taking: Reported on 3/13/2024) 150 mL 0   • Insulin Disposable Pump (Omnipod 5 G6 Pod, Gen 5,) MISC INJECT 1 EACH UNDER THE SKIN EVERY THIRD DAY. E10.65 (Patient not taking: Reported on 2/14/2024)     • insulin lispro (HumaLOG) 100 units/mL injection Inject 15 Units under the skin 3 (three) times a day before meals (Patient not taking: Reported on 2/14/2024) 10 mL 0   • miconazole 2 % cream Apply 1 Application topically 2 (two) times a day To affected area (Patient not taking: Reported on 3/13/2024)       No current facility-administered medications for this visit.       Past Medical History  Past Medical History:   Diagnosis Date   • Abdominal pain    • Anaphylaxis    • Anxiety    • Anxiety and depression    • COVID-19 12/2020   • Delayed emergence from anesthesia 03/23/2023    Severe episode of emergence delirium (confused, combative) after MAC anesthetic on 03/2023 for EGD. Received versed 2mg, >50mcg precedex, 5mg IV haldol, and 50mcg fentanyl. Waxing and waning episodes of agitation. Any future anesthetics should NOT be done at [facility].   • Delirium, induced by drug     anesthesia   • Depression    • Diabetes mellitus (HCC)    • Disease of thyroid gland     Hashimoto   • DKA, type 1 (HCC) 05/11/2021   • Eating disorder     history of anorexia/bulemia [id number]   • Food intolerance    • Fracture of fibula     R Salter I   • Gilbert disease    • Hashimoto's  disease 02/21/2020   • Head injury    • Headache(784.0)    • Nasal congestion    • PTSD (post-traumatic stress disorder)    • Rectal bleed    • Seizures (HCC)    • Type 1 diabetes (HCC)        Past Surgical History  Past Surgical History:   Procedure Laterality Date   • COLONOSCOPY     • EGD  03/23/2023   • KNEE SURGERY Right 07/06/2020   • NASAL SEPTOPLASTY W/ TURBINOPLASTY     • SINUS SURGERY     • SKIN BIOPSY     • TURBINOPLASTY N/A 03/22/2021    Procedure: TURBINOPLASTY;  Surgeon: Jn Hidalgo MD;  Location: [facility];  Service: ENT   • UPPER GASTROINTESTINAL ENDOSCOPY     • WISDOM TOOTH EXTRACTION     • WRIST SURGERY      left; Excision of ganglion       Past Social History   Social History     Socioeconomic History   • Marital status: /Civil Union     Spouse name: None   • Number of children: 0   • Years of education: None   • Highest education level: Associate degree: academic program   Occupational History   • Occupation: unemployed   Tobacco Use   • Smoking status: Never     Passive exposure: Never   • Smokeless tobacco: Never   • Tobacco comments:     Tobacco smoke exposure (Father smokes cigars)   Vaping Use   • Vaping status: Never Used   Substance and Sexual Activity   • Alcohol use: Yes     Comment: rarely   • Drug use: Never   • Sexual activity: Yes     Partners: Male     Birth control/protection: Condom Male, OCP   Other Topics Concern   • None   Social History Narrative    Student at [employer]    Reports a poor diet; low in vegetables, high in sweets    Dental care, regularly    Lives with parents    Sleeps 8-10 hours a day        Do you have pets? Dog,cat Is pet allowed in bedroom?Yes    Are you a smoker? Never    Does anyone smoke in your home? No       Do you live with smokers? Yes    [place] frequently? No   How many times a year? N/A      Social Determinants of Health     Financial Resource Strain: Low Risk  (7/8/2023)    Received from [facility]    Overall  "Financial Resource Strain (CARDIA)    • Difficulty of Paying Living Expenses: Not hard at all   Food Insecurity: No Food Insecurity (1/16/2024)    Hunger Vital Sign    • Worried About Running Out of Food in the Last Year: Never true    • Ran Out of Food in the Last Year: Never true   Transportation Needs: No Transportation Needs (1/16/2024)    PRAPARE - Transportation    • Lack of Transportation (Medical): No    • Lack of Transportation (Non-Medical): No   Physical Activity: Insufficiently Active (8/7/2020)    Exercise Vital Sign    • Days of Exercise per Week: 7 days    • Minutes of Exercise per Session: 10 min   Stress: Stress Concern Present (8/7/2020)    [employer] - Occupational Stress Questionnaire    • Feeling of Stress : Rather much   Social Connections: Unknown (8/7/2020)    Social Connection and Isolation Panel [NHANES]    • Frequency of Communication with Friends and Family: More than three times a week    • Frequency of Social Gatherings with Friends and Family: Not on file    • Attends Temple Services: Not on file    • Active Member of Clubs or Organizations: No    • Attends Club or Organization Meetings: Never    • Marital Status: Never    Intimate Partner Violence: Not At Risk (7/8/2023)    Received from [facility]    Humiliation, Afraid, Rape, and Kick questionnaire    • Fear of Current or Ex-Partner: No    • Emotionally Abused: No    • Physically Abused: No    • Sexually Abused: No   Housing Stability: Unknown (1/16/2024)    Housing Stability Vital Sign    • Unable to Pay for Housing in the Last Year: No    • Number of Places Lived in the Last Year: Not on file    • Unstable Housing in the Last Year: No       Vital Signs  Vitals:    03/13/24 1335   BP: 103/78   BP Location: Left arm   Patient Position: Sitting   Cuff Size: Standard   Pulse: 101   SpO2: 98%   Weight: 59.4 kg (131 lb)   Height: 5' 1\" (1.549 m)       Physical Exam:  General " appearance: alert, cooperative, no distress  HEENT: normocephalic, anicteric, no eye erythema or discharge, no oropharyngeal thrush  Neck: supple  Lungs: CTA b/l, no rales, rhonchi, or wheezing, unlabored respirations  Heart: RRR, no murmur, rubs, or gallops  Abdomen: soft, non-tender, non-distended, normal bowel sounds, no masses or organomegaly  Rectal: deferred  Extremities: no cyanosis, clubbing, or edema  Musculoskeletal: normal gait  Skin: color and texture normal, no jaundice, no rashes or lesions  Psychiatric: alert and oriented, normal affect and behavior                                                          Satnam Garcia PA-C

## 2024-03-14 ENCOUNTER — DOCUMENTATION (OUTPATIENT)
Dept: BEHAVIORAL/MENTAL HEALTH CLINIC | Facility: CLINIC | Age: 29
End: 2024-03-14

## 2024-03-14 DIAGNOSIS — F41.0 GENERALIZED ANXIETY DISORDER WITH PANIC ATTACKS: ICD-10-CM

## 2024-03-14 DIAGNOSIS — F43.12 CHRONIC POST-TRAUMATIC STRESS DISORDER (PTSD): Primary | ICD-10-CM

## 2024-03-14 DIAGNOSIS — F31.62 BIPOLAR DISORDER, CURRENT EPISODE MIXED, MODERATE (HCC): ICD-10-CM

## 2024-03-14 DIAGNOSIS — F42.2 MIXED OBSESSIONAL THOUGHTS AND ACTS: ICD-10-CM

## 2024-03-14 DIAGNOSIS — F41.1 GENERALIZED ANXIETY DISORDER WITH PANIC ATTACKS: ICD-10-CM

## 2024-03-14 NOTE — PROGRESS NOTES
"Psychotherapy Discharge Summary    Preferred Name: Jenny Rodrigues  YOB: 1995    Admission date to psychotherapy: 09/14/2023    Referred by: Yane Martinez LCSW    Presenting Problem: Chris reports, \"For me one of the main one is culture and I know she has an illness but I don't know how to deal with it and I try to understand her mental and PTSD.\" Chris reports, \"Another thing when we try to argue she brings the old stuff back and questions I can't answer to.\" Chris reports that due to Jenny's past trauma history with her dad he feels that Jenny will take those past hurts and feelings out on him. Chris reports the difficulty with blending cultures.     Jenny reports, \"I feel disrespected a lot and I think a lot of it is cultural.\" Jenny reports, \"He has and temper things get broken and he will push me and that happens with the big arguments and it goes to far and he loses it.\" Jenny also reports communication issues between she and Chris. Jenny reports that when she became ill four years ago she reports that she changed. Jenny confirms that she brings things up from the past, \"I feel that things have not been resolved.\" Jenny reports that at times \"I feel abandoned and I feel that he abandons me.\"     Course of treatment included : individual therapy     Progress/Outcome of Treatment Goals (brief summary of course of treatment) Jenny and Chris actively participated in treatment making progress towards their treatment goals.     Treatment Complications (if any): None     Treatment Progress: good    Current [facility] Psychiatric Provider: Marline Ro MD    Discharge Medications include: Ativan 0.5 mg    Discharge Date: 03/14/2024    Discharge Diagnosis:   1. Chronic post-traumatic stress disorder (PTSD)        2. Generalized anxiety disorder with panic attacks        3. Mixed obsessional thoughts and acts        4. Bipolar disorder, current episode mixed, moderate " (HCC)            Criteria for Discharge:  Jenny's  obtained employment.    Aftercare recommendations include (include specific referral names and phone numbers, if appropriate): N/A    Prognosis: good

## 2024-03-15 ENCOUNTER — SOCIAL WORK (OUTPATIENT)
Dept: BEHAVIORAL/MENTAL HEALTH CLINIC | Facility: CLINIC | Age: 29
End: 2024-03-15

## 2024-03-15 DIAGNOSIS — F43.12 CHRONIC POST-TRAUMATIC STRESS DISORDER (PTSD): Primary | ICD-10-CM

## 2024-03-15 DIAGNOSIS — F41.0 GENERALIZED ANXIETY DISORDER WITH PANIC ATTACKS: ICD-10-CM

## 2024-03-15 DIAGNOSIS — F31.62 BIPOLAR DISORDER, CURRENT EPISODE MIXED, MODERATE (HCC): ICD-10-CM

## 2024-03-15 DIAGNOSIS — F42.2 MIXED OBSESSIONAL THOUGHTS AND ACTS: ICD-10-CM

## 2024-03-15 DIAGNOSIS — F41.1 GENERALIZED ANXIETY DISORDER WITH PANIC ATTACKS: ICD-10-CM

## 2024-03-15 DIAGNOSIS — M32.8 OTHER FORMS OF SYSTEMIC LUPUS ERYTHEMATOSUS, UNSPECIFIED ORGAN INVOLVEMENT STATUS (HCC): Primary | ICD-10-CM

## 2024-03-15 RX ORDER — SODIUM CHLORIDE 9 MG/ML
20 INJECTION, SOLUTION INTRAVENOUS ONCE
Status: CANCELLED | OUTPATIENT
Start: 2024-03-19

## 2024-03-15 RX ORDER — DIPHENHYDRAMINE HCL 25 MG
25 TABLET ORAL ONCE
Status: CANCELLED | OUTPATIENT
Start: 2024-03-19

## 2024-03-15 RX ORDER — ACETAMINOPHEN 325 MG/1
650 TABLET ORAL ONCE
Status: CANCELLED | OUTPATIENT
Start: 2024-03-19

## 2024-03-15 NOTE — PSYCH
"Behavioral Health Psychotherapy Progress Note    Psychotherapy Provided: Individual Psychotherapy     1. Chronic post-traumatic stress disorder (PTSD)        2. Mixed obsessional thoughts and acts        3. Generalized anxiety disorder with panic attacks        4. Bipolar disorder, current episode mixed, moderate (HCC)            Goals addressed in session: Goal 1 and Goal 2     DATA: Met with Jenny individually. 'I am still very depressed'. Denied stress.  Sleeping 12+ hrs a day, napping, crying frequently.  Feels was in a 'manic like state' for so long and she got a lot done.  Very discouraged in current state. Started work - working very few hours due to needing additional paperwork completed for new position.  Feels more comfortable there and sees it being less stress. Marriage going well.  Feels Chris has been more supportive this depressive cycle.  Going to [place] in a few weeks as Chris has a training there - will spend time together in evening.  Jenny will work from [employer].  Denied SI  During this session, this clinician used the following therapeutic modalities: Client-centered Therapy, Cognitive Behavioral Therapy, Cognitive Processing Therapy, and Supportive Psychotherapy    Substance Abuse was not addressed during this session. If the client is diagnosed with a co-occurring substance use disorder, please indicate any changes in the frequency or amount of use: . Stage of change for addressing substance use diagnoses: No substance use/Not applicable    ASSESSMENT:  Jenny Rodrigues presents with a Depressed mood.     her affect is Flat, which is congruent, with her mood and the content of the session. The client has made progress on their goals.     Jenny Rodrigues presents with a low risk of suicide, low risk of self-harm, and low risk of harm to others.    For any risk assessment that surpasses a \"low\" rating, a safety plan must be developed.    A safety plan was indicated: no  If yes, describe in " detail     PLAN: Between sessions, Jenny Rodrigues will monitor depressive symptoms, continue work, communication with Chris. At the next session, the therapist will use Client-centered Therapy, Cognitive Behavioral Therapy, Cognitive Processing Therapy, and Supportive Psychotherapy to address work, depression, .    Behavioral Health Treatment Plan and Discharge Planning: Jenny Rodrigues is aware of and agrees to continue to work on their treatment plan. They have identified and are working toward their discharge goals. yes    Visit start and stop times:    03/15/24  Start Time: 1400  Stop Time: 1455  Total Visit Time: 55 minutes

## 2024-03-19 ENCOUNTER — HOSPITAL ENCOUNTER (OUTPATIENT)
Dept: INFUSION CENTER | Facility: CLINIC | Age: 29
Discharge: HOME | End: 2024-03-19
Payer: COMMERCIAL

## 2024-03-19 VITALS
SYSTOLIC BLOOD PRESSURE: 112 MMHG | DIASTOLIC BLOOD PRESSURE: 77 MMHG | HEART RATE: 87 BPM | OXYGEN SATURATION: 99 % | WEIGHT: 131 LBS | RESPIRATION RATE: 16 BRPM | TEMPERATURE: 96.6 F | BODY MASS INDEX: 24.75 KG/M2

## 2024-03-19 DIAGNOSIS — M32.8 OTHER FORMS OF SYSTEMIC LUPUS ERYTHEMATOSUS, UNSPECIFIED ORGAN INVOLVEMENT STATUS (HCC): Primary | ICD-10-CM

## 2024-03-19 PROCEDURE — 96413 CHEMO IV INFUSION 1 HR: CPT

## 2024-03-19 RX ORDER — ACETAMINOPHEN 325 MG/1
650 TABLET ORAL ONCE
OUTPATIENT
Start: 2024-04-16

## 2024-03-19 RX ORDER — DIPHENHYDRAMINE HCL 25 MG
25 TABLET ORAL ONCE
OUTPATIENT
Start: 2024-04-16

## 2024-03-19 RX ORDER — SODIUM CHLORIDE 9 MG/ML
20 INJECTION, SOLUTION INTRAVENOUS ONCE
Status: COMPLETED | OUTPATIENT
Start: 2024-03-19 | End: 2024-03-19

## 2024-03-19 RX ORDER — DIPHENHYDRAMINE HCL 25 MG
25 TABLET ORAL ONCE
Status: COMPLETED | OUTPATIENT
Start: 2024-03-19 | End: 2024-03-19

## 2024-03-19 RX ORDER — ACETAMINOPHEN 325 MG/1
650 TABLET ORAL ONCE
Status: COMPLETED | OUTPATIENT
Start: 2024-03-19 | End: 2024-03-19

## 2024-03-19 RX ORDER — SODIUM CHLORIDE 9 MG/ML
20 INJECTION, SOLUTION INTRAVENOUS ONCE
OUTPATIENT
Start: 2024-04-16

## 2024-03-19 RX ADMIN — BELIMUMAB 600 MG: 120 INJECTION, POWDER, LYOPHILIZED, FOR SOLUTION INTRAVENOUS at 16:19

## 2024-03-19 RX ADMIN — ACETAMINOPHEN 650 MG: 325 TABLET ORAL at 15:29

## 2024-03-19 RX ADMIN — DIPHENHYDRAMINE HYDROCHLORIDE 25 MG: 25 TABLET ORAL at 15:29

## 2024-03-19 RX ADMIN — SODIUM CHLORIDE 20 ML/HR: 0.9 INJECTION, SOLUTION INTRAVENOUS at 15:20

## 2024-03-19 NOTE — LETTER
[facility]  [address]  [address]  [place] [address]  Dept: [phone]    March 19, 2024     Patient: Jenny Rodrigues   YOB: 1995   Date of Visit: 3/19/2024       To Whom it May Concern:    Jenny Rodrigues is under my professional care. She was seen in the hospital from 3/19/2024 to 03/19/24.     If you have any questions or concerns, please don't hesitate to call.         Sincerely,          Yane Overton RN

## 2024-03-19 NOTE — PROGRESS NOTES
Patient tolerated Benlysta infusion without incident. Next appointment made for 4/16 at 3pm. Declined AVS.

## 2024-03-20 ENCOUNTER — TELEPHONE (OUTPATIENT)
Dept: PSYCHIATRY | Facility: CLINIC | Age: 29
End: 2024-03-20

## 2024-03-22 ENCOUNTER — SOCIAL WORK (OUTPATIENT)
Dept: BEHAVIORAL/MENTAL HEALTH CLINIC | Facility: CLINIC | Age: 29
End: 2024-03-22

## 2024-03-22 DIAGNOSIS — F42.2 MIXED OBSESSIONAL THOUGHTS AND ACTS: Primary | ICD-10-CM

## 2024-03-22 DIAGNOSIS — F41.0 GENERALIZED ANXIETY DISORDER WITH PANIC ATTACKS: ICD-10-CM

## 2024-03-22 DIAGNOSIS — F43.12 CHRONIC POST-TRAUMATIC STRESS DISORDER (PTSD): ICD-10-CM

## 2024-03-22 DIAGNOSIS — F31.62 BIPOLAR DISORDER, CURRENT EPISODE MIXED, MODERATE (HCC): ICD-10-CM

## 2024-03-22 DIAGNOSIS — F41.1 GENERALIZED ANXIETY DISORDER WITH PANIC ATTACKS: ICD-10-CM

## 2024-03-22 NOTE — PSYCH
"Behavioral Health Psychotherapy Progress Note    Psychotherapy Provided: Individual Psychotherapy     1. Mixed obsessional thoughts and acts        2. Generalized anxiety disorder with panic attacks        3. Chronic post-traumatic stress disorder (PTSD)        4. Bipolar disorder, current episode mixed, moderate (HCC)            Goals addressed in session: Goal 1 and Goal 2     DATA: Met with Jenny individually.  Doing well at work - they are very patient.  Jenny states her ruminating thoughts are getting worse 'about nothing. Just spionning'. Impacting getting to sleep. Jenny discussed her fear that there is a new medical symptom - experiencing tremors and brain fog. Going to [place] next week with Chris for his training.  Relationship going well - Jenny thinks Chris's parents spoke to him after they saw her in the hospital and this wasn't a 'fake disease'.  Chris's grandfather is [place] - Chris devastated - Jenny is looking at [employer] to [place] - Chris's paperwork is being expedited. Denied SI  During this session, this clinician used the following therapeutic modalities: Client-centered Therapy, Cognitive Behavioral Therapy, Cognitive Processing Therapy, and Supportive Psychotherapy    Substance Abuse was not addressed during this session. If the client is diagnosed with a co-occurring substance use disorder, please indicate any changes in the frequency or amount of use: . Stage of change for addressing substance use diagnoses: No substance use/Not applicable    ASSESSMENT:  Jenny Rodrigues presents with a Anxious and Labile mood.     her affect is Normal range and intensity, which is congruent, with her mood and the content of the session. The client has made progress on their goals.     Jenny Rodrigues presents with a low risk of suicide, low risk of self-harm, and low risk of harm to others.    For any risk assessment that surpasses a \"low\" rating, a safety plan must be developed.    A " safety plan was indicated: no  If yes, describe in detail     PLAN: Between sessions, Jenny Rodrigues will continue strengthening marriage bond in [place], continue job, look at [employer] for [place]. At the next session, the therapist will use Client-centered Therapy, Cognitive Behavioral Therapy, Cognitive Processing Therapy, and Supportive Psychotherapy to address above topics..    Behavioral Health Treatment Plan and Discharge Planning: Jenny Rodrigues is aware of and agrees to continue to work on their treatment plan. They have identified and are working toward their discharge goals. yes    Visit start and stop times:    03/22/24  Start Time: 1400  Stop Time: 1455  Total Visit Time: 55 minutes

## 2024-03-25 NOTE — TELEPHONE ENCOUNTER
DISCHARGE LETTER for Stuart Agee LCSW (certified and regular) placed in outgoing mail on 03/25/24.    Article #:  [id number]    Address:  [address] [place] [address]  [place] [id number]

## 2024-04-04 ENCOUNTER — SOCIAL WORK (OUTPATIENT)
Dept: BEHAVIORAL/MENTAL HEALTH CLINIC | Facility: CLINIC | Age: 29
End: 2024-04-04

## 2024-04-04 DIAGNOSIS — F43.12 CHRONIC POST-TRAUMATIC STRESS DISORDER (PTSD): ICD-10-CM

## 2024-04-04 DIAGNOSIS — F42.2 MIXED OBSESSIONAL THOUGHTS AND ACTS: ICD-10-CM

## 2024-04-04 DIAGNOSIS — F41.0 GENERALIZED ANXIETY DISORDER WITH PANIC ATTACKS: Primary | ICD-10-CM

## 2024-04-04 DIAGNOSIS — F41.1 GENERALIZED ANXIETY DISORDER WITH PANIC ATTACKS: Primary | ICD-10-CM

## 2024-04-04 DIAGNOSIS — F31.62 BIPOLAR DISORDER, CURRENT EPISODE MIXED, MODERATE (HCC): ICD-10-CM

## 2024-04-04 NOTE — PSYCH
Behavioral Health Psychotherapy Progress Note    Psychotherapy Provided: Individual Psychotherapy     1. Generalized anxiety disorder with panic attacks        2. Mixed obsessional thoughts and acts        3. Bipolar disorder, current episode mixed, moderate (HCC)        4. Chronic post-traumatic stress disorder (PTSD)            Goals addressed in session: Goal 1 and Goal 2     DATA: Met with Jenny individually.  'I haven't been doing well over past 2 weeks'.  Feels depressed and angry all the time. Started taking Mg power which has been helpful with calming racing thoughts - still present but 'takes a few away'.  Went to [place] with Chris for his training - a few arguments occurred.  Common focus was Jenny feeling she needs routine and attention from Chris 'I don't feel that's unreasonable'. Jenny states she sleeps most of the day 'my body is beat up and very tired'.  Did resume infusions once antibiotics were complete for 3 days. Taking Phenergan due to ongoing nausea 'I feel I am a mess all the time'.  Feels people only know she exists if she texts them - no one reaches out to Jenny. Denied SI  During this session, this clinician used the following therapeutic modalities: Client-centered Therapy, Cognitive Behavioral Therapy, Cognitive Processing Therapy, and Supportive Psychotherapy    Substance Abuse was not addressed during this session. If the client is diagnosed with a co-occurring substance use disorder, please indicate any changes in the frequency or amount of use: . Stage of change for addressing substance use diagnoses: No substance use/Not applicable    ASSESSMENT:  Jenny Rodrigues presents with a Anxious, Depressed, and Stoic  mood.     her affect is Flat, which is congruent, with her mood and the content of the session. The client has made progress on their goals.     Jenny Rodrigues presents with a low risk of suicide, low risk of self-harm, and low risk of harm to others.    For any  "risk assessment that surpasses a \"low\" rating, a safety plan must be developed.    A safety plan was indicated: no  If yes, describe in detail     PLAN: Between sessions, Jenny Rodrigues will attempt to engage in different activities at home to avoid sleeping too much, continue to attend training's for work. At the next session, the therapist will use Client-centered Therapy, Cognitive Behavioral Therapy, Cognitive Processing Therapy, and Supportive Psychotherapy to address depression, socialization, health, marriage, new job.    Behavioral Health Treatment Plan and Discharge Planning: Jenny oRdrigues is aware of and agrees to continue to work on their treatment plan. They have identified and are working toward their discharge goals. yes    Visit start and stop times:    04/04/24  Start Time: 1000  Stop Time: 1055  Total Visit Time: 55 minutes  "

## 2024-04-09 ENCOUNTER — HOSPITAL ENCOUNTER (OUTPATIENT)
Dept: NON INVASIVE DIAGNOSTICS | Facility: CLINIC | Age: 29
Discharge: HOME | End: 2024-04-09
Payer: COMMERCIAL

## 2024-04-09 DIAGNOSIS — R10.13 EPIGASTRIC PAIN: ICD-10-CM

## 2024-04-09 DIAGNOSIS — R11.2 NAUSEA AND VOMITING, UNSPECIFIED VOMITING TYPE: ICD-10-CM

## 2024-04-09 PROCEDURE — 93975 VASCULAR STUDY: CPT

## 2024-04-09 PROCEDURE — 93975 VASCULAR STUDY: CPT | Performed by: SURGERY

## 2024-04-15 ENCOUNTER — APPOINTMENT (OUTPATIENT)
Dept: LAB | Age: 29
End: 2024-04-15

## 2024-04-15 ENCOUNTER — NURSE TRIAGE (OUTPATIENT)
Age: 29
End: 2024-04-15

## 2024-04-15 NOTE — TELEPHONE ENCOUNTER
"Last OV 3/13/24 KARI Garcia PA-C for Epigastric Pain, Nausea and Vomiting, GERD  Next OV 5/2/24  EGD 3/23/23  VAS Celiac/Mesenteric Duplex 4/9/24  NM GES  4/17/24    Pt reports she green bile x 3 today. Last episode around 11 am. Feels pain in her lower throat, chest which radiates back. Typically after meals however constant 4/10 pain today.  States there is some labored breathing due to discomfort. Has been taking sips of water. Had eggs this am; does not feel food got stuck. Off Pepcid as she is due to complete H Pylori stool test. Also states she has been very gassy which is uncommon for her. States BM's over the weekend have been soft stool to very loose. Notes mucus when she passes gas while on toilet. States muus will float and stool light, tan brown. Denies blood or dark/tarry stools.    Has phenergan; will try if she continues with nausea/vomiting. Advised to remain on clears and advance as tolerated to bland diet. Pt diabetic and noted elevated BG; will try Glucerna. Discussed OTC for gas, indigestion.    Denies cardiac symptoms. States the only other new symptom she has noted over the past few days are electric shocks to extremites. Pt will be calling Neuro d/t Hx of TIA.     Pt was advised to procieed to ED for any worsening/cardiac symptoms. Pt agreeable.    Please advise if you have further recommendations.    Pt [phone]  Reason for Disposition   Abdominal pain is a chronic symptom (recurrent or ongoing AND lasting > 4 weeks)     Scheduled 5/2/24.    Answer Assessment - Initial Assessment Questions  1. LOCATION: \"Where does it hurt?\"       Throat   2. RADIATION: \"Does the pain shoot anywhere else?\" (e.g., chest, back)      Back   3. ONSET: \"When did the pain begin?\" (e.g., minutes, hours or days ago)       Ongoing issue, all day today  4. SUDDEN: \"Gradual or sudden onset?\"      gradual  5. PATTERN \"Does the pain come and go, or is it constant?\"     - If constant: \"Is it getting better, staying " "the same, or worsening?\"       (Note: Constant means the pain never goes away completely; most serious pain is constant and it progresses)      - If intermittent: \"How long does it last?\" \"Do you have pain now?\"      (Note: Intermittent means the pain goes away completely between bouts)      constant  6. SEVERITY: \"How bad is the pain?\"  (e.g., Scale 1-10; mild, moderate, or severe)     - MODERATE (4-7): interferes with normal activities or awakens from sleep, tender to touch       4/10  7. RECURRENT SYMPTOM: \"Have you ever had this type of stomach pain before?\" If Yes, ask: \"When was the last time?\" and \"What happened that time?\"       Ongoing   8. AGGRAVATING FACTORS: \"Does anything seem to cause this pain?\" (e.g., foods, stress, alcohol)      food  9. CARDIAC SYMPTOMS: \"Do you have any of the following symptoms: chest pain, difficulty breathing, sweating, nausea?\"      Chest pain, nausea, vomiting  10. OTHER SYMPTOMS: \"Do you have any other symptoms?\" (e.g., fever, vomiting, diarrhea)        Loose stools    Protocols used: Abdominal Pain - Upper-ADULT-[place]    "

## 2024-04-16 ENCOUNTER — HOSPITAL ENCOUNTER (OUTPATIENT)
Dept: INFUSION CENTER | Facility: CLINIC | Age: 29
Discharge: HOME | End: 2024-04-16
Payer: COMMERCIAL

## 2024-04-16 ENCOUNTER — APPOINTMENT (OUTPATIENT)
Dept: LAB | Age: 29
End: 2024-04-16
Payer: COMMERCIAL

## 2024-04-16 VITALS
WEIGHT: 124 LBS | HEART RATE: 84 BPM | BODY MASS INDEX: 23.43 KG/M2 | RESPIRATION RATE: 18 BRPM | OXYGEN SATURATION: 99 % | DIASTOLIC BLOOD PRESSURE: 80 MMHG | TEMPERATURE: 97.8 F | SYSTOLIC BLOOD PRESSURE: 113 MMHG

## 2024-04-16 DIAGNOSIS — R10.13 EPIGASTRIC PAIN: ICD-10-CM

## 2024-04-16 DIAGNOSIS — M32.8 OTHER FORMS OF SYSTEMIC LUPUS ERYTHEMATOSUS, UNSPECIFIED ORGAN INVOLVEMENT STATUS (HCC): Primary | ICD-10-CM

## 2024-04-16 PROCEDURE — 96413 CHEMO IV INFUSION 1 HR: CPT

## 2024-04-16 PROCEDURE — 87338 HPYLORI STOOL AG IA: CPT

## 2024-04-16 RX ORDER — ACETAMINOPHEN 325 MG/1
650 TABLET ORAL ONCE
OUTPATIENT
Start: 2024-05-14

## 2024-04-16 RX ORDER — DIPHENHYDRAMINE HCL 25 MG
25 TABLET ORAL ONCE
OUTPATIENT
Start: 2024-05-14

## 2024-04-16 RX ORDER — SODIUM CHLORIDE 9 MG/ML
20 INJECTION, SOLUTION INTRAVENOUS ONCE
Status: COMPLETED | OUTPATIENT
Start: 2024-04-16 | End: 2024-04-16

## 2024-04-16 RX ORDER — ACETAMINOPHEN 325 MG/1
650 TABLET ORAL ONCE
Status: COMPLETED | OUTPATIENT
Start: 2024-04-16 | End: 2024-04-16

## 2024-04-16 RX ORDER — SODIUM CHLORIDE 9 MG/ML
20 INJECTION, SOLUTION INTRAVENOUS ONCE
OUTPATIENT
Start: 2024-05-14

## 2024-04-16 RX ORDER — DIPHENHYDRAMINE HCL 25 MG
25 TABLET ORAL ONCE
Status: COMPLETED | OUTPATIENT
Start: 2024-04-16 | End: 2024-04-16

## 2024-04-16 RX ADMIN — SODIUM CHLORIDE 20 ML/HR: 0.9 INJECTION, SOLUTION INTRAVENOUS at 15:18

## 2024-04-16 RX ADMIN — DIPHENHYDRAMINE HYDROCHLORIDE 25 MG: 25 TABLET ORAL at 15:19

## 2024-04-16 RX ADMIN — ACETAMINOPHEN 650 MG: 325 TABLET ORAL at 15:19

## 2024-04-16 RX ADMIN — BELIMUMAB 560 MG: 120 INJECTION, POWDER, LYOPHILIZED, FOR SOLUTION INTRAVENOUS at 16:00

## 2024-04-16 NOTE — PROGRESS NOTES
Patient here for Loretta, offers no complaints. She tolerated her infusion without incident. She does not have her next appointment made yet but will call to schedule. Declined AVS.

## 2024-04-16 NOTE — LETTER
[facility]  [address]  [address]ENTER  [place] [address]  Dept: [phone]    April 16, 2024     Patient: Jenny Rodrigues   YOB: 1995   Date of Visit: 4/16/2024       To Whom it May Concern:    Jenny Rodrigues is under my professional care. She was seen in the hospital from on 04/16/24.     If you have any questions or concerns, please don't hesitate to call.         Sincerely,          Karen Aponte RN

## 2024-04-17 ENCOUNTER — TELEPHONE (OUTPATIENT)
Dept: NEUROLOGY | Facility: CLINIC | Age: 29
End: 2024-04-17

## 2024-04-17 ENCOUNTER — HOSPITAL ENCOUNTER (OUTPATIENT)
Dept: RADIOLOGY | Facility: HOSPITAL | Age: 29
Discharge: HOME | End: 2024-04-17
Payer: COMMERCIAL

## 2024-04-17 DIAGNOSIS — R11.2 NAUSEA AND VOMITING, UNSPECIFIED VOMITING TYPE: ICD-10-CM

## 2024-04-17 LAB — H PYLORI AG STL QL IA: NEGATIVE

## 2024-04-17 PROCEDURE — A9502 TC99M TETROFOSMIN: HCPCS

## 2024-04-17 PROCEDURE — 78264 GASTRIC EMPTYING IMG STUDY: CPT

## 2024-04-17 NOTE — TELEPHONE ENCOUNTER
Received VM transcription from 4/15/24, 1:26 PM:    Hi, my name is Jenny Rodrigues. Birthday is February 4, 1995. I'm a patient of Dr. Espitia and I have been experiencing for about a week now, these intermittent electrical shock sensations in my back and my feet, my arms, my legs intermittently. It was really bad yesterday, last night. That's why I'm calling because I couldn't sleep at all because of it. So if someone could call me back at [phone]. I would really appreciate it. Thank you.  -------------------------    Spoke with pt and she reports electrical shock sensation, like if you touch something and it zaps you. This sensation travels. Starts in neck or back and travels down to arm or leg. Pt states that it happens on both side of her body but not at the same time.     Pt has numbness in left leg, not new. Denies numbness/tingling in other parts of body.    Says this has been going on for about a week and a half now. Not going away but not occurring all day long either. Says is happening intermittently. It can happen with she is looking down at piece of paper, when neck is angled weirdly.     No new medications.     However, pt reports new health issues concerning her stomach. Happening for about a month and a half now. Pt has not been able to hold down food, not eating well. Today is getting gastric emptying study. Has a little trouble swallowing/nausea/vomiting. Seeing GI for this.    Pt says the night she had difficulty sleeping, there was no position that helped. Had to get up and move around. Pt says in order to fall asleep at night she tries to be active walking around until she gets physically tired enough to fall asleep.    Advised pt to go to ED if worsening symptoms. In meantime, will inform provider of symptoms. Pt verbalizes understanding. Pt not currently in distress.    Dr. VO - Please advise.    Best cb [phone], ok to leave detailed message.

## 2024-04-17 NOTE — TELEPHONE ENCOUNTER
Spoke with pt and scheduled appointment for Friday 4/19/2024 at 10:30 AM at the [place] location.       ADD ON

## 2024-04-19 ENCOUNTER — OFFICE VISIT (OUTPATIENT)
Dept: NEUROLOGY | Facility: CLINIC | Age: 29
End: 2024-04-19
Payer: COMMERCIAL

## 2024-04-19 ENCOUNTER — APPOINTMENT (OUTPATIENT)
Dept: LAB | Facility: MEDICAL CENTER | Age: 29
End: 2024-04-19
Payer: COMMERCIAL

## 2024-04-19 VITALS
HEART RATE: 87 BPM | SYSTOLIC BLOOD PRESSURE: 110 MMHG | HEIGHT: 61 IN | TEMPERATURE: 97.5 F | OXYGEN SATURATION: 93 % | WEIGHT: 128 LBS | DIASTOLIC BLOOD PRESSURE: 80 MMHG | BODY MASS INDEX: 24.17 KG/M2

## 2024-04-19 DIAGNOSIS — M32.9 LUPUS (HCC): ICD-10-CM

## 2024-04-19 DIAGNOSIS — R25.1 TREMOR: ICD-10-CM

## 2024-04-19 DIAGNOSIS — G43.809 VESTIBULAR MIGRAINE: ICD-10-CM

## 2024-04-19 DIAGNOSIS — R20.2 PARESTHESIA: ICD-10-CM

## 2024-04-19 DIAGNOSIS — R29.818 LHERMITTE'S SIGN POSITIVE: ICD-10-CM

## 2024-04-19 DIAGNOSIS — R13.12 OROPHARYNGEAL DYSPHAGIA: ICD-10-CM

## 2024-04-19 DIAGNOSIS — R53.1 WEAKNESS GENERALIZED: ICD-10-CM

## 2024-04-19 DIAGNOSIS — R29.818 LHERMITTE'S SIGN POSITIVE: Primary | ICD-10-CM

## 2024-04-19 LAB
CRP SERPL QL: 5.9 MG/L
ERYTHROCYTE [SEDIMENTATION RATE] IN BLOOD: 9 MM/HOUR (ref 0–19)
VIT B12 SERPL-MCNC: 366 PG/ML (ref 180–914)

## 2024-04-19 PROCEDURE — 85652 RBC SED RATE AUTOMATED: CPT

## 2024-04-19 PROCEDURE — 82607 VITAMIN B-12: CPT

## 2024-04-19 PROCEDURE — 86140 C-REACTIVE PROTEIN: CPT

## 2024-04-19 PROCEDURE — 36415 COLL VENOUS BLD VENIPUNCTURE: CPT

## 2024-04-19 PROCEDURE — 99215 OFFICE O/P EST HI 40 MIN: CPT | Performed by: PSYCHIATRY & NEUROLOGY

## 2024-04-19 NOTE — PROGRESS NOTES
Patient ID: Jenny Rodrigues is a 29 y.o. female.    Assessment/Plan:           Problem List Items Addressed This Visit          Cardiovascular and Mediastinum    Vestibular migraine       Respiratory    Oropharyngeal dysphagia    Relevant Orders    Ambulatory Referral to Speech Therapy       Rheumatology    Lupus (HCC)    Relevant Orders    MRI cervical spine with and without contrast    Sedimentation rate, automated (Completed)    C-reactive protein (Completed)       Neurology/Sleep    Weakness generalized    Paresthesia    Relevant Orders    MRI cervical spine with and without contrast    Sedimentation rate, automated (Completed)    C-reactive protein (Completed)    Vitamin B12 (Completed)    Tremor    Relevant Orders    Vitamin B12 (Completed)       Other    Lhermitte's sign positive - Primary    Relevant Orders    MRI cervical spine with and without contrast    Sedimentation rate, automated (Completed)    C-reactive protein (Completed)      Mrs. Rodrigues has presented to [facility] multiple sclerosis center for follow-up on lupus and multiple neurological complaints.    Patient described developing upper respiratory infection with pneumonia due to viral infection requiring months longer treatment with antibacterial regimen as a result.  During this process patient was offered to discontinue methotrexate and any other means of immunosuppressive regimens provided for lupus.  Patient is restarting her Belimumab second infusion coming shortly.    Patient describes having diffuse paresthesia which comes as shocklike is up like sensation involving upper lower extremities as well as the spine.  Patient stating she has electricity comes along her spine and she is bending her neck down which brought concern for Lhermitte sign.     MRI cervical spine with without contrast will be advised considering patient has autoimmune condition and she may develop flareup due to underlying infection.  We advised against steroid  regimen due to type 1 diabetes which has been more stable over the last 5 years.    Patient will be advised several basic serologic workup.  Patient will be offered B12 level checked despite she is taking sublingual supplements as her tremors slightly more pronounced in the setting of recovering from infection.    No new focal weakness been advised, no signs of double vision or vision loss or facial symmetry noted.  Patient stated she has mild dysphagia with evaluation by speech and swallow team will be advised.    Patient is to follow-up with [facility] neurology within 2-3 months.    I have spent a total time of 40 minutes on 04/21/24 in caring for this patient including Diagnostic results, Prognosis, Impressions, Counseling / Coordination of care, Documenting in the medical record, Reviewing / ordering tests, medicine, procedures  , and Obtaining or reviewing history  .     Subjective: Diffuse sensory dysfunction upper or lower extremity    HPI  Mrs. Rodrigues has presented to [facility] multiple sclerosis center for follow-up on multiple neurological complaints.    Today's complaints are: having electrical shock feeling down her neck, back, legs, feet, arms, and hands. Tremor in right hand is worse and below her right eye has been twitching and swelling on an off.  Patient was on her honeymoon where she had suffered pneumonia was otitis externa infection requiring emergent department evaluation in February 2024.  Patient attended [employer] with multiple kids around.  Patient required antibacterial regimen and discontinuation of methotrexate and her infusions.    Consequently patient developed shocklike sensation along her spine on bending her neck down.  Patient trying to do more exercise to get stronger but she has she has improvement in sensory dysfunction from the left side only.  Really does not help with sensation in her legs.  Patient described having ripping sensation in multiple parts of her  body.    Patient also had difficulties keeping her food down with nausea and vomiting requiring gastric emptying study which she passed.  Patient also describes dysphagia since last week and she feels she cannot swallow foods.  Patient is less and less.  We discussed patient serological workup including to decrease phosphorus.    Serologic workup for flu/RSV/COVID/strep came back negative.    Patient had completed MRI brain and MRA head in October 2023 for acutely developed left-sided sensorimotor dysfunction with normal MRI being described at that time, patient continued describing residual left lower extremity sensory loss in the setting of known history of undifferentiated connective tissue disorder while using methotrexate and Plaquenil and recently belimumab.   As we agreed with the patient we had completed imaging of the brain cervical and thoracic spine in 2021 were evaluation was required to rule out CNS demyelination with concern for entire rheumatological manifestation of any other autoimmune condition with central nervous system involvement.  Findings were unremarkable.     Patient completed EMG/NCS study in December 2020 with normal results been done as majority of rheumatological condition present with small fiber neuropathy and normal EMGs.     No focal neurological deficit noted on today's exam, patient will not require any additional starting today.     Patient will be considering formal neuropsychologic evaluation when she is feeling better.    The following portions of the patient's history were reviewed and updated as appropriate: She  has a past medical history of Abdominal pain, Anaphylaxis, Anxiety, Anxiety and depression, COVID-19 (12/2020), Delayed emergence from anesthesia (03/23/2023), Delirium, induced by drug, Depression, Diabetes mellitus (HCC), Disease of thyroid gland, DKA, type 1 (HCC) (05/11/2021), Eating disorder, Food intolerance, Fracture of fibula, Gilbert disease, Hashimoto's  disease (02/21/2020), Head injury, Headache(784.0), Nasal congestion, PTSD (post-traumatic stress disorder), Rectal bleed, Seizures ([facility]), and Type 1 diabetes ([facility]).  She   Patient Active Problem List    Diagnosis Date Noted    Lhermitte's sign positive 04/19/2024    Paresthesia 04/19/2024    Tremor 04/19/2024    Oropharyngeal dysphagia 04/19/2024    Bronchitis 02/14/2024    Vestibular migraine 01/28/2024    Cognitive attention deficit 01/28/2024    Left hemiparesis ([facility]) 01/26/2024    Lupus ([facility]) 01/15/2024    Pre-conception counseling 10/06/2023    Other forms of systemic lupus erythematosus ([facility]) 09/15/2023    Idiopathic hypotension 09/07/2023    Pigmentation abnormality of skin 06/20/2023    Hearing loss of right ear 06/08/2023    Undifferentiated connective tissue disease ([facility]) 06/08/2023    Delayed emergence from anesthesia 03/23/2023    Controlled diabetes mellitus type 1 with complications ([facility]) 03/22/2023    Bruising 10/19/2022    Constipation 09/28/2022    Gastroesophageal reflux disease 09/26/2022    Other chest pain 02/22/2022    Physical deconditioning 12/21/2021    Right-sided back pain 12/21/2021    Verruca 09/23/2021    Nail abnormality 09/01/2021    Primary hypothyroidism 08/18/2021    Type 1 diabetes mellitus ([facility]) 07/17/2021    Type 1 diabetes mellitus with hyperglycemia ([facility]) 07/17/2021    Uncontrolled type 1 diabetes mellitus with hyperglycemia ([facility]) 07/17/2021    Elevated prolactin level 07/14/2021    Fatty stools 06/29/2021    Left low back pain 06/29/2021    Hyperlipidemia 05/19/2021    Visual hallucinations 05/11/2021    Rheumatoid factor positive 04/29/2021    History of anesthesia complications 03/22/2021    Seizures ([facility])     Positive SOHAM (antinuclear antibody) 03/09/2021    Weakness generalized 02/11/2021    Low serum vitamin B12 02/11/2021    Arthralgia 02/11/2021    Bipolar affective disorder ([facility]) 01/13/2021    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    Yeast infection 12/01/2020    Syncope     Chronic  back pain 10/16/2020    Insulin pump status 09/30/2020    Muscle weakness 09/03/2020    Word finding difficulty 09/03/2020    Neuropathy 06/23/2020    Polyarthritis 06/23/2020    Secondary amenorrhea 06/23/2020    Vitamin D deficiency 06/19/2020    Paresthesia of left leg 04/21/2020    Hashimoto's thyroiditis 02/21/2020    Dysuria 02/21/2020    Visual changes 01/21/2020    Chronic pain of right knee 10/15/2019    Chronic abdominal pain 10/15/2019    Blood in the stool 10/15/2019    OCD (obsessive compulsive disorder) 09/26/2019    Activity intolerance 09/10/2019    Chronic post-traumatic stress disorder (PTSD) 08/12/2019    Generalized anxiety disorder with panic attacks 08/12/2019    Nausea and vomiting 08/06/2019    Agitation 08/06/2019    Concussion without loss of consciousness 07/02/2019    Urinary frequency 06/06/2019    Abnormal stools 02/07/2019    Uncontrolled type 1 diabetes mellitus with hypoglycemia without coma (HCC) 01/29/2019    Abnormal liver function test 12/13/2018    Right sided abdominal pain 12/13/2018    Chronic fatigue and malaise 12/13/2018    Patellofemoral pain syndrome of right knee 09/26/2018     She  has a past surgical history that includes Wrist surgery; [place] tooth extraction; Nasal septoplasty w/ turbinoplasty; Knee surgery (Right, 07/06/2020); Sinus surgery; Turbinoplasty (N/A, 03/22/2021); Colonoscopy; Upper gastrointestinal endoscopy; Skin biopsy; and EGD (03/23/2023).  Her family history includes Alcohol abuse in her brother and father; Anxiety disorder in her mother; Arthritis in her mother; Cancer in her family; Cholelithiasis in her father; Cirrhosis in her father; Coronary artery disease in her father; Depression in her mother; Diabetes in her family, father, and mother; Diabetes type II in her mother; Eczema in her father; Hashimoto's thyroiditis in her sister; Hearing loss in her mother; Hyperlipidemia in her father and mother; Hypertension in her family, father, and  "mother; Migraines in her mother; Nephrolithiasis in her father; Sarcoidosis in her father; Thyroid disease in her sister; Varicose Veins in her mother.  She  reports that she has never smoked. She has never been exposed to tobacco smoke. She has never used smokeless tobacco. She reports current alcohol use. She reports that she does not use drugs.  Current Outpatient Medications   Medication Sig Dispense Refill    BD Insulin Syringe U/F 31G X 5/16\" 0.5 ML MISC Use as directly with weekly methotrexate injection. 100 each 0    Belimumab (BENLYSTA IV) Inject into a catheter in a vein every month to absorb continually Switching to monthly on 12/5      clindamycin (CLEOCIN T) 1 % lotion Apply 1 application. topically 2 (two) times a day      Cyanocobalamin 1000 MCG SUBL Place 1 tablet (1,000 mcg total) under the tongue daily 90 tablet 0    folic acid (FOLVITE) 1 mg tablet Take 1 tablet (1 mg total) by mouth daily 90 tablet 3    gabapentin (Neurontin) 600 MG tablet Take 1 tablet (600 mg total) by mouth daily 90 tablet 1    Glucagon (Gvoke HypoPen 2-Pack) 1 MG/0.2ML SOAJ 1 mg PRN for severe hypoglycemia 0.4 mL 0    hydroxychloroquine (PLAQUENIL) 200 mg tablet Take 1 tablet (200 mg total) by mouth 2 (two) times a day with meals 180 tablet 3    Insulin Disposable Pump (Omnipod 5 G6 Pod, Gen 5,) MISC       Insulin Pen Needle (BD PEN NEEDLE NASIM U/F) 32G X 4 MM MISC by Does not apply route 4 (four) times a day for 180 days 400 each 3    levonorgestrel-ethinyl estradiol (NORDETTE) 0.15-30 MG-MCG per tablet Take 1 tablet by mouth daily 84 tablet 1    levothyroxine 25 mcg tablet Take 1 tablet (25 mcg total) by mouth daily 60 tablet 0    LORazepam (Ativan) 0.5 mg tablet Take 1 tablet (0.5 mg total) by mouth 2 (two) times a day as needed for anxiety 60 tablet 0    metFORMIN (GLUCOPHAGE-XR) 500 mg 24 hr tablet TAKE 1 TABLET BY MOUTH EVERY DAY WITH DINNER 90 tablet 1    methotrexate 50 MG/2ML injection Inject 0.8 mL (20 mg total) " under the skin once a week 8 mL 4    NovoLOG 100 UNIT/ML injection Up to 100 units per day with insulin pump 90 mL 3    OneTouch Verio test strip USE 4 TIMES A DAY BEFORE MEALS AND AT BEDTIME      sodium chloride 1 g tablet Take 1 tablet (1 g total) by mouth if needed (for dizziness an lightheadendess in setting of low BP) 30 tablet 0    Tresiba FlexTouch 100 units/mL injection pen Inject 30 Units under the skin daily 15 mL 0    benzonatate (TESSALON PERLES) 100 mg capsule Take 1 capsule (100 mg total) by mouth 3 (three) times a day as needed for cough (Patient not taking: Reported on 3/13/2024) 20 capsule 0    ciprofloxacin-dexamethasone (CIPRODEX) otic suspension Administer 4 drops into the left ear 2 (two) times a day (Patient not taking: Reported on 3/13/2024) 7.5 mL 0    famotidine (Pepcid) 20 mg tablet Take 20 mg by mouth 2 (two) times a day (Patient not taking: Reported on 4/19/2024)      Fluticasone-Salmeterol (Advair) 250-50 mcg/dose inhaler Inhale 1 puff 2 (two) times a day Rinse mouth after use. (Patient not taking: Reported on 3/13/2024) 60 blister 0    glucagon 1 MG injection 1 mg (Patient not taking: Reported on 3/13/2024)      Glucagon HCl (Glucagon Emergency) 1 MG/ML SOLR INJECT 1 MG AS DIRECTED AS NEEDED (WHEN PASSED OUT FROM LOW BLOOD SUGAR). (Patient not taking: Reported on 2/14/2024)      guaifenesin-codeine (GUAIFENESIN AC) 100-10 MG/5ML liquid Take 5 mL by mouth 3 (three) times a day as needed for cough (Patient not taking: Reported on 3/13/2024) 150 mL 0    insulin lispro (HumaLOG) 100 units/mL injection Inject 15 Units under the skin 3 (three) times a day before meals (Patient not taking: Reported on 2/14/2024) 10 mL 0    miconazole 2 % cream Apply 1 Application topically 2 (two) times a day To affected area (Patient not taking: Reported on 3/13/2024)       No current facility-administered medications for this visit.     Current Outpatient Medications on File Prior to Visit   Medication Sig  "   BD Insulin Syringe U/F 31G X 5/16\" 0.5 ML MISC Use as directly with weekly methotrexate injection.    Belimumab (BENLYSTA IV) Inject into a catheter in a vein every month to absorb continually Switching to monthly on 12/5    clindamycin (CLEOCIN T) 1 % lotion Apply 1 application. topically 2 (two) times a day    Cyanocobalamin 1000 MCG SUBL Place 1 tablet (1,000 mcg total) under the tongue daily    folic acid (FOLVITE) 1 mg tablet Take 1 tablet (1 mg total) by mouth daily    gabapentin (Neurontin) 600 MG tablet Take 1 tablet (600 mg total) by mouth daily    Glucagon (Gvoke HypoPen 2-Pack) 1 MG/0.2ML SOAJ 1 mg PRN for severe hypoglycemia    hydroxychloroquine (PLAQUENIL) 200 mg tablet Take 1 tablet (200 mg total) by mouth 2 (two) times a day with meals    Insulin Disposable Pump (Omnipod 5 G6 Pod, Gen 5,) MISC     Insulin Pen Needle (BD PEN NEEDLE NASIM U/F) 32G X 4 MM MISC by Does not apply route 4 (four) times a day for 180 days    levonorgestrel-ethinyl estradiol (NORDETTE) 0.15-30 MG-MCG per tablet Take 1 tablet by mouth daily    levothyroxine 25 mcg tablet Take 1 tablet (25 mcg total) by mouth daily    LORazepam (Ativan) 0.5 mg tablet Take 1 tablet (0.5 mg total) by mouth 2 (two) times a day as needed for anxiety    metFORMIN (GLUCOPHAGE-XR) 500 mg 24 hr tablet TAKE 1 TABLET BY MOUTH EVERY DAY WITH DINNER    methotrexate 50 MG/2ML injection Inject 0.8 mL (20 mg total) under the skin once a week    NovoLOG 100 UNIT/ML injection Up to 100 units per day with insulin pump    OneTouch Verio test strip USE 4 TIMES A DAY BEFORE MEALS AND AT BEDTIME    sodium chloride 1 g tablet Take 1 tablet (1 g total) by mouth if needed (for dizziness an lightheadendess in setting of low BP)    Tresiba FlexTouch 100 units/mL injection pen Inject 30 Units under the skin daily    benzonatate (TESSALON PERLES) 100 mg capsule Take 1 capsule (100 mg total) by mouth 3 (three) times a day as needed for cough (Patient not taking: Reported " "on 3/13/2024)    ciprofloxacin-dexamethasone (CIPRODEX) otic suspension Administer 4 drops into the left ear 2 (two) times a day (Patient not taking: Reported on 3/13/2024)    famotidine (Pepcid) 20 mg tablet Take 20 mg by mouth 2 (two) times a day (Patient not taking: Reported on 4/19/2024)    Fluticasone-Salmeterol (Advair) 250-50 mcg/dose inhaler Inhale 1 puff 2 (two) times a day Rinse mouth after use. (Patient not taking: Reported on 3/13/2024)    glucagon 1 MG injection 1 mg (Patient not taking: Reported on 3/13/2024)    Glucagon HCl (Glucagon Emergency) 1 MG/ML SOLR INJECT 1 MG AS DIRECTED AS NEEDED (WHEN PASSED OUT FROM LOW BLOOD SUGAR). (Patient not taking: Reported on 2/14/2024)    guaifenesin-codeine (GUAIFENESIN AC) 100-10 MG/5ML liquid Take 5 mL by mouth 3 (three) times a day as needed for cough (Patient not taking: Reported on 3/13/2024)    insulin lispro (HumaLOG) 100 units/mL injection Inject 15 Units under the skin 3 (three) times a day before meals (Patient not taking: Reported on 2/14/2024)    miconazole 2 % cream Apply 1 Application topically 2 (two) times a day To affected area (Patient not taking: Reported on 3/13/2024)     No current facility-administered medications on file prior to visit.     She is allergic to insulin glargine..         Objective:    Blood pressure 110/80, pulse 87, temperature 97.5 °F (36.4 °C), temperature source Temporal, height 5' 1\" (1.549 m), weight 58.1 kg (128 lb), SpO2 93%, not currently breastfeeding.    Physical Exam    Neurological Exam  CONSTITUTIONAL: NAD, pleasant. NECK: supple, no lymphadenopathy, no thyromegaly, no JVD. CARDIOVASCULAR: RRR, normal S1S2, no murmurs, no rubs. RESP: clear to auscultation bilaterally, no wheezes/rhonchi/rales. ABDOMEN: soft, non tender, non distended. SKIN: no rash or skin lesions. EXTREMITIES: no edema, pulses 2+bilaterally. PSYCH: appropriate mood and affect  NEUROLOGIC COMPREHENSIVE EXAM: Patient is oriented to person, place " and time, NAD; appropriate affect. CN II, III, IV, V, VI, VII,VIII,IX,X,XI-XII intact with EOMI, PERRLA, OKN intact, VF grossly intact, fundi poorly visualized secondary to pupillary constriction; symmetric face noted. Motor: 5/5 UE/LE bilateral symmetric; Sensory: intact to light touch and pinprick bilaterally; normal vibration sensation feet bilaterally; Coordination within normal limits on FTN and [place] testing; DTR: 2/4 through, no Babinski, no clonus. Tandem gait is intact. Romberg: absent.    ROS:    Review of Systems   Constitutional: Negative.    HENT: Negative.     Eyes: Negative.    Respiratory: Negative.     Cardiovascular: Negative.    Gastrointestinal: Negative.    Endocrine: Negative.    Genitourinary: Negative.    Musculoskeletal: Negative.    Skin: Negative.    Allergic/Immunologic: Negative.    Neurological:  Positive for tremors (right hand worst), facial asymmetry (below right eye twitching and swelling) and numbness (electrical shocks down neck, back, legs, hand and feet).   Hematological: Negative.    Psychiatric/Behavioral: Negative.

## 2024-04-26 ENCOUNTER — EVALUATION (OUTPATIENT)
Dept: SPEECH THERAPY | Facility: CLINIC | Age: 29
End: 2024-04-26
Payer: COMMERCIAL

## 2024-04-26 ENCOUNTER — OFFICE VISIT (OUTPATIENT)
Dept: PODIATRY | Facility: CLINIC | Age: 29
End: 2024-04-26
Payer: COMMERCIAL

## 2024-04-26 VITALS
HEART RATE: 96 BPM | HEIGHT: 61 IN | WEIGHT: 126 LBS | BODY MASS INDEX: 23.79 KG/M2 | SYSTOLIC BLOOD PRESSURE: 114 MMHG | RESPIRATION RATE: 18 BRPM | DIASTOLIC BLOOD PRESSURE: 83 MMHG

## 2024-04-26 DIAGNOSIS — M32.9 LUPUS (HCC): ICD-10-CM

## 2024-04-26 DIAGNOSIS — R13.12 OROPHARYNGEAL DYSPHAGIA: Primary | ICD-10-CM

## 2024-04-26 DIAGNOSIS — B07.0 PLANTAR WART: ICD-10-CM

## 2024-04-26 DIAGNOSIS — E10.649 UNCONTROLLED TYPE 1 DIABETES MELLITUS WITH HYPOGLYCEMIA WITHOUT COMA (HCC): Primary | ICD-10-CM

## 2024-04-26 PROCEDURE — 17110 DESTRUCTION B9 LES UP TO 14: CPT | Performed by: PODIATRIST

## 2024-04-26 PROCEDURE — 92610 EVALUATE SWALLOWING FUNCTION: CPT | Performed by: SPEECH-LANGUAGE PATHOLOGIST

## 2024-04-26 PROCEDURE — 99243 OFF/OP CNSLTJ NEW/EST LOW 30: CPT | Performed by: PODIATRIST

## 2024-04-26 NOTE — PROGRESS NOTES
Speech-Language Pathology Initial Evaluation    Today's date: 2024   Patient’s name: Jenny Rodrigues  : 1995  MRN: [id number]  Safety measures: Complex medical history  Referring provider: Alma Espitia, *    Encounter Diagnosis     ICD-10-CM    1. Oropharyngeal dysphagia  R13.12 Ambulatory Referral to Speech Therapy      2. Lupus (HCC)  M32.9           Assessment:  Patient presents with a swallow function within normal limits, as she tolerated all consistencies presented today with no signs or symptoms of aspiration. However, she did report noting food that felt like it was stuck or would come back up. She was cued to alternate bites and sips and take a double swallow. She noted that upon the second sip and swallow that it felt like all food was cleared from her throat. Suspect that this is either GI related (e.g. esophageal abnormality) or is related to cervical spine issues. Also noted general mild tongue weakness upon oral mech exam. Recommend a video swallow study and further follow-up with GI. Will determined continued ST services following results of video swallow study. Plan of care was discussed with the patient and she is in agreement at this time.     -Factors affecting performance: None    -Safety concerns: No limitations    -Risk factors: Complex medical history      SWALLOWING SAFETY PRECAUTIONS:  -Recommended solids: Regular    -Recommended liquids: Thin    -Recommended medication form: As tolerated    -Frequent/thorough oral care     -Aspiration precautions   *Monitor for signs/symptoms concerning for aspiration (e.g., low grade fever, increase in WBC, change in chest x-ray, increased congestion, increased coughing with P.O. intake)    -Reflux precautions     -Strategies: Small sips and bites when eating, Slow rate, swallow between bites, and Alternate liquids and solids      -Positioning: Upright position during meals and Upright position at least 30 minutes after P.O.  intake    -Compensatory strategies: Effortful swallow and Multiple swallows    -Supervision: Independent     -Referrals: Videofluroscopic Swallow Study and Gastroenterology      Short-term goals:  To Be Determined Following Swallow Study Report    Long-term goals:  To Be Determined Following Swallow Study Report      Plan  Patient would benefit from outpatient skilled Speech Therapy services: TBD    Frequency: As needed  Duration:  TBD    Intervention certification from: 4/26/24  Intervention certification to: 5/12/24      Subjective  Starting back in March she started dealing with GI issues (nausea, vomiting, stomach pain, pain chest, feeling of something stuck in the chest). Three weeks ago she noticed that when she is eating some foods it is kind of difficult. These types of foods include rice, bagels, chicken sausage. Most foods are difficult. Drinks she hasn't noticed a problem with. When she has a sore throat it bothers her to swallow. She is not coughing or choking on anything. She was on Pepcid for aweek and she saw no improvement so she stopped as she was about to take an H-Pylori test. Test results were negative.    When she bends her head forward, on occasion she is dealing with shock like symptoms.    She has seen GI. They haven't administered an EGD due to history of difficulty with delirium due to waking up from anesthesia.     History of present illness: Patient is a 29 y.o. female who was referred to outpatient skilled Speech Therapy services for a dysphagia evaluation.     Patient has a prior medical history of the following: lupus, vestibular migraine, diabetes (type 1), Hashimoto's thyroiditis, neuropathy, PTSD, generalized anxiety disorder with panic attacks, bipolar affect disorder, seizers, Lhermitte's sign positive, and GERD (see history for full list).    4/19/24 - Patient was referred by Dr. Espitia (Neurology) due to recent complaints of mild dysphagia. She has been experiencing  "\"Shocklike\" sensations involving upper and lower extremities and spine when bending from the neck down.     Patient's goal(s): The swallowing bothers her. She feels she has enough going on and doesn't want this to be an additional problem. She would would like to get to the bottom of this.     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      Objective (testing)    DYSPHAGIA EVALUATION:    -Reason for referral: Signs/symptoms of dysphagia    -Subjective report of swallowing difficulty: Globus sensation     -Eating Assessment Tool (EAT-10) Swallowing Screening Tool is a patient self-assessment tool that rates the percieved impairment a swallowing disorder has on the Functional, Physical, and Emotional aspects of one's life.  EAT-10 score: 7/40    -Difficulty swallowing: Solids    She doesn't have an appetite so some days she barely eats.     -Current diet (solids): Regular  -Current diet (liquids): Thin  -Current pill intake method: She has always had difficulty swallowing. She has to do them one by one due to the amount she takes. She uses water. She puts water in then the pill, then more water. It will sometimes get stuck. This has been more difficult over the past year. She only learned to swallow pills a few years ago.   -Alternative Feeding Method?: No    -Facial appearance Symmetrical   -Mandible function Adequate ROM   -Dentition Adequate   -Labial function WFL   -Lingual function Decreased strength (bilateral) and Decreased protrusion/retraction   -Velar function Symmetrical   -Oral apraxia? Absent   -Vocal quality Clear/adequate   -Volitional cough Strong/productive   -Respiration WFL   -Drooling? No   -Tremor/involuntary movement? Not present     LIQUID CONSISTENCY TESTING:   Item(s) tested: (Thin) - water    Administered by: Cup and " Self-fed    Clinical findings:  -Oral phase impairments: N/A, patient WFL  -Pharyngeal phase impairments: N/A, patient WFL    Other notes: N/A    Strategies, attempts, and responses: N/A      SOLID CONSISTENCY TESTING:  Item(s) tested: (Regular, Mechanical Soft, Mixed, and Puree) - peanut butter crackers, soft fruit bar, diced peaches in thin liquid juice, and applesauce    Administered by: Spoon and Self-fed    Clinical findings:  -Oral phase impairments: N/A, patient WFL  -Pharyngeal phase impairments: N/A, patient WFL    Other notes: Other: Patient reported feeling like food was stuck and/or came back up on mechanical soft and regular consistencies.    Strategies, attempts, and responses: multiple swallow and effortful swallow          Visit tracking:    Re-eval Due POC Expires Auth Expiration Date ST Visit Limit   7/12/24 7/12/24 12/31/24 BOMN                           Visit/Unit Tracking  AUTH Status:  Date 4/26/24   Not Required Used 1    Remaining  BOMN       Intervention comments:  40 minutes of swallow evaluation time     No

## 2024-04-26 NOTE — PROGRESS NOTES
"   PATIENT:  Jenny Rodrigues    1995    ASSESSMENT:     1. Uncontrolled type 1 diabetes mellitus with hypoglycemia without coma (HCC)  Ambulatory referral to Podiatry      2. Plantar wart  Lesion Destruction            PLAN:  1.  Reviewed medical records.  Reviewed the note from PCP.  Patient was counseled on the condition and diagnosis.    2.  Educated disease prevention and risks related to diabetes.    3.  Educated proper daily foot care and exam.  Instructed proper skin care / protection and footwear.  Instructed to identify any signs of infection and related foot problem.    4.  The recent blood work was reviewed / discussed and the last HbA1c was 9.6.  Discussed proper blood glucose control with diet and exercise.    5. Skin lesions on her left foot are most consistent with plantar wart.  Discussed options and the patient wished to proceed with chemical cauterization.  Verbal consent was obtained from the patient. All the verrucoid lesion(s) and any surround hyperkeratotic skin lesions were debrided to a level of pinpoint bleeding using a sterile #15 scapel.  The lesions were then cauterized with Cantharidin.  An occlusive dressing was applied to the areas.  Instructed to remove the dressing in the evening. Instruction was given for possible local care.  The patient tolerated the procedure well and without complications.   6. The patient will return in 3 weeks.      Imaging: I have personally reviewed pertinent films in PACS  Labs, pathology, and Other Studies: I have personally reviewed pertinent reports.      Lesion Destruction    Date/Time: 4/26/2024 9:00 AM    Performed by: Andrew Mariee DPM  Authorized by: Andrew Mariee DPM  Universal Protocol:  Consent: Verbal consent obtained.  Risks and benefits: risks, benefits and alternatives were discussed  Consent given by: patient  Time out: Immediately prior to procedure a \"time out\" was called to verify the correct patient, procedure, equipment, support staff and " site/side marked as required.  Timeout called at: 4/26/2024 9:39 PM.  Patient understanding: patient states understanding of the procedure being performed  Patient identity confirmed: verbally with patient    Procedure Details - Lesion Destruction:     Number of Lesions:  2  Lesion 1:     Body area:  Lower extremity    Lower extremity location:  L big toe    Malignancy: benign lesion      Destruction method: chemical removal    Lesion 2:     Body area:  Lower extremity    Lower extremity location:  L foot    Malignancy: benign lesion      Destruction method: chemical removal          Subjective:          HPI  The patient was referred to my office for diabetic foot evaluation and evaluation of painful lesions on left foot.  The patient has diabetes since 2019.  Her BS has been high since she was sick.  No history of diabetic foot ulcer or related foot infection.  She has mild numbness and tingling in her feet.  Denied weakness or significant functional deficit.  She complained of sharp pain in her left foot.  She had them for a couple of years without active treatment.      The following portions of the patient's history were reviewed and updated as appropriate: allergies, current medications, past family history, past medical history, past social history, past surgical history and problem list.  All pertinent labs and images were reviewed.    Past Medical History  Past Medical History:   Diagnosis Date    Abdominal pain     Anaphylaxis     Anxiety     Anxiety and depression     COVID-19 12/2020    Delayed emergence from anesthesia 03/23/2023    Severe episode of emergence delirium (confused, combative) after MAC anesthetic on 03/2023 for EGD. Received versed 2mg, >50mcg precedex, 5mg IV haldol, and 50mcg fentanyl. Waxing and waning episodes of agitation. Any future anesthetics should NOT be done at [facility].    Delirium, induced by drug     anesthesia    Depression     Diabetes mellitus (HCC)     Disease of thyroid  "gland     Hashimoto    DKA, type 1 (HCC) 05/11/2021    Eating disorder     history of anorexia/bulemia [id number]    Food intolerance     Fracture of fibula     R Salter I    Gilbert disease     Hashimoto's disease 02/21/2020    Head injury     Headache(784.0)     Nasal congestion     PTSD (post-traumatic stress disorder)     Rectal bleed     Seizures (HCC)     Type 1 diabetes (HCC)        Past Surgical History  Past Surgical History:   Procedure Laterality Date    COLONOSCOPY      EGD  03/23/2023    KNEE SURGERY Right 07/06/2020    NASAL SEPTOPLASTY W/ TURBINOPLASTY      SINUS SURGERY      SKIN BIOPSY      TURBINOPLASTY N/A 03/22/2021    Procedure: TURBINOPLASTY;  Surgeon: Jn Hidalgo MD;  Location: [facility];  Service: ENT    UPPER GASTROINTESTINAL ENDOSCOPY      WISDOM TOOTH EXTRACTION      WRIST SURGERY      left; Excision of ganglion        Allergies:  Insulin glargine    Medications:  Current Outpatient Medications   Medication Sig Dispense Refill    BD Insulin Syringe U/F 31G X 5/16\" 0.5 ML MISC Use as directly with weekly methotrexate injection. 100 each 0    Belimumab (BENLYSTA IV) Inject into a catheter in a vein every month to absorb continually Switching to monthly on 12/5      benzonatate (TESSALON PERLES) 100 mg capsule Take 1 capsule (100 mg total) by mouth 3 (three) times a day as needed for cough (Patient not taking: Reported on 3/13/2024) 20 capsule 0    ciprofloxacin-dexamethasone (CIPRODEX) otic suspension Administer 4 drops into the left ear 2 (two) times a day (Patient not taking: Reported on 3/13/2024) 7.5 mL 0    clindamycin (CLEOCIN T) 1 % lotion Apply 1 application. topically 2 (two) times a day      Cyanocobalamin 1000 MCG SUBL Place 1 tablet (1,000 mcg total) under the tongue daily 90 tablet 0    famotidine (Pepcid) 20 mg tablet Take 20 mg by mouth 2 (two) times a day (Patient not taking: Reported on 4/19/2024)      Fluticasone-Salmeterol (Advair) 250-50 mcg/dose inhaler Inhale 1 " puff 2 (two) times a day Rinse mouth after use. (Patient not taking: Reported on 3/13/2024) 60 blister 0    folic acid (FOLVITE) 1 mg tablet Take 1 tablet (1 mg total) by mouth daily 90 tablet 3    gabapentin (Neurontin) 600 MG tablet Take 1 tablet (600 mg total) by mouth daily 90 tablet 1    Glucagon (Gvoke HypoPen 2-Pack) 1 MG/0.2ML SOAJ 1 mg PRN for severe hypoglycemia 0.4 mL 0    glucagon 1 MG injection 1 mg (Patient not taking: Reported on 3/13/2024)      Glucagon HCl (Glucagon Emergency) 1 MG/ML SOLR INJECT 1 MG AS DIRECTED AS NEEDED (WHEN PASSED OUT FROM LOW BLOOD SUGAR). (Patient not taking: Reported on 2/14/2024)      guaifenesin-codeine (GUAIFENESIN AC) 100-10 MG/5ML liquid Take 5 mL by mouth 3 (three) times a day as needed for cough (Patient not taking: Reported on 3/13/2024) 150 mL 0    hydroxychloroquine (PLAQUENIL) 200 mg tablet Take 1 tablet (200 mg total) by mouth 2 (two) times a day with meals 180 tablet 3    Insulin Disposable Pump (Omnipod 5 G6 Pod, Gen 5,) MISC       insulin lispro (HumaLOG) 100 units/mL injection Inject 15 Units under the skin 3 (three) times a day before meals (Patient not taking: Reported on 2/14/2024) 10 mL 0    Insulin Pen Needle (BD PEN NEEDLE NASIM U/F) 32G X 4 MM MISC by Does not apply route 4 (four) times a day for 180 days 400 each 3    levonorgestrel-ethinyl estradiol (NORDETTE) 0.15-30 MG-MCG per tablet Take 1 tablet by mouth daily 84 tablet 1    levothyroxine 25 mcg tablet Take 1 tablet (25 mcg total) by mouth daily 60 tablet 0    LORazepam (Ativan) 0.5 mg tablet Take 1 tablet (0.5 mg total) by mouth 2 (two) times a day as needed for anxiety 60 tablet 0    metFORMIN (GLUCOPHAGE-XR) 500 mg 24 hr tablet TAKE 1 TABLET BY MOUTH EVERY DAY WITH DINNER 90 tablet 1    methotrexate 50 MG/2ML injection Inject 0.8 mL (20 mg total) under the skin once a week 8 mL 4    miconazole 2 % cream Apply 1 Application topically 2 (two) times a day To affected area (Patient not taking:  Reported on 3/13/2024)      NovoLOG 100 UNIT/ML injection Up to 100 units per day with insulin pump 90 mL 3    OneTouch Verio test strip USE 4 TIMES A DAY BEFORE MEALS AND AT BEDTIME      sodium chloride 1 g tablet Take 1 tablet (1 g total) by mouth if needed (for dizziness an lightheadendess in setting of low BP) 30 tablet 0    Tresiba FlexTouch 100 units/mL injection pen Inject 30 Units under the skin daily 15 mL 0     No current facility-administered medications for this visit.       Social History:  Social History     Socioeconomic History    Marital status: /Civil Union     Spouse name: None    Number of children: 0    Years of education: None    Highest education level: Associate degree: academic program   Occupational History    Occupation: unemployed   Tobacco Use    Smoking status: Never     Passive exposure: Never    Smokeless tobacco: Never    Tobacco comments:     Tobacco smoke exposure (Father smokes cigars)   Vaping Use    Vaping status: Never Used   Substance and Sexual Activity    Alcohol use: Yes     Comment: rarely    Drug use: Never    Sexual activity: Yes     Partners: Male     Birth control/protection: Condom Male, OCP   Other Topics Concern    None   Social History Narrative    Student at [employer]    Reports a poor diet; low in vegetables, high in sweets    Dental care, regularly    Lives with parents    Sleeps 8-10 hours a day        Do you have pets? Dog,cat Is pet allowed in bedroom?Yes    Are you a smoker? Never    Does anyone smoke in your home? No       Do you live with smokers? Yes    [place] frequently? No   How many times a year? N/A      Social Determinants of Health     Financial Resource Strain: Low Risk  (7/8/2023)    Received from [facility]    Overall Financial Resource Strain (CARDIA)     Difficulty of Paying Living Expenses: Not hard at all   Food Insecurity: No Food Insecurity (1/16/2024)    Hunger Vital Sign     Worried About Running Out of Food in  the Last Year: Never true     Ran Out of Food in the Last Year: Never true   Transportation Needs: No Transportation Needs (1/16/2024)    PRAPARE - Transportation     Lack of Transportation (Medical): No     Lack of Transportation (Non-Medical): No   Physical Activity: Insufficiently Active (8/7/2020)    Exercise Vital Sign     Days of Exercise per Week: 7 days     Minutes of Exercise per Session: 10 min   Stress: Stress Concern Present (8/7/2020)    [place] [employer] - Occupational Stress Questionnaire     Feeling of Stress : Rather much   Social Connections: Unknown (8/7/2020)    Social Connection and Isolation Panel [NHANES]     Frequency of Communication with Friends and Family: More than three times a week     Frequency of Social Gatherings with Friends and Family: Not on file     Attends Hoahaoism Services: Not on file     Active Member of Clubs or Organizations: No     Attends Club or Organization Meetings: Never     Marital Status: Never    Intimate Partner Violence: Not At Risk (7/8/2023)    Received from [facility]    Humiliation, Afraid, Rape, and Kick questionnaire     Fear of Current or Ex-Partner: No     Emotionally Abused: No     Physically Abused: No     Sexually Abused: No   Housing Stability: Unknown (1/16/2024)    Housing Stability Vital Sign     Unable to Pay for Housing in the Last Year: No     Number of Places Lived in the Last Year: Not on file     Unstable Housing in the Last Year: No        Review of Systems   Constitutional:  Negative for chills and fever.   HENT:  Negative for sore throat.    Respiratory:  Negative for cough and shortness of breath.    Cardiovascular:  Negative for chest pain.   Gastrointestinal:  Negative for nausea and vomiting.   Musculoskeletal:  Negative for gait problem and joint swelling.   Skin:  Negative for wound.   Neurological:  Negative for weakness.   Hematological: Negative.    Psychiatric/Behavioral:  Negative  "for behavioral problems and confusion.          Objective:      /83   Pulse 96   Resp 18   Ht 5' 1\" (1.549 m)   Wt 57.2 kg (126 lb)   BMI 23.81 kg/m²          Physical Exam  Vitals reviewed.   Constitutional:       General: She is not in acute distress.     Appearance: She is not toxic-appearing or diaphoretic.   HENT:      Head: Normocephalic and atraumatic.   Eyes:      Extraocular Movements: Extraocular movements intact.   Cardiovascular:      Rate and Rhythm: Normal rate and regular rhythm.      Pulses: Normal pulses. no weak pulses.           Dorsalis pedis pulses are 2+ on the right side and 2+ on the left side.        Posterior tibial pulses are 2+ on the right side and 2+ on the left side.   Pulmonary:      Effort: Pulmonary effort is normal. No respiratory distress.   Musculoskeletal:         General: No swelling or signs of injury.      Cervical back: Normal range of motion and neck supple.      Right lower leg: No edema.      Left lower leg: No edema.      Right foot: No Charcot foot or foot drop.      Left foot: No Charcot foot or foot drop.   Feet:      Right foot:      Protective Sensation: 10 sites tested.  10 sites sensed.      Skin integrity: No ulcer, skin breakdown or erythema.      Left foot:      Protective Sensation: 10 sites tested.  10 sites sensed.      Skin integrity: No ulcer, skin breakdown or erythema.   Skin:     General: Skin is warm.      Capillary Refill: Capillary refill takes less than 2 seconds.      Coloration: Skin is not cyanotic or mottled.      Findings: No abscess.      Nails: There is no clubbing.      Comments: Small verrucous lesion X 2 on left great toe and arch with mild keratosis and punctate bleeding.   Neurological:      General: No focal deficit present.      Mental Status: She is alert and oriented to person, place, and time.      Cranial Nerves: No cranial nerve deficit.      Sensory: No sensory deficit.      Motor: No weakness.      Coordination: " Coordination normal.   Psychiatric:         Mood and Affect: Mood normal.         Behavior: Behavior normal.         Thought Content: Thought content normal.         Judgment: Judgment normal.         Diabetic Foot Exam    Patient's shoes and socks removed.    Right Foot/Ankle   Right Foot Inspection  Skin Exam: skin intact. No erythema, no maceration, no pre-ulcer and no ulcer.     Toe Exam: ROM and strength within normal limits. No swelling, no tenderness, erythema and  no right toe deformity    Sensory   Proprioception: intact  Monofilament testing: intact    Vascular  Capillary refills: < 3 seconds  The right DP pulse is 2+. The right PT pulse is 2+.     Left Foot/Ankle  Left Foot Inspection  Skin Exam: skin intact. No erythema, no maceration, no pre-ulcer and no ulcer.     Toe Exam: ROM and strength within normal limits. No swelling, no tenderness, no erythema and no left toe deformity.     Sensory   Proprioception: intact  Monofilament testing: intact    Vascular  Capillary refills: < 3 seconds  The left DP pulse is 2+. The left PT pulse is 2+.     Assign Risk Category  No deformity present  No loss of protective sensation  No weak pulses  Risk: 0

## 2024-04-26 NOTE — LETTER
April 26, 2024     Emily Perales MD  [address]  [address]  [facility] [address]    Patient: Jenny Rodrigues   YOB: 1995   Date of Visit: 4/26/2024       Dear Dr. Perales:    Thank you for referring Jenny Rodrigues to me for evaluation. Below are my notes for this consultation.    If you have questions, please do not hesitate to call me. I look forward to following your patient along with you.         Sincerely,        Andrew Mariee DPM        CC: MARGARET Meadows DPM  4/26/2024 12:43 PM  Sign when Signing Visit     PATIENT:  Jenny Rodrigues    1995    ASSESSMENT:     1. Uncontrolled type 1 diabetes mellitus with hypoglycemia without coma (HCC)  Ambulatory referral to Podiatry      2. Plantar wart  Lesion Destruction            PLAN:  1.  Reviewed medical records.  Reviewed the note from PCP.  Patient was counseled on the condition and diagnosis.    2.  Educated disease prevention and risks related to diabetes.    3.  Educated proper daily foot care and exam.  Instructed proper skin care / protection and footwear.  Instructed to identify any signs of infection and related foot problem.    4.  The recent blood work was reviewed / discussed and the last HbA1c was 9.6.  Discussed proper blood glucose control with diet and exercise.    5. Skin lesions on her left foot are most consistent with plantar wart.  Discussed options and the patient wished to proceed with chemical cauterization.  Verbal consent was obtained from the patient. All the verrucoid lesion(s) and any surround hyperkeratotic skin lesions were debrided to a level of pinpoint bleeding using a sterile #15 scapel.  The lesions were then cauterized with Cantharidin.  An occlusive dressing was applied to the areas.  Instructed to remove the dressing in the evening. Instruction was given for possible local care.  The patient tolerated the procedure well and without complications.   6. The patient will return in 3  "weeks.      Imaging: I have personally reviewed pertinent films in PACS  Labs, pathology, and Other Studies: I have personally reviewed pertinent reports.      Lesion Destruction    Date/Time: 4/26/2024 9:00 AM    Performed by: Andrew Mariee DPM  Authorized by: Andrew Mariee DPM  Universal Protocol:  Consent: Verbal consent obtained.  Risks and benefits: risks, benefits and alternatives were discussed  Consent given by: patient  Time out: Immediately prior to procedure a \"time out\" was called to verify the correct patient, procedure, equipment, support staff and site/side marked as required.  Timeout called at: 4/26/2024 9:39 PM.  Patient understanding: patient states understanding of the procedure being performed  Patient identity confirmed: verbally with patient    Procedure Details - Lesion Destruction:     Number of Lesions:  2  Lesion 1:     Body area:  Lower extremity    Lower extremity location:  L big toe    Malignancy: benign lesion      Destruction method: chemical removal    Lesion 2:     Body area:  Lower extremity    Lower extremity location:  L foot    Malignancy: benign lesion      Destruction method: chemical removal          Subjective:          HPI  The patient was referred to my office for diabetic foot evaluation and evaluation of painful lesions on left foot.  The patient has diabetes since 2019.  Her BS has been high since she was sick.  No history of diabetic foot ulcer or related foot infection.  She has mild numbness and tingling in her feet.  Denied weakness or significant functional deficit.  She complained of sharp pain in her left foot.  She had them for a couple of years without active treatment.      The following portions of the patient's history were reviewed and updated as appropriate: allergies, current medications, past family history, past medical history, past social history, past surgical history and problem list.  All pertinent labs and images were reviewed.    Past Medical " "History  Past Medical History:   Diagnosis Date   • Abdominal pain    • Anaphylaxis    • Anxiety    • Anxiety and depression    • COVID-19 12/2020   • Delayed emergence from anesthesia 03/23/2023    Severe episode of emergence delirium (confused, combative) after MAC anesthetic on 03/2023 for EGD. Received versed 2mg, >50mcg precedex, 5mg IV haldol, and 50mcg fentanyl. Waxing and waning episodes of agitation. Any future anesthetics should NOT be done at [facility].   • Delirium, induced by drug     anesthesia   • Depression    • Diabetes mellitus (HCC)    • Disease of thyroid gland     Hashimoto   • DKA, type 1 (HCC) 05/11/2021   • Eating disorder     history of anorexia/bulemia 9712-7502   • Food intolerance    • Fracture of fibula     R Salter I   • Gilbert disease    • Hashimoto's disease 02/21/2020   • Head injury    • Headache(784.0)    • Nasal congestion    • PTSD (post-traumatic stress disorder)    • Rectal bleed    • Seizures (HCC)    • Type 1 diabetes (HCC)        Past Surgical History  Past Surgical History:   Procedure Laterality Date   • COLONOSCOPY     • EGD  03/23/2023   • KNEE SURGERY Right 07/06/2020   • NASAL SEPTOPLASTY W/ TURBINOPLASTY     • SINUS SURGERY     • SKIN BIOPSY     • TURBINOPLASTY N/A 03/22/2021    Procedure: TURBINOPLASTY;  Surgeon: Jn Hidalgo MD;  Location: [facility];  Service: ENT   • UPPER GASTROINTESTINAL ENDOSCOPY     • WISDOM TOOTH EXTRACTION     • WRIST SURGERY      left; Excision of ganglion        Allergies:  Insulin glargine    Medications:  Current Outpatient Medications   Medication Sig Dispense Refill   • BD Insulin Syringe U/F 31G X 5/16\" 0.5 ML MISC Use as directly with weekly methotrexate injection. 100 each 0   • Belimumab (BENLYSTA IV) Inject into a catheter in a vein every month to absorb continually Switching to monthly on 12/5     • benzonatate (TESSALON PERLES) 100 mg capsule Take 1 capsule (100 mg total) by mouth 3 (three) times a day as needed for cough " (Patient not taking: Reported on 3/13/2024) 20 capsule 0   • ciprofloxacin-dexamethasone (CIPRODEX) otic suspension Administer 4 drops into the left ear 2 (two) times a day (Patient not taking: Reported on 3/13/2024) 7.5 mL 0   • clindamycin (CLEOCIN T) 1 % lotion Apply 1 application. topically 2 (two) times a day     • Cyanocobalamin 1000 MCG SUBL Place 1 tablet (1,000 mcg total) under the tongue daily 90 tablet 0   • famotidine (Pepcid) 20 mg tablet Take 20 mg by mouth 2 (two) times a day (Patient not taking: Reported on 4/19/2024)     • Fluticasone-Salmeterol (Advair) 250-50 mcg/dose inhaler Inhale 1 puff 2 (two) times a day Rinse mouth after use. (Patient not taking: Reported on 3/13/2024) 60 blister 0   • folic acid (FOLVITE) 1 mg tablet Take 1 tablet (1 mg total) by mouth daily 90 tablet 3   • gabapentin (Neurontin) 600 MG tablet Take 1 tablet (600 mg total) by mouth daily 90 tablet 1   • Glucagon (Gvoke HypoPen 2-Pack) 1 MG/0.2ML SOAJ 1 mg PRN for severe hypoglycemia 0.4 mL 0   • glucagon 1 MG injection 1 mg (Patient not taking: Reported on 3/13/2024)     • Glucagon HCl (Glucagon Emergency) 1 MG/ML SOLR INJECT 1 MG AS DIRECTED AS NEEDED (WHEN PASSED OUT FROM LOW BLOOD SUGAR). (Patient not taking: Reported on 2/14/2024)     • guaifenesin-codeine (GUAIFENESIN AC) 100-10 MG/5ML liquid Take 5 mL by mouth 3 (three) times a day as needed for cough (Patient not taking: Reported on 3/13/2024) 150 mL 0   • hydroxychloroquine (PLAQUENIL) 200 mg tablet Take 1 tablet (200 mg total) by mouth 2 (two) times a day with meals 180 tablet 3   • Insulin Disposable Pump (Omnipod 5 G6 Pod, Gen 5,) MISC      • insulin lispro (HumaLOG) 100 units/mL injection Inject 15 Units under the skin 3 (three) times a day before meals (Patient not taking: Reported on 2/14/2024) 10 mL 0   • Insulin Pen Needle (BD PEN NEEDLE NASIM U/F) 32G X 4 MM MISC by Does not apply route 4 (four) times a day for 180 days 400 each 3   • levonorgestrel-ethinyl  estradiol (NORDETTE) 0.15-30 MG-MCG per tablet Take 1 tablet by mouth daily 84 tablet 1   • levothyroxine 25 mcg tablet Take 1 tablet (25 mcg total) by mouth daily 60 tablet 0   • LORazepam (Ativan) 0.5 mg tablet Take 1 tablet (0.5 mg total) by mouth 2 (two) times a day as needed for anxiety 60 tablet 0   • metFORMIN (GLUCOPHAGE-XR) 500 mg 24 hr tablet TAKE 1 TABLET BY MOUTH EVERY DAY WITH DINNER 90 tablet 1   • methotrexate 50 MG/2ML injection Inject 0.8 mL (20 mg total) under the skin once a week 8 mL 4   • miconazole 2 % cream Apply 1 Application topically 2 (two) times a day To affected area (Patient not taking: Reported on 3/13/2024)     • NovoLOG 100 UNIT/ML injection Up to 100 units per day with insulin pump 90 mL 3   • OneTouch Verio test strip USE 4 TIMES A DAY BEFORE MEALS AND AT BEDTIME     • sodium chloride 1 g tablet Take 1 tablet (1 g total) by mouth if needed (for dizziness an lightheadendess in setting of low BP) 30 tablet 0   • Tresiba FlexTouch 100 units/mL injection pen Inject 30 Units under the skin daily 15 mL 0     No current facility-administered medications for this visit.       Social History:  Social History     Socioeconomic History   • Marital status: /Civil Union     Spouse name: None   • Number of children: 0   • Years of education: None   • Highest education level: Associate degree: academic program   Occupational History   • Occupation: unemployed   Tobacco Use   • Smoking status: Never     Passive exposure: Never   • Smokeless tobacco: Never   • Tobacco comments:     Tobacco smoke exposure (Father smokes cigars)   Vaping Use   • Vaping status: Never Used   Substance and Sexual Activity   • Alcohol use: Yes     Comment: rarely   • Drug use: Never   • Sexual activity: Yes     Partners: Male     Birth control/protection: Condom Male, OCP   Other Topics Concern   • None   Social History Narrative    Student at [employer]    Reports a poor diet; low in vegetables, high in sweets     Dental care, regularly    Lives with parents    Sleeps 8-10 hours a day        Do you have pets? Dog,cat Is pet allowed in bedroom?Yes    Are you a smoker? Never    Does anyone smoke in your home? No       Do you live with smokers? Yes    [place] frequently? No   How many times a year? N/A      Social Determinants of Health     Financial Resource Strain: Low Risk  (7/8/2023)    Received from [facility]    Overall Financial Resource Strain (CARDIA)    • Difficulty of Paying Living Expenses: Not hard at all   Food Insecurity: No Food Insecurity (1/16/2024)    Hunger Vital Sign    • Worried About Running Out of Food in the Last Year: Never true    • Ran Out of Food in the Last Year: Never true   Transportation Needs: No Transportation Needs (1/16/2024)    PRAPARE - Transportation    • Lack of Transportation (Medical): No    • Lack of Transportation (Non-Medical): No   Physical Activity: Insufficiently Active (8/7/2020)    Exercise Vital Sign    • Days of Exercise per Week: 7 days    • Minutes of Exercise per Session: 10 min   Stress: Stress Concern Present (8/7/2020)    [employer] - Occupational Stress Questionnaire    • Feeling of Stress : Rather much   Social Connections: Unknown (8/7/2020)    Social Connection and Isolation Panel [NHANES]    • Frequency of Communication with Friends and Family: More than three times a week    • Frequency of Social Gatherings with Friends and Family: Not on file    • Attends Yazdanism Services: Not on file    • Active Member of Clubs or Organizations: No    • Attends Club or Organization Meetings: Never    • Marital Status: Never    Intimate Partner Violence: Not At Risk (7/8/2023)    Received from [facility]    Humiliation, Afraid, Rape, and Kick questionnaire    • Fear of Current or Ex-Partner: No    • Emotionally Abused: No    • Physically Abused: No    • Sexually Abused: No   Housing Stability: Unknown  "(1/16/2024)    Housing Stability Vital Sign    • Unable to Pay for Housing in the Last Year: No    • Number of Places Lived in the Last Year: Not on file    • Unstable Housing in the Last Year: No        Review of Systems   Constitutional:  Negative for chills and fever.   HENT:  Negative for sore throat.    Respiratory:  Negative for cough and shortness of breath.    Cardiovascular:  Negative for chest pain.   Gastrointestinal:  Negative for nausea and vomiting.   Musculoskeletal:  Negative for gait problem and joint swelling.   Skin:  Negative for wound.   Neurological:  Negative for weakness.   Hematological: Negative.    Psychiatric/Behavioral:  Negative for behavioral problems and confusion.          Objective:      /83   Pulse 96   Resp 18   Ht 5' 1\" (1.549 m)   Wt 57.2 kg (126 lb)   BMI 23.81 kg/m²          Physical Exam  Vitals reviewed.   Constitutional:       General: She is not in acute distress.     Appearance: She is not toxic-appearing or diaphoretic.   HENT:      Head: Normocephalic and atraumatic.   Eyes:      Extraocular Movements: Extraocular movements intact.   Cardiovascular:      Rate and Rhythm: Normal rate and regular rhythm.      Pulses: Normal pulses. no weak pulses.           Dorsalis pedis pulses are 2+ on the right side and 2+ on the left side.        Posterior tibial pulses are 2+ on the right side and 2+ on the left side.   Pulmonary:      Effort: Pulmonary effort is normal. No respiratory distress.   Musculoskeletal:         General: No swelling or signs of injury.      Cervical back: Normal range of motion and neck supple.      Right lower leg: No edema.      Left lower leg: No edema.      Right foot: No Charcot foot or foot drop.      Left foot: No Charcot foot or foot drop.   Feet:      Right foot:      Protective Sensation: 10 sites tested.  10 sites sensed.      Skin integrity: No ulcer, skin breakdown or erythema.      Left foot:      Protective Sensation: 10 sites " tested.  10 sites sensed.      Skin integrity: No ulcer, skin breakdown or erythema.   Skin:     General: Skin is warm.      Capillary Refill: Capillary refill takes less than 2 seconds.      Coloration: Skin is not cyanotic or mottled.      Findings: No abscess.      Nails: There is no clubbing.      Comments: Small verrucous lesion X 2 on left great toe and arch with mild keratosis and punctate bleeding.   Neurological:      General: No focal deficit present.      Mental Status: She is alert and oriented to person, place, and time.      Cranial Nerves: No cranial nerve deficit.      Sensory: No sensory deficit.      Motor: No weakness.      Coordination: Coordination normal.   Psychiatric:         Mood and Affect: Mood normal.         Behavior: Behavior normal.         Thought Content: Thought content normal.         Judgment: Judgment normal.         Diabetic Foot Exam    Patient's shoes and socks removed.    Right Foot/Ankle   Right Foot Inspection  Skin Exam: skin intact. No erythema, no maceration, no pre-ulcer and no ulcer.     Toe Exam: ROM and strength within normal limits. No swelling, no tenderness, erythema and  no right toe deformity    Sensory   Proprioception: intact  Monofilament testing: intact    Vascular  Capillary refills: < 3 seconds  The right DP pulse is 2+. The right PT pulse is 2+.     Left Foot/Ankle  Left Foot Inspection  Skin Exam: skin intact. No erythema, no maceration, no pre-ulcer and no ulcer.     Toe Exam: ROM and strength within normal limits. No swelling, no tenderness, no erythema and no left toe deformity.     Sensory   Proprioception: intact  Monofilament testing: intact    Vascular  Capillary refills: < 3 seconds  The left DP pulse is 2+. The left PT pulse is 2+.     Assign Risk Category  No deformity present  No loss of protective sensation  No weak pulses  Risk: 0

## 2024-05-02 ENCOUNTER — OFFICE VISIT (OUTPATIENT)
Dept: GASTROENTEROLOGY | Facility: CLINIC | Age: 29
End: 2024-05-02
Payer: COMMERCIAL

## 2024-05-02 VITALS
DIASTOLIC BLOOD PRESSURE: 85 MMHG | HEART RATE: 99 BPM | SYSTOLIC BLOOD PRESSURE: 131 MMHG | BODY MASS INDEX: 23.86 KG/M2 | WEIGHT: 126.4 LBS | HEIGHT: 61 IN

## 2024-05-02 DIAGNOSIS — R11.2 NAUSEA AND VOMITING, UNSPECIFIED VOMITING TYPE: Primary | ICD-10-CM

## 2024-05-02 DIAGNOSIS — K21.9 GASTROESOPHAGEAL REFLUX DISEASE, UNSPECIFIED WHETHER ESOPHAGITIS PRESENT: ICD-10-CM

## 2024-05-02 DIAGNOSIS — R10.13 EPIGASTRIC PAIN: ICD-10-CM

## 2024-05-02 DIAGNOSIS — R13.12 OROPHARYNGEAL DYSPHAGIA: ICD-10-CM

## 2024-05-02 PROCEDURE — 99213 OFFICE O/P EST LOW 20 MIN: CPT | Performed by: PHYSICIAN ASSISTANT

## 2024-05-02 NOTE — LETTER
May 2, 2024     MARGARET Meadows  [address] [facility]  [address]  [place] [address]    Patient: Jenny Rodrigues   YOB: 1995   Date of Visit: 5/2/2024       Dear Dr. Gaitan:    Thank you for referring Jenny Rodrigues to me for evaluation. Below are my notes for this consultation.    If you have questions, please do not hesitate to call me. I look forward to following your patient along with you.         Sincerely,        Satnam Garcia PA-C        CC: No Recipients    Satnam Garcia PA-C  5/2/2024 10:50 AM  Incomplete  [facility] - Outpatient Progress Note  Jenny Rodrigues 29 y.o. female MRN: [id number]  Encounter: [id number]    Assessment and Plan    1. Epigastric pain  - EGD March 23, 2023 only showed mild gastritis.  Gastric biopsies negative for H. pylori and gastric intestinal metaplasia.  Duodenal biopsy negative for celiac disease.  - Mesenteric doppler April 9, 2024 was normal,   - gastric emptying study April 17, 2024 was normal.    - Stool H. pylori was negative  - To consider repeat HIDA    2. Nausea and vomiting, unspecified vomiting type  -Gastric emptying study April 17, 2024 normal    3. Gastroesophageal reflux disease, unspecified whether esophagitis present  - pepcid AC as needed    4. Dysphagia  - get video swallow  - barium esophagram      --------------------------------------------------------------------------------------------------------------------    Chief Complaint: Epigastric pain, nausea vomiting, greasy stool    HPI: Jenny Rodrigues is a 29 y.o. female with history of type 1 diabetes, anxiety/depression, Hashimoto's thyroiditis, undifferentiated connective tissue disease who presents for follow-up.  Last seen in the office on February 22, 2023 for intermittent right upper quadrant abdominal pain, nausea vomiting and stearrhea     Last EGD March 23, 2023 only showed mild gastritis.  Gastric biopsies negative for  H. pylori and gastric intestinal metaplasia.  Duodenal biopsy negative for celiac disease.     She had normal gastric emptying study in 2021, normal UGI series 5/2022, normal RUQ ultrasound 9/2022, normal HIDA scan 10/2022.  Previous celiac disease antibody testing negative.  Normal colonoscopy in 10/2019.  Recent CT scan on January 14, 2024 for chest and abdominal pain with IV contrast showed no acute abnormality.  She did have a fecal elastase done on March 1, 2023 which was low at 135 suggesting moderate exocrine pancreatic insufficiency.  Creon not really helpful.   Some mild weight loss.  No diarrhea.    Interval history:  Mesenteric doppler April 9, 2024 was normal, gastric emptying study April 17, 2024 was normal.  Stool H. pylori was negative    Dysphagia  How long with symptoms - 3-4 weeks  Where does food feel like it gets stuck - high in throat  Solids same and liquids - solids worse  Need to regurgitate food - No   Pain with swallowing - No  History of seasonal or environmental allergies - No  History of Asthma - No  History of GERD - Mild  Unintentional weight loss - No   Previous esophageal imaging - No , but has video swallow ordered        Endoscopy History:  EGD - March 23, 2023 only showed mild gastritis.  Gastric biopsies negative for H. pylori and gastric intestinal metaplasia.  Duodenal biopsy negative for celiac disease.  Colonoscopy -October 2019 normal    Review of Systems:   General: negative for fatigue, fever, night sweats or unexpected weight loss  Psychological: negative for anxiety or depression  Ophthalmic: negative for blurry vision or scleral icterus  ENT: negative for headaches, sore throat or dysphagia  Hematological and Lymphatic: negative for pallor or swollen lymph nodes  Respiratory: negative for cough, shortness of breath or wheezing  Cardiovascular: negative for chest pain, edema or murmur  Gastrointestinal: as mentioned in HPI  Genito-Urinary: negative for dysuria or  "incontinence  Musculoskeletal: negative for joint pain, joint stiffness or joint swelling  Dermatological: negative for pruritus, rash, or jaundice    Current Medications  Current Outpatient Medications   Medication Sig Dispense Refill   • Belimumab (BENLYSTA IV) Inject into a catheter in a vein every month to absorb continually Switching to monthly on 12/5     • clindamycin (CLEOCIN T) 1 % lotion Apply 1 application. topically 2 (two) times a day     • Cyanocobalamin 1000 MCG SUBL Place 1 tablet (1,000 mcg total) under the tongue daily 90 tablet 0   • folic acid (FOLVITE) 1 mg tablet Take 1 tablet (1 mg total) by mouth daily 90 tablet 3   • gabapentin (Neurontin) 600 MG tablet Take 1 tablet (600 mg total) by mouth daily 90 tablet 1   • Glucagon (Gvoke HypoPen 2-Pack) 1 MG/0.2ML SOAJ 1 mg PRN for severe hypoglycemia 0.4 mL 0   • Glucagon HCl (Glucagon Emergency) 1 MG/ML SOLR      • levonorgestrel-ethinyl estradiol (NORDETTE) 0.15-30 MG-MCG per tablet Take 1 tablet by mouth daily 84 tablet 1   • levothyroxine 25 mcg tablet Take 1 tablet (25 mcg total) by mouth daily 60 tablet 0   • LORazepam (Ativan) 0.5 mg tablet Take 1 tablet (0.5 mg total) by mouth 2 (two) times a day as needed for anxiety 60 tablet 0   • metFORMIN (GLUCOPHAGE-XR) 500 mg 24 hr tablet TAKE 1 TABLET BY MOUTH EVERY DAY WITH DINNER 90 tablet 1   • methotrexate 50 MG/2ML injection Inject 0.8 mL (20 mg total) under the skin once a week 8 mL 4   • NovoLOG 100 UNIT/ML injection Up to 100 units per day with insulin pump 90 mL 3   • Tresiba FlexTouch 100 units/mL injection pen Inject 30 Units under the skin daily 15 mL 0   • BD Insulin Syringe U/F 31G X 5/16\" 0.5 ML MISC Use as directly with weekly methotrexate injection. (Patient not taking: Reported on 5/2/2024) 100 each 0   • benzonatate (TESSALON PERLES) 100 mg capsule Take 1 capsule (100 mg total) by mouth 3 (three) times a day as needed for cough (Patient not taking: Reported on 5/2/2024) 20 capsule 0 "   • ciprofloxacin-dexamethasone (CIPRODEX) otic suspension Administer 4 drops into the left ear 2 (two) times a day (Patient not taking: Reported on 3/13/2024) 7.5 mL 0   • famotidine (Pepcid) 20 mg tablet Take 20 mg by mouth 2 (two) times a day (Patient not taking: Reported on 4/19/2024)     • Fluticasone-Salmeterol (Advair) 250-50 mcg/dose inhaler Inhale 1 puff 2 (two) times a day Rinse mouth after use. (Patient not taking: Reported on 3/13/2024) 60 blister 0   • glucagon 1 MG injection 1 mg (Patient not taking: Reported on 5/2/2024)     • guaifenesin-codeine (GUAIFENESIN AC) 100-10 MG/5ML liquid Take 5 mL by mouth 3 (three) times a day as needed for cough (Patient not taking: Reported on 3/13/2024) 150 mL 0   • hydroxychloroquine (PLAQUENIL) 200 mg tablet Take 1 tablet (200 mg total) by mouth 2 (two) times a day with meals 180 tablet 3   • Insulin Disposable Pump (Omnipod 5 G6 Pod, Gen 5,) MISC  (Patient not taking: Reported on 5/2/2024)     • insulin lispro (HumaLOG) 100 units/mL injection Inject 15 Units under the skin 3 (three) times a day before meals (Patient not taking: Reported on 2/14/2024) 10 mL 0   • Insulin Pen Needle (BD PEN NEEDLE NASIM U/F) 32G X 4 MM MISC by Does not apply route 4 (four) times a day for 180 days 400 each 3   • miconazole 2 % cream Apply 1 Application topically 2 (two) times a day To affected area (Patient not taking: Reported on 3/13/2024)     • OneTouch Verio test strip USE 4 TIMES A DAY BEFORE MEALS AND AT BEDTIME (Patient not taking: Reported on 5/2/2024)     • sodium chloride 1 g tablet Take 1 tablet (1 g total) by mouth if needed (for dizziness an lightheadendess in setting of low BP) 30 tablet 0     No current facility-administered medications for this visit.       Past Medical History  Past Medical History:   Diagnosis Date   • Abdominal pain    • Anaphylaxis    • Anxiety    • Anxiety and depression    • COVID-19 12/2020   • Delayed emergence from anesthesia 03/23/2023     Severe episode of emergence delirium (confused, combative) after MAC anesthetic on 03/2023 for EGD. Received versed 2mg, >50mcg precedex, 5mg IV haldol, and 50mcg fentanyl. Waxing and waning episodes of agitation. Any future anesthetics should NOT be done at [facility].   • Delirium, induced by drug     anesthesia   • Depression    • Diabetes mellitus (HCC)    • Disease of thyroid gland     Hashimoto   • DKA, type 1 (HCC) 05/11/2021   • Eating disorder     history of anorexia/bulemia [id number]   • Food intolerance    • Fracture of fibula     R Salter I   • Gilbert disease    • Hashimoto's disease 02/21/2020   • Head injury    • Headache(784.0)    • Nasal congestion    • PTSD (post-traumatic stress disorder)    • Rectal bleed    • Seizures (HCC)    • Type 1 diabetes (HCC)        Past Surgical History  Past Surgical History:   Procedure Laterality Date   • COLONOSCOPY     • EGD  03/23/2023   • KNEE SURGERY Right 07/06/2020   • NASAL SEPTOPLASTY W/ TURBINOPLASTY     • SINUS SURGERY     • SKIN BIOPSY     • TURBINOPLASTY N/A 03/22/2021    Procedure: TURBINOPLASTY;  Surgeon: Jn Hidalgo MD;  Location: [facility];  Service: ENT   • UPPER GASTROINTESTINAL ENDOSCOPY     • WISDOM TOOTH EXTRACTION     • WRIST SURGERY      left; Excision of ganglion       Past Social History   Social History     Socioeconomic History   • Marital status: /Civil Union     Spouse name: None   • Number of children: 0   • Years of education: None   • Highest education level: Associate degree: academic program   Occupational History   • Occupation: unemployed   Tobacco Use   • Smoking status: Never     Passive exposure: Never   • Smokeless tobacco: Never   • Tobacco comments:     Tobacco smoke exposure (Father smokes cigars)   Vaping Use   • Vaping status: Never Used   Substance and Sexual Activity   • Alcohol use: Yes     Comment: rarely   • Drug use: Never   • Sexual activity: Yes     Partners: Male     Birth control/protection: Condom  Male, OCP   Other Topics Concern   • None   Social History Narrative    Student at [employer]    Reports a poor diet; low in vegetables, high in sweets    Dental care, regularly    Lives with parents    Sleeps 8-10 hours a day        Do you have pets? Dog,cat Is pet allowed in bedroom?Yes    Are you a smoker? Never    Does anyone smoke in your home? No       Do you live with smokers? Yes    [place] frequently? No   How many times a year? N/A      Social Determinants of Health     Financial Resource Strain: Low Risk  (7/8/2023)    Received from     Overall Financial Resource Strain (CARDIA)    • Difficulty of Paying Living Expenses: Not hard at all   Food Insecurity: No Food Insecurity (1/16/2024)    Hunger Vital Sign    • Worried About Running Out of Food in the Last Year: Never true    • Ran Out of Food in the Last Year: Never true   Transportation Needs: No Transportation Needs (1/16/2024)    PRAPARE - Transportation    • Lack of Transportation (Medical): No    • Lack of Transportation (Non-Medical): No   Physical Activity: Insufficiently Active (8/7/2020)    Exercise Vital Sign    • Days of Exercise per Week: 7 days    • Minutes of Exercise per Session: 10 min   Stress: Stress Concern Present (8/7/2020)    [employer] - Occupational Stress Questionnaire    • Feeling of Stress : Rather much   Social Connections: Unknown (8/7/2020)    Social Connection and Isolation Panel [NHANES]    • Frequency of Communication with Friends and Family: More than three times a week    • Frequency of Social Gatherings with Friends and Family: Not on file    • Attends Orthodox Services: Not on file    • Active Member of Clubs or Organizations: No    • Attends Club or Organization Meetings: Never    • Marital Status: Never    Intimate Partner Violence: Not At Risk (7/8/2023)    Received from     Humiliation, Afraid, Rape, and Kick questionnaire  "   • Fear of Current or Ex-Partner: No    • Emotionally Abused: No    • Physically Abused: No    • Sexually Abused: No   Housing Stability: Unknown (1/16/2024)    Housing Stability Vital Sign    • Unable to Pay for Housing in the Last Year: No    • Number of Places Lived in the Last Year: Not on file    • Unstable Housing in the Last Year: No       Vital Signs  Vitals:    05/02/24 1036   BP: 131/85   BP Location: Left arm   Patient Position: Sitting   Cuff Size: Standard   Pulse: 99   Weight: 57.3 kg (126 lb 6.4 oz)   Height: 5' 1\" (1.549 m)         Physical Exam:  General appearance: alert, cooperative, no distress  HEENT: normocephalic, anicteric, no eye erythema or discharge, no oropharyngeal thrush  Neck: supple  Lungs: CTA b/l, no rales, rhonchi, or wheezing, unlabored respirations  Heart: RRR, no murmur, rubs, or gallops  Abdomen: soft, non-tender, non-distended, normal bowel sounds, no masses or organomegaly  Rectal: deferred  Extremities: no cyanosis, clubbing, or edema  Musculoskeletal: normal gait  Skin: color and texture normal, no jaundice, no rashes or lesions  Psychiatric: alert and oriented, normal affect and behavior                                                          Satnam Garcia PA-C  5/2/2024 10:40 AM  Sign when Signing Visit  [facility] - Outpatient Progress Note  Jenny Rodrigues 29 y.o. female MRN: [id number]  Encounter: [id number]    Assessment and Plan    1. Epigastric pain  - EGD March 23, 2023 only showed mild gastritis.  Gastric biopsies negative for H. pylori and gastric intestinal metaplasia.  Duodenal biopsy negative for celiac disease.  - Mesenteric doppler April 9, 2024 was normal,   - gastric emptying study April 17, 2024 was normal.    - Stool H. pylori was negative  - To consider repeat HIDA    2. Nausea and vomiting, unspecified vomiting type  -Gastric emptying study April 17, 2024 normal    3. " Gastroesophageal reflux disease, unspecified whether esophagitis present  - pepcid AC as needed      --------------------------------------------------------------------------------------------------------------------    Chief Complaint: Epigastric pain, nausea vomiting, greasy stool    HPI: Jenny Rodrigues is a 29 y.o. female with history of type 1 diabetes, anxiety/depression, Hashimoto's thyroiditis, undifferentiated connective tissue disease who presents for follow-up.  Last seen in the office on February 22, 2023 for intermittent right upper quadrant abdominal pain, nausea vomiting and stearrhea     Last EGD March 23, 2023 only showed mild gastritis.  Gastric biopsies negative for H. pylori and gastric intestinal metaplasia.  Duodenal biopsy negative for celiac disease.     She had normal gastric emptying study in 2021, normal UGI series 5/2022, normal RUQ ultrasound 9/2022, normal HIDA scan 10/2022.  Previous celiac disease antibody testing negative.  Normal colonoscopy in 10/2019.  Recent CT scan on January 14, 2024 for chest and abdominal pain with IV contrast showed no acute abnormality.  She did have a fecal elastase done on March 1, 2023 which was low at 135 suggesting moderate exocrine pancreatic insufficiency.  Creon not really helpful.   Some mild weight loss.  No diarrhea.    Interval history:  Mesenteric doppler April 9, 2024 was normal, gastric emptying study April 17, 2024 was normal.  Stool H. pylori was negative    Endoscopy History:  EGD - March 23, 2023 only showed mild gastritis.  Gastric biopsies negative for H. pylori and gastric intestinal metaplasia.  Duodenal biopsy negative for celiac disease.  Colonoscopy -October 2019 normal    Review of Systems:   General: negative for fatigue, fever, night sweats or unexpected weight loss  Psychological: negative for anxiety or depression  Ophthalmic: negative for blurry vision or scleral icterus  ENT: negative for headaches, sore throat or  "dysphagia  Hematological and Lymphatic: negative for pallor or swollen lymph nodes  Respiratory: negative for cough, shortness of breath or wheezing  Cardiovascular: negative for chest pain, edema or murmur  Gastrointestinal: as mentioned in HPI  Genito-Urinary: negative for dysuria or incontinence  Musculoskeletal: negative for joint pain, joint stiffness or joint swelling  Dermatological: negative for pruritus, rash, or jaundice    Current Medications  Current Outpatient Medications   Medication Sig Dispense Refill   • Belimumab (BENLYSTA IV) Inject into a catheter in a vein every month to absorb continually Switching to monthly on 12/5     • clindamycin (CLEOCIN T) 1 % lotion Apply 1 application. topically 2 (two) times a day     • Cyanocobalamin 1000 MCG SUBL Place 1 tablet (1,000 mcg total) under the tongue daily 90 tablet 0   • folic acid (FOLVITE) 1 mg tablet Take 1 tablet (1 mg total) by mouth daily 90 tablet 3   • gabapentin (Neurontin) 600 MG tablet Take 1 tablet (600 mg total) by mouth daily 90 tablet 1   • Glucagon (Gvoke HypoPen 2-Pack) 1 MG/0.2ML SOAJ 1 mg PRN for severe hypoglycemia 0.4 mL 0   • Glucagon HCl (Glucagon Emergency) 1 MG/ML SOLR      • levonorgestrel-ethinyl estradiol (NORDETTE) 0.15-30 MG-MCG per tablet Take 1 tablet by mouth daily 84 tablet 1   • levothyroxine 25 mcg tablet Take 1 tablet (25 mcg total) by mouth daily 60 tablet 0   • LORazepam (Ativan) 0.5 mg tablet Take 1 tablet (0.5 mg total) by mouth 2 (two) times a day as needed for anxiety 60 tablet 0   • metFORMIN (GLUCOPHAGE-XR) 500 mg 24 hr tablet TAKE 1 TABLET BY MOUTH EVERY DAY WITH DINNER 90 tablet 1   • methotrexate 50 MG/2ML injection Inject 0.8 mL (20 mg total) under the skin once a week 8 mL 4   • NovoLOG 100 UNIT/ML injection Up to 100 units per day with insulin pump 90 mL 3   • Tresiba FlexTouch 100 units/mL injection pen Inject 30 Units under the skin daily 15 mL 0   • BD Insulin Syringe U/F 31G X 5/16\" 0.5 ML MISC Use " as directly with weekly methotrexate injection. (Patient not taking: Reported on 5/2/2024) 100 each 0   • benzonatate (TESSALON PERLES) 100 mg capsule Take 1 capsule (100 mg total) by mouth 3 (three) times a day as needed for cough (Patient not taking: Reported on 5/2/2024) 20 capsule 0   • ciprofloxacin-dexamethasone (CIPRODEX) otic suspension Administer 4 drops into the left ear 2 (two) times a day (Patient not taking: Reported on 3/13/2024) 7.5 mL 0   • famotidine (Pepcid) 20 mg tablet Take 20 mg by mouth 2 (two) times a day (Patient not taking: Reported on 4/19/2024)     • Fluticasone-Salmeterol (Advair) 250-50 mcg/dose inhaler Inhale 1 puff 2 (two) times a day Rinse mouth after use. (Patient not taking: Reported on 3/13/2024) 60 blister 0   • glucagon 1 MG injection 1 mg (Patient not taking: Reported on 5/2/2024)     • guaifenesin-codeine (GUAIFENESIN AC) 100-10 MG/5ML liquid Take 5 mL by mouth 3 (three) times a day as needed for cough (Patient not taking: Reported on 3/13/2024) 150 mL 0   • hydroxychloroquine (PLAQUENIL) 200 mg tablet Take 1 tablet (200 mg total) by mouth 2 (two) times a day with meals 180 tablet 3   • Insulin Disposable Pump (Omnipod 5 G6 Pod, Gen 5,) MISC  (Patient not taking: Reported on 5/2/2024)     • insulin lispro (HumaLOG) 100 units/mL injection Inject 15 Units under the skin 3 (three) times a day before meals (Patient not taking: Reported on 2/14/2024) 10 mL 0   • Insulin Pen Needle (BD PEN NEEDLE NASIM U/F) 32G X 4 MM MISC by Does not apply route 4 (four) times a day for 180 days 400 each 3   • miconazole 2 % cream Apply 1 Application topically 2 (two) times a day To affected area (Patient not taking: Reported on 3/13/2024)     • OneTouch Verio test strip USE 4 TIMES A DAY BEFORE MEALS AND AT BEDTIME (Patient not taking: Reported on 5/2/2024)     • sodium chloride 1 g tablet Take 1 tablet (1 g total) by mouth if needed (for dizziness an lightheadendess in setting of low BP) 30 tablet  0     No current facility-administered medications for this visit.       Past Medical History  Past Medical History:   Diagnosis Date   • Abdominal pain    • Anaphylaxis    • Anxiety    • Anxiety and depression    • COVID-19 12/2020   • Delayed emergence from anesthesia 03/23/2023    Severe episode of emergence delirium (confused, combative) after MAC anesthetic on 03/2023 for EGD. Received versed 2mg, >50mcg precedex, 5mg IV haldol, and 50mcg fentanyl. Waxing and waning episodes of agitation. Any future anesthetics should NOT be done at [facility].   • Delirium, induced by drug     anesthesia   • Depression    • Diabetes mellitus (HCC)    • Disease of thyroid gland     Hashimoto   • DKA, type 1 (HCC) 05/11/2021   • Eating disorder     history of anorexia/bulemia [id number]   • Food intolerance    • Fracture of fibula     R Salter I   • Gilbert disease    • Hashimoto's disease 02/21/2020   • Head injury    • Headache(784.0)    • Nasal congestion    • PTSD (post-traumatic stress disorder)    • Rectal bleed    • Seizures (HCC)    • Type 1 diabetes (HCC)        Past Surgical History  Past Surgical History:   Procedure Laterality Date   • COLONOSCOPY     • EGD  03/23/2023   • KNEE SURGERY Right 07/06/2020   • NASAL SEPTOPLASTY W/ TURBINOPLASTY     • SINUS SURGERY     • SKIN BIOPSY     • TURBINOPLASTY N/A 03/22/2021    Procedure: TURBINOPLASTY;  Surgeon: Jn Hidalgo MD;  Location: [facility];  Service: ENT   • UPPER GASTROINTESTINAL ENDOSCOPY     • WISDOM TOOTH EXTRACTION     • WRIST SURGERY      left; Excision of ganglion       Past Social History   Social History     Socioeconomic History   • Marital status: /Civil Union     Spouse name: None   • Number of children: 0   • Years of education: None   • Highest education level: Associate degree: academic program   Occupational History   • Occupation: unemployed   Tobacco Use   • Smoking status: Never     Passive exposure: Never   • Smokeless tobacco: Never   •  Tobacco comments:     Tobacco smoke exposure (Father smokes cigars)   Vaping Use   • Vaping status: Never Used   Substance and Sexual Activity   • Alcohol use: Yes     Comment: rarely   • Drug use: Never   • Sexual activity: Yes     Partners: Male     Birth control/protection: Condom Male, OCP   Other Topics Concern   • None   Social History Narrative    Student at [employer]    Reports a poor diet; low in vegetables, high in sweets    Dental care, regularly    Lives with parents    Sleeps 8-10 hours a day        Do you have pets? Dog,cat Is pet allowed in bedroom?Yes    Are you a smoker? Never    Does anyone smoke in your home? No       Do you live with smokers? Yes    [place] frequently? No   How many times a year? N/A      Social Determinants of Health     Financial Resource Strain: Low Risk  (7/8/2023)    Received from [facility]    Overall Financial Resource Strain (CARDIA)    • Difficulty of Paying Living Expenses: Not hard at all   Food Insecurity: No Food Insecurity (1/16/2024)    Hunger Vital Sign    • Worried About Running Out of Food in the Last Year: Never true    • Ran Out of Food in the Last Year: Never true   Transportation Needs: No Transportation Needs (1/16/2024)    PRAPARE - Transportation    • Lack of Transportation (Medical): No    • Lack of Transportation (Non-Medical): No   Physical Activity: Insufficiently Active (8/7/2020)    Exercise Vital Sign    • Days of Exercise per Week: 7 days    • Minutes of Exercise per Session: 10 min   Stress: Stress Concern Present (8/7/2020)    [employer] - Occupational Stress Questionnaire    • Feeling of Stress : Rather much   Social Connections: Unknown (8/7/2020)    Social Connection and Isolation Panel [NHANES]    • Frequency of Communication with Friends and Family: More than three times a week    • Frequency of Social Gatherings with Friends and Family: Not on file    • Attends Restorationist Services: Not on file  "   • Active Member of Clubs or Organizations: No    • Attends Club or Organization Meetings: Never    • Marital Status: Never    Intimate Partner Violence: Not At Risk (7/8/2023)    Received from [facility]    Humiliation, Afraid, Rape, and Kick questionnaire    • Fear of Current or Ex-Partner: No    • Emotionally Abused: No    • Physically Abused: No    • Sexually Abused: No   Housing Stability: Unknown (1/16/2024)    Housing Stability Vital Sign    • Unable to Pay for Housing in the Last Year: No    • Number of Places Lived in the Last Year: Not on file    • Unstable Housing in the Last Year: No       Vital Signs  Vitals:    05/02/24 1036   BP: 131/85   BP Location: Left arm   Patient Position: Sitting   Cuff Size: Standard   Pulse: 99   Weight: 57.3 kg (126 lb 6.4 oz)   Height: 5' 1\" (1.549 m)         Physical Exam:  General appearance: alert, cooperative, no distress  HEENT: normocephalic, anicteric, no eye erythema or discharge, no oropharyngeal thrush  Neck: supple  Lungs: CTA b/l, no rales, rhonchi, or wheezing, unlabored respirations  Heart: RRR, no murmur, rubs, or gallops  Abdomen: soft, non-tender, non-distended, normal bowel sounds, no masses or organomegaly  Rectal: deferred  Extremities: no cyanosis, clubbing, or edema  Musculoskeletal: normal gait  Skin: color and texture normal, no jaundice, no rashes or lesions  Psychiatric: alert and oriented, normal affect and behavior                                                          Satnam Garcia PA-C    "

## 2024-05-02 NOTE — PROGRESS NOTES
[facility] - Outpatient Progress Note  Jenny Rodrigues 29 y.o. female MRN: [id number]  Encounter: [id number]    Assessment and Plan    1. Epigastric pain  - EGD March 23, 2023 only showed mild gastritis.  Gastric biopsies negative for H. pylori and gastric intestinal metaplasia.  Duodenal biopsy negative for celiac disease.  - Mesenteric doppler April 9, 2024 was normal,   - gastric emptying study April 17, 2024 was normal.    - Stool H. pylori was negative  - To consider repeat HIDA    2. Nausea and vomiting, unspecified vomiting type  -Gastric emptying study April 17, 2024 normal    3. Gastroesophageal reflux disease, unspecified whether esophagitis present  - pepcid AC as needed    4. Dysphagia  - get video swallow  - barium esophagram      --------------------------------------------------------------------------------------------------------------------    Chief Complaint: Epigastric pain, nausea vomiting, greasy stool    HPI: Jenny Rodrigues is a 29 y.o. female with history of type 1 diabetes, anxiety/depression, Hashimoto's thyroiditis, undifferentiated connective tissue disease who presents for follow-up.  Last seen in the office on February 22, 2023 for intermittent right upper quadrant abdominal pain, nausea vomiting and stearrhea     Last EGD March 23, 2023 only showed mild gastritis.  Gastric biopsies negative for H. pylori and gastric intestinal metaplasia.  Duodenal biopsy negative for celiac disease.     She had normal gastric emptying study in 2021, normal UGI series 5/2022, normal RUQ ultrasound 9/2022, normal HIDA scan 10/2022.  Previous celiac disease antibody testing negative.  Normal colonoscopy in 10/2019.  Recent CT scan on January 14, 2024 for chest and abdominal pain with IV contrast showed no acute abnormality.  She did have a fecal elastase done on March 1, 2023 which was low at 135 suggesting moderate exocrine pancreatic insufficiency.  Creon not really  helpful.   Some mild weight loss.  No diarrhea.    Interval history:  Mesenteric doppler April 9, 2024 was normal, gastric emptying study April 17, 2024 was normal.  Stool H. pylori was negative    Dysphagia  How long with symptoms - 3-4 weeks  Where does food feel like it gets stuck - high in throat  Solids same and liquids - solids worse  Need to regurgitate food - No   Pain with swallowing - No  History of seasonal or environmental allergies - No  History of Asthma - No  History of GERD - Mild  Unintentional weight loss - No   Previous esophageal imaging - No , but has video swallow ordered        Endoscopy History:  EGD - March 23, 2023 only showed mild gastritis.  Gastric biopsies negative for H. pylori and gastric intestinal metaplasia.  Duodenal biopsy negative for celiac disease.  Colonoscopy -October 2019 normal    Review of Systems:   General: negative for fatigue, fever, night sweats or unexpected weight loss  Psychological: negative for anxiety or depression  Ophthalmic: negative for blurry vision or scleral icterus  ENT: negative for headaches, sore throat or dysphagia  Hematological and Lymphatic: negative for pallor or swollen lymph nodes  Respiratory: negative for cough, shortness of breath or wheezing  Cardiovascular: negative for chest pain, edema or murmur  Gastrointestinal: as mentioned in HPI  Genito-Urinary: negative for dysuria or incontinence  Musculoskeletal: negative for joint pain, joint stiffness or joint swelling  Dermatological: negative for pruritus, rash, or jaundice    Current Medications  Current Outpatient Medications   Medication Sig Dispense Refill   • Belimumab (BENLYSTA IV) Inject into a catheter in a vein every month to absorb continually Switching to monthly on 12/5     • clindamycin (CLEOCIN T) 1 % lotion Apply 1 application. topically 2 (two) times a day     • Cyanocobalamin 1000 MCG SUBL Place 1 tablet (1,000 mcg total) under the tongue daily 90 tablet 0   • folic acid  "(FOLVITE) 1 mg tablet Take 1 tablet (1 mg total) by mouth daily 90 tablet 3   • gabapentin (Neurontin) 600 MG tablet Take 1 tablet (600 mg total) by mouth daily 90 tablet 1   • Glucagon (Gvoke HypoPen 2-Pack) 1 MG/0.2ML SOAJ 1 mg PRN for severe hypoglycemia 0.4 mL 0   • Glucagon HCl (Glucagon Emergency) 1 MG/ML SOLR      • levonorgestrel-ethinyl estradiol (NORDETTE) 0.15-30 MG-MCG per tablet Take 1 tablet by mouth daily 84 tablet 1   • levothyroxine 25 mcg tablet Take 1 tablet (25 mcg total) by mouth daily 60 tablet 0   • LORazepam (Ativan) 0.5 mg tablet Take 1 tablet (0.5 mg total) by mouth 2 (two) times a day as needed for anxiety 60 tablet 0   • metFORMIN (GLUCOPHAGE-XR) 500 mg 24 hr tablet TAKE 1 TABLET BY MOUTH EVERY DAY WITH DINNER 90 tablet 1   • methotrexate 50 MG/2ML injection Inject 0.8 mL (20 mg total) under the skin once a week 8 mL 4   • NovoLOG 100 UNIT/ML injection Up to 100 units per day with insulin pump 90 mL 3   • Tresiba FlexTouch 100 units/mL injection pen Inject 30 Units under the skin daily 15 mL 0   • BD Insulin Syringe U/F 31G X 5/16\" 0.5 ML MISC Use as directly with weekly methotrexate injection. (Patient not taking: Reported on 5/2/2024) 100 each 0   • benzonatate (TESSALON PERLES) 100 mg capsule Take 1 capsule (100 mg total) by mouth 3 (three) times a day as needed for cough (Patient not taking: Reported on 5/2/2024) 20 capsule 0   • ciprofloxacin-dexamethasone (CIPRODEX) otic suspension Administer 4 drops into the left ear 2 (two) times a day (Patient not taking: Reported on 3/13/2024) 7.5 mL 0   • famotidine (Pepcid) 20 mg tablet Take 20 mg by mouth 2 (two) times a day (Patient not taking: Reported on 4/19/2024)     • Fluticasone-Salmeterol (Advair) 250-50 mcg/dose inhaler Inhale 1 puff 2 (two) times a day Rinse mouth after use. (Patient not taking: Reported on 3/13/2024) 60 blister 0   • glucagon 1 MG injection 1 mg (Patient not taking: Reported on 5/2/2024)     • guaifenesin-codeine " (GUAIFENESIN AC) 100-10 MG/5ML liquid Take 5 mL by mouth 3 (three) times a day as needed for cough (Patient not taking: Reported on 3/13/2024) 150 mL 0   • hydroxychloroquine (PLAQUENIL) 200 mg tablet Take 1 tablet (200 mg total) by mouth 2 (two) times a day with meals 180 tablet 3   • Insulin Disposable Pump (Omnipod 5 G6 Pod, Gen 5,) MISC  (Patient not taking: Reported on 5/2/2024)     • insulin lispro (HumaLOG) 100 units/mL injection Inject 15 Units under the skin 3 (three) times a day before meals (Patient not taking: Reported on 2/14/2024) 10 mL 0   • Insulin Pen Needle (BD PEN NEEDLE NASIM U/F) 32G X 4 MM MISC by Does not apply route 4 (four) times a day for 180 days 400 each 3   • miconazole 2 % cream Apply 1 Application topically 2 (two) times a day To affected area (Patient not taking: Reported on 3/13/2024)     • OneTouch Verio test strip USE 4 TIMES A DAY BEFORE MEALS AND AT BEDTIME (Patient not taking: Reported on 5/2/2024)     • sodium chloride 1 g tablet Take 1 tablet (1 g total) by mouth if needed (for dizziness an lightheadendess in setting of low BP) 30 tablet 0     No current facility-administered medications for this visit.       Past Medical History  Past Medical History:   Diagnosis Date   • Abdominal pain    • Anaphylaxis    • Anxiety    • Anxiety and depression    • COVID-19 12/2020   • Delayed emergence from anesthesia 03/23/2023    Severe episode of emergence delirium (confused, combative) after MAC anesthetic on 03/2023 for EGD. Received versed 2mg, >50mcg precedex, 5mg IV haldol, and 50mcg fentanyl. Waxing and waning episodes of agitation. Any future anesthetics should NOT be done at [facility].   • Delirium, induced by drug     anesthesia   • Depression    • Diabetes mellitus (HCC)    • Disease of thyroid gland     Hashimoto   • DKA, type 1 (HCC) 05/11/2021   • Eating disorder     history of anorexia/bulemia [id number]   • Food intolerance    • Fracture of fibula     R Salter I   • Antione  disease    • Hashimoto's disease 02/21/2020   • Head injury    • Headache(784.0)    • Nasal congestion    • PTSD (post-traumatic stress disorder)    • Rectal bleed    • Seizures (HCC)    • Type 1 diabetes (HCC)        Past Surgical History  Past Surgical History:   Procedure Laterality Date   • COLONOSCOPY     • EGD  03/23/2023   • KNEE SURGERY Right 07/06/2020   • NASAL SEPTOPLASTY W/ TURBINOPLASTY     • SINUS SURGERY     • SKIN BIOPSY     • TURBINOPLASTY N/A 03/22/2021    Procedure: TURBINOPLASTY;  Surgeon: Jn Hidalgo MD;  Location: [facility];  Service: ENT   • UPPER GASTROINTESTINAL ENDOSCOPY     • WISDOM TOOTH EXTRACTION     • WRIST SURGERY      left; Excision of ganglion       Past Social History   Social History     Socioeconomic History   • Marital status: /Civil Union     Spouse name: None   • Number of children: 0   • Years of education: None   • Highest education level: Associate degree: academic program   Occupational History   • Occupation: unemployed   Tobacco Use   • Smoking status: Never     Passive exposure: Never   • Smokeless tobacco: Never   • Tobacco comments:     Tobacco smoke exposure (Father smokes cigars)   Vaping Use   • Vaping status: Never Used   Substance and Sexual Activity   • Alcohol use: Yes     Comment: rarely   • Drug use: Never   • Sexual activity: Yes     Partners: Male     Birth control/protection: Condom Male, OCP   Other Topics Concern   • None   Social History Narrative    Student at [employer]    Reports a poor diet; low in vegetables, high in sweets    Dental care, regularly    Lives with parents    Sleeps 8-10 hours a day        Do you have pets? Dog,cat Is pet allowed in bedroom?Yes    Are you a smoker? Never    Does anyone smoke in your home? No       Do you live with smokers? Yes    Travel [place] frequently? No   How many times a year? N/A      Social Determinants of Health     Financial Resource Strain: Low Risk  (7/8/2023)    [facility]  "Network    Overall Financial Resource Strain (CARDIA)    • Difficulty of Paying Living Expenses: Not hard at all   Food Insecurity: No Food Insecurity (1/16/2024)    Hunger Vital Sign    • Worried About Running Out of Food in the Last Year: Never true    • Ran Out of Food in the Last Year: Never true   Transportation Needs: No Transportation Needs (1/16/2024)    PRAPARE - Transportation    • Lack of Transportation (Medical): No    • Lack of Transportation (Non-Medical): No   Physical Activity: Insufficiently Active (8/7/2020)    Exercise Vital Sign    • Days of Exercise per Week: 7 days    • Minutes of Exercise per Session: 10 min   Stress: Stress Concern Present (8/7/2020)    [employer] [employer] - Occupational Stress Questionnaire    • Feeling of Stress : Rather much   Social Connections: Unknown (8/7/2020)    Social Connection and Isolation Panel [NHANES]    • Frequency of Communication with Friends and Family: More than three times a week    • Frequency of Social Gatherings with Friends and Family: Not on file    • Attends Restoration Services: Not on file    • Active Member of Clubs or Organizations: No    • Attends Club or Organization Meetings: Never    • Marital Status: Never    Intimate Partner Violence: Not At Risk (7/8/2023)    Received from [facility]    Humiliation, Afraid, Rape, and Kick questionnaire    • Fear of Current or Ex-Partner: No    • Emotionally Abused: No    • Physically Abused: No    • Sexually Abused: No   Housing Stability: Unknown (1/16/2024)    Housing Stability Vital Sign    • Unable to Pay for Housing in the Last Year: No    • Number of Places Lived in the Last Year: Not on file    • Unstable Housing in the Last Year: No       Vital Signs  Vitals:    05/02/24 1036   BP: 131/85   BP Location: Left arm   Patient Position: Sitting   Cuff Size: Standard   Pulse: 99   Weight: 57.3 kg (126 lb 6.4 oz)   Height: 5' 1\" (1.549 m)         Physical " Exam:  General appearance: alert, cooperative, no distress  HEENT: normocephalic, anicteric, no eye erythema or discharge, no oropharyngeal thrush  Neck: supple  Lungs: CTA b/l, no rales, rhonchi, or wheezing, unlabored respirations  Heart: RRR, no murmur, rubs, or gallops  Abdomen: soft, non-tender, non-distended, normal bowel sounds, no masses or organomegaly  Rectal: deferred  Extremities: no cyanosis, clubbing, or edema  Musculoskeletal: normal gait  Skin: color and texture normal, no jaundice, no rashes or lesions  Psychiatric: alert and oriented, normal affect and behavior                                                          Satnam Garcia PA-C

## 2024-05-03 ENCOUNTER — OFFICE VISIT (OUTPATIENT)
Dept: URGENT CARE | Age: 29
End: 2024-05-03
Payer: COMMERCIAL

## 2024-05-03 ENCOUNTER — HOSPITAL ENCOUNTER (OUTPATIENT)
Dept: RADIOLOGY | Facility: HOSPITAL | Age: 29
Discharge: HOME | End: 2024-05-03
Attending: PSYCHIATRY & NEUROLOGY
Payer: COMMERCIAL

## 2024-05-03 ENCOUNTER — APPOINTMENT (EMERGENCY)
Dept: RADIOLOGY | Facility: HOSPITAL | Age: 29
DRG: 639 | End: 2024-05-03
Payer: COMMERCIAL

## 2024-05-03 ENCOUNTER — HOSPITAL ENCOUNTER (INPATIENT)
Facility: HOSPITAL | Age: 29
LOS: 2 days | Discharge: HOME | DRG: 639 | End: 2024-05-05
Attending: EMERGENCY MEDICINE | Admitting: INTERNAL MEDICINE
Payer: COMMERCIAL

## 2024-05-03 VITALS
OXYGEN SATURATION: 99 % | TEMPERATURE: 97.3 F | BODY MASS INDEX: 24.43 KG/M2 | DIASTOLIC BLOOD PRESSURE: 80 MMHG | WEIGHT: 129.3 LBS | RESPIRATION RATE: 20 BRPM | HEART RATE: 104 BPM | SYSTOLIC BLOOD PRESSURE: 112 MMHG

## 2024-05-03 DIAGNOSIS — J02.9 SORE THROAT: Primary | ICD-10-CM

## 2024-05-03 DIAGNOSIS — E10.10 DIABETIC KETOACIDOSIS WITHOUT COMA ASSOCIATED WITH TYPE 1 DIABETES MELLITUS (HCC): Primary | ICD-10-CM

## 2024-05-03 DIAGNOSIS — R29.818 LHERMITTE'S SIGN POSITIVE: ICD-10-CM

## 2024-05-03 DIAGNOSIS — J06.9 UPPER RESPIRATORY TRACT INFECTION, UNSPECIFIED TYPE: ICD-10-CM

## 2024-05-03 DIAGNOSIS — R73.9 HYPERGLYCEMIA: ICD-10-CM

## 2024-05-03 DIAGNOSIS — E10.65 TYPE 1 DIABETES MELLITUS WITH HYPERGLYCEMIA (HCC): ICD-10-CM

## 2024-05-03 DIAGNOSIS — J06.9 URI (UPPER RESPIRATORY INFECTION): ICD-10-CM

## 2024-05-03 DIAGNOSIS — R20.2 PARESTHESIA: ICD-10-CM

## 2024-05-03 DIAGNOSIS — R53.83 FATIGUE: ICD-10-CM

## 2024-05-03 DIAGNOSIS — M32.9 LUPUS (HCC): ICD-10-CM

## 2024-05-03 PROBLEM — J40 BRONCHITIS: Status: RESOLVED | Noted: 2024-02-14 | Resolved: 2024-05-03

## 2024-05-03 LAB
ALBUMIN SERPL BCP-MCNC: 4.3 G/DL (ref 3.5–5)
ALP SERPL-CCNC: 91 U/L (ref 34–104)
ALT SERPL W P-5'-P-CCNC: 20 U/L (ref 7–52)
ANION GAP SERPL CALCULATED.3IONS-SCNC: 19 MMOL/L (ref 4–13)
ARTERIAL PATENCY WRIST A: YES
AST SERPL W P-5'-P-CCNC: 26 U/L (ref 13–39)
B-OH-BUTYR SERPL-MCNC: 3.74 MMOL/L (ref 0.02–0.27)
BACTERIA UR QL AUTO: ABNORMAL /HPF
BASE EX.OXY STD BLDV CALC-SCNC: 88 % (ref 60–80)
BASE EXCESS BLDV CALC-SCNC: -5.3 MMOL/L
BASOPHILS # BLD AUTO: 0.04 THOUSANDS/ÂΜL (ref 0–0.1)
BASOPHILS NFR BLD AUTO: 1 % (ref 0–1)
BILIRUB SERPL-MCNC: 0.87 MG/DL (ref 0.2–1)
BILIRUB UR QL STRIP: NEGATIVE
BUN SERPL-MCNC: 12 MG/DL (ref 5–25)
CALCIUM SERPL-MCNC: 9.5 MG/DL (ref 8.4–10.2)
CHLORIDE SERPL-SCNC: 93 MMOL/L (ref 96–108)
CLARITY UR: CLEAR
CO2 SERPL-SCNC: 19 MMOL/L (ref 21–32)
COLOR UR: COLORLESS
CREAT SERPL-MCNC: 0.79 MG/DL (ref 0.6–1.3)
EOSINOPHIL # BLD AUTO: 0.08 THOUSAND/ÂΜL (ref 0–0.61)
EOSINOPHIL NFR BLD AUTO: 1 % (ref 0–6)
ERYTHROCYTE [DISTWIDTH] IN BLOOD BY AUTOMATED COUNT: 14.5 % (ref 11.6–15.1)
EXT PREGNANCY TEST URINE: NEGATIVE
EXT. CONTROL: NORMAL
FLUAV RNA RESP QL NAA+PROBE: NEGATIVE
FLUBV RNA RESP QL NAA+PROBE: NEGATIVE
GFR SERPL CREATININE-BSD FRML MDRD: 102 ML/MIN/1.73SQ M
GLUCOSE SERPL-MCNC: 161 MG/DL (ref 65–140)
GLUCOSE SERPL-MCNC: 244 MG/DL (ref 65–140)
GLUCOSE SERPL-MCNC: 374 MG/DL (ref 65–140)
GLUCOSE SERPL-MCNC: 420 MG/DL (ref 65–140)
GLUCOSE SERPL-MCNC: 434 MG/DL (ref 65–140)
GLUCOSE SERPL-MCNC: 507 MG/DL (ref 65–140)
GLUCOSE UR STRIP-MCNC: ABNORMAL MG/DL
HCO3 BLDV-SCNC: 18.1 MMOL/L (ref 24–30)
HCT VFR BLD AUTO: 41.7 % (ref 34.8–46.1)
HGB BLD-MCNC: 13.9 G/DL (ref 11.5–15.4)
HGB UR QL STRIP.AUTO: ABNORMAL
IMM GRANULOCYTES # BLD AUTO: 0.08 THOUSAND/UL (ref 0–0.2)
IMM GRANULOCYTES NFR BLD AUTO: 1 % (ref 0–2)
KETONES UR STRIP-MCNC: ABNORMAL MG/DL
LACTATE SERPL-SCNC: 1.6 MMOL/L (ref 0.5–2)
LEUKOCYTE ESTERASE UR QL STRIP: ABNORMAL
LYMPHOCYTES # BLD AUTO: 2.74 THOUSANDS/ÂΜL (ref 0.6–4.47)
LYMPHOCYTES NFR BLD AUTO: 42 % (ref 14–44)
MCH RBC QN AUTO: 29.6 PG (ref 26.8–34.3)
MCHC RBC AUTO-ENTMCNC: 33.3 G/DL (ref 31.4–37.4)
MCV RBC AUTO: 89 FL (ref 82–98)
MONOCYTES # BLD AUTO: 0.71 THOUSAND/ÂΜL (ref 0.17–1.22)
MONOCYTES NFR BLD AUTO: 11 % (ref 4–12)
NEUTROPHILS # BLD AUTO: 2.91 THOUSANDS/ÂΜL (ref 1.85–7.62)
NEUTS SEG NFR BLD AUTO: 44 % (ref 43–75)
NITRITE UR QL STRIP: NEGATIVE
NON-SQ EPI CELLS URNS QL MICRO: ABNORMAL /HPF
NRBC BLD AUTO-RTO: 0 /100 WBCS
O2 CT BLDV-SCNC: 17.2 ML/DL
PCO2 BLDV: 29.5 MM HG (ref 42–50)
PH BLDV: 7.41 [PH] (ref 7.3–7.4)
PH UR STRIP.AUTO: 5 [PH]
PLATELET # BLD AUTO: 227 THOUSANDS/UL (ref 149–390)
PMV BLD AUTO: 11.9 FL (ref 8.9–12.7)
PO2 BLDV: 56.9 MM HG (ref 35–45)
POTASSIUM SERPL-SCNC: 4.2 MMOL/L (ref 3.5–5.3)
PROCALCITONIN SERPL-MCNC: 0.06 NG/ML
PROT SERPL-MCNC: 7.9 G/DL (ref 6.4–8.4)
PROT UR STRIP-MCNC: NEGATIVE MG/DL
RBC # BLD AUTO: 4.7 MILLION/UL (ref 3.81–5.12)
RBC #/AREA URNS AUTO: ABNORMAL /HPF
RSV RNA RESP QL NAA+PROBE: NEGATIVE
S PYO AG THROAT QL: NEGATIVE
S PYO DNA THROAT QL NAA+PROBE: NOT DETECTED
SARS-COV-2 RNA RESP QL NAA+PROBE: NEGATIVE
SODIUM SERPL-SCNC: 131 MMOL/L (ref 135–147)
SP GR UR STRIP.AUTO: 1.04 (ref 1–1.03)
TSH SERPL DL<=0.05 MIU/L-ACNC: 4.24 UIU/ML (ref 0.45–4.5)
UROBILINOGEN UR STRIP-ACNC: <2 MG/DL
WBC # BLD AUTO: 6.56 THOUSAND/UL (ref 4.31–10.16)
WBC #/AREA URNS AUTO: ABNORMAL /HPF

## 2024-05-03 PROCEDURE — 87651 STREP A DNA AMP PROBE: CPT

## 2024-05-03 PROCEDURE — G0382 LEV 3 HOSP TYPE B ED VISIT: HCPCS

## 2024-05-03 PROCEDURE — 82010 KETONE BODYS QUAN: CPT

## 2024-05-03 PROCEDURE — 81001 URINALYSIS AUTO W/SCOPE: CPT

## 2024-05-03 PROCEDURE — A9585 GADOBUTROL INJECTION: HCPCS | Performed by: PSYCHIATRY & NEUROLOGY

## 2024-05-03 PROCEDURE — 71045 X-RAY EXAM CHEST 1 VIEW: CPT

## 2024-05-03 PROCEDURE — S9083 URGENT CARE CENTER GLOBAL: HCPCS

## 2024-05-03 PROCEDURE — 99285 EMERGENCY DEPT VISIT HI MDM: CPT

## 2024-05-03 PROCEDURE — NC001 PR NO CHARGE: Performed by: INTERNAL MEDICINE

## 2024-05-03 PROCEDURE — 81025 URINE PREGNANCY TEST: CPT

## 2024-05-03 PROCEDURE — 82948 REAGENT STRIP/BLOOD GLUCOSE: CPT

## 2024-05-03 PROCEDURE — 80053 COMPREHEN METABOLIC PANEL: CPT

## 2024-05-03 PROCEDURE — 99291 CRITICAL CARE FIRST HOUR: CPT | Performed by: EMERGENCY MEDICINE

## 2024-05-03 PROCEDURE — 96365 THER/PROPH/DIAG IV INF INIT: CPT

## 2024-05-03 PROCEDURE — 84145 PROCALCITONIN (PCT): CPT

## 2024-05-03 PROCEDURE — 87147 CULTURE TYPE IMMUNOLOGIC: CPT

## 2024-05-03 PROCEDURE — 83605 ASSAY OF LACTIC ACID: CPT

## 2024-05-03 PROCEDURE — 72156 MRI NECK SPINE W/O & W/DYE: CPT

## 2024-05-03 PROCEDURE — 84443 ASSAY THYROID STIM HORMONE: CPT

## 2024-05-03 PROCEDURE — 82805 BLOOD GASES W/O2 SATURATION: CPT

## 2024-05-03 PROCEDURE — 0241U HB NFCT DS VIR RESP RNA 4 TRGT: CPT

## 2024-05-03 PROCEDURE — 36415 COLL VENOUS BLD VENIPUNCTURE: CPT

## 2024-05-03 PROCEDURE — 87070 CULTURE OTHR SPECIMN AEROBIC: CPT

## 2024-05-03 PROCEDURE — 96361 HYDRATE IV INFUSION ADD-ON: CPT

## 2024-05-03 PROCEDURE — 85025 COMPLETE CBC W/AUTO DIFF WBC: CPT

## 2024-05-03 RX ORDER — ENOXAPARIN SODIUM 100 MG/ML
40 INJECTION SUBCUTANEOUS DAILY
Status: DISCONTINUED | OUTPATIENT
Start: 2024-05-04 | End: 2024-05-05 | Stop reason: HOSPADM

## 2024-05-03 RX ORDER — CHLORHEXIDINE GLUCONATE ORAL RINSE 1.2 MG/ML
15 SOLUTION DENTAL EVERY 12 HOURS SCHEDULED
Status: DISCONTINUED | OUTPATIENT
Start: 2024-05-03 | End: 2024-05-04

## 2024-05-03 RX ORDER — DEXTROSE, SODIUM CHLORIDE, SODIUM LACTATE, POTASSIUM CHLORIDE, AND CALCIUM CHLORIDE 5; .6; .31; .03; .02 G/100ML; G/100ML; G/100ML; G/100ML; G/100ML
250 INJECTION, SOLUTION INTRAVENOUS CONTINUOUS
Status: DISCONTINUED | OUTPATIENT
Start: 2024-05-03 | End: 2024-05-04

## 2024-05-03 RX ORDER — GADOBUTROL 604.72 MG/ML
6 INJECTION INTRAVENOUS
Status: COMPLETED | OUTPATIENT
Start: 2024-05-03 | End: 2024-05-03

## 2024-05-03 RX ORDER — DIPHENHYDRAMINE HYDROCHLORIDE AND LIDOCAINE HYDROCHLORIDE AND ALUMINUM HYDROXIDE AND MAGNESIUM HYDRO
10 KIT ONCE
Status: COMPLETED | OUTPATIENT
Start: 2024-05-03 | End: 2024-05-03

## 2024-05-03 RX ADMIN — SODIUM CHLORIDE 1000 ML: 0.9 INJECTION, SOLUTION INTRAVENOUS at 21:07

## 2024-05-03 RX ADMIN — GADOBUTROL 6 ML: 604.72 INJECTION INTRAVENOUS at 14:47

## 2024-05-03 RX ADMIN — DIPHENHYDRAMINE HYDROCHLORIDE AND LIDOCAINE HYDROCHLORIDE AND ALUMINUM HYDROXIDE AND MAGNESIUM HYDRO 10 ML: KIT at 21:08

## 2024-05-03 RX ADMIN — SODIUM CHLORIDE 5.9 UNITS/HR: 9 INJECTION, SOLUTION INTRAVENOUS at 21:01

## 2024-05-03 RX ADMIN — SODIUM CHLORIDE 1000 ML: 0.9 INJECTION, SOLUTION INTRAVENOUS at 19:29

## 2024-05-03 RX ADMIN — DEXTROSE, SODIUM CHLORIDE, SODIUM LACTATE, POTASSIUM CHLORIDE, AND CALCIUM CHLORIDE 250 ML/HR: 5; .6; .31; .03; .02 INJECTION, SOLUTION INTRAVENOUS at 22:39

## 2024-05-03 NOTE — LETTER
[facility]  [address]  [place] [id number]  Dept: [phone]    May 5, 2024     Patient: Jenny Rodrigues   YOB: 1995   Date of Visit: 5/3/2024       To Whom it May Concern:    Jenny Rodrigues is under my professional care. She was seen in the hospital from 5/3/2024 to 05/05/24. She may return to work on 5/8/24 without limitations.    If you have any questions or concerns, please don't hesitate to call.         Sincerely,                Lila Lynn PA-C

## 2024-05-03 NOTE — PATIENT INSTRUCTIONS
POC glucose 507 in office, strongly recommend immediate evaluation in the ED for further evaluation. Patient understands and agrees to plan, she feels able to drive.

## 2024-05-03 NOTE — PROGRESS NOTES
[facility]        NAME: Jenny Rodrigues is a 29 y.o. female  : 1995    MRN: [id number]  DATE: May 3, 2024  TIME: 5:47 PM    Assessment and Plan   Sore throat [J02.9]  1. Sore throat  POCT rapid ANTIGEN strepA      2. Upper respiratory tract infection, unspecified type        3. Hyperglycemia  Transfer to other facility      POC glucose 507 in office, strongly recommend immediate evaluation in the ED for further evaluation. Patient understands and agrees to plan, she feels able to drive.       Patient Instructions     Diabetic Hyperglycemia   WHAT YOU NEED TO KNOW:   Diabetic hyperglycemia is a blood glucose (sugar) level that is higher than your diabetes care team provider recommends. You may have more thirst and urinate more often than usual.  DISCHARGE INSTRUCTIONS:   Call 911 for any of the following:   You have a seizure.     You begin to breathe fast or are short of breath.     You become weak and confused.     Return to the emergency department if:   Your blood sugar level is over 240 mg/dL and  you have ketones in your urine.     Your breath smells fruity.     You have nausea and are vomiting.     You have symptoms of dehydration, such as dark yellow urine, dry mouth and lips, and dry skin.     Call your diabetes care team provider if:   You continue to have higher blood sugar levels than your provider recommends.     You have questions or concerns about your condition or care.     Medicines:   Medicines , such as insulin and diabetes pills, decrease blood sugar levels.     Take your medicine as directed.  Contact your healthcare provider if you think your medicine is not helping or if you have side effects. Tell your provider if you are allergic to any medicine. Keep a list of the medicines, vitamins, and herbs you take. Include the amounts, and when and why you take them. Bring the list or the pill bottles to follow-up visits. Carry your medicine list with you in case of an emergency.      Manage diabetic hyperglycemia:   If you take diabetes medicine or insulin, take it as directed.  Missed or wrong doses can cause your blood sugar level to go up.     Tell your diabetes care team provider if you continue to have trouble managing your blood sugar level.  He or she may change the type, amount, or timing of your diabetes medicine or insulin. If you do not take diabetes medicine or insulin, you may need to start.     Work with your provider to develop a sick day plan.  Illness can cause your blood sugar to rise. A sick day plan helps you control your blood sugar level when you are sick.     Prevent diabetic hyperglycemia:   Check your blood sugar levels regularly.  Ask your diabetes care team provider how often to check your blood sugar and what your levels should be.                  Follow your meal plan.  Your blood sugar can go up if you eat a large meal or you eat more carbohydrates than recommended. Work with a dietitian to develop a meal plan that is right for you.     Exercise as directed.  Physical activity, such as exercise, can help lower your blood sugar when it is high. It can also keep your blood sugar levels steady over time. Be active for at least 30 minutes, 5 days a week. Include muscle strengthening activities 2 days each week. Do not sit for longer than 30 minutes at a time. Work with your provider to create an activity plan. Children should get at least 60 minutes of physical activity each day.          Check your ketones before exercise  if your blood sugar level is above 240 mg/dL. Do not exercise if you have ketones in your urine  because your blood sugar level may rise even more. Ask your healthcare provider how to lower your blood sugar when you have ketones.     Follow up with your diabetes care team provider as directed:  Your provider may refer you to a dietitian. Write down your questions so you remember to ask them during your visits.  © Copyright [employer] 2023  "Information is for End User's use only and may not be sold, redistributed or otherwise used for commercial purposes.  The above information is an  only. It is not intended as medical advice for individual conditions or treatments. Talk to your doctor, nurse or pharmacist before following any medical regimen to see if it is safe and effective for you.          Follow up with PCP in 3-5 days.  Proceed to  ER if symptoms worsen.    Chief Complaint     Chief Complaint   Patient presents with    Sore Throat     Started with sore throat on Monday , symptoms progressed throughout the week. L ear pain. Productive cough green mucus. No fevers reported. Taking otc Pashto medication Rompepecho also taking tylenol . Not really helping with symptoms. SOB when coughing a lot.     Nasal Congestion    Cough    Earache    Shortness of Breath         History of Present Illness       29 year old female with PMH significant for diabetes mellitus type 1, Lupus, seizures, presents for evaluation of sinus congestion, loss of voice, cough, ear pain which has been worsening over the past 4-5 days. Of note, she experienced a similar illness in January and February of this year, at which time she underwent a prolonged course of antibiotics and followed up with ENT. She does endorse episodes of hyperglycemia in the 300's-500's over the past week, and admits that she has not been wearing her glucose monitor due to other healthcare events. She notes that she has been checking her blood sugar through finger stick when she \"remembers\", and has increased her dose of Novolog due to her illness. She denies fever, polydipsia, polyuria, polyphagia. She also reports that she has been checking her urine for ketones which has been negative.     Sore Throat   Associated symptoms include coughing, ear pain and shortness of breath. Pertinent negatives include no congestion, diarrhea, ear discharge, headaches, neck pain, stridor, trouble " swallowing or vomiting.   Cough  Associated symptoms include ear pain, a sore throat and shortness of breath. Pertinent negatives include no chest pain, eye redness, fever, headaches, myalgias, postnasal drip, rash, rhinorrhea or wheezing. There is no history of environmental allergies.   Earache   Associated symptoms include coughing and a sore throat. Pertinent negatives include no diarrhea, ear discharge, headaches, neck pain, rash, rhinorrhea or vomiting.   Shortness of Breath  Associated symptoms include coughing and a sore throat. Pertinent negatives include no chest pain, dizziness, fatigue, palpitations, rhinorrhea, stridor or wheezing.       Review of Systems   Review of Systems   Constitutional:  Negative for fatigue and fever.   HENT:  Positive for ear pain, sinus pressure, sore throat and voice change. Negative for congestion, ear discharge, postnasal drip, rhinorrhea, sinus pain, sneezing, tinnitus and trouble swallowing.    Eyes: Negative.  Negative for pain, discharge, redness and itching.   Respiratory:  Positive for cough and shortness of breath. Negative for apnea, choking, chest tightness, wheezing and stridor.    Cardiovascular: Negative.  Negative for chest pain and palpitations.   Gastrointestinal: Negative.  Negative for diarrhea, nausea and vomiting.   Endocrine: Negative.  Negative for polydipsia, polyphagia and polyuria.   Genitourinary: Negative.  Negative for decreased urine volume and flank pain.   Musculoskeletal: Negative.  Negative for arthralgias, back pain, myalgias, neck pain and neck stiffness.   Skin: Negative.  Negative for color change and rash.   Allergic/Immunologic: Negative.  Negative for environmental allergies.   Neurological: Negative.  Negative for dizziness, facial asymmetry, light-headedness, numbness and headaches.   Hematological: Negative.  Negative for adenopathy.   Psychiatric/Behavioral: Negative.           Current Medications       Current Outpatient  "Medications:     BD Insulin Syringe U/F 31G X 5/16\" 0.5 ML MISC, Use as directly with weekly methotrexate injection., Disp: 100 each, Rfl: 0    Belimumab (BENLYSTA IV), Inject into a catheter in a vein every month to absorb continually Switching to monthly on 12/5, Disp: , Rfl:     clindamycin (CLEOCIN T) 1 % lotion, Apply 1 application. topically 2 (two) times a day, Disp: , Rfl:     Cyanocobalamin 1000 MCG SUBL, Place 1 tablet (1,000 mcg total) under the tongue daily, Disp: 90 tablet, Rfl: 0    Fluticasone-Salmeterol (Advair) 250-50 mcg/dose inhaler, Inhale 1 puff 2 (two) times a day Rinse mouth after use., Disp: 60 blister, Rfl: 0    folic acid (FOLVITE) 1 mg tablet, Take 1 tablet (1 mg total) by mouth daily, Disp: 90 tablet, Rfl: 3    gabapentin (Neurontin) 600 MG tablet, Take 1 tablet (600 mg total) by mouth daily, Disp: 90 tablet, Rfl: 1    Glucagon (Gvoke HypoPen 2-Pack) 1 MG/0.2ML SOAJ, 1 mg PRN for severe hypoglycemia, Disp: 0.4 mL, Rfl: 0    glucagon 1 MG injection, 1 mg, Disp: , Rfl:     Glucagon HCl (Glucagon Emergency) 1 MG/ML SOLR, , Disp: , Rfl:     hydroxychloroquine (PLAQUENIL) 200 mg tablet, Take 1 tablet (200 mg total) by mouth 2 (two) times a day with meals, Disp: 180 tablet, Rfl: 3    Insulin Pen Needle (BD PEN NEEDLE NASIM U/F) 32G X 4 MM MISC, by Does not apply route 4 (four) times a day for 180 days, Disp: 400 each, Rfl: 3    levonorgestrel-ethinyl estradiol (NORDETTE) 0.15-30 MG-MCG per tablet, Take 1 tablet by mouth daily, Disp: 84 tablet, Rfl: 1    levothyroxine 25 mcg tablet, Take 1 tablet (25 mcg total) by mouth daily, Disp: 60 tablet, Rfl: 0    LORazepam (Ativan) 0.5 mg tablet, Take 1 tablet (0.5 mg total) by mouth 2 (two) times a day as needed for anxiety, Disp: 60 tablet, Rfl: 0    metFORMIN (GLUCOPHAGE-XR) 500 mg 24 hr tablet, TAKE 1 TABLET BY MOUTH EVERY DAY WITH DINNER, Disp: 90 tablet, Rfl: 1    methotrexate 50 MG/2ML injection, Inject 0.8 mL (20 mg total) under the skin once a " week, Disp: 8 mL, Rfl: 4    NovoLOG 100 UNIT/ML injection, Up to 100 units per day with insulin pump, Disp: 90 mL, Rfl: 3    OneTouch Verio test strip, , Disp: , Rfl:     Tresiba FlexTouch 100 units/mL injection pen, Inject 30 Units under the skin daily, Disp: 15 mL, Rfl: 0    benzonatate (TESSALON PERLES) 100 mg capsule, Take 1 capsule (100 mg total) by mouth 3 (three) times a day as needed for cough (Patient not taking: Reported on 5/2/2024), Disp: 20 capsule, Rfl: 0    ciprofloxacin-dexamethasone (CIPRODEX) otic suspension, Administer 4 drops into the left ear 2 (two) times a day (Patient not taking: Reported on 3/13/2024), Disp: 7.5 mL, Rfl: 0    famotidine (Pepcid) 20 mg tablet, Take 20 mg by mouth 2 (two) times a day (Patient not taking: Reported on 5/3/2024), Disp: , Rfl:     guaifenesin-codeine (GUAIFENESIN AC) 100-10 MG/5ML liquid, Take 5 mL by mouth 3 (three) times a day as needed for cough (Patient not taking: Reported on 3/13/2024), Disp: 150 mL, Rfl: 0    Insulin Disposable Pump (Omnipod 5 G6 Pod, Gen 5,) MISC, , Disp: , Rfl:     insulin lispro (HumaLOG) 100 units/mL injection, Inject 15 Units under the skin 3 (three) times a day before meals (Patient not taking: Reported on 2/14/2024), Disp: 10 mL, Rfl: 0    miconazole 2 % cream, Apply 1 Application topically 2 (two) times a day To affected area (Patient not taking: Reported on 3/13/2024), Disp: , Rfl:     sodium chloride 1 g tablet, Take 1 tablet (1 g total) by mouth if needed (for dizziness an lightheadendess in setting of low BP), Disp: 30 tablet, Rfl: 0  No current facility-administered medications for this visit.    Current Allergies     Allergies as of 05/03/2024 - Reviewed 05/03/2024   Allergen Reaction Noted    Insulin glargine Other (See Comments) 07/02/2019            The following portions of the patient's history were reviewed and updated as appropriate: allergies, current medications, past family history, past medical history, past social  history, past surgical history and problem list.     Past Medical History:   Diagnosis Date    Abdominal pain     Anaphylaxis     Anxiety     Anxiety and depression     COVID-19 12/2020    Delayed emergence from anesthesia 03/23/2023    Severe episode of emergence delirium (confused, combative) after MAC anesthetic on 03/2023 for EGD. Received versed 2mg, >50mcg precedex, 5mg IV haldol, and 50mcg fentanyl. Waxing and waning episodes of agitation. Any future anesthetics should NOT be done at [facility].    Delirium, induced by drug     anesthesia    Depression     Diabetes mellitus (HCC)     Disease of thyroid gland     Hashimoto    DKA, type 1 (HCC) 05/11/2021    Eating disorder     history of anorexia/bulemia [id number]    Food intolerance     Fracture of fibula     R Salter I    Gilbert disease     Hashimoto's disease 02/21/2020    Head injury     Headache(784.0)     Nasal congestion     PTSD (post-traumatic stress disorder)     Rectal bleed     Seizures (HCC)     Type 1 diabetes (HCC)        Past Surgical History:   Procedure Laterality Date    COLONOSCOPY      EGD  03/23/2023    KNEE SURGERY Right 07/06/2020    NASAL SEPTOPLASTY W/ TURBINOPLASTY      SINUS SURGERY      SKIN BIOPSY      TURBINOPLASTY N/A 03/22/2021    Procedure: TURBINOPLASTY;  Surgeon: Jn Hidalgo MD;  Location: [facility];  Service: ENT    UPPER GASTROINTESTINAL ENDOSCOPY      WISDOM TOOTH EXTRACTION      WRIST SURGERY      left; Excision of ganglion       Family History   Problem Relation Age of Onset    Hypertension Mother     Migraines Mother         Headache    Diabetes type II Mother     Varicose Veins Mother     Hyperlipidemia Mother     Diabetes Mother     Arthritis Mother     Depression Mother     Hearing loss Mother     Anxiety disorder Mother     Eczema Father     Cholelithiasis Father     Hypertension Father     Sarcoidosis Father         Liver    Hyperlipidemia Father     Diabetes Father     Coronary artery disease Father      Nephrolithiasis Father     Cirrhosis Father     Alcohol abuse Father     Thyroid disease Sister     Hashimoto's thyroiditis Sister     Alcohol abuse Brother     Cancer Family     Diabetes Family     Hypertension Family          Medications have been verified.        Objective   /80   Pulse 104   Temp (!) 97.3 °F (36.3 °C) (Temporal)   Resp 20   Wt 58.7 kg (129 lb 4.8 oz)   LMP  (LMP Unknown) Comment: 2019  SpO2 99%   BMI 24.43 kg/m²        Physical Exam     Physical Exam  Vitals and nursing note reviewed.   Constitutional:       General: She is not in acute distress.     Appearance: Normal appearance. She is not ill-appearing, toxic-appearing or diaphoretic.      Interventions: She is not intubated.  HENT:      Head: Normocephalic and atraumatic.      Right Ear: Tympanic membrane and ear canal normal. No drainage, swelling or tenderness. No middle ear effusion. Tympanic membrane is not erythematous.      Left Ear: Tympanic membrane and ear canal normal. No drainage, swelling or tenderness.  No middle ear effusion. Tympanic membrane is not erythematous.      Nose: Nose normal. No congestion or rhinorrhea.      Mouth/Throat:      Mouth: Mucous membranes are moist. No oral lesions.      Pharynx: Uvula midline. No pharyngeal swelling, oropharyngeal exudate, posterior oropharyngeal erythema or uvula swelling.      Tonsils: No tonsillar exudate or tonsillar abscesses. 1+ on the right. 1+ on the left.   Eyes:      Extraocular Movements: Extraocular movements intact.      Conjunctiva/sclera: Conjunctivae normal.      Pupils: Pupils are equal, round, and reactive to light.   Cardiovascular:      Rate and Rhythm: Normal rate and regular rhythm.      Pulses: Normal pulses.      Heart sounds: Normal heart sounds, S1 normal and S2 normal. Heart sounds not distant. No murmur heard.     No friction rub. No gallop.   Pulmonary:      Effort: Pulmonary effort is normal. No tachypnea, bradypnea, accessory muscle usage,  prolonged expiration, respiratory distress or retractions. She is not intubated.      Breath sounds: Normal breath sounds. No stridor, decreased air movement or transmitted upper airway sounds. No decreased breath sounds, wheezing, rhonchi or rales.   Abdominal:      General: Bowel sounds are normal.      Palpations: Abdomen is soft.      Tenderness: There is no abdominal tenderness. There is no guarding or rebound.   Musculoskeletal:         General: Normal range of motion.      Cervical back: Normal range of motion and neck supple. No tenderness.   Skin:     General: Skin is warm and dry.      Capillary Refill: Capillary refill takes less than 2 seconds.   Neurological:      General: No focal deficit present.      Mental Status: She is alert and oriented to person, place, and time.      Cranial Nerves: No cranial nerve deficit.   Psychiatric:         Mood and Affect: Mood normal.         Behavior: Behavior normal.

## 2024-05-03 NOTE — ED PROVIDER NOTES
"History  Chief Complaint   Patient presents with    Hyperglycemia - Symptomatic     Pt was seen at urgent care for URI; while there her blood sugar was checked and was over 500. Hx type 1 diabetes. States she has been sick on and off x 1 month.       Hyperglycemia - Symptomatic  Associated symptoms: abdominal pain (unchanged for past few months) and fatigue    Pt is a 29-year-old female with past medical history of lupus, type 1 diabetes mellitus, Hashimoto's thyroiditis, anxiety, Gilbert disease, PTSD presenting to the ED with sore throat, hyperglycemia. Patient has had recurrence of sore throat earlier this year as well which she was managed by ENT with omnicef 300mg BID for 21 days. Was referred here from urgent care for  hyperglycemia, bsg >400 here. History of DKA. Multiple immunomodulators.     Prior to Admission Medications   Prescriptions Last Dose Informant Patient Reported? Taking?   BD Insulin Syringe U/F 31G X \" 0.5 ML MISC  Self No No   Sig: Use as directly with weekly methotrexate injection.   Belimumab (BENLYSTA IV)  Self Yes No   Sig: Inject into a catheter in a vein every month to absorb continually Switching to monthly on    Cyanocobalamin 1000 MCG SUBL  Self No No   Sig: Place 1 tablet (1,000 mcg total) under the tongue daily   Fluticasone-Salmeterol (Advair) 250-50 mcg/dose inhaler  Self No No   Sig: Inhale 1 puff 2 (two) times a day Rinse mouth after use.   Glucagon (Gvoke HypoPen 2-Pack) 1 MG/0.2ML SOAJ  Self No No   Si mg PRN for severe hypoglycemia   Glucagon HCl (Glucagon Emergency) 1 MG/ML SOLR  Self Yes No   Insulin Disposable Pump (Omnipod 5 G6 Pod, Gen 5,) MISC  Self Yes No   Patient not taking: Reported on 5/3/2024   Insulin Pen Needle (BD PEN NEEDLE NASIM U/F) 32G X 4 MM MISC  Self No No   Sig: by Does not apply route 4 (four) times a day for 180 days   LORazepam (Ativan) 0.5 mg tablet  Self No No   Sig: Take 1 tablet (0.5 mg total) by mouth 2 (two) times a day as needed for " anxiety   NovoLOG 100 UNIT/ML injection  Self No No   Sig: Up to 100 units per day with insulin pump   OneTouch Verio test strip  Self Yes No   Tresiba FlexTouch 100 units/mL injection pen  Self No No   Sig: Inject 30 Units under the skin daily   benzonatate (TESSALON PERLES) 100 mg capsule  Self No No   Sig: Take 1 capsule (100 mg total) by mouth 3 (three) times a day as needed for cough   Patient not taking: Reported on 2024   ciprofloxacin-dexamethasone (CIPRODEX) otic suspension  Self No No   Sig: Administer 4 drops into the left ear 2 (two) times a day   Patient not taking: Reported on 3/13/2024   clindamycin (CLEOCIN T) 1 % lotion  Self Yes No   Sig: Apply 1 application. topically 2 (two) times a day   famotidine (Pepcid) 20 mg tablet  Self Yes No   Sig: Take 20 mg by mouth 2 (two) times a day   Patient not taking: Reported on 5/3/2024   folic acid (FOLVITE) 1 mg tablet  Self No No   Sig: Take 1 tablet (1 mg total) by mouth daily   gabapentin (Neurontin) 600 MG tablet  Self No No   Sig: Take 1 tablet (600 mg total) by mouth daily   glucagon 1 MG injection  Self Yes No   Si mg   guaifenesin-codeine (GUAIFENESIN AC) 100-10 MG/5ML liquid  Self No No   Sig: Take 5 mL by mouth 3 (three) times a day as needed for cough   Patient not taking: Reported on 3/13/2024   hydroxychloroquine (PLAQUENIL) 200 mg tablet  Self No No   Sig: Take 1 tablet (200 mg total) by mouth 2 (two) times a day with meals   insulin lispro (HumaLOG) 100 units/mL injection  Self No No   Sig: Inject 15 Units under the skin 3 (three) times a day before meals   Patient not taking: Reported on 2024   levonorgestrel-ethinyl estradiol (NORDETTE) 0.15-30 MG-MCG per tablet  Self No No   Sig: Take 1 tablet by mouth daily   levothyroxine 25 mcg tablet  Self No No   Sig: Take 1 tablet (25 mcg total) by mouth daily   metFORMIN (GLUCOPHAGE-XR) 500 mg 24 hr tablet  Self No No   Sig: TAKE 1 TABLET BY MOUTH EVERY DAY WITH DINNER   methotrexate 50  MG/2ML injection  Self No No   Sig: Inject 0.8 mL (20 mg total) under the skin once a week   miconazole 2 % cream  Self Yes No   Sig: Apply 1 Application topically 2 (two) times a day To affected area   Patient not taking: Reported on 3/13/2024   sodium chloride 1 g tablet  Self No No   Sig: Take 1 tablet (1 g total) by mouth if needed (for dizziness an lightheadendess in setting of low BP)      Facility-Administered Medications: None       Past Medical History:   Diagnosis Date    Abdominal pain     Anaphylaxis     Anxiety     Anxiety and depression     COVID-19 12/2020    Delayed emergence from anesthesia 03/23/2023    Severe episode of emergence delirium (confused, combative) after MAC anesthetic on 03/2023 for EGD. Received versed 2mg, >50mcg precedex, 5mg IV haldol, and 50mcg fentanyl. Waxing and waning episodes of agitation. Any future anesthetics should NOT be done at [facility].    Delirium, induced by drug     anesthesia    Depression     Diabetes mellitus (HCC)     Disease of thyroid gland     Hashimoto    DKA, type 1 (HCC) 05/11/2021    Eating disorder     history of anorexia/bulemia 8962-5883    Food intolerance     Fracture of fibula     R Salter I    Gilbert disease     Hashimoto's disease 02/21/2020    Head injury     Headache(784.0)     Nasal congestion     PTSD (post-traumatic stress disorder)     Rectal bleed     Seizures (HCC)     Type 1 diabetes (HCC)        Past Surgical History:   Procedure Laterality Date    COLONOSCOPY      EGD  03/23/2023    KNEE SURGERY Right 07/06/2020    NASAL SEPTOPLASTY W/ TURBINOPLASTY      SINUS SURGERY      SKIN BIOPSY      TURBINOPLASTY N/A 03/22/2021    Procedure: TURBINOPLASTY;  Surgeon: Jn Hidalgo MD;  Location: [facility];  Service: ENT    UPPER GASTROINTESTINAL ENDOSCOPY      WISDOM TOOTH EXTRACTION      WRIST SURGERY      left; Excision of ganglion       Family History   Problem Relation Age of Onset    Hypertension Mother     Migraines Mother          Headache    Diabetes type II Mother     Varicose Veins Mother     Hyperlipidemia Mother     Diabetes Mother     Arthritis Mother     Depression Mother     Hearing loss Mother     Anxiety disorder Mother     Eczema Father     Cholelithiasis Father     Hypertension Father     Sarcoidosis Father         Liver    Hyperlipidemia Father     Diabetes Father     Coronary artery disease Father     Nephrolithiasis Father     Cirrhosis Father     Alcohol abuse Father     Thyroid disease Sister     Hashimoto's thyroiditis Sister     Alcohol abuse Brother     Cancer Family     Diabetes Family     Hypertension Family      I have reviewed and agree with the history as documented.    E-Cigarette/Vaping    E-Cigarette Use Never User      E-Cigarette/Vaping Substances    Nicotine No     THC No     CBD No     Flavoring No     Other No     Unknown No      Social History     Tobacco Use    Smoking status: Never     Passive exposure: Never    Smokeless tobacco: Never    Tobacco comments:     Tobacco smoke exposure (Father smokes cigars)   Vaping Use    Vaping status: Never Used   Substance Use Topics    Alcohol use: Yes     Comment: rarely    Drug use: Never        Review of Systems   Constitutional:  Positive for fatigue.   HENT:  Positive for ear pain, sinus pressure and sore throat.    Gastrointestinal:  Positive for abdominal pain (unchanged for past few months).   All other systems reviewed and are negative.      Physical Exam  ED Triage Vitals   Temperature Pulse Respirations Blood Pressure SpO2   05/03/24 1837 05/03/24 1835 05/03/24 1835 05/03/24 1835 05/03/24 1835   98.7 °F (37.1 °C) 105 20 157/96 99 %      Temp Source Heart Rate Source Patient Position - Orthostatic VS BP Location FiO2 (%)   05/03/24 1837 05/03/24 1835 -- -- --   Oral Monitor         Pain Score       --                    Orthostatic Vital Signs  Vitals:    05/03/24 1835   BP: 157/96   Pulse: 105       Physical Exam  Vitals and nursing note reviewed.    Constitutional:       General: She is in acute distress.      Appearance: She is ill-appearing. She is not toxic-appearing or diaphoretic.   HENT:      Head: Normocephalic and atraumatic.      Nose: Congestion present.      Mouth/Throat:      Mouth: Mucous membranes are moist.      Pharynx: Posterior oropharyngeal erythema present. No oropharyngeal exudate.   Eyes:      Extraocular Movements: Extraocular movements intact.      Conjunctiva/sclera: Conjunctivae normal.      Pupils: Pupils are equal, round, and reactive to light.   Cardiovascular:      Rate and Rhythm: Regular rhythm. Tachycardia present.      Pulses: Normal pulses.      Heart sounds: Normal heart sounds.   Pulmonary:      Effort: Pulmonary effort is normal. No respiratory distress.      Breath sounds: Normal breath sounds. No stridor. No wheezing, rhonchi or rales.   Chest:      Chest wall: No tenderness.   Abdominal:      General: Abdomen is flat. Bowel sounds are normal. There is no distension.      Palpations: Abdomen is soft. There is no mass.      Tenderness: There is no abdominal tenderness. There is no right CVA tenderness, left CVA tenderness, guarding or rebound.      Hernia: No hernia is present.   Musculoskeletal:         General: No swelling, tenderness, deformity or signs of injury. Normal range of motion.      Cervical back: Normal range of motion and neck supple. No rigidity or tenderness.      Right lower leg: No edema.      Left lower leg: No edema.   Skin:     General: Skin is warm and dry.      Capillary Refill: Capillary refill takes less than 2 seconds.      Coloration: Skin is not jaundiced.      Findings: No bruising or lesion.   Neurological:      General: No focal deficit present.      Mental Status: She is alert and oriented to person, place, and time.      Cranial Nerves: No cranial nerve deficit.      Sensory: No sensory deficit.      Motor: No weakness.      Coordination: Coordination normal.   Psychiatric:          Behavior: Behavior normal.         ED Medications  Medications   sodium chloride 0.9 % bolus 1,000 mL (1,000 mL Intravenous New Bag 5/3/24 2107)   insulin regular (HumuLIN R,NovoLIN R) 1 Units/mL in sodium chloride 0.9 % 100 mL infusion (5.9 Units/hr Intravenous New Bag 5/3/24 2101)   sodium chloride 0.9 % bolus 1,000 mL (0 mL Intravenous Stopped 5/3/24 2110)   diphenhydramine, lidocaine, Al/Mg hydroxide, simethicone (Magic Mouthwash) oral solution 10 mL (10 mL Swish & Swallow Given 5/3/24 2108)       Diagnostic Studies  Results Reviewed       Procedure Component Value Units Date/Time    Fingerstick Glucose (POCT) [[id number]]  (Abnormal) Collected: 05/03/24 2105    Lab Status: Final result Specimen: Blood Updated: 05/03/24 2105     POC Glucose 374 mg/dl     Urine Microscopic [[id number]]  (Abnormal) Collected: 05/03/24 1930    Lab Status: Final result Specimen: Urine, Clean Catch Updated: 05/03/24 2027     RBC, UA None Seen /hpf      WBC, UA 2-4 /hpf      Epithelial Cells Occasional /hpf      Bacteria, UA Occasional /hpf     Basic metabolic panel [[id number]]     Lab Status: No result Specimen: Blood     Magnesium [[id number]]     Lab Status: No result Specimen: Blood     Phosphorus [[id number]]     Lab Status: No result Specimen: Blood     FLU/RSV/COVID - if FLU/RSV clinically relevant [[id number]]  (Normal) Collected: 05/03/24 1925    Lab Status: Final result Specimen: Nares from Nose Updated: 05/03/24 2015     SARS-CoV-2 Negative     INFLUENZA A PCR Negative     INFLUENZA B PCR Negative     RSV PCR Negative    Narrative:      FOR PEDIATRIC PATIENTS - copy/paste COVID Guidelines URL to browser: [email or web address]    SARS-CoV-2 assay is a Nucleic Acid Amplification assay intended for the  qualitative detection of nucleic acid from SARS-CoV-2 in nasopharyngeal  swabs. Results are for the presumptive identification of SARS-CoV-2 RNA.    Positive results are  indicative of infection with SARS-CoV-2, the virus  causing COVID-19, but do not rule out bacterial infection or co-infection  with other viruses. Laboratories within the [place] and its  territories are required to report all positive results to the appropriate  public health authorities. Negative results do not preclude SARS-CoV-2  infection and should not be used as the sole basis for treatment or other  patient management decisions. Negative results must be combined with  clinical observations, patient history, and epidemiological information.  This test has not been FDA cleared or approved.    This test has been authorized by FDA under an Emergency Use Authorization  (EUA). This test is only authorized for the duration of time the  declaration that circumstances exist justifying the authorization of the  emergency use of an in vitro diagnostic tests for detection of SARS-CoV-2  virus and/or diagnosis of COVID-19 infection under section 564(b)(1) of  the Act, 21 U.S.C. 360bbb-3(b)(1), unless the authorization is terminated  or revoked sooner. The test has been validated but independent review by FDA  and CLIA is pending.    Test performed using [employer] GeneXpert: This RT-PCR assay targets N2,  a region unique to SARS-CoV-2. A conserved region in the E-gene was chosen  for pan-Sarbecovirus detection which includes SARS-CoV-2.    According to CMS-2020-01-R, this platform meets the definition of high-throughput technology.    Throat culture [[id number]]     Lab Status: No result Specimen: Throat     TSH, 3rd generation with Free T4 reflex [[id number]]  (Normal) Collected: 05/03/24 1925    Lab Status: Final result Specimen: Blood from Arm, Right Updated: 05/03/24 2008     TSH 3RD GENERATON 4.241 uIU/mL     Comprehensive metabolic panel [[id number]]  (Abnormal) Collected: 05/03/24 1925    Lab Status: Final result Specimen: Blood from Arm, Right Updated: 05/03/24 2001     Sodium 131 mmol/L      Potassium 4.2 mmol/L       Chloride 93 mmol/L      CO2 19 mmol/L      ANION GAP 19 mmol/L      BUN 12 mg/dL      Creatinine 0.79 mg/dL      Glucose 420 mg/dL      Calcium 9.5 mg/dL      AST 26 U/L      ALT 20 U/L      Alkaline Phosphatase 91 U/L      Total Protein 7.9 g/dL      Albumin 4.3 g/dL      Total Bilirubin 0.87 mg/dL      eGFR 102 ml/min/1.73sq m     Narrative:      National Kidney Disease Foundation guidelines for Chronic Kidney Disease (CKD):     Stage 1 with normal or high GFR (GFR > 90 mL/min/1.73 square meters)    Stage 2 Mild CKD (GFR = 60-89 mL/min/1.73 square meters)    Stage 3A Moderate CKD (GFR = 45-59 mL/min/1.73 square meters)    Stage 3B Moderate CKD (GFR = 30-44 mL/min/1.73 square meters)    Stage 4 Severe CKD (GFR = 15-29 mL/min/1.73 square meters)    Stage 5 End Stage CKD (GFR <15 mL/min/1.73 square meters)  Note: GFR calculation is accurate only with a steady state creatinine    Strep A PCR [[id number]]  (Normal) Collected: 05/03/24 1925    Lab Status: Final result Specimen: Throat Updated: 05/03/24 2001     STREP A PCR Not Detected    Blood gas, venous [[id number]]  (Abnormal) Collected: 05/03/24 1948    Lab Status: Final result Specimen: Blood from Arm, Right Updated: 05/03/24 1959     pH, Dariel 7.405     pCO2, Dariel 29.5 mm Hg      pO2, Dariel 56.9 mm Hg      HCO3, Dariel 18.1 mmol/L      Base Excess, Dariel -5.3 mmol/L      O2 Content, Dariel 17.2 ml/dL      O2 HGB, VENOUS 88.0 %      MICHAEL TEST Yes    Beta Hydroxybutyrate [[id number]]  (Abnormal) Collected: 05/03/24 1925    Lab Status: Final result Specimen: Blood from Arm, Right Updated: 05/03/24 1953     Beta- Hydroxybutyrate 3.74 mmol/L     UA w Reflex to Microscopic w Reflex to Culture [[id number]]  (Abnormal) Collected: 05/03/24 1930    Lab Status: Final result Specimen: Urine, Clean Catch Updated: 05/03/24 1944     Color, UA Colorless     Clarity, UA Clear     Specific Gravity, UA 1.041     pH, UA 5.0     Leukocytes, UA Elevated glucose may cause decreased leukocyte  values. See urine microscopic for UWBC result     Nitrite, UA Negative     Protein, UA Negative mg/dl      Glucose, UA >=1000 (1%) mg/dl      Ketones,  (3+) mg/dl      Urobilinogen, UA <2.0 mg/dl      Bilirubin, UA Negative     Occult Blood, UA Trace    POCT pregnancy, urine [[id number]]  (Normal) Resulted: 05/03/24 1942    Lab Status: Final result Updated: 05/03/24 1942     EXT Preg Test, Ur Negative     Control Valid    CBC and differential [[id number]] Collected: 05/03/24 1925    Lab Status: Final result Specimen: Blood from Arm, Right Updated: 05/03/24 1938     WBC 6.56 Thousand/uL      RBC 4.70 Million/uL      Hemoglobin 13.9 g/dL      Hematocrit 41.7 %      MCV 89 fL      MCH 29.6 pg      MCHC 33.3 g/dL      RDW 14.5 %      MPV 11.9 fL      Platelets 227 Thousands/uL      nRBC 0 /100 WBCs      Segmented % 44 %      Immature Grans % 1 %      Lymphocytes % 42 %      Monocytes % 11 %      Eosinophils Relative 1 %      Basophils Relative 1 %      Absolute Neutrophils 2.91 Thousands/µL      Absolute Immature Grans 0.08 Thousand/uL      Absolute Lymphocytes 2.74 Thousands/µL      Absolute Monocytes 0.71 Thousand/µL      Eosinophils Absolute 0.08 Thousand/µL      Basophils Absolute 0.04 Thousands/µL     Fingerstick Glucose (POCT) [[id number]]  (Abnormal) Collected: 05/03/24 1837    Lab Status: Final result Specimen: Blood Updated: 05/03/24 1838     POC Glucose 434 mg/dl                    XR chest 1 view portable   ED Interpretation by Rocky Hernandez DO (05/03 2130)   No acute cardiopulmonary disease.            Procedures  Procedures      ED Course  ED Course as of 05/03/24 2130   Fri May 03, 2024   2015 ANION GAP(!): 19 2122 Insulin gtt ordered, consulted critical care who will admit her to SD1 for continued management of DKA. Pt is hemodynamically stable, agreeable to plan.                                       Medical Decision Making  Differential diagnosis including but not limited to: DKA,  hyperglycemia, metabolic abnormality, dehydration, sepsis, UTI, cardiac etiology, intracranial process, medication compliance issue, strep pharyngitis, laryngitis. Doubt: bacterial tracheitis, epiglottitis.    29-year-old female with past medical history of lupus, type 1 diabetes mellitus, Hashimoto's thyroiditis, anxiety, Gilbert disease, PTSD presenting to the ED with sore throat, hyperglycemia. Patient has had recurrence of sore throat earlier this year as well which she was managed by ENT with omnicef 300mg BID for 21 days. Was referred here from urgent care for  hyperglycemia, bsg >400 here. History of DKA. Multiple immunomodulators.    ANION GAP(!): 19  Insulin gtt ordered, consulted critical care who will admit her to SD1 for continued management of DKA. Pt is hemodynamically stable, agreeable to plan.      Amount and/or Complexity of Data Reviewed  Labs: ordered. Decision-making details documented in ED Course.  Radiology: ordered.    Risk  Prescription drug management.          Disposition  Final diagnoses:   Diabetic ketoacidosis without coma associated with type 1 diabetes mellitus (HCC)   URI (upper respiratory infection)   Fatigue     Time reflects when diagnosis was documented in both MDM as applicable and the Disposition within this note       Time User Action Codes Description Comment    5/3/2024  9:25 PM Rocky Hernandez Add [E10.10] Diabetic ketoacidosis without coma associated with type 1 diabetes mellitus (HCC)     5/3/2024  9:25 PM Rocky Hernandez Add [J06.9] URI (upper respiratory infection)     5/3/2024  9:25 PM Rocky Hernandez Add [R53.83] Fatigue           ED Disposition       ED Disposition   Admit    Condition   Stable    Date/Time   Fri May 3, 2024  9:25 PM    Comment   Case was discussed with CC and the patient's admission status was agreed to be Admission Status: inpatient status to the service of Dr. Hall .               Follow-up Information    None          Patient's Medications   Discharge Prescriptions    No medications on file     No discharge procedures on file.    PDMP Review         Value Time User    PDMP Reviewed  Yes 2/22/2024  9:28 AM Madonna Christopher PA-C             ED Provider  Attending physically available and evaluated Jenny Rodrigues. I managed the patient along with the ED Attending.    Electronically Signed by           Rocky Hernandez DO  05/03/24 6912

## 2024-05-04 PROBLEM — J01.91 ACUTE RECURRENT SINUSITIS: Status: ACTIVE | Noted: 2021-02-09

## 2024-05-04 LAB
ANION GAP SERPL CALCULATED.3IONS-SCNC: 11 MMOL/L (ref 4–13)
ANION GAP SERPL CALCULATED.3IONS-SCNC: 14 MMOL/L (ref 4–13)
ANION GAP SERPL CALCULATED.3IONS-SCNC: 8 MMOL/L (ref 4–13)
ANION GAP SERPL CALCULATED.3IONS-SCNC: 8 MMOL/L (ref 4–13)
BASOPHILS # BLD AUTO: 0.05 THOUSANDS/ÂΜL (ref 0–0.1)
BASOPHILS NFR BLD AUTO: 1 % (ref 0–1)
BUN SERPL-MCNC: 3 MG/DL (ref 5–25)
BUN SERPL-MCNC: 5 MG/DL (ref 5–25)
BUN SERPL-MCNC: 7 MG/DL (ref 5–25)
BUN SERPL-MCNC: 9 MG/DL (ref 5–25)
CALCIUM SERPL-MCNC: 7.9 MG/DL (ref 8.4–10.2)
CALCIUM SERPL-MCNC: 8 MG/DL (ref 8.4–10.2)
CALCIUM SERPL-MCNC: 8.2 MG/DL (ref 8.4–10.2)
CALCIUM SERPL-MCNC: 8.3 MG/DL (ref 8.4–10.2)
CHLORIDE SERPL-SCNC: 103 MMOL/L (ref 96–108)
CHLORIDE SERPL-SCNC: 106 MMOL/L (ref 96–108)
CO2 SERPL-SCNC: 19 MMOL/L (ref 21–32)
CO2 SERPL-SCNC: 21 MMOL/L (ref 21–32)
CO2 SERPL-SCNC: 22 MMOL/L (ref 21–32)
CO2 SERPL-SCNC: 22 MMOL/L (ref 21–32)
CREAT SERPL-MCNC: 0.54 MG/DL (ref 0.6–1.3)
CREAT SERPL-MCNC: 0.56 MG/DL (ref 0.6–1.3)
CREAT SERPL-MCNC: 0.57 MG/DL (ref 0.6–1.3)
CREAT SERPL-MCNC: 0.61 MG/DL (ref 0.6–1.3)
EOSINOPHIL # BLD AUTO: 0.15 THOUSAND/ÂΜL (ref 0–0.61)
EOSINOPHIL NFR BLD AUTO: 2 % (ref 0–6)
ERYTHROCYTE [DISTWIDTH] IN BLOOD BY AUTOMATED COUNT: 14.6 % (ref 11.6–15.1)
GFR SERPL CREATININE-BSD FRML MDRD: 124 ML/MIN/1.73SQ M
GFR SERPL CREATININE-BSD FRML MDRD: 127 ML/MIN/1.73SQ M
GFR SERPL CREATININE-BSD FRML MDRD: 128 ML/MIN/1.73SQ M
GFR SERPL CREATININE-BSD FRML MDRD: 129 ML/MIN/1.73SQ M
GLUCOSE SERPL-MCNC: 128 MG/DL (ref 65–140)
GLUCOSE SERPL-MCNC: 134 MG/DL (ref 65–140)
GLUCOSE SERPL-MCNC: 138 MG/DL (ref 65–140)
GLUCOSE SERPL-MCNC: 138 MG/DL (ref 65–140)
GLUCOSE SERPL-MCNC: 147 MG/DL (ref 65–140)
GLUCOSE SERPL-MCNC: 149 MG/DL (ref 65–140)
GLUCOSE SERPL-MCNC: 155 MG/DL (ref 65–140)
GLUCOSE SERPL-MCNC: 159 MG/DL (ref 65–140)
GLUCOSE SERPL-MCNC: 161 MG/DL (ref 65–140)
GLUCOSE SERPL-MCNC: 164 MG/DL (ref 65–140)
GLUCOSE SERPL-MCNC: 171 MG/DL (ref 65–140)
GLUCOSE SERPL-MCNC: 172 MG/DL (ref 65–140)
GLUCOSE SERPL-MCNC: 177 MG/DL (ref 65–140)
GLUCOSE SERPL-MCNC: 178 MG/DL (ref 65–140)
GLUCOSE SERPL-MCNC: 204 MG/DL (ref 65–140)
GLUCOSE SERPL-MCNC: 217 MG/DL (ref 65–140)
GLUCOSE SERPL-MCNC: 218 MG/DL (ref 65–140)
GLUCOSE SERPL-MCNC: 244 MG/DL (ref 65–140)
GLUCOSE SERPL-MCNC: 247 MG/DL (ref 65–140)
GLUCOSE SERPL-MCNC: 264 MG/DL (ref 65–140)
GLUCOSE SERPL-MCNC: 288 MG/DL (ref 65–140)
GLUCOSE SERPL-MCNC: 84 MG/DL (ref 65–140)
GLUCOSE SERPL-MCNC: 85 MG/DL (ref 65–140)
GLUCOSE SERPL-MCNC: 95 MG/DL (ref 65–140)
HCT VFR BLD AUTO: 36.6 % (ref 34.8–46.1)
HGB BLD-MCNC: 12.2 G/DL (ref 11.5–15.4)
IMM GRANULOCYTES # BLD AUTO: 0.09 THOUSAND/UL (ref 0–0.2)
IMM GRANULOCYTES NFR BLD AUTO: 1 % (ref 0–2)
LYMPHOCYTES # BLD AUTO: 4.65 THOUSANDS/ÂΜL (ref 0.6–4.47)
LYMPHOCYTES NFR BLD AUTO: 59 % (ref 14–44)
MAGNESIUM SERPL-MCNC: 1.7 MG/DL (ref 1.9–2.7)
MAGNESIUM SERPL-MCNC: 2.2 MG/DL (ref 1.9–2.7)
MAGNESIUM SERPL-MCNC: 2.4 MG/DL (ref 1.9–2.7)
MCH RBC QN AUTO: 29.9 PG (ref 26.8–34.3)
MCHC RBC AUTO-ENTMCNC: 33.3 G/DL (ref 31.4–37.4)
MCV RBC AUTO: 90 FL (ref 82–98)
MONOCYTES # BLD AUTO: 0.82 THOUSAND/ÂΜL (ref 0.17–1.22)
MONOCYTES NFR BLD AUTO: 10 % (ref 4–12)
NEUTROPHILS # BLD AUTO: 2.13 THOUSANDS/ÂΜL (ref 1.85–7.62)
NEUTS SEG NFR BLD AUTO: 27 % (ref 43–75)
NRBC BLD AUTO-RTO: 0 /100 WBCS
PHOSPHATE SERPL-MCNC: 2.2 MG/DL (ref 2.7–4.5)
PHOSPHATE SERPL-MCNC: 2.6 MG/DL (ref 2.7–4.5)
PHOSPHATE SERPL-MCNC: 3.8 MG/DL (ref 2.7–4.5)
PLATELET # BLD AUTO: 185 THOUSANDS/UL (ref 149–390)
PMV BLD AUTO: 11.5 FL (ref 8.9–12.7)
POTASSIUM SERPL-SCNC: 2.9 MMOL/L (ref 3.5–5.3)
POTASSIUM SERPL-SCNC: 3.3 MMOL/L (ref 3.5–5.3)
POTASSIUM SERPL-SCNC: 3.6 MMOL/L (ref 3.5–5.3)
POTASSIUM SERPL-SCNC: 4 MMOL/L (ref 3.5–5.3)
RBC # BLD AUTO: 4.08 MILLION/UL (ref 3.81–5.12)
SODIUM SERPL-SCNC: 136 MMOL/L (ref 135–147)
SODIUM SERPL-SCNC: 138 MMOL/L (ref 135–147)
WBC # BLD AUTO: 7.89 THOUSAND/UL (ref 4.31–10.16)

## 2024-05-04 PROCEDURE — 84100 ASSAY OF PHOSPHORUS: CPT | Performed by: INTERNAL MEDICINE

## 2024-05-04 PROCEDURE — 84100 ASSAY OF PHOSPHORUS: CPT

## 2024-05-04 PROCEDURE — 80048 BASIC METABOLIC PNL TOTAL CA: CPT

## 2024-05-04 PROCEDURE — NC001 PR NO CHARGE: Performed by: INTERNAL MEDICINE

## 2024-05-04 PROCEDURE — 36415 COLL VENOUS BLD VENIPUNCTURE: CPT

## 2024-05-04 PROCEDURE — 99232 SBSQ HOSP IP/OBS MODERATE 35: CPT | Performed by: INTERNAL MEDICINE

## 2024-05-04 PROCEDURE — 83735 ASSAY OF MAGNESIUM: CPT | Performed by: INTERNAL MEDICINE

## 2024-05-04 PROCEDURE — 82948 REAGENT STRIP/BLOOD GLUCOSE: CPT

## 2024-05-04 PROCEDURE — 83735 ASSAY OF MAGNESIUM: CPT

## 2024-05-04 PROCEDURE — 85025 COMPLETE CBC W/AUTO DIFF WBC: CPT

## 2024-05-04 PROCEDURE — 99254 IP/OBS CNSLTJ NEW/EST MOD 60: CPT | Performed by: INTERNAL MEDICINE

## 2024-05-04 PROCEDURE — 80048 BASIC METABOLIC PNL TOTAL CA: CPT | Performed by: INTERNAL MEDICINE

## 2024-05-04 RX ORDER — ONDANSETRON 2 MG/ML
4 INJECTION INTRAMUSCULAR; INTRAVENOUS EVERY 6 HOURS PRN
Status: DISCONTINUED | OUTPATIENT
Start: 2024-05-04 | End: 2024-05-05 | Stop reason: HOSPADM

## 2024-05-04 RX ORDER — BENZONATATE 100 MG/1
100 CAPSULE ORAL 3 TIMES DAILY PRN
Status: DISCONTINUED | OUTPATIENT
Start: 2024-05-04 | End: 2024-05-04

## 2024-05-04 RX ORDER — HYDROXYCHLOROQUINE SULFATE 200 MG/1
200 TABLET, FILM COATED ORAL 2 TIMES DAILY WITH MEALS
Status: DISCONTINUED | OUTPATIENT
Start: 2024-05-04 | End: 2024-05-05 | Stop reason: HOSPADM

## 2024-05-04 RX ORDER — POTASSIUM CHLORIDE 14.9 MG/ML
20 INJECTION INTRAVENOUS ONCE
Status: COMPLETED | OUTPATIENT
Start: 2024-05-04 | End: 2024-05-04

## 2024-05-04 RX ORDER — CEFDINIR 300 MG/1
300 CAPSULE ORAL EVERY 12 HOURS SCHEDULED
Status: DISCONTINUED | OUTPATIENT
Start: 2024-05-04 | End: 2024-05-05 | Stop reason: HOSPADM

## 2024-05-04 RX ORDER — DEXTROSE AND SODIUM CHLORIDE 5; .9 G/100ML; G/100ML
125 INJECTION, SOLUTION INTRAVENOUS CONTINUOUS
Status: DISCONTINUED | OUTPATIENT
Start: 2024-05-04 | End: 2024-05-04

## 2024-05-04 RX ORDER — ACETAMINOPHEN 325 MG/1
975 TABLET ORAL EVERY 8 HOURS PRN
Status: DISCONTINUED | OUTPATIENT
Start: 2024-05-04 | End: 2024-05-05 | Stop reason: HOSPADM

## 2024-05-04 RX ORDER — POTASSIUM CHLORIDE 20 MEQ/1
20 TABLET, EXTENDED RELEASE ORAL ONCE
Status: CANCELLED | OUTPATIENT
Start: 2024-05-04 | End: 2024-05-04

## 2024-05-04 RX ORDER — POTASSIUM CHLORIDE 20 MEQ/1
20 TABLET, EXTENDED RELEASE ORAL ONCE
Status: COMPLETED | OUTPATIENT
Start: 2024-05-04 | End: 2024-05-04

## 2024-05-04 RX ORDER — POTASSIUM CHLORIDE 14.9 MG/ML
20 INJECTION INTRAVENOUS
Status: DISCONTINUED | OUTPATIENT
Start: 2024-05-04 | End: 2024-05-04

## 2024-05-04 RX ORDER — MAGNESIUM SULFATE HEPTAHYDRATE 40 MG/ML
4 INJECTION, SOLUTION INTRAVENOUS ONCE
Status: COMPLETED | OUTPATIENT
Start: 2024-05-04 | End: 2024-05-04

## 2024-05-04 RX ORDER — POTASSIUM CHLORIDE 20 MEQ/1
40 TABLET, EXTENDED RELEASE ORAL ONCE
Status: COMPLETED | OUTPATIENT
Start: 2024-05-04 | End: 2024-05-04

## 2024-05-04 RX ORDER — POTASSIUM CHLORIDE 20 MEQ/1
40 TABLET, EXTENDED RELEASE ORAL ONCE
Status: CANCELLED | OUTPATIENT
Start: 2024-05-04 | End: 2024-05-04

## 2024-05-04 RX ORDER — FLUTICASONE FUROATE AND VILANTEROL 200; 25 UG/1; UG/1
1 POWDER RESPIRATORY (INHALATION) DAILY
Status: DISCONTINUED | OUTPATIENT
Start: 2024-05-04 | End: 2024-05-05 | Stop reason: HOSPADM

## 2024-05-04 RX ORDER — FLUTICASONE FUROATE AND VILANTEROL 100; 25 UG/1; UG/1
2 POWDER RESPIRATORY (INHALATION) 2 TIMES DAILY
Status: DISCONTINUED | OUTPATIENT
Start: 2024-05-04 | End: 2024-05-04 | Stop reason: DRUGHIGH

## 2024-05-04 RX ORDER — LEVOTHYROXINE SODIUM 0.03 MG/1
25 TABLET ORAL
Status: DISCONTINUED | OUTPATIENT
Start: 2024-05-04 | End: 2024-05-05 | Stop reason: HOSPADM

## 2024-05-04 RX ORDER — POTASSIUM CHLORIDE 29.8 MG/ML
40 INJECTION INTRAVENOUS ONCE
Status: DISCONTINUED | OUTPATIENT
Start: 2024-05-04 | End: 2024-05-04

## 2024-05-04 RX ORDER — GABAPENTIN 300 MG/1
600 CAPSULE ORAL DAILY
Status: DISCONTINUED | OUTPATIENT
Start: 2024-05-04 | End: 2024-05-05 | Stop reason: HOSPADM

## 2024-05-04 RX ORDER — FOLIC ACID 1 MG/1
1 TABLET ORAL DAILY
Status: DISCONTINUED | OUTPATIENT
Start: 2024-05-04 | End: 2024-05-05 | Stop reason: HOSPADM

## 2024-05-04 RX ORDER — POTASSIUM CHLORIDE 14.9 MG/ML
20 INJECTION INTRAVENOUS
Status: DISPENSED | OUTPATIENT
Start: 2024-05-04 | End: 2024-05-04

## 2024-05-04 RX ADMIN — POTASSIUM CHLORIDE 40 MEQ: 1500 TABLET, EXTENDED RELEASE ORAL at 05:18

## 2024-05-04 RX ADMIN — SODIUM PHOSPHATE, MONOBASIC, MONOHYDRATE AND SODIUM PHOSPHATE, DIBASIC, ANHYDROUS 12 MMOL: 142; 276 INJECTION, SOLUTION INTRAVENOUS at 02:22

## 2024-05-04 RX ADMIN — ACETAMINOPHEN 975 MG: 325 TABLET, FILM COATED ORAL at 00:54

## 2024-05-04 RX ADMIN — MAGNESIUM SULFATE 4 G: 4 INJECTION INTRAVENOUS at 01:55

## 2024-05-04 RX ADMIN — POTASSIUM CHLORIDE 20 MEQ: 1500 TABLET, EXTENDED RELEASE ORAL at 03:52

## 2024-05-04 RX ADMIN — HYDROXYCHLOROQUINE SULFATE 200 MG: 200 TABLET, FILM COATED ORAL at 20:02

## 2024-05-04 RX ADMIN — ACETAMINOPHEN 975 MG: 325 TABLET, FILM COATED ORAL at 18:06

## 2024-05-04 RX ADMIN — CEFDINIR 300 MG: 300 CAPSULE ORAL at 20:02

## 2024-05-04 RX ADMIN — DIBASIC SODIUM PHOSPHATE, MONOBASIC POTASSIUM PHOSPHATE AND MONOBASIC SODIUM PHOSPHATE 2 TABLET: 852; 155; 130 TABLET ORAL at 02:28

## 2024-05-04 RX ADMIN — FOLIC ACID 1 MG: 1 TABLET ORAL at 20:02

## 2024-05-04 RX ADMIN — ENOXAPARIN SODIUM 40 MG: 40 INJECTION SUBCUTANEOUS at 09:44

## 2024-05-04 RX ADMIN — POTASSIUM CHLORIDE 20 MEQ: 14.9 INJECTION, SOLUTION INTRAVENOUS at 05:19

## 2024-05-04 RX ADMIN — DEXTROSE, SODIUM CHLORIDE, SODIUM LACTATE, POTASSIUM CHLORIDE, AND CALCIUM CHLORIDE 250 ML/HR: 5; .6; .31; .03; .02 INJECTION, SOLUTION INTRAVENOUS at 02:31

## 2024-05-04 RX ADMIN — POTASSIUM CHLORIDE 40 MEQ: 1500 TABLET, EXTENDED RELEASE ORAL at 01:54

## 2024-05-04 RX ADMIN — BENZONATATE 100 MG: 100 CAPSULE ORAL at 00:54

## 2024-05-04 RX ADMIN — POTASSIUM CHLORIDE 20 MEQ: 14.9 INJECTION, SOLUTION INTRAVENOUS at 10:19

## 2024-05-04 RX ADMIN — GABAPENTIN 600 MG: 300 CAPSULE ORAL at 20:03

## 2024-05-04 RX ADMIN — CEFDINIR 300 MG: 300 CAPSULE ORAL at 01:07

## 2024-05-04 RX ADMIN — LEVOTHYROXINE SODIUM 25 MCG: 25 TABLET ORAL at 05:18

## 2024-05-04 RX ADMIN — CEFDINIR 300 MG: 300 CAPSULE ORAL at 12:52

## 2024-05-04 NOTE — UTILIZATION REVIEW
Initial Clinical Review    Admission: Date/Time/Statement:   Admission Orders (From admission, onward)       Ordered        05/03/24 2128  INPATIENT ADMISSION  Once                          Orders Placed This Encounter   Procedures    INPATIENT ADMISSION     Standing Status:   Standing     Number of Occurrences:   1     Order Specific Question:   Level of Care     Answer:   Level 1 Stepdown [13]     Order Specific Question:   Estimated length of stay     Answer:   More than 2 Midnights     Order Specific Question:   Certification     Answer:   I certify that inpatient services are medically necessary for this patient for a duration of greater than two midnights. See H&P and MD Progress Notes for additional information about the patient's course of treatment.     ED Arrival Information       Expected   5/3/2024     Arrival   5/3/2024 18:26    Acuity   Emergent              Means of arrival   Walk-In    Escorted by   Self    Service   Hospitalist    Admission type   Emergency              Arrival complaint   hyperglycemia             Chief Complaint   Patient presents with    Hyperglycemia - Symptomatic     Pt was seen at urgent care for URI; while there her blood sugar was checked and was over 500. Hx type 1 diabetes. States she has been sick on and off x 1 month.       Initial Presentation: 29 y.o. female  to ED, per Urgent Care,  via walk in from home.    Admitted to inpatient with Dx: DKA with type 1 DM/acute recurrent sinusitis.  Presented to ED with  elevated glucose of > 500 at urgent care just prior to arrival.   Has not felt well intermittently for last month.  This week with URI and intermittent compliance with insulin, glucose 300s - 600s last 5 days.   Poor intake.    Today has sore throat, ear pain and sinus pressure.  + fatigue.    Abdominal pain for last few months.   In March sinusitis and treated with for 21 days.   PMHx: T1DM, Lupus, hypothyroidism. On exam: acute distress.  Nasal congestion.    Posterior oropharyngeal erythema.  tachycardia.  Na 131.   CO2 19.  Anion gap 19.   Glucose 420.   Ph 7.405.  HCO3 18.1.   beta hydroxybutyrate 3.74.    Imaging shows  no acute cardio pulmonary disease on CxR. ED treatment:  2 liters of IVF, started on insulin gtt.    Plan includes:  IVF resuscitation.  Continue insulin gtt.  BMP, mag, phos q4h.   Replete electrolytes as needed.  Start cefdinir.  Throat culture.   Continue home methotrexate     Anticipated Length of Stay/Certification Statement:  Anticipated Length of Stay is > 2 midnights     Date: 5/4/24    Day 2:  today gap closed.   Complaints of discomfort with IV potassium.   Exam non focal. Repeat BMP at 12:48 PM today shows serum bicarbonate 22 with anion gap of 8.   IVF dc.  Insulin gtt to non DKA.  Consult endocrine.  Continue Cefdinir    5/4/24 per endocrine - DKA resolved.  Has type 1 DM on multidose basal/bolus insulin regimen and has had poor intake.  Will transition to insulin pump as OP,   Continue IV insulin as blood sugars are followed as she is advanced on her diet to see if she tolerates p.o. Transition to subcu insulin if tolerating p.o.      Patient has crossed 3 midnights and requires ongoing care    5/5/2024 .  Patient presents with very hoarse voice.  Able to tolerate diet.    On exam  hoarse voice.   Abnormal labs or imaging:  glucose 249>184>292>280>205>175  Diagnosis/Plan     DKA with type 1 DM/Odynophagia.    Transition off insulin drip.  Patient wishes to bring him her own Tresiba from home. 2 hours after receiving that dose, the insulin drip can be discontinued and we will also put her on 8 units of mealtime insulin plus algorithm to sliding scale coverage per endocrine.  Continue cefdinir.        ED Triage Vitals   Temperature Pulse Respirations Blood Pressure SpO2   05/03/24 1837 05/03/24 1835 05/03/24 1835 05/03/24 1835 05/03/24 1835   98.7 °F (37.1 °C) 105 20 157/96 99 %      Temp Source Heart Rate Source Patient Position -  Orthostatic VS BP Location FiO2 (%)   05/03/24 1837 05/03/24 1835 05/03/24 2148 05/03/24 2148 --   Oral Monitor Sitting Right arm       Pain Score       05/04/24 0054       5          Wt Readings from Last 1 Encounters:   05/04/24 58.3 kg (128 lb 8.5 oz)     Additional Vital Signs:   5/05/24 0804 98.4 °F (36.9 °C) -- -- 98/53 73 -- None (Room air) Lying   05/04/24 2207 98.1 °F (36.7 °C) 90 18 110/76 -- 98 % -- --   05/04/24 2000 -- -- -- -- -- -- Nasal cannula --   05/04/24 1900 98.5 °F (36.9 °C) -- 18 100/68 80 --       5/04/24 0000 97.7 °F (36.5 °C) 91 18 113/78 91 98 % None (Room air) Sitting   05/03/24 2330 -- 96 18 113/83 94 97 % None (Room air) Sitting   05/03/24 2300 -- 93 18 107/77 88 98 % None (Room air) Sitting   05/03/24 2230 -- 88 18 113/83 95 98 % None (Room air) Sitting   05/03/24 2148 -- 88 20 123/81 96 99 % None (Room air) Sitting     Pertinent Labs/Diagnostic Test Results:   XR chest 1 view portable   ED Interpretation by Rocky Hernandez DO (05/03 2130)   No acute cardiopulmonary disease.      Final Result by Cecelia Gary MD (05/04 0519)      No acute cardiopulmonary disease.            Workstation performed: [id number]           Results from last 7 days   Lab Units 05/03/24 1925   SARS-COV-2  Negative     Results from last 7 days   Lab Units 05/04/24  0639 05/03/24 1925   WBC Thousand/uL 7.89 6.56   HEMOGLOBIN g/dL 12.2 13.9   HEMATOCRIT % 36.6 41.7   PLATELETS Thousands/uL 185 227   TOTAL NEUT ABS Thousands/µL 2.13 2.91     Results from last 7 days   Lab Units 05/05/24  0415 05/04/24  1248 05/04/24  0832 05/04/24  0639 05/04/24  0420 05/04/24  0032   SODIUM mmol/L 136 136 136  --  136 138   POTASSIUM mmol/L 3.7 4.0 3.6  --  3.3* 2.9*   CHLORIDE mmol/L 103 106 106  --  103 106   CO2 mmol/L 21 22 22  --  19* 21   ANION GAP mmol/L 12 8 8  --  14* 11   BUN mg/dL 6 3* 5  --  7 9   CREATININE mg/dL 0.57* 0.54* 0.57*  --  0.56* 0.61   EGFR ml/min/1.73sq m 127 129 127  --  128 124    CALCIUM mg/dL 8.4 8.3* 8.0*  --  7.9* 8.2*   MAGNESIUM mg/dL 1.9  --  2.2 2.4  --  1.7*   PHOSPHORUS mg/dL 4.2  --  2.6* 3.8  --  2.2*     Results from last 7 days   Lab Units 05/03/24 1925   AST U/L 26   ALT U/L 20   ALK PHOS U/L 91   TOTAL PROTEIN g/dL 7.9   ALBUMIN g/dL 4.3   TOTAL BILIRUBIN mg/dL 0.87     Results from last 7 days   Lab Units 05/05/24  1429 05/05/24  1354 05/05/24  1258 05/05/24  1133 05/05/24  1000 05/05/24  0807 05/05/24  0603 05/05/24  0406 05/05/24  0148 05/04/24  2342 05/04/24  2302 05/04/24  2155   POC GLUCOSE mg/dl 175* 205* 280* 292* 184* 249* 120 283* 93 288* 244* 85     Results from last 7 days   Lab Units 05/05/24  0415 05/04/24  1248 05/04/24  0832 05/04/24  0420 05/04/24  0032 05/03/24  1925   GLUCOSE RANDOM mg/dL 301* 149* 171* 204* 161* 420*     Beta- Hydroxybutyrate   Date Value Ref Range Status   05/03/2024 3.74 (H) 0.02 - 0.27 mmol/L Final     BETA-HYDROXYBUTYRATE   Date Value Ref Range Status   01/14/2024 5.3 (H) <0.6 mmol/L Final   09/26/2022 5.1 (H) <0.6 mmol/L Final   10/22/2019 0.4 <0.6 mmol/L Final   09/22/2019 0.1 <0.6 mmol/L Final      Results from last 7 days   Lab Units 05/03/24  1948   PH ISADORA  7.405*   PCO2 ISADORA mm Hg 29.5*   PO2 ISADORA mm Hg 56.9*   HCO3 ISADORA mmol/L 18.1*   BASE EXC ISADORA mmol/L -5.3   O2 CONTENT ISADORA ml/dL 17.2   O2 HGB, VENOUS % 88.0*     Results from last 7 days   Lab Units 05/03/24 1925   TSH 3RD GENERATON uIU/mL 4.241     Results from last 7 days   Lab Units 05/03/24  1925   PROCALCITONIN ng/ml 0.06     Results from last 7 days   Lab Units 05/03/24  2146   LACTIC ACID mmol/L 1.6     Results from last 7 days   Lab Units 05/03/24  1930   CLARITY UA  Clear   COLOR UA  Colorless   SPEC GRAV UA  1.041*   PH UA  5.0   GLUCOSE UA mg/dl >=1000 (1%)*   KETONES UA mg/dl 100 (3+)*   BLOOD UA  Trace*   PROTEIN UA mg/dl Negative   NITRITE UA  Negative   BILIRUBIN UA  Negative   UROBILINOGEN UA (BE) mg/dl <2.0   LEUKOCYTES UA  Elevated glucose may cause decreased  leukocyte values. See urine microscopic for UWBC result*   WBC UA /hpf 2-4*   RBC UA /hpf None Seen   BACTERIA UA /hpf Occasional   EPITHELIAL CELLS WET PREP /hpf Occasional     Results from last 7 days   Lab Units 05/03/24 1925   INFLUENZA A PCR  Negative   INFLUENZA B PCR  Negative   RSV PCR  Negative       ED Treatment:   Medication Administration from 05/03/2024 1753 to 05/04/2024 0047         Date/Time Order Dose Route Action Comments     05/03/2024 1929 EDT sodium chloride 0.9 % bolus 1,000 mL 1,000 mL Intravenous New Bag --     05/03/2024 2108 EDT diphenhydramine, lidocaine, Al/Mg hydroxide, simethicone (Magic Mouthwash) oral solution 10 mL 10 mL Swish & Swallow Given --     05/03/2024 2107 EDT sodium chloride 0.9 % bolus 1,000 mL 1,000 mL Intravenous New Bag --     05/03/2024 2201 EDT insulin regular (HumuLIN R,NovoLIN R) 1 Units/mL in sodium chloride 0.9 % 100 mL infusion 2.95 Units/hr Intravenous Rate/Dose Change --     05/03/2024 2101 EDT insulin regular (HumuLIN R,NovoLIN R) 1 Units/mL in sodium chloride 0.9 % 100 mL infusion 5.9 Units/hr Intravenous New Bag --     05/03/2024 2239 EDT dextrose 5 % in lactated Ringer's infusion 250 mL/hr Intravenous New Bag --          Past Medical History:   Diagnosis Date    Abdominal pain     Anaphylaxis     Anxiety     Anxiety and depression     COVID-19 12/2020    Delayed emergence from anesthesia 03/23/2023    Severe episode of emergence delirium (confused, combative) after MAC anesthetic on 03/2023 for EGD. Received versed 2mg, >50mcg precedex, 5mg IV haldol, and 50mcg fentanyl. Waxing and waning episodes of agitation. Any future anesthetics should NOT be done at [facility].    Delirium, induced by drug     anesthesia    Depression     Diabetes mellitus (HCC)     Disease of thyroid gland     Hashimoto    DKA, type 1 (HCC) 05/11/2021    Eating disorder     history of anorexia/bulemia 6321-6189    Food intolerance     Fracture of fibula     R Salter I    Gilbert disease      Hashimoto's disease 02/21/2020    Head injury     Headache(784.0)     Nasal congestion     PTSD (post-traumatic stress disorder)     Rectal bleed     Seizures (HCC)     Type 1 diabetes (HCC)      Present on Admission:   Diabetic ketoacidosis associated with type 1 diabetes mellitus (HCC)   Hypothyroidism   Lupus (HCC)   Odynophagia      Admitting Diagnosis: Fatigue [R53.83]  Hyperglycemia [R73.9]  URI (upper respiratory infection) [J06.9]  Diabetic ketoacidosis without coma associated with type 1 diabetes mellitus (HCC) [E10.10]  Age/Sex: 29 y.o. female  Admission Orders:  5/3/24 2124 inpatient   Scheduled Medications:  cefdinir, 300 mg, Oral, Q12H ROQUE  enoxaparin, 40 mg, Subcutaneous, Daily  fluticasone-vilanterol, 1 puff, Inhalation, Daily  folic acid, 1 mg, Oral, Daily  gabapentin, 600 mg, Oral, Daily  hydroxychloroquine, 200 mg, Oral, BID With Meals  levothyroxine, 25 mcg, Oral, Early Morning    magnesium sulfate 4 g/100 mL IVPB (premix) 4 g  Dose: 4 g  Freq: Once Route: IV  Last Dose: Stopped (05/04/24 0653)  Start: 05/04/24 0145 End: 05/04/24 0653    potassium chloride (Klor-Con M20) CR tablet 40 mEq  Dose: 40 mEq  Freq: Once Route: PO  Start: 05/04/24 0145 End: 05/04/24 0154   potassium chloride (Klor-Con M20) CR tablet 40 mEq  Dose: 40 mEq  Freq: Once Route: PO  Start: 05/04/24 0500 End: 05/04/24 0518   potassium chloride 20 mEq IVPB (premix)  Dose: 20 mEq  Freq: Once Route: IV  Last Dose: Stopped (05/04/24 1256)  Start: 05/04/24 1000 End: 05/04/24 1256   potassium chloride 20 mEq IVPB (premix)  Dose: 20 mEq  Freq: Every 1 hour scheduled Route: IV  Last Dose: Stopped (05/04/24 1003)  Start: 05/04/24 0515 End: 05/04/24 0659   sodium phosphate 12 mmol in sodium chloride 0.9 % 100 mL Infusion  Dose: 12 mmol  Freq: Once Route: IV  Last Dose: Stopped (05/04/24 0653)  Start: 05/04/24 0145 End: 05/04/24 0653   Followed by  potassium-sodium phosphateS (K-PHOS,PHOSPHA 250) -250 mg tablet 2 tablet  Dose: 2  tablet  Freq: Once Route: PO  Start: 05/04/24 0145 End: 05/04/24 0228       Continuous IV Infusions:  insulin regular (HumuLIN R,NovoLIN R) 1 Units/mL in sodium chloride 0.9 % 100 mL infusion, 0.3-21 Units/hr, Intravenous, Titrated    dextrose 5 % and sodium chloride 0.9 % infusion  Rate: 125 mL/hr Dose: 125 mL/hr  Freq: Continuous Route: IV  Indications of Use: IV Hydration  Last Dose: Stopped (05/04/24 1337)  Start: 05/04/24 1145 End: 05/04/24 1332   dextrose 5 % in lactated Ringer's infusion  Rate: 250 mL/hr Dose: 250 mL/hr  Freq: Continuous Route: IV  Last Dose: Stopped (05/04/24 1238)  Start: 05/03/24 2215 End: 05/04/24 1332   insulin regular (HumuLIN R,NovoLIN R) 1 Units/mL in sodium chloride 0.9 % 100 mL infusion  Rate: 0.1-30 mL/hr Dose: 0.1-30 Units/hr  Freq: Continuous Route: IV  Last Dose: Stopped (05/04/24 1337)  Start: 05/03/24 2030 End: 05/04/24 1334       PRN Meds:  acetaminophen, 975 mg, Oral, Q8H PRN x 1 5/4  ondansetron, 4 mg, Intravenous, Q6H PRN    benzonatate (TESSALON PERLES) capsule 100 mg x 1 5/4  Dose: 100 mg  Freq: 3 times daily PRN Route: PO  PRN Reason: cough  Start: 05/04/24 0029 End: 05/04/24 0109       Neuro checks every 4 hours      IP CONSULT TO ENDOCRINOLOGY    Network Utilization Review Department  ATTENTION: Please call with any questions or concerns to [phone] and carefully listen to the prompts so that you are directed to the right person. All voicemails are confidential.   For Discharge needs, contact Care Management DC Support Team at [phone] opt. 2  Send all requests for admission clinical reviews, approved or denied determinations and any other requests to dedicated fax number below belonging to the campus where the patient is receiving treatment. List of dedicated fax numbers for the Facilities:  FACILITY NAME UR FAX NUMBER   ADMISSION DENIALS (Administrative/Medical Necessity) [phone]   DISCHARGE SUPPORT TEAM (NETWORK) [phone]   PARENT CHILD HEALTH  (Maternity/NICU/Pediatrics) [phone]   [facility] [phone]   [facility] [phone]   [facility] [phone]   [facility] [phone]   Critical access hospital [phone]   [facility] [phone]   [facility] [phone]   [facility] [phone]   [facility] [phone]   [facility] [phone]   [facility] [phone]   [facility] [phone]

## 2024-05-04 NOTE — ASSESSMENT & PLAN NOTE
Lab Results   Component Value Date    HGBA1C 9.6 (H) 01/14/2024       Recent Labs     05/04/24  0459 05/04/24  0602 05/04/24  0753 05/04/24  0934   POCGLU 264* 247* 164* 95       Blood Sugar Average: Last 72 hrs:  (P) 217    Pt has a hx of T1DM, and reports intermittent compliance with insulin this week, due to URI illness.   She reports blood sugars in the 300s to 600s over the past 5 days.   She was sent to ED for BG of 400  VBG 7.4/29.5/56.9/18    Plan  DKA protocol initiated  BMP, mag, phos q4h  Will replete electrolytes as needed

## 2024-05-04 NOTE — PROGRESS NOTES
"[facility]  Interval Progress Note: Critical Care  Name: Jenny Rodrigues I  MRN: [id number]  Unit/Bed#: ICU 07 I Date of Admission: 5/3/2024   Date of Service: 5/4/2024 I Hospital Day: 1    Code Status: Level 1 - Full Code  POA:    POLST:      Reason for ICU admission: DKA    Active problems:   Active Problems:    Diabetic ketoacidosis associated with type 1 diabetes mellitus (HCC)    Acute recurrent sinusitis    Hypothyroidism    Lupus (HCC)  Resolved Problems:    * No resolved hospital problems. *      Consultants:   - Endocrinology    History of Present Illness:  \"Jenny Rodrigues is a 29 y.o. who presents with DKA. She has a hx of T1DM, Lupus, hypothyroidism, and presented to the ED for a sore throat and hyperglycemia. She reports sore throat, headache, sinus pressure, fatigue, productive cough that started 5 days ago, and worsened 3 days ago. She had a similar episode in March 2024, was diagnosed with subacute sinusitis, and started on cefdinir for 21 days. She reports she has not been very compliant with her insulin in the past 5 days, due to fatigue and lack of appetite. Sugars have ranged between 300s and 600s during this time. She also reports abdominal pain, nausea and loose stools that have been present for several weeks, and for which she is following with GI.\"     - per H&P 5/3/24, Jaden Ingram MD     Summary of clinical course:   Patient was admitted to ICU in DKA with positive anion gap, elevated beta hydroxybuterate, and elevated blood glucose level. She was started on DKA insulin gtt and electrolytes were carefully monitored and repleted. She was started on abx for URI/bacterial sinusitis, considering immunocompromised state. Endocrinology was consulted for further recommendations. After anion gap closed x2, she was transitioned from DKA gtt to non-DKA gtt, and transferred to [facility] level of care for further management.     Attempted to reach patient's family to " "inform them of transition out of ICU. Unable to reach.     Recent or scheduled procedures:   - none     Outstanding/pending diagnostics:   - none     Cultures:   - Strep A PCR negative   - COVID, Flu, RSV negative   - Throat culture negative for beta-hemolytic Streptococcus        Mobilization Plan:   - Encourage ambulation     Nutrition Plan:   - Diet: diabetic, controlled carb     Invasive Devices Review  Invasive Devices       Peripheral Intravenous Line  Duration             Peripheral IV 05/03/24 Left;Ventral (anterior) Hand <1 day    Peripheral IV 05/03/24 Right;Ventral (anterior) Forearm <1 day    Peripheral IV 05/04/24 Dorsal (posterior);Left Forearm <1 day                    Rationale for remaining devices: Peripheral IV access     VTE Pharmacologic Prophylaxis: Enoxaparin (Lovenox)  VTE Mechanical Prophylaxis: sequential compression device    Discharge Plan:   Patient should be ready for discharge to home on 5/5/24 after further blood glucose monitoring and recommendations by Endocrinology     Initial Physical Therapy Recommendations: n/a  Initial Occupational Therapy Recommendations: n/a  Initial /Plan: n/a    Home medications that are not reordered and reason why:   - metformin, hold PO DM medications per hospital protocol     Spoke with Dr. Lopez regarding transfer. Please contact critical care via [employer] with any questions or concerns.     Portions of the record may have been created with voice recognition software.  Occasional wrong word or \"sound a like\" substitutions may have occurred due to the inherent limitations of voice recognition software.  Read the chart carefully and recognize, using context, where substitutions have occurred.      Tere Barry MD    PGY-2   ICU Resident   "

## 2024-05-04 NOTE — ASSESSMENT & PLAN NOTE
Pt has a hx of primary hypothyroidism   On levothyroxine 25mcg   TSH slightly elevated, likely in setting of acute illness     Plan  Continue PTA medication   Follow up outpatient with PCP and endocrinology

## 2024-05-04 NOTE — H&P
[facility]  H&P  Name: Jenny Rodrigues 29 y.o. female I MRN: [id number]  Unit/Bed#: ICU 07 I Date of Admission: 5/3/2024   Date of Service: 5/4/2024 I Hospital Day: 1      Assessment/Plan   Diabetic ketoacidosis associated with type 1 diabetes mellitus (HCC)  Assessment & Plan  Lab Results   Component Value Date    HGBA1C 9.6 (H) 01/14/2024       Recent Labs     05/03/24  1734 05/03/24  1837 05/03/24  2105   POCGLU 507* 434* 374*       Blood Sugar Average: Last 72 hrs:  (P) 404    Pt has a hx of T1DM, and reports intermittent compliance with insulin this week, due to URI illness.   She reports blood sugars in the 300s to 600s over the past 5 days.   She was sent to ED for BG of 400  VBG 7.4/29.5/56.9/18    Plan  DKA protocol initiated  BMP, mag, phos q4h  Will replete electrolytes as needed    Assessment & Plan  Lab Results   Component Value Date    HGBA1C 9.6 (H) 01/14/2024       Recent Labs     05/03/24  1734 05/03/24  1837 05/03/24  2105   POCGLU 507* 434* 374*       Blood Sugar Average: Last 72 hrs:  (P) 404      Acute recurrent sinusitis  Assessment & Plan  Pt was treated for subacute sinusitis in March 2024, and was treated by ENT with cefdinir 300mg for 21 days. Nasal culture grew H. Influenzae.  Was instructed to seek care if symptoms returned.   Symptoms of productive cough, sinus pressure, headache, started 5 days ago, and worsened 3 days ago. No improvement with Advair, guanfacine.   Pt states symptoms very similar to prior episode in March.   COVID/Flu/RSV negative. WBC 6.56, Procal 0.06    Plan  Start cefdinir 300mg BID   Pending throat culture  Tessalon perles for symptomatic management     Lupus (HCC)  Assessment & Plan  On methotrexate at home.     Plan  Continue meds    Hypothyroidism  Assessment & Plan  Pt has a hx of primary hypothyroidism   On levothyroxine 25mcg     Plan  Continue home meds           History of Present Illness     HPI: Jenny Rodrigues is a 29 y.o. who  presents with DKA. She has a hx of T1DM, Lupus, hypothyroidism, and presented to the ED for a sore throat and hyperglycemia. She reports sore throat, headache, sinus pressure, fatigue, productive cough that started 5 days ago, and worsened 3 days ago. She had a similar episode in March 2024, was diagnosed with subacute sinusitis, and started on cefdinir for 21 days. She reports she has not been very compliant with her insulin in the past 5 days, due to fatigue and lack of appetite. Sugars have ranged between 300s and 600s during this time. She also reports abdominal pain, nausea and loose stools that have been present for several weeks, and for which she is following with GI.     History obtained from the patient.  Review of Systems   Constitutional:  Positive for fatigue. Negative for chills and fever.   HENT:  Positive for congestion, sinus pressure, sore throat and trouble swallowing.    Eyes:  Negative for photophobia and visual disturbance.   Respiratory:  Positive for cough. Negative for shortness of breath.    Cardiovascular:  Positive for chest pain. Negative for palpitations.   Gastrointestinal:  Positive for abdominal pain and nausea. Negative for vomiting.   Genitourinary:  Negative for dysuria and hematuria.   Musculoskeletal:  Negative for myalgias.   Skin:  Negative for color change.   Neurological:  Positive for headaches. Negative for dizziness and syncope.   All other systems reviewed and are negative.    Disposition: Stepdown Level 1  Historical Information   Past Medical History:  No date: Abdominal pain  No date: Anaphylaxis  No date: Anxiety  No date: Anxiety and depression  12/2020: COVID-19  03/23/2023: Delayed emergence from anesthesia      Comment:  Severe episode of emergence delirium (confused,                combative) after MAC anesthetic on 03/2023 for EGD.                Received versed 2mg, >50mcg precedex, 5mg IV haldol, and                50mcg fentanyl. Waxing and waning episodes  "of agitation.                Any future anesthetics should NOT be done at [facility].  No date: Delirium, induced by drug      Comment:  anesthesia  No date: Depression  No date: Diabetes mellitus (HCC)  No date: Disease of thyroid gland      Comment:  Hashimoto  05/11/2021: DKA, type 1 (HCC)  No date: Eating disorder      Comment:  history of anorexia/bulemia [id number]  No date: Food intolerance  No date: Fracture of fibula      Comment:  R Salter I  No date: Gilbert disease  02/21/2020: Hashimoto's disease  No date: Head injury  No date: Headache(784.0)  No date: Nasal congestion  No date: PTSD (post-traumatic stress disorder)  No date: Rectal bleed  No date: Seizures (HCC)  No date: Type 1 diabetes (HCC) Past Surgical History:  No date: COLONOSCOPY  03/23/2023: EGD  07/06/2020: KNEE SURGERY; Right  No date: NASAL SEPTOPLASTY W/ TURBINOPLASTY  No date: SINUS SURGERY  No date: SKIN BIOPSY  03/22/2021: TURBINOPLASTY; N/A      Comment:  Procedure: TURBINOPLASTY;  Surgeon: Jn Hidalgo MD;  Location: [facility];  Service: ENT  No date: UPPER GASTROINTESTINAL ENDOSCOPY  No date: WISDOM TOOTH EXTRACTION  No date: WRIST SURGERY      Comment:  left; Excision of ganglion   Current Outpatient Medications   Medication Instructions    BD Insulin Syringe U/F 31G X 5/16\" 0.5 ML MISC Use as directly with weekly methotrexate injection.    Belimumab (BENLYSTA IV) Intravenous, Over 1 month, Switching to monthly on 12/5    benzonatate (TESSALON PERLES) 100 mg, Oral, 3 times daily PRN    ciprofloxacin-dexamethasone (CIPRODEX) otic suspension 4 drops, Left Ear, 2 times daily    clindamycin (CLEOCIN T) 1 % lotion 1 application., Topical, 2 times daily    Cyanocobalamin 1,000 mcg, Sublingual, Daily    famotidine (PEPCID) 20 mg, 2 times daily    Fluticasone-Salmeterol (Advair) 250-50 mcg/dose inhaler 1 puff, Inhalation, 2 times daily, Rinse mouth after use.    folic acid (FOLVITE) 1 mg, Oral, Daily    gabapentin " (NEURONTIN) 600 mg, Oral, Daily    Glucagon (Gvoke HypoPen 2-Pack) 1 MG/0.2ML SOAJ 1 mg PRN for severe hypoglycemia    Glucagon HCl (Glucagon Emergency) 1 MG/ML SOLR     glucagon 1 mg    guaifenesin-codeine (GUAIFENESIN AC) 100-10 MG/5ML liquid 5 mL, Oral, 3 times daily PRN    hydroxychloroquine (PLAQUENIL) 200 mg, Oral, 2 times daily with meals    Insulin Disposable Pump (Omnipod 5 G6 Pod, Gen 5,) MISC     insulin lispro (HUMALOG/ADMELOG) 15 Units, Subcutaneous, 3 times daily before meals    Insulin Pen Needle (BD PEN NEEDLE NASIM U/F) 32G X 4 MM MISC Does not apply, 4 times daily    levonorgestrel-ethinyl estradiol (NORDETTE) 0.15-30 MG-MCG per tablet 1 tablet, Oral, Daily    levothyroxine 25 mcg, Oral, Daily    LORazepam (ATIVAN) 0.5 mg, Oral, 2 times daily PRN    metFORMIN (GLUCOPHAGE-XR) 500 mg, Oral, Daily with dinner    methotrexate 20 mg, Subcutaneous, Weekly    miconazole 2 % cream 1 Application, 2 times daily    NovoLOG 100 UNIT/ML injection Up to 100 units per day with insulin pump    OneTouch Verio test strip     sodium chloride 1 g, Oral, As needed    Tresiba FlexTouch 30 Units, Subcutaneous, Daily    Allergies   Allergen Reactions    Insulin Glargine Other (See Comments)     LANTUS BRAND -Burning and redness under skin       Social History     Tobacco Use    Smoking status: Never     Passive exposure: Never    Smokeless tobacco: Never    Tobacco comments:     Tobacco smoke exposure (Father smokes cigars)   Vaping Use    Vaping status: Never Used   Substance Use Topics    Alcohol use: Yes     Comment: rarely    Drug use: Never    Family History   Problem Relation Age of Onset    Hypertension Mother     Migraines Mother         Headache    Diabetes type II Mother     Varicose Veins Mother     Hyperlipidemia Mother     Diabetes Mother     Arthritis Mother     Depression Mother     Hearing loss Mother     Anxiety disorder Mother     Eczema Father     Cholelithiasis Father     Hypertension Father      Sarcoidosis Father         Liver    Hyperlipidemia Father     Diabetes Father     Coronary artery disease Father     Nephrolithiasis Father     Cirrhosis Father     Alcohol abuse Father     Thyroid disease Sister     Hashimoto's thyroiditis Sister     Alcohol abuse Brother     Cancer Family     Diabetes Family     Hypertension Family           Objective                            Vitals I/O      Most Recent Min/Max in 24hrs   Temp 97.7 °F (36.5 °C) Temp  Min: 97.3 °F (36.3 °C)  Max: 98.7 °F (37.1 °C)   Pulse 91 Pulse  Min: 88  Max: 105   Resp 18 Resp  Min: 18  Max: 20   /78 BP  Min: 107/77  Max: 157/96   O2 Sat 98 % SpO2  Min: 97 %  Max: 99 %      Intake/Output Summary (Last 24 hours) at 5/4/2024 0158  Last data filed at 5/4/2024 0131  Gross per 24 hour   Intake 1000 ml   Output 50 ml   Net 950 ml       Diet Regular; Regular House    Invasive Monitoring           Physical Exam   Physical Exam  Vitals and nursing note reviewed.   Skin:     General: Skin is warm and dry.   HENT:      Head: Normocephalic and atraumatic.   Cardiovascular:      Rate and Rhythm: Normal rate and regular rhythm.      Pulses: Normal pulses.      Heart sounds: Normal heart sounds.   Musculoskeletal:      Right lower leg: No edema.      Left lower leg: No edema.   Abdominal:      Palpations: Abdomen is soft.      Tenderness: There is abdominal tenderness in the right upper quadrant and left upper quadrant.   Constitutional:       General: She is not in acute distress.     Appearance: She is ill-appearing.   Pulmonary:      Effort: Pulmonary effort is normal. No respiratory distress.      Breath sounds: Normal breath sounds.   Neurological:      General: No focal deficit present.      Mental Status: She is alert and oriented to person, place and time. Mental status is at baseline.            Diagnostic Studies      EKG:   Imaging: CXR I have personally reviewed pertinent reports.       Medications:  Scheduled PRN   cefdinir, 300 mg, Q12H  ROQUE  chlorhexidine, 15 mL, Q12H ROQUE  enoxaparin, 40 mg, Daily  fluticasone-vilanterol, 1 puff, Daily  folic acid, 1 mg, Daily  gabapentin, 600 mg, Daily  hydroxychloroquine, 200 mg, BID With Meals  levothyroxine, 25 mcg, Early Morning  magnesium sulfate, 4 g, Once  potassium chloride, 20 mEq, Once  sodium phosphate, 12 mmol, Once   Followed by  potassium-sodium phosphateS, 2 tablet, Once      acetaminophen, 975 mg, Q8H PRN  ondansetron, 4 mg, Q6H PRN       Continuous    dextrose 5% lactated ringer's, 250 mL/hr, Last Rate: 250 mL/hr (05/03/24 2239)  insulin regular (HumuLIN R,NovoLIN R) 1 Units/mL in sodium chloride 0.9 % 100 mL infusion, 0.1-30 Units/hr, Last Rate: 2.95 Units/hr (05/03/24 2201)         Labs:    CBC    Recent Labs     05/03/24 1925   WBC 6.56   HGB 13.9   HCT 41.7        BMP    Recent Labs     05/03/24 1925 05/04/24  0032   SODIUM 131* 138   K 4.2 2.9*   CL 93* 106   CO2 19* 21   AGAP 19* 11   BUN 12 9   CREATININE 0.79 0.61   CALCIUM 9.5 8.2*       Coags    No recent results     Additional Electrolytes  Recent Labs     05/04/24  0032   MG 1.7*   PHOS 2.2*          Blood Gas    No recent results  Recent Labs     05/03/24 1948   PHVEN 7.405*   [id number] 29.5*   PO2VEN 56.9*   [id number] 18.1*   BEVEN -5.3   [id number] 88.0*    LFTs  Recent Labs     05/03/24 1925   ALT 20   AST 26   ALKPHOS 91   ALB 4.3   TBILI 0.87       Infectious  Recent Labs     05/03/24 1925   PROCALCITONI 0.06     Glucose  Recent Labs     05/03/24 1925 05/04/24  0032   GLUC 420* 161*             Anticipated Length of Stay is > 2 midnights  Jaden Davila MD         Statement Selected

## 2024-05-04 NOTE — PROGRESS NOTES
[facility]  Progress Note  Name: Jenny Rodrigues I  MRN: [id number]  Unit/Bed#: ICU 07 I Date of Admission: 5/3/2024   Date of Service: 5/4/2024 I Hospital Day: 1    Assessment/Plan   Diabetic ketoacidosis associated with type 1 diabetes mellitus (HCC)  Assessment & Plan  Lab Results   Component Value Date    HGBA1C 9.6 (H) 01/14/2024       Recent Labs     05/04/24  0459 05/04/24  0602 05/04/24  0753 05/04/24  0934   POCGLU 264* 247* 164* 95       Blood Sugar Average: Last 72 hrs:  (P) 217    Pt has a hx of T1DM, and reports intermittent compliance with insulin this week, due to URI illness.   She reports blood sugars in the 300s to 600s over the past 5 days.   She was sent to ED for BG of 400  VBG 7.4/29.5/56.9/18    Plan  DKA protocol initiated  BMP, mag, phos q4h  Will replete electrolytes as needed    Lupus (HCC)  Assessment & Plan  On methotrexate at home.     Plan  Continue meds    Hypothyroidism  Assessment & Plan  Pt has a hx of primary hypothyroidism   On levothyroxine 25mcg   TSH slightly elevated, likely in setting of acute illness     Plan  Continue PTA medication   Follow up outpatient with PCP and endocrinology     Acute recurrent sinusitis  Assessment & Plan  Pt was treated for subacute sinusitis in March 2024, and was treated by ENT with cefdinir 300mg for 21 days. Nasal culture grew H. Influenzae.  Was instructed to seek care if symptoms returned.   Symptoms of productive cough, sinus pressure, headache, started 5 days ago, and worsened 3 days ago. No improvement with Advair, guanfacine.   Pt states symptoms very similar to prior episode in March.   COVID/Flu/RSV negative. WBC 6.56, Procal 0.06    Plan  Start cefdinir 300mg BID   Pending throat culture  Tessalon perles for symptomatic management          Disposition: Med Surg once anion gap closes x2 and patient transitions off insulin gtt     ICU Core Measures     A: Assess, Prevent, and Manage Pain Has pain been  assessed? Yes  Need for changes to pain regimen? No   B: Both SAT/SAT  N/A   C: Choice of Sedation RASS Goal: N/A patient not on sedation  Need for changes to sedation or analgesia regimen? NA   D: Delirium CAM-ICU: Negative   E: Early Mobility  Plan for early mobility? Yes   F: Family Engagement Plan for family engagement today? Yes       Antibiotic Review: Patient on appropriate coverage based on culture data.       Prophylaxis:  VTE VTE covered by:  enoxaparin, Subcutaneous       Stress Ulcer  not ordered         Significant 24hr Events     24hr events:   Patient admitted to ICU o/n for DKA. Currently doing well on DKA insulin protocol with gap closed x1. Having some difficulty tolerating IV K 2/2 discomfort. Improved by decreasing infusion rate and applying ice pack.      Subjective   Review of Systems   Constitutional:  Negative for chills and fever.   HENT:  Negative for ear pain and sore throat.    Eyes:  Negative for pain and visual disturbance.   Respiratory:  Negative for cough and shortness of breath.    Cardiovascular:  Negative for chest pain and palpitations.   Gastrointestinal:  Negative for abdominal pain and vomiting.   Genitourinary:  Negative for dysuria and hematuria.   Musculoskeletal:  Negative for arthralgias and back pain.   Skin:  Negative for color change and rash.   Neurological:  Negative for seizures and syncope.   All other systems reviewed and are negative.      Objective                            Vitals I/O      Most Recent Min/Max in 24hrs   Temp 97.7 °F (36.5 °C) Temp  Min: 97.3 °F (36.3 °C)  Max: 98.7 °F (37.1 °C)   Pulse 91 Pulse  Min: 88  Max: 105   Resp 18 Resp  Min: 18  Max: 20   /78 BP  Min: 107/77  Max: 157/96   O2 Sat 98 % SpO2  Min: 97 %  Max: 99 %      Intake/Output Summary (Last 24 hours) at 5/4/2024 0938  Last data filed at 5/4/2024 0901  Gross per 24 hour   Intake 3957.55 ml   Output 400 ml   Net 3557.55 ml       Diet NPO    Invasive Monitoring           Physical  Exam   Physical Exam  Vitals and nursing note reviewed.   Eyes:      General: No scleral icterus.  Skin:     General: Skin is warm and dry.      Coloration: Skin is not jaundiced.   HENT:      Head: Normocephalic and atraumatic.      Mouth/Throat:      Mouth: Mucous membranes are moist.   Abdominal:      Palpations: Abdomen is soft.      Tenderness: There is no abdominal tenderness. There is no guarding.   Constitutional:       General: She is not in acute distress.     Appearance: She is well-developed. She is not toxic-appearing.      Interventions: She is not sedated and not intubated.  Pulmonary:      Effort: Pulmonary effort is normal. No accessory muscle usage, respiratory distress or accessory muscle usage. She is not intubated.      Breath sounds: Normal breath sounds. No wheezing or rales.   Neurological:      Mental Status: She is alert.             Diagnostic Studies      EKG: Review of telemetry demonstrates no acute events.     Imaging:  I have personally reviewed pertinent reports.    XR chest 1 view portable   ED Interpretation by Rocky Hernandez DO (05/03 2130)   No acute cardiopulmonary disease.      Final Result by Cecelia Gary MD (05/04 0519)      No acute cardiopulmonary disease.            Workstation performed: [id number]                  Medications:  Scheduled PRN   cefdinir, 300 mg, Q12H ROQUE  chlorhexidine, 15 mL, Q12H ROQUE  enoxaparin, 40 mg, Daily  fluticasone-vilanterol, 1 puff, Daily  folic acid, 1 mg, Daily  gabapentin, 600 mg, Daily  hydroxychloroquine, 200 mg, BID With Meals  levothyroxine, 25 mcg, Early Morning      acetaminophen, 975 mg, Q8H PRN  ondansetron, 4 mg, Q6H PRN       Continuous    dextrose 5% lactated ringer's, 250 mL/hr, Last Rate: 250 mL/hr (05/04/24 3415)  insulin regular (HumuLIN R,NovoLIN R) 1 Units/mL in sodium chloride 0.9 % 100 mL infusion, 0.1-30 Units/hr, Last Rate: 2.95 Units/hr (05/04/24 0500)         Labs:    CBC    Recent Labs      05/03/24 1925 05/04/24  0639   WBC 6.56 7.89   HGB 13.9 12.2   HCT 41.7 36.6    185     BMP    Recent Labs     05/04/24  0420 05/04/24  0832   SODIUM 136 136   K 3.3* 3.6    106   CO2 19* 22   AGAP 14* 8   BUN 7 5   CREATININE 0.56* 0.57*   CALCIUM 7.9* 8.0*       Coags    No recent results     Additional Electrolytes  Recent Labs     05/04/24  0032 05/04/24  0639 05/04/24  0832   MG 1.7* 2.4 2.2   PHOS 2.2* 3.8  --           Blood Gas    No recent results  Recent Labs     05/03/24 1948   PHVEN 7.405*   [id number] 29.5*   PO2VEN 56.9*   [id number] 18.1*   BEVEN -5.3   [id number] 88.0*    LFTs  Recent Labs     05/03/24 1925   ALT 20   AST 26   ALKPHOS 91   ALB 4.3   TBILI 0.87       Infectious  Recent Labs     05/03/24 1925   PROCALCITONI 0.06     Glucose  Recent Labs     05/03/24 1925 05/04/24  0032 05/04/24  0420 05/04/24  0832   GLUC 420* 161* 204* 171*               Tere Barry MD   PGY-2  ICU Resident

## 2024-05-04 NOTE — ED ATTENDING ATTESTATION
5/3/2024  I, Kiko Andrade MD, saw and evaluated the patient. I have discussed the patient with the resident/non-physician practitioner and agree with the resident's/non-physician practitioner's findings, Plan of Care, and MDM as documented in the resident's/non-physician practitioner's note, except where noted. All available labs and Radiology studies were reviewed.  I was present for key portions of any procedure(s) performed by the resident/non-physician practitioner and I was immediately available to provide assistance.       At this point I agree with the current assessment done in the Emergency Department.  I have conducted an independent evaluation of this patient a history and physical is as follows: Insulin-dependent diabetic sent to the emergency department by urgent care for evaluation of sore throat and high blood sugar.  Patient reports history of sore throat requiring prolonged course of antibiotics.  Ports elevated blood sugars at home.    Laboratory studies here concerning for DKA.  Insulin infusion ordered per DKA protocol, patient will need admission, will be evaluated in the ED by critical care team to determine appropriate disposition.    Results Reviewed       Procedure Component Value Units Date/Time    Urine Microscopic [[id number]]  (Abnormal) Collected: 05/03/24 1930    Lab Status: Final result Specimen: Urine, Clean Catch Updated: 05/03/24 2027     RBC, UA None Seen /hpf      WBC, UA 2-4 /hpf      Epithelial Cells Occasional /hpf      Bacteria, UA Occasional /hpf     Basic metabolic panel [[id number]]     Lab Status: No result Specimen: Blood     Magnesium [[id number]]     Lab Status: No result Specimen: Blood     Phosphorus [[id number]]     Lab Status: No result Specimen: Blood     FLU/RSV/COVID - if FLU/RSV clinically relevant [[id number]]  (Normal) Collected: 05/03/24 1925    Lab Status: Final result Specimen: Nares from Nose Updated: 05/03/24 2015     SARS-CoV-2 Negative      INFLUENZA A PCR Negative     INFLUENZA B PCR Negative     RSV PCR Negative    Narrative:      FOR PEDIATRIC PATIENTS - copy/paste COVID Guidelines URL to browser: [email or web address]    SARS-CoV-2 assay is a Nucleic Acid Amplification assay intended for the  qualitative detection of nucleic acid from SARS-CoV-2 in nasopharyngeal  swabs. Results are for the presumptive identification of SARS-CoV-2 RNA.    Positive results are indicative of infection with SARS-CoV-2, the virus  causing COVID-19, but do not rule out bacterial infection or co-infection  with other viruses. Laboratories within the [place] and its  territories are required to report all positive results to the appropriate  public health authorities. Negative results do not preclude SARS-CoV-2  infection and should not be used as the sole basis for treatment or other  patient management decisions. Negative results must be combined with  clinical observations, patient history, and epidemiological information.  This test has not been FDA cleared or approved.    This test has been authorized by FDA under an Emergency Use Authorization  (EUA). This test is only authorized for the duration of time the  declaration that circumstances exist justifying the authorization of the  emergency use of an in vitro diagnostic tests for detection of SARS-CoV-2  virus and/or diagnosis of COVID-19 infection under section 564(b)(1) of  the Act, 21 U.S.C. 360bbb-3(b)(1), unless the authorization is terminated  or revoked sooner. The test has been validated but independent review by FDA  and CLIA is pending.    Test performed using [employer]: This RT-PCR assay targets N2,  a region unique to SARS-CoV-2. A conserved region in the E-gene was chosen  for pan-Sarbecovirus detection which includes SARS-CoV-2.    According to CMS-2020-01-R, this platform meets the definition of high-throughput technology.    Throat  culture [[id number]]     Lab Status: No result Specimen: Throat     TSH, 3rd generation with Free T4 reflex [[id number]]  (Normal) Collected: 05/03/24 1925    Lab Status: Final result Specimen: Blood from Arm, Right Updated: 05/03/24 2008     TSH 3RD GENERATON 4.241 uIU/mL     Comprehensive metabolic panel [[id number]]  (Abnormal) Collected: 05/03/24 1925    Lab Status: Final result Specimen: Blood from Arm, Right Updated: 05/03/24 2001     Sodium 131 mmol/L      Potassium 4.2 mmol/L      Chloride 93 mmol/L      CO2 19 mmol/L      ANION GAP 19 mmol/L      BUN 12 mg/dL      Creatinine 0.79 mg/dL      Glucose 420 mg/dL      Calcium 9.5 mg/dL      AST 26 U/L      ALT 20 U/L      Alkaline Phosphatase 91 U/L      Total Protein 7.9 g/dL      Albumin 4.3 g/dL      Total Bilirubin 0.87 mg/dL      eGFR 102 ml/min/1.73sq m     Narrative:      National Kidney Disease Foundation guidelines for Chronic Kidney Disease (CKD):     Stage 1 with normal or high GFR (GFR > 90 mL/min/1.73 square meters)    Stage 2 Mild CKD (GFR = 60-89 mL/min/1.73 square meters)    Stage 3A Moderate CKD (GFR = 45-59 mL/min/1.73 square meters)    Stage 3B Moderate CKD (GFR = 30-44 mL/min/1.73 square meters)    Stage 4 Severe CKD (GFR = 15-29 mL/min/1.73 square meters)    Stage 5 End Stage CKD (GFR <15 mL/min/1.73 square meters)  Note: GFR calculation is accurate only with a steady state creatinine    Strep A PCR [[id number]]  (Normal) Collected: 05/03/24 1925    Lab Status: Final result Specimen: Throat Updated: 05/03/24 2001     STREP A PCR Not Detected    Blood gas, venous [[id number]]  (Abnormal) Collected: 05/03/24 1948    Lab Status: Final result Specimen: Blood from Arm, Right Updated: 05/03/24 1959     pH, Dariel 7.405     pCO2, Dariel 29.5 mm Hg      pO2, Dariel 56.9 mm Hg      HCO3, Dariel 18.1 mmol/L      Base Excess, Dariel -5.3 mmol/L      O2 Content, Dariel 17.2 ml/dL      O2 HGB, VENOUS 88.0 %      MICHAEL TEST Yes    Beta Hydroxybutyrate [[id number]]  (Abnormal)  Collected: 05/03/24 1925    Lab Status: Final result Specimen: Blood from Arm, Right Updated: 05/03/24 1953     Beta- Hydroxybutyrate 3.74 mmol/L     UA w Reflex to Microscopic w Reflex to Culture [[id number]]  (Abnormal) Collected: 05/03/24 1930    Lab Status: Final result Specimen: Urine, Clean Catch Updated: 05/03/24 1944     Color, UA Colorless     Clarity, UA Clear     Specific Gravity, UA 1.041     pH, UA 5.0     Leukocytes, UA Elevated glucose may cause decreased leukocyte values. See urine microscopic for UWBC result     Nitrite, UA Negative     Protein, UA Negative mg/dl      Glucose, UA >=1000 (1%) mg/dl      Ketones,  (3+) mg/dl      Urobilinogen, UA <2.0 mg/dl      Bilirubin, UA Negative     Occult Blood, UA Trace    POCT pregnancy, urine [[id number]]  (Normal) Resulted: 05/03/24 1942    Lab Status: Final result Updated: 05/03/24 1942     EXT Preg Test, Ur Negative     Control Valid    CBC and differential [[id number]] Collected: 05/03/24 1925    Lab Status: Final result Specimen: Blood from Arm, Right Updated: 05/03/24 1938     WBC 6.56 Thousand/uL      RBC 4.70 Million/uL      Hemoglobin 13.9 g/dL      Hematocrit 41.7 %      MCV 89 fL      MCH 29.6 pg      MCHC 33.3 g/dL      RDW 14.5 %      MPV 11.9 fL      Platelets 227 Thousands/uL      nRBC 0 /100 WBCs      Segmented % 44 %      Immature Grans % 1 %      Lymphocytes % 42 %      Monocytes % 11 %      Eosinophils Relative 1 %      Basophils Relative 1 %      Absolute Neutrophils 2.91 Thousands/µL      Absolute Immature Grans 0.08 Thousand/uL      Absolute Lymphocytes 2.74 Thousands/µL      Absolute Monocytes 0.71 Thousand/µL      Eosinophils Absolute 0.08 Thousand/µL      Basophils Absolute 0.04 Thousands/µL     Fingerstick Glucose (POCT) [[id number]]  (Abnormal) Collected: 05/03/24 1837    Lab Status: Final result Specimen: Blood Updated: 05/03/24 1838     POC Glucose 434 mg/dl           XR chest 1 view portable    (Results Pending)         ED  Course         Critical Care Time  CriticalCare Time    Date/Time: 5/3/2024 9:04 PM    Performed by: Kiko Andrade MD  Authorized by: Kiko Andrade MD    Critical care provider statement:     Critical care time (minutes):  60    Critical care time was exclusive of:  Separately billable procedures and treating other patients and teaching time    Critical care was necessary to treat or prevent imminent or life-threatening deterioration of the following conditions:  Endocrine crisis    Critical care was time spent personally by me on the following activities:  Examination of patient, re-evaluation of patient's condition and ordering and review of laboratory studies    I assumed direction of critical care for this patient from another provider in my specialty: no    Comments:      DKA requiring insulin infusion, admission

## 2024-05-04 NOTE — ASSESSMENT & PLAN NOTE
Pt was treated for subacute sinusitis in March 2024, and was treated by ENT with cefdinir 300mg for 21 days. Nasal culture grew H. Influenzae.  Was instructed to seek care if symptoms returned.   Symptoms of productive cough, sinus pressure, headache, started 5 days ago, and worsened 3 days ago. No improvement with Advair, guanfacine.   Pt states symptoms very similar to prior episode in March.   COVID/Flu/RSV negative. WBC 6.56, Procal 0.06    Plan  Start cefdinir 300mg BID   Pending throat culture  Tessalon perles for symptomatic management

## 2024-05-04 NOTE — ASSESSMENT & PLAN NOTE
Patient Pt was treated for subacute sinusitis in March 2024, and was treated by ENT with cefdinir 300mg for 21 days. Nasal culture grew H. Influenzae.  Was instructed to seek care if symptoms returned.   Symptoms of productive cough, sinus pressure, headache, started 5 days ago, and worsened 3 days ago. No improvement with Advair, guanfacine.   Pt states symptoms very similar to prior episode in March.   COVID/Flu/RSV negative. WBC 6.56, Procal 0.06    Plan  Start cefdinir given hx of prior infection  Pending throat culture  Tessalon perles for symptomatic management

## 2024-05-04 NOTE — ASSESSMENT & PLAN NOTE
Lab Results   Component Value Date    HGBA1C 9.6 (H) 01/14/2024       Recent Labs     05/03/24  1734 05/03/24  1837 05/03/24  2105   POCGLU 507* 434* 374*       Blood Sugar Average: Last 72 hrs:  (P) 404

## 2024-05-04 NOTE — PLAN OF CARE
Problem: METABOLIC, FLUID AND ELECTROLYTES - ADULT  Goal: Glucose maintained within target range  Description: INTERVENTIONS:  - Monitor Blood Glucose as ordered  - Assess for signs and symptoms of hyperglycemia and hypoglycemia  - Administer ordered medications to maintain glucose within target range  - Assess nutritional intake and initiate nutrition service referral as needed  Outcome: Progressing

## 2024-05-04 NOTE — PLAN OF CARE
Problem: PAIN - ADULT  Goal: Verbalizes/displays adequate comfort level or baseline comfort level  Description: Interventions:  - Encourage patient to monitor pain and request assistance  - Assess pain using appropriate pain scale  - Administer analgesics based on type and severity of pain and evaluate response  - Implement non-pharmacological measures as appropriate and evaluate response  - Consider cultural and social influences on pain and pain management  - Notify physician/advanced practitioner if interventions unsuccessful or patient reports new pain  Outcome: Progressing     Problem: INFECTION - ADULT  Goal: Absence or prevention of progression during hospitalization  Description: INTERVENTIONS:  - Assess and monitor for signs and symptoms of infection  - Monitor lab/diagnostic results  - Monitor all insertion sites, i.e. indwelling lines, tubes, and drains  - Monitor endotracheal if appropriate and nasal secretions for changes in amount and color  - [place] appropriate cooling/warming therapies per order  - Administer medications as ordered  - Instruct and encourage patient and family to use good hand hygiene technique  - Identify and instruct in appropriate isolation precautions for identified infection/condition  Outcome: Progressing  Goal: Absence of fever/infection during neutropenic period  Description: INTERVENTIONS:  - Monitor WBC    Outcome: Progressing     Problem: SAFETY ADULT  Goal: Patient will remain free of falls  Description: INTERVENTIONS:  - Educate patient/family on patient safety including physical limitations  - Instruct patient to call for assistance with activity   - Consult OT/PT to assist with strengthening/mobility   - Keep Call bell within reach  - Keep bed low and locked with side rails adjusted as appropriate  - Keep care items and personal belongings within reach  - Initiate and maintain comfort rounds  - Make Fall Risk Sign visible to staff  - Apply yellow socks and bracelet  for high fall risk patients  - Consider moving patient to room near nurses station  Outcome: Progressing  Goal: Maintain or return to baseline ADL function  Description: INTERVENTIONS:  -  Assess patient's ability to carry out ADLs; assess patient's baseline for ADL function and identify physical deficits which impact ability to perform ADLs (bathing, care of mouth/teeth, toileting, grooming, dressing, etc.)  - Assess/evaluate cause of self-care deficits   - Assess range of motion  - Assess patient's mobility; develop plan if impaired  - Assess patient's need for assistive devices and provide as appropriate  - Encourage maximum independence but intervene and supervise when necessary  - Involve family in performance of ADLs  - Assess for home care needs following discharge   - Consider OT consult to assist with ADL evaluation and planning for discharge  - Provide patient education as appropriate  Outcome: Progressing  Goal: Maintains/Returns to pre admission functional level  Description: INTERVENTIONS:  - Perform AM-PAC 6 Click Basic Mobility/ Daily Activity assessment daily.  - Set and communicate daily mobility goal to care team and patient/family/caregiver.   - Collaborate with rehabilitation services on mobility goals if consulted  - Perform Range of Motion   - Reposition patient   - Dangle patient   - Stand patient   - Ambulate patient   - Out of bed to chair   - Out of bed for meals   - Out of bed for toileting  - Record patient progress and toleration of activity level   Outcome: Progressing     Problem: DISCHARGE PLANNING  Goal: Discharge to home or other facility with appropriate resources  Description: INTERVENTIONS:  - Identify barriers to discharge w/patient and caregiver  - Arrange for needed discharge resources and transportation as appropriate  - Identify discharge learning needs (meds, wound care, etc.)  - Arrange for interpretive services to assist at discharge as needed  - Refer to Case Management  Department for coordinating discharge planning if the patient needs post-hospital services based on physician/advanced practitioner order or complex needs related to functional status, cognitive ability, or social support system  Outcome: Progressing     Problem: RESPIRATORY - ADULT  Goal: Achieves optimal ventilation and oxygenation  Description: INTERVENTIONS:  - Assess for changes in respiratory status  - Assess for changes in mentation and behavior  - Position to facilitate oxygenation and minimize respiratory effort  - Oxygen administered by appropriate delivery if ordered  - Initiate smoking cessation education as indicated  - Encourage broncho-pulmonary hygiene including cough, deep breathe, Incentive Spirometry  - Assess the need for suctioning and aspirate as needed  - Assess and instruct to report SOB or any respiratory difficulty  - Respiratory Therapy support as indicated  Outcome: Progressing     Problem: METABOLIC, FLUID AND ELECTROLYTES - ADULT  Goal: Glucose maintained within target range  Description: INTERVENTIONS:  - Monitor Blood Glucose as ordered  - Assess for signs and symptoms of hyperglycemia and hypoglycemia  - Administer ordered medications to maintain glucose within target range  - Assess nutritional intake and initiate nutrition service referral as needed  Outcome: Progressing     Problem: MUSCULOSKELETAL - ADULT  Goal: Maintain or return mobility to safest level of function  Description: INTERVENTIONS:  - Assess patient's ability to carry out ADLs; assess patient's baseline for ADL function and identify physical deficits which impact ability to perform ADLs (bathing, care of mouth/teeth, toileting, grooming, dressing, etc.)  - Assess/evaluate cause of self-care deficits   - Assess range of motion  - Assess patient's mobility  - Assess patient's need for assistive devices and provide as appropriate  - Encourage maximum independence but intervene and supervise when necessary  - Involve  family in performance of ADLs  - Assess for home care needs following discharge   - Consider OT consult to assist with ADL evaluation and planning for discharge  - Provide patient education as appropriate  Outcome: Progressing  Goal: Maintain proper alignment of affected body part  Description: INTERVENTIONS:  - Support, maintain and protect limb and body alignment  - Provide patient/ family with appropriate education  Outcome: Progressing

## 2024-05-04 NOTE — ASSESSMENT & PLAN NOTE
Lab Results   Component Value Date    HGBA1C 9.6 (H) 01/14/2024       Recent Labs     05/03/24  1734 05/03/24  1837 05/03/24  2105   POCGLU 507* 434* 374*       Blood Sugar Average: Last 72 hrs:  (P) 404    Pt has a hx of T1DM, and reports intermittent compliance with insulin this week, due to URI illness.   She reports blood sugars in the 300s to 600s over the past 5 days.   She was sent to ED for BG of 400  VBG 7.4/29.5/56.9/18    Plan  DKA protocol initiated  BMP, mag, phos q4h  Will replete electrolytes as needed

## 2024-05-04 NOTE — CONSULTS
Consultation - Jenny Rodrigues 29 y.o. female MRN: [id number]    Unit/Bed#: ICU 07 Encounter: [id number]      Assessment/Plan     Assessment:  This is a 29 y.o.-year-old female with type 1 diabetes admitted with DKA    Plan:  #DKA (resolved)  #T1DM    A1c: 9.6% (01/2024)  Home regimen: Tresiba 32 units daily, NovoLog  ICR 1:6, ISF 50, .   Inpatient regimen: IV insulin  Recommendations: Continue on IV insulin for now given poor appetite.  Will reassess with plan to transition to subcutaneous basal bolus insulin regimen as able.    #Hypothyroidism  TSH 4.24 - acceptable with ongoing acute illness  Continue on previous home regimen of levothyroxine.    CC: Diabetes Consult    History of Present Illness     HPI: Jenny Rodrigues is a 29 y.o. year old female with history of type 1 diabetes, lupus and hypothyroidism who presents to the ED with multiple complaints including sore throat, cough, abdominal pain, nausea, diarrhea and generalized malaise of few days duration. She endorsed skipping doses of insulin due to illness with resultant hyperglycemia at home. Labs done on presentation consistent with DKA-elevated glucose of 420, anion gap 19, bicarb 18, beta hydroxybutyrate 3.74.  Patient was started on IV insulin and IV fluids which she currently remains on.    On evaluation, patient still reporting symptoms including poor appetite although slowly improving.  At outpatient she follows with Dr. Hilton with [facility] endocrinology which she last saw in February.  Reports being on subcutaneous basal bolus insulin regimen at home- she was previously on insulin pump and was initially planning to restart back on the pump in a few days.  Last admission for DKA was in January 2024.    Inpatient consult to Endocrinology  Consult performed by: Tio Soliman MD  Consult ordered by: Herberth Her MD          Review of Systems  Constitutional: Appetite change.  Respiratory: Negative for shortness of  breath, wheezing, cough.  Cardiovascular: Negative for chest pain and palpitations.   Gastrointestinal: nausea   Musculoskeletal: Negative for arthralgias.   Neurological: Negative for dizziness, light-headedness and headaches.   All other ROS reviewed and negative.    Historical Information   Past Medical History:   Diagnosis Date    Abdominal pain     Anaphylaxis     Anxiety     Anxiety and depression     COVID-19 12/2020    Delayed emergence from anesthesia 03/23/2023    Severe episode of emergence delirium (confused, combative) after MAC anesthetic on 03/2023 for EGD. Received versed 2mg, >50mcg precedex, 5mg IV haldol, and 50mcg fentanyl. Waxing and waning episodes of agitation. Any future anesthetics should NOT be done at [facility].    Delirium, induced by drug     anesthesia    Depression     Diabetes mellitus (HCC)     Disease of thyroid gland     Hashimoto    DKA, type 1 (HCC) 05/11/2021    Eating disorder     history of anorexia/bulemia 4203-5583    Food intolerance     Fracture of fibula     R Salter I    Gilbert disease     Hashimoto's disease 02/21/2020    Head injury     Headache(784.0)     Nasal congestion     PTSD (post-traumatic stress disorder)     Rectal bleed     Seizures (HCC)     Type 1 diabetes (HCC)      Past Surgical History:   Procedure Laterality Date    COLONOSCOPY      EGD  03/23/2023    KNEE SURGERY Right 07/06/2020    NASAL SEPTOPLASTY W/ TURBINOPLASTY      SINUS SURGERY      SKIN BIOPSY      TURBINOPLASTY N/A 03/22/2021    Procedure: TURBINOPLASTY;  Surgeon: Jn Hidalgo MD;  Location: [facility];  Service: ENT    UPPER GASTROINTESTINAL ENDOSCOPY      WISDOM TOOTH EXTRACTION      WRIST SURGERY      left; Excision of ganglion     Social History   Social History     Substance and Sexual Activity   Alcohol Use Yes    Comment: rarely     Social History     Substance and Sexual Activity   Drug Use Never     Social History     Tobacco Use   Smoking Status Never    Passive exposure: Never    Smokeless Tobacco Never   Tobacco Comments    Tobacco smoke exposure (Father smokes cigars)     Family History:   Family History   Problem Relation Age of Onset    Hypertension Mother     Migraines Mother         Headache    Diabetes type II Mother     Varicose Veins Mother     Hyperlipidemia Mother     Diabetes Mother     Arthritis Mother     Depression Mother     Hearing loss Mother     Anxiety disorder Mother     Eczema Father     Cholelithiasis Father     Hypertension Father     Sarcoidosis Father         Liver    Hyperlipidemia Father     Diabetes Father     Coronary artery disease Father     Nephrolithiasis Father     Cirrhosis Father     Alcohol abuse Father     Thyroid disease Sister     Hashimoto's thyroiditis Sister     Alcohol abuse Brother     Cancer Family     Diabetes Family     Hypertension Family        Meds/Allergies   Current Facility-Administered Medications   Medication Dose Route Frequency Provider Last Rate Last Admin    acetaminophen (TYLENOL) tablet 975 mg  975 mg Oral Q8H PRN Jaden Davila MD   975 mg at 05/04/24 0054    cefdinir (OMNICEF) capsule 300 mg  300 mg Oral Q12H [facility] Tere Barry MD   300 mg at 05/04/24 0107    chlorhexidine (PERIDEX) 0.12 % oral rinse 15 mL  15 mL Mouth/Throat Q12H [facility] Jaden Davila MD        dextrose 5 % in lactated Ringer's infusion  250 mL/hr Intravenous Continuous Jaden Davila  mL/hr at 05/04/24 0459 250 mL/hr at 05/04/24 0459    enoxaparin (LOVENOX) subcutaneous injection 40 mg  40 mg Subcutaneous Daily Jaden Davila MD   40 mg at 05/04/24 0944    fluticasone-vilanterol 200-25 mcg/actuation 1 puff  1 puff Inhalation Daily Jaden Davila MD        folic acid (FOLVITE) tablet 1 mg  1 mg Oral Daily Jaden Davila MD        gabapentin (NEURONTIN) capsule 600 mg  600 mg Oral Daily Jaden Davila MD        hydroxychloroquine (PLAQUENIL) tablet 200 mg  200 mg Oral BID With Meals Jaden Davila MD         insulin regular (HumuLIN R,NovoLIN R) 1 Units/mL in sodium chloride 0.9 % 100 mL infusion  0.1-30 Units/hr Intravenous Continuous Rocky Hernandez, DO 1.5 mL/hr at 05/04/24 0940 1.475 Units/hr at 05/04/24 0940    levothyroxine tablet 25 mcg  25 mcg Oral Early Morning Jaden Davila MD   25 mcg at 05/04/24 0518    ondansetron (ZOFRAN) injection 4 mg  4 mg Intravenous Q6H PRN Jaden Davila MD        potassium chloride 20 mEq IVPB (premix)  20 mEq Intravenous Once Jaden Davila MD 50 mL/hr at 05/04/24 1019 20 mEq at 05/04/24 1019     Allergies   Allergen Reactions    Insulin Glargine Other (See Comments)     LANTUS BRAND -Burning and redness under skin        Objective   Vitals: Blood pressure 113/78, pulse 91, temperature 97.7 °F (36.5 °C), temperature source Oral, resp. rate 18, weight 58.3 kg (128 lb 8.5 oz), SpO2 98%, not currently breastfeeding.    Intake/Output Summary (Last 24 hours) at 5/4/2024 1137  Last data filed at 5/4/2024 0901  Gross per 24 hour   Intake 3957.55 ml   Output 400 ml   Net 3557.55 ml     Invasive Devices       Peripheral Intravenous Line  Duration             Peripheral IV 05/03/24 Left;Ventral (anterior) Hand <1 day    Peripheral IV 05/03/24 Right;Ventral (anterior) Forearm <1 day    Peripheral IV 05/04/24 Dorsal (posterior);Left Forearm <1 day                    Physical Exam  Physical exam:   Constitutional:Oriented to person, place, and time  Head: Normocephalic and atraumatic.   Neck: Normal range of motion.  Supple, No thyromegaly  Pulmonary/Chest: Effort normal/ breathing comfortably on room air. CTAB   CVS:  Regular rate and rhythm, S1-S2 +  Abdomen: soft, nondistended, nontender  Musculoskeletal: Normal range of motion.   Skin:  Warm, no rash  Extremities:  No pedal edema  Psychiatric: mildly lethargic    The history was obtained from the review of the chart, patient.    Lab Results:       Lab Results   Component Value Date    WBC 7.89 05/04/2024     "HGB 12.2 05/04/2024    HCT 36.6 05/04/2024    MCV 90 05/04/2024     05/04/2024     Lab Results   Component Value Date/Time    BUN 5 05/04/2024 08:32 AM    BUN 9 07/09/2023 02:27 AM    K 3.6 05/04/2024 08:32 AM    K 3.5 07/09/2023 02:27 AM     05/04/2024 08:32 AM     07/09/2023 02:27 AM    CO2 22 05/04/2024 08:32 AM    CO2 22 07/09/2023 02:27 AM    CREATININE 0.57 (L) 05/04/2024 08:32 AM    CREATININE 0.63 07/09/2023 02:27 AM    AST 26 05/03/2024 07:25 PM    AST 12 07/07/2023 07:41 PM    ALT 20 05/03/2024 07:25 PM    ALT 12 07/07/2023 07:41 PM    TP 7.9 05/03/2024 07:25 PM    TP 7.5 07/07/2023 07:41 PM    ALB 4.3 05/03/2024 07:25 PM    ALB 4.5 07/07/2023 07:41 PM    GLOB 2.9 02/11/2021 02:55 PM     No results for input(s): \"CHOL\", \"HDL\", \"LDL\", \"TRIG\", \"VLDL\" in the last 72 hours.  No results found for: \"MICROALBUR\", \"[id number]\"  POC Glucose (mg/dl)   Date Value   05/04/2024 147 (H)   05/04/2024 138   05/04/2024 95   05/04/2024 164 (H)   05/04/2024 247 (H)   05/04/2024 264 (H)   05/04/2024 218 (H)   05/04/2024 128   05/04/2024 159 (H)   05/04/2024 155 (H)       Imaging Studies: I have personally reviewed pertinent reports.      Portions of the record may have been created with voice recognition software.  "

## 2024-05-05 VITALS
DIASTOLIC BLOOD PRESSURE: 67 MMHG | SYSTOLIC BLOOD PRESSURE: 106 MMHG | WEIGHT: 128.53 LBS | HEART RATE: 90 BPM | HEIGHT: 61 IN | BODY MASS INDEX: 24.27 KG/M2 | TEMPERATURE: 98.4 F | OXYGEN SATURATION: 98 % | RESPIRATION RATE: 18 BRPM

## 2024-05-05 PROBLEM — R13.10 ODYNOPHAGIA: Status: ACTIVE | Noted: 2021-02-09

## 2024-05-05 LAB
ANION GAP SERPL CALCULATED.3IONS-SCNC: 12 MMOL/L (ref 4–13)
BUN SERPL-MCNC: 6 MG/DL (ref 5–25)
CALCIUM SERPL-MCNC: 8.4 MG/DL (ref 8.4–10.2)
CHLORIDE SERPL-SCNC: 103 MMOL/L (ref 96–108)
CO2 SERPL-SCNC: 21 MMOL/L (ref 21–32)
CREAT SERPL-MCNC: 0.57 MG/DL (ref 0.6–1.3)
GFR SERPL CREATININE-BSD FRML MDRD: 127 ML/MIN/1.73SQ M
GLUCOSE SERPL-MCNC: 120 MG/DL (ref 65–140)
GLUCOSE SERPL-MCNC: 143 MG/DL (ref 65–140)
GLUCOSE SERPL-MCNC: 175 MG/DL (ref 65–140)
GLUCOSE SERPL-MCNC: 184 MG/DL (ref 65–140)
GLUCOSE SERPL-MCNC: 205 MG/DL (ref 65–140)
GLUCOSE SERPL-MCNC: 249 MG/DL (ref 65–140)
GLUCOSE SERPL-MCNC: 280 MG/DL (ref 65–140)
GLUCOSE SERPL-MCNC: 283 MG/DL (ref 65–140)
GLUCOSE SERPL-MCNC: 292 MG/DL (ref 65–140)
GLUCOSE SERPL-MCNC: 301 MG/DL (ref 65–140)
GLUCOSE SERPL-MCNC: 93 MG/DL (ref 65–140)
MAGNESIUM SERPL-MCNC: 1.9 MG/DL (ref 1.9–2.7)
PHOSPHATE SERPL-MCNC: 4.2 MG/DL (ref 2.7–4.5)
POTASSIUM SERPL-SCNC: 3.7 MMOL/L (ref 3.5–5.3)
SODIUM SERPL-SCNC: 136 MMOL/L (ref 135–147)

## 2024-05-05 PROCEDURE — 83735 ASSAY OF MAGNESIUM: CPT | Performed by: STUDENT IN AN ORGANIZED HEALTH CARE EDUCATION/TRAINING PROGRAM

## 2024-05-05 PROCEDURE — 84100 ASSAY OF PHOSPHORUS: CPT | Performed by: STUDENT IN AN ORGANIZED HEALTH CARE EDUCATION/TRAINING PROGRAM

## 2024-05-05 PROCEDURE — 80048 BASIC METABOLIC PNL TOTAL CA: CPT | Performed by: STUDENT IN AN ORGANIZED HEALTH CARE EDUCATION/TRAINING PROGRAM

## 2024-05-05 PROCEDURE — NC001 PR NO CHARGE: Performed by: PHYSICIAN ASSISTANT

## 2024-05-05 PROCEDURE — 99239 HOSP IP/OBS DSCHRG MGMT >30: CPT | Performed by: PHYSICIAN ASSISTANT

## 2024-05-05 PROCEDURE — 82948 REAGENT STRIP/BLOOD GLUCOSE: CPT

## 2024-05-05 RX ORDER — ACETAMINOPHEN 325 MG/1
975 TABLET ORAL EVERY 8 HOURS PRN
Start: 2024-05-05

## 2024-05-05 RX ORDER — INSULIN LISPRO 100 [IU]/ML
8 INJECTION, SOLUTION INTRAVENOUS; SUBCUTANEOUS
Status: DISCONTINUED | OUTPATIENT
Start: 2024-05-05 | End: 2024-05-05 | Stop reason: HOSPADM

## 2024-05-05 RX ORDER — INSULIN LISPRO 100 [IU]/ML
1-5 INJECTION, SOLUTION INTRAVENOUS; SUBCUTANEOUS
Status: DISCONTINUED | OUTPATIENT
Start: 2024-05-05 | End: 2024-05-05 | Stop reason: HOSPADM

## 2024-05-05 RX ORDER — INSULIN DEGLUDEC 100 U/ML
32 INJECTION, SOLUTION SUBCUTANEOUS DAILY
Start: 2024-05-05

## 2024-05-05 RX ORDER — CEFDINIR 300 MG/1
300 CAPSULE ORAL EVERY 12 HOURS SCHEDULED
Qty: 10 CAPSULE | Refills: 0 | Status: SHIPPED | OUTPATIENT
Start: 2024-05-05 | End: 2024-05-10

## 2024-05-05 RX ADMIN — INSULIN DETEMIR 30 UNITS: 100 INJECTION, SOLUTION SUBCUTANEOUS at 10:38

## 2024-05-05 RX ADMIN — INSULIN LISPRO 3 UNITS: 100 INJECTION, SOLUTION INTRAVENOUS; SUBCUTANEOUS at 11:40

## 2024-05-05 RX ADMIN — CEFDINIR 300 MG: 300 CAPSULE ORAL at 08:18

## 2024-05-05 RX ADMIN — SODIUM CHLORIDE 0.3 UNITS/HR: 9 INJECTION, SOLUTION INTRAVENOUS at 01:54

## 2024-05-05 RX ADMIN — FLUTICASONE FUROATE AND VILANTEROL TRIFENATATE 1 PUFF: 200; 25 POWDER RESPIRATORY (INHALATION) at 08:18

## 2024-05-05 RX ADMIN — ENOXAPARIN SODIUM 40 MG: 40 INJECTION SUBCUTANEOUS at 08:18

## 2024-05-05 RX ADMIN — INSULIN LISPRO 8 UNITS: 100 INJECTION, SOLUTION INTRAVENOUS; SUBCUTANEOUS at 13:00

## 2024-05-05 RX ADMIN — LEVOTHYROXINE SODIUM 25 MCG: 25 TABLET ORAL at 06:02

## 2024-05-05 NOTE — ASSESSMENT & PLAN NOTE
Patient presented with sore throat, tested negative for strep and COVID  Recently treated by ENT with antibiotics  Continue cefdinir

## 2024-05-05 NOTE — UTILIZATION REVIEW
NOTIFICATION OF INPATIENT ADMISSION   AUTHORIZATION REQUEST   SERVICING FACILITY:   [place] [address]  Tax ID: [id number]  NPI: [id number]   ATTENDING PROVIDER:  Attending Name and NPI#: Morgan Lopez Md [[id number]]  Address: [address] [place] [address]  Phone: [phone]     ADMISSION INFORMATION:  Place of Service: Inpatient [facility]  Place of Service Code: 21  Inpatient Admission Date/Time: 5/3/24  9:28 PM  Discharge Date/Time: No discharge date for patient encounter.  Admitting Diagnosis Code/Description:  Fatigue [R53.83]  Hyperglycemia [R73.9]  URI (upper respiratory infection) [J06.9]  Diabetic ketoacidosis without coma associated with type 1 diabetes mellitus (HCC) [E10.10]     UTILIZATION REVIEW CONTACT:  Shira Ruby Utilization   Network Utilization Review Department  Phone: [phone]  Fax: [phone]  Email: [email or web address]  Contact for approvals/pending authorizations, clinical reviews, and discharge.     PHYSICIAN ADVISORY SERVICES:  Medical Necessity Denial & Ptfs-ug-Fbei Review  Phone: [phone]  Fax: [phone]  Email: [email or web address]     DISCHARGE SUPPORT TEAM:  For Patients Discharge Needs & Updates  Phone: [phone] opt. 2 Fax: [phone]  Email: [email or web address]

## 2024-05-05 NOTE — DISCHARGE INSTR - AVS FIRST PAGE
Dear Jenny Rodrigues,     It was our pleasure to care for you here at [facility].  It is our hope that we were always able to exceed the expected standards for your care during your stay.  You were hospitalized due to DKA.  You were cared for on the ICU floor by Lila Lynn PA-C under the service of Morgan Lopez MD with the [facility] who covers for your primary care physician (PCP), MARGAERT Meadows, while you were hospitalized.  If you have any questions or concerns related to this hospitalization, you may contact us at .  For follow up as well as any medication refills, we recommend that you follow up with your primary care physician.  A registered nurse will reach out to you by phone within a few days after your discharge to answer any additional questions that you may have after going home.  However, at this time we provide for you here, the most important instructions / recommendations at discharge:     Notable Medication Adjustments -   Resume Tresiba 32 units  Continue Humalog insulin to carb ratio 1:6  Finish course of antibiotics  Testing Required after Discharge -   Monitor sugars  A1c  ** Please contact your PCP to request testing orders for any of the testing recommended here **  Important follow up information -   endocrinology  Other Instructions -     Please review this entire after visit summary as additional general instructions including medication list, appointments, activity, diet, any pertinent wound care, and other additional recommendations from your care team that may be provided for you.      Sincerely,     Lila Lynn PA-C

## 2024-05-05 NOTE — DISCHARGE SUMMARY
[facility]  Discharge- Jenny Rodrigues 1995, 29 y.o. female MRN: [id number]  Unit/Bed#: ICU 07 Encounter: [id number]  Primary Care Provider: MARGARET Meadows   Date and time admitted to hospital: 5/3/2024  6:30 PM    * Diabetic ketoacidosis associated with type 1 diabetes mellitus (HCC)  Assessment & Plan  Lab Results   Component Value Date    HGBA1C 9.6 (H) 01/14/2024     Recent Labs     05/05/24  1133 05/05/24  1258 05/05/24  1354 05/05/24  1429   POCGLU 292* 280* 205* 175*     Patient was initially treated in stepdown level 1 for DKA with evidence of hyperglycemia and metabolic acidosis.  This was felt to be due to patient not taking her insulin appropriately in the setting of illness  Anion gap closed  Insulin drip placed on admission,  transitioned back to basal bolus regimen today with eventual plan to transition to insulin pump per endocrinology  Continue Tresiba and carb correction 1:6 mealtime insulin at discharge  Due for A1c to be repeated, can be outpatient      Blood Sugar Average: Last 72 hrs:  (P) [id number]      Odynophagia  Assessment & Plan  Patient presented with sore throat, tested negative for strep and COVID  Recently treated by ENT with antibiotics  Continue cefdinir    Lupus (HCC)  Assessment & Plan  Continue follow-up with rheumatology  Continue home meds including methotrexate    Hypothyroidism  Assessment & Plan  Continue Synthroid at current dose per endocrinology      Medical Problems       Resolved Problems  Date Reviewed: 5/5/2024   None       Discharging Physician / Practitioner: Lila Lynn PA-C  PCP: MARGARET Meadows  Admission Date:   Admission Orders (From admission, onward)       Ordered        05/03/24 2128  INPATIENT ADMISSION  Once                          Discharge Date: 05/05/24    Consultations During Hospital Stay:  endocrinology    Procedures Performed:       Significant Findings / Test Results:   As  above    Incidental Findings:   none     Test Results Pending at Discharge (will require follow up):   none     Outpatient Tests Requested:  a1c    Complications:  none    Reason for Admission: High blood sugars    Hospital Course:   Jenny Rodrigues is a 29 y.o. female patient with underlying history of type I diabetes and lupus who originally presented to the hospital on 5/3/2024 due to hyperglycemia and anion gap acidosis consistent with DKA.  She was initially treated in the intensive care unit on DKA protocol insulin drip and IV fluids.  She had not been taking her insulin regularly because her oral intake was decreased in the setting of severe sore throat.  She did test negative for strep, and clinically improved over the course of her hospitalization.  She was transitioned back to basal bolus regimen at the recommendation of endocrinology and was stable for discharge home today      Please see above list of diagnoses and related plan for additional information.     Condition at Discharge: stable    Discharge Day Visit / Exam:   * Please refer to separate progress note for these details *    Discussion with Family:     Discharge instructions/Information to patient and family:   See after visit summary for information provided to patient and family.      Provisions for Follow-Up Care:  See after visit summary for information related to follow-up care and any pertinent home health orders.      Mobility at time of Discharge:   Basic Mobility Inpatient Raw Score: 24  JH-HLM Goal: 8: Walk 250 feet or more  JH-HLM Achieved: 7: Walk 25 feet or more  HLM Goal achieved. Continue to encourage appropriate mobility.     Disposition:   Home    Planned Readmission: none     Discharge Statement:  I spent 40 minutes discharging the patient. This time was spent on the day of discharge. I had direct contact with the patient on the day of discharge. Greater than 50% of the total time was spent examining patient, answering all  patient questions, arranging and discussing plan of care with patient as well as directly providing post-discharge instructions.  Additional time then spent on discharge activities.  Case discussed with endocrinology again in the afternoon and I came back and checked on the patient again who said she would still really like to go home right now    Discharge Medications:  See after visit summary for reconciled discharge medications provided to patient and/or family.      **Please Note: This note may have been constructed using a voice recognition system**

## 2024-05-05 NOTE — ASSESSMENT & PLAN NOTE
Lab Results   Component Value Date    HGBA1C 9.6 (H) 01/14/2024     Recent Labs     05/05/24  1133 05/05/24  1258 05/05/24  1354 05/05/24  1429   POCGLU 292* 280* 205* 175*     Patient was initially treated in stepdown level 1 for DKA with evidence of hyperglycemia and metabolic acidosis.  This was felt to be due to patient not taking her insulin appropriately in the setting of illness  Anion gap closed  Insulin drip placed on admission,  transitioned back to basal bolus regimen today with eventual plan to transition to insulin pump per endocrinology  Continue Tresiba and carb correction 1:6 mealtime insulin at discharge  Due for A1c to be repeated, can be outpatient      Blood Sugar Average: Last 72 hrs:  (P) [id number]

## 2024-05-05 NOTE — QUICK NOTE
Notified by covering team this morning of symptomatic improvement and that Jenny would really like to be discharged home today.   Reviewed chart.   Plan: Recommend transitioning off IV to basal bolus insulin regimen to lantus 30 units daily (dc IV isnulin 2 hours after first dose of lantus is given) and start on humalog 8 units tid with meals with correctional insulin. Monitor accuchecks, if glucose is within reasonable limit (<200mg/dl) after transition and she remains asymptomatic can be discharged home.

## 2024-05-05 NOTE — PROGRESS NOTES
[facility]  Progress Note  Name: Jenny Rodrigues I  MRN: [id number]  Unit/Bed#: ICU 07 I Date of Admission: 5/3/2024   Date of Service: 5/5/2024 I Hospital Day: 2    Assessment/Plan   * Diabetic ketoacidosis associated with type 1 diabetes mellitus (HCC)  Assessment & Plan  Lab Results   Component Value Date    HGBA1C 9.6 (H) 01/14/2024     Recent Labs     05/05/24  0148 05/05/24  0406 05/05/24  0603 05/05/24  0807   POCGLU 93 283* 120 249*   Patient was initially treated in stepdown level 1 for DKA with evidence of hyperglycemia and metabolic acidosis.  This was felt to be due to patient not taking her insulin appropriately in the setting of illness  Anion gap closed  Insulin drip placed on admission, to be transitioned back to basal bolus regimen today with eventual plan to transition to insulin pump per endocrinology  Patient wishes to bring him her own Tresiba from home.  2 hours after receiving that dose, the insulin drip can be discontinued and we will also put her on 8 units of mealtime insulin plus algorithm to sliding scale coverage per endocrine  Due for A1c to be repeated, can be done in am      Blood Sugar Average: Last 72 hrs:  (P) 192.5      Odynophagia  Assessment & Plan  Patient presented with sore throat, tested negative for strep and COVID  Recently treated by ENT with antibiotics  Continue cefdinir    Lupus (HCC)  Assessment & Plan  Continue follow-up with rheumatology  Continue home meds including methotrexate    Hypothyroidism  Assessment & Plan  Continue Synthroid at current dose per endocrinology           VTE Pharmacologic Prophylaxis:   lovenox    Mobility:   Basic Mobility Inpatient Raw Score: 24  JH-HLM Goal: 8: Walk 250 feet or more  JH-HLM Achieved: 7: Walk 25 feet or more  JH-HLM Goal NOT achieved. Continue with multidisciplinary rounding and encourage appropriate mobility to improve upon JH-HLM goals.    Patient Centered Rounds: I performed bedside rounds  "with nursing staff today.   Discussions with Specialists or Other Care Team Provider: endocrine    Education and Discussions with Family / Patient:     Total Time Spent on Date of Encounter in care of patient: 30 mins. This time was spent on one or more of the following: performing physical exam; counseling and coordination of care; obtaining or reviewing history; documenting in the medical record; reviewing/ordering tests, medications or procedures; communicating with other healthcare professionals and discussing with patient's family/caregivers.    Current Length of Stay: 2 day(s)  Current Patient Status: Inpatient   Certification Statement: The patient will continue to require additional inpatient hospital stay due to pending safe transition from insulin drip to basal bolus regimen  Discharge Plan: Anticipate discharge later today or tomorrow to home.    Code Status: Level 1 - Full Code    Subjective:   Patient still with very hoarse voice but says it is getting better.  She is eating and drinking well now.  No fever or abdominal pain.  No nausea, vomiting, diarrhea.  Patient says it was a \"perfect storm\" with all her health issues but she does have the insulin and diabetic equipment she needs at home. Patient is eager to go home today    Objective:     Vitals:   Temp (24hrs), Av.3 °F (36.8 °C), Min:98.1 °F (36.7 °C), Max:98.5 °F (36.9 °C)    Temp:  [98.1 °F (36.7 °C)-98.5 °F (36.9 °C)] 98.4 °F (36.9 °C)  HR:  [90] 90  Resp:  [18] 18  BP: ()/(53-76) 98/53  SpO2:  [98 %] 98 %  Body mass index is 24.29 kg/m².     Input and Output Summary (last 24 hours):     Intake/Output Summary (Last 24 hours) at 2024 0945  Last data filed at 2024 0800  Gross per 24 hour   Intake 759.87 ml   Output --   Net 759.87 ml       Physical Exam:   Physical Exam  Vitals reviewed.   Constitutional:       General: She is not in acute distress.     Appearance: She is not toxic-appearing or diaphoretic.   HENT:      " Mouth/Throat:      Mouth: Mucous membranes are moist.      Pharynx: Oropharynx is clear. No oropharyngeal exudate or posterior oropharyngeal erythema.      Comments: Hoarse voice  Eyes:      General: No scleral icterus.        Right eye: No discharge.         Left eye: No discharge.      Conjunctiva/sclera: Conjunctivae normal.   Cardiovascular:      Rate and Rhythm: Normal rate and regular rhythm.      Heart sounds: No murmur heard.  Pulmonary:      Effort: No respiratory distress.      Breath sounds: No stridor. No wheezing, rhonchi or rales.   Abdominal:      General: There is no distension.      Tenderness: There is no abdominal tenderness. There is no guarding.   Musculoskeletal:      Right lower leg: No edema.      Left lower leg: No edema.   Skin:     General: Skin is warm and dry.      Coloration: Skin is not jaundiced or pale.      Findings: No bruising, erythema, lesion or rash.   Neurological:      Mental Status: She is alert.      Comments: Awake alert interactive          Additional Data:     Labs:  Results from last 7 days   Lab Units 05/04/24  0639   WBC Thousand/uL 7.89   HEMOGLOBIN g/dL 12.2   HEMATOCRIT % 36.6   PLATELETS Thousands/uL 185   SEGS PCT % 27*   LYMPHO PCT % 59*   MONO PCT % 10   EOS PCT % 2     Results from last 7 days   Lab Units 05/05/24  0415 05/04/24  0032 05/03/24  1925   SODIUM mmol/L 136   < > 131*   POTASSIUM mmol/L 3.7   < > 4.2   CHLORIDE mmol/L 103   < > 93*   CO2 mmol/L 21   < > 19*   BUN mg/dL 6   < > 12   CREATININE mg/dL 0.57*   < > 0.79   ANION GAP mmol/L 12   < > 19*   CALCIUM mg/dL 8.4   < > 9.5   ALBUMIN g/dL  --   --  4.3   TOTAL BILIRUBIN mg/dL  --   --  0.87   ALK PHOS U/L  --   --  91   ALT U/L  --   --  20   AST U/L  --   --  26   GLUCOSE RANDOM mg/dL 301*   < > 420*    < > = values in this interval not displayed.         Results from last 7 days   Lab Units 05/05/24  0807 05/05/24  0603 05/05/24  0406 05/05/24  0148 05/04/24  2342 05/04/24  2302 05/04/24  2155  05/04/24  2152 05/04/24  1958 05/04/24  1800 05/04/24  1609 05/04/24  1353   POC GLUCOSE mg/dl 249* 120 283* 93 288* 244* 85 84 172* 177* 138 217*         Results from last 7 days   Lab Units 05/03/24  2146 05/03/24  1925   LACTIC ACID mmol/L 1.6  --    PROCALCITONIN ng/ml  --  0.06       Lines/Drains:  Invasive Devices       Peripheral Intravenous Line  Duration             Peripheral IV 05/03/24 Left;Ventral (anterior) Hand 1 day    Peripheral IV 05/03/24 Right;Ventral (anterior) Forearm 1 day    Peripheral IV 05/04/24 Dorsal (posterior);Left Forearm 1 day                      Imaging:     Recent Cultures (last 7 days):         Last 24 Hours Medication List:   Current Facility-Administered Medications   Medication Dose Route Frequency Provider Last Rate    acetaminophen  975 mg Oral Q8H PRN Tere Barry MD      cefdinir  300 mg Oral Q12H ROQUE Tere Barry MD      enoxaparin  40 mg Subcutaneous Daily Tere Barry MD      fluticasone-vilanterol  1 puff Inhalation Daily Tere Barry MD      folic acid  1 mg Oral Daily Tere Barry MD      gabapentin  600 mg Oral Daily Tere Barry MD      hydroxychloroquine  200 mg Oral BID With Meals Tere Barry MD      insulin regular (HumuLIN R,NovoLIN R) 1 Units/mL in sodium chloride 0.9 % 100 mL infusion  0.3-21 Units/hr Intravenous Titrated Tere Barry MD 4 Units/hr (05/05/24 0815)    levothyroxine  25 mcg Oral Early Morning Tere Barry MD      ondansetron  4 mg Intravenous Q6H PRN Tere Barry MD          Today, Patient Was Seen By: Lila Lynn PA-C    **Please Note: This note may have been constructed using a voice recognition system.**

## 2024-05-05 NOTE — ASSESSMENT & PLAN NOTE
Lab Results   Component Value Date    HGBA1C 9.6 (H) 01/14/2024     Recent Labs     05/05/24  0148 05/05/24  0406 05/05/24  0603 05/05/24  0807   POCGLU 93 283* 120 249*   Patient was initially treated in stepdown level 1 for DKA with evidence of hyperglycemia and metabolic acidosis.  This was felt to be due to patient not taking her insulin appropriately in the setting of illness  Anion gap closed  Insulin drip placed on admission, to be transitioned back to basal bolus regimen today with eventual plan to transition to insulin pump per endocrinology  Patient wishes to bring him her own Tresiba from home.  2 hours after receiving that dose, the insulin drip can be discontinued and we will also put her on 8 units of mealtime insulin plus algorithm to sliding scale coverage per endocrine  Due for A1c to be repeated, can be done in am      Blood Sugar Average: Last 72 hrs:  (P) 192.5

## 2024-05-05 NOTE — PLAN OF CARE
Problem: PAIN - ADULT  Goal: Verbalizes/displays adequate comfort level or baseline comfort level  Description: Interventions:  - Encourage patient to monitor pain and request assistance  - Assess pain using appropriate pain scale  - Administer analgesics based on type and severity of pain and evaluate response  - Implement non-pharmacological measures as appropriate and evaluate response  - Consider cultural and social influences on pain and pain management  - Notify physician/advanced practitioner if interventions unsuccessful or patient reports new pain  Outcome: Progressing     Problem: METABOLIC, FLUID AND ELECTROLYTES - ADULT  Goal: Glucose maintained within target range  Description: INTERVENTIONS:  - Monitor Blood Glucose as ordered  - Assess for signs and symptoms of hyperglycemia and hypoglycemia  - Administer ordered medications to maintain glucose within target range  - Assess nutritional intake and initiate nutrition service referral as needed  Outcome: Progressing

## 2024-05-06 ENCOUNTER — TELEPHONE (OUTPATIENT)
Dept: FAMILY MEDICINE CLINIC | Facility: CLINIC | Age: 29
End: 2024-05-06

## 2024-05-06 ENCOUNTER — TRANSITIONAL CARE MANAGEMENT (OUTPATIENT)
Dept: FAMILY MEDICINE CLINIC | Facility: CLINIC | Age: 29
End: 2024-05-06

## 2024-05-06 LAB — BACTERIA THROAT CULT: ABNORMAL

## 2024-05-06 NOTE — UTILIZATION REVIEW
NOTIFICATION OF INPATIENT ADMISSION   AUTHORIZATION REQUEST   SERVICING FACILITY:   [place] [address]  Tax ID: [id number]  NPI: [id number]   ATTENDING PROVIDER:  Attending Name and NPI#: Morgan Lopez Md [[id number]]  Address: [address] [place] [address]  Phone: [phone]     ADMISSION INFORMATION:  Place of Service: Inpatient [facility]  Place of Service Code: 21  Inpatient Admission Date/Time: 5/3/24  9:28 PM  Discharge Date/Time: 5/5/2024  3:24 PM  Admitting Diagnosis Code/Description:  Fatigue [R53.83]  Hyperglycemia [R73.9]  URI (upper respiratory infection) [J06.9]  Diabetic ketoacidosis without coma associated with type 1 diabetes mellitus (HCC) [E10.10]     UTILIZATION REVIEW CONTACT:  Shira Ruby Utilization   Network Utilization Review Department  Phone: [phone]  Fax: [phone]  Email: [email or web address]  Contact for approvals/pending authorizations, clinical reviews, and discharge.     PHYSICIAN ADVISORY SERVICES:  Medical Necessity Denial & Hgih-dd-Wgvs Review  Phone: [phone]  Fax: [phone]  Email: [email or web address]     DISCHARGE SUPPORT TEAM:  For Patients Discharge Needs & Updates  Phone: [phone] opt. 2 Fax: [phone]  Email: [email or web address]

## 2024-05-07 NOTE — TELEPHONE ENCOUNTER
Pt returning our call. Declined to schedule an appointment stating she was seen for  endocrinology reasons, and ENT reasons, and didn't think it was necessary to come in to see her primary care doctor.

## 2024-05-13 ENCOUNTER — SOCIAL WORK (OUTPATIENT)
Dept: BEHAVIORAL/MENTAL HEALTH CLINIC | Facility: CLINIC | Age: 29
End: 2024-05-13

## 2024-05-13 DIAGNOSIS — F41.1 GENERALIZED ANXIETY DISORDER WITH PANIC ATTACKS: ICD-10-CM

## 2024-05-13 DIAGNOSIS — F31.62 BIPOLAR DISORDER, CURRENT EPISODE MIXED, MODERATE (HCC): ICD-10-CM

## 2024-05-13 DIAGNOSIS — F41.0 GENERALIZED ANXIETY DISORDER WITH PANIC ATTACKS: ICD-10-CM

## 2024-05-13 DIAGNOSIS — F42.2 MIXED OBSESSIONAL THOUGHTS AND ACTS: Primary | ICD-10-CM

## 2024-05-13 DIAGNOSIS — F43.12 CHRONIC POST-TRAUMATIC STRESS DISORDER (PTSD): ICD-10-CM

## 2024-05-13 NOTE — PSYCH
Behavioral Health Psychotherapy Progress Note    Psychotherapy Provided: Individual Psychotherapy     1. Mixed obsessional thoughts and acts        2. Generalized anxiety disorder with panic attacks        3. Chronic post-traumatic stress disorder (PTSD)        4. Bipolar disorder, current episode mixed, moderate (HCC)            Goals addressed in session: Goal 1 and Goal 2     DATA: Met with Jenny individually. 'Things are all over the place'. Discussed trip to [place] as Chris had training; didn't do well in the humidity.  Ongoing fatigue and body issues - feels less than in several opportunities -plans to stop working soon. Disclosed trying to kill herself 2 weeks ago - took Gabapentin and drank alcohol 'but not enough I guess'. Chris came home and she was passed out. Recorded a video letter. Denied SI right now 'I know it was stupid. My depression has just been really bad'. Multiple infections, went into DKA - spent time in ICU. Suggested going to [place] or [place]. Feels very alone, Chris works several hours - they haven't spoken in past 2 months 'barely'. Trying to 'seem well' to Chris 'maybe he will like me more'. Tried going to the gym, tried to read more - vision blurry - called Neuro - MRI last year. Going for Iron and Benlysta infusions. Discussed a stem cell Tx in [facility] for Lupus and Type I.  Chris not supportive. Encouraged to research and become more direct with him if they want her to come. States qualified and needs to remain there for a month. Denied SI  During this session, this clinician used the following therapeutic modalities: Client-centered Therapy, Cognitive Behavioral Therapy, Cognitive Processing Therapy, and Supportive Psychotherapy    Substance Abuse was not addressed during this session. If the client is diagnosed with a co-occurring substance use disorder, please indicate any changes in the frequency or amount of use: . Stage of change for addressing substance use  "diagnoses: No substance use/Not applicable    ASSESSMENT:  Jenny Rodrigues presents with a Depressed mood.     her affect is Flat and Tearful, which is congruent, with her mood and the content of the session. The client has made progress on their goals.     Jenny Rodrigues presents with a low risk of suicide, low risk of self-harm, and low risk of harm to others.    For any risk assessment that surpasses a \"low\" rating, a safety plan must be developed.    A safety plan was indicated: no  If yes, describe in detail     PLAN: Between sessions, Jenny Rodrigues will email [place] for Tx., contact therapist if SI returns At the next session, the therapist will use Client-centered Therapy, Cognitive Behavioral Therapy, Cognitive Processing Therapy, and Supportive Psychotherapy to address above topics.    Behavioral Health Treatment Plan and Discharge Planning: Jenny Rodrigues is aware of and agrees to continue to work on their treatment plan. They have identified and are working toward their discharge goals. yes    Visit start and stop times:    05/13/24  Start Time: 1500  Stop Time: 1555  Total Visit Time: 55 minutes  "

## 2024-05-14 ENCOUNTER — HOSPITAL ENCOUNTER (OUTPATIENT)
Dept: INFUSION CENTER | Facility: CLINIC | Age: 29
Discharge: HOME | End: 2024-05-14
Payer: COMMERCIAL

## 2024-05-14 ENCOUNTER — HOSPITAL ENCOUNTER (OUTPATIENT)
Dept: RADIOLOGY | Facility: HOSPITAL | Age: 29
Discharge: HOME | End: 2024-05-14
Attending: PSYCHIATRY & NEUROLOGY
Payer: COMMERCIAL

## 2024-05-14 ENCOUNTER — HOSPITAL ENCOUNTER (OUTPATIENT)
Dept: RADIOLOGY | Facility: HOSPITAL | Age: 29
Discharge: HOME | End: 2024-05-14
Payer: COMMERCIAL

## 2024-05-14 ENCOUNTER — TELEPHONE (OUTPATIENT)
Dept: NEUROLOGY | Facility: CLINIC | Age: 29
End: 2024-05-14

## 2024-05-14 VITALS
TEMPERATURE: 97.8 F | WEIGHT: 130 LBS | RESPIRATION RATE: 18 BRPM | HEART RATE: 93 BPM | SYSTOLIC BLOOD PRESSURE: 108 MMHG | DIASTOLIC BLOOD PRESSURE: 73 MMHG | BODY MASS INDEX: 24.56 KG/M2 | OXYGEN SATURATION: 98 %

## 2024-05-14 DIAGNOSIS — M32.8 OTHER FORMS OF SYSTEMIC LUPUS ERYTHEMATOSUS, UNSPECIFIED ORGAN INVOLVEMENT STATUS (HCC): Primary | ICD-10-CM

## 2024-05-14 DIAGNOSIS — R13.12 OROPHARYNGEAL DYSPHAGIA: ICD-10-CM

## 2024-05-14 DIAGNOSIS — M32.9 LUPUS (HCC): ICD-10-CM

## 2024-05-14 PROCEDURE — 74220 X-RAY XM ESOPHAGUS 1CNTRST: CPT

## 2024-05-14 PROCEDURE — 74230 X-RAY XM SWLNG FUNCJ C+: CPT

## 2024-05-14 PROCEDURE — 92611 MOTION FLUOROSCOPY/SWALLOW: CPT

## 2024-05-14 RX ORDER — SODIUM CHLORIDE 9 MG/ML
20 INJECTION, SOLUTION INTRAVENOUS ONCE
OUTPATIENT
Start: 2024-06-11

## 2024-05-14 RX ORDER — SODIUM CHLORIDE 9 MG/ML
20 INJECTION, SOLUTION INTRAVENOUS ONCE
Status: COMPLETED | OUTPATIENT
Start: 2024-05-14 | End: 2024-05-14

## 2024-05-14 RX ORDER — DIPHENHYDRAMINE HCL 25 MG
25 TABLET ORAL ONCE
Status: COMPLETED | OUTPATIENT
Start: 2024-05-14 | End: 2024-05-14

## 2024-05-14 RX ORDER — ACETAMINOPHEN 325 MG/1
650 TABLET ORAL ONCE
OUTPATIENT
Start: 2024-06-11

## 2024-05-14 RX ORDER — ACETAMINOPHEN 325 MG/1
650 TABLET ORAL ONCE
Status: COMPLETED | OUTPATIENT
Start: 2024-05-14 | End: 2024-05-14

## 2024-05-14 RX ORDER — DIPHENHYDRAMINE HCL 25 MG
25 TABLET ORAL ONCE
OUTPATIENT
Start: 2024-06-11

## 2024-05-14 RX ADMIN — DIPHENHYDRAMINE HYDROCHLORIDE 25 MG: 25 TABLET ORAL at 15:50

## 2024-05-14 RX ADMIN — SODIUM CHLORIDE 20 ML/HR: 0.9 INJECTION, SOLUTION INTRAVENOUS at 15:48

## 2024-05-14 RX ADMIN — BELIMUMAB 600 MG: 120 INJECTION, POWDER, LYOPHILIZED, FOR SOLUTION INTRAVENOUS at 16:31

## 2024-05-14 RX ADMIN — ACETAMINOPHEN 650 MG: 325 TABLET ORAL at 15:50

## 2024-05-14 NOTE — TELEPHONE ENCOUNTER
Patient is to proceed with evaluation by Ophthalmology and notify Rheumatology.     No other testing/recommendations advised

## 2024-05-14 NOTE — PROGRESS NOTES
Patient is here for benlysta. She states she was recently in the hospital for DKA and was put on antibiotics for URI and bacterial sinusitis. She states her last dose was Friday 5/10/24 morning. She states she no longer has symptoms and denies any fevers. She is afebrile here. Dr Tanner [facility] and [facility]

## 2024-05-14 NOTE — LETTER
[facility]  [address]  [address]  [place] [address]  Dept: [phone]    May 14, 2024     Patient: Jenny Rodrigues   YOB: 1995   Date of Visit: 5/14/2024       To Whom it May Concern:    Jenny Rodrigues is under my professional care. She was seen in the hospital from 5/14/2024 to 05/14/24. She may return to work on 05/15/2024 without limitations.    If you have any questions or concerns, please don't hesitate to call.         Sincerely,          Ambika Parsons RN

## 2024-05-14 NOTE — PROGRESS NOTES
Patient tolerated her treatment without any adverse reactions. Next appointment confirmed 6/11/24 at 1500 at [place]. Patient declined avs

## 2024-05-14 NOTE — PLAN OF CARE
Problem: Knowledge Deficit  Goal: Patient/family/caregiver demonstrates understanding of disease process, treatment plan, medications, and discharge instructions  Description: Complete learning assessment and assess knowledge base.  Interventions:  - Provide teaching at level of understanding  - Provide teaching via preferred learning methods  Outcome: Progressing      20

## 2024-05-14 NOTE — PROCEDURES
Speech Pathology - Modified Barium Swallow Study      Patient Name: Jenny Rodrigues    Today's Date: 5/14/2024     Problem List  Active Problems:  There are no active Hospital Problems.      Past Medical History  Past Medical History:   Diagnosis Date    Abdominal pain     Anaphylaxis     Anxiety     Anxiety and depression     COVID-19 12/2020    Delayed emergence from anesthesia 03/23/2023    Severe episode of emergence delirium (confused, combative) after MAC anesthetic on 03/2023 for EGD. Received versed 2mg, >50mcg precedex, 5mg IV haldol, and 50mcg fentanyl. Waxing and waning episodes of agitation. Any future anesthetics should NOT be done at [facility].    Delirium, induced by drug     anesthesia    Depression     Diabetes mellitus (HCC)     Disease of thyroid gland     Hashimoto    DKA, type 1 (HCC) 05/11/2021    Eating disorder     history of anorexia/bulemia 6154-1893    Food intolerance     Fracture of fibula     R Salter I    Gilbert disease     Hashimoto's disease 02/21/2020    Head injury     Headache(784.0)     Nasal congestion     PTSD (post-traumatic stress disorder)     Rectal bleed     Seizures (HCC)     Type 1 diabetes (HCC)        Past Surgical History  Past Surgical History:   Procedure Laterality Date    COLONOSCOPY      EGD  03/23/2023    KNEE SURGERY Right 07/06/2020    NASAL SEPTOPLASTY W/ TURBINOPLASTY      SINUS SURGERY      SKIN BIOPSY      TURBINOPLASTY N/A 03/22/2021    Procedure: TURBINOPLASTY;  Surgeon: Jn Hidalgo MD;  Location: [facility];  Service: ENT    UPPER GASTROINTESTINAL ENDOSCOPY      WISDOM TOOTH EXTRACTION      WRIST SURGERY      left; Excision of ganglion       Assessment Summary:    Pt presents with overall WFL oropharyngeal swallow. There was one instance of min trace aspiration with thin via straw which was transient (just coating and barely passing blow the VFs), clearing without need for cough. Pt noted to have prolonged transfer with pudding trial which she  described as difficulty moving certain textures within her oral cavity. Stasis was seen with barium tablet in the medial esophagus, ultimately clearing with additional trial of pudding. Note: Images are available for review in PACS as desired.    Recommendations:   Recommended Diet: regular diet and thin liquids   Recommended Form of Medications: whole with liquid, use a bite of food to clear as needed  Aspiration precautions and compensatory swallowing strategies: upright posture  Consider referral to: Pt has f/u scheduled with ENT, discussed weak voice  SLP Dysphagia therapy recommended: No additional ST f/u needed at this time. Please re consult with any new changes or concerns. Considering Lupus dx, monitor symptoms and discuss any changes or progression of dysphagia with physician.     Results Reviewed with: patient       General Information;  Pt is a 29 y.o. female with a PMH remarkable for type 1 diabetes, Lupus, GERD, and dysphagia. Pt was recently hospitalized at [facility] (5/3-5/5) with the following noted from d/c note on 5/5/24:  Hospital Course:   Jenny Rodrigues is a 29 y.o. female patient with underlying history of type I diabetes and lupus who originally presented to the hospital on 5/3/2024 due to hyperglycemia and anion gap acidosis consistent with DKA.  She was initially treated in the intensive care unit on DKA protocol insulin drip and IV fluids.  She had not been taking her insulin regularly because her oral intake was decreased in the setting of severe sore throat.  She did test negative for strep, and clinically improved over the course of her hospitalization.  She was transitioned back to basal bolus regimen at the recommendation of endocrinology and was stable for discharge home today.  Pt was assessed by OP SLP 4/26/24 where swallowing appeared WFL but MBS was recommended to further assess oropharyngeal stage swallowing skills at this time.       Pt was viewed sitting upright in the lateral  and AP positions. Trials administered were consistent with [facility] Validated Protocol: Pt was given 5-mL thin liquid x2, 20-mL cup sip thin, 40-mL sequential swallow thin, 5-mL nectar thick, 20-mL cup sip nectar thick, 40-mL sequential swallow nectar thick, 5-mL honey thick, 5-mL pudding, ½ cookie coated with 3-mL pudding, 5-mL nectar thick in the AP position, and 5-mL pudding in the AP position. Pt was also given thin liquids by straw, as well as a barium tablet with thin liquid/pudding.     Initial view observations/comments: Clear view of the upper airway      8-Point Penetration-Aspiration Scale   Thin liquid 1 - Material does not enter the airway - There was an instance of min trace aspiration with thin via straw which was transient, clearing without need for cough. This is not scored on MBSIMP.   Nectar thick liquid 1 - Material does not enter the airway   Honey thick liquid 1 - Material does not enter the airway   Puree (pudding) 1 - Material does not enter the airway   Solid 1 - Material does not enter the airway     Aspiration Response and Efficacy: There was an instance of min trace aspiration with thin via straw which was transient, clearing without need for cough. This is not scored on MBSIMP as straw sips are not included in the profile.     MBS [facility] Rating    ORAL Impairment  Compinent 1--Lip Closure  Judged at any point during the swallow.  0 - No labial escape    Component 2--Tongue Control During Bolus Hold  Judged on held liquid boluses only and prior to productive tongue movement.   1 - Escape to lateral buccal cavity/floor of mouth (FOM)    Component 3--Bolus Preparation/Mastication  Judged only during presentation of 1/2 shortbread cookie coated in pudding.   1 - Slow prolonged chewing/mashing with complete re-collection    Component 4--Bolus Transport/Lingual Motion  Judged after first productive tongue movement for oral bolus transport.  2 - Slowed tongue motion     Component 5--Oral  Residue  Judged after first swallow or after the last swallow of the sequential swallow task.  3 - Majority of bolus remaining (piecemeal swallow of solids)   Location   B - Palate and C - Tongue    Component 6--Initiation of Pharyngeal Swallow  Judged at first movement of the brisk superior-anterior hyoid trajectory.  1 - Bolus Head in valleculae (spill to pyriforms with straw sip, straw trial not scored on MBSIMP)      PHARYNGEAL Impairment  Component 7--Soft Palate Elevation  Judged during maximum displacement of soft palate.  0 - No bolus between the soft palate (SP)/pharyngeal wall (PW)    Component 8--Laryngeal Elevation  Judged when epiglottis is in its most horizontal position.  0 - Complete superior movement of thyroid cartilage with complete approximation of arytenoids to epiglottic petiole    Component 9--Anterior Hyoid Excursion  Judged at height of swallow/maximal anterior hyoid displacement.  0 - Complete anterior movement    Component 10--Epiglottic Movement  Judged at height of swallow/maximal anterior hyoid displacement.  0 - Complete inversion    Component 11--Laryngeal Vestibular Closure  Judged at height of swallow/maximal anterior hyoid displacement.  0 - Complete; no air/contrast in laryngeal vestibule - There was an instance of min trace aspiration with thin via straw which was transient, clearing without need for cough. This is not scored on MBSIMP as straw sips are not included in the profile.     Component 12--Pharyngeal Stripping Wave  Judged during the full duration of the pharyngeal swallow.  0 - Present - complete    Component 13--Pharyngeal Contraction  Judged in AP view at rest and throughout maximum movement of structures.  0 - Complete    Component 14--Pharyngoesophageal Segment Opening  Judged during maximum distension of PES and throughout opening and closure.  1 - Partial distension/partial duration; partial obstruction to flow    Component 15--Tongue Base (TB) Retraction  Judged  during maximum retraction of the tongue base.  1 - Trace column of contrast or air between TB and PW    Component 16--Pharyngeal Residue  Judged after first swallow or after the last swallow of the sequential swallow task.  1 - Trace residue within or on pharyngeal structures   Location   B - Valleculae and E - Pyriform sinuses      ESOPHAGEAL Impairment  Component 17--Esophageal Clearance Upright Position  Judged in AP view during bolus transit through the oral cavity to the LES  2 - Esophageal retention with retrograde flow below pharyngoesophageal segment (PES)

## 2024-05-14 NOTE — TELEPHONE ENCOUNTER
" 5/13 at 9:10 am:    My name is Jenny Rodrigues. Birthdate, February, 4th, 1995, phone number [phone]. I'm calling because I woke up Friday with really bad vision. Very blurry and unable to focus well. I don't know if, because I'm having some other neurological issues recently. If going to see Dr. Espitia for this would be the right move. or if I should go see a different type of doctor. If you could please call me back, I would really appreciate it since I have very good vision my whole life.  __________________________________________________________________    Called pt back to get more information. Pt states that on Friday morning she woke up with blurry vision. Both eyes are affected. Has a hard time focusing on an object. Describes it as if you were trying to take a photo with your cell phone, and the item in the camera lens appears blurry until it focuses. States that she did have a headache the week leading up to the vision issue, but denies a headache from Friday until today. Denies dizziness. Still has the blurry vision today. Notes that it is worse in the morning, and it does improve as the day goes on, but does not completely go away. Pt did not contact any other providers regarding this issue yet. Does see an ophthalmologist annually for an eye exam, but does not wear corrective lenses. Pt still experiences the \"electrical shock\" sensations that involves the upper and lower extremities, as pt described at the  on 4/19/24. Routed to provider to advise.  "

## 2024-05-14 NOTE — LETTER
[facility]  [address]  [address]  [place] [address]  Dept: [phone]    May 14, 2024     Patient: Jenny Rodrigues   YOB: 1995   Date of Visit: 5/14/2024       To Whom it May Concern:    Jenny Rodrigues is under my professional care. She was seen in the hospital from 5/14/2024 to 05/14/24. She may return to work on 05/15/2024 without limitations.    If you have any questions or concerns, please don't hesitate to call.         Sincerely,          Ambika Parsons RN

## 2024-05-15 NOTE — UTILIZATION REVIEW
NOTIFICATION OF ADMISSION DISCHARGE   This is a Notification of Discharge from [facility]. Please be advised that this patient has been discharge from our facility. Below you will find the admission and discharge date and time including the patient’s disposition.   UTILIZATION REVIEW CONTACT:  Shira Ruby  Utilization   Network Utilization Review Department  Phone: [phone] carefully listen to the prompts. All voicemails are confidential.  Email: [email or web address]     ADMISSION INFORMATION  PRESENTATION DATE: 5/3/2024  6:30 PM  OBERVATION ADMISSION DATE:   INPATIENT ADMISSION DATE: 5/3/24  9:28 PM   DISCHARGE DATE: 5/5/2024  3:24 PM   DISPOSITION:Home/Self Care    Network Utilization Review Department  ATTENTION: Please call with any questions or concerns to [phone] and carefully listen to the prompts so that you are directed to the right person. All voicemails are confidential.   For Discharge needs, contact Care Management DC Support Team at [phone] opt. [phone]  Send all requests for admission clinical reviews, approved or denied determinations and any other requests to dedicated fax number below belonging to the campus where the patient is receiving treatment. List of dedicated fax numbers for the Facilities:  FACILITY NAME UR FAX NUMBER   ADMISSION DENIALS (Administrative/Medical Necessity) [phone]   DISCHARGE SUPPORT TEAM ([facility]) [phone]   PARENT CHILD HEALTH (Maternity/NICU/Pediatrics) [phone]   [facility] [phone]   [facility] [phone]   [facility] [phone]   [facility] [phone]   [facility] [phone]   [facility] [phone]   [facility] [phone]   [facility] [phone]   [facility]  [facility] [phone]   [facility] [phone]   [facility] [phone]   [facility] [phone]

## 2024-05-16 LAB
LEFT EYE DIABETIC RETINOPATHY: NORMAL
RIGHT EYE DIABETIC RETINOPATHY: NORMAL

## 2024-05-22 ENCOUNTER — SOCIAL WORK (OUTPATIENT)
Dept: BEHAVIORAL/MENTAL HEALTH CLINIC | Facility: CLINIC | Age: 29
End: 2024-05-22

## 2024-05-22 DIAGNOSIS — F31.62 BIPOLAR DISORDER, CURRENT EPISODE MIXED, MODERATE (HCC): ICD-10-CM

## 2024-05-22 DIAGNOSIS — F41.0 GENERALIZED ANXIETY DISORDER WITH PANIC ATTACKS: ICD-10-CM

## 2024-05-22 DIAGNOSIS — F42.2 MIXED OBSESSIONAL THOUGHTS AND ACTS: Primary | ICD-10-CM

## 2024-05-22 DIAGNOSIS — F43.12 CHRONIC POST-TRAUMATIC STRESS DISORDER (PTSD): ICD-10-CM

## 2024-05-22 DIAGNOSIS — F41.1 GENERALIZED ANXIETY DISORDER WITH PANIC ATTACKS: ICD-10-CM

## 2024-05-22 NOTE — PSYCH
Behavioral Health Psychotherapy Progress Note    Psychotherapy Provided: Individual Psychotherapy     1. Mixed obsessional thoughts and acts        2. Generalized anxiety disorder with panic attacks        3. Chronic post-traumatic stress disorder (PTSD)        4. Bipolar disorder, current episode mixed, moderate (HCC)            Goals addressed in session: Goal 1 and Goal 2     DATA: Met with Jenny individually. Medical concerns remains primary stressor. 's feel Lupus may be a heavy influence upon severe depression and anxiety. Experienced a panic attack on the way to [place] - sitting on the bridge and 'came out of no where'. No concerns with heights, water etc. 'I just became over stimulated I think with the traffic maybe'. Chris provides minimal support. His parents and sister coming to [place] as parents are moving sister top a college in [place] - Jenny is laying down ground rules for sister to visit home. Depression has been 'really bad'. Denied SI . Anxiety overwhelming - cries daily but 'gets though'. Work also a trigger - having difficulty comprehending a program though has experience - writes self notes but must return to them each time - other staff bring mistakes to Jenny's attention 'I feel stupid'. Emailing back and forth with Franca Patel About Lupus and /or  Diabetes clinical trials. Dog also experiencing medical concerns - adrenal tumors. Distraught over this as well. Currently not on any Antidepressives, all have had side effects. Denied SI  During this session, this clinician used the following therapeutic modalities: Client-centered Therapy, Cognitive Behavioral Therapy, Cognitive Processing Therapy, and Supportive Psychotherapy    Substance Abuse was not addressed during this session. If the client is diagnosed with a co-occurring substance use disorder, please indicate any changes in the frequency or amount of use: . Stage of change for addressing substance use diagnoses: No substance  "use/Not applicable    ASSESSMENT:  Jenny Rodrigues presents with a Anxious and Depressed mood.     her affect is Flat and Tearful, which is congruent, with her mood and the content of the session. The client has made progress on their goals.     Jenny Rodrigues presents with a low risk of suicide, low risk of self-harm, and low risk of harm to others.    For any risk assessment that surpasses a \"low\" rating, a safety plan must be developed.    A safety plan was indicated: no  If yes, describe in detail     PLAN: Between sessions, Jenny Rodrigues will wait on response from [place], . At the next session, the therapist will use Client-centered Therapy, Cognitive Behavioral Therapy, Cognitive Processing Therapy, and Supportive Psychotherapy to address above topics.    Behavioral Health Treatment Plan and Discharge Planning: Jenny Rodrigues is aware of and agrees to continue to work on their treatment plan. They have identified and are working toward their discharge goals. yes    Visit start and stop times:    05/22/24  Start Time: 0900  Stop Time: 0955  Total Visit Time: 55 minutes  "

## 2024-05-24 ENCOUNTER — OFFICE VISIT (OUTPATIENT)
Dept: PODIATRY | Facility: CLINIC | Age: 29
End: 2024-05-24
Payer: COMMERCIAL

## 2024-05-24 VITALS — WEIGHT: 130 LBS | HEIGHT: 61 IN | BODY MASS INDEX: 24.55 KG/M2

## 2024-05-24 DIAGNOSIS — B07.0 PLANTAR WART: Primary | ICD-10-CM

## 2024-05-24 PROCEDURE — 17110 DESTRUCTION B9 LES UP TO 14: CPT | Performed by: PODIATRIST

## 2024-05-24 NOTE — PROGRESS NOTES
The patient presents for wart treatment.  Pain resolved.  No new complaint.  Two small lesions with minimal keratosis and punctate bleeding in left great toe and arch.      Discussed options and continue chemical cauterization.  Verbal consent was obtained from the patient. All the verrucoid lesion(s) were then cauterized with Cantharidin.  An occlusive dressing was applied to the areas.  Instructed to remove the dressing tonight. Instruction was given for possible local care.  The patient tolerated the procedure well and without complications.  The patient will return in 3 weeks for follow-up.    Lesion Destruction    Date/Time: 5/24/2024 8:00 AM    Performed by: Andrew Mariee DPM  Authorized by: Andrew Mariee DPM  Universal Protocol:  Consent: Verbal consent obtained.  Risks and benefits: risks, benefits and alternatives were discussed  Consent given by: patient  Timeout called at: 5/24/2024 8:19 AM.  Patient understanding: patient states understanding of the procedure being performed  Patient identity confirmed: verbally with patient    Procedure Details - Lesion Destruction:     Number of Lesions:  2  Lesion 1:     Body area:  Lower extremity    Lower extremity location:  L foot    Malignancy: benign lesion      Destruction method: chemical removal    Lesion 2:     Body area:  Lower extremity    Lower extremity location:  L big toe    Malignancy: benign lesion      Destruction method: chemical removal

## 2024-05-28 DIAGNOSIS — M35.9 UNDIFFERENTIATED CONNECTIVE TISSUE DISEASE (HCC): ICD-10-CM

## 2024-05-28 RX ORDER — FOLIC ACID 1 MG/1
1000 TABLET ORAL DAILY
Qty: 90 TABLET | Refills: 3 | Status: SHIPPED | OUTPATIENT
Start: 2024-05-28

## 2024-06-03 PROBLEM — E10.10 DIABETIC KETOACIDOSIS ASSOCIATED WITH TYPE 1 DIABETES MELLITUS (HCC): Status: RESOLVED | Noted: 2021-05-11 | Resolved: 2024-06-03

## 2024-06-06 ENCOUNTER — TELEPHONE (OUTPATIENT)
Dept: PSYCHIATRY | Facility: CLINIC | Age: 29
End: 2024-06-06

## 2024-06-06 NOTE — TELEPHONE ENCOUNTER
Writer contacted patient and informed her that her 6/7 apt has been cancelled due to provider being out of office. Next apt is 6/20

## 2024-06-07 ENCOUNTER — OFFICE VISIT (OUTPATIENT)
Dept: ENDOCRINOLOGY | Facility: CLINIC | Age: 29
End: 2024-06-07
Payer: COMMERCIAL

## 2024-06-07 VITALS
BODY MASS INDEX: 23.56 KG/M2 | OXYGEN SATURATION: 99 % | WEIGHT: 124.8 LBS | HEIGHT: 61 IN | HEART RATE: 64 BPM | DIASTOLIC BLOOD PRESSURE: 70 MMHG | SYSTOLIC BLOOD PRESSURE: 110 MMHG

## 2024-06-07 DIAGNOSIS — E10.649 UNCONTROLLED TYPE 1 DIABETES MELLITUS WITH HYPOGLYCEMIA WITHOUT COMA (HCC): ICD-10-CM

## 2024-06-07 DIAGNOSIS — E78.5 HYPERLIPIDEMIA, UNSPECIFIED HYPERLIPIDEMIA TYPE: ICD-10-CM

## 2024-06-07 DIAGNOSIS — E10.65 TYPE 1 DIABETES MELLITUS WITH HYPERGLYCEMIA (HCC): Primary | ICD-10-CM

## 2024-06-07 DIAGNOSIS — E06.3 HASHIMOTO'S THYROIDITIS: ICD-10-CM

## 2024-06-07 LAB — SL AMB POCT HEMOGLOBIN AIC: 10.5 (ref ?–6.5)

## 2024-06-07 PROCEDURE — 99214 OFFICE O/P EST MOD 30 MIN: CPT

## 2024-06-07 PROCEDURE — 83036 HEMOGLOBIN GLYCOSYLATED A1C: CPT

## 2024-06-07 RX ORDER — INSULIN DEGLUDEC 100 U/ML
32 INJECTION, SOLUTION SUBCUTANEOUS DAILY
Qty: 15 ML | Refills: 2 | Status: SHIPPED | OUTPATIENT
Start: 2024-06-07

## 2024-06-07 NOTE — PROGRESS NOTES
Established Patient Progress Note    CC: Follow up for type 1 diabetes mellitus    Impression & Plan:    Problem List Items Addressed This Visit       Uncontrolled type 1 diabetes mellitus with hypoglycemia without coma (HCC)    Relevant Medications    Tresiba FlexTouch 100 units/mL injection pen    Other Relevant Orders    POCT hemoglobin A1c (Completed)    Basic metabolic panel    Lipid panel    Hemoglobin A1C    Hashimoto's thyroiditis     TSH stable 1 month ago (4.2).  - continue current regimen  - continue to monitor         Hyperlipidemia     Update prior to next appointment. Order given         Relevant Orders    Lipid panel    Type 1 diabetes mellitus with hyperglycemia (HCC) - Primary     Poorly controlled at this time. She has been sick on and off. Most recent DKA admission on 5/3/24 related to poor po intake and not taking bolus insulin. She has also been struggling with her mental health. She is seeing therapist and psychiatrist weekly  - for now, continue with current regimen. Suggested to patient to keep pump on while sick so that she is getting her insulin continuously but reports issues with mental health that prevents her from doing this. Also advised to take 1/2 dose of novolog if not eating as opposed to skipping altogether given history of DKA/hyperglycemia in the past   - she will reach out with any issues or concerns  - follow up in 3 months    Lab Results   Component Value Date    HGBA1C 10.5 (A) 06/07/2024            Relevant Medications    Tresiba FlexTouch 100 units/mL injection pen       Orders Placed This Encounter   Procedures    Basic metabolic panel     This is a patient instruction: Patient fasting for 8 hours or longer recommended.     Standing Status:   Future     Standing Expiration Date:   6/7/2025    Lipid panel     This is a patient instruction: This test requires patient fasting for 10-12 hours or longer. Drinking of black coffee or black tea is acceptable.     Standing Status:    Future     Standing Expiration Date:   6/7/2025    Hemoglobin A1C     Standing Status:   Future     Standing Expiration Date:   6/7/2025    POCT hemoglobin A1c       History of Present Illness:     Jenny Rodrigues is a 29 y.o. female with a history of type 1 diabetes, hypothyroidism, hyperlipidemia. Complications:  none. Last A1C was 10.5. Home glucose monitoring: not currently using CGM or pump. No BG log to review    Recent hospitalizations or illnesses: yes- 5/3/24 for DKA secondary to poor po intake due to sore throat and not taking novolog.    She is using injections at the moment. She reports she does not like to wear equipment when she is sick. She was sick twice in May and is sick again today. She has also been struggling with lupus flares.   Note: she does not want to upgrade to Dexcom G7- her mother has it and she does not like it.     Current regimen:   Novolog ICR: 6g and ISF 50  Tresiba 32 units daily    Hypothyroidism: taking 25 mcg daily    Eye exam: UTD  Foot exam: UTD    Patient Active Problem List   Diagnosis    Patellofemoral pain syndrome of right knee    Abnormal liver function test    Right sided abdominal pain    Chronic fatigue and malaise    Uncontrolled type 1 diabetes mellitus with hypoglycemia without coma (HCC)    Abnormal stools    Urinary frequency    Concussion without loss of consciousness    Nausea and vomiting    Agitation    Chronic post-traumatic stress disorder (PTSD)    Generalized anxiety disorder with panic attacks    Activity intolerance    OCD (obsessive compulsive disorder)    Chronic pain of right knee    Chronic abdominal pain    Blood in the stool    Visual changes    Hashimoto's thyroiditis    Dysuria    Paresthesia of left leg    Vitamin D deficiency    Neuropathy    Polyarthritis    Muscle weakness    Word finding difficulty    Insulin pump status    Secondary amenorrhea    Chronic back pain    Syncope    Vulvodynia    Yeast infection    Bipolar affective disorder  (HCC)    Odynophagia    Weakness generalized    Low serum vitamin B12    Arthralgia    Positive SOHAM (antinuclear antibody)    Seizures (HCC)    History of anesthesia complications    Rheumatoid factor positive    Hyperlipidemia    Fatty stools    Left low back pain    Visual hallucinations    Elevated prolactin level    Type 1 diabetes mellitus (HCC)    Nail abnormality    Verruca    Hypothyroidism    Physical deconditioning    Right-sided back pain    Other chest pain    Gastroesophageal reflux disease    Constipation    Bruising    Type 1 diabetes mellitus with hyperglycemia (HCC)    Controlled diabetes mellitus type 1 with complications (HCC)    Delayed emergence from anesthesia    Hearing loss of right ear    Undifferentiated connective tissue disease (HCC)    Pigmentation abnormality of skin    Idiopathic hypotension    Other forms of systemic lupus erythematosus (HCC)    Pre-conception counseling    Uncontrolled type 1 diabetes mellitus with hyperglycemia (HCC)    Lupus (HCC)    Left hemiparesis (HCC)    Vestibular migraine    Cognitive attention deficit    Lhermitte's sign positive    Paresthesia    Tremor    Oropharyngeal dysphagia      Past Medical History:   Diagnosis Date    Abdominal pain     Anaphylaxis     Anxiety     Anxiety and depression     COVID-19 12/2020    Delayed emergence from anesthesia 03/23/2023    Severe episode of emergence delirium (confused, combative) after MAC anesthetic on 03/2023 for EGD. Received versed 2mg, >50mcg precedex, 5mg IV haldol, and 50mcg fentanyl. Waxing and waning episodes of agitation. Any future anesthetics should NOT be done at [facility].    Delirium, induced by drug     anesthesia    Depression     Diabetes mellitus (HCC)     Disease of thyroid gland     Hashimoto    DKA, type 1 (HCC) 05/11/2021    Eating disorder     history of anorexia/bulemia [id number]    Food intolerance     Fracture of fibula     R Salter I    Gilbert disease     Hashimoto's disease 02/21/2020     "Head injury     Headache(784.0)     Nasal congestion     PTSD (post-traumatic stress disorder)     Rectal bleed     Seizures (HCC)     Type 1 diabetes (HCC)       Past Surgical History:   Procedure Laterality Date    COLONOSCOPY      EGD  03/23/2023    KNEE SURGERY Right 07/06/2020    NASAL SEPTOPLASTY W/ TURBINOPLASTY      SINUS SURGERY      SKIN BIOPSY      TURBINOPLASTY N/A 03/22/2021    Procedure: TURBINOPLASTY;  Surgeon: Jn Hidalgo MD;  Location: [facility];  Service: ENT    UPPER GASTROINTESTINAL ENDOSCOPY      WISDOM TOOTH EXTRACTION      WRIST SURGERY      left; Excision of ganglion      Family History   Problem Relation Age of Onset    Hypertension Mother     Migraines Mother         Headache    Diabetes type II Mother     Varicose Veins Mother     Hyperlipidemia Mother     Diabetes Mother     Arthritis Mother     Depression Mother     Hearing loss Mother     Anxiety disorder Mother     Eczema Father     Cholelithiasis Father     Hypertension Father     Sarcoidosis Father         Liver    Hyperlipidemia Father     Diabetes Father     Coronary artery disease Father     Nephrolithiasis Father     Cirrhosis Father     Alcohol abuse Father     Thyroid disease Sister     Hashimoto's thyroiditis Sister     Alcohol abuse Brother     Cancer Family     Diabetes Family     Hypertension Family      Social History     Tobacco Use    Smoking status: Never     Passive exposure: Never    Smokeless tobacco: Never    Tobacco comments:     Tobacco smoke exposure (Father smokes cigars)   Substance Use Topics    Alcohol use: Yes     Comment: rarely     Allergies   Allergen Reactions    Insulin Glargine Other (See Comments)     LANTUS BRAND -Burning and redness under skin          Current Outpatient Medications:     acetaminophen (TYLENOL) 325 mg tablet, Take 3 tablets (975 mg total) by mouth every 8 (eight) hours as needed for mild pain or headaches, Disp: , Rfl:     BD Insulin Syringe U/F 31G X 5/16\" 0.5 ML MISC, Use " as directly with weekly methotrexate injection., Disp: 100 each, Rfl: 0    Belimumab (BENLYSTA IV), Inject into a catheter in a vein every month to absorb continually Switching to monthly on 12/5, Disp: , Rfl:     clindamycin (CLEOCIN T) 1 % lotion, Apply 1 application. topically 2 (two) times a day, Disp: , Rfl:     Cyanocobalamin 1000 MCG SUBL, Place 1 tablet (1,000 mcg total) under the tongue daily, Disp: 90 tablet, Rfl: 0    famotidine (PEPCID) 40 MG tablet, Take 1 tablet (40 mg total) by mouth daily at bedtime, Disp: 90 tablet, Rfl: 3    Fluticasone-Salmeterol (Advair) 250-50 mcg/dose inhaler, Inhale 1 puff 2 (two) times a day Rinse mouth after use., Disp: 60 blister, Rfl: 0    folic acid (FOLVITE) 1 mg tablet, TAKE 1 TABLET BY MOUTH EVERY DAY, Disp: 90 tablet, Rfl: 3    gabapentin (Neurontin) 600 MG tablet, Take 1 tablet (600 mg total) by mouth daily, Disp: 90 tablet, Rfl: 1    Glucagon (Gvoke HypoPen 2-Pack) 1 MG/0.2ML SOAJ, 1 mg PRN for severe hypoglycemia, Disp: 0.4 mL, Rfl: 0    glucagon 1 MG injection, 1 mg, Disp: , Rfl:     Glucagon HCl (Glucagon Emergency) 1 MG/ML SOLR, , Disp: , Rfl:     hydroxychloroquine (PLAQUENIL) 200 mg tablet, Take 1 tablet (200 mg total) by mouth 2 (two) times a day with meals, Disp: 180 tablet, Rfl: 3    Insulin Pen Needle (BD PEN NEEDLE NASIM U/F) 32G X 4 MM MISC, by Does not apply route 4 (four) times a day for 180 days, Disp: 400 each, Rfl: 3    levonorgestrel-ethinyl estradiol (NORDETTE) 0.15-30 MG-MCG per tablet, Take 1 tablet by mouth daily, Disp: 84 tablet, Rfl: 1    levothyroxine 25 mcg tablet, Take 1 tablet (25 mcg total) by mouth daily, Disp: 60 tablet, Rfl: 0    methotrexate 50 MG/2ML injection, Inject 0.8 mL (20 mg total) under the skin once a week, Disp: 8 mL, Rfl: 4    NovoLOG 100 UNIT/ML injection, Up to 100 units per day with insulin pump, Disp: 90 mL, Rfl: 3    Tresiba FlexTouch 100 units/mL injection pen, Inject 32 Units under the skin daily, Disp: 15 mL, Rfl:  "2    Insulin Disposable Pump (Omnipod 5 G6 Pod, Gen 5,) MISC, , Disp: , Rfl:     Review of Systems   Constitutional:  Negative for chills and fever.   HENT:  Negative for ear pain and sore throat.    Eyes:  Negative for pain and visual disturbance.   Respiratory:  Negative for cough and shortness of breath.    Cardiovascular:  Negative for chest pain and palpitations.   Gastrointestinal:  Negative for abdominal pain and vomiting.   Genitourinary:  Negative for dysuria and hematuria.   Musculoskeletal:  Negative for arthralgias and back pain.   Skin:  Negative for color change and rash.   Neurological:  Negative for seizures and syncope.   All other systems reviewed and are negative.      Physical Exam:  Body mass index is 23.58 kg/m².  /70   Pulse 64   Ht 5' 1\" (1.549 m)   Wt 56.6 kg (124 lb 12.8 oz)   SpO2 99%   BMI 23.58 kg/m²    Wt Readings from Last 3 Encounters:   06/07/24 56.6 kg (124 lb 12.8 oz)   05/30/24 59 kg (130 lb)   05/24/24 59 kg (130 lb)       Physical Exam  Vitals reviewed.   Constitutional:       Appearance: Normal appearance.   HENT:      Head: Normocephalic and atraumatic.   Cardiovascular:      Rate and Rhythm: Normal rate.   Pulmonary:      Effort: Pulmonary effort is normal.   Neurological:      Mental Status: She is alert and oriented to person, place, and time.   Psychiatric:         Mood and Affect: Mood normal.         Behavior: Behavior normal.       Diabetic Foot Exam    Labs:   Lab Results   Component Value Date    HGBA1C 10.5 (A) 06/07/2024    HGBA1C 9.6 (H) 01/14/2024    HGBA1C 8.7 (H) 10/20/2023     Lab Results   Component Value Date    CREATININE 0.57 (L) 05/05/2024    CREATININE 0.54 (L) 05/04/2024    CREATININE 0.57 (L) 05/04/2024    BUN 6 05/05/2024    K 3.7 05/05/2024     05/05/2024    CO2 21 05/05/2024     GFR, Calculated   Date Value Ref Range Status   12/29/2020 69 >60 mL/min/1.73m2 Final     Comment:     mL/min per 1.73 square meters                          "                   Normal Function or Mild Renal    Disease (if clinically at risk):  >or=60  Moderately Decreased:                30-59  Severely Decreased:                  15-29  Renal Failure:                         <15                                            -American GFR: multiply reported GFR by 1.16    Please note that the eGFR is based on the CKD-EPI calculation, and is not intended to be used for drug dosing.     eGFRcr   Date Value Ref Range Status   07/09/2023 125 >59 Final     eGFR   Date Value Ref Range Status   05/05/2024 127 ml/min/1.73sq m Final     Lab Results   Component Value Date    HDL 51 02/02/2023    TRIG 72 02/02/2023     Lab Results   Component Value Date    ALT 20 05/03/2024    AST 26 05/03/2024    GGT 10 06/26/2019    ALKPHOS 91 05/03/2024     Lab Results   Component Value Date    QWJ6GNRPHNYP 4.241 05/03/2024    ERB5MTWZJJTJ 2.490 01/14/2024    OPK8OEAZZNLR 0.926 10/20/2023     Lab Results   Component Value Date    FREET4 1.10 07/16/2021         There are no Patient Instructions on file for this visit.      Discussed with the patient and all questioned fully answered. She will call me if any problems arise.

## 2024-06-07 NOTE — ASSESSMENT & PLAN NOTE
Poorly controlled at this time. She has been sick on and off. Most recent DKA admission on 5/3/24 related to poor po intake and not taking bolus insulin. She has also been struggling with her mental health. She is seeing therapist and psychiatrist weekly  - for now, continue with current regimen. Suggested to patient to keep pump on while sick so that she is getting her insulin continuously but reports issues with mental health that prevents her from doing this. Also advised to take 1/2 dose of novolog if not eating as opposed to skipping altogether given history of DKA/hyperglycemia in the past   - she will reach out with any issues or concerns  - follow up in 3 months    Lab Results   Component Value Date    HGBA1C 10.5 (A) 06/07/2024

## 2024-06-10 ENCOUNTER — VBI (OUTPATIENT)
Dept: ADMINISTRATIVE | Facility: OTHER | Age: 29
End: 2024-06-10

## 2024-06-10 NOTE — PROGRESS NOTES
Assessment and Plan:   Ms. Rodrigues is a 29-year-old female with history significant for type 1 insulin-dependent diabetes mellitus diagnosed in 2019 and Hashimoto's thyroiditis diagnosed in 2020 who presents for a follow-up of an undifferentiated connective tissue disorder (diagnosed based on a positive SOHAM 1:80 nuclear, speckled, homogeneous patterns, arthralgias, malar rash/photosensitivity).  She is currently on hydroxychloroquine 200 mg twice daily on the weekdays and once daily on the weekend that was started in July 2021, injectable methotrexate 20 mg once weekly and Benlysta infusions 10 mg/kg every 4 weeks that was added in September 2023.    # Undifferentiated connective tissue disease  - Jenny presents today for a follow-up of an undifferentiated connective tissue disease for which she is currently on hydroxychloroquine 200 mg twice daily on the weekdays and 200 mg once daily on the weekend, subcutaneous methotrexate 20 mg once weekly and Benlysta infusions on a monthly basis.  She reports with consistent use of the Benlysta this has significantly helped her overall pain level and she is currently denying any concerning joint pains, swelling or stiffness.  The addition of gabapentin that she is taking at 600 mg nightly could also be helping and this has also benefited the neuropathy symptoms in her lower legs so it would be continued.  She overall appears stable from a connective tissue disease perspective and the same medications will be continued unchanged.  She is due to update her monitoring labs and will follow-up with ophthalmology for annual eye exams.    - In regards to the recurrent upper respiratory tract infections that she has been experiencing it is unclear if the frequency has increased with her being on immunosuppression, as she has dealt with these infections since her childhood.  We discussed a monitoring approach for now and she will track the number and types of infection she is  experiencing before any changes to immunosuppression may be warranted.    - Of note she is aware that methotrexate is teratogenic and I recommend discontinuing this 3 months prior to planning conception.  We also discussed there is insufficient data on the safety of belimumab during pregnancy and around the time of pregnancy planning we will need to have a discussion regarding the risks and benefits.  In the meanwhile she will continue with dual contraception including birth control pills and barrier contraception.        Plan:  Diagnoses and all orders for this visit:    Undifferentiated connective tissue disease (HCC)  -     C4 complement; Future  -     C3 complement; Future  -     Protein / creatinine ratio, urine; Future  -     Anti-DNA antibody, double-stranded; Future    Methotrexate, long term, current use    Long-term use of Plaquenil    Long-term use of immunosuppressant medication    Hashimoto's disease    Type 1 diabetes mellitus without complication (HCC)    Fibromyalgia      Activities as tolerated.   Exercise: try to maintain a low impact exercise regimen as much as possible.   Continue other medications as prescribed by PCP and other specialists.       RTC in 4 months.        HPI    INITIAL VISIT NOTE (6/2021):  Ms. Rodrigues is a 26-year-old female with history significant for type 1 insulin-dependent diabetes mellitus diagnosed in 2019 and Hashimoto's thyroiditis (elevated thyroid microsomal and thyroglobulin antibodies) diagnosed in 2020, who presents for further evaluation of a positive SOHAM 1:80 nuclear, speckled, homogeneous patterns and rheumatoid factor of 10, in addition to widespread joint pains.  She is referred by MARGARET Sparks for a rheumatology consult.       Patient reports she started to feel unwell approximately 2 years ago and further evaluation for this led to the diagnosis of type 1 diabetes as well as the Hashimoto's thyroiditis.  She has been managing the diabetes with  insulin and states for the hypothyroidism as a result of Hashimoto's thyroiditis she was only started on levothyroxine 25 mcg once daily approximately 1 month ago.  There has been a conflict between the endocrinologists she has seen in regards to starting the levothyroxine, but due to progressive complaints which the patient and her primary care physician felt was related to hypothyroidism she was finally started on thyroid supplementation 1 month ago.  She has not noticed a change in her symptoms so far and has not had a TSH rechecked yet.  The TSH was slightly elevated at 5.24 on 5/1 prior to starting the levothyroxine.  She is also scheduled to see a private endocrinologist for another opinion.     She reports over the past 1 and half years she has also started to experience joint and muscle pain which has been gradually progressive.  She experiences a degree of pain on a daily basis but states that her symptoms can spontaneously flare-up as well as with changes in the weather, especially when it is cold/damp/humid.  She does feel better with the warmer weather and has also started utilizing a therapy pool which does help with her symptoms.  She has noticed joint pains to affect her hands occasionally but prominently in her wrists.  She will notice pain in her shoulders and hips mostly with manipulation and range of motion.  She describes chronic pain that will also affect her knees as well as in her ankles and feet.  At times she will notice a muscle pain throughout her upper and lower extremities as well.  She has noticed muscle cramps to affect her hands and feet.  No significant joint pains in her elbows or low back.  She has not noticed any obvious swelling of her wrists but feels like they may be swollen as she can gauge this by her bracelet.  She has also noticed swelling to affect her ankles.  She does experience morning stiffness which affects her diffusely and takes about 30-40 minutes to improve.  She  tries to avoid Tylenol as this affects her blood glucose monitoring.  She has tried ibuprofen for her symptoms which has not helped significantly.     Other than the body pain she also describes chronic fatigue, unintentional weight gain and hair thinning.  She denies fevers, unintentional weight loss, focal alopecia, dry eyes, dry mouth, inflammatory eye disease, skin rash, psoriasis, photosensitivity, mouth/nose ulcers, swollen glands, pleuritic chest pain, shortness of breath, inflammatory bowel disease, blood clots (reports a history of 1 very early miscarriage), Raynaud's or a family history of autoimmune disease.     Testing done for her symptoms showed the positive SOHAM and borderline positive rheumatoid factor.  A rheumatoid factor was negative in 2019.  An ESR, CRP, anti CCP antibody, CK, Lyme antibody profile, anti neutrophilic cytoplasmic antibodies, Sjogren's antibodies and anti double-stranded DNA antibody were normal.  An MRI of her right knee and ultrasound of her right Achilles tendon in 2018 were normal.     In view of her complaints she was also evaluated by Neurology to rule out multiple sclerosis and currently there is no specific diagnosis from their perspective.  She did have an MRI of her brain done last year which showed a single focus of white matter change which has been stable since 2014.        7/13/2021:  Patient presents for a follow-up of a positive SOHAM.  I reviewed her workup done following the last office visit which showed an unremarkable SOHAM specificity, C3, C4, antiphospholipid antibody testing, urinalysis, urine protein creatinine ratio, vitamin-D, CK, anti CCP antibody and HLA B27 antigen.  X-rays of her hands and feet were unremarkable.       She reports there has been no change in her symptoms since the last office visit and she continues to describe the chronic fatigue, muscle aches/spasms as well as ongoing joint pains.  She is scheduled for another endocrinology opinion  tomorrow to see if any of her symptoms may be related to the underlying hypothyroidism, but this is not felt to be the case so far.      2/16/2022:  Patient presents for follow-up of an undifferentiated connective tissue disease.  She is currently on hydroxychloroquine 200 mg twice daily that was started in July 2021.      She reports in about September she started to notice a significant improvement in her well-being with being on the hydroxychloroquine.  There was an improvement in her fatigue and joint pains.  She had overall been doing well up until January but after she received the COVID-19 booster vaccination noticed a flare-up in her symptoms with more fatigue as well as significant joint pains which mostly affected her hands, wrists, knees and ankles.  She has been noticing intermittent swelling of her fingers as well as ankles/feet.  She has been taking Tylenol alternating with ibuprofen but is unsure if the medications are specifically helping her or if the side effects as a result of the vaccination are gradually improving on their own.  She was also evaluated by Gastroenterology and had an upper endoscopy done which showed chronic gastritis so she was advised to minimize NSAID use.  No recent steroids.    Except for the pain she has also had a few occurrences of redness on her face in a butterfly pattern as well as affecting her forehead.  She has noticed photosensitivity and states while she was at the beach in August she developed a skin rash in sun-exposed areas.  She has been more careful with applying sunscreen as well as covering up in the sun.  She denies fevers, weight loss, alopecia, mouth/nose ulcers, swollen glands or pleuritic chest pain.      6/22/2022:  Patient presents for follow-up of an undifferentiated connective tissue disease.  She is currently on hydroxychloroquine 200 mg twice daily that was started in July 2021.  I reviewed her blood work from February which showed a normal CBC,  CMP, ESR, CRP, C3, C4, double-stranded DNA antibody, urinalysis and urine protein creatinine ratio.    She reports overall since the last office visit she has been fairly stable except for an episode of pericarditis following the COVID-19 booster vaccination in January.  She was seen by Cardiology and prescribed colchicine to take for 3 months.  She reports the symptoms have mostly resolved and only on occasion will she notice some chest pain which is not long-lasting.  She reports with sun exposure she will start to notice more joint pains and fatigue.  For the most part she deals with some degree of joint pain on a daily basis and still notices swelling affecting her hands and ankles.  She will be starting occupational therapy as due to the joint pain in her hands she has started to feel weakness in her hand strength.  No fevers, unintentional weight loss, skin rashes or mouth/nose ulcers.    Although she still endorses symptoms she reports overall she has been feeling much better since starting the hydroxychloroquine.      10/27/2022:  Patient presents for a follow-up of an undifferentiated connective disease.  She is currently on hydroxychloroquine 200 mg twice daily that was started in July 2021 and oral methotrexate 15 mg once weekly that was started in June 2022.  I reviewed her recent labs which showed an unremarkable CBC, CMP, ESR, CRP, C3, C4, double-stranded DNA antibody and urine protein creatinine ratio.      She reports she has been doing well without any joint pains and the methotrexate really seems to have helped her.  With initially starting it she did notice numbness in her hand and legs for which she was seen in the emergency department but as it was unclear if this was related to the methotrexate I requested she resume it and the symptoms have not been consistent.  No skin rashes or mouth/nose ulcers.    She does report chronic symptoms of abdominal pain, nausea, vomiting and greasy stools for  which she has been following with Gastroenterology.  A CT abdomen was unremarkable.  She is scheduled for a HIDA scan.      3/2/2023:  Patient presents for a follow-up of an undifferentiated connective disease.  She is currently on hydroxychloroquine 200 mg twice daily that was started in July 2021 and oral methotrexate 20 mg once weekly that was started in June 2022.  I reviewed her recent labs which showed an unremarkable CBC, CMP, ESR, CRP, C3, C4, double-stranded DNA antibody and urine protein creatinine ratio.      She has been doing well since the last office visit and denies any complaints today.  No symptoms such as true fevers, unintentional weight loss, alopecia, skin rashes, mouth/nose ulcers, swollen glands, pleuritic chest pain or joint pain/swelling/stiffness.    She has been tolerating her medications well and is up-to-date with her annual eye exams but reports since December she has been dealing with recurrent infections.  She took a while to recover from COVID and recently was diagnosed with back to back sinus infections requiring antibiotic use.      9/11/2023:  Patient presents for a follow-up of an undifferentiated connective disease.  She is currently on hydroxychloroquine 200 mg twice daily on the weekdays and once daily on the weekend that was started in July 2021 and oral methotrexate 20 mg once weekly that was started in June 2022.  I reviewed her recent labs which showed an unremarkable CBC, CMP, ESR, CRP, C3, double-stranded DNA antibody and urine protein creatinine ratio.  A C4 complement was slightly decreased at 16.    Unfortunately since the last visit she has been feeling unwell with increased fatigue and widespread body pain that has been flaring up.  This is leading to a significant impact on her mental health.  She reports especially over the past month she has been in a constant flare.  Prior to this she did have an infection for which she was on antibiotics and held the  injectable methotrexate for 1 week.  Initially she was trying to manage through the symptoms but did contact me on August 31 at which time I prescribed her prednisone 20 mg once daily for 7 days which did help.  The steroids do raise her blood sugars up to the 400s but she has an insulin pump which will manage the elevated blood sugars.  After completing the steroids she is still not feeling well and is very upset and tearful at today's visit.  Apart from the joint pain she has also been noticing swelling that can primarily affect her hands, wrists, knees and ankles.  The steroids did help with the joint swelling.  She has noticed occurrence of a skin rash on her face as well.    She has been tolerating the hydroxychloroquine and methotrexate well overall but has been dealing with recurrent infections which was present even prior to the initiation of methotrexate.  She is following with immunology to determine if she may have an underlying immunodeficiency.  She is up-to-date with her annual eye exams.      12/18/2023:  Patient presents for a follow-up of an undifferentiated connective disease.  She is currently on hydroxychloroquine 200 mg twice daily on the weekdays and once daily on the weekend that was started in July 2021, injectable methotrexate 20 mg once weekly and Benlysta infusions 10 mg/kg every 4 weeks that was added after the last visit.  I reviewed her recent labs which showed a slightly decreased C4 complement of 13.  A CBC, CMP, ESR, CRP, C3, double-stranded DNA antibody, urine analysis and urine protein creatinine ratio were unremarkable.    She has completed the Benlysta infusions and received her first maintenance infusion recently.  She notes that it has helped with the facial rash but so far no significant impact on the fatigue and joint pains which she is still symptomatic with.  She avoids Tylenol and NSAIDs as this causes stomach upset.  She is currently on gabapentin 300 mg nightly per  neurology for lower extremity neuropathy but states the medication does not help with the neuropathic complaints or with joint pains.    She has been tolerating the hydroxychloroquine, methotrexate and Benlysta well without any concerning side effects.  She is up-to-date with her annual eye exams.      6/12/2024:  Patient presents for a follow-up of an undifferentiated connective disease.  She is currently on hydroxychloroquine 200 mg twice daily on the weekdays and once daily on the weekend that was started in July 2021, injectable methotrexate 20 mg once weekly and Benlysta infusions 10 mg/kg every 4 weeks.  She is due to update her full panel of lupus related labs.    She reports with consistent use of the Benlysta she has noticed a significant improvement in her pain levels and she is currently denying any concerning joint pains, swelling or stiffness.  No unexplained fevers, unintentional weight loss, focal alopecia, mouth/nose ulcers, swollen glands or pleuritic chest pain that she describes.  Recently she has noticed an itchy, raised skin rash to occur on her bilateral arms and abdomen which has been occurring on a mostly nightly basis.  She is wondering if this could be related to an allergic exposure.  She has been using Benadryl as needed which helps.  She has previously seen an allergist and had testing done which was unremarkable.    She mentions since her childhood she has had issues with recurrent upper respiratory tract infections.  This pattern seems to be continuing as she had a prolonged sinus infection from February till the end of March and was diagnosed with recurrent upper respiratory tract infections twice in May.  She has been able to manage this with outpatient antibiotics but states anytime she has an infection her blood sugars significantly elevate and she has been hospitalized for diabetic ketoacidosis.    She has been tolerating the hydroxychloroquine, methotrexate and Benlysta well  without any concerning side effects, other than possible infection risk.  She is up-to-date with her annual eye exams.    The following portions of the patient's history were reviewed and updated as appropriate: allergies, current medications, past family history, past medical history, past social history, past surgical history and problem list.      Review of Systems  Constitutional: Negative for weight change, fevers, chills, night sweats.    ENT/Mouth: Negative for hearing changes, ear pain, nasal congestion, sinus pain, hoarseness, sore throat, rhinorrhea, swallowing difficulty.   Eyes: Negative for pain, redness, discharge, vision changes.   Cardiovascular: Negative for chest pain, SOB, palpitations.   Respiratory: Negative for cough, sputum, wheezing, dyspnea.   Gastrointestinal: Negative for nausea, vomiting, diarrhea, pain.  Genitourinary: Negative for dysuria, urinary frequency, hematuria.   Musculoskeletal: As per HPI.  Skin: Negative for skin rash currently, color changes.   Neuro: Negative for weakness, numbness, tingling, loss of consciousness.   Psych: Negative for depression.    Heme/Lymph: Negative for easy bruising, bleeding, lymphadenopathy.        Past Medical History:   Diagnosis Date    Abdominal pain     Anaphylaxis     Anxiety     Anxiety and depression     COVID-19 12/2020    Delayed emergence from anesthesia 03/23/2023    Severe episode of emergence delirium (confused, combative) after MAC anesthetic on 03/2023 for EGD. Received versed 2mg, >50mcg precedex, 5mg IV haldol, and 50mcg fentanyl. Waxing and waning episodes of agitation. Any future anesthetics should NOT be done at [facility].    Delirium, induced by drug     anesthesia    Depression     Diabetes mellitus (HCC)     Disease of thyroid gland     Hashimoto    DKA, type 1 (HCC) 05/11/2021    Eating disorder     history of anorexia/bulemia 5746-2981    Food intolerance     Fracture of fibula     R Salter I    Gilbert disease     Hashimoto's  disease 02/21/2020    Head injury     Headache(784.0)     Nasal congestion     PTSD (post-traumatic stress disorder)     Rectal bleed     Seizures (HCC)     Type 1 diabetes (HCC)        Past Surgical History:   Procedure Laterality Date    COLONOSCOPY      EGD  03/23/2023    KNEE SURGERY Right 07/06/2020    NASAL SEPTOPLASTY W/ TURBINOPLASTY      SINUS SURGERY      SKIN BIOPSY      TURBINOPLASTY N/A 03/22/2021    Procedure: TURBINOPLASTY;  Surgeon: Jn Hidalgo MD;  Location: [facility];  Service: ENT    UPPER GASTROINTESTINAL ENDOSCOPY      WISDOM TOOTH EXTRACTION      WRIST SURGERY      left; Excision of ganglion       Social History     Socioeconomic History    Marital status: /Civil Union     Spouse name: Not on file    Number of children: 0    Years of education: Not on file    Highest education level: Associate degree: academic program   Occupational History    Occupation: unemployed   Tobacco Use    Smoking status: Never     Passive exposure: Never    Smokeless tobacco: Never    Tobacco comments:     Tobacco smoke exposure (Father smokes cigars)   Vaping Use    Vaping status: Never Used   Substance and Sexual Activity    Alcohol use: Yes     Comment: rarely    Drug use: Never    Sexual activity: Yes     Partners: Male     Birth control/protection: Condom Male, OCP   Other Topics Concern    Not on file   Social History Narrative    Student at [employer]    Reports a poor diet; low in vegetables, high in sweets    Dental care, regularly    Lives with parents    Sleeps 8-10 hours a day        Do you have pets? Dog,cat Is pet allowed in bedroom?Yes    Are you a smoker? Never    Does anyone smoke in your home? No       Do you live with smokers? Yes    [place] frequently? No   How many times a year? N/A      Social Determinants of Health     Financial Resource Strain: Low Risk  (7/8/2023)    [facility]    Overall Financial Resource Strain  (CARDIA)     Difficulty of Paying Living Expenses: Not hard at all   Food Insecurity: No Food Insecurity (1/16/2024)    Hunger Vital Sign     Worried About Running Out of Food in the Last Year: Never true     Ran Out of Food in the Last Year: Never true   Transportation Needs: No Transportation Needs (1/16/2024)    PRAPARE - Transportation     Lack of Transportation (Medical): No     Lack of Transportation (Non-Medical): No   Physical Activity: Insufficiently Active (8/7/2020)    Exercise Vital Sign     Days of Exercise per Week: 7 days     Minutes of Exercise per Session: 10 min   Stress: Stress Concern Present (8/7/2020)    [place] [employer] - Occupational Stress Questionnaire     Feeling of Stress : Rather much   Social Connections: Unknown (8/7/2020)    Social Connection and Isolation Panel [NHANES]     Frequency of Communication with Friends and Family: More than three times a week     Frequency of Social Gatherings with Friends and Family: Not on file     Attends Restoration Services: Not on file     Active Member of Clubs or Organizations: No     Attends Club or Organization Meetings: Never     Marital Status: Never    Intimate Partner Violence: Not At Risk (7/8/2023)    [facility], [facility]    Humiliation, Afraid, Rape, and Kick questionnaire     Fear of Current or Ex-Partner: No     Emotionally Abused: No     Physically Abused: No     Sexually Abused: No   Housing Stability: Unknown (1/16/2024)    Housing Stability Vital Sign     Unable to Pay for Housing in the Last Year: No     Number of Times Moved in the Last Year: Not on file     Homeless in the Last Year: No       Family History   Problem Relation Age of Onset    Hypertension Mother     Migraines Mother         Headache    Diabetes type II Mother     Varicose Veins Mother     Hyperlipidemia Mother     Diabetes Mother     Arthritis Mother     Depression Mother     Hearing  "loss Mother     Anxiety disorder Mother     Eczema Father     Cholelithiasis Father     Hypertension Father     Sarcoidosis Father         Liver    Hyperlipidemia Father     Diabetes Father     Coronary artery disease Father     Nephrolithiasis Father     Cirrhosis Father     Alcohol abuse Father     Thyroid disease Sister     Hashimoto's thyroiditis Sister     Alcohol abuse Brother     Cancer Family     Diabetes Family     Hypertension Family        Allergies   Allergen Reactions    Insulin Glargine Other (See Comments)     LANTUS BRAND -Burning and redness under skin        Current Outpatient Medications:     acetaminophen (TYLENOL) 325 mg tablet, Take 3 tablets (975 mg total) by mouth every 8 (eight) hours as needed for mild pain or headaches, Disp: , Rfl:     BD Insulin Syringe U/F 31G X 5/16\" 0.5 ML MISC, Use as directly with weekly methotrexate injection., Disp: 100 each, Rfl: 0    Belimumab (BENLYSTA IV), Inject into a catheter in a vein every month to absorb continually Switching to monthly on 12/5, Disp: , Rfl:     clindamycin (CLEOCIN T) 1 % lotion, Apply 1 application. topically 2 (two) times a day, Disp: , Rfl:     Cyanocobalamin 1000 MCG SUBL, Place 1 tablet (1,000 mcg total) under the tongue daily, Disp: 90 tablet, Rfl: 0    famotidine (PEPCID) 40 MG tablet, Take 1 tablet (40 mg total) by mouth daily at bedtime, Disp: 90 tablet, Rfl: 3    Fluticasone-Salmeterol (Advair) 250-50 mcg/dose inhaler, Inhale 1 puff 2 (two) times a day Rinse mouth after use., Disp: 60 blister, Rfl: 0    folic acid (FOLVITE) 1 mg tablet, TAKE 1 TABLET BY MOUTH EVERY DAY, Disp: 90 tablet, Rfl: 3    gabapentin (Neurontin) 600 MG tablet, Take 1 tablet (600 mg total) by mouth daily, Disp: 90 tablet, Rfl: 1    Glucagon (Gvoke HypoPen 2-Pack) 1 MG/0.2ML SOAJ, 1 mg PRN for severe hypoglycemia, Disp: 0.4 mL, Rfl: 0    glucagon 1 MG injection, 1 mg, Disp: , Rfl:     Glucagon HCl (Glucagon Emergency) 1 MG/ML SOLR, , Disp: , Rfl:     " "hydroxychloroquine (PLAQUENIL) 200 mg tablet, Take 1 tablet (200 mg total) by mouth 2 (two) times a day with meals, Disp: 180 tablet, Rfl: 3    Insulin Disposable Pump (Omnipod 5 G6 Pod, Gen 5,) MISC, , Disp: , Rfl:     Insulin Pen Needle (BD PEN NEEDLE NASIM U/F) 32G X 4 MM MISC, by Does not apply route 4 (four) times a day for 180 days, Disp: 400 each, Rfl: 3    levonorgestrel-ethinyl estradiol (NORDETTE) 0.15-30 MG-MCG per tablet, Take 1 tablet by mouth daily, Disp: 84 tablet, Rfl: 1    levothyroxine 25 mcg tablet, Take 1 tablet (25 mcg total) by mouth daily, Disp: 60 tablet, Rfl: 0    methotrexate 50 MG/2ML injection, Inject 0.8 mL (20 mg total) under the skin once a week, Disp: 8 mL, Rfl: 4    NovoLOG 100 UNIT/ML injection, Up to 100 units per day with insulin pump, Disp: 90 mL, Rfl: 3    Tresiba FlexTouch 100 units/mL injection pen, Inject 32 Units under the skin daily, Disp: 15 mL, Rfl: 2      Objective:    Vitals:    06/12/24 0832   BP: 110/78   Weight: 57.6 kg (127 lb)   Height: 5' 1\" (1.549 m)       Physical Exam  General: Well appearing, well nourished, in no distress. Oriented x 3, normal mood and affect.  Ambulating without difficulty.  Skin: Good turgor, no rash today, unusual bruising or prominent lesions.    Hair: Normal texture and distribution.  HEENT:  Head: Normocephalic, atraumatic.  Eyes: Conjunctiva clear, sclera non-icteric, EOM intact.  Extremities: No amputations or deformities, cyanosis, edema.  Neurologic: Alert and oriented. No focal neurological deficits appreciated.   Psychiatric: Normal mood and affect.       Chelle Tanner M.D.  Rheumatology  "

## 2024-06-11 ENCOUNTER — TELEPHONE (OUTPATIENT)
Dept: PSYCHIATRY | Facility: CLINIC | Age: 29
End: 2024-06-11

## 2024-06-11 NOTE — TELEPHONE ENCOUNTER
Called and spoke with patient to inform talk therapy appointments have been cancelled due to provider no longer being with the clinic. Patient will be added to list for ANTWAN. Please transfer call to providers MR if patient has questions.

## 2024-06-12 ENCOUNTER — OFFICE VISIT (OUTPATIENT)
Dept: RHEUMATOLOGY | Facility: CLINIC | Age: 29
End: 2024-06-12

## 2024-06-12 ENCOUNTER — HOSPITAL ENCOUNTER (OUTPATIENT)
Dept: INFUSION CENTER | Facility: CLINIC | Age: 29
Discharge: HOME | End: 2024-06-12
Payer: COMMERCIAL

## 2024-06-12 VITALS
SYSTOLIC BLOOD PRESSURE: 106 MMHG | OXYGEN SATURATION: 99 % | HEART RATE: 99 BPM | DIASTOLIC BLOOD PRESSURE: 74 MMHG | TEMPERATURE: 97.6 F | BODY MASS INDEX: 24.66 KG/M2 | WEIGHT: 130.5 LBS | RESPIRATION RATE: 17 BRPM

## 2024-06-12 VITALS
DIASTOLIC BLOOD PRESSURE: 78 MMHG | HEIGHT: 61 IN | SYSTOLIC BLOOD PRESSURE: 110 MMHG | BODY MASS INDEX: 23.98 KG/M2 | WEIGHT: 127 LBS

## 2024-06-12 DIAGNOSIS — Z79.899 LONG-TERM USE OF PLAQUENIL: ICD-10-CM

## 2024-06-12 DIAGNOSIS — M35.9 UNDIFFERENTIATED CONNECTIVE TISSUE DISEASE (HCC): Primary | ICD-10-CM

## 2024-06-12 DIAGNOSIS — Z79.60 LONG-TERM USE OF IMMUNOSUPPRESSANT MEDICATION: ICD-10-CM

## 2024-06-12 DIAGNOSIS — M79.7 FIBROMYALGIA: ICD-10-CM

## 2024-06-12 DIAGNOSIS — E10.9 TYPE 1 DIABETES MELLITUS WITHOUT COMPLICATION (HCC): ICD-10-CM

## 2024-06-12 DIAGNOSIS — E06.3 HASHIMOTO'S DISEASE: ICD-10-CM

## 2024-06-12 DIAGNOSIS — Z79.631 METHOTREXATE, LONG TERM, CURRENT USE: ICD-10-CM

## 2024-06-12 DIAGNOSIS — M32.8 OTHER FORMS OF SYSTEMIC LUPUS ERYTHEMATOSUS, UNSPECIFIED ORGAN INVOLVEMENT STATUS (HCC): Primary | ICD-10-CM

## 2024-06-12 PROCEDURE — 96413 CHEMO IV INFUSION 1 HR: CPT

## 2024-06-12 RX ORDER — ACETAMINOPHEN 325 MG/1
650 TABLET ORAL ONCE
OUTPATIENT
Start: 2024-07-09

## 2024-06-12 RX ORDER — DIPHENHYDRAMINE HCL 25 MG
25 TABLET ORAL ONCE
Status: COMPLETED | OUTPATIENT
Start: 2024-06-12 | End: 2024-06-12

## 2024-06-12 RX ORDER — SODIUM CHLORIDE 9 MG/ML
20 INJECTION, SOLUTION INTRAVENOUS ONCE
Status: COMPLETED | OUTPATIENT
Start: 2024-06-12 | End: 2024-06-12

## 2024-06-12 RX ORDER — SODIUM CHLORIDE 9 MG/ML
20 INJECTION, SOLUTION INTRAVENOUS ONCE
OUTPATIENT
Start: 2024-07-09

## 2024-06-12 RX ORDER — DIPHENHYDRAMINE HCL 25 MG
25 TABLET ORAL ONCE
OUTPATIENT
Start: 2024-07-09

## 2024-06-12 RX ORDER — ACETAMINOPHEN 325 MG/1
650 TABLET ORAL ONCE
Status: COMPLETED | OUTPATIENT
Start: 2024-06-12 | End: 2024-06-12

## 2024-06-12 RX ADMIN — SODIUM CHLORIDE 20 ML/HR: 0.9 INJECTION, SOLUTION INTRAVENOUS at 15:03

## 2024-06-12 RX ADMIN — BELIMUMAB 600 MG: 120 INJECTION, POWDER, LYOPHILIZED, FOR SOLUTION INTRAVENOUS at 15:51

## 2024-06-12 RX ADMIN — DIPHENHYDRAMINE HYDROCHLORIDE 25 MG: 25 TABLET ORAL at 15:04

## 2024-06-12 RX ADMIN — ACETAMINOPHEN 650 MG: 325 TABLET ORAL at 15:04

## 2024-06-12 NOTE — PROGRESS NOTES
Pt tolerated infusion without complaint or issue, IV removed, next appointment scheduled for 7/12 at 3pm, declined AVS

## 2024-06-12 NOTE — PROGRESS NOTES
Patient is here for benlysta. She offers no complaints at this time. She denies any s/s of infection or being on antibiotics at this time.

## 2024-06-13 ENCOUNTER — TELEPHONE (OUTPATIENT)
Dept: BEHAVIORAL/MENTAL HEALTH CLINIC | Facility: CLINIC | Age: 29
End: 2024-06-13

## 2024-06-13 NOTE — TELEPHONE ENCOUNTER
Called and left message for patient to return a call to [phone] and schedule ANTWAN to Camryn Langford. Please schedule upon return call, and route back to provider support team to notify.

## 2024-06-13 NOTE — TELEPHONE ENCOUNTER
Patient contacted the office, per prior note, to schedule a ANTWAN with provider. Patient is now scheduled for 8/13/24  at 11am in office.

## 2024-06-13 NOTE — TELEPHONE ENCOUNTER
----- Message from Camryn JUAN sent at 6/12/2024  5:55 PM EDT -----  Regarding: RE: Lupe Nye!  I can see her.  I just don't know how often at this point in time.  I'll have Suma call to see when she can make an appointment.  ----- Message -----  From: Orly Ford  Sent: 6/12/2024   5:01 PM EDT  To: YASMIN Booth  Subject: RE: ANTWAN Mcmahan  ----- Message -----  From: YASMIN Booth  Sent: 6/12/2024   4:00 PM EDT  To: Orly Ford  Subject: RE: ANTWAN                                          I don't see the chart attached...  ----- Message -----  From: Orly Ford  Sent: 6/12/2024   3:44 PM EDT  To: YASMIN Booth  Subject: ANTWAN                                              This is one of [place]'s patients who is more acute. After reading the chart I felt that you would do well with her due to your high level of empathy and ability to manage medical complications. Will you please review and let me know if you are willing to see this patient? Thank you!

## 2024-06-14 ENCOUNTER — DOCUMENTATION (OUTPATIENT)
Dept: BEHAVIORAL/MENTAL HEALTH CLINIC | Facility: CLINIC | Age: 29
End: 2024-06-14

## 2024-06-14 ENCOUNTER — OFFICE VISIT (OUTPATIENT)
Dept: PODIATRY | Facility: CLINIC | Age: 29
End: 2024-06-14
Payer: COMMERCIAL

## 2024-06-14 VITALS — WEIGHT: 130 LBS | HEIGHT: 61 IN | BODY MASS INDEX: 24.55 KG/M2

## 2024-06-14 DIAGNOSIS — B07.0 PLANTAR WART: Primary | ICD-10-CM

## 2024-06-14 PROCEDURE — 99212 OFFICE O/P EST SF 10 MIN: CPT | Performed by: PODIATRIST

## 2024-06-14 NOTE — PROGRESS NOTES
The patient presents for wart treatment.  Pain resolved.  No new complaint.      From the exam, verrucous lesions resolved left foot.  No pain on palpation.  No wound.      Instructed her to monitor for any recurring problem.  Instructed skin care and protection.  She will return in 1 year for diabetic foot exam.

## 2024-06-14 NOTE — PROGRESS NOTES
TRANSFER SUMMARY    [facility]    Patient Name Jenny Rodrigues     Date of Birth: 29 y.o. 1995      MRN: [id number]    /Admission Date:  8/12/19    Date of Transfer: June 14, 2024    Admission Diagnosis:     Bipolar affective disorder  Generalized Anxiety Disorder  PTSD  OCD    Current Diagnosis:     /No diagnosis found.  /  Reason for Admission: Jenny presented for treatment due to depression, anxiety, and PTSD symptoms. Primary complaints included DEPRESSIVE SYMPTOMS: depressed mood, sadness, decreased interest and ANXIETY SYMPTOMS: daily anxiety symptoms, feeling nervous.     Progress in Treatment: Jenny was seen for Individual Couseling. During the course of treatment she Continues to progress.    Episodes of Higher Level of Care: No    Transfer request Initiated by: Psychiatrist: Dr. Marline Randhawa Therapist: Yane Martinez    Reason for Transfer Request: clinician leaving practice    Does this individual need a clinician with specialized training/expertise?: No    [facility] this [facility]? Psychiatrist: None Therapist: None    Other pertinent issues: None    Are there any specific individuals who would be a “best fit” or who have already agreed to accept this transfer request?      Psychiatrist: None   Therapist: Camryn Langford  Rationale:  clinician left practice    Attempts to maintain the current therapeutic relationship: No    Transfer request routed to Clinical Coordinator for input and/or approval.     Comments from other involved providers and/or clinical coordinator : None    Orly Ford06/14/24

## 2024-06-15 DIAGNOSIS — Z00.6 ENCOUNTER FOR EXAMINATION FOR NORMAL COMPARISON OR CONTROL IN CLINICAL RESEARCH PROGRAM: ICD-10-CM

## 2024-06-21 ENCOUNTER — OFFICE VISIT (OUTPATIENT)
Dept: FAMILY MEDICINE CLINIC | Facility: CLINIC | Age: 29
End: 2024-06-21
Payer: COMMERCIAL

## 2024-06-21 VITALS
RESPIRATION RATE: 16 BRPM | BODY MASS INDEX: 24.66 KG/M2 | DIASTOLIC BLOOD PRESSURE: 70 MMHG | HEIGHT: 61 IN | WEIGHT: 130.6 LBS | HEART RATE: 104 BPM | SYSTOLIC BLOOD PRESSURE: 118 MMHG | TEMPERATURE: 97.8 F | OXYGEN SATURATION: 98 %

## 2024-06-21 DIAGNOSIS — Z91.018 FOOD ALLERGY: ICD-10-CM

## 2024-06-21 DIAGNOSIS — Z11.4 SCREENING FOR HIV (HUMAN IMMUNODEFICIENCY VIRUS): ICD-10-CM

## 2024-06-21 DIAGNOSIS — F31.62 BIPOLAR DISORDER, CURRENT EPISODE MIXED, MODERATE (HCC): ICD-10-CM

## 2024-06-21 DIAGNOSIS — Z00.00 ANNUAL PHYSICAL EXAM: Primary | ICD-10-CM

## 2024-06-21 PROCEDURE — 99395 PREV VISIT EST AGE 18-39: CPT | Performed by: NURSE PRACTITIONER

## 2024-06-21 RX ORDER — EPINEPHRINE 0.3 MG/.3ML
0.3 INJECTION SUBCUTANEOUS ONCE
Qty: 0.6 ML | Refills: 0 | Status: SHIPPED | OUTPATIENT
Start: 2024-06-21 | End: 2024-06-21

## 2024-06-21 NOTE — ASSESSMENT & PLAN NOTE
Denies SI.  Has been managing with therapy due to multiple medication intolerance in the past.  Her therapist left the practice, now can't be seen until August.  Reports a failed suicide attempt in April, no longer suicidal.  Continue to monitor, call with any concerns.

## 2024-06-21 NOTE — ASSESSMENT & PLAN NOTE
Gyn exams up to date, continue routine dental and eye exams.  Screening for hiv ordered, never tested.  Encourage healthy diet, exercise as tolerated.  Vaccines up to date.
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
## 2024-07-02 ENCOUNTER — TELEPHONE (OUTPATIENT)
Age: 29
End: 2024-07-02

## 2024-07-02 ENCOUNTER — TELEPHONE (OUTPATIENT)
Dept: NEUROLOGY | Facility: CLINIC | Age: 29
End: 2024-07-02

## 2024-07-02 NOTE — TELEPHONE ENCOUNTER
Called and notified pt of Dr. Tanner recommendations. Pt understands and will try to rest as much and avoid sun. Pt reports if she doesn't feel better will notify office and possibly seek work note.

## 2024-07-02 NOTE — TELEPHONE ENCOUNTER
Pt calls in an dstates she is having a flare up of her lupus. Pt was warm transferred to nurse at this time

## 2024-07-02 NOTE — TELEPHONE ENCOUNTER
Patient is scheduled with Dr VO on 7/31, but this appt needs to be rescheduled d/t Dr VO being unavailable at scheduled time.     Called and spoke to patient. Rescheduled appt to 8/13. Patient ok with [place] office.

## 2024-07-02 NOTE — TELEPHONE ENCOUNTER
Would recommend supportive care with rest and Tylenol, please advise to strictly stay out of sun. Will avoid steroids due to hyperglycemia.

## 2024-07-10 ENCOUNTER — TELEPHONE (OUTPATIENT)
Age: 29
End: 2024-07-10

## 2024-07-10 NOTE — TELEPHONE ENCOUNTER
Pt called in stating that she needs to have documentation fro Dr. Hilton and a prescription sent in for her DME. Pt states that it had been requested a while ago and wanted to know if it had been submitted. Please give pt a call back and advise.

## 2024-07-11 LAB
DME PARACHUTE DELIVERY DATE REQUESTED: NORMAL
DME PARACHUTE ITEM DESCRIPTION: NORMAL
DME PARACHUTE ITEM DESCRIPTION: NORMAL
DME PARACHUTE ORDER STATUS: NORMAL
DME PARACHUTE SUPPLIER NAME: NORMAL
DME PARACHUTE SUPPLIER PHONE: NORMAL

## 2024-07-11 NOTE — TELEPHONE ENCOUNTER
Spoke to patient. She needs Dexcom G6 supplies sent to [facility]. I have completed the order on [place].

## 2024-07-12 ENCOUNTER — HOSPITAL ENCOUNTER (OUTPATIENT)
Dept: INFUSION CENTER | Facility: CLINIC | Age: 29
End: 2024-07-12
Payer: COMMERCIAL

## 2024-07-12 VITALS
HEART RATE: 93 BPM | DIASTOLIC BLOOD PRESSURE: 73 MMHG | TEMPERATURE: 97.7 F | WEIGHT: 131 LBS | RESPIRATION RATE: 17 BRPM | BODY MASS INDEX: 24.75 KG/M2 | SYSTOLIC BLOOD PRESSURE: 106 MMHG | OXYGEN SATURATION: 98 %

## 2024-07-12 DIAGNOSIS — M32.8 OTHER FORMS OF SYSTEMIC LUPUS ERYTHEMATOSUS, UNSPECIFIED ORGAN INVOLVEMENT STATUS (HCC): Primary | ICD-10-CM

## 2024-07-12 PROCEDURE — 96413 CHEMO IV INFUSION 1 HR: CPT

## 2024-07-12 RX ORDER — DIPHENHYDRAMINE HCL 25 MG
25 TABLET ORAL ONCE
Status: COMPLETED | OUTPATIENT
Start: 2024-07-12 | End: 2024-07-12

## 2024-07-12 RX ORDER — SODIUM CHLORIDE 9 MG/ML
20 INJECTION, SOLUTION INTRAVENOUS ONCE
Status: COMPLETED | OUTPATIENT
Start: 2024-07-12 | End: 2024-07-12

## 2024-07-12 RX ORDER — ACETAMINOPHEN 325 MG/1
650 TABLET ORAL ONCE
OUTPATIENT
Start: 2024-08-07

## 2024-07-12 RX ORDER — DIPHENHYDRAMINE HCL 25 MG
25 TABLET ORAL ONCE
OUTPATIENT
Start: 2024-08-07

## 2024-07-12 RX ORDER — SODIUM CHLORIDE 9 MG/ML
20 INJECTION, SOLUTION INTRAVENOUS ONCE
OUTPATIENT
Start: 2024-08-07

## 2024-07-12 RX ORDER — ACETAMINOPHEN 325 MG/1
650 TABLET ORAL ONCE
Status: COMPLETED | OUTPATIENT
Start: 2024-07-12 | End: 2024-07-12

## 2024-07-12 RX ADMIN — DIPHENHYDRAMINE HYDROCHLORIDE 25 MG: 25 TABLET ORAL at 15:07

## 2024-07-12 RX ADMIN — ACETAMINOPHEN 650 MG: 325 TABLET ORAL at 15:07

## 2024-07-12 RX ADMIN — SODIUM CHLORIDE 20 ML/HR: 0.9 INJECTION, SOLUTION INTRAVENOUS at 15:08

## 2024-07-12 RX ADMIN — BELIMUMAB 600 MG: 120 INJECTION, POWDER, LYOPHILIZED, FOR SOLUTION INTRAVENOUS at 15:45

## 2024-07-12 NOTE — PROGRESS NOTES
Pt tolerated treatment, offers no complaints, next treatment scheduled for 8/8 at 3pm, declined AVS

## 2024-07-12 NOTE — LETTER
[facility]  [address]  [address]  [place] [address]  Dept: [phone]    July 12, 2024     Patient: Jenny Rodrigues   YOB: 1995   Date of Visit: 7/12/2024       To Whom it May Concern:    Jenny Rodrigues is under my professional care. She was seen in the hospital from 7/12/2024 to 07/12/24. She may return to work on 07/15/2024 without limitations.    If you have any questions or concerns, please don't hesitate to call.         Sincerely,          Ambika Parsons RN

## 2024-07-12 NOTE — PROGRESS NOTES
Pt resting with no complaints, no recent antibiotic use/illness noted, call bell within reach. All questions answered and emotional support given. All needs met at this time.

## 2024-08-08 ENCOUNTER — HOSPITAL ENCOUNTER (OUTPATIENT)
Dept: INFUSION CENTER | Facility: CLINIC | Age: 29
Discharge: HOME | End: 2024-08-08
Payer: COMMERCIAL

## 2024-08-08 VITALS
SYSTOLIC BLOOD PRESSURE: 110 MMHG | BODY MASS INDEX: 25.74 KG/M2 | RESPIRATION RATE: 18 BRPM | WEIGHT: 136.24 LBS | TEMPERATURE: 98.1 F | DIASTOLIC BLOOD PRESSURE: 76 MMHG | OXYGEN SATURATION: 99 % | HEART RATE: 100 BPM

## 2024-08-08 DIAGNOSIS — M32.8 OTHER FORMS OF SYSTEMIC LUPUS ERYTHEMATOSUS, UNSPECIFIED ORGAN INVOLVEMENT STATUS (HCC): Primary | ICD-10-CM

## 2024-08-08 PROCEDURE — 96413 CHEMO IV INFUSION 1 HR: CPT

## 2024-08-08 RX ORDER — DIPHENHYDRAMINE HCL 25 MG
25 TABLET ORAL ONCE
OUTPATIENT
Start: 2024-08-09

## 2024-08-08 RX ORDER — SODIUM CHLORIDE 9 MG/ML
20 INJECTION, SOLUTION INTRAVENOUS ONCE
Status: COMPLETED | OUTPATIENT
Start: 2024-08-08 | End: 2024-08-08

## 2024-08-08 RX ORDER — SODIUM CHLORIDE 9 MG/ML
20 INJECTION, SOLUTION INTRAVENOUS ONCE
OUTPATIENT
Start: 2024-08-09

## 2024-08-08 RX ORDER — ACETAMINOPHEN 325 MG/1
650 TABLET ORAL ONCE
Status: COMPLETED | OUTPATIENT
Start: 2024-08-08 | End: 2024-08-08

## 2024-08-08 RX ORDER — DIPHENHYDRAMINE HCL 25 MG
25 TABLET ORAL ONCE
Status: COMPLETED | OUTPATIENT
Start: 2024-08-08 | End: 2024-08-08

## 2024-08-08 RX ORDER — ACETAMINOPHEN 325 MG/1
650 TABLET ORAL ONCE
OUTPATIENT
Start: 2024-08-09

## 2024-08-08 RX ADMIN — ACETAMINOPHEN 650 MG: 325 TABLET ORAL at 14:56

## 2024-08-08 RX ADMIN — BELIMUMAB 600 MG: 120 INJECTION, POWDER, LYOPHILIZED, FOR SOLUTION INTRAVENOUS at 15:54

## 2024-08-08 RX ADMIN — SODIUM CHLORIDE 20 ML/HR: 0.9 INJECTION, SOLUTION INTRAVENOUS at 14:55

## 2024-08-08 RX ADMIN — DIPHENHYDRAMINE HYDROCHLORIDE 25 MG: 25 TABLET ORAL at 14:56

## 2024-08-08 NOTE — LETTER
[facility]  [address]  [address]  [place] [address]  Dept: [phone]    August 8, 2024     Patient: Jenny Rodrigues   YOB: 1995   Date of Visit: 8/8/2024       To Whom it May Concern:    Jenny Rodrigues is under my professional care. She was seen in the hospital from 8/8/2024 to 08/08/24. She may return to school on 08/09/2024 without limitations.    If you have any questions or concerns, please don't hesitate to call.         Sincerely,          Ambika Parsons RN

## 2024-08-08 NOTE — LETTER
[facility]  [address]  [address]  [place] [address]  Dept: [phone]    August 12, 2024     Patient: Jenny Rodrigues   YOB: 1995   Date of Visit: 8/8/2024       To Whom it May Concern:    Jenny Rodrigues is under my professional care. She was seen in the hospital from 8/8/2024 to 08/08/24. She {Return to school/sport/work:[id number]}.    If you have any questions or concerns, please don't hesitate to call.         Sincerely,          Ambika Parsons RN

## 2024-08-13 ENCOUNTER — OFFICE VISIT (OUTPATIENT)
Dept: NEUROLOGY | Facility: CLINIC | Age: 29
End: 2024-08-13
Payer: COMMERCIAL

## 2024-08-13 VITALS
BODY MASS INDEX: 25.15 KG/M2 | HEIGHT: 61 IN | HEART RATE: 89 BPM | SYSTOLIC BLOOD PRESSURE: 100 MMHG | DIASTOLIC BLOOD PRESSURE: 78 MMHG | TEMPERATURE: 97.3 F | OXYGEN SATURATION: 99 % | WEIGHT: 133.2 LBS

## 2024-08-13 DIAGNOSIS — F41.0 GENERALIZED ANXIETY DISORDER WITH PANIC ATTACKS: ICD-10-CM

## 2024-08-13 DIAGNOSIS — E10.8 CONTROLLED DIABETES MELLITUS TYPE 1 WITH COMPLICATIONS (HCC): ICD-10-CM

## 2024-08-13 DIAGNOSIS — G43.109 MIGRAINE WITH AURA AND WITHOUT STATUS MIGRAINOSUS, NOT INTRACTABLE: Primary | ICD-10-CM

## 2024-08-13 DIAGNOSIS — E03.9 HYPOTHYROIDISM, UNSPECIFIED TYPE: ICD-10-CM

## 2024-08-13 DIAGNOSIS — F41.1 GENERALIZED ANXIETY DISORDER WITH PANIC ATTACKS: ICD-10-CM

## 2024-08-13 DIAGNOSIS — G62.9 NEUROPATHY: ICD-10-CM

## 2024-08-13 DIAGNOSIS — M32.9 LUPUS (HCC): ICD-10-CM

## 2024-08-13 DIAGNOSIS — G81.94 LEFT HEMIPARESIS (HCC): ICD-10-CM

## 2024-08-13 PROCEDURE — 99214 OFFICE O/P EST MOD 30 MIN: CPT | Performed by: PSYCHIATRY & NEUROLOGY

## 2024-08-13 NOTE — PROGRESS NOTES
Patient ID: Jenny Rodrigues is a 29 y.o. female.    Assessment/Plan:           Problem List Items Addressed This Visit          Endocrine    Hypothyroidism    Controlled diabetes mellitus type 1 with complications (HCC)       Nervous and Auditory    Neuropathy    Left hemiparesis (HCC)       Behavioral Health    Generalized anxiety disorder with panic attacks       Rheumatology    Lupus (HCC)     Other Visit Diagnoses       Migraine with aura and without status migrainosus, not intractable    -  Primary           Mrs. Rodrigues has presented to [facility] multiple sclerosis [place] for follow-up on chronic daily headaches with dysphagia.  Patient stated she has chronic headaches since her school years as she was prescribed Vicodin at that time.  Patient reports her headache involving right temporoparietal region which resembles hemicrania continua rather than migraine headaches as pain has been persistent for the years in the range of 2/10 up to 10/10.  Patient completed evaluation by ophthalmology with no signs or uveitis/episcleritis or any other retinal pathology noted considering patient reports of visual disturbances in the morning only which clears up by the early afternoon.  Patient continue following with ophthalmology as patient has been taking Plaquenil.    Patient has been recovering from recent flareup took place in June 2024 patient continue taking steroids with methotrexate injections and Plaquenil with Belimumab for lupus.  No new focal neurological deficit been described concerning patient previously had what I believe TIA with left-sided weakness.  Patient completed imaging of the brain and cervical spine with no intrinsic or pathology been noted, no signs of CNS demyelination been advised.    Patient may benefit from having spinal tap and conventional angiogram to rule out secondary vasculitis or neuro lupus patient presented with headache and intermittent neurological dysfunction.  Reviewed patient  would benefit from establishing with headache team as patient may safely benefit from using devices to help with her headaches including Cefaly and GammaCore.  Patient has been taking Aleve and Tylenol with minimal benefits for her headaches.  We reviewed patient serologic workup including B12 level 366, CRP 5.9, ESR was normal.    Patient is to [facility]up with [facility] within 6-8 months.      Subjective: Right-sided headache    HPI  Mrs. Rodrigues has presented to [facility] for follow-up on lupus and multiple neurological complaints in the setting of lupus and type 1 diabetes.  Patient described recent flareup of her lupus as she had developed butterfly rash on her face with multifocal arthralgia.  Patient continued describing brain fog and headaches.  Patient stated headaches has been persistent since childhood and getting worse during her flareups.  Patient describes headaches as pounding pain involving right side of her temporoparietal area.  Patient stated today is her headache 2/10 in severity but most of the time it is 10/10.  Patient also stated previously she had pericarditis which presented with bad chest pain.  Patient has intermittent hypertension which improves when she is sitting up with concern for diabetes related autonomic nervous system dysfunction.  Patient completed barium swallow with no significant pathology advised.  Imaging of the cervical spine been also completed and compared to 2020 1-2024 with normal findings advised.    Patient continue describing sharp pain involving left upper left lower extremity lasting 4-5 months as it improved in July 2024 and then symptoms came back.     Patient described developing upper respiratory infection with pneumonia due to viral infection requiring months longer treatment with antibacterial regimen as a result.  During this process patient was offered to discontinue methotrexate and any other means of immunosuppressive regimens  provided for lupus.  Patient is restarting her Belimumab second infusion coming shortly.     Patient describes having diffuse paresthesia which comes as shocklike is up like sensation involving upper lower extremities as well as the spine.  Patient stating she has electricity comes along her spine and she is bending her neck down which brought concern for Lhermitte sign.      MRI cervical spine with without contrast will be advised considering patient has autoimmune condition and she may develop flareup due to underlying infection.  We advised against steroid regimen due to type 1 diabetes which has been more stable over the last 5 years.     Patient is planning to travel within 1 month, she is going to [place] for a month.      The following portions of the patient's history were reviewed and updated as appropriate: She  has a past medical history of Abdominal pain, Anaphylaxis, Anxiety, Anxiety and depression, COVID-19 (12/2020), Delayed emergence from anesthesia (03/23/2023), Delirium, induced by drug, Depression, Diabetes mellitus ([facility]), Disease of thyroid gland, DKA, type 1 ([facility]) (05/11/2021), Eating disorder, Food intolerance, Fracture of fibula, Gilbert disease, Hashimoto's disease (02/21/2020), Head injury, Headache(784.0), Nasal congestion, PTSD (post-traumatic stress disorder), Rectal bleed, Seizures ([facility]), and Type 1 diabetes ([facility]ly [facility]).  She   Patient Active Problem List    Diagnosis Date Noted    Lhermitte's sign positive 04/19/2024    Paresthesia 04/19/2024    Tremor 04/19/2024    Oropharyngeal dysphagia 04/19/2024    Vestibular migraine 01/28/2024    Cognitive attention deficit 01/28/2024    Left hemiparesis (HCC) 01/26/2024    Lupus (HCC) 01/15/2024    Pre-conception counseling 10/06/2023    Other forms of systemic lupus erythematosus (HCC) 09/15/2023    Idiopathic hypotension 09/07/2023    Annual physical exam 06/20/2023    Pigmentation abnormality of skin 06/20/2023    Hearing loss of right ear 06/08/2023     Undifferentiated connective tissue disease (HCC) 06/08/2023    Delayed emergence from anesthesia 03/23/2023    Controlled diabetes mellitus type 1 with complications (HCC) 03/22/2023    Bruising 10/19/2022    Constipation 09/28/2022    Gastroesophageal reflux disease 09/26/2022    Other chest pain 02/22/2022    Physical deconditioning 12/21/2021    Right-sided back pain 12/21/2021    Verruca 09/23/2021    Nail abnormality 09/01/2021    Hypothyroidism 08/18/2021    Type 1 diabetes mellitus (HCC) 07/17/2021    Type 1 diabetes mellitus with hyperglycemia (HCC) 07/17/2021    Uncontrolled type 1 diabetes mellitus with hyperglycemia (HCC) 07/17/2021    Elevated prolactin level 07/14/2021    Fatty stools 06/29/2021    Left low back pain 06/29/2021    Hyperlipidemia 05/19/2021    Visual hallucinations 05/11/2021    Rheumatoid factor positive 04/29/2021    History of anesthesia complications 03/22/2021    Seizures ([facility])     Positive SOHAM (antinuclear antibody) 03/09/2021    Weakness generalized 02/11/2021    Low serum vitamin B12 02/11/2021    Arthralgia 02/11/2021    Odynophagia 02/09/2021    Bipolar affective disorder (HCC) 01/13/2021    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    Yeast infection 12/01/2020    Syncope     Chronic back pain 10/16/2020    Insulin pump status 09/30/2020    Muscle weakness 09/03/2020    Word finding difficulty 09/03/2020    Neuropathy 06/23/2020    Polyarthritis 06/23/2020    Secondary amenorrhea 06/23/2020    Vitamin D deficiency 06/19/2020    Paresthesia of left leg 04/21/2020    Hashimoto's thyroiditis 02/21/2020    Dysuria 02/21/2020    Visual changes 01/21/2020    Chronic pain of right knee 10/15/2019    Chronic abdominal pain 10/15/2019    Blood in the stool 10/15/2019    OCD (obsessive compulsive disorder) 09/26/2019    Activity intolerance 09/10/2019    Chronic post-traumatic stress disorder (PTSD) 08/12/2019    Generalized anxiety disorder with panic attacks 08/12/2019    Nausea and vomiting  "08/06/2019    Agitation 08/06/2019    Concussion without loss of consciousness 07/02/2019    Urinary frequency 06/06/2019    Abnormal stools 02/07/2019    Uncontrolled type 1 diabetes mellitus with hypoglycemia without coma (HCC) 01/29/2019    Abnormal liver function test 12/13/2018    Right sided abdominal pain 12/13/2018    Chronic fatigue and malaise 12/13/2018    Patellofemoral pain syndrome of right knee 09/26/2018     She  has a past surgical history that includes Wrist surgery; [place] tooth extraction; Nasal septoplasty w/ turbinoplasty; Knee surgery (Right, 07/06/2020); Sinus surgery; Turbinoplasty (N/A, 03/22/2021); Colonoscopy; Upper gastrointestinal endoscopy; Skin biopsy; and EGD (03/23/2023).  Her family history includes Alcohol abuse in her brother and father; Anxiety disorder in her mother; Arthritis in her mother; Cancer in her family; Cholelithiasis in her father; Cirrhosis in her father; Coronary artery disease in her father; Depression in her mother; Diabetes in her family, father, and mother; Diabetes type II in her mother; Eczema in her father; Hashimoto's thyroiditis in her sister; Hearing loss in her mother; Hyperlipidemia in her father and mother; Hypertension in her family, father, and mother; Migraines in her mother; Nephrolithiasis in her father; Sarcoidosis in her father; Thyroid disease in her sister; Varicose Veins in her mother.  She  reports that she has never smoked. She has never been exposed to tobacco smoke. She has never used smokeless tobacco. She reports current alcohol use. She reports that she does not use drugs.  Current Outpatient Medications   Medication Sig Dispense Refill    acetaminophen (TYLENOL) 325 mg tablet Take 3 tablets (975 mg total) by mouth every 8 (eight) hours as needed for mild pain or headaches      BD Insulin Syringe U/F 31G X 5/16\" 0.5 ML MISC Use as directly with weekly methotrexate injection. 100 each 0    Belimumab (BENLYSTA IV) Inject into a catheter " "in a vein every month to absorb continually Switching to monthly on 12/5      clindamycin (CLEOCIN T) 1 % lotion Apply 1 application. topically 2 (two) times a day      Cyanocobalamin 1000 MCG SUBL Place 1 tablet (1,000 mcg total) under the tongue daily 90 tablet 0    famotidine (PEPCID) 40 MG tablet Take 1 tablet (40 mg total) by mouth daily at bedtime 90 tablet 3    folic acid (FOLVITE) 1 mg tablet TAKE 1 TABLET BY MOUTH EVERY DAY 90 tablet 3    gabapentin (Neurontin) 600 MG tablet Take 1 tablet (600 mg total) by mouth daily 90 tablet 1    Glucagon (Gvoke HypoPen 2-Pack) 1 MG/0.2ML SOAJ 1 mg PRN for severe hypoglycemia 0.4 mL 0    Glucagon HCl (Glucagon Emergency) 1 MG/ML SOLR       levonorgestrel-ethinyl estradiol (NORDETTE) 0.15-30 MG-MCG per tablet Take 1 tablet by mouth daily 84 tablet 1    levothyroxine 25 mcg tablet Take 1 tablet (25 mcg total) by mouth daily 60 tablet 0    methotrexate 50 MG/2ML injection Inject 0.8 mL (20 mg total) under the skin once a week 8 mL 4    NovoLOG 100 UNIT/ML injection Up to 100 units per day with insulin pump 90 mL 3    Tresiba FlexTouch 100 units/mL injection pen Inject 32 Units under the skin daily 15 mL 2    EPINEPHrine (EPIPEN) 0.3 mg/0.3 mL SOAJ Inject 0.3 mL (0.3 mg total) into a muscle once for 1 dose 0.6 mL 0    hydroxychloroquine (PLAQUENIL) 200 mg tablet Take 1 tablet (200 mg total) by mouth 2 (two) times a day with meals 180 tablet 3    Insulin Pen Needle (BD PEN NEEDLE NASIM U/F) 32G X 4 MM MISC by Does not apply route 4 (four) times a day for 180 days 400 each 3     No current facility-administered medications for this visit.     Current Outpatient Medications on File Prior to Visit   Medication Sig    acetaminophen (TYLENOL) 325 mg tablet Take 3 tablets (975 mg total) by mouth every 8 (eight) hours as needed for mild pain or headaches    BD Insulin Syringe U/F 31G X 5/16\" 0.5 ML MISC Use as directly with weekly methotrexate injection.    Belimumab (BENLYSTA IV) " "Inject into a catheter in a vein every month to absorb continually Switching to monthly on 12/5    clindamycin (CLEOCIN T) 1 % lotion Apply 1 application. topically 2 (two) times a day    Cyanocobalamin 1000 MCG SUBL Place 1 tablet (1,000 mcg total) under the tongue daily    famotidine (PEPCID) 40 MG tablet Take 1 tablet (40 mg total) by mouth daily at bedtime    folic acid (FOLVITE) 1 mg tablet TAKE 1 TABLET BY MOUTH EVERY DAY    gabapentin (Neurontin) 600 MG tablet Take 1 tablet (600 mg total) by mouth daily    Glucagon (Gvoke HypoPen 2-Pack) 1 MG/0.2ML SOAJ 1 mg PRN for severe hypoglycemia    Glucagon HCl (Glucagon Emergency) 1 MG/ML SOLR     levonorgestrel-ethinyl estradiol (NORDETTE) 0.15-30 MG-MCG per tablet Take 1 tablet by mouth daily    levothyroxine 25 mcg tablet Take 1 tablet (25 mcg total) by mouth daily    methotrexate 50 MG/2ML injection Inject 0.8 mL (20 mg total) under the skin once a week    NovoLOG 100 UNIT/ML injection Up to 100 units per day with insulin pump    Tresiba FlexTouch 100 units/mL injection pen Inject 32 Units under the skin daily    EPINEPHrine (EPIPEN) 0.3 mg/0.3 mL SOAJ Inject 0.3 mL (0.3 mg total) into a muscle once for 1 dose    hydroxychloroquine (PLAQUENIL) 200 mg tablet Take 1 tablet (200 mg total) by mouth 2 (two) times a day with meals    Insulin Pen Needle (BD PEN NEEDLE NASIM U/F) 32G X 4 MM MISC by Does not apply route 4 (four) times a day for 180 days     No current facility-administered medications on file prior to visit.     She is allergic to shellfish-derived products - food allergy and insulin glargine..         Objective:    Blood pressure 100/78, pulse 89, temperature (!) 97.3 °F (36.3 °C), temperature source Temporal, height 5' 1\" (1.549 m), weight 60.4 kg (133 lb 3.2 oz), SpO2 99%, not currently breastfeeding.    Physical Exam    Neurological Exam  CONSTITUTIONAL: NAD, pleasant. NECK: supple, no lymphadenopathy, no thyromegaly, no JVD. CARDIOVASCULAR: RRR, normal " S1S2, no murmurs, no rubs. RESP: clear to auscultation bilaterally, no wheezes/rhonchi/rales. ABDOMEN: soft, non tender, non distended. SKIN: no rash or skin lesions. EXTREMITIES: no edema, pulses 2+bilaterally. PSYCH: appropriate mood and affect  NEUROLOGIC COMPREHENSIVE EXAM: Patient is oriented to person, place and time, NAD; appropriate affect. CN II, III, IV, V, VI, VII,VIII,IX,X,XI-XII intact with EOMI, PERRLA, OKN intact, VF grossly intact, fundi poorly visualized secondary to pupillary constriction; symmetric face noted. Motor: 5/5 UE/LE bilateral symmetric; Sensory: intact to light touch and pinprick bilaterally; normal vibration sensation feet bilaterally; Coordination within normal limits on FTN and [place] testing; DTR: 2/4 through, no Babinski, no clonus. Tandem gait is intact. Romberg: absent.    ROS:    Review of Systems   Constitutional: Negative.    HENT: Negative.     Eyes: Negative.    Respiratory: Negative.     Cardiovascular: Negative.    Gastrointestinal: Negative.    Endocrine: Negative.    Genitourinary: Negative.    Musculoskeletal: Negative.    Skin: Negative.    Allergic/Immunologic: Negative.    Neurological: Negative.    Hematological: Negative.    Psychiatric/Behavioral: Negative.     All other systems reviewed and are negative.

## 2024-08-14 LAB
DME PARACHUTE DELIVERY DATE ACTUAL: NORMAL
DME PARACHUTE DELIVERY DATE REQUESTED: NORMAL
DME PARACHUTE ITEM DESCRIPTION: NORMAL
DME PARACHUTE ITEM DESCRIPTION: NORMAL
DME PARACHUTE ORDER STATUS: NORMAL
DME PARACHUTE SUPPLIER NAME: NORMAL
DME PARACHUTE SUPPLIER PHONE: NORMAL

## 2024-08-29 ENCOUNTER — APPOINTMENT (OUTPATIENT)
Dept: RADIOLOGY | Age: 29
End: 2024-08-29
Payer: COMMERCIAL

## 2024-08-29 ENCOUNTER — OFFICE VISIT (OUTPATIENT)
Dept: URGENT CARE | Age: 29
End: 2024-08-29
Payer: COMMERCIAL

## 2024-08-29 VITALS
BODY MASS INDEX: 26.06 KG/M2 | HEART RATE: 90 BPM | RESPIRATION RATE: 18 BRPM | HEIGHT: 61 IN | DIASTOLIC BLOOD PRESSURE: 88 MMHG | SYSTOLIC BLOOD PRESSURE: 124 MMHG | WEIGHT: 138 LBS | TEMPERATURE: 98.1 F | OXYGEN SATURATION: 99 %

## 2024-08-29 DIAGNOSIS — M25.551 PAIN OF RIGHT HIP: Primary | ICD-10-CM

## 2024-08-29 DIAGNOSIS — M25.551 PAIN OF RIGHT HIP: ICD-10-CM

## 2024-08-29 LAB
SL AMB  POCT GLUCOSE, UA: 2000
SL AMB LEUKOCYTE ESTERASE,UA: NORMAL
SL AMB POCT BILIRUBIN,UA: NORMAL
SL AMB POCT BLOOD,UA: NORMAL
SL AMB POCT CLARITY,UA: CLEAR
SL AMB POCT COLOR,UA: YELLOW
SL AMB POCT KETONES,UA: 15
SL AMB POCT NITRITE,UA: NORMAL
SL AMB POCT PH,UA: 7
SL AMB POCT URINE HCG: NEGATIVE
SL AMB POCT URINE PROTEIN: NORMAL
SL AMB POCT UROBILINOGEN: 0.2

## 2024-08-29 PROCEDURE — 81002 URINALYSIS NONAUTO W/O SCOPE: CPT

## 2024-08-29 PROCEDURE — G0382 LEV 3 HOSP TYPE B ED VISIT: HCPCS | Performed by: PREVENTIVE MEDICINE

## 2024-08-29 PROCEDURE — S9083 URGENT CARE CENTER GLOBAL: HCPCS | Performed by: PREVENTIVE MEDICINE

## 2024-08-29 PROCEDURE — 81025 URINE PREGNANCY TEST: CPT

## 2024-08-29 PROCEDURE — 73502 X-RAY EXAM HIP UNI 2-3 VIEWS: CPT

## 2024-08-29 PROCEDURE — 87086 URINE CULTURE/COLONY COUNT: CPT

## 2024-08-29 NOTE — PROGRESS NOTES
[facility]        NAME: Jenny Rodrigues is a 29 y.o. female  : 1995    MRN: [id number]  DATE: 2024  TIME: 7:04 PM    Assessment and Plan   Pain of right hip [M25.551]  1. Pain of right hip  XR hip/pelv 2-3 vws right if performed    POCT urine dip    POCT urine HCG    Ambulatory Referral to Orthopedic Surgery    Urine culture    Urine culture            Patient Instructions   The final xray result will appear in your mychart;   Ice as needed.  Acetaminophen for pain and inflammation.  Follow-up with orthopedics.  PCP follow-up in 3-5 days  Proceed to the ER if symptoms worsen.    If tests have been performed at [facility], our office will contact you with results if changes need to be made to the care plan discussed with you at the visit.  You can review your full results on [facility] MyChart.    Chief Complaint     Chief Complaint   Patient presents with    Hip Pain     Patient presents with complaints of right hip pain starting 14 days ago, had a fall 2 days ago that she believes was from the pain and a bump on her lower back right side.          History of Present Illness       29-year-old female with past med history of lupus, presents for atraumatic right hip pain x 2 to 3 weeks.  Patient admits symptom onset while laying in bed.  Symptoms have been worsening with severity of pain.  Pain is described as sharp.  Experiences intermittent radiation anteriorly towards the pelvis.  Denies any prior hip injuries.  Denies any changes in activity and is primarily sedentary.  Patient also admits to intermittent abdominal pain, dysuria, urinary frequency, nausea which is baseline for her, unsure if there is any changes.    Hip Pain         Review of Systems   Review of Systems   Constitutional:  Negative for chills and fever.   Gastrointestinal:  Negative for abdominal pain, nausea and vomiting.   Genitourinary:  Positive for pelvic pain. Negative for dysuria, flank pain, hematuria and urgency.  "  Musculoskeletal:  Positive for gait problem.         Current Medications       Current Outpatient Medications:     acetaminophen (TYLENOL) 325 mg tablet, Take 3 tablets (975 mg total) by mouth every 8 (eight) hours as needed for mild pain or headaches, Disp: , Rfl:     BD Insulin Syringe U/F 31G X 5/16\" 0.5 ML MISC, Use as directly with weekly methotrexate injection., Disp: 100 each, Rfl: 0    Belimumab (BENLYSTA IV), Inject into a catheter in a vein every month to absorb continually Switching to monthly on 12/5, Disp: , Rfl:     clindamycin (CLEOCIN T) 1 % lotion, Apply 1 application. topically 2 (two) times a day, Disp: , Rfl:     Cyanocobalamin 1000 MCG SUBL, Place 1 tablet (1,000 mcg total) under the tongue daily, Disp: 90 tablet, Rfl: 0    famotidine (PEPCID) 40 MG tablet, Take 1 tablet (40 mg total) by mouth daily at bedtime, Disp: 90 tablet, Rfl: 3    folic acid (FOLVITE) 1 mg tablet, TAKE 1 TABLET BY MOUTH EVERY DAY, Disp: 90 tablet, Rfl: 3    gabapentin (Neurontin) 600 MG tablet, Take 1 tablet (600 mg total) by mouth daily, Disp: 90 tablet, Rfl: 1    Glucagon (Gvoke HypoPen 2-Pack) 1 MG/0.2ML SOAJ, 1 mg PRN for severe hypoglycemia, Disp: 0.4 mL, Rfl: 0    Glucagon HCl (Glucagon Emergency) 1 MG/ML SOLR, , Disp: , Rfl:     levothyroxine 25 mcg tablet, Take 1 tablet (25 mcg total) by mouth daily, Disp: 60 tablet, Rfl: 0    methotrexate 50 MG/2ML injection, Inject 0.8 mL (20 mg total) under the skin once a week, Disp: 8 mL, Rfl: 4    NovoLOG 100 UNIT/ML injection, Up to 100 units per day with insulin pump, Disp: 90 mL, Rfl: 3    Tresiba FlexTouch 100 units/mL injection pen, Inject 32 Units under the skin daily, Disp: 15 mL, Rfl: 2    EPINEPHrine (EPIPEN) 0.3 mg/0.3 mL SOAJ, Inject 0.3 mL (0.3 mg total) into a muscle once for 1 dose, Disp: 0.6 mL, Rfl: 0    hydroxychloroquine (PLAQUENIL) 200 mg tablet, Take 1 tablet (200 mg total) by mouth 2 (two) times a day with meals, Disp: 180 tablet, Rfl: 3    Insulin Pen " Needle (BD PEN NEEDLE NASIM U/F) 32G X 4 MM MISC, by Does not apply route 4 (four) times a day for 180 days, Disp: 400 each, Rfl: 3    levonorgestrel-ethinyl estradiol (NORDETTE) 0.15-30 MG-MCG per tablet, Take 1 tablet by mouth daily, Disp: 84 tablet, Rfl: 1    Current Allergies     Allergies as of 08/29/2024 - Reviewed 08/29/2024   Allergen Reaction Noted    Shellfish-derived products - food allergy Anaphylaxis 06/21/2024    Insulin glargine Other (See Comments) 07/02/2019            The following portions of the patient's history were reviewed and updated as appropriate: allergies, current medications, past family history, past medical history, past social history, past surgical history and problem list.     Past Medical History:   Diagnosis Date    Abdominal pain     Anaphylaxis     Anxiety     Anxiety and depression     COVID-19 12/2020    Delayed emergence from anesthesia 03/23/2023    Severe episode of emergence delirium (confused, combative) after MAC anesthetic on 03/2023 for EGD. Received versed 2mg, >50mcg precedex, 5mg IV haldol, and 50mcg fentanyl. Waxing and waning episodes of agitation. Any future anesthetics should NOT be done at [facility].    Delirium, induced by drug     anesthesia    Depression     Diabetes mellitus (HCC)     Disease of thyroid gland     Hashimoto    DKA, type 1 (HCC) 05/11/2021    Eating disorder     history of anorexia/bulemia [id number]    Food intolerance     Fracture of fibula     R Salter I    Gilbert disease     Hashimoto's disease 02/21/2020    Head injury     Headache(784.0)     Nasal congestion     PTSD (post-traumatic stress disorder)     Rectal bleed     Seizures (HCC)     Type 1 diabetes (HCC)        Past Surgical History:   Procedure Laterality Date    COLONOSCOPY      EGD  03/23/2023    KNEE SURGERY Right 07/06/2020    NASAL SEPTOPLASTY W/ TURBINOPLASTY      SINUS SURGERY      SKIN BIOPSY      TURBINOPLASTY N/A 03/22/2021    Procedure: TURBINOPLASTY;  Surgeon: Jn  "MD Rocky;  Location: [facility];  Service: ENT    UPPER GASTROINTESTINAL ENDOSCOPY      WISDOM TOOTH EXTRACTION      WRIST SURGERY      left; Excision of ganglion       Family History   Problem Relation Age of Onset    Hypertension Mother     Migraines Mother         Headache    Diabetes type II Mother     Varicose Veins Mother     Hyperlipidemia Mother     Diabetes Mother     Arthritis Mother     Depression Mother     Hearing loss Mother     Anxiety disorder Mother     Eczema Father     Cholelithiasis Father     Hypertension Father     Sarcoidosis Father         Liver    Hyperlipidemia Father     Diabetes Father     Coronary artery disease Father     Nephrolithiasis Father     Cirrhosis Father     Alcohol abuse Father     Thyroid disease Sister     Hashimoto's thyroiditis Sister     Alcohol abuse Brother     Cancer Family     Diabetes Family     Hypertension Family          Medications have been verified.        Objective   /88   Pulse 90   Temp 98.1 °F (36.7 °C) (Tympanic)   Resp 18   Ht 5' 1\" (1.549 m)   Wt 62.6 kg (138 lb)   SpO2 99%   BMI 26.07 kg/m²   No LMP recorded.       Physical Exam     Physical Exam  Vitals and nursing note reviewed.   Constitutional:       General: She is not in acute distress.     Appearance: She is not toxic-appearing.   HENT:      Head: Normocephalic and atraumatic.   Eyes:      Conjunctiva/sclera: Conjunctivae normal.   Pulmonary:      Effort: Pulmonary effort is normal.   Musculoskeletal:      Comments: Posterior tibialis pulse 2+ bilaterally  Subjective tenderness to palpation diffusely of hip  Pain with passive range of motion in all directions  Gait abnormal   Skin:     Findings: No bruising or erythema.   Neurological:      Mental Status: She is alert.   Psychiatric:         Mood and Affect: Mood normal.         Behavior: Behavior normal.                   "

## 2024-08-29 NOTE — PATIENT INSTRUCTIONS
Patient Instructions   The final xray result will appear in your mychart;   Ice as needed.  Acetaminophen for pain and inflammation.  Follow-up with orthopedics.  PCP follow-up in 3-5 days  Proceed to the ER if symptoms worsen.    If tests have been performed at [facility], our office will contact you with results if changes need to be made to the care plan discussed with you at the visit.  You can review your full results on [facility] MyChart.

## 2024-08-30 ENCOUNTER — NURSE TRIAGE (OUTPATIENT)
Age: 29
End: 2024-08-30

## 2024-08-30 DIAGNOSIS — M25.551 PAIN OF RIGHT HIP: Primary | ICD-10-CM

## 2024-08-30 NOTE — TELEPHONE ENCOUNTER
Called pt to make her aware MRI was approved. Will update her when we get results. Pt states she also scheduled consult with ortho next week.

## 2024-08-30 NOTE — TELEPHONE ENCOUNTER
"Reason for Disposition  • Hip pain is a chronic symptom (recurrent or ongoing AND present > 4 weeks)    Answer Assessment - Initial Assessment Questions  1. LOCATION and RADIATION: \"Where is the pain located?\"       Right hip- starts in back and radiates to hip  2. QUALITY: \"What does the pain feel like?\"  (e.g., sharp, dull, aching, burning)      Sharp  3. SEVERITY: \"How bad is the pain?\" \"What does it keep you from doing?\"   (Scale 1-10; or mild, moderate, severe)    -  MILD (1-3): doesn't interfere with normal activities     -  MODERATE (4-7): interferes with normal activities (e.g., work or school) or awakens from sleep, limping     -  SEVERE (8-10): excruciating pain, unable to do any normal activities, unable to walk      Intermittent- varies in intensity  4. ONSET: \"When did the pain start?\" \"Does it come and go, or is it there all the time?\"      2.5-3 weeks   5. WORK OR EXERCISE: \"Has there been any recent work or exercise that involved this part of the body?\"       No  6. CAUSE: \"What do you think is causing the hip pain?\"       Unsure- possible lupus flare  7. AGGRAVATING FACTORS: \"What makes the hip pain worse?\" (e.g., walking, climbing stairs, running)      None specified  8. OTHER SYMPTOMS: \"Do you have any other symptoms?\" (e.g., back pain, pain shooting down leg,  fever, rash)      No additional symptoms at this time   Denies redness/warmth/swelling    Protocols used: Hip Pain-ADULT-[place]    "

## 2024-08-30 NOTE — TELEPHONE ENCOUNTER
Covering for Dr. Tanner    Had R hip x-ray yesterday, worsening small sclerotic focus in femoral neck. With SLE dx, want to make sure no evidence of AVN. Will order MRI, please let her know to schedule this so that insurance can approve

## 2024-08-30 NOTE — TELEPHONE ENCOUNTER
Pt of Dr. Tanner with hx of [facility]. LOV 6/12/2024. F/u scheduled 10/7/2024.    Pt calling c/o right hip pain. Pt was evaluated in [facility] for the same yesterday and it was recommended she call rheumatology. No labs drawn, no meds prescribed. Pt experiencing right hip pain which started about 2.5-3 weeks ago. The pain is sharp and varies in intensity but is severe at times. She denies any obvious swelling, warmth or redness to the joint. She denies any pain or swelling in any other joints. She has been compliant with her weekly methotrexate, hydroxychloroquine and benlysta infusions. She is due for Benlysta infusion again 9/13. She does note that this feels different than a typical flare and has never experienced hip pain with a flare in the past. She has been alternating tylenol/motrin ATC which has provided some (but not complete) relief. Pt seeking recommendations for further workup and/or managing pain.

## 2024-08-30 NOTE — TELEPHONE ENCOUNTER
Called and spoke with pt to inform of Dr. RODRÍGUEZ's message. Provided central scheduling number. She will call to schedule now.

## 2024-08-31 DIAGNOSIS — M79.7 FIBROMYALGIA: ICD-10-CM

## 2024-08-31 LAB — BACTERIA UR CULT: NORMAL

## 2024-09-02 RX ORDER — GABAPENTIN 600 MG/1
600 TABLET ORAL DAILY
Qty: 90 TABLET | Refills: 1 | Status: SHIPPED | OUTPATIENT
Start: 2024-09-02

## 2024-09-04 ENCOUNTER — TELEPHONE (OUTPATIENT)
Dept: PSYCHIATRY | Facility: CLINIC | Age: 29
End: 2024-09-04

## 2024-09-04 ENCOUNTER — APPOINTMENT (OUTPATIENT)
Dept: LAB | Age: 29
End: 2024-09-04
Payer: COMMERCIAL

## 2024-09-04 ENCOUNTER — HOSPITAL ENCOUNTER (OUTPATIENT)
Dept: RADIOLOGY | Age: 29
Discharge: HOME | End: 2024-09-04
Payer: COMMERCIAL

## 2024-09-04 ENCOUNTER — OFFICE VISIT (OUTPATIENT)
Dept: OBGYN CLINIC | Facility: CLINIC | Age: 29
End: 2024-09-04
Payer: COMMERCIAL

## 2024-09-04 VITALS — WEIGHT: 138 LBS | BODY MASS INDEX: 26.06 KG/M2 | HEIGHT: 61 IN

## 2024-09-04 DIAGNOSIS — M25.551 PAIN OF RIGHT HIP: ICD-10-CM

## 2024-09-04 DIAGNOSIS — M32.9 LUPUS (HCC): ICD-10-CM

## 2024-09-04 DIAGNOSIS — M32.9 LUPUS (HCC): Primary | ICD-10-CM

## 2024-09-04 DIAGNOSIS — Z00.6 ENCOUNTER FOR EXAMINATION FOR NORMAL COMPARISON OR CONTROL IN CLINICAL RESEARCH PROGRAM: ICD-10-CM

## 2024-09-04 DIAGNOSIS — M35.9 UNDIFFERENTIATED CONNECTIVE TISSUE DISEASE (HCC): ICD-10-CM

## 2024-09-04 DIAGNOSIS — Z11.4 SCREENING FOR HIV (HUMAN IMMUNODEFICIENCY VIRUS): ICD-10-CM

## 2024-09-04 DIAGNOSIS — E78.5 HYPERLIPIDEMIA, UNSPECIFIED HYPERLIPIDEMIA TYPE: ICD-10-CM

## 2024-09-04 DIAGNOSIS — E10.649 UNCONTROLLED TYPE 1 DIABETES MELLITUS WITH HYPOGLYCEMIA WITHOUT COMA (HCC): ICD-10-CM

## 2024-09-04 LAB
BACTERIA UR QL AUTO: ABNORMAL /HPF
BASOPHILS # BLD AUTO: 0.04 THOUSANDS/ÂΜL (ref 0–0.1)
BASOPHILS NFR BLD AUTO: 0 % (ref 0–1)
BILIRUB UR QL STRIP: NEGATIVE
C3 SERPL-MCNC: 122 MG/DL (ref 87–200)
C4 SERPL-MCNC: 19 MG/DL (ref 19–52)
CHOLEST SERPL-MCNC: 224 MG/DL
CLARITY UR: CLEAR
COLOR UR: COLORLESS
CREAT UR-MCNC: 38.4 MG/DL
CREAT UR-MCNC: 38.4 MG/DL
CRP SERPL QL: 9.4 MG/L
EOSINOPHIL # BLD AUTO: 0.07 THOUSAND/ÂΜL (ref 0–0.61)
EOSINOPHIL NFR BLD AUTO: 1 % (ref 0–6)
ERYTHROCYTE [DISTWIDTH] IN BLOOD BY AUTOMATED COUNT: 12.4 % (ref 11.6–15.1)
ERYTHROCYTE [SEDIMENTATION RATE] IN BLOOD: 12 MM/HOUR (ref 0–19)
EST. AVERAGE GLUCOSE BLD GHB EST-MCNC: 177 MG/DL
GLUCOSE UR STRIP-MCNC: ABNORMAL MG/DL
HBA1C MFR BLD: 7.8 %
HCT VFR BLD AUTO: 38.1 % (ref 34.8–46.1)
HDLC SERPL-MCNC: 73 MG/DL
HGB BLD-MCNC: 12.5 G/DL (ref 11.5–15.4)
HGB UR QL STRIP.AUTO: NEGATIVE
HIV 1+2 AB+HIV1 P24 AG SERPL QL IA: NORMAL
HIV 2 AB SERPL QL IA: NORMAL
HIV1 AB SERPL QL IA: NORMAL
HIV1 P24 AG SERPL QL IA: NORMAL
IMM GRANULOCYTES # BLD AUTO: 0.09 THOUSAND/UL (ref 0–0.2)
IMM GRANULOCYTES NFR BLD AUTO: 1 % (ref 0–2)
KETONES UR STRIP-MCNC: ABNORMAL MG/DL
LDLC SERPL CALC-MCNC: 132 MG/DL (ref 0–100)
LEUKOCYTE ESTERASE UR QL STRIP: ABNORMAL
LYMPHOCYTES # BLD AUTO: 1.83 THOUSANDS/ÂΜL (ref 0.6–4.47)
LYMPHOCYTES NFR BLD AUTO: 19 % (ref 14–44)
MCH RBC QN AUTO: 29.6 PG (ref 26.8–34.3)
MCHC RBC AUTO-ENTMCNC: 32.8 G/DL (ref 31.4–37.4)
MCV RBC AUTO: 90 FL (ref 82–98)
MICROALBUMIN UR-MCNC: <7 MG/L
MONOCYTES # BLD AUTO: 0.65 THOUSAND/ÂΜL (ref 0.17–1.22)
MONOCYTES NFR BLD AUTO: 7 % (ref 4–12)
NEUTROPHILS # BLD AUTO: 7.19 THOUSANDS/ÂΜL (ref 1.85–7.62)
NEUTS SEG NFR BLD AUTO: 72 % (ref 43–75)
NITRITE UR QL STRIP: NEGATIVE
NON-SQ EPI CELLS URNS QL MICRO: ABNORMAL /HPF
NONHDLC SERPL-MCNC: 151 MG/DL
NRBC BLD AUTO-RTO: 0 /100 WBCS
PH UR STRIP.AUTO: 5.5 [PH]
PLATELET # BLD AUTO: 229 THOUSANDS/UL (ref 149–390)
PMV BLD AUTO: 12.3 FL (ref 8.9–12.7)
PROT UR STRIP-MCNC: NEGATIVE MG/DL
PROT UR-MCNC: 5.9 MG/DL
PROT/CREAT UR: 0.2 MG/G{CREAT} (ref 0–0.1)
RBC # BLD AUTO: 4.23 MILLION/UL (ref 3.81–5.12)
RBC #/AREA URNS AUTO: ABNORMAL /HPF
SP GR UR STRIP.AUTO: 1.03 (ref 1–1.03)
TRIGL SERPL-MCNC: 95 MG/DL
UROBILINOGEN UR STRIP-ACNC: <2 MG/DL
WBC # BLD AUTO: 9.87 THOUSAND/UL (ref 4.31–10.16)
WBC #/AREA URNS AUTO: ABNORMAL /HPF

## 2024-09-04 PROCEDURE — 82043 UR ALBUMIN QUANTITATIVE: CPT

## 2024-09-04 PROCEDURE — 86140 C-REACTIVE PROTEIN: CPT

## 2024-09-04 PROCEDURE — 73700 CT LOWER EXTREMITY W/O DYE: CPT

## 2024-09-04 PROCEDURE — 83036 HEMOGLOBIN GLYCOSYLATED A1C: CPT

## 2024-09-04 PROCEDURE — 85652 RBC SED RATE AUTOMATED: CPT

## 2024-09-04 PROCEDURE — 80061 LIPID PANEL: CPT

## 2024-09-04 PROCEDURE — 86225 DNA ANTIBODY NATIVE: CPT

## 2024-09-04 PROCEDURE — 3051F HG A1C>EQUAL 7.0%<8.0%: CPT | Performed by: FAMILY MEDICINE

## 2024-09-04 PROCEDURE — 86160 COMPLEMENT ANTIGEN: CPT

## 2024-09-04 PROCEDURE — 99204 OFFICE O/P NEW MOD 45 MIN: CPT | Performed by: FAMILY MEDICINE

## 2024-09-04 PROCEDURE — 82570 ASSAY OF URINE CREATININE: CPT

## 2024-09-04 PROCEDURE — 85025 COMPLETE CBC W/AUTO DIFF WBC: CPT

## 2024-09-04 PROCEDURE — 36415 COLL VENOUS BLD VENIPUNCTURE: CPT

## 2024-09-04 PROCEDURE — 87389 HIV-1 AG W/HIV-1&-2 AB AG IA: CPT

## 2024-09-04 PROCEDURE — 3061F NEG MICROALBUMINURIA REV: CPT | Performed by: FAMILY MEDICINE

## 2024-09-04 NOTE — LETTER
September 4, 2024     Patient: Jenny Rodrigues  YOB: 1995  Date of Visit: 9/4/2024      To Whom it May Concern:    Jenny Rodrigues is under my professional care. Jenny was seen in my office on 9/4/2024.    If you have any questions or concerns, please don't hesitate to call.         Sincerely,          Jinny Fuller,         CC: No Recipients

## 2024-09-04 NOTE — PROGRESS NOTES
Assessment:     1. Lupus (HCC)  CBC and differential    Walker    Sedimentation rate, automated    C-reactive protein    CT lower extremity wo contrast right      2. Pain of right hip  Ambulatory Referral to Orthopedic Surgery    CBC and differential    Walker    Sedimentation rate, automated    C-reactive protein    CT lower extremity wo contrast right        Orders Placed This Encounter   Procedures    Walker    CT lower extremity wo contrast right    CBC and differential    Sedimentation rate, automated    C-reactive protein        Impression:   Right hip pain likely secondary to concerns for AVN versus subchondral fracture.      Pertinent past medical history  Lupus  Type 1 insulin-dependent diabetes    Conservative Management   We discussed different treatment options:  Reviewed care now documentation completed on 08/29/2024.  Treatment plan consisted of right hip x-ray, urine studies, referral to Ortho/sports medicine  Reviewed rheumatology documentation completed on 06/12/2024.  Treatment plan consisted of labs as well as femur MRI with and without contrast  Ice or Heat Therapy as needed 1-2 times daily for 10-20 minutes. As tolerated.   Over the counter Tylenol and/or NSAIDs  as needed based off your Past Medical Hx. Please follow product label for dosing and maximum limits.    Walker provided for ambulation  Pending labs from rheumatology did not include CBC with differential.  This was not included as well as ESR and CRP.  Red flags were reviewed and when to seek immediate medical attention.  Such as fever or chills.  Pending CT right lower extremity for further evaluation of the femoral head and neck        Imaging   Reviewed prior xrays. These were reviewed in office with patient today.   08/29/2024 right hip x-ray:   IMPRESSION:  Small lucent focus with sclerotic margin slightly more prominent than prior study in the femoral neck. Patient has a history of injury which may be the etiology of pain.  Further imaging with CT or MRI if persistent pain per Ortho.  No acute osseous abnormality.  Pending right femur MRI with and without contrast  Pending CT lower extremity secondary to difficulty with ambulation requiring walker    Procedure  Not appropriate at this time.     Shared decision making, patient agreeable to plan.      Return for Follow up after completion of advanced imaging.    HPI:   Jenny Rodrigues is a 29 y.o. female  who presents for evaluation of   Chief Complaint   Patient presents with    Right Hip - Clicking, Pain     Pain in back of hip is sharp   Burning in front of hip  Pain goes up to a 7 by the end of the end        Occupation: Job entails mostly sitting  Injury Related: No     Onset/Mechanism: Patient developed right hip pain around 2 to 3 weeks ago.  Pain has been worsening.  Denies any trauma..  Location: Anterior groin.  Severity: Current severity: 3-4/10. Max severity: 8-9/10.  Pain described as: Achy, burning, sharp  Radiation: Denies pain radiating down leg..  Provocative: Difficulty walking/weightbearing at times.  Associated symptoms: Feelings of instability.    HX of fracture of affected limb. Approx. 20 years ago.  Fractured a a portion of the pelvis  Denies any surgical history of affected limb.      Summary of treatment to-date:   Aleve /Tylenol  Topical gel/patches      Following History Reviewed and Updated     Past Medical History:   Diagnosis Date    Abdominal pain     Anaphylaxis     Anxiety     Anxiety and depression     COVID-19 12/2020    Delayed emergence from anesthesia 03/23/2023    Severe episode of emergence delirium (confused, combative) after MAC anesthetic on 03/2023 for EGD. Received versed 2mg, >50mcg precedex, 5mg IV haldol, and 50mcg fentanyl. Waxing and waning episodes of agitation. Any future anesthetics should NOT be done at [facility].    Delirium, induced by drug     anesthesia    Depression     Diabetes mellitus (HCC)     Disease of thyroid gland      Hashimoto    DKA, type 1 (HCC) 05/11/2021    Eating disorder     history of anorexia/bulemia 1102-5684    Food intolerance     Fracture of fibula     R Salter I    Gilbert disease     Hashimoto's disease 02/21/2020    Head injury     Headache(784.0)     Nasal congestion     PTSD (post-traumatic stress disorder)     Rectal bleed     Seizures (HCC)     Type 1 diabetes (HCC)      Past Surgical History:   Procedure Laterality Date    COLONOSCOPY      EGD  03/23/2023    KNEE SURGERY Right 07/06/2020    NASAL SEPTOPLASTY W/ TURBINOPLASTY      SINUS SURGERY      SKIN BIOPSY      TURBINOPLASTY N/A 03/22/2021    Procedure: TURBINOPLASTY;  Surgeon: Jn Hidalgo MD;  Location: [facility];  Service: ENT    UPPER GASTROINTESTINAL ENDOSCOPY      WISDOM TOOTH EXTRACTION      WRIST SURGERY      left; Excision of ganglion     Family History   Problem Relation Age of Onset    Hypertension Mother     Migraines Mother         Headache    Diabetes type II Mother     Varicose Veins Mother     Hyperlipidemia Mother     Diabetes Mother     Arthritis Mother     Depression Mother     Hearing loss Mother     Anxiety disorder Mother     Eczema Father     Cholelithiasis Father     Hypertension Father     Sarcoidosis Father         Liver    Hyperlipidemia Father     Diabetes Father     Coronary artery disease Father     Nephrolithiasis Father     Cirrhosis Father     Alcohol abuse Father     Thyroid disease Sister     Hashimoto's thyroiditis Sister     Alcohol abuse Brother     Cancer Family     Diabetes Family     Hypertension Family        Social History     Substance and Sexual Activity   Alcohol Use Yes    Comment: rarely     Social History     Substance and Sexual Activity   Drug Use Never     Social History     Tobacco Use   Smoking Status Never    Passive exposure: Never   Smokeless Tobacco Never   Tobacco Comments    Tobacco smoke exposure (Father smokes cigars)       Social Determinants of Health     Tobacco Use: Low Risk   (9/4/2024)    Patient History     Smoking Tobacco Use: Never     Smokeless Tobacco Use: Never     Passive Exposure: Never   Alcohol Use: Not At Risk (12/21/2021)    AUDIT-C     Frequency of Alcohol Consumption: Never     Average Number of Drinks: Not on file     Frequency of Binge Drinking: Never   Financial Resource Strain: Low Risk  (7/8/2023)    [facility]    Overall Financial Resource Strain (CARDIA)     Difficulty of Paying Living Expenses: Not hard at all   Food Insecurity: No Food Insecurity (1/16/2024)    Hunger Vital Sign     Worried About Running Out of Food in the Last Year: Never true     Ran Out of Food in the Last Year: Never true   Transportation Needs: No Transportation Needs (1/16/2024)    PRAPARE - Transportation     Lack of Transportation (Medical): No     Lack of Transportation (Non-Medical): No   Physical Activity: Insufficiently Active (8/7/2020)    Exercise Vital Sign     Days of Exercise per Week: 7 days     Minutes of Exercise per Session: 10 min   Stress: Stress Concern Present (8/7/2020)    [employer] - Occupational Stress Questionnaire     Feeling of Stress : Rather much   Social Connections: Unknown (8/7/2020)    Social Connection and Isolation Panel [NHANES]     Frequency of Communication with Friends and Family: More than three times a week     Frequency of Social Gatherings with Friends and Family: Not on file     Attends [employer] Services: Not on file     Active Member of Clubs or Organizations: No     Attends Club or Organization Meetings: Never     Marital Status: Never    Intimate Partner Violence: Not At Risk (7/8/2023)    [facility]    Humiliation, Afraid, Rape, and Kick questionnaire     Fear of Current or Ex-Partner: No     Emotionally Abused: No     Physically Abused: No     Sexually Abused: No   Depression: At risk  "(3/9/2021)    PHQ-2     PHQ-2 Score: 6   Housing Stability: Unknown (1/16/2024)    Housing Stability Vital Sign     Unable to Pay for Housing in the Last Year: No     Number of Times Moved in the Last Year: Not on file     Homeless in the Last Year: No   Utilities: Not At Risk (1/16/2024)    [facility] Utilities     Threatened with loss of utilities: No   Health Literacy: Not on file        Allergies   Allergen Reactions    Shellfish-Derived Products - Food Allergy Anaphylaxis    Insulin Glargine Other (See Comments)     LANTUS BRAND -Burning and redness under skin        Review of Systems      Review of Systems     Review of Systems   Constitutional: Negative for chills and fever.   HENT: Negative for drooling and sneezing.    Eyes: Negative for redness.   Respiratory: Negative for cough and wheezing.    Gastrointestinal: Negative for vomiting.   Psychiatric/Behavioral: Negative for behavioral problems. The patient is not nervous/anxious.      All other systems negative.   Physical Exam   Physical Exam    Vitals and nursing note reviewed.  Constitutional:   Appearance. Normal Appearance.  Ht 5' 1\" (1.549 m)   Wt 62.6 kg (138 lb)   BMI 26.07 kg/m²     Body mass index is 26.07 kg/m².   HENT:  Head: Atraumatic.  Nose: Nose normal  Eyes: Conjunctiva/sclera: Conjunctivae normal.  Cardiovascular:   Rate and Rhythm: Bilateral equal distal pulses  Pulmonary:   Effort: Pulmonary effort is normal  Skin:   General: Skin is warm and dry.  Neurological:   General: No focal deficit present.  Mental Status: Alert and oriented to person, place, and time.   Psychiatric:   Mood and Affect: mood normal.  Behavior: Behavior normal     Musculoskeletal Exam     Ortho Exam       B/L HIP and BACK   Patient is unable to sit on the right ischium without discomfort  Inspection:  Gait Gross deformity Erythema Warmth   Antalgic  Negative       TENDERNESS:    LUMBAR Range of Motion:  Grossly intact  Flexion Extension Sidebending Rotation       " "    HIP Range of Motion:  Hip Supine Supine Prone Prone   Flexion ER IR ER IR   Limited hip range of motion with discomfort         DERMATOMAL SENSATION:  L1 L2 L3 L4 L5 S1   Intact Intact Intact Intact Intact Intact     STRENGTH (bilateral):  Hip flexion Knee Flexion Knee extension Foot dorsiflexion Great toe extension Foot plantarflexion   3/5 with pain in the groin 3/5 with pain in the groin 3/5 with pain in the groin 5 out of 5 5 out of 5 5 out of 5     SPECIAL TESTS:   B/L Hip    Unable to complete secondary to patient's pain  Logroll PILLO WORLEY Galen's Popliteal angle Supine straight leg Prone straight leg         N/A       Special Tests:   Pete test Bicycle test Adductor squeeze Piriformis TEST:   GAENSLIN'S TEST:  Pubalgia   Supine, unaffected hip is hyperflexed Supine, opposite active b/l hip flexion / extension  Supine, hips flexed 45, active activation of adductors  Lies on unaffected hip with knees flexed and abducts against resistance  Hyperflexed unaffected hip, extended hip has downward force applied  Lying supine, perform sit-up               Neurovascular:  Sensation to light touch Posterior tibial artery   Intact and equal bilaterally Intact and equal bilaterally     (Test not completed if space left blank )           Procedures       Portions of the record may have been created with voice recognition software. Occasional wrong word or \"sound alike\" substitutions may have occurred due to the inherent limitations of voice recognition software. Please review the chart carefully and recognize, using context, where substitutions/typographical errors may have occurred.   "

## 2024-09-04 NOTE — TELEPHONE ENCOUNTER
Not on active med list. Pt stated she is going to be travel out of the country and needs a letter stating what and why she is taking the medication and that it is for personal use only.    Reason for call:   [x] Refill   [] Prior Auth  [] Other:     Office:   [] PCP/Provider -   [x] Specialty/Provider - Psychiatry    Medication: LORazepam (Ativan) 0.5 mg tablet Take 1 tablet (0.5 mg total) by mouth 2 (two) times a day as needed for anxiety      Pharmacy: [facility]/pharmacy #[id number] - [place] - [address] [place] [address]      Does the patient have enough for 3 days?   [] Yes   [x] No - Send as HP to POD

## 2024-09-05 ENCOUNTER — TELEPHONE (OUTPATIENT)
Dept: RHEUMATOLOGY | Facility: CLINIC | Age: 29
End: 2024-09-05

## 2024-09-05 DIAGNOSIS — Z99.89 WALKER AS AMBULATION AID: ICD-10-CM

## 2024-09-05 DIAGNOSIS — E10.65 UNCONTROLLED TYPE 1 DIABETES MELLITUS WITH HYPERGLYCEMIA (HCC): Primary | ICD-10-CM

## 2024-09-05 DIAGNOSIS — Z30.41 ENCOUNTER FOR SURVEILLANCE OF CONTRACEPTIVE PILLS: ICD-10-CM

## 2024-09-05 DIAGNOSIS — M32.9 LUPUS (HCC): ICD-10-CM

## 2024-09-05 DIAGNOSIS — M25.551 PAIN OF RIGHT HIP: Primary | ICD-10-CM

## 2024-09-05 LAB — DSDNA AB SER-ACNC: 1 IU/ML (ref 0–9)

## 2024-09-05 RX ORDER — LEVONORGESTREL AND ETHINYL ESTRADIOL 0.15-0.03
1 KIT ORAL DAILY
Qty: 84 TABLET | Refills: 0 | Status: SHIPPED | OUTPATIENT
Start: 2024-09-05

## 2024-09-10 ENCOUNTER — LAB (OUTPATIENT)
Dept: LAB | Facility: IMAGING CENTER | Age: 29
End: 2024-09-10
Payer: COMMERCIAL

## 2024-09-10 DIAGNOSIS — E10.65 UNCONTROLLED TYPE 1 DIABETES MELLITUS WITH HYPERGLYCEMIA (HCC): ICD-10-CM

## 2024-09-10 LAB
BUN SERPL-MCNC: 18 MG/DL (ref 5–25)
CREAT SERPL-MCNC: 0.65 MG/DL (ref 0.6–1.3)
GFR SERPL CREATININE-BSD FRML MDRD: 122 ML/MIN/1.73SQ M

## 2024-09-10 PROCEDURE — 84520 ASSAY OF UREA NITROGEN: CPT

## 2024-09-10 PROCEDURE — 82565 ASSAY OF CREATININE: CPT

## 2024-09-10 PROCEDURE — 36415 COLL VENOUS BLD VENIPUNCTURE: CPT

## 2024-09-11 ENCOUNTER — HOSPITAL ENCOUNTER (OUTPATIENT)
Dept: RADIOLOGY | Facility: IMAGING CENTER | Age: 29
Discharge: HOME | End: 2024-09-11
Payer: COMMERCIAL

## 2024-09-11 DIAGNOSIS — M25.551 PAIN OF RIGHT HIP: ICD-10-CM

## 2024-09-11 DIAGNOSIS — M32.8 OTHER FORMS OF SYSTEMIC LUPUS ERYTHEMATOSUS, UNSPECIFIED ORGAN INVOLVEMENT STATUS (HCC): Primary | ICD-10-CM

## 2024-09-11 PROCEDURE — A9585 GADOBUTROL INJECTION: HCPCS | Performed by: STUDENT IN AN ORGANIZED HEALTH CARE EDUCATION/TRAINING PROGRAM

## 2024-09-11 PROCEDURE — 73720 MRI LWR EXTREMITY W/O&W/DYE: CPT

## 2024-09-11 RX ORDER — SODIUM CHLORIDE 9 MG/ML
20 INJECTION, SOLUTION INTRAVENOUS ONCE
Status: CANCELLED | OUTPATIENT
Start: 2024-09-13

## 2024-09-11 RX ORDER — GADOBUTROL 604.72 MG/ML
6 INJECTION INTRAVENOUS
Status: COMPLETED | OUTPATIENT
Start: 2024-09-11 | End: 2024-09-11

## 2024-09-11 RX ORDER — ACETAMINOPHEN 325 MG/1
650 TABLET ORAL ONCE
Status: CANCELLED | OUTPATIENT
Start: 2024-09-13

## 2024-09-11 RX ORDER — DIPHENHYDRAMINE HCL 25 MG
25 TABLET ORAL ONCE
Status: CANCELLED | OUTPATIENT
Start: 2024-09-13

## 2024-09-11 RX ADMIN — GADOBUTROL 6 ML: 604.72 INJECTION INTRAVENOUS at 09:00

## 2024-09-12 ENCOUNTER — OFFICE VISIT (OUTPATIENT)
Dept: ENDOCRINOLOGY | Facility: CLINIC | Age: 29
End: 2024-09-12
Payer: COMMERCIAL

## 2024-09-12 VITALS
WEIGHT: 136 LBS | HEIGHT: 61 IN | BODY MASS INDEX: 25.68 KG/M2 | HEART RATE: 94 BPM | OXYGEN SATURATION: 99 % | SYSTOLIC BLOOD PRESSURE: 108 MMHG | DIASTOLIC BLOOD PRESSURE: 78 MMHG

## 2024-09-12 DIAGNOSIS — E78.5 HYPERLIPIDEMIA, UNSPECIFIED HYPERLIPIDEMIA TYPE: ICD-10-CM

## 2024-09-12 DIAGNOSIS — E88.819 INSULIN RESISTANCE: ICD-10-CM

## 2024-09-12 DIAGNOSIS — E10.65 TYPE 1 DIABETES MELLITUS WITH HYPERGLYCEMIA (HCC): Primary | ICD-10-CM

## 2024-09-12 DIAGNOSIS — E03.9 HYPOTHYROIDISM, UNSPECIFIED TYPE: ICD-10-CM

## 2024-09-12 PROCEDURE — 99214 OFFICE O/P EST MOD 30 MIN: CPT

## 2024-09-12 PROCEDURE — 95251 CONT GLUC MNTR ANALYSIS I&R: CPT

## 2024-09-12 RX ORDER — METFORMIN HCL 500 MG
1000 TABLET, EXTENDED RELEASE 24 HR ORAL 2 TIMES DAILY WITH MEALS
Qty: 360 TABLET | Refills: 2 | Status: SHIPPED | OUTPATIENT
Start: 2024-09-12

## 2024-09-12 NOTE — PROGRESS NOTES
Established Patient Progress Note    CC: Follow-up for type 1 diabetes mellitus    Impression & Plan:    Problem List Items Addressed This Visit       Hyperlipidemia      above goal of 70  - referral placed for MNT         Hypothyroidism     Date thyroid lab work.  Orders given.  For now continue current regimen         Relevant Orders    TSH + Free T4    Type 1 diabetes mellitus with hyperglycemia (HCC) - Primary     Poorly controlled but improved from prior.  Patient would benefit from more aggressive algorithm such as Medtronic 7 80G.  I have given her information on this today. She is estimating carbs. I have advised her to use 30 g for normal meal, 45 for larger meal and 15 g for smaller meal given her postmeal hyperglycemia as she is likely not counting carbs correctly.  We have also decided to strengthen her carb ratio to 4 g.  She will monitor for hypoglycemia.  If this were to occur she will notify our office and increase back to 6 g.  She reports she was on 4 g in the past but recently has gained 10 pounds and this is likely contributing to her increased insulin requirements.  She has also been taking metformin.  We have also strengthened her sensitivity to 40.    She is interested in seeing the dietitian so I have given her a referral for medical nutrition therapy    Lab Results   Component Value Date    HGBA1C 7.8 (H) 09/04/2024            Relevant Medications    metFORMIN (GLUCOPHAGE-XR) 500 mg 24 hr tablet    Other Relevant Orders    Hemoglobin A1C    Ambulatory referral to Diabetic Education     Other Visit Diagnoses       Insulin resistance        Relevant Medications    metFORMIN (GLUCOPHAGE-XR) 500 mg 24 hr tablet            Orders Placed This Encounter   Procedures    TSH + Free T4     Standing Status:   Future     Standing Expiration Date:   9/12/2025    Hemoglobin A1C     Standing Status:   Future     Standing Expiration Date:   9/12/2025    Ambulatory referral to Diabetic Education      Standing Status:   Future     Standing Expiration Date:   9/12/2025     Referral Priority:   Routine     Referral Type:   Consult - AMB     Referral Reason:   Specialty Services Required     Requested Specialty:   Diabetes Services     Number of Visits Requested:   1     Expiration Date:   9/12/2025       History of Present Illness:     Jenny Rodrigues is a 29 y.o. female with a history of type 1 diabetes, hypothyroidism, hyperlipidemia. Complications: none. Last A1C was 7.8. Home glucose monitoring: CGM. Not currently using pump but prescribed T-Slim    She reports hyperglycemia around mealtimes despite bolusing prior but is unsure of exact carb counts at times.  She also reports weight gain, fatigue and hip pain.  She has also been dealing with nausea and therefore has not been eating as much. She is going to [place] tomorrow for 3 weeks.     Recent hospitalizations or illnesses: no    5/3/24 for DKA secondary to poor po intake due to sore throat and not taking novolog.    Currently prescribed tandem t:slim but has not been wearing therefore there is no download to review.  She also took off her CGM September 3 thus data is limited.    CGM Interpretation  Jenny Rodrigues   Device used Dexcom for Personal Use  Indication: Type of Diabetes: Type 1 Diabetes  More than 72 hours of data was reviewed. Report to be scanned to chart.   Date Range: Aug 21 to Sept 3, 2024  Analysis of data:   Average Glucose: 205 mg/dl  SD : 84 mg/dl  Time in Target Range: 44%  Time Above Range: high: 30%; very high: 26%  Time Below Range: 0%   Interpretation of data: hyperglycemia around meals.    Note: she does not want to upgrade to Dexcom G7- her mother has it and she does not like it.     Current regimen:   Novolog ICR: 6g and ISF 50  Tresiba 32 units daily     Hypothyroidism: taking 25 mcg daily. Complains of weight gain and fatigue. No recent lab work to review     Eye exam: UTD, 5/2024  Foot exam: UTD, 4/2024    Patient Active  Problem List   Diagnosis    Patellofemoral pain syndrome of right knee    Abnormal liver function test    Right sided abdominal pain    Chronic fatigue and malaise    Uncontrolled type 1 diabetes mellitus with hypoglycemia without coma (HCC)    Abnormal stools    Urinary frequency    Concussion without loss of consciousness    Nausea and vomiting    Agitation    Chronic post-traumatic stress disorder (PTSD)    Generalized anxiety disorder with panic attacks    Activity intolerance    OCD (obsessive compulsive disorder)    Chronic pain of right knee    Chronic abdominal pain    Blood in the stool    Visual changes    Hashimoto's thyroiditis    Dysuria    Paresthesia of left leg    Vitamin D deficiency    Neuropathy    Polyarthritis    Muscle weakness    Word finding difficulty    Insulin pump status    Secondary amenorrhea    Chronic back pain    Syncope    Vulvodynia    Yeast infection    Bipolar affective disorder (HCC)    Odynophagia    Weakness generalized    Low serum vitamin B12    Arthralgia    Positive SOHAM (antinuclear antibody)    Seizures (HCC)    History of anesthesia complications    Rheumatoid factor positive    Hyperlipidemia    Fatty stools    Left low back pain    Visual hallucinations    Elevated prolactin level    Type 1 diabetes mellitus (HCC)    Nail abnormality    Verruca    Hypothyroidism    Physical deconditioning    Right-sided back pain    Other chest pain    Gastroesophageal reflux disease    Constipation    Bruising    Type 1 diabetes mellitus with hyperglycemia (HCC)    Controlled diabetes mellitus type 1 with complications (HCC)    Delayed emergence from anesthesia    Hearing loss of right ear    Undifferentiated connective tissue disease (HCC)    Annual physical exam    Pigmentation abnormality of skin    Idiopathic hypotension    Other forms of systemic lupus erythematosus (HCC)    Pre-conception counseling    Uncontrolled type 1 diabetes mellitus with hyperglycemia (HCC)    Lupus (HCC)     Left hemiparesis (HCC)    Vestibular migraine    Cognitive attention deficit    Lhermitte's sign positive    Paresthesia    Tremor    Oropharyngeal dysphagia    Pain of right hip      Past Medical History:   Diagnosis Date    Abdominal pain     Anaphylaxis     Anxiety     Anxiety and depression     COVID-19 12/2020    Delayed emergence from anesthesia 03/23/2023    Severe episode of emergence delirium (confused, combative) after MAC anesthetic on 03/2023 for EGD. Received versed 2mg, >50mcg precedex, 5mg IV haldol, and 50mcg fentanyl. Waxing and waning episodes of agitation. Any future anesthetics should NOT be done at [facility].    Delirium, induced by drug     anesthesia    Depression     Diabetes mellitus (HCC)     Disease of thyroid gland     Hashimoto    DKA, type 1 (HCC) 05/11/2021    Eating disorder     history of anorexia/bulemia 7516-3423    Food intolerance     Fracture of fibula     R Salter I    Gilbert disease     Hashimoto's disease 02/21/2020    Head injury     Headache(784.0)     Nasal congestion     PTSD (post-traumatic stress disorder)     Rectal bleed     Seizures (HCC)     Type 1 diabetes (HCC)       Past Surgical History:   Procedure Laterality Date    COLONOSCOPY      EGD  03/23/2023    KNEE SURGERY Right 07/06/2020    NASAL SEPTOPLASTY W/ TURBINOPLASTY      SINUS SURGERY      SKIN BIOPSY      TURBINOPLASTY N/A 03/22/2021    Procedure: TURBINOPLASTY;  Surgeon: Jn Hidalgo MD;  Location: [facility];  Service: ENT    UPPER GASTROINTESTINAL ENDOSCOPY      WISDOM TOOTH EXTRACTION      WRIST SURGERY      left; Excision of ganglion      Family History   Problem Relation Age of Onset    Hypertension Mother     Migraines Mother         Headache    Diabetes type II Mother     Varicose Veins Mother     Hyperlipidemia Mother     Diabetes Mother     Arthritis Mother     Depression Mother     Hearing loss Mother     Anxiety disorder Mother     Eczema Father     Cholelithiasis Father     Hypertension  "Father     Sarcoidosis Father         Liver    Hyperlipidemia Father     Diabetes Father     Coronary artery disease Father     Nephrolithiasis Father     Cirrhosis Father     Alcohol abuse Father     Thyroid disease Sister     Hashimoto's thyroiditis Sister     Alcohol abuse Brother     Cancer Family     Diabetes Family     Hypertension Family      Social History     Tobacco Use    Smoking status: Never     Passive exposure: Never    Smokeless tobacco: Never    Tobacco comments:     Tobacco smoke exposure (Father smokes cigars)   Substance Use Topics    Alcohol use: Yes     Comment: rarely     Allergies   Allergen Reactions    Shellfish-Derived Products - Food Allergy Anaphylaxis    Insulin Glargine Other (See Comments)     LANTUS BRAND -Burning and redness under skin          Current Outpatient Medications:     acetaminophen (TYLENOL) 325 mg tablet, Take 3 tablets (975 mg total) by mouth every 8 (eight) hours as needed for mild pain or headaches, Disp: , Rfl:     BD Insulin Syringe U/F 31G X 5/16\" 0.5 ML MISC, Use as directly with weekly methotrexate injection., Disp: 100 each, Rfl: 0    Belimumab (BENLYSTA IV), Inject into a catheter in a vein every month to absorb continually Switching to monthly on 12/5, Disp: , Rfl:     clindamycin (CLEOCIN T) 1 % lotion, Apply 1 application. topically 2 (two) times a day, Disp: , Rfl:     Cyanocobalamin 1000 MCG SUBL, Place 1 tablet (1,000 mcg total) under the tongue daily, Disp: 90 tablet, Rfl: 0    famotidine (PEPCID) 40 MG tablet, Take 1 tablet (40 mg total) by mouth daily at bedtime, Disp: 90 tablet, Rfl: 3    folic acid (FOLVITE) 1 mg tablet, TAKE 1 TABLET BY MOUTH EVERY DAY, Disp: 90 tablet, Rfl: 3    gabapentin (Neurontin) 600 MG tablet, Take 1 tablet (600 mg total) by mouth daily, Disp: 90 tablet, Rfl: 1    Glucagon (Gvoke HypoPen 2-Pack) 1 MG/0.2ML SOAJ, 1 mg PRN for severe hypoglycemia, Disp: 0.4 mL, Rfl: 0    Glucagon HCl (Glucagon Emergency) 1 MG/ML SOLR, , Disp: " , Rfl:     levonorgestrel-ethinyl estradiol (Altavera) 0.15-30 MG-MCG per tablet, TAKE 1 TABLET BY MOUTH EVERY DAY, Disp: 84 tablet, Rfl: 0    levothyroxine 25 mcg tablet, Take 1 tablet (25 mcg total) by mouth daily, Disp: 60 tablet, Rfl: 0    metFORMIN (GLUCOPHAGE-XR) 500 mg 24 hr tablet, Take 2 tablets (1,000 mg total) by mouth 2 (two) times a day with meals, Disp: 360 tablet, Rfl: 2    methotrexate 50 MG/2ML injection, Inject 0.8 mL (20 mg total) under the skin once a week, Disp: 8 mL, Rfl: 4    NovoLOG 100 UNIT/ML injection, Up to 100 units per day with insulin pump, Disp: 90 mL, Rfl: 3    Tresiba FlexTouch 100 units/mL injection pen, Inject 32 Units under the skin daily, Disp: 15 mL, Rfl: 2    EPINEPHrine (EPIPEN) 0.3 mg/0.3 mL SOAJ, Inject 0.3 mL (0.3 mg total) into a muscle once for 1 dose, Disp: 0.6 mL, Rfl: 0    hydroxychloroquine (PLAQUENIL) 200 mg tablet, Take 1 tablet (200 mg total) by mouth 2 (two) times a day with meals (Patient taking differently: Take 200 mg by mouth 2 (two) times a day with meals 400mg Monday-Friday, 200mg on Saturday and Sunday), Disp: 180 tablet, Rfl: 3    Insulin Pen Needle (BD PEN NEEDLE NASIM U/F) 32G X 4 MM MISC, by Does not apply route 4 (four) times a day for 180 days, Disp: 400 each, Rfl: 3    Review of Systems   Constitutional:  Negative for chills and fever.   HENT:  Negative for ear pain and sore throat.    Eyes:  Negative for pain and visual disturbance.   Respiratory:  Negative for cough and shortness of breath.    Cardiovascular:  Negative for chest pain and palpitations.   Gastrointestinal:  Negative for abdominal pain and vomiting.   Genitourinary:  Negative for dysuria and hematuria.   Musculoskeletal:  Negative for arthralgias and back pain.   Skin:  Negative for color change and rash.   Neurological:  Negative for seizures and syncope.   All other systems reviewed and are negative.      Physical Exam:  Body mass index is 25.7 kg/m².  /78   Pulse 94   Ht 5'  "1\" (1.549 m)   Wt 61.7 kg (136 lb)   SpO2 99%   BMI 25.70 kg/m²    Wt Readings from Last 3 Encounters:   09/12/24 61.7 kg (136 lb)   09/04/24 62.6 kg (138 lb)   08/29/24 62.6 kg (138 lb)       Physical Exam  Vitals reviewed.   Constitutional:       Appearance: Normal appearance.   HENT:      Head: Normocephalic and atraumatic.   Cardiovascular:      Rate and Rhythm: Normal rate.   Pulmonary:      Effort: Pulmonary effort is normal.   Neurological:      Mental Status: She is alert and oriented to person, place, and time.   Psychiatric:         Mood and Affect: Mood normal.         Behavior: Behavior normal.           Labs:   Lab Results   Component Value Date    HGBA1C 7.8 (H) 09/04/2024    HGBA1C 10.5 (A) 06/07/2024    HGBA1C 9.6 (H) 01/14/2024     Lab Results   Component Value Date    CREATININE 0.65 09/10/2024    CREATININE 0.57 (L) 05/05/2024    CREATININE 0.54 (L) 05/04/2024    BUN 18 09/10/2024    K 3.7 05/05/2024     05/05/2024    CO2 21 05/05/2024     GFR, Calculated   Date Value Ref Range Status   12/29/2020 69 >60 mL/min/1.73m2 Final     Comment:     mL/min per 1.73 square meters                                            Normal Function or Mild Renal    Disease (if clinically at risk):  >or=60  Moderately Decreased:                30-59  Severely Decreased:                  15-29  Renal Failure:                         <15                                            -American GFR: multiply reported GFR by 1.16    Please note that the eGFR is based on the CKD-EPI calculation, and is not intended to be used for drug dosing.     eGFRcr   Date Value Ref Range Status   07/09/2023 125 >59 Final     eGFR   Date Value Ref Range Status   09/10/2024 122 ml/min/1.73sq m Final     Lab Results   Component Value Date    HDL 73 09/04/2024    TRIG 95 09/04/2024     Lab Results   Component Value Date    ALT 20 05/03/2024    AST 26 05/03/2024    GGT 10 06/26/2019    ALKPHOS 91 05/03/2024     Lab Results "   Component Value Date    UGI4ZQDAHOIH 4.241 05/03/2024    MQE0SWATXLMG 2.490 01/14/2024    EYG2OEMBQHSE 0.926 10/20/2023     Lab Results   Component Value Date    FREET4 1.10 07/16/2021         There are no Patient Instructions on file for this visit.      Discussed with the patient and all questioned fully answered. She will call me if any problems arise.

## 2024-09-12 NOTE — ASSESSMENT & PLAN NOTE
Poorly controlled but improved from prior.  Patient would benefit from more aggressive algorithm such as Medtronic 7 80G.  I have given her information on this today. She is estimating carbs. I have advised her to use 30 g for normal meal, 45 for larger meal and 15 g for smaller meal given her postmeal hyperglycemia as she is likely not counting carbs correctly.  We have also decided to strengthen her carb ratio to 4 g.  She will monitor for hypoglycemia.  If this were to occur she will notify our office and increase back to 6 g.  She reports she was on 4 g in the past but recently has gained 10 pounds and this is likely contributing to her increased insulin requirements.  She has also been taking metformin.  We have also strengthened her sensitivity to 40.    She is interested in seeing the dietitian so I have given her a referral for medical nutrition therapy    Lab Results   Component Value Date    HGBA1C 7.8 (H) 09/04/2024

## 2024-09-13 ENCOUNTER — OFFICE VISIT (OUTPATIENT)
Dept: OBGYN CLINIC | Facility: MEDICAL CENTER | Age: 29
End: 2024-09-13
Payer: COMMERCIAL

## 2024-09-13 ENCOUNTER — HOSPITAL ENCOUNTER (OUTPATIENT)
Dept: INFUSION CENTER | Facility: CLINIC | Age: 29
End: 2024-09-13
Payer: COMMERCIAL

## 2024-09-13 VITALS
WEIGHT: 136 LBS | BODY MASS INDEX: 25.7 KG/M2 | HEART RATE: 91 BPM | TEMPERATURE: 97.8 F | OXYGEN SATURATION: 99 % | SYSTOLIC BLOOD PRESSURE: 111 MMHG | DIASTOLIC BLOOD PRESSURE: 80 MMHG | RESPIRATION RATE: 16 BRPM

## 2024-09-13 VITALS
BODY MASS INDEX: 25.68 KG/M2 | DIASTOLIC BLOOD PRESSURE: 88 MMHG | HEIGHT: 61 IN | WEIGHT: 136 LBS | HEART RATE: 83 BPM | SYSTOLIC BLOOD PRESSURE: 120 MMHG

## 2024-09-13 DIAGNOSIS — Z99.89 WALKER AS AMBULATION AID: ICD-10-CM

## 2024-09-13 DIAGNOSIS — M25.551 PAIN OF RIGHT HIP: Primary | ICD-10-CM

## 2024-09-13 DIAGNOSIS — M32.8 OTHER FORMS OF SYSTEMIC LUPUS ERYTHEMATOSUS, UNSPECIFIED ORGAN INVOLVEMENT STATUS (HCC): Primary | ICD-10-CM

## 2024-09-13 DIAGNOSIS — M32.9 LUPUS (HCC): ICD-10-CM

## 2024-09-13 PROCEDURE — 96413 CHEMO IV INFUSION 1 HR: CPT

## 2024-09-13 PROCEDURE — 99204 OFFICE O/P NEW MOD 45 MIN: CPT | Performed by: ORTHOPAEDIC SURGERY

## 2024-09-13 RX ORDER — SODIUM CHLORIDE 9 MG/ML
20 INJECTION, SOLUTION INTRAVENOUS ONCE
OUTPATIENT
Start: 2024-10-11

## 2024-09-13 RX ORDER — ACETAMINOPHEN 325 MG/1
650 TABLET ORAL ONCE
Status: COMPLETED | OUTPATIENT
Start: 2024-09-13 | End: 2024-09-13

## 2024-09-13 RX ORDER — DIPHENHYDRAMINE HCL 25 MG
25 TABLET ORAL ONCE
OUTPATIENT
Start: 2024-10-11

## 2024-09-13 RX ORDER — DIPHENHYDRAMINE HCL 25 MG
25 TABLET ORAL ONCE
Status: COMPLETED | OUTPATIENT
Start: 2024-09-13 | End: 2024-09-13

## 2024-09-13 RX ORDER — SODIUM CHLORIDE 9 MG/ML
20 INJECTION, SOLUTION INTRAVENOUS ONCE
Status: COMPLETED | OUTPATIENT
Start: 2024-09-13 | End: 2024-09-13

## 2024-09-13 RX ORDER — ACETAMINOPHEN 325 MG/1
650 TABLET ORAL ONCE
OUTPATIENT
Start: 2024-10-11

## 2024-09-13 RX ADMIN — BELIMUMAB 600 MG: 120 INJECTION, POWDER, LYOPHILIZED, FOR SOLUTION INTRAVENOUS at 16:13

## 2024-09-13 RX ADMIN — DIPHENHYDRAMINE HYDROCHLORIDE 25 MG: 25 TABLET ORAL at 15:18

## 2024-09-13 RX ADMIN — ACETAMINOPHEN 650 MG: 325 TABLET ORAL at 15:18

## 2024-09-13 RX ADMIN — SODIUM CHLORIDE 20 ML/HR: 0.9 INJECTION, SOLUTION INTRAVENOUS at 15:18

## 2024-09-13 NOTE — LETTER
September 13, 2024     Patient: Jenny Rodrigues  YOB: 1995  Date of Visit: 9/13/2024      To Whom it May Concern:    Jenny Rodrigues is under my professional care. Jenny was seen in my office on 9/13/2024. .    If you have any questions or concerns, please don't hesitate to call.         Sincerely,          Dell Treviño MD        CC: No Recipients

## 2024-09-13 NOTE — LETTER
September 13, 2024     Patient: Jenny Rodrigues  YOB: 1995  Date of Visit: 9/13/2024      To Whom it May Concern:    Jenny Rodrigues is under my professional care. Jenny was seen in my office on 9/13/2024. Jenny will need assistance of a wheelchair to navigate as she is not able to WB on her right leg.    If you have any questions or concerns, please don't hesitate to call.         Sincerely,          Dell Treviño MD        CC: No Recipients

## 2024-09-13 NOTE — LETTER
[facility]  [address]  [address]  [place] [address]  Dept: [phone]    September 13, 2024     Patient: Jenny Rodrigues   YOB: 1995   Date of Visit: 9/13/2024       To Whom it May Concern:    Jenny Rodrigues is under my professional care. She was seen in the hospital from 9/13/2024 to 09/13/24. She may return to work on 09/14/24 without limitations.    If you have any questions or concerns, please don't hesitate to call.         Sincerely,          Nain Spain RN

## 2024-09-13 NOTE — PROGRESS NOTES
Pt here for benlysta, offers no complaints, denies recent infection or antibiotic use, resting in chair

## 2024-09-13 NOTE — PROGRESS NOTES
"Orthopaedic Surgery - Office Note  Jenny Rodrigues (29 y.o. female)   : 1995   MRN: [id number]  Encounter Date: 2024    Assessment / Plan  Severe right hip pain, concern for joint infection      Her exam was very limited due to her severe pain, I strongly encouraged her to go to the ED as I am concerned for an infection in her hip joint. She understands the seriousness of this. She is leaving today for [place] for 3 weeks, I understand this but recommended she been seen for infection work up   MRI of right hip was ordered   We did review her recent lab work, a new order for CRP was ordered today   Ice, heat and anti-inflammatories prn  Reviewed MRI of femur, CT and XR during the visit   Reviewed notes from Dr. Yu  Follow-up:  Return for follow up after MRI with Dr. Treviño.      Chief Complaint / Date of Onset  Right hip pain, began in August with no inciting event   Injury Mechanism / Date  None  Surgery / Date  None    History of Present Illness   Jenny Rodrigues is a 29 y.o. female who presents for consultation of right hip pain at the request of Dr. Fuller. She has been having pain since August with no inciting event then had an increase in about 2 weeks ago with no new injury. She describes her pain as being in her groin, she uses a C-sign to describe her pain. Her pain worsens with movement of the hip, prolonged WB activity and sitting. She states by the end of the day she can not handle the pain. She was using a walker to help with pain, this did help but she is being discouraged at home to use it by her parents because she is \"too young\".  Patient states when the Aleve wears off, she spikes a fever, gets cold and nausea.    Pain does have Lupus and has type 1 diabetic     Treatment Summary  Medications / Modalities  Aleve and Tylenol prn with good relief   Bracing / Immobilization  None  Physical Therapy  None  Injections  None  Prior Surgeries  None  Other " "Treatments  None    Employment / Current Status  Works from home    Sport / Organization / Current Status  N.A      Review of Systems  Pertinent items are noted in HPI.  All other systems were reviewed and are negative.      Physical Exam  /88   Pulse 83   Ht 5' 1\" (1.549 m)   Wt 61.7 kg (136 lb)   BMI 25.70 kg/m²   Cons: Appears well.  No apparent distress.  Psych: Alert. Oriented x3.  Mood and affect normal.  Eyes: PERRLA, EOMI  Resp: Normal effort.  No audible wheezing or stridor.  CV: Palpable pulse.  No discernable arrhythmia.  No LE edema.  Lymph:  No palpable cervical, axillary, or inguinal lymphadenopathy.  Skin: Warm.  No palpable masses.  No visible lesions.  Neuro: Normal muscle tone.  Normal and symmetric DTR's.     Right Hip Exam- exam was very limited by pain  Alignment / Posture:  Normal resting hip posture.  Inspection:  No swelling. No ecchymosis.  Palpation:   severe groin tenderness. No effusion.  ROM:   unable to test due to pain   Strength:  Hip Flexors 2/5. Hip Abductors unable/5.  Stability:  No objective hip instability.  Tests:   unable to test  Neurovascular:  Sensation intact in DP/SP/Carrillo/Sa/T nerve distributions. 2+ DP & PT pulses.  Gait:  Normal.       Studies Reviewed  I have personally reviewed pertinent films in PACS.  XR of right hip- images from 08/29/2024 no abnormalities or fractures   CT of right hip- 09/04/2024 no concern for AVN, cyst seen in femoral head which is not concerning   MRI of right femur- 09/11/2024 this study was not useful for patients symptoms      Procedures  No procedures today.    Medical, Surgical, Family, and Social History  The patient's medical history, family history, and social history, were reviewed and updated as appropriate.    Past Medical History:   Diagnosis Date    Abdominal pain     Anaphylaxis     Anxiety     Anxiety and depression     COVID-19 12/2020    Delayed emergence from anesthesia 03/23/2023    Severe episode of emergence " delirium (confused, combative) after MAC anesthetic on 03/2023 for EGD. Received versed 2mg, >50mcg precedex, 5mg IV haldol, and 50mcg fentanyl. Waxing and waning episodes of agitation. Any future anesthetics should NOT be done at [facility].    Delirium, induced by drug     anesthesia    Depression     Diabetes mellitus (HCC)     Disease of thyroid gland     Hashimoto    DKA, type 1 (HCC) 05/11/2021    Eating disorder     history of anorexia/bulemia [id number]    Food intolerance     Fracture of fibula     R Salter I    Gilbert disease     Hashimoto's disease 02/21/2020    Head injury     Headache(784.0)     Nasal congestion     PTSD (post-traumatic stress disorder)     Rectal bleed     Seizures (HCC)     Type 1 diabetes (HCC)        Past Surgical History:   Procedure Laterality Date    COLONOSCOPY      EGD  03/23/2023    KNEE SURGERY Right 07/06/2020    NASAL SEPTOPLASTY W/ TURBINOPLASTY      SINUS SURGERY      SKIN BIOPSY      TURBINOPLASTY N/A 03/22/2021    Procedure: TURBINOPLASTY;  Surgeon: Jn Hidalgo MD;  Location: [facility];  Service: ENT    UPPER GASTROINTESTINAL ENDOSCOPY      WISDOM TOOTH EXTRACTION      WRIST SURGERY      left; Excision of ganglion       Family History   Problem Relation Age of Onset    Hypertension Mother     Migraines Mother         Headache    Diabetes type II Mother     Varicose Veins Mother     Hyperlipidemia Mother     Diabetes Mother     Arthritis Mother     Depression Mother     Hearing loss Mother     Anxiety disorder Mother     Eczema Father     Cholelithiasis Father     Hypertension Father     Sarcoidosis Father         Liver    Hyperlipidemia Father     Diabetes Father     Coronary artery disease Father     Nephrolithiasis Father     Cirrhosis Father     Alcohol abuse Father     Thyroid disease Sister     Hashimoto's thyroiditis Sister     Alcohol abuse Brother     Cancer Family     Diabetes Family     Hypertension Family        Social History     Occupational History     "Occupation: unemployed   Tobacco Use    Smoking status: Never     Passive exposure: Never    Smokeless tobacco: Never    Tobacco comments:     Tobacco smoke exposure (Father smokes cigars)   Vaping Use    Vaping status: Never Used   Substance and Sexual Activity    Alcohol use: Yes     Comment: rarely    Drug use: Never    Sexual activity: Yes     Partners: Male     Birth control/protection: Condom Male, OCP       Allergies   Allergen Reactions    Shellfish-Derived Products - Food Allergy Anaphylaxis    Insulin Glargine Other (See Comments)     LANTUS BRAND -Burning and redness under skin          Current Outpatient Medications:     acetaminophen (TYLENOL) 325 mg tablet, Take 3 tablets (975 mg total) by mouth every 8 (eight) hours as needed for mild pain or headaches, Disp: , Rfl:     BD Insulin Syringe U/F 31G X 5/16\" 0.5 ML MISC, Use as directly with weekly methotrexate injection., Disp: 100 each, Rfl: 0    Belimumab (BENLYSTA IV), Inject into a catheter in a vein every month to absorb continually Switching to monthly on 12/5, Disp: , Rfl:     clindamycin (CLEOCIN T) 1 % lotion, Apply 1 application. topically 2 (two) times a day, Disp: , Rfl:     Cyanocobalamin 1000 MCG SUBL, Place 1 tablet (1,000 mcg total) under the tongue daily, Disp: 90 tablet, Rfl: 0    famotidine (PEPCID) 40 MG tablet, Take 1 tablet (40 mg total) by mouth daily at bedtime, Disp: 90 tablet, Rfl: 3    folic acid (FOLVITE) 1 mg tablet, TAKE 1 TABLET BY MOUTH EVERY DAY, Disp: 90 tablet, Rfl: 3    gabapentin (Neurontin) 600 MG tablet, Take 1 tablet (600 mg total) by mouth daily, Disp: 90 tablet, Rfl: 1    Glucagon (Gvoke HypoPen 2-Pack) 1 MG/0.2ML SOAJ, 1 mg PRN for severe hypoglycemia, Disp: 0.4 mL, Rfl: 0    Glucagon HCl (Glucagon Emergency) 1 MG/ML SOLR, , Disp: , Rfl:     levonorgestrel-ethinyl estradiol (Altavera) 0.15-30 MG-MCG per tablet, TAKE 1 TABLET BY MOUTH EVERY DAY, Disp: 84 tablet, Rfl: 0    levothyroxine 25 mcg tablet, Take 1 " tablet (25 mcg total) by mouth daily, Disp: 60 tablet, Rfl: 0    metFORMIN (GLUCOPHAGE-XR) 500 mg 24 hr tablet, Take 2 tablets (1,000 mg total) by mouth 2 (two) times a day with meals, Disp: 360 tablet, Rfl: 2    methotrexate 50 MG/2ML injection, Inject 0.8 mL (20 mg total) under the skin once a week, Disp: 8 mL, Rfl: 4    NovoLOG 100 UNIT/ML injection, Up to 100 units per day with insulin pump, Disp: 90 mL, Rfl: 3    Tresiba FlexTouch 100 units/mL injection pen, Inject 32 Units under the skin daily, Disp: 15 mL, Rfl: 2    EPINEPHrine (EPIPEN) 0.3 mg/0.3 mL SOAJ, Inject 0.3 mL (0.3 mg total) into a muscle once for 1 dose, Disp: 0.6 mL, Rfl: 0    hydroxychloroquine (PLAQUENIL) 200 mg tablet, Take 1 tablet (200 mg total) by mouth 2 (two) times a day with meals (Patient taking differently: Take 200 mg by mouth 2 (two) times a day with meals 400mg Monday-Friday, 200mg on Saturday and Sunday), Disp: 180 tablet, Rfl: 3    Insulin Pen Needle (BD PEN NEEDLE NASIM U/F) 32G X 4 MM MISC, by Does not apply route 4 (four) times a day for 180 days, Disp: 400 each, Rfl: 3      Maida Blackwood    Scribe Attestation      I,:   am acting as a scribe while in the presence of the attending physician.:       I,:   personally performed the services described in this documentation    as scribed in my presence.:

## 2024-09-17 LAB
APOB+LDLR+PCSK9 GENE MUT ANL BLD/T: NOT DETECTED
BRCA1+BRCA2 DEL+DUP + FULL MUT ANL BLD/T: NOT DETECTED
MLH1+MSH2+MSH6+PMS2 GN DEL+DUP+FUL M: NOT DETECTED

## 2024-09-25 ENCOUNTER — TELEPHONE (OUTPATIENT)
Dept: NEUROLOGY | Facility: CLINIC | Age: 29
End: 2024-09-25

## 2024-09-25 NOTE — TELEPHONE ENCOUNTER
Left message to see if we could move patients appt to another day in the week of 10/9 or 10/10 in the HFU appt times, patient is currently double booked on 10/7. Teams MA if any issues!

## 2024-09-27 ENCOUNTER — TELEPHONE (OUTPATIENT)
Age: 29
End: 2024-09-27

## 2024-09-27 NOTE — TELEPHONE ENCOUNTER
Pts sister calling in to help reschedule pts appt. [place] Will [place] next [place].     Access center does not have ability to override appts like a HFU for an OVS/OVL.    Please contact pt next week to help assist her in rescheduling her appt or pts sister Ambika. Thank you.

## 2024-09-30 NOTE — TELEPHONE ENCOUNTER
Left message for her sister Ambika [phone] to r/s her sisters appt with LEROY. Please send me a teams message if unable to r/s!

## 2024-09-30 NOTE — TELEPHONE ENCOUNTER
09/30/24    Patient sister Miss. Apple called office on behalf of patient and requested if 10/10/24 at 10:00 AM Appt slot was still available.     Checked for Patient sister and appt was still available.    HFU Appt rescheduled for 10/10/24, 10:00 AM With Miss. Phillips the [place] Location.       Any questions, please contact Patient or Sister.  Thank You.

## 2024-10-02 ENCOUNTER — APPOINTMENT (OUTPATIENT)
Dept: LAB | Facility: CLINIC | Age: 29
End: 2024-10-02
Payer: COMMERCIAL

## 2024-10-02 DIAGNOSIS — I51.9 MYXEDEMA HEART DISEASE: ICD-10-CM

## 2024-10-02 DIAGNOSIS — E03.9 MYXEDEMA HEART DISEASE: ICD-10-CM

## 2024-10-02 DIAGNOSIS — M25.551 PAIN OF RIGHT HIP: ICD-10-CM

## 2024-10-02 LAB
CRP SERPL QL: 1.1 MG/L
T4 FREE SERPL-MCNC: 0.65 NG/DL (ref 0.61–1.12)
TSH SERPL DL<=0.05 MIU/L-ACNC: 0.98 UIU/ML (ref 0.45–4.5)

## 2024-10-02 PROCEDURE — 36415 COLL VENOUS BLD VENIPUNCTURE: CPT

## 2024-10-02 PROCEDURE — 86140 C-REACTIVE PROTEIN: CPT

## 2024-10-02 PROCEDURE — 84439 ASSAY OF FREE THYROXINE: CPT

## 2024-10-02 PROCEDURE — 84443 ASSAY THYROID STIM HORMONE: CPT

## 2024-10-03 ENCOUNTER — TELEPHONE (OUTPATIENT)
Dept: ENDOCRINOLOGY | Facility: CLINIC | Age: 29
End: 2024-10-03

## 2024-10-03 ENCOUNTER — HOSPITAL ENCOUNTER (OUTPATIENT)
Dept: RADIOLOGY | Age: 29
Discharge: HOME | End: 2024-10-03
Payer: COMMERCIAL

## 2024-10-03 DIAGNOSIS — M25.551 PAIN OF RIGHT HIP: ICD-10-CM

## 2024-10-03 PROCEDURE — 73721 MRI JNT OF LWR EXTRE W/O DYE: CPT

## 2024-10-03 NOTE — TELEPHONE ENCOUNTER
Left message for patient   ----- Message from MARGARET Cali sent at 10/3/2024  8:35 AM EDT -----  Thyroid function is normal continue current regimen

## 2024-10-06 PROBLEM — Z79.631 METHOTREXATE, LONG TERM, CURRENT USE: Status: ACTIVE | Noted: 2024-10-06

## 2024-10-06 PROBLEM — Z79.899 LONG-TERM USE OF PLAQUENIL: Status: ACTIVE | Noted: 2024-10-06

## 2024-10-06 PROBLEM — M79.7 FIBROMYALGIA: Status: ACTIVE | Noted: 2024-10-06

## 2024-10-06 PROBLEM — Z79.60 LONG-TERM USE OF IMMUNOSUPPRESSANT MEDICATION: Status: ACTIVE | Noted: 2024-10-06

## 2024-10-07 ENCOUNTER — OFFICE VISIT (OUTPATIENT)
Dept: RHEUMATOLOGY | Facility: CLINIC | Age: 29
End: 2024-10-07
Payer: COMMERCIAL

## 2024-10-07 VITALS
WEIGHT: 140.6 LBS | OXYGEN SATURATION: 100 % | BODY MASS INDEX: 26.55 KG/M2 | DIASTOLIC BLOOD PRESSURE: 72 MMHG | HEIGHT: 61 IN | SYSTOLIC BLOOD PRESSURE: 114 MMHG | HEART RATE: 83 BPM

## 2024-10-07 DIAGNOSIS — E10.9 TYPE 1 DIABETES MELLITUS WITHOUT COMPLICATION (HCC): ICD-10-CM

## 2024-10-07 DIAGNOSIS — M79.7 FIBROMYALGIA: ICD-10-CM

## 2024-10-07 DIAGNOSIS — M32.9 SYSTEMIC LUPUS ERYTHEMATOSUS, UNSPECIFIED SLE TYPE, UNSPECIFIED ORGAN INVOLVEMENT STATUS (HCC): Primary | ICD-10-CM

## 2024-10-07 DIAGNOSIS — E06.3 HASHIMOTO'S DISEASE: ICD-10-CM

## 2024-10-07 DIAGNOSIS — Z79.60 LONG-TERM USE OF IMMUNOSUPPRESSANT MEDICATION: ICD-10-CM

## 2024-10-07 DIAGNOSIS — Z79.899 LONG-TERM USE OF PLAQUENIL: ICD-10-CM

## 2024-10-07 DIAGNOSIS — Z79.631 METHOTREXATE, LONG TERM, CURRENT USE: ICD-10-CM

## 2024-10-07 DIAGNOSIS — Z11.1 SCREENING-PULMONARY TB: ICD-10-CM

## 2024-10-07 PROCEDURE — 99215 OFFICE O/P EST HI 40 MIN: CPT | Performed by: INTERNAL MEDICINE

## 2024-10-07 NOTE — ASSESSMENT & PLAN NOTE
Ms. Rodrigues is a 29-year-old female with history significant for type 1 insulin-dependent diabetes mellitus diagnosed in 2019 and Hashimoto's thyroiditis diagnosed in 2020 who presents for a follow-up of an undifferentiated connective tissue disorder (diagnosed based on a positive SOHAM 1:80 nuclear, speckled, homogeneous patterns, arthralgias, malar rash/photosensitivity)/systemic lupus.  She is currently on hydroxychloroquine 200 mg twice daily on the weekdays and once daily on the weekend that was started in July 2021, injectable methotrexate 20 mg once weekly and Benlysta infusions 10 mg/kg every 4 weeks that was added in September 2023.     # Undifferentiated connective tissue disease/systemic lupus  - Jenny presents today for a follow-up of an undifferentiated connective tissue disease/systemic lupus for which she is currently on hydroxychloroquine 200 mg twice daily on the weekdays and 200 mg once daily on the weekend, subcutaneous methotrexate 20 mg once weekly and Benlysta infusions on a monthly basis.  She reports with consistent use of the Benlysta this has significantly helped her overall pain level and she is currently denying any concerning joint pains, swelling or stiffness.  The addition of gabapentin that she is taking at 600 mg nightly could also be helping and this has also benefited the neuropathy symptoms in her lower legs so it will be continued.  She overall appears stable from a connective tissue disease perspective and the same medications will be continued unchanged.  She is due to update her monitoring labs to assess for drug toxicity prior to the follow up and will follow-up with ophthalmology for bi-annual eye exams.     - Of note she is aware that methotrexate is teratogenic and I recommend discontinuing this 3 months prior to planning conception.  We also discussed there is insufficient data on the safety of belimumab during pregnancy and around the time of pregnancy planning we will  need to have a discussion regarding the risks and benefits.  In the meanwhile she will continue with dual contraception including birth control pills and barrier contraception.      Orders:    CBC and differential; Future    Comprehensive metabolic panel; Future    C-reactive protein; Future    Sedimentation rate, automated; Future    C4 complement; Future    C3 complement; Future    Urinalysis with microscopic; Future    Protein / creatinine ratio, urine; Future    Anti-DNA antibody, double-stranded; Future

## 2024-10-07 NOTE — PROGRESS NOTES
Ambulatory Visit  Name: Jenny Rodrigues      : 1995      MRN: [id number]  Encounter Provider: Chelle Tanner MD  Encounter Date: 10/7/2024   Encounter department: [facility] [place]    Assessment & Plan  Systemic lupus erythematosus, unspecified SLE type, unspecified organ involvement status (HCC)  Ms. Rodrigues is a 29-year-old female with history significant for type 1 insulin-dependent diabetes mellitus diagnosed in  and Hashimoto's thyroiditis diagnosed in  who presents for a follow-up of an undifferentiated connective tissue disorder (diagnosed based on a positive SOHAM 1:80 nuclear, speckled, homogeneous patterns, arthralgias, malar rash/photosensitivity)/systemic lupus.  She is currently on hydroxychloroquine 200 mg twice daily on the weekdays and once daily on the weekend that was started in 2021, injectable methotrexate 20 mg once weekly and Benlysta infusions 10 mg/kg every 4 weeks that was added in 2023.     # Undifferentiated connective tissue disease/systemic lupus  - Jenny presents today for a follow-up of an undifferentiated connective tissue disease/systemic lupus for which she is currently on hydroxychloroquine 200 mg twice daily on the weekdays and 200 mg once daily on the weekend, subcutaneous methotrexate 20 mg once weekly and Benlysta infusions on a monthly basis.  She reports with consistent use of the Benlysta this has significantly helped her overall pain level and she is currently denying any concerning joint pains, swelling or stiffness.  The addition of gabapentin that she is taking at 600 mg nightly could also be helping and this has also benefited the neuropathy symptoms in her lower legs so it will be continued.  She overall appears stable from a connective tissue disease perspective and the same medications will be continued unchanged.  She is due to update her monitoring labs to assess for drug toxicity prior to the follow up and  will follow-up with ophthalmology for bi-annual eye exams.     - Of note she is aware that methotrexate is teratogenic and I recommend discontinuing this 3 months prior to planning conception.  We also discussed there is insufficient data on the safety of belimumab during pregnancy and around the time of pregnancy planning we will need to have a discussion regarding the risks and benefits.  In the meanwhile she will continue with dual contraception including birth control pills and barrier contraception.      Orders:    CBC and differential; Future    Comprehensive metabolic panel; Future    C-reactive protein; Future    Sedimentation rate, automated; Future    C4 complement; Future    C3 complement; Future    Urinalysis with microscopic; Future    Protein / creatinine ratio, urine; Future    Anti-DNA antibody, double-stranded; Future    Methotrexate, long term, current use         Long-term use of Plaquenil         Long-term use of immunosuppressant medication         Hashimoto's disease         Type 1 diabetes mellitus without complication (HCC)    Lab Results   Component Value Date    HGBA1C 7.8 (H) 09/04/2024            Fibromyalgia         Screening-pulmonary TB    Orders:    Quantiferon TB Gold Plus Assay; Future      Patient's rheumatologic disease(s) threaten long-term function if not appropriately treated.      History of Present Illness     INITIAL VISIT NOTE (6/2021):  Ms. Rodrigues is a 26-year-old female with history significant for type 1 insulin-dependent diabetes mellitus diagnosed in 2019 and Hashimoto's thyroiditis (elevated thyroid microsomal and thyroglobulin antibodies) diagnosed in 2020, who presents for further evaluation of a positive SOHAM 1:80 nuclear, speckled, homogeneous patterns and rheumatoid factor of 10, in addition to widespread joint pains.  She is referred by MARGARET Sparks for a rheumatology consult.       Patient reports she started to feel unwell approximately 2 years ago  and further evaluation for this led to the diagnosis of type 1 diabetes as well as the Hashimoto's thyroiditis.  She has been managing the diabetes with insulin and states for the hypothyroidism as a result of Hashimoto's thyroiditis she was only started on levothyroxine 25 mcg once daily approximately 1 month ago.  There has been a conflict between the endocrinologists she has seen in regards to starting the levothyroxine, but due to progressive complaints which the patient and her primary care physician felt was related to hypothyroidism she was finally started on thyroid supplementation 1 month ago.  She has not noticed a change in her symptoms so far and has not had a TSH rechecked yet.  The TSH was slightly elevated at 5.24 on 5/1 prior to starting the levothyroxine.  She is also scheduled to see a private endocrinologist for another opinion.     She reports over the past 1 and half years she has also started to experience joint and muscle pain which has been gradually progressive.  She experiences a degree of pain on a daily basis but states that her symptoms can spontaneously flare-up as well as with changes in the weather, especially when it is cold/damp/humid.  She does feel better with the warmer weather and has also started utilizing a therapy pool which does help with her symptoms.  She has noticed joint pains to affect her hands occasionally but prominently in her wrists.  She will notice pain in her shoulders and hips mostly with manipulation and range of motion.  She describes chronic pain that will also affect her knees as well as in her ankles and feet.  At times she will notice a muscle pain throughout her upper and lower extremities as well.  She has noticed muscle cramps to affect her hands and feet.  No significant joint pains in her elbows or low back.  She has not noticed any obvious swelling of her wrists but feels like they may be swollen as she can gauge this by her bracelet.  She has also  noticed swelling to affect her ankles.  She does experience morning stiffness which affects her diffusely and takes about 30-40 minutes to improve.  She tries to avoid Tylenol as this affects her blood glucose monitoring.  She has tried ibuprofen for her symptoms which has not helped significantly.     Other than the body pain she also describes chronic fatigue, unintentional weight gain and hair thinning.  She denies fevers, unintentional weight loss, focal alopecia, dry eyes, dry mouth, inflammatory eye disease, skin rash, psoriasis, photosensitivity, mouth/nose ulcers, swollen glands, pleuritic chest pain, shortness of breath, inflammatory bowel disease, blood clots (reports a history of 1 very early miscarriage), Raynaud's or a family history of autoimmune disease.     Testing done for her symptoms showed the positive SOHAM and borderline positive rheumatoid factor.  A rheumatoid factor was negative in 2019.  An ESR, CRP, anti CCP antibody, CK, Lyme antibody profile, anti neutrophilic cytoplasmic antibodies, Sjogren's antibodies and anti double-stranded DNA antibody were normal.  An MRI of her right knee and ultrasound of her right Achilles tendon in 2018 were normal.     In view of her complaints she was also evaluated by Neurology to rule out multiple sclerosis and currently there is no specific diagnosis from their perspective.  She did have an MRI of her brain done last year which showed a single focus of white matter change which has been stable since 2014.        7/13/2021:  Patient presents for a follow-up of a positive SOHAM.  I reviewed her workup done following the last office visit which showed an unremarkable SOHAM specificity, C3, C4, antiphospholipid antibody testing, urinalysis, urine protein creatinine ratio, vitamin-D, CK, anti CCP antibody and HLA B27 antigen.  X-rays of her hands and feet were unremarkable.       She reports there has been no change in her symptoms since the last office visit and  she continues to describe the chronic fatigue, muscle aches/spasms as well as ongoing joint pains.  She is scheduled for another endocrinology opinion tomorrow to see if any of her symptoms may be related to the underlying hypothyroidism, but this is not felt to be the case so far.        2/16/2022:  Patient presents for follow-up of an undifferentiated connective tissue disease.  She is currently on hydroxychloroquine 200 mg twice daily that was started in July 2021.       She reports in about September she started to notice a significant improvement in her well-being with being on the hydroxychloroquine.  There was an improvement in her fatigue and joint pains.  She had overall been doing well up until January but after she received the COVID-19 booster vaccination noticed a flare-up in her symptoms with more fatigue as well as significant joint pains which mostly affected her hands, wrists, knees and ankles.  She has been noticing intermittent swelling of her fingers as well as ankles/feet.  She has been taking Tylenol alternating with ibuprofen but is unsure if the medications are specifically helping her or if the side effects as a result of the vaccination are gradually improving on their own.  She was also evaluated by Gastroenterology and had an upper endoscopy done which showed chronic gastritis so she was advised to minimize NSAID use.  No recent steroids.     Except for the pain she has also had a few occurrences of redness on her face in a butterfly pattern as well as affecting her forehead.  She has noticed photosensitivity and states while she was at the beach in August she developed a skin rash in sun-exposed areas.  She has been more careful with applying sunscreen as well as covering up in the sun.  She denies fevers, weight loss, alopecia, mouth/nose ulcers, swollen glands or pleuritic chest pain.        6/22/2022:  Patient presents for follow-up of an undifferentiated connective tissue disease.   She is currently on hydroxychloroquine 200 mg twice daily that was started in July 2021.  I reviewed her blood work from February which showed a normal CBC, CMP, ESR, CRP, C3, C4, double-stranded DNA antibody, urinalysis and urine protein creatinine ratio.     She reports overall since the last office visit she has been fairly stable except for an episode of pericarditis following the COVID-19 booster vaccination in January.  She was seen by Cardiology and prescribed colchicine to take for 3 months.  She reports the symptoms have mostly resolved and only on occasion will she notice some chest pain which is not long-lasting.  She reports with sun exposure she will start to notice more joint pains and fatigue.  For the most part she deals with some degree of joint pain on a daily basis and still notices swelling affecting her hands and ankles.  She will be starting occupational therapy as due to the joint pain in her hands she has started to feel weakness in her hand strength.  No fevers, unintentional weight loss, skin rashes or mouth/nose ulcers.     Although she still endorses symptoms she reports overall she has been feeling much better since starting the hydroxychloroquine.        10/27/2022:  Patient presents for a follow-up of an undifferentiated connective disease.  She is currently on hydroxychloroquine 200 mg twice daily that was started in July 2021 and oral methotrexate 15 mg once weekly that was started in June 2022.  I reviewed her recent labs which showed an unremarkable CBC, CMP, ESR, CRP, C3, C4, double-stranded DNA antibody and urine protein creatinine ratio.       She reports she has been doing well without any joint pains and the methotrexate really seems to have helped her.  With initially starting it she did notice numbness in her hand and legs for which she was seen in the emergency department but as it was unclear if this was related to the methotrexate I requested she resume it and the  symptoms have not been consistent.  No skin rashes or mouth/nose ulcers.     She does report chronic symptoms of abdominal pain, nausea, vomiting and greasy stools for which she has been following with Gastroenterology.  A CT abdomen was unremarkable.  She is scheduled for a HIDA scan.        3/2/2023:  Patient presents for a follow-up of an undifferentiated connective disease.  She is currently on hydroxychloroquine 200 mg twice daily that was started in July 2021 and oral methotrexate 20 mg once weekly that was started in June 2022.  I reviewed her recent labs which showed an unremarkable CBC, CMP, ESR, CRP, C3, C4, double-stranded DNA antibody and urine protein creatinine ratio.       She has been doing well since the last office visit and denies any complaints today.  No symptoms such as true fevers, unintentional weight loss, alopecia, skin rashes, mouth/nose ulcers, swollen glands, pleuritic chest pain or joint pain/swelling/stiffness.     She has been tolerating her medications well and is up-to-date with her annual eye exams but reports since December she has been dealing with recurrent infections.  She took a while to recover from COVID and recently was diagnosed with back to back sinus infections requiring antibiotic use.        9/11/2023:  Patient presents for a follow-up of an undifferentiated connective disease.  She is currently on hydroxychloroquine 200 mg twice daily on the weekdays and once daily on the weekend that was started in July 2021 and oral methotrexate 20 mg once weekly that was started in June 2022.  I reviewed her recent labs which showed an unremarkable CBC, CMP, ESR, CRP, C3, double-stranded DNA antibody and urine protein creatinine ratio.  A C4 complement was slightly decreased at 16.     Unfortunately since the last visit she has been feeling unwell with increased fatigue and widespread body pain that has been flaring up.  This is leading to a significant impact on her mental health.   She reports especially over the past month she has been in a constant flare.  Prior to this she did have an infection for which she was on antibiotics and held the injectable methotrexate for 1 week.  Initially she was trying to manage through the symptoms but did contact me on August 31 at which time I prescribed her prednisone 20 mg once daily for 7 days which did help.  The steroids do raise her blood sugars up to the 400s but she has an insulin pump which will manage the elevated blood sugars.  After completing the steroids she is still not feeling well and is very upset and tearful at today's visit.  Apart from the joint pain she has also been noticing swelling that can primarily affect her hands, wrists, knees and ankles.  The steroids did help with the joint swelling.  She has noticed occurrence of a skin rash on her face as well.     She has been tolerating the hydroxychloroquine and methotrexate well overall but has been dealing with recurrent infections which was present even prior to the initiation of methotrexate.  She is following with immunology to determine if she may have an underlying immunodeficiency.  She is up-to-date with her annual eye exams.        12/18/2023:  Patient presents for a follow-up of an undifferentiated connective disease.  She is currently on hydroxychloroquine 200 mg twice daily on the weekdays and once daily on the weekend that was started in July 2021, injectable methotrexate 20 mg once weekly and Benlysta infusions 10 mg/kg every 4 weeks that was added after the last visit.  I reviewed her recent labs which showed a slightly decreased C4 complement of 13.  A CBC, CMP, ESR, CRP, C3, double-stranded DNA antibody, urine analysis and urine protein creatinine ratio were unremarkable.     She has completed the Benlysta infusions and received her first maintenance infusion recently.  She notes that it has helped with the facial rash but so far no significant impact on the fatigue  and joint pains which she is still symptomatic with.  She avoids Tylenol and NSAIDs as this causes stomach upset.  She is currently on gabapentin 300 mg nightly per neurology for lower extremity neuropathy but states the medication does not help with the neuropathic complaints or with joint pains.     She has been tolerating the hydroxychloroquine, methotrexate and Benlysta well without any concerning side effects.  She is up-to-date with her annual eye exams.        6/12/2024:  Patient presents for a follow-up of an undifferentiated connective disease.  She is currently on hydroxychloroquine 200 mg twice daily on the weekdays and once daily on the weekend that was started in July 2021, injectable methotrexate 20 mg once weekly and Benlysta infusions 10 mg/kg every 4 weeks.  She is due to update her full panel of lupus related labs.     She reports with consistent use of the Benlysta she has noticed a significant improvement in her pain levels and she is currently denying any concerning joint pains, swelling or stiffness.  No unexplained fevers, unintentional weight loss, focal alopecia, mouth/nose ulcers, swollen glands or pleuritic chest pain that she describes.  Recently she has noticed an itchy, raised skin rash to occur on her bilateral arms and abdomen which has been occurring on a mostly nightly basis.  She is wondering if this could be related to an allergic exposure.  She has been using Benadryl as needed which helps.  She has previously seen an allergist and had testing done which was unremarkable.     She mentions since her childhood she has had issues with recurrent upper respiratory tract infections.  This pattern seems to be continuing as she had a prolonged sinus infection from February till the end of March and was diagnosed with recurrent upper respiratory tract infections twice in May.  She has been able to manage this with outpatient antibiotics but states anytime she has an infection her blood  sugars significantly elevate and she has been hospitalized for diabetic ketoacidosis.     She has been tolerating the hydroxychloroquine, methotrexate and Benlysta well without any concerning side effects, other than possible infection risk.  She is up-to-date with her annual eye exams.      10/7/2024:  Patient presents for a follow-up of an undifferentiated connective disease/systemic lupus.  She is currently on hydroxychloroquine 200 mg twice daily on the weekdays and once daily on the weekend that was started in July 2021, injectable methotrexate 20 mg once weekly and Benlysta infusions 10 mg/kg every 4 weeks.  I reviewed her recent labs which shows an unremarkable CBC, CMP, ESR, CRP [on repeat check it normalized], C3, C4, double-stranded DNA antibody, urine analysis and urine protein creatinine ratio.    She has overall been stable, but since the last office visit for the past few months has been dealing with right hip/groin region pain.  She did have an MRI done which ruled out avascular necrosis and showed possible impingement.  She needs to schedule a follow-up with orthopedics.  She has been taking Tylenol and ibuprofen to help manage the pain.  Otherwise no consistent joint pains, swelling or stiffness.  With exposure to the sun she may develop a skin rash and has recently noticed dryness on her upper arms which was also sun exposed.  She does also have a mild degree of keratosis pilaris.  No fevers, unintentional weight loss, focal alopecia, mouth/nose ulcers, swollen glands or pleuritic chest pain.    She has been tolerating the hydroxychloroquine, methotrexate and Benlysta well without any concerning side effects.  She is up-to-date with her bi-annual eye exams.  She is seeing a retinal specialist at [facility] for a retinal tuft, but this is not felt to be secondary to the hydroxychloroquine.  No diabetic retinopathy.         Review of Systems  Constitutional: Negative for weight change, fevers, chills,  night sweats, fatigue.  ENT/Mouth: Negative for hearing changes, ear pain, nasal congestion, sinus pain, hoarseness, sore throat, rhinorrhea, swallowing difficulty.   Eyes: Negative for pain, redness, discharge, vision changes.   Cardiovascular: Negative for chest pain, SOB, palpitations.   Respiratory: Negative for cough, sputum, wheezing, dyspnea.   Gastrointestinal: Negative for nausea, vomiting, diarrhea, constipation, pain, heartburn.  Genitourinary: Negative for dysuria, urinary frequency, hematuria.   Musculoskeletal: As per HPI.  Skin: Negative for skin rash, color changes.   Neuro: Negative for weakness, numbness, tingling, loss of consciousness.   Psych: Negative for anxiety, depression.   Heme/Lymph: Negative for easy bruising, bleeding, lymphadenopathy.      Past Medical History   Past Medical History:   Diagnosis Date    Abdominal pain     Anaphylaxis     Anxiety     Anxiety and depression     COVID-19 12/2020    Delayed emergence from anesthesia 03/23/2023    Severe episode of emergence delirium (confused, combative) after MAC anesthetic on 03/2023 for EGD. Received versed 2mg, >50mcg precedex, 5mg IV haldol, and 50mcg fentanyl. Waxing and waning episodes of agitation. Any future anesthetics should NOT be done at [facility].    Delirium, induced by drug     anesthesia    Depression     Diabetes mellitus (HCC)     Disease of thyroid gland     Hashimoto    DKA, type 1 (HCC) 05/11/2021    Eating disorder     history of anorexia/bulemia 3554-7182    Food intolerance     Fracture of fibula     R Salter I    Gilbert disease     Hashimoto's disease 02/21/2020    Head injury     Headache(784.0)     Nasal congestion     PTSD (post-traumatic stress disorder)     Rectal bleed     Seizures (HCC)     Type 1 diabetes (HCC)      Past Surgical History:   Procedure Laterality Date    COLONOSCOPY      EGD  03/23/2023    KNEE SURGERY Right 07/06/2020    NASAL SEPTOPLASTY W/ TURBINOPLASTY      SINUS SURGERY      SKIN BIOPSY    "   TURBINOPLASTY N/A 03/22/2021    Procedure: TURBINOPLASTY;  Surgeon: Jn Hidalgo MD;  Location: [facility];  Service: ENT    UPPER GASTROINTESTINAL ENDOSCOPY      WISDOM TOOTH EXTRACTION      WRIST SURGERY      left; Excision of ganglion     Family History   Problem Relation Age of Onset    Hypertension Mother     Migraines Mother         Headache    Diabetes type II Mother     Varicose Veins Mother     Hyperlipidemia Mother     Diabetes Mother     Arthritis Mother     Depression Mother     Hearing loss Mother     Anxiety disorder Mother     Eczema Father     Cholelithiasis Father     Hypertension Father     Sarcoidosis Father         Liver    Hyperlipidemia Father     Diabetes Father     Coronary artery disease Father     Nephrolithiasis Father     Cirrhosis Father     Alcohol abuse Father     Thyroid disease Sister     Hashimoto's thyroiditis Sister     Alcohol abuse Brother     Cancer Family     Diabetes Family     Hypertension Family      Current Outpatient Medications on File Prior to Visit   Medication Sig Dispense Refill    acetaminophen (TYLENOL) 325 mg tablet Take 3 tablets (975 mg total) by mouth every 8 (eight) hours as needed for mild pain or headaches      BD Insulin Syringe U/F 31G X 5/16\" 0.5 ML MISC Use as directly with weekly methotrexate injection. 100 each 0    Belimumab (BENLYSTA IV) Inject into a catheter in a vein every month to absorb continually Switching to monthly on 12/5      clindamycin (CLEOCIN T) 1 % lotion Apply 1 application. topically 2 (two) times a day      Cyanocobalamin 1000 MCG SUBL Place 1 tablet (1,000 mcg total) under the tongue daily 90 tablet 0    famotidine (PEPCID) 40 MG tablet Take 1 tablet (40 mg total) by mouth daily at bedtime 90 tablet 3    folic acid (FOLVITE) 1 mg tablet TAKE 1 TABLET BY MOUTH EVERY DAY 90 tablet 3    gabapentin (Neurontin) 600 MG tablet Take 1 tablet (600 mg total) by mouth daily 90 tablet 1    Glucagon (Gvoke HypoPen 2-Pack) 1 MG/0.2ML " "SOAJ 1 mg PRN for severe hypoglycemia 0.4 mL 0    Glucagon HCl (Glucagon Emergency) 1 MG/ML SOLR       levonorgestrel-ethinyl estradiol (Altavera) 0.15-30 MG-MCG per tablet TAKE 1 TABLET BY MOUTH EVERY DAY 84 tablet 0    levothyroxine 25 mcg tablet Take 1 tablet (25 mcg total) by mouth daily 60 tablet 0    metFORMIN (GLUCOPHAGE-XR) 500 mg 24 hr tablet Take 2 tablets (1,000 mg total) by mouth 2 (two) times a day with meals 360 tablet 2    methotrexate 50 MG/2ML injection Inject 0.8 mL (20 mg total) under the skin once a week 8 mL 4    NovoLOG 100 UNIT/ML injection Up to 100 units per day with insulin pump 90 mL 3    Tresiba FlexTouch 100 units/mL injection pen Inject 32 Units under the skin daily 15 mL 2    EPINEPHrine (EPIPEN) 0.3 mg/0.3 mL SOAJ Inject 0.3 mL (0.3 mg total) into a muscle once for 1 dose 0.6 mL 0    hydroxychloroquine (PLAQUENIL) 200 mg tablet Take 1 tablet (200 mg total) by mouth 2 (two) times a day with meals (Patient taking differently: Take 200 mg by mouth 2 (two) times a day with meals 400mg Monday-Friday, 200mg on Saturday and Sunday) 180 tablet 3    Insulin Pen Needle (BD PEN NEEDLE NASIM U/F) 32G X 4 MM MISC by Does not apply route 4 (four) times a day for 180 days 400 each 3     No current facility-administered medications on file prior to visit.     Allergies   Allergen Reactions    Shellfish-Derived Products - Food Allergy Anaphylaxis    Insulin Glargine Other (See Comments)     LANTUS BRAND -Burning and redness under skin       Current Outpatient Medications on File Prior to Visit   Medication Sig Dispense Refill    acetaminophen (TYLENOL) 325 mg tablet Take 3 tablets (975 mg total) by mouth every 8 (eight) hours as needed for mild pain or headaches      BD Insulin Syringe U/F 31G X 5/16\" 0.5 ML MISC Use as directly with weekly methotrexate injection. 100 each 0    Belimumab (BENLYSTA IV) Inject into a catheter in a vein every month to absorb continually Switching to monthly on 12/5 " clindamycin (CLEOCIN T) 1 % lotion Apply 1 application. topically 2 (two) times a day      Cyanocobalamin 1000 MCG SUBL Place 1 tablet (1,000 mcg total) under the tongue daily 90 tablet 0    famotidine (PEPCID) 40 MG tablet Take 1 tablet (40 mg total) by mouth daily at bedtime 90 tablet 3    folic acid (FOLVITE) 1 mg tablet TAKE 1 TABLET BY MOUTH EVERY DAY 90 tablet 3    gabapentin (Neurontin) 600 MG tablet Take 1 tablet (600 mg total) by mouth daily 90 tablet 1    Glucagon (Gvoke HypoPen 2-Pack) 1 MG/0.2ML SOAJ 1 mg PRN for severe hypoglycemia 0.4 mL 0    Glucagon HCl (Glucagon Emergency) 1 MG/ML SOLR       levonorgestrel-ethinyl estradiol (Altavera) 0.15-30 MG-MCG per tablet TAKE 1 TABLET BY MOUTH EVERY DAY 84 tablet 0    levothyroxine 25 mcg tablet Take 1 tablet (25 mcg total) by mouth daily 60 tablet 0    metFORMIN (GLUCOPHAGE-XR) 500 mg 24 hr tablet Take 2 tablets (1,000 mg total) by mouth 2 (two) times a day with meals 360 tablet 2    methotrexate 50 MG/2ML injection Inject 0.8 mL (20 mg total) under the skin once a week 8 mL 4    NovoLOG 100 UNIT/ML injection Up to 100 units per day with insulin pump 90 mL 3    Tresiba FlexTouch 100 units/mL injection pen Inject 32 Units under the skin daily 15 mL 2    EPINEPHrine (EPIPEN) 0.3 mg/0.3 mL SOAJ Inject 0.3 mL (0.3 mg total) into a muscle once for 1 dose 0.6 mL 0    hydroxychloroquine (PLAQUENIL) 200 mg tablet Take 1 tablet (200 mg total) by mouth 2 (two) times a day with meals (Patient taking differently: Take 200 mg by mouth 2 (two) times a day with meals 400mg Monday-Friday, 200mg on Saturday and Sunday) 180 tablet 3    Insulin Pen Needle (BD PEN NEEDLE NASIM U/F) 32G X 4 MM MISC by Does not apply route 4 (four) times a day for 180 days 400 each 3     No current facility-administered medications on file prior to visit.      Social History     Tobacco Use    Smoking status: Never     Passive exposure: Never    Smokeless tobacco: Never    Tobacco comments:      "Tobacco smoke exposure (Father smokes cigars)   Vaping Use    Vaping status: Never Used   Substance and Sexual Activity    Alcohol use: Yes     Comment: rarely    Drug use: Never    Sexual activity: Yes     Partners: Male     Birth control/protection: Condom Male, OCP         Objective     /72 (BP Location: Right arm, Patient Position: Sitting, Cuff Size: Adult)   Pulse 83   Ht 5' 1\" (1.549 m)   Wt 63.8 kg (140 lb 9.6 oz)   SpO2 100%   BMI 26.57 kg/m²     Physical Exam  General: Well appearing, well nourished, in no distress. Oriented x 3, normal mood and affect.  Ambulating without difficulty.  Skin: Good turgor, no rash, unusual bruising or prominent lesions.  Dryness of her upper arms noted and KP on arms and thighs.  Hair: Normal texture and distribution.  Nails: Normal color, no deformities.  HEENT:  Head: Normocephalic, atraumatic.  Eyes: Conjunctiva clear, sclera non-icteric, EOM intact.  Nose: No external lesions.  Neck: Supple.  Extremities: No amputations or deformities, cyanosis, edema.  Musculoskeletal:   Hands, wrists, elbows and shoulders - unremarkable.   Neurologic: Alert and oriented. No focal neurological deficits appreciated.   Psychiatric: Normal mood and affect.       "

## 2024-10-10 ENCOUNTER — PATIENT MESSAGE (OUTPATIENT)
Dept: NEUROLOGY | Facility: CLINIC | Age: 29
End: 2024-10-10

## 2024-10-10 ENCOUNTER — OFFICE VISIT (OUTPATIENT)
Dept: NEUROLOGY | Facility: CLINIC | Age: 29
End: 2024-10-10
Payer: COMMERCIAL

## 2024-10-10 VITALS
SYSTOLIC BLOOD PRESSURE: 113 MMHG | BODY MASS INDEX: 26.24 KG/M2 | DIASTOLIC BLOOD PRESSURE: 82 MMHG | HEIGHT: 61 IN | WEIGHT: 139 LBS | HEART RATE: 78 BPM

## 2024-10-10 DIAGNOSIS — G43.709 CHRONIC MIGRAINE WITHOUT AURA WITHOUT STATUS MIGRAINOSUS, NOT INTRACTABLE: Primary | ICD-10-CM

## 2024-10-10 PROCEDURE — 99215 OFFICE O/P EST HI 40 MIN: CPT | Performed by: PHYSICIAN ASSISTANT

## 2024-10-10 RX ORDER — GALCANEZUMAB 120 MG/ML
INJECTION, SOLUTION SUBCUTANEOUS
Qty: 2 ML | Refills: 0 | Status: SHIPPED | OUTPATIENT
Start: 2024-10-10

## 2024-10-10 RX ORDER — GALCANEZUMAB 120 MG/ML
INJECTION, SOLUTION SUBCUTANEOUS
Qty: 1 ML | Refills: 11 | Status: SHIPPED | OUTPATIENT
Start: 2024-10-10

## 2024-10-10 RX ORDER — RIZATRIPTAN BENZOATE 10 MG/1
TABLET, ORALLY DISINTEGRATING ORAL
Qty: 9 TABLET | Refills: 2 | Status: SHIPPED | OUTPATIENT
Start: 2024-10-10

## 2024-10-10 RX ORDER — KETOROLAC TROMETHAMINE 10 MG/1
10 TABLET, FILM COATED ORAL EVERY 6 HOURS PRN
Qty: 10 TABLET | Refills: 0 | Status: SHIPPED | OUTPATIENT
Start: 2024-10-10

## 2024-10-10 NOTE — ASSESSMENT & PLAN NOTE
Orders:    Galcanezumab-gnlm (Emgality) 120 MG/ML SOAJ; One ml subcutaneous on the right thigh and 1 ml subcutaneous on the left thigh at the same time for 1 dose    Galcanezumab-gnlm (Emgality) 120 MG/ML SOAJ; 30 days after loading dose, inject 1 pen subq every 30 days.    ketorolac (TORADOL) 10 mg tablet; Take 1 tablet (10 mg total) by mouth every 6 (six) hours as needed (migraine) Max 2-3 per week.    rizatriptan (MAXALT-MLT) 10 mg disintegrating tablet; 1 tab at migraine onset, repeat after 2 hours if needed; Max 2 per day and 3 per week.

## 2024-10-10 NOTE — LETTER
October 10, 2024     Patient: Jenny Rodrigues  YOB: 1995  Date of Visit: 10/10/2024      To Whom it May Concern:    Jenny Rodrigues is under my professional care. Jenny was seen in my office on 10/10/2024.    If you have any questions or concerns, please don't hesitate to call.         Sincerely,          Paola Phillips PA-C        CC: No Recipients

## 2024-10-10 NOTE — PROGRESS NOTES
Ambulatory Visit  Name: Jenny Rodrigues      : 1995      MRN: [id number]  Encounter Provider: Paola Phillips PA-C  Encounter Date: 10/10/2024   Encounter department: NEUROLOGY [facility]    Assessment & Plan  Chronic migraine without aura without status migrainosus, not intractable    Orders:    Galcanezumab-gnlm (Emgality) 120 MG/ML SOAJ; One ml subcutaneous on the right thigh and 1 ml subcutaneous on the left thigh at the same time for 1 dose    Galcanezumab-gnlm (Emgality) 120 MG/ML SOAJ; 30 days after loading dose, inject 1 pen subq every 30 days.    ketorolac (TORADOL) 10 mg tablet; Take 1 tablet (10 mg total) by mouth every 6 (six) hours as needed (migraine) Max 2-3 per week.    rizatriptan (MAXALT-MLT) 10 mg disintegrating tablet; 1 tab at migraine onset, repeat after 2 hours if needed; Max 2 per day and 3 per week.      Ms. Jenny Rodrigues is here for migraine management.  She is a good candidate for Botox due to having greater than 15 migraine days per month however would like to lean more towards the CGRP injectables for prevention of migraines.  We discussed trying Emgality and I reviewed the side effects with her.  I would like her to take Toradol at the onset of a migraine however this tends to interact with methotrexate, so if she is denied to this by the pharmacy she can take Maxalt.  If Toradol is available to her I am okay with her using it as long as used extremely sparingly such as no more than 1 tab /week at the very most.  Side effects of all medications above reviewed.  Alternative would include trying amitriptyline again which she tried in the past for gynecologic symptoms.  She does not remember if she had side effects to this.    We discussed trying over-the-counter supplementation for prevention of migraines, keeping a journal of any triggers or patterns of possible.    The patient should not hesitate to call me prior to her follow up with any questions or  concerns.      There are no Patient Instructions on file for this visit.   History of Present Illness   HPI    Ms. Jenny Rodrigues is a very pleasant 29-year-old right-handed female who is here for neurological follow-up.  I have never seen this patient, she recently saw Dr. Espitia on 8/13/2024 and was referred to me for headache management.  The patient follows with rheumatology for SLE, undifferentiated connective tissue disease/systemic lupus on Plaquenil, methotrexate injection, Benlysta infusion.  She also has type 1 insulin-dependent diabetes melitis Dx 2019, Hashimoto's 2020.  Currently dealing with right hip pain and seeing orthopedics, so less active lately due to the pain.      She reports that bending her head forward gives her an electrical shock feeling down the neck.  She reports weakness in the arms and legs.  She feels an electrical shock down the left arm at times.  She was not diagnosed with MS however is following with Dr. Espitia and considering CSF studies to rule out CSF vasculitis, etc.    Migraines/headaches:  Onset:She has had headaches since childhood/teenage years and they have recently gotten worse  Head injury: None    Current pain: 2-3/10  Reaches pain level at worst: 8/10  Frequency: She has a low-grade headache almost all the time, and it increases to the severity of a migraine a few times per week  Duration: Several hours to days  Location: Right frontotemporal or bitemporal/bifrontal  Quality: Throbbing, pulsing, stabbing    Associated with: Some word finding difficulty, feels like she gets stuck and cannot think of the right word, trouble with focus or concentration, right greater than left eye twitching, nausea, sore/stiff neck, photophobia and phonophobia    She does not typically miss work days or social/family activities due to a headache.    Triggers: Unsure    Aura/ warning: blinks lights, spots, tiny long oval, during a headaches    Medications  "tried:  Prevention-  She has had adverse side effects to antidepressants in the past including SSRIs, Remeron.  She has tried amitriptyline in the past and would like to try this again.    Abortive-  Tylenol    Other non-medication therapies or treatments-none    Neck pain and description: During a migraine some stiffness or neck pain, and also electric type pain when she bends her neck forward as noted above.  Sleep concerns: None  Family planning: Not family-planning, currently on combination birth control levonorgestrel/ethinyl estradiol as noted on the med list.    Family hx of migraines: mom  Family hx of cerebral aneurysms: none    No excessive caffeine use, no illicit drug use, no excessive alcohol use.    Brain MRI 10/5/2023 is unremarkable for acute changes and suggests \"Unchanged minimal white matter signal changes compared to the prior examinations. There is no new dominant white matter lesion or pathologic area of intra-axial enhancement to suggest acute demyelination.\"    Cervical spine MRI with and without contrast 5/3/2024 is unremarkable.      Review of Systems  Constitutional:  Negative for appetite change, fatigue and fever.   HENT: Negative.  Negative for hearing loss, tinnitus, trouble swallowing and voice change.    Eyes:  Positive for visual disturbance. Negative for pain.   Respiratory: Negative.  Negative for shortness of breath.    Cardiovascular: Negative.  Negative for palpitations.   Gastrointestinal:  Positive for nausea. Negative for vomiting.   Endocrine: Negative.  Negative for cold intolerance.   Genitourinary: Negative.  Negative for dysuria, frequency and urgency.   Musculoskeletal:  Positive for neck pain and neck stiffness. Negative for back pain, gait problem and myalgias.   Skin: Negative.  Negative for rash.   Allergic/Immunologic: Negative.    Neurological:  Positive for dizziness, light-headedness and headaches (couple times a week). Negative for tremors, seizures, syncope, " facial asymmetry, speech difficulty, weakness and numbness.   Hematological: Negative.  Does not bruise/bleed easily.   Psychiatric/Behavioral:  Positive for sleep disturbance. Negative for confusion and hallucinations.      I have personally reviewed the MA's review of systems and made changes as necessary.    Pertinent Medical History         Medical History Reviewed by provider this encounter:  Tobacco  Allergies  Meds  Problems  Med Hx  Surg Hx  Fam Hx       Past Medical History   Past Medical History:   Diagnosis Date    Abdominal pain     Anaphylaxis     Anxiety     Anxiety and depression     COVID-19 12/2020    Delayed emergence from anesthesia 03/23/2023    Severe episode of emergence delirium (confused, combative) after MAC anesthetic on 03/2023 for EGD. Received versed 2mg, >50mcg precedex, 5mg IV haldol, and 50mcg fentanyl. Waxing and waning episodes of agitation. Any future anesthetics should NOT be done at [facility] and the [facility].    Delirium, induced by drug     anesthesia    Depression     Diabetes mellitus (HCC)     Disease of thyroid gland     Hashimoto    DKA, type 1 (HCC) 05/11/2021    Eating disorder     history of anorexia/bulemia 8614-1064    Food intolerance     Fracture of fibula     R Salter I    Gilbert disease     Hashimoto's disease 02/21/2020    Head injury     Headache(784.0)     Nasal congestion     PTSD (post-traumatic stress disorder)     Rectal bleed     Seizures (HCC)     Type 1 diabetes (HCC)      Past Surgical History:   Procedure Laterality Date    COLONOSCOPY      EGD  03/23/2023    KNEE SURGERY Right 07/06/2020    NASAL SEPTOPLASTY W/ TURBINOPLASTY      SINUS SURGERY      SKIN BIOPSY      TURBINOPLASTY N/A 03/22/2021    Procedure: TURBINOPLASTY;  Surgeon: Jn Hidalgo MD;  Location: [facility];  Service: ENT    UPPER GASTROINTESTINAL ENDOSCOPY      WISDOM TOOTH EXTRACTION      WRIST SURGERY      left; Excision of ganglion     Family History   Problem Relation Age of Onset     "Hypertension Mother     Migraines Mother         Headache    Diabetes type II Mother     Varicose Veins Mother     Hyperlipidemia Mother     Diabetes Mother     Arthritis Mother     Depression Mother     Hearing loss Mother     Anxiety disorder Mother     Eczema Father     Cholelithiasis Father     Hypertension Father     Sarcoidosis Father         Liver    Hyperlipidemia Father     Diabetes Father     Coronary artery disease Father     Nephrolithiasis Father     Cirrhosis Father     Alcohol abuse Father     Thyroid disease Sister     Hashimoto's thyroiditis Sister     Alcohol abuse Brother     Cancer Family     Diabetes Family     Hypertension Family      Current Outpatient Medications on File Prior to Visit   Medication Sig Dispense Refill    acetaminophen (TYLENOL) 325 mg tablet Take 3 tablets (975 mg total) by mouth every 8 (eight) hours as needed for mild pain or headaches      BD Insulin Syringe U/F 31G X 5/16\" 0.5 ML MISC Use as directly with weekly methotrexate injection. 100 each 0    Belimumab (BENLYSTA IV) Inject into a catheter in a vein every month to absorb continually Switching to monthly on 12/5      clindamycin (CLEOCIN T) 1 % lotion Apply 1 application. topically 2 (two) times a day      Cyanocobalamin 1000 MCG SUBL Place 1 tablet (1,000 mcg total) under the tongue daily 90 tablet 0    EPINEPHrine (EPIPEN) 0.3 mg/0.3 mL SOAJ Inject 0.3 mL (0.3 mg total) into a muscle once for 1 dose 0.6 mL 0    famotidine (PEPCID) 40 MG tablet Take 1 tablet (40 mg total) by mouth daily at bedtime 90 tablet 3    folic acid (FOLVITE) 1 mg tablet TAKE 1 TABLET BY MOUTH EVERY DAY 90 tablet 3    gabapentin (Neurontin) 600 MG tablet Take 1 tablet (600 mg total) by mouth daily 90 tablet 1    Glucagon (Gvoke HypoPen 2-Pack) 1 MG/0.2ML SOAJ 1 mg PRN for severe hypoglycemia 0.4 mL 0    Glucagon HCl (Glucagon Emergency) 1 MG/ML SOLR       hydroxychloroquine (PLAQUENIL) 200 mg tablet Take 1 tablet (200 mg total) by mouth 2 " "(two) times a day with meals (Patient taking differently: Take 200 mg by mouth 2 (two) times a day with meals 400mg Monday-Friday, 200mg on Saturday and Sunday) 180 tablet 3    Insulin Pen Needle (BD PEN NEEDLE NASIM U/F) 32G X 4 MM MISC by Does not apply route 4 (four) times a day for 180 days 400 each 3    levonorgestrel-ethinyl estradiol (Altavera) 0.15-30 MG-MCG per tablet TAKE 1 TABLET BY MOUTH EVERY DAY 84 tablet 0    levothyroxine 25 mcg tablet Take 1 tablet (25 mcg total) by mouth daily 60 tablet 0    LORazepam (ATIVAN PO) Take by mouth daily as needed      metFORMIN (GLUCOPHAGE-XR) 500 mg 24 hr tablet Take 2 tablets (1,000 mg total) by mouth 2 (two) times a day with meals 360 tablet 2    methotrexate 50 MG/2ML injection Inject 0.8 mL (20 mg total) under the skin once a week 8 mL 4    Naproxen Sodium (ALEVE PO) Take by mouth daily as needed      NovoLOG 100 UNIT/ML injection Up to 100 units per day with insulin pump 90 mL 3    Tresiba FlexTouch 100 units/mL injection pen Inject 32 Units under the skin daily 15 mL 2     No current facility-administered medications on file prior to visit.     Allergies   Allergen Reactions    Shellfish-Derived Products - Food Allergy Anaphylaxis     Octopus, sea snails, claims, etc (ask patient)    Insulin Glargine Other (See Comments)     LANTUS BRAND -Burning and redness under skin       Current Outpatient Medications on File Prior to Visit   Medication Sig Dispense Refill    acetaminophen (TYLENOL) 325 mg tablet Take 3 tablets (975 mg total) by mouth every 8 (eight) hours as needed for mild pain or headaches      BD Insulin Syringe U/F 31G X 5/16\" 0.5 ML MISC Use as directly with weekly methotrexate injection. 100 each 0    Belimumab (BENLYSTA IV) Inject into a catheter in a vein every month to absorb continually Switching to monthly on 12/5      clindamycin (CLEOCIN T) 1 % lotion Apply 1 application. topically 2 (two) times a day      Cyanocobalamin 1000 MCG SUBL Place 1 " tablet (1,000 mcg total) under the tongue daily 90 tablet 0    EPINEPHrine (EPIPEN) 0.3 mg/0.3 mL SOAJ Inject 0.3 mL (0.3 mg total) into a muscle once for 1 dose 0.6 mL 0    famotidine (PEPCID) 40 MG tablet Take 1 tablet (40 mg total) by mouth daily at bedtime 90 tablet 3    folic acid (FOLVITE) 1 mg tablet TAKE 1 TABLET BY MOUTH EVERY DAY 90 tablet 3    gabapentin (Neurontin) 600 MG tablet Take 1 tablet (600 mg total) by mouth daily 90 tablet 1    Glucagon (Gvoke HypoPen 2-Pack) 1 MG/0.2ML SOAJ 1 mg PRN for severe hypoglycemia 0.4 mL 0    Glucagon HCl (Glucagon Emergency) 1 MG/ML SOLR       hydroxychloroquine (PLAQUENIL) 200 mg tablet Take 1 tablet (200 mg total) by mouth 2 (two) times a day with meals (Patient taking differently: Take 200 mg by mouth 2 (two) times a day with meals 400mg Monday-Friday, 200mg on Saturday and Sunday) 180 tablet 3    Insulin Pen Needle (BD PEN NEEDLE NASIM U/F) 32G X 4 MM MISC by Does not apply route 4 (four) times a day for 180 days 400 each 3    levonorgestrel-ethinyl estradiol (Altavera) 0.15-30 MG-MCG per tablet TAKE 1 TABLET BY MOUTH EVERY DAY 84 tablet 0    levothyroxine 25 mcg tablet Take 1 tablet (25 mcg total) by mouth daily 60 tablet 0    LORazepam (ATIVAN PO) Take by mouth daily as needed      metFORMIN (GLUCOPHAGE-XR) 500 mg 24 hr tablet Take 2 tablets (1,000 mg total) by mouth 2 (two) times a day with meals 360 tablet 2    methotrexate 50 MG/2ML injection Inject 0.8 mL (20 mg total) under the skin once a week 8 mL 4    Naproxen Sodium (ALEVE PO) Take by mouth daily as needed      NovoLOG 100 UNIT/ML injection Up to 100 units per day with insulin pump 90 mL 3    Tresiba FlexTouch 100 units/mL injection pen Inject 32 Units under the skin daily 15 mL 2     No current facility-administered medications on file prior to visit.      Social History     Tobacco Use    Smoking status: Never     Passive exposure: Never    Smokeless tobacco: Never    Tobacco comments:     Tobacco  "smoke exposure (Father smokes cigars)   Vaping Use    Vaping status: Never Used   Substance and Sexual Activity    Alcohol use: Yes     Comment: rarely    Drug use: Never    Sexual activity: Yes     Partners: Male     Birth control/protection: Condom Male, OCP     Objective     /82 (BP Location: Left arm, Patient Position: Sitting, Cuff Size: Large)   Pulse 78   Ht 5' 1\" (1.549 m)   Wt 63 kg (139 lb)   BMI 26.26 kg/m²     Physical Exam  Neurologic Exam  Vital signs reviewed.  Well developed, well nourished.  Mood and affect are pleasant.  Speech is fluent and articulate.  Head: Normocephalic, atraumatic  Neck: Neck flexors 5/5  CN 2-12: intact and symmetric, including EOMs which are normal b/l and PERRL  MSK: 5/5 t/o. ROM normal x all 4 extr. No pronator drift.  Sensation: Inact to LT x4 extr. Romberg negative.  Reflexes: 2+ and symmetric in all 4 extr.  Coordination: Nml x4 extr.  Gait: Steady normal gait, tandem gait is steady.      Results/Data:  I have reviewed the results and images from the chart in detail with the patient.    I have reviewed radiology reports from the past few years including: MRI brain and MRI spine.    Administrative Statements   I have spent a total time of 45 minutes in caring for this patient on the day of the visit/encounter including Diagnostic results, Prognosis, Risks and benefits of tx options, Instructions for management, Risk factor reductions, Impressions, Counseling / Coordination of care, Documenting in the medical record, Reviewing / ordering tests, medicine, procedures  , and Obtaining or reviewing history  .    "

## 2024-10-11 ENCOUNTER — HOSPITAL ENCOUNTER (OUTPATIENT)
Dept: INFUSION CENTER | Facility: CLINIC | Age: 29
End: 2024-10-11
Payer: COMMERCIAL

## 2024-10-11 VITALS
BODY MASS INDEX: 26.36 KG/M2 | HEART RATE: 85 BPM | TEMPERATURE: 97.9 F | WEIGHT: 139.5 LBS | SYSTOLIC BLOOD PRESSURE: 101 MMHG | DIASTOLIC BLOOD PRESSURE: 70 MMHG | OXYGEN SATURATION: 99 %

## 2024-10-11 DIAGNOSIS — M32.8 OTHER FORMS OF SYSTEMIC LUPUS ERYTHEMATOSUS, UNSPECIFIED ORGAN INVOLVEMENT STATUS (HCC): Primary | ICD-10-CM

## 2024-10-11 PROCEDURE — 96413 CHEMO IV INFUSION 1 HR: CPT

## 2024-10-11 RX ORDER — DIPHENHYDRAMINE HCL 25 MG
25 TABLET ORAL ONCE
OUTPATIENT
Start: 2024-11-08

## 2024-10-11 RX ORDER — ACETAMINOPHEN 325 MG/1
650 TABLET ORAL ONCE
OUTPATIENT
Start: 2024-11-08

## 2024-10-11 RX ORDER — DIPHENHYDRAMINE HCL 25 MG
25 TABLET ORAL ONCE
Status: COMPLETED | OUTPATIENT
Start: 2024-10-11 | End: 2024-10-11

## 2024-10-11 RX ORDER — SODIUM CHLORIDE 9 MG/ML
20 INJECTION, SOLUTION INTRAVENOUS ONCE
OUTPATIENT
Start: 2024-11-08

## 2024-10-11 RX ORDER — SODIUM CHLORIDE 9 MG/ML
20 INJECTION, SOLUTION INTRAVENOUS ONCE
Status: COMPLETED | OUTPATIENT
Start: 2024-10-11 | End: 2024-10-11

## 2024-10-11 RX ORDER — ACETAMINOPHEN 325 MG/1
650 TABLET ORAL ONCE
Status: COMPLETED | OUTPATIENT
Start: 2024-10-11 | End: 2024-10-11

## 2024-10-11 RX ADMIN — DIPHENHYDRAMINE HYDROCHLORIDE 25 MG: 25 TABLET ORAL at 15:07

## 2024-10-11 RX ADMIN — ACETAMINOPHEN 650 MG: 325 TABLET ORAL at 15:07

## 2024-10-11 RX ADMIN — BELIMUMAB 640 MG: 400 INJECTION, POWDER, LYOPHILIZED, FOR SOLUTION INTRAVENOUS at 16:12

## 2024-10-11 RX ADMIN — SODIUM CHLORIDE 20 ML/HR: 0.9 INJECTION, SOLUTION INTRAVENOUS at 15:08

## 2024-10-11 NOTE — PROGRESS NOTES
Patient to infusion center for Benlysta infusion. Patient offers no complaints. VSS. Patient denies recent illness/infection and or antibiotic use. Peripheral IV inserted without incident.

## 2024-10-11 NOTE — PROGRESS NOTES
Tolerated infusion without incident. Peripheral IV removed. Next appointment confirmed for 11/8/2024 at 1430. AVS offered and declined.

## 2024-10-14 ENCOUNTER — OFFICE VISIT (OUTPATIENT)
Age: 29
End: 2024-10-14
Payer: COMMERCIAL

## 2024-10-14 VITALS
BODY MASS INDEX: 26.92 KG/M2 | DIASTOLIC BLOOD PRESSURE: 68 MMHG | HEIGHT: 61 IN | WEIGHT: 142.6 LBS | SYSTOLIC BLOOD PRESSURE: 102 MMHG

## 2024-10-14 DIAGNOSIS — M25.551 PAIN OF RIGHT HIP: Primary | ICD-10-CM

## 2024-10-14 PROCEDURE — 99213 OFFICE O/P EST LOW 20 MIN: CPT | Performed by: OBSTETRICS & GYNECOLOGY

## 2024-10-14 NOTE — PROGRESS NOTES
"GYN Problem Visit    HPI:  Patient has been struggling with right hip/RLQ pain to the point of limping. MRI imaging of hip incidentally noted \"small vaginal cysts.\"  Patient has not appreciated any cysts.  She has not been sexually active due to pain.  Off OCP's - amenorrheic again.      PMH, PSH, Meds, Allergies, SocHx, FamHx reviewed and no changes noted.    Review of Systems   Constitutional: Negative for chills, fever, malaise/fatigue and night sweats.   Gastrointestinal:  Negative for bloating, abdominal pain, anorexia and nausea.   Genitourinary:  Positive for pelvic pain. Negative for dysuria, genital sores and non-menstrual bleeding.   Neurological:  Negative for dizziness, headaches, sensory change and weakness.     Vitals:    10/14/24 0757   BP: 102/68       Physical Exam  Constitutional:       Appearance: Normal appearance.   Genitourinary:      Bladder and urethral meatus normal.      No lesions in the vagina.      Right Labia: No lesions, skin changes or Bartholin's cyst.     Left Labia: No lesions, skin changes or Bartholin's cyst.     No inguinal adenopathy present in the right or left side.     No vaginal discharge, erythema, bleeding or ulceration.      No vaginal prolapse present.     No vaginal atrophy present.       Right Adnexa: not tender, not full and no mass present.     Left Adnexa: not tender, not full and no mass present.     No cervical motion tenderness.      No parametrium nodularity or thickening present.     Uterus is not enlarged, fixed or tender.      Obturator internus is tender, asymmetrical contractions present and pelvic spasms present.      Levator ani not tender.  HENT:      Head: Normocephalic.   Cardiovascular:      Rate and Rhythm: Normal rate and regular rhythm.   Pulmonary:      Effort: Pulmonary effort is normal.   Abdominal:      General: There is no distension.      Palpations: Abdomen is soft.      Tenderness: There is no abdominal tenderness.      Hernia: No hernia " is present. There is no hernia in the left inguinal area or right inguinal area.   Musculoskeletal:         General: No swelling.   Lymphadenopathy:      Lower Body: No right inguinal adenopathy. No left inguinal adenopathy.   Neurological:      General: No focal deficit present.      Mental Status: She is alert and oriented to person, place, and time.   Skin:     General: Skin is warm and dry.   Psychiatric:         Mood and Affect: Mood normal.         Behavior: Behavior normal.   Vitals reviewed.      Impression/Plan:    Pain of right hip    - no palpable vaginal cysts  - obvious spasm and extreme tenderness palpable in right pelvic floor/ obturator  - follow up with Ortho as planned - no GYN source for pain

## 2024-10-21 ENCOUNTER — OFFICE VISIT (OUTPATIENT)
Dept: OBGYN CLINIC | Facility: MEDICAL CENTER | Age: 29
End: 2024-10-21
Payer: COMMERCIAL

## 2024-10-21 VITALS
SYSTOLIC BLOOD PRESSURE: 112 MMHG | BODY MASS INDEX: 26.81 KG/M2 | HEIGHT: 61 IN | WEIGHT: 142 LBS | DIASTOLIC BLOOD PRESSURE: 71 MMHG | HEART RATE: 83 BPM

## 2024-10-21 DIAGNOSIS — M25.551 PAIN OF RIGHT HIP: ICD-10-CM

## 2024-10-21 DIAGNOSIS — M25.851 FEMOROACETABULAR IMPINGEMENT OF RIGHT HIP: Primary | ICD-10-CM

## 2024-10-21 PROCEDURE — 99214 OFFICE O/P EST MOD 30 MIN: CPT | Performed by: ORTHOPAEDIC SURGERY

## 2024-10-21 NOTE — PROGRESS NOTES
Orthopaedic Surgery - Office Note  Jenny Rodrigues (29 y.o. 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 We will send her for a hip injection for further diagnostics   Referral was given for a right hip US guided injection using local anesthetic  only, encouraged her to keep a pain log so we can determine if this is the main pain generator   Referral given to begin PT   Activity as tolerated   Reviewed CRP results, were are normal   Reviewed MRI during the visit  Follow-up:  Return in about 6 weeks (around 2024) for follow up with Dr. Treviño.      Chief Complaint / Date of Onset  Right hip pain, began in August with no inciting event   Injury Mechanism / Date  None  Surgery / Date  None    History of Present Illness   Jenny Rodrigues is a 29 y.o. female who presents for follow up right hip pain and MRI results. She continues to have a constant pain in her pain, which she describes as deep in her groin, using a c-sign. She feels this pain is constant and worsening with movement of the hip, prolonged WB activity and sitting. She has a severe increase in pain with steps. She has not been able to complete PT due to her pain. She does have a snapping/popping sensation in her hip with rotation of her hip. She does not have any radiating symptoms or symptoms in her back.     Patient does have Lupus and type 1 diabetic     Treatment Summary  Medications / Modalities  Aleve and Tylenol prn with mild temporary relief   Bracing / Immobilization  None  Physical Therapy  Completed a few weeks in [place] but was unable to tolerated and felt no relief   Injections  None  Prior Surgeries  None  Other Treatments  Chinese medicine with no relief       Employment / Current Status  Works from home    "  Sport / Organization / Current Status  N.A      Review of Systems  Pertinent items are noted in HPI.  All other systems were reviewed and are negative.      Physical Exam  /71   Pulse 83   Ht 5' 1\" (1.549 m)   Wt 64.4 kg (142 lb)   BMI 26.83 kg/m²   Cons: Appears well.  No apparent distress.  Psych: Alert. Oriented x3.  Mood and affect normal.  Eyes: PERRLA, EOMI  Resp: Normal effort.  No audible wheezing or stridor.  CV: Palpable pulse.  No discernable arrhythmia.  No LE edema.  Lymph:  No palpable cervical, axillary, or inguinal lymphadenopathy.  Skin: Warm.  No palpable masses.  No visible lesions.  Neuro: Normal muscle tone.  Normal and symmetric DTR's.     Right Hip Exam  Alignment / Posture:  Normal resting hip posture.  Inspection:  No swelling. No ecchymosis.  Palpation:   moderate groin tenderness. No effusion.  ROM:  Hip Flexion 120. Hip ER 60. Hip IR 30.  Strength:  Hip Flexors 4-/5. Hip Abductors 4-/5.  Stability:  (+) Logroll.  Tests:  (+) Stinchfield.  Neurovascular:  Sensation intact in DP/SP/Carrillo/Sa/T nerve distributions. 2+ DP & PT pulses.  Gait:  Antalgic.       Studies Reviewed  I have personally reviewed pertinent films in PACS.  MRI of right hip- images from 10/03/2024 showing CAM deformity   XR of right hip- images from 08/29/2024 no abnormalities or fractures   CT of right hip- 09/04/2024 no concern for AVN, cyst seen in femoral head which is not concerning   MRI of right femur- 09/11/2024 this study was not useful for patients symptoms        Procedures  No procedures today.    Medical, Surgical, Family, and Social History  The patient's medical history, family history, and social history, were reviewed and updated as appropriate.    Past Medical History:   Diagnosis Date    Abdominal pain     Anaphylaxis     Anxiety     Anxiety and depression     COVID-19 12/2020    Delayed emergence from anesthesia 03/23/2023    Severe episode of emergence delirium (confused, combative) after MAC " anesthetic on 03/2023 for EGD. Received versed 2mg, >50mcg precedex, 5mg IV haldol, and 50mcg fentanyl. Waxing and waning episodes of agitation. Any future anesthetics should NOT be done at [facility].    Delirium, induced by drug     anesthesia    Depression     Diabetes mellitus (HCC)     Disease of thyroid gland     Hashimoto    DKA, type 1 (HCC) 05/11/2021    Eating disorder     history of anorexia/bulemia [id number]    Food intolerance     Fracture of fibula     R Salter I    Gilbert disease     Hashimoto's disease 02/21/2020    Head injury     Headache(784.0)     Nasal congestion     PTSD (post-traumatic stress disorder)     Rectal bleed     Seizures (HCC)     Type 1 diabetes (HCC)        Past Surgical History:   Procedure Laterality Date    COLONOSCOPY      EGD  03/23/2023    KNEE SURGERY Right 07/06/2020    NASAL SEPTOPLASTY W/ TURBINOPLASTY      SINUS SURGERY      SKIN BIOPSY      TURBINOPLASTY N/A 03/22/2021    Procedure: TURBINOPLASTY;  Surgeon: Jn Hidalgo MD;  Location: [facility];  Service: ENT    UPPER GASTROINTESTINAL ENDOSCOPY      WISDOM TOOTH EXTRACTION      WRIST SURGERY      left; Excision of ganglion       Family History   Problem Relation Age of Onset    Hypertension Mother     Migraines Mother         Headache    Diabetes type II Mother     Varicose Veins Mother     Hyperlipidemia Mother     Diabetes Mother     Arthritis Mother     Depression Mother     Hearing loss Mother     Anxiety disorder Mother     Eczema Father     Cholelithiasis Father     Hypertension Father     Sarcoidosis Father         Liver    Hyperlipidemia Father     Diabetes Father     Coronary artery disease Father     Nephrolithiasis Father     Cirrhosis Father     Alcohol abuse Father     Thyroid disease Sister     Hashimoto's thyroiditis Sister     Alcohol abuse Brother     Cancer Family     Diabetes Family     Hypertension Family        Social History     Occupational History    Occupation: unemployed   Tobacco Use     "Smoking status: Never     Passive exposure: Never    Smokeless tobacco: Never    Tobacco comments:     Tobacco smoke exposure (Father smokes cigars)   Vaping Use    Vaping status: Never Used   Substance and Sexual Activity    Alcohol use: Yes     Comment: rarely    Drug use: Never    Sexual activity: Yes     Partners: Male     Birth control/protection: Condom Male       Allergies   Allergen Reactions    Shellfish-Derived Products - Food Allergy Anaphylaxis     Octopus, sea snails, claims, etc (ask patient)    Insulin Glargine Other (See Comments)     LANTUS BRAND -Burning and redness under skin          Current Outpatient Medications:     acetaminophen (TYLENOL) 325 mg tablet, Take 3 tablets (975 mg total) by mouth every 8 (eight) hours as needed for mild pain or headaches, Disp: , Rfl:     BD Insulin Syringe U/F 31G X 5/16\" 0.5 ML MISC, Use as directly with weekly methotrexate injection., Disp: 100 each, Rfl: 0    Belimumab (BENLYSTA IV), Inject into a catheter in a vein every month to absorb continually Switching to monthly on 12/5, Disp: , Rfl:     clindamycin (CLEOCIN T) 1 % lotion, Apply 1 application. topically 2 (two) times a day, Disp: , Rfl:     Cyanocobalamin 1000 MCG SUBL, Place 1 tablet (1,000 mcg total) under the tongue daily, Disp: 90 tablet, Rfl: 0    famotidine (PEPCID) 40 MG tablet, Take 1 tablet (40 mg total) by mouth daily at bedtime, Disp: 90 tablet, Rfl: 3    folic acid (FOLVITE) 1 mg tablet, TAKE 1 TABLET BY MOUTH EVERY DAY, Disp: 90 tablet, Rfl: 3    gabapentin (Neurontin) 600 MG tablet, Take 1 tablet (600 mg total) by mouth daily, Disp: 90 tablet, Rfl: 1    Glucagon (Gvoke HypoPen 2-Pack) 1 MG/0.2ML SOAJ, 1 mg PRN for severe hypoglycemia, Disp: 0.4 mL, Rfl: 0    Glucagon HCl (Glucagon Emergency) 1 MG/ML SOLR, , Disp: , Rfl:     levothyroxine 25 mcg tablet, Take 1 tablet (25 mcg total) by mouth daily, Disp: 60 tablet, Rfl: 0    LORazepam (ATIVAN PO), Take by mouth daily as needed, Disp: , Rfl: "     metFORMIN (GLUCOPHAGE-XR) 500 mg 24 hr tablet, Take 2 tablets (1,000 mg total) by mouth 2 (two) times a day with meals, Disp: 360 tablet, Rfl: 2    methotrexate 50 MG/2ML injection, Inject 0.8 mL (20 mg total) under the skin once a week, Disp: 8 mL, Rfl: 4    Naproxen Sodium (ALEVE PO), Take by mouth daily as needed, Disp: , Rfl:     NovoLOG 100 UNIT/ML injection, Up to 100 units per day with insulin pump, Disp: 90 mL, Rfl: 3    rizatriptan (MAXALT-MLT) 10 mg disintegrating tablet, 1 tab at migraine onset, repeat after 2 hours if needed; Max 2 per day and 3 per week., Disp: 9 tablet, Rfl: 2    Tresiba FlexTouch 100 units/mL injection pen, Inject 32 Units under the skin daily, Disp: 15 mL, Rfl: 2    EPINEPHrine (EPIPEN) 0.3 mg/0.3 mL SOAJ, Inject 0.3 mL (0.3 mg total) into a muscle once for 1 dose, Disp: 0.6 mL, Rfl: 0    Galcanezumab-gnlm (Emgality) 120 MG/ML SOAJ, One ml subcutaneous on the right thigh and 1 ml subcutaneous on the left thigh at the same time for 1 dose (Patient not taking: Reported on 10/14/2024), Disp: 2 mL, Rfl: 0    Galcanezumab-gnlm (Emgality) 120 MG/ML SOAJ, 30 days after loading dose, inject 1 pen subq every 30 days. (Patient not taking: Reported on 10/14/2024), Disp: 1 mL, Rfl: 11    hydroxychloroquine (PLAQUENIL) 200 mg tablet, Take 1 tablet (200 mg total) by mouth 2 (two) times a day with meals (Patient taking differently: Take 200 mg by mouth 2 (two) times a day with meals 400mg Monday-Friday, 200mg on Saturday and Sunday), Disp: 180 tablet, Rfl: 3    Insulin Pen Needle (BD PEN NEEDLE NASIM U/F) 32G X 4 MM MISC, by Does not apply route 4 (four) times a day for 180 days, Disp: 400 each, Rfl: 3    ketorolac (TORADOL) 10 mg tablet, Take 1 tablet (10 mg total) by mouth every 6 (six) hours as needed (migraine) Max 2-3 per week. (Patient not taking: Reported on 10/14/2024), Disp: 10 tablet, Rfl: 0    levonorgestrel-ethinyl estradiol (Altavera) 0.15-30 MG-MCG per tablet, TAKE 1 TABLET BY  MOUTH EVERY DAY (Patient not taking: Reported on 10/14/2024), Disp: 84 tablet, Rfl: 0      Maida Blackwood    Scribe Attestation      I,:  Maida Blackwood am acting as a scribe while in the presence of the attending physician.:       I,:  Dell Treviño MD personally performed the services described in this documentation    as scribed in my presence.:

## 2024-10-25 NOTE — PROGRESS NOTES
PT Evaluation     Today's date: 10/29/2024  Patient name: Jenny Rodrigues  : 1995  MRN: [id number]  Referring provider: Dell Treviño, *  Dx:   Encounter Diagnosis     ICD-10-CM    1. Femoroacetabular impingement of right hip  M25.851 Ambulatory Referral to Physical Therapy      2. Pain of right hip  M25.551 Ambulatory Referral to Physical Therapy          Start Time: 0800  Stop Time: 0900  Total time in clinic (min): 60 minutes    Assessment  Impairments: abnormal coordination, abnormal gait, abnormal muscle firing, abnormal muscle tone, abnormal or restricted ROM, abnormal movement, activity intolerance, impaired balance, impaired physical strength, lacks appropriate home exercise program, pain with function, safety issue, weight-bearing intolerance, poor posture , poor body mechanics, unable to perform ADL, sensory processing, participation limitations, activity limitations and endurance  Symptom irritability: high    Assessment details: Jenny Rodrigues is a pleasant 29 y.o. female with a referred dx of right hip pain secondary to diagnosed femoroacetabular (CAM) impingement from Dell Treviño Heckman, MD. Upon further clinical testing, patient presents with the following deficits: significant active and passive R hip motor dysfunction, lumbopelvic-core and hip musculature weakness R>L, potential pelvic instability, high PRINCE, decrease reaction time with laterality testing, decrease WB tolerance, antalgic gait, severe anterior localized hip pain with soft tissue palpation and PROM. She was limited in ability to assess true hip active and passive ROM secondary to pain intensity. Was able to decrease intensity from 8 to 7/10 with ice and working on LLE exercises to promote positive neuroplastic changes and decrease catastrophizing. These deficits and impairments result in significant decrease in functional capacity/tolerance and overall decrease quality of life and wellness. Pt was provided  with a basic HEP focused on contralateral engagement to promote pain management and prevent further deconditioning which will be reviewed in the upcoming session. Pt able to demonstrate HEP with good technique and no significant pain. Educated pt to stop any exercises causing severe pain, however, want to avoid complete sedentary as this does not help with weakness - pt verbalizes understanding. Pt was educated on anatomy and physiology of diagnosis and demonstrated verbal understanding. Positive prognostic indicators include good understanding of diagnosis and treatment plan options and absence of peripheralization. Negative prognostic indicators include anxiety, depression, hx of chronic pain, hypothyroidism, high symptom irritability, minimal changes in pain with movement, multiple prior failed treatments, Lupus, type 2 diabetes, autoimmune conditions, high PRINCE, and additional comorbidites.  Pt would benefit from skilled OP physical therapy to address these, and the below impairments in order to optimize outcomes and promote return to functional baseline.     Patient verbalized understanding of POC.    Please contact me if you have any questions or recommendations. Thank you for the referral and the opportunity to share in Jenny's care      Barriers to intervention: medical complexity  Understanding of Dx/Px/POC: good     Prognosis: fair    Goals    Short Term Goals:  In 2-4 weeks, the patient will:  1. Pt will report at least a 25% reduction in subjective pain complaints/symptoms to better manage ADLs and household chores.   2. Pt will be able to complete LLE exercises and begin gentle RLE isometrics  3. Pt independent with initial HEP, rationale, technique and frequency, for ROM and pain control.  4. Pt will be able to initiate WB exercies      Long Term Goals:  In 10+ weeks, the patient will:  1. Pt will have atleast 4/5 MMT grossly of bilateral LE  2. FOTO to greater than predicted value.  3. Independent  with comprehensive HEP upon discharge.  4. Pt will be able to perform ADLs, iADLS, and household duties with minimal restriction indicating return to PLOF.  5. Pt independent with rationale, technique and plan for performance of advanced HEP to ensure independent self-management of symptoms upon discharge.  6. Pt's pain will be more manageable to tolerate weight bearing exercises and have good understanding of PNE      Plan  Patient would benefit from: PT eval and skilled physical therapy  Referral necessary: No  Planned modality interventions: cryotherapy, biofeedback and TENS    Planned therapy interventions: activity modification, ADL retraining, joint mobilization, manual therapy, massage, balance, balance/weight bearing training, behavior modification, body mechanics training, muscle pump exercises, motor coordination training, nerve gliding, neuromuscular re-education, patient education, postural training, community reintegration, self care, sensory integrative techniques, strengthening, stretching, therapeutic activities, therapeutic exercise, therapeutic training, home exercise program, graded motor, graded exercise, graded activity, functional ROM exercises, gait training, abdominal trunk stabilization, IASTM, patient/caregiver education and transfer training    Frequency: 1x week  Plan of Care beginning date: 10/29/2024  Plan of Care expiration date: 1/21/2025  Treatment plan discussed with: patient        Subjective Evaluation    History of Present Illness  Mechanism of injury: Patient presents for PT initial evaluation regarding concerns of acute right hip pain. Began hurting back in August 2024. Patient saw Ortho and was diagnosed with isolated CAM deformity. However, severity/intensity of symptoms were not consistent with diagnosed pathology. US-guided lidocaine hip injection was ordered for further diagnostics next week 11/5/24. Does have positive for snapping/popping hip sensation that started a  month ago, not as consistent currently.     Denies radicular symptoms. Has history of numbness below left knee for years secondary to autoimmune condition. Inflammatory and autoimmune markers were normal recently, however, appeared to be elevated in September during recent Lupus flare.    She completed physical therapy in [place] when there for 2 weeks but unable to tolerate and felt no relief after. PT consistent mostly of acupuncture and passive stretches. Mild/temporary from OTC NSAIDs.     She works from home and sit in long-sitting position in the bed when working on her laptop.    Aggravating Factors: prolonged WB activity, sitting, step, everything hurts    Personal Functional Goals: not be in pain in walking, sit normally            Not a recurrent problem   Quality of life: good    Patient Goals  Patient goals for therapy: increased strength, independence with ADLs/IADLs, return to sport/leisure activities, increased motion, improved balance and decreased pain    Pain  Current pain rating: 10  At best pain ratin  At worst pain rating: 10  Location: R Hip  Quality: sharp, dull ache, discomfort, squeezing, knife-like, needle-like and pressure  Relieving factors: change in position, ice, medications and support  Aggravating factors: standing, walking, sitting, stair climbing, lifting and running  Progression: worsening    Social Support  Steps to enter house: yes  1  Stairs in house: yes   16  Lives in: multiple-level home    Employment status: working (from home)  Exercise history: Sedentary      Diagnostic Tests  MRI studies: abnormal  Treatments  Previous treatment: physical therapy and medication (Chinese Medicine)  Current treatment: injection treatment and physical therapy        Objective     Static Posture   General Observations  Asymmetrical weight bearing, shifted left and guarded.     Pelvis   Pelvis (Right): Elevated.     Ambulation   Weight-Bearing Status   Weight-Bearing Status (Right):  weight-bearing as tolerated    Assistive device used: none    Ambulation: Level Surfaces   Ambulation with assistive device: independent  Ambulation without assistive device: independent    Quality of Movement During Gait   Trunk    Trunk (Left): Positive left lateral lean over stance limb.     Comments   antalgic      Flowsheet Rows      Flowsheet Row Most Recent Value   PT/OT G-Codes    Current Score 38   Projected Score 65          R MRI 10/3/24:  IMPRESSION:   Cam type femoral acetabular impingement morphology with a tiny synovial herniation pit at the anterior right femoral head and neck junction. No evidence of avascular necrosis or septic arthritis.      Range of Motion: Goniometric revealed the following findings (in degrees).  Joint Motion Right: 10/29/2024 Left:   10/29/2024   Hip Flexion NT Hyper   Hip External Rotation NT Hyper   Hip Internal Rotation NT WNL   Knee Extension 0 0   Knee Flexion Unable WNL     Strength: MMT revealed the following findings.  Joint Motion Right: 10/29/2024 Left:   10/29/2024   Hip Flexion 3/5 P! 3+/5   Hip Abduction Unable Unable   Hip Adduction Unable Unable   Knee Extension 3/5 4-/5   Knee Flexion 3/5 3+/5     Additional Assessments:  Palpation: significant pain with + hip adductors and proximal hip flexor P!, no change with soft tissue palpation along TFL        NEURO Screen  Reflexes  Right Left   Patellar (L3-L4) 2+ 2+   Achilles (S1-S2) 2+ 2+   Phillips's n n   Clonus n                     n       Dermatomes: Protective sensation intact, altered/de  Myotomes: Intact bilaterally    Special Tests:  Test (Structure evaluated) Date: 10/29/2024  ( P / N )   PILLO Unable to yolis P!   FADIR Unable to yolis P!        Precautions: Type 1 DM, Idiopathic Hypotension Psychosocial Disorders, Lupus, Hashimoto's Disease, Hx of Seizures, Fibromyalgia, Chronic Pain    POC expires Unit limit Auth Expiration date PT/OT + Visit Limit?   1/21/24 BOMN 10/21/25 BOMN  60 PCY         Visit/Unit  Tracking  AUTH Status:  Date 10/29        60 PCY Used 1         Remaining  59           Pertinent Findings:                                                                                        Test / Measure  10/29/24   FOTO (Predicted 38) 65   R Hip 3/5 P!   R Hip AROM Unable in supine   significant pain with + hip adductors and proximal hip flexor P!, no change with soft tissue palpation along TFL      Access Code: [email or web address]  URL: [email or web address]  Date: 10/29/2024  Prepared by: Carmela Newton    Exercises  - Supine Quad Set  - 1 x daily - 7 x weekly - 2 sets - 10 reps - 5-10 second hold  - Active Straight Leg Raise with Quad Set  - 1 x daily - 7 x weekly - 2 sets - 5-8 reps    Manuals 10/29                    R Hip PROM   AR                    Gentle Hip Femoral Distraction                     Hip Mobilization Grade 1-2  Posterior/Inferior Glide                     Neuro Re-Ed                     HEP/Patient Education/Modalities AR 38'                    Hooklying TrA Iso Ball Presses                      Hooklying Hip Abductor Iso against mob belt                      Hookyling Hip Adductor Iso to Block                      Prone Hip IR/ER Isometrics                                                                                           Ther Ex                       NuStep  Seat:                       R Hip 3-way (abd,add,ext)                      Bridge                      SL Clamshells                       Standing Hip Abd/Ext                                                                                               Ther Activity                                                                       Gait Training                                                                       Modalities

## 2024-10-29 ENCOUNTER — EVALUATION (OUTPATIENT)
Dept: PHYSICAL THERAPY | Facility: REHABILITATION | Age: 29
End: 2024-10-29
Payer: COMMERCIAL

## 2024-10-29 DIAGNOSIS — M25.851 FEMOROACETABULAR IMPINGEMENT OF RIGHT HIP: ICD-10-CM

## 2024-10-29 DIAGNOSIS — M25.551 PAIN OF RIGHT HIP: Primary | ICD-10-CM

## 2024-10-29 PROCEDURE — 97112 NEUROMUSCULAR REEDUCATION: CPT

## 2024-10-29 PROCEDURE — 97140 MANUAL THERAPY 1/> REGIONS: CPT

## 2024-10-29 PROCEDURE — 97161 PT EVAL LOW COMPLEX 20 MIN: CPT

## 2024-10-30 ENCOUNTER — OFFICE VISIT (OUTPATIENT)
Dept: GASTROENTEROLOGY | Facility: CLINIC | Age: 29
End: 2024-10-30
Payer: COMMERCIAL

## 2024-10-30 VITALS
BODY MASS INDEX: 26.24 KG/M2 | WEIGHT: 139 LBS | DIASTOLIC BLOOD PRESSURE: 81 MMHG | HEART RATE: 110 BPM | HEIGHT: 61 IN | SYSTOLIC BLOOD PRESSURE: 107 MMHG

## 2024-10-30 DIAGNOSIS — R10.10 PAIN OF UPPER ABDOMEN: Primary | ICD-10-CM

## 2024-10-30 DIAGNOSIS — R10.13 DYSPEPSIA: ICD-10-CM

## 2024-10-30 PROCEDURE — 99214 OFFICE O/P EST MOD 30 MIN: CPT | Performed by: INTERNAL MEDICINE

## 2024-10-30 RX ORDER — SUCRALFATE 1 G/1
1 TABLET ORAL 4 TIMES DAILY
Qty: 40 TABLET | Refills: 0 | Status: SHIPPED | OUTPATIENT
Start: 2024-10-30

## 2024-10-30 RX ORDER — PANTOPRAZOLE SODIUM 40 MG/1
40 TABLET, DELAYED RELEASE ORAL DAILY
Qty: 30 TABLET | Refills: 11 | Status: SHIPPED | OUTPATIENT
Start: 2024-10-30

## 2024-10-30 NOTE — ASSESSMENT & PLAN NOTE
Abdominal pain along with dyspeptic symptoms appear to be most likely functional secondary to nonulcer dyspepsia but also should consider NSAID induced gastropathy/gastric erosions.  Rule out biliary dyskinesia.    -Check [facility] HIDA scan    -Advised avoid NSAIDs if possible    -Will put her on pantoprazole 40 mg daily and also add Carafate for a week to 10 days    -Schedule EGD    -Patient was explained about the lifestyle and dietary modifications.  Advised to avoid fatty foods, chocolates, caffeine, alcohol and any other triggering foods.  Avoid eating for at least 3 hours before going to bed.    -Check celiac disease panel and food allergy profile    -Patient was explained about  the risks and benefits of the procedure. Risks including but not limited to bleeding, infection, perforation were explained in detail. Also explained about less than 100% sensitivity with the exam and other alternatives.    -Discussed with patient in detail about pathophysiology and the treatment plan for IBS.  Discussed about trying amitriptyline if the above workup comes back negative

## 2024-10-30 NOTE — H&P (VIEW-ONLY)
Follow-up Note -  [facility]  Jenny Rodrigues 1995 female         ASSESSMENT & PLAN:    Pain of upper abdomen  Abdominal pain along with dyspeptic symptoms appear to be most likely functional secondary to nonulcer dyspepsia but also should consider NSAID induced gastropathy/gastric erosions.  Rule out biliary dyskinesia.    -Check [facility] HIDA scan    -Advised avoid NSAIDs if possible    -Will put her on pantoprazole 40 mg daily and also add Carafate for a week to 10 days    -Schedule EGD    -Patient was explained about the lifestyle and dietary modifications.  Advised to avoid fatty foods, chocolates, caffeine, alcohol and any other triggering foods.  Avoid eating for at least 3 hours before going to bed.    -Check celiac disease panel and food allergy profile    -Patient was explained about  the risks and benefits of the procedure. Risks including but not limited to bleeding, infection, perforation were explained in detail. Also explained about less than 100% sensitivity with the exam and other alternatives.    -Discussed with patient in detail about pathophysiology and the treatment plan for IBS.  Discussed about trying amitriptyline if the above workup comes back negative    Dyspepsia    -Treatment plan as described above      Reason: Follow-up    HPI:  Ms. Alvarado has chronic GI issues for which she was followed by different providers and the last upper endoscopy was by Dr. Piersno in March 2023 and was last seen by Rebecca in May.  She has history of diabetes mellitus, ketoacidosis, lupus, Hashimoto's, TIAs.  Complaining about chronic right upper quadrant pain which she describes as a dull ache with sharp episodes.  Complaining about associated nausea and vomiting at times.  She had CT scan, MRIs, Doppler exams, gastric emptying study which were unremarkable.  She tried gluten-free, dairy free and low FODMAP diet without any significant help.  Having bowel movements after eating which are  usually soft to formed and occasional mucus in the stool.  Denies any bleeding.  Complaining about gassiness and bloating.  She reports using Aleve and Tylenol regularly for arthritis.  She takes famotidine at bedtime.  Denies any difficulty swallowing at this time.    Chaperon: Ms. Weiss    REVIEW OF SYSTEMS: Review of Systems   Constitutional:  Negative for activity change, appetite change, chills, diaphoresis, fatigue, fever and unexpected weight change.   HENT:  Negative for ear discharge, ear pain, facial swelling, hearing loss, nosebleeds, sore throat, tinnitus and voice change.    Eyes:  Negative for pain, discharge, redness, itching and visual disturbance.   Respiratory:  Negative for apnea, cough, chest tightness, shortness of breath and wheezing.    Cardiovascular:  Negative for chest pain and palpitations.   Gastrointestinal:         As noted in HPI   Endocrine: Negative for cold intolerance, heat intolerance and polyuria.   Genitourinary:  Negative for difficulty urinating, dysuria, flank pain, hematuria and urgency.   Musculoskeletal:  Positive for arthralgias and myalgias. Negative for back pain, gait problem and joint swelling.   Skin:  Negative for rash and wound.   Neurological:  Negative for dizziness, tremors, seizures, speech difficulty, light-headedness, numbness and headaches.   Hematological:  Negative for adenopathy. Does not bruise/bleed easily.   Psychiatric/Behavioral:  Negative for agitation, behavioral problems and confusion. The patient is not nervous/anxious.         Past Medical History:   Diagnosis Date    Abdominal pain     Anaphylaxis     Anxiety     Anxiety and depression     COVID-19 12/2020    Delayed emergence from anesthesia 03/23/2023    Severe episode of emergence delirium (confused, combative) after MAC anesthetic on 03/2023 for EGD. Received versed 2mg, >50mcg precedex, 5mg IV haldol, and 50mcg fentanyl. Waxing and waning episodes of agitation. Any future anesthetics  should NOT be done at [facility].    Delirium, induced by drug     anesthesia    Depression     Diabetes mellitus (HCC)     Disease of thyroid gland     Hashimoto    DKA, type 1 (HCC) 05/11/2021    Eating disorder     history of anorexia/bulemia 7020-4583    Food intolerance     Fracture of fibula     R Salter I    Gilbert disease     Hashimoto's disease 02/21/2020    Head injury     Headache(784.0)     Nasal congestion     PTSD (post-traumatic stress disorder)     Rectal bleed     Seizures (HCC)     Type 1 diabetes (HCC)       Past Surgical History:   Procedure Laterality Date    COLONOSCOPY      EGD  03/23/2023    KNEE SURGERY Right 07/06/2020    NASAL SEPTOPLASTY W/ TURBINOPLASTY      SINUS SURGERY      SKIN BIOPSY      TURBINOPLASTY N/A 03/22/2021    Procedure: TURBINOPLASTY;  Surgeon: Jn Hidalgo MD;  Location: [facility];  Service: ENT    UPPER GASTROINTESTINAL ENDOSCOPY      WISDOM TOOTH EXTRACTION      WRIST SURGERY      left; Excision of ganglion     Social History     Socioeconomic History    Marital status: /Civil Union     Spouse name: Not on file    Number of children: 0    Years of education: Not on file    Highest education level: Associate degree: academic program   Occupational History    Occupation: unemployed   Tobacco Use    Smoking status: Never     Passive exposure: Never    Smokeless tobacco: Never    Tobacco comments:     Tobacco smoke exposure (Father smokes cigars)   Vaping Use    Vaping status: Never Used   Substance and Sexual Activity    Alcohol use: Yes     Comment: rarely    Drug use: Never    Sexual activity: Yes     Partners: Male     Birth control/protection: Condom Male   Other Topics Concern    Not on file   Social History Narrative    Student at [employer]    Reports a poor diet; low in vegetables, high in sweets    Dental care, regularly    Lives with parents    Sleeps 8-10 hours a day        Do you have pets? Dog,cat Is pet allowed in bedroom?Yes    Are you a smoker? Never     Does anyone smoke in your home? No       Do you live with smokers? Yes    [place] frequently? No   How many times a year? N/A      Social Determinants of Health     Financial Resource Strain: Low Risk  (7/8/2023)    [facility]    Overall Financial Resource Strain (CARDIA)     Difficulty of Paying Living Expenses: Not hard at all   Food Insecurity: No Food Insecurity (1/16/2024)    Nursing - Inadequate Food Risk Classification     Worried About Running Out of Food in the Last Year: Never true     Ran Out of Food in the Last Year: Never true     Ran Out of Food in the Last Year: Not on file   Transportation Needs: No Transportation Needs (1/16/2024)    PRAPARE - Transportation     Lack of Transportation (Medical): No     Lack of Transportation (Non-Medical): No   Physical Activity: Insufficiently Active (8/7/2020)    Exercise Vital Sign     Days of Exercise per Week: 7 days     Minutes of Exercise per Session: 10 min   Stress: Stress Concern Present (8/7/2020)    [employer] [place] [employer] - Occupational Stress Questionnaire     Feeling of Stress : Rather much   Social Connections: Unknown (8/7/2020)    Social Connection and Isolation Panel [NHANES]     Frequency of Communication with Friends and Family: More than three times a week     Frequency of Social Gatherings with Friends and Family: Not on file     Attends [employer] Services: Not on file     Active Member of Clubs or Organizations: No     Attends Club or Organization Meetings: Never     Marital Status: Never    Intimate Partner Violence: Not At Risk (7/8/2023)    [facility]    Humiliation, Afraid, Rape, and Kick questionnaire     Fear of Current or Ex-Partner: No     Emotionally Abused: No     Physically Abused: No     Sexually Abused: No   Housing Stability: Unknown (1/16/2024)    Housing Stability Vital Sign      Unable to Pay for Housing in the Last Year: No     Number of Times Moved in the Last Year: Not on file     Homeless in the Last Year: No     Family History   Problem Relation Age of Onset    Hypertension Mother     Migraines Mother         Headache    Diabetes type II Mother     Varicose Veins Mother     Hyperlipidemia Mother     Diabetes Mother     Arthritis Mother     Depression Mother     Hearing loss Mother     Anxiety disorder Mother     Eczema Father     Cholelithiasis Father     Hypertension Father     Sarcoidosis Father         Liver    Hyperlipidemia Father     Diabetes Father     Coronary artery disease Father     Nephrolithiasis Father     Cirrhosis Father     Alcohol abuse Father     Thyroid disease Sister     Hashimoto's thyroiditis Sister     Alcohol abuse Brother     Cancer Family     Diabetes Family     Hypertension Family      Shellfish-derived products - food allergy and Insulin glargine  Current Outpatient Medications   Medication Sig Dispense Refill    acetaminophen (TYLENOL) 325 mg tablet Take 3 tablets (975 mg total) by mouth every 8 (eight) hours as needed for mild pain or headaches      Belimumab (BENLYSTA IV) Inject into a catheter in a vein every month to absorb continually Switching to monthly on 12/5      clindamycin (CLEOCIN T) 1 % lotion Apply 1 application. topically 2 (two) times a day      Cyanocobalamin 1000 MCG SUBL Place 1 tablet (1,000 mcg total) under the tongue daily 90 tablet 0    famotidine (PEPCID) 40 MG tablet Take 1 tablet (40 mg total) by mouth daily at bedtime 90 tablet 3    folic acid (FOLVITE) 1 mg tablet TAKE 1 TABLET BY MOUTH EVERY DAY 90 tablet 3    gabapentin (Neurontin) 600 MG tablet Take 1 tablet (600 mg total) by mouth daily 90 tablet 1    Glucagon (Gvoke HypoPen 2-Pack) 1 MG/0.2ML SOAJ 1 mg PRN for severe hypoglycemia 0.4 mL 0    Glucagon HCl (Glucagon Emergency) 1 MG/ML SOLR       levothyroxine 25 mcg tablet Take 1 tablet (25 mcg total) by mouth daily 60  "tablet 0    LORazepam (ATIVAN PO) Take by mouth daily as needed      metFORMIN (GLUCOPHAGE-XR) 500 mg 24 hr tablet Take 2 tablets (1,000 mg total) by mouth 2 (two) times a day with meals 360 tablet 2    methotrexate 50 MG/2ML injection Inject 0.8 mL (20 mg total) under the skin once a week 8 mL 4    Naproxen Sodium (ALEVE PO) Take by mouth daily as needed      NovoLOG 100 UNIT/ML injection Up to 100 units per day with insulin pump 90 mL 3    pantoprazole (PROTONIX) 40 mg tablet Take 1 tablet (40 mg total) by mouth daily 30 tablet 11    rizatriptan (MAXALT-MLT) 10 mg disintegrating tablet 1 tab at migraine onset, repeat after 2 hours if needed; Max 2 per day and 3 per week. 9 tablet 2    sucralfate (CARAFATE) 1 g tablet Take 1 tablet (1 g total) by mouth 4 (four) times a day 40 tablet 0    Tresiba FlexTouch 100 units/mL injection pen Inject 32 Units under the skin daily 15 mL 2    BD Insulin Syringe U/F 31G X 5/16\" 0.5 ML MISC Use as directly with weekly methotrexate injection. (Patient not taking: Reported on 10/30/2024) 100 each 0    EPINEPHrine (EPIPEN) 0.3 mg/0.3 mL SOAJ Inject 0.3 mL (0.3 mg total) into a muscle once for 1 dose 0.6 mL 0    Galcanezumab-gnlm (Emgality) 120 MG/ML SOAJ One ml subcutaneous on the right thigh and 1 ml subcutaneous on the left thigh at the same time for 1 dose (Patient not taking: Reported on 10/14/2024) 2 mL 0    Galcanezumab-gnlm (Emgality) 120 MG/ML SOAJ 30 days after loading dose, inject 1 pen subq every 30 days. (Patient not taking: Reported on 10/14/2024) 1 mL 11    hydroxychloroquine (PLAQUENIL) 200 mg tablet Take 1 tablet (200 mg total) by mouth 2 (two) times a day with meals (Patient taking differently: Take 200 mg by mouth 2 (two) times a day with meals 400mg Monday-Friday, 200mg on Saturday and Sunday) 180 tablet 3    Insulin Pen Needle (BD PEN NEEDLE NASIM U/F) 32G X 4 MM MISC by Does not apply route 4 (four) times a day for 180 days 400 each 3    ketorolac (TORADOL) 10 mg " "tablet Take 1 tablet (10 mg total) by mouth every 6 (six) hours as needed (migraine) Max 2-3 per week. (Patient not taking: Reported on 10/14/2024) 10 tablet 0    levonorgestrel-ethinyl estradiol (Altavera) 0.15-30 MG-MCG per tablet TAKE 1 TABLET BY MOUTH EVERY DAY (Patient not taking: Reported on 10/14/2024) 84 tablet 0     No current facility-administered medications for this visit.       Blood pressure 107/81, pulse (!) 110, height 5' 1\" (1.549 m), weight 63 kg (139 lb), not currently breastfeeding.    PHYSICAL EXAM: Physical Exam  Constitutional:       Appearance: Normal appearance. She is well-developed.   HENT:      Head: Normocephalic and atraumatic.      Nose: Nose normal.   Eyes:      Conjunctiva/sclera: Conjunctivae normal.   Neck:      Thyroid: No thyromegaly.      Vascular: No JVD.      Trachea: No tracheal deviation.   Cardiovascular:      Rate and Rhythm: Normal rate and regular rhythm.      Heart sounds: Normal heart sounds. No murmur heard.     No friction rub. No gallop.   Pulmonary:      Effort: Pulmonary effort is normal. No respiratory distress.      Breath sounds: Normal breath sounds. No wheezing or rales.   Abdominal:      General: Bowel sounds are normal. There is no distension.      Palpations: Abdomen is soft. There is no mass.      Tenderness: There is no abdominal tenderness. There is no guarding.      Hernia: No hernia is present.   Musculoskeletal:         General: No tenderness or deformity.      Cervical back: Neck supple.      Right lower leg: No edema.      Left lower leg: No edema.   Lymphadenopathy:      Cervical: No cervical adenopathy.   Skin:     General: Skin is warm and dry.      Findings: No erythema or rash.   Neurological:      Mental Status: She is alert and oriented to person, place, and time.   Psychiatric:         Mood and Affect: Mood normal.         Behavior: Behavior normal.         Thought Content: Thought content normal.          Lab Results   Component Value Date "    WBC 9.87 09/04/2024    HGB 12.5 09/04/2024    HCT 38.1 09/04/2024    MCV 90 09/04/2024     09/04/2024     Lab Results   Component Value Date    GLUCOSE 342 (H) 01/24/2019    CALCIUM 8.4 05/05/2024    K 3.7 05/05/2024    CO2 21 05/05/2024     05/05/2024    BUN 18 09/10/2024    CREATININE 0.65 09/10/2024     Lab Results   Component Value Date    ALT 20 05/03/2024    AST 26 05/03/2024    GGT 10 06/26/2019    ALKPHOS 91 05/03/2024     Lab Results   Component Value Date    INR 0.95 01/14/2024    INR 0.99 10/26/2022    INR 1.00 10/22/2019    PROTIME 13.3 01/14/2024    PROTIME 13.3 10/26/2022    PROTIME 12.6 10/22/2019       No results found.

## 2024-10-30 NOTE — PROGRESS NOTES
Follow-up Note -  [facility]  Jenny Rodrigues 1995 female         ASSESSMENT & PLAN:    Pain of upper abdomen  Abdominal pain along with dyspeptic symptoms appear to be most likely functional secondary to nonulcer dyspepsia but also should consider NSAID induced gastropathy/gastric erosions.  Rule out biliary dyskinesia.    -Check [facility] HIDA scan    -Advised avoid NSAIDs if possible    -Will put her on pantoprazole 40 mg daily and also add Carafate for a week to 10 days    -Schedule EGD    -Patient was explained about the lifestyle and dietary modifications.  Advised to avoid fatty foods, chocolates, caffeine, alcohol and any other triggering foods.  Avoid eating for at least 3 hours before going to bed.    -Check celiac disease panel and food allergy profile    -Patient was explained about  the risks and benefits of the procedure. Risks including but not limited to bleeding, infection, perforation were explained in detail. Also explained about less than 100% sensitivity with the exam and other alternatives.    -Discussed with patient in detail about pathophysiology and the treatment plan for IBS.  Discussed about trying amitriptyline if the above workup comes back negative    Dyspepsia    -Treatment plan as described above      Reason: Follow-up    HPI:  Ms. Alvarado has chronic GI issues for which she was followed by different providers and the last upper endoscopy was by Dr. Pierson in March 2023 and was last seen by Rebecca in May.  She has history of diabetes mellitus, ketoacidosis, lupus, Hashimoto's, TIAs.  Complaining about chronic right upper quadrant pain which she describes as a dull ache with sharp episodes.  Complaining about associated nausea and vomiting at times.  She had CT scan, MRIs, Doppler exams, gastric emptying study which were unremarkable.  She tried gluten-free, dairy free and low FODMAP diet without any significant help.  Having bowel movements after eating which are  usually soft to formed and occasional mucus in the stool.  Denies any bleeding.  Complaining about gassiness and bloating.  She reports using Aleve and Tylenol regularly for arthritis.  She takes famotidine at bedtime.  Denies any difficulty swallowing at this time.    Chaperon: Ms. Weiss    REVIEW OF SYSTEMS: Review of Systems   Constitutional:  Negative for activity change, appetite change, chills, diaphoresis, fatigue, fever and unexpected weight change.   HENT:  Negative for ear discharge, ear pain, facial swelling, hearing loss, nosebleeds, sore throat, tinnitus and voice change.    Eyes:  Negative for pain, discharge, redness, itching and visual disturbance.   Respiratory:  Negative for apnea, cough, chest tightness, shortness of breath and wheezing.    Cardiovascular:  Negative for chest pain and palpitations.   Gastrointestinal:         As noted in HPI   Endocrine: Negative for cold intolerance, heat intolerance and polyuria.   Genitourinary:  Negative for difficulty urinating, dysuria, flank pain, hematuria and urgency.   Musculoskeletal:  Positive for arthralgias and myalgias. Negative for back pain, gait problem and joint swelling.   Skin:  Negative for rash and wound.   Neurological:  Negative for dizziness, tremors, seizures, speech difficulty, light-headedness, numbness and headaches.   Hematological:  Negative for adenopathy. Does not bruise/bleed easily.   Psychiatric/Behavioral:  Negative for agitation, behavioral problems and confusion. The patient is not nervous/anxious.         Past Medical History:   Diagnosis Date    Abdominal pain     Anaphylaxis     Anxiety     Anxiety and depression     COVID-19 12/2020    Delayed emergence from anesthesia 03/23/2023    Severe episode of emergence delirium (confused, combative) after MAC anesthetic on 03/2023 for EGD. Received versed 2mg, >50mcg precedex, 5mg IV haldol, and 50mcg fentanyl. Waxing and waning episodes of agitation. Any future anesthetics  should NOT be done at [facility].    Delirium, induced by drug     anesthesia    Depression     Diabetes mellitus (HCC)     Disease of thyroid gland     Hashimoto    DKA, type 1 (HCC) 05/11/2021    Eating disorder     history of anorexia/bulemia 9660-7632    Food intolerance     Fracture of fibula     R Salter I    Gilbert disease     Hashimoto's disease 02/21/2020    Head injury     Headache(784.0)     Nasal congestion     PTSD (post-traumatic stress disorder)     Rectal bleed     Seizures (HCC)     Type 1 diabetes (HCC)       Past Surgical History:   Procedure Laterality Date    COLONOSCOPY      EGD  03/23/2023    KNEE SURGERY Right 07/06/2020    NASAL SEPTOPLASTY W/ TURBINOPLASTY      SINUS SURGERY      SKIN BIOPSY      TURBINOPLASTY N/A 03/22/2021    Procedure: TURBINOPLASTY;  Surgeon: Jn Hidalgo MD;  Location: [facility];  Service: ENT    UPPER GASTROINTESTINAL ENDOSCOPY      WISDOM TOOTH EXTRACTION      WRIST SURGERY      left; Excision of ganglion     Social History     Socioeconomic History    Marital status: /Civil Union     Spouse name: Not on file    Number of children: 0    Years of education: Not on file    Highest education level: Associate degree: academic program   Occupational History    Occupation: unemployed   Tobacco Use    Smoking status: Never     Passive exposure: Never    Smokeless tobacco: Never    Tobacco comments:     Tobacco smoke exposure (Father smokes cigars)   Vaping Use    Vaping status: Never Used   Substance and Sexual Activity    Alcohol use: Yes     Comment: rarely    Drug use: Never    Sexual activity: Yes     Partners: Male     Birth control/protection: Condom Male   Other Topics Concern    Not on file   Social History Narrative    Student at [employer]    Reports a poor diet; low in vegetables, high in sweets    Dental care, regularly    Lives with parents    Sleeps 8-10 hours a day        Do you have pets? Dog,cat Is pet allowed in bedroom?Yes    Are you a smoker? Never     Does anyone smoke in your home? No       Do you live with smokers? Yes    [place] frequently? No   How many times a year? N/A      Social Determinants of Health     Financial Resource Strain: Low Risk  (7/8/2023)    [facility], [facility]    Overall Financial Resource Strain (CARDIA)     Difficulty of Paying Living Expenses: Not hard at all   Food Insecurity: No Food Insecurity (1/16/2024)    Nursing - Inadequate Food Risk Classification     Worried About Running Out of Food in the Last Year: Never true     Ran Out of Food in the Last Year: Never true     Ran Out of Food in the Last Year: Not on file   Transportation Needs: No Transportation Needs (1/16/2024)    PRAPARE - Transportation     Lack of Transportation (Medical): No     Lack of Transportation (Non-Medical): No   Physical Activity: Insufficiently Active (8/7/2020)    Exercise Vital Sign     Days of Exercise per Week: 7 days     Minutes of Exercise per Session: 10 min   Stress: Stress Concern Present (8/7/2020)    [employer] [place] [employer] - Occupational Stress Questionnaire     Feeling of Stress : Rather much   Social Connections: Unknown (8/7/2020)    Social Connection and Isolation Panel [NHANES]     Frequency of Communication with Friends and Family: More than three times a week     Frequency of Social Gatherings with Friends and Family: Not on file     Attends Yazidism Services: Not on file     Active Member of Clubs or Organizations: No     Attends Club or Organization Meetings: Never     Marital Status: Never    Intimate Partner Violence: Not At Risk (7/8/2023)    Received from [facility], [facility]    Humiliation, Afraid, Rape, and Kick questionnaire     Fear of Current or Ex-Partner: No     Emotionally Abused: No     Physically Abused: No     Sexually Abused: No   Housing Stability: Unknown (1/16/2024)    Housing Stability Vital Sign      Unable to Pay for Housing in the Last Year: No     Number of Times Moved in the Last Year: Not on file     Homeless in the Last Year: No     Family History   Problem Relation Age of Onset    Hypertension Mother     Migraines Mother         Headache    Diabetes type II Mother     Varicose Veins Mother     Hyperlipidemia Mother     Diabetes Mother     Arthritis Mother     Depression Mother     Hearing loss Mother     Anxiety disorder Mother     Eczema Father     Cholelithiasis Father     Hypertension Father     Sarcoidosis Father         Liver    Hyperlipidemia Father     Diabetes Father     Coronary artery disease Father     Nephrolithiasis Father     Cirrhosis Father     Alcohol abuse Father     Thyroid disease Sister     Hashimoto's thyroiditis Sister     Alcohol abuse Brother     Cancer Family     Diabetes Family     Hypertension Family      Shellfish-derived products - food allergy and Insulin glargine  Current Outpatient Medications   Medication Sig Dispense Refill    acetaminophen (TYLENOL) 325 mg tablet Take 3 tablets (975 mg total) by mouth every 8 (eight) hours as needed for mild pain or headaches      Belimumab (BENLYSTA IV) Inject into a catheter in a vein every month to absorb continually Switching to monthly on 12/5      clindamycin (CLEOCIN T) 1 % lotion Apply 1 application. topically 2 (two) times a day      Cyanocobalamin 1000 MCG SUBL Place 1 tablet (1,000 mcg total) under the tongue daily 90 tablet 0    famotidine (PEPCID) 40 MG tablet Take 1 tablet (40 mg total) by mouth daily at bedtime 90 tablet 3    folic acid (FOLVITE) 1 mg tablet TAKE 1 TABLET BY MOUTH EVERY DAY 90 tablet 3    gabapentin (Neurontin) 600 MG tablet Take 1 tablet (600 mg total) by mouth daily 90 tablet 1    Glucagon (Gvoke HypoPen 2-Pack) 1 MG/0.2ML SOAJ 1 mg PRN for severe hypoglycemia 0.4 mL 0    Glucagon HCl (Glucagon Emergency) 1 MG/ML SOLR       levothyroxine 25 mcg tablet Take 1 tablet (25 mcg total) by mouth daily 60  "tablet 0    LORazepam (ATIVAN PO) Take by mouth daily as needed      metFORMIN (GLUCOPHAGE-XR) 500 mg 24 hr tablet Take 2 tablets (1,000 mg total) by mouth 2 (two) times a day with meals 360 tablet 2    methotrexate 50 MG/2ML injection Inject 0.8 mL (20 mg total) under the skin once a week 8 mL 4    Naproxen Sodium (ALEVE PO) Take by mouth daily as needed      NovoLOG 100 UNIT/ML injection Up to 100 units per day with insulin pump 90 mL 3    pantoprazole (PROTONIX) 40 mg tablet Take 1 tablet (40 mg total) by mouth daily 30 tablet 11    rizatriptan (MAXALT-MLT) 10 mg disintegrating tablet 1 tab at migraine onset, repeat after 2 hours if needed; Max 2 per day and 3 per week. 9 tablet 2    sucralfate (CARAFATE) 1 g tablet Take 1 tablet (1 g total) by mouth 4 (four) times a day 40 tablet 0    Tresiba FlexTouch 100 units/mL injection pen Inject 32 Units under the skin daily 15 mL 2    BD Insulin Syringe U/F 31G X 5/16\" 0.5 ML MISC Use as directly with weekly methotrexate injection. (Patient not taking: Reported on 10/30/2024) 100 each 0    EPINEPHrine (EPIPEN) 0.3 mg/0.3 mL SOAJ Inject 0.3 mL (0.3 mg total) into a muscle once for 1 dose 0.6 mL 0    Galcanezumab-gnlm (Emgality) 120 MG/ML SOAJ One ml subcutaneous on the right thigh and 1 ml subcutaneous on the left thigh at the same time for 1 dose (Patient not taking: Reported on 10/14/2024) 2 mL 0    Galcanezumab-gnlm (Emgality) 120 MG/ML SOAJ 30 days after loading dose, inject 1 pen subq every 30 days. (Patient not taking: Reported on 10/14/2024) 1 mL 11    hydroxychloroquine (PLAQUENIL) 200 mg tablet Take 1 tablet (200 mg total) by mouth 2 (two) times a day with meals (Patient taking differently: Take 200 mg by mouth 2 (two) times a day with meals 400mg Monday-Friday, 200mg on Saturday and Sunday) 180 tablet 3    Insulin Pen Needle (BD PEN NEEDLE NASIM U/F) 32G X 4 MM MISC by Does not apply route 4 (four) times a day for 180 days 400 each 3    ketorolac (TORADOL) 10 mg " "tablet Take 1 tablet (10 mg total) by mouth every 6 (six) hours as needed (migraine) Max 2-3 per week. (Patient not taking: Reported on 10/14/2024) 10 tablet 0    levonorgestrel-ethinyl estradiol (Altavera) 0.15-30 MG-MCG per tablet TAKE 1 TABLET BY MOUTH EVERY DAY (Patient not taking: Reported on 10/14/2024) 84 tablet 0     No current facility-administered medications for this visit.       Blood pressure 107/81, pulse (!) 110, height 5' 1\" (1.549 m), weight 63 kg (139 lb), not currently breastfeeding.    PHYSICAL EXAM: Physical Exam  Constitutional:       Appearance: Normal appearance. She is well-developed.   HENT:      Head: Normocephalic and atraumatic.      Nose: Nose normal.   Eyes:      Conjunctiva/sclera: Conjunctivae normal.   Neck:      Thyroid: No thyromegaly.      Vascular: No JVD.      Trachea: No tracheal deviation.   Cardiovascular:      Rate and Rhythm: Normal rate and regular rhythm.      Heart sounds: Normal heart sounds. No murmur heard.     No friction rub. No gallop.   Pulmonary:      Effort: Pulmonary effort is normal. No respiratory distress.      Breath sounds: Normal breath sounds. No wheezing or rales.   Abdominal:      General: Bowel sounds are normal. There is no distension.      Palpations: Abdomen is soft. There is no mass.      Tenderness: There is no abdominal tenderness. There is no guarding.      Hernia: No hernia is present.   Musculoskeletal:         General: No tenderness or deformity.      Cervical back: Neck supple.      Right lower leg: No edema.      Left lower leg: No edema.   Lymphadenopathy:      Cervical: No cervical adenopathy.   Skin:     General: Skin is warm and dry.      Findings: No erythema or rash.   Neurological:      Mental Status: She is alert and oriented to person, place, and time.   Psychiatric:         Mood and Affect: Mood normal.         Behavior: Behavior normal.         Thought Content: Thought content normal.          Lab Results   Component Value Date "    WBC 9.87 09/04/2024    HGB 12.5 09/04/2024    HCT 38.1 09/04/2024    MCV 90 09/04/2024     09/04/2024     Lab Results   Component Value Date    GLUCOSE 342 (H) 01/24/2019    CALCIUM 8.4 05/05/2024    K 3.7 05/05/2024    CO2 21 05/05/2024     05/05/2024    BUN 18 09/10/2024    CREATININE 0.65 09/10/2024     Lab Results   Component Value Date    ALT 20 05/03/2024    AST 26 05/03/2024    GGT 10 06/26/2019    ALKPHOS 91 05/03/2024     Lab Results   Component Value Date    INR 0.95 01/14/2024    INR 0.99 10/26/2022    INR 1.00 10/22/2019    PROTIME 13.3 01/14/2024    PROTIME 13.3 10/26/2022    PROTIME 12.6 10/22/2019       No results found.

## 2024-10-30 NOTE — PATIENT INSTRUCTIONS
Scheduled date of EGD(as of today): 11/2/24  Physician performing EGD: Dr. Smart  Location of EGD: [facility]  Instructions reviewed with patient by: N.M.  Clearances: JOHN

## 2024-10-31 ENCOUNTER — TELEPHONE (OUTPATIENT)
Age: 29
End: 2024-10-31

## 2024-10-31 NOTE — TELEPHONE ENCOUNTER
Patient called stating she has a procecedure scheduled for Saturday 11/2, she was told to call her endocrinologist and have her pump settings adjusted for the procedure.  Please advise

## 2024-10-31 NOTE — TELEPHONE ENCOUNTER
She is having an Endoscopy done so the procedure will be about 30 minutes. She said she normally has an adverse reaction to anaesthesia so she is asleep longer. She is on a Tandem X2 pump.

## 2024-10-31 NOTE — TELEPHONE ENCOUNTER
Laura calling from [facility] to inform our office,  she is faxing request regarding Emgality PA. She is faxing form to our main fax number .

## 2024-10-31 NOTE — ASSESSMENT & PLAN NOTE
AFP low risk  Continue follow-up with rheumatology  Continue home meds including methotrexate   no sinus pressure

## 2024-10-31 NOTE — TELEPHONE ENCOUNTER
Ronda calling from [employer] to let the team know that the Emgaily have been approved. She is faxing over the approval now.

## 2024-11-01 NOTE — TELEPHONE ENCOUNTER
Called [facility] pharmacy and left a detailed message on their answering machine making them aware of the approval and for a call back if any questions

## 2024-11-02 ENCOUNTER — ANESTHESIA (OUTPATIENT)
Dept: GASTROENTEROLOGY | Facility: HOSPITAL | Age: 29
End: 2024-11-02
Payer: COMMERCIAL

## 2024-11-02 ENCOUNTER — ANESTHESIA EVENT (OUTPATIENT)
Dept: GASTROENTEROLOGY | Facility: HOSPITAL | Age: 29
End: 2024-11-02
Payer: COMMERCIAL

## 2024-11-02 ENCOUNTER — HOSPITAL ENCOUNTER (OUTPATIENT)
Dept: GASTROENTEROLOGY | Facility: HOSPITAL | Age: 29
Setting detail: OUTPATIENT SURGERY
Discharge: HOME | End: 2024-11-02
Attending: INTERNAL MEDICINE
Payer: COMMERCIAL

## 2024-11-02 VITALS
HEIGHT: 62 IN | DIASTOLIC BLOOD PRESSURE: 66 MMHG | RESPIRATION RATE: 18 BRPM | SYSTOLIC BLOOD PRESSURE: 103 MMHG | TEMPERATURE: 97.3 F | OXYGEN SATURATION: 96 % | HEART RATE: 80 BPM | WEIGHT: 135 LBS | BODY MASS INDEX: 24.84 KG/M2

## 2024-11-02 DIAGNOSIS — R10.13 DYSPEPSIA: ICD-10-CM

## 2024-11-02 DIAGNOSIS — R10.10 PAIN OF UPPER ABDOMEN: ICD-10-CM

## 2024-11-02 LAB
EXT PREGNANCY TEST URINE: NEGATIVE
EXT. CONTROL: NORMAL
GLUCOSE SERPL-MCNC: 161 MG/DL (ref 65–140)

## 2024-11-02 PROCEDURE — 43239 EGD BIOPSY SINGLE/MULTIPLE: CPT | Performed by: INTERNAL MEDICINE

## 2024-11-02 PROCEDURE — 88342 IMHCHEM/IMCYTCHM 1ST ANTB: CPT | Performed by: STUDENT IN AN ORGANIZED HEALTH CARE EDUCATION/TRAINING PROGRAM

## 2024-11-02 PROCEDURE — 81025 URINE PREGNANCY TEST: CPT | Performed by: INTERNAL MEDICINE

## 2024-11-02 PROCEDURE — 88305 TISSUE EXAM BY PATHOLOGIST: CPT | Performed by: STUDENT IN AN ORGANIZED HEALTH CARE EDUCATION/TRAINING PROGRAM

## 2024-11-02 PROCEDURE — 82948 REAGENT STRIP/BLOOD GLUCOSE: CPT

## 2024-11-02 RX ORDER — PROPOFOL 10 MG/ML
INJECTION, EMULSION INTRAVENOUS AS NEEDED
Status: DISCONTINUED | OUTPATIENT
Start: 2024-11-02 | End: 2024-11-02

## 2024-11-02 RX ORDER — SODIUM CHLORIDE, SODIUM LACTATE, POTASSIUM CHLORIDE, CALCIUM CHLORIDE 600; 310; 30; 20 MG/100ML; MG/100ML; MG/100ML; MG/100ML
INJECTION, SOLUTION INTRAVENOUS CONTINUOUS PRN
Status: DISCONTINUED | OUTPATIENT
Start: 2024-11-02 | End: 2024-11-02

## 2024-11-02 RX ORDER — LIDOCAINE HYDROCHLORIDE 20 MG/ML
INJECTION, SOLUTION EPIDURAL; INFILTRATION; INTRACAUDAL; PERINEURAL AS NEEDED
Status: DISCONTINUED | OUTPATIENT
Start: 2024-11-02 | End: 2024-11-02

## 2024-11-02 RX ADMIN — PROPOFOL 50 MG: 10 INJECTION, EMULSION INTRAVENOUS at 09:11

## 2024-11-02 RX ADMIN — DEXMEDETOMIDINE 12 MCG: 100 INJECTION, SOLUTION INTRAVENOUS at 09:00

## 2024-11-02 RX ADMIN — PROPOFOL 100 MG: 10 INJECTION, EMULSION INTRAVENOUS at 09:07

## 2024-11-02 RX ADMIN — PROPOFOL 50 MG: 10 INJECTION, EMULSION INTRAVENOUS at 09:09

## 2024-11-02 RX ADMIN — PROPOFOL 20 MG: 10 INJECTION, EMULSION INTRAVENOUS at 09:12

## 2024-11-02 RX ADMIN — DEXMEDETOMIDINE 8 MCG: 100 INJECTION, SOLUTION INTRAVENOUS at 09:03

## 2024-11-02 RX ADMIN — LIDOCAINE HYDROCHLORIDE 60 MG: 20 INJECTION, SOLUTION EPIDURAL; INFILTRATION; INTRACAUDAL at 09:04

## 2024-11-02 RX ADMIN — SODIUM CHLORIDE, SODIUM LACTATE, POTASSIUM CHLORIDE, AND CALCIUM CHLORIDE: .6; .31; .03; .02 INJECTION, SOLUTION INTRAVENOUS at 09:01

## 2024-11-02 NOTE — ANESTHESIA PROCEDURE NOTES
Anesthesia Notable Event    Date/Time: 11/2/2024 10:42 AM    Patient location during procedure: OR procedure room  Performed by: Artem Ogden MD  Authorized by: Artem Ogden MD    Anesthesia notable event Post Operative: refractory post op delerium

## 2024-11-02 NOTE — ANESTHESIA POSTPROCEDURE EVALUATION
Post-Op Assessment Note    CV Status:  Stable         Hydration Status:  Stable   Airway Patency:  Patent     Post Op Vitals Reviewed: Yes    Anethesia notable event occurred.    Staff: Anesthesiologist           Last Filed PACU Vitals:  Vitals Value Taken Time   Temp 97.3 °F (36.3 °C) 11/02/24 1000   Pulse 92 11/02/24 1000   /75 11/02/24 1000   Resp 18 11/02/24 1000   SpO2 95 % 11/02/24 1000       Modified Lexus:  Activity: 2 (11/2/2024 10:09 AM)  Respiration: 2 (11/2/2024 10:09 AM)  Circulation: 2 (11/2/2024 10:09 AM)  Consciousness: 2 (11/2/2024 10:09 AM)  Oxygen Saturation: 2 (11/2/2024 10:09 AM)  Modified Lexus Score: 10 (11/2/2024 10:09 AM)

## 2024-11-02 NOTE — ANESTHESIA PREPROCEDURE EVALUATION
Procedure:  EGD    Relevant Problems   ANESTHESIA   (+) Delayed emergence from anesthesia   (+) History of anesthesia complications (Post-op delirium. Last EGD she required admission to the ED for combativeness and states she was in restraints for a period of time. )      CARDIO   (+) Chronic migraine without aura without status migrainosus, not intractable   (+) Hyperlipidemia   (+) Other chest pain   (+) Vestibular migraine      ENDO   (+) Controlled diabetes mellitus type 1 with complications (HCC)   (+) Hypothyroidism   (+) Type 1 diabetes mellitus (HCC)   (+) Type 1 diabetes mellitus with hyperglycemia (HCC)   (+) Uncontrolled type 1 diabetes mellitus with hyperglycemia (HCC)      GI/HEPATIC   (+) Gastroesophageal reflux disease   (+) Oropharyngeal dysphagia      MUSCULOSKELETAL   (+) Chronic back pain   (+) Fibromyalgia   (+) Left low back pain   (+) Right-sided back pain      NEURO/PSYCH   (+) Chronic abdominal pain   (+) Chronic back pain   (+) Chronic migraine without aura without status migrainosus, not intractable   (+) Chronic post-traumatic stress disorder (PTSD)   (+) Fibromyalgia   (+) Generalized anxiety disorder with panic attacks   (+) Paresthesia   (+) Paresthesia of left leg   (+) Seizures (HCC)   (+) Vestibular migraine   (+) Word finding difficulty        Physical Exam    Airway    Mallampati score: I  TM Distance: >3 FB  Neck ROM: full     Dental   No notable dental hx     Cardiovascular      Pulmonary      Other Findings  post-pubertal.      Anesthesia Plan  ASA Score- 2     Anesthesia Type- IV sedation with anesthesia with ASA Monitors.         Additional Monitors:     Airway Plan:            Plan Factors-Exercise tolerance (METS): >4 METS.    Chart reviewed.   Existing labs reviewed.                   Induction- intravenous.    Postoperative Plan-         Informed Consent- Anesthetic plan and risks discussed with patient.

## 2024-11-05 ENCOUNTER — HOSPITAL ENCOUNTER (OUTPATIENT)
Dept: RADIOLOGY | Facility: MEDICAL CENTER | Age: 29
Discharge: HOME | End: 2024-11-05
Payer: COMMERCIAL

## 2024-11-05 VITALS
TEMPERATURE: 98.3 F | RESPIRATION RATE: 16 BRPM | SYSTOLIC BLOOD PRESSURE: 115 MMHG | DIASTOLIC BLOOD PRESSURE: 80 MMHG | HEART RATE: 93 BPM | OXYGEN SATURATION: 99 %

## 2024-11-05 DIAGNOSIS — M25.851 FEMOROACETABULAR IMPINGEMENT OF RIGHT HIP: ICD-10-CM

## 2024-11-05 DIAGNOSIS — M25.551 PAIN OF RIGHT HIP: ICD-10-CM

## 2024-11-05 PROCEDURE — 20610 DRAIN/INJ JOINT/BURSA W/O US: CPT | Performed by: PHYSICAL MEDICINE & REHABILITATION

## 2024-11-05 PROCEDURE — 77002 NEEDLE LOCALIZATION BY XRAY: CPT | Performed by: PHYSICAL MEDICINE & REHABILITATION

## 2024-11-05 PROCEDURE — 77002 NEEDLE LOCALIZATION BY XRAY: CPT

## 2024-11-05 RX ORDER — ROPIVACAINE HYDROCHLORIDE 2 MG/ML
3 INJECTION, SOLUTION EPIDURAL; INFILTRATION; PERINEURAL ONCE
Status: COMPLETED | OUTPATIENT
Start: 2024-11-05 | End: 2024-11-05

## 2024-11-05 RX ADMIN — IOHEXOL 2 ML: 300 INJECTION, SOLUTION INTRAVENOUS at 11:08

## 2024-11-05 RX ADMIN — ROPIVACAINE HYDROCHLORIDE 3 ML: 2 INJECTION, SOLUTION EPIDURAL; INFILTRATION; PERINEURAL at 11:08

## 2024-11-05 NOTE — H&P
"History of Present Illness: The patient is a 29 y.o. female who presents with complaints of right hip pain    Past Medical History:   Diagnosis Date    Abdominal pain     Anaphylaxis     Anxiety     Anxiety and depression     COVID-19 12/2020    Delayed emergence from anesthesia 03/23/2023    Severe episode of emergence delirium (confused, combative) after MAC anesthetic on 03/2023 for EGD. Received versed 2mg, >50mcg precedex, 5mg IV haldol, and 50mcg fentanyl. Waxing and waning episodes of agitation. Any future anesthetics should NOT be done at [facility].    Delirium, induced by drug     anesthesia    Depression     Diabetes mellitus (HCC)     Disease of thyroid gland     Hashimoto    DKA, type 1 (HCC) 05/11/2021    Eating disorder     history of anorexia/bulemia 7527-6261    Food intolerance     Fracture of fibula     R Salter I    Gilbert disease     Hashimoto's disease 02/21/2020    Head injury     Headache(784.0)     Nasal congestion     PTSD (post-traumatic stress disorder)     Rectal bleed     Seizures (HCC)     Type 1 diabetes (HCC)        Past Surgical History:   Procedure Laterality Date    COLONOSCOPY      EGD  03/23/2023    KNEE SURGERY Right 07/06/2020    NASAL SEPTOPLASTY W/ TURBINOPLASTY      SINUS SURGERY      SKIN BIOPSY      TURBINOPLASTY N/A 03/22/2021    Procedure: TURBINOPLASTY;  Surgeon: Jn Hidalgo MD;  Location: [facility];  Service: ENT    UPPER GASTROINTESTINAL ENDOSCOPY      WISDOM TOOTH EXTRACTION      WRIST SURGERY      left; Excision of ganglion         Current Outpatient Medications:     acetaminophen (TYLENOL) 325 mg tablet, Take 3 tablets (975 mg total) by mouth every 8 (eight) hours as needed for mild pain or headaches, Disp: , Rfl:     BD Insulin Syringe U/F 31G X 5/16\" 0.5 ML MISC, Use as directly with weekly methotrexate injection. (Patient not taking: Reported on 10/30/2024), Disp: 100 each, Rfl: 0    Belimumab (BENLYSTA IV), Inject into a catheter in a vein every month to " absorb continually Switching to monthly on 12/5, Disp: , Rfl:     clindamycin (CLEOCIN T) 1 % lotion, Apply 1 application. topically 2 (two) times a day, Disp: , Rfl:     Cyanocobalamin 1000 MCG SUBL, Place 1 tablet (1,000 mcg total) under the tongue daily, Disp: 90 tablet, Rfl: 0    EPINEPHrine (EPIPEN) 0.3 mg/0.3 mL SOAJ, Inject 0.3 mL (0.3 mg total) into a muscle once for 1 dose, Disp: 0.6 mL, Rfl: 0    famotidine (PEPCID) 40 MG tablet, Take 1 tablet (40 mg total) by mouth daily at bedtime, Disp: 90 tablet, Rfl: 3    folic acid (FOLVITE) 1 mg tablet, TAKE 1 TABLET BY MOUTH EVERY DAY, Disp: 90 tablet, Rfl: 3    gabapentin (Neurontin) 600 MG tablet, Take 1 tablet (600 mg total) by mouth daily, Disp: 90 tablet, Rfl: 1    Galcanezumab-gnlm (Emgality) 120 MG/ML SOAJ, One ml subcutaneous on the right thigh and 1 ml subcutaneous on the left thigh at the same time for 1 dose (Patient not taking: Reported on 10/14/2024), Disp: 2 mL, Rfl: 0    Galcanezumab-gnlm (Emgality) 120 MG/ML SOAJ, 30 days after loading dose, inject 1 pen subq every 30 days. (Patient not taking: Reported on 10/14/2024), Disp: 1 mL, Rfl: 11    Glucagon (Gvoke HypoPen 2-Pack) 1 MG/0.2ML SOAJ, 1 mg PRN for severe hypoglycemia, Disp: 0.4 mL, Rfl: 0    Glucagon HCl (Glucagon Emergency) 1 MG/ML SOLR, , Disp: , Rfl:     hydroxychloroquine (PLAQUENIL) 200 mg tablet, Take 1 tablet (200 mg total) by mouth 2 (two) times a day with meals (Patient taking differently: Take 200 mg by mouth 2 (two) times a day with meals 400mg Monday-Friday, 200mg on Saturday and Sunday), Disp: 180 tablet, Rfl: 3    Insulin Pen Needle (BD PEN NEEDLE NASIM U/F) 32G X 4 MM MISC, by Does not apply route 4 (four) times a day for 180 days, Disp: 400 each, Rfl: 3    ketorolac (TORADOL) 10 mg tablet, Take 1 tablet (10 mg total) by mouth every 6 (six) hours as needed (migraine) Max 2-3 per week. (Patient not taking: Reported on 10/14/2024), Disp: 10 tablet, Rfl: 0    levonorgestrel-ethinyl  estradiol (Altavera) 0.15-30 MG-MCG per tablet, TAKE 1 TABLET BY MOUTH EVERY DAY (Patient not taking: Reported on 10/14/2024), Disp: 84 tablet, Rfl: 0    levothyroxine 25 mcg tablet, Take 1 tablet (25 mcg total) by mouth daily, Disp: 60 tablet, Rfl: 0    LORazepam (ATIVAN PO), Take by mouth daily as needed, Disp: , Rfl:     metFORMIN (GLUCOPHAGE-XR) 500 mg 24 hr tablet, Take 2 tablets (1,000 mg total) by mouth 2 (two) times a day with meals, Disp: 360 tablet, Rfl: 2    methotrexate 50 MG/2ML injection, Inject 0.8 mL (20 mg total) under the skin once a week, Disp: 8 mL, Rfl: 4    Naproxen Sodium (ALEVE PO), Take by mouth daily as needed, Disp: , Rfl:     NovoLOG 100 UNIT/ML injection, Up to 100 units per day with insulin pump, Disp: 90 mL, Rfl: 3    pantoprazole (PROTONIX) 40 mg tablet, Take 1 tablet (40 mg total) by mouth daily, Disp: 30 tablet, Rfl: 11    rizatriptan (MAXALT-MLT) 10 mg disintegrating tablet, 1 tab at migraine onset, repeat after 2 hours if needed; Max 2 per day and 3 per week., Disp: 9 tablet, Rfl: 2    sucralfate (CARAFATE) 1 g tablet, Take 1 tablet (1 g total) by mouth 4 (four) times a day, Disp: 40 tablet, Rfl: 0    Tresiba FlexTouch 100 units/mL injection pen, Inject 32 Units under the skin daily, Disp: 15 mL, Rfl: 2    Allergies   Allergen Reactions    Shellfish-Derived Products - Food Allergy Anaphylaxis     Octopus, sea snails, claims, etc (ask patient)    Insulin Glargine Other (See Comments)     LANTUS BRAND -Burning and redness under skin        Physical Exam:   Vitals:    11/05/24 1055   BP: 116/75   Pulse: 99   Resp: 16   Temp: 98.3 °F (36.8 °C)   SpO2: 98%     General: Awake, Alert, Oriented x 3, Mood and affect appropriate  Respiratory: Respirations even and unlabored  Cardiovascular: Peripheral pulses intact; no edema  Musculoskeletal Exam: right hip pain    ASA Score: 1    Patient/Chart Verification  Patient ID Verified: Verbal  Consents Confirmed: Procedural, To be obtained in the  Pre-Procedure area  H&P( within 30 days) Verified: To be obtained in the Procedural area  Allergies Reviewed: Yes  Anticoag/NSAID held?: NA  Currently on antibiotics?: No  Pregnancy denied?: Yes    Assessment:   1. Femoroacetabular impingement of right hip    2. Pain of right hip        Plan: right hip joint injection, no steroid just local anesthetic

## 2024-11-05 NOTE — DISCHARGE INSTRUCTIONS
Joint Injection   WHAT YOU NEED TO KNOW:   A steroid joint injection is a procedure to inject steroid medicine into a joint. Steroid medicine decreases pain and inflammation. The injection may also contain an anesthetic (numbing medicine) to decrease pain. It may be done to treat conditions such as arthritis, gout, or carpal tunnel syndrome. The injections may be given in your knee, ankle, shoulder, elbow, wrist, ankle or sacroiliac joint.    Do not apply heat to any area that is numb. If you have discomfort or soreness at the injection site, you may apply ice today, 20 minutes on and 20 minutes off. Tomorrow you may use ice or warm, moist heat. Do not apply ice or heat directly to the skin.  You may have an increase or change in the discomfort for 36-48 hours after your treatment. Apply ice and continue with any pain medicine you have been prescribed.  Do not do anything strenuous today. You may shower, but no tub baths or hot tubs today. You may resume your normal activities tomorrow, but do not “overdo it”. Resume normal activities slowly when you are feeling better.  If you experience redness, drainage or swelling at the injection site, or if you develop a fever above 100 degrees, please call [facility] at [phone] or go to the Emergency Room.  Continue to take all routine medicines prescribed by your primary care physician unless otherwise instructed by our staff. Most blood thinners should be started again according to your regularly scheduled dosing. If you have any questions, please give our office a call.    As no general anesthesia was used in today's procedure, you should not experience any side effects related to anesthesia.     If you are diabetic, the steroids used in today's injection may temporarily increase your blood sugar levels after the first few days after your injection. Please keep a close eye on your sugars and alert the doctor who manages your diabetes if your sugars  are significantly high from your baseline or you are symptomatic.     If you have a problem specifically related to your procedure, please call our office at [phone].    Problems not related to your procedure should be directed to your primary care physician.

## 2024-11-06 ENCOUNTER — OFFICE VISIT (OUTPATIENT)
Dept: PHYSICAL THERAPY | Facility: REHABILITATION | Age: 29
End: 2024-11-06
Payer: COMMERCIAL

## 2024-11-06 DIAGNOSIS — M25.851 FEMOROACETABULAR IMPINGEMENT OF RIGHT HIP: Primary | ICD-10-CM

## 2024-11-06 DIAGNOSIS — M25.551 PAIN OF RIGHT HIP: ICD-10-CM

## 2024-11-06 PROCEDURE — 97112 NEUROMUSCULAR REEDUCATION: CPT

## 2024-11-06 PROCEDURE — 97110 THERAPEUTIC EXERCISES: CPT

## 2024-11-06 NOTE — PROGRESS NOTES
Daily Note     Today's date: 2024  Patient name: Jenny Rodrigues  : 1995  MRN: [id number]  Referring provider: Dell Treviño, *  Dx:   Encounter Diagnosis     ICD-10-CM    1. Femoroacetabular impingement of right hip  M25.851       2. Pain of right hip  M25.551           Start Time: 0800  Stop Time: 0955  Total time in clinic (min): 115 minutes    Subjective: Patient states she is feeling better since her Lidocaine hip injection yesterday. She is weary of how long it's going to last. She was able to walk yesterday without a significant limp.       Objective: See treatment diary below      Assessment: Patient tolerated treatment session fair today with focus on PNE, gentle hip and lumbopelvic isometrics. Maintain ice on hip with proper covering throughout the entirety of the session, Despite pain, she was able to perform all exercises with slight adjustment to force production. We continue to discuss potential of autoimmune involvement playing a role in her increase pain sensitivity that parallel her ongoing GI issues. She did have increase pain at the end of the session, however, educated on this is expected due to potential nociplastic nature. Patient will continue to be appropriate for skilled outpatient physical therapy in order to address impairments. 1:1 with Carmela Newton, PT, DPT for entirety of treatment session.        Plan: Continue per plan of care.      Precautions: Type 1 DM, Idiopathic Hypotension Psychosocial Disorders, Lupus, Hashimoto's Disease, Hx of Seizures, Fibromyalgia, Chronic Pain    POC expires Unit limit Auth Expiration date PT/OT + Visit Limit?   24 BOMN 10/21/25 BOMN  60 PCY         Visit/Unit Tracking  AUTH Status:  Date 10/29 11/6       60 PCY Used 1 2        Remaining  59 58          Pertinent Findings:                                                                                        Test / Measure  10/29/24   FOTO (Predicted 38) 65   R Hip 3/5 P!   R Hip  "AROM Unable in supine   significant pain with + hip adductors and proximal hip flexor P!, no change with soft tissue palpation along TFL      Access Code: [email or web address]  Access Code: [email or web address]  URL: [email or web address]  Date: 11/06/2024  Prepared by: Carmela Newton    Exercises  - Supine Quad Set  - 1 x daily - 7 x weekly - 2 sets - 10 reps - 5-10 second hold  - Active Straight Leg Raise with Quad Set  - 1 x daily - 7 x weekly - 2 sets - 5-8 reps  - Supine Isometric Hamstring Set  - 1 x daily - 7 x weekly - 1-2 sets - 10 reps - 5 seconds hold  - Lying Straight Leg Resisted Hip External Rotation  - 1 x daily - 7 x weekly - 2 sets - 10 reps - 2-3 seconds hold  - Long Sitting Isometric Hip Adduction and Extension with Ball at Knees  - 1 x daily - 7 x weekly - 1 sets - 10 reps - 5\" hold    Manuals 10/29 11/6                   R Hip PROM   AR                    Gentle Hip Femoral Distraction                     Hip Abduction AAROM with therapist as assist  AR                   Neuro Re-Ed                     HEP/Patient Education/Modalities AR 38'                    Hooklying TrA Iso Ball Presses   Green ball  5\"x20                   Supine Hip Adductor  To pillow  5\"x20                    Hamstring Set Iso  LLE bent  5\"x10    RLE straight  5\"x10                   Supine Hip ER (resistance at forefoot)    YTB  20x                                           Ther Ex                       NuStep  Seat: 6  Arm: 8   8' L3                   Hip SLR   + Strap  LLE  2x6                   R Ankle Resisted ROM  OTB  20x DF/PF           Bridge                      SL Clamshells                       Standing Hip Abd/Ext                                                                                               Ther Activity                                                                       Gait Training                                                                       Modalities                                        "

## 2024-11-07 PROCEDURE — 88305 TISSUE EXAM BY PATHOLOGIST: CPT | Performed by: STUDENT IN AN ORGANIZED HEALTH CARE EDUCATION/TRAINING PROGRAM

## 2024-11-07 PROCEDURE — 88342 IMHCHEM/IMCYTCHM 1ST ANTB: CPT | Performed by: STUDENT IN AN ORGANIZED HEALTH CARE EDUCATION/TRAINING PROGRAM

## 2024-11-13 ENCOUNTER — OFFICE VISIT (OUTPATIENT)
Dept: PHYSICAL THERAPY | Facility: REHABILITATION | Age: 29
End: 2024-11-13
Payer: COMMERCIAL

## 2024-11-13 ENCOUNTER — ANNUAL EXAM (OUTPATIENT)
Age: 29
End: 2024-11-13
Payer: COMMERCIAL

## 2024-11-13 VITALS
HEIGHT: 62 IN | WEIGHT: 135 LBS | DIASTOLIC BLOOD PRESSURE: 62 MMHG | SYSTOLIC BLOOD PRESSURE: 124 MMHG | BODY MASS INDEX: 24.84 KG/M2

## 2024-11-13 DIAGNOSIS — Z12.4 SCREENING FOR CERVICAL CANCER: ICD-10-CM

## 2024-11-13 DIAGNOSIS — E10.8 CONTROLLED DIABETES MELLITUS TYPE 1 WITH COMPLICATIONS (HCC): ICD-10-CM

## 2024-11-13 DIAGNOSIS — Z01.419 ENCOUNTER FOR ANNUAL ROUTINE GYNECOLOGICAL EXAMINATION: Primary | ICD-10-CM

## 2024-11-13 DIAGNOSIS — M25.851 FEMOROACETABULAR IMPINGEMENT OF RIGHT HIP: Primary | ICD-10-CM

## 2024-11-13 DIAGNOSIS — M25.551 PAIN OF RIGHT HIP: ICD-10-CM

## 2024-11-13 DIAGNOSIS — Z11.3 SCREENING FOR STD (SEXUALLY TRANSMITTED DISEASE): ICD-10-CM

## 2024-11-13 PROCEDURE — 87591 N.GONORRHOEAE DNA AMP PROB: CPT | Performed by: OBSTETRICS & GYNECOLOGY

## 2024-11-13 PROCEDURE — 87491 CHLMYD TRACH DNA AMP PROBE: CPT | Performed by: OBSTETRICS & GYNECOLOGY

## 2024-11-13 PROCEDURE — S0612 ANNUAL GYNECOLOGICAL EXAMINA: HCPCS | Performed by: OBSTETRICS & GYNECOLOGY

## 2024-11-13 PROCEDURE — 97110 THERAPEUTIC EXERCISES: CPT

## 2024-11-13 PROCEDURE — G0145 SCR C/V CYTO,THINLAYER,RESCR: HCPCS | Performed by: OBSTETRICS & GYNECOLOGY

## 2024-11-13 NOTE — PROGRESS NOTES
Daily Note     Today's date: 2024  Patient name: Jenny Rodrigues  : 1995  MRN: [id number]  Referring provider: Dell Treviño, *  Dx:   Encounter Diagnosis     ICD-10-CM    1. Femoroacetabular impingement of right hip  M25.851       2. Pain of right hip  M25.551           Start Time: 0800  Stop Time: 0855  Total time in clinic (min): 55 minutes      Subjective: Patient had a regression over the weekend correlated after performing shopping Saturday. She states pain spiked very high in the anterior hip in addition to feeling of burning. Pain eventually led to clamminess and vomiting. Walking became antalgic and abnormal .      Objective: See treatment diary below      Assessment: Patient tolerated treatment session fair today with focus on PNE, gentle hip and lumbopelvic isometrics. Maintain ice on hip with proper covering throughout the entirety of the session. Was able to perform minimal SLR on RLE with max assist today. We discussed the importance of pacing/graded exposure even on good days to prevent relapse or flares. Patient will continue to be appropriate for skilled outpatient physical therapy in order to address impairments. 1:1 with Carmela Newton, PT, DPT for entirety of treatment session.        Plan: Continue per plan of care.      Precautions: Type 1 DM, Idiopathic Hypotension Psychosocial Disorders, Lupus, Hashimoto's Disease, Hx of Seizures, Fibromyalgia, Chronic Pain    POC expires Unit limit Auth Expiration date PT/OT + Visit Limit?   24 BOMN 10/21/25 BOMN  60 PCY         Visit/Unit Tracking  AUTH Status:  Date 10/29 11/6 11/13      60 PCY Used 1 2 3       Remaining  59 58 57         Pertinent Findings:                                                                                        Test / Measure  10/29/24   FOTO (Predicted 38) 65   R Hip 3/5 P!   R Hip AROM Unable in supine   significant pain with + hip adductors and proximal hip flexor P!, no change with soft  "tissue palpation along TFL      Access Code: [email or web address]  Access Code: [email or web address]  URL: [email or web address]  Date: 11/06/2024  Prepared by: Carmela Newton    Exercises  - Supine Quad Set  - 1 x daily - 7 x weekly - 2 sets - 10 reps - 5-10 second hold  - Active Straight Leg Raise with Quad Set  - 1 x daily - 7 x weekly - 2 sets - 5-8 reps  - Supine Isometric Hamstring Set  - 1 x daily - 7 x weekly - 1-2 sets - 10 reps - 5 seconds hold  - Lying Straight Leg Resisted Hip External Rotation  - 1 x daily - 7 x weekly - 2 sets - 10 reps - 2-3 seconds hold  - Long Sitting Isometric Hip Adduction and Extension with Ball at Knees  - 1 x daily - 7 x weekly - 1 sets - 10 reps - 5\" hold    Manuals 10/29 11/6  11/13                 R Hip PROM   AR   AR                 Gentle Hip Femoral Distraction   oscillations   AR                 Hip Abduction AAROM with therapist as assist  AR  AR                 Neuro Re-Ed                     HEP/Patient Education/Modalities AR 38'                    SAQ with bolster             Hooklying TrA Iso Ball Presses   Green ball  5\"x20  Green ball  5\"x20                 Supine Hip Adductor  To pillow  5\"x20  To pillow  5\"x20                  Hamstring Set Iso  LLE bent  5\"x10    RLE straight  5\"x10  LE bent  5\"x10    RLE straigh                 Supine Hip ER (resistance at forefoot)    YTB  20x YTB  20x                                         Ther Ex                       NuStep  Seat: 8  Arm: 10   8' L3  6' L2                 Hip SLR   + Strap  LLE  2x6  +strap  LLE  2x6    Strap + PT assist  5x                 R Ankle Resisted ROM  OTB  20x DF/PF           Bridge                      SL Clamshells                       Standing Hip Abd/Ext                                                                                               Ther Activity                                                                       Gait Training                                                             "           Modalities

## 2024-11-14 NOTE — PROGRESS NOTES
Assessment/Plan:    1. Encounter for annual routine gynecological examination (Primary)      2. Screening for cervical cancer    - Liquid-based pap, screening    3. Screening for STD (sexually transmitted disease)    - Chlamydia/GC amplified DNA by PCR    4. Controlled diabetes mellitus type 1 with complications (HCC)    Discussed intervention for amenorrhea if it persists past 6 months      Subjective      Jenny Rodrigues is a 29 y.o. female who presents for annual exam. Periods are again rare off of OCP's.  She prefers to stay off for now given ongoing health issues.  She denies any breast or urinary concerns.  Accepts STD screening.    Current contraception: condoms  History of abnormal Pap smear: no  Regular self breast exam: yes  History of abnormal mammogram: no  History of abnormal lipids: no    Menstrual History:  OB History          1    Para   0    Term   0       0    AB   1    Living             SAB   1    IAB   0    Ectopic   0    Multiple        Live Births                     Menarche age: 12  No LMP recorded. (Menstrual status: Amenorrheic other).     Past Medical History:   Diagnosis Date    Abdominal pain     Anaphylaxis     Anxiety     Anxiety and depression     COVID-19 2020    Delayed emergence from anesthesia 2023    Severe episode of emergence delirium (confused, combative) after MAC anesthetic on 2023 for EGD. Received versed 2mg, >50mcg precedex, 5mg IV haldol, and 50mcg fentanyl. Waxing and waning episodes of agitation. Any future anesthetics should NOT be done at [facility].    Delirium, induced by drug     anesthesia    Depression     Diabetes mellitus (HCC)     Disease of thyroid gland     Hashimoto    DKA, type 1 (HCC) 2021    Eating disorder     history of anorexia/bulemia 4303-3579    Food intolerance     Fracture of fibula     R Salter I    Gilbert disease     Hashimoto's disease 2020    Head injury     Headache(784.0)     Nasal congestion     PTSD  "(post-traumatic stress disorder)     Rectal bleed     Seizures (HCC)     Type 1 diabetes (HCC)        The following portions of the patient's history were reviewed and updated as appropriate: allergies, current medications, past family history, past medical history, past social history, past surgical history, and problem list.    Review of Systems  Pertinent items are noted in HPI.      Objective      /62 (BP Location: Right arm, Patient Position: Sitting, Cuff Size: Large)   Ht 5' 1.75\" (1.568 m)   Wt 61.2 kg (135 lb)   BMI 24.89 kg/m²     General:   alert and oriented, in no acute distress   Heart:  Breasts: regular rate and rhythm  breasts appear normal, no suspicious masses, no skin or nipple changes or axillary nodes.   Lungs: Effort normal   Abdomen: soft, non-tender, without masses or organomegaly   Vulva: normal   Vagina: normal mucosa   Cervix: no lesions   Uterus: normal size, mobile, non-tender   Adnexa:  Bladder: normal adnexa and no mass, fullness, tenderness  Non-tender               "

## 2024-11-15 ENCOUNTER — HOSPITAL ENCOUNTER (OUTPATIENT)
Dept: INFUSION CENTER | Facility: CLINIC | Age: 29
End: 2024-11-15
Payer: COMMERCIAL

## 2024-11-15 VITALS
OXYGEN SATURATION: 98 % | WEIGHT: 139 LBS | HEART RATE: 84 BPM | SYSTOLIC BLOOD PRESSURE: 108 MMHG | BODY MASS INDEX: 25.63 KG/M2 | DIASTOLIC BLOOD PRESSURE: 74 MMHG | TEMPERATURE: 97.5 F | RESPIRATION RATE: 16 BRPM

## 2024-11-15 DIAGNOSIS — M32.8 OTHER FORMS OF SYSTEMIC LUPUS ERYTHEMATOSUS, UNSPECIFIED ORGAN INVOLVEMENT STATUS (HCC): Primary | ICD-10-CM

## 2024-11-15 LAB
C TRACH DNA SPEC QL NAA+PROBE: NEGATIVE
N GONORRHOEA DNA SPEC QL NAA+PROBE: NEGATIVE

## 2024-11-15 PROCEDURE — 96413 CHEMO IV INFUSION 1 HR: CPT

## 2024-11-15 RX ORDER — ACETAMINOPHEN 325 MG/1
650 TABLET ORAL ONCE
Status: COMPLETED | OUTPATIENT
Start: 2024-11-15 | End: 2024-11-15

## 2024-11-15 RX ORDER — SODIUM CHLORIDE 9 MG/ML
20 INJECTION, SOLUTION INTRAVENOUS ONCE
Status: COMPLETED | OUTPATIENT
Start: 2024-11-15 | End: 2024-11-15

## 2024-11-15 RX ORDER — DIPHENHYDRAMINE HCL 25 MG
25 TABLET ORAL ONCE
OUTPATIENT
Start: 2024-12-06

## 2024-11-15 RX ORDER — ACETAMINOPHEN 325 MG/1
650 TABLET ORAL ONCE
OUTPATIENT
Start: 2024-12-06

## 2024-11-15 RX ORDER — SODIUM CHLORIDE 9 MG/ML
20 INJECTION, SOLUTION INTRAVENOUS ONCE
OUTPATIENT
Start: 2024-12-06

## 2024-11-15 RX ORDER — DIPHENHYDRAMINE HCL 25 MG
25 TABLET ORAL ONCE
Status: COMPLETED | OUTPATIENT
Start: 2024-11-15 | End: 2024-11-15

## 2024-11-15 RX ADMIN — ACETAMINOPHEN 650 MG: 325 TABLET ORAL at 13:51

## 2024-11-15 RX ADMIN — DIPHENHYDRAMINE HYDROCHLORIDE 25 MG: 25 TABLET ORAL at 13:51

## 2024-11-15 RX ADMIN — SODIUM CHLORIDE 20 ML/HR: 9 INJECTION, SOLUTION INTRAVENOUS at 13:51

## 2024-11-15 RX ADMIN — BELIMUMAB 640 MG: 400 INJECTION, POWDER, LYOPHILIZED, FOR SOLUTION INTRAVENOUS at 15:06

## 2024-11-15 NOTE — PROGRESS NOTES
Pt here for benlysta, offers no complaints, denies recent illness or antibiotic use, resting in chair

## 2024-11-15 NOTE — LETTER
[facility]  [address]  [address]  [place] [address]  Dept: [phone]    November 15, 2024     Patient: Jenny Rodrigues   YOB: 1995   Date of Visit: 11/15/2024       To Whom it May Concern:    Jenny Rodrigues is under my professional care. She was seen in the hospital from 11/15/2024 to 11/15/24. She may return to work without limitation.    If you have any questions or concerns, please don't hesitate to call.         Sincerely,          Nain Spain RN

## 2024-11-18 ENCOUNTER — RESULTS FOLLOW-UP (OUTPATIENT)
Age: 29
End: 2024-11-18

## 2024-11-18 ENCOUNTER — TELEPHONE (OUTPATIENT)
Dept: PAIN MEDICINE | Facility: MEDICAL CENTER | Age: 29
End: 2024-11-18

## 2024-11-18 NOTE — TELEPHONE ENCOUNTER
----- Message from Carolyn Neves PA-C sent at 11/15/2024  4:46 PM EST -----  Regarding: FW: pain diary  Please advise patient she should follow up with ortho regarding injection results. She is not a patient with [facility] (was direct referral for injection only)  ----- Message -----  From: Jenny Marie  Sent: 11/15/2024   2:03 PM EST  To: Nghia Kaur,   Subject: pain diary                                       Hi Dr. Kaur,     Patient dropped off pain diary at the [facility], this document was scanned under media for your review.      Thank you.    Jenny

## 2024-11-18 NOTE — TELEPHONE ENCOUNTER
I did review the pain diary that was uploaded into media which showed that the patient had good benefit after the injection.  We will plan on her following up as scheduled.

## 2024-11-19 ENCOUNTER — TELEPHONE (OUTPATIENT)
Dept: PAIN MEDICINE | Facility: CLINIC | Age: 29
End: 2024-11-19

## 2024-11-19 LAB
LAB AP GYN PRIMARY INTERPRETATION: NORMAL
Lab: NORMAL

## 2024-11-20 ENCOUNTER — OFFICE VISIT (OUTPATIENT)
Dept: PHYSICAL THERAPY | Facility: REHABILITATION | Age: 29
End: 2024-11-20
Payer: COMMERCIAL

## 2024-11-20 DIAGNOSIS — M25.851 FEMOROACETABULAR IMPINGEMENT OF RIGHT HIP: Primary | ICD-10-CM

## 2024-11-20 DIAGNOSIS — M25.551 PAIN OF RIGHT HIP: ICD-10-CM

## 2024-11-20 PROCEDURE — 97112 NEUROMUSCULAR REEDUCATION: CPT

## 2024-11-20 PROCEDURE — 97140 MANUAL THERAPY 1/> REGIONS: CPT

## 2024-11-20 NOTE — PROGRESS NOTES
Daily Note     Today's date: 2024  Patient name: Jenny Rodrigues  : 1995  MRN: [id number]  Referring provider: Dell Treviño, *  Dx:   Encounter Diagnosis     ICD-10-CM    1. Femoroacetabular impingement of right hip  M25.851       2. Pain of right hip  M25.551             Start Time: 0800  Stop Time: 0845  Total time in clinic (min): 45 minutes      Subjective: Patient laid low the last couple of days. Patient's status remains the same, worsening.       Objective: See treatment diary below      Assessment: Patient tolerated a little more hip PROM and AROM. Was able to hold off on ice modalities for half of the session. We continue to dive deeper into other factors of over sensitized pain pathways. She continues to do well with pacing and graded exposure based on her tolerance and listening to her body. Slight elevation in pain at the end of the visit today. Patient will continue to be appropriate for skilled outpatient physical therapy in order to address impairments. 1:1 with Carmela Newton, PT, DPT for entirety of treatment session.        Plan: Continue per plan of care.      Precautions: Type 1 DM, Idiopathic Hypotension Psychosocial Disorders, Lupus, Hashimoto's Disease, Hx of Seizures, Fibromyalgia, Chronic Pain    POC expires Unit limit Auth Expiration date PT/OT + Visit Limit?   24 BOMN 10/21/25 BOMN  60 PCY         Visit/Unit Tracking  AUTH Status:  Date 10/29 11/6 11/13 11/20     60 PCY Used 1 2 3 4      Remaining  59 58 57 56        Pertinent Findings:                                                                                        Test / Measure  10/29/24   FOTO (Predicted 38) 65   R Hip 3/5 P!   R Hip AROM Unable in supine   significant pain with + hip adductors and proximal hip flexor P!, no change with soft tissue palpation along TFL      Access Code: [email or web address]  Access Code: [email or web address]  URL: [email or web address]  Date: 2024  Prepared by: Carmela  "Newton    Exercises  - Supine Quad Set  - 1 x daily - 7 x weekly - 2 sets - 10 reps - 5-10 second hold  - Active Straight Leg Raise with Quad Set  - 1 x daily - 7 x weekly - 2 sets - 5-8 reps  - Supine Isometric Hamstring Set  - 1 x daily - 7 x weekly - 1-2 sets - 10 reps - 5 seconds hold  - Lying Straight Leg Resisted Hip External Rotation  - 1 x daily - 7 x weekly - 2 sets - 10 reps - 2-3 seconds hold  - Long Sitting Isometric Hip Adduction and Extension with Ball at Knees  - 1 x daily - 7 x weekly - 1 sets - 10 reps - 5\" hold    Manuals 10/29 11/6  11/13  11/20               R Hip PROM   AR   AR  AR  B/L               Gentle Hip Femoral Distraction   oscillations   AR  AR               Hip Abduction AAROM with therapist as assist  AR  AR  AR               Neuro Re-Ed                     HEP/Patient Education/Modalities AR 38'                    Alt SAQ with bolster    2x8  B/L         Hooklying TrA Iso Ball Presses   Green ball  5\"x20  Green ball  5\"x20  Green ball  5\"x20               LTR legs on ball    15x         Supine Hip Adductor  To pillow  5\"x20  To pillow  5\"x20  to pillow  5\"x20                Hamstring Set Iso  LLE bent  5\"x10    RLE straight  5\"x10  LE bent  5\"x10    RLE straigh  5\"x10  LE bent  5\"x10    RLE  Straight  5\"x10               Supine Hip ER (resistance at forefoot)    YTB  20x YTB  20x  np                                        Ther Ex                       NuStep  Seat: 8  Arm: 10   8' L3  6' L2 defer               Hip SLR   + Strap  LLE  2x6  +strap  LLE  2x6    Strap + PT assist  5x  NP               Bridge                      SL Clamshells                       Standing Hip Abd/Ext                                                                                               Ther Activity                                                                       Gait Training                                                                       Modalities                                     "

## 2024-11-26 DIAGNOSIS — R10.10 PAIN OF UPPER ABDOMEN: ICD-10-CM

## 2024-11-26 DIAGNOSIS — R10.13 DYSPEPSIA: ICD-10-CM

## 2024-11-27 ENCOUNTER — APPOINTMENT (OUTPATIENT)
Dept: PHYSICAL THERAPY | Facility: REHABILITATION | Age: 29
End: 2024-11-27
Payer: COMMERCIAL

## 2024-11-27 RX ORDER — PANTOPRAZOLE SODIUM 40 MG/1
40 TABLET, DELAYED RELEASE ORAL DAILY
Qty: 90 TABLET | Refills: 1 | Status: SHIPPED | OUTPATIENT
Start: 2024-11-27

## 2024-12-02 ENCOUNTER — NURSE TRIAGE (OUTPATIENT)
Age: 29
End: 2024-12-02

## 2024-12-02 DIAGNOSIS — G43.709 CHRONIC MIGRAINE WITHOUT AURA WITHOUT STATUS MIGRAINOSUS, NOT INTRACTABLE: Primary | ICD-10-CM

## 2024-12-02 NOTE — TELEPHONE ENCOUNTER
"Inbound call received from patient.     Patient reported that she took the first Emgality injection on Saturday night and felt an intense sensation at the injection site. Stated when pushing the medication through, pt felt a sharp pain the spread throughout the area and it felt like a burning sensation which lasted about 5 minutes. Stated that the area was very tender for about 30 minutes around the injection site.     Pt reported that she had a similar reaction when she previously administered a Lantus injection. Pt stated she is not sure if there is a certain additive in the injection that could be causing the reaction. Pt reported that she did not take the second dose of Emgality and is asking to switch to another medication.     Pt denies any current issues around the injection site. Reported that she currently has had some flu-like symptoms that started on Saturday in the AM, sore throat, green mucous, cough. Advised pt to contact her PCP to discuss current flu-like symptoms. Pt reported she will try home remedies first and then contact PCP or go to UC if symptoms worsen.     Paola: please review and advise.       Reason for Disposition   Caller wants to use a complementary or alternative medicine    Answer Assessment - Initial Assessment Questions  1. NAME of MEDICINE: \"What medicine(s) are you calling about?\"      Emgality Injection    2. QUESTION: \"What is your question?\" (e.g., double dose of medicine, side effect)      Side effects/possible site reaction    3. PRESCRIBER: \"Who prescribed the medicine?\" Reason: if prescribed by specialist, call should be referred to that group.      Neurology, Nara Phillips PA-C    4. SYMPTOMS: \"Do you have any symptoms?\" If Yes, ask: \"What symptoms are you having?\"  \"How bad are the symptoms (e.g., mild, moderate, severe)      Not currently. Injection site reactions occurred on Saturday evening after administering the injection.    Protocols used: Medication Question " Call-Adult-[place]

## 2024-12-04 ENCOUNTER — APPOINTMENT (OUTPATIENT)
Dept: RADIOLOGY | Facility: OTHER | Age: 29
End: 2024-12-04
Payer: COMMERCIAL

## 2024-12-04 ENCOUNTER — OFFICE VISIT (OUTPATIENT)
Dept: OBGYN CLINIC | Facility: OTHER | Age: 29
End: 2024-12-04
Payer: COMMERCIAL

## 2024-12-04 ENCOUNTER — APPOINTMENT (OUTPATIENT)
Dept: PHYSICAL THERAPY | Facility: REHABILITATION | Age: 29
End: 2024-12-04
Payer: COMMERCIAL

## 2024-12-04 ENCOUNTER — APPOINTMENT (OUTPATIENT)
Dept: LAB | Age: 29
End: 2024-12-04
Payer: COMMERCIAL

## 2024-12-04 VITALS — HEIGHT: 61 IN | WEIGHT: 139 LBS | BODY MASS INDEX: 26.24 KG/M2

## 2024-12-04 DIAGNOSIS — M32.9 SYSTEMIC LUPUS ERYTHEMATOSUS, UNSPECIFIED SLE TYPE, UNSPECIFIED ORGAN INVOLVEMENT STATUS (HCC): ICD-10-CM

## 2024-12-04 DIAGNOSIS — R10.13 DYSPEPSIA: ICD-10-CM

## 2024-12-04 DIAGNOSIS — E10.649 UNCONTROLLED TYPE 1 DIABETES MELLITUS WITH HYPOGLYCEMIA WITHOUT COMA (HCC): ICD-10-CM

## 2024-12-04 DIAGNOSIS — M35.9 UNDIFFERENTIATED CONNECTIVE TISSUE DISEASE (HCC): ICD-10-CM

## 2024-12-04 DIAGNOSIS — M25.851 FEMOROACETABULAR IMPINGEMENT OF RIGHT HIP: ICD-10-CM

## 2024-12-04 DIAGNOSIS — M25.551 PAIN OF RIGHT HIP: Primary | ICD-10-CM

## 2024-12-04 DIAGNOSIS — Z11.1 SCREENING-PULMONARY TB: ICD-10-CM

## 2024-12-04 DIAGNOSIS — M25.551 PAIN OF RIGHT HIP: ICD-10-CM

## 2024-12-04 DIAGNOSIS — E10.65 TYPE 1 DIABETES MELLITUS WITH HYPERGLYCEMIA (HCC): ICD-10-CM

## 2024-12-04 LAB
ALBUMIN SERPL BCG-MCNC: 4.5 G/DL (ref 3.5–5)
ALP SERPL-CCNC: 73 U/L (ref 34–104)
ALT SERPL W P-5'-P-CCNC: 11 U/L (ref 7–52)
ANION GAP SERPL CALCULATED.3IONS-SCNC: 9 MMOL/L (ref 4–13)
AST SERPL W P-5'-P-CCNC: 17 U/L (ref 13–39)
BACTERIA UR QL AUTO: NORMAL /HPF
BASOPHILS # BLD AUTO: 0.03 THOUSANDS/ÂΜL (ref 0–0.1)
BASOPHILS NFR BLD AUTO: 0 % (ref 0–1)
BILIRUB SERPL-MCNC: 0.77 MG/DL (ref 0.2–1)
BILIRUB UR QL STRIP: NEGATIVE
BUN SERPL-MCNC: 12 MG/DL (ref 5–25)
C3 SERPL-MCNC: 143 MG/DL (ref 87–200)
C4 SERPL-MCNC: 29 MG/DL (ref 19–52)
CALCIUM SERPL-MCNC: 9.2 MG/DL (ref 8.4–10.2)
CHLORIDE SERPL-SCNC: 102 MMOL/L (ref 96–108)
CLARITY UR: CLEAR
CO2 SERPL-SCNC: 27 MMOL/L (ref 21–32)
COLOR UR: NORMAL
CREAT SERPL-MCNC: 0.64 MG/DL (ref 0.6–1.3)
CREAT UR-MCNC: 108.2 MG/DL
CRP SERPL QL: 6.1 MG/L
EOSINOPHIL # BLD AUTO: 0.17 THOUSAND/ÂΜL (ref 0–0.61)
EOSINOPHIL NFR BLD AUTO: 3 % (ref 0–6)
ERYTHROCYTE [DISTWIDTH] IN BLOOD BY AUTOMATED COUNT: 13.2 % (ref 11.6–15.1)
ERYTHROCYTE [SEDIMENTATION RATE] IN BLOOD: 23 MM/HOUR (ref 0–19)
EST. AVERAGE GLUCOSE BLD GHB EST-MCNC: 212 MG/DL
GFR SERPL CREATININE-BSD FRML MDRD: 122 ML/MIN/1.73SQ M
GLUCOSE P FAST SERPL-MCNC: 141 MG/DL (ref 65–99)
GLUCOSE UR STRIP-MCNC: NEGATIVE MG/DL
HBA1C MFR BLD: 9 %
HCT VFR BLD AUTO: 37.3 % (ref 34.8–46.1)
HGB BLD-MCNC: 12 G/DL (ref 11.5–15.4)
HGB UR QL STRIP.AUTO: NEGATIVE
IGA SERPL-MCNC: 268 MG/DL (ref 66–433)
IMM GRANULOCYTES # BLD AUTO: 0.04 THOUSAND/UL (ref 0–0.2)
IMM GRANULOCYTES NFR BLD AUTO: 1 % (ref 0–2)
KETONES UR STRIP-MCNC: NEGATIVE MG/DL
LEUKOCYTE ESTERASE UR QL STRIP: NEGATIVE
LYMPHOCYTES # BLD AUTO: 2.58 THOUSANDS/ÂΜL (ref 0.6–4.47)
LYMPHOCYTES NFR BLD AUTO: 38 % (ref 14–44)
MCH RBC QN AUTO: 27.5 PG (ref 26.8–34.3)
MCHC RBC AUTO-ENTMCNC: 32.2 G/DL (ref 31.4–37.4)
MCV RBC AUTO: 86 FL (ref 82–98)
MONOCYTES # BLD AUTO: 0.65 THOUSAND/ÂΜL (ref 0.17–1.22)
MONOCYTES NFR BLD AUTO: 10 % (ref 4–12)
NEUTROPHILS # BLD AUTO: 3.27 THOUSANDS/ÂΜL (ref 1.85–7.62)
NEUTS SEG NFR BLD AUTO: 48 % (ref 43–75)
NITRITE UR QL STRIP: NEGATIVE
NON-SQ EPI CELLS URNS QL MICRO: NORMAL /HPF
NRBC BLD AUTO-RTO: 0 /100 WBCS
PH UR STRIP.AUTO: 6.5 [PH]
PLATELET # BLD AUTO: 229 THOUSANDS/UL (ref 149–390)
PMV BLD AUTO: 11.9 FL (ref 8.9–12.7)
POTASSIUM SERPL-SCNC: 3.3 MMOL/L (ref 3.5–5.3)
PROT SERPL-MCNC: 7.6 G/DL (ref 6.4–8.4)
PROT UR STRIP-MCNC: NEGATIVE MG/DL
PROT UR-MCNC: 5.9 MG/DL
PROT/CREAT UR: 0.1 MG/G{CREAT} (ref 0–0.1)
RBC # BLD AUTO: 4.36 MILLION/UL (ref 3.81–5.12)
RBC #/AREA URNS AUTO: NORMAL /HPF
SODIUM SERPL-SCNC: 138 MMOL/L (ref 135–147)
SP GR UR STRIP.AUTO: 1.02 (ref 1–1.03)
UROBILINOGEN UR STRIP-ACNC: <2 MG/DL
WBC # BLD AUTO: 6.74 THOUSAND/UL (ref 4.31–10.16)
WBC #/AREA URNS AUTO: NORMAL /HPF

## 2024-12-04 PROCEDURE — 80053 COMPREHEN METABOLIC PANEL: CPT

## 2024-12-04 PROCEDURE — 82570 ASSAY OF URINE CREATININE: CPT

## 2024-12-04 PROCEDURE — 86140 C-REACTIVE PROTEIN: CPT

## 2024-12-04 PROCEDURE — 82784 ASSAY IGA/IGD/IGG/IGM EACH: CPT

## 2024-12-04 PROCEDURE — 85652 RBC SED RATE AUTOMATED: CPT

## 2024-12-04 PROCEDURE — 82785 ASSAY OF IGE: CPT

## 2024-12-04 PROCEDURE — 99213 OFFICE O/P EST LOW 20 MIN: CPT | Performed by: ORTHOPAEDIC SURGERY

## 2024-12-04 PROCEDURE — 86225 DNA ANTIBODY NATIVE: CPT

## 2024-12-04 PROCEDURE — 83036 HEMOGLOBIN GLYCOSYLATED A1C: CPT

## 2024-12-04 PROCEDURE — 84156 ASSAY OF PROTEIN URINE: CPT

## 2024-12-04 PROCEDURE — 86258 DGP ANTIBODY EACH IG CLASS: CPT

## 2024-12-04 PROCEDURE — 86364 TISS TRNSGLTMNASE EA IG CLAS: CPT

## 2024-12-04 PROCEDURE — 86480 TB TEST CELL IMMUN MEASURE: CPT

## 2024-12-04 PROCEDURE — 36415 COLL VENOUS BLD VENIPUNCTURE: CPT

## 2024-12-04 PROCEDURE — 86003 ALLG SPEC IGE CRUDE XTRC EA: CPT

## 2024-12-04 PROCEDURE — 81001 URINALYSIS AUTO W/SCOPE: CPT

## 2024-12-04 PROCEDURE — 73502 X-RAY EXAM HIP UNI 2-3 VIEWS: CPT

## 2024-12-04 PROCEDURE — 85025 COMPLETE CBC W/AUTO DIFF WBC: CPT

## 2024-12-04 PROCEDURE — 86160 COMPLEMENT ANTIGEN: CPT

## 2024-12-04 RX ORDER — ATOGEPANT 10 MG/1
TABLET ORAL
Qty: 90 TABLET | Refills: 0 | Status: SHIPPED | OUTPATIENT
Start: 2024-12-04

## 2024-12-04 NOTE — TELEPHONE ENCOUNTER
Pt made aware of below.  She would be agreeable to trying qulipta  Please send to pharm on file

## 2024-12-04 NOTE — PROGRESS NOTES
Orthopaedic Surgery - Office Note  Jenny Rodrigues (29 y.o. 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   She is schedule for her blood work coming up for her lupus, it is possible she is just experiencing a flare up and needs her medications adjusted   If her blood work is normal and her pain continues, we can consider a hip arthroscopy   If her blood work appears worse, she needs to follow up with her Rheumatologist for further care  Continue with outpatient PT as tolerated  Activity as tolerated  Ordered and reviewed XR during the visit  Reviewed recent PT notes   Reviewed images during the visit   Follow-up:  Return in about 6 weeks (around 1/15/2025) for follow up with Dr. Treviño .      Chief Complaint / Date of Onset  Right hip pain, began in August with no inciting event   Injury Mechanism / Date  None  Surgery / Date  None       History of Present Illness   Jenny Rodrigues is a 29 y.o. female who presents for follow right hip pain. Since her prior visit, she got a lidocaine injection in the hip and notes about 80% relief in symptoms for about 24 hours. She has also been attending PT which is offering only mild relief, she does feel that after her sessions her pain is significantly worse. She is not able to do much the day after PT due to pain. She states her pain today is largely unchanged. She continues to describe her pain as being constant and worsening with movement of the hip, prolonged WB activity and sitting. She has a severe increase in pain with steps. She does have a snapping/popping sensation in her hip with rotation of her hip. She does not have any radiating symptoms or symptoms in her back.     Patient does have Lupus and type 1 diabetic     Treatment  "Summary  Medications / Modalities  Aleve and Tylenol prn with mild temporary relief   Bracing / Immobilization  None  Physical Therapy  Began on 10/29 and continuing to attend   Injections  11/05/2024 lidocaine injection with about 80% relief for 24 hours  Prior Surgeries  None  Other Treatments  Chinese medicine with no relief       Employment / Current Status  Works from home     Sport / Organization / Current Status  N.A      Review of Systems  Pertinent items are noted in HPI.  All other systems were reviewed and are negative.      Physical Exam  Ht 5' 1\" (1.549 m)   Wt 63 kg (139 lb)   BMI 26.26 kg/m²   Cons: Appears well.  No apparent distress.  Psych: Alert. Oriented x3.  Mood and affect normal.  Eyes: PERRLA, EOMI  Resp: Normal effort.  No audible wheezing or stridor.  CV: Palpable pulse.  No discernable arrhythmia.  No LE edema.  Lymph:  No palpable cervical, axillary, or inguinal lymphadenopathy.  Skin: Warm.  No palpable masses.  No visible lesions.  Neuro: Normal muscle tone.  Normal and symmetric DTR's.     Right Hip Exam  Alignment / Posture:  Normal resting hip posture.  Inspection:  No swelling. No ecchymosis.  Palpation:   mild groin tenderness. No effusion.  ROM:  Hip Flexion 100. Hip ER 60. Hip IR 30.  Strength:  Hip Flexors 4-/5. Hip Abductors 4-/5.  Stability:  (+) Logroll.  Tests:  (+) Stinchfield.  Neurovascular:  Sensation intact in DP/SP/Carrillo/Sa/T nerve distributions. 2+ DP & PT pulses.  Gait:  Antalgic.       Studies Reviewed  I have personally reviewed pertinent films in PACS.  MRI of right hip- images from 10/03/2024 showing CAM deformity and herniation pit  XR of right hip- images from 08/29/2024 no abnormalities or fractures   CT of right hip- 09/04/2024 no concern for AVN, cyst seen in femoral head which is not concerning   MRI of right femur- 09/11/2024 this study was not useful for patients symptoms       Procedures  No procedures today.    Medical, Surgical, Family, and Social " History  The patient's medical history, family history, and social history, were reviewed and updated as appropriate.    Past Medical History:   Diagnosis Date    Abdominal pain     Anaphylaxis     Anxiety     Anxiety and depression     COVID-19 12/2020    Delayed emergence from anesthesia 03/23/2023    Severe episode of emergence delirium (confused, combative) after MAC anesthetic on 03/2023 for EGD. Received versed 2mg, >50mcg precedex, 5mg IV haldol, and 50mcg fentanyl. Waxing and waning episodes of agitation. Any future anesthetics should NOT be done at [facility].    Delirium, induced by drug     anesthesia    Depression     Diabetes mellitus (HCC)     Disease of thyroid gland     Hashimoto    DKA, type 1 (HCC) 05/11/2021    Eating disorder     history of anorexia/bulemia [id number]    Food intolerance     Fracture of fibula     R Salter I    Gilbert disease     Hashimoto's disease 02/21/2020    Head injury     Headache(784.0)     Nasal congestion     PTSD (post-traumatic stress disorder)     Rectal bleed     Seizures (HCC)     Type 1 diabetes (HCC)        Past Surgical History:   Procedure Laterality Date    COLONOSCOPY      EGD  03/23/2023    KNEE SURGERY Right 07/06/2020    NASAL SEPTOPLASTY W/ TURBINOPLASTY      SINUS SURGERY      SKIN BIOPSY      TURBINOPLASTY N/A 03/22/2021    Procedure: TURBINOPLASTY;  Surgeon: Jn Hidalgo MD;  Location: [facility];  Service: ENT    UPPER GASTROINTESTINAL ENDOSCOPY      WISDOM TOOTH EXTRACTION      WRIST SURGERY      left; Excision of ganglion       Family History   Problem Relation Age of Onset    Hypertension Mother     Migraines Mother         Headache    Diabetes type II Mother     Varicose Veins Mother     Hyperlipidemia Mother     Diabetes Mother     Arthritis Mother     Depression Mother     Hearing loss Mother     Anxiety disorder Mother     Eczema Father     Cholelithiasis Father     Hypertension Father     Sarcoidosis Father         Liver    Hyperlipidemia  "Father     Diabetes Father     Coronary artery disease Father     Nephrolithiasis Father     Cirrhosis Father     Alcohol abuse Father     Thyroid disease Sister     Hashimoto's thyroiditis Sister     Alcohol abuse Brother     Cancer Family     Diabetes Family     Hypertension Family        Social History     Occupational History    Occupation: unemployed   Tobacco Use    Smoking status: Never     Passive exposure: Never    Smokeless tobacco: Never    Tobacco comments:     Tobacco smoke exposure (Father smokes cigars)   Vaping Use    Vaping status: Never Used   Substance and Sexual Activity    Alcohol use: Not Currently     Comment: rarely    Drug use: Never    Sexual activity: Yes     Partners: Male     Birth control/protection: Condom Male       Allergies   Allergen Reactions    Shellfish-Derived Products - Food Allergy Anaphylaxis     Octopus, sea snails, claims, etc (ask patient)    Insulin Glargine Other (See Comments)     LANTUS BRAND -Burning and redness under skin          Current Outpatient Medications:     acetaminophen (TYLENOL) 325 mg tablet, Take 3 tablets (975 mg total) by mouth every 8 (eight) hours as needed for mild pain or headaches, Disp: , Rfl:     Atogepant (Qulipta) 10 MG TABS, 1 tab daily., Disp: 90 tablet, Rfl: 0    BD Insulin Syringe U/F 31G X 5/16\" 0.5 ML MISC, Use as directly with weekly methotrexate injection., Disp: 100 each, Rfl: 0    Belimumab (BENLYSTA IV), Inject into a catheter in a vein every month to absorb continually Switching to monthly on 12/5, Disp: , Rfl:     clindamycin (CLEOCIN T) 1 % lotion, Apply 1 application. topically 2 (two) times a day, Disp: , Rfl:     Cyanocobalamin 1000 MCG SUBL, Place 1 tablet (1,000 mcg total) under the tongue daily, Disp: 90 tablet, Rfl: 0    famotidine (PEPCID) 40 MG tablet, Take 1 tablet (40 mg total) by mouth daily at bedtime, Disp: 90 tablet, Rfl: 3    folic acid (FOLVITE) 1 mg tablet, TAKE 1 TABLET BY MOUTH EVERY DAY, Disp: 90 tablet, " Rfl: 3    gabapentin (Neurontin) 600 MG tablet, Take 1 tablet (600 mg total) by mouth daily, Disp: 90 tablet, Rfl: 1    Glucagon (Gvoke HypoPen 2-Pack) 1 MG/0.2ML SOAJ, 1 mg PRN for severe hypoglycemia, Disp: 0.4 mL, Rfl: 0    Glucagon HCl (Glucagon Emergency) 1 MG/ML SOLR, , Disp: , Rfl:     ketorolac (TORADOL) 10 mg tablet, Take 1 tablet (10 mg total) by mouth every 6 (six) hours as needed (migraine) Max 2-3 per week., Disp: 10 tablet, Rfl: 0    levothyroxine 25 mcg tablet, Take 1 tablet (25 mcg total) by mouth daily, Disp: 60 tablet, Rfl: 0    LORazepam (ATIVAN PO), Take by mouth daily as needed, Disp: , Rfl:     metFORMIN (GLUCOPHAGE-XR) 500 mg 24 hr tablet, Take 2 tablets (1,000 mg total) by mouth 2 (two) times a day with meals, Disp: 360 tablet, Rfl: 2    methotrexate 50 MG/2ML injection, Inject 0.8 mL (20 mg total) under the skin once a week, Disp: 8 mL, Rfl: 4    Naproxen Sodium (ALEVE PO), Take by mouth daily as needed, Disp: , Rfl:     NovoLOG 100 UNIT/ML injection, Up to 100 units per day with insulin pump, Disp: 90 mL, Rfl: 3    pantoprazole (PROTONIX) 40 mg tablet, TAKE 1 TABLET BY MOUTH EVERY DAY, Disp: 90 tablet, Rfl: 1    rizatriptan (MAXALT-MLT) 10 mg disintegrating tablet, 1 tab at migraine onset, repeat after 2 hours if needed; Max 2 per day and 3 per week., Disp: 9 tablet, Rfl: 2    sucralfate (CARAFATE) 1 g tablet, Take 1 tablet (1 g total) by mouth 4 (four) times a day, Disp: 40 tablet, Rfl: 0    Tresiba FlexTouch 100 units/mL injection pen, Inject 32 Units under the skin daily, Disp: 15 mL, Rfl: 2    EPINEPHrine (EPIPEN) 0.3 mg/0.3 mL SOAJ, Inject 0.3 mL (0.3 mg total) into a muscle once for 1 dose, Disp: 0.6 mL, Rfl: 0    Galcanezumab-gnlm (Emgality) 120 MG/ML SOAJ, One ml subcutaneous on the right thigh and 1 ml subcutaneous on the left thigh at the same time for 1 dose (Patient not taking: Reported on 10/14/2024), Disp: 2 mL, Rfl: 0    Galcanezumab-gnlm (Emgality) 120 MG/ML SOAJ, 30 days  after loading dose, inject 1 pen subq every 30 days. (Patient not taking: Reported on 10/14/2024), Disp: 1 mL, Rfl: 11    hydroxychloroquine (PLAQUENIL) 200 mg tablet, Take 1 tablet (200 mg total) by mouth 2 (two) times a day with meals, Disp: 180 tablet, Rfl: 3    Insulin Pen Needle (BD PEN NEEDLE NASIM U/F) 32G X 4 MM MISC, by Does not apply route 4 (four) times a day for 180 days, Disp: 400 each, Rfl: 3    levonorgestrel-ethinyl estradiol (Altavera) 0.15-30 MG-MCG per tablet, TAKE 1 TABLET BY MOUTH EVERY DAY (Patient not taking: Reported on 10/14/2024), Disp: 84 tablet, Rfl: 0      Maida Blackwood    Scribe Attestation      I,:  Maida Blackwood am acting as a scribe while in the presence of the attending physician.:       I,:  Dell Treviño MD personally performed the services described in this documentation    as scribed in my presence.:

## 2024-12-05 ENCOUNTER — RESULTS FOLLOW-UP (OUTPATIENT)
Dept: ENDOCRINOLOGY | Facility: CLINIC | Age: 29
End: 2024-12-05

## 2024-12-05 ENCOUNTER — RESULTS FOLLOW-UP (OUTPATIENT)
Dept: GASTROENTEROLOGY | Facility: CLINIC | Age: 29
End: 2024-12-05

## 2024-12-05 LAB
ALMOND IGE QN: <0.1 KUA/I (ref 0–0.1)
CASHEW NUT IGE QN: <0.1 KUA/I (ref 0–0.1)
CODFISH IGE QN: <0.1 KUA/I (ref 0–0.1)
EGG WHITE IGE QN: <0.1 KUA/I (ref 0–0.1)
GAMMA INTERFERON BACKGROUND BLD IA-ACNC: 0.01 IU/ML
GLIADIN PEPTIDE IGA SER-ACNC: 0.2 U/ML
GLIADIN PEPTIDE IGA SER-ACNC: NEGATIVE
GLIADIN PEPTIDE IGG SER-ACNC: <0.4 U/ML
GLIADIN PEPTIDE IGG SER-ACNC: NEGATIVE
GLUTEN IGE QN: <0.1 KUA/I (ref 0–0.1)
HAZELNUT IGE QN: <0.1 KUA/L (ref 0–0.1)
M TB IFN-G BLD-IMP: NEGATIVE
M TB IFN-G CD4+ BCKGRND COR BLD-ACNC: 0.02 IU/ML
M TB IFN-G CD4+ BCKGRND COR BLD-ACNC: 0.02 IU/ML
MILK IGE QN: <0.1 KUA/I (ref 0–0.1)
MITOGEN IGNF BCKGRD COR BLD-ACNC: 9.99 IU/ML
PEANUT IGE QN: <0.1 KUA/I (ref 0–0.1)
SALMON IGE QN: <0.1 KUA/I (ref 0–0.1)
SCALLOP IGE QN: <0.1 KUA/L (ref 0–0.1)
SESAME SEED IGE QN: <0.1 KUA/I (ref 0–0.1)
SHRIMP IGE QN: <0.1 KUA/L (ref 0–0.1)
SOYBEAN IGE QN: <0.1 KUA/I (ref 0–0.1)
TOTAL IGE SMQN RAST: 22.2 KU/L (ref 0–113)
TTG IGA SER-ACNC: <0.5 U/ML
TTG IGA SER-ACNC: NEGATIVE
TTG IGG SER-ACNC: <0.8 U/ML
TTG IGG SER-ACNC: NEGATIVE
TUNA IGE QN: <0.1 KUA/I (ref 0–0.1)
WALNUT IGE QN: <0.1 KUA/I (ref 0–0.1)
WHEAT IGE QN: <0.1 KUA/I (ref 0–0.1)

## 2024-12-06 DIAGNOSIS — E10.65 TYPE 1 DIABETES MELLITUS WITH HYPERGLYCEMIA (HCC): Primary | ICD-10-CM

## 2024-12-06 LAB — DSDNA AB SER-ACNC: <1 IU/ML (ref 0–9)

## 2024-12-09 ENCOUNTER — TELEPHONE (OUTPATIENT)
Age: 29
End: 2024-12-09

## 2024-12-09 DIAGNOSIS — M35.9 UNDIFFERENTIATED CONNECTIVE TISSUE DISEASE (HCC): ICD-10-CM

## 2024-12-09 NOTE — TELEPHONE ENCOUNTER
Patient called in to schedule an appointment for pump training from a referral. Patient also has a few questions. Please call patient back.

## 2024-12-10 RX ORDER — METHOTREXATE 25 MG/ML
INJECTION, SOLUTION INTRAMUSCULAR; INTRATHECAL; INTRAVENOUS; SUBCUTANEOUS
Qty: 10 ML | Refills: 3 | Status: SHIPPED | OUTPATIENT
Start: 2024-12-10

## 2024-12-11 ENCOUNTER — OFFICE VISIT (OUTPATIENT)
Dept: PHYSICAL THERAPY | Facility: REHABILITATION | Age: 29
End: 2024-12-11
Payer: COMMERCIAL

## 2024-12-11 ENCOUNTER — TELEPHONE (OUTPATIENT)
Age: 29
End: 2024-12-11

## 2024-12-11 DIAGNOSIS — M25.551 PAIN OF RIGHT HIP: ICD-10-CM

## 2024-12-11 DIAGNOSIS — M25.851 FEMOROACETABULAR IMPINGEMENT OF RIGHT HIP: Primary | ICD-10-CM

## 2024-12-11 PROCEDURE — 97116 GAIT TRAINING THERAPY: CPT

## 2024-12-11 PROCEDURE — 97112 NEUROMUSCULAR REEDUCATION: CPT

## 2024-12-11 NOTE — PROGRESS NOTES
Daily Note     Today's date: 2024  Patient name: Jenny Rodrigues  : 1995  MRN: [id number]  Referring provider: Dell Treviño, *  Dx:   Encounter Diagnosis     ICD-10-CM    1. Femoroacetabular impingement of right hip  M25.851       2. Pain of right hip  M25.551             Start Time: 0800  Stop Time: 0845  Total time in clinic (min): 45 minutes      Subjective: Patient's status remains relatively the same. She does feel like she is getting stronger. Is going in for more blood work for additional inflammatory markers due to PMH. Potentially surgical candidate for exploratory or joint clean out. Has been forcing herself to walk as normal as possible.       Objective: See treatment diary below      Assessment: Patient tolerated a little more this session. She responded well to gait training via AlterG at 50-60%, was able to maintain less than 3/10 pain. Continue to work on graded exposure and hip strengthening. Patient will continue to be appropriate for skilled outpatient physical therapy in order to address impairments. 1:1 with Carmela Newton, PT, DPT for entirety of treatment session.        Plan: Continue per plan of care.      Precautions: Type 1 DM, Idiopathic Hypotension Psychosocial Disorders, Lupus, Hashimoto's Disease, Hx of Seizures, Fibromyalgia, Chronic Pain    POC expires Unit limit Auth Expiration date PT/OT + Visit Limit?   24 BOMN 10/21/25 BOMN  60 PCY         Visit/Unit Tracking  AUTH Status:  Date 10/29 11/6 11/13 11/20 12/11    60 PCY Used 1 2 3 4 5     Remaining  59 58 57 56 55       Pertinent Findings:                                                                                        Test / Measure  10/29/24   FOTO (Predicted 38) 65   R Hip 3/5 P!   R Hip AROM Unable in supine   significant pain with + hip adductors and proximal hip flexor P!, no change with soft tissue palpation along TFL      Access Code: [email or web address]  Access Code: [email or web address]  URL:  [email or web address]  Date: 11/06/2024  Prepared by: Carmela Newton    Exercises  - Supine Quad Set  - 1 x daily - 7 x weekly - 2 sets - 10 reps - 5-10 second hold  - Active Straight Leg Raise with Quad Set  - 1 x daily - 7 x weekly - 2 sets - 5-8 reps  - Supine Isometric Hamstring Set  - 1 x daily - 7 x weekly - 1-2 sets - 10 reps - 5 seconds hold  - Lying Straight Leg Resisted Hip External Rotation  - 1 x daily - 7 x weekly - 2 sets - 10 reps - 2-3 seconds hold  - Long Sitting Isometric Hip Adduction and Extension with Ball at Knees  - 1 x daily - 7 x weekly - 1 sets - 10 reps - 5\" hold    Manuals 10/29 11/6  11/13  11/20  12/11             R Hip PROM   AR   AR  AR  B/L  AR             Gentle Hip Femoral Distraction   oscillations   AR  AR               Hip Abduction AAROM with therapist as assist  AR  AR  AR               Neuro Re-Ed                     HEP/Patient Education/Modalities AR 38'                    Alt SAQ with bolster    2x8  B/L         Hooklying TrA Iso Ball Presses   Green ball  5\"x20  Green ball  5\"x20  Green ball  5\"x20  Green ball  5\"x20             LTR legs on ball    15x 20x        Supine Hip Adductor  To pillow  5\"x20  To pillow  5\"x20  to pillow  5\"x20  HEP              Hamstring Set Iso  LLE bent  5\"x10    RLE straight  5\"x10  LE bent  5\"x10    RLE straigh  5\"x10  LE bent  5\"x10    RLE  Straight  5\"x10  HEP             Bridges off pball     Modified  10x        Supine Hip ER (resistance at forefoot)    YTB  20x YTB  20x  np                                        Ther Ex                       NuStep  Seat: 8  Arm: 10   8' L3  6' L2 defer               Hip SLR   + Strap  LLE  2x6  +strap  LLE  2x6    Strap + PT assist  5x  NP               Bridge                      SL Clamshells                       Standing Hip Abd/Ext                                                                                               Ther Activity                                                    "                    Gait Training                       AlterG:  Pants: Red/L  Height: 9          0.5mph    50% @ 4'  P: 2/10    60% @  P: 2/10  6'                                                 Modalities

## 2024-12-13 ENCOUNTER — HOSPITAL ENCOUNTER (OUTPATIENT)
Dept: INFUSION CENTER | Facility: CLINIC | Age: 29
End: 2024-12-13
Payer: COMMERCIAL

## 2024-12-13 ENCOUNTER — TELEPHONE (OUTPATIENT)
Dept: FAMILY MEDICINE CLINIC | Facility: CLINIC | Age: 29
End: 2024-12-13

## 2024-12-13 VITALS — WEIGHT: 147.5 LBS | BODY MASS INDEX: 27.87 KG/M2

## 2024-12-13 DIAGNOSIS — M32.8 OTHER FORMS OF SYSTEMIC LUPUS ERYTHEMATOSUS, UNSPECIFIED ORGAN INVOLVEMENT STATUS (HCC): Primary | ICD-10-CM

## 2024-12-13 PROCEDURE — 96413 CHEMO IV INFUSION 1 HR: CPT

## 2024-12-13 RX ORDER — ACETAMINOPHEN 325 MG/1
650 TABLET ORAL ONCE
OUTPATIENT
Start: 2025-01-10

## 2024-12-13 RX ORDER — DIPHENHYDRAMINE HCL 25 MG
25 TABLET ORAL ONCE
OUTPATIENT
Start: 2025-01-10

## 2024-12-13 RX ORDER — DIPHENHYDRAMINE HCL 25 MG
25 TABLET ORAL ONCE
Status: COMPLETED | OUTPATIENT
Start: 2024-12-13 | End: 2024-12-13

## 2024-12-13 RX ORDER — SODIUM CHLORIDE 9 MG/ML
20 INJECTION, SOLUTION INTRAVENOUS ONCE
Status: COMPLETED | OUTPATIENT
Start: 2024-12-13 | End: 2024-12-13

## 2024-12-13 RX ORDER — ACETAMINOPHEN 325 MG/1
650 TABLET ORAL ONCE
Status: COMPLETED | OUTPATIENT
Start: 2024-12-13 | End: 2024-12-13

## 2024-12-13 RX ORDER — SODIUM CHLORIDE 9 MG/ML
20 INJECTION, SOLUTION INTRAVENOUS ONCE
OUTPATIENT
Start: 2025-01-10

## 2024-12-13 RX ADMIN — SODIUM CHLORIDE 20 ML/HR: 0.9 INJECTION, SOLUTION INTRAVENOUS at 13:40

## 2024-12-13 RX ADMIN — DIPHENHYDRAMINE HYDROCHLORIDE 25 MG: 25 TABLET ORAL at 13:43

## 2024-12-13 RX ADMIN — ACETAMINOPHEN 650 MG: 325 TABLET ORAL at 13:43

## 2024-12-13 RX ADMIN — BELIMUMAB 640 MG: 400 INJECTION, POWDER, LYOPHILIZED, FOR SOLUTION INTRAVENOUS at 14:42

## 2024-12-13 NOTE — PROGRESS NOTES
Tolerated infusion without incident: No adverse reactions noted: Verified follow up appt with patient ( 01/10/25 ): AVS offered and declined

## 2024-12-13 NOTE — TELEPHONE ENCOUNTER
Sent Jodange message for pt to call office to reschedule 12/19 appt as pcp is out on emergency leave. Please do not schedule in a held day

## 2024-12-13 NOTE — PROGRESS NOTES
Patient is here for [facility]. Patient offers no complaints at this time, will continue to monitor.

## 2024-12-16 ENCOUNTER — TELEPHONE (OUTPATIENT)
Dept: NEUROLOGY | Facility: CLINIC | Age: 29
End: 2024-12-16

## 2024-12-18 ENCOUNTER — OFFICE VISIT (OUTPATIENT)
Dept: DIABETES SERVICES | Facility: CLINIC | Age: 29
End: 2024-12-18
Payer: COMMERCIAL

## 2024-12-18 ENCOUNTER — APPOINTMENT (OUTPATIENT)
Dept: PHYSICAL THERAPY | Facility: REHABILITATION | Age: 29
End: 2024-12-18
Payer: COMMERCIAL

## 2024-12-18 DIAGNOSIS — E10.65 TYPE 1 DIABETES MELLITUS WITH HYPERGLYCEMIA (HCC): Primary | ICD-10-CM

## 2024-12-18 PROCEDURE — 98960 EDU&TRN PT SELF-MGMT NQHP 1: CPT | Performed by: DIETITIAN, REGISTERED

## 2024-12-18 NOTE — PROGRESS NOTES
"Pump Assessment    HPI: Met with Jenny Rodrigues for DSME Initial visit.  Here today for initial insulin pump assessment. Patient is potentially interested in a pump.  Today we reviewed the basics of insulin pump therapy concepts.  We reviewed and looked at two insulin pumps- Medtronic and Beta Bionics pumps. Reviewed pros and cons of each, special features, automation, CGM connectivity, etc.  Jenny Rodrigues has a good understanding of the pump options available and can make informed decision that is best for them. At this time, Jenny Rodrigues is leaning towards the Medtronic 780G insulin pump. She would like more time to look further into the Beta Bionics pump before she makes a decision.     Patient states that she had carbohydrate counting and flexible insulin eructation prior to going on the [place].    They will call with questions or for more education, and will contact us when their pump arrives to schedule training with our education department.       Ht Readings from Last 1 Encounters:   12/05/24 5' 1\" (1.549 m)     Wt Readings from Last 3 Encounters:   12/13/24 66.9 kg (147 lb 8 oz)   12/05/24 63 kg (139 lb)   12/04/24 63 kg (139 lb)        There is no height or weight on file to calculate BMI.     Lab Results   Component Value Date    HGBA1C 9.0 (H) 12/04/2024    HGBA1C 7.8 (H) 09/04/2024    HGBA1C 10.5 (A) 06/07/2024       No results found for: \"CHOL\"  Lab Results   Component Value Date    HDL 73 09/04/2024    HDL 51 02/02/2023    HDL 53 01/03/2022     Lab Results   Component Value Date    LDLCALC 132 (H) 09/04/2024    LDLCALC 135 (H) 02/02/2023    LDLCALC 128 (H) 01/03/2022     Lab Results   Component Value Date    TRIG 95 09/04/2024    TRIG 72 02/02/2023    TRIG 88 01/03/2022     No results found for: \"CHOLHDL\"  No results found for: \"MICROALBUR\", \"[id number]\"    Diabetes Education Record  Jenny received the following handouts: Literature from the various pump companies.      Patient response to " instruction    Comprehensiongood  Motivationgood  Expected Compliancegood    Start- Stop: 9:40-10:40  Total Minutes: 60 Minutes  Group or Individual Instruction: DSMT-I  Other: MARGARET Wallace    Thank you for referring your patient to [facility], it was a pleasure working with them today. Please feel free to call with any questions or concerns.    Rex Allen  [address] [id number]

## 2024-12-18 NOTE — TELEPHONE ENCOUNTER
Per CMM, [employer]-  Your PA request has been approved.      Called [facility] pharmacy and left a message on their answering machine making them aware of the approval and for a call back if any questions.

## 2024-12-23 ENCOUNTER — OFFICE VISIT (OUTPATIENT)
Dept: ENDOCRINOLOGY | Facility: CLINIC | Age: 29
End: 2024-12-23
Payer: COMMERCIAL

## 2024-12-23 VITALS
DIASTOLIC BLOOD PRESSURE: 80 MMHG | WEIGHT: 147.2 LBS | SYSTOLIC BLOOD PRESSURE: 120 MMHG | HEIGHT: 62 IN | BODY MASS INDEX: 27.09 KG/M2

## 2024-12-23 DIAGNOSIS — E10.65 TYPE 1 DIABETES MELLITUS WITH HYPERGLYCEMIA (HCC): ICD-10-CM

## 2024-12-23 DIAGNOSIS — E10.649 UNCONTROLLED TYPE 1 DIABETES MELLITUS WITH HYPOGLYCEMIA WITHOUT COMA (HCC): ICD-10-CM

## 2024-12-23 DIAGNOSIS — E88.819 INSULIN RESISTANCE: ICD-10-CM

## 2024-12-23 DIAGNOSIS — Z79.631 METHOTREXATE, LONG TERM, CURRENT USE: ICD-10-CM

## 2024-12-23 DIAGNOSIS — E06.3 HASHIMOTO'S DISEASE: ICD-10-CM

## 2024-12-23 PROCEDURE — 99214 OFFICE O/P EST MOD 30 MIN: CPT | Performed by: INTERNAL MEDICINE

## 2024-12-23 RX ORDER — INSULIN ASPART 100 [IU]/ML
INJECTION, SOLUTION INTRAVENOUS; SUBCUTANEOUS
Qty: 90 ML | Refills: 3 | Status: SHIPPED | OUTPATIENT
Start: 2024-12-23

## 2024-12-23 RX ORDER — INSULIN DEGLUDEC 100 U/ML
32 INJECTION, SOLUTION SUBCUTANEOUS DAILY
Qty: 15 ML | Refills: 2 | Status: SHIPPED | OUTPATIENT
Start: 2024-12-23

## 2024-12-23 RX ORDER — PEN NEEDLE, DIABETIC 29 G X1/2"
NEEDLE, DISPOSABLE MISCELLANEOUS
Qty: 100 EACH | Refills: 0 | Status: SHIPPED | OUTPATIENT
Start: 2024-12-23

## 2024-12-23 RX ORDER — METFORMIN HYDROCHLORIDE 500 MG/1
1000 TABLET, EXTENDED RELEASE ORAL 2 TIMES DAILY WITH MEALS
Qty: 360 TABLET | Refills: 2 | Status: SHIPPED | OUTPATIENT
Start: 2024-12-23

## 2024-12-23 RX ORDER — LEVOTHYROXINE SODIUM 25 UG/1
25 TABLET ORAL DAILY
Qty: 90 TABLET | Refills: 3 | Status: SHIPPED | OUTPATIENT
Start: 2024-12-23

## 2024-12-23 RX ORDER — SUBCUTANEOUS INSULIN PUMP
EACH MISCELLANEOUS
COMMUNITY

## 2024-12-23 NOTE — PROGRESS NOTES
"12/23/2024    Assessment & Plan      Diagnoses and all orders for this visit:    Uncontrolled type 1 diabetes mellitus with hypoglycemia without coma (HCC)  -     NovoLOG 100 UNIT/ML injection; Up to 100 units per day with insulin pump    Type 1 diabetes mellitus with hyperglycemia (HCC)  -     Tresiba FlexTouch 100 units/mL injection pen; Inject 32 Units under the skin daily  -     Hemoglobin A1C; Future  -     Comprehensive metabolic panel; Future  -     Lipid Panel with Direct LDL reflex; Future  -     TSH, 3rd generation; Future    Insulin resistance  -     metFORMIN (GLUCOPHAGE-XR) 500 mg 24 hr tablet; Take 2 tablets (1,000 mg total) by mouth 2 (two) times a day with meals    Hashimoto's disease  -     levothyroxine 25 mcg tablet; Take 1 tablet (25 mcg total) by mouth daily    Methotrexate, long term, current use  -     BD Insulin Syringe U/F 31G X 5/16\" 0.5 ML MISC; Use as directly with weekly methotrexate injection.    Other orders  -     Insulin Infusion Pump (T:slim X2 Ins Pump/Control-IQ) ANURAG; Use        Assessment/Plan:  Type 1 diabetes with insulin pump status: She has recently met with diabetes education to review other pump options.  She would like to stay on tandem pump for now.  She is aware this is out of warrantee and does have Tresiba and NovoLog as backup plan.  She will consider upgrading to a new tandem pump in the future.  She will let me know any updates or questions in the meantime.  We made an adjustment to her 2 PM basal rate to decrease it to 0.5 given hypoglycemia in the afternoon and early evening.  She may need adjustment in carb ratios but for now we started with this change.  On average, most recent glucose values do appear better than recent A1c.  Will arrange for follow-up in 3 months and labs have been ordered to be done prior to that appointment.    After changes made today insulin pump settings are as follows:  12 AM basal rate 0.7, correction 40, carb ratio 4, target 120  3 AM " basal rate 0.65, correction 40, carb ratio 4, target 120  11 AM basal rate 0.6, correction 40, carb ratio 4, target 100  2 PM basal rate 0.5, correction 40, carb ratio 4, target 100    Hypothyroidism: Continue levothyroxine and update thyroid labs prior to next appointment.      CC: Diabetes follow-up    History of Present Illness     HPI: Jenny Rodrigues is a 29 y.o. year old female with type 1 diabetes who presents for a follow up appointment.  She is on insulin at home and takes metforminx XR 1000 mg bid, but more recently has been on tamdem t slim X2 pump with dexom g7 in control iq. She denies any polyuria, polydipsia, nocturia and blurry vision.  No known microvascular complications.  Last DKA In May.    Backup plan: Has tresiba for basal and novolog for bolus.    Did see diabetes education for pump assessment on 12/18/24.    Hypoglycemic episodes: Yes typically later in the afternoon.  Keep fast acting carbohydrates nearby at all times. She does have glucagon in case of severe hypoglycemia.    The patient's last eye exam was in May 2024 with no DR.  The patient's last foot exam was in April 2024.    Blood Sugar/Glucometer/Pump/CGM review: Insulin pump download from 12/10 through 12/23 shows an average sensor glucose of 164, time within programmed to 66%, 3.2% of values below and 0.3% very low, standard ideation 71, blood sugars are most stable overnight and in the early morning.  Most frequent pattern of hypoglycemia is typically later in the afternoon and early evening.  She does note she has been doing more walking around 2 PM.    Hypothyroidism: Levothyroxine 25 mcg daily.  Takes this correctly.    Review of Systems   Constitutional:  Negative for fatigue.   HENT:  Negative for trouble swallowing and voice change.    Eyes:  Negative for visual disturbance.   Respiratory:  Negative for shortness of breath.    Cardiovascular:  Negative for palpitations and leg swelling.   Gastrointestinal:  Negative for  abdominal pain, nausea and vomiting.   Endocrine: Negative for polydipsia and polyuria.   Musculoskeletal:  Negative for arthralgias and myalgias.   Skin:  Negative for rash.   Neurological:  Negative for dizziness, tremors and weakness.   Hematological:  Negative for adenopathy.   Psychiatric/Behavioral:  Negative for agitation and confusion.        Historical Information   Past Medical History:   Diagnosis Date    Abdominal pain     Anaphylaxis     Anxiety     Anxiety and depression     COVID-19 12/2020    Delayed emergence from anesthesia 03/23/2023    Severe episode of emergence delirium (confused, combative) after MAC anesthetic on 03/2023 for EGD. Received versed 2mg, >50mcg precedex, 5mg IV haldol, and 50mcg fentanyl. Waxing and waning episodes of agitation. Any future anesthetics should NOT be done at [facility].    Delirium, induced by drug     anesthesia    Depression     Diabetes mellitus (HCC)     Disease of thyroid gland     Hashimoto    DKA, type 1 (HCC) 05/11/2021    Eating disorder     history of anorexia/bulemia 0424-9905    Fatigue     Food intolerance     Fracture of fibula     R Salter I    Gilbert disease     Hashimoto's disease 02/21/2020    Head injury     Headache(784.0)     Migraine     Nasal congestion     PTSD (post-traumatic stress disorder)     Rectal bleed     Seizures (HCC)     Type 1 diabetes (HCC)      Past Surgical History:   Procedure Laterality Date    COLONOSCOPY      EGD  03/23/2023    KNEE SURGERY Right 07/06/2020    NASAL SEPTOPLASTY W/ TURBINOPLASTY      SINUS SURGERY      SKIN BIOPSY      TURBINOPLASTY N/A 03/22/2021    Procedure: TURBINOPLASTY;  Surgeon: Jn Hidalgo MD;  Location: [facility];  Service: ENT    UPPER GASTROINTESTINAL ENDOSCOPY      WISDOM TOOTH EXTRACTION      WRIST SURGERY      left; Excision of ganglion     Social History   Social History     Substance and Sexual Activity   Alcohol Use Not Currently    Comment: rarely     Social History     Substance and  Sexual Activity   Drug Use No     Social History     Tobacco Use   Smoking Status Never    Passive exposure: Never   Smokeless Tobacco Never   Tobacco Comments    Tobacco smoke exposure (Father smokes cigars)     Family History:   Family History   Problem Relation Age of Onset    Hypertension Mother     Migraines Mother         Headache    Diabetes type II Mother     Varicose Veins Mother     Hyperlipidemia Mother     Diabetes Mother     Arthritis Mother     Depression Mother     Hearing loss Mother     Anxiety disorder Mother     Eczema Father     Cholelithiasis Father     Hypertension Father     Sarcoidosis Father         Liver    Hyperlipidemia Father     Diabetes Father     Coronary artery disease Father     Nephrolithiasis Father     Cirrhosis Father     Alcohol abuse Father     Thyroid disease Sister     Hashimoto's thyroiditis Sister     Alcohol abuse Brother     Cancer Family     Diabetes Family     Hypertension Family     Thyroid disease Sister        Meds/Allergies   Current Outpatient Medications   Medication Sig Dispense Refill    acetaminophen (TYLENOL) 325 mg tablet Take 3 tablets (975 mg total) by mouth every 8 (eight) hours as needed for mild pain or headaches      Atogepant (Qulipta) 10 MG TABS 1 tab daily. 90 tablet 0    Belimumab (BENLYSTA IV) Inject into a catheter in a vein every month to absorb continually Switching to monthly on 12/5      clindamycin (CLEOCIN T) 1 % lotion Apply 1 application. topically 2 (two) times a day      Cyanocobalamin 1000 MCG SUBL Place 1 tablet (1,000 mcg total) under the tongue daily 90 tablet 0    famotidine (PEPCID) 40 MG tablet Take 1 tablet (40 mg total) by mouth daily at bedtime 90 tablet 3    fluticasone (FLONASE) 50 mcg/act nasal spray SPRAY 1 SPRAY INTO EACH NOSTRIL EVERY DAY 24 mL 3    folic acid (FOLVITE) 1 mg tablet TAKE 1 TABLET BY MOUTH EVERY DAY 90 tablet 3    gabapentin (Neurontin) 600 MG tablet Take 1 tablet (600 mg total) by mouth daily 90 tablet 1  "   Glucagon (Gvoke HypoPen 2-Pack) 1 MG/0.2ML SOAJ 1 mg PRN for severe hypoglycemia 0.4 mL 0    hydroxychloroquine (PLAQUENIL) 200 mg tablet Take 1 tablet (200 mg total) by mouth 2 (two) times a day with meals 180 tablet 3    Insulin Infusion Pump (T:slim X2 Ins Pump/Control-IQ) ANURAG Use      Insulin Pen Needle (BD PEN NEEDLE NASIM U/F) 32G X 4 MM MISC by Does not apply route 4 (four) times a day for 180 days 400 each 3    ketorolac (TORADOL) 10 mg tablet Take 1 tablet (10 mg total) by mouth every 6 (six) hours as needed (migraine) Max 2-3 per week. 10 tablet 0    LORazepam (ATIVAN PO) Take by mouth daily as needed      methotrexate 50 MG/2ML injection INJECT 0.8 ML UNDER THE SKIN ONCE EVERY 7 DAYS. DISCARD UNUSED REMAINDER 30 DAYS AFTER INITIAL USE 10 mL 3    Naproxen Sodium (ALEVE PO) Take by mouth daily as needed      pantoprazole (PROTONIX) 40 mg tablet TAKE 1 TABLET BY MOUTH EVERY DAY 90 tablet 1    rizatriptan (MAXALT-MLT) 10 mg disintegrating tablet 1 tab at migraine onset, repeat after 2 hours if needed; Max 2 per day and 3 per week. 9 tablet 2    sucralfate (CARAFATE) 1 g tablet Take 1 tablet (1 g total) by mouth 4 (four) times a day 40 tablet 0    BD Insulin Syringe U/F 31G X 5/16\" 0.5 ML MISC Use as directly with weekly methotrexate injection. 100 each 0    EPINEPHrine (EPIPEN) 0.3 mg/0.3 mL SOAJ Inject 0.3 mL (0.3 mg total) into a muscle once for 1 dose 0.6 mL 0    Galcanezumab-gnlm (Emgality) 120 MG/ML SOAJ One ml subcutaneous on the right thigh and 1 ml subcutaneous on the left thigh at the same time for 1 dose (Patient not taking: Reported on 10/14/2024) 2 mL 0    Galcanezumab-gnlm (Emgality) 120 MG/ML SOAJ 30 days after loading dose, inject 1 pen subq every 30 days. (Patient not taking: Reported on 10/14/2024) 1 mL 11    Glucagon HCl (Glucagon Emergency) 1 MG/ML SOLR  (Patient not taking: Reported on 12/23/2024)      levonorgestrel-ethinyl estradiol (Altavera) 0.15-30 MG-MCG per tablet TAKE 1 TABLET " "BY MOUTH EVERY DAY (Patient not taking: Reported on 10/14/2024) 84 tablet 0    levothyroxine 25 mcg tablet Take 1 tablet (25 mcg total) by mouth daily 90 tablet 3    metFORMIN (GLUCOPHAGE-XR) 500 mg 24 hr tablet Take 2 tablets (1,000 mg total) by mouth 2 (two) times a day with meals 360 tablet 2    NovoLOG 100 UNIT/ML injection Up to 100 units per day with insulin pump 90 mL 3    Tresiba FlexTouch 100 units/mL injection pen Inject 32 Units under the skin daily 15 mL 2     No current facility-administered medications for this visit.     Allergies   Allergen Reactions    Shellfish-Derived Products - Food Allergy Anaphylaxis     Octopus, sea snails, claims, etc (ask patient)    Insulin Glargine Other (See Comments)     LANTUS BRAND -Burning and redness under skin        Objective   Vitals: Blood pressure 120/80, height 5' 1.75\" (1.568 m), weight 66.8 kg (147 lb 3.2 oz), not currently breastfeeding.  Invasive Devices       None                   Physical Exam  Vitals reviewed.   Constitutional:       General: She is not in acute distress.     Appearance: She is well-developed. She is not diaphoretic.   HENT:      Head: Normocephalic and atraumatic.   Eyes:      Conjunctiva/sclera: Conjunctivae normal.      Pupils: Pupils are equal, round, and reactive to light.   Neck:      Thyroid: No thyromegaly.   Cardiovascular:      Rate and Rhythm: Normal rate and regular rhythm.   Pulmonary:      Effort: Pulmonary effort is normal. No respiratory distress.      Breath sounds: Normal breath sounds.   Abdominal:      General: Bowel sounds are normal.      Palpations: Abdomen is soft.   Musculoskeletal:         General: Normal range of motion.      Cervical back: Normal range of motion and neck supple.   Skin:     General: Skin is warm and dry.      Findings: No rash.   Neurological:      Mental Status: She is alert and oriented to person, place, and time.      Motor: No abnormal muscle tone.   Psychiatric:         Behavior: " "Behavior normal.         The history was obtained from the review of the chart and from the patient.    Lab Results:    Most recent Alc is  Lab Results   Component Value Date    HGBA1C 9.0 (H) 12/04/2024           No components found for: \"HA1C\"  No components found for: \"GLU\"    Lab Results   Component Value Date    CREATININE 0.64 12/04/2024    CREATININE 0.65 09/10/2024    CREATININE 0.57 (L) 05/05/2024    BUN 12 12/04/2024    K 3.3 (L) 12/04/2024     12/04/2024    CO2 27 12/04/2024     GFR, Calculated   Date Value Ref Range Status   12/29/2020 69 >60 mL/min/1.73m2 Final     Comment:     mL/min per 1.73 square meters                                            Normal Function or Mild Renal    Disease (if clinically at risk):  >or=60  Moderately Decreased:                30-59  Severely Decreased:                  15-29  Renal Failure:                         <15                                            -American GFR: multiply reported GFR by 1.16    Please note that the eGFR is based on the CKD-EPI calculation, and is not intended to be used for drug dosing.     eGFRcr   Date Value Ref Range Status   07/09/2023 125 >59 Final     eGFR   Date Value Ref Range Status   12/04/2024 122 ml/min/1.73sq m Final     No components found for: \"MALBCRER\"    Lab Results   Component Value Date    HDL 73 09/04/2024    TRIG 95 09/04/2024       Lab Results   Component Value Date    ALT 11 12/04/2024    AST 17 12/04/2024    GGT 10 06/26/2019    ALKPHOS 73 12/04/2024       Lab Results   Component Value Date    TSH 1.11 07/07/2022    FREET4 0.65 10/02/2024           Future Appointments   Date Time Provider Department Center   1/8/2025  8:00 AM Carmela Newton, PT [facility]   1/10/2025  1:00 PM AN INF BED 1 AN Infusion AN HOSP [facility]   1/15/2025  8:00 AM Carmela Newton, PT [facility]   1/15/2025  2:30 PM Dell Treviño MD [facility]TH[facility]   2/7/2025  1:30 PM AN INF CHAIR 24 AN Infusion " "AN HOSP CC   2/12/2025 10:15 AM Chelle Tanner MD [facility]   2/13/2025 12:00 PM Alma Espitia MD NEURO ALL [facility]   3/14/2025  1:30 PM MARGARET Pagan DIAB CTR CAT [facility]   4/11/2025  2:30 PM Paola Phillips PA-C NEURO CTR VL [facility]   6/5/2025  9:00 AM Jn Hidalgo MD [facility] ENT  SPA   6/11/2025 10:15 AM MD ROSA Vieira [facility]   6/13/2025  9:45 AM Andrew Mariee DPM Pod [facility]   10/8/2025  9:00 AM Chelle Tanner MD [facility]   11/14/2025  2:45 PM Sonia Whitehead MD [facility]  [facility]       Portions of the record may have been created with voice recognition software. Occasional wrong word or \"sound a like\" substitutions may have occurred due to the inherent limitations of voice recognition software. Read the chart carefully and recognize, using context, where substitutions have occurred.    "

## 2024-12-24 ENCOUNTER — TELEPHONE (OUTPATIENT)
Dept: NEUROLOGY | Facility: CLINIC | Age: 29
End: 2024-12-24

## 2024-12-30 ENCOUNTER — OFFICE VISIT (OUTPATIENT)
Dept: URGENT CARE | Age: 29
End: 2024-12-30
Payer: COMMERCIAL

## 2024-12-30 ENCOUNTER — APPOINTMENT (OUTPATIENT)
Dept: RADIOLOGY | Age: 29
End: 2024-12-30
Payer: COMMERCIAL

## 2024-12-30 VITALS
DIASTOLIC BLOOD PRESSURE: 77 MMHG | BODY MASS INDEX: 27.1 KG/M2 | HEART RATE: 87 BPM | WEIGHT: 147 LBS | TEMPERATURE: 100.1 F | RESPIRATION RATE: 16 BRPM | OXYGEN SATURATION: 96 % | SYSTOLIC BLOOD PRESSURE: 128 MMHG

## 2024-12-30 DIAGNOSIS — R05.1 ACUTE COUGH: ICD-10-CM

## 2024-12-30 DIAGNOSIS — R50.9 FEVER, UNSPECIFIED FEVER CAUSE: ICD-10-CM

## 2024-12-30 DIAGNOSIS — J98.8 RESPIRATORY INFECTION: Primary | ICD-10-CM

## 2024-12-30 PROCEDURE — S9083 URGENT CARE CENTER GLOBAL: HCPCS

## 2024-12-30 PROCEDURE — G0382 LEV 3 HOSP TYPE B ED VISIT: HCPCS

## 2024-12-30 PROCEDURE — 71046 X-RAY EXAM CHEST 2 VIEWS: CPT

## 2024-12-30 PROCEDURE — 87636 SARSCOV2 & INF A&B AMP PRB: CPT

## 2024-12-30 RX ORDER — BENZONATATE 200 MG/1
200 CAPSULE ORAL 3 TIMES DAILY PRN
Qty: 20 CAPSULE | Refills: 0 | Status: SHIPPED | OUTPATIENT
Start: 2024-12-30

## 2024-12-30 RX ORDER — ALBUTEROL SULFATE 90 UG/1
2 INHALANT RESPIRATORY (INHALATION) EVERY 6 HOURS PRN
Qty: 8.5 G | Refills: 0 | Status: SHIPPED | OUTPATIENT
Start: 2024-12-30

## 2024-12-30 NOTE — PROGRESS NOTES
[facility]        NAME: Jenny Rodrigues is a 29 y.o. female  : 1995    MRN: [id number]  DATE: 2024  TIME: 2:55 PM    Assessment and Plan   Respiratory infection [J98.8]  1. Respiratory infection  benzonatate (TESSALON) 200 MG capsule    albuterol (ProAir HFA) 90 mcg/act inhaler      2. Fever, unspecified fever cause  XR chest pa and lateral    Covid/Flu- Office Collect Normal      3. Acute cough  XR chest pa and lateral        CXR reviewed, no acute cardiopulmonary disease identified, pending radiology review.     Pending COVID/flu PCR    Patient Instructions     Please trial Tessalon every 8 hours as needed for cough.   Please trial Albuterol every 6 hours as needed for chest tightness.   Continue with OTC cough and cold medication.   Drink plenty of fluids.   Please  alternate ibuprofen and Tylenol as needed for fever, and ensure adequate fluid intake and urine output.  Please trial warm salt water gargles, Chloraseptic spray, Cepacol cough drops and warm tea with honey as needed for sore throat.  Follow up with PCP in 3-5 days.  Proceed to  ER if symptoms worsen.    If tests are performed, our office will contact you with results only if changes need to made to the care plan discussed with you at the visit. You can review your full results on [facility].    Chief Complaint     Chief Complaint   Patient presents with    Cold Like Symptoms     Fever, cough, sore throat and fatigue x 6 days          History of Present Illness       29-year-old female presents for evaluation of respiratory symptoms.  Patient states over the past 6 days she has been experiencing congestion, sore throat, raspy voice, cough, chest tightness, bodyaches and headaches.  She states that today she developed a fever, Tmax 100.7.  She has been taking Tylenol and DayQuil with minimal relief of her symptoms. Patient notes that she has a history of lupus and her joints feel swollen.         Review of Systems  "  Review of Systems   Constitutional:  Positive for chills and fever.   HENT:  Positive for congestion, sore throat and voice change.    Respiratory:  Positive for cough and chest tightness. Negative for shortness of breath.    Cardiovascular:  Negative for chest pain.   Gastrointestinal:  Negative for diarrhea, nausea and vomiting.   Musculoskeletal:  Positive for myalgias.   Neurological:  Positive for headaches.   All other systems reviewed and are negative.        Current Medications       Current Outpatient Medications:     albuterol (ProAir HFA) 90 mcg/act inhaler, Inhale 2 puffs every 6 (six) hours as needed for wheezing or shortness of breath, Disp: 8.5 g, Rfl: 0    benzonatate (TESSALON) 200 MG capsule, Take 1 capsule (200 mg total) by mouth 3 (three) times a day as needed for cough, Disp: 20 capsule, Rfl: 0    acetaminophen (TYLENOL) 325 mg tablet, Take 3 tablets (975 mg total) by mouth every 8 (eight) hours as needed for mild pain or headaches, Disp: , Rfl:     Atogepant (Qulipta) 10 MG TABS, 1 tab daily., Disp: 90 tablet, Rfl: 0    BD Insulin Syringe U/F 31G X 5/16\" 0.5 ML MISC, Use as directly with weekly methotrexate injection., Disp: 100 each, Rfl: 0    Belimumab (BENLYSTA IV), Inject into a catheter in a vein every month to absorb continually Switching to monthly on 12/5, Disp: , Rfl:     clindamycin (CLEOCIN T) 1 % lotion, Apply 1 application. topically 2 (two) times a day, Disp: , Rfl:     Cyanocobalamin 1000 MCG SUBL, Place 1 tablet (1,000 mcg total) under the tongue daily, Disp: 90 tablet, Rfl: 0    EPINEPHrine (EPIPEN) 0.3 mg/0.3 mL SOAJ, Inject 0.3 mL (0.3 mg total) into a muscle once for 1 dose, Disp: 0.6 mL, Rfl: 0    famotidine (PEPCID) 40 MG tablet, Take 1 tablet (40 mg total) by mouth daily at bedtime, Disp: 90 tablet, Rfl: 3    fluticasone (FLONASE) 50 mcg/act nasal spray, SPRAY 1 SPRAY INTO EACH NOSTRIL EVERY DAY, Disp: 24 mL, Rfl: 3    folic acid (FOLVITE) 1 mg tablet, TAKE 1 TABLET BY " MOUTH EVERY DAY, Disp: 90 tablet, Rfl: 3    gabapentin (Neurontin) 600 MG tablet, Take 1 tablet (600 mg total) by mouth daily, Disp: 90 tablet, Rfl: 1    Galcanezumab-gnlm (Emgality) 120 MG/ML SOAJ, One ml subcutaneous on the right thigh and 1 ml subcutaneous on the left thigh at the same time for 1 dose (Patient not taking: Reported on 10/14/2024), Disp: 2 mL, Rfl: 0    Galcanezumab-gnlm (Emgality) 120 MG/ML SOAJ, 30 days after loading dose, inject 1 pen subq every 30 days. (Patient not taking: Reported on 10/14/2024), Disp: 1 mL, Rfl: 11    Glucagon (Gvoke HypoPen 2-Pack) 1 MG/0.2ML SOAJ, 1 mg PRN for severe hypoglycemia, Disp: 0.4 mL, Rfl: 0    Glucagon HCl (Glucagon Emergency) 1 MG/ML SOLR, , Disp: , Rfl:     hydroxychloroquine (PLAQUENIL) 200 mg tablet, Take 1 tablet (200 mg total) by mouth 2 (two) times a day with meals, Disp: 180 tablet, Rfl: 3    Insulin Infusion Pump (T:slim X2 Ins Pump/Control-IQ) ANURAG, Use, Disp: , Rfl:     Insulin Pen Needle (BD PEN NEEDLE NASIM U/F) 32G X 4 MM MISC, by Does not apply route 4 (four) times a day for 180 days, Disp: 400 each, Rfl: 3    ketorolac (TORADOL) 10 mg tablet, Take 1 tablet (10 mg total) by mouth every 6 (six) hours as needed (migraine) Max 2-3 per week., Disp: 10 tablet, Rfl: 0    levonorgestrel-ethinyl estradiol (Altavera) 0.15-30 MG-MCG per tablet, TAKE 1 TABLET BY MOUTH EVERY DAY (Patient not taking: Reported on 10/14/2024), Disp: 84 tablet, Rfl: 0    levothyroxine 25 mcg tablet, Take 1 tablet (25 mcg total) by mouth daily, Disp: 90 tablet, Rfl: 3    LORazepam (ATIVAN PO), Take by mouth daily as needed, Disp: , Rfl:     metFORMIN (GLUCOPHAGE-XR) 500 mg 24 hr tablet, Take 2 tablets (1,000 mg total) by mouth 2 (two) times a day with meals, Disp: 360 tablet, Rfl: 2    methotrexate 50 MG/2ML injection, INJECT 0.8 ML UNDER THE SKIN ONCE EVERY 7 DAYS. DISCARD UNUSED REMAINDER 30 DAYS AFTER INITIAL USE, Disp: 10 mL, Rfl: 3    Naproxen Sodium (ALEVE PO), Take by mouth  daily as needed, Disp: , Rfl:     NovoLOG 100 UNIT/ML injection, Up to 100 units per day with insulin pump, Disp: 90 mL, Rfl: 3    pantoprazole (PROTONIX) 40 mg tablet, TAKE 1 TABLET BY MOUTH EVERY DAY, Disp: 90 tablet, Rfl: 1    rizatriptan (MAXALT-MLT) 10 mg disintegrating tablet, 1 tab at migraine onset, repeat after 2 hours if needed; Max 2 per day and 3 per week., Disp: 9 tablet, Rfl: 2    sucralfate (CARAFATE) 1 g tablet, Take 1 tablet (1 g total) by mouth 4 (four) times a day, Disp: 40 tablet, Rfl: 0    Tresiba FlexTouch 100 units/mL injection pen, Inject 32 Units under the skin daily, Disp: 15 mL, Rfl: 2    Current Allergies     Allergies as of 12/30/2024 - Reviewed 12/30/2024   Allergen Reaction Noted    Shellfish-derived products - food allergy Anaphylaxis 06/21/2024    Insulin glargine Other (See Comments) 07/02/2019            The following portions of the patient's history were reviewed and updated as appropriate: allergies, current medications, past family history, past medical history, past social history, past surgical history and problem list.     Past Medical History:   Diagnosis Date    Abdominal pain     Anaphylaxis     Anxiety     Anxiety and depression     COVID-19 12/2020    Delayed emergence from anesthesia 03/23/2023    Severe episode of emergence delirium (confused, combative) after MAC anesthetic on 03/2023 for EGD. Received versed 2mg, >50mcg precedex, 5mg IV haldol, and 50mcg fentanyl. Waxing and waning episodes of agitation. Any future anesthetics should NOT be done at [facility].    Delirium, induced by drug     anesthesia    Depression     Diabetes mellitus (HCC)     Disease of thyroid gland     Hashimoto    DKA, type 1 (HCC) 05/11/2021    Eating disorder     history of anorexia/bulemia 4670-3524    Fatigue     Food intolerance     Fracture of fibula     R Salter I    Gilbert disease     Hashimoto's disease 02/21/2020    Head injury     Headache(784.0)     Migraine     Nasal congestion      PTSD (post-traumatic stress disorder)     Rectal bleed     Seizures (HCC)     Type 1 diabetes (HCC)        Past Surgical History:   Procedure Laterality Date    COLONOSCOPY      EGD  03/23/2023    KNEE SURGERY Right 07/06/2020    NASAL SEPTOPLASTY W/ TURBINOPLASTY      SINUS SURGERY      SKIN BIOPSY      TURBINOPLASTY N/A 03/22/2021    Procedure: TURBINOPLASTY;  Surgeon: Jn Hidalgo MD;  Location: [facility];  Service: ENT    UPPER GASTROINTESTINAL ENDOSCOPY      WISDOM TOOTH EXTRACTION      WRIST SURGERY      left; Excision of ganglion       Family History   Problem Relation Age of Onset    Hypertension Mother     Migraines Mother         Headache    Diabetes type II Mother     Varicose Veins Mother     Hyperlipidemia Mother     Diabetes Mother     Arthritis Mother     Depression Mother     Hearing loss Mother     Anxiety disorder Mother     Eczema Father     Cholelithiasis Father     Hypertension Father     Sarcoidosis Father         Liver    Hyperlipidemia Father     Diabetes Father     Coronary artery disease Father     Nephrolithiasis Father     Cirrhosis Father     Alcohol abuse Father     Thyroid disease Sister     Hashimoto's thyroiditis Sister     Alcohol abuse Brother     Cancer Family     Diabetes Family     Hypertension Family     Thyroid disease Sister          Medications have been verified.        Objective   /77 (BP Location: Left arm, Patient Position: Sitting, Cuff Size: Adult)   Pulse 87   Temp 100.1 °F (37.8 °C) (Oral)   Resp 16   Wt 66.7 kg (147 lb)   LMP 12/29/2024   SpO2 96%   BMI 27.10 kg/m²        Physical Exam     Physical Exam  Vitals and nursing note reviewed.   Constitutional:       General: She is not in acute distress.     Appearance: Normal appearance. She is normal weight. She is not ill-appearing, toxic-appearing or diaphoretic.   HENT:      Head: Normocephalic and atraumatic.      Right Ear: Tympanic membrane normal.      Left Ear: Tympanic membrane normal.       Nose: Congestion present. No rhinorrhea.      Mouth/Throat:      Mouth: Mucous membranes are moist.      Pharynx: Posterior oropharyngeal erythema present. No oropharyngeal exudate.   Eyes:      General:         Right eye: No discharge.         Left eye: No discharge.   Cardiovascular:      Rate and Rhythm: Normal rate and regular rhythm.      Pulses: Normal pulses.      Heart sounds: Normal heart sounds. No murmur heard.     No friction rub. No gallop.   Pulmonary:      Effort: Pulmonary effort is normal. No respiratory distress.      Breath sounds: Normal breath sounds. No stridor. No wheezing, rhonchi or rales.   Chest:      Chest wall: No tenderness.   Abdominal:      General: Bowel sounds are normal.      Palpations: Abdomen is soft.      Tenderness: There is no abdominal tenderness.   Skin:     General: Skin is warm and dry.   Neurological:      Mental Status: She is alert.   Psychiatric:         Mood and Affect: Mood normal.         Behavior: Behavior normal.

## 2024-12-30 NOTE — PATIENT INSTRUCTIONS
Please trial Tessalon every 8 hours as needed for cough.   Please trial Albuterol every 6 hours as needed for chest tightness.   Continue with OTC cough and cold medication.   Drink plenty of fluids.   Please  alternate ibuprofen and Tylenol as needed for fever, and ensure adequate fluid intake and urine output.  Please trial warm salt water gargles, Chloraseptic spray, Cepacol cough drops and warm tea with honey as needed for sore throat.

## 2024-12-31 LAB
FLUAV RNA RESP QL NAA+PROBE: NEGATIVE
FLUBV RNA RESP QL NAA+PROBE: NEGATIVE
SARS-COV-2 RNA RESP QL NAA+PROBE: NEGATIVE

## 2025-01-08 DIAGNOSIS — Z30.41 ENCOUNTER FOR SURVEILLANCE OF CONTRACEPTIVE PILLS: ICD-10-CM

## 2025-01-08 RX ORDER — LEVONORGESTREL AND ETHINYL ESTRADIOL 0.15-0.03
1 KIT ORAL DAILY
Qty: 84 TABLET | Refills: 1 | Status: SHIPPED | OUTPATIENT
Start: 2025-01-08

## 2025-01-10 ENCOUNTER — HOSPITAL ENCOUNTER (OUTPATIENT)
Dept: INFUSION CENTER | Facility: CLINIC | Age: 30
End: 2025-01-10
Payer: COMMERCIAL

## 2025-01-10 VITALS
OXYGEN SATURATION: 98 % | RESPIRATION RATE: 16 BRPM | HEART RATE: 78 BPM | TEMPERATURE: 97.8 F | DIASTOLIC BLOOD PRESSURE: 80 MMHG | WEIGHT: 139 LBS | SYSTOLIC BLOOD PRESSURE: 121 MMHG | BODY MASS INDEX: 25.63 KG/M2

## 2025-01-10 DIAGNOSIS — M32.8 OTHER FORMS OF SYSTEMIC LUPUS ERYTHEMATOSUS, UNSPECIFIED ORGAN INVOLVEMENT STATUS (HCC): Primary | ICD-10-CM

## 2025-01-10 PROCEDURE — 96413 CHEMO IV INFUSION 1 HR: CPT

## 2025-01-10 RX ORDER — ACETAMINOPHEN 325 MG/1
650 TABLET ORAL ONCE
OUTPATIENT
Start: 2025-02-07

## 2025-01-10 RX ORDER — ACETAMINOPHEN 325 MG/1
650 TABLET ORAL ONCE
Status: COMPLETED | OUTPATIENT
Start: 2025-01-10 | End: 2025-01-10

## 2025-01-10 RX ORDER — DIPHENHYDRAMINE HCL 25 MG
25 TABLET ORAL ONCE
OUTPATIENT
Start: 2025-02-07

## 2025-01-10 RX ORDER — DIPHENHYDRAMINE HCL 25 MG
25 TABLET ORAL ONCE
Status: COMPLETED | OUTPATIENT
Start: 2025-01-10 | End: 2025-01-10

## 2025-01-10 RX ORDER — SODIUM CHLORIDE 9 MG/ML
20 INJECTION, SOLUTION INTRAVENOUS ONCE
OUTPATIENT
Start: 2025-02-07

## 2025-01-10 RX ORDER — SODIUM CHLORIDE 9 MG/ML
20 INJECTION, SOLUTION INTRAVENOUS ONCE
Status: COMPLETED | OUTPATIENT
Start: 2025-01-10 | End: 2025-01-10

## 2025-01-10 RX ADMIN — ACETAMINOPHEN 650 MG: 325 TABLET ORAL at 13:06

## 2025-01-10 RX ADMIN — SODIUM CHLORIDE 20 ML/HR: 0.9 INJECTION, SOLUTION INTRAVENOUS at 13:06

## 2025-01-10 RX ADMIN — DIPHENHYDRAMINE HYDROCHLORIDE 25 MG: 25 TABLET ORAL at 13:06

## 2025-01-10 RX ADMIN — BELIMUMAB 640 MG: 400 INJECTION, POWDER, LYOPHILIZED, FOR SOLUTION INTRAVENOUS at 13:50

## 2025-01-10 NOTE — PROGRESS NOTES
Patient arrives for Benlysta infusion. Denies recent s/S illness/infection and antibiotic use. PIV placed L hand, brisk blood return noted, flushed without resistance. Patient tolerated premeds, provided refreshments and comfort items, resting comfortably in recliner, call bell within reach.

## 2025-01-10 NOTE — LETTER
[facility] [place]  [address]  [address]  [place] [address]  Dept: [phone]    January 10, 2025     Patient: Jenny Rodrigues   YOB: 1995   Date of Visit: 1/10/2025       To Whom it May Concern:    Jenny Rodrigues is under my professional care. She was at the infusion center 1/10/2025.    If you have any questions or concerns, please don't hesitate to call.         Sincerely,          Alissa Lockhart RN

## 2025-01-10 NOTE — PROGRESS NOTES
Patient tolerated infusion without issue. PIV removed L hand, bleeding controlled, gauze and coban applied. Confirmed next scheduled appointment 2/7 at 1330, declined AVS, ambulatory from department.

## 2025-01-11 ENCOUNTER — APPOINTMENT (EMERGENCY)
Dept: CT IMAGING | Facility: HOSPITAL | Age: 30
End: 2025-01-11
Payer: COMMERCIAL

## 2025-01-11 ENCOUNTER — HOSPITAL ENCOUNTER (EMERGENCY)
Facility: HOSPITAL | Age: 30
Discharge: HOME | End: 2025-01-11
Attending: EMERGENCY MEDICINE | Admitting: EMERGENCY MEDICINE
Payer: COMMERCIAL

## 2025-01-11 ENCOUNTER — APPOINTMENT (EMERGENCY)
Dept: RADIOLOGY | Facility: HOSPITAL | Age: 30
End: 2025-01-11
Payer: COMMERCIAL

## 2025-01-11 VITALS
OXYGEN SATURATION: 100 % | TEMPERATURE: 98 F | SYSTOLIC BLOOD PRESSURE: 122 MMHG | DIASTOLIC BLOOD PRESSURE: 74 MMHG | WEIGHT: 138.89 LBS | BODY MASS INDEX: 25.56 KG/M2 | RESPIRATION RATE: 18 BRPM | HEIGHT: 62 IN | HEART RATE: 72 BPM

## 2025-01-11 DIAGNOSIS — R82.4 URINE KETONES: ICD-10-CM

## 2025-01-11 DIAGNOSIS — R10.9 ABDOMINAL PAIN: ICD-10-CM

## 2025-01-11 DIAGNOSIS — R11.2 NAUSEA & VOMITING: Primary | ICD-10-CM

## 2025-01-11 DIAGNOSIS — E86.0 DEHYDRATION: ICD-10-CM

## 2025-01-11 DIAGNOSIS — B34.9 VIRAL SYNDROME: ICD-10-CM

## 2025-01-11 LAB
ALBUMIN SERPL BCG-MCNC: 4.6 G/DL (ref 3.5–5)
ALP SERPL-CCNC: 81 U/L (ref 34–104)
ALT SERPL W P-5'-P-CCNC: 12 U/L (ref 7–52)
ANION GAP SERPL CALCULATED.3IONS-SCNC: 9 MMOL/L (ref 4–13)
AST SERPL W P-5'-P-CCNC: 18 U/L (ref 13–39)
B-OH-BUTYR SERPL-MCNC: 0.66 MMOL/L (ref 0.02–0.27)
BACTERIA UR QL AUTO: ABNORMAL /HPF
BASE EX.OXY STD BLDV CALC-SCNC: 67.1 % (ref 60–80)
BASE EXCESS BLDV CALC-SCNC: 0.2 MMOL/L
BASOPHILS # BLD AUTO: 0.03 THOUSANDS/ΜL (ref 0–0.1)
BASOPHILS NFR BLD AUTO: 0 % (ref 0–1)
BILIRUB SERPL-MCNC: 1.23 MG/DL (ref 0.2–1)
BILIRUB UR QL STRIP: NEGATIVE
BUN SERPL-MCNC: 12 MG/DL (ref 5–25)
CALCIUM SERPL-MCNC: 9.6 MG/DL (ref 8.4–10.2)
CARDIAC TROPONIN I PNL SERPL HS: <2 NG/L (ref ?–50)
CHLORIDE SERPL-SCNC: 105 MMOL/L (ref 96–108)
CLARITY UR: CLEAR
CO2 SERPL-SCNC: 26 MMOL/L (ref 21–32)
COLOR UR: YELLOW
CREAT SERPL-MCNC: 0.7 MG/DL (ref 0.6–1.3)
EOSINOPHIL # BLD AUTO: 0.06 THOUSAND/ΜL (ref 0–0.61)
EOSINOPHIL NFR BLD AUTO: 1 % (ref 0–6)
ERYTHROCYTE [DISTWIDTH] IN BLOOD BY AUTOMATED COUNT: 12.6 % (ref 11.6–15.1)
EXT PREGNANCY TEST URINE: NEGATIVE
EXT. CONTROL: NORMAL
GFR SERPL CREATININE-BSD FRML MDRD: 119 ML/MIN/1.73SQ M
GLUCOSE SERPL-MCNC: 118 MG/DL (ref 65–140)
GLUCOSE SERPL-MCNC: 123 MG/DL (ref 65–140)
GLUCOSE UR STRIP-MCNC: NEGATIVE MG/DL
HCO3 BLDV-SCNC: 25 MMOL/L (ref 24–30)
HCT VFR BLD AUTO: 40.8 % (ref 34.8–46.1)
HGB BLD-MCNC: 12.7 G/DL (ref 11.5–15.4)
HGB UR QL STRIP.AUTO: NEGATIVE
HOLD SPECIMEN: NORMAL
HOLD SPECIMEN: NORMAL
IMM GRANULOCYTES # BLD AUTO: 0.02 THOUSAND/UL (ref 0–0.2)
IMM GRANULOCYTES NFR BLD AUTO: 0 % (ref 0–2)
KETONES UR STRIP-MCNC: ABNORMAL MG/DL
LEUKOCYTE ESTERASE UR QL STRIP: NEGATIVE
LYMPHOCYTES # BLD AUTO: 2.51 THOUSANDS/ΜL (ref 0.6–4.47)
LYMPHOCYTES NFR BLD AUTO: 38 % (ref 14–44)
MCH RBC QN AUTO: 26.5 PG (ref 26.8–34.3)
MCHC RBC AUTO-ENTMCNC: 31.1 G/DL (ref 31.4–37.4)
MCV RBC AUTO: 85 FL (ref 82–98)
MONOCYTES # BLD AUTO: 0.52 THOUSAND/ΜL (ref 0.17–1.22)
MONOCYTES NFR BLD AUTO: 8 % (ref 4–12)
MUCOUS THREADS UR QL AUTO: ABNORMAL
NEUTROPHILS # BLD AUTO: 3.53 THOUSANDS/ΜL (ref 1.85–7.62)
NEUTS SEG NFR BLD AUTO: 53 % (ref 43–75)
NITRITE UR QL STRIP: NEGATIVE
NON-SQ EPI CELLS URNS QL MICRO: ABNORMAL /HPF
NRBC BLD AUTO-RTO: 0 /100 WBCS
O2 CT BLDV-SCNC: 12.6 ML/DL
PCO2 BLDV: 41 MM HG (ref 42–50)
PH BLDV: 7.4 [PH] (ref 7.3–7.4)
PH UR STRIP.AUTO: 6.5 [PH]
PLATELET # BLD AUTO: 248 THOUSANDS/UL (ref 149–390)
PMV BLD AUTO: 11.3 FL (ref 8.9–12.7)
PO2 BLDV: 33.8 MM HG (ref 35–45)
POTASSIUM SERPL-SCNC: 3.5 MMOL/L (ref 3.5–5.3)
PROT SERPL-MCNC: 7.5 G/DL (ref 6.4–8.4)
PROT UR STRIP-MCNC: ABNORMAL MG/DL
RBC # BLD AUTO: 4.8 MILLION/UL (ref 3.81–5.12)
RBC #/AREA URNS AUTO: ABNORMAL /HPF
SODIUM SERPL-SCNC: 140 MMOL/L (ref 135–147)
SP GR UR STRIP.AUTO: 1.03 (ref 1–1.03)
UROBILINOGEN UR STRIP-ACNC: 4 MG/DL
WBC # BLD AUTO: 6.67 THOUSAND/UL (ref 4.31–10.16)
WBC #/AREA URNS AUTO: ABNORMAL /HPF

## 2025-01-11 PROCEDURE — 82805 BLOOD GASES W/O2 SATURATION: CPT | Performed by: EMERGENCY MEDICINE

## 2025-01-11 PROCEDURE — 99284 EMERGENCY DEPT VISIT MOD MDM: CPT

## 2025-01-11 PROCEDURE — 36415 COLL VENOUS BLD VENIPUNCTURE: CPT

## 2025-01-11 PROCEDURE — 82010 KETONE BODYS QUAN: CPT | Performed by: EMERGENCY MEDICINE

## 2025-01-11 PROCEDURE — 71045 X-RAY EXAM CHEST 1 VIEW: CPT

## 2025-01-11 PROCEDURE — 82948 REAGENT STRIP/BLOOD GLUCOSE: CPT

## 2025-01-11 PROCEDURE — 81001 URINALYSIS AUTO W/SCOPE: CPT | Performed by: EMERGENCY MEDICINE

## 2025-01-11 PROCEDURE — 99285 EMERGENCY DEPT VISIT HI MDM: CPT | Performed by: EMERGENCY MEDICINE

## 2025-01-11 PROCEDURE — 96366 THER/PROPH/DIAG IV INF ADDON: CPT

## 2025-01-11 PROCEDURE — 80053 COMPREHEN METABOLIC PANEL: CPT | Performed by: EMERGENCY MEDICINE

## 2025-01-11 PROCEDURE — 81025 URINE PREGNANCY TEST: CPT

## 2025-01-11 PROCEDURE — 96372 THER/PROPH/DIAG INJ SC/IM: CPT

## 2025-01-11 PROCEDURE — 85025 COMPLETE CBC W/AUTO DIFF WBC: CPT | Performed by: EMERGENCY MEDICINE

## 2025-01-11 PROCEDURE — 84484 ASSAY OF TROPONIN QUANT: CPT | Performed by: EMERGENCY MEDICINE

## 2025-01-11 PROCEDURE — 74177 CT ABD & PELVIS W/CONTRAST: CPT

## 2025-01-11 PROCEDURE — 96365 THER/PROPH/DIAG IV INF INIT: CPT

## 2025-01-11 PROCEDURE — 93005 ELECTROCARDIOGRAM TRACING: CPT

## 2025-01-11 RX ORDER — PROMETHAZINE HYDROCHLORIDE 25 MG/ML
25 INJECTION, SOLUTION INTRAMUSCULAR; INTRAVENOUS ONCE
Status: COMPLETED | OUTPATIENT
Start: 2025-01-11 | End: 2025-01-11

## 2025-01-11 RX ORDER — ACETAMINOPHEN 10 MG/ML
1000 INJECTION, SOLUTION INTRAVENOUS ONCE
Status: COMPLETED | OUTPATIENT
Start: 2025-01-11 | End: 2025-01-11

## 2025-01-11 RX ORDER — PROMETHAZINE HYDROCHLORIDE 25 MG/1
25 TABLET ORAL EVERY 6 HOURS PRN
Qty: 12 TABLET | Refills: 0 | Status: SHIPPED | OUTPATIENT
Start: 2025-01-11 | End: 2025-01-14

## 2025-01-11 RX ADMIN — ACETAMINOPHEN 1000 MG: 10 INJECTION INTRAVENOUS at 22:08

## 2025-01-11 RX ADMIN — SODIUM CHLORIDE 1000 ML: 0.9 INJECTION, SOLUTION INTRAVENOUS at 22:08

## 2025-01-11 RX ADMIN — IOHEXOL 85 ML: 350 INJECTION, SOLUTION INTRAVENOUS at 22:16

## 2025-01-11 RX ADMIN — PROMETHAZINE HYDROCHLORIDE 25 MG: 25 INJECTION INTRAMUSCULAR; INTRAVENOUS at 22:08

## 2025-01-12 LAB
ATRIAL RATE: 86 BPM
P AXIS: 69 DEGREES
PR INTERVAL: 142 MS
QRS AXIS: 60 DEGREES
QRSD INTERVAL: 78 MS
QT INTERVAL: 344 MS
QTC INTERVAL: 411 MS
T WAVE AXIS: 14 DEGREES
VENTRICULAR RATE: 86 BPM

## 2025-01-12 PROCEDURE — 93010 ELECTROCARDIOGRAM REPORT: CPT | Performed by: INTERNAL MEDICINE

## 2025-01-12 NOTE — ED NOTES
IV inserted in triage, Patient educated and verbalized understanding that she can not leave ED with IV in place.      Rosenda Soto, RN  01/11/25 1952

## 2025-01-12 NOTE — ED ATTENDING ATTESTATION
1/11/2025  I, Kiko Andrade MD, saw and evaluated the patient. I have discussed the patient with the resident/non-physician practitioner and agree with the resident's/non-physician practitioner's findings, Plan of Care, and MDM as documented in the resident's/non-physician practitioner's note, except where noted. All available labs and Radiology studies were reviewed.  I was present for key portions of any procedure(s) performed by the resident/non-physician practitioner and I was immediately available to provide assistance.       At this point I agree with the current assessment done in the Emergency Department.  I have conducted an independent evaluation of this patient a history and physical is as follows: Nausea and diffuse abdominal discomfort.  Patient has type 1 diabetes and feels similar to previous episodes of DKA.  Laboratory studies not consistent with DKA.  Patient shows slight dehydration on laboratory studies.  CT abdomen/pelvis due to diffuse abdominal discomfort, no acute abnormality identified.  Patient feels better after IV fluids and antiemetic therapy.  She is stable for discharge.    Results Reviewed       Procedure Component Value Units Date/Time    POCT pregnancy, urine [[id number]]  (Normal) Collected: 01/11/25 2203    Lab Status: Final result Updated: 01/11/25 2203     EXT Preg Test, Ur Negative     Control Valid    [place] draw [[id number]] Collected: 01/11/25 1947    Lab Status: Final result Specimen: Blood from Arm, Left Updated: 01/11/25 2201    Narrative:      The following orders were created for panel order [place] draw.  Procedure                               Abnormality         Status                     ---------                               -----------         ------                     Gold top on hold[[id number]]                                 Final result               Green / Yellow tube on hold[[id number]]                      Final result               Green / Black  tube on hold[[id number]]                       Final result                 Please view results for these tests on the individual orders.    Beta Hydroxybutyrate [[id number]]  (Abnormal) Collected: 01/11/25 2106    Lab Status: Final result Specimen: Blood from Arm, Right Updated: 01/11/25 2134     Beta- Hydroxybutyrate 0.66 mmol/L     Urine Microscopic [[id number]]  (Abnormal) Collected: 01/11/25 2106    Lab Status: Final result Specimen: Urine, Clean Catch Updated: 01/11/25 2120     RBC, UA None Seen /hpf      WBC, UA 1-2 /hpf      Epithelial Cells Occasional /hpf      Bacteria, UA Occasional /hpf      MUCUS THREADS Innumerable    UA w Reflex to Microscopic w Reflex to Culture [[id number]]  (Abnormal) Collected: 01/11/25 2106    Lab Status: Final result Specimen: Urine, Clean Catch Updated: 01/11/25 2118     Color, UA Yellow     Clarity, UA Clear     Specific Gravity, UA 1.028     pH, UA 6.5     Leukocytes, UA Negative     Nitrite, UA Negative     Protein, UA Trace mg/dl      Glucose, UA Negative mg/dl      Ketones, UA 40 (2+) mg/dl      Urobilinogen, UA 4.0 mg/dl      Bilirubin, UA Negative     Occult Blood, UA Negative    Blood gas, venous [[id number]]  (Abnormal) Collected: 01/11/25 2106    Lab Status: Final result Specimen: Blood from Arm, Right Updated: 01/11/25 2118     pH, Dariel 7.403     pCO2, Dariel 41.0 mm Hg      pO2, Dariel 33.8 mm Hg      HCO3, Dariel 25.0 mmol/L      Base Excess, Dariel 0.2 mmol/L      O2 Content, Dariel 12.6 ml/dL      O2 HGB, VENOUS 67.1 %     HS Troponin 0hr (reflex protocol) [[id number]]  (Normal) Collected: 01/11/25 1947    Lab Status: Final result Specimen: Blood from Arm, Left Updated: 01/11/25 2031     hs TnI 0hr <2 ng/L     Comprehensive metabolic panel [[id number]]  (Abnormal) Collected: 01/11/25 1947    Lab Status: Final result Specimen: Blood from Arm, Left Updated: 01/11/25 2024     Sodium 140 mmol/L      Potassium 3.5 mmol/L      Chloride 105 mmol/L      CO2 26 mmol/L      ANION GAP 9 mmol/L       BUN 12 mg/dL      Creatinine 0.70 mg/dL      Glucose 123 mg/dL      Calcium 9.6 mg/dL      AST 18 U/L      ALT 12 U/L      Alkaline Phosphatase 81 U/L      Total Protein 7.5 g/dL      Albumin 4.6 g/dL      Total Bilirubin 1.23 mg/dL      eGFR 119 ml/min/1.73sq m     Narrative:      National Kid[employer] Disease Foundation guidelines for Chronic Kidney Disease (CKD):     Stage 1 with normal or high GFR (GFR > 90 mL/min/1.73 square meters)    Stage 2 Mild CKD (GFR = 60-89 mL/min/1.73 square meters)    Stage 3A Moderate CKD (GFR = 45-59 mL/min/1.73 square meters)    Stage 3B Moderate CKD (GFR = 30-44 mL/min/1.73 square meters)    Stage 4 Severe CKD (GFR = 15-29 mL/min/1.73 square meters)    Stage 5 End Stage CKD (GFR <15 mL/min/1.73 square meters)  Note: GFR calculation is accurate only with a steady state creatinine    CBC and differential [[id number]]  (Abnormal) Collected: 01/11/25 1947    Lab Status: Final result Specimen: Blood from Arm, Left Updated: 01/11/25 2009     WBC 6.67 Thousand/uL      RBC 4.80 Million/uL      Hemoglobin 12.7 g/dL      Hematocrit 40.8 %      MCV 85 fL      MCH 26.5 pg      MCHC 31.1 g/dL      RDW 12.6 %      MPV 11.3 fL      Platelets 248 Thousands/uL      nRBC 0 /100 WBCs      Segmented % 53 %      Immature Grans % 0 %      Lymphocytes % 38 %      Monocytes % 8 %      Eosinophils Relative 1 %      Basophils Relative 0 %      Absolute Neutrophils 3.53 Thousands/µL      Absolute Immature Grans 0.02 Thousand/uL      Absolute Lymphocytes 2.51 Thousands/µL      Absolute Monocytes 0.52 Thousand/µL      Eosinophils Absolute 0.06 Thousand/µL      Basophils Absolute 0.03 Thousands/µL     Fingerstick Glucose (POCT) [[id number]]  (Normal) Collected: 01/11/25 1950    Lab Status: Final result Specimen: Blood Updated: 01/11/25 1951     POC Glucose 118 mg/dl           CT abdomen pelvis with contrast   ED Interpretation by Benjamin Iqbal MD (01/11 2318)   Report from [facility]:    PROCEDURE INFORMATION: Exam: CT  Abdomen And Pelvis With Contrast Exam date and time: 1/11/2025 10:15 PM Age: 29 years old Clinical indication: Other: Upper abdominal pain, nausea, vomiting TECHNIQUE: Imaging protocol: Computed tomography of the abdomen and pelvis with contrast. Contrast material: OMNI 350; Contrast volume: 85 ml; Contrast route: INTRAVENOUS (IV); COMPARISON: 1. CT CHEST ABDOMEN PELVIS W CONTRAST 1/14/2024 11:31 PM 2. MRI ABDOMEN W WO CONTRAST AND MRCP 4/19/2023 7:54 PM 3. CT ABDOMEN PELVIS W CONTRAST 9/26/2022 6:07 PM FINDINGS: Limitations: Evaluation of intra-abdominal contents is limited by a paucity of intraperitoneal fat. Liver: There is fat deposition adjacent to the falciform ligament. There are low-density lesions in the liver which most likely reflect a combination of cysts and/or hemangiomas. Gallbladder and biliary ducts: No acute process. Pancreas: Normal. Spleen: Normal. Adrenal glands: The adrenal glands appear normal. Kidneys and ure   ters: There are bilateral simple appearing renal cysts. Stomach and bowel: There is large volume stool throughout the colon. Appendix: No evidence of appendicitis. Intraperitoneal space: There is a small volume of free fluid in the pelvis. Vasculature: The abdominal aorta and its major branches appear normal without evidence of aneurysm or stenosis. There are pelvic phleboliths. Lymph nodes: No lymphadenopathy. Urinary bladder: There is moderate distention of the urinary bladder. Reproductive: No acute process. Bones/joints: The visualized osseous structures of the abdomen and pelvis appear normal for patient age. Soft tissues: There is a small fat containing umbilical hernia. IMPRESSION: No acute inflammatory or obstructive process is identified. Incidental findings are described within the findings section.        XR chest 1 view portable   ED Interpretation by Benjamin Iqbal MD (01/11 2212)   No acute cardiopulmonary disease. Independently interpreted by myself.            ED Course          Critical Care Time  Procedures

## 2025-01-12 NOTE — ED PROVIDER NOTES
Time reflects when diagnosis was documented in both MDM as applicable and the Disposition within this note       Time User Action Codes Description Comment    1/11/2025 11:42 PM Benjamin Iqbal Add [R11.2] Nausea & vomiting     1/11/2025 11:42 PM Benjamin Iqbal Add [R10.9] Abdominal pain     1/11/2025 11:42 PM Benjamin Iqbal Add [R82.4] Urine ketones     1/11/2025 11:42 PM Benjamin Iqbal Add [E86.0] Dehydration     1/11/2025 11:43 PM Benjamin Iqbal Add [B34.9] Viral syndrome           ED Disposition       ED Disposition   Discharge    Condition   Stable    Date/Time   Sat Jan 11, 2025 11:42 PM    Comment   Jenny Rodrigues discharge to home/self care.                   Assessment & Plan       Medical Decision Making  Patient seen and examined.  Physical exam is notable for right greater than left abdominal tenderness as well as dry mucous membranes.  Remainder physical exam is within normal limits.  Differential includes but is not limited to DKA, gastroenteritis, cholecystitis, ascending cholangitis, appendicitis.  Appropriate labs and imaging ordered.    Anion gap is normal at 9, pH is 7.403, not acidotic.  Given this the patient is not likely in DKA despite positive urine ketones.  Suspect urine ketones due to dehydration and inability to keep food down causing the body to go into a ketotic state.  CT abdomen pelvis shows no acute abnormalities.  All results discussed with patient, she expresses understanding.  Patient reports feeling better with Phenergan and Tylenol.  She would like to be discharged home.  Patient is agreeable plan for discharge home with follow-up with primary care provider as well as gastroenterologist.  All questions answered and return precautions discussed.    Amount and/or Complexity of Data Reviewed  Labs: ordered.  Radiology: ordered and independent interpretation performed.    Risk  Prescription drug management.             Medications   sodium chloride 0.9 % bolus 1,000 mL (0 mL Intravenous  Stopped 1/11/25 2352)   acetaminophen (Ofirmev) injection 1,000 mg (0 mg Intravenous Stopped 1/11/25 2352)   promethazine (PHENERGAN) injection 25 mg (25 mg Intramuscular Given 1/11/25 2208)   iohexol (OMNIPAQUE) 350 MG/ML injection (MULTI-DOSE) 85 mL (85 mL Intravenous Given 1/11/25 2216)       ED Risk Strat Scores                          SBIRT 20yo+      Flowsheet Row Most Recent Value   Initial Alcohol Screen: US AUDIT-C     1. How often do you have a drink containing alcohol? 0 Filed at: 01/11/2025 1940   2. How many drinks containing alcohol do you have on a typical day you are drinking?  0 Filed at: 01/11/2025 1940   3a. Male UNDER 65: How often do you have five or more drinks on one occasion? 0 Filed at: 01/11/2025 1940   3b. FEMALE Any Age, or MALE 65+: How often do you have 4 or more drinks on one occassion? 0 Filed at: 01/11/2025 1940   Audit-C Score 0 Filed at: 01/11/2025 1940   CHEPE: How many times in the past year have you...    Used an illegal drug or used a prescription medication for non-medical reasons? Never Filed at: 01/11/2025 1940                            History of Present Illness       Chief Complaint   Patient presents with    Medical Problem     Patient comes in stating she believes she is in DKA. Reports has previously been in DKA and had similar symptoms. Reporting dry mouth, frequent urination, ketones in urine. States her blood sugars have been in the 200s at home. Also reporting CP, rates pain 3/10, Describes as sharp pain. Type 1 diabatic on pump.        Past Medical History:   Diagnosis Date    Abdominal pain     Anaphylaxis     Anxiety     Anxiety and depression     COVID-19 12/2020    Delayed emergence from anesthesia 03/23/2023    Severe episode of emergence delirium (confused, combative) after MAC anesthetic on 03/2023 for EGD. Received versed 2mg, >50mcg precedex, 5mg IV haldol, and 50mcg fentanyl. Waxing and waning episodes of agitation. Any future anesthetics should NOT be  done at [facility].    Delirium, induced by drug     anesthesia    Depression     Diabetes mellitus (HCC)     Disease of thyroid gland     Hashimoto    DKA, type 1 (HCC) 05/11/2021    Eating disorder     history of anorexia/bulemia [id number]    Fatigue     Food intolerance     Fracture of fibula     R Salter I    Gilbert disease     Hashimoto's disease 02/21/2020    Head injury     Headache(784.0)     Migraine     Nasal congestion     PTSD (post-traumatic stress disorder)     Rectal bleed     Seizures (HCC)     Type 1 diabetes (HCC)       Past Surgical History:   Procedure Laterality Date    COLONOSCOPY      EGD  03/23/2023    KNEE SURGERY Right 07/06/2020    NASAL SEPTOPLASTY W/ TURBINOPLASTY      SINUS SURGERY      SKIN BIOPSY      TURBINOPLASTY N/A 03/22/2021    Procedure: TURBINOPLASTY;  Surgeon: Jn Hidalgo MD;  Location: [facility];  Service: ENT    UPPER GASTROINTESTINAL ENDOSCOPY      WISDOM TOOTH EXTRACTION      WRIST SURGERY      left; Excision of ganglion      Family History   Problem Relation Age of Onset    Hypertension Mother     Migraines Mother         Headache    Diabetes type II Mother     Varicose Veins Mother     Hyperlipidemia Mother     Diabetes Mother     Arthritis Mother     Depression Mother     Hearing loss Mother     Anxiety disorder Mother     Eczema Father     Cholelithiasis Father     Hypertension Father     Sarcoidosis Father         Liver    Hyperlipidemia Father     Diabetes Father     Coronary artery disease Father     Nephrolithiasis Father     Cirrhosis Father     Alcohol abuse Father     Thyroid disease Sister     Hashimoto's thyroiditis Sister     Alcohol abuse Brother     Cancer Family     Diabetes Family     Hypertension Family     Thyroid disease Sister       Social History     Tobacco Use    Smoking status: Never     Passive exposure: Never    Smokeless tobacco: Never    Tobacco comments:     Tobacco smoke exposure (Father smokes cigars)   Vaping Use    Vaping status: Never  Used   Substance Use Topics    Alcohol use: Not Currently     Comment: rarely    Drug use: No      E-Cigarette/Vaping    E-Cigarette Use Never User       E-Cigarette/Vaping Substances    Nicotine No     THC No     CBD No     Flavoring No     Other No     Unknown No       I have reviewed and agree with the history as documented.     29-year-old woman presenting with dry mouth, nausea, vomiting after eating, and abdominal pain x 2 days.  She states the last 2 days she has been unable to keep food down without vomiting.  Emesis is nonbloody nonbilious.  She has had left upper quadrant abdominal pain now with some pain in the right upper quadrant as well.  She took an at home urine ketone test which noted her ketones were high so she presented to the ER with concerns for DKA.  She notes her blood sugars have been within the normal range at home however given high ketones in the urine she was concerned.  She was seen in urgent care on December 30 for nasal congestion and diagnosed with sinus infection.  Since that time she has not completely returned to baseline however she notes 2 days ago was when symptoms began worsening again.  She denies worsening of nasal congestion or upper respiratory symptoms and notes only worsening of nausea now with vomiting.  She endorses mild aching headache and states this is her normal headache.  She has not taken anything recently for pain because she has been unable to keep anything down.  She denies lightheadedness, dizziness, chest pain, shortness of breath, diarrhea.  The patient also notes she was supposed to have a nuclear medicine scan for her gallbladder done in November however she had to cancel the appointment and has not had a chance to reschedule it.  She follows with gastroenterology who ordered the scan.      History provided by:  Patient  Medical Problem  Associated symptoms: abdominal pain, headaches, nausea and vomiting    Associated symptoms: no chest pain, no  congestion, no cough, no diarrhea, no ear pain, no fever, no rash, no rhinorrhea, no shortness of breath and no sore throat        Review of Systems   Constitutional:  Negative for chills, diaphoresis and fever.   HENT:  Negative for congestion, ear pain, postnasal drip, rhinorrhea and sore throat.    Eyes:  Negative for pain and visual disturbance.   Respiratory:  Negative for cough, chest tightness and shortness of breath.    Cardiovascular:  Negative for chest pain and palpitations.   Gastrointestinal:  Positive for abdominal pain, nausea and vomiting. Negative for constipation and diarrhea.   Genitourinary:  Negative for dysuria and hematuria.   Musculoskeletal:  Negative for arthralgias and back pain.   Skin:  Negative for color change and rash.   Neurological:  Positive for headaches. Negative for dizziness, seizures, syncope, weakness, light-headedness and numbness.   All other systems reviewed and are negative.          Objective       ED Triage Vitals   Temperature Pulse Blood Pressure Respirations SpO2 Patient Position - Orthostatic VS   01/11/25 1939 01/11/25 1939 01/11/25 1939 01/11/25 1939 01/11/25 1939 01/11/25 1939   98.1 °F (36.7 °C) 83 141/94 20 100 % Sitting      Temp Source Heart Rate Source BP Location FiO2 (%) Pain Score    01/11/25 1939 01/11/25 1939 01/11/25 1939 -- 01/11/25 2352    Oral Monitor Right arm  No Pain      Vitals      Date and Time Temp Pulse SpO2 Resp BP Pain Score FACES Pain Rating User   01/11/25 2352 -- 72 100 % 18 122/74 No Pain -- SB   01/11/25 2105 98 °F (36.7 °C) 80 100 % 18 136/70 -- -- SB   01/11/25 1939 98.1 °F (36.7 °C) 83 100 % 20 141/94 -- -- JH            Physical Exam  Constitutional:       General: She is not in acute distress.     Appearance: She is not diaphoretic.   HENT:      Head: Normocephalic and atraumatic.      Nose: No congestion or rhinorrhea.      Mouth/Throat:      Mouth: Mucous membranes are moist.      Pharynx: No oropharyngeal exudate.   Eyes:       General: No scleral icterus.  Cardiovascular:      Rate and Rhythm: Normal rate and regular rhythm.      Heart sounds: Normal heart sounds. No murmur heard.     No friction rub. No gallop.   Pulmonary:      Effort: No respiratory distress.      Breath sounds: Normal breath sounds. No wheezing, rhonchi or rales.   Abdominal:      General: Abdomen is flat. There is no distension.      Palpations: Abdomen is soft.      Tenderness: There is abdominal tenderness in the right upper quadrant and left upper quadrant. There is no right CVA tenderness, left CVA tenderness, guarding or rebound. Negative signs include Barroso's sign, Rovsing's sign and McBurney's sign.   Lymphadenopathy:      Cervical: No cervical adenopathy.   Skin:     General: Skin is warm and dry.      Capillary Refill: Capillary refill takes less than 2 seconds.      Findings: No rash.   Neurological:      General: No focal deficit present.      Mental Status: She is alert and oriented to person, place, and time.         Results Reviewed       Procedure Component Value Units Date/Time    POCT pregnancy, urine [[id number]]  (Normal) Collected: 01/11/25 2203    Lab Status: Final result Updated: 01/11/25 2203     EXT Preg Test, Ur Negative     Control Valid    [place] draw [[id number]] Collected: 01/11/25 1947    Lab Status: Final result Specimen: Blood from Arm, Left Updated: 01/11/25 2201    Narrative:      The following orders were created for panel order [place] draw.  Procedure                               Abnormality         Status                     ---------                               -----------         ------                     Gold top on hold[[id number]]                                 Final result               Green / Yellow tube on hold[[id number]]                      Final result               Green / Black tube on hold[[id number]]                       Final result                 Please view results for these tests on the individual orders.     Beta Hydroxybutyrate [[id number]]  (Abnormal) Collected: 01/11/25 2106    Lab Status: Final result Specimen: Blood from Arm, Right Updated: 01/11/25 2134     Beta- Hydroxybutyrate 0.66 mmol/L     Urine Microscopic [[id number]]  (Abnormal) Collected: 01/11/25 2106    Lab Status: Final result Specimen: Urine, Clean Catch Updated: 01/11/25 2120     RBC, UA None Seen /hpf      WBC, UA 1-2 /hpf      Epithelial Cells Occasional /hpf      Bacteria, UA Occasional /hpf      MUCUS THREADS Innumerable    UA w Reflex to Microscopic w Reflex to Culture [[id number]]  (Abnormal) Collected: 01/11/25 2106    Lab Status: Final result Specimen: Urine, Clean Catch Updated: 01/11/25 2118     Color, UA Yellow     Clarity, UA Clear     Specific Gravity, UA 1.028     pH, UA 6.5     Leukocytes, UA Negative     Nitrite, UA Negative     Protein, UA Trace mg/dl      Glucose, UA Negative mg/dl      Ketones, UA 40 (2+) mg/dl      Urobilinogen, UA 4.0 mg/dl      Bilirubin, UA Negative     Occult Blood, UA Negative    Blood gas, venous [[id number]]  (Abnormal) Collected: 01/11/25 2106    Lab Status: Final result Specimen: Blood from Arm, Right Updated: 01/11/25 2118     pH, Dariel 7.403     pCO2, Dariel 41.0 mm Hg      pO2, Dariel 33.8 mm Hg      HCO3, Dariel 25.0 mmol/L      Base Excess, Dariel 0.2 mmol/L      O2 Content, Dariel 12.6 ml/dL      O2 HGB, VENOUS 67.1 %     HS Troponin 0hr (reflex protocol) [[id number]]  (Normal) Collected: 01/11/25 1947    Lab Status: Final result Specimen: Blood from Arm, Left Updated: 01/11/25 2031     hs TnI 0hr <2 ng/L     Comprehensive metabolic panel [[id number]]  (Abnormal) Collected: 01/11/25 1947    Lab Status: Final result Specimen: Blood from Arm, Left Updated: 01/11/25 2024     Sodium 140 mmol/L      Potassium 3.5 mmol/L      Chloride 105 mmol/L      CO2 26 mmol/L      ANION GAP 9 mmol/L      BUN 12 mg/dL      Creatinine 0.70 mg/dL      Glucose 123 mg/dL      Calcium 9.6 mg/dL      AST 18 U/L      ALT 12 U/L      Alkaline  Phosphatase 81 U/L      Total Protein 7.5 g/dL      Albumin 4.6 g/dL      Total Bilirubin 1.23 mg/dL      eGFR 119 ml/min/1.73sq m     Narrative:      National Kidney Disease Foundation guidelines for Chronic Kidney Disease (CKD):     Stage 1 with normal or high GFR (GFR > 90 mL/min/1.73 square meters)    Stage 2 Mild CKD (GFR = 60-89 mL/min/1.73 square meters)    Stage 3A Moderate CKD (GFR = 45-59 mL/min/1.73 square meters)    Stage 3B Moderate CKD (GFR = 30-44 mL/min/1.73 square meters)    Stage 4 Severe CKD (GFR = 15-29 mL/min/1.73 square meters)    Stage 5 End Stage CKD (GFR <15 mL/min/1.73 square meters)  Note: GFR calculation is accurate only with a steady state creatinine    CBC and differential [[id number]]  (Abnormal) Collected: 01/11/25 1947    Lab Status: Final result Specimen: Blood from Arm, Left Updated: 01/11/25 2009     WBC 6.67 Thousand/uL      RBC 4.80 Million/uL      Hemoglobin 12.7 g/dL      Hematocrit 40.8 %      MCV 85 fL      MCH 26.5 pg      MCHC 31.1 g/dL      RDW 12.6 %      MPV 11.3 fL      Platelets 248 Thousands/uL      nRBC 0 /100 WBCs      Segmented % 53 %      Immature Grans % 0 %      Lymphocytes % 38 %      Monocytes % 8 %      Eosinophils Relative 1 %      Basophils Relative 0 %      Absolute Neutrophils 3.53 Thousands/µL      Absolute Immature Grans 0.02 Thousand/uL      Absolute Lymphocytes 2.51 Thousands/µL      Absolute Monocytes 0.52 Thousand/µL      Eosinophils Absolute 0.06 Thousand/µL      Basophils Absolute 0.03 Thousands/µL     Fingerstick Glucose (POCT) [[id number]]  (Normal) Collected: 01/11/25 1950    Lab Status: Final result Specimen: Blood Updated: 01/11/25 1951     POC Glucose 118 mg/dl             CT abdomen pelvis with contrast   ED Interpretation by Benjamin Iqbal MD (01/11 2318)   Report from [facility]:    PROCEDURE INFORMATION: Exam: CT Abdomen And Pelvis With Contrast Exam date and time: 1/11/2025 10:15 PM Age: 29 years old Clinical indication: Other: Upper abdominal  pain, nausea, vomiting TECHNIQUE: Imaging protocol: Computed tomography of the abdomen and pelvis with contrast. Contrast material: OMNI 350; Contrast volume: 85 ml; Contrast route: INTRAVENOUS (IV); COMPARISON: 1. CT CHEST ABDOMEN PELVIS W CONTRAST 1/14/2024 11:31 PM 2. MRI ABDOMEN W WO CONTRAST AND MRCP 4/19/2023 7:54 PM 3. CT ABDOMEN PELVIS W CONTRAST 9/26/2022 6:07 PM FINDINGS: Limitations: Evaluation of intra-abdominal contents is limited by a paucity of intraperitoneal fat. Liver: There is fat deposition adjacent to the falciform ligament. There are low-density lesions in the liver which most likely reflect a combination of cysts and/or hemangiomas. Gallbladder and biliary ducts: No acute process. Pancreas: Normal. Spleen: Normal. Adrenal glands: The adrenal glands appear normal. Kidneys and ure   ters: There are bilateral simple appearing renal cysts. Stomach and bowel: There is large volume stool throughout the colon. Appendix: No evidence of appendicitis. Intraperitoneal space: There is a small volume of free fluid in the pelvis. Vasculature: The abdominal aorta and its major branches appear normal without evidence of aneurysm or stenosis. There are pelvic phleboliths. Lymph nodes: No lymphadenopathy. Urinary bladder: There is moderate distention of the urinary bladder. Reproductive: No acute process. Bones/joints: The visualized osseous structures of the abdomen and pelvis appear normal for patient age. Soft tissues: There is a small fat containing umbilical hernia. IMPRESSION: No acute inflammatory or obstructive process is identified. Incidental findings are described within the findings section.        XR chest 1 view portable   ED Interpretation by Benjamin Iqbal MD (01/11 2212)   No acute cardiopulmonary disease. Independently interpreted by myself.          Procedures    ED Medication and Procedure Management   Prior to Admission Medications   Prescriptions Last Dose Informant Patient Reported? Taking?  "  Atogepant (Qulipta) 10 MG TABS   No No   Si tab daily.   BD Insulin Syringe U/F 31G X /16\" 0.5 ML MISC   No No   Sig: Use as directly with weekly methotrexate injection.   Belimumab (BENLYSTA IV)  Self Yes No   Sig: Inject into a catheter in a vein every month to absorb continually Switching to monthly on    Cyanocobalamin 1000 MCG SUBL  Self No No   Sig: Place 1 tablet (1,000 mcg total) under the tongue daily   EPINEPHrine (EPIPEN) 0.3 mg/0.3 mL SOAJ   No No   Sig: Inject 0.3 mL (0.3 mg total) into a muscle once for 1 dose   Galcanezumab-gnlm (Emgality) 120 MG/ML SOAJ  Self No No   Sig: One ml subcutaneous on the right thigh and 1 ml subcutaneous on the left thigh at the same time for 1 dose   Patient not taking: Reported on 10/14/2024   Galcanezumab-gnlm (Emgality) 120 MG/ML SOAJ  Self No No   Si days after loading dose, inject 1 pen subq every 30 days.   Patient not taking: Reported on 10/14/2024   Glucagon (Gvoke HypoPen 2-Pack) 1 MG/0.2ML SOAJ  Self No No   Si mg PRN for severe hypoglycemia   Glucagon HCl (Glucagon Emergency) 1 MG/ML SOLR  Self Yes No   Patient not taking: Reported on 2024   Insulin Infusion Pump (T:slim X2 Ins Pump/Control-IQ) ANURAG   Yes No   Sig: Use   Insulin Pen Needle (BD PEN NEEDLE NASIM U/F) 32G X 4 MM MISC  Self No No   Sig: by Does not apply route 4 (four) times a day for 180 days   LORazepam (ATIVAN PO)  Self Yes No   Sig: Take by mouth daily as needed   Naproxen Sodium (ALEVE PO)  Self Yes No   Sig: Take by mouth daily as needed   NovoLOG 100 UNIT/ML injection   No No   Sig: Up to 100 units per day with insulin pump   Tresiba FlexTouch 100 units/mL injection pen   No No   Sig: Inject 32 Units under the skin daily   acetaminophen (TYLENOL) 325 mg tablet  Self No No   Sig: Take 3 tablets (975 mg total) by mouth every 8 (eight) hours as needed for mild pain or headaches   albuterol (ProAir HFA) 90 mcg/act inhaler   No No   Sig: Inhale 2 puffs every 6 (six) hours " as needed for wheezing or shortness of breath   benzonatate (TESSALON) 200 MG capsule   No No   Sig: Take 1 capsule (200 mg total) by mouth 3 (three) times a day as needed for cough   clindamycin (CLEOCIN T) 1 % lotion  Self Yes No   Sig: Apply 1 application. topically 2 (two) times a day   famotidine (PEPCID) 40 MG tablet  Self No No   Sig: Take 1 tablet (40 mg total) by mouth daily at bedtime   fluticasone (FLONASE) 50 mcg/act nasal spray   No No   Sig: SPRAY 1 SPRAY INTO EACH NOSTRIL EVERY DAY   folic acid (FOLVITE) 1 mg tablet  Self No No   Sig: TAKE 1 TABLET BY MOUTH EVERY DAY   gabapentin (Neurontin) 600 MG tablet  Self No No   Sig: Take 1 tablet (600 mg total) by mouth daily   hydroxychloroquine (PLAQUENIL) 200 mg tablet  Self No No   Sig: Take 1 tablet (200 mg total) by mouth 2 (two) times a day with meals   ketorolac (TORADOL) 10 mg tablet  Self No No   Sig: Take 1 tablet (10 mg total) by mouth every 6 (six) hours as needed (migraine) Max 2-3 per week.   levonorgestrel-ethinyl estradiol ([place]a) 0.15-30 MG-MCG per tablet   No No   Sig: TAKE 1 TABLET BY MOUTH EVERY DAY   levothyroxine 25 mcg tablet   No No   Sig: Take 1 tablet (25 mcg total) by mouth daily   metFORMIN (GLUCOPHAGE-XR) 500 mg 24 hr tablet   No No   Sig: Take 2 tablets (1,000 mg total) by mouth 2 (two) times a day with meals   methotrexate 50 MG/2ML injection   No No   Sig: INJECT 0.8 ML UNDER THE SKIN ONCE EVERY 7 DAYS. DISCARD UNUSED REMAINDER 30 DAYS AFTER INITIAL USE   pantoprazole (PROTONIX) 40 mg tablet   No No   Sig: TAKE 1 TABLET BY MOUTH EVERY DAY   rizatriptan (MAXALT-MLT) 10 mg disintegrating tablet  Self No No   Si tab at migraine onset, repeat after 2 hours if needed; Max 2 per day and 3 per week.   sucralfate (CARAFATE) 1 g tablet  Self No No   Sig: Take 1 tablet (1 g total) by mouth 4 (four) times a day      Facility-Administered Medications: None     Discharge Medication List as of 2025 11:46 PM        START taking  "these medications    Details   promethazine (PHENERGAN) 25 mg tablet Take 1 tablet (25 mg total) by mouth every 6 (six) hours as needed for nausea or vomiting for up to 3 days, Starting Sat 1/11/2025, Until Tue 1/14/2025 at 2359, Normal           CONTINUE these medications which have NOT CHANGED    Details   acetaminophen (TYLENOL) 325 mg tablet Take 3 tablets (975 mg total) by mouth every 8 (eight) hours as needed for mild pain or headaches, Starting Sun 5/5/2024, No Print      albuterol (ProAir HFA) 90 mcg/act inhaler Inhale 2 puffs every 6 (six) hours as needed for wheezing or shortness of breath, Starting Mon 12/30/2024, Normal      Atogepant (Qulipta) 10 MG TABS 1 tab daily., Normal      BD Insulin Syringe U/F 31G X 5/16\" 0.5 ML MISC Use as directly with weekly methotrexate injection., Normal      Belimumab (BENLYSTA IV) Inject into a catheter in a vein every month to absorb continually Switching to monthly on 12/5, Historical Med      benzonatate (TESSALON) 200 MG capsule Take 1 capsule (200 mg total) by mouth 3 (three) times a day as needed for cough, Starting Mon 12/30/2024, Normal      clindamycin (CLEOCIN T) 1 % lotion Apply 1 application. topically 2 (two) times a day, Starting Tue 10/17/2023, Historical Med      Cyanocobalamin 1000 MCG SUBL Place 1 tablet (1,000 mcg total) under the tongue daily, Starting Fri 1/26/2024, Normal      EPINEPHrine (EPIPEN) 0.3 mg/0.3 mL SOAJ Inject 0.3 mL (0.3 mg total) into a muscle once for 1 dose, Starting Fri 6/21/2024, Normal      famotidine (PEPCID) 40 MG tablet Take 1 tablet (40 mg total) by mouth daily at bedtime, Starting Thu 8/1/2024, Normal      fluticasone (FLONASE) 50 mcg/act nasal spray SPRAY 1 SPRAY INTO EACH NOSTRIL EVERY DAY, Normal      folic acid (FOLVITE) 1 mg tablet TAKE 1 TABLET BY MOUTH EVERY DAY, Starting Tue 5/28/2024, Normal      gabapentin (Neurontin) 600 MG tablet Take 1 tablet (600 mg total) by mouth daily, Starting Mon 9/2/2024, Normal      !! " Galcanezumab-gnlm (Emgality) 120 MG/ML SOAJ One ml subcutaneous on the right thigh and 1 ml subcutaneous on the left thigh at the same time for 1 dose, Normal      !! Galcanezumab-gnlm (Emgality) 120 MG/ML SOAJ 30 days after loading dose, inject 1 pen subq every 30 days., Normal      Glucagon (Gvoke HypoPen 2-Pack) 1 MG/0.2ML SOAJ 1 mg PRN for severe hypoglycemia, Normal      Glucagon HCl (Glucagon Emergency) 1 MG/ML SOLR Historical Med      hydroxychloroquine (PLAQUENIL) 200 mg tablet Take 1 tablet (200 mg total) by mouth 2 (two) times a day with meals, Starting Fri 3/31/2023, Until Mon 12/23/2024, Normal      Insulin Infusion Pump (T:slim X2 Ins Pump/Control-IQ) ANURAG Use, Historical Med      Insulin Pen Needle (BD PEN NEEDLE NASIM U/F) 32G X 4 MM MISC by Does not apply route 4 (four) times a day for 180 days, Starting Wed 6/5/2019, Until Mon 12/23/2024, Normal      ketorolac (TORADOL) 10 mg tablet Take 1 tablet (10 mg total) by mouth every 6 (six) hours as needed (migraine) Max 2-3 per week., Starting Thu 10/10/2024, Normal      levonorgestrel-ethinyl estradiol (Altavera) 0.15-30 MG-MCG per tablet TAKE 1 TABLET BY MOUTH EVERY DAY, Starting Wed 1/8/2025, Normal      levothyroxine 25 mcg tablet Take 1 tablet (25 mcg total) by mouth daily, Starting Mon 12/23/2024, Normal      LORazepam (ATIVAN PO) Take by mouth daily as needed, Historical Med      metFORMIN (GLUCOPHAGE-XR) 500 mg 24 hr tablet Take 2 tablets (1,000 mg total) by mouth 2 (two) times a day with meals, Starting Mon 12/23/2024, Normal      methotrexate 50 MG/2ML injection INJECT 0.8 ML UNDER THE SKIN ONCE EVERY 7 DAYS. DISCARD UNUSED REMAINDER 30 DAYS AFTER INITIAL USE, Normal      Naproxen Sodium (ALEVE PO) Take by mouth daily as needed, Historical Med      NovoLOG 100 UNIT/ML injection Up to 100 units per day with insulin pump, Normal      pantoprazole (PROTONIX) 40 mg tablet TAKE 1 TABLET BY MOUTH EVERY DAY, Starting Wed 11/27/2024, Normal       rizatriptan (MAXALT-MLT) 10 mg disintegrating tablet 1 tab at migraine onset, repeat after 2 hours if needed; Max 2 per day and 3 per week., Normal      sucralfate (CARAFATE) 1 g tablet Take 1 tablet (1 g total) by mouth 4 (four) times a day, Starting Wed 10/30/2024, Normal      Tresiba FlexTouch 100 units/mL injection pen Inject 32 Units under the skin daily, Starting Mon 12/23/2024, Normal       !! - Potential duplicate medications found. Please discuss with provider.        No discharge procedures on file.  ED SEPSIS DOCUMENTATION   Time reflects when diagnosis was documented in both MDM as applicable and the Disposition within this note       Time User Action Codes Description Comment    1/11/2025 11:42 PM Benjamin Iqbal [R11.2] Nausea & vomiting     1/11/2025 11:42 PM Benjamin Iqbal [R10.9] Abdominal pain     1/11/2025 11:42 PM Benjamin Iqbal [R82.4] Urine ketones     1/11/2025 11:42 PM Benjamin Iqbal [E86.0] Dehydration     1/11/2025 11:43 PM Benjamin Iqbal [B34.9] Viral syndrome                  Benjamin Iqbal MD  01/12/25 0002

## 2025-01-15 ENCOUNTER — OFFICE VISIT (OUTPATIENT)
Dept: OBGYN CLINIC | Facility: OTHER | Age: 30
End: 2025-01-15
Payer: COMMERCIAL

## 2025-01-15 VITALS — BODY MASS INDEX: 25.4 KG/M2 | WEIGHT: 138 LBS | HEIGHT: 62 IN

## 2025-01-15 DIAGNOSIS — M25.851 FEMOROACETABULAR IMPINGEMENT OF RIGHT HIP: Primary | ICD-10-CM

## 2025-01-15 PROCEDURE — 99214 OFFICE O/P EST MOD 30 MIN: CPT | Performed by: ORTHOPAEDIC SURGERY

## 2025-01-15 NOTE — PROGRESS NOTES
Orthopaedic Surgery - Office Note  Jenny Rodrigues (29 y.o. 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 We discussed hip arthroscopy in the future if current pain is not from lupus.  Follow-up:  Return after meeting with rheumatologist.      Chief Complaint / Date of Onset  Right hip pain, began in August with no inciting event   Injury Mechanism / Date  None  Surgery / Date  None    History of Present Illness   Jenny Rodrigues is a 29 y.o. female who presents for follow-up evaluation of the right hip. She is accompanied by her spouse at today's visit. Since her previous visit, she reports no changes in pain and symptoms. Due to the holidays, she has lapsed in attending outpatient PT but has performed a HEP weekly. She continues to experience sharp pain in the right hip with prolonged sitting, weight bearing related activities, and during intimacy. There is a continued snapping sensation with hip rotation. Pain is managed with activity modification and Alieve as needed. She was sent for blood work in December, and will be following-up with her rheumatologist next month. She denies back pain and radiating symptoms.     Treatment Summary  Medications / Modalities  Alieve and Tylenol prn with mild temporary relief   Bracing / Immobilization  None  Physical Therapy  Began on 10/29 and continuing to attend   Injections  2024 lidocaine injection with about 80% relief for 24 hours  Prior Surgeries  None  Other Treatments  Chinese medicine with no relief       Employment / Current Status  Works from home     Sport / Organization / Current Status  N.A      Review of Systems  Pertinent items are noted in HPI.  All other systems were reviewed and  "are negative.      Physical Exam  Ht 5' 1.75\" (1.568 m)   Wt 62.6 kg (138 lb)   LMP 12/29/2024   BMI 25.45 kg/m²   Cons: Appears well.  No apparent distress.  Psych: Alert. Oriented x3.  Mood and affect normal.  Eyes: PERRLA, EOMI  Resp: Normal effort.  No audible wheezing or stridor.  CV: Palpable pulse.  No discernable arrhythmia.    Lymph:  No palpable cervical, axillary, or inguinal lymphadenopathy.  Skin: Warm.  No palpable masses.  No visible lesions.  Neuro: Normal muscle tone.  Normal and symmetric DTR's.     Right Hip Exam  Alignment / Posture:  Normal resting hip posture.  Inspection:  No swelling. No ecchymosis.  Palpation:   Moderate groin tenderness. No effusion.  ROM:  Hip Flexion 100. Hip ER 60. Hip IR 30.  Strength:  Hip Flexors 4-/5. Hip Abductors 4-/5.  Stability:  (+) Logroll.  Tests:  (+) Stinchfield. (+) FADDIR. (+) PILLO.  Neurovascular:  Sensation intact in DP/SP/Carrillo/Sa/T nerve distributions. 2+ DP & PT pulses.  Gait:  Antalgic.        Studies Reviewed  No studies to review      Procedures  No procedures today.    Medical, Surgical, Family, and Social History  The patient's medical history, family history, and social history, were reviewed and updated as appropriate.    Past Medical History:   Diagnosis Date    Abdominal pain     Anaphylaxis     Anxiety     Anxiety and depression     COVID-19 12/2020    Delayed emergence from anesthesia 03/23/2023    Severe episode of emergence delirium (confused, combative) after MAC anesthetic on 03/2023 for EGD. Received versed 2mg, >50mcg precedex, 5mg IV haldol, and 50mcg fentanyl. Waxing and waning episodes of agitation. Any future anesthetics should NOT be done at [facility].    Delirium, induced by drug     anesthesia    Depression     Diabetes mellitus (HCC)     Disease of thyroid gland     Hashimoto    DKA, type 1 (HCC) 05/11/2021    Eating disorder     history of anorexia/bulemia 3147-8595    Fatigue     Food intolerance     Fracture of fibula     R " Salter I    Gilbert disease     Hashimoto's disease 02/21/2020    Head injury     Headache(784.0)     Migraine     Nasal congestion     PTSD (post-traumatic stress disorder)     Rectal bleed     Seizures (HCC)     Type 1 diabetes (HCC)        Past Surgical History:   Procedure Laterality Date    COLONOSCOPY      EGD  03/23/2023    KNEE SURGERY Right 07/06/2020    NASAL SEPTOPLASTY W/ TURBINOPLASTY      SINUS SURGERY      SKIN BIOPSY      TURBINOPLASTY N/A 03/22/2021    Procedure: TURBINOPLASTY;  Surgeon: Jn Hidalgo MD;  Location: [facility];  Service: ENT    UPPER GASTROINTESTINAL ENDOSCOPY      WISDOM TOOTH EXTRACTION      WRIST SURGERY      left; Excision of ganglion       Family History   Problem Relation Age of Onset    Hypertension Mother     Migraines Mother         Headache    Diabetes type II Mother     Varicose Veins Mother     Hyperlipidemia Mother     Diabetes Mother     Arthritis Mother     Depression Mother     Hearing loss Mother     Anxiety disorder Mother     Eczema Father     Cholelithiasis Father     Hypertension Father     Sarcoidosis Father         Liver    Hyperlipidemia Father     Diabetes Father     Coronary artery disease Father     Nephrolithiasis Father     Cirrhosis Father     Alcohol abuse Father     Thyroid disease Sister     Hashimoto's thyroiditis Sister     Alcohol abuse Brother     Cancer Family     Diabetes Family     Hypertension Family     Thyroid disease Sister        Social History     Occupational History    Occupation: unemployed   Tobacco Use    Smoking status: Never     Passive exposure: Never    Smokeless tobacco: Never    Tobacco comments:     Tobacco smoke exposure (Father smokes cigars)   Vaping Use    Vaping status: Never Used   Substance and Sexual Activity    Alcohol use: Not Currently     Comment: rarely    Drug use: No    Sexual activity: Yes     Partners: Male     Birth control/protection: OCP       Allergies   Allergen Reactions    Shellfish-Derived  "Products - Food Allergy Anaphylaxis     Octopus, sea snails, claims, etc (ask patient)    Insulin Glargine Other (See Comments)     LANTUS BRAND -Burning and redness under skin          Current Outpatient Medications:     acetaminophen (TYLENOL) 325 mg tablet, Take 3 tablets (975 mg total) by mouth every 8 (eight) hours as needed for mild pain or headaches, Disp: , Rfl:     albuterol (ProAir HFA) 90 mcg/act inhaler, Inhale 2 puffs every 6 (six) hours as needed for wheezing or shortness of breath, Disp: 8.5 g, Rfl: 0    Atogepant (Qulipta) 10 MG TABS, 1 tab daily., Disp: 90 tablet, Rfl: 0    BD Insulin Syringe U/F 31G X 5/16\" 0.5 ML MISC, Use as directly with weekly methotrexate injection., Disp: 100 each, Rfl: 0    Belimumab (BENLYSTA IV), Inject into a catheter in a vein every month to absorb continually Switching to monthly on 12/5, Disp: , Rfl:     benzonatate (TESSALON) 200 MG capsule, Take 1 capsule (200 mg total) by mouth 3 (three) times a day as needed for cough, Disp: 20 capsule, Rfl: 0    clindamycin (CLEOCIN T) 1 % lotion, Apply 1 application. topically 2 (two) times a day, Disp: , Rfl:     Cyanocobalamin 1000 MCG SUBL, Place 1 tablet (1,000 mcg total) under the tongue daily, Disp: 90 tablet, Rfl: 0    famotidine (PEPCID) 40 MG tablet, Take 1 tablet (40 mg total) by mouth daily at bedtime, Disp: 90 tablet, Rfl: 3    fluticasone (FLONASE) 50 mcg/act nasal spray, SPRAY 1 SPRAY INTO EACH NOSTRIL EVERY DAY, Disp: 24 mL, Rfl: 3    folic acid (FOLVITE) 1 mg tablet, TAKE 1 TABLET BY MOUTH EVERY DAY, Disp: 90 tablet, Rfl: 3    gabapentin (Neurontin) 600 MG tablet, Take 1 tablet (600 mg total) by mouth daily, Disp: 90 tablet, Rfl: 1    Glucagon (Gvoke HypoPen 2-Pack) 1 MG/0.2ML SOAJ, 1 mg PRN for severe hypoglycemia, Disp: 0.4 mL, Rfl: 0    Insulin Infusion Pump (T:slim X2 Ins Pump/Control-IQ) ANURAG, Use, Disp: , Rfl:     ketorolac (TORADOL) 10 mg tablet, Take 1 tablet (10 mg total) by mouth every 6 (six) hours as " needed (migraine) Max 2-3 per week., Disp: 10 tablet, Rfl: 0    levonorgestrel-ethinyl estradiol (Altavera) 0.15-30 MG-MCG per tablet, TAKE 1 TABLET BY MOUTH EVERY DAY, Disp: 84 tablet, Rfl: 1    levothyroxine 25 mcg tablet, Take 1 tablet (25 mcg total) by mouth daily, Disp: 90 tablet, Rfl: 3    LORazepam (ATIVAN PO), Take by mouth daily as needed, Disp: , Rfl:     metFORMIN (GLUCOPHAGE-XR) 500 mg 24 hr tablet, Take 2 tablets (1,000 mg total) by mouth 2 (two) times a day with meals, Disp: 360 tablet, Rfl: 2    methotrexate 50 MG/2ML injection, INJECT 0.8 ML UNDER THE SKIN ONCE EVERY 7 DAYS. DISCARD UNUSED REMAINDER 30 DAYS AFTER INITIAL USE, Disp: 10 mL, Rfl: 3    Naproxen Sodium (ALEVE PO), Take by mouth daily as needed, Disp: , Rfl:     NovoLOG 100 UNIT/ML injection, Up to 100 units per day with insulin pump, Disp: 90 mL, Rfl: 3    pantoprazole (PROTONIX) 40 mg tablet, TAKE 1 TABLET BY MOUTH EVERY DAY, Disp: 90 tablet, Rfl: 1    rizatriptan (MAXALT-MLT) 10 mg disintegrating tablet, 1 tab at migraine onset, repeat after 2 hours if needed; Max 2 per day and 3 per week., Disp: 9 tablet, Rfl: 2    sucralfate (CARAFATE) 1 g tablet, Take 1 tablet (1 g total) by mouth 4 (four) times a day, Disp: 40 tablet, Rfl: 0    Tresiba FlexTouch 100 units/mL injection pen, Inject 32 Units under the skin daily, Disp: 15 mL, Rfl: 2    EPINEPHrine (EPIPEN) 0.3 mg/0.3 mL SOAJ, Inject 0.3 mL (0.3 mg total) into a muscle once for 1 dose, Disp: 0.6 mL, Rfl: 0    Galcanezumab-gnlm (Emgality) 120 MG/ML SOAJ, One ml subcutaneous on the right thigh and 1 ml subcutaneous on the left thigh at the same time for 1 dose (Patient not taking: Reported on 10/14/2024), Disp: 2 mL, Rfl: 0    Galcanezumab-gnlm (Emgality) 120 MG/ML SOAJ, 30 days after loading dose, inject 1 pen subq every 30 days. (Patient not taking: Reported on 10/14/2024), Disp: 1 mL, Rfl: 11    Glucagon HCl (Glucagon Emergency) 1 MG/ML SOLR, , Disp: , Rfl:     hydroxychloroquine  (PLAQUENIL) 200 mg tablet, Take 1 tablet (200 mg total) by mouth 2 (two) times a day with meals, Disp: 180 tablet, Rfl: 3    Insulin Pen Needle (BD PEN NEEDLE NASIM U/F) 32G X 4 MM MISC, by Does not apply route 4 (four) times a day for 180 days, Disp: 400 each, Rfl: 3    promethazine (PHENERGAN) 25 mg tablet, Take 1 tablet (25 mg total) by mouth every 6 (six) hours as needed for nausea or vomiting for up to 3 days, Disp: 12 tablet, Rfl: 0      Shahid Meza    Scribe Attestation      I,:  Shahid Meza am acting as a scribe while in the presence of the attending physician.:       I,:  Dell Treviño MD personally performed the services described in this documentation    as scribed in my presence.:

## 2025-01-25 ENCOUNTER — PATIENT MESSAGE (OUTPATIENT)
Dept: ENDOCRINOLOGY | Facility: CLINIC | Age: 30
End: 2025-01-25

## 2025-01-29 ENCOUNTER — TELEPHONE (OUTPATIENT)
Dept: ENDOCRINOLOGY | Facility: CLINIC | Age: 30
End: 2025-01-29

## 2025-01-29 ENCOUNTER — TELEPHONE (OUTPATIENT)
Dept: NEUROLOGY | Facility: CLINIC | Age: 30
End: 2025-01-29

## 2025-01-29 DIAGNOSIS — E10.65 TYPE 1 DIABETES MELLITUS WITH HYPERGLYCEMIA (HCC): ICD-10-CM

## 2025-01-29 DIAGNOSIS — E10.649 UNCONTROLLED TYPE 1 DIABETES MELLITUS WITH HYPOGLYCEMIA WITHOUT COMA (HCC): Primary | ICD-10-CM

## 2025-01-29 NOTE — TELEPHONE ENCOUNTER
Please start the P/A     [facility] calling in stating they received exception of coverage letter per . They stated they are in need of PA to be completed for Treba, that this would need to be submitted with the exception of coverage letter. Stated they faxed over request on 1/25/25 with updated information on what was needed ( please see doc. Scanned into media 1/25/25), they are asking to please review fax request and submit requested items. Please advise, thank you

## 2025-01-29 NOTE — PATIENT COMMUNICATION
[facility] calling in stating they received exception of coverage letter per . They stated they are in need of PA to be completed for Tresiba, that this would need to be submitted with the exception of coverage letter. Stated they faxed over request on 1/25/25 with updated information on what was needed ( please see doc. Scanned into media 1/25/25), they are asking to please review fax request and submit requested items. Please advise, thank you

## 2025-01-29 NOTE — TELEPHONE ENCOUNTER
CHRISTIANO confirming and reminding her of her appt 2/13/25 at 12pm with Dr VO in the [place] office. I asked she come 15mins early to appt to ensure her chart is updated as needed. I also asked her to call if she needs to reschedule or cancel.

## 2025-02-03 ENCOUNTER — TELEPHONE (OUTPATIENT)
Dept: ENDOCRINOLOGY | Facility: CLINIC | Age: 30
End: 2025-02-03

## 2025-02-03 RX ORDER — INSULIN DEGLUDEC 100 U/ML
INJECTION, SOLUTION SUBCUTANEOUS
Qty: 15 ML | Refills: 0 | Status: SHIPPED | OUTPATIENT
Start: 2025-02-03

## 2025-02-03 NOTE — TELEPHONE ENCOUNTER
Submitted to secondary with letter of coverage exception      PA for Tresiba FlexTouch 100 U SUBMITTED to [facility]    via    []CMM-KEY:   []Surescripts-Case ID #   []Availity-Auth ID # NDC #   []Faxed to plan   [x]Other website PromptPA EOC ID [id number]  []Phone call Case ID #     [x]PA sent as URGENT    All office notes, labs and other pertaining documents and studies sent. Clinical questions answered. Awaiting determination from insurance company.     Turnaround time for your insurance to make a decision on your Prior Authorization can take 7-21 business days.

## 2025-02-03 NOTE — TELEPHONE ENCOUNTER
[employer] insurance calling asking to please respond to the fax they sent ASAP in regards to additional information needed for the PA. They said they can be reached at [phone]. This PA request closes in 20 hours and will automatically be denied if information is not received.

## 2025-02-03 NOTE — TELEPHONE ENCOUNTER
CHRISTIANO confirming her appt 2/13/25 at 12pm with Dr VO in the [place] office. I asked she come 15mins early to appt to ensure her chart is updated as needed. I also asked her to call if she needs to reschedule or cancel.     Mailed appt reminder card to home address.

## 2025-02-03 NOTE — TELEPHONE ENCOUNTER
[facility] calling back in stating since generic for Tresiba, Insulin Degludec has been prescribed for patient today, that they will be cancelling PA for Tresiba name brand. If there are any further questions Sadia can be reached at [phone]. Thank you

## 2025-02-03 NOTE — TELEPHONE ENCOUNTER
Sadia from [facility] calling about this, she asks that we call them back if needing to proceed with prior auth call back number is [phone]

## 2025-02-03 NOTE — TELEPHONE ENCOUNTER
Called PT she said has not tried the generic for Tresiba Insulin Degludec but was placed on Tresiba due to her medical condition. She is willing to try.     Per insurance Company on letter was insulin glargine they advise  PT will need to try the generic Insulin Degludec (that came out about a year ago) for Tresiba     Insulin Degludec If pt has adverse reaction to this medication then a new exception can be started     Sent message to provider to see if he wants to put pt on the alt for medication

## 2025-02-03 NOTE — TELEPHONE ENCOUNTER
Called [facility] [phone] spoke to Alicia she said the Alt for Tresiba is Insulin Degludec and they did not see where PT tried this generic (she said it came out about a year ago) I asked what fax # did they sent the paperwork to and she advise looks as if it was deleted on their end and will resend the paperwork and send  message to see if can extend the deadline

## 2025-02-07 ENCOUNTER — HOSPITAL ENCOUNTER (OUTPATIENT)
Dept: INFUSION CENTER | Facility: CLINIC | Age: 30
End: 2025-02-07
Payer: COMMERCIAL

## 2025-02-07 VITALS
OXYGEN SATURATION: 98 % | BODY MASS INDEX: 26.74 KG/M2 | TEMPERATURE: 98.2 F | RESPIRATION RATE: 18 BRPM | SYSTOLIC BLOOD PRESSURE: 107 MMHG | HEART RATE: 93 BPM | DIASTOLIC BLOOD PRESSURE: 70 MMHG | WEIGHT: 145 LBS

## 2025-02-07 DIAGNOSIS — M32.8 OTHER FORMS OF SYSTEMIC LUPUS ERYTHEMATOSUS, UNSPECIFIED ORGAN INVOLVEMENT STATUS (HCC): Primary | ICD-10-CM

## 2025-02-07 PROCEDURE — 96413 CHEMO IV INFUSION 1 HR: CPT

## 2025-02-07 RX ORDER — SODIUM CHLORIDE 9 MG/ML
20 INJECTION, SOLUTION INTRAVENOUS ONCE
OUTPATIENT
Start: 2025-03-07

## 2025-02-07 RX ORDER — ACETAMINOPHEN 325 MG/1
650 TABLET ORAL ONCE
OUTPATIENT
Start: 2025-03-07

## 2025-02-07 RX ORDER — ACETAMINOPHEN 325 MG/1
650 TABLET ORAL ONCE
Status: COMPLETED | OUTPATIENT
Start: 2025-02-07 | End: 2025-02-07

## 2025-02-07 RX ORDER — SODIUM CHLORIDE 9 MG/ML
20 INJECTION, SOLUTION INTRAVENOUS ONCE
Status: COMPLETED | OUTPATIENT
Start: 2025-02-07 | End: 2025-02-07

## 2025-02-07 RX ORDER — DIPHENHYDRAMINE HCL 25 MG
25 TABLET ORAL ONCE
Status: COMPLETED | OUTPATIENT
Start: 2025-02-07 | End: 2025-02-07

## 2025-02-07 RX ORDER — DIPHENHYDRAMINE HCL 25 MG
25 TABLET ORAL ONCE
OUTPATIENT
Start: 2025-03-07

## 2025-02-07 RX ADMIN — ACETAMINOPHEN 650 MG: 325 TABLET ORAL at 14:02

## 2025-02-07 RX ADMIN — SODIUM CHLORIDE 20 ML/HR: 0.9 INJECTION, SOLUTION INTRAVENOUS at 13:59

## 2025-02-07 RX ADMIN — DIPHENHYDRAMINE HYDROCHLORIDE 25 MG: 25 TABLET ORAL at 14:02

## 2025-02-07 RX ADMIN — BELIMUMAB 640 MG: 400 INJECTION, POWDER, LYOPHILIZED, FOR SOLUTION INTRAVENOUS at 14:48

## 2025-02-07 NOTE — PROGRESS NOTES
Tolerated Benlysta without issue. Pt has next appt scheduled but states she needs to reschedule and will do that prior to leaving. AVS declined.

## 2025-02-07 NOTE — LETTER
[facility]  [address]  [address]  [place] [id number]  Dept: [phone]    February 7, 2025     Patient: Jenny Rodrigues   YOB: 1995   Date of Visit: 2/7/2025       To Whom it May Concern:    Jenny Rodrigues is under my professional care. She was seen in the hospital from 2/7/2025 to 02/07/25. .    If you have any questions or concerns, please don't hesitate to call.         Sincerely,          Esther Woodson RN

## 2025-02-07 NOTE — PROGRESS NOTES
Pt arrives to infusion center for Benlysta. Offers no complaints, recent infection or illness or current antibiotic use. PIV accessed without issue. Pt sitting comfortably in chair, wheels locked, call bell within reach.

## 2025-02-11 PROBLEM — M32.9 SYSTEMIC LUPUS ERYTHEMATOSUS (HCC): Status: ACTIVE | Noted: 2023-09-15

## 2025-02-12 ENCOUNTER — OFFICE VISIT (OUTPATIENT)
Dept: RHEUMATOLOGY | Facility: CLINIC | Age: 30
End: 2025-02-12

## 2025-02-12 VITALS
HEIGHT: 62 IN | SYSTOLIC BLOOD PRESSURE: 110 MMHG | BODY MASS INDEX: 26.31 KG/M2 | WEIGHT: 143 LBS | HEART RATE: 92 BPM | DIASTOLIC BLOOD PRESSURE: 62 MMHG | OXYGEN SATURATION: 99 %

## 2025-02-12 DIAGNOSIS — M32.9 SYSTEMIC LUPUS ERYTHEMATOSUS, UNSPECIFIED SLE TYPE, UNSPECIFIED ORGAN INVOLVEMENT STATUS (HCC): Primary | ICD-10-CM

## 2025-02-12 DIAGNOSIS — Z79.631 METHOTREXATE, LONG TERM, CURRENT USE: ICD-10-CM

## 2025-02-12 DIAGNOSIS — E06.3 HASHIMOTO'S DISEASE: ICD-10-CM

## 2025-02-12 DIAGNOSIS — Z79.899 LONG-TERM USE OF PLAQUENIL: ICD-10-CM

## 2025-02-12 DIAGNOSIS — E10.9 TYPE 1 DIABETES MELLITUS WITHOUT COMPLICATION (HCC): ICD-10-CM

## 2025-02-12 DIAGNOSIS — Z79.60 LONG-TERM USE OF IMMUNOSUPPRESSANT MEDICATION: ICD-10-CM

## 2025-02-12 DIAGNOSIS — M79.7 FIBROMYALGIA: ICD-10-CM

## 2025-02-12 RX ORDER — HYDROXYCHLOROQUINE SULFATE 200 MG/1
TABLET, FILM COATED ORAL
Qty: 180 TABLET | Refills: 3 | Status: SHIPPED | OUTPATIENT
Start: 2025-02-12 | End: 2026-02-12

## 2025-02-12 NOTE — PROGRESS NOTES
Ambulatory Visit  Name: Jenny Rodrigues      : 1995      MRN: [id number]  Encounter Provider: Chelle Tanner MD  Encounter Date: 2025   Encounter department: [facility] [place]    Assessment & Plan  Systemic lupus erythematosus, unspecified SLE type, unspecified organ involvement status (HCC)  Ms. Rodrigues is a 30-year-old female with history significant for type 1 insulin-dependent diabetes mellitus diagnosed in  and Hashimoto's thyroiditis diagnosed in  who presents for a follow-up of an undifferentiated connective tissue disorder (diagnosed based on a positive SOHAM 1:80 nuclear, speckled, homogeneous patterns, arthralgias, malar rash/photosensitivity)/systemic lupus.  She is currently on hydroxychloroquine 200 mg twice daily on the weekdays and once daily on the weekend that was started in 2021, injectable methotrexate 20 mg once weekly and belimumab infusions 10 mg/kg every 4 weeks that was added in 2023.     # Undifferentiated connective tissue disease/systemic lupus  - Jenny presents today for a follow-up of an undifferentiated connective tissue disease/systemic lupus for which she is currently on hydroxychloroquine 200 mg twice daily on the weekdays and 200 mg once daily on the weekend, subcutaneous methotrexate 20 mg once weekly and belimumab infusions on a monthly basis.  She has overall been stable, but still experiences chronic joint issues which periodically flareup and may have been related to her recent infection in .  She will continue to manage this with gabapentin 600 mg nightly, Tylenol 500 mg twice daily and naproxen 220 milligram once daily as needed.  I do not see an indication to change her DMARDs currently.  She is due to update her monitoring labs to assess for drug toxicity prior to the follow up and will see ophthalmology for bi-annual eye exams.     - Of note she is aware that methotrexate is teratogenic and I recommend  discontinuing this 3 months prior to planning conception.  We also discussed there is insufficient data on the safety of belimumab during pregnancy and around the time of pregnancy planning we will need to have a discussion regarding the risks and benefits.  In the meanwhile she will continue with dual contraception including birth control pills and barrier contraception.      - In regards to the right hip pain I do not suspect this to be related to a rheumatic issue - there is no evidence of an inflammatory arthritis on the recent MRI of the hip and her symptoms did not improve with immunosuppressive agents.  She can continue follow up with Orthopedics to determine the next steps in management.  An intra-articular non-steroidal medication can be considered for short term relief if she prefers to delay surgery.  If surgery is considered we will need to review use of her immunosuppressive medications in the perioperative period.     Orders:    CBC and differential; Future    Comprehensive metabolic panel; Future    C-reactive protein; Future    Sedimentation rate, automated; Future    C4 complement; Future    C3 complement; Future    Anti-DNA antibody, double-stranded; Future    Urinalysis with microscopic; Future    Protein / creatinine ratio, urine; Future    hydroxychloroquine (PLAQUENIL) 200 mg tablet; Take 1 tab twice daily on M-F and 1 tab once daily on the weekend.    Methotrexate, long term, current use         Long-term use of Plaquenil         Long-term use of immunosuppressant medication         Hashimoto's disease         Type 1 diabetes mellitus without complication (HCC)    Lab Results   Component Value Date    HGBA1C 9.0 (H) 12/04/2024            Fibromyalgia           Patient's rheumatologic disease(s) threaten long-term function if not appropriately treated.      I have spent a total time of 40 minutes in caring for this patient on the day of the visit/encounter including Diagnostic results, Risks  and benefits of tx options, Instructions for management, Patient and family education, Impressions, Documenting in the medical record, Reviewing / ordering tests, medicine, procedures  , and Obtaining or reviewing history  .      History of Present Illness       INITIAL VISIT NOTE (6/2021):  Ms. Rodrigues is a 26-year-old female with history significant for type 1 insulin-dependent diabetes mellitus diagnosed in 2019 and Hashimoto's thyroiditis (elevated thyroid microsomal and thyroglobulin antibodies) diagnosed in 2020, who presents for further evaluation of a positive SOHAM 1:80 nuclear, speckled, homogeneous patterns and rheumatoid factor of 10, in addition to widespread joint pains.  She is referred by MARGARET Sparks for a rheumatology consult.       Patient reports she started to feel unwell approximately 2 years ago and further evaluation for this led to the diagnosis of type 1 diabetes as well as the Hashimoto's thyroiditis.  She has been managing the diabetes with insulin and states for the hypothyroidism as a result of Hashimoto's thyroiditis she was only started on levothyroxine 25 mcg once daily approximately 1 month ago.  There has been a conflict between the endocrinologists she has seen in regards to starting the levothyroxine, but due to progressive complaints which the patient and her primary care physician felt was related to hypothyroidism she was finally started on thyroid supplementation 1 month ago.  She has not noticed a change in her symptoms so far and has not had a TSH rechecked yet.  The TSH was slightly elevated at 5.24 on 5/1 prior to starting the levothyroxine.  She is also scheduled to see a private endocrinologist for another opinion.     She reports over the past 1 and half years she has also started to experience joint and muscle pain which has been gradually progressive.  She experiences a degree of pain on a daily basis but states that her symptoms can spontaneously flare-up as  well as with changes in the weather, especially when it is cold/damp/humid.  She does feel better with the warmer weather and has also started utilizing a therapy pool which does help with her symptoms.  She has noticed joint pains to affect her hands occasionally but prominently in her wrists.  She will notice pain in her shoulders and hips mostly with manipulation and range of motion.  She describes chronic pain that will also affect her knees as well as in her ankles and feet.  At times she will notice a muscle pain throughout her upper and lower extremities as well.  She has noticed muscle cramps to affect her hands and feet.  No significant joint pains in her elbows or low back.  She has not noticed any obvious swelling of her wrists but feels like they may be swollen as she can gauge this by her bracelet.  She has also noticed swelling to affect her ankles.  She does experience morning stiffness which affects her diffusely and takes about 30-40 minutes to improve.  She tries to avoid Tylenol as this affects her blood glucose monitoring.  She has tried ibuprofen for her symptoms which has not helped significantly.     Other than the body pain she also describes chronic fatigue, unintentional weight gain and hair thinning.  She denies fevers, unintentional weight loss, focal alopecia, dry eyes, dry mouth, inflammatory eye disease, skin rash, psoriasis, photosensitivity, mouth/nose ulcers, swollen glands, pleuritic chest pain, shortness of breath, inflammatory bowel disease, blood clots (reports a history of 1 very early miscarriage), Raynaud's or a family history of autoimmune disease.     Testing done for her symptoms showed the positive SOHAM and borderline positive rheumatoid factor.  A rheumatoid factor was negative in 2019.  An ESR, CRP, anti CCP antibody, CK, Lyme antibody profile, anti neutrophilic cytoplasmic antibodies, Sjogren's antibodies and anti double-stranded DNA antibody were normal.  An MRI of her  right knee and ultrasound of her right Achilles tendon in 2018 were normal.     In view of her complaints she was also evaluated by Neurology to rule out multiple sclerosis and currently there is no specific diagnosis from their perspective.  She did have an MRI of her brain done last year which showed a single focus of white matter change which has been stable since 2014.        7/13/2021:  Patient presents for a follow-up of a positive SOHAM.  I reviewed her workup done following the last office visit which showed an unremarkable SOHAM specificity, C3, C4, antiphospholipid antibody testing, urinalysis, urine protein creatinine ratio, vitamin-D, CK, anti CCP antibody and HLA B27 antigen.  X-rays of her hands and feet were unremarkable.       She reports there has been no change in her symptoms since the last office visit and she continues to describe the chronic fatigue, muscle aches/spasms as well as ongoing joint pains.  She is scheduled for another endocrinology opinion tomorrow to see if any of her symptoms may be related to the underlying hypothyroidism, but this is not felt to be the case so far.        2/16/2022:  Patient presents for follow-up of an undifferentiated connective tissue disease.  She is currently on hydroxychloroquine 200 mg twice daily that was started in July 2021.       She reports in about September she started to notice a significant improvement in her well-being with being on the hydroxychloroquine.  There was an improvement in her fatigue and joint pains.  She had overall been doing well up until January but after she received the COVID-19 booster vaccination noticed a flare-up in her symptoms with more fatigue as well as significant joint pains which mostly affected her hands, wrists, knees and ankles.  She has been noticing intermittent swelling of her fingers as well as ankles/feet.  She has been taking Tylenol alternating with ibuprofen but is unsure if the medications are specifically  helping her or if the side effects as a result of the vaccination are gradually improving on their own.  She was also evaluated by Gastroenterology and had an upper endoscopy done which showed chronic gastritis so she was advised to minimize NSAID use.  No recent steroids.     Except for the pain she has also had a few occurrences of redness on her face in a butterfly pattern as well as affecting her forehead.  She has noticed photosensitivity and states while she was at the beach in August she developed a skin rash in sun-exposed areas.  She has been more careful with applying sunscreen as well as covering up in the sun.  She denies fevers, weight loss, alopecia, mouth/nose ulcers, swollen glands or pleuritic chest pain.        6/22/2022:  Patient presents for follow-up of an undifferentiated connective tissue disease.  She is currently on hydroxychloroquine 200 mg twice daily that was started in July 2021.  I reviewed her blood work from February which showed a normal CBC, CMP, ESR, CRP, C3, C4, double-stranded DNA antibody, urinalysis and urine protein creatinine ratio.     She reports overall since the last office visit she has been fairly stable except for an episode of pericarditis following the COVID-19 booster vaccination in January.  She was seen by Cardiology and prescribed colchicine to take for 3 months.  She reports the symptoms have mostly resolved and only on occasion will she notice some chest pain which is not long-lasting.  She reports with sun exposure she will start to notice more joint pains and fatigue.  For the most part she deals with some degree of joint pain on a daily basis and still notices swelling affecting her hands and ankles.  She will be starting occupational therapy as due to the joint pain in her hands she has started to feel weakness in her hand strength.  No fevers, unintentional weight loss, skin rashes or mouth/nose ulcers.     Although she still endorses symptoms she reports  overall she has been feeling much better since starting the hydroxychloroquine.        10/27/2022:  Patient presents for a follow-up of an undifferentiated connective disease.  She is currently on hydroxychloroquine 200 mg twice daily that was started in July 2021 and oral methotrexate 15 mg once weekly that was started in June 2022.  I reviewed her recent labs which showed an unremarkable CBC, CMP, ESR, CRP, C3, C4, double-stranded DNA antibody and urine protein creatinine ratio.       She reports she has been doing well without any joint pains and the methotrexate really seems to have helped her.  With initially starting it she did notice numbness in her hand and legs for which she was seen in the emergency department but as it was unclear if this was related to the methotrexate I requested she resume it and the symptoms have not been consistent.  No skin rashes or mouth/nose ulcers.     She does report chronic symptoms of abdominal pain, nausea, vomiting and greasy stools for which she has been following with Gastroenterology.  A CT abdomen was unremarkable.  She is scheduled for a HIDA scan.        3/2/2023:  Patient presents for a follow-up of an undifferentiated connective disease.  She is currently on hydroxychloroquine 200 mg twice daily that was started in July 2021 and oral methotrexate 20 mg once weekly that was started in June 2022.  I reviewed her recent labs which showed an unremarkable CBC, CMP, ESR, CRP, C3, C4, double-stranded DNA antibody and urine protein creatinine ratio.       She has been doing well since the last office visit and denies any complaints today.  No symptoms such as true fevers, unintentional weight loss, alopecia, skin rashes, mouth/nose ulcers, swollen glands, pleuritic chest pain or joint pain/swelling/stiffness.     She has been tolerating her medications well and is up-to-date with her annual eye exams but reports since December she has been dealing with recurrent infections.   She took a while to recover from COVID and recently was diagnosed with back to back sinus infections requiring antibiotic use.        9/11/2023:  Patient presents for a follow-up of an undifferentiated connective disease.  She is currently on hydroxychloroquine 200 mg twice daily on the weekdays and once daily on the weekend that was started in July 2021 and oral methotrexate 20 mg once weekly that was started in June 2022.  I reviewed her recent labs which showed an unremarkable CBC, CMP, ESR, CRP, C3, double-stranded DNA antibody and urine protein creatinine ratio.  A C4 complement was slightly decreased at 16.     Unfortunately since the last visit she has been feeling unwell with increased fatigue and widespread body pain that has been flaring up.  This is leading to a significant impact on her mental health.  She reports especially over the past month she has been in a constant flare.  Prior to this she did have an infection for which she was on antibiotics and held the injectable methotrexate for 1 week.  Initially she was trying to manage through the symptoms but did contact me on August 31 at which time I prescribed her prednisone 20 mg once daily for 7 days which did help.  The steroids do raise her blood sugars up to the 400s but she has an insulin pump which will manage the elevated blood sugars.  After completing the steroids she is still not feeling well and is very upset and tearful at today's visit.  Apart from the joint pain she has also been noticing swelling that can primarily affect her hands, wrists, knees and ankles.  The steroids did help with the joint swelling.  She has noticed occurrence of a skin rash on her face as well.     She has been tolerating the hydroxychloroquine and methotrexate well overall but has been dealing with recurrent infections which was present even prior to the initiation of methotrexate.  She is following with immunology to determine if she may have an underlying  immunodeficiency.  She is up-to-date with her annual eye exams.        12/18/2023:  Patient presents for a follow-up of an undifferentiated connective disease.  She is currently on hydroxychloroquine 200 mg twice daily on the weekdays and once daily on the weekend that was started in July 2021, injectable methotrexate 20 mg once weekly and Benlysta infusions 10 mg/kg every 4 weeks that was added after the last visit.  I reviewed her recent labs which showed a slightly decreased C4 complement of 13.  A CBC, CMP, ESR, CRP, C3, double-stranded DNA antibody, urine analysis and urine protein creatinine ratio were unremarkable.     She has completed the Benlysta infusions and received her first maintenance infusion recently.  She notes that it has helped with the facial rash but so far no significant impact on the fatigue and joint pains which she is still symptomatic with.  She avoids Tylenol and NSAIDs as this causes stomach upset.  She is currently on gabapentin 300 mg nightly per neurology for lower extremity neuropathy but states the medication does not help with the neuropathic complaints or with joint pains.     She has been tolerating the hydroxychloroquine, methotrexate and Benlysta well without any concerning side effects.  She is up-to-date with her annual eye exams.        6/12/2024:  Patient presents for a follow-up of an undifferentiated connective disease.  She is currently on hydroxychloroquine 200 mg twice daily on the weekdays and once daily on the weekend that was started in July 2021, injectable methotrexate 20 mg once weekly and Benlysta infusions 10 mg/kg every 4 weeks.  She is due to update her full panel of lupus related labs.     She reports with consistent use of the Benlysta she has noticed a significant improvement in her pain levels and she is currently denying any concerning joint pains, swelling or stiffness.  No unexplained fevers, unintentional weight loss, focal alopecia, mouth/nose  ulcers, swollen glands or pleuritic chest pain that she describes.  Recently she has noticed an itchy, raised skin rash to occur on her bilateral arms and abdomen which has been occurring on a mostly nightly basis.  She is wondering if this could be related to an allergic exposure.  She has been using Benadryl as needed which helps.  She has previously seen an allergist and had testing done which was unremarkable.     She mentions since her childhood she has had issues with recurrent upper respiratory tract infections.  This pattern seems to be continuing as she had a prolonged sinus infection from February till the end of March and was diagnosed with recurrent upper respiratory tract infections twice in May.  She has been able to manage this with outpatient antibiotics but states anytime she has an infection her blood sugars significantly elevate and she has been hospitalized for diabetic ketoacidosis.     She has been tolerating the hydroxychloroquine, methotrexate and Benlysta well without any concerning side effects, other than possible infection risk.  She is up-to-date with her annual eye exams.      10/7/2024:  Patient presents for a follow-up of an undifferentiated connective disease/systemic lupus.  She is currently on hydroxychloroquine 200 mg twice daily on the weekdays and once daily on the weekend that was started in July 2021, injectable methotrexate 20 mg once weekly and Benlysta infusions 10 mg/kg every 4 weeks.  I reviewed her recent labs which shows an unremarkable CBC, CMP, ESR, CRP [on repeat check it normalized], C3, C4, double-stranded DNA antibody, urine analysis and urine protein creatinine ratio.    She has overall been stable, but since the last office visit for the past few months has been dealing with right hip/groin region pain.  She did have an MRI done which ruled out avascular necrosis and showed possible impingement.  She needs to schedule a follow-up with orthopedics.  She has been  taking Tylenol and ibuprofen to help manage the pain.  Otherwise no consistent joint pains, swelling or stiffness.  With exposure to the sun she may develop a skin rash and has recently noticed dryness on her upper arms which was also sun exposed.  She does also have a mild degree of keratosis pilaris.  No fevers, unintentional weight loss, focal alopecia, mouth/nose ulcers, swollen glands or pleuritic chest pain.    She has been tolerating the hydroxychloroquine, methotrexate and Benlysta well without any concerning side effects.  She is up-to-date with her bi-annual eye exams.  She is seeing a retinal specialist at [facility] for a retinal tuft, but this is not felt to be secondary to the hydroxychloroquine.  No diabetic retinopathy.       2/12/2025:  Patient presents for a follow-up of an undifferentiated connective disease/systemic lupus.  She is currently on hydroxychloroquine 200 mg twice daily on the weekdays and once daily on the weekend that was started in July 2021, injectable methotrexate 20 mg once weekly and Benlysta infusions 10 mg/kg every 4 weeks.  I reviewed her recent labs which shows a minimally elevated ESR and CRP of 23 and 6.1, respectively.  A CBC, CMP, C3, C4, double-stranded DNA antibody, urine analysis and urine protein creatinine ratio were unremarkable.    She mentions in December she had an infection and during this time she noticed her ankles to swell.  Since then her joint pains have not fully recovered and she is still noticing pain in her hands, wrists, ankles and feet.  No prominent swelling recently, but her ankles still swell from time to time.  She does experience morning stiffness in the same joints which starts to improve with activities.  She is taking Tylenol 500 mg twice daily and naproxen 220 mg once daily to help with her symptoms, and this was primarily started for the right hip pain.  She was advised to discuss with me if the right hip pain may be secondary to a rheumatic  issue prior to orthopedics determining if she is a surgical candidate.  Overall her joint pains are manageable.  At times she has also noticed red, flat patches on her arms.  No fevers, unintentional weight loss, focal alopecia, mouth/nose ulcers, swollen glands or pleuritic chest pain.    She has been tolerating the hydroxychloroquine, methotrexate and Benlysta well without any concerning side effects.  She is up-to-date with her eye exams.       Review of Systems  Constitutional: Negative for weight change, fevers, chills, night sweats, fatigue.  ENT/Mouth: Negative for hearing changes, ear pain, nasal congestion, sinus pain, hoarseness, sore throat, rhinorrhea, swallowing difficulty.   Eyes: Negative for pain, redness, discharge, vision changes.   Cardiovascular: Negative for chest pain, SOB, palpitations.   Respiratory: Negative for cough, sputum, wheezing, dyspnea.   Gastrointestinal: Negative for nausea, vomiting, diarrhea, constipation, pain, heartburn.  Genitourinary: Negative for dysuria, urinary frequency, hematuria.   Musculoskeletal: As per HPI.  Skin: Negative for skin rash, color changes.   Neuro: Negative for weakness, numbness, tingling, loss of consciousness.   Psych: Negative for anxiety, depression.   Heme/Lymph: Negative for easy bruising, bleeding, lymphadenopathy.      Past Medical History   Past Medical History:   Diagnosis Date    Abdominal pain     Anaphylaxis     Anxiety     Anxiety and depression     COVID-19 12/2020    Delayed emergence from anesthesia 03/23/2023    Severe episode of emergence delirium (confused, combative) after MAC anesthetic on 03/2023 for EGD. Received versed 2mg, >50mcg precedex, 5mg IV haldol, and 50mcg fentanyl. Waxing and waning episodes of agitation. Any future anesthetics should NOT be done at [facility].    Delirium, induced by drug     anesthesia    Depression     Diabetes mellitus (HCC)     Disease of thyroid gland     Hashimoto    DKA, type 1 (HCC) 05/11/2021     "Eating disorder     history of anorexia/bulemia [id number]    Fatigue     Food intolerance     Fracture of fibula     R Salter I    Gilbert disease     Hashimoto's disease 02/21/2020    Head injury     Headache(784.0)     Migraine     Nasal congestion     PTSD (post-traumatic stress disorder)     Rectal bleed     Seizures (HCC)     Type 1 diabetes (HCC)      Past Surgical History:   Procedure Laterality Date    COLONOSCOPY      EGD  03/23/2023    KNEE SURGERY Right 07/06/2020    NASAL SEPTOPLASTY W/ TURBINOPLASTY      SINUS SURGERY      SKIN BIOPSY      TURBINOPLASTY N/A 03/22/2021    Procedure: TURBINOPLASTY;  Surgeon: Jn Hidalgo MD;  Location: [facility];  Service: ENT    UPPER GASTROINTESTINAL ENDOSCOPY      WISDOM TOOTH EXTRACTION      WRIST SURGERY      left; Excision of ganglion     Family History   Problem Relation Age of Onset    Hypertension Mother     Migraines Mother         Headache    Diabetes type II Mother     Varicose Veins Mother     Hyperlipidemia Mother     Diabetes Mother     Arthritis Mother     Depression Mother     Hearing loss Mother     Anxiety disorder Mother     Eczema Father     Cholelithiasis Father     Hypertension Father     Sarcoidosis Father         Liver    Hyperlipidemia Father     Diabetes Father     Coronary artery disease Father     Nephrolithiasis Father     Cirrhosis Father     Alcohol abuse Father     Thyroid disease Sister     Hashimoto's thyroiditis Sister     Alcohol abuse Brother     Cancer Family     Diabetes Family     Hypertension Family     Thyroid disease Sister      Current Outpatient Medications on File Prior to Visit   Medication Sig Dispense Refill    acetaminophen (TYLENOL) 325 mg tablet Take 3 tablets (975 mg total) by mouth every 8 (eight) hours as needed for mild pain or headaches      Atogepant (Qulipta) 10 MG TABS 1 tab daily. 90 tablet 0    BD Insulin Syringe U/F 31G X 5/16\" 0.5 ML MISC Use as directly with weekly methotrexate injection. 100 each 0 "    Belimumab (BENLYSTA IV) Inject into a catheter in a vein every month to absorb continually Switching to monthly on 12/5      Cyanocobalamin 1000 MCG SUBL Place 1 tablet (1,000 mcg total) under the tongue daily 90 tablet 0    EPINEPHrine (EPIPEN) 0.3 mg/0.3 mL SOAJ Inject 0.3 mL (0.3 mg total) into a muscle once for 1 dose 0.6 mL 0    famotidine (PEPCID) 40 MG tablet Take 1 tablet (40 mg total) by mouth daily at bedtime 90 tablet 3    fluticasone (FLONASE) 50 mcg/act nasal spray SPRAY 1 SPRAY INTO EACH NOSTRIL EVERY DAY 48 mL 2    folic acid (FOLVITE) 1 mg tablet TAKE 1 TABLET BY MOUTH EVERY DAY 90 tablet 3    gabapentin (Neurontin) 600 MG tablet Take 1 tablet (600 mg total) by mouth daily 90 tablet 1    Glucagon (Gvoke HypoPen 2-Pack) 1 MG/0.2ML SOAJ 1 mg PRN for severe hypoglycemia 0.4 mL 0    Insulin Degludec FlexTouch 100 UNIT/ML SOPN 32 units daily 15 mL 0    Insulin Infusion Pump (T:slim X2 Ins Pump/Control-IQ) ANURAG Use      Insulin Pen Needle (BD PEN NEEDLE NASIM U/F) 32G X 4 MM MISC by Does not apply route 4 (four) times a day for 180 days 400 each 3    ketorolac (TORADOL) 10 mg tablet Take 1 tablet (10 mg total) by mouth every 6 (six) hours as needed (migraine) Max 2-3 per week. 10 tablet 0    levonorgestrel-ethinyl estradiol (Altavera) 0.15-30 MG-MCG per tablet TAKE 1 TABLET BY MOUTH EVERY DAY 84 tablet 1    levothyroxine 25 mcg tablet Take 1 tablet (25 mcg total) by mouth daily 90 tablet 3    LORazepam (ATIVAN PO) Take by mouth daily as needed      metFORMIN (GLUCOPHAGE-XR) 500 mg 24 hr tablet Take 2 tablets (1,000 mg total) by mouth 2 (two) times a day with meals 360 tablet 2    methotrexate 50 MG/2ML injection INJECT 0.8 ML UNDER THE SKIN ONCE EVERY 7 DAYS. DISCARD UNUSED REMAINDER 30 DAYS AFTER INITIAL USE 10 mL 3    Naproxen Sodium (ALEVE PO) Take by mouth daily as needed      NovoLOG 100 UNIT/ML injection Up to 100 units per day with insulin pump 90 mL 3    pantoprazole (PROTONIX) 40 mg tablet TAKE  1 TABLET BY MOUTH EVERY DAY 90 tablet 1    promethazine (PHENERGAN) 25 mg tablet Take 1 tablet (25 mg total) by mouth every 6 (six) hours as needed for nausea or vomiting for up to 3 days 12 tablet 0    rizatriptan (MAXALT-MLT) 10 mg disintegrating tablet 1 tab at migraine onset, repeat after 2 hours if needed; Max 2 per day and 3 per week. (Patient taking differently: as needed 1 tab at migraine onset, repeat after 2 hours if needed; Max 2 per day and 3 per week.) 9 tablet 2    Tresiba FlexTouch 100 units/mL injection pen Inject 32 Units under the skin daily 15 mL 2    [DISCONTINUED] hydroxychloroquine (PLAQUENIL) 200 mg tablet Take 1 tablet (200 mg total) by mouth 2 (two) times a day with meals 180 tablet 3    albuterol (ProAir HFA) 90 mcg/act inhaler Inhale 2 puffs every 6 (six) hours as needed for wheezing or shortness of breath (Patient not taking: Reported on 2/12/2025) 8.5 g 0    benzonatate (TESSALON) 200 MG capsule Take 1 capsule (200 mg total) by mouth 3 (three) times a day as needed for cough (Patient not taking: Reported on 2/12/2025) 20 capsule 0    clindamycin (CLEOCIN T) 1 % lotion Apply 1 application. topically 2 (two) times a day (Patient not taking: Reported on 2/12/2025)      Galcanezumab-gnlm (Emgality) 120 MG/ML SOAJ One ml subcutaneous on the right thigh and 1 ml subcutaneous on the left thigh at the same time for 1 dose (Patient not taking: Reported on 10/14/2024) 2 mL 0    Galcanezumab-gnlm (Emgality) 120 MG/ML SOAJ 30 days after loading dose, inject 1 pen subq every 30 days. (Patient not taking: Reported on 10/14/2024) 1 mL 11    Glucagon HCl (Glucagon Emergency) 1 MG/ML SOLR  (Patient not taking: Reported on 12/23/2024)      sucralfate (CARAFATE) 1 g tablet Take 1 tablet (1 g total) by mouth 4 (four) times a day (Patient not taking: Reported on 2/12/2025) 40 tablet 0     No current facility-administered medications on file prior to visit.     Allergies   Allergen Reactions     "Shellfish-Derived Products - Food Allergy Anaphylaxis     Octopus, sea snails, claims, etc (ask patient)    Insulin Glargine Other (See Comments)     LANTUS BRAND -Burning and redness under skin       Current Outpatient Medications on File Prior to Visit   Medication Sig Dispense Refill    acetaminophen (TYLENOL) 325 mg tablet Take 3 tablets (975 mg total) by mouth every 8 (eight) hours as needed for mild pain or headaches      Atogepant (Qulipta) 10 MG TABS 1 tab daily. 90 tablet 0    BD Insulin Syringe U/F 31G X 5/16\" 0.5 ML MISC Use as directly with weekly methotrexate injection. 100 each 0    Belimumab (BENLYSTA IV) Inject into a catheter in a vein every month to absorb continually Switching to monthly on 12/5      Cyanocobalamin 1000 MCG SUBL Place 1 tablet (1,000 mcg total) under the tongue daily 90 tablet 0    EPINEPHrine (EPIPEN) 0.3 mg/0.3 mL SOAJ Inject 0.3 mL (0.3 mg total) into a muscle once for 1 dose 0.6 mL 0    famotidine (PEPCID) 40 MG tablet Take 1 tablet (40 mg total) by mouth daily at bedtime 90 tablet 3    fluticasone (FLONASE) 50 mcg/act nasal spray SPRAY 1 SPRAY INTO EACH NOSTRIL EVERY DAY 48 mL 2    folic acid (FOLVITE) 1 mg tablet TAKE 1 TABLET BY MOUTH EVERY DAY 90 tablet 3    gabapentin (Neurontin) 600 MG tablet Take 1 tablet (600 mg total) by mouth daily 90 tablet 1    Glucagon (Gvoke HypoPen 2-Pack) 1 MG/0.2ML SOAJ 1 mg PRN for severe hypoglycemia 0.4 mL 0    Insulin Degludec FlexTouch 100 UNIT/ML SOPN 32 units daily 15 mL 0    Insulin Infusion Pump (T:slim X2 Ins Pump/Control-IQ) ANURAG Use      Insulin Pen Needle (BD PEN NEEDLE NASIM U/F) 32G X 4 MM MISC by Does not apply route 4 (four) times a day for 180 days 400 each 3    ketorolac (TORADOL) 10 mg tablet Take 1 tablet (10 mg total) by mouth every 6 (six) hours as needed (migraine) Max 2-3 per week. 10 tablet 0    levonorgestrel-ethinyl estradiol (Altavera) 0.15-30 MG-MCG per tablet TAKE 1 TABLET BY MOUTH EVERY DAY 84 tablet 1    " levothyroxine 25 mcg tablet Take 1 tablet (25 mcg total) by mouth daily 90 tablet 3    LORazepam (ATIVAN PO) Take by mouth daily as needed      metFORMIN (GLUCOPHAGE-XR) 500 mg 24 hr tablet Take 2 tablets (1,000 mg total) by mouth 2 (two) times a day with meals 360 tablet 2    methotrexate 50 MG/2ML injection INJECT 0.8 ML UNDER THE SKIN ONCE EVERY 7 DAYS. DISCARD UNUSED REMAINDER 30 DAYS AFTER INITIAL USE 10 mL 3    Naproxen Sodium (ALEVE PO) Take by mouth daily as needed      NovoLOG 100 UNIT/ML injection Up to 100 units per day with insulin pump 90 mL 3    pantoprazole (PROTONIX) 40 mg tablet TAKE 1 TABLET BY MOUTH EVERY DAY 90 tablet 1    promethazine (PHENERGAN) 25 mg tablet Take 1 tablet (25 mg total) by mouth every 6 (six) hours as needed for nausea or vomiting for up to 3 days 12 tablet 0    rizatriptan (MAXALT-MLT) 10 mg disintegrating tablet 1 tab at migraine onset, repeat after 2 hours if needed; Max 2 per day and 3 per week. (Patient taking differently: as needed 1 tab at migraine onset, repeat after 2 hours if needed; Max 2 per day and 3 per week.) 9 tablet 2    Tresiba FlexTouch 100 units/mL injection pen Inject 32 Units under the skin daily 15 mL 2    [DISCONTINUED] hydroxychloroquine (PLAQUENIL) 200 mg tablet Take 1 tablet (200 mg total) by mouth 2 (two) times a day with meals 180 tablet 3    albuterol (ProAir HFA) 90 mcg/act inhaler Inhale 2 puffs every 6 (six) hours as needed for wheezing or shortness of breath (Patient not taking: Reported on 2/12/2025) 8.5 g 0    benzonatate (TESSALON) 200 MG capsule Take 1 capsule (200 mg total) by mouth 3 (three) times a day as needed for cough (Patient not taking: Reported on 2/12/2025) 20 capsule 0    clindamycin (CLEOCIN T) 1 % lotion Apply 1 application. topically 2 (two) times a day (Patient not taking: Reported on 2/12/2025)      Galcanezumab-gnlm (Emgality) 120 MG/ML SOAJ One ml subcutaneous on the right thigh and 1 ml subcutaneous on the left thigh at  "the same time for 1 dose (Patient not taking: Reported on 10/14/2024) 2 mL 0    Galcanezumab-gnlm (Emgality) 120 MG/ML SOAJ 30 days after loading dose, inject 1 pen subq every 30 days. (Patient not taking: Reported on 10/14/2024) 1 mL 11    Glucagon HCl (Glucagon Emergency) 1 MG/ML SOLR  (Patient not taking: Reported on 12/23/2024)      sucralfate (CARAFATE) 1 g tablet Take 1 tablet (1 g total) by mouth 4 (four) times a day (Patient not taking: Reported on 2/12/2025) 40 tablet 0     No current facility-administered medications on file prior to visit.      Social History     Tobacco Use    Smoking status: Never     Passive exposure: Never    Smokeless tobacco: Never    Tobacco comments:     Tobacco smoke exposure (Father smokes cigars)   Vaping Use    Vaping status: Never Used   Substance and Sexual Activity    Alcohol use: Not Currently     Comment: rarely    Drug use: No    Sexual activity: Yes     Partners: Male     Birth control/protection: OCP         Objective     /62 (BP Location: Left arm)   Pulse 92   Ht 5' 1.75\" (1.568 m)   Wt 64.9 kg (143 lb)   SpO2 99%   BMI 26.37 kg/m²     Physical Exam  General: Well appearing, well nourished, in no distress. Oriented x 3, normal mood and affect.  Ambulating without difficulty.  Skin: Good turgor, no rash, unusual bruising or prominent lesions.   Hair: Normal texture and distribution.  Nails: Normal color, no deformities.  HEENT:  Head: Normocephalic, atraumatic.  Eyes: Conjunctiva clear, sclera non-icteric, EOM intact.  Nose: No external lesions.  Neck: Supple.  Extremities: No amputations or deformities, cyanosis, edema.  Musculoskeletal:   There is no soft tissue swelling or prominent tenderness noted on her exam today.  Neurologic: Alert and oriented. No focal neurological deficits appreciated.   Psychiatric: Normal mood and affect.       "

## 2025-02-12 NOTE — ASSESSMENT & PLAN NOTE
Ms. Rodrigues is a 30-year-old female with history significant for type 1 insulin-dependent diabetes mellitus diagnosed in 2019 and Hashimoto's thyroiditis diagnosed in 2020 who presents for a follow-up of an undifferentiated connective tissue disorder (diagnosed based on a positive SOHAM 1:80 nuclear, speckled, homogeneous patterns, arthralgias, malar rash/photosensitivity)/systemic lupus.  She is currently on hydroxychloroquine 200 mg twice daily on the weekdays and once daily on the weekend that was started in July 2021, injectable methotrexate 20 mg once weekly and belimumab infusions 10 mg/kg every 4 weeks that was added in September 2023.     # Undifferentiated connective tissue disease/systemic lupus  - Jenny presents today for a follow-up of an undifferentiated connective tissue disease/systemic lupus for which she is currently on hydroxychloroquine 200 mg twice daily on the weekdays and 200 mg once daily on the weekend, subcutaneous methotrexate 20 mg once weekly and belimumab infusions on a monthly basis.  She has overall been stable, but still experiences chronic joint issues which periodically flareup and may have been related to her recent infection in 12/24.  She will continue to manage this with gabapentin 600 mg nightly, Tylenol 500 mg twice daily and naproxen 220 milligram once daily as needed.  I do not see an indication to change her DMARDs currently.  She is due to update her monitoring labs to assess for drug toxicity prior to the follow up and will see ophthalmology for bi-annual eye exams.     - Of note she is aware that methotrexate is teratogenic and I recommend discontinuing this 3 months prior to planning conception.  We also discussed there is insufficient data on the safety of belimumab during pregnancy and around the time of pregnancy planning we will need to have a discussion regarding the risks and benefits.  In the meanwhile she will continue with dual contraception including birth  control pills and barrier contraception.      - In regards to the right hip pain I do not suspect this to be related to a rheumatic issue - there is no evidence of an inflammatory arthritis on the recent MRI of the hip and her symptoms did not improve with immunosuppressive agents.  She can continue follow up with Orthopedics to determine the next steps in management.  An intra-articular non-steroidal medication can be considered for short term relief if she prefers to delay surgery.  If surgery is considered we will need to review use of her immunosuppressive medications in the perioperative period.     Orders:    CBC and differential; Future    Comprehensive metabolic panel; Future    C-reactive protein; Future    Sedimentation rate, automated; Future    C4 complement; Future    C3 complement; Future    Anti-DNA antibody, double-stranded; Future    Urinalysis with microscopic; Future    Protein / creatinine ratio, urine; Future    hydroxychloroquine (PLAQUENIL) 200 mg tablet; Take 1 tab twice daily on M-F and 1 tab once daily on the weekend.

## 2025-02-13 ENCOUNTER — OFFICE VISIT (OUTPATIENT)
Dept: NEUROLOGY | Facility: CLINIC | Age: 30
End: 2025-02-13
Payer: COMMERCIAL

## 2025-02-13 ENCOUNTER — DOCUMENTATION (OUTPATIENT)
Dept: NEUROLOGY | Facility: CLINIC | Age: 30
End: 2025-02-13

## 2025-02-13 VITALS
HEIGHT: 62 IN | OXYGEN SATURATION: 98 % | HEART RATE: 71 BPM | SYSTOLIC BLOOD PRESSURE: 120 MMHG | BODY MASS INDEX: 26.39 KG/M2 | WEIGHT: 143.4 LBS | TEMPERATURE: 98.1 F | RESPIRATION RATE: 18 BRPM | DIASTOLIC BLOOD PRESSURE: 70 MMHG

## 2025-02-13 DIAGNOSIS — G81.94 LEFT HEMIPARESIS (HCC): ICD-10-CM

## 2025-02-13 DIAGNOSIS — R47.89 WORD FINDING PROBLEM: Primary | ICD-10-CM

## 2025-02-13 DIAGNOSIS — G45.9 TIA (TRANSIENT ISCHEMIC ATTACK): ICD-10-CM

## 2025-02-13 PROCEDURE — 99215 OFFICE O/P EST HI 40 MIN: CPT | Performed by: PSYCHIATRY & NEUROLOGY

## 2025-02-13 NOTE — PROGRESS NOTES
Name: Jenny Rodrigues      : 1995      MRN: [id number]  Encounter Provider: Alma Espitia MD  Encounter Date: 2025   Encounter department: [facility] [place]  :  Assessment & Plan  Word finding problem  Mrs. Rodrigues continue describing word finding difficulties as we seen during this office visit.  Patient will be advised to follow-up with cognitive therapy as part of our speech therapy to establish baseline by evaluating 10 cognitive domains including language/attention/visual-spatial function.  I would believe her word finding difficulty is multifactorial due to multiple autoimmune condition as well as underlying mood disorder and iatrogenic as well.   Orders:  •  Ambulatory Referral to Speech Therapy; Future  •  MRI brain with and without contrast; Future    Left hemiparesis (HCC)  Complete improvement of left-sided hemiparesis noted on exam today.  Brain MRI will be following within several weeks.  Cervical spine MRI completed in May 2024 not consistent with increasing cord pathology or significant degenerative changes.  Orders:  •  MRI brain with and without contrast; Future    TIA (transient ischemic attack)  Mrs. Rodrigues has presented to [facility] multiple sclerosis center for follow-up on transient neurological deficit in the setting of underlying rheumatological condition exacerbation.  Patient described having another instance of the left-sided hemiparesis involving left upper lower extremity in the face with sensorimotor dysfunction which lasted up to 36 hours in the setting of infection and immunosuppressive state.  Patient receives Plaquenil with methotrexate and Belimumab for  SLE with prior evaluation was not consistent with ischemic changes or SLE vasculitis.   Patient has residual mild weakness in left upper extremity but patient has complete resolution of her symptoms.  Patient will be advised to consider aspirin 81 mg at the time when she develops new  focal neurological deficit.    Meanwhile, I will be offering MRI brain with without contrast as a follow-up study as last MRI done in October 2023 suggesting no significant intracranial pathology.    Orders:  •  MRI brain with and without contrast; Future          History of Present Illness   HPI  Mrs. Rodrigues has presented to [facility] multiple sclerosis center for follow-up on chronic daily headaches with dysphagia.   Today patient describing developing brain fog and difficulties with concentration.  Patient started having more noticeable word finding difficulties requiring time to finally be able to, with the word she was thinking.  Sometimes it comes up to 36 hours.  Patient stated that she cannot fall asleep.  Patient also stated her right upper extremity shakes more.  Intermittent sensory dysfunction in form of pain in fingers has been noted recently.  Patient stated in December 2024 she had upper respiratory illness with lupus relapse during the high fever.  Patient stated sickness lasted for 3 days and she required discontinuation of methotrexate as per rheumatology team.    Patient continued describing chronic migraines but no significant worsening of her headache advised patient has been taking Qulipta and Maxalt in addition to Emgality 120 mg/mL.  Her current regimen was described as beneficial for breakthrough headaches.  Patient believes she had relapse with left-sided sensorimotor dysfunction involving the left face and left upper and lower extremity been noted.  Patient also had tingling sensation in her hand but all symptoms had completely resolved.    We reviewed patient serologic workup including B12 level 366, CRP 5.9, ESR was normal.     Review of Systems   Constitutional: Negative.  Negative for chills and fever.   HENT: Negative.  Negative for ear pain and sore throat.    Eyes: Negative.  Negative for pain and visual disturbance.   Respiratory: Negative.  Negative for cough and shortness of  "breath.    Cardiovascular: Negative.  Negative for chest pain and palpitations.   Gastrointestinal: Negative.  Negative for abdominal pain and vomiting.   Endocrine: Negative.    Genitourinary: Negative.  Negative for dysuria and hematuria.   Musculoskeletal: Negative.  Negative for arthralgias and back pain.   Skin: Negative.  Negative for color change and rash.   Allergic/Immunologic: Negative.    Neurological:  Positive for dizziness, tremors (right hand - intermittent), weakness and headaches. Negative for seizures and syncope.   Hematological: Negative.    Psychiatric/Behavioral:  Positive for confusion (brain fog).    All other systems reviewed and are negative.   I have personally reviewed the MA's review of systems and made changes as necessary.         Objective   /70 (BP Location: Left arm, Patient Position: Sitting, Cuff Size: Adult)   Pulse 71   Temp 98.1 °F (36.7 °C) (Temporal)   Resp 18   Ht 5' 1.75\" (1.568 m)   Wt 65 kg (143 lb 6.4 oz)   SpO2 98%   BMI 26.44 kg/m²     Physical Exam  Neurological Exam  CONSTITUTIONAL: NAD, pleasant. NECK: supple, no lymphadenopathy, no thyromegaly, no JVD. CARDIOVASCULAR: RRR, normal S1S2, no murmurs, no rubs. RESP: clear to auscultation bilaterally, no wheezes/rhonchi/rales. ABDOMEN: soft, non tender, non distended. SKIN: no rash or skin lesions. EXTREMITIES: no edema, pulses 2+bilaterally. PSYCH: appropriate mood and affect  NEUROLOGIC COMPREHENSIVE EXAM: Patient is oriented to person, place and time, NAD; appropriate affect. CN II, III, IV, V, VI, VII,VIII,IX,X,XI-XII intact with EOMI, PERRLA, OKN intact, VF grossly intact, fundi poorly visualized secondary to pupillary constriction; symmetric face noted. Motor: 5/5 UE/LE bilateral symmetric, RUE 4/5 shoulder abduction and finger extension, 4/5 right hip flexion with 5/5 left hip flexion, knee extension and dorsiflexion.; Sensory: intact to light touch and pinprick bilaterally; normal vibration " sensation feet bilaterally; Coordination within normal limits on FTN and [place] testing; DTR: 2/4 through, no Babinski, no clonus. Tandem gait is intact. Romberg: absent.      Administrative Statements   I have spent a total time of 30 minutes in caring for this patient on the day of the visit/encounter including Prognosis, Risks and benefits of tx options, Instructions for management, Counseling / Coordination of care, Documenting in the medical record, Reviewing / ordering tests, medicine, procedures  , and Obtaining or reviewing history  .

## 2025-02-13 NOTE — ASSESSMENT & PLAN NOTE
Complete improvement of left-sided hemiparesis noted on exam today.  Brain MRI will be following within several weeks.  Cervical spine MRI completed in May 2024 not consistent with increasing cord pathology or significant degenerative changes.  Orders:  •  MRI brain with and without contrast; Future

## 2025-02-24 ENCOUNTER — TREATMENT (OUTPATIENT)
Facility: HOSPITAL | Age: 30
End: 2025-02-24

## 2025-02-24 DIAGNOSIS — E10.8 CONTROLLED DIABETES MELLITUS TYPE 1 WITH COMPLICATIONS (HCC): Primary | ICD-10-CM

## 2025-02-24 DIAGNOSIS — M79.7 FIBROMYALGIA: ICD-10-CM

## 2025-02-24 NOTE — PROGRESS NOTES
Patient opened in error. Saw she follows with endocrine for diabetes till pregnancy achieved.    Sierra Mane MD

## 2025-02-25 RX ORDER — GABAPENTIN 600 MG/1
600 TABLET ORAL DAILY
Qty: 90 TABLET | Refills: 1 | Status: SHIPPED | OUTPATIENT
Start: 2025-02-25

## 2025-03-11 DIAGNOSIS — E10.65 TYPE 1 DIABETES MELLITUS WITH HYPERGLYCEMIA (HCC): ICD-10-CM

## 2025-03-11 DIAGNOSIS — E10.649 UNCONTROLLED TYPE 1 DIABETES MELLITUS WITH HYPOGLYCEMIA WITHOUT COMA (HCC): ICD-10-CM

## 2025-03-11 RX ORDER — INSULIN DEGLUDEC 100 U/ML
32 INJECTION, SOLUTION SUBCUTANEOUS DAILY
Qty: 15 ML | Refills: 2 | Status: SHIPPED | OUTPATIENT
Start: 2025-03-11

## 2025-03-13 ENCOUNTER — APPOINTMENT (OUTPATIENT)
Dept: LAB | Age: 30
End: 2025-03-13
Payer: COMMERCIAL

## 2025-03-13 DIAGNOSIS — E10.65 TYPE 1 DIABETES MELLITUS WITH HYPERGLYCEMIA (HCC): ICD-10-CM

## 2025-03-13 LAB
ALBUMIN SERPL BCG-MCNC: 4.7 G/DL (ref 3.5–5)
ALP SERPL-CCNC: 69 U/L (ref 34–104)
ALT SERPL W P-5'-P-CCNC: 11 U/L (ref 7–52)
ANION GAP SERPL CALCULATED.3IONS-SCNC: 9 MMOL/L (ref 4–13)
AST SERPL W P-5'-P-CCNC: 18 U/L (ref 13–39)
BILIRUB SERPL-MCNC: 0.69 MG/DL (ref 0.2–1)
BUN SERPL-MCNC: 11 MG/DL (ref 5–25)
CALCIUM SERPL-MCNC: 9.6 MG/DL (ref 8.4–10.2)
CHLORIDE SERPL-SCNC: 101 MMOL/L (ref 96–108)
CHOLEST SERPL-MCNC: 203 MG/DL (ref ?–200)
CO2 SERPL-SCNC: 26 MMOL/L (ref 21–32)
CREAT SERPL-MCNC: 0.79 MG/DL (ref 0.6–1.3)
EST. AVERAGE GLUCOSE BLD GHB EST-MCNC: 180 MG/DL
GFR SERPL CREATININE-BSD FRML MDRD: 102 ML/MIN/1.73SQ M
GLUCOSE P FAST SERPL-MCNC: 175 MG/DL (ref 65–99)
HBA1C MFR BLD: 7.9 %
HDLC SERPL-MCNC: 74 MG/DL
LDLC SERPL CALC-MCNC: 113 MG/DL (ref 0–100)
POTASSIUM SERPL-SCNC: 4.2 MMOL/L (ref 3.5–5.3)
PROT SERPL-MCNC: 7.4 G/DL (ref 6.4–8.4)
SODIUM SERPL-SCNC: 136 MMOL/L (ref 135–147)
TRIGL SERPL-MCNC: 80 MG/DL (ref ?–150)
TSH SERPL DL<=0.05 MIU/L-ACNC: 1.38 UIU/ML (ref 0.45–4.5)

## 2025-03-13 PROCEDURE — 84443 ASSAY THYROID STIM HORMONE: CPT

## 2025-03-13 PROCEDURE — 36415 COLL VENOUS BLD VENIPUNCTURE: CPT

## 2025-03-13 PROCEDURE — 80053 COMPREHEN METABOLIC PANEL: CPT

## 2025-03-13 PROCEDURE — 83036 HEMOGLOBIN GLYCOSYLATED A1C: CPT

## 2025-03-13 PROCEDURE — 80061 LIPID PANEL: CPT

## 2025-03-14 ENCOUNTER — HOSPITAL ENCOUNTER (OUTPATIENT)
Dept: INFUSION CENTER | Facility: CLINIC | Age: 30
End: 2025-03-14
Payer: COMMERCIAL

## 2025-03-14 ENCOUNTER — RESULTS FOLLOW-UP (OUTPATIENT)
Dept: ENDOCRINOLOGY | Facility: CLINIC | Age: 30
End: 2025-03-14

## 2025-03-14 ENCOUNTER — OFFICE VISIT (OUTPATIENT)
Dept: ENDOCRINOLOGY | Facility: CLINIC | Age: 30
End: 2025-03-14
Payer: COMMERCIAL

## 2025-03-14 VITALS
TEMPERATURE: 98 F | RESPIRATION RATE: 17 BRPM | HEART RATE: 76 BPM | DIASTOLIC BLOOD PRESSURE: 82 MMHG | OXYGEN SATURATION: 98 % | WEIGHT: 144 LBS | BODY MASS INDEX: 27.21 KG/M2 | SYSTOLIC BLOOD PRESSURE: 116 MMHG

## 2025-03-14 VITALS
SYSTOLIC BLOOD PRESSURE: 120 MMHG | DIASTOLIC BLOOD PRESSURE: 70 MMHG | HEART RATE: 83 BPM | BODY MASS INDEX: 26.43 KG/M2 | WEIGHT: 140 LBS | HEIGHT: 61 IN | OXYGEN SATURATION: 100 %

## 2025-03-14 DIAGNOSIS — E78.5 HYPERLIPIDEMIA, UNSPECIFIED HYPERLIPIDEMIA TYPE: ICD-10-CM

## 2025-03-14 DIAGNOSIS — E10.65 TYPE 1 DIABETES MELLITUS WITH HYPERGLYCEMIA (HCC): Primary | ICD-10-CM

## 2025-03-14 DIAGNOSIS — E10.69 TYPE 1 DIABETES MELLITUS WITH OTHER SPECIFIED COMPLICATION (HCC): ICD-10-CM

## 2025-03-14 DIAGNOSIS — M32.9 SYSTEMIC LUPUS ERYTHEMATOSUS (HCC): ICD-10-CM

## 2025-03-14 DIAGNOSIS — E03.9 HYPOTHYROIDISM, UNSPECIFIED TYPE: ICD-10-CM

## 2025-03-14 DIAGNOSIS — M32.9 SYSTEMIC LUPUS ERYTHEMATOSUS, UNSPECIFIED SLE TYPE, UNSPECIFIED ORGAN INVOLVEMENT STATUS (HCC): Primary | ICD-10-CM

## 2025-03-14 LAB
DME PARACHUTE DELIVERY DATE REQUESTED: NORMAL
DME PARACHUTE ITEM DESCRIPTION: NORMAL
DME PARACHUTE ORDER STATUS: NORMAL
DME PARACHUTE SUPPLIER NAME: NORMAL
DME PARACHUTE SUPPLIER PHONE: NORMAL

## 2025-03-14 PROCEDURE — 96413 CHEMO IV INFUSION 1 HR: CPT

## 2025-03-14 PROCEDURE — 99214 OFFICE O/P EST MOD 30 MIN: CPT

## 2025-03-14 PROCEDURE — 95251 CONT GLUC MNTR ANALYSIS I&R: CPT

## 2025-03-14 RX ORDER — ACETAMINOPHEN 325 MG/1
650 TABLET ORAL ONCE
Status: COMPLETED | OUTPATIENT
Start: 2025-03-14 | End: 2025-03-14

## 2025-03-14 RX ORDER — SODIUM CHLORIDE 9 MG/ML
20 INJECTION, SOLUTION INTRAVENOUS ONCE
OUTPATIENT
Start: 2025-04-04

## 2025-03-14 RX ORDER — SODIUM CHLORIDE 9 MG/ML
20 INJECTION, SOLUTION INTRAVENOUS ONCE
Status: COMPLETED | OUTPATIENT
Start: 2025-03-14 | End: 2025-03-14

## 2025-03-14 RX ORDER — ACETAMINOPHEN 325 MG/1
650 TABLET ORAL ONCE
OUTPATIENT
Start: 2025-04-04

## 2025-03-14 RX ORDER — DIPHENHYDRAMINE HCL 25 MG
25 TABLET ORAL ONCE
Status: COMPLETED | OUTPATIENT
Start: 2025-03-14 | End: 2025-03-14

## 2025-03-14 RX ORDER — DIPHENHYDRAMINE HCL 25 MG
25 TABLET ORAL ONCE
OUTPATIENT
Start: 2025-04-04

## 2025-03-14 RX ADMIN — DIPHENHYDRAMINE HYDROCHLORIDE 25 MG: 25 TABLET ORAL at 15:21

## 2025-03-14 RX ADMIN — BELIMUMAB 640 MG: 400 INJECTION, POWDER, LYOPHILIZED, FOR SOLUTION INTRAVENOUS at 16:20

## 2025-03-14 RX ADMIN — SODIUM CHLORIDE 20 ML/HR: 0.9 INJECTION, SOLUTION INTRAVENOUS at 15:18

## 2025-03-14 RX ADMIN — ACETAMINOPHEN 650 MG: 325 TABLET ORAL at 15:21

## 2025-03-14 NOTE — PROGRESS NOTES
Patient tolerated her treatment without any adverse reactions. Next appointment confirmed 4/11/25 at 1330 at [place]. Patient declined avs

## 2025-03-14 NOTE — LETTER
[facility]  [address]  [address]  [place] [address]  Dept: [phone]    March 14, 2025     Patient: Jenny Rodrigues   YOB: 1995   Date of Visit: 3/14/2025       To Whom it May Concern:    Jenny Rodrigues is under my professional care. She was seen in the hospital from 3/14/2025 to 03/14/25. She may return to work without limits on 3/15/25.    If you have any questions or concerns, please don't hesitate to call.         Sincerely,          Kylee Nick RN

## 2025-03-14 NOTE — PROGRESS NOTES
Name: Jenny Rodrigues      : 1995      MRN: [id number]  Encounter Provider: MARGARET Pagan  Encounter Date: 3/14/2025   Encounter department: [facility] [place]    Chief Complaint   Patient presents with    Diabetes Type 1    Hypothyroidism   :  Assessment & Plan  Type 1 diabetes mellitus with hyperglycemia (HCC)  BG show fair control when she is using her pump. May delay bolus or mis-count carbs, but otherwise does well.   For now, we reviewed timing and carb counting. Continue same settings  Continue to monitor diet and exercise    Lab Results   Component Value Date    HGBA1C 7.9 (H) 2025       Orders:    Hemoglobin A1C; Future    Comprehensive metabolic panel; Future    Hyperlipidemia, unspecified hyperlipidemia type  Continue lifestyle modifications  LDL goal <100       Hypothyroidism, unspecified type  TSH within normal limits  Continue levothyroxine       Type 1 diabetes mellitus with other specified complication (HCC)    Lab Results   Component Value Date    HGBA1C 7.9 (H) 2025                History of Present Illness   History of Present Illness    Jenny Rodrigues is a 30 y.o. female with type 1 diabetes seen in follow up. Reports complications of neuropathy. Denies recent severe hypoglycemic or severe hyperglycemic episodes. Denies any issues with her current regimen. Last A1C was 7.9. Denies recent illness, hospitalization or steroid use.     History of early heart attack in father before age 40- suspected (she is unsure)  +family history of hyperlipidemia  Is not interested in [employer]    Works out (pilates), eats well    Needs tandem supplies    CGM Interpretation:  Date Range:  to 2025  Device used: Dexcom  Option - Home use  Option - Indication: Type 1 Diabetes  Analysis of data:   Average Glucose: 187  GMI: unable to calculate  Coefficient of Variation: 47%  SD: 87 mg/dL  Time in Target Range: 64%   Time Above  "Range: 16% high, 18% very high  Time Below Range: 1.4% low, 0% very low  Interpretation of data: mostly within range when using pump diligently  More than 72 hours of data was reviewed. Report to be scanned to chart.     Current regimen:   Metformin 1,000 mg BID  Patient is on a Tandem pump prescribed by endocrinology. Discussed with patient in case of malfunctioning of the pump to use basal and bolus therapy as backup which is prescribed to the patient. Also notified patient to call clinic if any issues. Pump settings uploaded into media    Last Eye Exam: 05/16/2024  Last Foot Exam: 04/26/2024    Hypothyroidism: levothyroxine 25 mcg daily    Health Maintenance   Topic Date Due    Diabetic Foot Exam  04/26/2025    Diabetic Eye Exam  05/16/2026     Pertinent Medical History   Neurology, rheum- SLE      Review of Systems   Constitutional:  Negative for chills and fever.   HENT:  Negative for ear pain and sore throat.    Eyes:  Negative for pain and visual disturbance.   Respiratory:  Negative for cough and shortness of breath.    Cardiovascular:  Negative for chest pain and palpitations.   Gastrointestinal:  Negative for abdominal pain and vomiting.   Genitourinary:  Negative for dysuria and hematuria.   Musculoskeletal:  Negative for arthralgias and back pain.   Skin:  Negative for color change and rash.   Neurological:  Negative for seizures and syncope.   All other systems reviewed and are negative.   as per HPI       Medical History Reviewed by provider this encounter:     .    Objective   /70   Pulse 83   Ht 5' 1\" (1.549 m)   Wt 63.5 kg (140 lb)   SpO2 100%   BMI 26.45 kg/m²      Body mass index is 26.45 kg/m².  Wt Readings from Last 3 Encounters:   03/14/25 63.5 kg (140 lb)   02/13/25 65 kg (143 lb 6.4 oz)   02/12/25 64.9 kg (143 lb)     Physical Exam    Physical Exam  Vitals reviewed.   HENT:      Head: Normocephalic and atraumatic.   Cardiovascular:      Rate and Rhythm: Normal rate.   Pulmonary:    "   Effort: Pulmonary effort is normal.   Neurological:      Mental Status: She is alert.       Results    Labs:   Lab Results   Component Value Date    HGBA1C 7.9 (H) 03/13/2025    HGBA1C 9.0 (H) 12/04/2024    HGBA1C 7.8 (H) 09/04/2024     Lab Results   Component Value Date    CREATININE 0.79 03/13/2025    CREATININE 0.70 01/11/2025    CREATININE 0.64 12/04/2024    BUN 11 03/13/2025    K 4.2 03/13/2025     03/13/2025    CO2 26 03/13/2025     GFR, Calculated   Date Value Ref Range Status   12/29/2020 69 >60 mL/min/1.73m2 Final     Comment:     mL/min per 1.73 square meters                                            Normal Function or Mild Renal    Disease (if clinically at risk):  >or=60  Moderately Decreased:                30-59  Severely Decreased:                  15-29  Renal Failure:                         <15                                            -American GFR: multiply reported GFR by 1.16    Please note that the eGFR is based on the CKD-EPI calculation, and is not intended to be used for drug dosing.     eGFRcr   Date Value Ref Range Status   07/09/2023 125 >59 Final     eGFR   Date Value Ref Range Status   03/13/2025 102 ml/min/1.73sq m Final     Lab Results   Component Value Date    HDL 74 03/13/2025    TRIG 80 03/13/2025     Lab Results   Component Value Date    ALT 11 03/13/2025    AST 18 03/13/2025    GGT 10 06/26/2019    ALKPHOS 69 03/13/2025     Lab Results   Component Value Date    [id number] 1.383 03/13/2025    ZLW7HPPXLORQ 0.984 10/02/2024    [id number] 4.241 05/03/2024       There are no Patient Instructions on file for this visit.    Discussed with the patient and all questioned fully answered. She will call me if any problems arise.

## 2025-03-14 NOTE — PROGRESS NOTES
Patient is here for benlysta and offer no complaints. Patient denies any s/s of infection or being on antibiotics at this time

## 2025-03-14 NOTE — ASSESSMENT & PLAN NOTE
BG show fair control when she is using her pump. May delay bolus or mis-count carbs, but otherwise does well.   For now, we reviewed timing and carb counting. Continue same settings  Continue to monitor diet and exercise    Lab Results   Component Value Date    HGBA1C 7.9 (H) 03/13/2025       Orders:    Hemoglobin A1C; Future    Comprehensive metabolic panel; Future

## 2025-03-19 ENCOUNTER — HOSPITAL ENCOUNTER (OUTPATIENT)
Dept: RADIOLOGY | Age: 30
Discharge: HOME | End: 2025-03-19
Payer: COMMERCIAL

## 2025-03-19 DIAGNOSIS — G81.94 LEFT HEMIPARESIS (HCC): ICD-10-CM

## 2025-03-19 DIAGNOSIS — G45.9 TIA (TRANSIENT ISCHEMIC ATTACK): ICD-10-CM

## 2025-03-19 DIAGNOSIS — R47.89 WORD FINDING PROBLEM: ICD-10-CM

## 2025-03-19 PROCEDURE — A9585 GADOBUTROL INJECTION: HCPCS | Performed by: PSYCHIATRY & NEUROLOGY

## 2025-03-19 PROCEDURE — 70553 MRI BRAIN STEM W/O & W/DYE: CPT

## 2025-03-19 RX ORDER — GADOBUTROL 604.72 MG/ML
6 INJECTION INTRAVENOUS
Status: COMPLETED | OUTPATIENT
Start: 2025-03-19 | End: 2025-03-19

## 2025-03-19 RX ADMIN — GADOBUTROL 6 ML: 604.72 INJECTION INTRAVENOUS at 16:12

## 2025-03-24 ENCOUNTER — TELEPHONE (OUTPATIENT)
Age: 30
End: 2025-03-24

## 2025-04-06 ENCOUNTER — HOSPITAL ENCOUNTER (EMERGENCY)
Facility: HOSPITAL | Age: 30
Discharge: HOME | End: 2025-04-06
Payer: COMMERCIAL

## 2025-04-06 VITALS
TEMPERATURE: 98 F | RESPIRATION RATE: 18 BRPM | HEART RATE: 92 BPM | OXYGEN SATURATION: 99 % | SYSTOLIC BLOOD PRESSURE: 106 MMHG | DIASTOLIC BLOOD PRESSURE: 71 MMHG

## 2025-04-06 DIAGNOSIS — R11.2 NAUSEA VOMITING AND DIARRHEA: Primary | ICD-10-CM

## 2025-04-06 DIAGNOSIS — R73.9 HYPERGLYCEMIA: ICD-10-CM

## 2025-04-06 DIAGNOSIS — R19.7 NAUSEA VOMITING AND DIARRHEA: Primary | ICD-10-CM

## 2025-04-06 LAB
ALBUMIN SERPL BCG-MCNC: 4.8 G/DL (ref 3.5–5)
ALP SERPL-CCNC: 71 U/L (ref 34–104)
ALT SERPL W P-5'-P-CCNC: 12 U/L (ref 7–52)
ANION GAP SERPL CALCULATED.3IONS-SCNC: 12 MMOL/L (ref 4–13)
AST SERPL W P-5'-P-CCNC: 18 U/L (ref 13–39)
ATRIAL RATE: 83 BPM
B-OH-BUTYR SERPL-MCNC: 0.19 MMOL/L (ref 0.02–0.27)
BASE EX.OXY STD BLDV CALC-SCNC: 94.4 % (ref 60–80)
BASE EXCESS BLDV CALC-SCNC: -2.1 MMOL/L
BASOPHILS # BLD AUTO: 0.03 THOUSANDS/ÂΜL (ref 0–0.1)
BASOPHILS NFR BLD AUTO: 0 % (ref 0–1)
BILIRUB SERPL-MCNC: 1.04 MG/DL (ref 0.2–1)
BUN SERPL-MCNC: 11 MG/DL (ref 5–25)
CALCIUM SERPL-MCNC: 9.9 MG/DL (ref 8.4–10.2)
CARDIAC TROPONIN I PNL SERPL HS: <2 NG/L (ref ?–50)
CHLORIDE SERPL-SCNC: 103 MMOL/L (ref 96–108)
CO2 SERPL-SCNC: 20 MMOL/L (ref 21–32)
CREAT SERPL-MCNC: 0.69 MG/DL (ref 0.6–1.3)
EOSINOPHIL # BLD AUTO: 0.01 THOUSAND/ÂΜL (ref 0–0.61)
EOSINOPHIL NFR BLD AUTO: 0 % (ref 0–6)
ERYTHROCYTE [DISTWIDTH] IN BLOOD BY AUTOMATED COUNT: 15.3 % (ref 11.6–15.1)
EXT PREGNANCY TEST URINE: NEGATIVE
EXT. CONTROL: NORMAL
GFR SERPL CREATININE-BSD FRML MDRD: 118 ML/MIN/1.73SQ M
GLUCOSE SERPL-MCNC: 229 MG/DL (ref 65–140)
HCO3 BLDV-SCNC: 17.9 MMOL/L (ref 24–30)
HCT VFR BLD AUTO: 41.9 % (ref 34.8–46.1)
HGB BLD-MCNC: 13.8 G/DL (ref 11.5–15.4)
IMM GRANULOCYTES # BLD AUTO: 0.03 THOUSAND/UL (ref 0–0.2)
IMM GRANULOCYTES NFR BLD AUTO: 0 % (ref 0–2)
LIPASE SERPL-CCNC: 19 U/L (ref 11–82)
LYMPHOCYTES # BLD AUTO: 1.49 THOUSANDS/ÂΜL (ref 0.6–4.47)
LYMPHOCYTES NFR BLD AUTO: 18 % (ref 14–44)
MCH RBC QN AUTO: 25.9 PG (ref 26.8–34.3)
MCHC RBC AUTO-ENTMCNC: 32.9 G/DL (ref 31.4–37.4)
MCV RBC AUTO: 79 FL (ref 82–98)
MONOCYTES # BLD AUTO: 0.44 THOUSAND/ÂΜL (ref 0.17–1.22)
MONOCYTES NFR BLD AUTO: 5 % (ref 4–12)
NEUTROPHILS # BLD AUTO: 6.39 THOUSANDS/ÂΜL (ref 1.85–7.62)
NEUTS SEG NFR BLD AUTO: 77 % (ref 43–75)
NRBC BLD AUTO-RTO: 0 /100 WBCS
O2 CT BLDV-SCNC: 18.7 ML/DL
P AXIS: 56 DEGREES
PCO2 BLDV: 20.7 MM HG (ref 42–50)
PH BLDV: 7.55 [PH] (ref 7.3–7.4)
PLATELET # BLD AUTO: 207 THOUSANDS/UL (ref 149–390)
PMV BLD AUTO: 12.2 FL (ref 8.9–12.7)
PO2 BLDV: 77.5 MM HG (ref 35–45)
POTASSIUM SERPL-SCNC: 3.6 MMOL/L (ref 3.5–5.3)
PR INTERVAL: 158 MS
PROT SERPL-MCNC: 7.8 G/DL (ref 6.4–8.4)
QRS AXIS: 43 DEGREES
QRSD INTERVAL: 84 MS
QT INTERVAL: 386 MS
QTC INTERVAL: 453 MS
RBC # BLD AUTO: 5.33 MILLION/UL (ref 3.81–5.12)
SODIUM SERPL-SCNC: 135 MMOL/L (ref 135–147)
T WAVE AXIS: 42 DEGREES
VENTRICULAR RATE: 83 BPM
WBC # BLD AUTO: 8.39 THOUSAND/UL (ref 4.31–10.16)

## 2025-04-06 PROCEDURE — 81025 URINE PREGNANCY TEST: CPT

## 2025-04-06 PROCEDURE — 83690 ASSAY OF LIPASE: CPT

## 2025-04-06 PROCEDURE — 82805 BLOOD GASES W/O2 SATURATION: CPT

## 2025-04-06 PROCEDURE — 80053 COMPREHEN METABOLIC PANEL: CPT

## 2025-04-06 PROCEDURE — 85025 COMPLETE CBC W/AUTO DIFF WBC: CPT

## 2025-04-06 PROCEDURE — 99284 EMERGENCY DEPT VISIT MOD MDM: CPT

## 2025-04-06 PROCEDURE — 93005 ELECTROCARDIOGRAM TRACING: CPT

## 2025-04-06 PROCEDURE — 99285 EMERGENCY DEPT VISIT HI MDM: CPT

## 2025-04-06 PROCEDURE — 93010 ELECTROCARDIOGRAM REPORT: CPT | Performed by: STUDENT IN AN ORGANIZED HEALTH CARE EDUCATION/TRAINING PROGRAM

## 2025-04-06 PROCEDURE — 84484 ASSAY OF TROPONIN QUANT: CPT

## 2025-04-06 PROCEDURE — 36415 COLL VENOUS BLD VENIPUNCTURE: CPT

## 2025-04-06 PROCEDURE — 96374 THER/PROPH/DIAG INJ IV PUSH: CPT

## 2025-04-06 PROCEDURE — 82010 KETONE BODYS QUAN: CPT

## 2025-04-06 PROCEDURE — 96361 HYDRATE IV INFUSION ADD-ON: CPT

## 2025-04-06 PROCEDURE — 96375 TX/PRO/DX INJ NEW DRUG ADDON: CPT

## 2025-04-06 RX ORDER — ONDANSETRON 4 MG/1
4 TABLET, ORALLY DISINTEGRATING ORAL EVERY 6 HOURS PRN
Qty: 12 TABLET | Refills: 0 | Status: SHIPPED | OUTPATIENT
Start: 2025-04-06

## 2025-04-06 RX ORDER — DIPHENHYDRAMINE HYDROCHLORIDE 50 MG/ML
25 INJECTION, SOLUTION INTRAMUSCULAR; INTRAVENOUS ONCE
Status: COMPLETED | OUTPATIENT
Start: 2025-04-06 | End: 2025-04-06

## 2025-04-06 RX ORDER — KETOROLAC TROMETHAMINE 30 MG/ML
15 INJECTION, SOLUTION INTRAMUSCULAR; INTRAVENOUS ONCE
Status: COMPLETED | OUTPATIENT
Start: 2025-04-06 | End: 2025-04-06

## 2025-04-06 RX ORDER — METOCLOPRAMIDE HYDROCHLORIDE 5 MG/ML
10 INJECTION INTRAMUSCULAR; INTRAVENOUS ONCE
Status: COMPLETED | OUTPATIENT
Start: 2025-04-06 | End: 2025-04-06

## 2025-04-06 RX ADMIN — DIPHENHYDRAMINE HYDROCHLORIDE 25 MG: 50 INJECTION, SOLUTION INTRAMUSCULAR; INTRAVENOUS at 11:13

## 2025-04-06 RX ADMIN — SODIUM CHLORIDE 1000 ML: 0.9 INJECTION, SOLUTION INTRAVENOUS at 11:11

## 2025-04-06 RX ADMIN — METOCLOPRAMIDE 10 MG: 5 INJECTION, SOLUTION INTRAMUSCULAR; INTRAVENOUS at 11:13

## 2025-04-06 RX ADMIN — KETOROLAC TROMETHAMINE 15 MG: 30 INJECTION, SOLUTION INTRAMUSCULAR; INTRAVENOUS at 11:37

## 2025-04-06 NOTE — ED PROVIDER NOTES
Time reflects when diagnosis was documented in both MDM as applicable and the Disposition within this note       Time User Action Codes Description Comment    4/6/2025 12:35 PM Nancy Chino Add [R11.2,  R19.7] Nausea vomiting and diarrhea     4/6/2025 12:36 PM Nancy Chino [R73.9] Hyperglycemia           ED Disposition       ED Disposition   Discharge    Condition   Stable    Date/Time   Sun Apr 6, 2025 12:35 PM    Comment   Jenny Rodrigues discharge to home/self care.                   Assessment & Plan       Medical Decision Making  29 y/o female with significant PMH, including type I diabetes, here for abdominal pain, nausea, vomiting, diarrhea, and chills for 9 hours.  Also admits to intermittent chest pain, especially after the episodes of vomiting.  States her Dexcom failed yesterday, but she has an insulin pump on.  She took her blood sugar this morning and it was 354.  Denies fever, shortness of breath, blood in the stool or vomit, leg swelling, calf pain, urinary symptoms, headache, dizziness/lightheadedness.  Patient has generalized abdominal tenderness to palpation.  Ordered labs to rule out DKA.  Gave fluids Toradol, Reglan and Benadryl.  Patient states she feels significantly improved and was able to keep down some water.  Labs negative for diabetic emergency.  Glucose has gone down from when patient checked it earlier this morning, it is at 229 right now.  Discussed supportive care, follow-up, risks and strict return precautions.  Patient was agreeable to plan and was discharged home.    Amount and/or Complexity of Data Reviewed  Labs: ordered. Decision-making details documented in ED Course.    Risk  Prescription drug management.        ED Course as of 04/06/25 1305   Sun Apr 06, 2025   1134 pH, Dariel(!): 7.555   1138 Beta- Hydroxybutyrate: 0.19   1139 LIPASE: 19   1140 hs TnI 0hr: <2  No need for delta, started 9 hours ago   1142 EKG was normal sinus, rate of 83   1155 Patient reports  she is feeling much better   1158 GLUCOSE(!): 229   1228 PREGNANCY TEST URINE: Negative  Negative        Medications   sodium chloride 0.9 % bolus 1,000 mL (1,000 mL Intravenous New Bag 4/6/25 1111)   ketorolac (TORADOL) injection 15 mg (15 mg Intravenous Given 4/6/25 1137)   metoclopramide (REGLAN) injection 10 mg (10 mg Intravenous Given 4/6/25 1113)   diphenhydrAMINE (BENADRYL) injection 25 mg (25 mg Intravenous Given 4/6/25 1113)       ED Risk Strat Scores   HEART Risk Score      Flowsheet Row Most Recent Value   Heart Score Risk Calculator    History 0 Filed at: 04/06/2025 1305   ECG 0 Filed at: 04/06/2025 1305   Age 0 Filed at: 04/06/2025 1305   Risk Factors 1 Filed at: 04/06/2025 1305   Troponin 0 Filed at: 04/06/2025 1305   HEART Score 1 Filed at: 04/06/2025 1305          HEART Risk Score      Flowsheet Row Most Recent Value   Heart Score Risk Calculator    History 0 Filed at: 04/06/2025 1305   ECG 0 Filed at: 04/06/2025 1305   Age 0 Filed at: 04/06/2025 1305   Risk Factors 1 Filed at: 04/06/2025 1305   Troponin 0 Filed at: 04/06/2025 1305   HEART Score 1 Filed at: 04/06/2025 1305                                                  History of Present Illness       Chief Complaint   Patient presents with    Vomiting     C/o abd pain and vomiting since 2am.       Past Medical History:   Diagnosis Date    Abdominal pain     Anaphylaxis     Anxiety     Anxiety and depression     COVID-19 12/2020    Delayed emergence from anesthesia 03/23/2023    Severe episode of emergence delirium (confused, combative) after MAC anesthetic on 03/2023 for EGD. Received versed 2mg, >50mcg precedex, 5mg IV haldol, and 50mcg fentanyl. Waxing and waning episodes of agitation. Any future anesthetics should NOT be done at [facility].    Delirium, induced by drug     anesthesia    Depression     Diabetes mellitus (HCC)     Disease of thyroid gland     Hashimoto    DKA, type 1 (HCC) 05/11/2021    Eating disorder     history of anorexia/bulemia  [id number]    Fatigue     Food intolerance     Fracture of fibula     R Salter I    Gilbert disease     Hashimoto's disease 02/21/2020    Head injury     Headache(784.0)     Migraine     Nasal congestion     PTSD (post-traumatic stress disorder)     Rectal bleed     Seizures (HCC)     Type 1 diabetes (HCC)       Past Surgical History:   Procedure Laterality Date    COLONOSCOPY      EGD  03/23/2023    KNEE SURGERY Right 07/06/2020    NASAL SEPTOPLASTY W/ TURBINOPLASTY      SINUS SURGERY      SKIN BIOPSY      TURBINOPLASTY N/A 03/22/2021    Procedure: TURBINOPLASTY;  Surgeon: Jn Hidalgo MD;  Location: [facility];  Service: ENT    UPPER GASTROINTESTINAL ENDOSCOPY      WISDOM TOOTH EXTRACTION      WRIST SURGERY      left; Excision of ganglion      Family History   Problem Relation Age of Onset    Hypertension Mother     Migraines Mother         Headache    Diabetes type II Mother     Varicose Veins Mother     Hyperlipidemia Mother     Diabetes Mother     Arthritis Mother     Depression Mother     Hearing loss Mother     Anxiety disorder Mother     Eczema Father     Cholelithiasis Father     Hypertension Father     Sarcoidosis Father         Liver    Hyperlipidemia Father     Diabetes Father     Coronary artery disease Father     Nephrolithiasis Father     Cirrhosis Father     Alcohol abuse Father     Thyroid disease Sister     Hashimoto's thyroiditis Sister     Alcohol abuse Brother     Cancer Family     Diabetes Family     Hypertension Family     Thyroid disease Sister       Social History     Tobacco Use    Smoking status: Never     Passive exposure: Never    Smokeless tobacco: Never    Tobacco comments:     Tobacco smoke exposure (Father smokes cigars)   Vaping Use    Vaping status: Never Used   Substance Use Topics    Alcohol use: Not Currently     Comment: rarely    Drug use: No      E-Cigarette/Vaping    E-Cigarette Use Never User       E-Cigarette/Vaping Substances    Nicotine No     THC No     CBD No      Flavoring No     Other No     Unknown No       I have reviewed and agree with the history as documented.     Patient is a 29 y/o female with significant PMH, including type I diabetes, here for abdominal pain, nausea, vomiting, diarrhea, and chills for 9 hours.  Also admits to intermittent chest pain, especially after the episodes of vomiting.  States her Dexcom failed yesterday, but she has an insulin pump on.  She took her blood sugar this morning and it was 354.  Denies fever, shortness of breath, blood in the stool or vomit, leg swelling, calf pain, urinary symptoms, headache, dizziness/lightheadedness.       Vomiting  Associated symptoms: abdominal pain, chills and diarrhea    Associated symptoms: no arthralgias, no cough, no fever, no headaches, no myalgias and no sore throat        Review of Systems   Constitutional:  Positive for chills. Negative for fever.   HENT:  Negative for ear pain, rhinorrhea and sore throat.    Eyes:  Negative for pain and visual disturbance.   Respiratory:  Negative for cough, chest tightness, shortness of breath and wheezing.    Cardiovascular:  Positive for chest pain. Negative for palpitations.   Gastrointestinal:  Positive for abdominal pain, diarrhea, nausea and vomiting. Negative for abdominal distention and blood in stool.   Genitourinary:  Negative for difficulty urinating, dysuria, flank pain, frequency, hematuria and urgency.   Musculoskeletal:  Negative for arthralgias, back pain, myalgias, neck pain and neck stiffness.   Skin:  Negative for color change and rash.   Neurological:  Negative for dizziness, seizures, syncope, weakness, light-headedness and headaches.   All other systems reviewed and are negative.      Objective       ED Triage Vitals [04/06/25 1029]   Temperature Pulse Blood Pressure Respirations SpO2 Patient Position - Orthostatic VS   98 °F (36.7 °C) 92 106/71 18 99 % --      Temp src Heart Rate Source BP Location FiO2 (%) Pain Score    -- -- -- -- --       Vitals      Date and Time Temp Pulse SpO2 Resp BP Pain Score FACES Pain Rating User   04/06/25 1029 98 °F (36.7 °C) 92 99 % 18 106/71 -- -- AS            Physical Exam  Vitals and nursing note reviewed.   Constitutional:       General: She is not in acute distress.     Appearance: Normal appearance. She is ill-appearing. She is not toxic-appearing or diaphoretic.   HENT:      Head: Normocephalic.      Nose: Nose normal. No congestion or rhinorrhea.      Mouth/Throat:      Mouth: Mucous membranes are moist.      Pharynx: Oropharynx is clear. No oropharyngeal exudate or posterior oropharyngeal erythema.   Eyes:      Conjunctiva/sclera: Conjunctivae normal.      Pupils: Pupils are equal, round, and reactive to light.   Cardiovascular:      Rate and Rhythm: Normal rate and regular rhythm.      Pulses: Normal pulses.      Heart sounds: Normal heart sounds.   Pulmonary:      Effort: Pulmonary effort is normal. No tachypnea, accessory muscle usage or respiratory distress.      Breath sounds: Normal breath sounds. No stridor. No wheezing, rhonchi or rales.   Chest:      Chest wall: No tenderness.   Abdominal:      General: Abdomen is flat. There is no distension.      Palpations: Abdomen is soft.      Tenderness: There is generalized abdominal tenderness. There is no right CVA tenderness, left CVA tenderness, guarding or rebound.   Musculoskeletal:         General: Normal range of motion.      Cervical back: Normal range of motion and neck supple.   Skin:     General: Skin is warm and dry.      Capillary Refill: Capillary refill takes less than 2 seconds.      Coloration: Skin is not cyanotic or pale.   Neurological:      General: No focal deficit present.      Mental Status: She is alert and oriented to person, place, and time.   Psychiatric:         Mood and Affect: Mood normal.         Behavior: Behavior normal.         Thought Content: Thought content normal.         Judgment: Judgment normal.         Results  Reviewed       Procedure Component Value Units Date/Time    POCT pregnancy, urine [[id number]]  (Normal) Collected: 04/06/25 1115    Lab Status: Edited Result - FINAL Updated: 04/06/25 1206     EXT Preg Test, Ur Negative     Control Valid    HS Troponin 0hr (reflex protocol) [[id number]]  (Normal) Collected: 04/06/25 1109    Lab Status: Final result Specimen: Blood from Arm, Right Updated: 04/06/25 1141     hs TnI 0hr <2 ng/L     Beta Hydroxybutyrate [[id number]]  (Normal) Collected: 04/06/25 1109    Lab Status: Final result Specimen: Blood from Arm, Right Updated: 04/06/25 1138     Beta- Hydroxybutyrate 0.19 mmol/L     Comprehensive metabolic panel [[id number]]  (Abnormal) Collected: 04/06/25 1109    Lab Status: Final result Specimen: Blood from Arm, Right Updated: 04/06/25 1138     Sodium 135 mmol/L      Potassium 3.6 mmol/L      Chloride 103 mmol/L      CO2 20 mmol/L      ANION GAP 12 mmol/L      BUN 11 mg/dL      Creatinine 0.69 mg/dL      Glucose 229 mg/dL      Calcium 9.9 mg/dL      AST 18 U/L      ALT 12 U/L      Alkaline Phosphatase 71 U/L      Total Protein 7.8 g/dL      Albumin 4.8 g/dL      Total Bilirubin 1.04 mg/dL      eGFR 118 ml/min/1.73sq m     Narrative:      National Kid[employer] Disease Foundation guidelines for Chronic Kidney Disease (CKD):     Stage 1 with normal or high GFR (GFR > 90 mL/min/1.73 square meters)    Stage 2 Mild CKD (GFR = 60-89 mL/min/1.73 square meters)    Stage 3A Moderate CKD (GFR = 45-59 mL/min/1.73 square meters)    Stage 3B Moderate CKD (GFR = 30-44 mL/min/1.73 square meters)    Stage 4 Severe CKD (GFR = 15-29 mL/min/1.73 square meters)    Stage 5 End Stage CKD (GFR <15 mL/min/1.73 square meters)  Note: GFR calculation is accurate only with a steady state creatinine    Lipase [[id number]]  (Normal) Collected: 04/06/25 1109    Lab Status: Final result Specimen: Blood from Arm, Right Updated: 04/06/25 1138     Lipase 19 u/L     Blood gas, venous [[id number]]  (Abnormal) Collected:  25 1109    Lab Status: Final result Specimen: Blood from Arm, Right Updated: 25 1122     pH, Dariel 7.555     pCO2, Dariel 20.7 mm Hg      pO2, Dariel 77.5 mm Hg      HCO3, Dariel 17.9 mmol/L      Base Excess, Dariel -2.1 mmol/L      O2 Content, Dariel 18.7 ml/dL      O2 HGB, VENOUS 94.4 %     CBC and differential [[id number]]  (Abnormal) Collected: 25 1109    Lab Status: Final result Specimen: Blood from Arm, Right Updated: 25 1117     WBC 8.39 Thousand/uL      RBC 5.33 Million/uL      Hemoglobin 13.8 g/dL      Hematocrit 41.9 %      MCV 79 fL      MCH 25.9 pg      MCHC 32.9 g/dL      RDW 15.3 %      MPV 12.2 fL      Platelets 207 Thousands/uL      nRBC 0 /100 WBCs      Segmented % 77 %      Immature Grans % 0 %      Lymphocytes % 18 %      Monocytes % 5 %      Eosinophils Relative 0 %      Basophils Relative 0 %      Absolute Neutrophils 6.39 Thousands/µL      Absolute Immature Grans 0.03 Thousand/uL      Absolute Lymphocytes 1.49 Thousands/µL      Absolute Monocytes 0.44 Thousand/µL      Eosinophils Absolute 0.01 Thousand/µL      Basophils Absolute 0.03 Thousands/µL             No orders to display       ECG 12 Lead Documentation Only    Date/Time: 2025 11:33 AM    Performed by: Nancy Chino PA-C  Authorized by: Nancy Chino PA-C    ECG reviewed by me, the ED Provider: yes    Patient location:  ED  Previous ECG:     Previous ECG:  Compared to current    Similarity:  No change  Interpretation:     Interpretation: normal    Rate:     ECG rate:  83    ECG rate assessment: normal    Rhythm:     Rhythm: sinus rhythm    Ectopy:     Ectopy: none    QRS:     QRS axis:  Normal    QRS intervals:  Normal  Conduction:     Conduction: normal    ST segments:     ST segments:  Normal  T waves:     T waves: normal        ED Medication and Procedure Management   Prior to Admission Medications   Prescriptions Last Dose Informant Patient Reported? Taking?   Atogepant (Qulipta) 10 MG TABS   No No   Si tab  "daily.   BD Insulin Syringe U/F 31G X \" 0.5 ML MISC   No No   Sig: Use as directly with weekly methotrexate injection.   Belimumab (BENLYSTA IV)  Self Yes No   Sig: Inject into a catheter in a vein every month to absorb continually Switching to monthly on    Cyanocobalamin 1000 MCG SUBL  Self No No   Sig: Place 1 tablet (1,000 mcg total) under the tongue daily   EPINEPHrine (EPIPEN) 0.3 mg/0.3 mL SOAJ   No No   Sig: Inject 0.3 mL (0.3 mg total) into a muscle once for 1 dose   Galcanezumab-gnlm (Emgality) 120 MG/ML SOAJ  Self No No   Sig: One ml subcutaneous on the right thigh and 1 ml subcutaneous on the left thigh at the same time for 1 dose   Patient not taking: Reported on 10/14/2024   Galcanezumab-gnlm (Emgality) 120 MG/ML SOAJ  Self No No   Si days after loading dose, inject 1 pen subq every 30 days.   Patient not taking: Reported on 10/14/2024   Glucagon (Gvoke HypoPen 2-Pack) 1 MG/0.2ML SOAJ  Self No No   Si mg PRN for severe hypoglycemia   Glucagon HCl (Glucagon Emergency) 1 MG/ML SOLR  Self Yes No   Patient not taking: Reported on 2024   Insulin Infusion Pump (T:slim X2 Ins Pump/Control-IQ) ANURAG   Yes No   Sig: Use   Insulin Pen Needle (BD PEN NEEDLE NASIM U/F) 32G X 4 MM MISC  Self No No   Sig: by Does not apply route 4 (four) times a day for 180 days   LORazepam (ATIVAN PO)  Self Yes No   Sig: Take by mouth daily as needed   Patient not taking: Reported on 3/14/2025   Naproxen Sodium (ALEVE PO)  Self Yes No   Sig: Take by mouth daily as needed   NovoLOG 100 UNIT/ML injection   No No   Sig: Up to 100 units per day with insulin pump   Tresiba FlexTouch 100 units/mL injection pen   No No   Sig: Inject 32 Units under the skin daily   acetaminophen (TYLENOL) 325 mg tablet  Self No No   Sig: Take 3 tablets (975 mg total) by mouth every 8 (eight) hours as needed for mild pain or headaches   albuterol (ProAir HFA) 90 mcg/act inhaler   No No   Sig: Inhale 2 puffs every 6 (six) hours as needed " for wheezing or shortness of breath   Patient not taking: Reported on 2/12/2025   benzonatate (TESSALON) 200 MG capsule   No No   Sig: Take 1 capsule (200 mg total) by mouth 3 (three) times a day as needed for cough   Patient not taking: Reported on 2/12/2025   clindamycin (CLEOCIN T) 1 % lotion  Self Yes No   Sig: Apply 1 application. topically 2 (two) times a day   Patient not taking: Reported on 2/12/2025   famotidine (PEPCID) 40 MG tablet  Self No No   Sig: Take 1 tablet (40 mg total) by mouth daily at bedtime   fluticasone (FLONASE) 50 mcg/act nasal spray   No No   Sig: SPRAY 1 SPRAY INTO EACH NOSTRIL EVERY DAY   folic acid (FOLVITE) 1 mg tablet  Self No No   Sig: TAKE 1 TABLET BY MOUTH EVERY DAY   gabapentin (Neurontin) 600 MG tablet   No No   Sig: Take 1 tablet (600 mg total) by mouth daily   hydroxychloroquine (PLAQUENIL) 200 mg tablet   No No   Sig: Take 1 tab twice daily on M-F and 1 tab once daily on the weekend.   insulin degludec (Tresiba FlexTouch) 100 units/mL injection pen   No No   Sig: INJECT 32 UNITS DAILY   ketorolac (TORADOL) 10 mg tablet  Self No No   Sig: Take 1 tablet (10 mg total) by mouth every 6 (six) hours as needed (migraine) Max 2-3 per week.   levonorgestrel-ethinyl estradiol ([place]vera) 0.15-30 MG-MCG per tablet   No No   Sig: TAKE 1 TABLET BY MOUTH EVERY DAY   Patient not taking: Reported on 3/14/2025   levothyroxine 25 mcg tablet   No No   Sig: Take 1 tablet (25 mcg total) by mouth daily   metFORMIN (GLUCOPHAGE-XR) 500 mg 24 hr tablet   No No   Sig: Take 2 tablets (1,000 mg total) by mouth 2 (two) times a day with meals   methotrexate 50 MG/2ML injection   No No   Sig: INJECT 0.8 ML UNDER THE SKIN ONCE EVERY 7 DAYS. DISCARD UNUSED REMAINDER 30 DAYS AFTER INITIAL USE   pantoprazole (PROTONIX) 40 mg tablet   No No   Sig: TAKE 1 TABLET BY MOUTH EVERY DAY   promethazine (PHENERGAN) 25 mg tablet   No No   Sig: Take 1 tablet (25 mg total) by mouth every 6 (six) hours as needed for nausea  or vomiting for up to 3 days   Patient not taking: Reported on 3/14/2025   rizatriptan (MAXALT-MLT) 10 mg disintegrating tablet  Self No No   Si tab at migraine onset, repeat after 2 hours if needed; Max 2 per day and 3 per week.   sucralfate (CARAFATE) 1 g tablet  Self No No   Sig: Take 1 tablet (1 g total) by mouth 4 (four) times a day   Patient not taking: Reported on 2025      Facility-Administered Medications: None     Patient's Medications   Discharge Prescriptions    ONDANSETRON (ZOFRAN-ODT) 4 MG DISINTEGRATING TABLET    Take 1 tablet (4 mg total) by mouth every 6 (six) hours as needed for nausea or vomiting for up to 12 doses       Start Date: 2025  End Date: --       Order Dose: 4 mg       Quantity: 12 tablet    Refills: 0     No discharge procedures on file.  ED SEPSIS DOCUMENTATION   Time reflects when diagnosis was documented in both MDM as applicable and the Disposition within this note       Time User Action Codes Description Comment    2025 12:35 PM Nancy Chino [R11.2,  R19.7] Nausea vomiting and diarrhea     2025 12:36 PM Nancy Chino [R73.9] Hyperglycemia                  Nancy Chino PA-C  25 1810

## 2025-04-06 NOTE — DISCHARGE INSTRUCTIONS
Maintain adequate hydration. [place] diet as tolerated. May take Zofran every 6-8 hours as needed for nausea. Follow up with primary care doctor this week. If symptoms worsen, return to the emergency department for re-evaluation.

## 2025-04-09 ENCOUNTER — OFFICE VISIT (OUTPATIENT)
Dept: NEUROLOGY | Facility: CLINIC | Age: 30
End: 2025-04-09
Payer: COMMERCIAL

## 2025-04-09 VITALS
SYSTOLIC BLOOD PRESSURE: 103 MMHG | WEIGHT: 144 LBS | DIASTOLIC BLOOD PRESSURE: 77 MMHG | BODY MASS INDEX: 27.19 KG/M2 | HEIGHT: 61 IN | HEART RATE: 87 BPM

## 2025-04-09 DIAGNOSIS — G43.709 CHRONIC MIGRAINE WITHOUT AURA WITHOUT STATUS MIGRAINOSUS, NOT INTRACTABLE: Primary | ICD-10-CM

## 2025-04-09 PROCEDURE — 99213 OFFICE O/P EST LOW 20 MIN: CPT | Performed by: PHYSICIAN ASSISTANT

## 2025-04-09 NOTE — PROGRESS NOTES
Name: Jenny Rodrigues      : 1995      MRN: [id number]  Encounter Provider: Paola Phillips PA-C  Encounter Date: 2025   Encounter department: NEUROLOGY [facility]  :  Assessment & Plan  Chronic migraine without aura without status migrainosus, not intractable  Hold Quipta.  Try botox, s/e reviewed.  She is a good candidate for Botox as she has tried and failed several oral preventatives, and has greater than 15 days out of the month with a migraine, each lasting greater than 4 hours long.  We discussed several supplements including magnesium, co-Q10, melatonin.  Continue to avoid triggers and keep a journal to identify any new triggers or patterns of possible.    Orders:    Botulinum Toxin Type A SOLR 200 Units    Botulinum Toxin Type A SOLR 200 Units      There are no Patient Instructions on file for this visit.       The patient should not hesitate to call me prior to her follow up with any questions or concerns.      History of Present Illness   HPI       Ms. Jenny Rodrigues is a very pleasant 30-year-old right-handed female who is here for neurological follow-up.    migraine follow up, hasn't noticed any changes, has had more frequent headaches, she said she has been on her medication for 3 months and hasn't noticed any changes.      She also sees Dr. Espitia, ?[facility].  The patient follows with rheumatology for SLE, undifferentiated connective tissue disease/systemic lupus on Plaquenil, methotrexate injection, Benlysta infusion.  She also has type 1 insulin-dependent diabetes melitis Dx 2019, Hashimoto's 2020.  Currently dealing with right hip pain and seeing orthopedics, so less active lately due to the pain.        She reports that bending her head forward gives her an electrical shock feeling down the neck.  She reports weakness in the arms and legs.  She feels an electrical shock down the left arm at times.  She was not diagnosed with MS however is following with Dr. Espitia  "and considering CSF studies to rule out CSF vasculitis, etc.     Migraines/headaches:  Onset:She has had headaches since childhood/teenage years and they have recently gotten worse  Head injury: None     Current pain: 2-3/10  Reaches pain level at worst: 8/10  Frequency: She has a low-grade migraine almost all the time, and it increases to the severity of a migraine a few times per week  Duration: Several hours to days  Location: Right frontotemporal or bitemporal/bifrontal  Quality: Throbbing, pulsing, stabbing     Associated with: Some word finding difficulty, feels like she gets stuck and cannot think of the right word, trouble with focus or concentration, right greater than left eye twitching, nausea, sore/stiff neck, photophobia and phonophobia     She does not typically miss work days or social/family activities due to a headache.     Triggers: Unsure     Aura/ warning: blinks lights, spots, tiny long oval, during a headaches     Medications tried:  Prevention-  She has had adverse side effects to antidepressants in the past including SSRIs, Remeron.  She has tried amitriptyline in the past and would like to try this again.  Amitriptyline- s/e (states sometimes antidepr meds cause more anxiety)  Qulipta  Emgality  Depakote  Cymbalta    Abortive-  Tylenol     Other non-medication therapies or treatments-none     Neck pain and description: During a migraine some stiffness or neck pain, and also electric type pain when she bends her neck forward as noted above.  Sleep concerns: None  Family planning: Not family-planning, currently on combination birth control levonorgestrel/ethinyl estradiol as noted on the med list.     Family hx of migraines: mom  Family hx of cerebral aneurysms: none     No excessive caffeine use, no illicit drug use, no excessive alcohol use.     Brain MRI 10/5/2023 is unremarkable for acute changes and suggests \"Unchanged minimal white matter signal changes compared to the prior " "examinations. There is no new dominant white matter lesion or pathologic area of intra-axial enhancement to suggest acute demyelination.\"     Cervical spine MRI with and without contrast 5/3/2024 is unremarkable.            Review of Systems   Constitutional:  Negative for appetite change, fatigue and fever.   HENT: Negative.  Negative for hearing loss, tinnitus, trouble swallowing and voice change.    Eyes: Negative.  Negative for photophobia, pain and visual disturbance.   Respiratory: Negative.  Negative for shortness of breath.    Cardiovascular: Negative.  Negative for palpitations.   Gastrointestinal: Negative.  Negative for nausea and vomiting.   Endocrine: Negative.  Negative for cold intolerance.   Genitourinary: Negative.  Negative for dysuria, frequency and urgency.   Musculoskeletal:  Negative for back pain, gait problem, myalgias, neck pain and neck stiffness.   Skin: Negative.  Negative for rash.   Allergic/Immunologic: Negative.    Neurological:  Positive for headaches. Negative for dizziness, tremors, seizures, syncope, facial asymmetry, speech difficulty, weakness, light-headedness and numbness.   Hematological: Negative.  Does not bruise/bleed easily.   Psychiatric/Behavioral: Negative.  Negative for confusion, hallucinations and sleep disturbance.     I have personally reviewed the MA's review of systems and made changes as necessary.    Pertinent Medical History           Medical History Reviewed by provider this encounter:     .  Past Medical History   Past Medical History:   Diagnosis Date    Abdominal pain     Anaphylaxis     Anxiety     Anxiety and depression     COVID-19 12/2020    Delayed emergence from anesthesia 03/23/2023    Severe episode of emergence delirium (confused, combative) after MAC anesthetic on 03/2023 for EGD. Received versed 2mg, >50mcg precedex, 5mg IV haldol, and 50mcg fentanyl. Waxing and waning episodes of agitation. Any future anesthetics should NOT be done at [facility].    " Delirium, induced by drug     anesthesia    Depression     Diabetes mellitus (HCC)     Disease of thyroid gland     Hashimoto    DKA, type 1 (HCC) 05/11/2021    Eating disorder     history of anorexia/bulemia 5305-4286    Fatigue     Food intolerance     Fracture of fibula     R Salter I    Gilbert disease     Hashimoto's disease 02/21/2020    Head injury     Headache(784.0)     Migraine     Nasal congestion     PTSD (post-traumatic stress disorder)     Rectal bleed     Seizures (HCC)     Type 1 diabetes (HCC)      Past Surgical History:   Procedure Laterality Date    COLONOSCOPY      EGD  03/23/2023    KNEE SURGERY Right 07/06/2020    NASAL SEPTOPLASTY W/ TURBINOPLASTY      SINUS SURGERY      SKIN BIOPSY      TURBINOPLASTY N/A 03/22/2021    Procedure: TURBINOPLASTY;  Surgeon: Jn Hidalgo MD;  Location: [facility];  Service: ENT    UPPER GASTROINTESTINAL ENDOSCOPY      WISDOM TOOTH EXTRACTION      WRIST SURGERY      left; Excision of ganglion     Family History   Problem Relation Age of Onset    Hypertension Mother     Migraines Mother         Headache    Diabetes type II Mother     Varicose Veins Mother     Hyperlipidemia Mother     Diabetes Mother     Arthritis Mother     Depression Mother     Hearing loss Mother     Anxiety disorder Mother     Eczema Father     Cholelithiasis Father     Hypertension Father     Sarcoidosis Father         Liver    Hyperlipidemia Father     Diabetes Father     Coronary artery disease Father     Nephrolithiasis Father     Cirrhosis Father     Alcohol abuse Father     Thyroid disease Sister     Hashimoto's thyroiditis Sister     Alcohol abuse Brother     Cancer Family     Diabetes Family     Hypertension Family     Thyroid disease Sister       reports that she has never smoked. She has never been exposed to tobacco smoke. She has never used smokeless tobacco. She reports that she does not currently use alcohol. She reports that she does not use drugs.  Current Outpatient  "Medications   Medication Instructions    acetaminophen (TYLENOL) 975 mg, Oral, Every 8 hours PRN    albuterol (ProAir HFA) 90 mcg/act inhaler 2 puffs, Inhalation, Every 6 hours PRN    BD Insulin Syringe U/F 31G X 5/16\" 0.5 ML MISC Use as directly with weekly methotrexate injection.    Belimumab (BENLYSTA IV) Over 1 month    benzonatate (TESSALON) 200 mg, Oral, 3 times daily PRN    clindamycin (CLEOCIN T) 1 % lotion 2 times daily    Cyanocobalamin 1,000 mcg, Sublingual, Daily    EPINEPHrine (EPIPEN) 0.3 mg, Intramuscular, Once    famotidine (PEPCID) 40 mg, Oral, Daily at bedtime    fluticasone (FLONASE) 50 mcg/act nasal spray SPRAY 1 SPRAY INTO EACH NOSTRIL EVERY DAY    folic acid (FOLVITE) 1,000 mcg, Oral, Daily    gabapentin (NEURONTIN) 600 mg, Oral, Daily    Glucagon (Gvoke HypoPen 2-Pack) 1 MG/0.2ML SOAJ 1 mg PRN for severe hypoglycemia    Glucagon HCl (Glucagon Emergency) 1 MG/ML SOLR     hydroxychloroquine (PLAQUENIL) 200 mg tablet Take 1 tab twice daily on M-F and 1 tab once daily on the weekend.    Insulin Infusion Pump (T:slim X2 Ins Pump/Control-IQ) ANURAG Use    Insulin Pen Needle (BD PEN NEEDLE NASIM U/F) 32G X 4 MM MISC Does not apply, 4 times daily    ketorolac (TORADOL) 10 mg, Oral, Every 6 hours PRN, Max 2-3 per week.    levonorgestrel-ethinyl estradiol (Altavera) 0.15-30 MG-MCG per tablet 1 tablet, Oral, Daily    levothyroxine 25 mcg, Oral, Daily    LORazepam (ATIVAN PO) Daily PRN    metFORMIN (GLUCOPHAGE-XR) 1,000 mg, Oral, 2 times daily with meals    methotrexate 50 MG/2ML injection INJECT 0.8 ML UNDER THE SKIN ONCE EVERY 7 DAYS. DISCARD UNUSED REMAINDER 30 DAYS AFTER INITIAL USE    Naproxen Sodium (ALEVE PO) Daily PRN    NovoLOG 100 UNIT/ML injection Up to 100 units per day with insulin pump    ondansetron (ZOFRAN-ODT) 4 mg, Oral, Every 6 hours PRN    pantoprazole (PROTONIX) 40 mg, Oral, Daily    promethazine (PHENERGAN) 25 mg, Oral, Every 6 hours PRN    rizatriptan (MAXALT-MLT) 10 mg disintegrating " "tablet 1 tab at migraine onset, repeat after 2 hours if needed; Max 2 per day and 3 per week.    sucralfate (CARAFATE) 1 g, Oral, 4 times daily    Tresiba FlexTouch 32 Units, Subcutaneous, Daily    Tresiba FlexTouch 32 Units, Daily     Allergies   Allergen Reactions    Shellfish-Derived Products - Food Allergy Anaphylaxis     Octopus, sea snails, claims, etc (ask patient)    Insulin Glargine Other (See Comments)     LANTUS BRAND -Burning and redness under skin       Current Outpatient Medications on File Prior to Visit   Medication Sig Dispense Refill    acetaminophen (TYLENOL) 325 mg tablet Take 3 tablets (975 mg total) by mouth every 8 (eight) hours as needed for mild pain or headaches      BD Insulin Syringe U/F 31G X 5/16\" 0.5 ML MISC Use as directly with weekly methotrexate injection. 100 each 0    Belimumab (BENLYSTA IV) Inject into a catheter in a vein every month to absorb continually Switching to monthly on 12/5      EPINEPHrine (EPIPEN) 0.3 mg/0.3 mL SOAJ Inject 0.3 mL (0.3 mg total) into a muscle once for 1 dose 0.6 mL 0    famotidine (PEPCID) 40 MG tablet Take 1 tablet (40 mg total) by mouth daily at bedtime 90 tablet 3    fluticasone (FLONASE) 50 mcg/act nasal spray SPRAY 1 SPRAY INTO EACH NOSTRIL EVERY DAY 48 mL 2    folic acid (FOLVITE) 1 mg tablet TAKE 1 TABLET BY MOUTH EVERY DAY 90 tablet 3    gabapentin (Neurontin) 600 MG tablet Take 1 tablet (600 mg total) by mouth daily 90 tablet 1    Glucagon (Gvoke HypoPen 2-Pack) 1 MG/0.2ML SOAJ 1 mg PRN for severe hypoglycemia 0.4 mL 0    hydroxychloroquine (PLAQUENIL) 200 mg tablet Take 1 tab twice daily on M-F and 1 tab once daily on the weekend. 180 tablet 3    insulin degludec (Tresiba FlexTouch) 100 units/mL injection pen INJECT 32 UNITS DAILY 15 mL 2    Insulin Infusion Pump (T:slim X2 Ins Pump/Control-IQ) ANURAG Use      Insulin Pen Needle (BD PEN NEEDLE NASIM U/F) 32G X 4 MM MISC by Does not apply route 4 (four) times a day for 180 days 400 each 3    " ketorolac (TORADOL) 10 mg tablet Take 1 tablet (10 mg total) by mouth every 6 (six) hours as needed (migraine) Max 2-3 per week. 10 tablet 0    levothyroxine 25 mcg tablet Take 1 tablet (25 mcg total) by mouth daily 90 tablet 3    LORazepam (ATIVAN PO) Take by mouth daily as needed      metFORMIN (GLUCOPHAGE-XR) 500 mg 24 hr tablet Take 2 tablets (1,000 mg total) by mouth 2 (two) times a day with meals 360 tablet 2    methotrexate 50 MG/2ML injection INJECT 0.8 ML UNDER THE SKIN ONCE EVERY 7 DAYS. DISCARD UNUSED REMAINDER 30 DAYS AFTER INITIAL USE 10 mL 3    Naproxen Sodium (ALEVE PO) Take by mouth daily as needed      NovoLOG 100 UNIT/ML injection Up to 100 units per day with insulin pump 90 mL 3    ondansetron (ZOFRAN-ODT) 4 mg disintegrating tablet Take 1 tablet (4 mg total) by mouth every 6 (six) hours as needed for nausea or vomiting for up to 12 doses 12 tablet 0    pantoprazole (PROTONIX) 40 mg tablet TAKE 1 TABLET BY MOUTH EVERY DAY 90 tablet 1    rizatriptan (MAXALT-MLT) 10 mg disintegrating tablet 1 tab at migraine onset, repeat after 2 hours if needed; Max 2 per day and 3 per week. 9 tablet 2    Tresiba FlexTouch 100 units/mL injection pen Inject 32 Units under the skin daily 15 mL 2    [DISCONTINUED] Atogepant (Qulipta) 10 MG TABS 1 tab daily. 90 tablet 0    albuterol (ProAir HFA) 90 mcg/act inhaler Inhale 2 puffs every 6 (six) hours as needed for wheezing or shortness of breath (Patient not taking: Reported on 4/9/2025) 8.5 g 0    benzonatate (TESSALON) 200 MG capsule Take 1 capsule (200 mg total) by mouth 3 (three) times a day as needed for cough (Patient not taking: Reported on 4/9/2025) 20 capsule 0    clindamycin (CLEOCIN T) 1 % lotion Apply 1 application. topically 2 (two) times a day (Patient not taking: Reported on 2/12/2025)      Cyanocobalamin 1000 MCG SUBL Place 1 tablet (1,000 mcg total) under the tongue daily 90 tablet 0    Glucagon HCl (Glucagon Emergency) 1 MG/ML SOLR  (Patient not taking:  "Reported on 12/23/2024)      levonorgestrel-ethinyl estradiol (Altavera) 0.15-30 MG-MCG per tablet TAKE 1 TABLET BY MOUTH EVERY DAY (Patient not taking: Reported on 4/9/2025) 84 tablet 1    promethazine (PHENERGAN) 25 mg tablet Take 1 tablet (25 mg total) by mouth every 6 (six) hours as needed for nausea or vomiting for up to 3 days (Patient not taking: Reported on 3/14/2025) 12 tablet 0    sucralfate (CARAFATE) 1 g tablet Take 1 tablet (1 g total) by mouth 4 (four) times a day (Patient not taking: Reported on 2/12/2025) 40 tablet 0     No current facility-administered medications on file prior to visit.      Social History     Tobacco Use    Smoking status: Never     Passive exposure: Never    Smokeless tobacco: Never    Tobacco comments:     Tobacco smoke exposure (Father smokes cigars)   Vaping Use    Vaping status: Never Used   Substance and Sexual Activity    Alcohol use: Not Currently     Comment: rarely    Drug use: No    Sexual activity: Yes     Partners: Male     Birth control/protection: OCP        Objective   /77 (BP Location: Right arm, Patient Position: Sitting, Cuff Size: Standard)   Pulse 87   Ht 5' 1\" (1.549 m)   Wt 65.3 kg (144 lb)   BMI 27.21 kg/m²     Physical Exam  Neurological Exam  On neurologic exam, the patient is alert and oriented to time and place. Speech is fluent and articulate, and the patient follows commands appropriately. Judgment and affect appear normal. Pupils are equally round and reactive to light and extraocular muscles are intact without nystagmus. Face is symmetric, and tongue, uvula, and palate are midline. Hearing is intact. Motor examination reveals intact strength throughout. Normal gait is steady.    Radiology Results Review : No pertinent imaging studies reviewed.    Administrative Statements   I have spent a total time of 20 minutes in caring for this patient on the day of the visit/encounter including Risks and benefits of tx options, Instructions for " management, Patient and family education, Importance of tx compliance, Risk factor reductions, Impressions, Counseling / Coordination of care, Documenting in the medical record, Reviewing/placing orders in the medical record (including tests, medications, and/or procedures), and Obtaining or reviewing history  .

## 2025-04-09 NOTE — LETTER
April 9, 2025     Patient: Jenny Rodrigues  YOB: 1995  Date of Visit: 4/9/2025      To Whom it May Concern:    Jenny Rodrigues is under my professional care. Jenny was seen in my office on 4/9/2025.     If you have any questions or concerns, please don't hesitate to call.         Sincerely,          Paola Phillips PA-C        CC: No Recipients

## 2025-04-09 NOTE — ASSESSMENT & PLAN NOTE
Hold Quipta.  Try botox, s/e reviewed.  She is a good candidate for Botox as she has tried and failed several oral preventatives, and has greater than 15 days out of the month with a migraine, each lasting greater than 4 hours long.  We discussed several supplements including magnesium, co-Q10, melatonin.  Continue to avoid triggers and keep a journal to identify any new triggers or patterns of possible.    Orders:    Botulinum Toxin Type A SOLR 200 Units    Botulinum Toxin Type A SOLR 200 Units     Complex Repair And Xenograft Text: The defect edges were debeveled with a #15 scalpel blade.  The primary defect was closed partially with a complex linear closure.  Given the location of the defect, shape of the defect and the proximity to free margins a xenograft was deemed most appropriate to repair the remaining defect.  The graft was trimmed to fit the size of the remaining defect.  The graft was then placed in the primary defect, oriented appropriately, and sutured into place.

## 2025-04-11 ENCOUNTER — HOSPITAL ENCOUNTER (OUTPATIENT)
Dept: INFUSION CENTER | Facility: CLINIC | Age: 30
End: 2025-04-11
Payer: COMMERCIAL

## 2025-04-11 VITALS
HEART RATE: 91 BPM | TEMPERATURE: 98.3 F | WEIGHT: 144.5 LBS | SYSTOLIC BLOOD PRESSURE: 100 MMHG | BODY MASS INDEX: 27.3 KG/M2 | OXYGEN SATURATION: 98 % | DIASTOLIC BLOOD PRESSURE: 68 MMHG | RESPIRATION RATE: 18 BRPM

## 2025-04-11 DIAGNOSIS — M32.9 SYSTEMIC LUPUS ERYTHEMATOSUS, UNSPECIFIED SLE TYPE, UNSPECIFIED ORGAN INVOLVEMENT STATUS (HCC): Primary | ICD-10-CM

## 2025-04-11 PROCEDURE — 96413 CHEMO IV INFUSION 1 HR: CPT

## 2025-04-11 RX ORDER — DIPHENHYDRAMINE HCL 25 MG
25 TABLET ORAL ONCE
Status: COMPLETED | OUTPATIENT
Start: 2025-04-11 | End: 2025-04-11

## 2025-04-11 RX ORDER — ACETAMINOPHEN 325 MG/1
650 TABLET ORAL ONCE
OUTPATIENT
Start: 2025-05-09

## 2025-04-11 RX ORDER — SODIUM CHLORIDE 9 MG/ML
20 INJECTION, SOLUTION INTRAVENOUS ONCE
OUTPATIENT
Start: 2025-05-09

## 2025-04-11 RX ORDER — ACETAMINOPHEN 325 MG/1
650 TABLET ORAL ONCE
Status: COMPLETED | OUTPATIENT
Start: 2025-04-11 | End: 2025-04-11

## 2025-04-11 RX ORDER — DIPHENHYDRAMINE HCL 25 MG
25 TABLET ORAL ONCE
OUTPATIENT
Start: 2025-05-09

## 2025-04-11 RX ORDER — SODIUM CHLORIDE 9 MG/ML
20 INJECTION, SOLUTION INTRAVENOUS ONCE
Status: COMPLETED | OUTPATIENT
Start: 2025-04-11 | End: 2025-04-11

## 2025-04-11 RX ADMIN — ACETAMINOPHEN 650 MG: 325 TABLET ORAL at 14:04

## 2025-04-11 RX ADMIN — SODIUM CHLORIDE 20 ML/HR: 0.9 INJECTION, SOLUTION INTRAVENOUS at 14:05

## 2025-04-11 RX ADMIN — BELIMUMAB 640 MG: 400 INJECTION, POWDER, LYOPHILIZED, FOR SOLUTION INTRAVENOUS at 15:07

## 2025-04-11 RX ADMIN — DIPHENHYDRAMINE HYDROCHLORIDE 25 MG: 25 TABLET ORAL at 14:04

## 2025-04-11 NOTE — LETTER
[facility]  [address]  [address]  [place] [address]  Dept: [phone]    April 11, 2025     Patient: Jenny Rodrigues   YOB: 1995   Date of Visit: 4/11/2025       To Whom it May Concern:    Jenny Rodrigues is under my professional care. She was seen in the hospital from 4/11/2025 to 04/11/25. She may return to work on 4/12/2025 without limitations.    If you have any questions or concerns, please don't hesitate to call.         Sincerely,          Alicia Zepeda RN

## 2025-04-11 NOTE — PROGRESS NOTES
Pt at [facility] for Benlysta infusion. Pt denies any s/s of infection or antibiotic use. PIV 24 gauge to left hand patent and good blood return. Pt has no further questions at this time. Call bell within reach.

## 2025-04-11 NOTE — PROGRESS NOTES
Pt tolerated tx well with no complaint. Next appt scheduled for 5/9 at 1430, [place]. AVS declined.

## 2025-04-21 LAB
DME PARACHUTE DELIVERY DATE ACTUAL: NORMAL
DME PARACHUTE DELIVERY DATE REQUESTED: NORMAL
DME PARACHUTE ITEM DESCRIPTION: NORMAL
DME PARACHUTE ORDER STATUS: NORMAL
DME PARACHUTE SUPPLIER NAME: NORMAL
DME PARACHUTE SUPPLIER PHONE: NORMAL

## 2025-05-09 ENCOUNTER — HOSPITAL ENCOUNTER (OUTPATIENT)
Dept: INFUSION CENTER | Facility: CLINIC | Age: 30
End: 2025-05-09
Attending: INTERNAL MEDICINE
Payer: COMMERCIAL

## 2025-05-09 VITALS
OXYGEN SATURATION: 99 % | TEMPERATURE: 97.5 F | BODY MASS INDEX: 27.78 KG/M2 | HEART RATE: 85 BPM | DIASTOLIC BLOOD PRESSURE: 72 MMHG | SYSTOLIC BLOOD PRESSURE: 106 MMHG | WEIGHT: 147 LBS

## 2025-05-09 DIAGNOSIS — M32.9 SYSTEMIC LUPUS ERYTHEMATOSUS, UNSPECIFIED SLE TYPE, UNSPECIFIED ORGAN INVOLVEMENT STATUS (HCC): Primary | ICD-10-CM

## 2025-05-09 PROCEDURE — 96413 CHEMO IV INFUSION 1 HR: CPT

## 2025-05-09 RX ORDER — ACETAMINOPHEN 325 MG/1
650 TABLET ORAL ONCE
Status: COMPLETED | OUTPATIENT
Start: 2025-05-09 | End: 2025-05-09

## 2025-05-09 RX ORDER — SODIUM CHLORIDE 9 MG/ML
20 INJECTION, SOLUTION INTRAVENOUS ONCE
OUTPATIENT
Start: 2025-06-06

## 2025-05-09 RX ORDER — DIPHENHYDRAMINE HCL 25 MG
25 TABLET ORAL ONCE
Status: COMPLETED | OUTPATIENT
Start: 2025-05-09 | End: 2025-05-09

## 2025-05-09 RX ORDER — SODIUM CHLORIDE 9 MG/ML
20 INJECTION, SOLUTION INTRAVENOUS ONCE
Status: COMPLETED | OUTPATIENT
Start: 2025-05-09 | End: 2025-05-09

## 2025-05-09 RX ORDER — ACETAMINOPHEN 325 MG/1
650 TABLET ORAL ONCE
OUTPATIENT
Start: 2025-06-06

## 2025-05-09 RX ORDER — DIPHENHYDRAMINE HCL 25 MG
25 TABLET ORAL ONCE
OUTPATIENT
Start: 2025-06-06

## 2025-05-09 RX ADMIN — ACETAMINOPHEN 650 MG: 325 TABLET ORAL at 14:56

## 2025-05-09 RX ADMIN — SODIUM CHLORIDE 20 ML/HR: 0.9 INJECTION, SOLUTION INTRAVENOUS at 14:56

## 2025-05-09 RX ADMIN — BELIMUMAB 640 MG: 400 INJECTION, POWDER, LYOPHILIZED, FOR SOLUTION INTRAVENOUS at 15:45

## 2025-05-09 RX ADMIN — DIPHENHYDRAMINE HYDROCHLORIDE 25 MG: 25 TABLET ORAL at 14:56

## 2025-05-09 NOTE — PROGRESS NOTES
Pt tolerated treatment well with no complications. IV removed and gauze dressing applied. Pt aware of future appt on 6/6/25 at 230. AVS declined.

## 2025-05-09 NOTE — LETTER
[facility]  [address]  [address]  [place] [address]  Dept: [phone]    May 9, 2025     Patient: Jenny Rodrigues   YOB: 1995   Date of Visit: 5/9/2025       To Whom it May Concern:    Jenny Rodrigues is under my professional care. She was treated in the infusion center on 5/9/2025.     If you have any questions or concerns, please don't hesitate to call.         Sincerely,          Lakisha Lagunas RN

## 2025-05-14 NOTE — PROGRESS NOTES
Chief complaint:    History: Pleasant 57-year-old female here for follow-up office visit.  She complains of  left>right sided neck pain and heavy achy pain between her shoulder blades with sharp shooting, sharp pain into the right deltoid into the ulnar forearm right upper extremity into dorsal surface of the hand into the little, ring and long fingers which has become more persistent.  Has numbness dorsal aspect of the right hand into the little, ring and middle finger the past month or two.  The right arm feels heavy and she has been dropping things more the past 6 months.  She denies nocturnal ataxia or problems with balance when she closes her eyes and washes her hair.  No fevers or chills.  Notices worsening symptoms when she is driving, lifting and with head movement.  She has had physical therapy and has been doing the home exercises and does Pilates 3 times a week.  She continues to take tramadol 50 mg 1 to 2 tablets 3 times a day, pregabalin 200 mg twice a day and Keppra 1500 mg twice a day without side effect of sedation, dizziness, confusion.  Also takes cyclobenzaprine 10 mg 1/2 to 1 tablet up to 3 times a day as needed without side effect of sedation or dizziness.  She has had to call into work on a couple occasions due to her increased neck and right upper extremity pain which is poorly controlled at this time.    Current Outpatient Medications   Medication Sig    methylPREDNISolone (MEDROL, ELEANOR,) 4 MG tablet Take 1 tablet by mouth as directed for 6 days. Indications: acute neck pain follow package directions    levetiracetam (KEPPRA) 1000 MG tablet Take 1 tablet by mouth in the morning and 1 tablet in the evening. Take with 500mg tablet for total of 1500mg BID    levETIRAcetam (KepPRA) 500 MG tablet TAKE 1 TABLET BY MOUTH TWICE DAILY. TAKE WITH 1000 MG TABLET FOR A TOTAL DOSE OF 1500 MG TWICE DAILY. DRIVING PRECAUTIONS    traMADol (ULTRAM) 50 MG tablet Take 1-2 tablets by mouth every 6 hours as needed  HPI: Met with Jitendra Celeste and her boyfriend for Carb Counting and glucagon instruction  Jitendra Celeste was instructed on glucagon previously, however, she did not have anyone with her at that appointment  Emphasized the importance of having a support person accompany her who could be instructed on glucagon as well  Her boyfriend accompanied her at today's visit and they requested a Mandarin  to be available for this visit  They declined  services for Carb Counting and accepted using a Mandarin  for Glucagon instruction   # [id number] was used for glucagon instruction  Glucagon teaching    Discussed the use of glucagon, reason for use, preparation and administration of injection, and post injection recommendations  I demonstrated glucagon use with demo kit in office, her significant other demonstrated use of the kit back to me  Has good understanding of use  Suggest that Jitendra Celeste gets a glucagon kit to keep for emergencies  Marisa Llanos to notify the doctor if a severe low requiring glucagon is needed  Carbohydrate Counting Instruction    Met with Jitendra Moraless for carbohydrate counting  Jitendra Celeste is currently on the following insulin regimen: Novolog 4 units before meals plus sliding scale of 1 unit for every 50 mg/dL over target of 150 mg/dL; Lantus 8 units under the skin at bedtime  Present at Session: patient and her boyfriend     Patient Instructed on: Carbohydrate counting  Used [employer], Food labels, measuring cups, and other props to teach carbohydrate counting  Patient completed one day food diary exercise during session with some difficulty and needed some assistance  Hamusman Celeste had some confusion regarding whether some foods should be counted as carbohydrates as well as when and how to correctly subtract fiber  She was successful with accurately adjusting portions based on varying amounts of carbohydrate foods      At this time, Jitendra Celeste does demonstrate for Pain. Begin taking on March 28, 2025.    pregabalin (LYRICA) 200 MG capsule Begin taking on March 28, 2025. Take 1 capsule by mouth twice daily    cyclobenzaprine (FLEXERIL) 10 MG tablet TAKE 1/2 TO 1 (ONE-HALF TO ONE) TABLET BY MOUTH THREE TIMES DAILY AS NEEDED FOR MUSCLE SPASM    diazePAM (VALIUM) 5 MG tablet Take 1 tablet by mouth 45 minutes prior to procedure. Must have transportation to and from procedure.    LORazepam (ATIVAN) 1 MG tablet Take 1 mg by mouth.    Multiple Vitamins-Minerals (HM MULTIVITAMIN ADULT GUMMY PO) Take 1 mg by mouth daily.    lansoprazole (PREVACID) 15 MG capsule Take 15 mg by mouth daily.    zolpidem (AMBIEN) 5 MG tablet Take 5 mg by mouth nightly as needed for Sleep. Take 1 tablet every day at bedtime as needed    Calcium Carbonate (CALCIUM 500 PO) Take  by mouth daily.      VITAMIN D, CHOLECALCIFEROL, PO Take 1,000 Units by mouth daily.       No current facility-administered medications for this visit.     ALLERGIES:   Allergen Reactions    Contrast Media HIVES     Mri dye not ct dye    Amoxicillin      Caused stomach problems    Gadolinium HIVES      Physical Exam:  Vitals:    05/14/25 0808   BP: 118/77   Pulse: 94     General: Alert and oriented.  Affect normal.  Lungs: respirations non-labored  Palpation:  no cervical spinous process tenderness.  Mild tenderness right cervical paraspinals and upper traps  Spurling's on right recreates ipsilateral neck pain  Extension with axial rotation to the right side recreates pain in the right interscapular region. Spurling's negative on left  Manual muscle testing 5/5 and symmetrical in bilateral upper extremities with exception to right triceps which is a 4+/5 as compared to 5/5 on the left. Mild weakness finger intrinsics right hand as compared to finger intrinsics on left  Deep tendon reflexes 2+ and symmetrical bilaterally at the biceps, brachial radialis and triceps  Avni's negative left and right sides    MRI cervical spine wo  the math skills necessary for successful carbohydrate counting  She needs to become more familiar with which foods to count as carbohydrates  Discussed with Elvin Munoz that becoming more comfortable with her set doses and sliding scale is advisable at this time  This is important due to the fact that Elvin Munoz is currently not administering her own insulin or checking her own BG, her boyfriend is performing these tasks for her  Elvin Munoz did inquire about insulin pumps as well as CGMs  Informed Elvin Munoz that the intermediate of flexible insulin is often used before a pump and that flexible insulin may be a goal to work towards  Patient asked if there was a way to try a CGM to see if she would want to pursue a personal model  Advised patient that there are diagnostic CGMs that can be utilized  Patient asked to be referred for diagnostic CGM  Diabetes Education Record  Elvin Munoz received the following handouts: Portion Book, 3 day food logs to be returned to the office to further assess carbohydrate counting  Patient response to instruction    Comprehensionfair  Motivationgood  Expected Compliancegood    Patient is scheduled for initial MNT as she wishes to address the healthiness of her diet overall  Thank you for referring your patient to [facility], it was a pleasure working with them today  Please feel free to call with any questions or concerns      Dorie Lal, [address] [place] [address] [address] [place] [id number] contrast 5/08/25:  FINDINGS:     There is straightening of the cervical spine. There is anterior spinal  fusion from C4-C7.     The cord signal is grossly normal though somewhat limited evaluation due to  susceptibility artifact from subjacent hardware.     There is no marrow edema.     C2-3: Normal     C3-4: There is a mild broad-based disc bulge with mild effacement of the  ventral thecal sac.     C4-5: Normal     C5-6: There is no canal or foraminal compromise     C6-7: There is mild posterior osseous ridging with minimal effacement of  the ventral thecal sac. There is minimal right foraminal narrowing.     C7-T1: There is a minimal broad-based disc bulge.        IMPRESSION:          1. Redemonstration of cervical fusion from C4-C7.     2. Mild degenerative disc disease at the junctional C3-4 level.    Impression:    Cervical radiculopathy-pain poorly controlled despite conservative treatment including physical therapy and regular home exercise program and medications.  She has had cervical epid[facility] with good pain relief for 3 months.  This was prior to her last surgery.  She is interested in repeat cervical epidural steroid injection.  She is aware that due to her previous anterior cervical fusion surgery,  there is an increased risk of spinal puncture and paralysis. While this is unlikely the risk is not zero.  She understands and wishes to schedule the injection.       Plan:    Continue home exercise program indefinitely  Reviewed cervical MRI imaging and results  Medrol dose pack written to down regulate acute pain flare  Continue Keppra, cyclobenzaprine, Lyrica 200 mg twice daily (insurance covers only 20 days at a time) and tramadol 50 mg 1-2 tablets every 8 hrs as needed  Schedule cervical epidural steroid injection  at C7-T1 or more caudal level    Nia Fan PA-C  Supervising physician is Dr. Narciso Decker  Physical Medicine and  Rehabilitation, Sports Medicine, Pain Management  [facility]

## 2025-05-26 DIAGNOSIS — R10.10 PAIN OF UPPER ABDOMEN: ICD-10-CM

## 2025-05-26 DIAGNOSIS — R10.13 DYSPEPSIA: ICD-10-CM

## 2025-05-27 ENCOUNTER — TELEPHONE (OUTPATIENT)
Age: 30
End: 2025-05-27

## 2025-05-27 RX ORDER — PANTOPRAZOLE SODIUM 40 MG/1
40 TABLET, DELAYED RELEASE ORAL DAILY
Qty: 90 TABLET | Refills: 1 | Status: SHIPPED | OUTPATIENT
Start: 2025-05-27

## 2025-05-27 NOTE — TELEPHONE ENCOUNTER
Patient called to reschedule a preconception appointment she had with [facility] on 4/9/25.  Can someone call her to set up?

## 2025-06-06 ENCOUNTER — HOSPITAL ENCOUNTER (OUTPATIENT)
Dept: INFUSION CENTER | Facility: CLINIC | Age: 30
End: 2025-06-06
Attending: INTERNAL MEDICINE
Payer: COMMERCIAL

## 2025-06-06 VITALS
BODY MASS INDEX: 26.93 KG/M2 | SYSTOLIC BLOOD PRESSURE: 107 MMHG | RESPIRATION RATE: 16 BRPM | WEIGHT: 142.5 LBS | HEART RATE: 82 BPM | OXYGEN SATURATION: 98 % | DIASTOLIC BLOOD PRESSURE: 74 MMHG | TEMPERATURE: 97.6 F

## 2025-06-06 DIAGNOSIS — M32.9 SYSTEMIC LUPUS ERYTHEMATOSUS, UNSPECIFIED SLE TYPE, UNSPECIFIED ORGAN INVOLVEMENT STATUS (HCC): Primary | ICD-10-CM

## 2025-06-06 PROCEDURE — 96413 CHEMO IV INFUSION 1 HR: CPT

## 2025-06-06 RX ORDER — DIPHENHYDRAMINE HCL 25 MG
25 TABLET ORAL ONCE
OUTPATIENT
Start: 2025-07-04

## 2025-06-06 RX ORDER — ACETAMINOPHEN 325 MG/1
650 TABLET ORAL ONCE
OUTPATIENT
Start: 2025-07-04

## 2025-06-06 RX ORDER — ACETAMINOPHEN 325 MG/1
650 TABLET ORAL ONCE
Status: COMPLETED | OUTPATIENT
Start: 2025-06-06 | End: 2025-06-06

## 2025-06-06 RX ORDER — DIPHENHYDRAMINE HCL 25 MG
25 TABLET ORAL ONCE
Status: COMPLETED | OUTPATIENT
Start: 2025-06-06 | End: 2025-06-06

## 2025-06-06 RX ORDER — SODIUM CHLORIDE 9 MG/ML
20 INJECTION, SOLUTION INTRAVENOUS ONCE
OUTPATIENT
Start: 2025-07-04

## 2025-06-06 RX ORDER — SODIUM CHLORIDE 9 MG/ML
20 INJECTION, SOLUTION INTRAVENOUS ONCE
Status: COMPLETED | OUTPATIENT
Start: 2025-06-06 | End: 2025-06-06

## 2025-06-06 RX ADMIN — ACETAMINOPHEN 650 MG: 325 TABLET ORAL at 14:00

## 2025-06-06 RX ADMIN — SODIUM CHLORIDE 20 ML/HR: 0.9 INJECTION, SOLUTION INTRAVENOUS at 13:57

## 2025-06-06 RX ADMIN — DIPHENHYDRAMINE HYDROCHLORIDE 25 MG: 25 TABLET ORAL at 14:00

## 2025-06-06 RX ADMIN — BELIMUMAB 640 MG: 400 INJECTION, POWDER, LYOPHILIZED, FOR SOLUTION INTRAVENOUS at 14:50

## 2025-06-06 NOTE — LETTER
[facility]  [address]  [address] [facility]  [place] [address]  Dept: [phone]    June 6, 2025     Patient: Jenny Rodrigues   YOB: 1995   Date of Visit: 6/6/2025       To Whom it May Concern:    Jenny Rodrigues is under my professional care. She was seen in the hospital from 6/6/2025 to 06/06/25. She may return to work on 6/7/25 without limitations.    If you have any questions or concerns, please don't hesitate to call.         Sincerely,          Bettie Caba RN

## 2025-06-06 NOTE — PROGRESS NOTES
Patient tolerated her treatment without any adverse reactions. Next appointment confirmed 7/11/25 at 1430 at [facility]. Patient declined avs

## 2025-06-06 NOTE — PROGRESS NOTES
Patient is here for [facility] and offers no complaints. Patient denies any s/s of infection or being on antibiotics at this time

## 2025-06-09 ENCOUNTER — TELEPHONE (OUTPATIENT)
Dept: NEUROLOGY | Facility: CLINIC | Age: 30
End: 2025-06-09

## 2025-06-09 ENCOUNTER — APPOINTMENT (OUTPATIENT)
Dept: LAB | Age: 30
End: 2025-06-09
Payer: COMMERCIAL

## 2025-06-09 DIAGNOSIS — E10.65 TYPE 1 DIABETES MELLITUS WITH HYPERGLYCEMIA (HCC): ICD-10-CM

## 2025-06-09 DIAGNOSIS — M32.9 SYSTEMIC LUPUS ERYTHEMATOSUS, UNSPECIFIED SLE TYPE, UNSPECIFIED ORGAN INVOLVEMENT STATUS (HCC): ICD-10-CM

## 2025-06-09 LAB
ALBUMIN SERPL BCG-MCNC: 4.3 G/DL (ref 3.5–5)
ALP SERPL-CCNC: 74 U/L (ref 34–104)
ALT SERPL W P-5'-P-CCNC: 11 U/L (ref 7–52)
ANION GAP SERPL CALCULATED.3IONS-SCNC: 8 MMOL/L (ref 4–13)
AST SERPL W P-5'-P-CCNC: 17 U/L (ref 13–39)
BACTERIA UR QL AUTO: ABNORMAL /HPF
BASOPHILS # BLD AUTO: 0.03 THOUSANDS/ÂΜL (ref 0–0.1)
BASOPHILS NFR BLD AUTO: 1 % (ref 0–1)
BILIRUB SERPL-MCNC: 0.61 MG/DL (ref 0.2–1)
BILIRUB UR QL STRIP: NEGATIVE
BUN SERPL-MCNC: 9 MG/DL (ref 5–25)
C3 SERPL-MCNC: 114 MG/DL (ref 87–200)
C4 SERPL-MCNC: 17 MG/DL (ref 19–52)
CALCIUM SERPL-MCNC: 9 MG/DL (ref 8.4–10.2)
CHLORIDE SERPL-SCNC: 105 MMOL/L (ref 96–108)
CLARITY UR: CLEAR
CO2 SERPL-SCNC: 27 MMOL/L (ref 21–32)
COLOR UR: ABNORMAL
CREAT SERPL-MCNC: 0.74 MG/DL (ref 0.6–1.3)
CREAT UR-MCNC: 127.7 MG/DL
CRP SERPL QL: 1.6 MG/L
EOSINOPHIL # BLD AUTO: 0.11 THOUSAND/ÂΜL (ref 0–0.61)
EOSINOPHIL NFR BLD AUTO: 2 % (ref 0–6)
ERYTHROCYTE [DISTWIDTH] IN BLOOD BY AUTOMATED COUNT: 13.5 % (ref 11.6–15.1)
ERYTHROCYTE [SEDIMENTATION RATE] IN BLOOD: 5 MM/HOUR (ref 0–19)
EST. AVERAGE GLUCOSE BLD GHB EST-MCNC: 151 MG/DL
GFR SERPL CREATININE-BSD FRML MDRD: 110 ML/MIN/1.73SQ M
GLUCOSE P FAST SERPL-MCNC: 191 MG/DL (ref 65–99)
GLUCOSE UR STRIP-MCNC: ABNORMAL MG/DL
HBA1C MFR BLD: 6.9 %
HCT VFR BLD AUTO: 39.2 % (ref 34.8–46.1)
HGB BLD-MCNC: 12.6 G/DL (ref 11.5–15.4)
HGB UR QL STRIP.AUTO: NEGATIVE
IMM GRANULOCYTES # BLD AUTO: 0.02 THOUSAND/UL (ref 0–0.2)
IMM GRANULOCYTES NFR BLD AUTO: 0 % (ref 0–2)
KETONES UR STRIP-MCNC: NEGATIVE MG/DL
LEUKOCYTE ESTERASE UR QL STRIP: NEGATIVE
LYMPHOCYTES # BLD AUTO: 2.19 THOUSANDS/ÂΜL (ref 0.6–4.47)
LYMPHOCYTES NFR BLD AUTO: 43 % (ref 14–44)
MCH RBC QN AUTO: 27.5 PG (ref 26.8–34.3)
MCHC RBC AUTO-ENTMCNC: 32.1 G/DL (ref 31.4–37.4)
MCV RBC AUTO: 86 FL (ref 82–98)
MONOCYTES # BLD AUTO: 0.48 THOUSAND/ÂΜL (ref 0.17–1.22)
MONOCYTES NFR BLD AUTO: 10 % (ref 4–12)
MUCOUS THREADS UR QL AUTO: ABNORMAL
NEUTROPHILS # BLD AUTO: 2.23 THOUSANDS/ÂΜL (ref 1.85–7.62)
NEUTS SEG NFR BLD AUTO: 44 % (ref 43–75)
NITRITE UR QL STRIP: NEGATIVE
NON-SQ EPI CELLS URNS QL MICRO: ABNORMAL /HPF
NRBC BLD AUTO-RTO: 0 /100 WBCS
PH UR STRIP.AUTO: 6 [PH]
PLATELET # BLD AUTO: 191 THOUSANDS/UL (ref 149–390)
PMV BLD AUTO: 12.1 FL (ref 8.9–12.7)
POTASSIUM SERPL-SCNC: 4.3 MMOL/L (ref 3.5–5.3)
PROT SERPL-MCNC: 6.7 G/DL (ref 6.4–8.4)
PROT UR STRIP-MCNC: ABNORMAL MG/DL
PROT UR-MCNC: 7.3 MG/DL
PROT/CREAT UR: 0.1 MG/G{CREAT}
RBC # BLD AUTO: 4.58 MILLION/UL (ref 3.81–5.12)
RBC #/AREA URNS AUTO: ABNORMAL /HPF
SODIUM SERPL-SCNC: 140 MMOL/L (ref 135–147)
SP GR UR STRIP.AUTO: 1.02 (ref 1–1.03)
UROBILINOGEN UR STRIP-ACNC: <2 MG/DL
WBC # BLD AUTO: 5.06 THOUSAND/UL (ref 4.31–10.16)
WBC #/AREA URNS AUTO: ABNORMAL /HPF

## 2025-06-09 PROCEDURE — 85025 COMPLETE CBC W/AUTO DIFF WBC: CPT

## 2025-06-09 PROCEDURE — 83036 HEMOGLOBIN GLYCOSYLATED A1C: CPT

## 2025-06-09 PROCEDURE — 82570 ASSAY OF URINE CREATININE: CPT

## 2025-06-09 PROCEDURE — 86225 DNA ANTIBODY NATIVE: CPT

## 2025-06-09 PROCEDURE — 86160 COMPLEMENT ANTIGEN: CPT

## 2025-06-09 PROCEDURE — 86140 C-REACTIVE PROTEIN: CPT

## 2025-06-09 PROCEDURE — 81001 URINALYSIS AUTO W/SCOPE: CPT

## 2025-06-09 PROCEDURE — 80053 COMPREHEN METABOLIC PANEL: CPT

## 2025-06-09 PROCEDURE — 85652 RBC SED RATE AUTOMATED: CPT

## 2025-06-09 PROCEDURE — 84156 ASSAY OF PROTEIN URINE: CPT

## 2025-06-09 PROCEDURE — 36415 COLL VENOUS BLD VENIPUNCTURE: CPT

## 2025-06-09 NOTE — TELEPHONE ENCOUNTER
Left message to schedule her botox start in CV there is availability next week to schedule patient.

## 2025-06-10 ENCOUNTER — RESULTS FOLLOW-UP (OUTPATIENT)
Dept: ENDOCRINOLOGY | Facility: CLINIC | Age: 30
End: 2025-06-10

## 2025-06-11 ENCOUNTER — OFFICE VISIT (OUTPATIENT)
Dept: RHEUMATOLOGY | Facility: CLINIC | Age: 30
End: 2025-06-11

## 2025-06-11 VITALS
BODY MASS INDEX: 26.5 KG/M2 | WEIGHT: 144 LBS | SYSTOLIC BLOOD PRESSURE: 110 MMHG | HEIGHT: 62 IN | OXYGEN SATURATION: 99 % | HEART RATE: 103 BPM | DIASTOLIC BLOOD PRESSURE: 80 MMHG

## 2025-06-11 DIAGNOSIS — Z79.60 LONG-TERM USE OF IMMUNOSUPPRESSANT MEDICATION: ICD-10-CM

## 2025-06-11 DIAGNOSIS — L50.9 HIVES: ICD-10-CM

## 2025-06-11 DIAGNOSIS — Z79.631 METHOTREXATE, LONG TERM, CURRENT USE: ICD-10-CM

## 2025-06-11 DIAGNOSIS — Z79.899 LONG-TERM USE OF PLAQUENIL: ICD-10-CM

## 2025-06-11 DIAGNOSIS — M79.7 FIBROMYALGIA: ICD-10-CM

## 2025-06-11 DIAGNOSIS — R21 SKIN RASH: ICD-10-CM

## 2025-06-11 DIAGNOSIS — M32.9 SYSTEMIC LUPUS ERYTHEMATOSUS, UNSPECIFIED SLE TYPE, UNSPECIFIED ORGAN INVOLVEMENT STATUS (HCC): Primary | ICD-10-CM

## 2025-06-11 DIAGNOSIS — E10.9 TYPE 1 DIABETES MELLITUS WITHOUT COMPLICATION (HCC): ICD-10-CM

## 2025-06-11 DIAGNOSIS — E06.3 HASHIMOTO'S DISEASE: ICD-10-CM

## 2025-06-11 RX ORDER — ONABOTULINUMTOXINA 200 [USP'U]/1
INJECTION, POWDER, LYOPHILIZED, FOR SOLUTION INTRADERMAL; INTRAMUSCULAR
COMMUNITY
Start: 2025-05-23

## 2025-06-11 NOTE — ASSESSMENT & PLAN NOTE
Ms. Rodrigues is a 30-year-old female with history significant for type 1 insulin-dependent diabetes mellitus diagnosed in 2019 and Hashimoto's thyroiditis diagnosed in 2020 who presents for a follow-up of an undifferentiated connective tissue disorder (diagnosed based on a positive SOHAM 1:80 nuclear, speckled, homogeneous patterns, arthralgias, malar rash/photosensitivity)/systemic lupus.  She is currently on hydroxychloroquine 200 mg twice daily on the weekdays and once daily on the weekend that was started in July 2021, injectable methotrexate 20 mg once weekly and belimumab infusions 10 mg/kg every 4 weeks that was added in September 2023.     # Undifferentiated connective tissue disease/systemic lupus  - Jenny presents today for a follow-up of an undifferentiated connective tissue disease/systemic lupus for which she is currently on hydroxychloroquine 200 mg twice daily on the weekdays and 200 mg once daily on the weekend, subcutaneous methotrexate 20 mg once weekly and belimumab infusions on a monthly basis.  She has overall been stable, but states since the last visit for the past 4 months she has been experiencing patches of skin erythema with pruritus, that I cannot associate with her underlying connective tissue disease.  I will refer her to allergy for an evaluation.  Otherwise, she has fluctuation in her chronic joint pains which she manages with gabapentin 600 mg nightly, Tylenol on 500 mg twice a day as needed and arthritis Advil as needed.  I do not see an indication to change her DMARDs currently.  She is due to update her monitoring labs to assess for drug toxicity prior to the follow up and will see ophthalmology for bi-annual eye exams.     - Of note she is aware that methotrexate is teratogenic and I recommend discontinuing this 3 months prior to planning conception.  We also discussed there is insufficient data on the safety of belimumab during pregnancy and around the time of pregnancy planning  we will need to have a discussion regarding the risks and benefits.  I will likely discontinue the medication.  We discussed possibly switching her to azathioprine in this case.  In the meanwhile she will continue with barrier contraception as she was unable to tolerate oral contraceptives.  She has an upcoming appointment with preconception counseling in July.     - In regards to the right hip pain I do not suspect this to be related to a rheumatic issue - there is no evidence of an inflammatory arthritis on the MRI of the hip and her symptoms did not improve with immunosuppressive agents.  She can continue follow up with Orthopedics to determine the next steps in management.  An intra-articular non-steroidal medication can be considered for short term relief if she prefers to delay surgery.  If surgery is considered we will need to review use of her immunosuppressive medications in the perioperative period.     Orders:    CBC and differential; Future    Comprehensive metabolic panel; Future    C-reactive protein; Future    Sedimentation rate, automated; Future    C4 complement; Future    C3 complement; Future    Anti-DNA antibody, double-stranded; Future    Urinalysis with microscopic; Future    Protein / creatinine ratio, urine; Future

## 2025-06-11 NOTE — ASSESSMENT & PLAN NOTE
- We will need to review with MFM if gabapentin needs to be discontinued prior to conception.

## 2025-06-11 NOTE — PROGRESS NOTES
Ambulatory Visit  Name: Jenny Rodrigues      : 1995      MRN: [id number]  Encounter Provider: Chelle Tanner MD  Encounter Date: 2025   Encounter department: [facility] [place]    Assessment & Plan  Systemic lupus erythematosus, unspecified SLE type, unspecified organ involvement status (HCC)  Ms. Rodrigues is a 30-year-old female with history significant for type 1 insulin-dependent diabetes mellitus diagnosed in  and Hashimoto's thyroiditis diagnosed in  who presents for a follow-up of an undifferentiated connective tissue disorder (diagnosed based on a positive SOHAM 1:80 nuclear, speckled, homogeneous patterns, arthralgias, malar rash/photosensitivity)/systemic lupus.  She is currently on hydroxychloroquine 200 mg twice daily on the weekdays and once daily on the weekend that was started in 2021, injectable methotrexate 20 mg once weekly and belimumab infusions 10 mg/kg every 4 weeks that was added in 2023.     # Undifferentiated connective tissue disease/systemic lupus  - Jenny presents today for a follow-up of an undifferentiated connective tissue disease/systemic lupus for which she is currently on hydroxychloroquine 200 mg twice daily on the weekdays and 200 mg once daily on the weekend, subcutaneous methotrexate 20 mg once weekly and belimumab infusions on a monthly basis.  She has overall been stable, but states since the last visit for the past 4 months she has been experiencing patches of skin erythema with pruritus, that I cannot associate with her underlying connective tissue disease.  I will refer her to allergy for an evaluation.  Otherwise, she has fluctuation in her chronic joint pains which she manages with gabapentin 600 mg nightly, Tylenol on 500 mg twice a day as needed and arthritis Advil as needed.  I do not see an indication to change her DMARDs currently.  She is due to update her monitoring labs to assess for drug toxicity prior  to the follow up and will see ophthalmology for bi-annual eye exams.     - Of note she is aware that methotrexate is teratogenic and I recommend discontinuing this 3 months prior to planning conception.  We also discussed there is insufficient data on the safety of belimumab during pregnancy and around the time of pregnancy planning we will need to have a discussion regarding the risks and benefits.  I will likely discontinue the medication.  We discussed possibly switching her to azathioprine in this case.  In the meanwhile she will continue with barrier contraception as she was unable to tolerate oral contraceptives.  She has an upcoming appointment with preconception counseling in July.     - In regards to the right hip pain I do not suspect this to be related to a rheumatic issue - there is no evidence of an inflammatory arthritis on the MRI of the hip and her symptoms did not improve with immunosuppressive agents.  She can continue follow up with Orthopedics to determine the next steps in management.  An intra-articular non-steroidal medication can be considered for short term relief if she prefers to delay surgery.  If surgery is considered we will need to review use of her immunosuppressive medications in the perioperative period.     Orders:    CBC and differential; Future    Comprehensive metabolic panel; Future    C-reactive protein; Future    Sedimentation rate, automated; Future    C4 complement; Future    C3 complement; Future    Anti-DNA antibody, double-stranded; Future    Urinalysis with microscopic; Future    Protein / creatinine ratio, urine; Future    Methotrexate, long term, current use         Long-term use of Plaquenil         Long-term use of immunosuppressant medication         Hashimoto's disease         Type 1 diabetes mellitus without complication (HCC)    Lab Results   Component Value Date    HGBA1C 6.9 (H) 06/09/2025            Fibromyalgia  - We will need to review with MFM if  gabapentin needs to be discontinued prior to conception.         Hives    Orders:    Ambulatory Referral to Allergy; Future    Skin rash  - She has been experiencing pruritic patches of macular erythema/hives on a nightly basis for the past 4 months, which is responsive to some degree with the use of antihistamines.  I cannot directly associate this with the underlying connective tissue disease and recommend she establish with allergy for an evaluation.    Orders:    Ambulatory Referral to Allergy; Future      Patient's rheumatologic disease(s) threaten long-term function if not appropriately treated.      I have spent a total time of 40 minutes in caring for this patient on the day of the visit/encounter including Diagnostic results, Risks and benefits of tx options, Instructions for management, Patient and family education, Impressions, Documenting in the medical record, Reviewing / ordering tests, medicine, procedures  , and Obtaining or reviewing history  .      History of Present Illness       INITIAL VISIT NOTE (6/2021):  Ms. Rodrigues is a 26-year-old female with history significant for type 1 insulin-dependent diabetes mellitus diagnosed in 2019 and Hashimoto's thyroiditis (elevated thyroid microsomal and thyroglobulin antibodies) diagnosed in 2020, who presents for further evaluation of a positive SOHAM 1:80 nuclear, speckled, homogeneous patterns and rheumatoid factor of 10, in addition to widespread joint pains.  She is referred by MARGARET Sparks for a rheumatology consult.       Patient reports she started to feel unwell approximately 2 years ago and further evaluation for this led to the diagnosis of type 1 diabetes as well as the Hashimoto's thyroiditis.  She has been managing the diabetes with insulin and states for the hypothyroidism as a result of Hashimoto's thyroiditis she was only started on levothyroxine 25 mcg once daily approximately 1 month ago.  There has been a conflict between the  endocrinologists she has seen in regards to starting the levothyroxine, but due to progressive complaints which the patient and her primary care physician felt was related to hypothyroidism she was finally started on thyroid supplementation 1 month ago.  She has not noticed a change in her symptoms so far and has not had a TSH rechecked yet.  The TSH was slightly elevated at 5.24 on 5/1 prior to starting the levothyroxine.  She is also scheduled to see a private endocrinologist for another opinion.     She reports over the past 1 and half years she has also started to experience joint and muscle pain which has been gradually progressive.  She experiences a degree of pain on a daily basis but states that her symptoms can spontaneously flare-up as well as with changes in the weather, especially when it is cold/damp/humid.  She does feel better with the warmer weather and has also started utilizing a therapy pool which does help with her symptoms.  She has noticed joint pains to affect her hands occasionally but prominently in her wrists.  She will notice pain in her shoulders and hips mostly with manipulation and range of motion.  She describes chronic pain that will also affect her knees as well as in her ankles and feet.  At times she will notice a muscle pain throughout her upper and lower extremities as well.  She has noticed muscle cramps to affect her hands and feet.  No significant joint pains in her elbows or low back.  She has not noticed any obvious swelling of her wrists but feels like they may be swollen as she can gauge this by her bracelet.  She has also noticed swelling to affect her ankles.  She does experience morning stiffness which affects her diffusely and takes about 30-40 minutes to improve.  She tries to avoid Tylenol as this affects her blood glucose monitoring.  She has tried ibuprofen for her symptoms which has not helped significantly.     Other than the body pain she also describes chronic  fatigue, unintentional weight gain and hair thinning.  She denies fevers, unintentional weight loss, focal alopecia, dry eyes, dry mouth, inflammatory eye disease, skin rash, psoriasis, photosensitivity, mouth/nose ulcers, swollen glands, pleuritic chest pain, shortness of breath, inflammatory bowel disease, blood clots (reports a history of 1 very early miscarriage), Raynaud's or a family history of autoimmune disease.     Testing done for her symptoms showed the positive SOHAM and borderline positive rheumatoid factor.  A rheumatoid factor was negative in 2019.  An ESR, CRP, anti CCP antibody, CK, Lyme antibody profile, anti neutrophilic cytoplasmic antibodies, Sjogren's antibodies and anti double-stranded DNA antibody were normal.  An MRI of her right knee and ultrasound of her right Achilles tendon in 2018 were normal.     In view of her complaints she was also evaluated by Neurology to rule out multiple sclerosis and currently there is no specific diagnosis from their perspective.  She did have an MRI of her brain done last year which showed a single focus of white matter change which has been stable since 2014.        7/13/2021:  Patient presents for a follow-up of a positive SOHAM.  I reviewed her workup done following the last office visit which showed an unremarkable SOHAM specificity, C3, C4, antiphospholipid antibody testing, urinalysis, urine protein creatinine ratio, vitamin-D, CK, anti CCP antibody and HLA B27 antigen.  X-rays of her hands and feet were unremarkable.       She reports there has been no change in her symptoms since the last office visit and she continues to describe the chronic fatigue, muscle aches/spasms as well as ongoing joint pains.  She is scheduled for another endocrinology opinion tomorrow to see if any of her symptoms may be related to the underlying hypothyroidism, but this is not felt to be the case so far.        2/16/2022:  Patient presents for follow-up of an undifferentiated  connective tissue disease.  She is currently on hydroxychloroquine 200 mg twice daily that was started in July 2021.       She reports in about September she started to notice a significant improvement in her well-being with being on the hydroxychloroquine.  There was an improvement in her fatigue and joint pains.  She had overall been doing well up until January but after she received the COVID-19 booster vaccination noticed a flare-up in her symptoms with more fatigue as well as significant joint pains which mostly affected her hands, wrists, knees and ankles.  She has been noticing intermittent swelling of her fingers as well as ankles/feet.  She has been taking Tylenol alternating with ibuprofen but is unsure if the medications are specifically helping her or if the side effects as a result of the vaccination are gradually improving on their own.  She was also evaluated by Gastroenterology and had an upper endoscopy done which showed chronic gastritis so she was advised to minimize NSAID use.  No recent steroids.     Except for the pain she has also had a few occurrences of redness on her face in a butterfly pattern as well as affecting her forehead.  She has noticed photosensitivity and states while she was at the beach in August she developed a skin rash in sun-exposed areas.  She has been more careful with applying sunscreen as well as covering up in the sun.  She denies fevers, weight loss, alopecia, mouth/nose ulcers, swollen glands or pleuritic chest pain.        6/22/2022:  Patient presents for follow-up of an undifferentiated connective tissue disease.  She is currently on hydroxychloroquine 200 mg twice daily that was started in July 2021.  I reviewed her blood work from February which showed a normal CBC, CMP, ESR, CRP, C3, C4, double-stranded DNA antibody, urinalysis and urine protein creatinine ratio.     She reports overall since the last office visit she has been fairly stable except for an episode  of pericarditis following the COVID-19 booster vaccination in January.  She was seen by Cardiology and prescribed colchicine to take for 3 months.  She reports the symptoms have mostly resolved and only on occasion will she notice some chest pain which is not long-lasting.  She reports with sun exposure she will start to notice more joint pains and fatigue.  For the most part she deals with some degree of joint pain on a daily basis and still notices swelling affecting her hands and ankles.  She will be starting occupational therapy as due to the joint pain in her hands she has started to feel weakness in her hand strength.  No fevers, unintentional weight loss, skin rashes or mouth/nose ulcers.     Although she still endorses symptoms she reports overall she has been feeling much better since starting the hydroxychloroquine.        10/27/2022:  Patient presents for a follow-up of an undifferentiated connective disease.  She is currently on hydroxychloroquine 200 mg twice daily that was started in July 2021 and oral methotrexate 15 mg once weekly that was started in June 2022.  I reviewed her recent labs which showed an unremarkable CBC, CMP, ESR, CRP, C3, C4, double-stranded DNA antibody and urine protein creatinine ratio.       She reports she has been doing well without any joint pains and the methotrexate really seems to have helped her.  With initially starting it she did notice numbness in her hand and legs for which she was seen in the emergency department but as it was unclear if this was related to the methotrexate I requested she resume it and the symptoms have not been consistent.  No skin rashes or mouth/nose ulcers.     She does report chronic symptoms of abdominal pain, nausea, vomiting and greasy stools for which she has been following with Gastroenterology.  A CT abdomen was unremarkable.  She is scheduled for a HIDA scan.        3/2/2023:  Patient presents for a follow-up of an undifferentiated  connective disease.  She is currently on hydroxychloroquine 200 mg twice daily that was started in July 2021 and oral methotrexate 20 mg once weekly that was started in June 2022.  I reviewed her recent labs which showed an unremarkable CBC, CMP, ESR, CRP, C3, C4, double-stranded DNA antibody and urine protein creatinine ratio.       She has been doing well since the last office visit and denies any complaints today.  No symptoms such as true fevers, unintentional weight loss, alopecia, skin rashes, mouth/nose ulcers, swollen glands, pleuritic chest pain or joint pain/swelling/stiffness.     She has been tolerating her medications well and is up-to-date with her annual eye exams but reports since December she has been dealing with recurrent infections.  She took a while to recover from COVID and recently was diagnosed with back to back sinus infections requiring antibiotic use.        9/11/2023:  Patient presents for a follow-up of an undifferentiated connective disease.  She is currently on hydroxychloroquine 200 mg twice daily on the weekdays and once daily on the weekend that was started in July 2021 and oral methotrexate 20 mg once weekly that was started in June 2022.  I reviewed her recent labs which showed an unremarkable CBC, CMP, ESR, CRP, C3, double-stranded DNA antibody and urine protein creatinine ratio.  A C4 complement was slightly decreased at 16.     Unfortunately since the last visit she has been feeling unwell with increased fatigue and widespread body pain that has been flaring up.  This is leading to a significant impact on her mental health.  She reports especially over the past month she has been in a constant flare.  Prior to this she did have an infection for which she was on antibiotics and held the injectable methotrexate for 1 week.  Initially she was trying to manage through the symptoms but did contact me on August 31 at which time I prescribed her prednisone 20 mg once daily for 7 days  which did help.  The steroids do raise her blood sugars up to the 400s but she has an insulin pump which will manage the elevated blood sugars.  After completing the steroids she is still not feeling well and is very upset and tearful at today's visit.  Apart from the joint pain she has also been noticing swelling that can primarily affect her hands, wrists, knees and ankles.  The steroids did help with the joint swelling.  She has noticed occurrence of a skin rash on her face as well.     She has been tolerating the hydroxychloroquine and methotrexate well overall but has been dealing with recurrent infections which was present even prior to the initiation of methotrexate.  She is following with immunology to determine if she may have an underlying immunodeficiency.  She is up-to-date with her annual eye exams.        12/18/2023:  Patient presents for a follow-up of an undifferentiated connective disease.  She is currently on hydroxychloroquine 200 mg twice daily on the weekdays and once daily on the weekend that was started in July 2021, injectable methotrexate 20 mg once weekly and Benlysta infusions 10 mg/kg every 4 weeks that was added after the last visit.  I reviewed her recent labs which showed a slightly decreased C4 complement of 13.  A CBC, CMP, ESR, CRP, C3, double-stranded DNA antibody, urine analysis and urine protein creatinine ratio were unremarkable.     She has completed the Benlysta infusions and received her first maintenance infusion recently.  She notes that it has helped with the facial rash but so far no significant impact on the fatigue and joint pains which she is still symptomatic with.  She avoids Tylenol and NSAIDs as this causes stomach upset.  She is currently on gabapentin 300 mg nightly per neurology for lower extremity neuropathy but states the medication does not help with the neuropathic complaints or with joint pains.     She has been tolerating the hydroxychloroquine,  methotrexate and Benlysta well without any concerning side effects.  She is up-to-date with her annual eye exams.        6/12/2024:  Patient presents for a follow-up of an undifferentiated connective disease.  She is currently on hydroxychloroquine 200 mg twice daily on the weekdays and once daily on the weekend that was started in July 2021, injectable methotrexate 20 mg once weekly and Benlysta infusions 10 mg/kg every 4 weeks.  She is due to update her full panel of lupus related labs.     She reports with consistent use of the Benlysta she has noticed a significant improvement in her pain levels and she is currently denying any concerning joint pains, swelling or stiffness.  No unexplained fevers, unintentional weight loss, focal alopecia, mouth/nose ulcers, swollen glands or pleuritic chest pain that she describes.  Recently she has noticed an itchy, raised skin rash to occur on her bilateral arms and abdomen which has been occurring on a mostly nightly basis.  She is wondering if this could be related to an allergic exposure.  She has been using Benadryl as needed which helps.  She has previously seen an allergist and had testing done which was unremarkable.     She mentions since her childhood she has had issues with recurrent upper respiratory tract infections.  This pattern seems to be continuing as she had a prolonged sinus infection from February till the end of March and was diagnosed with recurrent upper respiratory tract infections twice in May.  She has been able to manage this with outpatient antibiotics but states anytime she has an infection her blood sugars significantly elevate and she has been hospitalized for diabetic ketoacidosis.     She has been tolerating the hydroxychloroquine, methotrexate and Benlysta well without any concerning side effects, other than possible infection risk.  She is up-to-date with her annual eye exams.      10/7/2024:  Patient presents for a follow-up of an  undifferentiated connective disease/systemic lupus.  She is currently on hydroxychloroquine 200 mg twice daily on the weekdays and once daily on the weekend that was started in July 2021, injectable methotrexate 20 mg once weekly and Benlysta infusions 10 mg/kg every 4 weeks.  I reviewed her recent labs which shows an unremarkable CBC, CMP, ESR, CRP [on repeat check it normalized], C3, C4, double-stranded DNA antibody, urine analysis and urine protein creatinine ratio.    She has overall been stable, but since the last office visit for the past few months has been dealing with right hip/groin region pain.  She did have an MRI done which ruled out avascular necrosis and showed possible impingement.  She needs to schedule a follow-up with orthopedics.  She has been taking Tylenol and ibuprofen to help manage the pain.  Otherwise no consistent joint pains, swelling or stiffness.  With exposure to the sun she may develop a skin rash and has recently noticed dryness on her upper arms which was also sun exposed.  She does also have a mild degree of keratosis pilaris.  No fevers, unintentional weight loss, focal alopecia, mouth/nose ulcers, swollen glands or pleuritic chest pain.    She has been tolerating the hydroxychloroquine, methotrexate and Benlysta well without any concerning side effects.  She is up-to-date with her bi-annual eye exams.  She is seeing a retinal specialist at [facility] for a retinal tuft, but this is not felt to be secondary to the hydroxychloroquine.  No diabetic retinopathy.       2/12/2025:  Patient presents for a follow-up of an undifferentiated connective disease/systemic lupus.  She is currently on hydroxychloroquine 200 mg twice daily on the weekdays and once daily on the weekend that was started in July 2021, injectable methotrexate 20 mg once weekly and Benlysta infusions 10 mg/kg every 4 weeks.  I reviewed her recent labs which shows a minimally elevated ESR and CRP of 23 and 6.1,  respectively.  A CBC, CMP, C3, C4, double-stranded DNA antibody, urine analysis and urine protein creatinine ratio were unremarkable.    She mentions in December she had an infection and during this time she noticed her ankles to swell.  Since then her joint pains have not fully recovered and she is still noticing pain in her hands, wrists, ankles and feet.  No prominent swelling recently, but her ankles still swell from time to time.  She does experience morning stiffness in the same joints which starts to improve with activities.  She is taking Tylenol 500 mg twice daily and naproxen 220 mg once daily to help with her symptoms, and this was primarily started for the right hip pain.  She was advised to discuss with me if the right hip pain may be secondary to a rheumatic issue prior to orthopedics determining if she is a surgical candidate.  Overall her joint pains are manageable.  At times she has also noticed red, flat patches on her arms.  No fevers, unintentional weight loss, focal alopecia, mouth/nose ulcers, swollen glands or pleuritic chest pain.    She has been tolerating the hydroxychloroquine, methotrexate and Benlysta well without any concerning side effects.  She is up-to-date with her eye exams.       6/11/2025:  Patient presents for a follow-up of an undifferentiated connective disease/systemic lupus.  She is currently on hydroxychloroquine 200 mg twice daily on the weekdays and once daily on the weekend that was started in July 2021, injectable methotrexate 20 mg once weekly and Benlysta infusions 10 mg/kg every 4 weeks.  I reviewed her recent labs which shows a slightly reduced C4 complement of 17.  A CBC, CMP, ESR, CRP, C3 and urine protein creatinine ratio were unremarkable.  A double-stranded DNA antibody is still pending.    She reports overall she has been stable, but over the past 4 months she has been experiencing patches of redness on various areas of her body associated with intense itching  that occurs every night at approximately 8 PM.  She has been taking a 24-hour antihistamine product from the pharmacy which helps to some degree.  She will have to sleep off the symptoms and reports by the morning it tends to resolve.  She is fine for the day before symptoms recur at nighttime.  She has not had any change in her personal products, foods and there have been no new medications.  She has also had more frequent occurrences of a malar rash.  No fevers, unintentional weight loss, focal alopecia, recent mouth/nose ulcers, swollen glands, pleuritic chest pains or significant flareup in joint pain/swelling/stiffness.  At times she is noticing swelling in her ankles and she has also woken up on occasion with puffiness of her face.    She has been tolerating the hydroxychloroquine, methotrexate and Benlysta well without any concerning side effects.  She is up-to-date with her eye exams.       Review of Systems  Constitutional: Negative for weight change, fevers, chills, night sweats, fatigue.  ENT/Mouth: Negative for hearing changes, ear pain, nasal congestion, sinus pain, hoarseness, sore throat, rhinorrhea, swallowing difficulty.   Eyes: Negative for pain, redness, discharge, vision changes.   Cardiovascular: Negative for chest pain, SOB, palpitations.   Respiratory: Negative for cough, sputum, wheezing, dyspnea.   Gastrointestinal: Negative for nausea, vomiting, diarrhea, constipation, pain, heartburn.  Genitourinary: Negative for dysuria, urinary frequency, hematuria.   Musculoskeletal: As per HPI.  Skin: Negative for color changes.  Positive for skin rash.  Neuro: Negative for weakness, numbness, tingling, loss of consciousness.   Psych: Negative for anxiety, depression.   Heme/Lymph: Negative for easy bruising, bleeding, lymphadenopathy.      Past Medical History   Past Medical History:   Diagnosis Date    Abdominal pain     Anaphylaxis     Anxiety     Anxiety and depression     COVID-19 12/2020     Delayed emergence from anesthesia 03/23/2023    Severe episode of emergence delirium (confused, combative) after MAC anesthetic on 03/2023 for EGD. Received versed 2mg, >50mcg precedex, 5mg IV haldol, and 50mcg fentanyl. Waxing and waning episodes of agitation. Any future anesthetics should NOT be done at [facility].    Delirium, induced by drug     anesthesia    Depression     Diabetes mellitus (HCC)     Disease of thyroid gland     Hashimoto    DKA, type 1 (HCC) 05/11/2021    Eating disorder     history of anorexia/bulemia [id number]    Fatigue     Food intolerance     Fracture of fibula     R Salter I    Gilbert disease     Hashimoto's disease 02/21/2020    Head injury     Headache(784.0)     Migraine     Nasal congestion     PTSD (post-traumatic stress disorder)     Rectal bleed     Seizures (HCC)     Type 1 diabetes (HCC)      Past Surgical History:   Procedure Laterality Date    COLONOSCOPY      EGD  03/23/2023    KNEE SURGERY Right 07/06/2020    NASAL SEPTOPLASTY W/ TURBINOPLASTY      SINUS SURGERY      SKIN BIOPSY      TURBINOPLASTY N/A 03/22/2021    Procedure: TURBINOPLASTY;  Surgeon: Jn Hidalgo MD;  Location: [facility];  Service: ENT    UPPER GASTROINTESTINAL ENDOSCOPY      WISDOM TOOTH EXTRACTION      WRIST SURGERY      left; Excision of ganglion     Family History   Problem Relation Name Age of Onset    Hypertension Mother Nancy     Migraines Mother Nancy         Headache    Diabetes type II Mother Nancy     Varicose Veins Mother Nancy     Hyperlipidemia Mother Nancy     Diabetes Mother Nancy     Arthritis Mother Nancy     Depression Mother Nancy     Hearing loss Mother Nancy     Anxiety disorder Mother Nancy     Eczema Father Michael     Cholelithiasis Father Michael     Hypertension Father Michael     Sarcoidosis Father Michael         Liver    Hyperlipidemia Father Michael     Diabetes Father Michael     Coronary artery disease Father Michael     Nephrolithiasis Father Michael      "Cirrhosis Father Michael     Alcohol abuse Father Michael     Thyroid disease Sister      Hashimoto's thyroiditis Sister      Alcohol abuse Brother      Cancer Family      Diabetes Family      Hypertension Family      Thyroid disease Sister Ambika      Current Outpatient Medications on File Prior to Visit   Medication Sig Dispense Refill    acetaminophen (TYLENOL) 325 mg tablet Take 3 tablets (975 mg total) by mouth every 8 (eight) hours as needed for mild pain or headaches      albuterol (ProAir HFA) 90 mcg/act inhaler Inhale 2 puffs every 6 (six) hours as needed for wheezing or shortness of breath 8.5 g 0    BD Insulin Syringe U/F 31G X 5/16\" 0.5 ML MISC Use as directly with weekly methotrexate injection. 100 each 0    Belimumab (BENLYSTA IV) Inject into a catheter in a vein every month to absorb continually Switching to monthly on 12/5      benzonatate (TESSALON) 200 MG capsule Take 1 capsule (200 mg total) by mouth 3 (three) times a day as needed for cough 20 capsule 0    Botox 200 units SOLR       clindamycin (CLEOCIN T) 1 % lotion Apply 1 application. topically 2 (two) times a day      Cyanocobalamin 1000 MCG SUBL Place 1 tablet (1,000 mcg total) under the tongue daily 90 tablet 0    famotidine (PEPCID) 40 MG tablet Take 1 tablet (40 mg total) by mouth daily at bedtime 90 tablet 3    fluticasone (FLONASE) 50 mcg/act nasal spray SPRAY 1 SPRAY INTO EACH NOSTRIL EVERY DAY 48 mL 2    folic acid (FOLVITE) 1 mg tablet TAKE 1 TABLET BY MOUTH EVERY DAY 90 tablet 3    gabapentin (Neurontin) 600 MG tablet Take 1 tablet (600 mg total) by mouth daily 90 tablet 1    Glucagon (Gvoke HypoPen 2-Pack) 1 MG/0.2ML SOAJ 1 mg PRN for severe hypoglycemia 0.4 mL 0    Glucagon HCl (Glucagon Emergency) 1 MG/ML SOLR       hydroxychloroquine (PLAQUENIL) 200 mg tablet Take 1 tab twice daily on M-F and 1 tab once daily on the weekend. 180 tablet 3    insulin degludec (Tresiba FlexTouch) 100 units/mL injection pen INJECT 32 UNITS DAILY " 15 mL 2    Insulin Infusion Pump (T:slim X2 Ins Pump/Control-IQ) ANURAG Use      ketorolac (TORADOL) 10 mg tablet Take 1 tablet (10 mg total) by mouth every 6 (six) hours as needed (migraine) Max 2-3 per week. 10 tablet 0    levonorgestrel-ethinyl estradiol (Altavera) 0.15-30 MG-MCG per tablet TAKE 1 TABLET BY MOUTH EVERY DAY 84 tablet 1    levothyroxine 25 mcg tablet Take 1 tablet (25 mcg total) by mouth daily 90 tablet 3    LORazepam (ATIVAN PO) Take by mouth daily as needed      metFORMIN (GLUCOPHAGE-XR) 500 mg 24 hr tablet Take 2 tablets (1,000 mg total) by mouth 2 (two) times a day with meals 360 tablet 2    methotrexate 50 MG/2ML injection INJECT 0.8 ML UNDER THE SKIN ONCE EVERY 7 DAYS. DISCARD UNUSED REMAINDER 30 DAYS AFTER INITIAL USE 10 mL 3    NovoLOG 100 UNIT/ML injection Up to 100 units per day with insulin pump 90 mL 3    ondansetron (ZOFRAN-ODT) 4 mg disintegrating tablet Take 1 tablet (4 mg total) by mouth every 6 (six) hours as needed for nausea or vomiting for up to 12 doses 12 tablet 0    pantoprazole (PROTONIX) 40 mg tablet TAKE 1 TABLET BY MOUTH EVERY DAY 90 tablet 1    rizatriptan (MAXALT-MLT) 10 mg disintegrating tablet 1 tab at migraine onset, repeat after 2 hours if needed; Max 2 per day and 3 per week. 9 tablet 2    Tresiba FlexTouch 100 units/mL injection pen Inject 32 Units under the skin daily 15 mL 2    [DISCONTINUED] Naproxen Sodium (ALEVE PO) Take by mouth daily as needed      EPINEPHrine (EPIPEN) 0.3 mg/0.3 mL SOAJ Inject 0.3 mL (0.3 mg total) into a muscle once for 1 dose 0.6 mL 0    Insulin Pen Needle (BD PEN NEEDLE NASIM U/F) 32G X 4 MM MISC by Does not apply route 4 (four) times a day for 180 days 400 each 3    promethazine (PHENERGAN) 25 mg tablet Take 1 tablet (25 mg total) by mouth every 6 (six) hours as needed for nausea or vomiting for up to 3 days (Patient not taking: Reported on 3/14/2025) 12 tablet 0    sucralfate (CARAFATE) 1 g tablet Take 1 tablet (1 g total) by mouth 4  "(four) times a day (Patient not taking: Reported on 6/11/2025) 40 tablet 0     Current Facility-Administered Medications on File Prior to Visit   Medication Dose Route Frequency Provider Last Rate Last Admin    Botulinum Toxin Type A SOLR 200 Units  200 Units Injection Once         Botulinum Toxin Type A SOLR 200 Units  200 Units Injection See Admin Instructions          Allergies   Allergen Reactions    Shellfish-Derived Products - Food Allergy Anaphylaxis     Octopus, sea snails, claims, etc (ask patient)    Insulin Glargine Other (See Comments)     LANTUS BRAND -Burning and redness under skin       Current Outpatient Medications on File Prior to Visit   Medication Sig Dispense Refill    acetaminophen (TYLENOL) 325 mg tablet Take 3 tablets (975 mg total) by mouth every 8 (eight) hours as needed for mild pain or headaches      albuterol (ProAir HFA) 90 mcg/act inhaler Inhale 2 puffs every 6 (six) hours as needed for wheezing or shortness of breath 8.5 g 0    BD Insulin Syringe U/F 31G X 5/16\" 0.5 ML MISC Use as directly with weekly methotrexate injection. 100 each 0    Belimumab (BENLYSTA IV) Inject into a catheter in a vein every month to absorb continually Switching to monthly on 12/5      benzonatate (TESSALON) 200 MG capsule Take 1 capsule (200 mg total) by mouth 3 (three) times a day as needed for cough 20 capsule 0    Botox 200 units SOLR       clindamycin (CLEOCIN T) 1 % lotion Apply 1 application. topically 2 (two) times a day      Cyanocobalamin 1000 MCG SUBL Place 1 tablet (1,000 mcg total) under the tongue daily 90 tablet 0    famotidine (PEPCID) 40 MG tablet Take 1 tablet (40 mg total) by mouth daily at bedtime 90 tablet 3    fluticasone (FLONASE) 50 mcg/act nasal spray SPRAY 1 SPRAY INTO EACH NOSTRIL EVERY DAY 48 mL 2    folic acid (FOLVITE) 1 mg tablet TAKE 1 TABLET BY MOUTH EVERY DAY 90 tablet 3    gabapentin (Neurontin) 600 MG tablet Take 1 tablet (600 mg total) by mouth daily 90 tablet 1    " Glucagon (Gvoke HypoPen 2-Pack) 1 MG/0.2ML SOAJ 1 mg PRN for severe hypoglycemia 0.4 mL 0    Glucagon HCl (Glucagon Emergency) 1 MG/ML SOLR       hydroxychloroquine (PLAQUENIL) 200 mg tablet Take 1 tab twice daily on M-F and 1 tab once daily on the weekend. 180 tablet 3    insulin degludec (Tresiba FlexTouch) 100 units/mL injection pen INJECT 32 UNITS DAILY 15 mL 2    Insulin Infusion Pump (T:slim X2 Ins Pump/Control-IQ) ANURAG Use      ketorolac (TORADOL) 10 mg tablet Take 1 tablet (10 mg total) by mouth every 6 (six) hours as needed (migraine) Max 2-3 per week. 10 tablet 0    levonorgestrel-ethinyl estradiol (Altavera) 0.15-30 MG-MCG per tablet TAKE 1 TABLET BY MOUTH EVERY DAY 84 tablet 1    levothyroxine 25 mcg tablet Take 1 tablet (25 mcg total) by mouth daily 90 tablet 3    LORazepam (ATIVAN PO) Take by mouth daily as needed      metFORMIN (GLUCOPHAGE-XR) 500 mg 24 hr tablet Take 2 tablets (1,000 mg total) by mouth 2 (two) times a day with meals 360 tablet 2    methotrexate 50 MG/2ML injection INJECT 0.8 ML UNDER THE SKIN ONCE EVERY 7 DAYS. DISCARD UNUSED REMAINDER 30 DAYS AFTER INITIAL USE 10 mL 3    NovoLOG 100 UNIT/ML injection Up to 100 units per day with insulin pump 90 mL 3    ondansetron (ZOFRAN-ODT) 4 mg disintegrating tablet Take 1 tablet (4 mg total) by mouth every 6 (six) hours as needed for nausea or vomiting for up to 12 doses 12 tablet 0    pantoprazole (PROTONIX) 40 mg tablet TAKE 1 TABLET BY MOUTH EVERY DAY 90 tablet 1    rizatriptan (MAXALT-MLT) 10 mg disintegrating tablet 1 tab at migraine onset, repeat after 2 hours if needed; Max 2 per day and 3 per week. 9 tablet 2    Tresiba FlexTouch 100 units/mL injection pen Inject 32 Units under the skin daily 15 mL 2    [DISCONTINUED] Naproxen Sodium (ALEVE PO) Take by mouth daily as needed      EPINEPHrine (EPIPEN) 0.3 mg/0.3 mL SOAJ Inject 0.3 mL (0.3 mg total) into a muscle once for 1 dose 0.6 mL 0    Insulin Pen Needle (BD PEN NEEDLE NASIM U/F) 32G X  "4 MM MISC by Does not apply route 4 (four) times a day for 180 days 400 each 3    promethazine (PHENERGAN) 25 mg tablet Take 1 tablet (25 mg total) by mouth every 6 (six) hours as needed for nausea or vomiting for up to 3 days (Patient not taking: Reported on 3/14/2025) 12 tablet 0    sucralfate (CARAFATE) 1 g tablet Take 1 tablet (1 g total) by mouth 4 (four) times a day (Patient not taking: Reported on 6/11/2025) 40 tablet 0     Current Facility-Administered Medications on File Prior to Visit   Medication Dose Route Frequency Provider Last Rate Last Admin    Botulinum Toxin Type A SOLR 200 Units  200 Units Injection Once         Botulinum Toxin Type A SOLR 200 Units  200 Units Injection See Admin Instructions           Social History     Tobacco Use    Smoking status: Never     Passive exposure: Never    Smokeless tobacco: Never    Tobacco comments:     Tobacco smoke exposure (Father smokes cigars)   Vaping Use    Vaping status: Never Used   Substance and Sexual Activity    Alcohol use: Not Currently     Comment: rarely    Drug use: No    Sexual activity: Yes     Partners: Male     Birth control/protection: OCP         Objective     /80   Pulse 103   Ht 5' 1.5\" (1.562 m)   Wt 65.3 kg (144 lb)   SpO2 99%   BMI 26.77 kg/m²     Physical Exam  General: Well appearing, well nourished, in no distress. Oriented x 3, normal mood and affect.  Ambulating without difficulty.  Skin: Good turgor, no rash at this time, unusual bruising or prominent lesions.  She shows me pictures on her phone with mild erythema in a malar distribution and macular erythematous patches on her extremities.   Hair: Normal texture and distribution.  Nails: Normal color, no deformities.  HEENT:  Head: Normocephalic, atraumatic.  Eyes: Conjunctiva clear, sclera non-icteric, EOM intact.  Nose: No external lesions.  Neck: Supple.  Extremities: No amputations or deformities, cyanosis, edema.  Musculoskeletal:   There is no soft tissue swelling " or tenderness noted on her exam today.  Neurologic: Alert and oriented. No focal neurological deficits appreciated.   Psychiatric: Normal mood and affect.

## 2025-06-12 LAB — DSDNA IGG SERPL IA-ACNC: <0.9 IU/ML (ref ?–15)

## 2025-06-13 ENCOUNTER — PROCEDURE VISIT (OUTPATIENT)
Dept: PODIATRY | Facility: CLINIC | Age: 30
End: 2025-06-13
Payer: COMMERCIAL

## 2025-06-13 VITALS — HEIGHT: 62 IN | BODY MASS INDEX: 26.5 KG/M2 | WEIGHT: 144 LBS

## 2025-06-13 DIAGNOSIS — B07.0 PLANTAR WART: ICD-10-CM

## 2025-06-13 DIAGNOSIS — E10.8 TYPE I DIABETES MELLITUS WITH MANIFESTATIONS (HCC): Primary | ICD-10-CM

## 2025-06-13 PROCEDURE — 17110 DESTRUCTION B9 LES UP TO 14: CPT | Performed by: PODIATRIST

## 2025-06-13 PROCEDURE — 99213 OFFICE O/P EST LOW 20 MIN: CPT | Performed by: PODIATRIST
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
## 2025-06-15 ENCOUNTER — HOSPITAL ENCOUNTER (EMERGENCY)
Facility: HOSPITAL | Age: 30
Discharge: HOME | End: 2025-06-15
Attending: EMERGENCY MEDICINE | Admitting: EMERGENCY MEDICINE
Payer: COMMERCIAL

## 2025-06-15 ENCOUNTER — APPOINTMENT (EMERGENCY)
Dept: CT IMAGING | Facility: HOSPITAL | Age: 30
End: 2025-06-15
Payer: COMMERCIAL

## 2025-06-15 VITALS
SYSTOLIC BLOOD PRESSURE: 129 MMHG | TEMPERATURE: 97.7 F | DIASTOLIC BLOOD PRESSURE: 85 MMHG | RESPIRATION RATE: 18 BRPM | HEART RATE: 102 BPM | OXYGEN SATURATION: 100 %

## 2025-06-15 DIAGNOSIS — S09.90XA CLOSED HEAD INJURY, INITIAL ENCOUNTER: Primary | ICD-10-CM

## 2025-06-15 LAB — GLUCOSE SERPL-MCNC: 291 MG/DL (ref 65–140)

## 2025-06-15 PROCEDURE — 82948 REAGENT STRIP/BLOOD GLUCOSE: CPT

## 2025-06-15 PROCEDURE — 99284 EMERGENCY DEPT VISIT MOD MDM: CPT

## 2025-06-15 PROCEDURE — 72125 CT NECK SPINE W/O DYE: CPT

## 2025-06-15 PROCEDURE — 70450 CT HEAD/BRAIN W/O DYE: CPT

## 2025-06-15 PROCEDURE — 99284 EMERGENCY DEPT VISIT MOD MDM: CPT | Performed by: EMERGENCY MEDICINE

## 2025-06-15 RX ORDER — ACETAMINOPHEN 325 MG/1
975 TABLET ORAL ONCE
Status: COMPLETED | OUTPATIENT
Start: 2025-06-15 | End: 2025-06-15

## 2025-06-15 RX ADMIN — ACETAMINOPHEN 975 MG: 325 TABLET ORAL at 17:35
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
## 2025-06-15 NOTE — DISCHARGE INSTRUCTIONS
You were seen in the Emergency Department for: Head injury    Your workup today showed: Normal head and neck imaging    Your next steps should include: Follow-up with your primary care provider    Reasons to RETURN IMMEDIATELY to the Emergency Department: You have developed difficulty with balance, began to have nausea or vomiting or develop any new or worsening symptoms that concern you

## 2025-06-16 ENCOUNTER — PROCEDURE VISIT (OUTPATIENT)
Dept: NEUROLOGY | Facility: CLINIC | Age: 30
End: 2025-06-16
Payer: COMMERCIAL

## 2025-06-16 VITALS — DIASTOLIC BLOOD PRESSURE: 72 MMHG | TEMPERATURE: 98.1 F | SYSTOLIC BLOOD PRESSURE: 112 MMHG

## 2025-06-16 DIAGNOSIS — G43.709 CHRONIC MIGRAINE WITHOUT AURA WITHOUT STATUS MIGRAINOSUS, NOT INTRACTABLE: Primary | ICD-10-CM

## 2025-06-16 PROCEDURE — 64615 CHEMODENERV MUSC MIGRAINE: CPT | Performed by: PHYSICIAN ASSISTANT

## 2025-06-16 NOTE — PROGRESS NOTES
Universal Protocol   Consent: Verbal consent obtained. Written consent obtained  Risks and benefits: risks, benefits and alternatives were discussed  Consent given by: patient  Patient understanding: patient states understanding of the procedure being performed  Patient consent: the patient's understanding of the procedure matches consent given  Procedure consent: procedure consent matches procedure scheduled      Chemodenervation     Date/Time  6/16/2025 9:30 AM     Performed by  Paola Phillips PA-C   Authorized by  Paola Phillips PA-C     Pre-procedure details      Prepped With: Alcohol     Procedure details      Position:  Upright   Botox      Botox Type:  Type A    Brand:  Botox    mL's of Botulinum Toxin:  200    Final Concentration per CC:  100 units    Needle Gauge:  30 G 2.5 inch   Procedures      Botox Procedures: chronic headache      Indications: migraines     Injection Location      Head / Face:  L superior trapezius, R superior trapezius, L superior cervical paraspinal, R superior cervical paraspinal, L , R , procerus, L temporalis, R temporalis, R frontalis, L frontalis, R medial occipitalis and L medial occipitalis    L  injection amount:  5 unit(s)    R  injection amount:  5 unit(s)    L lateral frontalis:  5 unit(s)    R lateral frontalis:  5 unit(s)    L medial frontalis:  5 unit(s)    R medial frontalis:  5 unit(s)    L temporalis injection amount:  20 unit(s)    R temporalis injection amount:  20 unit(s)    Procerus injection amount:  5 unit(s)    L medial occipitalis injection amount:  15 unit(s)    R medial occipitalis injection amount:  15 unit(s)    L superior cervical paraspinal injection amount:  10 unit(s)    R superior cervical paraspinal injection amount:  10 unit(s)    L superior trapezius injection amount:  15 unit(s)    R superior trapezius injection amount:  15 unit(s)   Total Units      Total units used:  200    Total units discarded:  0    Post-procedure details      Chemodenervation:  Chronic migraine    Facial Nerve Location::  Bilateral facial nerve    Patient tolerance of procedure:  Tolerated well, no immediate complications   Comments       Extra u medically necessary- 45 u R temporoparietal region.  Supine for frontal region.       Blood pressure 112/72, temperature 98.1 °F (36.7 °C), temperature source Temporal, not currently breastfeeding.    First injection, tolerated well.
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
## 2025-06-20 ENCOUNTER — OFFICE VISIT (OUTPATIENT)
Dept: ENDOCRINOLOGY | Facility: CLINIC | Age: 30
End: 2025-06-20
Payer: COMMERCIAL

## 2025-06-20 VITALS
OXYGEN SATURATION: 99 % | WEIGHT: 143.9 LBS | BODY MASS INDEX: 27.17 KG/M2 | HEART RATE: 90 BPM | HEIGHT: 61 IN | DIASTOLIC BLOOD PRESSURE: 78 MMHG | SYSTOLIC BLOOD PRESSURE: 120 MMHG

## 2025-06-20 DIAGNOSIS — E10.65 TYPE 1 DIABETES MELLITUS WITH HYPERGLYCEMIA (HCC): Primary | ICD-10-CM

## 2025-06-20 DIAGNOSIS — E03.9 HYPOTHYROIDISM, UNSPECIFIED TYPE: ICD-10-CM

## 2025-06-20 PROCEDURE — 99214 OFFICE O/P EST MOD 30 MIN: CPT

## 2025-06-20 PROCEDURE — 95251 CONT GLUC MNTR ANALYSIS I&R: CPT

## 2025-06-20 NOTE — PROGRESS NOTES
Name: Jenny Rodrigues      : 1995      MRN: [id number]  Encounter Provider: MARGARET Pagan  Encounter Date: 2025   Encounter department: [facility] [place]    Chief Complaint   Patient presents with    Diabetes Type 1     Assessment & Plan  1. Type 1 Diabetes Mellitus: Chronic. A1c 7.7%, goal to reduce below 6.5 without hypoglycemia for potential future pregnancy. She will be meeting with [facility] soon (appointment ). Tandem report indicates satisfactory control with instances of elevated blood glucose.  - Adjust carb ratio from 4 g to 3 g  - Provide G7 sensor sample  - Consider purchasing patches from [employer] for Dexcom sensor  - Explore Mobi pump trial for 90 days if unsure. Provided info for Sha Esteban (tandem rep)  - Maintain A1c below 6.5 prior to pregnancy and around 6 during pregnancy  - Discuss risks of uncontrolled blood glucose during pregnancy    2. Hypothyroidism.  - Continue levothyroxine regimen  - Monitor thyroid function regularly and report changes    3. Hyperlipidemia  Continue to monitor diet and exercise lifestyle modifications    Follow-up  - Follow-up in 3 months    History of Present Illness  Jenny Rodrigues is a 30 y.o. female with type 1 diabetes seen in follow up. Reports complications of neuropathy. Denies recent severe hypoglycemic or severe hyperglycemic episodes. Denies any issues with her current regimen. Last A1C was 6.9. Denies recent illness, hospitalization or steroid use.      History of early heart attack in father before age 40- suspected (she is unsure)  +family history of hyperlipidemia  Is not interested in G7 but having more issues with G6, therefore is considering switching.    Exploring options for having children. Has discussed with rheumatology. Has upcoming appointment with [facility].   Not interested in beta bionics. Is unable to give up control. Dislikes inability to bolus. Feels comfortable with Tandem  (started using 2019).     CGM Interpretation  Date Range: June 6 to June 19, 2025  Device used: Dexcom  Option - Home use  Option - Indication: Type 1 Diabetes  Analysis of data:   Average Glucose: 182  GMI: 7.7%  Coefficient of Variation: 49.1%  SD: 89 mg/dL  Time in Target Range: 54%   Time Above Range: 24% high, 19% very high  Time Below Range: 3% low, <1% very low  Interpretation of data: hyperglycemia after meals.  More than 72 hours of data was reviewed. Report to be scanned to chart.      Current regimen:   Metformin 1,000 mg BID  Patient is on a Tandem pump prescribed by endocrinology. Discussed with patient in case of malfunctioning of the pump to use basal and bolus therapy as backup which is prescribed to the patient. Also notified patient to call clinic if any issues. Pump settings uploaded into media    - They manage their diabetes with a Tandem pump, find it effective, and maintain a consistent diet with 30-45 grams of carbohydrates per meal.  - They manually bolus as needed and change their pump every 2-3 days.  - They have used this pump for 6 years and prefer it due to its control features.  - They are interested in trying the G7 sensor again but have experienced connectivity issues between their pump and sensor.  - They found Dexcom patches ineffective and noticed unmanageable blood sugar levels when their Dexcom sensor expires.  - Their blood sugar levels are better controlled in [place], where they consume more vegetables and walk more.    Pregnancy Consideration  - They are considering pregnancy and have scheduled an appointment with [facility] on 07/07/2025.  - They are aware of the risks associated with pregnancy given their lupus and diabetes.  - They need to discontinue some medications for 3-6 months prior to conception and maintain a flare-up-free period of 6 months.  - Their insulin regimen may need adjustment during pregnancy.  - They have used the Tandem pump since 06/2019 and experienced  "allergic reactions to the OmniPod Dash adhesive.  - They recall an incident where their pump detached while sleeping, resulting in a blood sugar level in the 500s upon waking.    Lupus  - They have lupus and are currently on medication.  - Their quality of life was significantly impaired prior to their current medication regimen.  - They are concerned about the impact of discontinuing their medication.      Last Eye Exam: 05/16/2024  Last Foot Exam: 06/13/2025         Hypothyroidism: levothyroxine 25 mcg daily    Health Maintenance   Topic Date Due    Diabetic Eye Exam  05/16/2026    Diabetic Foot Exam  06/13/2026       Neurology, rheum- SLE        Review of Systems   Constitutional:  Negative for chills and fever.   HENT:  Negative for ear pain and sore throat.    Eyes:  Negative for pain and visual disturbance.   Respiratory:  Negative for cough and shortness of breath.    Cardiovascular:  Negative for chest pain and palpitations.   Gastrointestinal:  Negative for abdominal pain and vomiting.   Genitourinary:  Negative for dysuria and hematuria.   Musculoskeletal:  Negative for arthralgias and back pain.   Skin:  Negative for color change and rash.   Neurological:  Negative for seizures and syncope.   All other systems reviewed and are negative.   as per HPI       Medical History Reviewed by provider this encounter:     .    Objective   /78   Pulse 90   Ht 5' 1\" (1.549 m)   Wt 65.3 kg (143 lb 14.4 oz)   SpO2 99%   BMI 27.19 kg/m²      Body mass index is 27.19 kg/m².  Wt Readings from Last 3 Encounters:   06/20/25 65.3 kg (143 lb 14.4 oz)   06/13/25 65.3 kg (144 lb)   06/11/25 65.3 kg (144 lb)     Physical Exam  - General: Well and in no acute distress  - Head and Neck: Normal thyroid function  Physical Exam  Vitals reviewed.   HENT:      Head: Normocephalic and atraumatic.     Cardiovascular:      Rate and Rhythm: Normal rate.   Pulmonary:      Effort: Pulmonary effort is normal.     Neurological:      " Mental Status: She is alert.       Physical Exam      Results  - Labs:    - A1c: 7.7%    - LDL: 113 mg/dL    - CGM:    - TBR: 4%    - Mean glucose: 7.7%    - Post-meal spikes corrected well by the pump  Labs:   Lab Results   Component Value Date    HGBA1C 6.9 (H) 06/09/2025    HGBA1C 7.9 (H) 03/13/2025    HGBA1C 9.0 (H) 12/04/2024     Lab Results   Component Value Date    CREATININE 0.74 06/09/2025    CREATININE 0.69 04/06/2025    CREATININE 0.79 03/13/2025    BUN 9 06/09/2025    K 4.3 06/09/2025     06/09/2025    CO2 27 06/09/2025     GFR, Calculated   Date Value Ref Range Status   12/29/2020 69 >60 mL/min/1.73m2 Final     Comment:     mL/min per 1.73 square meters                                            Normal Function or Mild Renal    Disease (if clinically at risk):  >or=60  Moderately Decreased:                30-59  Severely Decreased:                  15-29  Renal Failure:                         <15                                            -American GFR: multiply reported GFR by 1.16    Please note that the eGFR is based on the CKD-EPI calculation, and is not intended to be used for drug dosing.     eGFRcr   Date Value Ref Range Status   07/09/2023 125 >59 Final     eGFR   Date Value Ref Range Status   06/09/2025 110 ml/min/1.73sq m Final     Lab Results   Component Value Date    HDL 74 03/13/2025    TRIG 80 03/13/2025     Lab Results   Component Value Date    ALT 11 06/09/2025    AST 17 06/09/2025    GGT 10 06/26/2019    ALKPHOS 74 06/09/2025     Lab Results   Component Value Date    AHQ2MCGFNGQJ 1.383 03/13/2025    LOC0WYHZRJKC 0.984 10/02/2024    PNC9HZYCJXTA 4.241 05/03/2024       There are no Patient Instructions on file for this visit.    Discussed with the patient and all questioned fully answered. She will call me if any problems arise.

## 2025-06-28 DIAGNOSIS — Z30.41 ENCOUNTER FOR SURVEILLANCE OF CONTRACEPTIVE PILLS: ICD-10-CM

## 2025-06-30 RX ORDER — LEVONORGESTREL AND ETHINYL ESTRADIOL 0.15-0.03
1 KIT ORAL DAILY
Qty: 84 TABLET | Refills: 1 | Status: SHIPPED | OUTPATIENT
Start: 2025-06-30

## 2025-07-02 ENCOUNTER — OFFICE VISIT (OUTPATIENT)
Dept: FAMILY MEDICINE CLINIC | Facility: CLINIC | Age: 30
End: 2025-07-02
Payer: COMMERCIAL

## 2025-07-02 VITALS
SYSTOLIC BLOOD PRESSURE: 110 MMHG | HEART RATE: 100 BPM | HEIGHT: 61 IN | BODY MASS INDEX: 26.81 KG/M2 | TEMPERATURE: 97.6 F | DIASTOLIC BLOOD PRESSURE: 80 MMHG | OXYGEN SATURATION: 100 % | WEIGHT: 142 LBS | RESPIRATION RATE: 17 BRPM

## 2025-07-02 DIAGNOSIS — F41.0 GENERALIZED ANXIETY DISORDER WITH PANIC ATTACKS: Primary | ICD-10-CM

## 2025-07-02 DIAGNOSIS — F31.62 BIPOLAR DISORDER, CURRENT EPISODE MIXED, MODERATE (HCC): ICD-10-CM

## 2025-07-02 DIAGNOSIS — F43.12 CHRONIC POST-TRAUMATIC STRESS DISORDER (PTSD): ICD-10-CM

## 2025-07-02 DIAGNOSIS — F41.1 GENERALIZED ANXIETY DISORDER WITH PANIC ATTACKS: Primary | ICD-10-CM

## 2025-07-02 DIAGNOSIS — R56.9 SEIZURES (HCC): ICD-10-CM

## 2025-07-02 PROCEDURE — 99213 OFFICE O/P EST LOW 20 MIN: CPT | Performed by: FAMILY MEDICINE

## 2025-07-02 RX ORDER — LORAZEPAM 1 MG/1
1 TABLET ORAL EVERY 8 HOURS PRN
Qty: 60 TABLET | Refills: 0 | Status: SHIPPED | OUTPATIENT
Start: 2025-07-02

## 2025-07-02 NOTE — LETTER
July 2, 2025     Patient: Jenny Rodrigues  YOB: 1995  Date of Visit: 7/2/2025      To Whom it May Concern:    Jenny Rodrigues is under my professional care. Jenny was seen in my office on 7/2/2025. Jenny may return to work on 7/2/2025.    If you have any questions or concerns, please don't hesitate to call.         Sincerely,          Osvaldo Betancur, DO        CC: No Recipients
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
## 2025-07-02 NOTE — ASSESSMENT & PLAN NOTE
Orders:  •  LORazepam (ATIVAN) 1 mg tablet; Take 1 tablet (1 mg total) by mouth every 8 (eight) hours as needed for anxiety
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
## 2025-07-02 NOTE — ASSESSMENT & PLAN NOTE
Stable.  Manages with consistent therapy.  Has multiple medication intolerances in the past.  No SI or HI.

## 2025-07-07 ENCOUNTER — TELEPHONE (OUTPATIENT)
Age: 30
End: 2025-07-07

## 2025-07-07 ENCOUNTER — TELEMEDICINE (OUTPATIENT)
Facility: HOSPITAL | Age: 30
End: 2025-07-07
Payer: COMMERCIAL

## 2025-07-07 DIAGNOSIS — Z71.89: ICD-10-CM

## 2025-07-07 DIAGNOSIS — Z96.41 INSULIN PUMP STATUS: ICD-10-CM

## 2025-07-07 DIAGNOSIS — E03.9 HYPOTHYROIDISM, UNSPECIFIED TYPE: ICD-10-CM

## 2025-07-07 DIAGNOSIS — E10.65 UNCONTROLLED TYPE 1 DIABETES MELLITUS WITH HYPERGLYCEMIA, WITH LONG-TERM CURRENT USE OF INSULIN (HCC): Primary | ICD-10-CM

## 2025-07-07 PROBLEM — E10.9: Status: ACTIVE | Noted: 2025-07-07

## 2025-07-07 PROCEDURE — 99417 PROLNG OP E/M EACH 15 MIN: CPT | Performed by: NURSE PRACTITIONER

## 2025-07-07 PROCEDURE — 99215 OFFICE O/P EST HI 40 MIN: CPT | Performed by: NURSE PRACTITIONER

## 2025-07-07 NOTE — ASSESSMENT & PLAN NOTE
-A1c 6.9% above goal. CMP within normal except for glucose. UPCR 0.10 normal. TSH within normal.   -A1c goal less than 6% with minimal hypoglycemia during pregnancy.   -Novolog via Tandem T slim insulin pump with Dexcom G6 CGM in place.   -During pregnancy, glucose goals fasting 60-90 mg/dL; before meals/overnight  mg/dL; 1 hour post start of meals 140 mg/dL or less and 2 hours post start of meals 120 mg/dL or less.   -Start prenatal vitamin and continue folic acid.  -During pregnancy, recommendation is to eat every 2 to 3.5 hours during the day with a combination of carbohydrates and protein.  -3 meals and 3 snacks a day, early pregnancy minimum total daily carbohydrates 135 grams paired with half the grams in protein.   -2nd/3rd trimester- minimum total daily carbohydrates 175 grams paired with half the grams in protein.   -Avoid fasting for greater than 8-10 hours overnight.   -Consult with Rheumatology and Neurology regarding plan of care prior to pregnancy.   -Preconception counseling with [facility] physician to discuss plan of care.   -Genetic counseling to review family medical history.   -Once a pregnancy occurs, schedule an appointment with diabetes team.   -Every week to every other week review of insulin pump download with recommendations.   -Every 4 to 6 week appointments with diabetes team.  -Starting at 12 weeks gestation, baby aspirin 162 mg daily as a pre-eclampsia preventative measure and to continue until 36 weeks gestation.    -Serial fetal growth ultrasounds will be recommended-NT; early anatomy ultrasound, 20 weeks detailed fetal growth then every 4 to 6 weeks as recommended.  -22-24 weeks fetal echo.  -At 32 weeks gestation, NST twice a week and ISELA weekly.   -OBGYN will primarily manage pregnancy.   -Endocrinology will manage hypothyroidism during pregnancy.   -MFM will manage diabetes during pregnancy.   Lab Results   Component Value Date    HGBA1C 6.9 (H) 06/09/2025       Orders:     Ambulatory Referral to Maternal Fetal Medicine; Future

## 2025-07-07 NOTE — PATIENT INSTRUCTIONS
-A1c 6.9% above goal. CMP within normal except for glucose. UPCR 0.10 normal. TSH within normal.   -A1c goal less than 6% with minimal hypoglycemia during pregnancy.   -Novolog via Tandem T slim insulin pump with Dexcom G6 CGM in place.   -During pregnancy, glucose goals fasting 60-90 mg/dL; before meals/overnight  mg/dL; 1 hour post start of meals 140 mg/dL or less and 2 hours post start of meals 120 mg/dL or less.   -Start prenatal vitamin and continue folic acid.  -During pregnancy, recommendation is to eat every 2 to 3.5 hours during the day with a combination of carbohydrates and protein.  -3 meals and 3 snacks a day, early pregnancy minimum total daily carbohydrates 135 grams paired with half the grams in protein.   -2nd/3rd trimester- minimum total daily carbohydrates 175 grams paired with half the grams in protein.   -Avoid fasting for greater than 8-10 hours overnight.   -Consult with Rheumatology and Neurology regarding plan of care prior to pregnancy.   -Preconception counseling with [facility] physician to discuss plan of care.   -Genetic counseling to review family medical history.   -Once a pregnancy occurs, schedule an appointment with diabetes team.   -Every week to every other week review of insulin pump download with recommendations.   -Every 4 to 6 week appointments with diabetes team.  -Starting at 12 weeks gestation, baby aspirin 162 mg daily as a pre-eclampsia preventative measure and to continue until 36 weeks gestation.    -Serial fetal growth ultrasounds will be recommended-NT; early anatomy ultrasound, 20 weeks detailed fetal growth then every 4 to 6 weeks as recommended.  -22-24 weeks fetal echo.  -At 32 weeks gestation, NST twice a week and ISELA weekly.   -OBGYN will primarily manage pregnancy.   -Endocrinology will manage hypothyroidism during pregnancy.   -MFM will manage diabetes during pregnancy.

## 2025-07-10 NOTE — TELEPHONE ENCOUNTER
[facility]  Please schedule a virtual visit or face-to-face preconception visit with [facility] physician.  Thanks,  Kassidy

## 2025-07-11 ENCOUNTER — HOSPITAL ENCOUNTER (OUTPATIENT)
Dept: INFUSION CENTER | Facility: CLINIC | Age: 30
End: 2025-07-11
Attending: INTERNAL MEDICINE
Payer: COMMERCIAL

## 2025-07-11 VITALS
OXYGEN SATURATION: 100 % | BODY MASS INDEX: 27.02 KG/M2 | HEART RATE: 88 BPM | SYSTOLIC BLOOD PRESSURE: 104 MMHG | WEIGHT: 143 LBS | TEMPERATURE: 97.7 F | RESPIRATION RATE: 16 BRPM | DIASTOLIC BLOOD PRESSURE: 71 MMHG

## 2025-07-11 DIAGNOSIS — M32.9 SYSTEMIC LUPUS ERYTHEMATOSUS, UNSPECIFIED SLE TYPE, UNSPECIFIED ORGAN INVOLVEMENT STATUS (HCC): Primary | ICD-10-CM

## 2025-07-11 PROCEDURE — 96413 CHEMO IV INFUSION 1 HR: CPT

## 2025-07-11 RX ORDER — DIPHENHYDRAMINE HCL 25 MG
25 TABLET ORAL ONCE
Status: COMPLETED | OUTPATIENT
Start: 2025-07-11 | End: 2025-07-11

## 2025-07-11 RX ORDER — SODIUM CHLORIDE 9 MG/ML
20 INJECTION, SOLUTION INTRAVENOUS ONCE
Status: COMPLETED | OUTPATIENT
Start: 2025-07-11 | End: 2025-07-11

## 2025-07-11 RX ORDER — ACETAMINOPHEN 325 MG/1
650 TABLET ORAL ONCE
Status: COMPLETED | OUTPATIENT
Start: 2025-07-11 | End: 2025-07-11

## 2025-07-11 RX ORDER — SODIUM CHLORIDE 9 MG/ML
20 INJECTION, SOLUTION INTRAVENOUS ONCE
OUTPATIENT
Start: 2025-08-01

## 2025-07-11 RX ORDER — DIPHENHYDRAMINE HCL 25 MG
25 TABLET ORAL ONCE
OUTPATIENT
Start: 2025-08-01

## 2025-07-11 RX ORDER — ACETAMINOPHEN 325 MG/1
650 TABLET ORAL ONCE
OUTPATIENT
Start: 2025-08-01

## 2025-07-11 RX ADMIN — DIPHENHYDRAMINE HYDROCHLORIDE 25 MG: 25 TABLET ORAL at 14:45

## 2025-07-11 RX ADMIN — ACETAMINOPHEN 650 MG: 325 TABLET ORAL at 14:45

## 2025-07-11 RX ADMIN — BELIMUMAB 640 MG: 400 INJECTION, POWDER, LYOPHILIZED, FOR SOLUTION INTRAVENOUS at 15:38

## 2025-07-11 RX ADMIN — SODIUM CHLORIDE 20 ML/HR: 0.9 INJECTION, SOLUTION INTRAVENOUS at 14:49

## 2025-07-11 NOTE — PROGRESS NOTES
Patient arrives to infusion center for Benlysta today. Patient denies any S/S infection/illness/antibiotic use. PIV placed without issue by CD RN. Patient resting on recliner chair, call bell within reach.

## 2025-07-11 NOTE — LETTER
[facility]  [address]  [address]  [place] [id number]  Dept: [phone]    July 11, 2025     Patient: Jenny Rodrigues   YOB: 1995   Date of Visit: 7/11/2025       To Whom it May Concern:    Jenny Rodrigues is under my professional care. She was seen in the hospital on 7/11/2025.    If you have any questions or concerns, please don't hesitate to call.         Sincerely,          Alissa Lockhart RN

## 2025-07-11 NOTE — PROGRESS NOTES
Patient tolerated Benlysta today without issue. PIV removed intact, coban wrap in place. Patient aware of next appt at [facility] infusion 8/8 at 1330, appt card provided, work note provided.

## 2025-07-14 PROBLEM — E03.8 OTHER SPECIFIED HYPOTHYROIDISM: Status: ACTIVE | Noted: 2021-08-18

## 2025-07-14 NOTE — PROGRESS NOTES
Assessment/Plan  Problem List Items Addressed This Visit     Encounter for gynecological examination (general) (routine) without abnormal findings - Primary     Annual exam and pap performed  rx sent via EMR x 1 year  RTO 1 year           Other Visit Diagnoses     Oral contraceptive pill surveillance        Relevant Medications    levonorgestrel-ethinyl estradiol (NORDETTE) 0 15-30 MG-MCG per tablet        Jo-Ann Barakat is a 26 y o  female who presents for annual GYN exam  She denies any changes in Medical, Surgical or Family  Periods are regular every 28-30 days pt on generic OCP, no problems  Dysmenorrhea:none  She denies intermenstrual bleeding, spotting, or discharge  She reports that she is sexually active with 1 partner and using condoms and OCP (estrogen/progesterone) for contraception  Regular self breast exam: yes    Family history of uterine or ovarian cancer: no  Family history of breast cancer: yes - [facility]  Family history of colon cancer: no    Menstrual History:  OB History      Para Term  AB Living    0 0 0 0 0 0    SAB TAB Ectopic Multiple Live Births    0 0 0 0 0         Patient's last menstrual period was 2018 (exact date)  Period Cycle (Days): 28  Period Duration (Days): 1-2  Period Pattern: Regular  Menstrual Flow: Light  Menstrual Control: Thin pad  Dysmenorrhea: None    The following portions of the patient's history were reviewed and updated as appropriate: allergies, current medications, past family history, past medical history, past social history, past surgical history and problem list     Review of Systems  Review of Systems   Constitutional: Negative for chills and fever  Respiratory: Negative for shortness of breath  Cardiovascular: Negative for chest pain  Gastrointestinal: Negative for abdominal pain     Genitourinary: Negative for dysuria, pelvic pain, vaginal bleeding, vaginal discharge and vaginal pain         (+)R breast lump, had US-benign, being followed by breast specialist, just observation  Negative for breast pain  Negative for stress urinary incontinence  Neurological: Negative for headaches  Objective   /62 (BP Location: Left arm, Patient Position: Sitting, Cuff Size: Standard)   Ht 5' 3" (1 6 m)   Wt 51 7 kg (114 lb)   LMP 03/17/2018 (Exact Date)   Breastfeeding? No   BMI 20 19 kg/m²     Physical Exam   Constitutional: She is oriented to person, place, and time  She appears well-developed and well-nourished  Neck: No thyromegaly present  Cardiovascular: Normal rate and regular rhythm  Pulmonary/Chest: Effort normal and breath sounds normal  Right breast exhibits no mass, no nipple discharge, no skin change and no tenderness  Left breast exhibits no mass, no nipple discharge, no skin change and no tenderness  Abdominal: Soft  There is no tenderness  There is no rebound and no guarding  Genitourinary: There is no rash or lesion on the right labia  There is no rash or lesion on the left labia  Uterus is not enlarged and not tender  Cervix exhibits no motion tenderness and no discharge  Right adnexum displays no mass and no tenderness  Left adnexum displays no mass and no tenderness  No bleeding in the vagina  No vaginal discharge found  Genitourinary Comments: Pap performed   Neurological: She is alert and oriented to person, place, and time  Psychiatric: She has a normal mood and affect  Nursing note and vitals reviewed        General   Mental Status - Well groomed; well nourished.  Oriented x 3.  Appropriate mood and affect    Integumentary   No rashes;  no suspicious lesions.     Head and Neck  Head - no lesions or palpable masses.   Neck -  normal   Thyroid - normal size and consistency;  no palpable nodules.      Chest and Lung Exam   Chest and lung exam - normal breath sounds    Cardiovascular   Cardiovascular examination  - normal heart sounds, regular rate and rhythm with no murmurs.     Breast  Left - Normal.  Right - Normal.     Abdomen   Inspection: - Normal.   Palpation/Percussion -  Normal  Auscultation:  - Bowel sounds normal.     Female Genitourinary     External Genitalia   Vulva: Normal.   Perineum: Normal.   Bartholin's Gland:  Normal.   Clitoris :  Normal.   Introitus:  Normal.   Urethra:  Normal.     Speculum & Bimanual   Vagina: -  Normal.   Vaginal Lesions - None.   Cervix: Characteristics - Normal.   Uterus: Characteristics - Normal.   Adnexa: - Normal.   Bladder - Normal.           Medical problems and test results were reviewed with the patient today.       ASSESSMENT and PLAN    1. Well woman exam  2. Encounter for Papanicolaou smear for cervical cancer screening  -     PAP LB, Reflex HPV ASCUS  3. History of ovarian cancer  -     ; Future             Chaperone utilized during exam      ALBERT PEREZ MD  7/16/2025

## 2025-07-17 ENCOUNTER — APPOINTMENT (EMERGENCY)
Dept: RADIOLOGY | Facility: HOSPITAL | Age: 30
End: 2025-07-17
Payer: COMMERCIAL

## 2025-07-17 ENCOUNTER — DOCUMENTATION (OUTPATIENT)
Dept: ENDOCRINOLOGY | Facility: CLINIC | Age: 30
End: 2025-07-17

## 2025-07-17 ENCOUNTER — NURSE TRIAGE (OUTPATIENT)
Age: 30
End: 2025-07-17

## 2025-07-17 ENCOUNTER — HOSPITAL ENCOUNTER (INPATIENT)
Facility: HOSPITAL | Age: 30
LOS: 1 days | Discharge: LEFT BEFORE DISCHARGE | End: 2025-07-18
Attending: EMERGENCY MEDICINE | Admitting: INTERNAL MEDICINE
Payer: COMMERCIAL

## 2025-07-17 ENCOUNTER — TELEPHONE (OUTPATIENT)
Dept: ENDOCRINOLOGY | Facility: CLINIC | Age: 30
End: 2025-07-17

## 2025-07-17 DIAGNOSIS — E10.65 TYPE 1 DIABETES MELLITUS WITH HYPERGLYCEMIA (HCC): ICD-10-CM

## 2025-07-17 DIAGNOSIS — E10.65 HYPERGLYCEMIA DUE TO TYPE 1 DIABETES MELLITUS (HCC): Primary | ICD-10-CM

## 2025-07-17 DIAGNOSIS — E10.649 UNCONTROLLED TYPE 1 DIABETES MELLITUS WITH HYPOGLYCEMIA WITHOUT COMA (HCC): ICD-10-CM

## 2025-07-17 DIAGNOSIS — R07.9 ACUTE CHEST PAIN: ICD-10-CM

## 2025-07-17 PROBLEM — E87.1 HYPONATREMIA: Status: ACTIVE | Noted: 2025-07-17

## 2025-07-17 PROBLEM — E11.65 HYPERGLYCEMIA DUE TO DIABETES MELLITUS (HCC): Status: ACTIVE | Noted: 2025-07-17

## 2025-07-17 LAB
ALBUMIN SERPL BCG-MCNC: 4.7 G/DL (ref 3.5–5)
ALP SERPL-CCNC: 80 U/L (ref 34–104)
ALT SERPL W P-5'-P-CCNC: 9 U/L (ref 7–52)
ANION GAP SERPL CALCULATED.3IONS-SCNC: 10 MMOL/L (ref 4–13)
APTT PPP: 25 SECONDS (ref 23–34)
AST SERPL W P-5'-P-CCNC: 13 U/L (ref 13–39)
B-OH-BUTYR SERPL-MCNC: 0.65 MMOL/L (ref 0.2–0.6)
BACTERIA UR QL AUTO: NORMAL /HPF
BASE EX.OXY STD BLDV CALC-SCNC: 94.5 % (ref 60–80)
BASE EXCESS BLDV CALC-SCNC: -2.9 MMOL/L
BASOPHILS # BLD AUTO: 0.04 THOUSANDS/ÂΜL (ref 0–0.1)
BASOPHILS NFR BLD AUTO: 1 % (ref 0–1)
BILIRUB SERPL-MCNC: 0.91 MG/DL (ref 0.2–1)
BILIRUB UR QL STRIP: NEGATIVE
BUN SERPL-MCNC: 15 MG/DL (ref 5–25)
CALCIUM SERPL-MCNC: 9.6 MG/DL (ref 8.4–10.2)
CARDIAC TROPONIN I PNL SERPL HS: <2 NG/L (ref ?–50)
CHLORIDE SERPL-SCNC: 98 MMOL/L (ref 96–108)
CLARITY UR: CLEAR
CO2 SERPL-SCNC: 23 MMOL/L (ref 21–32)
COLOR UR: COLORLESS
CREAT SERPL-MCNC: 0.82 MG/DL (ref 0.6–1.3)
EOSINOPHIL # BLD AUTO: 0.06 THOUSAND/ÂΜL (ref 0–0.61)
EOSINOPHIL NFR BLD AUTO: 1 % (ref 0–6)
ERYTHROCYTE [DISTWIDTH] IN BLOOD BY AUTOMATED COUNT: 12.3 % (ref 11.6–15.1)
EXT PREGNANCY TEST URINE: NEGATIVE
EXT. CONTROL: NORMAL
GFR SERPL CREATININE-BSD FRML MDRD: 97 ML/MIN/1.73SQ M
GLUCOSE SERPL-MCNC: 125 MG/DL (ref 65–140)
GLUCOSE SERPL-MCNC: 126 MG/DL (ref 65–140)
GLUCOSE SERPL-MCNC: 253 MG/DL (ref 65–140)
GLUCOSE SERPL-MCNC: 322 MG/DL (ref 65–140)
GLUCOSE SERPL-MCNC: 394 MG/DL (ref 65–140)
GLUCOSE SERPL-MCNC: 489 MG/DL (ref 65–140)
GLUCOSE SERPL-MCNC: 568 MG/DL (ref 65–140)
GLUCOSE SERPL-MCNC: 58 MG/DL (ref 65–140)
GLUCOSE UR STRIP-MCNC: ABNORMAL MG/DL
HCO3 BLDV-SCNC: 20.4 MMOL/L (ref 24–30)
HCT VFR BLD AUTO: 35.2 % (ref 34.8–46.1)
HGB BLD-MCNC: 11.3 G/DL (ref 11.5–15.4)
HGB UR QL STRIP.AUTO: NEGATIVE
IMM GRANULOCYTES # BLD AUTO: 0.02 THOUSAND/UL (ref 0–0.2)
IMM GRANULOCYTES NFR BLD AUTO: 0 % (ref 0–2)
INR PPP: 0.94 (ref 0.85–1.19)
KETONES UR STRIP-MCNC: NEGATIVE MG/DL
LACTATE SERPL-SCNC: 1.5 MMOL/L (ref 0.5–2)
LEUKOCYTE ESTERASE UR QL STRIP: ABNORMAL
LYMPHOCYTES # BLD AUTO: 2.03 THOUSANDS/ÂΜL (ref 0.6–4.47)
LYMPHOCYTES NFR BLD AUTO: 30 % (ref 14–44)
MCH RBC QN AUTO: 26.4 PG (ref 26.8–34.3)
MCHC RBC AUTO-ENTMCNC: 32.1 G/DL (ref 31.4–37.4)
MCV RBC AUTO: 82 FL (ref 82–98)
MONOCYTES # BLD AUTO: 0.51 THOUSAND/ÂΜL (ref 0.17–1.22)
MONOCYTES NFR BLD AUTO: 8 % (ref 4–12)
NEUTROPHILS # BLD AUTO: 4.08 THOUSANDS/ÂΜL (ref 1.85–7.62)
NEUTS SEG NFR BLD AUTO: 60 % (ref 43–75)
NITRITE UR QL STRIP: NEGATIVE
NON-SQ EPI CELLS URNS QL MICRO: NORMAL /HPF
NRBC BLD AUTO-RTO: 0 /100 WBCS
O2 CT BLDV-SCNC: 16.5 ML/DL
PCO2 BLDV: 31 MM HG (ref 42–50)
PH BLDV: 7.44 [PH] (ref 7.3–7.4)
PH UR STRIP.AUTO: 6.5 [PH]
PLATELET # BLD AUTO: 204 THOUSANDS/UL (ref 149–390)
PMV BLD AUTO: 12.4 FL (ref 8.9–12.7)
PO2 BLDV: 79.2 MM HG (ref 35–45)
POTASSIUM SERPL-SCNC: 4.1 MMOL/L (ref 3.5–5.3)
PROCALCITONIN SERPL-MCNC: 0.08 NG/ML
PROT SERPL-MCNC: 7.4 G/DL (ref 6.4–8.4)
PROT UR STRIP-MCNC: NEGATIVE MG/DL
PROTHROMBIN TIME: 13.2 SECONDS (ref 12.3–15)
RBC # BLD AUTO: 4.28 MILLION/UL (ref 3.81–5.12)
RBC #/AREA URNS AUTO: NORMAL /HPF
SODIUM SERPL-SCNC: 131 MMOL/L (ref 135–147)
SP GR UR STRIP.AUTO: 1.03 (ref 1–1.03)
UROBILINOGEN UR STRIP-ACNC: <2 MG/DL
WBC # BLD AUTO: 6.74 THOUSAND/UL (ref 4.31–10.16)
WBC #/AREA URNS AUTO: NORMAL /HPF

## 2025-07-17 PROCEDURE — 84145 PROCALCITONIN (PCT): CPT | Performed by: EMERGENCY MEDICINE

## 2025-07-17 PROCEDURE — 83605 ASSAY OF LACTIC ACID: CPT | Performed by: EMERGENCY MEDICINE

## 2025-07-17 PROCEDURE — 96374 THER/PROPH/DIAG INJ IV PUSH: CPT

## 2025-07-17 PROCEDURE — 71045 X-RAY EXAM CHEST 1 VIEW: CPT

## 2025-07-17 PROCEDURE — 85610 PROTHROMBIN TIME: CPT | Performed by: EMERGENCY MEDICINE

## 2025-07-17 PROCEDURE — 87040 BLOOD CULTURE FOR BACTERIA: CPT | Performed by: EMERGENCY MEDICINE

## 2025-07-17 PROCEDURE — 85730 THROMBOPLASTIN TIME PARTIAL: CPT | Performed by: EMERGENCY MEDICINE

## 2025-07-17 PROCEDURE — 82948 REAGENT STRIP/BLOOD GLUCOSE: CPT

## 2025-07-17 PROCEDURE — 81025 URINE PREGNANCY TEST: CPT | Performed by: EMERGENCY MEDICINE

## 2025-07-17 PROCEDURE — 82010 KETONE BODYS QUAN: CPT | Performed by: EMERGENCY MEDICINE

## 2025-07-17 PROCEDURE — 96361 HYDRATE IV INFUSION ADD-ON: CPT

## 2025-07-17 PROCEDURE — 93005 ELECTROCARDIOGRAM TRACING: CPT

## 2025-07-17 PROCEDURE — 81001 URINALYSIS AUTO W/SCOPE: CPT | Performed by: EMERGENCY MEDICINE

## 2025-07-17 PROCEDURE — 84484 ASSAY OF TROPONIN QUANT: CPT | Performed by: EMERGENCY MEDICINE

## 2025-07-17 PROCEDURE — 85025 COMPLETE CBC W/AUTO DIFF WBC: CPT | Performed by: EMERGENCY MEDICINE

## 2025-07-17 PROCEDURE — 99223 1ST HOSP IP/OBS HIGH 75: CPT | Performed by: INTERNAL MEDICINE

## 2025-07-17 PROCEDURE — 99291 CRITICAL CARE FIRST HOUR: CPT | Performed by: EMERGENCY MEDICINE

## 2025-07-17 PROCEDURE — 36415 COLL VENOUS BLD VENIPUNCTURE: CPT | Performed by: EMERGENCY MEDICINE

## 2025-07-17 PROCEDURE — 99285 EMERGENCY DEPT VISIT HI MDM: CPT

## 2025-07-17 PROCEDURE — 82805 BLOOD GASES W/O2 SATURATION: CPT | Performed by: EMERGENCY MEDICINE

## 2025-07-17 PROCEDURE — 80053 COMPREHEN METABOLIC PANEL: CPT | Performed by: EMERGENCY MEDICINE

## 2025-07-17 RX ORDER — LEVOTHYROXINE SODIUM 25 UG/1
25 TABLET ORAL DAILY
Status: DISCONTINUED | OUTPATIENT
Start: 2025-07-18 | End: 2025-07-18 | Stop reason: HOSPADM

## 2025-07-17 RX ORDER — POLYETHYLENE GLYCOL 3350 17 G/17G
17 POWDER, FOR SOLUTION ORAL DAILY PRN
Status: DISCONTINUED | OUTPATIENT
Start: 2025-07-17 | End: 2025-07-18 | Stop reason: HOSPADM

## 2025-07-17 RX ORDER — ONDANSETRON 2 MG/ML
4 INJECTION INTRAMUSCULAR; INTRAVENOUS ONCE
Status: COMPLETED | OUTPATIENT
Start: 2025-07-17 | End: 2025-07-17

## 2025-07-17 RX ORDER — ACETAMINOPHEN 325 MG/1
650 TABLET ORAL EVERY 6 HOURS PRN
Status: DISCONTINUED | OUTPATIENT
Start: 2025-07-17 | End: 2025-07-17

## 2025-07-17 RX ORDER — LORAZEPAM 1 MG/1
1 TABLET ORAL EVERY 8 HOURS PRN
Status: DISCONTINUED | OUTPATIENT
Start: 2025-07-17 | End: 2025-07-18 | Stop reason: HOSPADM

## 2025-07-17 RX ORDER — ONDANSETRON 2 MG/ML
4 INJECTION INTRAMUSCULAR; INTRAVENOUS EVERY 8 HOURS PRN
Status: DISCONTINUED | OUTPATIENT
Start: 2025-07-17 | End: 2025-07-18 | Stop reason: HOSPADM

## 2025-07-17 RX ORDER — ACETAMINOPHEN 325 MG/1
975 TABLET ORAL EVERY 8 HOURS PRN
Status: DISCONTINUED | OUTPATIENT
Start: 2025-07-17 | End: 2025-07-18 | Stop reason: HOSPADM

## 2025-07-17 RX ORDER — GABAPENTIN 300 MG/1
600 CAPSULE ORAL DAILY
Status: DISCONTINUED | OUTPATIENT
Start: 2025-07-18 | End: 2025-07-17

## 2025-07-17 RX ORDER — DEXTROSE MONOHYDRATE 25 G/50ML
INJECTION, SOLUTION INTRAVENOUS
Status: DISPENSED
Start: 2025-07-17 | End: 2025-07-18

## 2025-07-17 RX ORDER — HYDROXYCHLOROQUINE SULFATE 200 MG/1
400 TABLET, FILM COATED ORAL
Status: DISCONTINUED | OUTPATIENT
Start: 2025-07-17 | End: 2025-07-18 | Stop reason: HOSPADM

## 2025-07-17 RX ORDER — HYDROXYCHLOROQUINE SULFATE 200 MG/1
200 TABLET, FILM COATED ORAL
Status: DISCONTINUED | OUTPATIENT
Start: 2025-07-19 | End: 2025-07-18 | Stop reason: HOSPADM

## 2025-07-17 RX ORDER — GABAPENTIN 300 MG/1
600 CAPSULE ORAL DAILY
Status: DISCONTINUED | OUTPATIENT
Start: 2025-07-17 | End: 2025-07-18 | Stop reason: HOSPADM

## 2025-07-17 RX ORDER — POTASSIUM CHLORIDE 1500 MG/1
40 TABLET, EXTENDED RELEASE ORAL ONCE
Status: COMPLETED | OUTPATIENT
Start: 2025-07-17 | End: 2025-07-17

## 2025-07-17 RX ADMIN — Medication: at 21:46

## 2025-07-17 RX ADMIN — SODIUM CHLORIDE 1000 ML: 0.9 INJECTION, SOLUTION INTRAVENOUS at 15:29

## 2025-07-17 RX ADMIN — Medication 4 G: at 21:29

## 2025-07-17 RX ADMIN — GABAPENTIN 600 MG: 300 CAPSULE ORAL at 22:55

## 2025-07-17 RX ADMIN — SODIUM CHLORIDE 15 UNITS/HR: 9 INJECTION, SOLUTION INTRAVENOUS at 16:59

## 2025-07-17 RX ADMIN — POTASSIUM CHLORIDE 40 MEQ: 1500 TABLET, EXTENDED RELEASE ORAL at 17:21

## 2025-07-17 RX ADMIN — SODIUM CHLORIDE 4 UNITS/HR: 9 INJECTION, SOLUTION INTRAVENOUS at 22:55

## 2025-07-17 RX ADMIN — ONDANSETRON 4 MG: 2 INJECTION, SOLUTION INTRAMUSCULAR; INTRAVENOUS at 15:22

## 2025-07-17 RX ADMIN — HYDROXYCHLOROQUINE SULFATE 400 MG: 200 TABLET ORAL at 22:55

## 2025-07-17 NOTE — H&P
H&P - Hospitalist   Name: Jenny Rodrigues 30 y.o. female I MRN: [id number]  Unit/Bed#: S -01 I Date of Admission: 7/17/2025   Date of Service: 7/17/2025 I Hospital Day: 0     Assessment & Plan  Type 1 diabetes mellitus with hyperglycemia (HCC)  Lab Results   Component Value Date    HGBA1C 6.9 (H) 06/09/2025       Recent Labs     07/17/25  1401 07/17/25  1658 07/17/25  1754 07/17/25  1932   POCGLU 394* 489* 322* 126       Blood Sugar Average: Last 72 hrs:  (P) 332.75  Presents with history of blood sugars in the 500s to 600 over the last 3 days with limited response to insulin  History of multiple hospitalizations for DKA; last hospitalization in June 2024  Presents with symptoms of polyuria, polydipsia, fatigue, weakness, lightheadedness  Glucose 568 on admission,  and UA positive for glucose  Anion gap 10  Patient typically uses 32 units of long-acting insulin; she estimates she uses between 50 to 90 units/day; she uses 1 unit for every 3 carbs to correct for sugars greater than 110 at meals  After patient speaking with endocrinologist this morning, hyperglycemia is likely secondary to pump malfunction    Plan  Continue insulin drip  Endocrinology consulted for management of insulin and pump settings  CBC, BMP and magnesium levels at midnight in the a.m.; monitor for hypokalemia  Fingerstick glucose every 2 hours  Hypoglycemia protocol    Hyponatremia  Sodium 131 on arrival  Status post IV fluid bolus in the ED    Plan  Follow-up CMP at midnight in a.m.  Hashimoto's disease  Continue levothyroxine 25mcg  Neuropathy  Continue gabapentin 600mg  Anxiety  Continue Ativan 1mg as needed  Systemic lupus erythematosus (HCC)  On Belimumab injections monthly, hydroxychloroquine weekly, and methotrexate weekly      VTE Pharmacologic Prophylaxis: VTE Score: 2 Low Risk (Score 0-2) - Encourage Ambulation.  Code Status: Level 1 - Full Code   Discussion with family: Attempted to update  () via  phone. Unable to contact.    Anticipated Length of Stay: Patient will be admitted on an inpatient basis with an anticipated length of stay of greater than 2 midnights secondary to hyperglycemia.    History of Present Illness   Chief Complaint: Hyperglycemia    Jenny Rodrigues is a 30 y.o. female with a PMH of type 1 diabetes, lupus, hashimoto's thyroiditis, neuropathy, migraines who presents with 2-day history of hyperglycemia.  Patient endorses her blood sugars have been between 500 and 600 over the last few days, even upon waking in the morning.  She has been trying to correct the blood sugars with additional units of insulin.  She increased the basal bolus level at night as well.  No corrections have been improving her blood sugar levels.  She was diagnosed with type 1 diabetes in 2019.  She notes that she has experienced several episodes of diabetic ketoacidosis (about 20 over the last 5 years).  Last time she was hospitalized for DKA was in June 2024.  She follows with her endocrinologist every 3 months.  She spoke with her endocrinologist this morning who suspects that her pump is malfunctioning.  She was advised to alter the basal bolus regimen but if her blood sugars not improved below 350 should go to the emergency department.  This morning for breakfast she had yogurt, eggs, and cheese.  Prior to coming to the ED, she had an additional cheese stick.  She endorses polyuria, polydipsia, fatigue, lightheadedness, weakness and abdominal pain.  She experienced chest pain and shortness of breath prior to coming to the ED which had since improved.    As per patient, she typically uses between 50 to 90 units of insulin per day.  Her long-acting insulin is set at 32 units.  For mealtimes, she uses 1 unit for every 3 carbs to correct for sugars greater than 110.  She uses a Tandem insulin pump.  Typically she starts showing symptoms of hypoglycemia when her blood sugar drops between 30-40.  If she needs glucagon  "for rescue through the IV, it is preferred that she receives it in a diluted suspension.    In the ED, vital signs were stable.  Labs were notable for glucose of 568 and sodium of 131.  Anion gap within normal range at 10.  Troponins within normal limits.  UA positive for glucose.  Chest x-ray showed no acute findings.  EKG showed normal sinus rhythm.  In the ED, she received 40mEq of potassium chloride, sodium chloride bolus, Zofran, and started on insulin drip.    Review of Systems   Constitutional:  Positive for fatigue. Negative for chills and fever.   HENT:  Negative for congestion and sore throat.    Eyes:  Negative for visual disturbance.   Respiratory:  Negative for cough and shortness of breath.    Cardiovascular:  Negative for chest pain and palpitations.   Gastrointestinal:  Positive for abdominal pain. Negative for constipation, diarrhea, nausea and vomiting.   Endocrine: Positive for polydipsia and polyuria.   Genitourinary:  Positive for frequency and urgency. Negative for dysuria.   Neurological:  Positive for light-headedness. Negative for syncope and speech difficulty.       Historical Information   Past Medical History[1]  Past Surgical History[2]  Social History[3]  E-Cigarette/Vaping    E-Cigarette Use Never User      E-Cigarette/Vaping Substances    Nicotine No     THC No     CBD No     Flavoring No     Other No     Unknown No          Meds/Allergies   I have reviewed home medications with patient personally.  Prior to Admission medications    Medication Sig Start Date End Date Taking? Authorizing Provider   acetaminophen (TYLENOL) 325 mg tablet Take 3 tablets (975 mg total) by mouth every 8 (eight) hours as needed for mild pain or headaches 5/5/24  Yes Lila Lynn PA-C   BD Insulin Syringe U/F 31G X 5/16\" 0.5 ML MISC Use as directly with weekly methotrexate injection. 12/23/24  Yes Pete Hilton,    Belimumab (BENLYSTA IV) Inject into a catheter in a vein every month to absorb " continually Switching to monthly on 12/5   Yes Historical Provider, MD   Cyanocobalamin 1000 MCG SUBL Place 1 tablet (1,000 mcg total) under the tongue daily 1/26/24  Yes Alma Espitia MD   folic acid (FOLVITE) 1 mg tablet TAKE 1 TABLET BY MOUTH EVERY DAY 5/28/24  Yes Chelle Tanner MD   gabapentin (Neurontin) 600 MG tablet Take 1 tablet (600 mg total) by mouth daily 2/25/25  Yes Chelle Tanner MD   hydroxychloroquine (PLAQUENIL) 200 mg tablet Take 1 tab twice daily on M-F and 1 tab once daily on the weekend. 2/12/25 2/12/26 Yes Chelle Tanner MD   insulin degludec (Tresiba FlexTouch) 100 units/mL injection pen INJECT 32 UNITS DAILY 3/11/25  Yes Pete Hilton DO   Insulin Infusion Pump (T:slim X2 Ins Pump/Control-IQ) ANURAG Use   Yes Historical Provider, MD   levothyroxine 25 mcg tablet Take 1 tablet (25 mcg total) by mouth daily 12/23/24  Yes Pete Hilton DO   LORazepam (ATIVAN) 1 mg tablet Take 1 tablet (1 mg total) by mouth every 8 (eight) hours as needed for anxiety 7/2/25  Yes Osvaldo Betancur,    metFORMIN (GLUCOPHAGE-XR) 500 mg 24 hr tablet Take 2 tablets (1,000 mg total) by mouth 2 (two) times a day with meals 12/23/24  Yes Pete Hilton DO   methotrexate 50 MG/2ML injection INJECT 0.8 ML UNDER THE SKIN ONCE EVERY 7 DAYS. DISCARD UNUSED REMAINDER 30 DAYS AFTER INITIAL USE 12/10/24  Yes Chelle Tanner MD   NovoLOG 100 UNIT/ML injection Up to 100 units per day with insulin pump 12/23/24  Yes Pete Hilton DO   Tresiba FlexTouch 100 units/mL injection pen Inject 32 Units under the skin daily 12/23/24  Yes Pete Hilton DO   albuterol (ProAir HFA) 90 mcg/act inhaler Inhale 2 puffs every 6 (six) hours as needed for wheezing or shortness of breath  Patient not taking: Reported on 7/17/2025 12/30/24   Susana A Mattie, CRNP   Altavera 0.15-30 MG-MCG per tablet TAKE 1 TABLET BY MOUTH EVERY DAY  Patient not taking: Reported on 7/17/2025 6/30/25   Sonia Whitehead MD    benzonatate (TESSALON) 200 MG capsule Take 1 capsule (200 mg total) by mouth 3 (three) times a day as needed for cough  Patient not taking: Reported on 7/17/2025 12/30/24   MARGARET Jiang   Botox 200 units SOLR  5/23/25   Historical Provider, MD   clindamycin (CLEOCIN T) 1 % lotion Apply 1 application. topically in the morning and 1 application. in the evening.  Patient not taking: Reported on 7/17/2025 10/17/23   Historical Provider, MD   EPINEPHrine (EPIPEN) 0.3 mg/0.3 mL SOAJ Inject 0.3 mL (0.3 mg total) into a muscle once for 1 dose 6/21/24 6/20/25  MARGARET Meadows   famotidine (PEPCID) 40 MG tablet Take 1 tablet (40 mg total) by mouth daily at bedtime  Patient not taking: Reported on 7/17/2025 8/1/24   Jn Hidalgo MD   fluticasone (FLONASE) 50 mcg/act nasal spray SPRAY 1 SPRAY INTO EACH NOSTRIL EVERY DAY  Patient not taking: Reported on 7/17/2025 2/4/25   Debi Jackson MD   Glucagon (Gvoke HypoPen 2-Pack) 1 MG/0.2ML SOAJ 1 mg PRN for severe hypoglycemia 2/14/24   Emily Perales MD   Glucagon HCl (Glucagon Emergency) 1 MG/ML SOLR  10/27/22   Historical Provider, MD   Insulin Pen Needle (BD PEN NEEDLE NASIM U/F) 32G X 4 MM MISC by Does not apply route 4 (four) times a day for 180 days 6/5/19 6/20/25  Derick Barrientos PA-C   ketorolac (TORADOL) 10 mg tablet Take 1 tablet (10 mg total) by mouth every 6 (six) hours as needed (migraine) Max 2-3 per week. 10/10/24   Paola Phillips PA-C   ondansetron (ZOFRAN-ODT) 4 mg disintegrating tablet Take 1 tablet (4 mg total) by mouth every 6 (six) hours as needed for nausea or vomiting for up to 12 doses  Patient not taking: Reported on 7/17/2025 4/6/25   Nancy Chino PA-C   pantoprazole (PROTONIX) 40 mg tablet TAKE 1 TABLET BY MOUTH EVERY DAY  Patient not taking: Reported on 7/17/2025 5/27/25   Giselle Smart MD   promethazine (PHENERGAN) 25 mg tablet Take 1 tablet (25 mg total) by mouth every 6 (six) hours as needed for nausea or  vomiting for up to 3 days  Patient not taking: Reported on 6/20/2025 1/11/25 2/12/25  Benjamin Iqbal MD   rizatriptan (MAXALT-MLT) 10 mg disintegrating tablet 1 tab at migraine onset, repeat after 2 hours if needed; Max 2 per day and 3 per week. 10/10/24   Paola Phillips PA-C   sucralfate (CARAFATE) 1 g tablet Take 1 tablet (1 g total) by mouth 4 (four) times a day  Patient not taking: Reported on 7/17/2025 10/30/24   Giselle Smart MD     Allergies   Allergen Reactions    Shellfish-Derived Products - Food Allergy Anaphylaxis     Octopus, sea snails, claims, etc (ask patient)    Insulin Glargine Other (See Comments)     LANTUS BRAND -Burning and redness under skin        Objective :  Temp:  [98.5 °F (36.9 °C)] 98.5 °F (36.9 °C)  HR:  [81-95] 85  BP: (118-121)/(80-89) 118/89  Resp:  [16-18] 16  SpO2:  [99 %] 99 %  O2 Device: None (Room air)    Physical Exam  Vitals reviewed.   Constitutional:       General: She is not in acute distress.     Appearance: Normal appearance. She is well-developed.      Comments: Pale   HENT:      Head: Normocephalic and atraumatic.     Eyes:      Extraocular Movements: Extraocular movements intact.      Conjunctiva/sclera: Conjunctivae normal.      Pupils: Pupils are equal, round, and reactive to light.       Cardiovascular:      Rate and Rhythm: Normal rate and regular rhythm.      Heart sounds: No murmur heard.  Pulmonary:      Effort: Pulmonary effort is normal. No respiratory distress.      Breath sounds: Normal breath sounds.   Abdominal:      Palpations: Abdomen is soft.      Tenderness: There is abdominal tenderness.     Musculoskeletal:         General: No swelling.      Cervical back: Neck supple.      Right lower leg: No edema.      Left lower leg: No edema.     Skin:     General: Skin is warm and dry.      Capillary Refill: Capillary refill takes less than 2 seconds.     Neurological:      Mental Status: She is alert and oriented to person, place, and time.     Psychiatric:          Mood and Affect: Mood normal.          Lines/Drains: Peripheral IV          Lab Results: I have reviewed the following results:  Results from last 7 days   Lab Units 07/17/25  1511   WBC Thousand/uL 6.74   HEMOGLOBIN g/dL 11.3*   HEMATOCRIT % 35.2   PLATELETS Thousands/uL 204   SEGS PCT % 60   LYMPHO PCT % 30   MONO PCT % 8   EOS PCT % 1     Results from last 7 days   Lab Units 07/17/25  1511   SODIUM mmol/L 131*   POTASSIUM mmol/L 4.1   CHLORIDE mmol/L 98   CO2 mmol/L 23   BUN mg/dL 15   CREATININE mg/dL 0.82   ANION GAP mmol/L 10   CALCIUM mg/dL 9.6   ALBUMIN g/dL 4.7   TOTAL BILIRUBIN mg/dL 0.91   ALK PHOS U/L 80   ALT U/L 9   AST U/L 13   GLUCOSE RANDOM mg/dL 568*     Results from last 7 days   Lab Units 07/17/25  1511   INR  0.94     Results from last 7 days   Lab Units 07/17/25  1932 07/17/25  1754 07/17/25  1658 07/17/25  1401   POC GLUCOSE mg/dl 126 322* 489* 394*     Lab Results   Component Value Date    HGBA1C 6.9 (H) 06/09/2025    HGBA1C 7.9 (H) 03/13/2025    HGBA1C 9.0 (H) 12/04/2024     Results from last 7 days   Lab Units 07/17/25  1511   LACTIC ACID mmol/L 1.5   PROCALCITONIN ng/ml 0.08     XR chest 1 view portable   ED Interpretation by Renato Hayward MD (07/17 1757)   Per my independent interpretation: No acute cardiopulmonary process           Administrative Statements   I have spent a total time of 60 minutes in caring for this patient on the day of the visit/encounter including Counseling / Coordination of care, Documenting in the medical record, Reviewing/placing orders in the medical record (including tests, medications, and/or procedures), Obtaining or reviewing history  , and Communicating with other healthcare professionals .    Electronically signed,  Jenny Green D.O.  Neurology Resident      ** Please Note: This note has been constructed using a voice recognition system. **         [1]   Past Medical History:  Diagnosis Date    Abdominal pain     Anaphylaxis     Anxiety      Anxiety and depression     COVID-19 12/2020    Delayed emergence from anesthesia 03/23/2023    Severe episode of emergence delirium (confused, combative) after MAC anesthetic on 03/2023 for EGD. Received versed 2mg, >50mcg precedex, 5mg IV haldol, and 50mcg fentanyl. Waxing and waning episodes of agitation. Any future anesthetics should NOT be done at [facility].    Delirium, induced by drug     anesthesia    Depression     Diabetes mellitus (HCC)     Disease of thyroid gland     Hashimoto    DKA, type 1 (HCC) 05/11/2021    Eating disorder     history of anorexia/bulemia [id number]    Fatigue     Food intolerance     Fracture of fibula     R Salter I    Gilbert disease     Hashimoto's disease 02/21/2020    Head injury     Headache(784.0)     Migraine     Nasal congestion     PTSD (post-traumatic stress disorder)     Rectal bleed     Seizures (HCC)     Type 1 diabetes (HCC)    [2]   Past Surgical History:  Procedure Laterality Date    COLONOSCOPY      EGD  03/23/2023    KNEE SURGERY Right 07/06/2020    NASAL SEPTOPLASTY W/ TURBINOPLASTY      SINUS SURGERY      SKIN BIOPSY      TURBINOPLASTY N/A 03/22/2021    Procedure: TURBINOPLASTY;  Surgeon: Jn Hidalgo MD;  Location: [facility];  Service: ENT    UPPER GASTROINTESTINAL ENDOSCOPY      WISDOM TOOTH EXTRACTION      WRIST SURGERY      left; Excision of ganglion   [3]   Social History  Tobacco Use    Smoking status: Never     Passive exposure: Never    Smokeless tobacco: Never    Tobacco comments:     Tobacco smoke exposure (Father smokes cigars)   Vaping Use    Vaping status: Never Used   Substance and Sexual Activity    Alcohol use: Not Currently     Comment: rarely    Drug use: No    Sexual activity: Yes     Partners: Male     Birth control/protection: OCP

## 2025-07-17 NOTE — ASSESSMENT & PLAN NOTE
Sodium 131 on arrival  Status post IV fluid bolus in the ED    Plan  Follow-up CMP at midnight in a.m.

## 2025-07-17 NOTE — ASSESSMENT & PLAN NOTE
Lab Results   Component Value Date    HGBA1C 6.9 (H) 06/09/2025       Recent Labs     07/17/25  1401 07/17/25  1658 07/17/25  1754 07/17/25  1932   POCGLU 394* 489* 322* 126       Blood Sugar Average: Last 72 hrs:  (P) 332.75  Presents with history of blood sugars in the 500s to 600 over the last 3 days with limited response to insulin  History of multiple hospitalizations for DKA; last hospitalization in June 2024  Presents with symptoms of polyuria, polydipsia, fatigue, weakness, lightheadedness  Glucose 568 on admission,  and UA positive for glucose  Anion gap 10  Patient typically uses 32 units of long-acting insulin; she estimates she uses between 50 to 90 units/day; she uses 1 unit for every 3 carbs to correct for sugars greater than 110 at meals  After patient speaking with endocrinologist this morning, hyperglycemia is likely secondary to pump malfunction    Plan  Continue insulin drip  Endocrinology consulted for management of insulin and pump settings  CBC, BMP and magnesium levels at midnight in the a.m.; monitor for hypokalemia  Fingerstick glucose every 2 hours  Hypoglycemia protocol

## 2025-07-17 NOTE — TELEPHONE ENCOUNTER
REASON FOR CONVERSATION: Hyperglycemia    SYMPTOMS: nausea, fatigue and dry mouth    OTHER HEALTH INFORMATION: patient usees an insulin pump.    PROTOCOL DISPOSITION: Discuss With PCP and Callback by Nurse Within 1 Hour (overriding Go to ED/UCC Now (Or to Office with PCP Approval))    CARE ADVICE PROVIDED: TREATMENT - LIQUIDS: * Drink at least one glass (8 oz or 240 ml) of water per hour for the next 4 hours. (Reason: adequate hydration will help lower blood sugar). * Generally, you should try to drink 6 to 8 glasses (1,500 to 2,000 ml) of water each day.  CALL BACK IF: * Blood glucose over 300 mg/dL (16.7 mmol/L), two or more times in a row. * Urine ketones become moderate or large (or more than 1+); if you check blood ketones, blood ketone test over 1.4 mmol/L. * Vomiting lasting over 4 hours or unable to drink any fluids * Rapid breathing occurs * You become worse or have more questions    PRACTICE FOLLOW-UP: call patient ASAP with provider recommendations      Patient called to report high blood sugars since Saturday. She reports yesterday and today her BG is over 600 via fingerstick. Her Dexcom sensor  2 days ago and she has not put a new one back on because she states it does not correct her pump accurately when her sugars are high. Patient has changed her pump site multiple times which has not helped. She has given corrections via pump and insulin pen injection to help with highs. She reports that yesterday her BG was over 600 until 10 PM. This morning her FS meter read HIGH BG over 600. She reports dry mouth, nausea and fatigue. She currently takes Novolog via pump and Metformin 500 mg 2 tabs twice daily. She does have an order for Tresiba in case of pump failure she reports not taking that for a few months. Per protocol, patient should be evaluated in the ED, she refused ED treatment stating she would like to see if the doctors corrections/recommendations help first.  Please call patient ASAP with  "recommendations or to reinforce ED evaluation.              Reason for Disposition   Blood glucose > 500 mg/dL (27.8 mmol/L)    Answer Assessment - Initial Assessment Questions  1. BLOOD GLUCOSE: \"What is your blood glucose level?\"       Over 600    2. ONSET: \"When did you check the blood glucose?\"      Fingerstick right before call    3. USUAL RANGE: \"What is your glucose level usually?\" (e.g., usual fasting morning value, usual evening value)      Am usually in 150 range.     4. KETONES: \"Do you check for ketones (urine or blood test strips)?\" If Yes, ask: \"What does the test show now?\"       Does check but does not have the strips    5. TYPE 1 or 2:  \"Do you know what type of diabetes you have?\"  (e.g., Type 1, Type 2, Gestational; doesn't know)       Type 1     6. INSULIN: \"Do you take insulin?\" \"What type of insulin(s) do you use? What is the mode of delivery? (syringe, pen; injection or pump)?\"       Novolog via pump    7. DIABETES PILLS: \"Do you take any pills for your diabetes?\" If Yes, ask: \"Have you missed taking any pills recently?\"      Metformin     8. OTHER SYMPTOMS: \"Do you have any symptoms?\" (e.g., fever, frequent urination, difficulty breathing, dizziness, weakness, vomiting)      Really tired and dry mouth and nausea    9. PREGNANCY: \"Is there any chance you are pregnant?\" \"When was your last menstrual period?\"      \"I dont think so\"    Protocols used: Diabetes - High Blood Sugar-Adult-OH    "

## 2025-07-17 NOTE — ED PROVIDER NOTES
Time reflects when diagnosis was documented in both MDM as applicable and the Disposition within this note       Time User Action Codes Description Comment    7/17/2025  4:19 PM Renato Hayward Add [E10.65] Hyperglycemia due to type 1 diabetes mellitus (HCC)     7/17/2025  4:19 PM Renato Hayward Add [R07.9] Acute chest pain           ED Disposition       ED Disposition   Admit    Condition   Stable    Date/Time   Thu Jul 17, 2025  4:57 PM    Comment   Case was discussed with JODI and the patient's admission status was agreed to be Admission Status: inpatient status to the service of Dr. Strange  .               Assessment & Plan       Medical Decision Making  Patient is a 30-year-old female, with a history significant for type 1 diabetes with prior episodes of DKA per my review of the medical record, who presents to the ED today for evaluation of a multiple day history of hyperglycemia with glucoses in the 500-600 range.  Patient reports associated polyuria, fatigue, nausea, generalized cramping abdominal pain, dyspnea, sharp nonradiating/exertional/pleuritic chest pain.  She states that all physical symptoms have occurred previously with elevated sugars.  Patient denies trauma.  Patient attempted giving insulin subcu boluses as well as changing location of her insulin pump but symptoms have persisted.  No clear exacerbating factors.  Patient denies fever, dysuria, rash, weakness, numbness, recent trauma or surgery, history of VTE, hemoptysis, contraceptive use.  Per my review the medical record, patient's discussions with endocrinology notable for concern for pump malfunction.  Patient is currently afebrile and hemodynamically stable.  Physical exam is notable for diffuse abdominal tenderness with no guarding or rebound, clear lungs, no lower extremity edema or pain with calf squeeze.  This presentation is concerning for: DKA, hyperglycemia, ACS, pregnancy/pregnancy complication, insulin pump  malfunction, UBALDO, electrolyte abnormality.  Patient at risk for UTI.  Will investigate with cardiac workup, sepsis panel order set, beta hydroxybutyrate, pregnancy test, VBG.  Will manage with fluids, Zofran, further based upon workup.    Amount and/or Complexity of Data Reviewed  Labs: ordered. Decision-making details documented in ED Course.  Radiology: ordered and independent interpretation performed.    Risk  Prescription drug management.  Decision regarding hospitalization.        ED Course as of 07/17/25 1757   Thu Jul 17, 2025   1430 POC Glucose(!): 394  Elevated   1516 PREGNANCY TEST URINE: Negative  WNL   1544 Beta- Hydroxybutyrate(!): 0.65  Elevated   1544 pH, Dariel(!): 7.436  Not acidotic   1544 Hemoglobin(!): 11.3  Low, slightly lower than previous   1545 ECG per my independent interpretation: Normal rate, regular rhythm, no ectopy, normal axis, no ST elevations or depressions     1545 WBC: 6.74  WNL -patient does not meet SIRS   1602 ANION GAP: 10  Within normal limits   1602 GLUCOSE(!!): 568  Elevated   1618 hs TnI 0hr: <2  WNL   1631 Discussed case with Dr. Kaiser from endocrinology, insulin infusion recommended.  Endocrinology will evaluate tomorrow     1651 Recommendations and results to this point reviewed with patient.  Patient agreeable with admission   1658 Critical Care Time Statement: Upon my evaluation, this patient had a high probability of imminent or life-threatening deterioration due to hyperglycemia requiring insulin infusion, which required my direct attention, intervention, and personal management.  I spent a total of 31 minutes directly providing critical care services, including interpretation of complex medical databases, evaluating for the presence of life-threatening injuries or illnesses, complex medical decision making (to support/prevent further life-threatening deterioration)., interpretation of hemodynamic data, and titration of continuous IV medications (drips), discussion  with consultant. This time is exclusive of procedures, teaching, treating other patients, family meetings, and any prior time recorded by providers other than myself.          Medications   insulin regular (HumuLIN R,NovoLIN R) 1 Units/mL in sodium chloride 0.9 % 100 mL infusion (15 Units/hr Intravenous New Bag 7/17/25 1659)   ondansetron (ZOFRAN) injection 4 mg (4 mg Intravenous Given 7/17/25 1522)   sodium chloride 0.9 % bolus 1,000 mL (0 mL Intravenous Stopped 7/17/25 1725)   potassium chloride (Klor-Con M20) CR tablet 40 mEq (40 mEq Oral Given 7/17/25 1721)       ED Risk Strat Scores   HEART Risk Score      Flowsheet Row Most Recent Value   Heart Score Risk Calculator    History 1 Filed at: 07/17/2025 1618   ECG 1 Filed at: 07/17/2025 1618   Age 0 Filed at: 07/17/2025 1618   Risk Factors 1 Filed at: 07/17/2025 1618   Troponin 0 Filed at: 07/17/2025 1618   HEART Score 3 Filed at: 07/17/2025 1618          HEART Risk Score      Flowsheet Row Most Recent Value   Heart Score Risk Calculator    History 1 Filed at: 07/17/2025 1618   ECG 1 Filed at: 07/17/2025 1618   Age 0 Filed at: 07/17/2025 1618   Risk Factors 1 Filed at: 07/17/2025 1618   Troponin 0 Filed at: 07/17/2025 1618   HEART Score 3 Filed at: 07/17/2025 1618                      No data recorded    PERC Rule for PE      Flowsheet Row Most Recent Value   PERC Rule for PE    Age >=50 0 Filed at: 07/17/2025 1619   HR >=100 0 Filed at: 07/17/2025 1619   O2 Sat on room air < 95% 0 Filed at: 07/17/2025 1619   History of PE or DVT 0 Filed at: 07/17/2025 1619   Recent trauma or surgery 0 Filed at: 07/17/2025 1619   Hemoptysis 0 Filed at: 07/17/2025 1619   Exogenous estrogen 0 Filed at: 07/17/2025 1619   Unilateral leg swelling 0 Filed at: 07/17/2025 1619   PERC Rule for PE Results 0 Filed at: 07/17/2025 1619                Wells' Criteria for PE      Flowsheet Row Most Recent Value   Wells' Criteria for PE    Clinical signs and symptoms of DVT 0 Filed at: 07/17/2025  1619   PE is primary diagnosis or equally likely 0 Filed at: 07/17/2025 1619   HR >100 0 Filed at: 07/17/2025 1619   Immobilization at least 3 days or Surgery in the previous 4 weeks 0 Filed at: 07/17/2025 1619   Previous, objectively diagnosed PE or DVT 0 Filed at: 07/17/2025 1619   Hemoptysis 0 Filed at: 07/17/2025 1619   Malignancy with treatment within 6 months or palliative 0 Filed at: 07/17/2025 1619   Wells' Criteria Total 0 Filed at: 07/17/2025 1619                        History of Present Illness       Chief Complaint   Patient presents with    Hyperglycemia - Symptomatic     Pt with elevated glucose over 500 the past few days, insulin doesn't seem to be correcting even with pump site change, +nausea, CP, urinary frequency, brain fog/dizziness       Past Medical History[1]   Past Surgical History[2]   Family History[3]   Social History[4]   E-Cigarette/Vaping    E-Cigarette Use Never User       E-Cigarette/Vaping Substances    Nicotine No     THC No     CBD No     Flavoring No     Other No     Unknown No       I have reviewed and agree with the history as documented.     Patient is a 30-year-old female, with a history significant for type 1 diabetes with prior episodes of DKA per my review of the medical record, who presents to the ED today for evaluation of a multiple day history of hyperglycemia with glucoses in the 500-600 range.  Patient reports associated polyuria, fatigue, nausea, generalized cramping abdominal pain, dyspnea, sharp nonradiating/exertional/pleuritic chest pain.  She states that all physical symptoms have occurred previously with elevated sugars.  Patient denies trauma.  Patient attempted giving insulin subcu boluses as well as changing location of her insulin pump but symptoms have persisted.  No clear exacerbating factors.  Patient denies fever, dysuria, rash, weakness, numbness.  Per my review the medical record, patient's discussions with endocrinology notable for concern for pump  malfunction.  Patient is without other concerns at this time.        Review of Systems   Constitutional:  Negative for fever.   HENT:  Negative for trouble swallowing.    Eyes:  Positive for visual disturbance (Since prior concussion, not new).   Respiratory:  Positive for shortness of breath.    Cardiovascular:  Positive for chest pain.   Gastrointestinal:  Positive for abdominal pain and nausea.   Endocrine: Positive for polyuria.   Genitourinary:  Negative for dysuria.   Musculoskeletal:  Negative for gait problem.   Skin:  Negative for rash.   Allergic/Immunologic: Positive for environmental allergies.   Neurological:  Negative for weakness and numbness.   Hematological:  Negative for adenopathy.   Psychiatric/Behavioral:  Negative for confusion.    All other systems reviewed and are negative.          Objective       ED Triage Vitals [07/17/25 1359]   Temperature Pulse Blood Pressure Respirations SpO2 Patient Position - Orthostatic VS   98.5 °F (36.9 °C) 95 121/80 18 99 % Sitting      Temp Source Heart Rate Source BP Location FiO2 (%) Pain Score    Oral Monitor Right arm -- --      Vitals      Date and Time Temp Pulse SpO2 Resp BP Pain Score FACES Pain Rating User   07/17/25 1726 -- 81 99 % 18 121/85 -- -- MC   07/17/25 1359 98.5 °F (36.9 °C) 95 99 % 18 121/80 -- -- HR            Physical Exam  Vitals and nursing note reviewed.   Constitutional:       General: She is not in acute distress.     Appearance: She is not toxic-appearing.   HENT:      Head: Normocephalic and atraumatic.      Right Ear: External ear normal.      Left Ear: External ear normal.      Nose: Nose normal. No rhinorrhea.      Mouth/Throat:      Mouth: Mucous membranes are moist.      Pharynx: Oropharynx is clear. No oropharyngeal exudate or posterior oropharyngeal erythema.      Comments: Uvula midline. No oropharyngeal or submandibular mass/swelling    Eyes:      General: No scleral icterus.        Right eye: No discharge.         Left eye:  No discharge.      Conjunctiva/sclera: Conjunctivae normal.      Pupils: Pupils are equal, round, and reactive to light.     Neck:      Comments: Patient is spontaneously rotating their neck to the left and right during the history and physical exam interaction without difficulty or apparent discomfort    Cardiovascular:      Rate and Rhythm: Normal rate and regular rhythm.      Pulses: Normal pulses.      Heart sounds: Normal heart sounds. No murmur heard.     No friction rub. No gallop.      Comments: 2+ Radial  Pulmonary:      Effort: Pulmonary effort is normal. No respiratory distress.      Breath sounds: Normal breath sounds. No stridor. No wheezing, rhonchi or rales.   Abdominal:      General: Abdomen is flat. There is no distension.      Palpations: Abdomen is soft.      Tenderness: There is abdominal tenderness (Diffuse). There is no right CVA tenderness, left CVA tenderness, guarding or rebound.     Musculoskeletal:         General: No tenderness (No pain with calf squeeze) or deformity.      Cervical back: Neck supple. No tenderness. No muscular tenderness.      Right lower leg: No edema.      Left lower leg: No edema.   Lymphadenopathy:      Cervical: No cervical adenopathy.     Skin:     General: Skin is warm and dry.      Capillary Refill: Capillary refill takes less than 2 seconds.     Neurological:      Mental Status: She is alert.      Comments: Patient is speaking clearly in complete sentences.  Patient is answering appropriately and able follow commands.  Patient is moving all four extremities spontaneously.  No facial droop.  Tongue midline.      Psychiatric:         Mood and Affect: Mood normal.         Behavior: Behavior normal.         Results Reviewed       Procedure Component Value Units Date/Time    Fingerstick Glucose (POCT) [[id number]]  (Abnormal) Collected: 07/17/25 1754    Lab Status: Final result Specimen: Blood Updated: 07/17/25 1754     POC Glucose 322 mg/dl     HS Troponin I 2hr  [[id number]] Collected: 07/17/25 1732    Lab Status: In process Specimen: Blood from Arm, Right Updated: 07/17/25 1736    Fingerstick Glucose (POCT) [[id number]]  (Abnormal) Collected: 07/17/25 1658    Lab Status: Final result Specimen: Blood Updated: 07/17/25 1659     POC Glucose 489 mg/dl     Urine Microscopic [[id number]]  (Normal) Collected: 07/17/25 1445    Lab Status: Final result Specimen: Urine Updated: 07/17/25 1606     RBC, UA 1-2 /hpf      WBC, UA 1-2 /hpf      Epithelial Cells Occasional /hpf      Bacteria, UA Occasional /hpf     HS Troponin I 4hr [[id number]]     Lab Status: No result Specimen: Blood     Protime-INR [[id number]]  (Normal) Collected: 07/17/25 1511    Lab Status: Final result Specimen: Blood from Arm, Left Updated: 07/17/25 1601     Protime 13.2 seconds      INR 0.94    Narrative:      INR Therapeutic Range    Indication                                             INR Range      Atrial Fibrillation                                               2.0-3.0  Hypercoagulable State                                    2.0.2.3  Left Ventricular Asist Device                            2.0-3.0  Mechanical Heart Valve                                  -    Aortic(with afib, MI, embolism, HF, LA enlargement,    and/or coagulopathy)                                     2.0-3.0 (2.5-3.5)     Mitral                                                             2.5-3.5  Prosthetic/Bioprosthetic Heart Valve               2.0-3.0  Venous thromboembolism (VTE: VT, PE        2.0-3.0    APTT [[id number]]  (Normal) Collected: 07/17/25 1511    Lab Status: Final result Specimen: Blood from Arm, Left Updated: 07/17/25 1601     PTT 25 seconds     Procalcitonin [[id number]]  (Normal) Collected: 07/17/25 1511    Lab Status: Final result Specimen: Blood from Arm, Left Updated: 07/17/25 1552     Procalcitonin 0.08 ng/ml     HS Troponin 0hr (reflex protocol) [[id number]]  (Normal) Collected: 07/17/25 1511    Lab Status: Final result  Specimen: Blood from Arm, Left Updated: 07/17/25 1549     hs TnI 0hr <2 ng/L     Comprehensive metabolic panel [[id number]]  (Abnormal) Collected: 07/17/25 1511    Lab Status: Final result Specimen: Blood from Arm, Left Updated: 07/17/25 1549     Sodium 131 mmol/L      Potassium 4.1 mmol/L      Chloride 98 mmol/L      CO2 23 mmol/L      ANION GAP 10 mmol/L      BUN 15 mg/dL      Creatinine 0.82 mg/dL      Glucose 568 mg/dL      Calcium 9.6 mg/dL      AST 13 U/L      ALT 9 U/L      Alkaline Phosphatase 80 U/L      Total Protein 7.4 g/dL      Albumin 4.7 g/dL      Total Bilirubin 0.91 mg/dL      eGFR 97 ml/min/1.73sq m     Narrative:      [employer] guidelines for Chronic Kidney Disease (CKD):     Stage 1 with normal or high GFR (GFR > 90 mL/min/1.73 square meters)    Stage 2 Mild CKD (GFR = 60-89 mL/min/1.73 square meters)    Stage 3A Moderate CKD (GFR = 45-59 mL/min/1.73 square meters)    Stage 3B Moderate CKD (GFR = 30-44 mL/min/1.73 square meters)    Stage 4 Severe CKD (GFR = 15-29 mL/min/1.73 square meters)    Stage 5 End Stage CKD (GFR <15 mL/min/1.73 square meters)  Note: GFR calculation is accurate only with a steady state creatinine    Beta Hydroxybutyrate [[id number]]  (Abnormal) Collected: 07/17/25 1511    Lab Status: Final result Specimen: Blood from Arm, Left Updated: 07/17/25 1541     Beta- Hydroxybutyrate 0.65 mmol/L     Lactic acid [[id number]]  (Normal) Collected: 07/17/25 1511    Lab Status: Final result Specimen: Blood from Arm, Left Updated: 07/17/25 1540     LACTIC ACID 1.5 mmol/L     Narrative:      Result may be elevated if tourniquet was used during collection.    CBC and differential [[id number]]  (Abnormal) Collected: 07/17/25 1511    Lab Status: Final result Specimen: Blood from Arm, Left Updated: 07/17/25 1528     WBC 6.74 Thousand/uL      RBC 4.28 Million/uL      Hemoglobin 11.3 g/dL      Hematocrit 35.2 %      MCV 82 fL      MCH 26.4 pg      MCHC 32.1 g/dL      RDW  12.3 %      MPV 12.4 fL      Platelets 204 Thousands/uL      nRBC 0 /100 WBCs      Segmented % 60 %      Immature Grans % 0 %      Lymphocytes % 30 %      Monocytes % 8 %      Eosinophils Relative 1 %      Basophils Relative 1 %      Absolute Neutrophils 4.08 Thousands/µL      Absolute Immature Grans 0.02 Thousand/uL      Absolute Lymphocytes 2.03 Thousands/µL      Absolute Monocytes 0.51 Thousand/µL      Eosinophils Absolute 0.06 Thousand/µL      Basophils Absolute 0.04 Thousands/µL     Blood gas, venous [[id number]]  (Abnormal) Collected: 07/17/25 1511    Lab Status: Final result Specimen: Blood from Arm, Left Updated: 07/17/25 1525     pH, Dariel 7.436     pCO2, Dariel 31.0 mm Hg      pO2, Dariel 79.2 mm Hg      HCO3, Dariel 20.4 mmol/L      Base Excess, Dariel -2.9 mmol/L      O2 Content, Dariel 16.5 ml/dL      O2 HGB, VENOUS 94.5 %     Blood culture #2 [[id number]] Collected: 07/17/25 1511    Lab Status: In process Specimen: Blood from Arm, Right Updated: 07/17/25 1521    Blood culture #1 [[id number]] Collected: 07/17/25 1511    Lab Status: In process Specimen: Blood from Arm, Left Updated: 07/17/25 1521    POCT pregnancy, urine [[id number]]  (Normal) Collected: 07/17/25 1513    Lab Status: Final result Updated: 07/17/25 1513     EXT Preg Test, Ur Negative     Control Valid    UA w Reflex to Microscopic w Reflex to Culture [[id number]]  (Abnormal) Collected: 07/17/25 1445    Lab Status: Final result Specimen: Urine Updated: 07/17/25 1507     Color, UA Colorless     Clarity, UA Clear     Specific Gravity, UA 1.031     pH, UA 6.5     Leukocytes, UA Elevated glucose may cause decreased leukocyte values. See urine microscopic for UWBC result     Nitrite, UA Negative     Protein, UA Negative mg/dl      Glucose, UA >=1000 (1%) mg/dl      Ketones, UA Negative mg/dl      Urobilinogen, UA <2.0 mg/dl      Bilirubin, UA Negative     Occult Blood, UA Negative    Fingerstick Glucose (POCT) [[id number]]  (Abnormal) Collected: 07/17/25 1401    Lab  "Status: Final result Specimen: Blood Updated: 25 1403     POC Glucose 394 mg/dl             XR chest 1 view portable   ED Interpretation by Renato Hayward MD ( [id number])   Per my independent interpretation: No acute cardiopulmonary process          Procedures    ED Medication and Procedure Management   Prior to Admission Medications   Prescriptions Last Dose Informant Patient Reported? Taking?   Altavera 0.15-30 MG-MCG per tablet Not Taking  No No   Sig: TAKE 1 TABLET BY MOUTH EVERY DAY   Patient not taking: Reported on 2025   BD Insulin Syringe U/F 31G X \" 0.5 ML MISC Past Week Self No Yes   Sig: Use as directly with weekly methotrexate injection.   Belimumab (BENLYSTA IV) Past Month Self Yes Yes   Sig: Inject into a catheter in a vein every month to absorb continually Switching to monthly on    Botox 200 units SOLR More than a month Self Yes No   Cyanocobalamin 1000 MCG SUBL 2025 Self No Yes   Sig: Place 1 tablet (1,000 mcg total) under the tongue daily   EPINEPHrine (EPIPEN) 0.3 mg/0.3 mL SOAJ  Self No No   Sig: Inject 0.3 mL (0.3 mg total) into a muscle once for 1 dose   Glucagon (Gvoke HypoPen 2-Pack) 1 MG/0.2ML SOAJ More than a month Self No No   Si mg PRN for severe hypoglycemia   Glucagon HCl (Glucagon Emergency) 1 MG/ML SOLR More than a month Self Yes No   Insulin Infusion Pump (T:slim X2 Ins Pump/Control-IQ) ANURAG 2025 Self Yes Yes   Sig: Use   Insulin Pen Needle (BD PEN NEEDLE NASIM U/F) 32G X 4 MM MISC  Self No No   Sig: by Does not apply route 4 (four) times a day for 180 days   LORazepam (ATIVAN) 1 mg tablet 2025  No Yes   Sig: Take 1 tablet (1 mg total) by mouth every 8 (eight) hours as needed for anxiety   NovoLOG 100 UNIT/ML injection 2025 Self No Yes   Sig: Up to 100 units per day with insulin pump   Tresiba FlexTouch 100 units/mL injection pen 2025 Self No Yes   Sig: Inject 32 Units under the skin daily   acetaminophen (TYLENOL) 325 mg tablet " 7/16/2025 Self No Yes   Sig: Take 3 tablets (975 mg total) by mouth every 8 (eight) hours as needed for mild pain or headaches   albuterol (ProAir HFA) 90 mcg/act inhaler Not Taking Self No No   Sig: Inhale 2 puffs every 6 (six) hours as needed for wheezing or shortness of breath   Patient not taking: Reported on 7/17/2025   benzonatate (TESSALON) 200 MG capsule Not Taking Self No No   Sig: Take 1 capsule (200 mg total) by mouth 3 (three) times a day as needed for cough   Patient not taking: Reported on 7/17/2025   clindamycin (CLEOCIN T) 1 % lotion Not Taking Self Yes No   Sig: Apply 1 application. topically in the morning and 1 application. in the evening.   Patient not taking: Reported on 7/17/2025   famotidine (PEPCID) 40 MG tablet Not Taking Self No No   Sig: Take 1 tablet (40 mg total) by mouth daily at bedtime   Patient not taking: Reported on 7/17/2025   fluticasone (FLONASE) 50 mcg/act nasal spray Not Taking Self No No   Sig: SPRAY 1 SPRAY INTO EACH NOSTRIL EVERY DAY   Patient not taking: Reported on 7/17/2025   folic acid (FOLVITE) 1 mg tablet 7/16/2025 Self No Yes   Sig: TAKE 1 TABLET BY MOUTH EVERY DAY   gabapentin (Neurontin) 600 MG tablet 7/16/2025 Self No Yes   Sig: Take 1 tablet (600 mg total) by mouth daily   hydroxychloroquine (PLAQUENIL) 200 mg tablet Past Week Self No Yes   Sig: Take 1 tab twice daily on M-F and 1 tab once daily on the weekend.   insulin degludec (Tresiba FlexTouch) 100 units/mL injection pen 7/17/2025 Self No Yes   Sig: INJECT 32 UNITS DAILY   ketorolac (TORADOL) 10 mg tablet More than a month Self No No   Sig: Take 1 tablet (10 mg total) by mouth every 6 (six) hours as needed (migraine) Max 2-3 per week.   levothyroxine 25 mcg tablet 7/16/2025 Self No Yes   Sig: Take 1 tablet (25 mcg total) by mouth daily   metFORMIN (GLUCOPHAGE-XR) 500 mg 24 hr tablet 7/16/2025 Self No Yes   Sig: Take 2 tablets (1,000 mg total) by mouth 2 (two) times a day with meals   methotrexate 50 MG/2ML  "injection Past Week Self No Yes   Sig: INJECT 0.8 ML UNDER THE SKIN ONCE EVERY 7 DAYS. DISCARD UNUSED REMAINDER 30 DAYS AFTER INITIAL USE   ondansetron (ZOFRAN-ODT) 4 mg disintegrating tablet Not Taking Self No No   Sig: Take 1 tablet (4 mg total) by mouth every 6 (six) hours as needed for nausea or vomiting for up to 12 doses   Patient not taking: Reported on 2025   pantoprazole (PROTONIX) 40 mg tablet Not Taking Self No No   Sig: TAKE 1 TABLET BY MOUTH EVERY DAY   Patient not taking: Reported on 2025   promethazine (PHENERGAN) 25 mg tablet   No No   Sig: Take 1 tablet (25 mg total) by mouth every 6 (six) hours as needed for nausea or vomiting for up to 3 days   Patient not taking: Reported on 2025   rizatriptan (MAXALT-MLT) 10 mg disintegrating tablet More than a month Self No No   Si tab at migraine onset, repeat after 2 hours if needed; Max 2 per day and 3 per week.   sucralfate (CARAFATE) 1 g tablet Not Taking Self No No   Sig: Take 1 tablet (1 g total) by mouth 4 (four) times a day   Patient not taking: Reported on 2025      Facility-Administered Medications Last Administration Doses Remaining   Botulinum Toxin Type A SOLR 200 Units None recorded         Current Discharge Medication List        CONTINUE these medications which have NOT CHANGED    Details   acetaminophen (TYLENOL) 325 mg tablet Take 3 tablets (975 mg total) by mouth every 8 (eight) hours as needed for mild pain or headaches    Associated Diagnoses: URI (upper respiratory infection)      BD Insulin Syringe U/F 31G X \" 0.5 ML MISC Use as directly with weekly methotrexate injection.  Qty: 100 each, Refills: 0    Associated Diagnoses: Methotrexate, long term, current use      Belimumab (BENLYSTA IV) Inject into a catheter in a vein every month to absorb continually Switching to monthly on       Cyanocobalamin 1000 MCG SUBL Place 1 tablet (1,000 mcg total) under the tongue daily  Qty: 90 tablet, Refills: 0    " Associated Diagnoses: Vestibular migraine      folic acid (FOLVITE) 1 mg tablet TAKE 1 TABLET BY MOUTH EVERY DAY  Qty: 90 tablet, Refills: 3    Associated Diagnoses: Undifferentiated connective tissue disease (HCC)      gabapentin (Neurontin) 600 MG tablet Take 1 tablet (600 mg total) by mouth daily  Qty: 90 tablet, Refills: 1    Associated Diagnoses: Fibromyalgia      hydroxychloroquine (PLAQUENIL) 200 mg tablet Take 1 tab twice daily on M-F and 1 tab once daily on the weekend.  Qty: 180 tablet, Refills: 3    Associated Diagnoses: Systemic lupus erythematosus, unspecified SLE type, unspecified organ involvement status (HCC)      !! insulin degludec (Tresiba FlexTouch) 100 units/mL injection pen INJECT 32 UNITS DAILY  Qty: 15 mL, Refills: 2    Associated Diagnoses: Uncontrolled type 1 diabetes mellitus with hypoglycemia without coma (HCC); Type 1 diabetes mellitus with hyperglycemia (HCC)      Insulin Infusion Pump (T:slim X2 Ins Pump/Control-IQ) ANURAG Use      levothyroxine 25 mcg tablet Take 1 tablet (25 mcg total) by mouth daily  Qty: 90 tablet, Refills: 3    Associated Diagnoses: Hashimoto's disease      LORazepam (ATIVAN) 1 mg tablet Take 1 tablet (1 mg total) by mouth every 8 (eight) hours as needed for anxiety  Qty: 60 tablet, Refills: 0    Associated Diagnoses: Chronic post-traumatic stress disorder (PTSD); Generalized anxiety disorder with panic attacks      metFORMIN (GLUCOPHAGE-XR) 500 mg 24 hr tablet Take 2 tablets (1,000 mg total) by mouth 2 (two) times a day with meals  Qty: 360 tablet, Refills: 2    Associated Diagnoses: Insulin resistance      methotrexate 50 MG/2ML injection INJECT 0.8 ML UNDER THE SKIN ONCE EVERY 7 DAYS. DISCARD UNUSED REMAINDER 30 DAYS AFTER INITIAL USE  Qty: 10 mL, Refills: 3    Associated Diagnoses: Undifferentiated connective tissue disease (HCC)      NovoLOG 100 UNIT/ML injection Up to 100 units per day with insulin pump  Qty: 90 mL, Refills: 3    Associated Diagnoses:  Uncontrolled type 1 diabetes mellitus with hypoglycemia without coma (HCC)      !! Tresiba FlexTouch 100 units/mL injection pen Inject 32 Units under the skin daily  Qty: 15 mL, Refills: 2    Associated Diagnoses: Type 1 diabetes mellitus with hyperglycemia (HCC)      albuterol (ProAir HFA) 90 mcg/act inhaler Inhale 2 puffs every 6 (six) hours as needed for wheezing or shortness of breath  Qty: 8.5 g, Refills: 0    Comments: Substitution to a formulary equivalent within the same pharmaceutical class is authorized.  Associated Diagnoses: Respiratory infection      Altavera 0.15-30 MG-MCG per tablet TAKE 1 TABLET BY MOUTH EVERY DAY  Qty: 84 tablet, Refills: 1    Associated Diagnoses: Encounter for surveillance of contraceptive pills      benzonatate (TESSALON) 200 MG capsule Take 1 capsule (200 mg total) by mouth 3 (three) times a day as needed for cough  Qty: 20 capsule, Refills: 0    Associated Diagnoses: Respiratory infection      Botox 200 units SOLR       clindamycin (CLEOCIN T) 1 % lotion Apply 1 application. topically in the morning and 1 application. in the evening.      EPINEPHrine (EPIPEN) 0.3 mg/0.3 mL SOAJ Inject 0.3 mL (0.3 mg total) into a muscle once for 1 dose  Qty: 0.6 mL, Refills: 0    Associated Diagnoses: Food allergy      famotidine (PEPCID) 40 MG tablet Take 1 tablet (40 mg total) by mouth daily at bedtime  Qty: 90 tablet, Refills: 3    Associated Diagnoses: Sore throat; Laryngopharyngeal reflux (LPR); Nasal congestion; Subacute cough      fluticasone (FLONASE) 50 mcg/act nasal spray SPRAY 1 SPRAY INTO EACH NOSTRIL EVERY DAY  Qty: 48 mL, Refills: 2    Associated Diagnoses: Subacute sinusitis, unspecified location      Glucagon (Gvoke HypoPen 2-Pack) 1 MG/0.2ML SOAJ 1 mg PRN for severe hypoglycemia  Qty: 0.4 mL, Refills: 0    Associated Diagnoses: Uncontrolled type 1 diabetes mellitus with hypoglycemia without coma (HCC)      Glucagon HCl (Glucagon Emergency) 1 MG/ML SOLR       Insulin Pen Needle  (BD PEN NEEDLE NASIM U/F) 32G X 4 MM MISC by Does not apply route 4 (four) times a day for 180 days  Qty: 400 each, Refills: 3    Associated Diagnoses: Type 1 diabetes mellitus with hyperglycemia (HCC)      ketorolac (TORADOL) 10 mg tablet Take 1 tablet (10 mg total) by mouth every 6 (six) hours as needed (migraine) Max 2-3 per week.  Qty: 10 tablet, Refills: 0    Associated Diagnoses: Chronic migraine without aura without status migrainosus, not intractable      ondansetron (ZOFRAN-ODT) 4 mg disintegrating tablet Take 1 tablet (4 mg total) by mouth every 6 (six) hours as needed for nausea or vomiting for up to 12 doses  Qty: 12 tablet, Refills: 0    Associated Diagnoses: Nausea vomiting and diarrhea      pantoprazole (PROTONIX) 40 mg tablet TAKE 1 TABLET BY MOUTH EVERY DAY  Qty: 90 tablet, Refills: 1    Associated Diagnoses: Pain of upper abdomen; Dyspepsia      promethazine (PHENERGAN) 25 mg tablet Take 1 tablet (25 mg total) by mouth every 6 (six) hours as needed for nausea or vomiting for up to 3 days  Qty: 12 tablet, Refills: 0    Associated Diagnoses: Nausea & vomiting      rizatriptan (MAXALT-MLT) 10 mg disintegrating tablet 1 tab at migraine onset, repeat after 2 hours if needed; Max 2 per day and 3 per week.  Qty: 9 tablet, Refills: 2    Associated Diagnoses: Chronic migraine without aura without status migrainosus, not intractable      sucralfate (CARAFATE) 1 g tablet Take 1 tablet (1 g total) by mouth 4 (four) times a day  Qty: 40 tablet, Refills: 0    Associated Diagnoses: Pain of upper abdomen; Dyspepsia       !! - Potential duplicate medications found. Please discuss with provider.        No discharge procedures on file.  ED SEPSIS DOCUMENTATION   Time reflects when diagnosis was documented in both MDM as applicable and the Disposition within this note       Time User Action Codes Description Comment    7/17/2025  4:19 PM Renato Hayward Add [E10.65] Hyperglycemia due to type 1 diabetes mellitus  ([facility])     7/17/2025  4:19 PM Renato Hayward Add [R07.9] Acute chest pain                    Renato Hayward MD  07/17/25 1759         [1]   Past Medical History:  Diagnosis Date    Abdominal pain     Anaphylaxis     Anxiety     Anxiety and depression     COVID-19 12/2020    Delayed emergence from anesthesia 03/23/2023    Severe episode of emergence delirium (confused, combative) after MAC anesthetic on 03/2023 for EGD. Received versed 2mg, >50mcg precedex, 5mg IV haldol, and 50mcg fentanyl. Waxing and waning episodes of agitation. Any future anesthetics should NOT be done at [facility].    Delirium, induced by drug     anesthesia    Depression     Diabetes mellitus (HCC)     Disease of thyroid gland     Hashimoto    DKA, type 1 ([facility]) 05/11/2021    Eating disorder     history of anorexia/bulemia [id number]    Fatigue     Food intolerance     Fracture of fibula     R Salter I    Gilbert disease     Hashimoto's disease 02/21/2020    Head injury     Headache(784.0)     Migraine     Nasal congestion     PTSD (post-traumatic stress disorder)     Rectal bleed     Seizures (HCC)     Type 1 diabetes ([facility])    [2]   Past Surgical History:  Procedure Laterality Date    COLONOSCOPY      EGD  03/23/2023    KNEE SURGERY Right 07/06/2020    NASAL SEPTOPLASTY W/ TURBINOPLASTY      SINUS SURGERY      SKIN BIOPSY      TURBINOPLASTY N/A 03/22/2021    Procedure: TURBINOPLASTY;  Surgeon: Jn Hidalgo MD;  Location: [facility];  Service: ENT    UPPER GASTROINTESTINAL ENDOSCOPY      WISDOM TOOTH EXTRACTION      WRIST SURGERY      left; Excision of ganglion   [3]   Family History  Problem Relation Name Age of Onset    Hypertension Mother Nancy     Migraines Mother Nancy         Headache    Diabetes type II Mother Nancy     Varicose Veins Mother Nancy     Hyperlipidemia Mother Nancy     Diabetes Mother Nancy     Arthritis Mother Nancy     Depression Mother Nancy     Hearing loss Mother Nancy     Anxiety disorder Mother  Nancy     Eczema Father Michael     Cholelithiasis Father Michael     Hypertension Father Michael     Sarcoidosis Father Michael         Liver    Hyperlipidemia Father Michael     Diabetes Father Michael     Coronary artery disease Father Michael     Nephrolithiasis Father Michael     Cirrhosis Father Michael     Alcohol abuse Father Michael     Thyroid disease Sister      Hashimoto's thyroiditis Sister      Alcohol abuse Brother      Cancer Family      Diabetes Family      Hypertension Family      Thyroid disease Sister Ambika    [4]   Social History  Tobacco Use    Smoking status: Never     Passive exposure: Never    Smokeless tobacco: Never    Tobacco comments:     Tobacco smoke exposure (Father smokes cigars)   Vaping Use    Vaping status: Never Used   Substance Use Topics    Alcohol use: Not Currently     Comment: rarely    Drug use: No        Renato Hayward MD  07/17/25 [id number]

## 2025-07-17 NOTE — LETTER
[facility]ND FLOOR MED SURG UNIT  [address]  [place] [id number]  Dept: [phone]    July 18, 2025     Patient: Jenny Rodrigues   YOB: 1995   Date of Visit: 7/17/2025       To Whom it May Concern:    Jenny Rodrigues is under my professional care. She was seen in the hospital from 7/17/2025 to 07/18/25. She may return to school on 7/21/25 without limitations.    If you have any questions or concerns, please don't hesitate to call.         Sincerely,          Laisha Valdivia MD

## 2025-07-17 NOTE — TELEPHONE ENCOUNTER
Spoke with patient  She did troubleshoot a number of reasons why her blood sugars may be elevated.  We suspect pump malfunction as her pump is out of warranty she is due for a new one.  She will call Sha will like to facilitate this process.  I will also put in orders as necessary.  Advised patient to use basal bolus regimen if blood sugars do not improve to below 350 she should go to ER  We also made setting adjustments to her overnight basal rates as she is generally better controlled during the day and experiencing high blood sugars upon wakening in the mornings (500-600).

## 2025-07-18 VITALS
OXYGEN SATURATION: 93 % | TEMPERATURE: 98.7 F | RESPIRATION RATE: 16 BRPM | DIASTOLIC BLOOD PRESSURE: 79 MMHG | SYSTOLIC BLOOD PRESSURE: 121 MMHG | HEART RATE: 98 BPM

## 2025-07-18 LAB
ALBUMIN SERPL BCG-MCNC: 4.1 G/DL (ref 3.5–5)
ALBUMIN SERPL BCG-MCNC: 4.4 G/DL (ref 3.5–5)
ALP SERPL-CCNC: 55 U/L (ref 34–104)
ALP SERPL-CCNC: 67 U/L (ref 34–104)
ALT SERPL W P-5'-P-CCNC: 9 U/L (ref 7–52)
ALT SERPL W P-5'-P-CCNC: 9 U/L (ref 7–52)
ANION GAP SERPL CALCULATED.3IONS-SCNC: 7 MMOL/L (ref 4–13)
ANION GAP SERPL CALCULATED.3IONS-SCNC: 7 MMOL/L (ref 4–13)
AST SERPL W P-5'-P-CCNC: 13 U/L (ref 13–39)
AST SERPL W P-5'-P-CCNC: 14 U/L (ref 13–39)
ATRIAL RATE: 75 BPM
BILIRUB SERPL-MCNC: 0.65 MG/DL (ref 0.2–1)
BILIRUB SERPL-MCNC: 0.7 MG/DL (ref 0.2–1)
BUN SERPL-MCNC: 12 MG/DL (ref 5–25)
BUN SERPL-MCNC: 14 MG/DL (ref 5–25)
CALCIUM SERPL-MCNC: 8.7 MG/DL (ref 8.4–10.2)
CALCIUM SERPL-MCNC: 9 MG/DL (ref 8.4–10.2)
CHLORIDE SERPL-SCNC: 104 MMOL/L (ref 96–108)
CHLORIDE SERPL-SCNC: 105 MMOL/L (ref 96–108)
CO2 SERPL-SCNC: 23 MMOL/L (ref 21–32)
CO2 SERPL-SCNC: 23 MMOL/L (ref 21–32)
CREAT SERPL-MCNC: 0.72 MG/DL (ref 0.6–1.3)
CREAT SERPL-MCNC: 0.76 MG/DL (ref 0.6–1.3)
GFR SERPL CREATININE-BSD FRML MDRD: 107 ML/MIN/1.73SQ M
GFR SERPL CREATININE-BSD FRML MDRD: 114 ML/MIN/1.73SQ M
GLUCOSE SERPL-MCNC: 103 MG/DL (ref 65–140)
GLUCOSE SERPL-MCNC: 135 MG/DL (ref 65–140)
GLUCOSE SERPL-MCNC: 140 MG/DL (ref 65–140)
GLUCOSE SERPL-MCNC: 194 MG/DL (ref 65–140)
GLUCOSE SERPL-MCNC: 197 MG/DL (ref 65–140)
GLUCOSE SERPL-MCNC: 197 MG/DL (ref 65–140)
GLUCOSE SERPL-MCNC: 209 MG/DL (ref 65–140)
GLUCOSE SERPL-MCNC: 211 MG/DL (ref 65–140)
GLUCOSE SERPL-MCNC: 278 MG/DL (ref 65–140)
GLUCOSE SERPL-MCNC: 340 MG/DL (ref 65–140)
GLUCOSE SERPL-MCNC: 77 MG/DL (ref 65–140)
GLUCOSE SERPL-MCNC: 94 MG/DL (ref 65–140)
MAGNESIUM SERPL-MCNC: 1.9 MG/DL (ref 1.9–2.7)
MAGNESIUM SERPL-MCNC: 1.9 MG/DL (ref 1.9–2.7)
P AXIS: 54 DEGREES
POTASSIUM SERPL-SCNC: 3.9 MMOL/L (ref 3.5–5.3)
POTASSIUM SERPL-SCNC: 3.9 MMOL/L (ref 3.5–5.3)
PR INTERVAL: 152 MS
PROT SERPL-MCNC: 6.5 G/DL (ref 6.4–8.4)
PROT SERPL-MCNC: 6.8 G/DL (ref 6.4–8.4)
QRS AXIS: 38 DEGREES
QRSD INTERVAL: 80 MS
QT INTERVAL: 382 MS
QTC INTERVAL: 426 MS
SODIUM SERPL-SCNC: 134 MMOL/L (ref 135–147)
SODIUM SERPL-SCNC: 135 MMOL/L (ref 135–147)
T WAVE AXIS: 14 DEGREES
VENTRICULAR RATE: 75 BPM

## 2025-07-18 PROCEDURE — 99239 HOSP IP/OBS DSCHRG MGMT >30: CPT | Performed by: STUDENT IN AN ORGANIZED HEALTH CARE EDUCATION/TRAINING PROGRAM

## 2025-07-18 PROCEDURE — 99254 IP/OBS CNSLTJ NEW/EST MOD 60: CPT | Performed by: INTERNAL MEDICINE

## 2025-07-18 PROCEDURE — 93010 ELECTROCARDIOGRAM REPORT: CPT | Performed by: INTERNAL MEDICINE

## 2025-07-18 PROCEDURE — 83735 ASSAY OF MAGNESIUM: CPT

## 2025-07-18 PROCEDURE — 80053 COMPREHEN METABOLIC PANEL: CPT

## 2025-07-18 PROCEDURE — 82948 REAGENT STRIP/BLOOD GLUCOSE: CPT

## 2025-07-18 RX ORDER — INSULIN DEGLUDEC 100 U/ML
25 INJECTION, SOLUTION SUBCUTANEOUS DAILY
Qty: 15 ML | Refills: 0 | Status: SHIPPED | OUTPATIENT
Start: 2025-07-18

## 2025-07-18 RX ORDER — INSULIN LISPRO 100 [IU]/ML
1-5 INJECTION, SOLUTION INTRAVENOUS; SUBCUTANEOUS
Status: DISCONTINUED | OUTPATIENT
Start: 2025-07-19 | End: 2025-07-18 | Stop reason: HOSPADM

## 2025-07-18 RX ORDER — INSULIN ASPART 100 [IU]/ML
INJECTION, SOLUTION INTRAVENOUS; SUBCUTANEOUS
Qty: 90 ML | Refills: 0 | Status: SHIPPED | OUTPATIENT
Start: 2025-07-18

## 2025-07-18 RX ORDER — INSULIN LISPRO 100 [IU]/ML
1-5 INJECTION, SOLUTION INTRAVENOUS; SUBCUTANEOUS
Status: DISCONTINUED | OUTPATIENT
Start: 2025-07-18 | End: 2025-07-18 | Stop reason: HOSPADM

## 2025-07-18 RX ORDER — INSULIN LISPRO 100 [IU]/ML
1-20 INJECTION, SOLUTION INTRAVENOUS; SUBCUTANEOUS
Status: DISCONTINUED | OUTPATIENT
Start: 2025-07-19 | End: 2025-07-18 | Stop reason: HOSPADM

## 2025-07-18 RX ADMIN — Medication 4 G: at 08:25

## 2025-07-18 RX ADMIN — ONDANSETRON 4 MG: 2 INJECTION INTRAMUSCULAR; INTRAVENOUS at 10:03

## 2025-07-18 RX ADMIN — LORAZEPAM 1 MG: 1 TABLET ORAL at 02:27

## 2025-07-18 NOTE — PLAN OF CARE
Problem: PAIN - ADULT  Goal: Verbalizes/displays adequate comfort level or baseline comfort level  Description: Interventions:  - Encourage patient to monitor pain and request assistance  - Assess pain using appropriate pain scale  - Administer analgesics as ordered based on type and severity of pain and evaluate response  - Implement non-pharmacological measures as appropriate and evaluate response  - Consider cultural and social influences on pain and pain management  - Notify physician/advanced practitioner if interventions unsuccessful or patient reports new pain  - Educate patient/family on pain management process including their role and importance of  reporting pain   - Provide non-pharmacologic/complimentary pain relief interventions  Outcome: Progressing     Problem: INFECTION - ADULT  Goal: Absence or prevention of progression during hospitalization  Description: INTERVENTIONS:  - Assess and monitor for signs and symptoms of infection  - Monitor lab/diagnostic results  - Monitor all insertion sites, i.e. indwelling lines, tubes, and drains  - Monitor endotracheal if appropriate and nasal secretions for changes in amount and color  - [place] appropriate cooling/warming therapies per order  - Administer medications as ordered  - Instruct and encourage patient and family to use good hand hygiene technique  - Identify and instruct in appropriate isolation precautions for identified infection/condition  Outcome: Progressing  Goal: Absence of fever/infection during neutropenic period  Description: INTERVENTIONS:  - Monitor WBC  - Perform strict hand hygiene  - Limit to healthy visitors only  - No plants, dried, fresh or silk flowers with candelaria in patient room  Outcome: Progressing     Problem: SAFETY ADULT  Goal: Patient will remain free of falls  Description: INTERVENTIONS:  - Educate patient/family on patient safety including physical limitations  - Instruct patient to call for assistance with activity   -  Consider consulting OT/PT to assist with strengthening/mobility based on AM PAC & JH-HLM score  - Consult OT/PT to assist with strengthening/mobility   - Keep Call bell within reach  - Keep bed low and locked with side rails adjusted as appropriate  - Keep care items and personal belongings within reach  - Initiate and maintain comfort rounds  - Make Fall Risk Sign visible to staff  - Offer Toileting every  Hours, in advance of need  - Initiate/Maintain alarm  - Obtain necessary fall risk management equipment:   - Apply yellow socks and bracelet for high fall risk patients  - Consider moving patient to room near nurses station  Outcome: Progressing  Goal: Maintain or return to baseline ADL function  Description: INTERVENTIONS:  -  Assess patient's ability to carry out ADLs; assess patient's baseline for ADL function and identify physical deficits which impact ability to perform ADLs (bathing, care of mouth/teeth, toileting, grooming, dressing, etc.)  - Assess/evaluate cause of self-care deficits   - Assess range of motion  - Assess patient's mobility; develop plan if impaired  - Assess patient's need for assistive devices and provide as appropriate  - Encourage maximum independence but intervene and supervise when necessary  - Involve family in performance of ADLs  - Assess for home care needs following discharge   - Consider OT consult to assist with ADL evaluation and planning for discharge  - Provide patient education as appropriate  - Monitor functional capacity and physical performance, use of AM PAC & JH-HLM   - Monitor gait, balance and fatigue with ambulation    Outcome: Progressing  Goal: Maintains/Returns to pre admission functional level  Description: INTERVENTIONS:  - Perform AM-PAC 6 Click Basic Mobility/ Daily Activity assessment daily.  - Set and communicate daily mobility goal to care team and patient/family/caregiver.   - Collaborate with rehabilitation services on mobility goals if consulted  -  Perform Range of Motion 3 times a day.  - Reposition patient every 3 hours.  - Dangle patient 3 times a day  - Stand patient 3 times a day  - Ambulate patient 3 times a day  - Out of bed to chair 3 times a day   - Out of bed for meals 3 times a day  - Out of bed for toileting  - Record patient progress and toleration of activity level   Outcome: Progressing     Problem: DISCHARGE PLANNING  Goal: Discharge to home or other facility with appropriate resources  Description: INTERVENTIONS:  - Identify barriers to discharge w/patient and caregiver  - Arrange for needed discharge resources and transportation as appropriate  - Identify discharge learning needs (meds, wound care, etc.)  - Arrange for interpretive services to assist at discharge as needed  - Refer to Case Management Department for coordinating discharge planning if the patient needs post-hospital services based on physician/advanced practitioner order or complex needs related to functional status, cognitive ability, or social support system  Outcome: Progressing     Problem: Knowledge Deficit  Goal: Patient/family/caregiver demonstrates understanding of disease process, treatment plan, medications, and discharge instructions  Description: Complete learning assessment and assess knowledge base.  Interventions:  - Provide teaching at level of understanding  - Provide teaching via preferred learning methods  Outcome: Progressing

## 2025-07-18 NOTE — UTILIZATION REVIEW
Initial Clinical Review    Admission: Date/Time/Statement:   Admission Orders (From admission, onward)       Ordered        07/17/25 1657  INPATIENT ADMISSION  Once                          Orders Placed This Encounter   Procedures    INPATIENT ADMISSION     Standing Status:   Standing     Number of Occurrences:   1     Level of Care:   Level 2 Stepdown / HOT [14]     Estimated length of stay:   More than 2 Midnights     Certification:   I certify that inpatient services are medically necessary for this patient for a duration of greater than two midnights. See H&P and MD Progress Notes for additional information about the patient's course of treatment.     ED Arrival Information       Expected   -    Arrival   7/17/2025 13:56    Acuity   Urgent              Means of arrival   Walk-In    Escorted by   Self    Service   Hospitalist    Admission type   Emergency              Arrival complaint   Freq Urination/Dry Mouth/poss DKA-sent from Endo             Chief Complaint   Patient presents with    Hyperglycemia - Symptomatic     Pt with elevated glucose over 500 the past few days, insulin doesn't seem to be correcting even with pump site change, +nausea, CP, urinary frequency, brain fog/dizziness       Initial Presentation: 30 y.o. female with a PMH of type 1 diabetes, lupus, hashimoto's thyroiditis, neuropathy seizures anxiety, depression and migraines, who presented from home to [facility] ED. Admitted as Inpatient for evaluation and treatment of Type 1 DM w/ Hyperglycemia.      Presented w/  Patient endorses her blood sugars have been between 500 and 600 over the last few days, even upon waking in the morning.  She has been trying to correct the blood sugars with additional units of insulin.  She increased the basal bolus level at night as well.  No corrections have been improving her blood sugar levels. Last time she was hospitalized for DKA was in June 2024. She follows with her endocrinologist every 3  months. She endorses polyuria, polydipsia, fatigue, lightheadedness, weakness and abdominal pain.  She experienced chest pain and shortness of breath prior to coming to the ED which had since improved. On exam, abd tenderness present. Labs Na 131, Anion gap 10, Glucose 568, UA positive for glucose. After patient speaking with endocrinologist this morning, hyperglycemia is likely secondary to pump malfunction. Please see below med list for meds given in the ED.     Plan: Continue insulin gtt. Fingerstick glucose Q 2 hrs, Monitor CBC, BMP, Mg, Na and K levels. Endocrinology consulted.    Anticipated Length of Stay/Certification Statement: Patient will be admitted on an inpatient basis with an anticipated length of stay of greater than 2 midnights secondary to hyperglycemia.     Date: 7/18/25   Day 2:   Labs: Na 135, K3.9, Glucose 209, Mg 1.9.     Endocrinology: Diabetes w/ hyperglycemia. Type 1 diabetes with severe hyperglycemia-most likely secondary to pump failure.  Patient did not meet criteria for diabetic ketoacidosis as her anion gap was normal and bicarb was normal..  Her blood sugars are coming down on insulin infusion..Based on her requirement as well as her pump history, will start basal insulin, Tresiba 25 units at bedtime.  Patient is allergic to Lantus. Recommend to stop insulin infusion 2 hours later. Start Humalog 1 unit for 5 g of carb plus correction scale with algorithm 2.( Her insulin to carb ratio is very strong on pump). Patient wants to go home today, Discussed with patient that we should follow-up blood sugars at least for 12 to 14 hours after transitioning to long-acting insulin in order to make sure blood sugars are okay in  mg/dl and then she can be discharged tomorrow.    ED Treatment-Medication Administration from 07/17/2025 1356 to 07/17/2025 1824         Date/Time Order Dose Route Action     07/17/2025 1522 ondansetron (ZOFRAN) injection 4 mg 4 mg Intravenous Given     07/17/2025  1529 sodium chloride 0.9 % bolus 1,000 mL 1,000 mL Intravenous New Bag     07/17/2025 1659 insulin regular (HumuLIN R,NovoLIN R) 1 Units/mL in sodium chloride 0.9 % 100 mL infusion 15 Units/hr Intravenous New Bag     07/17/2025 1721 potassium chloride (Klor-Con M20) CR tablet 40 mEq 40 mEq Oral Given            Scheduled Medications:  gabapentin, 600 mg, Oral, Daily  [START ON 7/19/2025] hydroxychloroquine, 200 mg, Oral, Once per day on Sunday Saturday  hydroxychloroquine, 400 mg, Oral, Once per day on Monday Tuesday Wednesday Thursday Friday  levothyroxine, 25 mcg, Oral, Daily  glucose chewable tablet 4 g  Dose: 4 g  Freq: Once Route: PO  Start: 07/18/25 0830 End: 07/18/25 0825   insulin lispro (HumALOG/ADMELOG) 100 units/mL subcutaneous injection 1-5 Units  Dose: 1-5 Units  Freq: Daily at bedtime Route: SC  Start: 07/18/25 2200  insulin lispro (HumALOG/ADMELOG) 100 units/mL subcutaneous injection 1-5 Units  Dose: 1-5 Units  Freq: 3 times daily before meals Route: SC  Start: 07/19/25 0700  NON FORMULARY  Dose: 25 Units  Freq: Daily at bedtime Route: SC abd  Start: 07/18/25 2200         Continuous IV Infusions:  insulin regular (HumuLIN R,NovoLIN R) 1 Units/mL in sodium chloride 0.9 % 100 mL infusion  Rate: 0.3-21 mL/hr Dose: 0.3-21 Units/hr  Freq: Titrated Route: IV  Last Dose: 7 Units/hr (07/18/25 1602)  Start: 07/17/25 2300 End: 07/18/25 2359   insulin regular (HumuLIN R,NovoLIN R) 1 Units/mL in sodium chloride 0.9 % 100 mL infusion  Rate: 0.3-21 mL/hr Dose: 0.3-21 Units/hr  Freq: Titrated Route: IV  Last Dose: Stopped (07/17/25 2131)  Start: 07/17/25 1700 End: 07/17/25 2251      PRN Meds:  acetaminophen, 975 mg, Oral, Q8H PRN  LORazepam, 1 mg, Oral, Q8H PRN- given x1 7/18  ondansetron, 4 mg, Intravenous, Q8H PRN- given x1 7/18  polyethylene glycol, 17 g, Oral, Daily PRN      ED Triage Vitals   Temperature Pulse Respirations Blood Pressure SpO2 Pain Score   07/17/25 1359 07/17/25 1359 07/17/25 1359 07/17/25 1359  07/17/25 1359 07/17/25 1831   98.5 °F (36.9 °C) 95 18 121/80 99 % 3     Weight (last 2 days)       None            Vital Signs (last 3 days)       Date/Time Temp Pulse Resp BP MAP (mmHg) SpO2 O2 Device Patient Position - Orthostatic VS [place] Coma Scale Score Pain    07/18/25 08:04:35 98.7 °F (37.1 °C) 101 16 99/70 80 96 % -- -- -- --    07/18/25 0800 -- -- -- -- -- -- -- -- 15 No Pain    07/18/25 0315 97.9 °F (36.6 °C) -- -- 106/78 -- -- -- -- -- --    07/17/25 22:04:05 98.7 °F (37.1 °C) 83 -- 102/71 81 96 % -- -- -- --    07/17/25 2000 -- -- -- -- -- -- -- -- 15 --    07/17/25 1831 -- -- -- -- -- -- -- -- -- 3    07/17/25 18:27:30 98.5 °F (36.9 °C) 85 16 118/89 99 99 % -- -- -- --    07/17/25 1726 -- 81 18 121/85 99 99 % None (Room air) Sitting -- --    07/17/25 1531 -- -- -- -- -- -- -- -- 15 --    07/17/25 1359 98.5 °F (36.9 °C) 95 18 121/80 97 99 % None (Room air) Sitting -- --              Pertinent Labs/Diagnostic Test Results:   Radiology:  XR chest 1 view portable   ED Interpretation by Renato Hayward MD (07/17 0218)   Per my independent interpretation: No acute cardiopulmonary process        Cardiology:  ECG 12 lead    by Interface, Ris Results In (07/17 7030)        Results from last 7 days   Lab Units 07/17/25  1511   WBC Thousand/uL 6.74   HEMOGLOBIN g/dL 11.3*   HEMATOCRIT % 35.2   PLATELETS Thousands/uL 204   TOTAL NEUT ABS Thousands/µL 4.08         Results from last 7 days   Lab Units 07/18/25  0613 07/18/25  0010 07/17/25  1511   SODIUM mmol/L 135 134* 131*   POTASSIUM mmol/L 3.9 3.9 4.1   CHLORIDE mmol/L 105 104 98   CO2 mmol/L 23 23 23   ANION GAP mmol/L 7 7 10   BUN mg/dL 12 14 15   CREATININE mg/dL 0.72 0.76 0.82   EGFR ml/min/1.73sq m 114 107 97   CALCIUM mg/dL 8.7 9.0 9.6   MAGNESIUM mg/dL 1.9 1.9  --      Results from last 7 days   Lab Units 07/18/25  0613 07/18/25  0010 07/17/25  1511   AST U/L 13 14 13   ALT U/L 9 9 9   ALK PHOS U/L 55 67 80   TOTAL PROTEIN g/dL 6.5 6.8 7.4    ALBUMIN g/dL 4.1 4.4 4.7   TOTAL BILIRUBIN mg/dL 0.70 0.65 0.91     Results from last 7 days   Lab Units 07/18/25  0806 07/18/25  0611 07/18/25  0411 07/18/25  0304 07/18/25  0200 07/18/25  0004 07/17/25  2237 07/17/25  2144 07/17/25  2121 07/17/25  1932 07/17/25  1754 07/17/25  1658   POC GLUCOSE mg/dl 94 197* 197* 140 77 194* 253* 125 58* 126 322* 489*     Results from last 7 days   Lab Units 07/18/25  0613 07/18/25  0010 07/17/25  1511   GLUCOSE RANDOM mg/dL 209* 211* 568*             Beta- Hydroxybutyrate   Date Value Ref Range Status   07/17/2025 0.65 (H) 0.20 - 0.60 mmol/L Final   04/06/2025 0.19 0.02 - 0.27 mmol/L Final   01/11/2025 0.66 (H) 0.02 - 0.27 mmol/L Final          Results from last 7 days   Lab Units 07/17/25  1511   PH ISADORA  7.436*   PCO2 ISADORA mm Hg 31.0*   PO2 ISADORA mm Hg 79.2*   HCO3 ISADORA mmol/L 20.4*   BASE EXC ISADORA mmol/L -2.9   O2 CONTENT ISADORA ml/dL 16.5   O2 HGB, VENOUS % 94.5*     Results from last 7 days   Lab Units 07/17/25  1932 07/17/25  1732 07/17/25  1511   HS TNI 0HR ng/L  --   --  <2   HS TNI 2HR ng/L  --  <2  --    HS TNI 4HR ng/L <2  --   --          Results from last 7 days   Lab Units 07/17/25  1511   PROTIME seconds 13.2   INR  0.94   PTT seconds 25         Results from last 7 days   Lab Units 07/17/25  1511   PROCALCITONIN ng/ml 0.08     Results from last 7 days   Lab Units 07/17/25  1511   LACTIC ACID mmol/L 1.5     Results from last 7 days   Lab Units 07/17/25  1445   CLARITY UA  Clear   COLOR UA  Colorless   SPEC GRAV UA  1.031*   PH UA  6.5   GLUCOSE UA mg/dl >=1000 (1%)*   KETONES UA mg/dl Negative   BLOOD UA  Negative   PROTEIN UA mg/dl Negative   NITRITE UA  Negative   BILIRUBIN UA  Negative   UROBILINOGEN UA (BE) mg/dl <2.0   LEUKOCYTES UA  Elevated glucose may cause decreased leukocyte values. See urine microscopic for UWBC result*   WBC UA /hpf 1-2   RBC UA /hpf 1-2   BACTERIA UA /hpf Occasional   EPITHELIAL CELLS WET PREP /hpf Occasional     Results from last 7 days    Lab Units 07/17/25  1511   BLOOD CULTURE  Received in Microbiology Lab. Culture in Progress.  Received in Microbiology Lab. Culture in Progress.       Past Medical History[1]  Present on Admission:   Hashimoto's disease   Neuropathy   Type 1 diabetes mellitus (HCC)   Type 1 diabetes mellitus with hyperglycemia (HCC)   Systemic lupus erythematosus (HCC)   Anxiety      Admitting Diagnosis: Hyperglycemia [R73.9]  Acute chest pain [R07.9]  Hyperglycemia due to type 1 diabetes mellitus (HCC) [E10.65]  Age/Sex: 30 y.o. female    Network Utilization Review Department  ATTENTION: Please call with any questions or concerns to [phone] and carefully listen to the prompts so that you are directed to the right person. All voicemails are confidential.   For Discharge needs, contact Care Management DC Support Team at [phone] opt. 2  Send all requests for admission clinical reviews, approved or denied determinations and any other requests to dedicated fax number below belonging to the campus where the patient is receiving treatment. List of dedicated fax numbers for the Facilities:  FACILITY NAME UR FAX NUMBER   ADMISSION DENIALS (Administrative/Medical Necessity) [phone]   DISCHARGE SUPPORT TEAM (NETWORK) [phone]   PARENT CHILD HEALTH (Maternity/NICU/Pediatrics) [phone]   [facility] [phone]   [facility] [phone]   [facility] [phone]   [facility] [phone]   [facility] [phone]   [facility] [phone]   [facility] [phone]   [facility] [phone]   [facility] [phone]   [facility] [phone]   [facility] [phone]   [facility]  [phone]              [1]   Past Medical History:  Diagnosis Date    Abdominal pain     Anaphylaxis     Anxiety     Anxiety and depression     COVID-19 12/2020    Delayed emergence from anesthesia 03/23/2023    Severe episode of emergence delirium (confused, combative) after MAC anesthetic on 03/2023 for EGD. Received versed 2mg, >50mcg precedex, 5mg IV haldol, and 50mcg fentanyl. Waxing and waning episodes of agitation. Any future anesthetics should NOT be done at [facility].    Delirium, induced by drug     anesthesia    Depression     Diabetes mellitus (HCC)     Disease of thyroid gland     Hashimoto    DKA, type 1 (HCC) 05/11/2021    Eating disorder     history of anorexia/bulemia [id number]    Fatigue     Food intolerance     Fracture of fibula     R Salter I    Gilbert disease     Hashimoto's disease 02/21/2020    Head injury     Headache(784.0)     Migraine     Nasal congestion     PTSD (post-traumatic stress disorder)     Rectal bleed     Seizures (HCC)     Type 1 diabetes (HCC)

## 2025-07-18 NOTE — ASSESSMENT & PLAN NOTE
7/17 (IN ED, 14: 27) - Jenny a 30 y.o. female with a PMH of type 1 diabetes (2019) , multiple episodes of DKA, lupus, hashimoto's thyroiditis, neuropathy, migraines, FRANCIS, OCD  (on ativan )who presents with 2-day history of hyperglycemia.  Patient endorses her blood sugars have been between 500 and 600 over the last 3 days, even upon waking in the morning.  She tried increasing insulin especially basal insulin but no improvement in glucose or suspecting failure of pump is malfunctioning majorly. (Patient typically uses 32 units of long-acting insulin; she estimates she uses between 50 to 90 units/day)  She came to hospital because blood glucose> more than 350.  She had polyuria polydipsia fatigue lightheadedness weakness and abdominal pain with chest pain and shortness of breath prior to coming to the ED.Patient denied  fever, dysuria, rash, weakness, numbness, recent trauma or surgery, history of VTE, hemoptysis, contraceptive use    7/17 IN ED-vital stable, glucose of 568 and sodium 131, troponins normal, UA: Positive for glucose, EKG and chest x-ray normal--- patient in ED started on insulin drip, potassium chloride, sodium chloride bolus, Zofran    7/18-patient was put on insulin drip overnight her day before but had hypoglycemic event at 21: 21 with POC glucose of 58 Mg/dl and was given 2 glucose chewable tablets.  Afterwards till morning her blood glucose was between  mg/dl.  Patient's insulin drip needed continue as an infusion rate.    Plan  7/18-  Continue insulin drip  Endocrinology consulted for management of insulin and pump settings  Continue fingerstick glucose every 2 hours  Continue hypoglycemia protocol   Endocrinology -suggested start basal insulin, Tresiba 25 units at bedtime (patient is allergic to Lantus), stop insulin infusion around 5:30 PM today.  Start Humalog 1 unit for 5 g of carb plus correlation scale with algorithm 2.             Lab Results   Component Value Date    HGBA1C 6.9  (H) 06/09/2025           Recent Labs     07/18/25  0806 07/18/25  0954 07/18/25  1225 07/18/25  1335   POCGLU 94 278* 103 135       Blood Sugar Average: Last 72 hrs:  (P) 198.875

## 2025-07-18 NOTE — CONSULTS
Consultation - Jenny Rodrigues 30 y.o. female MRN: [id number]    Unit/Bed#: S -01 Encounter: [id number]      Assessment & Plan     Assessment:  This is a 30 y.o.-year-old female with diabetes with hyperglycemia.    Plan:  Type 1 diabetes with severe hyperglycemia-most likely secondary to pump failure.  Patient did not meet criteria for diabetic ketoacidosis as her anion gap was normal and bicarb was normal..  Her blood sugars are coming down on insulin infusion..  Based on her requirement as well as her pump history, will start basal insulin, Tresiba 25 units at bedtime.  Patient is allergic to Lantus.   recommend to stop insulin infusion 2 hours later.  Start Humalog 1 unit for 5 g of carb plus correction scale with algorithm 2.( Her insulin to carb ratio is very strong on pump)   Patient wants to go home today, discussed with patient that we should follow-up blood sugars at least for 12 to 14 hours after transitioning to long-acting insulin in order to make sure blood sugars are okay in  mg/dl and then she can be discharged tomorrow.  She would like to discuss with her primary team about she can go today.    Pt is calling tandem to get loner pump, until she gets tandem Mobi .  Also discussed that she will have to use basal bolus insulin regimen until she gets started on insulin pump. Discussed if she gets loner pump tomorrow, she can start it tomorrow 6 pm ,as she will have basal insulin on board .  She will also talk to pump representative to see if she is due for upgrade     CC: Diabetes Consult    History of Present Illness     HPI: Jenny Rodrigues is a 30 y.o. year old female with type 1 diabetes for many  years.  She is on insulin at home.    She uses tandem insulin pump with control IQ.  She uses continuous glucose monitor sensor regularly at home.  Her settings are reviewed by me and settings are as following.  12 AM-basal rate is 0.8 units/h, ISF 40, target 120, insulin to carb ratio is 1:3  3  AM-basal rate is 0.75 units/h,SF 40, target 120, insulin to carb ratio is 1:3  11 AM-0.6 units/h, ISF 40, target 120, insulin to carb ratio is 1:3  2 PM-0.5 units/h,SF 40, target 120, insulin to carb ratio is 1:3    Patient is requiring daily dose of 57 to 60 units/day  Her basal rate, proximately is close to 25 units  Bolus is close to 30 to 35 minutes/day    She stated that even though she was taking boluses through syringe, her blood sugar was not going down for the last 2 days blood sugar has been ranging in 500 to 600 mg per DL range.  Yesterday she was asked to come to the emergency room on arrival her glucose was 568 mg per DL, sodium was 131, anion gap was 10, bicarb was 23, beta hydroxybutyrate was 0.65  She was started on insulin infusion as per non-DKA protocol..  She did have symptoms of polyuria polydipsia fatigue weakness and tiredness.  She did not not have nausea vomiting.  When she is off the pump, she usually uses 30 to 32 units of long-acting insulin.          Inpatient consult to Endocrinology  Consult performed by: Thai Oliveira MD  Consult ordered by: Renato Hayward MD          Review of Systems   Constitutional:  Positive for activity change. Negative for diaphoresis, fatigue, fever and unexpected weight change.   HENT: Negative.     Eyes:  Negative for visual disturbance.   Respiratory:  Negative for cough, chest tightness and shortness of breath.    Cardiovascular:  Negative for chest pain, palpitations and leg swelling.   Gastrointestinal:  Negative for abdominal pain, blood in stool, constipation, diarrhea, nausea and vomiting.   Endocrine: Negative for cold intolerance, heat intolerance, polydipsia, polyphagia and polyuria.   Genitourinary:  Negative for dysuria, enuresis, frequency and urgency.   Musculoskeletal:  Negative for arthralgias and myalgias.   Skin:  Negative for pallor, rash and wound.   Allergic/Immunologic: Negative.    Neurological:  Negative for dizziness,  tremors, weakness and numbness.   Hematological: Negative.    Psychiatric/Behavioral: Negative.         Historical Information   Past Medical History[1]  Past Surgical History[2]  Social History   Social History     Substance and Sexual Activity   Alcohol Use Not Currently    Comment: rarely     Social History     Substance and Sexual Activity   Drug Use No     Tobacco Use History[3]  Family History: Family History[4]    Meds/Allergies   Current Medications[5]  Allergies[6]    Objective   Vitals: Blood pressure 101/63, pulse 92, temperature 98.7 °F (37.1 °C), resp. rate 14, SpO2 97%, not currently breastfeeding.    Intake/Output Summary (Last 24 hours) at 7/18/2025 1526  Last data filed at 7/17/2025 2301  Gross per 24 hour   Intake 1240 ml   Output 600 ml   Net 640 ml     Invasive Devices       Peripheral Intravenous Line  Duration             Peripheral IV 07/17/25 Left Antecubital 1 day    Peripheral IV 07/17/25 Right Antecubital <1 day                    Physical Exam  Vitals reviewed.   Constitutional:       Appearance: Normal appearance.     Cardiovascular:      Rate and Rhythm: Normal rate and regular rhythm.      Pulses: Normal pulses.      Heart sounds: Normal heart sounds.   Pulmonary:      Effort: Pulmonary effort is normal.      Breath sounds: Normal breath sounds.     Musculoskeletal:         General: Normal range of motion.      Cervical back: Normal range of motion and neck supple.      Right lower leg: No edema.      Left lower leg: No edema.     Skin:     General: Skin is warm and dry.     Neurological:      General: No focal deficit present.      Mental Status: She is alert and oriented to person, place, and time.     Psychiatric:         Mood and Affect: Mood normal.         Behavior: Behavior normal.         The history was obtained from the review of the chart, patient.    Lab Results:       Lab Results   Component Value Date    WBC 6.74 07/17/2025    HGB 11.3 (L) 07/17/2025    HCT 35.2  "07/17/2025    MCV 82 07/17/2025     07/17/2025     Lab Results   Component Value Date/Time    BUN 12 07/18/2025 06:13 AM    BUN 9 07/09/2023 02:27 AM    K 3.9 07/18/2025 06:13 AM    K 3.5 07/09/2023 02:27 AM     07/18/2025 06:13 AM     07/09/2023 02:27 AM    CO2 23 07/18/2025 06:13 AM    CO2 22 07/09/2023 02:27 AM    CREATININE 0.72 07/18/2025 06:13 AM    CREATININE 0.63 07/09/2023 02:27 AM    AST 13 07/18/2025 06:13 AM    AST 12 07/07/2023 07:41 PM    ALT 9 07/18/2025 06:13 AM    ALT 12 07/07/2023 07:41 PM    TP 6.5 07/18/2025 06:13 AM    TP 7.5 07/07/2023 07:41 PM    ALB 4.1 07/18/2025 06:13 AM    ALB 4.5 07/07/2023 07:41 PM    GLOB 2.9 02/11/2021 02:55 PM     No results for input(s): \"CHOL\", \"HDL\", \"LDL\", \"TRIG\", \"VLDL\" in the last 72 hours.  No results found for: \"MICROALBUR\", \"LQTP44RXY\"  POC Glucose (mg/dl)   Date Value   07/18/2025 135   07/18/2025 103   07/18/2025 278 (H)   07/18/2025 94   07/18/2025 197 (H)   07/18/2025 197 (H)   07/18/2025 140   07/18/2025 77   07/18/2025 194 (H)   07/17/2025 253 (H)       Imaging Studies: Results Review Statement: No pertinent imaging studies reviewed.    Portions of the record may have been created with voice recognition software.         [1]   Past Medical History:  Diagnosis Date    Abdominal pain     Anaphylaxis     Anxiety     Anxiety and depression     COVID-19 12/2020    Delayed emergence from anesthesia 03/23/2023    Severe episode of emergence delirium (confused, combative) after MAC anesthetic on 03/2023 for EGD. Received versed 2mg, >50mcg precedex, 5mg IV haldol, and 50mcg fentanyl. Waxing and waning episodes of agitation. Any future anesthetics should NOT be done at [facility].    Delirium, induced by drug     anesthesia    Depression     Diabetes mellitus (HCC)     Disease of thyroid gland     Hashimoto    DKA, type 1 (HCC) 05/11/2021    Eating disorder     history of anorexia/bulemia 8425-3611    Fatigue     Food intolerance     Fracture of " fibula     R Salter I    Gilbert disease     Hashimoto's disease 02/21/2020    Head injury     Headache(784.0)     Migraine     Nasal congestion     PTSD (post-traumatic stress disorder)     Rectal bleed     Seizures (HCC)     Type 1 diabetes (HCC)    [2]   Past Surgical History:  Procedure Laterality Date    COLONOSCOPY      EGD  03/23/2023    KNEE SURGERY Right 07/06/2020    NASAL SEPTOPLASTY W/ TURBINOPLASTY      SINUS SURGERY      SKIN BIOPSY      TURBINOPLASTY N/A 03/22/2021    Procedure: TURBINOPLASTY;  Surgeon: Jn Hidalgo MD;  Location: [facility];  Service: ENT    UPPER GASTROINTESTINAL ENDOSCOPY      WISDOM TOOTH EXTRACTION      WRIST SURGERY      left; Excision of ganglion   [3]   Social History  Tobacco Use   Smoking Status Never    Passive exposure: Never   Smokeless Tobacco Never   Tobacco Comments    Tobacco smoke exposure (Father smokes cigars)   [4]   Family History  Problem Relation Name Age of Onset    Hypertension Mother Nancy     Migraines Mother Nancy         Headache    Diabetes type II Mother Nancy     Varicose Veins Mother Nancy     Hyperlipidemia Mother Nancy     Diabetes Mother Nancy     Arthritis Mother Nancy     Depression Mother Nancy     Hearing loss Mother Nancy     Anxiety disorder Mother Nancy     Eczema Father Michael     Cholelithiasis Father Michael     Hypertension Father Michael     Sarcoidosis Father Michael         Liver    Hyperlipidemia Father Michael     Diabetes Father Michael     Coronary artery disease Father Michael     Nephrolithiasis Father Michael     Cirrhosis Father Michael     Alcohol abuse Father Michael     Thyroid disease Sister      Hashimoto's thyroiditis Sister      Alcohol abuse Brother      Cancer Family      Diabetes Family      Hypertension Family      Thyroid disease Sister Ambika    [5]   Current Facility-Administered Medications   Medication Dose Route Frequency Provider Last Rate Last Admin    acetaminophen (TYLENOL) tablet 975 mg   975 mg Oral Q8H PRN Jenny Green DO        gabapentin (NEURONTIN) capsule 600 mg  600 mg Oral Daily Vika Galan MD   600 mg at 07/17/25 2255    [START ON 7/19/2025] hydroxychloroquine (PLAQUENIL) tablet 200 mg  200 mg Oral Once per day on Sunday Saturday Jenny Green DO        hydroxychloroquine (PLAQUENIL) tablet 400 mg  400 mg Oral Once per day on Monday Tuesday Wednesday Thursday Friday Jenny Green DO   400 mg at 07/17/25 2255    insulin regular (HumuLIN R,NovoLIN R) 1 Units/mL in sodium chloride 0.9 % 100 mL infusion  0.3-21 Units/hr Intravenous Titrated Vika Galan MD 0.5 mL/hr at 07/18/25 1342 0.5 Units/hr at 07/18/25 1342    levothyroxine tablet 25 mcg  25 mcg Oral Daily Jenny Green DO        LORazepam (ATIVAN) tablet 1 mg  1 mg Oral Q8H PRN Jenny Green DO   1 mg at 07/18/25 0227    ondansetron (ZOFRAN) injection 4 mg  4 mg Intravenous Q8H PRN Jenny Green DO   4 mg at 07/18/25 1003    polyethylene glycol (MIRALAX) packet 17 g  17 g Oral Daily PRN Jenny Green DO       [6]   Allergies  Allergen Reactions    Shellfish-Derived Products - Food Allergy Anaphylaxis     Octopus, sea snails, claims, etc (ask patient)    Insulin Glargine Other (See Comments)     LANTUS BRAND -Burning and redness under skin

## 2025-07-18 NOTE — PROGRESS NOTES
Progress Note - Hospitalist   Name: Jenny Rodrigues 30 y.o. female I MRN: [id number]  Unit/Bed#: S -01 I Date of Admission: 7/17/2025   Date of Service: 7/18/2025 I Hospital Day: 1    Assessment & Plan  Type 1 diabetes mellitus with hyperglycemia (HCC)  7/17 (IN ED, 14: 27) - Jenny campos 30 y.o. female with a PMH of type 1 diabetes (2019) , multiple episodes of DKA, lupus, hashimoto's thyroiditis, neuropathy, migraines, FRANCIS, OCD  (on ativan )who presents with 2-day history of hyperglycemia.  Patient endorses her blood sugars have been between 500 and 600 over the last 3 days, even upon waking in the morning.  She tried increasing insulin especially basal insulin but no improvement in glucose or suspecting failure of pump is malfunctioning majorly. (Patient typically uses 32 units of long-acting insulin; she estimates she uses between 50 to 90 units/day)  She came to hospital because blood glucose> more than 350.  She had polyuria polydipsia fatigue lightheadedness weakness and abdominal pain with chest pain and shortness of breath prior to coming to the ED.Patient denied  fever, dysuria, rash, weakness, numbness, recent trauma or surgery, history of VTE, hemoptysis, contraceptive use    7/17 IN ED-vital stable, glucose of 568 and sodium 131, troponins normal, UA: Positive for glucose, EKG and chest x-ray normal--- patient in ED started on insulin drip, potassium chloride, sodium chloride bolus, Zofran    7/18-patient was put on insulin drip overnight her day before but had hypoglycemic event at 21: 21 with POC glucose of 58 Mg/dl and was given 2 glucose chewable tablets.  Afterwards till morning her blood glucose was between  mg/dl.  Patient's insulin drip needed continue as an infusion rate.    Plan  7/18-  Continue insulin drip  Endocrinology consulted for management of insulin and pump settings  Continue fingerstick glucose every 2 hours  Continue hypoglycemia protocol   Endocrinology -suggested start  basal insulin, Tresiba 25 units at bedtime (patient is allergic to Lantus), stop insulin infusion around 5:30 PM today.  Start Humalog 1 unit for 5 g of carb plus correlation scale with algorithm 2.             Lab Results   Component Value Date    HGBA1C 6.9 (H) 06/09/2025           Recent Labs     07/18/25  0806 07/18/25  0954 07/18/25  1225 07/18/25  1335   POCGLU 94 278* 103 135       Blood Sugar Average: Last 72 hrs:  (P) 198.875      Hyponatremia   7/18-sodium 135   Status post IV fluid bolus in the ED    Plan  Follow-up with BMP next day  Hashimoto's disease  Continue levothyroxine 25mcg  Neuropathy  Continue gabapentin 600mg  Anxiety  Continue Ativan 1mg as needed  Systemic lupus erythematosus (HCC)  On Belimumab injections monthly, hydroxychloroquine weekly, and methotrexate weekly    VTE Pharmacologic Prophylaxis: VTE Score: 2 Low Risk (Score 0-2) - Encourage Ambulation.    Mobility:   Basic Mobility Inpatient Raw Score: 24  JH-HLM Goal: 8: Walk 250 feet or more  JH-HLM Achieved: 7: Walk 25 feet or more  JH-HLM Goal achieved. Continue to encourage appropriate mobility.    Patient Centered Rounds: I performed bedside rounds with nursing staff today.     Discussions with Specialists or Other Care Team Provider:   Endocrinology -suggested start basal insulin, Tresiba 25 units at bedtime (patient is allergic to Lantus), stop insulin infusion around 5:30 PM today.  Start Humalog 1 unit for 5 g of carb plus correlation scale with algorithm 2.     Education and Discussions with Family / Patient: Attempted to update  (son) via phone. Unable to contact.    Current Length of Stay: 1 day(s)  Current Patient Status: Inpatient   Certification Statement: The patient will continue to require additional inpatient hospital stay due to uncontrolled blood glucose.  Discharge Plan: Anticipate discharge in 24-48 hrs to home.    Code Status: Level 1 - Full Code    Subjective   Patient was seen and examined at  bedside in the morning.  Patient stated having polyuria and abdominal pain.  Patient denied any fever, polyphagia, polydipsia symptoms.    Objective :  Temp:  [97.9 °F (36.6 °C)-98.7 °F (37.1 °C)] 98.7 °F (37.1 °C)  HR:  [] 92  BP: ()/(63-89) 101/63  Resp:  [14-18] 14  SpO2:  [96 %-99 %] 97 %  O2 Device: None (Room air)    There is no height or weight on file to calculate BMI.     Input and Output Summary (last 24 hours):     Intake/Output Summary (Last 24 hours) at 7/18/2025 1546  Last data filed at 7/17/2025 2301  Gross per 24 hour   Intake 1240 ml   Output 600 ml   Net 640 ml       Physical Exam  Constitutional:       Appearance: Normal appearance.   HENT:      Nose: No rhinorrhea.     Cardiovascular:      Rate and Rhythm: Normal rate and regular rhythm.      Pulses: Normal pulses.      Heart sounds: Normal heart sounds.   Pulmonary:      Effort: Pulmonary effort is normal.      Breath sounds: Normal breath sounds.     Neurological:      Mental Status: She is alert and oriented to person, place, and time.     Psychiatric:         Mood and Affect: Mood normal.         Behavior: Behavior normal.           Lines/Drains:              Lab Results: I have reviewed the following results:   Results from last 7 days   Lab Units 07/17/25  1511   WBC Thousand/uL 6.74   HEMOGLOBIN g/dL 11.3*   HEMATOCRIT % 35.2   PLATELETS Thousands/uL 204   SEGS PCT % 60   LYMPHO PCT % 30   MONO PCT % 8   EOS PCT % 1     Results from last 7 days   Lab Units 07/18/25  0613   SODIUM mmol/L 135   POTASSIUM mmol/L 3.9   CHLORIDE mmol/L 105   CO2 mmol/L 23   BUN mg/dL 12   CREATININE mg/dL 0.72   ANION GAP mmol/L 7   CALCIUM mg/dL 8.7   ALBUMIN g/dL 4.1   TOTAL BILIRUBIN mg/dL 0.70   ALK PHOS U/L 55   ALT U/L 9   AST U/L 13   GLUCOSE RANDOM mg/dL 209*     Results from last 7 days   Lab Units 07/17/25  1511   INR  0.94     Results from last 7 days   Lab Units 07/18/25  1335 07/18/25  1225 07/18/25  0954 07/18/25  0806 07/18/25  0611  07/18/25  0411 07/18/25  0304 07/18/25  0200 07/18/25  0004 07/17/25  2237 07/17/25  2144 07/17/25  2121   POC GLUCOSE mg/dl 135 103 278* 94 197* 197* 140 77 194* 253* 125 58*         Results from last 7 days   Lab Units 07/17/25  1511   LACTIC ACID mmol/L 1.5   PROCALCITONIN ng/ml 0.08       Recent Cultures (last 7 days):   Results from last 7 days   Lab Units 07/17/25  1511   BLOOD CULTURE  Received in Microbiology Lab. Culture in Progress.  Received in Microbiology Lab. Culture in Progress.       Imaging Results Review: I reviewed radiology reports from this admission including: chest xray.  Other Study Results Review: No additional pertinent studies reviewed.    Last 24 Hours Medication List:     Current Facility-Administered Medications:     acetaminophen (TYLENOL) tablet 975 mg, Q8H PRN    gabapentin (NEURONTIN) capsule 600 mg, Daily    [START ON 7/19/2025] hydroxychloroquine (PLAQUENIL) tablet 200 mg, Once per day on Sunday Saturday    hydroxychloroquine (PLAQUENIL) tablet 400 mg, Once per day on Monday Tuesday Wednesday Thursday Friday    [START ON 7/19/2025] insulin lispro (HumALOG/ADMELOG) 100 units/mL subcutaneous injection 1-20 Units, Daily With Breakfast    [START ON 7/19/2025] insulin lispro (HumALOG/ADMELOG) 100 units/mL subcutaneous injection 1-5 Units, TID AC **AND** Fingerstick Glucose (POCT), TID AC    insulin lispro (HumALOG/ADMELOG) 100 units/mL subcutaneous injection 1-5 Units, HS    insulin regular (HumuLIN R,NovoLIN R) 1 Units/mL in sodium chloride 0.9 % 100 mL infusion, Titrated, Last Rate: 0.5 Units/hr (07/18/25 1342)    levothyroxine tablet 25 mcg, Daily    LORazepam (ATIVAN) tablet 1 mg, Q8H PRN    NON FORMULARY, HS    ondansetron (ZOFRAN) injection 4 mg, Q8H PRN    polyethylene glycol (MIRALAX) packet 17 g, Daily PRN    Administrative Statements   Today, Patient Was Seen By: Jacquelyn Cannon MD  I have spent a total time of 20 minutes in caring for this patient on the day of the  visit/encounter including further plan of care.    **Please Note: This note may have been constructed using a voice recognition system.**

## 2025-07-19 PROBLEM — R79.89 PSEUDOHYPONATREMIA: Status: ACTIVE | Noted: 2025-07-17

## 2025-07-19 NOTE — ASSESSMENT & PLAN NOTE
P/w a 2-day history of elevated BG in the 500-600 range (including AM fasting) despite having insulin pump in place  Normal regimen: 32 U tresiba (allergic to Lantus) + humalog 1 unit for every 3 carbs (estimates 50-90 units total daily of short-acting)  Attempted to increase basal and sliding scale w/o improvement   Sx included polyuria, polydipsia, fatigue, lightheadedness, weakness, abd pain, CP, and SOB, all of which she has had when hyperglycemic or in DKA in the past  Denied any recent sick contacts or travel  In ED - VSS,  w pseudohyponatremia of 131, negative lactic acid and procalcitonin, elevated beta-hydroxybutyrate of 0.65, anion gap wnl 10, VBG with respiratory alkalosis (pH 7.436, pCO2 low at 31, and HCO3 low at 20.4)  ECG, CXR both normal; UA without signs of infection, elevated glucose  Suspect due to pump malfunction after d/w endocrinology  S/p 1 L NS bolus, KCl 40 mEq, IV zofran, and started on a non-DKA insulin gtt  O/n, pt's BG was low-normal at 77 and transitioned to 0.3 U/hr as per algorithm 1; still expeditiously dropped to 58 and given 2 x 4 g glucose tabs, improving to 197  Pt was asymptomatic during these episodes  In AM, BG was in low 90s, and given 1 x 4 g glucose tab to prevent a hypoglycemic event similar to overnight; pt also had breakfast in the interim, and BG recheck ~2 hours later of 278  On evaluation, pt endorsed fatigued and ongoing polyuria, but denied abdominal pain, CP, SOB, or polydipsia  Noted that her employer refused to give her time off for this medical emergency, and pt was concerned that she would lose her job  Wanted to leave in the afternoon, and after calling insurance to attempt to expedite delivery of new pump, it would not arrive for 2 weeks  Discussed case with endocrinology, who provided instructions for a temporary insulin regimen    Plan:  Discharge to home with close monitoring for symptoms as above, of blood glucose, and any changes in mentation  F/u  blood sugars frequently  Close follow-up with endocrinologist outpatient  Home insulin recommendations per endocrinology:  Tresiba 25 U at bedtime (allergy to lantus)  Humalog 1 unit per 5 g of carbs plus correction        Lab Results   Component Value Date    HGBA1C 6.9 (H) 06/09/2025           Recent Labs     07/18/25  0954 07/18/25  1225 07/18/25  1335 07/18/25  1557   POCGLU 278* 103 135 340*       Blood Sugar Average: Last 72 hrs:  (P) [id number]

## 2025-07-19 NOTE — DISCHARGE SUMMARY
Discharge Summary - Hospitalist   Name: Jenny Rodrigues 30 y.o. female I MRN: [id number]  Unit/Bed#: S -01 I Date of Admission: 7/17/2025   Date of Service: 7/18/2025 I Hospital Day: 1     Assessment & Plan  Type 1 diabetes mellitus with hyperglycemia (HCC)  P/w a 2-day history of elevated BG in the 500-600 range (including AM fasting) despite having insulin pump in place  Normal regimen: 32 U tresiba (allergic to Lantus) + humalog 1 unit for every 3 carbs (estimates 50-90 units total daily of short-acting)  Attempted to increase basal and sliding scale w/o improvement   Sx included polyuria, polydipsia, fatigue, lightheadedness, weakness, abd pain, CP, and SOB, all of which she has had when hyperglycemic or in DKA in the past  Denied any recent sick contacts or travel  In ED - VSS,  w pseudohyponatremia of 131, negative lactic acid and procalcitonin, elevated beta-hydroxybutyrate of 0.65, anion gap wnl 10, VBG with respiratory alkalosis (pH 7.436, pCO2 low at 31, and HCO3 low at 20.4)  ECG, CXR both normal; UA without signs of infection, elevated glucose  Suspect due to pump malfunction after d/w endocrinology  S/p 1 L NS bolus, KCl 40 mEq, IV zofran, and started on a non-DKA insulin gtt  O/n, pt's BG was low-normal at 77 and transitioned to 0.3 U/hr as per algorithm 1; still expeditiously dropped to 58 and given 2 x 4 g glucose tabs, improving to 197  Pt was asymptomatic during these episodes  In AM, BG was in low 90s, and given 1 x 4 g glucose tab to prevent a hypoglycemic event similar to overnight; pt also had breakfast in the interim, and BG recheck ~2 hours later of 278  On evaluation, pt endorsed fatigued and ongoing polyuria, but denied abdominal pain, CP, SOB, or polydipsia  Noted that her employer refused to give her time off for this medical emergency, and pt was concerned that she would lose her job  Wanted to leave in the afternoon, and after calling insurance to attempt to expedite  delivery of new pump, it would not arrive for 2 weeks  Discussed case with endocrinology, who provided instructions for a temporary insulin regimen    Plan:  Discharge to home with close monitoring for symptoms as above, of blood glucose, and any changes in mentation  F/u blood sugars frequently  Close follow-up with endocrinologist outpatient  Home insulin recommendations per endocrinology:  Tresiba 25 U at bedtime (allergy to lantus)  Humalog 1 unit per 5 g of carbs plus correction        Lab Results   Component Value Date    HGBA1C 6.9 (H) 06/09/2025           Recent Labs     07/18/25  0954 07/18/25  1225 07/18/25  1335 07/18/25  1557   POCGLU 278* 103 135 340*       Blood Sugar Average: Last 72 hrs:  (P) [id number]  Pseudohyponatremia  7/17 initial labs with Na of 131 and BG > 500  Normal after correction of blood glucose  Hashimoto's disease  Continue PTA levothyroxine 25 mcg  Neuropathy  Continue gabapentin 600 mg daily  Anxiety  Continue PTA PO lorazepam 1 mg every 8 hours as needed  Systemic lupus erythematosus (HCC)  On Belimumab injections monthly, hydroxychloroquine weekly, and methotrexate weekly     Medical Problems       Resolved Problems  Date Reviewed: 7/7/2025   None       Discharging Physician / Practitioner: Mona Gandara DO  PCP: Osvaldo Betancur DO  Admission Date:   Admission Orders (From admission, onward)       Ordered        07/17/25 1657  INPATIENT ADMISSION  Once                          Discharge Date: 07/18/25    Next Steps for Physician/AP Assuming Care:  Please monitor blood sugars closely for this patient and send to the hospital based on your clinical judgment and pt's symptoms  If possible, please try to coordinate sooner delivery of insulin pump (possibly by endocrinologist's office?)    Test Results Pending at Discharge (will require follow up):  None    Medication Changes for Discharge & Rationale:   Changes to Tresiba and Humalog regimen as above in an effort to  control BG in the interim between now and when new pump will be delivered  See after visit summary for reconciled discharge medications provided to patient and/or family.     Consultations During Hospital Stay:  Endocrinology    Procedures Performed:   None    Significant Findings / Test Results:   UA with 1% glucose, otherwise no indications of infection  VBG indicative of respiratory alkalosis with pH of 7.436, pCO2 31, HCO3 20.4  Beta-hydroxybutyrate elevated at 0.65  7/17 BMP with pseudohyponatremia of 131 in the context of significantly elevated BG of 568, and negative anion gap of 10  7/17-18 overnight asymptomatic hypoglycemic episode of 77 -> 58 despite adjusting insulin gtt per protocol, requiring 2 x glucose tabs 4 g  7/18 dropping BG in low 90s, given 1 x glucose tab 4 g to prevent rapid decrease of blood sugar    Hospital Course:   Jenny Rodrigues is a 30 y.o. female with a PMHx of T1DM with insulin pump in place, Hashimoto's thyroiditis, lupus, and migraines who originally presented to the hospital on 7/17/2025 due to persistent hyperglycemia into the 500s-600s x 2 days with symptoms of SOB, CP, polydipsia, polyuria, and abdominal pain. These symptoms were consistent with previous hyperglycemic episodes, some of which were precursors to DKA. She notes 50-90 units of short-acting insulin daily, and despite trying to self-administer more insulin based on her readings, consistently high sugars. She suspects her insulin pump is malfunctioning, and is currently in the process of obtaining a new device, though this could take up to 3 weeks. In the ED, vital signs were stable. Imaging and labs were negative for any indications of acute infection. However, labs showed significant hyperglycemia of 568 with pseudohyponatremia of 131, beta-hydroxybutyrate of 0.65, negative anion gap of 10, and VBG with respiratory alkalosis of 7.436, pCO2 of 31, and HCO3 of 20.4. She was given IV zofran, a 1 L NS bolus, and  starting on a non-DKA insulin drip.     Overnight, her blood glucose was low-normal at 77 and 58 and given 2 x glucose tabs 4 g with improvement to 197. Similar episode occurred in AM with BG of 94, and pt given 1 x glucose tab 4 g due to concerns for expeditious drop; also ate breakfast soon after, and repeat BG 2 hours later was 278. On discharge day evaluation, most of her symptoms had resolved except for fatigue and polyuria. She also explained that she was worried that she was at risk for losing her job since her remote job declined to give her time off for this health emergency. Due to this, she wanted to be discharged, and attempted to facilitate an earlier delivery of her new insulin pump; she was unfortunately told that it would not arrive for another 2 weeks. With this information, we discussed her case with endocrinology, and she was discharged with a basal and sliding scale regimen with instructions to closely monitor symptoms and blood sugars at home to evaluate for the need for ED re-evaluation.      The patient, initially admitted to the hospital as inpatient, was discharged earlier than expected given the following: inability to get time off of work for medical leave. Discussed temporary home regimen until pump arrives and symptoms that would indicate urgent ED evaluation.  Please see above list of diagnoses and related plan for additional information.     Discharge Day Visit / Exam:   * Please refer to separate progress note for these details *     Discussion with Family: Updated  (significant other) at bedside.    Discharge instructions/Information to patient and family:   See after visit summary for information provided to patient and family.      Provisions for Follow-Up Care:  See after visit summary for information related to follow-up care and any pertinent home health orders.      Mobility at time of Discharge:   Basic Mobility Inpatient Raw Score: 24  -[facility] Goal: 8: Walk 250  feet or more  JH-HLM Achieved: 8: Walk 250 feet ot more  HLM Goal achieved. Continue to encourage appropriate mobility.     Disposition:   Home    Planned Readmission: No    Administrative Statements   Discharge Statement:  I have spent a total time of 30 minutes in caring for this patient on the day of the visit/encounter. .    **Please Note: This note may have been constructed using a voice recognition system**

## 2025-07-20 LAB
BACTERIA BLD CULT: NORMAL
BACTERIA BLD CULT: NORMAL

## 2025-07-21 ENCOUNTER — TRANSITIONAL CARE MANAGEMENT (OUTPATIENT)
Dept: FAMILY MEDICINE CLINIC | Facility: CLINIC | Age: 30
End: 2025-07-21

## 2025-07-21 ENCOUNTER — TELEPHONE (OUTPATIENT)
Dept: ENDOCRINOLOGY | Facility: CLINIC | Age: 30
End: 2025-07-21

## 2025-07-21 NOTE — UTILIZATION REVIEW
NOTIFICATION OF ADMISSION DISCHARGE   This is a Notification of Discharge from [facility]. Please be advised that this patient has been discharge from our facility. Below you will find the admission and discharge date and time including the patient’s disposition.   UTILIZATION REVIEW CONTACT:  Utilization Review Assistants  Network Utilization Review Department  Phone: [phone] x carefully listen to the prompts. All voicemails are confidential.  Email: [email or web address]     ADMISSION INFORMATION  PRESENTATION DATE: 7/17/2025  2:27 PM  OBERVATION ADMISSION DATE: N/A  INPATIENT ADMISSION DATE: 7/17/25  4:58 PM   DISCHARGE DATE: 7/18/2025  6:00 PM   DISPOSITION:Left against medical advice or discontinued care    Network Utilization Review Department  ATTENTION: Please call with any questions or concerns to [phone] and carefully listen to the prompts so that you are directed to the right person. All voicemails are confidential.   For Discharge needs, contact Care Management DC Support Team at [phone] opt. 2  Send all requests for admission clinical reviews, approved or denied determinations and any other requests to dedicated fax number below belonging to the campus where the patient is receiving treatment. List of dedicated fax numbers for the Facilities:  FACILITY NAME UR FAX NUMBER   ADMISSION DENIALS (Administrative/Medical Necessity) [phone]   DISCHARGE SUPPORT TEAM ([facility]) [phone]   PARENT CHILD HEALTH (Maternity/NICU/Pediatrics) [phone]   [facility] [phone]   [facility] [phone]   [facility] [phone]   [facility] [phone]   [facility] [phone]   [facility] [phone]   [facility] [phone]   [facility]  [place] [phone]   [facility] [phone]   [facility] [phone]   [facility] [phone]   [facility] [phone]

## 2025-07-22 LAB
BACTERIA BLD CULT: NORMAL
BACTERIA BLD CULT: NORMAL

## 2025-07-23 ENCOUNTER — OFFICE VISIT (OUTPATIENT)
Dept: FAMILY MEDICINE CLINIC | Facility: CLINIC | Age: 30
End: 2025-07-23
Payer: COMMERCIAL

## 2025-07-23 VITALS
RESPIRATION RATE: 16 BRPM | HEIGHT: 61 IN | DIASTOLIC BLOOD PRESSURE: 70 MMHG | HEART RATE: 86 BPM | TEMPERATURE: 97.7 F | OXYGEN SATURATION: 96 % | BODY MASS INDEX: 27.45 KG/M2 | SYSTOLIC BLOOD PRESSURE: 110 MMHG | WEIGHT: 145.4 LBS

## 2025-07-23 DIAGNOSIS — R42 EPISODIC LIGHTHEADEDNESS: ICD-10-CM

## 2025-07-23 DIAGNOSIS — R11.2 NAUSEA & VOMITING: ICD-10-CM

## 2025-07-23 DIAGNOSIS — Z91.018 FOOD ALLERGY: ICD-10-CM

## 2025-07-23 DIAGNOSIS — E06.3 HYPOTHYROIDISM DUE TO HASHIMOTO THYROIDITIS: ICD-10-CM

## 2025-07-23 DIAGNOSIS — E10.65 TYPE 1 DIABETES MELLITUS WITH HYPERGLYCEMIA (HCC): Primary | ICD-10-CM

## 2025-07-23 PROCEDURE — 99495 TRANSJ CARE MGMT MOD F2F 14D: CPT | Performed by: FAMILY MEDICINE

## 2025-07-23 RX ORDER — PROMETHAZINE HYDROCHLORIDE 25 MG/1
25 TABLET ORAL EVERY 6 HOURS PRN
Qty: 12 TABLET | Refills: 0 | Status: SHIPPED | OUTPATIENT
Start: 2025-07-23 | End: 2025-07-26

## 2025-07-23 RX ORDER — EPINEPHRINE 0.3 MG/.3ML
0.3 INJECTION SUBCUTANEOUS ONCE
Qty: 0.6 ML | Refills: 0 | Status: SHIPPED | OUTPATIENT
Start: 2025-07-23 | End: 2025-07-23

## 2025-07-23 NOTE — UTILIZATION REVIEW
Initial Clinical Review    Admission: Date/Time/Statement:   Admission Orders (From admission, onward)       Ordered        07/17/25 1657  INPATIENT ADMISSION  Once                          Orders Placed This Encounter   Procedures    INPATIENT ADMISSION     Standing Status:   Standing     Number of Occurrences:   1     Level of Care:   Level 2 Stepdown / HOT [14]     Estimated length of stay:   More than 2 Midnights     Certification:   I certify that inpatient services are medically necessary for this patient for a duration of greater than two midnights. See H&P and MD Progress Notes for additional information about the patient's course of treatment.     ED Arrival Information       Expected   -    Arrival   7/17/2025 13:56    Acuity   Urgent              Means of arrival   Walk-In    Escorted by   Self    Service   Hospitalist    Admission type   Emergency              Arrival complaint   Freq Urination/Dry Mouth/poss DKA-sent from Endo             Chief Complaint   Patient presents with    Hyperglycemia - Symptomatic     Pt with elevated glucose over 500 the past few days, insulin doesn't seem to be correcting even with pump site change, +nausea, CP, urinary frequency, brain fog/dizziness       Initial Presentation: 30 y.o. female with a PMH of type 1 diabetes, lupus, hashimoto's thyroiditis, neuropathy seizures anxiety, depression and migraines, who presented from home to [facility] ED. Admitted as Inpatient for evaluation and treatment of Type 1 DM w/ Hyperglycemia.      Presented w/  Patient endorses her blood sugars have been between 500 and 600 over the last few days, even upon waking in the morning.  She has been trying to correct the blood sugars with additional units of insulin.  She increased the basal bolus level at night as well.  No corrections have been improving her blood sugar levels. Last time she was hospitalized for DKA was in June 2024. She follows with her endocrinologist every 3  months. She endorses polyuria, polydipsia, fatigue, lightheadedness, weakness and abdominal pain.  She experienced chest pain and shortness of breath prior to coming to the ED which had since improved. On exam, abd tenderness present. Labs Na 131, Anion gap 10, Glucose 568, UA positive for glucose. After patient speaking with endocrinologist this morning, hyperglycemia is likely secondary to pump malfunction. Please see below med list for meds given in the ED.     Plan: Continue insulin gtt. Fingerstick glucose Q 2 hrs, Monitor CBC, BMP, Mg, Na and K levels. Endocrinology consulted.    Anticipated Length of Stay/Certification Statement: Patient will be admitted on an inpatient basis with an anticipated length of stay of greater than 2 midnights secondary to hyperglycemia.     Date: 7/18/25   Day 2:   Labs: Na 135, K3.9, Glucose 209, Mg 1.9.     Endocrinology: Diabetes w/ hyperglycemia. Type 1 diabetes with severe hyperglycemia-most likely secondary to pump failure.  Patient did not meet criteria for diabetic ketoacidosis as her anion gap was normal and bicarb was normal..  Her blood sugars are coming down on insulin infusion..Based on her requirement as well as her pump history, will start basal insulin, Tresiba 25 units at bedtime.  Patient is allergic to Lantus. Recommend to stop insulin infusion 2 hours later. Start Humalog 1 unit for 5 g of carb plus correction scale with algorithm 2.( Her insulin to carb ratio is very strong on pump). Patient wants to go home today, Discussed with patient that we should follow-up blood sugars at least for 12 to 14 hours after transitioning to long-acting insulin in order to make sure blood sugars are okay in  mg/dl and then she can be discharged tomorrow.    ED Treatment-Medication Administration from 07/17/2025 1356 to 07/17/2025 1824         Date/Time Order Dose Route Action     07/17/2025 1522 ondansetron (ZOFRAN) injection 4 mg 4 mg Intravenous Given     07/17/2025  1529 sodium chloride 0.9 % bolus 1,000 mL 1,000 mL Intravenous New Bag     07/17/2025 1659 insulin regular (HumuLIN R,NovoLIN R) 1 Units/mL in sodium chloride 0.9 % 100 mL infusion 15 Units/hr Intravenous New Bag     07/17/2025 1721 potassium chloride (Klor-Con M20) CR tablet 40 mEq 40 mEq Oral Given            Scheduled Medications:  gabapentin, 600 mg, Oral, Daily  [START ON 7/19/2025] hydroxychloroquine, 200 mg, Oral, Once per day on Sunday Saturday  hydroxychloroquine, 400 mg, Oral, Once per day on Monday Tuesday Wednesday Thursday Friday  levothyroxine, 25 mcg, Oral, Daily  glucose chewable tablet 4 g  Dose: 4 g  Freq: Once Route: PO  Start: 07/18/25 0830 End: 07/18/25 0825   insulin lispro (HumALOG/ADMELOG) 100 units/mL subcutaneous injection 1-5 Units  Dose: 1-5 Units  Freq: Daily at bedtime Route: SC  Start: 07/18/25 2200  insulin lispro (HumALOG/ADMELOG) 100 units/mL subcutaneous injection 1-5 Units  Dose: 1-5 Units  Freq: 3 times daily before meals Route: SC  Start: 07/19/25 0700  NON FORMULARY  Dose: 25 Units  Freq: Daily at bedtime Route: SC abd  Start: 07/18/25 2200         Continuous IV Infusions:  insulin regular (HumuLIN R,NovoLIN R) 1 Units/mL in sodium chloride 0.9 % 100 mL infusion  Rate: 0.3-21 mL/hr Dose: 0.3-21 Units/hr  Freq: Titrated Route: IV  Last Dose: 7 Units/hr (07/18/25 1602)  Start: 07/17/25 2300 End: 07/18/25 2359   insulin regular (HumuLIN R,NovoLIN R) 1 Units/mL in sodium chloride 0.9 % 100 mL infusion  Rate: 0.3-21 mL/hr Dose: 0.3-21 Units/hr  Freq: Titrated Route: IV  Last Dose: Stopped (07/17/25 2131)  Start: 07/17/25 1700 End: 07/17/25 2251      PRN Meds:  acetaminophen, 975 mg, Oral, Q8H PRN  LORazepam, 1 mg, Oral, Q8H PRN- given x1 7/18  ondansetron, 4 mg, Intravenous, Q8H PRN- given x1 7/18  polyethylene glycol, 17 g, Oral, Daily PRN      ED Triage Vitals   Temperature Pulse Respirations Blood Pressure SpO2 Pain Score   07/17/25 1359 07/17/25 1359 07/17/25 1359 07/17/25 1359  07/17/25 1359 07/17/25 1831   98.5 °F (36.9 °C) 95 18 121/80 99 % 3     Weight (last 2 days) before discharge       None            Vital Signs (last 3 days) before discharge       Date/Time Temp Pulse Resp BP MAP (mmHg) SpO2 O2 Device Patient Position - Orthostatic VS [place] Coma Scale Score Pain    07/18/25 1600 -- -- -- -- -- -- -- -- 15 --    07/18/25 15:53:40 -- 98 16 121/79 93 93 % -- -- -- --    07/18/25 1200 -- -- -- -- -- -- -- -- 15 --    07/18/25 11:18:20 98.7 °F (37.1 °C) 92 14 101/63 76 97 % -- -- -- --    07/18/25 08:04:35 98.7 °F (37.1 °C) 101 16 99/70 80 96 % -- -- -- --    07/18/25 0800 -- -- -- -- -- -- -- -- 15 No Pain    07/18/25 0315 97.9 °F (36.6 °C) -- -- 106/78 -- -- -- -- -- --    07/17/25 22:04:05 98.7 °F (37.1 °C) 83 -- 102/71 81 96 % -- -- -- --    07/17/25 2000 -- -- -- -- -- -- -- -- 15 --    07/17/25 1831 -- -- -- -- -- -- -- -- -- 3    07/17/25 18:27:30 98.5 °F (36.9 °C) 85 16 118/89 99 99 % -- -- -- --    07/17/25 1726 -- 81 18 121/85 99 99 % None (Room air) Sitting -- --    07/17/25 1531 -- -- -- -- -- -- -- -- 15 --    07/17/25 1359 98.5 °F (36.9 °C) 95 18 121/80 97 99 % None (Room air) Sitting -- --              Pertinent Labs/Diagnostic Test Results:   Radiology:  XR chest 1 view portable   ED Interpretation by Renato Hayward MD (07/17 [id number])   Per my independent interpretation: No acute cardiopulmonary process      Final Interpretation by Henrik Mariee MD (07/18 [id number])      No acute cardiopulmonary disease.            Workstation performed: [id number]           Cardiology:  ECG 12 lead   Final Result by Edgard Reyes MD (07/18 [id number])   Normal sinus rhythm   Normal ECG   When compared with ECG of 06-Apr-2025 11:17,   No significant change was found   Confirmed by Edgard Reyes ([id number]) on 7/18/2025 11:38:36 PM        Results from last 7 days   Lab Units 07/17/25  1511   WBC Thousand/uL 6.74   HEMOGLOBIN g/dL 11.3*   HEMATOCRIT % 35.2   PLATELETS Thousands/uL 204   TOTAL NEUT  ABS Thousands/µL 4.08         Results from last 7 days   Lab Units 07/18/25  0613 07/18/25  0010 07/17/25  1511   SODIUM mmol/L 135 134* 131*   POTASSIUM mmol/L 3.9 3.9 4.1   CHLORIDE mmol/L 105 104 98   CO2 mmol/L 23 23 23   ANION GAP mmol/L 7 7 10   BUN mg/dL 12 14 15   CREATININE mg/dL 0.72 0.76 0.82   EGFR ml/min/1.73sq m 114 107 97   CALCIUM mg/dL 8.7 9.0 9.6   MAGNESIUM mg/dL 1.9 1.9  --      Results from last 7 days   Lab Units 07/18/25  0613 07/18/25  0010 07/17/25  1511   AST U/L 13 14 13   ALT U/L 9 9 9   ALK PHOS U/L 55 67 80   TOTAL PROTEIN g/dL 6.5 6.8 7.4   ALBUMIN g/dL 4.1 4.4 4.7   TOTAL BILIRUBIN mg/dL 0.70 0.65 0.91     Results from last 7 days   Lab Units 07/18/25  1557 07/18/25  1335 07/18/25  1225 07/18/25  0954 07/18/25  0806 07/18/25  0611 07/18/25  0411 07/18/25  0304 07/18/25  0200 07/18/25  0004 07/17/25  2237 07/17/25  2144   POC GLUCOSE mg/dl 340* 135 103 278* 94 197* 197* 140 77 194* 253* 125     Results from last 7 days   Lab Units 07/18/25  0613 07/18/25  0010 07/17/25  1511   GLUCOSE RANDOM mg/dL 209* 211* 568*             Beta- Hydroxybutyrate   Date Value Ref Range Status   07/17/2025 0.65 (H) 0.20 - 0.60 mmol/L Final   04/06/2025 0.19 0.02 - 0.27 mmol/L Final   01/11/2025 0.66 (H) 0.02 - 0.27 mmol/L Final          Results from last 7 days   Lab Units 07/17/25  1511   PH ISADORA  7.436*   PCO2 ISADORA mm Hg 31.0*   PO2 ISADORA mm Hg 79.2*   HCO3 ISADORA mmol/L 20.4*   BASE EXC ISADORA mmol/L -2.9   O2 CONTENT ISADORA ml/dL 16.5   O2 HGB, VENOUS % 94.5*     Results from last 7 days   Lab Units 07/17/25  1932 07/17/25  1732 07/17/25  1511   HS TNI 0HR ng/L  --   --  <2   HS TNI 2HR ng/L  --  <2  --    HS TNI 4HR ng/L <2  --   --          Results from last 7 days   Lab Units 07/17/25  1511   PROTIME seconds 13.2   INR  0.94   PTT seconds 25         Results from last 7 days   Lab Units 07/17/25  1511   PROCALCITONIN ng/ml 0.08     Results from last 7 days   Lab Units 07/17/25  1511   LACTIC ACID mmol/L 1.5      Results from last 7 days   Lab Units 07/17/25  1445   CLARITY UA  Clear   COLOR UA  Colorless   SPEC GRAV UA  1.031*   PH UA  6.5   GLUCOSE UA mg/dl >=1000 (1%)*   KETONES UA mg/dl Negative   BLOOD UA  Negative   PROTEIN UA mg/dl Negative   NITRITE UA  Negative   BILIRUBIN UA  Negative   UROBILINOGEN UA (BE) mg/dl <2.0   LEUKOCYTES UA  Elevated glucose may cause decreased leukocyte values. See urine microscopic for UWBC result*   WBC UA /hpf 1-2   RBC UA /hpf 1-2   BACTERIA UA /hpf Occasional   EPITHELIAL CELLS WET PREP /hpf Occasional     Results from last 7 days   Lab Units 07/17/25  1511   BLOOD CULTURE  No Growth After 5 Days.  No Growth After 5 Days.       Past Medical History[1]  Present on Admission:   Hashimoto's disease   Neuropathy   Type 1 diabetes mellitus (HCC)   Type 1 diabetes mellitus with hyperglycemia (HCC)   Systemic lupus erythematosus (HCC)   Anxiety      Admitting Diagnosis: Hyperglycemia [R73.9]  Acute chest pain [R07.9]  Hyperglycemia due to type 1 diabetes mellitus (HCC) [E10.65]  Age/Sex: 30 y.o. female    Network Utilization Review Department  ATTENTION: Please call with any questions or concerns to [phone] and carefully listen to the prompts so that you are directed to the right person. All voicemails are confidential.   For Discharge needs, contact Care Management DC Support Team at [phone] opt. 2  Send all requests for admission clinical reviews, approved or denied determinations and any other requests to dedicated fax number below belonging to the campus where the patient is receiving treatment. List of dedicated fax numbers for the Facilities:  FACILITY NAME UR FAX NUMBER   ADMISSION DENIALS (Administrative/Medical Necessity) [phone]   DISCHARGE SUPPORT TEAM (NETWORK) [phone]   PARENT CHILD HEALTH (Maternity/NICU/Pediatrics) [phone]   [facility] [phone]   [facility] [phone]   [facility]  [facility] [phone]   [facility] [phone]   [facility] [phone]   [facility] [phone]   [facility] [phone]   [facility] [phone]   [facility] [phone]   [facility] [phone]   [facility] [phone]   [facility] [phone]                [1]   Past Medical History:  Diagnosis Date    Abdominal pain     Anaphylaxis     Anxiety     Anxiety and depression     COVID-19 12/2020    Delayed emergence from anesthesia 03/23/2023    Severe episode of emergence delirium (confused, combative) after MAC anesthetic on 03/2023 for EGD. Received versed 2mg, >50mcg precedex, 5mg IV haldol, and 50mcg fentanyl. Waxing and waning episodes of agitation. Any future anesthetics should NOT be done at [facility].    Delirium, induced by drug     anesthesia    Depression     Diabetes mellitus (HCC)     Disease of thyroid gland     Hashimoto    DKA, type 1 (HCC) 05/11/2021    Eating disorder     history of anorexia/bulemia [id number]    Fatigue     Food intolerance     Fracture of fibula     R Salter I    Gilbert disease     Hashimoto's disease 02/21/2020    Head injury     Headache(784.0)     Migraine     Nasal congestion     PTSD (post-traumatic stress disorder)     Rectal bleed     Seizures (HCC)     Type 1 diabetes (HCC)

## 2025-07-23 NOTE — UTILIZATION REVIEW
NOTIFICATION OF INPATIENT ADMISSION   AUTHORIZATION REQUEST   SERVICING FACILITY:   [place] [address]  Tax ID: [id number]  NPI: [id number]   ATTENDING PROVIDER:  Attending Name and NPI#: Laisha Valdivia Md [[id number]]  Address: [address] [place] [address]  Phone: [phone]     ADMISSION INFORMATION:  Place of Service: Inpatient [facility]  Place of Service Code: 21  Inpatient Admission Date/Time: 7/17/25  4:58 PM  Discharge Date/Time: 7/18/2025  6:00 PM  Admitting Diagnosis Code/Description:  Hyperglycemia [R73.9]  Acute chest pain [R07.9]  Hyperglycemia due to type 1 diabetes mellitus (HCC) [E10.65]     UTILIZATION REVIEW CONTACT:  Shira Ruby Utilization   Network Utilization Review Department  Phone: [phone]  Fax: [phone]  Email: [email or web address]  Contact for approvals/pending authorizations, clinical reviews, and discharge.     PHYSICIAN ADVISORY SERVICES:  Medical Necessity Denial & Mzkq-da-Jzdy Review  Phone: [phone]  Fax: [phone]  Email: [email or web address]     DISCHARGE SUPPORT TEAM:  For Patients Discharge Needs & Updates  Phone: [phone] opt. 2 Fax: [phone]  Email: [email or web address]

## 2025-07-25 ENCOUNTER — TELEPHONE (OUTPATIENT)
Dept: ENDOCRINOLOGY | Facility: CLINIC | Age: 30
End: 2025-07-25

## 2025-07-25 ENCOUNTER — VBI (OUTPATIENT)
Dept: ADMINISTRATIVE | Facility: OTHER | Age: 30
End: 2025-07-25

## 2025-07-25 NOTE — TELEPHONE ENCOUNTER
Received message from our Medical director on patient concerns. Contacted patient to discuss her concerns. She will continue to follow up at our [place] location with Dr. Hilton.

## 2025-07-25 NOTE — TELEPHONE ENCOUNTER
07/25/25 10:03 AM     Chart reviewed for Diabetic Eye Exam was/were submitted to the patient's insurance.     Doreen Sanchez MA   [facility]

## 2025-07-30 DIAGNOSIS — E10.649 UNCONTROLLED TYPE 1 DIABETES MELLITUS WITH HYPOGLYCEMIA WITHOUT COMA (HCC): ICD-10-CM

## 2025-07-31 RX ORDER — GLUCAGON INJECTION, SOLUTION 1 MG/.2ML
INJECTION, SOLUTION SUBCUTANEOUS
Qty: 0.4 ML | Refills: 2 | Status: SHIPPED | OUTPATIENT
Start: 2025-07-31

## 2025-08-08 ENCOUNTER — HOSPITAL ENCOUNTER (OUTPATIENT)
Dept: INFUSION CENTER | Facility: CLINIC | Age: 30
End: 2025-08-08
Attending: INTERNAL MEDICINE
Payer: COMMERCIAL

## 2025-08-11 ENCOUNTER — TELEPHONE (OUTPATIENT)
Age: 30
End: 2025-08-11

## 2025-08-11 ENCOUNTER — TELEPHONE (OUTPATIENT)
Dept: RHEUMATOLOGY | Facility: CLINIC | Age: 30
End: 2025-08-11

## 2025-08-15 ENCOUNTER — TELEPHONE (OUTPATIENT)
Age: 30
End: 2025-08-15

## 2025-08-17 DIAGNOSIS — M79.7 FIBROMYALGIA: ICD-10-CM

## 2025-08-18 RX ORDER — GABAPENTIN 600 MG/1
600 TABLET ORAL DAILY
Qty: 90 TABLET | Refills: 1 | Status: SHIPPED | OUTPATIENT
Start: 2025-08-18

## 2025-08-19 ENCOUNTER — TELEPHONE (OUTPATIENT)
Dept: NEUROLOGY | Facility: CLINIC | Age: 30
End: 2025-08-19

## (undated) DEVICE — GLOVE SRG BIOGEL 7.5

## (undated) DEVICE — ANTI-FOG SOLUTION WITH FOAM PAD: Brand: DEVON

## (undated) DEVICE — BLADE 1882040 5PK INFERIOR TURB 2MM

## (undated) DEVICE — STERILE NASAL PACK: Brand: CARDINAL HEALTH

## (undated) DEVICE — PAD GROUNDING ADULT

## (undated) DEVICE — 3000CC GUARDIAN II: Brand: GUARDIAN

## (undated) DEVICE — SUT PROLENE 3-0 V-7 36 IN 8976H

## (undated) DEVICE — Device

## (undated) DEVICE — ELECTRODE NEEDLE MEGAFINE 2IN E-Z CLEAN MEGADYNE -0118

## (undated) DEVICE — NEEDLE 25G X 1 1/2

## (undated) DEVICE — NEURO PATTIES 1/2 X 3